# Patient Record
Sex: FEMALE | Race: WHITE | NOT HISPANIC OR LATINO | Employment: OTHER | URBAN - METROPOLITAN AREA
[De-identification: names, ages, dates, MRNs, and addresses within clinical notes are randomized per-mention and may not be internally consistent; named-entity substitution may affect disease eponyms.]

---

## 2017-01-03 ENCOUNTER — TELEPHONE (OUTPATIENT)
Dept: NEPHROLOGY | Age: 62
End: 2017-01-03

## 2017-01-04 ENCOUNTER — E-ADVICE (OUTPATIENT)
Dept: NEPHROLOGY | Age: 62
End: 2017-01-04

## 2017-01-06 ENCOUNTER — OFFICE VISIT (OUTPATIENT)
Dept: CARDIOLOGY | Age: 62
End: 2017-01-06
Attending: INTERNAL MEDICINE

## 2017-01-06 ENCOUNTER — OFFICE VISIT (OUTPATIENT)
Dept: INTERNAL MEDICINE | Age: 62
End: 2017-01-06

## 2017-01-06 ENCOUNTER — TELEPHONE (OUTPATIENT)
Dept: INTERNAL MEDICINE | Age: 62
End: 2017-01-06

## 2017-01-06 VITALS
HEART RATE: 88 BPM | DIASTOLIC BLOOD PRESSURE: 70 MMHG | BODY MASS INDEX: 46.26 KG/M2 | WEIGHT: 278 LBS | SYSTOLIC BLOOD PRESSURE: 120 MMHG

## 2017-01-06 DIAGNOSIS — R00.2 HEART PALPITATIONS: ICD-10-CM

## 2017-01-06 DIAGNOSIS — R00.2 HEART PALPITATIONS: Primary | ICD-10-CM

## 2017-01-06 DIAGNOSIS — G47.39 MIXED SLEEP APNEA: ICD-10-CM

## 2017-01-06 PROCEDURE — 99213 OFFICE O/P EST LOW 20 MIN: CPT | Performed by: INTERNAL MEDICINE

## 2017-01-06 PROCEDURE — 93225 XTRNL ECG REC<48 HRS REC: CPT | Performed by: INTERNAL MEDICINE

## 2017-01-06 PROCEDURE — 93227 XTRNL ECG REC<48 HR R&I: CPT | Performed by: INTERNAL MEDICINE

## 2017-01-06 RX ORDER — ATORVASTATIN CALCIUM 10 MG/1
10 TABLET, FILM COATED ORAL DAILY
Qty: 30 TABLET | Refills: 3 | Status: SHIPPED | OUTPATIENT
Start: 2017-01-06 | End: 2017-01-26 | Stop reason: SDUPTHER

## 2017-01-10 ENCOUNTER — TELEPHONE (OUTPATIENT)
Dept: INTERNAL MEDICINE | Age: 62
End: 2017-01-10

## 2017-01-10 RX ORDER — POLYETHYLENE GLYCOL 3350 17 G/17G
POWDER, FOR SOLUTION ORAL
Qty: 3162 G | Refills: 1 | Status: SHIPPED | OUTPATIENT
Start: 2017-01-10 | End: 2017-03-23 | Stop reason: SDUPTHER

## 2017-01-13 ENCOUNTER — E-ADVICE (OUTPATIENT)
Dept: INTERNAL MEDICINE | Age: 62
End: 2017-01-13

## 2017-01-13 RX ORDER — OMEPRAZOLE 40 MG/1
40 CAPSULE, DELAYED RELEASE ORAL 2 TIMES DAILY
Qty: 180 CAPSULE | Refills: 0 | Status: SHIPPED | OUTPATIENT
Start: 2017-01-13 | End: 2017-03-08 | Stop reason: SDUPTHER

## 2017-01-19 ENCOUNTER — E-ADVICE (OUTPATIENT)
Dept: INTERNAL MEDICINE | Age: 62
End: 2017-01-19

## 2017-01-19 DIAGNOSIS — Z98.84 STATUS POST GASTRIC BYPASS FOR OBESITY: Primary | ICD-10-CM

## 2017-01-19 DIAGNOSIS — N18.30 CKD (CHRONIC KIDNEY DISEASE), STAGE III (CMD): ICD-10-CM

## 2017-01-20 ENCOUNTER — TELEPHONE (OUTPATIENT)
Dept: SLEEP MEDICINE | Age: 62
End: 2017-01-20

## 2017-01-20 DIAGNOSIS — R40.0 DAYTIME SOMNOLENCE: Primary | ICD-10-CM

## 2017-01-26 ENCOUNTER — E-ADVICE (OUTPATIENT)
Dept: INTERNAL MEDICINE | Age: 62
End: 2017-01-26

## 2017-01-26 ENCOUNTER — TELEPHONE (OUTPATIENT)
Dept: INTERNAL MEDICINE | Age: 62
End: 2017-01-26

## 2017-01-26 RX ORDER — ATORVASTATIN CALCIUM 10 MG/1
10 TABLET, FILM COATED ORAL DAILY
Qty: 90 TABLET | Refills: 0 | Status: SHIPPED | OUTPATIENT
Start: 2017-01-26 | End: 2017-03-23 | Stop reason: SDUPTHER

## 2017-01-26 RX ORDER — CLONAZEPAM 2 MG/1
2 TABLET ORAL NIGHTLY PRN
Qty: 90 TABLET | Refills: 1 | Status: SHIPPED
Start: 2017-01-26 | End: 2017-04-10 | Stop reason: SDUPTHER

## 2017-01-31 ENCOUNTER — OFFICE VISIT (OUTPATIENT)
Dept: EDUCATION SERVICES | Age: 62
End: 2017-01-31
Attending: INTERNAL MEDICINE

## 2017-01-31 VITALS — HEIGHT: 65 IN | WEIGHT: 281.4 LBS | BODY MASS INDEX: 46.88 KG/M2

## 2017-01-31 DIAGNOSIS — N18.30 CKD (CHRONIC KIDNEY DISEASE), STAGE III (CMD): ICD-10-CM

## 2017-01-31 DIAGNOSIS — Z98.84 S/P GASTRIC BYPASS: Primary | ICD-10-CM

## 2017-01-31 PROCEDURE — 97802 MEDICAL NUTRITION INDIV IN: CPT | Performed by: DIETITIAN, REGISTERED

## 2017-01-31 RX ORDER — ONDANSETRON 4 MG/1
4 TABLET, ORALLY DISINTEGRATING ORAL EVERY 8 HOURS PRN
Qty: 20 TABLET | Refills: 1 | Status: SHIPPED | OUTPATIENT
Start: 2017-01-31 | End: 2017-03-21 | Stop reason: ALTCHOICE

## 2017-02-01 ENCOUNTER — HOSPITAL ENCOUNTER (OUTPATIENT)
Dept: SLEEP MEDICINE | Age: 62
Discharge: STILL ADMITTED OR ADMINISTRATIVE | End: 2017-02-01
Attending: INTERNAL MEDICINE

## 2017-02-01 DIAGNOSIS — R40.0 DAYTIME SOMNOLENCE: ICD-10-CM

## 2017-02-01 PROCEDURE — G0399 HOME SLEEP TEST/TYPE 3 PORTA: HCPCS

## 2017-02-02 ENCOUNTER — PREP FOR CASE (OUTPATIENT)
Dept: SURGERY | Age: 62
End: 2017-02-02

## 2017-02-02 ENCOUNTER — HOSPITAL ENCOUNTER (OUTPATIENT)
Age: 62
End: 2017-02-02
Attending: SURGERY | Admitting: SURGERY

## 2017-02-02 DIAGNOSIS — Z86.010 PERSONAL HISTORY OF COLONIC POLYPS: Primary | ICD-10-CM

## 2017-02-02 RX ORDER — POLYETHYLENE GLYCOL 3350, SODIUM CHLORIDE, SODIUM BICARBONATE, POTASSIUM CHLORIDE 420; 11.2; 5.72; 1.48 G/4L; G/4L; G/4L; G/4L
POWDER, FOR SOLUTION ORAL
Qty: 4000 ML | Refills: 0 | Status: SHIPPED | OUTPATIENT
Start: 2017-02-02 | End: 2017-09-20 | Stop reason: ALTCHOICE

## 2017-02-02 ASSESSMENT — SLEEP AND FATIGUE QUESTIONNAIRES
HOW MUCH CAFFEINE DID YOU HAVE: 20 OZ
DO YOU HAVE ANY PHYSICAL COMPLAINTS RIGHT NOW: NO
DID YOU TAKE A NAP TODAY: NO
WHEN DID YOU FEEL SLEEPY: BETWEEN 8 AND 9
DO YOU HAVE A SOUR/BITTER TASTE IN YOUR MOUTH: YES
HOW LONG DO YOU THINK YOU WERE ASLEEP (HRS/MIN): 9 HOURS
NUMBER OF TIMES YOU WOKE UP IN THE NIGHT: 2
HOW MUCH SLEEP DID YOU HAVE LAST NIGHT (HRS/MIN): 9 HOURS
DID YOU HAVE DIFFICULTY FALLING BACK TO SLEEP: YES
HAVE YOU RECENTLY TAKEN ANY MEDICATIONS THAT MAKE YOU FEEL SLEEPY: YES
DID YOU WAKE UP DURING THE NIGHT: YES
IS YOUR NOSE OR MOUTH DRY: YES
HOW DOES YOUR SLEEP LAST NIGHT COMPARE TO YOUR USUAL SLEEP AT HOME: SAME AS
DID YOU FEEL SLEEPY TODAY: YES
HAS TODAY BEEN AN UNUSUAL DAY IN ANY RESPECT: NO
HOW SLEEPY DO YOU FEEL RIGHT NOW: QUITE A BIT
HOW DOES THIS COMPARE TO YOUR USUAL AMOUNT OF SLEEP AT HOME: SAME AS
DID YOU HAVE ANY CAFFEINE TODAY: YES
WHAT TIME DID YOU WAKE UP TODAY: 23400
HOW LONG DID IT TAKE TO FALL ASLEEP LAST NIGHT (HRS/MIN): .5
DID YOU HAVE DIFFICULTY FALLING ASLEEP LAST NIGHT: YES

## 2017-02-03 ENCOUNTER — E-ADVICE (OUTPATIENT)
Dept: INTERNAL MEDICINE | Age: 62
End: 2017-02-03

## 2017-02-10 ENCOUNTER — E-ADVICE (OUTPATIENT)
Dept: NEPHROLOGY | Age: 62
End: 2017-02-10

## 2017-02-10 ENCOUNTER — PREP FOR CASE (OUTPATIENT)
Dept: SURGERY | Age: 62
End: 2017-02-10

## 2017-02-10 DIAGNOSIS — Z86.010 PERSONAL HISTORY OF COLONIC POLYPS: Primary | ICD-10-CM

## 2017-02-14 ENCOUNTER — TELEPHONE (OUTPATIENT)
Dept: INTERNAL MEDICINE | Age: 62
End: 2017-02-14

## 2017-02-24 ENCOUNTER — PREP FOR CASE (OUTPATIENT)
Dept: SURGERY | Age: 62
End: 2017-02-24

## 2017-02-24 ENCOUNTER — TELEPHONE (OUTPATIENT)
Dept: SURGERY | Age: 62
End: 2017-02-24

## 2017-02-24 DIAGNOSIS — Z86.010 HISTORY OF COLON POLYPS: Primary | ICD-10-CM

## 2017-03-08 RX ORDER — OMEPRAZOLE 40 MG/1
CAPSULE, DELAYED RELEASE ORAL
Qty: 180 CAPSULE | Refills: 0 | Status: SHIPPED | OUTPATIENT
Start: 2017-03-08 | End: 2017-03-22 | Stop reason: SDUPTHER

## 2017-03-10 RX ORDER — SODIUM CHLORIDE, SODIUM LACTATE, POTASSIUM CHLORIDE, CALCIUM CHLORIDE 600; 310; 30; 20 MG/100ML; MG/100ML; MG/100ML; MG/100ML
INJECTION, SOLUTION INTRAVENOUS CONTINUOUS
Status: CANCELLED | OUTPATIENT
Start: 2017-03-10

## 2017-03-10 RX ORDER — 0.9 % SODIUM CHLORIDE 0.9 %
2 VIAL (ML) INJECTION PRN
Status: CANCELLED | OUTPATIENT
Start: 2017-03-10

## 2017-03-10 RX ORDER — 0.9 % SODIUM CHLORIDE 0.9 %
2 VIAL (ML) INJECTION EVERY 12 HOURS SCHEDULED
Status: CANCELLED | OUTPATIENT
Start: 2017-03-10

## 2017-03-13 ENCOUNTER — TELEPHONE (OUTPATIENT)
Dept: INTERNAL MEDICINE | Age: 62
End: 2017-03-13

## 2017-03-13 RX ORDER — CLARITHROMYCIN 500 MG/1
500 TABLET, COATED ORAL 2 TIMES DAILY
Qty: 20 TABLET | Refills: 0 | Status: SHIPPED | OUTPATIENT
Start: 2017-03-13 | End: 2017-03-21 | Stop reason: ALTCHOICE

## 2017-03-21 PROCEDURE — 10004348 HB COUNTER-EVAL PRE-OP

## 2017-03-21 RX ORDER — LISINOPRIL 5 MG/1
5 TABLET ORAL DAILY
COMMUNITY
End: 2017-03-23 | Stop reason: SDUPTHER

## 2017-03-21 ASSESSMENT — ACTIVITIES OF DAILY LIVING (ADL)
HISTORY OF FALLING IN THE LAST YEAR (PRIOR TO ADMISSION): NO
CHRONIC_PAIN_PRESENT: NO
RECENT_DECLINE_ADL: NO
ADL_SCORE: 12
NEEDS_ASSIST: NO
ADL_SHORT_OF_BREATH: NO
SENSORY_SUPPORT_DEVICES: EYEGLASSES
ADL_BEFORE_ADMISSION: INDEPENDENT

## 2017-03-21 ASSESSMENT — COGNITIVE AND FUNCTIONAL STATUS - GENERAL
ARE YOU BLIND OR DO YOU HAVE SERIOUS DIFFICULTY SEEING, EVEN WHEN WEARING GLASSES: NO
ARE YOU DEAF OR DO YOU HAVE SERIOUS DIFFICULTY  HEARING: NO

## 2017-03-22 ENCOUNTER — OFFICE VISIT (OUTPATIENT)
Dept: INTERNAL MEDICINE | Age: 62
End: 2017-03-22

## 2017-03-22 VITALS — SYSTOLIC BLOOD PRESSURE: 144 MMHG | HEART RATE: 102 BPM | DIASTOLIC BLOOD PRESSURE: 90 MMHG | TEMPERATURE: 97.7 F

## 2017-03-22 DIAGNOSIS — J45.909 INTRINSIC ASTHMA, UNSPECIFIED ASTHMA SEVERITY, UNCOMPLICATED: ICD-10-CM

## 2017-03-22 DIAGNOSIS — J98.01 BRONCHOSPASM, ACUTE: Primary | ICD-10-CM

## 2017-03-22 PROCEDURE — 99213 OFFICE O/P EST LOW 20 MIN: CPT | Performed by: INTERNAL MEDICINE

## 2017-03-22 RX ORDER — CLARITHROMYCIN 500 MG/1
500 TABLET, COATED ORAL 2 TIMES DAILY
Qty: 20 TABLET | Refills: 0 | Status: SHIPPED | OUTPATIENT
Start: 2017-03-22 | End: 2017-04-01

## 2017-03-22 RX ORDER — AZITHROMYCIN 500 MG/1
500 TABLET, FILM COATED ORAL PRN
Qty: 10 TABLET | Refills: 0 | Status: ON HOLD | OUTPATIENT
Start: 2017-03-22 | End: 2018-02-01 | Stop reason: HOSPADM

## 2017-03-22 RX ORDER — PREDNISONE 20 MG/1
TABLET ORAL
Qty: 15 TABLET | Refills: 0 | Status: SHIPPED | OUTPATIENT
Start: 2017-03-22 | End: 2017-03-31 | Stop reason: SDUPTHER

## 2017-03-23 RX ORDER — POLYETHYLENE GLYCOL 3350 17 G/17G
POWDER, FOR SOLUTION ORAL
Qty: 3162 G | Refills: 3 | Status: SHIPPED | OUTPATIENT
Start: 2017-03-23 | End: 2018-04-29 | Stop reason: SDUPTHER

## 2017-03-23 RX ORDER — PRAZOSIN HYDROCHLORIDE 1 MG/1
2 CAPSULE ORAL NIGHTLY
Qty: 180 CAPSULE | Refills: 3 | Status: SHIPPED | OUTPATIENT
Start: 2017-03-23 | End: 2017-04-26 | Stop reason: DRUGHIGH

## 2017-03-23 RX ORDER — ATORVASTATIN CALCIUM 10 MG/1
10 TABLET, FILM COATED ORAL DAILY
Qty: 90 TABLET | Refills: 3 | Status: SHIPPED | OUTPATIENT
Start: 2017-03-23 | End: 2017-10-31 | Stop reason: SDUPTHER

## 2017-03-23 RX ORDER — PAROXETINE 30 MG/1
30 TABLET, FILM COATED ORAL 2 TIMES DAILY
Qty: 180 TABLET | Refills: 3 | Status: SHIPPED | OUTPATIENT
Start: 2017-03-23 | End: 2017-06-14 | Stop reason: SDUPTHER

## 2017-03-23 RX ORDER — FUROSEMIDE 40 MG/1
40 TABLET ORAL DAILY
Qty: 90 TABLET | Refills: 3 | Status: SHIPPED | OUTPATIENT
Start: 2017-03-23 | End: 2017-04-11 | Stop reason: SDUPTHER

## 2017-03-23 RX ORDER — GABAPENTIN 300 MG/1
300 CAPSULE ORAL NIGHTLY
Qty: 90 CAPSULE | Refills: 3 | Status: SHIPPED | OUTPATIENT
Start: 2017-03-23 | End: 2018-01-16 | Stop reason: SDUPTHER

## 2017-03-23 RX ORDER — OMEPRAZOLE 40 MG/1
40 CAPSULE, DELAYED RELEASE ORAL 2 TIMES DAILY
Qty: 180 CAPSULE | Refills: 3 | Status: SHIPPED | OUTPATIENT
Start: 2017-03-23 | End: 2018-02-10 | Stop reason: SDUPTHER

## 2017-03-23 RX ORDER — LISINOPRIL 5 MG/1
5 TABLET ORAL DAILY
Qty: 90 TABLET | Refills: 3 | Status: SHIPPED | OUTPATIENT
Start: 2017-03-23 | End: 2017-04-10 | Stop reason: ALTCHOICE

## 2017-03-27 ENCOUNTER — TELEPHONE (OUTPATIENT)
Dept: INTERNAL MEDICINE | Age: 62
End: 2017-03-27

## 2017-03-27 RX ORDER — HYDRALAZINE HYDROCHLORIDE 25 MG/1
25 TABLET, FILM COATED ORAL 2 TIMES DAILY
Status: SHIPPED | COMMUNITY
Start: 2017-03-27 | End: 2017-04-11 | Stop reason: ALTCHOICE

## 2017-03-27 RX ORDER — FLUCONAZOLE 100 MG/1
100 TABLET ORAL 2 TIMES DAILY
Qty: 14 TABLET | Refills: 0 | Status: SHIPPED | OUTPATIENT
Start: 2017-03-27 | End: 2017-05-09 | Stop reason: SDUPTHER

## 2017-03-29 ENCOUNTER — MED INFO FORMS (OUTPATIENT)
Dept: HEALTH INFORMATION MANAGEMENT | Age: 62
End: 2017-03-29

## 2017-03-31 ENCOUNTER — OFFICE VISIT (OUTPATIENT)
Dept: INTERNAL MEDICINE | Age: 62
End: 2017-03-31

## 2017-03-31 ENCOUNTER — TELEPHONE (OUTPATIENT)
Dept: INTERNAL MEDICINE | Age: 62
End: 2017-03-31

## 2017-03-31 VITALS
SYSTOLIC BLOOD PRESSURE: 120 MMHG | HEART RATE: 96 BPM | DIASTOLIC BLOOD PRESSURE: 74 MMHG | TEMPERATURE: 98.8 F | WEIGHT: 270 LBS | BODY MASS INDEX: 44.93 KG/M2

## 2017-03-31 DIAGNOSIS — J45.41 MODERATE PERSISTENT ASTHMA WITH ACUTE EXACERBATION: Primary | ICD-10-CM

## 2017-03-31 PROCEDURE — 99213 OFFICE O/P EST LOW 20 MIN: CPT | Performed by: INTERNAL MEDICINE

## 2017-03-31 RX ORDER — PREDNISONE 10 MG/1
TABLET ORAL
Qty: 25 TABLET | Refills: 0 | Status: SHIPPED | OUTPATIENT
Start: 2017-03-31 | End: 2017-05-04 | Stop reason: ALTCHOICE

## 2017-03-31 RX ORDER — PREDNISONE 20 MG/1
TABLET ORAL
Qty: 15 TABLET | Refills: 0 | Status: SHIPPED | COMMUNITY
Start: 2017-03-31 | End: 2017-04-11 | Stop reason: ALTCHOICE

## 2017-04-10 ENCOUNTER — OFFICE VISIT (OUTPATIENT)
Dept: INTERNAL MEDICINE | Age: 62
End: 2017-04-10

## 2017-04-10 ENCOUNTER — LAB SERVICES (OUTPATIENT)
Dept: LAB | Age: 62
End: 2017-04-10

## 2017-04-10 VITALS
HEART RATE: 88 BPM | DIASTOLIC BLOOD PRESSURE: 80 MMHG | SYSTOLIC BLOOD PRESSURE: 126 MMHG | WEIGHT: 274.6 LBS | OXYGEN SATURATION: 95 % | BODY MASS INDEX: 45.7 KG/M2

## 2017-04-10 DIAGNOSIS — R25.2 CRAMP OF BOTH LOWER EXTREMITIES: ICD-10-CM

## 2017-04-10 DIAGNOSIS — J45.909 INTRINSIC ASTHMA: Primary | ICD-10-CM

## 2017-04-10 DIAGNOSIS — N18.30 CKD (CHRONIC KIDNEY DISEASE), STAGE III (CMD): ICD-10-CM

## 2017-04-10 DIAGNOSIS — I10 ESSENTIAL HYPERTENSION WITH GOAL BLOOD PRESSURE LESS THAN 140/90: ICD-10-CM

## 2017-04-10 DIAGNOSIS — E11.9 TYPE 2 DIABETES MELLITUS WITHOUT COMPLICATION, WITHOUT LONG-TERM CURRENT USE OF INSULIN (CMD): ICD-10-CM

## 2017-04-10 LAB
ALBUMIN SERPL-MCNC: 4 G/DL (ref 3.6–5.1)
ALBUMIN/GLOB SERPL: 1.7 {RATIO} (ref 1–2.4)
ALP SERPL-CCNC: 71 UNITS/L (ref 45–117)
ALT SERPL-CCNC: 67 UNITS/L
ANION GAP SERPL CALC-SCNC: 13 MMOL/L (ref 10–20)
AST SERPL-CCNC: 62 UNITS/L
BASOPHILS # BLD AUTO: 0 K/MCL (ref 0–0.3)
BASOPHILS NFR BLD AUTO: 0 %
BILIRUB SERPL-MCNC: 0.5 MG/DL (ref 0.2–1)
BUN SERPL-MCNC: 27 MG/DL (ref 6–20)
BUN/CREAT SERPL: 14 (ref 7–25)
CALCIUM SERPL-MCNC: 8.1 MG/DL (ref 8.4–10.2)
CHLORIDE SERPL-SCNC: 88 MMOL/L (ref 98–107)
CO2 SERPL-SCNC: 28 MMOL/L (ref 21–32)
CREAT SERPL-MCNC: 1.9 MG/DL (ref 0.51–0.95)
DIFFERENTIAL METHOD BLD: ABNORMAL
EOSINOPHIL # BLD AUTO: 0.1 K/MCL (ref 0.1–0.5)
EOSINOPHIL NFR SPEC: 1 %
ERYTHROCYTE [DISTWIDTH] IN BLOOD: 13.5 % (ref 11–15)
FASTING STATUS PATIENT QL REPORTED: 5 HRS
GLOBULIN SER-MCNC: 2.3 G/DL (ref 2–4)
GLUCOSE SERPL-MCNC: 167 MG/DL (ref 65–99)
HCT VFR BLD CALC: 34.9 % (ref 36–46.5)
HGB BLD-MCNC: 11.7 G/DL (ref 12–15.5)
LYMPHOCYTES # BLD MANUAL: 0.6 K/MCL (ref 1–4)
LYMPHOCYTES NFR BLD MANUAL: 8 %
MAGNESIUM SERPL-MCNC: 2.3 MG/DL (ref 1.7–2.4)
MCH RBC QN AUTO: 29 PG (ref 26–34)
MCHC RBC AUTO-ENTMCNC: 33.5 G/DL (ref 32–36.5)
MCV RBC AUTO: 86.6 FL (ref 78–100)
MONOCYTES # BLD MANUAL: 0.5 K/MCL (ref 0.3–0.9)
MONOCYTES NFR BLD MANUAL: 6 %
NEUTROPHILS # BLD AUTO: 6.8 K/MCL (ref 1.8–7.7)
NEUTROPHILS NFR BLD AUTO: 85 %
PLATELET # BLD: 219 K/MCL (ref 140–450)
POTASSIUM SERPL-SCNC: 5.1 MMOL/L (ref 3.4–5.1)
PROT SERPL-MCNC: 6.3 G/DL (ref 6.4–8.2)
RBC # BLD: 4.03 MIL/MCL (ref 4–5.2)
SODIUM SERPL-SCNC: 124 MMOL/L (ref 135–145)
WBC # BLD: 8 K/MCL (ref 4.2–11)

## 2017-04-10 PROCEDURE — 99213 OFFICE O/P EST LOW 20 MIN: CPT | Performed by: INTERNAL MEDICINE

## 2017-04-10 PROCEDURE — 80053 COMPREHEN METABOLIC PANEL: CPT | Performed by: INTERNAL MEDICINE

## 2017-04-10 PROCEDURE — 83735 ASSAY OF MAGNESIUM: CPT | Performed by: INTERNAL MEDICINE

## 2017-04-10 PROCEDURE — 85025 COMPLETE CBC W/AUTO DIFF WBC: CPT | Performed by: INTERNAL MEDICINE

## 2017-04-10 PROCEDURE — 36415 COLL VENOUS BLD VENIPUNCTURE: CPT | Performed by: INTERNAL MEDICINE

## 2017-04-10 RX ORDER — CLONAZEPAM 2 MG/1
2 TABLET ORAL NIGHTLY PRN
Qty: 90 TABLET | Refills: 3 | Status: SHIPPED | OUTPATIENT
Start: 2017-04-10 | End: 2017-10-26 | Stop reason: SDUPTHER

## 2017-04-11 ENCOUNTER — TELEPHONE (OUTPATIENT)
Dept: INTERNAL MEDICINE | Age: 62
End: 2017-04-11

## 2017-04-11 DIAGNOSIS — N18.30 CKD (CHRONIC KIDNEY DISEASE), STAGE III (CMD): Primary | ICD-10-CM

## 2017-04-11 RX ORDER — POTASSIUM CITRATE 10 MEQ/1
TABLET, EXTENDED RELEASE ORAL
Qty: 270 TABLET | Refills: 3 | Status: SHIPPED | COMMUNITY
Start: 2017-04-11 | End: 2017-04-17 | Stop reason: SDUPTHER

## 2017-04-11 RX ORDER — FUROSEMIDE 40 MG/1
20 TABLET ORAL DAILY
Qty: 90 TABLET | Refills: 3 | Status: SHIPPED | COMMUNITY
Start: 2017-04-11 | End: 2017-04-17 | Stop reason: SDUPTHER

## 2017-04-13 ENCOUNTER — MED INFO FORMS (OUTPATIENT)
Dept: HEALTH INFORMATION MANAGEMENT | Age: 62
End: 2017-04-13

## 2017-04-15 ENCOUNTER — NURSE TRIAGE (OUTPATIENT)
Dept: TELEHEALTH | Age: 62
End: 2017-04-15

## 2017-04-15 ENCOUNTER — WALK IN (OUTPATIENT)
Dept: URGENT CARE | Age: 62
End: 2017-04-15

## 2017-04-15 VITALS
DIASTOLIC BLOOD PRESSURE: 78 MMHG | SYSTOLIC BLOOD PRESSURE: 151 MMHG | RESPIRATION RATE: 22 BRPM | BODY MASS INDEX: 46.52 KG/M2 | OXYGEN SATURATION: 96 % | HEART RATE: 100 BPM | WEIGHT: 279.54 LBS | TEMPERATURE: 99.3 F

## 2017-04-15 DIAGNOSIS — J04.2 LARYNGOTRACHEITIS: Primary | ICD-10-CM

## 2017-04-15 PROCEDURE — 99214 OFFICE O/P EST MOD 30 MIN: CPT | Performed by: PEDIATRICS

## 2017-04-15 RX ORDER — PREDNISONE 20 MG/1
TABLET ORAL
Refills: 0 | Status: SHIPPED | COMMUNITY
Start: 2017-04-15 | End: 2017-04-17 | Stop reason: DRUGHIGH

## 2017-04-17 ENCOUNTER — LAB SERVICES (OUTPATIENT)
Dept: LAB | Age: 62
End: 2017-04-17

## 2017-04-17 ENCOUNTER — OFFICE VISIT (OUTPATIENT)
Dept: INTERNAL MEDICINE | Age: 62
End: 2017-04-17

## 2017-04-17 VITALS
DIASTOLIC BLOOD PRESSURE: 88 MMHG | WEIGHT: 279 LBS | SYSTOLIC BLOOD PRESSURE: 160 MMHG | TEMPERATURE: 99.8 F | HEART RATE: 96 BPM | BODY MASS INDEX: 46.43 KG/M2

## 2017-04-17 DIAGNOSIS — N18.30 CKD (CHRONIC KIDNEY DISEASE), STAGE III (CMD): ICD-10-CM

## 2017-04-17 DIAGNOSIS — J45.909 INTRINSIC ASTHMA: Primary | ICD-10-CM

## 2017-04-17 DIAGNOSIS — E11.9 TYPE 2 DIABETES MELLITUS WITHOUT COMPLICATION, WITHOUT LONG-TERM CURRENT USE OF INSULIN (CMD): ICD-10-CM

## 2017-04-17 DIAGNOSIS — N20.0 CALCULUS OF KIDNEY: Primary | ICD-10-CM

## 2017-04-17 DIAGNOSIS — E87.1 CHRONIC HYPONATREMIA: ICD-10-CM

## 2017-04-17 LAB
ANION GAP SERPL CALC-SCNC: 12 MMOL/L (ref 10–20)
BUN SERPL-MCNC: 15 MG/DL (ref 6–20)
BUN/CREAT SERPL: 10 (ref 7–25)
CALCIUM SERPL-MCNC: 8.5 MG/DL (ref 8.4–10.2)
CHLORIDE SERPL-SCNC: 101 MMOL/L (ref 98–107)
CO2 SERPL-SCNC: 29 MMOL/L (ref 21–32)
CREAT SERPL-MCNC: 1.44 MG/DL (ref 0.51–0.95)
FASTING STATUS PATIENT QL REPORTED: 7 HRS
GLUCOSE SERPL-MCNC: 141 MG/DL (ref 65–99)
POTASSIUM SERPL-SCNC: 5 MMOL/L (ref 3.4–5.1)
SODIUM SERPL-SCNC: 137 MMOL/L (ref 135–145)

## 2017-04-17 PROCEDURE — 36415 COLL VENOUS BLD VENIPUNCTURE: CPT | Performed by: INTERNAL MEDICINE

## 2017-04-17 PROCEDURE — 80048 BASIC METABOLIC PNL TOTAL CA: CPT | Performed by: INTERNAL MEDICINE

## 2017-04-17 PROCEDURE — 99213 OFFICE O/P EST LOW 20 MIN: CPT | Performed by: INTERNAL MEDICINE

## 2017-04-17 RX ORDER — PREDNISONE 10 MG/1
TABLET ORAL
Refills: 0 | Status: SHIPPED | COMMUNITY
Start: 2017-04-17 | End: 2017-04-26 | Stop reason: CLARIF

## 2017-04-17 RX ORDER — FUROSEMIDE 40 MG/1
TABLET ORAL
Qty: 90 TABLET | Refills: 3 | Status: SHIPPED | COMMUNITY
Start: 2017-04-17 | End: 2017-07-28 | Stop reason: SDUPTHER

## 2017-04-17 RX ORDER — POTASSIUM CITRATE 10 MEQ/1
TABLET, EXTENDED RELEASE ORAL
Qty: 270 TABLET | Refills: 3 | Status: SHIPPED | COMMUNITY
Start: 2017-04-17 | End: 2017-07-28 | Stop reason: SDUPTHER

## 2017-04-19 ENCOUNTER — TELEPHONE (OUTPATIENT)
Dept: INTERNAL MEDICINE | Age: 62
End: 2017-04-19

## 2017-04-20 ENCOUNTER — LAB SERVICES (OUTPATIENT)
Dept: LAB | Age: 62
End: 2017-04-20

## 2017-04-20 DIAGNOSIS — N20.0 CALCULUS OF KIDNEY: ICD-10-CM

## 2017-04-20 DIAGNOSIS — N18.30 CKD (CHRONIC KIDNEY DISEASE), STAGE III (CMD): ICD-10-CM

## 2017-04-20 LAB
COLLECT DURATION TIME UR: 24 HRS
CREAT 24H UR-MRATE: 0.66 G/24 HR (ref 0.67–1.59)
CREAT ?TM UR-MCNC: 25 MG/DL
SPECIMEN VOL UR: 2625 ML

## 2017-04-20 PROCEDURE — 83945 ASSAY OF OXALATE: CPT | Performed by: INTERNAL MEDICINE

## 2017-04-20 PROCEDURE — 81050 URINALYSIS VOLUME MEASURE: CPT | Performed by: INTERNAL MEDICINE

## 2017-04-20 PROCEDURE — 82340 ASSAY OF CALCIUM IN URINE: CPT | Performed by: INTERNAL MEDICINE

## 2017-04-20 PROCEDURE — 82570 ASSAY OF URINE CREATININE: CPT | Performed by: INTERNAL MEDICINE

## 2017-04-20 PROCEDURE — 82507 ASSAY OF CITRATE: CPT | Performed by: INTERNAL MEDICINE

## 2017-04-21 LAB
CALCIUM 24H UR-MRATE: NORMAL MG/24 HR (ref 50–300)
CALCIUM ?TM UR-MCNC: <5 MG/DL

## 2017-04-24 LAB
CITRATE 24H UR-MRATE: 274 MG/24HRS (ref 320–940)
COLLECT TME UR: 24
OXALATE 24H UR-MRATE: 18 MG/24HRS (ref 4–31)
SPECIMEN VOL 24H UR: 2300 L

## 2017-04-25 ENCOUNTER — TELEPHONE (OUTPATIENT)
Dept: INTERNAL MEDICINE | Age: 62
End: 2017-04-25

## 2017-04-25 ENCOUNTER — IMAGING SERVICES (OUTPATIENT)
Dept: GENERAL RADIOLOGY | Age: 62
End: 2017-04-25
Attending: INTERNAL MEDICINE

## 2017-04-25 ENCOUNTER — OFFICE VISIT (OUTPATIENT)
Dept: INTERNAL MEDICINE | Age: 62
End: 2017-04-25

## 2017-04-25 VITALS
BODY MASS INDEX: 45.93 KG/M2 | HEART RATE: 88 BPM | DIASTOLIC BLOOD PRESSURE: 96 MMHG | SYSTOLIC BLOOD PRESSURE: 158 MMHG | WEIGHT: 276 LBS

## 2017-04-25 DIAGNOSIS — J01.01 RECURRENT MAXILLARY SINUSITIS: ICD-10-CM

## 2017-04-25 DIAGNOSIS — M79.662 PAIN OF LEFT LOWER LEG: ICD-10-CM

## 2017-04-25 DIAGNOSIS — M79.662 PAIN OF LEFT LOWER LEG: Primary | ICD-10-CM

## 2017-04-25 PROCEDURE — 93971 EXTREMITY STUDY: CPT | Performed by: RADIOLOGY

## 2017-04-25 PROCEDURE — 99213 OFFICE O/P EST LOW 20 MIN: CPT | Performed by: INTERNAL MEDICINE

## 2017-04-25 RX ORDER — LEVOFLOXACIN 500 MG/1
500 TABLET, FILM COATED ORAL DAILY
Qty: 10 TABLET | Refills: 0 | Status: SHIPPED | OUTPATIENT
Start: 2017-04-25 | End: 2017-05-05

## 2017-04-26 ENCOUNTER — TELEPHONE (OUTPATIENT)
Dept: INTERNAL MEDICINE | Age: 62
End: 2017-04-26

## 2017-04-26 ENCOUNTER — OFFICE VISIT (OUTPATIENT)
Dept: NEPHROLOGY | Age: 62
End: 2017-04-26

## 2017-04-26 VITALS
DIASTOLIC BLOOD PRESSURE: 92 MMHG | BODY MASS INDEX: 45.93 KG/M2 | HEART RATE: 76 BPM | SYSTOLIC BLOOD PRESSURE: 186 MMHG | WEIGHT: 276 LBS

## 2017-04-26 DIAGNOSIS — N18.30 CKD (CHRONIC KIDNEY DISEASE), STAGE III (CMD): Primary | ICD-10-CM

## 2017-04-26 DIAGNOSIS — I10 ESSENTIAL HYPERTENSION WITH GOAL BLOOD PRESSURE LESS THAN 140/90: ICD-10-CM

## 2017-04-26 DIAGNOSIS — D50.0 IRON DEFICIENCY ANEMIA DUE TO CHRONIC BLOOD LOSS: ICD-10-CM

## 2017-04-26 DIAGNOSIS — R25.2 CRAMP OF BOTH LOWER EXTREMITIES: ICD-10-CM

## 2017-04-26 DIAGNOSIS — D63.1 ANEMIA OF CHRONIC RENAL FAILURE, STAGE 3 (MODERATE) (CMD): ICD-10-CM

## 2017-04-26 DIAGNOSIS — N18.30 ANEMIA OF CHRONIC RENAL FAILURE, STAGE 3 (MODERATE) (CMD): ICD-10-CM

## 2017-04-26 DIAGNOSIS — N20.0 NEPHROLITHIASIS: ICD-10-CM

## 2017-04-26 PROCEDURE — 99214 OFFICE O/P EST MOD 30 MIN: CPT | Performed by: INTERNAL MEDICINE

## 2017-04-26 RX ORDER — PRAZOSIN HYDROCHLORIDE 2 MG/1
2 CAPSULE ORAL 3 TIMES DAILY
Qty: 270 CAPSULE | Refills: 2 | Status: SHIPPED | OUTPATIENT
Start: 2017-04-26 | End: 2017-04-26 | Stop reason: SDUPTHER

## 2017-04-26 RX ORDER — PRAZOSIN HYDROCHLORIDE 1 MG/1
2 CAPSULE ORAL 3 TIMES DAILY
Qty: 270 CAPSULE | Refills: 3 | Status: SHIPPED | OUTPATIENT
Start: 2017-04-26 | End: 2017-04-26 | Stop reason: DRUGHIGH

## 2017-04-26 RX ORDER — PRAZOSIN HYDROCHLORIDE 2 MG/1
2 CAPSULE ORAL 3 TIMES DAILY
Qty: 270 CAPSULE | Refills: 2 | Status: SHIPPED | OUTPATIENT
Start: 2017-04-26 | End: 2018-04-26 | Stop reason: DRUGHIGH

## 2017-04-26 RX ORDER — CODEINE PHOSPHATE AND GUAIFENESIN 10; 100 MG/5ML; MG/5ML
5 SOLUTION ORAL 3 TIMES DAILY PRN
Qty: 240 ML | Refills: 0 | Status: SHIPPED | OUTPATIENT
Start: 2017-04-26 | End: 2017-09-07 | Stop reason: SDUPTHER

## 2017-04-26 RX ORDER — PREDNISONE 10 MG/1
TABLET ORAL
Refills: 0 | Status: SHIPPED | COMMUNITY
Start: 2017-04-26 | End: 2017-07-30 | Stop reason: SDUPTHER

## 2017-04-27 ENCOUNTER — E-ADVICE (OUTPATIENT)
Dept: INTERNAL MEDICINE | Age: 62
End: 2017-04-27

## 2017-04-27 ENCOUNTER — TELEPHONE (OUTPATIENT)
Dept: INTERNAL MEDICINE | Age: 62
End: 2017-04-27

## 2017-04-27 DIAGNOSIS — D80.1 HYPOGAMMAGLOBULINEMIA (CMD): Primary | ICD-10-CM

## 2017-04-27 PROCEDURE — 96523 IRRIG DRUG DELIVERY DEVICE: CPT | Performed by: INTERNAL MEDICINE

## 2017-05-01 ENCOUNTER — E-ADVICE (OUTPATIENT)
Dept: INTERNAL MEDICINE | Age: 62
End: 2017-05-01

## 2017-05-04 ENCOUNTER — OFFICE VISIT (OUTPATIENT)
Dept: INTERNAL MEDICINE | Age: 62
End: 2017-05-04

## 2017-05-04 VITALS
DIASTOLIC BLOOD PRESSURE: 90 MMHG | BODY MASS INDEX: 45.6 KG/M2 | SYSTOLIC BLOOD PRESSURE: 148 MMHG | WEIGHT: 274 LBS | HEART RATE: 92 BPM

## 2017-05-04 DIAGNOSIS — I10 ESSENTIAL HYPERTENSION WITH GOAL BLOOD PRESSURE LESS THAN 140/90: ICD-10-CM

## 2017-05-04 DIAGNOSIS — E11.9 TYPE 2 DIABETES MELLITUS WITHOUT COMPLICATION, WITHOUT LONG-TERM CURRENT USE OF INSULIN (CMD): ICD-10-CM

## 2017-05-04 DIAGNOSIS — E03.8 OTHER SPECIFIED HYPOTHYROIDISM: ICD-10-CM

## 2017-05-04 DIAGNOSIS — G43.009 MIGRAINE WITHOUT AURA AND WITHOUT STATUS MIGRAINOSUS, NOT INTRACTABLE: ICD-10-CM

## 2017-05-04 DIAGNOSIS — N18.30 CKD (CHRONIC KIDNEY DISEASE), STAGE III (CMD): ICD-10-CM

## 2017-05-04 DIAGNOSIS — D80.1 HYPOGAMMAGLOBULINEMIA (CMD): ICD-10-CM

## 2017-05-04 DIAGNOSIS — J45.909 INTRINSIC ASTHMA: Primary | ICD-10-CM

## 2017-05-04 DIAGNOSIS — F43.10 PTSD (POST-TRAUMATIC STRESS DISORDER): ICD-10-CM

## 2017-05-04 PROCEDURE — 99214 OFFICE O/P EST MOD 30 MIN: CPT | Performed by: INTERNAL MEDICINE

## 2017-05-04 RX ORDER — DOXYCYCLINE HYCLATE 100 MG/1
100 CAPSULE ORAL 2 TIMES DAILY
Qty: 20 CAPSULE | Refills: 0 | Status: SHIPPED | OUTPATIENT
Start: 2017-05-04 | End: 2017-05-31 | Stop reason: SDUPTHER

## 2017-05-05 ENCOUNTER — TELEPHONE (OUTPATIENT)
Dept: INTERNAL MEDICINE | Age: 62
End: 2017-05-05

## 2017-05-05 DIAGNOSIS — M12.9 ARTHRITIS, MULTIPLE JOINT INVOLVEMENT: Primary | ICD-10-CM

## 2017-05-08 ENCOUNTER — E-ADVICE (OUTPATIENT)
Dept: NEPHROLOGY | Age: 62
End: 2017-05-08

## 2017-05-09 ENCOUNTER — E-ADVICE (OUTPATIENT)
Dept: INTERNAL MEDICINE | Age: 62
End: 2017-05-09

## 2017-05-09 RX ORDER — FLUCONAZOLE 100 MG/1
100 TABLET ORAL 2 TIMES DAILY
Qty: 14 TABLET | Refills: 0 | Status: SHIPPED | OUTPATIENT
Start: 2017-05-09 | End: 2017-05-16

## 2017-05-11 ENCOUNTER — HOSPITAL ENCOUNTER (OUTPATIENT)
Dept: INFUSION THERAPY | Age: 62
Discharge: STILL ADMITTED OR ADMINISTRATIVE | End: 2017-05-11
Attending: INTERNAL MEDICINE

## 2017-05-11 VITALS
SYSTOLIC BLOOD PRESSURE: 134 MMHG | HEART RATE: 85 BPM | OXYGEN SATURATION: 95 % | RESPIRATION RATE: 16 BRPM | DIASTOLIC BLOOD PRESSURE: 63 MMHG

## 2017-05-11 PROCEDURE — 36592 COLLECT BLOOD FROM PICC: CPT

## 2017-05-11 PROCEDURE — 82784 ASSAY IGA/IGD/IGG/IGM EACH: CPT

## 2017-05-12 ENCOUNTER — TELEPHONE (OUTPATIENT)
Dept: NEPHROLOGY | Age: 62
End: 2017-05-12

## 2017-05-12 LAB
IGA SERPL-MCNC: 120 MG/DL (ref 82–453)
IGG SERPL-MCNC: 516 MG/DL (ref 751–1560)
IGM SERPL-MCNC: 25 MG/DL (ref 46–304)

## 2017-05-12 RX ORDER — AMLODIPINE BESYLATE 5 MG/1
5 TABLET ORAL DAILY
Qty: 30 TABLET | Refills: 3 | Status: SHIPPED | OUTPATIENT
Start: 2017-05-12 | End: 2017-05-31 | Stop reason: SDUPTHER

## 2017-05-27 ENCOUNTER — APPOINTMENT (OUTPATIENT)
Dept: GENERAL RADIOLOGY | Age: 62
End: 2017-05-27

## 2017-05-27 ENCOUNTER — HOSPITAL ENCOUNTER (EMERGENCY)
Age: 62
Discharge: HOME | End: 2017-05-27

## 2017-05-27 ENCOUNTER — OFF PREMISE CHARGES (OUTPATIENT)
Dept: CARDIOLOGY | Age: 62
End: 2017-05-27

## 2017-05-27 VITALS
WEIGHT: 275 LBS | TEMPERATURE: 98.2 F | HEIGHT: 65 IN | BODY MASS INDEX: 45.82 KG/M2 | HEART RATE: 91 BPM | SYSTOLIC BLOOD PRESSURE: 131 MMHG | DIASTOLIC BLOOD PRESSURE: 60 MMHG | OXYGEN SATURATION: 95 % | RESPIRATION RATE: 16 BRPM

## 2017-05-27 DIAGNOSIS — L03.116 BILATERAL CELLULITIS OF LOWER LEG: Primary | ICD-10-CM

## 2017-05-27 DIAGNOSIS — R60.0 BILATERAL LOWER EXTREMITY EDEMA: ICD-10-CM

## 2017-05-27 DIAGNOSIS — L03.115 BILATERAL CELLULITIS OF LOWER LEG: Primary | ICD-10-CM

## 2017-05-27 DIAGNOSIS — R60.0 BILATERAL LEG EDEMA: ICD-10-CM

## 2017-05-27 LAB
ALBUMIN SERPL-MCNC: 3.8 G/DL (ref 3.6–5.1)
ALBUMIN/GLOB SERPL: 1.4 {RATIO} (ref 1–2.4)
ALP SERPL-CCNC: 81 UNITS/L (ref 45–117)
ALT SERPL-CCNC: 40 UNITS/L
ANION GAP SERPL CALC-SCNC: 13 MMOL/L (ref 10–20)
AST SERPL-CCNC: 34 UNITS/L
BASOPHILS # BLD AUTO: 0 K/MCL (ref 0–0.3)
BASOPHILS NFR BLD AUTO: 1 %
BILIRUB SERPL-MCNC: 0.4 MG/DL (ref 0.2–1)
BNP SERPL-MCNC: 27 PG/ML
BUN SERPL-MCNC: 19 MG/DL (ref 6–20)
BUN/CREAT SERPL: 10 (ref 7–25)
CALCIUM SERPL-MCNC: 8.6 MG/DL (ref 8.4–10.2)
CHLORIDE SERPL-SCNC: 98 MMOL/L (ref 98–107)
CO2 SERPL-SCNC: 26 MMOL/L (ref 21–32)
CREAT SERPL-MCNC: 1.84 MG/DL (ref 0.51–0.95)
CROSSMATCH EXPIRE: NORMAL
DIFFERENTIAL METHOD BLD: ABNORMAL
EOSINOPHIL # BLD AUTO: 0.4 K/MCL (ref 0.1–0.5)
EOSINOPHIL NFR SPEC: 6 %
ERYTHROCYTE [DISTWIDTH] IN BLOOD: 13.6 % (ref 11–15)
GLOBULIN SER-MCNC: 2.8 G/DL (ref 2–4)
GLUCOSE SERPL-MCNC: 120 MG/DL (ref 65–99)
HCT VFR BLD CALC: 32.8 % (ref 36–46.5)
HGB BLD-MCNC: 11 G/DL (ref 12–15.5)
LYMPHOCYTES # BLD MANUAL: 1.5 K/MCL (ref 1–4)
LYMPHOCYTES NFR BLD MANUAL: 26 %
MCH RBC QN AUTO: 30.7 PG (ref 26–34)
MCHC RBC AUTO-ENTMCNC: 33.5 G/DL (ref 32–36.5)
MCV RBC AUTO: 91.6 FL (ref 78–100)
MONOCYTES # BLD MANUAL: 0.4 K/MCL (ref 0.3–0.9)
MONOCYTES NFR BLD MANUAL: 8 %
NEUTROPHILS # BLD AUTO: 3.5 K/MCL (ref 1.8–7.7)
NEUTROPHILS NFR BLD AUTO: 59 %
PLATELET # BLD: 201 K/MCL (ref 140–450)
POTASSIUM SERPL-SCNC: 4 MMOL/L (ref 3.4–5.1)
PROT SERPL-MCNC: 6.6 G/DL (ref 6.4–8.2)
RBC # BLD: 3.58 MIL/MCL (ref 4–5.2)
SODIUM SERPL-SCNC: 133 MMOL/L (ref 135–145)
WBC # BLD: 5.9 K/MCL (ref 4.2–11)

## 2017-05-27 PROCEDURE — 93005 ELECTROCARDIOGRAM TRACING: CPT | Performed by: PHYSICIAN ASSISTANT

## 2017-05-27 PROCEDURE — 71010 XR CHEST AP OR PA: CPT | Performed by: RADIOLOGY

## 2017-05-27 PROCEDURE — 99285 EMERGENCY DEPT VISIT HI MDM: CPT

## 2017-05-27 PROCEDURE — 83880 ASSAY OF NATRIURETIC PEPTIDE: CPT

## 2017-05-27 PROCEDURE — 10002800 HB RX 250 W HCPCS: Performed by: PHYSICIAN ASSISTANT

## 2017-05-27 PROCEDURE — 80053 COMPREHEN METABOLIC PANEL: CPT

## 2017-05-27 PROCEDURE — 85025 COMPLETE CBC W/AUTO DIFF WBC: CPT

## 2017-05-27 PROCEDURE — 87040 BLOOD CULTURE FOR BACTERIA: CPT

## 2017-05-27 PROCEDURE — 93010 ELECTROCARDIOGRAM REPORT: CPT | Performed by: INTERNAL MEDICINE

## 2017-05-27 PROCEDURE — 71010 XR CHEST AP OR PA: CPT

## 2017-05-27 PROCEDURE — 87076 CULTURE ANAEROBE IDENT EACH: CPT

## 2017-05-27 PROCEDURE — 99284 EMERGENCY DEPT VISIT MOD MDM: CPT | Performed by: PHYSICIAN ASSISTANT

## 2017-05-27 RX ORDER — DOXYCYCLINE HYCLATE 100 MG
100 TABLET ORAL 2 TIMES DAILY
Qty: 14 TABLET | Refills: 0 | Status: SHIPPED | OUTPATIENT
Start: 2017-05-27 | End: 2017-06-03

## 2017-05-27 RX ADMIN — SODIUM CHLORIDE, PRESERVATIVE FREE 500 UNITS: 5 INJECTION INTRAVENOUS at 21:21

## 2017-05-27 ASSESSMENT — PAIN SCALES - GENERAL
PAINLEVEL_OUTOF10: 3
PAINLEVEL_OUTOF10: 3

## 2017-05-27 ASSESSMENT — MOVEMENT AND STRENGTH ASSESSMENTS: FACE_JAW: NO FACIAL DROOP

## 2017-05-28 LAB
ATRIAL RATE (BPM): 88
P AXIS (DEGREES): 43
PR-INTERVAL (MSEC): 194
QRS-INTERVAL (MSEC): 90
QT-INTERVAL (MSEC): 338
QTC: 408
R AXIS (DEGREES): 10
REPORT TEXT: NORMAL
T AXIS (DEGREES): 23
VENTRICULAR RATE EKG/MIN (BPM): 88

## 2017-05-30 ENCOUNTER — E-ADVICE (OUTPATIENT)
Dept: INTERNAL MEDICINE | Age: 62
End: 2017-05-30

## 2017-05-30 ENCOUNTER — E-ADVICE (OUTPATIENT)
Dept: NEPHROLOGY | Age: 62
End: 2017-05-30

## 2017-05-31 ENCOUNTER — OFFICE VISIT (OUTPATIENT)
Dept: INTERNAL MEDICINE | Age: 62
End: 2017-05-31

## 2017-05-31 VITALS
SYSTOLIC BLOOD PRESSURE: 148 MMHG | DIASTOLIC BLOOD PRESSURE: 84 MMHG | BODY MASS INDEX: 46.1 KG/M2 | HEART RATE: 84 BPM | TEMPERATURE: 99.2 F | WEIGHT: 277 LBS

## 2017-05-31 DIAGNOSIS — N18.30 CKD (CHRONIC KIDNEY DISEASE), STAGE III (CMD): ICD-10-CM

## 2017-05-31 DIAGNOSIS — J45.909 INTRINSIC ASTHMA: ICD-10-CM

## 2017-05-31 DIAGNOSIS — D80.1 HYPOGAMMAGLOBULINEMIA (CMD): ICD-10-CM

## 2017-05-31 DIAGNOSIS — I10 ESSENTIAL HYPERTENSION WITH GOAL BLOOD PRESSURE LESS THAN 140/90: ICD-10-CM

## 2017-05-31 DIAGNOSIS — L03.119 CELLULITIS OF LOWER EXTREMITY, UNSPECIFIED LATERALITY: Primary | ICD-10-CM

## 2017-05-31 DIAGNOSIS — E11.9 TYPE 2 DIABETES MELLITUS WITHOUT COMPLICATION, WITHOUT LONG-TERM CURRENT USE OF INSULIN (CMD): ICD-10-CM

## 2017-05-31 PROCEDURE — 99214 OFFICE O/P EST MOD 30 MIN: CPT | Performed by: INTERNAL MEDICINE

## 2017-05-31 RX ORDER — POTASSIUM CITRATE 10 MEQ/1
TABLET, EXTENDED RELEASE ORAL
Qty: 300 TABLET | OUTPATIENT
Start: 2017-05-31

## 2017-05-31 RX ORDER — DOXYCYCLINE HYCLATE 100 MG/1
100 CAPSULE ORAL 2 TIMES DAILY
Qty: 20 CAPSULE | Refills: 0 | Status: SHIPPED | OUTPATIENT
Start: 2017-05-31 | End: 2017-06-27 | Stop reason: ALTCHOICE

## 2017-05-31 RX ORDER — AMLODIPINE BESYLATE 5 MG/1
2.5 TABLET ORAL DAILY
Qty: 30 TABLET | Refills: 3 | Status: SHIPPED | COMMUNITY
Start: 2017-05-31 | End: 2017-10-16 | Stop reason: SDUPTHER

## 2017-05-31 RX ORDER — LEVOTHYROXINE SODIUM 0.05 MG/1
TABLET ORAL
Qty: 90 TABLET | Refills: 1 | Status: SHIPPED | OUTPATIENT
Start: 2017-05-31 | End: 2017-08-23 | Stop reason: SDUPTHER

## 2017-06-02 ENCOUNTER — OFFICE VISIT (OUTPATIENT)
Dept: RHEUMATOLOGY | Age: 62
End: 2017-06-02
Attending: INTERNAL MEDICINE

## 2017-06-02 ENCOUNTER — IMAGING SERVICES (OUTPATIENT)
Dept: GENERAL RADIOLOGY | Age: 62
End: 2017-06-02
Attending: INTERNAL MEDICINE

## 2017-06-02 ENCOUNTER — LAB SERVICES (OUTPATIENT)
Dept: LAB | Age: 62
End: 2017-06-02

## 2017-06-02 VITALS
WEIGHT: 276 LBS | DIASTOLIC BLOOD PRESSURE: 70 MMHG | HEIGHT: 64 IN | RESPIRATION RATE: 16 BRPM | SYSTOLIC BLOOD PRESSURE: 126 MMHG | TEMPERATURE: 97.8 F | HEART RATE: 64 BPM | BODY MASS INDEX: 47.12 KG/M2

## 2017-06-02 DIAGNOSIS — M25.50 POLYARTHRALGIA: ICD-10-CM

## 2017-06-02 DIAGNOSIS — M25.50 POLYARTHRALGIA: Primary | ICD-10-CM

## 2017-06-02 LAB
BACTERIA BLD CULT: NORMAL
ERYTHROCYTE [SEDIMENTATION RATE] IN BLOOD: 15 MM/HR (ref 0–20)
REPORT STATUS (RPT): NORMAL
SPECIMEN SOURCE: NORMAL

## 2017-06-02 PROCEDURE — 86431 RHEUMATOID FACTOR QUANT: CPT | Performed by: INTERNAL MEDICINE

## 2017-06-02 PROCEDURE — 73130 X-RAY EXAM OF HAND: CPT | Performed by: RADIOLOGY

## 2017-06-02 PROCEDURE — 73630 X-RAY EXAM OF FOOT: CPT | Performed by: RADIOLOGY

## 2017-06-02 PROCEDURE — 86140 C-REACTIVE PROTEIN: CPT | Performed by: INTERNAL MEDICINE

## 2017-06-02 PROCEDURE — 86803 HEPATITIS C AB TEST: CPT | Performed by: INTERNAL MEDICINE

## 2017-06-02 PROCEDURE — 86705 HEP B CORE ANTIBODY IGM: CPT | Performed by: INTERNAL MEDICINE

## 2017-06-02 PROCEDURE — 86200 CCP ANTIBODY: CPT | Performed by: INTERNAL MEDICINE

## 2017-06-02 PROCEDURE — 36415 COLL VENOUS BLD VENIPUNCTURE: CPT | Performed by: INTERNAL MEDICINE

## 2017-06-02 PROCEDURE — 99244 OFF/OP CNSLTJ NEW/EST MOD 40: CPT | Performed by: INTERNAL MEDICINE

## 2017-06-02 PROCEDURE — 86038 ANTINUCLEAR ANTIBODIES: CPT | Performed by: INTERNAL MEDICINE

## 2017-06-02 PROCEDURE — 85652 RBC SED RATE AUTOMATED: CPT | Performed by: INTERNAL MEDICINE

## 2017-06-02 PROCEDURE — 73030 X-RAY EXAM OF SHOULDER: CPT | Performed by: RADIOLOGY

## 2017-06-02 PROCEDURE — 87340 HEPATITIS B SURFACE AG IA: CPT | Performed by: INTERNAL MEDICINE

## 2017-06-03 LAB
ANA SER QL IA: NEGATIVE
BACTERIA BLD CULT: ABNORMAL
CRP SERPL-MCNC: 0.4 MG/DL
GRAM STN SPEC: ABNORMAL
HBV CORE IGM SER QL: NEGATIVE
HBV SURFACE AG SER QL: NEGATIVE
HCV AB SER QL: NEGATIVE
REPORT STATUS (RPT): ABNORMAL
RHEUMATOID FACT SERPL-ACNC: <10 UNITS/ML
SPECIMEN SOURCE: ABNORMAL

## 2017-06-04 LAB — CCP AB SER IA-ACNC: 6 UNITS

## 2017-06-05 ENCOUNTER — TELEPHONE (OUTPATIENT)
Dept: INTERNAL MEDICINE | Age: 62
End: 2017-06-05

## 2017-06-05 ENCOUNTER — TELEPHONE (OUTPATIENT)
Dept: RHEUMATOLOGY | Age: 62
End: 2017-06-05

## 2017-06-06 ENCOUNTER — TELEPHONE (OUTPATIENT)
Dept: INTERNAL MEDICINE | Age: 62
End: 2017-06-06

## 2017-06-06 RX ORDER — FLUCONAZOLE 100 MG/1
100 TABLET ORAL 2 TIMES DAILY
Qty: 14 TABLET | Refills: 0 | Status: SHIPPED | OUTPATIENT
Start: 2017-06-06 | End: 2017-06-13

## 2017-06-08 ENCOUNTER — E-ADVICE (OUTPATIENT)
Dept: INTERNAL MEDICINE | Age: 62
End: 2017-06-08

## 2017-06-14 ENCOUNTER — OFFICE VISIT (OUTPATIENT)
Dept: INTERNAL MEDICINE | Age: 62
End: 2017-06-14

## 2017-06-14 ENCOUNTER — HOSPITAL ENCOUNTER (OUTPATIENT)
Dept: INFUSION THERAPY | Age: 62
Discharge: STILL ADMITTED OR ADMINISTRATIVE | End: 2017-06-14
Attending: INTERNAL MEDICINE

## 2017-06-14 ENCOUNTER — TELEPHONE (OUTPATIENT)
Dept: INTERNAL MEDICINE | Age: 62
End: 2017-06-14

## 2017-06-14 VITALS
HEART RATE: 106 BPM | DIASTOLIC BLOOD PRESSURE: 86 MMHG | HEIGHT: 64 IN | RESPIRATION RATE: 16 BRPM | BODY MASS INDEX: 47.15 KG/M2 | TEMPERATURE: 98.4 F | WEIGHT: 276.2 LBS | SYSTOLIC BLOOD PRESSURE: 130 MMHG

## 2017-06-14 DIAGNOSIS — I10 ESSENTIAL HYPERTENSION WITH GOAL BLOOD PRESSURE LESS THAN 140/90: ICD-10-CM

## 2017-06-14 DIAGNOSIS — J45.909 INTRINSIC ASTHMA: ICD-10-CM

## 2017-06-14 DIAGNOSIS — D80.1 HYPOGAMMAGLOBULINEMIA (CMD): ICD-10-CM

## 2017-06-14 DIAGNOSIS — E11.9 TYPE 2 DIABETES MELLITUS WITHOUT COMPLICATION, WITHOUT LONG-TERM CURRENT USE OF INSULIN (CMD): ICD-10-CM

## 2017-06-14 DIAGNOSIS — M51.36 DDD (DEGENERATIVE DISC DISEASE), LUMBAR: ICD-10-CM

## 2017-06-14 DIAGNOSIS — N18.30 CKD (CHRONIC KIDNEY DISEASE), STAGE III (CMD): ICD-10-CM

## 2017-06-14 DIAGNOSIS — M54.32 SCIATICA OF LEFT SIDE: Primary | ICD-10-CM

## 2017-06-14 PROCEDURE — 96523 IRRIG DRUG DELIVERY DEVICE: CPT

## 2017-06-14 PROCEDURE — 10002800 HB RX 250 W HCPCS: Performed by: INTERNAL MEDICINE

## 2017-06-14 PROCEDURE — 99214 OFFICE O/P EST MOD 30 MIN: CPT | Performed by: INTERNAL MEDICINE

## 2017-06-14 RX ORDER — TRAMADOL HYDROCHLORIDE 50 MG/1
50 TABLET ORAL EVERY 6 HOURS PRN
Qty: 50 TABLET | Refills: 3 | Status: SHIPPED | OUTPATIENT
Start: 2017-06-14 | End: 2017-10-04 | Stop reason: SDUPTHER

## 2017-06-14 RX ORDER — BUSPIRONE HYDROCHLORIDE 30 MG/1
30 TABLET ORAL 2 TIMES DAILY
Status: CANCELLED | OUTPATIENT
Start: 2017-06-14

## 2017-06-14 RX ORDER — BUPROPION HYDROCHLORIDE 150 MG/1
TABLET ORAL
Status: CANCELLED | OUTPATIENT
Start: 2017-06-14

## 2017-06-14 RX ADMIN — SODIUM CHLORIDE, PRESERVATIVE FREE 500 UNITS: 5 INJECTION INTRAVENOUS at 09:54

## 2017-06-15 ENCOUNTER — E-ADVICE (OUTPATIENT)
Dept: INTERNAL MEDICINE | Age: 62
End: 2017-06-15

## 2017-06-15 ENCOUNTER — IMAGING SERVICES (OUTPATIENT)
Dept: MAMMOGRAPHY | Age: 62
End: 2017-06-15
Attending: INTERNAL MEDICINE

## 2017-06-15 DIAGNOSIS — Z12.31 VISIT FOR SCREENING MAMMOGRAM: ICD-10-CM

## 2017-06-15 PROCEDURE — G0202 SCR MAMMO BI INCL CAD: HCPCS | Performed by: RADIOLOGY

## 2017-06-15 RX ORDER — FLUTICASONE PROPIONATE AND SALMETEROL 50; 250 UG/1; UG/1
POWDER RESPIRATORY (INHALATION)
Qty: 3 EACH | Refills: 1 | Status: SHIPPED | OUTPATIENT
Start: 2017-06-15 | End: 2017-10-19 | Stop reason: ALTCHOICE

## 2017-06-15 RX ORDER — PAROXETINE 30 MG/1
30 TABLET, FILM COATED ORAL 2 TIMES DAILY
Qty: 180 TABLET | Refills: 3 | Status: SHIPPED | OUTPATIENT
Start: 2017-06-15 | End: 2017-06-30 | Stop reason: ALTCHOICE

## 2017-06-15 RX ORDER — BUPROPION HYDROCHLORIDE 150 MG/1
TABLET ORAL
Qty: 270 TABLET | Refills: 3 | Status: SHIPPED | OUTPATIENT
Start: 2017-06-15 | End: 2018-04-25 | Stop reason: SDUPTHER

## 2017-06-15 RX ORDER — ACETAMINOPHEN AND CODEINE PHOSPHATE 300; 30 MG/1; MG/1
TABLET ORAL
Qty: 180 TABLET | Refills: 1 | Status: SHIPPED | OUTPATIENT
Start: 2017-06-15 | End: 2017-08-11 | Stop reason: SDUPTHER

## 2017-06-15 RX ORDER — BUSPIRONE HYDROCHLORIDE 30 MG/1
30 TABLET ORAL 2 TIMES DAILY
Qty: 180 TABLET | Refills: 3 | Status: SHIPPED | OUTPATIENT
Start: 2017-06-15 | End: 2018-02-10 | Stop reason: SDUPTHER

## 2017-06-26 ENCOUNTER — HOSPITAL ENCOUNTER (OUTPATIENT)
Dept: MRI IMAGING | Age: 62
Discharge: HOME | End: 2017-06-26
Attending: INTERNAL MEDICINE

## 2017-06-26 ENCOUNTER — TELEPHONE (OUTPATIENT)
Dept: INTERNAL MEDICINE | Age: 62
End: 2017-06-26

## 2017-06-26 DIAGNOSIS — M54.32 SCIATICA OF LEFT SIDE: ICD-10-CM

## 2017-06-26 DIAGNOSIS — M51.36 DDD (DEGENERATIVE DISC DISEASE), LUMBAR: ICD-10-CM

## 2017-06-26 PROCEDURE — 72148 MRI LUMBAR SPINE W/O DYE: CPT

## 2017-06-26 PROCEDURE — 72148 MRI LUMBAR SPINE W/O DYE: CPT | Performed by: RADIOLOGY

## 2017-06-27 ENCOUNTER — TELEPHONE (OUTPATIENT)
Dept: INTERNAL MEDICINE | Age: 62
End: 2017-06-27

## 2017-06-27 ENCOUNTER — OFFICE VISIT (OUTPATIENT)
Dept: INTERNAL MEDICINE | Age: 62
End: 2017-06-27

## 2017-06-27 ENCOUNTER — HOSPITAL ENCOUNTER (OUTPATIENT)
Age: 62
End: 2017-06-27
Attending: SURGERY | Admitting: SURGERY

## 2017-06-27 ENCOUNTER — PREP FOR CASE (OUTPATIENT)
Dept: SURGERY | Age: 62
End: 2017-06-27

## 2017-06-27 ENCOUNTER — IMAGING SERVICES (OUTPATIENT)
Dept: GENERAL RADIOLOGY | Age: 62
End: 2017-06-27
Attending: INTERNAL MEDICINE

## 2017-06-27 VITALS
DIASTOLIC BLOOD PRESSURE: 80 MMHG | BODY MASS INDEX: 47.08 KG/M2 | SYSTOLIC BLOOD PRESSURE: 140 MMHG | HEART RATE: 88 BPM | WEIGHT: 270 LBS

## 2017-06-27 DIAGNOSIS — S99.922A FOOT TRAUMA, LEFT, INITIAL ENCOUNTER: ICD-10-CM

## 2017-06-27 DIAGNOSIS — S99.922A FOOT TRAUMA, LEFT, INITIAL ENCOUNTER: Primary | ICD-10-CM

## 2017-06-27 DIAGNOSIS — Z86.010 HX OF COLONIC POLYPS: Primary | ICD-10-CM

## 2017-06-27 PROCEDURE — 73630 X-RAY EXAM OF FOOT: CPT | Performed by: RADIOLOGY

## 2017-06-27 PROCEDURE — 99213 OFFICE O/P EST LOW 20 MIN: CPT | Performed by: INTERNAL MEDICINE

## 2017-06-29 RX ORDER — 0.9 % SODIUM CHLORIDE 0.9 %
2 VIAL (ML) INJECTION PRN
Status: CANCELLED | OUTPATIENT
Start: 2017-06-29

## 2017-06-29 RX ORDER — 0.9 % SODIUM CHLORIDE 0.9 %
2 VIAL (ML) INJECTION EVERY 12 HOURS SCHEDULED
Status: CANCELLED | OUTPATIENT
Start: 2017-06-29

## 2017-06-30 ENCOUNTER — TELEPHONE (OUTPATIENT)
Dept: INTERNAL MEDICINE | Age: 62
End: 2017-06-30

## 2017-06-30 ENCOUNTER — OFFICE VISIT (OUTPATIENT)
Dept: RHEUMATOLOGY | Age: 62
End: 2017-06-30

## 2017-06-30 VITALS
TEMPERATURE: 98 F | DIASTOLIC BLOOD PRESSURE: 68 MMHG | BODY MASS INDEX: 47.43 KG/M2 | WEIGHT: 272 LBS | RESPIRATION RATE: 18 BRPM | SYSTOLIC BLOOD PRESSURE: 114 MMHG | HEART RATE: 80 BPM

## 2017-06-30 DIAGNOSIS — M19.012 PRIMARY OSTEOARTHRITIS OF SHOULDERS, BILATERAL: Primary | ICD-10-CM

## 2017-06-30 DIAGNOSIS — M19.011 PRIMARY OSTEOARTHRITIS OF SHOULDERS, BILATERAL: Primary | ICD-10-CM

## 2017-06-30 PROCEDURE — 20610 DRAIN/INJ JOINT/BURSA W/O US: CPT | Performed by: INTERNAL MEDICINE

## 2017-06-30 PROCEDURE — 99214 OFFICE O/P EST MOD 30 MIN: CPT | Performed by: INTERNAL MEDICINE

## 2017-06-30 RX ORDER — TRIAMCINOLONE ACETONIDE 40 MG/ML
40 INJECTION, SUSPENSION INTRA-ARTICULAR; INTRAMUSCULAR ONCE
Status: COMPLETED | OUTPATIENT
Start: 2017-06-30 | End: 2017-06-30

## 2017-06-30 RX ORDER — DULOXETIN HYDROCHLORIDE 60 MG/1
60 CAPSULE, DELAYED RELEASE ORAL 2 TIMES DAILY
Qty: 180 CAPSULE | Refills: 3 | Status: SHIPPED | OUTPATIENT
Start: 2017-06-30 | End: 2017-08-18 | Stop reason: SDUPTHER

## 2017-06-30 RX ADMIN — TRIAMCINOLONE ACETONIDE 40 MG: 40 INJECTION, SUSPENSION INTRA-ARTICULAR; INTRAMUSCULAR at 08:56

## 2017-07-11 PROCEDURE — 10004348 HB COUNTER-EVAL PRE-OP

## 2017-07-11 ASSESSMENT — ACTIVITIES OF DAILY LIVING (ADL)
ADL_BEFORE_ADMISSION: INDEPENDENT
ADL_SCORE: 12
HISTORY OF FALLING IN THE LAST YEAR (PRIOR TO ADMISSION): NO
RECENT_DECLINE_ADL: NO
SENSORY_SUPPORT_DEVICES: EYEGLASSES
NEEDS_ASSIST: NO
ADL_SHORT_OF_BREATH: NO
CHRONIC_PAIN_PRESENT: YES, CHRONIC
DESCRIBE HOW PAIN IMPACTS YOUR LIFE: PHYSICAL ACTIVITY;ENERGY LEVEL

## 2017-07-12 ENCOUNTER — HOSPITAL ENCOUNTER (OUTPATIENT)
Dept: INFUSION THERAPY | Age: 62
Discharge: STILL ADMITTED OR ADMINISTRATIVE | End: 2017-07-12
Attending: INTERNAL MEDICINE

## 2017-07-12 PROCEDURE — 99211 OFF/OP EST MAY X REQ PHY/QHP: CPT

## 2017-07-25 ENCOUNTER — HOSPITAL ENCOUNTER (OUTPATIENT)
Dept: INFUSION THERAPY | Age: 62
Discharge: STILL ADMITTED OR ADMINISTRATIVE | End: 2017-07-25
Attending: INTERNAL MEDICINE

## 2017-07-25 PROCEDURE — 96523 IRRIG DRUG DELIVERY DEVICE: CPT

## 2017-07-25 PROCEDURE — 10002800 HB RX 250 W HCPCS: Performed by: INTERNAL MEDICINE

## 2017-07-25 PROCEDURE — 99211 OFF/OP EST MAY X REQ PHY/QHP: CPT

## 2017-07-25 RX ADMIN — SODIUM CHLORIDE, PRESERVATIVE FREE 500 UNITS: 5 INJECTION INTRAVENOUS at 08:29

## 2017-07-28 ENCOUNTER — OFFICE VISIT (OUTPATIENT)
Dept: INTERNAL MEDICINE | Age: 62
End: 2017-07-28

## 2017-07-28 VITALS
DIASTOLIC BLOOD PRESSURE: 64 MMHG | TEMPERATURE: 99 F | BODY MASS INDEX: 50.22 KG/M2 | HEART RATE: 96 BPM | WEIGHT: 288 LBS | SYSTOLIC BLOOD PRESSURE: 104 MMHG

## 2017-07-28 DIAGNOSIS — R60.0 EDEMA OF BOTH LEGS: Primary | ICD-10-CM

## 2017-07-28 DIAGNOSIS — D80.1 HYPOGAMMAGLOBULINEMIA (CMD): ICD-10-CM

## 2017-07-28 DIAGNOSIS — N18.30 CKD (CHRONIC KIDNEY DISEASE), STAGE III (CMD): ICD-10-CM

## 2017-07-28 PROCEDURE — 99213 OFFICE O/P EST LOW 20 MIN: CPT | Performed by: INTERNAL MEDICINE

## 2017-07-28 RX ORDER — FUROSEMIDE 40 MG/1
80 TABLET ORAL DAILY
Qty: 180 TABLET | Refills: 3 | Status: SHIPPED | COMMUNITY
Start: 2017-07-28 | End: 2017-07-30 | Stop reason: ALTCHOICE

## 2017-07-28 RX ORDER — DOXYCYCLINE HYCLATE 100 MG/1
100 CAPSULE ORAL 2 TIMES DAILY
Qty: 20 CAPSULE | Refills: 0 | Status: SHIPPED | OUTPATIENT
Start: 2017-07-28 | End: 2017-09-27 | Stop reason: SDUPTHER

## 2017-07-28 RX ORDER — POTASSIUM CITRATE 10 MEQ/1
1080 TABLET, EXTENDED RELEASE ORAL
Qty: 180 TABLET | Refills: 3 | Status: SHIPPED | COMMUNITY
Start: 2017-07-28 | End: 2018-11-08 | Stop reason: SDUPTHER

## 2017-07-30 ENCOUNTER — APPOINTMENT (OUTPATIENT)
Dept: LAB | Age: 62
End: 2017-07-30

## 2017-07-30 ENCOUNTER — IMAGING SERVICES (OUTPATIENT)
Dept: GENERAL RADIOLOGY | Age: 62
End: 2017-07-30
Attending: NURSE PRACTITIONER

## 2017-07-30 ENCOUNTER — WALK IN (OUTPATIENT)
Dept: URGENT CARE | Age: 62
End: 2017-07-30

## 2017-07-30 VITALS
HEART RATE: 94 BPM | DIASTOLIC BLOOD PRESSURE: 60 MMHG | SYSTOLIC BLOOD PRESSURE: 136 MMHG | WEIGHT: 289.4 LBS | OXYGEN SATURATION: 95 % | BODY MASS INDEX: 50.46 KG/M2 | RESPIRATION RATE: 18 BRPM | TEMPERATURE: 98 F

## 2017-07-30 DIAGNOSIS — R60.0 BILATERAL LOWER EXTREMITY EDEMA: ICD-10-CM

## 2017-07-30 DIAGNOSIS — R05.8 PRODUCTIVE COUGH: Primary | ICD-10-CM

## 2017-07-30 DIAGNOSIS — J45.41 AB (ASTHMATIC BRONCHITIS), MODERATE PERSISTENT, WITH ACUTE EXACERBATION: ICD-10-CM

## 2017-07-30 LAB
ALBUMIN SERPL-MCNC: 3.8 G/DL (ref 3.6–5.1)
ALBUMIN/GLOB SERPL: 1.2 {RATIO} (ref 1–2.4)
ALP SERPL-CCNC: 84 UNITS/L (ref 45–117)
ALT SERPL-CCNC: 33 UNITS/L
ANION GAP SERPL CALC-SCNC: 16 MMOL/L (ref 10–20)
AST SERPL-CCNC: 43 UNITS/L
BASOPHILS # BLD AUTO: 0 K/MCL (ref 0–0.3)
BASOPHILS NFR BLD AUTO: 1 %
BILIRUB SERPL-MCNC: 0.5 MG/DL (ref 0.2–1)
BNP SERPL-MCNC: 111 PG/ML
BUN SERPL-MCNC: 15 MG/DL (ref 6–20)
BUN/CREAT SERPL: 12 (ref 7–25)
CALCIUM SERPL-MCNC: 8.9 MG/DL (ref 8.4–10.2)
CHLORIDE SERPL-SCNC: 95 MMOL/L (ref 98–107)
CO2 SERPL-SCNC: 29 MMOL/L (ref 21–32)
CREAT SERPL-MCNC: 1.3 MG/DL (ref 0.51–0.95)
DIFFERENTIAL METHOD BLD: ABNORMAL
EOSINOPHIL # BLD AUTO: 0.2 K/MCL (ref 0.1–0.5)
EOSINOPHIL NFR SPEC: 3 %
ERYTHROCYTE [DISTWIDTH] IN BLOOD: 13.1 % (ref 11–15)
FASTING STATUS PATIENT QL REPORTED: ABNORMAL HRS
GLOBULIN SER-MCNC: 3.1 G/DL (ref 2–4)
GLUCOSE SERPL-MCNC: 105 MG/DL (ref 65–99)
HCT VFR BLD CALC: 34.4 % (ref 36–46.5)
HGB BLD-MCNC: 11.7 G/DL (ref 12–15.5)
LYMPHOCYTES # BLD MANUAL: 1.3 K/MCL (ref 1–4)
LYMPHOCYTES NFR BLD MANUAL: 23 %
MCH RBC QN AUTO: 31.2 PG (ref 26–34)
MCHC RBC AUTO-ENTMCNC: 34 G/DL (ref 32–36.5)
MCV RBC AUTO: 91.7 FL (ref 78–100)
MONOCYTES # BLD MANUAL: 0.5 K/MCL (ref 0.3–0.9)
MONOCYTES NFR BLD MANUAL: 9 %
NEUTROPHILS # BLD AUTO: 3.4 K/MCL (ref 1.8–7.7)
NEUTROPHILS NFR BLD AUTO: 64 %
PLATELET # BLD: 216 K/MCL (ref 140–450)
POTASSIUM SERPL-SCNC: 5 MMOL/L (ref 3.4–5.1)
PROT SERPL-MCNC: 6.9 G/DL (ref 6.4–8.2)
RBC # BLD: 3.75 MIL/MCL (ref 4–5.2)
SODIUM SERPL-SCNC: 135 MMOL/L (ref 135–145)
WBC # BLD: 5.4 K/MCL (ref 4.2–11)

## 2017-07-30 PROCEDURE — 71020 XR CHEST PA AND LATERAL: CPT | Performed by: RADIOLOGY

## 2017-07-30 PROCEDURE — 99214 OFFICE O/P EST MOD 30 MIN: CPT | Performed by: NURSE PRACTITIONER

## 2017-07-30 PROCEDURE — 85025 COMPLETE CBC W/AUTO DIFF WBC: CPT | Performed by: INTERNAL MEDICINE

## 2017-07-30 PROCEDURE — 83880 ASSAY OF NATRIURETIC PEPTIDE: CPT | Performed by: INTERNAL MEDICINE

## 2017-07-30 PROCEDURE — 36415 COLL VENOUS BLD VENIPUNCTURE: CPT | Performed by: INTERNAL MEDICINE

## 2017-07-30 PROCEDURE — 94640 AIRWAY INHALATION TREATMENT: CPT | Performed by: NURSE PRACTITIONER

## 2017-07-30 PROCEDURE — 96372 THER/PROPH/DIAG INJ SC/IM: CPT | Performed by: NURSE PRACTITIONER

## 2017-07-30 PROCEDURE — 80053 COMPREHEN METABOLIC PANEL: CPT | Performed by: INTERNAL MEDICINE

## 2017-07-30 RX ORDER — BUMETANIDE 1 MG/1
1 TABLET ORAL 2 TIMES DAILY
Qty: 30 TABLET | Refills: 0 | Status: SHIPPED | OUTPATIENT
Start: 2017-07-30 | End: 2017-07-31 | Stop reason: ALTCHOICE

## 2017-07-30 RX ORDER — PREDNISONE 20 MG/1
TABLET ORAL
Qty: 10 TABLET | Refills: 0 | Status: SHIPPED | OUTPATIENT
Start: 2017-07-30 | End: 2017-08-18 | Stop reason: ALTCHOICE

## 2017-07-30 RX ORDER — IPRATROPIUM BROMIDE AND ALBUTEROL SULFATE 2.5; .5 MG/3ML; MG/3ML
3 SOLUTION RESPIRATORY (INHALATION) ONCE
Status: COMPLETED | OUTPATIENT
Start: 2017-07-30 | End: 2017-07-30

## 2017-07-30 RX ORDER — METHYLPREDNISOLONE SODIUM SUCCINATE 125 MG/2ML
125 INJECTION, POWDER, LYOPHILIZED, FOR SOLUTION INTRAMUSCULAR; INTRAVENOUS ONCE
Status: COMPLETED | OUTPATIENT
Start: 2017-07-30 | End: 2017-07-30

## 2017-07-30 RX ORDER — FUROSEMIDE 10 MG/ML
40 INJECTION INTRAMUSCULAR; INTRAVENOUS ONCE
Status: COMPLETED | OUTPATIENT
Start: 2017-07-30 | End: 2017-07-30

## 2017-07-30 RX ADMIN — FUROSEMIDE 40 MG: 10 INJECTION INTRAMUSCULAR; INTRAVENOUS at 13:21

## 2017-07-30 RX ADMIN — IPRATROPIUM BROMIDE AND ALBUTEROL SULFATE 3 ML: 2.5; .5 SOLUTION RESPIRATORY (INHALATION) at 12:50

## 2017-07-30 RX ADMIN — METHYLPREDNISOLONE SODIUM SUCCINATE 125 MG: 125 INJECTION, POWDER, LYOPHILIZED, FOR SOLUTION INTRAMUSCULAR; INTRAVENOUS at 13:22

## 2017-07-30 ASSESSMENT — ENCOUNTER SYMPTOMS
NAUSEA: 0
ROS SKIN COMMENTS: EASY BRUISING.
WHEEZING: 1
ABDOMINAL PAIN: 0
SHORTNESS OF BREATH: 1
COUGH: 1
FEVER: 1
CHEST TIGHTNESS: 1
EYE DISCHARGE: 0
ACTIVITY CHANGE: 1
EYE REDNESS: 0
SINUS PRESSURE: 1
CHILLS: 1
SLEEP DISTURBANCE: 1
VOMITING: 0
HEADACHES: 0
SORE THROAT: 1
VOICE CHANGE: 1

## 2017-07-31 ENCOUNTER — E-ADVICE (OUTPATIENT)
Dept: INTERNAL MEDICINE | Age: 62
End: 2017-07-31

## 2017-07-31 RX ORDER — ALBUTEROL SULFATE 90 UG/1
2 AEROSOL, METERED RESPIRATORY (INHALATION) EVERY 4 HOURS PRN
Qty: 3 INHALER | Refills: 3 | Status: ON HOLD | OUTPATIENT
Start: 2017-07-31 | End: 2018-07-30 | Stop reason: ALTCHOICE

## 2017-07-31 RX ORDER — FUROSEMIDE 20 MG/1
20 TABLET ORAL 2 TIMES DAILY
Qty: 20 TABLET | Refills: 0 | Status: SHIPPED
Start: 2017-07-31 | End: 2017-08-02 | Stop reason: DRUGHIGH

## 2017-08-01 ENCOUNTER — TELEPHONE (OUTPATIENT)
Dept: URGENT CARE | Age: 62
End: 2017-08-01

## 2017-08-02 ENCOUNTER — OFFICE VISIT (OUTPATIENT)
Dept: INTERNAL MEDICINE | Age: 62
End: 2017-08-02

## 2017-08-02 VITALS
TEMPERATURE: 98.8 F | DIASTOLIC BLOOD PRESSURE: 70 MMHG | WEIGHT: 274 LBS | SYSTOLIC BLOOD PRESSURE: 110 MMHG | BODY MASS INDEX: 47.78 KG/M2 | HEART RATE: 96 BPM

## 2017-08-02 DIAGNOSIS — N18.30 CKD (CHRONIC KIDNEY DISEASE), STAGE III (CMD): ICD-10-CM

## 2017-08-02 DIAGNOSIS — I10 ESSENTIAL HYPERTENSION WITH GOAL BLOOD PRESSURE LESS THAN 140/90: Primary | ICD-10-CM

## 2017-08-02 PROCEDURE — 99213 OFFICE O/P EST LOW 20 MIN: CPT | Performed by: INTERNAL MEDICINE

## 2017-08-02 RX ORDER — FLUCONAZOLE 100 MG/1
100 TABLET ORAL 2 TIMES DAILY
Qty: 14 TABLET | Refills: 1 | Status: SHIPPED | OUTPATIENT
Start: 2017-08-02 | End: 2017-10-05 | Stop reason: SDUPTHER

## 2017-08-02 RX ORDER — PREDNISONE 20 MG/1
20 TABLET ORAL DAILY
Qty: 30 TABLET | Refills: 0 | Status: SHIPPED | OUTPATIENT
Start: 2017-08-02 | End: 2017-08-18

## 2017-08-02 RX ORDER — FUROSEMIDE 40 MG/1
40 TABLET ORAL DAILY
Qty: 30 TABLET | Refills: 11 | Status: SHIPPED | COMMUNITY
Start: 2017-08-02 | End: 2018-05-29 | Stop reason: SDUPTHER

## 2017-08-07 ENCOUNTER — E-ADVICE (OUTPATIENT)
Dept: INTERNAL MEDICINE | Age: 62
End: 2017-08-07

## 2017-08-09 ENCOUNTER — OFFICE VISIT (OUTPATIENT)
Dept: CHIROPRACTIC MEDICINE | Age: 62
End: 2017-08-09

## 2017-08-09 VITALS
WEIGHT: 274 LBS | BODY MASS INDEX: 45.65 KG/M2 | HEIGHT: 65 IN | DIASTOLIC BLOOD PRESSURE: 70 MMHG | SYSTOLIC BLOOD PRESSURE: 128 MMHG

## 2017-08-09 DIAGNOSIS — M99.01 CERVICAL SOMATIC DYSFUNCTION: ICD-10-CM

## 2017-08-09 DIAGNOSIS — M99.05 PELVIC SOMATIC DYSFUNCTION: ICD-10-CM

## 2017-08-09 DIAGNOSIS — M54.50 ACUTE BILATERAL LOW BACK PAIN WITHOUT SCIATICA: Primary | ICD-10-CM

## 2017-08-09 DIAGNOSIS — M54.2 CERVICALGIA: ICD-10-CM

## 2017-08-09 DIAGNOSIS — M99.03 LUMBAR REGION SOMATIC DYSFUNCTION: ICD-10-CM

## 2017-08-09 DIAGNOSIS — M99.04 SACRAL REGION SOMATIC DYSFUNCTION: ICD-10-CM

## 2017-08-09 PROCEDURE — 98941 CHIROPRACT MANJ 3-4 REGIONS: CPT | Performed by: CHIROPRACTOR

## 2017-08-09 PROCEDURE — 99202 OFFICE O/P NEW SF 15 MIN: CPT | Performed by: CHIROPRACTOR

## 2017-08-10 ENCOUNTER — TELEPHONE (OUTPATIENT)
Dept: INTERNAL MEDICINE | Age: 62
End: 2017-08-10

## 2017-08-11 ENCOUNTER — OFFICE VISIT (OUTPATIENT)
Dept: CHIROPRACTIC MEDICINE | Age: 62
End: 2017-08-11

## 2017-08-11 DIAGNOSIS — M53.3 SACRAL PAIN: Primary | ICD-10-CM

## 2017-08-11 DIAGNOSIS — M99.05 PELVIC SOMATIC DYSFUNCTION: ICD-10-CM

## 2017-08-11 DIAGNOSIS — M99.03 LUMBAR REGION SOMATIC DYSFUNCTION: ICD-10-CM

## 2017-08-11 DIAGNOSIS — M99.04 SACRAL REGION SOMATIC DYSFUNCTION: ICD-10-CM

## 2017-08-11 PROCEDURE — 98941 CHIROPRACT MANJ 3-4 REGIONS: CPT | Performed by: CHIROPRACTOR

## 2017-08-11 RX ORDER — ACETAMINOPHEN AND CODEINE PHOSPHATE 300; 30 MG/1; MG/1
1-2 TABLET ORAL PRN
Qty: 180 TABLET | Refills: 1 | Status: SHIPPED | OUTPATIENT
Start: 2017-08-11 | End: 2017-10-16 | Stop reason: SDUPTHER

## 2017-08-14 ENCOUNTER — LAB SERVICES (OUTPATIENT)
Dept: LAB | Age: 62
End: 2017-08-14

## 2017-08-14 ENCOUNTER — OFFICE VISIT (OUTPATIENT)
Dept: CHIROPRACTIC MEDICINE | Age: 62
End: 2017-08-14

## 2017-08-14 DIAGNOSIS — M54.50 ACUTE LEFT-SIDED LOW BACK PAIN WITHOUT SCIATICA: ICD-10-CM

## 2017-08-14 DIAGNOSIS — M99.04 SACRAL REGION SOMATIC DYSFUNCTION: ICD-10-CM

## 2017-08-14 DIAGNOSIS — M99.03 SEGMENTAL AND SOMATIC DYSFUNCTION OF LUMBAR REGION: ICD-10-CM

## 2017-08-14 DIAGNOSIS — M99.01 SEGMENTAL AND SOMATIC DYSFUNCTION OF CERVICAL REGION: ICD-10-CM

## 2017-08-14 DIAGNOSIS — M54.2 CERVICALGIA: Primary | ICD-10-CM

## 2017-08-14 DIAGNOSIS — N18.30 CKD (CHRONIC KIDNEY DISEASE), STAGE III (CMD): ICD-10-CM

## 2017-08-14 LAB
ALBUMIN SERPL-MCNC: 4.2 G/DL (ref 3.6–5.1)
ALBUMIN/GLOB SERPL: 1.4 {RATIO} (ref 1–2.4)
ALP SERPL-CCNC: 78 UNITS/L (ref 45–117)
ALT SERPL-CCNC: 43 UNITS/L
ANION GAP SERPL CALC-SCNC: 14 MMOL/L (ref 10–20)
AST SERPL-CCNC: 33 UNITS/L
BILIRUB SERPL-MCNC: 0.4 MG/DL (ref 0.2–1)
BUN SERPL-MCNC: 16 MG/DL (ref 6–20)
BUN/CREAT SERPL: 11 (ref 7–25)
CALCIUM SERPL-MCNC: 8.9 MG/DL (ref 8.4–10.2)
CHLORIDE SERPL-SCNC: 99 MMOL/L (ref 98–107)
CO2 SERPL-SCNC: 28 MMOL/L (ref 21–32)
CREAT SERPL-MCNC: 1.47 MG/DL (ref 0.51–0.95)
FASTING STATUS PATIENT QL REPORTED: 3 HRS
GLOBULIN SER-MCNC: 3 G/DL (ref 2–4)
GLUCOSE SERPL-MCNC: 56 MG/DL (ref 65–99)
POTASSIUM SERPL-SCNC: 4.3 MMOL/L (ref 3.4–5.1)
PROT SERPL-MCNC: 7.2 G/DL (ref 6.4–8.2)
SODIUM SERPL-SCNC: 137 MMOL/L (ref 135–145)

## 2017-08-14 PROCEDURE — 80053 COMPREHEN METABOLIC PANEL: CPT | Performed by: INTERNAL MEDICINE

## 2017-08-14 PROCEDURE — 98941 CHIROPRACT MANJ 3-4 REGIONS: CPT | Performed by: CHIROPRACTOR

## 2017-08-14 PROCEDURE — 36415 COLL VENOUS BLD VENIPUNCTURE: CPT | Performed by: INTERNAL MEDICINE

## 2017-08-16 ENCOUNTER — TELEPHONE (OUTPATIENT)
Dept: INTERNAL MEDICINE | Age: 62
End: 2017-08-16

## 2017-08-17 ENCOUNTER — TELEPHONE (OUTPATIENT)
Dept: INTERNAL MEDICINE | Age: 62
End: 2017-08-17

## 2017-08-18 ENCOUNTER — HOSPITAL ENCOUNTER (EMERGENCY)
Age: 62
Discharge: HOME | End: 2017-08-18
Attending: EMERGENCY MEDICINE

## 2017-08-18 ENCOUNTER — TELEPHONE (OUTPATIENT)
Dept: INTERNAL MEDICINE | Age: 62
End: 2017-08-18

## 2017-08-18 VITALS
RESPIRATION RATE: 20 BRPM | SYSTOLIC BLOOD PRESSURE: 130 MMHG | DIASTOLIC BLOOD PRESSURE: 72 MMHG | OXYGEN SATURATION: 95 % | HEART RATE: 87 BPM | TEMPERATURE: 98.3 F

## 2017-08-18 DIAGNOSIS — S01.312A LACERATION OF EAR, LEFT, INITIAL ENCOUNTER: ICD-10-CM

## 2017-08-18 DIAGNOSIS — S09.90XA CHI (CLOSED HEAD INJURY), INITIAL ENCOUNTER: Primary | ICD-10-CM

## 2017-08-18 DIAGNOSIS — S00.511A ABRASION OF LIP, INITIAL ENCOUNTER: ICD-10-CM

## 2017-08-18 PROCEDURE — 12011 RPR F/E/E/N/L/M 2.5 CM/<: CPT | Performed by: PHYSICIAN ASSISTANT

## 2017-08-18 PROCEDURE — 99282 EMERGENCY DEPT VISIT SF MDM: CPT | Performed by: EMERGENCY MEDICINE

## 2017-08-18 PROCEDURE — 10004348 HB COUNTER-EVAL PRE-OP

## 2017-08-18 PROCEDURE — 99283 EMERGENCY DEPT VISIT LOW MDM: CPT

## 2017-08-18 PROCEDURE — 10002526 HB LACERATION REPAIR-SIMPLE

## 2017-08-18 RX ORDER — LIDOCAINE HYDROCHLORIDE 20 MG/ML
INJECTION, SOLUTION INFILTRATION; PERINEURAL
Status: DISCONTINUED
Start: 2017-08-18 | End: 2017-08-18 | Stop reason: HOSPADM

## 2017-08-18 RX ORDER — DULOXETIN HYDROCHLORIDE 60 MG/1
60 CAPSULE, DELAYED RELEASE ORAL 2 TIMES DAILY
Qty: 180 CAPSULE | Refills: 3 | Status: SHIPPED | OUTPATIENT
Start: 2017-08-18 | End: 2018-04-25 | Stop reason: SDUPTHER

## 2017-08-18 ASSESSMENT — ENCOUNTER SYMPTOMS
CHEST TIGHTNESS: 0
BACK PAIN: 0
VOMITING: 0
LIGHT-HEADEDNESS: 0
PHOTOPHOBIA: 0
NUMBNESS: 0
RHINORRHEA: 0
SHORTNESS OF BREATH: 0
SORE THROAT: 0
COLOR CHANGE: 0
ACTIVITY CHANGE: 0
WOUND: 0
NAUSEA: 0
NERVOUS/ANXIOUS: 0
FATIGUE: 0
APPETITE CHANGE: 0
FEVER: 0
CONFUSION: 0
ABDOMINAL PAIN: 0
ADENOPATHY: 0
DIARRHEA: 0
DIZZINESS: 0
CHILLS: 0
WEAKNESS: 0
SINUS PRESSURE: 0
HEADACHES: 0
BRUISES/BLEEDS EASILY: 0

## 2017-08-18 ASSESSMENT — ACTIVITIES OF DAILY LIVING (ADL)
HISTORY OF FALLING IN THE LAST YEAR (PRIOR TO ADMISSION): NO
SENSORY_SUPPORT_DEVICES: EYEGLASSES
ADL_SCORE: 12
CHRONIC_PAIN_PRESENT: NO;YES, CHRONIC
RECENT_DECLINE_ADL: NO
ADL_SHORT_OF_BREATH: NO
ADL_BEFORE_ADMISSION: INDEPENDENT
DESCRIBE HOW PAIN IMPACTS YOUR LIFE: PHYSICAL ACTIVITY
NEEDS_ASSIST: NO

## 2017-08-21 ENCOUNTER — OFFICE VISIT (OUTPATIENT)
Dept: CHIROPRACTIC MEDICINE | Age: 62
End: 2017-08-21

## 2017-08-21 DIAGNOSIS — M54.50 ACUTE LEFT-SIDED LOW BACK PAIN WITHOUT SCIATICA: ICD-10-CM

## 2017-08-21 DIAGNOSIS — M99.01 CERVICAL SOMATIC DYSFUNCTION: ICD-10-CM

## 2017-08-21 DIAGNOSIS — M99.03 LUMBAR REGION SOMATIC DYSFUNCTION: ICD-10-CM

## 2017-08-21 DIAGNOSIS — M54.2 CERVICALGIA: Primary | ICD-10-CM

## 2017-08-21 PROCEDURE — 98940 CHIROPRACT MANJ 1-2 REGIONS: CPT | Performed by: CHIROPRACTOR

## 2017-08-22 ENCOUNTER — HOSPITAL ENCOUNTER (OUTPATIENT)
Dept: INFUSION THERAPY | Age: 62
Discharge: STILL ADMITTED OR ADMINISTRATIVE | End: 2017-08-22
Attending: INTERNAL MEDICINE

## 2017-08-22 DIAGNOSIS — N18.30 CKD (CHRONIC KIDNEY DISEASE), STAGE III (CMD): ICD-10-CM

## 2017-08-22 DIAGNOSIS — D80.1 HYPOGAMMAGLOBULINEMIA (CMD): ICD-10-CM

## 2017-08-22 DIAGNOSIS — N20.0 NEPHROLITHIASIS: ICD-10-CM

## 2017-08-22 DIAGNOSIS — I10 ESSENTIAL HYPERTENSION WITH GOAL BLOOD PRESSURE LESS THAN 140/90: ICD-10-CM

## 2017-08-22 DIAGNOSIS — R25.2 CRAMP OF BOTH LOWER EXTREMITIES: ICD-10-CM

## 2017-08-22 LAB
ALBUMIN SERPL-MCNC: 3.7 G/DL (ref 3.6–5.1)
ANION GAP SERPL CALC-SCNC: 9 MMOL/L (ref 10–20)
BUN SERPL-MCNC: 17 MG/DL (ref 6–20)
BUN/CREAT SERPL: 13 (ref 7–25)
CALCIUM SERPL-MCNC: 8.4 MG/DL (ref 8.4–10.2)
CHLORIDE SERPL-SCNC: 99 MMOL/L (ref 98–107)
CO2 SERPL-SCNC: 29 MMOL/L (ref 21–32)
CREAT SERPL-MCNC: 1.34 MG/DL (ref 0.51–0.95)
GLUCOSE SERPL-MCNC: 129 MG/DL (ref 65–99)
MAGNESIUM SERPL-MCNC: 2.3 MG/DL (ref 1.7–2.4)
PHOSPHATE SERPL-MCNC: 4.1 MG/DL (ref 2.4–4.7)
POTASSIUM SERPL-SCNC: 4.4 MMOL/L (ref 3.4–5.1)
SODIUM SERPL-SCNC: 133 MMOL/L (ref 135–145)

## 2017-08-22 PROCEDURE — 83735 ASSAY OF MAGNESIUM: CPT

## 2017-08-22 PROCEDURE — 80069 RENAL FUNCTION PANEL: CPT

## 2017-08-22 PROCEDURE — 36592 COLLECT BLOOD FROM PICC: CPT

## 2017-08-22 PROCEDURE — 82784 ASSAY IGA/IGD/IGG/IGM EACH: CPT

## 2017-08-22 PROCEDURE — 10002800 HB RX 250 W HCPCS: Performed by: INTERNAL MEDICINE

## 2017-08-22 RX ADMIN — SODIUM CHLORIDE, PRESERVATIVE FREE 500 UNITS: 5 INJECTION INTRAVENOUS at 08:55

## 2017-08-22 ASSESSMENT — PAIN SCALES - GENERAL: PAINLEVEL_OUTOF10: 8

## 2017-08-23 ENCOUNTER — E-ADVICE (OUTPATIENT)
Dept: INTERNAL MEDICINE | Age: 62
End: 2017-08-23

## 2017-08-23 ENCOUNTER — TELEPHONE (OUTPATIENT)
Dept: INTERNAL MEDICINE | Age: 62
End: 2017-08-23

## 2017-08-23 DIAGNOSIS — E08.9 DIABETES MELLITUS DUE TO UNDERLYING CONDITION WITHOUT COMPLICATION, WITHOUT LONG-TERM CURRENT USE OF INSULIN (CMD): Primary | ICD-10-CM

## 2017-08-23 LAB
IGA SERPL-MCNC: 125 MG/DL (ref 82–453)
IGG SERPL-MCNC: 664 MG/DL (ref 751–1560)
IGM SERPL-MCNC: 22 MG/DL (ref 46–304)

## 2017-08-23 RX ORDER — LEVOTHYROXINE SODIUM 0.05 MG/1
TABLET ORAL
Qty: 90 TABLET | Refills: 1 | Status: SHIPPED | OUTPATIENT
Start: 2017-08-23 | End: 2018-02-10 | Stop reason: SDUPTHER

## 2017-08-24 ENCOUNTER — ANESTHESIA EVENT (OUTPATIENT)
Dept: SURGERY | Age: 62
End: 2017-08-24

## 2017-08-25 ENCOUNTER — OFF PREMISE (OUTPATIENT)
Dept: SURGERY | Age: 62
End: 2017-08-25

## 2017-08-25 ENCOUNTER — OFFICE VISIT (OUTPATIENT)
Dept: INTERNAL MEDICINE | Age: 62
End: 2017-08-25

## 2017-08-25 ENCOUNTER — ANESTHESIA (OUTPATIENT)
Dept: SURGERY | Age: 62
End: 2017-08-25

## 2017-08-25 ENCOUNTER — HOSPITAL ENCOUNTER (OUTPATIENT)
Age: 62
Discharge: HOME | End: 2017-08-25
Attending: SURGERY | Admitting: SURGERY

## 2017-08-25 VITALS — DIASTOLIC BLOOD PRESSURE: 70 MMHG | HEART RATE: 96 BPM | SYSTOLIC BLOOD PRESSURE: 110 MMHG

## 2017-08-25 DIAGNOSIS — J20.9 ACUTE BRONCHITIS, UNSPECIFIED ORGANISM: ICD-10-CM

## 2017-08-25 DIAGNOSIS — D80.1 HYPOGAMMAGLOBULINEMIA (CMD): ICD-10-CM

## 2017-08-25 DIAGNOSIS — S01.312D: Primary | ICD-10-CM

## 2017-08-25 DIAGNOSIS — D12.3 BENIGN NEOPLASM OF TRANSVERSE COLON: Primary | ICD-10-CM

## 2017-08-25 LAB — GLUCOSE BLDC GLUCOMTR-MCNC: 128 MG/DL (ref 65–99)

## 2017-08-25 PROCEDURE — 45385 COLONOSCOPY W/LESION REMOVAL: CPT | Performed by: ANESTHESIOLOGIST ASSISTANT

## 2017-08-25 PROCEDURE — 10002800 HB RX 250 W HCPCS: Performed by: ANESTHESIOLOGIST ASSISTANT

## 2017-08-25 PROCEDURE — 82962 GLUCOSE BLOOD TEST: CPT

## 2017-08-25 PROCEDURE — 45385 COLONOSCOPY W/LESION REMOVAL: CPT | Performed by: SURGERY

## 2017-08-25 PROCEDURE — 45385 COLONOSCOPY W/LESION REMOVAL: CPT | Performed by: ANESTHESIOLOGY

## 2017-08-25 PROCEDURE — 10004560 HB COUNTER-EXTENDED RECOVERY PER HOUR

## 2017-08-25 PROCEDURE — 10002801 HB RX 250 W/O HCPCS: Performed by: ANESTHESIOLOGIST ASSISTANT

## 2017-08-25 PROCEDURE — 10002362 HB ANESTH MAC IV 1ST 1/2 HR: Performed by: SURGERY

## 2017-08-25 PROCEDURE — 88305 TISSUE EXAM BY PATHOLOGIST: CPT

## 2017-08-25 PROCEDURE — 10001568 HB ANESTH MAC IV ADD'L 1/2 HR: Performed by: SURGERY

## 2017-08-25 PROCEDURE — 10002807 HB RX 258: Performed by: ANESTHESIOLOGY

## 2017-08-25 PROCEDURE — 99212 OFFICE O/P EST SF 10 MIN: CPT | Performed by: INTERNAL MEDICINE

## 2017-08-25 PROCEDURE — 10004316 HB COUNTER-PREP

## 2017-08-25 PROCEDURE — 10003428 HB COLONOSCOPY: Performed by: SURGERY

## 2017-08-25 RX ORDER — 0.9 % SODIUM CHLORIDE 0.9 %
2 VIAL (ML) INJECTION EVERY 12 HOURS SCHEDULED
Status: DISCONTINUED | OUTPATIENT
Start: 2017-08-25 | End: 2017-08-25 | Stop reason: HOSPADM

## 2017-08-25 RX ORDER — NICOTINE POLACRILEX 4 MG
30 LOZENGE BUCCAL
Status: DISCONTINUED | OUTPATIENT
Start: 2017-08-25 | End: 2017-08-25 | Stop reason: HOSPADM

## 2017-08-25 RX ORDER — LABETALOL HYDROCHLORIDE 5 MG/ML
5 INJECTION, SOLUTION INTRAVENOUS EVERY 10 MIN PRN
Status: DISCONTINUED | OUTPATIENT
Start: 2017-08-25 | End: 2017-08-25 | Stop reason: HOSPADM

## 2017-08-25 RX ORDER — SODIUM CHLORIDE, SODIUM LACTATE, POTASSIUM CHLORIDE, CALCIUM CHLORIDE 600; 310; 30; 20 MG/100ML; MG/100ML; MG/100ML; MG/100ML
INJECTION, SOLUTION INTRAVENOUS CONTINUOUS
Status: DISCONTINUED | OUTPATIENT
Start: 2017-08-25 | End: 2017-08-25 | Stop reason: HOSPADM

## 2017-08-25 RX ORDER — 0.9 % SODIUM CHLORIDE 0.9 %
2 VIAL (ML) INJECTION PRN
Status: DISCONTINUED | OUTPATIENT
Start: 2017-08-25 | End: 2017-08-25 | Stop reason: HOSPADM

## 2017-08-25 RX ORDER — HYDROMORPHONE HYDROCHLORIDE 1 MG/ML
.2-.4 INJECTION, SOLUTION INTRAMUSCULAR; INTRAVENOUS; SUBCUTANEOUS EVERY 5 MIN PRN
Status: DISCONTINUED | OUTPATIENT
Start: 2017-08-25 | End: 2017-08-25 | Stop reason: HOSPADM

## 2017-08-25 RX ORDER — DEXAMETHASONE SODIUM PHOSPHATE 4 MG/ML
4 INJECTION, SOLUTION INTRA-ARTICULAR; INTRALESIONAL; INTRAMUSCULAR; INTRAVENOUS; SOFT TISSUE
Status: DISCONTINUED | OUTPATIENT
Start: 2017-08-25 | End: 2017-08-25 | Stop reason: HOSPADM

## 2017-08-25 RX ORDER — LIDOCAINE HYDROCHLORIDE 10 MG/ML
5-10 INJECTION, SOLUTION INFILTRATION; PERINEURAL PRN
Status: DISCONTINUED | OUTPATIENT
Start: 2017-08-25 | End: 2017-08-25 | Stop reason: HOSPADM

## 2017-08-25 RX ORDER — LIDOCAINE AND PRILOCAINE 25; 25 MG/G; MG/G
1 CREAM TOPICAL PRN
Status: DISCONTINUED | OUTPATIENT
Start: 2017-08-25 | End: 2017-08-25 | Stop reason: HOSPADM

## 2017-08-25 RX ORDER — HUMAN INSULIN 100 [IU]/ML
INJECTION, SOLUTION SUBCUTANEOUS
Status: DISCONTINUED | OUTPATIENT
Start: 2017-08-25 | End: 2017-08-25 | Stop reason: HOSPADM

## 2017-08-25 RX ORDER — DEXTROSE MONOHYDRATE 25 G/50ML
25 INJECTION, SOLUTION INTRAVENOUS PRN
Status: DISCONTINUED | OUTPATIENT
Start: 2017-08-25 | End: 2017-08-25 | Stop reason: HOSPADM

## 2017-08-25 RX ORDER — AZITHROMYCIN 250 MG/1
TABLET, FILM COATED ORAL
Qty: 6 TABLET | Refills: 0 | Status: SHIPPED | OUTPATIENT
Start: 2017-08-25 | End: 2017-08-30

## 2017-08-25 RX ORDER — DEXTROSE MONOHYDRATE 50 MG/ML
INJECTION, SOLUTION INTRAVENOUS CONTINUOUS PRN
Status: DISCONTINUED | OUTPATIENT
Start: 2017-08-25 | End: 2017-08-25 | Stop reason: HOSPADM

## 2017-08-25 RX ORDER — ONDANSETRON 2 MG/ML
4 INJECTION INTRAMUSCULAR; INTRAVENOUS 2 TIMES DAILY PRN
Status: DISCONTINUED | OUTPATIENT
Start: 2017-08-25 | End: 2017-08-25 | Stop reason: HOSPADM

## 2017-08-25 RX ORDER — SODIUM CHLORIDE 9 MG/ML
INJECTION, SOLUTION INTRAVENOUS CONTINUOUS
Status: DISCONTINUED | OUTPATIENT
Start: 2017-08-25 | End: 2017-08-25 | Stop reason: HOSPADM

## 2017-08-25 RX ORDER — LIDOCAINE HYDROCHLORIDE 10 MG/ML
INJECTION, SOLUTION INFILTRATION; PERINEURAL PRN
Status: DISCONTINUED | OUTPATIENT
Start: 2017-08-25 | End: 2017-08-25

## 2017-08-25 RX ORDER — PROPOFOL 10 MG/ML
INJECTION, EMULSION INTRAVENOUS PRN
Status: DISCONTINUED | OUTPATIENT
Start: 2017-08-25 | End: 2017-08-25

## 2017-08-25 RX ADMIN — PROPOFOL 50 MG: 10 INJECTION, EMULSION INTRAVENOUS at 10:18

## 2017-08-25 RX ADMIN — PROPOFOL 50 MG: 10 INJECTION, EMULSION INTRAVENOUS at 10:15

## 2017-08-25 RX ADMIN — SODIUM CHLORIDE, POTASSIUM CHLORIDE, SODIUM LACTATE AND CALCIUM CHLORIDE: 600; 310; 30; 20 INJECTION, SOLUTION INTRAVENOUS at 09:21

## 2017-08-25 RX ADMIN — LIDOCAINE HYDROCHLORIDE 50 MG: 10 INJECTION, SOLUTION INFILTRATION; PERINEURAL at 09:44

## 2017-08-25 RX ADMIN — PROPOFOL 30 MG: 10 INJECTION, EMULSION INTRAVENOUS at 09:52

## 2017-08-25 RX ADMIN — PROPOFOL 20 MG: 10 INJECTION, EMULSION INTRAVENOUS at 09:49

## 2017-08-25 RX ADMIN — PROPOFOL 50 MG: 10 INJECTION, EMULSION INTRAVENOUS at 09:44

## 2017-08-25 RX ADMIN — PROPOFOL 125 MCG/KG/MIN: 10 INJECTION, EMULSION INTRAVENOUS at 09:44

## 2017-08-25 RX ADMIN — PROPOFOL 50 MG: 10 INJECTION, EMULSION INTRAVENOUS at 10:21

## 2017-08-25 RX ADMIN — PROPOFOL 50 MG: 10 INJECTION, EMULSION INTRAVENOUS at 10:25

## 2017-08-25 ASSESSMENT — PAIN SCALES - GENERAL
PAIN_LEVEL_AT_REST: 2
PAIN_LEVEL_AT_REST: 2

## 2017-08-25 ASSESSMENT — LIFESTYLE VARIABLES: SMOKING_HISTORY: NO

## 2017-08-28 ENCOUNTER — TELEPHONE (OUTPATIENT)
Dept: SURGERY | Age: 62
End: 2017-08-28

## 2017-08-28 ENCOUNTER — OFFICE VISIT (OUTPATIENT)
Dept: CHIROPRACTIC MEDICINE | Age: 62
End: 2017-08-28

## 2017-08-28 DIAGNOSIS — M54.50 ACUTE LEFT-SIDED LOW BACK PAIN WITHOUT SCIATICA: Primary | ICD-10-CM

## 2017-08-28 DIAGNOSIS — M99.01 SEGMENTAL AND SOMATIC DYSFUNCTION OF CERVICAL REGION: ICD-10-CM

## 2017-08-28 DIAGNOSIS — M54.2 CERVICALGIA: ICD-10-CM

## 2017-08-28 DIAGNOSIS — M99.05 SEGMENTAL AND SOMATIC DYSFUNCTION OF PELVIC REGION: ICD-10-CM

## 2017-08-28 DIAGNOSIS — M99.04 SEGMENTAL AND SOMATIC DYSFUNCTION OF SACRAL REGION: ICD-10-CM

## 2017-08-28 DIAGNOSIS — M99.03 SEGMENTAL AND SOMATIC DYSFUNCTION OF LUMBAR REGION: ICD-10-CM

## 2017-08-28 PROCEDURE — 98941 CHIROPRACT MANJ 3-4 REGIONS: CPT | Performed by: CHIROPRACTOR

## 2017-08-29 ENCOUNTER — E-ADVICE (OUTPATIENT)
Dept: INTERNAL MEDICINE | Age: 62
End: 2017-08-29

## 2017-08-29 ENCOUNTER — TELEPHONE (OUTPATIENT)
Dept: SURGERY | Age: 62
End: 2017-08-29

## 2017-08-29 LAB — PATHOLOGIST NAME: NORMAL

## 2017-08-30 ENCOUNTER — TELEPHONE (OUTPATIENT)
Dept: ALLERGY | Age: 62
End: 2017-08-30

## 2017-08-31 ENCOUNTER — TELEPHONE (OUTPATIENT)
Dept: SURGERY | Age: 62
End: 2017-08-31

## 2017-09-01 ENCOUNTER — TELEPHONE (OUTPATIENT)
Dept: SURGERY | Age: 62
End: 2017-09-01

## 2017-09-05 ENCOUNTER — PREP FOR CASE (OUTPATIENT)
Dept: SURGERY | Age: 62
End: 2017-09-05

## 2017-09-05 DIAGNOSIS — D80.1 HYPOGAMMAGLOBULINEMIA (CMD): Primary | ICD-10-CM

## 2017-09-06 ENCOUNTER — OFFICE VISIT (OUTPATIENT)
Dept: CHIROPRACTIC MEDICINE | Age: 62
End: 2017-09-06

## 2017-09-06 DIAGNOSIS — M54.42 ACUTE LEFT-SIDED LOW BACK PAIN WITH LEFT-SIDED SCIATICA: ICD-10-CM

## 2017-09-06 DIAGNOSIS — M54.2 CERVICALGIA: Primary | ICD-10-CM

## 2017-09-06 DIAGNOSIS — M99.01 SEGMENTAL AND SOMATIC DYSFUNCTION OF CERVICAL REGION: ICD-10-CM

## 2017-09-06 DIAGNOSIS — M99.04 SEGMENTAL AND SOMATIC DYSFUNCTION OF SACRAL REGION: ICD-10-CM

## 2017-09-06 DIAGNOSIS — M99.03 SEGMENTAL AND SOMATIC DYSFUNCTION OF LUMBAR REGION: ICD-10-CM

## 2017-09-06 DIAGNOSIS — M99.05 SEGMENTAL AND SOMATIC DYSFUNCTION OF PELVIC REGION: ICD-10-CM

## 2017-09-06 PROCEDURE — 98941 CHIROPRACT MANJ 3-4 REGIONS: CPT | Performed by: CHIROPRACTOR

## 2017-09-07 RX ORDER — CODEINE PHOSPHATE AND GUAIFENESIN 10; 100 MG/5ML; MG/5ML
5 SOLUTION ORAL 3 TIMES DAILY PRN
Qty: 240 ML | Refills: 0 | Status: SHIPPED | OUTPATIENT
Start: 2017-09-07 | End: 2017-10-12 | Stop reason: SDUPTHER

## 2017-09-08 ENCOUNTER — OFFICE VISIT (OUTPATIENT)
Dept: EDUCATION SERVICES | Age: 62
End: 2017-09-08
Attending: INTERNAL MEDICINE

## 2017-09-08 DIAGNOSIS — E08.9 DIABETES MELLITUS DUE TO UNDERLYING CONDITION WITHOUT COMPLICATION, WITHOUT LONG-TERM CURRENT USE OF INSULIN (CMD): Primary | ICD-10-CM

## 2017-09-08 PROCEDURE — G0108 DIAB MANAGE TRN  PER INDIV: HCPCS

## 2017-09-08 ASSESSMENT — ACTIVITIES OF DAILY LIVING (ADL)
ADL_SHORT_OF_BREATH: NO
RECENT_DECLINE_ADL: NO
ADL_BEFORE_ADMISSION: INDEPENDENT
ADL_SCORE: 12
SENSORY_SUPPORT_DEVICES: EYEGLASSES
NEEDS_ASSIST: NO

## 2017-09-08 ASSESSMENT — COGNITIVE AND FUNCTIONAL STATUS - GENERAL
ARE YOU DEAF OR DO YOU HAVE SERIOUS DIFFICULTY  HEARING: NO
ARE YOU BLIND OR DO YOU HAVE SERIOUS DIFFICULTY SEEING, EVEN WHEN WEARING GLASSES: NO

## 2017-09-11 ENCOUNTER — OFFICE VISIT (OUTPATIENT)
Dept: CHIROPRACTIC MEDICINE | Age: 62
End: 2017-09-11

## 2017-09-11 DIAGNOSIS — M99.03 SOMATIC DYSFUNCTION OF LUMBAR REGION: ICD-10-CM

## 2017-09-11 DIAGNOSIS — M99.04 SEGMENTAL AND SOMATIC DYSFUNCTION OF SACRAL REGION: ICD-10-CM

## 2017-09-11 DIAGNOSIS — M99.01 SEGMENTAL AND SOMATIC DYSFUNCTION OF CERVICAL REGION: ICD-10-CM

## 2017-09-11 DIAGNOSIS — M54.2 CERVICALGIA: ICD-10-CM

## 2017-09-11 DIAGNOSIS — M99.05 SEGMENTAL AND SOMATIC DYSFUNCTION OF PELVIC REGION: ICD-10-CM

## 2017-09-11 DIAGNOSIS — M54.50 ACUTE LEFT-SIDED LOW BACK PAIN WITHOUT SCIATICA: Primary | ICD-10-CM

## 2017-09-11 PROCEDURE — 98940 CHIROPRACT MANJ 1-2 REGIONS: CPT | Performed by: CHIROPRACTOR

## 2017-09-12 ENCOUNTER — ANESTHESIA EVENT (OUTPATIENT)
Dept: SURGERY | Age: 62
End: 2017-09-12

## 2017-09-13 ENCOUNTER — TELEPHONE (OUTPATIENT)
Dept: SURGERY | Age: 62
End: 2017-09-13

## 2017-09-13 ENCOUNTER — OFF PREMISE (OUTPATIENT)
Dept: SURGERY | Age: 62
End: 2017-09-13

## 2017-09-13 ENCOUNTER — HOSPITAL ENCOUNTER (OUTPATIENT)
Age: 62
Discharge: HOME | End: 2017-09-13
Attending: SURGERY | Admitting: SURGERY

## 2017-09-13 ENCOUNTER — ANESTHESIA (OUTPATIENT)
Dept: SURGERY | Age: 62
End: 2017-09-13

## 2017-09-13 ENCOUNTER — IMAGING SERVICES (OUTPATIENT)
Dept: GENERAL RADIOLOGY | Age: 62
End: 2017-09-13
Attending: NURSE PRACTITIONER

## 2017-09-13 ENCOUNTER — SURGERY (OUTPATIENT)
Age: 62
End: 2017-09-13

## 2017-09-13 ENCOUNTER — IMAGING SERVICES (OUTPATIENT)
Dept: GENERAL RADIOLOGY | Age: 62
End: 2017-09-13
Attending: SURGERY

## 2017-09-13 DIAGNOSIS — Z98.890 POSTOPERATIVE STATE: ICD-10-CM

## 2017-09-13 DIAGNOSIS — Z45.2 ENCOUNTER FOR INSERTION OF VENOUS ACCESS PORT: Primary | ICD-10-CM

## 2017-09-13 DIAGNOSIS — D80.1 HYPOGAMMAGLOBULINEMIA (CMD): ICD-10-CM

## 2017-09-13 DIAGNOSIS — D80.1 HYPOGAMMAGLOBULINEMIA (CMD): Primary | ICD-10-CM

## 2017-09-13 DIAGNOSIS — Z45.2 ENCOUNTER FOR INSERTION OF VENOUS ACCESS PORT: ICD-10-CM

## 2017-09-13 PROCEDURE — 36590 REMOVAL TUNNELED CV CATH: CPT | Performed by: SURGERY

## 2017-09-13 PROCEDURE — 36590 REMOVAL TUNNELED CV CATH: CPT | Performed by: CLINIC/CENTER

## 2017-09-13 PROCEDURE — 36561 INSERT TUNNELED CV CATH: CPT | Performed by: ANESTHESIOLOGY

## 2017-09-13 PROCEDURE — 77001 FLUOROGUIDE FOR VEIN DEVICE: CPT | Performed by: SURGERY

## 2017-09-13 PROCEDURE — 36561 INSERT TUNNELED CV CATH: CPT | Performed by: ANESTHESIOLOGIST ASSISTANT

## 2017-09-13 PROCEDURE — 71020 XR CHEST PA AND LATERAL: CPT | Performed by: RADIOLOGY

## 2017-09-13 PROCEDURE — 36561 INSERT TUNNELED CV CATH: CPT | Performed by: SURGERY

## 2017-09-13 PROCEDURE — L8699 PROSTHETIC IMPLANT NOS: HCPCS | Performed by: CLINIC/CENTER

## 2017-09-13 PROCEDURE — 36561 INSERT TUNNELED CV CATH: CPT | Performed by: CLINIC/CENTER

## 2017-09-13 DEVICE — MMIS - POWERPORT CLEARVUE ISP 8F CHRONOFLEX: Type: IMPLANTABLE DEVICE | Site: CHEST | Status: FUNCTIONAL

## 2017-09-13 RX ORDER — DEXTROSE MONOHYDRATE 25 G/50ML
25 INJECTION, SOLUTION INTRAVENOUS PRN
Status: DISCONTINUED | OUTPATIENT
Start: 2017-09-13 | End: 2017-09-15 | Stop reason: HOSPADM

## 2017-09-13 RX ORDER — NICOTINE POLACRILEX 4 MG
30 LOZENGE BUCCAL
Status: DISCONTINUED | OUTPATIENT
Start: 2017-09-13 | End: 2017-09-15 | Stop reason: HOSPADM

## 2017-09-13 RX ORDER — ONDANSETRON 2 MG/ML
4 INJECTION INTRAMUSCULAR; INTRAVENOUS 2 TIMES DAILY PRN
Status: DISCONTINUED | OUTPATIENT
Start: 2017-09-13 | End: 2017-09-15 | Stop reason: HOSPADM

## 2017-09-13 RX ORDER — SODIUM CHLORIDE 9 MG/ML
INJECTION, SOLUTION INTRAVENOUS CONTINUOUS
Status: DISCONTINUED | OUTPATIENT
Start: 2017-09-13 | End: 2017-09-15 | Stop reason: HOSPADM

## 2017-09-13 RX ORDER — LIDOCAINE HYDROCHLORIDE 10 MG/ML
5-10 INJECTION, SOLUTION INFILTRATION; PERINEURAL PRN
Status: DISCONTINUED | OUTPATIENT
Start: 2017-09-13 | End: 2017-09-15 | Stop reason: HOSPADM

## 2017-09-13 RX ORDER — PROPOFOL 10 MG/ML
INJECTION, EMULSION INTRAVENOUS PRN
Status: DISCONTINUED | OUTPATIENT
Start: 2017-09-13 | End: 2017-09-13

## 2017-09-13 RX ORDER — SODIUM CHLORIDE, SODIUM LACTATE, POTASSIUM CHLORIDE, CALCIUM CHLORIDE 600; 310; 30; 20 MG/100ML; MG/100ML; MG/100ML; MG/100ML
INJECTION, SOLUTION INTRAVENOUS CONTINUOUS
Status: DISCONTINUED | OUTPATIENT
Start: 2017-09-13 | End: 2017-09-15 | Stop reason: HOSPADM

## 2017-09-13 RX ORDER — 0.9 % SODIUM CHLORIDE 0.9 %
2 VIAL (ML) INJECTION PRN
Status: DISCONTINUED | OUTPATIENT
Start: 2017-09-13 | End: 2017-09-15 | Stop reason: HOSPADM

## 2017-09-13 RX ORDER — LIDOCAINE AND PRILOCAINE 25; 25 MG/G; MG/G
1 CREAM TOPICAL PRN
Status: DISCONTINUED | OUTPATIENT
Start: 2017-09-13 | End: 2017-09-15 | Stop reason: HOSPADM

## 2017-09-13 RX ORDER — HYDROCODONE BITARTRATE AND ACETAMINOPHEN 5; 325 MG/1; MG/1
1-2 TABLET ORAL EVERY 4 HOURS PRN
Status: DISCONTINUED | OUTPATIENT
Start: 2017-09-13 | End: 2017-09-15 | Stop reason: HOSPADM

## 2017-09-13 RX ORDER — 0.9 % SODIUM CHLORIDE 0.9 %
2 VIAL (ML) INJECTION EVERY 12 HOURS SCHEDULED
Status: DISCONTINUED | OUTPATIENT
Start: 2017-09-13 | End: 2017-09-15 | Stop reason: HOSPADM

## 2017-09-13 RX ORDER — HUMAN INSULIN 100 [IU]/ML
INJECTION, SOLUTION SUBCUTANEOUS
Status: DISCONTINUED | OUTPATIENT
Start: 2017-09-13 | End: 2017-09-15 | Stop reason: HOSPADM

## 2017-09-13 RX ORDER — DEXTROSE MONOHYDRATE 50 MG/ML
INJECTION, SOLUTION INTRAVENOUS CONTINUOUS PRN
Status: DISCONTINUED | OUTPATIENT
Start: 2017-09-13 | End: 2017-09-15 | Stop reason: HOSPADM

## 2017-09-13 RX ORDER — SODIUM CHLORIDE, SODIUM LACTATE, POTASSIUM CHLORIDE, CALCIUM CHLORIDE 600; 310; 30; 20 MG/100ML; MG/100ML; MG/100ML; MG/100ML
INJECTION, SOLUTION INTRAVENOUS CONTINUOUS PRN
Status: DISCONTINUED | OUTPATIENT
Start: 2017-09-13 | End: 2017-09-13

## 2017-09-13 RX ORDER — ACETAMINOPHEN 325 MG/1
650 TABLET ORAL
Status: DISCONTINUED | OUTPATIENT
Start: 2017-09-13 | End: 2017-09-15 | Stop reason: HOSPADM

## 2017-09-13 RX ORDER — LIDOCAINE HYDROCHLORIDE 10 MG/ML
INJECTION, SOLUTION INFILTRATION; PERINEURAL PRN
Status: DISCONTINUED | OUTPATIENT
Start: 2017-09-13 | End: 2017-09-15 | Stop reason: HOSPADM

## 2017-09-13 RX ORDER — FAMOTIDINE 20 MG/1
20 TABLET, FILM COATED ORAL
Status: DISCONTINUED | OUTPATIENT
Start: 2017-09-13 | End: 2017-09-15 | Stop reason: HOSPADM

## 2017-09-13 RX ADMIN — LIDOCAINE HYDROCHLORIDE 2 ML: 10 INJECTION, SOLUTION INFILTRATION; PERINEURAL at 11:29

## 2017-09-13 RX ADMIN — PROPOFOL 30 MG: 10 INJECTION, EMULSION INTRAVENOUS at 11:01

## 2017-09-13 RX ADMIN — SODIUM CHLORIDE, SODIUM LACTATE, POTASSIUM CHLORIDE, CALCIUM CHLORIDE: 600; 310; 30; 20 INJECTION, SOLUTION INTRAVENOUS at 10:37

## 2017-09-13 RX ADMIN — PROPOFOL 30 MG: 10 INJECTION, EMULSION INTRAVENOUS at 11:04

## 2017-09-13 RX ADMIN — SODIUM CHLORIDE, SODIUM LACTATE, POTASSIUM CHLORIDE, CALCIUM CHLORIDE: 600; 310; 30; 20 INJECTION, SOLUTION INTRAVENOUS at 10:31

## 2017-09-13 ASSESSMENT — ENCOUNTER SYMPTOMS
EXERCISE TOLERANCE: GOOD
SEIZURES: 0

## 2017-09-13 ASSESSMENT — PAIN SCALES - GENERAL
PAIN_LEVEL_AT_REST: 2

## 2017-09-14 VITALS
OXYGEN SATURATION: 96 % | RESPIRATION RATE: 18 BRPM | BODY MASS INDEX: 46.6 KG/M2 | SYSTOLIC BLOOD PRESSURE: 175 MMHG | HEART RATE: 92 BPM | WEIGHT: 279.7 LBS | HEIGHT: 65 IN | DIASTOLIC BLOOD PRESSURE: 85 MMHG | TEMPERATURE: 97.1 F

## 2017-09-18 ENCOUNTER — OFFICE VISIT (OUTPATIENT)
Dept: CHIROPRACTIC MEDICINE | Age: 62
End: 2017-09-18

## 2017-09-18 DIAGNOSIS — M54.42 ACUTE LEFT-SIDED LOW BACK PAIN WITH LEFT-SIDED SCIATICA: Primary | ICD-10-CM

## 2017-09-18 DIAGNOSIS — M99.03 SEGMENTAL AND SOMATIC DYSFUNCTION OF LUMBAR REGION: ICD-10-CM

## 2017-09-18 DIAGNOSIS — M99.04 SEGMENTAL AND SOMATIC DYSFUNCTION OF SACRAL REGION: ICD-10-CM

## 2017-09-18 DIAGNOSIS — M99.05 SEGMENTAL AND SOMATIC DYSFUNCTION OF PELVIC REGION: ICD-10-CM

## 2017-09-18 PROCEDURE — 98940 CHIROPRACT MANJ 1-2 REGIONS: CPT | Performed by: CHIROPRACTOR

## 2017-09-19 ENCOUNTER — TELEPHONE (OUTPATIENT)
Dept: INTERNAL MEDICINE | Age: 62
End: 2017-09-19

## 2017-09-19 ENCOUNTER — OFFICE VISIT (OUTPATIENT)
Dept: SURGERY | Age: 62
End: 2017-09-19

## 2017-09-19 DIAGNOSIS — Z09 FOLLOW-UP EXAMINATION FOLLOWING SURGERY: Primary | ICD-10-CM

## 2017-09-19 DIAGNOSIS — D80.1 HYPOGAMMAGLOBULINEMIA (CMD): ICD-10-CM

## 2017-09-19 DIAGNOSIS — E11.9 TYPE 2 DIABETES MELLITUS WITHOUT COMPLICATION, WITHOUT LONG-TERM CURRENT USE OF INSULIN (CMD): Primary | ICD-10-CM

## 2017-09-19 PROCEDURE — 99024 POSTOP FOLLOW-UP VISIT: CPT | Performed by: SURGERY

## 2017-09-20 ENCOUNTER — OFFICE VISIT (OUTPATIENT)
Dept: NEPHROLOGY | Age: 62
End: 2017-09-20

## 2017-09-20 VITALS
SYSTOLIC BLOOD PRESSURE: 148 MMHG | DIASTOLIC BLOOD PRESSURE: 86 MMHG | WEIGHT: 274 LBS | BODY MASS INDEX: 45.65 KG/M2 | HEART RATE: 84 BPM | HEIGHT: 65 IN

## 2017-09-20 DIAGNOSIS — I10 ESSENTIAL HYPERTENSION WITH GOAL BLOOD PRESSURE LESS THAN 140/90: ICD-10-CM

## 2017-09-20 DIAGNOSIS — N18.30 CKD (CHRONIC KIDNEY DISEASE), STAGE III (CMD): ICD-10-CM

## 2017-09-20 DIAGNOSIS — N20.0 CALCULUS OF KIDNEY: Primary | ICD-10-CM

## 2017-09-20 PROCEDURE — 99214 OFFICE O/P EST MOD 30 MIN: CPT | Performed by: INTERNAL MEDICINE

## 2017-09-21 ENCOUNTER — HOSPITAL ENCOUNTER (OUTPATIENT)
Dept: INFUSION THERAPY | Age: 62
Discharge: STILL ADMITTED OR ADMINISTRATIVE | End: 2017-09-21
Attending: INTERNAL MEDICINE

## 2017-09-21 DIAGNOSIS — E11.9 TYPE 2 DIABETES MELLITUS WITHOUT COMPLICATION, WITHOUT LONG-TERM CURRENT USE OF INSULIN (CMD): ICD-10-CM

## 2017-09-21 DIAGNOSIS — D80.1 HYPOGAMMAGLOBULINEMIA (CMD): ICD-10-CM

## 2017-09-21 PROCEDURE — 82784 ASSAY IGA/IGD/IGG/IGM EACH: CPT

## 2017-09-21 PROCEDURE — 10002800 HB RX 250 W HCPCS: Performed by: INTERNAL MEDICINE

## 2017-09-21 PROCEDURE — 83036 HEMOGLOBIN GLYCOSYLATED A1C: CPT

## 2017-09-21 PROCEDURE — 36592 COLLECT BLOOD FROM PICC: CPT

## 2017-09-21 RX ADMIN — SODIUM CHLORIDE, PRESERVATIVE FREE 500 UNITS: 5 INJECTION INTRAVENOUS at 11:10

## 2017-09-22 LAB
HBA1C MFR BLD: 6.9 % (ref 4.5–5.6)
IGA SERPL-MCNC: 132 MG/DL (ref 82–453)
IGG SERPL-MCNC: 688 MG/DL (ref 751–1560)
IGM SERPL-MCNC: 26 MG/DL (ref 46–304)

## 2017-09-25 ENCOUNTER — TELEPHONE (OUTPATIENT)
Dept: INTERNAL MEDICINE | Age: 62
End: 2017-09-25

## 2017-09-25 ENCOUNTER — E-ADVICE (OUTPATIENT)
Dept: INTERNAL MEDICINE | Age: 62
End: 2017-09-25

## 2017-09-27 ENCOUNTER — E-ADVICE (OUTPATIENT)
Dept: INTERNAL MEDICINE | Age: 62
End: 2017-09-27

## 2017-09-27 RX ORDER — DOXYCYCLINE HYCLATE 100 MG
100 TABLET ORAL 2 TIMES DAILY
Qty: 20 TABLET | Refills: 0 | Status: SHIPPED | OUTPATIENT
Start: 2017-09-27 | End: 2017-10-08

## 2017-10-04 ENCOUNTER — E-ADVICE (OUTPATIENT)
Dept: INTERNAL MEDICINE | Age: 62
End: 2017-10-04

## 2017-10-04 RX ORDER — TRAMADOL HYDROCHLORIDE 50 MG/1
50 TABLET ORAL EVERY 6 HOURS PRN
Qty: 50 TABLET | Refills: 3 | Status: SHIPPED | OUTPATIENT
Start: 2017-10-04 | End: 2018-02-09 | Stop reason: SDUPTHER

## 2017-10-04 RX ORDER — PREDNISONE 20 MG/1
20 TABLET ORAL DAILY
Qty: 20 TABLET | Refills: 0 | Status: SHIPPED | OUTPATIENT
Start: 2017-10-04 | End: 2017-10-12 | Stop reason: SDUPTHER

## 2017-10-05 ENCOUNTER — OFFICE VISIT (OUTPATIENT)
Dept: INTERNAL MEDICINE | Age: 62
End: 2017-10-05

## 2017-10-05 ENCOUNTER — E-ADVICE (OUTPATIENT)
Dept: INTERNAL MEDICINE | Age: 62
End: 2017-10-05

## 2017-10-05 VITALS
SYSTOLIC BLOOD PRESSURE: 122 MMHG | RESPIRATION RATE: 20 BRPM | HEART RATE: 92 BPM | WEIGHT: 280.4 LBS | TEMPERATURE: 97.8 F | DIASTOLIC BLOOD PRESSURE: 80 MMHG | BODY MASS INDEX: 46.66 KG/M2

## 2017-10-05 DIAGNOSIS — J98.01 ACUTE BRONCHOSPASM: ICD-10-CM

## 2017-10-05 DIAGNOSIS — J45.909 ACUTE ASTHMATIC BRONCHITIS: Primary | ICD-10-CM

## 2017-10-05 DIAGNOSIS — D80.1 HYPOGAMMAGLOBULINEMIA (CMD): ICD-10-CM

## 2017-10-05 PROCEDURE — 99213 OFFICE O/P EST LOW 20 MIN: CPT | Performed by: INTERNAL MEDICINE

## 2017-10-05 RX ORDER — FLUCONAZOLE 100 MG/1
100 TABLET ORAL 2 TIMES DAILY
Qty: 14 TABLET | Refills: 0 | Status: SHIPPED | OUTPATIENT
Start: 2017-10-05 | End: 2017-11-22

## 2017-10-05 RX ORDER — LEVOFLOXACIN 500 MG/1
500 TABLET, FILM COATED ORAL DAILY
Qty: 7 TABLET | Refills: 0 | Status: SHIPPED | OUTPATIENT
Start: 2017-10-05 | End: 2017-10-16 | Stop reason: ALTCHOICE

## 2017-10-09 ENCOUNTER — TELEPHONE (OUTPATIENT)
Dept: ALLERGY | Age: 62
End: 2017-10-09

## 2017-10-09 ENCOUNTER — TELEPHONE (OUTPATIENT)
Dept: INTERNAL MEDICINE | Age: 62
End: 2017-10-09

## 2017-10-09 DIAGNOSIS — D80.1 HYPOGAMMAGLOBULINEMIA (CMD): Primary | ICD-10-CM

## 2017-10-09 RX ORDER — BENZONATATE 100 MG/1
100-200 CAPSULE ORAL 3 TIMES DAILY PRN
Qty: 60 CAPSULE | Refills: 0 | Status: SHIPPED | OUTPATIENT
Start: 2017-10-09 | End: 2017-10-22 | Stop reason: SDUPTHER

## 2017-10-10 ENCOUNTER — HOSPITAL ENCOUNTER (OUTPATIENT)
Dept: INFUSION THERAPY | Age: 62
Discharge: STILL ADMITTED OR ADMINISTRATIVE | End: 2017-10-10
Attending: INTERNAL MEDICINE

## 2017-10-10 DIAGNOSIS — D80.1 HYPOGAMMAGLOBULINEMIA (CMD): ICD-10-CM

## 2017-10-10 PROCEDURE — 10002800 HB RX 250 W HCPCS: Performed by: INTERNAL MEDICINE

## 2017-10-10 PROCEDURE — 36592 COLLECT BLOOD FROM PICC: CPT

## 2017-10-10 PROCEDURE — 82784 ASSAY IGA/IGD/IGG/IGM EACH: CPT

## 2017-10-10 RX ADMIN — SODIUM CHLORIDE, PRESERVATIVE FREE 500 UNITS: 5 INJECTION INTRAVENOUS at 11:06

## 2017-10-11 ENCOUNTER — TELEPHONE (OUTPATIENT)
Dept: INTERNAL MEDICINE | Age: 62
End: 2017-10-11

## 2017-10-11 LAB
IGA SERPL-MCNC: 137 MG/DL (ref 82–453)
IGG SERPL-MCNC: 759 MG/DL (ref 751–1560)
IGM SERPL-MCNC: 29 MG/DL (ref 46–304)

## 2017-10-12 ENCOUNTER — TELEPHONE (OUTPATIENT)
Dept: INTERNAL MEDICINE | Age: 62
End: 2017-10-12

## 2017-10-12 RX ORDER — CODEINE PHOSPHATE AND GUAIFENESIN 10; 100 MG/5ML; MG/5ML
5 SOLUTION ORAL 3 TIMES DAILY PRN
Qty: 240 ML | Refills: 0 | Status: SHIPPED | OUTPATIENT
Start: 2017-10-12 | End: 2017-11-09 | Stop reason: SDUPTHER

## 2017-10-12 RX ORDER — PREDNISONE 20 MG/1
20 TABLET ORAL DAILY
Qty: 20 TABLET | Refills: 0 | Status: SHIPPED | OUTPATIENT
Start: 2017-10-12 | End: 2017-10-16 | Stop reason: SDUPTHER

## 2017-10-16 ENCOUNTER — TELEPHONE (OUTPATIENT)
Dept: RHEUMATOLOGY | Age: 62
End: 2017-10-16

## 2017-10-16 ENCOUNTER — OFFICE VISIT (OUTPATIENT)
Dept: INTERNAL MEDICINE | Age: 62
End: 2017-10-16

## 2017-10-16 VITALS
TEMPERATURE: 98.8 F | SYSTOLIC BLOOD PRESSURE: 110 MMHG | WEIGHT: 280 LBS | DIASTOLIC BLOOD PRESSURE: 70 MMHG | HEART RATE: 88 BPM | BODY MASS INDEX: 46.59 KG/M2

## 2017-10-16 DIAGNOSIS — J98.01 ACUTE BRONCHOSPASM: ICD-10-CM

## 2017-10-16 DIAGNOSIS — D80.1 HYPOGAMMAGLOBULINEMIA (CMD): ICD-10-CM

## 2017-10-16 DIAGNOSIS — J45.909 INTRINSIC ASTHMA: Primary | ICD-10-CM

## 2017-10-16 PROCEDURE — 99214 OFFICE O/P EST MOD 30 MIN: CPT | Performed by: INTERNAL MEDICINE

## 2017-10-16 RX ORDER — PRAZOSIN HYDROCHLORIDE 2 MG/1
2 CAPSULE ORAL 3 TIMES DAILY
Qty: 270 CAPSULE | Refills: 1 | Status: SHIPPED | OUTPATIENT
Start: 2017-10-16 | End: 2018-01-12 | Stop reason: SDUPTHER

## 2017-10-16 RX ORDER — PREDNISONE 20 MG/1
TABLET ORAL
Qty: 20 TABLET | Refills: 0 | Status: SHIPPED | COMMUNITY
Start: 2017-10-16 | End: 2017-10-16 | Stop reason: SDUPTHER

## 2017-10-16 RX ORDER — PRAZOSIN HYDROCHLORIDE 2 MG/1
2 CAPSULE ORAL 3 TIMES DAILY
Qty: 270 CAPSULE | Refills: 2 | Status: CANCELLED | OUTPATIENT
Start: 2017-10-16 | End: 2018-10-11

## 2017-10-16 RX ORDER — PREDNISONE 20 MG/1
TABLET ORAL
Qty: 540 TABLET | Refills: 0 | Status: SHIPPED | OUTPATIENT
Start: 2017-10-16 | End: 2018-01-16 | Stop reason: ALTCHOICE

## 2017-10-16 RX ORDER — IPRATROPIUM BROMIDE AND ALBUTEROL SULFATE 2.5; .5 MG/3ML; MG/3ML
3 SOLUTION RESPIRATORY (INHALATION) EVERY 4 HOURS PRN
Qty: 900 ML | Refills: 3 | Status: SHIPPED | OUTPATIENT
Start: 2017-10-16 | End: 2017-10-23 | Stop reason: SDUPTHER

## 2017-10-16 RX ORDER — IPRATROPIUM BROMIDE AND ALBUTEROL SULFATE 2.5; .5 MG/3ML; MG/3ML
SOLUTION RESPIRATORY (INHALATION)
Qty: 900 ML | Refills: 1 | Status: SHIPPED | OUTPATIENT
Start: 2017-10-16 | End: 2017-10-16 | Stop reason: SDUPTHER

## 2017-10-16 RX ORDER — TIZANIDINE 2 MG/1
TABLET ORAL
Qty: 200 TABLET | Refills: 1 | Status: SHIPPED | OUTPATIENT
Start: 2017-10-16 | End: 2018-02-10 | Stop reason: SDUPTHER

## 2017-10-16 RX ORDER — AMLODIPINE BESYLATE 5 MG/1
5 TABLET ORAL DAILY
Qty: 90 TABLET | Refills: 3 | Status: SHIPPED | COMMUNITY
Start: 2017-10-16 | End: 2017-10-16 | Stop reason: SDUPTHER

## 2017-10-16 RX ORDER — CLARITHROMYCIN 500 MG/1
500 TABLET, COATED ORAL 2 TIMES DAILY
Qty: 28 TABLET | Refills: 0 | Status: SHIPPED | OUTPATIENT
Start: 2017-10-16 | End: 2017-10-30

## 2017-10-16 RX ORDER — AMLODIPINE BESYLATE 5 MG/1
5 TABLET ORAL DAILY
Qty: 90 TABLET | Refills: 3 | Status: SHIPPED | OUTPATIENT
Start: 2017-10-16 | End: 2018-03-15

## 2017-10-17 ENCOUNTER — OFFICE VISIT (OUTPATIENT)
Dept: RHEUMATOLOGY | Age: 62
End: 2017-10-17

## 2017-10-17 VITALS
WEIGHT: 280.2 LBS | RESPIRATION RATE: 16 BRPM | SYSTOLIC BLOOD PRESSURE: 136 MMHG | HEART RATE: 96 BPM | BODY MASS INDEX: 46.63 KG/M2 | DIASTOLIC BLOOD PRESSURE: 58 MMHG

## 2017-10-17 DIAGNOSIS — M19.011 PRIMARY OSTEOARTHRITIS OF BOTH SHOULDERS: Primary | ICD-10-CM

## 2017-10-17 DIAGNOSIS — M19.012 PRIMARY OSTEOARTHRITIS OF BOTH SHOULDERS: Primary | ICD-10-CM

## 2017-10-17 PROCEDURE — 20610 DRAIN/INJ JOINT/BURSA W/O US: CPT | Performed by: INTERNAL MEDICINE

## 2017-10-17 PROCEDURE — 99214 OFFICE O/P EST MOD 30 MIN: CPT | Performed by: INTERNAL MEDICINE

## 2017-10-17 RX ORDER — TRIAMCINOLONE ACETONIDE 40 MG/ML
40 INJECTION, SUSPENSION INTRA-ARTICULAR; INTRAMUSCULAR ONCE
Status: COMPLETED | OUTPATIENT
Start: 2017-10-17 | End: 2017-10-17

## 2017-10-17 RX ORDER — ACETAMINOPHEN AND CODEINE PHOSPHATE 300; 30 MG/1; MG/1
1-2 TABLET ORAL PRN
Qty: 180 TABLET | Refills: 1 | Status: SHIPPED | OUTPATIENT
Start: 2017-10-17 | End: 2017-11-16 | Stop reason: SDUPTHER

## 2017-10-17 RX ADMIN — TRIAMCINOLONE ACETONIDE 40 MG: 40 INJECTION, SUSPENSION INTRA-ARTICULAR; INTRAMUSCULAR at 14:39

## 2017-10-17 RX ADMIN — TRIAMCINOLONE ACETONIDE 40 MG: 40 INJECTION, SUSPENSION INTRA-ARTICULAR; INTRAMUSCULAR at 14:38

## 2017-10-18 ENCOUNTER — TELEPHONE (OUTPATIENT)
Dept: ALLERGY | Age: 62
End: 2017-10-18

## 2017-10-18 ENCOUNTER — OFFICE VISIT (OUTPATIENT)
Dept: ALLERGY | Age: 62
End: 2017-10-18

## 2017-10-18 VITALS
DIASTOLIC BLOOD PRESSURE: 80 MMHG | WEIGHT: 280.2 LBS | HEART RATE: 103 BPM | HEIGHT: 65 IN | RESPIRATION RATE: 20 BRPM | SYSTOLIC BLOOD PRESSURE: 130 MMHG | OXYGEN SATURATION: 96 % | TEMPERATURE: 97.4 F | BODY MASS INDEX: 46.69 KG/M2

## 2017-10-18 DIAGNOSIS — D83.9 CVID (COMMON VARIABLE IMMUNODEFICIENCY) (CMD): Primary | ICD-10-CM

## 2017-10-18 DIAGNOSIS — D80.1 HYPOGAMMAGLOBULINEMIA (CMD): Primary | ICD-10-CM

## 2017-10-18 DIAGNOSIS — Z23 NEED FOR VACCINATION: ICD-10-CM

## 2017-10-18 PROCEDURE — 99204 OFFICE O/P NEW MOD 45 MIN: CPT | Performed by: ALLERGY & IMMUNOLOGY

## 2017-10-18 PROCEDURE — 90471 IMMUNIZATION ADMIN: CPT | Performed by: ALLERGY & IMMUNOLOGY

## 2017-10-18 PROCEDURE — 90686 IIV4 VACC NO PRSV 0.5 ML IM: CPT | Performed by: ALLERGY & IMMUNOLOGY

## 2017-10-19 ENCOUNTER — OFFICE VISIT (OUTPATIENT)
Dept: PULMONOLOGY | Age: 62
End: 2017-10-19
Attending: INTERNAL MEDICINE

## 2017-10-19 VITALS
DIASTOLIC BLOOD PRESSURE: 82 MMHG | BODY MASS INDEX: 46.65 KG/M2 | HEART RATE: 100 BPM | WEIGHT: 280 LBS | HEIGHT: 65 IN | OXYGEN SATURATION: 97 % | SYSTOLIC BLOOD PRESSURE: 124 MMHG

## 2017-10-19 DIAGNOSIS — D83.9 CVID (COMMON VARIABLE IMMUNODEFICIENCY) (CMD): ICD-10-CM

## 2017-10-19 DIAGNOSIS — J45.40 MODERATE PERSISTENT ASTHMATIC BRONCHITIS WITHOUT COMPLICATION: Primary | ICD-10-CM

## 2017-10-19 DIAGNOSIS — J39.8 TRACHEOBRONCHOMALACIA: ICD-10-CM

## 2017-10-19 PROCEDURE — 99205 OFFICE O/P NEW HI 60 MIN: CPT | Performed by: INTERNAL MEDICINE

## 2017-10-19 RX ORDER — FLUTICASONE PROPIONATE AND SALMETEROL 500; 50 UG/1; UG/1
1 POWDER RESPIRATORY (INHALATION) 2 TIMES DAILY
Qty: 60 EACH | Refills: 11 | Status: SHIPPED | OUTPATIENT
Start: 2017-10-19 | End: 2017-10-24 | Stop reason: SDUPTHER

## 2017-10-20 ENCOUNTER — E-ADVICE (OUTPATIENT)
Dept: PULMONOLOGY | Age: 62
End: 2017-10-20

## 2017-10-20 ENCOUNTER — LAB SERVICES (OUTPATIENT)
Dept: LAB | Age: 62
End: 2017-10-20

## 2017-10-20 DIAGNOSIS — J45.40 MODERATE PERSISTENT ASTHMATIC BRONCHITIS WITHOUT COMPLICATION: ICD-10-CM

## 2017-10-20 DIAGNOSIS — D83.9 CVID (COMMON VARIABLE IMMUNODEFICIENCY) (CMD): ICD-10-CM

## 2017-10-20 PROCEDURE — 82785 ASSAY OF IGE: CPT | Performed by: INTERNAL MEDICINE

## 2017-10-20 PROCEDURE — 36415 COLL VENOUS BLD VENIPUNCTURE: CPT | Performed by: INTERNAL MEDICINE

## 2017-10-21 LAB — IGE SERPL-ACNC: 83.5 IUNITS/ML

## 2017-10-21 RX ORDER — IPRATROPIUM BROMIDE AND ALBUTEROL SULFATE 2.5; .5 MG/3ML; MG/3ML
3 SOLUTION RESPIRATORY (INHALATION) EVERY 4 HOURS PRN
Qty: 900 ML | Refills: 3 | Status: CANCELLED | OUTPATIENT
Start: 2017-10-21

## 2017-10-23 ENCOUNTER — TELEPHONE (OUTPATIENT)
Dept: ALLERGY | Age: 62
End: 2017-10-23

## 2017-10-23 RX ORDER — BENZONATATE 100 MG/1
100-200 CAPSULE ORAL 3 TIMES DAILY PRN
Qty: 60 CAPSULE | Refills: 0 | Status: SHIPPED | OUTPATIENT
Start: 2017-10-23 | End: 2017-11-09 | Stop reason: SDUPTHER

## 2017-10-23 RX ORDER — IPRATROPIUM BROMIDE AND ALBUTEROL SULFATE 2.5; .5 MG/3ML; MG/3ML
3 SOLUTION RESPIRATORY (INHALATION) EVERY 4 HOURS PRN
Qty: 900 ML | Refills: 0 | Status: ON HOLD | OUTPATIENT
Start: 2017-10-23 | End: 2018-12-09 | Stop reason: ALTCHOICE

## 2017-10-23 RX ORDER — IPRATROPIUM BROMIDE AND ALBUTEROL SULFATE 2.5; .5 MG/3ML; MG/3ML
3 SOLUTION RESPIRATORY (INHALATION) EVERY 4 HOURS PRN
Qty: 900 ML | Refills: 3 | Status: CANCELLED | OUTPATIENT
Start: 2017-10-23

## 2017-10-23 RX ORDER — IPRATROPIUM BROMIDE AND ALBUTEROL SULFATE 2.5; .5 MG/3ML; MG/3ML
3 SOLUTION RESPIRATORY (INHALATION) EVERY 4 HOURS PRN
Qty: 900 ML | Refills: 3 | Status: SHIPPED | OUTPATIENT
Start: 2017-10-23 | End: 2017-10-23

## 2017-10-24 ENCOUNTER — E-ADVICE (OUTPATIENT)
Dept: PULMONOLOGY | Age: 62
End: 2017-10-24

## 2017-10-24 RX ORDER — FLUTICASONE PROPIONATE AND SALMETEROL 500; 50 UG/1; UG/1
1 POWDER RESPIRATORY (INHALATION) 2 TIMES DAILY
Qty: 60 EACH | Refills: 11 | Status: SHIPPED | OUTPATIENT
Start: 2017-10-24 | End: 2017-11-30 | Stop reason: SDUPTHER

## 2017-10-26 RX ORDER — CLONAZEPAM 2 MG/1
2 TABLET ORAL NIGHTLY PRN
Qty: 90 TABLET | Refills: 0 | Status: SHIPPED
Start: 2017-10-26 | End: 2018-01-11 | Stop reason: SDUPTHER

## 2017-10-30 ENCOUNTER — TELEPHONE (OUTPATIENT)
Dept: ALLERGY | Age: 62
End: 2017-10-30

## 2017-10-31 ENCOUNTER — CASE MANAGEMENT (OUTPATIENT)
Dept: ALLERGY | Age: 62
End: 2017-10-31

## 2017-10-31 ENCOUNTER — CLINICAL ABSTRACT (OUTPATIENT)
Dept: REHABILITATION | Age: 62
End: 2017-10-31

## 2017-10-31 ENCOUNTER — TELEPHONE (OUTPATIENT)
Dept: ALLERGY | Age: 62
End: 2017-10-31

## 2017-10-31 RX ORDER — ATORVASTATIN CALCIUM 10 MG/1
10 TABLET, FILM COATED ORAL DAILY
Qty: 90 TABLET | Refills: 0 | Status: SHIPPED | OUTPATIENT
Start: 2017-10-31 | End: 2018-02-10 | Stop reason: SDUPTHER

## 2017-11-01 ENCOUNTER — OFFICE VISIT (OUTPATIENT)
Dept: HEMATOLOGY/ONCOLOGY | Age: 62
End: 2017-11-01
Attending: ALLERGY & IMMUNOLOGY

## 2017-11-01 ENCOUNTER — TELEPHONE (OUTPATIENT)
Dept: PULMONOLOGY | Age: 62
End: 2017-11-01

## 2017-11-01 ENCOUNTER — TELEPHONE (OUTPATIENT)
Dept: ALLERGY | Age: 62
End: 2017-11-01

## 2017-11-01 ENCOUNTER — LAB SERVICES (OUTPATIENT)
Dept: HEMATOLOGY/ONCOLOGY | Age: 62
End: 2017-11-01
Attending: ALLERGY & IMMUNOLOGY

## 2017-11-01 DIAGNOSIS — J45.909 INTRINSIC ASTHMA: Primary | ICD-10-CM

## 2017-11-01 DIAGNOSIS — D80.1 HYPOGAMMAGLOBULINEMIA (CMD): ICD-10-CM

## 2017-11-01 DIAGNOSIS — D80.1 HYPOGAMMAGLOBULINEMIA (CMD): Primary | ICD-10-CM

## 2017-11-01 LAB
BUN SERPL-MCNC: 29 MG/DL (ref 6–20)
CREAT SERPL-MCNC: 1.64 MG/DL (ref 0.51–0.95)

## 2017-11-01 PROCEDURE — 84520 ASSAY OF UREA NITROGEN: CPT

## 2017-11-01 PROCEDURE — 82565 ASSAY OF CREATININE: CPT

## 2017-11-01 PROCEDURE — 96366 THER/PROPH/DIAG IV INF ADDON: CPT | Performed by: ALLERGY & IMMUNOLOGY

## 2017-11-01 PROCEDURE — 10002800 HB RX 250 W HCPCS: Performed by: INTERNAL MEDICINE

## 2017-11-01 PROCEDURE — 10002807 HB RX 258: Performed by: ALLERGY & IMMUNOLOGY

## 2017-11-01 PROCEDURE — 10002803 HB RX 637: Performed by: ALLERGY & IMMUNOLOGY

## 2017-11-01 PROCEDURE — 96365 THER/PROPH/DIAG IV INF INIT: CPT | Performed by: ALLERGY & IMMUNOLOGY

## 2017-11-01 RX ORDER — DIPHENHYDRAMINE HCL 25 MG
25 CAPSULE ORAL ONCE
Status: COMPLETED | OUTPATIENT
Start: 2017-11-01 | End: 2017-11-01

## 2017-11-01 RX ADMIN — DIPHENHYDRAMINE HYDROCHLORIDE 25 MG: 25 CAPSULE ORAL at 10:05

## 2017-11-01 RX ADMIN — SODIUM CHLORIDE, PRESERVATIVE FREE 500 UNITS: 5 INJECTION INTRAVENOUS at 16:10

## 2017-11-01 RX ADMIN — SODIUM CHLORIDE 250 ML: 9 INJECTION, SOLUTION INTRAVENOUS at 09:10

## 2017-11-01 ASSESSMENT — PAIN SCALES - GENERAL: PAINLEVEL: 0

## 2017-11-03 ENCOUNTER — TELEPHONE (OUTPATIENT)
Dept: INTERNAL MEDICINE | Age: 62
End: 2017-11-03

## 2017-11-03 ENCOUNTER — APPOINTMENT (OUTPATIENT)
Dept: REHABILITATION | Age: 62
End: 2017-11-03
Attending: INTERNAL MEDICINE

## 2017-11-09 RX ORDER — BENZONATATE 100 MG/1
100-200 CAPSULE ORAL 3 TIMES DAILY PRN
Qty: 60 CAPSULE | Refills: 0 | Status: SHIPPED | OUTPATIENT
Start: 2017-11-09 | End: 2017-11-20

## 2017-11-09 RX ORDER — CODEINE PHOSPHATE AND GUAIFENESIN 10; 100 MG/5ML; MG/5ML
5 SOLUTION ORAL 3 TIMES DAILY PRN
Qty: 240 ML | Refills: 0 | Status: SHIPPED | OUTPATIENT
Start: 2017-11-09 | End: 2017-11-30 | Stop reason: SDUPTHER

## 2017-11-10 ENCOUNTER — E-ADVICE (OUTPATIENT)
Dept: PULMONOLOGY | Age: 62
End: 2017-11-10

## 2017-11-14 ENCOUNTER — OFFICE VISIT (OUTPATIENT)
Dept: ORTHOPEDICS | Age: 62
End: 2017-11-14

## 2017-11-14 VITALS
WEIGHT: 275.57 LBS | HEIGHT: 65 IN | BODY MASS INDEX: 45.91 KG/M2 | DIASTOLIC BLOOD PRESSURE: 80 MMHG | SYSTOLIC BLOOD PRESSURE: 142 MMHG

## 2017-11-14 DIAGNOSIS — M75.102 ROTATOR CUFF SYNDROME OF LEFT SHOULDER: Primary | ICD-10-CM

## 2017-11-14 PROCEDURE — 99203 OFFICE O/P NEW LOW 30 MIN: CPT | Performed by: ORTHOPAEDIC SURGERY

## 2017-11-14 PROCEDURE — 20610 DRAIN/INJ JOINT/BURSA W/O US: CPT | Performed by: ORTHOPAEDIC SURGERY

## 2017-11-14 RX ORDER — BETAMETHASONE SODIUM PHOSPHATE AND BETAMETHASONE ACETATE 3; 3 MG/ML; MG/ML
6 INJECTION, SUSPENSION INTRA-ARTICULAR; INTRALESIONAL; INTRAMUSCULAR; SOFT TISSUE ONCE
Status: COMPLETED | OUTPATIENT
Start: 2017-11-14 | End: 2017-11-14

## 2017-11-14 RX ADMIN — BETAMETHASONE SODIUM PHOSPHATE AND BETAMETHASONE ACETATE 6 MG: 3; 3 INJECTION, SUSPENSION INTRA-ARTICULAR; INTRALESIONAL; INTRAMUSCULAR; SOFT TISSUE at 15:00

## 2017-11-15 ENCOUNTER — OFFICE VISIT (OUTPATIENT)
Dept: PULMONOLOGY | Age: 62
End: 2017-11-15

## 2017-11-15 VITALS — WEIGHT: 281.97 LBS | OXYGEN SATURATION: 97 % | BODY MASS INDEX: 48.14 KG/M2 | HEIGHT: 64 IN | HEART RATE: 99 BPM

## 2017-11-15 DIAGNOSIS — J98.01 ACUTE BRONCHOSPASM: ICD-10-CM

## 2017-11-15 DIAGNOSIS — J45.40 MODERATE PERSISTENT ASTHMATIC BRONCHITIS WITHOUT COMPLICATION: Primary | ICD-10-CM

## 2017-11-15 LAB
ALVEOLAR VOLUME, ACTUAL: NORMAL L
ALVEOLAR VOLUME, PREDICTED: NORMAL L
ALVEOLAR VOLUME,% PREDICTED: NORMAL %
DL/VA, ACTUAL: NORMAL ML/MIN/MMHG/L
DL/VA,% PREDICTED: NORMAL %
DL/VA,PREDICTED: NORMAL ML/MIN/MMHG/L
DLCO(COR),% PREDICTED: NORMAL %
DLCO(COR),ACTUAL: NORMAL ML/MIN/MMHG
DLCO(COR),PREDICTED: NORMAL MM/MIN/MMHG
DLCO(UNC),% PREDICTED: NORMAL %
DLCO(UNC),ACTUAL: NORMAL ML/MIN/MMHG
DLCO(UNC),PREDICTED: NORMAL ML/MIN/MMHG
ERV,% PREDICTED: NORMAL %
ERV,ACTUAL: NORMAL L
ERV,PREDICTED: NORMAL L
FRC, % PREDICTED: NORMAL %
FRC, ACTUAL: NORMAL L
FRC, PREDICTED: NORMAL L
FVC P BD VOL RESPIRATORY SPIROMETRY: NORMAL L
HGB PULM: NORMAL GM/DL
IC, PREDICTED: NORMAL L
IC,% PREDICTED: NORMAL %
IC,ACTUAL: NORMAL L
PULM BASE TEST: NORMAL
RV, % PREDICTED: NORMAL %
RV, ACTUAL: NORMAL L
RV, PREDICTED: NORMAL L
RV/TLC, ACTUAL: NORMAL %
RV/TLC, PREDICTED: NORMAL %
SPIRO:FEF 25-75%,% PREDICTED: NORMAL
SPIRO:FEF 25-75%,ACTUAL: NORMAL
SPIRO:FEF 25-75%,PREDICTED: NORMAL
SPIRO:FEF MAX (PEF),% PREDICTED: NORMAL
SPIRO:FEF MAX (PEF),ACTUAL: NORMAL
SPIRO:FEF MAX (PEF),PREDICTED: NORMAL
SPIRO:FEV1,% PREDICTED: NORMAL
SPIRO:FEV1,ACTUAL: NORMAL
SPIRO:FEV1,PREDICTED: NORMAL
SPIRO:FEV1/FVC,ACTUAL: NORMAL
SPIRO:FEV1/FVC,PREDICTED: NORMAL
SPIRO:FVC,% PREDICTED: NORMAL
SPIRO:FVC,ACTUAL: NORMAL
SPIRO:FVC,PREDICTED: NORMAL
SPIRO:POST FEF 25-75%,%CHANGE: NORMAL
SPIRO:POST FEF 25-75%,ACTUAL: NORMAL
SPIRO:POST FEF 25-75%,PREDICTED: NORMAL
SPIRO:POST FEF MAX (PEF),% CHANGE: NORMAL
SPIRO:POST FEF MAX (PEF),ACTUAL: NORMAL
SPIRO:POST FEF MAX (PEF),PREDICTED: NORMAL
SPIRO:POST FEV1,% CHANGE: NORMAL
SPIRO:POST FEV1,ACTUAL: NORMAL
SPIRO:POST FEV1,PREDICTED: NORMAL
SPIRO:POST FEV1/FVC,% CHANGE: NORMAL
SPIRO:POST FEV1/FVC,ACTUAL: NORMAL
SPIRO:POST FVC,% CHANGE: NORMAL
SPIRO:POST FVC,ACTUAL: NORMAL
SPIRO:POST FVC,PREDICTED: NORMAL
SVC,% PREDICTED: NORMAL %
SVC,ACTUAL: NORMAL L
SVC,PREDICTED: NORMAL L
TLC, % PREDICTED: NORMAL %
TLC, PREDICTED: NORMAL L
TLC,ACTUAL: NORMAL L

## 2017-11-15 PROCEDURE — 94726 PLETHYSMOGRAPHY LUNG VOLUMES: CPT | Performed by: INTERNAL MEDICINE

## 2017-11-15 PROCEDURE — 94060 EVALUATION OF WHEEZING: CPT | Performed by: INTERNAL MEDICINE

## 2017-11-15 PROCEDURE — 94729 DIFFUSING CAPACITY: CPT | Performed by: INTERNAL MEDICINE

## 2017-11-15 RX ORDER — ALBUTEROL SULFATE 2.5 MG/3ML
2.5 SOLUTION RESPIRATORY (INHALATION) ONCE
Status: COMPLETED | OUTPATIENT
Start: 2017-11-15 | End: 2017-11-15

## 2017-11-15 RX ADMIN — ALBUTEROL SULFATE 2.5 MG: 2.5 SOLUTION RESPIRATORY (INHALATION) at 11:13

## 2017-11-16 RX ORDER — ACETAMINOPHEN AND CODEINE PHOSPHATE 300; 30 MG/1; MG/1
1-2 TABLET ORAL PRN
Qty: 180 TABLET | Refills: 1 | Status: SHIPPED | COMMUNITY
Start: 2017-11-16 | End: 2017-12-13 | Stop reason: SDUPTHER

## 2017-11-20 RX ORDER — BENZONATATE 100 MG/1
100-200 CAPSULE ORAL 3 TIMES DAILY PRN
Qty: 60 CAPSULE | Refills: 0 | Status: SHIPPED | OUTPATIENT
Start: 2017-11-20 | End: 2017-12-14 | Stop reason: SDUPTHER

## 2017-11-21 ENCOUNTER — TELEPHONE (OUTPATIENT)
Dept: PULMONOLOGY | Age: 62
End: 2017-11-21

## 2017-11-21 ENCOUNTER — TELEPHONE (OUTPATIENT)
Dept: INTERNAL MEDICINE | Age: 62
End: 2017-11-21

## 2017-11-21 DIAGNOSIS — R41.3 MEMORY LOSS: Primary | ICD-10-CM

## 2017-11-21 DIAGNOSIS — J45.41 MODERATE PERSISTENT ASTHMA WITH EXACERBATION: Primary | ICD-10-CM

## 2017-11-21 RX ORDER — PREDNISONE 10 MG/1
30 TABLET ORAL DAILY
Qty: 21 TABLET | Refills: 0 | Status: SHIPPED | OUTPATIENT
Start: 2017-11-21 | End: 2017-11-21 | Stop reason: SDUPTHER

## 2017-11-21 RX ORDER — PREDNISONE 10 MG/1
30 TABLET ORAL DAILY
Qty: 21 TABLET | Refills: 0 | Status: SHIPPED | OUTPATIENT
Start: 2017-11-21 | End: 2018-01-16 | Stop reason: ALTCHOICE

## 2017-11-22 ENCOUNTER — TELEPHONE (OUTPATIENT)
Dept: INTERNAL MEDICINE | Age: 62
End: 2017-11-22

## 2017-11-22 RX ORDER — FLUCONAZOLE 100 MG/1
100 TABLET ORAL 2 TIMES DAILY
Qty: 14 TABLET | Refills: 0 | Status: SHIPPED | OUTPATIENT
Start: 2017-11-22 | End: 2017-11-29

## 2017-11-24 ENCOUNTER — E-ADVICE (OUTPATIENT)
Dept: INTERNAL MEDICINE | Age: 62
End: 2017-11-24

## 2017-11-24 ENCOUNTER — TELEPHONE (OUTPATIENT)
Dept: INTERNAL MEDICINE | Age: 62
End: 2017-11-24

## 2017-11-24 RX ORDER — HYDROXYZINE 50 MG/1
50 TABLET, FILM COATED ORAL 3 TIMES DAILY PRN
Qty: 30 TABLET | Refills: 3 | Status: SHIPPED | OUTPATIENT
Start: 2017-11-24 | End: 2017-11-24 | Stop reason: SDUPTHER

## 2017-11-24 RX ORDER — HYDROXYZINE 50 MG/1
TABLET, FILM COATED ORAL
Qty: 30 TABLET | Refills: 3 | Status: SHIPPED | OUTPATIENT
Start: 2017-11-24 | End: 2017-11-27 | Stop reason: SDUPTHER

## 2017-11-25 ENCOUNTER — WALK IN (OUTPATIENT)
Dept: URGENT CARE | Age: 62
End: 2017-11-25

## 2017-11-25 VITALS
HEART RATE: 108 BPM | OXYGEN SATURATION: 95 % | SYSTOLIC BLOOD PRESSURE: 130 MMHG | DIASTOLIC BLOOD PRESSURE: 69 MMHG | RESPIRATION RATE: 18 BRPM | TEMPERATURE: 97.7 F

## 2017-11-25 DIAGNOSIS — J18.0 BRONCHOPNEUMONIA: Primary | ICD-10-CM

## 2017-11-25 PROCEDURE — 99214 OFFICE O/P EST MOD 30 MIN: CPT | Performed by: PEDIATRICS

## 2017-11-25 RX ORDER — CLARITHROMYCIN 250 MG/1
250 TABLET, FILM COATED ORAL 2 TIMES DAILY
Qty: 20 TABLET | Refills: 0 | Status: SHIPPED | OUTPATIENT
Start: 2017-11-25 | End: 2018-01-12 | Stop reason: ALTCHOICE

## 2017-11-27 ENCOUNTER — E-ADVICE (OUTPATIENT)
Dept: INTERNAL MEDICINE | Age: 62
End: 2017-11-27

## 2017-11-27 RX ORDER — HYDROXYZINE 50 MG/1
TABLET, FILM COATED ORAL
Qty: 360 TABLET | Refills: 1 | Status: SHIPPED | OUTPATIENT
Start: 2017-11-27 | End: 2018-04-25 | Stop reason: SDUPTHER

## 2017-11-30 ENCOUNTER — HOSPITAL ENCOUNTER (OUTPATIENT)
Dept: REHABILITATION | Age: 62
Discharge: STILL ADMITTED OR ADMINISTRATIVE | End: 2017-11-30
Attending: ORTHOPAEDIC SURGERY

## 2017-11-30 ENCOUNTER — OFFICE VISIT (OUTPATIENT)
Dept: PULMONOLOGY | Age: 62
End: 2017-11-30

## 2017-11-30 VITALS
OXYGEN SATURATION: 97 % | HEIGHT: 65 IN | SYSTOLIC BLOOD PRESSURE: 112 MMHG | WEIGHT: 277 LBS | HEART RATE: 70 BPM | DIASTOLIC BLOOD PRESSURE: 78 MMHG | BODY MASS INDEX: 46.15 KG/M2

## 2017-11-30 DIAGNOSIS — J45.50 UNCONTROLLED SEVERE PERSISTENT ASTHMA: Primary | ICD-10-CM

## 2017-11-30 DIAGNOSIS — M75.102 ROTATOR CUFF SYNDROME OF LEFT SHOULDER: ICD-10-CM

## 2017-11-30 PROCEDURE — 29240 STRAPPING OF SHOULDER: CPT | Performed by: PHYSICAL THERAPIST

## 2017-11-30 PROCEDURE — 97110 THERAPEUTIC EXERCISES: CPT | Performed by: PHYSICAL THERAPIST

## 2017-11-30 PROCEDURE — 97161 PT EVAL LOW COMPLEX 20 MIN: CPT | Performed by: PHYSICAL THERAPIST

## 2017-11-30 PROCEDURE — 10004173 HB COUNTER-THERAPY VISIT PT: Performed by: PHYSICAL THERAPIST

## 2017-11-30 PROCEDURE — 99214 OFFICE O/P EST MOD 30 MIN: CPT | Performed by: INTERNAL MEDICINE

## 2017-11-30 RX ORDER — FLUTICASONE PROPIONATE AND SALMETEROL 500; 50 UG/1; UG/1
2 POWDER RESPIRATORY (INHALATION) 2 TIMES DAILY
Qty: 6 EACH | Refills: 3 | Status: SHIPPED | OUTPATIENT
Start: 2017-11-30 | End: 2018-01-09 | Stop reason: SDUPTHER

## 2017-11-30 RX ORDER — CODEINE PHOSPHATE AND GUAIFENESIN 10; 100 MG/5ML; MG/5ML
5 SOLUTION ORAL 3 TIMES DAILY PRN
Qty: 240 ML | Refills: 0 | Status: SHIPPED | OUTPATIENT
Start: 2017-11-30 | End: 2018-01-04

## 2017-12-05 ENCOUNTER — HOSPITAL ENCOUNTER (OUTPATIENT)
Dept: REHABILITATION | Age: 62
Discharge: STILL ADMITTED OR ADMINISTRATIVE | End: 2017-12-05
Attending: ORTHOPAEDIC SURGERY

## 2017-12-05 ENCOUNTER — TELEPHONE (OUTPATIENT)
Dept: ALLERGY | Age: 62
End: 2017-12-05

## 2017-12-05 DIAGNOSIS — D80.1 HYPOGAMMAGLOBULINEMIA (CMD): Primary | ICD-10-CM

## 2017-12-05 PROCEDURE — 97035 APP MDLTY 1+ULTRASOUND EA 15: CPT | Performed by: PHYSICAL THERAPIST

## 2017-12-05 PROCEDURE — 29240 STRAPPING OF SHOULDER: CPT | Performed by: PHYSICAL THERAPIST

## 2017-12-05 PROCEDURE — 10004173 HB COUNTER-THERAPY VISIT PT: Performed by: PHYSICAL THERAPIST

## 2017-12-05 PROCEDURE — 97140 MANUAL THERAPY 1/> REGIONS: CPT | Performed by: PHYSICAL THERAPIST

## 2017-12-06 ENCOUNTER — OFFICE VISIT (OUTPATIENT)
Dept: HEMATOLOGY/ONCOLOGY | Age: 62
End: 2017-12-06
Attending: ALLERGY & IMMUNOLOGY

## 2017-12-06 ENCOUNTER — TELEPHONE (OUTPATIENT)
Dept: PULMONOLOGY | Age: 62
End: 2017-12-06

## 2017-12-06 ENCOUNTER — LAB SERVICES (OUTPATIENT)
Dept: HEMATOLOGY/ONCOLOGY | Age: 62
End: 2017-12-06
Attending: ALLERGY & IMMUNOLOGY

## 2017-12-06 DIAGNOSIS — D80.1 HYPOGAMMAGLOBULINEMIA (CMD): Primary | ICD-10-CM

## 2017-12-06 LAB
BUN SERPL-MCNC: 12 MG/DL (ref 6–20)
CREAT SERPL-MCNC: 1.31 MG/DL (ref 0.51–0.95)

## 2017-12-06 PROCEDURE — 82565 ASSAY OF CREATININE: CPT

## 2017-12-06 PROCEDURE — 10002800 HB RX 250 W HCPCS: Performed by: ALLERGY & IMMUNOLOGY

## 2017-12-06 PROCEDURE — 96365 THER/PROPH/DIAG IV INF INIT: CPT | Performed by: ALLERGY & IMMUNOLOGY

## 2017-12-06 PROCEDURE — 10002807 HB RX 258: Performed by: ALLERGY & IMMUNOLOGY

## 2017-12-06 PROCEDURE — 10002803 HB RX 637: Performed by: ALLERGY & IMMUNOLOGY

## 2017-12-06 PROCEDURE — 84520 ASSAY OF UREA NITROGEN: CPT

## 2017-12-06 PROCEDURE — 96366 THER/PROPH/DIAG IV INF ADDON: CPT | Performed by: ALLERGY & IMMUNOLOGY

## 2017-12-06 RX ORDER — DIPHENHYDRAMINE HCL 25 MG
25 CAPSULE ORAL ONCE
Status: COMPLETED | OUTPATIENT
Start: 2017-12-06 | End: 2017-12-06

## 2017-12-06 RX ORDER — ACETAMINOPHEN 325 MG/1
650 TABLET ORAL ONCE
Status: DISCONTINUED | OUTPATIENT
Start: 2017-12-06 | End: 2017-12-06 | Stop reason: HOSPADM

## 2017-12-06 RX ADMIN — SODIUM CHLORIDE 250 ML: 9 INJECTION, SOLUTION INTRAVENOUS at 11:35

## 2017-12-06 RX ADMIN — DIPHENHYDRAMINE HYDROCHLORIDE 25 MG: 25 CAPSULE ORAL at 11:35

## 2017-12-06 RX ADMIN — IMMUNE GLOBULIN INFUSION (HUMAN) 25 G: 100 INJECTION, SOLUTION INTRAVENOUS; SUBCUTANEOUS at 11:59

## 2017-12-06 ASSESSMENT — PAIN SCALES - GENERAL: PAINLEVEL: 0

## 2017-12-07 ENCOUNTER — TELEPHONE (OUTPATIENT)
Dept: PULMONOLOGY | Age: 62
End: 2017-12-07

## 2017-12-07 ENCOUNTER — IMAGING SERVICES (OUTPATIENT)
Dept: GENERAL RADIOLOGY | Age: 62
End: 2017-12-07

## 2017-12-07 ENCOUNTER — OFFICE VISIT (OUTPATIENT)
Dept: UROLOGY | Age: 62
End: 2017-12-07

## 2017-12-07 ENCOUNTER — HOSPITAL ENCOUNTER (OUTPATIENT)
Dept: REHABILITATION | Age: 62
Discharge: STILL ADMITTED OR ADMINISTRATIVE | End: 2017-12-07
Attending: ORTHOPAEDIC SURGERY

## 2017-12-07 VITALS — RESPIRATION RATE: 18 BRPM | BODY MASS INDEX: 46.59 KG/M2 | WEIGHT: 280 LBS | HEART RATE: 100 BPM

## 2017-12-07 DIAGNOSIS — N20.0 CALCULUS OF KIDNEY: ICD-10-CM

## 2017-12-07 DIAGNOSIS — E11.9 TYPE 2 DIABETES MELLITUS WITHOUT COMPLICATION, WITHOUT LONG-TERM CURRENT USE OF INSULIN (CMD): ICD-10-CM

## 2017-12-07 DIAGNOSIS — N32.81 OAB (OVERACTIVE BLADDER): Primary | ICD-10-CM

## 2017-12-07 DIAGNOSIS — N32.81 OAB (OVERACTIVE BLADDER): ICD-10-CM

## 2017-12-07 LAB — BLDR SCAN MLS: NORMAL

## 2017-12-07 PROCEDURE — 97140 MANUAL THERAPY 1/> REGIONS: CPT | Performed by: PHYSICAL THERAPIST

## 2017-12-07 PROCEDURE — 99203 OFFICE O/P NEW LOW 30 MIN: CPT | Performed by: UROLOGY

## 2017-12-07 PROCEDURE — 29240 STRAPPING OF SHOULDER: CPT | Performed by: PHYSICAL THERAPIST

## 2017-12-07 PROCEDURE — 74000 XR ABDOMEN 1 VW KUB SUPINE: CPT | Performed by: RADIOLOGY

## 2017-12-07 PROCEDURE — 97110 THERAPEUTIC EXERCISES: CPT | Performed by: PHYSICAL THERAPIST

## 2017-12-07 PROCEDURE — 10004173 HB COUNTER-THERAPY VISIT PT: Performed by: PHYSICAL THERAPIST

## 2017-12-07 PROCEDURE — 51798 US URINE CAPACITY MEASURE: CPT | Performed by: UROLOGY

## 2017-12-07 PROCEDURE — 97035 APP MDLTY 1+ULTRASOUND EA 15: CPT | Performed by: PHYSICAL THERAPIST

## 2017-12-12 ENCOUNTER — APPOINTMENT (OUTPATIENT)
Dept: REHABILITATION | Age: 62
End: 2017-12-12
Attending: ORTHOPAEDIC SURGERY

## 2017-12-13 ENCOUNTER — TELEPHONE (OUTPATIENT)
Dept: INTERNAL MEDICINE | Age: 62
End: 2017-12-13

## 2017-12-13 RX ORDER — ACETAMINOPHEN AND CODEINE PHOSPHATE 300; 30 MG/1; MG/1
TABLET ORAL
Qty: 180 TABLET | Refills: 1 | Status: SHIPPED | COMMUNITY
Start: 2017-12-13 | End: 2017-12-13 | Stop reason: SDUPTHER

## 2017-12-14 ENCOUNTER — HOSPITAL ENCOUNTER (OUTPATIENT)
Dept: REHABILITATION | Age: 62
Discharge: STILL ADMITTED OR ADMINISTRATIVE | End: 2017-12-14
Attending: ORTHOPAEDIC SURGERY

## 2017-12-14 PROCEDURE — 97110 THERAPEUTIC EXERCISES: CPT | Performed by: PHYSICAL THERAPIST

## 2017-12-14 PROCEDURE — 29240 STRAPPING OF SHOULDER: CPT | Performed by: PHYSICAL THERAPIST

## 2017-12-14 PROCEDURE — 10004173 HB COUNTER-THERAPY VISIT PT: Performed by: PHYSICAL THERAPIST

## 2017-12-14 PROCEDURE — 97140 MANUAL THERAPY 1/> REGIONS: CPT | Performed by: PHYSICAL THERAPIST

## 2017-12-14 PROCEDURE — 97033 APP MDLTY 1+IONTPHRSIS EA 15: CPT | Performed by: PHYSICAL THERAPIST

## 2017-12-14 RX ORDER — ACETAMINOPHEN AND CODEINE PHOSPHATE 300; 30 MG/1; MG/1
TABLET ORAL
Qty: 180 TABLET | Refills: 1 | Status: SHIPPED | OUTPATIENT
Start: 2017-12-14 | End: 2018-02-10 | Stop reason: SDUPTHER

## 2017-12-15 ENCOUNTER — TELEPHONE (OUTPATIENT)
Dept: INTERNAL MEDICINE | Age: 62
End: 2017-12-15

## 2017-12-15 RX ORDER — BENZONATATE 100 MG/1
100-200 CAPSULE ORAL 3 TIMES DAILY PRN
Qty: 60 CAPSULE | Refills: 0 | Status: SHIPPED | OUTPATIENT
Start: 2017-12-15 | End: 2018-01-05

## 2017-12-15 RX ORDER — LEVOFLOXACIN 500 MG/1
500 TABLET, FILM COATED ORAL DAILY
Qty: 10 TABLET | Refills: 0 | Status: SHIPPED | OUTPATIENT
Start: 2017-12-15 | End: 2017-12-25

## 2017-12-19 ENCOUNTER — HOSPITAL ENCOUNTER (OUTPATIENT)
Dept: REHABILITATION | Age: 62
Discharge: STILL ADMITTED OR ADMINISTRATIVE | End: 2017-12-19
Attending: ORTHOPAEDIC SURGERY

## 2017-12-19 PROCEDURE — 10004173 HB COUNTER-THERAPY VISIT PT: Performed by: PHYSICAL THERAPIST

## 2017-12-19 PROCEDURE — 29240 STRAPPING OF SHOULDER: CPT | Performed by: PHYSICAL THERAPIST

## 2017-12-19 PROCEDURE — 97033 APP MDLTY 1+IONTPHRSIS EA 15: CPT | Performed by: PHYSICAL THERAPIST

## 2017-12-19 PROCEDURE — 97140 MANUAL THERAPY 1/> REGIONS: CPT | Performed by: PHYSICAL THERAPIST

## 2017-12-20 ENCOUNTER — OFFICE VISIT (OUTPATIENT)
Dept: ALLERGY | Age: 62
End: 2017-12-20

## 2017-12-20 VITALS
TEMPERATURE: 96.9 F | HEART RATE: 56 BPM | SYSTOLIC BLOOD PRESSURE: 122 MMHG | DIASTOLIC BLOOD PRESSURE: 80 MMHG | RESPIRATION RATE: 20 BRPM

## 2017-12-20 DIAGNOSIS — J32.9 RECURRENT SINUS INFECTIONS: ICD-10-CM

## 2017-12-20 DIAGNOSIS — D83.9 CVID (COMMON VARIABLE IMMUNODEFICIENCY) (CMD): Primary | ICD-10-CM

## 2017-12-20 PROCEDURE — 99213 OFFICE O/P EST LOW 20 MIN: CPT | Performed by: ALLERGY & IMMUNOLOGY

## 2017-12-21 ENCOUNTER — OFFICE VISIT (OUTPATIENT)
Dept: PULMONOLOGY | Age: 62
End: 2017-12-21

## 2017-12-21 ENCOUNTER — TELEPHONE (OUTPATIENT)
Dept: INTERNAL MEDICINE | Age: 62
End: 2017-12-21

## 2017-12-21 VITALS
DIASTOLIC BLOOD PRESSURE: 80 MMHG | HEART RATE: 97 BPM | WEIGHT: 271 LBS | SYSTOLIC BLOOD PRESSURE: 124 MMHG | BODY MASS INDEX: 45.15 KG/M2 | OXYGEN SATURATION: 95 % | HEIGHT: 65 IN

## 2017-12-21 DIAGNOSIS — J45.50 SEVERE PERSISTENT ASTHMA, UNSPECIFIED WHETHER COMPLICATED: ICD-10-CM

## 2017-12-21 DIAGNOSIS — J45.909 INTRINSIC ASTHMA: Primary | ICD-10-CM

## 2017-12-21 PROCEDURE — 96372 THER/PROPH/DIAG INJ SC/IM: CPT | Performed by: INTERNAL MEDICINE

## 2017-12-21 RX ORDER — FLUCONAZOLE 100 MG/1
100 TABLET ORAL 2 TIMES DAILY
Qty: 14 TABLET | Refills: 0 | Status: SHIPPED | OUTPATIENT
Start: 2017-12-21 | End: 2017-12-29

## 2017-12-22 ENCOUNTER — HOSPITAL ENCOUNTER (OUTPATIENT)
Dept: REHABILITATION | Age: 62
Discharge: STILL ADMITTED OR ADMINISTRATIVE | End: 2017-12-22
Attending: ORTHOPAEDIC SURGERY

## 2017-12-22 PROCEDURE — 97110 THERAPEUTIC EXERCISES: CPT | Performed by: PHYSICAL THERAPIST

## 2017-12-22 PROCEDURE — 97140 MANUAL THERAPY 1/> REGIONS: CPT | Performed by: PHYSICAL THERAPIST

## 2017-12-22 PROCEDURE — 97033 APP MDLTY 1+IONTPHRSIS EA 15: CPT | Performed by: PHYSICAL THERAPIST

## 2017-12-22 PROCEDURE — 10004173 HB COUNTER-THERAPY VISIT PT: Performed by: PHYSICAL THERAPIST

## 2017-12-26 ENCOUNTER — TELEPHONE (OUTPATIENT)
Dept: ALLERGY | Age: 62
End: 2017-12-26

## 2017-12-26 ENCOUNTER — OFFICE VISIT (OUTPATIENT)
Dept: ORTHOPEDICS | Age: 62
End: 2017-12-26

## 2017-12-26 ENCOUNTER — APPOINTMENT (OUTPATIENT)
Dept: REHABILITATION | Age: 62
End: 2017-12-26
Attending: ORTHOPAEDIC SURGERY

## 2017-12-26 DIAGNOSIS — D80.1 HYPOGAMMAGLOBULINEMIA (CMD): ICD-10-CM

## 2017-12-26 DIAGNOSIS — G89.29 CHRONIC PAIN OF BOTH SHOULDERS: Primary | ICD-10-CM

## 2017-12-26 DIAGNOSIS — F43.10 PTSD (POST-TRAUMATIC STRESS DISORDER): ICD-10-CM

## 2017-12-26 DIAGNOSIS — M25.511 CHRONIC PAIN OF BOTH SHOULDERS: Primary | ICD-10-CM

## 2017-12-26 DIAGNOSIS — N18.30 CKD (CHRONIC KIDNEY DISEASE), STAGE III (CMD): ICD-10-CM

## 2017-12-26 DIAGNOSIS — F32.A DEPRESSIVE DISORDER: ICD-10-CM

## 2017-12-26 DIAGNOSIS — E11.9 TYPE 2 DIABETES MELLITUS WITHOUT COMPLICATION, WITHOUT LONG-TERM CURRENT USE OF INSULIN (CMD): ICD-10-CM

## 2017-12-26 DIAGNOSIS — M25.512 CHRONIC PAIN OF BOTH SHOULDERS: Primary | ICD-10-CM

## 2017-12-26 PROCEDURE — 99213 OFFICE O/P EST LOW 20 MIN: CPT | Performed by: ORTHOPAEDIC SURGERY

## 2017-12-28 ENCOUNTER — HOSPITAL ENCOUNTER (OUTPATIENT)
Dept: REHABILITATION | Age: 62
Discharge: STILL ADMITTED OR ADMINISTRATIVE | End: 2017-12-28
Attending: ORTHOPAEDIC SURGERY

## 2017-12-28 ENCOUNTER — TELEPHONE (OUTPATIENT)
Dept: INTERNAL MEDICINE | Age: 62
End: 2017-12-28

## 2017-12-28 PROCEDURE — 97014 ELECTRIC STIMULATION THERAPY: CPT | Performed by: PHYSICAL THERAPIST

## 2017-12-28 PROCEDURE — 29240 STRAPPING OF SHOULDER: CPT | Performed by: PHYSICAL THERAPIST

## 2017-12-28 PROCEDURE — 97033 APP MDLTY 1+IONTPHRSIS EA 15: CPT | Performed by: PHYSICAL THERAPIST

## 2017-12-28 PROCEDURE — 10004173 HB COUNTER-THERAPY VISIT PT: Performed by: PHYSICAL THERAPIST

## 2017-12-28 PROCEDURE — 97799 UNLISTED PHYSCL MED/REHAB PX: CPT | Performed by: PHYSICAL THERAPIST

## 2017-12-29 ENCOUNTER — TELEPHONE (OUTPATIENT)
Dept: ALLERGY | Age: 62
End: 2017-12-29

## 2018-01-01 ENCOUNTER — EXTERNAL RECORD (OUTPATIENT)
Dept: OTHER | Age: 63
End: 2018-01-01

## 2018-01-03 ENCOUNTER — APPOINTMENT (OUTPATIENT)
Dept: REHABILITATION | Age: 63
End: 2018-01-03
Attending: ORTHOPAEDIC SURGERY

## 2018-01-03 ENCOUNTER — WALK IN (OUTPATIENT)
Dept: URGENT CARE | Age: 63
End: 2018-01-03

## 2018-01-03 DIAGNOSIS — J01.91 ACUTE RECURRENT SINUSITIS, UNSPECIFIED LOCATION: Primary | ICD-10-CM

## 2018-01-03 PROCEDURE — 99213 OFFICE O/P EST LOW 20 MIN: CPT | Performed by: NURSE PRACTITIONER

## 2018-01-03 RX ORDER — FLUCONAZOLE 100 MG/1
100 TABLET ORAL 2 TIMES DAILY
Qty: 14 TABLET | Refills: 0 | Status: SHIPPED | OUTPATIENT
Start: 2018-01-03 | End: 2018-01-10

## 2018-01-03 RX ORDER — DOXYCYCLINE HYCLATE 100 MG/1
100 CAPSULE ORAL 2 TIMES DAILY
Qty: 20 CAPSULE | Refills: 0 | Status: SHIPPED | OUTPATIENT
Start: 2018-01-03 | End: 2018-02-27 | Stop reason: SDUPTHER

## 2018-01-03 ASSESSMENT — ENCOUNTER SYMPTOMS
COUGH: 1
SHORTNESS OF BREATH: 0
VOMITING: 0
DIARRHEA: 0
SINUS PAIN: 1
NAUSEA: 0
SINUS PRESSURE: 1
EYE REDNESS: 0
EYE DISCHARGE: 0
APPETITE CHANGE: 1
FEVER: 0
WHEEZING: 0
ABDOMINAL PAIN: 0
SORE THROAT: 0
EYE PAIN: 0

## 2018-01-03 ASSESSMENT — PAIN SCALES - GENERAL: PAINLEVEL: 3-4

## 2018-01-04 ENCOUNTER — APPOINTMENT (OUTPATIENT)
Dept: REHABILITATION | Age: 63
End: 2018-01-04

## 2018-01-04 RX ORDER — CODEINE PHOSPHATE AND GUAIFENESIN 10; 100 MG/5ML; MG/5ML
5 SOLUTION ORAL 3 TIMES DAILY PRN
Qty: 240 ML | Refills: 0 | Status: CANCELLED | OUTPATIENT
Start: 2018-01-04

## 2018-01-05 ENCOUNTER — APPOINTMENT (OUTPATIENT)
Dept: HEMATOLOGY/ONCOLOGY | Age: 63
End: 2018-01-05
Attending: ALLERGY & IMMUNOLOGY

## 2018-01-05 ENCOUNTER — TELEPHONE (OUTPATIENT)
Dept: ALLERGY | Age: 63
End: 2018-01-05

## 2018-01-05 DIAGNOSIS — D80.1 HYPOGAMMAGLOBULINEMIA (CMD): Primary | ICD-10-CM

## 2018-01-05 RX ORDER — CODEINE PHOSPHATE AND GUAIFENESIN 10; 100 MG/5ML; MG/5ML
5 SOLUTION ORAL 3 TIMES DAILY PRN
Qty: 240 ML | Refills: 0 | Status: SHIPPED | OUTPATIENT
Start: 2018-01-05 | End: 2018-02-22 | Stop reason: SDUPTHER

## 2018-01-05 RX ORDER — BENZONATATE 100 MG/1
100-200 CAPSULE ORAL 3 TIMES DAILY PRN
Qty: 60 CAPSULE | Refills: 0 | Status: SHIPPED | OUTPATIENT
Start: 2018-01-05 | End: 2018-01-26 | Stop reason: SDUPTHER

## 2018-01-06 ENCOUNTER — HOSPITAL ENCOUNTER (OUTPATIENT)
Dept: CT IMAGING | Age: 63
Discharge: HOME | End: 2018-01-06
Attending: ALLERGY & IMMUNOLOGY

## 2018-01-06 DIAGNOSIS — J32.9 RECURRENT SINUS INFECTIONS: ICD-10-CM

## 2018-01-06 PROCEDURE — 70486 CT MAXILLOFACIAL W/O DYE: CPT | Performed by: RADIOLOGY

## 2018-01-06 PROCEDURE — 70486 CT MAXILLOFACIAL W/O DYE: CPT

## 2018-01-08 ENCOUNTER — E-ADVICE (OUTPATIENT)
Dept: ALLERGY | Age: 63
End: 2018-01-08

## 2018-01-09 ENCOUNTER — APPOINTMENT (OUTPATIENT)
Dept: REHABILITATION | Age: 63
End: 2018-01-09
Attending: ORTHOPAEDIC SURGERY

## 2018-01-09 ENCOUNTER — E-ADVICE (OUTPATIENT)
Dept: ALLERGY | Age: 63
End: 2018-01-09

## 2018-01-09 ENCOUNTER — E-ADVICE (OUTPATIENT)
Dept: PULMONOLOGY | Age: 63
End: 2018-01-09

## 2018-01-09 RX ORDER — FLUTICASONE PROPIONATE AND SALMETEROL 500; 50 UG/1; UG/1
2 POWDER RESPIRATORY (INHALATION) 2 TIMES DAILY
Qty: 60 EACH | Refills: 0 | Status: SHIPPED | OUTPATIENT
Start: 2018-01-09 | End: 2018-02-19 | Stop reason: SDUPTHER

## 2018-01-10 ENCOUNTER — TELEPHONE (OUTPATIENT)
Dept: PULMONOLOGY | Age: 63
End: 2018-01-10

## 2018-01-11 ENCOUNTER — TELEPHONE (OUTPATIENT)
Dept: PULMONOLOGY | Age: 63
End: 2018-01-11

## 2018-01-11 ENCOUNTER — APPOINTMENT (OUTPATIENT)
Dept: REHABILITATION | Age: 63
End: 2018-01-11
Attending: ORTHOPAEDIC SURGERY

## 2018-01-11 RX ORDER — CLONAZEPAM 2 MG/1
2 TABLET ORAL NIGHTLY PRN
Qty: 90 TABLET | Refills: 0 | Status: SHIPPED | OUTPATIENT
Start: 2018-01-11 | End: 2018-02-10 | Stop reason: SDUPTHER

## 2018-01-12 ENCOUNTER — OFFICE VISIT (OUTPATIENT)
Dept: RHEUMATOLOGY | Age: 63
End: 2018-01-12

## 2018-01-12 VITALS
SYSTOLIC BLOOD PRESSURE: 128 MMHG | WEIGHT: 275.5 LBS | BODY MASS INDEX: 45.85 KG/M2 | HEART RATE: 80 BPM | DIASTOLIC BLOOD PRESSURE: 82 MMHG

## 2018-01-12 DIAGNOSIS — M79.7 FIBROMYALGIA: Primary | ICD-10-CM

## 2018-01-12 DIAGNOSIS — M15.9 GOA (GENERALIZED OSTEOARTHRITIS): ICD-10-CM

## 2018-01-12 PROCEDURE — 99213 OFFICE O/P EST LOW 20 MIN: CPT | Performed by: INTERNAL MEDICINE

## 2018-01-15 ENCOUNTER — OFFICE VISIT (OUTPATIENT)
Dept: CHIROPRACTIC MEDICINE | Age: 63
End: 2018-01-15

## 2018-01-15 ENCOUNTER — TELEPHONE (OUTPATIENT)
Dept: RHEUMATOLOGY | Age: 63
End: 2018-01-15

## 2018-01-15 DIAGNOSIS — M99.02 SEGMENTAL AND SOMATIC DYSFUNCTION OF THORACIC REGION: ICD-10-CM

## 2018-01-15 DIAGNOSIS — M54.6 PAIN IN THORACIC SPINE: ICD-10-CM

## 2018-01-15 DIAGNOSIS — M25.551 ACUTE PAIN OF RIGHT HIP: ICD-10-CM

## 2018-01-15 DIAGNOSIS — M53.3 SACRAL PAIN: ICD-10-CM

## 2018-01-15 DIAGNOSIS — M99.05 SEGMENTAL AND SOMATIC DYSFUNCTION OF PELVIC REGION: ICD-10-CM

## 2018-01-15 DIAGNOSIS — M99.04 SEGMENTAL AND SOMATIC DYSFUNCTION OF SACRAL REGION: ICD-10-CM

## 2018-01-15 DIAGNOSIS — M99.03 SEGMENTAL AND SOMATIC DYSFUNCTION OF LUMBAR REGION: Primary | ICD-10-CM

## 2018-01-15 PROCEDURE — 98941 CHIROPRACT MANJ 3-4 REGIONS: CPT | Performed by: CHIROPRACTOR

## 2018-01-16 ENCOUNTER — OFFICE VISIT (OUTPATIENT)
Dept: INTERNAL MEDICINE | Age: 63
End: 2018-01-16

## 2018-01-16 ENCOUNTER — OFFICE VISIT (OUTPATIENT)
Dept: ORTHOPEDICS | Age: 63
End: 2018-01-16

## 2018-01-16 ENCOUNTER — IMAGING SERVICES (OUTPATIENT)
Dept: GENERAL RADIOLOGY | Age: 63
End: 2018-01-16
Attending: PHYSICIAN ASSISTANT

## 2018-01-16 VITALS
HEART RATE: 92 BPM | DIASTOLIC BLOOD PRESSURE: 78 MMHG | HEIGHT: 65 IN | RESPIRATION RATE: 18 BRPM | OXYGEN SATURATION: 97 % | BODY MASS INDEX: 45.91 KG/M2 | SYSTOLIC BLOOD PRESSURE: 122 MMHG | WEIGHT: 275.57 LBS

## 2018-01-16 VITALS
BODY MASS INDEX: 45.93 KG/M2 | HEART RATE: 88 BPM | SYSTOLIC BLOOD PRESSURE: 104 MMHG | DIASTOLIC BLOOD PRESSURE: 64 MMHG | WEIGHT: 276 LBS

## 2018-01-16 DIAGNOSIS — M70.61 GREATER TROCHANTERIC BURSITIS OF RIGHT HIP: ICD-10-CM

## 2018-01-16 DIAGNOSIS — M79.7 FIBROMYALGIA SYNDROME: Primary | ICD-10-CM

## 2018-01-16 DIAGNOSIS — M25.551 RIGHT HIP PAIN: ICD-10-CM

## 2018-01-16 DIAGNOSIS — M25.551 RIGHT HIP PAIN: Primary | ICD-10-CM

## 2018-01-16 DIAGNOSIS — M70.61 TROCHANTERIC BURSITIS OF RIGHT HIP: ICD-10-CM

## 2018-01-16 PROCEDURE — 20610 DRAIN/INJ JOINT/BURSA W/O US: CPT | Performed by: PHYSICIAN ASSISTANT

## 2018-01-16 PROCEDURE — 99213 OFFICE O/P EST LOW 20 MIN: CPT | Performed by: INTERNAL MEDICINE

## 2018-01-16 PROCEDURE — 73502 X-RAY EXAM HIP UNI 2-3 VIEWS: CPT | Performed by: RADIOLOGY

## 2018-01-16 PROCEDURE — 99202 OFFICE O/P NEW SF 15 MIN: CPT | Performed by: PHYSICIAN ASSISTANT

## 2018-01-16 RX ORDER — GABAPENTIN 300 MG/1
300 CAPSULE ORAL 2 TIMES DAILY
Qty: 180 CAPSULE | Refills: 3 | Status: SHIPPED | OUTPATIENT
Start: 2018-01-16 | End: 2018-03-15 | Stop reason: SDUPTHER

## 2018-01-16 RX ORDER — BETAMETHASONE SODIUM PHOSPHATE AND BETAMETHASONE ACETATE 3; 3 MG/ML; MG/ML
12 INJECTION, SUSPENSION INTRA-ARTICULAR; INTRALESIONAL; INTRAMUSCULAR; SOFT TISSUE ONCE
Status: COMPLETED | OUTPATIENT
Start: 2018-01-16 | End: 2018-01-16

## 2018-01-16 RX ADMIN — BETAMETHASONE SODIUM PHOSPHATE AND BETAMETHASONE ACETATE 12 MG: 3; 3 INJECTION, SUSPENSION INTRA-ARTICULAR; INTRALESIONAL; INTRAMUSCULAR; SOFT TISSUE at 15:11

## 2018-01-17 ENCOUNTER — OFFICE VISIT (OUTPATIENT)
Dept: CHIROPRACTIC MEDICINE | Age: 63
End: 2018-01-17

## 2018-01-17 DIAGNOSIS — M99.02 THORACIC REGION SOMATIC DYSFUNCTION: ICD-10-CM

## 2018-01-17 DIAGNOSIS — M99.05 PELVIC SOMATIC DYSFUNCTION: ICD-10-CM

## 2018-01-17 DIAGNOSIS — M99.04 SACRAL REGION SOMATIC DYSFUNCTION: ICD-10-CM

## 2018-01-17 DIAGNOSIS — M99.03 LUMBAR REGION SOMATIC DYSFUNCTION: ICD-10-CM

## 2018-01-17 DIAGNOSIS — M54.6 PAIN IN THORACIC SPINE: ICD-10-CM

## 2018-01-17 DIAGNOSIS — M53.3 SACRAL PAIN: Primary | ICD-10-CM

## 2018-01-17 PROCEDURE — 98941 CHIROPRACT MANJ 3-4 REGIONS: CPT | Performed by: CHIROPRACTOR

## 2018-01-18 ENCOUNTER — E-ADVICE (OUTPATIENT)
Dept: ALLERGY | Age: 63
End: 2018-01-18

## 2018-01-18 ENCOUNTER — OFFICE VISIT (OUTPATIENT)
Dept: PULMONOLOGY | Age: 63
End: 2018-01-18

## 2018-01-18 VITALS
SYSTOLIC BLOOD PRESSURE: 110 MMHG | WEIGHT: 275 LBS | HEART RATE: 98 BPM | DIASTOLIC BLOOD PRESSURE: 70 MMHG | HEIGHT: 65 IN | BODY MASS INDEX: 45.82 KG/M2 | OXYGEN SATURATION: 98 %

## 2018-01-18 DIAGNOSIS — J45.50 SEVERE PERSISTENT ASTHMA, UNSPECIFIED WHETHER COMPLICATED: Primary | ICD-10-CM

## 2018-01-18 PROCEDURE — 96372 THER/PROPH/DIAG INJ SC/IM: CPT | Performed by: INTERNAL MEDICINE

## 2018-01-18 PROCEDURE — 99214 OFFICE O/P EST MOD 30 MIN: CPT | Performed by: INTERNAL MEDICINE

## 2018-01-22 ENCOUNTER — TELEPHONE (OUTPATIENT)
Dept: ORTHOPEDICS | Age: 63
End: 2018-01-22

## 2018-01-22 ENCOUNTER — E-ADVICE (OUTPATIENT)
Dept: ORTHOPEDICS | Age: 63
End: 2018-01-22

## 2018-01-22 ENCOUNTER — OFFICE VISIT (OUTPATIENT)
Dept: CHIROPRACTIC MEDICINE | Age: 63
End: 2018-01-22

## 2018-01-22 DIAGNOSIS — M99.05 PELVIC SOMATIC DYSFUNCTION: Primary | ICD-10-CM

## 2018-01-22 DIAGNOSIS — M54.50 ACUTE RIGHT-SIDED LOW BACK PAIN WITHOUT SCIATICA: ICD-10-CM

## 2018-01-22 DIAGNOSIS — M99.04 SACRAL REGION SOMATIC DYSFUNCTION: ICD-10-CM

## 2018-01-22 DIAGNOSIS — M99.06 SEGMENTAL AND SOMATIC DYSFUNCTION OF LOWER EXTREMITY: ICD-10-CM

## 2018-01-22 DIAGNOSIS — M99.02 THORACIC REGION SOMATIC DYSFUNCTION: ICD-10-CM

## 2018-01-22 DIAGNOSIS — M79.671 RIGHT FOOT PAIN: ICD-10-CM

## 2018-01-22 DIAGNOSIS — M54.6 PAIN IN THORACIC SPINE: ICD-10-CM

## 2018-01-22 DIAGNOSIS — M99.03 LUMBAR REGION SOMATIC DYSFUNCTION: ICD-10-CM

## 2018-01-22 PROCEDURE — 98943 CHIROPRACT MANJ XTRSPINL 1/>: CPT | Performed by: CHIROPRACTOR

## 2018-01-22 PROCEDURE — 98941 CHIROPRACT MANJ 3-4 REGIONS: CPT | Performed by: CHIROPRACTOR

## 2018-01-23 ENCOUNTER — TELEPHONE (OUTPATIENT)
Dept: ORTHOPEDICS | Age: 63
End: 2018-01-23

## 2018-01-23 ENCOUNTER — HOSPITAL ENCOUNTER (OUTPATIENT)
Dept: REHABILITATION | Age: 63
Discharge: STILL ADMITTED OR ADMINISTRATIVE | End: 2018-01-23
Attending: ORTHOPAEDIC SURGERY

## 2018-01-23 DIAGNOSIS — M25.551 RIGHT HIP PAIN: Primary | ICD-10-CM

## 2018-01-23 DIAGNOSIS — M54.50 LUMBAR SPINE PAIN: ICD-10-CM

## 2018-01-23 PROCEDURE — 97799 UNLISTED PHYSCL MED/REHAB PX: CPT | Performed by: PHYSICAL THERAPIST

## 2018-01-23 PROCEDURE — 97530 THERAPEUTIC ACTIVITIES: CPT | Performed by: PHYSICAL THERAPIST

## 2018-01-23 PROCEDURE — 97014 ELECTRIC STIMULATION THERAPY: CPT | Performed by: PHYSICAL THERAPIST

## 2018-01-23 PROCEDURE — 29240 STRAPPING OF SHOULDER: CPT | Performed by: PHYSICAL THERAPIST

## 2018-01-23 PROCEDURE — 10004173 HB COUNTER-THERAPY VISIT PT: Performed by: PHYSICAL THERAPIST

## 2018-01-24 ENCOUNTER — OFFICE VISIT (OUTPATIENT)
Dept: CHIROPRACTIC MEDICINE | Age: 63
End: 2018-01-24

## 2018-01-24 DIAGNOSIS — M99.03 LUMBAR REGION SOMATIC DYSFUNCTION: ICD-10-CM

## 2018-01-24 DIAGNOSIS — M99.06 SEGMENTAL AND SOMATIC DYSFUNCTION OF LOWER EXTREMITY: ICD-10-CM

## 2018-01-24 DIAGNOSIS — M54.50 ACUTE RIGHT-SIDED LOW BACK PAIN WITHOUT SCIATICA: Primary | ICD-10-CM

## 2018-01-24 DIAGNOSIS — M99.02 THORACIC REGION SOMATIC DYSFUNCTION: ICD-10-CM

## 2018-01-24 DIAGNOSIS — M99.04 SACRAL REGION SOMATIC DYSFUNCTION: ICD-10-CM

## 2018-01-24 DIAGNOSIS — M54.6 PAIN IN THORACIC SPINE: ICD-10-CM

## 2018-01-24 DIAGNOSIS — M99.05 PELVIC SOMATIC DYSFUNCTION: ICD-10-CM

## 2018-01-24 DIAGNOSIS — M79.671 RIGHT FOOT PAIN: ICD-10-CM

## 2018-01-24 PROCEDURE — 98941 CHIROPRACT MANJ 3-4 REGIONS: CPT | Performed by: CHIROPRACTOR

## 2018-01-24 PROCEDURE — 98943 CHIROPRACT MANJ XTRSPINL 1/>: CPT | Performed by: CHIROPRACTOR

## 2018-01-26 ENCOUNTER — TELEPHONE (OUTPATIENT)
Dept: ALLERGY | Age: 63
End: 2018-01-26

## 2018-01-26 ENCOUNTER — OFFICE VISIT (OUTPATIENT)
Dept: CHIROPRACTIC MEDICINE | Age: 63
End: 2018-01-26

## 2018-01-26 DIAGNOSIS — M99.05 PELVIC SOMATIC DYSFUNCTION: ICD-10-CM

## 2018-01-26 DIAGNOSIS — M99.04 SACRAL REGION SOMATIC DYSFUNCTION: ICD-10-CM

## 2018-01-26 DIAGNOSIS — M99.03 LUMBAR REGION SOMATIC DYSFUNCTION: ICD-10-CM

## 2018-01-26 DIAGNOSIS — M79.671 RIGHT FOOT PAIN: ICD-10-CM

## 2018-01-26 DIAGNOSIS — M54.50 ACUTE RIGHT-SIDED LOW BACK PAIN WITHOUT SCIATICA: Primary | ICD-10-CM

## 2018-01-26 DIAGNOSIS — M99.06 SEGMENTAL AND SOMATIC DYSFUNCTION OF LOWER EXTREMITY: ICD-10-CM

## 2018-01-26 PROCEDURE — 98943 CHIROPRACT MANJ XTRSPINL 1/>: CPT | Performed by: CHIROPRACTOR

## 2018-01-26 PROCEDURE — 98941 CHIROPRACT MANJ 3-4 REGIONS: CPT | Performed by: CHIROPRACTOR

## 2018-01-26 RX ORDER — BENZONATATE 100 MG/1
100-200 CAPSULE ORAL 3 TIMES DAILY PRN
Qty: 60 CAPSULE | Refills: 0 | Status: SHIPPED | OUTPATIENT
Start: 2018-01-26 | End: 2018-03-06 | Stop reason: SDUPTHER

## 2018-01-28 ENCOUNTER — APPOINTMENT (OUTPATIENT)
Dept: CT IMAGING | Age: 63
DRG: 871 | End: 2018-01-28
Attending: INTERNAL MEDICINE

## 2018-01-28 ENCOUNTER — APPOINTMENT (OUTPATIENT)
Dept: GENERAL RADIOLOGY | Age: 63
DRG: 871 | End: 2018-01-28
Attending: EMERGENCY MEDICINE

## 2018-01-28 ENCOUNTER — HOSPITAL ENCOUNTER (INPATIENT)
Age: 63
LOS: 4 days | Discharge: HOME HEALTH | DRG: 871 | End: 2018-02-01
Attending: EMERGENCY MEDICINE

## 2018-01-28 DIAGNOSIS — J45.909 INTRINSIC ASTHMA: ICD-10-CM

## 2018-01-28 DIAGNOSIS — R68.89 FLU-LIKE SYMPTOMS: Primary | ICD-10-CM

## 2018-01-28 DIAGNOSIS — R09.02 HYPOXIA: ICD-10-CM

## 2018-01-28 DIAGNOSIS — J18.9 PNEUMONIA OF BOTH LUNGS DUE TO INFECTIOUS ORGANISM, UNSPECIFIED PART OF LUNG: ICD-10-CM

## 2018-01-28 DIAGNOSIS — R53.1 WEAKNESS GENERALIZED: ICD-10-CM

## 2018-01-28 LAB
ALBUMIN SERPL-MCNC: 3.2 G/DL (ref 3.6–5.1)
ALBUMIN/GLOB SERPL: 1 {RATIO} (ref 1–2.4)
ALP SERPL-CCNC: 69 UNITS/L (ref 45–117)
ALT SERPL-CCNC: 40 UNITS/L
ANION GAP SERPL CALC-SCNC: 12 MMOL/L (ref 10–20)
APPEARANCE UR: CLEAR
APTT PPP: 23 SEC (ref 22–30)
AST SERPL-CCNC: 37 UNITS/L
ATRIAL RATE (BPM): 111
BACTERIA #/AREA URNS HPF: NORMAL /HPF
BASE DEFICIT BLDA-SCNC: 0 MMOL/L (ref 0–2)
BASOPHILS # BLD AUTO: 0 K/MCL (ref 0–0.3)
BASOPHILS NFR BLD AUTO: 0 %
BILIRUB SERPL-MCNC: 0.6 MG/DL (ref 0.2–1)
BILIRUB UR QL STRIP: NEGATIVE
BUN SERPL-MCNC: 10 MG/DL (ref 6–20)
BUN/CREAT SERPL: 6 (ref 7–25)
CALCIUM SERPL-MCNC: 8.6 MG/DL (ref 8.4–10.2)
CAOX CRY URNS QL MICRO: PRESENT
CHLORIDE SERPL-SCNC: 102 MMOL/L (ref 98–107)
CO2 SERPL-SCNC: 31 MMOL/L (ref 21–32)
COLOR UR: YELLOW
CREAT SERPL-MCNC: 1.58 MG/DL (ref 0.51–0.95)
DIFFERENTIAL METHOD BLD: ABNORMAL
EOSINOPHIL # BLD AUTO: 0 K/MCL (ref 0.1–0.5)
EOSINOPHIL NFR SPEC: 0 %
ERYTHROCYTE [DISTWIDTH] IN BLOOD: 13.6 % (ref 11–15)
FLUAV AG SPEC QL IF: NEGATIVE
FLUBV AG SPEC QL IF: NEGATIVE
GAS FLOW.O2 O2 DELIVERY SYS: 10 LPM
GLOBULIN SER-MCNC: 3.3 G/DL (ref 2–4)
GLUCOSE BLDC GLUCOMTR-MCNC: 153 MG/DL (ref 65–99)
GLUCOSE BLDC GLUCOMTR-MCNC: 181 MG/DL (ref 65–99)
GLUCOSE BLDC GLUCOMTR-MCNC: 226 MG/DL (ref 65–99)
GLUCOSE SERPL-MCNC: 141 MG/DL (ref 65–99)
GLUCOSE UR STRIP-MCNC: NEGATIVE MG/DL
HCO3 BLDA-SCNC: 25 MMOL/L (ref 22–28)
HCT VFR BLD CALC: 37.5 % (ref 36–46.5)
HGB BLD-MCNC: 12.2 G/DL (ref 12–15.5)
HGB UR QL STRIP: ABNORMAL
HOROWITZ INDEX BLDA+IHG-RTO: 90 % (ref 94–98)
HYALINE CASTS #/AREA URNS LPF: NORMAL /LPF (ref 0–5)
INR PPP: 1
KETONES UR STRIP-MCNC: NEGATIVE MG/DL
LACTATE SERPL-SCNC: 1.7 MMOL/L (ref 0–2)
LEUKOCYTE ESTERASE UR QL STRIP: NEGATIVE
LYMPHOCYTES # BLD MANUAL: 0.6 K/MCL (ref 1–4)
LYMPHOCYTES NFR BLD MANUAL: 14 %
MCH RBC QN AUTO: 30.4 PG (ref 26–34)
MCHC RBC AUTO-ENTMCNC: 32.5 G/DL (ref 32–36.5)
MCV RBC AUTO: 93.5 FL (ref 78–100)
MONOCYTES # BLD MANUAL: 0.1 K/MCL (ref 0.3–0.9)
MONOCYTES NFR BLD MANUAL: 3 %
MRSA DNA SPEC QL NAA+PROBE: NOT DETECTED
NEUTROPHILS # BLD: 3.7 K/MCL (ref 1.8–7.7)
NEUTROPHILS NFR BLD AUTO: 83 %
NITRITE UR QL STRIP: NEGATIVE
P AXIS (DEGREES): 52
PCO2 BLDA: 44 MM HG (ref 32–45)
PH BLDA: 7.37 UNITS (ref 7.35–7.45)
PH UR STRIP: 6 UNITS (ref 5–7)
PLATELET # BLD: 212 K/MCL (ref 140–450)
PO2 BLDA: 72 MM HG (ref 83–108)
POTASSIUM SERPL-SCNC: 3.6 MMOL/L (ref 3.4–5.1)
PR-INTERVAL (MSEC): 176
PROCALCITONIN SERPL-MCNC: 0.79 NG/ML
PROT SERPL-MCNC: 6.5 G/DL (ref 6.4–8.2)
PROT UR STRIP-MCNC: NEGATIVE MG/DL
PROTHROMBIN TIME: 10.2 SEC (ref 9.7–11.8)
QRS-INTERVAL (MSEC): 82
QT-INTERVAL (MSEC): 350
QTC: 476
R AXIS (DEGREES): 29
RBC # BLD: 4.01 MIL/MCL (ref 4–5.2)
RBC #/AREA URNS HPF: NORMAL /HPF (ref 0–3)
REPORT TEXT: NORMAL
SAO2 % BLDA: 92 % (ref 95–99)
SODIUM SERPL-SCNC: 141 MMOL/L (ref 135–145)
SP GR UR STRIP: 1.01 (ref 1–1.03)
SPECIMEN SOURCE: ABNORMAL
SPECIMEN SOURCE: NORMAL
SPECIMEN SOURCE: NORMAL
SQUAMOUS #/AREA URNS HPF: NORMAL /HPF (ref 0–5)
T AXIS (DEGREES): 46
TROPONIN I SERPL-MCNC: 0.02 NG/ML
UROBILINOGEN UR STRIP-MCNC: 0.2 MG/DL (ref 0–1)
VENTRICULAR RATE EKG/MIN (BPM): 111
WBC # BLD: 4.5 K/MCL (ref 4.2–11)
WBC #/AREA URNS HPF: NORMAL /HPF (ref 0–5)

## 2018-01-28 PROCEDURE — 71045 X-RAY EXAM CHEST 1 VIEW: CPT | Performed by: RADIOLOGY

## 2018-01-28 PROCEDURE — 87899 AGENT NOS ASSAY W/OPTIC: CPT

## 2018-01-28 PROCEDURE — 10002800 HB RX 250 W HCPCS: Performed by: HOSPITALIST

## 2018-01-28 PROCEDURE — 96365 THER/PROPH/DIAG IV INF INIT: CPT

## 2018-01-28 PROCEDURE — 10002803 HB RX 637: Performed by: HOSPITALIST

## 2018-01-28 PROCEDURE — 10002803 HB RX 637: Performed by: INTERNAL MEDICINE

## 2018-01-28 PROCEDURE — 10002801 HB RX 250 W/O HCPCS: Performed by: INTERNAL MEDICINE

## 2018-01-28 PROCEDURE — 99255 IP/OBS CONSLTJ NEW/EST HI 80: CPT | Performed by: INTERNAL MEDICINE

## 2018-01-28 PROCEDURE — 10000008 HB ROOM CHARGE ICU OR CCU

## 2018-01-28 PROCEDURE — 36415 COLL VENOUS BLD VENIPUNCTURE: CPT

## 2018-01-28 PROCEDURE — 99285 EMERGENCY DEPT VISIT HI MDM: CPT

## 2018-01-28 PROCEDURE — 99291 CRITICAL CARE FIRST HOUR: CPT | Performed by: EMERGENCY MEDICINE

## 2018-01-28 PROCEDURE — 94660 CPAP INITIATION&MGMT: CPT

## 2018-01-28 PROCEDURE — 94640 AIRWAY INHALATION TREATMENT: CPT

## 2018-01-28 PROCEDURE — 10002016 HB COUNTER INCENTIVE SPIROMETRY

## 2018-01-28 PROCEDURE — 82784 ASSAY IGA/IGD/IGG/IGM EACH: CPT

## 2018-01-28 PROCEDURE — 99291 CRITICAL CARE FIRST HOUR: CPT | Performed by: INTERNAL MEDICINE

## 2018-01-28 PROCEDURE — 85730 THROMBOPLASTIN TIME PARTIAL: CPT

## 2018-01-28 PROCEDURE — 10004180 HB COUNTER-TRANSPORT

## 2018-01-28 PROCEDURE — 85025 COMPLETE CBC W/AUTO DIFF WBC: CPT

## 2018-01-28 PROCEDURE — 94667 MNPJ CHEST WALL 1ST: CPT

## 2018-01-28 PROCEDURE — 87040 BLOOD CULTURE FOR BACTERIA: CPT

## 2018-01-28 PROCEDURE — 93005 ELECTROCARDIOGRAM TRACING: CPT | Performed by: EMERGENCY MEDICINE

## 2018-01-28 PROCEDURE — 10002801 HB RX 250 W/O HCPCS: Performed by: HOSPITALIST

## 2018-01-28 PROCEDURE — 82805 BLOOD GASES W/O2 SATURATION: CPT

## 2018-01-28 PROCEDURE — 82962 GLUCOSE BLOOD TEST: CPT

## 2018-01-28 PROCEDURE — 10002807 HB RX 258: Performed by: EMERGENCY MEDICINE

## 2018-01-28 PROCEDURE — 71250 CT THORAX DX C-: CPT

## 2018-01-28 PROCEDURE — 87804 INFLUENZA ASSAY W/OPTIC: CPT

## 2018-01-28 PROCEDURE — 85610 PROTHROMBIN TIME: CPT

## 2018-01-28 PROCEDURE — 87641 MR-STAPH DNA AMP PROBE: CPT

## 2018-01-28 PROCEDURE — 36600 WITHDRAWAL OF ARTERIAL BLOOD: CPT

## 2018-01-28 PROCEDURE — 84484 ASSAY OF TROPONIN QUANT: CPT

## 2018-01-28 PROCEDURE — 10002807 HB RX 258: Performed by: HOSPITALIST

## 2018-01-28 PROCEDURE — 96361 HYDRATE IV INFUSION ADD-ON: CPT

## 2018-01-28 PROCEDURE — 10002801 HB RX 250 W/O HCPCS: Performed by: EMERGENCY MEDICINE

## 2018-01-28 PROCEDURE — 10004651 HB RX, NO CHARGE ITEM: Performed by: HOSPITALIST

## 2018-01-28 PROCEDURE — 81001 URINALYSIS AUTO W/SCOPE: CPT

## 2018-01-28 PROCEDURE — 10002807 HB RX 258: Performed by: INTERNAL MEDICINE

## 2018-01-28 PROCEDURE — 10004125 HB COUNTER-FIRST DAY ADMIT

## 2018-01-28 PROCEDURE — 80053 COMPREHEN METABOLIC PANEL: CPT

## 2018-01-28 PROCEDURE — 99223 1ST HOSP IP/OBS HIGH 75: CPT | Performed by: HOSPITALIST

## 2018-01-28 PROCEDURE — 84145 PROCALCITONIN (PCT): CPT

## 2018-01-28 PROCEDURE — 83605 ASSAY OF LACTIC ACID: CPT

## 2018-01-28 PROCEDURE — 93010 ELECTROCARDIOGRAM REPORT: CPT | Performed by: INTERNAL MEDICINE

## 2018-01-28 PROCEDURE — 71045 X-RAY EXAM CHEST 1 VIEW: CPT

## 2018-01-28 PROCEDURE — 71250 CT THORAX DX C-: CPT | Performed by: RADIOLOGY

## 2018-01-28 RX ORDER — ATORVASTATIN CALCIUM 10 MG/1
10 TABLET, FILM COATED ORAL NIGHTLY
Status: DISCONTINUED | OUTPATIENT
Start: 2018-01-28 | End: 2018-02-01 | Stop reason: HOSPADM

## 2018-01-28 RX ORDER — POTASSIUM CHLORIDE 14.9 MG/ML
40 INJECTION INTRAVENOUS EVERY 4 HOURS PRN
Status: DISCONTINUED | OUTPATIENT
Start: 2018-01-28 | End: 2018-02-01 | Stop reason: HOSPADM

## 2018-01-28 RX ORDER — OSELTAMIVIR PHOSPHATE 30 MG/1
30 CAPSULE ORAL EVERY 12 HOURS SCHEDULED
Status: DISCONTINUED | OUTPATIENT
Start: 2018-01-28 | End: 2018-02-01 | Stop reason: HOSPADM

## 2018-01-28 RX ORDER — POTASSIUM CHLORIDE 20 MEQ/1
40 TABLET, EXTENDED RELEASE ORAL EVERY 4 HOURS PRN
Status: DISCONTINUED | OUTPATIENT
Start: 2018-01-28 | End: 2018-02-01 | Stop reason: HOSPADM

## 2018-01-28 RX ORDER — IPRATROPIUM BROMIDE AND ALBUTEROL SULFATE 2.5; .5 MG/3ML; MG/3ML
3 SOLUTION RESPIRATORY (INHALATION)
Status: DISCONTINUED | OUTPATIENT
Start: 2018-01-28 | End: 2018-01-28

## 2018-01-28 RX ORDER — ACETAMINOPHEN 325 MG/1
650 TABLET ORAL EVERY 4 HOURS PRN
Status: DISCONTINUED | OUTPATIENT
Start: 2018-01-28 | End: 2018-02-01 | Stop reason: HOSPADM

## 2018-01-28 RX ORDER — BUDESONIDE 0.5 MG/2ML
0.5 INHALANT ORAL
Status: DISCONTINUED | OUTPATIENT
Start: 2018-01-28 | End: 2018-02-01

## 2018-01-28 RX ORDER — HEPARIN SODIUM 5000 [USP'U]/ML
5000 INJECTION, SOLUTION INTRAVENOUS; SUBCUTANEOUS EVERY 8 HOURS SCHEDULED
Status: DISCONTINUED | OUTPATIENT
Start: 2018-01-28 | End: 2018-02-01 | Stop reason: HOSPADM

## 2018-01-28 RX ORDER — LANOLIN ALCOHOL/MO/W.PET/CERES
1000 CREAM (GRAM) TOPICAL DAILY
COMMUNITY

## 2018-01-28 RX ORDER — 0.9 % SODIUM CHLORIDE 0.9 %
2 VIAL (ML) INJECTION PRN
Status: DISCONTINUED | OUTPATIENT
Start: 2018-01-28 | End: 2018-01-30

## 2018-01-28 RX ORDER — IPRATROPIUM BROMIDE AND ALBUTEROL SULFATE 2.5; .5 MG/3ML; MG/3ML
3 SOLUTION RESPIRATORY (INHALATION) ONCE
Status: COMPLETED | OUTPATIENT
Start: 2018-01-28 | End: 2018-01-28

## 2018-01-28 RX ORDER — TIOTROPIUM BROMIDE 18 UG/1
18 CAPSULE ORAL; RESPIRATORY (INHALATION)
Status: DISCONTINUED | OUTPATIENT
Start: 2018-01-28 | End: 2018-01-28 | Stop reason: ALTCHOICE

## 2018-01-28 RX ORDER — FORMOTEROL FUMARATE 20 UG/2ML
20 SOLUTION RESPIRATORY (INHALATION)
Status: DISCONTINUED | OUTPATIENT
Start: 2018-01-28 | End: 2018-02-01

## 2018-01-28 RX ORDER — POTASSIUM CHLORIDE 1.5 G/1.58G
20 POWDER, FOR SOLUTION ORAL EVERY 4 HOURS PRN
Status: DISCONTINUED | OUTPATIENT
Start: 2018-01-28 | End: 2018-02-01 | Stop reason: HOSPADM

## 2018-01-28 RX ORDER — NICOTINE POLACRILEX 4 MG
15 LOZENGE BUCCAL PRN
Status: DISCONTINUED | OUTPATIENT
Start: 2018-01-28 | End: 2018-02-01 | Stop reason: HOSPADM

## 2018-01-28 RX ORDER — ALBUTEROL SULFATE 2.5 MG/3ML
2.5 SOLUTION RESPIRATORY (INHALATION)
Status: DISCONTINUED | OUTPATIENT
Start: 2018-01-28 | End: 2018-01-28 | Stop reason: SDUPTHER

## 2018-01-28 RX ORDER — POTASSIUM CHLORIDE 1.5 G/1.58G
40 POWDER, FOR SOLUTION ORAL EVERY 4 HOURS PRN
Status: DISCONTINUED | OUTPATIENT
Start: 2018-01-28 | End: 2018-02-01 | Stop reason: HOSPADM

## 2018-01-28 RX ORDER — 0.9 % SODIUM CHLORIDE 0.9 %
2 VIAL (ML) INJECTION PRN
Status: DISCONTINUED | OUTPATIENT
Start: 2018-01-28 | End: 2018-01-29 | Stop reason: SDUPTHER

## 2018-01-28 RX ORDER — LORAZEPAM 2 MG/ML
0.5 INJECTION INTRAMUSCULAR EVERY 8 HOURS PRN
Status: DISCONTINUED | OUTPATIENT
Start: 2018-01-28 | End: 2018-02-01 | Stop reason: HOSPADM

## 2018-01-28 RX ORDER — DULOXETIN HYDROCHLORIDE 60 MG/1
60 CAPSULE, DELAYED RELEASE ORAL 2 TIMES DAILY
Status: DISCONTINUED | OUTPATIENT
Start: 2018-01-28 | End: 2018-02-01 | Stop reason: HOSPADM

## 2018-01-28 RX ORDER — 0.9 % SODIUM CHLORIDE 0.9 %
2 VIAL (ML) INJECTION EVERY 12 HOURS SCHEDULED
Status: DISCONTINUED | OUTPATIENT
Start: 2018-01-28 | End: 2018-01-30

## 2018-01-28 RX ORDER — GABAPENTIN 300 MG/1
300 CAPSULE ORAL 2 TIMES DAILY
Status: DISCONTINUED | OUTPATIENT
Start: 2018-01-28 | End: 2018-01-28 | Stop reason: DRUGHIGH

## 2018-01-28 RX ORDER — FLUTICASONE PROPIONATE AND SALMETEROL 500; 50 UG/1; UG/1
2 POWDER RESPIRATORY (INHALATION)
Status: DISCONTINUED | OUTPATIENT
Start: 2018-01-28 | End: 2018-01-28

## 2018-01-28 RX ORDER — BUSPIRONE HYDROCHLORIDE 15 MG/1
30 TABLET ORAL 2 TIMES DAILY
Status: DISCONTINUED | OUTPATIENT
Start: 2018-01-28 | End: 2018-02-01 | Stop reason: HOSPADM

## 2018-01-28 RX ORDER — DEXTROSE MONOHYDRATE 50 MG/ML
INJECTION, SOLUTION INTRAVENOUS CONTINUOUS PRN
Status: DISCONTINUED | OUTPATIENT
Start: 2018-01-28 | End: 2018-02-01 | Stop reason: HOSPADM

## 2018-01-28 RX ORDER — AZITHROMYCIN 250 MG/1
500 TABLET, FILM COATED ORAL DAILY
Status: DISCONTINUED | OUTPATIENT
Start: 2018-01-28 | End: 2018-01-31

## 2018-01-28 RX ORDER — DEXTROSE MONOHYDRATE 25 G/50ML
25 INJECTION, SOLUTION INTRAVENOUS PRN
Status: DISCONTINUED | OUTPATIENT
Start: 2018-01-28 | End: 2018-02-01 | Stop reason: HOSPADM

## 2018-01-28 RX ORDER — ALBUTEROL SULFATE 90 UG/1
2 AEROSOL, METERED RESPIRATORY (INHALATION) EVERY 4 HOURS PRN
Status: DISCONTINUED | OUTPATIENT
Start: 2018-01-28 | End: 2018-02-01 | Stop reason: HOSPADM

## 2018-01-28 RX ORDER — SODIUM CHLORIDE 9 MG/ML
INJECTION, SOLUTION INTRAVENOUS CONTINUOUS
Status: DISCONTINUED | OUTPATIENT
Start: 2018-01-28 | End: 2018-01-29

## 2018-01-28 RX ORDER — TRAMADOL HYDROCHLORIDE 50 MG/1
50 TABLET ORAL EVERY 6 HOURS PRN
Status: DISCONTINUED | OUTPATIENT
Start: 2018-01-28 | End: 2018-01-29

## 2018-01-28 RX ORDER — DIPHENHYDRAMINE HCL 25 MG
50 CAPSULE ORAL NIGHTLY PRN
COMMUNITY

## 2018-01-28 RX ORDER — GABAPENTIN 300 MG/1
300 CAPSULE ORAL NIGHTLY
Status: DISCONTINUED | OUTPATIENT
Start: 2018-01-28 | End: 2018-02-01 | Stop reason: HOSPADM

## 2018-01-28 RX ORDER — METHYLPREDNISOLONE SODIUM SUCCINATE 125 MG/2ML
60 INJECTION, POWDER, LYOPHILIZED, FOR SOLUTION INTRAMUSCULAR; INTRAVENOUS EVERY 6 HOURS SCHEDULED
Status: DISCONTINUED | OUTPATIENT
Start: 2018-01-28 | End: 2018-01-31

## 2018-01-28 RX ORDER — ONDANSETRON 2 MG/ML
4 INJECTION INTRAMUSCULAR; INTRAVENOUS EVERY 12 HOURS PRN
Status: DISCONTINUED | OUTPATIENT
Start: 2018-01-28 | End: 2018-02-01 | Stop reason: HOSPADM

## 2018-01-28 RX ORDER — POTASSIUM CHLORIDE 20 MEQ/1
20 TABLET, EXTENDED RELEASE ORAL EVERY 4 HOURS PRN
Status: DISCONTINUED | OUTPATIENT
Start: 2018-01-28 | End: 2018-02-01 | Stop reason: HOSPADM

## 2018-01-28 RX ORDER — FERROUS SULFATE 325(65) MG
325 TABLET ORAL 2 TIMES DAILY WITH MEALS
Status: DISCONTINUED | OUTPATIENT
Start: 2018-01-28 | End: 2018-02-01 | Stop reason: HOSPADM

## 2018-01-28 RX ORDER — POTASSIUM CHLORIDE 14.9 MG/ML
20 INJECTION INTRAVENOUS EVERY 4 HOURS PRN
Status: DISCONTINUED | OUTPATIENT
Start: 2018-01-28 | End: 2018-02-01 | Stop reason: HOSPADM

## 2018-01-28 RX ORDER — MAGNESIUM SULFATE 1 G/100ML
1 INJECTION INTRAVENOUS DAILY PRN
Status: DISCONTINUED | OUTPATIENT
Start: 2018-01-28 | End: 2018-02-01 | Stop reason: HOSPADM

## 2018-01-28 RX ORDER — BUPROPION HYDROCHLORIDE 150 MG/1
450 TABLET ORAL DAILY
Status: DISCONTINUED | OUTPATIENT
Start: 2018-01-29 | End: 2018-02-01 | Stop reason: HOSPADM

## 2018-01-28 RX ORDER — NITROGLYCERIN 0.4 MG/1
0.4 TABLET SUBLINGUAL EVERY 5 MIN PRN
Status: DISCONTINUED | OUTPATIENT
Start: 2018-01-28 | End: 2018-01-30

## 2018-01-28 RX ORDER — BUPROPION HYDROCHLORIDE 150 MG/1
300 TABLET ORAL DAILY
Status: DISCONTINUED | OUTPATIENT
Start: 2018-01-28 | End: 2018-01-28 | Stop reason: DRUGHIGH

## 2018-01-28 RX ORDER — ACETYLCYSTEINE 100 MG/ML
200 SOLUTION ORAL; RESPIRATORY (INHALATION)
Status: COMPLETED | OUTPATIENT
Start: 2018-01-28 | End: 2018-01-30

## 2018-01-28 RX ORDER — ALBUTEROL SULFATE 2.5 MG/3ML
2.5 SOLUTION RESPIRATORY (INHALATION)
Status: DISCONTINUED | OUTPATIENT
Start: 2018-01-28 | End: 2018-02-01 | Stop reason: HOSPADM

## 2018-01-28 RX ORDER — 0.9 % SODIUM CHLORIDE 0.9 %
2 VIAL (ML) INJECTION EVERY 12 HOURS SCHEDULED
Status: DISCONTINUED | OUTPATIENT
Start: 2018-01-28 | End: 2018-01-29 | Stop reason: SDUPTHER

## 2018-01-28 RX ORDER — LEVOTHYROXINE SODIUM 0.05 MG/1
50 TABLET ORAL DAILY
Status: DISCONTINUED | OUTPATIENT
Start: 2018-01-29 | End: 2018-02-01 | Stop reason: HOSPADM

## 2018-01-28 RX ADMIN — BUDESONIDE 0.5 MG: 0.5 INHALANT RESPIRATORY (INHALATION) at 19:08

## 2018-01-28 RX ADMIN — Medication 1000 MG: at 16:03

## 2018-01-28 RX ADMIN — INSULIN LISPRO 4 UNITS: 100 INJECTION, SOLUTION INTRAVENOUS; SUBCUTANEOUS at 22:56

## 2018-01-28 RX ADMIN — ALBUTEROL SULFATE 2.5 MG: 2.5 SOLUTION RESPIRATORY (INHALATION) at 19:08

## 2018-01-28 RX ADMIN — TRAMADOL HYDROCHLORIDE 50 MG: 50 TABLET, FILM COATED ORAL at 17:45

## 2018-01-28 RX ADMIN — METHYLPREDNISOLONE SODIUM SUCCINATE 60 MG: 125 INJECTION, POWDER, FOR SOLUTION INTRAMUSCULAR; INTRAVENOUS at 20:49

## 2018-01-28 RX ADMIN — GUAIFENESIN AND DEXTROMETHORPHAN HYDROBROMIDE 2 TABLET: 600; 30 TABLET, EXTENDED RELEASE ORAL at 20:52

## 2018-01-28 RX ADMIN — IPRATROPIUM BROMIDE AND ALBUTEROL SULFATE 3 ML: .5; 3 SOLUTION RESPIRATORY (INHALATION) at 10:13

## 2018-01-28 RX ADMIN — FORMOTEROL FUMARATE DIHYDRATE 20 MCG: 20 SOLUTION RESPIRATORY (INHALATION) at 19:08

## 2018-01-28 RX ADMIN — OSELTAMIVIR PHOSPHATE 30 MG: 30 CAPSULE ORAL at 16:04

## 2018-01-28 RX ADMIN — ACETAMINOPHEN 650 MG: 325 TABLET ORAL at 19:42

## 2018-01-28 RX ADMIN — BUSPIRONE HYDROCHLORIDE 30 MG: 15 TABLET ORAL at 17:36

## 2018-01-28 RX ADMIN — HEPARIN SODIUM 5000 UNITS: 5000 INJECTION, SOLUTION INTRAVENOUS; SUBCUTANEOUS at 23:59

## 2018-01-28 RX ADMIN — SODIUM CHLORIDE, PRESERVATIVE FREE 2 ML: 5 INJECTION INTRAVENOUS at 21:03

## 2018-01-28 RX ADMIN — SODIUM CHLORIDE 1000 ML: 9 INJECTION, SOLUTION INTRAVENOUS at 11:57

## 2018-01-28 RX ADMIN — HEPARIN SODIUM 5000 UNITS: 5000 INJECTION, SOLUTION INTRAVENOUS; SUBCUTANEOUS at 17:36

## 2018-01-28 RX ADMIN — METHYLPREDNISOLONE SODIUM SUCCINATE 60 MG: 125 INJECTION, POWDER, FOR SOLUTION INTRAMUSCULAR; INTRAVENOUS at 16:03

## 2018-01-28 RX ADMIN — DOXYCYCLINE 100 MG: 100 INJECTION, POWDER, LYOPHILIZED, FOR SOLUTION INTRAVENOUS at 12:25

## 2018-01-28 RX ADMIN — DOXYCYCLINE 100 MG: 100 INJECTION, POWDER, LYOPHILIZED, FOR SOLUTION INTRAVENOUS at 20:50

## 2018-01-28 RX ADMIN — ACETYLCYSTEINE 200 MG: 100 INHALANT RESPIRATORY (INHALATION) at 19:08

## 2018-01-28 RX ADMIN — ATORVASTATIN CALCIUM 10 MG: 10 TABLET, FILM COATED ORAL at 20:51

## 2018-01-28 RX ADMIN — SODIUM CHLORIDE, PRESERVATIVE FREE 2 ML: 5 INJECTION INTRAVENOUS at 15:00

## 2018-01-28 RX ADMIN — SODIUM CHLORIDE, PRESERVATIVE FREE 2 ML: 5 INJECTION INTRAVENOUS at 21:05

## 2018-01-28 RX ADMIN — Medication 1000 MG: at 22:52

## 2018-01-28 RX ADMIN — SODIUM CHLORIDE 1000 ML: 9 INJECTION, SOLUTION INTRAVENOUS at 18:50

## 2018-01-28 RX ADMIN — SODIUM CHLORIDE: 9 INJECTION, SOLUTION INTRAVENOUS at 14:23

## 2018-01-28 RX ADMIN — SODIUM CHLORIDE 1000 ML: 9 INJECTION, SOLUTION INTRAVENOUS at 10:39

## 2018-01-28 RX ADMIN — DULOXETINE 60 MG: 60 CAPSULE, DELAYED RELEASE ORAL at 17:36

## 2018-01-28 RX ADMIN — GABAPENTIN 300 MG: 300 CAPSULE ORAL at 20:51

## 2018-01-28 RX ADMIN — AZITHROMYCIN 500 MG: 250 TABLET, FILM COATED ORAL at 16:03

## 2018-01-28 ASSESSMENT — ENCOUNTER SYMPTOMS
WOUND: 0
NERVOUS/ANXIOUS: 0
WEAKNESS: 0
CONFUSION: 0
FEVER: 1
NUMBNESS: 0
PHOTOPHOBIA: 0
DIARRHEA: 0
LIGHT-HEADEDNESS: 0
DIZZINESS: 0
CHILLS: 1
ACTIVITY CHANGE: 1
COLOR CHANGE: 0
APPETITE CHANGE: 1
BACK PAIN: 0
ABDOMINAL PAIN: 0
HEADACHES: 0
SINUS PRESSURE: 0
SORE THROAT: 0
COUGH: 1
SHORTNESS OF BREATH: 1
FATIGUE: 1
ADENOPATHY: 0
NAUSEA: 0
BRUISES/BLEEDS EASILY: 0
RHINORRHEA: 1
VOMITING: 0
CHEST TIGHTNESS: 0

## 2018-01-28 ASSESSMENT — ACTIVITIES OF DAILY LIVING (ADL)
RECENT_DECLINE_ADL: NO
CHRONIC_PAIN_PRESENT: NO
ADL_SHORT_OF_BREATH: NO
ADL_BEFORE_ADMISSION: INDEPENDENT
ADL_SCORE: 12

## 2018-01-28 ASSESSMENT — LIFESTYLE VARIABLES
HOW MANY STANDARD DRINKS CONTAINING ALCOHOL DO YOU HAVE ON A TYPICAL DAY: 0,1 OR 2
HOW OFTEN DO YOU HAVE A DRINK CONTAINING ALCOHOL: MONTHLY OR LESS
ALCOHOL_USE_STATUS: NO OR LOW RISK WITH VALIDATED TOOL
HOW OFTEN DO YOU HAVE 6 OR MORE DRINKS ON ONE OCCASION: NEVER
E-CIGARETTE_USE: NO E-CIGARETTES USE IN THE LAST 30 DAYS
AUDIT-C TOTAL SCORE: 1

## 2018-01-28 ASSESSMENT — PAIN SCALES - GENERAL: PAIN_LEVEL_AT_REST: 10

## 2018-01-29 ENCOUNTER — TELEPHONE (OUTPATIENT)
Dept: ALLERGY | Age: 63
End: 2018-01-29

## 2018-01-29 ENCOUNTER — TELEPHONE (OUTPATIENT)
Dept: CHIROPRACTIC MEDICINE | Age: 63
End: 2018-01-29

## 2018-01-29 LAB
ALBUMIN SERPL-MCNC: 2.7 G/DL (ref 3.6–5.1)
ALBUMIN/GLOB SERPL: 0.8 {RATIO} (ref 1–2.4)
ALP SERPL-CCNC: 38 UNITS/L (ref 45–117)
ALT SERPL-CCNC: 41 UNITS/L
ANION GAP SERPL CALC-SCNC: 15 MMOL/L (ref 10–20)
AST SERPL-CCNC: 37 UNITS/L
BASOPHILS # BLD: 0 K/MCL (ref 0–0.3)
BASOPHILS NFR BLD: 0 %
BILIRUB SERPL-MCNC: 0.6 MG/DL (ref 0.2–1)
BNP SERPL-MCNC: 516 PG/ML
BUN SERPL-MCNC: 15 MG/DL (ref 6–20)
BUN/CREAT SERPL: 11 (ref 7–25)
CALCIUM SERPL-MCNC: 8.4 MG/DL (ref 8.4–10.2)
CHLORIDE SERPL-SCNC: 105 MMOL/L (ref 98–107)
CO2 SERPL-SCNC: 25 MMOL/L (ref 21–32)
CREAT SERPL-MCNC: 1.39 MG/DL (ref 0.51–0.95)
DIFFERENTIAL METHOD BLD: ABNORMAL
EOSINOPHIL # BLD: 0 K/MCL (ref 0.1–0.5)
EOSINOPHIL NFR BLD: 0 %
ERYTHROCYTE [DISTWIDTH] IN BLOOD: 13.5 % (ref 11–15)
GLOBULIN SER-MCNC: 3.3 G/DL (ref 2–4)
GLUCOSE BLDC GLUCOMTR-MCNC: 146 MG/DL (ref 65–99)
GLUCOSE BLDC GLUCOMTR-MCNC: 166 MG/DL (ref 65–99)
GLUCOSE BLDC GLUCOMTR-MCNC: 191 MG/DL (ref 65–99)
GLUCOSE BLDC GLUCOMTR-MCNC: 254 MG/DL (ref 65–99)
GLUCOSE SERPL-MCNC: 195 MG/DL (ref 65–99)
HCT VFR BLD CALC: 34.2 % (ref 36–46.5)
HGB BLD-MCNC: 11.2 G/DL (ref 12–15.5)
IGG SERPL-MCNC: 710 MG/DL (ref 751–1560)
LYMPHOCYTES # BLD: 0.5 K/MCL (ref 1–4)
LYMPHOCYTES NFR BLD: 3 %
M TB CMPLX DNA SPEC QL NAA+PROBE: NORMAL
M TB CMPLX DNA SPEC QL NAA+PROBE: NORMAL
MAGNESIUM SERPL-MCNC: 1.7 MG/DL (ref 1.7–2.4)
MCH RBC QN AUTO: 30.4 PG (ref 26–34)
MCHC RBC AUTO-ENTMCNC: 32.7 G/DL (ref 32–36.5)
MCV RBC AUTO: 92.7 FL (ref 78–100)
METAMYELOCYTES NFR BLD: 2 % (ref 0–2)
MONOCYTES # BLD: 0.3 K/MCL (ref 0.3–0.9)
MONOCYTES NFR BLD: 2 %
NEUTROPHILS # BLD: 14.6 K/MCL (ref 1.8–7.7)
NEUTS BAND NFR BLD: 4 % (ref 0–10)
NEUTS SEG NFR BLD: 89 %
PLAT MORPH BLD: NORMAL
PLATELET # BLD: 193 K/MCL (ref 140–450)
POTASSIUM SERPL-SCNC: 4.6 MMOL/L (ref 3.4–5.1)
PROT SERPL-MCNC: 6 G/DL (ref 6.4–8.2)
RBC # BLD: 3.69 MIL/MCL (ref 4–5.2)
RBC MORPH BLD: NORMAL
REPORT STATUS (RPT): NORMAL
REPORT STATUS (RPT): NORMAL
SODIUM SERPL-SCNC: 140 MMOL/L (ref 135–145)
SPECIMEN SOURCE: NORMAL
SPECIMEN SOURCE: NORMAL
TSH SERPL-ACNC: 0.52 MCUNITS/ML (ref 0.35–5)
WBC # BLD: 15.7 K/MCL (ref 4.2–11)
WBC MORPH BLD: NORMAL

## 2018-01-29 PROCEDURE — 10002807 HB RX 258: Performed by: INTERNAL MEDICINE

## 2018-01-29 PROCEDURE — 10002803 HB RX 637: Performed by: HOSPITALIST

## 2018-01-29 PROCEDURE — 83735 ASSAY OF MAGNESIUM: CPT

## 2018-01-29 PROCEDURE — 10002801 HB RX 250 W/O HCPCS: Performed by: INTERNAL MEDICINE

## 2018-01-29 PROCEDURE — 80053 COMPREHEN METABOLIC PANEL: CPT

## 2018-01-29 PROCEDURE — 97535 SELF CARE MNGMENT TRAINING: CPT

## 2018-01-29 PROCEDURE — 10004172 HB COUNTER-THERAPY VISIT OT

## 2018-01-29 PROCEDURE — 10002803 HB RX 637: Performed by: INTERNAL MEDICINE

## 2018-01-29 PROCEDURE — 10000002 HB ROOM CHARGE MED SURG

## 2018-01-29 PROCEDURE — 99232 SBSQ HOSP IP/OBS MODERATE 35: CPT | Performed by: HOSPITALIST

## 2018-01-29 PROCEDURE — 84443 ASSAY THYROID STIM HORMONE: CPT

## 2018-01-29 PROCEDURE — 10002801 HB RX 250 W/O HCPCS: Performed by: HOSPITALIST

## 2018-01-29 PROCEDURE — 83880 ASSAY OF NATRIURETIC PEPTIDE: CPT

## 2018-01-29 PROCEDURE — 85025 COMPLETE CBC W/AUTO DIFF WBC: CPT

## 2018-01-29 PROCEDURE — 10002800 HB RX 250 W HCPCS: Performed by: HOSPITALIST

## 2018-01-29 PROCEDURE — 94667 MNPJ CHEST WALL 1ST: CPT

## 2018-01-29 PROCEDURE — 97161 PT EVAL LOW COMPLEX 20 MIN: CPT

## 2018-01-29 PROCEDURE — 82962 GLUCOSE BLOOD TEST: CPT

## 2018-01-29 PROCEDURE — 10004173 HB COUNTER-THERAPY VISIT PT

## 2018-01-29 PROCEDURE — 10003445 HB TELEMETRY PER DAY

## 2018-01-29 PROCEDURE — 87205 SMEAR GRAM STAIN: CPT

## 2018-01-29 PROCEDURE — 10004651 HB RX, NO CHARGE ITEM: Performed by: HOSPITALIST

## 2018-01-29 PROCEDURE — 94668 MNPJ CHEST WALL SBSQ: CPT

## 2018-01-29 PROCEDURE — 94660 CPAP INITIATION&MGMT: CPT

## 2018-01-29 PROCEDURE — 10002807 HB RX 258: Performed by: HOSPITALIST

## 2018-01-29 PROCEDURE — 87077 CULTURE AEROBIC IDENTIFY: CPT

## 2018-01-29 PROCEDURE — 97530 THERAPEUTIC ACTIVITIES: CPT

## 2018-01-29 PROCEDURE — 87633 RESP VIRUS 12-25 TARGETS: CPT

## 2018-01-29 PROCEDURE — 99233 SBSQ HOSP IP/OBS HIGH 50: CPT | Performed by: INTERNAL MEDICINE

## 2018-01-29 PROCEDURE — 94640 AIRWAY INHALATION TREATMENT: CPT

## 2018-01-29 PROCEDURE — 97165 OT EVAL LOW COMPLEX 30 MIN: CPT

## 2018-01-29 RX ORDER — FUROSEMIDE 40 MG/1
40 TABLET ORAL DAILY
Status: DISCONTINUED | OUTPATIENT
Start: 2018-01-29 | End: 2018-02-01 | Stop reason: HOSPADM

## 2018-01-29 RX ORDER — HYDROCODONE BITARTRATE AND ACETAMINOPHEN 5; 325 MG/1; MG/1
1-2 TABLET ORAL EVERY 4 HOURS PRN
Status: DISCONTINUED | OUTPATIENT
Start: 2018-01-29 | End: 2018-02-01 | Stop reason: HOSPADM

## 2018-01-29 RX ORDER — TIZANIDINE 2 MG/1
2 TABLET ORAL EVERY 8 HOURS PRN
Status: DISCONTINUED | OUTPATIENT
Start: 2018-01-29 | End: 2018-02-01 | Stop reason: HOSPADM

## 2018-01-29 RX ORDER — TRAMADOL HYDROCHLORIDE 50 MG/1
50 TABLET ORAL EVERY 6 HOURS PRN
Status: DISCONTINUED | OUTPATIENT
Start: 2018-01-29 | End: 2018-02-01 | Stop reason: HOSPADM

## 2018-01-29 RX ORDER — CLONAZEPAM 1 MG/1
2 TABLET ORAL NIGHTLY PRN
Status: DISCONTINUED | OUTPATIENT
Start: 2018-01-29 | End: 2018-02-01 | Stop reason: HOSPADM

## 2018-01-29 RX ADMIN — Medication 1000 MG: at 21:01

## 2018-01-29 RX ADMIN — METHYLPREDNISOLONE SODIUM SUCCINATE 60 MG: 125 INJECTION, POWDER, FOR SOLUTION INTRAMUSCULAR; INTRAVENOUS at 17:09

## 2018-01-29 RX ADMIN — METHYLPREDNISOLONE SODIUM SUCCINATE 60 MG: 125 INJECTION, POWDER, FOR SOLUTION INTRAMUSCULAR; INTRAVENOUS at 10:15

## 2018-01-29 RX ADMIN — FERROUS SULFATE TAB 325 MG (65 MG ELEMENTAL FE) 325 MG: 325 (65 FE) TAB at 17:06

## 2018-01-29 RX ADMIN — AZITHROMYCIN 500 MG: 250 TABLET, FILM COATED ORAL at 09:57

## 2018-01-29 RX ADMIN — DULOXETINE 60 MG: 60 CAPSULE, DELAYED RELEASE ORAL at 17:06

## 2018-01-29 RX ADMIN — TRAMADOL HYDROCHLORIDE 50 MG: 50 TABLET, FILM COATED ORAL at 17:08

## 2018-01-29 RX ADMIN — DOXYCYCLINE 100 MG: 100 INJECTION, POWDER, LYOPHILIZED, FOR SOLUTION INTRAVENOUS at 09:57

## 2018-01-29 RX ADMIN — FUROSEMIDE 40 MG: 40 TABLET ORAL at 11:33

## 2018-01-29 RX ADMIN — GUAIFENESIN AND DEXTROMETHORPHAN HYDROBROMIDE 2 TABLET: 600; 30 TABLET, EXTENDED RELEASE ORAL at 09:57

## 2018-01-29 RX ADMIN — Medication 1000 MG: at 15:06

## 2018-01-29 RX ADMIN — TRAMADOL HYDROCHLORIDE 50 MG: 50 TABLET, FILM COATED ORAL at 04:44

## 2018-01-29 RX ADMIN — SODIUM CHLORIDE: 9 INJECTION, SOLUTION INTRAVENOUS at 01:06

## 2018-01-29 RX ADMIN — BUDESONIDE 0.5 MG: 0.5 INHALANT RESPIRATORY (INHALATION) at 18:25

## 2018-01-29 RX ADMIN — ACETYLCYSTEINE 200 MG: 100 INHALANT RESPIRATORY (INHALATION) at 10:35

## 2018-01-29 RX ADMIN — METHYLPREDNISOLONE SODIUM SUCCINATE 60 MG: 125 INJECTION, POWDER, FOR SOLUTION INTRAMUSCULAR; INTRAVENOUS at 02:00

## 2018-01-29 RX ADMIN — HEPARIN SODIUM 5000 UNITS: 5000 INJECTION, SOLUTION INTRAVENOUS; SUBCUTANEOUS at 10:15

## 2018-01-29 RX ADMIN — LEVOTHYROXINE SODIUM 50 MCG: 50 TABLET ORAL at 09:57

## 2018-01-29 RX ADMIN — ACETYLCYSTEINE 200 MG: 100 INHALANT RESPIRATORY (INHALATION) at 15:24

## 2018-01-29 RX ADMIN — TRAMADOL HYDROCHLORIDE 50 MG: 50 TABLET, FILM COATED ORAL at 11:33

## 2018-01-29 RX ADMIN — ALBUTEROL SULFATE 2.5 MG: 2.5 SOLUTION RESPIRATORY (INHALATION) at 15:23

## 2018-01-29 RX ADMIN — FORMOTEROL FUMARATE DIHYDRATE 20 MCG: 20 SOLUTION RESPIRATORY (INHALATION) at 06:25

## 2018-01-29 RX ADMIN — ACETYLCYSTEINE 200 MG: 100 INHALANT RESPIRATORY (INHALATION) at 06:25

## 2018-01-29 RX ADMIN — GABAPENTIN 300 MG: 300 CAPSULE ORAL at 20:57

## 2018-01-29 RX ADMIN — ACETAMINOPHEN AND CODEINE PHOSPHATE 1 TABLET: 300; 30 TABLET ORAL at 20:57

## 2018-01-29 RX ADMIN — SODIUM CHLORIDE, PRESERVATIVE FREE 2 ML: 5 INJECTION INTRAVENOUS at 20:58

## 2018-01-29 RX ADMIN — BUPROPION HYDROCHLORIDE 450 MG: 150 TABLET, FILM COATED, EXTENDED RELEASE ORAL at 09:57

## 2018-01-29 RX ADMIN — BUSPIRONE HYDROCHLORIDE 30 MG: 15 TABLET ORAL at 17:06

## 2018-01-29 RX ADMIN — ALBUTEROL SULFATE 2.5 MG: 2.5 SOLUTION RESPIRATORY (INHALATION) at 10:35

## 2018-01-29 RX ADMIN — ACETYLCYSTEINE 200 MG: 100 INHALANT RESPIRATORY (INHALATION) at 18:25

## 2018-01-29 RX ADMIN — ALBUTEROL SULFATE 2.5 MG: 2.5 SOLUTION RESPIRATORY (INHALATION) at 18:25

## 2018-01-29 RX ADMIN — ACETAMINOPHEN AND CODEINE PHOSPHATE 1 TABLET: 300; 30 TABLET ORAL at 15:14

## 2018-01-29 RX ADMIN — GUAIFENESIN AND DEXTROMETHORPHAN HYDROBROMIDE 2 TABLET: 600; 30 TABLET, EXTENDED RELEASE ORAL at 20:57

## 2018-01-29 RX ADMIN — ALBUTEROL SULFATE 2.5 MG: 2.5 SOLUTION RESPIRATORY (INHALATION) at 06:25

## 2018-01-29 RX ADMIN — OSELTAMIVIR PHOSPHATE 30 MG: 30 CAPSULE ORAL at 09:57

## 2018-01-29 RX ADMIN — INSULIN LISPRO 2 UNITS: 100 INJECTION, SOLUTION INTRAVENOUS; SUBCUTANEOUS at 18:59

## 2018-01-29 RX ADMIN — MAGNESIUM SULFATE HEPTAHYDRATE 1 G: 1 INJECTION, SOLUTION INTRAVENOUS at 06:50

## 2018-01-29 RX ADMIN — BUDESONIDE 0.5 MG: 0.5 INHALANT RESPIRATORY (INHALATION) at 06:25

## 2018-01-29 RX ADMIN — TIZANIDINE 2 MG: 2 TABLET ORAL at 13:20

## 2018-01-29 RX ADMIN — INSULIN LISPRO 2 UNITS: 100 INJECTION, SOLUTION INTRAVENOUS; SUBCUTANEOUS at 09:57

## 2018-01-29 RX ADMIN — HEPARIN SODIUM 5000 UNITS: 5000 INJECTION, SOLUTION INTRAVENOUS; SUBCUTANEOUS at 14:48

## 2018-01-29 RX ADMIN — METHYLPREDNISOLONE SODIUM SUCCINATE 60 MG: 125 INJECTION, POWDER, FOR SOLUTION INTRAMUSCULAR; INTRAVENOUS at 14:48

## 2018-01-29 RX ADMIN — ATORVASTATIN CALCIUM 10 MG: 10 TABLET, FILM COATED ORAL at 20:57

## 2018-01-29 RX ADMIN — INSULIN LISPRO 6 UNITS: 100 INJECTION, SOLUTION INTRAVENOUS; SUBCUTANEOUS at 21:00

## 2018-01-29 RX ADMIN — BUSPIRONE HYDROCHLORIDE 30 MG: 15 TABLET ORAL at 09:57

## 2018-01-29 RX ADMIN — OSELTAMIVIR PHOSPHATE 30 MG: 30 CAPSULE ORAL at 20:57

## 2018-01-29 RX ADMIN — HEPARIN SODIUM 5000 UNITS: 5000 INJECTION, SOLUTION INTRAVENOUS; SUBCUTANEOUS at 21:01

## 2018-01-29 RX ADMIN — Medication 1000 MG: at 05:35

## 2018-01-29 RX ADMIN — DULOXETINE 60 MG: 60 CAPSULE, DELAYED RELEASE ORAL at 10:15

## 2018-01-29 RX ADMIN — FORMOTEROL FUMARATE DIHYDRATE 20 MCG: 20 SOLUTION RESPIRATORY (INHALATION) at 18:25

## 2018-01-29 ASSESSMENT — PAIN SCALES - GENERAL
PAIN_LEVEL_AT_REST: 3
PAIN_LEVEL_AT_REST: 5
PAIN_LEVEL_AT_REST: 8
PAIN_LEVEL_AT_REST: 8
PAIN_LEVEL_AT_REST: 3
PAIN_LEVEL_AT_REST: 7
PAIN_LEVEL_AT_REST: 5
PAIN_LEVEL_AT_REST: 4
PAIN_LEVEL_AT_REST: 0
PAIN_LEVEL_AT_REST: 5
PAIN_LEVEL_AT_REST: 4
PAIN_LEVEL_AT_REST: 4
PAIN_LEVEL_AT_REST: 1

## 2018-01-29 ASSESSMENT — ACTIVITIES OF DAILY LIVING (ADL): PRIOR_ADL_BATHING: MODIFIED INDEPENDENT

## 2018-01-30 LAB
C PNEUM DNA SPEC QL NAA+PROBE: NOT DETECTED
FLUAV H1 2009 PAND RNA NPH QL NAA+PROBE: NOT DETECTED
FLUAV H1 RNA NPH QL NAA+PROBE: NOT DETECTED
FLUAV H3 RNA NPH QL NAA+PROBE: NOT DETECTED
FLUAV RNA NPH QL NAA+PROBE: NOT DETECTED
FLUBV RNA NPH QL NAA+PROBE: NOT DETECTED
GLUCOSE BLDC GLUCOMTR-MCNC: 174 MG/DL (ref 65–99)
GLUCOSE BLDC GLUCOMTR-MCNC: 175 MG/DL (ref 65–99)
GLUCOSE BLDC GLUCOMTR-MCNC: 216 MG/DL (ref 65–99)
GLUCOSE BLDC GLUCOMTR-MCNC: 227 MG/DL (ref 65–99)
HADV DNA NPH QL NAA+PROBE: NOT DETECTED
HBOV DNA SPEC QL NAA+PROBE: NOT DETECTED
HCOV 229E RNA SPEC QL NAA+PROBE: NOT DETECTED
HCOV HKU1 RNA SPEC QL NAA+PROBE: NOT DETECTED
HCOV NL63 RNA SPEC QL NAA+PROBE: NOT DETECTED
HCOV OC43 RNA SPEC QL NAA+PROBE: NOT DETECTED
HMPV RNA NPH QL NAA+PROBE: NOT DETECTED
HPIV1 RNA NPH QL NAA+PROBE: NOT DETECTED
HPIV2 RNA NPH QL NAA+PROBE: NOT DETECTED
HPIV3 RNA NPH QL NAA+PROBE: NOT DETECTED
HPIV4 RNA NPH QL NAA+PROBE: NOT DETECTED
M PNEUMO DNA SPEC QL NAA+PROBE: NOT DETECTED
MAGNESIUM SERPL-MCNC: 2 MG/DL (ref 1.7–2.4)
RSV A RNA NPH QL NAA+PROBE: NOT DETECTED
RSV B RNA NPH QL NAA+PROBE: NOT DETECTED
RV+EV RNA SPEC QL NAA+PROBE: NOT DETECTED
SPECIMEN SOURCE: NORMAL

## 2018-01-30 PROCEDURE — 97535 SELF CARE MNGMENT TRAINING: CPT

## 2018-01-30 PROCEDURE — 94640 AIRWAY INHALATION TREATMENT: CPT

## 2018-01-30 PROCEDURE — 10000002 HB ROOM CHARGE MED SURG

## 2018-01-30 PROCEDURE — 99232 SBSQ HOSP IP/OBS MODERATE 35: CPT | Performed by: HOSPITALIST

## 2018-01-30 PROCEDURE — 10004172 HB COUNTER-THERAPY VISIT OT

## 2018-01-30 PROCEDURE — 10002803 HB RX 637: Performed by: HOSPITALIST

## 2018-01-30 PROCEDURE — 97530 THERAPEUTIC ACTIVITIES: CPT

## 2018-01-30 PROCEDURE — 10002801 HB RX 250 W/O HCPCS: Performed by: INTERNAL MEDICINE

## 2018-01-30 PROCEDURE — 83735 ASSAY OF MAGNESIUM: CPT

## 2018-01-30 PROCEDURE — 10002803 HB RX 637: Performed by: INTERNAL MEDICINE

## 2018-01-30 PROCEDURE — 94660 CPAP INITIATION&MGMT: CPT

## 2018-01-30 PROCEDURE — 97116 GAIT TRAINING THERAPY: CPT

## 2018-01-30 PROCEDURE — 94667 MNPJ CHEST WALL 1ST: CPT

## 2018-01-30 PROCEDURE — 82962 GLUCOSE BLOOD TEST: CPT

## 2018-01-30 PROCEDURE — 10004173 HB COUNTER-THERAPY VISIT PT

## 2018-01-30 PROCEDURE — 10003445 HB TELEMETRY PER DAY

## 2018-01-30 PROCEDURE — 10002800 HB RX 250 W HCPCS: Performed by: HOSPITALIST

## 2018-01-30 PROCEDURE — 10004281 HB COUNTER-STAFF TIME PER 15 MIN

## 2018-01-30 PROCEDURE — 10004651 HB RX, NO CHARGE ITEM: Performed by: HOSPITALIST

## 2018-01-30 RX ORDER — POLYETHYLENE GLYCOL 3350 17 G/17G
34 POWDER, FOR SOLUTION ORAL DAILY
Status: DISCONTINUED | OUTPATIENT
Start: 2018-01-30 | End: 2018-02-01 | Stop reason: HOSPADM

## 2018-01-30 RX ADMIN — FERROUS SULFATE TAB 325 MG (65 MG ELEMENTAL FE) 325 MG: 325 (65 FE) TAB at 07:52

## 2018-01-30 RX ADMIN — ACETAMINOPHEN AND CODEINE PHOSPHATE 1 TABLET: 300; 30 TABLET ORAL at 05:16

## 2018-01-30 RX ADMIN — ACETAMINOPHEN AND CODEINE PHOSPHATE 1 TABLET: 300; 30 TABLET ORAL at 10:07

## 2018-01-30 RX ADMIN — POLYETHYLENE GLYCOL (3350) 34 G: 17 POWDER, FOR SOLUTION ORAL at 13:43

## 2018-01-30 RX ADMIN — GUAIFENESIN AND DEXTROMETHORPHAN HYDROBROMIDE 1 TABLET: 600; 30 TABLET, EXTENDED RELEASE ORAL at 22:29

## 2018-01-30 RX ADMIN — ATORVASTATIN CALCIUM 10 MG: 10 TABLET, FILM COATED ORAL at 22:15

## 2018-01-30 RX ADMIN — METHYLPREDNISOLONE SODIUM SUCCINATE 60 MG: 125 INJECTION, POWDER, FOR SOLUTION INTRAMUSCULAR; INTRAVENOUS at 00:12

## 2018-01-30 RX ADMIN — Medication 1000 MG: at 13:43

## 2018-01-30 RX ADMIN — GABAPENTIN 300 MG: 300 CAPSULE ORAL at 22:15

## 2018-01-30 RX ADMIN — ACETAMINOPHEN AND CODEINE PHOSPHATE 1 TABLET: 300; 30 TABLET ORAL at 16:11

## 2018-01-30 RX ADMIN — ALBUTEROL SULFATE 2.5 MG: 2.5 SOLUTION RESPIRATORY (INHALATION) at 11:27

## 2018-01-30 RX ADMIN — OSELTAMIVIR PHOSPHATE 30 MG: 30 CAPSULE ORAL at 22:29

## 2018-01-30 RX ADMIN — ALBUTEROL SULFATE 2.5 MG: 2.5 SOLUTION RESPIRATORY (INHALATION) at 07:17

## 2018-01-30 RX ADMIN — DULOXETINE 60 MG: 60 CAPSULE, DELAYED RELEASE ORAL at 18:24

## 2018-01-30 RX ADMIN — ACETYLCYSTEINE 400 MG: 100 INHALANT RESPIRATORY (INHALATION) at 07:17

## 2018-01-30 RX ADMIN — SODIUM CHLORIDE, PRESERVATIVE FREE 2 ML: 5 INJECTION INTRAVENOUS at 07:58

## 2018-01-30 RX ADMIN — AZITHROMYCIN 500 MG: 250 TABLET, FILM COATED ORAL at 07:52

## 2018-01-30 RX ADMIN — METHYLPREDNISOLONE SODIUM SUCCINATE 60 MG: 125 INJECTION, POWDER, FOR SOLUTION INTRAMUSCULAR; INTRAVENOUS at 18:24

## 2018-01-30 RX ADMIN — TIZANIDINE 2 MG: 2 TABLET ORAL at 18:36

## 2018-01-30 RX ADMIN — FORMOTEROL FUMARATE DIHYDRATE 20 MCG: 20 SOLUTION RESPIRATORY (INHALATION) at 07:17

## 2018-01-30 RX ADMIN — BUSPIRONE HYDROCHLORIDE 30 MG: 15 TABLET ORAL at 07:52

## 2018-01-30 RX ADMIN — GUAIFENESIN AND DEXTROMETHORPHAN HYDROBROMIDE 2 TABLET: 600; 30 TABLET, EXTENDED RELEASE ORAL at 07:52

## 2018-01-30 RX ADMIN — OSELTAMIVIR PHOSPHATE 30 MG: 30 CAPSULE ORAL at 07:52

## 2018-01-30 RX ADMIN — INSULIN LISPRO 4 UNITS: 100 INJECTION, SOLUTION INTRAVENOUS; SUBCUTANEOUS at 22:30

## 2018-01-30 RX ADMIN — HEPARIN SODIUM 5000 UNITS: 5000 INJECTION, SOLUTION INTRAVENOUS; SUBCUTANEOUS at 05:23

## 2018-01-30 RX ADMIN — HEPARIN SODIUM 5000 UNITS: 5000 INJECTION, SOLUTION INTRAVENOUS; SUBCUTANEOUS at 13:43

## 2018-01-30 RX ADMIN — METHYLPREDNISOLONE SODIUM SUCCINATE 60 MG: 125 INJECTION, POWDER, FOR SOLUTION INTRAMUSCULAR; INTRAVENOUS at 05:23

## 2018-01-30 RX ADMIN — INSULIN LISPRO 4 UNITS: 100 INJECTION, SOLUTION INTRAVENOUS; SUBCUTANEOUS at 12:27

## 2018-01-30 RX ADMIN — FORMOTEROL FUMARATE DIHYDRATE 20 MCG: 20 SOLUTION RESPIRATORY (INHALATION) at 19:36

## 2018-01-30 RX ADMIN — INSULIN LISPRO 2 UNITS: 100 INJECTION, SOLUTION INTRAVENOUS; SUBCUTANEOUS at 18:25

## 2018-01-30 RX ADMIN — METHYLPREDNISOLONE SODIUM SUCCINATE 60 MG: 125 INJECTION, POWDER, FOR SOLUTION INTRAMUSCULAR; INTRAVENOUS at 12:27

## 2018-01-30 RX ADMIN — ACETAMINOPHEN AND CODEINE PHOSPHATE 1 TABLET: 300; 30 TABLET ORAL at 22:36

## 2018-01-30 RX ADMIN — BUDESONIDE 0.5 MG: 0.5 INHALANT RESPIRATORY (INHALATION) at 19:36

## 2018-01-30 RX ADMIN — BUDESONIDE 0.5 MG: 0.5 INHALANT RESPIRATORY (INHALATION) at 07:17

## 2018-01-30 RX ADMIN — HEPARIN SODIUM 5000 UNITS: 5000 INJECTION, SOLUTION INTRAVENOUS; SUBCUTANEOUS at 22:15

## 2018-01-30 RX ADMIN — TRAMADOL HYDROCHLORIDE 50 MG: 50 TABLET, FILM COATED ORAL at 12:36

## 2018-01-30 RX ADMIN — DULOXETINE 60 MG: 60 CAPSULE, DELAYED RELEASE ORAL at 07:52

## 2018-01-30 RX ADMIN — FERROUS SULFATE TAB 325 MG (65 MG ELEMENTAL FE) 325 MG: 325 (65 FE) TAB at 16:11

## 2018-01-30 RX ADMIN — BUSPIRONE HYDROCHLORIDE 30 MG: 15 TABLET ORAL at 18:24

## 2018-01-30 RX ADMIN — Medication 1000 MG: at 22:29

## 2018-01-30 RX ADMIN — ACETYLCYSTEINE 400 MG: 100 INHALANT RESPIRATORY (INHALATION) at 15:53

## 2018-01-30 RX ADMIN — INSULIN LISPRO 2 UNITS: 100 INJECTION, SOLUTION INTRAVENOUS; SUBCUTANEOUS at 07:58

## 2018-01-30 RX ADMIN — TIZANIDINE 2 MG: 2 TABLET ORAL at 07:58

## 2018-01-30 RX ADMIN — CLONAZEPAM 2 MG: 1 TABLET ORAL at 22:32

## 2018-01-30 RX ADMIN — BUPROPION HYDROCHLORIDE 450 MG: 150 TABLET, FILM COATED, EXTENDED RELEASE ORAL at 07:52

## 2018-01-30 RX ADMIN — LEVOTHYROXINE SODIUM 50 MCG: 50 TABLET ORAL at 07:52

## 2018-01-30 RX ADMIN — FUROSEMIDE 40 MG: 40 TABLET ORAL at 07:52

## 2018-01-30 RX ADMIN — ACETYLCYSTEINE 400 MG: 100 INHALANT RESPIRATORY (INHALATION) at 11:27

## 2018-01-30 RX ADMIN — Medication 1000 MG: at 05:21

## 2018-01-30 RX ADMIN — ALBUTEROL SULFATE 2.5 MG: 2.5 SOLUTION RESPIRATORY (INHALATION) at 15:53

## 2018-01-30 ASSESSMENT — PAIN SCALES - GENERAL
PAIN_LEVEL_AT_REST: 1
PAIN_LEVEL_WITH_ACTIVITY: 6
PAIN_LEVEL_AT_REST: 4
PAIN_LEVEL_AT_REST: 6
PAIN_LEVEL_AT_REST: 3
PAIN_LEVEL_AT_REST: 5
PAIN_LEVEL_WITH_ACTIVITY: 6
PAIN_LEVEL_AT_REST: 6
PAIN_LEVEL_AT_REST: 6
PAIN_LEVEL_AT_REST: 3
PAIN_LEVEL_AT_REST: 8

## 2018-01-31 ENCOUNTER — APPOINTMENT (OUTPATIENT)
Dept: REHABILITATION | Age: 63
End: 2018-01-31
Attending: ORTHOPAEDIC SURGERY

## 2018-01-31 ENCOUNTER — APPOINTMENT (OUTPATIENT)
Dept: CARDIOLOGY | Age: 63
DRG: 871 | End: 2018-01-31
Attending: HOSPITALIST

## 2018-01-31 LAB
ASCENDING AORTA (AAD): 4.2 CM
AV MEAN GRADIENT (AVMG): 10.7 MMHG
AV PEAK GRADIENT (AVPG): 19.1 MMHG
AV PEAK VELOCITY (AVPV): 2.2 M/S
BACTERIA SPEC RESP CULT: ABNORMAL
BACTERIA SPEC RESP CULT: ABNORMAL
DOP CALC LVOT PEAK VEL (LVOTPV): 1.2 M/S
E WAVE DECELARATION TIME (MDT): 144.2 MS
EST RIGHT VENT SYSTOLIC PRESSURE BY TRICUSPID REGURGITATION JET (RVSP): 37.6 MMHG
GLUCOSE BLDC GLUCOMTR-MCNC: 150 MG/DL (ref 65–99)
GLUCOSE BLDC GLUCOMTR-MCNC: 170 MG/DL (ref 65–99)
GLUCOSE BLDC GLUCOMTR-MCNC: 179 MG/DL (ref 65–99)
GLUCOSE BLDC GLUCOMTR-MCNC: 188 MG/DL (ref 65–99)
GRAM STN SPEC: ABNORMAL
INTERVENTRICULAR SEPTUM IN END DIASTOLE (IVSD): 1.3 CM
LEFT INTERNAL DIMENSION IN SYSTOLE (LVSD): 3.3 CM
LEFT VENTRICULAR INTERNAL DIMENSION IN DIASTOLE (LVDD): 4.9 CM
LEFT VENTRICULAR POSTERIOR WALL IN END DIASTOLE (LVPW): 1.4 CM
LV EF: 55 %
LVOT 2D (LVOTD): 2.2 CM
MV E TISSUE VEL LAT (MELV): 8.8 CM/S
MV E TISSUE VEL MED (MESV): 8.6 CM/S
MV E WAVE VEL/E TISSUE VEL MED(MSR): 7
MV PEAK A VELOCITY (MVPAV): 1.2 M/S
MV PEAK E VELOCITY (MVPEV): 0.6 M/S
PV PEAK VELOCITY (PVPV): 0.8 M/S
REPORT STATUS (RPT): ABNORMAL
SPECIMEN SOURCE: ABNORMAL
TRICUSPID VALVE PEAK REGURGITATION VELOCITY (TRPV): 2.7 M/S
TV ESTIMATED RIGHT ARTERIAL PRESSURE (RAP): 8 MMHG

## 2018-01-31 PROCEDURE — 99232 SBSQ HOSP IP/OBS MODERATE 35: CPT | Performed by: HOSPITALIST

## 2018-01-31 PROCEDURE — 10002807 HB RX 258: Performed by: INTERNAL MEDICINE

## 2018-01-31 PROCEDURE — 99232 SBSQ HOSP IP/OBS MODERATE 35: CPT | Performed by: INTERNAL MEDICINE

## 2018-01-31 PROCEDURE — 94668 MNPJ CHEST WALL SBSQ: CPT

## 2018-01-31 PROCEDURE — 10002803 HB RX 637: Performed by: INTERNAL MEDICINE

## 2018-01-31 PROCEDURE — 97116 GAIT TRAINING THERAPY: CPT | Performed by: PHYSICAL THERAPIST

## 2018-01-31 PROCEDURE — 10002803 HB RX 637: Performed by: HOSPITALIST

## 2018-01-31 PROCEDURE — 93306 TTE W/DOPPLER COMPLETE: CPT

## 2018-01-31 PROCEDURE — 93306 TTE W/DOPPLER COMPLETE: CPT | Performed by: INTERNAL MEDICINE

## 2018-01-31 PROCEDURE — 10002801 HB RX 250 W/O HCPCS: Performed by: INTERNAL MEDICINE

## 2018-01-31 PROCEDURE — 10004173 HB COUNTER-THERAPY VISIT PT: Performed by: PHYSICAL THERAPIST

## 2018-01-31 PROCEDURE — 94667 MNPJ CHEST WALL 1ST: CPT

## 2018-01-31 PROCEDURE — 82962 GLUCOSE BLOOD TEST: CPT

## 2018-01-31 PROCEDURE — 10000002 HB ROOM CHARGE MED SURG

## 2018-01-31 PROCEDURE — 97535 SELF CARE MNGMENT TRAINING: CPT

## 2018-01-31 PROCEDURE — 10002800 HB RX 250 W HCPCS: Performed by: HOSPITALIST

## 2018-01-31 PROCEDURE — 10004325 HB COUNTER ASSESSMENT EA 15 MIN

## 2018-01-31 PROCEDURE — 10004172 HB COUNTER-THERAPY VISIT OT

## 2018-01-31 PROCEDURE — 94640 AIRWAY INHALATION TREATMENT: CPT

## 2018-01-31 PROCEDURE — 10002800 HB RX 250 W HCPCS: Performed by: INTERNAL MEDICINE

## 2018-01-31 RX ORDER — PREDNISONE 10 MG/1
10 TABLET ORAL 2 TIMES DAILY WITH MEALS
Status: DISCONTINUED | OUTPATIENT
Start: 2018-01-31 | End: 2018-02-01 | Stop reason: HOSPADM

## 2018-01-31 RX ADMIN — DULOXETINE 60 MG: 60 CAPSULE, DELAYED RELEASE ORAL at 17:13

## 2018-01-31 RX ADMIN — INSULIN LISPRO 2 UNITS: 100 INJECTION, SOLUTION INTRAVENOUS; SUBCUTANEOUS at 21:44

## 2018-01-31 RX ADMIN — METHYLPREDNISOLONE SODIUM SUCCINATE 60 MG: 125 INJECTION, POWDER, FOR SOLUTION INTRAMUSCULAR; INTRAVENOUS at 07:39

## 2018-01-31 RX ADMIN — FUROSEMIDE 40 MG: 40 TABLET ORAL at 09:16

## 2018-01-31 RX ADMIN — GUAIFENESIN AND DEXTROMETHORPHAN HYDROBROMIDE 2 TABLET: 600; 30 TABLET, EXTENDED RELEASE ORAL at 21:45

## 2018-01-31 RX ADMIN — METHYLPREDNISOLONE SODIUM SUCCINATE 60 MG: 125 INJECTION, POWDER, FOR SOLUTION INTRAMUSCULAR; INTRAVENOUS at 00:57

## 2018-01-31 RX ADMIN — BUSPIRONE HYDROCHLORIDE 30 MG: 15 TABLET ORAL at 09:16

## 2018-01-31 RX ADMIN — TIZANIDINE 2 MG: 2 TABLET ORAL at 04:39

## 2018-01-31 RX ADMIN — FORMOTEROL FUMARATE DIHYDRATE 20 MCG: 20 SOLUTION RESPIRATORY (INHALATION) at 19:28

## 2018-01-31 RX ADMIN — SODIUM CHLORIDE 1 G: 900 INJECTION INTRAVENOUS at 10:32

## 2018-01-31 RX ADMIN — HEPARIN SODIUM 5000 UNITS: 5000 INJECTION, SOLUTION INTRAVENOUS; SUBCUTANEOUS at 21:42

## 2018-01-31 RX ADMIN — OSELTAMIVIR PHOSPHATE 30 MG: 30 CAPSULE ORAL at 09:16

## 2018-01-31 RX ADMIN — Medication 1000 MG: at 07:39

## 2018-01-31 RX ADMIN — BUDESONIDE 0.5 MG: 0.5 INHALANT RESPIRATORY (INHALATION) at 19:29

## 2018-01-31 RX ADMIN — OSELTAMIVIR PHOSPHATE 30 MG: 30 CAPSULE ORAL at 21:44

## 2018-01-31 RX ADMIN — INSULIN LISPRO 2 UNITS: 100 INJECTION, SOLUTION INTRAVENOUS; SUBCUTANEOUS at 17:14

## 2018-01-31 RX ADMIN — LEVOTHYROXINE SODIUM 50 MCG: 50 TABLET ORAL at 09:16

## 2018-01-31 RX ADMIN — BUSPIRONE HYDROCHLORIDE 30 MG: 15 TABLET ORAL at 17:14

## 2018-01-31 RX ADMIN — FERROUS SULFATE TAB 325 MG (65 MG ELEMENTAL FE) 325 MG: 325 (65 FE) TAB at 17:13

## 2018-01-31 RX ADMIN — TIZANIDINE 2 MG: 2 TABLET ORAL at 17:33

## 2018-01-31 RX ADMIN — FERROUS SULFATE TAB 325 MG (65 MG ELEMENTAL FE) 325 MG: 325 (65 FE) TAB at 09:16

## 2018-01-31 RX ADMIN — ACETAMINOPHEN AND CODEINE PHOSPHATE 1 TABLET: 300; 30 TABLET ORAL at 17:33

## 2018-01-31 RX ADMIN — HEPARIN SODIUM 5000 UNITS: 5000 INJECTION, SOLUTION INTRAVENOUS; SUBCUTANEOUS at 09:29

## 2018-01-31 RX ADMIN — INSULIN LISPRO 2 UNITS: 100 INJECTION, SOLUTION INTRAVENOUS; SUBCUTANEOUS at 12:46

## 2018-01-31 RX ADMIN — GABAPENTIN 300 MG: 300 CAPSULE ORAL at 21:46

## 2018-01-31 RX ADMIN — TRAMADOL HYDROCHLORIDE 50 MG: 50 TABLET, FILM COATED ORAL at 11:27

## 2018-01-31 RX ADMIN — FORMOTEROL FUMARATE DIHYDRATE 20 MCG: 20 SOLUTION RESPIRATORY (INHALATION) at 06:41

## 2018-01-31 RX ADMIN — POLYETHYLENE GLYCOL (3350) 34 G: 17 POWDER, FOR SOLUTION ORAL at 09:16

## 2018-01-31 RX ADMIN — INSULIN LISPRO 2 UNITS: 100 INJECTION, SOLUTION INTRAVENOUS; SUBCUTANEOUS at 09:16

## 2018-01-31 RX ADMIN — CLONAZEPAM 2 MG: 1 TABLET ORAL at 21:47

## 2018-01-31 RX ADMIN — PREDNISONE 10 MG: 10 TABLET ORAL at 17:13

## 2018-01-31 RX ADMIN — ACETAMINOPHEN AND CODEINE PHOSPHATE 1 TABLET: 300; 30 TABLET ORAL at 21:47

## 2018-01-31 RX ADMIN — METHYLPREDNISOLONE SODIUM SUCCINATE 60 MG: 125 INJECTION, POWDER, FOR SOLUTION INTRAMUSCULAR; INTRAVENOUS at 11:26

## 2018-01-31 RX ADMIN — GUAIFENESIN AND DEXTROMETHORPHAN HYDROBROMIDE 2 TABLET: 600; 30 TABLET, EXTENDED RELEASE ORAL at 09:15

## 2018-01-31 RX ADMIN — BUDESONIDE 0.5 MG: 0.5 INHALANT RESPIRATORY (INHALATION) at 06:41

## 2018-01-31 RX ADMIN — HEPARIN SODIUM 5000 UNITS: 5000 INJECTION, SOLUTION INTRAVENOUS; SUBCUTANEOUS at 16:11

## 2018-01-31 RX ADMIN — ACETAMINOPHEN AND CODEINE PHOSPHATE 1 TABLET: 300; 30 TABLET ORAL at 09:16

## 2018-01-31 RX ADMIN — BUPROPION HYDROCHLORIDE 450 MG: 150 TABLET, FILM COATED, EXTENDED RELEASE ORAL at 09:16

## 2018-01-31 RX ADMIN — ACETAMINOPHEN AND CODEINE PHOSPHATE 1 TABLET: 300; 30 TABLET ORAL at 04:39

## 2018-01-31 RX ADMIN — DULOXETINE 60 MG: 60 CAPSULE, DELAYED RELEASE ORAL at 09:15

## 2018-01-31 RX ADMIN — ATORVASTATIN CALCIUM 10 MG: 10 TABLET, FILM COATED ORAL at 21:44

## 2018-01-31 ASSESSMENT — PAIN SCALES - GENERAL
PAIN_LEVEL_WITH_ACTIVITY: 6
PAIN_LEVEL_AT_REST: 5
PAIN_LEVEL_AT_REST: 5
PAIN_LEVEL_AT_REST: 4
PAIN_LEVEL_AT_REST: 3
PAIN_LEVEL_AT_REST: 5
PAIN_LEVEL_AT_REST: 1
PAIN_LEVEL_AT_REST: 4
PAIN_LEVEL_AT_REST: 3

## 2018-02-01 VITALS
DIASTOLIC BLOOD PRESSURE: 76 MMHG | HEART RATE: 87 BPM | TEMPERATURE: 98 F | BODY MASS INDEX: 47.68 KG/M2 | OXYGEN SATURATION: 94 % | WEIGHT: 286.2 LBS | RESPIRATION RATE: 18 BRPM | HEIGHT: 65 IN | SYSTOLIC BLOOD PRESSURE: 143 MMHG

## 2018-02-01 LAB
ERYTHROCYTE [DISTWIDTH] IN BLOOD: 13.1 % (ref 11–15)
GLUCOSE BLDC GLUCOMTR-MCNC: 124 MG/DL (ref 65–99)
GLUCOSE BLDC GLUCOMTR-MCNC: 144 MG/DL (ref 65–99)
HCT VFR BLD CALC: 33.6 % (ref 36–46.5)
HGB BLD-MCNC: 11.4 G/DL (ref 12–15.5)
MCH RBC QN AUTO: 30.4 PG (ref 26–34)
MCHC RBC AUTO-ENTMCNC: 33.9 G/DL (ref 32–36.5)
MCV RBC AUTO: 89.6 FL (ref 78–100)
PLATELET # BLD: 209 K/MCL (ref 140–450)
RBC # BLD: 3.75 MIL/MCL (ref 4–5.2)
WBC # BLD: 11.2 K/MCL (ref 4.2–11)

## 2018-02-01 PROCEDURE — 10002803 HB RX 637: Performed by: HOSPITALIST

## 2018-02-01 PROCEDURE — 85027 COMPLETE CBC AUTOMATED: CPT

## 2018-02-01 PROCEDURE — 10004172 HB COUNTER-THERAPY VISIT OT

## 2018-02-01 PROCEDURE — 10002800 HB RX 250 W HCPCS: Performed by: HOSPITALIST

## 2018-02-01 PROCEDURE — 10002807 HB RX 258: Performed by: INTERNAL MEDICINE

## 2018-02-01 PROCEDURE — 10002801 HB RX 250 W/O HCPCS: Performed by: INTERNAL MEDICINE

## 2018-02-01 PROCEDURE — 82962 GLUCOSE BLOOD TEST: CPT

## 2018-02-01 PROCEDURE — 97535 SELF CARE MNGMENT TRAINING: CPT

## 2018-02-01 PROCEDURE — 94640 AIRWAY INHALATION TREATMENT: CPT

## 2018-02-01 PROCEDURE — 97530 THERAPEUTIC ACTIVITIES: CPT

## 2018-02-01 PROCEDURE — 99238 HOSP IP/OBS DSCHRG MGMT 30/<: CPT | Performed by: HOSPITALIST

## 2018-02-01 PROCEDURE — 10002800 HB RX 250 W HCPCS: Performed by: INTERNAL MEDICINE

## 2018-02-01 PROCEDURE — 94667 MNPJ CHEST WALL 1ST: CPT

## 2018-02-01 PROCEDURE — 10002803 HB RX 637: Performed by: INTERNAL MEDICINE

## 2018-02-01 RX ORDER — FLUTICASONE PROPIONATE AND SALMETEROL 500; 50 UG/1; UG/1
1 POWDER RESPIRATORY (INHALATION)
Status: DISCONTINUED | OUTPATIENT
Start: 2018-02-01 | End: 2018-02-01 | Stop reason: HOSPADM

## 2018-02-01 RX ORDER — FLUCONAZOLE 100 MG/1
100 TABLET ORAL DAILY
Qty: 5 TABLET | Refills: 5 | Status: SHIPPED | OUTPATIENT
Start: 2018-02-01 | End: 2018-03-05 | Stop reason: ALTCHOICE

## 2018-02-01 RX ORDER — LEVOFLOXACIN 500 MG/1
500 TABLET, FILM COATED ORAL DAILY
Qty: 5 TABLET | Refills: 0 | Status: SHIPPED | OUTPATIENT
Start: 2018-02-01 | End: 2018-02-12 | Stop reason: SDUPTHER

## 2018-02-01 RX ADMIN — HEPARIN SODIUM 5000 UNITS: 5000 INJECTION, SOLUTION INTRAVENOUS; SUBCUTANEOUS at 06:17

## 2018-02-01 RX ADMIN — GUAIFENESIN AND DEXTROMETHORPHAN HYDROBROMIDE 2 TABLET: 600; 30 TABLET, EXTENDED RELEASE ORAL at 11:39

## 2018-02-01 RX ADMIN — TIZANIDINE 2 MG: 2 TABLET ORAL at 14:09

## 2018-02-01 RX ADMIN — FORMOTEROL FUMARATE DIHYDRATE 20 MCG: 20 SOLUTION RESPIRATORY (INHALATION) at 07:20

## 2018-02-01 RX ADMIN — ACETAMINOPHEN AND CODEINE PHOSPHATE 1 TABLET: 300; 30 TABLET ORAL at 01:51

## 2018-02-01 RX ADMIN — DULOXETINE 60 MG: 60 CAPSULE, DELAYED RELEASE ORAL at 08:56

## 2018-02-01 RX ADMIN — SODIUM CHLORIDE 1 G: 900 INJECTION INTRAVENOUS at 08:57

## 2018-02-01 RX ADMIN — TIZANIDINE 2 MG: 2 TABLET ORAL at 01:51

## 2018-02-01 RX ADMIN — FERROUS SULFATE TAB 325 MG (65 MG ELEMENTAL FE) 325 MG: 325 (65 FE) TAB at 08:57

## 2018-02-01 RX ADMIN — SODIUM CHLORIDE, PRESERVATIVE FREE 500 UNITS: 5 INJECTION INTRAVENOUS at 15:23

## 2018-02-01 RX ADMIN — BUPROPION HYDROCHLORIDE 450 MG: 150 TABLET, FILM COATED, EXTENDED RELEASE ORAL at 08:56

## 2018-02-01 RX ADMIN — BUDESONIDE 0.5 MG: 0.5 INHALANT RESPIRATORY (INHALATION) at 07:20

## 2018-02-01 RX ADMIN — FUROSEMIDE 40 MG: 40 TABLET ORAL at 08:56

## 2018-02-01 RX ADMIN — LEVOTHYROXINE SODIUM 50 MCG: 50 TABLET ORAL at 08:57

## 2018-02-01 RX ADMIN — OSELTAMIVIR PHOSPHATE 30 MG: 30 CAPSULE ORAL at 08:56

## 2018-02-01 RX ADMIN — PREDNISONE 10 MG: 10 TABLET ORAL at 08:56

## 2018-02-01 RX ADMIN — BUSPIRONE HYDROCHLORIDE 30 MG: 15 TABLET ORAL at 08:56

## 2018-02-01 ASSESSMENT — PAIN SCALES - GENERAL
PAIN_LEVEL_AT_REST: 2
PAIN_LEVEL_AT_REST: 3
PAIN_LEVEL_AT_REST: 2

## 2018-02-02 ENCOUNTER — TELEPHONE (OUTPATIENT)
Dept: INTERNAL MEDICINE | Age: 63
End: 2018-02-02

## 2018-02-02 ENCOUNTER — APPOINTMENT (OUTPATIENT)
Dept: REHABILITATION | Age: 63
End: 2018-02-02
Attending: ORTHOPAEDIC SURGERY

## 2018-02-02 LAB
BACTERIA BLD CULT: NORMAL
BACTERIA BLD CULT: NORMAL
REPORT STATUS (RPT): NORMAL
REPORT STATUS (RPT): NORMAL
SPECIMEN SOURCE: NORMAL
SPECIMEN SOURCE: NORMAL

## 2018-02-07 ENCOUNTER — E-ADVICE (OUTPATIENT)
Dept: ALLERGY | Age: 63
End: 2018-02-07

## 2018-02-07 ENCOUNTER — TELEPHONE (OUTPATIENT)
Dept: INTERNAL MEDICINE | Age: 63
End: 2018-02-07

## 2018-02-07 ENCOUNTER — APPOINTMENT (OUTPATIENT)
Dept: REHABILITATION | Age: 63
End: 2018-02-07
Attending: ORTHOPAEDIC SURGERY

## 2018-02-07 ENCOUNTER — OFFICE VISIT (OUTPATIENT)
Dept: INTERNAL MEDICINE | Age: 63
End: 2018-02-07

## 2018-02-07 VITALS
BODY MASS INDEX: 46 KG/M2 | SYSTOLIC BLOOD PRESSURE: 112 MMHG | WEIGHT: 276.4 LBS | DIASTOLIC BLOOD PRESSURE: 80 MMHG | OXYGEN SATURATION: 99 % | HEART RATE: 114 BPM

## 2018-02-07 DIAGNOSIS — J15.9 BACTERIAL PNEUMONIA: Primary | ICD-10-CM

## 2018-02-07 DIAGNOSIS — E11.9 TYPE 2 DIABETES MELLITUS WITHOUT COMPLICATION, WITHOUT LONG-TERM CURRENT USE OF INSULIN (CMD): ICD-10-CM

## 2018-02-07 DIAGNOSIS — D80.1 HYPOGAMMAGLOBULINEMIA (CMD): ICD-10-CM

## 2018-02-07 DIAGNOSIS — I10 ESSENTIAL HYPERTENSION WITH GOAL BLOOD PRESSURE LESS THAN 140/90: ICD-10-CM

## 2018-02-07 DIAGNOSIS — E03.9 HYPOTHYROIDISM, UNSPECIFIED TYPE: ICD-10-CM

## 2018-02-07 PROCEDURE — 99214 OFFICE O/P EST MOD 30 MIN: CPT | Performed by: INTERNAL MEDICINE

## 2018-02-07 RX ORDER — FLUCONAZOLE 100 MG/1
100 TABLET ORAL 2 TIMES DAILY
Qty: 14 TABLET | Refills: 0 | Status: SHIPPED | OUTPATIENT
Start: 2018-02-07 | End: 2018-03-05 | Stop reason: SDUPTHER

## 2018-02-07 RX ORDER — GABAPENTIN 100 MG/1
100 CAPSULE ORAL EVERY MORNING
Qty: 30 CAPSULE | Refills: 3 | Status: SHIPPED | OUTPATIENT
Start: 2018-02-07 | End: 2018-03-15 | Stop reason: DRUGHIGH

## 2018-02-09 ENCOUNTER — E-ADVICE (OUTPATIENT)
Dept: INTERNAL MEDICINE | Age: 63
End: 2018-02-09

## 2018-02-09 ENCOUNTER — APPOINTMENT (OUTPATIENT)
Dept: REHABILITATION | Age: 63
End: 2018-02-09
Attending: ORTHOPAEDIC SURGERY

## 2018-02-09 RX ORDER — TRAMADOL HYDROCHLORIDE 50 MG/1
50 TABLET ORAL EVERY 6 HOURS PRN
Qty: 50 TABLET | Refills: 3 | Status: SHIPPED | OUTPATIENT
Start: 2018-02-09 | End: 2018-05-29 | Stop reason: SDUPTHER

## 2018-02-12 ENCOUNTER — E-ADVICE (OUTPATIENT)
Dept: PULMONOLOGY | Age: 63
End: 2018-02-12

## 2018-02-12 ENCOUNTER — E-ADVICE (OUTPATIENT)
Dept: INTERNAL MEDICINE | Age: 63
End: 2018-02-12

## 2018-02-12 ENCOUNTER — OFFICE VISIT (OUTPATIENT)
Dept: NEUROLOGY | Age: 63
End: 2018-02-12
Attending: INTERNAL MEDICINE

## 2018-02-12 ENCOUNTER — APPOINTMENT (OUTPATIENT)
Dept: LAB | Age: 63
End: 2018-02-12

## 2018-02-12 ENCOUNTER — OFFICE VISIT (OUTPATIENT)
Dept: CHIROPRACTIC MEDICINE | Age: 63
End: 2018-02-12

## 2018-02-12 ENCOUNTER — LAB SERVICES (OUTPATIENT)
Dept: LAB | Age: 63
End: 2018-02-12

## 2018-02-12 VITALS
SYSTOLIC BLOOD PRESSURE: 108 MMHG | BODY MASS INDEX: 45.26 KG/M2 | DIASTOLIC BLOOD PRESSURE: 68 MMHG | HEART RATE: 96 BPM | WEIGHT: 272 LBS

## 2018-02-12 DIAGNOSIS — M54.6 PAIN IN THORACIC SPINE: ICD-10-CM

## 2018-02-12 DIAGNOSIS — J18.9 PNEUMONIA, ORGANISM UNSPECIFIED(486): Primary | ICD-10-CM

## 2018-02-12 DIAGNOSIS — J18.9 PNEUMONIA, ORGANISM UNSPECIFIED(486): ICD-10-CM

## 2018-02-12 DIAGNOSIS — M99.02 THORACIC REGION SOMATIC DYSFUNCTION: ICD-10-CM

## 2018-02-12 DIAGNOSIS — G31.84 MILD COGNITIVE IMPAIRMENT WITH MEMORY LOSS: Primary | ICD-10-CM

## 2018-02-12 DIAGNOSIS — M54.50 ACUTE RIGHT-SIDED LOW BACK PAIN WITHOUT SCIATICA: Primary | ICD-10-CM

## 2018-02-12 DIAGNOSIS — E78.00 PURE HYPERCHOLESTEROLEMIA: Primary | ICD-10-CM

## 2018-02-12 DIAGNOSIS — R25.1 TREMOR: ICD-10-CM

## 2018-02-12 DIAGNOSIS — G43.009 MIGRAINE WITHOUT AURA AND WITHOUT STATUS MIGRAINOSUS, NOT INTRACTABLE: ICD-10-CM

## 2018-02-12 DIAGNOSIS — F32.A DEPRESSION, UNSPECIFIED DEPRESSION TYPE: ICD-10-CM

## 2018-02-12 DIAGNOSIS — M99.03 LUMBAR REGION SOMATIC DYSFUNCTION: ICD-10-CM

## 2018-02-12 DIAGNOSIS — M99.04 SACRAL REGION SOMATIC DYSFUNCTION: ICD-10-CM

## 2018-02-12 PROCEDURE — 98941 CHIROPRACT MANJ 3-4 REGIONS: CPT | Performed by: CHIROPRACTOR

## 2018-02-12 PROCEDURE — 99244 OFF/OP CNSLTJ NEW/EST MOD 40: CPT | Performed by: PSYCHIATRY & NEUROLOGY

## 2018-02-12 PROCEDURE — 87205 SMEAR GRAM STAIN: CPT | Performed by: INTERNAL MEDICINE

## 2018-02-12 PROCEDURE — 87077 CULTURE AEROBIC IDENTIFY: CPT | Performed by: INTERNAL MEDICINE

## 2018-02-12 PROCEDURE — 87070 CULTURE OTHR SPECIMN AEROBIC: CPT | Performed by: INTERNAL MEDICINE

## 2018-02-12 RX ORDER — BUSPIRONE HYDROCHLORIDE 30 MG/1
TABLET ORAL
Qty: 180 TABLET | Refills: 3 | Status: SHIPPED | OUTPATIENT
Start: 2018-02-12 | End: 2018-04-25 | Stop reason: SDUPTHER

## 2018-02-12 RX ORDER — OMEPRAZOLE 40 MG/1
CAPSULE, DELAYED RELEASE ORAL
Qty: 180 CAPSULE | Refills: 3 | Status: SHIPPED | OUTPATIENT
Start: 2018-02-12 | End: 2019-02-09 | Stop reason: SDUPTHER

## 2018-02-12 RX ORDER — LEVOTHYROXINE SODIUM 0.05 MG/1
TABLET ORAL
Qty: 90 TABLET | Refills: 3 | Status: SHIPPED | OUTPATIENT
Start: 2018-02-12 | End: 2019-03-15 | Stop reason: SDUPTHER

## 2018-02-12 RX ORDER — ATORVASTATIN CALCIUM 10 MG/1
10 TABLET, FILM COATED ORAL DAILY
Qty: 90 TABLET | Refills: 3 | Status: SHIPPED | OUTPATIENT
Start: 2018-02-12 | End: 2018-06-08

## 2018-02-12 RX ORDER — TIZANIDINE 2 MG/1
TABLET ORAL
Qty: 200 TABLET | Refills: 1 | Status: SHIPPED | OUTPATIENT
Start: 2018-02-12 | End: 2018-09-28 | Stop reason: SDUPTHER

## 2018-02-12 RX ORDER — CLONAZEPAM 2 MG/1
2 TABLET ORAL NIGHTLY PRN
Qty: 90 TABLET | Refills: 1 | Status: SHIPPED | OUTPATIENT
Start: 2018-02-12 | End: 2018-04-25 | Stop reason: SDUPTHER

## 2018-02-12 RX ORDER — LEVOFLOXACIN 500 MG/1
500 TABLET, FILM COATED ORAL DAILY
Qty: 5 TABLET | Refills: 0 | Status: SHIPPED | OUTPATIENT
Start: 2018-02-12 | End: 2018-03-15 | Stop reason: SDUPTHER

## 2018-02-14 LAB
BACTERIA SPEC RESP CULT: ABNORMAL
BACTERIA SPEC RESP CULT: ABNORMAL
GRAM STN SPEC: ABNORMAL
REPORT STATUS (RPT): ABNORMAL
SPECIMEN SOURCE: ABNORMAL

## 2018-02-15 ENCOUNTER — TELEPHONE (OUTPATIENT)
Dept: INTERNAL MEDICINE | Age: 63
End: 2018-02-15

## 2018-02-15 ENCOUNTER — NURSE ONLY (OUTPATIENT)
Dept: PULMONOLOGY | Age: 63
End: 2018-02-15

## 2018-02-15 PROCEDURE — 96372 THER/PROPH/DIAG INJ SC/IM: CPT | Performed by: INTERNAL MEDICINE

## 2018-02-16 ENCOUNTER — TELEPHONE (OUTPATIENT)
Dept: PULMONOLOGY | Age: 63
End: 2018-02-16

## 2018-02-16 DIAGNOSIS — J44.89 COB (CHRONIC OBSTRUCTIVE BRONCHITIS): Primary | ICD-10-CM

## 2018-02-19 ENCOUNTER — OFFICE VISIT (OUTPATIENT)
Dept: CHIROPRACTIC MEDICINE | Age: 63
End: 2018-02-19

## 2018-02-19 DIAGNOSIS — M99.04 SACRAL REGION SOMATIC DYSFUNCTION: ICD-10-CM

## 2018-02-19 DIAGNOSIS — M54.50 ACUTE RIGHT-SIDED LOW BACK PAIN WITHOUT SCIATICA: Primary | ICD-10-CM

## 2018-02-19 DIAGNOSIS — M99.02 THORACIC REGION SOMATIC DYSFUNCTION: ICD-10-CM

## 2018-02-19 DIAGNOSIS — M99.03 LUMBAR REGION SOMATIC DYSFUNCTION: ICD-10-CM

## 2018-02-19 DIAGNOSIS — M99.05 PELVIC SOMATIC DYSFUNCTION: ICD-10-CM

## 2018-02-19 DIAGNOSIS — M54.6 PAIN IN THORACIC SPINE: ICD-10-CM

## 2018-02-19 PROCEDURE — 98941 CHIROPRACT MANJ 3-4 REGIONS: CPT | Performed by: CHIROPRACTOR

## 2018-02-19 RX ORDER — FLUTICASONE PROPIONATE AND SALMETEROL 500; 50 UG/1; UG/1
2 POWDER RESPIRATORY (INHALATION) 2 TIMES DAILY
Qty: 3 EACH | Refills: 3 | Status: SHIPPED | OUTPATIENT
Start: 2018-02-19 | End: 2018-03-01 | Stop reason: SDUPTHER

## 2018-02-21 ENCOUNTER — LAB SERVICES (OUTPATIENT)
Dept: LAB | Age: 63
End: 2018-02-21

## 2018-02-21 DIAGNOSIS — J44.89 COB (CHRONIC OBSTRUCTIVE BRONCHITIS): ICD-10-CM

## 2018-02-21 PROCEDURE — 87077 CULTURE AEROBIC IDENTIFY: CPT | Performed by: INTERNAL MEDICINE

## 2018-02-21 PROCEDURE — 87070 CULTURE OTHR SPECIMN AEROBIC: CPT | Performed by: INTERNAL MEDICINE

## 2018-02-21 PROCEDURE — 87205 SMEAR GRAM STAIN: CPT | Performed by: INTERNAL MEDICINE

## 2018-02-22 ENCOUNTER — E-ADVICE (OUTPATIENT)
Dept: INTERNAL MEDICINE | Age: 63
End: 2018-02-22

## 2018-02-22 ENCOUNTER — APPOINTMENT (OUTPATIENT)
Dept: REHABILITATION | Age: 63
End: 2018-02-22
Attending: ORTHOPAEDIC SURGERY

## 2018-02-22 RX ORDER — CODEINE PHOSPHATE AND GUAIFENESIN 10; 100 MG/5ML; MG/5ML
5 SOLUTION ORAL 3 TIMES DAILY PRN
Qty: 240 ML | Refills: 0 | Status: SHIPPED | OUTPATIENT
Start: 2018-02-22 | End: 2018-05-09 | Stop reason: SDUPTHER

## 2018-02-25 LAB
BACTERIA SPEC RESP CULT: ABNORMAL
GRAM STN SPEC: ABNORMAL
REPORT STATUS (RPT): ABNORMAL
SPECIMEN SOURCE: ABNORMAL

## 2018-02-26 ENCOUNTER — OFFICE VISIT (OUTPATIENT)
Dept: CHIROPRACTIC MEDICINE | Age: 63
End: 2018-02-26

## 2018-02-26 ENCOUNTER — CLINICAL ABSTRACT (OUTPATIENT)
Dept: OTHER | Age: 63
End: 2018-02-26

## 2018-02-26 ENCOUNTER — TELEPHONE (OUTPATIENT)
Dept: INTERNAL MEDICINE | Age: 63
End: 2018-02-26

## 2018-02-26 DIAGNOSIS — M99.04 SACRAL REGION SOMATIC DYSFUNCTION: ICD-10-CM

## 2018-02-26 DIAGNOSIS — M54.50 ACUTE RIGHT-SIDED LOW BACK PAIN WITHOUT SCIATICA: Primary | ICD-10-CM

## 2018-02-26 DIAGNOSIS — M62.81 MUSCLE WEAKNESS OF LOWER EXTREMITY: ICD-10-CM

## 2018-02-26 DIAGNOSIS — M99.03 LUMBAR REGION SOMATIC DYSFUNCTION: ICD-10-CM

## 2018-02-26 PROCEDURE — 99213 OFFICE O/P EST LOW 20 MIN: CPT | Performed by: CHIROPRACTOR

## 2018-02-26 PROCEDURE — 98940 CHIROPRACT MANJ 1-2 REGIONS: CPT | Performed by: CHIROPRACTOR

## 2018-02-27 ENCOUNTER — APPOINTMENT (OUTPATIENT)
Dept: REHABILITATION | Age: 63
End: 2018-02-27
Attending: ORTHOPAEDIC SURGERY

## 2018-02-27 RX ORDER — DOXYCYCLINE HYCLATE 100 MG
100 TABLET ORAL 2 TIMES DAILY
Qty: 20 TABLET | Refills: 0 | Status: SHIPPED | OUTPATIENT
Start: 2018-02-27 | End: 2018-03-10

## 2018-02-28 ENCOUNTER — E-ADVICE (OUTPATIENT)
Dept: ALLERGY | Age: 63
End: 2018-02-28

## 2018-03-01 ENCOUNTER — TELEPHONE (OUTPATIENT)
Dept: INTERNAL MEDICINE | Age: 63
End: 2018-03-01

## 2018-03-01 ENCOUNTER — APPOINTMENT (OUTPATIENT)
Dept: REHABILITATION | Age: 63
End: 2018-03-01
Attending: ORTHOPAEDIC SURGERY

## 2018-03-01 RX ORDER — FLUTICASONE PROPIONATE AND SALMETEROL 500; 50 UG/1; UG/1
1 POWDER RESPIRATORY (INHALATION) 2 TIMES DAILY
Qty: 3 EACH | Refills: 3 | Status: SHIPPED | OUTPATIENT
Start: 2018-03-01 | End: 2019-03-01

## 2018-03-02 ENCOUNTER — LAB SERVICES (OUTPATIENT)
Dept: LAB | Age: 63
End: 2018-03-02

## 2018-03-02 DIAGNOSIS — D80.1 HYPOGAMMAGLOBULINEMIA (CMD): ICD-10-CM

## 2018-03-02 LAB — CREAT SERPL-MCNC: 1.41 MG/DL (ref 0.51–0.95)

## 2018-03-02 PROCEDURE — 82784 ASSAY IGA/IGD/IGG/IGM EACH: CPT | Performed by: INTERNAL MEDICINE

## 2018-03-02 PROCEDURE — 82565 ASSAY OF CREATININE: CPT | Performed by: INTERNAL MEDICINE

## 2018-03-02 PROCEDURE — 36415 COLL VENOUS BLD VENIPUNCTURE: CPT | Performed by: INTERNAL MEDICINE

## 2018-03-05 ENCOUNTER — IMAGING SERVICES (OUTPATIENT)
Dept: ULTRASOUND IMAGING | Age: 63
End: 2018-03-05
Attending: INTERNAL MEDICINE

## 2018-03-05 ENCOUNTER — TELEPHONE (OUTPATIENT)
Dept: INTERNAL MEDICINE | Age: 63
End: 2018-03-05

## 2018-03-05 ENCOUNTER — OFFICE VISIT (OUTPATIENT)
Dept: INTERNAL MEDICINE | Age: 63
End: 2018-03-05

## 2018-03-05 VITALS
BODY MASS INDEX: 46.26 KG/M2 | HEART RATE: 84 BPM | DIASTOLIC BLOOD PRESSURE: 64 MMHG | SYSTOLIC BLOOD PRESSURE: 120 MMHG | WEIGHT: 278 LBS

## 2018-03-05 DIAGNOSIS — R60.0 LEG EDEMA, LEFT: Primary | ICD-10-CM

## 2018-03-05 DIAGNOSIS — R60.0 LEG EDEMA, LEFT: ICD-10-CM

## 2018-03-05 DIAGNOSIS — M79.605 LEFT LEG PAIN: ICD-10-CM

## 2018-03-05 LAB
IGA SERPL-MCNC: 207 MG/DL (ref 82–453)
IGG SERPL-MCNC: 884 MG/DL (ref 751–1560)
IGM SERPL-MCNC: 41 MG/DL (ref 46–304)

## 2018-03-05 PROCEDURE — 99213 OFFICE O/P EST LOW 20 MIN: CPT | Performed by: INTERNAL MEDICINE

## 2018-03-05 PROCEDURE — 93971 EXTREMITY STUDY: CPT | Performed by: RADIOLOGY

## 2018-03-05 RX ORDER — FLUCONAZOLE 100 MG/1
100 TABLET ORAL 2 TIMES DAILY
Qty: 14 TABLET | Refills: 0 | Status: SHIPPED | OUTPATIENT
Start: 2018-03-05 | End: 2018-03-15 | Stop reason: SDUPTHER

## 2018-03-06 RX ORDER — BENZONATATE 100 MG/1
100-200 CAPSULE ORAL 3 TIMES DAILY PRN
Qty: 60 CAPSULE | Refills: 0 | Status: SHIPPED | OUTPATIENT
Start: 2018-03-06 | End: 2018-04-06 | Stop reason: SDUPTHER

## 2018-03-07 ENCOUNTER — E-ADVICE (OUTPATIENT)
Dept: ALLERGY | Age: 63
End: 2018-03-07

## 2018-03-08 RX ORDER — FLUTICASONE PROPIONATE 50 MCG
2 SPRAY, SUSPENSION (ML) NASAL DAILY
Qty: 48 G | Refills: 3 | Status: SHIPPED | OUTPATIENT
Start: 2018-03-08 | End: 2019-01-16 | Stop reason: ALTCHOICE

## 2018-03-12 ENCOUNTER — E-ADVICE (OUTPATIENT)
Dept: NEPHROLOGY | Age: 63
End: 2018-03-12

## 2018-03-14 ENCOUNTER — OFFICE VISIT (OUTPATIENT)
Dept: NEPHROLOGY | Age: 63
End: 2018-03-14

## 2018-03-14 VITALS
SYSTOLIC BLOOD PRESSURE: 126 MMHG | WEIGHT: 279 LBS | DIASTOLIC BLOOD PRESSURE: 84 MMHG | BODY MASS INDEX: 46.43 KG/M2 | HEART RATE: 88 BPM

## 2018-03-14 DIAGNOSIS — N20.0 NEPHROLITHIASIS: ICD-10-CM

## 2018-03-14 DIAGNOSIS — I10 ESSENTIAL HYPERTENSION WITH GOAL BLOOD PRESSURE LESS THAN 140/90: ICD-10-CM

## 2018-03-14 DIAGNOSIS — N18.30 CKD (CHRONIC KIDNEY DISEASE), STAGE III (CMD): ICD-10-CM

## 2018-03-14 DIAGNOSIS — R60.0 EDEMA OF BOTH LEGS: Primary | ICD-10-CM

## 2018-03-14 PROCEDURE — 99214 OFFICE O/P EST MOD 30 MIN: CPT | Performed by: INTERNAL MEDICINE

## 2018-03-14 RX ORDER — METOLAZONE 2.5 MG/1
2.5 TABLET ORAL DAILY
Qty: 30 TABLET | Refills: 0 | Status: SHIPPED | OUTPATIENT
Start: 2018-03-14 | End: 2018-03-23 | Stop reason: ALTCHOICE

## 2018-03-15 ENCOUNTER — OFFICE VISIT (OUTPATIENT)
Dept: INTERNAL MEDICINE | Age: 63
End: 2018-03-15

## 2018-03-15 ENCOUNTER — TELEPHONE (OUTPATIENT)
Dept: CHIROPRACTIC MEDICINE | Age: 63
End: 2018-03-15

## 2018-03-15 VITALS — SYSTOLIC BLOOD PRESSURE: 130 MMHG | HEART RATE: 88 BPM | DIASTOLIC BLOOD PRESSURE: 84 MMHG | TEMPERATURE: 97.6 F

## 2018-03-15 DIAGNOSIS — L03.115 CELLULITIS OF RIGHT LOWER LEG: Primary | ICD-10-CM

## 2018-03-15 DIAGNOSIS — S76.312A LEFT HAMSTRING MUSCLE STRAIN, INITIAL ENCOUNTER: ICD-10-CM

## 2018-03-15 PROCEDURE — 99213 OFFICE O/P EST LOW 20 MIN: CPT | Performed by: INTERNAL MEDICINE

## 2018-03-15 RX ORDER — FLUCONAZOLE 100 MG/1
100 TABLET ORAL 2 TIMES DAILY
Qty: 14 TABLET | Refills: 0 | Status: SHIPPED | OUTPATIENT
Start: 2018-03-15 | End: 2018-03-22

## 2018-03-15 RX ORDER — LEVOFLOXACIN 500 MG/1
500 TABLET, FILM COATED ORAL DAILY
Qty: 5 TABLET | Refills: 0 | Status: SHIPPED | OUTPATIENT
Start: 2018-03-15 | End: 2018-03-16

## 2018-03-15 RX ORDER — GABAPENTIN 300 MG/1
CAPSULE ORAL
Qty: 180 CAPSULE | Refills: 3 | Status: SHIPPED | COMMUNITY
Start: 2018-03-15 | End: 2018-04-23 | Stop reason: SDUPTHER

## 2018-03-16 RX ORDER — LEVOFLOXACIN 500 MG/1
500 TABLET, FILM COATED ORAL DAILY
Qty: 10 TABLET | Refills: 0 | Status: SHIPPED | OUTPATIENT
Start: 2018-03-16 | End: 2018-03-23 | Stop reason: ALTCHOICE

## 2018-03-19 ENCOUNTER — OFFICE VISIT (OUTPATIENT)
Dept: INTERNAL MEDICINE | Age: 63
End: 2018-03-19

## 2018-03-19 ENCOUNTER — APPOINTMENT (OUTPATIENT)
Dept: PULMONOLOGY | Age: 63
End: 2018-03-19

## 2018-03-19 VITALS — SYSTOLIC BLOOD PRESSURE: 132 MMHG | TEMPERATURE: 97.6 F | HEART RATE: 111 BPM | DIASTOLIC BLOOD PRESSURE: 86 MMHG

## 2018-03-19 DIAGNOSIS — L03.115 BILATERAL LOWER LEG CELLULITIS: Primary | ICD-10-CM

## 2018-03-19 DIAGNOSIS — L03.116 BILATERAL LOWER LEG CELLULITIS: Primary | ICD-10-CM

## 2018-03-19 PROCEDURE — 99213 OFFICE O/P EST LOW 20 MIN: CPT | Performed by: INTERNAL MEDICINE

## 2018-03-19 RX ORDER — ONDANSETRON 4 MG/1
4 TABLET, FILM COATED ORAL EVERY 8 HOURS PRN
Qty: 20 TABLET | Refills: 0 | Status: SHIPPED | OUTPATIENT
Start: 2018-03-19 | End: 2018-05-29 | Stop reason: SDUPTHER

## 2018-03-19 RX ORDER — CLARITHROMYCIN 500 MG/1
500 TABLET, COATED ORAL 2 TIMES DAILY
Qty: 20 TABLET | Refills: 0 | Status: SHIPPED | OUTPATIENT
Start: 2018-03-19 | End: 2018-06-19 | Stop reason: SDUPTHER

## 2018-03-21 ENCOUNTER — LAB SERVICES (OUTPATIENT)
Dept: LAB | Age: 63
End: 2018-03-21

## 2018-03-21 ENCOUNTER — TELEPHONE (OUTPATIENT)
Dept: ALLERGY | Age: 63
End: 2018-03-21

## 2018-03-21 ENCOUNTER — E-ADVICE (OUTPATIENT)
Dept: ALLERGY | Age: 63
End: 2018-03-21

## 2018-03-21 ENCOUNTER — OFFICE VISIT (OUTPATIENT)
Dept: ALLERGY | Age: 63
End: 2018-03-21

## 2018-03-21 ENCOUNTER — IMAGING SERVICES (OUTPATIENT)
Dept: GENERAL RADIOLOGY | Age: 63
End: 2018-03-21
Attending: ALLERGY & IMMUNOLOGY

## 2018-03-21 VITALS
SYSTOLIC BLOOD PRESSURE: 118 MMHG | BODY MASS INDEX: 45.96 KG/M2 | TEMPERATURE: 97.6 F | HEIGHT: 64 IN | RESPIRATION RATE: 20 BRPM | WEIGHT: 269.18 LBS | DIASTOLIC BLOOD PRESSURE: 62 MMHG | HEART RATE: 96 BPM | OXYGEN SATURATION: 93 %

## 2018-03-21 DIAGNOSIS — Z87.01 HISTORY OF RECURRENT PNEUMONIA: ICD-10-CM

## 2018-03-21 DIAGNOSIS — N18.30 CKD (CHRONIC KIDNEY DISEASE), STAGE III (CMD): ICD-10-CM

## 2018-03-21 DIAGNOSIS — D80.1 HYPOGAMMAGLOBULINEMIA (CMD): Primary | ICD-10-CM

## 2018-03-21 DIAGNOSIS — N20.0 NEPHROLITHIASIS: ICD-10-CM

## 2018-03-21 DIAGNOSIS — R60.0 EDEMA OF BOTH LEGS: ICD-10-CM

## 2018-03-21 LAB
ALBUMIN SERPL-MCNC: 4 G/DL (ref 3.6–5.1)
ANION GAP SERPL CALC-SCNC: 16 MMOL/L (ref 10–20)
BNP SERPL-MCNC: 71 PG/ML
BUN SERPL-MCNC: 16 MG/DL (ref 6–20)
BUN/CREAT SERPL: 9 (ref 7–25)
CALCIUM SERPL-MCNC: 9.1 MG/DL (ref 8.4–10.2)
CHLORIDE SERPL-SCNC: 80 MMOL/L (ref 98–107)
CO2 SERPL-SCNC: 34 MMOL/L (ref 21–32)
CREAT SERPL-MCNC: 1.78 MG/DL (ref 0.51–0.95)
FASTING STATUS PATIENT QL REPORTED: 0 HRS
GLUCOSE SERPL-MCNC: 141 MG/DL (ref 65–99)
MAGNESIUM SERPL-MCNC: 2 MG/DL (ref 1.7–2.4)
PHOSPHATE SERPL-MCNC: 4.7 MG/DL (ref 2.4–4.7)
POTASSIUM SERPL-SCNC: 2.8 MMOL/L (ref 3.4–5.1)
SODIUM SERPL-SCNC: 127 MMOL/L (ref 135–145)

## 2018-03-21 PROCEDURE — 36415 COLL VENOUS BLD VENIPUNCTURE: CPT | Performed by: INTERNAL MEDICINE

## 2018-03-21 PROCEDURE — 99214 OFFICE O/P EST MOD 30 MIN: CPT | Performed by: ALLERGY & IMMUNOLOGY

## 2018-03-21 PROCEDURE — 83880 ASSAY OF NATRIURETIC PEPTIDE: CPT | Performed by: INTERNAL MEDICINE

## 2018-03-21 PROCEDURE — 83735 ASSAY OF MAGNESIUM: CPT | Performed by: INTERNAL MEDICINE

## 2018-03-21 PROCEDURE — 80069 RENAL FUNCTION PANEL: CPT | Performed by: INTERNAL MEDICINE

## 2018-03-21 PROCEDURE — 71046 X-RAY EXAM CHEST 2 VIEWS: CPT | Performed by: RADIOLOGY

## 2018-03-22 ENCOUNTER — TELEPHONE (OUTPATIENT)
Dept: INTERNAL MEDICINE | Age: 63
End: 2018-03-22

## 2018-03-22 DIAGNOSIS — N18.30 CKD (CHRONIC KIDNEY DISEASE), STAGE III (CMD): Primary | ICD-10-CM

## 2018-03-23 ENCOUNTER — APPOINTMENT (OUTPATIENT)
Dept: REHABILITATION | Age: 63
End: 2018-03-23
Attending: ORTHOPAEDIC SURGERY

## 2018-03-23 ENCOUNTER — OFFICE VISIT (OUTPATIENT)
Dept: INTERNAL MEDICINE | Age: 63
End: 2018-03-23

## 2018-03-23 VITALS
SYSTOLIC BLOOD PRESSURE: 110 MMHG | HEART RATE: 88 BPM | BODY MASS INDEX: 46.38 KG/M2 | DIASTOLIC BLOOD PRESSURE: 70 MMHG | WEIGHT: 266 LBS

## 2018-03-23 DIAGNOSIS — E87.1 HYPONATREMIA: ICD-10-CM

## 2018-03-23 DIAGNOSIS — L03.115 CELLULITIS OF RIGHT LEG: ICD-10-CM

## 2018-03-23 DIAGNOSIS — N18.30 CKD (CHRONIC KIDNEY DISEASE), STAGE III (CMD): Primary | ICD-10-CM

## 2018-03-23 DIAGNOSIS — E87.6 HYPOKALEMIA: ICD-10-CM

## 2018-03-23 PROCEDURE — 99213 OFFICE O/P EST LOW 20 MIN: CPT | Performed by: INTERNAL MEDICINE

## 2018-03-26 ENCOUNTER — LAB SERVICES (OUTPATIENT)
Dept: LAB | Age: 63
End: 2018-03-26

## 2018-03-26 DIAGNOSIS — N18.30 CKD (CHRONIC KIDNEY DISEASE), STAGE III (CMD): ICD-10-CM

## 2018-03-26 PROCEDURE — 36415 COLL VENOUS BLD VENIPUNCTURE: CPT | Performed by: INTERNAL MEDICINE

## 2018-03-26 PROCEDURE — 80048 BASIC METABOLIC PNL TOTAL CA: CPT | Performed by: INTERNAL MEDICINE

## 2018-03-27 ENCOUNTER — APPOINTMENT (OUTPATIENT)
Dept: REHABILITATION | Age: 63
End: 2018-03-27
Attending: ORTHOPAEDIC SURGERY

## 2018-03-27 LAB
ANION GAP SERPL CALC-SCNC: 7 MMOL/L (ref 10–20)
BUN SERPL-MCNC: 24 MG/DL (ref 6–20)
BUN/CREAT SERPL: 14 (ref 7–25)
CALCIUM SERPL-MCNC: 9.7 MG/DL (ref 8.4–10.2)
CHLORIDE SERPL-SCNC: 81 MMOL/L (ref 98–107)
CO2 SERPL-SCNC: 44 MMOL/L (ref 21–32)
CREAT SERPL-MCNC: 1.75 MG/DL (ref 0.51–0.95)
FASTING STATUS PATIENT QL REPORTED: 4 HRS
GLUCOSE SERPL-MCNC: 177 MG/DL (ref 65–99)
POTASSIUM SERPL-SCNC: 3 MMOL/L (ref 3.4–5.1)
SODIUM SERPL-SCNC: 129 MMOL/L (ref 135–145)

## 2018-03-29 ENCOUNTER — NURSE ONLY (OUTPATIENT)
Dept: PULMONOLOGY | Age: 63
End: 2018-03-29

## 2018-03-29 ENCOUNTER — APPOINTMENT (OUTPATIENT)
Dept: REHABILITATION | Age: 63
End: 2018-03-29
Attending: ORTHOPAEDIC SURGERY

## 2018-03-29 ENCOUNTER — LAB SERVICES (OUTPATIENT)
Dept: LAB | Age: 63
End: 2018-03-29

## 2018-03-29 DIAGNOSIS — J45.50 SEVERE PERSISTENT ASTHMA, UNSPECIFIED WHETHER COMPLICATED: Primary | ICD-10-CM

## 2018-03-29 DIAGNOSIS — N18.30 CKD (CHRONIC KIDNEY DISEASE), STAGE III (CMD): ICD-10-CM

## 2018-03-29 LAB
ANION GAP SERPL CALC-SCNC: 9 MMOL/L (ref 10–20)
BUN SERPL-MCNC: 20 MG/DL (ref 6–20)
BUN/CREAT SERPL: 12 (ref 7–25)
CALCIUM SERPL-MCNC: 9.5 MG/DL (ref 8.4–10.2)
CHLORIDE SERPL-SCNC: 82 MMOL/L (ref 98–107)
CO2 SERPL-SCNC: 38 MMOL/L (ref 21–32)
CREAT SERPL-MCNC: 1.71 MG/DL (ref 0.51–0.95)
FASTING STATUS PATIENT QL REPORTED: 2.5 HRS
GLUCOSE SERPL-MCNC: 167 MG/DL (ref 65–99)
POTASSIUM SERPL-SCNC: 3.5 MMOL/L (ref 3.4–5.1)
SODIUM SERPL-SCNC: 125 MMOL/L (ref 135–145)

## 2018-03-29 PROCEDURE — 96372 THER/PROPH/DIAG INJ SC/IM: CPT | Performed by: INTERNAL MEDICINE

## 2018-03-29 PROCEDURE — 36415 COLL VENOUS BLD VENIPUNCTURE: CPT | Performed by: INTERNAL MEDICINE

## 2018-03-29 PROCEDURE — 80048 BASIC METABOLIC PNL TOTAL CA: CPT | Performed by: INTERNAL MEDICINE

## 2018-03-30 ENCOUNTER — TELEPHONE (OUTPATIENT)
Dept: INTERNAL MEDICINE | Age: 63
End: 2018-03-30

## 2018-04-03 ENCOUNTER — APPOINTMENT (OUTPATIENT)
Dept: REHABILITATION | Age: 63
End: 2018-04-03
Attending: ORTHOPAEDIC SURGERY

## 2018-04-05 ENCOUNTER — BEHAVIORAL HEALTH (OUTPATIENT)
Dept: BEHAVIORAL HEALTH | Age: 63
End: 2018-04-05

## 2018-04-05 ENCOUNTER — TELEPHONE (OUTPATIENT)
Dept: INTERNAL MEDICINE | Age: 63
End: 2018-04-05

## 2018-04-05 DIAGNOSIS — E87.6 HYPOKALEMIA: Primary | ICD-10-CM

## 2018-04-05 DIAGNOSIS — F32.A DEPRESSION, UNSPECIFIED DEPRESSION TYPE: Primary | ICD-10-CM

## 2018-04-05 DIAGNOSIS — N18.30 CKD (CHRONIC KIDNEY DISEASE), STAGE III (CMD): ICD-10-CM

## 2018-04-05 DIAGNOSIS — F41.9 ANXIETY DISORDER, UNSPECIFIED TYPE: ICD-10-CM

## 2018-04-05 PROCEDURE — 90791 PSYCH DIAGNOSTIC EVALUATION: CPT | Performed by: COUNSELOR

## 2018-04-06 ENCOUNTER — APPOINTMENT (OUTPATIENT)
Dept: REHABILITATION | Age: 63
End: 2018-04-06
Attending: ORTHOPAEDIC SURGERY

## 2018-04-06 ENCOUNTER — TELEPHONE (OUTPATIENT)
Dept: CHIROPRACTIC MEDICINE | Age: 63
End: 2018-04-06

## 2018-04-06 ENCOUNTER — CLINICAL ABSTRACT (OUTPATIENT)
Dept: OTHER | Age: 63
End: 2018-04-06

## 2018-04-06 RX ORDER — BENZONATATE 100 MG/1
100-200 CAPSULE ORAL 3 TIMES DAILY PRN
Qty: 60 CAPSULE | Refills: 0 | Status: SHIPPED | OUTPATIENT
Start: 2018-04-06 | End: 2018-05-09 | Stop reason: SDUPTHER

## 2018-04-09 ENCOUNTER — E-ADVICE (OUTPATIENT)
Dept: ALLERGY | Age: 63
End: 2018-04-09

## 2018-04-09 ENCOUNTER — LAB SERVICES (OUTPATIENT)
Dept: LAB | Age: 63
End: 2018-04-09

## 2018-04-09 DIAGNOSIS — N18.30 CKD (CHRONIC KIDNEY DISEASE), STAGE III (CMD): ICD-10-CM

## 2018-04-09 DIAGNOSIS — E87.6 HYPOKALEMIA: ICD-10-CM

## 2018-04-09 LAB
ANION GAP SERPL CALC-SCNC: 13 MMOL/L (ref 10–20)
BUN SERPL-MCNC: 14 MG/DL (ref 6–20)
BUN/CREAT SERPL: 11 (ref 7–25)
CALCIUM SERPL-MCNC: 9 MG/DL (ref 8.4–10.2)
CHLORIDE SERPL-SCNC: 100 MMOL/L (ref 98–107)
CO2 SERPL-SCNC: 29 MMOL/L (ref 21–32)
CREAT SERPL-MCNC: 1.29 MG/DL (ref 0.51–0.95)
FASTING STATUS PATIENT QL REPORTED: 0 HRS
GLUCOSE SERPL-MCNC: 154 MG/DL (ref 65–99)
POTASSIUM SERPL-SCNC: 4.7 MMOL/L (ref 3.4–5.1)
SODIUM SERPL-SCNC: 137 MMOL/L (ref 135–145)

## 2018-04-09 PROCEDURE — 36415 COLL VENOUS BLD VENIPUNCTURE: CPT | Performed by: INTERNAL MEDICINE

## 2018-04-09 PROCEDURE — 80048 BASIC METABOLIC PNL TOTAL CA: CPT | Performed by: INTERNAL MEDICINE

## 2018-04-10 ENCOUNTER — OFFICE VISIT (OUTPATIENT)
Dept: ORTHOPEDICS | Age: 63
End: 2018-04-10

## 2018-04-10 ENCOUNTER — TELEPHONE (OUTPATIENT)
Dept: INTERNAL MEDICINE | Age: 63
End: 2018-04-10

## 2018-04-10 ENCOUNTER — IMAGING SERVICES (OUTPATIENT)
Dept: GENERAL RADIOLOGY | Age: 63
End: 2018-04-10
Attending: ORTHOPAEDIC SURGERY

## 2018-04-10 ENCOUNTER — APPOINTMENT (OUTPATIENT)
Dept: REHABILITATION | Age: 63
End: 2018-04-10
Attending: ORTHOPAEDIC SURGERY

## 2018-04-10 VITALS
WEIGHT: 264.55 LBS | HEIGHT: 64 IN | BODY MASS INDEX: 45.17 KG/M2 | DIASTOLIC BLOOD PRESSURE: 84 MMHG | SYSTOLIC BLOOD PRESSURE: 132 MMHG

## 2018-04-10 DIAGNOSIS — M25.562 LEFT KNEE PAIN, UNSPECIFIED CHRONICITY: Primary | ICD-10-CM

## 2018-04-10 DIAGNOSIS — Z96.659 STATUS POST TOTAL KNEE REPLACEMENT, UNSPECIFIED LATERALITY: ICD-10-CM

## 2018-04-10 DIAGNOSIS — M25.562 LEFT KNEE PAIN, UNSPECIFIED CHRONICITY: ICD-10-CM

## 2018-04-10 DIAGNOSIS — Z96.651 STATUS POST RIGHT PARTIAL KNEE REPLACEMENT: ICD-10-CM

## 2018-04-10 PROCEDURE — 20610 DRAIN/INJ JOINT/BURSA W/O US: CPT | Performed by: ORTHOPAEDIC SURGERY

## 2018-04-10 PROCEDURE — 73564 X-RAY EXAM KNEE 4 OR MORE: CPT | Performed by: RADIOLOGY

## 2018-04-10 PROCEDURE — 99213 OFFICE O/P EST LOW 20 MIN: CPT | Performed by: ORTHOPAEDIC SURGERY

## 2018-04-10 RX ORDER — BETAMETHASONE SODIUM PHOSPHATE AND BETAMETHASONE ACETATE 3; 3 MG/ML; MG/ML
6 INJECTION, SUSPENSION INTRA-ARTICULAR; INTRALESIONAL; INTRAMUSCULAR; SOFT TISSUE ONCE
Status: COMPLETED | OUTPATIENT
Start: 2018-04-10 | End: 2018-04-10

## 2018-04-10 RX ADMIN — BETAMETHASONE SODIUM PHOSPHATE AND BETAMETHASONE ACETATE 6 MG: 3; 3 INJECTION, SUSPENSION INTRA-ARTICULAR; INTRALESIONAL; INTRAMUSCULAR; SOFT TISSUE at 14:24

## 2018-04-12 ENCOUNTER — BEHAVIORAL HEALTH (OUTPATIENT)
Dept: BEHAVIORAL HEALTH | Age: 63
End: 2018-04-12

## 2018-04-12 ENCOUNTER — TELEPHONE (OUTPATIENT)
Dept: ALLERGY | Age: 63
End: 2018-04-12

## 2018-04-12 ENCOUNTER — APPOINTMENT (OUTPATIENT)
Dept: REHABILITATION | Age: 63
End: 2018-04-12
Attending: ORTHOPAEDIC SURGERY

## 2018-04-12 DIAGNOSIS — F32.A DEPRESSIVE DISORDER: Primary | ICD-10-CM

## 2018-04-12 DIAGNOSIS — F41.9 ANXIETY DISORDER, UNSPECIFIED TYPE: ICD-10-CM

## 2018-04-12 PROCEDURE — 90834 PSYTX W PT 45 MINUTES: CPT | Performed by: COUNSELOR

## 2018-04-16 ENCOUNTER — E-ADVICE (OUTPATIENT)
Dept: INTERNAL MEDICINE | Age: 63
End: 2018-04-16

## 2018-04-23 ENCOUNTER — E-ADVICE (OUTPATIENT)
Dept: ORTHOPEDICS | Age: 63
End: 2018-04-23

## 2018-04-23 ENCOUNTER — E-ADVICE (OUTPATIENT)
Dept: INTERNAL MEDICINE | Age: 63
End: 2018-04-23

## 2018-04-23 DIAGNOSIS — M17.12 ARTHRITIS OF LEFT KNEE: Primary | ICD-10-CM

## 2018-04-23 DIAGNOSIS — G89.29 CHRONIC PAIN OF LEFT KNEE: ICD-10-CM

## 2018-04-23 DIAGNOSIS — M25.562 CHRONIC PAIN OF LEFT KNEE: ICD-10-CM

## 2018-04-23 RX ORDER — GABAPENTIN 300 MG/1
300 CAPSULE ORAL NIGHTLY
Qty: 180 CAPSULE | Refills: 3 | Status: SHIPPED | OUTPATIENT
Start: 2018-04-23 | End: 2018-10-23 | Stop reason: SDUPTHER

## 2018-04-23 RX ORDER — AZITHROMYCIN 250 MG/1
500 TABLET, FILM COATED ORAL DAILY
Qty: 6 TABLET | Refills: 0 | Status: SHIPPED | OUTPATIENT
Start: 2018-04-23 | End: 2018-05-29 | Stop reason: ALTCHOICE

## 2018-04-23 RX ORDER — GABAPENTIN 100 MG/1
100 CAPSULE ORAL 3 TIMES DAILY
Qty: 270 CAPSULE | Refills: 3 | Status: SHIPPED | OUTPATIENT
Start: 2018-04-23 | End: 2018-06-22 | Stop reason: DRUGHIGH

## 2018-04-23 RX ORDER — AZITHROMYCIN 500 MG/1
500 TABLET, FILM COATED ORAL DAILY
Qty: 3 TABLET | Refills: 0 | Status: SHIPPED | OUTPATIENT
Start: 2018-04-23 | End: 2018-04-23 | Stop reason: SDUPTHER

## 2018-04-25 ENCOUNTER — BEHAVIORAL HEALTH (OUTPATIENT)
Dept: BEHAVIORAL HEALTH | Age: 63
End: 2018-04-25

## 2018-04-25 DIAGNOSIS — F41.1 GENERALIZED ANXIETY DISORDER: ICD-10-CM

## 2018-04-25 DIAGNOSIS — F43.23 ADJUSTMENT DISORDER WITH MIXED ANXIETY AND DEPRESSED MOOD: ICD-10-CM

## 2018-04-25 DIAGNOSIS — F33.1 MAJOR DEPRESSIVE DISORDER, RECURRENT EPISODE, MODERATE (CMD): Primary | ICD-10-CM

## 2018-04-25 PROCEDURE — 90792 PSYCH DIAG EVAL W/MED SRVCS: CPT | Performed by: PSYCHIATRY & NEUROLOGY

## 2018-04-25 RX ORDER — DULOXETIN HYDROCHLORIDE 60 MG/1
60 CAPSULE, DELAYED RELEASE ORAL 2 TIMES DAILY
Qty: 180 CAPSULE | Refills: 3 | Status: SHIPPED | OUTPATIENT
Start: 2018-04-25 | End: 2019-04-19

## 2018-04-25 RX ORDER — CLONAZEPAM 1 MG/1
1 TABLET ORAL NIGHTLY PRN
Qty: 90 TABLET | Refills: 1 | Status: SHIPPED | OUTPATIENT
Start: 2018-04-25 | End: 2020-06-04

## 2018-04-25 RX ORDER — HYDROXYZINE 50 MG/1
TABLET, FILM COATED ORAL
Qty: 180 TABLET | Refills: 3 | Status: SHIPPED | OUTPATIENT
Start: 2018-04-25 | End: 2020-06-04 | Stop reason: SDUPTHER

## 2018-04-25 RX ORDER — BUSPIRONE HYDROCHLORIDE 30 MG/1
30 TABLET ORAL 2 TIMES DAILY
Qty: 180 TABLET | Refills: 3 | Status: SHIPPED | OUTPATIENT
Start: 2018-04-25 | End: 2019-01-16 | Stop reason: DRUGHIGH

## 2018-04-25 RX ORDER — BUPROPION HYDROCHLORIDE 150 MG/1
TABLET ORAL
Qty: 180 TABLET | Refills: 3 | Status: SHIPPED | OUTPATIENT
Start: 2018-04-25 | End: 2019-03-12 | Stop reason: ALTCHOICE

## 2018-04-25 RX ORDER — PRAZOSIN HYDROCHLORIDE 2 MG/1
2 CAPSULE ORAL 3 TIMES DAILY
COMMUNITY
End: 2018-04-26 | Stop reason: SDUPTHER

## 2018-04-25 ASSESSMENT — GERIATRIC DEPRESSION SCALE (GDS)
ARE YOU IN GOOD SPIRITS MOST OF THE TIME: NO
DO YOU OFTEN FEEL HELPLESS: YES
DO YOU FEEL PRETTY WORTHLESS THE WAY YOU ARE NOW: YES
DO YOU FREQUENTLY FEEL LIKE CRYING: YES
ARE YOU AFRAID THAT SOMETHING BAD IS GOING TO HAPPEN TO YOU: YES
DO YOU ENJOY GETTING UP IN THE MORNING: NO
DO YOU WORRY A LOT ABOUT THE PAST: YES
DO YOU FREQUENTLY GET UPSET OVER LITTLE THINGS: NO
DO YOU FEEL THAT YOUR SITUATION IS HOPELESS: YES
DO YOU THINK IT IS WONDERFUL TO BE ALIVE NOW: NO
DO YOU OFTEN GET BORED: YES
DO YOU FEEL THAT YOUR LIFE IS EMPTY: YES
ARE YOU BASICALLY SATISFIED WITH YOUR LIFE: YES
ARE YOU BOTHERED BY THOUGHTS YOU CAN T GET OUT OF YOUR HEAD: YES
ARE YOU HOPEFUL ABOUT THE FUTURE: NO
DO YOU FIND LIFE VERY EXCITING: NO
DO YOU PREFER TO AVOID SOCIAL GATHERINGS: YES
IS IT EASY FOR YOU TO MAKE DECISIONS?: NO
DO YOU THINK THAT MOST PEOPLE ARE BETTER OFF THAN YOU ARE: YES
DO YOU OFTEN GET RESTLESS AND FIDGETY: YES
IS IT HARD FOR YOU TO GET STARTED ON NEW PROJECTS?: YES
DO YOU FEEL YOU HAVE MORE PROBLEMS WITH MEMORY THAN MOST: YES
DO YOU FEEL HAPPY MOST OF THE TIME: NO
IS YOUR MIND AS CLEAR AS IT USED TO BE?: NO
DO YOU FEEL FULL OF ENERGY: NO
DO YOU PREFER TO STAY AT HOME, RATHER THAN GOING OUT AND DOING NEW THINGS: YES
HAVE YOU DROPPED MANY OF YOUR ACTIVITIES AND INTERESTS?: YES
GDS TOTAL SCORE: 27
DO YOU OFTEN FEEL DOWNHEARTED AND BLUE: YES
DO YOU FREQUENTLY WORRY ABOUT THE FUTURE: NO

## 2018-04-26 ENCOUNTER — APPOINTMENT (OUTPATIENT)
Dept: PULMONOLOGY | Age: 63
End: 2018-04-26

## 2018-04-26 ENCOUNTER — TELEPHONE (OUTPATIENT)
Dept: CHIROPRACTIC MEDICINE | Age: 63
End: 2018-04-26

## 2018-04-26 ENCOUNTER — E-ADVICE (OUTPATIENT)
Dept: INTERNAL MEDICINE | Age: 63
End: 2018-04-26

## 2018-04-26 RX ORDER — EPINEPHRINE 0.3 MG/.3ML
0.3 INJECTION SUBCUTANEOUS
Qty: 2 EACH | Refills: 0 | Status: SHIPPED | OUTPATIENT
Start: 2018-04-26 | End: 2019-04-11 | Stop reason: SDUPTHER

## 2018-04-26 RX ORDER — PRAZOSIN HYDROCHLORIDE 2 MG/1
CAPSULE ORAL
Status: SHIPPED | COMMUNITY
Start: 2018-04-26 | End: 2018-05-23 | Stop reason: DRUGHIGH

## 2018-04-30 RX ORDER — POLYETHYLENE GLYCOL 3350 17 G/17G
POWDER, FOR SOLUTION ORAL
Qty: 3162 G | Refills: 0 | Status: SHIPPED | OUTPATIENT
Start: 2018-04-30 | End: 2018-09-28 | Stop reason: SDUPTHER

## 2018-05-01 ENCOUNTER — TELEPHONE (OUTPATIENT)
Dept: BEHAVIORAL HEALTH | Age: 63
End: 2018-05-01

## 2018-05-03 ENCOUNTER — OFFICE VISIT (OUTPATIENT)
Dept: ORTHOPEDICS | Age: 63
End: 2018-05-03

## 2018-05-03 ENCOUNTER — NURSE ONLY (OUTPATIENT)
Dept: PULMONOLOGY | Age: 63
End: 2018-05-03

## 2018-05-03 ENCOUNTER — TELEPHONE (OUTPATIENT)
Dept: INTERNAL MEDICINE | Age: 63
End: 2018-05-03

## 2018-05-03 DIAGNOSIS — G89.29 CHRONIC PAIN OF LEFT KNEE: ICD-10-CM

## 2018-05-03 DIAGNOSIS — R05.8 PRODUCTIVE COUGH: Primary | ICD-10-CM

## 2018-05-03 DIAGNOSIS — M17.12 ARTHRITIS OF LEFT KNEE: Primary | ICD-10-CM

## 2018-05-03 DIAGNOSIS — M25.562 CHRONIC PAIN OF LEFT KNEE: ICD-10-CM

## 2018-05-03 DIAGNOSIS — J45.50 SEVERE PERSISTENT ASTHMA, UNSPECIFIED WHETHER COMPLICATED: ICD-10-CM

## 2018-05-03 LAB
IGA SERPL-MCNC: 172 MG/DL (ref 87–352)
IGG SERPL-MCNC: 826 MG/DL (ref 700–1600)
IGM SERPL-MCNC: 35 MG/DL (ref 26–217)

## 2018-05-03 PROCEDURE — 96372 THER/PROPH/DIAG INJ SC/IM: CPT | Performed by: INTERNAL MEDICINE

## 2018-05-03 PROCEDURE — 20610 DRAIN/INJ JOINT/BURSA W/O US: CPT | Performed by: PHYSICIAN ASSISTANT

## 2018-05-04 ENCOUNTER — TELEPHONE (OUTPATIENT)
Dept: SURGERY | Age: 63
End: 2018-05-04

## 2018-05-04 DIAGNOSIS — T82.598A: Primary | ICD-10-CM

## 2018-05-09 ENCOUNTER — HOSPITAL ENCOUNTER (OUTPATIENT)
Dept: INTERVENTIONAL RADIOLOGY/VASCULAR | Age: 63
Discharge: HOME | End: 2018-05-09
Attending: SURGERY

## 2018-05-09 ENCOUNTER — HOSPITAL ENCOUNTER (OUTPATIENT)
Dept: LAB | Age: 63
Discharge: HOME | End: 2018-05-09
Attending: SURGERY

## 2018-05-09 VITALS
RESPIRATION RATE: 16 BRPM | OXYGEN SATURATION: 95 % | DIASTOLIC BLOOD PRESSURE: 79 MMHG | SYSTOLIC BLOOD PRESSURE: 163 MMHG | HEART RATE: 88 BPM

## 2018-05-09 DIAGNOSIS — R05.8 PRODUCTIVE COUGH: ICD-10-CM

## 2018-05-09 DIAGNOSIS — T82.598A: ICD-10-CM

## 2018-05-09 PROCEDURE — 10002800 HB RX 250 W HCPCS: Performed by: RADIOLOGY

## 2018-05-09 PROCEDURE — 36598 INJ W/FLUOR EVAL CV DEVICE: CPT | Performed by: RADIOLOGY

## 2018-05-09 PROCEDURE — 87205 SMEAR GRAM STAIN: CPT

## 2018-05-09 PROCEDURE — 36598 INJ W/FLUOR EVAL CV DEVICE: CPT

## 2018-05-09 PROCEDURE — 10002805 HB CONTRAST AGENT: Performed by: RADIOLOGY

## 2018-05-09 RX ADMIN — SODIUM CHLORIDE, PRESERVATIVE FREE 5 ML: 5 INJECTION INTRAVENOUS at 10:40

## 2018-05-09 RX ADMIN — IOPAMIDOL 4 ML: 612 INJECTION, SOLUTION INTRAVENOUS at 10:50

## 2018-05-10 ENCOUNTER — OFFICE VISIT (OUTPATIENT)
Dept: ORTHOPEDICS | Age: 63
End: 2018-05-10

## 2018-05-10 DIAGNOSIS — M17.12 PRIMARY OSTEOARTHRITIS OF LEFT KNEE: Primary | ICD-10-CM

## 2018-05-10 DIAGNOSIS — M25.562 CHRONIC PAIN OF LEFT KNEE: ICD-10-CM

## 2018-05-10 DIAGNOSIS — G89.29 CHRONIC PAIN OF LEFT KNEE: ICD-10-CM

## 2018-05-10 PROCEDURE — 20610 DRAIN/INJ JOINT/BURSA W/O US: CPT | Performed by: PHYSICIAN ASSISTANT

## 2018-05-10 RX ORDER — BENZONATATE 100 MG/1
100-200 CAPSULE ORAL 3 TIMES DAILY PRN
Qty: 60 CAPSULE | Refills: 0 | Status: SHIPPED | OUTPATIENT
Start: 2018-05-10 | End: 2018-06-19 | Stop reason: SDUPTHER

## 2018-05-10 RX ORDER — CODEINE PHOSPHATE AND GUAIFENESIN 10; 100 MG/5ML; MG/5ML
5 SOLUTION ORAL 3 TIMES DAILY PRN
Qty: 240 ML | Refills: 0 | Status: SHIPPED | OUTPATIENT
Start: 2018-05-10 | End: 2018-07-12 | Stop reason: SDUPTHER

## 2018-05-11 ENCOUNTER — E-ADVICE (OUTPATIENT)
Dept: ALLERGY | Age: 63
End: 2018-05-11

## 2018-05-11 LAB
BACTERIA SPEC RESP CULT: NORMAL
GRAM STN SPEC: NORMAL
REPORT STATUS (RPT): NORMAL
SPECIMEN SOURCE: NORMAL

## 2018-05-17 ENCOUNTER — TELEPHONE (OUTPATIENT)
Dept: INTERNAL MEDICINE | Age: 63
End: 2018-05-17

## 2018-05-17 ENCOUNTER — OFFICE VISIT (OUTPATIENT)
Dept: ORTHOPEDICS | Age: 63
End: 2018-05-17

## 2018-05-17 ENCOUNTER — E-ADVICE (OUTPATIENT)
Dept: PULMONOLOGY | Age: 63
End: 2018-05-17

## 2018-05-17 DIAGNOSIS — G89.29 CHRONIC PAIN OF LEFT KNEE: ICD-10-CM

## 2018-05-17 DIAGNOSIS — M17.12 PRIMARY OSTEOARTHRITIS OF LEFT KNEE: Primary | ICD-10-CM

## 2018-05-17 DIAGNOSIS — M25.562 CHRONIC PAIN OF LEFT KNEE: ICD-10-CM

## 2018-05-17 PROCEDURE — 20610 DRAIN/INJ JOINT/BURSA W/O US: CPT | Performed by: PHYSICIAN ASSISTANT

## 2018-05-22 ENCOUNTER — TELEPHONE (OUTPATIENT)
Dept: INTERNAL MEDICINE | Age: 63
End: 2018-05-22

## 2018-05-22 ENCOUNTER — OFFICE VISIT (OUTPATIENT)
Dept: INTERNAL MEDICINE | Age: 63
End: 2018-05-22

## 2018-05-22 ENCOUNTER — LAB SERVICES (OUTPATIENT)
Dept: LAB | Age: 63
End: 2018-05-22

## 2018-05-22 VITALS
SYSTOLIC BLOOD PRESSURE: 136 MMHG | WEIGHT: 269.6 LBS | DIASTOLIC BLOOD PRESSURE: 90 MMHG | HEART RATE: 84 BPM | BODY MASS INDEX: 47.01 KG/M2

## 2018-05-22 DIAGNOSIS — S99.922A INJURY OF TOE, LEFT, INITIAL ENCOUNTER: Primary | ICD-10-CM

## 2018-05-22 DIAGNOSIS — R05.9 COUGH: ICD-10-CM

## 2018-05-22 PROCEDURE — 99213 OFFICE O/P EST LOW 20 MIN: CPT | Performed by: INTERNAL MEDICINE

## 2018-05-22 PROCEDURE — 87070 CULTURE OTHR SPECIMN AEROBIC: CPT | Performed by: INTERNAL MEDICINE

## 2018-05-22 PROCEDURE — 87205 SMEAR GRAM STAIN: CPT | Performed by: INTERNAL MEDICINE

## 2018-05-23 ENCOUNTER — OFFICE VISIT (OUTPATIENT)
Dept: NEPHROLOGY | Age: 63
End: 2018-05-23

## 2018-05-23 VITALS
SYSTOLIC BLOOD PRESSURE: 140 MMHG | BODY MASS INDEX: 46.21 KG/M2 | DIASTOLIC BLOOD PRESSURE: 90 MMHG | WEIGHT: 265 LBS | HEART RATE: 68 BPM

## 2018-05-23 DIAGNOSIS — N20.0 CALCULUS OF KIDNEY: ICD-10-CM

## 2018-05-23 DIAGNOSIS — I10 ESSENTIAL HYPERTENSION WITH GOAL BLOOD PRESSURE LESS THAN 140/90: ICD-10-CM

## 2018-05-23 DIAGNOSIS — N18.30 CKD (CHRONIC KIDNEY DISEASE), STAGE III (CMD): Primary | ICD-10-CM

## 2018-05-23 PROCEDURE — 99214 OFFICE O/P EST MOD 30 MIN: CPT | Performed by: INTERNAL MEDICINE

## 2018-05-24 ENCOUNTER — HOSPITAL ENCOUNTER (OUTPATIENT)
Age: 63
Setting detail: OBSERVATION
Discharge: HOME | End: 2018-05-25
Attending: EMERGENCY MEDICINE | Admitting: INTERNAL MEDICINE

## 2018-05-24 ENCOUNTER — SURGERY (OUTPATIENT)
Age: 63
End: 2018-05-24

## 2018-05-24 ENCOUNTER — APPOINTMENT (OUTPATIENT)
Dept: GENERAL RADIOLOGY | Age: 63
End: 2018-05-24
Attending: EMERGENCY MEDICINE

## 2018-05-24 ENCOUNTER — OFF PREMISE CHARGES (OUTPATIENT)
Dept: CARDIOLOGY | Age: 63
End: 2018-05-24

## 2018-05-24 ENCOUNTER — APPOINTMENT (OUTPATIENT)
Dept: CT IMAGING | Age: 63
End: 2018-05-24
Attending: EMERGENCY MEDICINE

## 2018-05-24 DIAGNOSIS — R79.89 ELEVATED TROPONIN: ICD-10-CM

## 2018-05-24 DIAGNOSIS — R07.9 CHEST PAIN, UNSPECIFIED TYPE: Primary | ICD-10-CM

## 2018-05-24 DIAGNOSIS — I21.4 NSTEMI (NON-ST ELEVATED MYOCARDIAL INFARCTION) (CMD): ICD-10-CM

## 2018-05-24 PROBLEM — I20.1 CORONARY ARTERY SPASM (CMD): Status: ACTIVE | Noted: 2018-05-24

## 2018-05-24 LAB
ANION GAP SERPL CALC-SCNC: 9 MMOL/L (ref 10–20)
APTT PPP: 33 SEC (ref 22–30)
ATRIAL RATE (BPM): 112
BASOPHILS # BLD AUTO: 0 K/MCL (ref 0–0.3)
BASOPHILS NFR BLD AUTO: 1 %
BUN SERPL-MCNC: 12 MG/DL (ref 6–20)
BUN/CREAT SERPL: 9 (ref 7–25)
CALCIUM SERPL-MCNC: 8.3 MG/DL (ref 8.4–10.2)
CHLORIDE SERPL-SCNC: 102 MMOL/L (ref 98–107)
CO2 SERPL-SCNC: 29 MMOL/L (ref 21–32)
CREAT SERPL-MCNC: 1.28 MG/DL (ref 0.51–0.95)
DIFFERENTIAL METHOD BLD: ABNORMAL
EOSINOPHIL # BLD AUTO: 0 K/MCL (ref 0.1–0.5)
EOSINOPHIL NFR SPEC: 1 %
ERYTHROCYTE [DISTWIDTH] IN BLOOD: 14.1 % (ref 11–15)
FERRITIN SERPL-MCNC: 108 NG/ML (ref 8–252)
GLUCOSE BLDC GLUCOMTR-MCNC: 92 MG/DL (ref 65–99)
GLUCOSE SERPL-MCNC: 106 MG/DL (ref 65–99)
HCT VFR BLD CALC: 35.4 % (ref 36–46.5)
HGB BLD-MCNC: 11.8 G/DL (ref 12–15.5)
INR PPP: 1
IRON SATN MFR SERPL: 23 % (ref 15–45)
IRON SERPL-MCNC: 69 MCG/DL (ref 50–170)
LYMPHOCYTES # BLD MANUAL: 1.2 K/MCL (ref 1–4)
LYMPHOCYTES NFR BLD MANUAL: 27 %
MCH RBC QN AUTO: 30.5 PG (ref 26–34)
MCHC RBC AUTO-ENTMCNC: 33.3 G/DL (ref 32–36.5)
MCV RBC AUTO: 91.5 FL (ref 78–100)
MONOCYTES # BLD MANUAL: 0.4 K/MCL (ref 0.3–0.9)
MONOCYTES NFR BLD MANUAL: 10 %
NEUTROPHILS # BLD: 2.6 K/MCL (ref 1.8–7.7)
NEUTROPHILS NFR BLD AUTO: 61 %
P AXIS (DEGREES): 37
PLATELET # BLD: 214 K/MCL (ref 140–450)
POTASSIUM SERPL-SCNC: 3.6 MMOL/L (ref 3.4–5.1)
PR-INTERVAL (MSEC): 200
PROTHROMBIN TIME: 10 SEC (ref 9.7–11.8)
QRS-INTERVAL (MSEC): 88
QT-INTERVAL (MSEC): 322
QTC: 439
R AXIS (DEGREES): 31
RBC # BLD: 3.87 MIL/MCL (ref 4–5.2)
REPORT TEXT: NORMAL
SODIUM SERPL-SCNC: 136 MMOL/L (ref 135–145)
T AXIS (DEGREES): 11
TIBC SERPL-MCNC: 305 MCG/DL (ref 250–450)
TROPONIN I SERPL-MCNC: 0.08 NG/ML
TROPONIN I SERPL-MCNC: 2.41 NG/ML
TROPONIN I SERPL-MCNC: 2.48 NG/ML
TROPONIN I SERPL-MCNC: 2.66 NG/ML
VENTRICULAR RATE EKG/MIN (BPM): 112
WBC # BLD: 4.2 K/MCL (ref 4.2–11)

## 2018-05-24 PROCEDURE — 10002807 HB RX 258: Performed by: PHYSICIAN ASSISTANT

## 2018-05-24 PROCEDURE — 36415 COLL VENOUS BLD VENIPUNCTURE: CPT

## 2018-05-24 PROCEDURE — 96372 THER/PROPH/DIAG INJ SC/IM: CPT | Performed by: INTERNAL MEDICINE

## 2018-05-24 PROCEDURE — 10004351 HB COUNTER-CATH LAB CASE: Performed by: INTERNAL MEDICINE

## 2018-05-24 PROCEDURE — 71275 CT ANGIOGRAPHY CHEST: CPT

## 2018-05-24 PROCEDURE — 83550 IRON BINDING TEST: CPT

## 2018-05-24 PROCEDURE — 10002805 HB CONTRAST AGENT: Performed by: EMERGENCY MEDICINE

## 2018-05-24 PROCEDURE — 10002801 HB RX 250 W/O HCPCS: Performed by: INTERNAL MEDICINE

## 2018-05-24 PROCEDURE — 82962 GLUCOSE BLOOD TEST: CPT

## 2018-05-24 PROCEDURE — 10002803 HB RX 637

## 2018-05-24 PROCEDURE — 10004651 HB RX, NO CHARGE ITEM: Performed by: EMERGENCY MEDICINE

## 2018-05-24 PROCEDURE — 85610 PROTHROMBIN TIME: CPT

## 2018-05-24 PROCEDURE — 85025 COMPLETE CBC W/AUTO DIFF WBC: CPT

## 2018-05-24 PROCEDURE — 99152 MOD SED SAME PHYS/QHP 5/>YRS: CPT | Performed by: INTERNAL MEDICINE

## 2018-05-24 PROCEDURE — 10004402 HB ULTRASOUND S&I: Performed by: INTERNAL MEDICINE

## 2018-05-24 PROCEDURE — 10002805 HB CONTRAST AGENT: Performed by: INTERNAL MEDICINE

## 2018-05-24 PROCEDURE — 93010 ELECTROCARDIOGRAM REPORT: CPT | Performed by: INTERNAL MEDICINE

## 2018-05-24 PROCEDURE — 10002807 HB RX 258: Performed by: EMERGENCY MEDICINE

## 2018-05-24 PROCEDURE — 99220 INITIAL OBSERVATION CARE,LEVL III: CPT | Performed by: INTERNAL MEDICINE

## 2018-05-24 PROCEDURE — 82728 ASSAY OF FERRITIN: CPT

## 2018-05-24 PROCEDURE — 10002800 HB RX 250 W HCPCS: Performed by: INTERNAL MEDICINE

## 2018-05-24 PROCEDURE — 10002803 HB RX 637: Performed by: PHYSICIAN ASSISTANT

## 2018-05-24 PROCEDURE — G0378 HOSPITAL OBSERVATION PER HR: HCPCS

## 2018-05-24 PROCEDURE — 71045 X-RAY EXAM CHEST 1 VIEW: CPT | Performed by: RADIOLOGY

## 2018-05-24 PROCEDURE — C1894 INTRO/SHEATH, NON-LASER: HCPCS | Performed by: INTERNAL MEDICINE

## 2018-05-24 PROCEDURE — 94640 AIRWAY INHALATION TREATMENT: CPT

## 2018-05-24 PROCEDURE — 36000 PLACE NEEDLE IN VEIN: CPT

## 2018-05-24 PROCEDURE — 10004370 HB CARDIAC CATH: Performed by: INTERNAL MEDICINE

## 2018-05-24 PROCEDURE — 99245 OFF/OP CONSLTJ NEW/EST HI 55: CPT | Performed by: INTERNAL MEDICINE

## 2018-05-24 PROCEDURE — 93458 L HRT ARTERY/VENTRICLE ANGIO: CPT | Performed by: INTERNAL MEDICINE

## 2018-05-24 PROCEDURE — 71275 CT ANGIOGRAPHY CHEST: CPT | Performed by: RADIOLOGY

## 2018-05-24 PROCEDURE — 10002803 HB RX 637: Performed by: INTERNAL MEDICINE

## 2018-05-24 PROCEDURE — 84484 ASSAY OF TROPONIN QUANT: CPT

## 2018-05-24 PROCEDURE — C1769 GUIDE WIRE: HCPCS | Performed by: INTERNAL MEDICINE

## 2018-05-24 PROCEDURE — 10002803 HB RX 637: Performed by: EMERGENCY MEDICINE

## 2018-05-24 PROCEDURE — 82746 ASSAY OF FOLIC ACID SERUM: CPT

## 2018-05-24 PROCEDURE — 80048 BASIC METABOLIC PNL TOTAL CA: CPT

## 2018-05-24 PROCEDURE — 93005 ELECTROCARDIOGRAM TRACING: CPT | Performed by: EMERGENCY MEDICINE

## 2018-05-24 PROCEDURE — 85730 THROMBOPLASTIN TIME PARTIAL: CPT

## 2018-05-24 PROCEDURE — 71045 X-RAY EXAM CHEST 1 VIEW: CPT

## 2018-05-24 PROCEDURE — 99285 EMERGENCY DEPT VISIT HI MDM: CPT | Performed by: EMERGENCY MEDICINE

## 2018-05-24 PROCEDURE — 99285 EMERGENCY DEPT VISIT HI MDM: CPT

## 2018-05-24 RX ORDER — ENOXAPARIN SODIUM 100 MG/ML
40 INJECTION SUBCUTANEOUS EVERY EVENING
Status: DISCONTINUED | OUTPATIENT
Start: 2018-05-24 | End: 2018-05-25 | Stop reason: HOSPADM

## 2018-05-24 RX ORDER — SODIUM CHLORIDE 9 MG/ML
INJECTION, SOLUTION INTRAVENOUS CONTINUOUS
Status: DISCONTINUED | OUTPATIENT
Start: 2018-05-24 | End: 2018-05-25 | Stop reason: HOSPADM

## 2018-05-24 RX ORDER — AMOXICILLIN 250 MG
2 CAPSULE ORAL DAILY PRN
Status: DISCONTINUED | OUTPATIENT
Start: 2018-05-24 | End: 2018-05-25 | Stop reason: HOSPADM

## 2018-05-24 RX ORDER — HEPARIN SODIUM,PORCINE/PF 10 UNIT/ML
5 SYRINGE (ML) INTRAVENOUS PRN
Status: DISCONTINUED | OUTPATIENT
Start: 2018-05-24 | End: 2018-05-25 | Stop reason: HOSPADM

## 2018-05-24 RX ORDER — BENZONATATE 100 MG/1
100-200 CAPSULE ORAL 3 TIMES DAILY PRN
Status: DISCONTINUED | OUTPATIENT
Start: 2018-05-24 | End: 2018-05-25 | Stop reason: HOSPADM

## 2018-05-24 RX ORDER — POTASSIUM CHLORIDE 20 MEQ/1
40 TABLET, EXTENDED RELEASE ORAL EVERY 4 HOURS PRN
Status: DISCONTINUED | OUTPATIENT
Start: 2018-05-24 | End: 2018-05-25 | Stop reason: HOSPADM

## 2018-05-24 RX ORDER — FERROUS SULFATE 325(65) MG
325 TABLET ORAL 2 TIMES DAILY
Status: DISCONTINUED | OUTPATIENT
Start: 2018-05-24 | End: 2018-05-25 | Stop reason: HOSPADM

## 2018-05-24 RX ORDER — LEVOTHYROXINE SODIUM 0.05 MG/1
50 TABLET ORAL
Status: DISCONTINUED | OUTPATIENT
Start: 2018-05-25 | End: 2018-05-25 | Stop reason: HOSPADM

## 2018-05-24 RX ORDER — ACETAMINOPHEN 325 MG/1
650 TABLET ORAL EVERY 4 HOURS PRN
Status: DISCONTINUED | OUTPATIENT
Start: 2018-05-24 | End: 2018-05-24 | Stop reason: SDUPTHER

## 2018-05-24 RX ORDER — POTASSIUM CITRATE 10 MEQ/1
1080 TABLET, EXTENDED RELEASE ORAL
Status: DISCONTINUED | OUTPATIENT
Start: 2018-05-25 | End: 2018-05-24 | Stop reason: CLARIF

## 2018-05-24 RX ORDER — FUROSEMIDE 40 MG/1
40 TABLET ORAL DAILY
Status: DISCONTINUED | OUTPATIENT
Start: 2018-05-25 | End: 2018-05-25 | Stop reason: HOSPADM

## 2018-05-24 RX ORDER — ACETAMINOPHEN 325 MG/1
650 TABLET ORAL EVERY 4 HOURS PRN
Status: DISCONTINUED | OUTPATIENT
Start: 2018-05-24 | End: 2018-05-25 | Stop reason: HOSPADM

## 2018-05-24 RX ORDER — MIDAZOLAM HYDROCHLORIDE 1 MG/ML
INJECTION, SOLUTION INTRAMUSCULAR; INTRAVENOUS PRN
Status: DISCONTINUED | OUTPATIENT
Start: 2018-05-24 | End: 2018-05-24 | Stop reason: HOSPADM

## 2018-05-24 RX ORDER — ASPIRIN 81 MG/1
81 TABLET, CHEWABLE ORAL DAILY
Status: DISCONTINUED | OUTPATIENT
Start: 2018-05-25 | End: 2018-05-25 | Stop reason: HOSPADM

## 2018-05-24 RX ORDER — NITROGLYCERIN 0.4 MG/1
TABLET SUBLINGUAL PRN
Status: DISCONTINUED | OUTPATIENT
Start: 2018-05-24 | End: 2018-05-24 | Stop reason: HOSPADM

## 2018-05-24 RX ORDER — MULTIVITAMIN,THER AND MINERALS
1 TABLET ORAL DAILY
Status: DISCONTINUED | OUTPATIENT
Start: 2018-05-25 | End: 2018-05-25 | Stop reason: HOSPADM

## 2018-05-24 RX ORDER — NITROGLYCERIN 0.4 MG/1
0.4 TABLET SUBLINGUAL EVERY 5 MIN PRN
Status: DISCONTINUED | OUTPATIENT
Start: 2018-05-24 | End: 2018-05-25 | Stop reason: HOSPADM

## 2018-05-24 RX ORDER — GABAPENTIN 300 MG/1
300 CAPSULE ORAL NIGHTLY
Status: DISCONTINUED | OUTPATIENT
Start: 2018-05-24 | End: 2018-05-25 | Stop reason: HOSPADM

## 2018-05-24 RX ORDER — PANTOPRAZOLE SODIUM 40 MG/1
40 TABLET, DELAYED RELEASE ORAL
Status: DISCONTINUED | OUTPATIENT
Start: 2018-05-25 | End: 2018-05-25 | Stop reason: HOSPADM

## 2018-05-24 RX ORDER — ASPIRIN 81 MG/1
324 TABLET, CHEWABLE ORAL ONCE
Status: COMPLETED | OUTPATIENT
Start: 2018-05-24 | End: 2018-05-24

## 2018-05-24 RX ORDER — CODEINE PHOSPHATE AND GUAIFENESIN 10; 100 MG/5ML; MG/5ML
5 SOLUTION ORAL 3 TIMES DAILY PRN
Status: DISCONTINUED | OUTPATIENT
Start: 2018-05-24 | End: 2018-05-25 | Stop reason: HOSPADM

## 2018-05-24 RX ORDER — CLONAZEPAM 1 MG/1
1 TABLET ORAL NIGHTLY PRN
Status: DISCONTINUED | OUTPATIENT
Start: 2018-05-24 | End: 2018-05-25 | Stop reason: HOSPADM

## 2018-05-24 RX ORDER — ALBUTEROL SULFATE 90 UG/1
2 AEROSOL, METERED RESPIRATORY (INHALATION) EVERY 4 HOURS PRN
Status: DISCONTINUED | OUTPATIENT
Start: 2018-05-24 | End: 2018-05-25 | Stop reason: HOSPADM

## 2018-05-24 RX ORDER — MIDAZOLAM HYDROCHLORIDE 1 MG/ML
1 INJECTION, SOLUTION INTRAMUSCULAR; INTRAVENOUS PRN
Status: ACTIVE | OUTPATIENT
Start: 2018-05-24 | End: 2018-05-25

## 2018-05-24 RX ORDER — FAMOTIDINE 20 MG/1
20 TABLET, FILM COATED ORAL
Status: COMPLETED | OUTPATIENT
Start: 2018-05-24 | End: 2018-05-24

## 2018-05-24 RX ORDER — DIPHENHYDRAMINE HCL 25 MG
25 CAPSULE ORAL EVERY 6 HOURS PRN
Status: DISCONTINUED | OUTPATIENT
Start: 2018-05-24 | End: 2018-05-25 | Stop reason: HOSPADM

## 2018-05-24 RX ORDER — HEPARIN SODIUM 1000 [USP'U]/ML
INJECTION, SOLUTION INTRAVENOUS; SUBCUTANEOUS PRN
Status: DISCONTINUED | OUTPATIENT
Start: 2018-05-24 | End: 2018-05-24 | Stop reason: HOSPADM

## 2018-05-24 RX ORDER — HYDRALAZINE HYDROCHLORIDE 20 MG/ML
10 INJECTION INTRAMUSCULAR; INTRAVENOUS EVERY 4 HOURS PRN
Status: ACTIVE | OUTPATIENT
Start: 2018-05-24 | End: 2018-05-25

## 2018-05-24 RX ORDER — FLUTICASONE PROPIONATE 50 MCG
2 SPRAY, SUSPENSION (ML) NASAL DAILY
Status: DISCONTINUED | OUTPATIENT
Start: 2018-05-25 | End: 2018-05-25 | Stop reason: HOSPADM

## 2018-05-24 RX ORDER — DIPHENHYDRAMINE HCL 25 MG
50 CAPSULE ORAL
Status: COMPLETED | OUTPATIENT
Start: 2018-05-24 | End: 2018-05-24

## 2018-05-24 RX ORDER — MAGNESIUM SULFATE 1 G/100ML
1 INJECTION INTRAVENOUS DAILY PRN
Status: DISCONTINUED | OUTPATIENT
Start: 2018-05-24 | End: 2018-05-25 | Stop reason: HOSPADM

## 2018-05-24 RX ORDER — POLYETHYLENE GLYCOL 3350 17 G/17G
17 POWDER, FOR SOLUTION ORAL 2 TIMES DAILY
Status: DISCONTINUED | OUTPATIENT
Start: 2018-05-24 | End: 2018-05-25 | Stop reason: HOSPADM

## 2018-05-24 RX ORDER — CALCIUM CARBONATE 500 MG/1
500 TABLET, CHEWABLE ORAL 3 TIMES DAILY
Status: DISCONTINUED | OUTPATIENT
Start: 2018-05-24 | End: 2018-05-25 | Stop reason: HOSPADM

## 2018-05-24 RX ORDER — POTASSIUM CHLORIDE 10 MEQ
10 TABLET, EXT RELEASE, PARTICLES/CRYSTALS ORAL
Status: DISCONTINUED | OUTPATIENT
Start: 2018-05-24 | End: 2018-05-25 | Stop reason: HOSPADM

## 2018-05-24 RX ORDER — TIOTROPIUM BROMIDE 18 UG/1
18 CAPSULE ORAL; RESPIRATORY (INHALATION) EVERY EVENING
Status: DISCONTINUED | OUTPATIENT
Start: 2018-05-24 | End: 2018-05-25 | Stop reason: HOSPADM

## 2018-05-24 RX ORDER — 0.9 % SODIUM CHLORIDE 0.9 %
2 VIAL (ML) INJECTION PRN
Status: DISCONTINUED | OUTPATIENT
Start: 2018-05-24 | End: 2018-05-25 | Stop reason: HOSPADM

## 2018-05-24 RX ORDER — BUSPIRONE HYDROCHLORIDE 15 MG/1
30 TABLET ORAL 2 TIMES DAILY
Status: DISCONTINUED | OUTPATIENT
Start: 2018-05-24 | End: 2018-05-25 | Stop reason: HOSPADM

## 2018-05-24 RX ORDER — TRAMADOL HYDROCHLORIDE 50 MG/1
50 TABLET ORAL EVERY 6 HOURS PRN
Status: DISCONTINUED | OUTPATIENT
Start: 2018-05-24 | End: 2018-05-25 | Stop reason: HOSPADM

## 2018-05-24 RX ORDER — PRAZOSIN HYDROCHLORIDE 1 MG/1
2 CAPSULE ORAL NIGHTLY
Status: DISCONTINUED | OUTPATIENT
Start: 2018-05-24 | End: 2018-05-25 | Stop reason: HOSPADM

## 2018-05-24 RX ORDER — NICOTINE POLACRILEX 4 MG
15 LOZENGE BUCCAL PRN
Status: DISCONTINUED | OUTPATIENT
Start: 2018-05-24 | End: 2018-05-25 | Stop reason: HOSPADM

## 2018-05-24 RX ORDER — IPRATROPIUM BROMIDE AND ALBUTEROL SULFATE 2.5; .5 MG/3ML; MG/3ML
3 SOLUTION RESPIRATORY (INHALATION)
Status: DISCONTINUED | OUTPATIENT
Start: 2018-05-24 | End: 2018-05-25 | Stop reason: HOSPADM

## 2018-05-24 RX ORDER — HEPARIN SODIUM,PORCINE/PF 10 UNIT/ML
5 SYRINGE (ML) INTRAVENOUS DAILY
Status: DISCONTINUED | OUTPATIENT
Start: 2018-05-24 | End: 2018-05-25 | Stop reason: HOSPADM

## 2018-05-24 RX ORDER — LIDOCAINE HYDROCHLORIDE 10 MG/ML
1 INJECTION, SOLUTION INFILTRATION; PERINEURAL PRN
Status: ACTIVE | OUTPATIENT
Start: 2018-05-24 | End: 2018-05-25

## 2018-05-24 RX ORDER — DILTIAZEM HYDROCHLORIDE 180 MG/1
360 CAPSULE, EXTENDED RELEASE ORAL DAILY
Status: DISCONTINUED | OUTPATIENT
Start: 2018-05-24 | End: 2018-05-25 | Stop reason: HOSPADM

## 2018-05-24 RX ORDER — DULOXETIN HYDROCHLORIDE 60 MG/1
60 CAPSULE, DELAYED RELEASE ORAL 2 TIMES DAILY
Status: DISCONTINUED | OUTPATIENT
Start: 2018-05-24 | End: 2018-05-25 | Stop reason: HOSPADM

## 2018-05-24 RX ORDER — POTASSIUM CHLORIDE 1.5 G/1.58G
20 POWDER, FOR SOLUTION ORAL EVERY 4 HOURS PRN
Status: DISCONTINUED | OUTPATIENT
Start: 2018-05-24 | End: 2018-05-25 | Stop reason: HOSPADM

## 2018-05-24 RX ORDER — DEXTROSE MONOHYDRATE 50 MG/ML
INJECTION, SOLUTION INTRAVENOUS CONTINUOUS PRN
Status: DISCONTINUED | OUTPATIENT
Start: 2018-05-24 | End: 2018-05-25 | Stop reason: HOSPADM

## 2018-05-24 RX ORDER — ATORVASTATIN CALCIUM 10 MG/1
10 TABLET, FILM COATED ORAL NIGHTLY
Status: DISCONTINUED | OUTPATIENT
Start: 2018-05-24 | End: 2018-05-25 | Stop reason: HOSPADM

## 2018-05-24 RX ORDER — CLONIDINE HYDROCHLORIDE 0.1 MG/1
0.1 TABLET ORAL EVERY 4 HOURS PRN
Status: ACTIVE | OUTPATIENT
Start: 2018-05-24 | End: 2018-05-25

## 2018-05-24 RX ORDER — MAGNESIUM HYDROXIDE/ALUMINUM HYDROXICE/SIMETHICONE 120; 1200; 1200 MG/30ML; MG/30ML; MG/30ML
30 SUSPENSION ORAL EVERY 4 HOURS PRN
Status: DISCONTINUED | OUTPATIENT
Start: 2018-05-24 | End: 2018-05-25 | Stop reason: HOSPADM

## 2018-05-24 RX ORDER — VERAPAMIL HYDROCHLORIDE 2.5 MG/ML
INJECTION, SOLUTION INTRAVENOUS PRN
Status: DISCONTINUED | OUTPATIENT
Start: 2018-05-24 | End: 2018-05-24 | Stop reason: HOSPADM

## 2018-05-24 RX ORDER — 0.9 % SODIUM CHLORIDE 0.9 %
2 VIAL (ML) INJECTION EVERY 12 HOURS SCHEDULED
Status: DISCONTINUED | OUTPATIENT
Start: 2018-05-24 | End: 2018-05-25 | Stop reason: HOSPADM

## 2018-05-24 RX ORDER — GABAPENTIN 100 MG/1
100 CAPSULE ORAL 3 TIMES DAILY
Status: DISCONTINUED | OUTPATIENT
Start: 2018-05-24 | End: 2018-05-25 | Stop reason: HOSPADM

## 2018-05-24 RX ORDER — NITROGLYCERIN 0.4 MG/1
0.4 TABLET SUBLINGUAL EVERY 5 MIN PRN
Status: ACTIVE | OUTPATIENT
Start: 2018-05-24 | End: 2018-05-25

## 2018-05-24 RX ORDER — NITROGLYCERIN 5 MG/ML
INJECTION, SOLUTION INTRAVENOUS PRN
Status: DISCONTINUED | OUTPATIENT
Start: 2018-05-24 | End: 2018-05-24 | Stop reason: HOSPADM

## 2018-05-24 RX ORDER — BUPROPION HYDROCHLORIDE 150 MG/1
300 TABLET ORAL DAILY
Status: DISCONTINUED | OUTPATIENT
Start: 2018-05-25 | End: 2018-05-25 | Stop reason: HOSPADM

## 2018-05-24 RX ORDER — LIDOCAINE HYDROCHLORIDE 10 MG/ML
INJECTION, SOLUTION EPIDURAL; INFILTRATION; INTRACAUDAL; PERINEURAL PRN
Status: DISCONTINUED | OUTPATIENT
Start: 2018-05-24 | End: 2018-05-24 | Stop reason: HOSPADM

## 2018-05-24 RX ORDER — POTASSIUM CHLORIDE 1.5 G/1.58G
40 POWDER, FOR SOLUTION ORAL EVERY 4 HOURS PRN
Status: DISCONTINUED | OUTPATIENT
Start: 2018-05-24 | End: 2018-05-25 | Stop reason: HOSPADM

## 2018-05-24 RX ORDER — MAGNESIUM L-LACTATE 84 MG
84 TABLET, EXTENDED RELEASE ORAL EVERY EVENING
Status: DISCONTINUED | OUTPATIENT
Start: 2018-05-24 | End: 2018-05-25 | Stop reason: HOSPADM

## 2018-05-24 RX ORDER — FLUTICASONE PROPIONATE AND SALMETEROL 500; 50 UG/1; UG/1
1 POWDER RESPIRATORY (INHALATION)
Status: DISCONTINUED | OUTPATIENT
Start: 2018-05-24 | End: 2018-05-25 | Stop reason: HOSPADM

## 2018-05-24 RX ORDER — NITROGLYCERIN 0.4 MG/1
0.4 TABLET SUBLINGUAL ONCE
Status: COMPLETED | OUTPATIENT
Start: 2018-05-24 | End: 2018-05-24

## 2018-05-24 RX ORDER — BISACODYL 10 MG
10 SUPPOSITORY, RECTAL RECTAL DAILY PRN
Status: DISCONTINUED | OUTPATIENT
Start: 2018-05-24 | End: 2018-05-25 | Stop reason: HOSPADM

## 2018-05-24 RX ORDER — POTASSIUM CHLORIDE 20 MEQ/1
20 TABLET, EXTENDED RELEASE ORAL EVERY 4 HOURS PRN
Status: DISCONTINUED | OUTPATIENT
Start: 2018-05-24 | End: 2018-05-25 | Stop reason: HOSPADM

## 2018-05-24 RX ORDER — HYDROXYZINE HYDROCHLORIDE 25 MG/1
100 TABLET, FILM COATED ORAL NIGHTLY
Status: DISCONTINUED | OUTPATIENT
Start: 2018-05-24 | End: 2018-05-25 | Stop reason: HOSPADM

## 2018-05-24 RX ORDER — DEXTROSE MONOHYDRATE 25 G/50ML
25 INJECTION, SOLUTION INTRAVENOUS PRN
Status: DISCONTINUED | OUTPATIENT
Start: 2018-05-24 | End: 2018-05-25 | Stop reason: HOSPADM

## 2018-05-24 RX ORDER — TIZANIDINE 2 MG/1
2 TABLET ORAL EVERY 8 HOURS PRN
Status: DISCONTINUED | OUTPATIENT
Start: 2018-05-24 | End: 2018-05-25 | Stop reason: HOSPADM

## 2018-05-24 RX ADMIN — GABAPENTIN 100 MG: 100 CAPSULE ORAL at 21:46

## 2018-05-24 RX ADMIN — POLYETHYLENE GLYCOL (3350) 17 G: 17 POWDER, FOR SOLUTION ORAL at 18:44

## 2018-05-24 RX ADMIN — ACETAMINOPHEN AND CODEINE PHOSPHATE 2 TABLET: 300; 30 TABLET ORAL at 17:08

## 2018-05-24 RX ADMIN — IOPAMIDOL 120 ML: 755 INJECTION, SOLUTION INTRAVENOUS at 16:27

## 2018-05-24 RX ADMIN — HEPARIN SODIUM 5000 UNITS: 1000 INJECTION, SOLUTION INTRAVENOUS; SUBCUTANEOUS at 15:50

## 2018-05-24 RX ADMIN — TIZANIDINE 2 MG: 2 TABLET ORAL at 17:08

## 2018-05-24 RX ADMIN — ATORVASTATIN CALCIUM 10 MG: 10 TABLET, FILM COATED ORAL at 21:41

## 2018-05-24 RX ADMIN — PRAZOSIN HYDROCHLORIDE 2 MG: 1 CAPSULE ORAL at 21:38

## 2018-05-24 RX ADMIN — GABAPENTIN 300 MG: 300 CAPSULE ORAL at 21:39

## 2018-05-24 RX ADMIN — BUSPIRONE HYDROCHLORIDE 30 MG: 15 TABLET ORAL at 21:41

## 2018-05-24 RX ADMIN — GUAIFENESIN AND DEXTROMETHORPHAN HYDROBROMIDE 2 TABLET: 600; 30 TABLET, EXTENDED RELEASE ORAL at 21:40

## 2018-05-24 RX ADMIN — TIOTROPIUM BROMIDE 18 MCG: 18 CAPSULE ORAL; RESPIRATORY (INHALATION) at 19:57

## 2018-05-24 RX ADMIN — SODIUM CHLORIDE 100 ML: 9 INJECTION, SOLUTION INTRAVENOUS at 10:33

## 2018-05-24 RX ADMIN — FLUTICASONE PROPIONATE AND SALMETEROL 1 PUFF: 50; 500 POWDER RESPIRATORY (INHALATION) at 19:56

## 2018-05-24 RX ADMIN — IOPAMIDOL 100 ML: 755 INJECTION, SOLUTION INTRAVENOUS at 10:33

## 2018-05-24 RX ADMIN — MIDAZOLAM HYDROCHLORIDE 1 MG: 1 INJECTION, SOLUTION INTRAMUSCULAR; INTRAVENOUS at 16:15

## 2018-05-24 RX ADMIN — CLONAZEPAM 1 MG: 1 TABLET ORAL at 21:41

## 2018-05-24 RX ADMIN — VERAPAMIL HYDROCHLORIDE 5 MG: 2.5 INJECTION, SOLUTION INTRAVENOUS at 15:50

## 2018-05-24 RX ADMIN — TRAMADOL HYDROCHLORIDE 50 MG: 50 TABLET, FILM COATED ORAL at 19:25

## 2018-05-24 RX ADMIN — DIPHENHYDRAMINE HYDROCHLORIDE 50 MG: 25 CAPSULE ORAL at 15:13

## 2018-05-24 RX ADMIN — LIDOCAINE HYDROCHLORIDE 1.5 ML: 10 INJECTION, SOLUTION EPIDURAL; INFILTRATION; INTRACAUDAL; PERINEURAL at 15:46

## 2018-05-24 RX ADMIN — MIDAZOLAM HYDROCHLORIDE 2 MG: 1 INJECTION, SOLUTION INTRAMUSCULAR; INTRAVENOUS at 15:46

## 2018-05-24 RX ADMIN — ASPIRIN 81 MG 324 MG: 81 TABLET ORAL at 09:52

## 2018-05-24 RX ADMIN — FERROUS SULFATE TAB 325 MG (65 MG ELEMENTAL FE) 325 MG: 325 (65 FE) TAB at 18:40

## 2018-05-24 RX ADMIN — DULOXETINE 60 MG: 60 CAPSULE, DELAYED RELEASE ORAL at 18:40

## 2018-05-24 RX ADMIN — NITROGLYCERIN 0.4 MG: 0.4 TABLET SUBLINGUAL at 09:53

## 2018-05-24 RX ADMIN — NITROGLYCERIN 0.4 MG: 0.4 TABLET SUBLINGUAL at 16:23

## 2018-05-24 RX ADMIN — DILTIAZEM HYDROCHLORIDE 360 MG: 180 CAPSULE, COATED, EXTENDED RELEASE ORAL at 21:40

## 2018-05-24 RX ADMIN — HEPARIN SODIUM 1000 ML: 200 INJECTION, SOLUTION INTRAVENOUS at 15:43

## 2018-05-24 RX ADMIN — ENOXAPARIN SODIUM 40 MG: 100 INJECTION SUBCUTANEOUS at 18:48

## 2018-05-24 RX ADMIN — FAMOTIDINE 20 MG: 20 TABLET ORAL at 15:12

## 2018-05-24 RX ADMIN — HYDROXYZINE HYDROCHLORIDE 100 MG: 25 TABLET, FILM COATED ORAL at 21:39

## 2018-05-24 RX ADMIN — SODIUM CHLORIDE: 9 INJECTION, SOLUTION INTRAVENOUS at 15:13

## 2018-05-24 RX ADMIN — NITROGLYCERIN 100 MCG: 5 INJECTION, SOLUTION INTRAVENOUS at 15:50

## 2018-05-24 RX ADMIN — Medication 84 MG: at 18:40

## 2018-05-24 RX ADMIN — CALCIUM CARBONATE (ANTACID) CHEW TAB 500 MG 500 MG: 500 CHEW TAB at 18:40

## 2018-05-24 RX ADMIN — VITAMIN D, TAB 1000IU (100/BT) 1000 UNITS: 25 TAB at 18:40

## 2018-05-24 ASSESSMENT — PAIN SCALES - GENERAL
PAIN_LEVEL_AT_REST: 6
PAIN_LEVEL_AT_REST: 3
PAIN_LEVEL_AT_REST: 5
PAIN_LEVEL_AT_REST: 2
PAIN_LEVEL_AT_REST: 3
PAIN_LEVEL_AT_REST: 3
PAIN_LEVEL_AT_REST: 2
PAIN_LEVEL_AT_REST: 3
PAIN_LEVEL_AT_REST: 4
PAIN_LEVEL_AT_REST: 2
PAIN_LEVEL_AT_REST: 3
PAIN_LEVEL_AT_REST: 2
PAIN_LEVEL_AT_REST: 2

## 2018-05-24 ASSESSMENT — ACTIVITIES OF DAILY LIVING (ADL)
ADL_SCORE: 10
BATHING: INDEPENDENT
ADL_SCORE: 12
CHRONIC_PAIN_PRESENT: NO
RECENT_DECLINE_ADL: NO
ADL_SHORT_OF_BREATH: NO
ADL_BEFORE_ADMISSION: INDEPENDENT
FEEDING: INDEPENDENT
TOILETING: INDEPENDENT
DRESSING: INDEPENDENT

## 2018-05-24 ASSESSMENT — ENCOUNTER SYMPTOMS
CONFUSION: 0
FEVER: 0
NERVOUS/ANXIOUS: 0
NUMBNESS: 0
BACK PAIN: 0
DIARRHEA: 0
WEAKNESS: 0
SORE THROAT: 0
LIGHT-HEADEDNESS: 0
RHINORRHEA: 0
DIZZINESS: 0
ABDOMINAL PAIN: 0
HEADACHES: 0
COLOR CHANGE: 0
ACTIVITY CHANGE: 0
SINUS PRESSURE: 0
CHEST TIGHTNESS: 1
VOMITING: 0
CHILLS: 0
APPETITE CHANGE: 0
ADENOPATHY: 0
NAUSEA: 0
SHORTNESS OF BREATH: 1
BRUISES/BLEEDS EASILY: 0
WOUND: 0
PHOTOPHOBIA: 0
FATIGUE: 0

## 2018-05-24 ASSESSMENT — LIFESTYLE VARIABLES
AUDIT-C TOTAL SCORE: 1
HOW OFTEN DO YOU HAVE 6 OR MORE DRINKS ON ONE OCCASION: NEVER
E-CIGARETTE_USE: NO E-CIGARETTES USE IN THE LAST 30 DAYS
HOW MANY STANDARD DRINKS CONTAINING ALCOHOL DO YOU HAVE ON A TYPICAL DAY: 0,1 OR 2
HOW OFTEN DO YOU HAVE A DRINK CONTAINING ALCOHOL: MONTHLY OR LESS
ALCOHOL_USE_STATUS: NO OR LOW RISK WITH VALIDATED TOOL

## 2018-05-24 ASSESSMENT — NEW YORK HEART ASSOCIATION (NYHA) CLASSIFICATION: NYHA FUNCTIONAL CLASS: NOT CLASS IV

## 2018-05-25 ENCOUNTER — APPOINTMENT (OUTPATIENT)
Dept: CARDIOLOGY | Age: 63
End: 2018-05-25
Attending: NURSE PRACTITIONER

## 2018-05-25 VITALS
WEIGHT: 271.17 LBS | RESPIRATION RATE: 18 BRPM | BODY MASS INDEX: 43.58 KG/M2 | DIASTOLIC BLOOD PRESSURE: 79 MMHG | TEMPERATURE: 98 F | HEART RATE: 96 BPM | HEIGHT: 66 IN | OXYGEN SATURATION: 94 % | SYSTOLIC BLOOD PRESSURE: 130 MMHG

## 2018-05-25 LAB
ALBUMIN SERPL-MCNC: 3.2 G/DL (ref 3.6–5.1)
ALBUMIN/GLOB SERPL: 0.9 {RATIO} (ref 1–2.4)
ALP SERPL-CCNC: 63 UNITS/L (ref 45–117)
ALT SERPL-CCNC: 30 UNITS/L
ANION GAP SERPL CALC-SCNC: 12 MMOL/L (ref 10–20)
AST SERPL-CCNC: 33 UNITS/L
BACTERIA SPEC RESP CULT: NORMAL
BASOPHILS # BLD AUTO: 0 K/MCL (ref 0–0.3)
BASOPHILS NFR BLD AUTO: 1 %
BILIRUB SERPL-MCNC: 0.4 MG/DL (ref 0.2–1)
BUN SERPL-MCNC: 12 MG/DL (ref 6–20)
BUN/CREAT SERPL: 9 (ref 7–25)
CALCIUM SERPL-MCNC: 8.5 MG/DL (ref 8.4–10.2)
CHLORIDE SERPL-SCNC: 101 MMOL/L (ref 98–107)
CO2 SERPL-SCNC: 28 MMOL/L (ref 21–32)
CREAT SERPL-MCNC: 1.35 MG/DL (ref 0.51–0.95)
DIFFERENTIAL METHOD BLD: ABNORMAL
EOSINOPHIL # BLD AUTO: 0.1 K/MCL (ref 0.1–0.5)
EOSINOPHIL NFR SPEC: 1 %
ERYTHROCYTE [DISTWIDTH] IN BLOOD: 14.2 % (ref 11–15)
FOLATE SERPL-MCNC: >24 NG/ML
GLOBULIN SER-MCNC: 3.4 G/DL (ref 2–4)
GLUCOSE BLDC GLUCOMTR-MCNC: 109 MG/DL (ref 65–99)
GLUCOSE BLDC GLUCOMTR-MCNC: 121 MG/DL (ref 65–99)
GLUCOSE SERPL-MCNC: 118 MG/DL (ref 65–99)
GRAM STN SPEC: NORMAL
HCT VFR BLD CALC: 36.1 % (ref 36–46.5)
HGB BLD-MCNC: 12 G/DL (ref 12–15.5)
LYMPHOCYTES # BLD MANUAL: 1.3 K/MCL (ref 1–4)
LYMPHOCYTES NFR BLD MANUAL: 31 %
MAGNESIUM SERPL-MCNC: 2.1 MG/DL (ref 1.7–2.4)
MCH RBC QN AUTO: 30.3 PG (ref 26–34)
MCHC RBC AUTO-ENTMCNC: 33.2 G/DL (ref 32–36.5)
MCV RBC AUTO: 91.2 FL (ref 78–100)
MONOCYTES # BLD MANUAL: 0.4 K/MCL (ref 0.3–0.9)
MONOCYTES NFR BLD MANUAL: 9 %
NEUTROPHILS # BLD: 2.5 K/MCL (ref 1.8–7.7)
NEUTROPHILS NFR BLD AUTO: 58 %
PLATELET # BLD: 228 K/MCL (ref 140–450)
POTASSIUM SERPL-SCNC: 4.2 MMOL/L (ref 3.4–5.1)
PROT SERPL-MCNC: 6.6 G/DL (ref 6.4–8.2)
RBC # BLD: 3.96 MIL/MCL (ref 4–5.2)
REPORT STATUS (RPT): NORMAL
SODIUM SERPL-SCNC: 137 MMOL/L (ref 135–145)
SPECIMEN SOURCE: NORMAL
TROPONIN I SERPL-MCNC: 2.02 NG/ML
TSH SERPL-ACNC: 2.64 MCUNITS/ML (ref 0.35–5)
VIT B12 SERPL-MCNC: 674 PG/ML (ref 211–911)
WBC # BLD: 4.3 K/MCL (ref 4.2–11)

## 2018-05-25 PROCEDURE — 99217 OBSERVATION CARE DISCHARGE: CPT | Performed by: INTERNAL MEDICINE

## 2018-05-25 PROCEDURE — 84484 ASSAY OF TROPONIN QUANT: CPT

## 2018-05-25 PROCEDURE — 36415 COLL VENOUS BLD VENIPUNCTURE: CPT

## 2018-05-25 PROCEDURE — 10004325 HB COUNTER ASSESSMENT EA 15 MIN

## 2018-05-25 PROCEDURE — G0378 HOSPITAL OBSERVATION PER HR: HCPCS

## 2018-05-25 PROCEDURE — 93308 TTE F-UP OR LMTD: CPT | Performed by: INTERNAL MEDICINE

## 2018-05-25 PROCEDURE — 85025 COMPLETE CBC W/AUTO DIFF WBC: CPT

## 2018-05-25 PROCEDURE — 10002803 HB RX 637: Performed by: INTERNAL MEDICINE

## 2018-05-25 PROCEDURE — 76376 3D RENDER W/INTRP POSTPROCES: CPT | Performed by: INTERNAL MEDICINE

## 2018-05-25 PROCEDURE — 10004651 HB RX, NO CHARGE ITEM: Performed by: NURSE PRACTITIONER

## 2018-05-25 PROCEDURE — 10002800 HB RX 250 W HCPCS: Performed by: INTERNAL MEDICINE

## 2018-05-25 PROCEDURE — 10004651 HB RX, NO CHARGE ITEM: Performed by: INTERNAL MEDICINE

## 2018-05-25 PROCEDURE — 80053 COMPREHEN METABOLIC PANEL: CPT

## 2018-05-25 PROCEDURE — 94640 AIRWAY INHALATION TREATMENT: CPT

## 2018-05-25 PROCEDURE — 82962 GLUCOSE BLOOD TEST: CPT

## 2018-05-25 PROCEDURE — 93308 TTE F-UP OR LMTD: CPT

## 2018-05-25 PROCEDURE — 84443 ASSAY THYROID STIM HORMONE: CPT

## 2018-05-25 PROCEDURE — 0399T ECHO LIMITED: CPT | Performed by: INTERNAL MEDICINE

## 2018-05-25 PROCEDURE — 83735 ASSAY OF MAGNESIUM: CPT

## 2018-05-25 RX ORDER — DILTIAZEM HYDROCHLORIDE 360 MG/1
360 CAPSULE, EXTENDED RELEASE ORAL DAILY
Qty: 30 CAPSULE | Refills: 0 | Status: SHIPPED | OUTPATIENT
Start: 2018-05-25 | End: 2018-05-29 | Stop reason: ALTCHOICE

## 2018-05-25 RX ORDER — NITROGLYCERIN 0.4 MG/1
0.4 TABLET SUBLINGUAL EVERY 5 MIN PRN
Qty: 25 TABLET | Refills: 0 | Status: SHIPPED | OUTPATIENT
Start: 2018-05-25 | End: 2019-06-14 | Stop reason: SDUPTHER

## 2018-05-25 RX ORDER — ASPIRIN 81 MG/1
81 TABLET, CHEWABLE ORAL DAILY
Qty: 30 TABLET | Refills: 0 | Status: SHIPPED | OUTPATIENT
Start: 2018-05-25 | End: 2018-06-08 | Stop reason: SINTOL

## 2018-05-25 RX ADMIN — BUPROPION HYDROCHLORIDE 300 MG: 150 TABLET, FILM COATED, EXTENDED RELEASE ORAL at 08:04

## 2018-05-25 RX ADMIN — SODIUM CHLORIDE, PRESERVATIVE FREE 50 UNITS: 5 INJECTION INTRAVENOUS at 08:07

## 2018-05-25 RX ADMIN — FLUTICASONE PROPIONATE 2 SPRAY: 50 SPRAY, METERED NASAL at 08:02

## 2018-05-25 RX ADMIN — HEPARIN SODIUM (PORCINE) LOCK FLUSH IV SOLN 100 UNIT/ML 500 UNITS: 100 SOLUTION at 15:02

## 2018-05-25 RX ADMIN — ASPIRIN 81 MG 81 MG: 81 TABLET ORAL at 08:04

## 2018-05-25 RX ADMIN — FERROUS SULFATE TAB 325 MG (65 MG ELEMENTAL FE) 325 MG: 325 (65 FE) TAB at 08:04

## 2018-05-25 RX ADMIN — DILTIAZEM HYDROCHLORIDE 360 MG: 180 CAPSULE, COATED, EXTENDED RELEASE ORAL at 08:03

## 2018-05-25 RX ADMIN — GUAIFENESIN AND DEXTROMETHORPHAN HYDROBROMIDE 1 TABLET: 600; 30 TABLET, EXTENDED RELEASE ORAL at 08:04

## 2018-05-25 RX ADMIN — PANTOPRAZOLE SODIUM 40 MG: 40 TABLET, DELAYED RELEASE ORAL at 05:19

## 2018-05-25 RX ADMIN — SODIUM CHLORIDE, PRESERVATIVE FREE 2 ML: 5 INJECTION INTRAVENOUS at 08:07

## 2018-05-25 RX ADMIN — DULOXETINE 60 MG: 60 CAPSULE, DELAYED RELEASE ORAL at 08:04

## 2018-05-25 RX ADMIN — CALCIUM CARBONATE (ANTACID) CHEW TAB 500 MG 500 MG: 500 CHEW TAB at 13:11

## 2018-05-25 RX ADMIN — FLUTICASONE PROPIONATE AND SALMETEROL 1 PUFF: 50; 500 POWDER RESPIRATORY (INHALATION) at 08:19

## 2018-05-25 RX ADMIN — POTASSIUM CHLORIDE 10 MEQ: 750 TABLET, FILM COATED, EXTENDED RELEASE ORAL at 08:04

## 2018-05-25 RX ADMIN — GABAPENTIN 100 MG: 100 CAPSULE ORAL at 14:55

## 2018-05-25 RX ADMIN — BUSPIRONE HYDROCHLORIDE 30 MG: 15 TABLET ORAL at 08:04

## 2018-05-25 RX ADMIN — CALCIUM CARBONATE (ANTACID) CHEW TAB 500 MG 500 MG: 500 CHEW TAB at 08:04

## 2018-05-25 RX ADMIN — POLYETHYLENE GLYCOL (3350) 17 G: 17 POWDER, FOR SOLUTION ORAL at 08:03

## 2018-05-25 RX ADMIN — Medication 1 TABLET: at 08:03

## 2018-05-25 RX ADMIN — GABAPENTIN 100 MG: 100 CAPSULE ORAL at 08:03

## 2018-05-25 RX ADMIN — LEVOTHYROXINE SODIUM 50 MCG: 50 TABLET ORAL at 05:19

## 2018-05-25 RX ADMIN — FUROSEMIDE 40 MG: 40 TABLET ORAL at 08:04

## 2018-05-28 RX ORDER — PRAZOSIN HYDROCHLORIDE 2 MG/1
2 CAPSULE ORAL 3 TIMES DAILY
Qty: 270 CAPSULE | Refills: 3 | Status: SHIPPED | OUTPATIENT
Start: 2018-05-28 | End: 2018-07-19 | Stop reason: SDUPTHER

## 2018-05-29 ENCOUNTER — TELEPHONE (OUTPATIENT)
Dept: INTERNAL MEDICINE | Age: 63
End: 2018-05-29

## 2018-05-29 ENCOUNTER — OFFICE VISIT (OUTPATIENT)
Dept: INTERNAL MEDICINE | Age: 63
End: 2018-05-29

## 2018-05-29 VITALS
DIASTOLIC BLOOD PRESSURE: 68 MMHG | BODY MASS INDEX: 44.89 KG/M2 | SYSTOLIC BLOOD PRESSURE: 110 MMHG | HEART RATE: 84 BPM | WEIGHT: 276 LBS

## 2018-05-29 DIAGNOSIS — R60.0 EDEMA OF BOTH LEGS: Primary | ICD-10-CM

## 2018-05-29 DIAGNOSIS — L03.115 CELLULITIS OF RIGHT LOWER LEG: ICD-10-CM

## 2018-05-29 DIAGNOSIS — I20.1 PRINZMETAL ANGINA (CMD): ICD-10-CM

## 2018-05-29 LAB
AORTIC VALVE AREA: 1.7 CM2
AV MEAN GRADIENT (AVMG): 17.6 MMHG
AV PEAK GRADIENT (AVPG): 37.9 MMHG
AV PEAK VELOCITY (AVPV): 3.1 M/S
DOP CALC LVOT PEAK VEL (LVOTPV): 1.3 M/S
INTERVENTRICULAR SEPTUM IN END DIASTOLE (IVSD): 1.3 CM
LEFT INTERNAL DIMENSION IN SYSTOLE (LVSD): 1.8 CM
LEFT VENTRICULAR INTERNAL DIMENSION IN DIASTOLE (LVDD): 3.7 CM
LEFT VENTRICULAR POSTERIOR WALL IN END DIASTOLE (LVPW): 1.4 CM
LV EF: 70 %
LV END SYSTOLIC LONGITUDINAL STRAIN GLOBAL (LVGS): -21.4 %
LVOT 2D (LVOTD): 2 CM
LVOT VTI (LVOTVTI): 31.7 CM
MV E TISSUE VEL LAT (MELV): 10 CM/S
MV E TISSUE VEL MED (MESV): 7.3 CM/S
MV E WAVE VEL/E TISSUE VEL MED(MSR): 18.9
MV PEAK A VELOCITY (MVPAV): 1.3 M/S
MV PEAK E VELOCITY (MVPEV): 1.4 M/S
RV TISSUE DOPPLER FREE WALL HEART RATE (RVSTV): 0.2 M/S
TV ESTIMATED RIGHT ARTERIAL PRESSURE (RAP): 3 MMHG

## 2018-05-29 PROCEDURE — 99214 OFFICE O/P EST MOD 30 MIN: CPT | Performed by: INTERNAL MEDICINE

## 2018-05-29 RX ORDER — FUROSEMIDE 40 MG/1
40 TABLET ORAL 2 TIMES DAILY
Qty: 30 TABLET | Refills: 11 | Status: SHIPPED | COMMUNITY
Start: 2018-05-29 | End: 2018-07-10 | Stop reason: SDUPTHER

## 2018-05-29 RX ORDER — DILTIAZEM HYDROCHLORIDE 180 MG/1
180 CAPSULE, EXTENDED RELEASE ORAL DAILY
Qty: 90 CAPSULE | Refills: 3 | Status: ON HOLD | OUTPATIENT
Start: 2018-05-29 | End: 2018-07-17 | Stop reason: HOSPADM

## 2018-05-29 RX ORDER — LEVOFLOXACIN 500 MG/1
500 TABLET, FILM COATED ORAL DAILY
Qty: 10 TABLET | Refills: 0 | Status: SHIPPED | OUTPATIENT
Start: 2018-05-29 | End: 2018-06-26 | Stop reason: SDUPTHER

## 2018-05-29 RX ORDER — ONDANSETRON 4 MG/1
4 TABLET, FILM COATED ORAL EVERY 8 HOURS PRN
Qty: 20 TABLET | Refills: 0 | Status: SHIPPED | OUTPATIENT
Start: 2018-05-29 | End: 2018-08-31 | Stop reason: SDUPTHER

## 2018-05-29 RX ORDER — TRAMADOL HYDROCHLORIDE 50 MG/1
50 TABLET ORAL EVERY 6 HOURS PRN
Qty: 50 TABLET | Refills: 3 | Status: SHIPPED | OUTPATIENT
Start: 2018-05-29 | End: 2018-12-18 | Stop reason: SDUPTHER

## 2018-05-30 ENCOUNTER — TELEPHONE (OUTPATIENT)
Dept: INTERNAL MEDICINE | Age: 63
End: 2018-05-30

## 2018-05-30 ENCOUNTER — TELEPHONE (OUTPATIENT)
Dept: PULMONOLOGY | Age: 63
End: 2018-05-30

## 2018-05-31 ENCOUNTER — TELEPHONE (OUTPATIENT)
Dept: INTERNAL MEDICINE | Age: 63
End: 2018-05-31

## 2018-05-31 ENCOUNTER — HOSPITAL ENCOUNTER (EMERGENCY)
Age: 63
Discharge: HOME | End: 2018-05-31
Attending: EMERGENCY MEDICINE

## 2018-05-31 ENCOUNTER — APPOINTMENT (OUTPATIENT)
Dept: GENERAL RADIOLOGY | Age: 63
End: 2018-05-31
Attending: EMERGENCY MEDICINE

## 2018-05-31 VITALS
DIASTOLIC BLOOD PRESSURE: 91 MMHG | WEIGHT: 277.12 LBS | HEIGHT: 65 IN | SYSTOLIC BLOOD PRESSURE: 150 MMHG | RESPIRATION RATE: 43 BRPM | BODY MASS INDEX: 46.17 KG/M2 | OXYGEN SATURATION: 94 % | TEMPERATURE: 97.7 F | HEART RATE: 80 BPM

## 2018-05-31 DIAGNOSIS — I20.1 PRINZMETAL ANGINA (CMD): Primary | ICD-10-CM

## 2018-05-31 LAB
ANION GAP SERPL CALC-SCNC: 9 MMOL/L (ref 10–20)
APPEARANCE UR: CLEAR
ATRIAL RATE (BPM): 84
BASOPHILS # BLD AUTO: 0 K/MCL (ref 0–0.3)
BASOPHILS NFR BLD AUTO: 0 %
BILIRUB UR QL STRIP: NEGATIVE
BNP SERPL-MCNC: 178 PG/ML
BUN SERPL-MCNC: 16 MG/DL (ref 6–20)
BUN/CREAT SERPL: 12 (ref 7–25)
CALCIUM SERPL-MCNC: 8.2 MG/DL (ref 8.4–10.2)
CHLORIDE SERPL-SCNC: 96 MMOL/L (ref 98–107)
CO2 SERPL-SCNC: 30 MMOL/L (ref 21–32)
COLOR UR: YELLOW
CREAT SERPL-MCNC: 1.34 MG/DL (ref 0.51–0.95)
DIFFERENTIAL METHOD BLD: ABNORMAL
EOSINOPHIL # BLD AUTO: 0 K/MCL (ref 0.1–0.5)
EOSINOPHIL NFR SPEC: 0 %
ERYTHROCYTE [DISTWIDTH] IN BLOOD: 14 % (ref 11–15)
GLUCOSE SERPL-MCNC: 222 MG/DL (ref 65–99)
GLUCOSE UR STRIP-MCNC: ABNORMAL MG/DL
HCT VFR BLD CALC: 31.3 % (ref 36–46.5)
HGB BLD-MCNC: 10.7 G/DL (ref 12–15.5)
HGB UR QL STRIP: NEGATIVE
KETONES UR STRIP-MCNC: NEGATIVE MG/DL
LEUKOCYTE ESTERASE UR QL STRIP: NEGATIVE
LYMPHOCYTES # BLD MANUAL: 1.1 K/MCL (ref 1–4)
LYMPHOCYTES NFR BLD MANUAL: 23 %
MCH RBC QN AUTO: 30.5 PG (ref 26–34)
MCHC RBC AUTO-ENTMCNC: 34.2 G/DL (ref 32–36.5)
MCV RBC AUTO: 89.2 FL (ref 78–100)
MONOCYTES # BLD MANUAL: 0.4 K/MCL (ref 0.3–0.9)
MONOCYTES NFR BLD MANUAL: 9 %
NEUTROPHILS # BLD: 3.3 K/MCL (ref 1.8–7.7)
NEUTROPHILS NFR BLD AUTO: 68 %
NITRITE UR QL STRIP: NEGATIVE
P AXIS (DEGREES): 54
PH UR STRIP: 5 UNITS (ref 5–7)
PLATELET # BLD: 207 K/MCL (ref 140–450)
POTASSIUM SERPL-SCNC: 3.1 MMOL/L (ref 3.4–5.1)
PR-INTERVAL (MSEC): 204
PROT UR STRIP-MCNC: NEGATIVE MG/DL
QRS-INTERVAL (MSEC): 82
QT-INTERVAL (MSEC): 362
QTC: 427
R AXIS (DEGREES): 17
RBC # BLD: 3.51 MIL/MCL (ref 4–5.2)
REPORT TEXT: NORMAL
SODIUM SERPL-SCNC: 132 MMOL/L (ref 135–145)
SP GR UR STRIP: 1 (ref 1–1.03)
SPECIMEN SOURCE: ABNORMAL
T AXIS (DEGREES): 57
TROPONIN I SERPL-MCNC: 0.09 NG/ML
TROPONIN I SERPL-MCNC: 0.15 NG/ML
UROBILINOGEN UR STRIP-MCNC: 0.2 MG/DL (ref 0–1)
VENTRICULAR RATE EKG/MIN (BPM): 84
WBC # BLD: 4.9 K/MCL (ref 4.2–11)

## 2018-05-31 PROCEDURE — 10002800 HB RX 250 W HCPCS: Performed by: EMERGENCY MEDICINE

## 2018-05-31 PROCEDURE — 99285 EMERGENCY DEPT VISIT HI MDM: CPT | Performed by: EMERGENCY MEDICINE

## 2018-05-31 PROCEDURE — 10004651 HB RX, NO CHARGE ITEM: Performed by: EMERGENCY MEDICINE

## 2018-05-31 PROCEDURE — 93010 ELECTROCARDIOGRAM REPORT: CPT | Performed by: INTERNAL MEDICINE

## 2018-05-31 PROCEDURE — 83880 ASSAY OF NATRIURETIC PEPTIDE: CPT

## 2018-05-31 PROCEDURE — 85025 COMPLETE CBC W/AUTO DIFF WBC: CPT

## 2018-05-31 PROCEDURE — 71045 X-RAY EXAM CHEST 1 VIEW: CPT | Performed by: RADIOLOGY

## 2018-05-31 PROCEDURE — 36415 COLL VENOUS BLD VENIPUNCTURE: CPT

## 2018-05-31 PROCEDURE — 96374 THER/PROPH/DIAG INJ IV PUSH: CPT

## 2018-05-31 PROCEDURE — 93005 ELECTROCARDIOGRAM TRACING: CPT | Performed by: EMERGENCY MEDICINE

## 2018-05-31 PROCEDURE — 84484 ASSAY OF TROPONIN QUANT: CPT

## 2018-05-31 PROCEDURE — 10002807 HB RX 258: Performed by: EMERGENCY MEDICINE

## 2018-05-31 PROCEDURE — 99285 EMERGENCY DEPT VISIT HI MDM: CPT

## 2018-05-31 PROCEDURE — 96376 TX/PRO/DX INJ SAME DRUG ADON: CPT

## 2018-05-31 PROCEDURE — 81003 URINALYSIS AUTO W/O SCOPE: CPT

## 2018-05-31 PROCEDURE — 80048 BASIC METABOLIC PNL TOTAL CA: CPT

## 2018-05-31 PROCEDURE — 71045 X-RAY EXAM CHEST 1 VIEW: CPT

## 2018-05-31 RX ORDER — ASPIRIN 81 MG/1
324 TABLET, CHEWABLE ORAL ONCE
Status: COMPLETED | OUTPATIENT
Start: 2018-05-31 | End: 2018-05-31

## 2018-05-31 RX ORDER — ISOSORBIDE DINITRATE 10 MG/1
10 TABLET ORAL 3 TIMES DAILY
Qty: 90 TABLET | Refills: 0 | Status: SHIPPED | OUTPATIENT
Start: 2018-05-31 | End: 2018-06-25 | Stop reason: SDUPTHER

## 2018-05-31 RX ADMIN — SODIUM CHLORIDE 10 ML: 9 INJECTION INTRAMUSCULAR; INTRAVENOUS; SUBCUTANEOUS at 05:35

## 2018-05-31 RX ADMIN — MORPHINE SULFATE 4 MG: 25 INJECTION, SOLUTION, CONCENTRATE INTRAVENOUS at 05:52

## 2018-05-31 RX ADMIN — MORPHINE SULFATE 4 MG: 25 INJECTION, SOLUTION, CONCENTRATE INTRAVENOUS at 06:39

## 2018-05-31 RX ADMIN — SODIUM CHLORIDE, PRESERVATIVE FREE 500 UNITS: 5 INJECTION INTRAVENOUS at 08:40

## 2018-05-31 RX ADMIN — ASPIRIN 81 MG 324 MG: 81 TABLET ORAL at 07:12

## 2018-05-31 ASSESSMENT — PAIN SCALES - GENERAL
PAINLEVEL_OUTOF10: 7
PAINLEVEL_OUTOF10: 4
PAIN_LEVEL_AT_REST: 7
PAINLEVEL_OUTOF10: 1
PAIN_LEVEL_AT_REST: 4
PAIN_LEVEL_AT_REST: 1
PAINLEVEL_OUTOF10: 1
PAINLEVEL_OUTOF10: 7

## 2018-05-31 ASSESSMENT — ENCOUNTER SYMPTOMS
ABDOMINAL PAIN: 0
COLOR CHANGE: 1
VOMITING: 0
NAUSEA: 0
COUGH: 1
HEADACHES: 0
SHORTNESS OF BREATH: 1
DIARRHEA: 0
CHEST TIGHTNESS: 1
FEVER: 0

## 2018-05-31 ASSESSMENT — HEART SCORE
HEART SCORE: 6
HISTORY: HIGHLY SUSPICIOUS
RISK FACTORS: 1-2 RISK FACTORS
TROPONIN: 1-3X NORMAL LIMIT
EKG: NON SPECIFIC REPOLARIZATION DISTURBANCE
AGE: GREATER THAN 45 TO LESS THAN 65

## 2018-05-31 ASSESSMENT — MOVEMENT AND STRENGTH ASSESSMENTS: FACE_JAW: NO FACIAL DROOP

## 2018-06-01 ENCOUNTER — NURSE TRIAGE (OUTPATIENT)
Dept: INTERNAL MEDICINE | Age: 63
End: 2018-06-01

## 2018-06-01 ENCOUNTER — TELEPHONE (OUTPATIENT)
Dept: INTERNAL MEDICINE | Age: 63
End: 2018-06-01

## 2018-06-06 ENCOUNTER — OFFICE VISIT (OUTPATIENT)
Dept: ORTHOPEDICS | Age: 63
End: 2018-06-06

## 2018-06-06 DIAGNOSIS — G89.29 CHRONIC PAIN OF LEFT KNEE: Primary | ICD-10-CM

## 2018-06-06 DIAGNOSIS — M17.12 ARTHRITIS OF LEFT KNEE: ICD-10-CM

## 2018-06-06 DIAGNOSIS — M25.562 CHRONIC PAIN OF LEFT KNEE: Primary | ICD-10-CM

## 2018-06-06 DIAGNOSIS — M23.301 DERANGEMENT OF LATERAL MENISCUS OF LEFT KNEE: ICD-10-CM

## 2018-06-06 PROCEDURE — 99213 OFFICE O/P EST LOW 20 MIN: CPT | Performed by: PHYSICIAN ASSISTANT

## 2018-06-07 PROBLEM — R07.9 CHEST PAIN: Status: RESOLVED | Noted: 2018-05-24 | Resolved: 2018-06-07

## 2018-06-08 ENCOUNTER — TELEPHONE (OUTPATIENT)
Dept: INTERNAL MEDICINE | Age: 63
End: 2018-06-08

## 2018-06-08 ENCOUNTER — OFFICE VISIT (OUTPATIENT)
Dept: CARDIOLOGY | Age: 63
End: 2018-06-08

## 2018-06-08 ENCOUNTER — IMAGING SERVICES (OUTPATIENT)
Dept: CV DIAGNOSTICS | Age: 63
End: 2018-06-08
Attending: INTERNAL MEDICINE

## 2018-06-08 VITALS
HEART RATE: 60 BPM | RESPIRATION RATE: 16 BRPM | DIASTOLIC BLOOD PRESSURE: 78 MMHG | BODY MASS INDEX: 45.64 KG/M2 | SYSTOLIC BLOOD PRESSURE: 122 MMHG | WEIGHT: 274.25 LBS

## 2018-06-08 DIAGNOSIS — I25.10 CORONARY ARTERY DISEASE INVOLVING NATIVE CORONARY ARTERY OF NATIVE HEART WITHOUT ANGINA PECTORIS: ICD-10-CM

## 2018-06-08 DIAGNOSIS — I06.2 AORTIC VALVE STENOSIS AND INSUFFICIENCY, RHEUMATIC: ICD-10-CM

## 2018-06-08 DIAGNOSIS — I20.1 PRINZMETAL ANGINA (CMD): ICD-10-CM

## 2018-06-08 DIAGNOSIS — I25.10 CORONARY ARTERY DISEASE INVOLVING NATIVE CORONARY ARTERY OF NATIVE HEART WITHOUT ANGINA PECTORIS: Primary | ICD-10-CM

## 2018-06-08 DIAGNOSIS — E78.00 PURE HYPERCHOLESTEROLEMIA: ICD-10-CM

## 2018-06-08 DIAGNOSIS — R00.0 TACHYCARDIA: ICD-10-CM

## 2018-06-08 DIAGNOSIS — R00.0 TACHYCARDIA: Primary | ICD-10-CM

## 2018-06-08 DIAGNOSIS — I10 ESSENTIAL HYPERTENSION WITH GOAL BLOOD PRESSURE LESS THAN 140/90: ICD-10-CM

## 2018-06-08 PROCEDURE — 93225 XTRNL ECG REC<48 HRS REC: CPT | Performed by: INTERNAL MEDICINE

## 2018-06-08 PROCEDURE — 93227 XTRNL ECG REC<48 HR R&I: CPT | Performed by: INTERNAL MEDICINE

## 2018-06-08 PROCEDURE — 99213 OFFICE O/P EST LOW 20 MIN: CPT | Performed by: INTERNAL MEDICINE

## 2018-06-08 RX ORDER — FLUCONAZOLE 100 MG/1
100 TABLET ORAL DAILY
Qty: 7 TABLET | Refills: 0 | Status: SHIPPED | OUTPATIENT
Start: 2018-06-08 | End: 2018-07-06 | Stop reason: SDUPTHER

## 2018-06-13 ENCOUNTER — TELEPHONE (OUTPATIENT)
Dept: PULMONOLOGY | Age: 63
End: 2018-06-13

## 2018-06-13 ENCOUNTER — E-ADVICE (OUTPATIENT)
Dept: CARDIOLOGY | Age: 63
End: 2018-06-13

## 2018-06-14 ENCOUNTER — NURSE ONLY (OUTPATIENT)
Dept: PULMONOLOGY | Age: 63
End: 2018-06-14

## 2018-06-14 ENCOUNTER — TELEPHONE (OUTPATIENT)
Dept: PULMONOLOGY | Age: 63
End: 2018-06-14

## 2018-06-14 DIAGNOSIS — J45.50 SEVERE PERSISTENT ASTHMA, UNSPECIFIED WHETHER COMPLICATED: Primary | ICD-10-CM

## 2018-06-14 PROCEDURE — 96372 THER/PROPH/DIAG INJ SC/IM: CPT | Performed by: INTERNAL MEDICINE

## 2018-06-14 RX ORDER — METOPROLOL SUCCINATE 25 MG/1
50 TABLET, EXTENDED RELEASE ORAL DAILY
Status: CANCELLED | OUTPATIENT
Start: 2018-06-14

## 2018-06-15 ENCOUNTER — E-ADVICE (OUTPATIENT)
Dept: INTERNAL MEDICINE | Age: 63
End: 2018-06-15

## 2018-06-16 ENCOUNTER — IMAGING SERVICES (OUTPATIENT)
Dept: MRI IMAGING | Age: 63
End: 2018-06-16
Attending: PHYSICIAN ASSISTANT

## 2018-06-16 DIAGNOSIS — M25.562 CHRONIC PAIN OF LEFT KNEE: ICD-10-CM

## 2018-06-16 DIAGNOSIS — M23.301 DERANGEMENT OF LATERAL MENISCUS OF LEFT KNEE: ICD-10-CM

## 2018-06-16 DIAGNOSIS — G89.29 CHRONIC PAIN OF LEFT KNEE: ICD-10-CM

## 2018-06-16 DIAGNOSIS — M17.12 ARTHRITIS OF LEFT KNEE: ICD-10-CM

## 2018-06-16 PROCEDURE — 73721 MRI JNT OF LWR EXTRE W/O DYE: CPT | Performed by: RADIOLOGY

## 2018-06-18 ENCOUNTER — TELEPHONE (OUTPATIENT)
Dept: ORTHOPEDICS | Age: 63
End: 2018-06-18

## 2018-06-19 ENCOUNTER — OFFICE VISIT (OUTPATIENT)
Dept: ORTHOPEDICS | Age: 63
End: 2018-06-19

## 2018-06-19 ENCOUNTER — PREP FOR CASE (OUTPATIENT)
Dept: ORTHOPEDICS | Age: 63
End: 2018-06-19

## 2018-06-19 ENCOUNTER — TELEPHONE (OUTPATIENT)
Dept: CARDIOLOGY | Age: 63
End: 2018-06-19

## 2018-06-19 ENCOUNTER — OFFICE VISIT (OUTPATIENT)
Dept: INTERNAL MEDICINE | Age: 63
End: 2018-06-19

## 2018-06-19 VITALS — RESPIRATION RATE: 14 BRPM | WEIGHT: 266.98 LBS | BODY MASS INDEX: 44.43 KG/M2

## 2018-06-19 VITALS
TEMPERATURE: 98.7 F | DIASTOLIC BLOOD PRESSURE: 78 MMHG | OXYGEN SATURATION: 96 % | SYSTOLIC BLOOD PRESSURE: 120 MMHG | WEIGHT: 267 LBS | HEART RATE: 90 BPM | BODY MASS INDEX: 44.43 KG/M2

## 2018-06-19 DIAGNOSIS — Z01.818 PRE-OP EXAM: Primary | ICD-10-CM

## 2018-06-19 DIAGNOSIS — E66.01 MORBID OBESITY (CMD): ICD-10-CM

## 2018-06-19 DIAGNOSIS — J01.01 ACUTE RECURRENT MAXILLARY SINUSITIS: Primary | ICD-10-CM

## 2018-06-19 PROCEDURE — 99213 OFFICE O/P EST LOW 20 MIN: CPT | Performed by: ORTHOPAEDIC SURGERY

## 2018-06-19 PROCEDURE — 99213 OFFICE O/P EST LOW 20 MIN: CPT | Performed by: INTERNAL MEDICINE

## 2018-06-19 RX ORDER — METOPROLOL SUCCINATE 25 MG/1
25 TABLET, EXTENDED RELEASE ORAL DAILY
Qty: 30 TABLET | Refills: 11 | Status: SHIPPED | OUTPATIENT
Start: 2018-06-19 | End: 2018-06-20

## 2018-06-19 RX ORDER — BENZONATATE 100 MG/1
100-200 CAPSULE ORAL 3 TIMES DAILY PRN
Qty: 60 CAPSULE | Refills: 2 | Status: SHIPPED | OUTPATIENT
Start: 2018-06-19 | End: 2019-06-10 | Stop reason: CLARIF

## 2018-06-19 RX ORDER — DILTIAZEM HYDROCHLORIDE 240 MG/1
240 CAPSULE, EXTENDED RELEASE ORAL DAILY
Qty: 30 CAPSULE | Refills: 3 | Status: SHIPPED | OUTPATIENT
Start: 2018-06-19 | End: 2018-07-10 | Stop reason: DRUGHIGH

## 2018-06-19 RX ORDER — CLARITHROMYCIN 500 MG/1
500 TABLET, COATED ORAL 2 TIMES DAILY
Qty: 20 TABLET | Refills: 1 | Status: SHIPPED | OUTPATIENT
Start: 2018-06-19 | End: 2018-06-29

## 2018-06-19 RX ORDER — METOPROLOL SUCCINATE 25 MG/1
25 TABLET, EXTENDED RELEASE ORAL DAILY
Qty: 30 TABLET | Refills: 11 | Status: CANCELLED | OUTPATIENT
Start: 2018-06-19

## 2018-06-22 ENCOUNTER — OFFICE VISIT (OUTPATIENT)
Dept: NEUROLOGY | Age: 63
End: 2018-06-22

## 2018-06-22 ENCOUNTER — OFFICE VISIT (OUTPATIENT)
Dept: OPHTHALMOLOGY | Age: 63
End: 2018-06-22

## 2018-06-22 VITALS
BODY MASS INDEX: 44.26 KG/M2 | RESPIRATION RATE: 14 BRPM | HEART RATE: 96 BPM | SYSTOLIC BLOOD PRESSURE: 120 MMHG | DIASTOLIC BLOOD PRESSURE: 78 MMHG | WEIGHT: 266 LBS

## 2018-06-22 DIAGNOSIS — E11.9 TYPE 2 DIABETES MELLITUS WITHOUT COMPLICATION, WITHOUT LONG-TERM CURRENT USE OF INSULIN (CMD): Primary | ICD-10-CM

## 2018-06-22 DIAGNOSIS — H52.7 REFRACTIVE ERROR: ICD-10-CM

## 2018-06-22 DIAGNOSIS — H25.813 COMBINED FORMS OF AGE-RELATED CATARACT OF BOTH EYES: ICD-10-CM

## 2018-06-22 DIAGNOSIS — H40.039 ANATOMICAL NARROW ANGLE: ICD-10-CM

## 2018-06-22 DIAGNOSIS — G25.0 ESSENTIAL TREMOR: ICD-10-CM

## 2018-06-22 DIAGNOSIS — G43.009 MIGRAINE WITHOUT AURA AND WITHOUT STATUS MIGRAINOSUS, NOT INTRACTABLE: ICD-10-CM

## 2018-06-22 DIAGNOSIS — G31.84 MILD COGNITIVE IMPAIRMENT WITH MEMORY LOSS: Primary | ICD-10-CM

## 2018-06-22 PROCEDURE — 99214 OFFICE O/P EST MOD 30 MIN: CPT | Performed by: PSYCHIATRY & NEUROLOGY

## 2018-06-22 PROCEDURE — 92004 COMPRE OPH EXAM NEW PT 1/>: CPT | Performed by: OPHTHALMOLOGY

## 2018-06-22 PROCEDURE — 92015 DETERMINE REFRACTIVE STATE: CPT | Performed by: OPHTHALMOLOGY

## 2018-06-22 RX ORDER — GABAPENTIN 100 MG/1
200 CAPSULE ORAL 2 TIMES DAILY
Qty: 120 CAPSULE | Refills: 1 | Status: SHIPPED | OUTPATIENT
Start: 2018-06-22 | End: 2018-09-04 | Stop reason: SDUPTHER

## 2018-06-22 ASSESSMENT — REFRACTION_MANIFEST
OS_AXIS: 155
OS_ADD: +2.50
OS_CYLINDER: +0.50
OD_SPHERE: +0.75
OS_SPHERE: +0.25
OD_ADD: +2.50
OD_CYLINDER: +0.50
OD_AXIS: 180

## 2018-06-22 ASSESSMENT — REFRACTION_WEARINGRX
OD_SPHERE: +0.75
OS_SPHERE: +1.00
OD_CYLINDER: +0.50
SPECS_TYPE: PROGRESSIVE
OS_AXIS: 171
OS_ADD: +2.50
OD_ADD: +2.50
OD_AXIS: 179
OS_CYLINDER: +0.75

## 2018-06-22 ASSESSMENT — VISUAL ACUITY
OD_CC: 20/30
CORRECTION_TYPE: GLASSES
OS_PH_CC: 20/25
OS_CC: 20/25
METHOD: SNELLEN - LINEAR
OD_CC: 20/25-1
OS_CC: 20/40
OS_CC+: -1

## 2018-06-22 ASSESSMENT — TONOMETRY
OD_IOP_MMHG: 14
IOP_METHOD: APPLANATION
OS_IOP_MMHG: 14

## 2018-06-22 ASSESSMENT — EXTERNAL EXAM - RIGHT EYE: OD_EXAM: NORMAL

## 2018-06-22 ASSESSMENT — CONF VISUAL FIELD
OS_NORMAL: 1
METHOD: COUNTING FINGERS
OD_NORMAL: 1

## 2018-06-22 ASSESSMENT — CUP TO DISC RATIO
OS_RATIO: 0.3
OD_RATIO: 0.3

## 2018-06-22 ASSESSMENT — ENCOUNTER SYMPTOMS
RESPIRATORY NEGATIVE: 1
NEUROLOGICAL NEGATIVE: 1
ENDOCRINE NEGATIVE: 1
ALLERGIC/IMMUNOLOGIC NEGATIVE: 0
CONSTITUTIONAL NEGATIVE: 0
EYES NEGATIVE: 1
HEMATOLOGIC/LYMPHATIC NEGATIVE: 0
PSYCHIATRIC NEGATIVE: 0
GASTROINTESTINAL NEGATIVE: 0

## 2018-06-22 ASSESSMENT — SLIT LAMP EXAM - LIDS
COMMENTS: NORMAL
COMMENTS: NORMAL

## 2018-06-22 ASSESSMENT — EXTERNAL EXAM - LEFT EYE: OS_EXAM: NORMAL

## 2018-06-23 RX ORDER — ISOSORBIDE DINITRATE 10 MG/1
10 TABLET ORAL 3 TIMES DAILY
Qty: 90 TABLET | Refills: 0 | Status: CANCELLED | OUTPATIENT
Start: 2018-06-23

## 2018-06-25 DIAGNOSIS — I20.1 PRINZMETAL ANGINA (CMD): Primary | ICD-10-CM

## 2018-06-25 DIAGNOSIS — I25.10 CORONARY ARTERY DISEASE INVOLVING NATIVE CORONARY ARTERY OF NATIVE HEART WITHOUT ANGINA PECTORIS: ICD-10-CM

## 2018-06-25 RX ORDER — ISOSORBIDE DINITRATE 10 MG/1
10 TABLET ORAL 3 TIMES DAILY
Qty: 90 TABLET | Refills: 1 | Status: SHIPPED | OUTPATIENT
Start: 2018-06-25 | End: 2018-08-09 | Stop reason: SDUPTHER

## 2018-06-26 ENCOUNTER — OFFICE VISIT (OUTPATIENT)
Dept: INTERNAL MEDICINE | Age: 63
End: 2018-06-26

## 2018-06-26 ENCOUNTER — E-ADVICE (OUTPATIENT)
Dept: INTERNAL MEDICINE | Age: 63
End: 2018-06-26

## 2018-06-26 VITALS
TEMPERATURE: 98.8 F | WEIGHT: 262 LBS | SYSTOLIC BLOOD PRESSURE: 110 MMHG | BODY MASS INDEX: 43.6 KG/M2 | DIASTOLIC BLOOD PRESSURE: 64 MMHG | HEART RATE: 92 BPM

## 2018-06-26 DIAGNOSIS — J45.909 ACUTE ASTHMATIC BRONCHITIS: Primary | ICD-10-CM

## 2018-06-26 PROCEDURE — 99213 OFFICE O/P EST LOW 20 MIN: CPT | Performed by: INTERNAL MEDICINE

## 2018-06-26 RX ORDER — PREDNISONE 20 MG/1
TABLET ORAL
Qty: 14 TABLET | Refills: 1 | Status: SHIPPED | OUTPATIENT
Start: 2018-06-26 | End: 2018-07-19 | Stop reason: ALTCHOICE

## 2018-06-26 RX ORDER — LEVOFLOXACIN 500 MG/1
500 TABLET, FILM COATED ORAL DAILY
Qty: 10 TABLET | Refills: 0 | Status: SHIPPED | OUTPATIENT
Start: 2018-06-26 | End: 2018-07-06

## 2018-06-27 ENCOUNTER — TELEPHONE (OUTPATIENT)
Dept: ORTHOPEDICS | Age: 63
End: 2018-06-27

## 2018-06-28 LAB
BUN SERPL-MCNC: 14 MG/DL (ref 8–27)
BUN/CREAT SERPL: 9 (ref 12–28)
CALCIUM SERPL-MCNC: 9.1 MG/DL (ref 8.7–10.3)
CHLORIDE SERPL-SCNC: 94 MMOL/L (ref 96–106)
CO2 SERPL-SCNC: ABNORMAL MMOL/L
CREAT SERPL-MCNC: 1.48 MG/DL (ref 0.57–1)
GLUCOSE SERPL-MCNC: 139 MG/DL (ref 65–99)
IGA SERPL-MCNC: 169 MG/DL (ref 87–352)
IGG SERPL-MCNC: 817 MG/DL (ref 700–1600)
IGM SERPL-MCNC: 28 MG/DL (ref 26–217)
LENGTH OF FAST TIME PATIENT: NO H
POTASSIUM SERPL-SCNC: ABNORMAL MMOL/L
SODIUM SERPL-SCNC: 139 MMOL/L (ref 134–144)

## 2018-07-02 ENCOUNTER — TELEPHONE (OUTPATIENT)
Dept: ORTHOPEDICS | Age: 63
End: 2018-07-02

## 2018-07-05 ENCOUNTER — E-ADVICE (OUTPATIENT)
Dept: NEPHROLOGY | Age: 63
End: 2018-07-05

## 2018-07-09 RX ORDER — FLUCONAZOLE 100 MG/1
100 TABLET ORAL DAILY
Qty: 7 TABLET | Refills: 0 | Status: SHIPPED | OUTPATIENT
Start: 2018-07-09 | End: 2018-07-19 | Stop reason: SDUPTHER

## 2018-07-10 ENCOUNTER — TELEPHONE (OUTPATIENT)
Dept: INTERNAL MEDICINE | Age: 63
End: 2018-07-10

## 2018-07-10 ENCOUNTER — TELEPHONE (OUTPATIENT)
Dept: ORTHOPEDICS | Age: 63
End: 2018-07-10

## 2018-07-10 RX ORDER — DILTIAZEM HYDROCHLORIDE 180 MG/1
180 CAPSULE, EXTENDED RELEASE ORAL DAILY
Status: ON HOLD | COMMUNITY
Start: 2018-07-10 | End: 2018-07-14 | Stop reason: SDUPTHER

## 2018-07-10 RX ORDER — FUROSEMIDE 40 MG/1
TABLET ORAL
Qty: 30 TABLET | Refills: 11 | Status: SHIPPED | COMMUNITY
Start: 2018-07-10 | End: 2018-07-12 | Stop reason: SDUPTHER

## 2018-07-11 ENCOUNTER — TELEPHONE (OUTPATIENT)
Dept: INTERNAL MEDICINE | Age: 63
End: 2018-07-11

## 2018-07-11 RX ORDER — CLARITHROMYCIN 500 MG/1
500 TABLET, COATED ORAL 2 TIMES DAILY
Qty: 20 TABLET | Refills: 0 | Status: SHIPPED | OUTPATIENT
Start: 2018-07-11 | End: 2018-07-19 | Stop reason: ALTCHOICE

## 2018-07-12 ENCOUNTER — TELEPHONE (OUTPATIENT)
Dept: INTERNAL MEDICINE | Age: 63
End: 2018-07-12

## 2018-07-12 ENCOUNTER — OFFICE VISIT (OUTPATIENT)
Dept: PULMONOLOGY | Age: 63
End: 2018-07-12

## 2018-07-12 VITALS
DIASTOLIC BLOOD PRESSURE: 80 MMHG | SYSTOLIC BLOOD PRESSURE: 122 MMHG | HEIGHT: 65 IN | WEIGHT: 276.46 LBS | BODY MASS INDEX: 46.06 KG/M2 | HEART RATE: 101 BPM | OXYGEN SATURATION: 92 %

## 2018-07-12 DIAGNOSIS — R60.1 GENERALIZED EDEMA: Primary | ICD-10-CM

## 2018-07-12 DIAGNOSIS — J45.50 UNCONTROLLED SEVERE PERSISTENT ASTHMA: Primary | ICD-10-CM

## 2018-07-12 PROCEDURE — 96372 THER/PROPH/DIAG INJ SC/IM: CPT | Performed by: INTERNAL MEDICINE

## 2018-07-12 PROCEDURE — 99213 OFFICE O/P EST LOW 20 MIN: CPT | Performed by: INTERNAL MEDICINE

## 2018-07-12 RX ORDER — CODEINE PHOSPHATE AND GUAIFENESIN 10; 100 MG/5ML; MG/5ML
5 SOLUTION ORAL 3 TIMES DAILY PRN
Qty: 240 ML | Refills: 0 | Status: SHIPPED | OUTPATIENT
Start: 2018-07-12 | End: 2018-11-05 | Stop reason: SDUPTHER

## 2018-07-12 RX ORDER — LORATADINE 10 MG/1
10 TABLET ORAL DAILY
Qty: 90 TABLET | Refills: 3 | Status: SHIPPED | OUTPATIENT
Start: 2018-07-12 | End: 2018-09-27 | Stop reason: SDUPTHER

## 2018-07-12 RX ORDER — POTASSIUM CHLORIDE 750 MG/1
20 TABLET, EXTENDED RELEASE ORAL DAILY
Qty: 60 TABLET | Refills: 3 | Status: SHIPPED | OUTPATIENT
Start: 2018-07-12 | End: 2018-07-19 | Stop reason: SDUPTHER

## 2018-07-12 RX ORDER — FUROSEMIDE 40 MG/1
TABLET ORAL
Qty: 30 TABLET | Refills: 11 | Status: SHIPPED | OUTPATIENT
Start: 2018-07-12 | End: 2018-07-12

## 2018-07-12 RX ORDER — BENZONATATE 100 MG/1
100 CAPSULE ORAL 3 TIMES DAILY PRN
Qty: 20 CAPSULE | Refills: 0 | Status: ON HOLD | OUTPATIENT
Start: 2018-07-12 | End: 2018-07-14 | Stop reason: SDUPTHER

## 2018-07-12 RX ORDER — FUROSEMIDE 40 MG/1
TABLET ORAL
Qty: 30 TABLET | Refills: 11 | Status: CANCELLED | OUTPATIENT
Start: 2018-07-12

## 2018-07-13 RX ORDER — FUROSEMIDE 40 MG/1
TABLET ORAL
Qty: 30 TABLET | Refills: 11 | Status: SHIPPED | OUTPATIENT
Start: 2018-07-13 | End: 2018-07-19 | Stop reason: DRUGHIGH

## 2018-07-14 ENCOUNTER — APPOINTMENT (OUTPATIENT)
Dept: LAB | Age: 63
End: 2018-07-14

## 2018-07-14 ENCOUNTER — HOSPITAL ENCOUNTER (INPATIENT)
Age: 63
LOS: 3 days | Discharge: HOME | DRG: 948 | End: 2018-07-17
Attending: HOSPITALIST | Admitting: HOSPITALIST

## 2018-07-14 ENCOUNTER — WALK IN (OUTPATIENT)
Dept: URGENT CARE | Age: 63
End: 2018-07-14

## 2018-07-14 DIAGNOSIS — L03.116 BILATERAL LOWER LEG CELLULITIS: ICD-10-CM

## 2018-07-14 DIAGNOSIS — L03.115 BILATERAL LOWER LEG CELLULITIS: ICD-10-CM

## 2018-07-14 DIAGNOSIS — J98.01 ACUTE BRONCHOSPASM: ICD-10-CM

## 2018-07-14 DIAGNOSIS — R60.0 BILATERAL LEG EDEMA: Primary | ICD-10-CM

## 2018-07-14 LAB
ALBUMIN SERPL-MCNC: 3.7 G/DL (ref 3.6–5.1)
ALBUMIN/GLOB SERPL: 1.1 {RATIO} (ref 1–2.4)
ALP SERPL-CCNC: 71 UNITS/L (ref 45–117)
ALT SERPL-CCNC: 28 UNITS/L
ANION GAP SERPL CALC-SCNC: 16 MMOL/L (ref 10–20)
APPEARANCE UR: CLEAR
AST SERPL-CCNC: 39 UNITS/L
BACTERIA #/AREA URNS HPF: NORMAL /HPF
BASOPHILS # BLD AUTO: 0 K/MCL (ref 0–0.3)
BASOPHILS NFR BLD AUTO: 0 %
BILIRUB SERPL-MCNC: 0.5 MG/DL (ref 0.2–1)
BILIRUB UR QL STRIP: NEGATIVE
BUN SERPL-MCNC: 11 MG/DL (ref 6–20)
BUN/CREAT SERPL: 9 (ref 7–25)
CALCIUM SERPL-MCNC: 9.1 MG/DL (ref 8.4–10.2)
CHLORIDE SERPL-SCNC: 95 MMOL/L (ref 98–107)
CO2 SERPL-SCNC: 29 MMOL/L (ref 21–32)
COLOR UR: YELLOW
CREAT SERPL-MCNC: 1.2 MG/DL (ref 0.51–0.95)
DIFFERENTIAL METHOD BLD: ABNORMAL
EOSINOPHIL # BLD AUTO: 0 K/MCL (ref 0.1–0.5)
EOSINOPHIL NFR SPEC: 0 %
ERYTHROCYTE [DISTWIDTH] IN BLOOD: 13.6 % (ref 11–15)
FASTING STATUS PATIENT QL REPORTED: ABNORMAL HRS
GLOBULIN SER-MCNC: 3.3 G/DL (ref 2–4)
GLUCOSE BLDC GLUCOMTR-MCNC: 109 MG/DL (ref 65–99)
GLUCOSE SERPL-MCNC: 145 MG/DL (ref 65–99)
GLUCOSE UR STRIP-MCNC: NEGATIVE MG/DL
HCT VFR BLD CALC: 34.3 % (ref 36–46.5)
HGB BLD-MCNC: 11.6 G/DL (ref 12–15.5)
HGB UR QL STRIP: NEGATIVE
HYALINE CASTS #/AREA URNS LPF: NORMAL /LPF (ref 0–5)
KETONES UR STRIP-MCNC: NEGATIVE MG/DL
LACTATE SERPL-SCNC: 1.1 MMOL/L (ref 0–2)
LEUKOCYTE ESTERASE UR QL STRIP: NEGATIVE
LYMPHOCYTES # BLD MANUAL: 1.2 K/MCL (ref 1–4)
LYMPHOCYTES NFR BLD MANUAL: 17 %
MCH RBC QN AUTO: 30.7 PG (ref 26–34)
MCHC RBC AUTO-ENTMCNC: 33.8 G/DL (ref 32–36.5)
MCV RBC AUTO: 90.7 FL (ref 78–100)
MONOCYTES # BLD MANUAL: 0.6 K/MCL (ref 0.3–0.9)
MONOCYTES NFR BLD MANUAL: 9 %
NEUTROPHILS # BLD: 5 K/MCL (ref 1.8–7.7)
NEUTROPHILS NFR BLD AUTO: 74 %
NITRITE UR QL STRIP: NEGATIVE
PH UR STRIP: 6.5 UNITS (ref 5–7)
PLATELET # BLD: 229 K/MCL (ref 140–450)
POTASSIUM SERPL-SCNC: 4.1 MMOL/L (ref 3.4–5.1)
PROCALCITONIN SERPL-MCNC: <0.05 NG/ML
PROT SERPL-MCNC: 7 G/DL (ref 6.4–8.2)
PROT UR STRIP-MCNC: NEGATIVE MG/DL
RBC # BLD: 3.78 MIL/MCL (ref 4–5.2)
RBC #/AREA URNS HPF: NORMAL /HPF (ref 0–3)
SODIUM SERPL-SCNC: 136 MMOL/L (ref 135–145)
SP GR UR STRIP: 1.01 (ref 1–1.03)
SPECIMEN SOURCE: NORMAL
SQUAMOUS #/AREA URNS HPF: NORMAL /HPF (ref 0–5)
TROPONIN I SERPL-MCNC: <0.02 NG/ML
UROBILINOGEN UR STRIP-MCNC: 0.2 MG/DL (ref 0–1)
WBC # BLD: 6.8 K/MCL (ref 4.2–11)
WBC #/AREA URNS HPF: NORMAL /HPF (ref 0–5)

## 2018-07-14 PROCEDURE — 99214 OFFICE O/P EST MOD 30 MIN: CPT | Performed by: FAMILY MEDICINE

## 2018-07-14 PROCEDURE — 99222 1ST HOSP IP/OBS MODERATE 55: CPT | Performed by: HOSPITALIST

## 2018-07-14 PROCEDURE — 81001 URINALYSIS AUTO W/SCOPE: CPT | Performed by: INTERNAL MEDICINE

## 2018-07-14 PROCEDURE — 82962 GLUCOSE BLOOD TEST: CPT

## 2018-07-14 PROCEDURE — 10002807 HB RX 258: Performed by: HOSPITALIST

## 2018-07-14 PROCEDURE — 80053 COMPREHEN METABOLIC PANEL: CPT | Performed by: INTERNAL MEDICINE

## 2018-07-14 PROCEDURE — 85025 COMPLETE CBC W/AUTO DIFF WBC: CPT | Performed by: INTERNAL MEDICINE

## 2018-07-14 PROCEDURE — 10002800 HB RX 250 W HCPCS: Performed by: HOSPITALIST

## 2018-07-14 PROCEDURE — 84484 ASSAY OF TROPONIN QUANT: CPT

## 2018-07-14 PROCEDURE — 36415 COLL VENOUS BLD VENIPUNCTURE: CPT | Performed by: INTERNAL MEDICINE

## 2018-07-14 PROCEDURE — 83605 ASSAY OF LACTIC ACID: CPT

## 2018-07-14 PROCEDURE — 10002803 HB RX 637: Performed by: HOSPITALIST

## 2018-07-14 PROCEDURE — 10004651 HB RX, NO CHARGE ITEM: Performed by: HOSPITALIST

## 2018-07-14 PROCEDURE — 84145 PROCALCITONIN (PCT): CPT

## 2018-07-14 PROCEDURE — 10000002 HB ROOM CHARGE MED SURG

## 2018-07-14 RX ORDER — POTASSIUM CHLORIDE 1.5 G/1.58G
40 POWDER, FOR SOLUTION ORAL EVERY 4 HOURS PRN
Status: DISCONTINUED | OUTPATIENT
Start: 2018-07-14 | End: 2018-07-17 | Stop reason: HOSPADM

## 2018-07-14 RX ORDER — CLONAZEPAM 1 MG/1
1 TABLET ORAL NIGHTLY PRN
Status: DISCONTINUED | OUTPATIENT
Start: 2018-07-14 | End: 2018-07-17 | Stop reason: HOSPADM

## 2018-07-14 RX ORDER — POTASSIUM CHLORIDE 20 MEQ/1
40 TABLET, EXTENDED RELEASE ORAL EVERY 4 HOURS PRN
Status: DISCONTINUED | OUTPATIENT
Start: 2018-07-14 | End: 2018-07-17 | Stop reason: HOSPADM

## 2018-07-14 RX ORDER — DEXTROSE MONOHYDRATE 25 G/50ML
25 INJECTION, SOLUTION INTRAVENOUS PRN
Status: DISCONTINUED | OUTPATIENT
Start: 2018-07-14 | End: 2018-07-17 | Stop reason: HOSPADM

## 2018-07-14 RX ORDER — ALBUTEROL SULFATE 90 UG/1
2 AEROSOL, METERED RESPIRATORY (INHALATION) EVERY 4 HOURS PRN
Status: DISCONTINUED | OUTPATIENT
Start: 2018-07-14 | End: 2018-07-17 | Stop reason: HOSPADM

## 2018-07-14 RX ORDER — FUROSEMIDE 10 MG/ML
60 INJECTION INTRAMUSCULAR; INTRAVENOUS 2 TIMES DAILY
Status: DISCONTINUED | OUTPATIENT
Start: 2018-07-14 | End: 2018-07-16

## 2018-07-14 RX ORDER — TRAMADOL HYDROCHLORIDE 50 MG/1
50 TABLET ORAL EVERY 6 HOURS PRN
Status: DISCONTINUED | OUTPATIENT
Start: 2018-07-14 | End: 2018-07-17 | Stop reason: HOSPADM

## 2018-07-14 RX ORDER — DULOXETIN HYDROCHLORIDE 60 MG/1
60 CAPSULE, DELAYED RELEASE ORAL 2 TIMES DAILY
Status: DISCONTINUED | OUTPATIENT
Start: 2018-07-14 | End: 2018-07-17 | Stop reason: HOSPADM

## 2018-07-14 RX ORDER — IPRATROPIUM BROMIDE AND ALBUTEROL SULFATE 2.5; .5 MG/3ML; MG/3ML
3 SOLUTION RESPIRATORY (INHALATION) EVERY 4 HOURS PRN
Status: DISCONTINUED | OUTPATIENT
Start: 2018-07-14 | End: 2018-07-17 | Stop reason: HOSPADM

## 2018-07-14 RX ORDER — ISOSORBIDE DINITRATE 10 MG/1
10 TABLET ORAL
Status: DISCONTINUED | OUTPATIENT
Start: 2018-07-15 | End: 2018-07-17 | Stop reason: HOSPADM

## 2018-07-14 RX ORDER — PANTOPRAZOLE SODIUM 40 MG/1
40 TABLET, DELAYED RELEASE ORAL
Status: DISCONTINUED | OUTPATIENT
Start: 2018-07-15 | End: 2018-07-17 | Stop reason: HOSPADM

## 2018-07-14 RX ORDER — DEXTROSE MONOHYDRATE 50 MG/ML
INJECTION, SOLUTION INTRAVENOUS CONTINUOUS PRN
Status: DISCONTINUED | OUTPATIENT
Start: 2018-07-14 | End: 2018-07-17 | Stop reason: HOSPADM

## 2018-07-14 RX ORDER — LEVOTHYROXINE SODIUM 0.05 MG/1
50 TABLET ORAL DAILY
Status: DISCONTINUED | OUTPATIENT
Start: 2018-07-15 | End: 2018-07-17 | Stop reason: HOSPADM

## 2018-07-14 RX ORDER — FAMOTIDINE 20 MG/1
20 TABLET, FILM COATED ORAL 2 TIMES DAILY
Status: DISCONTINUED | OUTPATIENT
Start: 2018-07-14 | End: 2018-07-15

## 2018-07-14 RX ORDER — 0.9 % SODIUM CHLORIDE 0.9 %
2 VIAL (ML) INJECTION EVERY 12 HOURS SCHEDULED
Status: DISCONTINUED | OUTPATIENT
Start: 2018-07-14 | End: 2018-07-17 | Stop reason: HOSPADM

## 2018-07-14 RX ORDER — GABAPENTIN 100 MG/1
200 CAPSULE ORAL 2 TIMES DAILY
Status: DISCONTINUED | OUTPATIENT
Start: 2018-07-15 | End: 2018-07-17 | Stop reason: HOSPADM

## 2018-07-14 RX ORDER — POTASSIUM CHLORIDE 1.5 G/1.58G
20 POWDER, FOR SOLUTION ORAL EVERY 4 HOURS PRN
Status: DISCONTINUED | OUTPATIENT
Start: 2018-07-14 | End: 2018-07-17 | Stop reason: HOSPADM

## 2018-07-14 RX ORDER — GABAPENTIN 300 MG/1
300 CAPSULE ORAL NIGHTLY
Status: DISCONTINUED | OUTPATIENT
Start: 2018-07-14 | End: 2018-07-17 | Stop reason: HOSPADM

## 2018-07-14 RX ORDER — ENOXAPARIN SODIUM 100 MG/ML
40 INJECTION SUBCUTANEOUS EVERY 24 HOURS
Status: DISCONTINUED | OUTPATIENT
Start: 2018-07-15 | End: 2018-07-17 | Stop reason: HOSPADM

## 2018-07-14 RX ORDER — 0.9 % SODIUM CHLORIDE 0.9 %
2 VIAL (ML) INJECTION PRN
Status: DISCONTINUED | OUTPATIENT
Start: 2018-07-14 | End: 2018-07-17 | Stop reason: HOSPADM

## 2018-07-14 RX ORDER — POTASSIUM CHLORIDE 20 MEQ/1
20 TABLET, EXTENDED RELEASE ORAL EVERY 4 HOURS PRN
Status: DISCONTINUED | OUTPATIENT
Start: 2018-07-14 | End: 2018-07-17 | Stop reason: HOSPADM

## 2018-07-14 RX ORDER — BUPROPION HYDROCHLORIDE 150 MG/1
300 TABLET ORAL DAILY
Status: DISCONTINUED | OUTPATIENT
Start: 2018-07-15 | End: 2018-07-17 | Stop reason: HOSPADM

## 2018-07-14 RX ORDER — RANITIDINE 150 MG/1
150 TABLET ORAL 2 TIMES DAILY
Status: ON HOLD | COMMUNITY
End: 2020-06-06 | Stop reason: HOSPADM

## 2018-07-14 RX ORDER — NICOTINE POLACRILEX 4 MG
15 LOZENGE BUCCAL PRN
Status: DISCONTINUED | OUTPATIENT
Start: 2018-07-14 | End: 2018-07-17 | Stop reason: HOSPADM

## 2018-07-14 RX ADMIN — MEROPENEM 500 MG: 500 INJECTION, POWDER, FOR SOLUTION INTRAVENOUS at 23:02

## 2018-07-14 RX ADMIN — FAMOTIDINE 20 MG: 20 TABLET ORAL at 23:00

## 2018-07-14 RX ADMIN — DULOXETINE 60 MG: 60 CAPSULE, DELAYED RELEASE ORAL at 23:00

## 2018-07-14 RX ADMIN — FUROSEMIDE 60 MG: 10 INJECTION, SOLUTION INTRAVENOUS at 22:50

## 2018-07-14 RX ADMIN — METOPROLOL TARTRATE 25 MG: 25 TABLET ORAL at 23:00

## 2018-07-14 RX ADMIN — SODIUM CHLORIDE, PRESERVATIVE FREE 2 ML: 5 INJECTION INTRAVENOUS at 23:02

## 2018-07-14 RX ADMIN — CLONAZEPAM 1 MG: 1 TABLET ORAL at 23:16

## 2018-07-14 RX ADMIN — ACETAMINOPHEN AND CODEINE PHOSPHATE 2 TABLET: 300; 30 TABLET ORAL at 22:30

## 2018-07-14 RX ADMIN — GABAPENTIN 300 MG: 300 CAPSULE ORAL at 23:01

## 2018-07-14 ASSESSMENT — LIFESTYLE VARIABLES
AUDIT-C TOTAL SCORE: 0
ALCOHOL_USE_STATUS: NO OR LOW RISK WITH VALIDATED TOOL
HOW OFTEN DO YOU HAVE A DRINK CONTAINING ALCOHOL: NEVER
E-CIGARETTE_USE: NO E-CIGARETTES USE IN THE LAST 30 DAYS
HOW MANY STANDARD DRINKS CONTAINING ALCOHOL DO YOU HAVE ON A TYPICAL DAY: 0,1 OR 2
HOW OFTEN DO YOU HAVE 6 OR MORE DRINKS ON ONE OCCASION: NEVER

## 2018-07-14 ASSESSMENT — ACTIVITIES OF DAILY LIVING (ADL)
ADL_SCORE: 12
ADL_BEFORE_ADMISSION: INDEPENDENT
RECENT_DECLINE_ADL: NO
CHRONIC_PAIN_PRESENT: NO
ADL_SHORT_OF_BREATH: NO

## 2018-07-14 ASSESSMENT — PAIN SCALES - GENERAL
PAIN_LEVEL_AT_REST: 7
PAINLEVEL: 5-6
PAIN_LEVEL_AT_REST: 5

## 2018-07-15 ENCOUNTER — APPOINTMENT (OUTPATIENT)
Dept: ULTRASOUND IMAGING | Age: 63
DRG: 948 | End: 2018-07-15
Attending: HOSPITALIST

## 2018-07-15 LAB
ANION GAP SERPL CALC-SCNC: 9 MMOL/L (ref 10–20)
BNP SERPL-MCNC: 138 PG/ML
BUN SERPL-MCNC: 12 MG/DL (ref 6–20)
BUN/CREAT SERPL: 9 (ref 7–25)
CALCIUM SERPL-MCNC: 8.7 MG/DL (ref 8.4–10.2)
CHLORIDE SERPL-SCNC: 100 MMOL/L (ref 98–107)
CO2 SERPL-SCNC: 32 MMOL/L (ref 21–32)
CREAT SERPL-MCNC: 1.39 MG/DL (ref 0.51–0.95)
ERYTHROCYTE [DISTWIDTH] IN BLOOD: 13.5 % (ref 11–15)
GLUCOSE BLDC GLUCOMTR-MCNC: 128 MG/DL (ref 65–99)
GLUCOSE BLDC GLUCOMTR-MCNC: 139 MG/DL (ref 65–99)
GLUCOSE BLDC GLUCOMTR-MCNC: 147 MG/DL (ref 65–99)
GLUCOSE BLDC GLUCOMTR-MCNC: 97 MG/DL (ref 65–99)
GLUCOSE SERPL-MCNC: 152 MG/DL (ref 65–99)
HCT VFR BLD CALC: 35.8 % (ref 36–46.5)
HGB BLD-MCNC: 12.2 G/DL (ref 12–15.5)
MCH RBC QN AUTO: 31.4 PG (ref 26–34)
MCHC RBC AUTO-ENTMCNC: 34.1 G/DL (ref 32–36.5)
MCV RBC AUTO: 92 FL (ref 78–100)
PLATELET # BLD: 204 K/MCL (ref 140–450)
POTASSIUM SERPL-SCNC: 3.7 MMOL/L (ref 3.4–5.1)
RBC # BLD: 3.89 MIL/MCL (ref 4–5.2)
SODIUM SERPL-SCNC: 137 MMOL/L (ref 135–145)
TROPONIN I SERPL-MCNC: 0.02 NG/ML
WBC # BLD: 5.7 K/MCL (ref 4.2–11)

## 2018-07-15 PROCEDURE — 10002803 HB RX 637: Performed by: HOSPITALIST

## 2018-07-15 PROCEDURE — 83880 ASSAY OF NATRIURETIC PEPTIDE: CPT

## 2018-07-15 PROCEDURE — 10002800 HB RX 250 W HCPCS: Performed by: HOSPITALIST

## 2018-07-15 PROCEDURE — 10004651 HB RX, NO CHARGE ITEM: Performed by: HOSPITALIST

## 2018-07-15 PROCEDURE — 80048 BASIC METABOLIC PNL TOTAL CA: CPT

## 2018-07-15 PROCEDURE — 93970 EXTREMITY STUDY: CPT

## 2018-07-15 PROCEDURE — 93970 EXTREMITY STUDY: CPT | Performed by: RADIOLOGY

## 2018-07-15 PROCEDURE — 10002807 HB RX 258: Performed by: HOSPITALIST

## 2018-07-15 PROCEDURE — 84484 ASSAY OF TROPONIN QUANT: CPT

## 2018-07-15 PROCEDURE — 85027 COMPLETE CBC AUTOMATED: CPT

## 2018-07-15 PROCEDURE — 82962 GLUCOSE BLOOD TEST: CPT

## 2018-07-15 PROCEDURE — 99232 SBSQ HOSP IP/OBS MODERATE 35: CPT | Performed by: HOSPITALIST

## 2018-07-15 PROCEDURE — 10000002 HB ROOM CHARGE MED SURG

## 2018-07-15 RX ORDER — TIZANIDINE 4 MG/1
4 TABLET ORAL EVERY 8 HOURS PRN
Status: DISCONTINUED | OUTPATIENT
Start: 2018-07-15 | End: 2018-07-17 | Stop reason: HOSPADM

## 2018-07-15 RX ORDER — FERROUS SULFATE 325(65) MG
325 TABLET ORAL 2 TIMES DAILY WITH MEALS
Status: DISCONTINUED | OUTPATIENT
Start: 2018-07-15 | End: 2018-07-17 | Stop reason: HOSPADM

## 2018-07-15 RX ORDER — LORATADINE 10 MG/1
10 TABLET ORAL DAILY
Status: DISCONTINUED | OUTPATIENT
Start: 2018-07-15 | End: 2018-07-17 | Stop reason: HOSPADM

## 2018-07-15 RX ORDER — HYDROXYZINE HYDROCHLORIDE 25 MG/1
50 TABLET, FILM COATED ORAL AT BEDTIME
Status: DISCONTINUED | OUTPATIENT
Start: 2018-07-15 | End: 2018-07-17 | Stop reason: HOSPADM

## 2018-07-15 RX ORDER — POTASSIUM CHLORIDE 20 MEQ/1
20 TABLET, EXTENDED RELEASE ORAL
Status: DISCONTINUED | OUTPATIENT
Start: 2018-07-15 | End: 2018-07-17 | Stop reason: HOSPADM

## 2018-07-15 RX ORDER — TIOTROPIUM BROMIDE 18 UG/1
18 CAPSULE ORAL; RESPIRATORY (INHALATION)
Status: DISCONTINUED | OUTPATIENT
Start: 2018-07-15 | End: 2018-07-17 | Stop reason: HOSPADM

## 2018-07-15 RX ORDER — PRAZOSIN HYDROCHLORIDE 1 MG/1
2 CAPSULE ORAL 3 TIMES DAILY
Status: DISCONTINUED | OUTPATIENT
Start: 2018-07-15 | End: 2018-07-17 | Stop reason: HOSPADM

## 2018-07-15 RX ORDER — FLUTICASONE PROPIONATE AND SALMETEROL 500; 50 UG/1; UG/1
1 POWDER RESPIRATORY (INHALATION) 2 TIMES DAILY
Status: DISCONTINUED | OUTPATIENT
Start: 2018-07-15 | End: 2018-07-17 | Stop reason: HOSPADM

## 2018-07-15 RX ORDER — FAMOTIDINE 20 MG/1
20 TABLET, FILM COATED ORAL DAILY
Status: DISCONTINUED | OUTPATIENT
Start: 2018-07-16 | End: 2018-07-17 | Stop reason: HOSPADM

## 2018-07-15 RX ORDER — FLUCONAZOLE 100 MG/1
100 TABLET ORAL DAILY
Status: DISCONTINUED | OUTPATIENT
Start: 2018-07-15 | End: 2018-07-17 | Stop reason: HOSPADM

## 2018-07-15 RX ADMIN — CLONAZEPAM 1 MG: 1 TABLET ORAL at 21:36

## 2018-07-15 RX ADMIN — PRAZOSIN HYDROCHLORIDE 2 MG: 1 CAPSULE ORAL at 21:30

## 2018-07-15 RX ADMIN — LEVOTHYROXINE SODIUM 50 MCG: 50 TABLET ORAL at 08:14

## 2018-07-15 RX ADMIN — LORATADINE 10 MG: 10 TABLET ORAL at 12:08

## 2018-07-15 RX ADMIN — METOPROLOL TARTRATE 25 MG: 25 TABLET ORAL at 21:30

## 2018-07-15 RX ADMIN — BUPROPION HYDROCHLORIDE 300 MG: 150 TABLET, FILM COATED, EXTENDED RELEASE ORAL at 08:14

## 2018-07-15 RX ADMIN — FAMOTIDINE 20 MG: 20 TABLET ORAL at 08:14

## 2018-07-15 RX ADMIN — ISOSORBIDE DINITRATE 10 MG: 10 TABLET ORAL at 10:50

## 2018-07-15 RX ADMIN — ISOSORBIDE DINITRATE 10 MG: 10 TABLET ORAL at 16:05

## 2018-07-15 RX ADMIN — SODIUM CHLORIDE, PRESERVATIVE FREE 2 ML: 5 INJECTION INTRAVENOUS at 21:35

## 2018-07-15 RX ADMIN — METOPROLOL TARTRATE 25 MG: 25 TABLET ORAL at 08:15

## 2018-07-15 RX ADMIN — ACETAMINOPHEN AND CODEINE PHOSPHATE 2 TABLET: 300; 30 TABLET ORAL at 08:25

## 2018-07-15 RX ADMIN — ISOSORBIDE DINITRATE 10 MG: 10 TABLET ORAL at 06:37

## 2018-07-15 RX ADMIN — TRAMADOL HYDROCHLORIDE 50 MG: 50 TABLET, COATED ORAL at 19:34

## 2018-07-15 RX ADMIN — FUROSEMIDE 60 MG: 10 INJECTION, SOLUTION INTRAVENOUS at 08:14

## 2018-07-15 RX ADMIN — PRAZOSIN HYDROCHLORIDE 2 MG: 1 CAPSULE ORAL at 13:43

## 2018-07-15 RX ADMIN — DULOXETINE 60 MG: 60 CAPSULE, DELAYED RELEASE ORAL at 21:30

## 2018-07-15 RX ADMIN — TIZANIDINE 4 MG: 4 TABLET ORAL at 21:36

## 2018-07-15 RX ADMIN — FLUCONAZOLE 100 MG: 100 TABLET ORAL at 12:08

## 2018-07-15 RX ADMIN — TRAMADOL HYDROCHLORIDE 50 MG: 50 TABLET, COATED ORAL at 10:47

## 2018-07-15 RX ADMIN — TRAMADOL HYDROCHLORIDE 50 MG: 50 TABLET, COATED ORAL at 00:43

## 2018-07-15 RX ADMIN — GABAPENTIN 200 MG: 100 CAPSULE ORAL at 08:14

## 2018-07-15 RX ADMIN — ACETAMINOPHEN AND CODEINE PHOSPHATE 2 TABLET: 300; 30 TABLET ORAL at 21:36

## 2018-07-15 RX ADMIN — FLUTICASONE PROPIONATE AND SALMETEROL 1 PUFF: 50; 500 POWDER RESPIRATORY (INHALATION) at 17:28

## 2018-07-15 RX ADMIN — FERROUS SULFATE TAB 325 MG (65 MG ELEMENTAL FE) 325 MG: 325 (65 FE) TAB at 16:06

## 2018-07-15 RX ADMIN — HYDROXYZINE HYDROCHLORIDE 50 MG: 25 TABLET, FILM COATED ORAL at 21:29

## 2018-07-15 RX ADMIN — POTASSIUM CHLORIDE 20 MEQ: 1500 TABLET, EXTENDED RELEASE ORAL at 12:08

## 2018-07-15 RX ADMIN — GABAPENTIN 200 MG: 100 CAPSULE ORAL at 13:43

## 2018-07-15 RX ADMIN — DULOXETINE 60 MG: 60 CAPSULE, DELAYED RELEASE ORAL at 08:14

## 2018-07-15 RX ADMIN — MEROPENEM 500 MG: 500 INJECTION, POWDER, FOR SOLUTION INTRAVENOUS at 05:22

## 2018-07-15 RX ADMIN — FUROSEMIDE 60 MG: 10 INJECTION, SOLUTION INTRAVENOUS at 17:23

## 2018-07-15 RX ADMIN — SODIUM CHLORIDE, PRESERVATIVE FREE 2 ML: 5 INJECTION INTRAVENOUS at 08:16

## 2018-07-15 RX ADMIN — PANTOPRAZOLE SODIUM 40 MG: 40 TABLET, DELAYED RELEASE ORAL at 06:37

## 2018-07-15 RX ADMIN — ACETAMINOPHEN AND CODEINE PHOSPHATE 2 TABLET: 300; 30 TABLET ORAL at 14:29

## 2018-07-15 RX ADMIN — ENOXAPARIN SODIUM 40 MG: 100 INJECTION SUBCUTANEOUS at 08:15

## 2018-07-15 RX ADMIN — MEROPENEM 500 MG: 500 INJECTION, POWDER, FOR SOLUTION INTRAVENOUS at 21:28

## 2018-07-15 RX ADMIN — MEROPENEM 500 MG: 500 INJECTION, POWDER, FOR SOLUTION INTRAVENOUS at 13:43

## 2018-07-15 RX ADMIN — GABAPENTIN 300 MG: 300 CAPSULE ORAL at 21:33

## 2018-07-15 RX ADMIN — FERROUS SULFATE TAB 325 MG (65 MG ELEMENTAL FE) 325 MG: 325 (65 FE) TAB at 12:08

## 2018-07-15 ASSESSMENT — PAIN SCALES - GENERAL
PAIN_LEVEL_AT_REST: 0
PAIN_LEVEL_AT_REST: 2
PAIN_LEVEL_AT_REST: 6
PAIN_LEVEL_AT_REST: 3
PAIN_LEVEL_AT_REST: 6
PAIN_LEVEL_AT_REST: 5
PAIN_LEVEL_AT_REST: 5
PAIN_LEVEL_AT_REST: 4
PAIN_LEVEL_WITH_ACTIVITY: 4
PAIN_LEVEL_AT_REST: 4
PAIN_LEVEL_AT_REST: 6

## 2018-07-16 ENCOUNTER — TELEPHONE (OUTPATIENT)
Dept: ALLERGY | Age: 63
End: 2018-07-16

## 2018-07-16 LAB
GLUCOSE BLDC GLUCOMTR-MCNC: 111 MG/DL (ref 65–99)
GLUCOSE BLDC GLUCOMTR-MCNC: 130 MG/DL (ref 65–99)
GLUCOSE BLDC GLUCOMTR-MCNC: 138 MG/DL (ref 65–99)
GLUCOSE BLDC GLUCOMTR-MCNC: 138 MG/DL (ref 65–99)
GLUCOSE BLDC GLUCOMTR-MCNC: 178 MG/DL (ref 65–99)
GLUCOSE BLDC GLUCOMTR-MCNC: 70 MG/DL (ref 65–99)

## 2018-07-16 PROCEDURE — 10002800 HB RX 250 W HCPCS: Performed by: HOSPITALIST

## 2018-07-16 PROCEDURE — 99232 SBSQ HOSP IP/OBS MODERATE 35: CPT | Performed by: HOSPITALIST

## 2018-07-16 PROCEDURE — 10002803 HB RX 637: Performed by: HOSPITALIST

## 2018-07-16 PROCEDURE — 10004651 HB RX, NO CHARGE ITEM: Performed by: HOSPITALIST

## 2018-07-16 PROCEDURE — 10000002 HB ROOM CHARGE MED SURG

## 2018-07-16 PROCEDURE — 82962 GLUCOSE BLOOD TEST: CPT

## 2018-07-16 PROCEDURE — 10002807 HB RX 258: Performed by: HOSPITALIST

## 2018-07-16 RX ORDER — LEVOFLOXACIN 5 MG/ML
500 INJECTION, SOLUTION INTRAVENOUS ONCE
Status: COMPLETED | OUTPATIENT
Start: 2018-07-16 | End: 2018-07-16

## 2018-07-16 RX ORDER — FUROSEMIDE 40 MG/1
40 TABLET ORAL 2 TIMES DAILY
Status: DISCONTINUED | OUTPATIENT
Start: 2018-07-16 | End: 2018-07-17 | Stop reason: HOSPADM

## 2018-07-16 RX ORDER — FLUTICASONE PROPIONATE 50 MCG
2 SPRAY, SUSPENSION (ML) NASAL DAILY
Status: DISCONTINUED | OUTPATIENT
Start: 2018-07-16 | End: 2018-07-17 | Stop reason: HOSPADM

## 2018-07-16 RX ORDER — BUSPIRONE HYDROCHLORIDE 15 MG/1
30 TABLET ORAL 2 TIMES DAILY
Status: DISCONTINUED | OUTPATIENT
Start: 2018-07-16 | End: 2018-07-17 | Stop reason: HOSPADM

## 2018-07-16 RX ORDER — CALCIUM CARBONATE 500 MG/1
500 TABLET, CHEWABLE ORAL
Status: DISCONTINUED | OUTPATIENT
Start: 2018-07-16 | End: 2018-07-17 | Stop reason: HOSPADM

## 2018-07-16 RX ORDER — LEVOFLOXACIN 5 MG/ML
250 INJECTION, SOLUTION INTRAVENOUS DAILY
Status: DISCONTINUED | OUTPATIENT
Start: 2018-07-17 | End: 2018-07-17 | Stop reason: HOSPADM

## 2018-07-16 RX ADMIN — CALCIUM CARBONATE (ANTACID) CHEW TAB 500 MG 500 MG: 500 CHEW TAB at 18:00

## 2018-07-16 RX ADMIN — FLUTICASONE PROPIONATE 2 SPRAY: 50 SPRAY, METERED NASAL at 12:41

## 2018-07-16 RX ADMIN — BUSPIRONE HYDROCHLORIDE 30 MG: 15 TABLET ORAL at 18:00

## 2018-07-16 RX ADMIN — GABAPENTIN 300 MG: 300 CAPSULE ORAL at 20:36

## 2018-07-16 RX ADMIN — ISOSORBIDE DINITRATE 10 MG: 10 TABLET ORAL at 12:41

## 2018-07-16 RX ADMIN — PRAZOSIN HYDROCHLORIDE 2 MG: 1 CAPSULE ORAL at 20:35

## 2018-07-16 RX ADMIN — TRAMADOL HYDROCHLORIDE 50 MG: 50 TABLET, COATED ORAL at 12:40

## 2018-07-16 RX ADMIN — CLONAZEPAM 1 MG: 1 TABLET ORAL at 20:36

## 2018-07-16 RX ADMIN — SODIUM CHLORIDE, PRESERVATIVE FREE 2 ML: 5 INJECTION INTRAVENOUS at 20:37

## 2018-07-16 RX ADMIN — GABAPENTIN 200 MG: 100 CAPSULE ORAL at 10:46

## 2018-07-16 RX ADMIN — ENOXAPARIN SODIUM 40 MG: 100 INJECTION SUBCUTANEOUS at 10:50

## 2018-07-16 RX ADMIN — FUROSEMIDE 40 MG: 40 TABLET ORAL at 16:02

## 2018-07-16 RX ADMIN — HYDROXYZINE HYDROCHLORIDE 50 MG: 25 TABLET, FILM COATED ORAL at 20:35

## 2018-07-16 RX ADMIN — FERROUS SULFATE TAB 325 MG (65 MG ELEMENTAL FE) 325 MG: 325 (65 FE) TAB at 10:50

## 2018-07-16 RX ADMIN — DULOXETINE 60 MG: 60 CAPSULE, DELAYED RELEASE ORAL at 10:45

## 2018-07-16 RX ADMIN — TIOTROPIUM BROMIDE 18 MCG: 18 CAPSULE ORAL; RESPIRATORY (INHALATION) at 08:28

## 2018-07-16 RX ADMIN — POTASSIUM CHLORIDE 20 MEQ: 1500 TABLET, EXTENDED RELEASE ORAL at 10:48

## 2018-07-16 RX ADMIN — FERROUS SULFATE TAB 325 MG (65 MG ELEMENTAL FE) 325 MG: 325 (65 FE) TAB at 18:00

## 2018-07-16 RX ADMIN — LEVOFLOXACIN 500 MG: 5 INJECTION, SOLUTION INTRAVENOUS at 16:04

## 2018-07-16 RX ADMIN — FAMOTIDINE 20 MG: 20 TABLET ORAL at 10:47

## 2018-07-16 RX ADMIN — DULOXETINE 60 MG: 60 CAPSULE, DELAYED RELEASE ORAL at 20:36

## 2018-07-16 RX ADMIN — BUSPIRONE HYDROCHLORIDE 30 MG: 15 TABLET ORAL at 10:48

## 2018-07-16 RX ADMIN — METOPROLOL TARTRATE 25 MG: 25 TABLET ORAL at 10:48

## 2018-07-16 RX ADMIN — PRAZOSIN HYDROCHLORIDE 2 MG: 1 CAPSULE ORAL at 16:02

## 2018-07-16 RX ADMIN — FUROSEMIDE 40 MG: 40 TABLET ORAL at 10:50

## 2018-07-16 RX ADMIN — METOPROLOL TARTRATE 25 MG: 25 TABLET ORAL at 20:36

## 2018-07-16 RX ADMIN — PRAZOSIN HYDROCHLORIDE 2 MG: 1 CAPSULE ORAL at 10:45

## 2018-07-16 RX ADMIN — ISOSORBIDE DINITRATE 10 MG: 10 TABLET ORAL at 18:00

## 2018-07-16 RX ADMIN — FLUTICASONE PROPIONATE AND SALMETEROL 1 PUFF: 50; 500 POWDER RESPIRATORY (INHALATION) at 08:29

## 2018-07-16 RX ADMIN — BUPROPION HYDROCHLORIDE 300 MG: 150 TABLET, FILM COATED, EXTENDED RELEASE ORAL at 10:45

## 2018-07-16 RX ADMIN — LEVOTHYROXINE SODIUM 50 MCG: 50 TABLET ORAL at 10:47

## 2018-07-16 RX ADMIN — SODIUM CHLORIDE, PRESERVATIVE FREE 2 ML: 5 INJECTION INTRAVENOUS at 09:00

## 2018-07-16 RX ADMIN — ISOSORBIDE DINITRATE 10 MG: 10 TABLET ORAL at 06:21

## 2018-07-16 RX ADMIN — CALCIUM CARBONATE (ANTACID) CHEW TAB 500 MG 500 MG: 500 CHEW TAB at 12:41

## 2018-07-16 RX ADMIN — PANTOPRAZOLE SODIUM 40 MG: 40 TABLET, DELAYED RELEASE ORAL at 06:21

## 2018-07-16 RX ADMIN — TRAMADOL HYDROCHLORIDE 50 MG: 50 TABLET, COATED ORAL at 02:09

## 2018-07-16 RX ADMIN — TIZANIDINE 4 MG: 4 TABLET ORAL at 20:36

## 2018-07-16 RX ADMIN — MEROPENEM 500 MG: 500 INJECTION, POWDER, FOR SOLUTION INTRAVENOUS at 06:21

## 2018-07-16 RX ADMIN — FLUCONAZOLE 100 MG: 100 TABLET ORAL at 10:45

## 2018-07-16 RX ADMIN — LORATADINE 10 MG: 10 TABLET ORAL at 10:45

## 2018-07-16 RX ADMIN — INSULIN LISPRO 2 UNITS: 100 INJECTION, SOLUTION INTRAVENOUS; SUBCUTANEOUS at 13:00

## 2018-07-16 RX ADMIN — GABAPENTIN 200 MG: 100 CAPSULE ORAL at 16:02

## 2018-07-16 RX ADMIN — FLUTICASONE PROPIONATE AND SALMETEROL 1 PUFF: 50; 500 POWDER RESPIRATORY (INHALATION) at 20:18

## 2018-07-16 ASSESSMENT — PAIN SCALES - GENERAL
PAIN_LEVEL_AT_REST: 5
PAIN_LEVEL_AT_REST: 2
PAIN_LEVEL_AT_REST: 3
PAIN_LEVEL_AT_REST: 2
PAIN_LEVEL_AT_REST: 1
PAIN_LEVEL_AT_REST: 4

## 2018-07-17 VITALS
OXYGEN SATURATION: 97 % | HEIGHT: 65 IN | WEIGHT: 261.6 LBS | HEART RATE: 67 BPM | DIASTOLIC BLOOD PRESSURE: 74 MMHG | RESPIRATION RATE: 18 BRPM | SYSTOLIC BLOOD PRESSURE: 133 MMHG | BODY MASS INDEX: 43.58 KG/M2 | TEMPERATURE: 98.3 F

## 2018-07-17 LAB
GLUCOSE BLDC GLUCOMTR-MCNC: 129 MG/DL (ref 65–99)
GLUCOSE BLDC GLUCOMTR-MCNC: 91 MG/DL (ref 65–99)

## 2018-07-17 PROCEDURE — 10002800 HB RX 250 W HCPCS: Performed by: HOSPITALIST

## 2018-07-17 PROCEDURE — 10002803 HB RX 637: Performed by: HOSPITALIST

## 2018-07-17 PROCEDURE — 99238 HOSP IP/OBS DSCHRG MGMT 30/<: CPT | Performed by: HOSPITALIST

## 2018-07-17 PROCEDURE — 10004651 HB RX, NO CHARGE ITEM: Performed by: HOSPITALIST

## 2018-07-17 PROCEDURE — 94640 AIRWAY INHALATION TREATMENT: CPT

## 2018-07-17 PROCEDURE — 82962 GLUCOSE BLOOD TEST: CPT

## 2018-07-17 RX ORDER — LEVOFLOXACIN 500 MG/1
500 TABLET, FILM COATED ORAL DAILY
Qty: 5 TABLET | Refills: 0 | Status: SHIPPED | OUTPATIENT
Start: 2018-07-17 | End: 2018-07-22

## 2018-07-17 RX ADMIN — TIOTROPIUM BROMIDE 18 MCG: 18 CAPSULE ORAL; RESPIRATORY (INHALATION) at 07:57

## 2018-07-17 RX ADMIN — METOPROLOL TARTRATE 25 MG: 25 TABLET ORAL at 08:59

## 2018-07-17 RX ADMIN — PRAZOSIN HYDROCHLORIDE 2 MG: 1 CAPSULE ORAL at 08:58

## 2018-07-17 RX ADMIN — SODIUM CHLORIDE, PRESERVATIVE FREE 2 ML: 5 INJECTION INTRAVENOUS at 09:00

## 2018-07-17 RX ADMIN — CALCIUM CARBONATE (ANTACID) CHEW TAB 500 MG 500 MG: 500 CHEW TAB at 11:35

## 2018-07-17 RX ADMIN — ACETAMINOPHEN AND CODEINE PHOSPHATE 2 TABLET: 300; 30 TABLET ORAL at 13:40

## 2018-07-17 RX ADMIN — FUROSEMIDE 40 MG: 40 TABLET ORAL at 13:40

## 2018-07-17 RX ADMIN — FUROSEMIDE 40 MG: 40 TABLET ORAL at 08:59

## 2018-07-17 RX ADMIN — FLUTICASONE PROPIONATE 2 SPRAY: 50 SPRAY, METERED NASAL at 11:36

## 2018-07-17 RX ADMIN — LORATADINE 10 MG: 10 TABLET ORAL at 08:59

## 2018-07-17 RX ADMIN — FAMOTIDINE 20 MG: 20 TABLET ORAL at 08:59

## 2018-07-17 RX ADMIN — DULOXETINE 60 MG: 60 CAPSULE, DELAYED RELEASE ORAL at 08:59

## 2018-07-17 RX ADMIN — ISOSORBIDE DINITRATE 10 MG: 10 TABLET ORAL at 06:19

## 2018-07-17 RX ADMIN — CALCIUM CARBONATE (ANTACID) CHEW TAB 500 MG 500 MG: 500 CHEW TAB at 08:58

## 2018-07-17 RX ADMIN — ISOSORBIDE DINITRATE 10 MG: 10 TABLET ORAL at 11:34

## 2018-07-17 RX ADMIN — FLUCONAZOLE 100 MG: 100 TABLET ORAL at 08:58

## 2018-07-17 RX ADMIN — GABAPENTIN 200 MG: 100 CAPSULE ORAL at 13:40

## 2018-07-17 RX ADMIN — PANTOPRAZOLE SODIUM 40 MG: 40 TABLET, DELAYED RELEASE ORAL at 06:19

## 2018-07-17 RX ADMIN — GABAPENTIN 200 MG: 100 CAPSULE ORAL at 08:58

## 2018-07-17 RX ADMIN — SODIUM CHLORIDE, PRESERVATIVE FREE 500 UNITS: 5 INJECTION INTRAVENOUS at 14:14

## 2018-07-17 RX ADMIN — BUSPIRONE HYDROCHLORIDE 30 MG: 15 TABLET ORAL at 08:59

## 2018-07-17 RX ADMIN — POTASSIUM CHLORIDE 20 MEQ: 1500 TABLET, EXTENDED RELEASE ORAL at 09:00

## 2018-07-17 RX ADMIN — PRAZOSIN HYDROCHLORIDE 2 MG: 1 CAPSULE ORAL at 13:40

## 2018-07-17 RX ADMIN — LEVOTHYROXINE SODIUM 50 MCG: 50 TABLET ORAL at 08:59

## 2018-07-17 RX ADMIN — LEVOFLOXACIN 250 MG: 5 INJECTION, SOLUTION INTRAVENOUS at 08:39

## 2018-07-17 RX ADMIN — FERROUS SULFATE TAB 325 MG (65 MG ELEMENTAL FE) 325 MG: 325 (65 FE) TAB at 08:58

## 2018-07-17 RX ADMIN — ENOXAPARIN SODIUM 40 MG: 100 INJECTION SUBCUTANEOUS at 08:57

## 2018-07-17 RX ADMIN — BUPROPION HYDROCHLORIDE 300 MG: 150 TABLET, FILM COATED, EXTENDED RELEASE ORAL at 08:59

## 2018-07-17 RX ADMIN — FLUTICASONE PROPIONATE AND SALMETEROL 1 PUFF: 50; 500 POWDER RESPIRATORY (INHALATION) at 07:56

## 2018-07-17 ASSESSMENT — PAIN SCALES - GENERAL
PAIN_LEVEL_AT_REST: 3
PAIN_LEVEL_AT_REST: 6

## 2018-07-18 ENCOUNTER — E-ADVICE (OUTPATIENT)
Dept: NEUROLOGY | Age: 63
End: 2018-07-18

## 2018-07-18 ENCOUNTER — TELEPHONE (OUTPATIENT)
Dept: INTERNAL MEDICINE | Age: 63
End: 2018-07-18

## 2018-07-18 ENCOUNTER — TELEPHONE (OUTPATIENT)
Dept: ALLERGY | Age: 63
End: 2018-07-18

## 2018-07-18 ENCOUNTER — OFFICE VISIT (OUTPATIENT)
Dept: ALLERGY | Age: 63
End: 2018-07-18

## 2018-07-18 VITALS
HEART RATE: 77 BPM | TEMPERATURE: 97.6 F | HEIGHT: 65 IN | RESPIRATION RATE: 20 BRPM | DIASTOLIC BLOOD PRESSURE: 80 MMHG | BODY MASS INDEX: 43.78 KG/M2 | OXYGEN SATURATION: 96 % | SYSTOLIC BLOOD PRESSURE: 112 MMHG | WEIGHT: 262.79 LBS

## 2018-07-18 DIAGNOSIS — E87.6 HYPOKALEMIA: Primary | ICD-10-CM

## 2018-07-18 DIAGNOSIS — D80.1 HYPOGAMMAGLOBULINEMIA (CMD): Primary | ICD-10-CM

## 2018-07-18 PROCEDURE — 99213 OFFICE O/P EST LOW 20 MIN: CPT | Performed by: ALLERGY & IMMUNOLOGY

## 2018-07-19 ENCOUNTER — OFFICE VISIT (OUTPATIENT)
Dept: INTERNAL MEDICINE | Age: 63
End: 2018-07-19

## 2018-07-19 ENCOUNTER — TELEPHONE (OUTPATIENT)
Dept: INTERNAL MEDICINE | Age: 63
End: 2018-07-19

## 2018-07-19 VITALS
DIASTOLIC BLOOD PRESSURE: 70 MMHG | HEART RATE: 64 BPM | BODY MASS INDEX: 44.26 KG/M2 | WEIGHT: 266 LBS | SYSTOLIC BLOOD PRESSURE: 116 MMHG

## 2018-07-19 DIAGNOSIS — E03.9 HYPOTHYROIDISM, UNSPECIFIED TYPE: ICD-10-CM

## 2018-07-19 DIAGNOSIS — J45.909 INTRINSIC ASTHMA: ICD-10-CM

## 2018-07-19 DIAGNOSIS — I47.11 INAPPROPRIATE SINUS TACHYCARDIA: ICD-10-CM

## 2018-07-19 DIAGNOSIS — I20.1 PRINZMETAL ANGINA (CMD): ICD-10-CM

## 2018-07-19 DIAGNOSIS — I10 ESSENTIAL HYPERTENSION WITH GOAL BLOOD PRESSURE LESS THAN 140/90: ICD-10-CM

## 2018-07-19 DIAGNOSIS — R60.1 GENERALIZED EDEMA: Primary | ICD-10-CM

## 2018-07-19 PROCEDURE — 99214 OFFICE O/P EST MOD 30 MIN: CPT | Performed by: INTERNAL MEDICINE

## 2018-07-19 RX ORDER — PRAZOSIN HYDROCHLORIDE 2 MG/1
2 CAPSULE ORAL NIGHTLY
Qty: 90 CAPSULE | Refills: 3 | Status: SHIPPED | COMMUNITY
Start: 2018-07-19 | End: 2019-07-16 | Stop reason: SDUPTHER

## 2018-07-19 RX ORDER — METOLAZONE 2.5 MG/1
TABLET ORAL
Qty: 15 TABLET | Refills: 0 | Status: ON HOLD | OUTPATIENT
Start: 2018-07-19 | End: 2018-10-27 | Stop reason: ALTCHOICE

## 2018-07-19 RX ORDER — FLUCONAZOLE 100 MG/1
100 TABLET ORAL DAILY
Qty: 7 TABLET | Refills: 0 | Status: SHIPPED | OUTPATIENT
Start: 2018-07-19 | End: 2018-09-27 | Stop reason: ALTCHOICE

## 2018-07-19 RX ORDER — FUROSEMIDE 40 MG/1
TABLET ORAL
Qty: 90 TABLET | Refills: 3 | Status: SHIPPED | COMMUNITY
Start: 2018-07-19 | End: 2018-10-08

## 2018-07-19 RX ORDER — FUROSEMIDE 40 MG/1
TABLET ORAL
Qty: 270 TABLET | Refills: 3 | Status: SHIPPED | OUTPATIENT
Start: 2018-07-19 | End: 2018-10-08

## 2018-07-19 RX ORDER — POTASSIUM CHLORIDE 750 MG/1
20 TABLET, EXTENDED RELEASE ORAL DAILY
Qty: 180 TABLET | Refills: 3 | Status: SHIPPED | OUTPATIENT
Start: 2018-07-19 | End: 2018-09-27 | Stop reason: ALTCHOICE

## 2018-07-20 ENCOUNTER — TELEPHONE (OUTPATIENT)
Dept: INTERNAL MEDICINE | Age: 63
End: 2018-07-20

## 2018-07-23 ENCOUNTER — E-ADVICE (OUTPATIENT)
Dept: INTERNAL MEDICINE | Age: 63
End: 2018-07-23

## 2018-07-24 ENCOUNTER — TELEPHONE (OUTPATIENT)
Dept: INTERNAL MEDICINE | Age: 63
End: 2018-07-24

## 2018-07-24 ENCOUNTER — OFFICE VISIT (OUTPATIENT)
Dept: CARDIOLOGY | Age: 63
End: 2018-07-24
Attending: INTERNAL MEDICINE

## 2018-07-24 ENCOUNTER — LAB SERVICES (OUTPATIENT)
Dept: LAB | Age: 63
End: 2018-07-24

## 2018-07-24 ENCOUNTER — OFFICE VISIT (OUTPATIENT)
Dept: INTERNAL MEDICINE | Age: 63
End: 2018-07-24

## 2018-07-24 VITALS
DIASTOLIC BLOOD PRESSURE: 72 MMHG | HEIGHT: 65 IN | SYSTOLIC BLOOD PRESSURE: 128 MMHG | HEART RATE: 76 BPM | WEIGHT: 261.4 LBS | BODY MASS INDEX: 43.55 KG/M2

## 2018-07-24 DIAGNOSIS — Z01.810 PRE-OPERATIVE CARDIOVASCULAR EXAMINATION: ICD-10-CM

## 2018-07-24 DIAGNOSIS — I20.1 PRINZMETAL ANGINA (CMD): ICD-10-CM

## 2018-07-24 DIAGNOSIS — I10 ESSENTIAL HYPERTENSION WITH GOAL BLOOD PRESSURE LESS THAN 140/90: ICD-10-CM

## 2018-07-24 DIAGNOSIS — M17.12 PRIMARY OSTEOARTHRITIS OF LEFT KNEE: ICD-10-CM

## 2018-07-24 DIAGNOSIS — I47.11 INAPPROPRIATE SINUS TACHYCARDIA: ICD-10-CM

## 2018-07-24 DIAGNOSIS — E11.9 TYPE 2 DIABETES MELLITUS WITHOUT COMPLICATION, WITHOUT LONG-TERM CURRENT USE OF INSULIN (CMD): ICD-10-CM

## 2018-07-24 DIAGNOSIS — Z01.810 PRE-OPERATIVE CARDIOVASCULAR EXAMINATION: Primary | ICD-10-CM

## 2018-07-24 LAB
ANION GAP SERPL CALC-SCNC: 8 MMOL/L (ref 10–20)
BUN SERPL-MCNC: 26 MG/DL (ref 6–20)
BUN/CREAT SERPL: 15 (ref 7–25)
CALCIUM SERPL-MCNC: 9.2 MG/DL (ref 8.4–10.2)
CHLORIDE SERPL-SCNC: 84 MMOL/L (ref 98–107)
CO2 SERPL-SCNC: 34 MMOL/L (ref 21–32)
CREAT SERPL-MCNC: 1.68 MG/DL (ref 0.51–0.95)
FASTING STATUS PATIENT QL REPORTED: 10 HRS
GLUCOSE SERPL-MCNC: 160 MG/DL (ref 65–99)
POTASSIUM SERPL-SCNC: 3.4 MMOL/L (ref 3.4–5.1)
SODIUM SERPL-SCNC: 123 MMOL/L (ref 135–145)

## 2018-07-24 PROCEDURE — 93000 ELECTROCARDIOGRAM COMPLETE: CPT | Performed by: INTERNAL MEDICINE

## 2018-07-24 PROCEDURE — 36415 COLL VENOUS BLD VENIPUNCTURE: CPT | Performed by: INTERNAL MEDICINE

## 2018-07-24 PROCEDURE — 99242 OFF/OP CONSLTJ NEW/EST SF 20: CPT | Performed by: INTERNAL MEDICINE

## 2018-07-24 PROCEDURE — 80048 BASIC METABOLIC PNL TOTAL CA: CPT | Performed by: INTERNAL MEDICINE

## 2018-07-25 ENCOUNTER — TELEPHONE (OUTPATIENT)
Dept: ALLERGY | Age: 63
End: 2018-07-25

## 2018-07-25 PROCEDURE — 10004348 HB COUNTER-EVAL PRE-OP

## 2018-07-25 ASSESSMENT — ACTIVITIES OF DAILY LIVING (ADL)
NEEDS_ASSIST: NO
ADL_BEFORE_ADMISSION: INDEPENDENT
ADL_SHORT_OF_BREATH: NO
ADL_SCORE: 12
RECENT_DECLINE_ADL: NO
HISTORY OF FALLING IN THE LAST YEAR (PRIOR TO ADMISSION): NO
SENSORY_SUPPORT_DEVICES: EYEGLASSES
CHRONIC_PAIN_PRESENT: NO

## 2018-07-26 ENCOUNTER — TELEPHONE (OUTPATIENT)
Dept: ORTHOPEDICS | Age: 63
End: 2018-07-26

## 2018-07-26 ENCOUNTER — OFFICE VISIT (OUTPATIENT)
Dept: ORTHOPEDICS | Age: 63
End: 2018-07-26

## 2018-07-26 VITALS
HEIGHT: 65 IN | TEMPERATURE: 98.2 F | SYSTOLIC BLOOD PRESSURE: 115 MMHG | HEART RATE: 87 BPM | DIASTOLIC BLOOD PRESSURE: 73 MMHG | WEIGHT: 261.25 LBS | BODY MASS INDEX: 43.53 KG/M2

## 2018-07-26 DIAGNOSIS — M17.12 ARTHRITIS OF LEFT KNEE: Primary | ICD-10-CM

## 2018-07-26 DIAGNOSIS — G89.29 CHRONIC PAIN OF LEFT KNEE: ICD-10-CM

## 2018-07-26 DIAGNOSIS — M25.562 CHRONIC PAIN OF LEFT KNEE: ICD-10-CM

## 2018-07-26 DIAGNOSIS — Z01.812 PRE-PROCEDURAL LABORATORY EXAMINATION: ICD-10-CM

## 2018-07-26 LAB
IGA SERPL-MCNC: 209 MG/DL (ref 87–352)
IGG SERPL-MCNC: 885 MG/DL (ref 700–1600)
IGM SERPL-MCNC: 34 MG/DL (ref 26–217)

## 2018-07-26 PROCEDURE — 99213 OFFICE O/P EST LOW 20 MIN: CPT | Performed by: PHYSICIAN ASSISTANT

## 2018-07-26 PROCEDURE — 87641 MR-STAPH DNA AMP PROBE: CPT | Performed by: INTERNAL MEDICINE

## 2018-07-26 PROCEDURE — 87640 STAPH A DNA AMP PROBE: CPT | Performed by: INTERNAL MEDICINE

## 2018-07-27 ENCOUNTER — LAB SERVICES (OUTPATIENT)
Dept: LAB | Age: 63
End: 2018-07-27

## 2018-07-27 DIAGNOSIS — E87.6 HYPOKALEMIA: ICD-10-CM

## 2018-07-27 LAB
ANION GAP SERPL CALC-SCNC: 11 MMOL/L (ref 10–20)
BUN SERPL-MCNC: 23 MG/DL (ref 6–20)
BUN/CREAT SERPL: 15 (ref 7–25)
CALCIUM SERPL-MCNC: 9 MG/DL (ref 8.4–10.2)
CHLORIDE SERPL-SCNC: 93 MMOL/L (ref 98–107)
CO2 SERPL-SCNC: 30 MMOL/L (ref 21–32)
CREAT SERPL-MCNC: 1.49 MG/DL (ref 0.51–0.95)
FASTING STATUS PATIENT QL REPORTED: 3.5 HRS
GLUCOSE SERPL-MCNC: 135 MG/DL (ref 65–99)
MRSA DNA SPEC QL NAA+PROBE: NOT DETECTED
POTASSIUM SERPL-SCNC: 4.1 MMOL/L (ref 3.4–5.1)
S AUREUS DNA SPEC QL NAA+PROBE: NOT DETECTED
SODIUM SERPL-SCNC: 130 MMOL/L (ref 135–145)
SPECIMEN SOURCE: NORMAL

## 2018-07-27 PROCEDURE — 36415 COLL VENOUS BLD VENIPUNCTURE: CPT | Performed by: INTERNAL MEDICINE

## 2018-07-27 PROCEDURE — 80048 BASIC METABOLIC PNL TOTAL CA: CPT | Performed by: INTERNAL MEDICINE

## 2018-07-29 ENCOUNTER — ANESTHESIA EVENT (OUTPATIENT)
Dept: ORTHOPEDICS | Age: 63
DRG: 470 | End: 2018-07-29

## 2018-07-30 ENCOUNTER — SURGERY (OUTPATIENT)
Age: 63
End: 2018-07-30

## 2018-07-30 ENCOUNTER — HOSPITAL ENCOUNTER (INPATIENT)
Age: 63
LOS: 1 days | Discharge: HOME HEALTH | DRG: 470 | End: 2018-07-31
Attending: ORTHOPAEDIC SURGERY | Admitting: ORTHOPAEDIC SURGERY

## 2018-07-30 ENCOUNTER — OFF PREMISE (OUTPATIENT)
Dept: ORTHOPEDICS | Age: 63
End: 2018-07-30

## 2018-07-30 ENCOUNTER — ANESTHESIA (OUTPATIENT)
Dept: ORTHOPEDICS | Age: 63
DRG: 470 | End: 2018-07-30

## 2018-07-30 DIAGNOSIS — Z96.652 HISTORY OF TOTAL LEFT KNEE REPLACEMENT: Primary | ICD-10-CM

## 2018-07-30 DIAGNOSIS — E66.01 MORBID OBESITY (CMD): ICD-10-CM

## 2018-07-30 DIAGNOSIS — J45.50 UNCONTROLLED SEVERE PERSISTENT ASTHMA: ICD-10-CM

## 2018-07-30 LAB — GLUCOSE BLDC GLUCOMTR-MCNC: 156 MG/DL (ref 65–99)

## 2018-07-30 PROCEDURE — C1776 JOINT DEVICE (IMPLANTABLE): HCPCS | Performed by: ORTHOPAEDIC SURGERY

## 2018-07-30 PROCEDURE — 10004173 HB COUNTER-THERAPY VISIT PT

## 2018-07-30 PROCEDURE — 10002807 HB RX 258: Performed by: ORTHOPAEDIC SURGERY

## 2018-07-30 PROCEDURE — 10002117 HB ADDITIONAL SURGERY TIME/30 MIN: Performed by: ORTHOPAEDIC SURGERY

## 2018-07-30 PROCEDURE — 99255 IP/OBS CONSLTJ NEW/EST HI 80: CPT | Performed by: HOSPITALIST

## 2018-07-30 PROCEDURE — 10003445 HB TELEMETRY PER DAY

## 2018-07-30 PROCEDURE — 64447 NJX AA&/STRD FEMORAL NRV IMG: CPT | Performed by: ANESTHESIOLOGY

## 2018-07-30 PROCEDURE — 10002801 HB RX 250 W/O HCPCS: Performed by: ORTHOPAEDIC SURGERY

## 2018-07-30 PROCEDURE — 94640 AIRWAY INHALATION TREATMENT: CPT

## 2018-07-30 PROCEDURE — 10002807 HB RX 258: Performed by: ANESTHESIOLOGY

## 2018-07-30 PROCEDURE — 10002801 HB RX 250 W/O HCPCS: Performed by: ANESTHESIOLOGY

## 2018-07-30 PROCEDURE — 10002800 HB RX 250 W HCPCS

## 2018-07-30 PROCEDURE — 10004348 HB COUNTER-EVAL PRE-OP

## 2018-07-30 PROCEDURE — 10002800 HB RX 250 W HCPCS: Performed by: PHYSICIAN ASSISTANT

## 2018-07-30 PROCEDURE — 10004316 HB COUNTER-PREP

## 2018-07-30 PROCEDURE — 27447 TOTAL KNEE ARTHROPLASTY: CPT | Performed by: PHYSICIAN ASSISTANT

## 2018-07-30 PROCEDURE — 10001449 HB FEMUR/TIBIA/FIBULA/PATELLA 2: Performed by: ORTHOPAEDIC SURGERY

## 2018-07-30 PROCEDURE — 10002358 HB ANESTH GENERAL 1ST 1/2 HR: Performed by: ORTHOPAEDIC SURGERY

## 2018-07-30 PROCEDURE — 10003056 HB DISPOSABLE INSTRUMENT/SUPPLY 1: Performed by: ORTHOPAEDIC SURGERY

## 2018-07-30 PROCEDURE — 10002807 HB RX 258: Performed by: PHYSICIAN ASSISTANT

## 2018-07-30 PROCEDURE — 10002800 HB RX 250 W HCPCS: Performed by: ANESTHESIOLOGY

## 2018-07-30 PROCEDURE — 10002803 HB RX 637: Performed by: ORTHOPAEDIC SURGERY

## 2018-07-30 PROCEDURE — 97161 PT EVAL LOW COMPLEX 20 MIN: CPT

## 2018-07-30 PROCEDURE — 27447 TOTAL KNEE ARTHROPLASTY: CPT | Performed by: ANESTHESIOLOGY

## 2018-07-30 PROCEDURE — 82962 GLUCOSE BLOOD TEST: CPT

## 2018-07-30 PROCEDURE — C9290 INJ, BUPIVACAINE LIPOSOME: HCPCS | Performed by: ORTHOPAEDIC SURGERY

## 2018-07-30 PROCEDURE — 10000002 HB ROOM CHARGE MED SURG

## 2018-07-30 PROCEDURE — 76942 ECHO GUIDE FOR BIOPSY: CPT | Performed by: ANESTHESIOLOGY

## 2018-07-30 PROCEDURE — 10002800 HB RX 250 W HCPCS: Performed by: ORTHOPAEDIC SURGERY

## 2018-07-30 PROCEDURE — 10002359 HB ANESTH GENERAL ADD'L 1/2 HR: Performed by: ORTHOPAEDIC SURGERY

## 2018-07-30 PROCEDURE — 10004451 HB PACU RECOVERY 1ST 30 MINUTES: Performed by: ORTHOPAEDIC SURGERY

## 2018-07-30 PROCEDURE — 97530 THERAPEUTIC ACTIVITIES: CPT

## 2018-07-30 PROCEDURE — 27447 TOTAL KNEE ARTHROPLASTY: CPT | Performed by: ORTHOPAEDIC SURGERY

## 2018-07-30 PROCEDURE — 10002801 HB RX 250 W/O HCPCS: Performed by: PHYSICIAN ASSISTANT

## 2018-07-30 PROCEDURE — 10002803 HB RX 637: Performed by: ANESTHESIOLOGY

## 2018-07-30 PROCEDURE — 10003132 HB IMPLANT, ORTHO/NEURO 1: Performed by: ORTHOPAEDIC SURGERY

## 2018-07-30 PROCEDURE — 10004452 HB PACU ADDL 30 MINUTES: Performed by: ORTHOPAEDIC SURGERY

## 2018-07-30 DEVICE — IMPLANTABLE DEVICE
Type: IMPLANTABLE DEVICE | Site: KNEE | Status: FUNCTIONAL
Brand: PERSONA®

## 2018-07-30 DEVICE — IMPLANTABLE DEVICE
Type: IMPLANTABLE DEVICE | Site: KNEE | Status: FUNCTIONAL
Brand: PERSONA® VIVACIT-E®

## 2018-07-30 DEVICE — IMPLANTABLE DEVICE
Type: IMPLANTABLE DEVICE | Site: KNEE | Status: FUNCTIONAL
Brand: PERSONA™

## 2018-07-30 DEVICE — MMIS - BASEPLATE TIB PERSONA NATURAL TIB 5D E KN LT STEM: Type: IMPLANTABLE DEVICE | Site: KNEE | Status: FUNCTIONAL

## 2018-07-30 DEVICE — SIMPLEX® HV WITH GENTAMICIN IS A FAST-SETTING ACRYLIC RESIN WITH ADDITION OF GENTAMICIN SULFATE FOR USE IN BONE SURGERY. MIXING THE TWO SEPARATE STERILE COMPONENTS PRODUCES A DUCTILE BONE CEMENT WHICH, AFTER HARDENING, FIXES THE IMPLANT AND TRANSFERS STRESSES PRODUCED DURING MOVEMENT EVENLY TO THE BONE. SIMPLEX® HV WITH GENTAMICINN CEMENT POWDER ALSO CONTAINS INSOLUBLE ZIRCONIUM DIOXIDE AS AN X-RAY CONTRAST MEDIUM. SIMPLEX® HV WITH GENTAMICIN DOES NOT EMIT A SIGNAL AND DOES NOT POSE A SAFETY RISK IN A MAGNETIC RESONANCE ENVIRONMENT.
Type: IMPLANTABLE DEVICE | Site: KNEE | Status: FUNCTIONAL
Brand: SIMPLEX HV WITH GENTAMICIN

## 2018-07-30 RX ORDER — 0.9 % SODIUM CHLORIDE 0.9 %
2 VIAL (ML) INJECTION EVERY 12 HOURS SCHEDULED
Status: DISCONTINUED | OUTPATIENT
Start: 2018-07-30 | End: 2018-07-30

## 2018-07-30 RX ORDER — EPINEPHRINE 0.3 MG/.3ML
0.3 INJECTION SUBCUTANEOUS PRN
Status: DISCONTINUED | OUTPATIENT
Start: 2018-07-30 | End: 2018-07-30 | Stop reason: CLARIF

## 2018-07-30 RX ORDER — FAMOTIDINE 20 MG/1
20 TABLET, FILM COATED ORAL
Status: COMPLETED | OUTPATIENT
Start: 2018-07-30 | End: 2018-07-30

## 2018-07-30 RX ORDER — MORPHINE SULFATE 15 MG/1
15 TABLET, FILM COATED, EXTENDED RELEASE ORAL EVERY 12 HOURS SCHEDULED
Status: DISCONTINUED | OUTPATIENT
Start: 2018-07-30 | End: 2018-07-31 | Stop reason: HOSPADM

## 2018-07-30 RX ORDER — BENZONATATE 100 MG/1
100 CAPSULE ORAL 3 TIMES DAILY PRN
Status: DISCONTINUED | OUTPATIENT
Start: 2018-07-30 | End: 2018-07-31 | Stop reason: HOSPADM

## 2018-07-30 RX ORDER — LORATADINE 10 MG/1
10 TABLET ORAL DAILY
Status: DISCONTINUED | OUTPATIENT
Start: 2018-07-31 | End: 2018-07-31 | Stop reason: HOSPADM

## 2018-07-30 RX ORDER — GABAPENTIN 100 MG/1
200 CAPSULE ORAL 2 TIMES DAILY
Status: DISCONTINUED | OUTPATIENT
Start: 2018-07-30 | End: 2018-07-31 | Stop reason: HOSPADM

## 2018-07-30 RX ORDER — DIAZEPAM 2 MG/1
2 TABLET ORAL EVERY 8 HOURS PRN
Status: DISCONTINUED | OUTPATIENT
Start: 2018-07-30 | End: 2018-07-31 | Stop reason: HOSPADM

## 2018-07-30 RX ORDER — POTASSIUM CITRATE 10 MEQ/1
1080 TABLET, EXTENDED RELEASE ORAL
Status: DISCONTINUED | OUTPATIENT
Start: 2018-07-30 | End: 2018-07-31 | Stop reason: HOSPADM

## 2018-07-30 RX ORDER — ROPIVACAINE HYDROCHLORIDE 5 MG/ML
INJECTION, SOLUTION EPIDURAL; INFILTRATION; PERINEURAL PRN
Status: DISCONTINUED | OUTPATIENT
Start: 2018-07-30 | End: 2018-07-30

## 2018-07-30 RX ORDER — WARFARIN SODIUM 5 MG/1
5 TABLET ORAL EVERY EVENING
Status: DISCONTINUED | OUTPATIENT
Start: 2018-07-31 | End: 2018-07-31 | Stop reason: HOSPADM

## 2018-07-30 RX ORDER — PROPOFOL 10 MG/ML
INJECTION, EMULSION INTRAVENOUS PRN
Status: DISCONTINUED | OUTPATIENT
Start: 2018-07-30 | End: 2018-07-30

## 2018-07-30 RX ORDER — CALCIUM CARBONATE 500 MG/1
500-1000 TABLET, CHEWABLE ORAL EVERY 4 HOURS PRN
Status: DISCONTINUED | OUTPATIENT
Start: 2018-07-30 | End: 2018-07-31 | Stop reason: HOSPADM

## 2018-07-30 RX ORDER — MUPIROCIN 20 MG/G
1 OINTMENT TOPICAL
Status: DISCONTINUED | OUTPATIENT
Start: 2018-07-30 | End: 2018-07-30

## 2018-07-30 RX ORDER — FAMOTIDINE 20 MG/1
20 TABLET, FILM COATED ORAL DAILY
Status: DISCONTINUED | OUTPATIENT
Start: 2018-07-31 | End: 2018-07-31 | Stop reason: HOSPADM

## 2018-07-30 RX ORDER — WARFARIN SODIUM 5 MG/1
5 TABLET ORAL EVERY EVENING
Status: COMPLETED | OUTPATIENT
Start: 2018-07-30 | End: 2018-07-30

## 2018-07-30 RX ORDER — IPRATROPIUM BROMIDE AND ALBUTEROL SULFATE 2.5; .5 MG/3ML; MG/3ML
3 SOLUTION RESPIRATORY (INHALATION)
Status: DISCONTINUED | OUTPATIENT
Start: 2018-07-30 | End: 2018-07-31 | Stop reason: HOSPADM

## 2018-07-30 RX ORDER — FUROSEMIDE 40 MG/1
80 TABLET ORAL DAILY
Status: DISCONTINUED | OUTPATIENT
Start: 2018-07-31 | End: 2018-07-31 | Stop reason: HOSPADM

## 2018-07-30 RX ORDER — ACETAMINOPHEN 325 MG/1
650 TABLET ORAL EVERY 4 HOURS PRN
Status: DISCONTINUED | OUTPATIENT
Start: 2018-07-30 | End: 2018-07-31 | Stop reason: HOSPADM

## 2018-07-30 RX ORDER — FUROSEMIDE 40 MG/1
40 TABLET ORAL
Status: DISCONTINUED | OUTPATIENT
Start: 2018-07-30 | End: 2018-07-30

## 2018-07-30 RX ORDER — DIPHENHYDRAMINE HCL 25 MG
25 CAPSULE ORAL EVERY 6 HOURS PRN
Status: DISCONTINUED | OUTPATIENT
Start: 2018-07-30 | End: 2018-07-31 | Stop reason: HOSPADM

## 2018-07-30 RX ORDER — HYDROCODONE BITARTRATE AND ACETAMINOPHEN 5; 325 MG/1; MG/1
1-2 TABLET ORAL EVERY 4 HOURS PRN
Status: DISCONTINUED | OUTPATIENT
Start: 2018-07-30 | End: 2018-07-31 | Stop reason: HOSPADM

## 2018-07-30 RX ORDER — PROCHLORPERAZINE MALEATE 5 MG/1
5 TABLET ORAL EVERY 4 HOURS PRN
Status: DISCONTINUED | OUTPATIENT
Start: 2018-07-30 | End: 2018-07-31 | Stop reason: HOSPADM

## 2018-07-30 RX ORDER — ONDANSETRON 2 MG/ML
INJECTION INTRAMUSCULAR; INTRAVENOUS PRN
Status: DISCONTINUED | OUTPATIENT
Start: 2018-07-30 | End: 2018-07-30

## 2018-07-30 RX ORDER — FUROSEMIDE 40 MG/1
40 TABLET ORAL DAILY
Status: DISCONTINUED | OUTPATIENT
Start: 2018-07-30 | End: 2018-07-30 | Stop reason: DRUGHIGH

## 2018-07-30 RX ORDER — MUPIROCIN 20 MG/G
1 OINTMENT TOPICAL EVERY 12 HOURS SCHEDULED
Status: DISCONTINUED | OUTPATIENT
Start: 2018-07-30 | End: 2018-07-31 | Stop reason: HOSPADM

## 2018-07-30 RX ORDER — BUPROPION HYDROCHLORIDE 150 MG/1
300 TABLET ORAL DAILY
Status: DISCONTINUED | OUTPATIENT
Start: 2018-07-31 | End: 2018-07-31 | Stop reason: HOSPADM

## 2018-07-30 RX ORDER — ALBUTEROL SULFATE 90 UG/1
2 AEROSOL, METERED RESPIRATORY (INHALATION)
Status: DISCONTINUED | OUTPATIENT
Start: 2018-07-30 | End: 2018-07-31 | Stop reason: HOSPADM

## 2018-07-30 RX ORDER — TIOTROPIUM BROMIDE 18 UG/1
18 CAPSULE ORAL; RESPIRATORY (INHALATION)
Status: DISCONTINUED | OUTPATIENT
Start: 2018-07-30 | End: 2018-07-31 | Stop reason: HOSPADM

## 2018-07-30 RX ORDER — PRAZOSIN HYDROCHLORIDE 1 MG/1
2 CAPSULE ORAL NIGHTLY
Status: DISCONTINUED | OUTPATIENT
Start: 2018-07-30 | End: 2018-07-31 | Stop reason: HOSPADM

## 2018-07-30 RX ORDER — DULOXETIN HYDROCHLORIDE 60 MG/1
60 CAPSULE, DELAYED RELEASE ORAL 2 TIMES DAILY
Status: DISCONTINUED | OUTPATIENT
Start: 2018-07-30 | End: 2018-07-31 | Stop reason: HOSPADM

## 2018-07-30 RX ORDER — CLOTRIMAZOLE 1 %
CREAM (GRAM) TOPICAL PRN
Status: DISCONTINUED | OUTPATIENT
Start: 2018-07-30 | End: 2018-07-31 | Stop reason: HOSPADM

## 2018-07-30 RX ORDER — CLONAZEPAM 0.5 MG/1
1 TABLET ORAL NIGHTLY PRN
Status: DISCONTINUED | OUTPATIENT
Start: 2018-07-30 | End: 2018-07-31 | Stop reason: HOSPADM

## 2018-07-30 RX ORDER — DEXAMETHASONE SODIUM PHOSPHATE 4 MG/ML
INJECTION, SOLUTION INTRA-ARTICULAR; INTRALESIONAL; INTRAMUSCULAR; INTRAVENOUS; SOFT TISSUE PRN
Status: DISCONTINUED | OUTPATIENT
Start: 2018-07-30 | End: 2018-07-30

## 2018-07-30 RX ORDER — BISACODYL 10 MG
10 SUPPOSITORY, RECTAL RECTAL DAILY PRN
Status: DISCONTINUED | OUTPATIENT
Start: 2018-07-30 | End: 2018-07-31 | Stop reason: HOSPADM

## 2018-07-30 RX ORDER — ACETAMINOPHEN 10 MG/ML
INJECTION, SOLUTION INTRAVENOUS PRN
Status: DISCONTINUED | OUTPATIENT
Start: 2018-07-30 | End: 2018-07-30

## 2018-07-30 RX ORDER — NITROGLYCERIN 0.4 MG/1
0.4 TABLET SUBLINGUAL EVERY 5 MIN PRN
Status: DISCONTINUED | OUTPATIENT
Start: 2018-07-30 | End: 2018-07-31 | Stop reason: HOSPADM

## 2018-07-30 RX ORDER — SODIUM CHLORIDE 9 MG/ML
INJECTION, SOLUTION INTRAVENOUS CONTINUOUS
Status: DISCONTINUED | OUTPATIENT
Start: 2018-07-30 | End: 2018-07-30

## 2018-07-30 RX ORDER — METOLAZONE 2.5 MG/1
2.5 TABLET ORAL DAILY
Status: DISCONTINUED | OUTPATIENT
Start: 2018-07-30 | End: 2018-07-30

## 2018-07-30 RX ORDER — POTASSIUM CHLORIDE 20 MEQ/1
20 TABLET, EXTENDED RELEASE ORAL DAILY
Status: DISCONTINUED | OUTPATIENT
Start: 2018-07-30 | End: 2018-07-31 | Stop reason: HOSPADM

## 2018-07-30 RX ORDER — FLUTICASONE PROPIONATE 50 MCG
2 SPRAY, SUSPENSION (ML) NASAL DAILY
Status: DISCONTINUED | OUTPATIENT
Start: 2018-07-30 | End: 2018-07-31 | Stop reason: HOSPADM

## 2018-07-30 RX ORDER — ONDANSETRON 2 MG/ML
4 INJECTION INTRAMUSCULAR; INTRAVENOUS 2 TIMES DAILY PRN
Status: DISCONTINUED | OUTPATIENT
Start: 2018-07-30 | End: 2018-07-31 | Stop reason: HOSPADM

## 2018-07-30 RX ORDER — FLUTICASONE PROPIONATE AND SALMETEROL 500; 50 UG/1; UG/1
1 POWDER RESPIRATORY (INHALATION)
Status: DISCONTINUED | OUTPATIENT
Start: 2018-07-30 | End: 2018-07-31 | Stop reason: HOSPADM

## 2018-07-30 RX ORDER — 0.9 % SODIUM CHLORIDE 0.9 %
2 VIAL (ML) INJECTION PRN
Status: DISCONTINUED | OUTPATIENT
Start: 2018-07-30 | End: 2018-07-30

## 2018-07-30 RX ORDER — POLYETHYLENE GLYCOL 3350 17 G/17G
17 POWDER, FOR SOLUTION ORAL 2 TIMES DAILY
Status: DISCONTINUED | OUTPATIENT
Start: 2018-07-30 | End: 2018-07-31 | Stop reason: HOSPADM

## 2018-07-30 RX ORDER — SODIUM CHLORIDE, SODIUM LACTATE, POTASSIUM CHLORIDE, CALCIUM CHLORIDE 600; 310; 30; 20 MG/100ML; MG/100ML; MG/100ML; MG/100ML
INJECTION, SOLUTION INTRAVENOUS CONTINUOUS
Status: DISCONTINUED | OUTPATIENT
Start: 2018-07-30 | End: 2018-07-30

## 2018-07-30 RX ORDER — TIZANIDINE 2 MG/1
2 TABLET ORAL EVERY 8 HOURS PRN
Status: DISCONTINUED | OUTPATIENT
Start: 2018-07-30 | End: 2018-07-31 | Stop reason: HOSPADM

## 2018-07-30 RX ORDER — LIDOCAINE HYDROCHLORIDE 10 MG/ML
5-10 INJECTION, SOLUTION INFILTRATION; PERINEURAL PRN
Status: DISCONTINUED | OUTPATIENT
Start: 2018-07-30 | End: 2018-07-30

## 2018-07-30 RX ORDER — SENNOSIDES A AND B 8.6 MG/1
1 TABLET, FILM COATED ORAL NIGHTLY
Status: DISCONTINUED | OUTPATIENT
Start: 2018-07-30 | End: 2018-07-31 | Stop reason: HOSPADM

## 2018-07-30 RX ORDER — ISOSORBIDE DINITRATE 10 MG/1
10 TABLET ORAL 3 TIMES DAILY
Status: DISCONTINUED | OUTPATIENT
Start: 2018-07-30 | End: 2018-07-31 | Stop reason: HOSPADM

## 2018-07-30 RX ORDER — BUSPIRONE HYDROCHLORIDE 15 MG/1
30 TABLET ORAL 2 TIMES DAILY
Status: DISCONTINUED | OUTPATIENT
Start: 2018-07-30 | End: 2018-07-31 | Stop reason: HOSPADM

## 2018-07-30 RX ORDER — PANTOPRAZOLE SODIUM 40 MG/1
40 TABLET, DELAYED RELEASE ORAL
Status: DISCONTINUED | OUTPATIENT
Start: 2018-07-31 | End: 2018-07-31 | Stop reason: HOSPADM

## 2018-07-30 RX ORDER — GABAPENTIN 300 MG/1
300 CAPSULE ORAL NIGHTLY
Status: DISCONTINUED | OUTPATIENT
Start: 2018-07-30 | End: 2018-07-31 | Stop reason: HOSPADM

## 2018-07-30 RX ORDER — FLUCONAZOLE 100 MG/1
100 TABLET ORAL DAILY
Status: DISCONTINUED | OUTPATIENT
Start: 2018-07-31 | End: 2018-07-31 | Stop reason: HOSPADM

## 2018-07-30 RX ORDER — SODIUM CHLORIDE, SODIUM LACTATE, POTASSIUM CHLORIDE, CALCIUM CHLORIDE 600; 310; 30; 20 MG/100ML; MG/100ML; MG/100ML; MG/100ML
INJECTION, SOLUTION INTRAVENOUS CONTINUOUS PRN
Status: DISCONTINUED | OUTPATIENT
Start: 2018-07-30 | End: 2018-07-30

## 2018-07-30 RX ORDER — LEVOTHYROXINE SODIUM 0.03 MG/1
50 TABLET ORAL
Status: DISCONTINUED | OUTPATIENT
Start: 2018-07-31 | End: 2018-07-31 | Stop reason: HOSPADM

## 2018-07-30 RX ADMIN — FLUTICASONE PROPIONATE AND SALMETEROL 1 PUFF: 50; 500 POWDER RESPIRATORY (INHALATION) at 20:04

## 2018-07-30 RX ADMIN — POTASSIUM CITRATE 1080 MG: 10 TABLET, EXTENDED RELEASE ORAL at 16:07

## 2018-07-30 RX ADMIN — Medication 0.2 MG: at 11:20

## 2018-07-30 RX ADMIN — SODIUM CHLORIDE, POTASSIUM CHLORIDE, SODIUM LACTATE AND CALCIUM CHLORIDE: 600; 310; 30; 20 INJECTION, SOLUTION INTRAVENOUS at 07:46

## 2018-07-30 RX ADMIN — PROPOFOL 150 MG: 10 INJECTION, EMULSION INTRAVENOUS at 08:29

## 2018-07-30 RX ADMIN — HYDROCODONE BITARTRATE AND ACETAMINOPHEN 1 TABLET: 5; 325 TABLET ORAL at 18:30

## 2018-07-30 RX ADMIN — SODIUM CHLORIDE, POTASSIUM CHLORIDE, SODIUM LACTATE AND CALCIUM CHLORIDE: 600; 310; 30; 20 INJECTION, SOLUTION INTRAVENOUS at 07:16

## 2018-07-30 RX ADMIN — Medication 0.2 MG: at 11:05

## 2018-07-30 RX ADMIN — HYDROCODONE BITARTRATE AND ACETAMINOPHEN 1 TABLET: 5; 325 TABLET ORAL at 17:39

## 2018-07-30 RX ADMIN — FENTANYL CITRATE 25 MCG: 50 INJECTION INTRAMUSCULAR; INTRAVENOUS at 10:40

## 2018-07-30 RX ADMIN — DEXAMETHASONE SODIUM PHOSPHATE 4 MG: 4 INJECTION, SOLUTION INTRAMUSCULAR; INTRAVENOUS at 08:43

## 2018-07-30 RX ADMIN — Medication 0.2 MG: at 10:45

## 2018-07-30 RX ADMIN — POTASSIUM CHLORIDE 20 MEQ: 1500 TABLET, EXTENDED RELEASE ORAL at 16:07

## 2018-07-30 RX ADMIN — GABAPENTIN 200 MG: 100 CAPSULE ORAL at 18:30

## 2018-07-30 RX ADMIN — FENTANYL CITRATE 50 MCG: 50 INJECTION INTRAMUSCULAR; INTRAVENOUS at 08:59

## 2018-07-30 RX ADMIN — FENTANYL CITRATE 50 MCG: 50 INJECTION INTRAMUSCULAR; INTRAVENOUS at 09:45

## 2018-07-30 RX ADMIN — MORPHINE SULFATE 15 MG: 15 TABLET, EXTENDED RELEASE ORAL at 16:07

## 2018-07-30 RX ADMIN — GABAPENTIN 300 MG: 300 CAPSULE ORAL at 21:06

## 2018-07-30 RX ADMIN — WARFARIN SODIUM 5 MG: 5 TABLET ORAL at 18:30

## 2018-07-30 RX ADMIN — Medication 0.2 MG: at 10:55

## 2018-07-30 RX ADMIN — VANCOMYCIN HYDROCHLORIDE 1500 MG: 10 INJECTION, POWDER, LYOPHILIZED, FOR SOLUTION INTRAVENOUS at 07:34

## 2018-07-30 RX ADMIN — ACETAMINOPHEN 1000 MG: 10 INJECTION, SOLUTION INTRAVENOUS at 10:04

## 2018-07-30 RX ADMIN — PROPOFOL 50 MCG/KG/MIN: 10 INJECTION, EMULSION INTRAVENOUS at 09:12

## 2018-07-30 RX ADMIN — ISOSORBIDE DINITRATE 10 MG: 10 TABLET ORAL at 21:06

## 2018-07-30 RX ADMIN — ROPIVACAINE HYDROCHLORIDE 30 ML: 5 INJECTION, SOLUTION EPIDURAL; INFILTRATION; PERINEURAL at 07:58

## 2018-07-30 RX ADMIN — Medication 0.2 MG: at 10:40

## 2018-07-30 RX ADMIN — FAMOTIDINE 20 MG: 20 TABLET ORAL at 07:15

## 2018-07-30 RX ADMIN — TIZANIDINE 2 MG: 2 TABLET ORAL at 21:06

## 2018-07-30 RX ADMIN — FENTANYL CITRATE 25 MCG: 50 INJECTION INTRAMUSCULAR; INTRAVENOUS at 10:50

## 2018-07-30 RX ADMIN — PHENYTOIN 2 MG: 125 SUSPENSION ORAL at 21:06

## 2018-07-30 RX ADMIN — HYDROCODONE BITARTRATE AND ACETAMINOPHEN 2 TABLET: 5; 325 TABLET ORAL at 22:30

## 2018-07-30 RX ADMIN — ONDANSETRON 4 MG: 2 INJECTION INTRAMUSCULAR; INTRAVENOUS at 10:13

## 2018-07-30 RX ADMIN — FLUTICASONE PROPIONATE 2 SPRAY: 50 SPRAY, METERED NASAL at 16:08

## 2018-07-30 RX ADMIN — Medication 0.2 MG: at 11:10

## 2018-07-30 RX ADMIN — METOPROLOL TARTRATE 25 MG: 25 TABLET, FILM COATED ORAL at 18:32

## 2018-07-30 RX ADMIN — Medication 0.2 MG: at 11:15

## 2018-07-30 RX ADMIN — Medication 0.2 MG: at 11:00

## 2018-07-30 RX ADMIN — SODIUM CHLORIDE: 9 INJECTION, SOLUTION INTRAVENOUS at 15:50

## 2018-07-30 RX ADMIN — BUSPIRONE HYDROCHLORIDE 30 MG: 15 TABLET ORAL at 18:29

## 2018-07-30 RX ADMIN — BUPIVACAINE 60 ML: 13.3 INJECTION, SUSPENSION, LIPOSOMAL INFILTRATION at 09:35

## 2018-07-30 RX ADMIN — DULOXETINE 60 MG: 60 CAPSULE, DELAYED RELEASE ORAL at 18:29

## 2018-07-30 RX ADMIN — VANCOMYCIN HYDROCHLORIDE 1000 MG: 1 INJECTION, POWDER, LYOPHILIZED, FOR SOLUTION INTRAVENOUS at 16:08

## 2018-07-30 RX ADMIN — FENTANYL CITRATE 50 MCG: 50 INJECTION INTRAMUSCULAR; INTRAVENOUS at 09:13

## 2018-07-30 RX ADMIN — FENTANYL CITRATE 50 MCG: 50 INJECTION INTRAMUSCULAR; INTRAVENOUS at 08:29

## 2018-07-30 RX ADMIN — Medication 0.2 MG: at 10:50

## 2018-07-30 RX ADMIN — ISOSORBIDE DINITRATE 10 MG: 10 TABLET ORAL at 16:07

## 2018-07-30 RX ADMIN — Medication 0.2 MG: at 10:35

## 2018-07-30 RX ADMIN — TRANEXAMIC ACID 1000 MG: 100 INJECTION, SOLUTION INTRAVENOUS at 09:38

## 2018-07-30 RX ADMIN — FENTANYL CITRATE 50 MCG: 50 INJECTION INTRAMUSCULAR; INTRAVENOUS at 08:52

## 2018-07-30 RX ADMIN — FENTANYL CITRATE 25 MCG: 50 INJECTION INTRAMUSCULAR; INTRAVENOUS at 10:45

## 2018-07-30 RX ADMIN — SODIUM CHLORIDE 300 ML: 900 IRRIGANT IRRIGATION at 09:26

## 2018-07-30 RX ADMIN — HYDROMORPHONE HYDROCHLORIDE 0.2 MG: 1 INJECTION, SOLUTION INTRAMUSCULAR; INTRAVENOUS; SUBCUTANEOUS at 14:24

## 2018-07-30 RX ADMIN — FENTANYL CITRATE 25 MCG: 50 INJECTION INTRAMUSCULAR; INTRAVENOUS at 10:55

## 2018-07-30 RX ADMIN — SENNA 8.6 MG: 8.6 TABLET, COATED ORAL at 21:07

## 2018-07-30 ASSESSMENT — ACTIVITIES OF DAILY LIVING (ADL)
EATING: MODIFIED INDEPENDENT
GROOMING: MODIFIED INDEPENDENT
PRIOR_ADL_BATHING: MODIFIED INDEPENDENT
ADL_SHORT_OF_BREATH: NO
RECENT_DECLINE_ADL: NO
HISTORY OF FALLING IN THE LAST YEAR (PRIOR TO ADMISSION): YES
CHRONIC_PAIN_PRESENT: NO
NEEDS_ASSIST: NO
PRIOR_ADL: MODIFIED INDEPENDENT
PRIOR_ADL_TOILETING: MODIFIED INDEPENDENT
ADL_SCORE: 12
ADL_BEFORE_ADMISSION: INDEPENDENT
SENSORY_SUPPORT_DEVICES: EYEGLASSES
ADL_SCORE: 12
ADL_BEFORE_ADMISSION: INDEPENDENT

## 2018-07-30 ASSESSMENT — PAIN SCALES - GENERAL
PAIN_LEVEL_AT_REST: 9
PAIN_LEVEL_AT_REST: 7
PAIN_LEVEL_AT_REST: 5
PAIN_LEVEL_AT_REST: 3
PAIN_LEVEL_AT_REST: 6
PAIN_LEVEL_AT_REST: 4
PAIN_LEVEL_AT_REST: 5
PAIN_LEVEL_AT_REST: 9
PAIN_LEVEL_AT_REST: 5
PAIN_LEVEL_AT_REST: 7
PAIN_LEVEL_AT_REST: 5
PAIN_LEVEL_AT_REST: 6
PAIN_LEVEL_AT_REST: 7
PAIN_LEVEL_AT_REST: 8
PAIN_LEVEL_AT_REST: 7
PAIN_LEVEL_AT_REST: 7
PAIN_LEVEL_AT_REST: 8
PAIN_LEVEL_AT_REST: 5
PAIN_LEVEL_WITH_ACTIVITY: 6
PAIN_LEVEL_AT_REST: 6
PAIN_LEVEL_AT_REST: 6
PAIN_LEVEL_AT_REST: 8
PAIN_LEVEL_AT_REST: 4
PAIN_LEVEL_AT_REST: 5

## 2018-07-30 ASSESSMENT — LIFESTYLE VARIABLES
HOW OFTEN DO YOU HAVE A DRINK CONTAINING ALCOHOL: MONTHLY OR LESS
ALCOHOL_USE_STATUS: NO OR LOW RISK WITH VALIDATED TOOL
AUDIT-C TOTAL SCORE: 1
E-CIGARETTE_USE: NO E-CIGARETTES USE IN THE LAST 30 DAYS
HOW OFTEN DO YOU HAVE 6 OR MORE DRINKS ON ONE OCCASION: NEVER
HOW MANY STANDARD DRINKS CONTAINING ALCOHOL DO YOU HAVE ON A TYPICAL DAY: 0,1 OR 2

## 2018-07-31 VITALS
SYSTOLIC BLOOD PRESSURE: 142 MMHG | WEIGHT: 267.86 LBS | TEMPERATURE: 97.5 F | HEART RATE: 79 BPM | RESPIRATION RATE: 17 BRPM | HEIGHT: 65 IN | BODY MASS INDEX: 44.63 KG/M2 | DIASTOLIC BLOOD PRESSURE: 68 MMHG | OXYGEN SATURATION: 97 %

## 2018-07-31 LAB
ANION GAP SERPL CALC-SCNC: 15 MMOL/L (ref 10–20)
BUN SERPL-MCNC: 20 MG/DL (ref 6–20)
BUN/CREAT SERPL: 15 (ref 7–25)
CALCIUM SERPL-MCNC: 8.9 MG/DL (ref 8.4–10.2)
CHLORIDE SERPL-SCNC: 92 MMOL/L (ref 98–107)
CO2 SERPL-SCNC: 25 MMOL/L (ref 21–32)
CREAT SERPL-MCNC: 1.33 MG/DL (ref 0.51–0.95)
ERYTHROCYTE [DISTWIDTH] IN BLOOD: 12.7 % (ref 11–15)
GLUCOSE BLDC GLUCOMTR-MCNC: 141 MG/DL (ref 65–99)
GLUCOSE SERPL-MCNC: 149 MG/DL (ref 65–99)
HBA1C MFR BLD: 7.8 % (ref 4.5–5.6)
HCT VFR BLD CALC: 31.7 % (ref 36–46.5)
HGB BLD-MCNC: 10.5 G/DL (ref 12–15.5)
INR PPP: 1
MAGNESIUM SERPL-MCNC: 2.3 MG/DL (ref 1.7–2.4)
MCH RBC QN AUTO: 30.3 PG (ref 26–34)
MCHC RBC AUTO-ENTMCNC: 33.1 G/DL (ref 32–36.5)
MCV RBC AUTO: 91.4 FL (ref 78–100)
PLATELET # BLD: 245 K/MCL (ref 140–450)
POTASSIUM SERPL-SCNC: 4.6 MMOL/L (ref 3.4–5.1)
PROTHROMBIN TIME: 10.2 SEC (ref 9.7–11.8)
RBC # BLD: 3.47 MIL/MCL (ref 4–5.2)
SODIUM SERPL-SCNC: 127 MMOL/L (ref 135–145)
WBC # BLD: 7.7 K/MCL (ref 4.2–11)

## 2018-07-31 PROCEDURE — 82962 GLUCOSE BLOOD TEST: CPT

## 2018-07-31 PROCEDURE — 83036 HEMOGLOBIN GLYCOSYLATED A1C: CPT

## 2018-07-31 PROCEDURE — 0SRD0J9 REPLACEMENT OF LEFT KNEE JOINT WITH SYNTHETIC SUBSTITUTE, CEMENTED, OPEN APPROACH: ICD-10-PCS | Performed by: ORTHOPAEDIC SURGERY

## 2018-07-31 PROCEDURE — 99232 SBSQ HOSP IP/OBS MODERATE 35: CPT | Performed by: INTERNAL MEDICINE

## 2018-07-31 PROCEDURE — 85610 PROTHROMBIN TIME: CPT

## 2018-07-31 PROCEDURE — 10002803 HB RX 637: Performed by: PHYSICIAN ASSISTANT

## 2018-07-31 PROCEDURE — 83735 ASSAY OF MAGNESIUM: CPT

## 2018-07-31 PROCEDURE — 94640 AIRWAY INHALATION TREATMENT: CPT

## 2018-07-31 PROCEDURE — 80048 BASIC METABOLIC PNL TOTAL CA: CPT

## 2018-07-31 PROCEDURE — 97530 THERAPEUTIC ACTIVITIES: CPT

## 2018-07-31 PROCEDURE — 97116 GAIT TRAINING THERAPY: CPT

## 2018-07-31 PROCEDURE — 85027 COMPLETE CBC AUTOMATED: CPT

## 2018-07-31 PROCEDURE — 10002800 HB RX 250 W HCPCS: Performed by: ORTHOPAEDIC SURGERY

## 2018-07-31 PROCEDURE — 10004173 HB COUNTER-THERAPY VISIT PT

## 2018-07-31 PROCEDURE — 97165 OT EVAL LOW COMPLEX 30 MIN: CPT

## 2018-07-31 PROCEDURE — 10002803 HB RX 637: Performed by: ORTHOPAEDIC SURGERY

## 2018-07-31 PROCEDURE — 10004172 HB COUNTER-THERAPY VISIT OT

## 2018-07-31 PROCEDURE — 97110 THERAPEUTIC EXERCISES: CPT

## 2018-07-31 PROCEDURE — 97535 SELF CARE MNGMENT TRAINING: CPT

## 2018-07-31 RX ORDER — MORPHINE SULFATE 15 MG/1
15 TABLET, FILM COATED, EXTENDED RELEASE ORAL EVERY 12 HOURS SCHEDULED
Qty: 14 TABLET | Refills: 0 | Status: SHIPPED | OUTPATIENT
Start: 2018-07-31 | End: 2018-08-15 | Stop reason: ALTCHOICE

## 2018-07-31 RX ORDER — OXYCODONE HYDROCHLORIDE 5 MG/1
5 TABLET ORAL EVERY 4 HOURS PRN
Qty: 40 TABLET | Refills: 0 | Status: SHIPPED | OUTPATIENT
Start: 2018-07-31 | End: 2018-08-03 | Stop reason: SDUPTHER

## 2018-07-31 RX ORDER — HEPARIN SODIUM,PORCINE/PF 10 UNIT/ML
50 SYRINGE (ML) INTRAVENOUS PRN
Status: DISCONTINUED | OUTPATIENT
Start: 2018-07-31 | End: 2018-07-31 | Stop reason: HOSPADM

## 2018-07-31 RX ORDER — WARFARIN SODIUM 2 MG/1
5 TABLET ORAL DAILY
Qty: 30 TABLET | Refills: 0 | Status: SHIPPED | OUTPATIENT
Start: 2018-07-31 | End: 2018-08-10 | Stop reason: SDUPTHER

## 2018-07-31 RX ADMIN — METOPROLOL TARTRATE 25 MG: 25 TABLET, FILM COATED ORAL at 09:55

## 2018-07-31 RX ADMIN — DIAZEPAM 2 MG: 2 TABLET ORAL at 01:34

## 2018-07-31 RX ADMIN — LEVOTHYROXINE SODIUM 50 MCG: 0.03 TABLET ORAL at 06:07

## 2018-07-31 RX ADMIN — LORATADINE 10 MG: 10 TABLET ORAL at 09:56

## 2018-07-31 RX ADMIN — HYDROCODONE BITARTRATE AND ACETAMINOPHEN 2 TABLET: 5; 325 TABLET ORAL at 02:40

## 2018-07-31 RX ADMIN — HYDROCODONE BITARTRATE AND ACETAMINOPHEN 2 TABLET: 5; 325 TABLET ORAL at 11:12

## 2018-07-31 RX ADMIN — DIAZEPAM 2 MG: 2 TABLET ORAL at 09:57

## 2018-07-31 RX ADMIN — POTASSIUM CHLORIDE 20 MEQ: 1500 TABLET, EXTENDED RELEASE ORAL at 09:55

## 2018-07-31 RX ADMIN — HYDROCODONE BITARTRATE AND ACETAMINOPHEN 2 TABLET: 5; 325 TABLET ORAL at 07:13

## 2018-07-31 RX ADMIN — MORPHINE SULFATE 15 MG: 15 TABLET, EXTENDED RELEASE ORAL at 06:07

## 2018-07-31 RX ADMIN — POTASSIUM CITRATE 1080 MG: 10 TABLET, EXTENDED RELEASE ORAL at 09:57

## 2018-07-31 RX ADMIN — SODIUM CHLORIDE, PRESERVATIVE FREE 50 UNITS: 5 INJECTION INTRAVENOUS at 14:00

## 2018-07-31 RX ADMIN — POLYETHYLENE GLYCOL 3350 17 G: 17 POWDER, FOR SOLUTION ORAL at 09:59

## 2018-07-31 RX ADMIN — FUROSEMIDE 80 MG: 40 TABLET ORAL at 06:07

## 2018-07-31 RX ADMIN — BUPROPION HYDROCHLORIDE 300 MG: 150 TABLET, EXTENDED RELEASE ORAL at 09:56

## 2018-07-31 RX ADMIN — PANTOPRAZOLE SODIUM 40 MG: 40 TABLET, DELAYED RELEASE ORAL at 06:07

## 2018-07-31 RX ADMIN — FLUTICASONE PROPIONATE AND SALMETEROL 1 PUFF: 50; 500 POWDER RESPIRATORY (INHALATION) at 10:51

## 2018-07-31 RX ADMIN — FAMOTIDINE 20 MG: 20 TABLET ORAL at 09:57

## 2018-07-31 RX ADMIN — DULOXETINE 60 MG: 60 CAPSULE, DELAYED RELEASE ORAL at 09:57

## 2018-07-31 RX ADMIN — ISOSORBIDE DINITRATE 10 MG: 10 TABLET ORAL at 09:56

## 2018-07-31 RX ADMIN — GABAPENTIN 200 MG: 100 CAPSULE ORAL at 09:55

## 2018-07-31 RX ADMIN — FLUCONAZOLE 100 MG: 100 TABLET ORAL at 09:57

## 2018-07-31 ASSESSMENT — PAIN SCALES - GENERAL
PAIN_LEVEL_AT_REST: 8
PAIN_LEVEL_AT_REST: 5
PAIN_LEVEL_AT_REST: 7
PAIN_LEVEL_AT_REST: 5
PAIN_LEVEL_AT_REST: 5
PAIN_LEVEL_AT_REST: 9
PAIN_LEVEL_AT_REST: 7
PAIN_LEVEL_AT_REST: 5
PAIN_LEVEL_AT_REST: 5

## 2018-08-01 ENCOUNTER — TELEPHONE (OUTPATIENT)
Dept: ORTHOPEDICS | Age: 63
End: 2018-08-01

## 2018-08-01 DIAGNOSIS — Z79.01 WARFARIN ANTICOAGULATION: Primary | ICD-10-CM

## 2018-08-02 ENCOUNTER — TELEPHONE (OUTPATIENT)
Dept: ALLERGY | Age: 63
End: 2018-08-02

## 2018-08-02 ENCOUNTER — NURSE ONLY (OUTPATIENT)
Dept: INTERNAL MEDICINE | Age: 63
End: 2018-08-02

## 2018-08-02 ENCOUNTER — LAB SERVICES (OUTPATIENT)
Dept: LAB | Age: 63
End: 2018-08-02

## 2018-08-02 ENCOUNTER — TELEPHONE (OUTPATIENT)
Dept: ORTHOPEDICS | Age: 63
End: 2018-08-02

## 2018-08-02 DIAGNOSIS — Z79.01 WARFARIN ANTICOAGULATION: ICD-10-CM

## 2018-08-02 LAB
INR PPP: 1.6
PROTHROMBIN TIME: 15.1 SEC (ref 9.7–11.8)

## 2018-08-02 PROCEDURE — 36415 COLL VENOUS BLD VENIPUNCTURE: CPT | Performed by: INTERNAL MEDICINE

## 2018-08-02 PROCEDURE — 85610 PROTHROMBIN TIME: CPT | Performed by: INTERNAL MEDICINE

## 2018-08-03 ENCOUNTER — OFFICE VISIT (OUTPATIENT)
Dept: ORTHOPEDICS | Age: 63
End: 2018-08-03

## 2018-08-03 DIAGNOSIS — G89.29 CHRONIC PAIN OF LEFT KNEE: ICD-10-CM

## 2018-08-03 DIAGNOSIS — M25.562 CHRONIC PAIN OF LEFT KNEE: ICD-10-CM

## 2018-08-03 DIAGNOSIS — Z96.652 HISTORY OF TOTAL LEFT KNEE REPLACEMENT: Primary | ICD-10-CM

## 2018-08-03 DIAGNOSIS — Z79.01 LONG TERM (CURRENT) USE OF ANTICOAGULANTS: ICD-10-CM

## 2018-08-03 PROCEDURE — 99024 POSTOP FOLLOW-UP VISIT: CPT | Performed by: PHYSICIAN ASSISTANT

## 2018-08-03 RX ORDER — OXYCODONE HYDROCHLORIDE 5 MG/1
5-10 TABLET ORAL EVERY 4 HOURS PRN
Qty: 40 TABLET | Refills: 0 | Status: SHIPPED | OUTPATIENT
Start: 2018-08-03 | End: 2018-08-13 | Stop reason: SDUPTHER

## 2018-08-06 ENCOUNTER — CASE MANAGEMENT (OUTPATIENT)
Dept: ALLERGY | Age: 63
End: 2018-08-06

## 2018-08-06 ENCOUNTER — TELEPHONE (OUTPATIENT)
Dept: ORTHOPEDICS | Age: 63
End: 2018-08-06

## 2018-08-06 LAB
INR PPP: 1.9
PROTHROMBIN TIME: 18.2 SEC (ref 9.7–11.8)

## 2018-08-09 DIAGNOSIS — I25.10 CORONARY ARTERY DISEASE INVOLVING NATIVE CORONARY ARTERY OF NATIVE HEART WITHOUT ANGINA PECTORIS: ICD-10-CM

## 2018-08-09 DIAGNOSIS — I20.1 PRINZMETAL ANGINA (CMD): ICD-10-CM

## 2018-08-09 LAB
INR PPP: 2.5
PROTHROMBIN TIME: 23.6 SEC (ref 9.7–11.8)

## 2018-08-09 RX ORDER — ISOSORBIDE DINITRATE 10 MG/1
10 TABLET ORAL 3 TIMES DAILY
Qty: 90 TABLET | Refills: 1 | Status: SHIPPED | OUTPATIENT
Start: 2018-08-09 | End: 2018-09-28 | Stop reason: SDUPTHER

## 2018-08-10 ENCOUNTER — TELEPHONE (OUTPATIENT)
Dept: ORTHOPEDICS | Age: 63
End: 2018-08-10

## 2018-08-10 RX ORDER — WARFARIN SODIUM 2 MG/1
5 TABLET ORAL DAILY
Qty: 40 TABLET | Refills: 0 | Status: SHIPPED | OUTPATIENT
Start: 2018-08-10 | End: 2018-10-08

## 2018-08-13 ENCOUNTER — TELEPHONE (OUTPATIENT)
Dept: ORTHOPEDICS | Age: 63
End: 2018-08-13

## 2018-08-13 LAB
INR PPP: 1.9
PROTHROMBIN TIME: 18.3 SEC (ref 9.7–11.8)

## 2018-08-13 RX ORDER — OXYCODONE HYDROCHLORIDE 5 MG/1
5-10 TABLET ORAL EVERY 4 HOURS PRN
Qty: 40 TABLET | Refills: 0 | Status: SHIPPED | OUTPATIENT
Start: 2018-08-13 | End: 2018-08-21 | Stop reason: SDUPTHER

## 2018-08-15 ENCOUNTER — OFFICE VISIT (OUTPATIENT)
Dept: ORTHOPEDICS | Age: 63
End: 2018-08-15

## 2018-08-15 VITALS — RESPIRATION RATE: 18 BRPM

## 2018-08-15 DIAGNOSIS — Z79.01 LONG TERM (CURRENT) USE OF ANTICOAGULANTS: ICD-10-CM

## 2018-08-15 DIAGNOSIS — G89.29 CHRONIC PAIN OF LEFT KNEE: ICD-10-CM

## 2018-08-15 DIAGNOSIS — M25.562 CHRONIC PAIN OF LEFT KNEE: ICD-10-CM

## 2018-08-15 DIAGNOSIS — Z96.652 HISTORY OF TOTAL LEFT KNEE REPLACEMENT: Primary | ICD-10-CM

## 2018-08-15 PROCEDURE — 99024 POSTOP FOLLOW-UP VISIT: CPT | Performed by: PHYSICIAN ASSISTANT

## 2018-08-16 ENCOUNTER — NURSE ONLY (OUTPATIENT)
Dept: PULMONOLOGY | Age: 63
End: 2018-08-16

## 2018-08-16 LAB
INR PPP: 1.6
PROTHROMBIN TIME: 15.7 SEC (ref 9.7–11.8)

## 2018-08-16 PROCEDURE — 96372 THER/PROPH/DIAG INJ SC/IM: CPT | Performed by: INTERNAL MEDICINE

## 2018-08-17 ENCOUNTER — TELEPHONE (OUTPATIENT)
Dept: ORTHOPEDICS | Age: 63
End: 2018-08-17

## 2018-08-20 ENCOUNTER — APPOINTMENT (OUTPATIENT)
Dept: LAB | Age: 63
End: 2018-08-20

## 2018-08-20 ENCOUNTER — LAB SERVICES (OUTPATIENT)
Dept: LAB | Age: 63
End: 2018-08-20

## 2018-08-20 ENCOUNTER — HOSPITAL ENCOUNTER (OUTPATIENT)
Dept: REHABILITATION | Age: 63
Discharge: STILL ADMITTED OR ADMINISTRATIVE | End: 2018-08-20
Attending: ORTHOPAEDIC SURGERY

## 2018-08-20 ENCOUNTER — TELEPHONE (OUTPATIENT)
Dept: ORTHOPEDICS | Age: 63
End: 2018-08-20

## 2018-08-20 DIAGNOSIS — G89.29 CHRONIC PAIN OF LEFT KNEE: ICD-10-CM

## 2018-08-20 DIAGNOSIS — Z96.652 HISTORY OF TOTAL LEFT KNEE REPLACEMENT: Primary | ICD-10-CM

## 2018-08-20 DIAGNOSIS — M25.562 CHRONIC PAIN OF LEFT KNEE: ICD-10-CM

## 2018-08-20 DIAGNOSIS — Z79.01 LONG TERM (CURRENT) USE OF ANTICOAGULANTS: ICD-10-CM

## 2018-08-20 LAB
INR PPP: 1.6
PROTHROMBIN TIME: 16.1 SEC (ref 9.7–11.8)

## 2018-08-20 PROCEDURE — 10004173 HB COUNTER-THERAPY VISIT PT: Performed by: PHYSICAL THERAPIST

## 2018-08-20 PROCEDURE — 97161 PT EVAL LOW COMPLEX 20 MIN: CPT | Performed by: PHYSICAL THERAPIST

## 2018-08-20 PROCEDURE — 85610 PROTHROMBIN TIME: CPT | Performed by: INTERNAL MEDICINE

## 2018-08-20 PROCEDURE — 97116 GAIT TRAINING THERAPY: CPT | Performed by: PHYSICAL THERAPIST

## 2018-08-20 PROCEDURE — 36415 COLL VENOUS BLD VENIPUNCTURE: CPT | Performed by: INTERNAL MEDICINE

## 2018-08-20 PROCEDURE — 97110 THERAPEUTIC EXERCISES: CPT | Performed by: PHYSICAL THERAPIST

## 2018-08-21 ENCOUNTER — TELEPHONE (OUTPATIENT)
Dept: ORTHOPEDICS | Age: 63
End: 2018-08-21

## 2018-08-21 RX ORDER — OXYCODONE HYDROCHLORIDE 5 MG/1
5-10 TABLET ORAL EVERY 4 HOURS PRN
Qty: 40 TABLET | Refills: 0 | Status: SHIPPED | OUTPATIENT
Start: 2018-08-21 | End: 2018-09-06 | Stop reason: SDUPTHER

## 2018-08-23 ENCOUNTER — HOSPITAL ENCOUNTER (OUTPATIENT)
Dept: REHABILITATION | Age: 63
Discharge: STILL ADMITTED OR ADMINISTRATIVE | End: 2018-08-23
Attending: ORTHOPAEDIC SURGERY

## 2018-08-23 ENCOUNTER — TELEPHONE (OUTPATIENT)
Dept: ORTHOPEDICS | Age: 63
End: 2018-08-23

## 2018-08-23 ENCOUNTER — LAB SERVICES (OUTPATIENT)
Dept: LAB | Age: 63
End: 2018-08-23

## 2018-08-23 DIAGNOSIS — Z79.01 WARFARIN ANTICOAGULATION: ICD-10-CM

## 2018-08-23 LAB
INR PPP: 2
PROTHROMBIN TIME: 19.4 SEC (ref 9.7–11.8)

## 2018-08-23 PROCEDURE — 10004173 HB COUNTER-THERAPY VISIT PT: Performed by: PHYSICAL THERAPIST

## 2018-08-23 PROCEDURE — 97140 MANUAL THERAPY 1/> REGIONS: CPT | Performed by: PHYSICAL THERAPIST

## 2018-08-23 PROCEDURE — 85610 PROTHROMBIN TIME: CPT | Performed by: INTERNAL MEDICINE

## 2018-08-23 PROCEDURE — 97110 THERAPEUTIC EXERCISES: CPT | Performed by: PHYSICAL THERAPIST

## 2018-08-23 PROCEDURE — 36415 COLL VENOUS BLD VENIPUNCTURE: CPT | Performed by: INTERNAL MEDICINE

## 2018-08-28 ENCOUNTER — HOSPITAL ENCOUNTER (OUTPATIENT)
Dept: REHABILITATION | Age: 63
Discharge: STILL ADMITTED OR ADMINISTRATIVE | End: 2018-08-28
Attending: ORTHOPAEDIC SURGERY

## 2018-08-28 ENCOUNTER — OFFICE VISIT (OUTPATIENT)
Dept: ORTHOPEDICS | Age: 63
End: 2018-08-28

## 2018-08-28 ENCOUNTER — IMAGING SERVICES (OUTPATIENT)
Dept: GENERAL RADIOLOGY | Age: 63
End: 2018-08-28
Attending: ORTHOPAEDIC SURGERY

## 2018-08-28 VITALS — HEIGHT: 65 IN | BODY MASS INDEX: 44.63 KG/M2 | WEIGHT: 267.86 LBS | RESPIRATION RATE: 18 BRPM

## 2018-08-28 DIAGNOSIS — M25.511 CHRONIC RIGHT SHOULDER PAIN: ICD-10-CM

## 2018-08-28 DIAGNOSIS — M25.532 BILATERAL WRIST PAIN: ICD-10-CM

## 2018-08-28 DIAGNOSIS — G89.29 CHRONIC RIGHT SHOULDER PAIN: ICD-10-CM

## 2018-08-28 DIAGNOSIS — M75.102 ROTATOR CUFF SYNDROME OF LEFT SHOULDER: ICD-10-CM

## 2018-08-28 DIAGNOSIS — M25.512 LEFT SHOULDER PAIN, UNSPECIFIED CHRONICITY: ICD-10-CM

## 2018-08-28 DIAGNOSIS — M25.531 BILATERAL WRIST PAIN: ICD-10-CM

## 2018-08-28 DIAGNOSIS — M19.032 PRIMARY OSTEOARTHRITIS OF LEFT WRIST: ICD-10-CM

## 2018-08-28 DIAGNOSIS — M25.512 LEFT SHOULDER PAIN, UNSPECIFIED CHRONICITY: Primary | ICD-10-CM

## 2018-08-28 DIAGNOSIS — M79.644 PAIN OF RIGHT THUMB: ICD-10-CM

## 2018-08-28 DIAGNOSIS — M18.11 PRIMARY OSTEOARTHRITIS OF FIRST CARPOMETACARPAL JOINT OF RIGHT HAND: ICD-10-CM

## 2018-08-28 PROCEDURE — 97140 MANUAL THERAPY 1/> REGIONS: CPT | Performed by: PHYSICAL THERAPIST

## 2018-08-28 PROCEDURE — 10004173 HB COUNTER-THERAPY VISIT PT: Performed by: PHYSICAL THERAPIST

## 2018-08-28 PROCEDURE — 73030 X-RAY EXAM OF SHOULDER: CPT | Performed by: RADIOLOGY

## 2018-08-28 PROCEDURE — L3908 WHO COCK-UP NONMOLDE PRE OTS: HCPCS | Performed by: ORTHOPAEDIC SURGERY

## 2018-08-28 PROCEDURE — 99213 OFFICE O/P EST LOW 20 MIN: CPT | Performed by: ORTHOPAEDIC SURGERY

## 2018-08-28 PROCEDURE — 73140 X-RAY EXAM OF FINGER(S): CPT | Performed by: RADIOLOGY

## 2018-08-28 PROCEDURE — 97110 THERAPEUTIC EXERCISES: CPT | Performed by: PHYSICAL THERAPIST

## 2018-08-28 PROCEDURE — 73110 X-RAY EXAM OF WRIST: CPT | Performed by: RADIOLOGY

## 2018-08-29 ENCOUNTER — APPOINTMENT (OUTPATIENT)
Dept: BEHAVIORAL HEALTH | Age: 63
End: 2018-08-29

## 2018-08-30 ENCOUNTER — HOSPITAL ENCOUNTER (OUTPATIENT)
Dept: REHABILITATION | Age: 63
Discharge: STILL ADMITTED OR ADMINISTRATIVE | End: 2018-08-30
Attending: ORTHOPAEDIC SURGERY

## 2018-08-30 PROCEDURE — 97140 MANUAL THERAPY 1/> REGIONS: CPT | Performed by: PHYSICAL THERAPIST

## 2018-08-30 PROCEDURE — 10004173 HB COUNTER-THERAPY VISIT PT: Performed by: PHYSICAL THERAPIST

## 2018-08-30 PROCEDURE — 97110 THERAPEUTIC EXERCISES: CPT | Performed by: PHYSICAL THERAPIST

## 2018-08-31 ENCOUNTER — TELEPHONE (OUTPATIENT)
Dept: INTERNAL MEDICINE | Age: 63
End: 2018-08-31

## 2018-08-31 RX ORDER — ONDANSETRON 4 MG/1
4 TABLET, FILM COATED ORAL EVERY 8 HOURS PRN
Qty: 20 TABLET | Refills: 2 | Status: SHIPPED | OUTPATIENT
Start: 2018-08-31 | End: 2019-10-01 | Stop reason: SDUPTHER

## 2018-09-04 ENCOUNTER — E-ADVICE (OUTPATIENT)
Dept: NEUROLOGY | Age: 63
End: 2018-09-04

## 2018-09-04 RX ORDER — GABAPENTIN 100 MG/1
200 CAPSULE ORAL 2 TIMES DAILY
Qty: 360 CAPSULE | Refills: 3 | Status: SHIPPED | OUTPATIENT
Start: 2018-09-04 | End: 2018-10-23 | Stop reason: SDUPTHER

## 2018-09-05 ENCOUNTER — HOSPITAL ENCOUNTER (OUTPATIENT)
Dept: REHABILITATION | Age: 63
Discharge: STILL ADMITTED OR ADMINISTRATIVE | End: 2018-09-05
Attending: ORTHOPAEDIC SURGERY

## 2018-09-05 PROCEDURE — 97140 MANUAL THERAPY 1/> REGIONS: CPT | Performed by: PHYSICAL THERAPIST

## 2018-09-05 PROCEDURE — 10004173 HB COUNTER-THERAPY VISIT PT: Performed by: PHYSICAL THERAPIST

## 2018-09-06 ENCOUNTER — APPOINTMENT (OUTPATIENT)
Dept: REHABILITATION | Age: 63
End: 2018-09-06
Attending: ORTHOPAEDIC SURGERY

## 2018-09-06 RX ORDER — OXYCODONE HYDROCHLORIDE 5 MG/1
5 TABLET ORAL EVERY 4 HOURS PRN
Qty: 40 TABLET | Refills: 0 | Status: SHIPPED | OUTPATIENT
Start: 2018-09-06 | End: 2018-09-27 | Stop reason: ALTCHOICE

## 2018-09-06 RX ORDER — OXYCODONE HYDROCHLORIDE 5 MG/1
5 TABLET ORAL EVERY 4 HOURS PRN
Qty: 40 TABLET | Refills: 0 | Status: SHIPPED | OUTPATIENT
Start: 2018-09-06 | End: 2018-09-06 | Stop reason: SDUPTHER

## 2018-09-07 ENCOUNTER — OFFICE VISIT (OUTPATIENT)
Dept: ORTHOPEDICS | Age: 63
End: 2018-09-07

## 2018-09-07 ENCOUNTER — IMAGING SERVICES (OUTPATIENT)
Dept: GENERAL RADIOLOGY | Age: 63
End: 2018-09-07
Attending: PHYSICIAN ASSISTANT

## 2018-09-07 DIAGNOSIS — Z96.652 HISTORY OF TOTAL LEFT KNEE REPLACEMENT: Primary | ICD-10-CM

## 2018-09-07 DIAGNOSIS — M25.562 CHRONIC PAIN OF LEFT KNEE: ICD-10-CM

## 2018-09-07 DIAGNOSIS — Z96.652 HISTORY OF TOTAL LEFT KNEE REPLACEMENT: ICD-10-CM

## 2018-09-07 DIAGNOSIS — G89.29 CHRONIC PAIN OF LEFT KNEE: ICD-10-CM

## 2018-09-07 DIAGNOSIS — M54.32 SCIATICA OF LEFT SIDE: ICD-10-CM

## 2018-09-07 PROCEDURE — 73562 X-RAY EXAM OF KNEE 3: CPT | Performed by: RADIOLOGY

## 2018-09-07 PROCEDURE — 99024 POSTOP FOLLOW-UP VISIT: CPT | Performed by: ORTHOPAEDIC SURGERY

## 2018-09-07 RX ORDER — METHYLPREDNISOLONE 4 MG/1
4 TABLET ORAL SEE ADMIN INSTRUCTIONS
Qty: 21 TABLET | Refills: 0 | Status: SHIPPED | OUTPATIENT
Start: 2018-09-07 | End: 2018-09-27 | Stop reason: ALTCHOICE

## 2018-09-10 ENCOUNTER — HOSPITAL ENCOUNTER (OUTPATIENT)
Dept: REHABILITATION | Age: 63
Discharge: STILL ADMITTED OR ADMINISTRATIVE | End: 2018-09-10
Attending: ORTHOPAEDIC SURGERY

## 2018-09-10 PROCEDURE — 97140 MANUAL THERAPY 1/> REGIONS: CPT | Performed by: PHYSICAL THERAPIST

## 2018-09-10 PROCEDURE — 97110 THERAPEUTIC EXERCISES: CPT | Performed by: PHYSICAL THERAPIST

## 2018-09-10 PROCEDURE — 10004173 HB COUNTER-THERAPY VISIT PT: Performed by: PHYSICAL THERAPIST

## 2018-09-12 ENCOUNTER — OFFICE VISIT (OUTPATIENT)
Dept: CHIROPRACTIC MEDICINE | Age: 63
End: 2018-09-12

## 2018-09-12 ENCOUNTER — APPOINTMENT (OUTPATIENT)
Dept: GENERAL RADIOLOGY | Age: 63
End: 2018-09-12
Attending: PHYSICIAN ASSISTANT

## 2018-09-12 ENCOUNTER — APPOINTMENT (OUTPATIENT)
Dept: REHABILITATION | Age: 63
End: 2018-09-12
Attending: ORTHOPAEDIC SURGERY

## 2018-09-12 DIAGNOSIS — M54.50 ACUTE LEFT-SIDED LOW BACK PAIN WITHOUT SCIATICA: Primary | ICD-10-CM

## 2018-09-12 DIAGNOSIS — M99.05 PELVIC SOMATIC DYSFUNCTION: ICD-10-CM

## 2018-09-12 DIAGNOSIS — M99.04 SACRAL REGION SOMATIC DYSFUNCTION: ICD-10-CM

## 2018-09-12 DIAGNOSIS — M99.03 LUMBAR REGION SOMATIC DYSFUNCTION: ICD-10-CM

## 2018-09-12 PROCEDURE — 98941 CHIROPRACT MANJ 3-4 REGIONS: CPT | Performed by: CHIROPRACTOR

## 2018-09-14 ENCOUNTER — HOSPITAL ENCOUNTER (OUTPATIENT)
Dept: REHABILITATION | Age: 63
Discharge: STILL ADMITTED OR ADMINISTRATIVE | End: 2018-09-14
Attending: ORTHOPAEDIC SURGERY

## 2018-09-14 PROCEDURE — 97110 THERAPEUTIC EXERCISES: CPT | Performed by: PHYSICAL THERAPIST

## 2018-09-14 PROCEDURE — 10004173 HB COUNTER-THERAPY VISIT PT: Performed by: PHYSICAL THERAPIST

## 2018-09-14 PROCEDURE — 97116 GAIT TRAINING THERAPY: CPT | Performed by: PHYSICAL THERAPIST

## 2018-09-14 PROCEDURE — 97140 MANUAL THERAPY 1/> REGIONS: CPT | Performed by: PHYSICAL THERAPIST

## 2018-09-17 ENCOUNTER — HOSPITAL ENCOUNTER (OUTPATIENT)
Dept: REHABILITATION | Age: 63
Discharge: STILL ADMITTED OR ADMINISTRATIVE | End: 2018-09-17
Attending: ORTHOPAEDIC SURGERY

## 2018-09-17 PROCEDURE — 10004173 HB COUNTER-THERAPY VISIT PT: Performed by: PHYSICAL THERAPIST

## 2018-09-17 PROCEDURE — 97110 THERAPEUTIC EXERCISES: CPT | Performed by: PHYSICAL THERAPIST

## 2018-09-17 PROCEDURE — 97140 MANUAL THERAPY 1/> REGIONS: CPT | Performed by: PHYSICAL THERAPIST

## 2018-09-18 ENCOUNTER — APPOINTMENT (OUTPATIENT)
Dept: REHABILITATION | Age: 63
End: 2018-09-18
Attending: ORTHOPAEDIC SURGERY

## 2018-09-19 ENCOUNTER — OFFICE VISIT (OUTPATIENT)
Dept: CHIROPRACTIC MEDICINE | Age: 63
End: 2018-09-19

## 2018-09-19 ENCOUNTER — HOSPITAL ENCOUNTER (OUTPATIENT)
Dept: REHABILITATION | Age: 63
Discharge: STILL ADMITTED OR ADMINISTRATIVE | End: 2018-09-19
Attending: ORTHOPAEDIC SURGERY

## 2018-09-19 DIAGNOSIS — M54.2 CERVICALGIA: Primary | ICD-10-CM

## 2018-09-19 DIAGNOSIS — M99.01 SEGMENTAL AND SOMATIC DYSFUNCTION OF CERVICAL REGION: ICD-10-CM

## 2018-09-19 PROCEDURE — 98940 CHIROPRACT MANJ 1-2 REGIONS: CPT | Performed by: CHIROPRACTOR

## 2018-09-19 PROCEDURE — 10004173 HB COUNTER-THERAPY VISIT PT: Performed by: PHYSICAL THERAPIST

## 2018-09-19 PROCEDURE — 97110 THERAPEUTIC EXERCISES: CPT | Performed by: PHYSICAL THERAPIST

## 2018-09-19 PROCEDURE — 97140 MANUAL THERAPY 1/> REGIONS: CPT | Performed by: PHYSICAL THERAPIST

## 2018-09-21 ENCOUNTER — OFFICE VISIT (OUTPATIENT)
Dept: BEHAVIORAL HEALTH | Age: 63
End: 2018-09-21
Attending: PSYCHIATRY & NEUROLOGY

## 2018-09-21 ENCOUNTER — APPOINTMENT (OUTPATIENT)
Dept: REHABILITATION | Age: 63
End: 2018-09-21
Attending: ORTHOPAEDIC SURGERY

## 2018-09-21 DIAGNOSIS — G31.84 MILD COGNITIVE IMPAIRMENT: Primary | ICD-10-CM

## 2018-09-21 DIAGNOSIS — F41.9 ANXIETY AND DEPRESSION: ICD-10-CM

## 2018-09-21 DIAGNOSIS — G96.9 CNS DISORDER: ICD-10-CM

## 2018-09-21 DIAGNOSIS — F32.A ANXIETY AND DEPRESSION: ICD-10-CM

## 2018-09-25 ENCOUNTER — APPOINTMENT (OUTPATIENT)
Dept: REHABILITATION | Age: 63
End: 2018-09-25
Attending: ORTHOPAEDIC SURGERY

## 2018-09-26 ENCOUNTER — HOSPITAL ENCOUNTER (OUTPATIENT)
Dept: REHABILITATION | Age: 63
Discharge: STILL ADMITTED OR ADMINISTRATIVE | End: 2018-09-26
Attending: ORTHOPAEDIC SURGERY

## 2018-09-26 PROCEDURE — 10004173 HB COUNTER-THERAPY VISIT PT: Performed by: PHYSICAL THERAPIST

## 2018-09-26 PROCEDURE — 97140 MANUAL THERAPY 1/> REGIONS: CPT | Performed by: PHYSICAL THERAPIST

## 2018-09-26 PROCEDURE — 97110 THERAPEUTIC EXERCISES: CPT | Performed by: PHYSICAL THERAPIST

## 2018-09-27 ENCOUNTER — OFFICE VISIT (OUTPATIENT)
Dept: PULMONOLOGY | Age: 63
End: 2018-09-27

## 2018-09-27 VITALS
WEIGHT: 260 LBS | OXYGEN SATURATION: 96 % | HEART RATE: 78 BPM | DIASTOLIC BLOOD PRESSURE: 82 MMHG | SYSTOLIC BLOOD PRESSURE: 128 MMHG | BODY MASS INDEX: 43.32 KG/M2 | HEIGHT: 65 IN

## 2018-09-27 DIAGNOSIS — J45.50 SEVERE PERSISTENT ASTHMA WITHOUT COMPLICATION: Primary | ICD-10-CM

## 2018-09-27 DIAGNOSIS — J45.50 UNCONTROLLED SEVERE PERSISTENT ASTHMA: ICD-10-CM

## 2018-09-27 PROCEDURE — 96372 THER/PROPH/DIAG INJ SC/IM: CPT | Performed by: INTERNAL MEDICINE

## 2018-09-27 PROCEDURE — 99214 OFFICE O/P EST MOD 30 MIN: CPT | Performed by: INTERNAL MEDICINE

## 2018-09-27 RX ORDER — LORATADINE 10 MG/1
10 TABLET ORAL DAILY
Qty: 90 TABLET | Refills: 3 | Status: SHIPPED | OUTPATIENT
Start: 2018-09-27 | End: 2019-01-16 | Stop reason: ALTCHOICE

## 2018-09-28 ENCOUNTER — APPOINTMENT (OUTPATIENT)
Dept: REHABILITATION | Age: 63
End: 2018-09-28
Attending: ORTHOPAEDIC SURGERY

## 2018-09-28 ENCOUNTER — HOSPITAL ENCOUNTER (OUTPATIENT)
Dept: REHABILITATION | Age: 63
Discharge: STILL ADMITTED OR ADMINISTRATIVE | End: 2018-09-28
Attending: ORTHOPAEDIC SURGERY

## 2018-09-28 DIAGNOSIS — I20.1 PRINZMETAL ANGINA (CMD): ICD-10-CM

## 2018-09-28 DIAGNOSIS — I25.10 CORONARY ARTERY DISEASE INVOLVING NATIVE CORONARY ARTERY OF NATIVE HEART WITHOUT ANGINA PECTORIS: ICD-10-CM

## 2018-09-28 PROCEDURE — 10004173 HB COUNTER-THERAPY VISIT PT: Performed by: PHYSICAL THERAPIST

## 2018-09-28 PROCEDURE — 97116 GAIT TRAINING THERAPY: CPT | Performed by: PHYSICAL THERAPIST

## 2018-09-28 PROCEDURE — 97110 THERAPEUTIC EXERCISES: CPT | Performed by: PHYSICAL THERAPIST

## 2018-10-01 RX ORDER — TIZANIDINE 2 MG/1
TABLET ORAL
Qty: 200 TABLET | Refills: 1 | Status: ON HOLD | OUTPATIENT
Start: 2018-10-01 | End: 2018-10-27 | Stop reason: ALTCHOICE

## 2018-10-01 RX ORDER — POLYETHYLENE GLYCOL 3350 17 G/17G
POWDER, FOR SOLUTION ORAL
Qty: 3162 G | Refills: 0 | Status: SHIPPED | OUTPATIENT
Start: 2018-10-01 | End: 2019-02-16 | Stop reason: SDUPTHER

## 2018-10-01 RX ORDER — ISOSORBIDE DINITRATE 10 MG/1
10 TABLET ORAL 3 TIMES DAILY
Qty: 180 TABLET | Refills: 1 | Status: SHIPPED | OUTPATIENT
Start: 2018-10-01 | End: 2018-11-24 | Stop reason: SDUPTHER

## 2018-10-02 ENCOUNTER — NURSE TRIAGE (OUTPATIENT)
Dept: INTERNAL MEDICINE | Age: 63
End: 2018-10-02

## 2018-10-02 ENCOUNTER — OFFICE VISIT (OUTPATIENT)
Dept: BEHAVIORAL HEALTH | Age: 63
End: 2018-10-02

## 2018-10-02 DIAGNOSIS — G31.84 MCI (MILD COGNITIVE IMPAIRMENT): Primary | ICD-10-CM

## 2018-10-02 PROCEDURE — 96119 NEUROPSYCH TESTING W HEALTHCARE PROFESS PER HOUR TECH: CPT | Performed by: CLINICAL NEUROPSYCHOLOGIST

## 2018-10-02 PROCEDURE — 96118 NEUROPSYCH TESTING PER HOUR OF PSYCH OR PHYSICIAN TIME: CPT | Performed by: CLINICAL NEUROPSYCHOLOGIST

## 2018-10-03 ENCOUNTER — OFFICE VISIT (OUTPATIENT)
Dept: CHIROPRACTIC MEDICINE | Age: 63
End: 2018-10-03

## 2018-10-03 ENCOUNTER — APPOINTMENT (OUTPATIENT)
Dept: REHABILITATION | Age: 63
End: 2018-10-03
Attending: ORTHOPAEDIC SURGERY

## 2018-10-03 DIAGNOSIS — M99.04 SACRAL REGION SOMATIC DYSFUNCTION: ICD-10-CM

## 2018-10-03 DIAGNOSIS — M54.42 ACUTE BILATERAL LOW BACK PAIN WITH BILATERAL SCIATICA: Primary | ICD-10-CM

## 2018-10-03 DIAGNOSIS — M54.41 ACUTE BILATERAL LOW BACK PAIN WITH BILATERAL SCIATICA: Primary | ICD-10-CM

## 2018-10-03 DIAGNOSIS — M99.03 SEGMENTAL AND SOMATIC DYSFUNCTION OF LUMBAR REGION: ICD-10-CM

## 2018-10-03 DIAGNOSIS — M99.05 SEGMENTAL AND SOMATIC DYSFUNCTION OF PELVIC REGION: ICD-10-CM

## 2018-10-03 PROCEDURE — 98941 CHIROPRACT MANJ 3-4 REGIONS: CPT | Performed by: CHIROPRACTOR

## 2018-10-03 RX ORDER — LEVOFLOXACIN 500 MG/1
500 TABLET, FILM COATED ORAL DAILY
Qty: 5 TABLET | Refills: 0 | Status: SHIPPED | OUTPATIENT
Start: 2018-10-03 | End: 2018-10-08

## 2018-10-05 ENCOUNTER — APPOINTMENT (OUTPATIENT)
Dept: REHABILITATION | Age: 63
End: 2018-10-05
Attending: ORTHOPAEDIC SURGERY

## 2018-10-08 ENCOUNTER — WALK IN (OUTPATIENT)
Dept: URGENT CARE | Age: 63
End: 2018-10-08

## 2018-10-08 DIAGNOSIS — J01.90 ACUTE NON-RECURRENT SINUSITIS, UNSPECIFIED LOCATION: Primary | ICD-10-CM

## 2018-10-08 DIAGNOSIS — J20.9 ACUTE BRONCHITIS, UNSPECIFIED ORGANISM: ICD-10-CM

## 2018-10-08 DIAGNOSIS — Z87.01 HISTORY OF PNEUMONIA: ICD-10-CM

## 2018-10-08 DIAGNOSIS — R06.2 WHEEZING: ICD-10-CM

## 2018-10-08 PROCEDURE — 99214 OFFICE O/P EST MOD 30 MIN: CPT | Performed by: NURSE PRACTITIONER

## 2018-10-08 RX ORDER — PREDNISONE 20 MG/1
TABLET ORAL
Qty: 30 TABLET | Refills: 0 | Status: ON HOLD | OUTPATIENT
Start: 2018-10-08 | End: 2018-10-27 | Stop reason: ALTCHOICE

## 2018-10-08 RX ORDER — DOXYCYCLINE HYCLATE 100 MG
100 TABLET ORAL 2 TIMES DAILY
Qty: 20 TABLET | Refills: 0 | Status: SHIPPED | OUTPATIENT
Start: 2018-10-08 | End: 2018-10-18

## 2018-10-08 ASSESSMENT — ENCOUNTER SYMPTOMS
COUGH: 1
VOMITING: 0
FEVER: 0
SINUS PAIN: 1
EYE DISCHARGE: 0
EYE REDNESS: 0
SINUS PRESSURE: 1
SORE THROAT: 0
SHORTNESS OF BREATH: 1
NAUSEA: 0
EYE PAIN: 0
ABDOMINAL PAIN: 0
WHEEZING: 1
HEADACHES: 1

## 2018-10-08 ASSESSMENT — PAIN SCALES - GENERAL: PAINLEVEL: 5-6

## 2018-10-09 ENCOUNTER — APPOINTMENT (OUTPATIENT)
Dept: REHABILITATION | Age: 63
End: 2018-10-09
Attending: ORTHOPAEDIC SURGERY

## 2018-10-10 ENCOUNTER — OFFICE VISIT (OUTPATIENT)
Dept: CHIROPRACTIC MEDICINE | Age: 63
End: 2018-10-10

## 2018-10-10 DIAGNOSIS — M99.04 SACRAL REGION SOMATIC DYSFUNCTION: ICD-10-CM

## 2018-10-10 DIAGNOSIS — M99.03 LUMBAR REGION SOMATIC DYSFUNCTION: ICD-10-CM

## 2018-10-10 DIAGNOSIS — M99.05 SEGMENTAL AND SOMATIC DYSFUNCTION OF PELVIC REGION: ICD-10-CM

## 2018-10-10 DIAGNOSIS — M54.50 ACUTE LEFT-SIDED LOW BACK PAIN WITHOUT SCIATICA: Primary | ICD-10-CM

## 2018-10-10 DIAGNOSIS — M99.05 PELVIC SOMATIC DYSFUNCTION: ICD-10-CM

## 2018-10-10 PROCEDURE — 98940 CHIROPRACT MANJ 1-2 REGIONS: CPT | Performed by: CHIROPRACTOR

## 2018-10-12 ENCOUNTER — TELEPHONE (OUTPATIENT)
Dept: INTERNAL MEDICINE | Age: 63
End: 2018-10-12

## 2018-10-12 ENCOUNTER — OFFICE VISIT (OUTPATIENT)
Dept: CHIROPRACTIC MEDICINE | Age: 63
End: 2018-10-12

## 2018-10-12 ENCOUNTER — NURSE TRIAGE (OUTPATIENT)
Dept: INTERNAL MEDICINE | Age: 63
End: 2018-10-12

## 2018-10-12 ENCOUNTER — LAB SERVICES (OUTPATIENT)
Dept: LAB | Age: 63
End: 2018-10-12

## 2018-10-12 ENCOUNTER — IMAGING SERVICES (OUTPATIENT)
Dept: GENERAL RADIOLOGY | Age: 63
End: 2018-10-12
Attending: INTERNAL MEDICINE

## 2018-10-12 ENCOUNTER — APPOINTMENT (OUTPATIENT)
Dept: REHABILITATION | Age: 63
End: 2018-10-12
Attending: ORTHOPAEDIC SURGERY

## 2018-10-12 DIAGNOSIS — J20.8 ACUTE BACTERIAL BRONCHITIS: ICD-10-CM

## 2018-10-12 DIAGNOSIS — M54.41 ACUTE BILATERAL LOW BACK PAIN WITH BILATERAL SCIATICA: ICD-10-CM

## 2018-10-12 DIAGNOSIS — J45.909 INTRINSIC ASTHMA: ICD-10-CM

## 2018-10-12 DIAGNOSIS — J98.01 ACUTE BRONCHOSPASM: ICD-10-CM

## 2018-10-12 DIAGNOSIS — M25.512 ACUTE PAIN OF LEFT SHOULDER: Primary | ICD-10-CM

## 2018-10-12 DIAGNOSIS — J98.01 ACUTE BRONCHOSPASM: Primary | ICD-10-CM

## 2018-10-12 DIAGNOSIS — B96.89 ACUTE BACTERIAL BRONCHITIS: ICD-10-CM

## 2018-10-12 DIAGNOSIS — M99.05 SEGMENTAL AND SOMATIC DYSFUNCTION OF PELVIC REGION: ICD-10-CM

## 2018-10-12 DIAGNOSIS — M54.42 ACUTE BILATERAL LOW BACK PAIN WITH BILATERAL SCIATICA: ICD-10-CM

## 2018-10-12 DIAGNOSIS — M99.07 SEGMENTAL AND SOMATIC DYSFUNCTION OF UPPER EXTREMITY: ICD-10-CM

## 2018-10-12 PROCEDURE — 98943 CHIROPRACT MANJ XTRSPINL 1/>: CPT | Performed by: CHIROPRACTOR

## 2018-10-12 PROCEDURE — 87070 CULTURE OTHR SPECIMN AEROBIC: CPT | Performed by: INTERNAL MEDICINE

## 2018-10-12 PROCEDURE — 87205 SMEAR GRAM STAIN: CPT | Performed by: INTERNAL MEDICINE

## 2018-10-12 PROCEDURE — 71046 X-RAY EXAM CHEST 2 VIEWS: CPT | Performed by: RADIOLOGY

## 2018-10-15 ENCOUNTER — APPOINTMENT (OUTPATIENT)
Dept: REHABILITATION | Age: 63
End: 2018-10-15
Attending: ORTHOPAEDIC SURGERY

## 2018-10-15 ENCOUNTER — TELEPHONE (OUTPATIENT)
Dept: INTERNAL MEDICINE | Age: 63
End: 2018-10-15

## 2018-10-16 RX ORDER — CLARITHROMYCIN 500 MG/1
500 TABLET, COATED ORAL 2 TIMES DAILY
Qty: 20 TABLET | Refills: 0 | Status: ON HOLD | OUTPATIENT
Start: 2018-10-16 | End: 2018-10-30 | Stop reason: HOSPADM

## 2018-10-17 ENCOUNTER — APPOINTMENT (OUTPATIENT)
Dept: REHABILITATION | Age: 63
End: 2018-10-17
Attending: ORTHOPAEDIC SURGERY

## 2018-10-22 ENCOUNTER — E-ADVICE (OUTPATIENT)
Dept: ALLERGY | Age: 63
End: 2018-10-22

## 2018-10-23 ENCOUNTER — OFFICE VISIT (OUTPATIENT)
Dept: NEUROLOGY | Age: 63
End: 2018-10-23

## 2018-10-23 ENCOUNTER — TELEPHONE (OUTPATIENT)
Dept: NEUROLOGY | Age: 63
End: 2018-10-23

## 2018-10-23 ENCOUNTER — TELEPHONE (OUTPATIENT)
Dept: INTERNAL MEDICINE | Age: 63
End: 2018-10-23

## 2018-10-23 VITALS
SYSTOLIC BLOOD PRESSURE: 117 MMHG | WEIGHT: 260 LBS | HEART RATE: 77 BPM | BODY MASS INDEX: 43.27 KG/M2 | DIASTOLIC BLOOD PRESSURE: 60 MMHG

## 2018-10-23 DIAGNOSIS — G31.84 MILD COGNITIVE IMPAIRMENT WITH MEMORY LOSS: ICD-10-CM

## 2018-10-23 DIAGNOSIS — G43.009 MIGRAINE WITHOUT AURA AND WITHOUT STATUS MIGRAINOSUS, NOT INTRACTABLE: Primary | ICD-10-CM

## 2018-10-23 DIAGNOSIS — R25.1 TREMOR: ICD-10-CM

## 2018-10-23 PROCEDURE — 99214 OFFICE O/P EST MOD 30 MIN: CPT | Performed by: PSYCHIATRY & NEUROLOGY

## 2018-10-23 RX ORDER — FLUCONAZOLE 100 MG/1
100 TABLET ORAL DAILY
Qty: 7 TABLET | Refills: 0 | Status: SHIPPED | OUTPATIENT
Start: 2018-10-23 | End: 2018-11-06 | Stop reason: SDUPTHER

## 2018-10-23 RX ORDER — GABAPENTIN 100 MG/1
200 CAPSULE ORAL 2 TIMES DAILY
Qty: 360 CAPSULE | Refills: 3 | Status: SHIPPED | OUTPATIENT
Start: 2018-10-23 | End: 2018-12-26 | Stop reason: DRUGHIGH

## 2018-10-23 RX ORDER — GABAPENTIN 300 MG/1
300 CAPSULE ORAL NIGHTLY
Qty: 180 CAPSULE | Refills: 3 | Status: SHIPPED | OUTPATIENT
Start: 2018-10-23 | End: 2019-02-25 | Stop reason: SDUPTHER

## 2018-10-23 RX ORDER — BACLOFEN 10 MG/1
10 TABLET ORAL 3 TIMES DAILY PRN
Qty: 90 TABLET | Refills: 11 | Status: SHIPPED | OUTPATIENT
Start: 2018-10-23 | End: 2018-12-26 | Stop reason: ALTCHOICE

## 2018-10-24 ENCOUNTER — E-ADVICE (OUTPATIENT)
Dept: INTERNAL MEDICINE | Age: 63
End: 2018-10-24

## 2018-10-24 ENCOUNTER — APPOINTMENT (OUTPATIENT)
Dept: REHABILITATION | Age: 63
End: 2018-10-24
Attending: ORTHOPAEDIC SURGERY

## 2018-10-24 RX ORDER — PREDNISONE 20 MG/1
TABLET ORAL
Qty: 9 TABLET | Refills: 3 | Status: ON HOLD | OUTPATIENT
Start: 2018-10-24 | End: 2019-02-25 | Stop reason: HOSPADM

## 2018-10-24 RX ORDER — CLARITHROMYCIN 500 MG/1
500 TABLET, COATED ORAL 2 TIMES DAILY
Qty: 20 TABLET | Refills: 0 | Status: SHIPPED | OUTPATIENT
Start: 2018-10-24 | End: 2018-11-28 | Stop reason: SDUPTHER

## 2018-10-25 ENCOUNTER — NURSE ONLY (OUTPATIENT)
Dept: PULMONOLOGY | Age: 63
End: 2018-10-25

## 2018-10-25 PROCEDURE — 96372 THER/PROPH/DIAG INJ SC/IM: CPT | Performed by: INTERNAL MEDICINE

## 2018-10-25 RX ORDER — PREDNISONE 10 MG/1
TABLET ORAL
Qty: 28 TABLET | Refills: 0 | Status: ON HOLD | OUTPATIENT
Start: 2018-10-25 | End: 2018-10-31 | Stop reason: HOSPADM

## 2018-10-26 ENCOUNTER — OFFICE VISIT (OUTPATIENT)
Dept: CHIROPRACTIC MEDICINE | Age: 63
End: 2018-10-26

## 2018-10-26 ENCOUNTER — APPOINTMENT (OUTPATIENT)
Dept: REHABILITATION | Age: 63
End: 2018-10-26
Attending: ORTHOPAEDIC SURGERY

## 2018-10-26 DIAGNOSIS — G89.29 CHRONIC BILATERAL LOW BACK PAIN WITHOUT SCIATICA: Primary | ICD-10-CM

## 2018-10-26 DIAGNOSIS — M54.50 CHRONIC BILATERAL LOW BACK PAIN WITHOUT SCIATICA: Primary | ICD-10-CM

## 2018-10-26 DIAGNOSIS — M99.03 LUMBAR REGION SOMATIC DYSFUNCTION: ICD-10-CM

## 2018-10-26 DIAGNOSIS — M99.05 PELVIC SOMATIC DYSFUNCTION: ICD-10-CM

## 2018-10-26 DIAGNOSIS — M99.04 SACRAL REGION SOMATIC DYSFUNCTION: ICD-10-CM

## 2018-10-26 PROCEDURE — 98941 CHIROPRACT MANJ 3-4 REGIONS: CPT | Performed by: CHIROPRACTOR

## 2018-10-27 ENCOUNTER — HOSPITAL ENCOUNTER (INPATIENT)
Age: 63
LOS: 4 days | Discharge: HOME | DRG: 202 | End: 2018-10-31
Attending: EMERGENCY MEDICINE | Admitting: INTERNAL MEDICINE

## 2018-10-27 ENCOUNTER — APPOINTMENT (OUTPATIENT)
Dept: GENERAL RADIOLOGY | Age: 63
DRG: 202 | End: 2018-10-27
Attending: EMERGENCY MEDICINE

## 2018-10-27 DIAGNOSIS — J20.9 ACUTE BRONCHITIS, UNSPECIFIED ORGANISM: ICD-10-CM

## 2018-10-27 DIAGNOSIS — J45.41 MODERATE PERSISTENT ASTHMA WITH ACUTE EXACERBATION: Primary | ICD-10-CM

## 2018-10-27 DIAGNOSIS — R09.02 HYPOXIA: ICD-10-CM

## 2018-10-27 LAB
ALBUMIN SERPL-MCNC: 3.5 G/DL (ref 3.6–5.1)
ALBUMIN/GLOB SERPL: 0.9 {RATIO} (ref 1–2.4)
ALP SERPL-CCNC: 72 UNITS/L (ref 45–117)
ALT SERPL-CCNC: 38 UNITS/L
ANION GAP SERPL CALC-SCNC: 11 MMOL/L (ref 10–20)
APPEARANCE UR: CLEAR
APTT PPP: 23 SEC (ref 22–30)
AST SERPL-CCNC: 31 UNITS/L
ATRIAL RATE (BPM): 88
BACTERIA #/AREA URNS HPF: ABNORMAL /HPF
BASOPHILS # BLD AUTO: 0 K/MCL (ref 0–0.3)
BASOPHILS NFR BLD AUTO: 0 %
BILIRUB SERPL-MCNC: 0.8 MG/DL (ref 0.2–1)
BILIRUB UR QL STRIP: NEGATIVE
BUN SERPL-MCNC: 19 MG/DL (ref 6–20)
BUN/CREAT SERPL: 16 (ref 7–25)
CALCIUM SERPL-MCNC: 8 MG/DL (ref 8.4–10.2)
CHLORIDE SERPL-SCNC: 94 MMOL/L (ref 98–107)
CO2 SERPL-SCNC: 29 MMOL/L (ref 21–32)
COLOR UR: YELLOW
CREAT SERPL-MCNC: 1.17 MG/DL (ref 0.51–0.95)
DIFFERENTIAL METHOD BLD: ABNORMAL
EOSINOPHIL # BLD AUTO: 0 K/MCL (ref 0.1–0.5)
EOSINOPHIL NFR SPEC: 0 %
ERYTHROCYTE [DISTWIDTH] IN BLOOD: 13.6 % (ref 11–15)
GLOBULIN SER-MCNC: 4 G/DL (ref 2–4)
GLUCOSE BLDC GLUCOMTR-MCNC: 159 MG/DL (ref 65–99)
GLUCOSE BLDC GLUCOMTR-MCNC: 305 MG/DL (ref 65–99)
GLUCOSE BLDC GLUCOMTR-MCNC: 376 MG/DL (ref 65–99)
GLUCOSE SERPL-MCNC: 157 MG/DL (ref 65–99)
GLUCOSE UR STRIP-MCNC: >500 MG/DL
HCT VFR BLD CALC: 34.8 % (ref 36–46.5)
HGB BLD-MCNC: 11.6 G/DL (ref 12–15.5)
HGB UR QL STRIP: ABNORMAL
HYALINE CASTS #/AREA URNS LPF: ABNORMAL /LPF (ref 0–5)
INR PPP: 1
KETONES UR STRIP-MCNC: NEGATIVE MG/DL
LACTATE BLD-MCNC: 1.2 MMOL/L
LEUKOCYTE ESTERASE UR QL STRIP: NEGATIVE
LYMPHOCYTES # BLD MANUAL: 0.9 K/MCL (ref 1–4)
LYMPHOCYTES NFR BLD MANUAL: 7 %
MCH RBC QN AUTO: 30.3 PG (ref 26–34)
MCHC RBC AUTO-ENTMCNC: 33.3 G/DL (ref 32–36.5)
MCV RBC AUTO: 90.9 FL (ref 78–100)
MONOCYTES # BLD MANUAL: 0.5 K/MCL (ref 0.3–0.9)
MONOCYTES NFR BLD MANUAL: 4 %
NEUTROPHILS # BLD: 10.3 K/MCL (ref 1.8–7.7)
NEUTROPHILS NFR BLD AUTO: 89 %
NITRITE UR QL STRIP: NEGATIVE
NT-PROBNP SERPL-MCNC: 5459 PG/ML
P AXIS (DEGREES): 64
PH UR STRIP: 5 UNITS (ref 5–7)
PLATELET # BLD: 185 K/MCL (ref 140–450)
POTASSIUM SERPL-SCNC: 4 MMOL/L (ref 3.4–5.1)
PR-INTERVAL (MSEC): 200
PROCALCITONIN SERPL-MCNC: <0.05 NG/ML
PROT SERPL-MCNC: 7.5 G/DL (ref 6.4–8.2)
PROT UR STRIP-MCNC: NEGATIVE MG/DL
PROTHROMBIN TIME: 9.9 SEC (ref 9.7–11.8)
QRS-INTERVAL (MSEC): 82
QT-INTERVAL (MSEC): 334
QTC: 404
R AXIS (DEGREES): 27
RBC # BLD: 3.83 MIL/MCL (ref 4–5.2)
RBC #/AREA URNS HPF: ABNORMAL /HPF (ref 0–3)
REPORT TEXT: NORMAL
SODIUM SERPL-SCNC: 130 MMOL/L (ref 135–145)
SP GR UR STRIP: <1.005 (ref 1–1.03)
SPECIMEN SOURCE: ABNORMAL
SQUAMOUS #/AREA URNS HPF: ABNORMAL /HPF (ref 0–5)
T AXIS (DEGREES): 24
TROPONIN I SERPL-MCNC: 0.03 NG/ML
UROBILINOGEN UR STRIP-MCNC: 0.2 MG/DL (ref 0–1)
VENTRICULAR RATE EKG/MIN (BPM): 88
WBC # BLD: 11.7 K/MCL (ref 4.2–11)
WBC #/AREA URNS HPF: ABNORMAL /HPF (ref 0–5)

## 2018-10-27 PROCEDURE — 36415 COLL VENOUS BLD VENIPUNCTURE: CPT

## 2018-10-27 PROCEDURE — 10004651 HB RX, NO CHARGE ITEM: Performed by: INTERNAL MEDICINE

## 2018-10-27 PROCEDURE — 10000002 HB ROOM CHARGE MED SURG

## 2018-10-27 PROCEDURE — 99285 EMERGENCY DEPT VISIT HI MDM: CPT

## 2018-10-27 PROCEDURE — 80053 COMPREHEN METABOLIC PANEL: CPT

## 2018-10-27 PROCEDURE — 85025 COMPLETE CBC W/AUTO DIFF WBC: CPT

## 2018-10-27 PROCEDURE — 83880 ASSAY OF NATRIURETIC PEPTIDE: CPT

## 2018-10-27 PROCEDURE — 10002800 HB RX 250 W HCPCS: Performed by: INTERNAL MEDICINE

## 2018-10-27 PROCEDURE — 10003445 HB TELEMETRY PER DAY

## 2018-10-27 PROCEDURE — 99220 INITIAL OBSERVATION CARE,LEVL III: CPT | Performed by: INTERNAL MEDICINE

## 2018-10-27 PROCEDURE — 81001 URINALYSIS AUTO W/SCOPE: CPT

## 2018-10-27 PROCEDURE — 87040 BLOOD CULTURE FOR BACTERIA: CPT

## 2018-10-27 PROCEDURE — 10002801 HB RX 250 W/O HCPCS: Performed by: INTERNAL MEDICINE

## 2018-10-27 PROCEDURE — 96372 THER/PROPH/DIAG INJ SC/IM: CPT | Performed by: INTERNAL MEDICINE

## 2018-10-27 PROCEDURE — 93005 ELECTROCARDIOGRAM TRACING: CPT | Performed by: EMERGENCY MEDICINE

## 2018-10-27 PROCEDURE — 99285 EMERGENCY DEPT VISIT HI MDM: CPT | Performed by: EMERGENCY MEDICINE

## 2018-10-27 PROCEDURE — 96361 HYDRATE IV INFUSION ADD-ON: CPT

## 2018-10-27 PROCEDURE — 10002807 HB RX 258: Performed by: INTERNAL MEDICINE

## 2018-10-27 PROCEDURE — 94640 AIRWAY INHALATION TREATMENT: CPT

## 2018-10-27 PROCEDURE — 87633 RESP VIRUS 12-25 TARGETS: CPT

## 2018-10-27 PROCEDURE — 10002800 HB RX 250 W HCPCS: Performed by: EMERGENCY MEDICINE

## 2018-10-27 PROCEDURE — 82962 GLUCOSE BLOOD TEST: CPT

## 2018-10-27 PROCEDURE — 93010 ELECTROCARDIOGRAM REPORT: CPT | Performed by: INTERNAL MEDICINE

## 2018-10-27 PROCEDURE — 96365 THER/PROPH/DIAG IV INF INIT: CPT

## 2018-10-27 PROCEDURE — 83036 HEMOGLOBIN GLYCOSYLATED A1C: CPT

## 2018-10-27 PROCEDURE — 10002803 HB RX 637: Performed by: INTERNAL MEDICINE

## 2018-10-27 PROCEDURE — G0378 HOSPITAL OBSERVATION PER HR: HCPCS

## 2018-10-27 PROCEDURE — 96375 TX/PRO/DX INJ NEW DRUG ADDON: CPT

## 2018-10-27 PROCEDURE — 71045 X-RAY EXAM CHEST 1 VIEW: CPT

## 2018-10-27 PROCEDURE — 85610 PROTHROMBIN TIME: CPT

## 2018-10-27 PROCEDURE — 10002807 HB RX 258: Performed by: EMERGENCY MEDICINE

## 2018-10-27 PROCEDURE — 83605 ASSAY OF LACTIC ACID: CPT

## 2018-10-27 PROCEDURE — 10002801 HB RX 250 W/O HCPCS: Performed by: EMERGENCY MEDICINE

## 2018-10-27 PROCEDURE — 85730 THROMBOPLASTIN TIME PARTIAL: CPT

## 2018-10-27 PROCEDURE — 84145 PROCALCITONIN (PCT): CPT

## 2018-10-27 PROCEDURE — 71045 X-RAY EXAM CHEST 1 VIEW: CPT | Performed by: RADIOLOGY

## 2018-10-27 PROCEDURE — 84484 ASSAY OF TROPONIN QUANT: CPT

## 2018-10-27 RX ORDER — FLUCONAZOLE 100 MG/1
100 TABLET ORAL DAILY
Status: DISCONTINUED | OUTPATIENT
Start: 2018-10-27 | End: 2018-10-27

## 2018-10-27 RX ORDER — GABAPENTIN 100 MG/1
200 CAPSULE ORAL EVERY 12 HOURS SCHEDULED
Status: DISCONTINUED | OUTPATIENT
Start: 2018-10-27 | End: 2018-10-31 | Stop reason: HOSPADM

## 2018-10-27 RX ORDER — BENZONATATE 100 MG/1
100-200 CAPSULE ORAL 3 TIMES DAILY PRN
Status: DISCONTINUED | OUTPATIENT
Start: 2018-10-27 | End: 2018-10-31 | Stop reason: HOSPADM

## 2018-10-27 RX ORDER — GABAPENTIN 300 MG/1
300 CAPSULE ORAL NIGHTLY
Status: DISCONTINUED | OUTPATIENT
Start: 2018-10-27 | End: 2018-10-31 | Stop reason: HOSPADM

## 2018-10-27 RX ORDER — BUPROPION HYDROCHLORIDE 150 MG/1
300 TABLET ORAL DAILY
Status: DISCONTINUED | OUTPATIENT
Start: 2018-10-28 | End: 2018-10-31 | Stop reason: HOSPADM

## 2018-10-27 RX ORDER — TIZANIDINE 2 MG/1
2 TABLET ORAL EVERY 8 HOURS PRN
Status: DISCONTINUED | OUTPATIENT
Start: 2018-10-27 | End: 2018-10-31 | Stop reason: HOSPADM

## 2018-10-27 RX ORDER — POTASSIUM CHLORIDE 20 MEQ/1
40 TABLET, EXTENDED RELEASE ORAL EVERY 4 HOURS PRN
Status: DISCONTINUED | OUTPATIENT
Start: 2018-10-27 | End: 2018-10-31 | Stop reason: HOSPADM

## 2018-10-27 RX ORDER — ALBUTEROL SULFATE 2.5 MG/3ML
12.5 SOLUTION RESPIRATORY (INHALATION) ONCE
Status: COMPLETED | OUTPATIENT
Start: 2018-10-27 | End: 2018-10-27

## 2018-10-27 RX ORDER — BACLOFEN 10 MG/1
10 TABLET ORAL 3 TIMES DAILY PRN
Status: DISCONTINUED | OUTPATIENT
Start: 2018-10-27 | End: 2018-10-31 | Stop reason: HOSPADM

## 2018-10-27 RX ORDER — TRAMADOL HYDROCHLORIDE 50 MG/1
50 TABLET ORAL EVERY 6 HOURS PRN
Status: DISCONTINUED | OUTPATIENT
Start: 2018-10-27 | End: 2018-10-31 | Stop reason: HOSPADM

## 2018-10-27 RX ORDER — MAGNESIUM SULFATE 1 G/100ML
1 INJECTION INTRAVENOUS ONCE
Status: COMPLETED | OUTPATIENT
Start: 2018-10-27 | End: 2018-10-27

## 2018-10-27 RX ORDER — AMMONIUM LACTATE 12 G/100G
CREAM TOPICAL 2 TIMES DAILY PRN
Status: DISCONTINUED | OUTPATIENT
Start: 2018-10-27 | End: 2018-10-31 | Stop reason: HOSPADM

## 2018-10-27 RX ORDER — NITROGLYCERIN 0.4 MG/1
0.4 TABLET SUBLINGUAL EVERY 5 MIN PRN
Status: DISCONTINUED | OUTPATIENT
Start: 2018-10-27 | End: 2018-10-31 | Stop reason: HOSPADM

## 2018-10-27 RX ORDER — IPRATROPIUM BROMIDE AND ALBUTEROL SULFATE 2.5; .5 MG/3ML; MG/3ML
3 SOLUTION RESPIRATORY (INHALATION)
Status: DISCONTINUED | OUTPATIENT
Start: 2018-10-27 | End: 2018-10-31 | Stop reason: HOSPADM

## 2018-10-27 RX ORDER — IPRATROPIUM BROMIDE AND ALBUTEROL SULFATE 2.5; .5 MG/3ML; MG/3ML
3 SOLUTION RESPIRATORY (INHALATION) ONCE
Status: COMPLETED | OUTPATIENT
Start: 2018-10-27 | End: 2018-10-27

## 2018-10-27 RX ORDER — PROCHLORPERAZINE MALEATE 5 MG/1
5 TABLET ORAL EVERY 4 HOURS PRN
Status: DISCONTINUED | OUTPATIENT
Start: 2018-10-27 | End: 2018-10-31 | Stop reason: HOSPADM

## 2018-10-27 RX ORDER — MAGNESIUM SULFATE 1 G/100ML
1 INJECTION INTRAVENOUS DAILY PRN
Status: DISCONTINUED | OUTPATIENT
Start: 2018-10-27 | End: 2018-10-31 | Stop reason: HOSPADM

## 2018-10-27 RX ORDER — PANTOPRAZOLE SODIUM 40 MG/1
40 TABLET, DELAYED RELEASE ORAL
Status: DISCONTINUED | OUTPATIENT
Start: 2018-10-27 | End: 2018-10-31 | Stop reason: HOSPADM

## 2018-10-27 RX ORDER — DULOXETIN HYDROCHLORIDE 60 MG/1
60 CAPSULE, DELAYED RELEASE ORAL 2 TIMES DAILY
Status: DISCONTINUED | OUTPATIENT
Start: 2018-10-27 | End: 2018-10-31 | Stop reason: HOSPADM

## 2018-10-27 RX ORDER — BUSPIRONE HYDROCHLORIDE 15 MG/1
30 TABLET ORAL 2 TIMES DAILY
Status: DISCONTINUED | OUTPATIENT
Start: 2018-10-27 | End: 2018-10-31 | Stop reason: HOSPADM

## 2018-10-27 RX ORDER — DEXTROSE MONOHYDRATE 25 G/50ML
25 INJECTION, SOLUTION INTRAVENOUS PRN
Status: DISCONTINUED | OUTPATIENT
Start: 2018-10-27 | End: 2018-10-31 | Stop reason: HOSPADM

## 2018-10-27 RX ORDER — 0.9 % SODIUM CHLORIDE 0.9 %
2 VIAL (ML) INJECTION EVERY 12 HOURS SCHEDULED
Status: DISCONTINUED | OUTPATIENT
Start: 2018-10-27 | End: 2018-10-31 | Stop reason: HOSPADM

## 2018-10-27 RX ORDER — NICOTINE POLACRILEX 4 MG
15 LOZENGE BUCCAL PRN
Status: DISCONTINUED | OUTPATIENT
Start: 2018-10-27 | End: 2018-10-31 | Stop reason: HOSPADM

## 2018-10-27 RX ORDER — POTASSIUM CHLORIDE 20 MEQ/1
20 TABLET, EXTENDED RELEASE ORAL EVERY 4 HOURS PRN
Status: DISCONTINUED | OUTPATIENT
Start: 2018-10-27 | End: 2018-10-31 | Stop reason: HOSPADM

## 2018-10-27 RX ORDER — BISACODYL 10 MG
10 SUPPOSITORY, RECTAL RECTAL DAILY PRN
Status: DISCONTINUED | OUTPATIENT
Start: 2018-10-27 | End: 2018-10-31 | Stop reason: HOSPADM

## 2018-10-27 RX ORDER — HYDROXYZINE HYDROCHLORIDE 25 MG/1
75 TABLET, FILM COATED ORAL
Status: DISCONTINUED | OUTPATIENT
Start: 2018-10-27 | End: 2018-10-31 | Stop reason: HOSPADM

## 2018-10-27 RX ORDER — METHYLPREDNISOLONE SODIUM SUCCINATE 125 MG/2ML
125 INJECTION, POWDER, LYOPHILIZED, FOR SOLUTION INTRAMUSCULAR; INTRAVENOUS ONCE
Status: COMPLETED | OUTPATIENT
Start: 2018-10-27 | End: 2018-10-27

## 2018-10-27 RX ORDER — ACETAMINOPHEN 325 MG/1
650 TABLET ORAL EVERY 4 HOURS PRN
Status: DISCONTINUED | OUTPATIENT
Start: 2018-10-27 | End: 2018-10-31 | Stop reason: HOSPADM

## 2018-10-27 RX ORDER — ISOSORBIDE DINITRATE 10 MG/1
10 TABLET ORAL 3 TIMES DAILY
Status: DISCONTINUED | OUTPATIENT
Start: 2018-10-27 | End: 2018-10-31 | Stop reason: HOSPADM

## 2018-10-27 RX ORDER — DEXTROSE MONOHYDRATE 50 MG/ML
INJECTION, SOLUTION INTRAVENOUS CONTINUOUS PRN
Status: DISCONTINUED | OUTPATIENT
Start: 2018-10-27 | End: 2018-10-31 | Stop reason: HOSPADM

## 2018-10-27 RX ORDER — METHYLPREDNISOLONE SODIUM SUCCINATE 125 MG/2ML
60 INJECTION, POWDER, LYOPHILIZED, FOR SOLUTION INTRAMUSCULAR; INTRAVENOUS 2 TIMES DAILY
Status: DISCONTINUED | OUTPATIENT
Start: 2018-10-27 | End: 2018-10-30

## 2018-10-27 RX ORDER — 0.9 % SODIUM CHLORIDE 0.9 %
2 VIAL (ML) INJECTION PRN
Status: DISCONTINUED | OUTPATIENT
Start: 2018-10-27 | End: 2018-10-31 | Stop reason: HOSPADM

## 2018-10-27 RX ORDER — POTASSIUM CHLORIDE 1.5 G/1.58G
20 POWDER, FOR SOLUTION ORAL EVERY 4 HOURS PRN
Status: DISCONTINUED | OUTPATIENT
Start: 2018-10-27 | End: 2018-10-31 | Stop reason: HOSPADM

## 2018-10-27 RX ORDER — ENOXAPARIN SODIUM 100 MG/ML
40 INJECTION SUBCUTANEOUS DAILY
Status: DISCONTINUED | OUTPATIENT
Start: 2018-10-27 | End: 2018-10-31 | Stop reason: HOSPADM

## 2018-10-27 RX ORDER — FLUTICASONE PROPIONATE 50 MCG
2 SPRAY, SUSPENSION (ML) NASAL DAILY
Status: DISCONTINUED | OUTPATIENT
Start: 2018-10-28 | End: 2018-10-31 | Stop reason: HOSPADM

## 2018-10-27 RX ORDER — FLUTICASONE PROPIONATE AND SALMETEROL 500; 50 UG/1; UG/1
1 POWDER RESPIRATORY (INHALATION)
Status: DISCONTINUED | OUTPATIENT
Start: 2018-10-27 | End: 2018-10-31 | Stop reason: HOSPADM

## 2018-10-27 RX ORDER — FUROSEMIDE 10 MG/ML
40 INJECTION INTRAMUSCULAR; INTRAVENOUS ONCE
Status: COMPLETED | OUTPATIENT
Start: 2018-10-27 | End: 2018-10-27

## 2018-10-27 RX ORDER — ONDANSETRON 2 MG/ML
4 INJECTION INTRAMUSCULAR; INTRAVENOUS 2 TIMES DAILY PRN
Status: DISCONTINUED | OUTPATIENT
Start: 2018-10-27 | End: 2018-10-31 | Stop reason: HOSPADM

## 2018-10-27 RX ORDER — CODEINE PHOSPHATE AND GUAIFENESIN 10; 100 MG/5ML; MG/5ML
5 SOLUTION ORAL 3 TIMES DAILY PRN
Status: DISCONTINUED | OUTPATIENT
Start: 2018-10-27 | End: 2018-10-31 | Stop reason: HOSPADM

## 2018-10-27 RX ORDER — POLYETHYLENE GLYCOL 3350 17 G/17G
17 POWDER, FOR SOLUTION ORAL 2 TIMES DAILY
Status: DISCONTINUED | OUTPATIENT
Start: 2018-10-27 | End: 2018-10-31 | Stop reason: HOSPADM

## 2018-10-27 RX ORDER — TIOTROPIUM BROMIDE 18 UG/1
18 CAPSULE ORAL; RESPIRATORY (INHALATION)
Status: DISCONTINUED | OUTPATIENT
Start: 2018-10-27 | End: 2018-10-31 | Stop reason: HOSPADM

## 2018-10-27 RX ORDER — CLONAZEPAM 1 MG/1
1 TABLET ORAL NIGHTLY PRN
Status: DISCONTINUED | OUTPATIENT
Start: 2018-10-27 | End: 2018-10-27

## 2018-10-27 RX ORDER — CLONAZEPAM 1 MG/1
1 TABLET ORAL 2 TIMES DAILY PRN
Status: DISCONTINUED | OUTPATIENT
Start: 2018-10-27 | End: 2018-10-31 | Stop reason: HOSPADM

## 2018-10-27 RX ORDER — LEVOTHYROXINE SODIUM 0.05 MG/1
50 TABLET ORAL
Status: DISCONTINUED | OUTPATIENT
Start: 2018-10-28 | End: 2018-10-31 | Stop reason: HOSPADM

## 2018-10-27 RX ORDER — LORATADINE 10 MG/1
10 TABLET ORAL DAILY
Status: DISCONTINUED | OUTPATIENT
Start: 2018-10-28 | End: 2018-10-31 | Stop reason: HOSPADM

## 2018-10-27 RX ORDER — POTASSIUM CHLORIDE 1.5 G/1.58G
40 POWDER, FOR SOLUTION ORAL EVERY 4 HOURS PRN
Status: DISCONTINUED | OUTPATIENT
Start: 2018-10-27 | End: 2018-10-31 | Stop reason: HOSPADM

## 2018-10-27 RX ADMIN — SODIUM CHLORIDE, PRESERVATIVE FREE 2 ML: 5 INJECTION INTRAVENOUS at 11:54

## 2018-10-27 RX ADMIN — PANTOPRAZOLE SODIUM 40 MG: 40 TABLET, DELAYED RELEASE ORAL at 16:24

## 2018-10-27 RX ADMIN — IPRATROPIUM BROMIDE AND ALBUTEROL SULFATE 3 ML: 2.5; .5 SOLUTION RESPIRATORY (INHALATION) at 19:26

## 2018-10-27 RX ADMIN — ISOSORBIDE DINITRATE 10 MG: 10 TABLET ORAL at 15:13

## 2018-10-27 RX ADMIN — CLONAZEPAM 1 MG: 1 TABLET ORAL at 11:51

## 2018-10-27 RX ADMIN — FUROSEMIDE 40 MG: 10 INJECTION, SOLUTION INTRAVENOUS at 11:51

## 2018-10-27 RX ADMIN — SODIUM CHLORIDE, PRESERVATIVE FREE 2 ML: 5 INJECTION INTRAVENOUS at 20:13

## 2018-10-27 RX ADMIN — GABAPENTIN 300 MG: 300 CAPSULE ORAL at 20:10

## 2018-10-27 RX ADMIN — METHYLPREDNISOLONE SODIUM SUCCINATE 125 MG: 125 INJECTION, POWDER, FOR SOLUTION INTRAMUSCULAR; INTRAVENOUS at 07:48

## 2018-10-27 RX ADMIN — ACETAMINOPHEN AND CODEINE PHOSPHATE 1 TABLET: 300; 30 TABLET ORAL at 20:06

## 2018-10-27 RX ADMIN — SODIUM CHLORIDE 1000 ML: 9 INJECTION, SOLUTION INTRAVENOUS at 07:48

## 2018-10-27 RX ADMIN — ALBUTEROL SULFATE 12.5 MG: 2.5 SOLUTION RESPIRATORY (INHALATION) at 07:36

## 2018-10-27 RX ADMIN — ACETAMINOPHEN 650 MG: 325 TABLET ORAL at 11:51

## 2018-10-27 RX ADMIN — METOPROLOL TARTRATE 25 MG: 25 TABLET, FILM COATED ORAL at 20:10

## 2018-10-27 RX ADMIN — METHYLPREDNISOLONE SODIUM SUCCINATE 60 MG: 125 INJECTION, POWDER, FOR SOLUTION INTRAMUSCULAR; INTRAVENOUS at 18:25

## 2018-10-27 RX ADMIN — ENOXAPARIN SODIUM 40 MG: 40 INJECTION SUBCUTANEOUS at 11:53

## 2018-10-27 RX ADMIN — CLONAZEPAM 1 MG: 1 TABLET ORAL at 20:10

## 2018-10-27 RX ADMIN — BUSPIRONE HYDROCHLORIDE 30 MG: 15 TABLET ORAL at 20:10

## 2018-10-27 RX ADMIN — FLUTICASONE PROPIONATE AND SALMETEROL 1 PUFF: 50; 500 POWDER RESPIRATORY (INHALATION) at 19:26

## 2018-10-27 RX ADMIN — IPRATROPIUM BROMIDE AND ALBUTEROL SULFATE 3 ML: .5; 3 SOLUTION RESPIRATORY (INHALATION) at 07:24

## 2018-10-27 RX ADMIN — MAGNESIUM SULFATE HEPTAHYDRATE 1 G: 1 INJECTION, SOLUTION INTRAVENOUS at 08:40

## 2018-10-27 RX ADMIN — POLYETHYLENE GLYCOL (3350) 17 G: 17 POWDER, FOR SOLUTION ORAL at 16:52

## 2018-10-27 RX ADMIN — TRAMADOL HYDROCHLORIDE 50 MG: 50 TABLET, COATED ORAL at 22:25

## 2018-10-27 RX ADMIN — MAGNESIUM SULFATE HEPTAHYDRATE 1 G: 1 INJECTION, SOLUTION INTRAVENOUS at 10:04

## 2018-10-27 RX ADMIN — GABAPENTIN 200 MG: 100 CAPSULE ORAL at 13:40

## 2018-10-27 RX ADMIN — LINAGLIPTIN 5 MG: 5 TABLET, FILM COATED ORAL at 16:52

## 2018-10-27 RX ADMIN — INSULIN LISPRO 10 UNITS: 100 INJECTION, SOLUTION INTRAVENOUS; SUBCUTANEOUS at 16:53

## 2018-10-27 RX ADMIN — DULOXETINE 60 MG: 60 CAPSULE, DELAYED RELEASE ORAL at 16:52

## 2018-10-27 RX ADMIN — IPRATROPIUM BROMIDE AND ALBUTEROL SULFATE 3 ML: 2.5; .5 SOLUTION RESPIRATORY (INHALATION) at 13:30

## 2018-10-27 RX ADMIN — ISOSORBIDE DINITRATE 10 MG: 10 TABLET ORAL at 20:11

## 2018-10-27 RX ADMIN — GUAIFENESIN AND CODEINE PHOSPHATE 5 ML: 10; 100 LIQUID ORAL at 20:06

## 2018-10-27 RX ADMIN — AZITHROMYCIN MONOHYDRATE 500 MG: 500 INJECTION, POWDER, LYOPHILIZED, FOR SOLUTION INTRAVENOUS at 11:51

## 2018-10-27 RX ADMIN — IPRATROPIUM BROMIDE AND ALBUTEROL SULFATE 3 ML: .5; 3 SOLUTION RESPIRATORY (INHALATION) at 07:38

## 2018-10-27 ASSESSMENT — PAIN SCALES - GENERAL
PAIN_LEVEL_AT_REST: 2
PAINLEVEL_OUTOF10: 4
PAIN_LEVEL_AT_REST: 6
PAIN_LEVEL_AT_REST: 6
PAINLEVEL_OUTOF10: 4

## 2018-10-27 ASSESSMENT — ENCOUNTER SYMPTOMS
ABDOMINAL PAIN: 0
FEVER: 0
DIARRHEA: 0
WOUND: 0
APPETITE CHANGE: 0
NERVOUS/ANXIOUS: 0
ACTIVITY CHANGE: 0
NAUSEA: 0
CHEST TIGHTNESS: 0
SORE THROAT: 0
WEAKNESS: 1
SINUS PRESSURE: 0
BRUISES/BLEEDS EASILY: 0
COLOR CHANGE: 0
NUMBNESS: 0
SHORTNESS OF BREATH: 1
COUGH: 1
FATIGUE: 0
BACK PAIN: 0
LIGHT-HEADEDNESS: 0
DIZZINESS: 0
CHILLS: 0
CONFUSION: 0
ADENOPATHY: 0
RHINORRHEA: 0
HEADACHES: 0
PHOTOPHOBIA: 0
VOMITING: 0

## 2018-10-27 ASSESSMENT — LIFESTYLE VARIABLES
AUDIT-C TOTAL SCORE: 1
HOW OFTEN DO YOU HAVE A DRINK CONTAINING ALCOHOL: MONTHLY OR LESS
ALCOHOL_USE_STATUS: NO OR LOW RISK WITH VALIDATED TOOL
HOW OFTEN DO YOU HAVE 6 OR MORE DRINKS ON ONE OCCASION: NEVER
HOW MANY STANDARD DRINKS CONTAINING ALCOHOL DO YOU HAVE ON A TYPICAL DAY: 0,1 OR 2

## 2018-10-27 ASSESSMENT — ACTIVITIES OF DAILY LIVING (ADL)
ADL_BEFORE_ADMISSION: INDEPENDENT
RECENT_DECLINE_ADL: YES, DECLINE IN BATHING/DRESSING/FEEDING, COLLABORATE WITH PROVIDER (T);YES, DECLINE IN AMBULATION/TRANSFERRING, COLLABORATE WITH PROVIDER (T)
ADL_SCORE: 12
DESCRIBE HOW PAIN IMPACTS YOUR LIFE: MOOD;ENERGY LEVEL
ADL_SHORT_OF_BREATH: YES
CHRONIC_PAIN_PRESENT: YES, CHRONIC

## 2018-10-28 ENCOUNTER — APPOINTMENT (OUTPATIENT)
Dept: CARDIOLOGY | Age: 63
DRG: 202 | End: 2018-10-28
Attending: INTERNAL MEDICINE

## 2018-10-28 LAB
ALBUMIN SERPL-MCNC: 3.3 G/DL (ref 3.6–5.1)
ALBUMIN/GLOB SERPL: 0.8 {RATIO} (ref 1–2.4)
ALP SERPL-CCNC: 62 UNITS/L (ref 45–117)
ALT SERPL-CCNC: 38 UNITS/L
ANION GAP SERPL CALC-SCNC: 11 MMOL/L (ref 10–20)
AST SERPL-CCNC: 29 UNITS/L
AV MEAN GRADIENT (AVMG): 14.1 MMHG
AV PEAK GRADIENT (AVPG): 26.5 MMHG
AV PEAK VELOCITY (AVPV): 2.6 M/S
BASOPHILS # BLD AUTO: 0 K/MCL (ref 0–0.3)
BASOPHILS NFR BLD AUTO: 0 %
BILIRUB SERPL-MCNC: 0.6 MG/DL (ref 0.2–1)
BUN SERPL-MCNC: 19 MG/DL (ref 6–20)
BUN/CREAT SERPL: 19 (ref 7–25)
C PNEUM DNA SPEC QL NAA+PROBE: NOT DETECTED
CALCIUM SERPL-MCNC: 8.3 MG/DL (ref 8.4–10.2)
CHLORIDE SERPL-SCNC: 92 MMOL/L (ref 98–107)
CO2 SERPL-SCNC: 29 MMOL/L (ref 21–32)
CREAT SERPL-MCNC: 1.02 MG/DL (ref 0.51–0.95)
DIFFERENTIAL METHOD BLD: ABNORMAL
DOP CALC LVOT PEAK VEL (LVOTPV): 1.1 M/S
E WAVE DECELARATION TIME (MDT): 208.3 MS
EOSINOPHIL # BLD AUTO: 0 K/MCL (ref 0.1–0.5)
EOSINOPHIL NFR SPEC: 0 %
ERYTHROCYTE [DISTWIDTH] IN BLOOD: 13.3 % (ref 11–15)
EST RIGHT VENT SYSTOLIC PRESSURE BY TRICUSPID REGURGITATION JET (RVSP): 43 MMHG
FLUAV H1 2009 PAND RNA NPH QL NAA+PROBE: NOT DETECTED
FLUAV H1 RNA NPH QL NAA+PROBE: NOT DETECTED
FLUAV H3 RNA NPH QL NAA+PROBE: NOT DETECTED
FLUAV RNA NPH QL NAA+PROBE: NORMAL
FLUBV RNA NPH QL NAA+PROBE: NOT DETECTED
GLOBULIN SER-MCNC: 4.3 G/DL (ref 2–4)
GLUCOSE BLDC GLUCOMTR-MCNC: 137 MG/DL (ref 65–99)
GLUCOSE BLDC GLUCOMTR-MCNC: 180 MG/DL (ref 65–99)
GLUCOSE BLDC GLUCOMTR-MCNC: 195 MG/DL (ref 65–99)
GLUCOSE BLDC GLUCOMTR-MCNC: 214 MG/DL (ref 65–99)
GLUCOSE SERPL-MCNC: 197 MG/DL (ref 65–99)
HADV DNA NPH QL NAA+PROBE: NOT DETECTED
HBA1C MFR BLD: 7 % (ref 4.5–5.6)
HBOV DNA SPEC QL NAA+PROBE: NOT DETECTED
HCOV 229E RNA SPEC QL NAA+PROBE: NOT DETECTED
HCOV HKU1 RNA SPEC QL NAA+PROBE: NOT DETECTED
HCOV NL63 RNA SPEC QL NAA+PROBE: NOT DETECTED
HCOV OC43 RNA SPEC QL NAA+PROBE: NOT DETECTED
HCT VFR BLD CALC: 33.3 % (ref 36–46.5)
HGB BLD-MCNC: 11.1 G/DL (ref 12–15.5)
HMPV RNA NPH QL NAA+PROBE: NOT DETECTED
HPIV1 RNA NPH QL NAA+PROBE: NOT DETECTED
HPIV2 RNA NPH QL NAA+PROBE: NOT DETECTED
HPIV3 RNA NPH QL NAA+PROBE: NOT DETECTED
HPIV4 RNA NPH QL NAA+PROBE: NOT DETECTED
INTERVENTRICULAR SEPTUM IN END DIASTOLE (IVSD): 1.2 CM
LEFT INTERNAL DIMENSION IN SYSTOLE (LVSD): 3.5 CM
LEFT VENTRICULAR INTERNAL DIMENSION IN DIASTOLE (LVDD): 5.3 CM
LEFT VENTRICULAR POSTERIOR WALL IN END DIASTOLE (LVPW): 1.2 CM
LV EF: 66 %
LV END SYSTOLIC LONGITUDINAL STRAIN GLOBAL (LVGS): -22.4 %
LVOT 2D (LVOTD): 2.4 CM
LYMPHOCYTES # BLD MANUAL: 0.4 K/MCL (ref 1–4)
LYMPHOCYTES NFR BLD MANUAL: 3 %
M PNEUMO DNA SPEC QL NAA+PROBE: NOT DETECTED
MAGNESIUM SERPL-MCNC: 2.2 MG/DL (ref 1.7–2.4)
MCH RBC QN AUTO: 29.7 PG (ref 26–34)
MCHC RBC AUTO-ENTMCNC: 33.3 G/DL (ref 32–36.5)
MCV RBC AUTO: 89 FL (ref 78–100)
MONOCYTES # BLD MANUAL: 0.2 K/MCL (ref 0.3–0.9)
MONOCYTES NFR BLD MANUAL: 2 %
MV E TISSUE VEL MED (MESV): 7 CM/S
MV E WAVE VEL/E TISSUE VEL MED(MSR): 16.4
MV PEAK A VELOCITY (MVPAV): 1.2 M/S
MV PEAK E VELOCITY (MVPEV): 1.2 M/S
NEUTROPHILS # BLD: 13 K/MCL (ref 1.8–7.7)
NEUTROPHILS NFR BLD AUTO: 95 %
PLATELET # BLD: 195 K/MCL (ref 140–450)
POTASSIUM SERPL-SCNC: 3.9 MMOL/L (ref 3.4–5.1)
PROT SERPL-MCNC: 7.6 G/DL (ref 6.4–8.2)
RBC # BLD: 3.74 MIL/MCL (ref 4–5.2)
RIGHT VENTRICULAR AREA CHANGE (APICAL 4-CHAMBER VIEW) (RVFAC): 29.6 %
RSV A RNA NPH QL NAA+PROBE: NOT DETECTED
RSV B RNA NPH QL NAA+PROBE: NOT DETECTED
RV+EV RNA SPEC QL NAA+PROBE: NOT DETECTED
SODIUM SERPL-SCNC: 128 MMOL/L (ref 135–145)
SPECIMEN SOURCE: NORMAL
TRICUSPID VALVE PEAK REGURGITATION VELOCITY (TRPV): 2.6 M/S
TV ESTIMATED RIGHT ARTERIAL PRESSURE (RAP): 15 MMHG
WBC # BLD: 13.7 K/MCL (ref 4.2–11)

## 2018-10-28 PROCEDURE — 99233 SBSQ HOSP IP/OBS HIGH 50: CPT | Performed by: INTERNAL MEDICINE

## 2018-10-28 PROCEDURE — 80053 COMPREHEN METABOLIC PANEL: CPT

## 2018-10-28 PROCEDURE — 94668 MNPJ CHEST WALL SBSQ: CPT

## 2018-10-28 PROCEDURE — 0399T MYOCARDIAL STRAIN IMAGING QUANTITATIVE ASSESSMENT OF MYOCARDIAL MECH: CPT | Performed by: INTERNAL MEDICINE

## 2018-10-28 PROCEDURE — 76376 3D RENDER W/INTRP POSTPROCES: CPT | Performed by: INTERNAL MEDICINE

## 2018-10-28 PROCEDURE — 94667 MNPJ CHEST WALL 1ST: CPT

## 2018-10-28 PROCEDURE — 10004651 HB RX, NO CHARGE ITEM: Performed by: INTERNAL MEDICINE

## 2018-10-28 PROCEDURE — 90674 CCIIV4 VAC NO PRSV 0.5 ML IM: CPT | Performed by: INTERNAL MEDICINE

## 2018-10-28 PROCEDURE — 85025 COMPLETE CBC W/AUTO DIFF WBC: CPT

## 2018-10-28 PROCEDURE — 93306 TTE W/DOPPLER COMPLETE: CPT

## 2018-10-28 PROCEDURE — 83735 ASSAY OF MAGNESIUM: CPT

## 2018-10-28 PROCEDURE — 10002807 HB RX 258: Performed by: INTERNAL MEDICINE

## 2018-10-28 PROCEDURE — 82962 GLUCOSE BLOOD TEST: CPT

## 2018-10-28 PROCEDURE — 94640 AIRWAY INHALATION TREATMENT: CPT

## 2018-10-28 PROCEDURE — 36415 COLL VENOUS BLD VENIPUNCTURE: CPT

## 2018-10-28 PROCEDURE — 93306 TTE W/DOPPLER COMPLETE: CPT | Performed by: INTERNAL MEDICINE

## 2018-10-28 PROCEDURE — 10002800 HB RX 250 W HCPCS: Performed by: INTERNAL MEDICINE

## 2018-10-28 PROCEDURE — 10002803 HB RX 637: Performed by: INTERNAL MEDICINE

## 2018-10-28 PROCEDURE — 10003445 HB TELEMETRY PER DAY

## 2018-10-28 PROCEDURE — 10000002 HB ROOM CHARGE MED SURG

## 2018-10-28 PROCEDURE — G0378 HOSPITAL OBSERVATION PER HR: HCPCS

## 2018-10-28 PROCEDURE — 10002803 HB RX 637: Performed by: HOSPITALIST

## 2018-10-28 PROCEDURE — 10002801 HB RX 250 W/O HCPCS: Performed by: INTERNAL MEDICINE

## 2018-10-28 RX ORDER — FUROSEMIDE 10 MG/ML
40 INJECTION INTRAMUSCULAR; INTRAVENOUS ONCE
Status: COMPLETED | OUTPATIENT
Start: 2018-10-28 | End: 2018-10-28

## 2018-10-28 RX ADMIN — GABAPENTIN 200 MG: 100 CAPSULE ORAL at 05:20

## 2018-10-28 RX ADMIN — ISOSORBIDE DINITRATE 10 MG: 10 TABLET ORAL at 13:20

## 2018-10-28 RX ADMIN — IPRATROPIUM BROMIDE AND ALBUTEROL SULFATE 3 ML: 2.5; .5 SOLUTION RESPIRATORY (INHALATION) at 12:31

## 2018-10-28 RX ADMIN — POLYETHYLENE GLYCOL (3350) 17 G: 17 POWDER, FOR SOLUTION ORAL at 08:32

## 2018-10-28 RX ADMIN — IPRATROPIUM BROMIDE AND ALBUTEROL SULFATE 3 ML: 2.5; .5 SOLUTION RESPIRATORY (INHALATION) at 19:01

## 2018-10-28 RX ADMIN — IPRATROPIUM BROMIDE AND ALBUTEROL SULFATE 3 ML: 2.5; .5 SOLUTION RESPIRATORY (INHALATION) at 01:37

## 2018-10-28 RX ADMIN — GUAIFENESIN AND CODEINE PHOSPHATE 5 ML: 10; 100 LIQUID ORAL at 05:20

## 2018-10-28 RX ADMIN — INSULIN LISPRO 4 UNITS: 100 INJECTION, SOLUTION INTRAVENOUS; SUBCUTANEOUS at 17:04

## 2018-10-28 RX ADMIN — ISOSORBIDE DINITRATE 10 MG: 10 TABLET ORAL at 21:15

## 2018-10-28 RX ADMIN — BUPROPION HYDROCHLORIDE 300 MG: 150 TABLET, EXTENDED RELEASE ORAL at 08:34

## 2018-10-28 RX ADMIN — BACLOFEN 10 MG: 10 TABLET ORAL at 21:15

## 2018-10-28 RX ADMIN — BENZONATATE 200 MG: 100 CAPSULE ORAL at 05:20

## 2018-10-28 RX ADMIN — SODIUM CHLORIDE, PRESERVATIVE FREE 2 ML: 5 INJECTION INTRAVENOUS at 08:30

## 2018-10-28 RX ADMIN — SODIUM CHLORIDE, PRESERVATIVE FREE 2 ML: 5 INJECTION INTRAVENOUS at 21:17

## 2018-10-28 RX ADMIN — PANTOPRAZOLE SODIUM 40 MG: 40 TABLET, DELAYED RELEASE ORAL at 16:04

## 2018-10-28 RX ADMIN — ACETAMINOPHEN AND CODEINE PHOSPHATE 1 TABLET: 300; 30 TABLET ORAL at 05:44

## 2018-10-28 RX ADMIN — BACLOFEN 10 MG: 10 TABLET ORAL at 08:37

## 2018-10-28 RX ADMIN — METOPROLOL TARTRATE 25 MG: 25 TABLET, FILM COATED ORAL at 21:15

## 2018-10-28 RX ADMIN — GABAPENTIN 300 MG: 300 CAPSULE ORAL at 21:14

## 2018-10-28 RX ADMIN — CLONAZEPAM 1 MG: 1 TABLET ORAL at 21:15

## 2018-10-28 RX ADMIN — LEVOTHYROXINE SODIUM 50 MCG: 50 TABLET ORAL at 08:34

## 2018-10-28 RX ADMIN — ISOSORBIDE DINITRATE 10 MG: 10 TABLET ORAL at 08:34

## 2018-10-28 RX ADMIN — BUSPIRONE HYDROCHLORIDE 30 MG: 15 TABLET ORAL at 08:34

## 2018-10-28 RX ADMIN — IPRATROPIUM BROMIDE AND ALBUTEROL SULFATE 3 ML: 2.5; .5 SOLUTION RESPIRATORY (INHALATION) at 07:19

## 2018-10-28 RX ADMIN — DULOXETINE 60 MG: 60 CAPSULE, DELAYED RELEASE ORAL at 18:17

## 2018-10-28 RX ADMIN — SODIUM CHLORIDE 250 ML: 9 INJECTION, SOLUTION INTRAVENOUS at 08:26

## 2018-10-28 RX ADMIN — FUROSEMIDE 40 MG: 10 INJECTION, SOLUTION INTRAVENOUS at 11:38

## 2018-10-28 RX ADMIN — METHYLPREDNISOLONE SODIUM SUCCINATE 60 MG: 125 INJECTION, POWDER, FOR SOLUTION INTRAMUSCULAR; INTRAVENOUS at 08:32

## 2018-10-28 RX ADMIN — FLUTICASONE PROPIONATE 2 SPRAY: 50 SPRAY, METERED NASAL at 08:30

## 2018-10-28 RX ADMIN — DULOXETINE 60 MG: 60 CAPSULE, DELAYED RELEASE ORAL at 08:34

## 2018-10-28 RX ADMIN — FLUTICASONE PROPIONATE AND SALMETEROL 1 PUFF: 50; 500 POWDER RESPIRATORY (INHALATION) at 19:01

## 2018-10-28 RX ADMIN — GABAPENTIN 200 MG: 100 CAPSULE ORAL at 13:20

## 2018-10-28 RX ADMIN — ENOXAPARIN SODIUM 40 MG: 40 INJECTION SUBCUTANEOUS at 08:31

## 2018-10-28 RX ADMIN — PANTOPRAZOLE SODIUM 40 MG: 40 TABLET, DELAYED RELEASE ORAL at 05:22

## 2018-10-28 RX ADMIN — CLONAZEPAM 1 MG: 1 TABLET ORAL at 14:07

## 2018-10-28 RX ADMIN — BUSPIRONE HYDROCHLORIDE 30 MG: 15 TABLET ORAL at 18:17

## 2018-10-28 RX ADMIN — HYDROXYZINE HYDROCHLORIDE 75 MG: 25 TABLET, FILM COATED ORAL at 21:14

## 2018-10-28 RX ADMIN — METHYLPREDNISOLONE SODIUM SUCCINATE 60 MG: 125 INJECTION, POWDER, FOR SOLUTION INTRAMUSCULAR; INTRAVENOUS at 18:17

## 2018-10-28 RX ADMIN — FLUTICASONE PROPIONATE AND SALMETEROL 1 PUFF: 50; 500 POWDER RESPIRATORY (INHALATION) at 07:19

## 2018-10-28 RX ADMIN — AZITHROMYCIN MONOHYDRATE 500 MG: 500 INJECTION, POWDER, LYOPHILIZED, FOR SOLUTION INTRAVENOUS at 08:26

## 2018-10-28 RX ADMIN — METOPROLOL TARTRATE 25 MG: 25 TABLET, FILM COATED ORAL at 08:34

## 2018-10-28 RX ADMIN — INSULIN LISPRO 2 UNITS: 100 INJECTION, SOLUTION INTRAVENOUS; SUBCUTANEOUS at 09:37

## 2018-10-28 RX ADMIN — LORATADINE 10 MG: 10 TABLET ORAL at 08:34

## 2018-10-28 RX ADMIN — LINAGLIPTIN 5 MG: 5 TABLET, FILM COATED ORAL at 08:34

## 2018-10-28 RX ADMIN — INFLUENZA A VIRUS A/SINGAPORE/GP1908/2015 IVR-180 (H1N1) ANTIGEN (MDCK CELL DERIVED, PROPIOLACTONE INACTIVATED), INFLUENZA A VIRUS A/NORTH CAROLINA/04/2016 (H3N2) HEMAGGLUTININ ANTIGEN (MDCK CELL DERIVED, PROPIOLACTONE INACTIVATED), INFLUENZA B VIRUS B/IOWA/06/2017 HEMAGGLUTININ ANTIGEN (MDCK CELL DERIVED, PROPIOLACTONE INACTIVATED), INFLUENZA B VIRUS B/SINGAPORE/INFTT-16-0610/2016 HEMAGGLUTININ ANTIGEN (MDCK CELL DERIVED, PROPIOLACTONE INACTIVATED) 0.5 ML: 15; 15; 15; 15 INJECTION, SUSPENSION INTRAMUSCULAR at 08:38

## 2018-10-28 RX ADMIN — NYSTATIN 500000 UNITS: 500000 SUSPENSION ORAL at 22:04

## 2018-10-28 ASSESSMENT — PAIN SCALES - GENERAL
PAIN_LEVEL_AT_REST: 6
PAIN_LEVEL_AT_REST: 0
PAIN_LEVEL_AT_REST: 5
PAIN_LEVEL_AT_REST: 5
PAIN_LEVEL_WITH_ACTIVITY: 0
PAIN_LEVEL_AT_REST: 3
PAIN_LEVEL_AT_REST: 6
PAIN_LEVEL_AT_REST: 0
PAIN_LEVEL_WITH_ACTIVITY: 0

## 2018-10-29 ENCOUNTER — APPOINTMENT (OUTPATIENT)
Dept: REHABILITATION | Age: 63
End: 2018-10-29
Attending: ORTHOPAEDIC SURGERY

## 2018-10-29 LAB
ALBUMIN SERPL-MCNC: 3.3 G/DL (ref 3.6–5.1)
ALBUMIN/GLOB SERPL: 0.8 {RATIO} (ref 1–2.4)
ALP SERPL-CCNC: 61 UNITS/L (ref 45–117)
ALT SERPL-CCNC: 38 UNITS/L
ANION GAP SERPL CALC-SCNC: 11 MMOL/L (ref 10–20)
AST SERPL-CCNC: 29 UNITS/L
BASOPHILS # BLD AUTO: 0 K/MCL (ref 0–0.3)
BASOPHILS NFR BLD AUTO: 0 %
BILIRUB SERPL-MCNC: 0.5 MG/DL (ref 0.2–1)
BUN SERPL-MCNC: 21 MG/DL (ref 6–20)
BUN/CREAT SERPL: 20 (ref 7–25)
CALCIUM SERPL-MCNC: 8.3 MG/DL (ref 8.4–10.2)
CHLORIDE SERPL-SCNC: 93 MMOL/L (ref 98–107)
CO2 SERPL-SCNC: 30 MMOL/L (ref 21–32)
CREAT SERPL-MCNC: 1.03 MG/DL (ref 0.51–0.95)
DIFFERENTIAL METHOD BLD: ABNORMAL
EOSINOPHIL # BLD AUTO: 0 K/MCL (ref 0.1–0.5)
EOSINOPHIL NFR SPEC: 0 %
ERYTHROCYTE [DISTWIDTH] IN BLOOD: 13.7 % (ref 11–15)
GLOBULIN SER-MCNC: 4.2 G/DL (ref 2–4)
GLUCOSE BLDC GLUCOMTR-MCNC: 164 MG/DL (ref 65–99)
GLUCOSE BLDC GLUCOMTR-MCNC: 195 MG/DL (ref 65–99)
GLUCOSE BLDC GLUCOMTR-MCNC: 221 MG/DL (ref 65–99)
GLUCOSE SERPL-MCNC: 199 MG/DL (ref 65–99)
HCT VFR BLD CALC: 33.1 % (ref 36–46.5)
HGB BLD-MCNC: 11.3 G/DL (ref 12–15.5)
LYMPHOCYTES # BLD MANUAL: 0.6 K/MCL (ref 1–4)
LYMPHOCYTES NFR BLD MANUAL: 6 %
MAGNESIUM SERPL-MCNC: 2.2 MG/DL (ref 1.7–2.4)
MCH RBC QN AUTO: 30.3 PG (ref 26–34)
MCHC RBC AUTO-ENTMCNC: 34.1 G/DL (ref 32–36.5)
MCV RBC AUTO: 88.7 FL (ref 78–100)
MONOCYTES # BLD MANUAL: 0.6 K/MCL (ref 0.3–0.9)
MONOCYTES NFR BLD MANUAL: 6 %
NEUTROPHILS # BLD: 8.2 K/MCL (ref 1.8–7.7)
NEUTROPHILS NFR BLD AUTO: 88 %
NT-PROBNP SERPL-MCNC: 8232 PG/ML
PHOSPHATE SERPL-MCNC: 2.9 MG/DL (ref 2.4–4.7)
PLATELET # BLD: 204 K/MCL (ref 140–450)
POTASSIUM SERPL-SCNC: 3.8 MMOL/L (ref 3.4–5.1)
PROT SERPL-MCNC: 7.5 G/DL (ref 6.4–8.2)
RBC # BLD: 3.73 MIL/MCL (ref 4–5.2)
SODIUM SERPL-SCNC: 130 MMOL/L (ref 135–145)
WBC # BLD: 9.3 K/MCL (ref 4.2–11)

## 2018-10-29 PROCEDURE — 10000002 HB ROOM CHARGE MED SURG

## 2018-10-29 PROCEDURE — 10002801 HB RX 250 W/O HCPCS: Performed by: INTERNAL MEDICINE

## 2018-10-29 PROCEDURE — 10002807 HB RX 258: Performed by: INTERNAL MEDICINE

## 2018-10-29 PROCEDURE — 10003445 HB TELEMETRY PER DAY

## 2018-10-29 PROCEDURE — 10002800 HB RX 250 W HCPCS: Performed by: INTERNAL MEDICINE

## 2018-10-29 PROCEDURE — 83735 ASSAY OF MAGNESIUM: CPT

## 2018-10-29 PROCEDURE — G0378 HOSPITAL OBSERVATION PER HR: HCPCS

## 2018-10-29 PROCEDURE — 85025 COMPLETE CBC W/AUTO DIFF WBC: CPT

## 2018-10-29 PROCEDURE — 83880 ASSAY OF NATRIURETIC PEPTIDE: CPT

## 2018-10-29 PROCEDURE — 10002803 HB RX 637: Performed by: INTERNAL MEDICINE

## 2018-10-29 PROCEDURE — 10004325 HB COUNTER ASSESSMENT EA 15 MIN

## 2018-10-29 PROCEDURE — 94640 AIRWAY INHALATION TREATMENT: CPT

## 2018-10-29 PROCEDURE — 82962 GLUCOSE BLOOD TEST: CPT

## 2018-10-29 PROCEDURE — 10002803 HB RX 637: Performed by: HOSPITALIST

## 2018-10-29 PROCEDURE — 80053 COMPREHEN METABOLIC PANEL: CPT

## 2018-10-29 PROCEDURE — 84100 ASSAY OF PHOSPHORUS: CPT

## 2018-10-29 PROCEDURE — 10004651 HB RX, NO CHARGE ITEM: Performed by: INTERNAL MEDICINE

## 2018-10-29 PROCEDURE — 99233 SBSQ HOSP IP/OBS HIGH 50: CPT | Performed by: INTERNAL MEDICINE

## 2018-10-29 RX ORDER — METOPROLOL TARTRATE 50 MG/1
50 TABLET, FILM COATED ORAL EVERY 12 HOURS SCHEDULED
Status: DISCONTINUED | OUTPATIENT
Start: 2018-10-29 | End: 2018-10-30

## 2018-10-29 RX ORDER — FUROSEMIDE 10 MG/ML
40 INJECTION INTRAMUSCULAR; INTRAVENOUS DAILY
Status: DISCONTINUED | OUTPATIENT
Start: 2018-10-29 | End: 2018-10-30

## 2018-10-29 RX ORDER — FUROSEMIDE 10 MG/ML
40 INJECTION INTRAMUSCULAR; INTRAVENOUS ONCE
Status: COMPLETED | OUTPATIENT
Start: 2018-10-29 | End: 2018-10-29

## 2018-10-29 RX ORDER — AZITHROMYCIN 250 MG/1
500 TABLET, FILM COATED ORAL DAILY
Status: DISCONTINUED | OUTPATIENT
Start: 2018-10-30 | End: 2018-10-31 | Stop reason: HOSPADM

## 2018-10-29 RX ADMIN — BUSPIRONE HYDROCHLORIDE 30 MG: 15 TABLET ORAL at 17:09

## 2018-10-29 RX ADMIN — LORATADINE 10 MG: 10 TABLET ORAL at 09:05

## 2018-10-29 RX ADMIN — FUROSEMIDE 40 MG: 10 INJECTION, SOLUTION INTRAVENOUS at 09:05

## 2018-10-29 RX ADMIN — FLUTICASONE PROPIONATE AND SALMETEROL 1 PUFF: 50; 500 POWDER RESPIRATORY (INHALATION) at 07:30

## 2018-10-29 RX ADMIN — IPRATROPIUM BROMIDE AND ALBUTEROL SULFATE 3 ML: 2.5; .5 SOLUTION RESPIRATORY (INHALATION) at 00:58

## 2018-10-29 RX ADMIN — ISOSORBIDE DINITRATE 10 MG: 10 TABLET ORAL at 20:16

## 2018-10-29 RX ADMIN — BACLOFEN 10 MG: 10 TABLET ORAL at 12:41

## 2018-10-29 RX ADMIN — IPRATROPIUM BROMIDE AND ALBUTEROL SULFATE 3 ML: 2.5; .5 SOLUTION RESPIRATORY (INHALATION) at 13:28

## 2018-10-29 RX ADMIN — CLONAZEPAM 1 MG: 1 TABLET ORAL at 23:31

## 2018-10-29 RX ADMIN — NYSTATIN 500000 UNITS: 500000 SUSPENSION ORAL at 09:26

## 2018-10-29 RX ADMIN — POLYETHYLENE GLYCOL (3350) 17 G: 17 POWDER, FOR SOLUTION ORAL at 09:07

## 2018-10-29 RX ADMIN — BUSPIRONE HYDROCHLORIDE 30 MG: 15 TABLET ORAL at 09:20

## 2018-10-29 RX ADMIN — GUAIFENESIN AND CODEINE PHOSPHATE 5 ML: 10; 100 LIQUID ORAL at 21:39

## 2018-10-29 RX ADMIN — NYSTATIN 500000 UNITS: 500000 SUSPENSION ORAL at 20:15

## 2018-10-29 RX ADMIN — IPRATROPIUM BROMIDE AND ALBUTEROL SULFATE 3 ML: 2.5; .5 SOLUTION RESPIRATORY (INHALATION) at 07:30

## 2018-10-29 RX ADMIN — ISOSORBIDE DINITRATE 10 MG: 10 TABLET ORAL at 13:14

## 2018-10-29 RX ADMIN — METHYLPREDNISOLONE SODIUM SUCCINATE 60 MG: 125 INJECTION, POWDER, FOR SOLUTION INTRAMUSCULAR; INTRAVENOUS at 17:09

## 2018-10-29 RX ADMIN — BUPROPION HYDROCHLORIDE 300 MG: 150 TABLET, EXTENDED RELEASE ORAL at 09:05

## 2018-10-29 RX ADMIN — INSULIN LISPRO 4 UNITS: 100 INJECTION, SOLUTION INTRAVENOUS; SUBCUTANEOUS at 16:55

## 2018-10-29 RX ADMIN — HYDROXYZINE HYDROCHLORIDE 75 MG: 25 TABLET, FILM COATED ORAL at 23:32

## 2018-10-29 RX ADMIN — LINAGLIPTIN 5 MG: 5 TABLET, FILM COATED ORAL at 09:05

## 2018-10-29 RX ADMIN — LEVOTHYROXINE SODIUM 50 MCG: 50 TABLET ORAL at 06:16

## 2018-10-29 RX ADMIN — FLUTICASONE PROPIONATE 2 SPRAY: 50 SPRAY, METERED NASAL at 09:21

## 2018-10-29 RX ADMIN — TRAMADOL HYDROCHLORIDE 50 MG: 50 TABLET, COATED ORAL at 23:31

## 2018-10-29 RX ADMIN — INSULIN LISPRO 2 UNITS: 100 INJECTION, SOLUTION INTRAVENOUS; SUBCUTANEOUS at 11:33

## 2018-10-29 RX ADMIN — FUROSEMIDE 40 MG: 10 INJECTION, SOLUTION INTRAVENOUS at 20:11

## 2018-10-29 RX ADMIN — GABAPENTIN 200 MG: 100 CAPSULE ORAL at 13:14

## 2018-10-29 RX ADMIN — BENZONATATE 200 MG: 100 CAPSULE ORAL at 16:31

## 2018-10-29 RX ADMIN — INSULIN LISPRO 2 UNITS: 100 INJECTION, SOLUTION INTRAVENOUS; SUBCUTANEOUS at 07:06

## 2018-10-29 RX ADMIN — PANTOPRAZOLE SODIUM 40 MG: 40 TABLET, DELAYED RELEASE ORAL at 15:09

## 2018-10-29 RX ADMIN — METHYLPREDNISOLONE SODIUM SUCCINATE 60 MG: 125 INJECTION, POWDER, FOR SOLUTION INTRAMUSCULAR; INTRAVENOUS at 09:06

## 2018-10-29 RX ADMIN — SODIUM CHLORIDE, PRESERVATIVE FREE 2 ML: 5 INJECTION INTRAVENOUS at 20:13

## 2018-10-29 RX ADMIN — AZITHROMYCIN MONOHYDRATE 500 MG: 500 INJECTION, POWDER, LYOPHILIZED, FOR SOLUTION INTRAVENOUS at 09:13

## 2018-10-29 RX ADMIN — ISOSORBIDE DINITRATE 10 MG: 10 TABLET ORAL at 09:20

## 2018-10-29 RX ADMIN — DULOXETINE 60 MG: 60 CAPSULE, DELAYED RELEASE ORAL at 09:05

## 2018-10-29 RX ADMIN — NYSTATIN 500000 UNITS: 500000 SUSPENSION ORAL at 12:39

## 2018-10-29 RX ADMIN — METOPROLOL TARTRATE 50 MG: 50 TABLET, FILM COATED ORAL at 20:16

## 2018-10-29 RX ADMIN — SODIUM CHLORIDE, PRESERVATIVE FREE 2 ML: 5 INJECTION INTRAVENOUS at 09:22

## 2018-10-29 RX ADMIN — IPRATROPIUM BROMIDE AND ALBUTEROL SULFATE 3 ML: 2.5; .5 SOLUTION RESPIRATORY (INHALATION) at 19:33

## 2018-10-29 RX ADMIN — GABAPENTIN 300 MG: 300 CAPSULE ORAL at 20:16

## 2018-10-29 RX ADMIN — ENOXAPARIN SODIUM 40 MG: 40 INJECTION SUBCUTANEOUS at 09:05

## 2018-10-29 RX ADMIN — BACLOFEN 10 MG: 10 TABLET ORAL at 23:32

## 2018-10-29 RX ADMIN — DULOXETINE 60 MG: 60 CAPSULE, DELAYED RELEASE ORAL at 17:09

## 2018-10-29 RX ADMIN — ACETAMINOPHEN AND CODEINE PHOSPHATE 1 TABLET: 300; 30 TABLET ORAL at 15:11

## 2018-10-29 RX ADMIN — NYSTATIN 500000 UNITS: 500000 SUSPENSION ORAL at 16:31

## 2018-10-29 RX ADMIN — METOPROLOL TARTRATE 25 MG: 25 TABLET, FILM COATED ORAL at 09:05

## 2018-10-29 RX ADMIN — METOPROLOL TARTRATE 25 MG: 25 TABLET, FILM COATED ORAL at 15:09

## 2018-10-29 RX ADMIN — GABAPENTIN 200 MG: 100 CAPSULE ORAL at 06:16

## 2018-10-29 RX ADMIN — FLUTICASONE PROPIONATE AND SALMETEROL 1 PUFF: 50; 500 POWDER RESPIRATORY (INHALATION) at 19:33

## 2018-10-29 RX ADMIN — PANTOPRAZOLE SODIUM 40 MG: 40 TABLET, DELAYED RELEASE ORAL at 06:16

## 2018-10-29 ASSESSMENT — PAIN SCALES - GENERAL
PAIN_LEVEL_AT_REST: 5
PAIN_LEVEL_AT_REST: 0
PAIN_LEVEL_WITH_ACTIVITY: 0
PAIN_LEVEL_AT_REST: 3
PAIN_LEVEL_AT_REST: 0
PAIN_LEVEL_WITH_ACTIVITY: 0
PAIN_LEVEL_AT_REST: 2
PAIN_LEVEL_AT_REST: 3

## 2018-10-30 ENCOUNTER — OFF PREMISE CHARGES (OUTPATIENT)
Dept: CARDIOLOGY | Age: 63
End: 2018-10-30

## 2018-10-30 DIAGNOSIS — R06.02 SOB (SHORTNESS OF BREATH) ON EXERTION: ICD-10-CM

## 2018-10-30 PROBLEM — I20.1 PRINZMETAL ANGINA (CMD): Status: RESOLVED | Noted: 2018-07-19 | Resolved: 2018-10-30

## 2018-10-30 LAB
ANION GAP SERPL CALC-SCNC: 8 MMOL/L (ref 10–20)
BASOPHILS # BLD AUTO: 0 K/MCL (ref 0–0.3)
BASOPHILS NFR BLD AUTO: 0 %
BUN SERPL-MCNC: 20 MG/DL (ref 6–20)
BUN/CREAT SERPL: 19 (ref 7–25)
CALCIUM SERPL-MCNC: 8.1 MG/DL (ref 8.4–10.2)
CHLORIDE SERPL-SCNC: 94 MMOL/L (ref 98–107)
CO2 SERPL-SCNC: 33 MMOL/L (ref 21–32)
CREAT SERPL-MCNC: 1.03 MG/DL (ref 0.51–0.95)
DIFFERENTIAL METHOD BLD: ABNORMAL
EOSINOPHIL # BLD AUTO: 0 K/MCL (ref 0.1–0.5)
EOSINOPHIL NFR SPEC: 0 %
ERYTHROCYTE [DISTWIDTH] IN BLOOD: 13.5 % (ref 11–15)
GLUCOSE BLDC GLUCOMTR-MCNC: 142 MG/DL (ref 65–99)
GLUCOSE BLDC GLUCOMTR-MCNC: 224 MG/DL (ref 65–99)
GLUCOSE BLDC GLUCOMTR-MCNC: 264 MG/DL (ref 65–99)
GLUCOSE BLDC GLUCOMTR-MCNC: 99 MG/DL (ref 65–99)
GLUCOSE SERPL-MCNC: 159 MG/DL (ref 65–99)
HCT VFR BLD CALC: 32.6 % (ref 36–46.5)
HGB BLD-MCNC: 11 G/DL (ref 12–15.5)
LYMPHOCYTES # BLD MANUAL: 1.1 K/MCL (ref 1–4)
LYMPHOCYTES NFR BLD MANUAL: 12 %
MCH RBC QN AUTO: 30.1 PG (ref 26–34)
MCHC RBC AUTO-ENTMCNC: 33.7 G/DL (ref 32–36.5)
MCV RBC AUTO: 89.1 FL (ref 78–100)
MONOCYTES # BLD MANUAL: 0.7 K/MCL (ref 0.3–0.9)
MONOCYTES NFR BLD MANUAL: 8 %
NEUTROPHILS # BLD: 7.1 K/MCL (ref 1.8–7.7)
NEUTROPHILS NFR BLD AUTO: 80 %
PLATELET # BLD: 181 K/MCL (ref 140–450)
POTASSIUM SERPL-SCNC: 3.4 MMOL/L (ref 3.4–5.1)
RBC # BLD: 3.66 MIL/MCL (ref 4–5.2)
SODIUM SERPL-SCNC: 132 MMOL/L (ref 135–145)
WBC # BLD: 8.9 K/MCL (ref 4.2–11)

## 2018-10-30 PROCEDURE — 10002803 HB RX 637: Performed by: INTERNAL MEDICINE

## 2018-10-30 PROCEDURE — 10003445 HB TELEMETRY PER DAY

## 2018-10-30 PROCEDURE — 94640 AIRWAY INHALATION TREATMENT: CPT

## 2018-10-30 PROCEDURE — 85025 COMPLETE CBC W/AUTO DIFF WBC: CPT

## 2018-10-30 PROCEDURE — 10004651 HB RX, NO CHARGE ITEM: Performed by: INTERNAL MEDICINE

## 2018-10-30 PROCEDURE — 10000002 HB ROOM CHARGE MED SURG

## 2018-10-30 PROCEDURE — 10002803 HB RX 637: Performed by: HOSPITALIST

## 2018-10-30 PROCEDURE — G0378 HOSPITAL OBSERVATION PER HR: HCPCS

## 2018-10-30 PROCEDURE — 10002801 HB RX 250 W/O HCPCS: Performed by: INTERNAL MEDICINE

## 2018-10-30 PROCEDURE — 99223 1ST HOSP IP/OBS HIGH 75: CPT | Performed by: INTERNAL MEDICINE

## 2018-10-30 PROCEDURE — 82962 GLUCOSE BLOOD TEST: CPT

## 2018-10-30 PROCEDURE — 10002800 HB RX 250 W HCPCS: Performed by: INTERNAL MEDICINE

## 2018-10-30 PROCEDURE — 99232 SBSQ HOSP IP/OBS MODERATE 35: CPT | Performed by: HOSPITALIST

## 2018-10-30 PROCEDURE — 80048 BASIC METABOLIC PNL TOTAL CA: CPT

## 2018-10-30 RX ORDER — LOSARTAN POTASSIUM 50 MG/1
50 TABLET ORAL DAILY
Status: DISCONTINUED | OUTPATIENT
Start: 2018-10-30 | End: 2018-10-31 | Stop reason: HOSPADM

## 2018-10-30 RX ORDER — AZITHROMYCIN 500 MG/1
500 TABLET, FILM COATED ORAL DAILY
Qty: 5 TABLET | Refills: 0 | Status: SHIPPED | OUTPATIENT
Start: 2018-10-31 | End: 2018-10-31

## 2018-10-30 RX ORDER — PREDNISONE 20 MG/1
40 TABLET ORAL
Status: DISCONTINUED | OUTPATIENT
Start: 2018-10-30 | End: 2018-10-31 | Stop reason: HOSPADM

## 2018-10-30 RX ORDER — GUAIFENESIN 600 MG/1
1200 TABLET, EXTENDED RELEASE ORAL EVERY 12 HOURS SCHEDULED
Status: DISCONTINUED | OUTPATIENT
Start: 2018-10-30 | End: 2018-10-31 | Stop reason: HOSPADM

## 2018-10-30 RX ORDER — FUROSEMIDE 40 MG/1
40 TABLET ORAL DAILY
Status: DISCONTINUED | OUTPATIENT
Start: 2018-10-30 | End: 2018-10-31 | Stop reason: HOSPADM

## 2018-10-30 RX ADMIN — GABAPENTIN 200 MG: 100 CAPSULE ORAL at 05:36

## 2018-10-30 RX ADMIN — NYSTATIN 500000 UNITS: 500000 SUSPENSION ORAL at 16:12

## 2018-10-30 RX ADMIN — BENZONATATE 200 MG: 100 CAPSULE ORAL at 20:47

## 2018-10-30 RX ADMIN — CLONAZEPAM 1 MG: 1 TABLET ORAL at 11:57

## 2018-10-30 RX ADMIN — ENOXAPARIN SODIUM 40 MG: 40 INJECTION SUBCUTANEOUS at 09:32

## 2018-10-30 RX ADMIN — BUPROPION HYDROCHLORIDE 300 MG: 150 TABLET, EXTENDED RELEASE ORAL at 09:28

## 2018-10-30 RX ADMIN — AZITHROMYCIN 500 MG: 250 TABLET, FILM COATED ORAL at 09:29

## 2018-10-30 RX ADMIN — GUAIFENESIN AND CODEINE PHOSPHATE 5 ML: 10; 100 LIQUID ORAL at 11:53

## 2018-10-30 RX ADMIN — ISOSORBIDE DINITRATE 10 MG: 10 TABLET ORAL at 09:44

## 2018-10-30 RX ADMIN — PREDNISONE 40 MG: 20 TABLET ORAL at 09:50

## 2018-10-30 RX ADMIN — IPRATROPIUM BROMIDE AND ALBUTEROL SULFATE 3 ML: 2.5; .5 SOLUTION RESPIRATORY (INHALATION) at 00:54

## 2018-10-30 RX ADMIN — TRAMADOL HYDROCHLORIDE 50 MG: 50 TABLET, COATED ORAL at 10:23

## 2018-10-30 RX ADMIN — HYDROXYZINE HYDROCHLORIDE 75 MG: 25 TABLET, FILM COATED ORAL at 20:48

## 2018-10-30 RX ADMIN — IPRATROPIUM BROMIDE AND ALBUTEROL SULFATE 3 ML: 2.5; .5 SOLUTION RESPIRATORY (INHALATION) at 08:11

## 2018-10-30 RX ADMIN — ACETAMINOPHEN AND CODEINE PHOSPHATE 1 TABLET: 300; 30 TABLET ORAL at 14:18

## 2018-10-30 RX ADMIN — NYSTATIN 500000 UNITS: 500000 SUSPENSION ORAL at 09:23

## 2018-10-30 RX ADMIN — LOSARTAN POTASSIUM 50 MG: 50 TABLET ORAL at 12:04

## 2018-10-30 RX ADMIN — BACLOFEN 10 MG: 10 TABLET ORAL at 20:48

## 2018-10-30 RX ADMIN — METOPROLOL TARTRATE 50 MG: 50 TABLET, FILM COATED ORAL at 09:24

## 2018-10-30 RX ADMIN — PANTOPRAZOLE SODIUM 40 MG: 40 TABLET, DELAYED RELEASE ORAL at 05:36

## 2018-10-30 RX ADMIN — PANTOPRAZOLE SODIUM 40 MG: 40 TABLET, DELAYED RELEASE ORAL at 16:12

## 2018-10-30 RX ADMIN — CLONAZEPAM 1 MG: 1 TABLET ORAL at 20:48

## 2018-10-30 RX ADMIN — POTASSIUM CHLORIDE 40 MEQ: 1500 TABLET, EXTENDED RELEASE ORAL at 20:48

## 2018-10-30 RX ADMIN — FUROSEMIDE 40 MG: 40 TABLET ORAL at 16:12

## 2018-10-30 RX ADMIN — LEVOTHYROXINE SODIUM 50 MCG: 50 TABLET ORAL at 05:36

## 2018-10-30 RX ADMIN — DULOXETINE 60 MG: 60 CAPSULE, DELAYED RELEASE ORAL at 18:09

## 2018-10-30 RX ADMIN — LINAGLIPTIN 5 MG: 5 TABLET, FILM COATED ORAL at 09:31

## 2018-10-30 RX ADMIN — NYSTATIN 500000 UNITS: 500000 SUSPENSION ORAL at 20:48

## 2018-10-30 RX ADMIN — NYSTATIN 500000 UNITS: 500000 SUSPENSION ORAL at 14:17

## 2018-10-30 RX ADMIN — FLUTICASONE PROPIONATE AND SALMETEROL 1 PUFF: 50; 500 POWDER RESPIRATORY (INHALATION) at 19:17

## 2018-10-30 RX ADMIN — ISOSORBIDE DINITRATE 10 MG: 10 TABLET ORAL at 14:17

## 2018-10-30 RX ADMIN — GABAPENTIN 200 MG: 100 CAPSULE ORAL at 14:17

## 2018-10-30 RX ADMIN — BUSPIRONE HYDROCHLORIDE 30 MG: 15 TABLET ORAL at 09:27

## 2018-10-30 RX ADMIN — GABAPENTIN 300 MG: 300 CAPSULE ORAL at 20:48

## 2018-10-30 RX ADMIN — DULOXETINE 60 MG: 60 CAPSULE, DELAYED RELEASE ORAL at 09:31

## 2018-10-30 RX ADMIN — LORATADINE 10 MG: 10 TABLET ORAL at 09:31

## 2018-10-30 RX ADMIN — FLUTICASONE PROPIONATE 2 SPRAY: 50 SPRAY, METERED NASAL at 09:32

## 2018-10-30 RX ADMIN — GUAIFENESIN 1200 MG: 600 TABLET, EXTENDED RELEASE ORAL at 20:48

## 2018-10-30 RX ADMIN — SODIUM CHLORIDE, PRESERVATIVE FREE 10 ML: 5 INJECTION INTRAVENOUS at 09:37

## 2018-10-30 RX ADMIN — BUSPIRONE HYDROCHLORIDE 30 MG: 15 TABLET ORAL at 18:09

## 2018-10-30 RX ADMIN — GUAIFENESIN AND CODEINE PHOSPHATE 5 ML: 10; 100 LIQUID ORAL at 05:43

## 2018-10-30 RX ADMIN — IPRATROPIUM BROMIDE AND ALBUTEROL SULFATE 3 ML: 2.5; .5 SOLUTION RESPIRATORY (INHALATION) at 19:17

## 2018-10-30 RX ADMIN — ISOSORBIDE DINITRATE 10 MG: 10 TABLET ORAL at 20:47

## 2018-10-30 RX ADMIN — INSULIN LISPRO 4 UNITS: 100 INJECTION, SOLUTION INTRAVENOUS; SUBCUTANEOUS at 17:00

## 2018-10-30 RX ADMIN — SODIUM CHLORIDE, PRESERVATIVE FREE 2 ML: 5 INJECTION INTRAVENOUS at 20:48

## 2018-10-30 RX ADMIN — FLUTICASONE PROPIONATE AND SALMETEROL 1 PUFF: 50; 500 POWDER RESPIRATORY (INHALATION) at 08:11

## 2018-10-30 ASSESSMENT — PAIN SCALES - GENERAL
PAIN_LEVEL_AT_REST: 0
PAIN_LEVEL_WITH_ACTIVITY: 4
PAIN_LEVEL_AT_REST: 2
PAIN_LEVEL_AT_REST: 6
PAIN_LEVEL_AT_REST: 4

## 2018-10-31 ENCOUNTER — TELEPHONE (OUTPATIENT)
Dept: INTERNAL MEDICINE | Age: 63
End: 2018-10-31

## 2018-10-31 ENCOUNTER — APPOINTMENT (OUTPATIENT)
Dept: REHABILITATION | Age: 63
End: 2018-10-31
Attending: ORTHOPAEDIC SURGERY

## 2018-10-31 VITALS
HEART RATE: 95 BPM | WEIGHT: 257.8 LBS | HEIGHT: 64 IN | DIASTOLIC BLOOD PRESSURE: 67 MMHG | SYSTOLIC BLOOD PRESSURE: 148 MMHG | RESPIRATION RATE: 10 BRPM | OXYGEN SATURATION: 95 % | BODY MASS INDEX: 44.01 KG/M2 | TEMPERATURE: 98.2 F

## 2018-10-31 LAB — GLUCOSE BLDC GLUCOMTR-MCNC: 89 MG/DL (ref 65–99)

## 2018-10-31 PROCEDURE — 10002803 HB RX 637: Performed by: INTERNAL MEDICINE

## 2018-10-31 PROCEDURE — 94640 AIRWAY INHALATION TREATMENT: CPT

## 2018-10-31 PROCEDURE — 10002800 HB RX 250 W HCPCS: Performed by: INTERNAL MEDICINE

## 2018-10-31 PROCEDURE — G0378 HOSPITAL OBSERVATION PER HR: HCPCS

## 2018-10-31 PROCEDURE — 10002803 HB RX 637: Performed by: HOSPITALIST

## 2018-10-31 PROCEDURE — 82962 GLUCOSE BLOOD TEST: CPT

## 2018-10-31 PROCEDURE — 10002800 HB RX 250 W HCPCS: Performed by: HOSPITALIST

## 2018-10-31 PROCEDURE — 10002801 HB RX 250 W/O HCPCS: Performed by: INTERNAL MEDICINE

## 2018-10-31 PROCEDURE — 10004651 HB RX, NO CHARGE ITEM: Performed by: INTERNAL MEDICINE

## 2018-10-31 PROCEDURE — 99239 HOSP IP/OBS DSCHRG MGMT >30: CPT | Performed by: HOSPITALIST

## 2018-10-31 RX ORDER — FUROSEMIDE 40 MG/1
20 TABLET ORAL DAILY
Qty: 30 TABLET | Refills: 1 | Status: SHIPPED | OUTPATIENT
Start: 2018-10-31 | End: 2018-11-05 | Stop reason: DRUGHIGH

## 2018-10-31 RX ORDER — LOSARTAN POTASSIUM 25 MG/1
25 TABLET ORAL DAILY
Qty: 30 TABLET | Refills: 1 | Status: SHIPPED | OUTPATIENT
Start: 2018-10-31 | End: 2018-10-31 | Stop reason: HOSPADM

## 2018-10-31 RX ORDER — AZITHROMYCIN 250 MG/1
500 TABLET, FILM COATED ORAL DAILY
Qty: 10 TABLET | Refills: 0 | Status: SHIPPED | OUTPATIENT
Start: 2018-10-31 | End: 2018-11-05

## 2018-10-31 RX ORDER — FUROSEMIDE 40 MG/1
20 TABLET ORAL DAILY
Qty: 30 TABLET | Refills: 1 | Status: SHIPPED | OUTPATIENT
Start: 2018-10-31 | End: 2018-10-31

## 2018-10-31 RX ORDER — LOSARTAN POTASSIUM 50 MG/1
50 TABLET ORAL DAILY
Qty: 30 TABLET | Refills: 1 | Status: ON HOLD | OUTPATIENT
Start: 2018-10-31 | End: 2018-12-11 | Stop reason: HOSPADM

## 2018-10-31 RX ORDER — FUROSEMIDE 40 MG/1
40 TABLET ORAL DAILY
Qty: 30 TABLET | Refills: 1 | Status: SHIPPED | OUTPATIENT
Start: 2018-10-31 | End: 2018-10-31

## 2018-10-31 RX ADMIN — ENOXAPARIN SODIUM 40 MG: 40 INJECTION SUBCUTANEOUS at 08:04

## 2018-10-31 RX ADMIN — BUSPIRONE HYDROCHLORIDE 30 MG: 15 TABLET ORAL at 08:01

## 2018-10-31 RX ADMIN — TRAMADOL HYDROCHLORIDE 50 MG: 50 TABLET, COATED ORAL at 02:06

## 2018-10-31 RX ADMIN — LOSARTAN POTASSIUM 50 MG: 50 TABLET ORAL at 08:01

## 2018-10-31 RX ADMIN — LINAGLIPTIN 5 MG: 5 TABLET, FILM COATED ORAL at 08:01

## 2018-10-31 RX ADMIN — BUPROPION HYDROCHLORIDE 300 MG: 150 TABLET, EXTENDED RELEASE ORAL at 08:00

## 2018-10-31 RX ADMIN — TIOTROPIUM BROMIDE 18 MCG: 18 CAPSULE ORAL; RESPIRATORY (INHALATION) at 07:31

## 2018-10-31 RX ADMIN — ISOSORBIDE DINITRATE 10 MG: 10 TABLET ORAL at 08:01

## 2018-10-31 RX ADMIN — LEVOTHYROXINE SODIUM 50 MCG: 50 TABLET ORAL at 05:23

## 2018-10-31 RX ADMIN — FLUTICASONE PROPIONATE 2 SPRAY: 50 SPRAY, METERED NASAL at 08:04

## 2018-10-31 RX ADMIN — NYSTATIN 500000 UNITS: 500000 SUSPENSION ORAL at 08:03

## 2018-10-31 RX ADMIN — GABAPENTIN 200 MG: 100 CAPSULE ORAL at 05:23

## 2018-10-31 RX ADMIN — GUAIFENESIN AND CODEINE PHOSPHATE 5 ML: 10; 100 LIQUID ORAL at 04:21

## 2018-10-31 RX ADMIN — SODIUM CHLORIDE, PRESERVATIVE FREE 10 ML: 5 INJECTION INTRAVENOUS at 08:18

## 2018-10-31 RX ADMIN — FLUTICASONE PROPIONATE AND SALMETEROL 1 PUFF: 50; 500 POWDER RESPIRATORY (INHALATION) at 07:25

## 2018-10-31 RX ADMIN — PANTOPRAZOLE SODIUM 40 MG: 40 TABLET, DELAYED RELEASE ORAL at 05:23

## 2018-10-31 RX ADMIN — BENZONATATE 200 MG: 100 CAPSULE ORAL at 05:23

## 2018-10-31 RX ADMIN — LORATADINE 10 MG: 10 TABLET ORAL at 08:01

## 2018-10-31 RX ADMIN — IPRATROPIUM BROMIDE AND ALBUTEROL SULFATE 3 ML: 2.5; .5 SOLUTION RESPIRATORY (INHALATION) at 00:27

## 2018-10-31 RX ADMIN — FUROSEMIDE 40 MG: 40 TABLET ORAL at 08:01

## 2018-10-31 RX ADMIN — GUAIFENESIN 1200 MG: 600 TABLET, EXTENDED RELEASE ORAL at 08:01

## 2018-10-31 RX ADMIN — AZITHROMYCIN 500 MG: 250 TABLET, FILM COATED ORAL at 08:00

## 2018-10-31 RX ADMIN — DULOXETINE 60 MG: 60 CAPSULE, DELAYED RELEASE ORAL at 08:00

## 2018-10-31 RX ADMIN — SODIUM CHLORIDE, PRESERVATIVE FREE 10 ML: 5 INJECTION INTRAVENOUS at 09:17

## 2018-10-31 RX ADMIN — PREDNISONE 40 MG: 20 TABLET ORAL at 08:01

## 2018-10-31 RX ADMIN — SODIUM CHLORIDE, PRESERVATIVE FREE 500 UNITS: 5 INJECTION INTRAVENOUS at 09:18

## 2018-10-31 ASSESSMENT — PAIN SCALES - GENERAL
PAIN_LEVEL_AT_REST: 4
PAIN_LEVEL_AT_REST: 3

## 2018-11-01 ENCOUNTER — TELEPHONE (OUTPATIENT)
Dept: NEUROLOGY | Age: 63
End: 2018-11-01

## 2018-11-01 ENCOUNTER — TELEPHONE (OUTPATIENT)
Dept: INTERNAL MEDICINE | Age: 63
End: 2018-11-01

## 2018-11-01 ENCOUNTER — PATIENT OUTREACH (OUTPATIENT)
Dept: CASE MANAGEMENT | Age: 63
End: 2018-11-01

## 2018-11-01 RX ORDER — BUTALBITAL, ACETAMINOPHEN AND CAFFEINE 50; 325; 40 MG/1; MG/1; MG/1
1 TABLET ORAL EVERY 4 HOURS PRN
Qty: 30 TABLET | Refills: 0 | Status: SHIPPED | OUTPATIENT
Start: 2018-11-01 | End: 2019-06-18 | Stop reason: SDUPTHER

## 2018-11-05 ENCOUNTER — TELEPHONE (OUTPATIENT)
Dept: CARDIOLOGY | Age: 63
End: 2018-11-05

## 2018-11-05 ENCOUNTER — PATIENT OUTREACH (OUTPATIENT)
Dept: CASE MANAGEMENT | Age: 63
End: 2018-11-05

## 2018-11-05 ENCOUNTER — TELEPHONE (OUTPATIENT)
Dept: INTERNAL MEDICINE | Age: 63
End: 2018-11-05

## 2018-11-05 ENCOUNTER — TELEPHONE (OUTPATIENT)
Dept: PULMONOLOGY | Age: 63
End: 2018-11-05

## 2018-11-05 RX ORDER — FUROSEMIDE 40 MG/1
40 TABLET ORAL 2 TIMES DAILY
Qty: 30 TABLET | Refills: 1 | Status: SHIPPED | COMMUNITY
Start: 2018-11-05 | End: 2018-11-08 | Stop reason: SDUPTHER

## 2018-11-05 RX ORDER — CODEINE PHOSPHATE AND GUAIFENESIN 10; 100 MG/5ML; MG/5ML
5 SOLUTION ORAL 3 TIMES DAILY PRN
Qty: 240 ML | Refills: 0 | Status: SHIPPED | OUTPATIENT
Start: 2018-11-05 | End: 2019-05-03 | Stop reason: SDUPTHER

## 2018-11-06 ENCOUNTER — OFFICE VISIT (OUTPATIENT)
Dept: INTERNAL MEDICINE | Age: 63
End: 2018-11-06

## 2018-11-06 VITALS
HEART RATE: 97 BPM | DIASTOLIC BLOOD PRESSURE: 70 MMHG | SYSTOLIC BLOOD PRESSURE: 138 MMHG | RESPIRATION RATE: 16 BRPM | OXYGEN SATURATION: 98 %

## 2018-11-06 DIAGNOSIS — J98.01 ACUTE BRONCHOSPASM: ICD-10-CM

## 2018-11-06 DIAGNOSIS — J45.909 INTRINSIC ASTHMA: Primary | ICD-10-CM

## 2018-11-06 DIAGNOSIS — I25.10 CORONARY ARTERY DISEASE INVOLVING NATIVE CORONARY ARTERY OF NATIVE HEART WITHOUT ANGINA PECTORIS: ICD-10-CM

## 2018-11-06 DIAGNOSIS — E11.9 TYPE 2 DIABETES MELLITUS WITHOUT COMPLICATION, WITHOUT LONG-TERM CURRENT USE OF INSULIN (CMD): ICD-10-CM

## 2018-11-06 DIAGNOSIS — N18.30 CKD (CHRONIC KIDNEY DISEASE), STAGE III (CMD): ICD-10-CM

## 2018-11-06 DIAGNOSIS — I47.11 INAPPROPRIATE SINUS TACHYCARDIA: ICD-10-CM

## 2018-11-06 PROCEDURE — 99214 OFFICE O/P EST MOD 30 MIN: CPT | Performed by: INTERNAL MEDICINE

## 2018-11-06 RX ORDER — FLUCONAZOLE 100 MG/1
100 TABLET ORAL DAILY
Qty: 7 TABLET | Refills: 0 | Status: SHIPPED | OUTPATIENT
Start: 2018-11-06 | End: 2019-01-02

## 2018-11-06 RX ORDER — ERENUMAB-AOOE 70 MG/ML
INJECTION SUBCUTANEOUS
Refills: 11 | COMMUNITY
Start: 2018-10-31 | End: 2019-08-22 | Stop reason: SDUPTHER

## 2018-11-08 ENCOUNTER — TELEPHONE (OUTPATIENT)
Dept: INTERNAL MEDICINE | Age: 63
End: 2018-11-08

## 2018-11-08 DIAGNOSIS — E11.9 TYPE 2 DIABETES MELLITUS WITHOUT COMPLICATION, WITHOUT LONG-TERM CURRENT USE OF INSULIN (CMD): Primary | ICD-10-CM

## 2018-11-08 RX ORDER — POTASSIUM CITRATE 10 MEQ/1
2160 TABLET, EXTENDED RELEASE ORAL
Qty: 180 TABLET | Refills: 0 | Status: SHIPPED | OUTPATIENT
Start: 2018-11-08 | End: 2018-11-28 | Stop reason: ALTCHOICE

## 2018-11-08 RX ORDER — FUROSEMIDE 40 MG/1
40 TABLET ORAL 2 TIMES DAILY
Qty: 20 TABLET | Refills: 0 | Status: SHIPPED | OUTPATIENT
Start: 2018-11-08 | End: 2018-11-13 | Stop reason: SDUPTHER

## 2018-11-08 RX ORDER — FUROSEMIDE 40 MG/1
40 TABLET ORAL 2 TIMES DAILY
Qty: 90 TABLET | Refills: 0 | Status: SHIPPED | OUTPATIENT
Start: 2018-11-08 | End: 2019-05-14 | Stop reason: SDUPTHER

## 2018-11-09 ENCOUNTER — TELEPHONE (OUTPATIENT)
Dept: OTHER | Age: 63
End: 2018-11-09

## 2018-11-09 ENCOUNTER — TELEPHONE (OUTPATIENT)
Dept: INTERNAL MEDICINE | Age: 63
End: 2018-11-09

## 2018-11-09 DIAGNOSIS — E11.9 TYPE 2 DIABETES MELLITUS WITHOUT COMPLICATION, WITHOUT LONG-TERM CURRENT USE OF INSULIN (CMD): Primary | ICD-10-CM

## 2018-11-09 RX ORDER — POTASSIUM CHLORIDE 750 MG/1
20 TABLET, FILM COATED, EXTENDED RELEASE ORAL DAILY
Qty: 180 TABLET | Refills: 0 | Status: SHIPPED | COMMUNITY
Start: 2018-11-09 | End: 2018-12-26 | Stop reason: SDUPTHER

## 2018-11-09 RX ORDER — FLASH GLUCOSE SENSOR
KIT MISCELLANEOUS
Qty: 1 EACH | Refills: 0 | Status: SHIPPED | OUTPATIENT
Start: 2018-11-09 | End: 2018-11-13

## 2018-11-09 RX ORDER — FLASH GLUCOSE SENSOR
KIT MISCELLANEOUS
Qty: 1 DEVICE | Refills: 0 | Status: SHIPPED | OUTPATIENT
Start: 2018-11-09 | End: 2018-11-13

## 2018-11-13 ENCOUNTER — TELEPHONE (OUTPATIENT)
Dept: INTERNAL MEDICINE | Age: 63
End: 2018-11-13

## 2018-11-13 ENCOUNTER — PATIENT OUTREACH (OUTPATIENT)
Dept: CASE MANAGEMENT | Age: 63
End: 2018-11-13

## 2018-11-14 ENCOUNTER — TELEPHONE (OUTPATIENT)
Dept: INTERNAL MEDICINE | Age: 63
End: 2018-11-14

## 2018-11-14 RX ORDER — AZITHROMYCIN 250 MG/1
1000 TABLET, FILM COATED ORAL
Qty: 4 TABLET | Refills: 1 | Status: SHIPPED | OUTPATIENT
Start: 2018-11-14 | End: 2018-11-28 | Stop reason: SDUPTHER

## 2018-11-19 ENCOUNTER — E-ADVICE (OUTPATIENT)
Dept: ALLERGY | Age: 63
End: 2018-11-19

## 2018-11-19 RX ORDER — ACETAMINOPHEN 325 MG/1
650 TABLET ORAL ONCE
Status: CANCELLED | OUTPATIENT
Start: 2018-11-19 | End: 2018-11-19

## 2018-11-19 RX ORDER — DIPHENHYDRAMINE HCL 25 MG
25 CAPSULE ORAL ONCE
Status: CANCELLED | OUTPATIENT
Start: 2018-11-19 | End: 2018-11-19

## 2018-11-20 ENCOUNTER — TELEPHONE (OUTPATIENT)
Dept: ALLERGY | Age: 63
End: 2018-11-20

## 2018-11-20 ENCOUNTER — PATIENT OUTREACH (OUTPATIENT)
Dept: CASE MANAGEMENT | Age: 63
End: 2018-11-20

## 2018-11-21 ENCOUNTER — TELEPHONE (OUTPATIENT)
Dept: ALLERGY | Age: 63
End: 2018-11-21

## 2018-11-23 LAB
IGA SERPL-MCNC: 206 MG/DL (ref 87–352)
IGG SERPL-MCNC: 960 MG/DL (ref 700–1600)
IGM SERPL-MCNC: 44 MG/DL (ref 26–217)

## 2018-11-24 DIAGNOSIS — I20.1 PRINZMETAL ANGINA (CMD): ICD-10-CM

## 2018-11-24 DIAGNOSIS — I25.10 CORONARY ARTERY DISEASE INVOLVING NATIVE CORONARY ARTERY OF NATIVE HEART WITHOUT ANGINA PECTORIS: ICD-10-CM

## 2018-11-26 RX ORDER — ISOSORBIDE DINITRATE 10 MG/1
10 TABLET ORAL 3 TIMES DAILY
Qty: 270 TABLET | Refills: 0 | Status: SHIPPED | OUTPATIENT
Start: 2018-11-26 | End: 2019-02-09 | Stop reason: SDUPTHER

## 2018-11-28 ENCOUNTER — OFFICE VISIT (OUTPATIENT)
Dept: INTERNAL MEDICINE | Age: 63
End: 2018-11-28

## 2018-11-28 ENCOUNTER — TELEPHONE (OUTPATIENT)
Dept: INTERNAL MEDICINE | Age: 63
End: 2018-11-28

## 2018-11-28 VITALS
BODY MASS INDEX: 43.77 KG/M2 | OXYGEN SATURATION: 94 % | RESPIRATION RATE: 16 BRPM | SYSTOLIC BLOOD PRESSURE: 140 MMHG | WEIGHT: 255 LBS | HEART RATE: 94 BPM | DIASTOLIC BLOOD PRESSURE: 94 MMHG

## 2018-11-28 DIAGNOSIS — J32.0 CHRONIC MAXILLARY SINUSITIS: ICD-10-CM

## 2018-11-28 DIAGNOSIS — E11.9 TYPE 2 DIABETES MELLITUS WITHOUT COMPLICATION, WITHOUT LONG-TERM CURRENT USE OF INSULIN (CMD): Primary | ICD-10-CM

## 2018-11-28 DIAGNOSIS — I25.10 CORONARY ARTERY DISEASE INVOLVING NATIVE CORONARY ARTERY OF NATIVE HEART WITHOUT ANGINA PECTORIS: ICD-10-CM

## 2018-11-28 DIAGNOSIS — I10 ESSENTIAL HYPERTENSION WITH GOAL BLOOD PRESSURE LESS THAN 140/90: ICD-10-CM

## 2018-11-28 PROCEDURE — 99214 OFFICE O/P EST MOD 30 MIN: CPT | Performed by: INTERNAL MEDICINE

## 2018-11-28 RX ORDER — POTASSIUM CITRATE 10 MEQ/1
1080 TABLET, EXTENDED RELEASE ORAL DAILY
Qty: 90 TABLET | Status: CANCELLED | OUTPATIENT
Start: 2018-11-28

## 2018-11-28 RX ORDER — AZITHROMYCIN 250 MG/1
1000 TABLET, FILM COATED ORAL
Qty: 4 TABLET | Refills: 1 | Status: SHIPPED | OUTPATIENT
Start: 2018-11-28 | End: 2018-12-12 | Stop reason: SDUPTHER

## 2018-11-28 RX ORDER — POTASSIUM CITRATE 10 MEQ/1
1080 TABLET, EXTENDED RELEASE ORAL DAILY
Status: SHIPPED | COMMUNITY
Start: 2018-11-28 | End: 2018-11-29 | Stop reason: SDUPTHER

## 2018-11-28 RX ORDER — CLARITHROMYCIN 500 MG/1
500 TABLET, COATED ORAL 2 TIMES DAILY
Qty: 20 TABLET | Refills: 0 | Status: ON HOLD | OUTPATIENT
Start: 2018-11-28 | End: 2018-12-11 | Stop reason: HOSPADM

## 2018-11-29 ENCOUNTER — TELEPHONE (OUTPATIENT)
Dept: INTERNAL MEDICINE | Age: 63
End: 2018-11-29

## 2018-11-29 RX ORDER — POTASSIUM CITRATE 10 MEQ/1
1080 TABLET, EXTENDED RELEASE ORAL DAILY
Qty: 90 TABLET | Refills: 1 | Status: ON HOLD | OUTPATIENT
Start: 2018-11-29 | End: 2019-06-16 | Stop reason: HOSPADM

## 2018-11-30 ENCOUNTER — PATIENT OUTREACH (OUTPATIENT)
Dept: CASE MANAGEMENT | Age: 63
End: 2018-11-30

## 2018-12-03 ENCOUNTER — E-ADVICE (OUTPATIENT)
Dept: INTERNAL MEDICINE | Age: 63
End: 2018-12-03

## 2018-12-05 ENCOUNTER — TELEPHONE (OUTPATIENT)
Dept: ALLERGY | Age: 63
End: 2018-12-05

## 2018-12-05 ENCOUNTER — OFFICE VISIT (OUTPATIENT)
Dept: ALLERGY | Age: 63
End: 2018-12-05

## 2018-12-05 VITALS
BODY MASS INDEX: 43.71 KG/M2 | RESPIRATION RATE: 18 BRPM | SYSTOLIC BLOOD PRESSURE: 126 MMHG | HEART RATE: 102 BPM | DIASTOLIC BLOOD PRESSURE: 74 MMHG | WEIGHT: 254.63 LBS | TEMPERATURE: 98.4 F | OXYGEN SATURATION: 96 %

## 2018-12-05 DIAGNOSIS — J32.2 CHRONIC ETHMOIDAL SINUSITIS: ICD-10-CM

## 2018-12-05 DIAGNOSIS — D83.9 CVID (COMMON VARIABLE IMMUNODEFICIENCY) (CMD): Primary | ICD-10-CM

## 2018-12-05 PROCEDURE — 99213 OFFICE O/P EST LOW 20 MIN: CPT | Performed by: ALLERGY & IMMUNOLOGY

## 2018-12-09 ENCOUNTER — APPOINTMENT (OUTPATIENT)
Dept: CT IMAGING | Age: 63
DRG: 202 | End: 2018-12-09
Attending: EMERGENCY MEDICINE

## 2018-12-09 ENCOUNTER — HOSPITAL ENCOUNTER (INPATIENT)
Age: 63
LOS: 2 days | Discharge: HOME | DRG: 202 | End: 2018-12-11
Attending: EMERGENCY MEDICINE | Admitting: INTERNAL MEDICINE

## 2018-12-09 ENCOUNTER — APPOINTMENT (OUTPATIENT)
Dept: GENERAL RADIOLOGY | Age: 63
DRG: 202 | End: 2018-12-09
Attending: EMERGENCY MEDICINE

## 2018-12-09 DIAGNOSIS — J45.51 SEVERE PERSISTENT ASTHMA WITH EXACERBATION: ICD-10-CM

## 2018-12-09 DIAGNOSIS — N28.9 ACUTE RENAL INSUFFICIENCY: ICD-10-CM

## 2018-12-09 DIAGNOSIS — R06.02 SHORTNESS OF BREATH: Primary | ICD-10-CM

## 2018-12-09 LAB
ALBUMIN SERPL-MCNC: 3.7 G/DL (ref 3.6–5.1)
ALBUMIN/GLOB SERPL: 1 {RATIO} (ref 1–2.4)
ALP SERPL-CCNC: 84 UNITS/L (ref 45–117)
ALT SERPL-CCNC: 32 UNITS/L
ANION GAP SERPL CALC-SCNC: 13 MMOL/L (ref 10–20)
APTT PPP: 27 SEC (ref 22–32)
AST SERPL-CCNC: 31 UNITS/L
BASOPHILS # BLD AUTO: 0 K/MCL (ref 0–0.3)
BASOPHILS NFR BLD AUTO: 0 %
BILIRUB SERPL-MCNC: 0.5 MG/DL (ref 0.2–1)
BUN SERPL-MCNC: 22 MG/DL (ref 6–20)
BUN/CREAT SERPL: 12 (ref 7–25)
CALCIUM SERPL-MCNC: 8.7 MG/DL (ref 8.4–10.2)
CHLORIDE SERPL-SCNC: 98 MMOL/L (ref 98–107)
CO2 SERPL-SCNC: 26 MMOL/L (ref 21–32)
CREAT SERPL-MCNC: 1.9 MG/DL (ref 0.51–0.95)
D DIMER PPP FEU-MCNC: 0.86 MG/L (FEU)
DIFFERENTIAL METHOD BLD: ABNORMAL
EOSINOPHIL # BLD AUTO: 0 K/MCL (ref 0.1–0.5)
EOSINOPHIL NFR SPEC: 0 %
ERYTHROCYTE [DISTWIDTH] IN BLOOD: 13.6 % (ref 11–15)
GLOBULIN SER-MCNC: 3.7 G/DL (ref 2–4)
GLUCOSE BLDC GLUCOMTR-MCNC: 174 MG/DL (ref 65–99)
GLUCOSE BLDC GLUCOMTR-MCNC: 201 MG/DL (ref 65–99)
GLUCOSE BLDC GLUCOMTR-MCNC: 212 MG/DL (ref 65–99)
GLUCOSE SERPL-MCNC: 204 MG/DL (ref 65–99)
HCT VFR BLD CALC: 36.1 % (ref 36–46.5)
HGB BLD-MCNC: 12.1 G/DL (ref 12–15.5)
INR PPP: 1
LYMPHOCYTES # BLD MANUAL: 0.7 K/MCL (ref 1–4)
LYMPHOCYTES NFR BLD MANUAL: 8 %
MCH RBC QN AUTO: 30.5 PG (ref 26–34)
MCHC RBC AUTO-ENTMCNC: 33.5 G/DL (ref 32–36.5)
MCV RBC AUTO: 90.9 FL (ref 78–100)
MONOCYTES # BLD MANUAL: 0.5 K/MCL (ref 0.3–0.9)
MONOCYTES NFR BLD MANUAL: 6 %
NEUTROPHILS # BLD: 7.3 K/MCL (ref 1.8–7.7)
NEUTROPHILS NFR BLD AUTO: 86 %
NT-PROBNP SERPL-MCNC: 208 PG/ML
PLATELET # BLD: 243 K/MCL (ref 140–450)
POTASSIUM SERPL-SCNC: 4.2 MMOL/L (ref 3.4–5.1)
PROCALCITONIN SERPL-MCNC: <0.05 NG/ML
PROT SERPL-MCNC: 7.4 G/DL (ref 6.4–8.2)
PROTHROMBIN TIME: 10.2 SEC (ref 9.7–11.8)
RBC # BLD: 3.97 MIL/MCL (ref 4–5.2)
S PYO AG THROAT QL: NEGATIVE
SODIUM SERPL-SCNC: 133 MMOL/L (ref 135–145)
TROPONIN I SERPL-MCNC: 0.03 NG/ML
WBC # BLD: 8.4 K/MCL (ref 4.2–11)

## 2018-12-09 PROCEDURE — 85025 COMPLETE CBC W/AUTO DIFF WBC: CPT

## 2018-12-09 PROCEDURE — 10002800 HB RX 250 W HCPCS: Performed by: EMERGENCY MEDICINE

## 2018-12-09 PROCEDURE — 84484 ASSAY OF TROPONIN QUANT: CPT

## 2018-12-09 PROCEDURE — 94640 AIRWAY INHALATION TREATMENT: CPT

## 2018-12-09 PROCEDURE — 83880 ASSAY OF NATRIURETIC PEPTIDE: CPT

## 2018-12-09 PROCEDURE — 10002800 HB RX 250 W HCPCS: Performed by: INTERNAL MEDICINE

## 2018-12-09 PROCEDURE — 10002807 HB RX 258: Performed by: EMERGENCY MEDICINE

## 2018-12-09 PROCEDURE — 85730 THROMBOPLASTIN TIME PARTIAL: CPT

## 2018-12-09 PROCEDURE — 10000002 HB ROOM CHARGE MED SURG

## 2018-12-09 PROCEDURE — 10003445 HB TELEMETRY PER DAY

## 2018-12-09 PROCEDURE — 96375 TX/PRO/DX INJ NEW DRUG ADDON: CPT

## 2018-12-09 PROCEDURE — 84145 PROCALCITONIN (PCT): CPT

## 2018-12-09 PROCEDURE — 96365 THER/PROPH/DIAG IV INF INIT: CPT

## 2018-12-09 PROCEDURE — 71045 X-RAY EXAM CHEST 1 VIEW: CPT

## 2018-12-09 PROCEDURE — 99222 1ST HOSP IP/OBS MODERATE 55: CPT | Performed by: INTERNAL MEDICINE

## 2018-12-09 PROCEDURE — 10002807 HB RX 258: Performed by: RADIOLOGY

## 2018-12-09 PROCEDURE — 71275 CT ANGIOGRAPHY CHEST: CPT

## 2018-12-09 PROCEDURE — 93005 ELECTROCARDIOGRAM TRACING: CPT | Performed by: EMERGENCY MEDICINE

## 2018-12-09 PROCEDURE — 94644 CONT INHLJ TX 1ST HOUR: CPT

## 2018-12-09 PROCEDURE — 87880 STREP A ASSAY W/OPTIC: CPT

## 2018-12-09 PROCEDURE — 80053 COMPREHEN METABOLIC PANEL: CPT

## 2018-12-09 PROCEDURE — 10002807 HB RX 258: Performed by: INTERNAL MEDICINE

## 2018-12-09 PROCEDURE — 71045 X-RAY EXAM CHEST 1 VIEW: CPT | Performed by: RADIOLOGY

## 2018-12-09 PROCEDURE — 99285 EMERGENCY DEPT VISIT HI MDM: CPT

## 2018-12-09 PROCEDURE — 93010 ELECTROCARDIOGRAM REPORT: CPT | Performed by: INTERNAL MEDICINE

## 2018-12-09 PROCEDURE — 10002805 HB CONTRAST AGENT: Performed by: RADIOLOGY

## 2018-12-09 PROCEDURE — 99285 EMERGENCY DEPT VISIT HI MDM: CPT | Performed by: EMERGENCY MEDICINE

## 2018-12-09 PROCEDURE — G0378 HOSPITAL OBSERVATION PER HR: HCPCS

## 2018-12-09 PROCEDURE — 36415 COLL VENOUS BLD VENIPUNCTURE: CPT

## 2018-12-09 PROCEDURE — 10004651 HB RX, NO CHARGE ITEM: Performed by: RADIOLOGY

## 2018-12-09 PROCEDURE — 85379 FIBRIN DEGRADATION QUANT: CPT

## 2018-12-09 PROCEDURE — 85610 PROTHROMBIN TIME: CPT

## 2018-12-09 PROCEDURE — 10002803 HB RX 637: Performed by: INTERNAL MEDICINE

## 2018-12-09 PROCEDURE — 71275 CT ANGIOGRAPHY CHEST: CPT | Performed by: RADIOLOGY

## 2018-12-09 PROCEDURE — 87081 CULTURE SCREEN ONLY: CPT

## 2018-12-09 PROCEDURE — 82962 GLUCOSE BLOOD TEST: CPT

## 2018-12-09 PROCEDURE — 10002801 HB RX 250 W/O HCPCS: Performed by: INTERNAL MEDICINE

## 2018-12-09 PROCEDURE — 87633 RESP VIRUS 12-25 TARGETS: CPT

## 2018-12-09 PROCEDURE — 10002801 HB RX 250 W/O HCPCS: Performed by: EMERGENCY MEDICINE

## 2018-12-09 PROCEDURE — 87077 CULTURE AEROBIC IDENTIFY: CPT

## 2018-12-09 RX ORDER — ISOSORBIDE DINITRATE 10 MG/1
10 TABLET ORAL 3 TIMES DAILY
Status: DISCONTINUED | OUTPATIENT
Start: 2018-12-09 | End: 2018-12-11 | Stop reason: HOSPADM

## 2018-12-09 RX ORDER — MAGNESIUM SULFATE 1 G/100ML
1 INJECTION INTRAVENOUS ONCE
Status: COMPLETED | OUTPATIENT
Start: 2018-12-09 | End: 2018-12-09

## 2018-12-09 RX ORDER — BISACODYL 10 MG
10 SUPPOSITORY, RECTAL RECTAL DAILY PRN
Status: DISCONTINUED | OUTPATIENT
Start: 2018-12-09 | End: 2018-12-11 | Stop reason: HOSPADM

## 2018-12-09 RX ORDER — DEXTROSE MONOHYDRATE 25 G/50ML
25 INJECTION, SOLUTION INTRAVENOUS PRN
Status: DISCONTINUED | OUTPATIENT
Start: 2018-12-09 | End: 2018-12-11 | Stop reason: HOSPADM

## 2018-12-09 RX ORDER — FERROUS SULFATE 325(65) MG
325 TABLET ORAL 2 TIMES DAILY
Status: DISCONTINUED | OUTPATIENT
Start: 2018-12-09 | End: 2018-12-11 | Stop reason: HOSPADM

## 2018-12-09 RX ORDER — MAGNESIUM L-LACTATE 84 MG
84 TABLET, EXTENDED RELEASE ORAL
Status: DISCONTINUED | OUTPATIENT
Start: 2018-12-09 | End: 2018-12-11 | Stop reason: HOSPADM

## 2018-12-09 RX ORDER — HEPARIN SODIUM 5000 [USP'U]/ML
5000 INJECTION, SOLUTION INTRAVENOUS; SUBCUTANEOUS EVERY 8 HOURS SCHEDULED
Status: DISCONTINUED | OUTPATIENT
Start: 2018-12-09 | End: 2018-12-11 | Stop reason: HOSPADM

## 2018-12-09 RX ORDER — FAMOTIDINE 20 MG/1
20 TABLET, FILM COATED ORAL DAILY
Status: DISCONTINUED | OUTPATIENT
Start: 2018-12-09 | End: 2018-12-11 | Stop reason: HOSPADM

## 2018-12-09 RX ORDER — BACLOFEN 10 MG/1
10 TABLET ORAL 3 TIMES DAILY PRN
Status: DISCONTINUED | OUTPATIENT
Start: 2018-12-09 | End: 2018-12-11 | Stop reason: HOSPADM

## 2018-12-09 RX ORDER — POTASSIUM CHLORIDE 10 MEQ
20 TABLET, EXT RELEASE, PARTICLES/CRYSTALS ORAL DAILY
Status: DISCONTINUED | OUTPATIENT
Start: 2018-12-09 | End: 2018-12-11 | Stop reason: HOSPADM

## 2018-12-09 RX ORDER — ONDANSETRON 2 MG/ML
4 INJECTION INTRAMUSCULAR; INTRAVENOUS 2 TIMES DAILY PRN
Status: DISCONTINUED | OUTPATIENT
Start: 2018-12-09 | End: 2018-12-11 | Stop reason: HOSPADM

## 2018-12-09 RX ORDER — DEXTROSE MONOHYDRATE 50 MG/ML
INJECTION, SOLUTION INTRAVENOUS CONTINUOUS PRN
Status: DISCONTINUED | OUTPATIENT
Start: 2018-12-09 | End: 2018-12-11 | Stop reason: HOSPADM

## 2018-12-09 RX ORDER — TIOTROPIUM BROMIDE 18 UG/1
18 CAPSULE ORAL; RESPIRATORY (INHALATION)
Status: DISCONTINUED | OUTPATIENT
Start: 2018-12-09 | End: 2018-12-11 | Stop reason: HOSPADM

## 2018-12-09 RX ORDER — BENZONATATE 100 MG/1
100-200 CAPSULE ORAL 3 TIMES DAILY PRN
Status: DISCONTINUED | OUTPATIENT
Start: 2018-12-09 | End: 2018-12-11 | Stop reason: HOSPADM

## 2018-12-09 RX ORDER — IPRATROPIUM BROMIDE AND ALBUTEROL SULFATE 2.5; .5 MG/3ML; MG/3ML
3 SOLUTION RESPIRATORY (INHALATION) EVERY 4 HOURS PRN
Status: DISCONTINUED | OUTPATIENT
Start: 2018-12-09 | End: 2018-12-11 | Stop reason: HOSPADM

## 2018-12-09 RX ORDER — LORATADINE 10 MG/1
10 TABLET ORAL DAILY
Status: DISCONTINUED | OUTPATIENT
Start: 2018-12-10 | End: 2018-12-11 | Stop reason: HOSPADM

## 2018-12-09 RX ORDER — DIPHENHYDRAMINE HCL 25 MG
50 CAPSULE ORAL NIGHTLY PRN
Status: DISCONTINUED | OUTPATIENT
Start: 2018-12-09 | End: 2018-12-11 | Stop reason: HOSPADM

## 2018-12-09 RX ORDER — FUROSEMIDE 40 MG/1
40 TABLET ORAL 2 TIMES DAILY
Status: DISCONTINUED | OUTPATIENT
Start: 2018-12-10 | End: 2018-12-11 | Stop reason: HOSPADM

## 2018-12-09 RX ORDER — BUTALBITAL, ACETAMINOPHEN AND CAFFEINE 50; 325; 40 MG/1; MG/1; MG/1
1 TABLET ORAL EVERY 4 HOURS PRN
Status: DISCONTINUED | OUTPATIENT
Start: 2018-12-09 | End: 2018-12-11 | Stop reason: HOSPADM

## 2018-12-09 RX ORDER — FLUTICASONE PROPIONATE 50 MCG
2 SPRAY, SUSPENSION (ML) NASAL DAILY
Status: DISCONTINUED | OUTPATIENT
Start: 2018-12-10 | End: 2018-12-11 | Stop reason: HOSPADM

## 2018-12-09 RX ORDER — MAGNESIUM HYDROXIDE/ALUMINUM HYDROXICE/SIMETHICONE 120; 1200; 1200 MG/30ML; MG/30ML; MG/30ML
30 SUSPENSION ORAL EVERY 4 HOURS PRN
Status: DISCONTINUED | OUTPATIENT
Start: 2018-12-09 | End: 2018-12-11 | Stop reason: HOSPADM

## 2018-12-09 RX ORDER — METHYLPREDNISOLONE SODIUM SUCCINATE 125 MG/2ML
60 INJECTION, POWDER, LYOPHILIZED, FOR SOLUTION INTRAMUSCULAR; INTRAVENOUS EVERY 12 HOURS SCHEDULED
Status: DISCONTINUED | OUTPATIENT
Start: 2018-12-10 | End: 2018-12-10

## 2018-12-09 RX ORDER — BUSPIRONE HYDROCHLORIDE 15 MG/1
30 TABLET ORAL 2 TIMES DAILY
Status: DISCONTINUED | OUTPATIENT
Start: 2018-12-09 | End: 2018-12-11 | Stop reason: HOSPADM

## 2018-12-09 RX ORDER — FLUTICASONE PROPIONATE AND SALMETEROL 500; 50 UG/1; UG/1
1 POWDER RESPIRATORY (INHALATION)
Status: DISCONTINUED | OUTPATIENT
Start: 2018-12-09 | End: 2018-12-11 | Stop reason: HOSPADM

## 2018-12-09 RX ORDER — GABAPENTIN 300 MG/1
300 CAPSULE ORAL NIGHTLY
Status: DISCONTINUED | OUTPATIENT
Start: 2018-12-09 | End: 2018-12-11 | Stop reason: HOSPADM

## 2018-12-09 RX ORDER — DULOXETIN HYDROCHLORIDE 60 MG/1
60 CAPSULE, DELAYED RELEASE ORAL 2 TIMES DAILY
Status: DISCONTINUED | OUTPATIENT
Start: 2018-12-09 | End: 2018-12-11 | Stop reason: HOSPADM

## 2018-12-09 RX ORDER — METHYLPREDNISOLONE SODIUM SUCCINATE 125 MG/2ML
125 INJECTION, POWDER, LYOPHILIZED, FOR SOLUTION INTRAMUSCULAR; INTRAVENOUS ONCE
Status: COMPLETED | OUTPATIENT
Start: 2018-12-09 | End: 2018-12-09

## 2018-12-09 RX ORDER — TRAMADOL HYDROCHLORIDE 50 MG/1
50 TABLET ORAL EVERY 6 HOURS PRN
Status: DISCONTINUED | OUTPATIENT
Start: 2018-12-09 | End: 2018-12-09

## 2018-12-09 RX ORDER — LEVOTHYROXINE SODIUM 0.05 MG/1
50 TABLET ORAL
Status: DISCONTINUED | OUTPATIENT
Start: 2018-12-10 | End: 2018-12-11 | Stop reason: HOSPADM

## 2018-12-09 RX ORDER — IPRATROPIUM BROMIDE AND ALBUTEROL SULFATE 2.5; .5 MG/3ML; MG/3ML
3 SOLUTION RESPIRATORY (INHALATION)
Status: DISCONTINUED | OUTPATIENT
Start: 2018-12-09 | End: 2018-12-10

## 2018-12-09 RX ORDER — CALCIUM CARBONATE 500 MG/1
500 TABLET, CHEWABLE ORAL 3 TIMES DAILY
Status: DISCONTINUED | OUTPATIENT
Start: 2018-12-09 | End: 2018-12-11 | Stop reason: HOSPADM

## 2018-12-09 RX ORDER — AMOXICILLIN 250 MG
2 CAPSULE ORAL DAILY PRN
Status: DISCONTINUED | OUTPATIENT
Start: 2018-12-09 | End: 2018-12-11 | Stop reason: HOSPADM

## 2018-12-09 RX ORDER — MULTIVITAMIN,THER AND MINERALS
1 TABLET ORAL DAILY
Status: DISCONTINUED | OUTPATIENT
Start: 2018-12-09 | End: 2018-12-11 | Stop reason: HOSPADM

## 2018-12-09 RX ORDER — ALBUTEROL SULFATE 2.5 MG/3ML
15 SOLUTION RESPIRATORY (INHALATION) ONCE
Status: COMPLETED | OUTPATIENT
Start: 2018-12-09 | End: 2018-12-09

## 2018-12-09 RX ORDER — NITROGLYCERIN 0.4 MG/1
0.4 TABLET SUBLINGUAL EVERY 5 MIN PRN
Status: DISCONTINUED | OUTPATIENT
Start: 2018-12-09 | End: 2018-12-11 | Stop reason: HOSPADM

## 2018-12-09 RX ORDER — NICOTINE POLACRILEX 4 MG
15 LOZENGE BUCCAL PRN
Status: DISCONTINUED | OUTPATIENT
Start: 2018-12-09 | End: 2018-12-11 | Stop reason: HOSPADM

## 2018-12-09 RX ORDER — POLYETHYLENE GLYCOL 3350 17 G/17G
34 POWDER, FOR SOLUTION ORAL DAILY
Status: DISCONTINUED | OUTPATIENT
Start: 2018-12-10 | End: 2018-12-11 | Stop reason: HOSPADM

## 2018-12-09 RX ORDER — CODEINE PHOSPHATE AND GUAIFENESIN 10; 100 MG/5ML; MG/5ML
5 SOLUTION ORAL 3 TIMES DAILY PRN
Status: DISCONTINUED | OUTPATIENT
Start: 2018-12-09 | End: 2018-12-11 | Stop reason: HOSPADM

## 2018-12-09 RX ORDER — ACETAMINOPHEN 325 MG/1
650 TABLET ORAL EVERY 4 HOURS PRN
Status: DISCONTINUED | OUTPATIENT
Start: 2018-12-09 | End: 2018-12-11 | Stop reason: HOSPADM

## 2018-12-09 RX ORDER — PRAZOSIN HYDROCHLORIDE 1 MG/1
2 CAPSULE ORAL NIGHTLY
Status: DISCONTINUED | OUTPATIENT
Start: 2018-12-09 | End: 2018-12-11 | Stop reason: HOSPADM

## 2018-12-09 RX ORDER — HYDROXYZINE HYDROCHLORIDE 25 MG/1
75 TABLET, FILM COATED ORAL AT BEDTIME
Status: DISCONTINUED | OUTPATIENT
Start: 2018-12-09 | End: 2018-12-11 | Stop reason: HOSPADM

## 2018-12-09 RX ORDER — PANTOPRAZOLE SODIUM 40 MG/1
40 TABLET, DELAYED RELEASE ORAL
Status: DISCONTINUED | OUTPATIENT
Start: 2018-12-09 | End: 2018-12-11 | Stop reason: HOSPADM

## 2018-12-09 RX ORDER — CLONAZEPAM 1 MG/1
1 TABLET ORAL NIGHTLY
Status: DISCONTINUED | OUTPATIENT
Start: 2018-12-09 | End: 2018-12-11 | Stop reason: HOSPADM

## 2018-12-09 RX ORDER — GABAPENTIN 100 MG/1
200 CAPSULE ORAL 2 TIMES DAILY
Status: DISCONTINUED | OUTPATIENT
Start: 2018-12-09 | End: 2018-12-11 | Stop reason: HOSPADM

## 2018-12-09 RX ORDER — BUPROPION HYDROCHLORIDE 150 MG/1
300 TABLET ORAL DAILY
Status: DISCONTINUED | OUTPATIENT
Start: 2018-12-10 | End: 2018-12-11 | Stop reason: HOSPADM

## 2018-12-09 RX ADMIN — CYANOCOBALAMIN TAB 250 MCG 125 MCG: 250 TAB at 17:43

## 2018-12-09 RX ADMIN — PHENYTOIN 2 MG: 125 SUSPENSION ORAL at 20:53

## 2018-12-09 RX ADMIN — Medication 1 TABLET: at 15:52

## 2018-12-09 RX ADMIN — VITAMIN D, TAB 1000IU (100/BT) 1000 UNITS: 25 TAB at 17:42

## 2018-12-09 RX ADMIN — SODIUM CHLORIDE 50 ML: 9 INJECTION, SOLUTION INTRAVENOUS at 12:15

## 2018-12-09 RX ADMIN — ALBUTEROL SULFATE 15 MG: 2.5 SOLUTION RESPIRATORY (INHALATION) at 09:20

## 2018-12-09 RX ADMIN — INSULIN LISPRO 4 UNITS: 100 INJECTION, SOLUTION INTRAVENOUS; SUBCUTANEOUS at 17:40

## 2018-12-09 RX ADMIN — BUTALBITAL, ACETAMINOPHEN, AND CAFFEINE 1 TABLET: 50; 325; 40 TABLET ORAL at 16:47

## 2018-12-09 RX ADMIN — Medication 84 MG: at 15:51

## 2018-12-09 RX ADMIN — BUSPIRONE HYDROCHLORIDE 30 MG: 15 TABLET ORAL at 20:51

## 2018-12-09 RX ADMIN — HYDROXYZINE HYDROCHLORIDE 75 MG: 25 TABLET ORAL at 20:50

## 2018-12-09 RX ADMIN — IOPAMIDOL 60 ML: 755 INJECTION, SOLUTION INTRAVENOUS at 12:15

## 2018-12-09 RX ADMIN — GABAPENTIN 200 MG: 100 CAPSULE ORAL at 16:36

## 2018-12-09 RX ADMIN — ACETAMINOPHEN AND CODEINE PHOSPHATE 2 TABLET: 300; 30 TABLET ORAL at 20:50

## 2018-12-09 RX ADMIN — SODIUM CHLORIDE 500 ML: 9 INJECTION, SOLUTION INTRAVENOUS at 11:31

## 2018-12-09 RX ADMIN — CALCIUM CARBONATE (ANTACID) CHEW TAB 500 MG 500 MG: 500 CHEW TAB at 20:51

## 2018-12-09 RX ADMIN — METHYLPREDNISOLONE SODIUM SUCCINATE 125 MG: 125 INJECTION, POWDER, FOR SOLUTION INTRAMUSCULAR; INTRAVENOUS at 09:18

## 2018-12-09 RX ADMIN — CLONAZEPAM 1 MG: 1 TABLET ORAL at 20:52

## 2018-12-09 RX ADMIN — FERROUS SULFATE TAB 325 MG (65 MG ELEMENTAL FE) 325 MG: 325 (65 FE) TAB at 17:42

## 2018-12-09 RX ADMIN — INSULIN LISPRO 4 UNITS: 100 INJECTION, SOLUTION INTRAVENOUS; SUBCUTANEOUS at 13:52

## 2018-12-09 RX ADMIN — AZITHROMYCIN MONOHYDRATE 500 MG: 500 INJECTION, POWDER, LYOPHILIZED, FOR SOLUTION INTRAVENOUS at 14:45

## 2018-12-09 RX ADMIN — FAMOTIDINE 20 MG: 20 TABLET, FILM COATED ORAL at 16:45

## 2018-12-09 RX ADMIN — HEPARIN SODIUM 5000 UNITS: 5000 INJECTION INTRAVENOUS; SUBCUTANEOUS at 21:00

## 2018-12-09 RX ADMIN — POTASSIUM CHLORIDE 20 MEQ: 10 TABLET, FILM COATED, EXTENDED RELEASE ORAL at 15:51

## 2018-12-09 RX ADMIN — IPRATROPIUM BROMIDE AND ALBUTEROL SULFATE 3 ML: 2.5; .5 SOLUTION RESPIRATORY (INHALATION) at 19:27

## 2018-12-09 RX ADMIN — PANTOPRAZOLE SODIUM 40 MG: 40 TABLET, DELAYED RELEASE ORAL at 15:51

## 2018-12-09 RX ADMIN — GUAIFENESIN AND CODEINE PHOSPHATE 5 ML: 10; 100 LIQUID ORAL at 18:10

## 2018-12-09 RX ADMIN — CALCIUM CARBONATE (ANTACID) CHEW TAB 500 MG 500 MG: 500 CHEW TAB at 15:52

## 2018-12-09 RX ADMIN — MAGNESIUM SULFATE HEPTAHYDRATE 1 G: 1 INJECTION, SOLUTION INTRAVENOUS at 11:31

## 2018-12-09 RX ADMIN — GABAPENTIN 300 MG: 300 CAPSULE ORAL at 20:52

## 2018-12-09 RX ADMIN — ISOSORBIDE DINITRATE 10 MG: 10 TABLET ORAL at 15:51

## 2018-12-09 RX ADMIN — FLUTICASONE PROPIONATE AND SALMETEROL 1 PUFF: 50; 500 POWDER RESPIRATORY (INHALATION) at 19:28

## 2018-12-09 RX ADMIN — IPRATROPIUM BROMIDE AND ALBUTEROL SULFATE 3 ML: 2.5; .5 SOLUTION RESPIRATORY (INHALATION) at 15:58

## 2018-12-09 RX ADMIN — DULOXETINE 60 MG: 60 CAPSULE, DELAYED RELEASE ORAL at 17:42

## 2018-12-09 RX ADMIN — HEPARIN SODIUM 5000 UNITS: 5000 INJECTION INTRAVENOUS; SUBCUTANEOUS at 15:52

## 2018-12-09 RX ADMIN — ISOSORBIDE DINITRATE 10 MG: 10 TABLET ORAL at 20:52

## 2018-12-09 RX ADMIN — SODIUM CHLORIDE 10 ML: 9 INJECTION INTRAMUSCULAR; INTRAVENOUS; SUBCUTANEOUS at 12:12

## 2018-12-09 SDOH — HEALTH STABILITY: MENTAL HEALTH: HOW OFTEN DO YOU HAVE A DRINK CONTAINING ALCOHOL?: NEVER

## 2018-12-09 ASSESSMENT — ACTIVITIES OF DAILY LIVING (ADL)
ADL_BEFORE_ADMISSION: INDEPENDENT
CHRONIC_PAIN_PRESENT: YES, CHRONIC
DESCRIBE HOW PAIN IMPACTS YOUR LIFE: NONE/CAN MANAGE
ADL_SCORE: 12
ADL_SHORT_OF_BREATH: NO
RECENT_DECLINE_ADL: NO

## 2018-12-09 ASSESSMENT — ENCOUNTER SYMPTOMS
WEAKNESS: 0
SORE THROAT: 0
COUGH: 1
RHINORRHEA: 0
FEVER: 0
WOUND: 0
SHORTNESS OF BREATH: 1
NUMBNESS: 0
WHEEZING: 1
VOMITING: 0
ABDOMINAL PAIN: 0
CHEST TIGHTNESS: 1
NAUSEA: 0
CHILLS: 0

## 2018-12-09 ASSESSMENT — COGNITIVE AND FUNCTIONAL STATUS - GENERAL
ARE YOU BLIND OR DO YOU HAVE SERIOUS DIFFICULTY SEEING, EVEN WHEN WEARING GLASSES: YES
ARE YOU DEAF OR DO YOU HAVE SERIOUS DIFFICULTY  HEARING: NO

## 2018-12-09 ASSESSMENT — LIFESTYLE VARIABLES
HOW OFTEN DO YOU HAVE A DRINK CONTAINING ALCOHOL: NEVER
ALCOHOL_USE_STATUS: NO OR LOW RISK WITH VALIDATED TOOL
HOW OFTEN DO YOU HAVE 6 OR MORE DRINKS ON ONE OCCASION: NEVER
HOW MANY STANDARD DRINKS CONTAINING ALCOHOL DO YOU HAVE ON A TYPICAL DAY: 0,1 OR 2
AUDIT-C TOTAL SCORE: 0

## 2018-12-09 ASSESSMENT — PAIN SCALES - GENERAL
PAIN_LEVEL_AT_REST: 8
PAINLEVEL_OUTOF10: 7
PAIN_LEVEL_AT_REST: 5
PAIN_LEVEL_AT_REST: 3
PAIN_LEVEL_AT_REST: 3

## 2018-12-10 LAB
ALBUMIN SERPL-MCNC: 3.5 G/DL (ref 3.6–5.1)
ALBUMIN/GLOB SERPL: 1 {RATIO} (ref 1–2.4)
ALP SERPL-CCNC: 68 UNITS/L (ref 45–117)
ALT SERPL-CCNC: 30 UNITS/L
ANION GAP SERPL CALC-SCNC: 12 MMOL/L (ref 10–20)
AST SERPL-CCNC: 30 UNITS/L
ATRIAL RATE (BPM): 95
BASOPHILS # BLD AUTO: 0 K/MCL (ref 0–0.3)
BASOPHILS NFR BLD AUTO: 0 %
BILIRUB SERPL-MCNC: 0.5 MG/DL (ref 0.2–1)
BUN SERPL-MCNC: 22 MG/DL (ref 6–20)
BUN/CREAT SERPL: 15 (ref 7–25)
C PNEUM DNA SPEC QL NAA+PROBE: NOT DETECTED
CALCIUM SERPL-MCNC: 8.8 MG/DL (ref 8.4–10.2)
CHLORIDE SERPL-SCNC: 102 MMOL/L (ref 98–107)
CO2 SERPL-SCNC: 27 MMOL/L (ref 21–32)
CREAT SERPL-MCNC: 1.49 MG/DL (ref 0.51–0.95)
DIFFERENTIAL METHOD BLD: ABNORMAL
EOSINOPHIL # BLD AUTO: 0 K/MCL (ref 0.1–0.5)
EOSINOPHIL NFR SPEC: 0 %
ERYTHROCYTE [DISTWIDTH] IN BLOOD: 13.7 % (ref 11–15)
FLUAV H1 2009 PAND RNA NPH QL NAA+PROBE: NOT DETECTED
FLUAV H1 RNA NPH QL NAA+PROBE: NOT DETECTED
FLUAV H3 RNA NPH QL NAA+PROBE: NOT DETECTED
FLUAV RNA NPH QL NAA+PROBE: NORMAL
FLUBV RNA NPH QL NAA+PROBE: NOT DETECTED
GLOBULIN SER-MCNC: 3.5 G/DL (ref 2–4)
GLUCOSE BLDC GLUCOMTR-MCNC: 124 MG/DL (ref 65–99)
GLUCOSE BLDC GLUCOMTR-MCNC: 138 MG/DL (ref 65–99)
GLUCOSE BLDC GLUCOMTR-MCNC: 149 MG/DL (ref 65–99)
GLUCOSE BLDC GLUCOMTR-MCNC: 197 MG/DL (ref 65–99)
GLUCOSE BLDC GLUCOMTR-MCNC: 280 MG/DL (ref 65–99)
GLUCOSE SERPL-MCNC: 152 MG/DL (ref 65–99)
HADV DNA NPH QL NAA+PROBE: NOT DETECTED
HBOV DNA SPEC QL NAA+PROBE: NOT DETECTED
HCOV 229E RNA SPEC QL NAA+PROBE: NOT DETECTED
HCOV HKU1 RNA SPEC QL NAA+PROBE: NOT DETECTED
HCOV NL63 RNA SPEC QL NAA+PROBE: NOT DETECTED
HCOV OC43 RNA SPEC QL NAA+PROBE: NOT DETECTED
HCT VFR BLD CALC: 33.7 % (ref 36–46.5)
HGB BLD-MCNC: 11.2 G/DL (ref 12–15.5)
HMPV RNA NPH QL NAA+PROBE: NOT DETECTED
HPIV1 RNA NPH QL NAA+PROBE: NOT DETECTED
HPIV2 RNA NPH QL NAA+PROBE: NOT DETECTED
HPIV3 RNA NPH QL NAA+PROBE: NOT DETECTED
HPIV4 RNA NPH QL NAA+PROBE: NOT DETECTED
LYMPHOCYTES # BLD MANUAL: 1.1 K/MCL (ref 1–4)
LYMPHOCYTES NFR BLD MANUAL: 17 %
M PNEUMO DNA SPEC QL NAA+PROBE: NOT DETECTED
MAGNESIUM SERPL-MCNC: 2.4 MG/DL (ref 1.7–2.4)
MCH RBC QN AUTO: 30 PG (ref 26–34)
MCHC RBC AUTO-ENTMCNC: 33.2 G/DL (ref 32–36.5)
MCV RBC AUTO: 90.3 FL (ref 78–100)
MONOCYTES # BLD MANUAL: 0.6 K/MCL (ref 0.3–0.9)
MONOCYTES NFR BLD MANUAL: 10 %
NEUTROPHILS # BLD: 4.6 K/MCL (ref 1.8–7.7)
NEUTROPHILS NFR BLD AUTO: 73 %
NT-PROBNP SERPL-MCNC: 1356 PG/ML
P AXIS (DEGREES): 62
PLATELET # BLD: 229 K/MCL (ref 140–450)
POTASSIUM SERPL-SCNC: 4.5 MMOL/L (ref 3.4–5.1)
PR-INTERVAL (MSEC): 196
PROT SERPL-MCNC: 7 G/DL (ref 6.4–8.2)
QRS-INTERVAL (MSEC): 90
QT-INTERVAL (MSEC): 332
QTC: 417
R AXIS (DEGREES): 20
RBC # BLD: 3.73 MIL/MCL (ref 4–5.2)
REPORT STATUS (RPT): ABNORMAL
REPORT TEXT: NORMAL
RSV A RNA NPH QL NAA+PROBE: NOT DETECTED
RSV B RNA NPH QL NAA+PROBE: NOT DETECTED
RV+EV RNA SPEC QL NAA+PROBE: NOT DETECTED
S PYO SPEC QL CULT: ABNORMAL
SODIUM SERPL-SCNC: 136 MMOL/L (ref 135–145)
SPECIMEN SOURCE: ABNORMAL
SPECIMEN SOURCE: NORMAL
T AXIS (DEGREES): 42
TSH SERPL-ACNC: 0.48 MCUNITS/ML (ref 0.35–5)
VENTRICULAR RATE EKG/MIN (BPM): 95
WBC # BLD: 6.3 K/MCL (ref 4.2–11)

## 2018-12-10 PROCEDURE — 10002800 HB RX 250 W HCPCS: Performed by: INTERNAL MEDICINE

## 2018-12-10 PROCEDURE — 10004325 HB COUNTER ASSESSMENT EA 15 MIN: Performed by: DIETITIAN, REGISTERED

## 2018-12-10 PROCEDURE — 10002807 HB RX 258: Performed by: INTERNAL MEDICINE

## 2018-12-10 PROCEDURE — 83880 ASSAY OF NATRIURETIC PEPTIDE: CPT

## 2018-12-10 PROCEDURE — 10000002 HB ROOM CHARGE MED SURG

## 2018-12-10 PROCEDURE — 10002803 HB RX 637: Performed by: INTERNAL MEDICINE

## 2018-12-10 PROCEDURE — 85025 COMPLETE CBC W/AUTO DIFF WBC: CPT

## 2018-12-10 PROCEDURE — 10003445 HB TELEMETRY PER DAY

## 2018-12-10 PROCEDURE — 82962 GLUCOSE BLOOD TEST: CPT

## 2018-12-10 PROCEDURE — G0378 HOSPITAL OBSERVATION PER HR: HCPCS

## 2018-12-10 PROCEDURE — 84443 ASSAY THYROID STIM HORMONE: CPT

## 2018-12-10 PROCEDURE — 80053 COMPREHEN METABOLIC PANEL: CPT

## 2018-12-10 PROCEDURE — 10002801 HB RX 250 W/O HCPCS: Performed by: INTERNAL MEDICINE

## 2018-12-10 PROCEDURE — 94640 AIRWAY INHALATION TREATMENT: CPT

## 2018-12-10 PROCEDURE — 99232 SBSQ HOSP IP/OBS MODERATE 35: CPT | Performed by: INTERNAL MEDICINE

## 2018-12-10 PROCEDURE — 83735 ASSAY OF MAGNESIUM: CPT

## 2018-12-10 RX ORDER — INSULIN LISPRO 100 [IU]/ML
4 INJECTION, SOLUTION INTRAVENOUS; SUBCUTANEOUS ONCE
Status: COMPLETED | OUTPATIENT
Start: 2018-12-10 | End: 2018-12-10

## 2018-12-10 RX ORDER — METHYLPREDNISOLONE SODIUM SUCCINATE 125 MG/2ML
30 INJECTION, POWDER, LYOPHILIZED, FOR SOLUTION INTRAMUSCULAR; INTRAVENOUS EVERY 12 HOURS SCHEDULED
Status: DISCONTINUED | OUTPATIENT
Start: 2018-12-10 | End: 2018-12-11 | Stop reason: HOSPADM

## 2018-12-10 RX ORDER — IPRATROPIUM BROMIDE AND ALBUTEROL SULFATE 2.5; .5 MG/3ML; MG/3ML
3 SOLUTION RESPIRATORY (INHALATION)
Status: DISCONTINUED | OUTPATIENT
Start: 2018-12-10 | End: 2018-12-11 | Stop reason: HOSPADM

## 2018-12-10 RX ADMIN — GABAPENTIN 200 MG: 100 CAPSULE ORAL at 17:09

## 2018-12-10 RX ADMIN — Medication 84 MG: at 08:22

## 2018-12-10 RX ADMIN — GUAIFENESIN AND CODEINE PHOSPHATE 5 ML: 10; 100 LIQUID ORAL at 08:31

## 2018-12-10 RX ADMIN — FERROUS SULFATE TAB 325 MG (65 MG ELEMENTAL FE) 325 MG: 325 (65 FE) TAB at 17:09

## 2018-12-10 RX ADMIN — INSULIN LISPRO 4 UNITS: 100 INJECTION, SOLUTION INTRAVENOUS; SUBCUTANEOUS at 15:24

## 2018-12-10 RX ADMIN — CALCIUM CARBONATE (ANTACID) CHEW TAB 500 MG 500 MG: 500 CHEW TAB at 21:09

## 2018-12-10 RX ADMIN — HEPARIN SODIUM 5000 UNITS: 5000 INJECTION INTRAVENOUS; SUBCUTANEOUS at 13:38

## 2018-12-10 RX ADMIN — PHENYTOIN 2 MG: 125 SUSPENSION ORAL at 21:09

## 2018-12-10 RX ADMIN — HEPARIN SODIUM 5000 UNITS: 5000 INJECTION INTRAVENOUS; SUBCUTANEOUS at 21:12

## 2018-12-10 RX ADMIN — FERROUS SULFATE TAB 325 MG (65 MG ELEMENTAL FE) 325 MG: 325 (65 FE) TAB at 08:23

## 2018-12-10 RX ADMIN — BUSPIRONE HYDROCHLORIDE 30 MG: 15 TABLET ORAL at 08:22

## 2018-12-10 RX ADMIN — HEPARIN SODIUM 5000 UNITS: 5000 INJECTION INTRAVENOUS; SUBCUTANEOUS at 05:37

## 2018-12-10 RX ADMIN — DULOXETINE 60 MG: 60 CAPSULE, DELAYED RELEASE ORAL at 08:22

## 2018-12-10 RX ADMIN — CALCIUM CARBONATE (ANTACID) CHEW TAB 500 MG 500 MG: 500 CHEW TAB at 08:22

## 2018-12-10 RX ADMIN — POLYETHYLENE GLYCOL (3350) 34 G: 17 POWDER, FOR SOLUTION ORAL at 08:22

## 2018-12-10 RX ADMIN — METHYLPREDNISOLONE SODIUM SUCCINATE 30 MG: 125 INJECTION, POWDER, FOR SOLUTION INTRAMUSCULAR; INTRAVENOUS at 21:15

## 2018-12-10 RX ADMIN — PANTOPRAZOLE SODIUM 40 MG: 40 TABLET, DELAYED RELEASE ORAL at 05:37

## 2018-12-10 RX ADMIN — ISOSORBIDE DINITRATE 10 MG: 10 TABLET ORAL at 13:38

## 2018-12-10 RX ADMIN — ACETAMINOPHEN AND CODEINE PHOSPHATE 1 TABLET: 300; 30 TABLET ORAL at 05:55

## 2018-12-10 RX ADMIN — IPRATROPIUM BROMIDE AND ALBUTEROL SULFATE 3 ML: 2.5; .5 SOLUTION RESPIRATORY (INHALATION) at 15:05

## 2018-12-10 RX ADMIN — VITAMIN D, TAB 1000IU (100/BT) 1000 UNITS: 25 TAB at 17:09

## 2018-12-10 RX ADMIN — CALCIUM CARBONATE (ANTACID) CHEW TAB 500 MG 500 MG: 500 CHEW TAB at 13:38

## 2018-12-10 RX ADMIN — FLUTICASONE PROPIONATE AND SALMETEROL 1 PUFF: 50; 500 POWDER RESPIRATORY (INHALATION) at 07:34

## 2018-12-10 RX ADMIN — FUROSEMIDE 40 MG: 40 TABLET ORAL at 17:10

## 2018-12-10 RX ADMIN — BUPROPION HYDROCHLORIDE 300 MG: 150 TABLET, FILM COATED, EXTENDED RELEASE ORAL at 08:23

## 2018-12-10 RX ADMIN — LORATADINE 10 MG: 10 TABLET ORAL at 08:23

## 2018-12-10 RX ADMIN — HYDROXYZINE HYDROCHLORIDE 75 MG: 25 TABLET ORAL at 21:10

## 2018-12-10 RX ADMIN — ISOSORBIDE DINITRATE 10 MG: 10 TABLET ORAL at 08:23

## 2018-12-10 RX ADMIN — IPRATROPIUM BROMIDE AND ALBUTEROL SULFATE 3 ML: 2.5; .5 SOLUTION RESPIRATORY (INHALATION) at 19:52

## 2018-12-10 RX ADMIN — IPRATROPIUM BROMIDE AND ALBUTEROL SULFATE 3 ML: 2.5; .5 SOLUTION RESPIRATORY (INHALATION) at 11:01

## 2018-12-10 RX ADMIN — IPRATROPIUM BROMIDE AND ALBUTEROL SULFATE 3 ML: 2.5; .5 SOLUTION RESPIRATORY (INHALATION) at 07:27

## 2018-12-10 RX ADMIN — ACETAMINOPHEN AND CODEINE PHOSPHATE 2 TABLET: 300; 30 TABLET ORAL at 20:37

## 2018-12-10 RX ADMIN — FLUTICASONE PROPIONATE 2 SPRAY: 50 SPRAY, METERED NASAL at 08:31

## 2018-12-10 RX ADMIN — BUSPIRONE HYDROCHLORIDE 30 MG: 15 TABLET ORAL at 17:09

## 2018-12-10 RX ADMIN — CLONAZEPAM 0.5 MG: 1 TABLET ORAL at 21:57

## 2018-12-10 RX ADMIN — GABAPENTIN 200 MG: 100 CAPSULE ORAL at 08:22

## 2018-12-10 RX ADMIN — FUROSEMIDE 40 MG: 40 TABLET ORAL at 08:23

## 2018-12-10 RX ADMIN — METHYLPREDNISOLONE SODIUM SUCCINATE 60 MG: 125 INJECTION, POWDER, FOR SOLUTION INTRAMUSCULAR; INTRAVENOUS at 08:21

## 2018-12-10 RX ADMIN — AZITHROMYCIN MONOHYDRATE 500 MG: 500 INJECTION, POWDER, LYOPHILIZED, FOR SOLUTION INTRAVENOUS at 08:21

## 2018-12-10 RX ADMIN — FLUTICASONE PROPIONATE AND SALMETEROL 1 PUFF: 50; 500 POWDER RESPIRATORY (INHALATION) at 19:53

## 2018-12-10 RX ADMIN — DULOXETINE 60 MG: 60 CAPSULE, DELAYED RELEASE ORAL at 17:09

## 2018-12-10 RX ADMIN — POTASSIUM CHLORIDE 20 MEQ: 10 TABLET, FILM COATED, EXTENDED RELEASE ORAL at 08:22

## 2018-12-10 RX ADMIN — ISOSORBIDE DINITRATE 10 MG: 10 TABLET ORAL at 21:57

## 2018-12-10 RX ADMIN — Medication 1 TABLET: at 08:22

## 2018-12-10 RX ADMIN — ACETAMINOPHEN AND CODEINE PHOSPHATE 2 TABLET: 300; 30 TABLET ORAL at 13:38

## 2018-12-10 RX ADMIN — PANTOPRAZOLE SODIUM 40 MG: 40 TABLET, DELAYED RELEASE ORAL at 17:09

## 2018-12-10 RX ADMIN — INSULIN LISPRO 2 UNITS: 100 INJECTION, SOLUTION INTRAVENOUS; SUBCUTANEOUS at 17:43

## 2018-12-10 RX ADMIN — LEVOTHYROXINE SODIUM 50 MCG: 50 TABLET ORAL at 05:37

## 2018-12-10 RX ADMIN — FAMOTIDINE 20 MG: 20 TABLET, FILM COATED ORAL at 08:22

## 2018-12-10 RX ADMIN — GABAPENTIN 300 MG: 300 CAPSULE ORAL at 21:11

## 2018-12-10 ASSESSMENT — PAIN SCALES - GENERAL
PAIN_LEVEL_AT_REST: 3
PAIN_LEVEL_AT_REST: 8
PAIN_LEVEL_AT_REST: 2
PAIN_LEVEL_AT_REST: 3
PAIN_LEVEL_AT_REST: 3
PAIN_LEVEL_AT_REST: 4
PAIN_LEVEL_AT_REST: 2
PAIN_LEVEL_AT_REST: 3
PAIN_LEVEL_AT_REST: 8

## 2018-12-11 ENCOUNTER — E-ADVICE (OUTPATIENT)
Dept: PULMONOLOGY | Age: 63
End: 2018-12-11

## 2018-12-11 VITALS
OXYGEN SATURATION: 94 % | RESPIRATION RATE: 16 BRPM | DIASTOLIC BLOOD PRESSURE: 92 MMHG | HEART RATE: 109 BPM | TEMPERATURE: 98.4 F | BODY MASS INDEX: 43.47 KG/M2 | HEIGHT: 65 IN | WEIGHT: 260.9 LBS | SYSTOLIC BLOOD PRESSURE: 146 MMHG

## 2018-12-11 PROBLEM — N28.9 ACUTE RENAL INSUFFICIENCY: Status: ACTIVE | Noted: 2018-12-11

## 2018-12-11 PROBLEM — R06.02 SHORTNESS OF BREATH: Status: ACTIVE | Noted: 2018-12-11

## 2018-12-11 LAB
ANION GAP SERPL CALC-SCNC: 13 MMOL/L (ref 10–20)
BASOPHILS # BLD AUTO: 0 K/MCL (ref 0–0.3)
BASOPHILS NFR BLD AUTO: 0 %
BUN SERPL-MCNC: 16 MG/DL (ref 6–20)
BUN/CREAT SERPL: 12 (ref 7–25)
CALCIUM SERPL-MCNC: 9 MG/DL (ref 8.4–10.2)
CHLORIDE SERPL-SCNC: 100 MMOL/L (ref 98–107)
CO2 SERPL-SCNC: 30 MMOL/L (ref 21–32)
CREAT SERPL-MCNC: 1.29 MG/DL (ref 0.51–0.95)
DIFFERENTIAL METHOD BLD: ABNORMAL
EOSINOPHIL # BLD AUTO: 0 K/MCL (ref 0.1–0.5)
EOSINOPHIL NFR SPEC: 0 %
ERYTHROCYTE [DISTWIDTH] IN BLOOD: 13.6 % (ref 11–15)
FERRITIN SERPL-MCNC: 112 NG/ML (ref 8–252)
FOLATE SERPL-MCNC: 22.1 NG/ML
GLUCOSE BLDC GLUCOMTR-MCNC: 109 MG/DL (ref 65–99)
GLUCOSE SERPL-MCNC: 115 MG/DL (ref 65–99)
HCT VFR BLD CALC: 34.8 % (ref 36–46.5)
HGB BLD-MCNC: 11.7 G/DL (ref 12–15.5)
IRON SATN MFR SERPL: 40 % (ref 15–45)
IRON SERPL-MCNC: 101 MCG/DL (ref 50–170)
LYMPHOCYTES # BLD MANUAL: 1.8 K/MCL (ref 1–4)
LYMPHOCYTES NFR BLD MANUAL: 32 %
MCH RBC QN AUTO: 30.5 PG (ref 26–34)
MCHC RBC AUTO-ENTMCNC: 33.6 G/DL (ref 32–36.5)
MCV RBC AUTO: 90.6 FL (ref 78–100)
MONOCYTES # BLD MANUAL: 0.6 K/MCL (ref 0.3–0.9)
MONOCYTES NFR BLD MANUAL: 10 %
NEUTROPHILS # BLD: 3.2 K/MCL (ref 1.8–7.7)
NEUTROPHILS NFR BLD AUTO: 58 %
PLATELET # BLD: 230 K/MCL (ref 140–450)
POTASSIUM SERPL-SCNC: 4.2 MMOL/L (ref 3.4–5.1)
RBC # BLD: 3.84 MIL/MCL (ref 4–5.2)
SODIUM SERPL-SCNC: 139 MMOL/L (ref 135–145)
TIBC SERPL-MCNC: 250 MCG/DL (ref 250–450)
VIT B12 SERPL-MCNC: 995 PG/ML (ref 211–911)
WBC # BLD: 5.6 K/MCL (ref 4.2–11)

## 2018-12-11 PROCEDURE — G0378 HOSPITAL OBSERVATION PER HR: HCPCS

## 2018-12-11 PROCEDURE — 94640 AIRWAY INHALATION TREATMENT: CPT

## 2018-12-11 PROCEDURE — 10002800 HB RX 250 W HCPCS: Performed by: INTERNAL MEDICINE

## 2018-12-11 PROCEDURE — 10002801 HB RX 250 W/O HCPCS: Performed by: INTERNAL MEDICINE

## 2018-12-11 PROCEDURE — 85025 COMPLETE CBC W/AUTO DIFF WBC: CPT

## 2018-12-11 PROCEDURE — 36415 COLL VENOUS BLD VENIPUNCTURE: CPT

## 2018-12-11 PROCEDURE — 87077 CULTURE AEROBIC IDENTIFY: CPT

## 2018-12-11 PROCEDURE — 82962 GLUCOSE BLOOD TEST: CPT

## 2018-12-11 PROCEDURE — 99239 HOSP IP/OBS DSCHRG MGMT >30: CPT | Performed by: INTERNAL MEDICINE

## 2018-12-11 PROCEDURE — 10002807 HB RX 258: Performed by: INTERNAL MEDICINE

## 2018-12-11 PROCEDURE — 80048 BASIC METABOLIC PNL TOTAL CA: CPT

## 2018-12-11 PROCEDURE — 82728 ASSAY OF FERRITIN: CPT

## 2018-12-11 PROCEDURE — 82607 VITAMIN B-12: CPT

## 2018-12-11 PROCEDURE — 10002803 HB RX 637: Performed by: INTERNAL MEDICINE

## 2018-12-11 PROCEDURE — 83550 IRON BINDING TEST: CPT

## 2018-12-11 PROCEDURE — 87205 SMEAR GRAM STAIN: CPT

## 2018-12-11 RX ORDER — AZITHROMYCIN 250 MG/1
250 TABLET, FILM COATED ORAL DAILY
Qty: 2 TABLET | Refills: 0 | Status: SHIPPED | OUTPATIENT
Start: 2018-12-12 | End: 2018-12-14 | Stop reason: SDUPTHER

## 2018-12-11 RX ORDER — PREDNISONE 10 MG/1
TABLET ORAL
Qty: 30 TABLET | Refills: 0 | Status: SHIPPED | OUTPATIENT
Start: 2018-12-11 | End: 2019-02-25 | Stop reason: DRUGHIGH

## 2018-12-11 RX ORDER — IPRATROPIUM BROMIDE AND ALBUTEROL SULFATE 2.5; .5 MG/3ML; MG/3ML
3 SOLUTION RESPIRATORY (INHALATION) EVERY 4 HOURS PRN
Qty: 900 ML | Refills: 0 | Status: SHIPPED | COMMUNITY
Start: 2018-12-11 | End: 2019-06-24 | Stop reason: SDUPTHER

## 2018-12-11 RX ORDER — PROBIOTIC PRODUCT - TAB 1B-250 MG
1 TAB ORAL DAILY
Qty: 14 TABLET | Refills: 0 | Status: SHIPPED | OUTPATIENT
Start: 2018-12-11 | End: 2019-01-16 | Stop reason: ALTCHOICE

## 2018-12-11 RX ADMIN — GABAPENTIN 200 MG: 100 CAPSULE ORAL at 08:14

## 2018-12-11 RX ADMIN — POTASSIUM CHLORIDE 20 MEQ: 10 TABLET, FILM COATED, EXTENDED RELEASE ORAL at 08:14

## 2018-12-11 RX ADMIN — LEVOTHYROXINE SODIUM 50 MCG: 50 TABLET ORAL at 05:35

## 2018-12-11 RX ADMIN — Medication 1 TABLET: at 08:14

## 2018-12-11 RX ADMIN — HEPARIN SODIUM (PORCINE) LOCK FLUSH IV SOLN 100 UNIT/ML 500 UNITS: 100 SOLUTION at 09:41

## 2018-12-11 RX ADMIN — Medication 84 MG: at 08:14

## 2018-12-11 RX ADMIN — PANTOPRAZOLE SODIUM 40 MG: 40 TABLET, DELAYED RELEASE ORAL at 05:35

## 2018-12-11 RX ADMIN — LORATADINE 10 MG: 10 TABLET ORAL at 08:14

## 2018-12-11 RX ADMIN — IPRATROPIUM BROMIDE AND ALBUTEROL SULFATE 3 ML: 2.5; .5 SOLUTION RESPIRATORY (INHALATION) at 08:24

## 2018-12-11 RX ADMIN — FAMOTIDINE 20 MG: 20 TABLET, FILM COATED ORAL at 08:14

## 2018-12-11 RX ADMIN — GUAIFENESIN AND CODEINE PHOSPHATE 5 ML: 10; 100 LIQUID ORAL at 01:51

## 2018-12-11 RX ADMIN — FERROUS SULFATE TAB 325 MG (65 MG ELEMENTAL FE) 325 MG: 325 (65 FE) TAB at 08:14

## 2018-12-11 RX ADMIN — DULOXETINE 60 MG: 60 CAPSULE, DELAYED RELEASE ORAL at 08:14

## 2018-12-11 RX ADMIN — FLUTICASONE PROPIONATE AND SALMETEROL 1 PUFF: 50; 500 POWDER RESPIRATORY (INHALATION) at 08:25

## 2018-12-11 RX ADMIN — BUSPIRONE HYDROCHLORIDE 30 MG: 15 TABLET ORAL at 08:14

## 2018-12-11 RX ADMIN — FLUTICASONE PROPIONATE 2 SPRAY: 50 SPRAY, METERED NASAL at 08:16

## 2018-12-11 RX ADMIN — AZITHROMYCIN MONOHYDRATE 500 MG: 500 INJECTION, POWDER, LYOPHILIZED, FOR SOLUTION INTRAVENOUS at 08:15

## 2018-12-11 RX ADMIN — METHYLPREDNISOLONE SODIUM SUCCINATE 30 MG: 125 INJECTION, POWDER, FOR SOLUTION INTRAMUSCULAR; INTRAVENOUS at 08:15

## 2018-12-11 RX ADMIN — HEPARIN SODIUM 5000 UNITS: 5000 INJECTION INTRAVENOUS; SUBCUTANEOUS at 05:36

## 2018-12-11 RX ADMIN — ISOSORBIDE DINITRATE 10 MG: 10 TABLET ORAL at 08:14

## 2018-12-11 RX ADMIN — FUROSEMIDE 40 MG: 40 TABLET ORAL at 08:14

## 2018-12-11 RX ADMIN — CALCIUM CARBONATE (ANTACID) CHEW TAB 500 MG 500 MG: 500 CHEW TAB at 08:14

## 2018-12-11 RX ADMIN — BUPROPION HYDROCHLORIDE 300 MG: 150 TABLET, FILM COATED, EXTENDED RELEASE ORAL at 08:14

## 2018-12-11 ASSESSMENT — PAIN SCALES - GENERAL: PAIN_LEVEL_AT_REST: 3

## 2018-12-12 ENCOUNTER — OFFICE VISIT (OUTPATIENT)
Dept: INTERNAL MEDICINE | Age: 63
End: 2018-12-12

## 2018-12-12 VITALS
OXYGEN SATURATION: 94 % | SYSTOLIC BLOOD PRESSURE: 148 MMHG | RESPIRATION RATE: 16 BRPM | DIASTOLIC BLOOD PRESSURE: 72 MMHG | WEIGHT: 252 LBS | HEART RATE: 114 BPM | BODY MASS INDEX: 41.93 KG/M2

## 2018-12-12 DIAGNOSIS — J45.41 MODERATE PERSISTENT ASTHMA WITH ACUTE EXACERBATION: Primary | ICD-10-CM

## 2018-12-12 DIAGNOSIS — I10 ESSENTIAL HYPERTENSION WITH GOAL BLOOD PRESSURE LESS THAN 140/90: ICD-10-CM

## 2018-12-12 DIAGNOSIS — J45.909 INTRINSIC ASTHMA: ICD-10-CM

## 2018-12-12 PROBLEM — N28.9 ACUTE RENAL INSUFFICIENCY: Status: RESOLVED | Noted: 2018-12-11 | Resolved: 2018-12-12

## 2018-12-12 PROCEDURE — 99214 OFFICE O/P EST MOD 30 MIN: CPT | Performed by: INTERNAL MEDICINE

## 2018-12-12 RX ORDER — AZITHROMYCIN 250 MG/1
500 TABLET, FILM COATED ORAL DAILY
Qty: 14 TABLET | Refills: 0 | Status: SHIPPED | OUTPATIENT
Start: 2018-12-12 | End: 2019-01-24

## 2018-12-13 PROBLEM — R06.02 SHORTNESS OF BREATH: Status: RESOLVED | Noted: 2018-12-09 | Resolved: 2018-12-13

## 2018-12-13 LAB
BACTERIA SPEC RESP CULT: ABNORMAL
GRAM STN SPEC: ABNORMAL
GRAM STN SPEC: ABNORMAL
REPORT STATUS (RPT): ABNORMAL
SPECIMEN SOURCE: ABNORMAL

## 2018-12-14 ENCOUNTER — OFFICE VISIT (OUTPATIENT)
Dept: CARDIOLOGY | Age: 63
End: 2018-12-14

## 2018-12-14 VITALS
WEIGHT: 256.84 LBS | RESPIRATION RATE: 18 BRPM | HEART RATE: 102 BPM | SYSTOLIC BLOOD PRESSURE: 110 MMHG | DIASTOLIC BLOOD PRESSURE: 76 MMHG | BODY MASS INDEX: 42.74 KG/M2

## 2018-12-14 DIAGNOSIS — I06.2 AORTIC VALVE STENOSIS AND INSUFFICIENCY, RHEUMATIC: ICD-10-CM

## 2018-12-14 DIAGNOSIS — I34.0 MITRAL VALVE INSUFFICIENCY, UNSPECIFIED ETIOLOGY: ICD-10-CM

## 2018-12-14 DIAGNOSIS — I47.11 INAPPROPRIATE SINUS TACHYCARDIA: ICD-10-CM

## 2018-12-14 DIAGNOSIS — I20.1 PRINZMETAL ANGINA (CMD): ICD-10-CM

## 2018-12-14 DIAGNOSIS — I10 ESSENTIAL HYPERTENSION WITH GOAL BLOOD PRESSURE LESS THAN 140/90: ICD-10-CM

## 2018-12-14 DIAGNOSIS — I25.10 CORONARY ARTERY DISEASE INVOLVING NATIVE CORONARY ARTERY OF NATIVE HEART WITHOUT ANGINA PECTORIS: Primary | ICD-10-CM

## 2018-12-14 PROCEDURE — 99213 OFFICE O/P EST LOW 20 MIN: CPT | Performed by: INTERNAL MEDICINE

## 2018-12-18 ENCOUNTER — TELEPHONE (OUTPATIENT)
Dept: ALLERGY | Age: 63
End: 2018-12-18

## 2018-12-18 RX ORDER — TRAMADOL HYDROCHLORIDE 50 MG/1
50 TABLET ORAL EVERY 6 HOURS PRN
Qty: 50 TABLET | Refills: 3 | Status: SHIPPED | OUTPATIENT
Start: 2018-12-18

## 2018-12-26 ENCOUNTER — TELEPHONE (OUTPATIENT)
Dept: NEUROLOGY | Age: 63
End: 2018-12-26

## 2018-12-26 ENCOUNTER — OFFICE VISIT (OUTPATIENT)
Dept: NEUROLOGY | Age: 63
End: 2018-12-26

## 2018-12-26 VITALS
DIASTOLIC BLOOD PRESSURE: 82 MMHG | HEART RATE: 94 BPM | BODY MASS INDEX: 42.77 KG/M2 | WEIGHT: 257 LBS | SYSTOLIC BLOOD PRESSURE: 133 MMHG

## 2018-12-26 DIAGNOSIS — R47.89 OTHER SPEECH DISTURBANCE: ICD-10-CM

## 2018-12-26 DIAGNOSIS — G25.3 MYOCLONUS: Primary | ICD-10-CM

## 2018-12-26 DIAGNOSIS — G31.84 MILD COGNITIVE IMPAIRMENT WITH MEMORY LOSS: ICD-10-CM

## 2018-12-26 DIAGNOSIS — R25.1 TREMOR: ICD-10-CM

## 2018-12-26 PROCEDURE — 99214 OFFICE O/P EST MOD 30 MIN: CPT | Performed by: PSYCHIATRY & NEUROLOGY

## 2018-12-26 RX ORDER — TIZANIDINE 2 MG/1
TABLET ORAL
Qty: 120 TABLET | Refills: 6 | Status: SHIPPED | OUTPATIENT
Start: 2018-12-26 | End: 2019-06-13 | Stop reason: SDUPTHER

## 2018-12-26 RX ORDER — GABAPENTIN 100 MG/1
100 CAPSULE ORAL 2 TIMES DAILY
Qty: 360 CAPSULE | Refills: 3 | Status: SHIPPED | COMMUNITY
Start: 2018-12-26 | End: 2018-12-26 | Stop reason: SDUPTHER

## 2018-12-26 RX ORDER — GABAPENTIN 100 MG/1
100 CAPSULE ORAL 2 TIMES DAILY
Qty: 360 CAPSULE | Refills: 3 | Status: SHIPPED | OUTPATIENT
Start: 2018-12-26 | End: 2019-02-25 | Stop reason: DRUGHIGH

## 2018-12-26 RX ORDER — FLUTICASONE PROPIONATE AND SALMETEROL 50; 500 UG/1; UG/1
POWDER RESPIRATORY (INHALATION)
Qty: 3 EACH | Refills: 0 | Status: SHIPPED | OUTPATIENT
Start: 2018-12-26 | End: 2019-04-19 | Stop reason: SDUPTHER

## 2018-12-28 ENCOUNTER — E-ADVICE (OUTPATIENT)
Dept: ALLERGY | Age: 63
End: 2018-12-28

## 2018-12-29 ENCOUNTER — LAB SERVICES (OUTPATIENT)
Dept: LAB | Age: 63
End: 2018-12-29

## 2018-12-29 DIAGNOSIS — I25.10 CORONARY ARTERY DISEASE INVOLVING NATIVE CORONARY ARTERY OF NATIVE HEART WITHOUT ANGINA PECTORIS: ICD-10-CM

## 2018-12-29 LAB
ALBUMIN SERPL-MCNC: 3.7 G/DL (ref 3.6–5.1)
ALP SERPL-CCNC: 79 UNITS/L (ref 45–117)
ALT SERPL-CCNC: 29 UNITS/L
AST SERPL-CCNC: 28 UNITS/L
BILIRUB CONJ SERPL-MCNC: 0.1 MG/DL (ref 0–0.2)
BILIRUB SERPL-MCNC: 0.3 MG/DL (ref 0.2–1)
CHOLEST SERPL-MCNC: 223 MG/DL
CHOLEST/HDLC SERPL: 2.6 {RATIO}
HDLC SERPL-MCNC: 86 MG/DL
LDLC SERPL-MCNC: 110 MG/DL
LENGTH OF FAST TIME PATIENT: 12 HRS
NONHDLC SERPL-MCNC: 137 MG/DL
PROT SERPL-MCNC: 7.4 G/DL (ref 6.4–8.2)
TRIGL SERPL-MCNC: 133 MG/DL

## 2018-12-29 PROCEDURE — 36415 COLL VENOUS BLD VENIPUNCTURE: CPT | Performed by: INTERNAL MEDICINE

## 2018-12-29 PROCEDURE — 80061 LIPID PANEL: CPT | Performed by: INTERNAL MEDICINE

## 2018-12-29 PROCEDURE — 80076 HEPATIC FUNCTION PANEL: CPT | Performed by: INTERNAL MEDICINE

## 2018-12-31 ENCOUNTER — TELEPHONE (OUTPATIENT)
Dept: ALLERGY | Age: 63
End: 2018-12-31

## 2019-01-01 ENCOUNTER — EXTERNAL RECORD (OUTPATIENT)
Dept: OTHER | Age: 64
End: 2019-01-01

## 2019-01-02 ENCOUNTER — TELEPHONE (OUTPATIENT)
Dept: CARDIOLOGY | Age: 64
End: 2019-01-02

## 2019-01-02 ENCOUNTER — OFFICE VISIT (OUTPATIENT)
Dept: NEPHROLOGY | Age: 64
End: 2019-01-02

## 2019-01-02 VITALS
HEART RATE: 78 BPM | SYSTOLIC BLOOD PRESSURE: 151 MMHG | BODY MASS INDEX: 42.93 KG/M2 | WEIGHT: 258 LBS | DIASTOLIC BLOOD PRESSURE: 86 MMHG

## 2019-01-02 DIAGNOSIS — E78.00 PURE HYPERCHOLESTEROLEMIA: Primary | ICD-10-CM

## 2019-01-02 DIAGNOSIS — D63.1 ANEMIA OF CHRONIC RENAL FAILURE, STAGE 3 (MODERATE) (CMD): ICD-10-CM

## 2019-01-02 DIAGNOSIS — I10 ESSENTIAL HYPERTENSION WITH GOAL BLOOD PRESSURE LESS THAN 140/90: ICD-10-CM

## 2019-01-02 DIAGNOSIS — N18.30 CKD (CHRONIC KIDNEY DISEASE), STAGE III (CMD): Primary | ICD-10-CM

## 2019-01-02 DIAGNOSIS — N20.0 CALCULUS OF KIDNEY: ICD-10-CM

## 2019-01-02 DIAGNOSIS — N18.30 ANEMIA OF CHRONIC RENAL FAILURE, STAGE 3 (MODERATE) (CMD): ICD-10-CM

## 2019-01-02 PROCEDURE — 99214 OFFICE O/P EST MOD 30 MIN: CPT | Performed by: INTERNAL MEDICINE

## 2019-01-02 RX ORDER — LOSARTAN POTASSIUM 25 MG/1
25 TABLET ORAL DAILY
Qty: 90 TABLET | Refills: 2 | Status: SHIPPED | OUTPATIENT
Start: 2019-01-02 | End: 2019-07-21 | Stop reason: SDUPTHER

## 2019-01-03 ENCOUNTER — HOSPITAL ENCOUNTER (OUTPATIENT)
Dept: CARDIOLOGY | Age: 64
Discharge: HOME | End: 2019-01-03
Attending: PSYCHIATRY & NEUROLOGY

## 2019-01-03 DIAGNOSIS — G25.3 MYOCLONUS: ICD-10-CM

## 2019-01-03 PROCEDURE — 95819 EEG AWAKE AND ASLEEP: CPT

## 2019-01-03 PROCEDURE — 95819 EEG AWAKE AND ASLEEP: CPT | Performed by: PSYCHIATRY & NEUROLOGY

## 2019-01-04 ENCOUNTER — TELEPHONE (OUTPATIENT)
Dept: NEUROLOGY | Age: 64
End: 2019-01-04

## 2019-01-07 ENCOUNTER — OFF PREMISE CHARGES (OUTPATIENT)
Dept: NEUROLOGY | Age: 64
End: 2019-01-07

## 2019-01-07 ENCOUNTER — TELEPHONE (OUTPATIENT)
Dept: NEUROLOGY | Age: 64
End: 2019-01-07

## 2019-01-07 DIAGNOSIS — G25.3 MYOCLONUS: ICD-10-CM

## 2019-01-08 ENCOUNTER — OFFICE VISIT (OUTPATIENT)
Dept: OTOLARYNGOLOGY | Age: 64
End: 2019-01-08

## 2019-01-08 VITALS
BODY MASS INDEX: 42.32 KG/M2 | SYSTOLIC BLOOD PRESSURE: 138 MMHG | TEMPERATURE: 98 F | DIASTOLIC BLOOD PRESSURE: 86 MMHG | HEIGHT: 65 IN | WEIGHT: 254 LBS | HEART RATE: 68 BPM

## 2019-01-08 DIAGNOSIS — R09.89 CHRONIC THROAT CLEARING: ICD-10-CM

## 2019-01-08 DIAGNOSIS — R09.82 PND (POST-NASAL DRIP): Primary | ICD-10-CM

## 2019-01-08 PROCEDURE — 99243 OFF/OP CNSLTJ NEW/EST LOW 30: CPT | Performed by: OTOLARYNGOLOGY

## 2019-01-09 ENCOUNTER — OFFICE VISIT (OUTPATIENT)
Dept: ORTHOPEDICS | Age: 64
End: 2019-01-09

## 2019-01-09 DIAGNOSIS — M70.50 PES ANSERINE BURSITIS: ICD-10-CM

## 2019-01-09 DIAGNOSIS — M25.562 CHRONIC PAIN OF LEFT KNEE: ICD-10-CM

## 2019-01-09 DIAGNOSIS — G89.29 CHRONIC PAIN OF LEFT KNEE: ICD-10-CM

## 2019-01-09 DIAGNOSIS — Z96.652 HISTORY OF TOTAL LEFT KNEE REPLACEMENT: Primary | ICD-10-CM

## 2019-01-09 PROCEDURE — 99213 OFFICE O/P EST LOW 20 MIN: CPT | Performed by: PHYSICIAN ASSISTANT

## 2019-01-16 ENCOUNTER — OFFICE VISIT (OUTPATIENT)
Dept: OPHTHALMOLOGY | Age: 64
End: 2019-01-16

## 2019-01-16 DIAGNOSIS — H49.12: Primary | ICD-10-CM

## 2019-01-16 PROCEDURE — 92060 SENSORIMOTOR EXAMINATION: CPT | Performed by: OPHTHALMOLOGY

## 2019-01-16 PROCEDURE — 99242 OFF/OP CONSLTJ NEW/EST SF 20: CPT | Performed by: OPHTHALMOLOGY

## 2019-01-16 RX ORDER — POTASSIUM CHLORIDE 1.5 G/1.58G
20 POWDER, FOR SOLUTION ORAL DAILY
COMMUNITY
End: 2019-02-25 | Stop reason: DRUGHIGH

## 2019-01-16 RX ORDER — BUSPIRONE HYDROCHLORIDE 10 MG/1
20 TABLET ORAL 2 TIMES DAILY
COMMUNITY
End: 2019-03-12 | Stop reason: DRUGHIGH

## 2019-01-16 ASSESSMENT — ENCOUNTER SYMPTOMS
HEMATOLOGIC/LYMPHATIC NEGATIVE: 0
ALLERGIC/IMMUNOLOGIC NEGATIVE: 0
PSYCHIATRIC NEGATIVE: 0
ENDOCRINE NEGATIVE: 1
EYES NEGATIVE: 1
CONSTITUTIONAL NEGATIVE: 0
NEUROLOGICAL NEGATIVE: 1
RESPIRATORY NEGATIVE: 1
GASTROINTESTINAL NEGATIVE: 0

## 2019-01-16 ASSESSMENT — VISUAL ACUITY
OD_CC: 20/40
OD_CC: JAEGER 1 -1
OS_CC: 20/40
OS_PH_CC: 20/25
METHOD: SNELLEN - LINEAR
OD_CC+: -2
OS_CC: JAEGER 2 -1
OD_PH_CC: 20/25
OD_PH_CC+: -1
CORRECTION_TYPE: GLASSES

## 2019-01-16 ASSESSMENT — SLIT LAMP EXAM - LIDS
COMMENTS: NORMAL
COMMENTS: NORMAL

## 2019-01-16 ASSESSMENT — REFRACTION_WEARINGRX
OD_AXIS: 180
OD_CYLINDER: +0.50
OD_SPHERE: +0.75
OS_CYLINDER: +0.50
OS_SPHERE: +0.25
OS_AXIS: 155
OS_ADD: +2.50
OD_ADD: +2.50

## 2019-01-16 ASSESSMENT — CUP TO DISC RATIO
OS_RATIO: 0.3
OD_RATIO: 0.3

## 2019-01-16 ASSESSMENT — EXTERNAL EXAM - RIGHT EYE: OD_EXAM: NORMAL

## 2019-01-16 ASSESSMENT — TONOMETRY
OD_IOP_MMHG: 16
IOP_METHOD: APPLANATION
OS_IOP_MMHG: 16

## 2019-01-16 ASSESSMENT — EXTERNAL EXAM - LEFT EYE: OS_EXAM: NORMAL

## 2019-01-24 ENCOUNTER — OFFICE VISIT (OUTPATIENT)
Dept: PULMONOLOGY | Age: 64
End: 2019-01-24

## 2019-01-24 VITALS
SYSTOLIC BLOOD PRESSURE: 126 MMHG | WEIGHT: 256 LBS | DIASTOLIC BLOOD PRESSURE: 70 MMHG | HEART RATE: 92 BPM | BODY MASS INDEX: 42.6 KG/M2 | OXYGEN SATURATION: 98 %

## 2019-01-24 DIAGNOSIS — J45.50 SEVERE PERSISTENT ASTHMA WITHOUT COMPLICATION: Primary | ICD-10-CM

## 2019-01-24 PROCEDURE — 99213 OFFICE O/P EST LOW 20 MIN: CPT | Performed by: INTERNAL MEDICINE

## 2019-01-24 PROCEDURE — 96372 THER/PROPH/DIAG INJ SC/IM: CPT | Performed by: INTERNAL MEDICINE

## 2019-01-24 RX ORDER — FLUCONAZOLE 100 MG/1
100 TABLET ORAL DAILY
Qty: 7 TABLET | Refills: 0 | Status: SHIPPED | OUTPATIENT
Start: 2019-01-24 | End: 2019-06-10 | Stop reason: ALTCHOICE

## 2019-01-24 RX ORDER — AZITHROMYCIN 250 MG/1
500 TABLET, FILM COATED ORAL DAILY
Qty: 14 TABLET | Refills: 0 | Status: SHIPPED | OUTPATIENT
Start: 2019-01-24 | End: 2019-02-12 | Stop reason: SDUPTHER

## 2019-01-31 ENCOUNTER — E-ADVICE (OUTPATIENT)
Dept: INTERNAL MEDICINE | Age: 64
End: 2019-01-31

## 2019-02-03 ENCOUNTER — E-ADVICE (OUTPATIENT)
Dept: NEPHROLOGY | Age: 64
End: 2019-02-03

## 2019-02-03 ENCOUNTER — E-ADVICE (OUTPATIENT)
Dept: NEUROLOGY | Age: 64
End: 2019-02-03

## 2019-02-09 DIAGNOSIS — I20.1 PRINZMETAL ANGINA (CMD): ICD-10-CM

## 2019-02-09 DIAGNOSIS — I25.10 CORONARY ARTERY DISEASE INVOLVING NATIVE CORONARY ARTERY OF NATIVE HEART WITHOUT ANGINA PECTORIS: ICD-10-CM

## 2019-02-11 RX ORDER — OMEPRAZOLE 40 MG/1
CAPSULE, DELAYED RELEASE ORAL
Qty: 180 CAPSULE | Refills: 3 | Status: SHIPPED | OUTPATIENT
Start: 2019-02-11 | End: 2020-04-06 | Stop reason: SDUPTHER

## 2019-02-11 RX ORDER — ISOSORBIDE DINITRATE 10 MG/1
10 TABLET ORAL 3 TIMES DAILY
Qty: 270 TABLET | Refills: 0 | Status: SHIPPED | OUTPATIENT
Start: 2019-02-11 | End: 2019-05-13 | Stop reason: SDUPTHER

## 2019-02-12 ENCOUNTER — NURSE TRIAGE (OUTPATIENT)
Dept: INTERNAL MEDICINE | Age: 64
End: 2019-02-12

## 2019-02-12 RX ORDER — AZITHROMYCIN 250 MG/1
500 TABLET, FILM COATED ORAL DAILY
Qty: 14 TABLET | Refills: 0 | Status: SHIPPED | OUTPATIENT
Start: 2019-02-12 | End: 2019-05-13 | Stop reason: SDUPTHER

## 2019-02-13 RX ORDER — DULOXETIN HYDROCHLORIDE 60 MG/1
CAPSULE, DELAYED RELEASE ORAL
Qty: 180 CAPSULE | Refills: 3 | OUTPATIENT
Start: 2019-02-13

## 2019-02-18 RX ORDER — POLYETHYLENE GLYCOL 3350 17 G/17G
POWDER, FOR SOLUTION ORAL
Qty: 3162 G | Refills: 3 | Status: SHIPPED | OUTPATIENT
Start: 2019-02-18 | End: 2019-02-24 | Stop reason: SDUPTHER

## 2019-02-20 ENCOUNTER — TELEPHONE (OUTPATIENT)
Dept: PULMONOLOGY | Age: 64
End: 2019-02-20

## 2019-02-20 DIAGNOSIS — J45.51 SEVERE PERSISTENT ASTHMA WITH EXACERBATION: Primary | ICD-10-CM

## 2019-02-21 ENCOUNTER — TELEPHONE (OUTPATIENT)
Dept: INTERNAL MEDICINE | Age: 64
End: 2019-02-21

## 2019-02-22 ENCOUNTER — TELEPHONE (OUTPATIENT)
Dept: INTERNAL MEDICINE | Age: 64
End: 2019-02-22

## 2019-02-25 ENCOUNTER — OFFICE VISIT (OUTPATIENT)
Dept: NEUROLOGY | Age: 64
End: 2019-02-25

## 2019-02-25 ENCOUNTER — TELEPHONE (OUTPATIENT)
Dept: INTERNAL MEDICINE | Age: 64
End: 2019-02-25

## 2019-02-25 VITALS
DIASTOLIC BLOOD PRESSURE: 86 MMHG | BODY MASS INDEX: 43.43 KG/M2 | SYSTOLIC BLOOD PRESSURE: 142 MMHG | WEIGHT: 261 LBS | HEART RATE: 83 BPM

## 2019-02-25 DIAGNOSIS — R25.1 TREMOR: ICD-10-CM

## 2019-02-25 DIAGNOSIS — G43.009 MIGRAINE WITHOUT AURA AND WITHOUT STATUS MIGRAINOSUS, NOT INTRACTABLE: ICD-10-CM

## 2019-02-25 DIAGNOSIS — G25.3 MYOCLONUS: Primary | ICD-10-CM

## 2019-02-25 DIAGNOSIS — G31.84 MILD COGNITIVE IMPAIRMENT WITH MEMORY LOSS: ICD-10-CM

## 2019-02-25 PROCEDURE — 99214 OFFICE O/P EST MOD 30 MIN: CPT | Performed by: PSYCHIATRY & NEUROLOGY

## 2019-02-25 RX ORDER — POLYETHYLENE GLYCOL 3350 17 G/17G
17 POWDER, FOR SOLUTION ORAL 2 TIMES DAILY
Qty: 3162 G | Refills: 3 | Status: SHIPPED | OUTPATIENT
Start: 2019-02-25

## 2019-02-25 RX ORDER — METOLAZONE 2.5 MG/1
TABLET ORAL
Status: SHIPPED | COMMUNITY
Start: 2019-02-25 | End: 2019-05-31 | Stop reason: SDUPTHER

## 2019-02-25 RX ORDER — POTASSIUM CHLORIDE 1.5 G/1.58G
20 POWDER, FOR SOLUTION ORAL 2 TIMES DAILY
Status: SHIPPED | COMMUNITY
Start: 2019-02-25 | End: 2019-06-10 | Stop reason: ALTCHOICE

## 2019-02-25 RX ORDER — GABAPENTIN 300 MG/1
300 CAPSULE ORAL NIGHTLY
Qty: 180 CAPSULE | Refills: 3 | Status: SHIPPED | OUTPATIENT
Start: 2019-02-25 | End: 2020-04-06 | Stop reason: SDUPTHER

## 2019-02-25 RX ORDER — GABAPENTIN 100 MG/1
100 CAPSULE ORAL 3 TIMES DAILY
Qty: 360 CAPSULE | Refills: 3 | Status: SHIPPED | OUTPATIENT
Start: 2019-02-25 | End: 2020-03-27 | Stop reason: SDUPTHER

## 2019-02-28 ENCOUNTER — E-ADVICE (OUTPATIENT)
Dept: PULMONOLOGY | Age: 64
End: 2019-02-28

## 2019-02-28 ENCOUNTER — E-ADVICE (OUTPATIENT)
Dept: INTERNAL MEDICINE | Age: 64
End: 2019-02-28

## 2019-03-04 RX ORDER — POTASSIUM CHLORIDE 750 MG/1
TABLET, EXTENDED RELEASE ORAL
Qty: 180 TABLET | Refills: 0 | Status: SHIPPED | OUTPATIENT
Start: 2019-03-04 | End: 2019-03-15 | Stop reason: SDUPTHER

## 2019-03-05 ENCOUNTER — E-ADVICE (OUTPATIENT)
Dept: NEPHROLOGY | Age: 64
End: 2019-03-05

## 2019-03-06 ENCOUNTER — TELEPHONE (OUTPATIENT)
Dept: PULMONOLOGY | Age: 64
End: 2019-03-06

## 2019-03-11 ENCOUNTER — NURSE ONLY (OUTPATIENT)
Dept: INTERNAL MEDICINE | Age: 64
End: 2019-03-11

## 2019-03-11 ENCOUNTER — TELEPHONE (OUTPATIENT)
Dept: PULMONOLOGY | Age: 64
End: 2019-03-11

## 2019-03-11 VITALS
RESPIRATION RATE: 18 BRPM | DIASTOLIC BLOOD PRESSURE: 84 MMHG | HEART RATE: 103 BPM | SYSTOLIC BLOOD PRESSURE: 108 MMHG | OXYGEN SATURATION: 98 %

## 2019-03-11 DIAGNOSIS — J45.50 SEVERE PERSISTENT ASTHMA, UNSPECIFIED WHETHER COMPLICATED: Primary | ICD-10-CM

## 2019-03-11 PROCEDURE — 96372 THER/PROPH/DIAG INJ SC/IM: CPT | Performed by: INTERNAL MEDICINE

## 2019-03-12 ENCOUNTER — OFFICE VISIT (OUTPATIENT)
Dept: ORTHOPEDICS | Age: 64
End: 2019-03-12

## 2019-03-12 VITALS — BODY MASS INDEX: 43.49 KG/M2 | RESPIRATION RATE: 16 BRPM | HEIGHT: 65 IN | WEIGHT: 261 LBS

## 2019-03-12 DIAGNOSIS — M19.032 PRIMARY OSTEOARTHRITIS OF LEFT WRIST: ICD-10-CM

## 2019-03-12 DIAGNOSIS — M19.032 PRIMARY OSTEOARTHRITIS OF BOTH WRISTS: ICD-10-CM

## 2019-03-12 DIAGNOSIS — M19.031 PRIMARY OSTEOARTHRITIS OF BOTH WRISTS: ICD-10-CM

## 2019-03-12 DIAGNOSIS — M18.11 PRIMARY OSTEOARTHRITIS OF FIRST CARPOMETACARPAL JOINT OF RIGHT HAND: Primary | ICD-10-CM

## 2019-03-12 PROCEDURE — 20605 DRAIN/INJ JOINT/BURSA W/O US: CPT | Performed by: ORTHOPAEDIC SURGERY

## 2019-03-12 PROCEDURE — 99212 OFFICE O/P EST SF 10 MIN: CPT | Performed by: ORTHOPAEDIC SURGERY

## 2019-03-12 RX ORDER — METHYLPREDNISOLONE ACETATE 80 MG/ML
40 INJECTION, SUSPENSION INTRA-ARTICULAR; INTRALESIONAL; INTRAMUSCULAR; SOFT TISSUE ONCE
Status: COMPLETED | OUTPATIENT
Start: 2019-03-12 | End: 2019-03-12

## 2019-03-12 RX ORDER — BUSPIRONE HYDROCHLORIDE 10 MG/1
5 TABLET ORAL 2 TIMES DAILY
Qty: 1 TABLET | Refills: 0 | Status: SHIPPED | COMMUNITY
Start: 2019-03-12 | End: 2019-04-19

## 2019-03-12 RX ADMIN — METHYLPREDNISOLONE ACETATE 40 MG: 80 INJECTION, SUSPENSION INTRA-ARTICULAR; INTRALESIONAL; INTRAMUSCULAR; SOFT TISSUE at 11:21

## 2019-03-13 ENCOUNTER — E-ADVICE (OUTPATIENT)
Dept: INTERNAL MEDICINE | Age: 64
End: 2019-03-13

## 2019-03-15 RX ORDER — LEVOTHYROXINE SODIUM 0.05 MG/1
50 TABLET ORAL DAILY
Qty: 90 TABLET | Refills: 3 | Status: SHIPPED | OUTPATIENT
Start: 2019-03-15 | End: 2020-03-19

## 2019-03-15 RX ORDER — POTASSIUM CHLORIDE 750 MG/1
20 TABLET, EXTENDED RELEASE ORAL 2 TIMES DAILY
Qty: 360 TABLET | Refills: 0 | Status: SHIPPED | OUTPATIENT
Start: 2019-03-15 | End: 2019-05-13 | Stop reason: SDUPTHER

## 2019-03-18 ENCOUNTER — TELEPHONE (OUTPATIENT)
Dept: FAMILY MEDICINE | Age: 64
End: 2019-03-18

## 2019-03-18 ENCOUNTER — LAB SERVICES (OUTPATIENT)
Dept: LAB | Age: 64
End: 2019-03-18

## 2019-03-18 ENCOUNTER — OFFICE VISIT (OUTPATIENT)
Dept: OBGYN | Age: 64
End: 2019-03-18

## 2019-03-18 VITALS
HEART RATE: 73 BPM | HEIGHT: 64 IN | SYSTOLIC BLOOD PRESSURE: 120 MMHG | DIASTOLIC BLOOD PRESSURE: 80 MMHG | WEIGHT: 259.4 LBS | BODY MASS INDEX: 44.28 KG/M2

## 2019-03-18 DIAGNOSIS — N39.41 URGE INCONTINENCE OF URINE: Primary | ICD-10-CM

## 2019-03-18 DIAGNOSIS — R32 URINARY INCONTINENCE, UNSPECIFIED TYPE: ICD-10-CM

## 2019-03-18 LAB
APPEARANCE UR: CLEAR
BACTERIA #/AREA URNS HPF: ABNORMAL /HPF
BILIRUB UR QL STRIP: NEGATIVE
COLOR UR: YELLOW
GLUCOSE UR STRIP-MCNC: NEGATIVE MG/DL
HGB UR QL STRIP: NEGATIVE
HYALINE CASTS #/AREA URNS LPF: ABNORMAL /LPF (ref 0–5)
KETONES UR STRIP-MCNC: NEGATIVE MG/DL
LEUKOCYTE ESTERASE UR QL STRIP: NEGATIVE
MUCOUS THREADS URNS QL MICRO: PRESENT
NITRITE UR QL STRIP: NEGATIVE
PH UR STRIP: 5 UNITS (ref 5–7)
PROT UR STRIP-MCNC: NEGATIVE MG/DL
RBC #/AREA URNS HPF: ABNORMAL /HPF (ref 0–3)
SP GR UR STRIP: <1.005 (ref 1–1.03)
SPECIMEN SOURCE: ABNORMAL
SQUAMOUS #/AREA URNS HPF: ABNORMAL /HPF (ref 0–5)
UROBILINOGEN UR STRIP-MCNC: 0.2 MG/DL (ref 0–1)
WBC #/AREA URNS HPF: ABNORMAL /HPF (ref 0–5)

## 2019-03-18 PROCEDURE — 87086 URINE CULTURE/COLONY COUNT: CPT | Performed by: INTERNAL MEDICINE

## 2019-03-18 PROCEDURE — 81001 URINALYSIS AUTO W/SCOPE: CPT | Performed by: INTERNAL MEDICINE

## 2019-03-18 PROCEDURE — 99202 OFFICE O/P NEW SF 15 MIN: CPT | Performed by: OBSTETRICS & GYNECOLOGY

## 2019-03-18 SDOH — HEALTH STABILITY: MENTAL HEALTH: HOW OFTEN DO YOU HAVE A DRINK CONTAINING ALCOHOL?: NEVER

## 2019-03-20 ENCOUNTER — E-ADVICE (OUTPATIENT)
Dept: OBGYN | Age: 64
End: 2019-03-20

## 2019-03-20 LAB
BACTERIA UR CULT: NORMAL
REPORT STATUS (RPT): NORMAL
SPECIMEN SOURCE: NORMAL

## 2019-03-21 ENCOUNTER — E-ADVICE (OUTPATIENT)
Dept: OBGYN | Age: 64
End: 2019-03-21

## 2019-03-21 DIAGNOSIS — N39.41 URGE INCONTINENCE OF URINE: Primary | ICD-10-CM

## 2019-03-23 ENCOUNTER — E-ADVICE (OUTPATIENT)
Dept: ALLERGY | Age: 64
End: 2019-03-23

## 2019-03-25 ENCOUNTER — TELEPHONE (OUTPATIENT)
Dept: ALLERGY | Age: 64
End: 2019-03-25

## 2019-03-25 RX ORDER — PREDNISONE 10 MG/1
40 TABLET ORAL ONCE
Qty: 12 TABLET | Refills: 3 | Status: SHIPPED | OUTPATIENT
Start: 2019-03-25 | End: 2019-03-25

## 2019-04-02 ENCOUNTER — TELEPHONE (OUTPATIENT)
Dept: PULMONOLOGY | Age: 64
End: 2019-04-02

## 2019-04-04 ENCOUNTER — TELEPHONE (OUTPATIENT)
Dept: INTERNAL MEDICINE | Age: 64
End: 2019-04-04

## 2019-04-04 DIAGNOSIS — R25.2 MUSCLE CRAMPS AT NIGHT: Primary | ICD-10-CM

## 2019-04-08 ENCOUNTER — LAB SERVICES (OUTPATIENT)
Dept: LAB | Age: 64
End: 2019-04-08

## 2019-04-08 DIAGNOSIS — R25.2 MUSCLE CRAMPS AT NIGHT: ICD-10-CM

## 2019-04-08 LAB
ALBUMIN SERPL-MCNC: 4.1 G/DL (ref 3.6–5.1)
ALBUMIN/GLOB SERPL: 1.2 {RATIO} (ref 1–2.4)
ALP SERPL-CCNC: 84 UNITS/L (ref 45–117)
ALT SERPL-CCNC: 29 UNITS/L
ANION GAP SERPL CALC-SCNC: 13 MMOL/L (ref 10–20)
AST SERPL-CCNC: 36 UNITS/L
BILIRUB SERPL-MCNC: 0.5 MG/DL (ref 0.2–1)
BUN SERPL-MCNC: 37 MG/DL (ref 6–20)
BUN/CREAT SERPL: 22 (ref 7–25)
CALCIUM SERPL-MCNC: 8.8 MG/DL (ref 8.4–10.2)
CHLORIDE SERPL-SCNC: 86 MMOL/L (ref 98–107)
CO2 SERPL-SCNC: 31 MMOL/L (ref 21–32)
CREAT SERPL-MCNC: 1.71 MG/DL (ref 0.51–0.95)
FASTING STATUS PATIENT QL REPORTED: 0 HRS
GLOBULIN SER-MCNC: 3.3 G/DL (ref 2–4)
GLUCOSE SERPL-MCNC: 168 MG/DL (ref 65–99)
MAGNESIUM SERPL-MCNC: 2.2 MG/DL (ref 1.7–2.4)
POTASSIUM SERPL-SCNC: 3.3 MMOL/L (ref 3.4–5.1)
PROT SERPL-MCNC: 7.4 G/DL (ref 6.4–8.2)
SODIUM SERPL-SCNC: 127 MMOL/L (ref 135–145)

## 2019-04-08 PROCEDURE — 83735 ASSAY OF MAGNESIUM: CPT | Performed by: INTERNAL MEDICINE

## 2019-04-08 PROCEDURE — 80053 COMPREHEN METABOLIC PANEL: CPT | Performed by: INTERNAL MEDICINE

## 2019-04-08 PROCEDURE — 36415 COLL VENOUS BLD VENIPUNCTURE: CPT | Performed by: INTERNAL MEDICINE

## 2019-04-09 ENCOUNTER — TELEPHONE (OUTPATIENT)
Dept: INTERNAL MEDICINE | Age: 64
End: 2019-04-09

## 2019-04-09 ENCOUNTER — E-ADVICE (OUTPATIENT)
Dept: NEPHROLOGY | Age: 64
End: 2019-04-09

## 2019-04-11 ENCOUNTER — NURSE ONLY (OUTPATIENT)
Dept: PULMONOLOGY | Age: 64
End: 2019-04-11

## 2019-04-11 DIAGNOSIS — J45.51 SEVERE PERSISTENT ASTHMA WITH EXACERBATION: Primary | ICD-10-CM

## 2019-04-11 PROCEDURE — 96372 THER/PROPH/DIAG INJ SC/IM: CPT | Performed by: INTERNAL MEDICINE

## 2019-04-11 RX ORDER — EPINEPHRINE 0.3 MG/.3ML
0.3 INJECTION SUBCUTANEOUS
Qty: 1 EACH | Refills: 1 | Status: SHIPPED | OUTPATIENT
Start: 2019-04-11 | End: 2019-12-20 | Stop reason: SDUPTHER

## 2019-04-17 ENCOUNTER — TELEPHONE (OUTPATIENT)
Dept: INTERNAL MEDICINE | Age: 64
End: 2019-04-17

## 2019-04-17 DIAGNOSIS — I10 ESSENTIAL HYPERTENSION WITH GOAL BLOOD PRESSURE LESS THAN 140/90: ICD-10-CM

## 2019-04-17 DIAGNOSIS — E87.6 LOW BLOOD POTASSIUM: ICD-10-CM

## 2019-04-17 DIAGNOSIS — E11.9 TYPE 2 DIABETES MELLITUS WITHOUT COMPLICATION, WITHOUT LONG-TERM CURRENT USE OF INSULIN (CMD): Primary | ICD-10-CM

## 2019-04-17 DIAGNOSIS — N18.30 CKD (CHRONIC KIDNEY DISEASE), STAGE III (CMD): ICD-10-CM

## 2019-04-19 ENCOUNTER — TELEPHONE (OUTPATIENT)
Dept: INTERNAL MEDICINE | Age: 64
End: 2019-04-19

## 2019-04-19 ENCOUNTER — LAB SERVICES (OUTPATIENT)
Dept: LAB | Age: 64
End: 2019-04-19

## 2019-04-19 DIAGNOSIS — E87.6 LOW BLOOD POTASSIUM: ICD-10-CM

## 2019-04-19 DIAGNOSIS — E11.9 TYPE 2 DIABETES MELLITUS WITHOUT COMPLICATION, WITHOUT LONG-TERM CURRENT USE OF INSULIN (CMD): ICD-10-CM

## 2019-04-19 LAB
ANION GAP SERPL CALC-SCNC: 15 MMOL/L (ref 10–20)
BUN SERPL-MCNC: 19 MG/DL (ref 6–20)
BUN/CREAT SERPL: 14 (ref 7–25)
CALCIUM SERPL-MCNC: 9.1 MG/DL (ref 8.4–10.2)
CHLORIDE SERPL-SCNC: 97 MMOL/L (ref 98–107)
CO2 SERPL-SCNC: 27 MMOL/L (ref 21–32)
CREAT SERPL-MCNC: 1.4 MG/DL (ref 0.51–0.95)
FASTING STATUS PATIENT QL REPORTED: 2.5 HRS
GLUCOSE SERPL-MCNC: 108 MG/DL (ref 65–99)
HBA1C MFR BLD: 7.2 % (ref 4.5–5.6)
POTASSIUM SERPL-SCNC: 4.7 MMOL/L (ref 3.4–5.1)
SODIUM SERPL-SCNC: 134 MMOL/L (ref 135–145)

## 2019-04-19 PROCEDURE — 36415 COLL VENOUS BLD VENIPUNCTURE: CPT | Performed by: INTERNAL MEDICINE

## 2019-04-19 PROCEDURE — 83036 HEMOGLOBIN GLYCOSYLATED A1C: CPT | Performed by: INTERNAL MEDICINE

## 2019-04-19 PROCEDURE — 80048 BASIC METABOLIC PNL TOTAL CA: CPT | Performed by: INTERNAL MEDICINE

## 2019-04-19 RX ORDER — DULOXETIN HYDROCHLORIDE 30 MG/1
CAPSULE, DELAYED RELEASE ORAL
Status: SHIPPED | COMMUNITY
Start: 2019-04-19 | End: 2019-06-10 | Stop reason: ALTCHOICE

## 2019-04-22 ENCOUNTER — TELEPHONE (OUTPATIENT)
Dept: OPHTHALMOLOGY | Age: 64
End: 2019-04-22

## 2019-04-23 ENCOUNTER — TELEPHONE (OUTPATIENT)
Dept: OPHTHALMOLOGY | Age: 64
End: 2019-04-23

## 2019-04-23 RX ORDER — FLUTICASONE PROPIONATE AND SALMETEROL 50; 500 UG/1; UG/1
POWDER RESPIRATORY (INHALATION)
Qty: 3 EACH | Refills: 1 | Status: SHIPPED | OUTPATIENT
Start: 2019-04-23 | End: 2019-10-01 | Stop reason: SDUPTHER

## 2019-04-25 ENCOUNTER — OFFICE VISIT (OUTPATIENT)
Dept: OBGYN | Age: 64
End: 2019-04-25
Attending: OBSTETRICS & GYNECOLOGY

## 2019-04-25 VITALS
WEIGHT: 250 LBS | DIASTOLIC BLOOD PRESSURE: 88 MMHG | HEIGHT: 63 IN | BODY MASS INDEX: 44.3 KG/M2 | HEART RATE: 90 BPM | SYSTOLIC BLOOD PRESSURE: 124 MMHG

## 2019-04-25 DIAGNOSIS — N94.10 DYSPAREUNIA, FEMALE: ICD-10-CM

## 2019-04-25 DIAGNOSIS — N32.81 OAB (OVERACTIVE BLADDER): Primary | ICD-10-CM

## 2019-04-25 PROCEDURE — 99244 OFF/OP CNSLTJ NEW/EST MOD 40: CPT | Performed by: OBSTETRICS & GYNECOLOGY

## 2019-04-25 SDOH — HEALTH STABILITY: MENTAL HEALTH: HOW OFTEN DO YOU HAVE A DRINK CONTAINING ALCOHOL?: NEVER

## 2019-04-26 ENCOUNTER — TELEPHONE (OUTPATIENT)
Dept: OBGYN | Age: 64
End: 2019-04-26

## 2019-04-26 RX ORDER — FESOTERODINE FUMARATE 4 MG/1
4 TABLET, EXTENDED RELEASE ORAL DAILY
Qty: 30 TABLET | Refills: 11 | Status: SHIPPED | OUTPATIENT
Start: 2019-04-26 | End: 2019-06-10 | Stop reason: CLARIF

## 2019-04-27 ENCOUNTER — OFFICE VISIT (OUTPATIENT)
Dept: CHIROPRACTIC MEDICINE | Age: 64
End: 2019-04-27

## 2019-04-27 DIAGNOSIS — M99.03 LUMBAR REGION SOMATIC DYSFUNCTION: ICD-10-CM

## 2019-04-27 DIAGNOSIS — M54.50 CHRONIC BILATERAL LOW BACK PAIN WITHOUT SCIATICA: Primary | ICD-10-CM

## 2019-04-27 DIAGNOSIS — M99.05 PELVIC SOMATIC DYSFUNCTION: ICD-10-CM

## 2019-04-27 DIAGNOSIS — G89.29 CHRONIC BILATERAL LOW BACK PAIN WITHOUT SCIATICA: Primary | ICD-10-CM

## 2019-04-27 DIAGNOSIS — M99.04 SACRAL REGION SOMATIC DYSFUNCTION: ICD-10-CM

## 2019-04-27 PROCEDURE — 98941 CHIROPRACT MANJ 3-4 REGIONS: CPT | Performed by: CHIROPRACTOR

## 2019-04-29 ENCOUNTER — LAB SERVICES (OUTPATIENT)
Dept: LAB | Age: 64
End: 2019-04-29

## 2019-04-29 ENCOUNTER — OFFICE VISIT (OUTPATIENT)
Dept: RHEUMATOLOGY | Age: 64
End: 2019-04-29

## 2019-04-29 VITALS
WEIGHT: 253 LBS | SYSTOLIC BLOOD PRESSURE: 122 MMHG | BODY MASS INDEX: 44.82 KG/M2 | HEART RATE: 72 BPM | DIASTOLIC BLOOD PRESSURE: 64 MMHG

## 2019-04-29 DIAGNOSIS — M25.542 ARTHRALGIA OF BOTH HANDS: ICD-10-CM

## 2019-04-29 DIAGNOSIS — M25.541 ARTHRALGIA OF BOTH HANDS: ICD-10-CM

## 2019-04-29 DIAGNOSIS — M15.9 GOA (GENERALIZED OSTEOARTHRITIS): ICD-10-CM

## 2019-04-29 DIAGNOSIS — M79.7 FIBROMYALGIA: ICD-10-CM

## 2019-04-29 DIAGNOSIS — M15.9 GOA (GENERALIZED OSTEOARTHRITIS): Primary | ICD-10-CM

## 2019-04-29 LAB — ERYTHROCYTE [SEDIMENTATION RATE] IN BLOOD: 16 MM/HR (ref 0–20)

## 2019-04-29 PROCEDURE — 86431 RHEUMATOID FACTOR QUANT: CPT | Performed by: INTERNAL MEDICINE

## 2019-04-29 PROCEDURE — 99214 OFFICE O/P EST MOD 30 MIN: CPT | Performed by: INTERNAL MEDICINE

## 2019-04-29 PROCEDURE — 86200 CCP ANTIBODY: CPT | Performed by: INTERNAL MEDICINE

## 2019-04-29 PROCEDURE — 86140 C-REACTIVE PROTEIN: CPT | Performed by: INTERNAL MEDICINE

## 2019-04-29 PROCEDURE — 85652 RBC SED RATE AUTOMATED: CPT | Performed by: INTERNAL MEDICINE

## 2019-04-29 PROCEDURE — 36415 COLL VENOUS BLD VENIPUNCTURE: CPT | Performed by: INTERNAL MEDICINE

## 2019-04-30 LAB
CCP AB SER IA-ACNC: 12 UNITS
CRP SERPL-MCNC: <0.3 MG/DL
RHEUMATOID FACT SERPL-ACNC: <10 UNITS/ML

## 2019-05-03 ENCOUNTER — TELEPHONE (OUTPATIENT)
Dept: RHEUMATOLOGY | Age: 64
End: 2019-05-03

## 2019-05-03 DIAGNOSIS — M25.542 ARTHRALGIA OF BOTH HANDS: Primary | ICD-10-CM

## 2019-05-03 DIAGNOSIS — M25.541 ARTHRALGIA OF BOTH HANDS: Primary | ICD-10-CM

## 2019-05-03 DIAGNOSIS — M19.90 OSTEOARTHRITIS, UNSPECIFIED OSTEOARTHRITIS TYPE, UNSPECIFIED SITE: ICD-10-CM

## 2019-05-03 RX ORDER — CODEINE PHOSPHATE AND GUAIFENESIN 10; 100 MG/5ML; MG/5ML
5 SOLUTION ORAL 3 TIMES DAILY PRN
Qty: 240 ML | Refills: 0 | Status: SHIPPED | OUTPATIENT
Start: 2019-05-03 | End: 2019-07-22 | Stop reason: SDUPTHER

## 2019-05-08 ENCOUNTER — OFFICE VISIT (OUTPATIENT)
Dept: OPHTHALMOLOGY | Age: 64
End: 2019-05-08

## 2019-05-08 DIAGNOSIS — H40.039 ANATOMICAL NARROW ANGLE: ICD-10-CM

## 2019-05-08 DIAGNOSIS — H49.12: ICD-10-CM

## 2019-05-08 DIAGNOSIS — H25.813 COMBINED FORMS OF AGE-RELATED CATARACT OF BOTH EYES: Primary | ICD-10-CM

## 2019-05-08 PROCEDURE — 92014 COMPRE OPH EXAM EST PT 1/>: CPT | Performed by: OPHTHALMOLOGY

## 2019-05-08 PROCEDURE — 92133 CPTRZD OPH DX IMG PST SGM ON: CPT | Performed by: OPHTHALMOLOGY

## 2019-05-08 PROCEDURE — 76514 ECHO EXAM OF EYE THICKNESS: CPT | Performed by: OPHTHALMOLOGY

## 2019-05-08 ASSESSMENT — EXTERNAL EXAM - LEFT EYE: OS_EXAM: NORMAL

## 2019-05-08 ASSESSMENT — VISUAL ACUITY
METHOD: SNELLEN - LINEAR
OD_PH_CC: 20/25
OD_PH_CC+: -1
CORRECTION_TYPE: GLASSES
OD_CC: 20/25
OS_CC+: -1
OS_CC: 20/25
OS_CC: 20/50
OS_PH_CC: 20/25
OD_CC: 20/40
OD_BAT_HIGH: 20/40
OS_BAT_HIGH: 20/50

## 2019-05-08 ASSESSMENT — SLIT LAMP EXAM - LIDS
COMMENTS: NORMAL
COMMENTS: NORMAL

## 2019-05-08 ASSESSMENT — REFRACTION_WEARINGRX
OS_AXIS: 155
OD_CYLINDER: +0.50
OD_AXIS: 180
SPECS_TYPE: PROGRESSIVE
OD_SPHERE: +0.75
OD_ADD: +2.50
OS_ADD: +2.50
OS_CYLINDER: +0.50
OS_SPHERE: +0.25

## 2019-05-08 ASSESSMENT — PACHYMETRY
OS_CT(UM): 616
OD_CT(UM): 618

## 2019-05-08 ASSESSMENT — ENCOUNTER SYMPTOMS
HEMATOLOGIC/LYMPHATIC NEGATIVE: 0
ALLERGIC/IMMUNOLOGIC NEGATIVE: 0
EYES NEGATIVE: 1
ENDOCRINE NEGATIVE: 1
RESPIRATORY NEGATIVE: 1
CONSTITUTIONAL NEGATIVE: 0
NEUROLOGICAL NEGATIVE: 1
GASTROINTESTINAL NEGATIVE: 0
PSYCHIATRIC NEGATIVE: 0

## 2019-05-08 ASSESSMENT — TONOMETRY
IOP_METHOD: APPLANATION
OS_IOP_MMHG: 16
OD_IOP_MMHG: 16

## 2019-05-08 ASSESSMENT — REFRACTION_MANIFEST
OD_CYLINDER: +0.50
OD_ADD: +2.50
OS_ADD: +2.50
OS_SPHERE: +0.75
OD_AXIS: 180
OD_SPHERE: +0.25
OS_CYLINDER: SPHERE

## 2019-05-08 ASSESSMENT — CONF VISUAL FIELD
METHOD: COUNTING FINGERS
OD_NORMAL: 1
OS_NORMAL: 1

## 2019-05-08 ASSESSMENT — CUP TO DISC RATIO
OD_RATIO: 0.3
OS_RATIO: 0.3

## 2019-05-08 ASSESSMENT — EXTERNAL EXAM - RIGHT EYE: OD_EXAM: NORMAL

## 2019-05-10 ENCOUNTER — TELEPHONE (OUTPATIENT)
Dept: RHEUMATOLOGY | Age: 64
End: 2019-05-10

## 2019-05-10 ENCOUNTER — TELEPHONE (OUTPATIENT)
Dept: PULMONOLOGY | Age: 64
End: 2019-05-10

## 2019-05-13 ENCOUNTER — TELEPHONE (OUTPATIENT)
Dept: INTERNAL MEDICINE | Age: 64
End: 2019-05-13

## 2019-05-13 DIAGNOSIS — I25.10 CORONARY ARTERY DISEASE INVOLVING NATIVE CORONARY ARTERY OF NATIVE HEART WITHOUT ANGINA PECTORIS: ICD-10-CM

## 2019-05-13 DIAGNOSIS — I20.1 PRINZMETAL ANGINA (CMD): ICD-10-CM

## 2019-05-13 RX ORDER — POTASSIUM CHLORIDE 750 MG/1
TABLET, EXTENDED RELEASE ORAL
Qty: 360 TABLET | Refills: 0 | Status: SHIPPED | OUTPATIENT
Start: 2019-05-13 | End: 2019-07-21 | Stop reason: SDUPTHER

## 2019-05-13 RX ORDER — ISOSORBIDE DINITRATE 10 MG/1
10 TABLET ORAL 3 TIMES DAILY
Qty: 270 TABLET | Refills: 0 | Status: SHIPPED | OUTPATIENT
Start: 2019-05-13 | End: 2019-06-14 | Stop reason: SDUPTHER

## 2019-05-13 RX ORDER — AZITHROMYCIN 250 MG/1
500 TABLET, FILM COATED ORAL DAILY
Qty: 14 TABLET | Refills: 0 | Status: SHIPPED | OUTPATIENT
Start: 2019-05-13 | End: 2019-06-24 | Stop reason: SDUPTHER

## 2019-05-14 RX ORDER — POTASSIUM CITRATE 10 MEQ/1
TABLET, EXTENDED RELEASE ORAL DAILY
Qty: 90 TABLET | Refills: 0 | OUTPATIENT
Start: 2019-05-14

## 2019-05-14 RX ORDER — FUROSEMIDE 40 MG/1
40 TABLET ORAL DAILY
Qty: 90 TABLET | Refills: 0 | Status: SHIPPED | OUTPATIENT
Start: 2019-05-14 | End: 2019-06-10 | Stop reason: CLARIF

## 2019-05-15 RX ORDER — POTASSIUM CITRATE 10 MEQ/1
TABLET, EXTENDED RELEASE ORAL DAILY
Qty: 100 TABLET | OUTPATIENT
Start: 2019-05-15

## 2019-05-16 ENCOUNTER — IMAGING SERVICES (OUTPATIENT)
Dept: MRI IMAGING | Age: 64
End: 2019-05-16
Attending: INTERNAL MEDICINE

## 2019-05-16 ENCOUNTER — TELEPHONE (OUTPATIENT)
Dept: INTERNAL MEDICINE | Age: 64
End: 2019-05-16

## 2019-05-16 DIAGNOSIS — M25.541 ARTHRALGIA OF BOTH HANDS: ICD-10-CM

## 2019-05-16 DIAGNOSIS — M25.542 ARTHRALGIA OF BOTH HANDS: ICD-10-CM

## 2019-05-16 DIAGNOSIS — M19.90 OSTEOARTHRITIS, UNSPECIFIED OSTEOARTHRITIS TYPE, UNSPECIFIED SITE: ICD-10-CM

## 2019-05-16 PROCEDURE — 73218 MRI UPPER EXTREMITY W/O DYE: CPT | Performed by: RADIOLOGY

## 2019-05-20 ENCOUNTER — OFFICE VISIT (OUTPATIENT)
Dept: PULMONOLOGY | Age: 64
End: 2019-05-20

## 2019-05-20 VITALS
SYSTOLIC BLOOD PRESSURE: 120 MMHG | OXYGEN SATURATION: 96 % | WEIGHT: 251.99 LBS | DIASTOLIC BLOOD PRESSURE: 70 MMHG | HEART RATE: 100 BPM | BODY MASS INDEX: 44.64 KG/M2

## 2019-05-20 DIAGNOSIS — J45.50 SEVERE PERSISTENT ASTHMA, WELL-CONTROLLED: Primary | ICD-10-CM

## 2019-05-20 PROCEDURE — 99214 OFFICE O/P EST MOD 30 MIN: CPT | Performed by: INTERNAL MEDICINE

## 2019-05-20 PROCEDURE — 96372 THER/PROPH/DIAG INJ SC/IM: CPT | Performed by: INTERNAL MEDICINE

## 2019-05-21 ENCOUNTER — TELEPHONE (OUTPATIENT)
Dept: PULMONOLOGY | Age: 64
End: 2019-05-21

## 2019-05-21 ENCOUNTER — OFFICE VISIT (OUTPATIENT)
Dept: ORTHOPEDICS | Age: 64
End: 2019-05-21

## 2019-05-21 ENCOUNTER — IMAGING SERVICES (OUTPATIENT)
Dept: GENERAL RADIOLOGY | Age: 64
End: 2019-05-21
Attending: PHYSICIAN ASSISTANT

## 2019-05-21 DIAGNOSIS — M18.12 PRIMARY OSTEOARTHRITIS OF FIRST CARPOMETACARPAL JOINT OF LEFT HAND: Primary | ICD-10-CM

## 2019-05-21 DIAGNOSIS — Z96.659 STATUS POST TOTAL KNEE REPLACEMENT, UNSPECIFIED LATERALITY: ICD-10-CM

## 2019-05-21 PROCEDURE — 99213 OFFICE O/P EST LOW 20 MIN: CPT | Performed by: ORTHOPAEDIC SURGERY

## 2019-05-21 PROCEDURE — 20600 DRAIN/INJ JOINT/BURSA W/O US: CPT | Performed by: ORTHOPAEDIC SURGERY

## 2019-05-21 RX ORDER — METHYLPREDNISOLONE ACETATE 80 MG/ML
20 INJECTION, SUSPENSION INTRA-ARTICULAR; INTRALESIONAL; INTRAMUSCULAR; SOFT TISSUE ONCE
Status: COMPLETED | OUTPATIENT
Start: 2019-05-21 | End: 2019-05-21

## 2019-05-21 RX ADMIN — METHYLPREDNISOLONE ACETATE 20 MG: 80 INJECTION, SUSPENSION INTRA-ARTICULAR; INTRALESIONAL; INTRAMUSCULAR; SOFT TISSUE at 12:03

## 2019-05-24 ENCOUNTER — OFFICE VISIT (OUTPATIENT)
Dept: ORTHOPEDICS | Age: 64
End: 2019-05-24

## 2019-05-24 ENCOUNTER — IMAGING SERVICES (OUTPATIENT)
Dept: GENERAL RADIOLOGY | Age: 64
End: 2019-05-24
Attending: PHYSICIAN ASSISTANT

## 2019-05-24 VITALS — BODY MASS INDEX: 44.64 KG/M2 | WEIGHT: 251.99 LBS | RESPIRATION RATE: 20 BRPM

## 2019-05-24 DIAGNOSIS — G89.29 CHRONIC PAIN OF BOTH KNEES: ICD-10-CM

## 2019-05-24 DIAGNOSIS — M25.562 CHRONIC PAIN OF BOTH KNEES: ICD-10-CM

## 2019-05-24 DIAGNOSIS — Z96.659 STATUS POST TOTAL KNEE REPLACEMENT, UNSPECIFIED LATERALITY: Primary | ICD-10-CM

## 2019-05-24 DIAGNOSIS — M70.50 PES ANSERINE BURSITIS: ICD-10-CM

## 2019-05-24 DIAGNOSIS — M25.561 CHRONIC PAIN OF BOTH KNEES: ICD-10-CM

## 2019-05-24 DIAGNOSIS — Z96.651 STATUS POST TOTAL RIGHT KNEE REPLACEMENT: ICD-10-CM

## 2019-05-24 PROCEDURE — 73562 X-RAY EXAM OF KNEE 3: CPT | Performed by: RADIOLOGY

## 2019-05-24 PROCEDURE — 99213 OFFICE O/P EST LOW 20 MIN: CPT | Performed by: PHYSICIAN ASSISTANT

## 2019-05-24 RX ORDER — METHYLPREDNISOLONE 4 MG/1
4 TABLET ORAL SEE ADMIN INSTRUCTIONS
Qty: 21 TABLET | Refills: 0 | Status: SHIPPED | OUTPATIENT
Start: 2019-05-24 | End: 2019-07-15 | Stop reason: ALTCHOICE

## 2019-05-28 ENCOUNTER — TELEPHONE (OUTPATIENT)
Dept: INTERNAL MEDICINE | Age: 64
End: 2019-05-28

## 2019-05-28 ENCOUNTER — LAB SERVICES (OUTPATIENT)
Dept: LAB | Age: 64
End: 2019-05-28

## 2019-05-28 ENCOUNTER — E-ADVICE (OUTPATIENT)
Dept: INTERNAL MEDICINE | Age: 64
End: 2019-05-28

## 2019-05-28 ENCOUNTER — NURSE TRIAGE (OUTPATIENT)
Dept: INTERNAL MEDICINE | Age: 64
End: 2019-05-28

## 2019-05-28 DIAGNOSIS — R39.9 GU (GENITOURINARY) SYMPTOMS: ICD-10-CM

## 2019-05-28 DIAGNOSIS — N32.81 OAB (OVERACTIVE BLADDER): ICD-10-CM

## 2019-05-28 DIAGNOSIS — R39.9 GU (GENITOURINARY) SYMPTOMS: Primary | ICD-10-CM

## 2019-05-28 LAB
APPEARANCE UR: ABNORMAL
BACTERIA #/AREA URNS HPF: ABNORMAL /HPF
BILIRUB UR QL STRIP: NEGATIVE
COLOR UR: YELLOW
GLUCOSE UR STRIP-MCNC: NEGATIVE MG/DL
HGB UR QL STRIP: ABNORMAL
HYALINE CASTS #/AREA URNS LPF: ABNORMAL /LPF (ref 0–5)
KETONES UR STRIP-MCNC: NEGATIVE MG/DL
LEUKOCYTE ESTERASE UR QL STRIP: ABNORMAL
NITRITE UR QL STRIP: NEGATIVE
PH UR STRIP: 5.5 UNITS (ref 5–7)
PROT UR STRIP-MCNC: NEGATIVE MG/DL
RBC #/AREA URNS HPF: ABNORMAL /HPF (ref 0–2)
SP GR UR STRIP: <1.005 (ref 1–1.03)
SPECIMEN SOURCE: ABNORMAL
SQUAMOUS #/AREA URNS HPF: ABNORMAL /HPF (ref 0–5)
UROBILINOGEN UR STRIP-MCNC: 0.2 MG/DL (ref 0–1)
WBC #/AREA URNS HPF: ABNORMAL /HPF (ref 0–5)

## 2019-05-28 PROCEDURE — 81001 URINALYSIS AUTO W/SCOPE: CPT | Performed by: INTERNAL MEDICINE

## 2019-05-28 PROCEDURE — 87077 CULTURE AEROBIC IDENTIFY: CPT | Performed by: INTERNAL MEDICINE

## 2019-05-28 PROCEDURE — 87086 URINE CULTURE/COLONY COUNT: CPT | Performed by: INTERNAL MEDICINE

## 2019-05-28 RX ORDER — LEVOFLOXACIN 250 MG/1
250 TABLET, FILM COATED ORAL DAILY
Qty: 5 TABLET | Refills: 0 | Status: SHIPPED | OUTPATIENT
Start: 2019-05-28 | End: 2019-06-03

## 2019-05-29 LAB
BACTERIA UR CULT: ABNORMAL
REPORT STATUS (RPT): ABNORMAL
SPECIMEN SOURCE: ABNORMAL

## 2019-05-30 ENCOUNTER — TELEPHONE (OUTPATIENT)
Dept: INTERNAL MEDICINE | Age: 64
End: 2019-05-30

## 2019-05-31 RX ORDER — METOLAZONE 5 MG/1
5 TABLET ORAL
Qty: 15 TABLET | Refills: 2 | Status: ON HOLD | OUTPATIENT
Start: 2019-05-31 | End: 2019-06-16 | Stop reason: HOSPADM

## 2019-05-31 RX ORDER — FUROSEMIDE 40 MG/1
40 TABLET ORAL 2 TIMES DAILY
Qty: 180 TABLET | Refills: 3 | Status: SHIPPED | OUTPATIENT
Start: 2019-05-31 | End: 2019-06-11 | Stop reason: DRUGHIGH

## 2019-06-05 ENCOUNTER — OFFICE VISIT (OUTPATIENT)
Dept: ALLERGY | Age: 64
End: 2019-06-05

## 2019-06-05 VITALS
TEMPERATURE: 97.2 F | HEIGHT: 64 IN | WEIGHT: 253.53 LBS | RESPIRATION RATE: 18 BRPM | OXYGEN SATURATION: 96 % | HEART RATE: 108 BPM | DIASTOLIC BLOOD PRESSURE: 88 MMHG | SYSTOLIC BLOOD PRESSURE: 124 MMHG | BODY MASS INDEX: 43.28 KG/M2

## 2019-06-05 DIAGNOSIS — J45.51 SEVERE PERSISTENT ASTHMA WITH EXACERBATION: ICD-10-CM

## 2019-06-05 DIAGNOSIS — D80.1 HYPOGAMMAGLOBULINEMIA (CMD): Primary | ICD-10-CM

## 2019-06-05 PROCEDURE — 99213 OFFICE O/P EST LOW 20 MIN: CPT | Performed by: ALLERGY & IMMUNOLOGY

## 2019-06-06 ENCOUNTER — TELEPHONE (OUTPATIENT)
Dept: PULMONOLOGY | Age: 64
End: 2019-06-06

## 2019-06-06 ENCOUNTER — OFFICE VISIT (OUTPATIENT)
Dept: OBGYN | Age: 64
End: 2019-06-06

## 2019-06-06 VITALS — HEART RATE: 72 BPM | SYSTOLIC BLOOD PRESSURE: 120 MMHG | DIASTOLIC BLOOD PRESSURE: 80 MMHG

## 2019-06-06 DIAGNOSIS — N94.10 DYSPAREUNIA, FEMALE: ICD-10-CM

## 2019-06-06 DIAGNOSIS — N39.41 URGE INCONTINENCE OF URINE: Primary | ICD-10-CM

## 2019-06-06 PROCEDURE — 99213 OFFICE O/P EST LOW 20 MIN: CPT | Performed by: OBSTETRICS & GYNECOLOGY

## 2019-06-06 RX ORDER — FESOTERODINE FUMARATE 4 MG/1
4 TABLET, EXTENDED RELEASE ORAL DAILY
Qty: 90 TABLET | Refills: 4 | Status: SHIPPED | OUTPATIENT
Start: 2019-06-06 | End: 2019-10-25 | Stop reason: SDUPTHER

## 2019-06-10 ENCOUNTER — OFFICE VISIT (OUTPATIENT)
Dept: RHEUMATOLOGY | Age: 64
End: 2019-06-10

## 2019-06-10 VITALS
DIASTOLIC BLOOD PRESSURE: 68 MMHG | SYSTOLIC BLOOD PRESSURE: 132 MMHG | WEIGHT: 251.32 LBS | BODY MASS INDEX: 43.14 KG/M2 | HEART RATE: 80 BPM

## 2019-06-10 DIAGNOSIS — M25.542 ARTHRALGIA OF BOTH HANDS: Primary | ICD-10-CM

## 2019-06-10 DIAGNOSIS — M79.7 FIBROMYALGIA: ICD-10-CM

## 2019-06-10 DIAGNOSIS — M15.9 GOA (GENERALIZED OSTEOARTHRITIS): ICD-10-CM

## 2019-06-10 DIAGNOSIS — M25.541 ARTHRALGIA OF BOTH HANDS: Primary | ICD-10-CM

## 2019-06-10 PROCEDURE — 99214 OFFICE O/P EST MOD 30 MIN: CPT | Performed by: INTERNAL MEDICINE

## 2019-06-11 ENCOUNTER — OFFICE VISIT (OUTPATIENT)
Dept: INTERNAL MEDICINE | Age: 64
End: 2019-06-11

## 2019-06-11 VITALS
BODY MASS INDEX: 43.26 KG/M2 | RESPIRATION RATE: 16 BRPM | DIASTOLIC BLOOD PRESSURE: 60 MMHG | SYSTOLIC BLOOD PRESSURE: 110 MMHG | OXYGEN SATURATION: 97 % | WEIGHT: 252 LBS | HEART RATE: 102 BPM

## 2019-06-11 DIAGNOSIS — J45.909 INTRINSIC ASTHMA: ICD-10-CM

## 2019-06-11 DIAGNOSIS — I10 ESSENTIAL HYPERTENSION WITH GOAL BLOOD PRESSURE LESS THAN 140/90: ICD-10-CM

## 2019-06-11 DIAGNOSIS — E11.9 TYPE 2 DIABETES MELLITUS WITHOUT COMPLICATION, WITHOUT LONG-TERM CURRENT USE OF INSULIN (CMD): Primary | ICD-10-CM

## 2019-06-11 PROCEDURE — 99213 OFFICE O/P EST LOW 20 MIN: CPT | Performed by: INTERNAL MEDICINE

## 2019-06-11 RX ORDER — FUROSEMIDE 40 MG/1
80 TABLET ORAL DAILY
Qty: 180 TABLET | Refills: 3 | Status: SHIPPED | COMMUNITY
Start: 2019-06-11 | End: 2019-06-21 | Stop reason: SDUPTHER

## 2019-06-11 RX ORDER — IMIPRAMINE HYDROCHLORIDE 25 MG/1
TABLET ORAL
Qty: 1 EACH | Refills: 0 | Status: SHIPPED | OUTPATIENT
Start: 2019-06-11

## 2019-06-13 RX ORDER — TIZANIDINE 2 MG/1
TABLET ORAL
Qty: 120 TABLET | Refills: 6 | Status: SHIPPED | OUTPATIENT
Start: 2019-06-13 | End: 2019-12-05 | Stop reason: SDUPTHER

## 2019-06-14 ENCOUNTER — APPOINTMENT (OUTPATIENT)
Dept: GENERAL RADIOLOGY | Age: 64
End: 2019-06-14
Attending: EMERGENCY MEDICINE

## 2019-06-14 ENCOUNTER — OFFICE VISIT (OUTPATIENT)
Dept: CARDIOLOGY | Age: 64
End: 2019-06-14

## 2019-06-14 ENCOUNTER — OFFICE VISIT (OUTPATIENT)
Dept: CHIROPRACTIC MEDICINE | Age: 64
End: 2019-06-14

## 2019-06-14 ENCOUNTER — HOSPITAL ENCOUNTER (OUTPATIENT)
Age: 64
Setting detail: OBSERVATION
Discharge: HOME | End: 2019-06-17
Attending: EMERGENCY MEDICINE

## 2019-06-14 VITALS
DIASTOLIC BLOOD PRESSURE: 74 MMHG | WEIGHT: 254.41 LBS | SYSTOLIC BLOOD PRESSURE: 134 MMHG | BODY MASS INDEX: 43.67 KG/M2 | RESPIRATION RATE: 16 BRPM | HEART RATE: 94 BPM

## 2019-06-14 DIAGNOSIS — I10 ESSENTIAL HYPERTENSION WITH GOAL BLOOD PRESSURE LESS THAN 140/90: ICD-10-CM

## 2019-06-14 DIAGNOSIS — E78.00 PURE HYPERCHOLESTEROLEMIA: ICD-10-CM

## 2019-06-14 DIAGNOSIS — M54.50 CHRONIC BILATERAL LOW BACK PAIN WITHOUT SCIATICA: Primary | ICD-10-CM

## 2019-06-14 DIAGNOSIS — I47.11 INAPPROPRIATE SINUS TACHYCARDIA: ICD-10-CM

## 2019-06-14 DIAGNOSIS — M99.03 LUMBAR REGION SOMATIC DYSFUNCTION: ICD-10-CM

## 2019-06-14 DIAGNOSIS — M99.05 PELVIC SOMATIC DYSFUNCTION: ICD-10-CM

## 2019-06-14 DIAGNOSIS — R11.0 NAUSEA: ICD-10-CM

## 2019-06-14 DIAGNOSIS — I25.10 CORONARY ARTERY DISEASE INVOLVING NATIVE CORONARY ARTERY OF NATIVE HEART WITHOUT ANGINA PECTORIS: Primary | ICD-10-CM

## 2019-06-14 DIAGNOSIS — I06.2 AORTIC VALVE STENOSIS AND INSUFFICIENCY, RHEUMATIC: ICD-10-CM

## 2019-06-14 DIAGNOSIS — I34.0 MITRAL VALVE INSUFFICIENCY, UNSPECIFIED ETIOLOGY: ICD-10-CM

## 2019-06-14 DIAGNOSIS — I71.20 THORACIC AORTIC ANEURYSM WITHOUT RUPTURE (CMD): ICD-10-CM

## 2019-06-14 DIAGNOSIS — E87.6 HYPOKALEMIA: Primary | ICD-10-CM

## 2019-06-14 DIAGNOSIS — G89.29 CHRONIC BILATERAL LOW BACK PAIN WITHOUT SCIATICA: Primary | ICD-10-CM

## 2019-06-14 DIAGNOSIS — R79.89 ELEVATED TROPONIN: ICD-10-CM

## 2019-06-14 DIAGNOSIS — I20.1 PRINZMETAL ANGINA (CMD): ICD-10-CM

## 2019-06-14 DIAGNOSIS — M99.04 SACRAL REGION SOMATIC DYSFUNCTION: ICD-10-CM

## 2019-06-14 LAB
BASOPHILS # BLD AUTO: 0 K/MCL (ref 0–0.3)
BASOPHILS NFR BLD AUTO: 1 %
DIFFERENTIAL METHOD BLD: ABNORMAL
EOSINOPHIL # BLD AUTO: 0 K/MCL (ref 0.1–0.5)
EOSINOPHIL NFR SPEC: 1 %
ERYTHROCYTE [DISTWIDTH] IN BLOOD: 12.5 % (ref 11–15)
HCT VFR BLD CALC: 31.1 % (ref 36–46.5)
HGB BLD-MCNC: 10.9 G/DL (ref 12–15.5)
IMM GRANULOCYTES # BLD AUTO: 0 K/MCL (ref 0–0.2)
IMM GRANULOCYTES NFR BLD: 0 %
LYMPHOCYTES # BLD MANUAL: 1.3 K/MCL (ref 1–4)
LYMPHOCYTES NFR BLD MANUAL: 23 %
MCH RBC QN AUTO: 30.4 PG (ref 26–34)
MCHC RBC AUTO-ENTMCNC: 35 G/DL (ref 32–36.5)
MCV RBC AUTO: 86.9 FL (ref 78–100)
MONOCYTES # BLD MANUAL: 0.6 K/MCL (ref 0.3–0.9)
MONOCYTES NFR BLD MANUAL: 10 %
NEUTROPHILS # BLD: 3.8 K/MCL (ref 1.8–7.7)
NEUTROPHILS NFR BLD AUTO: 65 %
NRBC BLD MANUAL-RTO: 0 /100 WBC
PLATELET # BLD: 211 K/MCL (ref 140–450)
RBC # BLD: 3.58 MIL/MCL (ref 4–5.2)
TROPONIN I BLD-MCNC: <0.1 NG/ML
WBC # BLD: 5.8 K/MCL (ref 4.2–11)

## 2019-06-14 PROCEDURE — 84484 ASSAY OF TROPONIN QUANT: CPT

## 2019-06-14 PROCEDURE — 96374 THER/PROPH/DIAG INJ IV PUSH: CPT

## 2019-06-14 PROCEDURE — 98941 CHIROPRACT MANJ 3-4 REGIONS: CPT | Performed by: CHIROPRACTOR

## 2019-06-14 PROCEDURE — 85025 COMPLETE CBC W/AUTO DIFF WBC: CPT

## 2019-06-14 PROCEDURE — 83690 ASSAY OF LIPASE: CPT

## 2019-06-14 PROCEDURE — 99213 OFFICE O/P EST LOW 20 MIN: CPT | Performed by: INTERNAL MEDICINE

## 2019-06-14 PROCEDURE — 84443 ASSAY THYROID STIM HORMONE: CPT

## 2019-06-14 PROCEDURE — 96361 HYDRATE IV INFUSION ADD-ON: CPT

## 2019-06-14 PROCEDURE — 83735 ASSAY OF MAGNESIUM: CPT

## 2019-06-14 PROCEDURE — 71045 X-RAY EXAM CHEST 1 VIEW: CPT | Performed by: RADIOLOGY

## 2019-06-14 PROCEDURE — 84439 ASSAY OF FREE THYROXINE: CPT

## 2019-06-14 PROCEDURE — 93010 ELECTROCARDIOGRAM REPORT: CPT | Performed by: INTERNAL MEDICINE

## 2019-06-14 PROCEDURE — 36415 COLL VENOUS BLD VENIPUNCTURE: CPT

## 2019-06-14 PROCEDURE — 99285 EMERGENCY DEPT VISIT HI MDM: CPT

## 2019-06-14 PROCEDURE — 99285 EMERGENCY DEPT VISIT HI MDM: CPT | Performed by: EMERGENCY MEDICINE

## 2019-06-14 PROCEDURE — 71045 X-RAY EXAM CHEST 1 VIEW: CPT

## 2019-06-14 PROCEDURE — 80053 COMPREHEN METABOLIC PANEL: CPT

## 2019-06-14 PROCEDURE — 93005 ELECTROCARDIOGRAM TRACING: CPT | Performed by: EMERGENCY MEDICINE

## 2019-06-14 RX ORDER — ONDANSETRON 2 MG/ML
4 INJECTION INTRAMUSCULAR; INTRAVENOUS ONCE
Status: COMPLETED | OUTPATIENT
Start: 2019-06-14 | End: 2019-06-15

## 2019-06-14 RX ORDER — ISOSORBIDE DINITRATE 10 MG/1
10 TABLET ORAL 3 TIMES DAILY
Qty: 270 TABLET | Refills: 3 | Status: SHIPPED | OUTPATIENT
Start: 2019-06-14 | End: 2020-08-13 | Stop reason: SDUPTHER

## 2019-06-14 RX ORDER — NITROGLYCERIN 0.4 MG/1
0.4 TABLET SUBLINGUAL EVERY 5 MIN PRN
Qty: 25 TABLET | Refills: 0 | Status: SHIPPED | OUTPATIENT
Start: 2019-06-14

## 2019-06-14 ASSESSMENT — PAIN SCALES - GENERAL: PAINLEVEL_OUTOF10: 2

## 2019-06-15 ENCOUNTER — APPOINTMENT (OUTPATIENT)
Dept: CT IMAGING | Age: 64
End: 2019-06-15
Attending: EMERGENCY MEDICINE

## 2019-06-15 LAB
ALBUMIN SERPL-MCNC: 3.5 G/DL (ref 3.6–5.1)
ALBUMIN/GLOB SERPL: 1 {RATIO} (ref 1–2.4)
ALP SERPL-CCNC: 103 UNITS/L (ref 45–117)
ALT SERPL-CCNC: 31 UNITS/L
ANION GAP SERPL CALC-SCNC: 10 MMOL/L (ref 10–20)
ANION GAP SERPL CALC-SCNC: 6 MMOL/L (ref 10–20)
ANION GAP SERPL CALC-SCNC: 7 MMOL/L (ref 10–20)
APPEARANCE UR: NORMAL
AST SERPL-CCNC: 37 UNITS/L
ATRIAL RATE (BPM): 101
BILIRUB SERPL-MCNC: 0.4 MG/DL (ref 0.2–1)
BILIRUB UR QL STRIP: NEGATIVE
BUN SERPL-MCNC: 23 MG/DL (ref 6–20)
BUN SERPL-MCNC: 28 MG/DL (ref 6–20)
BUN SERPL-MCNC: 29 MG/DL (ref 6–20)
BUN/CREAT SERPL: 17 (ref 7–25)
BUN/CREAT SERPL: 18 (ref 7–25)
BUN/CREAT SERPL: 20 (ref 7–25)
CALCIUM SERPL-MCNC: 7.9 MG/DL (ref 8.4–10.2)
CALCIUM SERPL-MCNC: 8.5 MG/DL (ref 8.4–10.2)
CALCIUM SERPL-MCNC: 8.7 MG/DL (ref 8.4–10.2)
CHLORIDE SERPL-SCNC: 84 MMOL/L (ref 98–107)
CHLORIDE SERPL-SCNC: 86 MMOL/L (ref 98–107)
CHLORIDE SERPL-SCNC: 90 MMOL/L (ref 98–107)
CO2 SERPL-SCNC: 33 MMOL/L (ref 21–32)
CO2 SERPL-SCNC: 34 MMOL/L (ref 21–32)
CO2 SERPL-SCNC: 36 MMOL/L (ref 21–32)
COLOR UR: YELLOW
CREAT SERPL-MCNC: 1.3 MG/DL (ref 0.51–0.95)
CREAT SERPL-MCNC: 1.43 MG/DL (ref 0.51–0.95)
CREAT SERPL-MCNC: 1.68 MG/DL (ref 0.51–0.95)
ERYTHROCYTE [DISTWIDTH] IN BLOOD: 12.6 % (ref 11–15)
GLOBULIN SER-MCNC: 3.5 G/DL (ref 2–4)
GLUCOSE BLDC GLUCOMTR-MCNC: 135 MG/DL (ref 65–99)
GLUCOSE BLDC GLUCOMTR-MCNC: 154 MG/DL (ref 65–99)
GLUCOSE BLDC GLUCOMTR-MCNC: 168 MG/DL (ref 65–99)
GLUCOSE BLDC GLUCOMTR-MCNC: 97 MG/DL (ref 65–99)
GLUCOSE SERPL-MCNC: 100 MG/DL (ref 65–99)
GLUCOSE SERPL-MCNC: 107 MG/DL (ref 65–99)
GLUCOSE SERPL-MCNC: 142 MG/DL (ref 65–99)
GLUCOSE UR STRIP-MCNC: NEGATIVE MG/DL
HCT VFR BLD CALC: 31.1 % (ref 36–46.5)
HGB BLD-MCNC: 11 G/DL (ref 12–15.5)
HGB UR QL STRIP: NEGATIVE
KETONES UR STRIP-MCNC: NEGATIVE MG/DL
LEUKOCYTE ESTERASE UR QL STRIP: NEGATIVE
LIPASE SERPL-CCNC: 63 UNITS/L (ref 73–393)
MAGNESIUM SERPL-MCNC: 2.4 MG/DL (ref 1.7–2.4)
MAGNESIUM SERPL-MCNC: 2.4 MG/DL (ref 1.7–2.4)
MCH RBC QN AUTO: 30.2 PG (ref 26–34)
MCHC RBC AUTO-ENTMCNC: 35.4 G/DL (ref 32–36.5)
MCV RBC AUTO: 85.4 FL (ref 78–100)
NITRITE UR QL STRIP: NEGATIVE
NRBC BLD MANUAL-RTO: 0 /100 WBC
NT-PROBNP SERPL-MCNC: 182 PG/ML
P AXIS (DEGREES): 32
PH UR STRIP: 5 UNITS (ref 5–7)
PLATELET # BLD: 208 K/MCL (ref 140–450)
POTASSIUM SERPL-SCNC: 2.7 MMOL/L (ref 3.4–5.1)
POTASSIUM SERPL-SCNC: 2.8 MMOL/L (ref 3.4–5.1)
POTASSIUM SERPL-SCNC: 3.6 MMOL/L (ref 3.4–5.1)
POTASSIUM SERPL-SCNC: 4.5 MMOL/L (ref 3.4–5.1)
PR-INTERVAL (MSEC): 210
PROT SERPL-MCNC: 7 G/DL (ref 6.4–8.2)
PROT UR STRIP-MCNC: NEGATIVE MG/DL
QRS-INTERVAL (MSEC): 94
QT-INTERVAL (MSEC): 312
QTC: 404
R AXIS (DEGREES): 7
RBC # BLD: 3.64 MIL/MCL (ref 4–5.2)
REPORT TEXT: NORMAL
SODIUM SERPL-SCNC: 125 MMOL/L (ref 135–145)
SP GR UR STRIP: 1.01 (ref 1–1.03)
SPECIMEN SOURCE: NORMAL
T AXIS (DEGREES): 36
T4 FREE SERPL-MCNC: 1.1 NG/DL (ref 0.8–1.5)
TROPONIN I SERPL-MCNC: 0.05 NG/ML
TROPONIN I SERPL-MCNC: 0.06 NG/ML
TSH SERPL-ACNC: 7.62 MCUNITS/ML (ref 0.35–5)
UROBILINOGEN UR STRIP-MCNC: 0.2 MG/DL (ref 0–1)
VENTRICULAR RATE EKG/MIN (BPM): 101
WBC # BLD: 8.3 K/MCL (ref 4.2–11)

## 2019-06-15 PROCEDURE — 96376 TX/PRO/DX INJ SAME DRUG ADON: CPT

## 2019-06-15 PROCEDURE — 81003 URINALYSIS AUTO W/O SCOPE: CPT

## 2019-06-15 PROCEDURE — 10002803 HB RX 637: Performed by: HOSPITALIST

## 2019-06-15 PROCEDURE — 10004325 HB COUNTER ASSESSMENT EA 15 MIN

## 2019-06-15 PROCEDURE — 80048 BASIC METABOLIC PNL TOTAL CA: CPT

## 2019-06-15 PROCEDURE — 96374 THER/PROPH/DIAG INJ IV PUSH: CPT

## 2019-06-15 PROCEDURE — 10002800 HB RX 250 W HCPCS: Performed by: HOSPITALIST

## 2019-06-15 PROCEDURE — 96372 THER/PROPH/DIAG INJ SC/IM: CPT | Performed by: HOSPITALIST

## 2019-06-15 PROCEDURE — 82962 GLUCOSE BLOOD TEST: CPT

## 2019-06-15 PROCEDURE — 94640 AIRWAY INHALATION TREATMENT: CPT

## 2019-06-15 PROCEDURE — 10002807 HB RX 258: Performed by: HOSPITALIST

## 2019-06-15 PROCEDURE — 85027 COMPLETE CBC AUTOMATED: CPT

## 2019-06-15 PROCEDURE — 84484 ASSAY OF TROPONIN QUANT: CPT

## 2019-06-15 PROCEDURE — 10002803 HB RX 637: Performed by: EMERGENCY MEDICINE

## 2019-06-15 PROCEDURE — 10002800 HB RX 250 W HCPCS: Performed by: EMERGENCY MEDICINE

## 2019-06-15 PROCEDURE — 84132 ASSAY OF SERUM POTASSIUM: CPT

## 2019-06-15 PROCEDURE — 83880 ASSAY OF NATRIURETIC PEPTIDE: CPT

## 2019-06-15 PROCEDURE — G0378 HOSPITAL OBSERVATION PER HR: HCPCS

## 2019-06-15 PROCEDURE — 74176 CT ABD & PELVIS W/O CONTRAST: CPT

## 2019-06-15 PROCEDURE — 10004651 HB RX, NO CHARGE ITEM: Performed by: EMERGENCY MEDICINE

## 2019-06-15 PROCEDURE — 83735 ASSAY OF MAGNESIUM: CPT

## 2019-06-15 PROCEDURE — 74176 CT ABD & PELVIS W/O CONTRAST: CPT | Performed by: RADIOLOGY

## 2019-06-15 RX ORDER — TIZANIDINE 2 MG/1
2 TABLET ORAL 2 TIMES DAILY PRN
Status: DISCONTINUED | OUTPATIENT
Start: 2019-06-15 | End: 2019-06-17 | Stop reason: HOSPADM

## 2019-06-15 RX ORDER — GABAPENTIN 100 MG/1
100 CAPSULE ORAL 3 TIMES DAILY
Status: DISCONTINUED | OUTPATIENT
Start: 2019-06-15 | End: 2019-06-17 | Stop reason: HOSPADM

## 2019-06-15 RX ORDER — HYDROXYZINE HYDROCHLORIDE 25 MG/1
75 TABLET, FILM COATED ORAL EVERY EVENING
Status: DISCONTINUED | OUTPATIENT
Start: 2019-06-15 | End: 2019-06-17 | Stop reason: HOSPADM

## 2019-06-15 RX ORDER — POTASSIUM CHLORIDE 20 MEQ/1
40 TABLET, EXTENDED RELEASE ORAL ONCE
Status: COMPLETED | OUTPATIENT
Start: 2019-06-15 | End: 2019-06-15

## 2019-06-15 RX ORDER — DEXTROSE MONOHYDRATE 25 G/50ML
25 INJECTION, SOLUTION INTRAVENOUS PRN
Status: DISCONTINUED | OUTPATIENT
Start: 2019-06-15 | End: 2019-06-17 | Stop reason: HOSPADM

## 2019-06-15 RX ORDER — SODIUM CHLORIDE AND POTASSIUM CHLORIDE 150; 900 MG/100ML; MG/100ML
INJECTION, SOLUTION INTRAVENOUS CONTINUOUS
Status: DISCONTINUED | OUTPATIENT
Start: 2019-06-15 | End: 2019-06-15 | Stop reason: CLARIF

## 2019-06-15 RX ORDER — POLYETHYLENE GLYCOL 3350 17 G/17G
17 POWDER, FOR SOLUTION ORAL DAILY PRN
Status: DISCONTINUED | OUTPATIENT
Start: 2019-06-15 | End: 2019-06-17 | Stop reason: HOSPADM

## 2019-06-15 RX ORDER — ONDANSETRON 2 MG/ML
4 INJECTION INTRAMUSCULAR; INTRAVENOUS 2 TIMES DAILY PRN
Status: DISCONTINUED | OUTPATIENT
Start: 2019-06-15 | End: 2019-06-15 | Stop reason: SDUPTHER

## 2019-06-15 RX ORDER — POTASSIUM CHLORIDE 20 MEQ/1
20 TABLET, EXTENDED RELEASE ORAL 2 TIMES DAILY
Status: DISCONTINUED | OUTPATIENT
Start: 2019-06-15 | End: 2019-06-17 | Stop reason: HOSPADM

## 2019-06-15 RX ORDER — GABAPENTIN 300 MG/1
300 CAPSULE ORAL NIGHTLY
Status: DISCONTINUED | OUTPATIENT
Start: 2019-06-15 | End: 2019-06-17 | Stop reason: HOSPADM

## 2019-06-15 RX ORDER — BISACODYL 10 MG
10 SUPPOSITORY, RECTAL RECTAL DAILY PRN
Status: DISCONTINUED | OUTPATIENT
Start: 2019-06-15 | End: 2019-06-17 | Stop reason: HOSPADM

## 2019-06-15 RX ORDER — FLUTICASONE PROPIONATE AND SALMETEROL 500; 50 UG/1; UG/1
1 POWDER RESPIRATORY (INHALATION)
Status: DISCONTINUED | OUTPATIENT
Start: 2019-06-15 | End: 2019-06-17 | Stop reason: HOSPADM

## 2019-06-15 RX ORDER — POTASSIUM CHLORIDE 20 MEQ/1
20 TABLET, EXTENDED RELEASE ORAL EVERY 4 HOURS PRN
Status: DISCONTINUED | OUTPATIENT
Start: 2019-06-15 | End: 2019-06-17 | Stop reason: HOSPADM

## 2019-06-15 RX ORDER — ONDANSETRON 2 MG/ML
4 INJECTION INTRAMUSCULAR; INTRAVENOUS EVERY 12 HOURS PRN
Status: DISCONTINUED | OUTPATIENT
Start: 2019-06-15 | End: 2019-06-17 | Stop reason: HOSPADM

## 2019-06-15 RX ORDER — LOSARTAN POTASSIUM 25 MG/1
25 TABLET ORAL DAILY
Status: DISCONTINUED | OUTPATIENT
Start: 2019-06-15 | End: 2019-06-17 | Stop reason: HOSPADM

## 2019-06-15 RX ORDER — NICOTINE POLACRILEX 4 MG
15 LOZENGE BUCCAL PRN
Status: DISCONTINUED | OUTPATIENT
Start: 2019-06-15 | End: 2019-06-17 | Stop reason: HOSPADM

## 2019-06-15 RX ORDER — FAMOTIDINE 20 MG/1
20 TABLET, FILM COATED ORAL DAILY
Status: DISCONTINUED | OUTPATIENT
Start: 2019-06-15 | End: 2019-06-17 | Stop reason: HOSPADM

## 2019-06-15 RX ORDER — CLONAZEPAM 1 MG/1
1 TABLET ORAL NIGHTLY PRN
Status: DISCONTINUED | OUTPATIENT
Start: 2019-06-15 | End: 2019-06-17 | Stop reason: HOSPADM

## 2019-06-15 RX ORDER — PRAZOSIN HYDROCHLORIDE 1 MG/1
2 CAPSULE ORAL NIGHTLY
Status: DISCONTINUED | OUTPATIENT
Start: 2019-06-15 | End: 2019-06-17 | Stop reason: HOSPADM

## 2019-06-15 RX ORDER — ACETAMINOPHEN 325 MG/1
650 TABLET ORAL EVERY 4 HOURS PRN
Status: DISCONTINUED | OUTPATIENT
Start: 2019-06-15 | End: 2019-06-17 | Stop reason: HOSPADM

## 2019-06-15 RX ORDER — TRAMADOL HYDROCHLORIDE 50 MG/1
50 TABLET ORAL EVERY 6 HOURS PRN
Status: DISCONTINUED | OUTPATIENT
Start: 2019-06-15 | End: 2019-06-15 | Stop reason: ALTCHOICE

## 2019-06-15 RX ORDER — LEVOTHYROXINE SODIUM 0.05 MG/1
50 TABLET ORAL DAILY
Status: DISCONTINUED | OUTPATIENT
Start: 2019-06-15 | End: 2019-06-17 | Stop reason: HOSPADM

## 2019-06-15 RX ORDER — POTASSIUM CHLORIDE 14.9 MG/ML
20 INJECTION INTRAVENOUS EVERY 4 HOURS PRN
Status: DISCONTINUED | OUTPATIENT
Start: 2019-06-15 | End: 2019-06-17 | Stop reason: HOSPADM

## 2019-06-15 RX ORDER — PROCHLORPERAZINE MALEATE 5 MG/1
5 TABLET ORAL EVERY 4 HOURS PRN
Status: DISCONTINUED | OUTPATIENT
Start: 2019-06-15 | End: 2019-06-17 | Stop reason: HOSPADM

## 2019-06-15 RX ORDER — ENOXAPARIN SODIUM 100 MG/ML
40 INJECTION SUBCUTANEOUS DAILY
Status: DISCONTINUED | OUTPATIENT
Start: 2019-06-15 | End: 2019-06-17 | Stop reason: HOSPADM

## 2019-06-15 RX ORDER — MAGNESIUM SULFATE 1 G/100ML
1 INJECTION INTRAVENOUS DAILY PRN
Status: DISCONTINUED | OUTPATIENT
Start: 2019-06-15 | End: 2019-06-17 | Stop reason: HOSPADM

## 2019-06-15 RX ORDER — POLYETHYLENE GLYCOL 3350 17 G/17G
17 POWDER, FOR SOLUTION ORAL 2 TIMES DAILY
Status: DISCONTINUED | OUTPATIENT
Start: 2019-06-15 | End: 2019-06-17 | Stop reason: HOSPADM

## 2019-06-15 RX ORDER — POTASSIUM CHLORIDE 1.5 G/1.58G
40 POWDER, FOR SOLUTION ORAL EVERY 4 HOURS PRN
Status: DISCONTINUED | OUTPATIENT
Start: 2019-06-15 | End: 2019-06-17 | Stop reason: HOSPADM

## 2019-06-15 RX ORDER — DIPHENHYDRAMINE HCL 25 MG
50 CAPSULE ORAL NIGHTLY PRN
Status: DISCONTINUED | OUTPATIENT
Start: 2019-06-15 | End: 2019-06-17 | Stop reason: HOSPADM

## 2019-06-15 RX ORDER — CALCIUM CARBONATE 500 MG/1
500 TABLET, CHEWABLE ORAL
Status: DISCONTINUED | OUTPATIENT
Start: 2019-06-15 | End: 2019-06-17 | Stop reason: HOSPADM

## 2019-06-15 RX ORDER — PROCHLORPERAZINE EDISYLATE 5 MG/ML
5 INJECTION INTRAMUSCULAR; INTRAVENOUS EVERY 4 HOURS PRN
Status: DISCONTINUED | OUTPATIENT
Start: 2019-06-15 | End: 2019-06-17 | Stop reason: HOSPADM

## 2019-06-15 RX ORDER — SODIUM CHLORIDE AND POTASSIUM CHLORIDE 300; 900 MG/100ML; MG/100ML
INJECTION, SOLUTION INTRAVENOUS CONTINUOUS
Status: DISCONTINUED | OUTPATIENT
Start: 2019-06-15 | End: 2019-06-15

## 2019-06-15 RX ORDER — IPRATROPIUM BROMIDE AND ALBUTEROL SULFATE 2.5; .5 MG/3ML; MG/3ML
3 SOLUTION RESPIRATORY (INHALATION) EVERY 4 HOURS PRN
Status: DISCONTINUED | OUTPATIENT
Start: 2019-06-15 | End: 2019-06-17 | Stop reason: HOSPADM

## 2019-06-15 RX ORDER — BUTALBITAL, ACETAMINOPHEN AND CAFFEINE 50; 325; 40 MG/1; MG/1; MG/1
1 TABLET ORAL EVERY 4 HOURS PRN
Status: DISCONTINUED | OUTPATIENT
Start: 2019-06-15 | End: 2019-06-17 | Stop reason: HOSPADM

## 2019-06-15 RX ORDER — MAGNESIUM HYDROXIDE/ALUMINUM HYDROXICE/SIMETHICONE 120; 1200; 1200 MG/30ML; MG/30ML; MG/30ML
30 SUSPENSION ORAL EVERY 4 HOURS PRN
Status: DISCONTINUED | OUTPATIENT
Start: 2019-06-15 | End: 2019-06-17 | Stop reason: HOSPADM

## 2019-06-15 RX ORDER — PANTOPRAZOLE SODIUM 40 MG/1
40 TABLET, DELAYED RELEASE ORAL
Status: DISCONTINUED | OUTPATIENT
Start: 2019-06-15 | End: 2019-06-17 | Stop reason: HOSPADM

## 2019-06-15 RX ORDER — POTASSIUM CHLORIDE 20 MEQ/1
40 TABLET, EXTENDED RELEASE ORAL EVERY 4 HOURS PRN
Status: DISCONTINUED | OUTPATIENT
Start: 2019-06-15 | End: 2019-06-17 | Stop reason: HOSPADM

## 2019-06-15 RX ORDER — AMOXICILLIN 250 MG
2 CAPSULE ORAL DAILY PRN
Status: DISCONTINUED | OUTPATIENT
Start: 2019-06-15 | End: 2019-06-17 | Stop reason: HOSPADM

## 2019-06-15 RX ORDER — DULOXETIN HYDROCHLORIDE 30 MG/1
30 CAPSULE, DELAYED RELEASE ORAL EVERY MORNING
Status: DISCONTINUED | OUTPATIENT
Start: 2019-06-15 | End: 2019-06-17 | Stop reason: HOSPADM

## 2019-06-15 RX ORDER — TRIAMCINOLONE ACETONIDE 1 MG/G
1 CREAM TOPICAL 2 TIMES DAILY
Status: DISCONTINUED | OUTPATIENT
Start: 2019-06-15 | End: 2019-06-17 | Stop reason: HOSPADM

## 2019-06-15 RX ORDER — TOLTERODINE 2 MG/1
2 CAPSULE, EXTENDED RELEASE ORAL DAILY
Status: DISCONTINUED | OUTPATIENT
Start: 2019-06-15 | End: 2019-06-17 | Stop reason: HOSPADM

## 2019-06-15 RX ORDER — ISOSORBIDE DINITRATE 10 MG/1
10 TABLET ORAL 3 TIMES DAILY
Status: DISCONTINUED | OUTPATIENT
Start: 2019-06-15 | End: 2019-06-17 | Stop reason: HOSPADM

## 2019-06-15 RX ORDER — DEXTROSE MONOHYDRATE 50 MG/ML
INJECTION, SOLUTION INTRAVENOUS CONTINUOUS PRN
Status: DISCONTINUED | OUTPATIENT
Start: 2019-06-15 | End: 2019-06-17 | Stop reason: HOSPADM

## 2019-06-15 RX ORDER — FERROUS SULFATE 325(65) MG
650 TABLET ORAL DAILY
Status: DISCONTINUED | OUTPATIENT
Start: 2019-06-15 | End: 2019-06-17 | Stop reason: HOSPADM

## 2019-06-15 RX ORDER — POTASSIUM CHLORIDE 14.9 G/1000ML
40 INJECTION, SOLUTION INTRAVENOUS EVERY 4 HOURS PRN
Status: DISCONTINUED | OUTPATIENT
Start: 2019-06-15 | End: 2019-06-17 | Stop reason: HOSPADM

## 2019-06-15 RX ORDER — POTASSIUM CHLORIDE 1.5 G/1.58G
20 POWDER, FOR SOLUTION ORAL EVERY 4 HOURS PRN
Status: DISCONTINUED | OUTPATIENT
Start: 2019-06-15 | End: 2019-06-17 | Stop reason: HOSPADM

## 2019-06-15 RX ORDER — SODIUM CHLORIDE 9 MG/ML
INJECTION, SOLUTION INTRAVENOUS CONTINUOUS
Status: DISCONTINUED | OUTPATIENT
Start: 2019-06-15 | End: 2019-06-15

## 2019-06-15 RX ADMIN — ISOSORBIDE DINITRATE 10 MG: 10 TABLET ORAL at 08:17

## 2019-06-15 RX ADMIN — SODIUM CHLORIDE, PRESERVATIVE FREE 2 ML: 5 INJECTION INTRAVENOUS at 21:17

## 2019-06-15 RX ADMIN — GABAPENTIN 100 MG: 100 CAPSULE ORAL at 08:18

## 2019-06-15 RX ADMIN — ISOSORBIDE DINITRATE 10 MG: 10 TABLET ORAL at 13:42

## 2019-06-15 RX ADMIN — SODIUM CHLORIDE: 9 INJECTION, SOLUTION INTRAVENOUS at 03:03

## 2019-06-15 RX ADMIN — DULOXETINE HYDROCHLORIDE 30 MG: 30 CAPSULE, DELAYED RELEASE ORAL at 08:17

## 2019-06-15 RX ADMIN — CALCIUM CARBONATE (ANTACID) CHEW TAB 500 MG 500 MG: 500 CHEW TAB at 12:34

## 2019-06-15 RX ADMIN — LEVOTHYROXINE SODIUM 50 MCG: 50 TABLET ORAL at 08:18

## 2019-06-15 RX ADMIN — PANTOPRAZOLE SODIUM 40 MG: 40 TABLET, DELAYED RELEASE ORAL at 08:18

## 2019-06-15 RX ADMIN — GABAPENTIN 100 MG: 100 CAPSULE ORAL at 17:42

## 2019-06-15 RX ADMIN — POTASSIUM CHLORIDE 40 MEQ: 1500 TABLET, EXTENDED RELEASE ORAL at 02:18

## 2019-06-15 RX ADMIN — POLYETHYLENE GLYCOL (3350) 17 G: 17 POWDER, FOR SOLUTION ORAL at 13:42

## 2019-06-15 RX ADMIN — TOLTERODINE TARTRATE 2 MG: 2 CAPSULE, EXTENDED RELEASE ORAL at 08:18

## 2019-06-15 RX ADMIN — CALCIUM CARBONATE (ANTACID) CHEW TAB 500 MG 500 MG: 500 CHEW TAB at 08:17

## 2019-06-15 RX ADMIN — FLUTICASONE PROPIONATE AND SALMETEROL 1 PUFF: 50; 500 POWDER RESPIRATORY (INHALATION) at 19:08

## 2019-06-15 RX ADMIN — GABAPENTIN 100 MG: 100 CAPSULE ORAL at 13:42

## 2019-06-15 RX ADMIN — POTASSIUM CHLORIDE AND SODIUM CHLORIDE 100 ML/HR: 900; 150 INJECTION, SOLUTION INTRAVENOUS at 09:44

## 2019-06-15 RX ADMIN — POTASSIUM CHLORIDE 20 MEQ: 1500 TABLET, EXTENDED RELEASE ORAL at 17:43

## 2019-06-15 RX ADMIN — ACETAMINOPHEN AND CODEINE PHOSPHATE 2 TABLET: 300; 30 TABLET ORAL at 15:52

## 2019-06-15 RX ADMIN — FAMOTIDINE 20 MG: 20 TABLET ORAL at 21:14

## 2019-06-15 RX ADMIN — ONDANSETRON 4 MG: 2 INJECTION INTRAMUSCULAR; INTRAVENOUS at 09:41

## 2019-06-15 RX ADMIN — POLYETHYLENE GLYCOL 3350 17 G: 17 POWDER, FOR SOLUTION ORAL at 12:34

## 2019-06-15 RX ADMIN — ENOXAPARIN SODIUM 40 MG: 40 INJECTION SUBCUTANEOUS at 08:18

## 2019-06-15 RX ADMIN — POTASSIUM CHLORIDE 40 MEQ: 1500 TABLET, EXTENDED RELEASE ORAL at 09:06

## 2019-06-15 RX ADMIN — FLUTICASONE PROPIONATE AND SALMETEROL 1 PUFF: 50; 500 POWDER RESPIRATORY (INHALATION) at 09:10

## 2019-06-15 RX ADMIN — ONDANSETRON 4 MG: 2 INJECTION INTRAMUSCULAR; INTRAVENOUS at 00:02

## 2019-06-15 RX ADMIN — ISOSORBIDE DINITRATE 10 MG: 10 TABLET ORAL at 21:15

## 2019-06-15 RX ADMIN — GABAPENTIN 300 MG: 300 CAPSULE ORAL at 21:15

## 2019-06-15 RX ADMIN — CALCIUM CARBONATE (ANTACID) CHEW TAB 500 MG 500 MG: 500 CHEW TAB at 17:43

## 2019-06-15 RX ADMIN — LOSARTAN POTASSIUM 25 MG: 25 TABLET, FILM COATED ORAL at 08:18

## 2019-06-15 RX ADMIN — POTASSIUM CHLORIDE 20 MEQ: 1500 TABLET, EXTENDED RELEASE ORAL at 12:34

## 2019-06-15 RX ADMIN — POTASSIUM CHLORIDE AND SODIUM CHLORIDE 200 ML/HR: 900; 300 INJECTION, SOLUTION INTRAVENOUS at 00:48

## 2019-06-15 RX ADMIN — HYDROXYZINE HYDROCHLORIDE 75 MG: 25 TABLET ORAL at 21:15

## 2019-06-15 RX ADMIN — PHENYTOIN 2 MG: 125 SUSPENSION ORAL at 21:15

## 2019-06-15 RX ADMIN — TIZANIDINE 2 MG: 2 TABLET ORAL at 21:22

## 2019-06-15 RX ADMIN — INSULIN LISPRO 2 UNITS: 100 INJECTION, SOLUTION INTRAVENOUS; SUBCUTANEOUS at 09:07

## 2019-06-15 RX ADMIN — FERROUS SULFATE TAB 325 MG (65 MG ELEMENTAL FE) 650 MG: 325 (65 FE) TAB at 12:33

## 2019-06-15 SDOH — HEALTH STABILITY: MENTAL HEALTH: HOW OFTEN DO YOU HAVE A DRINK CONTAINING ALCOHOL?: NEVER

## 2019-06-15 ASSESSMENT — COGNITIVE AND FUNCTIONAL STATUS - GENERAL
ARE YOU DEAF OR DO YOU HAVE SERIOUS DIFFICULTY  HEARING: NO
DO YOU HAVE DIFFICULTY DRESSING OR BATHING: NO
ARE YOU BLIND OR DO YOU HAVE SERIOUS DIFFICULTY SEEING, EVEN WHEN WEARING GLASSES: NO
BECAUSE OF A PHYSICAL, MENTAL, OR EMOTIONAL CONDITION, DO YOU HAVE SERIOUS DIFFICULTY CONCENTRATING, REMEMBERING OR MAKING DECISIONS: NO
BECAUSE OF A PHYSICAL, MENTAL, OR EMOTIONAL CONDITION, DO YOU HAVE DIFFICULTY DOING ERRANDS ALONE: NO
DO YOU HAVE SERIOUS DIFFICULTY WALKING OR CLIMBING STAIRS: NO

## 2019-06-15 ASSESSMENT — ACTIVITIES OF DAILY LIVING (ADL)
CHRONIC_PAIN_PRESENT: YES, CHRONIC
RECENT_DECLINE_ADL: NO
ADL_SHORT_OF_BREATH: NO
DESCRIBE HOW PAIN IMPACTS YOUR LIFE: NONE/CAN MANAGE
ADL_BEFORE_ADMISSION: INDEPENDENT
ADL_SCORE: 12

## 2019-06-15 ASSESSMENT — LIFESTYLE VARIABLES
HOW OFTEN DO YOU HAVE A DRINK CONTAINING ALCOHOL: NEVER
HOW OFTEN DO YOU HAVE 6 OR MORE DRINKS ON ONE OCCASION: NEVER
HOW MANY STANDARD DRINKS CONTAINING ALCOHOL DO YOU HAVE ON A TYPICAL DAY: 0,1 OR 2
AUDIT-C TOTAL SCORE: 0
ALCOHOL_USE_STATUS: NO OR LOW RISK WITH VALIDATED TOOL

## 2019-06-15 ASSESSMENT — PAIN SCALES - GENERAL
PAIN_LEVEL_AT_REST: 5
PAIN_LEVEL_AT_REST: 0
PAIN_LEVEL_AT_REST: 0
PAIN_LEVEL_AT_REST: 5
PAIN_LEVEL_AT_REST: 0
PAIN_LEVEL_AT_REST: 0

## 2019-06-16 LAB
ANION GAP SERPL CALC-SCNC: 8 MMOL/L (ref 10–20)
BUN SERPL-MCNC: 23 MG/DL (ref 6–20)
BUN/CREAT SERPL: 19 (ref 7–25)
CALCIUM SERPL-MCNC: 8.4 MG/DL (ref 8.4–10.2)
CHLORIDE SERPL-SCNC: 92 MMOL/L (ref 98–107)
CO2 SERPL-SCNC: 34 MMOL/L (ref 21–32)
CREAT ?TM UR-MCNC: 27.33 MG/DL
CREAT SERPL-MCNC: 1.21 MG/DL (ref 0.51–0.95)
CROSSMATCH EXPIRE: NORMAL
GLUCOSE BLDC GLUCOMTR-MCNC: 109 MG/DL (ref 65–99)
GLUCOSE BLDC GLUCOMTR-MCNC: 132 MG/DL (ref 65–99)
GLUCOSE BLDC GLUCOMTR-MCNC: 157 MG/DL (ref 65–99)
GLUCOSE BLDC GLUCOMTR-MCNC: 201 MG/DL (ref 65–99)
GLUCOSE SERPL-MCNC: 126 MG/DL (ref 65–99)
MAGNESIUM SERPL-MCNC: 2.2 MG/DL (ref 1.7–2.4)
POTASSIUM SERPL-SCNC: 3.4 MMOL/L (ref 3.4–5.1)
POTASSIUM SERPL-SCNC: 4 MMOL/L (ref 3.4–5.1)
SODIUM ?TM UR-SCNC: 28 MMOL/L
SODIUM SERPL-SCNC: 131 MMOL/L (ref 135–145)

## 2019-06-16 PROCEDURE — 10002803 HB RX 637: Performed by: HOSPITALIST

## 2019-06-16 PROCEDURE — G0378 HOSPITAL OBSERVATION PER HR: HCPCS

## 2019-06-16 PROCEDURE — 84132 ASSAY OF SERUM POTASSIUM: CPT

## 2019-06-16 PROCEDURE — 82570 ASSAY OF URINE CREATININE: CPT

## 2019-06-16 PROCEDURE — 83735 ASSAY OF MAGNESIUM: CPT

## 2019-06-16 PROCEDURE — 96372 THER/PROPH/DIAG INJ SC/IM: CPT | Performed by: HOSPITALIST

## 2019-06-16 PROCEDURE — 80048 BASIC METABOLIC PNL TOTAL CA: CPT

## 2019-06-16 PROCEDURE — 94640 AIRWAY INHALATION TREATMENT: CPT

## 2019-06-16 PROCEDURE — 10004281 HB COUNTER-STAFF TIME PER 15 MIN

## 2019-06-16 PROCEDURE — 82962 GLUCOSE BLOOD TEST: CPT

## 2019-06-16 PROCEDURE — 10002800 HB RX 250 W HCPCS: Performed by: HOSPITALIST

## 2019-06-16 PROCEDURE — 99225 SUBSEQUENT OBSERVATION CARE LEVEL II: CPT | Performed by: HOSPITALIST

## 2019-06-16 PROCEDURE — 84300 ASSAY OF URINE SODIUM: CPT

## 2019-06-16 RX ORDER — FUROSEMIDE 80 MG
80 TABLET ORAL
Status: DISCONTINUED | OUTPATIENT
Start: 2019-06-16 | End: 2019-06-17 | Stop reason: HOSPADM

## 2019-06-16 RX ADMIN — GABAPENTIN 100 MG: 100 CAPSULE ORAL at 17:34

## 2019-06-16 RX ADMIN — INSULIN LISPRO 2 UNITS: 100 INJECTION, SOLUTION INTRAVENOUS; SUBCUTANEOUS at 08:22

## 2019-06-16 RX ADMIN — CALCIUM CARBONATE (ANTACID) CHEW TAB 500 MG 500 MG: 500 CHEW TAB at 12:39

## 2019-06-16 RX ADMIN — POLYETHYLENE GLYCOL (3350) 17 G: 17 POWDER, FOR SOLUTION ORAL at 08:25

## 2019-06-16 RX ADMIN — ISOSORBIDE DINITRATE 10 MG: 10 TABLET ORAL at 08:19

## 2019-06-16 RX ADMIN — PHENYTOIN 2 MG: 125 SUSPENSION ORAL at 20:33

## 2019-06-16 RX ADMIN — PANTOPRAZOLE SODIUM 40 MG: 40 TABLET, DELAYED RELEASE ORAL at 08:19

## 2019-06-16 RX ADMIN — GUAIFENESIN AND DEXTROMETHORPHAN HYDROBROMIDE 2 TABLET: 600; 30 TABLET, EXTENDED RELEASE ORAL at 20:29

## 2019-06-16 RX ADMIN — GABAPENTIN 100 MG: 100 CAPSULE ORAL at 12:39

## 2019-06-16 RX ADMIN — ISOSORBIDE DINITRATE 10 MG: 10 TABLET ORAL at 12:45

## 2019-06-16 RX ADMIN — FERROUS SULFATE TAB 325 MG (65 MG ELEMENTAL FE) 650 MG: 325 (65 FE) TAB at 08:19

## 2019-06-16 RX ADMIN — DULOXETINE HYDROCHLORIDE 30 MG: 30 CAPSULE, DELAYED RELEASE ORAL at 08:19

## 2019-06-16 RX ADMIN — POTASSIUM CHLORIDE 20 MEQ: 1500 TABLET, EXTENDED RELEASE ORAL at 17:34

## 2019-06-16 RX ADMIN — GABAPENTIN 100 MG: 100 CAPSULE ORAL at 08:20

## 2019-06-16 RX ADMIN — ENOXAPARIN SODIUM 40 MG: 40 INJECTION SUBCUTANEOUS at 08:20

## 2019-06-16 RX ADMIN — POLYETHYLENE GLYCOL 3350 17 G: 17 POWDER, FOR SOLUTION ORAL at 08:20

## 2019-06-16 RX ADMIN — GUAIFENESIN AND DEXTROMETHORPHAN HYDROBROMIDE 2 TABLET: 600; 30 TABLET, EXTENDED RELEASE ORAL at 13:42

## 2019-06-16 RX ADMIN — LEVOTHYROXINE SODIUM 50 MCG: 50 TABLET ORAL at 08:19

## 2019-06-16 RX ADMIN — ACETAMINOPHEN AND CODEINE PHOSPHATE 2 TABLET: 300; 30 TABLET ORAL at 13:42

## 2019-06-16 RX ADMIN — HYDROXYZINE HYDROCHLORIDE 75 MG: 25 TABLET ORAL at 20:30

## 2019-06-16 RX ADMIN — POTASSIUM CHLORIDE 20 MEQ: 1500 TABLET, EXTENDED RELEASE ORAL at 08:18

## 2019-06-16 RX ADMIN — POTASSIUM CHLORIDE 20 MEQ: 1500 TABLET, EXTENDED RELEASE ORAL at 08:19

## 2019-06-16 RX ADMIN — CALCIUM CARBONATE (ANTACID) CHEW TAB 500 MG 500 MG: 500 CHEW TAB at 08:19

## 2019-06-16 RX ADMIN — FLUTICASONE PROPIONATE AND SALMETEROL 1 PUFF: 50; 500 POWDER RESPIRATORY (INHALATION) at 19:11

## 2019-06-16 RX ADMIN — FAMOTIDINE 20 MG: 20 TABLET ORAL at 20:30

## 2019-06-16 RX ADMIN — CLONAZEPAM 1 MG: 1 TABLET ORAL at 23:08

## 2019-06-16 RX ADMIN — GABAPENTIN 300 MG: 300 CAPSULE ORAL at 20:30

## 2019-06-16 RX ADMIN — CALCIUM CARBONATE (ANTACID) CHEW TAB 500 MG 500 MG: 500 CHEW TAB at 17:34

## 2019-06-16 RX ADMIN — FUROSEMIDE 80 MG: 80 TABLET ORAL at 12:46

## 2019-06-16 RX ADMIN — ISOSORBIDE DINITRATE 10 MG: 10 TABLET ORAL at 20:30

## 2019-06-16 RX ADMIN — LOSARTAN POTASSIUM 25 MG: 25 TABLET, FILM COATED ORAL at 08:19

## 2019-06-16 RX ADMIN — TOLTERODINE TARTRATE 2 MG: 2 CAPSULE, EXTENDED RELEASE ORAL at 08:20

## 2019-06-16 RX ADMIN — FLUTICASONE PROPIONATE AND SALMETEROL 1 PUFF: 50; 500 POWDER RESPIRATORY (INHALATION) at 07:29

## 2019-06-16 RX ADMIN — TIZANIDINE 2 MG: 2 TABLET ORAL at 20:30

## 2019-06-16 ASSESSMENT — PAIN SCALES - GENERAL
PAIN_LEVEL_AT_REST: 0
PAIN_LEVEL_AT_REST: 6
PAIN_LEVEL_AT_REST: 0
PAIN_LEVEL_WITH_ACTIVITY: 0
PAIN_LEVEL_AT_REST: 2
PAIN_LEVEL_AT_REST: 0

## 2019-06-17 ENCOUNTER — E-ADVICE (OUTPATIENT)
Dept: INTERNAL MEDICINE | Age: 64
End: 2019-06-17

## 2019-06-17 ENCOUNTER — APPOINTMENT (OUTPATIENT)
Dept: PULMONOLOGY | Age: 64
End: 2019-06-17

## 2019-06-17 VITALS
DIASTOLIC BLOOD PRESSURE: 81 MMHG | TEMPERATURE: 98 F | WEIGHT: 257 LBS | BODY MASS INDEX: 42.82 KG/M2 | HEIGHT: 65 IN | HEART RATE: 71 BPM | OXYGEN SATURATION: 93 % | SYSTOLIC BLOOD PRESSURE: 127 MMHG | RESPIRATION RATE: 18 BRPM

## 2019-06-17 LAB
ANION GAP SERPL CALC-SCNC: 8 MMOL/L (ref 10–20)
BUN SERPL-MCNC: 25 MG/DL (ref 6–20)
BUN/CREAT SERPL: 19 (ref 7–25)
CALCIUM SERPL-MCNC: 9 MG/DL (ref 8.4–10.2)
CHLORIDE SERPL-SCNC: 96 MMOL/L (ref 98–107)
CO2 SERPL-SCNC: 35 MMOL/L (ref 21–32)
CREAT SERPL-MCNC: 1.32 MG/DL (ref 0.51–0.95)
CROSSMATCH EXPIRE: NORMAL
ERYTHROCYTE [DISTWIDTH] IN BLOOD: 12.9 % (ref 11–15)
GLUCOSE BLDC GLUCOMTR-MCNC: 143 MG/DL (ref 65–99)
GLUCOSE SERPL-MCNC: 136 MG/DL (ref 65–99)
HCT VFR BLD CALC: 29.9 % (ref 36–46.5)
HGB BLD-MCNC: 10.4 G/DL (ref 12–15.5)
MCH RBC QN AUTO: 30.8 PG (ref 26–34)
MCHC RBC AUTO-ENTMCNC: 34.8 G/DL (ref 32–36.5)
MCV RBC AUTO: 88.5 FL (ref 78–100)
NRBC BLD MANUAL-RTO: 0 /100 WBC
NT-PROBNP SERPL-MCNC: 234 PG/ML
PLATELET # BLD: 223 K/MCL (ref 140–450)
POTASSIUM SERPL-SCNC: 3.6 MMOL/L (ref 3.4–5.1)
RBC # BLD: 3.38 MIL/MCL (ref 4–5.2)
SODIUM SERPL-SCNC: 135 MMOL/L (ref 135–145)
WBC # BLD: 3.7 K/MCL (ref 4.2–11)

## 2019-06-17 PROCEDURE — 10002800 HB RX 250 W HCPCS: Performed by: HOSPITALIST

## 2019-06-17 PROCEDURE — 10004651 HB RX, NO CHARGE ITEM: Performed by: EMERGENCY MEDICINE

## 2019-06-17 PROCEDURE — G0378 HOSPITAL OBSERVATION PER HR: HCPCS

## 2019-06-17 PROCEDURE — 96372 THER/PROPH/DIAG INJ SC/IM: CPT | Performed by: HOSPITALIST

## 2019-06-17 PROCEDURE — 80048 BASIC METABOLIC PNL TOTAL CA: CPT

## 2019-06-17 PROCEDURE — 10002803 HB RX 637: Performed by: HOSPITALIST

## 2019-06-17 PROCEDURE — 99217 OBSERVATION CARE DISCHARGE: CPT | Performed by: HOSPITALIST

## 2019-06-17 PROCEDURE — 83880 ASSAY OF NATRIURETIC PEPTIDE: CPT

## 2019-06-17 PROCEDURE — 85027 COMPLETE CBC AUTOMATED: CPT

## 2019-06-17 PROCEDURE — 94640 AIRWAY INHALATION TREATMENT: CPT

## 2019-06-17 PROCEDURE — 82962 GLUCOSE BLOOD TEST: CPT

## 2019-06-17 RX ORDER — HEPARIN SODIUM,PORCINE/PF 10 UNIT/ML
50 SYRINGE (ML) INTRAVENOUS PRN
Status: DISCONTINUED | OUTPATIENT
Start: 2019-06-17 | End: 2019-06-17 | Stop reason: HOSPADM

## 2019-06-17 RX ADMIN — PANTOPRAZOLE SODIUM 40 MG: 40 TABLET, DELAYED RELEASE ORAL at 06:40

## 2019-06-17 RX ADMIN — FLUTICASONE PROPIONATE AND SALMETEROL 1 PUFF: 50; 500 POWDER RESPIRATORY (INHALATION) at 07:24

## 2019-06-17 RX ADMIN — SODIUM CHLORIDE, PRESERVATIVE FREE 50 UNITS: 5 INJECTION INTRAVENOUS at 10:55

## 2019-06-17 RX ADMIN — POTASSIUM CHLORIDE 20 MEQ: 1500 TABLET, EXTENDED RELEASE ORAL at 08:38

## 2019-06-17 RX ADMIN — DULOXETINE HYDROCHLORIDE 30 MG: 30 CAPSULE, DELAYED RELEASE ORAL at 08:38

## 2019-06-17 RX ADMIN — LEVOTHYROXINE SODIUM 50 MCG: 50 TABLET ORAL at 08:39

## 2019-06-17 RX ADMIN — CALCIUM CARBONATE (ANTACID) CHEW TAB 500 MG 500 MG: 500 CHEW TAB at 08:39

## 2019-06-17 RX ADMIN — LOSARTAN POTASSIUM 25 MG: 25 TABLET, FILM COATED ORAL at 08:38

## 2019-06-17 RX ADMIN — ISOSORBIDE DINITRATE 10 MG: 10 TABLET ORAL at 08:38

## 2019-06-17 RX ADMIN — GABAPENTIN 100 MG: 100 CAPSULE ORAL at 08:38

## 2019-06-17 RX ADMIN — FERROUS SULFATE TAB 325 MG (65 MG ELEMENTAL FE) 650 MG: 325 (65 FE) TAB at 08:39

## 2019-06-17 RX ADMIN — SODIUM CHLORIDE, PRESERVATIVE FREE 2 ML: 5 INJECTION INTRAVENOUS at 08:40

## 2019-06-17 RX ADMIN — ENOXAPARIN SODIUM 40 MG: 40 INJECTION SUBCUTANEOUS at 08:38

## 2019-06-17 RX ADMIN — TOLTERODINE TARTRATE 2 MG: 2 CAPSULE, EXTENDED RELEASE ORAL at 08:44

## 2019-06-17 RX ADMIN — GUAIFENESIN AND DEXTROMETHORPHAN HYDROBROMIDE 2 TABLET: 600; 30 TABLET, EXTENDED RELEASE ORAL at 08:38

## 2019-06-17 ASSESSMENT — PAIN SCALES - GENERAL
PAIN_LEVEL_WITH_ACTIVITY: 0
PAIN_LEVEL_AT_REST: 0

## 2019-06-18 ENCOUNTER — TELEPHONE (OUTPATIENT)
Dept: INTERNAL MEDICINE | Age: 64
End: 2019-06-18

## 2019-06-18 DIAGNOSIS — D64.9 ANEMIA, UNSPECIFIED TYPE: ICD-10-CM

## 2019-06-18 DIAGNOSIS — E87.6 HYPOKALEMIA: Primary | ICD-10-CM

## 2019-06-18 RX ORDER — BUTALBITAL, ACETAMINOPHEN AND CAFFEINE 50; 325; 40 MG/1; MG/1; MG/1
1 TABLET ORAL EVERY 4 HOURS PRN
Qty: 30 TABLET | Refills: 0 | Status: SHIPPED | OUTPATIENT
Start: 2019-06-18 | End: 2019-10-02 | Stop reason: SDUPTHER

## 2019-06-19 ENCOUNTER — OFFICE VISIT (OUTPATIENT)
Dept: CHIROPRACTIC MEDICINE | Age: 64
End: 2019-06-19

## 2019-06-19 DIAGNOSIS — G89.29 CHRONIC BILATERAL LOW BACK PAIN WITHOUT SCIATICA: Primary | ICD-10-CM

## 2019-06-19 DIAGNOSIS — M99.04 SACRAL REGION SOMATIC DYSFUNCTION: ICD-10-CM

## 2019-06-19 DIAGNOSIS — M99.01 SEGMENTAL AND SOMATIC DYSFUNCTION OF CERVICAL REGION: ICD-10-CM

## 2019-06-19 DIAGNOSIS — M54.2 CERVICALGIA: ICD-10-CM

## 2019-06-19 DIAGNOSIS — M99.05 PELVIC SOMATIC DYSFUNCTION: ICD-10-CM

## 2019-06-19 DIAGNOSIS — M99.03 LUMBAR REGION SOMATIC DYSFUNCTION: ICD-10-CM

## 2019-06-19 DIAGNOSIS — M54.50 CHRONIC BILATERAL LOW BACK PAIN WITHOUT SCIATICA: Primary | ICD-10-CM

## 2019-06-19 PROCEDURE — 98941 CHIROPRACT MANJ 3-4 REGIONS: CPT | Performed by: CHIROPRACTOR

## 2019-06-20 ENCOUNTER — TELEPHONE (OUTPATIENT)
Dept: NEUROLOGY | Age: 64
End: 2019-06-20

## 2019-06-21 ENCOUNTER — LAB SERVICES (OUTPATIENT)
Dept: LAB | Age: 64
End: 2019-06-21

## 2019-06-21 ENCOUNTER — OFFICE VISIT (OUTPATIENT)
Dept: INTERNAL MEDICINE | Age: 64
End: 2019-06-21

## 2019-06-21 ENCOUNTER — TELEPHONE (OUTPATIENT)
Dept: INTERNAL MEDICINE | Age: 64
End: 2019-06-21

## 2019-06-21 VITALS
SYSTOLIC BLOOD PRESSURE: 110 MMHG | OXYGEN SATURATION: 93 % | WEIGHT: 256 LBS | RESPIRATION RATE: 16 BRPM | DIASTOLIC BLOOD PRESSURE: 60 MMHG | HEART RATE: 110 BPM | BODY MASS INDEX: 42.6 KG/M2

## 2019-06-21 DIAGNOSIS — E87.6 HYPOKALEMIA: ICD-10-CM

## 2019-06-21 DIAGNOSIS — E87.1 HYPONATREMIA: Primary | ICD-10-CM

## 2019-06-21 DIAGNOSIS — I10 ESSENTIAL HYPERTENSION WITH GOAL BLOOD PRESSURE LESS THAN 140/90: ICD-10-CM

## 2019-06-21 DIAGNOSIS — E11.9 TYPE 2 DIABETES MELLITUS WITHOUT COMPLICATION, WITHOUT LONG-TERM CURRENT USE OF INSULIN (CMD): ICD-10-CM

## 2019-06-21 DIAGNOSIS — D64.9 ANEMIA, UNSPECIFIED TYPE: ICD-10-CM

## 2019-06-21 DIAGNOSIS — E86.1 HYPOVOLEMIA: ICD-10-CM

## 2019-06-21 DIAGNOSIS — M79.7 FIBROMYALGIA SYNDROME: ICD-10-CM

## 2019-06-21 DIAGNOSIS — N18.30 CKD (CHRONIC KIDNEY DISEASE), STAGE III (CMD): ICD-10-CM

## 2019-06-21 DIAGNOSIS — Z78.0 MENOPAUSE: ICD-10-CM

## 2019-06-21 LAB
ANION GAP SERPL CALC-SCNC: 11 MMOL/L (ref 10–20)
BASOPHILS # BLD AUTO: 0 K/MCL (ref 0–0.3)
BASOPHILS NFR BLD AUTO: 1 %
BUN SERPL-MCNC: 24 MG/DL (ref 6–20)
BUN/CREAT SERPL: 16 (ref 7–25)
CALCIUM SERPL-MCNC: 9.4 MG/DL (ref 8.4–10.2)
CHLORIDE SERPL-SCNC: 98 MMOL/L (ref 98–107)
CO2 SERPL-SCNC: 32 MMOL/L (ref 21–32)
CREAT SERPL-MCNC: 1.47 MG/DL (ref 0.51–0.95)
DIFFERENTIAL METHOD BLD: ABNORMAL
EOSINOPHIL # BLD AUTO: 0.1 K/MCL (ref 0.1–0.5)
EOSINOPHIL NFR SPEC: 1 %
ERYTHROCYTE [DISTWIDTH] IN BLOOD: 13.5 % (ref 11–15)
FASTING STATUS PATIENT QL REPORTED: 1 HRS
GLUCOSE SERPL-MCNC: 124 MG/DL (ref 65–99)
HCT VFR BLD CALC: 32.7 % (ref 36–46.5)
HGB BLD-MCNC: 10.8 G/DL (ref 12–15.5)
LYMPHOCYTES # BLD MANUAL: 1.3 K/MCL (ref 1–4)
LYMPHOCYTES NFR BLD MANUAL: 20 %
MCH RBC QN AUTO: 30 PG (ref 26–34)
MCHC RBC AUTO-ENTMCNC: 33 G/DL (ref 32–36.5)
MCV RBC AUTO: 90.8 FL (ref 78–100)
MONOCYTES # BLD MANUAL: 0.6 K/MCL (ref 0.3–0.9)
MONOCYTES NFR BLD MANUAL: 9 %
NEUTROPHILS # BLD: 4.3 K/MCL (ref 1.8–7.7)
NEUTROPHILS NFR BLD AUTO: 69 %
PLATELET # BLD: 301 K/MCL (ref 140–450)
POTASSIUM SERPL-SCNC: 4.4 MMOL/L (ref 3.4–5.1)
RBC # BLD: 3.6 MIL/MCL (ref 4–5.2)
SODIUM SERPL-SCNC: 137 MMOL/L (ref 135–145)
WBC # BLD: 6.2 K/MCL (ref 4.2–11)

## 2019-06-21 PROCEDURE — 80048 BASIC METABOLIC PNL TOTAL CA: CPT | Performed by: INTERNAL MEDICINE

## 2019-06-21 PROCEDURE — 36415 COLL VENOUS BLD VENIPUNCTURE: CPT | Performed by: INTERNAL MEDICINE

## 2019-06-21 PROCEDURE — 99214 OFFICE O/P EST MOD 30 MIN: CPT | Performed by: INTERNAL MEDICINE

## 2019-06-21 PROCEDURE — 85025 COMPLETE CBC W/AUTO DIFF WBC: CPT | Performed by: INTERNAL MEDICINE

## 2019-06-21 RX ORDER — GUAIFENESIN AND DEXTROMETHORPHAN HYDROBROMIDE 1200; 60 MG/1; MG/1
1 TABLET, EXTENDED RELEASE ORAL 2 TIMES DAILY
Status: SHIPPED | COMMUNITY
Start: 2019-06-21

## 2019-06-21 RX ORDER — FUROSEMIDE 40 MG/1
80 TABLET ORAL DAILY
Qty: 270 TABLET | Refills: 3 | Status: SHIPPED | OUTPATIENT
Start: 2019-06-21 | End: 2019-09-13 | Stop reason: SDUPTHER

## 2019-06-24 ENCOUNTER — OFFICE VISIT (OUTPATIENT)
Dept: CHIROPRACTIC MEDICINE | Age: 64
End: 2019-06-24

## 2019-06-24 ENCOUNTER — TELEPHONE (OUTPATIENT)
Dept: INTERNAL MEDICINE | Age: 64
End: 2019-06-24

## 2019-06-24 DIAGNOSIS — M99.04 SACRAL REGION SOMATIC DYSFUNCTION: ICD-10-CM

## 2019-06-24 DIAGNOSIS — M54.50 CHRONIC BILATERAL LOW BACK PAIN WITHOUT SCIATICA: Primary | ICD-10-CM

## 2019-06-24 DIAGNOSIS — G89.29 CHRONIC BILATERAL LOW BACK PAIN WITHOUT SCIATICA: Primary | ICD-10-CM

## 2019-06-24 DIAGNOSIS — M99.03 LUMBAR REGION SOMATIC DYSFUNCTION: ICD-10-CM

## 2019-06-24 DIAGNOSIS — M99.05 PELVIC SOMATIC DYSFUNCTION: ICD-10-CM

## 2019-06-24 PROCEDURE — 98941 CHIROPRACT MANJ 3-4 REGIONS: CPT | Performed by: CHIROPRACTOR

## 2019-06-24 RX ORDER — AZITHROMYCIN 250 MG/1
500 TABLET, FILM COATED ORAL DAILY
Qty: 14 TABLET | Refills: 0 | Status: SHIPPED | OUTPATIENT
Start: 2019-06-24 | End: 2019-07-30 | Stop reason: SDUPTHER

## 2019-06-25 ENCOUNTER — NURSE ONLY (OUTPATIENT)
Dept: INTERNAL MEDICINE | Age: 64
End: 2019-06-25

## 2019-06-25 VITALS
SYSTOLIC BLOOD PRESSURE: 121 MMHG | DIASTOLIC BLOOD PRESSURE: 80 MMHG | HEART RATE: 114 BPM | OXYGEN SATURATION: 96 % | RESPIRATION RATE: 18 BRPM

## 2019-06-25 DIAGNOSIS — J45.51 SEVERE PERSISTENT ASTHMA WITH EXACERBATION: Primary | ICD-10-CM

## 2019-06-25 PROCEDURE — 96372 THER/PROPH/DIAG INJ SC/IM: CPT | Performed by: INTERNAL MEDICINE

## 2019-06-25 RX ORDER — IPRATROPIUM BROMIDE AND ALBUTEROL SULFATE 2.5; .5 MG/3ML; MG/3ML
3 SOLUTION RESPIRATORY (INHALATION) EVERY 4 HOURS PRN
Qty: 900 ML | Refills: 0 | Status: SHIPPED | OUTPATIENT
Start: 2019-06-25 | End: 2019-10-01 | Stop reason: SDUPTHER

## 2019-06-26 ENCOUNTER — E-ADVICE (OUTPATIENT)
Dept: ALLERGY | Age: 64
End: 2019-06-26

## 2019-07-01 ENCOUNTER — TELEPHONE (OUTPATIENT)
Dept: NEUROLOGY | Age: 64
End: 2019-07-01

## 2019-07-01 ENCOUNTER — TELEPHONE (OUTPATIENT)
Dept: INTERNAL MEDICINE | Age: 64
End: 2019-07-01

## 2019-07-01 RX ORDER — METOLAZONE 2.5 MG/1
2.5 TABLET ORAL
Qty: 10 TABLET | Refills: 0 | Status: SHIPPED | OUTPATIENT
Start: 2019-07-01 | End: 2019-07-15 | Stop reason: ALTCHOICE

## 2019-07-02 ENCOUNTER — TELEPHONE (OUTPATIENT)
Dept: INTERNAL MEDICINE | Age: 64
End: 2019-07-02

## 2019-07-05 ENCOUNTER — TELEPHONE (OUTPATIENT)
Dept: NEPHROLOGY | Age: 64
End: 2019-07-05

## 2019-07-05 ENCOUNTER — TELEPHONE (OUTPATIENT)
Dept: INTERNAL MEDICINE | Age: 64
End: 2019-07-05

## 2019-07-05 RX ORDER — LEVOFLOXACIN 500 MG/1
TABLET, FILM COATED ORAL
Qty: 6 TABLET | Refills: 0 | Status: SHIPPED | COMMUNITY
Start: 2019-07-05 | End: 2019-07-15 | Stop reason: ALTCHOICE

## 2019-07-08 DIAGNOSIS — I10 ESSENTIAL HYPERTENSION WITH GOAL BLOOD PRESSURE LESS THAN 140/90: ICD-10-CM

## 2019-07-09 RX ORDER — PRAZOSIN HYDROCHLORIDE 2 MG/1
2 CAPSULE ORAL NIGHTLY
Qty: 90 CAPSULE | Refills: 3 | Status: SHIPPED | OUTPATIENT
Start: 2019-07-09 | End: 2019-08-12 | Stop reason: DRUGHIGH

## 2019-07-11 ENCOUNTER — E-ADVICE (OUTPATIENT)
Dept: NEPHROLOGY | Age: 64
End: 2019-07-11

## 2019-07-15 ENCOUNTER — E-ADVICE (OUTPATIENT)
Dept: CARDIOLOGY | Age: 64
End: 2019-07-15

## 2019-07-15 ENCOUNTER — LAB SERVICES (OUTPATIENT)
Dept: LAB | Age: 64
End: 2019-07-15

## 2019-07-15 ENCOUNTER — TELEPHONE (OUTPATIENT)
Dept: NEPHROLOGY | Age: 64
End: 2019-07-15

## 2019-07-15 ENCOUNTER — OFFICE VISIT (OUTPATIENT)
Dept: INTERNAL MEDICINE | Age: 64
End: 2019-07-15

## 2019-07-15 VITALS
DIASTOLIC BLOOD PRESSURE: 70 MMHG | SYSTOLIC BLOOD PRESSURE: 138 MMHG | RESPIRATION RATE: 16 BRPM | BODY MASS INDEX: 41.82 KG/M2 | HEART RATE: 86 BPM | WEIGHT: 251.32 LBS | OXYGEN SATURATION: 95 %

## 2019-07-15 DIAGNOSIS — I20.1 PRINZMETAL ANGINA (CMD): ICD-10-CM

## 2019-07-15 DIAGNOSIS — I10 ESSENTIAL HYPERTENSION WITH GOAL BLOOD PRESSURE LESS THAN 140/90: ICD-10-CM

## 2019-07-15 DIAGNOSIS — N18.30 CKD (CHRONIC KIDNEY DISEASE), STAGE III (CMD): ICD-10-CM

## 2019-07-15 DIAGNOSIS — J45.909 INTRINSIC ASTHMA: Primary | ICD-10-CM

## 2019-07-15 LAB
ALBUMIN SERPL-MCNC: 3.5 G/DL (ref 3.6–5.1)
ALBUMIN/GLOB SERPL: 1 {RATIO} (ref 1–2.4)
ALP SERPL-CCNC: 75 UNITS/L (ref 45–117)
ALT SERPL-CCNC: 32 UNITS/L
ANION GAP SERPL CALC-SCNC: 10 MMOL/L (ref 10–20)
AST SERPL-CCNC: 32 UNITS/L
BILIRUB SERPL-MCNC: 0.4 MG/DL (ref 0.2–1)
BUN SERPL-MCNC: 17 MG/DL (ref 6–20)
BUN/CREAT SERPL: 13 (ref 7–25)
CALCIUM SERPL-MCNC: 9 MG/DL (ref 8.4–10.2)
CHLORIDE SERPL-SCNC: 103 MMOL/L (ref 98–107)
CO2 SERPL-SCNC: 31 MMOL/L (ref 21–32)
CREAT SERPL-MCNC: 1.27 MG/DL (ref 0.51–0.95)
FASTING STATUS PATIENT QL REPORTED: 3 HRS
GLOBULIN SER-MCNC: 3.4 G/DL (ref 2–4)
GLUCOSE SERPL-MCNC: 143 MG/DL (ref 65–99)
MAGNESIUM SERPL-MCNC: 2.4 MG/DL (ref 1.7–2.4)
POTASSIUM SERPL-SCNC: 4.2 MMOL/L (ref 3.4–5.1)
PROT SERPL-MCNC: 6.9 G/DL (ref 6.4–8.2)
SODIUM SERPL-SCNC: 140 MMOL/L (ref 135–145)

## 2019-07-15 PROCEDURE — 99495 TRANSJ CARE MGMT MOD F2F 14D: CPT | Performed by: INTERNAL MEDICINE

## 2019-07-15 PROCEDURE — 80053 COMPREHEN METABOLIC PANEL: CPT | Performed by: INTERNAL MEDICINE

## 2019-07-15 PROCEDURE — 83735 ASSAY OF MAGNESIUM: CPT | Performed by: INTERNAL MEDICINE

## 2019-07-15 RX ORDER — AZITHROMYCIN 500 MG/1
TABLET, FILM COATED ORAL
Qty: 4 TABLET | Refills: 0 | Status: SHIPPED | OUTPATIENT
Start: 2019-07-15 | End: 2019-07-15 | Stop reason: SDUPTHER

## 2019-07-15 RX ORDER — LEVOFLOXACIN 750 MG/1
750 TABLET, FILM COATED ORAL EVERY OTHER DAY
Qty: 3 TABLET | Refills: 0 | Status: SHIPPED | OUTPATIENT
Start: 2019-07-15 | End: 2019-07-23 | Stop reason: ALTCHOICE

## 2019-07-15 RX ORDER — LEVOFLOXACIN 750 MG/1
750 TABLET, FILM COATED ORAL EVERY OTHER DAY
Qty: 3 TABLET | Refills: 0 | Status: SHIPPED | OUTPATIENT
Start: 2019-07-15 | End: 2019-07-15 | Stop reason: SDUPTHER

## 2019-07-15 RX ORDER — AZITHROMYCIN 250 MG/1
1000 TABLET, FILM COATED ORAL
Qty: 8 TABLET | Refills: 0 | Status: SHIPPED | OUTPATIENT
Start: 2019-07-15 | End: 2019-10-01 | Stop reason: SDUPTHER

## 2019-07-16 ENCOUNTER — E-ADVICE (OUTPATIENT)
Dept: NEPHROLOGY | Age: 64
End: 2019-07-16

## 2019-07-16 RX ORDER — POTASSIUM CITRATE 10 MEQ/1
TABLET, EXTENDED RELEASE ORAL DAILY
Qty: 100 TABLET | Refills: 0 | OUTPATIENT
Start: 2019-07-16

## 2019-07-16 RX ORDER — POTASSIUM CITRATE 10 MEQ/1
10 TABLET, EXTENDED RELEASE ORAL
Qty: 90 TABLET | Refills: 0 | Status: SHIPPED | OUTPATIENT
Start: 2019-07-16 | End: 2019-07-17 | Stop reason: SDUPTHER

## 2019-07-16 RX ORDER — POTASSIUM CITRATE 10 MEQ/1
10 TABLET, EXTENDED RELEASE ORAL
Qty: 30 TABLET | Refills: 0 | Status: SHIPPED | OUTPATIENT
Start: 2019-07-16 | End: 2019-07-16 | Stop reason: SDUPTHER

## 2019-07-17 ENCOUNTER — OFFICE VISIT (OUTPATIENT)
Dept: CHIROPRACTIC MEDICINE | Age: 64
End: 2019-07-17

## 2019-07-17 DIAGNOSIS — M99.04 SACRAL REGION SOMATIC DYSFUNCTION: ICD-10-CM

## 2019-07-17 DIAGNOSIS — M99.03 LUMBAR REGION SOMATIC DYSFUNCTION: ICD-10-CM

## 2019-07-17 DIAGNOSIS — G89.29 CHRONIC BILATERAL LOW BACK PAIN WITHOUT SCIATICA: Primary | ICD-10-CM

## 2019-07-17 DIAGNOSIS — M99.05 PELVIC SOMATIC DYSFUNCTION: ICD-10-CM

## 2019-07-17 DIAGNOSIS — M53.3 SACRAL PAIN: ICD-10-CM

## 2019-07-17 DIAGNOSIS — M54.50 CHRONIC BILATERAL LOW BACK PAIN WITHOUT SCIATICA: Primary | ICD-10-CM

## 2019-07-17 PROCEDURE — 98941 CHIROPRACT MANJ 3-4 REGIONS: CPT | Performed by: CHIROPRACTOR

## 2019-07-17 RX ORDER — POTASSIUM CITRATE 10 MEQ/1
10 TABLET, EXTENDED RELEASE ORAL
Qty: 30 TABLET | Refills: 0 | Status: SHIPPED | OUTPATIENT
Start: 2019-07-17 | End: 2019-07-26 | Stop reason: SDUPTHER

## 2019-07-17 RX ORDER — POTASSIUM CITRATE 10 MEQ/1
10 TABLET, EXTENDED RELEASE ORAL
Qty: 30 TABLET | Refills: 0 | Status: SHIPPED | OUTPATIENT
Start: 2019-07-17 | End: 2019-12-13 | Stop reason: SDUPTHER

## 2019-07-19 ENCOUNTER — OFFICE VISIT (OUTPATIENT)
Dept: NEUROLOGY | Age: 64
End: 2019-07-19

## 2019-07-19 VITALS
BODY MASS INDEX: 41.27 KG/M2 | WEIGHT: 248 LBS | DIASTOLIC BLOOD PRESSURE: 68 MMHG | SYSTOLIC BLOOD PRESSURE: 104 MMHG | HEART RATE: 70 BPM

## 2019-07-19 DIAGNOSIS — R25.1 TREMOR: ICD-10-CM

## 2019-07-19 DIAGNOSIS — G43.009 MIGRAINE WITHOUT AURA AND WITHOUT STATUS MIGRAINOSUS, NOT INTRACTABLE: Primary | ICD-10-CM

## 2019-07-19 DIAGNOSIS — G31.84 MILD COGNITIVE IMPAIRMENT WITH MEMORY LOSS: ICD-10-CM

## 2019-07-19 PROCEDURE — 99213 OFFICE O/P EST LOW 20 MIN: CPT | Performed by: PSYCHIATRY & NEUROLOGY

## 2019-07-22 RX ORDER — POTASSIUM CHLORIDE 750 MG/1
TABLET, EXTENDED RELEASE ORAL
Qty: 360 TABLET | Refills: 0 | Status: SHIPPED | OUTPATIENT
Start: 2019-07-22 | End: 2019-10-01 | Stop reason: SDUPTHER

## 2019-07-22 RX ORDER — LOSARTAN POTASSIUM 25 MG/1
25 TABLET ORAL DAILY
Qty: 90 TABLET | Refills: 2 | Status: SHIPPED | OUTPATIENT
Start: 2019-07-22 | End: 2019-09-25 | Stop reason: DRUGHIGH

## 2019-07-23 ENCOUNTER — NURSE ONLY (OUTPATIENT)
Dept: INTERNAL MEDICINE | Age: 64
End: 2019-07-23

## 2019-07-23 ENCOUNTER — OFFICE VISIT (OUTPATIENT)
Dept: PULMONOLOGY | Age: 64
End: 2019-07-23

## 2019-07-23 VITALS
HEART RATE: 92 BPM | WEIGHT: 250 LBS | BODY MASS INDEX: 41.6 KG/M2 | SYSTOLIC BLOOD PRESSURE: 108 MMHG | OXYGEN SATURATION: 98 % | DIASTOLIC BLOOD PRESSURE: 70 MMHG

## 2019-07-23 VITALS
SYSTOLIC BLOOD PRESSURE: 140 MMHG | RESPIRATION RATE: 18 BRPM | OXYGEN SATURATION: 98 % | HEART RATE: 85 BPM | DIASTOLIC BLOOD PRESSURE: 77 MMHG

## 2019-07-23 DIAGNOSIS — D80.1 HYPOGAMMAGLOBULINEMIA (CMD): ICD-10-CM

## 2019-07-23 DIAGNOSIS — J98.01 ACUTE BRONCHOSPASM: ICD-10-CM

## 2019-07-23 DIAGNOSIS — G47.9 SLEEP DISTURBANCES: ICD-10-CM

## 2019-07-23 DIAGNOSIS — J45.50 SEVERE PERSISTENT ASTHMA, UNSPECIFIED WHETHER COMPLICATED: Primary | ICD-10-CM

## 2019-07-23 DIAGNOSIS — K21.9 GASTROESOPHAGEAL REFLUX DISEASE, ESOPHAGITIS PRESENCE NOT SPECIFIED: ICD-10-CM

## 2019-07-23 DIAGNOSIS — J45.51 SEVERE PERSISTENT ASTHMA WITH ACUTE EXACERBATION: Primary | ICD-10-CM

## 2019-07-23 PROCEDURE — 99215 OFFICE O/P EST HI 40 MIN: CPT | Performed by: INTERNAL MEDICINE

## 2019-07-23 PROCEDURE — 96372 THER/PROPH/DIAG INJ SC/IM: CPT | Performed by: INTERNAL MEDICINE

## 2019-07-23 RX ORDER — CODEINE PHOSPHATE AND GUAIFENESIN 10; 100 MG/5ML; MG/5ML
5 SOLUTION ORAL 3 TIMES DAILY PRN
Qty: 240 ML | Refills: 0 | Status: SHIPPED | OUTPATIENT
Start: 2019-07-23 | End: 2019-11-18 | Stop reason: SDUPTHER

## 2019-07-24 ENCOUNTER — TELEPHONE (OUTPATIENT)
Dept: ALLERGY | Age: 64
End: 2019-07-24

## 2019-07-26 ENCOUNTER — OFFICE VISIT (OUTPATIENT)
Dept: CARDIOLOGY | Age: 64
End: 2019-07-26

## 2019-07-26 ENCOUNTER — OFFICE VISIT (OUTPATIENT)
Dept: CHIROPRACTIC MEDICINE | Age: 64
End: 2019-07-26

## 2019-07-26 VITALS
SYSTOLIC BLOOD PRESSURE: 120 MMHG | BODY MASS INDEX: 41.46 KG/M2 | RESPIRATION RATE: 16 BRPM | WEIGHT: 249.12 LBS | HEART RATE: 66 BPM | DIASTOLIC BLOOD PRESSURE: 72 MMHG

## 2019-07-26 DIAGNOSIS — R06.02 SHORTNESS OF BREATH: ICD-10-CM

## 2019-07-26 DIAGNOSIS — M54.50 CHRONIC BILATERAL LOW BACK PAIN WITHOUT SCIATICA: Primary | ICD-10-CM

## 2019-07-26 DIAGNOSIS — G89.29 CHRONIC BILATERAL LOW BACK PAIN WITHOUT SCIATICA: Primary | ICD-10-CM

## 2019-07-26 DIAGNOSIS — I10 ESSENTIAL HYPERTENSION WITH GOAL BLOOD PRESSURE LESS THAN 140/90: ICD-10-CM

## 2019-07-26 DIAGNOSIS — I71.20 THORACIC AORTIC ANEURYSM WITHOUT RUPTURE (CMD): ICD-10-CM

## 2019-07-26 DIAGNOSIS — M99.03 LUMBAR REGION SOMATIC DYSFUNCTION: ICD-10-CM

## 2019-07-26 DIAGNOSIS — I06.2 AORTIC VALVE STENOSIS AND INSUFFICIENCY, RHEUMATIC: ICD-10-CM

## 2019-07-26 DIAGNOSIS — M99.05 PELVIC SOMATIC DYSFUNCTION: ICD-10-CM

## 2019-07-26 DIAGNOSIS — I34.0 MITRAL VALVE INSUFFICIENCY, UNSPECIFIED ETIOLOGY: ICD-10-CM

## 2019-07-26 DIAGNOSIS — E78.00 PURE HYPERCHOLESTEROLEMIA: ICD-10-CM

## 2019-07-26 DIAGNOSIS — I25.10 CORONARY ARTERY DISEASE INVOLVING NATIVE CORONARY ARTERY OF NATIVE HEART WITHOUT ANGINA PECTORIS: Primary | ICD-10-CM

## 2019-07-26 PROCEDURE — 98940 CHIROPRACT MANJ 1-2 REGIONS: CPT | Performed by: CHIROPRACTOR

## 2019-07-26 PROCEDURE — 99213 OFFICE O/P EST LOW 20 MIN: CPT | Performed by: INTERNAL MEDICINE

## 2019-07-29 ENCOUNTER — HOSPITAL ENCOUNTER (OUTPATIENT)
Dept: OBGYN | Age: 64
Discharge: HOME | End: 2019-07-29
Attending: INTERNAL MEDICINE

## 2019-07-29 DIAGNOSIS — Z78.0 MENOPAUSE: ICD-10-CM

## 2019-07-29 DIAGNOSIS — Z12.31 VISIT FOR SCREENING MAMMOGRAM: ICD-10-CM

## 2019-07-29 PROCEDURE — 77063 BREAST TOMOSYNTHESIS BI: CPT | Performed by: RADIOLOGY

## 2019-07-29 PROCEDURE — 77063 BREAST TOMOSYNTHESIS BI: CPT

## 2019-07-29 PROCEDURE — 77080 DXA BONE DENSITY AXIAL: CPT

## 2019-07-29 PROCEDURE — 77067 SCR MAMMO BI INCL CAD: CPT | Performed by: RADIOLOGY

## 2019-07-29 PROCEDURE — 77080 DXA BONE DENSITY AXIAL: CPT | Performed by: RADIOLOGY

## 2019-07-30 ENCOUNTER — TELEPHONE (OUTPATIENT)
Dept: INTERNAL MEDICINE | Age: 64
End: 2019-07-30

## 2019-07-30 RX ORDER — AZITHROMYCIN 250 MG/1
500 TABLET, FILM COATED ORAL DAILY
Qty: 14 TABLET | Refills: 0 | Status: SHIPPED | OUTPATIENT
Start: 2019-07-30 | End: 2019-08-06

## 2019-07-31 ENCOUNTER — E-ADVICE (OUTPATIENT)
Dept: INTERNAL MEDICINE | Age: 64
End: 2019-07-31

## 2019-08-02 ENCOUNTER — OFFICE VISIT (OUTPATIENT)
Dept: CHIROPRACTIC MEDICINE | Age: 64
End: 2019-08-02

## 2019-08-02 DIAGNOSIS — M53.3 SACRAL PAIN: Primary | ICD-10-CM

## 2019-08-02 DIAGNOSIS — M99.04 SACRAL REGION SOMATIC DYSFUNCTION: ICD-10-CM

## 2019-08-02 DIAGNOSIS — M54.6 PAIN IN THORACIC SPINE: ICD-10-CM

## 2019-08-02 DIAGNOSIS — M99.05 PELVIC SOMATIC DYSFUNCTION: ICD-10-CM

## 2019-08-02 DIAGNOSIS — M99.03 LUMBAR REGION SOMATIC DYSFUNCTION: ICD-10-CM

## 2019-08-02 DIAGNOSIS — M99.02 THORACIC REGION SOMATIC DYSFUNCTION: ICD-10-CM

## 2019-08-02 PROCEDURE — 98941 CHIROPRACT MANJ 3-4 REGIONS: CPT | Performed by: CHIROPRACTOR

## 2019-08-07 ENCOUNTER — OFFICE VISIT (OUTPATIENT)
Dept: CHIROPRACTIC MEDICINE | Age: 64
End: 2019-08-07

## 2019-08-07 ENCOUNTER — E-ADVICE (OUTPATIENT)
Dept: INTERNAL MEDICINE | Age: 64
End: 2019-08-07

## 2019-08-07 DIAGNOSIS — G89.29 CHRONIC BILATERAL LOW BACK PAIN WITHOUT SCIATICA: Primary | ICD-10-CM

## 2019-08-07 DIAGNOSIS — M99.02 THORACIC REGION SOMATIC DYSFUNCTION: ICD-10-CM

## 2019-08-07 DIAGNOSIS — M99.04 SACRAL REGION SOMATIC DYSFUNCTION: ICD-10-CM

## 2019-08-07 DIAGNOSIS — M54.50 CHRONIC BILATERAL LOW BACK PAIN WITHOUT SCIATICA: Primary | ICD-10-CM

## 2019-08-07 DIAGNOSIS — M99.05 PELVIC SOMATIC DYSFUNCTION: ICD-10-CM

## 2019-08-07 DIAGNOSIS — M53.3 SACRAL PAIN: ICD-10-CM

## 2019-08-07 PROCEDURE — 98940 CHIROPRACT MANJ 1-2 REGIONS: CPT | Performed by: CHIROPRACTOR

## 2019-08-07 RX ORDER — NITROGLYCERIN 0.4 MG/1
TABLET SUBLINGUAL
Qty: 25 TABLET | Refills: 0 | OUTPATIENT
Start: 2019-08-07

## 2019-08-08 ENCOUNTER — TELEPHONE (OUTPATIENT)
Dept: PULMONOLOGY | Age: 64
End: 2019-08-08

## 2019-08-08 ENCOUNTER — MED INFO FORMS (OUTPATIENT)
Dept: HEALTH INFORMATION MANAGEMENT | Age: 64
End: 2019-08-08

## 2019-08-09 ENCOUNTER — TELEPHONE (OUTPATIENT)
Dept: INTERNAL MEDICINE | Age: 64
End: 2019-08-09

## 2019-08-09 DIAGNOSIS — I10 ESSENTIAL HYPERTENSION WITH GOAL BLOOD PRESSURE LESS THAN 140/90: ICD-10-CM

## 2019-08-11 ENCOUNTER — E-ADVICE (OUTPATIENT)
Dept: INTERNAL MEDICINE | Age: 64
End: 2019-08-11

## 2019-08-12 RX ORDER — PRAZOSIN HYDROCHLORIDE 2 MG/1
2 CAPSULE ORAL NIGHTLY
Qty: 90 CAPSULE | Refills: 3 | Status: SHIPPED | OUTPATIENT
Start: 2019-08-12 | End: 2020-08-24 | Stop reason: SDUPTHER

## 2019-08-13 ENCOUNTER — E-ADVICE (OUTPATIENT)
Dept: INTERNAL MEDICINE | Age: 64
End: 2019-08-13

## 2019-08-16 ENCOUNTER — OFFICE VISIT (OUTPATIENT)
Dept: CHIROPRACTIC MEDICINE | Age: 64
End: 2019-08-16

## 2019-08-16 DIAGNOSIS — M54.6 PAIN IN THORACIC SPINE: ICD-10-CM

## 2019-08-16 DIAGNOSIS — M53.3 SACRAL PAIN: ICD-10-CM

## 2019-08-16 DIAGNOSIS — M99.05 PELVIC SOMATIC DYSFUNCTION: ICD-10-CM

## 2019-08-16 DIAGNOSIS — M54.50 CHRONIC BILATERAL LOW BACK PAIN WITHOUT SCIATICA: Primary | ICD-10-CM

## 2019-08-16 DIAGNOSIS — G89.29 CHRONIC BILATERAL LOW BACK PAIN WITHOUT SCIATICA: Primary | ICD-10-CM

## 2019-08-16 DIAGNOSIS — M99.02 THORACIC REGION SOMATIC DYSFUNCTION: ICD-10-CM

## 2019-08-16 PROCEDURE — 98940 CHIROPRACT MANJ 1-2 REGIONS: CPT | Performed by: CHIROPRACTOR

## 2019-08-20 ENCOUNTER — APPOINTMENT (OUTPATIENT)
Dept: INTERNAL MEDICINE | Age: 64
End: 2019-08-20

## 2019-08-21 ENCOUNTER — E-ADVICE (OUTPATIENT)
Dept: NEUROLOGY | Age: 64
End: 2019-08-21

## 2019-08-22 RX ORDER — ERENUMAB-AOOE 70 MG/ML
70 INJECTION SUBCUTANEOUS
Qty: 1 ML | Refills: 11 | Status: SHIPPED | OUTPATIENT
Start: 2019-08-22 | End: 2019-08-26 | Stop reason: SDUPTHER

## 2019-08-26 ENCOUNTER — TELEPHONE (OUTPATIENT)
Dept: ALLERGY | Age: 64
End: 2019-08-26

## 2019-08-26 RX ORDER — ERENUMAB-AOOE 70 MG/ML
70 INJECTION SUBCUTANEOUS
Qty: 3.36 ML | Refills: 3 | Status: SHIPPED | OUTPATIENT
Start: 2019-08-26 | End: 2019-09-03 | Stop reason: SDUPTHER

## 2019-08-27 ENCOUNTER — NURSE ONLY (OUTPATIENT)
Dept: INTERNAL MEDICINE | Age: 64
End: 2019-08-27

## 2019-08-27 VITALS
HEART RATE: 102 BPM | RESPIRATION RATE: 20 BRPM | DIASTOLIC BLOOD PRESSURE: 72 MMHG | OXYGEN SATURATION: 97 % | SYSTOLIC BLOOD PRESSURE: 128 MMHG

## 2019-08-27 DIAGNOSIS — J45.50 SEVERE PERSISTENT ASTHMA, UNSPECIFIED WHETHER COMPLICATED: Primary | ICD-10-CM

## 2019-08-27 PROCEDURE — 96372 THER/PROPH/DIAG INJ SC/IM: CPT | Performed by: INTERNAL MEDICINE

## 2019-09-03 ENCOUNTER — TELEPHONE (OUTPATIENT)
Dept: NEUROLOGY | Age: 64
End: 2019-09-03

## 2019-09-03 ENCOUNTER — TELEPHONE (OUTPATIENT)
Dept: ALLERGY | Age: 64
End: 2019-09-03

## 2019-09-03 ENCOUNTER — TELEPHONE (OUTPATIENT)
Dept: INTERNAL MEDICINE | Age: 64
End: 2019-09-03

## 2019-09-03 RX ORDER — ERENUMAB-AOOE 70 MG/ML
70 INJECTION SUBCUTANEOUS
Qty: 3.36 ML | Refills: 3 | Status: SHIPPED | OUTPATIENT
Start: 2019-09-03 | End: 2019-09-27 | Stop reason: SDUPTHER

## 2019-09-04 ENCOUNTER — TELEPHONE (OUTPATIENT)
Dept: PULMONOLOGY | Age: 64
End: 2019-09-04

## 2019-09-06 ENCOUNTER — TELEPHONE (OUTPATIENT)
Dept: ALLERGY | Age: 64
End: 2019-09-06

## 2019-09-06 DIAGNOSIS — D80.1 HYPOGAMMAGLOBULINEMIA (CMD): Primary | ICD-10-CM

## 2019-09-09 RX ORDER — POTASSIUM CITRATE 10 MEQ/1
TABLET, EXTENDED RELEASE ORAL
Qty: 100 TABLET | Refills: 6 | Status: SHIPPED | OUTPATIENT
Start: 2019-09-09 | End: 2020-01-30 | Stop reason: SDUPTHER

## 2019-09-11 ENCOUNTER — TELEPHONE (OUTPATIENT)
Dept: ALLERGY | Age: 64
End: 2019-09-11

## 2019-09-12 ENCOUNTER — E-ADVICE (OUTPATIENT)
Dept: INTERNAL MEDICINE | Age: 64
End: 2019-09-12

## 2019-09-13 RX ORDER — FUROSEMIDE 40 MG/1
TABLET ORAL
Qty: 300 TABLET | Refills: 0 | Status: SHIPPED | OUTPATIENT
Start: 2019-09-13 | End: 2019-10-26 | Stop reason: SDUPTHER

## 2019-09-17 RX ORDER — DIPHENHYDRAMINE HCL 25 MG
25 CAPSULE ORAL ONCE
Status: CANCELLED | OUTPATIENT
Start: 2019-09-19

## 2019-09-17 RX ORDER — ACETAMINOPHEN 325 MG/1
650 TABLET ORAL ONCE
Status: CANCELLED | OUTPATIENT
Start: 2019-09-19

## 2019-09-19 ENCOUNTER — TELEPHONE (OUTPATIENT)
Dept: NEUROLOGY | Age: 64
End: 2019-09-19

## 2019-09-19 ENCOUNTER — CASE MANAGEMENT (OUTPATIENT)
Dept: ALLERGY | Age: 64
End: 2019-09-19

## 2019-09-19 ENCOUNTER — TELEPHONE (OUTPATIENT)
Dept: ALLERGY | Age: 64
End: 2019-09-19

## 2019-09-19 ENCOUNTER — TELEPHONE (OUTPATIENT)
Dept: HEMATOLOGY/ONCOLOGY | Age: 64
End: 2019-09-19

## 2019-09-20 ENCOUNTER — OFFICE VISIT (OUTPATIENT)
Dept: HEMATOLOGY/ONCOLOGY | Age: 64
End: 2019-09-20
Attending: ALLERGY & IMMUNOLOGY

## 2019-09-20 DIAGNOSIS — D80.1 HYPOGAMMAGLOBULINEMIA (CMD): Primary | ICD-10-CM

## 2019-09-20 DIAGNOSIS — D83.9 COMMON VARIABLE IMMUNODEFICIENCY (CMD): ICD-10-CM

## 2019-09-20 PROCEDURE — 10002807 HB RX 258: Performed by: ALLERGY & IMMUNOLOGY

## 2019-09-20 PROCEDURE — A4212 NON CORING NEEDLE OR STYLET: HCPCS

## 2019-09-20 PROCEDURE — 96365 THER/PROPH/DIAG IV INF INIT: CPT | Performed by: ALLERGY & IMMUNOLOGY

## 2019-09-20 PROCEDURE — 96366 THER/PROPH/DIAG IV INF ADDON: CPT | Performed by: ALLERGY & IMMUNOLOGY

## 2019-09-20 PROCEDURE — 10002800 HB RX 250 W HCPCS: Performed by: ALLERGY & IMMUNOLOGY

## 2019-09-20 RX ORDER — PREDNISONE 20 MG/1
TABLET ORAL
Qty: 2 TABLET | Refills: 11 | Status: SHIPPED
Start: 2019-09-20 | End: 2019-09-20 | Stop reason: CLARIF

## 2019-09-20 RX ORDER — ACETAMINOPHEN 325 MG/1
650 TABLET ORAL ONCE
Status: CANCELLED | OUTPATIENT
Start: 2019-10-01

## 2019-09-20 RX ORDER — DIPHENHYDRAMINE HCL 25 MG
25 CAPSULE ORAL ONCE
Status: DISCONTINUED | OUTPATIENT
Start: 2019-09-20 | End: 2019-09-20 | Stop reason: HOSPADM

## 2019-09-20 RX ORDER — DIPHENHYDRAMINE HCL 25 MG
25 CAPSULE ORAL ONCE
Status: CANCELLED | OUTPATIENT
Start: 2019-10-01

## 2019-09-20 RX ORDER — ACETAMINOPHEN 325 MG/1
650 TABLET ORAL ONCE
Status: DISCONTINUED | OUTPATIENT
Start: 2019-09-20 | End: 2019-09-20 | Stop reason: HOSPADM

## 2019-09-20 RX ADMIN — SODIUM CHLORIDE 250 ML: 9 INJECTION, SOLUTION INTRAVENOUS at 10:00

## 2019-09-20 RX ADMIN — Medication 500 UNITS: at 16:18

## 2019-09-20 RX ADMIN — IMMUNE GLOBULIN INFUSION (HUMAN) 25 G: 100 INJECTION, SOLUTION INTRAVENOUS; SUBCUTANEOUS at 10:38

## 2019-09-20 ASSESSMENT — PAIN SCALES - GENERAL: PAINLEVEL: 5-6

## 2019-09-23 ENCOUNTER — TELEPHONE (OUTPATIENT)
Dept: INTERNAL MEDICINE | Age: 64
End: 2019-09-23

## 2019-09-23 DIAGNOSIS — D80.1 HYPOGAMMAGLOBULINEMIA (CMD): Primary | ICD-10-CM

## 2019-09-23 RX ORDER — AZITHROMYCIN 250 MG/1
500 TABLET, FILM COATED ORAL DAILY
Qty: 14 TABLET | Refills: 0 | Status: SHIPPED | OUTPATIENT
Start: 2019-09-23 | End: 2020-01-27 | Stop reason: SDUPTHER

## 2019-09-24 ENCOUNTER — TELEPHONE (OUTPATIENT)
Dept: NEUROLOGY | Age: 64
End: 2019-09-24

## 2019-09-24 ENCOUNTER — E-ADVICE (OUTPATIENT)
Dept: NEUROLOGY | Age: 64
End: 2019-09-24

## 2019-09-24 ENCOUNTER — TELEPHONE (OUTPATIENT)
Dept: PULMONOLOGY | Age: 64
End: 2019-09-24

## 2019-09-24 ENCOUNTER — TELEPHONE (OUTPATIENT)
Dept: ALLERGY | Age: 64
End: 2019-09-24

## 2019-09-25 ENCOUNTER — OFFICE VISIT (OUTPATIENT)
Dept: NEPHROLOGY | Age: 64
End: 2019-09-25

## 2019-09-25 ENCOUNTER — LAB SERVICES (OUTPATIENT)
Dept: LAB | Age: 64
End: 2019-09-25

## 2019-09-25 VITALS
WEIGHT: 250 LBS | HEART RATE: 88 BPM | SYSTOLIC BLOOD PRESSURE: 142 MMHG | DIASTOLIC BLOOD PRESSURE: 102 MMHG | BODY MASS INDEX: 41.6 KG/M2

## 2019-09-25 DIAGNOSIS — N18.30 CHRONIC KIDNEY DISEASE, STAGE III (MODERATE) (CMD): ICD-10-CM

## 2019-09-25 DIAGNOSIS — D63.1 ANEMIA OF CHRONIC RENAL FAILURE, STAGE 3 (MODERATE) (CMD): ICD-10-CM

## 2019-09-25 DIAGNOSIS — I10 ESSENTIAL HYPERTENSION WITH GOAL BLOOD PRESSURE LESS THAN 140/90: ICD-10-CM

## 2019-09-25 DIAGNOSIS — N18.30 ANEMIA OF CHRONIC RENAL FAILURE, STAGE 3 (MODERATE) (CMD): ICD-10-CM

## 2019-09-25 DIAGNOSIS — N18.30 CHRONIC KIDNEY DISEASE, STAGE III (MODERATE) (CMD): Primary | ICD-10-CM

## 2019-09-25 DIAGNOSIS — N20.0 NEPHROLITHIASIS: ICD-10-CM

## 2019-09-25 LAB
ALBUMIN SERPL-MCNC: 4.2 G/DL (ref 3.6–5.1)
ANION GAP SERPL CALC-SCNC: 13 MMOL/L (ref 10–20)
BUN SERPL-MCNC: 16 MG/DL (ref 6–20)
BUN/CREAT SERPL: 11 (ref 7–25)
CALCIUM SERPL-MCNC: 9.8 MG/DL (ref 8.4–10.2)
CHLORIDE SERPL-SCNC: 100 MMOL/L (ref 98–107)
CO2 SERPL-SCNC: 31 MMOL/L (ref 21–32)
CREAT SERPL-MCNC: 1.47 MG/DL (ref 0.51–0.95)
FASTING STATUS PATIENT QL REPORTED: ABNORMAL HRS
GLUCOSE SERPL-MCNC: 93 MG/DL (ref 65–99)
HCT VFR BLD CALC: 40.5 % (ref 36–46.5)
HGB BLD-MCNC: 13.5 G/DL (ref 12–15.5)
KETONES UR STRIP-MCNC: ABNORMAL MG/DL
MAGNESIUM SERPL-MCNC: 2.4 MG/DL (ref 1.7–2.4)
PHOSPHATE SERPL-MCNC: 3.8 MG/DL (ref 2.4–4.7)
POTASSIUM SERPL-SCNC: 4.8 MMOL/L (ref 3.4–5.1)
SODIUM SERPL-SCNC: 139 MMOL/L (ref 135–145)
URINE BACTERIA: ABNORMAL
URINE BILI: ABNORMAL
URINE BLOOD: ABNORMAL
URINE CASTS: ABNORMAL
URINE CHARACTER: CLEAR
URINE COLOR: ABNORMAL
URINE CRYSTALS: ABNORMAL
URINE EP. CELLS: ABNORMAL
URINE GLUCOSE: ABNORMAL
URINE HEMOGLOBIN: ABNORMAL
URINE LEUKO: ABNORMAL
URINE MICRO: ABNORMAL
URINE NITRITE: ABNORMAL
URINE PH: 7 (ref 5–8)
URINE PROTEIN: ABNORMAL
URINE RBC: ABNORMAL
URINE SPECIFIC GRAVITY: 1 (ref 1.01–1.03)
URINE WBC: ABNORMAL

## 2019-09-25 PROCEDURE — 85018 HEMOGLOBIN: CPT | Performed by: INTERNAL MEDICINE

## 2019-09-25 PROCEDURE — 81000 URINALYSIS NONAUTO W/SCOPE: CPT | Performed by: INTERNAL MEDICINE

## 2019-09-25 PROCEDURE — 85014 HEMATOCRIT: CPT | Performed by: INTERNAL MEDICINE

## 2019-09-25 PROCEDURE — 83735 ASSAY OF MAGNESIUM: CPT | Performed by: INTERNAL MEDICINE

## 2019-09-25 PROCEDURE — 36415 COLL VENOUS BLD VENIPUNCTURE: CPT | Performed by: INTERNAL MEDICINE

## 2019-09-25 PROCEDURE — 80069 RENAL FUNCTION PANEL: CPT | Performed by: INTERNAL MEDICINE

## 2019-09-25 PROCEDURE — 99214 OFFICE O/P EST MOD 30 MIN: CPT | Performed by: INTERNAL MEDICINE

## 2019-09-25 RX ORDER — LOSARTAN POTASSIUM 50 MG/1
50 TABLET ORAL DAILY
Qty: 30 TABLET | Refills: 1 | Status: SHIPPED
Start: 2019-09-25 | End: 2019-10-24 | Stop reason: SDUPTHER

## 2019-09-26 ENCOUNTER — E-ADVICE (OUTPATIENT)
Dept: NEUROLOGY | Age: 64
End: 2019-09-26

## 2019-09-26 ENCOUNTER — TELEPHONE (OUTPATIENT)
Dept: PULMONOLOGY | Age: 64
End: 2019-09-26

## 2019-09-26 LAB — 25(OH)D3+25(OH)D2 SERPL-MCNC: 30.8 NG/ML (ref 30–100)

## 2019-09-27 ENCOUNTER — TELEPHONE (OUTPATIENT)
Dept: NEUROLOGY | Age: 64
End: 2019-09-27

## 2019-09-27 RX ORDER — ERENUMAB-AOOE 70 MG/ML
70 INJECTION SUBCUTANEOUS
Qty: 3.36 ML | Refills: 3 | Status: SHIPPED | OUTPATIENT
Start: 2019-09-27 | End: 2019-12-13

## 2019-09-30 ENCOUNTER — E-ADVICE (OUTPATIENT)
Dept: NEPHROLOGY | Age: 64
End: 2019-09-30

## 2019-10-01 ENCOUNTER — OFFICE VISIT (OUTPATIENT)
Dept: INTERNAL MEDICINE | Age: 64
End: 2019-10-01

## 2019-10-01 VITALS
BODY MASS INDEX: 42.91 KG/M2 | HEART RATE: 70 BPM | HEIGHT: 64 IN | RESPIRATION RATE: 16 BRPM | SYSTOLIC BLOOD PRESSURE: 120 MMHG | OXYGEN SATURATION: 94 % | DIASTOLIC BLOOD PRESSURE: 80 MMHG | WEIGHT: 251.32 LBS

## 2019-10-01 DIAGNOSIS — N18.30 CKD (CHRONIC KIDNEY DISEASE), STAGE III (CMD): ICD-10-CM

## 2019-10-01 DIAGNOSIS — Z00.00 MEDICARE WELCOME VISIT: Primary | ICD-10-CM

## 2019-10-01 DIAGNOSIS — E11.9 TYPE 2 DIABETES MELLITUS WITHOUT COMPLICATION, WITHOUT LONG-TERM CURRENT USE OF INSULIN (CMD): ICD-10-CM

## 2019-10-01 PROCEDURE — G0439 PPPS, SUBSEQ VISIT: HCPCS | Performed by: INTERNAL MEDICINE

## 2019-10-01 RX ORDER — AZITHROMYCIN 250 MG/1
1000 TABLET, FILM COATED ORAL
Qty: 8 TABLET | Refills: 0 | Status: SHIPPED | OUTPATIENT
Start: 2019-10-01 | End: 2019-12-19 | Stop reason: SDUPTHER

## 2019-10-01 RX ORDER — POLYETHYLENE GLYCOL 3350 17 G/17G
34 POWDER, FOR SOLUTION ORAL DAILY
Qty: 180 EACH | Refills: 3 | Status: ON HOLD | OUTPATIENT
Start: 2019-10-01 | End: 2020-06-04 | Stop reason: SDUPTHER

## 2019-10-01 RX ORDER — POTASSIUM CHLORIDE 750 MG/1
TABLET, EXTENDED RELEASE ORAL
Qty: 360 TABLET | Refills: 3 | Status: SHIPPED | OUTPATIENT
Start: 2019-10-01 | End: 2020-04-06 | Stop reason: SDUPTHER

## 2019-10-01 RX ORDER — ONDANSETRON 4 MG/1
4 TABLET, FILM COATED ORAL EVERY 8 HOURS PRN
Qty: 20 TABLET | Refills: 2 | Status: SHIPPED | OUTPATIENT
Start: 2019-10-01 | End: 2020-09-28 | Stop reason: SDUPTHER

## 2019-10-01 RX ORDER — IPRATROPIUM BROMIDE AND ALBUTEROL SULFATE 2.5; .5 MG/3ML; MG/3ML
3 SOLUTION RESPIRATORY (INHALATION) EVERY 4 HOURS PRN
Qty: 900 ML | Refills: 0 | Status: SHIPPED | OUTPATIENT
Start: 2019-10-01 | End: 2019-12-26

## 2019-10-01 RX ORDER — POLYETHYLENE GLYCOL 3350 17 G/17G
17 POWDER, FOR SOLUTION ORAL 2 TIMES DAILY
Qty: 3162 G | Refills: 3 | Status: CANCELLED | OUTPATIENT
Start: 2019-10-01

## 2019-10-01 SDOH — HEALTH STABILITY: MENTAL HEALTH: HOW OFTEN DO YOU HAVE A DRINK CONTAINING ALCOHOL?: NEVER

## 2019-10-01 SDOH — ECONOMIC STABILITY: FOOD INSECURITY: WITHIN THE PAST 12 MONTHS, YOU WORRIED THAT YOUR FOOD WOULD RUN OUT BEFORE YOU GOT MONEY TO BUY MORE.: PATIENT DECLINED

## 2019-10-01 SDOH — ECONOMIC STABILITY: FOOD INSECURITY: WITHIN THE PAST 12 MONTHS, THE FOOD YOU BOUGHT JUST DIDN'T LAST AND YOU DIDN'T HAVE MONEY TO GET MORE.: PATIENT DECLINED

## 2019-10-01 SDOH — HEALTH STABILITY: MENTAL HEALTH: HOW OFTEN DO YOU HAVE 6 OR MORE DRINKS ON ONE OCCASION?: NEVER

## 2019-10-01 SDOH — ECONOMIC STABILITY: INCOME INSECURITY: HOW HARD IS IT FOR YOU TO PAY FOR THE VERY BASICS LIKE FOOD, HOUSING, MEDICAL CARE, AND HEATING?: PATIENT DECLINED

## 2019-10-01 SDOH — ECONOMIC STABILITY: TRANSPORTATION INSECURITY
IN THE PAST 12 MONTHS, HAS LACK OF TRANSPORTATION KEPT YOU FROM MEETINGS, WORK, OR FROM GETTING THINGS NEEDED FOR DAILY LIVING?: PATIENT DECLINED

## 2019-10-01 SDOH — ECONOMIC STABILITY: TRANSPORTATION INSECURITY
IN THE PAST 12 MONTHS, HAS THE LACK OF TRANSPORTATION KEPT YOU FROM MEDICAL APPOINTMENTS OR FROM GETTING MEDICATIONS?: PATIENT DECLINED

## 2019-10-01 SDOH — HEALTH STABILITY: MENTAL HEALTH: HOW MANY STANDARD DRINKS CONTAINING ALCOHOL DO YOU HAVE ON A TYPICAL DAY?: 1 OR 2

## 2019-10-01 ASSESSMENT — VISUAL ACUITY
OS_CC: 20/25
OD_CC: 20/25

## 2019-10-02 ENCOUNTER — TELEPHONE (OUTPATIENT)
Dept: OTHER | Age: 64
End: 2019-10-02

## 2019-10-03 RX ORDER — FLUTICASONE PROPIONATE AND SALMETEROL 50; 500 UG/1; UG/1
POWDER RESPIRATORY (INHALATION)
Qty: 180 EACH | Refills: 1 | Status: SHIPPED | OUTPATIENT
Start: 2019-10-03 | End: 2019-12-27 | Stop reason: ALTCHOICE

## 2019-10-03 RX ORDER — BUTALBITAL, ACETAMINOPHEN AND CAFFEINE 50; 325; 40 MG/1; MG/1; MG/1
1 TABLET ORAL EVERY 4 HOURS PRN
Qty: 30 TABLET | Refills: 3 | Status: SHIPPED | OUTPATIENT
Start: 2019-10-03 | End: 2019-10-17 | Stop reason: SDUPTHER

## 2019-10-07 ENCOUNTER — TELEPHONE (OUTPATIENT)
Dept: OPHTHALMOLOGY | Age: 64
End: 2019-10-07

## 2019-10-08 ENCOUNTER — TELEPHONE (OUTPATIENT)
Dept: ALLERGY | Age: 64
End: 2019-10-08

## 2019-10-09 ENCOUNTER — OFFICE VISIT (OUTPATIENT)
Dept: INTERNAL MEDICINE | Age: 64
End: 2019-10-09

## 2019-10-09 VITALS
WEIGHT: 253.53 LBS | SYSTOLIC BLOOD PRESSURE: 106 MMHG | OXYGEN SATURATION: 96 % | DIASTOLIC BLOOD PRESSURE: 70 MMHG | RESPIRATION RATE: 16 BRPM | HEART RATE: 80 BPM | BODY MASS INDEX: 44.21 KG/M2

## 2019-10-09 DIAGNOSIS — J01.01 ACUTE RECURRENT MAXILLARY SINUSITIS: Primary | ICD-10-CM

## 2019-10-09 PROCEDURE — 99213 OFFICE O/P EST LOW 20 MIN: CPT | Performed by: INTERNAL MEDICINE

## 2019-10-09 RX ORDER — LEVOFLOXACIN 750 MG/1
750 TABLET, FILM COATED ORAL DAILY
Qty: 7 TABLET | Refills: 1 | Status: SHIPPED | OUTPATIENT
Start: 2019-10-09 | End: 2019-11-22 | Stop reason: SDUPTHER

## 2019-10-09 RX ORDER — DICYCLOMINE HYDROCHLORIDE 10 MG/1
10 CAPSULE ORAL
Qty: 90 CAPSULE | Refills: 6 | Status: SHIPPED | OUTPATIENT
Start: 2019-10-09 | End: 2020-05-06

## 2019-10-10 ENCOUNTER — E-ADVICE (OUTPATIENT)
Dept: NEPHROLOGY | Age: 64
End: 2019-10-10

## 2019-10-10 RX ORDER — BUTALBITAL, ACETAMINOPHEN AND CAFFEINE 50; 325; 40 MG/1; MG/1; MG/1
1 TABLET ORAL EVERY 4 HOURS PRN
Qty: 30 TABLET | Refills: 3 | OUTPATIENT
Start: 2019-10-10

## 2019-10-15 ENCOUNTER — OFFICE VISIT (OUTPATIENT)
Dept: PULMONOLOGY | Age: 64
End: 2019-10-15

## 2019-10-15 VITALS
BODY MASS INDEX: 43.96 KG/M2 | OXYGEN SATURATION: 98 % | HEART RATE: 88 BPM | SYSTOLIC BLOOD PRESSURE: 112 MMHG | DIASTOLIC BLOOD PRESSURE: 68 MMHG | WEIGHT: 252.1 LBS

## 2019-10-15 DIAGNOSIS — G47.9 SLEEP DISTURBANCES: ICD-10-CM

## 2019-10-15 DIAGNOSIS — J45.909 SEVERE ASTHMA WITHOUT COMPLICATION, UNSPECIFIED WHETHER PERSISTENT: Primary | ICD-10-CM

## 2019-10-15 DIAGNOSIS — J98.01 ACUTE BRONCHOSPASM: ICD-10-CM

## 2019-10-15 PROCEDURE — 96372 THER/PROPH/DIAG INJ SC/IM: CPT | Performed by: INTERNAL MEDICINE

## 2019-10-15 PROCEDURE — 99214 OFFICE O/P EST MOD 30 MIN: CPT | Performed by: INTERNAL MEDICINE

## 2019-10-15 RX ORDER — FLUTICASONE PROPIONATE 50 MCG
1 SPRAY, SUSPENSION (ML) NASAL DAILY
Qty: 48 G | Refills: 3 | Status: SHIPPED | OUTPATIENT
Start: 2019-10-15 | End: 2019-12-27 | Stop reason: SDUPTHER

## 2019-10-17 ENCOUNTER — OFFICE VISIT (OUTPATIENT)
Dept: OBGYN | Age: 64
End: 2019-10-17

## 2019-10-17 VITALS — DIASTOLIC BLOOD PRESSURE: 62 MMHG | SYSTOLIC BLOOD PRESSURE: 142 MMHG | HEART RATE: 72 BPM

## 2019-10-17 DIAGNOSIS — N32.81 OAB (OVERACTIVE BLADDER): Primary | ICD-10-CM

## 2019-10-17 DIAGNOSIS — N94.10 DYSPAREUNIA, FEMALE: ICD-10-CM

## 2019-10-17 PROCEDURE — 99214 OFFICE O/P EST MOD 30 MIN: CPT | Performed by: OBSTETRICS & GYNECOLOGY

## 2019-10-17 RX ORDER — BUTALBITAL, ACETAMINOPHEN AND CAFFEINE 50; 325; 40 MG/1; MG/1; MG/1
1 TABLET ORAL EVERY 4 HOURS PRN
Qty: 30 TABLET | Refills: 3 | Status: SHIPPED | OUTPATIENT
Start: 2019-10-17 | End: 2020-04-09 | Stop reason: SDUPTHER

## 2019-10-18 ENCOUNTER — APPOINTMENT (OUTPATIENT)
Dept: HEMATOLOGY/ONCOLOGY | Age: 64
End: 2019-10-18
Attending: ALLERGY & IMMUNOLOGY

## 2019-10-18 ENCOUNTER — OFFICE VISIT (OUTPATIENT)
Dept: HEMATOLOGY/ONCOLOGY | Age: 64
End: 2019-10-18
Attending: ALLERGY & IMMUNOLOGY

## 2019-10-18 DIAGNOSIS — D83.9 COMMON VARIABLE IMMUNODEFICIENCY (CMD): ICD-10-CM

## 2019-10-18 DIAGNOSIS — D80.1 HYPOGAMMAGLOBULINEMIA (CMD): Primary | ICD-10-CM

## 2019-10-18 PROCEDURE — 96366 THER/PROPH/DIAG IV INF ADDON: CPT | Performed by: ALLERGY & IMMUNOLOGY

## 2019-10-18 PROCEDURE — A4212 NON CORING NEEDLE OR STYLET: HCPCS

## 2019-10-18 PROCEDURE — 10002800 HB RX 250 W HCPCS: Performed by: ALLERGY & IMMUNOLOGY

## 2019-10-18 PROCEDURE — 96365 THER/PROPH/DIAG IV INF INIT: CPT | Performed by: ALLERGY & IMMUNOLOGY

## 2019-10-18 PROCEDURE — 10002807 HB RX 258: Performed by: ALLERGY & IMMUNOLOGY

## 2019-10-18 RX ORDER — IMMUNE GLOBULIN INFUSION (HUMAN) 100 MG/ML
25 INJECTION, SOLUTION INTRAVENOUS; SUBCUTANEOUS ONCE
Status: COMPLETED | OUTPATIENT
Start: 2019-10-18 | End: 2019-10-18

## 2019-10-18 RX ORDER — ACETAMINOPHEN 325 MG/1
650 TABLET ORAL ONCE
Status: CANCELLED | OUTPATIENT
Start: 2020-01-01

## 2019-10-18 RX ORDER — IMMUNE GLOBULIN INFUSION (HUMAN) 100 MG/ML
25 INJECTION, SOLUTION INTRAVENOUS; SUBCUTANEOUS ONCE
Status: CANCELLED | OUTPATIENT
Start: 2020-01-01

## 2019-10-18 RX ORDER — 0.9 % SODIUM CHLORIDE 0.9 %
2 VIAL (ML) INJECTION PRN
Status: CANCELLED | OUTPATIENT
Start: 2020-01-01

## 2019-10-18 RX ORDER — DIPHENHYDRAMINE HCL 25 MG
25 CAPSULE ORAL ONCE
Status: CANCELLED | OUTPATIENT
Start: 2020-01-01

## 2019-10-18 RX ADMIN — IMMUNE GLOBULIN INFUSION (HUMAN) 25 G: 100 INJECTION, SOLUTION INTRAVENOUS; SUBCUTANEOUS at 08:54

## 2019-10-18 RX ADMIN — HEPARIN SODIUM (PORCINE) LOCK FLUSH IV SOLN 100 UNIT/ML 500 UNITS: 100 SOLUTION at 14:54

## 2019-10-18 RX ADMIN — SODIUM CHLORIDE 250 ML: 9 INJECTION, SOLUTION INTRAVENOUS at 08:51

## 2019-10-18 ASSESSMENT — PAIN SCALES - GENERAL: PAINLEVEL: 1-2

## 2019-10-22 ENCOUNTER — APPOINTMENT (OUTPATIENT)
Dept: PULMONOLOGY | Age: 64
End: 2019-10-22

## 2019-10-23 ENCOUNTER — E-ADVICE (OUTPATIENT)
Dept: OBGYN | Age: 64
End: 2019-10-23

## 2019-10-23 ENCOUNTER — E-ADVICE (OUTPATIENT)
Dept: NEPHROLOGY | Age: 64
End: 2019-10-23

## 2019-10-24 RX ORDER — LOSARTAN POTASSIUM 50 MG/1
50 TABLET ORAL DAILY
Qty: 90 TABLET | Refills: 2 | Status: SHIPPED | OUTPATIENT
Start: 2019-10-24 | End: 2019-11-05 | Stop reason: ALTCHOICE

## 2019-10-25 RX ORDER — FESOTERODINE FUMARATE 4 MG/1
4 TABLET, EXTENDED RELEASE ORAL DAILY
Qty: 90 TABLET | Refills: 4 | Status: SHIPPED | OUTPATIENT
Start: 2019-10-25 | End: 2019-11-20 | Stop reason: CLARIF

## 2019-10-28 RX ORDER — FUROSEMIDE 40 MG/1
TABLET ORAL
Qty: 300 TABLET | Refills: 0 | Status: SHIPPED | OUTPATIENT
Start: 2019-10-28 | End: 2019-12-05 | Stop reason: SDUPTHER

## 2019-11-04 ENCOUNTER — CASE MANAGEMENT (OUTPATIENT)
Dept: CARE COORDINATION | Age: 64
End: 2019-11-04

## 2019-11-04 ENCOUNTER — E-ADVICE (OUTPATIENT)
Dept: NEPHROLOGY | Age: 64
End: 2019-11-04

## 2019-11-05 RX ORDER — VALSARTAN 160 MG/1
160 TABLET ORAL DAILY
Qty: 90 TABLET | Refills: 1 | Status: SHIPPED | OUTPATIENT
Start: 2019-11-05 | End: 2020-02-06

## 2019-11-11 ENCOUNTER — TELEPHONE (OUTPATIENT)
Dept: PULMONOLOGY | Age: 64
End: 2019-11-11

## 2019-11-11 DIAGNOSIS — J45.909 SEVERE ASTHMA WITHOUT COMPLICATION, UNSPECIFIED WHETHER PERSISTENT: Primary | ICD-10-CM

## 2019-11-12 ENCOUNTER — APPOINTMENT (OUTPATIENT)
Dept: INTERNAL MEDICINE | Age: 64
End: 2019-11-12

## 2019-11-15 ENCOUNTER — OFFICE VISIT (OUTPATIENT)
Dept: HEMATOLOGY/ONCOLOGY | Age: 64
End: 2019-11-15
Attending: ALLERGY & IMMUNOLOGY

## 2019-11-15 VITALS
OXYGEN SATURATION: 96 % | DIASTOLIC BLOOD PRESSURE: 67 MMHG | HEART RATE: 72 BPM | SYSTOLIC BLOOD PRESSURE: 138 MMHG | TEMPERATURE: 97.5 F

## 2019-11-15 DIAGNOSIS — D83.9 COMMON VARIABLE IMMUNODEFICIENCY (CMD): ICD-10-CM

## 2019-11-15 DIAGNOSIS — D80.1 HYPOGAMMAGLOBULINEMIA (CMD): Primary | ICD-10-CM

## 2019-11-15 PROCEDURE — 10002807 HB RX 258: Performed by: ALLERGY & IMMUNOLOGY

## 2019-11-15 PROCEDURE — 10004651 HB RX, NO CHARGE ITEM: Performed by: ALLERGY & IMMUNOLOGY

## 2019-11-15 PROCEDURE — 96366 THER/PROPH/DIAG IV INF ADDON: CPT | Performed by: ALLERGY & IMMUNOLOGY

## 2019-11-15 PROCEDURE — 10002800 HB RX 250 W HCPCS: Performed by: ALLERGY & IMMUNOLOGY

## 2019-11-15 PROCEDURE — A4212 NON CORING NEEDLE OR STYLET: HCPCS

## 2019-11-15 PROCEDURE — 96365 THER/PROPH/DIAG IV INF INIT: CPT | Performed by: ALLERGY & IMMUNOLOGY

## 2019-11-15 PROCEDURE — 10002803 HB RX 637: Performed by: ALLERGY & IMMUNOLOGY

## 2019-11-15 RX ORDER — IMMUNE GLOBULIN INFUSION (HUMAN) 100 MG/ML
25 INJECTION, SOLUTION INTRAVENOUS; SUBCUTANEOUS ONCE
Status: COMPLETED | OUTPATIENT
Start: 2019-11-15 | End: 2019-11-15

## 2019-11-15 RX ORDER — ACETAMINOPHEN 325 MG/1
650 TABLET ORAL ONCE
Status: COMPLETED | OUTPATIENT
Start: 2019-11-15 | End: 2019-11-15

## 2019-11-15 RX ORDER — DIPHENHYDRAMINE HCL 25 MG
25 CAPSULE ORAL ONCE
Status: CANCELLED | OUTPATIENT
Start: 2020-01-01

## 2019-11-15 RX ORDER — IMMUNE GLOBULIN INFUSION (HUMAN) 100 MG/ML
25 INJECTION, SOLUTION INTRAVENOUS; SUBCUTANEOUS ONCE
Status: CANCELLED | OUTPATIENT
Start: 2020-01-01

## 2019-11-15 RX ORDER — ACETAMINOPHEN 325 MG/1
650 TABLET ORAL ONCE
Status: CANCELLED | OUTPATIENT
Start: 2020-01-01

## 2019-11-15 RX ORDER — DIPHENHYDRAMINE HCL 25 MG
25 CAPSULE ORAL ONCE
Status: COMPLETED | OUTPATIENT
Start: 2019-11-15 | End: 2019-11-15

## 2019-11-15 RX ORDER — 0.9 % SODIUM CHLORIDE 0.9 %
2 VIAL (ML) INJECTION PRN
Status: CANCELLED | OUTPATIENT
Start: 2020-01-01

## 2019-11-15 RX ADMIN — IMMUNE GLOBULIN INFUSION (HUMAN) 25 G: 100 INJECTION, SOLUTION INTRAVENOUS; SUBCUTANEOUS at 08:45

## 2019-11-15 RX ADMIN — SODIUM CHLORIDE 250 ML: 9 INJECTION, SOLUTION INTRAVENOUS at 08:44

## 2019-11-15 RX ADMIN — DIPHENHYDRAMINE HYDROCHLORIDE 25 MG: 25 CAPSULE ORAL at 06:30

## 2019-11-15 RX ADMIN — Medication 500 UNITS: at 14:18

## 2019-11-15 RX ADMIN — ACETAMINOPHEN 650 MG: 325 TABLET ORAL at 06:30

## 2019-11-16 ENCOUNTER — WALK IN (OUTPATIENT)
Dept: URGENT CARE | Age: 64
End: 2019-11-16

## 2019-11-16 ENCOUNTER — IMAGING SERVICES (OUTPATIENT)
Dept: GENERAL RADIOLOGY | Age: 64
End: 2019-11-16
Attending: INTERNAL MEDICINE

## 2019-11-16 DIAGNOSIS — R05.9 COUGH: ICD-10-CM

## 2019-11-16 DIAGNOSIS — J45.41 MODERATE PERSISTENT ASTHMA WITH EXACERBATION: Primary | ICD-10-CM

## 2019-11-16 PROCEDURE — 99214 OFFICE O/P EST MOD 30 MIN: CPT | Performed by: INTERNAL MEDICINE

## 2019-11-16 PROCEDURE — 71046 X-RAY EXAM CHEST 2 VIEWS: CPT | Performed by: RADIOLOGY

## 2019-11-16 RX ORDER — PREDNISONE 10 MG/1
TABLET ORAL
Qty: 20 TABLET | Refills: 0 | Status: SHIPPED | OUTPATIENT
Start: 2019-11-16 | End: 2019-12-04 | Stop reason: ALTCHOICE

## 2019-11-16 ASSESSMENT — PAIN SCALES - GENERAL: PAINLEVEL: 0

## 2019-11-18 ENCOUNTER — E-ADVICE (OUTPATIENT)
Dept: OBGYN | Age: 64
End: 2019-11-18

## 2019-11-18 ENCOUNTER — E-ADVICE (OUTPATIENT)
Dept: INTERNAL MEDICINE | Age: 64
End: 2019-11-18

## 2019-11-18 RX ORDER — CODEINE PHOSPHATE AND GUAIFENESIN 10; 100 MG/5ML; MG/5ML
5 SOLUTION ORAL 3 TIMES DAILY PRN
Qty: 240 ML | Refills: 0 | Status: SHIPPED | OUTPATIENT
Start: 2019-11-18 | End: 2019-12-04 | Stop reason: SDUPTHER

## 2019-11-22 ENCOUNTER — OFFICE VISIT (OUTPATIENT)
Dept: INTERNAL MEDICINE | Age: 64
End: 2019-11-22

## 2019-11-22 ENCOUNTER — LAB SERVICES (OUTPATIENT)
Dept: LAB | Age: 64
End: 2019-11-22

## 2019-11-22 VITALS
TEMPERATURE: 99.2 F | BODY MASS INDEX: 43.44 KG/M2 | RESPIRATION RATE: 16 BRPM | HEART RATE: 111 BPM | WEIGHT: 249.12 LBS | OXYGEN SATURATION: 97 % | SYSTOLIC BLOOD PRESSURE: 112 MMHG | DIASTOLIC BLOOD PRESSURE: 60 MMHG

## 2019-11-22 DIAGNOSIS — J45.909 ACUTE ASTHMATIC BRONCHITIS: Primary | ICD-10-CM

## 2019-11-22 DIAGNOSIS — E11.9 TYPE 2 DIABETES MELLITUS WITHOUT COMPLICATION, WITHOUT LONG-TERM CURRENT USE OF INSULIN (CMD): ICD-10-CM

## 2019-11-22 DIAGNOSIS — N18.30 CKD (CHRONIC KIDNEY DISEASE), STAGE III (CMD): ICD-10-CM

## 2019-11-22 LAB
ANION GAP SERPL CALC-SCNC: 14 MMOL/L (ref 10–20)
BUN SERPL-MCNC: 26 MG/DL (ref 6–20)
BUN/CREAT SERPL: 18 (ref 7–25)
CALCIUM SERPL-MCNC: 9.6 MG/DL (ref 8.4–10.2)
CHLORIDE SERPL-SCNC: 101 MMOL/L (ref 98–107)
CO2 SERPL-SCNC: 31 MMOL/L (ref 21–32)
CREAT SERPL-MCNC: 1.44 MG/DL (ref 0.51–0.95)
FASTING STATUS PATIENT QL REPORTED: 3.5 HRS
GLUCOSE SERPL-MCNC: 158 MG/DL (ref 65–99)
POTASSIUM SERPL-SCNC: 4.7 MMOL/L (ref 3.4–5.1)
SODIUM SERPL-SCNC: 141 MMOL/L (ref 135–145)

## 2019-11-22 PROCEDURE — 80048 BASIC METABOLIC PNL TOTAL CA: CPT | Performed by: INTERNAL MEDICINE

## 2019-11-22 PROCEDURE — 36415 COLL VENOUS BLD VENIPUNCTURE: CPT | Performed by: INTERNAL MEDICINE

## 2019-11-22 PROCEDURE — 99213 OFFICE O/P EST LOW 20 MIN: CPT | Performed by: INTERNAL MEDICINE

## 2019-11-22 RX ORDER — LEVOFLOXACIN 750 MG/1
750 TABLET, FILM COATED ORAL DAILY
Qty: 7 TABLET | Refills: 1 | Status: SHIPPED | OUTPATIENT
Start: 2019-11-22 | End: 2019-12-24 | Stop reason: SDUPTHER

## 2019-11-22 RX ORDER — PREDNISONE 20 MG/1
TABLET ORAL
Qty: 15 TABLET | Refills: 0 | Status: SHIPPED | OUTPATIENT
Start: 2019-11-22 | End: 2019-12-04 | Stop reason: SDUPTHER

## 2019-11-23 LAB — HBA1C MFR BLD: 7.1 % (ref 4.5–5.6)

## 2019-11-26 ENCOUNTER — E-ADVICE (OUTPATIENT)
Dept: NEPHROLOGY | Age: 64
End: 2019-11-26

## 2019-11-28 ENCOUNTER — E-ADVICE (OUTPATIENT)
Dept: INTERNAL MEDICINE | Age: 64
End: 2019-11-28

## 2019-11-29 RX ORDER — FLUCONAZOLE 100 MG/1
100 TABLET ORAL DAILY
Qty: 7 TABLET | Refills: 0 | Status: SHIPPED | OUTPATIENT
Start: 2019-11-29 | End: 2020-01-02 | Stop reason: SDUPTHER

## 2019-12-02 ENCOUNTER — TELEPHONE (OUTPATIENT)
Dept: INTERNAL MEDICINE | Age: 64
End: 2019-12-02

## 2019-12-04 ENCOUNTER — E-ADVICE (OUTPATIENT)
Dept: INTERNAL MEDICINE | Age: 64
End: 2019-12-04

## 2019-12-04 ENCOUNTER — OFFICE VISIT (OUTPATIENT)
Dept: ALLERGY | Age: 64
End: 2019-12-04

## 2019-12-04 VITALS
HEART RATE: 99 BPM | BODY MASS INDEX: 44.08 KG/M2 | SYSTOLIC BLOOD PRESSURE: 124 MMHG | DIASTOLIC BLOOD PRESSURE: 76 MMHG | WEIGHT: 258.2 LBS | OXYGEN SATURATION: 91 % | RESPIRATION RATE: 22 BRPM | HEIGHT: 64 IN | TEMPERATURE: 99.2 F

## 2019-12-04 DIAGNOSIS — D83.9 CVID (COMMON VARIABLE IMMUNODEFICIENCY) (CMD): Primary | ICD-10-CM

## 2019-12-04 PROCEDURE — 99213 OFFICE O/P EST LOW 20 MIN: CPT | Performed by: ALLERGY & IMMUNOLOGY

## 2019-12-04 RX ORDER — CODEINE PHOSPHATE AND GUAIFENESIN 10; 100 MG/5ML; MG/5ML
5 SOLUTION ORAL 3 TIMES DAILY PRN
Qty: 240 ML | Refills: 0 | Status: CANCELLED | OUTPATIENT
Start: 2019-12-04

## 2019-12-04 RX ORDER — PREDNISONE 20 MG/1
TABLET ORAL
Qty: 12 TABLET | Refills: 1 | Status: SHIPPED | OUTPATIENT
Start: 2019-12-04 | End: 2020-05-20 | Stop reason: SDUPTHER

## 2019-12-04 RX ORDER — CODEINE PHOSPHATE AND GUAIFENESIN 10; 100 MG/5ML; MG/5ML
5 SOLUTION ORAL 3 TIMES DAILY PRN
Qty: 240 ML | Refills: 0 | Status: SHIPPED | OUTPATIENT
Start: 2019-12-04 | End: 2019-12-23 | Stop reason: SDUPTHER

## 2019-12-06 ENCOUNTER — E-ADVICE (OUTPATIENT)
Dept: PULMONOLOGY | Age: 64
End: 2019-12-06

## 2019-12-06 RX ORDER — FUROSEMIDE 40 MG/1
TABLET ORAL
Qty: 300 TABLET | Refills: 0 | Status: SHIPPED | OUTPATIENT
Start: 2019-12-06 | End: 2020-01-27 | Stop reason: SDUPTHER

## 2019-12-06 RX ORDER — TIZANIDINE 2 MG/1
TABLET ORAL
Qty: 120 TABLET | Refills: 6 | Status: SHIPPED | OUTPATIENT
Start: 2019-12-06 | End: 2020-01-22 | Stop reason: SDUPTHER

## 2019-12-09 ENCOUNTER — E-ADVICE (OUTPATIENT)
Dept: PULMONOLOGY | Age: 64
End: 2019-12-09

## 2019-12-09 ENCOUNTER — CASE MANAGEMENT (OUTPATIENT)
Dept: CARE COORDINATION | Age: 64
End: 2019-12-09

## 2019-12-13 ENCOUNTER — OFFICE VISIT (OUTPATIENT)
Dept: CARDIOLOGY | Age: 64
End: 2019-12-13

## 2019-12-13 VITALS
SYSTOLIC BLOOD PRESSURE: 122 MMHG | BODY MASS INDEX: 44.7 KG/M2 | RESPIRATION RATE: 16 BRPM | HEART RATE: 84 BPM | WEIGHT: 258.38 LBS | DIASTOLIC BLOOD PRESSURE: 72 MMHG

## 2019-12-13 DIAGNOSIS — I71.20 THORACIC AORTIC ANEURYSM WITHOUT RUPTURE (CMD): ICD-10-CM

## 2019-12-13 DIAGNOSIS — I10 ESSENTIAL HYPERTENSION WITH GOAL BLOOD PRESSURE LESS THAN 140/90: ICD-10-CM

## 2019-12-13 DIAGNOSIS — E78.00 PURE HYPERCHOLESTEROLEMIA: ICD-10-CM

## 2019-12-13 DIAGNOSIS — I34.0 MITRAL VALVE INSUFFICIENCY, UNSPECIFIED ETIOLOGY: ICD-10-CM

## 2019-12-13 DIAGNOSIS — I20.1 PRINZMETAL ANGINA (CMD): ICD-10-CM

## 2019-12-13 DIAGNOSIS — R00.2 PALPITATIONS: ICD-10-CM

## 2019-12-13 DIAGNOSIS — I06.2 AORTIC VALVE STENOSIS AND INSUFFICIENCY, RHEUMATIC: ICD-10-CM

## 2019-12-13 DIAGNOSIS — I25.10 CORONARY ARTERY DISEASE INVOLVING NATIVE CORONARY ARTERY OF NATIVE HEART WITHOUT ANGINA PECTORIS: Primary | ICD-10-CM

## 2019-12-13 PROCEDURE — 99213 OFFICE O/P EST LOW 20 MIN: CPT | Performed by: INTERNAL MEDICINE

## 2019-12-16 ENCOUNTER — LAB SERVICES (OUTPATIENT)
Dept: HEMATOLOGY/ONCOLOGY | Age: 64
End: 2019-12-16
Attending: ALLERGY & IMMUNOLOGY

## 2019-12-16 ENCOUNTER — OFFICE VISIT (OUTPATIENT)
Dept: HEMATOLOGY/ONCOLOGY | Age: 64
End: 2019-12-16
Attending: ALLERGY & IMMUNOLOGY

## 2019-12-16 ENCOUNTER — TELEPHONE (OUTPATIENT)
Dept: ALLERGY | Age: 64
End: 2019-12-16

## 2019-12-16 DIAGNOSIS — E78.00 PURE HYPERCHOLESTEROLEMIA: ICD-10-CM

## 2019-12-16 DIAGNOSIS — D80.1 HYPOGAMMAGLOBULINEMIA (CMD): Primary | ICD-10-CM

## 2019-12-16 DIAGNOSIS — D80.1 HYPOGAMMAGLOBULINEMIA (CMD): ICD-10-CM

## 2019-12-16 DIAGNOSIS — D83.9 CVID (COMMON VARIABLE IMMUNODEFICIENCY) (CMD): ICD-10-CM

## 2019-12-16 DIAGNOSIS — D83.9 COMMON VARIABLE IMMUNODEFICIENCY (CMD): ICD-10-CM

## 2019-12-16 LAB
ALBUMIN SERPL-MCNC: 3.5 G/DL (ref 3.6–5.1)
ALBUMIN/GLOB SERPL: 1.1 {RATIO} (ref 1–2.4)
ALP SERPL-CCNC: 77 UNITS/L (ref 45–117)
ALT SERPL-CCNC: 34 UNITS/L
ANION GAP SERPL CALC-SCNC: 16 MMOL/L (ref 10–20)
AST SERPL-CCNC: 32 UNITS/L
BASOPHILS # BLD AUTO: 0 K/MCL (ref 0–0.3)
BASOPHILS NFR BLD AUTO: 0 %
BILIRUB SERPL-MCNC: 0.5 MG/DL (ref 0.2–1)
BUN SERPL-MCNC: 20 MG/DL (ref 6–20)
BUN/CREAT SERPL: 11 (ref 7–25)
CALCIUM SERPL-MCNC: 8.1 MG/DL (ref 8.4–10.2)
CHLORIDE SERPL-SCNC: 101 MMOL/L (ref 98–107)
CHOLEST SERPL-MCNC: 202 MG/DL
CHOLEST/HDLC SERPL: 2.5 {RATIO}
CO2 SERPL-SCNC: 25 MMOL/L (ref 21–32)
CREAT SERPL-MCNC: 1.74 MG/DL (ref 0.51–0.95)
DIFFERENTIAL METHOD BLD: ABNORMAL
EOSINOPHIL # BLD AUTO: 0 K/MCL (ref 0.1–0.5)
EOSINOPHIL NFR SPEC: 1 %
ERYTHROCYTE [DISTWIDTH] IN BLOOD: 13 % (ref 11–15)
FASTING STATUS PATIENT QL REPORTED: 0 HRS
GLOBULIN SER-MCNC: 3.3 G/DL (ref 2–4)
GLUCOSE SERPL-MCNC: 86 MG/DL (ref 65–99)
HCT VFR BLD CALC: 32.8 % (ref 36–46.5)
HDLC SERPL-MCNC: 80 MG/DL
HGB BLD-MCNC: 10.8 G/DL (ref 12–15.5)
LDLC SERPL-MCNC: 108 MG/DL
LENGTH OF FAST TIME PATIENT: NO HRS
LYMPHOCYTES # BLD MANUAL: 0.5 K/MCL (ref 1–4)
LYMPHOCYTES NFR BLD MANUAL: 8 %
MCH RBC QN AUTO: 30.4 PG (ref 26–34)
MCHC RBC AUTO-ENTMCNC: 32.9 G/DL (ref 32–36.5)
MCV RBC AUTO: 92.4 FL (ref 78–100)
MONOCYTES # BLD MANUAL: 0.3 K/MCL (ref 0.3–0.9)
MONOCYTES NFR BLD MANUAL: 5 %
NEUTROPHILS # BLD: 5.6 K/MCL (ref 1.8–7.7)
NEUTROPHILS NFR BLD AUTO: 86 %
NONHDLC SERPL-MCNC: 122 MG/DL
PLATELET # BLD: 230 K/MCL (ref 140–450)
POTASSIUM SERPL-SCNC: 4.4 MMOL/L (ref 3.4–5.1)
PROT SERPL-MCNC: 6.8 G/DL (ref 6.4–8.2)
RBC # BLD: 3.55 MIL/MCL (ref 4–5.2)
SODIUM SERPL-SCNC: 138 MMOL/L (ref 135–145)
TRIGL SERPL-MCNC: 72 MG/DL
WBC # BLD: 6.4 K/MCL (ref 4.2–11)

## 2019-12-16 PROCEDURE — 36415 COLL VENOUS BLD VENIPUNCTURE: CPT

## 2019-12-16 PROCEDURE — 96365 THER/PROPH/DIAG IV INF INIT: CPT | Performed by: ALLERGY & IMMUNOLOGY

## 2019-12-16 PROCEDURE — 85025 COMPLETE CBC W/AUTO DIFF WBC: CPT

## 2019-12-16 PROCEDURE — 10002800 HB RX 250 W HCPCS: Performed by: ALLERGY & IMMUNOLOGY

## 2019-12-16 PROCEDURE — 80053 COMPREHEN METABOLIC PANEL: CPT

## 2019-12-16 PROCEDURE — A4212 NON CORING NEEDLE OR STYLET: HCPCS

## 2019-12-16 PROCEDURE — 10002807 HB RX 258: Performed by: ALLERGY & IMMUNOLOGY

## 2019-12-16 PROCEDURE — 80061 LIPID PANEL: CPT

## 2019-12-16 PROCEDURE — 96366 THER/PROPH/DIAG IV INF ADDON: CPT | Performed by: ALLERGY & IMMUNOLOGY

## 2019-12-16 PROCEDURE — 82784 ASSAY IGA/IGD/IGG/IGM EACH: CPT

## 2019-12-16 RX ORDER — IMMUNE GLOBULIN INFUSION (HUMAN) 100 MG/ML
25 INJECTION, SOLUTION INTRAVENOUS; SUBCUTANEOUS ONCE
Status: CANCELLED | OUTPATIENT
Start: 2020-01-01

## 2019-12-16 RX ORDER — ACETAMINOPHEN 325 MG/1
650 TABLET ORAL ONCE
Status: DISCONTINUED | OUTPATIENT
Start: 2019-12-16 | End: 2019-12-16 | Stop reason: HOSPADM

## 2019-12-16 RX ORDER — ACETAMINOPHEN 325 MG/1
650 TABLET ORAL ONCE
Status: CANCELLED | OUTPATIENT
Start: 2020-01-01

## 2019-12-16 RX ORDER — DIPHENHYDRAMINE HCL 25 MG
25 CAPSULE ORAL ONCE
Status: CANCELLED | OUTPATIENT
Start: 2020-01-01

## 2019-12-16 RX ORDER — DIPHENHYDRAMINE HCL 25 MG
25 CAPSULE ORAL ONCE
Status: DISCONTINUED | OUTPATIENT
Start: 2019-12-16 | End: 2019-12-16 | Stop reason: HOSPADM

## 2019-12-16 RX ORDER — IMMUNE GLOBULIN INFUSION (HUMAN) 100 MG/ML
25 INJECTION, SOLUTION INTRAVENOUS; SUBCUTANEOUS ONCE
Status: COMPLETED | OUTPATIENT
Start: 2019-12-16 | End: 2019-12-16

## 2019-12-16 RX ORDER — 0.9 % SODIUM CHLORIDE 0.9 %
2 VIAL (ML) INJECTION PRN
Status: CANCELLED | OUTPATIENT
Start: 2020-01-01

## 2019-12-16 RX ADMIN — Medication 500 UNITS: at 14:47

## 2019-12-16 RX ADMIN — SODIUM CHLORIDE 250 ML: 9 INJECTION, SOLUTION INTRAVENOUS at 09:16

## 2019-12-16 RX ADMIN — IMMUNE GLOBULIN INFUSION (HUMAN) 25 G: 100 INJECTION, SOLUTION INTRAVENOUS; SUBCUTANEOUS at 09:38

## 2019-12-16 ASSESSMENT — PAIN SCALES - GENERAL: PAINLEVEL: 0

## 2019-12-17 ENCOUNTER — CASE MANAGEMENT (OUTPATIENT)
Dept: CARE COORDINATION | Age: 64
End: 2019-12-17

## 2019-12-17 ENCOUNTER — E-ADVICE (OUTPATIENT)
Dept: NEPHROLOGY | Age: 64
End: 2019-12-17

## 2019-12-17 ENCOUNTER — E-ADVICE (OUTPATIENT)
Dept: CARDIOLOGY | Age: 64
End: 2019-12-17

## 2019-12-17 DIAGNOSIS — I25.10 CORONARY ARTERY DISEASE INVOLVING NATIVE CORONARY ARTERY OF NATIVE HEART WITHOUT ANGINA PECTORIS: Primary | ICD-10-CM

## 2019-12-17 DIAGNOSIS — E78.00 PURE HYPERCHOLESTEROLEMIA: ICD-10-CM

## 2019-12-17 LAB
IGA SERPL-MCNC: 200 MG/DL (ref 82–453)
IGG SERPL-MCNC: 873 MG/DL (ref 751–1560)
IGM SERPL-MCNC: 36 MG/DL (ref 46–304)

## 2019-12-18 ENCOUNTER — E-ADVICE (OUTPATIENT)
Dept: INTERNAL MEDICINE | Age: 64
End: 2019-12-18

## 2019-12-19 RX ORDER — AZITHROMYCIN 250 MG/1
1000 TABLET, FILM COATED ORAL
Qty: 8 TABLET | Refills: 0 | Status: SHIPPED | OUTPATIENT
Start: 2019-12-19 | End: 2020-04-08 | Stop reason: SDUPTHER

## 2019-12-20 RX ORDER — EPINEPHRINE 0.3 MG/.3ML
0.3 INJECTION SUBCUTANEOUS
Qty: 1 EACH | Refills: 1 | Status: SHIPPED | OUTPATIENT
Start: 2019-12-20

## 2019-12-23 ENCOUNTER — TELEPHONE (OUTPATIENT)
Dept: PULMONOLOGY | Age: 64
End: 2019-12-23

## 2019-12-23 RX ORDER — CODEINE PHOSPHATE AND GUAIFENESIN 10; 100 MG/5ML; MG/5ML
5 SOLUTION ORAL 3 TIMES DAILY PRN
Qty: 240 ML | Refills: 0 | Status: SHIPPED | OUTPATIENT
Start: 2019-12-23 | End: 2020-01-07 | Stop reason: SDUPTHER

## 2019-12-23 RX ORDER — CODEINE PHOSPHATE AND GUAIFENESIN 10; 100 MG/5ML; MG/5ML
5 SOLUTION ORAL 3 TIMES DAILY PRN
Qty: 240 ML | Refills: 0 | Status: CANCELLED | OUTPATIENT
Start: 2019-12-23

## 2019-12-24 ENCOUNTER — OFFICE VISIT (OUTPATIENT)
Dept: INTERNAL MEDICINE | Age: 64
End: 2019-12-24

## 2019-12-24 ENCOUNTER — LAB SERVICES (OUTPATIENT)
Dept: LAB | Age: 64
End: 2019-12-24

## 2019-12-24 VITALS
WEIGHT: 253 LBS | DIASTOLIC BLOOD PRESSURE: 60 MMHG | RESPIRATION RATE: 16 BRPM | SYSTOLIC BLOOD PRESSURE: 100 MMHG | OXYGEN SATURATION: 97 % | HEART RATE: 71 BPM | BODY MASS INDEX: 43.77 KG/M2

## 2019-12-24 DIAGNOSIS — J45.41 MODERATE PERSISTENT ASTHMA WITH ACUTE EXACERBATION: ICD-10-CM

## 2019-12-24 DIAGNOSIS — J00 ACUTE NASOPHARYNGITIS: ICD-10-CM

## 2019-12-24 DIAGNOSIS — J00 ACUTE NASOPHARYNGITIS: Primary | ICD-10-CM

## 2019-12-24 PROCEDURE — 99213 OFFICE O/P EST LOW 20 MIN: CPT | Performed by: INTERNAL MEDICINE

## 2019-12-24 RX ORDER — PREDNISONE 20 MG/1
TABLET ORAL
Qty: 20 TABLET | Refills: 0 | Status: SHIPPED | OUTPATIENT
Start: 2019-12-24 | End: 2020-01-03 | Stop reason: SDUPTHER

## 2019-12-24 RX ORDER — LEVOFLOXACIN 750 MG/1
750 TABLET, FILM COATED ORAL DAILY
Qty: 10 TABLET | Refills: 1 | Status: SHIPPED | OUTPATIENT
Start: 2019-12-24 | End: 2020-04-14 | Stop reason: SDUPTHER

## 2019-12-25 LAB
C PNEUM DNA SPEC QL NAA+PROBE: NOT DETECTED
FLUAV H1 2009 PAND RNA NPH QL NAA+PROBE: NOT DETECTED
FLUAV H1 RNA NPH QL NAA+PROBE: NOT DETECTED
FLUAV H3 RNA NPH QL NAA+PROBE: NOT DETECTED
FLUAV RNA NPH QL NAA+PROBE: ABNORMAL
FLUBV RNA NPH QL NAA+PROBE: NOT DETECTED
HADV DNA NPH QL NAA+PROBE: NOT DETECTED
HBOV DNA SPEC QL NAA+PROBE: NOT DETECTED
HCOV 229E RNA SPEC QL NAA+PROBE: NOT DETECTED
HCOV HKU1 RNA SPEC QL NAA+PROBE: NOT DETECTED
HCOV NL63 RNA SPEC QL NAA+PROBE: NOT DETECTED
HCOV OC43 RNA SPEC QL NAA+PROBE: NOT DETECTED
HMPV RNA NPH QL NAA+PROBE: NOT DETECTED
HPIV1 RNA NPH QL NAA+PROBE: NOT DETECTED
HPIV2 RNA NPH QL NAA+PROBE: NOT DETECTED
HPIV3 RNA NPH QL NAA+PROBE: NOT DETECTED
HPIV4 RNA NPH QL NAA+PROBE: NOT DETECTED
M PNEUMO DNA SPEC QL NAA+PROBE: NOT DETECTED
RSV A RNA NPH QL NAA+PROBE: POSITIVE
RSV B RNA NPH QL NAA+PROBE: NOT DETECTED
RV+EV RNA SPEC QL NAA+PROBE: NOT DETECTED
SPECIMEN SOURCE: ABNORMAL

## 2019-12-26 ENCOUNTER — TELEPHONE (OUTPATIENT)
Dept: INTERNAL MEDICINE | Age: 64
End: 2019-12-26

## 2019-12-26 RX ORDER — IPRATROPIUM BROMIDE AND ALBUTEROL SULFATE 2.5; .5 MG/3ML; MG/3ML
3 SOLUTION RESPIRATORY (INHALATION) EVERY 4 HOURS PRN
Qty: 900 ML | Refills: 0 | Status: SHIPPED | OUTPATIENT
Start: 2019-12-26

## 2019-12-27 ENCOUNTER — TELEPHONE (OUTPATIENT)
Dept: PULMONOLOGY | Age: 64
End: 2019-12-27

## 2019-12-27 RX ORDER — FLUTICASONE PROPIONATE AND SALMETEROL 500; 50 UG/1; UG/1
1 POWDER RESPIRATORY (INHALATION)
Qty: 3 EACH | Refills: 3 | Status: SHIPPED | OUTPATIENT
Start: 2019-12-27 | End: 2020-04-20 | Stop reason: SDUPTHER

## 2019-12-27 RX ORDER — FLUTICASONE PROPIONATE 50 MCG
1 SPRAY, SUSPENSION (ML) NASAL DAILY
Qty: 48 G | Refills: 3 | Status: SHIPPED | OUTPATIENT
Start: 2019-12-27 | End: 2020-10-30 | Stop reason: SDUPTHER

## 2020-01-02 RX ORDER — FLUCONAZOLE 100 MG/1
100 TABLET ORAL DAILY
Qty: 7 TABLET | Refills: 0 | Status: SHIPPED | OUTPATIENT
Start: 2020-01-02 | End: 2020-01-22 | Stop reason: ALTCHOICE

## 2020-01-03 ENCOUNTER — TELEPHONE (OUTPATIENT)
Dept: INTERNAL MEDICINE | Age: 65
End: 2020-01-03

## 2020-01-03 RX ORDER — PREDNISONE 20 MG/1
TABLET ORAL
Qty: 8 TABLET | Refills: 0 | Status: ON HOLD | OUTPATIENT
Start: 2020-01-03 | End: 2020-06-06 | Stop reason: HOSPADM

## 2020-01-07 ENCOUNTER — OFFICE VISIT (OUTPATIENT)
Dept: PULMONOLOGY | Age: 65
End: 2020-01-07

## 2020-01-07 VITALS
OXYGEN SATURATION: 96 % | WEIGHT: 257.39 LBS | HEART RATE: 78 BPM | BODY MASS INDEX: 44.53 KG/M2 | DIASTOLIC BLOOD PRESSURE: 77 MMHG | SYSTOLIC BLOOD PRESSURE: 132 MMHG

## 2020-01-07 DIAGNOSIS — J45.51 SEVERE PERSISTENT ASTHMA WITH ACUTE EXACERBATION: Primary | ICD-10-CM

## 2020-01-07 DIAGNOSIS — G47.9 SLEEP DISTURBANCES: ICD-10-CM

## 2020-01-07 DIAGNOSIS — J39.8 TRACHEOMALACIA, ACQUIRED: ICD-10-CM

## 2020-01-07 DIAGNOSIS — R04.2 HEMOPTYSIS: ICD-10-CM

## 2020-01-07 DIAGNOSIS — D83.9 COMMON VARIABLE IMMUNODEFICIENCY (CMD): ICD-10-CM

## 2020-01-07 PROCEDURE — 99214 OFFICE O/P EST MOD 30 MIN: CPT | Performed by: INTERNAL MEDICINE

## 2020-01-07 RX ORDER — MONTELUKAST SODIUM 10 MG/1
10 TABLET ORAL EVERY EVENING
Qty: 30 TABLET | Refills: 6 | Status: SHIPPED | OUTPATIENT
Start: 2020-01-07 | End: 2020-08-05 | Stop reason: SDUPTHER

## 2020-01-07 RX ORDER — CODEINE PHOSPHATE AND GUAIFENESIN 10; 100 MG/5ML; MG/5ML
5 SOLUTION ORAL 3 TIMES DAILY PRN
Qty: 240 ML | Refills: 0 | Status: SHIPPED | OUTPATIENT
Start: 2020-01-07 | End: 2020-03-19 | Stop reason: SDUPTHER

## 2020-01-10 ENCOUNTER — TELEPHONE (OUTPATIENT)
Dept: PULMONOLOGY | Age: 65
End: 2020-01-10

## 2020-01-10 ENCOUNTER — TELEPHONE (OUTPATIENT)
Dept: INTERNAL MEDICINE | Age: 65
End: 2020-01-10

## 2020-01-13 ENCOUNTER — APPOINTMENT (OUTPATIENT)
Dept: HEMATOLOGY/ONCOLOGY | Age: 65
End: 2020-01-13
Attending: ALLERGY & IMMUNOLOGY

## 2020-01-13 ENCOUNTER — TELEPHONE (OUTPATIENT)
Dept: ALLERGY | Age: 65
End: 2020-01-13

## 2020-01-13 ENCOUNTER — OFFICE VISIT (OUTPATIENT)
Dept: HEMATOLOGY/ONCOLOGY | Age: 65
End: 2020-01-13
Attending: ALLERGY & IMMUNOLOGY

## 2020-01-13 VITALS
OXYGEN SATURATION: 98 % | DIASTOLIC BLOOD PRESSURE: 66 MMHG | TEMPERATURE: 97.6 F | SYSTOLIC BLOOD PRESSURE: 118 MMHG | HEART RATE: 105 BPM

## 2020-01-13 DIAGNOSIS — D83.9 CVID (COMMON VARIABLE IMMUNODEFICIENCY) (CMD): Primary | ICD-10-CM

## 2020-01-13 DIAGNOSIS — D83.9 COMMON VARIABLE IMMUNODEFICIENCY (CMD): ICD-10-CM

## 2020-01-13 DIAGNOSIS — D80.1 HYPOGAMMAGLOBULINEMIA (CMD): Primary | ICD-10-CM

## 2020-01-13 PROCEDURE — A4212 NON CORING NEEDLE OR STYLET: HCPCS

## 2020-01-13 PROCEDURE — 10002807 HB RX 258: Performed by: ALLERGY & IMMUNOLOGY

## 2020-01-13 PROCEDURE — 96365 THER/PROPH/DIAG IV INF INIT: CPT | Performed by: ALLERGY & IMMUNOLOGY

## 2020-01-13 PROCEDURE — 96366 THER/PROPH/DIAG IV INF ADDON: CPT | Performed by: ALLERGY & IMMUNOLOGY

## 2020-01-13 PROCEDURE — 10002800 HB RX 250 W HCPCS: Performed by: ALLERGY & IMMUNOLOGY

## 2020-01-13 RX ORDER — HEPARIN 100 UNIT/ML
5 SYRINGE INTRAVENOUS PRN
Status: CANCELLED | OUTPATIENT
Start: 2020-01-13

## 2020-01-13 RX ORDER — ACETAMINOPHEN 325 MG/1
650 TABLET ORAL ONCE
Status: DISCONTINUED | OUTPATIENT
Start: 2020-01-13 | End: 2020-01-13 | Stop reason: HOSPADM

## 2020-01-13 RX ORDER — IMMUNE GLOBULIN INFUSION (HUMAN) 100 MG/ML
25 INJECTION, SOLUTION INTRAVENOUS; SUBCUTANEOUS ONCE
Status: COMPLETED | OUTPATIENT
Start: 2020-01-13 | End: 2020-01-13

## 2020-01-13 RX ORDER — 0.9 % SODIUM CHLORIDE 0.9 %
2 VIAL (ML) INJECTION PRN
Status: CANCELLED | OUTPATIENT
Start: 2020-04-01

## 2020-01-13 RX ORDER — DIPHENHYDRAMINE HCL 25 MG
25 CAPSULE ORAL ONCE
Status: DISCONTINUED | OUTPATIENT
Start: 2020-01-13 | End: 2020-01-13 | Stop reason: HOSPADM

## 2020-01-13 RX ORDER — IMMUNE GLOBULIN INFUSION (HUMAN) 100 MG/ML
25 INJECTION, SOLUTION INTRAVENOUS; SUBCUTANEOUS ONCE
Status: CANCELLED | OUTPATIENT
Start: 2020-04-01

## 2020-01-13 RX ORDER — DIPHENHYDRAMINE HCL 25 MG
25 CAPSULE ORAL ONCE
Status: CANCELLED | OUTPATIENT
Start: 2020-04-01

## 2020-01-13 RX ORDER — ACETAMINOPHEN 325 MG/1
650 TABLET ORAL ONCE
Status: CANCELLED | OUTPATIENT
Start: 2020-04-01

## 2020-01-13 RX ADMIN — IMMUNE GLOBULIN INFUSION (HUMAN) 25 G: 100 INJECTION, SOLUTION INTRAVENOUS; SUBCUTANEOUS at 08:50

## 2020-01-13 RX ADMIN — SODIUM CHLORIDE 250 ML: 9 INJECTION, SOLUTION INTRAVENOUS at 08:49

## 2020-01-13 RX ADMIN — Medication 500 UNITS: at 14:09

## 2020-01-20 ENCOUNTER — HOSPITAL ENCOUNTER (OUTPATIENT)
Dept: CARDIOLOGY | Age: 65
Discharge: HOME | End: 2020-01-20
Attending: INTERNAL MEDICINE

## 2020-01-20 DIAGNOSIS — J39.8 TRACHEOMALACIA, ACQUIRED: ICD-10-CM

## 2020-01-20 PROCEDURE — 94060 EVALUATION OF WHEEZING: CPT

## 2020-01-20 PROCEDURE — 10002801 HB RX 250 W/O HCPCS: Performed by: INTERNAL MEDICINE

## 2020-01-20 PROCEDURE — 94060 EVALUATION OF WHEEZING: CPT | Performed by: INTERNAL MEDICINE

## 2020-01-20 RX ORDER — ALBUTEROL SULFATE 2.5 MG/3ML
2.5 SOLUTION RESPIRATORY (INHALATION) ONCE
Status: COMPLETED | OUTPATIENT
Start: 2020-01-20 | End: 2020-01-20

## 2020-01-20 RX ADMIN — ALBUTEROL SULFATE 2.5 MG: 2.5 SOLUTION RESPIRATORY (INHALATION) at 10:06

## 2020-01-21 ENCOUNTER — E-ADVICE (OUTPATIENT)
Dept: PULMONOLOGY | Age: 65
End: 2020-01-21

## 2020-01-22 ENCOUNTER — TELEPHONE (OUTPATIENT)
Dept: PULMONOLOGY | Age: 65
End: 2020-01-22

## 2020-01-22 ENCOUNTER — OFFICE VISIT (OUTPATIENT)
Dept: NEUROLOGY | Age: 65
End: 2020-01-22

## 2020-01-22 VITALS
HEART RATE: 98 BPM | WEIGHT: 259 LBS | DIASTOLIC BLOOD PRESSURE: 74 MMHG | SYSTOLIC BLOOD PRESSURE: 133 MMHG | BODY MASS INDEX: 44.81 KG/M2

## 2020-01-22 DIAGNOSIS — R25.1 TREMOR: ICD-10-CM

## 2020-01-22 DIAGNOSIS — G31.84 MILD COGNITIVE IMPAIRMENT WITH MEMORY LOSS: ICD-10-CM

## 2020-01-22 DIAGNOSIS — G43.009 MIGRAINE WITHOUT AURA AND WITHOUT STATUS MIGRAINOSUS, NOT INTRACTABLE: Primary | ICD-10-CM

## 2020-01-22 PROCEDURE — 99213 OFFICE O/P EST LOW 20 MIN: CPT | Performed by: PSYCHIATRY & NEUROLOGY

## 2020-01-22 RX ORDER — TIZANIDINE 2 MG/1
TABLET ORAL
Qty: 120 TABLET | Refills: 6 | Status: SHIPPED | OUTPATIENT
Start: 2020-01-22 | End: 2020-05-14 | Stop reason: SDUPTHER

## 2020-01-23 ENCOUNTER — TELEPHONE (OUTPATIENT)
Dept: PULMONOLOGY | Age: 65
End: 2020-01-23

## 2020-01-27 ENCOUNTER — TELEPHONE (OUTPATIENT)
Dept: INTERNAL MEDICINE | Age: 65
End: 2020-01-27

## 2020-01-27 RX ORDER — AZITHROMYCIN 250 MG/1
500 TABLET, FILM COATED ORAL DAILY
Qty: 14 TABLET | Refills: 0 | Status: SHIPPED | OUTPATIENT
Start: 2020-01-27 | End: 2020-05-18 | Stop reason: SDUPTHER

## 2020-01-27 RX ORDER — FUROSEMIDE 40 MG/1
TABLET ORAL
Qty: 300 TABLET | Refills: 0 | Status: SHIPPED | OUTPATIENT
Start: 2020-01-27 | End: 2020-03-26

## 2020-01-29 DIAGNOSIS — I10 ESSENTIAL HYPERTENSION WITH GOAL BLOOD PRESSURE LESS THAN 140/90: ICD-10-CM

## 2020-01-29 RX ORDER — FLUTICASONE PROPIONATE AND SALMETEROL 500; 50 UG/1; UG/1
1 POWDER RESPIRATORY (INHALATION)
Qty: 3 EACH | Refills: 3 | Status: CANCELLED | OUTPATIENT
Start: 2020-01-29

## 2020-01-29 RX ORDER — VALSARTAN 160 MG/1
160 TABLET ORAL DAILY
Qty: 90 TABLET | Refills: 1 | Status: CANCELLED | OUTPATIENT
Start: 2020-01-29

## 2020-01-29 RX ORDER — FLUTICASONE PROPIONATE 50 MCG
1 SPRAY, SUSPENSION (ML) NASAL DAILY
Qty: 48 G | Refills: 3 | Status: CANCELLED | OUTPATIENT
Start: 2020-01-29

## 2020-01-29 RX ORDER — TIZANIDINE 2 MG/1
TABLET ORAL
Qty: 120 TABLET | Refills: 6 | Status: CANCELLED | OUTPATIENT
Start: 2020-01-29

## 2020-01-29 RX ORDER — POTASSIUM CHLORIDE 750 MG/1
20 TABLET, EXTENDED RELEASE ORAL 2 TIMES DAILY
Qty: 360 TABLET | Refills: 3 | Status: CANCELLED | OUTPATIENT
Start: 2020-01-29

## 2020-01-29 RX ORDER — FUROSEMIDE 40 MG/1
TABLET ORAL
Qty: 300 TABLET | Refills: 0 | Status: CANCELLED | OUTPATIENT
Start: 2020-01-29

## 2020-01-29 RX ORDER — PRAZOSIN HYDROCHLORIDE 2 MG/1
2 CAPSULE ORAL NIGHTLY
Qty: 90 CAPSULE | Refills: 3 | Status: CANCELLED | OUTPATIENT
Start: 2020-01-29

## 2020-01-30 RX ORDER — POTASSIUM CITRATE 10 MEQ/1
10 TABLET, EXTENDED RELEASE ORAL
Qty: 30 TABLET | Refills: 2 | Status: SHIPPED | OUTPATIENT
Start: 2020-01-30 | End: 2020-05-11

## 2020-02-06 RX ORDER — VALSARTAN 160 MG/1
160 TABLET ORAL DAILY
Qty: 90 TABLET | Refills: 0 | Status: SHIPPED | OUTPATIENT
Start: 2020-02-06 | End: 2020-04-08 | Stop reason: SDUPTHER

## 2020-02-10 ENCOUNTER — OFFICE VISIT (OUTPATIENT)
Dept: HEMATOLOGY/ONCOLOGY | Age: 65
End: 2020-02-10
Attending: ALLERGY & IMMUNOLOGY

## 2020-02-10 ENCOUNTER — E-ADVICE (OUTPATIENT)
Dept: NEUROLOGY | Age: 65
End: 2020-02-10

## 2020-02-10 ENCOUNTER — E-ADVICE (OUTPATIENT)
Dept: INTERNAL MEDICINE | Age: 65
End: 2020-02-10

## 2020-02-10 DIAGNOSIS — D83.9 COMMON VARIABLE IMMUNODEFICIENCY (CMD): ICD-10-CM

## 2020-02-10 DIAGNOSIS — D80.1 HYPOGAMMAGLOBULINEMIA (CMD): Primary | ICD-10-CM

## 2020-02-10 PROCEDURE — 10002800 HB RX 250 W HCPCS: Performed by: ALLERGY & IMMUNOLOGY

## 2020-02-10 PROCEDURE — A4212 NON CORING NEEDLE OR STYLET: HCPCS

## 2020-02-10 PROCEDURE — 10002807 HB RX 258: Performed by: ALLERGY & IMMUNOLOGY

## 2020-02-10 PROCEDURE — 96366 THER/PROPH/DIAG IV INF ADDON: CPT | Performed by: ALLERGY & IMMUNOLOGY

## 2020-02-10 PROCEDURE — 96365 THER/PROPH/DIAG IV INF INIT: CPT | Performed by: ALLERGY & IMMUNOLOGY

## 2020-02-10 RX ORDER — HEPARIN 100 UNIT/ML
5 SYRINGE INTRAVENOUS PRN
Status: DISCONTINUED | OUTPATIENT
Start: 2020-02-10 | End: 2020-02-10 | Stop reason: HOSPADM

## 2020-02-10 RX ORDER — DIPHENHYDRAMINE HCL 25 MG
25 CAPSULE ORAL ONCE
Status: DISCONTINUED | OUTPATIENT
Start: 2020-02-10 | End: 2020-02-10 | Stop reason: HOSPADM

## 2020-02-10 RX ORDER — DIPHENHYDRAMINE HCL 25 MG
25 CAPSULE ORAL ONCE
Status: CANCELLED | OUTPATIENT
Start: 2020-04-01

## 2020-02-10 RX ORDER — PREDNISONE 20 MG/1
TABLET ORAL
Qty: 2 TABLET | Refills: 11 | Status: SHIPPED
Start: 2020-02-10 | End: 2020-02-10 | Stop reason: CLARIF

## 2020-02-10 RX ORDER — 0.9 % SODIUM CHLORIDE 0.9 %
2 VIAL (ML) INJECTION PRN
Status: DISCONTINUED | OUTPATIENT
Start: 2020-02-10 | End: 2020-02-10 | Stop reason: HOSPADM

## 2020-02-10 RX ORDER — 0.9 % SODIUM CHLORIDE 0.9 %
2 VIAL (ML) INJECTION PRN
Status: CANCELLED | OUTPATIENT
Start: 2020-04-01

## 2020-02-10 RX ORDER — IMMUNE GLOBULIN INFUSION (HUMAN) 100 MG/ML
25 INJECTION, SOLUTION INTRAVENOUS; SUBCUTANEOUS ONCE
Status: CANCELLED | OUTPATIENT
Start: 2020-04-01

## 2020-02-10 RX ORDER — ACETAMINOPHEN 325 MG/1
650 TABLET ORAL ONCE
Status: CANCELLED | OUTPATIENT
Start: 2020-04-01

## 2020-02-10 RX ORDER — IMMUNE GLOBULIN INFUSION (HUMAN) 100 MG/ML
25 INJECTION, SOLUTION INTRAVENOUS; SUBCUTANEOUS ONCE
Status: COMPLETED | OUTPATIENT
Start: 2020-02-10 | End: 2020-02-10

## 2020-02-10 RX ORDER — ACETAMINOPHEN 325 MG/1
650 TABLET ORAL ONCE
Status: DISCONTINUED | OUTPATIENT
Start: 2020-02-10 | End: 2020-02-10 | Stop reason: HOSPADM

## 2020-02-10 RX ORDER — HEPARIN 100 UNIT/ML
5 SYRINGE INTRAVENOUS PRN
Status: CANCELLED | OUTPATIENT
Start: 2020-02-10

## 2020-02-10 RX ADMIN — IMMUNE GLOBULIN INFUSION (HUMAN) 25 G: 100 INJECTION, SOLUTION INTRAVENOUS; SUBCUTANEOUS at 08:51

## 2020-02-10 RX ADMIN — Medication 500 UNITS: at 14:36

## 2020-02-10 RX ADMIN — SODIUM CHLORIDE 250 ML: 9 INJECTION, SOLUTION INTRAVENOUS at 08:25

## 2020-02-10 ASSESSMENT — PAIN SCALES - GENERAL: PAINLEVEL: 0

## 2020-02-13 RX ORDER — ISOSORBIDE DINITRATE 10 MG/1
10 TABLET ORAL 3 TIMES DAILY
Qty: 270 TABLET | Refills: 3 | Status: ON HOLD | OUTPATIENT
Start: 2020-02-13 | End: 2020-06-04 | Stop reason: SDUPTHER

## 2020-02-20 ENCOUNTER — TELEPHONE (OUTPATIENT)
Dept: ALLERGY | Age: 65
End: 2020-02-20

## 2020-02-27 DIAGNOSIS — R60.1 GENERALIZED EDEMA: ICD-10-CM

## 2020-02-27 RX ORDER — POTASSIUM CHLORIDE 750 MG/1
20 TABLET, EXTENDED RELEASE ORAL DAILY
Qty: 60 TABLET | Refills: 3 | Status: SHIPPED | OUTPATIENT
Start: 2020-02-27 | End: 2020-03-05 | Stop reason: SDUPTHER

## 2020-03-04 ENCOUNTER — E-ADVICE (OUTPATIENT)
Dept: INTERNAL MEDICINE | Age: 65
End: 2020-03-04

## 2020-03-04 DIAGNOSIS — R60.1 GENERALIZED EDEMA: ICD-10-CM

## 2020-03-05 RX ORDER — POTASSIUM CHLORIDE 750 MG/1
TABLET, EXTENDED RELEASE ORAL
Qty: 150 TABLET | Refills: 6 | Status: SHIPPED | OUTPATIENT
Start: 2020-03-05 | End: 2020-04-06 | Stop reason: SDUPTHER

## 2020-03-11 ENCOUNTER — APPOINTMENT (OUTPATIENT)
Dept: HEMATOLOGY/ONCOLOGY | Age: 65
End: 2020-03-11
Attending: ALLERGY & IMMUNOLOGY

## 2020-03-12 ENCOUNTER — OFFICE VISIT (OUTPATIENT)
Dept: HEMATOLOGY/ONCOLOGY | Age: 65
End: 2020-03-12
Attending: ALLERGY & IMMUNOLOGY

## 2020-03-12 DIAGNOSIS — D80.1 HYPOGAMMAGLOBULINEMIA (CMD): Primary | ICD-10-CM

## 2020-03-12 DIAGNOSIS — Z95.828 PORT-A-CATH IN PLACE: ICD-10-CM

## 2020-03-12 PROCEDURE — 96366 THER/PROPH/DIAG IV INF ADDON: CPT

## 2020-03-12 PROCEDURE — A4212 NON CORING NEEDLE OR STYLET: HCPCS

## 2020-03-12 PROCEDURE — 10002800 HB RX 250 W HCPCS: Performed by: ALLERGY & IMMUNOLOGY

## 2020-03-12 PROCEDURE — 96365 THER/PROPH/DIAG IV INF INIT: CPT

## 2020-03-12 PROCEDURE — 10002807 HB RX 258: Performed by: ALLERGY & IMMUNOLOGY

## 2020-03-12 RX ORDER — ACETAMINOPHEN 325 MG/1
650 TABLET ORAL ONCE
Status: DISCONTINUED | OUTPATIENT
Start: 2020-03-12 | End: 2020-03-12 | Stop reason: HOSPADM

## 2020-03-12 RX ORDER — HEPARIN 100 UNIT/ML
5 SYRINGE INTRAVENOUS ONCE
Status: COMPLETED | OUTPATIENT
Start: 2020-03-12 | End: 2020-03-12

## 2020-03-12 RX ORDER — ACETAMINOPHEN 325 MG/1
650 TABLET ORAL ONCE
Status: CANCELLED | OUTPATIENT
Start: 2020-04-01

## 2020-03-12 RX ORDER — IMMUNE GLOBULIN INFUSION (HUMAN) 100 MG/ML
25 INJECTION, SOLUTION INTRAVENOUS; SUBCUTANEOUS ONCE
Status: COMPLETED | OUTPATIENT
Start: 2020-03-12 | End: 2020-03-12

## 2020-03-12 RX ORDER — DIPHENHYDRAMINE HCL 25 MG
25 CAPSULE ORAL ONCE
Status: DISCONTINUED | OUTPATIENT
Start: 2020-03-12 | End: 2020-03-12 | Stop reason: HOSPADM

## 2020-03-12 RX ORDER — DIPHENHYDRAMINE HCL 25 MG
25 CAPSULE ORAL ONCE
Status: CANCELLED | OUTPATIENT
Start: 2020-04-01

## 2020-03-12 RX ORDER — IMMUNE GLOBULIN INFUSION (HUMAN) 100 MG/ML
25 INJECTION, SOLUTION INTRAVENOUS; SUBCUTANEOUS ONCE
Status: CANCELLED | OUTPATIENT
Start: 2020-04-01

## 2020-03-12 RX ORDER — 0.9 % SODIUM CHLORIDE 0.9 %
2 VIAL (ML) INJECTION PRN
Status: CANCELLED | OUTPATIENT
Start: 2020-04-01

## 2020-03-12 RX ADMIN — Medication 500 UNITS: at 14:32

## 2020-03-12 RX ADMIN — SODIUM CHLORIDE 250 ML: 9 INJECTION, SOLUTION INTRAVENOUS at 09:04

## 2020-03-12 RX ADMIN — IMMUNE GLOBULIN INFUSION (HUMAN) 25 G: 100 INJECTION, SOLUTION INTRAVENOUS; SUBCUTANEOUS at 09:04

## 2020-03-12 ASSESSMENT — PAIN SCALES - GENERAL: PAINLEVEL: 3-4

## 2020-03-19 ENCOUNTER — APPOINTMENT (OUTPATIENT)
Dept: OPHTHALMOLOGY | Age: 65
End: 2020-03-19

## 2020-03-19 RX ORDER — LEVOTHYROXINE SODIUM 0.05 MG/1
50 TABLET ORAL DAILY
Qty: 90 TABLET | Refills: 0 | Status: SHIPPED | OUTPATIENT
Start: 2020-03-19 | End: 2020-06-23 | Stop reason: SDUPTHER

## 2020-03-23 ENCOUNTER — E-ADVICE (OUTPATIENT)
Dept: INTERNAL MEDICINE | Age: 65
End: 2020-03-23

## 2020-03-23 RX ORDER — CODEINE PHOSPHATE AND GUAIFENESIN 10; 100 MG/5ML; MG/5ML
5 SOLUTION ORAL 3 TIMES DAILY PRN
Qty: 240 ML | Refills: 0 | Status: SHIPPED | OUTPATIENT
Start: 2020-03-23 | End: 2020-06-12 | Stop reason: SDUPTHER

## 2020-03-25 DIAGNOSIS — N18.30 CHRONIC KIDNEY DISEASE, STAGE III (MODERATE) (CMD): Primary | ICD-10-CM

## 2020-03-25 DIAGNOSIS — N18.30 ANEMIA OF CHRONIC RENAL FAILURE, STAGE 3 (MODERATE) (CMD): ICD-10-CM

## 2020-03-25 DIAGNOSIS — D63.1 ANEMIA OF CHRONIC RENAL FAILURE, STAGE 3 (MODERATE) (CMD): ICD-10-CM

## 2020-03-26 ENCOUNTER — E-ADVICE (OUTPATIENT)
Dept: NEUROLOGY | Age: 65
End: 2020-03-26

## 2020-03-26 DIAGNOSIS — R25.1 TREMOR: ICD-10-CM

## 2020-03-26 DIAGNOSIS — G25.3 MYOCLONUS: ICD-10-CM

## 2020-03-26 RX ORDER — GABAPENTIN 100 MG/1
100 CAPSULE ORAL 3 TIMES DAILY
Qty: 360 CAPSULE | Refills: 3 | OUTPATIENT
Start: 2020-03-26

## 2020-03-26 RX ORDER — FUROSEMIDE 40 MG/1
TABLET ORAL
Qty: 300 TABLET | Refills: 0 | Status: SHIPPED | OUTPATIENT
Start: 2020-03-26 | End: 2020-06-10 | Stop reason: ALTCHOICE

## 2020-03-27 RX ORDER — GABAPENTIN 100 MG/1
100 CAPSULE ORAL 3 TIMES DAILY
Qty: 360 CAPSULE | Refills: 3 | Status: SHIPPED | OUTPATIENT
Start: 2020-03-27 | End: 2020-10-30 | Stop reason: SDUPTHER

## 2020-04-04 ENCOUNTER — E-ADVICE (OUTPATIENT)
Dept: PULMONOLOGY | Age: 65
End: 2020-04-04

## 2020-04-04 ENCOUNTER — E-ADVICE (OUTPATIENT)
Dept: NEPHROLOGY | Age: 65
End: 2020-04-04

## 2020-04-06 RX ORDER — POTASSIUM CHLORIDE 750 MG/1
20 TABLET, EXTENDED RELEASE ORAL 2 TIMES DAILY
Qty: 360 TABLET | Refills: 1 | Status: SHIPPED | OUTPATIENT
Start: 2020-04-06 | End: 2020-06-23 | Stop reason: SDUPTHER

## 2020-04-06 RX ORDER — OMEPRAZOLE 40 MG/1
40 CAPSULE, DELAYED RELEASE ORAL 2 TIMES DAILY
Qty: 180 CAPSULE | Refills: 3 | Status: CANCELLED | OUTPATIENT
Start: 2020-04-06

## 2020-04-06 RX ORDER — OMEPRAZOLE 40 MG/1
40 CAPSULE, DELAYED RELEASE ORAL 2 TIMES DAILY
Qty: 180 CAPSULE | Refills: 1 | Status: SHIPPED | OUTPATIENT
Start: 2020-04-06 | End: 2020-09-02 | Stop reason: SDUPTHER

## 2020-04-07 RX ORDER — GABAPENTIN 300 MG/1
300 CAPSULE ORAL NIGHTLY
Qty: 180 CAPSULE | Refills: 3 | Status: SHIPPED | OUTPATIENT
Start: 2020-04-07 | End: 2020-04-08 | Stop reason: SDUPTHER

## 2020-04-07 RX ORDER — POTASSIUM CHLORIDE 750 MG/1
20 TABLET, EXTENDED RELEASE ORAL 2 TIMES DAILY
Qty: 360 TABLET | Refills: 1 | OUTPATIENT
Start: 2020-04-07

## 2020-04-08 RX ORDER — VALSARTAN 160 MG/1
160 TABLET ORAL DAILY
Qty: 90 TABLET | Refills: 3 | Status: SHIPPED | OUTPATIENT
Start: 2020-04-08 | End: 2020-10-30 | Stop reason: SDUPTHER

## 2020-04-08 RX ORDER — AZITHROMYCIN 250 MG/1
1000 TABLET, FILM COATED ORAL
Qty: 8 TABLET | Refills: 0 | Status: CANCELLED | OUTPATIENT
Start: 2020-04-08

## 2020-04-08 RX ORDER — OMEPRAZOLE 40 MG/1
40 CAPSULE, DELAYED RELEASE ORAL 2 TIMES DAILY
Qty: 180 CAPSULE | Refills: 1 | OUTPATIENT
Start: 2020-04-08

## 2020-04-08 RX ORDER — AZITHROMYCIN 250 MG/1
1000 TABLET, FILM COATED ORAL
Qty: 8 TABLET | Refills: 0 | Status: ON HOLD | OUTPATIENT
Start: 2020-04-08 | End: 2020-06-06 | Stop reason: HOSPADM

## 2020-04-08 RX ORDER — POTASSIUM CHLORIDE 750 MG/1
20 TABLET, EXTENDED RELEASE ORAL 2 TIMES DAILY
Qty: 360 TABLET | Refills: 1 | OUTPATIENT
Start: 2020-04-08

## 2020-04-09 ENCOUNTER — OFFICE VISIT (OUTPATIENT)
Dept: HEMATOLOGY/ONCOLOGY | Age: 65
End: 2020-04-09
Attending: ALLERGY & IMMUNOLOGY

## 2020-04-09 ENCOUNTER — LAB SERVICES (OUTPATIENT)
Dept: LAB | Age: 65
End: 2020-04-09
Attending: INTERNAL MEDICINE

## 2020-04-09 DIAGNOSIS — D83.9 COMMON VARIABLE IMMUNODEFICIENCY (CMD): ICD-10-CM

## 2020-04-09 DIAGNOSIS — N18.30 CHRONIC KIDNEY DISEASE, STAGE III (MODERATE) (CMD): ICD-10-CM

## 2020-04-09 DIAGNOSIS — N18.30 ANEMIA OF CHRONIC RENAL FAILURE, STAGE 3 (MODERATE) (CMD): ICD-10-CM

## 2020-04-09 DIAGNOSIS — D80.1 HYPOGAMMAGLOBULINEMIA (CMD): Primary | ICD-10-CM

## 2020-04-09 DIAGNOSIS — D63.1 ANEMIA OF CHRONIC RENAL FAILURE, STAGE 3 (MODERATE) (CMD): ICD-10-CM

## 2020-04-09 LAB
ALBUMIN SERPL-MCNC: 3.5 G/DL (ref 3.6–5.1)
ANION GAP SERPL CALC-SCNC: 17 MMOL/L (ref 10–20)
BUN SERPL-MCNC: 23 MG/DL (ref 6–20)
BUN/CREAT SERPL: 12 (ref 7–25)
CALCIUM SERPL-MCNC: 8 MG/DL (ref 8.4–10.2)
CHLORIDE SERPL-SCNC: 101 MMOL/L (ref 98–107)
CO2 SERPL-SCNC: 22 MMOL/L (ref 21–32)
CREAT SERPL-MCNC: 1.92 MG/DL (ref 0.51–0.95)
DEPRECATED RDW RBC: 43.5 FL (ref 39–50)
ERYTHROCYTE [DISTWIDTH] IN BLOOD: 13.2 % (ref 11–15)
FASTING DURATION TIME PATIENT: ABNORMAL H
GFR SERPLBLD BASED ON 1.73 SQ M-ARVRAT: 27 ML/MIN
GLUCOSE SERPL-MCNC: 179 MG/DL (ref 65–99)
HCT VFR BLD CALC: 34.1 % (ref 36–46.5)
HGB BLD-MCNC: 11.2 G/DL (ref 12–15.5)
MCH RBC QN AUTO: 30.8 PG (ref 26–34)
MCHC RBC AUTO-ENTMCNC: 32.8 G/DL (ref 32–36.5)
MCV RBC AUTO: 93.7 FL (ref 78–100)
PHOSPHATE SERPL-MCNC: 4.8 MG/DL (ref 2.4–4.7)
PLATELET # BLD AUTO: 215 K/MCL (ref 140–450)
POTASSIUM SERPL-SCNC: 3.7 MMOL/L (ref 3.4–5.1)
RBC # BLD: 3.64 MIL/MCL (ref 4–5.2)
SODIUM SERPL-SCNC: 136 MMOL/L (ref 135–145)
WBC # BLD: 6.5 K/MCL (ref 4.2–11)

## 2020-04-09 PROCEDURE — 85027 COMPLETE CBC AUTOMATED: CPT

## 2020-04-09 PROCEDURE — A4212 NON CORING NEEDLE OR STYLET: HCPCS

## 2020-04-09 PROCEDURE — 80069 RENAL FUNCTION PANEL: CPT

## 2020-04-09 PROCEDURE — 10002807 HB RX 258: Performed by: ALLERGY & IMMUNOLOGY

## 2020-04-09 PROCEDURE — 96365 THER/PROPH/DIAG IV INF INIT: CPT

## 2020-04-09 PROCEDURE — 96366 THER/PROPH/DIAG IV INF ADDON: CPT

## 2020-04-09 PROCEDURE — 10002800 HB RX 250 W HCPCS: Performed by: ALLERGY & IMMUNOLOGY

## 2020-04-09 RX ORDER — HEPARIN 100 UNIT/ML
5 SYRINGE INTRAVENOUS PRN
Status: DISCONTINUED | OUTPATIENT
Start: 2020-04-09 | End: 2020-04-09 | Stop reason: HOSPADM

## 2020-04-09 RX ORDER — 0.9 % SODIUM CHLORIDE 0.9 %
2 VIAL (ML) INJECTION PRN
Status: DISCONTINUED | OUTPATIENT
Start: 2020-04-09 | End: 2020-04-09 | Stop reason: HOSPADM

## 2020-04-09 RX ORDER — IMMUNE GLOBULIN INFUSION (HUMAN) 100 MG/ML
25 INJECTION, SOLUTION INTRAVENOUS; SUBCUTANEOUS ONCE
Status: CANCELLED | OUTPATIENT
Start: 2020-07-01

## 2020-04-09 RX ORDER — DIPHENHYDRAMINE HCL 25 MG
25 CAPSULE ORAL ONCE
Status: CANCELLED | OUTPATIENT
Start: 2020-07-01

## 2020-04-09 RX ORDER — GABAPENTIN 300 MG/1
300 CAPSULE ORAL NIGHTLY
Qty: 180 CAPSULE | Refills: 3 | Status: SHIPPED | OUTPATIENT
Start: 2020-04-09 | End: 2020-10-30 | Stop reason: SDUPTHER

## 2020-04-09 RX ORDER — ACETAMINOPHEN 325 MG/1
650 TABLET ORAL ONCE
Status: CANCELLED | OUTPATIENT
Start: 2020-07-01

## 2020-04-09 RX ORDER — ACETAMINOPHEN 325 MG/1
650 TABLET ORAL ONCE
Status: DISCONTINUED | OUTPATIENT
Start: 2020-04-09 | End: 2020-04-09 | Stop reason: HOSPADM

## 2020-04-09 RX ORDER — 0.9 % SODIUM CHLORIDE 0.9 %
2 VIAL (ML) INJECTION PRN
Status: CANCELLED | OUTPATIENT
Start: 2020-07-01

## 2020-04-09 RX ORDER — DIPHENHYDRAMINE HCL 25 MG
25 CAPSULE ORAL ONCE
Status: DISCONTINUED | OUTPATIENT
Start: 2020-04-09 | End: 2020-04-09 | Stop reason: HOSPADM

## 2020-04-09 RX ORDER — HEPARIN 100 UNIT/ML
5 SYRINGE INTRAVENOUS PRN
Status: CANCELLED | OUTPATIENT
Start: 2020-04-09

## 2020-04-09 RX ORDER — IMMUNE GLOBULIN INFUSION (HUMAN) 100 MG/ML
25 INJECTION, SOLUTION INTRAVENOUS; SUBCUTANEOUS ONCE
Status: COMPLETED | OUTPATIENT
Start: 2020-04-09 | End: 2020-04-09

## 2020-04-09 RX ADMIN — Medication 500 UNITS: at 12:16

## 2020-04-09 RX ADMIN — SODIUM CHLORIDE 250 ML: 9 INJECTION, SOLUTION INTRAVENOUS at 08:40

## 2020-04-09 RX ADMIN — IMMUNE GLOBULIN INFUSION (HUMAN) 25 G: 100 INJECTION, SOLUTION INTRAVENOUS; SUBCUTANEOUS at 09:21

## 2020-04-09 ASSESSMENT — PAIN SCALES - GENERAL: PAINLEVEL: 0

## 2020-04-10 RX ORDER — BUTALBITAL, ACETAMINOPHEN AND CAFFEINE 50; 325; 40 MG/1; MG/1; MG/1
1 TABLET ORAL EVERY 4 HOURS PRN
Qty: 30 TABLET | Refills: 3 | Status: SHIPPED | OUTPATIENT
Start: 2020-04-10 | End: 2020-10-30 | Stop reason: SDUPTHER

## 2020-04-14 ENCOUNTER — TELEPHONE (OUTPATIENT)
Dept: INTERNAL MEDICINE | Age: 65
End: 2020-04-14

## 2020-04-14 ENCOUNTER — V-VISIT (OUTPATIENT)
Dept: PULMONOLOGY | Age: 65
End: 2020-04-14

## 2020-04-14 ENCOUNTER — TELEPHONE (OUTPATIENT)
Dept: PULMONOLOGY | Age: 65
End: 2020-04-14

## 2020-04-14 DIAGNOSIS — J01.00 ACUTE NON-RECURRENT MAXILLARY SINUSITIS: ICD-10-CM

## 2020-04-14 DIAGNOSIS — J44.89 COPD WITH ASTHMA: Primary | ICD-10-CM

## 2020-04-14 PROCEDURE — G2012 BRIEF CHECK IN BY MD/QHP: HCPCS | Performed by: INTERNAL MEDICINE

## 2020-04-14 RX ORDER — LEVOFLOXACIN 750 MG/1
750 TABLET, FILM COATED ORAL DAILY
Qty: 10 TABLET | Refills: 0 | Status: SHIPPED | OUTPATIENT
Start: 2020-04-14 | End: 2020-06-09 | Stop reason: SDUPTHER

## 2020-04-17 ENCOUNTER — E-ADVICE (OUTPATIENT)
Dept: NEPHROLOGY | Age: 65
End: 2020-04-17

## 2020-04-20 ENCOUNTER — E-ADVICE (OUTPATIENT)
Dept: INTERNAL MEDICINE | Age: 65
End: 2020-04-20

## 2020-04-20 RX ORDER — FLUCONAZOLE 100 MG/1
100 TABLET ORAL DAILY
Qty: 7 TABLET | Refills: 0 | Status: SHIPPED | OUTPATIENT
Start: 2020-04-20 | End: 2020-05-26 | Stop reason: SDUPTHER

## 2020-04-20 RX ORDER — FLUTICASONE PROPIONATE AND SALMETEROL 500; 50 UG/1; UG/1
1 POWDER RESPIRATORY (INHALATION)
Qty: 180 EACH | Refills: 3 | Status: SHIPPED | OUTPATIENT
Start: 2020-04-20 | End: 2020-10-30 | Stop reason: SDUPTHER

## 2020-04-20 RX ORDER — FLUTICASONE PROPIONATE AND SALMETEROL 50; 500 UG/1; UG/1
POWDER RESPIRATORY (INHALATION) 2 TIMES DAILY
Qty: 180 EACH | Refills: 1 | Status: CANCELLED | OUTPATIENT
Start: 2020-04-20

## 2020-04-21 ENCOUNTER — E-ADVICE (OUTPATIENT)
Dept: INTERNAL MEDICINE | Age: 65
End: 2020-04-21

## 2020-04-21 RX ORDER — PREDNISONE 20 MG/1
TABLET ORAL
Qty: 20 TABLET | Refills: 1 | Status: SHIPPED | OUTPATIENT
Start: 2020-04-21 | End: 2020-05-20 | Stop reason: ALTCHOICE

## 2020-04-22 DIAGNOSIS — E11.9 TYPE 2 DIABETES MELLITUS WITHOUT COMPLICATION, WITHOUT LONG-TERM CURRENT USE OF INSULIN (CMD): ICD-10-CM

## 2020-05-01 ENCOUNTER — E-ADVICE (OUTPATIENT)
Dept: INTERNAL MEDICINE | Age: 65
End: 2020-05-01

## 2020-05-01 DIAGNOSIS — R25.2 LEG CRAMPS: Primary | ICD-10-CM

## 2020-05-07 ENCOUNTER — OFFICE VISIT (OUTPATIENT)
Dept: HEMATOLOGY/ONCOLOGY | Age: 65
End: 2020-05-07
Attending: ALLERGY & IMMUNOLOGY

## 2020-05-07 ENCOUNTER — LAB SERVICES (OUTPATIENT)
Dept: LAB | Age: 65
End: 2020-05-07
Attending: INTERNAL MEDICINE

## 2020-05-07 ENCOUNTER — E-ADVICE (OUTPATIENT)
Dept: INTERNAL MEDICINE | Age: 65
End: 2020-05-07

## 2020-05-07 DIAGNOSIS — D83.9 COMMON VARIABLE IMMUNODEFICIENCY (CMD): ICD-10-CM

## 2020-05-07 DIAGNOSIS — R25.2 LEG CRAMPS: ICD-10-CM

## 2020-05-07 DIAGNOSIS — D80.1 HYPOGAMMAGLOBULINEMIA (CMD): ICD-10-CM

## 2020-05-07 DIAGNOSIS — D80.1 HYPOGAMMAGLOBULINEMIA (CMD): Primary | ICD-10-CM

## 2020-05-07 LAB
ANION GAP SERPL CALC-SCNC: 11 MMOL/L (ref 10–20)
BUN SERPL-MCNC: 24 MG/DL (ref 6–20)
BUN/CREAT SERPL: 15 (ref 7–25)
CALCIUM SERPL-MCNC: 8.4 MG/DL (ref 8.4–10.2)
CHLORIDE SERPL-SCNC: 100 MMOL/L (ref 98–107)
CO2 SERPL-SCNC: 28 MMOL/L (ref 21–32)
CREAT SERPL-MCNC: 1.56 MG/DL (ref 0.51–0.95)
FASTING DURATION TIME PATIENT: ABNORMAL H
GFR SERPLBLD BASED ON 1.73 SQ M-ARVRAT: 35 ML/MIN
GLUCOSE SERPL-MCNC: 130 MG/DL (ref 65–99)
MAGNESIUM SERPL-MCNC: 2.3 MG/DL (ref 1.7–2.4)
POTASSIUM SERPL-SCNC: 4.1 MMOL/L (ref 3.4–5.1)
SODIUM SERPL-SCNC: 135 MMOL/L (ref 135–145)

## 2020-05-07 PROCEDURE — 82784 ASSAY IGA/IGD/IGG/IGM EACH: CPT

## 2020-05-07 PROCEDURE — 10002800 HB RX 250 W HCPCS: Performed by: ALLERGY & IMMUNOLOGY

## 2020-05-07 PROCEDURE — 10002807 HB RX 258: Performed by: ALLERGY & IMMUNOLOGY

## 2020-05-07 PROCEDURE — 83735 ASSAY OF MAGNESIUM: CPT

## 2020-05-07 PROCEDURE — A4212 NON CORING NEEDLE OR STYLET: HCPCS

## 2020-05-07 PROCEDURE — 96366 THER/PROPH/DIAG IV INF ADDON: CPT

## 2020-05-07 PROCEDURE — 80048 BASIC METABOLIC PNL TOTAL CA: CPT

## 2020-05-07 PROCEDURE — 96365 THER/PROPH/DIAG IV INF INIT: CPT

## 2020-05-07 RX ORDER — DICYCLOMINE HYDROCHLORIDE 10 MG/1
10 CAPSULE ORAL
Qty: 90 CAPSULE | Refills: 6 | Status: SHIPPED | OUTPATIENT
Start: 2020-05-07 | End: 2020-11-12 | Stop reason: SDUPTHER

## 2020-05-07 RX ORDER — DIPHENHYDRAMINE HCL 25 MG
25 CAPSULE ORAL ONCE
Status: CANCELLED | OUTPATIENT
Start: 2020-07-01

## 2020-05-07 RX ORDER — IMMUNE GLOBULIN INFUSION (HUMAN) 100 MG/ML
25 INJECTION, SOLUTION INTRAVENOUS; SUBCUTANEOUS ONCE
Status: COMPLETED | OUTPATIENT
Start: 2020-05-07 | End: 2020-05-07

## 2020-05-07 RX ORDER — IMMUNE GLOBULIN INFUSION (HUMAN) 100 MG/ML
25 INJECTION, SOLUTION INTRAVENOUS; SUBCUTANEOUS ONCE
Status: CANCELLED | OUTPATIENT
Start: 2020-07-01

## 2020-05-07 RX ORDER — HEPARIN 100 UNIT/ML
5 SYRINGE INTRAVENOUS PRN
Status: DISCONTINUED | OUTPATIENT
Start: 2020-05-07 | End: 2020-05-19 | Stop reason: HOSPADM

## 2020-05-07 RX ORDER — 0.9 % SODIUM CHLORIDE 0.9 %
2 VIAL (ML) INJECTION PRN
Status: CANCELLED | OUTPATIENT
Start: 2020-07-01

## 2020-05-07 RX ORDER — HEPARIN 100 UNIT/ML
5 SYRINGE INTRAVENOUS PRN
Status: CANCELLED | OUTPATIENT
Start: 2020-05-07

## 2020-05-07 RX ORDER — ACETAMINOPHEN 325 MG/1
650 TABLET ORAL ONCE
Status: CANCELLED | OUTPATIENT
Start: 2020-07-01

## 2020-05-07 RX ADMIN — IMMUNE GLOBULIN INFUSION (HUMAN) 25 G: 100 INJECTION, SOLUTION INTRAVENOUS; SUBCUTANEOUS at 09:59

## 2020-05-07 RX ADMIN — SODIUM CHLORIDE 250 ML: 9 INJECTION, SOLUTION INTRAVENOUS at 09:50

## 2020-05-07 RX ADMIN — Medication 500 UNITS: at 12:54

## 2020-05-07 ASSESSMENT — PAIN SCALES - GENERAL: PAINLEVEL: 0

## 2020-05-08 ENCOUNTER — E-ADVICE (OUTPATIENT)
Dept: ALLERGY | Age: 65
End: 2020-05-08

## 2020-05-08 LAB
IGA SERPL-MCNC: 202 MG/DL (ref 82–453)
IGG SERPL-MCNC: 873 MG/DL (ref 751–1560)
IGM SERPL-MCNC: 42 MG/DL (ref 46–304)

## 2020-05-11 ENCOUNTER — TELEPHONE (OUTPATIENT)
Dept: CARDIOLOGY | Age: 65
End: 2020-05-11

## 2020-05-11 RX ORDER — POTASSIUM CITRATE 10 MEQ/1
10 TABLET, EXTENDED RELEASE ORAL
Qty: 30 TABLET | Refills: 2 | Status: SHIPPED | OUTPATIENT
Start: 2020-05-11 | End: 2020-08-13 | Stop reason: SDUPTHER

## 2020-05-13 ENCOUNTER — E-ADVICE (OUTPATIENT)
Dept: NEUROLOGY | Age: 65
End: 2020-05-13

## 2020-05-14 ENCOUNTER — E-ADVICE (OUTPATIENT)
Dept: NEPHROLOGY | Age: 65
End: 2020-05-14

## 2020-05-14 RX ORDER — TIZANIDINE 2 MG/1
TABLET ORAL
Qty: 120 TABLET | Refills: 6 | Status: SHIPPED | OUTPATIENT
Start: 2020-05-14 | End: 2020-10-30 | Stop reason: SDUPTHER

## 2020-05-18 ENCOUNTER — TELEPHONE (OUTPATIENT)
Dept: INTERNAL MEDICINE | Age: 65
End: 2020-05-18

## 2020-05-18 RX ORDER — AZITHROMYCIN 250 MG/1
500 TABLET, FILM COATED ORAL DAILY
Qty: 14 TABLET | Refills: 0 | Status: SHIPPED | OUTPATIENT
Start: 2020-05-18 | End: 2020-05-25

## 2020-05-20 ENCOUNTER — V-VISIT (OUTPATIENT)
Dept: ALLERGY | Age: 65
End: 2020-05-20

## 2020-05-20 DIAGNOSIS — J44.89 ASTHMA-COPD OVERLAP SYNDROME: ICD-10-CM

## 2020-05-20 DIAGNOSIS — D83.9 CVID (COMMON VARIABLE IMMUNODEFICIENCY) (CMD): Primary | ICD-10-CM

## 2020-05-20 DIAGNOSIS — J32.4 CHRONIC PANSINUSITIS: ICD-10-CM

## 2020-05-20 PROCEDURE — 99214 OFFICE O/P EST MOD 30 MIN: CPT | Performed by: ALLERGY & IMMUNOLOGY

## 2020-05-20 RX ORDER — PREDNISONE 20 MG/1
TABLET ORAL
Qty: 12 TABLET | Refills: 11 | Status: ON HOLD | OUTPATIENT
Start: 2020-05-20 | End: 2020-06-06 | Stop reason: HOSPADM

## 2020-05-25 ENCOUNTER — E-ADVICE (OUTPATIENT)
Dept: INTERNAL MEDICINE | Age: 65
End: 2020-05-25

## 2020-05-26 ENCOUNTER — E-ADVICE (OUTPATIENT)
Dept: NEPHROLOGY | Age: 65
End: 2020-05-26

## 2020-05-26 RX ORDER — FLUCONAZOLE 100 MG/1
100 TABLET ORAL DAILY
Qty: 7 TABLET | Refills: 0 | Status: ON HOLD | OUTPATIENT
Start: 2020-05-26 | End: 2020-06-06 | Stop reason: HOSPADM

## 2020-05-27 ENCOUNTER — E-ADVICE (OUTPATIENT)
Dept: NEPHROLOGY | Age: 65
End: 2020-05-27

## 2020-06-01 ENCOUNTER — OFFICE VISIT (OUTPATIENT)
Dept: CHIROPRACTIC MEDICINE | Age: 65
End: 2020-06-01

## 2020-06-01 DIAGNOSIS — M99.02 THORACIC REGION SOMATIC DYSFUNCTION: Primary | ICD-10-CM

## 2020-06-01 DIAGNOSIS — M99.01 SEGMENTAL AND SOMATIC DYSFUNCTION OF CERVICAL REGION: ICD-10-CM

## 2020-06-01 DIAGNOSIS — M54.6 PAIN IN THORACIC SPINE: ICD-10-CM

## 2020-06-01 DIAGNOSIS — M54.2 CERVICALGIA: ICD-10-CM

## 2020-06-01 PROCEDURE — 98940 CHIROPRACT MANJ 1-2 REGIONS: CPT | Performed by: CHIROPRACTOR

## 2020-06-04 ENCOUNTER — HOSPITAL ENCOUNTER (OUTPATIENT)
Age: 65
Setting detail: OBSERVATION
Discharge: HOME | End: 2020-06-06
Attending: EMERGENCY MEDICINE | Admitting: HOSPITALIST

## 2020-06-04 ENCOUNTER — APPOINTMENT (OUTPATIENT)
Dept: GENERAL RADIOLOGY | Age: 65
End: 2020-06-04
Attending: EMERGENCY MEDICINE

## 2020-06-04 ENCOUNTER — OFFICE VISIT (OUTPATIENT)
Dept: HEMATOLOGY/ONCOLOGY | Age: 65
End: 2020-06-04
Attending: INTERNAL MEDICINE

## 2020-06-04 ENCOUNTER — APPOINTMENT (OUTPATIENT)
Dept: CT IMAGING | Age: 65
End: 2020-06-04
Attending: EMERGENCY MEDICINE

## 2020-06-04 ENCOUNTER — APPOINTMENT (OUTPATIENT)
Dept: LAB | Age: 65
End: 2020-06-04
Attending: ALLERGY & IMMUNOLOGY

## 2020-06-04 ENCOUNTER — TELEPHONE (OUTPATIENT)
Dept: ALLERGY | Age: 65
End: 2020-06-04

## 2020-06-04 DIAGNOSIS — R06.02 SHORTNESS OF BREATH: ICD-10-CM

## 2020-06-04 DIAGNOSIS — D80.1 HYPOGAMMAGLOBULINEMIA (CMD): Primary | ICD-10-CM

## 2020-06-04 DIAGNOSIS — E87.1 HYPONATREMIA: Primary | ICD-10-CM

## 2020-06-04 DIAGNOSIS — D83.9 COMMON VARIABLE IMMUNODEFICIENCY (CMD): ICD-10-CM

## 2020-06-04 LAB
ALBUMIN SERPL-MCNC: 4 G/DL (ref 3.6–5.1)
ALBUMIN/GLOB SERPL: 1.1 {RATIO} (ref 1–2.4)
ALP SERPL-CCNC: 74 UNITS/L (ref 45–117)
ALT SERPL-CCNC: 27 UNITS/L
ANION GAP SERPL CALC-SCNC: 10 MMOL/L (ref 10–20)
ANION GAP SERPL CALC-SCNC: 13 MMOL/L (ref 10–20)
APPEARANCE UR: CLEAR
APTT PPP: 36 SEC (ref 22–32)
AST SERPL-CCNC: 29 UNITS/L
ATRIAL RATE (BPM): 87
BACTERIA #/AREA URNS HPF: NORMAL /HPF
BASOPHILS # BLD: 0 K/MCL (ref 0–0.3)
BASOPHILS NFR BLD: 0 %
BILIRUB SERPL-MCNC: 0.4 MG/DL (ref 0.2–1)
BILIRUB UR QL STRIP: NEGATIVE
BUN SERPL-MCNC: 20 MG/DL (ref 6–20)
BUN SERPL-MCNC: 21 MG/DL (ref 6–20)
BUN/CREAT SERPL: 13 (ref 7–25)
BUN/CREAT SERPL: 14 (ref 7–25)
CALCIUM SERPL-MCNC: 8.4 MG/DL (ref 8.4–10.2)
CALCIUM SERPL-MCNC: 8.5 MG/DL (ref 8.4–10.2)
CHLORIDE SERPL-SCNC: 88 MMOL/L (ref 98–107)
CHLORIDE SERPL-SCNC: 89 MMOL/L (ref 98–107)
CO2 SERPL-SCNC: 25 MMOL/L (ref 21–32)
CO2 SERPL-SCNC: 31 MMOL/L (ref 21–32)
COLOR UR: ABNORMAL
CREAT SERPL-MCNC: 1.49 MG/DL (ref 0.51–0.95)
CREAT SERPL-MCNC: 1.53 MG/DL (ref 0.51–0.95)
DEPRECATED RDW RBC: 38.5 FL (ref 39–50)
DIASTOLIC BLOOD PRESSURE: 84
EOSINOPHIL # BLD: 0 K/MCL (ref 0.1–0.5)
EOSINOPHIL NFR BLD: 0 %
ERYTHROCYTE [DISTWIDTH] IN BLOOD: 11.9 % (ref 11–15)
FASTING DURATION TIME PATIENT: ABNORMAL H
FASTING DURATION TIME PATIENT: ABNORMAL H
GFR SERPLBLD BASED ON 1.73 SQ M-ARVRAT: 36 ML/MIN
GFR SERPLBLD BASED ON 1.73 SQ M-ARVRAT: 37 ML/MIN
GLOBULIN SER-MCNC: 3.8 G/DL (ref 2–4)
GLUCOSE BLDC GLUCOMTR-MCNC: 130 MG/DL (ref 70–99)
GLUCOSE SERPL-MCNC: 138 MG/DL (ref 65–99)
GLUCOSE SERPL-MCNC: 177 MG/DL (ref 65–99)
GLUCOSE UR STRIP-MCNC: ABNORMAL MG/DL
HCT VFR BLD CALC: 33 % (ref 36–46.5)
HGB BLD-MCNC: 11.2 G/DL (ref 12–15.5)
HGB UR QL STRIP: ABNORMAL
HYALINE CASTS #/AREA URNS LPF: NORMAL /LPF
IMM GRANULOCYTES # BLD AUTO: 0 K/MCL (ref 0–0.2)
IMM GRANULOCYTES # BLD: 1 %
INR PPP: 1
KETONES UR STRIP-MCNC: NEGATIVE MG/DL
LACTATE BLDV-SCNC: 1.8 MMOL/L (ref 0–2)
LEUKOCYTE ESTERASE UR QL STRIP: NEGATIVE
LYMPHOCYTES # BLD: 0.4 K/MCL (ref 1–4)
LYMPHOCYTES NFR BLD: 10 %
MCH RBC QN AUTO: 30 PG (ref 26–34)
MCHC RBC AUTO-ENTMCNC: 33.9 G/DL (ref 32–36.5)
MCV RBC AUTO: 88.5 FL (ref 78–100)
MONOCYTES # BLD: 0.1 K/MCL (ref 0.3–0.9)
MONOCYTES NFR BLD: 3 %
NEUTROPHILS # BLD: 3.2 K/MCL (ref 1.8–7.7)
NEUTROPHILS NFR BLD: 86 %
NITRITE UR QL STRIP: NEGATIVE
NRBC BLD MANUAL-RTO: 0 /100 WBC
NT-PROBNP SERPL-MCNC: 463 PG/ML
OSMOLALITY SERPL: 266 MOSM/KG (ref 275–300)
OSMOLALITY UR: 162 MOSM/KG (ref 50–1200)
P AXIS (DEGREES): 52
PH UR STRIP: 7 [PH] (ref 5–7)
PLATELET # BLD AUTO: 223 K/MCL (ref 140–450)
POTASSIUM SERPL-SCNC: 4.2 MMOL/L (ref 3.4–5.1)
POTASSIUM SERPL-SCNC: 5 MMOL/L (ref 3.4–5.1)
PR-INTERVAL (MSEC): 226
PROT SERPL-MCNC: 7.8 G/DL (ref 6.4–8.2)
PROT UR STRIP-MCNC: NEGATIVE MG/DL
PROTHROMBIN TIME: 10.4 SEC (ref 9.7–11.8)
QRS-INTERVAL (MSEC): 80
QT-INTERVAL (MSEC): 352
QTC: 423
R AXIS (DEGREES): 9
RAINBOW EXTRA TUBES HOLD SPECIMEN: NORMAL
RBC # BLD: 3.73 MIL/MCL (ref 4–5.2)
RBC #/AREA URNS HPF: NORMAL /HPF
REPORT TEXT: NORMAL
SARS-COV-2 RNA RESP QL NAA+PROBE: NOT DETECTED
SERVICE CMNT-IMP: NORMAL
SODIUM SERPL-SCNC: 121 MMOL/L (ref 135–145)
SODIUM SERPL-SCNC: 126 MMOL/L (ref 135–145)
SODIUM UR-SCNC: 23 MMOL/L
SP GR UR STRIP: <1.005 (ref 1–1.03)
SQUAMOUS #/AREA URNS HPF: NORMAL /HPF
SYSTOLIC BLOOD PRESSURE: 193
T AXIS (DEGREES): 45
TROPONIN I SERPL HS-MCNC: <0.02 NG/ML
UROBILINOGEN UR STRIP-MCNC: 0.2 MG/DL
VENTRICULAR RATE EKG/MIN (BPM): 87
WBC # BLD: 3.7 K/MCL (ref 4.2–11)
WBC #/AREA URNS HPF: NORMAL /HPF

## 2020-06-04 PROCEDURE — 71250 CT THORAX DX C-: CPT | Performed by: RADIOLOGY

## 2020-06-04 PROCEDURE — 71045 X-RAY EXAM CHEST 1 VIEW: CPT

## 2020-06-04 PROCEDURE — G0378 HOSPITAL OBSERVATION PER HR: HCPCS

## 2020-06-04 PROCEDURE — 10002803 HB RX 637

## 2020-06-04 PROCEDURE — 93005 ELECTROCARDIOGRAM TRACING: CPT | Performed by: EMERGENCY MEDICINE

## 2020-06-04 PROCEDURE — 10002803 HB RX 637: Performed by: HOSPITALIST

## 2020-06-04 PROCEDURE — 84145 PROCALCITONIN (PCT): CPT | Performed by: HOSPITALIST

## 2020-06-04 PROCEDURE — 10002800 HB RX 250 W HCPCS: Performed by: HOSPITALIST

## 2020-06-04 PROCEDURE — 84484 ASSAY OF TROPONIN QUANT: CPT | Performed by: EMERGENCY MEDICINE

## 2020-06-04 PROCEDURE — 93010 ELECTROCARDIOGRAM REPORT: CPT | Performed by: INTERNAL MEDICINE

## 2020-06-04 PROCEDURE — 10002807 HB RX 258: Performed by: ALLERGY & IMMUNOLOGY

## 2020-06-04 PROCEDURE — 96365 THER/PROPH/DIAG IV INF INIT: CPT

## 2020-06-04 PROCEDURE — 83605 ASSAY OF LACTIC ACID: CPT | Performed by: EMERGENCY MEDICINE

## 2020-06-04 PROCEDURE — 99220 INITIAL OBSERVATION CARE,LEVL III: CPT | Performed by: HOSPITALIST

## 2020-06-04 PROCEDURE — 83880 ASSAY OF NATRIURETIC PEPTIDE: CPT | Performed by: EMERGENCY MEDICINE

## 2020-06-04 PROCEDURE — 71250 CT THORAX DX C-: CPT

## 2020-06-04 PROCEDURE — 85610 PROTHROMBIN TIME: CPT | Performed by: EMERGENCY MEDICINE

## 2020-06-04 PROCEDURE — 85025 COMPLETE CBC W/AUTO DIFF WBC: CPT | Performed by: EMERGENCY MEDICINE

## 2020-06-04 PROCEDURE — 99285 EMERGENCY DEPT VISIT HI MDM: CPT

## 2020-06-04 PROCEDURE — 80053 COMPREHEN METABOLIC PANEL: CPT | Performed by: EMERGENCY MEDICINE

## 2020-06-04 PROCEDURE — 85730 THROMBOPLASTIN TIME PARTIAL: CPT | Performed by: EMERGENCY MEDICINE

## 2020-06-04 PROCEDURE — 10002800 HB RX 250 W HCPCS: Performed by: ALLERGY & IMMUNOLOGY

## 2020-06-04 PROCEDURE — 83935 ASSAY OF URINE OSMOLALITY: CPT | Performed by: HOSPITALIST

## 2020-06-04 PROCEDURE — 81001 URINALYSIS AUTO W/SCOPE: CPT | Performed by: EMERGENCY MEDICINE

## 2020-06-04 PROCEDURE — 96374 THER/PROPH/DIAG INJ IV PUSH: CPT

## 2020-06-04 PROCEDURE — 83930 ASSAY OF BLOOD OSMOLALITY: CPT | Performed by: HOSPITALIST

## 2020-06-04 PROCEDURE — 82962 GLUCOSE BLOOD TEST: CPT

## 2020-06-04 PROCEDURE — 10004651 HB RX, NO CHARGE ITEM: Performed by: HOSPITALIST

## 2020-06-04 PROCEDURE — 10002800 HB RX 250 W HCPCS: Performed by: EMERGENCY MEDICINE

## 2020-06-04 PROCEDURE — 84300 ASSAY OF URINE SODIUM: CPT | Performed by: HOSPITALIST

## 2020-06-04 PROCEDURE — 96372 THER/PROPH/DIAG INJ SC/IM: CPT | Performed by: HOSPITALIST

## 2020-06-04 PROCEDURE — 99285 EMERGENCY DEPT VISIT HI MDM: CPT | Performed by: EMERGENCY MEDICINE

## 2020-06-04 PROCEDURE — 36415 COLL VENOUS BLD VENIPUNCTURE: CPT

## 2020-06-04 PROCEDURE — 96366 THER/PROPH/DIAG IV INF ADDON: CPT

## 2020-06-04 PROCEDURE — 10002801 HB RX 250 W/O HCPCS: Performed by: HOSPITALIST

## 2020-06-04 PROCEDURE — 87635 SARS-COV-2 COVID-19 AMP PRB: CPT | Performed by: EMERGENCY MEDICINE

## 2020-06-04 PROCEDURE — A4212 NON CORING NEEDLE OR STYLET: HCPCS

## 2020-06-04 PROCEDURE — 80048 BASIC METABOLIC PNL TOTAL CA: CPT | Performed by: HOSPITALIST

## 2020-06-04 PROCEDURE — 71045 X-RAY EXAM CHEST 1 VIEW: CPT | Performed by: RADIOLOGY

## 2020-06-04 RX ORDER — 0.9 % SODIUM CHLORIDE 0.9 %
2 VIAL (ML) INJECTION EVERY 12 HOURS SCHEDULED
Status: DISCONTINUED | OUTPATIENT
Start: 2020-06-04 | End: 2020-06-06 | Stop reason: HOSPADM

## 2020-06-04 RX ORDER — FAMOTIDINE 20 MG/1
20 TABLET, FILM COATED ORAL 2 TIMES DAILY
Status: DISCONTINUED | OUTPATIENT
Start: 2020-06-04 | End: 2020-06-04 | Stop reason: SDUPTHER

## 2020-06-04 RX ORDER — FUROSEMIDE 10 MG/ML
40 INJECTION INTRAMUSCULAR; INTRAVENOUS ONCE
Status: COMPLETED | OUTPATIENT
Start: 2020-06-04 | End: 2020-06-04

## 2020-06-04 RX ORDER — FLUTICASONE FUROATE AND VILANTEROL 200; 25 UG/1; UG/1
1 POWDER RESPIRATORY (INHALATION)
Status: DISCONTINUED | OUTPATIENT
Start: 2020-06-05 | End: 2020-06-06 | Stop reason: HOSPADM

## 2020-06-04 RX ORDER — FUROSEMIDE 10 MG/ML
20 INJECTION INTRAMUSCULAR; INTRAVENOUS DAILY
Status: DISCONTINUED | OUTPATIENT
Start: 2020-06-05 | End: 2020-06-06 | Stop reason: HOSPADM

## 2020-06-04 RX ORDER — DEXTROSE MONOHYDRATE 25 G/50ML
12.5 INJECTION, SOLUTION INTRAVENOUS PRN
Status: DISCONTINUED | OUTPATIENT
Start: 2020-06-04 | End: 2020-06-06 | Stop reason: HOSPADM

## 2020-06-04 RX ORDER — VITAMIN B COMPLEX
2000 TABLET ORAL EVERY EVENING
Status: DISCONTINUED | OUTPATIENT
Start: 2020-06-04 | End: 2020-06-06 | Stop reason: HOSPADM

## 2020-06-04 RX ORDER — POTASSIUM CHLORIDE 1.5 G/1.58G
20 POWDER, FOR SOLUTION ORAL EVERY 4 HOURS PRN
Status: DISCONTINUED | OUTPATIENT
Start: 2020-06-04 | End: 2020-06-06 | Stop reason: HOSPADM

## 2020-06-04 RX ORDER — CALCIUM CARBONATE 500 MG/1
500 TABLET, CHEWABLE ORAL 3 TIMES DAILY
Status: DISCONTINUED | OUTPATIENT
Start: 2020-06-04 | End: 2020-06-06 | Stop reason: HOSPADM

## 2020-06-04 RX ORDER — POTASSIUM CITRATE 10 MEQ/1
1080 TABLET, EXTENDED RELEASE ORAL
Status: DISCONTINUED | OUTPATIENT
Start: 2020-06-05 | End: 2020-06-06 | Stop reason: HOSPADM

## 2020-06-04 RX ORDER — VALSARTAN 160 MG/1
160 TABLET ORAL DAILY
Status: DISCONTINUED | OUTPATIENT
Start: 2020-06-05 | End: 2020-06-06 | Stop reason: HOSPADM

## 2020-06-04 RX ORDER — PRAZOSIN HYDROCHLORIDE 1 MG/1
2 CAPSULE ORAL NIGHTLY
Status: DISCONTINUED | OUTPATIENT
Start: 2020-06-04 | End: 2020-06-06 | Stop reason: HOSPADM

## 2020-06-04 RX ORDER — ACETAMINOPHEN 325 MG/1
650 TABLET ORAL ONCE
Status: CANCELLED | OUTPATIENT
Start: 2020-07-01

## 2020-06-04 RX ORDER — CHOLECALCIFEROL (VITAMIN D3) 125 MCG
1000 CAPSULE ORAL DAILY
Status: DISCONTINUED | OUTPATIENT
Start: 2020-06-05 | End: 2020-06-06 | Stop reason: HOSPADM

## 2020-06-04 RX ORDER — POTASSIUM CHLORIDE 14.9 G/1000ML
40 INJECTION, SOLUTION INTRAVENOUS EVERY 4 HOURS PRN
Status: DISCONTINUED | OUTPATIENT
Start: 2020-06-04 | End: 2020-06-06 | Stop reason: HOSPADM

## 2020-06-04 RX ORDER — MAGNESIUM SULFATE 1 G/100ML
1 INJECTION INTRAVENOUS DAILY PRN
Status: DISCONTINUED | OUTPATIENT
Start: 2020-06-04 | End: 2020-06-06 | Stop reason: HOSPADM

## 2020-06-04 RX ORDER — CLONAZEPAM 0.5 MG/1
0.5 TABLET ORAL ONCE
Status: COMPLETED | OUTPATIENT
Start: 2020-06-04 | End: 2020-06-04

## 2020-06-04 RX ORDER — POLYETHYLENE GLYCOL 3350 17 G/17G
17 POWDER, FOR SOLUTION ORAL DAILY
Status: DISCONTINUED | OUTPATIENT
Start: 2020-06-05 | End: 2020-06-05

## 2020-06-04 RX ORDER — GABAPENTIN 300 MG/1
300 CAPSULE ORAL NIGHTLY
Status: DISCONTINUED | OUTPATIENT
Start: 2020-06-04 | End: 2020-06-06 | Stop reason: HOSPADM

## 2020-06-04 RX ORDER — NICOTINE POLACRILEX 4 MG
30 LOZENGE BUCCAL PRN
Status: DISCONTINUED | OUTPATIENT
Start: 2020-06-04 | End: 2020-06-06 | Stop reason: HOSPADM

## 2020-06-04 RX ORDER — HEPARIN SODIUM 5000 [USP'U]/ML
5000 INJECTION, SOLUTION INTRAVENOUS; SUBCUTANEOUS EVERY 8 HOURS SCHEDULED
Status: DISCONTINUED | OUTPATIENT
Start: 2020-06-04 | End: 2020-06-06 | Stop reason: HOSPADM

## 2020-06-04 RX ORDER — POTASSIUM CHLORIDE 14.9 MG/ML
20 INJECTION INTRAVENOUS EVERY 4 HOURS PRN
Status: DISCONTINUED | OUTPATIENT
Start: 2020-06-04 | End: 2020-06-06 | Stop reason: HOSPADM

## 2020-06-04 RX ORDER — POTASSIUM CHLORIDE 20 MEQ/1
40 TABLET, EXTENDED RELEASE ORAL EVERY 4 HOURS PRN
Status: DISCONTINUED | OUTPATIENT
Start: 2020-06-04 | End: 2020-06-06 | Stop reason: HOSPADM

## 2020-06-04 RX ORDER — HEPARIN 100 UNIT/ML
5 SYRINGE INTRAVENOUS PRN
Status: CANCELLED | OUTPATIENT
Start: 2020-06-04

## 2020-06-04 RX ORDER — DIPHENHYDRAMINE HCL 25 MG
25 CAPSULE ORAL ONCE
Status: CANCELLED | OUTPATIENT
Start: 2020-07-01

## 2020-06-04 RX ORDER — 0.9 % SODIUM CHLORIDE 0.9 %
2 VIAL (ML) INJECTION PRN
Status: CANCELLED | OUTPATIENT
Start: 2020-07-01

## 2020-06-04 RX ORDER — BUTALBITAL, ACETAMINOPHEN AND CAFFEINE 50; 325; 40 MG/1; MG/1; MG/1
1 TABLET ORAL EVERY 4 HOURS PRN
Status: DISCONTINUED | OUTPATIENT
Start: 2020-06-04 | End: 2020-06-06 | Stop reason: HOSPADM

## 2020-06-04 RX ORDER — ACETAMINOPHEN 325 MG/1
650 TABLET ORAL EVERY 4 HOURS PRN
Status: DISCONTINUED | OUTPATIENT
Start: 2020-06-04 | End: 2020-06-06 | Stop reason: HOSPADM

## 2020-06-04 RX ORDER — AMMONIUM LACTATE 12 G/100G
LOTION TOPICAL 2 TIMES DAILY PRN
Status: DISCONTINUED | OUTPATIENT
Start: 2020-06-04 | End: 2020-06-06 | Stop reason: HOSPADM

## 2020-06-04 RX ORDER — POTASSIUM CHLORIDE 20 MEQ/1
20 TABLET, EXTENDED RELEASE ORAL EVERY 4 HOURS PRN
Status: DISCONTINUED | OUTPATIENT
Start: 2020-06-04 | End: 2020-06-06 | Stop reason: HOSPADM

## 2020-06-04 RX ORDER — MONTELUKAST SODIUM 10 MG/1
10 TABLET ORAL EVERY EVENING
Status: DISCONTINUED | OUTPATIENT
Start: 2020-06-04 | End: 2020-06-06 | Stop reason: HOSPADM

## 2020-06-04 RX ORDER — GABAPENTIN 100 MG/1
100 CAPSULE ORAL 3 TIMES DAILY
Status: DISCONTINUED | OUTPATIENT
Start: 2020-06-04 | End: 2020-06-06 | Stop reason: HOSPADM

## 2020-06-04 RX ORDER — IPRATROPIUM BROMIDE AND ALBUTEROL SULFATE 2.5; .5 MG/3ML; MG/3ML
3 SOLUTION RESPIRATORY (INHALATION)
Status: DISCONTINUED | OUTPATIENT
Start: 2020-06-04 | End: 2020-06-06 | Stop reason: HOSPADM

## 2020-06-04 RX ORDER — POTASSIUM CHLORIDE 1.5 G/1.58G
40 POWDER, FOR SOLUTION ORAL EVERY 4 HOURS PRN
Status: DISCONTINUED | OUTPATIENT
Start: 2020-06-04 | End: 2020-06-06 | Stop reason: HOSPADM

## 2020-06-04 RX ORDER — IMMUNE GLOBULIN INFUSION (HUMAN) 100 MG/ML
25 INJECTION, SOLUTION INTRAVENOUS; SUBCUTANEOUS ONCE
Status: CANCELLED | OUTPATIENT
Start: 2020-07-01

## 2020-06-04 RX ORDER — FAMOTIDINE 20 MG/1
20 TABLET, FILM COATED ORAL 2 TIMES DAILY
COMMUNITY

## 2020-06-04 RX ORDER — FUROSEMIDE 10 MG/ML
40 INJECTION INTRAMUSCULAR; INTRAVENOUS DAILY
Status: DISCONTINUED | OUTPATIENT
Start: 2020-06-05 | End: 2020-06-06 | Stop reason: HOSPADM

## 2020-06-04 RX ORDER — NICOTINE POLACRILEX 4 MG
15 LOZENGE BUCCAL PRN
Status: DISCONTINUED | OUTPATIENT
Start: 2020-06-04 | End: 2020-06-06 | Stop reason: HOSPADM

## 2020-06-04 RX ORDER — IMMUNE GLOBULIN INFUSION (HUMAN) 100 MG/ML
25 INJECTION, SOLUTION INTRAVENOUS; SUBCUTANEOUS ONCE
Status: COMPLETED | OUTPATIENT
Start: 2020-06-04 | End: 2020-06-04

## 2020-06-04 RX ORDER — FLUTICASONE PROPIONATE 50 MCG
1 SPRAY, SUSPENSION (ML) NASAL DAILY
Status: DISCONTINUED | OUTPATIENT
Start: 2020-06-05 | End: 2020-06-06 | Stop reason: HOSPADM

## 2020-06-04 RX ORDER — DEXTROSE MONOHYDRATE 25 G/50ML
25 INJECTION, SOLUTION INTRAVENOUS PRN
Status: DISCONTINUED | OUTPATIENT
Start: 2020-06-04 | End: 2020-06-06 | Stop reason: HOSPADM

## 2020-06-04 RX ORDER — DEXTROSE MONOHYDRATE 50 MG/ML
INJECTION, SOLUTION INTRAVENOUS CONTINUOUS PRN
Status: DISCONTINUED | OUTPATIENT
Start: 2020-06-04 | End: 2020-06-06 | Stop reason: HOSPADM

## 2020-06-04 RX ORDER — LEVOTHYROXINE SODIUM 0.05 MG/1
50 TABLET ORAL
Status: DISCONTINUED | OUTPATIENT
Start: 2020-06-05 | End: 2020-06-06 | Stop reason: HOSPADM

## 2020-06-04 RX ORDER — HEPARIN 100 UNIT/ML
5 SYRINGE INTRAVENOUS PRN
Status: DISCONTINUED | OUTPATIENT
Start: 2020-06-04 | End: 2020-06-04 | Stop reason: HOSPADM

## 2020-06-04 RX ORDER — ISOSORBIDE DINITRATE 10 MG/1
10 TABLET ORAL 3 TIMES DAILY
Status: DISCONTINUED | OUTPATIENT
Start: 2020-06-04 | End: 2020-06-06 | Stop reason: HOSPADM

## 2020-06-04 RX ORDER — PANTOPRAZOLE SODIUM 40 MG/1
40 TABLET, DELAYED RELEASE ORAL
Status: DISCONTINUED | OUTPATIENT
Start: 2020-06-05 | End: 2020-06-06 | Stop reason: HOSPADM

## 2020-06-04 RX ORDER — ARIPIPRAZOLE 2 MG/1
2 TABLET ORAL DAILY
Status: DISCONTINUED | OUTPATIENT
Start: 2020-06-05 | End: 2020-06-05

## 2020-06-04 RX ORDER — DICYCLOMINE HYDROCHLORIDE 10 MG/1
10 CAPSULE ORAL
Status: DISCONTINUED | OUTPATIENT
Start: 2020-06-05 | End: 2020-06-06 | Stop reason: HOSPADM

## 2020-06-04 RX ORDER — POTASSIUM CHLORIDE 20 MEQ/1
20 TABLET, EXTENDED RELEASE ORAL 2 TIMES DAILY
Status: DISCONTINUED | OUTPATIENT
Start: 2020-06-04 | End: 2020-06-06 | Stop reason: HOSPADM

## 2020-06-04 RX ADMIN — MONTELUKAST SODIUM 10 MG: 10 TABLET, FILM COATED ORAL at 20:46

## 2020-06-04 RX ADMIN — SODIUM CHLORIDE 250 ML: 9 INJECTION, SOLUTION INTRAVENOUS at 09:09

## 2020-06-04 RX ADMIN — IMMUNE GLOBULIN INFUSION (HUMAN) 25 G: 100 INJECTION, SOLUTION INTRAVENOUS; SUBCUTANEOUS at 09:09

## 2020-06-04 RX ADMIN — FUROSEMIDE 40 MG: 10 INJECTION, SOLUTION INTRAMUSCULAR; INTRAVENOUS at 15:49

## 2020-06-04 RX ADMIN — ISOSORBIDE DINITRATE 10 MG: 10 TABLET ORAL at 20:45

## 2020-06-04 RX ADMIN — PHENYTOIN 2 MG: 125 SUSPENSION ORAL at 20:45

## 2020-06-04 RX ADMIN — GABAPENTIN 300 MG: 300 CAPSULE ORAL at 20:46

## 2020-06-04 RX ADMIN — SODIUM CHLORIDE, PRESERVATIVE FREE 2 ML: 5 INJECTION INTRAVENOUS at 20:45

## 2020-06-04 RX ADMIN — UMECLIDINIUM 1 PUFF: 62.5 AEROSOL, POWDER ORAL at 22:55

## 2020-06-04 RX ADMIN — POTASSIUM CHLORIDE 20 MEQ: 1500 TABLET, EXTENDED RELEASE ORAL at 20:46

## 2020-06-04 RX ADMIN — ACETAMINOPHEN AND CODEINE PHOSPHATE 1 TABLET: 300; 30 TABLET ORAL at 18:28

## 2020-06-04 RX ADMIN — CLONAZEPAM 0.5 MG: 0.5 TABLET ORAL at 22:41

## 2020-06-04 RX ADMIN — HEPARIN SODIUM 5000 UNITS: 5000 INJECTION INTRAVENOUS; SUBCUTANEOUS at 22:41

## 2020-06-04 RX ADMIN — MELATONIN 2000 UNITS: at 20:46

## 2020-06-04 ASSESSMENT — ENCOUNTER SYMPTOMS
PHOTOPHOBIA: 0
DIZZINESS: 0
ABDOMINAL PAIN: 0
VOMITING: 0
HEADACHES: 0
SINUS PRESSURE: 0
WOUND: 0
CHILLS: 0
LIGHT-HEADEDNESS: 0
DIARRHEA: 0
NUMBNESS: 0
NAUSEA: 0
WEAKNESS: 1
BACK PAIN: 0
ADENOPATHY: 0
FATIGUE: 1
CONFUSION: 0
APPETITE CHANGE: 0
NERVOUS/ANXIOUS: 0
COLOR CHANGE: 0
SORE THROAT: 0
FEVER: 0
SHORTNESS OF BREATH: 1
CHEST TIGHTNESS: 0
BRUISES/BLEEDS EASILY: 0
RHINORRHEA: 0
ACTIVITY CHANGE: 0

## 2020-06-04 ASSESSMENT — COGNITIVE AND FUNCTIONAL STATUS - GENERAL
BECAUSE OF A PHYSICAL, MENTAL, OR EMOTIONAL CONDITION, DO YOU HAVE SERIOUS DIFFICULTY CONCENTRATING, REMEMBERING OR MAKING DECISIONS: NO
BECAUSE OF A PHYSICAL, MENTAL, OR EMOTIONAL CONDITION, DO YOU HAVE DIFFICULTY DOING ERRANDS ALONE: NO
ARE YOU DEAF OR DO YOU HAVE SERIOUS DIFFICULTY  HEARING: NO
DO YOU HAVE SERIOUS DIFFICULTY WALKING OR CLIMBING STAIRS: NO
DO YOU HAVE DIFFICULTY DRESSING OR BATHING: NO
ARE YOU BLIND OR DO YOU HAVE SERIOUS DIFFICULTY SEEING, EVEN WHEN WEARING GLASSES: NO

## 2020-06-04 ASSESSMENT — LIFESTYLE VARIABLES
HOW OFTEN DO YOU HAVE 6 OR MORE DRINKS ON ONE OCCASION: NEVER
HOW OFTEN DO YOU HAVE A DRINK CONTAINING ALCOHOL: MONTHLY OR LESS
HOW MANY STANDARD DRINKS CONTAINING ALCOHOL DO YOU HAVE ON A TYPICAL DAY: 0,1 OR 2
AUDIT-C TOTAL SCORE: 1
ALCOHOL_USE_STATUS: NO OR LOW RISK WITH VALIDATED TOOL

## 2020-06-04 ASSESSMENT — COLUMBIA-SUICIDE SEVERITY RATING SCALE - C-SSRS
2. HAVE YOU ACTUALLY HAD ANY THOUGHTS OF KILLING YOURSELF?: NO
IS THE PATIENT ABLE TO COMPLETE C-SSRS: YES
6. HAVE YOU EVER DONE ANYTHING, STARTED TO DO ANYTHING, OR PREPARED TO DO ANYTHING TO END YOUR LIFE?: NO
1. WITHIN THE PAST MONTH, HAVE YOU WISHED YOU WERE DEAD OR WISHED YOU COULD GO TO SLEEP AND NOT WAKE UP?: NO

## 2020-06-04 ASSESSMENT — PAIN SCALES - GENERAL
PAINLEVEL_OUTOF10: 0
PAINLEVEL_OUTOF10: 2
PAINLEVEL_OUTOF10: 0
PAINLEVEL_OUTOF10: 7
PAINLEVEL: 0

## 2020-06-04 ASSESSMENT — PATIENT HEALTH QUESTIONNAIRE - PHQ9
IS PATIENT ABLE TO COMPLETE PHQ2 OR PHQ9: YES
SUM OF ALL RESPONSES TO PHQ9 QUESTIONS 1 AND 2: 2
CLINICAL INTERPRETATION OF PHQ9 SCORE: NO FURTHER SCREENING NEEDED
CLINICAL INTERPRETATION OF PHQ2 SCORE: NO FURTHER SCREENING NEEDED
2. FEELING DOWN, DEPRESSED OR HOPELESS: SEVERAL DAYS
SUM OF ALL RESPONSES TO PHQ9 QUESTIONS 1 AND 2: 2
1. LITTLE INTEREST OR PLEASURE IN DOING THINGS: SEVERAL DAYS

## 2020-06-04 ASSESSMENT — ACTIVITIES OF DAILY LIVING (ADL)
RECENT_DECLINE_ADL: NO
CHRONIC_PAIN_PRESENT: YES, CHRONIC
ADL_BEFORE_ADMISSION: INDEPENDENT
ADL_SCORE: 12
DESCRIBE HOW PAIN IMPACTS YOUR LIFE: NONE/CAN MANAGE
ADL_SHORT_OF_BREATH: NO

## 2020-06-05 ENCOUNTER — TELEPHONE (OUTPATIENT)
Dept: ALLERGY | Age: 65
End: 2020-06-05

## 2020-06-05 DIAGNOSIS — D83.9 CVID (COMMON VARIABLE IMMUNODEFICIENCY) (CMD): Primary | ICD-10-CM

## 2020-06-05 LAB
ANION GAP SERPL CALC-SCNC: 11 MMOL/L (ref 10–20)
BASOPHILS # BLD: 0 K/MCL (ref 0–0.3)
BASOPHILS NFR BLD: 1 %
BUN SERPL-MCNC: 19 MG/DL (ref 6–20)
BUN/CREAT SERPL: 14 (ref 7–25)
CALCIUM SERPL-MCNC: 8.5 MG/DL (ref 8.4–10.2)
CHLORIDE SERPL-SCNC: 94 MMOL/L (ref 98–107)
CO2 SERPL-SCNC: 30 MMOL/L (ref 21–32)
CREAT SERPL-MCNC: 1.37 MG/DL (ref 0.51–0.95)
DEPRECATED RDW RBC: 38.2 FL (ref 39–50)
EOSINOPHIL # BLD: 0 K/MCL (ref 0.1–0.5)
EOSINOPHIL NFR BLD: 1 %
ERYTHROCYTE [DISTWIDTH] IN BLOOD: 12 % (ref 11–15)
FASTING DURATION TIME PATIENT: ABNORMAL H
GFR SERPLBLD BASED ON 1.73 SQ M-ARVRAT: 41 ML/MIN
GLUCOSE BLDC GLUCOMTR-MCNC: 110 MG/DL (ref 70–99)
GLUCOSE BLDC GLUCOMTR-MCNC: 111 MG/DL (ref 70–99)
GLUCOSE BLDC GLUCOMTR-MCNC: 116 MG/DL (ref 70–99)
GLUCOSE BLDC GLUCOMTR-MCNC: 120 MG/DL (ref 70–99)
GLUCOSE SERPL-MCNC: 112 MG/DL (ref 65–99)
HCT VFR BLD CALC: 30.5 % (ref 36–46.5)
HGB BLD-MCNC: 10.7 G/DL (ref 12–15.5)
IMM GRANULOCYTES # BLD AUTO: 0 K/MCL (ref 0–0.2)
IMM GRANULOCYTES # BLD: 1 %
LYMPHOCYTES # BLD: 1.5 K/MCL (ref 1–4)
LYMPHOCYTES NFR BLD: 36 %
MCH RBC QN AUTO: 30.9 PG (ref 26–34)
MCHC RBC AUTO-ENTMCNC: 35.1 G/DL (ref 32–36.5)
MCV RBC AUTO: 88.2 FL (ref 78–100)
MONOCYTES # BLD: 0.5 K/MCL (ref 0.3–0.9)
MONOCYTES NFR BLD: 12 %
NEUTROPHILS # BLD: 2.2 K/MCL (ref 1.8–7.7)
NEUTROPHILS NFR BLD: 49 %
NRBC BLD MANUAL-RTO: 0 /100 WBC
NT-PROBNP SERPL-MCNC: 1628 PG/ML
PLATELET # BLD AUTO: 200 K/MCL (ref 140–450)
POTASSIUM SERPL-SCNC: 4 MMOL/L (ref 3.4–5.1)
PROCALCITONIN SERPL IA-MCNC: <0.05 NG/ML
RBC # BLD: 3.46 MIL/MCL (ref 4–5.2)
SODIUM SERPL-SCNC: 131 MMOL/L (ref 135–145)
WBC # BLD: 4.3 K/MCL (ref 4.2–11)

## 2020-06-05 PROCEDURE — 10004651 HB RX, NO CHARGE ITEM: Performed by: HOSPITALIST

## 2020-06-05 PROCEDURE — 85025 COMPLETE CBC W/AUTO DIFF WBC: CPT | Performed by: HOSPITALIST

## 2020-06-05 PROCEDURE — 99225 SUBSEQUENT OBSERVATION CARE LEVEL II: CPT | Performed by: HOSPITALIST

## 2020-06-05 PROCEDURE — 94640 AIRWAY INHALATION TREATMENT: CPT

## 2020-06-05 PROCEDURE — 10002801 HB RX 250 W/O HCPCS: Performed by: HOSPITALIST

## 2020-06-05 PROCEDURE — 83880 ASSAY OF NATRIURETIC PEPTIDE: CPT | Performed by: HOSPITALIST

## 2020-06-05 PROCEDURE — G0378 HOSPITAL OBSERVATION PER HR: HCPCS

## 2020-06-05 PROCEDURE — 10002800 HB RX 250 W HCPCS: Performed by: HOSPITALIST

## 2020-06-05 PROCEDURE — 10002803 HB RX 637: Performed by: HOSPITALIST

## 2020-06-05 PROCEDURE — 82962 GLUCOSE BLOOD TEST: CPT

## 2020-06-05 PROCEDURE — 80048 BASIC METABOLIC PNL TOTAL CA: CPT | Performed by: HOSPITALIST

## 2020-06-05 PROCEDURE — 96372 THER/PROPH/DIAG INJ SC/IM: CPT | Performed by: HOSPITALIST

## 2020-06-05 PROCEDURE — 96375 TX/PRO/DX INJ NEW DRUG ADDON: CPT

## 2020-06-05 PROCEDURE — 10004325 HB COUNTER ASSESSMENT EA 15 MIN

## 2020-06-05 PROCEDURE — 10004323 HB COUNTER MNT INITAL VISIT EA 15 MIN

## 2020-06-05 RX ORDER — FAMOTIDINE 20 MG/1
20 TABLET, FILM COATED ORAL 2 TIMES DAILY
Status: DISCONTINUED | OUTPATIENT
Start: 2020-06-05 | End: 2020-06-06 | Stop reason: HOSPADM

## 2020-06-05 RX ORDER — POLYETHYLENE GLYCOL 3350 17 G/17G
34 POWDER, FOR SOLUTION ORAL DAILY
Status: DISCONTINUED | OUTPATIENT
Start: 2020-06-05 | End: 2020-06-06 | Stop reason: HOSPADM

## 2020-06-05 RX ORDER — DIPHENHYDRAMINE HCL 25 MG
50 CAPSULE ORAL NIGHTLY PRN
Status: DISCONTINUED | OUTPATIENT
Start: 2020-06-05 | End: 2020-06-06 | Stop reason: HOSPADM

## 2020-06-05 RX ORDER — CLONAZEPAM 0.5 MG/1
0.5 TABLET ORAL NIGHTLY
Status: DISCONTINUED | OUTPATIENT
Start: 2020-06-05 | End: 2020-06-06 | Stop reason: HOSPADM

## 2020-06-05 RX ORDER — FERROUS SULFATE 325(65) MG
325 TABLET ORAL 2 TIMES DAILY
Status: DISCONTINUED | OUTPATIENT
Start: 2020-06-05 | End: 2020-06-06 | Stop reason: HOSPADM

## 2020-06-05 RX ORDER — HYDROXYZINE HYDROCHLORIDE 25 MG/1
50 TABLET, FILM COATED ORAL NIGHTLY
Status: DISCONTINUED | OUTPATIENT
Start: 2020-06-05 | End: 2020-06-06 | Stop reason: HOSPADM

## 2020-06-05 RX ORDER — TIZANIDINE 4 MG/1
4 TABLET ORAL NIGHTLY
Status: DISCONTINUED | OUTPATIENT
Start: 2020-06-05 | End: 2020-06-06 | Stop reason: HOSPADM

## 2020-06-05 RX ORDER — LANOLIN ALCOHOL/MO/W.PET/CERES
400 CREAM (GRAM) TOPICAL 2 TIMES DAILY
Status: DISCONTINUED | OUTPATIENT
Start: 2020-06-05 | End: 2020-06-06 | Stop reason: HOSPADM

## 2020-06-05 RX ADMIN — POTASSIUM CITRATE 1080 MG: 10 TABLET, EXTENDED RELEASE ORAL at 13:59

## 2020-06-05 RX ADMIN — GUAIFENESIN AND DEXTROMETHORPHAN HYDROBROMIDE 2 TABLET: 600; 30 TABLET, EXTENDED RELEASE ORAL at 11:50

## 2020-06-05 RX ADMIN — FUROSEMIDE 20 MG: 10 INJECTION, SOLUTION INTRAMUSCULAR; INTRAVENOUS at 16:07

## 2020-06-05 RX ADMIN — DICYCLOMINE HYDROCHLORIDE 10 MG: 10 CAPSULE ORAL at 06:13

## 2020-06-05 RX ADMIN — ACETAMINOPHEN AND CODEINE PHOSPHATE 1 TABLET: 300; 30 TABLET ORAL at 13:59

## 2020-06-05 RX ADMIN — DICYCLOMINE HYDROCHLORIDE 10 MG: 10 CAPSULE ORAL at 16:07

## 2020-06-05 RX ADMIN — ISOSORBIDE DINITRATE 10 MG: 10 TABLET ORAL at 13:59

## 2020-06-05 RX ADMIN — IPRATROPIUM BROMIDE AND ALBUTEROL SULFATE 3 ML: 2.5; .5 SOLUTION RESPIRATORY (INHALATION) at 16:40

## 2020-06-05 RX ADMIN — CLONAZEPAM 0.5 MG: 0.5 TABLET ORAL at 21:06

## 2020-06-05 RX ADMIN — GABAPENTIN 100 MG: 100 CAPSULE ORAL at 11:49

## 2020-06-05 RX ADMIN — POTASSIUM CHLORIDE 20 MEQ: 1500 TABLET, EXTENDED RELEASE ORAL at 09:05

## 2020-06-05 RX ADMIN — HEPARIN SODIUM 5000 UNITS: 5000 INJECTION INTRAVENOUS; SUBCUTANEOUS at 21:08

## 2020-06-05 RX ADMIN — FERROUS SULFATE TAB 325 MG (65 MG ELEMENTAL FE) 325 MG: 325 (65 FE) TAB at 13:59

## 2020-06-05 RX ADMIN — DICYCLOMINE HYDROCHLORIDE 10 MG: 10 CAPSULE ORAL at 11:49

## 2020-06-05 RX ADMIN — CALCIUM CARBONATE (ANTACID) CHEW TAB 500 MG 500 MG: 500 CHEW TAB at 09:05

## 2020-06-05 RX ADMIN — TIZANIDINE 4 MG: 4 TABLET ORAL at 21:06

## 2020-06-05 RX ADMIN — VALSARTAN 160 MG: 160 TABLET, FILM COATED ORAL at 09:05

## 2020-06-05 RX ADMIN — Medication 1000 MCG: at 09:05

## 2020-06-05 RX ADMIN — SODIUM CHLORIDE, PRESERVATIVE FREE 2 ML: 5 INJECTION INTRAVENOUS at 21:10

## 2020-06-05 RX ADMIN — CITALOPRAM HYDROBROMIDE 30 MG: 20 TABLET ORAL at 11:49

## 2020-06-05 RX ADMIN — GABAPENTIN 300 MG: 300 CAPSULE ORAL at 21:06

## 2020-06-05 RX ADMIN — CALCIUM CARBONATE (ANTACID) CHEW TAB 500 MG 500 MG: 500 CHEW TAB at 21:05

## 2020-06-05 RX ADMIN — PANTOPRAZOLE SODIUM 40 MG: 40 TABLET, DELAYED RELEASE ORAL at 06:13

## 2020-06-05 RX ADMIN — MONTELUKAST SODIUM 10 MG: 10 TABLET, FILM COATED ORAL at 18:08

## 2020-06-05 RX ADMIN — POTASSIUM CHLORIDE 20 MEQ: 1500 TABLET, EXTENDED RELEASE ORAL at 21:06

## 2020-06-05 RX ADMIN — SODIUM CHLORIDE, PRESERVATIVE FREE 2 ML: 5 INJECTION INTRAVENOUS at 14:04

## 2020-06-05 RX ADMIN — GABAPENTIN 100 MG: 100 CAPSULE ORAL at 16:07

## 2020-06-05 RX ADMIN — CALCIUM CARBONATE (ANTACID) CHEW TAB 500 MG 500 MG: 500 CHEW TAB at 13:59

## 2020-06-05 RX ADMIN — HYDROXYZINE HYDROCHLORIDE 50 MG: 25 TABLET ORAL at 21:05

## 2020-06-05 RX ADMIN — GABAPENTIN 100 MG: 100 CAPSULE ORAL at 09:05

## 2020-06-05 RX ADMIN — GUAIFENESIN AND DEXTROMETHORPHAN HYDROBROMIDE 2 TABLET: 600; 30 TABLET, EXTENDED RELEASE ORAL at 21:05

## 2020-06-05 RX ADMIN — POLYETHYLENE GLYCOL 3350 34 G: 17 POWDER, FOR SOLUTION ORAL at 13:58

## 2020-06-05 RX ADMIN — ACETAMINOPHEN AND CODEINE PHOSPHATE 1 TABLET: 300; 30 TABLET ORAL at 03:28

## 2020-06-05 RX ADMIN — HEPARIN SODIUM 5000 UNITS: 5000 INJECTION INTRAVENOUS; SUBCUTANEOUS at 06:12

## 2020-06-05 RX ADMIN — LEVOTHYROXINE SODIUM 50 MCG: 50 TABLET ORAL at 06:13

## 2020-06-05 RX ADMIN — FLUTICASONE FUROATE AND VILANTEROL TRIFENATATE 1 PUFF: 200; 25 POWDER RESPIRATORY (INHALATION) at 07:00

## 2020-06-05 RX ADMIN — Medication 400 MG: at 13:59

## 2020-06-05 RX ADMIN — HEPARIN SODIUM 5000 UNITS: 5000 INJECTION INTRAVENOUS; SUBCUTANEOUS at 13:59

## 2020-06-05 RX ADMIN — ISOSORBIDE DINITRATE 10 MG: 10 TABLET ORAL at 09:05

## 2020-06-05 RX ADMIN — Medication 400 MG: at 21:06

## 2020-06-05 RX ADMIN — PHENYTOIN 2 MG: 125 SUSPENSION ORAL at 21:06

## 2020-06-05 RX ADMIN — ACETAMINOPHEN AND CODEINE PHOSPHATE 1 TABLET: 300; 30 TABLET ORAL at 18:12

## 2020-06-05 RX ADMIN — UMECLIDINIUM 1 PUFF: 62.5 AEROSOL, POWDER ORAL at 06:58

## 2020-06-05 RX ADMIN — ISOSORBIDE DINITRATE 10 MG: 10 TABLET ORAL at 21:05

## 2020-06-05 RX ADMIN — MELATONIN 2000 UNITS: at 18:08

## 2020-06-05 RX ADMIN — FLUTICASONE PROPIONATE 1 SPRAY: 50 SPRAY, METERED NASAL at 09:05

## 2020-06-05 RX ADMIN — POLYETHYLENE GLYCOL 3350 17 G: 17 POWDER, FOR SOLUTION ORAL at 09:05

## 2020-06-05 RX ADMIN — FUROSEMIDE 40 MG: 10 INJECTION, SOLUTION INTRAMUSCULAR; INTRAVENOUS at 09:05

## 2020-06-05 RX ADMIN — FERROUS SULFATE TAB 325 MG (65 MG ELEMENTAL FE) 325 MG: 325 (65 FE) TAB at 21:06

## 2020-06-05 ASSESSMENT — PAIN SCALES - GENERAL
PAINLEVEL_OUTOF10: 4
PAINLEVEL_OUTOF10: 2
PAINLEVEL_OUTOF10: 3
PAINLEVEL_OUTOF10: 2
PAINLEVEL_OUTOF10: 5
PAINLEVEL_OUTOF10: 3
PAINLEVEL_OUTOF10: 5
PAINLEVEL_OUTOF10: 4
PAINLEVEL_OUTOF10: 2

## 2020-06-06 VITALS
DIASTOLIC BLOOD PRESSURE: 66 MMHG | HEART RATE: 70 BPM | RESPIRATION RATE: 18 BRPM | TEMPERATURE: 98.9 F | SYSTOLIC BLOOD PRESSURE: 140 MMHG | OXYGEN SATURATION: 91 % | WEIGHT: 271.4 LBS | BODY MASS INDEX: 45.22 KG/M2 | HEIGHT: 65 IN

## 2020-06-06 LAB
ANION GAP SERPL CALC-SCNC: 9 MMOL/L (ref 10–20)
BUN SERPL-MCNC: 23 MG/DL (ref 6–20)
BUN/CREAT SERPL: 13 (ref 7–25)
CALCIUM SERPL-MCNC: 8.7 MG/DL (ref 8.4–10.2)
CHLORIDE SERPL-SCNC: 94 MMOL/L (ref 98–107)
CO2 SERPL-SCNC: 32 MMOL/L (ref 21–32)
CREAT SERPL-MCNC: 1.72 MG/DL (ref 0.51–0.95)
FASTING DURATION TIME PATIENT: ABNORMAL H
GFR SERPLBLD BASED ON 1.73 SQ M-ARVRAT: 31 ML/MIN
GLUCOSE BLDC GLUCOMTR-MCNC: 102 MG/DL (ref 70–99)
GLUCOSE BLDC GLUCOMTR-MCNC: 103 MG/DL (ref 70–99)
GLUCOSE SERPL-MCNC: 99 MG/DL (ref 65–99)
POTASSIUM SERPL-SCNC: 4.3 MMOL/L (ref 3.4–5.1)
SODIUM SERPL-SCNC: 131 MMOL/L (ref 135–145)

## 2020-06-06 PROCEDURE — 99217 OBSERVATION CARE DISCHARGE: CPT | Performed by: HOSPITALIST

## 2020-06-06 PROCEDURE — 10002803 HB RX 637: Performed by: HOSPITALIST

## 2020-06-06 PROCEDURE — 96376 TX/PRO/DX INJ SAME DRUG ADON: CPT

## 2020-06-06 PROCEDURE — G0378 HOSPITAL OBSERVATION PER HR: HCPCS

## 2020-06-06 PROCEDURE — 10004651 HB RX, NO CHARGE ITEM: Performed by: HOSPITALIST

## 2020-06-06 PROCEDURE — 82962 GLUCOSE BLOOD TEST: CPT

## 2020-06-06 PROCEDURE — 96372 THER/PROPH/DIAG INJ SC/IM: CPT | Performed by: HOSPITALIST

## 2020-06-06 PROCEDURE — 80048 BASIC METABOLIC PNL TOTAL CA: CPT | Performed by: HOSPITALIST

## 2020-06-06 PROCEDURE — 94640 AIRWAY INHALATION TREATMENT: CPT

## 2020-06-06 PROCEDURE — 10002800 HB RX 250 W HCPCS: Performed by: HOSPITALIST

## 2020-06-06 RX ORDER — HEPARIN 100 UNIT/ML
500 SYRINGE INTRAVENOUS PRN
Status: DISCONTINUED | OUTPATIENT
Start: 2020-06-06 | End: 2020-06-06 | Stop reason: HOSPADM

## 2020-06-06 RX ADMIN — FUROSEMIDE 40 MG: 10 INJECTION, SOLUTION INTRAMUSCULAR; INTRAVENOUS at 09:11

## 2020-06-06 RX ADMIN — FERROUS SULFATE TAB 325 MG (65 MG ELEMENTAL FE) 325 MG: 325 (65 FE) TAB at 09:08

## 2020-06-06 RX ADMIN — ISOSORBIDE DINITRATE 10 MG: 10 TABLET ORAL at 14:17

## 2020-06-06 RX ADMIN — DICYCLOMINE HYDROCHLORIDE 10 MG: 10 CAPSULE ORAL at 06:30

## 2020-06-06 RX ADMIN — POTASSIUM CHLORIDE 20 MEQ: 1500 TABLET, EXTENDED RELEASE ORAL at 09:28

## 2020-06-06 RX ADMIN — UMECLIDINIUM 1 PUFF: 62.5 AEROSOL, POWDER ORAL at 09:10

## 2020-06-06 RX ADMIN — FLUTICASONE PROPIONATE 1 SPRAY: 50 SPRAY, METERED NASAL at 09:10

## 2020-06-06 RX ADMIN — FLUTICASONE FUROATE AND VILANTEROL TRIFENATATE 1 PUFF: 200; 25 POWDER RESPIRATORY (INHALATION) at 09:14

## 2020-06-06 RX ADMIN — Medication 1000 MCG: at 09:07

## 2020-06-06 RX ADMIN — VALSARTAN 160 MG: 160 TABLET, FILM COATED ORAL at 09:09

## 2020-06-06 RX ADMIN — GABAPENTIN 100 MG: 100 CAPSULE ORAL at 12:19

## 2020-06-06 RX ADMIN — CALCIUM CARBONATE (ANTACID) CHEW TAB 500 MG 500 MG: 500 CHEW TAB at 14:18

## 2020-06-06 RX ADMIN — FAMOTIDINE 20 MG: 20 TABLET, FILM COATED ORAL at 12:19

## 2020-06-06 RX ADMIN — POTASSIUM CITRATE 1080 MG: 10 TABLET, EXTENDED RELEASE ORAL at 12:23

## 2020-06-06 RX ADMIN — PANTOPRAZOLE SODIUM 40 MG: 40 TABLET, DELAYED RELEASE ORAL at 06:31

## 2020-06-06 RX ADMIN — GABAPENTIN 100 MG: 100 CAPSULE ORAL at 09:08

## 2020-06-06 RX ADMIN — HEPARIN SODIUM (PORCINE) LOCK FLUSH IV SOLN 100 UNIT/ML 500 UNITS: 100 SOLUTION at 13:56

## 2020-06-06 RX ADMIN — SODIUM CHLORIDE, PRESERVATIVE FREE 2 ML: 5 INJECTION INTRAVENOUS at 10:00

## 2020-06-06 RX ADMIN — ISOSORBIDE DINITRATE 10 MG: 10 TABLET ORAL at 09:08

## 2020-06-06 RX ADMIN — GUAIFENESIN AND DEXTROMETHORPHAN HYDROBROMIDE 2 TABLET: 600; 30 TABLET, EXTENDED RELEASE ORAL at 09:07

## 2020-06-06 RX ADMIN — LEVOTHYROXINE SODIUM 50 MCG: 50 TABLET ORAL at 06:31

## 2020-06-06 RX ADMIN — HEPARIN SODIUM 5000 UNITS: 5000 INJECTION INTRAVENOUS; SUBCUTANEOUS at 06:31

## 2020-06-06 RX ADMIN — Medication 400 MG: at 09:08

## 2020-06-06 RX ADMIN — DICYCLOMINE HYDROCHLORIDE 10 MG: 10 CAPSULE ORAL at 12:25

## 2020-06-06 RX ADMIN — CALCIUM CARBONATE (ANTACID) CHEW TAB 500 MG 500 MG: 500 CHEW TAB at 09:10

## 2020-06-06 RX ADMIN — POLYETHYLENE GLYCOL 3350 34 G: 17 POWDER, FOR SOLUTION ORAL at 09:07

## 2020-06-06 RX ADMIN — CITALOPRAM HYDROBROMIDE 30 MG: 20 TABLET ORAL at 09:08

## 2020-06-06 ASSESSMENT — PAIN SCALES - GENERAL: PAINLEVEL_OUTOF10: 3

## 2020-06-07 ENCOUNTER — PATIENT OUTREACH (OUTPATIENT)
Dept: CASE MANAGEMENT | Age: 65
End: 2020-06-07

## 2020-06-08 ENCOUNTER — TELEPHONE (OUTPATIENT)
Dept: INTERNAL MEDICINE | Age: 65
End: 2020-06-08

## 2020-06-08 ENCOUNTER — TELEPHONE (OUTPATIENT)
Dept: NEPHROLOGY | Age: 65
End: 2020-06-08

## 2020-06-09 ENCOUNTER — OFFICE VISIT (OUTPATIENT)
Dept: INTERNAL MEDICINE | Age: 65
End: 2020-06-09

## 2020-06-09 VITALS
HEART RATE: 77 BPM | DIASTOLIC BLOOD PRESSURE: 84 MMHG | RESPIRATION RATE: 18 BRPM | HEIGHT: 65 IN | SYSTOLIC BLOOD PRESSURE: 132 MMHG | BODY MASS INDEX: 44.87 KG/M2 | WEIGHT: 269.29 LBS

## 2020-06-09 DIAGNOSIS — B96.89 ACUTE BACTERIAL BRONCHITIS: ICD-10-CM

## 2020-06-09 DIAGNOSIS — J20.8 ACUTE BACTERIAL BRONCHITIS: ICD-10-CM

## 2020-06-09 DIAGNOSIS — I10 ESSENTIAL HYPERTENSION WITH GOAL BLOOD PRESSURE LESS THAN 140/90: ICD-10-CM

## 2020-06-09 DIAGNOSIS — I20.1 PRINZMETAL ANGINA (CMD): ICD-10-CM

## 2020-06-09 DIAGNOSIS — J81.0 FLASH PULMONARY EDEMA (CMD): Primary | ICD-10-CM

## 2020-06-09 PROCEDURE — 99495 TRANSJ CARE MGMT MOD F2F 14D: CPT | Performed by: INTERNAL MEDICINE

## 2020-06-09 RX ORDER — LEVOFLOXACIN 750 MG/1
750 TABLET, FILM COATED ORAL DAILY
Qty: 10 TABLET | Refills: 0 | Status: SHIPPED | OUTPATIENT
Start: 2020-06-09 | End: 2020-06-19

## 2020-06-09 SDOH — ECONOMIC STABILITY: INCOME INSECURITY: HOW HARD IS IT FOR YOU TO PAY FOR THE VERY BASICS LIKE FOOD, HOUSING, MEDICAL CARE, AND HEATING?: PATIENT DECLINED

## 2020-06-09 SDOH — ECONOMIC STABILITY: FOOD INSECURITY: WITHIN THE PAST 12 MONTHS, THE FOOD YOU BOUGHT JUST DIDN'T LAST AND YOU DIDN'T HAVE MONEY TO GET MORE.: PATIENT DECLINED

## 2020-06-09 SDOH — ECONOMIC STABILITY: FOOD INSECURITY: WITHIN THE PAST 12 MONTHS, YOU WORRIED THAT YOUR FOOD WOULD RUN OUT BEFORE YOU GOT MONEY TO BUY MORE.: PATIENT DECLINED

## 2020-06-10 ENCOUNTER — OFFICE VISIT (OUTPATIENT)
Dept: NEPHROLOGY | Age: 65
End: 2020-06-10

## 2020-06-10 VITALS
SYSTOLIC BLOOD PRESSURE: 118 MMHG | DIASTOLIC BLOOD PRESSURE: 72 MMHG | HEART RATE: 72 BPM | BODY MASS INDEX: 44.1 KG/M2 | WEIGHT: 265 LBS

## 2020-06-10 DIAGNOSIS — N18.30 CHRONIC KIDNEY DISEASE, STAGE III (MODERATE) (CMD): Primary | ICD-10-CM

## 2020-06-10 DIAGNOSIS — N20.0 NEPHROLITHIASIS: ICD-10-CM

## 2020-06-10 DIAGNOSIS — I10 ESSENTIAL HYPERTENSION WITH GOAL BLOOD PRESSURE LESS THAN 140/90: ICD-10-CM

## 2020-06-10 PROCEDURE — 81000 URINALYSIS NONAUTO W/SCOPE: CPT | Performed by: INTERNAL MEDICINE

## 2020-06-10 PROCEDURE — 99214 OFFICE O/P EST MOD 30 MIN: CPT | Performed by: INTERNAL MEDICINE

## 2020-06-10 RX ORDER — TORSEMIDE 20 MG/1
40 TABLET ORAL DAILY
Qty: 60 TABLET | Refills: 11 | Status: SHIPPED | OUTPATIENT
Start: 2020-06-10 | End: 2020-06-13 | Stop reason: ALTCHOICE

## 2020-06-11 ENCOUNTER — TELEPHONE (OUTPATIENT)
Dept: ALLERGY | Age: 65
End: 2020-06-11

## 2020-06-11 LAB
KETONES UR STRIP-MCNC: ABNORMAL MG/DL
URINE BILI: ABNORMAL
URINE BLOOD: ABNORMAL
URINE CASTS: ABNORMAL
URINE CHARACTER: CLEAR
URINE COLOR: ABNORMAL
URINE GLUCOSE: ABNORMAL
URINE LEUKO: ABNORMAL
URINE NITRITE: ABNORMAL
URINE PH: 6 (ref 5–8)
URINE PROTEIN: ABNORMAL
URINE RBC: ABNORMAL
URINE SPECIFIC GRAVITY: 1 (ref 1.01–1.03)
URINE WBC: ABNORMAL

## 2020-06-11 RX ORDER — CODEINE PHOSPHATE AND GUAIFENESIN 10; 100 MG/5ML; MG/5ML
5 SOLUTION ORAL 3 TIMES DAILY PRN
Qty: 240 ML | Refills: 0 | Status: CANCELLED | OUTPATIENT
Start: 2020-06-11

## 2020-06-12 ENCOUNTER — TELEPHONE (OUTPATIENT)
Dept: INTERNAL MEDICINE | Age: 65
End: 2020-06-12

## 2020-06-12 RX ORDER — PREDNISONE 20 MG/1
TABLET ORAL
Qty: 20 TABLET | Refills: 1 | Status: SHIPPED | OUTPATIENT
Start: 2020-06-12

## 2020-06-12 RX ORDER — CODEINE PHOSPHATE AND GUAIFENESIN 10; 100 MG/5ML; MG/5ML
5 SOLUTION ORAL 3 TIMES DAILY PRN
Qty: 240 ML | Refills: 0 | Status: SHIPPED | OUTPATIENT
Start: 2020-06-12 | End: 2020-06-30 | Stop reason: SDUPTHER

## 2020-06-13 RX ORDER — FUROSEMIDE 40 MG/1
TABLET ORAL
Qty: 120 TABLET | Refills: 11 | Status: SHIPPED | OUTPATIENT
Start: 2020-06-13 | End: 2020-10-30 | Stop reason: SDUPTHER

## 2020-06-16 ENCOUNTER — PATIENT OUTREACH (OUTPATIENT)
Dept: CASE MANAGEMENT | Age: 65
End: 2020-06-16

## 2020-06-17 ENCOUNTER — E-ADVICE (OUTPATIENT)
Dept: INTERNAL MEDICINE | Age: 65
End: 2020-06-17

## 2020-06-23 ENCOUNTER — E-ADVICE (OUTPATIENT)
Dept: INTERNAL MEDICINE | Age: 65
End: 2020-06-23

## 2020-06-23 ENCOUNTER — PATIENT OUTREACH (OUTPATIENT)
Dept: CASE MANAGEMENT | Age: 65
End: 2020-06-23

## 2020-06-23 DIAGNOSIS — E03.9 HYPOTHYROIDISM, UNSPECIFIED TYPE: Primary | ICD-10-CM

## 2020-06-24 RX ORDER — LEVOTHYROXINE SODIUM 0.05 MG/1
50 TABLET ORAL DAILY
Qty: 90 TABLET | Refills: 0 | Status: SHIPPED | OUTPATIENT
Start: 2020-06-24 | End: 2020-09-24 | Stop reason: SDUPTHER

## 2020-06-24 RX ORDER — POTASSIUM CHLORIDE 750 MG/1
20 TABLET, EXTENDED RELEASE ORAL 2 TIMES DAILY
Qty: 360 TABLET | Refills: 1 | Status: SHIPPED | OUTPATIENT
Start: 2020-06-24 | End: 2020-10-30 | Stop reason: SDUPTHER

## 2020-06-26 ENCOUNTER — TELEPHONE (OUTPATIENT)
Dept: OPHTHALMOLOGY | Age: 65
End: 2020-06-26

## 2020-06-29 ENCOUNTER — E-ADVICE (OUTPATIENT)
Dept: INTERNAL MEDICINE | Age: 65
End: 2020-06-29

## 2020-06-30 RX ORDER — CODEINE PHOSPHATE AND GUAIFENESIN 10; 100 MG/5ML; MG/5ML
5 SOLUTION ORAL 3 TIMES DAILY PRN
Qty: 240 ML | Refills: 0 | Status: SHIPPED | OUTPATIENT
Start: 2020-06-30 | End: 2020-07-29 | Stop reason: SDUPTHER

## 2020-06-30 RX ORDER — LEVOFLOXACIN 750 MG/1
750 TABLET, FILM COATED ORAL DAILY
Qty: 10 TABLET | Refills: 0 | Status: SHIPPED | OUTPATIENT
Start: 2020-06-30 | End: 2020-07-10

## 2020-07-02 ENCOUNTER — TELEPHONE (OUTPATIENT)
Dept: NEPHROLOGY | Age: 65
End: 2020-07-02

## 2020-07-02 ENCOUNTER — OFFICE VISIT (OUTPATIENT)
Dept: HEMATOLOGY/ONCOLOGY | Age: 65
End: 2020-07-02
Attending: INTERNAL MEDICINE

## 2020-07-02 ENCOUNTER — LAB SERVICES (OUTPATIENT)
Dept: LAB | Age: 65
End: 2020-07-02
Attending: ALLERGY & IMMUNOLOGY

## 2020-07-02 DIAGNOSIS — D80.1 HYPOGAMMAGLOBULINEMIA (CMD): Primary | ICD-10-CM

## 2020-07-02 DIAGNOSIS — N18.30 CHRONIC KIDNEY DISEASE, STAGE III (MODERATE) (CMD): ICD-10-CM

## 2020-07-02 DIAGNOSIS — D83.9 COMMON VARIABLE IMMUNODEFICIENCY (CMD): ICD-10-CM

## 2020-07-02 DIAGNOSIS — N18.30 CHRONIC KIDNEY DISEASE, STAGE III (MODERATE) (CMD): Primary | ICD-10-CM

## 2020-07-02 DIAGNOSIS — E03.9 HYPOTHYROIDISM, UNSPECIFIED TYPE: ICD-10-CM

## 2020-07-02 LAB
ANION GAP SERPL CALC-SCNC: 15 MMOL/L (ref 10–20)
BUN SERPL-MCNC: 23 MG/DL (ref 6–20)
BUN/CREAT SERPL: 13 (ref 7–25)
CALCIUM SERPL-MCNC: 8.5 MG/DL (ref 8.4–10.2)
CHLORIDE SERPL-SCNC: 100 MMOL/L (ref 98–107)
CO2 SERPL-SCNC: 24 MMOL/L (ref 21–32)
CREAT SERPL-MCNC: 1.84 MG/DL (ref 0.51–0.95)
FASTING DURATION TIME PATIENT: ABNORMAL H
GFR SERPLBLD BASED ON 1.73 SQ M-ARVRAT: 29 ML/MIN/1.73M2
GLUCOSE SERPL-MCNC: 190 MG/DL (ref 65–99)
POTASSIUM SERPL-SCNC: 4.8 MMOL/L (ref 3.4–5.1)
SODIUM SERPL-SCNC: 134 MMOL/L (ref 135–145)
TSH SERPL-ACNC: 1.79 MCUNITS/ML (ref 0.35–5)

## 2020-07-02 PROCEDURE — 10002807 HB RX 258: Performed by: ALLERGY & IMMUNOLOGY

## 2020-07-02 PROCEDURE — 96365 THER/PROPH/DIAG IV INF INIT: CPT

## 2020-07-02 PROCEDURE — 80048 BASIC METABOLIC PNL TOTAL CA: CPT

## 2020-07-02 PROCEDURE — 84443 ASSAY THYROID STIM HORMONE: CPT

## 2020-07-02 PROCEDURE — A4212 NON CORING NEEDLE OR STYLET: HCPCS

## 2020-07-02 PROCEDURE — 36592 COLLECT BLOOD FROM PICC: CPT

## 2020-07-02 PROCEDURE — 96366 THER/PROPH/DIAG IV INF ADDON: CPT

## 2020-07-02 PROCEDURE — 10002800 HB RX 250 W HCPCS: Performed by: ALLERGY & IMMUNOLOGY

## 2020-07-02 RX ORDER — IMMUNE GLOBULIN INFUSION (HUMAN) 100 MG/ML
25 INJECTION, SOLUTION INTRAVENOUS; SUBCUTANEOUS ONCE
Status: COMPLETED | OUTPATIENT
Start: 2020-07-02 | End: 2020-07-02

## 2020-07-02 RX ORDER — DIPHENHYDRAMINE HCL 25 MG
25 CAPSULE ORAL ONCE
Status: CANCELLED | OUTPATIENT
Start: 2020-10-01

## 2020-07-02 RX ORDER — 0.9 % SODIUM CHLORIDE 0.9 %
2 VIAL (ML) INJECTION PRN
Status: CANCELLED | OUTPATIENT
Start: 2020-10-01

## 2020-07-02 RX ORDER — HEPARIN 100 UNIT/ML
5 SYRINGE INTRAVENOUS PRN
Status: DISCONTINUED | OUTPATIENT
Start: 2020-07-02 | End: 2020-07-02 | Stop reason: HOSPADM

## 2020-07-02 RX ORDER — HEPARIN 100 UNIT/ML
5 SYRINGE INTRAVENOUS PRN
Status: CANCELLED | OUTPATIENT
Start: 2020-07-02

## 2020-07-02 RX ORDER — IMMUNE GLOBULIN INFUSION (HUMAN) 100 MG/ML
25 INJECTION, SOLUTION INTRAVENOUS; SUBCUTANEOUS ONCE
Status: CANCELLED | OUTPATIENT
Start: 2020-10-01

## 2020-07-02 RX ORDER — ACETAMINOPHEN 325 MG/1
650 TABLET ORAL ONCE
Status: CANCELLED | OUTPATIENT
Start: 2020-10-01

## 2020-07-02 RX ADMIN — SODIUM CHLORIDE 250 ML: 9 INJECTION, SOLUTION INTRAVENOUS at 09:05

## 2020-07-02 RX ADMIN — IMMUNE GLOBULIN INFUSION (HUMAN) 25 G: 100 INJECTION, SOLUTION INTRAVENOUS; SUBCUTANEOUS at 09:11

## 2020-07-02 RX ADMIN — Medication 500 UNITS: at 14:37

## 2020-07-02 ASSESSMENT — PAIN SCALES - GENERAL: PAINLEVEL: 0

## 2020-07-06 ENCOUNTER — PATIENT OUTREACH (OUTPATIENT)
Dept: CASE MANAGEMENT | Age: 65
End: 2020-07-06

## 2020-07-07 ENCOUNTER — TELEPHONE (OUTPATIENT)
Dept: INTERNAL MEDICINE | Age: 65
End: 2020-07-07

## 2020-07-10 ENCOUNTER — OFFICE VISIT (OUTPATIENT)
Dept: CARDIOLOGY | Age: 65
End: 2020-07-10

## 2020-07-10 VITALS
BODY MASS INDEX: 44.83 KG/M2 | HEART RATE: 80 BPM | SYSTOLIC BLOOD PRESSURE: 126 MMHG | DIASTOLIC BLOOD PRESSURE: 72 MMHG | WEIGHT: 269.4 LBS | RESPIRATION RATE: 16 BRPM

## 2020-07-10 DIAGNOSIS — I25.10 CORONARY ARTERY DISEASE INVOLVING NATIVE CORONARY ARTERY OF NATIVE HEART WITHOUT ANGINA PECTORIS: Primary | ICD-10-CM

## 2020-07-10 DIAGNOSIS — I10 ESSENTIAL HYPERTENSION WITH GOAL BLOOD PRESSURE LESS THAN 140/90: ICD-10-CM

## 2020-07-10 DIAGNOSIS — I06.2 AORTIC VALVE STENOSIS AND INSUFFICIENCY, RHEUMATIC: ICD-10-CM

## 2020-07-10 DIAGNOSIS — I34.0 MITRAL VALVE INSUFFICIENCY, UNSPECIFIED ETIOLOGY: ICD-10-CM

## 2020-07-10 DIAGNOSIS — I47.11 INAPPROPRIATE SINUS TACHYCARDIA: ICD-10-CM

## 2020-07-10 DIAGNOSIS — E78.00 PURE HYPERCHOLESTEROLEMIA: ICD-10-CM

## 2020-07-10 DIAGNOSIS — I20.1 PRINZMETAL ANGINA (CMD): ICD-10-CM

## 2020-07-10 DIAGNOSIS — I71.20 THORACIC AORTIC ANEURYSM WITHOUT RUPTURE (CMD): ICD-10-CM

## 2020-07-10 PROCEDURE — 99213 OFFICE O/P EST LOW 20 MIN: CPT | Performed by: INTERNAL MEDICINE

## 2020-07-13 ENCOUNTER — TELEPHONE (OUTPATIENT)
Dept: ALLERGY | Age: 65
End: 2020-07-13

## 2020-07-15 ENCOUNTER — OFFICE VISIT (OUTPATIENT)
Dept: PULMONOLOGY | Age: 65
End: 2020-07-15

## 2020-07-15 ENCOUNTER — TELEPHONE (OUTPATIENT)
Dept: SURGERY | Age: 65
End: 2020-07-15

## 2020-07-15 ENCOUNTER — E-ADVICE (OUTPATIENT)
Dept: INTERNAL MEDICINE | Age: 65
End: 2020-07-15

## 2020-07-15 VITALS
BODY MASS INDEX: 44.1 KG/M2 | HEART RATE: 68 BPM | OXYGEN SATURATION: 99 % | SYSTOLIC BLOOD PRESSURE: 119 MMHG | WEIGHT: 265 LBS | DIASTOLIC BLOOD PRESSURE: 39 MMHG

## 2020-07-15 DIAGNOSIS — J32.4 CHRONIC PANSINUSITIS: ICD-10-CM

## 2020-07-15 DIAGNOSIS — J45.51 SEVERE PERSISTENT ASTHMA WITH EXACERBATION: Primary | ICD-10-CM

## 2020-07-15 DIAGNOSIS — J98.01 ACUTE BRONCHOSPASM: ICD-10-CM

## 2020-07-15 DIAGNOSIS — D83.9 COMMON VARIABLE IMMUNODEFICIENCY (CMD): ICD-10-CM

## 2020-07-15 PROBLEM — R06.02 SHORTNESS OF BREATH: Status: RESOLVED | Noted: 2018-12-11 | Resolved: 2020-07-15

## 2020-07-15 PROCEDURE — 99214 OFFICE O/P EST MOD 30 MIN: CPT | Performed by: INTERNAL MEDICINE

## 2020-07-20 ENCOUNTER — IMAGING SERVICES (OUTPATIENT)
Dept: GENERAL RADIOLOGY | Age: 65
End: 2020-07-20
Attending: PHYSICIAN ASSISTANT

## 2020-07-20 ENCOUNTER — OFFICE VISIT (OUTPATIENT)
Dept: ORTHOPEDICS | Age: 65
End: 2020-07-20

## 2020-07-20 DIAGNOSIS — M19.011 ARTHRITIS OF RIGHT SHOULDER REGION: ICD-10-CM

## 2020-07-20 DIAGNOSIS — M75.81 TENDINITIS OF RIGHT ROTATOR CUFF: ICD-10-CM

## 2020-07-20 DIAGNOSIS — M25.511 RIGHT SHOULDER PAIN, UNSPECIFIED CHRONICITY: Primary | ICD-10-CM

## 2020-07-20 DIAGNOSIS — M25.511 RIGHT SHOULDER PAIN, UNSPECIFIED CHRONICITY: ICD-10-CM

## 2020-07-20 PROCEDURE — 73030 X-RAY EXAM OF SHOULDER: CPT | Performed by: RADIOLOGY

## 2020-07-20 PROCEDURE — 20610 DRAIN/INJ JOINT/BURSA W/O US: CPT | Performed by: PHYSICIAN ASSISTANT

## 2020-07-20 PROCEDURE — 99213 OFFICE O/P EST LOW 20 MIN: CPT | Performed by: PHYSICIAN ASSISTANT

## 2020-07-20 RX ORDER — BETAMETHASONE SODIUM PHOSPHATE AND BETAMETHASONE ACETATE 3; 3 MG/ML; MG/ML
12 INJECTION, SUSPENSION INTRA-ARTICULAR; INTRALESIONAL; INTRAMUSCULAR; SOFT TISSUE ONCE
Status: COMPLETED | OUTPATIENT
Start: 2020-07-20 | End: 2020-07-20

## 2020-07-20 RX ADMIN — BETAMETHASONE SODIUM PHOSPHATE AND BETAMETHASONE ACETATE 12 MG: 3; 3 INJECTION, SUSPENSION INTRA-ARTICULAR; INTRALESIONAL; INTRAMUSCULAR; SOFT TISSUE at 11:21

## 2020-07-22 ENCOUNTER — OFFICE VISIT (OUTPATIENT)
Dept: NEUROLOGY | Age: 65
End: 2020-07-22

## 2020-07-22 VITALS — SYSTOLIC BLOOD PRESSURE: 110 MMHG | HEART RATE: 63 BPM | DIASTOLIC BLOOD PRESSURE: 68 MMHG

## 2020-07-22 DIAGNOSIS — G31.84 MILD COGNITIVE IMPAIRMENT WITH MEMORY LOSS: ICD-10-CM

## 2020-07-22 DIAGNOSIS — R25.1 TREMOR: ICD-10-CM

## 2020-07-22 DIAGNOSIS — G43.009 MIGRAINE WITHOUT AURA AND WITHOUT STATUS MIGRAINOSUS, NOT INTRACTABLE: Primary | ICD-10-CM

## 2020-07-22 PROCEDURE — 99213 OFFICE O/P EST LOW 20 MIN: CPT | Performed by: PSYCHIATRY & NEUROLOGY

## 2020-07-22 RX ORDER — AMITRIPTYLINE HYDROCHLORIDE 10 MG/1
10 TABLET, FILM COATED ORAL NIGHTLY
Qty: 30 TABLET | Refills: 3 | Status: SHIPPED | OUTPATIENT
Start: 2020-07-22 | End: 2020-09-02 | Stop reason: SDUPTHER

## 2020-07-29 RX ORDER — CODEINE PHOSPHATE AND GUAIFENESIN 10; 100 MG/5ML; MG/5ML
5 SOLUTION ORAL 3 TIMES DAILY PRN
Qty: 240 ML | Refills: 0 | Status: SHIPPED | OUTPATIENT
Start: 2020-07-29 | End: 2020-08-24 | Stop reason: SDUPTHER

## 2020-07-30 ENCOUNTER — E-ADVICE (OUTPATIENT)
Dept: ORTHOPEDICS | Age: 65
End: 2020-07-30

## 2020-07-30 ENCOUNTER — OFFICE VISIT (OUTPATIENT)
Dept: HEMATOLOGY/ONCOLOGY | Age: 65
End: 2020-07-30
Attending: INTERNAL MEDICINE

## 2020-07-30 DIAGNOSIS — M25.511 RIGHT SHOULDER PAIN, UNSPECIFIED CHRONICITY: Primary | ICD-10-CM

## 2020-07-30 DIAGNOSIS — D80.1 HYPOGAMMAGLOBULINEMIA (CMD): Primary | ICD-10-CM

## 2020-07-30 DIAGNOSIS — M75.81 TENDINITIS OF RIGHT ROTATOR CUFF: ICD-10-CM

## 2020-07-30 DIAGNOSIS — M19.011 ARTHRITIS OF RIGHT SHOULDER REGION: ICD-10-CM

## 2020-07-30 DIAGNOSIS — D83.9 COMMON VARIABLE IMMUNODEFICIENCY (CMD): ICD-10-CM

## 2020-07-30 PROCEDURE — 10002807 HB RX 258: Performed by: ALLERGY & IMMUNOLOGY

## 2020-07-30 PROCEDURE — 10002800 HB RX 250 W HCPCS: Performed by: ALLERGY & IMMUNOLOGY

## 2020-07-30 PROCEDURE — A4212 NON CORING NEEDLE OR STYLET: HCPCS

## 2020-07-30 PROCEDURE — 96366 THER/PROPH/DIAG IV INF ADDON: CPT

## 2020-07-30 PROCEDURE — 96365 THER/PROPH/DIAG IV INF INIT: CPT

## 2020-07-30 RX ORDER — ACETAMINOPHEN 325 MG/1
650 TABLET ORAL ONCE
Status: CANCELLED | OUTPATIENT
Start: 2020-10-01

## 2020-07-30 RX ORDER — HEPARIN 100 UNIT/ML
5 SYRINGE INTRAVENOUS PRN
Status: DISCONTINUED | OUTPATIENT
Start: 2020-07-30 | End: 2020-07-30 | Stop reason: HOSPADM

## 2020-07-30 RX ORDER — HEPARIN 100 UNIT/ML
5 SYRINGE INTRAVENOUS PRN
Status: CANCELLED | OUTPATIENT
Start: 2020-07-30

## 2020-07-30 RX ORDER — DIPHENHYDRAMINE HCL 25 MG
25 CAPSULE ORAL ONCE
Status: CANCELLED | OUTPATIENT
Start: 2020-10-01

## 2020-07-30 RX ORDER — IMMUNE GLOBULIN INFUSION (HUMAN) 100 MG/ML
25 INJECTION, SOLUTION INTRAVENOUS; SUBCUTANEOUS ONCE
Status: COMPLETED | OUTPATIENT
Start: 2020-07-30 | End: 2020-07-30

## 2020-07-30 RX ORDER — IMMUNE GLOBULIN INFUSION (HUMAN) 100 MG/ML
25 INJECTION, SOLUTION INTRAVENOUS; SUBCUTANEOUS ONCE
Status: CANCELLED | OUTPATIENT
Start: 2020-10-01

## 2020-07-30 RX ORDER — 0.9 % SODIUM CHLORIDE 0.9 %
2 VIAL (ML) INJECTION PRN
Status: CANCELLED | OUTPATIENT
Start: 2020-10-01

## 2020-07-30 RX ADMIN — Medication 500 UNITS: at 14:13

## 2020-07-30 RX ADMIN — IMMUNE GLOBULIN INFUSION (HUMAN) 25 G: 100 INJECTION, SOLUTION INTRAVENOUS; SUBCUTANEOUS at 08:48

## 2020-07-30 RX ADMIN — SODIUM CHLORIDE 250 ML: 9 INJECTION, SOLUTION INTRAVENOUS at 08:35

## 2020-07-30 ASSESSMENT — PAIN SCALES - GENERAL: PAINLEVEL: 5-6

## 2020-08-03 ENCOUNTER — HOSPITAL ENCOUNTER (OUTPATIENT)
Dept: REHABILITATION | Age: 65
Discharge: STILL ADMITTED OR ADMINISTRATIVE | End: 2020-08-03
Attending: PHYSICIAN ASSISTANT

## 2020-08-03 DIAGNOSIS — M25.511 RIGHT SHOULDER PAIN, UNSPECIFIED CHRONICITY: ICD-10-CM

## 2020-08-03 DIAGNOSIS — M19.011 ARTHRITIS OF RIGHT SHOULDER REGION: ICD-10-CM

## 2020-08-03 DIAGNOSIS — M75.81 TENDINITIS OF RIGHT ROTATOR CUFF: ICD-10-CM

## 2020-08-03 PROCEDURE — 97162 PT EVAL MOD COMPLEX 30 MIN: CPT | Performed by: PHYSICAL THERAPIST

## 2020-08-03 PROCEDURE — 10004173 HB COUNTER-THERAPY VISIT PT: Performed by: PHYSICAL THERAPIST

## 2020-08-03 PROCEDURE — 97110 THERAPEUTIC EXERCISES: CPT | Performed by: PHYSICAL THERAPIST

## 2020-08-03 ASSESSMENT — ENCOUNTER SYMPTOMS
QUALITY: RADIATING
QUALITY: SHARP
PAIN SCALE AT HIGHEST: 7
QUALITY: ACHE
PAIN SCALE AT LOWEST: 1
SUBJECTIVE PAIN PROGRESSION: WORSENING
PAIN SEVERITY NOW: 1
ALLEVIATING FACTORS: CHANGE IN POSITION
ALLEVIATING FACTORS: OVER-THE-COUNTER MEDICATION

## 2020-08-05 ENCOUNTER — PREP FOR CASE (OUTPATIENT)
Dept: SURGERY | Age: 65
End: 2020-08-05

## 2020-08-05 ENCOUNTER — HOSPITAL ENCOUNTER (OUTPATIENT)
Dept: REHABILITATION | Age: 65
Discharge: STILL ADMITTED OR ADMINISTRATIVE | End: 2020-08-05
Attending: PHYSICIAN ASSISTANT

## 2020-08-05 ENCOUNTER — TELEPHONE (OUTPATIENT)
Dept: SURGERY | Age: 65
End: 2020-08-05

## 2020-08-05 DIAGNOSIS — Z86.010 PERSONAL HISTORY OF COLONIC POLYPS: Primary | ICD-10-CM

## 2020-08-05 PROCEDURE — 97140 MANUAL THERAPY 1/> REGIONS: CPT | Performed by: PHYSICAL THERAPIST

## 2020-08-05 PROCEDURE — 10004173 HB COUNTER-THERAPY VISIT PT: Performed by: PHYSICAL THERAPIST

## 2020-08-05 PROCEDURE — 97110 THERAPEUTIC EXERCISES: CPT | Performed by: PHYSICAL THERAPIST

## 2020-08-05 ASSESSMENT — ENCOUNTER SYMPTOMS: PAIN SEVERITY NOW: 6

## 2020-08-07 RX ORDER — MONTELUKAST SODIUM 10 MG/1
10 TABLET ORAL EVERY EVENING
Qty: 30 TABLET | Refills: 6 | Status: SHIPPED | OUTPATIENT
Start: 2020-08-07 | End: 2020-10-30 | Stop reason: SDUPTHER

## 2020-08-10 ENCOUNTER — HOSPITAL ENCOUNTER (OUTPATIENT)
Dept: REHABILITATION | Age: 65
Discharge: STILL ADMITTED OR ADMINISTRATIVE | End: 2020-08-10
Attending: PHYSICIAN ASSISTANT

## 2020-08-10 PROCEDURE — 97110 THERAPEUTIC EXERCISES: CPT | Performed by: PHYSICAL THERAPIST

## 2020-08-10 PROCEDURE — 97035 APP MDLTY 1+ULTRASOUND EA 15: CPT | Performed by: PHYSICAL THERAPIST

## 2020-08-10 PROCEDURE — 10004173 HB COUNTER-THERAPY VISIT PT: Performed by: PHYSICAL THERAPIST

## 2020-08-10 ASSESSMENT — ENCOUNTER SYMPTOMS: PAIN SEVERITY NOW: 4

## 2020-08-12 ENCOUNTER — HOSPITAL ENCOUNTER (OUTPATIENT)
Dept: REHABILITATION | Age: 65
Discharge: STILL ADMITTED OR ADMINISTRATIVE | End: 2020-08-12
Attending: PHYSICIAN ASSISTANT

## 2020-08-12 PROCEDURE — 97140 MANUAL THERAPY 1/> REGIONS: CPT | Performed by: PHYSICAL THERAPIST

## 2020-08-12 PROCEDURE — 97035 APP MDLTY 1+ULTRASOUND EA 15: CPT | Performed by: PHYSICAL THERAPIST

## 2020-08-12 PROCEDURE — 97110 THERAPEUTIC EXERCISES: CPT | Performed by: PHYSICAL THERAPIST

## 2020-08-12 PROCEDURE — 10004173 HB COUNTER-THERAPY VISIT PT: Performed by: PHYSICAL THERAPIST

## 2020-08-12 ASSESSMENT — ENCOUNTER SYMPTOMS: PAIN SEVERITY NOW: 6

## 2020-08-13 ENCOUNTER — TELEPHONE (OUTPATIENT)
Dept: PULMONOLOGY | Age: 65
End: 2020-08-13

## 2020-08-13 RX ORDER — ISOSORBIDE DINITRATE 10 MG/1
10 TABLET ORAL 3 TIMES DAILY
Qty: 270 TABLET | Refills: 3 | Status: SHIPPED | OUTPATIENT
Start: 2020-08-13

## 2020-08-13 RX ORDER — OMEPRAZOLE 40 MG/1
40 CAPSULE, DELAYED RELEASE ORAL 2 TIMES DAILY
Qty: 180 CAPSULE | Refills: 1 | OUTPATIENT
Start: 2020-08-13

## 2020-08-13 RX ORDER — POTASSIUM CITRATE 10 MEQ/1
10 TABLET, EXTENDED RELEASE ORAL
Qty: 30 TABLET | Refills: 2 | Status: SHIPPED | OUTPATIENT
Start: 2020-08-13 | End: 2020-10-30 | Stop reason: SDUPTHER

## 2020-08-17 ENCOUNTER — APPOINTMENT (OUTPATIENT)
Dept: REHABILITATION | Age: 65
End: 2020-08-17
Attending: PHYSICIAN ASSISTANT

## 2020-08-18 ENCOUNTER — TELEPHONE (OUTPATIENT)
Dept: PULMONOLOGY | Age: 65
End: 2020-08-18

## 2020-08-19 ENCOUNTER — APPOINTMENT (OUTPATIENT)
Dept: REHABILITATION | Age: 65
End: 2020-08-19
Attending: PHYSICIAN ASSISTANT

## 2020-08-24 ENCOUNTER — APPOINTMENT (OUTPATIENT)
Dept: REHABILITATION | Age: 65
End: 2020-08-24
Attending: PHYSICIAN ASSISTANT

## 2020-08-24 DIAGNOSIS — I10 ESSENTIAL HYPERTENSION WITH GOAL BLOOD PRESSURE LESS THAN 140/90: ICD-10-CM

## 2020-08-24 RX ORDER — CODEINE PHOSPHATE AND GUAIFENESIN 10; 100 MG/5ML; MG/5ML
5 SOLUTION ORAL 3 TIMES DAILY PRN
Qty: 240 ML | Refills: 0 | Status: SHIPPED | OUTPATIENT
Start: 2020-08-24 | End: 2020-09-08 | Stop reason: SDUPTHER

## 2020-08-24 RX ORDER — CODEINE PHOSPHATE AND GUAIFENESIN 10; 100 MG/5ML; MG/5ML
5 SOLUTION ORAL 3 TIMES DAILY PRN
Qty: 240 ML | Refills: 0 | Status: CANCELLED | OUTPATIENT
Start: 2020-08-24

## 2020-08-25 RX ORDER — OMEPRAZOLE 40 MG/1
40 CAPSULE, DELAYED RELEASE ORAL 2 TIMES DAILY
Qty: 180 CAPSULE | Refills: 1 | OUTPATIENT
Start: 2020-08-25

## 2020-08-25 RX ORDER — PRAZOSIN HYDROCHLORIDE 2 MG/1
2 CAPSULE ORAL NIGHTLY
Qty: 90 CAPSULE | Refills: 2 | Status: SHIPPED | OUTPATIENT
Start: 2020-08-25 | End: 2020-10-30 | Stop reason: SDUPTHER

## 2020-08-26 ENCOUNTER — APPOINTMENT (OUTPATIENT)
Dept: REHABILITATION | Age: 65
End: 2020-08-26
Attending: PHYSICIAN ASSISTANT

## 2020-08-27 ENCOUNTER — OFFICE VISIT (OUTPATIENT)
Dept: HEMATOLOGY/ONCOLOGY | Age: 65
End: 2020-08-27
Attending: INTERNAL MEDICINE

## 2020-08-27 VITALS
HEART RATE: 81 BPM | OXYGEN SATURATION: 95 % | SYSTOLIC BLOOD PRESSURE: 135 MMHG | DIASTOLIC BLOOD PRESSURE: 69 MMHG | TEMPERATURE: 97.7 F | RESPIRATION RATE: 16 BRPM

## 2020-08-27 DIAGNOSIS — D83.9 COMMON VARIABLE IMMUNODEFICIENCY (CMD): ICD-10-CM

## 2020-08-27 DIAGNOSIS — D80.1 HYPOGAMMAGLOBULINEMIA (CMD): Primary | ICD-10-CM

## 2020-08-27 PROCEDURE — 10002800 HB RX 250 W HCPCS: Performed by: ALLERGY & IMMUNOLOGY

## 2020-08-27 PROCEDURE — 10004742 HB COUNTER VISIT MASSAGE: Performed by: MASSAGE THERAPIST

## 2020-08-27 PROCEDURE — 96365 THER/PROPH/DIAG IV INF INIT: CPT

## 2020-08-27 PROCEDURE — A4212 NON CORING NEEDLE OR STYLET: HCPCS

## 2020-08-27 PROCEDURE — 10002807 HB RX 258: Performed by: ALLERGY & IMMUNOLOGY

## 2020-08-27 PROCEDURE — 96366 THER/PROPH/DIAG IV INF ADDON: CPT

## 2020-08-27 RX ORDER — IMMUNE GLOBULIN INFUSION (HUMAN) 100 MG/ML
25 INJECTION, SOLUTION INTRAVENOUS; SUBCUTANEOUS ONCE
Status: CANCELLED | OUTPATIENT
Start: 2020-10-01

## 2020-08-27 RX ORDER — IMMUNE GLOBULIN INFUSION (HUMAN) 100 MG/ML
25 INJECTION, SOLUTION INTRAVENOUS; SUBCUTANEOUS ONCE
Status: COMPLETED | OUTPATIENT
Start: 2020-08-27 | End: 2020-08-27

## 2020-08-27 RX ORDER — ACETAMINOPHEN 325 MG/1
650 TABLET ORAL ONCE
Status: CANCELLED | OUTPATIENT
Start: 2020-10-01

## 2020-08-27 RX ORDER — HEPARIN 100 UNIT/ML
5 SYRINGE INTRAVENOUS PRN
Status: CANCELLED | OUTPATIENT
Start: 2020-08-27

## 2020-08-27 RX ORDER — HEPARIN 100 UNIT/ML
5 SYRINGE INTRAVENOUS PRN
Status: DISCONTINUED | OUTPATIENT
Start: 2020-08-27 | End: 2020-08-27 | Stop reason: HOSPADM

## 2020-08-27 RX ORDER — 0.9 % SODIUM CHLORIDE 0.9 %
2 VIAL (ML) INJECTION PRN
Status: CANCELLED | OUTPATIENT
Start: 2020-10-01

## 2020-08-27 RX ORDER — DIPHENHYDRAMINE HCL 25 MG
25 CAPSULE ORAL ONCE
Status: CANCELLED | OUTPATIENT
Start: 2020-10-01

## 2020-08-27 RX ADMIN — Medication 500 UNITS: at 14:50

## 2020-08-27 RX ADMIN — SODIUM CHLORIDE 250 ML: 9 INJECTION, SOLUTION INTRAVENOUS at 09:04

## 2020-08-27 RX ADMIN — IMMUNE GLOBULIN INFUSION (HUMAN) 25 G: 100 INJECTION, SOLUTION INTRAVENOUS; SUBCUTANEOUS at 09:05

## 2020-08-28 ENCOUNTER — TELEPHONE (OUTPATIENT)
Dept: INTERNAL MEDICINE | Age: 65
End: 2020-08-28

## 2020-08-28 RX ORDER — LEVOFLOXACIN 750 MG/1
750 TABLET, FILM COATED ORAL DAILY
Qty: 10 TABLET | Refills: 0 | Status: SHIPPED | OUTPATIENT
Start: 2020-08-28 | End: 2020-09-08

## 2020-08-28 RX ORDER — OMEPRAZOLE 40 MG/1
40 CAPSULE, DELAYED RELEASE ORAL 2 TIMES DAILY
Qty: 180 CAPSULE | Refills: 1 | OUTPATIENT
Start: 2020-08-28

## 2020-08-31 ENCOUNTER — E-ADVICE (OUTPATIENT)
Dept: INTERNAL MEDICINE | Age: 65
End: 2020-08-31

## 2020-09-01 ENCOUNTER — E-ADVICE (OUTPATIENT)
Dept: NEUROLOGY | Age: 65
End: 2020-09-01

## 2020-09-01 RX ORDER — PREDNISONE 20 MG/1
TABLET ORAL
Qty: 15 TABLET | Refills: 0 | Status: SHIPPED | OUTPATIENT
Start: 2020-09-01

## 2020-09-01 RX ORDER — OMEPRAZOLE 40 MG/1
40 CAPSULE, DELAYED RELEASE ORAL 2 TIMES DAILY
Qty: 180 CAPSULE | Refills: 1 | OUTPATIENT
Start: 2020-09-01

## 2020-09-02 ENCOUNTER — E-ADVICE (OUTPATIENT)
Dept: INTERNAL MEDICINE | Age: 65
End: 2020-09-02

## 2020-09-02 ENCOUNTER — E-ADVICE (OUTPATIENT)
Dept: ALLERGY | Age: 65
End: 2020-09-02

## 2020-09-02 RX ORDER — OMEPRAZOLE 40 MG/1
40 CAPSULE, DELAYED RELEASE ORAL 2 TIMES DAILY
Qty: 180 CAPSULE | Refills: 1 | OUTPATIENT
Start: 2020-09-02

## 2020-09-02 RX ORDER — AMITRIPTYLINE HYDROCHLORIDE 10 MG/1
20 TABLET, FILM COATED ORAL NIGHTLY
Qty: 60 TABLET | Refills: 3 | Status: SHIPPED | OUTPATIENT
Start: 2020-09-02 | End: 2020-10-30 | Stop reason: SDUPTHER

## 2020-09-02 RX ORDER — OMEPRAZOLE 40 MG/1
40 CAPSULE, DELAYED RELEASE ORAL 2 TIMES DAILY
Qty: 180 CAPSULE | Refills: 2 | Status: SHIPPED | OUTPATIENT
Start: 2020-09-02

## 2020-09-03 ENCOUNTER — E-ADVICE (OUTPATIENT)
Dept: ALLERGY | Age: 65
End: 2020-09-03

## 2020-09-04 ENCOUNTER — TELEPHONE (OUTPATIENT)
Dept: INTERNAL MEDICINE | Age: 65
End: 2020-09-04

## 2020-09-04 RX ORDER — PREDNISONE 20 MG/1
40 TABLET ORAL DAILY
Qty: 14 TABLET | Refills: 0 | Status: SHIPPED | OUTPATIENT
Start: 2020-09-04

## 2020-09-06 ENCOUNTER — WALK IN (OUTPATIENT)
Dept: URGENT CARE | Age: 65
End: 2020-09-06

## 2020-09-06 VITALS
RESPIRATION RATE: 20 BRPM | OXYGEN SATURATION: 96 % | HEART RATE: 80 BPM | TEMPERATURE: 98.4 F | SYSTOLIC BLOOD PRESSURE: 129 MMHG | DIASTOLIC BLOOD PRESSURE: 86 MMHG

## 2020-09-06 DIAGNOSIS — J06.9 UPPER RESPIRATORY TRACT INFECTION, UNSPECIFIED TYPE: ICD-10-CM

## 2020-09-06 DIAGNOSIS — R06.02 SHORTNESS OF BREATH: ICD-10-CM

## 2020-09-06 DIAGNOSIS — Z20.822 SUSPECTED COVID-19 VIRUS INFECTION: Primary | ICD-10-CM

## 2020-09-06 PROCEDURE — U0003 INFECTIOUS AGENT DETECTION BY NUCLEIC ACID (DNA OR RNA); SEVERE ACUTE RESPIRATORY SYNDROME CORONAVIRUS 2 (SARS-COV-2) (CORONAVIRUS DISEASE [COVID-19]), AMPLIFIED PROBE TECHNIQUE, MAKING USE OF HIGH THROUGHPUT TECHNOLOGIES AS DESCRIBED BY CMS-2020-01-R: HCPCS | Performed by: CLINICAL MEDICAL LABORATORY

## 2020-09-06 PROCEDURE — 99213 OFFICE O/P EST LOW 20 MIN: CPT | Performed by: FAMILY MEDICINE

## 2020-09-06 RX ORDER — BENZONATATE 100 MG/1
100 CAPSULE ORAL 3 TIMES DAILY PRN
Qty: 40 CAPSULE | Refills: 0 | Status: SHIPPED | OUTPATIENT
Start: 2020-09-06

## 2020-09-06 RX ORDER — DOXYCYCLINE 100 MG/1
100 CAPSULE ORAL 2 TIMES DAILY
Qty: 20 CAPSULE | Refills: 0 | Status: SHIPPED | OUTPATIENT
Start: 2020-09-06 | End: 2020-09-16

## 2020-09-08 RX ORDER — CODEINE PHOSPHATE AND GUAIFENESIN 10; 100 MG/5ML; MG/5ML
5 SOLUTION ORAL 3 TIMES DAILY PRN
Qty: 240 ML | Refills: 0 | Status: SHIPPED | OUTPATIENT
Start: 2020-09-08 | End: 2020-09-28 | Stop reason: SDUPTHER

## 2020-09-09 ENCOUNTER — E-ADVICE (OUTPATIENT)
Dept: INTERNAL MEDICINE | Age: 65
End: 2020-09-09

## 2020-09-09 ENCOUNTER — TELEPHONE (OUTPATIENT)
Dept: URGENT CARE | Age: 65
End: 2020-09-09

## 2020-09-09 LAB — SARS-COV-2 RNA RESP QL NAA+PROBE: NOT DETECTED

## 2020-09-09 RX ORDER — FLUCONAZOLE 100 MG/1
100 TABLET ORAL DAILY
Qty: 7 TABLET | Refills: 0 | Status: SHIPPED | OUTPATIENT
Start: 2020-09-09

## 2020-09-15 ENCOUNTER — OFFICE VISIT (OUTPATIENT)
Dept: PULMONOLOGY | Age: 65
End: 2020-09-15

## 2020-09-15 VITALS
BODY MASS INDEX: 44.26 KG/M2 | SYSTOLIC BLOOD PRESSURE: 130 MMHG | DIASTOLIC BLOOD PRESSURE: 80 MMHG | HEART RATE: 80 BPM | WEIGHT: 266 LBS | OXYGEN SATURATION: 96 %

## 2020-09-15 DIAGNOSIS — J98.01 ACUTE BRONCHOSPASM: ICD-10-CM

## 2020-09-15 DIAGNOSIS — J32.4 CHRONIC PANSINUSITIS: ICD-10-CM

## 2020-09-15 DIAGNOSIS — J45.51 SEVERE PERSISTENT ASTHMA WITH EXACERBATION: Primary | ICD-10-CM

## 2020-09-15 PROCEDURE — 99213 OFFICE O/P EST LOW 20 MIN: CPT | Performed by: INTERNAL MEDICINE

## 2020-09-17 ENCOUNTER — E-ADVICE (OUTPATIENT)
Dept: ALLERGY | Age: 65
End: 2020-09-17

## 2020-09-20 ENCOUNTER — E-ADVICE (OUTPATIENT)
Dept: INTERNAL MEDICINE | Age: 65
End: 2020-09-20

## 2020-09-22 ENCOUNTER — CASE MANAGEMENT (OUTPATIENT)
Dept: ALLERGY | Age: 65
End: 2020-09-22

## 2020-09-22 RX ORDER — METHYLPREDNISOLONE SODIUM SUCCINATE 125 MG/2ML
125 INJECTION, POWDER, LYOPHILIZED, FOR SOLUTION INTRAMUSCULAR; INTRAVENOUS
Status: CANCELLED | OUTPATIENT
Start: 2020-09-23

## 2020-09-22 RX ORDER — ACETAMINOPHEN 325 MG/1
650 TABLET ORAL ONCE
Status: CANCELLED | OUTPATIENT
Start: 2020-09-23

## 2020-09-22 RX ORDER — PREDNISONE 20 MG/1
20 TABLET ORAL ONCE
Status: CANCELLED
Start: 2020-09-23

## 2020-09-22 RX ORDER — ALBUTEROL SULFATE 90 UG/1
2 AEROSOL, METERED RESPIRATORY (INHALATION)
Status: CANCELLED | OUTPATIENT
Start: 2020-09-23

## 2020-09-22 RX ORDER — IMMUNE GLOBULIN INFUSION (HUMAN) 100 MG/ML
25 INJECTION, SOLUTION INTRAVENOUS; SUBCUTANEOUS ONCE
Status: CANCELLED | OUTPATIENT
Start: 2020-09-23

## 2020-09-22 RX ORDER — FAMOTIDINE 10 MG/ML
20 INJECTION, SOLUTION INTRAVENOUS
Status: CANCELLED | OUTPATIENT
Start: 2020-09-23

## 2020-09-22 RX ORDER — DIPHENHYDRAMINE HCL 25 MG
25 CAPSULE ORAL ONCE
Status: CANCELLED | OUTPATIENT
Start: 2020-09-23

## 2020-09-22 RX ORDER — SODIUM CHLORIDE 9 MG/ML
INJECTION, SOLUTION INTRAVENOUS CONTINUOUS PRN
Status: CANCELLED | OUTPATIENT
Start: 2020-09-23

## 2020-09-22 RX ORDER — DIPHENHYDRAMINE HYDROCHLORIDE 50 MG/ML
50 INJECTION INTRAMUSCULAR; INTRAVENOUS
Status: CANCELLED | OUTPATIENT
Start: 2020-09-23

## 2020-09-22 RX ORDER — 0.9 % SODIUM CHLORIDE 0.9 %
2 VIAL (ML) INJECTION PRN
Status: CANCELLED | OUTPATIENT
Start: 2020-09-23

## 2020-09-23 ENCOUNTER — TELEPHONE (OUTPATIENT)
Dept: ALLERGY | Age: 65
End: 2020-09-23

## 2020-09-24 ENCOUNTER — OFFICE VISIT (OUTPATIENT)
Dept: HEMATOLOGY/ONCOLOGY | Age: 65
End: 2020-09-24
Attending: INTERNAL MEDICINE

## 2020-09-24 ENCOUNTER — LAB SERVICES (OUTPATIENT)
Dept: LAB | Age: 65
End: 2020-09-24
Attending: INTERNAL MEDICINE

## 2020-09-24 VITALS
DIASTOLIC BLOOD PRESSURE: 67 MMHG | SYSTOLIC BLOOD PRESSURE: 136 MMHG | RESPIRATION RATE: 18 BRPM | HEART RATE: 81 BPM | TEMPERATURE: 97.9 F | OXYGEN SATURATION: 96 %

## 2020-09-24 DIAGNOSIS — D80.1 HYPOGAMMAGLOBULINEMIA (CMD): ICD-10-CM

## 2020-09-24 DIAGNOSIS — D80.1 HYPOGAMMAGLOBULINEMIA (CMD): Primary | ICD-10-CM

## 2020-09-24 DIAGNOSIS — D83.9 COMMON VARIABLE IMMUNODEFICIENCY (CMD): ICD-10-CM

## 2020-09-24 PROCEDURE — 10002800 HB RX 250 W HCPCS: Performed by: ALLERGY & IMMUNOLOGY

## 2020-09-24 PROCEDURE — 82784 ASSAY IGA/IGD/IGG/IGM EACH: CPT

## 2020-09-24 PROCEDURE — 36592 COLLECT BLOOD FROM PICC: CPT

## 2020-09-24 PROCEDURE — A4212 NON CORING NEEDLE OR STYLET: HCPCS

## 2020-09-24 PROCEDURE — 10004742 HB COUNTER VISIT MASSAGE: Performed by: MASSAGE THERAPIST

## 2020-09-24 PROCEDURE — 96366 THER/PROPH/DIAG IV INF ADDON: CPT

## 2020-09-24 PROCEDURE — 96365 THER/PROPH/DIAG IV INF INIT: CPT

## 2020-09-24 RX ORDER — 0.9 % SODIUM CHLORIDE 0.9 %
2 VIAL (ML) INJECTION PRN
Status: DISCONTINUED | OUTPATIENT
Start: 2020-09-24 | End: 2020-09-24 | Stop reason: HOSPADM

## 2020-09-24 RX ORDER — HEPARIN 100 UNIT/ML
5 SYRINGE INTRAVENOUS PRN
Status: CANCELLED | OUTPATIENT
Start: 2020-09-24

## 2020-09-24 RX ORDER — ACETAMINOPHEN 325 MG/1
650 TABLET ORAL ONCE
Status: CANCELLED | OUTPATIENT
Start: 2020-09-24

## 2020-09-24 RX ORDER — HEPARIN SODIUM,PORCINE/PF 10 UNIT/ML
5 SYRINGE (ML) INTRAVENOUS ONCE
Status: CANCELLED | OUTPATIENT
Start: 2020-09-24

## 2020-09-24 RX ORDER — IMMUNE GLOBULIN INFUSION (HUMAN) 100 MG/ML
25 INJECTION, SOLUTION INTRAVENOUS; SUBCUTANEOUS ONCE
Status: CANCELLED | OUTPATIENT
Start: 2020-09-24

## 2020-09-24 RX ORDER — FAMOTIDINE 10 MG/ML
20 INJECTION, SOLUTION INTRAVENOUS
Status: CANCELLED | OUTPATIENT
Start: 2020-09-24

## 2020-09-24 RX ORDER — DIPHENHYDRAMINE HCL 25 MG
25 CAPSULE ORAL ONCE
Status: CANCELLED | OUTPATIENT
Start: 2020-09-24

## 2020-09-24 RX ORDER — PREDNISONE 20 MG/1
20 TABLET ORAL ONCE
Status: CANCELLED
Start: 2020-09-24

## 2020-09-24 RX ORDER — ALBUTEROL SULFATE 90 UG/1
2 AEROSOL, METERED RESPIRATORY (INHALATION)
Status: CANCELLED | OUTPATIENT
Start: 2020-09-24

## 2020-09-24 RX ORDER — HEPARIN SODIUM,PORCINE/PF 10 UNIT/ML
5 SYRINGE (ML) INTRAVENOUS PRN
Status: CANCELLED | OUTPATIENT
Start: 2020-09-24

## 2020-09-24 RX ORDER — LEVOTHYROXINE SODIUM 0.05 MG/1
50 TABLET ORAL DAILY
Qty: 90 TABLET | Refills: 3 | Status: SHIPPED | OUTPATIENT
Start: 2020-09-24

## 2020-09-24 RX ORDER — IMMUNE GLOBULIN INFUSION (HUMAN) 100 MG/ML
25 INJECTION, SOLUTION INTRAVENOUS; SUBCUTANEOUS ONCE
Status: COMPLETED | OUTPATIENT
Start: 2020-09-24 | End: 2020-09-24

## 2020-09-24 RX ORDER — SODIUM CHLORIDE 9 MG/ML
INJECTION, SOLUTION INTRAVENOUS CONTINUOUS PRN
Status: CANCELLED | OUTPATIENT
Start: 2020-09-24

## 2020-09-24 RX ORDER — DIPHENHYDRAMINE HYDROCHLORIDE 50 MG/ML
50 INJECTION INTRAMUSCULAR; INTRAVENOUS
Status: CANCELLED | OUTPATIENT
Start: 2020-09-24

## 2020-09-24 RX ORDER — 0.9 % SODIUM CHLORIDE 0.9 %
2 VIAL (ML) INJECTION PRN
Status: CANCELLED | OUTPATIENT
Start: 2020-09-24

## 2020-09-24 RX ORDER — HEPARIN 100 UNIT/ML
5 SYRINGE INTRAVENOUS PRN
Status: DISCONTINUED | OUTPATIENT
Start: 2020-09-24 | End: 2020-09-24 | Stop reason: HOSPADM

## 2020-09-24 RX ORDER — METHYLPREDNISOLONE SODIUM SUCCINATE 125 MG/2ML
125 INJECTION, POWDER, LYOPHILIZED, FOR SOLUTION INTRAMUSCULAR; INTRAVENOUS
Status: CANCELLED | OUTPATIENT
Start: 2020-09-24

## 2020-09-24 RX ADMIN — IMMUNE GLOBULIN INFUSION (HUMAN) 25 G: 100 INJECTION, SOLUTION INTRAVENOUS; SUBCUTANEOUS at 09:02

## 2020-09-24 RX ADMIN — Medication 500 UNITS: at 14:15

## 2020-09-25 ENCOUNTER — E-ADVICE (OUTPATIENT)
Dept: INTERNAL MEDICINE | Age: 65
End: 2020-09-25

## 2020-09-25 LAB
IGA SERPL-MCNC: 140 MG/DL (ref 82–453)
IGG SERPL-MCNC: 644 MG/DL (ref 751–1560)
IGM SERPL-MCNC: 27 MG/DL (ref 46–304)

## 2020-09-28 ENCOUNTER — TELEPHONE (OUTPATIENT)
Dept: PULMONOLOGY | Age: 65
End: 2020-09-28

## 2020-09-28 RX ORDER — ONDANSETRON 4 MG/1
4 TABLET, FILM COATED ORAL EVERY 8 HOURS PRN
Qty: 20 TABLET | Refills: 2 | Status: SHIPPED | OUTPATIENT
Start: 2020-09-28

## 2020-09-28 RX ORDER — CODEINE PHOSPHATE AND GUAIFENESIN 10; 100 MG/5ML; MG/5ML
5 SOLUTION ORAL 3 TIMES DAILY PRN
Qty: 240 ML | Refills: 0 | Status: SHIPPED | OUTPATIENT
Start: 2020-09-28 | End: 2020-11-02 | Stop reason: SDUPTHER

## 2020-10-02 ENCOUNTER — NURSE ONLY (OUTPATIENT)
Dept: INTERNAL MEDICINE | Age: 65
End: 2020-10-02

## 2020-10-02 DIAGNOSIS — Z23 NEED FOR VACCINATION: ICD-10-CM

## 2020-10-02 PROCEDURE — G0008 ADMIN INFLUENZA VIRUS VAC: HCPCS | Performed by: INTERNAL MEDICINE

## 2020-10-02 PROCEDURE — 90750 HZV VACC RECOMBINANT IM: CPT | Performed by: INTERNAL MEDICINE

## 2020-10-02 PROCEDURE — 90471 IMMUNIZATION ADMIN: CPT | Performed by: INTERNAL MEDICINE

## 2020-10-02 PROCEDURE — 90686 IIV4 VACC NO PRSV 0.5 ML IM: CPT | Performed by: INTERNAL MEDICINE

## 2020-10-19 ENCOUNTER — E-ADVICE (OUTPATIENT)
Dept: INTERNAL MEDICINE | Age: 65
End: 2020-10-19

## 2020-10-22 ENCOUNTER — OFFICE VISIT (OUTPATIENT)
Dept: HEMATOLOGY/ONCOLOGY | Age: 65
End: 2020-10-22
Attending: INTERNAL MEDICINE

## 2020-10-22 DIAGNOSIS — D80.1 HYPOGAMMAGLOBULINEMIA (CMD): ICD-10-CM

## 2020-10-22 DIAGNOSIS — D83.9 COMMON VARIABLE IMMUNODEFICIENCY (CMD): Primary | ICD-10-CM

## 2020-10-22 PROCEDURE — 10004742 HB COUNTER VISIT MASSAGE: Performed by: MASSAGE THERAPIST

## 2020-10-22 PROCEDURE — 96365 THER/PROPH/DIAG IV INF INIT: CPT

## 2020-10-22 PROCEDURE — 10002800 HB RX 250 W HCPCS: Performed by: ALLERGY & IMMUNOLOGY

## 2020-10-22 PROCEDURE — 96366 THER/PROPH/DIAG IV INF ADDON: CPT

## 2020-10-22 PROCEDURE — A4212 NON CORING NEEDLE OR STYLET: HCPCS

## 2020-10-22 RX ORDER — DIPHENHYDRAMINE HCL 25 MG
25 CAPSULE ORAL ONCE
Status: CANCELLED | OUTPATIENT
Start: 2020-11-10

## 2020-10-22 RX ORDER — HEPARIN 100 UNIT/ML
5 SYRINGE INTRAVENOUS PRN
Status: DISCONTINUED | OUTPATIENT
Start: 2020-10-22 | End: 2020-10-22 | Stop reason: HOSPADM

## 2020-10-22 RX ORDER — HEPARIN SODIUM,PORCINE/PF 10 UNIT/ML
5 SYRINGE (ML) INTRAVENOUS PRN
Status: CANCELLED | OUTPATIENT
Start: 2020-10-22

## 2020-10-22 RX ORDER — ACETAMINOPHEN 325 MG/1
650 TABLET ORAL ONCE
Status: CANCELLED | OUTPATIENT
Start: 2020-11-10

## 2020-10-22 RX ORDER — DIPHENHYDRAMINE HYDROCHLORIDE 50 MG/ML
50 INJECTION INTRAMUSCULAR; INTRAVENOUS
Status: CANCELLED | OUTPATIENT
Start: 2020-11-10

## 2020-10-22 RX ORDER — ACETAMINOPHEN 325 MG/1
650 TABLET ORAL ONCE
Status: DISCONTINUED | OUTPATIENT
Start: 2020-10-22 | End: 2020-10-22 | Stop reason: HOSPADM

## 2020-10-22 RX ORDER — IMMUNE GLOBULIN INFUSION (HUMAN) 100 MG/ML
25 INJECTION, SOLUTION INTRAVENOUS; SUBCUTANEOUS ONCE
Status: CANCELLED | OUTPATIENT
Start: 2020-11-10

## 2020-10-22 RX ORDER — IMMUNE GLOBULIN INFUSION (HUMAN) 100 MG/ML
25 INJECTION, SOLUTION INTRAVENOUS; SUBCUTANEOUS ONCE
Status: COMPLETED | OUTPATIENT
Start: 2020-10-22 | End: 2020-10-22

## 2020-10-22 RX ORDER — FAMOTIDINE 10 MG/ML
20 INJECTION, SOLUTION INTRAVENOUS
Status: CANCELLED | OUTPATIENT
Start: 2020-11-10

## 2020-10-22 RX ORDER — HEPARIN 100 UNIT/ML
5 SYRINGE INTRAVENOUS PRN
Status: CANCELLED | OUTPATIENT
Start: 2020-10-22

## 2020-10-22 RX ORDER — DIPHENHYDRAMINE HCL 25 MG
25 CAPSULE ORAL ONCE
Status: DISCONTINUED | OUTPATIENT
Start: 2020-10-22 | End: 2020-10-22 | Stop reason: HOSPADM

## 2020-10-22 RX ORDER — SODIUM CHLORIDE 9 MG/ML
INJECTION, SOLUTION INTRAVENOUS CONTINUOUS PRN
Status: CANCELLED | OUTPATIENT
Start: 2020-11-10

## 2020-10-22 RX ORDER — 0.9 % SODIUM CHLORIDE 0.9 %
2 VIAL (ML) INJECTION PRN
Status: DISCONTINUED | OUTPATIENT
Start: 2020-10-22 | End: 2020-10-22 | Stop reason: HOSPADM

## 2020-10-22 RX ORDER — HEPARIN SODIUM,PORCINE/PF 10 UNIT/ML
5 SYRINGE (ML) INTRAVENOUS ONCE
Status: CANCELLED | OUTPATIENT
Start: 2020-10-22

## 2020-10-22 RX ORDER — 0.9 % SODIUM CHLORIDE 0.9 %
2 VIAL (ML) INJECTION PRN
Status: CANCELLED | OUTPATIENT
Start: 2020-11-10

## 2020-10-22 RX ORDER — METHYLPREDNISOLONE SODIUM SUCCINATE 125 MG/2ML
125 INJECTION, POWDER, LYOPHILIZED, FOR SOLUTION INTRAMUSCULAR; INTRAVENOUS
Status: CANCELLED | OUTPATIENT
Start: 2020-11-10

## 2020-10-22 RX ORDER — PREDNISONE 20 MG/1
20 TABLET ORAL ONCE
Status: CANCELLED
Start: 2020-11-10

## 2020-10-22 RX ORDER — ALBUTEROL SULFATE 90 UG/1
2 AEROSOL, METERED RESPIRATORY (INHALATION)
Status: CANCELLED | OUTPATIENT
Start: 2020-11-10

## 2020-10-22 RX ADMIN — IMMUNE GLOBULIN INFUSION (HUMAN) 25 G: 100 INJECTION, SOLUTION INTRAVENOUS; SUBCUTANEOUS at 08:53

## 2020-10-22 RX ADMIN — HEPARIN SODIUM (PORCINE) LOCK FLUSH IV SOLN 100 UNIT/ML 500 UNITS: 100 SOLUTION at 14:19

## 2020-10-22 ASSESSMENT — PAIN SCALES - GENERAL: PAINLEVEL: 0

## 2020-10-28 ENCOUNTER — OFFICE VISIT (OUTPATIENT)
Dept: CHIROPRACTIC MEDICINE | Age: 65
End: 2020-10-28

## 2020-10-28 DIAGNOSIS — M53.3 SACRAL PAIN: ICD-10-CM

## 2020-10-28 DIAGNOSIS — M54.6 PAIN IN THORACIC SPINE: ICD-10-CM

## 2020-10-28 DIAGNOSIS — M99.01 SEGMENTAL AND SOMATIC DYSFUNCTION OF CERVICAL REGION: Primary | ICD-10-CM

## 2020-10-28 DIAGNOSIS — G89.29 CHRONIC BILATERAL LOW BACK PAIN WITHOUT SCIATICA: ICD-10-CM

## 2020-10-28 DIAGNOSIS — M54.2 CERVICALGIA: ICD-10-CM

## 2020-10-28 DIAGNOSIS — M99.02 THORACIC REGION SOMATIC DYSFUNCTION: ICD-10-CM

## 2020-10-28 DIAGNOSIS — M99.05 PELVIC SOMATIC DYSFUNCTION: ICD-10-CM

## 2020-10-28 DIAGNOSIS — M99.04 SACRAL REGION SOMATIC DYSFUNCTION: ICD-10-CM

## 2020-10-28 DIAGNOSIS — M54.50 CHRONIC BILATERAL LOW BACK PAIN WITHOUT SCIATICA: ICD-10-CM

## 2020-10-28 DIAGNOSIS — M99.03 LUMBAR REGION SOMATIC DYSFUNCTION: ICD-10-CM

## 2020-10-28 PROCEDURE — 98942 CHIROPRACTIC MANJ 5 REGIONS: CPT | Performed by: CHIROPRACTOR

## 2020-10-30 ENCOUNTER — E-ADVICE (OUTPATIENT)
Dept: NEPHROLOGY | Age: 65
End: 2020-10-30

## 2020-10-30 ENCOUNTER — E-ADVICE (OUTPATIENT)
Dept: INTERNAL MEDICINE | Age: 65
End: 2020-10-30

## 2020-10-30 ENCOUNTER — E-ADVICE (OUTPATIENT)
Dept: ALLERGY | Age: 65
End: 2020-10-30

## 2020-10-30 ENCOUNTER — E-ADVICE (OUTPATIENT)
Dept: PULMONOLOGY | Age: 65
End: 2020-10-30

## 2020-10-30 DIAGNOSIS — I10 ESSENTIAL HYPERTENSION WITH GOAL BLOOD PRESSURE LESS THAN 140/90: ICD-10-CM

## 2020-10-30 RX ORDER — POTASSIUM CHLORIDE 750 MG/1
20 TABLET, EXTENDED RELEASE ORAL 2 TIMES DAILY
Qty: 360 TABLET | Refills: 0 | Status: SHIPPED | OUTPATIENT
Start: 2020-10-30

## 2020-10-30 RX ORDER — VALSARTAN 160 MG/1
160 TABLET ORAL DAILY
Qty: 90 TABLET | Refills: 0 | Status: SHIPPED | OUTPATIENT
Start: 2020-10-30

## 2020-10-30 RX ORDER — FLUTICASONE PROPIONATE 50 MCG
1 SPRAY, SUSPENSION (ML) NASAL DAILY
Qty: 48 G | Refills: 0 | Status: SHIPPED | OUTPATIENT
Start: 2020-10-30

## 2020-10-30 RX ORDER — PRAZOSIN HYDROCHLORIDE 2 MG/1
2 CAPSULE ORAL NIGHTLY
Qty: 90 CAPSULE | Refills: 0 | Status: SHIPPED | OUTPATIENT
Start: 2020-10-30

## 2020-10-30 RX ORDER — POTASSIUM CITRATE 10 MEQ/1
10 TABLET, EXTENDED RELEASE ORAL
Qty: 90 TABLET | Refills: 0 | Status: SHIPPED | OUTPATIENT
Start: 2020-10-30

## 2020-10-30 RX ORDER — FLUTICASONE PROPIONATE AND SALMETEROL 500; 50 UG/1; UG/1
1 POWDER RESPIRATORY (INHALATION)
Qty: 180 EACH | Refills: 0 | Status: SHIPPED | OUTPATIENT
Start: 2020-10-30

## 2020-10-30 RX ORDER — MONTELUKAST SODIUM 10 MG/1
10 TABLET ORAL EVERY EVENING
Qty: 90 TABLET | Refills: 0 | Status: SHIPPED | OUTPATIENT
Start: 2020-10-30

## 2020-10-30 RX ORDER — TIOTROPIUM BROMIDE INHALATION SPRAY 3.12 UG/1
2 SPRAY, METERED RESPIRATORY (INHALATION) DAILY
Qty: 3 INHALER | Refills: 0 | Status: SHIPPED | OUTPATIENT
Start: 2020-10-30

## 2020-10-30 RX ORDER — FUROSEMIDE 40 MG/1
TABLET ORAL
Qty: 360 TABLET | Refills: 0 | Status: SHIPPED | OUTPATIENT
Start: 2020-10-30

## 2020-11-02 RX ORDER — CODEINE PHOSPHATE AND GUAIFENESIN 10; 100 MG/5ML; MG/5ML
5 SOLUTION ORAL 3 TIMES DAILY PRN
Qty: 240 ML | Refills: 0 | Status: SHIPPED | OUTPATIENT
Start: 2020-11-02

## 2020-11-09 ENCOUNTER — E-ADVICE (OUTPATIENT)
Dept: INTERNAL MEDICINE | Age: 65
End: 2020-11-09

## 2020-11-09 ENCOUNTER — OFFICE VISIT (OUTPATIENT)
Dept: CHIROPRACTIC MEDICINE | Age: 65
End: 2020-11-09

## 2020-11-09 DIAGNOSIS — M54.6 PAIN IN THORACIC SPINE: ICD-10-CM

## 2020-11-09 DIAGNOSIS — M54.50 CHRONIC BILATERAL LOW BACK PAIN WITHOUT SCIATICA: ICD-10-CM

## 2020-11-09 DIAGNOSIS — M54.2 CERVICALGIA: ICD-10-CM

## 2020-11-09 DIAGNOSIS — M53.3 SACRAL PAIN: ICD-10-CM

## 2020-11-09 DIAGNOSIS — M99.04 SACRAL REGION SOMATIC DYSFUNCTION: ICD-10-CM

## 2020-11-09 DIAGNOSIS — M99.03 LUMBAR REGION SOMATIC DYSFUNCTION: Primary | ICD-10-CM

## 2020-11-09 DIAGNOSIS — M99.02 THORACIC REGION SOMATIC DYSFUNCTION: ICD-10-CM

## 2020-11-09 DIAGNOSIS — G89.29 CHRONIC BILATERAL LOW BACK PAIN WITHOUT SCIATICA: ICD-10-CM

## 2020-11-09 DIAGNOSIS — M99.05 PELVIC SOMATIC DYSFUNCTION: ICD-10-CM

## 2020-11-09 DIAGNOSIS — M99.01 SEGMENTAL AND SOMATIC DYSFUNCTION OF CERVICAL REGION: ICD-10-CM

## 2020-11-09 PROCEDURE — 98942 CHIROPRACTIC MANJ 5 REGIONS: CPT | Performed by: CHIROPRACTOR

## 2020-11-11 ENCOUNTER — APPOINTMENT (OUTPATIENT)
Dept: NEPHROLOGY | Age: 65
End: 2020-11-11

## 2020-11-12 ENCOUNTER — OFFICE VISIT (OUTPATIENT)
Dept: HEMATOLOGY/ONCOLOGY | Age: 65
End: 2020-11-12
Attending: INTERNAL MEDICINE

## 2020-11-12 DIAGNOSIS — D80.1 HYPOGAMMAGLOBULINEMIA (CMD): Primary | ICD-10-CM

## 2020-11-12 DIAGNOSIS — D83.9 COMMON VARIABLE IMMUNODEFICIENCY (CMD): ICD-10-CM

## 2020-11-12 PROCEDURE — 96366 THER/PROPH/DIAG IV INF ADDON: CPT

## 2020-11-12 PROCEDURE — A4212 NON CORING NEEDLE OR STYLET: HCPCS

## 2020-11-12 PROCEDURE — 10002800 HB RX 250 W HCPCS: Performed by: ALLERGY & IMMUNOLOGY

## 2020-11-12 PROCEDURE — 96365 THER/PROPH/DIAG IV INF INIT: CPT

## 2020-11-12 RX ORDER — IMMUNE GLOBULIN INFUSION (HUMAN) 100 MG/ML
25 INJECTION, SOLUTION INTRAVENOUS; SUBCUTANEOUS ONCE
OUTPATIENT
Start: 2020-12-10

## 2020-11-12 RX ORDER — HEPARIN 100 UNIT/ML
5 SYRINGE INTRAVENOUS PRN
OUTPATIENT
Start: 2020-11-12

## 2020-11-12 RX ORDER — IMMUNE GLOBULIN INFUSION (HUMAN) 100 MG/ML
25 INJECTION, SOLUTION INTRAVENOUS; SUBCUTANEOUS ONCE
Status: COMPLETED | OUTPATIENT
Start: 2020-11-12 | End: 2020-11-12

## 2020-11-12 RX ORDER — METHYLPREDNISOLONE SODIUM SUCCINATE 125 MG/2ML
125 INJECTION, POWDER, LYOPHILIZED, FOR SOLUTION INTRAMUSCULAR; INTRAVENOUS
OUTPATIENT
Start: 2020-12-10

## 2020-11-12 RX ORDER — FAMOTIDINE 10 MG/ML
20 INJECTION, SOLUTION INTRAVENOUS
OUTPATIENT
Start: 2020-12-10

## 2020-11-12 RX ORDER — HEPARIN SODIUM,PORCINE/PF 10 UNIT/ML
5 SYRINGE (ML) INTRAVENOUS ONCE
OUTPATIENT
Start: 2020-11-12

## 2020-11-12 RX ORDER — HEPARIN 100 UNIT/ML
5 SYRINGE INTRAVENOUS PRN
Status: DISCONTINUED | OUTPATIENT
Start: 2020-11-12 | End: 2020-11-12 | Stop reason: HOSPADM

## 2020-11-12 RX ORDER — ACETAMINOPHEN 325 MG/1
650 TABLET ORAL ONCE
Status: DISCONTINUED | OUTPATIENT
Start: 2020-11-12 | End: 2020-11-12 | Stop reason: HOSPADM

## 2020-11-12 RX ORDER — ACETAMINOPHEN 325 MG/1
650 TABLET ORAL ONCE
OUTPATIENT
Start: 2020-12-10

## 2020-11-12 RX ORDER — DIPHENHYDRAMINE HCL 25 MG
25 CAPSULE ORAL ONCE
Status: DISCONTINUED | OUTPATIENT
Start: 2020-11-12 | End: 2020-11-12 | Stop reason: HOSPADM

## 2020-11-12 RX ORDER — 0.9 % SODIUM CHLORIDE 0.9 %
2 VIAL (ML) INJECTION PRN
OUTPATIENT
Start: 2020-12-10

## 2020-11-12 RX ORDER — HEPARIN SODIUM,PORCINE/PF 10 UNIT/ML
5 SYRINGE (ML) INTRAVENOUS PRN
OUTPATIENT
Start: 2020-11-12

## 2020-11-12 RX ORDER — DIPHENHYDRAMINE HCL 25 MG
25 CAPSULE ORAL ONCE
OUTPATIENT
Start: 2020-12-10

## 2020-11-12 RX ORDER — ISOSORBIDE DINITRATE 10 MG/1
10 TABLET ORAL 3 TIMES DAILY
Qty: 270 TABLET | Refills: 3 | Status: SHIPPED | OUTPATIENT
Start: 2020-11-12

## 2020-11-12 RX ORDER — PREDNISONE 20 MG/1
20 TABLET ORAL ONCE
Start: 2020-12-10

## 2020-11-12 RX ORDER — SODIUM CHLORIDE 9 MG/ML
INJECTION, SOLUTION INTRAVENOUS CONTINUOUS PRN
OUTPATIENT
Start: 2020-12-10

## 2020-11-12 RX ORDER — DIPHENHYDRAMINE HYDROCHLORIDE 50 MG/ML
50 INJECTION INTRAMUSCULAR; INTRAVENOUS
OUTPATIENT
Start: 2020-12-10

## 2020-11-12 RX ORDER — ALBUTEROL SULFATE 90 UG/1
2 AEROSOL, METERED RESPIRATORY (INHALATION)
OUTPATIENT
Start: 2020-12-10

## 2020-11-12 RX ADMIN — HEPARIN SODIUM (PORCINE) LOCK FLUSH IV SOLN 100 UNIT/ML 500 UNITS: 100 SOLUTION at 14:09

## 2020-11-12 RX ADMIN — IMMUNE GLOBULIN INFUSION (HUMAN) 25 G: 100 INJECTION, SOLUTION INTRAVENOUS; SUBCUTANEOUS at 08:49

## 2020-11-13 ENCOUNTER — OFFICE VISIT (OUTPATIENT)
Dept: CHIROPRACTIC MEDICINE | Age: 65
End: 2020-11-13

## 2020-11-13 DIAGNOSIS — M99.04 SACRAL REGION SOMATIC DYSFUNCTION: ICD-10-CM

## 2020-11-13 DIAGNOSIS — M53.3 SACRAL PAIN: ICD-10-CM

## 2020-11-13 DIAGNOSIS — M54.6 PAIN IN THORACIC SPINE: ICD-10-CM

## 2020-11-13 DIAGNOSIS — M99.02 THORACIC REGION SOMATIC DYSFUNCTION: ICD-10-CM

## 2020-11-13 DIAGNOSIS — M99.03 LUMBAR REGION SOMATIC DYSFUNCTION: Primary | ICD-10-CM

## 2020-11-13 DIAGNOSIS — M54.50 CHRONIC BILATERAL LOW BACK PAIN WITHOUT SCIATICA: ICD-10-CM

## 2020-11-13 DIAGNOSIS — M99.01 SEGMENTAL AND SOMATIC DYSFUNCTION OF CERVICAL REGION: ICD-10-CM

## 2020-11-13 DIAGNOSIS — M99.05 PELVIC SOMATIC DYSFUNCTION: ICD-10-CM

## 2020-11-13 DIAGNOSIS — G89.29 CHRONIC BILATERAL LOW BACK PAIN WITHOUT SCIATICA: ICD-10-CM

## 2020-11-13 PROCEDURE — 98941 CHIROPRACT MANJ 3-4 REGIONS: CPT | Performed by: CHIROPRACTOR

## 2020-11-13 RX ORDER — DICYCLOMINE HYDROCHLORIDE 10 MG/1
10 CAPSULE ORAL
Qty: 270 CAPSULE | Refills: 0 | Status: SHIPPED | OUTPATIENT
Start: 2020-11-13

## 2020-12-01 ENCOUNTER — OFFICE VISIT (OUTPATIENT)
Dept: INTERNAL MEDICINE | Facility: CLINIC | Age: 65
End: 2020-12-01

## 2020-12-01 VITALS
DIASTOLIC BLOOD PRESSURE: 92 MMHG | HEART RATE: 82 BPM | SYSTOLIC BLOOD PRESSURE: 140 MMHG | OXYGEN SATURATION: 98 % | BODY MASS INDEX: 44.72 KG/M2 | TEMPERATURE: 98.4 F | RESPIRATION RATE: 16 BRPM | HEIGHT: 65 IN | WEIGHT: 268.4 LBS

## 2020-12-01 DIAGNOSIS — E04.1 THYROID NODULE: ICD-10-CM

## 2020-12-01 DIAGNOSIS — F32.4 MAJOR DEPRESSIVE DISORDER IN PARTIAL REMISSION, UNSPECIFIED WHETHER RECURRENT (HCC): ICD-10-CM

## 2020-12-01 DIAGNOSIS — E03.9 ACQUIRED HYPOTHYROIDISM: ICD-10-CM

## 2020-12-01 DIAGNOSIS — Z98.84 GASTRIC BYPASS STATUS FOR OBESITY: ICD-10-CM

## 2020-12-01 DIAGNOSIS — G89.29 CHRONIC NONINTRACTABLE HEADACHE, UNSPECIFIED HEADACHE TYPE: ICD-10-CM

## 2020-12-01 DIAGNOSIS — R51.9 CHRONIC NONINTRACTABLE HEADACHE, UNSPECIFIED HEADACHE TYPE: ICD-10-CM

## 2020-12-01 DIAGNOSIS — H40.9 GLAUCOMA, UNSPECIFIED GLAUCOMA TYPE, UNSPECIFIED LATERALITY: ICD-10-CM

## 2020-12-01 DIAGNOSIS — D80.1 LOW GAMMAGLOBULIN LEVEL (HCC): Primary | ICD-10-CM

## 2020-12-01 DIAGNOSIS — R79.9 ABNORMAL FINDING OF BLOOD CHEMISTRY, UNSPECIFIED: ICD-10-CM

## 2020-12-01 DIAGNOSIS — I35.0 NONRHEUMATIC AORTIC VALVE STENOSIS: ICD-10-CM

## 2020-12-01 DIAGNOSIS — G47.33 OSA (OBSTRUCTIVE SLEEP APNEA): ICD-10-CM

## 2020-12-01 DIAGNOSIS — I71.40 ABDOMINAL AORTIC ANEURYSM (AAA) WITHOUT RUPTURE (HCC): ICD-10-CM

## 2020-12-01 DIAGNOSIS — J44.1 COPD EXACERBATION (HCC): ICD-10-CM

## 2020-12-01 DIAGNOSIS — N18.9 CHRONIC RENAL IMPAIRMENT, UNSPECIFIED CKD STAGE: ICD-10-CM

## 2020-12-01 DIAGNOSIS — E55.9 VITAMIN D DEFICIENCY, UNSPECIFIED: ICD-10-CM

## 2020-12-01 PROCEDURE — 99204 OFFICE O/P NEW MOD 45 MIN: CPT | Performed by: INTERNAL MEDICINE

## 2020-12-01 RX ORDER — CHOLECALCIFEROL (VITAMIN D3) 125 MCG
10 CAPSULE ORAL NIGHTLY
COMMUNITY
End: 2023-03-13

## 2020-12-01 RX ORDER — PRAZOSIN HYDROCHLORIDE 2 MG/1
2 CAPSULE ORAL NIGHTLY
COMMUNITY
End: 2021-01-15

## 2020-12-01 RX ORDER — ACETAMINOPHEN AND CODEINE PHOSPHATE 300; 30 MG/1; MG/1
2 TABLET ORAL EVERY 6 HOURS PRN
COMMUNITY
End: 2020-12-15

## 2020-12-01 RX ORDER — LEVOTHYROXINE SODIUM 0.05 MG/1
50 TABLET ORAL DAILY
COMMUNITY
End: 2021-06-16 | Stop reason: SDUPTHER

## 2020-12-01 RX ORDER — FUROSEMIDE 40 MG/1
40 TABLET ORAL 2 TIMES DAILY
COMMUNITY
End: 2021-04-07 | Stop reason: SDUPTHER

## 2020-12-01 RX ORDER — PNV NO.95/FERROUS FUM/FOLIC AC 28MG-0.8MG
2 TABLET ORAL
COMMUNITY
End: 2021-11-10 | Stop reason: SDUPTHER

## 2020-12-01 RX ORDER — BUTALBITAL, ACETAMINOPHEN AND CAFFEINE 50; 325; 40 MG/1; MG/1; MG/1
1 TABLET ORAL EVERY 4 HOURS PRN
COMMUNITY
End: 2020-12-08

## 2020-12-01 RX ORDER — FEXOFENADINE HCL 180 MG/1
180 TABLET ORAL DAILY
COMMUNITY

## 2020-12-01 RX ORDER — FLUTICASONE PROPIONATE AND SALMETEROL XINAFOATE 230; 21 UG/1; UG/1
2 AEROSOL, METERED RESPIRATORY (INHALATION)
Qty: 12 G | Refills: 11 | Status: SHIPPED | OUTPATIENT
Start: 2020-12-01 | End: 2020-12-08 | Stop reason: SDUPTHER

## 2020-12-01 RX ORDER — GABAPENTIN 100 MG/1
100 CAPSULE ORAL DAILY
COMMUNITY
End: 2021-04-26

## 2020-12-01 RX ORDER — FAMOTIDINE 20 MG
500 TABLET ORAL 3 TIMES DAILY
COMMUNITY

## 2020-12-01 RX ORDER — DICYCLOMINE HYDROCHLORIDE 10 MG/1
10 CAPSULE ORAL DAILY
COMMUNITY
End: 2021-02-12 | Stop reason: SDUPTHER

## 2020-12-01 RX ORDER — FAMOTIDINE 20 MG/1
20 TABLET, FILM COATED ORAL 2 TIMES DAILY
COMMUNITY
End: 2022-08-10

## 2020-12-01 RX ORDER — TIZANIDINE HYDROCHLORIDE 2 MG/1
2 CAPSULE, GELATIN COATED ORAL NIGHTLY
COMMUNITY
End: 2020-12-31 | Stop reason: SDUPTHER

## 2020-12-01 RX ORDER — GUAIFENESIN/DEXTROMETHORPHAN 600MG-30MG
TABLET, EXTENDED RELEASE 12 HR ORAL
COMMUNITY
End: 2022-04-24

## 2020-12-01 RX ORDER — GABAPENTIN 300 MG/1
300 CAPSULE ORAL NIGHTLY
COMMUNITY
End: 2020-12-15

## 2020-12-01 RX ORDER — CLONAZEPAM 0.5 MG/1
0.5 TABLET ORAL NIGHTLY PRN
COMMUNITY
End: 2020-12-31 | Stop reason: SDUPTHER

## 2020-12-01 RX ORDER — OMEPRAZOLE 40 MG/1
40 CAPSULE, DELAYED RELEASE ORAL 2 TIMES DAILY
COMMUNITY
End: 2021-02-23 | Stop reason: SDUPTHER

## 2020-12-01 RX ORDER — VALSARTAN 160 MG/1
160 TABLET ORAL DAILY
COMMUNITY
End: 2021-01-15 | Stop reason: SDUPTHER

## 2020-12-01 RX ORDER — MELATONIN
1000 DAILY
COMMUNITY

## 2020-12-01 RX ORDER — MULTIPLE VITAMINS W/ MINERALS TAB 9MG-400MCG
1 TAB ORAL DAILY
COMMUNITY

## 2020-12-01 RX ORDER — POTASSIUM CITRATE 10 MEQ/1
10 TABLET, EXTENDED RELEASE ORAL DAILY
COMMUNITY
End: 2021-02-23 | Stop reason: SDUPTHER

## 2020-12-01 RX ORDER — HYDROXYZINE PAMOATE 50 MG/1
50 CAPSULE ORAL NIGHTLY
COMMUNITY
End: 2021-04-27

## 2020-12-01 RX ORDER — LANOLIN ALCOHOL/MO/W.PET/CERES
1000 CREAM (GRAM) TOPICAL DAILY
COMMUNITY

## 2020-12-01 RX ORDER — AMITRIPTYLINE HYDROCHLORIDE 10 MG/1
10 TABLET, FILM COATED ORAL 2 TIMES DAILY
COMMUNITY
End: 2021-05-12

## 2020-12-01 RX ORDER — GUAIFENESIN AND CODEINE PHOSPHATE 100; 10 MG/5ML; MG/5ML
5 SOLUTION ORAL 3 TIMES DAILY PRN
COMMUNITY
End: 2021-06-30

## 2020-12-01 RX ORDER — PREDNISONE 20 MG/1
20 TABLET ORAL 2 TIMES DAILY
COMMUNITY
End: 2021-01-15

## 2020-12-01 RX ORDER — BUPROPION HYDROCHLORIDE 100 MG/1
100 TABLET, EXTENDED RELEASE ORAL 2 TIMES DAILY
COMMUNITY
End: 2020-12-10 | Stop reason: SDUPTHER

## 2020-12-01 RX ORDER — ONDANSETRON 4 MG/1
4 TABLET, FILM COATED ORAL EVERY 8 HOURS PRN
COMMUNITY
End: 2021-05-24 | Stop reason: SDUPTHER

## 2020-12-01 RX ORDER — ISOSORBIDE DINITRATE 10 MG/1
10 TABLET ORAL 3 TIMES DAILY
COMMUNITY
End: 2021-02-25 | Stop reason: SDUPTHER

## 2020-12-01 RX ORDER — POTASSIUM CHLORIDE 750 MG/1
10 TABLET, FILM COATED, EXTENDED RELEASE ORAL 2 TIMES DAILY
COMMUNITY
End: 2021-02-12 | Stop reason: SDUPTHER

## 2020-12-01 RX ORDER — MONTELUKAST SODIUM 10 MG/1
10 TABLET ORAL NIGHTLY
COMMUNITY
End: 2021-11-10 | Stop reason: SDUPTHER

## 2020-12-01 RX ORDER — OXYCODONE HYDROCHLORIDE 5 MG/1
5 TABLET ORAL EVERY 4 HOURS PRN
COMMUNITY
End: 2020-12-01

## 2020-12-01 RX ORDER — MULTIVITAMIN WITH IRON
500 TABLET ORAL 2 TIMES DAILY
COMMUNITY

## 2020-12-01 NOTE — PROGRESS NOTES
Subjective     Patient ID: Raquel Mariee is a 65 y.o. female. Patient is here for management of multiple medical problems.     Chief Complaint   Patient presents with   • Hypertension   • COPD   • Acute Renal Failure     History of Present Illness       htn          Copd      Acute rf    Ha top of head. Takes Fioricet at onset of HA.  HA 4 x a month. For 10 years.   No increase in frequency or intensity. Decrease with wt loss on gastrec bypass.  Takes zanaflex at night.    HA start durring the day.    No nap.    Goes to bed with gabapentin 300mg at night. Zanaflex, clonazepam, cbd oil,   Melatonin 5mg at night for sleep.  Only takes attarax for anxiety attacts.      Chronic pain. Dx with fibromyalgia.   Not taking oxycodone any more.  Takes t#3 2 x a week prn HA and knee and leg pain.    Take gabapentin 100mg am and noon and 300mg at night.    Prazosin 2 mg started by np.   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 Has to have infusion with gammaglobulinopathy.            The following portions of the patient's history were reviewed and updated as appropriate: allergies, current medications, past family history, past medical history, past social history, past surgical history and problem list.    Review of Systems   Constitutional: Positive for fatigue. Negative for fever.   Respiratory: Positive for shortness of breath. Negative for cough.    Musculoskeletal: Positive for arthralgias. Negative for back pain and gait problem.   Psychiatric/Behavioral: Positive for sleep disturbance.   All other systems reviewed and are negative.      Current Outpatient Medications:   •  acetaminophen-codeine (TYLENOL #3) 300-30 MG per tablet, Take 2 tablets by mouth Every 6 (Six) Hours As Needed for Moderate Pain ., Disp: , Rfl:   •  amitriptyline (ELAVIL) 10 MG tablet, Take 10 mg by mouth 2 (Two) Times a Day., Disp: , Rfl:   •  buPROPion SR (WELLBUTRIN SR) 100 MG 12 hr tablet, Take 100 mg by mouth 2  (Two) Times a Day., Disp: , Rfl:   •  butalbital-acetaminophen-caffeine (FIORICET, ESGIC) -40 MG per tablet, Take 1 tablet by mouth Every 4 (Four) Hours As Needed for Headache., Disp: , Rfl:   •  calcium carbonate (Antacid Maximum) 1000 MG chewable tablet, Chew 1,000 mg 3 (Three) Times a Day., Disp: , Rfl:   •  CBD (cannabidiol) oral oil, Take  by mouth 2 (Two) Times a Day., Disp: , Rfl:   •  cholecalciferol (VITAMIN D3) 25 MCG (1000 UT) tablet, Take 1,000 Units by mouth Daily., Disp: , Rfl:   •  clonazePAM (KlonoPIN) 0.5 MG tablet, Take 0.5 mg by mouth At Night As Needed., Disp: , Rfl:   •  Dextromethorphan-guaiFENesin (Mucus-DM)  MG tablet sustained-release 12 hour, Take  by mouth., Disp: , Rfl:   •  dicyclomine (BENTYL) 10 MG capsule, Take 10 mg by mouth 3 (Three) Times a Day Before Meals., Disp: , Rfl:   •  famotidine (PEPCID) 20 MG tablet, Take 20 mg by mouth 2 (Two) Times a Day., Disp: , Rfl:   •  ferrous sulfate 325 (65 Fe) MG tablet, Take  by mouth., Disp: , Rfl:   •  fexofenadine (ALLEGRA) 180 MG tablet, Take 180 mg by mouth Daily., Disp: , Rfl:   •  fluticasone (FLOVENT DISKUS) 50 MCG/BLIST diskus inhaler, Inhale 1 puff 2 (Two) Times a Day., Disp: , Rfl:   •  furosemide (LASIX) 40 MG tablet, Take 40 mg by mouth 2 (Two) Times a Day., Disp: , Rfl:   •  gabapentin (NEURONTIN) 100 MG capsule, Take 100 mg by mouth 3 (Three) Times a Day., Disp: , Rfl:   •  gabapentin (NEURONTIN) 300 MG capsule, Take 300 mg by mouth Every Night., Disp: , Rfl:   •  glucose-vitamin C ([employer] Glucose) 4-6 GM-MG chewable tablet chewable tablet, Chew., Disp: , Rfl:   •  guaiFENesin-codeine (Virtussin A/C) 100-10 MG/5ML liquid, Take 5 mL by mouth 3 (Three) Times a Day As Needed for Cough., Disp: , Rfl:   •  hydrOXYzine pamoate (VISTARIL) 50 MG capsule, Take 50 mg by mouth Every Night., Disp: , Rfl:   •  isosorbide dinitrate (ISORDIL) 10 MG tablet, Take 10 mg by mouth 3 (Three) Times a Day., Disp: , Rfl:   •  levothyroxine  "(SYNTHROID, LEVOTHROID) 50 MCG tablet, Take 50 mcg by mouth Daily., Disp: , Rfl:   •  Magnesium 250 MG tablet, Take  by mouth., Disp: , Rfl:   •  melatonin 5 MG tablet tablet, Take 5 mg by mouth Every Night., Disp: , Rfl:   •  mepolizumab (NUCALA) 100 MG reconstituted solution injection, Inject 100 mg under the skin into the appropriate area as directed Every 30 (Thirty) Days., Disp: , Rfl:   •  montelukast (SINGULAIR) 10 MG tablet, Take 10 mg by mouth Every Night., Disp: , Rfl:   •  multivitamin with minerals (ONE-A-DAY PROACTIVE 65+ PO), Take 1 tablet by mouth Daily., Disp: , Rfl:   •  omeprazole (priLOSEC) 40 MG capsule, Take 40 mg by mouth 2 (two) times a day., Disp: , Rfl:   •  ondansetron (ZOFRAN) 4 MG tablet, Take 4 mg by mouth Every 8 (Eight) Hours As Needed for Nausea or Vomiting., Disp: , Rfl:   •  potassium chloride 10 MEQ CR tablet, Take 10 mEq by mouth 2 (Two) Times a Day., Disp: , Rfl:   •  potassium citrate (UROCIT-K) 10 MEQ (1080 MG) CR tablet, Take 10 mEq by mouth Daily., Disp: , Rfl:   •  prazosin (MINIPRESS) 2 MG capsule, Take 2 mg by mouth Every Night., Disp: , Rfl:   •  predniSONE (DELTASONE) 20 MG tablet, Take 20 mg by mouth 2 (Two) Times a Day., Disp: , Rfl:   •  tiotropium (SPIRIVA) 18 MCG per inhalation capsule, Place 1 capsule into inhaler and inhale Daily., Disp: , Rfl:   •  TiZANidine (ZANAFLEX) 2 MG capsule, Take 2 mg by mouth 3 (Three) Times a Day., Disp: , Rfl:   •  valsartan (DIOVAN) 160 MG tablet, Take 160 mg by mouth Daily., Disp: , Rfl:   •  vitamin B-12 (CYANOCOBALAMIN) 1000 MCG tablet, Take 1,000 mcg by mouth 2 (two) times a day., Disp: , Rfl:   •  fluticasone-salmeterol (Advair HFA) 230-21 MCG/ACT inhaler, Inhale 2 puffs by mouth 2 (Two) Times a Day., Disp: 12 g, Rfl: 11    Objective      Blood pressure 140/92, pulse 82, temperature 98.4 °F (36.9 °C), resp. rate 16, height 165.1 cm (65\"), weight 122 kg (268 lb 6.4 oz), SpO2 98 %.    Physical Exam     General Appearance:    " Alert, cooperative, no distress, appears stated age   Head:    Normocephalic, without obvious abnormality, atraumatic   Eyes:    PERRL, conjunctiva/corneas clear, EOM's intact   Ears:    Normal TM's and external ear canals, both ears   Nose:   Nares normal, septum midline, mucosa normal, no drainage   or sinus tenderness   Throat:   Nodular thyroid area. Lips, mucosa, and tongue normal; teeth and gums normal   Neck:   Supple, symmetrical, trachea midline, no adenopathy;        thyroid:  No enlargement/tenderness/nodules; no carotid    bruit or JVD   Back:     Symmetric, no curvature, ROM normal, no CVA tenderness   Lungs:     End expiratory wheezing to auscultation bilaterally, respirations unlabored   Chest wall:    No tenderness or deformity   Heart:    Regular rate and rhythm, S1 and S2 normal, 3/6 murmur,        rub or gallop   Abdomen:     Soft, non-tender, bowel sounds active all four quadrants,     no masses, no organomegaly   Extremities:   Extremities normal, atraumatic, no cyanosis,3 edema   Pulses:   2+ and symmetric all extremities   Skin:   Skin color, texture, turgor normal, no rashes or lesions   Lymph nodes:   Cervical, supraclavicular, and axillary nodes normal   Neurologic:   CNII-XII intact. Normal strength, sensation and reflexes       throughout      Results for orders placed or performed in visit on 12/01/20   Lipid Panel    Specimen: Blood   Result Value Ref Range    Total Cholesterol 229 (H) 0 - 200 mg/dL    Triglycerides 115 0 - 150 mg/dL    HDL Cholesterol 85 (H) 40 - 60 mg/dL    VLDL Cholesterol Juan A 20 5 - 40 mg/dL    LDL Chol Calc (NIH) 124 (H) 0 - 100 mg/dL   Vitamin B12    Specimen: Blood   Result Value Ref Range    Vitamin B-12 >2000 (H) 211 - 946 pg/mL   Comprehensive Metabolic Panel    Specimen: Blood   Result Value Ref Range    Glucose 130 (H) 65 - 99 mg/dL    BUN 13 8 - 23 mg/dL    Creatinine 1.51 (H) 0.57 - 1.00 mg/dL    eGFR Non African Am 35 (L) >60 mL/min/1.73    eGFR African  Am 42 (L) >60 mL/min/1.73    BUN/Creatinine Ratio 8.6 7.0 - 25.0    Sodium 139 136 - 145 mmol/L    Potassium 4.7 3.5 - 5.2 mmol/L    Chloride 104 98 - 107 mmol/L    Total CO2 25.3 22.0 - 29.0 mmol/L    Calcium 9.4 8.6 - 10.5 mg/dL    Total Protein 7.2 6.0 - 8.5 g/dL    Albumin 4.80 3.50 - 5.20 g/dL    Globulin 2.4 gm/dL    A/G Ratio 2.0 g/dL    Total Bilirubin 0.4 0.0 - 1.2 mg/dL    Alkaline Phosphatase 73 39 - 117 U/L    AST (SGOT) 27 1 - 32 U/L    ALT (SGPT) 19 1 - 33 U/L   TSH    Specimen: Blood   Result Value Ref Range    TSH 1.310 0.270 - 4.200 uIU/mL   T4, Free    Specimen: Blood   Result Value Ref Range    Free T4 1.05 0.93 - 1.70 ng/dL   Hemoglobin A1c    Specimen: Blood   Result Value Ref Range    Hemoglobin A1C 6.80 (H) 4.80 - 5.60 %   C-reactive Protein    Specimen: Blood   Result Value Ref Range    C-Reactive Protein 0.24 0.00 - 0.50 mg/dL   [place] Spotted Fever, IgM    Specimen: Blood   Result Value Ref Range    RMSF IgM     [facility] Spotted Fever, IgG    Specimen: Blood   Result Value Ref Range    RMSF IgG     Lyme Disease, PCR    Specimen: Lumbar Puncture; Cerebrospinal Fluid   Result Value Ref Range    Lyme Disease(B.burgdorferi)PCR     Ehrlichia Antibody Panel    Specimen: Blood   Result Value Ref Range    E. chaffeensis (HME) IgG Titer      E. chaffeensis (HME) IgM Titer      HGE IgG Titer      HGE IgM Titer     Cyclic Citrul Peptide Antibody, IgG / IgA    Specimen: Blood   Result Value Ref Range    CCP Antibodies IgG/IgA     Rheumatoid Factor    Specimen: Blood   Result Value Ref Range    RA Latex Turbid <10.0 0.0 - 13.9 IU/mL   Sedimentation Rate    Specimen: Blood   Result Value Ref Range    Sed Rate 13 0 - 30 mm/hr   Uric Acid    Specimen: Blood   Result Value Ref Range    Uric Acid 5.6 2.4 - 5.7 mg/dL   Antistreptolysin O Titer    Specimen: Blood   Result Value Ref Range    ASO 90.7 0.0 - 200.0 IU/mL   Nuclear Antigen Antibody, IFA    Specimen: Blood   Result Value Ref Range    BRYAN      CK    Specimen: Blood   Result Value Ref Range    Creatine Kinase 231 (H) 20 - 180 U/L   Myoglobin, Serum    Specimen: Blood   Result Value Ref Range    Myoglobin 100.0 (H) 25.0 - 58.0 ng/mL   Vitamin B6    Specimen: Blood   Result Value Ref Range    Vitamin B6     Zinc    Specimen: Blood   Result Value Ref Range    Zinc     Magnesium    Specimen: Blood   Result Value Ref Range    Magnesium 2.3 1.6 - 2.4 mg/dL   Vitamin B1, Whole Blood    Specimen: Blood   Result Value Ref Range    Vitamin B1, Whole Blood     Niacin (Vitamin B3)    Specimen: Blood   Result Value Ref Range    Nicotinamide      Nicotinic Acid     Vitamin D 25 hydroxy    Specimen: Blood   Result Value Ref Range    25 Hydroxy, Vitamin D 47.6 30.0 - 100.0 ng/ml   Vitamin D 1,25 Dihydroxy    Specimen: Blood   Result Value Ref Range    1,25-Dihydroxy, Vitamin D     Phosphorus    Specimen: Blood   Result Value Ref Range    Phosphorus 4.0 2.5 - 4.5 mg/dL   Iron Profile    Specimen: Blood   Result Value Ref Range    TIBC 326 mcg/dL    UIBC 219 112 - 346 mcg/dL    Iron 107 37 - 145 mcg/dL    Iron Saturation 33 20 - 50 %   Protein Elec + Interp, Serum    Specimen: Blood   Result Value Ref Range    Albumin      Alpha-1-Globulin      Alpha-2-Globulin      Beta Globulin      Gamma Globulin      M-Isma      Globulin      A/G Ratio      Please note Comment     SPE Interpretation      PDF     Folate    Specimen: Blood   Result Value Ref Range    Folate >20.00 4.78 - 24.20 ng/mL   CBC & Differential    Specimen: Blood   Result Value Ref Range    WBC 5.06 3.40 - 10.80 10*3/mm3    RBC 4.09 3.77 - 5.28 10*6/mm3    Hemoglobin 13.0 12.0 - 15.9 g/dL    Hematocrit 37.8 34.0 - 46.6 %    MCV 92.4 79.0 - 97.0 fL    MCH 31.8 26.6 - 33.0 pg    MCHC 34.4 31.5 - 35.7 g/dL    RDW 13.0 12.3 - 15.4 %    Platelets 297 140 - 450 10*3/mm3    Neutrophil Rel % 62.8 42.7 - 76.0 %    Lymphocyte Rel % 26.9 19.6 - 45.3 %    Monocyte Rel % 8.1 5.0 - 12.0 %    Eosinophil Rel % 0.8 0.3 - 6.2 %     Basophil Rel % 1.0 0.0 - 1.5 %    Neutrophils Absolute 3.18 1.70 - 7.00 10*3/mm3    Lymphocytes Absolute 1.36 0.70 - 3.10 10*3/mm3    Monocytes Absolute 0.41 0.10 - 0.90 10*3/mm3    Eosinophils Absolute 0.04 0.00 - 0.40 10*3/mm3    Basophils Absolute 0.05 0.00 - 0.20 10*3/mm3    Immature Granulocyte Rel % 0.4 0.0 - 0.5 %    Immature Grans Absolute 0.02 0.00 - 0.05 10*3/mm3    nRBC 0.0 0.0 - 0.2 /100 WBC         Assessment/Plan     On Levaquin a lot in the past.  Hx of AAA that is almost ready for need to repair/    Wean off neruotin first. Then zanaflex.      Diagnoses and all orders for this visit:    1. Low gammaglobulin level (CMS/HCC) (Primary)  -     Lipid Panel  -     CBC & Differential  -     Vitamin B12  -     Comprehensive Metabolic Panel  -     TSH  -     T4, Free  -     Hemoglobin A1c  -     C-reactive Protein  -     [place] Spotted Fever, IgM  -     [place] Spotted Fever, IgG  -     Lyme Disease, PCR  -     Ehrlichia Antibody Panel  -     Cyclic Citrul Peptide Antibody, IgG / IgA  -     Rheumatoid Factor  -     Sedimentation Rate  -     Uric Acid  -     Antistreptolysin O Titer  -     Nuclear Antigen Antibody, IFA  -     CK  -     Myoglobin, Serum  -     Vitamin B6  -     Zinc  -     Magnesium  -     Vitamin B1, Whole Blood  -     Niacin (Vitamin B3)  -     Vitamin D 25 hydroxy  -     Home Sleep Study  -     Ambulatory Referral to Ophthalmology  -     Vitamin D 1,25 Dihydroxy  -     Phosphorus  -     Iron Profile  -     Protein Elec + Interp, Serum  -     Folate    2. Chronic renal impairment, unspecified CKD stage  -     Lipid Panel  -     CBC & Differential  -     Vitamin B12  -     Comprehensive Metabolic Panel  -     TSH  -     T4, Free  -     Hemoglobin A1c  -     C-reactive Protein  -     [place] Spotted Fever, IgM  -     [place] Spotted Fever, IgG  -     Lyme Disease, PCR  -     Ehrlichia Antibody Panel  -     Cyclic Citrul Peptide Antibody, IgG / IgA  -     Rheumatoid Factor  -      Sedimentation Rate  -     Uric Acid  -     Antistreptolysin O Titer  -     Nuclear Antigen Antibody, IFA  -     CK  -     Myoglobin, Serum  -     Vitamin B6  -     Zinc  -     Magnesium  -     Vitamin B1, Whole Blood  -     Niacin (Vitamin B3)  -     Vitamin D 25 hydroxy  -     Home Sleep Study  -     Ambulatory Referral to Ophthalmology  -     Vitamin D 1,25 Dihydroxy  -     Phosphorus  -     Iron Profile  -     Protein Elec + Interp, Serum  -     Folate    3. Gastric bypass status for obesity  -     Lipid Panel  -     CBC & Differential  -     Vitamin B12  -     Comprehensive Metabolic Panel  -     TSH  -     T4, Free  -     Hemoglobin A1c  -     C-reactive Protein  -     [place] Spotted Fever, IgM  -     [place] Spotted Fever, IgG  -     Lyme Disease, PCR  -     Ehrlichia Antibody Panel  -     Cyclic Citrul Peptide Antibody, IgG / IgA  -     Rheumatoid Factor  -     Sedimentation Rate  -     Uric Acid  -     Antistreptolysin O Titer  -     Nuclear Antigen Antibody, IFA  -     CK  -     Myoglobin, Serum  -     Vitamin B6  -     Zinc  -     Magnesium  -     Vitamin B1, Whole Blood  -     Niacin (Vitamin B3)  -     Vitamin D 25 hydroxy  -     Home Sleep Study  -     Ambulatory Referral to Ophthalmology  -     Vitamin D 1,25 Dihydroxy  -     Phosphorus  -     Iron Profile  -     Protein Elec + Interp, Serum  -     Folate    4. KALYN (obstructive sleep apnea)  -     Lipid Panel  -     CBC & Differential  -     Vitamin B12  -     Comprehensive Metabolic Panel  -     TSH  -     T4, Free  -     Hemoglobin A1c  -     C-reactive Protein  -     [place] Spotted Fever, IgM  -     [place] Spotted Fever, IgG  -     Lyme Disease, PCR  -     Ehrlichia Antibody Panel  -     Cyclic Citrul Peptide Antibody, IgG / IgA  -     Rheumatoid Factor  -     Sedimentation Rate  -     Uric Acid  -     Antistreptolysin O Titer  -     Nuclear Antigen Antibody, IFA  -     CK  -     Myoglobin, Serum  -     Vitamin B6  -      Zinc  -     Magnesium  -     Vitamin B1, Whole Blood  -     Niacin (Vitamin B3)  -     Vitamin D 25 hydroxy  -     Home Sleep Study  -     Ambulatory Referral to Ophthalmology  -     Vitamin D 1,25 Dihydroxy  -     Phosphorus  -     Iron Profile  -     Protein Elec + Interp, Serum  -     Folate    5. Chronic nonintractable headache, unspecified headache type  -     Lipid Panel  -     CBC & Differential  -     Vitamin B12  -     Comprehensive Metabolic Panel  -     TSH  -     T4, Free  -     Hemoglobin A1c  -     C-reactive Protein  -     [place] Spotted Fever, IgM  -     [place] Spotted Fever, IgG  -     Lyme Disease, PCR  -     Ehrlichia Antibody Panel  -     Cyclic Citrul Peptide Antibody, IgG / IgA  -     Rheumatoid Factor  -     Sedimentation Rate  -     Uric Acid  -     Antistreptolysin O Titer  -     Nuclear Antigen Antibody, IFA  -     CK  -     Myoglobin, Serum  -     Vitamin B6  -     Zinc  -     Magnesium  -     Vitamin B1, Whole Blood  -     Niacin (Vitamin B3)  -     Vitamin D 25 hydroxy  -     Home Sleep Study  -     Ambulatory Referral to Ophthalmology  -     Vitamin D 1,25 Dihydroxy  -     Phosphorus  -     Iron Profile  -     Protein Elec + Interp, Serum  -     Folate    6. Acquired hypothyroidism  -     Lipid Panel  -     CBC & Differential  -     Vitamin B12  -     Comprehensive Metabolic Panel  -     TSH  -     T4, Free  -     Hemoglobin A1c  -     C-reactive Protein  -     [place] Spotted Fever, IgM  -     [place] Spotted Fever, IgG  -     Lyme Disease, PCR  -     Ehrlichia Antibody Panel  -     Cyclic Citrul Peptide Antibody, IgG / IgA  -     Rheumatoid Factor  -     Sedimentation Rate  -     Uric Acid  -     Antistreptolysin O Titer  -     Nuclear Antigen Antibody, IFA  -     CK  -     Myoglobin, Serum  -     Vitamin B6  -     Zinc  -     Magnesium  -     Vitamin B1, Whole Blood  -     Niacin (Vitamin B3)  -     Vitamin D 25 hydroxy  -     Home Sleep Study  -     Ambulatory  Referral to Ophthalmology  -     Vitamin D 1,25 Dihydroxy  -     Phosphorus  -     Iron Profile  -     Protein Elec + Interp, Serum  -     Folate    7. Major depressive disorder in partial remission, unspecified whether recurrent (CMS/HCC)  -     Lipid Panel  -     CBC & Differential  -     Vitamin B12  -     Comprehensive Metabolic Panel  -     TSH  -     T4, Free  -     Hemoglobin A1c  -     C-reactive Protein  -     [place] Spotted Fever, IgM  -     [place] Spotted Fever, IgG  -     Lyme Disease, PCR  -     Ehrlichia Antibody Panel  -     Cyclic Citrul Peptide Antibody, IgG / IgA  -     Rheumatoid Factor  -     Sedimentation Rate  -     Uric Acid  -     Antistreptolysin O Titer  -     Nuclear Antigen Antibody, IFA  -     CK  -     Myoglobin, Serum  -     Vitamin B6  -     Zinc  -     Magnesium  -     Vitamin B1, Whole Blood  -     Niacin (Vitamin B3)  -     Vitamin D 25 hydroxy  -     Home Sleep Study  -     Ambulatory Referral to Ophthalmology  -     Vitamin D 1,25 Dihydroxy  -     Phosphorus  -     Iron Profile  -     Protein Elec + Interp, Serum  -     Folate    8. Glaucoma, unspecified glaucoma type, unspecified laterality    9. Abnormal finding of blood chemistry, unspecified   -     Hemoglobin A1c    10. Vitamin D deficiency, unspecified   -     Vitamin D 25 hydroxy    11. Nonrheumatic aortic valve stenosis  -     Ambulatory Referral to Cardiology    12. Abdominal aortic aneurysm (AAA) without rupture (CMS/HCC)  -     Ambulatory Referral to Vascular Surgery    13. COPD exacerbation (CMS/[facility])    14. Thyroid nodule  -     US Thyroid    Other orders  -     fluticasone-salmeterol (Advair HFA) 230-21 MCG/ACT inhaler; Inhale 2 puffs by mouth 2 (Two) Times a Day.  Dispense: 12 g; Refill: 11    change advire power to mist see if improves issues.      Return in about 1 week (around 12/8/2020).          There are no Patient Instructions on file for this visit.     Sanjeev Rawls MD    Assessment/Plan

## 2020-12-02 ENCOUNTER — TELEPHONE (OUTPATIENT)
Dept: INTERNAL MEDICINE | Facility: CLINIC | Age: 65
End: 2020-12-02

## 2020-12-02 NOTE — TELEPHONE ENCOUNTER
Interval History: Seen and examined at bedside. Hospital chart reviewed. No acute interval detrimental events noted. She reports that she  has moderately improved. ROBOTIC CHOLECYSTECTOMY YESTERDAY. CPAP ordered. Oxygen supplementation at 4 L /min. Keep SAO2 > 888%.       Review of Systems   Constitutional: Negative for malaise/fatigue and weight loss.   HENT: Negative for ear discharge and ear pain.    Eyes: Negative for photophobia and pain.   Respiratory: Negative for hemoptysis and shortness of breath.    Cardiovascular: Negative for chest pain and palpitations.   Gastrointestinal: Negative for diarrhea and nausea.   Genitourinary: Negative for dysuria and urgency.   Musculoskeletal: Negative for myalgias and neck pain.   Skin: Negative for rash.   Neurological: Negative for tremors and headaches.   Endo/Heme/Allergies: Negative for environmental allergies.   Psychiatric/Behavioral: Negative for hallucinations and substance abuse.     Objective:     Vital Signs (Most Recent):  Temp: 98.6 °F (37 °C) (04/04/18 1117)  Pulse: 74 (04/04/18 1117)  Resp: 18 (04/04/18 1117)  BP: (!) 149/85 (04/04/18 1117)  SpO2: (!) 93 % (04/04/18 1117) Vital Signs (24h Range):  Temp:  [98.4 °F (36.9 °C)-100.7 °F (38.2 °C)] 98.6 °F (37 °C)  Pulse:  [] 74  Resp:  [14-20] 18  SpO2:  [93 %-99 %] 93 %  BP: (149-184)/(71-89) 149/85     Weight: 108.6 kg (239 lb 6.7 oz)  Body mass index is 46.76 kg/m².      Intake/Output Summary (Last 24 hours) at 04/04/18 1136  Last data filed at 04/04/18 0800   Gross per 24 hour   Intake             1290 ml   Output              565 ml   Net              725 ml       Physical Exam   Constitutional: She is oriented to person, place, and time. She appears well-developed and well-nourished.   HENT:   Head: Normocephalic.   Right Ear: External ear normal.   Left Ear: External ear normal.   Nose: Nose normal.   Eyes: Pupils are equal, round, and reactive to light. No scleral icterus.   Neck: Normal range of  PATIENT IS NEEDING A PRE-APPROVAL FOR UBRELVY FOR INSURANCE APPROVAL. PATIENT WOULD LIKE THE MEDICATION SENT TO [facility] - [facility]    PATIENT TELEPHONE NUMBER VERIFIED [phone].    motion. Neck supple. No thyromegaly present.   Cardiovascular: Normal rate, regular rhythm and normal heart sounds.    No murmur heard.  Pulmonary/Chest: Effort normal and breath sounds normal.   Abdominal: Soft. Bowel sounds are normal. She exhibits no distension. There is no tenderness. There is no guarding.   Musculoskeletal: Normal range of motion.   Lymphadenopathy:     She has no cervical adenopathy.   Neurological: She is alert and oriented to person, place, and time.   Skin: Skin is warm and dry.       Vents:  Oxygen Concentration (%): 1 (04/04/18 0731)    Lines/Drains/Airways     Drain                 Drain/Device  04/02/18 1440 abdomen collapsible closed device 1 day                Significant Labs:      Laboratory Data:  Reviewed recent labs. Appear stable other than abnormalities addressed in plan.     Radiology:  Reviewed recent imaging studies. Appear stable other than abnormalities addressed in plan.

## 2020-12-02 NOTE — TELEPHONE ENCOUNTER
This is not on her medication list, you will need to order the medication and I will send the pa through cover my meds.

## 2020-12-03 ENCOUNTER — CONSULT (OUTPATIENT)
Dept: CARDIOLOGY | Facility: CLINIC | Age: 65
End: 2020-12-03

## 2020-12-03 VITALS
RESPIRATION RATE: 18 BRPM | OXYGEN SATURATION: 98 % | DIASTOLIC BLOOD PRESSURE: 72 MMHG | BODY MASS INDEX: 44.15 KG/M2 | SYSTOLIC BLOOD PRESSURE: 130 MMHG | HEART RATE: 80 BPM | HEIGHT: 65 IN | WEIGHT: 265 LBS

## 2020-12-03 DIAGNOSIS — I35.0 AORTIC STENOSIS, MILD: Primary | ICD-10-CM

## 2020-12-03 DIAGNOSIS — N18.32 CHRONIC RENAL IMPAIRMENT, STAGE 3B (HCC): ICD-10-CM

## 2020-12-03 DIAGNOSIS — I35.0 NONRHEUMATIC AORTIC VALVE STENOSIS: ICD-10-CM

## 2020-12-03 DIAGNOSIS — I20.1 PRINZMETAL ANGINA (HCC): ICD-10-CM

## 2020-12-03 DIAGNOSIS — D83.9 COMMON VARIABLE IMMUNODEFICIENCY (HCC): ICD-10-CM

## 2020-12-03 DIAGNOSIS — I71.40 ABDOMINAL AORTIC ANEURYSM (AAA) WITHOUT RUPTURE (HCC): ICD-10-CM

## 2020-12-03 DIAGNOSIS — I50.32 CHRONIC DIASTOLIC HEART FAILURE (HCC): ICD-10-CM

## 2020-12-03 PROBLEM — N18.30 CHRONIC RENAL IMPAIRMENT, STAGE 3 (MODERATE): Status: ACTIVE | Noted: 2020-12-01

## 2020-12-03 PROCEDURE — 93000 ELECTROCARDIOGRAM COMPLETE: CPT | Performed by: INTERNAL MEDICINE

## 2020-12-03 PROCEDURE — 99204 OFFICE O/P NEW MOD 45 MIN: CPT | Performed by: INTERNAL MEDICINE

## 2020-12-03 NOTE — PROGRESS NOTES
[facility] Cardiology OP Consult Note    Raquel Mariee  [id number]  2020    Referred By: Sanjeev Rawls MD    Chief Complaint: Reestablish cardiac care    History of Present Illness:   Mrs. Raquel Mariee is a 65 y.o. female who presents to the Cardiology Clinic to reestablish cardiac care.  The patient has a past medical history significant for common variable immunodeficiency on chronic IVIG infusions, stage III chronic kidney disease, hypothyroidism, anemia, type 2 diabetes mellitus, hypertension, and an aortic aneurysm reportedly measuring approximately 4.0 cm on last assessment.  She has a past cardiac history significant for chronic diastolic heart failure, Prinzmetal's angina maintained on isosorbide, and mild aortic stenosis based on echocardiogram in 2019.  She presents the cardiology clinic today to reestablish care after moving from [place].  Currently, the patient reports she is doing well from a cardiac standpoint.  She reports no significant episodes of chest pain while on isosorbide.  In terms of her aortic stenosis, there was reportedly mild on assessment 2019.  She denies any significant worsening of her chronic dyspnea.  No presyncopal or syncopal episodes.  She does report mild lower extremity edema, for which she is maintained on twice daily Lasix per nephrology.  She does report a history of flash pulmonary edema shortly after receiving IVIG infusions.  No specific complaints at this time.    Past Cardiac Testin. Last Coronary Angio: None  2. Prior Stress Testing: Unknown  3. Last Echo: 2019, records pending  4. Prior Holter Monitor: None    Review of Systems:   Review of Systems   Constitutional: Negative for activity change, appetite change, chills, diaphoresis, fatigue, fever, unexpected weight gain and unexpected weight loss.   Eyes: Negative for blurred vision and double vision.   Respiratory: Positive for shortness of breath. Negative for cough,  chest tightness and wheezing.    Cardiovascular: Positive for leg swelling. Negative for chest pain and palpitations.   Gastrointestinal: Negative for abdominal pain, anal bleeding, blood in stool and GERD.   Endocrine: Negative for cold intolerance and heat intolerance.   Genitourinary: Negative for hematuria.   Neurological: Negative for dizziness, syncope, weakness and light-headedness.   Hematological: Does not bruise/bleed easily.   Psychiatric/Behavioral: Negative for depressed mood and stress. The patient is not nervous/anxious.        Past Medical History:   Past Medical History:   Diagnosis Date   • Anxiety    • COPD (chronic obstructive pulmonary disease) (CMS/HCC)    • Depression    • Diabetes mellitus (CMS/HCC)    • Eosinophilic asthma    • Fibromyalgia, primary    • Hypertension    • Hypogammaglobulinemia (CMS/HCC)    • Hypothyroidism    • Irritable bowel syndrome    • Migraines    • Morbid obesity (CMS/HCC)    • Osteoarthritis    • Prinzmetal angina (CMS/HCC)    • Prinzmetal angina (CMS/HCC) 1990   • PTSD (post-traumatic stress disorder)    • Renal insufficiency    • Sinus tachycardia 1990   • TIA (transient ischemic attack)        Past Surgical History:   Past Surgical History:   Procedure Laterality Date   • APPENDECTOMY     • BUNIONECTOMY     • GASTRIC BYPASS     • HAND SURGERY Right    • REPLACEMENT TOTAL KNEE BILATERAL     • TONSILLECTOMY     • TOTAL ABDOMINAL HYSTERECTOMY WITH SALPINGO OOPHORECTOMY         Family History:   Family History   Problem Relation Age of Onset   • Diabetes Mother    • Stroke Mother    • Heart disease Mother    • Hypertension Mother    • Endometriosis Mother    • Diabetes Father    • Hypertension Father    • Stroke Father    • Heart attack Father    • Asthma Father    • COPD Father    • COPD Sister    • Asthma Sister    • Cancer Sister    • Brain cancer Sister    • Diabetes Sister    • Stroke Sister    • Heart attack Sister    • Endometriosis Sister    • COPD Brother     • Asthma Brother    • Diabetes Brother    • Asthma Daughter    • Endometriosis Daughter    • Osteoarthritis Daughter    • Hypertension Daughter    • Kidney failure Daughter    • Irritable bowel syndrome Daughter    • Asthma Son    • Asthma Brother    • COPD Brother    • Diabetes Brother    • Hypertension Brother    • Kidney disease Sister    • Diabetes Sister    • COPD Sister    • Asthma Sister    • Endometriosis Sister    • Heart attack Sister    • Endometriosis Sister    • Asthma Daughter    • Endometriosis Daughter    • Osteoarthritis Daughter        Social History:   Social History     Socioeconomic History   • Marital status:      Spouse name: Not on file   • Number of children: Not on file   • Years of education: Not on file   • Highest education level: Not on file   Tobacco Use   • Smoking status: Never Smoker   • Smokeless tobacco: Never Used   Substance and Sexual Activity   • Alcohol use: Never     Frequency: Never   • Drug use: Never   • Sexual activity: Defer       Medications:     Current Outpatient Medications:   •  acetaminophen-codeine (TYLENOL #3) 300-30 MG per tablet, Take 2 tablets by mouth Every 6 (Six) Hours As Needed for Moderate Pain ., Disp: , Rfl:   •  amitriptyline (ELAVIL) 10 MG tablet, Take 10 mg by mouth 2 (Two) Times a Day., Disp: , Rfl:   •  buPROPion SR (WELLBUTRIN SR) 100 MG 12 hr tablet, Take 100 mg by mouth 2 (Two) Times a Day., Disp: , Rfl:   •  butalbital-acetaminophen-caffeine (FIORICET, ESGIC) -40 MG per tablet, Take 1 tablet by mouth Every 4 (Four) Hours As Needed for Headache., Disp: , Rfl:   •  calcium carbonate (Antacid Maximum) 1000 MG chewable tablet, Chew 1,000 mg 3 (Three) Times a Day., Disp: , Rfl:   •  CBD (cannabidiol) oral oil, Take  by mouth 2 (Two) Times a Day., Disp: , Rfl:   •  cholecalciferol (VITAMIN D3) 25 MCG (1000 UT) tablet, Take 1,000 Units by mouth Daily., Disp: , Rfl:   •  clonazePAM (KlonoPIN) 0.5 MG tablet, Take 0.5 mg by mouth At Night  As Needed., Disp: , Rfl:   •  Dextromethorphan-guaiFENesin (Mucus-DM)  MG tablet sustained-release 12 hour, Take  by mouth., Disp: , Rfl:   •  dicyclomine (BENTYL) 10 MG capsule, Take 10 mg by mouth 3 (Three) Times a Day Before Meals., Disp: , Rfl:   •  famotidine (PEPCID) 20 MG tablet, Take 20 mg by mouth 2 (Two) Times a Day., Disp: , Rfl:   •  ferrous sulfate 325 (65 Fe) MG tablet, Take  by mouth., Disp: , Rfl:   •  fexofenadine (ALLEGRA) 180 MG tablet, Take 180 mg by mouth Daily., Disp: , Rfl:   •  fluticasone (FLOVENT DISKUS) 50 MCG/BLIST diskus inhaler, Inhale 1 puff 2 (Two) Times a Day., Disp: , Rfl:   •  fluticasone-salmeterol (Advair HFA) 230-21 MCG/ACT inhaler, Inhale 2 puffs by mouth 2 (Two) Times a Day., Disp: 12 g, Rfl: 11  •  furosemide (LASIX) 40 MG tablet, Take 40 mg by mouth 2 (Two) Times a Day., Disp: , Rfl:   •  gabapentin (NEURONTIN) 100 MG capsule, Take 100 mg by mouth 3 (Three) Times a Day., Disp: , Rfl:   •  gabapentin (NEURONTIN) 300 MG capsule, Take 300 mg by mouth Every Night., Disp: , Rfl:   •  glucose-vitamin C ([employer] Glucose) 4-6 GM-MG chewable tablet chewable tablet, Chew., Disp: , Rfl:   •  guaiFENesin-codeine (Virtussin A/C) 100-10 MG/5ML liquid, Take 5 mL by mouth 3 (Three) Times a Day As Needed for Cough., Disp: , Rfl:   •  hydrOXYzine pamoate (VISTARIL) 50 MG capsule, Take 50 mg by mouth Every Night., Disp: , Rfl:   •  isosorbide dinitrate (ISORDIL) 10 MG tablet, Take 10 mg by mouth 3 (Three) Times a Day., Disp: , Rfl:   •  levothyroxine (SYNTHROID, LEVOTHROID) 50 MCG tablet, Take 50 mcg by mouth Daily., Disp: , Rfl:   •  Magnesium 250 MG tablet, Take  by mouth., Disp: , Rfl:   •  melatonin 5 MG tablet tablet, Take 5 mg by mouth Every Night., Disp: , Rfl:   •  mepolizumab (NUCALA) 100 MG reconstituted solution injection, Inject 100 mg under the skin into the appropriate area as directed Every 30 (Thirty) Days., Disp: , Rfl:   •  montelukast (SINGULAIR) 10 MG tablet, Take 10  mg by mouth Every Night., Disp: , Rfl:   •  multivitamin with minerals (ONE-A-DAY PROACTIVE 65+ PO), Take 1 tablet by mouth Daily., Disp: , Rfl:   •  omeprazole (priLOSEC) 40 MG capsule, Take 40 mg by mouth 2 (two) times a day., Disp: , Rfl:   •  ondansetron (ZOFRAN) 4 MG tablet, Take 4 mg by mouth Every 8 (Eight) Hours As Needed for Nausea or Vomiting., Disp: , Rfl:   •  potassium chloride 10 MEQ CR tablet, Take 10 mEq by mouth 2 (Two) Times a Day., Disp: , Rfl:   •  potassium citrate (UROCIT-K) 10 MEQ (1080 MG) CR tablet, Take 10 mEq by mouth Daily., Disp: , Rfl:   •  prazosin (MINIPRESS) 2 MG capsule, Take 2 mg by mouth Every Night., Disp: , Rfl:   •  predniSONE (DELTASONE) 20 MG tablet, Take 20 mg by mouth 2 (Two) Times a Day., Disp: , Rfl:   •  tiotropium (SPIRIVA) 18 MCG per inhalation capsule, Place 1 capsule into inhaler and inhale Daily., Disp: , Rfl:   •  TiZANidine (ZANAFLEX) 2 MG capsule, Take 2 mg by mouth 3 (Three) Times a Day., Disp: , Rfl:   •  valsartan (DIOVAN) 160 MG tablet, Take 160 mg by mouth Daily., Disp: , Rfl:   •  vitamin B-12 (CYANOCOBALAMIN) 1000 MCG tablet, Take 1,000 mcg by mouth 2 (two) times a day., Disp: , Rfl:   •  ubrogepant (ubrogepant) 100 MG tablet, Take 1 tablet by mouth Daily As Needed (ha)., Disp: 10 tablet, Rfl: 11    Allergies:   Allergies   Allergen Reactions   • Ambien [Zolpidem Tartrate] Mental Status Change   • Cardizem [Diltiazem Hcl] Swelling   • Hydrocodone GI Intolerance   • Lexapro [Escitalopram Oxalate] Other (See Comments)     Kidney Failure   • Mobic [Meloxicam] Other (See Comments)     CKD   • Topamax [Topiramate] Other (See Comments)     Memory loss and twitching.   • Verapamil Nausea And Vomiting     Dizzy   • Cephalosporins Hives   • Edecrin [Ethacrynic Acid] Other (See Comments)     Weight gain   • Hydrochlorothiazide Hives   • Sulfa Antibiotics Hives       Physical Exam:  Vital Signs:   Vitals:    12/03/20 1501 12/03/20 1510   BP: 128/72 130/72   BP  "Location: Right arm Left arm   Patient Position: Sitting Sitting   Pulse: 80    Resp: 18    SpO2: 98%    Weight: 120 kg (265 lb)    Height: 165.1 cm (65\")        Physical Exam  Vitals signs and nursing note reviewed.   Constitutional:       General: She is not in acute distress.     Appearance: She is well-developed. She is obese. She is not diaphoretic.   HENT:      Head: Normocephalic and atraumatic.   Eyes:      General: No scleral icterus.     Pupils: Pupils are equal, round, and reactive to light.   Neck:      Musculoskeletal: Neck supple. No muscular tenderness.      Trachea: No tracheal deviation.   Cardiovascular:      Rate and Rhythm: Normal rate and regular rhythm.      Heart sounds: Normal heart sounds. No friction rub. No gallop.       Comments: Normal JVD.  Soft systolic murmur over the aortic area  Pulmonary:      Effort: Pulmonary effort is normal. No respiratory distress.      Breath sounds: Normal breath sounds. No stridor. No wheezing or rales.   Chest:      Chest wall: No tenderness.   Abdominal:      General: Bowel sounds are normal. There is no distension.      Palpations: Abdomen is soft.      Tenderness: There is no abdominal tenderness. There is no guarding or rebound.   Musculoskeletal: Normal range of motion.      Comments: Trace lower extremity edema   Lymphadenopathy:      Cervical: No cervical adenopathy.   Skin:     General: Skin is warm and dry.      Findings: No erythema.   Neurological:      Mental Status: She is alert and oriented to person, place, and time.   Psychiatric:         Behavior: Behavior normal.         Results Review:   I reviewed the patient's new clinical results.  I personally viewed and interpreted the patient's EKG/Telemetry data      ECG 12 Lead    Date/Time: 12/3/2020 3:36 PM  Performed by: Mingo Lovelace MD  Authorized by: Mingo Lovelace MD   Comparison: not compared with previous ECG   Rhythm: sinus rhythm  Ectopy: unifocal PVCs  Rate: normal  QRS axis: " normal  Other findings comments: Cannot rule out prior septal MI    Clinical impression: abnormal EKG            Assessment / Plan:     1. Aortic stenosis  --History of mild aortic stenosis as well as mild AI on echocardiogram in 2019  --Murmur on exam today consistent with mild aortic stenosis  --Appears to be asymptomatic  --Will obtain repeat echocardiogram to reassess and obtain baseline images    2. Chronic diastolic heart failure (CMS/HCC)  --Reported history of chronic diastolic heart failure in the setting of underlying CKD  --Appears to be near euvolemic on exam today  --Continue Lasix at current dosing, per nephrology    3.  Prinzmetal angina   --Currently chest pain-free with symptoms well controlled  --Continue isosorbide as antianginal    4. Abdominal aortic aneurysm (AAA)  --Will obtain records for review  --Has pending referral to vascular surgery    5. Common variable immunodeficiency   --Receives monthly IVIG infusions    8. Chronic renal impairment, stage 3b  --Followed by nephrology      Follow Up:   Return in about 6 months (around 6/3/2021).      Thank you for allowing me to participate in the care of your patient. Please to not hesitate to contact me with additional questions or concerns.     SENTHIL Lovelace MD  Interventional Cardiology   12/03/2020  15:09 EST

## 2020-12-04 ENCOUNTER — PRIOR AUTHORIZATION (OUTPATIENT)
Dept: INTERNAL MEDICINE | Facility: CLINIC | Age: 65
End: 2020-12-04

## 2020-12-04 LAB
1,25(OH)2D SERPL-MCNC: 60.3 PG/ML (ref 19.9–79.3)
25(OH)D3+25(OH)D2 SERPL-MCNC: 47.6 NG/ML (ref 30–100)
A PHAGOCYTOPH IGG TITR SER IF: NEGATIVE {TITER}
A PHAGOCYTOPH IGM TITR SER IF: NEGATIVE {TITER}
ALBUMIN SERPL ELPH-MCNC: 4.1 G/DL (ref 2.9–4.4)
ALBUMIN SERPL-MCNC: 4.8 G/DL (ref 3.5–5.2)
ALBUMIN/GLOB SERPL: 1.3 {RATIO} (ref 0.7–1.7)
ALBUMIN/GLOB SERPL: 2 G/DL
ALP SERPL-CCNC: 73 U/L (ref 39–117)
ALPHA1 GLOB SERPL ELPH-MCNC: 0.2 G/DL (ref 0–0.4)
ALPHA2 GLOB SERPL ELPH-MCNC: 0.8 G/DL (ref 0.4–1)
ALT SERPL-CCNC: 19 U/L (ref 1–33)
ANA HOMOGEN TITR SER: ABNORMAL {TITER}
ANA SPECKLED TITR SER: ABNORMAL {TITER}
ANA TITR SER IF: POSITIVE {TITER}
ASO AB SERPL-ACNC: 90.7 IU/ML (ref 0–200)
AST SERPL-CCNC: 27 U/L (ref 1–32)
B BURGDOR DNA SPEC QL NAA+PROBE: NEGATIVE
B-GLOBULIN SERPL ELPH-MCNC: 1 G/DL (ref 0.7–1.3)
BASOPHILS # BLD AUTO: 0.05 10*3/MM3 (ref 0–0.2)
BASOPHILS NFR BLD AUTO: 1 % (ref 0–1.5)
BILIRUB SERPL-MCNC: 0.4 MG/DL (ref 0–1.2)
BUN SERPL-MCNC: 13 MG/DL (ref 8–23)
BUN/CREAT SERPL: 8.6 (ref 7–25)
CALCIUM SERPL-MCNC: 9.4 MG/DL (ref 8.6–10.5)
CCP IGA+IGG SERPL IA-ACNC: 7 UNITS (ref 0–19)
CHLORIDE SERPL-SCNC: 104 MMOL/L (ref 98–107)
CHOLEST SERPL-MCNC: 229 MG/DL (ref 0–200)
CK SERPL-CCNC: 231 U/L (ref 20–180)
CO2 SERPL-SCNC: 25.3 MMOL/L (ref 22–29)
CREAT SERPL-MCNC: 1.51 MG/DL (ref 0.57–1)
CRP SERPL-MCNC: 0.24 MG/DL (ref 0–0.5)
E CHAFFEENSIS IGG TITR SER IF: NEGATIVE {TITER}
E CHAFFEENSIS IGM TITR SER IF: NEGATIVE {TITER}
EOSINOPHIL # BLD AUTO: 0.04 10*3/MM3 (ref 0–0.4)
EOSINOPHIL NFR BLD AUTO: 0.8 % (ref 0.3–6.2)
ERYTHROCYTE [DISTWIDTH] IN BLOOD BY AUTOMATED COUNT: 13 % (ref 12.3–15.4)
ERYTHROCYTE [SEDIMENTATION RATE] IN BLOOD BY WESTERGREN METHOD: 13 MM/HR (ref 0–30)
FOLATE SERPL-MCNC: >20 NG/ML (ref 4.78–24.2)
GAMMA GLOB SERPL ELPH-MCNC: 1 G/DL (ref 0.4–1.8)
GLOBULIN SER CALC-MCNC: 2.4 GM/DL
GLOBULIN SER CALC-MCNC: 3.1 G/DL (ref 2.2–3.9)
GLUCOSE SERPL-MCNC: 130 MG/DL (ref 65–99)
HBA1C MFR BLD: 6.8 % (ref 4.8–5.6)
HCT VFR BLD AUTO: 37.8 % (ref 34–46.6)
HDLC SERPL-MCNC: 85 MG/DL (ref 40–60)
HGB BLD-MCNC: 13 G/DL (ref 12–15.9)
IMM GRANULOCYTES # BLD AUTO: 0.02 10*3/MM3 (ref 0–0.05)
IMM GRANULOCYTES NFR BLD AUTO: 0.4 % (ref 0–0.5)
IRON SATN MFR SERPL: 33 % (ref 20–50)
IRON SERPL-MCNC: 107 MCG/DL (ref 37–145)
LABORATORY COMMENT REPORT: NORMAL
LDLC SERPL CALC-MCNC: 124 MG/DL (ref 0–100)
LYMPHOCYTES # BLD AUTO: 1.36 10*3/MM3 (ref 0.7–3.1)
LYMPHOCYTES NFR BLD AUTO: 26.9 % (ref 19.6–45.3)
Lab: ABNORMAL
M PROTEIN SERPL ELPH-MCNC: NORMAL G/DL
MAGNESIUM SERPL-MCNC: 2.3 MG/DL (ref 1.6–2.4)
MCH RBC QN AUTO: 31.8 PG (ref 26.6–33)
MCHC RBC AUTO-ENTMCNC: 34.4 G/DL (ref 31.5–35.7)
MCV RBC AUTO: 92.4 FL (ref 79–97)
MONOCYTES # BLD AUTO: 0.41 10*3/MM3 (ref 0.1–0.9)
MONOCYTES NFR BLD AUTO: 8.1 % (ref 5–12)
MYOGLOBIN SERPL-MCNC: 100 NG/ML (ref 25–58)
NEUTROPHILS # BLD AUTO: 3.18 10*3/MM3 (ref 1.7–7)
NEUTROPHILS NFR BLD AUTO: 62.8 % (ref 42.7–76)
NIACIN SERPL-MCNC: <5 NG/ML (ref 0–5)
NICOTINAMIDE SERPL-MCNC: 14.6 NG/ML (ref 5.2–72.1)
NRBC BLD AUTO-RTO: 0 /100 WBC (ref 0–0.2)
PHOSPHATE SERPL-MCNC: 4 MG/DL (ref 2.5–4.5)
PLATELET # BLD AUTO: 297 10*3/MM3 (ref 140–450)
POTASSIUM SERPL-SCNC: 4.7 MMOL/L (ref 3.5–5.2)
PROT PATTERN SERPL ELPH-IMP: NORMAL
PROT SERPL-MCNC: 7.2 G/DL (ref 6–8.5)
R RICKETTSI IGG SER QL IA: NEGATIVE
R RICKETTSI IGM SER-ACNC: 0.26 INDEX (ref 0–0.89)
RBC # BLD AUTO: 4.09 10*6/MM3 (ref 3.77–5.28)
RHEUMATOID FACT SERPL-ACNC: <10 IU/ML (ref 0–13.9)
SODIUM SERPL-SCNC: 139 MMOL/L (ref 136–145)
T4 FREE SERPL-MCNC: 1.05 NG/DL (ref 0.93–1.7)
TIBC SERPL-MCNC: 326 MCG/DL
TRIGL SERPL-MCNC: 115 MG/DL (ref 0–150)
TSH SERPL DL<=0.005 MIU/L-ACNC: 1.31 UIU/ML (ref 0.27–4.2)
UIBC SERPL-MCNC: 219 MCG/DL (ref 112–346)
URATE SERPL-MCNC: 5.6 MG/DL (ref 2.4–5.7)
VIT B1 BLD-SCNC: 112.4 NMOL/L (ref 66.5–200)
VIT B12 SERPL-MCNC: >2000 PG/ML (ref 211–946)
VIT B6 SERPL-MCNC: 25 UG/L (ref 2–32.8)
VLDLC SERPL CALC-MCNC: 20 MG/DL (ref 5–40)
WBC # BLD AUTO: 5.06 10*3/MM3 (ref 3.4–10.8)
ZINC SERPL-MCNC: 72 UG/DL (ref 56–134)

## 2020-12-07 ENCOUNTER — OFFICE VISIT (OUTPATIENT)
Dept: PSYCHIATRY | Facility: CLINIC | Age: 65
End: 2020-12-07

## 2020-12-07 VITALS
RESPIRATION RATE: 18 BRPM | BODY MASS INDEX: 43.65 KG/M2 | WEIGHT: 262 LBS | HEIGHT: 65 IN | HEART RATE: 76 BPM | TEMPERATURE: 98.7 F | DIASTOLIC BLOOD PRESSURE: 70 MMHG | SYSTOLIC BLOOD PRESSURE: 128 MMHG

## 2020-12-07 DIAGNOSIS — F43.10 POST TRAUMATIC STRESS DISORDER (PTSD): ICD-10-CM

## 2020-12-07 DIAGNOSIS — F41.1 GAD (GENERALIZED ANXIETY DISORDER): ICD-10-CM

## 2020-12-07 DIAGNOSIS — F33.41 RECURRENT MAJOR DEPRESSIVE DISORDER, IN PARTIAL REMISSION (HCC): Primary | ICD-10-CM

## 2020-12-07 PROCEDURE — 90792 PSYCH DIAG EVAL W/MED SRVCS: CPT | Performed by: NURSE PRACTITIONER

## 2020-12-07 NOTE — PROGRESS NOTES
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 [employer]. It has not been cleared or approved  by the Food and Drug Administration.     • 25 Hydroxy, Vitamin D 12/01/2020 47.6  30.0 - 100.0 ng/ml Final    Comment: Results may be falsely increased if patient taking Biotin.  Reference Range for Total Vitamin D 25(OH)  Deficiency <20.0 ng/mL  Insufficiency 21-29 ng/mL  Sufficiency  ng/mL  Toxicity >100 ng/ml     • 1,25-Dihydroxy, Vitamin D 12/01/2020 60.3  19.9 - 79.3 pg/mL Final   • Phosphorus 12/01/2020 4.0  2.5 - 4.5 mg/dL Final   • TIBC 12/01/2020 326  mcg/dL Final   • UIBC 12/01/2020 219  112 - 346 mcg/dL Final   • Iron 12/01/2020 107  37 - 145 mcg/dL Final   • Iron Saturation 12/01/2020 33  20 - 50 % Final   • Albumin 12/01/2020 4.1  2.9 - 4.4 g/dL Final   • Alpha-1-Globulin 12/01/2020 0.2  0.0 - 0.4 g/dL Final   • Alpha-2-Globulin 12/01/2020 0.8  0.4 - 1.0 g/dL Final   • Beta Globulin 12/01/2020 1.0  0.7 - 1.3 g/dL Final   • Gamma Globulin 12/01/2020 1.0  0.4 - 1.8 g/dL Final   • M-Isma 12/01/2020 Not Observed  Not Observed g/dL Final   • Globulin 12/01/2020 3.1  2.2 - 3.9 g/dL Final   • A/G Ratio 12/01/2020 1.3  0.7 - 1.7 Final   • Please note 12/01/2020 Comment   Final    Comment: Protein electrophoresis scan will follow via computer, mail, or   delivery.     • SPE  Interpretation 12/01/2020 Comment   Final    Comment: The SPE pattern appears unremarkable. Evidence of monoclonal  protein is not apparent.     • Folate 12/01/2020 >20.00  4.78 - 24.20 ng/mL Final    Results may be falsely increased if patient taking Biotin.   • Homogeneous Pattern 12/01/2020 1:160*  Final   • Speckled Pattern 12/01/2020 1:80   Final   • Note: (Reference) 12/01/2020 Comment   Final    Comment: A positive BRYAN result may occur in healthy individuals (low  titer) or be associated with a variety of diseases.  See  interpretation chart which is not all inclusive:  Pattern      Antigen Detected  Suggested Disease Association  -----------  ----------------  -----------------------------  Homogeneous  DNA(ds,ss),       SLE - High titers               Nucleosomes,               Histones          Drug-induced SLE  -----------  ----------------  -----------------------------  Speckled     Sm, RNP, SCL-70,  SLE,MCTD,PSS (diffuse form),               SS-A/SS-B         Sjogrens  -----------  ----------------  -----------------------------  Nucleolar    SCL-70, PM-1/SCL  High titers Scleroderma,                                 PM/DM  -----------  ----------------  -----------------------------  Centromere   Centromere        PSS (limited form) w/Crest                                 syndrome variable  -----------  ----------------  -----------------------------  Nuclear Dot  [id number]                           in  Primary Biliary Cirrhosis  -----------  ----------------  -----------------------------  Nuclear      ,            Primary Biliary Cirrhosis  Membrane     brittni A,B,C  -----------  ----------------  -----------------------------         Assessment/Plan   Diagnoses and all orders for this visit:    1. Recurrent major depressive disorder, in partial remission (CMS/HCC) (Primary)    2. EMERY (generalized anxiety disorder)    3. Post traumatic stress disorder (PTSD)    Other orders  -     Cancel:  [facility] Drug Screen        Visit Diagnoses:    ICD-10-CM ICD-9-CM   1. Recurrent major depressive disorder, in partial remission (CMS/[facility])  F33.41 296.35   2. EMERY (generalized anxiety disorder)  F41.1 300.02   3. Post traumatic stress disorder (PTSD)  F43.10 309.81       Review:   I have reviewed the patient's previous medical records to include labs, radiology, notes and medications.      Impression:   -This is my initial evaluation of the patient.  Raquel endorses improved symptoms of depression but ongoing anxiety.  She and I had a lengthy discussion about the use of benzodiazepines for treatment for anxiety.  We discussed the risks of long-term use such as memory impairment, dependency, and abuse.  Raquel tells me that her primary care physician is working on decreasing her medications as well.  Raquel will return to her primary care physician tomorrow to discuss medication management and her chronic kidney disease.  I encouraged her to discuss with her PCP whether they will continue prescribing her psychiatric medications or if she needs this provider to prescribe.  I did explain that, in the future, we would work to decrease her benzodiazepine use.  Raquel verbalized understanding.  -Continue Klonopin 0.5 mg nightly as needed for anxiety.  Patient has refills.  -Continue prazosin 2 mg nightly for nightmares related to PTSD.  Patient has refills.  -Continue Wellbutrin  mg twice daily for depression and anxiety.  Patient has refills.  -Continue hydroxyzine 50 mg nightly for anxiety.  Patient has refills.  -Continue melatonin 5 mg nightly for insomnia.  She has refills.    TREATMENT PLAN/GOALS: Continue supportive psychotherapy efforts and medications as indicated. Treatment and medication options discussed during today's visit. Patient ackowledged and verbally consented to continue with current treatment plan and was educated on the importance of compliance with treatment and follow-up  appointments.    MEDICATION ISSUES:    We discussed risks, benefits, and side effects of the above medications and the patient was agreeable with the plan. Patient was educated on the importance of compliance with treatment and follow-up appointments.  Patient is agreeable to call the office with any worsening of symptoms or onset of side effects. Patient is agreeable to call 911 or go to the nearest ER should he/she begin having SI/HI.      Counseled patient regarding multimodal approach with healthy nutrition, healthy sleep, regular physical activity, social activities, counseling, and medications.      Coping skills reviewed and encouraged positive framing of thoughts     Assisted patient in processing above session content; acknowledged and normalized patient’s thoughts, feelings, and concerns.  Applied  positive coping skills and behavior management in session.  Allowed patient to freely discuss issues without interruption or judgment. Provided safe, confidential environment to facilitate the development of positive therapeutic relationship and encourage open, honest communication. Assisted patient in identifying risk factors which would indicate the need for higher level of care including thoughts to harm self or others and/or self-harming behavior and encouraged patient to contact this office, call 911, or present to the nearest emergency room should any of these events occur. Discussed crisis intervention services and means to access.     MEDS ORDERED DURING VISIT:  No orders of the defined types were placed in this encounter.      Return in about 2 months (around 2/7/2021) for Medication Check.             This document has been electronically signed by LILIBETH Kramer  December 7, 2020 13:38 EST    Please note that portions of this note were completed with a voice recognition program. Efforts were made to edit dictation, but occasionally words are mistranscribed.

## 2020-12-08 ENCOUNTER — OFFICE VISIT (OUTPATIENT)
Dept: INTERNAL MEDICINE | Facility: CLINIC | Age: 65
End: 2020-12-08

## 2020-12-08 VITALS
BODY MASS INDEX: 43.65 KG/M2 | OXYGEN SATURATION: 95 % | HEART RATE: 90 BPM | HEIGHT: 65 IN | SYSTOLIC BLOOD PRESSURE: 102 MMHG | DIASTOLIC BLOOD PRESSURE: 68 MMHG | TEMPERATURE: 97.8 F | RESPIRATION RATE: 16 BRPM | WEIGHT: 262 LBS

## 2020-12-08 DIAGNOSIS — M33.20 POLYMYOSITIS (HCC): ICD-10-CM

## 2020-12-08 DIAGNOSIS — M60.9 MYOSITIS, UNSPECIFIED MYOSITIS TYPE, UNSPECIFIED SITE: ICD-10-CM

## 2020-12-08 DIAGNOSIS — G47.33 OSA (OBSTRUCTIVE SLEEP APNEA): Primary | ICD-10-CM
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
## 2020-12-08 PROCEDURE — 99214 OFFICE O/P EST MOD 30 MIN: CPT | Performed by: INTERNAL MEDICINE

## 2020-12-08 RX ORDER — PREDNISONE 20 MG/1
20 TABLET ORAL DAILY
Qty: 30 TABLET | Refills: 1 | Status: SHIPPED | OUTPATIENT
Start: 2020-12-08 | End: 2021-01-15 | Stop reason: SDUPTHER

## 2020-12-08 NOTE — PROGRESS NOTES
Subjective     Patient ID: Raquel Mariee is a 65 y.o. female. Patient is here for management of multiple medical problems.     Chief Complaint   Patient presents with   • Fall     yesterday 12/07/2020, fell getting out of the bed   • Hypothyroidism   • Sleep Apnea     History of Present Illness     Fell last night.  Got up in new home and feel.  Light heade last night and today.     ween ing gabapentin.     Has been in the process of moving.  On salt restriction.   Wt down.      The following portions of the patient's history were reviewed and updated as appropriate: allergies, current medications, past family history, past medical history, past social history, past surgical history and problem list.    Review of Systems   Constitutional: Positive for fatigue. Negative for diaphoresis and fever.   Respiratory: Positive for shortness of breath.    Musculoskeletal: Positive for back pain and gait problem. Negative for joint swelling.   All other systems reviewed and are negative.      Current Outpatient Medications:   •  acetaminophen-codeine (TYLENOL #3) 300-30 MG per tablet, Take 2 tablets by mouth Every 6 (Six) Hours As Needed for Moderate Pain ., Disp: , Rfl:   •  amitriptyline (ELAVIL) 10 MG tablet, Take 10 mg by mouth 2 (Two) Times a Day., Disp: , Rfl:   •  buPROPion SR (WELLBUTRIN SR) 100 MG 12 hr tablet, Take 100 mg by mouth 2 (Two) Times a Day., Disp: , Rfl:   •  calcium carbonate (Antacid Maximum) 1000 MG chewable tablet, Chew 1,000 mg 3 (Three) Times a Day., Disp: , Rfl:   •  CBD (cannabidiol) oral oil, Take  by mouth 2 (Two) Times a Day., Disp: , Rfl:   •  cholecalciferol (VITAMIN D3) 25 MCG (1000 UT) tablet, Take 1,000 Units by mouth Daily., Disp: , Rfl:   •  clonazePAM (KlonoPIN) 0.5 MG tablet, Take 0.5 mg by mouth At Night As Needed., Disp: , Rfl:   •  Dextromethorphan-guaiFENesin (Mucus-DM)  MG tablet sustained-release 12 hour, Take  by mouth., Disp: , Rfl:   •  dicyclomine (BENTYL) 10 MG  capsule, Take 10 mg by mouth 3 (Three) Times a Day Before Meals., Disp: , Rfl:   •  famotidine (PEPCID) 20 MG tablet, Take 20 mg by mouth 2 (Two) Times a Day., Disp: , Rfl:   •  ferrous sulfate 325 (65 Fe) MG tablet, Take  by mouth., Disp: , Rfl:   •  fexofenadine (ALLEGRA) 180 MG tablet, Take 180 mg by mouth Daily., Disp: , Rfl:   •  fluticasone (FLOVENT DISKUS) 50 MCG/BLIST diskus inhaler, Inhale 1 puff 2 (Two) Times a Day., Disp: , Rfl:   •  fluticasone-salmeterol (ADVAIR) 500-50 MCG/DOSE DISKUS, Inhale 1 puff., Disp: , Rfl:   •  furosemide (LASIX) 40 MG tablet, Take 40 mg by mouth 2 (Two) Times a Day., Disp: , Rfl:   •  gabapentin (NEURONTIN) 100 MG capsule, Take 100 mg by mouth Daily., Disp: , Rfl:   •  gabapentin (NEURONTIN) 300 MG capsule, Take 300 mg by mouth Every Night., Disp: , Rfl:   •  glucose-vitamin C ([employer] Glucose) 4-6 GM-MG chewable tablet chewable tablet, Chew., Disp: , Rfl:   •  guaiFENesin-codeine (Virtussin A/C) 100-10 MG/5ML liquid, Take 5 mL by mouth 3 (Three) Times a Day As Needed for Cough., Disp: , Rfl:   •  hydrOXYzine pamoate (VISTARIL) 50 MG capsule, Take 50 mg by mouth Every Night., Disp: , Rfl:   •  isosorbide dinitrate (ISORDIL) 10 MG tablet, Take 10 mg by mouth 3 (Three) Times a Day., Disp: , Rfl:   •  levothyroxine (SYNTHROID, LEVOTHROID) 50 MCG tablet, Take 50 mcg by mouth Daily., Disp: , Rfl:   •  Magnesium 250 MG tablet, Take  by mouth., Disp: , Rfl:   •  melatonin 5 MG tablet tablet, Take 5 mg by mouth Every Night., Disp: , Rfl:   •  mepolizumab (NUCALA) 100 MG reconstituted solution injection, Inject 100 mg under the skin into the appropriate area as directed Every 30 (Thirty) Days., Disp: , Rfl:   •  montelukast (SINGULAIR) 10 MG tablet, Take 10 mg by mouth Every Night., Disp: , Rfl:   •  multivitamin with minerals (ONE-A-DAY PROACTIVE 65+ PO), Take 1 tablet by mouth Daily., Disp: , Rfl:   •  omeprazole (priLOSEC) 40 MG capsule, Take 40 mg by mouth 2 (two) times a day.,  "Disp: , Rfl:   •  ondansetron (ZOFRAN) 4 MG tablet, Take 4 mg by mouth Every 8 (Eight) Hours As Needed for Nausea or Vomiting., Disp: , Rfl:   •  potassium chloride 10 MEQ CR tablet, Take 10 mEq by mouth 2 (Two) Times a Day., Disp: , Rfl:   •  potassium citrate (UROCIT-K) 10 MEQ (1080 MG) CR tablet, Take 10 mEq by mouth Daily., Disp: , Rfl:   •  prazosin (MINIPRESS) 2 MG capsule, Take 2 mg by mouth Every Night., Disp: , Rfl:   •  predniSONE (DELTASONE) 20 MG tablet, Take 20 mg by mouth 2 (Two) Times a Day., Disp: , Rfl:   •  tiotropium (SPIRIVA) 18 MCG per inhalation capsule, Place 1 capsule into inhaler and inhale Daily., Disp: , Rfl:   •  TiZANidine (ZANAFLEX) 2 MG capsule, Take 2 mg by mouth Every Night., Disp: , Rfl:   •  ubrogepant (ubrogepant) 100 MG tablet, Take 1 tablet by mouth at onset of migraine; may repeat in 2 hours if needed. Do not exceed 2 tablets in 24 hours, Disp: 10 tablet, Rfl: 11  •  valsartan (DIOVAN) 160 MG tablet, Take 160 mg by mouth Daily., Disp: , Rfl:   •  vitamin B-12 (CYANOCOBALAMIN) 1000 MCG tablet, Take 1,000 mcg by mouth 2 (two) times a day., Disp: , Rfl:   •  predniSONE (DELTASONE) 20 MG tablet, Take 1 tablet by mouth Daily., Disp: 30 tablet, Rfl: 1    Objective      Blood pressure 102/68, pulse 90, temperature 97.8 °F (36.6 °C), resp. rate 16, height 165.1 cm (65\"), weight 119 kg (262 lb), SpO2 95 %.    Physical Exam     General Appearance:    Alert, cooperative, no distress, appears stated age   Head:    Normocephalic, without obvious abnormality, atraumatic   Eyes:    PERRL, conjunctiva/corneas clear, EOM's intact   Ears:    Normal TM's and external ear canals, both ears   Nose:   Nares normal, septum midline, mucosa normal, no drainage   or sinus tenderness   Throat:   Lips, mucosa, and tongue normal; teeth and gums normal   Neck:   Supple, symmetrical, trachea midline, no adenopathy;        thyroid:  No enlargement/tenderness/nodules; no carotid    bruit or JVD   Back:     " Symmetric, no curvature, ROM normal, no CVA tenderness   Lungs:     Clear to auscultation bilaterally, respirations unlabored   Chest wall:    No tenderness or deformity   Heart:    Regular rate and rhythm, S1 and S2 normal, no murmur,        rub or gallop   Abdomen:   Soft tissue bruising on back right side.     Extremities:   Extremities normal, atraumatic, no cyanosis or edema   Pulses:   2+ and symmetric all extremities   Skin:   Skin color, texture, turgor normal, no rashes or lesions   Lymph nodes:   Cervical, supraclavicular, and axillary nodes normal   Neurologic:   CNII-XII intact. Normal strength, sensation and reflexes       throughout      Results for orders placed or performed in visit on 12/01/20   Lipid Panel    Specimen: Blood   Result Value Ref Range    Total Cholesterol 229 (H) 0 - 200 mg/dL    Triglycerides 115 0 - 150 mg/dL    HDL Cholesterol 85 (H) 40 - 60 mg/dL    VLDL Cholesterol Juan A 20 5 - 40 mg/dL    LDL Chol Calc (NIH) 124 (H) 0 - 100 mg/dL   Vitamin B12    Specimen: Blood   Result Value Ref Range    Vitamin B-12 >2000 (H) 211 - 946 pg/mL   Comprehensive Metabolic Panel    Specimen: Blood   Result Value Ref Range    Glucose 130 (H) 65 - 99 mg/dL    BUN 13 8 - 23 mg/dL    Creatinine 1.51 (H) 0.57 - 1.00 mg/dL    eGFR Non African Am 35 (L) >60 mL/min/1.73    eGFR  Am 42 (L) >60 mL/min/1.73    BUN/Creatinine Ratio 8.6 7.0 - 25.0    Sodium 139 136 - 145 mmol/L    Potassium 4.7 3.5 - 5.2 mmol/L    Chloride 104 98 - 107 mmol/L    Total CO2 25.3 22.0 - 29.0 mmol/L    Calcium 9.4 8.6 - 10.5 mg/dL    Total Protein 7.2 6.0 - 8.5 g/dL    Albumin 4.80 3.50 - 5.20 g/dL    Globulin 2.4 gm/dL    A/G Ratio 2.0 g/dL    Total Bilirubin 0.4 0.0 - 1.2 mg/dL    Alkaline Phosphatase 73 39 - 117 U/L    AST (SGOT) 27 1 - 32 U/L    ALT (SGPT) 19 1 - 33 U/L   TSH    Specimen: Blood   Result Value Ref Range    TSH 1.310 0.270 - 4.200 uIU/mL   T4, Free    Specimen: Blood   Result Value Ref Range    Free T4 1.05  0.93 - 1.70 ng/dL   Hemoglobin A1c    Specimen: Blood   Result Value Ref Range    Hemoglobin A1C 6.80 (H) 4.80 - 5.60 %   C-reactive Protein    Specimen: Blood   Result Value Ref Range    C-Reactive Protein 0.24 0.00 - 0.50 mg/dL   [place] Spotted Fever, IgM    Specimen: Blood   Result Value Ref Range    RMSF IgM 0.26 0.00 - 0.89 index   [facility] Spotted Fever, IgG    Specimen: Blood   Result Value Ref Range    RMSF IgG Negative Negative   Lyme Disease, PCR    Specimen: Lumbar Puncture; Cerebrospinal Fluid   Result Value Ref Range    Lyme Disease(B.burgdorferi)PCR Negative Negative   Ehrlichia Antibody Panel    Specimen: Blood   Result Value Ref Range    E. chaffeensis (HME) IgG Titer Negative Neg:<1:64    E. chaffeensis (HME) IgM Titer Negative Neg:<1:20    HGE IgG Titer Negative Neg:<1:64    HGE IgM Titer Negative Neg:<1:20   Cyclic Citrul Peptide Antibody, IgG / IgA    Specimen: Blood   Result Value Ref Range    CCP Antibodies IgG/IgA 7 0 - 19 units   Rheumatoid Factor    Specimen: Blood   Result Value Ref Range    RA Latex Turbid <10.0 0.0 - 13.9 IU/mL   Sedimentation Rate    Specimen: Blood   Result Value Ref Range    Sed Rate 13 0 - 30 mm/hr   Uric Acid    Specimen: Blood   Result Value Ref Range    Uric Acid 5.6 2.4 - 5.7 mg/dL   Antistreptolysin O Titer    Specimen: Blood   Result Value Ref Range    ASO 90.7 0.0 - 200.0 IU/mL   Nuclear Antigen Antibody, IFA    Specimen: Blood   Result Value Ref Range    BRYAN Positive (A)    CK    Specimen: Blood   Result Value Ref Range    Creatine Kinase 231 (H) 20 - 180 U/L   Myoglobin, Serum    Specimen: Blood   Result Value Ref Range    Myoglobin 100.0 (H) 25.0 - 58.0 ng/mL   Vitamin B6    Specimen: Blood   Result Value Ref Range    Vitamin B6 25.0 2.0 - 32.8 ug/L   Zinc    Specimen: Blood   Result Value Ref Range    Zinc 72 56 - 134 ug/dL   Magnesium    Specimen: Blood   Result Value Ref Range    Magnesium 2.3 1.6 - 2.4 mg/dL   Vitamin B1, Whole Blood    Specimen:  Blood   Result Value Ref Range    Vitamin B1, Whole Blood 112.4 66.5 - 200.0 nmol/L   Niacin (Vitamin B3)    Specimen: Blood   Result Value Ref Range    Nicotinamide 14.6 5.2 - 72.1 ng/mL    Nicotinic Acid <5.0 0.0 - 5.0 ng/mL   Vitamin D 25 hydroxy    Specimen: Blood   Result Value Ref Range    25 Hydroxy, Vitamin D 47.6 30.0 - 100.0 ng/ml   Vitamin D 1,25 Dihydroxy    Specimen: Blood   Result Value Ref Range    1,25-Dihydroxy, Vitamin D 60.3 19.9 - 79.3 pg/mL   Phosphorus    Specimen: Blood   Result Value Ref Range    Phosphorus 4.0 2.5 - 4.5 mg/dL   Iron Profile    Specimen: Blood   Result Value Ref Range    TIBC 326 mcg/dL    UIBC 219 112 - 346 mcg/dL    Iron 107 37 - 145 mcg/dL    Iron Saturation 33 20 - 50 %   Protein Elec + Interp, Serum    Specimen: Blood   Result Value Ref Range    Albumin 4.1 2.9 - 4.4 g/dL    Alpha-1-Globulin 0.2 0.0 - 0.4 g/dL    Alpha-2-Globulin 0.8 0.4 - 1.0 g/dL    Beta Globulin 1.0 0.7 - 1.3 g/dL    Gamma Globulin 1.0 0.4 - 1.8 g/dL    M-Isma Not Observed Not Observed g/dL    Globulin 3.1 2.2 - 3.9 g/dL    A/G Ratio 1.3 0.7 - 1.7    Please note Comment     SPE Interpretation Comment    Folate    Specimen: Blood   Result Value Ref Range    Folate >20.00 4.78 - 24.20 ng/mL   CBC & Differential    Specimen: Blood   Result Value Ref Range    WBC 5.06 3.40 - 10.80 10*3/mm3    RBC 4.09 3.77 - 5.28 10*6/mm3    Hemoglobin 13.0 12.0 - 15.9 g/dL    Hematocrit 37.8 34.0 - 46.6 %    MCV 92.4 79.0 - 97.0 fL    MCH 31.8 26.6 - 33.0 pg    MCHC 34.4 31.5 - 35.7 g/dL    RDW 13.0 12.3 - 15.4 %    Platelets 297 140 - 450 10*3/mm3    Neutrophil Rel % 62.8 42.7 - 76.0 %    Lymphocyte Rel % 26.9 19.6 - 45.3 %    Monocyte Rel % 8.1 5.0 - 12.0 %    Eosinophil Rel % 0.8 0.3 - 6.2 %    Basophil Rel % 1.0 0.0 - 1.5 %    Neutrophils Absolute 3.18 1.70 - 7.00 10*3/mm3    Lymphocytes Absolute 1.36 0.70 - 3.10 10*3/mm3    Monocytes Absolute 0.41 0.10 - 0.90 10*3/mm3    Eosinophils Absolute 0.04 0.00 - 0.40  10*3/mm3    Basophils Absolute 0.05 0.00 - 0.20 10*3/mm3    Immature Granulocyte Rel % 0.4 0.0 - 0.5 %    Immature Grans Absolute 0.02 0.00 - 0.05 10*3/mm3    nRBC 0.0 0.0 - 0.2 /100 WBC   RUTH Staining Patterns   Result Value Ref Range    Homogeneous Pattern 1:160 (H)     Speckled Pattern 1:80     Note: (Reference) Comment          Assessment/Plan   muslce enzyems elevated. Weak.,       bp low. Hx of flash pulm edema.   Cri.    Decrease lasix to 80 am to 40 am. And 40 pm if needed.           Keep weening gabapentin.        Diagnoses and all orders for this visit:    1. KALYN (obstructive sleep apnea) (Primary)  -     Ambulatory Referral to Neurology    2. Myositis, unspecified myositis type, unspecified site  -     Anti-Hilda 1 Antibody, IgG  -     CK  -     Myoglobin, Serum    3. Fall, initial encounter    4. Polymyositis (CMS/HCC)  -     Anti-Hilda 1 Antibody, IgG  -     CK  -     Myoglobin, Serum  -     predniSONE (DELTASONE) 20 MG tablet; Take 1 tablet by mouth Daily.  Dispense: 30 tablet; Refill: 1      Return in about 1 week (around 12/15/2020).          There are no Patient Instructions on file for this visit.     Sanjeev Rawls MD    Assessment/Plan

## 2020-12-09 LAB
CK SERPL-CCNC: 226 U/L (ref 20–180)
ENA JO1 AB SER-ACNC: <0.2 AI (ref 0–0.9)
MYOGLOBIN SERPL-MCNC: 175 NG/ML (ref 25–58)

## 2020-12-10 ENCOUNTER — TELEPHONE (OUTPATIENT)
Dept: INTERNAL MEDICINE | Age: 65
End: 2020-12-10

## 2020-12-10 ENCOUNTER — HOSPITAL ENCOUNTER (OUTPATIENT)
Dept: ULTRASOUND IMAGING | Facility: HOSPITAL | Age: 65
Discharge: HOME | End: 2020-12-10
Admitting: INTERNAL MEDICINE

## 2020-12-10 ENCOUNTER — PATIENT MESSAGE (OUTPATIENT)
Dept: INTERNAL MEDICINE | Facility: CLINIC | Age: 65
End: 2020-12-10

## 2020-12-10 PROCEDURE — 76536 US EXAM OF HEAD AND NECK: CPT

## 2020-12-10 RX ORDER — BUPROPION HYDROCHLORIDE 100 MG/1
100 TABLET, EXTENDED RELEASE ORAL DAILY
Qty: 90 TABLET | Refills: 3 | Status: SHIPPED | OUTPATIENT
Start: 2020-12-10 | End: 2021-01-07

## 2020-12-10 NOTE — TELEPHONE ENCOUNTER
From: aRquel Mariee  To: Sanjeev Rawls MD  Sent: 12/10/2020 8:42 AM EST  Subject: Prescription Question    Please refill my bupropion 10 mg. Bid at the [facility]. Thank you Lilian [phone]

## 2020-12-15 ENCOUNTER — OFFICE VISIT (OUTPATIENT)
Dept: INTERNAL MEDICINE | Facility: CLINIC | Age: 65
End: 2020-12-15

## 2020-12-15 VITALS
BODY MASS INDEX: 42.49 KG/M2 | WEIGHT: 255 LBS | OXYGEN SATURATION: 97 % | TEMPERATURE: 98 F | HEART RATE: 97 BPM | SYSTOLIC BLOOD PRESSURE: 100 MMHG | DIASTOLIC BLOOD PRESSURE: 60 MMHG | HEIGHT: 65 IN | RESPIRATION RATE: 16 BRPM

## 2020-12-15 DIAGNOSIS — M33.20 POLYMYOSITIS (HCC): Primary | ICD-10-CM

## 2020-12-15 DIAGNOSIS — Z79.899 POLYPHARMACY: ICD-10-CM

## 2020-12-15 DIAGNOSIS — E11.9 TYPE 2 DIABETES MELLITUS WITHOUT COMPLICATION, WITHOUT LONG-TERM CURRENT USE OF INSULIN (HCC): ICD-10-CM

## 2020-12-15 PROCEDURE — 99214 OFFICE O/P EST MOD 30 MIN: CPT | Performed by: INTERNAL MEDICINE

## 2020-12-15 NOTE — PROGRESS NOTES
Subjective     Patient ID: Raquel Mariee is a 65 y.o. female. Patient is here for management of multiple medical problems.     Chief Complaint   Patient presents with   • Sleep Apnea   • Back Pain     History of Present Illness     Still with pain on right back from the day of the fall      Thinks she has a sinus infection now.   Yellow green sputum.  X 3 days. Getting worse. No otc.    prednisone is working. muscle not sore now.          The following portions of the patient's history were reviewed and updated as appropriate: allergies, current medications, past family history, past medical history, past social history, past surgical history and problem list.    Review of Systems   Constitutional: Negative for diaphoresis, fatigue and fever.   Musculoskeletal: Positive for arthralgias.   Psychiatric/Behavioral: Negative for self-injury and sleep disturbance. The patient is not nervous/anxious and is not hyperactive.        Current Outpatient Medications:   •  amitriptyline (ELAVIL) 10 MG tablet, Take 10 mg by mouth 2 (Two) Times a Day., Disp: , Rfl:   •  buPROPion SR (WELLBUTRIN SR) 100 MG 12 hr tablet, Take 1 tablet by mouth Daily., Disp: 90 tablet, Rfl: 3  •  calcium carbonate (Antacid Maximum) 1000 MG chewable tablet, Chew 1,000 mg 3 (Three) Times a Day., Disp: , Rfl:   •  CBD (cannabidiol) oral oil, Take  by mouth 2 (Two) Times a Day., Disp: , Rfl:   •  cholecalciferol (VITAMIN D3) 25 MCG (1000 UT) tablet, Take 1,000 Units by mouth Daily., Disp: , Rfl:   •  clonazePAM (KlonoPIN) 0.5 MG tablet, Take 0.5 mg by mouth At Night As Needed., Disp: , Rfl:   •  Dextromethorphan-guaiFENesin (Mucus-DM)  MG tablet sustained-release 12 hour, Take  by mouth., Disp: , Rfl:   •  dicyclomine (BENTYL) 10 MG capsule, Take 10 mg by mouth 3 (Three) Times a Day Before Meals., Disp: , Rfl:   •  famotidine (PEPCID) 20 MG tablet, Take 20 mg by mouth 2 (Two) Times a Day., Disp: , Rfl:   •  ferrous sulfate 325 (65 Fe) MG tablet,  Take  by mouth., Disp: , Rfl:   •  fexofenadine (ALLEGRA) 180 MG tablet, Take 180 mg by mouth Daily., Disp: , Rfl:   •  fluticasone (FLOVENT DISKUS) 50 MCG/BLIST diskus inhaler, Inhale 1 puff 2 (Two) Times a Day., Disp: , Rfl:   •  fluticasone-salmeterol (ADVAIR) 500-50 MCG/DOSE DISKUS, Inhale 1 puff., Disp: , Rfl:   •  furosemide (LASIX) 40 MG tablet, Take 40 mg by mouth 2 (Two) Times a Day., Disp: , Rfl:   •  gabapentin (NEURONTIN) 100 MG capsule, Take 100 mg by mouth Daily., Disp: , Rfl:   •  glucose-vitamin C ([employer] Glucose) 4-6 GM-MG chewable tablet chewable tablet, Chew., Disp: , Rfl:   •  guaiFENesin-codeine (Virtussin A/C) 100-10 MG/5ML liquid, Take 5 mL by mouth 3 (Three) Times a Day As Needed for Cough., Disp: , Rfl:   •  hydrOXYzine pamoate (VISTARIL) 50 MG capsule, Take 50 mg by mouth Every Night., Disp: , Rfl:   •  isosorbide dinitrate (ISORDIL) 10 MG tablet, Take 10 mg by mouth 3 (Three) Times a Day., Disp: , Rfl:   •  levothyroxine (SYNTHROID, LEVOTHROID) 50 MCG tablet, Take 50 mcg by mouth Daily., Disp: , Rfl:   •  Magnesium 250 MG tablet, Take  by mouth., Disp: , Rfl:   •  melatonin 5 MG tablet tablet, Take 5 mg by mouth Every Night., Disp: , Rfl:   •  mepolizumab (NUCALA) 100 MG reconstituted solution injection, Inject 100 mg under the skin into the appropriate area as directed Every 30 (Thirty) Days., Disp: , Rfl:   •  montelukast (SINGULAIR) 10 MG tablet, Take 10 mg by mouth Every Night., Disp: , Rfl:   •  multivitamin with minerals (ONE-A-DAY PROACTIVE 65+ PO), Take 1 tablet by mouth Daily., Disp: , Rfl:   •  omeprazole (priLOSEC) 40 MG capsule, Take 40 mg by mouth 2 (two) times a day., Disp: , Rfl:   •  ondansetron (ZOFRAN) 4 MG tablet, Take 4 mg by mouth Every 8 (Eight) Hours As Needed for Nausea or Vomiting., Disp: , Rfl:   •  potassium chloride 10 MEQ CR tablet, Take 10 mEq by mouth 2 (Two) Times a Day., Disp: , Rfl:   •  potassium citrate (UROCIT-K) 10 MEQ (1080 MG) CR tablet, Take 10 mEq  "by mouth Daily., Disp: , Rfl:   •  prazosin (MINIPRESS) 2 MG capsule, Take 2 mg by mouth Every Night., Disp: , Rfl:   •  predniSONE (DELTASONE) 20 MG tablet, Take 20 mg by mouth 2 (Two) Times a Day., Disp: , Rfl:   •  predniSONE (DELTASONE) 20 MG tablet, Take 1 tablet by mouth Daily., Disp: 30 tablet, Rfl: 1  •  tiotropium (SPIRIVA) 18 MCG per inhalation capsule, Place 1 capsule into inhaler and inhale Daily., Disp: , Rfl:   •  TiZANidine (ZANAFLEX) 2 MG capsule, Take 2 mg by mouth Every Night., Disp: , Rfl:   •  ubrogepant (ubrogepant) 100 MG tablet, Take 1 tablet by mouth once per day as needed at onset of migraine, Disp: 10 tablet, Rfl: 11  •  valsartan (DIOVAN) 160 MG tablet, Take 160 mg by mouth Daily., Disp: , Rfl:   •  vitamin B-12 (CYANOCOBALAMIN) 1000 MCG tablet, Take 1,000 mcg by mouth 2 (two) times a day., Disp: , Rfl:     Objective      Blood pressure 100/60, pulse 97, temperature 98 °F (36.7 °C), resp. rate 16, height 165.1 cm (65\"), weight 116 kg (255 lb), SpO2 97 %.    Physical Exam     General Appearance:    Alert, cooperative, no distress, appears stated age   Head:    Normocephalic, without obvious abnormality, atraumatic   Eyes:    PERRL, conjunctiva/corneas clear, EOM's intact   Ears:    Normal TM's and external ear canals, both ears   Nose:   Nares normal, septum midline, mucosa normal, no drainage   or sinus tenderness   Throat:   Lips, mucosa, and tongue normal; teeth and gums normal   Neck:   Supple, symmetrical, trachea midline, no adenopathy;        thyroid:  No enlargement/tenderness/nodules; no carotid    bruit or JVD   Back:     Symmetric, no curvature, ROM normal, no CVA tenderness   Lungs:     Clear to auscultation bilaterally, respirations unlabored   Chest wall:    No tenderness or deformity   Heart:    Regular rate and rhythm, S1 and S2 normal, no murmur,        rub or gallop   Abdomen:     Soft, non-tender, bowel sounds active all four quadrants,     no masses, no organomegaly "   Extremities:  no edema today.     Extremities normal, atraumatic, no cyanosis or edema   Pulses:   2+ and symmetric all extremities   Skin:   Skin color, texture, turgor normal, no rashes or lesions   Lymph nodes:   Cervical, supraclavicular, and axillary nodes normal   Neurologic:   CNII-XII intact. Normal strength, sensation and reflexes       throughout      Results for orders placed or performed in visit on 12/08/20   Anti-Jordi 1 Antibody, IgG    Specimen: Blood   Result Value Ref Range    JORDI-1 IgG <0.2 0.0 - 0.9 AI   CK    Specimen: Blood   Result Value Ref Range    Creatine Kinase 226 (H) 20 - 180 U/L   Myoglobin, Serum    Specimen: Blood   Result Value Ref Range    Myoglobin 175.0 (H) 25.0 - 58.0 ng/mL         Assessment/Plan   Muscle pain better on prednisone.        Thinks she has a sinus infection now.   Yellow green sputum.    Gabapentin weening well.   On 100 mg bid. Decrease to qd in one week then off in one week if able.    dcrease klonapin 0.5 at night. Will decrease to 0.25.    ubrevy worked well for HA   Pt on no narcotics for HA now.        Diagnoses and all orders for this visit:    1. Polymyositis (CMS/[facility]) (Primary)  -     Comprehensive Metabolic Panel  -     CK  -     Myoglobin, Serum    2. Type 2 diabetes mellitus without complication, without long-term current use of insulin (CMS/[facility])  -     Ambulatory Referral to Ophthalmology  -     Comprehensive Metabolic Panel  -     CK  -     Myoglobin, Serum    3. Polypharmacy  -     Comprehensive Metabolic Panel  -     CK  -     Myoglobin, Serum      No follow-ups on file.          There are no Patient Instructions on file for this visit.     Sanjeev Rawls MD    Assessment/Plan

## 2020-12-24 RX ORDER — LEVOTHYROXINE SODIUM 0.05 MG/1
50 TABLET ORAL DAILY
OUTPATIENT
Start: 2020-12-24

## 2020-12-24 RX ORDER — AMITRIPTYLINE HYDROCHLORIDE 10 MG/1
10 TABLET, FILM COATED ORAL 2 TIMES DAILY
OUTPATIENT
Start: 2020-12-24

## 2020-12-24 NOTE — TELEPHONE ENCOUNTER
Caller: Raquel Mariee    Relationship: Self    Best call back number: [phone]    Medication needed:   Requested Prescriptions     Pending Prescriptions Disp Refills   • levothyroxine (SYNTHROID, LEVOTHROID) 50 MCG tablet        Sig: Take 1 tablet by mouth Daily.   • amitriptyline (ELAVIL) 10 MG tablet        Sig: Take 1 tablet by mouth 2 (Two) Times a Day.       What details did the patient provide when requesting the medication: PATIENT OUT OF MEDICATION    Does the patient have less than a 3 day supply:  [x] Yes  [] No    What is the patient's preferred pharmacy: [facility]

## 2020-12-24 NOTE — TELEPHONE ENCOUNTER
Spoke with pt and advised she has refills at the pharmacy. She needs to call [facility] pharmacy to have these filled. Pt understands.

## 2020-12-28 RX ORDER — FLUTICASONE PROPIONATE AND SALMETEROL XINAFOATE 230; 21 UG/1; UG/1
AEROSOL, METERED RESPIRATORY (INHALATION)
Qty: 12 G | Refills: 11 | Status: SHIPPED | OUTPATIENT
Start: 2020-12-28 | End: 2021-12-30 | Stop reason: SDUPTHER

## 2020-12-28 RX ORDER — FLUTICASONE PROPIONATE AND SALMETEROL XINAFOATE 230; 21 UG/1; UG/1
2 AEROSOL, METERED RESPIRATORY (INHALATION)
Qty: 12 G | Refills: 11 | Status: CANCELLED | OUTPATIENT
Start: 2020-12-28

## 2020-12-30 DIAGNOSIS — Z95.828 PORT-A-CATH IN PLACE: Primary | ICD-10-CM

## 2020-12-30 RX ORDER — SODIUM CHLORIDE 0.9 % (FLUSH) 0.9 %
20 SYRINGE (ML) INJECTION AS NEEDED
Status: ACTIVE | OUTPATIENT
Start: 2020-12-30

## 2020-12-30 RX ORDER — SODIUM CHLORIDE 0.9 % (FLUSH) 0.9 %
10 SYRINGE (ML) INJECTION EVERY 12 HOURS SCHEDULED
Status: SHIPPED | OUTPATIENT
Start: 2020-12-30

## 2020-12-30 RX ORDER — SODIUM CHLORIDE 0.9 % (FLUSH) 0.9 %
10 SYRINGE (ML) INJECTION AS NEEDED
Status: SHIPPED | OUTPATIENT
Start: 2020-12-30

## 2020-12-30 RX ORDER — HEPARIN SODIUM (PORCINE) LOCK FLUSH IV SOLN 100 UNIT/ML 100 UNIT/ML
5 SOLUTION INTRAVENOUS AS NEEDED
Status: SHIPPED | OUTPATIENT
Start: 2020-12-30

## 2020-12-31 DIAGNOSIS — F41.9 ANXIETY: Primary | ICD-10-CM

## 2020-12-31 RX ORDER — CLONAZEPAM 0.5 MG/1
0.5 TABLET ORAL NIGHTLY PRN
Qty: 30 TABLET | Refills: 1 | Status: SHIPPED | OUTPATIENT
Start: 2020-12-31 | End: 2021-04-13 | Stop reason: SDUPTHER

## 2020-12-31 RX ORDER — TIZANIDINE 2 MG/1
2 TABLET ORAL NIGHTLY
Qty: 90 TABLET | Refills: 1 | Status: SHIPPED | OUTPATIENT
Start: 2020-12-31 | End: 2021-05-26

## 2021-01-04 ENCOUNTER — HOSPITAL ENCOUNTER (OUTPATIENT)
Dept: INFUSION THERAPY | Facility: HOSPITAL | Age: 66
Setting detail: INFUSION SERIES
Discharge: HOME | End: 2021-01-04

## 2021-01-04 VITALS
OXYGEN SATURATION: 95 % | TEMPERATURE: 98.4 F | HEIGHT: 65 IN | WEIGHT: 250 LBS | SYSTOLIC BLOOD PRESSURE: 119 MMHG | HEART RATE: 87 BPM | BODY MASS INDEX: 41.65 KG/M2 | RESPIRATION RATE: 18 BRPM | DIASTOLIC BLOOD PRESSURE: 82 MMHG

## 2021-01-04 DIAGNOSIS — Z95.828 PORT-A-CATH IN PLACE: Primary | ICD-10-CM

## 2021-01-04 PROCEDURE — 96523 IRRIG DRUG DELIVERY DEVICE: CPT

## 2021-01-04 PROCEDURE — 25010000003 HEPARIN LOCK FLUSH PER 10 UNITS: Performed by: INTERNAL MEDICINE

## 2021-01-04 RX ORDER — HEPARIN SODIUM (PORCINE) LOCK FLUSH IV SOLN 100 UNIT/ML 100 UNIT/ML
500 SOLUTION INTRAVENOUS AS NEEDED
Status: DISCONTINUED | OUTPATIENT
Start: 2021-01-04 | End: 2021-01-06 | Stop reason: HOSPADM

## 2021-01-04 RX ORDER — SODIUM CHLORIDE 0.9 % (FLUSH) 0.9 %
10 SYRINGE (ML) INJECTION AS NEEDED
Status: CANCELLED | OUTPATIENT
Start: 2021-01-29

## 2021-01-04 RX ORDER — HEPARIN SODIUM (PORCINE) LOCK FLUSH IV SOLN 100 UNIT/ML 100 UNIT/ML
500 SOLUTION INTRAVENOUS AS NEEDED
Status: CANCELLED | OUTPATIENT
Start: 2021-01-29

## 2021-01-04 RX ORDER — SODIUM CHLORIDE 0.9 % (FLUSH) 0.9 %
10 SYRINGE (ML) INJECTION AS NEEDED
Status: DISCONTINUED | OUTPATIENT
Start: 2021-01-04 | End: 2021-01-06 | Stop reason: HOSPADM

## 2021-01-04 RX ORDER — HEPARIN SODIUM (PORCINE) LOCK FLUSH IV SOLN 100 UNIT/ML 100 UNIT/ML
500 SOLUTION INTRAVENOUS AS NEEDED
Status: CANCELLED | OUTPATIENT
Start: 2021-01-04

## 2021-01-04 RX ORDER — SODIUM CHLORIDE 0.9 % (FLUSH) 0.9 %
10 SYRINGE (ML) INJECTION AS NEEDED
Status: CANCELLED | OUTPATIENT
Start: 2021-01-04

## 2021-01-04 RX ADMIN — SODIUM CHLORIDE, PRESERVATIVE FREE 10 ML: 5 INJECTION INTRAVENOUS at 13:14

## 2021-01-04 RX ADMIN — HEPARIN 500 UNITS: 100 SYRINGE at 13:14

## 2021-01-07 ENCOUNTER — OFFICE VISIT (OUTPATIENT)
Dept: PSYCHIATRY | Facility: CLINIC | Age: 66
End: 2021-01-07

## 2021-01-07 VITALS — WEIGHT: 250 LBS | HEIGHT: 65 IN | BODY MASS INDEX: 41.65 KG/M2

## 2021-01-07 DIAGNOSIS — F41.1 GAD (GENERALIZED ANXIETY DISORDER): ICD-10-CM

## 2021-01-07 DIAGNOSIS — F43.10 POST TRAUMATIC STRESS DISORDER (PTSD): Chronic | ICD-10-CM

## 2021-01-07 DIAGNOSIS — F32.0 CURRENT MILD EPISODE OF MAJOR DEPRESSIVE DISORDER, UNSPECIFIED WHETHER RECURRENT (HCC): Primary | Chronic | ICD-10-CM

## 2021-01-07 PROCEDURE — 99214 OFFICE O/P EST MOD 30 MIN: CPT | Performed by: NURSE PRACTITIONER

## 2021-01-07 RX ORDER — BUPROPION HYDROCHLORIDE 150 MG/1
150 TABLET, EXTENDED RELEASE ORAL 2 TIMES DAILY
Qty: 60 TABLET | Refills: 0 | Status: SHIPPED | OUTPATIENT
Start: 2021-01-07 | End: 2021-01-15 | Stop reason: SDUPTHER

## 2021-01-07 NOTE — PROGRESS NOTES
"Chief Complaint  Anxiety and insomnia  Subjective          Raquel Mariee presents to [facility] by herself for a follow up and medication check.    History of Present Illness: Raquel is a 65-year-old  female who presents by herself for a follow-up and medication check.  Raquel states \"I have not been feeling good.\"  Raquel tells me she had all of her teeth pulled on New Year's Dacia and is wearing a temporary denture.  She tells me that temporary denture has rubbed \"a spot\" on the back of her upper, right jaw which is causing significant pain.  Raquel tells me she will return to her oral surgeon's office today to be evaluated.  She is currently using Peridex mouth rinse and salt/soda rinses.  Raquel takes many medications and is working with her primary care physician to decrease her polypharmacy.  She is currently weaning from gabapentin and clonazepam.  Raquel is endorsing more depressive symptoms today.  She believes it is related to the pain she is having in her mouth but also attributes it to the colder and darker days.  Raquel tells me she currently uses light therapy to improve her seasonal affective disorder.  She is resistant to adding any medications as she is currently working closely with her primary care to decrease her polypharmacy; however, we discussed increasing Wellbutrin for a short time.  Raquel rates her depression 6 out of 10 most days over the last two weeks with 10 being the most severe.  She tells me she has lost 20 pounds since her move from [place] due to stress and pain.  Raquel was also having frequent falls and has stopped her prazosin due to low blood pressure.  Her current medication regimen includes Wellbutrin, Klonopin, Vistaril, and melatonin.  She denies any side effects of her current medication regiment.  She denies any problems with sleep.  She denies SI/HI/AVH.    Current Medications:   Current " Outpatient Medications   Medication Sig Dispense Refill   • albuterol sulfate  (90 Base) MCG/ACT inhaler Inhale 2 puffs by mouth every 4-6 hours as needed for cough, wheeze, shortness of breath. Use with vortex spacer 18 g 3   • amitriptyline (ELAVIL) 10 MG tablet Take 10 mg by mouth 2 (Two) Times a Day.     • amitriptyline (ELAVIL) 10 MG tablet Take 2 tablets by mouth Every Evening. 90 tablet 3   • Azelastine HCl 137 MCG/SPRAY solution Instill 1-2 sprays in each nostril twice a day as needed 30 mL 11   • buPROPion SR (WELLBUTRIN SR) 150 MG 12 hr tablet Take 1 tablet by mouth 2 (two) times a day. 60 tablet 0   • calcium carbonate (Antacid Maximum) 1000 MG chewable tablet Chew 500 mg 3 (Three) Times a Day.     • CBD (cannabidiol) oral oil Take  by mouth 2 (Two) Times a Day.     • chlorhexidine (PERIDEX) 0.12 % solution Swish 15 mL around mouth for 30 seconds, then spit out. Use twice daily as directed.  473 mL 0   • cholecalciferol (VITAMIN D3) 25 MCG (1000 UT) tablet Take 1,000 Units by mouth Daily.     • clonazePAM (KlonoPIN) 0.5 MG tablet Take 1 tablet by mouth At Night As Needed 30 tablet 1   • Dextromethorphan-guaiFENesin (Mucus-DM)  MG tablet sustained-release 12 hour Take  by mouth.     • dicyclomine (BENTYL) 10 MG capsule Take 10 mg by mouth Daily.     • diphenhydrAMINE (Benadryl Allergy) 25 MG tablet Take 1-2 tablets by mouth Every 4-6 Hours As Needed. 90 tablet 11   • EPINEPHrine (EPIPEN) 0.3 MG/0.3ML solution auto-injector injection Use as directed for acute allergic reaction 2 each 3   • famotidine (PEPCID) 20 MG tablet Take 20 mg by mouth 2 (Two) Times a Day.     • ferrous sulfate 325 (65 Fe) MG tablet Take  by mouth.     • fexofenadine (ALLEGRA) 180 MG tablet Take 180 mg by mouth Daily.     • fluticasone (FLONASE) 50 MCG/ACT nasal spray Instill 2 sprays into each nostril daily. 16 g 11   • fluticasone-salmeterol (Advair HFA) 230-21 MCG/ACT inhaler Inhale 2 puffs by mouth 2 (Two) Times a  Day. 12 g 11   • furosemide (LASIX) 40 MG tablet Take 40 mg by mouth 2 (Two) Times a Day.     • gabapentin (NEURONTIN) 100 MG capsule Take 100 mg by mouth Daily.     • glucose-vitamin C ([employer] Glucose) 4-6 GM-MG chewable tablet chewable tablet Chew.     • guaiFENesin-codeine (Virtussin A/C) 100-10 MG/5ML liquid Take 5 mL by mouth 3 (Three) Times a Day As Needed for Cough.     • hydrOXYzine pamoate (VISTARIL) 50 MG capsule Take 50 mg by mouth Every Night.     • ipratropium-albuterol (DUO-NEB) 0.5-2.5 mg/3 ml nebulizer Inhale contents of 1 vial (3mL) via nebulizer every 4-6 hours as needed for coughing, wheezing, or shortness of breath. 300 mL 3   • isosorbide dinitrate (ISORDIL) 10 MG tablet Take 10 mg by mouth 3 (Three) Times a Day.     • levothyroxine (SYNTHROID, LEVOTHROID) 50 MCG tablet Take 1 tablet by mouth Daily. 90 tablet 3   • Magnesium 250 MG tablet Take 500 mg by mouth Daily. Two tablets     • melatonin 5 MG tablet tablet Take 5 mg by mouth Every Night.     • mepolizumab (NUCALA) 100 MG reconstituted solution injection Inject 100 mg under the skin into the appropriate area as directed Every 30 (Thirty) Days.     • montelukast (SINGULAIR) 10 MG tablet Take 1 tablet by mouth every night at bedtime. 30 tablet 11   • multivitamin with minerals (ONE-A-DAY PROACTIVE 65+ PO) Take 1 tablet by mouth Daily.     • omeprazole (priLOSEC) 40 MG capsule Take 40 mg by mouth 2 (two) times a day.     • ondansetron (ZOFRAN) 4 MG tablet Take 4 mg by mouth Every 8 (Eight) Hours As Needed for Nausea or Vomiting.     • potassium chloride 10 MEQ CR tablet Take 10 mEq by mouth 2 (Two) Times a Day.     • predniSONE (DELTASONE) 20 MG tablet Take 1 tablet by mouth Daily. 30 tablet 1   • Spacer/Aero-Holding Chambers (AeroChamber Plus Wali-Vu) misc Use as directed with albuterol inhaler 1 each 0   • tiotropium bromide monohydrate (SPIRIVA RESPIMAT) 2.5 MCG/ACT aerosol solution inhaler Inhale 2 puffs by mouth daily 4 g 11   • tiZANidine  "(ZANAFLEX) 2 MG tablet Take 1 tablet by mouth Every Night. 90 tablet 1   • ubrogepant (ubrogepant) 100 MG tablet Take 1 tablet by mouth once per day as needed at onset of migraine 10 tablet 11   • valsartan (DIOVAN) 160 MG tablet Take 160 mg by mouth Daily.     • vitamin B-12 (CYANOCOBALAMIN) 1000 MCG tablet Take 1,000 mcg by mouth 2 (two) times a day.     • acetaminophen-codeine (TYLENOL #3) 300-30 MG per tablet Take 1 tablet by mouth every 4-6 hours as needed for pain. 15 tablet 0   • azithromycin (ZITHROMAX) 250 MG tablet Take 2 tablets by mouth on day 1, then take 1 tablet daily for 4 days 6 tablet 0   • fluticasone (FLOVENT DISKUS) 50 MCG/BLIST diskus inhaler Inhale 1 puff 2 (Two) Times a Day.     • fluticasone-salmeterol (ADVAIR) 500-50 MCG/DOSE DISKUS Inhale 1 puff.     • levothyroxine (SYNTHROID, LEVOTHROID) 50 MCG tablet Take 50 mcg by mouth Daily.     • montelukast (SINGULAIR) 10 MG tablet Take 10 mg by mouth Every Night.     • potassium citrate (UROCIT-K) 10 MEQ (1080 MG) CR tablet Take 10 mEq by mouth Daily.     • prazosin (MINIPRESS) 2 MG capsule Take 2 mg by mouth Every Night.     • predniSONE (DELTASONE) 20 MG tablet Take 20 mg by mouth 2 (Two) Times a Day.     • tiotropium (SPIRIVA) 18 MCG per inhalation capsule Place 1 capsule into inhaler and inhale Daily.       Current Facility-Administered Medications   Medication Dose Route Frequency Provider Last Rate Last Admin   • heparin injection 500 Units  5 mL Intravenous PRN Sanjeev Rawls MD       • sodium chloride 0.9 % flush 10 mL  10 mL Intravenous Q12H Sanjeev Rawls MD       • sodium chloride 0.9 % flush 10 mL  10 mL Intravenous PRN Sanjeev Rawls MD       • sodium chloride 0.9 % flush 20 mL  20 mL Intravenous PRN Sanjeev Rawls MD             Objective   Vital Signs:   Ht 165.1 cm (65\")   Wt 113 kg (250 lb)   BMI 41.60 kg/m²     Physical Exam   Result Review :       CMP    CMP 6/5/20 6/6/20 12/1/20 12/1/20      1203 1203   Glucose " 112 (A) 99 130 (A)    BUN 19 23 (A) 13    Creatinine 1.37 (A) 1.72 (A) 1.51 (A)    eGFR Non  Am   35 (A)    eGFR  Am   42 (A)    Sodium 131 (A) 131 (A) 139    Potassium 4.0 4.3 4.7    Chloride 94 (A) 94 (A) 104    Calcium 8.5 8.7 9.4    Total Protein   7.2    Albumin   4.80 4.1   Globulin   2.4 3.1   Total Bilirubin   0.4    Alkaline Phosphatase   73    AST (SGOT)   27    ALT (SGPT)   19    (A) Abnormal value            CBC    CBC 12/1/20   WBC 5.06   RBC 4.09   Hemoglobin 13.0   Hematocrit 37.8   MCV 92.4   MCH 31.8   MCHC 34.4   RDW 13.0   Platelets 297           Lipid Panel    Lipid Panel 12/1/20   Total Cholesterol 229 (A)   Triglycerides 115   HDL Cholesterol 85 (A)   VLDL Cholesterol 20   LDL Cholesterol  124 (A)   (A) Abnormal value            TSH    TSH 12/1/20   TSH 1.310           HgB    HGB 12/1/20   Hemoglobin 13.0           Data reviewed: Dr. Rawls's notes on 12/8/20 and 12/15/20          Assessment and Plan    Problem List Items Addressed This Visit        Other    Major depressive disorder in partial remission (CMS/HCC) - Primary    Relevant Medications    buPROPion SR (WELLBUTRIN SR) 150 MG 12 hr tablet      Other Visit Diagnoses     EMERY (generalized anxiety disorder)        Relevant Medications    buPROPion SR (WELLBUTRIN SR) 150 MG 12 hr tablet    Post traumatic stress disorder (PTSD)  (Chronic)       Relevant Medications    buPROPion SR (WELLBUTRIN SR) 150 MG 12 hr tablet          Mental Status Exam:   Hygiene:   fair  Cooperation:  Cooperative  Eye Contact:  Good  Psychomotor Behavior:  Slow  Affect:  Restricted  Mood: depressed  Speech:  Minimal  Thought Process:  Goal directed and Linear  Thought Content:  Normal  Suicidal:  None  Homicidal:  None  Hallucinations:  None  Delusion:  None  Memory:  Intact  Orientation:  Person, Place, Time and Situation  Reliability:  good  Insight:  Good  Judgement:  Good  Impulse Control:  Good  Physical/Medical Issues:  Yes Tooth extraction,  sinus infection, chronic pain, and HTN     Impression/Plan:  -This is a follow up and medication check.  Raquel is endorsing worsening symptoms of depression.  She has had all of her teeth extracted and is in significant pain.  She is awaiting permanent dentures and her temporary dentures are causing problems.  She is being medically managed by Dr. Rawls and is in the process of weaning from her gabapentin and clonazepam.  She is resistant to making medication changes but realizes her depressive symptoms are worsening and may possibly continue to increase due to the winter months and darker days.  -Stop prazosin due to episodes of low blood pressure.  -Increase Wellbutrin SR to 150 mg twice daily for depression.  -Continue clonazepam, Vistaril, and melatonin as prescribed by primary care physician.  -The BANG report, reviewed through PDMP, of the past 12 months were reviewed and is appropriate.  The patient/guardian reports taking the medication only as prescribed.  The patient/guardian denies any abuse or misuse of the medication.  The patient/guardian denies any other substance use or issues.  There are no apparent substance related issues.  The patient reports no side effects of the current medication usage.  The patient/guardian has reported significant improvement with medication usage and wishes to continue medication as prescribed.  The patient/guardian is appropriate to continue with current medication usage at this time.  Reinforced risks and side effects of medication usage, patient and/or guardian verbalize understanding in their own words and are in agreement with current plan.      MEDS ORDERED DURING VISIT:  New Medications Ordered This Visit   Medications   • buPROPion SR (WELLBUTRIN SR) 150 MG 12 hr tablet     Sig: Take 1 tablet by mouth 2 (two) times a day.     Dispense:  60 tablet     Refill:  0         Follow Up   Return in about 2 months (around 3/7/2021) for Medication Check.  Patient was  given instructions and counseling regarding her condition or for health maintenance advice. Please see specific information pulled into the AVS if appropriate.       TREATMENT PLAN/GOALS: Continue supportive psychotherapy efforts and medications as indicated. Treatment and medication options discussed during today's visit. Patient acknowledged and verbally consented to continue with current treatment plan and was educated on the importance of compliance with treatment and follow-up appointments.    MEDICATION ISSUES:  Discussed medication options and treatment plan of prescribed medication as well as the risks, benefits, and side effects including potential falls, possible impaired driving and metabolic adversities among others. Patient is agreeable to call the office with any worsening of symptoms or onset of side effects. Patient is agreeable to call 911 or go to the nearest ER should he/she begin having SI/HI.        This document has been electronically signed by LILIBETH Hemphill, PMHNP-BC  January 7, 2021 10:41 EST    Part of this note may be an electronic transcription/translation of spoken language to printed text using the Dragon Dictation System.

## 2021-01-15 ENCOUNTER — TELEPHONE (OUTPATIENT)
Dept: INTERNAL MEDICINE | Facility: CLINIC | Age: 66
End: 2021-01-15

## 2021-01-15 ENCOUNTER — OFFICE VISIT (OUTPATIENT)
Dept: INTERNAL MEDICINE | Facility: CLINIC | Age: 66
End: 2021-01-15

## 2021-01-15 VITALS
OXYGEN SATURATION: 97 % | RESPIRATION RATE: 16 BRPM | DIASTOLIC BLOOD PRESSURE: 82 MMHG | WEIGHT: 253 LBS | BODY MASS INDEX: 42.15 KG/M2 | SYSTOLIC BLOOD PRESSURE: 122 MMHG | HEART RATE: 89 BPM | TEMPERATURE: 98.4 F | HEIGHT: 65 IN

## 2021-01-15 DIAGNOSIS — F32.0 CURRENT MILD EPISODE OF MAJOR DEPRESSIVE DISORDER, UNSPECIFIED WHETHER RECURRENT (HCC): Chronic | ICD-10-CM

## 2021-01-15 DIAGNOSIS — R05.9 COUGH: Primary | ICD-10-CM

## 2021-01-15 DIAGNOSIS — E79.0 HYPERURICEMIA: ICD-10-CM

## 2021-01-15 DIAGNOSIS — M33.20 POLYMYOSITIS (HCC): ICD-10-CM

## 2021-01-15 PROCEDURE — 99214 OFFICE O/P EST MOD 30 MIN: CPT | Performed by: INTERNAL MEDICINE

## 2021-01-15 RX ORDER — AZITHROMYCIN 250 MG/1
TABLET, FILM COATED ORAL SEE ADMIN INSTRUCTIONS
Qty: 6 TABLET | Refills: 0 | Status: SHIPPED | OUTPATIENT
Start: 2021-01-15 | End: 2021-01-15

## 2021-01-15 RX ORDER — ALLOPURINOL 100 MG/1
100 TABLET ORAL DAILY
Qty: 90 TABLET | Refills: 3 | Status: SHIPPED | OUTPATIENT
Start: 2021-01-15 | End: 2021-02-12 | Stop reason: SDUPTHER

## 2021-01-15 RX ORDER — PREDNISONE 20 MG/1
20 TABLET ORAL DAILY
Qty: 30 TABLET | Refills: 1 | Status: SHIPPED | OUTPATIENT
Start: 2021-01-15 | End: 2021-01-15 | Stop reason: DRUGHIGH

## 2021-01-15 RX ORDER — DEXTROMETHORPHAN HYDROBROMIDE AND PROMETHAZINE HYDROCHLORIDE 15; 6.25 MG/5ML; MG/5ML
5 SYRUP ORAL 4 TIMES DAILY PRN
Qty: 240 ML | Refills: 0 | Status: SHIPPED | OUTPATIENT
Start: 2021-01-15 | End: 2022-04-24

## 2021-01-15 RX ORDER — PREDNISONE 10 MG/1
10 TABLET ORAL DAILY
Qty: 30 TABLET | Refills: 5 | Status: SHIPPED | OUTPATIENT
Start: 2021-01-15 | End: 2021-02-12 | Stop reason: SDUPTHER

## 2021-01-15 RX ORDER — AZITHROMYCIN 250 MG/1
TABLET, FILM COATED ORAL
Qty: 12 TABLET | Refills: 0 | Status: SHIPPED | OUTPATIENT
Start: 2021-01-15 | End: 2021-04-26

## 2021-01-15 RX ORDER — VALSARTAN 160 MG/1
160 TABLET ORAL DAILY
Qty: 90 TABLET | Refills: 3 | Status: SHIPPED | OUTPATIENT
Start: 2021-01-15 | End: 2022-02-01 | Stop reason: SDUPTHER

## 2021-01-15 RX ORDER — GUAIFENESIN AND CODEINE PHOSPHATE 100; 10 MG/5ML; MG/5ML
5 SOLUTION ORAL 3 TIMES DAILY PRN
Qty: 118 ML | Refills: 1 | Status: CANCELLED | OUTPATIENT
Start: 2021-01-15

## 2021-01-15 NOTE — PROGRESS NOTES
Subjective     Patient ID: Raquel Mariee is a 65 y.o. female. Patient is here for management of multiple medical problems.     Chief Complaint   Patient presents with   • Sleep Apnea     History of Present Illness         Gabapentin down to 100mg qhs.  Toes hurt now.     Dicyclomine.  Decreased and didn't do well.     pred decreased 10mg ok so far.    Welbutin. Increase to 150mg.    Clonazepam decrease and didn't sleep. Still needs barry treated.  eval next week.      Prazosin stopped and no night vazquez.  occ dizziness but much improved.     gufenosien with codeine.   .     The following portions of the patient's history were reviewed and updated as appropriate: allergies, current medications, past family history, past medical history, past social history, past surgical history and problem list.    Review of Systems   Constitutional: Positive for fatigue.   Genitourinary: Positive for vaginal bleeding. Negative for vaginal discharge.   Neurological: Positive for weakness.   All other systems reviewed and are negative.      Current Outpatient Medications:   •  acetaminophen-codeine (TYLENOL #3) 300-30 MG per tablet, Take 1 tablet by mouth every 4-6 hours as needed for pain., Disp: 15 tablet, Rfl: 0  •  albuterol sulfate  (90 Base) MCG/ACT inhaler, Inhale 2 puffs by mouth every 4-6 hours as needed for cough, wheeze, shortness of breath. Use with vortex spacer, Disp: 18 g, Rfl: 3  •  amitriptyline (ELAVIL) 10 MG tablet, Take 10 mg by mouth 2 (Two) Times a Day., Disp: , Rfl:   •  amitriptyline (ELAVIL) 10 MG tablet, Take 2 tablets by mouth Every Evening., Disp: 90 tablet, Rfl: 3  •  Azelastine HCl 137 MCG/SPRAY solution, Instill 1-2 sprays in each nostril twice a day as needed, Disp: 30 mL, Rfl: 11  •  buPROPion SR (WELLBUTRIN SR) 150 MG 12 hr tablet, Take 1 tablet by mouth 2 (two) times a day., Disp: 60 tablet, Rfl: 0  •  calcium carbonate (Antacid Maximum) 1000 MG chewable tablet, Chew 500 mg 3 (Three) Times a  Day., Disp: , Rfl:   •  CBD (cannabidiol) oral oil, Take  by mouth 2 (Two) Times a Day., Disp: , Rfl:   •  chlorhexidine (PERIDEX) 0.12 % solution, Swish 15 mL around mouth for 30 seconds, then spit out. Use twice daily as directed. , Disp: 473 mL, Rfl: 0  •  cholecalciferol (VITAMIN D3) 25 MCG (1000 UT) tablet, Take 1,000 Units by mouth Daily., Disp: , Rfl:   •  clonazePAM (KlonoPIN) 0.5 MG tablet, Take 1 tablet by mouth At Night As Needed, Disp: 30 tablet, Rfl: 1  •  Dextromethorphan-guaiFENesin (Mucus-DM)  MG tablet sustained-release 12 hour, Take  by mouth., Disp: , Rfl:   •  dicyclomine (BENTYL) 10 MG capsule, Take 10 mg by mouth Daily., Disp: , Rfl:   •  diphenhydrAMINE (Benadryl Allergy) 25 MG tablet, Take 1-2 tablets by mouth Every 4-6 Hours As Needed., Disp: 90 tablet, Rfl: 11  •  EPINEPHrine (EPIPEN) 0.3 MG/0.3ML solution auto-injector injection, Use as directed for acute allergic reaction, Disp: 2 each, Rfl: 3  •  famotidine (PEPCID) 20 MG tablet, Take 20 mg by mouth 2 (Two) Times a Day., Disp: , Rfl:   •  ferrous sulfate 325 (65 Fe) MG tablet, Take  by mouth., Disp: , Rfl:   •  fexofenadine (ALLEGRA) 180 MG tablet, Take 180 mg by mouth Daily., Disp: , Rfl:   •  fluticasone (FLONASE) 50 MCG/ACT nasal spray, Instill 2 sprays into each nostril daily., Disp: 16 g, Rfl: 11  •  fluticasone (FLOVENT DISKUS) 50 MCG/BLIST diskus inhaler, Inhale 1 puff 2 (Two) Times a Day., Disp: , Rfl:   •  fluticasone-salmeterol (Advair HFA) 230-21 MCG/ACT inhaler, Inhale 2 puffs by mouth 2 (Two) Times a Day., Disp: 12 g, Rfl: 11  •  fluticasone-salmeterol (ADVAIR) 500-50 MCG/DOSE DISKUS, Inhale 1 puff., Disp: , Rfl:   •  furosemide (LASIX) 40 MG tablet, Take 40 mg by mouth 2 (Two) Times a Day., Disp: , Rfl:   •  gabapentin (NEURONTIN) 100 MG capsule, Take 100 mg by mouth Daily., Disp: , Rfl:   •  glucose-vitamin C ([employer] Glucose) 4-6 GM-MG chewable tablet chewable tablet, Chew., Disp: , Rfl:   •  guaiFENesin-codeine  (Virtussin A/C) 100-10 MG/5ML liquid, Take 5 mL by mouth 3 (Three) Times a Day As Needed for Cough., Disp: , Rfl:   •  hydrOXYzine pamoate (VISTARIL) 50 MG capsule, Take 50 mg by mouth Every Night., Disp: , Rfl:   •  ipratropium-albuterol (DUO-NEB) 0.5-2.5 mg/3 ml nebulizer, Inhale contents of 1 vial (3mL) via nebulizer every 4-6 hours as needed for coughing, wheezing, or shortness of breath., Disp: 300 mL, Rfl: 3  •  isosorbide dinitrate (ISORDIL) 10 MG tablet, Take 10 mg by mouth 3 (Three) Times a Day., Disp: , Rfl:   •  levothyroxine (SYNTHROID, LEVOTHROID) 50 MCG tablet, Take 50 mcg by mouth Daily., Disp: , Rfl:   •  levothyroxine (SYNTHROID, LEVOTHROID) 50 MCG tablet, Take 1 tablet by mouth Daily., Disp: 90 tablet, Rfl: 3  •  Magnesium 250 MG tablet, Take 500 mg by mouth Daily. Two tablets, Disp: , Rfl:   •  melatonin 5 MG tablet tablet, Take 5 mg by mouth Every Night., Disp: , Rfl:   •  mepolizumab (NUCALA) 100 MG reconstituted solution injection, Inject 100 mg under the skin into the appropriate area as directed Every 30 (Thirty) Days., Disp: , Rfl:   •  montelukast (SINGULAIR) 10 MG tablet, Take 10 mg by mouth Every Night., Disp: , Rfl:   •  montelukast (SINGULAIR) 10 MG tablet, Take 1 tablet by mouth every night at bedtime., Disp: 30 tablet, Rfl: 11  •  multivitamin with minerals (ONE-A-DAY PROACTIVE 65+ PO), Take 1 tablet by mouth Daily., Disp: , Rfl:   •  omeprazole (priLOSEC) 40 MG capsule, Take 40 mg by mouth 2 (two) times a day., Disp: , Rfl:   •  ondansetron (ZOFRAN) 4 MG tablet, Take 4 mg by mouth Every 8 (Eight) Hours As Needed for Nausea or Vomiting., Disp: , Rfl:   •  potassium chloride 10 MEQ CR tablet, Take 10 mEq by mouth 2 (Two) Times a Day., Disp: , Rfl:   •  potassium citrate (UROCIT-K) 10 MEQ (1080 MG) CR tablet, Take 10 mEq by mouth Daily., Disp: , Rfl:   •  predniSONE (DELTASONE) 20 MG tablet, Take 1 tablet by mouth Daily., Disp: 30 tablet, Rfl: 1  •  Spacer/Aero-Holding Chambers  "(AeroChamber Plus Wali-Vu) misc, Use as directed with albuterol inhaler, Disp: 1 each, Rfl: 0  •  tiotropium (SPIRIVA) 18 MCG per inhalation capsule, Place 1 capsule into inhaler and inhale Daily., Disp: , Rfl:   •  tiotropium bromide monohydrate (SPIRIVA RESPIMAT) 2.5 MCG/ACT aerosol solution inhaler, Inhale 2 puffs by mouth daily, Disp: 4 g, Rfl: 11  •  tiZANidine (ZANAFLEX) 2 MG tablet, Take 1 tablet by mouth Every Night., Disp: 90 tablet, Rfl: 1  •  ubrogepant (ubrogepant) 100 MG tablet, Take 1 tablet by mouth once per day as needed at onset of migraine, Disp: 10 tablet, Rfl: 11  •  valsartan (DIOVAN) 160 MG tablet, Take 1 tablet by mouth Daily., Disp: 90 tablet, Rfl: 3  •  vitamin B-12 (CYANOCOBALAMIN) 1000 MCG tablet, Take 1,000 mcg by mouth 2 (two) times a day., Disp: , Rfl:   •  allopurinol (Zyloprim) 100 MG tablet, Take 1 tablet by mouth Daily., Disp: 90 tablet, Rfl: 3  •  azithromycin (Zithromax) 250 MG tablet, Take 4 pills day of dental surgery., Disp: 12 tablet, Rfl: 0  •  predniSONE (DELTASONE) 10 MG tablet, Take 1 tablet by mouth Daily., Disp: 30 tablet, Rfl: 5  •  promethazine-dextromethorphan (PROMETHAZINE-DM) 6.25-15 MG/5ML syrup, Take 5 mL by mouth 4 (Four) Times a Day As Needed for Cough., Disp: 240 mL, Rfl: 0    Current Facility-Administered Medications:   •  heparin injection 500 Units, 5 mL, Intravenous, PRN, Sanjeev Rawls MD  •  sodium chloride 0.9 % flush 10 mL, 10 mL, Intravenous, Q12H, Sanjeev Rawls MD  •  sodium chloride 0.9 % flush 10 mL, 10 mL, Intravenous, PRN, Sanjeev Rawls MD  •  sodium chloride 0.9 % flush 20 mL, 20 mL, Intravenous, PRN, Sanjeev Rawls MD    Objective      Blood pressure 122/82, pulse 89, temperature 98.4 °F (36.9 °C), resp. rate 16, height 165.1 cm (65\"), weight 106 kg (233 lb), SpO2 97 %.    Physical Exam     General Appearance:    Alert, cooperative, no distress, appears stated age   Head:    Normocephalic, without obvious abnormality, atraumatic "   Eyes:    PERRL, conjunctiva/corneas clear, EOM's intact   Ears:    Normal TM's and external ear canals, both ears   Nose:   Nares normal, septum midline, mucosa normal, no drainage   or sinus tenderness   Throat:   Lips, mucosa, and tongue normal; teeth and gums normal   Neck:   Supple, symmetrical, trachea midline, no adenopathy;        thyroid:  No enlargement/tenderness/nodules; no carotid    bruit or JVD   Back:     Symmetric, no curvature, ROM normal, no CVA tenderness   Lungs:     Clear to auscultation bilaterally, respirations unlabored   Chest wall:    No tenderness or deformity   Heart:    Regular rate and rhythm, S1 and S2 normal, no murmur,        rub or gallop   Abdomen:     Soft, non-tender, bowel sounds active all four quadrants,     no masses, no organomegaly   Extremities:   Extremities normal, atraumatic, no cyanosis or edema   Pulses:   2+ and symmetric all extremities   Skin:   Skin color, texture, turgor normal, no rashes or lesions   Lymph nodes:   Cervical, supraclavicular, and axillary nodes normal   Neurologic:   CNII-XII intact. Normal strength, sensation and reflexes       throughout      Results for orders placed or performed during the hospital encounter of 12/15/20   Adult Transthoracic Echo Complete W/ Cont if Necessary Per Protocol   Result Value Ref Range    BSA 2.2 m^2    IVSd 1.3 cm    IVSs 1.3 cm    LVIDd 4.3 cm    LVIDs 2.8 cm    LVPWd 1.4 cm    BH CV ECHO CHRIS - LVPWS 2.3 cm    IVS/LVPW 0.93     FS 34.1 %    EDV(Teich) 80.8 ml    ESV(Teich) 29.6 ml    EF(Teich) 63.4 %    EDV(cubed) 76.8 ml    ESV(cubed) 22.0 ml    EF(cubed) 71.4 %    % IVS thick 0 %    % LVPW thick 70.4 %    LV mass(C)d 204.2 grams    LV mass(C)dI 93.1 grams/m^2    LV mass(C)s 195.4 grams    LV mass(C)sI 89.1 grams/m^2    SV(Teich) 51.3 ml    SI(Teich) 23.4 ml/m^2    SV(cubed) 54.8 ml    SI(cubed) 25.0 ml/m^2    Ao root diam 2.1 cm    Ao root area 3.5 cm^2    LA dimension 3.0 cm    LA/Ao 1.4     LVOT diam 2.0  cm    LVOT area 3.1 cm^2    LVOT area(traced) 3.1 cm^2    LAd major 6.0 cm    LVLd ap4 9.6 cm    EDV(MOD-sp4) 136.0 ml    LVLs ap4 8.5 cm    ESV(MOD-sp4) 53.0 ml    EF(MOD-sp4) 61.0 %    SV(MOD-sp4) 83.0 ml    SI(MOD-sp4) 37.8 ml/m^2    Ao root area (BSA corrected) 0.96     LV Lezama Vol (BSA corrected) 62.0 ml/m^2    LV Sys Vol (BSA corrected) 24.2 ml/m^2    MV E max scot 70.1 cm/sec    MV A max scot 127.0 cm/sec    MV E/A 0.55     LV IVRT 0.14 sec    MV V2 max 128.0 cm/sec    MV max PG 6.6 mmHg    MV V2 mean 75.3 cm/sec    MV mean PG 3.0 mmHg    MV V2 VTI 30.9 cm    MVA(VTI) 2.5 cm^2    MV P1/2t max scot 65.3 cm/sec    MV P1/2t 56.8 msec    MVA(P1/2t) 3.9 cm^2    MV dec slope 336.5 cm/sec^2    MV dec time 0.22 sec    Ao pk scot 254.3 cm/sec    Ao max PG 26.0 mmHg    Ao max PG (full) 20.3 mmHg    Ao V2 mean 168.0 cm/sec    Ao mean PG 13.7 mmHg    Ao mean PG (full) 10.7 mmHg    Ao V2 VTI 44.6 cm    DOMENIC(I,A) 1.7 cm^2    DOMENIC(I,D) 1.7 cm^2    DOMENIC(V,A) 1.5 cm^2    DOMENIC(V,D) 1.5 cm^2    AI max scot 416.0 cm/sec    AI max PG 69.2 mmHg    AI dec slope 429.5 cm/sec^2    AI P1/2t 283.7 msec    LV V1 max PG 5.7 mmHg    LV V1 mean PG 3.0 mmHg    LV V1 max 119.0 cm/sec    LV V1 mean 75.2 cm/sec    LV V1 VTI 24.7 cm    SV(Ao) 154.6 ml    SI(Ao) 70.5 ml/m^2    SV(LVOT) 77.6 ml    SI(LVOT) 35.4 ml/m^2    TV V2 max 53.6 cm/sec    TV max PG 1.1 mmHg    PA V2 max 91.4 cm/sec    PA max PG 3.3 mmHg    TR max scot 204.5 cm/sec    TR max PG 17.0 mmHg    RVSP(TR) 27.0 mmHg    RAP systole 10.0 mmHg    MVA P1/2T LCG 3.4 cm^2    Lat E/e'  8.7     Med E/e' 6.9     Lat Peak E' Scot 8.1 cm/sec    Med Peak E' Scot 10.2 cm/sec     CV ECHO CHRIS - BZI_BMI 42.4 kilograms/m^2     CV ECHO CHRIS - BSA(HAYCOCK) 2.4 m^2     CV ECHO CHRIS - BZI_METRIC_WEIGHT 115.7 kg     CV ECHO CHRIS - BZI_METRIC_HEIGHT 165.1 cm    Avg E/e' ratio 7.66     LA volume 70.0 cm3         Assessment/Plan       Diagnoses and all orders for this visit:    1. Cough (Primary)    2.  Polymyositis (CMS/HCC)  -     predniSONE (DELTASONE) 20 MG tablet; Take 1 tablet by mouth Daily.  Dispense: 30 tablet; Refill: 1    3. Hyperuricemia  -     Uric Acid    Other orders  -     valsartan (DIOVAN) 160 MG tablet; Take 1 tablet by mouth Daily.  Dispense: 90 tablet; Refill: 3  -     Discontinue: azithromycin (ZITHROMAX) 250 MG tablet; Take 2 tablets by mouth on day 1, then take 1 tablet daily for 4 days  Dispense: 6 tablet; Refill: 0  -     Cancel: guaiFENesin-codeine (Virtussin A/C) 100-10 MG/5ML liquid; Take 5 mL by mouth 3 (Three) Times a Day As Needed for Cough.  Dispense: 118 mL; Refill: 1  -     promethazine-dextromethorphan (PROMETHAZINE-DM) 6.25-15 MG/5ML syrup; Take 5 mL by mouth 4 (Four) Times a Day As Needed for Cough.  Dispense: 240 mL; Refill: 0  -     allopurinol (Zyloprim) 100 MG tablet; Take 1 tablet by mouth Daily.  Dispense: 90 tablet; Refill: 3  -     predniSONE (DELTASONE) 10 MG tablet; Take 1 tablet by mouth Daily.  Dispense: 30 tablet; Refill: 5  -     azithromycin (Zithromax) 250 MG tablet; Take 4 pills day of dental surgery.  Dispense: 12 tablet; Refill: 0      Return in about 4 weeks (around 2/12/2021).          There are no Patient Instructions on file for this visit.     Sanjeev Rawls MD    Assessment/Plan

## 2021-01-15 NOTE — TELEPHONE ENCOUNTER
KRISTIE WITH [facility] STATES THEY RECEIVED TWO DIFFERENT PRESCRIPTIONS TODAY, 01/15/2021, FOR PREDNISONE ON THE PATIENT.  ONE WAS A 10MG DOSE AND THE OTHER A 20MG DOSE.  KRISTIE IS REQUESTING A CALL BACK FOR CLARIFICATION -[phone]

## 2021-01-18 RX ORDER — BUPROPION HYDROCHLORIDE 150 MG/1
150 TABLET, EXTENDED RELEASE ORAL 2 TIMES DAILY
Qty: 60 TABLET | Refills: 11 | Status: SHIPPED | OUTPATIENT
Start: 2021-01-18 | End: 2021-02-12 | Stop reason: SDUPTHER

## 2021-01-26 ENCOUNTER — OFFICE VISIT (OUTPATIENT)
Dept: NEUROLOGY | Facility: CLINIC | Age: 66
End: 2021-01-26

## 2021-01-26 VITALS
TEMPERATURE: 97.7 F | WEIGHT: 253.2 LBS | DIASTOLIC BLOOD PRESSURE: 80 MMHG | SYSTOLIC BLOOD PRESSURE: 100 MMHG | HEART RATE: 91 BPM | OXYGEN SATURATION: 97 % | HEIGHT: 65 IN | BODY MASS INDEX: 42.19 KG/M2

## 2021-01-26 DIAGNOSIS — E03.9 ACQUIRED HYPOTHYROIDISM: ICD-10-CM

## 2021-01-26 DIAGNOSIS — G47.30 SLEEP APNEA, UNSPECIFIED TYPE: ICD-10-CM

## 2021-01-26 DIAGNOSIS — D89.89 AUTOIMMUNE DISORDER (HCC): ICD-10-CM

## 2021-01-26 DIAGNOSIS — I71.40 ABDOMINAL AORTIC ANEURYSM (AAA) WITHOUT RUPTURE (HCC): ICD-10-CM

## 2021-01-26 DIAGNOSIS — G47.09 OTHER INSOMNIA: ICD-10-CM

## 2021-01-26 DIAGNOSIS — R06.83 SNORING: Primary | ICD-10-CM

## 2021-01-26 PROCEDURE — 99213 OFFICE O/P EST LOW 20 MIN: CPT | Performed by: NURSE PRACTITIONER

## 2021-01-26 NOTE — PROGRESS NOTES
"     New Sleep Patient Office Visit      Patient Name: Raquel Mariee  : 1955   MRN: [id number]     Referring Physician: Sanjeev Rawls MD    Chief Complaint:    Chief Complaint   Patient presents with   • Consult     NP, in office to establish care for KALYN.Patient c/o snoring, restless sleep.        History of Present Illness: Raquel Mariee is a 65 y.o. female who is here today to establish care with Sleep Medicine.  She says she is here because her PCP thinks she has \"breathing issues at night\".  Howard score 0.  Sleep questionnaire reviewed.  She snores.  She wakes up with a dry mouth.  She feels rested upon awakening normally.  She feels better with more sleep.  She has awakened at night with a burning sensation in her chest.  She is taking medication for reflux.  It takes her up to an hour to fall asleep.  She is in bed for 10-11 hours per night.  She wakes up 2-3 times during the night.  She says she was diagnosed with KALYN prior to having weight loss surgery.  Additional risk factors- BMI 42, CHF, AAA, hypothyroidism, history of weight loss surgery (18 years ago), mood disorder, polymyositis, history of irregular heartbeat, history of KALYN with therapy, autoimmune disorder.     Subjective      Review of Systems:   Review of Systems   Constitutional: Negative for fatigue, fever, unexpected weight gain and unexpected weight loss.   HENT: Negative for hearing loss, sore throat, swollen glands, tinnitus and trouble swallowing.    Eyes: Negative for blurred vision, double vision, photophobia and visual disturbance.   Respiratory: Negative for cough, chest tightness and shortness of breath.    Cardiovascular: Negative for chest pain, palpitations and leg swelling.   Gastrointestinal: Negative for constipation, diarrhea and nausea.   Endocrine: Negative for cold intolerance and heat intolerance.   Musculoskeletal: Negative for gait problem, neck pain and neck stiffness.   Skin: Negative for color " change and rash.   Allergic/Immunologic: Negative for environmental allergies and food allergies.   Neurological: Negative for dizziness, syncope, facial asymmetry, speech difficulty, weakness, headache, memory problem and confusion.   Psychiatric/Behavioral: Negative for agitation, behavioral problems and depressed mood. The patient is not nervous/anxious.        Past Medical History:   Past Medical History:   Diagnosis Date   • Anxiety    • COPD (chronic obstructive pulmonary disease) (CMS/HCC)    • Depression    • Diabetes mellitus (CMS/[facility])    • Eosinophilic asthma    • Fibromyalgia, primary    • Hypertension    • Hypogammaglobulinemia (CMS/[facility])    • Hypothyroidism    • Irritable bowel syndrome    • Migraines    • Morbid obesity (CMS/[facility])    • Osteoarthritis    • Prinzmetal angina (CMS/HCC)    • Prinzmetal angina (CMS/[facility]) 1990   • PTSD (post-traumatic stress disorder)    • Renal insufficiency    • Sinus tachycardia 1990   • TIA (transient ischemic attack)        Past Surgical History:   Past Surgical History:   Procedure Laterality Date   • APPENDECTOMY     • BUNIONECTOMY     • GASTRIC BYPASS     • HAND SURGERY Right    • REPLACEMENT TOTAL KNEE BILATERAL     • TONSILLECTOMY     • TOTAL ABDOMINAL HYSTERECTOMY WITH SALPINGO OOPHORECTOMY         Family History:   Family History   Problem Relation Age of Onset   • Diabetes Mother    • Stroke Mother    • Heart disease Mother    • Hypertension Mother    • Endometriosis Mother    • Diabetes Father    • Hypertension Father    • Stroke Father    • Heart attack Father    • Asthma Father    • COPD Father    • Dementia Father    • COPD Sister    • Asthma Sister    • Cancer Sister    • Brain cancer Sister    • Diabetes Sister    • Stroke Sister    • Heart attack Sister    • Endometriosis Sister    • Depression Sister    • COPD Brother    • Asthma Brother    • Diabetes Brother    • Asthma Daughter    • Endometriosis Daughter    • Osteoarthritis Daughter    • Hypertension  Daughter    • Kidney failure Daughter    • Irritable bowel syndrome Daughter    • Anxiety disorder Daughter    • Bipolar disorder Daughter    • Depression Daughter    • Self-Injurious Behavior  Daughter    • Suicide Attempts Daughter    • Asthma Son    • ADD / ADHD Son    • Alcohol abuse Son    • Drug abuse Son    • Asthma Brother    • COPD Brother    • Diabetes Brother    • Hypertension Brother    • Kidney disease Sister    • Diabetes Sister    • COPD Sister    • Asthma Sister    • Endometriosis Sister    • Depression Sister    • Heart attack Sister    • Endometriosis Sister    • Asthma Daughter    • Endometriosis Daughter    • Osteoarthritis Daughter    • OCD Neg Hx    • Paranoid behavior Neg Hx    • Schizophrenia Neg Hx    • Seizures Neg Hx        Social History:   Social History     Socioeconomic History   • Marital status:      Spouse name: Not on file   • Number of children: Not on file   • Years of education: Not on file   • Highest education level: Not on file   Tobacco Use   • Smoking status: Never Smoker   • Smokeless tobacco: Never Used   Substance and Sexual Activity   • Alcohol use: Not Currently     Frequency: Never     Comment: ONCE A YEAR   • Drug use: Never   • Sexual activity: Defer       Medications:     Current Outpatient Medications:   •  acetaminophen-codeine (TYLENOL #3) 300-30 MG per tablet, Take 1 tablet by mouth every 4-6 hours as needed for pain., Disp: 12 tablet, Rfl: 0  •  albuterol sulfate  (90 Base) MCG/ACT inhaler, Inhale 2 puffs by mouth every 4-6 hours as needed for cough, wheeze, shortness of breath. Use with vortex spacer, Disp: 18 g, Rfl: 3  •  allopurinol (Zyloprim) 100 MG tablet, Take 1 tablet by mouth Daily., Disp: 90 tablet, Rfl: 3  •  amitriptyline (ELAVIL) 10 MG tablet, Take 10 mg by mouth 2 (Two) Times a Day., Disp: , Rfl:   •  amitriptyline (ELAVIL) 10 MG tablet, Take 2 tablets by mouth Every Evening., Disp: 90 tablet, Rfl: 3  •  Azelastine HCl 137 MCG/SPRAY  solution, Instill 1-2 sprays in each nostril twice a day as needed, Disp: 30 mL, Rfl: 11  •  azithromycin (Zithromax) 250 MG tablet, Take 4 tablets by mouth on day of dental surgery., Disp: 12 tablet, Rfl: 0  •  buPROPion SR (WELLBUTRIN SR) 150 MG 12 hr tablet, Take 1 tablet by mouth 2 (two) times a day., Disp: 60 tablet, Rfl: 11  •  calcium carbonate (Antacid Maximum) 1000 MG chewable tablet, Chew 500 mg 3 (Three) Times a Day., Disp: , Rfl:   •  CBD (cannabidiol) oral oil, Take  by mouth 2 (Two) Times a Day., Disp: , Rfl:   •  chlorhexidine (PERIDEX) 0.12 % solution, Swish 15 mL around mouth for 30 seconds, then spit out. Use twice daily as directed. , Disp: 473 mL, Rfl: 0  •  cholecalciferol (VITAMIN D3) 25 MCG (1000 UT) tablet, Take 1,000 Units by mouth Daily., Disp: , Rfl:   •  clonazePAM (KlonoPIN) 0.5 MG tablet, Take 1 tablet by mouth At Night As Needed, Disp: 30 tablet, Rfl: 1  •  Dextromethorphan-guaiFENesin (Mucus-DM)  MG tablet sustained-release 12 hour, Take  by mouth., Disp: , Rfl:   •  dicyclomine (BENTYL) 10 MG capsule, Take 10 mg by mouth Daily., Disp: , Rfl:   •  diphenhydrAMINE (Benadryl Allergy) 25 MG tablet, Take 1-2 tablets by mouth Every 4-6 Hours As Needed., Disp: 90 tablet, Rfl: 11  •  EPINEPHrine (EPIPEN) 0.3 MG/0.3ML solution auto-injector injection, Use as directed for acute allergic reaction, Disp: 2 each, Rfl: 3  •  famotidine (PEPCID) 20 MG tablet, Take 20 mg by mouth 2 (Two) Times a Day., Disp: , Rfl:   •  ferrous sulfate 325 (65 Fe) MG tablet, Take  by mouth., Disp: , Rfl:   •  fexofenadine (ALLEGRA) 180 MG tablet, Take 180 mg by mouth Daily., Disp: , Rfl:   •  fluticasone (FLONASE) 50 MCG/ACT nasal spray, Instill 2 sprays into each nostril daily., Disp: 16 g, Rfl: 11  •  fluticasone (FLOVENT DISKUS) 50 MCG/BLIST diskus inhaler, Inhale 1 puff 2 (Two) Times a Day., Disp: , Rfl:   •  fluticasone-salmeterol (Advair HFA) 230-21 MCG/ACT inhaler, Inhale 2 puffs by mouth 2 (Two) Times a  Day., Disp: 12 g, Rfl: 11  •  fluticasone-salmeterol (ADVAIR) 500-50 MCG/DOSE DISKUS, Inhale 1 puff., Disp: , Rfl:   •  furosemide (LASIX) 40 MG tablet, Take 40 mg by mouth 2 (Two) Times a Day., Disp: , Rfl:   •  gabapentin (NEURONTIN) 100 MG capsule, Take 100 mg by mouth Daily., Disp: , Rfl:   •  glucose-vitamin C ([employer] Glucose) 4-6 GM-MG chewable tablet chewable tablet, Chew., Disp: , Rfl:   •  guaiFENesin-codeine (Virtussin A/C) 100-10 MG/5ML liquid, Take 5 mL by mouth 3 (Three) Times a Day As Needed for Cough., Disp: , Rfl:   •  hydrOXYzine pamoate (VISTARIL) 50 MG capsule, Take 50 mg by mouth Every Night., Disp: , Rfl:   •  ipratropium-albuterol (DUO-NEB) 0.5-2.5 mg/3 ml nebulizer, Inhale contents of 1 vial (3mL) via nebulizer every 4-6 hours as needed for coughing, wheezing, or shortness of breath., Disp: 300 mL, Rfl: 3  •  isosorbide dinitrate (ISORDIL) 10 MG tablet, Take 10 mg by mouth 3 (Three) Times a Day., Disp: , Rfl:   •  levothyroxine (SYNTHROID, LEVOTHROID) 50 MCG tablet, Take 50 mcg by mouth Daily., Disp: , Rfl:   •  levothyroxine (SYNTHROID, LEVOTHROID) 50 MCG tablet, Take 1 tablet by mouth Daily., Disp: 90 tablet, Rfl: 3  •  Magnesium 250 MG tablet, Take 500 mg by mouth Daily. Two tablets, Disp: , Rfl:   •  melatonin 5 MG tablet tablet, Take 5 mg by mouth Every Night., Disp: , Rfl:   •  mepolizumab (NUCALA) 100 MG reconstituted solution injection, Inject 100 mg under the skin into the appropriate area as directed Every 30 (Thirty) Days., Disp: , Rfl:   •  montelukast (SINGULAIR) 10 MG tablet, Take 10 mg by mouth Every Night., Disp: , Rfl:   •  montelukast (SINGULAIR) 10 MG tablet, Take 1 tablet by mouth every night at bedtime., Disp: 30 tablet, Rfl: 11  •  multivitamin with minerals (ONE-A-DAY PROACTIVE 65+ PO), Take 1 tablet by mouth Daily., Disp: , Rfl:   •  omeprazole (priLOSEC) 40 MG capsule, Take 40 mg by mouth 2 (two) times a day., Disp: , Rfl:   •  ondansetron (ZOFRAN) 4 MG tablet, Take 4  mg by mouth Every 8 (Eight) Hours As Needed for Nausea or Vomiting., Disp: , Rfl:   •  potassium chloride 10 MEQ CR tablet, Take 10 mEq by mouth 2 (Two) Times a Day., Disp: , Rfl:   •  potassium citrate (UROCIT-K) 10 MEQ (1080 MG) CR tablet, Take 10 mEq by mouth Daily., Disp: , Rfl:   •  predniSONE (DELTASONE) 10 MG tablet, Take 1 tablet by mouth Daily., Disp: 30 tablet, Rfl: 5  •  promethazine-dextromethorphan (PROMETHAZINE-DM) 6.25-15 MG/5ML syrup, Take 5 mL by mouth 4 (Four) Times a Day As Needed for Cough., Disp: 240 mL, Rfl: 0  •  Spacer/Aero-Holding Chambers (AeroChamber Plus Wali-Vu) misc, Use as directed with albuterol inhaler, Disp: 1 each, Rfl: 0  •  tiotropium (SPIRIVA) 18 MCG per inhalation capsule, Place 1 capsule into inhaler and inhale Daily., Disp: , Rfl:   •  tiotropium bromide monohydrate (SPIRIVA RESPIMAT) 2.5 MCG/ACT aerosol solution inhaler, Inhale 2 puffs by mouth daily, Disp: 4 g, Rfl: 11  •  tiZANidine (ZANAFLEX) 2 MG tablet, Take 1 tablet by mouth Every Night., Disp: 90 tablet, Rfl: 1  •  ubrogepant (ubrogepant) 100 MG tablet, Take 1 tablet by mouth once per day as needed at onset of migraine, Disp: 10 tablet, Rfl: 11  •  valsartan (DIOVAN) 160 MG tablet, Take 1 tablet by mouth Daily., Disp: 90 tablet, Rfl: 3  •  vitamin B-12 (CYANOCOBALAMIN) 1000 MCG tablet, Take 1,000 mcg by mouth 2 (two) times a day., Disp: , Rfl:   •  Zoster Vac Recomb Adjuvanted 50 MCG/0.5ML reconstituted suspension, Inject as directed per protocol, Disp: 1 each, Rfl: 0    Current Facility-Administered Medications:   •  heparin injection 500 Units, 5 mL, Intravenous, PRN, Sanjeev Rawls MD  •  sodium chloride 0.9 % flush 10 mL, 10 mL, Intravenous, Q12H, Sanjeev Rawls MD  •  sodium chloride 0.9 % flush 10 mL, 10 mL, Intravenous, PRN, Sanjeev Rawls MD  •  sodium chloride 0.9 % flush 20 mL, 20 mL, Intravenous, PRN, Sanjeev Rawls MD    Allergies:   Allergies   Allergen Reactions   • Cefaclor Hives and Swelling  "    Other reaction(s): SWELLING  Shed 4 layers of skin and extremely SOB  Shed 4 layers of skin and extremely SOB     • Cephalosporins Hives, Angioedema and Swelling     Rechallenge with cefipime caused rash on 1/14/08.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     • Ambien [Zolpidem Tartrate] Mental Status Change   • Amoxicillin Rash   • Cardizem [Diltiazem Hcl] Swelling   • Erythromycin Nausea And Vomiting     gi upset    But she does tolerate azithromycin ok     • Hydrocodone GI Intolerance   • Lexapro [Escitalopram Oxalate] Other (See Comments)     Kidney Failure   • Metoclopramide Other (See Comments) and Mental Status Change     confusion  confusion     • Mobic [Meloxicam] Other (See Comments)     CKD   • Sumatriptan Other (See Comments) and Unknown - High Severity     Chest pain   Chest pain     • Topamax [Topiramate] Other (See Comments)     Memory loss and twitching.   • Vancomycin Rash     Large rash arms and thighs after pre op dose gone next day  Rash      • Verapamil Nausea And Vomiting     Dizzy   • Ciprofloxacin Rash     Tolerated Levaquin without difficulty.     • Clindamycin Hcl Nausea And Vomiting, Rash and Hives     Does not fully remember this allergy     • Edecrin [Ethacrynic Acid] Other (See Comments)     Weight gain   • Hydrochlorothiazide Hives   • Hydrocodone-Acetaminophen GI Intolerance   • Sulfa Antibiotics Hives       Objective     Physical Exam:  Vital Signs:   Vitals:    01/26/21 0828   BP: 100/80   BP Location: Left arm   Patient Position: Sitting   Cuff Size: Adult   Pulse: 91   Temp: 97.7 °F (36.5 °C)   SpO2: 97%   Weight: 115 kg (253 lb 3.2 oz)   Height: 165.1 cm (65\")   PainSc:   4   PainLoc: Mouth  Comment: patient teeth pulled for dentures.     BMI: Body mass index is 42.13 kg/m².  Neck Circumference: 14in     Physical Exam  Vitals signs and nursing note reviewed.   Constitutional:       General: She is not in acute distress.     Appearance: She " is well-developed. She is obese. She is not diaphoretic.   HENT:      Head: Normocephalic and atraumatic.      Comments: Mallampati 3-4  Eyes:      Conjunctiva/sclera: Conjunctivae normal.      Pupils: Pupils are equal, round, and reactive to light.   Neck:      Musculoskeletal: Neck supple.      Thyroid: No thyroid mass or thyromegaly.      Vascular: Normal carotid pulses.      Trachea: Trachea normal.   Cardiovascular:      Rate and Rhythm: Normal rate and regular rhythm.      Heart sounds: Murmur present. No friction rub. No gallop.       Comments: LUSB murmur  Pulmonary:      Effort: Pulmonary effort is normal. No respiratory distress.      Breath sounds: Normal breath sounds. No wheezing or rales.   Musculoskeletal: Normal range of motion.   Skin:     General: Skin is warm and dry.      Findings: No rash.   Neurological:      Mental Status: She is alert and oriented to person, place, and time.   Psychiatric:         Behavior: Behavior normal.         Thought Content: Thought content normal.         Assessment / Plan      Assessment/Plan:   Diagnoses and all orders for this visit:    1. Snoring (Primary)  -     Polysomnography 4 or More Parameters With CPAP; Future  - Advised patient to avoid driving if drowsy.   - Printed patient education on KALYN provided today.     2. Abdominal aortic aneurysm (AAA) without rupture (CMS/HCC)  -     Polysomnography 4 or More Parameters With CPAP; Future    3. Acquired hypothyroidism  -     Polysomnography 4 or More Parameters With CPAP; Future    4. Other insomnia  -     Polysomnography 4 or More Parameters With CPAP; Future    5. BMI 40.0-44.9, adult (CMS/HCC)  -     Polysomnography 4 or More Parameters With CPAP; Future  - Encouraged weight loss with a BMI goal of 24.    6. Autoimmune disorder (CMS/HCC)  -     Polysomnography 4 or More Parameters With CPAP; Future    7. Sleep apnea, unspecified type   -     Polysomnography 4 or More Parameters With CPAP; Future       Follow  Up:   Return in about 3 months (around 4/26/2021) for F/U Obstructive Sleep Apnea.    I have advised the patient the need to continue the use of CPAP.  Gold standard for treatment of sleep apnea includes weight loss, use of cpap and avoidance of alcohol.  Untreated KALYN may increase the risk for development of hypertension, stroke, myocardial infarction, diabetes, cardiovascular disease, work-related issues and driving accidents. I have counseled and advised the patient to avoid driving or operating heavy/dangerous equipment if feeling drowsy.     LILIBETH Cowart, FNP-C  [facility]       Please note that portions of this note may have been completed with a voice recognition program. Efforts were made to edit the dictations, but occasionally words are mistranscribed.

## 2021-01-26 NOTE — PATIENT INSTRUCTIONS
Sleep Apnea  Sleep apnea affects breathing during sleep. It causes breathing to stop for a short time or to become shallow. It can also increase the risk of:  · Heart attack.  · Stroke.  · Being very overweight (obese).  · Diabetes.  · Heart failure.  · Irregular heartbeat.  The goal of treatment is to help you breathe normally again.  What are the causes?  There are three kinds of sleep apnea:  · Obstructive sleep apnea. This is caused by a blocked or collapsed airway.  · Central sleep apnea. This happens when the brain does not send the right signals to the muscles that control breathing.  · Mixed sleep apnea. This is a combination of obstructive and central sleep apnea.  The most common cause of this condition is a collapsed or blocked airway. This can happen if:  · Your throat muscles are too relaxed.  · Your tongue and tonsils are too large.  · You are overweight.  · Your airway is too small.  What increases the risk?  · Being overweight.  · Smoking.  · Having a small airway.  · Being older.  · Being male.  · Drinking alcohol.  · Taking medicines to calm yourself (sedatives or tranquilizers).  · Having family members with the condition.  What are the signs or symptoms?  · Trouble staying asleep.  · Being sleepy or tired during the day.  · Getting angry a lot.  · Loud snoring.  · Headaches in the morning.  · Not being able to focus your mind (concentrate).  · Forgetting things.  · Less interest in sex.  · Mood swings.  · Personality changes.  · Feelings of sadness (depression).  · Waking up a lot during the night to pee (urinate).  · Dry mouth.  · Sore throat.  How is this diagnosed?  · Your medical history.  · A physical exam.  · A test that is done when you are sleeping (sleep study). The test is most often done in a sleep lab but may also be done at home.  How is this treated?    · Sleeping on your side.  · Using a medicine to get rid of mucus in your nose (decongestant).  · Avoiding the use of alcohol,  medicines to help you relax, or certain pain medicines (narcotics).  · Losing weight, if needed.  · Changing your diet.  · Not smoking.  · Using a machine to open your airway while you sleep, such as:  ? An oral appliance. This is a mouthpiece that shifts your lower jaw forward.  ? A CPAP device. This device blows air through a mask when you breathe out (exhale).  ? An EPAP device. This has valves that you put in each nostril.  ? A BPAP device. This device blows air through a mask when you breathe in (inhale) and breathe out.  · Having surgery if other treatments do not work.  It is important to get treatment for sleep apnea. Without treatment, it can lead to:  · High blood pressure.  · Coronary artery disease.  · In men, not being able to have an erection (impotence).  · Reduced thinking ability.  Follow these instructions at home:  Lifestyle  · Make changes that your doctor recommends.  · Eat a healthy diet.  · Lose weight if needed.  · Avoid alcohol, medicines to help you relax, and some pain medicines.  · Do not use any products that contain nicotine or tobacco, such as cigarettes, e-cigarettes, and chewing tobacco. If you need help quitting, ask your doctor.  General instructions  · Take over-the-counter and prescription medicines only as told by your doctor.  · If you were given a machine to use while you sleep, use it only as told by your doctor.  · If you are having surgery, make sure to tell your doctor you have sleep apnea. You may need to bring your device with you.  · Keep all follow-up visits as told by your doctor. This is important.  Contact a doctor if:  · The machine that you were given to use during sleep bothers you or does not seem to be working.  · You do not get better.  · You get worse.  Get help right away if:  · Your chest hurts.  · You have trouble breathing in enough air.  · You have an uncomfortable feeling in your back, arms, or stomach.  · You have trouble talking.  · One side of your  body feels weak.  · A part of your face is hanging down.  These symptoms may be an emergency. Do not wait to see if the symptoms will go away. Get medical help right away. Call your local emergency services (911 in the [place] Do not drive yourself to the hospital.  Summary  · This condition affects breathing during sleep.  · The most common cause is a collapsed or blocked airway.  · The goal of treatment is to help you breathe normally while you sleep.  This information is not intended to replace advice given to you by your health care provider. Make sure you discuss any questions you have with your health care provider.  Document Revised: 10/04/2019 Document Reviewed: 08/13/2019  Elsevier Patient Education © 2020 [employer]

## 2021-01-29 ENCOUNTER — HOSPITAL ENCOUNTER (OUTPATIENT)
Dept: INFUSION THERAPY | Facility: HOSPITAL | Age: 66
Setting detail: INFUSION SERIES
Discharge: HOME | End: 2021-01-29

## 2021-01-29 VITALS
OXYGEN SATURATION: 98 % | HEIGHT: 65 IN | WEIGHT: 250 LBS | RESPIRATION RATE: 16 BRPM | TEMPERATURE: 97.7 F | SYSTOLIC BLOOD PRESSURE: 147 MMHG | HEART RATE: 64 BPM | BODY MASS INDEX: 41.65 KG/M2 | DIASTOLIC BLOOD PRESSURE: 77 MMHG

## 2021-01-29 DIAGNOSIS — Z95.828 PORT-A-CATH IN PLACE: Primary | ICD-10-CM

## 2021-01-29 LAB
ALBUMIN SERPL-MCNC: 4.1 G/DL (ref 3.5–5.2)
ALBUMIN/GLOB SERPL: 2.1 G/DL
ALP SERPL-CCNC: 76 U/L (ref 39–117)
ALT SERPL W P-5'-P-CCNC: 13 U/L (ref 1–33)
ANION GAP SERPL CALCULATED.3IONS-SCNC: 9.9 MMOL/L (ref 5–15)
AST SERPL-CCNC: 21 U/L (ref 1–32)
BILIRUB SERPL-MCNC: 0.3 MG/DL (ref 0–1.2)
BUN SERPL-MCNC: 12 MG/DL (ref 8–23)
BUN/CREAT SERPL: 9.2 (ref 7–25)
CALCIUM SPEC-SCNC: 8.9 MG/DL (ref 8.6–10.5)
CHLORIDE SERPL-SCNC: 96 MMOL/L (ref 98–107)
CK SERPL-CCNC: 144 U/L (ref 20–180)
CO2 SERPL-SCNC: 24.1 MMOL/L (ref 22–29)
CREAT SERPL-MCNC: 1.3 MG/DL (ref 0.57–1)
GFR SERPL CREATININE-BSD FRML MDRD: 41 ML/MIN/1.73
GLOBULIN UR ELPH-MCNC: 2 GM/DL
GLUCOSE SERPL-MCNC: 138 MG/DL (ref 65–99)
MYOGLOBIN SERPL-MCNC: 64.7 NG/ML (ref 25–58)
POTASSIUM SERPL-SCNC: 4.8 MMOL/L (ref 3.5–5.2)
PROT SERPL-MCNC: 6.1 G/DL (ref 6–8.5)
SODIUM SERPL-SCNC: 130 MMOL/L (ref 136–145)
URATE SERPL-MCNC: 3.5 MG/DL (ref 2.4–5.7)

## 2021-01-29 PROCEDURE — 84550 ASSAY OF BLOOD/URIC ACID: CPT | Performed by: INTERNAL MEDICINE

## 2021-01-29 PROCEDURE — 25010000003 HEPARIN LOCK FLUSH PER 10 UNITS: Performed by: INTERNAL MEDICINE

## 2021-01-29 PROCEDURE — 83874 ASSAY OF MYOGLOBIN: CPT | Performed by: INTERNAL MEDICINE

## 2021-01-29 PROCEDURE — 96523 IRRIG DRUG DELIVERY DEVICE: CPT

## 2021-01-29 PROCEDURE — 36591 DRAW BLOOD OFF VENOUS DEVICE: CPT

## 2021-01-29 PROCEDURE — 80053 COMPREHEN METABOLIC PANEL: CPT | Performed by: INTERNAL MEDICINE

## 2021-01-29 PROCEDURE — 82550 ASSAY OF CK (CPK): CPT | Performed by: INTERNAL MEDICINE

## 2021-01-29 RX ORDER — HEPARIN SODIUM (PORCINE) LOCK FLUSH IV SOLN 100 UNIT/ML 100 UNIT/ML
500 SOLUTION INTRAVENOUS AS NEEDED
Status: CANCELLED | OUTPATIENT
Start: 2021-01-29

## 2021-01-29 RX ORDER — SODIUM CHLORIDE 0.9 % (FLUSH) 0.9 %
10 SYRINGE (ML) INJECTION AS NEEDED
Status: DISCONTINUED | OUTPATIENT
Start: 2021-01-29 | End: 2021-01-31 | Stop reason: HOSPADM

## 2021-01-29 RX ORDER — SODIUM CHLORIDE 0.9 % (FLUSH) 0.9 %
10 SYRINGE (ML) INJECTION AS NEEDED
Status: CANCELLED | OUTPATIENT
Start: 2021-01-29

## 2021-01-29 RX ORDER — HEPARIN SODIUM (PORCINE) LOCK FLUSH IV SOLN 100 UNIT/ML 100 UNIT/ML
500 SOLUTION INTRAVENOUS AS NEEDED
Status: DISCONTINUED | OUTPATIENT
Start: 2021-01-29 | End: 2021-01-31 | Stop reason: HOSPADM

## 2021-01-29 RX ADMIN — HEPARIN 500 UNITS: 100 SYRINGE at 13:06

## 2021-01-29 RX ADMIN — SODIUM CHLORIDE, PRESERVATIVE FREE 10 ML: 5 INJECTION INTRAVENOUS at 13:06

## 2021-02-01 ENCOUNTER — APPOINTMENT (OUTPATIENT)
Dept: INFUSION THERAPY | Facility: HOSPITAL | Age: 66
End: 2021-02-01

## 2021-02-05 ENCOUNTER — LAB (OUTPATIENT)
Dept: LAB | Facility: HOSPITAL | Age: 66
End: 2021-02-05

## 2021-02-05 ENCOUNTER — TRANSCRIBE ORDERS (OUTPATIENT)
Dept: LAB | Facility: HOSPITAL | Age: 66
End: 2021-02-05

## 2021-02-05 DIAGNOSIS — Z01.818 PRE-OP TESTING: Primary | ICD-10-CM

## 2021-02-05 DIAGNOSIS — Z01.818 PRE-OP TESTING: ICD-10-CM

## 2021-02-05 PROCEDURE — C9803 HOPD COVID-19 SPEC COLLECT: HCPCS

## 2021-02-05 PROCEDURE — U0004 COV-19 TEST NON-CDC HGH THRU: HCPCS

## 2021-02-06 LAB — SARS-COV-2 RNA RESP QL NAA+PROBE: NOT DETECTED

## 2021-02-09 ENCOUNTER — HOSPITAL ENCOUNTER (OUTPATIENT)
Dept: SLEEP MEDICINE | Facility: HOSPITAL | Age: 66
Discharge: HOME | End: 2021-02-09
Admitting: NURSE PRACTITIONER

## 2021-02-09 DIAGNOSIS — G47.30 SLEEP APNEA, UNSPECIFIED TYPE: ICD-10-CM

## 2021-02-09 DIAGNOSIS — D89.89 AUTOIMMUNE DISORDER (HCC): ICD-10-CM

## 2021-02-09 DIAGNOSIS — I71.40 ABDOMINAL AORTIC ANEURYSM (AAA) WITHOUT RUPTURE (HCC): ICD-10-CM

## 2021-02-09 DIAGNOSIS — G47.09 OTHER INSOMNIA: ICD-10-CM

## 2021-02-09 DIAGNOSIS — E03.9 ACQUIRED HYPOTHYROIDISM: ICD-10-CM

## 2021-02-09 DIAGNOSIS — R06.83 SNORING: ICD-10-CM

## 2021-02-09 PROCEDURE — 95811 POLYSOM 6/>YRS CPAP 4/> PARM: CPT | Performed by: INTERNAL MEDICINE

## 2021-02-09 PROCEDURE — 95811 POLYSOM 6/>YRS CPAP 4/> PARM: CPT

## 2021-02-12 ENCOUNTER — OFFICE VISIT (OUTPATIENT)
Dept: INTERNAL MEDICINE | Facility: CLINIC | Age: 66
End: 2021-02-12

## 2021-02-12 VITALS
RESPIRATION RATE: 17 BRPM | TEMPERATURE: 98 F | HEART RATE: 100 BPM | OXYGEN SATURATION: 96 % | WEIGHT: 248 LBS | BODY MASS INDEX: 41.32 KG/M2 | HEIGHT: 65 IN | SYSTOLIC BLOOD PRESSURE: 118 MMHG | DIASTOLIC BLOOD PRESSURE: 72 MMHG

## 2021-02-12 DIAGNOSIS — M33.20 POLYMYOSITIS (HCC): Primary | ICD-10-CM

## 2021-02-12 DIAGNOSIS — R60.0 LOCALIZED EDEMA: ICD-10-CM

## 2021-02-12 DIAGNOSIS — G47.33 OSA (OBSTRUCTIVE SLEEP APNEA): ICD-10-CM

## 2021-02-12 DIAGNOSIS — M10.9 GOUT, UNSPECIFIED CAUSE, UNSPECIFIED CHRONICITY, UNSPECIFIED SITE: ICD-10-CM

## 2021-02-12 DIAGNOSIS — F32.0 CURRENT MILD EPISODE OF MAJOR DEPRESSIVE DISORDER, UNSPECIFIED WHETHER RECURRENT (HCC): Chronic | ICD-10-CM

## 2021-02-12 DIAGNOSIS — N18.30 CHRONIC RENAL IMPAIRMENT, STAGE 3 (MODERATE), UNSPECIFIED WHETHER STAGE 3A OR 3B CKD (HCC): ICD-10-CM

## 2021-02-12 PROCEDURE — 99214 OFFICE O/P EST MOD 30 MIN: CPT | Performed by: INTERNAL MEDICINE

## 2021-02-12 RX ORDER — GABAPENTIN 100 MG/1
100 CAPSULE ORAL DAILY
Qty: 90 CAPSULE | Refills: 1 | Status: CANCELLED | OUTPATIENT
Start: 2021-02-12

## 2021-02-12 RX ORDER — DICYCLOMINE HYDROCHLORIDE 10 MG/1
10 CAPSULE ORAL 3 TIMES DAILY
Qty: 270 CAPSULE | Refills: 3 | Status: SHIPPED | OUTPATIENT
Start: 2021-02-12 | End: 2022-03-16 | Stop reason: SDUPTHER

## 2021-02-12 RX ORDER — POTASSIUM CHLORIDE 750 MG/1
10 TABLET, FILM COATED, EXTENDED RELEASE ORAL 2 TIMES DAILY
Qty: 180 TABLET | Refills: 3 | Status: SHIPPED | OUTPATIENT
Start: 2021-02-12 | End: 2021-04-26

## 2021-02-12 RX ORDER — PREDNISONE 10 MG/1
10 TABLET ORAL DAILY
Qty: 30 TABLET | Refills: 5 | Status: SHIPPED | OUTPATIENT
Start: 2021-02-12 | End: 2021-02-15 | Stop reason: SDUPTHER

## 2021-02-12 RX ORDER — ALLOPURINOL 100 MG/1
100 TABLET ORAL DAILY
Qty: 90 TABLET | Refills: 3 | Status: SHIPPED | OUTPATIENT
Start: 2021-02-12 | End: 2022-04-06 | Stop reason: SDUPTHER

## 2021-02-12 RX ORDER — BUPROPION HYDROCHLORIDE 150 MG/1
150 TABLET, EXTENDED RELEASE ORAL 2 TIMES DAILY
Qty: 180 TABLET | Refills: 3 | Status: SHIPPED | OUTPATIENT
Start: 2021-02-12 | End: 2022-02-09 | Stop reason: SDUPTHER

## 2021-02-12 NOTE — PROGRESS NOTES
Subjective     Patient ID: Raquel Mariee is a 65 y.o. female. Patient is here for management of multiple medical problems.     Chief Complaint   Patient presents with   • Cough     History of Present Illness     Cough.    Still coughing and tinks asthma.    On DM cough meds.    barry had titration study.  Still with no cpap machine.            The following portions of the patient's history were reviewed and updated as appropriate: allergies, current medications, past family history, past medical history, past social history, past surgical history and problem list.    Review of Systems   Respiratory: Positive for cough and shortness of breath.    Psychiatric/Behavioral: Negative for sleep disturbance.   All other systems reviewed and are negative.      Current Outpatient Medications:   •  acetaminophen-codeine (TYLENOL #3) 300-30 MG per tablet, Take 1 tablet by mouth every 4-6 hours as needed for pain., Disp: 12 tablet, Rfl: 0  •  albuterol sulfate  (90 Base) MCG/ACT inhaler, Inhale 2 puffs by mouth every 4-6 hours as needed for cough, wheeze, shortness of breath. Use with vortex spacer, Disp: 18 g, Rfl: 3  •  allopurinol (Zyloprim) 100 MG tablet, Take 1 tablet by mouth Daily., Disp: 90 tablet, Rfl: 3  •  amitriptyline (ELAVIL) 10 MG tablet, Take 10 mg by mouth 2 (Two) Times a Day., Disp: , Rfl:   •  amitriptyline (ELAVIL) 10 MG tablet, Take 2 tablets by mouth Every Evening., Disp: 90 tablet, Rfl: 3  •  Azelastine HCl 137 MCG/SPRAY solution, Instill 1-2 sprays in each nostril twice a day as needed, Disp: 30 mL, Rfl: 11  •  azithromycin (Zithromax) 250 MG tablet, Take 4 tablets by mouth on day of dental surgery., Disp: 12 tablet, Rfl: 0  •  buPROPion SR (WELLBUTRIN SR) 150 MG 12 hr tablet, Take 1 tablet by mouth 2 (two) times a day., Disp: 180 tablet, Rfl: 3  •  calcium carbonate (Antacid Maximum) 1000 MG chewable tablet, Chew 500 mg 3 (Three) Times a Day., Disp: , Rfl:   •  CBD (cannabidiol) oral oil, Take   by mouth 2 (Two) Times a Day., Disp: , Rfl:   •  chlorhexidine (PERIDEX) 0.12 % solution, Swish 15 mL around mouth for 30 seconds, then spit out. Use twice daily as directed. , Disp: 473 mL, Rfl: 0  •  cholecalciferol (VITAMIN D3) 25 MCG (1000 UT) tablet, Take 1,000 Units by mouth Daily., Disp: , Rfl:   •  clonazePAM (KlonoPIN) 0.5 MG tablet, Take 1 tablet by mouth At Night As Needed, Disp: 30 tablet, Rfl: 1  •  Dextromethorphan-guaiFENesin (Mucus-DM)  MG tablet sustained-release 12 hour, Take  by mouth., Disp: , Rfl:   •  dicyclomine (BENTYL) 10 MG capsule, Take 1 capsule by mouth 3 (Three) Times a Day., Disp: 270 capsule, Rfl: 3  •  diphenhydrAMINE (Benadryl Allergy) 25 MG tablet, Take 1-2 tablets by mouth Every 4-6 Hours As Needed., Disp: 90 tablet, Rfl: 11  •  EPINEPHrine (EPIPEN) 0.3 MG/0.3ML solution auto-injector injection, Use as directed for acute allergic reaction, Disp: 2 each, Rfl: 3  •  famotidine (PEPCID) 20 MG tablet, Take 20 mg by mouth 2 (Two) Times a Day., Disp: , Rfl:   •  ferrous sulfate 325 (65 Fe) MG tablet, Take  by mouth., Disp: , Rfl:   •  fexofenadine (ALLEGRA) 180 MG tablet, Take 180 mg by mouth Daily., Disp: , Rfl:   •  fluticasone (FLONASE) 50 MCG/ACT nasal spray, Instill 2 sprays into each nostril daily., Disp: 16 g, Rfl: 11  •  fluticasone (FLOVENT DISKUS) 50 MCG/BLIST diskus inhaler, Inhale 1 puff 2 (Two) Times a Day., Disp: , Rfl:   •  fluticasone-salmeterol (Advair HFA) 230-21 MCG/ACT inhaler, Inhale 2 puffs by mouth 2 (Two) Times a Day., Disp: 12 g, Rfl: 11  •  fluticasone-salmeterol (ADVAIR) 500-50 MCG/DOSE DISKUS, Inhale 1 puff., Disp: , Rfl:   •  furosemide (LASIX) 40 MG tablet, Take 40 mg by mouth 2 (Two) Times a Day., Disp: , Rfl:   •  gabapentin (NEURONTIN) 100 MG capsule, Take 100 mg by mouth Daily., Disp: , Rfl:   •  glucose-vitamin C (Meijer Glucose) 4-6 GM-MG chewable tablet chewable tablet, Chew., Disp: , Rfl:   •  guaiFENesin-codeine (Virtussin A/C) 100-10 MG/5ML  liquid, Take 5 mL by mouth 3 (Three) Times a Day As Needed for Cough., Disp: , Rfl:   •  hydrOXYzine pamoate (VISTARIL) 50 MG capsule, Take 50 mg by mouth Every Night., Disp: , Rfl:   •  ipratropium-albuterol (DUO-NEB) 0.5-2.5 mg/3 ml nebulizer, Inhale contents of 1 vial (3mL) via nebulizer every 4-6 hours as needed for coughing, wheezing, or shortness of breath., Disp: 300 mL, Rfl: 3  •  isosorbide dinitrate (ISORDIL) 10 MG tablet, Take 10 mg by mouth 3 (Three) Times a Day., Disp: , Rfl:   •  levothyroxine (SYNTHROID, LEVOTHROID) 50 MCG tablet, Take 50 mcg by mouth Daily., Disp: , Rfl:   •  levothyroxine (SYNTHROID, LEVOTHROID) 50 MCG tablet, Take 1 tablet by mouth Daily., Disp: 90 tablet, Rfl: 3  •  Magnesium 250 MG tablet, Take 500 mg by mouth Daily. Two tablets, Disp: , Rfl:   •  melatonin 5 MG tablet tablet, Take 5 mg by mouth Every Night., Disp: , Rfl:   •  mepolizumab (NUCALA) 100 MG reconstituted solution injection, Inject 100 mg under the skin into the appropriate area as directed Every 30 (Thirty) Days., Disp: , Rfl:   •  montelukast (SINGULAIR) 10 MG tablet, Take 10 mg by mouth Every Night., Disp: , Rfl:   •  montelukast (SINGULAIR) 10 MG tablet, Take 1 tablet by mouth every night at bedtime., Disp: 30 tablet, Rfl: 11  •  multivitamin with minerals (ONE-A-DAY PROACTIVE 65+ PO), Take 1 tablet by mouth Daily., Disp: , Rfl:   •  omeprazole (priLOSEC) 40 MG capsule, Take 40 mg by mouth 2 (two) times a day., Disp: , Rfl:   •  ondansetron (ZOFRAN) 4 MG tablet, Take 4 mg by mouth Every 8 (Eight) Hours As Needed for Nausea or Vomiting., Disp: , Rfl:   •  potassium chloride 10 MEQ CR tablet, Take 1 tablet by mouth 2 (Two) Times a Day., Disp: 180 tablet, Rfl: 3  •  potassium citrate (UROCIT-K) 10 MEQ (1080 MG) CR tablet, Take 10 mEq by mouth Daily., Disp: , Rfl:   •  predniSONE (DELTASONE) 10 MG tablet, Take 1 tablet by mouth Daily., Disp: 30 tablet, Rfl: 5  •  promethazine-dextromethorphan (PROMETHAZINE-DM)  "6.25-15 MG/5ML syrup, Take 5 mL by mouth 4 (Four) Times a Day As Needed for Cough., Disp: 240 mL, Rfl: 0  •  Spacer/Aero-Holding Chambers (AeroChamber Plus Wali-Vu) misc, Use as directed with albuterol inhaler, Disp: 1 each, Rfl: 0  •  tiotropium (SPIRIVA) 18 MCG per inhalation capsule, Place 1 capsule into inhaler and inhale Daily., Disp: , Rfl:   •  tiotropium bromide monohydrate (SPIRIVA RESPIMAT) 2.5 MCG/ACT aerosol solution inhaler, Inhale 2 puffs by mouth daily, Disp: 4 g, Rfl: 11  •  tiZANidine (ZANAFLEX) 2 MG tablet, Take 1 tablet by mouth Every Night., Disp: 90 tablet, Rfl: 1  •  ubrogepant (ubrogepant) 100 MG tablet, Take 1 tablet by mouth once per day as needed at onset of migraine, Disp: 10 tablet, Rfl: 11  •  valsartan (DIOVAN) 160 MG tablet, Take 1 tablet by mouth Daily., Disp: 90 tablet, Rfl: 3  •  vitamin B-12 (CYANOCOBALAMIN) 1000 MCG tablet, Take 1,000 mcg by mouth 2 (two) times a day., Disp: , Rfl:   •  Zoster Vac Recomb Adjuvanted 50 MCG/0.5ML reconstituted suspension, Inject as directed per protocol, Disp: 1 each, Rfl: 0    Current Facility-Administered Medications:   •  heparin injection 500 Units, 5 mL, Intravenous, PRN, Sanjeev Rawls MD  •  sodium chloride 0.9 % flush 10 mL, 10 mL, Intravenous, Q12H, Sanjeev Rawls MD  •  sodium chloride 0.9 % flush 10 mL, 10 mL, Intravenous, PRN, Sanjeev Rawls MD  •  sodium chloride 0.9 % flush 20 mL, 20 mL, Intravenous, PRN, Sanjeev Rawls MD    Objective      Blood pressure 118/72, pulse 100, temperature 98 °F (36.7 °C), resp. rate 17, height 165.1 cm (65\"), weight 112 kg (248 lb), SpO2 96 %.    Physical Exam     General Appearance:    Alert, cooperative, no distress, appears stated age   Head:    Normocephalic, without obvious abnormality, atraumatic   Eyes:    PERRL, conjunctiva/corneas clear, EOM's intact   Ears:    Normal TM's and external ear canals, both ears   Nose:   Nares normal, septum midline, mucosa normal, no drainage   or sinus " tenderness   Throat:   Lips, mucosa, and tongue normal; teeth and gums normal   Neck:   Supple, symmetrical, trachea midline, no adenopathy;        thyroid:  No enlargement/tenderness/nodules; no carotid    bruit or JVD   Back:     Symmetric, no curvature, ROM normal, no CVA tenderness   Lungs:     Clear to auscultation bilaterally, respirations unlabored   Chest wall:    No tenderness or deformity   Heart:    Regular rate and rhythm, S1 and S2 normal, 3/6 murmur,        rub or gallop   Abdomen:     Soft, non-tender, bowel sounds active all four quadrants,     no masses, no organomegaly   Extremities:   Extremities normal, atraumatic, no cyanosis or edema   Pulses:   2+ and symmetric all extremities   Skin:   Skin color, texture, turgor normal, no rashes or lesions   Lymph nodes:   Cervical, supraclavicular, and axillary nodes normal   Neurologic:   CNII-XII intact. Normal strength, sensation and reflexes       throughout      Results for orders placed or performed in visit on 02/05/21   COVID-19 PCR, [facility], NP SWAB IN [facility] 24-30 HR TAT - Swab, Nasopharynx    Specimen: Nasopharynx; Swab   Result Value Ref Range    SARS-CoV-2 ELDA Not Detected Not Detected         Assessment/Plan   Cri 1.8-->1.5--> 1.3      Less [id number] at night. Will stop.      Klonopin 0.25mg reduced and doing better    allopurinol helping with arthritis. Toes better    On less lasix now.  Doing better.    Polymyositis.  Pt on prednisone 15 and toleration pain. 10mg didn't work.    Pt getting murmur evaluated with Dr Acuna. Getting bubble for eval of vsd.          Diagnoses and all orders for this visit:    1. Polymyositis (CMS/HCC) (Primary)    2. Current mild episode of major depressive disorder, unspecified whether recurrent (CMS/HCC)  -     buPROPion SR (WELLBUTRIN SR) 150 MG 12 hr tablet; Take 1 tablet by mouth 2 (two) times a day.  Dispense: 180 tablet; Refill: 3    3. KALYN (obstructive sleep apnea)    4.  Localized edema    5. Gout, unspecified cause, unspecified chronicity, unspecified site    6. Chronic renal impairment, stage 3 (moderate), unspecified whether stage 3a or 3b CKD (CMS/HCC)    Other orders  -     Cancel: gabapentin (NEURONTIN) 100 MG capsule; Take 1 capsule by mouth Daily.  Dispense: 90 capsule; Refill: 1  -     potassium chloride 10 MEQ CR tablet; Take 1 tablet by mouth 2 (Two) Times a Day.  Dispense: 180 tablet; Refill: 3  -     dicyclomine (BENTYL) 10 MG capsule; Take 1 capsule by mouth 3 (Three) Times a Day.  Dispense: 270 capsule; Refill: 3  -     predniSONE (DELTASONE) 10 MG tablet; Take 1 tablet by mouth Daily.  Dispense: 30 tablet; Refill: 5  -     allopurinol (Zyloprim) 100 MG tablet; Take 1 tablet by mouth Daily.  Dispense: 90 tablet; Refill: 3      Return in about 6 weeks (around 3/26/2021).          There are no Patient Instructions on file for this visit.     Sanjeev Rawls MD    Assessment/Plan

## 2021-02-15 DIAGNOSIS — N18.2 CHRONIC RENAL IMPAIRMENT, STAGE 2 (MILD): Primary | ICD-10-CM

## 2021-02-15 RX ORDER — PREDNISONE 10 MG/1
15 TABLET ORAL DAILY
Qty: 45 TABLET | Refills: 5 | Status: SHIPPED | OUTPATIENT
Start: 2021-02-15 | End: 2021-04-26

## 2021-02-23 ENCOUNTER — PATIENT MESSAGE (OUTPATIENT)
Dept: INTERNAL MEDICINE | Facility: CLINIC | Age: 66
End: 2021-02-23

## 2021-02-23 RX ORDER — POTASSIUM CITRATE 10 MEQ/1
10 TABLET, EXTENDED RELEASE ORAL DAILY
Qty: 90 TABLET | Refills: 3 | Status: SHIPPED | OUTPATIENT
Start: 2021-02-23 | End: 2021-04-26

## 2021-02-23 RX ORDER — OMEPRAZOLE 40 MG/1
40 CAPSULE, DELAYED RELEASE ORAL 2 TIMES DAILY
Qty: 90 CAPSULE | Refills: 3 | Status: SHIPPED | OUTPATIENT
Start: 2021-02-23 | End: 2021-06-01 | Stop reason: SDUPTHER

## 2021-02-23 NOTE — TELEPHONE ENCOUNTER
From: Raquel Mariee  To: Sanjeev Rawls MD  Sent: 2/23/2021 11:38 AM EST  Subject: Prescription Question    Need refills to [facility] pharmacy for potassium citrate 10 meq. QD and omeprazole 40 mg. BID. Thank you Lilian

## 2021-02-24 ENCOUNTER — HOSPITAL ENCOUNTER (OUTPATIENT)
Dept: INFUSION THERAPY | Facility: HOSPITAL | Age: 66
Discharge: HOME | End: 2021-02-24
Admitting: ALLERGY & IMMUNOLOGY

## 2021-02-24 VITALS
BODY MASS INDEX: 41.65 KG/M2 | OXYGEN SATURATION: 95 % | WEIGHT: 250 LBS | SYSTOLIC BLOOD PRESSURE: 121 MMHG | RESPIRATION RATE: 16 BRPM | HEART RATE: 104 BPM | DIASTOLIC BLOOD PRESSURE: 70 MMHG | TEMPERATURE: 98.2 F | HEIGHT: 65 IN

## 2021-02-24 DIAGNOSIS — Z95.828 PORT-A-CATH IN PLACE: Primary | ICD-10-CM

## 2021-02-24 LAB
ALBUMIN SERPL-MCNC: 4.4 G/DL (ref 3.5–5.2)
ALBUMIN/GLOB SERPL: 2.4 G/DL
ALP SERPL-CCNC: 77 U/L (ref 39–117)
ALT SERPL W P-5'-P-CCNC: 13 U/L (ref 1–33)
ANION GAP SERPL CALCULATED.3IONS-SCNC: 13 MMOL/L (ref 5–15)
AST SERPL-CCNC: 21 U/L (ref 1–32)
BASOPHILS # BLD AUTO: 0.06 10*3/MM3 (ref 0–0.2)
BASOPHILS NFR BLD AUTO: 0.8 % (ref 0–1.5)
BILIRUB SERPL-MCNC: 0.5 MG/DL (ref 0–1.2)
BUN SERPL-MCNC: 15 MG/DL (ref 8–23)
BUN/CREAT SERPL: 8 (ref 7–25)
CALCIUM SPEC-SCNC: 9.3 MG/DL (ref 8.6–10.5)
CHLORIDE SERPL-SCNC: 94 MMOL/L (ref 98–107)
CO2 SERPL-SCNC: 23 MMOL/L (ref 22–29)
CREAT SERPL-MCNC: 1.88 MG/DL (ref 0.57–1)
DEPRECATED RDW RBC AUTO: 45.1 FL (ref 37–54)
EOSINOPHIL # BLD AUTO: 0 10*3/MM3 (ref 0–0.4)
EOSINOPHIL NFR BLD AUTO: 0 % (ref 0.3–6.2)
ERYTHROCYTE [DISTWIDTH] IN BLOOD BY AUTOMATED COUNT: 13.2 % (ref 12.3–15.4)
GFR SERPL CREATININE-BSD FRML MDRD: 27 ML/MIN/1.73
GLOBULIN UR ELPH-MCNC: 1.8 GM/DL
GLUCOSE SERPL-MCNC: 303 MG/DL (ref 65–99)
HCT VFR BLD AUTO: 39.9 % (ref 34–46.6)
HGB BLD-MCNC: 13.1 G/DL (ref 12–15.9)
IGG1 SER-MCNC: 631 MG/DL (ref 700–1600)
IMM GRANULOCYTES # BLD AUTO: 0.07 10*3/MM3 (ref 0–0.05)
IMM GRANULOCYTES NFR BLD AUTO: 0.9 % (ref 0–0.5)
LYMPHOCYTES # BLD AUTO: 0.73 10*3/MM3 (ref 0.7–3.1)
LYMPHOCYTES NFR BLD AUTO: 9.5 % (ref 19.6–45.3)
MCH RBC QN AUTO: 30.7 PG (ref 26.6–33)
MCHC RBC AUTO-ENTMCNC: 32.8 G/DL (ref 31.5–35.7)
MCV RBC AUTO: 93.4 FL (ref 79–97)
MONOCYTES # BLD AUTO: 0.27 10*3/MM3 (ref 0.1–0.9)
MONOCYTES NFR BLD AUTO: 3.5 % (ref 5–12)
NEUTROPHILS NFR BLD AUTO: 6.55 10*3/MM3 (ref 1.7–7)
NEUTROPHILS NFR BLD AUTO: 85.3 % (ref 42.7–76)
NRBC BLD AUTO-RTO: 0 /100 WBC (ref 0–0.2)
PLATELET # BLD AUTO: 295 10*3/MM3 (ref 140–450)
PMV BLD AUTO: 9.1 FL (ref 6–12)
POTASSIUM SERPL-SCNC: 4.6 MMOL/L (ref 3.5–5.2)
PROT SERPL-MCNC: 6.2 G/DL (ref 6–8.5)
RBC # BLD AUTO: 4.27 10*6/MM3 (ref 3.77–5.28)
SODIUM SERPL-SCNC: 130 MMOL/L (ref 136–145)
WBC # BLD AUTO: 7.68 10*3/MM3 (ref 3.4–10.8)

## 2021-02-24 PROCEDURE — 36591 DRAW BLOOD OFF VENOUS DEVICE: CPT

## 2021-02-24 PROCEDURE — 82784 ASSAY IGA/IGD/IGG/IGM EACH: CPT | Performed by: ALLERGY & IMMUNOLOGY

## 2021-02-24 PROCEDURE — 85025 COMPLETE CBC W/AUTO DIFF WBC: CPT | Performed by: ALLERGY & IMMUNOLOGY

## 2021-02-24 PROCEDURE — 80053 COMPREHEN METABOLIC PANEL: CPT | Performed by: ALLERGY & IMMUNOLOGY

## 2021-02-24 PROCEDURE — 25010000003 HEPARIN LOCK FLUSH PER 10 UNITS: Performed by: INTERNAL MEDICINE

## 2021-02-24 RX ORDER — HEPARIN SODIUM (PORCINE) LOCK FLUSH IV SOLN 100 UNIT/ML 100 UNIT/ML
500 SOLUTION INTRAVENOUS AS NEEDED
Status: DISCONTINUED | OUTPATIENT
Start: 2021-02-24 | End: 2021-02-26 | Stop reason: HOSPADM

## 2021-02-24 RX ORDER — HEPARIN SODIUM (PORCINE) LOCK FLUSH IV SOLN 100 UNIT/ML 100 UNIT/ML
500 SOLUTION INTRAVENOUS AS NEEDED
Status: CANCELLED | OUTPATIENT
Start: 2021-02-24

## 2021-02-24 RX ORDER — SODIUM CHLORIDE 0.9 % (FLUSH) 0.9 %
10 SYRINGE (ML) INJECTION AS NEEDED
Status: CANCELLED | OUTPATIENT
Start: 2021-02-24

## 2021-02-24 RX ORDER — MULTIVIT WITH MINERALS/LUTEIN
250 TABLET ORAL DAILY
COMMUNITY
End: 2021-04-27

## 2021-02-24 RX ORDER — SODIUM CHLORIDE 0.9 % (FLUSH) 0.9 %
10 SYRINGE (ML) INJECTION AS NEEDED
Status: DISCONTINUED | OUTPATIENT
Start: 2021-02-24 | End: 2021-02-26 | Stop reason: HOSPADM

## 2021-02-24 RX ADMIN — SODIUM CHLORIDE, PRESERVATIVE FREE 10 ML: 5 INJECTION INTRAVENOUS at 14:13

## 2021-02-24 RX ADMIN — HEPARIN 500 UNITS: 100 SYRINGE at 14:13

## 2021-02-25 DIAGNOSIS — N17.9 ACUTE RENAL FAILURE SUPERIMPOSED ON CHRONIC KIDNEY DISEASE, UNSPECIFIED CKD STAGE, UNSPECIFIED ACUTE RENAL FAILURE TYPE (HCC): ICD-10-CM

## 2021-02-25 DIAGNOSIS — D80.3 IGG DEFICIENCY (HCC): Primary | ICD-10-CM

## 2021-02-25 DIAGNOSIS — N18.9 ACUTE RENAL FAILURE SUPERIMPOSED ON CHRONIC KIDNEY DISEASE, UNSPECIFIED CKD STAGE, UNSPECIFIED ACUTE RENAL FAILURE TYPE (HCC): ICD-10-CM

## 2021-02-26 ENCOUNTER — APPOINTMENT (OUTPATIENT)
Dept: INFUSION THERAPY | Facility: HOSPITAL | Age: 66
End: 2021-02-26

## 2021-02-26 ENCOUNTER — TELEPHONE (OUTPATIENT)
Dept: CARDIOLOGY | Facility: CLINIC | Age: 66
End: 2021-02-26

## 2021-02-26 RX ORDER — ISOSORBIDE DINITRATE 10 MG/1
10 TABLET ORAL 3 TIMES DAILY
Qty: 270 TABLET | Refills: 3 | Status: SHIPPED | OUTPATIENT
Start: 2021-02-26 | End: 2022-02-15 | Stop reason: ALTCHOICE

## 2021-02-26 NOTE — TELEPHONE ENCOUNTER
isosorbide dinitrate (ISORDIL) 10 MG tablet  Needs to go to [facility].

## 2021-03-02 LAB
BUN SERPL-MCNC: 18 MG/DL (ref 8–23)
BUN/CREAT SERPL: 11.6 (ref 7–25)
CALCIUM SERPL-MCNC: 9.6 MG/DL (ref 8.6–10.5)
CHLORIDE SERPL-SCNC: 97 MMOL/L (ref 98–107)
CK SERPL-CCNC: 125 U/L (ref 20–180)
CO2 SERPL-SCNC: 30 MMOL/L (ref 22–29)
CREAT SERPL-MCNC: 1.55 MG/DL (ref 0.57–1)
GLUCOSE SERPL-MCNC: 154 MG/DL (ref 65–99)
IGA SERPL-MCNC: 193 MG/DL (ref 87–352)
IGG SERPL-MCNC: 628 MG/DL (ref 586–1602)
IGM SERPL-MCNC: 34 MG/DL (ref 26–217)
MYOGLOBIN SERPL-MCNC: 66.5 NG/ML (ref 25–58)
POTASSIUM SERPL-SCNC: 4.5 MMOL/L (ref 3.5–5.2)
SODIUM SERPL-SCNC: 137 MMOL/L (ref 136–145)

## 2021-03-12 ENCOUNTER — OFFICE VISIT (OUTPATIENT)
Dept: INTERNAL MEDICINE | Facility: CLINIC | Age: 66
End: 2021-03-12

## 2021-03-12 ENCOUNTER — TELEPHONE (OUTPATIENT)
Dept: INTERNAL MEDICINE | Facility: CLINIC | Age: 66
End: 2021-03-12

## 2021-03-12 VITALS
OXYGEN SATURATION: 94 % | BODY MASS INDEX: 42.15 KG/M2 | RESPIRATION RATE: 16 BRPM | DIASTOLIC BLOOD PRESSURE: 72 MMHG | WEIGHT: 253 LBS | HEART RATE: 104 BPM | SYSTOLIC BLOOD PRESSURE: 124 MMHG | HEIGHT: 65 IN | TEMPERATURE: 97.3 F

## 2021-03-12 DIAGNOSIS — N18.30 CHRONIC RENAL IMPAIRMENT, STAGE 3 (MODERATE), UNSPECIFIED WHETHER STAGE 3A OR 3B CKD (HCC): ICD-10-CM

## 2021-03-12 DIAGNOSIS — G72.9 POLYMYOPATHY: Primary | ICD-10-CM

## 2021-03-12 DIAGNOSIS — J30.1 ALLERGIC RHINITIS DUE TO POLLEN, UNSPECIFIED SEASONALITY: ICD-10-CM

## 2021-03-12 PROCEDURE — 99214 OFFICE O/P EST MOD 30 MIN: CPT | Performed by: INTERNAL MEDICINE

## 2021-03-12 RX ORDER — FOLIC ACID 1 MG/1
1 TABLET ORAL DAILY
Qty: 90 TABLET | Refills: 3 | Status: SHIPPED | OUTPATIENT
Start: 2021-03-12 | End: 2022-01-17 | Stop reason: ALTCHOICE

## 2021-03-12 RX ORDER — IPRATROPIUM BROMIDE 42 UG/1
2 SPRAY, METERED NASAL
Qty: 15 ML | Refills: 12 | Status: SHIPPED | OUTPATIENT
Start: 2021-03-12 | End: 2022-04-06 | Stop reason: SDUPTHER

## 2021-03-12 NOTE — TELEPHONE ENCOUNTER
[facility] called regarding patient's medication of methotrexate, they states the dosage needs clarification on it.

## 2021-03-12 NOTE — PROGRESS NOTES
Subjective     Patient ID: Raquel Mariee is a 65 y.o. female. Patient is here for management of multiple medical problems.     Chief Complaint   Patient presents with   • Sleep Apnea   • Depression   • Chronic Kidney Disease     History of Present Illness   polymyositis still on pred 15 mg  Was at 10mg and enzymes went up.    Had covid vac after the labs.        The following portions of the patient's history were reviewed and updated as appropriate: allergies, current medications, past family history, past medical history, past social history, past surgical history and problem list.    Review of Systems   Constitutional: Negative for diaphoresis and fatigue.   Respiratory: Negative for shortness of breath.    Psychiatric/Behavioral: Negative for self-injury. The patient is not nervous/anxious and is not hyperactive.    All other systems reviewed and are negative.      Current Outpatient Medications:   •  acetaminophen-codeine (TYLENOL #3) 300-30 MG per tablet, Take 1 tablet by mouth every 4-6 hours as needed for pain., Disp: 12 tablet, Rfl: 0  •  albuterol sulfate  (90 Base) MCG/ACT inhaler, Inhale 2 puffs by mouth every 4-6 hours as needed for cough, wheeze, shortness of breath. Use with vortex spacer, Disp: 18 g, Rfl: 3  •  allopurinol (Zyloprim) 100 MG tablet, Take 1 tablet by mouth Daily., Disp: 90 tablet, Rfl: 3  •  amitriptyline (ELAVIL) 10 MG tablet, Take 10 mg by mouth 2 (Two) Times a Day., Disp: , Rfl:   •  amitriptyline (ELAVIL) 10 MG tablet, Take 2 tablets by mouth Every Evening., Disp: 90 tablet, Rfl: 3  •  Azelastine HCl 137 MCG/SPRAY solution, Instill 1-2 sprays in each nostril twice a day as needed, Disp: 30 mL, Rfl: 11  •  azithromycin (Zithromax) 250 MG tablet, Take 4 tablets by mouth on day of dental surgery., Disp: 12 tablet, Rfl: 0  •  buPROPion SR (WELLBUTRIN SR) 150 MG 12 hr tablet, Take 1 tablet by mouth 2 (two) times a day., Disp: 180 tablet, Rfl: 3  •  calcium carbonate (Antacid  Maximum) 1000 MG chewable tablet, Chew 500 mg 3 (Three) Times a Day., Disp: , Rfl:   •  CBD (cannabidiol) oral oil, Take  by mouth 2 (Two) Times a Day., Disp: , Rfl:   •  chlorhexidine (PERIDEX) 0.12 % solution, Swish 15 mL around mouth for 30 seconds, then spit out. Use twice daily as directed. , Disp: 473 mL, Rfl: 0  •  cholecalciferol (VITAMIN D3) 25 MCG (1000 UT) tablet, Take 1,000 Units by mouth Daily., Disp: , Rfl:   •  clonazePAM (KlonoPIN) 0.5 MG tablet, Take 1 tablet by mouth At Night As Needed, Disp: 30 tablet, Rfl: 1  •  Dextromethorphan-guaiFENesin (Mucus-DM)  MG tablet sustained-release 12 hour, Take  by mouth., Disp: , Rfl:   •  dicyclomine (BENTYL) 10 MG capsule, Take 1 capsule by mouth 3 (Three) Times a Day., Disp: 270 capsule, Rfl: 3  •  diphenhydrAMINE (Benadryl Allergy) 25 MG tablet, Take 1-2 tablets by mouth Every 4-6 Hours As Needed., Disp: 90 tablet, Rfl: 11  •  EPINEPHrine (EPIPEN) 0.3 MG/0.3ML solution auto-injector injection, Use as directed for acute allergic reaction, Disp: 2 each, Rfl: 3  •  famotidine (PEPCID) 20 MG tablet, Take 20 mg by mouth 2 (Two) Times a Day., Disp: , Rfl:   •  ferrous sulfate 325 (65 Fe) MG tablet, Take  by mouth., Disp: , Rfl:   •  fexofenadine (ALLEGRA) 180 MG tablet, Take 180 mg by mouth Daily., Disp: , Rfl:   •  fluticasone (FLONASE) 50 MCG/ACT nasal spray, Instill 2 sprays into each nostril daily., Disp: 16 g, Rfl: 11  •  fluticasone (FLOVENT DISKUS) 50 MCG/BLIST diskus inhaler, Inhale 1 puff 2 (Two) Times a Day., Disp: , Rfl:   •  fluticasone-salmeterol (Advair HFA) 230-21 MCG/ACT inhaler, Inhale 2 puffs by mouth 2 (Two) Times a Day., Disp: 12 g, Rfl: 11  •  fluticasone-salmeterol (ADVAIR) 500-50 MCG/DOSE DISKUS, Inhale 1 puff., Disp: , Rfl:   •  furosemide (LASIX) 40 MG tablet, Take 40 mg by mouth 2 (Two) Times a Day., Disp: , Rfl:   •  gabapentin (NEURONTIN) 100 MG capsule, Take 100 mg by mouth Daily., Disp: , Rfl:   •  glucose-vitamin C (Meijer  Glucose) 4-6 GM-MG chewable tablet chewable tablet, Chew., Disp: , Rfl:   •  guaiFENesin-codeine (Virtussin A/C) 100-10 MG/5ML liquid, Take 5 mL by mouth 3 (Three) Times a Day As Needed for Cough., Disp: , Rfl:   •  hydrOXYzine pamoate (VISTARIL) 50 MG capsule, Take 50 mg by mouth Every Night., Disp: , Rfl:   •  ipratropium-albuterol (DUO-NEB) 0.5-2.5 mg/3 ml nebulizer, Inhale contents of 1 vial (3mL) via nebulizer every 4-6 hours as needed for coughing, wheezing, or shortness of breath., Disp: 300 mL, Rfl: 3  •  isosorbide dinitrate (ISORDIL) 10 MG tablet, Take 1 tablet by mouth 3 (Three) Times a Day., Disp: 270 tablet, Rfl: 3  •  levothyroxine (SYNTHROID, LEVOTHROID) 50 MCG tablet, Take 50 mcg by mouth Daily., Disp: , Rfl:   •  levothyroxine (SYNTHROID, LEVOTHROID) 50 MCG tablet, Take 1 tablet by mouth Daily., Disp: 90 tablet, Rfl: 3  •  Magnesium 250 MG tablet, Take 500 mg by mouth Daily. Two tablets, Disp: , Rfl:   •  melatonin 5 MG tablet tablet, Take 5 mg by mouth Every Night., Disp: , Rfl:   •  mepolizumab (NUCALA) 100 MG reconstituted solution injection, Inject 100 mg under the skin into the appropriate area as directed Every 30 (Thirty) Days., Disp: , Rfl:   •  montelukast (SINGULAIR) 10 MG tablet, Take 10 mg by mouth Every Night., Disp: , Rfl:   •  montelukast (SINGULAIR) 10 MG tablet, Take 1 tablet by mouth every night at bedtime., Disp: 30 tablet, Rfl: 11  •  multivitamin with minerals (ONE-A-DAY PROACTIVE 65+ PO), Take 1 tablet by mouth Daily., Disp: , Rfl:   •  omeprazole (priLOSEC) 40 MG capsule, Take 1 capsule by mouth 2 (two) times a day., Disp: 90 capsule, Rfl: 3  •  ondansetron (ZOFRAN) 4 MG tablet, Take 4 mg by mouth Every 8 (Eight) Hours As Needed for Nausea or Vomiting., Disp: , Rfl:   •  potassium chloride 10 MEQ CR tablet, Take 1 tablet by mouth 2 (Two) Times a Day., Disp: 180 tablet, Rfl: 3  •  potassium citrate (UROCIT-K) 10 MEQ (1080 MG) CR tablet, Take 1 tablet by mouth Daily., Disp: 90  tablet, Rfl: 3  •  predniSONE (DELTASONE) 10 MG tablet, Take 1 and 1/2 tablets by mouth Daily., Disp: 45 tablet, Rfl: 5  •  promethazine-dextromethorphan (PROMETHAZINE-DM) 6.25-15 MG/5ML syrup, Take 5 mL by mouth 4 (Four) Times a Day As Needed for Cough., Disp: 240 mL, Rfl: 0  •  Spacer/Aero-Holding Chambers (AeroChamber Plus Wali-Vu) misc, Use as directed with albuterol inhaler, Disp: 1 each, Rfl: 0  •  tiotropium (SPIRIVA) 18 MCG per inhalation capsule, Place 1 capsule into inhaler and inhale Daily., Disp: , Rfl:   •  tiotropium bromide monohydrate (SPIRIVA RESPIMAT) 2.5 MCG/ACT aerosol solution inhaler, Inhale 2 puffs by mouth daily, Disp: 4 g, Rfl: 11  •  tiZANidine (ZANAFLEX) 2 MG tablet, Take 1 tablet by mouth Every Night., Disp: 90 tablet, Rfl: 1  •  ubrogepant (ubrogepant) 100 MG tablet, Take 1 tablet by mouth once per day as needed at onset of migraine, Disp: 10 tablet, Rfl: 11  •  valsartan (DIOVAN) 160 MG tablet, Take 1 tablet by mouth Daily., Disp: 90 tablet, Rfl: 3  •  vitamin B-12 (CYANOCOBALAMIN) 1000 MCG tablet, Take 1,000 mcg by mouth 2 (two) times a day., Disp: , Rfl:   •  vitamin C (ASCORBIC ACID) 250 MG tablet, Take 250 mg by mouth Daily., Disp: , Rfl:   •  Zoster Vac Recomb Adjuvanted 50 MCG/0.5ML reconstituted suspension, Inject as directed per protocol, Disp: 1 each, Rfl: 0  •  ipratropium (ATROVENT) 0.06 % nasal spray, 2 sprays into the nostril(s) as directed by provider every night at bedtime., Disp: 15 mL, Rfl: 12  •  [START ON 3/15/2021] methotrexate 2.5 MG tablet, Take 2 tablets by mouth 3 (Three) Doses Each Week. Take Doses 12 (Twelve) Hours Apart., Disp: 8 tablet, Rfl: 5    Current Facility-Administered Medications:   •  heparin injection 500 Units, 5 mL, Intravenous, PRN, Geile, Dg, MD  •  sodium chloride 0.9 % flush 10 mL, 10 mL, Intravenous, Q12H, Sanjeev Rawls MD  •  sodium chloride 0.9 % flush 10 mL, 10 mL, Intravenous, PRN, Sanjeev Rawls MD  •  sodium chloride 0.9 %  "flush 20 mL, 20 mL, Intravenous, PRN, Sanjeev Rawls MD    Objective      Blood pressure 124/72, pulse 104, temperature 97.3 °F (36.3 °C), resp. rate 16, height 165.1 cm (65\"), weight 115 kg (253 lb), SpO2 94 %.    Physical Exam     General Appearance:    Alert, cooperative, no distress, appears stated age   Head:    Normocephalic, without obvious abnormality, atraumatic   Eyes:    PERRL, conjunctiva/corneas clear, EOM's intact   Ears:    Normal TM's and external ear canals, both ears   Nose:   Nares normal, septum midline, mucosa normal, no drainage   or sinus tenderness   Throat:   Lips, mucosa, and tongue normal; teeth and gums normal   Neck:   Supple, symmetrical, trachea midline, no adenopathy;        thyroid:  No enlargement/tenderness/nodules; no carotid    bruit or JVD   Back:     Symmetric, no curvature, ROM normal, no CVA tenderness   Lungs:     Clear to auscultation bilaterally, respirations unlabored   Chest wall:    No tenderness or deformity   Heart:    Regular rate and rhythm, S1 and S2 normal, no murmur,        rub or gallop   Abdomen:     Soft, non-tender, bowel sounds active all four quadrants,     no masses, no organomegaly   Extremities:   Extremities normal, atraumatic, no cyanosis or edema   Pulses:   2+ and symmetric all extremities   Skin:   Skin color, texture, turgor normal, no rashes or lesions   Lymph nodes:   Cervical, supraclavicular, and axillary nodes normal   Neurologic:   CNII-XII intact. Normal strength, sensation and reflexes       throughout      Results for orders placed or performed in visit on 02/25/21   IgG    Specimen: Blood   Result Value Ref Range    IgG 628 586 - 1,602 mg/dL   IgM    Specimen: Blood   Result Value Ref Range    IgM 34 26 - 217 mg/dL   IgA    Specimen: Blood   Result Value Ref Range    IgA 193 87 - 352 mg/dL   Basic metabolic panel    Specimen: Blood   Result Value Ref Range    Glucose 154 (H) 65 - 99 mg/dL    BUN 18 8 - 23 mg/dL    Creatinine 1.55 (H) " 0.57 - 1.00 mg/dL    eGFR Non African Am 34 (L) >60 mL/min/1.73    eGFR  Am 41 (L) >60 mL/min/1.73    BUN/Creatinine Ratio 11.6 7.0 - 25.0    Sodium 137 136 - 145 mmol/L    Potassium 4.5 3.5 - 5.2 mmol/L    Chloride 97 (L) 98 - 107 mmol/L    Total CO2 30.0 (H) 22.0 - 29.0 mmol/L    Calcium 9.6 8.6 - 10.5 mg/dL   CK    Specimen: Blood   Result Value Ref Range    Creatine Kinase 125 20 - 180 U/L   Myoglobin, Serum    Specimen: Blood   Result Value Ref Range    Myoglobin 66.5 (H) 25.0 - 58.0 ng/mL         Assessment/Plan     CRI and first started with paxil and anasthia.  On vent and renal failure.      Diagnoses and all orders for this visit:    1. Polymyopathy (Primary)  -     Glomerular Basement Membrane Antibodies  -     ANCA Panel  -     Nuclear Antigen Antibody, IFA  -     CK  -     Myoglobin, Serum  -     methotrexate 2.5 MG tablet; Take 2 tablets by mouth 3 (Three) Doses Each Week. Take Doses 12 (Twelve) Hours Apart.  Dispense: 8 tablet; Refill: 5  -     Basic metabolic panel    2. Chronic renal impairment, stage 3 (moderate), unspecified whether stage 3a or 3b CKD (CMS/HCC)  -     Glomerular Basement Membrane Antibodies  -     ANCA Panel  -     Nuclear Antigen Antibody, IFA  -     CK  -     Myoglobin, Serum  -     methotrexate 2.5 MG tablet; Take 2 tablets by mouth 3 (Three) Doses Each Week. Take Doses 12 (Twelve) Hours Apart.  Dispense: 8 tablet; Refill: 5  -     Basic metabolic panel    3. Allergic rhinitis due to pollen, unspecified seasonality    Other orders  -     ipratropium (ATROVENT) 0.06 % nasal spray; 2 sprays into the nostril(s) as directed by provider every night at bedtime.  Dispense: 15 mL; Refill: 12      Return in about 4 weeks (around 4/9/2021).  Wakes coughing. Will use afin to see if this will help the cough.    Off t#3 and had ha.   Sinus drainge.       Start ipratropium ns          There are no Patient Instructions on file for this visit.     Sanjeev Rawls MD    Assessment/Plan

## 2021-03-19 ENCOUNTER — HOSPITAL ENCOUNTER (OUTPATIENT)
Dept: INFUSION THERAPY | Facility: HOSPITAL | Age: 66
Setting detail: INFUSION SERIES
Discharge: HOME | End: 2021-03-19

## 2021-03-19 VITALS
OXYGEN SATURATION: 96 % | SYSTOLIC BLOOD PRESSURE: 111 MMHG | HEART RATE: 78 BPM | WEIGHT: 250 LBS | RESPIRATION RATE: 18 BRPM | TEMPERATURE: 98.2 F | DIASTOLIC BLOOD PRESSURE: 51 MMHG | BODY MASS INDEX: 41.65 KG/M2 | HEIGHT: 65 IN

## 2021-03-19 DIAGNOSIS — D83.9 COMMON VARIABLE IMMUNODEFICIENCY (HCC): ICD-10-CM

## 2021-03-19 DIAGNOSIS — Z95.828 PORT-A-CATH IN PLACE: Primary | ICD-10-CM

## 2021-03-19 LAB
ANION GAP SERPL CALCULATED.3IONS-SCNC: 8.9 MMOL/L (ref 5–15)
BUN SERPL-MCNC: 17 MG/DL (ref 8–23)
BUN/CREAT SERPL: 12.2 (ref 7–25)
CALCIUM SPEC-SCNC: 9.3 MG/DL (ref 8.6–10.5)
CHLORIDE SERPL-SCNC: 98 MMOL/L (ref 98–107)
CK SERPL-CCNC: 160 U/L (ref 20–180)
CO2 SERPL-SCNC: 28.1 MMOL/L (ref 22–29)
CREAT SERPL-MCNC: 1.39 MG/DL (ref 0.57–1)
GFR SERPL CREATININE-BSD FRML MDRD: 38 ML/MIN/1.73
GLUCOSE SERPL-MCNC: 117 MG/DL (ref 65–99)
MYOGLOBIN SERPL-MCNC: 79.7 NG/ML (ref 25–58)
POTASSIUM SERPL-SCNC: 4.1 MMOL/L (ref 3.5–5.2)
SODIUM SERPL-SCNC: 135 MMOL/L (ref 136–145)

## 2021-03-19 PROCEDURE — 83520 IMMUNOASSAY QUANT NOS NONAB: CPT | Performed by: INTERNAL MEDICINE

## 2021-03-19 PROCEDURE — 83516 IMMUNOASSAY NONANTIBODY: CPT | Performed by: INTERNAL MEDICINE

## 2021-03-19 PROCEDURE — 80048 BASIC METABOLIC PNL TOTAL CA: CPT | Performed by: INTERNAL MEDICINE

## 2021-03-19 PROCEDURE — 86256 FLUORESCENT ANTIBODY TITER: CPT | Performed by: INTERNAL MEDICINE

## 2021-03-19 PROCEDURE — 82550 ASSAY OF CK (CPK): CPT | Performed by: INTERNAL MEDICINE

## 2021-03-19 PROCEDURE — 96365 THER/PROPH/DIAG IV INF INIT: CPT

## 2021-03-19 PROCEDURE — 25010000003 HEPARIN LOCK FLUSH PER 10 UNITS: Performed by: INTERNAL MEDICINE

## 2021-03-19 PROCEDURE — 25010000002 IMMUNE GLOBULIN (HUMAN) 20 GM/200ML SOLUTION: Performed by: ALLERGY & IMMUNOLOGY

## 2021-03-19 PROCEDURE — 86038 ANTINUCLEAR ANTIBODIES: CPT | Performed by: INTERNAL MEDICINE

## 2021-03-19 PROCEDURE — 96366 THER/PROPH/DIAG IV INF ADDON: CPT

## 2021-03-19 PROCEDURE — 83874 ASSAY OF MYOGLOBIN: CPT | Performed by: INTERNAL MEDICINE

## 2021-03-19 RX ORDER — PREDNISONE 20 MG/1
40 TABLET ORAL ONCE
Status: CANCELLED
Start: 2021-04-16 | End: 2021-04-16

## 2021-03-19 RX ORDER — EPINEPHRINE 1 MG/ML
0.3 INJECTION, SOLUTION INTRAMUSCULAR; SUBCUTANEOUS ONCE AS NEEDED
Status: DISCONTINUED | OUTPATIENT
Start: 2021-03-19 | End: 2021-03-21 | Stop reason: HOSPADM

## 2021-03-19 RX ORDER — SODIUM CHLORIDE 0.9 % (FLUSH) 0.9 %
10 SYRINGE (ML) INJECTION AS NEEDED
Status: CANCELLED | OUTPATIENT
Start: 2021-03-19

## 2021-03-19 RX ORDER — METHYLPREDNISOLONE SODIUM SUCCINATE 125 MG/2ML
50 INJECTION, POWDER, LYOPHILIZED, FOR SOLUTION INTRAMUSCULAR; INTRAVENOUS ONCE AS NEEDED
Status: CANCELLED
Start: 2021-04-16

## 2021-03-19 RX ORDER — HEPARIN SODIUM (PORCINE) LOCK FLUSH IV SOLN 100 UNIT/ML 100 UNIT/ML
500 SOLUTION INTRAVENOUS AS NEEDED
Status: CANCELLED | OUTPATIENT
Start: 2021-03-19

## 2021-03-19 RX ORDER — PREDNISONE 20 MG/1
40 TABLET ORAL ONCE AS NEEDED
Status: DISCONTINUED | OUTPATIENT
Start: 2021-03-19 | End: 2021-03-21 | Stop reason: HOSPADM

## 2021-03-19 RX ORDER — DIPHENHYDRAMINE HCL 25 MG
50 CAPSULE ORAL ONCE AS NEEDED
Status: DISCONTINUED | OUTPATIENT
Start: 2021-03-19 | End: 2021-03-21 | Stop reason: HOSPADM

## 2021-03-19 RX ORDER — ACETAMINOPHEN 325 MG/1
325 TABLET ORAL ONCE
Status: DISCONTINUED | OUTPATIENT
Start: 2021-03-19 | End: 2021-03-21 | Stop reason: HOSPADM

## 2021-03-19 RX ORDER — DIPHENHYDRAMINE HCL 25 MG
50 CAPSULE ORAL ONCE
Status: DISCONTINUED | OUTPATIENT
Start: 2021-03-19 | End: 2021-03-21 | Stop reason: HOSPADM

## 2021-03-19 RX ORDER — ACETAMINOPHEN 325 MG/1
325 TABLET ORAL ONCE
Status: CANCELLED | OUTPATIENT
Start: 2021-04-16

## 2021-03-19 RX ORDER — DIPHENHYDRAMINE HCL 25 MG
50 CAPSULE ORAL ONCE
Status: CANCELLED
Start: 2021-04-16 | End: 2021-04-16

## 2021-03-19 RX ORDER — SODIUM CHLORIDE 0.9 % (FLUSH) 0.9 %
10 SYRINGE (ML) INJECTION AS NEEDED
Status: DISCONTINUED | OUTPATIENT
Start: 2021-03-19 | End: 2021-03-21 | Stop reason: HOSPADM

## 2021-03-19 RX ORDER — PREDNISONE 20 MG/1
40 TABLET ORAL ONCE AS NEEDED
Status: CANCELLED
Start: 2021-04-16

## 2021-03-19 RX ORDER — METHYLPREDNISOLONE SODIUM SUCCINATE 125 MG/2ML
50 INJECTION, POWDER, LYOPHILIZED, FOR SOLUTION INTRAMUSCULAR; INTRAVENOUS ONCE AS NEEDED
Status: DISCONTINUED | OUTPATIENT
Start: 2021-03-19 | End: 2021-03-21 | Stop reason: HOSPADM

## 2021-03-19 RX ORDER — DIPHENHYDRAMINE HCL 25 MG
50 CAPSULE ORAL ONCE AS NEEDED
Status: CANCELLED
Start: 2021-04-16

## 2021-03-19 RX ORDER — PREDNISONE 20 MG/1
40 TABLET ORAL ONCE
Status: DISCONTINUED | OUTPATIENT
Start: 2021-03-19 | End: 2021-03-21 | Stop reason: HOSPADM

## 2021-03-19 RX ORDER — EPINEPHRINE 1 MG/ML
0.3 INJECTION, SOLUTION INTRAMUSCULAR; SUBCUTANEOUS ONCE AS NEEDED
Status: CANCELLED
Start: 2021-04-16

## 2021-03-19 RX ORDER — HEPARIN SODIUM (PORCINE) LOCK FLUSH IV SOLN 100 UNIT/ML 100 UNIT/ML
500 SOLUTION INTRAVENOUS AS NEEDED
Status: DISCONTINUED | OUTPATIENT
Start: 2021-03-19 | End: 2021-03-21 | Stop reason: HOSPADM

## 2021-03-19 RX ADMIN — IMMUNE GLOBULIN INFUSION (HUMAN) 20 G: 100 INJECTION, SOLUTION INTRAVENOUS; SUBCUTANEOUS at 09:16

## 2021-03-19 RX ADMIN — IMMUNE GLOBULIN INFUSION (HUMAN) 20 G: 100 INJECTION, SOLUTION INTRAVENOUS; SUBCUTANEOUS at 12:23

## 2021-03-19 RX ADMIN — SODIUM CHLORIDE, PRESERVATIVE FREE 10 ML: 5 INJECTION INTRAVENOUS at 13:24

## 2021-03-19 RX ADMIN — HEPARIN 500 UNITS: 100 SYRINGE at 13:24

## 2021-03-19 NOTE — CODE DOCUMENTATION
office notified of low BP. Advised medical staff that pt was not c/o soa, chest pain, dizziness. Pt states she feels fine other than being drowsy from benadryl.  not in office today. Advised to send to ED as needed.

## 2021-03-19 NOTE — CODE DOCUMENTATION
Pt reports took Valsartin 160mg and Lasix 40mg PO at same time of benadryl 50mg this morning after arriving to [facility].

## 2021-03-22 LAB
ANA SPECKLED TITR SER: ABNORMAL {TITER}
ANA TITR SER IF: POSITIVE {TITER}
C-ANCA TITR SER IF: NORMAL TITER
Lab: ABNORMAL
MYELOPEROXIDASE AB SER IA-ACNC: <9 U/ML (ref 0–9)
P-ANCA ATYPICAL TITR SER IF: NORMAL TITER
P-ANCA TITR SER IF: NORMAL TITER
PROTEINASE3 AB SER IA-ACNC: <3.5 U/ML (ref 0–3.5)

## 2021-03-23 ENCOUNTER — PATIENT MESSAGE (OUTPATIENT)
Dept: INTERNAL MEDICINE | Facility: CLINIC | Age: 66
End: 2021-03-23

## 2021-03-23 LAB — GBM IGG SER-ACNC: 3 UNITS (ref 0–20)

## 2021-03-24 ENCOUNTER — APPOINTMENT (OUTPATIENT)
Dept: INFUSION THERAPY | Facility: HOSPITAL | Age: 66
End: 2021-03-24

## 2021-03-24 ENCOUNTER — TELEPHONE (OUTPATIENT)
Dept: INTERNAL MEDICINE | Facility: CLINIC | Age: 66
End: 2021-03-24

## 2021-03-24 RX ORDER — FLUCONAZOLE 150 MG/1
150 TABLET ORAL ONCE
Qty: 7 TABLET | Refills: 0 | Status: SHIPPED | OUTPATIENT
Start: 2021-03-24 | End: 2021-03-24 | Stop reason: SDUPTHER

## 2021-03-24 RX ORDER — FLUCONAZOLE 150 MG/1
150 TABLET ORAL ONCE
Qty: 7 TABLET | Refills: 0 | Status: SHIPPED | OUTPATIENT
Start: 2021-03-24 | End: 2021-03-26 | Stop reason: SDUPTHER

## 2021-03-24 NOTE — TELEPHONE ENCOUNTER
PHARMACY CALLED NEEDING A CALL BACK FOR CLARIFICATION ON THE DIRECTIONS FOR THE PRESCRIPTION fluconazole (Diflucan) 150 MG tablet.      CONTACT: [phone]

## 2021-03-24 NOTE — TELEPHONE ENCOUNTER
From: Raquel Mariee  To: Sanjeev Rawls MD  Sent: 3/23/2021 6:08 PM EDT  Subject: Prescription Question    Dr. Rawls, I am on antibiotics for sinus infection by Dr. Garza. I now have a yeast infection in my mouth and on my labia. Could you please give me Rx for Diflucan x 7 days. To [facility] pharmacy. This what they gave me in [place]. I pretty much needed to take this everytime I was on antibiotics. Thank you in advance. Lilian Mariee [phone] with any questions.

## 2021-03-26 ENCOUNTER — TELEPHONE (OUTPATIENT)
Dept: INTERNAL MEDICINE | Facility: CLINIC | Age: 66
End: 2021-03-26

## 2021-03-26 RX ORDER — FLUCONAZOLE 150 MG/1
150 TABLET ORAL DAILY
Qty: 7 TABLET | Refills: 0 | Status: SHIPPED | OUTPATIENT
Start: 2021-03-26 | End: 2021-04-26

## 2021-03-26 NOTE — TELEPHONE ENCOUNTER
"Pharmacy Name:  [facility]  P: [phone]  F: [phone]  Address    [address] [place] [address]          Pharmacy representative name: LILLIAN    Pharmacy representative phone number: [phone]    What medication are you calling in regards to:   fluconazole (Diflucan) 150 MG tablet    What question does the pharmacy have: LILLIAN WILL CLARITY AS TO HOW THE PRESCRIPTION IS WRITTEN VERSUS HOW IT IS TO BE ADMINISTERED.. IT IS CURRENTLY WRITTEN AS FOLLOWS:     \"TAKE 1 TABLET AS A ONE TIME DOSE\" QTY 7    Who is the provider that prescribed the medication: Sanjeev Rawls MD      Additional notes: LILLIAN IS REQUESTING A CALL TO CORRECT AND WILL ACCEPT A VERBAL OR THE PRESCRIPTION CAN BE RE-WRITTEN TO REFLECT THE ACCURACY.     LILLIAN HAS BEEN ADVISED TO ALLOW 48 HOURS FOR A FOLLOW UP ON THIS REQUEST.   "

## 2021-04-06 VITALS
SYSTOLIC BLOOD PRESSURE: 124 MMHG | TEMPERATURE: 97.7 F | SYSTOLIC BLOOD PRESSURE: 102 MMHG | OXYGEN SATURATION: 96 % | WEIGHT: 271 LBS | TEMPERATURE: 97.7 F | BODY MASS INDEX: 45.1 KG/M2 | WEIGHT: 264.5 LBS | DIASTOLIC BLOOD PRESSURE: 60 MMHG | SYSTOLIC BLOOD PRESSURE: 102 MMHG | DIASTOLIC BLOOD PRESSURE: 65 MMHG | TEMPERATURE: 98.1 F | WEIGHT: 263.01 LBS | BODY MASS INDEX: 46.26 KG/M2 | TEMPERATURE: 98.2 F | TEMPERATURE: 97.5 F | SYSTOLIC BLOOD PRESSURE: 130 MMHG | SYSTOLIC BLOOD PRESSURE: 106 MMHG | RESPIRATION RATE: 18 BRPM | TEMPERATURE: 97.7 F | DIASTOLIC BLOOD PRESSURE: 58 MMHG | OXYGEN SATURATION: 96 % | SYSTOLIC BLOOD PRESSURE: 115 MMHG | HEART RATE: 90 BPM | OXYGEN SATURATION: 97 % | OXYGEN SATURATION: 97 % | HEART RATE: 74 BPM | HEART RATE: 97 BPM | HEART RATE: 97 BPM | BODY MASS INDEX: 44.83 KG/M2 | BODY MASS INDEX: 44.1 KG/M2 | HEART RATE: 73 BPM | HEART RATE: 109 BPM | SYSTOLIC BLOOD PRESSURE: 131 MMHG | TEMPERATURE: 98.4 F | HEART RATE: 82 BPM | OXYGEN SATURATION: 94 % | BODY MASS INDEX: 47.08 KG/M2 | SYSTOLIC BLOOD PRESSURE: 121 MMHG | DIASTOLIC BLOOD PRESSURE: 78 MMHG | SYSTOLIC BLOOD PRESSURE: 148 MMHG | HEART RATE: 88 BPM | WEIGHT: 258.6 LBS | RESPIRATION RATE: 20 BRPM | DIASTOLIC BLOOD PRESSURE: 73 MMHG | RESPIRATION RATE: 16 BRPM | OXYGEN SATURATION: 95 % | BODY MASS INDEX: 45.5 KG/M2 | BODY MASS INDEX: 46.23 KG/M2 | OXYGEN SATURATION: 96 % | RESPIRATION RATE: 16 BRPM | HEART RATE: 88 BPM | OXYGEN SATURATION: 98 % | RESPIRATION RATE: 16 BRPM | TEMPERATURE: 97.7 F | OXYGEN SATURATION: 97 % | DIASTOLIC BLOOD PRESSURE: 65 MMHG | TEMPERATURE: 98.7 F | DIASTOLIC BLOOD PRESSURE: 81 MMHG | WEIGHT: 265 LBS | WEIGHT: 277.6 LBS | WEIGHT: 277.8 LBS | HEART RATE: 79 BPM | DIASTOLIC BLOOD PRESSURE: 63 MMHG | DIASTOLIC BLOOD PRESSURE: 58 MMHG | HEART RATE: 78 BPM | DIASTOLIC BLOOD PRESSURE: 64 MMHG | SYSTOLIC BLOOD PRESSURE: 114 MMHG | TEMPERATURE: 97.9 F | BODY MASS INDEX: 44.02 KG/M2 | HEART RATE: 70 BPM | OXYGEN SATURATION: 97 % | HEART RATE: 74 BPM | DIASTOLIC BLOOD PRESSURE: 78 MMHG | SYSTOLIC BLOOD PRESSURE: 110 MMHG | SYSTOLIC BLOOD PRESSURE: 136 MMHG | SYSTOLIC BLOOD PRESSURE: 136 MMHG | HEART RATE: 85 BPM | DIASTOLIC BLOOD PRESSURE: 58 MMHG | OXYGEN SATURATION: 96 % | OXYGEN SATURATION: 98 % | RESPIRATION RATE: 20 BRPM | HEIGHT: 64 IN | TEMPERATURE: 97.9 F | TEMPERATURE: 97.5 F | OXYGEN SATURATION: 98 % | SYSTOLIC BLOOD PRESSURE: 142 MMHG | OXYGEN SATURATION: 96 % | TEMPERATURE: 97.6 F | SYSTOLIC BLOOD PRESSURE: 131 MMHG | HEART RATE: 100 BPM | SYSTOLIC BLOOD PRESSURE: 153 MMHG | HEART RATE: 78 BPM | SYSTOLIC BLOOD PRESSURE: 135 MMHG | DIASTOLIC BLOOD PRESSURE: 70 MMHG | DIASTOLIC BLOOD PRESSURE: 67 MMHG | SYSTOLIC BLOOD PRESSURE: 113 MMHG | DIASTOLIC BLOOD PRESSURE: 67 MMHG | BODY MASS INDEX: 46.2 KG/M2 | TEMPERATURE: 97.6 F | OXYGEN SATURATION: 95 % | WEIGHT: 278 LBS | RESPIRATION RATE: 16 BRPM | HEART RATE: 84 BPM | WEIGHT: 275.8 LBS | TEMPERATURE: 97 F | DIASTOLIC BLOOD PRESSURE: 60 MMHG | HEART RATE: 71 BPM | DIASTOLIC BLOOD PRESSURE: 62 MMHG | OXYGEN SATURATION: 97 % | WEIGHT: 280.6 LBS | HEART RATE: 80 BPM | BODY MASS INDEX: 45.09 KG/M2 | OXYGEN SATURATION: 93 % | WEIGHT: 269.4 LBS | RESPIRATION RATE: 20 BRPM | TEMPERATURE: 96.7 F | DIASTOLIC BLOOD PRESSURE: 58 MMHG | RESPIRATION RATE: 16 BRPM | TEMPERATURE: 98.1 F | DIASTOLIC BLOOD PRESSURE: 63 MMHG | TEMPERATURE: 97.4 F | SYSTOLIC BLOOD PRESSURE: 148 MMHG | BODY MASS INDEX: 46.69 KG/M2 | TEMPERATURE: 97.1 F | RESPIRATION RATE: 16 BRPM

## 2021-04-07 RX ORDER — FUROSEMIDE 40 MG/1
40 TABLET ORAL 2 TIMES DAILY
Qty: 180 TABLET | Refills: 3 | Status: SHIPPED | OUTPATIENT
Start: 2021-04-07 | End: 2021-04-26 | Stop reason: SDUPTHER

## 2021-04-13 DIAGNOSIS — F41.9 ANXIETY: ICD-10-CM

## 2021-04-13 DIAGNOSIS — M33.20 POLYMYOSITIS (HCC): Primary | ICD-10-CM

## 2021-04-14 RX ORDER — CLONAZEPAM 0.5 MG/1
0.5 TABLET ORAL NIGHTLY PRN
Qty: 30 TABLET | Refills: 1 | Status: SHIPPED | OUTPATIENT
Start: 2021-04-14 | End: 2021-08-10 | Stop reason: SDUPTHER

## 2021-04-16 ENCOUNTER — HOSPITAL ENCOUNTER (OUTPATIENT)
Dept: INFUSION THERAPY | Facility: HOSPITAL | Age: 66
Setting detail: INFUSION SERIES
Discharge: HOME | End: 2021-04-16

## 2021-04-16 VITALS
DIASTOLIC BLOOD PRESSURE: 75 MMHG | BODY MASS INDEX: 41.65 KG/M2 | TEMPERATURE: 98.2 F | RESPIRATION RATE: 16 BRPM | OXYGEN SATURATION: 98 % | WEIGHT: 250 LBS | HEIGHT: 65 IN | HEART RATE: 101 BPM | SYSTOLIC BLOOD PRESSURE: 146 MMHG

## 2021-04-16 DIAGNOSIS — D83.9 COMMON VARIABLE IMMUNODEFICIENCY (HCC): Primary | ICD-10-CM

## 2021-04-16 DIAGNOSIS — Z95.828 PORT-A-CATH IN PLACE: ICD-10-CM

## 2021-04-16 LAB
CK SERPL-CCNC: 140 U/L (ref 20–180)
MYOGLOBIN SERPL-MCNC: 63.8 NG/ML (ref 25–58)

## 2021-04-16 PROCEDURE — 96365 THER/PROPH/DIAG IV INF INIT: CPT

## 2021-04-16 PROCEDURE — 82550 ASSAY OF CK (CPK): CPT | Performed by: INTERNAL MEDICINE

## 2021-04-16 PROCEDURE — 25010000002 IMMUNE GLOBULIN (HUMAN) 20 GM/200ML SOLUTION: Performed by: ALLERGY & IMMUNOLOGY

## 2021-04-16 PROCEDURE — 96366 THER/PROPH/DIAG IV INF ADDON: CPT

## 2021-04-16 PROCEDURE — 25010000002 HEPARIN LOCK FLUSH PER 10 UNITS

## 2021-04-16 PROCEDURE — 83874 ASSAY OF MYOGLOBIN: CPT | Performed by: INTERNAL MEDICINE

## 2021-04-16 RX ORDER — METHYLPREDNISOLONE SODIUM SUCCINATE 125 MG/2ML
50 INJECTION, POWDER, LYOPHILIZED, FOR SOLUTION INTRAMUSCULAR; INTRAVENOUS ONCE AS NEEDED
Status: CANCELLED
Start: 2021-05-14

## 2021-04-16 RX ORDER — PREDNISONE 20 MG/1
40 TABLET ORAL ONCE
Status: DISCONTINUED | OUTPATIENT
Start: 2021-04-16 | End: 2021-04-18 | Stop reason: HOSPADM

## 2021-04-16 RX ORDER — DIPHENHYDRAMINE HCL 25 MG
50 CAPSULE ORAL ONCE
Status: CANCELLED
Start: 2021-05-14 | End: 2021-05-14

## 2021-04-16 RX ORDER — ACETAMINOPHEN 325 MG/1
325 TABLET ORAL ONCE
Status: DISCONTINUED | OUTPATIENT
Start: 2021-04-16 | End: 2021-04-18 | Stop reason: HOSPADM

## 2021-04-16 RX ORDER — PREDNISONE 20 MG/1
40 TABLET ORAL ONCE
Status: CANCELLED
Start: 2021-05-14 | End: 2021-05-14

## 2021-04-16 RX ORDER — DIPHENHYDRAMINE HCL 25 MG
50 CAPSULE ORAL ONCE
Status: DISCONTINUED | OUTPATIENT
Start: 2021-04-16 | End: 2021-04-18 | Stop reason: HOSPADM

## 2021-04-16 RX ORDER — ACETAMINOPHEN 325 MG/1
325 TABLET ORAL ONCE
Status: CANCELLED | OUTPATIENT
Start: 2021-05-14

## 2021-04-16 RX ORDER — DIPHENHYDRAMINE HCL 25 MG
50 CAPSULE ORAL ONCE AS NEEDED
Status: DISCONTINUED | OUTPATIENT
Start: 2021-04-16 | End: 2021-04-18 | Stop reason: HOSPADM

## 2021-04-16 RX ORDER — DIPHENHYDRAMINE HCL 25 MG
50 CAPSULE ORAL ONCE AS NEEDED
Status: CANCELLED
Start: 2021-05-14

## 2021-04-16 RX ORDER — METHYLPREDNISOLONE SODIUM SUCCINATE 125 MG/2ML
50 INJECTION, POWDER, LYOPHILIZED, FOR SOLUTION INTRAMUSCULAR; INTRAVENOUS ONCE AS NEEDED
Status: DISCONTINUED | OUTPATIENT
Start: 2021-04-16 | End: 2021-04-18 | Stop reason: HOSPADM

## 2021-04-16 RX ORDER — SODIUM CHLORIDE 0.9 % (FLUSH) 0.9 %
10 SYRINGE (ML) INJECTION AS NEEDED
Status: CANCELLED | OUTPATIENT
Start: 2021-04-16

## 2021-04-16 RX ORDER — HEPARIN SODIUM (PORCINE) LOCK FLUSH IV SOLN 100 UNIT/ML 100 UNIT/ML
500 SOLUTION INTRAVENOUS AS NEEDED
Status: DISCONTINUED | OUTPATIENT
Start: 2021-04-16 | End: 2021-04-18 | Stop reason: HOSPADM

## 2021-04-16 RX ORDER — SODIUM CHLORIDE 0.9 % (FLUSH) 0.9 %
10 SYRINGE (ML) INJECTION AS NEEDED
Status: DISCONTINUED | OUTPATIENT
Start: 2021-04-16 | End: 2021-04-18 | Stop reason: HOSPADM

## 2021-04-16 RX ORDER — HEPARIN SODIUM (PORCINE) LOCK FLUSH IV SOLN 100 UNIT/ML 100 UNIT/ML
500 SOLUTION INTRAVENOUS AS NEEDED
Status: CANCELLED | OUTPATIENT
Start: 2021-04-16

## 2021-04-16 RX ORDER — HEPARIN SODIUM (PORCINE) LOCK FLUSH IV SOLN 100 UNIT/ML 100 UNIT/ML
SOLUTION INTRAVENOUS
Status: COMPLETED
Start: 2021-04-16 | End: 2021-04-16

## 2021-04-16 RX ORDER — PREDNISONE 20 MG/1
40 TABLET ORAL ONCE AS NEEDED
Status: CANCELLED
Start: 2021-05-14

## 2021-04-16 RX ORDER — EPINEPHRINE 1 MG/ML
0.3 INJECTION, SOLUTION INTRAMUSCULAR; SUBCUTANEOUS ONCE AS NEEDED
Status: DISCONTINUED | OUTPATIENT
Start: 2021-04-16 | End: 2021-04-18 | Stop reason: HOSPADM

## 2021-04-16 RX ORDER — PREDNISONE 20 MG/1
40 TABLET ORAL ONCE AS NEEDED
Status: DISCONTINUED | OUTPATIENT
Start: 2021-04-16 | End: 2021-04-18 | Stop reason: HOSPADM

## 2021-04-16 RX ORDER — EPINEPHRINE 1 MG/ML
0.3 INJECTION, SOLUTION INTRAMUSCULAR; SUBCUTANEOUS ONCE AS NEEDED
Status: CANCELLED
Start: 2021-05-14

## 2021-04-16 RX ADMIN — HEPARIN SODIUM (PORCINE) LOCK FLUSH IV SOLN 100 UNIT/ML 500 UNITS: 100 SOLUTION at 13:32

## 2021-04-16 RX ADMIN — HEPARIN 500 UNITS: 100 SYRINGE at 13:32

## 2021-04-16 RX ADMIN — SODIUM CHLORIDE, PRESERVATIVE FREE 10 ML: 5 INJECTION INTRAVENOUS at 13:31

## 2021-04-16 RX ADMIN — IMMUNE GLOBULIN INFUSION (HUMAN) 20 G: 100 INJECTION, SOLUTION INTRAVENOUS; SUBCUTANEOUS at 12:20

## 2021-04-16 RX ADMIN — IMMUNE GLOBULIN INFUSION (HUMAN) 20 G: 100 INJECTION, SOLUTION INTRAVENOUS; SUBCUTANEOUS at 09:25

## 2021-04-16 NOTE — CODE DOCUMENTATION
Asthma and Allergy contacted regarding BP 82/60 after IVIG was initiated.  not in office but spoke with medical staff and advised to monitor BP closely and may have to run IVIG at a slower rate. Pt states she did not take her BP or diuretics this morning b/c her BP dropped when she received her last IVIG infusion.

## 2021-04-26 ENCOUNTER — OFFICE VISIT (OUTPATIENT)
Dept: INTERNAL MEDICINE | Facility: CLINIC | Age: 66
End: 2021-04-26

## 2021-04-26 ENCOUNTER — TELEPHONE (OUTPATIENT)
Dept: INTERNAL MEDICINE | Facility: CLINIC | Age: 66
End: 2021-04-26

## 2021-04-26 VITALS
WEIGHT: 256 LBS | HEART RATE: 92 BPM | OXYGEN SATURATION: 94 % | DIASTOLIC BLOOD PRESSURE: 70 MMHG | HEIGHT: 65 IN | BODY MASS INDEX: 42.65 KG/M2 | TEMPERATURE: 97.3 F | RESPIRATION RATE: 16 BRPM | SYSTOLIC BLOOD PRESSURE: 110 MMHG

## 2021-04-26 DIAGNOSIS — E86.0 DEHYDRATION: ICD-10-CM

## 2021-04-26 DIAGNOSIS — G72.9 POLYMYOPATHY: Primary | ICD-10-CM

## 2021-04-26 DIAGNOSIS — N18.31 CHRONIC RENAL IMPAIRMENT, STAGE 3A (HCC): ICD-10-CM

## 2021-04-26 DIAGNOSIS — I95.89 HYPOTENSION DUE TO HYPOVOLEMIA: ICD-10-CM

## 2021-04-26 DIAGNOSIS — E86.1 HYPOTENSION DUE TO HYPOVOLEMIA: ICD-10-CM

## 2021-04-26 PROBLEM — M79.7 FIBROMYALGIA SYNDROME: Status: ACTIVE | Noted: 2018-01-16

## 2021-04-26 PROBLEM — I34.0 MITRAL VALVE REGURGITATION: Status: ACTIVE | Noted: 2021-04-26

## 2021-04-26 PROBLEM — I25.10 CAD (CORONARY ARTERY DISEASE): Status: ACTIVE | Noted: 2021-04-26

## 2021-04-26 PROBLEM — I47.11 INAPPROPRIATE SINUS TACHYCARDIA: Status: ACTIVE | Noted: 2018-07-19

## 2021-04-26 PROBLEM — H49.12: Status: ACTIVE | Noted: 2019-01-16

## 2021-04-26 PROBLEM — I20.1 PRINZMETAL'S ANGINA: Status: ACTIVE | Noted: 2018-05-24

## 2021-04-26 PROBLEM — I06.2 AORTIC VALVE STENOSIS AND INSUFFICIENCY, RHEUMATIC: Status: ACTIVE | Noted: 2021-04-26

## 2021-04-26 PROBLEM — E78.00 PURE HYPERCHOLESTEROLEMIA: Status: ACTIVE | Noted: 2018-02-12

## 2021-04-26 PROBLEM — H25.813 COMBINED FORMS OF AGE-RELATED CATARACT OF BOTH EYES: Status: ACTIVE | Noted: 2018-06-22

## 2021-04-26 PROBLEM — I71.20 THORACIC AORTIC ANEURYSM WITHOUT RUPTURE: Status: ACTIVE | Noted: 2021-04-26

## 2021-04-26 PROBLEM — R00.0 INAPPROPRIATE SINUS TACHYCARDIA: Status: ACTIVE | Noted: 2018-07-19

## 2021-04-26 PROBLEM — M19.032 PRIMARY OSTEOARTHRITIS OF LEFT WRIST: Status: ACTIVE | Noted: 2018-08-28

## 2021-04-26 PROBLEM — F41.9 ANXIETY DISORDER: Status: ACTIVE | Noted: 2018-04-05

## 2021-04-26 PROBLEM — M75.102 ROTATOR CUFF SYNDROME OF LEFT SHOULDER: Status: ACTIVE | Noted: 2018-08-28

## 2021-04-26 PROCEDURE — 99214 OFFICE O/P EST MOD 30 MIN: CPT | Performed by: INTERNAL MEDICINE

## 2021-04-26 RX ORDER — FUROSEMIDE 40 MG/1
40 TABLET ORAL DAILY
Qty: 180 TABLET | Refills: 3 | Status: SHIPPED | OUTPATIENT
Start: 2021-04-26 | End: 2021-04-26 | Stop reason: SDUPTHER

## 2021-04-26 RX ORDER — POTASSIUM CITRATE 10 MEQ/1
10 TABLET, EXTENDED RELEASE ORAL DAILY
Qty: 90 TABLET | Refills: 3 | Status: SHIPPED | OUTPATIENT
Start: 2021-04-26 | End: 2022-07-13 | Stop reason: SDUPTHER

## 2021-04-26 RX ORDER — PREDNISONE 1 MG/1
5 TABLET ORAL DAILY
Qty: 30 TABLET | Refills: 1 | Status: SHIPPED | OUTPATIENT
Start: 2021-04-26 | End: 2021-11-10

## 2021-04-26 RX ORDER — FUROSEMIDE 40 MG/1
40 TABLET ORAL DAILY
Qty: 90 TABLET | Refills: 3 | Status: SHIPPED | OUTPATIENT
Start: 2021-04-26 | End: 2022-06-06 | Stop reason: SDUPTHER

## 2021-04-26 RX ORDER — AZITHROMYCIN 250 MG/1
TABLET, FILM COATED ORAL
Qty: 12 TABLET | Refills: 0 | Status: SHIPPED | OUTPATIENT
Start: 2021-04-26 | End: 2021-09-21

## 2021-04-26 NOTE — PROGRESS NOTES
Subjective     Patient ID: Raquel Mariee is a 65 y.o. female. Patient is here for management of multiple medical problems.     Chief Complaint   Patient presents with   • Polymyopathy     History of Present Illness       Low bp during infusions.   o n reduced clonazepam now 0.25  Mg at night.  Do ok on cpap.          The following portions of the patient's history were reviewed and updated as appropriate: allergies, current medications, past family history, past medical history, past social history, past surgical history and problem list.    Review of Systems   Constitutional: Negative for fatigue.   Psychiatric/Behavioral: Negative for self-injury.   All other systems reviewed and are negative.      Current Outpatient Medications:   •  acetaminophen (TYLENOL) 325 MG tablet, Take 1-2 tablets by mouth prior to Gammagard Infusion, Disp: 10 tablet, Rfl: 11  •  albuterol sulfate  (90 Base) MCG/ACT inhaler, Inhale 2 puffs by mouth every 4-6 hours as needed for cough, wheeze, shortness of breath. Use with vortex spacer, Disp: 18 g, Rfl: 3  •  allopurinol (Zyloprim) 100 MG tablet, Take 1 tablet by mouth Daily., Disp: 90 tablet, Rfl: 3  •  amitriptyline (ELAVIL) 10 MG tablet, Take 10 mg by mouth 2 (Two) Times a Day., Disp: , Rfl:   •  Azelastine HCl 137 MCG/SPRAY solution, Instill 1-2 sprays in each nostril twice a day as needed, Disp: 30 mL, Rfl: 11  •  buPROPion SR (WELLBUTRIN SR) 150 MG 12 hr tablet, Take 1 tablet by mouth 2 (two) times a day., Disp: 180 tablet, Rfl: 3  •  calcium carbonate (Antacid Maximum) 1000 MG chewable tablet, Chew 500 mg 3 (Three) Times a Day., Disp: , Rfl:   •  CBD (cannabidiol) oral oil, Take  by mouth 2 (Two) Times a Day., Disp: , Rfl:   •  chlorhexidine (PERIDEX) 0.12 % solution, Swish 15 mL around mouth for 30 seconds, then spit out. Use twice daily as directed. , Disp: 473 mL, Rfl: 0  •  cholecalciferol (VITAMIN D3) 25 MCG (1000 UT) tablet, Take 1,000 Units by mouth Daily., Disp: ,  Rfl:   •  clonazePAM (KlonoPIN) 0.5 MG tablet, Take 1 tablet by mouth At Night As Needed, Disp: 30 tablet, Rfl: 1  •  Dextromethorphan-guaiFENesin (Mucus-DM)  MG tablet sustained-release 12 hour, Take  by mouth., Disp: , Rfl:   •  dicyclomine (BENTYL) 10 MG capsule, Take 1 capsule by mouth 3 (Three) Times a Day., Disp: 270 capsule, Rfl: 3  •  diphenhydrAMINE (Benadryl Allergy) 25 MG tablet, Take 1-2 tablets by mouth Every 4-6 Hours As Needed., Disp: 90 tablet, Rfl: 11  •  EPINEPHrine (EPIPEN) 0.3 MG/0.3ML solution auto-injector injection, Use as directed for acute allergic reaction, Disp: 2 each, Rfl: 3  •  famotidine (PEPCID) 20 MG tablet, Take 20 mg by mouth 2 (Two) Times a Day., Disp: , Rfl:   •  ferrous sulfate 325 (65 Fe) MG tablet, Take  by mouth., Disp: , Rfl:   •  fexofenadine (ALLEGRA) 180 MG tablet, Take 180 mg by mouth Daily., Disp: , Rfl:   •  fluticasone (FLONASE) 50 MCG/ACT nasal spray, Instill 2 sprays into each nostril daily., Disp: 16 g, Rfl: 11  •  fluticasone (FLOVENT DISKUS) 50 MCG/BLIST diskus inhaler, Inhale 1 puff 2 (Two) Times a Day., Disp: , Rfl:   •  fluticasone-salmeterol (Advair HFA) 230-21 MCG/ACT inhaler, Inhale 2 puffs by mouth 2 (Two) Times a Day., Disp: 12 g, Rfl: 11  •  fluticasone-salmeterol (ADVAIR) 500-50 MCG/DOSE DISKUS, Inhale 1 puff., Disp: , Rfl:   •  folic acid (FOLVITE) 1 MG tablet, Take 1 tablet by mouth Daily., Disp: 90 tablet, Rfl: 3  •  furosemide (LASIX) 40 MG tablet, Take 1 tablet by mouth Daily., Disp: 180 tablet, Rfl: 3  •  glucose-vitamin C ([employer] Glucose) 4-6 GM-MG chewable tablet chewable tablet, Chew., Disp: , Rfl:   •  guaiFENesin-codeine (Virtussin A/C) 100-10 MG/5ML liquid, Take 5 mL by mouth 3 (Three) Times a Day As Needed for Cough., Disp: , Rfl:   •  hydrOXYzine pamoate (VISTARIL) 50 MG capsule, Take 50 mg by mouth Every Night., Disp: , Rfl:   •  ipratropium (ATROVENT) 0.06 % nasal spray, Spray 2 sprays into the nostril(s) every night at bedtime as  directed by provider, Disp: 15 mL, Rfl: 12  •  ipratropium-albuterol (DUO-NEB) 0.5-2.5 mg/3 ml nebulizer, Inhale contents of 1 vial (3mL) via nebulizer every 4-6 hours as needed for coughing, wheezing, or shortness of breath., Disp: 300 mL, Rfl: 3  •  isosorbide dinitrate (ISORDIL) 10 MG tablet, Take 1 tablet by mouth 3 (Three) Times a Day., Disp: 270 tablet, Rfl: 3  •  levothyroxine (SYNTHROID, LEVOTHROID) 50 MCG tablet, Take 50 mcg by mouth Daily., Disp: , Rfl:   •  Magnesium 250 MG tablet, Take 500 mg by mouth Daily. Two tablets, Disp: , Rfl:   •  melatonin 5 MG tablet tablet, Take 5 mg by mouth Every Night., Disp: , Rfl:   •  mepolizumab (NUCALA) 100 MG reconstituted solution injection, Inject 100 mg under the skin into the appropriate area as directed Every 30 (Thirty) Days., Disp: , Rfl:   •  methotrexate 2.5 MG tablet, Take 2 tablets by mouth 1 (One) Time Per Week., Disp: 8 tablet, Rfl: 5  •  montelukast (SINGULAIR) 10 MG tablet, Take 10 mg by mouth Every Night., Disp: , Rfl:   •  montelukast (SINGULAIR) 10 MG tablet, Take 1 tablet by mouth every night at bedtime., Disp: 30 tablet, Rfl: 11  •  multivitamin with minerals (ONE-A-DAY PROACTIVE 65+ PO), Take 1 tablet by mouth Daily., Disp: , Rfl:   •  omeprazole (priLOSEC) 40 MG capsule, Take 1 capsule by mouth 2 (two) times a day., Disp: 90 capsule, Rfl: 3  •  ondansetron (ZOFRAN) 4 MG tablet, Take 4 mg by mouth Every 8 (Eight) Hours As Needed for Nausea or Vomiting., Disp: , Rfl:   •  potassium citrate (UROCIT-K) 10 MEQ (1080 MG) CR tablet, Take 1 tablet by mouth Daily., Disp: 90 tablet, Rfl: 3  •  predniSONE (DELTASONE) 20 MG tablet, Take 2 tablets by mouth daily for 4 days, then 1 tablet daily for 3 days. Take 2 tablets by mouth prior to Gammagard infusion, Disp: 15 tablet, Rfl: 0  •  promethazine-dextromethorphan (PROMETHAZINE-DM) 6.25-15 MG/5ML syrup, Take 5 mL by mouth 4 (Four) Times a Day As Needed for Cough., Disp: 240 mL, Rfl: 0  •  Spacer/Aero-Holding  "Chambers (AeroChamber Plus Wali-Vu) misc, Use as directed with albuterol inhaler, Disp: 1 each, Rfl: 0  •  tiotropium (SPIRIVA) 18 MCG per inhalation capsule, Place 1 capsule into inhaler and inhale Daily., Disp: , Rfl:   •  tiotropium bromide monohydrate (SPIRIVA RESPIMAT) 2.5 MCG/ACT aerosol solution inhaler, Inhale 2 puffs by mouth daily, Disp: 4 g, Rfl: 11  •  tiZANidine (ZANAFLEX) 2 MG tablet, Take 1 tablet by mouth Every Night., Disp: 90 tablet, Rfl: 1  •  ubrogepant (ubrogepant) 100 MG tablet, Take 1 tablet by mouth once per day as needed at onset of migraine, Disp: 10 tablet, Rfl: 11  •  valsartan (DIOVAN) 160 MG tablet, Take 1 tablet by mouth Daily., Disp: 90 tablet, Rfl: 3  •  vitamin B-12 (CYANOCOBALAMIN) 1000 MCG tablet, Take 1,000 mcg by mouth 2 (two) times a day., Disp: , Rfl:   •  vitamin C (ASCORBIC ACID) 250 MG tablet, Take 250 mg by mouth Daily., Disp: , Rfl:   •  Zoster Vac Recomb Adjuvanted 50 MCG/0.5ML reconstituted suspension, Inject as directed per protocol, Disp: 1 each, Rfl: 0  •  azithromycin (Zithromax) 250 MG tablet, Take 4 tablets by mouth on day of dental surgery., Disp: 12 tablet, Rfl: 0  •  predniSONE (DELTASONE) 5 MG tablet, Take 1 tablet by mouth Daily., Disp: 30 tablet, Rfl: 1    Current Facility-Administered Medications:   •  heparin injection 500 Units, 5 mL, Intravenous, PRN, Sanjeev Rawls MD  •  sodium chloride 0.9 % flush 10 mL, 10 mL, Intravenous, Q12H, Sanjeev Rawls MD  •  sodium chloride 0.9 % flush 10 mL, 10 mL, Intravenous, PRN, Sanjeev Rawls MD  •  sodium chloride 0.9 % flush 20 mL, 20 mL, Intravenous, PRN, Sanjeev Rawls MD    Objective      Blood pressure 110/70, pulse 92, temperature 97.3 °F (36.3 °C), resp. rate 16, height 165.1 cm (65\"), weight 116 kg (256 lb), SpO2 94 %.    Physical Exam     General Appearance:    Alert, cooperative, no distress, appears stated age   Head:    Normocephalic, without obvious abnormality, atraumatic   Eyes:    PERRL, " conjunctiva/corneas clear, EOM's intact   Ears:    Normal TM's and external ear canals, both ears   Nose:   Nares normal, septum midline, mucosa normal, no drainage   or sinus tenderness   Throat:   Lips, mucosa, and tongue normal; teeth and gums normal   Neck:   Supple, symmetrical, trachea midline, no adenopathy;        thyroid:  No enlargement/tenderness/nodules; no carotid    bruit or JVD   Back:     Symmetric, no curvature, ROM normal, no CVA tenderness   Lungs:     Clear to auscultation bilaterally, respirations unlabored   Chest wall:    No tenderness or deformity   Heart:    Regular rate and rhythm, S1 and S2 normal, no murmur,        rub or gallop   Abdomen:     Soft, non-tender, bowel sounds active all four quadrants,     no masses, no organomegaly   Extremities:   Extremities normal, atraumatic, no cyanosis or edema   Pulses:   2+ and symmetric all extremities   Skin:   Skin color, texture, turgor normal, no rashes or lesions   Lymph nodes:   Cervical, supraclavicular, and axillary nodes normal   Neurologic:   CNII-XII intact. Normal strength, sensation and reflexes       throughout      Results for orders placed or performed during the hospital encounter of 04/16/21   CK    Specimen: Blood   Result Value Ref Range    Creatine Kinase 140 20 - 180 U/L   Myoglobin, Serum    Specimen: Blood   Result Value Ref Range    Myoglobin 63.8 (H) 25.0 - 58.0 ng/mL         Assessment/Plan   Pt of neruotin. Edema resolved. bp low. Decrease lasix. To qd and maybe qd prn.        Diagnoses and all orders for this visit:    1. Polymyopathy (Primary)  -     CK  -     Myoglobin, Serum  -     Comprehensive Metabolic Panel  -     TSH  -     T4, Free  -     Vitamin B12  -     Vitamin B6    2. Chronic renal impairment, stage 3a (CMS/HCC)  -     CK  -     Myoglobin, Serum  -     Comprehensive Metabolic Panel  -     TSH  -     T4, Free  -     Vitamin B12  -     Vitamin B6    3. Dehydration  -     CK  -     Myoglobin, Serum  -      Comprehensive Metabolic Panel  -     TSH  -     T4, Free  -     Vitamin B12  -     Vitamin B6    4. Hypotension due to hypovolemia  -     CK  -     Myoglobin, Serum  -     Comprehensive Metabolic Panel  -     TSH  -     T4, Free  -     Vitamin B12  -     Vitamin B6    Other orders  -     azithromycin (Zithromax) 250 MG tablet; Take 4 tablets by mouth on day of dental surgery.  Dispense: 12 tablet; Refill: 0  -     furosemide (LASIX) 40 MG tablet; Take 1 tablet by mouth Daily.  Dispense: 180 tablet; Refill: 3  -     predniSONE (DELTASONE) 5 MG tablet; Take 1 tablet by mouth Daily.  Dispense: 30 tablet; Refill: 1  -     potassium citrate (UROCIT-K) 10 MEQ (1080 MG) CR tablet; Take 1 tablet by mouth Daily.  Dispense: 90 tablet; Refill: 3      Return in about 2 weeks (around 5/10/2021).          There are no Patient Instructions on file for this visit.     Sanjeev Rawls MD    Assessment/Plan

## 2021-04-26 NOTE — TELEPHONE ENCOUNTER
PRESCRIPTION DOSAGE FOR THE furosemide (LASIX) 40 MG tablet WAS CHANGED TO ONCE A DAY FROM TWICE A DAY BUT THE QUANTITY WAS NOT CHANGED FROM 180. PHARMACY NEEDS TO KNOW IF THIS IS CORRECT. PLEASE CONTACT   [facility] (Pharmacy) [phone]

## 2021-04-27 ENCOUNTER — OFFICE VISIT (OUTPATIENT)
Dept: NEUROLOGY | Facility: CLINIC | Age: 66
End: 2021-04-27

## 2021-04-27 VITALS
BODY MASS INDEX: 42.65 KG/M2 | SYSTOLIC BLOOD PRESSURE: 110 MMHG | HEIGHT: 65 IN | HEART RATE: 94 BPM | OXYGEN SATURATION: 96 % | TEMPERATURE: 98.4 F | DIASTOLIC BLOOD PRESSURE: 70 MMHG | WEIGHT: 256 LBS

## 2021-04-27 DIAGNOSIS — G47.33 OBSTRUCTIVE SLEEP APNEA: Primary | ICD-10-CM

## 2021-04-27 PROCEDURE — 99213 OFFICE O/P EST LOW 20 MIN: CPT | Performed by: NURSE PRACTITIONER

## 2021-04-27 NOTE — PROGRESS NOTES
"     Follow Up Office Visit      Patient Name: Raquel Mariee  : 1955   MRN: [id number]     Chief Complaint:    Chief Complaint   Patient presents with   • Follow-up     Patient in office to follow up on barry.        History of Present Illness: Raquel Mariee is a 65 y.o. female who is here today to follow up with BARRY.  She was last seen on 2021 for a sleep consultation.  Titration study on howed a good response to CPAP 6cm-unsure why a split night study was not performed when that is what was ordered.  She is currently on CPAP 6cm, compliance 83.3%, AHI 8.8/hour.  She is tolerating her CPAP pressure well and has no complaints or concerns today.  She has not had any symptomatic improvement since starting back on CPAP therapy.      Following taken from initial consult note:  Raquel Mariee is a 65 y.o. female who is here today to establish care with Sleep Medicine.  She says she is here because her PCP thinks she has \"breathing issues at night\".  Derby score 0.  Sleep questionnaire reviewed.  She snores.  She wakes up with a dry mouth.  She feels rested upon awakening normally.  She feels better with more sleep.  She has awakened at night with a burning sensation in her chest.  She is taking medication for reflux.  It takes her up to an hour to fall asleep.  She is in bed for 10-11 hours per night.  She wakes up 2-3 times during the night.  She says she was diagnosed with BARRY prior to having weight loss surgery.  Additional risk factors- BMI 42, CHF, AAA, hypothyroidism, history of weight loss surgery (18 years ago), mood disorder, polymyositis, history of irregular heartbeat, history of BARRY with therapy, autoimmune disorder.     Subjective      Review of Systems:   Review of Systems   Constitutional: Negative for chills, fatigue and fever.   HENT: Negative for facial swelling, hearing loss, sore throat, tinnitus and trouble swallowing.    Eyes: Negative for blurred vision, double " vision, photophobia and visual disturbance.   Respiratory: Positive for apnea. Negative for cough, chest tightness and shortness of breath.    Cardiovascular: Negative for chest pain, palpitations and leg swelling.   Gastrointestinal: Negative for abdominal pain, nausea and vomiting.   Endocrine: Negative for cold intolerance and heat intolerance.   Musculoskeletal: Negative for gait problem, neck pain and neck stiffness.   Skin: Negative for color change and rash.   Allergic/Immunologic: Negative for environmental allergies and food allergies.   Neurological: Negative for dizziness, syncope, speech difficulty, weakness, light-headedness, numbness, headache and memory problem.   Psychiatric/Behavioral: Negative for behavioral problems, sleep disturbance and depressed mood. The patient is not nervous/anxious.        I have reviewed and the following portions of the patient's history were updated as appropriate: past family history, past medical history, past social history, past surgical history and problem list.    Medications:     Current Outpatient Medications:   •  acetaminophen (TYLENOL) 325 MG tablet, Take 1-2 tablets by mouth prior to Gammagard Infusion, Disp: 10 tablet, Rfl: 11  •  albuterol sulfate  (90 Base) MCG/ACT inhaler, Inhale 2 puffs by mouth every 4-6 hours as needed for cough, wheeze, shortness of breath. Use with vortex spacer, Disp: 18 g, Rfl: 3  •  allopurinol (Zyloprim) 100 MG tablet, Take 1 tablet by mouth Daily., Disp: 90 tablet, Rfl: 3  •  amitriptyline (ELAVIL) 10 MG tablet, Take 10 mg by mouth 2 (Two) Times a Day., Disp: , Rfl:   •  Azelastine HCl 137 MCG/SPRAY solution, Instill 1-2 sprays in each nostril twice a day as needed, Disp: 30 mL, Rfl: 11  •  azithromycin (Zithromax) 250 MG tablet, Take 4 tablets by mouth on day of dental surgery., Disp: 12 tablet, Rfl: 0  •  buPROPion SR (WELLBUTRIN SR) 150 MG 12 hr tablet, Take 1 tablet by mouth 2 (two) times a day., Disp: 180 tablet,  Rfl: 3  •  calcium carbonate (Antacid Maximum) 1000 MG chewable tablet, Chew 500 mg 3 (Three) Times a Day., Disp: , Rfl:   •  CBD (cannabidiol) oral oil, Take  by mouth 2 (Two) Times a Day., Disp: , Rfl:   •  chlorhexidine (PERIDEX) 0.12 % solution, Swish 15 mL around mouth for 30 seconds, then spit out. Use twice daily as directed. , Disp: 473 mL, Rfl: 0  •  cholecalciferol (VITAMIN D3) 25 MCG (1000 UT) tablet, Take 1,000 Units by mouth Daily., Disp: , Rfl:   •  clonazePAM (KlonoPIN) 0.5 MG tablet, Take 1 tablet by mouth At Night As Needed, Disp: 30 tablet, Rfl: 1  •  Dextromethorphan-guaiFENesin (Mucus-DM)  MG tablet sustained-release 12 hour, Take  by mouth., Disp: , Rfl:   •  dicyclomine (BENTYL) 10 MG capsule, Take 1 capsule by mouth 3 (Three) Times a Day., Disp: 270 capsule, Rfl: 3  •  diphenhydrAMINE (Benadryl Allergy) 25 MG tablet, Take 1-2 tablets by mouth Every 4-6 Hours As Needed., Disp: 90 tablet, Rfl: 11  •  EPINEPHrine (EPIPEN) 0.3 MG/0.3ML solution auto-injector injection, Use as directed for acute allergic reaction, Disp: 2 each, Rfl: 3  •  famotidine (PEPCID) 20 MG tablet, Take 20 mg by mouth 2 (Two) Times a Day., Disp: , Rfl:   •  ferrous sulfate 325 (65 Fe) MG tablet, Take  by mouth., Disp: , Rfl:   •  fexofenadine (ALLEGRA) 180 MG tablet, Take 180 mg by mouth Daily., Disp: , Rfl:   •  fluticasone (FLONASE) 50 MCG/ACT nasal spray, Instill 2 sprays into each nostril daily., Disp: 16 g, Rfl: 11  •  fluticasone (FLOVENT DISKUS) 50 MCG/BLIST diskus inhaler, Inhale 1 puff 2 (Two) Times a Day., Disp: , Rfl:   •  fluticasone-salmeterol (Advair HFA) 230-21 MCG/ACT inhaler, Inhale 2 puffs by mouth 2 (Two) Times a Day., Disp: 12 g, Rfl: 11  •  fluticasone-salmeterol (ADVAIR) 500-50 MCG/DOSE DISKUS, Inhale 1 puff., Disp: , Rfl:   •  folic acid (FOLVITE) 1 MG tablet, Take 1 tablet by mouth Daily., Disp: 90 tablet, Rfl: 3  •  furosemide (LASIX) 40 MG tablet, Take 1 tablet by mouth Daily., Disp: 90  tablet, Rfl: 3  •  glucose-vitamin C (Meijer Glucose) 4-6 GM-MG chewable tablet chewable tablet, Chew., Disp: , Rfl:   •  guaiFENesin-codeine (Virtussin A/C) 100-10 MG/5ML liquid, Take 5 mL by mouth 3 (Three) Times a Day As Needed for Cough., Disp: , Rfl:   •  ipratropium (ATROVENT) 0.06 % nasal spray, Spray 2 sprays into the nostril(s) every night at bedtime as directed by provider, Disp: 15 mL, Rfl: 12  •  ipratropium-albuterol (DUO-NEB) 0.5-2.5 mg/3 ml nebulizer, Inhale contents of 1 vial (3mL) via nebulizer every 4-6 hours as needed for coughing, wheezing, or shortness of breath., Disp: 300 mL, Rfl: 3  •  isosorbide dinitrate (ISORDIL) 10 MG tablet, Take 1 tablet by mouth 3 (Three) Times a Day., Disp: 270 tablet, Rfl: 3  •  levothyroxine (SYNTHROID, LEVOTHROID) 50 MCG tablet, Take 50 mcg by mouth Daily., Disp: , Rfl:   •  Magnesium 250 MG tablet, Take 500 mg by mouth Daily. Two tablets, Disp: , Rfl:   •  melatonin 5 MG tablet tablet, Take 5 mg by mouth Every Night., Disp: , Rfl:   •  mepolizumab (NUCALA) 100 MG reconstituted solution injection, Inject 100 mg under the skin into the appropriate area as directed Every 30 (Thirty) Days., Disp: , Rfl:   •  methotrexate 2.5 MG tablet, Take 2 tablets by mouth 1 (One) Time Per Week., Disp: 8 tablet, Rfl: 5  •  montelukast (SINGULAIR) 10 MG tablet, Take 10 mg by mouth Every Night., Disp: , Rfl:   •  multivitamin with minerals (ONE-A-DAY PROACTIVE 65+ PO), Take 1 tablet by mouth Daily., Disp: , Rfl:   •  omeprazole (priLOSEC) 40 MG capsule, Take 1 capsule by mouth 2 (two) times a day., Disp: 90 capsule, Rfl: 3  •  ondansetron (ZOFRAN) 4 MG tablet, Take 4 mg by mouth Every 8 (Eight) Hours As Needed for Nausea or Vomiting., Disp: , Rfl:   •  potassium citrate (UROCIT-K) 10 MEQ (1080 MG) CR tablet, Take 1 tablet by mouth Daily., Disp: 90 tablet, Rfl: 3  •  predniSONE (DELTASONE) 20 MG tablet, Take 2 tablets by mouth daily for 4 days, then 1 tablet daily for 3 days. Take 2  tablets by mouth prior to Gammagard infusion, Disp: 15 tablet, Rfl: 0  •  predniSONE (DELTASONE) 5 MG tablet, Take 1 tablet by mouth Daily., Disp: 30 tablet, Rfl: 1  •  promethazine-dextromethorphan (PROMETHAZINE-DM) 6.25-15 MG/5ML syrup, Take 5 mL by mouth 4 (Four) Times a Day As Needed for Cough., Disp: 240 mL, Rfl: 0  •  Spacer/Aero-Holding Chambers (AeroChamber Plus Wali-Vu) misc, Use as directed with albuterol inhaler, Disp: 1 each, Rfl: 0  •  tiotropium (SPIRIVA) 18 MCG per inhalation capsule, Place 1 capsule into inhaler and inhale Daily., Disp: , Rfl:   •  tiotropium bromide monohydrate (SPIRIVA RESPIMAT) 2.5 MCG/ACT aerosol solution inhaler, Inhale 2 puffs by mouth daily, Disp: 4 g, Rfl: 11  •  tiZANidine (ZANAFLEX) 2 MG tablet, Take 1 tablet by mouth Every Night., Disp: 90 tablet, Rfl: 1  •  ubrogepant (ubrogepant) 100 MG tablet, Take 1 tablet by mouth once per day as needed at onset of migraine, Disp: 10 tablet, Rfl: 11  •  valsartan (DIOVAN) 160 MG tablet, Take 1 tablet by mouth Daily., Disp: 90 tablet, Rfl: 3  •  vitamin B-12 (CYANOCOBALAMIN) 1000 MCG tablet, Take 1,000 mcg by mouth 2 (two) times a day., Disp: , Rfl:   •  Zoster Vac Recomb Adjuvanted 50 MCG/0.5ML reconstituted suspension, Inject as directed per protocol, Disp: 1 each, Rfl: 0  •  hydrOXYzine pamoate (VISTARIL) 50 MG capsule, Take 50 mg by mouth Every Night., Disp: , Rfl:   •  montelukast (SINGULAIR) 10 MG tablet, Take 1 tablet by mouth every night at bedtime., Disp: 30 tablet, Rfl: 11  •  vitamin C (ASCORBIC ACID) 250 MG tablet, Take 250 mg by mouth Daily., Disp: , Rfl:     Current Facility-Administered Medications:   •  heparin injection 500 Units, 5 mL, Intravenous, PRN, Sanjeev Rawls MD  •  sodium chloride 0.9 % flush 10 mL, 10 mL, Intravenous, Q12H, Sanjeev Rawls MD  •  sodium chloride 0.9 % flush 10 mL, 10 mL, Intravenous, PRN, Sanjeev Rawls MD  •  sodium chloride 0.9 % flush 20 mL, 20 mL, Intravenous, PRN, Sanjeev Rawls  "MD [place]    Allergies:   Allergies   Allergen Reactions   • Cefaclor Hives and Swelling     Other reaction(s): SWELLING  Shed 4 layers of skin and extremely SOB  Shed 4 layers of skin and extremely SOB     • Cephalosporins Hives, Angioedema and Swelling     Rechallenge with cefipime caused rash on 1/14/08.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     • Ambien [Zolpidem Tartrate] Mental Status Change   • Amoxicillin Rash   • Cardizem [Diltiazem Hcl] Swelling   • Erythromycin Nausea And Vomiting     gi upset    But she does tolerate azithromycin ok     • Hydrocodone GI Intolerance   • Lexapro [Escitalopram Oxalate] Other (See Comments)     Kidney Failure   • Metoclopramide Other (See Comments) and Mental Status Change     confusion  confusion     • Mobic [Meloxicam] Other (See Comments)     CKD   • Sumatriptan Other (See Comments) and Unknown - High Severity     Chest pain   Chest pain     • Topamax [Topiramate] Other (See Comments)     Memory loss and twitching.   • Vancomycin Rash     Large rash arms and thighs after pre op dose gone next day  Rash      • Verapamil Nausea And Vomiting     Dizzy   • Ciprofloxacin Rash     Tolerated Levaquin without difficulty.     • Clindamycin Hcl Nausea And Vomiting, Rash and Hives     Does not fully remember this allergy     • Edecrin [Ethacrynic Acid] Other (See Comments)     Weight gain   • Hydrochlorothiazide Hives   • Hydrocodone-Acetaminophen GI Intolerance   • Sulfa Antibiotics Hives       Objective     Physical Exam:  Vital Signs:   Vitals:    04/27/21 1014   BP: 110/70   BP Location: Left arm   Patient Position: Sitting   Cuff Size: Adult   Pulse: 94   Temp: 98.4 °F (36.9 °C)   SpO2: 96%   Weight: 116 kg (256 lb)   Height: 165.1 cm (65\")   PainSc: 0-No pain     Body mass index is 42.6 kg/m².    Physical Exam  Vitals and nursing note reviewed.   Constitutional:       General: She is not in acute distress.     Appearance: She is well-developed. " She is obese. She is not diaphoretic.   HENT:      Head: Normocephalic and atraumatic.   Eyes:      Conjunctiva/sclera: Conjunctivae normal.      Pupils: Pupils are equal, round, and reactive to light.   Pulmonary:      Effort: Pulmonary effort is normal. No respiratory distress.   Musculoskeletal:         General: Normal range of motion.   Skin:     General: Skin is warm and dry.      Findings: No rash.   Neurological:      Mental Status: She is alert and oriented to person, place, and time.   Psychiatric:         Mood and Affect: Mood normal.         Behavior: Behavior normal.         Thought Content: Thought content normal.         Judgment: Judgment normal.         Neurologic Exam     Mental Status   Oriented to person, place, and time.     Cranial Nerves     CN III, IV, VI   Pupils are equal, round, and reactive to light.       Assessment / Plan      Assessment/Plan:   Diagnoses and all orders for this visit:    1. Obstructive sleep apnea (Primary)  - Continue CPAP therapy.   - Advised patient to avoid driving if drowsy.   - Will talk to Sleep Lab staff and see why a split night study was not performed and only a titration study was done.     2. BMI 40.0-44.9, adult (CMS/[facility])       Follow Up:   Return in about 6 months (around 10/27/2021) for F/U Obstructive Sleep Apnea.    LILIBETH Cowart, FNP-C  [facility] Neurology and Sleep Medicine       Please note that portions of this note may have been completed with a voice recognition program. Efforts were made to edit the dictations, but occasionally words are mistranscribed.

## 2021-05-11 ENCOUNTER — OFFICE VISIT (OUTPATIENT)
Dept: INTERNAL MEDICINE | Facility: CLINIC | Age: 66
End: 2021-05-11

## 2021-05-11 VITALS
SYSTOLIC BLOOD PRESSURE: 112 MMHG | HEIGHT: 65 IN | DIASTOLIC BLOOD PRESSURE: 76 MMHG | HEART RATE: 93 BPM | BODY MASS INDEX: 43.15 KG/M2 | OXYGEN SATURATION: 91 % | TEMPERATURE: 97.8 F | WEIGHT: 259 LBS

## 2021-05-11 DIAGNOSIS — E66.01 MORBID OBESITY WITH BMI OF 40.0-44.9, ADULT (HCC): ICD-10-CM

## 2021-05-11 DIAGNOSIS — G72.9 POLYMYOPATHY: ICD-10-CM

## 2021-05-11 DIAGNOSIS — E11.9 TYPE 2 DIABETES MELLITUS WITHOUT COMPLICATION, WITHOUT LONG-TERM CURRENT USE OF INSULIN (HCC): ICD-10-CM

## 2021-05-11 DIAGNOSIS — I35.0 AORTIC VALVE STENOSIS, ETIOLOGY OF CARDIAC VALVE DISEASE UNSPECIFIED: ICD-10-CM

## 2021-05-11 DIAGNOSIS — N18.2 CHRONIC RENAL IMPAIRMENT, STAGE 2 (MILD): Primary | ICD-10-CM

## 2021-05-11 DIAGNOSIS — E03.9 ACQUIRED HYPOTHYROIDISM: ICD-10-CM

## 2021-05-11 PROCEDURE — 99214 OFFICE O/P EST MOD 30 MIN: CPT | Performed by: INTERNAL MEDICINE

## 2021-05-11 RX ORDER — ORAL SEMAGLUTIDE 3 MG/1
3 TABLET ORAL DAILY
Qty: 30 TABLET | Refills: 11 | Status: SHIPPED | OUTPATIENT
Start: 2021-05-11 | End: 2021-05-20 | Stop reason: SDUPTHER

## 2021-05-11 NOTE — PROGRESS NOTES
Subjective     Patient ID: Raquel Mariee is a 65 y.o. female. Patient is here for management of multiple medical problems.     Chief Complaint   Patient presents with   • Bladder Problem     overactive   • Cough     weeks; allergy induced     History of Present Illness       pred down to 5 mg  Doing well.  occ pain in top of feet and elbow.        The following portions of the patient's history were reviewed and updated as appropriate: allergies, current medications, past family history, past medical history, past social history, past surgical history and problem list.    Review of Systems    Current Outpatient Medications:   •  acetaminophen (TYLENOL) 325 MG tablet, Take 1-2 tablets by mouth prior to Gammagard Infusion, Disp: 10 tablet, Rfl: 11  •  albuterol sulfate  (90 Base) MCG/ACT inhaler, Inhale 2 puffs by mouth every 4-6 hours as needed for cough, wheeze, shortness of breath. Use with vortex spacer, Disp: 18 g, Rfl: 3  •  allopurinol (Zyloprim) 100 MG tablet, Take 1 tablet by mouth Daily., Disp: 90 tablet, Rfl: 3  •  amitriptyline (ELAVIL) 10 MG tablet, Take 10 mg by mouth 2 (Two) Times a Day., Disp: , Rfl:   •  Azelastine HCl 137 MCG/SPRAY solution, Instill 1-2 sprays in each nostril twice a day as needed, Disp: 30 mL, Rfl: 11  •  buPROPion SR (WELLBUTRIN SR) 150 MG 12 hr tablet, Take 1 tablet by mouth 2 (two) times a day., Disp: 180 tablet, Rfl: 3  •  calcium carbonate (Antacid Maximum) 1000 MG chewable tablet, Chew 500 mg 3 (Three) Times a Day., Disp: , Rfl:   •  CBD (cannabidiol) oral oil, Take  by mouth 2 (Two) Times a Day., Disp: , Rfl:   •  chlorhexidine (PERIDEX) 0.12 % solution, Swish 15 mL around mouth for 30 seconds, then spit out. Use twice daily as directed. , Disp: 473 mL, Rfl: 0  •  cholecalciferol (VITAMIN D3) 25 MCG (1000 UT) tablet, Take 1,000 Units by mouth Daily., Disp: , Rfl:   •  clonazePAM (KlonoPIN) 0.5 MG tablet, Take 1 tablet by mouth At Night As Needed, Disp: 30 tablet,  Rfl: 1  •  Dextromethorphan-guaiFENesin (Mucus-DM)  MG tablet sustained-release 12 hour, Take  by mouth., Disp: , Rfl:   •  dicyclomine (BENTYL) 10 MG capsule, Take 1 capsule by mouth 3 (Three) Times a Day., Disp: 270 capsule, Rfl: 3  •  diphenhydrAMINE (Benadryl Allergy) 25 MG tablet, Take 1-2 tablets by mouth Every 4-6 Hours As Needed., Disp: 90 tablet, Rfl: 11  •  EPINEPHrine (EPIPEN) 0.3 MG/0.3ML solution auto-injector injection, Use as directed for acute allergic reaction, Disp: 2 each, Rfl: 3  •  famotidine (PEPCID) 20 MG tablet, Take 20 mg by mouth 2 (Two) Times a Day., Disp: , Rfl:   •  ferrous sulfate 325 (65 Fe) MG tablet, Take  by mouth., Disp: , Rfl:   •  fexofenadine (ALLEGRA) 180 MG tablet, Take 180 mg by mouth Daily., Disp: , Rfl:   •  fluticasone (FLONASE) 50 MCG/ACT nasal spray, Instill 2 sprays into each nostril daily., Disp: 16 g, Rfl: 11  •  fluticasone (FLOVENT DISKUS) 50 MCG/BLIST diskus inhaler, Inhale 1 puff 2 (Two) Times a Day., Disp: , Rfl:   •  fluticasone-salmeterol (Advair HFA) 230-21 MCG/ACT inhaler, Inhale 2 puffs by mouth 2 (Two) Times a Day., Disp: 12 g, Rfl: 11  •  fluticasone-salmeterol (ADVAIR) 500-50 MCG/DOSE DISKUS, Inhale 1 puff., Disp: , Rfl:   •  folic acid (FOLVITE) 1 MG tablet, Take 1 tablet by mouth Daily., Disp: 90 tablet, Rfl: 3  •  furosemide (LASIX) 40 MG tablet, Take 1 tablet by mouth Daily., Disp: 90 tablet, Rfl: 3  •  glucose-vitamin C (Meijer Glucose) 4-6 GM-MG chewable tablet chewable tablet, Chew., Disp: , Rfl:   •  ipratropium (ATROVENT) 0.06 % nasal spray, Spray 2 sprays into the nostril(s) every night at bedtime as directed by provider, Disp: 15 mL, Rfl: 12  •  ipratropium-albuterol (DUO-NEB) 0.5-2.5 mg/3 ml nebulizer, Inhale contents of 1 vial (3mL) via nebulizer every 4-6 hours as needed for coughing, wheezing, or shortness of breath., Disp: 300 mL, Rfl: 3  •  isosorbide dinitrate (ISORDIL) 10 MG tablet, Take 1 tablet by mouth 3 (Three) Times a Day.,  Disp: 270 tablet, Rfl: 3  •  levothyroxine (SYNTHROID, LEVOTHROID) 50 MCG tablet, Take 50 mcg by mouth Daily., Disp: , Rfl:   •  Magnesium 250 MG tablet, Take 500 mg by mouth Daily. Two tablets, Disp: , Rfl:   •  melatonin 5 MG tablet tablet, Take 5 mg by mouth Every Night., Disp: , Rfl:   •  multivitamin with minerals (ONE-A-DAY PROACTIVE 65+ PO), Take 1 tablet by mouth Daily., Disp: , Rfl:   •  omeprazole (priLOSEC) 40 MG capsule, Take 1 capsule by mouth 2 (two) times a day., Disp: 90 capsule, Rfl: 3  •  ondansetron (ZOFRAN) 4 MG tablet, Take 4 mg by mouth Every 8 (Eight) Hours As Needed for Nausea or Vomiting., Disp: , Rfl:   •  potassium citrate (UROCIT-K) 10 MEQ (1080 MG) CR tablet, Take 1 tablet by mouth Daily., Disp: 90 tablet, Rfl: 3  •  predniSONE (DELTASONE) 5 MG tablet, Take 1 tablet by mouth Daily., Disp: 30 tablet, Rfl: 1  •  promethazine-dextromethorphan (PROMETHAZINE-DM) 6.25-15 MG/5ML syrup, Take 5 mL by mouth 4 (Four) Times a Day As Needed for Cough., Disp: 240 mL, Rfl: 0  •  Spacer/Aero-Holding Chambers (AeroChamber Plus Wali-Vu) misc, Use as directed with albuterol inhaler, Disp: 1 each, Rfl: 0  •  tiotropium bromide monohydrate (SPIRIVA RESPIMAT) 2.5 MCG/ACT aerosol solution inhaler, Inhale 2 puffs by mouth daily, Disp: 4 g, Rfl: 11  •  tiZANidine (ZANAFLEX) 2 MG tablet, Take 1 tablet by mouth Every Night., Disp: 90 tablet, Rfl: 1  •  ubrogepant (ubrogepant) 100 MG tablet, Take 1 tablet by mouth once per day as needed at onset of migraine, Disp: 10 tablet, Rfl: 11  •  valsartan (DIOVAN) 160 MG tablet, Take 1 tablet by mouth Daily., Disp: 90 tablet, Rfl: 3  •  vitamin B-12 (CYANOCOBALAMIN) 1000 MCG tablet, Take 1,000 mcg by mouth 2 (two) times a day., Disp: , Rfl:   •  azithromycin (Zithromax) 250 MG tablet, Take 4 tablets by mouth on day of dental surgery., Disp: 12 tablet, Rfl: 0  •  guaiFENesin-codeine (Virtussin A/C) 100-10 MG/5ML liquid, Take 5 mL by mouth 3 (Three) Times a Day As Needed for  "Cough., Disp: , Rfl:   •  methotrexate 2.5 MG tablet, Take 2 tablets by mouth 1 (One) Time Per Week., Disp: 8 tablet, Rfl: 5  •  montelukast (SINGULAIR) 10 MG tablet, Take 10 mg by mouth Every Night., Disp: , Rfl:   •  Semaglutide (Rybelsus) 3 MG tablet, Take 1 capsule by mouth Daily., Disp: 30 tablet, Rfl: 11    Current Facility-Administered Medications:   •  heparin injection 500 Units, 5 mL, Intravenous, PRN, Sanjeev Rawls MD  •  sodium chloride 0.9 % flush 10 mL, 10 mL, Intravenous, Q12H, Sanjeev Rawls MD  •  sodium chloride 0.9 % flush 10 mL, 10 mL, Intravenous, PRN, Sanjeev Rawls MD  •  sodium chloride 0.9 % flush 20 mL, 20 mL, Intravenous, PRN, Sanjeev Rawls MD    Objective      Blood pressure 112/76, pulse 93, temperature 97.8 °F (36.6 °C), height 165.1 cm (65\"), weight 117 kg (259 lb), SpO2 91 %.    Physical Exam     General Appearance:    Alert, cooperative, no distress, appears stated age   Head:    Normocephalic, without obvious abnormality, atraumatic   Eyes:    PERRL, conjunctiva/corneas clear, EOM's intact   Ears:    Normal TM's and external ear canals, both ears   Nose:   Nares normal, septum midline, mucosa normal, no drainage   or sinus tenderness   Throat:   Lips, mucosa, and tongue normal; teeth and gums normal   Neck:   Supple, symmetrical, trachea midline, no adenopathy;        thyroid:  No enlargement/tenderness/nodules; no carotid    bruit or JVD   Back:     Symmetric, no curvature, ROM normal, no CVA tenderness   Lungs:     Clear to auscultation bilaterally, respirations unlabored   Chest wall:    No tenderness or deformity   Heart:    Regular rate and rhythm, S1 and S2 normal, 3 murmur,        rub or gallop   Abdomen:     Soft, non-tender, bowel sounds active all four quadrants,     no masses, no organomegaly   Extremities:   Extremities normal, atraumatic, no cyanosis or edema   Pulses:   2+ and symmetric all extremities   Skin:   Skin color, texture, turgor normal, no " rashes or lesions   Lymph nodes:   Cervical, supraclavicular, and axillary nodes normal   Neurologic:   CNII-XII intact. Normal strength, sensation and reflexes       throughout      Results for orders placed or performed during the hospital encounter of 04/16/21   CK    Specimen: Blood   Result Value Ref Range    Creatine Kinase 140 20 - 180 U/L   Myoglobin, Serum    Specimen: Blood   Result Value Ref Range    Myoglobin 63.8 (H) 25.0 - 58.0 ng/mL         Assessment/Plan     Cri and myositis. Pt on less pred. Will reeval with labs.    Oab. Pee relief otc helping.      Diagnoses and all orders for this visit:    1. Chronic renal impairment, stage 2 (mild) (Primary)    2. Polymyopathy    3. Type 2 diabetes mellitus without complication, without long-term current use of insulin (CMS/[facility])  -     Semaglutide (Rybelsus) 3 MG tablet; Take 1 capsule by mouth Daily.  Dispense: 30 tablet; Refill: 11  -     Hemoglobin A1c    4. Acquired hypothyroidism    5. Morbid obesity with BMI of 40.0-44.9, adult (CMS/[facility])  -     Semaglutide (Rybelsus) 3 MG tablet; Take 1 capsule by mouth Daily.  Dispense: 30 tablet; Refill: 11    6. Aortic valve stenosis, etiology of cardiac valve disease unspecified  -     Ambulatory Referral to Cardiology      Return in about 4 weeks (around 6/8/2021) for Medicare Wellness.          There are no Patient Instructions on file for this visit.     Sanjeev Rawls MD    Assessment/Plan

## 2021-05-12 ENCOUNTER — PATIENT MESSAGE (OUTPATIENT)
Dept: INTERNAL MEDICINE | Facility: CLINIC | Age: 66
End: 2021-05-12

## 2021-05-12 DIAGNOSIS — D83.9 COMMON VARIABLE IMMUNODEFICIENCY (HCC): Primary | ICD-10-CM

## 2021-05-12 DIAGNOSIS — E11.9 TYPE 2 DIABETES MELLITUS WITHOUT COMPLICATION, WITHOUT LONG-TERM CURRENT USE OF INSULIN (HCC): ICD-10-CM

## 2021-05-12 DIAGNOSIS — E66.01 MORBID OBESITY WITH BMI OF 40.0-44.9, ADULT (HCC): ICD-10-CM

## 2021-05-12 RX ORDER — AMITRIPTYLINE HYDROCHLORIDE 10 MG/1
TABLET, FILM COATED ORAL
Qty: 90 TABLET | Refills: 3 | Status: SHIPPED | OUTPATIENT
Start: 2021-05-12 | End: 2021-11-02 | Stop reason: SDUPTHER

## 2021-05-14 ENCOUNTER — INFUSION (OUTPATIENT)
Dept: ONCOLOGY | Facility: HOSPITAL | Age: 66
End: 2021-05-14

## 2021-05-14 VITALS
HEART RATE: 95 BPM | SYSTOLIC BLOOD PRESSURE: 134 MMHG | DIASTOLIC BLOOD PRESSURE: 62 MMHG | RESPIRATION RATE: 16 BRPM | BODY MASS INDEX: 42.43 KG/M2 | OXYGEN SATURATION: 96 % | WEIGHT: 255 LBS | TEMPERATURE: 98.2 F

## 2021-05-14 DIAGNOSIS — Z95.828 PORT-A-CATH IN PLACE: ICD-10-CM

## 2021-05-14 DIAGNOSIS — D83.9 COMMON VARIABLE IMMUNODEFICIENCY (HCC): ICD-10-CM

## 2021-05-14 DIAGNOSIS — D80.1 COMMON VARIABLE AGAMMAGLOBULINEMIA (HCC): Primary | ICD-10-CM

## 2021-05-14 LAB
ALBUMIN SERPL-MCNC: 4.3 G/DL (ref 3.5–5.2)
ALBUMIN/GLOB SERPL: 2 G/DL
ALP SERPL-CCNC: 68 U/L (ref 39–117)
ALT SERPL W P-5'-P-CCNC: 15 U/L (ref 1–33)
ANION GAP SERPL CALCULATED.3IONS-SCNC: 8.5 MMOL/L (ref 5–15)
AST SERPL-CCNC: 24 U/L (ref 1–32)
BASOPHILS # BLD AUTO: 0.07 10*3/MM3 (ref 0–0.2)
BASOPHILS NFR BLD AUTO: 1.5 % (ref 0–1.5)
BILIRUB SERPL-MCNC: 0.3 MG/DL (ref 0–1.2)
BUN SERPL-MCNC: 13 MG/DL (ref 8–23)
BUN/CREAT SERPL: 10 (ref 7–25)
CALCIUM SPEC-SCNC: 9.2 MG/DL (ref 8.6–10.5)
CHLORIDE SERPL-SCNC: 96 MMOL/L (ref 98–107)
CK SERPL-CCNC: 199 U/L (ref 20–180)
CO2 SERPL-SCNC: 26.5 MMOL/L (ref 22–29)
CREAT SERPL-MCNC: 1.3 MG/DL (ref 0.57–1)
DEPRECATED RDW RBC AUTO: 49.1 FL (ref 37–54)
EOSINOPHIL # BLD AUTO: 0.19 10*3/MM3 (ref 0–0.4)
EOSINOPHIL NFR BLD AUTO: 4 % (ref 0.3–6.2)
ERYTHROCYTE [DISTWIDTH] IN BLOOD BY AUTOMATED COUNT: 13.8 % (ref 12.3–15.4)
GFR SERPL CREATININE-BSD FRML MDRD: 41 ML/MIN/1.73
GLOBULIN UR ELPH-MCNC: 2.2 GM/DL
GLUCOSE SERPL-MCNC: 135 MG/DL (ref 65–99)
HBA1C MFR BLD: 6.6 % (ref 4.8–5.6)
HCT VFR BLD AUTO: 36.9 % (ref 34–46.6)
HGB BLD-MCNC: 12.4 G/DL (ref 12–15.9)
IGG1 SER-MCNC: 907 MG/DL (ref 700–1600)
IMM GRANULOCYTES # BLD AUTO: 0.02 10*3/MM3 (ref 0–0.05)
IMM GRANULOCYTES NFR BLD AUTO: 0.4 % (ref 0–0.5)
LYMPHOCYTES # BLD AUTO: 1.52 10*3/MM3 (ref 0.7–3.1)
LYMPHOCYTES NFR BLD AUTO: 32.3 % (ref 19.6–45.3)
MCH RBC QN AUTO: 32.3 PG (ref 26.6–33)
MCHC RBC AUTO-ENTMCNC: 33.6 G/DL (ref 31.5–35.7)
MCV RBC AUTO: 96.1 FL (ref 79–97)
MONOCYTES # BLD AUTO: 0.55 10*3/MM3 (ref 0.1–0.9)
MONOCYTES NFR BLD AUTO: 11.7 % (ref 5–12)
MYOGLOBIN SERPL-MCNC: 82 NG/ML (ref 25–58)
NEUTROPHILS NFR BLD AUTO: 2.36 10*3/MM3 (ref 1.7–7)
NEUTROPHILS NFR BLD AUTO: 50.1 % (ref 42.7–76)
NRBC BLD AUTO-RTO: 0 /100 WBC (ref 0–0.2)
PLATELET # BLD AUTO: 261 10*3/MM3 (ref 140–450)
PMV BLD AUTO: 8.5 FL (ref 6–12)
POTASSIUM SERPL-SCNC: 4.5 MMOL/L (ref 3.5–5.2)
PROT SERPL-MCNC: 6.5 G/DL (ref 6–8.5)
RBC # BLD AUTO: 3.84 10*6/MM3 (ref 3.77–5.28)
SODIUM SERPL-SCNC: 131 MMOL/L (ref 136–145)
T4 FREE SERPL-MCNC: 1.18 NG/DL (ref 0.93–1.7)
TSH SERPL DL<=0.05 MIU/L-ACNC: 1.36 UIU/ML (ref 0.27–4.2)
VIT B12 BLD-MCNC: 1556 PG/ML (ref 211–946)
WBC # BLD AUTO: 4.71 10*3/MM3 (ref 3.4–10.8)

## 2021-05-14 PROCEDURE — 96366 THER/PROPH/DIAG IV INF ADDON: CPT

## 2021-05-14 PROCEDURE — 84439 ASSAY OF FREE THYROXINE: CPT | Performed by: INTERNAL MEDICINE

## 2021-05-14 PROCEDURE — 84443 ASSAY THYROID STIM HORMONE: CPT | Performed by: INTERNAL MEDICINE

## 2021-05-14 PROCEDURE — 83036 HEMOGLOBIN GLYCOSYLATED A1C: CPT | Performed by: INTERNAL MEDICINE

## 2021-05-14 PROCEDURE — 85025 COMPLETE CBC W/AUTO DIFF WBC: CPT

## 2021-05-14 PROCEDURE — 80053 COMPREHEN METABOLIC PANEL: CPT

## 2021-05-14 PROCEDURE — 82784 ASSAY IGA/IGD/IGG/IGM EACH: CPT

## 2021-05-14 PROCEDURE — 25010000002 IMMUNE GLOBULIN (HUMAN) 20 GM/200ML SOLUTION: Performed by: ALLERGY & IMMUNOLOGY

## 2021-05-14 PROCEDURE — 36415 COLL VENOUS BLD VENIPUNCTURE: CPT | Performed by: INTERNAL MEDICINE

## 2021-05-14 PROCEDURE — 82550 ASSAY OF CK (CPK): CPT

## 2021-05-14 PROCEDURE — 25010000002 HEPARIN LOCK FLUSH PER 10 UNITS: Performed by: INTERNAL MEDICINE

## 2021-05-14 PROCEDURE — 83874 ASSAY OF MYOGLOBIN: CPT | Performed by: INTERNAL MEDICINE

## 2021-05-14 PROCEDURE — 84207 ASSAY OF VITAMIN B-6: CPT | Performed by: INTERNAL MEDICINE

## 2021-05-14 PROCEDURE — 96365 THER/PROPH/DIAG IV INF INIT: CPT

## 2021-05-14 PROCEDURE — 82607 VITAMIN B-12: CPT | Performed by: INTERNAL MEDICINE

## 2021-05-14 RX ORDER — ACETAMINOPHEN 325 MG/1
325 TABLET ORAL ONCE
Status: DISCONTINUED | OUTPATIENT
Start: 2021-05-14 | End: 2021-05-14 | Stop reason: HOSPADM

## 2021-05-14 RX ORDER — HEPARIN SODIUM (PORCINE) LOCK FLUSH IV SOLN 100 UNIT/ML 100 UNIT/ML
500 SOLUTION INTRAVENOUS AS NEEDED
Status: DISCONTINUED | OUTPATIENT
Start: 2021-05-14 | End: 2021-05-14 | Stop reason: HOSPADM

## 2021-05-14 RX ORDER — PREDNISONE 20 MG/1
40 TABLET ORAL ONCE AS NEEDED
Status: CANCELLED
Start: 2021-06-11

## 2021-05-14 RX ORDER — ACETAMINOPHEN 325 MG/1
325 TABLET ORAL ONCE
Status: CANCELLED | OUTPATIENT
Start: 2021-06-11

## 2021-05-14 RX ORDER — SODIUM CHLORIDE 0.9 % (FLUSH) 0.9 %
10 SYRINGE (ML) INJECTION AS NEEDED
Status: DISCONTINUED | OUTPATIENT
Start: 2021-05-14 | End: 2021-05-14 | Stop reason: HOSPADM

## 2021-05-14 RX ORDER — PREDNISONE 20 MG/1
40 TABLET ORAL ONCE
Status: DISCONTINUED | OUTPATIENT
Start: 2021-05-14 | End: 2021-05-14 | Stop reason: HOSPADM

## 2021-05-14 RX ORDER — DIPHENHYDRAMINE HCL 25 MG
50 CAPSULE ORAL ONCE
Status: DISCONTINUED | OUTPATIENT
Start: 2021-05-14 | End: 2021-05-14 | Stop reason: HOSPADM

## 2021-05-14 RX ORDER — HEPARIN SODIUM (PORCINE) LOCK FLUSH IV SOLN 100 UNIT/ML 100 UNIT/ML
500 SOLUTION INTRAVENOUS AS NEEDED
Status: CANCELLED | OUTPATIENT
Start: 2021-05-14

## 2021-05-14 RX ORDER — DIPHENHYDRAMINE HCL 25 MG
50 CAPSULE ORAL ONCE AS NEEDED
Status: CANCELLED
Start: 2021-06-11

## 2021-05-14 RX ORDER — DIPHENHYDRAMINE HCL 25 MG
50 CAPSULE ORAL ONCE
Status: CANCELLED
Start: 2021-06-11 | End: 2021-06-11

## 2021-05-14 RX ORDER — SODIUM CHLORIDE 0.9 % (FLUSH) 0.9 %
10 SYRINGE (ML) INJECTION AS NEEDED
Status: CANCELLED | OUTPATIENT
Start: 2021-05-14

## 2021-05-14 RX ORDER — EPINEPHRINE 1 MG/ML
0.3 INJECTION, SOLUTION INTRAMUSCULAR; SUBCUTANEOUS ONCE AS NEEDED
Status: CANCELLED
Start: 2021-06-11

## 2021-05-14 RX ORDER — METHYLPREDNISOLONE SODIUM SUCCINATE 125 MG/2ML
50 INJECTION, POWDER, LYOPHILIZED, FOR SOLUTION INTRAMUSCULAR; INTRAVENOUS ONCE AS NEEDED
Status: CANCELLED
Start: 2021-06-11

## 2021-05-14 RX ORDER — PREDNISONE 20 MG/1
40 TABLET ORAL ONCE
Status: CANCELLED
Start: 2021-06-11 | End: 2021-06-11

## 2021-05-14 RX ADMIN — IMMUNE GLOBULIN INFUSION (HUMAN) 20 G: 100 INJECTION, SOLUTION INTRAVENOUS; SUBCUTANEOUS at 11:41

## 2021-05-14 RX ADMIN — HEPARIN 500 UNITS: 100 SYRINGE at 13:25

## 2021-05-14 RX ADMIN — IMMUNE GLOBULIN INFUSION (HUMAN) 20 G: 100 INJECTION, SOLUTION INTRAVENOUS; SUBCUTANEOUS at 09:26

## 2021-05-20 DIAGNOSIS — E11.9 TYPE 2 DIABETES MELLITUS WITHOUT COMPLICATION, WITHOUT LONG-TERM CURRENT USE OF INSULIN (HCC): ICD-10-CM

## 2021-05-20 DIAGNOSIS — E66.01 MORBID OBESITY WITH BMI OF 40.0-44.9, ADULT (HCC): ICD-10-CM

## 2021-05-20 LAB — VIT B6 SERPL-MCNC: 40.2 UG/L (ref 2–32.8)

## 2021-05-20 RX ORDER — ORAL SEMAGLUTIDE 3 MG/1
3 TABLET ORAL DAILY
Qty: 30 TABLET | Refills: 0 | Status: CANCELLED | COMMUNITY
Start: 2021-05-20

## 2021-05-20 RX ORDER — ORAL SEMAGLUTIDE 3 MG/1
3 TABLET ORAL DAILY
Qty: 30 TABLET | Refills: 0 | COMMUNITY
Start: 2021-05-20 | End: 2021-06-07

## 2021-05-20 NOTE — TELEPHONE ENCOUNTER
Patient stopped by for Rybelsus, samples provided, order pended for signature.           Lot: [id number]  NDC: 3758-3373-22  EXP: 04/30/23

## 2021-05-24 RX ORDER — ONDANSETRON 4 MG/1
4 TABLET, FILM COATED ORAL EVERY 8 HOURS PRN
Qty: 30 TABLET | Refills: 11 | Status: SHIPPED | OUTPATIENT
Start: 2021-05-24 | End: 2022-08-05

## 2021-05-25 RX ORDER — TIZANIDINE 2 MG/1
2 TABLET ORAL NIGHTLY
Qty: 90 TABLET | Refills: 1 | Status: CANCELLED | OUTPATIENT
Start: 2021-05-25

## 2021-05-26 RX ORDER — TIZANIDINE 2 MG/1
2 TABLET ORAL NIGHTLY
Qty: 90 TABLET | Refills: 1 | Status: SHIPPED | OUTPATIENT
Start: 2021-05-26 | End: 2022-02-12

## 2021-05-28 ENCOUNTER — TELEPHONE (OUTPATIENT)
Dept: INTERNAL MEDICINE | Facility: CLINIC | Age: 66
End: 2021-05-28

## 2021-05-28 RX ORDER — OMEPRAZOLE 40 MG/1
40 CAPSULE, DELAYED RELEASE ORAL 2 TIMES DAILY
Qty: 180 CAPSULE | Refills: 3 | Status: CANCELLED | OUTPATIENT
Start: 2021-05-28

## 2021-05-28 RX ORDER — TIZANIDINE 2 MG/1
2 TABLET ORAL EVERY 6 HOURS PRN
Qty: 360 TABLET | Refills: 1 | Status: CANCELLED | OUTPATIENT
Start: 2021-05-28

## 2021-05-28 NOTE — TELEPHONE ENCOUNTER
See previous message about medication dosage and needing refills. Patient states she has enough medication to last until Dr. Rawls is back in the office on Tuesday. But wanted him to be aware.

## 2021-05-28 NOTE — TELEPHONE ENCOUNTER
Caller: Raquel Mariee    Relationship: Self    Best call back number: [phone]    What medications are you currently taking:   Current Outpatient Medications on File Prior to Visit   Medication Sig Dispense Refill   • acetaminophen (TYLENOL) 325 MG tablet Take 1-2 tablets by mouth prior to [facility] 10 tablet 11   • albuterol sulfate  (90 Base) MCG/ACT inhaler Inhale 2 puffs by mouth every 4-6 hours as needed for cough, wheeze, shortness of breath. Use with vortex spacer 18 g 3   • allopurinol (Zyloprim) 100 MG tablet Take 1 tablet by mouth Daily. 90 tablet 3   • amitriptyline (ELAVIL) 10 MG tablet Take 2 tablets by mouth Every Evening. 90 tablet 3   • Azelastine HCl 137 MCG/SPRAY solution Instill 1-2 sprays in each nostril twice a day as needed 30 mL 11   • azithromycin (Zithromax) 250 MG tablet Take 4 tablets by mouth on day of dental surgery. 12 tablet 0   • buPROPion SR (WELLBUTRIN SR) 150 MG 12 hr tablet Take 1 tablet by mouth 2 (two) times a day. 180 tablet 3   • calcium carbonate (Antacid Maximum) 1000 MG chewable tablet Chew 500 mg 3 (Three) Times a Day.     • CBD (cannabidiol) oral oil Take  by mouth 2 (Two) Times a Day.     • chlorhexidine (PERIDEX) 0.12 % solution Swish 15 mL around mouth for 30 seconds, then spit out. Use twice daily as directed.  473 mL 0   • cholecalciferol (VITAMIN D3) 25 MCG (1000 UT) tablet Take 1,000 Units by mouth Daily.     • clonazePAM (KlonoPIN) 0.5 MG tablet Take 1 tablet by mouth At Night As Needed 30 tablet 1   • Dextromethorphan-guaiFENesin (Mucus-DM)  MG tablet sustained-release 12 hour Take  by mouth.     • dicyclomine (BENTYL) 10 MG capsule Take 1 capsule by mouth 3 (Three) Times a Day. 270 capsule 3   • diphenhydrAMINE (Benadryl Allergy) 25 MG tablet Take 1-2 tablets by mouth Every 4-6 Hours As Needed. 90 tablet 11   • EPINEPHrine (EPIPEN) 0.3 MG/0.3ML solution auto-injector injection Use as directed for acute allergic reaction 2 each 3   •  famotidine (PEPCID) 20 MG tablet Take 20 mg by mouth 2 (Two) Times a Day.     • ferrous sulfate 325 (65 Fe) MG tablet Take  by mouth.     • fexofenadine (ALLEGRA) 180 MG tablet Take 180 mg by mouth Daily.     • fluticasone (FLONASE) 50 MCG/ACT nasal spray Instill 2 sprays into each nostril daily. 16 g 11   • fluticasone (FLOVENT DISKUS) 50 MCG/BLIST diskus inhaler Inhale 1 puff 2 (Two) Times a Day.     • fluticasone-salmeterol (Advair HFA) 230-21 MCG/ACT inhaler Inhale 2 puffs by mouth 2 (Two) Times a Day. 12 g 11   • fluticasone-salmeterol (ADVAIR) 500-50 MCG/DOSE DISKUS Inhale 1 puff.     • folic acid (FOLVITE) 1 MG tablet Take 1 tablet by mouth Daily. 90 tablet 3   • furosemide (LASIX) 40 MG tablet Take 1 tablet by mouth Daily. 90 tablet 3   • glucose-vitamin C ([employer] Glucose) 4-6 GM-MG chewable tablet chewable tablet Chew.     • guaiFENesin-codeine (Virtussin A/C) 100-10 MG/5ML liquid Take 5 mL by mouth 3 (Three) Times a Day As Needed for Cough.     • ipratropium (ATROVENT) 0.06 % nasal spray Spray 2 sprays into the nostril(s) every night at bedtime as directed by provider 15 mL 12   • ipratropium-albuterol (DUO-NEB) 0.5-2.5 mg/3 ml nebulizer Inhale contents of 1 vial (3mL) via nebulizer every 4-6 hours as needed for coughing, wheezing, or shortness of breath. 300 mL 3   • isosorbide dinitrate (ISORDIL) 10 MG tablet Take 1 tablet by mouth 3 (Three) Times a Day. 270 tablet 3   • levothyroxine (SYNTHROID, LEVOTHROID) 50 MCG tablet Take 50 mcg by mouth Daily.     • Magnesium 250 MG tablet Take 500 mg by mouth Daily. Two tablets     • melatonin 5 MG tablet tablet Take 5 mg by mouth Every Night.     • methotrexate 2.5 MG tablet Take 2 tablets by mouth 1 (One) Time Per Week. 8 tablet 5   • montelukast (SINGULAIR) 10 MG tablet Take 10 mg by mouth Every Night.     • multivitamin with minerals (ONE-A-DAY PROACTIVE 65+ PO) Take 1 tablet by mouth Daily.     • omeprazole (priLOSEC) 40 MG capsule Take 1 capsule by mouth 2  (two) times a day. 90 capsule 3   • ondansetron (ZOFRAN) 4 MG tablet Take 1 tablet by mouth Every 8 (Eight) Hours As Needed for Nausea or Vomiting. 30 tablet 11   • potassium citrate (UROCIT-K) 10 MEQ (1080 MG) CR tablet Take 1 tablet by mouth Daily. 90 tablet 3   • predniSONE (DELTASONE) 5 MG tablet Take 1 tablet by mouth Daily. 30 tablet 1   • promethazine-dextromethorphan (PROMETHAZINE-DM) 6.25-15 MG/5ML syrup Take 5 mL by mouth 4 (Four) Times a Day As Needed for Cough. 240 mL 0   • Semaglutide (Rybelsus) 3 MG tablet Take 1 capsule by mouth Daily. 30 tablet 0   • Spacer/Aero-Holding Chambers (AeroChamber Plus Wali-Vu) misc Use as directed with albuterol inhaler 1 each 0   • tiotropium bromide monohydrate (SPIRIVA RESPIMAT) 2.5 MCG/ACT aerosol solution inhaler Inhale 2 puffs by mouth daily 4 g 11   • tiZANidine (ZANAFLEX) 2 MG tablet Take 1 tablet by mouth Every Night. 90 tablet 1   • ubrogepant (ubrogepant) 100 MG tablet Take 1 tablet by mouth once per day as needed at onset of migraine 10 tablet 11   • valsartan (DIOVAN) 160 MG tablet Take 1 tablet by mouth Daily. 90 tablet 3   • vitamin B-12 (CYANOCOBALAMIN) 1000 MCG tablet Take 1,000 mcg by mouth 2 (two) times a day.       Current Facility-Administered Medications on File Prior to Visit   Medication Dose Route Frequency Provider Last Rate Last Admin   • heparin injection 500 Units  5 mL Intravenous PRN Sanjeev Stanley MD       • sodium chloride 0.9 % flush 10 mL  10 mL Intravenous Q12H Sanjeev Stanley MD       • sodium chloride 0.9 % flush 10 mL  10 mL Intravenous PRN Sanjeev Stanley MD       • sodium chloride 0.9 % flush 20 mL  20 mL Intravenous PRN Sanjeev Stanley MD            When did you start taking these medications: NA    Which medication are you concerned about: omeprazole (priLOSEC) 40 MG capsule  AND tiZANidine (ZANAFLEX) 2 MG tablet      Who prescribed you this medication: DR STANLEY    What are your concerns: PATIENT STATES SHE IS PRESCRIBED  TO TAKE THIS MEDICATION TWICE A DAY AND WAS SUPPOSED TO HAVE A 90 DAY SUPPLY. PATIENT STATES SHE ONLY RECIEVED 90 CAPSULES OF THE OMEPRAZOLE. PATIENT STATES SHE IS PRESCRIBED TIZANIDINE FOR MIGRAINES. PATIENT STATES SHE IS TAKING THIS TWICE AT BED TIME AND HAVING LESS MIGRAINES. PATIENT STATES SHE DOES NEED SOME EXTRA FOR WHEN SHE D SHE HAS THE INFUSIONS BECAUSE SHE IS TAKING THIS BEFORE AND AFTER THE INFUSION DUE TO HAVING A MIGRAINE AFTER THE INFUSION. PATIENT STATES ON INFUSION DAYS SHE SOMETIMES HAS TO TAKE THIS EVERY 6.     How long have you been taking these medications: NA    How long have you had these concerns: NA

## 2021-06-01 RX ORDER — OMEPRAZOLE 40 MG/1
40 CAPSULE, DELAYED RELEASE ORAL 2 TIMES DAILY
Qty: 90 CAPSULE | Refills: 3 | Status: SHIPPED | OUTPATIENT
Start: 2021-06-01 | End: 2021-07-14 | Stop reason: SDUPTHER

## 2021-06-01 RX ORDER — TIZANIDINE 2 MG/1
2 TABLET ORAL NIGHTLY
Qty: 90 TABLET | Refills: 1 | Status: SHIPPED | OUTPATIENT
Start: 2021-06-01 | End: 2021-06-02 | Stop reason: SDUPTHER

## 2021-06-02 ENCOUNTER — TELEPHONE (OUTPATIENT)
Dept: INTERNAL MEDICINE | Facility: CLINIC | Age: 66
End: 2021-06-02

## 2021-06-02 RX ORDER — TIZANIDINE 4 MG/1
4 TABLET ORAL NIGHTLY
Qty: 90 TABLET | Refills: 1 | Status: SHIPPED | OUTPATIENT
Start: 2021-06-02 | End: 2022-03-22 | Stop reason: SDUPTHER

## 2021-06-02 RX ORDER — TIZANIDINE 2 MG/1
TABLET ORAL
Qty: 180 TABLET | Refills: 1 | Status: CANCELLED | OUTPATIENT
Start: 2021-06-02

## 2021-06-02 NOTE — TELEPHONE ENCOUNTER
Caller: Raquel Mariee    Relationship: Self    Best call back number: [phone]    What medications are you currently taking:   Current Outpatient Medications on File Prior to Visit   Medication Sig Dispense Refill   • acetaminophen (TYLENOL) 325 MG tablet Take 1-2 tablets by mouth prior to [facility] 10 tablet 11   • albuterol sulfate  (90 Base) MCG/ACT inhaler Inhale 2 puffs by mouth every 4-6 hours as needed for cough, wheeze, shortness of breath. Use with vortex spacer 18 g 3   • allopurinol (Zyloprim) 100 MG tablet Take 1 tablet by mouth Daily. 90 tablet 3   • amitriptyline (ELAVIL) 10 MG tablet Take 2 tablets by mouth Every Evening. 90 tablet 3   • Azelastine HCl 137 MCG/SPRAY solution Instill 1-2 sprays in each nostril twice a day as needed 30 mL 11   • azithromycin (Zithromax) 250 MG tablet Take 4 tablets by mouth on day of dental surgery. 12 tablet 0   • buPROPion SR (WELLBUTRIN SR) 150 MG 12 hr tablet Take 1 tablet by mouth 2 (two) times a day. 180 tablet 3   • calcium carbonate (Antacid Maximum) 1000 MG chewable tablet Chew 500 mg 3 (Three) Times a Day.     • CBD (cannabidiol) oral oil Take  by mouth 2 (Two) Times a Day.     • chlorhexidine (PERIDEX) 0.12 % solution Swish 15 mL around mouth for 30 seconds, then spit out. Use twice daily as directed.  473 mL 0   • cholecalciferol (VITAMIN D3) 25 MCG (1000 UT) tablet Take 1,000 Units by mouth Daily.     • clonazePAM (KlonoPIN) 0.5 MG tablet Take 1 tablet by mouth At Night As Needed 30 tablet 1   • Dextromethorphan-guaiFENesin (Mucus-DM)  MG tablet sustained-release 12 hour Take  by mouth.     • dicyclomine (BENTYL) 10 MG capsule Take 1 capsule by mouth 3 (Three) Times a Day. 270 capsule 3   • diphenhydrAMINE (Benadryl Allergy) 25 MG tablet Take 1-2 tablets by mouth Every 4-6 Hours As Needed. 90 tablet 11   • EPINEPHrine (EPIPEN) 0.3 MG/0.3ML solution auto-injector injection Use as directed for acute allergic reaction 2 each 3   •  famotidine (PEPCID) 20 MG tablet Take 20 mg by mouth 2 (Two) Times a Day.     • ferrous sulfate 325 (65 Fe) MG tablet Take  by mouth.     • fexofenadine (ALLEGRA) 180 MG tablet Take 180 mg by mouth Daily.     • fluticasone (FLONASE) 50 MCG/ACT nasal spray Instill 2 sprays into each nostril daily. 16 g 11   • fluticasone (FLOVENT DISKUS) 50 MCG/BLIST diskus inhaler Inhale 1 puff 2 (Two) Times a Day.     • fluticasone-salmeterol (Advair HFA) 230-21 MCG/ACT inhaler Inhale 2 puffs by mouth 2 (Two) Times a Day. 12 g 11   • fluticasone-salmeterol (ADVAIR) 500-50 MCG/DOSE DISKUS Inhale 1 puff.     • folic acid (FOLVITE) 1 MG tablet Take 1 tablet by mouth Daily. 90 tablet 3   • furosemide (LASIX) 40 MG tablet Take 1 tablet by mouth Daily. 90 tablet 3   • glucose-vitamin C ([facility] Glucose) 4-6 GM-MG chewable tablet chewable tablet Chew.     • guaiFENesin-codeine (Virtussin A/C) 100-10 MG/5ML liquid Take 5 mL by mouth 3 (Three) Times a Day As Needed for Cough.     • ipratropium (ATROVENT) 0.06 % nasal spray Spray 2 sprays into the nostril(s) every night at bedtime as directed by provider 15 mL 12   • ipratropium-albuterol (DUO-NEB) 0.5-2.5 mg/3 ml nebulizer Inhale contents of 1 vial (3mL) via nebulizer every 4-6 hours as needed for coughing, wheezing, or shortness of breath. 300 mL 3   • isosorbide dinitrate (ISORDIL) 10 MG tablet Take 1 tablet by mouth 3 (Three) Times a Day. 270 tablet 3   • levothyroxine (SYNTHROID, LEVOTHROID) 50 MCG tablet Take 50 mcg by mouth Daily.     • Magnesium 250 MG tablet Take 500 mg by mouth Daily. Two tablets     • melatonin 5 MG tablet tablet Take 5 mg by mouth Every Night.     • methotrexate 2.5 MG tablet Take 2 tablets by mouth 1 (One) Time Per Week. 8 tablet 5   • montelukast (SINGULAIR) 10 MG tablet Take 10 mg by mouth Every Night.     • multivitamin with minerals (ONE-A-DAY PROACTIVE 65+ PO) Take 1 tablet by mouth Daily.     • omeprazole (priLOSEC) 40 MG capsule Take 1 capsule by mouth 2  (two) times a day. 90 capsule 3   • ondansetron (ZOFRAN) 4 MG tablet Take 1 tablet by mouth Every 8 (Eight) Hours As Needed for Nausea or Vomiting. 30 tablet 11   • potassium citrate (UROCIT-K) 10 MEQ (1080 MG) CR tablet Take 1 tablet by mouth Daily. 90 tablet 3   • predniSONE (DELTASONE) 5 MG tablet Take 1 tablet by mouth Daily. 30 tablet 1   • promethazine-dextromethorphan (PROMETHAZINE-DM) 6.25-15 MG/5ML syrup Take 5 mL by mouth 4 (Four) Times a Day As Needed for Cough. 240 mL 0   • Semaglutide (Rybelsus) 3 MG tablet Take 1 capsule by mouth Daily. 30 tablet 0   • Spacer/Aero-Holding Chambers (AeroChamber Plus Wali-Vu) misc Use as directed with albuterol inhaler 1 each 0   • tiotropium bromide monohydrate (SPIRIVA RESPIMAT) 2.5 MCG/ACT aerosol solution inhaler Inhale 2 puffs by mouth daily 4 g 11   • tiZANidine (ZANAFLEX) 2 MG tablet Take 1 tablet by mouth Every Night. 90 tablet 1   • ubrogepant (ubrogepant) 100 MG tablet Take 1 tablet by mouth once per day as needed at onset of migraine 10 tablet 11   • valsartan (DIOVAN) 160 MG tablet Take 1 tablet by mouth Daily. 90 tablet 3   • vitamin B-12 (CYANOCOBALAMIN) 1000 MCG tablet Take 1,000 mcg by mouth 2 (two) times a day.       Current Facility-Administered Medications on File Prior to Visit   Medication Dose Route Frequency Provider Last Rate Last Admin   • heparin injection 500 Units  5 mL Intravenous PRN Sanjeev Stanley MD       • sodium chloride 0.9 % flush 10 mL  10 mL Intravenous Q12H Sanjeev Stanley MD       • sodium chloride 0.9 % flush 10 mL  10 mL Intravenous PRN Sanjeev Stanley MD       • sodium chloride 0.9 % flush 20 mL  20 mL Intravenous PRN Sanjeev Stanley MD         Which medication are you concerned about: tiZANidine (ZANAFLEX) 2 MG tablet    Who prescribed you this medication: LIZETH     What are your concerns: PATIENT STATED THAT SHE DISCUSSED WITH DR. STANLEY TAKING 2 AT NIGHT AND THE PATIENT TAKES MORE ON HER INFUSION DAYS: PATIENT TAKES  ONE EVERY 4 TO 6 HOURS.  PATIENT STATES THAT THE PRESCRIPTION THAT WAS SENT TO THE PHARMACY DOES NOT HAVE AN UPDATES PRESCRIPTION FOR HER MEDICATION INCREASE.

## 2021-06-03 ENCOUNTER — TELEPHONE (OUTPATIENT)
Dept: CARDIOLOGY | Facility: CLINIC | Age: 66
End: 2021-06-03

## 2021-06-03 ENCOUNTER — OFFICE VISIT (OUTPATIENT)
Dept: CARDIOLOGY | Facility: CLINIC | Age: 66
End: 2021-06-03

## 2021-06-03 VITALS
WEIGHT: 254.2 LBS | HEIGHT: 65 IN | OXYGEN SATURATION: 97 % | HEART RATE: 96 BPM | DIASTOLIC BLOOD PRESSURE: 82 MMHG | BODY MASS INDEX: 42.35 KG/M2 | SYSTOLIC BLOOD PRESSURE: 138 MMHG

## 2021-06-03 DIAGNOSIS — I50.32 CHRONIC DIASTOLIC HEART FAILURE (HCC): ICD-10-CM

## 2021-06-03 DIAGNOSIS — N18.30 STAGE 3 CHRONIC KIDNEY DISEASE, UNSPECIFIED WHETHER STAGE 3A OR 3B CKD (HCC): ICD-10-CM

## 2021-06-03 DIAGNOSIS — I20.1 PRINZMETAL'S ANGINA (HCC): ICD-10-CM

## 2021-06-03 DIAGNOSIS — I35.0 NONRHEUMATIC AORTIC VALVE STENOSIS: Primary | ICD-10-CM

## 2021-06-03 PROCEDURE — 99213 OFFICE O/P EST LOW 20 MIN: CPT | Performed by: NURSE PRACTITIONER

## 2021-06-03 NOTE — TELEPHONE ENCOUNTER
Maggie, will you see if you can't get her records for abdominal aortagram? That she had done at a CT surgeon office in February 2021?  Thanks!

## 2021-06-03 NOTE — PROGRESS NOTES
"             [facility] Office Follow Up Note    Raquel Mariee  [id number]  06/03/2021    Primary Care Provider: Sanjeev Rawls MD   Referring Provider: No ref. provider found    Chief Complaint: Regular follow-up    History of Present Illness:   Mrs. Raquel Mariee is a 65 y.o. female who presents to the [facility]iology [facility] for regular follow-up.   The patient has a past medical history significant for common variable immunodeficiency on chronic IVIG infusions, stage III chronic kidney disease, hypothyroidism, anemia, type 2 diabetes mellitus, hypertension, and an aortic aneurysm reportedly measuring approximately 4.0 cm on last assessment.  She has a past cardiac history significant for chronic diastolic heart failure, Prinzmetal's angina maintained on isosorbide, and mild aortic stenosis based on echocardiogram in 2019.  She was initially seen in 12/20 to reestablish care after moving from [place].    At that time, the patient reported she was doing well from a cardiac standpoint.  She denied significant episodes of chest pain while on isosorbide.  She did reported mild lower extremity edema, but was maintained on twice daily Lasix per nephrology.  She reported a history of flash pulmonary edema shortly after receiving IVIG infusions.  A repeat echocardiogram confirmed mild aortic stenosis.  She presents for regular follow-up today.  She reports feeling well from a cardiac standpoint.  She reports she has not had any fluid in her legs for \"quite a while\".  She states that Dr. Rawls is now managing her Lasix, because she has not had any issues with declining kidney function.  The patient reports that she has had a single episode of chest pain 3 weeks ago when she was feeling \"stressed\".  She notes that this was in the center of her chest and lasted only 3 to 4 seconds with spontaneous resolution.  She characterizes it as \"sharp\" and rates it 4/10 on the pain scale.  Patient reports " "that she sat quietly for approximately 5 minutes without recurrence of pain.  She denies any associated symptoms including shortness of breath, diaphoresis, nausea, vomiting.  She reports that she took 1 sublingual nitroglycerin tablet.  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  In addition, she reports dizziness with standing first thing in the morning.  She reports this is improved by sitting on the edge of her bed for a few minutes.  She reports taking her blood pressure twice a week at home with her results being 120s/60s.  She reports \"heartburn\" with isosorbide last a few minutes and is usually relieved by eating something such as bread.  Otherwise, she has been taking her prescribed medications without perceived side effects.  He offers no other complaints or concerns at this time.    Past Cardiac Testin. Last Coronary Angio: None  2. Prior Stress Testing: Unknown  3. Last Echo: 12/15/2020   1.  Normal left ventricular size and systolic function, LVEF 60-65%.   2.  Mild concentric LVH.   3.  Normal LV diastolic filling pattern.   4.  Normal right ventricular size and systolic function.   5.  Mild calcification of the aortic valve with mild aortic stenosis (mean gradient 15 mmHg, max velocity 254 cm/s)   6.  Trace aortic regurgitation.   7.  Trace MR and TR.  4. Prior Holter Monitor: None      Review of Systems:   Review of Systems   Constitutional: Negative for activity change, chills, diaphoresis, fatigue, fever and unexpected weight gain.   Eyes: Negative for blurred vision and visual disturbance.   Respiratory: Positive for cough and shortness of breath. Negative for apnea, chest tightness and wheezing.         SOB Secondary to asthma, she says.  Cough has intermittent chapman to yellow sputum   Cardiovascular: Positive for chest pain. Negative for palpitations and leg swelling.   Gastrointestinal: Negative for abdominal distention, blood in " stool, GERD and indigestion.   Endocrine: Negative for cold intolerance and heat intolerance.   Genitourinary: Negative for hematuria.   Musculoskeletal: Negative for gait problem, joint swelling and myalgias.   Skin: Negative for color change, pallor and bruise.   Neurological: Negative for dizziness, seizures, syncope, weakness, light-headedness, numbness, headache and confusion.   Hematological: Does not bruise/bleed easily.   Psychiatric/Behavioral: Negative for behavioral problems, sleep disturbance, suicidal ideas and depressed mood.     I have reviewed and confirmed the accuracy of the ROS as documented by the MA/LPN/RN LILIBETH Boyd    I have reviewed and/or updated the patient's past medical, past surgical, family, social history, problem list and allergies as appropriate.     Medications:     Current Outpatient Medications:   •  acetaminophen (TYLENOL) 325 MG tablet, Take 1-2 tablets by mouth prior to Gammagard Infusion, Disp: 10 tablet, Rfl: 11  •  albuterol sulfate  (90 Base) MCG/ACT inhaler, Inhale 2 puffs by mouth every 4-6 hours as needed for cough, wheeze, shortness of breath. Use with vortex spacer, Disp: 18 g, Rfl: 3  •  allopurinol (Zyloprim) 100 MG tablet, Take 1 tablet by mouth Daily., Disp: 90 tablet, Rfl: 3  •  amitriptyline (ELAVIL) 10 MG tablet, Take 2 tablets by mouth Every Evening., Disp: 90 tablet, Rfl: 3  •  Azelastine HCl 137 MCG/SPRAY solution, Instill 1-2 sprays in each nostril twice a day as needed, Disp: 30 mL, Rfl: 11  •  azithromycin (Zithromax) 250 MG tablet, Take 4 tablets by mouth on day of dental surgery., Disp: 12 tablet, Rfl: 0  •  buPROPion SR (WELLBUTRIN SR) 150 MG 12 hr tablet, Take 1 tablet by mouth 2 (two) times a day., Disp: 180 tablet, Rfl: 3  •  calcium carbonate (Antacid Maximum) 1000 MG chewable tablet, Chew 500 mg 3 (Three) Times a Day., Disp: , Rfl:   •  CBD (cannabidiol) oral oil, Take  by mouth 2 (Two) Times a Day., Disp: , Rfl:   •   chlorhexidine (PERIDEX) 0.12 % solution, Swish 15 mL around mouth for 30 seconds, then spit out. Use twice daily as directed. , Disp: 473 mL, Rfl: 0  •  cholecalciferol (VITAMIN D3) 25 MCG (1000 UT) tablet, Take 1,000 Units by mouth Daily., Disp: , Rfl:   •  clonazePAM (KlonoPIN) 0.5 MG tablet, Take 1 tablet by mouth At Night As Needed, Disp: 30 tablet, Rfl: 1  •  Dextromethorphan-guaiFENesin (Mucus-DM)  MG tablet sustained-release 12 hour, Take  by mouth., Disp: , Rfl:   •  dicyclomine (BENTYL) 10 MG capsule, Take 1 capsule by mouth 3 (Three) Times a Day., Disp: 270 capsule, Rfl: 3  •  diphenhydrAMINE (Benadryl Allergy) 25 MG tablet, Take 1-2 tablets by mouth Every 4-6 Hours As Needed., Disp: 90 tablet, Rfl: 11  •  EPINEPHrine (EPIPEN) 0.3 MG/0.3ML solution auto-injector injection, Use as directed for acute allergic reaction, Disp: 2 each, Rfl: 3  •  famotidine (PEPCID) 20 MG tablet, Take 20 mg by mouth 2 (Two) Times a Day., Disp: , Rfl:   •  ferrous sulfate 325 (65 Fe) MG tablet, Take  by mouth., Disp: , Rfl:   •  fexofenadine (ALLEGRA) 180 MG tablet, Take 180 mg by mouth Daily., Disp: , Rfl:   •  fluticasone-salmeterol (Advair HFA) 230-21 MCG/ACT inhaler, Inhale 2 puffs by mouth 2 (Two) Times a Day., Disp: 12 g, Rfl: 11  •  folic acid (FOLVITE) 1 MG tablet, Take 1 tablet by mouth Daily., Disp: 90 tablet, Rfl: 3  •  furosemide (LASIX) 40 MG tablet, Take 1 tablet by mouth Daily., Disp: 90 tablet, Rfl: 3  •  ipratropium (ATROVENT) 0.06 % nasal spray, Spray 2 sprays into the nostril(s) every night at bedtime as directed by provider, Disp: 15 mL, Rfl: 12  •  ipratropium-albuterol (DUO-NEB) 0.5-2.5 mg/3 ml nebulizer, Inhale contents of 1 vial (3mL) via nebulizer every 4-6 hours as needed for coughing, wheezing, or shortness of breath., Disp: 300 mL, Rfl: 3  •  isosorbide dinitrate (ISORDIL) 10 MG tablet, Take 1 tablet by mouth 3 (Three) Times a Day., Disp: 270 tablet, Rfl: 3  •  levothyroxine (SYNTHROID,  LEVOTHROID) 50 MCG tablet, Take 50 mcg by mouth Daily., Disp: , Rfl:   •  Magnesium 250 MG tablet, Take 500 mg by mouth Daily. Two tablets, Disp: , Rfl:   •  melatonin 5 MG tablet tablet, Take 5 mg by mouth Every Night., Disp: , Rfl:   •  methotrexate 2.5 MG tablet, Take 2 tablets by mouth 1 (One) Time Per Week., Disp: 8 tablet, Rfl: 5  •  montelukast (SINGULAIR) 10 MG tablet, Take 10 mg by mouth Every Night., Disp: , Rfl:   •  multivitamin with minerals (ONE-A-DAY PROACTIVE 65+ PO), Take 1 tablet by mouth Daily., Disp: , Rfl:   •  omeprazole (priLOSEC) 40 MG capsule, Take 1 capsule by mouth 2 (two) times a day., Disp: 90 capsule, Rfl: 3  •  ondansetron (ZOFRAN) 4 MG tablet, Take 1 tablet by mouth Every 8 (Eight) Hours As Needed for Nausea or Vomiting., Disp: 30 tablet, Rfl: 11  •  potassium citrate (UROCIT-K) 10 MEQ (1080 MG) CR tablet, Take 1 tablet by mouth Daily., Disp: 90 tablet, Rfl: 3  •  predniSONE (DELTASONE) 5 MG tablet, Take 1 tablet by mouth Daily., Disp: 30 tablet, Rfl: 1  •  promethazine-dextromethorphan (PROMETHAZINE-DM) 6.25-15 MG/5ML syrup, Take 5 mL by mouth 4 (Four) Times a Day As Needed for Cough., Disp: 240 mL, Rfl: 0  •  Semaglutide (Rybelsus) 3 MG tablet, Take 1 capsule by mouth Daily., Disp: 30 tablet, Rfl: 0  •  Spacer/Aero-Holding Chambers (AeroChamber Plus Wali-Vu) misc, Use as directed with albuterol inhaler, Disp: 1 each, Rfl: 0  •  tiotropium bromide monohydrate (SPIRIVA RESPIMAT) 2.5 MCG/ACT aerosol solution inhaler, Inhale 2 puffs by mouth daily, Disp: 4 g, Rfl: 11  •  tiZANidine (ZANAFLEX) 4 MG tablet, Take 1 tablet by mouth Every Night., Disp: 90 tablet, Rfl: 1  •  ubrogepant (ubrogepant) 100 MG tablet, Take 1 tablet by mouth once per day as needed at onset of migraine, Disp: 10 tablet, Rfl: 11  •  valsartan (DIOVAN) 160 MG tablet, Take 1 tablet by mouth Daily., Disp: 90 tablet, Rfl: 3  •  vitamin B-12 (CYANOCOBALAMIN) 1000 MCG tablet, Take 1,000 mcg by mouth 2 (two) times a day.,  "Disp: , Rfl:   •  fluticasone (FLONASE) 50 MCG/ACT nasal spray, Instill 2 sprays into each nostril daily., Disp: 16 g, Rfl: 11  •  fluticasone (FLOVENT DISKUS) 50 MCG/BLIST diskus inhaler, Inhale 1 puff 2 (Two) Times a Day., Disp: , Rfl:   •  fluticasone-salmeterol (ADVAIR) 500-50 MCG/DOSE DISKUS, Inhale 1 puff., Disp: , Rfl:   •  glucose-vitamin C ([employer] Glucose) 4-6 GM-MG chewable tablet chewable tablet, Chew., Disp: , Rfl:   •  guaiFENesin-codeine (Virtussin A/C) 100-10 MG/5ML liquid, Take 5 mL by mouth 3 (Three) Times a Day As Needed for Cough., Disp: , Rfl:     Current Facility-Administered Medications:   •  heparin injection 500 Units, 5 mL, Intravenous, PRN, Sanjeev Rawls MD  •  sodium chloride 0.9 % flush 10 mL, 10 mL, Intravenous, Q12H, Sanjeev Rawls MD  •  sodium chloride 0.9 % flush 10 mL, 10 mL, Intravenous, PRN, Sanjeev Rawls MD  •  sodium chloride 0.9 % flush 20 mL, 20 mL, Intravenous, PRN, Sanjeev Rawls MD    Physical Exam:  Vital Signs:   Vitals:    06/03/21 1355 06/03/21 1418   BP: 160/88 138/82   BP Location: Left arm Left arm   Patient Position: Sitting Sitting   Cuff Size: Adult Adult   Pulse: 96    SpO2: 97%    Weight: 115 kg (254 lb 3.2 oz)    Height: 165.1 cm (65\")      Body mass index is 42.3 kg/m².    Physical Exam  Vitals and nursing note reviewed.   Constitutional:       General: She is not in acute distress.     Appearance: Normal appearance. She is well-developed. She is obese. She is not ill-appearing, toxic-appearing or diaphoretic.      Comments: BMI 42.3   HENT:      Head: Normocephalic and atraumatic.      Mouth/Throat:      Comments: Mask  Eyes:      General: No scleral icterus.     Extraocular Movements: Extraocular movements intact.   Neck:      Trachea: Trachea normal.      Comments: Normal JVD  Cardiovascular:      Rate and Rhythm: Normal rate and regular rhythm.      Pulses: Normal pulses.      Heart sounds: Murmur heard.   No friction rub. No gallop.  " "  Pulmonary:      Effort: Pulmonary effort is normal. No respiratory distress.      Breath sounds: Normal breath sounds. No stridor. No wheezing, rhonchi or rales.   Abdominal:      Palpations: Abdomen is soft.      Tenderness: There is no abdominal tenderness.   Musculoskeletal:         General: Normal range of motion.      Cervical back: Neck supple.      Right lower leg: No edema.      Left lower leg: No edema.   Skin:     General: Skin is warm and dry.      Findings: No bruising, lesion or rash.   Neurological:      Mental Status: She is alert and oriented to person, place, and time.      Motor: No weakness.      Gait: Gait normal.   Psychiatric:         Mood and Affect: Mood normal.         Behavior: Behavior normal. Behavior is cooperative.         Thought Content: Thought content does not include suicidal ideation.         Results Review:   I reviewed the patient's new clinical results.      Assessment / Plan:     1. Aortic stenosis  --History of mild aortic stenosis as well as mild AI on echocardiogram in 2019  --Murmur on exam today consistent with mild aortic stenosis  --Appears to be asymptomatic  --Recent echocardiogram shows no significant change (mean gradient 15 mmHg, max velocity 254 cm/s)     2. Chronic diastolic heart failure (CMS/HCC)  --Reported history of chronic diastolic heart failure in the setting of underlying CKD  --Appears to be near euvolemic on exam today  --Continue Lasix at current dosing (currently managed by Dr. Rawls)     3.  Prinzmetal angina   --Currently chest pain-free with symptoms well controlled  --Continue isosorbide as antianginal     4. Abdominal aortic aneurysm (AAA)  --Patient reports \"no aneurysm\" per CT surgeon (?) imaging in 2/21  --Requested records     5. Common variable immunodeficiency   --Receives monthly IVIG infusions     8. Chronic renal impairment, stage 3b  --Followed by nephrology      Preventative Cardiology:   Tobacco Cessation: N/A   Obstructive Sleep " Apnea Screening: Completed  Advance Care Planning: ACP discussion was held with the patient during this visit. Patient does not have an advance directive, information provided.     Follow Up:   Return in about 6 months (around 12/3/2021) for Follow-up with Dr. Lovelace.      Thank you for allowing me to participate in the care of your patient. Please to not hesitate to contact me with additional questions or concerns.     Lula Sharpe, APRN

## 2021-06-07 ENCOUNTER — OFFICE VISIT (OUTPATIENT)
Dept: INTERNAL MEDICINE | Facility: CLINIC | Age: 66
End: 2021-06-07

## 2021-06-07 VITALS
HEART RATE: 91 BPM | OXYGEN SATURATION: 96 % | HEIGHT: 65 IN | BODY MASS INDEX: 42.15 KG/M2 | WEIGHT: 253 LBS | RESPIRATION RATE: 16 BRPM | SYSTOLIC BLOOD PRESSURE: 128 MMHG | DIASTOLIC BLOOD PRESSURE: 78 MMHG | TEMPERATURE: 98.2 F

## 2021-06-07 DIAGNOSIS — I35.9 AORTIC VALVE DISEASE: ICD-10-CM

## 2021-06-07 DIAGNOSIS — M33.20 POLYMYOSITIS (HCC): ICD-10-CM

## 2021-06-07 DIAGNOSIS — M35.3 PMR (POLYMYALGIA RHEUMATICA) (HCC): ICD-10-CM

## 2021-06-07 DIAGNOSIS — E66.01 MORBID OBESITY WITH BMI OF 40.0-44.9, ADULT (HCC): ICD-10-CM

## 2021-06-07 DIAGNOSIS — E87.1 HYPONATREMIA: ICD-10-CM

## 2021-06-07 DIAGNOSIS — E11.9 TYPE 2 DIABETES MELLITUS WITHOUT COMPLICATION, WITHOUT LONG-TERM CURRENT USE OF INSULIN (HCC): ICD-10-CM

## 2021-06-07 DIAGNOSIS — Z00.00 ROUTINE GENERAL MEDICAL EXAMINATION AT A HEALTH CARE FACILITY: ICD-10-CM

## 2021-06-07 DIAGNOSIS — K21.9 GASTROESOPHAGEAL REFLUX DISEASE WITHOUT ESOPHAGITIS: Primary | ICD-10-CM

## 2021-06-07 PROCEDURE — 1170F FXNL STATUS ASSESSED: CPT | Performed by: INTERNAL MEDICINE

## 2021-06-07 PROCEDURE — 99214 OFFICE O/P EST MOD 30 MIN: CPT | Performed by: INTERNAL MEDICINE

## 2021-06-07 PROCEDURE — 1159F MED LIST DOCD IN RCRD: CPT | Performed by: INTERNAL MEDICINE

## 2021-06-07 PROCEDURE — G0439 PPPS, SUBSEQ VISIT: HCPCS | Performed by: INTERNAL MEDICINE

## 2021-06-07 RX ORDER — ALBUTEROL SULFATE 90 UG/1
AEROSOL, METERED RESPIRATORY (INHALATION)
Qty: 8.5 G | Refills: 3 | Status: SHIPPED | OUTPATIENT
Start: 2021-06-07 | End: 2023-03-13

## 2021-06-07 RX ORDER — ORAL SEMAGLUTIDE 7 MG/1
7 TABLET ORAL DAILY
Qty: 90 TABLET | Refills: 3 | Status: SHIPPED | OUTPATIENT
Start: 2021-06-07 | End: 2021-06-30

## 2021-06-07 RX ORDER — ORAL SEMAGLUTIDE 3 MG/1
6 TABLET ORAL DAILY
Qty: 60 TABLET | Refills: 11 | COMMUNITY
Start: 2021-06-07 | End: 2021-06-30

## 2021-06-07 RX ORDER — ALBUTEROL SULFATE 90 UG/1
AEROSOL, METERED RESPIRATORY (INHALATION)
Qty: 18 G | Refills: 3 | Status: SHIPPED | OUTPATIENT
Start: 2021-06-07 | End: 2021-06-07 | Stop reason: SDUPTHER

## 2021-06-07 NOTE — PROGRESS NOTES
QUICK REFERENCE INFORMATION:  The ABCs of the Annual Wellness Visit    Medicare Annual Wellness Visit    Subjective   History of Present Illness    Raquel Mariee is a 65 y.o. female who presents for an Annual Wellness Visit. In addition, we addressed the following health issues:*dm        PMH, PSH, SocHx, FamHx, Allergies, and Medications: Reviewed and updated.     Outpatient Medications Prior to Visit   Medication Sig Dispense Refill   • acetaminophen (TYLENOL) 325 MG tablet Take 1-2 tablets by mouth prior to [facility] Infusion 10 tablet 11   • allopurinol (Zyloprim) 100 MG tablet Take 1 tablet by mouth Daily. 90 tablet 3   • amitriptyline (ELAVIL) 10 MG tablet Take 2 tablets by mouth Every Evening. 90 tablet 3   • Azelastine HCl 137 MCG/SPRAY solution Instill 1-2 sprays in each nostril twice a day as needed 30 mL 11   • azithromycin (Zithromax) 250 MG tablet Take 4 tablets by mouth on day of dental surgery. 12 tablet 0   • buPROPion SR (WELLBUTRIN SR) 150 MG 12 hr tablet Take 1 tablet by mouth 2 (two) times a day. 180 tablet 3   • calcium carbonate (Antacid Maximum) 1000 MG chewable tablet Chew 500 mg 3 (Three) Times a Day.     • CBD (cannabidiol) oral oil Take  by mouth 2 (Two) Times a Day.     • chlorhexidine (PERIDEX) 0.12 % solution Swish 15 mL around mouth for 30 seconds, then spit out. Use twice daily as directed.  473 mL 0   • cholecalciferol (VITAMIN D3) 25 MCG (1000 UT) tablet Take 1,000 Units by mouth Daily.     • clonazePAM (KlonoPIN) 0.5 MG tablet Take 1 tablet by mouth At Night As Needed 30 tablet 1   • Dextromethorphan-guaiFENesin (Mucus-DM)  MG tablet sustained-release 12 hour Take  by mouth.     • dicyclomine (BENTYL) 10 MG capsule Take 1 capsule by mouth 3 (Three) Times a Day. 270 capsule 3   • diphenhydrAMINE (Benadryl Allergy) 25 MG tablet Take 1-2 tablets by mouth Every 4-6 Hours As Needed. 90 tablet 11   • EPINEPHrine (EPIPEN) 0.3 MG/0.3ML solution auto-injector injection Use as  directed for acute allergic reaction 2 each 3   • famotidine (PEPCID) 20 MG tablet Take 20 mg by mouth 2 (Two) Times a Day.     • ferrous sulfate 325 (65 Fe) MG tablet Take  by mouth.     • fexofenadine (ALLEGRA) 180 MG tablet Take 180 mg by mouth Daily.     • fluticasone (FLONASE) 50 MCG/ACT nasal spray Instill 2 sprays into each nostril daily. 16 g 11   • fluticasone (FLOVENT DISKUS) 50 MCG/BLIST diskus inhaler Inhale 1 puff 2 (Two) Times a Day.     • fluticasone-salmeterol (Advair HFA) 230-21 MCG/ACT inhaler Inhale 2 puffs by mouth 2 (Two) Times a Day. 12 g 11   • fluticasone-salmeterol (ADVAIR) 500-50 MCG/DOSE DISKUS Inhale 1 puff.     • folic acid (FOLVITE) 1 MG tablet Take 1 tablet by mouth Daily. 90 tablet 3   • furosemide (LASIX) 40 MG tablet Take 1 tablet by mouth Daily. 90 tablet 3   • glucose-vitamin C ([employer] Glucose) 4-6 GM-MG chewable tablet chewable tablet Chew.     • guaiFENesin-codeine (Virtussin A/C) 100-10 MG/5ML liquid Take 5 mL by mouth 3 (Three) Times a Day As Needed for Cough.     • ipratropium (ATROVENT) 0.06 % nasal spray Spray 2 sprays into the nostril(s) every night at bedtime as directed by provider 15 mL 12   • ipratropium-albuterol (DUO-NEB) 0.5-2.5 mg/3 ml nebulizer Inhale contents of 1 vial (3mL) via nebulizer every 4-6 hours as needed for coughing, wheezing, or shortness of breath. 300 mL 3   • isosorbide dinitrate (ISORDIL) 10 MG tablet Take 1 tablet by mouth 3 (Three) Times a Day. 270 tablet 3   • levothyroxine (SYNTHROID, LEVOTHROID) 50 MCG tablet Take 50 mcg by mouth Daily.     • Magnesium 250 MG tablet Take 500 mg by mouth Daily. Two tablets     • melatonin 5 MG tablet tablet Take 5 mg by mouth Every Night.     • methotrexate 2.5 MG tablet Take 2 tablets by mouth 1 (One) Time Per Week. 8 tablet 5   • montelukast (SINGULAIR) 10 MG tablet Take 10 mg by mouth Every Night.     • multivitamin with minerals (ONE-A-DAY PROACTIVE 65+ PO) Take 1 tablet by mouth Daily.     • omeprazole  (priLOSEC) 40 MG capsule Take 1 capsule by mouth 2 (two) times a day. 90 capsule 3   • ondansetron (ZOFRAN) 4 MG tablet Take 1 tablet by mouth Every 8 (Eight) Hours As Needed for Nausea or Vomiting. 30 tablet 11   • potassium citrate (UROCIT-K) 10 MEQ (1080 MG) CR tablet Take 1 tablet by mouth Daily. 90 tablet 3   • predniSONE (DELTASONE) 5 MG tablet Take 1 tablet by mouth Daily. 30 tablet 1   • promethazine-dextromethorphan (PROMETHAZINE-DM) 6.25-15 MG/5ML syrup Take 5 mL by mouth 4 (Four) Times a Day As Needed for Cough. 240 mL 0   • Spacer/Aero-Holding Chambers (AeroChamber Plus Wali-Vu) misc Use as directed with albuterol inhaler 1 each 0   • tiotropium bromide monohydrate (SPIRIVA RESPIMAT) 2.5 MCG/ACT aerosol solution inhaler Inhale 2 puffs by mouth daily 4 g 11   • tiZANidine (ZANAFLEX) 4 MG tablet Take 1 tablet by mouth Every Night. 90 tablet 1   • ubrogepant (ubrogepant) 100 MG tablet Take 1 tablet by mouth once per day as needed at onset of migraine 10 tablet 11   • valsartan (DIOVAN) 160 MG tablet Take 1 tablet by mouth Daily. 90 tablet 3   • vitamin B-12 (CYANOCOBALAMIN) 1000 MCG tablet Take 1,000 mcg by mouth 2 (two) times a day.     • albuterol sulfate  (90 Base) MCG/ACT inhaler Inhale 2 puffs by mouth every 4-6 hours as needed for cough, wheeze, shortness of breath. Use with vortex spacer 18 g 3   • Semaglutide (Rybelsus) 3 MG tablet Take 1 capsule by mouth Daily. 30 tablet 0     Facility-Administered Medications Prior to Visit   Medication Dose Route Frequency Provider Last Rate Last Admin   • heparin injection 500 Units  5 mL Intravenous PRN Sanjeev Rawls MD       • sodium chloride 0.9 % flush 10 mL  10 mL Intravenous Q12H Sanjeev Rawls MD       • sodium chloride 0.9 % flush 10 mL  10 mL Intravenous PRN Sanjeev Rawls MD       • sodium chloride 0.9 % flush 20 mL  20 mL Intravenous PRN Sanjeev Rawls MD           Patient Active Problem List   Diagnosis   • Hypogammaglobulinemia  (CMS/[facility])   • Gastric bypass status for obesity   • CKD (chronic kidney disease), stage III (CMS/[facility])   • KALYN (obstructive sleep apnea)   • Major depressive disorder in partial remission (CMS/[facility])   • Hypothyroidism   • Fibromyalgia syndrome   • Abdominal aortic aneurysm (AAA) without rupture (CMS/HCC)   • Nonrheumatic aortic valve stenosis   • Vitamin D deficiency, unspecified    • Anatomical narrow angle borderline glaucoma   • Abnormal finding of blood chemistry, unspecified    • Chronic diastolic heart failure (CMS/[facility])   • Prinzmetal's angina (CMS/[facility])   • Common variable agammaglobulinemia (CMS/[facility])   • Polymyositis (CMS/[facility])   • Port-A-Cath in place   • Abnormal CT of the chest   • Acute bronchospasm   • Acute kidney injury (CMS/[facility])   • Sinusitis, chronic   • Anemia   • Aortic valve stenosis and insufficiency, rheumatic   • Body mass index (BMI) of 40.0 to 44.9 in adult (CMS/[facility])   • Calcium kidney stones   • Choroidal nevus of left eye   • CAD (coronary artery disease)   • Claw toe, acquired   • Combined forms of age-related cataract of both eyes   • Depressive disorder   • Essential hypertension with goal blood pressure less than 140/90   • Severe persistent asthma with exacerbation   • Glaucoma suspect of both eyes   • Inappropriate sinus tachycardia   • Migraine without aura   • Mitral valve regurgitation   • Osteoarthrosis involving lower leg   • Pain in joint involving lower leg   • Posterior vitreous detachment of both eyes   • Primary osteoarthritis of left wrist   • Anxiety disorder   • Status post total knee replacement   • Sleep disturbances   • Senile nuclear sclerosis   • Rotator cuff syndrome of left shoulder   • Pure hypercholesterolemia   • Thoracic aortic aneurysm without rupture (CMS/[facility])   • TIA (transient ischemic attack)   • Tracheomalacia   • Trochlear nerve palsy, left   • Type 2 diabetes mellitus without complication (CMS/[facility])       Health Habits:  Dental Exam. up to date  Eye Exam.  up to date  No exam data present  Exercise: 7 times/week.  Current exercise activities include: walking    Health Risk Assessment:  The patient has completed a Health Risk Assessment. This has been reviewed with them and has been scanned into the patient's chart.    Current Medical Providers:  Patient Care Team:  Sanjeev Rawls MD as PCP - General (Internal Medicine)    The [facility] providers who are involved in the care of this patient are listed above. Additional providers and suppliers are listed below:  DR Lovelace.  Dr Johnson.  Dr Acuna.      Recent Hospitalizations:  No hospitalization(s) within the last year..    Recent Lab Results:  CMP:  Lab Results   Component Value Date     (H) 03/01/2021    BUN 13 05/14/2021    CREATININE 1.30 (H) 05/14/2021    EGFRIFNONA 41 (L) 05/14/2021    EGFRIFAFRI 41 (L) 03/01/2021    BCR 10.0 05/14/2021     (L) 05/14/2021    K 4.5 05/14/2021    CO2 26.5 05/14/2021    CALCIUM 9.2 05/14/2021    PROTENTOTREF 7.2 12/01/2020    ALBUMIN 4.30 05/14/2021    LABGLOBREF 2.4 12/01/2020    LABGLOBREF 3.1 12/01/2020    LABIL2 2.0 12/01/2020    LABIL2 1.3 12/01/2020    BILITOT 0.3 05/14/2021    ALKPHOS 68 05/14/2021    AST 24 05/14/2021    ALT 15 05/14/2021       LIPID PANEL:  Lab Results   Component Value Date    CHLPL 229 (H) 12/01/2020    TRIG 115 12/01/2020    HDL 85 (H) 12/01/2020    VLDL 20 12/01/2020     (H) 12/01/2020       HbA1c:  Lab Results   Component Value Date    HGBA1C 6.60 (H) 05/14/2021       URINE MICROALBUMIN:  No results found for: MICROALBUR, POCMALB    PSA:  No results found for: PSA    Age-appropriate Screening Schedule:  Refer to the list below for future screening recommendations based on patient's age, sex and/or medical conditions. Orders for these recommended tests are listed in the plan section. The patient has been provided with a written plan.    Health Maintenance   Topic Date Due   • URINE MICROALBUMIN  Never done   • DXA SCAN  Never  done   • DIABETIC FOOT EXAM  Never done   • ZOSTER VACCINE (2 of 2) 03/17/2021   • MAMMOGRAM  07/01/2021   • INFLUENZA VACCINE  08/01/2021   • HEMOGLOBIN A1C  11/14/2021   • LIPID PANEL  12/01/2021   • DIABETIC EYE EXAM  04/14/2022   • TDAP/TD VACCINES (4 - Td or Tdap) 10/22/2024       Depression Screen:   PHQ-2/PHQ-9 Depression Screening 6/7/2021   Little interest or pleasure in doing things 0   Feeling down, depressed, or hopeless 0   Total Score 0         Functional and Cognitive Screening:  Functional & Cognitive Status 6/7/2021   Do you have difficulty preparing food and eating? No   Do you have difficulty bathing yourself, getting dressed or grooming yourself? No   Do you have difficulty using the toilet? No   Do you have difficulty moving around from place to place? No   Do you have trouble with steps or getting out of a bed or a chair? Yes   Current Diet Well Balanced Diet   Dental Exam Up to date   Eye Exam Up to date   Exercise (times per week) 2 times per week   Current Exercise Activities Include Walking   Do you need help using the phone?  No   Are you deaf or do you have serious difficulty hearing?  Yes   Do you need help with transportation? Yes   Do you need help shopping? No   Do you need help preparing meals?  No   Do you need help with housework?  No   Do you need help with laundry? No   Do you need help taking your medications? No   Do you need help managing money? Yes   Do you ever drive or ride in a car without wearing a seat belt? No   Have you felt unusual stress, anger or loneliness in the last month? Yes   Who do you live with? Spouse   If you need help, do you have trouble finding someone available to you? No   Have you been bothered in the last four weeks by sexual problems? No   Do you have difficulty concentrating, remembering or making decisions? Yes       Does the patient have evidence of cognitive impairment? No    Advanced Care Planning:  ACP discussion was held with the patient  "during this visit. Patient does not have an advance directive, information provided.    Identification of Risk Factors:  Risk factors include: Abdominal Aortic Aneurysm Screening  Inactivity/Sedentary.    Review of Systems    Compared to one year ago, the patient feels his physical health is better.  Compared to one year ago, the patient feels his mental health is better.    Objective     Physical Exam    Vitals:    06/07/21 1036   BP: 128/78   Pulse: 91   Resp: 16   Temp: 98.2 °F (36.8 °C)   SpO2: 96%   Weight: 115 kg (253 lb)   Height: 165.1 cm (65\")   PainSc: 0-No pain       Body mass index is 42.1 kg/m².  Discussed the patient's BMI with her. The BMI is in the acceptable range.    Assessment/Plan   Patient Self-Management and Personalized Health Advice  The patient has been provided with information about: diet, exercise, weight management and fall prevention and preventive services including:   · Annual Wellness Visit (AWV).    Visit Diagnoses:    ICD-10-CM ICD-9-CM   1. Gastroesophageal reflux disease without esophagitis  K21.9 530.81   2. Morbid obesity with BMI of 40.0-44.9, adult (CMS/[facility])  E66.01 278.01    Z68.41 V85.41   3. Type 2 diabetes mellitus without complication, without long-term current use of insulin (CMS/[facility])  E11.9 250.00   4. Hyponatremia  E87.1 276.1   5. Routine general medical examination at a health care facility  Z00.00 V70.0   6. Aortic valve disease  I35.9 424.1       Orders Placed This Encounter   Procedures   • Hemoglobin A1c     Order Specific Question:   Release to patient     Answer:   Immediate   • Comprehensive Metabolic Panel     Order Specific Question:   Release to patient     Answer:   Immediate   • Uric Acid     Order Specific Question:   Release to patient     Answer:   Immediate   • Ambulatory Referral to Cardiology     Referral Priority:   Routine     Referral Type:   Consultation     Referral Reason:   Specialty Services Required     Referred to Provider:   " Britany Puente MD     Requested Specialty:   Cardiology     Number of Visits Requested:   1   • CBC & Differential     Order Specific Question:   Manual Differential     Answer:   No       Outpatient Encounter Medications as of 6/7/2021   Medication Sig Dispense Refill   • acetaminophen (TYLENOL) 325 MG tablet Take 1-2 tablets by mouth prior to [facility] Infusion 10 tablet 11   • albuterol sulfate  (90 Base) MCG/ACT inhaler Inhale 2 puffs by mouth every 4-6 hours as needed for cough, wheeze, shortness of breath. Use with vortex spacer 18 g 3   • allopurinol (Zyloprim) 100 MG tablet Take 1 tablet by mouth Daily. 90 tablet 3   • amitriptyline (ELAVIL) 10 MG tablet Take 2 tablets by mouth Every Evening. 90 tablet 3   • Azelastine HCl 137 MCG/SPRAY solution Instill 1-2 sprays in each nostril twice a day as needed 30 mL 11   • azithromycin (Zithromax) 250 MG tablet Take 4 tablets by mouth on day of dental surgery. 12 tablet 0   • buPROPion SR (WELLBUTRIN SR) 150 MG 12 hr tablet Take 1 tablet by mouth 2 (two) times a day. 180 tablet 3   • calcium carbonate (Antacid Maximum) 1000 MG chewable tablet Chew 500 mg 3 (Three) Times a Day.     • CBD (cannabidiol) oral oil Take  by mouth 2 (Two) Times a Day.     • chlorhexidine (PERIDEX) 0.12 % solution Swish 15 mL around mouth for 30 seconds, then spit out. Use twice daily as directed.  473 mL 0   • cholecalciferol (VITAMIN D3) 25 MCG (1000 UT) tablet Take 1,000 Units by mouth Daily.     • clonazePAM (KlonoPIN) 0.5 MG tablet Take 1 tablet by mouth At Night As Needed 30 tablet 1   • Dextromethorphan-guaiFENesin (Mucus-DM)  MG tablet sustained-release 12 hour Take  by mouth.     • dicyclomine (BENTYL) 10 MG capsule Take 1 capsule by mouth 3 (Three) Times a Day. 270 capsule 3   • diphenhydrAMINE (Benadryl Allergy) 25 MG tablet Take 1-2 tablets by mouth Every 4-6 Hours As Needed. 90 tablet 11   • EPINEPHrine (EPIPEN) 0.3 MG/0.3ML solution auto-injector injection  Use as directed for acute allergic reaction 2 each 3   • famotidine (PEPCID) 20 MG tablet Take 20 mg by mouth 2 (Two) Times a Day.     • ferrous sulfate 325 (65 Fe) MG tablet Take  by mouth.     • fexofenadine (ALLEGRA) 180 MG tablet Take 180 mg by mouth Daily.     • fluticasone (FLONASE) 50 MCG/ACT nasal spray Instill 2 sprays into each nostril daily. 16 g 11   • fluticasone (FLOVENT DISKUS) 50 MCG/BLIST diskus inhaler Inhale 1 puff 2 (Two) Times a Day.     • fluticasone-salmeterol (Advair HFA) 230-21 MCG/ACT inhaler Inhale 2 puffs by mouth 2 (Two) Times a Day. 12 g 11   • fluticasone-salmeterol (ADVAIR) 500-50 MCG/DOSE DISKUS Inhale 1 puff.     • folic acid (FOLVITE) 1 MG tablet Take 1 tablet by mouth Daily. 90 tablet 3   • furosemide (LASIX) 40 MG tablet Take 1 tablet by mouth Daily. 90 tablet 3   • glucose-vitamin C ([employer] Glucose) 4-6 GM-MG chewable tablet chewable tablet Chew.     • guaiFENesin-codeine (Virtussin A/C) 100-10 MG/5ML liquid Take 5 mL by mouth 3 (Three) Times a Day As Needed for Cough.     • ipratropium (ATROVENT) 0.06 % nasal spray Spray 2 sprays into the nostril(s) every night at bedtime as directed by provider 15 mL 12   • ipratropium-albuterol (DUO-NEB) 0.5-2.5 mg/3 ml nebulizer Inhale contents of 1 vial (3mL) via nebulizer every 4-6 hours as needed for coughing, wheezing, or shortness of breath. 300 mL 3   • isosorbide dinitrate (ISORDIL) 10 MG tablet Take 1 tablet by mouth 3 (Three) Times a Day. 270 tablet 3   • levothyroxine (SYNTHROID, LEVOTHROID) 50 MCG tablet Take 50 mcg by mouth Daily.     • Magnesium 250 MG tablet Take 500 mg by mouth Daily. Two tablets     • melatonin 5 MG tablet tablet Take 5 mg by mouth Every Night.     • methotrexate 2.5 MG tablet Take 2 tablets by mouth 1 (One) Time Per Week. 8 tablet 5   • montelukast (SINGULAIR) 10 MG tablet Take 10 mg by mouth Every Night.     • multivitamin with minerals (ONE-A-DAY PROACTIVE 65+ PO) Take 1 tablet by mouth Daily.     •  omeprazole (priLOSEC) 40 MG capsule Take 1 capsule by mouth 2 (two) times a day. 90 capsule 3   • ondansetron (ZOFRAN) 4 MG tablet Take 1 tablet by mouth Every 8 (Eight) Hours As Needed for Nausea or Vomiting. 30 tablet 11   • potassium citrate (UROCIT-K) 10 MEQ (1080 MG) CR tablet Take 1 tablet by mouth Daily. 90 tablet 3   • predniSONE (DELTASONE) 5 MG tablet Take 1 tablet by mouth Daily. 30 tablet 1   • promethazine-dextromethorphan (PROMETHAZINE-DM) 6.25-15 MG/5ML syrup Take 5 mL by mouth 4 (Four) Times a Day As Needed for Cough. 240 mL 0   • Spacer/Aero-Holding Chambers (AeroChamber Plus Wali-Vu) misc Use as directed with albuterol inhaler 1 each 0   • tiotropium bromide monohydrate (SPIRIVA RESPIMAT) 2.5 MCG/ACT aerosol solution inhaler Inhale 2 puffs by mouth daily 4 g 11   • tiZANidine (ZANAFLEX) 4 MG tablet Take 1 tablet by mouth Every Night. 90 tablet 1   • ubrogepant (ubrogepant) 100 MG tablet Take 1 tablet by mouth once per day as needed at onset of migraine 10 tablet 11   • valsartan (DIOVAN) 160 MG tablet Take 1 tablet by mouth Daily. 90 tablet 3   • vitamin B-12 (CYANOCOBALAMIN) 1000 MCG tablet Take 1,000 mcg by mouth 2 (two) times a day.     • [DISCONTINUED] albuterol sulfate  (90 Base) MCG/ACT inhaler Inhale 2 puffs by mouth every 4-6 hours as needed for cough, wheeze, shortness of breath. Use with vortex spacer 18 g 3   • [DISCONTINUED] Semaglutide (Rybelsus) 3 MG tablet Take 1 capsule by mouth Daily. 30 tablet 0   • Semaglutide (Rybelsus) 3 MG tablet Take 6 mg by mouth Daily. 60 tablet 11   • Semaglutide (Rybelsus) 7 MG tablet Take 7 mg by mouth Daily. 90 tablet 3     Facility-Administered Encounter Medications as of 6/7/2021   Medication Dose Route Frequency Provider Last Rate Last Admin   • heparin injection 500 Units  5 mL Intravenous PRN Sanjeev Rawls MD       • sodium chloride 0.9 % flush 10 mL  10 mL Intravenous Q12H Sanjeev Rawls MD       • sodium chloride 0.9 % flush 10 mL   10 mL Intravenous PRN Sanjeev Rawls MD       • sodium chloride 0.9 % flush 20 mL  20 mL Intravenous PRN Sanjeev Rawls MD           Reviewed use of high risk medication in the elderly: yes  Reviewed for potential of harmful drug interactions in the elderly: yes    Follow Up:  Return in about 6 weeks (around 7/19/2021).     An After Visit Summary and PPPS with all of these plans were given to the patient.             Diagnoses and all orders for this visit:    1. Gastroesophageal reflux disease without esophagitis (Primary)  -     Hemoglobin A1c  -     Comprehensive Metabolic Panel  -     CBC & Differential  -     Uric Acid    2. Morbid obesity with BMI of 40.0-44.9, adult (CMS/[facility])  -     Hemoglobin A1c  -     Comprehensive Metabolic Panel  -     CBC & Differential  -     Uric Acid    3. Type 2 diabetes mellitus without complication, without long-term current use of insulin (CMS/[facility])  -     Semaglutide (Rybelsus) 7 MG tablet; Take 7 mg by mouth Daily.  Dispense: 90 tablet; Refill: 3  -     Semaglutide (Rybelsus) 3 MG tablet; Take 6 mg by mouth Daily.  Dispense: 60 tablet; Refill: 11  -     Hemoglobin A1c  -     Comprehensive Metabolic Panel  -     CBC & Differential  -     Uric Acid    4. Hyponatremia  -     Hemoglobin A1c  -     Comprehensive Metabolic Panel  -     CBC & Differential  -     Uric Acid    5. Routine general medical examination at a health care facility    6. Aortic valve disease  -     Ambulatory Referral to Cardiology    Other orders  -     albuterol sulfate  (90 Base) MCG/ACT inhaler; Inhale 2 puffs by mouth every 4-6 hours as needed for cough, wheeze, shortness of breath. Use with vortex spacer  Dispense: 18 g; Refill: 3

## 2021-06-07 NOTE — PROGRESS NOTES
Subjective     Patient ID: Raquel Mariee is a 65 y.o. female. Patient is here for management of multiple medical problems.     Chief Complaint   Patient presents with   • Medicare Wellness-subsequent     History of Present Illness   DM  Stable        Wt loss going well.   Nausea worse.        The following portions of the patient's history were reviewed and updated as appropriate: allergies, current medications, past family history, past medical history, past social history, past surgical history and problem list.    Review of Systems   Constitutional: Positive for fatigue.   HENT: Negative for dental problem and drooling.    Psychiatric/Behavioral: Negative for self-injury and sleep disturbance. The patient is not nervous/anxious.        Current Outpatient Medications:   •  acetaminophen (TYLENOL) 325 MG tablet, Take 1-2 tablets by mouth prior to [facility], Disp: 10 tablet, Rfl: 11  •  albuterol sulfate  (90 Base) MCG/ACT inhaler, Inhale 2 puffs by mouth every 4-6 hours as needed for cough, wheeze, shortness of breath. Use with vortex spacer, Disp: 18 g, Rfl: 3  •  allopurinol (Zyloprim) 100 MG tablet, Take 1 tablet by mouth Daily., Disp: 90 tablet, Rfl: 3  •  amitriptyline (ELAVIL) 10 MG tablet, Take 2 tablets by mouth Every Evening., Disp: 90 tablet, Rfl: 3  •  Azelastine HCl 137 MCG/SPRAY solution, Instill 1-2 sprays in each nostril twice a day as needed, Disp: 30 mL, Rfl: 11  •  azithromycin (Zithromax) 250 MG tablet, Take 4 tablets by mouth on day of dental surgery., Disp: 12 tablet, Rfl: 0  •  buPROPion SR (WELLBUTRIN SR) 150 MG 12 hr tablet, Take 1 tablet by mouth 2 (two) times a day., Disp: 180 tablet, Rfl: 3  •  calcium carbonate (Antacid Maximum) 1000 MG chewable tablet, Chew 500 mg 3 (Three) Times a Day., Disp: , Rfl:   •  CBD (cannabidiol) oral oil, Take  by mouth 2 (Two) Times a Day., Disp: , Rfl:   •  chlorhexidine (PERIDEX) 0.12 % solution, Swish 15 mL around mouth for 30 seconds,  then spit out. Use twice daily as directed. , Disp: 473 mL, Rfl: 0  •  cholecalciferol (VITAMIN D3) 25 MCG (1000 UT) tablet, Take 1,000 Units by mouth Daily., Disp: , Rfl:   •  clonazePAM (KlonoPIN) 0.5 MG tablet, Take 1 tablet by mouth At Night As Needed, Disp: 30 tablet, Rfl: 1  •  Dextromethorphan-guaiFENesin (Mucus-DM)  MG tablet sustained-release 12 hour, Take  by mouth., Disp: , Rfl:   •  dicyclomine (BENTYL) 10 MG capsule, Take 1 capsule by mouth 3 (Three) Times a Day., Disp: 270 capsule, Rfl: 3  •  diphenhydrAMINE (Benadryl Allergy) 25 MG tablet, Take 1-2 tablets by mouth Every 4-6 Hours As Needed., Disp: 90 tablet, Rfl: 11  •  EPINEPHrine (EPIPEN) 0.3 MG/0.3ML solution auto-injector injection, Use as directed for acute allergic reaction, Disp: 2 each, Rfl: 3  •  famotidine (PEPCID) 20 MG tablet, Take 20 mg by mouth 2 (Two) Times a Day., Disp: , Rfl:   •  ferrous sulfate 325 (65 Fe) MG tablet, Take  by mouth., Disp: , Rfl:   •  fexofenadine (ALLEGRA) 180 MG tablet, Take 180 mg by mouth Daily., Disp: , Rfl:   •  fluticasone (FLONASE) 50 MCG/ACT nasal spray, Instill 2 sprays into each nostril daily., Disp: 16 g, Rfl: 11  •  fluticasone (FLOVENT DISKUS) 50 MCG/BLIST diskus inhaler, Inhale 1 puff 2 (Two) Times a Day., Disp: , Rfl:   •  fluticasone-salmeterol (Advair HFA) 230-21 MCG/ACT inhaler, Inhale 2 puffs by mouth 2 (Two) Times a Day., Disp: 12 g, Rfl: 11  •  fluticasone-salmeterol (ADVAIR) 500-50 MCG/DOSE DISKUS, Inhale 1 puff., Disp: , Rfl:   •  folic acid (FOLVITE) 1 MG tablet, Take 1 tablet by mouth Daily., Disp: 90 tablet, Rfl: 3  •  furosemide (LASIX) 40 MG tablet, Take 1 tablet by mouth Daily., Disp: 90 tablet, Rfl: 3  •  glucose-vitamin C ([employer] Glucose) 4-6 GM-MG chewable tablet chewable tablet, Chew., Disp: , Rfl:   •  guaiFENesin-codeine (Virtussin A/C) 100-10 MG/5ML liquid, Take 5 mL by mouth 3 (Three) Times a Day As Needed for Cough., Disp: , Rfl:   •  ipratropium (ATROVENT) 0.06 % nasal  spray, Spray 2 sprays into the nostril(s) every night at bedtime as directed by provider, Disp: 15 mL, Rfl: 12  •  ipratropium-albuterol (DUO-NEB) 0.5-2.5 mg/3 ml nebulizer, Inhale contents of 1 vial (3mL) via nebulizer every 4-6 hours as needed for coughing, wheezing, or shortness of breath., Disp: 300 mL, Rfl: 3  •  isosorbide dinitrate (ISORDIL) 10 MG tablet, Take 1 tablet by mouth 3 (Three) Times a Day., Disp: 270 tablet, Rfl: 3  •  levothyroxine (SYNTHROID, LEVOTHROID) 50 MCG tablet, Take 50 mcg by mouth Daily., Disp: , Rfl:   •  Magnesium 250 MG tablet, Take 500 mg by mouth Daily. Two tablets, Disp: , Rfl:   •  melatonin 5 MG tablet tablet, Take 5 mg by mouth Every Night., Disp: , Rfl:   •  methotrexate 2.5 MG tablet, Take 2 tablets by mouth 1 (One) Time Per Week., Disp: 8 tablet, Rfl: 5  •  montelukast (SINGULAIR) 10 MG tablet, Take 10 mg by mouth Every Night., Disp: , Rfl:   •  multivitamin with minerals (ONE-A-DAY PROACTIVE 65+ PO), Take 1 tablet by mouth Daily., Disp: , Rfl:   •  omeprazole (priLOSEC) 40 MG capsule, Take 1 capsule by mouth 2 (two) times a day., Disp: 90 capsule, Rfl: 3  •  ondansetron (ZOFRAN) 4 MG tablet, Take 1 tablet by mouth Every 8 (Eight) Hours As Needed for Nausea or Vomiting., Disp: 30 tablet, Rfl: 11  •  potassium citrate (UROCIT-K) 10 MEQ (1080 MG) CR tablet, Take 1 tablet by mouth Daily., Disp: 90 tablet, Rfl: 3  •  predniSONE (DELTASONE) 5 MG tablet, Take 1 tablet by mouth Daily., Disp: 30 tablet, Rfl: 1  •  promethazine-dextromethorphan (PROMETHAZINE-DM) 6.25-15 MG/5ML syrup, Take 5 mL by mouth 4 (Four) Times a Day As Needed for Cough., Disp: 240 mL, Rfl: 0  •  Spacer/Aero-Holding Chambers (AeroChamber Plus Wali-Vu) misc, Use as directed with albuterol inhaler, Disp: 1 each, Rfl: 0  •  tiotropium bromide monohydrate (SPIRIVA RESPIMAT) 2.5 MCG/ACT aerosol solution inhaler, Inhale 2 puffs by mouth daily, Disp: 4 g, Rfl: 11  •  tiZANidine (ZANAFLEX) 4 MG tablet, Take 1 tablet by  "mouth Every Night., Disp: 90 tablet, Rfl: 1  •  ubrogepant (ubrogepant) 100 MG tablet, Take 1 tablet by mouth once per day as needed at onset of migraine, Disp: 10 tablet, Rfl: 11  •  valsartan (DIOVAN) 160 MG tablet, Take 1 tablet by mouth Daily., Disp: 90 tablet, Rfl: 3  •  vitamin B-12 (CYANOCOBALAMIN) 1000 MCG tablet, Take 1,000 mcg by mouth 2 (two) times a day., Disp: , Rfl:   •  Semaglutide (Rybelsus) 3 MG tablet, Take 6 mg by mouth Daily., Disp: 60 tablet, Rfl: 11  •  Semaglutide (Rybelsus) 7 MG tablet, Take 7 mg by mouth Daily., Disp: 90 tablet, Rfl: 3    Current Facility-Administered Medications:   •  heparin injection 500 Units, 5 mL, Intravenous, PRN, Sanjeev Rawls MD  •  sodium chloride 0.9 % flush 10 mL, 10 mL, Intravenous, Q12H, Sanjeev Rawls MD  •  sodium chloride 0.9 % flush 10 mL, 10 mL, Intravenous, PRN, Sanjeev Rawls MD  •  sodium chloride 0.9 % flush 20 mL, 20 mL, Intravenous, PRN, Sanjeev Rawls MD    Objective      Blood pressure 128/78, pulse 91, temperature 98.2 °F (36.8 °C), resp. rate 16, height 165.1 cm (65\"), weight 115 kg (253 lb), SpO2 96 %, not currently breastfeeding.    Physical Exam     General Appearance:    Alert, cooperative, no distress, appears stated age   Head:    Normocephalic, without obvious abnormality, atraumatic   Eyes:    PERRL, conjunctiva/corneas clear, EOM's intact   Ears:    Normal TM's and external ear canals, both ears   Nose:   Nares normal, septum midline, mucosa normal, no drainage   or sinus tenderness   Throat:   Lips, mucosa, and tongue normal; teeth and gums normal   Neck:   Supple, symmetrical, trachea midline, no adenopathy;        thyroid:  No enlargement/tenderness/nodules; no carotid    bruit or JVD   Back:     Symmetric, no curvature, ROM normal, no CVA tenderness   Lungs:     Clear to auscultation bilaterally, respirations unlabored   Chest wall:    No tenderness or deformity   Heart:    Regular rate and rhythm, S1 and S2 normal, no " murmur,        rub or gallop   Abdomen:     Soft, non-tender, bowel sounds active all four quadrants,     no masses, no organomegaly   Extremities:   Extremities normal, atraumatic, no cyanosis or edema   Pulses:   2+ and symmetric all extremities   Skin:   Skin color, texture, turgor normal, no rashes or lesions   Lymph nodes:   Cervical, supraclavicular, and axillary nodes normal   Neurologic:   CNII-XII intact. Normal strength, sensation and reflexes       throughout      Results for orders placed or performed in visit on 05/14/21   IgG    Specimen: Blood   Result Value Ref Range    IgG 907 700-1,600 mg/dL   CBC Auto Differential    Specimen: Blood   Result Value Ref Range    WBC 4.71 3.40 - 10.80 10*3/mm3    RBC 3.84 3.77 - 5.28 10*6/mm3    Hemoglobin 12.4 12.0 - 15.9 g/dL    Hematocrit 36.9 34.0 - 46.6 %    MCV 96.1 79.0 - 97.0 fL    MCH 32.3 26.6 - 33.0 pg    MCHC 33.6 31.5 - 35.7 g/dL    RDW 13.8 12.3 - 15.4 %    RDW-SD 49.1 37.0 - 54.0 fl    MPV 8.5 6.0 - 12.0 fL    Platelets 261 140 - 450 10*3/mm3    Neutrophil % 50.1 42.7 - 76.0 %    Lymphocyte % 32.3 19.6 - 45.3 %    Monocyte % 11.7 5.0 - 12.0 %    Eosinophil % 4.0 0.3 - 6.2 %    Basophil % 1.5 0.0 - 1.5 %    Immature Grans % 0.4 0.0 - 0.5 %    Neutrophils, Absolute 2.36 1.70 - 7.00 10*3/mm3    Lymphocytes, Absolute 1.52 0.70 - 3.10 10*3/mm3    Monocytes, Absolute 0.55 0.10 - 0.90 10*3/mm3    Eosinophils, Absolute 0.19 0.00 - 0.40 10*3/mm3    Basophils, Absolute 0.07 0.00 - 0.20 10*3/mm3    Immature Grans, Absolute 0.02 0.00 - 0.05 10*3/mm3    nRBC 0.0 0.0 - 0.2 /100 WBC   Comprehensive Metabolic Panel    Specimen: Blood   Result Value Ref Range    Glucose 135 (H) 65 - 99 mg/dL    BUN 13 8 - 23 mg/dL    Creatinine 1.30 (H) 0.57 - 1.00 mg/dL    Sodium 131 (L) 136 - 145 mmol/L    Potassium 4.5 3.5 - 5.2 mmol/L    Chloride 96 (L) 98 - 107 mmol/L    CO2 26.5 22.0 - 29.0 mmol/L    Calcium 9.2 8.6 - 10.5 mg/dL    Total Protein 6.5 6.0 - 8.5 g/dL    Albumin 4.30  3.50 - 5.20 g/dL    ALT (SGPT) 15 1 - 33 U/L    AST (SGOT) 24 1 - 32 U/L    Alkaline Phosphatase 68 39 - 117 U/L    Total Bilirubin 0.3 0.0 - 1.2 mg/dL    eGFR Non African Amer 41 (L) >60 mL/min/1.73    Globulin 2.2 gm/dL    A/G Ratio 2.0 g/dL    BUN/Creatinine Ratio 10.0 7.0 - 25.0    Anion Gap 8.5 5.0 - 15.0 mmol/L   CK    Specimen: Blood   Result Value Ref Range    Creatine Kinase 199 (H) 20 - 180 U/L         Assessment/Plan       Diagnoses and all orders for this visit:    1. Gastroesophageal reflux disease without esophagitis (Primary)  -     Hemoglobin A1c  -     Comprehensive Metabolic Panel  -     CBC & Differential  -     Uric Acid    2. Morbid obesity with BMI of 40.0-44.9, adult (CMS/[facility])  -     Hemoglobin A1c  -     Comprehensive Metabolic Panel  -     CBC & Differential  -     Uric Acid    3. Type 2 diabetes mellitus without complication, without long-term current use of insulin (CMS/[facility])  -     Semaglutide (Rybelsus) 7 MG tablet; Take 7 mg by mouth Daily.  Dispense: 90 tablet; Refill: 3  -     Semaglutide (Rybelsus) 3 MG tablet; Take 6 mg by mouth Daily.  Dispense: 60 tablet; Refill: 11  -     Hemoglobin A1c  -     Comprehensive Metabolic Panel  -     CBC & Differential  -     Uric Acid    4. Hyponatremia  -     Hemoglobin A1c  -     Comprehensive Metabolic Panel  -     CBC & Differential  -     Uric Acid    5. Routine general medical examination at a health care facility    Other orders  -     albuterol sulfate  (90 Base) MCG/ACT inhaler; Inhale 2 puffs by mouth every 4-6 hours as needed for cough, wheeze, shortness of breath. Use with vortex spacer  Dispense: 18 g; Refill: 3      Return in about 6 weeks (around 7/19/2021).          There are no Patient Instructions on file for this visit.     Sanjeev Rawls MD    Assessment/Plan

## 2021-06-14 ENCOUNTER — HOSPITAL ENCOUNTER (OUTPATIENT)
Dept: INFUSION THERAPY | Facility: HOSPITAL | Age: 66
Setting detail: INFUSION SERIES
Discharge: HOME | End: 2021-06-14

## 2021-06-14 VITALS
OXYGEN SATURATION: 96 % | TEMPERATURE: 98 F | SYSTOLIC BLOOD PRESSURE: 145 MMHG | DIASTOLIC BLOOD PRESSURE: 86 MMHG | WEIGHT: 250 LBS | HEART RATE: 88 BPM | BODY MASS INDEX: 41.6 KG/M2

## 2021-06-14 DIAGNOSIS — Z95.828 PORT-A-CATH IN PLACE: Primary | ICD-10-CM

## 2021-06-14 DIAGNOSIS — D80.1 COMMON VARIABLE AGAMMAGLOBULINEMIA (HCC): ICD-10-CM

## 2021-06-14 PROCEDURE — 96366 THER/PROPH/DIAG IV INF ADDON: CPT

## 2021-06-14 PROCEDURE — 25010000002 IMMUNE GLOBULIN (HUMAN) 20 GM/200ML SOLUTION: Performed by: ALLERGY & IMMUNOLOGY

## 2021-06-14 PROCEDURE — 25010000002 HEPARIN LOCK FLUSH PER 10 UNITS: Performed by: INTERNAL MEDICINE

## 2021-06-14 PROCEDURE — 96365 THER/PROPH/DIAG IV INF INIT: CPT

## 2021-06-14 RX ORDER — PREDNISONE 20 MG/1
40 TABLET ORAL ONCE AS NEEDED
Status: DISCONTINUED | OUTPATIENT
Start: 2021-06-14 | End: 2021-06-16 | Stop reason: HOSPADM

## 2021-06-14 RX ORDER — DIPHENHYDRAMINE HCL 25 MG
50 CAPSULE ORAL ONCE
Status: DISCONTINUED | OUTPATIENT
Start: 2021-06-14 | End: 2021-06-16 | Stop reason: HOSPADM

## 2021-06-14 RX ORDER — EPINEPHRINE 1 MG/ML
0.3 INJECTION, SOLUTION INTRAMUSCULAR; SUBCUTANEOUS ONCE AS NEEDED
Status: DISCONTINUED | OUTPATIENT
Start: 2021-06-14 | End: 2021-06-16 | Stop reason: HOSPADM

## 2021-06-14 RX ORDER — ACETAMINOPHEN 325 MG/1
325 TABLET ORAL ONCE
Status: CANCELLED | OUTPATIENT
Start: 2021-07-05

## 2021-06-14 RX ORDER — SODIUM CHLORIDE 0.9 % (FLUSH) 0.9 %
10 SYRINGE (ML) INJECTION AS NEEDED
Status: DISCONTINUED | OUTPATIENT
Start: 2021-06-14 | End: 2021-06-16 | Stop reason: HOSPADM

## 2021-06-14 RX ORDER — DIPHENHYDRAMINE HCL 25 MG
50 CAPSULE ORAL ONCE
Status: CANCELLED
Start: 2021-07-05 | End: 2021-07-05

## 2021-06-14 RX ORDER — PREDNISONE 20 MG/1
40 TABLET ORAL ONCE
Status: DISCONTINUED | OUTPATIENT
Start: 2021-06-14 | End: 2021-06-16 | Stop reason: HOSPADM

## 2021-06-14 RX ORDER — DIPHENHYDRAMINE HCL 25 MG
50 CAPSULE ORAL ONCE AS NEEDED
Status: DISCONTINUED | OUTPATIENT
Start: 2021-06-14 | End: 2021-06-16 | Stop reason: HOSPADM

## 2021-06-14 RX ORDER — EPINEPHRINE 1 MG/ML
0.3 INJECTION, SOLUTION INTRAMUSCULAR; SUBCUTANEOUS ONCE AS NEEDED
Status: CANCELLED
Start: 2021-07-05

## 2021-06-14 RX ORDER — DIPHENHYDRAMINE HCL 25 MG
50 CAPSULE ORAL ONCE AS NEEDED
Status: CANCELLED
Start: 2021-07-05

## 2021-06-14 RX ORDER — SODIUM CHLORIDE 0.9 % (FLUSH) 0.9 %
10 SYRINGE (ML) INJECTION AS NEEDED
Status: CANCELLED | OUTPATIENT
Start: 2021-06-14

## 2021-06-14 RX ORDER — METHYLPREDNISOLONE SODIUM SUCCINATE 125 MG/2ML
50 INJECTION, POWDER, LYOPHILIZED, FOR SOLUTION INTRAMUSCULAR; INTRAVENOUS ONCE AS NEEDED
Status: CANCELLED
Start: 2021-07-05

## 2021-06-14 RX ORDER — HEPARIN SODIUM (PORCINE) LOCK FLUSH IV SOLN 100 UNIT/ML 100 UNIT/ML
500 SOLUTION INTRAVENOUS AS NEEDED
Status: CANCELLED | OUTPATIENT
Start: 2021-06-14

## 2021-06-14 RX ORDER — PREDNISONE 20 MG/1
40 TABLET ORAL ONCE AS NEEDED
Status: CANCELLED
Start: 2021-07-05

## 2021-06-14 RX ORDER — METHYLPREDNISOLONE SODIUM SUCCINATE 125 MG/2ML
50 INJECTION, POWDER, LYOPHILIZED, FOR SOLUTION INTRAMUSCULAR; INTRAVENOUS ONCE AS NEEDED
Status: DISCONTINUED | OUTPATIENT
Start: 2021-06-14 | End: 2021-06-16 | Stop reason: HOSPADM

## 2021-06-14 RX ORDER — HEPARIN SODIUM (PORCINE) LOCK FLUSH IV SOLN 100 UNIT/ML 100 UNIT/ML
500 SOLUTION INTRAVENOUS AS NEEDED
Status: DISCONTINUED | OUTPATIENT
Start: 2021-06-14 | End: 2021-06-16 | Stop reason: HOSPADM

## 2021-06-14 RX ORDER — ACETAMINOPHEN 325 MG/1
325 TABLET ORAL ONCE
Status: DISCONTINUED | OUTPATIENT
Start: 2021-06-14 | End: 2021-06-16 | Stop reason: HOSPADM

## 2021-06-14 RX ORDER — PREDNISONE 20 MG/1
40 TABLET ORAL ONCE
Status: CANCELLED
Start: 2021-07-05 | End: 2021-07-05

## 2021-06-14 RX ADMIN — IMMUNE GLOBULIN INFUSION (HUMAN) 20 G: 100 INJECTION, SOLUTION INTRAVENOUS; SUBCUTANEOUS at 11:25

## 2021-06-14 RX ADMIN — SODIUM CHLORIDE, PRESERVATIVE FREE 10 ML: 5 INJECTION INTRAVENOUS at 12:34

## 2021-06-14 RX ADMIN — IMMUNE GLOBULIN INFUSION (HUMAN) 20 G: 100 INJECTION, SOLUTION INTRAVENOUS; SUBCUTANEOUS at 09:25

## 2021-06-14 RX ADMIN — HEPARIN 500 UNITS: 100 SYRINGE at 12:34

## 2021-06-15 ENCOUNTER — TELEPHONE (OUTPATIENT)
Dept: CARDIOLOGY | Facility: CLINIC | Age: 66
End: 2021-06-15

## 2021-06-15 NOTE — TELEPHONE ENCOUNTER
Records from [facility] (visit date: 3/2/2021) shows the following impression: Abdominal aorta was evaluated with a duplex today.  Her aorta and iliac vessels are all within normal diameter with multiphasic waveform.  She does not have an abdominal aortic aneurysm.  She does have a moderate aortic ejection murmur with an abnormal echocardiogram demonstrating both aortic regurgitation as well as mild aortic stenosis.  She is currently asymptomatic.  I recommend for her to follow-up with her cardiologist at a minimum of yearly basis for this I will see her back as needed.  She is diabetic but currently not having any lower extremity vascular symptoms.    SCANNED TO CHART

## 2021-06-16 RX ORDER — LEVOTHYROXINE SODIUM 0.05 MG/1
50 TABLET ORAL DAILY
Qty: 90 TABLET | Refills: 3 | Status: SHIPPED | OUTPATIENT
Start: 2021-06-16 | End: 2022-06-06

## 2021-06-21 ENCOUNTER — TELEPHONE (OUTPATIENT)
Dept: INTERNAL MEDICINE | Facility: CLINIC | Age: 66
End: 2021-06-21

## 2021-06-21 NOTE — TELEPHONE ENCOUNTER
Caller: Kodi Raquel    Relationship: Self    Best call back number: [phone] (M)    What medications are you currently taking:   Current Outpatient Medications on File Prior to Visit   Medication Sig Dispense Refill   • acetaminophen (TYLENOL) 325 MG tablet Take 1-2 tablets by mouth prior to [facility] 10 tablet 11   • albuterol sulfate  (90 Base) MCG/ACT inhaler Inhale 2 puffs by mouth every 4-6 hours as needed for cough, wheeze, shortness of breath. Use with vortex spacer 8.5 g 3   • allopurinol (Zyloprim) 100 MG tablet Take 1 tablet by mouth Daily. 90 tablet 3   • amitriptyline (ELAVIL) 10 MG tablet Take 2 tablets by mouth Every Evening. 90 tablet 3   • Azelastine HCl 137 MCG/SPRAY solution Instill 1-2 sprays in each nostril twice a day as needed 30 mL 11   • azithromycin (Zithromax) 250 MG tablet Take 4 tablets by mouth on day of dental surgery. 12 tablet 0   • betamethasone valerate (VALISONE) 0.1 % cream Apply topically to the appropriate area as directed Daily. 45 g 11   • buPROPion SR (WELLBUTRIN SR) 150 MG 12 hr tablet Take 1 tablet by mouth 2 (two) times a day. 180 tablet 3   • calcium carbonate (Antacid Maximum) 1000 MG chewable tablet Chew 500 mg 3 (Three) Times a Day.     • CBD (cannabidiol) oral oil Take  by mouth 2 (Two) Times a Day.     • chlorhexidine (PERIDEX) 0.12 % solution Swish 15 mL around mouth for 30 seconds, then spit out. Use twice daily as directed.  473 mL 0   • cholecalciferol (VITAMIN D3) 25 MCG (1000 UT) tablet Take 1,000 Units by mouth Daily.     • clonazePAM (KlonoPIN) 0.5 MG tablet Take 1 tablet by mouth At Night As Needed 30 tablet 1   • Dextromethorphan-guaiFENesin (Mucus-DM)  MG tablet sustained-release 12 hour Take  by mouth.     • dicyclomine (BENTYL) 10 MG capsule Take 1 capsule by mouth 3 (Three) Times a Day. 270 capsule 3   • diphenhydrAMINE (Benadryl Allergy) 25 MG tablet Take 1-2 tablets by mouth Every 4-6 Hours As Needed. 90 tablet 11   •  EPINEPHrine (EPIPEN) 0.3 MG/0.3ML solution auto-injector injection Use as directed for acute allergic reaction 2 each 3   • famotidine (PEPCID) 20 MG tablet Take 20 mg by mouth 2 (Two) Times a Day.     • ferrous sulfate 325 (65 Fe) MG tablet Take  by mouth.     • fexofenadine (ALLEGRA) 180 MG tablet Take 180 mg by mouth Daily.     • fluticasone (FLONASE) 50 MCG/ACT nasal spray Instill 2 sprays into each nostril daily. 16 g 11   • fluticasone (FLOVENT DISKUS) 50 MCG/BLIST diskus inhaler Inhale 1 puff 2 (Two) Times a Day.     • fluticasone-salmeterol (Advair HFA) 230-21 MCG/ACT inhaler Inhale 2 puffs by mouth 2 (Two) Times a Day. 12 g 11   • fluticasone-salmeterol (ADVAIR) 500-50 MCG/DOSE DISKUS Inhale 1 puff.     • folic acid (FOLVITE) 1 MG tablet Take 1 tablet by mouth Daily. 90 tablet 3   • furosemide (LASIX) 40 MG tablet Take 1 tablet by mouth Daily. 90 tablet 3   • glucose-vitamin C ([employer] Glucose) 4-6 GM-MG chewable tablet chewable tablet Chew.     • guaiFENesin-codeine (Virtussin A/C) 100-10 MG/5ML liquid Take 5 mL by mouth 3 (Three) Times a Day As Needed for Cough.     • ipratropium (ATROVENT) 0.06 % nasal spray Spray 2 sprays into the nostril(s) every night at bedtime as directed by provider 15 mL 12   • ipratropium-albuterol (DUO-NEB) 0.5-2.5 mg/3 ml nebulizer Inhale contents of 1 vial (3mL) via nebulizer every 4-6 hours as needed for coughing, wheezing, or shortness of breath. 300 mL 3   • isosorbide dinitrate (ISORDIL) 10 MG tablet Take 1 tablet by mouth 3 (Three) Times a Day. 270 tablet 3   • levothyroxine (SYNTHROID, LEVOTHROID) 50 MCG tablet Take 1 tablet by mouth Daily. 90 tablet 3   • Magnesium 250 MG tablet Take 500 mg by mouth Daily. Two tablets     • melatonin 5 MG tablet tablet Take 5 mg by mouth Every Night.     • methotrexate 2.5 MG tablet Take 2 tablets by mouth 1 (One) Time Per Week. 8 tablet 5   • montelukast (SINGULAIR) 10 MG tablet Take 10 mg by mouth Every Night.     • multivitamin with  minerals (ONE-A-DAY PROACTIVE 65+ PO) Take 1 tablet by mouth Daily.     • omeprazole (priLOSEC) 40 MG capsule Take 1 capsule by mouth 2 (two) times a day. 90 capsule 3   • ondansetron (ZOFRAN) 4 MG tablet Take 1 tablet by mouth Every 8 (Eight) Hours As Needed for Nausea or Vomiting. 30 tablet 11   • potassium citrate (UROCIT-K) 10 MEQ (1080 MG) CR tablet Take 1 tablet by mouth Daily. 90 tablet 3   • predniSONE (DELTASONE) 5 MG tablet Take 1 tablet by mouth Daily. 30 tablet 1   • promethazine-dextromethorphan (PROMETHAZINE-DM) 6.25-15 MG/5ML syrup Take 5 mL by mouth 4 (Four) Times a Day As Needed for Cough. 240 mL 0   • Semaglutide (Rybelsus) 3 MG tablet Take 6 mg by mouth Daily. 60 tablet 11   • Semaglutide (Rybelsus) 7 MG tablet Take 1 tablet by mouth Daily. 90 tablet 3   • Spacer/Aero-Holding Chambers (AeroChamber Plus Wali-Vu) misc Use as directed with albuterol inhaler 1 each 0   • tiotropium bromide monohydrate (SPIRIVA RESPIMAT) 2.5 MCG/ACT aerosol solution inhaler Inhale 2 puffs by mouth daily 4 g 11   • tiZANidine (ZANAFLEX) 4 MG tablet Take 1 tablet by mouth Every Night. 90 tablet 1   • ubrogepant (ubrogepant) 100 MG tablet Take 1 tablet by mouth once per day as needed at onset of migraine 10 tablet 11   • valsartan (DIOVAN) 160 MG tablet Take 1 tablet by mouth Daily. 90 tablet 3   • vitamin B-12 (CYANOCOBALAMIN) 1000 MCG tablet Take 1,000 mcg by mouth 2 (two) times a day.       Current Facility-Administered Medications on File Prior to Visit   Medication Dose Route Frequency Provider Last Rate Last Admin   • heparin injection 500 Units  5 mL Intravenous PRN Sanjeev Rawls MD       • sodium chloride 0.9 % flush 10 mL  10 mL Intravenous Q12H Sanjeev Rawls MD       • sodium chloride 0.9 % flush 10 mL  10 mL Intravenous PRN Sanjeev Rawls MD       • sodium chloride 0.9 % flush 20 mL  20 mL Intravenous PRN Sanjeev Rawls MD            When did you start taking these medications:   6/7/21    Which  medication are you concerned about:   Semaglutide (Rybelsus) 7 MG tablet  7 mg, Daily 3 of 3 remaining         Summary: Take 1 tablet by mouth Daily., Starting Mon 6/7/2021, Normal   Dose, Route, Frequency: 7 mg, Oral, Daily        Who prescribed you this medication:   DEBI STANLEY MD    What are your concerns:   PATIENT STATES THAT SHE GETS SO NAUSEOUS ON THIS MEDICATION AND SHE CAN NOT CONTINUE TO TAKE THE RYBELSUS ANY LONGER. PATIENT HAS VOLUNTARILY DISCONTINUED USE OF THE MEDICATION AS OF 6/20/21.    PATIENT STATES THAT SHE ALSO HAVE REAL BAD HEARTBURN AS WELL AND WILL LIKE TO ADDRESS THIS MEDICAL ISSUE AS WELL.    How long have you been taking these medications: 2 WEEKS    How long have you had these concerns: SINCE PATIENT STARTED THE MEDICATION.      PATIENT HAS BEEN ADVISED TO ALLOW 48 HOURS FOR THE CLINICAL TEAM TO FOLLOW UP ON THIS REQUEST,  IF SYMPTOMS WORSENS TO SEEK OUT EMERGENT CARE. PATIENT  FULLY UNDERSTANDS.

## 2021-06-22 DIAGNOSIS — N18.9 ACUTE RENAL FAILURE SUPERIMPOSED ON CHRONIC KIDNEY DISEASE, UNSPECIFIED CKD STAGE, UNSPECIFIED ACUTE RENAL FAILURE TYPE (HCC): Primary | ICD-10-CM

## 2021-06-22 DIAGNOSIS — N17.9 ACUTE RENAL FAILURE SUPERIMPOSED ON CHRONIC KIDNEY DISEASE, UNSPECIFIED CKD STAGE, UNSPECIFIED ACUTE RENAL FAILURE TYPE (HCC): Primary | ICD-10-CM

## 2021-06-22 LAB
ALBUMIN SERPL-MCNC: 4.7 G/DL (ref 3.5–5.2)
ALBUMIN/GLOB SERPL: 2 G/DL
ALP SERPL-CCNC: 68 U/L (ref 39–117)
ALT SERPL-CCNC: 20 U/L (ref 1–33)
AST SERPL-CCNC: 32 U/L (ref 1–32)
BASOPHILS # BLD AUTO: 0.05 10*3/MM3 (ref 0–0.2)
BASOPHILS NFR BLD AUTO: 1.1 % (ref 0–1.5)
BILIRUB SERPL-MCNC: 0.3 MG/DL (ref 0–1.2)
BUN SERPL-MCNC: 14 MG/DL (ref 8–23)
BUN/CREAT SERPL: 8.7 (ref 7–25)
CALCIUM SERPL-MCNC: 9.4 MG/DL (ref 8.6–10.5)
CHLORIDE SERPL-SCNC: 98 MMOL/L (ref 98–107)
CK SERPL-CCNC: 161 U/L (ref 20–180)
CO2 SERPL-SCNC: 26.1 MMOL/L (ref 22–29)
CREAT SERPL-MCNC: 1.61 MG/DL (ref 0.57–1)
EOSINOPHIL # BLD AUTO: 0.22 10*3/MM3 (ref 0–0.4)
EOSINOPHIL NFR BLD AUTO: 5 % (ref 0.3–6.2)
ERYTHROCYTE [DISTWIDTH] IN BLOOD BY AUTOMATED COUNT: 13.7 % (ref 12.3–15.4)
GLOBULIN SER CALC-MCNC: 2.4 GM/DL
GLUCOSE SERPL-MCNC: 126 MG/DL (ref 65–99)
HBA1C MFR BLD: 6.7 % (ref 4.8–5.6)
HCT VFR BLD AUTO: 37.2 % (ref 34–46.6)
HGB BLD-MCNC: 12.7 G/DL (ref 12–15.9)
IMM GRANULOCYTES # BLD AUTO: 0.02 10*3/MM3 (ref 0–0.05)
IMM GRANULOCYTES NFR BLD AUTO: 0.5 % (ref 0–0.5)
LYMPHOCYTES # BLD AUTO: 1.21 10*3/MM3 (ref 0.7–3.1)
LYMPHOCYTES NFR BLD AUTO: 27.4 % (ref 19.6–45.3)
MCH RBC QN AUTO: 32.5 PG (ref 26.6–33)
MCHC RBC AUTO-ENTMCNC: 34.1 G/DL (ref 31.5–35.7)
MCV RBC AUTO: 95.1 FL (ref 79–97)
MONOCYTES # BLD AUTO: 0.44 10*3/MM3 (ref 0.1–0.9)
MONOCYTES NFR BLD AUTO: 10 % (ref 5–12)
MYOGLOBIN SERPL-MCNC: 88.6 NG/ML (ref 25–58)
NEUTROPHILS # BLD AUTO: 2.47 10*3/MM3 (ref 1.7–7)
NEUTROPHILS NFR BLD AUTO: 56 % (ref 42.7–76)
NRBC BLD AUTO-RTO: 0 /100 WBC (ref 0–0.2)
PLATELET # BLD AUTO: 269 10*3/MM3 (ref 140–450)
POTASSIUM SERPL-SCNC: 4.7 MMOL/L (ref 3.5–5.2)
PROT SERPL-MCNC: 7.1 G/DL (ref 6–8.5)
RBC # BLD AUTO: 3.91 10*6/MM3 (ref 3.77–5.28)
SODIUM SERPL-SCNC: 135 MMOL/L (ref 136–145)
URATE SERPL-MCNC: 4.1 MG/DL (ref 2.4–5.7)
WBC # BLD AUTO: 4.41 10*3/MM3 (ref 3.4–10.8)

## 2021-06-22 NOTE — PROGRESS NOTES
Please let them know the muslce enzymes still mild elevation.  Renal function a bit worse.  Ha1c stable.    Need to repeat BMP prior to rtc with good hydration. Order in

## 2021-06-30 ENCOUNTER — OFFICE VISIT (OUTPATIENT)
Dept: INTERNAL MEDICINE | Facility: CLINIC | Age: 66
End: 2021-06-30

## 2021-06-30 VITALS
RESPIRATION RATE: 16 BRPM | DIASTOLIC BLOOD PRESSURE: 84 MMHG | HEART RATE: 86 BPM | HEIGHT: 65 IN | SYSTOLIC BLOOD PRESSURE: 118 MMHG | WEIGHT: 252 LBS | TEMPERATURE: 97.8 F | BODY MASS INDEX: 41.99 KG/M2 | OXYGEN SATURATION: 95 %

## 2021-06-30 DIAGNOSIS — N17.9 ACUTE RENAL FAILURE SUPERIMPOSED ON CHRONIC KIDNEY DISEASE, UNSPECIFIED CKD STAGE, UNSPECIFIED ACUTE RENAL FAILURE TYPE (HCC): ICD-10-CM

## 2021-06-30 DIAGNOSIS — N18.9 ACUTE RENAL FAILURE SUPERIMPOSED ON CHRONIC KIDNEY DISEASE, UNSPECIFIED CKD STAGE, UNSPECIFIED ACUTE RENAL FAILURE TYPE (HCC): ICD-10-CM

## 2021-06-30 DIAGNOSIS — R11.0 NAUSEA: Primary | ICD-10-CM

## 2021-06-30 PROCEDURE — 99214 OFFICE O/P EST MOD 30 MIN: CPT | Performed by: INTERNAL MEDICINE

## 2021-06-30 RX ORDER — SUCRALFATE 1 G/1
1 TABLET ORAL 4 TIMES DAILY
Qty: 120 TABLET | Refills: 1 | Status: SHIPPED | OUTPATIENT
Start: 2021-06-30 | End: 2021-08-24 | Stop reason: SDUPTHER

## 2021-06-30 NOTE — PROGRESS NOTES
Subjective     Patient ID: Raquel Mariee is a 65 y.o. female. Patient is here for management of multiple medical problems.     Chief Complaint   Patient presents with   • Nausea   • Heartburn     History of Present Illness   Nausea and heart burn. Getting g worse. Had bee o nrybelus.  Not better off med.  Wt down. Some.      Hx of gastritis and duodenitis at age 30.      The following portions of the patient's history were reviewed and updated as appropriate: allergies, current medications, past family history, past medical history, past social history, past surgical history and problem list.    Review of Systems   Constitutional: Negative for fatigue.   Respiratory: Negative for shortness of breath.    Psychiatric/Behavioral: Negative for self-injury and sleep disturbance. The patient is not nervous/anxious.    All other systems reviewed and are negative.      Current Outpatient Medications:   •  albuterol sulfate  (90 Base) MCG/ACT inhaler, Inhale 2 puffs by mouth every 4-6 hours as needed for cough, wheeze, shortness of breath. Use with vortex spacer, Disp: 8.5 g, Rfl: 3  •  allopurinol (Zyloprim) 100 MG tablet, Take 1 tablet by mouth Daily., Disp: 90 tablet, Rfl: 3  •  amitriptyline (ELAVIL) 10 MG tablet, Take 2 tablets by mouth Every Evening., Disp: 90 tablet, Rfl: 3  •  Azelastine HCl 137 MCG/SPRAY solution, Instill 1-2 sprays in each nostril twice a day as needed, Disp: 30 mL, Rfl: 11  •  azithromycin (Zithromax) 250 MG tablet, Take 4 tablets by mouth on day of dental surgery., Disp: 12 tablet, Rfl: 0  •  betamethasone valerate (VALISONE) 0.1 % cream, Apply topically to the appropriate area as directed Daily., Disp: 45 g, Rfl: 11  •  buPROPion SR (WELLBUTRIN SR) 150 MG 12 hr tablet, Take 1 tablet by mouth 2 (two) times a day., Disp: 180 tablet, Rfl: 3  •  calcium carbonate (Antacid Maximum) 1000 MG chewable tablet, Chew 500 mg 3 (Three) Times a Day., Disp: , Rfl:   •  CBD (cannabidiol) oral oil,  Take  by mouth 2 (Two) Times a Day., Disp: , Rfl:   •  chlorhexidine (PERIDEX) 0.12 % solution, Swish 15 mL around mouth for 30 seconds, then spit out. Use twice daily as directed. , Disp: 473 mL, Rfl: 0  •  cholecalciferol (VITAMIN D3) 25 MCG (1000 UT) tablet, Take 1,000 Units by mouth Daily., Disp: , Rfl:   •  clonazePAM (KlonoPIN) 0.5 MG tablet, Take 1 tablet by mouth At Night As Needed, Disp: 30 tablet, Rfl: 1  •  Dextromethorphan-guaiFENesin (Mucus-DM)  MG tablet sustained-release 12 hour, Take  by mouth., Disp: , Rfl:   •  dicyclomine (BENTYL) 10 MG capsule, Take 1 capsule by mouth 3 (Three) Times a Day., Disp: 270 capsule, Rfl: 3  •  diphenhydrAMINE (Benadryl Allergy) 25 MG tablet, Take 1-2 tablets by mouth Every 4-6 Hours As Needed., Disp: 90 tablet, Rfl: 11  •  EPINEPHrine (EPIPEN) 0.3 MG/0.3ML solution auto-injector injection, Use as directed for acute allergic reaction, Disp: 2 each, Rfl: 3  •  famotidine (PEPCID) 20 MG tablet, Take 20 mg by mouth 2 (Two) Times a Day., Disp: , Rfl:   •  ferrous sulfate 325 (65 Fe) MG tablet, Take  by mouth., Disp: , Rfl:   •  fexofenadine (ALLEGRA) 180 MG tablet, Take 180 mg by mouth Daily., Disp: , Rfl:   •  fluticasone (FLONASE) 50 MCG/ACT nasal spray, Instill 2 sprays into each nostril daily., Disp: 16 g, Rfl: 11  •  fluticasone (FLOVENT DISKUS) 50 MCG/BLIST diskus inhaler, Inhale 1 puff 2 (Two) Times a Day., Disp: , Rfl:   •  fluticasone-salmeterol (Advair HFA) 230-21 MCG/ACT inhaler, Inhale 2 puffs by mouth 2 (Two) Times a Day., Disp: 12 g, Rfl: 11  •  fluticasone-salmeterol (ADVAIR) 500-50 MCG/DOSE DISKUS, Inhale 1 puff., Disp: , Rfl:   •  folic acid (FOLVITE) 1 MG tablet, Take 1 tablet by mouth Daily., Disp: 90 tablet, Rfl: 3  •  furosemide (LASIX) 40 MG tablet, Take 1 tablet by mouth Daily., Disp: 90 tablet, Rfl: 3  •  ipratropium (ATROVENT) 0.06 % nasal spray, Spray 2 sprays into the nostril(s) every night at bedtime as directed by provider, Disp: 15 mL,  Rfl: 12  •  ipratropium-albuterol (DUO-NEB) 0.5-2.5 mg/3 ml nebulizer, Inhale contents of 1 vial (3mL) via nebulizer every 4-6 hours as needed for coughing, wheezing, or shortness of breath., Disp: 300 mL, Rfl: 3  •  isosorbide dinitrate (ISORDIL) 10 MG tablet, Take 1 tablet by mouth 3 (Three) Times a Day., Disp: 270 tablet, Rfl: 3  •  levothyroxine (SYNTHROID, LEVOTHROID) 50 MCG tablet, Take 1 tablet by mouth Daily., Disp: 90 tablet, Rfl: 3  •  Magnesium 250 MG tablet, Take 500 mg by mouth Daily. Two tablets, Disp: , Rfl:   •  melatonin 5 MG tablet tablet, Take 5 mg by mouth Every Night., Disp: , Rfl:   •  methotrexate 2.5 MG tablet, Take 2 tablets by mouth 1 (One) Time Per Week., Disp: 8 tablet, Rfl: 5  •  montelukast (SINGULAIR) 10 MG tablet, Take 10 mg by mouth Every Night., Disp: , Rfl:   •  multivitamin with minerals (ONE-A-DAY PROACTIVE 65+ PO), Take 1 tablet by mouth Daily., Disp: , Rfl:   •  omeprazole (priLOSEC) 40 MG capsule, Take 1 capsule by mouth 2 (two) times a day., Disp: 90 capsule, Rfl: 3  •  ondansetron (ZOFRAN) 4 MG tablet, Take 1 tablet by mouth Every 8 (Eight) Hours As Needed for Nausea or Vomiting., Disp: 30 tablet, Rfl: 11  •  potassium citrate (UROCIT-K) 10 MEQ (1080 MG) CR tablet, Take 1 tablet by mouth Daily., Disp: 90 tablet, Rfl: 3  •  predniSONE (DELTASONE) 5 MG tablet, Take 1 tablet by mouth Daily., Disp: 30 tablet, Rfl: 1  •  promethazine-dextromethorphan (PROMETHAZINE-DM) 6.25-15 MG/5ML syrup, Take 5 mL by mouth 4 (Four) Times a Day As Needed for Cough., Disp: 240 mL, Rfl: 0  •  Spacer/Aero-Holding Chambers (AeroChamber Plus Wali-Vu) misc, Use as directed with albuterol inhaler, Disp: 1 each, Rfl: 0  •  tiotropium bromide monohydrate (SPIRIVA RESPIMAT) 2.5 MCG/ACT aerosol solution inhaler, Inhale 2 puffs by mouth daily, Disp: 4 g, Rfl: 11  •  tiZANidine (ZANAFLEX) 4 MG tablet, Take 1 tablet by mouth Every Night., Disp: 90 tablet, Rfl: 1  •  ubrogepant (ubrogepant) 100 MG tablet, Take  "1 tablet by mouth once per day as needed at onset of migraine, Disp: 10 tablet, Rfl: 11  •  valsartan (DIOVAN) 160 MG tablet, Take 1 tablet by mouth Daily., Disp: 90 tablet, Rfl: 3  •  vitamin B-12 (CYANOCOBALAMIN) 1000 MCG tablet, Take 1,000 mcg by mouth 2 (two) times a day., Disp: , Rfl:   •  sucralfate (Carafate) 1 g tablet, Take 1 tablet by mouth 4 (Four) Times a Day., Disp: 120 tablet, Rfl: 1    Current Facility-Administered Medications:   •  heparin injection 500 Units, 5 mL, Intravenous, PRN, Sanjeev Rawls MD  •  sodium chloride 0.9 % flush 10 mL, 10 mL, Intravenous, Q12H, Sanjeev Rawls MD  •  sodium chloride 0.9 % flush 10 mL, 10 mL, Intravenous, PRN, Sanjeev Rawls MD  •  sodium chloride 0.9 % flush 20 mL, 20 mL, Intravenous, PRN, Sanjeev Rawls MD    Objective      Blood pressure 118/84, pulse 86, temperature 97.8 °F (36.6 °C), temperature source Temporal, resp. rate 16, height 165.1 cm (65\"), weight 114 kg (252 lb), SpO2 95 %, not currently breastfeeding.    Physical Exam     General Appearance:    Alert, cooperative, no distress, appears stated age   Head:    Normocephalic, without obvious abnormality, atraumatic   Eyes:    PERRL, conjunctiva/corneas clear, EOM's intact   Ears:    Normal TM's and external ear canals, both ears   Nose:   Nares normal, septum midline, mucosa normal, no drainage   or sinus tenderness   Throat:   Lips, mucosa, and tongue normal; teeth and gums normal   Neck:   Supple, symmetrical, trachea midline, no adenopathy;        thyroid:  No enlargement/tenderness/nodules; no carotid    bruit or JVD   Back:     Symmetric, no curvature, ROM normal, no CVA tenderness   Lungs:     Clear to auscultation bilaterally, respirations unlabored   Chest wall:    No tenderness or deformity   Heart:    Regular rate and rhythm, S1 and S2 normal, no murmur,        rub or gallop   Abdomen:     Soft, non-tender, bowel sounds active all four quadrants,     no masses, no organomegaly "   Extremities:   Extremities normal, atraumatic, no cyanosis or edema   Pulses:   2+ and symmetric all extremities   Skin:   Skin color, texture, turgor normal, no rashes or lesions   Lymph nodes:   Cervical, supraclavicular, and axillary nodes normal   Neurologic:   CNII-XII intact. Normal strength, sensation and reflexes       throughout      Results for orders placed or performed in visit on 06/07/21   Hemoglobin A1c    Specimen: Blood   Result Value Ref Range    Hemoglobin A1C 6.70 (H) 4.80 - 5.60 %   Comprehensive Metabolic Panel    Specimen: Blood   Result Value Ref Range    Glucose 126 (H) 65 - 99 mg/dL    BUN 14 8 - 23 mg/dL    Creatinine 1.61 (H) 0.57 - 1.00 mg/dL    eGFR Non African Am 32 (L) >60 mL/min/1.73    eGFR  Am 39 (L) >60 mL/min/1.73    BUN/Creatinine Ratio 8.7 7.0 - 25.0    Sodium 135 (L) 136 - 145 mmol/L    Potassium 4.7 3.5 - 5.2 mmol/L    Chloride 98 98 - 107 mmol/L    Total CO2 26.1 22.0 - 29.0 mmol/L    Calcium 9.4 8.6 - 10.5 mg/dL    Total Protein 7.1 6.0 - 8.5 g/dL    Albumin 4.70 3.50 - 5.20 g/dL    Globulin 2.4 gm/dL    A/G Ratio 2.0 g/dL    Total Bilirubin 0.3 0.0 - 1.2 mg/dL    Alkaline Phosphatase 68 39 - 117 U/L    AST (SGOT) 32 1 - 32 U/L    ALT (SGPT) 20 1 - 33 U/L   Uric Acid    Specimen: Blood   Result Value Ref Range    Uric Acid 4.1 2.4 - 5.7 mg/dL   CK    Specimen: Blood   Result Value Ref Range    Creatine Kinase 161 20 - 180 U/L   Myoglobin, Serum    Specimen: Blood   Result Value Ref Range    Myoglobin 88.6 (H) 25.0 - 58.0 ng/mL   CBC & Differential    Specimen: Blood   Result Value Ref Range    WBC 4.41 3.40 - 10.80 10*3/mm3    RBC 3.91 3.77 - 5.28 10*6/mm3    Hemoglobin 12.7 12.0 - 15.9 g/dL    Hematocrit 37.2 34.0 - 46.6 %    MCV 95.1 79.0 - 97.0 fL    MCH 32.5 26.6 - 33.0 pg    MCHC 34.1 31.5 - 35.7 g/dL    RDW 13.7 12.3 - 15.4 %    Platelets 269 140 - 450 10*3/mm3    Neutrophil Rel % 56.0 42.7 - 76.0 %    Lymphocyte Rel % 27.4 19.6 - 45.3 %    Monocyte Rel %  10.0 5.0 - 12.0 %    Eosinophil Rel % 5.0 0.3 - 6.2 %    Basophil Rel % 1.1 0.0 - 1.5 %    Neutrophils Absolute 2.47 1.70 - 7.00 10*3/mm3    Lymphocytes Absolute 1.21 0.70 - 3.10 10*3/mm3    Monocytes Absolute 0.44 0.10 - 0.90 10*3/mm3    Eosinophils Absolute 0.22 0.00 - 0.40 10*3/mm3    Basophils Absolute 0.05 0.00 - 0.20 10*3/mm3    Immature Granulocyte Rel % 0.5 0.0 - 0.5 %    Immature Grans Absolute 0.02 0.00 - 0.05 10*3/mm3    nRBC 0.0 0.0 - 0.2 /100 WBC         Assessment/Plan       Diagnoses and all orders for this visit:    1. Nausea (Primary)  -     Basic metabolic panel  -     Helicobacter Pylori, IgA IgG IgM  -     Immunoglobulins A/E/G/M Serum  -     Glia(IgA / G) & TTG(IgA / G)  -     Endomysial Antibody, IgA / IGG Titer    2. Acute renal failure superimposed on chronic kidney disease, unspecified CKD stage, unspecified acute renal failure type (CMS/[facility])    Other orders  -     sucralfate (Carafate) 1 g tablet; Take 1 tablet by mouth 4 (Four) Times a Day.  Dispense: 120 tablet; Refill: 1      Return in about 1 week (around 7/7/2021).          There are no Patient Instructions on file for this visit.     Sanjeev Rawls MD    Assessment/Plan

## 2021-07-08 LAB — ENDOMYSIAL ANTIBODY TITER IGG: NORMAL

## 2021-07-09 ENCOUNTER — OFFICE VISIT (OUTPATIENT)
Dept: INTERNAL MEDICINE | Facility: CLINIC | Age: 66
End: 2021-07-09

## 2021-07-09 VITALS
TEMPERATURE: 97.7 F | RESPIRATION RATE: 16 BRPM | SYSTOLIC BLOOD PRESSURE: 144 MMHG | OXYGEN SATURATION: 96 % | BODY MASS INDEX: 42.15 KG/M2 | WEIGHT: 253 LBS | DIASTOLIC BLOOD PRESSURE: 80 MMHG | HEIGHT: 65 IN | HEART RATE: 87 BPM

## 2021-07-09 DIAGNOSIS — N18.2 CHRONIC RENAL IMPAIRMENT, STAGE 2 (MILD): ICD-10-CM

## 2021-07-09 DIAGNOSIS — E87.1 HYPONATREMIA: ICD-10-CM

## 2021-07-09 DIAGNOSIS — Z88.1 DRUG ALLERGY, ANTIBIOTIC: Primary | ICD-10-CM

## 2021-07-09 DIAGNOSIS — A04.8 H. PYLORI INFECTION: ICD-10-CM

## 2021-07-09 PROCEDURE — 99214 OFFICE O/P EST MOD 30 MIN: CPT | Performed by: INTERNAL MEDICINE

## 2021-07-09 NOTE — PROGRESS NOTES
Subjective     Patient ID: Raquel Mariee is a 65 y.o. female. Patient is here for management of multiple medical problems.     Chief Complaint   Patient presents with   • Follow-up     nausea, acid refulx, hpylori     History of Present Illness       Pt with h pyloir and multiple abx allergies.  Biaxin bad     The following portions of the patient's history were reviewed and updated as appropriate: allergies, current medications, past family history, past medical history, past social history, past surgical history and problem list.    Review of Systems    Current Outpatient Medications:   •  albuterol sulfate  (90 Base) MCG/ACT inhaler, Inhale 2 puffs by mouth every 4-6 hours as needed for cough, wheeze, shortness of breath. Use with vortex spacer, Disp: 8.5 g, Rfl: 3  •  allopurinol (Zyloprim) 100 MG tablet, Take 1 tablet by mouth Daily., Disp: 90 tablet, Rfl: 3  •  amitriptyline (ELAVIL) 10 MG tablet, Take 2 tablets by mouth Every Evening., Disp: 90 tablet, Rfl: 3  •  Azelastine HCl 137 MCG/SPRAY solution, Instill 1-2 sprays in each nostril twice a day as needed, Disp: 30 mL, Rfl: 11  •  azithromycin (Zithromax) 250 MG tablet, Take 4 tablets by mouth on day of dental surgery., Disp: 12 tablet, Rfl: 0  •  betamethasone valerate (VALISONE) 0.1 % cream, Apply topically to the appropriate area as directed Daily., Disp: 45 g, Rfl: 11  •  buPROPion SR (WELLBUTRIN SR) 150 MG 12 hr tablet, Take 1 tablet by mouth 2 (two) times a day., Disp: 180 tablet, Rfl: 3  •  calcium carbonate (Antacid Maximum) 1000 MG chewable tablet, Chew 500 mg 3 (Three) Times a Day., Disp: , Rfl:   •  CBD (cannabidiol) oral oil, Take  by mouth 2 (Two) Times a Day., Disp: , Rfl:   •  chlorhexidine (PERIDEX) 0.12 % solution, Swish 15 mL around mouth for 30 seconds, then spit out. Use twice daily as directed. , Disp: 473 mL, Rfl: 0  •  cholecalciferol (VITAMIN D3) 25 MCG (1000 UT) tablet, Take 1,000 Units by mouth Daily., Disp: , Rfl:   •   clonazePAM (KlonoPIN) 0.5 MG tablet, Take 1 tablet by mouth At Night As Needed, Disp: 30 tablet, Rfl: 1  •  Dextromethorphan-guaiFENesin (Mucus-DM)  MG tablet sustained-release 12 hour, Take  by mouth., Disp: , Rfl:   •  dicyclomine (BENTYL) 10 MG capsule, Take 1 capsule by mouth 3 (Three) Times a Day., Disp: 270 capsule, Rfl: 3  •  diphenhydrAMINE (Benadryl Allergy) 25 MG tablet, Take 1-2 tablets by mouth Every 4-6 Hours As Needed., Disp: 90 tablet, Rfl: 11  •  EPINEPHrine (EPIPEN) 0.3 MG/0.3ML solution auto-injector injection, Use as directed for acute allergic reaction, Disp: 2 each, Rfl: 3  •  famotidine (PEPCID) 20 MG tablet, Take 20 mg by mouth 2 (Two) Times a Day., Disp: , Rfl:   •  ferrous sulfate 325 (65 Fe) MG tablet, Take  by mouth., Disp: , Rfl:   •  fexofenadine (ALLEGRA) 180 MG tablet, Take 180 mg by mouth Daily., Disp: , Rfl:   •  fluticasone (FLONASE) 50 MCG/ACT nasal spray, Instill 2 sprays into each nostril daily., Disp: 16 g, Rfl: 11  •  fluticasone (FLOVENT DISKUS) 50 MCG/BLIST diskus inhaler, Inhale 1 puff 2 (Two) Times a Day., Disp: , Rfl:   •  fluticasone-salmeterol (Advair HFA) 230-21 MCG/ACT inhaler, Inhale 2 puffs by mouth 2 (Two) Times a Day., Disp: 12 g, Rfl: 11  •  fluticasone-salmeterol (ADVAIR) 500-50 MCG/DOSE DISKUS, Inhale 1 puff., Disp: , Rfl:   •  folic acid (FOLVITE) 1 MG tablet, Take 1 tablet by mouth Daily., Disp: 90 tablet, Rfl: 3  •  furosemide (LASIX) 40 MG tablet, Take 1 tablet by mouth Daily., Disp: 90 tablet, Rfl: 3  •  ipratropium (ATROVENT) 0.06 % nasal spray, Spray 2 sprays into the nostril(s) every night at bedtime as directed by provider, Disp: 15 mL, Rfl: 12  •  ipratropium-albuterol (DUO-NEB) 0.5-2.5 mg/3 ml nebulizer, Inhale contents of 1 vial (3mL) via nebulizer every 4-6 hours as needed for coughing, wheezing, or shortness of breath., Disp: 300 mL, Rfl: 3  •  isosorbide dinitrate (ISORDIL) 10 MG tablet, Take 1 tablet by mouth 3 (Three) Times a Day., Disp:  270 tablet, Rfl: 3  •  levothyroxine (SYNTHROID, LEVOTHROID) 50 MCG tablet, Take 1 tablet by mouth Daily., Disp: 90 tablet, Rfl: 3  •  Magnesium 250 MG tablet, Take 500 mg by mouth Daily. Two tablets, Disp: , Rfl:   •  melatonin 5 MG tablet tablet, Take 5 mg by mouth Every Night., Disp: , Rfl:   •  methotrexate 2.5 MG tablet, Take 2 tablets by mouth 1 (One) Time Per Week., Disp: 8 tablet, Rfl: 5  •  montelukast (SINGULAIR) 10 MG tablet, Take 10 mg by mouth Every Night., Disp: , Rfl:   •  multivitamin with minerals (ONE-A-DAY PROACTIVE 65+ PO), Take 1 tablet by mouth Daily., Disp: , Rfl:   •  omeprazole (priLOSEC) 40 MG capsule, Take 1 capsule by mouth 2 (two) times a day., Disp: 90 capsule, Rfl: 3  •  ondansetron (ZOFRAN) 4 MG tablet, Take 1 tablet by mouth Every 8 (Eight) Hours As Needed for Nausea or Vomiting., Disp: 30 tablet, Rfl: 11  •  potassium citrate (UROCIT-K) 10 MEQ (1080 MG) CR tablet, Take 1 tablet by mouth Daily., Disp: 90 tablet, Rfl: 3  •  predniSONE (DELTASONE) 5 MG tablet, Take 1 tablet by mouth Daily., Disp: 30 tablet, Rfl: 1  •  promethazine-dextromethorphan (PROMETHAZINE-DM) 6.25-15 MG/5ML syrup, Take 5 mL by mouth 4 (Four) Times a Day As Needed for Cough., Disp: 240 mL, Rfl: 0  •  Spacer/Aero-Holding Chambers (AeroChamber Plus Wali-Vu) misc, Use as directed with albuterol inhaler, Disp: 1 each, Rfl: 0  •  sucralfate (Carafate) 1 g tablet, Take 1 tablet by mouth 4 (Four) Times a Day., Disp: 120 tablet, Rfl: 1  •  tiotropium bromide monohydrate (SPIRIVA RESPIMAT) 2.5 MCG/ACT aerosol solution inhaler, Inhale 2 puffs by mouth daily, Disp: 4 g, Rfl: 11  •  tiZANidine (ZANAFLEX) 4 MG tablet, Take 1 tablet by mouth Every Night., Disp: 90 tablet, Rfl: 1  •  ubrogepant (ubrogepant) 100 MG tablet, Take 1 tablet by mouth once per day as needed at onset of migraine, Disp: 10 tablet, Rfl: 11  •  valsartan (DIOVAN) 160 MG tablet, Take 1 tablet by mouth Daily., Disp: 90 tablet, Rfl: 3  •  vitamin B-12  "(CYANOCOBALAMIN) 1000 MCG tablet, Take 1,000 mcg by mouth 2 (two) times a day., Disp: , Rfl:     Current Facility-Administered Medications:   •  heparin injection 500 Units, 5 mL, Intravenous, PRN, Sanjeev Rawls MD  •  sodium chloride 0.9 % flush 10 mL, 10 mL, Intravenous, Q12H, Sanjeev Rawls MD  •  sodium chloride 0.9 % flush 10 mL, 10 mL, Intravenous, PRN, Sanjeev Rawls MD  •  sodium chloride 0.9 % flush 20 mL, 20 mL, Intravenous, PRN, Sanjeev Rawls MD    Objective      Blood pressure 144/80, pulse 87, temperature 97.7 °F (36.5 °C), temperature source Temporal, resp. rate 16, height 165.1 cm (65\"), weight 115 kg (253 lb), SpO2 96 %, not currently breastfeeding.    Physical Exam     General Appearance:    Alert, cooperative, no distress, appears stated age   Head:    Normocephalic, without obvious abnormality, atraumatic   Eyes:    PERRL, conjunctiva/corneas clear, EOM's intact   Ears:    Normal TM's and external ear canals, both ears   Nose:   Nares normal, septum midline, mucosa normal, no drainage   or sinus tenderness   Throat:   Lips, mucosa, and tongue normal; teeth and gums normal   Neck:   Supple, symmetrical, trachea midline, no adenopathy;        thyroid:  No enlargement/tenderness/nodules; no carotid    bruit or JVD   Back:     Symmetric, no curvature, ROM normal, no CVA tenderness   Lungs:     Clear to auscultation bilaterally, respirations unlabored   Chest wall:    No tenderness or deformity   Heart:    Regular rate and rhythm, S1 and S2 normal, 2/6 murmur,        rub or gallop   Abdomen:     Soft, non-tender, bowel sounds active all four quadrants,     no masses, no organomegaly   Extremities:   Extremities normal, atraumatic, no cyanosis or edema   Pulses:   2+ and symmetric all extremities   Skin:   Skin color, texture, turgor normal, no rashes or lesions   Lymph nodes:   Cervical, supraclavicular, and axillary nodes normal   Neurologic:   CNII-XII intact. Normal strength, sensation " and reflexes       throughout      Results for orders placed or performed in visit on 06/30/21   Basic metabolic panel    Specimen: Blood   Result Value Ref Range    Glucose 178 (H) 65 - 99 mg/dL    BUN 11 8 - 27 mg/dL    Creatinine 1.29 (H) 0.57 - 1.00 mg/dL    eGFR Non African Am 44 (L) >59 mL/min/1.73    eGFR  Am 50 (L) >59 mL/min/1.73    BUN/Creatinine Ratio 9 (L) 12 - 28    Sodium 129 (L) 134 - 144 mmol/L    Potassium 4.9 3.5 - 5.2 mmol/L    Chloride 92 (L) 96 - 106 mmol/L    Total CO2 23 20 - 29 mmol/L    Calcium 9.2 8.7 - 10.3 mg/dL   Helicobacter Pylori, IgA IgG IgM    Specimen: Blood   Result Value Ref Range    H. pylori IgG 1.72 (H) 0.00 - 0.79 Index Value    H. pylori, IgA ABS <9.0 0.0 - 8.9 units    H. Pylori, IgM <9.0 0.0 - 8.9 units   Immunoglobulins A/E/G/M Serum    Specimen: Blood   Result Value Ref Range    IgG 1,198 586 - 1,602 mg/dL    IgA 148 87 - 352 mg/dL    IgM 29 26 - 217 mg/dL    IgE 165 6 - 495 IU/mL   Glia(IgA / G) & TTG(IgA / G)    Specimen: Blood   Result Value Ref Range    Gliadin Deamidated Peptide Ab, IgA 5 0 - 19 units    Deaminated Gliadin Ab IgG 3 0 - 19 units    Tissue Transglutaminase IgA <2 0 - 3 U/mL    Tissue Transglutaminase IgG 4 0 - 5 U/mL   Endomysial Antibody, IgA / IGG Titer    Specimen: Blood   Result Value Ref Range    Endomysial Antibody Titer IgA <1:2.5 Titer    Endomysial Antibody Titer IgG <1:2.5 Titer   Endomysial Antibody Titer IgG   Result Value Ref Range    Endomysial Antibody Titer IgG           Assessment/Plan   Labs with cri improving.    Hyponatremia.  Off pred.  Pt went out drank a litter of water and repeated labs.  Likely source to hyponatremia.    Cr better with hydration not resolved.    stil wth gerd and waking. Not as bad with sucrate.     Diagnoses and all orders for this visit:    1. Drug allergy, antibiotic (Primary)  -     Ambulatory Referral to Allergy    2. H. pylori infection    3. Hyponatremia    4. Chronic renal impairment, stage 2  (mild)      Return in about 3 months (around 10/9/2021).          There are no Patient Instructions on file for this visit.     Sanjeev Rawls MD    Assessment/Plan

## 2021-07-12 ENCOUNTER — HOSPITAL ENCOUNTER (OUTPATIENT)
Dept: INFUSION THERAPY | Facility: HOSPITAL | Age: 66
Setting detail: INFUSION SERIES
Discharge: HOME | End: 2021-07-12

## 2021-07-12 VITALS
OXYGEN SATURATION: 95 % | TEMPERATURE: 98.4 F | HEART RATE: 81 BPM | DIASTOLIC BLOOD PRESSURE: 85 MMHG | RESPIRATION RATE: 18 BRPM | HEIGHT: 65 IN | BODY MASS INDEX: 42.15 KG/M2 | SYSTOLIC BLOOD PRESSURE: 145 MMHG | WEIGHT: 253 LBS

## 2021-07-12 DIAGNOSIS — D80.1 COMMON VARIABLE AGAMMAGLOBULINEMIA (HCC): Primary | ICD-10-CM

## 2021-07-12 DIAGNOSIS — Z95.828 PORT-A-CATH IN PLACE: ICD-10-CM

## 2021-07-12 LAB
ANION GAP SERPL CALCULATED.3IONS-SCNC: 12.1 MMOL/L (ref 5–15)
BUN SERPL-MCNC: 8 MG/DL (ref 8–23)
BUN/CREAT SERPL: 6.6 (ref 7–25)
CALCIUM SPEC-SCNC: 8.9 MG/DL (ref 8.6–10.5)
CHLORIDE SERPL-SCNC: 96 MMOL/L (ref 98–107)
CO2 SERPL-SCNC: 22.9 MMOL/L (ref 22–29)
CREAT SERPL-MCNC: 1.21 MG/DL (ref 0.57–1)
GFR SERPL CREATININE-BSD FRML MDRD: 45 ML/MIN/1.73
GLUCOSE SERPL-MCNC: 152 MG/DL (ref 65–99)
POTASSIUM SERPL-SCNC: 4.1 MMOL/L (ref 3.5–5.2)
SODIUM SERPL-SCNC: 131 MMOL/L (ref 136–145)

## 2021-07-12 PROCEDURE — 25010000002 IMMUNE GLOBULIN (HUMAN) 20 GM/200ML SOLUTION: Performed by: ALLERGY & IMMUNOLOGY

## 2021-07-12 PROCEDURE — 80048 BASIC METABOLIC PNL TOTAL CA: CPT | Performed by: INTERNAL MEDICINE

## 2021-07-12 PROCEDURE — 96365 THER/PROPH/DIAG IV INF INIT: CPT

## 2021-07-12 PROCEDURE — 25010000002 HEPARIN LOCK FLUSH PER 10 UNITS: Performed by: INTERNAL MEDICINE

## 2021-07-12 PROCEDURE — 96366 THER/PROPH/DIAG IV INF ADDON: CPT

## 2021-07-12 RX ORDER — PREDNISONE 20 MG/1
40 TABLET ORAL ONCE AS NEEDED
Status: CANCELLED
Start: 2021-08-09

## 2021-07-12 RX ORDER — METHYLPREDNISOLONE SODIUM SUCCINATE 125 MG/2ML
50 INJECTION, POWDER, LYOPHILIZED, FOR SOLUTION INTRAMUSCULAR; INTRAVENOUS ONCE AS NEEDED
Status: DISCONTINUED | OUTPATIENT
Start: 2021-07-12 | End: 2021-07-14 | Stop reason: HOSPADM

## 2021-07-12 RX ORDER — HEPARIN SODIUM (PORCINE) LOCK FLUSH IV SOLN 100 UNIT/ML 100 UNIT/ML
500 SOLUTION INTRAVENOUS AS NEEDED
Status: CANCELLED | OUTPATIENT
Start: 2021-07-12

## 2021-07-12 RX ORDER — PREDNISONE 20 MG/1
40 TABLET ORAL ONCE
Status: DISCONTINUED | OUTPATIENT
Start: 2021-07-12 | End: 2021-07-14 | Stop reason: HOSPADM

## 2021-07-12 RX ORDER — PREDNISONE 20 MG/1
40 TABLET ORAL ONCE
Status: CANCELLED
Start: 2021-08-09 | End: 2021-08-09

## 2021-07-12 RX ORDER — HEPARIN SODIUM (PORCINE) LOCK FLUSH IV SOLN 100 UNIT/ML 100 UNIT/ML
500 SOLUTION INTRAVENOUS AS NEEDED
Status: DISCONTINUED | OUTPATIENT
Start: 2021-07-12 | End: 2021-07-14 | Stop reason: HOSPADM

## 2021-07-12 RX ORDER — DIPHENHYDRAMINE HCL 25 MG
50 CAPSULE ORAL ONCE
Status: DISCONTINUED | OUTPATIENT
Start: 2021-07-12 | End: 2021-07-14 | Stop reason: HOSPADM

## 2021-07-12 RX ORDER — PREDNISONE 20 MG/1
40 TABLET ORAL ONCE AS NEEDED
Status: DISCONTINUED | OUTPATIENT
Start: 2021-07-12 | End: 2021-07-14 | Stop reason: HOSPADM

## 2021-07-12 RX ORDER — DIPHENHYDRAMINE HCL 25 MG
50 CAPSULE ORAL ONCE AS NEEDED
Status: CANCELLED
Start: 2021-08-09

## 2021-07-12 RX ORDER — SODIUM CHLORIDE 0.9 % (FLUSH) 0.9 %
10 SYRINGE (ML) INJECTION AS NEEDED
Status: CANCELLED | OUTPATIENT
Start: 2021-07-12

## 2021-07-12 RX ORDER — ACETAMINOPHEN 325 MG/1
325 TABLET ORAL ONCE
Status: DISCONTINUED | OUTPATIENT
Start: 2021-07-12 | End: 2021-07-14 | Stop reason: HOSPADM

## 2021-07-12 RX ORDER — EPINEPHRINE 1 MG/ML
0.3 INJECTION, SOLUTION INTRAMUSCULAR; SUBCUTANEOUS ONCE AS NEEDED
Status: DISCONTINUED | OUTPATIENT
Start: 2021-07-12 | End: 2021-07-14 | Stop reason: HOSPADM

## 2021-07-12 RX ORDER — METHYLPREDNISOLONE SODIUM SUCCINATE 125 MG/2ML
50 INJECTION, POWDER, LYOPHILIZED, FOR SOLUTION INTRAMUSCULAR; INTRAVENOUS ONCE AS NEEDED
Status: CANCELLED
Start: 2021-08-09

## 2021-07-12 RX ORDER — SODIUM CHLORIDE 0.9 % (FLUSH) 0.9 %
10 SYRINGE (ML) INJECTION AS NEEDED
Status: DISCONTINUED | OUTPATIENT
Start: 2021-07-12 | End: 2021-07-14 | Stop reason: HOSPADM

## 2021-07-12 RX ORDER — DIPHENHYDRAMINE HCL 25 MG
50 CAPSULE ORAL ONCE
Status: CANCELLED
Start: 2021-08-09 | End: 2021-08-09

## 2021-07-12 RX ORDER — EPINEPHRINE 1 MG/ML
0.3 INJECTION, SOLUTION INTRAMUSCULAR; SUBCUTANEOUS ONCE AS NEEDED
Status: CANCELLED
Start: 2021-08-09

## 2021-07-12 RX ORDER — ACETAMINOPHEN 325 MG/1
325 TABLET ORAL ONCE
Status: CANCELLED | OUTPATIENT
Start: 2021-08-09

## 2021-07-12 RX ORDER — DIPHENHYDRAMINE HCL 25 MG
50 CAPSULE ORAL ONCE AS NEEDED
Status: DISCONTINUED | OUTPATIENT
Start: 2021-07-12 | End: 2021-07-14 | Stop reason: HOSPADM

## 2021-07-12 RX ADMIN — IMMUNE GLOBULIN INFUSION (HUMAN) 20 G: 100 INJECTION, SOLUTION INTRAVENOUS; SUBCUTANEOUS at 09:24

## 2021-07-12 RX ADMIN — HEPARIN 500 UNITS: 100 SYRINGE at 12:19

## 2021-07-12 RX ADMIN — IMMUNE GLOBULIN INFUSION (HUMAN) 20 G: 100 INJECTION, SOLUTION INTRAVENOUS; SUBCUTANEOUS at 11:19

## 2021-07-14 RX ORDER — OMEPRAZOLE 40 MG/1
40 CAPSULE, DELAYED RELEASE ORAL 2 TIMES DAILY
Qty: 180 CAPSULE | Refills: 3 | Status: SHIPPED | OUTPATIENT
Start: 2021-07-14 | End: 2022-07-14 | Stop reason: SDUPTHER

## 2021-07-28 ENCOUNTER — TELEPHONE (OUTPATIENT)
Dept: INTERNAL MEDICINE | Facility: CLINIC | Age: 66
End: 2021-07-28

## 2021-07-28 NOTE — TELEPHONE ENCOUNTER
"Dr. Rawls placed a referral for patient to see Dr. Garza @ [facility] but patient is already established. I let Dr. Rawls know that she is already a patient and he asked if could call patient to have her reach out and schedule follow up but he wanted to make sure that patient let Dr. Garza know \"Pt with multiple allergies to abx. I need to use Augmentin, flagyl and biaxin.  Has H pylori.\".     Contacted [facility] ([phone]) to relay the message. [facility] has patient scheduled on 08/30/21 for testing and took the above information to pass along to Dr. Garza.      "

## 2021-07-29 ENCOUNTER — OFFICE VISIT (OUTPATIENT)
Dept: INTERNAL MEDICINE | Facility: CLINIC | Age: 66
End: 2021-07-29

## 2021-07-29 VITALS
HEART RATE: 90 BPM | OXYGEN SATURATION: 98 % | BODY MASS INDEX: 42.15 KG/M2 | SYSTOLIC BLOOD PRESSURE: 145 MMHG | WEIGHT: 253 LBS | RESPIRATION RATE: 16 BRPM | HEIGHT: 65 IN | DIASTOLIC BLOOD PRESSURE: 106 MMHG | TEMPERATURE: 97.8 F

## 2021-07-29 DIAGNOSIS — J45.41 MODERATE PERSISTENT ASTHMA WITH EXACERBATION: Primary | ICD-10-CM

## 2021-07-29 PROCEDURE — U0004 COV-19 TEST NON-CDC HGH THRU: HCPCS | Performed by: INTERNAL MEDICINE

## 2021-07-29 PROCEDURE — 99214 OFFICE O/P EST MOD 30 MIN: CPT | Performed by: INTERNAL MEDICINE

## 2021-07-29 RX ORDER — METHYLPREDNISOLONE 4 MG/1
TABLET ORAL
Qty: 21 EACH | Refills: 0 | Status: SHIPPED | OUTPATIENT
Start: 2021-07-29 | End: 2021-11-10

## 2021-07-29 RX ORDER — DOXYCYCLINE 100 MG/1
100 CAPSULE ORAL EVERY 12 HOURS SCHEDULED
Qty: 20 CAPSULE | Refills: 0 | Status: SHIPPED | OUTPATIENT
Start: 2021-07-29 | End: 2021-10-07

## 2021-07-29 NOTE — PROGRESS NOTES
Subjective     Patient ID: Raquel Mariee is a 65 y.o. female. Patient is here for management of multiple medical problems.     Chief Complaint   Patient presents with   • Asthma     cough, lack of sleep, pain on right side of face,difficulty breathing      History of Present Illness     Asthma excerbation.        The following portions of the patient's history were reviewed and updated as appropriate: allergies, current medications, past family history, past medical history, past social history, past surgical history and problem list.    Review of Systems    Current Outpatient Medications:   •  albuterol sulfate  (90 Base) MCG/ACT inhaler, Inhale 2 puffs by mouth every 4-6 hours as needed for cough, wheeze, shortness of breath. Use with vortex spacer, Disp: 8.5 g, Rfl: 3  •  allopurinol (Zyloprim) 100 MG tablet, Take 1 tablet by mouth Daily., Disp: 90 tablet, Rfl: 3  •  amitriptyline (ELAVIL) 10 MG tablet, Take 2 tablets by mouth Every Evening., Disp: 90 tablet, Rfl: 3  •  Azelastine HCl 137 MCG/SPRAY solution, Instill 1-2 sprays in each nostril twice a day as needed, Disp: 30 mL, Rfl: 11  •  azithromycin (Zithromax) 250 MG tablet, Take 4 tablets by mouth on day of dental surgery., Disp: 12 tablet, Rfl: 0  •  betamethasone valerate (VALISONE) 0.1 % cream, Apply topically to the appropriate area as directed Daily., Disp: 45 g, Rfl: 11  •  buPROPion SR (WELLBUTRIN SR) 150 MG 12 hr tablet, Take 1 tablet by mouth 2 (two) times a day., Disp: 180 tablet, Rfl: 3  •  calcium carbonate (Antacid Maximum) 1000 MG chewable tablet, Chew 500 mg 3 (Three) Times a Day., Disp: , Rfl:   •  CBD (cannabidiol) oral oil, Take  by mouth 2 (Two) Times a Day., Disp: , Rfl:   •  chlorhexidine (PERIDEX) 0.12 % solution, Swish 15 mL around mouth for 30 seconds, then spit out. Use twice daily as directed. , Disp: 473 mL, Rfl: 0  •  cholecalciferol (VITAMIN D3) 25 MCG (1000 UT) tablet, Take 1,000 Units by mouth Daily., Disp: , Rfl:   •   clonazePAM (KlonoPIN) 0.5 MG tablet, Take 1 tablet by mouth At Night As Needed, Disp: 30 tablet, Rfl: 1  •  Dextromethorphan-guaiFENesin (Mucus-DM)  MG tablet sustained-release 12 hour, Take  by mouth., Disp: , Rfl:   •  dicyclomine (BENTYL) 10 MG capsule, Take 1 capsule by mouth 3 (Three) Times a Day., Disp: 270 capsule, Rfl: 3  •  diphenhydrAMINE (Benadryl Allergy) 25 MG tablet, Take 1-2 tablets by mouth Every 4-6 Hours As Needed., Disp: 90 tablet, Rfl: 11  •  EPINEPHrine (EPIPEN) 0.3 MG/0.3ML solution auto-injector injection, Use as directed for acute allergic reaction, Disp: 2 each, Rfl: 3  •  famotidine (PEPCID) 20 MG tablet, Take 20 mg by mouth 2 (Two) Times a Day., Disp: , Rfl:   •  ferrous sulfate 325 (65 Fe) MG tablet, Take  by mouth., Disp: , Rfl:   •  fexofenadine (ALLEGRA) 180 MG tablet, Take 180 mg by mouth Daily., Disp: , Rfl:   •  fluticasone (FLONASE) 50 MCG/ACT nasal spray, Instill 2 sprays into each nostril daily., Disp: 16 g, Rfl: 11  •  fluticasone (FLOVENT DISKUS) 50 MCG/BLIST diskus inhaler, Inhale 1 puff 2 (Two) Times a Day., Disp: , Rfl:   •  fluticasone-salmeterol (Advair HFA) 230-21 MCG/ACT inhaler, Inhale 2 puffs by mouth 2 (Two) Times a Day., Disp: 12 g, Rfl: 11  •  fluticasone-salmeterol (ADVAIR) 500-50 MCG/DOSE DISKUS, Inhale 1 puff., Disp: , Rfl:   •  folic acid (FOLVITE) 1 MG tablet, Take 1 tablet by mouth Daily., Disp: 90 tablet, Rfl: 3  •  furosemide (LASIX) 40 MG tablet, Take 1 tablet by mouth Daily., Disp: 90 tablet, Rfl: 3  •  ipratropium (ATROVENT) 0.06 % nasal spray, Spray 2 sprays into the nostril(s) every night at bedtime as directed by provider, Disp: 15 mL, Rfl: 12  •  ipratropium-albuterol (DUO-NEB) 0.5-2.5 mg/3 ml nebulizer, Inhale contents of 1 vial (3mL) via nebulizer every 4-6 hours as needed for coughing, wheezing, or shortness of breath., Disp: 300 mL, Rfl: 3  •  isosorbide dinitrate (ISORDIL) 10 MG tablet, Take 1 tablet by mouth 3 (Three) Times a Day., Disp:  270 tablet, Rfl: 3  •  levothyroxine (SYNTHROID, LEVOTHROID) 50 MCG tablet, Take 1 tablet by mouth Daily., Disp: 90 tablet, Rfl: 3  •  Magnesium 250 MG tablet, Take 500 mg by mouth Daily. Two tablets, Disp: , Rfl:   •  melatonin 5 MG tablet tablet, Take 5 mg by mouth Every Night., Disp: , Rfl:   •  methotrexate 2.5 MG tablet, Take 2 tablets by mouth 1 (One) Time Per Week., Disp: 8 tablet, Rfl: 5  •  montelukast (SINGULAIR) 10 MG tablet, Take 10 mg by mouth Every Night., Disp: , Rfl:   •  multivitamin with minerals (ONE-A-DAY PROACTIVE 65+ PO), Take 1 tablet by mouth Daily., Disp: , Rfl:   •  omeprazole (priLOSEC) 40 MG capsule, Take 1 capsule by mouth 2 (two) times a day., Disp: 180 capsule, Rfl: 3  •  ondansetron (ZOFRAN) 4 MG tablet, Take 1 tablet by mouth Every 8 (Eight) Hours As Needed for Nausea or Vomiting., Disp: 30 tablet, Rfl: 11  •  potassium citrate (UROCIT-K) 10 MEQ (1080 MG) CR tablet, Take 1 tablet by mouth Daily., Disp: 90 tablet, Rfl: 3  •  predniSONE (DELTASONE) 5 MG tablet, Take 1 tablet by mouth Daily., Disp: 30 tablet, Rfl: 1  •  promethazine-dextromethorphan (PROMETHAZINE-DM) 6.25-15 MG/5ML syrup, Take 5 mL by mouth 4 (Four) Times a Day As Needed for Cough., Disp: 240 mL, Rfl: 0  •  Spacer/Aero-Holding Chambers (AeroChamber Plus Wali-Vu) misc, Use as directed with albuterol inhaler, Disp: 1 each, Rfl: 0  •  sucralfate (Carafate) 1 g tablet, Take 1 tablet by mouth 4 (Four) Times a Day., Disp: 120 tablet, Rfl: 1  •  tiotropium bromide monohydrate (SPIRIVA RESPIMAT) 2.5 MCG/ACT aerosol solution inhaler, Inhale 2 puffs by mouth daily, Disp: 4 g, Rfl: 11  •  tiZANidine (ZANAFLEX) 4 MG tablet, Take 1 tablet by mouth Every Night., Disp: 90 tablet, Rfl: 1  •  ubrogepant (ubrogepant) 100 MG tablet, Take 1 tablet by mouth once per day as needed at onset of migraine, Disp: 10 tablet, Rfl: 11  •  valsartan (DIOVAN) 160 MG tablet, Take 1 tablet by mouth Daily., Disp: 90 tablet, Rfl: 3  •  vitamin B-12  "(CYANOCOBALAMIN) 1000 MCG tablet, Take 1,000 mcg by mouth 2 (two) times a day., Disp: , Rfl:   •  doxycycline (MONODOX) 100 MG capsule, Take 1 capsule by mouth Every 12 (Twelve) Hours., Disp: 20 capsule, Rfl: 0  •  methylPREDNISolone (MEDROL) 4 MG dose pack, Take as directed on package instructions., Disp: 21 each, Rfl: 0    Current Facility-Administered Medications:   •  heparin injection 500 Units, 5 mL, Intravenous, PRN, Sanjeev Rawls MD  •  sodium chloride 0.9 % flush 10 mL, 10 mL, Intravenous, Q12H, Sanjeev Rawls MD  •  sodium chloride 0.9 % flush 10 mL, 10 mL, Intravenous, PRN, Sanjeev Rawls MD  •  sodium chloride 0.9 % flush 20 mL, 20 mL, Intravenous, PRN, Sanjeev Rawls MD    Objective      Blood pressure (!) 145/106, pulse 90, temperature 97.8 °F (36.6 °C), temperature source Temporal, resp. rate 16, height 165.1 cm (65\"), weight 115 kg (253 lb), SpO2 98 %, not currently breastfeeding.    Physical Exam     General Appearance:    Alert, cooperative, no distress, appears stated age   Head:    Normocephalic, without obvious abnormality, atraumatic   Eyes:    PERRL, conjunctiva/corneas clear, EOM's intact   Ears:    Normal TM's and external ear canals, both ears   Nose:   Nares normal, septum midline, mucosa normal, no drainage   or sinus tenderness   Throat:   Lips, mucosa, and tongue normal; teeth and gums normal   Neck:   Supple, symmetrical, trachea midline, no adenopathy;        thyroid:  No enlargement/tenderness/nodules; no carotid    bruit or JVD   Back:     Symmetric, no curvature, ROM normal, no CVA tenderness   Lungs:     Clear to auscultation bilaterally, respirations unlabored   Chest wall:    No tenderness or deformity   Heart:    Regular rate and rhythm, S1 and S2 normal, no murmur,        rub or gallop   Abdomen:     Soft, non-tender, bowel sounds active all four quadrants,     no masses, no organomegaly   Extremities:   Extremities normal, atraumatic, no cyanosis or edema "   Pulses:   2+ and symmetric all extremities   Skin:   Skin color, texture, turgor normal, no rashes or lesions   Lymph nodes:   Cervical, supraclavicular, and axillary nodes normal   Neurologic:   CNII-XII intact. Normal strength, sensation and reflexes       throughout      Results for orders placed or performed in visit on 07/09/21   Basic metabolic panel    Specimen: Blood   Result Value Ref Range    Glucose 152 (H) 65 - 99 mg/dL    BUN 8 8 - 23 mg/dL    Creatinine 1.21 (H) 0.57 - 1.00 mg/dL    Sodium 131 (L) 136 - 145 mmol/L    Potassium 4.1 3.5 - 5.2 mmol/L    Chloride 96 (L) 98 - 107 mmol/L    CO2 22.9 22.0 - 29.0 mmol/L    Calcium 8.9 8.6 - 10.5 mg/dL    eGFR Non African Amer 45 (L) >60 mL/min/1.73    BUN/Creatinine Ratio 6.6 (L) 7.0 - 25.0    Anion Gap 12.1 5.0 - 15.0 mmol/L         Assessment/Plan       Diagnoses and all orders for this visit:    1. Moderate persistent asthma with exacerbation (Primary)  -     COVID-19 PCR, [facility], NP SWAB IN [facility]SP[facility] MEDIA/ORAL SWISH 24-30 HR TAT - Swab, Oropharynx    Other orders  -     doxycycline (MONODOX) 100 MG capsule; Take 1 capsule by mouth Every 12 (Twelve) Hours.  Dispense: 20 capsule; Refill: 0  -     methylPREDNISolone (MEDROL) 4 MG dose pack; Take as directed on package instructions.  Dispense: 21 each; Refill: 0      No follow-ups on file.          There are no Patient Instructions on file for this visit.     Sanjeev Rawls MD    Assessment/Plan

## 2021-07-30 LAB — SARS-COV-2 RNA NOSE QL NAA+PROBE: NOT DETECTED

## 2021-08-09 ENCOUNTER — HOSPITAL ENCOUNTER (OUTPATIENT)
Dept: INFUSION THERAPY | Facility: HOSPITAL | Age: 66
Setting detail: INFUSION SERIES
Discharge: HOME | End: 2021-08-09

## 2021-08-09 VITALS
SYSTOLIC BLOOD PRESSURE: 160 MMHG | WEIGHT: 248 LBS | BODY MASS INDEX: 41.27 KG/M2 | TEMPERATURE: 98.4 F | HEART RATE: 82 BPM | RESPIRATION RATE: 18 BRPM | OXYGEN SATURATION: 95 % | DIASTOLIC BLOOD PRESSURE: 86 MMHG

## 2021-08-09 DIAGNOSIS — Z95.828 PORT-A-CATH IN PLACE: ICD-10-CM

## 2021-08-09 DIAGNOSIS — D80.1 COMMON VARIABLE AGAMMAGLOBULINEMIA (HCC): Primary | ICD-10-CM

## 2021-08-09 PROCEDURE — 96365 THER/PROPH/DIAG IV INF INIT: CPT

## 2021-08-09 PROCEDURE — 25010000002 IMMUNE GLOBULIN (HUMAN) 20 GM/200ML SOLUTION: Performed by: ALLERGY & IMMUNOLOGY

## 2021-08-09 PROCEDURE — 96366 THER/PROPH/DIAG IV INF ADDON: CPT

## 2021-08-09 PROCEDURE — 25010000002 HEPARIN LOCK FLUSH PER 10 UNITS: Performed by: INTERNAL MEDICINE

## 2021-08-09 RX ORDER — DIPHENHYDRAMINE HCL 25 MG
50 CAPSULE ORAL ONCE
Status: DISCONTINUED | OUTPATIENT
Start: 2021-08-09 | End: 2021-08-11 | Stop reason: HOSPADM

## 2021-08-09 RX ORDER — HEPARIN SODIUM (PORCINE) LOCK FLUSH IV SOLN 100 UNIT/ML 100 UNIT/ML
500 SOLUTION INTRAVENOUS AS NEEDED
Status: CANCELLED | OUTPATIENT
Start: 2021-08-09

## 2021-08-09 RX ORDER — HEPARIN SODIUM (PORCINE) LOCK FLUSH IV SOLN 100 UNIT/ML 100 UNIT/ML
500 SOLUTION INTRAVENOUS AS NEEDED
Status: DISCONTINUED | OUTPATIENT
Start: 2021-08-09 | End: 2021-08-11 | Stop reason: HOSPADM

## 2021-08-09 RX ORDER — DIPHENHYDRAMINE HCL 25 MG
50 CAPSULE ORAL ONCE AS NEEDED
Status: DISCONTINUED | OUTPATIENT
Start: 2021-08-09 | End: 2021-08-11 | Stop reason: HOSPADM

## 2021-08-09 RX ORDER — PREDNISONE 20 MG/1
40 TABLET ORAL ONCE
Status: DISCONTINUED | OUTPATIENT
Start: 2021-08-09 | End: 2021-08-11 | Stop reason: HOSPADM

## 2021-08-09 RX ORDER — ACETAMINOPHEN 325 MG/1
325 TABLET ORAL ONCE
Status: DISCONTINUED | OUTPATIENT
Start: 2021-08-09 | End: 2021-08-11 | Stop reason: HOSPADM

## 2021-08-09 RX ORDER — PREDNISONE 20 MG/1
40 TABLET ORAL ONCE AS NEEDED
Status: CANCELLED
Start: 2021-09-06

## 2021-08-09 RX ORDER — METHYLPREDNISOLONE SODIUM SUCCINATE 125 MG/2ML
50 INJECTION, POWDER, LYOPHILIZED, FOR SOLUTION INTRAMUSCULAR; INTRAVENOUS ONCE AS NEEDED
Status: DISCONTINUED | OUTPATIENT
Start: 2021-08-09 | End: 2021-08-11 | Stop reason: HOSPADM

## 2021-08-09 RX ORDER — EPINEPHRINE 1 MG/ML
0.3 INJECTION, SOLUTION INTRAMUSCULAR; SUBCUTANEOUS ONCE AS NEEDED
Status: CANCELLED
Start: 2021-09-06

## 2021-08-09 RX ORDER — METHYLPREDNISOLONE SODIUM SUCCINATE 125 MG/2ML
50 INJECTION, POWDER, LYOPHILIZED, FOR SOLUTION INTRAMUSCULAR; INTRAVENOUS ONCE AS NEEDED
Status: CANCELLED
Start: 2021-09-06

## 2021-08-09 RX ORDER — PREDNISONE 20 MG/1
40 TABLET ORAL ONCE AS NEEDED
Status: DISCONTINUED | OUTPATIENT
Start: 2021-08-09 | End: 2021-08-11 | Stop reason: HOSPADM

## 2021-08-09 RX ORDER — DIPHENHYDRAMINE HCL 25 MG
50 CAPSULE ORAL ONCE AS NEEDED
Status: CANCELLED
Start: 2021-09-06

## 2021-08-09 RX ORDER — PREDNISONE 20 MG/1
40 TABLET ORAL ONCE
Status: CANCELLED
Start: 2021-09-06 | End: 2021-09-06

## 2021-08-09 RX ORDER — DIPHENHYDRAMINE HCL 25 MG
50 CAPSULE ORAL ONCE
Status: CANCELLED
Start: 2021-09-06 | End: 2021-09-06

## 2021-08-09 RX ORDER — SODIUM CHLORIDE 0.9 % (FLUSH) 0.9 %
10 SYRINGE (ML) INJECTION AS NEEDED
Status: DISCONTINUED | OUTPATIENT
Start: 2021-08-09 | End: 2021-08-11 | Stop reason: HOSPADM

## 2021-08-09 RX ORDER — ACETAMINOPHEN 325 MG/1
325 TABLET ORAL ONCE
Status: CANCELLED | OUTPATIENT
Start: 2021-09-06

## 2021-08-09 RX ORDER — EPINEPHRINE 1 MG/ML
0.3 INJECTION, SOLUTION INTRAMUSCULAR; SUBCUTANEOUS ONCE AS NEEDED
Status: DISCONTINUED | OUTPATIENT
Start: 2021-08-09 | End: 2021-08-11 | Stop reason: HOSPADM

## 2021-08-09 RX ORDER — SODIUM CHLORIDE 0.9 % (FLUSH) 0.9 %
10 SYRINGE (ML) INJECTION AS NEEDED
Status: CANCELLED | OUTPATIENT
Start: 2021-08-09

## 2021-08-09 RX ADMIN — HEPARIN 500 UNITS: 100 SYRINGE at 16:57

## 2021-08-09 RX ADMIN — IMMUNE GLOBULIN INFUSION (HUMAN) 20 G: 100 INJECTION, SOLUTION INTRAVENOUS; SUBCUTANEOUS at 10:42

## 2021-08-09 RX ADMIN — IMMUNE GLOBULIN INFUSION (HUMAN) 20 G: 100 INJECTION, SOLUTION INTRAVENOUS; SUBCUTANEOUS at 13:46

## 2021-08-09 RX ADMIN — SODIUM CHLORIDE, PRESERVATIVE FREE 10 ML: 5 INJECTION INTRAVENOUS at 16:57

## 2021-08-10 DIAGNOSIS — F41.9 ANXIETY: ICD-10-CM

## 2021-08-11 NOTE — TELEPHONE ENCOUNTER
Rx Refill Note  Requested Prescriptions     Pending Prescriptions Disp Refills   • clonazePAM (KlonoPIN) 0.5 MG tablet 30 tablet 1     Sig: Take 1 tablet by mouth At Night As Needed      Last office visit with prescribing clinician: 7/29/2021      Next office visit with prescribing clinician: 9/21/2021            Winter Philip MA  08/11/21, 07:46 EDT

## 2021-08-16 RX ORDER — CLONAZEPAM 0.5 MG/1
0.5 TABLET ORAL NIGHTLY PRN
Qty: 30 TABLET | Refills: 1 | Status: SHIPPED | OUTPATIENT
Start: 2021-08-16 | End: 2021-12-07 | Stop reason: SDUPTHER

## 2021-08-25 RX ORDER — SUCRALFATE 1 G/1
1 TABLET ORAL 4 TIMES DAILY
Qty: 120 TABLET | Refills: 1 | Status: SHIPPED | OUTPATIENT
Start: 2021-08-25 | End: 2022-08-10 | Stop reason: ALTCHOICE

## 2021-08-30 ENCOUNTER — TELEPHONE (OUTPATIENT)
Dept: INTERNAL MEDICINE | Facility: CLINIC | Age: 66
End: 2021-08-30

## 2021-09-07 ENCOUNTER — APPOINTMENT (OUTPATIENT)
Dept: INFUSION THERAPY | Facility: HOSPITAL | Age: 66
End: 2021-09-07

## 2021-09-13 ENCOUNTER — HOSPITAL ENCOUNTER (OUTPATIENT)
Dept: INFUSION THERAPY | Facility: HOSPITAL | Age: 66
Setting detail: INFUSION SERIES
Discharge: HOME | End: 2021-09-13

## 2021-09-13 VITALS
RESPIRATION RATE: 18 BRPM | BODY MASS INDEX: 40.94 KG/M2 | TEMPERATURE: 98.2 F | WEIGHT: 246 LBS | HEART RATE: 88 BPM | OXYGEN SATURATION: 94 % | SYSTOLIC BLOOD PRESSURE: 166 MMHG | DIASTOLIC BLOOD PRESSURE: 89 MMHG

## 2021-09-13 DIAGNOSIS — D83.9 COMMON VARIABLE IMMUNODEFICIENCY (HCC): ICD-10-CM

## 2021-09-13 DIAGNOSIS — D80.1 COMMON VARIABLE AGAMMAGLOBULINEMIA (HCC): ICD-10-CM

## 2021-09-13 DIAGNOSIS — Z95.828 PORT-A-CATH IN PLACE: Primary | ICD-10-CM

## 2021-09-13 LAB
ALBUMIN SERPL-MCNC: 4.3 G/DL (ref 3.5–5.2)
ALBUMIN/GLOB SERPL: 1.7 G/DL
ALP SERPL-CCNC: 76 U/L (ref 39–117)
ALT SERPL W P-5'-P-CCNC: 16 U/L (ref 1–33)
ANION GAP SERPL CALCULATED.3IONS-SCNC: 12.3 MMOL/L (ref 5–15)
AST SERPL-CCNC: 23 U/L (ref 1–32)
BASOPHILS # BLD AUTO: 0.06 10*3/MM3 (ref 0–0.2)
BASOPHILS NFR BLD AUTO: 1.2 % (ref 0–1.5)
BILIRUB SERPL-MCNC: 0.3 MG/DL (ref 0–1.2)
BUN SERPL-MCNC: 13 MG/DL (ref 8–23)
BUN/CREAT SERPL: 9.6 (ref 7–25)
CALCIUM SPEC-SCNC: 9.1 MG/DL (ref 8.6–10.5)
CHLORIDE SERPL-SCNC: 96 MMOL/L (ref 98–107)
CO2 SERPL-SCNC: 24.7 MMOL/L (ref 22–29)
CREAT SERPL-MCNC: 1.35 MG/DL (ref 0.57–1)
DEPRECATED RDW RBC AUTO: 47.3 FL (ref 37–54)
EOSINOPHIL # BLD AUTO: 0.17 10*3/MM3 (ref 0–0.4)
EOSINOPHIL NFR BLD AUTO: 3.4 % (ref 0.3–6.2)
ERYTHROCYTE [DISTWIDTH] IN BLOOD BY AUTOMATED COUNT: 13.6 % (ref 12.3–15.4)
GFR SERPL CREATININE-BSD FRML MDRD: 39 ML/MIN/1.73
GLOBULIN UR ELPH-MCNC: 2.6 GM/DL
GLUCOSE SERPL-MCNC: 174 MG/DL (ref 65–99)
HCT VFR BLD AUTO: 35.4 % (ref 34–46.6)
HGB BLD-MCNC: 12.2 G/DL (ref 12–15.9)
IGG1 SER-MCNC: 903 MG/DL (ref 700–1600)
IMM GRANULOCYTES # BLD AUTO: 0.03 10*3/MM3 (ref 0–0.05)
IMM GRANULOCYTES NFR BLD AUTO: 0.6 % (ref 0–0.5)
LYMPHOCYTES # BLD AUTO: 0.63 10*3/MM3 (ref 0.7–3.1)
LYMPHOCYTES NFR BLD AUTO: 12.8 % (ref 19.6–45.3)
MCH RBC QN AUTO: 32.7 PG (ref 26.6–33)
MCHC RBC AUTO-ENTMCNC: 34.5 G/DL (ref 31.5–35.7)
MCV RBC AUTO: 94.9 FL (ref 79–97)
MONOCYTES # BLD AUTO: 0.21 10*3/MM3 (ref 0.1–0.9)
MONOCYTES NFR BLD AUTO: 4.3 % (ref 5–12)
NEUTROPHILS NFR BLD AUTO: 3.83 10*3/MM3 (ref 1.7–7)
NEUTROPHILS NFR BLD AUTO: 77.7 % (ref 42.7–76)
NRBC BLD AUTO-RTO: 0 /100 WBC (ref 0–0.2)
PLATELET # BLD AUTO: 249 10*3/MM3 (ref 140–450)
PMV BLD AUTO: 8.9 FL (ref 6–12)
POTASSIUM SERPL-SCNC: 4.2 MMOL/L (ref 3.5–5.2)
PROT SERPL-MCNC: 6.9 G/DL (ref 6–8.5)
RBC # BLD AUTO: 3.73 10*6/MM3 (ref 3.77–5.28)
SODIUM SERPL-SCNC: 133 MMOL/L (ref 136–145)
WBC # BLD AUTO: 4.93 10*3/MM3 (ref 3.4–10.8)

## 2021-09-13 PROCEDURE — 85025 COMPLETE CBC W/AUTO DIFF WBC: CPT | Performed by: ALLERGY & IMMUNOLOGY

## 2021-09-13 PROCEDURE — 25010000002 IMMUNE GLOBULIN (HUMAN) 20 GM/200ML SOLUTION: Performed by: ALLERGY & IMMUNOLOGY

## 2021-09-13 PROCEDURE — 25010000002 HEPARIN LOCK FLUSH PER 10 UNITS: Performed by: INTERNAL MEDICINE

## 2021-09-13 PROCEDURE — 96365 THER/PROPH/DIAG IV INF INIT: CPT

## 2021-09-13 PROCEDURE — 80053 COMPREHEN METABOLIC PANEL: CPT | Performed by: ALLERGY & IMMUNOLOGY

## 2021-09-13 PROCEDURE — 82784 ASSAY IGA/IGD/IGG/IGM EACH: CPT | Performed by: ALLERGY & IMMUNOLOGY

## 2021-09-13 PROCEDURE — 96366 THER/PROPH/DIAG IV INF ADDON: CPT

## 2021-09-13 RX ORDER — EPINEPHRINE 1 MG/ML
0.3 INJECTION, SOLUTION INTRAMUSCULAR; SUBCUTANEOUS ONCE AS NEEDED
Status: CANCELLED
Start: 2021-10-04

## 2021-09-13 RX ORDER — SODIUM CHLORIDE 0.9 % (FLUSH) 0.9 %
10 SYRINGE (ML) INJECTION AS NEEDED
Status: DISCONTINUED | OUTPATIENT
Start: 2021-09-13 | End: 2021-09-15 | Stop reason: HOSPADM

## 2021-09-13 RX ORDER — PREDNISONE 20 MG/1
40 TABLET ORAL ONCE AS NEEDED
Status: CANCELLED
Start: 2021-10-04

## 2021-09-13 RX ORDER — ACETAMINOPHEN 325 MG/1
325 TABLET ORAL ONCE
Status: CANCELLED | OUTPATIENT
Start: 2021-10-04

## 2021-09-13 RX ORDER — EPINEPHRINE 1 MG/ML
0.3 INJECTION, SOLUTION INTRAMUSCULAR; SUBCUTANEOUS ONCE AS NEEDED
Status: DISCONTINUED | OUTPATIENT
Start: 2021-09-13 | End: 2021-09-15 | Stop reason: HOSPADM

## 2021-09-13 RX ORDER — SODIUM CHLORIDE 0.9 % (FLUSH) 0.9 %
10 SYRINGE (ML) INJECTION AS NEEDED
Status: CANCELLED | OUTPATIENT
Start: 2021-09-13

## 2021-09-13 RX ORDER — DIPHENHYDRAMINE HCL 25 MG
50 CAPSULE ORAL ONCE
Status: DISCONTINUED | OUTPATIENT
Start: 2021-09-13 | End: 2021-09-15 | Stop reason: HOSPADM

## 2021-09-13 RX ORDER — HEPARIN SODIUM (PORCINE) LOCK FLUSH IV SOLN 100 UNIT/ML 100 UNIT/ML
500 SOLUTION INTRAVENOUS AS NEEDED
Status: DISCONTINUED | OUTPATIENT
Start: 2021-09-13 | End: 2021-09-15 | Stop reason: HOSPADM

## 2021-09-13 RX ORDER — METHYLPREDNISOLONE SODIUM SUCCINATE 125 MG/2ML
50 INJECTION, POWDER, LYOPHILIZED, FOR SOLUTION INTRAMUSCULAR; INTRAVENOUS ONCE AS NEEDED
Status: CANCELLED
Start: 2021-10-04

## 2021-09-13 RX ORDER — METHYLPREDNISOLONE SODIUM SUCCINATE 125 MG/2ML
50 INJECTION, POWDER, LYOPHILIZED, FOR SOLUTION INTRAMUSCULAR; INTRAVENOUS ONCE AS NEEDED
Status: DISCONTINUED | OUTPATIENT
Start: 2021-09-13 | End: 2021-09-15 | Stop reason: HOSPADM

## 2021-09-13 RX ORDER — HEPARIN SODIUM (PORCINE) LOCK FLUSH IV SOLN 100 UNIT/ML 100 UNIT/ML
500 SOLUTION INTRAVENOUS AS NEEDED
Status: CANCELLED | OUTPATIENT
Start: 2021-09-13

## 2021-09-13 RX ORDER — PREDNISONE 20 MG/1
40 TABLET ORAL ONCE
Status: CANCELLED
Start: 2021-10-04 | End: 2021-10-04

## 2021-09-13 RX ORDER — DIPHENHYDRAMINE HCL 25 MG
50 CAPSULE ORAL ONCE AS NEEDED
Status: DISCONTINUED | OUTPATIENT
Start: 2021-09-13 | End: 2021-09-15 | Stop reason: HOSPADM

## 2021-09-13 RX ORDER — DIPHENHYDRAMINE HCL 25 MG
50 CAPSULE ORAL ONCE AS NEEDED
Status: CANCELLED
Start: 2021-10-04

## 2021-09-13 RX ORDER — PREDNISONE 20 MG/1
40 TABLET ORAL ONCE
Status: DISCONTINUED | OUTPATIENT
Start: 2021-09-13 | End: 2021-09-15 | Stop reason: HOSPADM

## 2021-09-13 RX ORDER — PREDNISONE 20 MG/1
40 TABLET ORAL ONCE AS NEEDED
Status: DISCONTINUED | OUTPATIENT
Start: 2021-09-13 | End: 2021-09-15 | Stop reason: HOSPADM

## 2021-09-13 RX ORDER — ACETAMINOPHEN 325 MG/1
325 TABLET ORAL ONCE
Status: DISCONTINUED | OUTPATIENT
Start: 2021-09-13 | End: 2021-09-15 | Stop reason: HOSPADM

## 2021-09-13 RX ORDER — DIPHENHYDRAMINE HCL 25 MG
50 CAPSULE ORAL ONCE
Status: CANCELLED
Start: 2021-10-04 | End: 2021-10-04

## 2021-09-13 RX ADMIN — HEPARIN 500 UNITS: 100 SYRINGE at 15:27

## 2021-09-13 RX ADMIN — IMMUNE GLOBULIN INFUSION (HUMAN) 20 G: 100 INJECTION, SOLUTION INTRAVENOUS; SUBCUTANEOUS at 12:04

## 2021-09-13 RX ADMIN — IMMUNE GLOBULIN INFUSION (HUMAN) 20 G: 100 INJECTION, SOLUTION INTRAVENOUS; SUBCUTANEOUS at 09:08

## 2021-09-21 ENCOUNTER — HOSPITAL ENCOUNTER (OUTPATIENT)
Dept: GENERAL RADIOLOGY | Facility: HOSPITAL | Age: 66
Discharge: HOME | End: 2021-09-21
Admitting: INTERNAL MEDICINE

## 2021-09-21 ENCOUNTER — OFFICE VISIT (OUTPATIENT)
Dept: INTERNAL MEDICINE | Facility: CLINIC | Age: 66
End: 2021-09-21

## 2021-09-21 VITALS
HEIGHT: 65 IN | RESPIRATION RATE: 16 BRPM | OXYGEN SATURATION: 96 % | HEART RATE: 82 BPM | SYSTOLIC BLOOD PRESSURE: 142 MMHG | BODY MASS INDEX: 41.48 KG/M2 | WEIGHT: 249 LBS | TEMPERATURE: 98.6 F | DIASTOLIC BLOOD PRESSURE: 88 MMHG

## 2021-09-21 DIAGNOSIS — M54.50 CHRONIC BILATERAL LOW BACK PAIN WITHOUT SCIATICA: ICD-10-CM

## 2021-09-21 DIAGNOSIS — Z78.0 POSTMENOPAUSAL: ICD-10-CM

## 2021-09-21 DIAGNOSIS — N18.30 CHRONIC RENAL IMPAIRMENT, STAGE 3 (MODERATE), UNSPECIFIED WHETHER STAGE 3A OR 3B CKD (HCC): ICD-10-CM

## 2021-09-21 DIAGNOSIS — G89.29 CHRONIC RIGHT SHOULDER PAIN: Primary | ICD-10-CM

## 2021-09-21 DIAGNOSIS — A04.8 H. PYLORI INFECTION: ICD-10-CM

## 2021-09-21 DIAGNOSIS — G89.29 CHRONIC BILATERAL LOW BACK PAIN WITHOUT SCIATICA: ICD-10-CM

## 2021-09-21 DIAGNOSIS — Z12.31 BREAST CANCER SCREENING BY MAMMOGRAM: ICD-10-CM

## 2021-09-21 DIAGNOSIS — M25.511 CHRONIC RIGHT SHOULDER PAIN: ICD-10-CM

## 2021-09-21 DIAGNOSIS — G89.29 CHRONIC RIGHT SHOULDER PAIN: ICD-10-CM

## 2021-09-21 DIAGNOSIS — M25.511 CHRONIC RIGHT SHOULDER PAIN: Primary | ICD-10-CM

## 2021-09-21 DIAGNOSIS — G72.9 POLYMYOPATHY: ICD-10-CM

## 2021-09-21 PROCEDURE — 73030 X-RAY EXAM OF SHOULDER: CPT

## 2021-09-21 PROCEDURE — 99214 OFFICE O/P EST MOD 30 MIN: CPT | Performed by: INTERNAL MEDICINE

## 2021-09-21 RX ORDER — CLARITHROMYCIN 500 MG/1
500 TABLET, COATED ORAL EVERY 12 HOURS SCHEDULED
Qty: 28 TABLET | Refills: 0 | Status: SHIPPED | OUTPATIENT
Start: 2021-09-21 | End: 2021-10-07

## 2021-09-21 RX ORDER — DOXYCYCLINE HYCLATE 100 MG/1
100 CAPSULE ORAL 2 TIMES DAILY
Qty: 28 CAPSULE | Refills: 0 | Status: SHIPPED | OUTPATIENT
Start: 2021-09-21 | End: 2021-10-07

## 2021-09-21 RX ORDER — ACETAMINOPHEN AND CODEINE PHOSPHATE 300; 30 MG/1; MG/1
1 TABLET ORAL EVERY 4 HOURS PRN
Qty: 10 TABLET | Refills: 0 | Status: SHIPPED | OUTPATIENT
Start: 2021-09-21 | End: 2022-07-14 | Stop reason: SDUPTHER

## 2021-09-21 RX ORDER — METRONIDAZOLE 500 MG/1
500 TABLET ORAL 3 TIMES DAILY
Qty: 42 TABLET | Refills: 0 | Status: SHIPPED | OUTPATIENT
Start: 2021-09-21 | End: 2021-11-10

## 2021-09-21 NOTE — PROGRESS NOTES
Subjective     Patient ID: Raquel Mariee is a 65 y.o. female. Patient is here for management of multiple medical problems.     Chief Complaint   Patient presents with   • Asthma     History of Present Illness       Asthma      barry  On cpap.   Wants off cpap due to cost.    Seen Dr Kayla fermin with [facility].  Want treated for h pylori  carafate helps.      The following portions of the patient's history were reviewed and updated as appropriate: allergies, current medications, past family history, past medical history, past social history, past surgical history and problem list.    Review of Systems   Constitutional: Negative for fatigue.   HENT: Negative for drooling, ear discharge, sinus pressure and sneezing.    Gastrointestinal: Negative for anal bleeding, blood in stool and constipation.   Psychiatric/Behavioral: Negative for self-injury and sleep disturbance. The patient is not nervous/anxious.    All other systems reviewed and are negative.      Current Outpatient Medications:   •  albuterol sulfate  (90 Base) MCG/ACT inhaler, Inhale 2 puffs by mouth every 4-6 hours as needed for cough, wheeze, shortness of breath. Use with vortex spacer, Disp: 8.5 g, Rfl: 3  •  allopurinol (Zyloprim) 100 MG tablet, Take 1 tablet by mouth Daily., Disp: 90 tablet, Rfl: 3  •  amitriptyline (ELAVIL) 10 MG tablet, Take 2 tablets by mouth Every Evening., Disp: 90 tablet, Rfl: 3  •  Azelastine HCl 137 MCG/SPRAY solution, Instill 1-2 sprays in each nostril twice a day as needed, Disp: 30 mL, Rfl: 11  •  betamethasone valerate (VALISONE) 0.1 % cream, Apply topically to the appropriate area as directed Daily., Disp: 45 g, Rfl: 11  •  buPROPion SR (WELLBUTRIN SR) 150 MG 12 hr tablet, Take 1 tablet by mouth 2 (two) times a day., Disp: 180 tablet, Rfl: 3  •  calcium carbonate (Antacid Maximum) 1000 MG chewable tablet, Chew 500 mg 3 (Three) Times a Day., Disp: , Rfl:   •  CBD (cannabidiol) oral oil, Take  by mouth 2  (Two) Times a Day., Disp: , Rfl:   •  chlorhexidine (PERIDEX) 0.12 % solution, Swish 15 mL around mouth for 30 seconds, then spit out. Use twice daily as directed. , Disp: 473 mL, Rfl: 0  •  chlorhexidine (PERIDEX) 0.12 % solution, Rinse mouth with 15mL (1 capful) for 30 seconds in the morning and evening after brushing teeth. Spit out after rinsing. Do not swallow., Disp: 473 mL, Rfl: 0  •  cholecalciferol (VITAMIN D3) 25 MCG (1000 UT) tablet, Take 1,000 Units by mouth Daily., Disp: , Rfl:   •  clonazePAM (KlonoPIN) 0.5 MG tablet, Take 1 tablet by mouth At Night As Needed, Disp: 30 tablet, Rfl: 1  •  Dextromethorphan-guaiFENesin (Mucus-DM)  MG tablet sustained-release 12 hour, Take  by mouth., Disp: , Rfl:   •  dicyclomine (BENTYL) 10 MG capsule, Take 1 capsule by mouth 3 (Three) Times a Day., Disp: 270 capsule, Rfl: 3  •  diphenhydrAMINE (Benadryl Allergy) 25 MG tablet, Take 1-2 tablets by mouth Every 4-6 Hours As Needed., Disp: 90 tablet, Rfl: 11  •  doxycycline (MONODOX) 100 MG capsule, Take 1 capsule by mouth Every 12 (Twelve) Hours., Disp: 20 capsule, Rfl: 0  •  EPINEPHrine (EPIPEN) 0.3 MG/0.3ML solution auto-injector injection, Use as directed for acute allergic reaction, Disp: 2 each, Rfl: 3  •  EPINEPHrine (EPIPEN) 0.3 MG/0.3ML solution auto-injector injection, Use as directed for acute allergic reaction, Disp: 2 each, Rfl: 3  •  famotidine (PEPCID) 20 MG tablet, Take 20 mg by mouth 2 (Two) Times a Day., Disp: , Rfl:   •  ferrous sulfate 325 (65 Fe) MG tablet, Take  by mouth., Disp: , Rfl:   •  fexofenadine (ALLEGRA) 180 MG tablet, Take 180 mg by mouth Daily., Disp: , Rfl:   •  fluticasone (FLONASE) 50 MCG/ACT nasal spray, Instill 2 sprays into each nostril daily., Disp: 16 g, Rfl: 11  •  fluticasone (FLOVENT DISKUS) 50 MCG/BLIST diskus inhaler, Inhale 1 puff 2 (Two) Times a Day., Disp: , Rfl:   •  fluticasone-salmeterol (Advair HFA) 230-21 MCG/ACT inhaler, Inhale 2 puffs by mouth 2 (Two) Times a  Day., Disp: 12 g, Rfl: 11  •  fluticasone-salmeterol (ADVAIR) 500-50 MCG/DOSE DISKUS, Inhale 1 puff., Disp: , Rfl:   •  folic acid (FOLVITE) 1 MG tablet, Take 1 tablet by mouth Daily., Disp: 90 tablet, Rfl: 3  •  furosemide (LASIX) 40 MG tablet, Take 1 tablet by mouth Daily., Disp: 90 tablet, Rfl: 3  •  ipratropium (ATROVENT) 0.06 % nasal spray, Spray 2 sprays into the nostril(s) every night at bedtime as directed by provider, Disp: 15 mL, Rfl: 12  •  ipratropium-albuterol (DUO-NEB) 0.5-2.5 mg/3 ml nebulizer, Inhale contents of 1 vial (3mL) via nebulizer every 4-6 hours as needed for coughing, wheezing, or shortness of breath., Disp: 300 mL, Rfl: 3  •  isosorbide dinitrate (ISORDIL) 10 MG tablet, Take 1 tablet by mouth 3 (Three) Times a Day., Disp: 270 tablet, Rfl: 3  •  levothyroxine (SYNTHROID, LEVOTHROID) 50 MCG tablet, Take 1 tablet by mouth Daily., Disp: 90 tablet, Rfl: 3  •  Magnesium 250 MG tablet, Take 500 mg by mouth Daily. Two tablets, Disp: , Rfl:   •  melatonin 5 MG tablet tablet, Take 5 mg by mouth Every Night., Disp: , Rfl:   •  methotrexate 2.5 MG tablet, Take 2 tablets by mouth 1 (One) Time Per Week., Disp: 8 tablet, Rfl: 5  •  methylPREDNISolone (MEDROL) 4 MG dose pack, Take as directed on package instructions., Disp: 21 each, Rfl: 0  •  montelukast (SINGULAIR) 10 MG tablet, Take 10 mg by mouth Every Night., Disp: , Rfl:   •  montelukast (SINGULAIR) 10 MG tablet, Take 1 tablet by mouth every night at bedtime., Disp: 30 tablet, Rfl: 11  •  multivitamin with minerals (ONE-A-DAY PROACTIVE 65+ PO), Take 1 tablet by mouth Daily., Disp: , Rfl:   •  omeprazole (priLOSEC) 40 MG capsule, Take 1 capsule by mouth 2 (two) times a day., Disp: 180 capsule, Rfl: 3  •  ondansetron (ZOFRAN) 4 MG tablet, Take 1 tablet by mouth Every 8 (Eight) Hours As Needed for Nausea or Vomiting., Disp: 30 tablet, Rfl: 11  •  potassium citrate (UROCIT-K) 10 MEQ (1080 MG) CR tablet, Take 1 tablet by mouth Daily., Disp: 90 tablet,  Rfl: 3  •  predniSONE (DELTASONE) 20 MG tablet, Take 2 tablets by mouth 2 hours before gammagard infusion, Disp: 6 tablet, Rfl: 0  •  predniSONE (DELTASONE) 5 MG tablet, Take 1 tablet by mouth Daily., Disp: 30 tablet, Rfl: 1  •  promethazine-dextromethorphan (PROMETHAZINE-DM) 6.25-15 MG/5ML syrup, Take 5 mL by mouth 4 (Four) Times a Day As Needed for Cough., Disp: 240 mL, Rfl: 0  •  Spacer/Aero-Holding Chambers (AeroChamber Plus Wali-Vu) misc, Use as directed with albuterol inhaler, Disp: 1 each, Rfl: 0  •  sucralfate (Carafate) 1 g tablet, Take 1 tablet by mouth 4 (Four) Times a Day., Disp: 120 tablet, Rfl: 1  •  tiotropium bromide monohydrate (SPIRIVA RESPIMAT) 2.5 MCG/ACT aerosol solution inhaler, Inhale 2 puffs by mouth daily, Disp: 4 g, Rfl: 11  •  tiZANidine (ZANAFLEX) 4 MG tablet, Take 1 tablet by mouth Every Night., Disp: 90 tablet, Rfl: 1  •  triazolam (HALCION) 0.25 MG tablet, Bring to appointment to be administered for anxiolysis., Disp: 2 tablet, Rfl: 0  •  ubrogepant (ubrogepant) 100 MG tablet, Take 1 tablet by mouth once per day as needed at onset of migraine, Disp: 10 tablet, Rfl: 11  •  valsartan (DIOVAN) 160 MG tablet, Take 1 tablet by mouth Daily., Disp: 90 tablet, Rfl: 3  •  vitamin B-12 (CYANOCOBALAMIN) 1000 MCG tablet, Take 1,000 mcg by mouth 2 (two) times a day., Disp: , Rfl:   •  acetaminophen-codeine (TYLENOL #3) 300-30 MG per tablet, Take 1 tablet by mouth Every 4 (Four) Hours As Needed for Moderate Pain ., Disp: 10 tablet, Rfl: 0  •  clarithromycin (Biaxin) 500 MG tablet, Take 1 tablet by mouth Every 12 (Twelve) Hours., Disp: 28 tablet, Rfl: 0  •  doxycycline (VIBRAMYCIN) 100 MG capsule, Take 1 capsule by mouth 2 (Two) Times a Day., Disp: 28 capsule, Rfl: 0  •  metroNIDAZOLE (Flagyl) 500 MG tablet, Take 1 tablet by mouth 3 (Three) Times a Day for 14 days, Disp: 42 tablet, Rfl: 0    Current Facility-Administered Medications:   •  heparin injection 500 Units, 5 mL, Intravenous, PRN, Geile,  "Sanjeev DELAROSA MD  •  sodium chloride 0.9 % flush 10 mL, 10 mL, Intravenous, Q12H, Sanjeev Rawls MD  •  sodium chloride 0.9 % flush 10 mL, 10 mL, Intravenous, PRN, Sanjeev Rawls MD  •  sodium chloride 0.9 % flush 20 mL, 20 mL, Intravenous, PRN, Sanjeev Rawls MD    Objective      Blood pressure 142/88, pulse 82, temperature 98.6 °F (37 °C), resp. rate 16, height 165.1 cm (65\"), weight 113 kg (249 lb), SpO2 96 %, not currently breastfeeding.    Physical Exam     General Appearance:    Alert, cooperative, no distress, appears stated age   Head:    Normocephalic, without obvious abnormality, atraumatic   Eyes:    PERRL, conjunctiva/corneas clear, EOM's intact   Ears:    Normal TM's and external ear canals, both ears   Nose:   Nares normal, septum midline, mucosa normal, no drainage   or sinus tenderness   Throat:   Lips, mucosa, and tongue normal; teeth and gums normal   Neck:   Supple, symmetrical, trachea midline, no adenopathy;        thyroid:  No enlargement/tenderness/nodules; no carotid    bruit or JVD   Back:     Symmetric, no curvature, ROM normal, no CVA tenderness   Lungs:     Clear to auscultation bilaterally, respirations unlabored   Chest wall:    No tenderness or deformity   Heart:    Regular rate and rhythm, S1 and S2 normal, no murmur,        rub or gallop   Abdomen:     Soft, non-tender, bowel sounds active all four quadrants,     no masses, no organomegaly   Extremities:   Extremities normal, atraumatic, no cyanosis or edema   Pulses:   2+ and symmetric all extremities   Skin:   Skin color, texture, turgor normal, no rashes or lesions   Lymph nodes:   Cervical, supraclavicular, and axillary nodes normal   Neurologic:   CNII-XII intact. Normal strength, sensation and reflexes       throughout      Results for orders placed or performed during the hospital encounter of 09/13/21   IgG    Specimen: Blood   Result Value Ref Range    IgG 903 700-1,600 mg/dL   Comprehensive Metabolic Panel    " Specimen: Blood   Result Value Ref Range    Glucose 174 (H) 65 - 99 mg/dL    BUN 13 8 - 23 mg/dL    Creatinine 1.35 (H) 0.57 - 1.00 mg/dL    Sodium 133 (L) 136 - 145 mmol/L    Potassium 4.2 3.5 - 5.2 mmol/L    Chloride 96 (L) 98 - 107 mmol/L    CO2 24.7 22.0 - 29.0 mmol/L    Calcium 9.1 8.6 - 10.5 mg/dL    Total Protein 6.9 6.0 - 8.5 g/dL    Albumin 4.30 3.50 - 5.20 g/dL    ALT (SGPT) 16 1 - 33 U/L    AST (SGOT) 23 1 - 32 U/L    Alkaline Phosphatase 76 39 - 117 U/L    Total Bilirubin 0.3 0.0 - 1.2 mg/dL    eGFR Non African Amer 39 (L) >60 mL/min/1.73    Globulin 2.6 gm/dL    A/G Ratio 1.7 g/dL    BUN/Creatinine Ratio 9.6 7.0 - 25.0    Anion Gap 12.3 5.0 - 15.0 mmol/L   CBC Auto Differential    Specimen: Blood   Result Value Ref Range    WBC 4.93 3.40 - 10.80 10*3/mm3    RBC 3.73 (L) 3.77 - 5.28 10*6/mm3    Hemoglobin 12.2 12.0 - 15.9 g/dL    Hematocrit 35.4 34.0 - 46.6 %    MCV 94.9 79.0 - 97.0 fL    MCH 32.7 26.6 - 33.0 pg    MCHC 34.5 31.5 - 35.7 g/dL    RDW 13.6 12.3 - 15.4 %    RDW-SD 47.3 37.0 - 54.0 fl    MPV 8.9 6.0 - 12.0 fL    Platelets 249 140 - 450 10*3/mm3    Neutrophil % 77.7 (H) 42.7 - 76.0 %    Lymphocyte % 12.8 (L) 19.6 - 45.3 %    Monocyte % 4.3 (L) 5.0 - 12.0 %    Eosinophil % 3.4 0.3 - 6.2 %    Basophil % 1.2 0.0 - 1.5 %    Immature Grans % 0.6 (H) 0.0 - 0.5 %    Neutrophils, Absolute 3.83 1.70 - 7.00 10*3/mm3    Lymphocytes, Absolute 0.63 (L) 0.70 - 3.10 10*3/mm3    Monocytes, Absolute 0.21 0.10 - 0.90 10*3/mm3    Eosinophils, Absolute 0.17 0.00 - 0.40 10*3/mm3    Basophils, Absolute 0.06 0.00 - 0.20 10*3/mm3    Immature Grans, Absolute 0.03 0.00 - 0.05 10*3/mm3    nRBC 0.0 0.0 - 0.2 /100 WBC         Assessment/Plan   Pt needs wt loss. Wants h pylori treated first.  James. I encouraged her to stay on cpap.        Right shoulder pain. Had many inj.   Reaching up and sever pain.    Lower back pain and shoulder pian. Asking for pain meds. T3.  Had 30 in the past with prior md and last 6 month.   Only  for acute pain.      Will restart tens unit.        Diagnoses and all orders for this visit:    1. Chronic right shoulder pain (Primary)  -     XR Shoulder 2+ View Right; Future  -     acetaminophen-codeine (TYLENOL #3) 300-30 MG per tablet; Take 1 tablet by mouth Every 4 (Four) Hours As Needed for Moderate Pain .  Dispense: 10 tablet; Refill: 0    2. H. pylori infection    3. Chronic bilateral low back pain without sciatica  -     acetaminophen-codeine (TYLENOL #3) 300-30 MG per tablet; Take 1 tablet by mouth Every 4 (Four) Hours As Needed for Moderate Pain .  Dispense: 10 tablet; Refill: 0    Other orders  -     clarithromycin (Biaxin) 500 MG tablet; Take 1 tablet by mouth Every 12 (Twelve) Hours.  Dispense: 28 tablet; Refill: 0  -     metroNIDAZOLE (Flagyl) 500 MG tablet; Take 1 tablet by mouth 3 (Three) Times a Day for 14 days  Dispense: 42 tablet; Refill: 0  -     doxycycline (VIBRAMYCIN) 100 MG capsule; Take 1 capsule by mouth 2 (Two) Times a Day.  Dispense: 28 capsule; Refill: 0      Return in about 3 months (around 12/21/2021).     controlled substance contract signed.   Pt agrees to minimize T#3 usage to just sever pain.         There are no Patient Instructions on file for this visit.     Sanjeev Rawls MD    Assessment/Plan

## 2021-09-22 NOTE — PROGRESS NOTES
Please let them know the should xr with some abnormalities. May need to see a shoulder surgeon. Is she willing to go and does she have one she wants to see.

## 2021-09-27 DIAGNOSIS — G72.9 POLYMYOPATHY: ICD-10-CM

## 2021-09-27 DIAGNOSIS — N18.30 CHRONIC RENAL IMPAIRMENT, STAGE 3 (MODERATE), UNSPECIFIED WHETHER STAGE 3A OR 3B CKD (HCC): ICD-10-CM

## 2021-09-27 DIAGNOSIS — M33.20 POLYMYOSITIS (HCC): Primary | ICD-10-CM

## 2021-09-28 ENCOUNTER — TELEPHONE (OUTPATIENT)
Dept: INTERNAL MEDICINE | Facility: CLINIC | Age: 66
End: 2021-09-28

## 2021-09-28 NOTE — TELEPHONE ENCOUNTER
Caller: Raquel Mariee    Relationship to patient: Self    Best call back number:     [phone]    Chief complaint:     PATIENT STATED DR STANLEY WANTED HER TO SCHEDULE AN APPOINTMENT IN ONE TO TWO WEEKS (10/6 - 10/13)    Type of visit:     OFFICE VISIT TO GO OVER MUSCLE ISSUES AND MEDICATION    Requested date:     SEE ABOVE    If rescheduling, when is the original appointment:     N/A    Additional notes:    N/A    DR STANLEY

## 2021-09-29 RX ORDER — FLUCONAZOLE 150 MG/1
150 TABLET ORAL ONCE
Qty: 1 TABLET | Refills: 0 | Status: SHIPPED | OUTPATIENT
Start: 2021-09-29 | End: 2021-09-30

## 2021-09-29 NOTE — TELEPHONE ENCOUNTER
Scheduled patient for 10/21/2021 at 11:00AM sent patient a [employer] message informing.  If patient calls back please inform, hub may deliver message.

## 2021-10-06 LAB
BUN SERPL-MCNC: 11 MG/DL (ref 8–27)
BUN/CREAT SERPL: 9 (ref 12–28)
CALCIUM SERPL-MCNC: 9.2 MG/DL (ref 8.7–10.3)
CHLORIDE SERPL-SCNC: 92 MMOL/L (ref 96–106)
CO2 SERPL-SCNC: 23 MMOL/L (ref 20–29)
CREAT SERPL-MCNC: 1.29 MG/DL (ref 0.57–1)
ENDOMYSIAL ANTIBODY TITER IGA: NORMAL TITER
ENDOMYSIAL ANTIBODY TITER IGG: NORMAL TITER
GLIADIN PEPTIDE IGA SER-ACNC: 5 UNITS (ref 0–19)
GLIADIN PEPTIDE IGG SER-ACNC: 3 UNITS (ref 0–19)
GLUCOSE SERPL-MCNC: 178 MG/DL (ref 65–99)
H PYLORI IGA SER-ACNC: <9 UNITS (ref 0–8.9)
H PYLORI IGG SER IA-ACNC: 1.72 INDEX VALUE (ref 0–0.79)
H PYLORI IGM SER-ACNC: <9 UNITS (ref 0–8.9)
IGA SERPL-MCNC: 148 MG/DL (ref 87–352)
IGE SERPL-ACNC: 165 IU/ML (ref 6–495)
IGG SERPL-MCNC: 1198 MG/DL (ref 586–1602)
IGM SERPL-MCNC: 29 MG/DL (ref 26–217)
POTASSIUM SERPL-SCNC: 4.9 MMOL/L (ref 3.5–5.2)
SODIUM SERPL-SCNC: 129 MMOL/L (ref 134–144)
TTG IGA SER-ACNC: <2 U/ML (ref 0–3)
TTG IGG SER-ACNC: 4 U/ML (ref 0–5)

## 2021-10-06 RX ORDER — FLUCONAZOLE 100 MG/1
100 TABLET ORAL DAILY
Qty: 7 TABLET | Refills: 0 | Status: SHIPPED | OUTPATIENT
Start: 2021-10-06 | End: 2021-11-10

## 2021-10-07 ENCOUNTER — OFFICE VISIT (OUTPATIENT)
Dept: INTERNAL MEDICINE | Facility: CLINIC | Age: 66
End: 2021-10-07

## 2021-10-07 VITALS
RESPIRATION RATE: 16 BRPM | SYSTOLIC BLOOD PRESSURE: 128 MMHG | OXYGEN SATURATION: 96 % | BODY MASS INDEX: 39.65 KG/M2 | DIASTOLIC BLOOD PRESSURE: 84 MMHG | TEMPERATURE: 97.3 F | HEART RATE: 95 BPM | HEIGHT: 65 IN | WEIGHT: 238 LBS

## 2021-10-07 DIAGNOSIS — G47.33 OSA (OBSTRUCTIVE SLEEP APNEA): ICD-10-CM

## 2021-10-07 DIAGNOSIS — E87.1 HYPONATREMIA: Primary | ICD-10-CM

## 2021-10-07 DIAGNOSIS — M33.20 POLYMYOSITIS (HCC): ICD-10-CM

## 2021-10-07 DIAGNOSIS — A04.8 H. PYLORI INFECTION: ICD-10-CM

## 2021-10-07 PROCEDURE — 99214 OFFICE O/P EST MOD 30 MIN: CPT | Performed by: INTERNAL MEDICINE

## 2021-10-07 NOTE — PROGRESS NOTES
Subjective     Patient ID: Raquel Mariee is a 65 y.o. female. Patient is here for management of multiple medical problems.     Chief Complaint   Patient presents with   • Asthma     History of Present Illness     Asthma      Hyponatermia.      Polymyositis seems controlled.      rotect not making right on cpap recall.    The following portions of the patient's history were reviewed and updated as appropriate: allergies, current medications, past family history, past medical history, past social history, past surgical history and problem list.    Review of Systems    Current Outpatient Medications:   •  acetaminophen-codeine (TYLENOL #3) 300-30 MG per tablet, Take 1 tablet by mouth Every 4 (Four) Hours As Needed for Moderate Pain ., Disp: 10 tablet, Rfl: 0  •  albuterol sulfate  (90 Base) MCG/ACT inhaler, Inhale 2 puffs by mouth every 4-6 hours as needed for cough, wheeze, shortness of breath. Use with vortex spacer, Disp: 8.5 g, Rfl: 3  •  allopurinol (Zyloprim) 100 MG tablet, Take 1 tablet by mouth Daily., Disp: 90 tablet, Rfl: 3  •  amitriptyline (ELAVIL) 10 MG tablet, Take 2 tablets by mouth Every Evening., Disp: 90 tablet, Rfl: 3  •  Azelastine HCl 137 MCG/SPRAY solution, Instill 1-2 sprays in each nostril twice a day as needed, Disp: 30 mL, Rfl: 11  •  betamethasone valerate (VALISONE) 0.1 % cream, Apply topically to the appropriate area as directed Daily., Disp: 45 g, Rfl: 11  •  buPROPion SR (WELLBUTRIN SR) 150 MG 12 hr tablet, Take 1 tablet by mouth 2 (two) times a day., Disp: 180 tablet, Rfl: 3  •  calcium carbonate (Antacid Maximum) 1000 MG chewable tablet, Chew 500 mg 3 (Three) Times a Day., Disp: , Rfl:   •  CBD (cannabidiol) oral oil, Take  by mouth 2 (Two) Times a Day., Disp: , Rfl:   •  chlorhexidine (PERIDEX) 0.12 % solution, Swish 15 mL around mouth for 30 seconds, then spit out. Use twice daily as directed. , Disp: 473 mL, Rfl: 0  •  chlorhexidine (PERIDEX) 0.12 % solution, Rinse mouth  with 15mL (1 capful) for 30 seconds in the morning and evening after brushing teeth. Spit out after rinsing. Do not swallow., Disp: 473 mL, Rfl: 0  •  cholecalciferol (VITAMIN D3) 25 MCG (1000 UT) tablet, Take 1,000 Units by mouth Daily., Disp: , Rfl:   •  clonazePAM (KlonoPIN) 0.5 MG tablet, Take 1 tablet by mouth At Night As Needed, Disp: 30 tablet, Rfl: 1  •  Dextromethorphan-guaiFENesin (Mucus-DM)  MG tablet sustained-release 12 hour, Take  by mouth., Disp: , Rfl:   •  dicyclomine (BENTYL) 10 MG capsule, Take 1 capsule by mouth 3 (Three) Times a Day., Disp: 270 capsule, Rfl: 3  •  diphenhydrAMINE (Benadryl Allergy) 25 MG tablet, Take 1-2 tablets by mouth Every 4-6 Hours As Needed., Disp: 90 tablet, Rfl: 11  •  EPINEPHrine (EPIPEN) 0.3 MG/0.3ML solution auto-injector injection, Use as directed for acute allergic reaction, Disp: 2 each, Rfl: 3  •  EPINEPHrine (EPIPEN) 0.3 MG/0.3ML solution auto-injector injection, Use as directed for acute allergic reaction, Disp: 2 each, Rfl: 3  •  famotidine (PEPCID) 20 MG tablet, Take 20 mg by mouth 2 (Two) Times a Day., Disp: , Rfl:   •  ferrous sulfate 325 (65 Fe) MG tablet, Take  by mouth., Disp: , Rfl:   •  fexofenadine (ALLEGRA) 180 MG tablet, Take 180 mg by mouth Daily., Disp: , Rfl:   •  fluconazole (Diflucan) 100 MG tablet, Take 1 tablet by mouth Daily., Disp: 7 tablet, Rfl: 0  •  fluticasone (FLONASE) 50 MCG/ACT nasal spray, Instill 2 sprays into each nostril daily., Disp: 16 g, Rfl: 11  •  fluticasone (FLOVENT DISKUS) 50 MCG/BLIST diskus inhaler, Inhale 1 puff 2 (Two) Times a Day., Disp: , Rfl:   •  fluticasone-salmeterol (Advair HFA) 230-21 MCG/ACT inhaler, Inhale 2 puffs by mouth 2 (Two) Times a Day., Disp: 12 g, Rfl: 11  •  fluticasone-salmeterol (ADVAIR) 500-50 MCG/DOSE DISKUS, Inhale 1 puff., Disp: , Rfl:   •  folic acid (FOLVITE) 1 MG tablet, Take 1 tablet by mouth Daily., Disp: 90 tablet, Rfl: 3  •  furosemide (LASIX) 40 MG tablet, Take 1 tablet by mouth  Daily., Disp: 90 tablet, Rfl: 3  •  ipratropium (ATROVENT) 0.06 % nasal spray, Spray 2 sprays into the nostril(s) every night at bedtime as directed by provider, Disp: 15 mL, Rfl: 12  •  ipratropium-albuterol (DUO-NEB) 0.5-2.5 mg/3 ml nebulizer, Inhale contents of 1 vial (3mL) via nebulizer every 4-6 hours as needed for coughing, wheezing, or shortness of breath., Disp: 300 mL, Rfl: 3  •  isosorbide dinitrate (ISORDIL) 10 MG tablet, Take 1 tablet by mouth 3 (Three) Times a Day., Disp: 270 tablet, Rfl: 3  •  levothyroxine (SYNTHROID, LEVOTHROID) 50 MCG tablet, Take 1 tablet by mouth Daily., Disp: 90 tablet, Rfl: 3  •  Magnesium 250 MG tablet, Take 500 mg by mouth Daily. Two tablets, Disp: , Rfl:   •  melatonin 5 MG tablet tablet, Take 5 mg by mouth Every Night., Disp: , Rfl:   •  methotrexate 2.5 MG tablet, Take 2 tablets by mouth 1 (One) Time Per Week., Disp: 8 tablet, Rfl: 2  •  methylPREDNISolone (MEDROL) 4 MG dose pack, Take as directed on package instructions., Disp: 21 each, Rfl: 0  •  metroNIDAZOLE (Flagyl) 500 MG tablet, Take 1 tablet by mouth 3 (Three) Times a Day for 14 days, Disp: 42 tablet, Rfl: 0  •  montelukast (SINGULAIR) 10 MG tablet, Take 10 mg by mouth Every Night., Disp: , Rfl:   •  montelukast (SINGULAIR) 10 MG tablet, Take 1 tablet by mouth every night at bedtime., Disp: 30 tablet, Rfl: 11  •  multivitamin with minerals (ONE-A-DAY PROACTIVE 65+ PO), Take 1 tablet by mouth Daily., Disp: , Rfl:   •  omeprazole (priLOSEC) 40 MG capsule, Take 1 capsule by mouth 2 (two) times a day., Disp: 180 capsule, Rfl: 3  •  ondansetron (ZOFRAN) 4 MG tablet, Take 1 tablet by mouth Every 8 (Eight) Hours As Needed for Nausea or Vomiting., Disp: 30 tablet, Rfl: 11  •  potassium citrate (UROCIT-K) 10 MEQ (1080 MG) CR tablet, Take 1 tablet by mouth Daily., Disp: 90 tablet, Rfl: 3  •  predniSONE (DELTASONE) 20 MG tablet, Take 2 tablets by mouth 2 hours before gammagard infusion, Disp: 6 tablet, Rfl: 0  •  predniSONE  "(DELTASONE) 5 MG tablet, Take 1 tablet by mouth Daily., Disp: 30 tablet, Rfl: 1  •  promethazine-dextromethorphan (PROMETHAZINE-DM) 6.25-15 MG/5ML syrup, Take 5 mL by mouth 4 (Four) Times a Day As Needed for Cough., Disp: 240 mL, Rfl: 0  •  Spacer/Aero-Holding Chambers (AeroChamber Plus Wali-Vu) misc, Use as directed with albuterol inhaler, Disp: 1 each, Rfl: 0  •  sucralfate (Carafate) 1 g tablet, Take 1 tablet by mouth 4 (Four) Times a Day., Disp: 120 tablet, Rfl: 1  •  tiotropium bromide monohydrate (SPIRIVA RESPIMAT) 2.5 MCG/ACT aerosol solution inhaler, Inhale 2 puffs by mouth daily, Disp: 4 g, Rfl: 11  •  tiZANidine (ZANAFLEX) 4 MG tablet, Take 1 tablet by mouth Every Night., Disp: 90 tablet, Rfl: 1  •  triazolam (HALCION) 0.25 MG tablet, Bring to appointment to be administered for anxiolysis., Disp: 2 tablet, Rfl: 0  •  ubrogepant (ubrogepant) 100 MG tablet, Take 1 tablet by mouth once per day as needed at onset of migraine, Disp: 10 tablet, Rfl: 11  •  valsartan (DIOVAN) 160 MG tablet, Take 1 tablet by mouth Daily., Disp: 90 tablet, Rfl: 3  •  vitamin B-12 (CYANOCOBALAMIN) 1000 MCG tablet, Take 1,000 mcg by mouth 2 (two) times a day., Disp: , Rfl:     Current Facility-Administered Medications:   •  heparin injection 500 Units, 5 mL, Intravenous, PRN, Sanjeev Rawls MD  •  sodium chloride 0.9 % flush 10 mL, 10 mL, Intravenous, Q12H, Sanjeev Rawls MD  •  sodium chloride 0.9 % flush 10 mL, 10 mL, Intravenous, PRN, Sanjeev Rawls MD  •  sodium chloride 0.9 % flush 20 mL, 20 mL, Intravenous, PRNSinai Michael A, MD    Objective      Blood pressure 128/84, pulse 95, temperature 97.3 °F (36.3 °C), resp. rate 16, height 165.1 cm (65\"), weight 108 kg (238 lb), SpO2 96 %, not currently breastfeeding.    Physical Exam     General Appearance:    Alert, cooperative, no distress, appears stated age   Head:    Normocephalic, without obvious abnormality, atraumatic   Eyes:    PERRL, conjunctiva/corneas clear, " EOM's intact   Ears:    Normal TM's and external ear canals, both ears   Nose:   Nares normal, septum midline, mucosa normal, no drainage   or sinus tenderness   Throat:   Lips, mucosa, and tongue normal; teeth and gums normal   Neck:   Supple, symmetrical, trachea midline, no adenopathy;        thyroid:  No enlargement/tenderness/nodules; no carotid    bruit or JVD   Back:     Symmetric, no curvature, ROM normal, no CVA tenderness   Lungs:     Clear to auscultation bilaterally, respirations unlabored   Chest wall:    No tenderness or deformity   Heart:    Regular rate and rhythm, S1 and S2 normal, no murmur,        rub or gallop   Abdomen:     Soft, non-tender, bowel sounds active all four quadrants,     no masses, no organomegaly   Extremities:   Extremities normal, atraumatic, no cyanosis or edema   Pulses:   2+ and symmetric all extremities   Skin:   Skin color, texture, turgor normal, no rashes or lesions   Lymph nodes:   Cervical, supraclavicular, and axillary nodes normal   Neurologic:   CNII-XII intact. Normal strength, sensation and reflexes       throughout      Results for orders placed or performed during the hospital encounter of 09/13/21   IgG    Specimen: Blood   Result Value Ref Range    IgG 903 700-1,600 mg/dL   Comprehensive Metabolic Panel    Specimen: Blood   Result Value Ref Range    Glucose 174 (H) 65 - 99 mg/dL    BUN 13 8 - 23 mg/dL    Creatinine 1.35 (H) 0.57 - 1.00 mg/dL    Sodium 133 (L) 136 - 145 mmol/L    Potassium 4.2 3.5 - 5.2 mmol/L    Chloride 96 (L) 98 - 107 mmol/L    CO2 24.7 22.0 - 29.0 mmol/L    Calcium 9.1 8.6 - 10.5 mg/dL    Total Protein 6.9 6.0 - 8.5 g/dL    Albumin 4.30 3.50 - 5.20 g/dL    ALT (SGPT) 16 1 - 33 U/L    AST (SGOT) 23 1 - 32 U/L    Alkaline Phosphatase 76 39 - 117 U/L    Total Bilirubin 0.3 0.0 - 1.2 mg/dL    eGFR Non African Amer 39 (L) >60 mL/min/1.73    Globulin 2.6 gm/dL    A/G Ratio 1.7 g/dL    BUN/Creatinine Ratio 9.6 7.0 - 25.0    Anion Gap 12.3 5.0 -  15.0 mmol/L   CBC Auto Differential    Specimen: Blood   Result Value Ref Range    WBC 4.93 3.40 - 10.80 10*3/mm3    RBC 3.73 (L) 3.77 - 5.28 10*6/mm3    Hemoglobin 12.2 12.0 - 15.9 g/dL    Hematocrit 35.4 34.0 - 46.6 %    MCV 94.9 79.0 - 97.0 fL    MCH 32.7 26.6 - 33.0 pg    MCHC 34.5 31.5 - 35.7 g/dL    RDW 13.6 12.3 - 15.4 %    RDW-SD 47.3 37.0 - 54.0 fl    MPV 8.9 6.0 - 12.0 fL    Platelets 249 140 - 450 10*3/mm3    Neutrophil % 77.7 (H) 42.7 - 76.0 %    Lymphocyte % 12.8 (L) 19.6 - 45.3 %    Monocyte % 4.3 (L) 5.0 - 12.0 %    Eosinophil % 3.4 0.3 - 6.2 %    Basophil % 1.2 0.0 - 1.5 %    Immature Grans % 0.6 (H) 0.0 - 0.5 %    Neutrophils, Absolute 3.83 1.70 - 7.00 10*3/mm3    Lymphocytes, Absolute 0.63 (L) 0.70 - 3.10 10*3/mm3    Monocytes, Absolute 0.21 0.10 - 0.90 10*3/mm3    Eosinophils, Absolute 0.17 0.00 - 0.40 10*3/mm3    Basophils, Absolute 0.06 0.00 - 0.20 10*3/mm3    Immature Grans, Absolute 0.03 0.00 - 0.05 10*3/mm3    nRBC 0.0 0.0 - 0.2 /100 WBC         Assessment/Plan   Pt off pred and only on MTX 5mg.      Off h pylori treatment and much better.   Will start rybelsus.    rotect not making right on cpap recall.  Order in to change dme      Diagnoses and all orders for this visit:    1. Hyponatremia (Primary)    2. Polymyositis (HCC)    3. H. pylori infection    4. KALYN (obstructive sleep apnea)  -     CPAP Therapy      Return in about 3 months (around 1/7/2022).          There are no Patient Instructions on file for this visit.     Sanjeev Rawls MD    Assessment/Plan

## 2021-10-13 ENCOUNTER — HOSPITAL ENCOUNTER (OUTPATIENT)
Dept: INFUSION THERAPY | Facility: HOSPITAL | Age: 66
Setting detail: INFUSION SERIES
Discharge: HOME | End: 2021-10-13

## 2021-10-13 VITALS
OXYGEN SATURATION: 97 % | WEIGHT: 247 LBS | BODY MASS INDEX: 41.1 KG/M2 | DIASTOLIC BLOOD PRESSURE: 85 MMHG | SYSTOLIC BLOOD PRESSURE: 154 MMHG | TEMPERATURE: 97.8 F | RESPIRATION RATE: 16 BRPM | HEART RATE: 92 BPM

## 2021-10-13 DIAGNOSIS — Z95.828 PORT-A-CATH IN PLACE: Primary | ICD-10-CM

## 2021-10-13 DIAGNOSIS — D80.1 COMMON VARIABLE AGAMMAGLOBULINEMIA (HCC): ICD-10-CM

## 2021-10-13 LAB
ALBUMIN SERPL-MCNC: 4.4 G/DL (ref 3.5–5.2)
ALBUMIN/GLOB SERPL: 1.6 G/DL
ALP SERPL-CCNC: 70 U/L (ref 39–117)
ALT SERPL W P-5'-P-CCNC: 20 U/L (ref 1–33)
ANION GAP SERPL CALCULATED.3IONS-SCNC: 10.1 MMOL/L (ref 5–15)
AST SERPL-CCNC: 25 U/L (ref 1–32)
BASOPHILS # BLD AUTO: 0.06 10*3/MM3 (ref 0–0.2)
BASOPHILS NFR BLD AUTO: 1.3 % (ref 0–1.5)
BILIRUB SERPL-MCNC: 0.4 MG/DL (ref 0–1.2)
BUN SERPL-MCNC: 15 MG/DL (ref 8–23)
BUN/CREAT SERPL: 10.9 (ref 7–25)
CALCIUM SPEC-SCNC: 9.4 MG/DL (ref 8.6–10.5)
CHLORIDE SERPL-SCNC: 94 MMOL/L (ref 98–107)
CK SERPL-CCNC: 189 U/L (ref 20–180)
CO2 SERPL-SCNC: 24.9 MMOL/L (ref 22–29)
CREAT SERPL-MCNC: 1.38 MG/DL (ref 0.57–1)
DEPRECATED RDW RBC AUTO: 47.4 FL (ref 37–54)
EOSINOPHIL # BLD AUTO: 0.15 10*3/MM3 (ref 0–0.4)
EOSINOPHIL NFR BLD AUTO: 3.3 % (ref 0.3–6.2)
ERYTHROCYTE [DISTWIDTH] IN BLOOD BY AUTOMATED COUNT: 13.6 % (ref 12.3–15.4)
GFR SERPL CREATININE-BSD FRML MDRD: 38 ML/MIN/1.73
GLOBULIN UR ELPH-MCNC: 2.7 GM/DL
GLUCOSE SERPL-MCNC: 152 MG/DL (ref 65–99)
HCT VFR BLD AUTO: 35.1 % (ref 34–46.6)
HGB BLD-MCNC: 12.2 G/DL (ref 12–15.9)
IMM GRANULOCYTES # BLD AUTO: 0.02 10*3/MM3 (ref 0–0.05)
IMM GRANULOCYTES NFR BLD AUTO: 0.4 % (ref 0–0.5)
LYMPHOCYTES # BLD AUTO: 1.3 10*3/MM3 (ref 0.7–3.1)
LYMPHOCYTES NFR BLD AUTO: 28.3 % (ref 19.6–45.3)
MCH RBC QN AUTO: 33.2 PG (ref 26.6–33)
MCHC RBC AUTO-ENTMCNC: 34.8 G/DL (ref 31.5–35.7)
MCV RBC AUTO: 95.4 FL (ref 79–97)
MONOCYTES # BLD AUTO: 0.51 10*3/MM3 (ref 0.1–0.9)
MONOCYTES NFR BLD AUTO: 11.1 % (ref 5–12)
MYOGLOBIN SERPL-MCNC: 92.8 NG/ML (ref 25–58)
NEUTROPHILS NFR BLD AUTO: 2.56 10*3/MM3 (ref 1.7–7)
NEUTROPHILS NFR BLD AUTO: 55.6 % (ref 42.7–76)
NRBC BLD AUTO-RTO: 0 /100 WBC (ref 0–0.2)
PLATELET # BLD AUTO: 230 10*3/MM3 (ref 140–450)
PMV BLD AUTO: 8.5 FL (ref 6–12)
POTASSIUM SERPL-SCNC: 4.6 MMOL/L (ref 3.5–5.2)
PROT SERPL-MCNC: 7.1 G/DL (ref 6–8.5)
RBC # BLD AUTO: 3.68 10*6/MM3 (ref 3.77–5.28)
SODIUM SERPL-SCNC: 129 MMOL/L (ref 136–145)
WBC # BLD AUTO: 4.6 10*3/MM3 (ref 3.4–10.8)

## 2021-10-13 PROCEDURE — 25010000002 HEPARIN LOCK FLUSH PER 10 UNITS: Performed by: INTERNAL MEDICINE

## 2021-10-13 PROCEDURE — 25010000002 IMMUNE GLOBULIN (HUMAN) 20 GM/200ML SOLUTION: Performed by: ALLERGY & IMMUNOLOGY

## 2021-10-13 PROCEDURE — 82085 ASSAY OF ALDOLASE: CPT | Performed by: INTERNAL MEDICINE

## 2021-10-13 PROCEDURE — 80053 COMPREHEN METABOLIC PANEL: CPT | Performed by: INTERNAL MEDICINE

## 2021-10-13 PROCEDURE — 82550 ASSAY OF CK (CPK): CPT | Performed by: INTERNAL MEDICINE

## 2021-10-13 PROCEDURE — 96365 THER/PROPH/DIAG IV INF INIT: CPT

## 2021-10-13 PROCEDURE — 83874 ASSAY OF MYOGLOBIN: CPT | Performed by: INTERNAL MEDICINE

## 2021-10-13 PROCEDURE — 85025 COMPLETE CBC W/AUTO DIFF WBC: CPT | Performed by: INTERNAL MEDICINE

## 2021-10-13 PROCEDURE — 96366 THER/PROPH/DIAG IV INF ADDON: CPT

## 2021-10-13 RX ORDER — METHYLPREDNISOLONE SODIUM SUCCINATE 125 MG/2ML
50 INJECTION, POWDER, LYOPHILIZED, FOR SOLUTION INTRAMUSCULAR; INTRAVENOUS ONCE AS NEEDED
Status: DISCONTINUED | OUTPATIENT
Start: 2021-10-13 | End: 2021-10-15 | Stop reason: HOSPADM

## 2021-10-13 RX ORDER — EPINEPHRINE 1 MG/ML
0.3 INJECTION, SOLUTION INTRAMUSCULAR; SUBCUTANEOUS ONCE AS NEEDED
Status: CANCELLED
Start: 2021-11-10

## 2021-10-13 RX ORDER — SODIUM CHLORIDE 0.9 % (FLUSH) 0.9 %
10 SYRINGE (ML) INJECTION AS NEEDED
Status: CANCELLED | OUTPATIENT
Start: 2021-10-13

## 2021-10-13 RX ORDER — DIPHENHYDRAMINE HCL 25 MG
50 CAPSULE ORAL ONCE
Status: CANCELLED
Start: 2021-11-10 | End: 2021-11-10

## 2021-10-13 RX ORDER — PREDNISONE 20 MG/1
40 TABLET ORAL ONCE
Status: CANCELLED
Start: 2021-11-10 | End: 2021-11-10

## 2021-10-13 RX ORDER — ACETAMINOPHEN 325 MG/1
325 TABLET ORAL ONCE
Status: CANCELLED | OUTPATIENT
Start: 2021-11-10

## 2021-10-13 RX ORDER — ORAL SEMAGLUTIDE 3 MG/1
6 TABLET ORAL DAILY
COMMUNITY
End: 2021-12-07 | Stop reason: SDUPTHER

## 2021-10-13 RX ORDER — DIPHENHYDRAMINE HCL 25 MG
50 CAPSULE ORAL ONCE AS NEEDED
Status: CANCELLED
Start: 2021-11-10

## 2021-10-13 RX ORDER — DIPHENHYDRAMINE HCL 25 MG
50 CAPSULE ORAL ONCE AS NEEDED
Status: DISCONTINUED | OUTPATIENT
Start: 2021-10-13 | End: 2021-10-15 | Stop reason: HOSPADM

## 2021-10-13 RX ORDER — HEPARIN SODIUM (PORCINE) LOCK FLUSH IV SOLN 100 UNIT/ML 100 UNIT/ML
500 SOLUTION INTRAVENOUS AS NEEDED
Status: DISCONTINUED | OUTPATIENT
Start: 2021-10-13 | End: 2021-10-15 | Stop reason: HOSPADM

## 2021-10-13 RX ORDER — PREDNISONE 20 MG/1
40 TABLET ORAL ONCE
Status: DISCONTINUED | OUTPATIENT
Start: 2021-10-13 | End: 2021-10-15 | Stop reason: HOSPADM

## 2021-10-13 RX ORDER — PREDNISONE 20 MG/1
40 TABLET ORAL ONCE AS NEEDED
Status: CANCELLED
Start: 2021-11-10

## 2021-10-13 RX ORDER — ACETAMINOPHEN 325 MG/1
325 TABLET ORAL ONCE
Status: DISCONTINUED | OUTPATIENT
Start: 2021-10-13 | End: 2021-10-15 | Stop reason: HOSPADM

## 2021-10-13 RX ORDER — PREDNISONE 20 MG/1
40 TABLET ORAL ONCE AS NEEDED
Status: DISCONTINUED | OUTPATIENT
Start: 2021-10-13 | End: 2021-10-15 | Stop reason: HOSPADM

## 2021-10-13 RX ORDER — HEPARIN SODIUM (PORCINE) LOCK FLUSH IV SOLN 100 UNIT/ML 100 UNIT/ML
500 SOLUTION INTRAVENOUS AS NEEDED
Status: CANCELLED | OUTPATIENT
Start: 2021-10-13

## 2021-10-13 RX ORDER — EPINEPHRINE 1 MG/ML
0.3 INJECTION, SOLUTION INTRAMUSCULAR; SUBCUTANEOUS ONCE AS NEEDED
Status: DISCONTINUED | OUTPATIENT
Start: 2021-10-13 | End: 2021-10-15 | Stop reason: HOSPADM

## 2021-10-13 RX ORDER — SODIUM CHLORIDE 0.9 % (FLUSH) 0.9 %
10 SYRINGE (ML) INJECTION AS NEEDED
Status: DISCONTINUED | OUTPATIENT
Start: 2021-10-13 | End: 2021-10-15 | Stop reason: HOSPADM

## 2021-10-13 RX ORDER — DIPHENHYDRAMINE HCL 25 MG
50 CAPSULE ORAL ONCE
Status: DISCONTINUED | OUTPATIENT
Start: 2021-10-13 | End: 2021-10-15 | Stop reason: HOSPADM

## 2021-10-13 RX ORDER — METHYLPREDNISOLONE SODIUM SUCCINATE 125 MG/2ML
50 INJECTION, POWDER, LYOPHILIZED, FOR SOLUTION INTRAMUSCULAR; INTRAVENOUS ONCE AS NEEDED
Status: CANCELLED
Start: 2021-11-10

## 2021-10-13 RX ADMIN — HEPARIN 500 UNITS: 100 SYRINGE at 12:24

## 2021-10-13 RX ADMIN — IMMUNE GLOBULIN INFUSION (HUMAN) 20 G: 100 INJECTION, SOLUTION INTRAVENOUS; SUBCUTANEOUS at 09:30

## 2021-10-13 RX ADMIN — IMMUNE GLOBULIN INFUSION (HUMAN) 20 G: 100 INJECTION, SOLUTION INTRAVENOUS; SUBCUTANEOUS at 11:24

## 2021-10-13 RX ADMIN — SODIUM CHLORIDE, PRESERVATIVE FREE 10 ML: 5 INJECTION INTRAVENOUS at 12:24

## 2021-10-20 LAB — ALDOLASE SERPL-CCNC: 4.3 U/L (ref 3.3–10.3)

## 2021-10-21 DIAGNOSIS — M33.20 POLYMYOSITIS (HCC): Primary | ICD-10-CM

## 2021-11-03 RX ORDER — AMITRIPTYLINE HYDROCHLORIDE 10 MG/1
TABLET, FILM COATED ORAL
Qty: 90 TABLET | Refills: 3 | Status: SHIPPED | OUTPATIENT
Start: 2021-11-03 | End: 2022-02-12

## 2021-11-09 ENCOUNTER — TELEPHONE (OUTPATIENT)
Dept: INTERNAL MEDICINE | Facility: CLINIC | Age: 66
End: 2021-11-09

## 2021-11-09 ENCOUNTER — HOSPITAL ENCOUNTER (OUTPATIENT)
Dept: MAMMOGRAPHY | Facility: HOSPITAL | Age: 66
Discharge: HOME | End: 2021-11-09

## 2021-11-09 ENCOUNTER — APPOINTMENT (OUTPATIENT)
Dept: BONE DENSITY | Facility: HOSPITAL | Age: 66
End: 2021-11-09

## 2021-11-09 DIAGNOSIS — Z12.31 BREAST CANCER SCREENING BY MAMMOGRAM: ICD-10-CM

## 2021-11-09 PROCEDURE — 77067 SCR MAMMO BI INCL CAD: CPT

## 2021-11-09 PROCEDURE — 77080 DXA BONE DENSITY AXIAL: CPT

## 2021-11-09 PROCEDURE — 77063 BREAST TOMOSYNTHESIS BI: CPT

## 2021-11-09 NOTE — TELEPHONE ENCOUNTER
Called patient with no answer, left her a voicemail that I would send this on to Dr. aRwls, but it be his half day the labs may not be ordered until tomorrow.

## 2021-11-09 NOTE — TELEPHONE ENCOUNTER
Caller: Raquel Mariee    Relationship: Self    Best call back number: [phone]    What orders are you requesting (i.e. lab or imaging): ORDER FOR HER LAB WORK     In what timeframe would the patient need to come in: TODAY IF POSSIBLE         Additional notes: THE PATIENT STATES THAT SHE IS HAVING AN INFUSION TOMORROW 11/10/2021 THE PATIENT STATES THAT DOCTOR LIZETH HAD REQUESTED THAT SHE HAVE LABS EACH MONTH PLEASE CALL PATIENT TO LET HER KNOW IF THAT CAN BE DONE

## 2021-11-10 ENCOUNTER — HOSPITAL ENCOUNTER (OUTPATIENT)
Dept: INFUSION THERAPY | Facility: HOSPITAL | Age: 66
Setting detail: INFUSION SERIES
Discharge: HOME | End: 2021-11-10

## 2021-11-10 VITALS
BODY MASS INDEX: 39.94 KG/M2 | DIASTOLIC BLOOD PRESSURE: 73 MMHG | RESPIRATION RATE: 16 BRPM | WEIGHT: 240 LBS | SYSTOLIC BLOOD PRESSURE: 148 MMHG | HEART RATE: 91 BPM | OXYGEN SATURATION: 97 % | TEMPERATURE: 98.4 F

## 2021-11-10 DIAGNOSIS — E87.1 HYPONATREMIA: Primary | ICD-10-CM

## 2021-11-10 DIAGNOSIS — M33.20 POLYMYOSITIS (HCC): ICD-10-CM

## 2021-11-10 DIAGNOSIS — Z95.828 PORT-A-CATH IN PLACE: ICD-10-CM

## 2021-11-10 DIAGNOSIS — D80.1 COMMON VARIABLE AGAMMAGLOBULINEMIA (HCC): Primary | ICD-10-CM

## 2021-11-10 LAB
ALBUMIN SERPL-MCNC: 4.3 G/DL (ref 3.5–5.2)
ALBUMIN/GLOB SERPL: 1.6 G/DL
ALP SERPL-CCNC: 68 U/L (ref 39–117)
ALT SERPL W P-5'-P-CCNC: 18 U/L (ref 1–33)
ANION GAP SERPL CALCULATED.3IONS-SCNC: 10.4 MMOL/L (ref 5–15)
AST SERPL-CCNC: 26 U/L (ref 1–32)
BASOPHILS # BLD AUTO: 0.06 10*3/MM3 (ref 0–0.2)
BASOPHILS NFR BLD AUTO: 1.4 % (ref 0–1.5)
BILIRUB SERPL-MCNC: 0.4 MG/DL (ref 0–1.2)
BUN SERPL-MCNC: 13 MG/DL (ref 8–23)
BUN/CREAT SERPL: 9.2 (ref 7–25)
CALCIUM SPEC-SCNC: 9.1 MG/DL (ref 8.6–10.5)
CHLORIDE SERPL-SCNC: 97 MMOL/L (ref 98–107)
CK SERPL-CCNC: 173 U/L (ref 20–180)
CO2 SERPL-SCNC: 23.6 MMOL/L (ref 22–29)
CREAT SERPL-MCNC: 1.42 MG/DL (ref 0.57–1)
DEPRECATED RDW RBC AUTO: 46.6 FL (ref 37–54)
EOSINOPHIL # BLD AUTO: 0.14 10*3/MM3 (ref 0–0.4)
EOSINOPHIL NFR BLD AUTO: 3.3 % (ref 0.3–6.2)
ERYTHROCYTE [DISTWIDTH] IN BLOOD BY AUTOMATED COUNT: 13.2 % (ref 12.3–15.4)
GFR SERPL CREATININE-BSD FRML MDRD: 37 ML/MIN/1.73
GLOBULIN UR ELPH-MCNC: 2.7 GM/DL
GLUCOSE SERPL-MCNC: 247 MG/DL (ref 65–99)
HCT VFR BLD AUTO: 36.8 % (ref 34–46.6)
HGB BLD-MCNC: 12.6 G/DL (ref 12–15.9)
IMM GRANULOCYTES # BLD AUTO: 0.02 10*3/MM3 (ref 0–0.05)
IMM GRANULOCYTES NFR BLD AUTO: 0.5 % (ref 0–0.5)
LYMPHOCYTES # BLD AUTO: 0.99 10*3/MM3 (ref 0.7–3.1)
LYMPHOCYTES NFR BLD AUTO: 23.2 % (ref 19.6–45.3)
MCH RBC QN AUTO: 32.8 PG (ref 26.6–33)
MCHC RBC AUTO-ENTMCNC: 34.2 G/DL (ref 31.5–35.7)
MCV RBC AUTO: 95.8 FL (ref 79–97)
MONOCYTES # BLD AUTO: 0.28 10*3/MM3 (ref 0.1–0.9)
MONOCYTES NFR BLD AUTO: 6.6 % (ref 5–12)
MYOGLOBIN SERPL-MCNC: 89.5 NG/ML (ref 25–58)
NEUTROPHILS NFR BLD AUTO: 2.77 10*3/MM3 (ref 1.7–7)
NEUTROPHILS NFR BLD AUTO: 65 % (ref 42.7–76)
NRBC BLD AUTO-RTO: 0 /100 WBC (ref 0–0.2)
PLATELET # BLD AUTO: 267 10*3/MM3 (ref 140–450)
PMV BLD AUTO: 8.9 FL (ref 6–12)
POTASSIUM SERPL-SCNC: 4.2 MMOL/L (ref 3.5–5.2)
PROT SERPL-MCNC: 7 G/DL (ref 6–8.5)
RBC # BLD AUTO: 3.84 10*6/MM3 (ref 3.77–5.28)
SODIUM SERPL-SCNC: 131 MMOL/L (ref 136–145)
WBC # BLD AUTO: 4.26 10*3/MM3 (ref 3.4–10.8)

## 2021-11-10 PROCEDURE — 83874 ASSAY OF MYOGLOBIN: CPT | Performed by: INTERNAL MEDICINE

## 2021-11-10 PROCEDURE — 96365 THER/PROPH/DIAG IV INF INIT: CPT

## 2021-11-10 PROCEDURE — 96366 THER/PROPH/DIAG IV INF ADDON: CPT

## 2021-11-10 PROCEDURE — 25010000002 IMMUNE GLOBULIN (HUMAN) 20 GM/200ML SOLUTION: Performed by: ALLERGY & IMMUNOLOGY

## 2021-11-10 PROCEDURE — 85025 COMPLETE CBC W/AUTO DIFF WBC: CPT | Performed by: INTERNAL MEDICINE

## 2021-11-10 PROCEDURE — 80053 COMPREHEN METABOLIC PANEL: CPT | Performed by: INTERNAL MEDICINE

## 2021-11-10 PROCEDURE — 82550 ASSAY OF CK (CPK): CPT | Performed by: INTERNAL MEDICINE

## 2021-11-10 PROCEDURE — 25010000002 HEPARIN LOCK FLUSH PER 10 UNITS: Performed by: INTERNAL MEDICINE

## 2021-11-10 RX ORDER — PREDNISONE 20 MG/1
40 TABLET ORAL ONCE
Status: CANCELLED
Start: 2021-12-08 | End: 2021-12-08

## 2021-11-10 RX ORDER — SODIUM CHLORIDE 0.9 % (FLUSH) 0.9 %
10 SYRINGE (ML) INJECTION AS NEEDED
Status: CANCELLED | OUTPATIENT
Start: 2021-11-10

## 2021-11-10 RX ORDER — ACETAMINOPHEN 325 MG/1
325 TABLET ORAL ONCE
Status: DISCONTINUED | OUTPATIENT
Start: 2021-11-10 | End: 2021-11-12 | Stop reason: HOSPADM

## 2021-11-10 RX ORDER — DIPHENHYDRAMINE HCL 25 MG
50 CAPSULE ORAL ONCE AS NEEDED
Status: CANCELLED
Start: 2021-12-08

## 2021-11-10 RX ORDER — HEPARIN SODIUM (PORCINE) LOCK FLUSH IV SOLN 100 UNIT/ML 100 UNIT/ML
500 SOLUTION INTRAVENOUS AS NEEDED
Status: DISCONTINUED | OUTPATIENT
Start: 2021-11-10 | End: 2021-11-12 | Stop reason: HOSPADM

## 2021-11-10 RX ORDER — DIPHENHYDRAMINE HCL 25 MG
50 CAPSULE ORAL ONCE
Status: CANCELLED
Start: 2021-12-08 | End: 2021-12-08

## 2021-11-10 RX ORDER — DIPHENHYDRAMINE HCL 25 MG
50 CAPSULE ORAL ONCE
Status: DISCONTINUED | OUTPATIENT
Start: 2021-11-10 | End: 2021-11-12 | Stop reason: HOSPADM

## 2021-11-10 RX ORDER — METHYLPREDNISOLONE SODIUM SUCCINATE 125 MG/2ML
50 INJECTION, POWDER, LYOPHILIZED, FOR SOLUTION INTRAMUSCULAR; INTRAVENOUS ONCE AS NEEDED
Status: CANCELLED
Start: 2021-12-08

## 2021-11-10 RX ORDER — PREDNISONE 20 MG/1
40 TABLET ORAL ONCE AS NEEDED
Status: CANCELLED
Start: 2021-12-08

## 2021-11-10 RX ORDER — SODIUM CHLORIDE 0.9 % (FLUSH) 0.9 %
10 SYRINGE (ML) INJECTION AS NEEDED
Status: DISCONTINUED | OUTPATIENT
Start: 2021-11-10 | End: 2021-11-12 | Stop reason: HOSPADM

## 2021-11-10 RX ORDER — HEPARIN SODIUM (PORCINE) LOCK FLUSH IV SOLN 100 UNIT/ML 100 UNIT/ML
500 SOLUTION INTRAVENOUS AS NEEDED
Status: CANCELLED | OUTPATIENT
Start: 2021-11-10

## 2021-11-10 RX ORDER — ACETAMINOPHEN 325 MG/1
325 TABLET ORAL ONCE
Status: CANCELLED | OUTPATIENT
Start: 2021-12-08

## 2021-11-10 RX ORDER — EPINEPHRINE 1 MG/ML
0.3 INJECTION, SOLUTION INTRAMUSCULAR; SUBCUTANEOUS ONCE AS NEEDED
Status: CANCELLED
Start: 2021-12-08

## 2021-11-10 RX ORDER — PREDNISONE 20 MG/1
40 TABLET ORAL ONCE
Status: DISCONTINUED | OUTPATIENT
Start: 2021-11-10 | End: 2021-11-12 | Stop reason: HOSPADM

## 2021-11-10 RX ADMIN — IMMUNE GLOBULIN (HUMAN) 20 G: 10 INJECTION INTRAVENOUS; SUBCUTANEOUS at 11:15

## 2021-11-10 RX ADMIN — HEPARIN 500 UNITS: 100 SYRINGE at 12:33

## 2021-11-10 RX ADMIN — SODIUM CHLORIDE, PRESERVATIVE FREE 10 ML: 5 INJECTION INTRAVENOUS at 12:32

## 2021-11-10 RX ADMIN — IMMUNE GLOBULIN (HUMAN) 20 G: 10 INJECTION INTRAVENOUS; SUBCUTANEOUS at 09:15

## 2021-11-29 ENCOUNTER — TRANSCRIBE ORDERS (OUTPATIENT)
Dept: ADMINISTRATIVE | Facility: HOSPITAL | Age: 66
End: 2021-11-29

## 2021-11-29 DIAGNOSIS — N63.10 LUMP OF RIGHT BREAST: Primary | ICD-10-CM

## 2021-12-06 DIAGNOSIS — N18.30 CHRONIC RENAL IMPAIRMENT, STAGE 3 (MODERATE), UNSPECIFIED WHETHER STAGE 3A OR 3B CKD (HCC): ICD-10-CM

## 2021-12-06 DIAGNOSIS — M33.20 POLYMYOSITIS (HCC): Primary | ICD-10-CM

## 2021-12-06 DIAGNOSIS — E11.9 TYPE 2 DIABETES MELLITUS WITHOUT COMPLICATION, WITHOUT LONG-TERM CURRENT USE OF INSULIN (HCC): ICD-10-CM

## 2021-12-07 DIAGNOSIS — F41.9 ANXIETY: ICD-10-CM

## 2021-12-07 RX ORDER — ORAL SEMAGLUTIDE 3 MG/1
6 TABLET ORAL DAILY
Qty: 30 TABLET | Refills: 1 | COMMUNITY
Start: 2021-12-07 | End: 2021-12-15 | Stop reason: SDUPTHER

## 2021-12-07 NOTE — TELEPHONE ENCOUNTER
Rx Refill Note  Requested Prescriptions     Signed Prescriptions Disp Refills   • Semaglutide (Rybelsus) 3 MG tablet 30 tablet 1     Sig: Take 2 tablets by mouth Daily.     Authorizing Provider: DEBI STANLEY     Ordering User: CHINO GRIFFIN      Last office visit with prescribing clinician: 10/7/2021      Next office visit with prescribing clinician: 12/15/2021            Chino Griffin MA  12/07/21, 13:38 EST

## 2021-12-08 ENCOUNTER — HOSPITAL ENCOUNTER (OUTPATIENT)
Dept: INFUSION THERAPY | Facility: HOSPITAL | Age: 66
Setting detail: INFUSION SERIES
Discharge: HOME | End: 2021-12-08

## 2021-12-08 VITALS
TEMPERATURE: 98 F | OXYGEN SATURATION: 98 % | SYSTOLIC BLOOD PRESSURE: 144 MMHG | DIASTOLIC BLOOD PRESSURE: 73 MMHG | HEART RATE: 94 BPM | RESPIRATION RATE: 20 BRPM

## 2021-12-08 DIAGNOSIS — Z95.828 PORT-A-CATH IN PLACE: ICD-10-CM

## 2021-12-08 DIAGNOSIS — D80.1 COMMON VARIABLE AGAMMAGLOBULINEMIA (HCC): Primary | ICD-10-CM

## 2021-12-08 LAB
ALBUMIN SERPL-MCNC: 4.6 G/DL (ref 3.5–5.2)
ALBUMIN/GLOB SERPL: 1.6 G/DL
ALP SERPL-CCNC: 75 U/L (ref 39–117)
ALT SERPL W P-5'-P-CCNC: 21 U/L (ref 1–33)
ANION GAP SERPL CALCULATED.3IONS-SCNC: 11.2 MMOL/L (ref 5–15)
AST SERPL-CCNC: 29 U/L (ref 1–32)
BASOPHILS # BLD AUTO: 0.06 10*3/MM3 (ref 0–0.2)
BASOPHILS NFR BLD AUTO: 1.4 % (ref 0–1.5)
BILIRUB SERPL-MCNC: 0.3 MG/DL (ref 0–1.2)
BUN SERPL-MCNC: 13 MG/DL (ref 8–23)
BUN/CREAT SERPL: 10.2 (ref 7–25)
CALCIUM SPEC-SCNC: 9.2 MG/DL (ref 8.6–10.5)
CHLORIDE SERPL-SCNC: 97 MMOL/L (ref 98–107)
CK SERPL-CCNC: 218 U/L (ref 20–180)
CO2 SERPL-SCNC: 22.8 MMOL/L (ref 22–29)
CREAT SERPL-MCNC: 1.27 MG/DL (ref 0.57–1)
DEPRECATED RDW RBC AUTO: 47 FL (ref 37–54)
EOSINOPHIL # BLD AUTO: 0.14 10*3/MM3 (ref 0–0.4)
EOSINOPHIL NFR BLD AUTO: 3.2 % (ref 0.3–6.2)
ERYTHROCYTE [DISTWIDTH] IN BLOOD BY AUTOMATED COUNT: 13.3 % (ref 12.3–15.4)
GFR SERPL CREATININE-BSD FRML MDRD: 42 ML/MIN/1.73
GLOBULIN UR ELPH-MCNC: 2.8 GM/DL
GLUCOSE SERPL-MCNC: 120 MG/DL (ref 65–99)
HCT VFR BLD AUTO: 36.3 % (ref 34–46.6)
HGB BLD-MCNC: 12.6 G/DL (ref 12–15.9)
IMM GRANULOCYTES # BLD AUTO: 0.02 10*3/MM3 (ref 0–0.05)
IMM GRANULOCYTES NFR BLD AUTO: 0.5 % (ref 0–0.5)
LYMPHOCYTES # BLD AUTO: 1.28 10*3/MM3 (ref 0.7–3.1)
LYMPHOCYTES NFR BLD AUTO: 28.8 % (ref 19.6–45.3)
MAGNESIUM SERPL-MCNC: 2.2 MG/DL (ref 1.6–2.4)
MCH RBC QN AUTO: 33.4 PG (ref 26.6–33)
MCHC RBC AUTO-ENTMCNC: 34.7 G/DL (ref 31.5–35.7)
MCV RBC AUTO: 96.3 FL (ref 79–97)
MONOCYTES # BLD AUTO: 0.47 10*3/MM3 (ref 0.1–0.9)
MONOCYTES NFR BLD AUTO: 10.6 % (ref 5–12)
MYOGLOBIN SERPL-MCNC: 91.5 NG/ML (ref 25–58)
NEUTROPHILS NFR BLD AUTO: 2.47 10*3/MM3 (ref 1.7–7)
NEUTROPHILS NFR BLD AUTO: 55.5 % (ref 42.7–76)
NRBC BLD AUTO-RTO: 0 /100 WBC (ref 0–0.2)
PLATELET # BLD AUTO: 241 10*3/MM3 (ref 140–450)
PMV BLD AUTO: 8.9 FL (ref 6–12)
POTASSIUM SERPL-SCNC: 4.1 MMOL/L (ref 3.5–5.2)
PROT SERPL-MCNC: 7.4 G/DL (ref 6–8.5)
RBC # BLD AUTO: 3.77 10*6/MM3 (ref 3.77–5.28)
SODIUM SERPL-SCNC: 131 MMOL/L (ref 136–145)
URATE SERPL-MCNC: 3.9 MG/DL (ref 2.4–5.7)
WBC NRBC COR # BLD: 4.44 10*3/MM3 (ref 3.4–10.8)

## 2021-12-08 PROCEDURE — 84550 ASSAY OF BLOOD/URIC ACID: CPT | Performed by: INTERNAL MEDICINE

## 2021-12-08 PROCEDURE — 82550 ASSAY OF CK (CPK): CPT | Performed by: INTERNAL MEDICINE

## 2021-12-08 PROCEDURE — 83874 ASSAY OF MYOGLOBIN: CPT | Performed by: INTERNAL MEDICINE

## 2021-12-08 PROCEDURE — 96365 THER/PROPH/DIAG IV INF INIT: CPT

## 2021-12-08 PROCEDURE — 25010000002 IMMUNE GLOBULIN (HUMAN) 20 GM/200ML SOLUTION: Performed by: ALLERGY & IMMUNOLOGY

## 2021-12-08 PROCEDURE — 25010000002 HEPARIN LOCK FLUSH PER 10 UNITS: Performed by: INTERNAL MEDICINE

## 2021-12-08 PROCEDURE — 80053 COMPREHEN METABOLIC PANEL: CPT | Performed by: INTERNAL MEDICINE

## 2021-12-08 PROCEDURE — 83735 ASSAY OF MAGNESIUM: CPT | Performed by: INTERNAL MEDICINE

## 2021-12-08 PROCEDURE — 96366 THER/PROPH/DIAG IV INF ADDON: CPT

## 2021-12-08 PROCEDURE — 85025 COMPLETE CBC W/AUTO DIFF WBC: CPT | Performed by: INTERNAL MEDICINE

## 2021-12-08 RX ORDER — DIPHENHYDRAMINE HCL 25 MG
50 CAPSULE ORAL ONCE AS NEEDED
Status: CANCELLED
Start: 2022-01-05

## 2021-12-08 RX ORDER — METHYLPREDNISOLONE SODIUM SUCCINATE 125 MG/2ML
50 INJECTION, POWDER, LYOPHILIZED, FOR SOLUTION INTRAMUSCULAR; INTRAVENOUS ONCE AS NEEDED
Status: CANCELLED
Start: 2022-01-05

## 2021-12-08 RX ORDER — DIPHENHYDRAMINE HCL 25 MG
50 CAPSULE ORAL ONCE
Status: DISCONTINUED | OUTPATIENT
Start: 2021-12-08 | End: 2021-12-10 | Stop reason: HOSPADM

## 2021-12-08 RX ORDER — PREDNISONE 20 MG/1
40 TABLET ORAL ONCE
Status: CANCELLED
Start: 2022-01-05 | End: 2022-01-05

## 2021-12-08 RX ORDER — CLONAZEPAM 0.5 MG/1
0.5 TABLET ORAL NIGHTLY PRN
Qty: 30 TABLET | Refills: 1 | Status: SHIPPED | OUTPATIENT
Start: 2021-12-08 | End: 2022-02-09 | Stop reason: SDUPTHER

## 2021-12-08 RX ORDER — ACETAMINOPHEN 325 MG/1
325 TABLET ORAL ONCE
Status: CANCELLED | OUTPATIENT
Start: 2022-01-05

## 2021-12-08 RX ORDER — EPINEPHRINE 1 MG/ML
0.3 INJECTION, SOLUTION INTRAMUSCULAR; SUBCUTANEOUS ONCE AS NEEDED
Status: CANCELLED
Start: 2022-01-05

## 2021-12-08 RX ORDER — EPINEPHRINE 1 MG/ML
0.3 INJECTION, SOLUTION INTRAMUSCULAR; SUBCUTANEOUS ONCE AS NEEDED
Status: DISCONTINUED | OUTPATIENT
Start: 2021-12-08 | End: 2021-12-10 | Stop reason: HOSPADM

## 2021-12-08 RX ORDER — DIPHENHYDRAMINE HCL 25 MG
50 CAPSULE ORAL ONCE AS NEEDED
Status: DISCONTINUED | OUTPATIENT
Start: 2021-12-08 | End: 2021-12-10 | Stop reason: HOSPADM

## 2021-12-08 RX ORDER — ACETAMINOPHEN 325 MG/1
325 TABLET ORAL ONCE
Status: DISCONTINUED | OUTPATIENT
Start: 2021-12-08 | End: 2021-12-10 | Stop reason: HOSPADM

## 2021-12-08 RX ORDER — SODIUM CHLORIDE 0.9 % (FLUSH) 0.9 %
10 SYRINGE (ML) INJECTION AS NEEDED
Status: DISCONTINUED | OUTPATIENT
Start: 2021-12-08 | End: 2021-12-10 | Stop reason: HOSPADM

## 2021-12-08 RX ORDER — HEPARIN SODIUM (PORCINE) LOCK FLUSH IV SOLN 100 UNIT/ML 100 UNIT/ML
500 SOLUTION INTRAVENOUS AS NEEDED
Status: DISCONTINUED | OUTPATIENT
Start: 2021-12-08 | End: 2021-12-10 | Stop reason: HOSPADM

## 2021-12-08 RX ORDER — HEPARIN SODIUM (PORCINE) LOCK FLUSH IV SOLN 100 UNIT/ML 100 UNIT/ML
500 SOLUTION INTRAVENOUS AS NEEDED
Status: CANCELLED | OUTPATIENT
Start: 2021-12-08

## 2021-12-08 RX ORDER — PREDNISONE 20 MG/1
40 TABLET ORAL ONCE
Status: DISCONTINUED | OUTPATIENT
Start: 2021-12-08 | End: 2021-12-10 | Stop reason: HOSPADM

## 2021-12-08 RX ORDER — DIPHENHYDRAMINE HCL 25 MG
50 CAPSULE ORAL ONCE
Status: CANCELLED
Start: 2022-01-05 | End: 2022-01-05

## 2021-12-08 RX ORDER — METHYLPREDNISOLONE SODIUM SUCCINATE 125 MG/2ML
50 INJECTION, POWDER, LYOPHILIZED, FOR SOLUTION INTRAMUSCULAR; INTRAVENOUS ONCE AS NEEDED
Status: DISCONTINUED | OUTPATIENT
Start: 2021-12-08 | End: 2021-12-10 | Stop reason: HOSPADM

## 2021-12-08 RX ORDER — SODIUM CHLORIDE 0.9 % (FLUSH) 0.9 %
10 SYRINGE (ML) INJECTION AS NEEDED
Status: CANCELLED | OUTPATIENT
Start: 2021-12-08

## 2021-12-08 RX ORDER — PREDNISONE 20 MG/1
40 TABLET ORAL ONCE AS NEEDED
Status: CANCELLED
Start: 2022-01-05

## 2021-12-08 RX ORDER — PREDNISONE 20 MG/1
40 TABLET ORAL ONCE AS NEEDED
Status: DISCONTINUED | OUTPATIENT
Start: 2021-12-08 | End: 2021-12-10 | Stop reason: HOSPADM

## 2021-12-08 RX ADMIN — IMMUNE GLOBULIN INFUSION (HUMAN) 20 G: 100 INJECTION, SOLUTION INTRAVENOUS; SUBCUTANEOUS at 08:52

## 2021-12-08 RX ADMIN — IMMUNE GLOBULIN INFUSION (HUMAN) 20 G: 100 INJECTION, SOLUTION INTRAVENOUS; SUBCUTANEOUS at 11:04

## 2021-12-08 RX ADMIN — SODIUM CHLORIDE, PRESERVATIVE FREE 10 ML: 5 INJECTION INTRAVENOUS at 13:32

## 2021-12-08 RX ADMIN — HEPARIN 500 UNITS: 100 SYRINGE at 13:32

## 2021-12-08 NOTE — TELEPHONE ENCOUNTER
Rx Refill Note  Requested Prescriptions     Pending Prescriptions Disp Refills   • clonazePAM (KlonoPIN) 0.5 MG tablet 30 tablet 1     Sig: Take 1 tablet by mouth At Night As Needed      Last office visit with prescribing clinician: 10/7/2021      Next office visit with prescribing clinician: 12/15/2021            CHIQUI BYRNE MA  12/08/21, 10:33 EST       CSA: 9/21/2021  UDS: N/A

## 2021-12-15 ENCOUNTER — OFFICE VISIT (OUTPATIENT)
Dept: INTERNAL MEDICINE | Facility: CLINIC | Age: 66
End: 2021-12-15

## 2021-12-15 VITALS
OXYGEN SATURATION: 97 % | TEMPERATURE: 98.2 F | WEIGHT: 244 LBS | DIASTOLIC BLOOD PRESSURE: 90 MMHG | HEART RATE: 94 BPM | RESPIRATION RATE: 16 BRPM | HEIGHT: 65 IN | SYSTOLIC BLOOD PRESSURE: 132 MMHG | BODY MASS INDEX: 40.65 KG/M2

## 2021-12-15 DIAGNOSIS — E87.1 HYPONATREMIA: Primary | ICD-10-CM

## 2021-12-15 DIAGNOSIS — K59.04 CHRONIC IDIOPATHIC CONSTIPATION: ICD-10-CM

## 2021-12-15 DIAGNOSIS — K58.1 IRRITABLE BOWEL SYNDROME WITH CONSTIPATION: ICD-10-CM

## 2021-12-15 DIAGNOSIS — R19.8 CHANGE IN BOWEL FUNCTION: ICD-10-CM

## 2021-12-15 DIAGNOSIS — M60.9 MYOSITIS, UNSPECIFIED MYOSITIS TYPE, UNSPECIFIED SITE: ICD-10-CM

## 2021-12-15 PROCEDURE — 99214 OFFICE O/P EST MOD 30 MIN: CPT | Performed by: INTERNAL MEDICINE

## 2021-12-15 RX ORDER — ORAL SEMAGLUTIDE 3 MG/1
6 TABLET ORAL DAILY
Qty: 30 TABLET | Refills: 1 | COMMUNITY
Start: 2021-12-15 | End: 2021-12-15

## 2021-12-15 RX ORDER — ORAL SEMAGLUTIDE 14 MG/1
14 TABLET ORAL DAILY
Qty: 30 TABLET | Refills: 11 | Status: SHIPPED | OUTPATIENT
Start: 2021-12-15 | End: 2022-07-14

## 2021-12-15 NOTE — PROGRESS NOTES
Subjective     Patient ID: Raquel Mariee is a 66 y.o. female. Patient is here for management of multiple medical problems.     Chief Complaint   Patient presents with   • Hyponatremia     History of Present Illness   Hyponatremia.      Asthma stable.    h pylori recently treated and feeling better.        The following portions of the patient's history were reviewed and updated as appropriate: allergies, current medications, past family history, past medical history, past social history, past surgical history and problem list.    Review of Systems   Constitutional: Negative for fatigue.   Psychiatric/Behavioral: Negative for self-injury and sleep disturbance. The patient is not nervous/anxious and is not hyperactive.    All other systems reviewed and are negative.      Current Outpatient Medications:   •  acetaminophen-codeine (TYLENOL #3) 300-30 MG per tablet, Take 1 tablet by mouth Every 4 (Four) Hours As Needed for Moderate Pain ., Disp: 10 tablet, Rfl: 0  •  albuterol sulfate  (90 Base) MCG/ACT inhaler, Inhale 2 puffs by mouth every 4-6 hours as needed for cough, wheeze, shortness of breath. Use with vortex spacer, Disp: 8.5 g, Rfl: 3  •  allopurinol (Zyloprim) 100 MG tablet, Take 1 tablet by mouth Daily., Disp: 90 tablet, Rfl: 3  •  amitriptyline (ELAVIL) 10 MG tablet, Take 2 tablets by mouth Every Evening., Disp: 90 tablet, Rfl: 3  •  Azelastine HCl 137 MCG/SPRAY solution, Instill 1-2 sprays in each nostril twice a day as needed, Disp: 30 mL, Rfl: 11  •  betamethasone valerate (VALISONE) 0.1 % cream, Apply topically to the appropriate area as directed Daily., Disp: 45 g, Rfl: 11  •  buPROPion SR (WELLBUTRIN SR) 150 MG 12 hr tablet, Take 1 tablet by mouth 2 (two) times a day., Disp: 180 tablet, Rfl: 3  •  calcium carbonate (Antacid Maximum) 1000 MG chewable tablet, Chew 500 mg 3 (Three) Times a Day., Disp: , Rfl:   •  cholecalciferol (VITAMIN D3) 25 MCG (1000 UT) tablet, Take 1,000 Units by mouth  Daily., Disp: , Rfl:   •  clonazePAM (KlonoPIN) 0.5 MG tablet, Take 1 tablet by mouth At Night As Needed, Disp: 30 tablet, Rfl: 1  •  Dextromethorphan-guaiFENesin (Mucus-DM)  MG tablet sustained-release 12 hour, Take  by mouth., Disp: , Rfl:   •  dicyclomine (BENTYL) 10 MG capsule, Take 1 capsule by mouth 3 (Three) Times a Day., Disp: 270 capsule, Rfl: 3  •  diphenhydrAMINE (Benadryl Allergy) 25 MG tablet, Take 1-2 tablets by mouth Every 4-6 Hours As Needed., Disp: 90 tablet, Rfl: 11  •  famotidine (PEPCID) 20 MG tablet, Take 20 mg by mouth 2 (Two) Times a Day., Disp: , Rfl:   •  fexofenadine (ALLEGRA) 180 MG tablet, Take 180 mg by mouth Daily., Disp: , Rfl:   •  fluticasone (FLONASE) 50 MCG/ACT nasal spray, Instill 2 sprays into each nostril daily., Disp: 16 g, Rfl: 11  •  fluticasone-salmeterol (Advair HFA) 230-21 MCG/ACT inhaler, Inhale 2 puffs by mouth 2 (Two) Times a Day., Disp: 12 g, Rfl: 11  •  folic acid (FOLVITE) 1 MG tablet, Take 1 tablet by mouth Daily., Disp: 90 tablet, Rfl: 3  •  furosemide (LASIX) 40 MG tablet, Take 1 tablet by mouth Daily., Disp: 90 tablet, Rfl: 3  •  ipratropium (ATROVENT) 0.06 % nasal spray, Spray 2 sprays into the nostril(s) every night at bedtime as directed by provider, Disp: 15 mL, Rfl: 12  •  ipratropium-albuterol (DUO-NEB) 0.5-2.5 mg/3 ml nebulizer, Inhale contents of 1 vial (3mL) via nebulizer every 4-6 hours as needed for coughing, wheezing, or shortness of breath., Disp: 300 mL, Rfl: 3  •  ipratropium-albuterol (DUO-NEB) 0.5-2.5 mg/3 ml nebulizer, Inhale 1 vial (3 mL) by nebulization every 4-6 hours as needed for cough, wheezing, and shortness of breath, Disp: 270 mL, Rfl: 3  •  isosorbide dinitrate (ISORDIL) 10 MG tablet, Take 1 tablet by mouth 3 (Three) Times a Day., Disp: 270 tablet, Rfl: 3  •  levothyroxine (SYNTHROID, LEVOTHROID) 50 MCG tablet, Take 1 tablet by mouth Daily., Disp: 90 tablet, Rfl: 3  •  Magnesium 250 MG tablet, Take 500 mg by mouth 2 (Two) Times a  Day., Disp: , Rfl:   •  melatonin 5 MG tablet tablet, Take 5 mg by mouth Every Night., Disp: , Rfl:   •  montelukast (SINGULAIR) 10 MG tablet, Take 1 tablet by mouth every night at bedtime., Disp: 30 tablet, Rfl: 11  •  multivitamin with minerals (ONE-A-DAY PROACTIVE 65+ PO), Take 1 tablet by mouth Daily., Disp: , Rfl:   •  omeprazole (priLOSEC) 40 MG capsule, Take 1 capsule by mouth 2 (two) times a day., Disp: 180 capsule, Rfl: 3  •  ondansetron (ZOFRAN) 4 MG tablet, Take 1 tablet by mouth Every 8 (Eight) Hours As Needed for Nausea or Vomiting., Disp: 30 tablet, Rfl: 11  •  potassium citrate (UROCIT-K) 10 MEQ (1080 MG) CR tablet, Take 1 tablet by mouth Daily., Disp: 90 tablet, Rfl: 3  •  promethazine-dextromethorphan (PROMETHAZINE-DM) 6.25-15 MG/5ML syrup, Take 5 mL by mouth 4 (Four) Times a Day As Needed for Cough., Disp: 240 mL, Rfl: 0  •  Spacer/Aero-Holding Chambers (AeroChamber Plus Wali-Vu) misc, Use as directed with albuterol inhaler, Disp: 1 each, Rfl: 0  •  sucralfate (Carafate) 1 g tablet, Take 1 tablet by mouth 4 (Four) Times a Day. (Patient taking differently: Take 1 g by mouth 4 (Four) Times a Day As Needed.), Disp: 120 tablet, Rfl: 1  •  tiotropium bromide monohydrate (SPIRIVA RESPIMAT) 2.5 MCG/ACT aerosol solution inhaler, Inhale 2 puffs by mouth daily, Disp: 4 g, Rfl: 11  •  tiZANidine (ZANAFLEX) 4 MG tablet, Take 1 tablet by mouth Every Night., Disp: 90 tablet, Rfl: 1  •  ubrogepant (ubrogepant) 100 MG tablet, Take 1 tablet by mouth once per day as needed at onset of migraine, Disp: 10 tablet, Rfl: 11  •  valsartan (DIOVAN) 160 MG tablet, Take 1 tablet by mouth Daily., Disp: 90 tablet, Rfl: 3  •  vitamin B-12 (CYANOCOBALAMIN) 1000 MCG tablet, Take 1,000 mcg by mouth Daily., Disp: , Rfl:   •  Semaglutide (Rybelsus) 14 MG tablet, Take 1 tablet by mouth Daily., Disp: 30 tablet, Rfl: 11    Current Facility-Administered Medications:   •  heparin injection 500 Units, 5 mL, Intravenous, PRN, Sanjeev Rawls  "MD [place]  •  sodium chloride 0.9 % flush 10 mL, 10 mL, Intravenous, Q12H, Sanjeev Rawls MD  •  sodium chloride 0.9 % flush 10 mL, 10 mL, Intravenous, PRN, Sanjeev Rawls MD  •  sodium chloride 0.9 % flush 20 mL, 20 mL, Intravenous, PRN, Sanjeev Rawls MD    Objective      Blood pressure 132/90, pulse 94, temperature 98.2 °F (36.8 °C), resp. rate 16, height 165.1 cm (65\"), weight 111 kg (244 lb), SpO2 97 %, not currently breastfeeding.    Physical Exam     General Appearance:    Alert, cooperative, no distress, appears stated age   Head:    Normocephalic, without obvious abnormality, atraumatic   Eyes:    PERRL, conjunctiva/corneas clear, EOM's intact   Ears:    Normal TM's and external ear canals, both ears   Nose:   Nares normal, septum midline, mucosa normal, no drainage   or sinus tenderness   Throat:   Lips, mucosa, and tongue normal; teeth and gums normal   Neck:   Supple, symmetrical, trachea midline, no adenopathy;        thyroid:  No enlargement/tenderness/nodules; no carotid    bruit or JVD   Back:     Symmetric, no curvature, ROM normal, no CVA tenderness   Lungs:     Clear to auscultation bilaterally, respirations unlabored   Chest wall:    No tenderness or deformity   Heart:    Regular rate and rhythm, S1 and S2 normal, no murmur,        rub or gallop   Abdomen:     Soft, non-tender, bowel sounds active all four quadrants,     no masses, no organomegaly   Extremities:   Extremities normal, atraumatic, no cyanosis or edema   Pulses:   2+ and symmetric all extremities   Skin:   Skin color, texture, turgor normal, no rashes or lesions   Lymph nodes:   Cervical, supraclavicular, and axillary nodes normal   Neurologic:   CNII-XII intact. Normal strength, sensation and reflexes       throughout      Results for orders placed or performed during the hospital encounter of 12/08/21   CBC Auto Differential    Specimen: Blood   Result Value Ref Range    WBC 4.44 3.40 - 10.80 10*3/mm3    RBC 3.77 3.77 - 5.28 " 10*6/mm3    Hemoglobin 12.6 12.0 - 15.9 g/dL    Hematocrit 36.3 34.0 - 46.6 %    MCV 96.3 79.0 - 97.0 fL    MCH 33.4 (H) 26.6 - 33.0 pg    MCHC 34.7 31.5 - 35.7 g/dL    RDW 13.3 12.3 - 15.4 %    RDW-SD 47.0 37.0 - 54.0 fl    MPV 8.9 6.0 - 12.0 fL    Platelets 241 140 - 450 10*3/mm3    Neutrophil % 55.5 42.7 - 76.0 %    Lymphocyte % 28.8 19.6 - 45.3 %    Monocyte % 10.6 5.0 - 12.0 %    Eosinophil % 3.2 0.3 - 6.2 %    Basophil % 1.4 0.0 - 1.5 %    Immature Grans % 0.5 0.0 - 0.5 %    Neutrophils, Absolute 2.47 1.70 - 7.00 10*3/mm3    Lymphocytes, Absolute 1.28 0.70 - 3.10 10*3/mm3    Monocytes, Absolute 0.47 0.10 - 0.90 10*3/mm3    Eosinophils, Absolute 0.14 0.00 - 0.40 10*3/mm3    Basophils, Absolute 0.06 0.00 - 0.20 10*3/mm3    Immature Grans, Absolute 0.02 0.00 - 0.05 10*3/mm3    nRBC 0.0 0.0 - 0.2 /100 WBC   Comprehensive Metabolic Panel    Specimen: Blood   Result Value Ref Range    Glucose 120 (H) 65 - 99 mg/dL    BUN 13 8 - 23 mg/dL    Creatinine 1.27 (H) 0.57 - 1.00 mg/dL    Sodium 131 (L) 136 - 145 mmol/L    Potassium 4.1 3.5 - 5.2 mmol/L    Chloride 97 (L) 98 - 107 mmol/L    CO2 22.8 22.0 - 29.0 mmol/L    Calcium 9.2 8.6 - 10.5 mg/dL    Total Protein 7.4 6.0 - 8.5 g/dL    Albumin 4.60 3.50 - 5.20 g/dL    ALT (SGPT) 21 1 - 33 U/L    AST (SGOT) 29 1 - 32 U/L    Alkaline Phosphatase 75 39 - 117 U/L    Total Bilirubin 0.3 0.0 - 1.2 mg/dL    eGFR Non African Amer 42 (L) >60 mL/min/1.73    Globulin 2.8 gm/dL    A/G Ratio 1.6 g/dL    BUN/Creatinine Ratio 10.2 7.0 - 25.0    Anion Gap 11.2 5.0 - 15.0 mmol/L   Magnesium    Specimen: Blood   Result Value Ref Range    Magnesium 2.2 1.6 - 2.4 mg/dL   CK    Specimen: Blood   Result Value Ref Range    Creatine Kinase 218 (H) 20 - 180 U/L   Myoglobin, Serum    Specimen: Blood   Result Value Ref Range    Myoglobin 91.5 (H) 25.0 - 58.0 ng/mL   Uric Acid    Specimen: Blood   Result Value Ref Range    Uric Acid 3.9 2.4 - 5.7 mg/dL         Assessment/Plan   Constipation. Overdue  for colonoscopy. Polyps in the past. Other causes elavil, zanaflex, T#3.  Pt not on the latter 2 regularly. Will try to stop elavil.      Diagnoses and all orders for this visit:    1. Hyponatremia (Primary)    2. Myositis, unspecified myositis type, unspecified site    3. Irritable bowel syndrome with constipation    4. Chronic idiopathic constipation    5. Change in bowel function  -     Ambulatory Referral to Gastroenterology    Other orders  -     Discontinue: Semaglutide (Rybelsus) 3 MG tablet; Take 2 tablets by mouth Daily.  Dispense: 30 tablet; Refill: 1  -     Semaglutide (Rybelsus) 14 MG tablet; Take 1 tablet by mouth Daily.  Dispense: 30 tablet; Refill: 11      Return in about 3 months (around 3/15/2022).          There are no Patient Instructions on file for this visit.     Sanjeev Rawls MD    Assessment/Plan

## 2021-12-17 DIAGNOSIS — E11.9 TYPE 2 DIABETES MELLITUS WITHOUT COMPLICATION, WITHOUT LONG-TERM CURRENT USE OF INSULIN (HCC): Primary | ICD-10-CM

## 2021-12-21 ENCOUNTER — HOSPITAL ENCOUNTER (OUTPATIENT)
Dept: ULTRASOUND IMAGING | Facility: HOSPITAL | Age: 66
End: 2021-12-21

## 2021-12-23 RX ORDER — AZELASTINE HYDROCHLORIDE 137 UG/1
SPRAY, METERED NASAL
Qty: 30 ML | Refills: 11 | Status: SHIPPED | OUTPATIENT
Start: 2021-12-23 | End: 2022-08-10

## 2021-12-27 ENCOUNTER — HOSPITAL ENCOUNTER (OUTPATIENT)
Dept: ULTRASOUND IMAGING | Facility: HOSPITAL | Age: 66
Discharge: HOME | End: 2021-12-27
Admitting: OBSTETRICS & GYNECOLOGY

## 2021-12-27 DIAGNOSIS — N63.10 LUMP OF RIGHT BREAST: ICD-10-CM

## 2021-12-27 PROCEDURE — 76641 ULTRASOUND BREAST COMPLETE: CPT

## 2021-12-30 RX ORDER — FLUTICASONE PROPIONATE AND SALMETEROL XINAFOATE 230; 21 UG/1; UG/1
AEROSOL, METERED RESPIRATORY (INHALATION)
Qty: 12 G | Refills: 11 | Status: SHIPPED | OUTPATIENT
Start: 2021-12-30 | End: 2022-08-03 | Stop reason: SDUPTHER

## 2022-01-01 ENCOUNTER — EXTERNAL RECORD (OUTPATIENT)
Dept: OTHER | Age: 67
End: 2022-01-01

## 2022-01-17 ENCOUNTER — HOSPITAL ENCOUNTER (OUTPATIENT)
Dept: INFUSION THERAPY | Facility: HOSPITAL | Age: 67
Setting detail: INFUSION SERIES
Discharge: HOME | End: 2022-01-17

## 2022-01-17 VITALS
RESPIRATION RATE: 18 BRPM | HEART RATE: 76 BPM | SYSTOLIC BLOOD PRESSURE: 140 MMHG | DIASTOLIC BLOOD PRESSURE: 67 MMHG | OXYGEN SATURATION: 97 % | TEMPERATURE: 98.4 F | WEIGHT: 235 LBS | BODY MASS INDEX: 39.11 KG/M2

## 2022-01-17 DIAGNOSIS — Z12.11 SCREENING FOR COLON CANCER: Primary | ICD-10-CM

## 2022-01-17 DIAGNOSIS — D83.9 COMMON VARIABLE IMMUNODEFICIENCY: Primary | ICD-10-CM

## 2022-01-17 DIAGNOSIS — D80.1 COMMON VARIABLE AGAMMAGLOBULINEMIA: ICD-10-CM

## 2022-01-17 DIAGNOSIS — Z95.828 PORT-A-CATH IN PLACE: ICD-10-CM

## 2022-01-17 LAB
ALBUMIN SERPL-MCNC: 4.5 G/DL (ref 3.5–5.2)
ALBUMIN/GLOB SERPL: 1.9 G/DL
ALP SERPL-CCNC: 74 U/L (ref 39–117)
ALT SERPL W P-5'-P-CCNC: 18 U/L (ref 1–33)
ANION GAP SERPL CALCULATED.3IONS-SCNC: 10.9 MMOL/L (ref 5–15)
AST SERPL-CCNC: 23 U/L (ref 1–32)
BASOPHILS # BLD AUTO: 0.05 10*3/MM3 (ref 0–0.2)
BASOPHILS NFR BLD AUTO: 1.2 % (ref 0–1.5)
BILIRUB SERPL-MCNC: 0.4 MG/DL (ref 0–1.2)
BUN SERPL-MCNC: 12 MG/DL (ref 8–23)
BUN/CREAT SERPL: 9.4 (ref 7–25)
CALCIUM SPEC-SCNC: 9.3 MG/DL (ref 8.6–10.5)
CHLORIDE SERPL-SCNC: 98 MMOL/L (ref 98–107)
CO2 SERPL-SCNC: 24.1 MMOL/L (ref 22–29)
CREAT SERPL-MCNC: 1.28 MG/DL (ref 0.57–1)
DEPRECATED RDW RBC AUTO: 44.3 FL (ref 37–54)
EOSINOPHIL # BLD AUTO: 0.12 10*3/MM3 (ref 0–0.4)
EOSINOPHIL NFR BLD AUTO: 2.8 % (ref 0.3–6.2)
ERYTHROCYTE [DISTWIDTH] IN BLOOD BY AUTOMATED COUNT: 12.6 % (ref 12.3–15.4)
GFR SERPL CREATININE-BSD FRML MDRD: 42 ML/MIN/1.73
GLOBULIN UR ELPH-MCNC: 2.4 GM/DL
GLUCOSE SERPL-MCNC: 107 MG/DL (ref 65–99)
HCT VFR BLD AUTO: 38.2 % (ref 34–46.6)
HGB BLD-MCNC: 12.9 G/DL (ref 12–15.9)
IGG1 SER-MCNC: 937 MG/DL (ref 700–1600)
IMM GRANULOCYTES # BLD AUTO: 0.02 10*3/MM3 (ref 0–0.05)
IMM GRANULOCYTES NFR BLD AUTO: 0.5 % (ref 0–0.5)
LYMPHOCYTES # BLD AUTO: 1.09 10*3/MM3 (ref 0.7–3.1)
LYMPHOCYTES NFR BLD AUTO: 25.1 % (ref 19.6–45.3)
MCH RBC QN AUTO: 32.7 PG (ref 26.6–33)
MCHC RBC AUTO-ENTMCNC: 33.8 G/DL (ref 31.5–35.7)
MCV RBC AUTO: 96.7 FL (ref 79–97)
MONOCYTES # BLD AUTO: 0.42 10*3/MM3 (ref 0.1–0.9)
MONOCYTES NFR BLD AUTO: 9.7 % (ref 5–12)
NEUTROPHILS NFR BLD AUTO: 2.64 10*3/MM3 (ref 1.7–7)
NEUTROPHILS NFR BLD AUTO: 60.7 % (ref 42.7–76)
NRBC BLD AUTO-RTO: 0 /100 WBC (ref 0–0.2)
PLATELET # BLD AUTO: 231 10*3/MM3 (ref 140–450)
PMV BLD AUTO: 8.4 FL (ref 6–12)
POTASSIUM SERPL-SCNC: 4.4 MMOL/L (ref 3.5–5.2)
PROT SERPL-MCNC: 6.9 G/DL (ref 6–8.5)
RBC # BLD AUTO: 3.95 10*6/MM3 (ref 3.77–5.28)
SODIUM SERPL-SCNC: 133 MMOL/L (ref 136–145)
WBC NRBC COR # BLD: 4.34 10*3/MM3 (ref 3.4–10.8)

## 2022-01-17 PROCEDURE — 80053 COMPREHEN METABOLIC PANEL: CPT | Performed by: ALLERGY & IMMUNOLOGY

## 2022-01-17 PROCEDURE — 25010000002 IMMUNE GLOBULIN (HUMAN) 20 GM/200ML SOLUTION: Performed by: ALLERGY & IMMUNOLOGY

## 2022-01-17 PROCEDURE — 96366 THER/PROPH/DIAG IV INF ADDON: CPT

## 2022-01-17 PROCEDURE — 96365 THER/PROPH/DIAG IV INF INIT: CPT

## 2022-01-17 PROCEDURE — 25010000002 HEPARIN LOCK FLUSH PER 10 UNITS: Performed by: ALLERGY & IMMUNOLOGY

## 2022-01-17 PROCEDURE — 82784 ASSAY IGA/IGD/IGG/IGM EACH: CPT | Performed by: ALLERGY & IMMUNOLOGY

## 2022-01-17 PROCEDURE — 36591 DRAW BLOOD OFF VENOUS DEVICE: CPT

## 2022-01-17 PROCEDURE — 85025 COMPLETE CBC W/AUTO DIFF WBC: CPT | Performed by: ALLERGY & IMMUNOLOGY

## 2022-01-17 RX ORDER — ACETAMINOPHEN 325 MG/1
325 TABLET ORAL ONCE
Status: DISCONTINUED | OUTPATIENT
Start: 2022-01-17 | End: 2022-01-19 | Stop reason: HOSPADM

## 2022-01-17 RX ORDER — DIPHENHYDRAMINE HCL 25 MG
50 CAPSULE ORAL ONCE
Status: CANCELLED
Start: 2022-01-31 | End: 2022-01-31

## 2022-01-17 RX ORDER — ACETAMINOPHEN 325 MG/1
325 TABLET ORAL ONCE
Status: CANCELLED | OUTPATIENT
Start: 2022-01-31

## 2022-01-17 RX ORDER — PREDNISONE 20 MG/1
40 TABLET ORAL ONCE
Status: CANCELLED
Start: 2022-01-31 | End: 2022-01-31

## 2022-01-17 RX ORDER — DIPHENHYDRAMINE HCL 25 MG
50 CAPSULE ORAL ONCE
Status: DISCONTINUED | OUTPATIENT
Start: 2022-01-17 | End: 2022-01-19 | Stop reason: HOSPADM

## 2022-01-17 RX ORDER — HEPARIN SODIUM (PORCINE) LOCK FLUSH IV SOLN 100 UNIT/ML 100 UNIT/ML
500 SOLUTION INTRAVENOUS AS NEEDED
Status: DISCONTINUED | OUTPATIENT
Start: 2022-01-17 | End: 2022-01-19 | Stop reason: HOSPADM

## 2022-01-17 RX ORDER — METHYLPREDNISOLONE SODIUM SUCCINATE 125 MG/2ML
50 INJECTION, POWDER, LYOPHILIZED, FOR SOLUTION INTRAMUSCULAR; INTRAVENOUS ONCE AS NEEDED
Status: CANCELLED
Start: 2022-01-31

## 2022-01-17 RX ORDER — PREDNISONE 20 MG/1
40 TABLET ORAL ONCE AS NEEDED
Status: CANCELLED
Start: 2022-01-31

## 2022-01-17 RX ORDER — SODIUM CHLORIDE 0.9 % (FLUSH) 0.9 %
10 SYRINGE (ML) INJECTION AS NEEDED
Status: CANCELLED | OUTPATIENT
Start: 2022-01-17

## 2022-01-17 RX ORDER — HEPARIN SODIUM (PORCINE) LOCK FLUSH IV SOLN 100 UNIT/ML 100 UNIT/ML
500 SOLUTION INTRAVENOUS AS NEEDED
Status: CANCELLED | OUTPATIENT
Start: 2022-01-17

## 2022-01-17 RX ORDER — DIPHENHYDRAMINE HCL 25 MG
50 CAPSULE ORAL ONCE AS NEEDED
Status: CANCELLED
Start: 2022-01-31

## 2022-01-17 RX ORDER — SODIUM, POTASSIUM,MAG SULFATES 17.5-3.13G
1 SOLUTION, RECONSTITUTED, ORAL ORAL EVERY 12 HOURS
Qty: 354 ML | Refills: 0 | Status: SHIPPED | OUTPATIENT
Start: 2022-01-17 | End: 2022-07-14

## 2022-01-17 RX ORDER — SODIUM CHLORIDE 0.9 % (FLUSH) 0.9 %
10 SYRINGE (ML) INJECTION AS NEEDED
Status: DISCONTINUED | OUTPATIENT
Start: 2022-01-17 | End: 2022-01-19 | Stop reason: HOSPADM

## 2022-01-17 RX ORDER — EPINEPHRINE 1 MG/ML
0.3 INJECTION, SOLUTION INTRAMUSCULAR; SUBCUTANEOUS ONCE AS NEEDED
Status: CANCELLED
Start: 2022-01-31

## 2022-01-17 RX ADMIN — HEPARIN 500 UNITS: 100 SYRINGE at 14:45

## 2022-01-17 RX ADMIN — SODIUM CHLORIDE 250 ML: 9 INJECTION, SOLUTION INTRAVENOUS at 10:13

## 2022-01-17 RX ADMIN — IMMUNE GLOBULIN INFUSION (HUMAN) 20 G: 100 INJECTION, SOLUTION INTRAVENOUS; SUBCUTANEOUS at 12:25

## 2022-01-17 RX ADMIN — Medication 10 ML: at 14:43

## 2022-01-17 RX ADMIN — IMMUNE GLOBULIN INFUSION (HUMAN) 20 G: 100 INJECTION, SOLUTION INTRAVENOUS; SUBCUTANEOUS at 09:06

## 2022-01-17 NOTE — CODE DOCUMENTATION
bp 77/39 pt drowsy and has slurred speech. IVIG infusion stopped and NS 250ml bolus started.  office called to inform them of this.

## 2022-01-20 ENCOUNTER — OUTSIDE FACILITY SERVICE (OUTPATIENT)
Dept: GASTROENTEROLOGY | Facility: CLINIC | Age: 67
End: 2022-01-20

## 2022-01-20 PROCEDURE — 45385 COLONOSCOPY W/LESION REMOVAL: CPT | Performed by: INTERNAL MEDICINE

## 2022-01-20 PROCEDURE — 88305 TISSUE EXAM BY PATHOLOGIST: CPT | Performed by: INTERNAL MEDICINE

## 2022-01-21 ENCOUNTER — LAB REQUISITION (OUTPATIENT)
Dept: LAB | Facility: HOSPITAL | Age: 67
End: 2022-01-21

## 2022-01-21 DIAGNOSIS — Z86.010 PERSONAL HISTORY OF COLONIC POLYPS: ICD-10-CM

## 2022-01-21 DIAGNOSIS — R19.8 OTHER SPECIFIED SYMPTOMS AND SIGNS INVOLVING THE DIGESTIVE SYSTEM AND ABDOMEN: ICD-10-CM

## 2022-01-21 DIAGNOSIS — Z83.71 FAMILY HISTORY OF COLONIC POLYPS: ICD-10-CM

## 2022-01-21 DIAGNOSIS — D12.3 BENIGN NEOPLASM OF TRANSVERSE COLON: ICD-10-CM

## 2022-01-21 DIAGNOSIS — K57.30 DIVERTICULOSIS OF LARGE INTESTINE WITHOUT PERFORATION OR ABSCESS WITHOUT BLEEDING: ICD-10-CM

## 2022-01-21 DIAGNOSIS — K64.8 OTHER HEMORRHOIDS: ICD-10-CM

## 2022-01-24 LAB
CYTO UR: NORMAL
LAB AP CASE REPORT: NORMAL
LAB AP CLINICAL INFORMATION: NORMAL
PATH REPORT.FINAL DX SPEC: NORMAL
PATH REPORT.GROSS SPEC: NORMAL

## 2022-01-25 ENCOUNTER — TELEPHONE (OUTPATIENT)
Dept: GASTROENTEROLOGY | Facility: CLINIC | Age: 67
End: 2022-01-25

## 2022-01-25 NOTE — TELEPHONE ENCOUNTER
----- Message from Dandre Amador MD sent at 1/24/2022  3:56 PM EST -----  Let Ms. Mariee know there was 1 adenoma type polyp.  She will need a repeat examination in 5 years.

## 2022-01-29 PROCEDURE — U0004 COV-19 TEST NON-CDC HGH THRU: HCPCS | Performed by: EMERGENCY MEDICINE

## 2022-02-03 RX ORDER — VALSARTAN 160 MG/1
160 TABLET ORAL DAILY
Qty: 90 TABLET | Refills: 3 | Status: SHIPPED | OUTPATIENT
Start: 2022-02-03 | End: 2023-01-30 | Stop reason: SDUPTHER

## 2022-02-08 DIAGNOSIS — F32.0 CURRENT MILD EPISODE OF MAJOR DEPRESSIVE DISORDER, UNSPECIFIED WHETHER RECURRENT: Chronic | ICD-10-CM

## 2022-02-08 DIAGNOSIS — F41.9 ANXIETY: ICD-10-CM

## 2022-02-08 RX ORDER — CLONAZEPAM 0.5 MG/1
0.5 TABLET ORAL NIGHTLY PRN
Qty: 30 TABLET | Refills: 1 | Status: CANCELLED | OUTPATIENT
Start: 2022-02-08

## 2022-02-08 RX ORDER — BUPROPION HYDROCHLORIDE 150 MG/1
150 TABLET, EXTENDED RELEASE ORAL
Qty: 180 TABLET | Refills: 3 | Status: CANCELLED | OUTPATIENT
Start: 2022-02-08

## 2022-02-09 DIAGNOSIS — F32.0 CURRENT MILD EPISODE OF MAJOR DEPRESSIVE DISORDER, UNSPECIFIED WHETHER RECURRENT: Chronic | ICD-10-CM

## 2022-02-09 DIAGNOSIS — F41.9 ANXIETY: ICD-10-CM

## 2022-02-09 RX ORDER — BUPROPION HYDROCHLORIDE 150 MG/1
150 TABLET, EXTENDED RELEASE ORAL 2 TIMES DAILY
Qty: 180 TABLET | Refills: 3 | Status: SHIPPED | OUTPATIENT
Start: 2022-02-09 | End: 2023-02-14 | Stop reason: SDUPTHER

## 2022-02-09 RX ORDER — CLONAZEPAM 0.5 MG/1
0.5 TABLET ORAL NIGHTLY PRN
Qty: 30 TABLET | Refills: 1 | Status: SHIPPED | OUTPATIENT
Start: 2022-02-09 | End: 2022-04-08 | Stop reason: SDUPTHER

## 2022-02-12 ENCOUNTER — OFFICE VISIT (OUTPATIENT)
Dept: INTERNAL MEDICINE | Facility: CLINIC | Age: 67
End: 2022-02-12

## 2022-02-12 VITALS
TEMPERATURE: 98.7 F | HEART RATE: 94 BPM | DIASTOLIC BLOOD PRESSURE: 96 MMHG | HEIGHT: 64 IN | WEIGHT: 233 LBS | RESPIRATION RATE: 16 BRPM | SYSTOLIC BLOOD PRESSURE: 138 MMHG | OXYGEN SATURATION: 94 % | BODY MASS INDEX: 39.78 KG/M2

## 2022-02-12 DIAGNOSIS — E11.9 TYPE 2 DIABETES MELLITUS WITHOUT COMPLICATION, WITH LONG-TERM CURRENT USE OF INSULIN: ICD-10-CM

## 2022-02-12 DIAGNOSIS — J32.1 FRONTAL SINUSITIS, UNSPECIFIED CHRONICITY: Primary | ICD-10-CM

## 2022-02-12 DIAGNOSIS — Z79.4 TYPE 2 DIABETES MELLITUS WITHOUT COMPLICATION, WITH LONG-TERM CURRENT USE OF INSULIN: ICD-10-CM

## 2022-02-12 PROCEDURE — 99214 OFFICE O/P EST MOD 30 MIN: CPT | Performed by: INTERNAL MEDICINE

## 2022-02-12 RX ORDER — PREDNISONE 20 MG/1
TABLET ORAL
COMMUNITY
End: 2022-08-10 | Stop reason: SDUPTHER

## 2022-02-12 RX ORDER — DOXYCYCLINE HYCLATE 100 MG/1
100 CAPSULE ORAL 2 TIMES DAILY
Qty: 20 CAPSULE | Refills: 0 | Status: SHIPPED | OUTPATIENT
Start: 2022-02-12 | End: 2022-02-22

## 2022-02-12 RX ORDER — DOXYCYCLINE HYCLATE 100 MG/1
100 CAPSULE ORAL 2 TIMES DAILY
Qty: 20 CAPSULE | Refills: 0 | Status: SHIPPED | OUTPATIENT
Start: 2022-02-12 | End: 2022-02-12 | Stop reason: SDUPTHER

## 2022-02-12 NOTE — PROGRESS NOTES
Subjective     Patient ID: Raquel Mariee is a 66 y.o. female. Patient is here for management of multiple medical problems.     Chief Complaint   Patient presents with   • Nausea   • Fatigue   • Headache     History of Present Illness   Nausea    Fatigue.  Cough    Stopped elevil and lasix.   Still on zanaflex.    Rybelsuse with nausea.      Sinusitis. recently treated got better and now back.          The following portions of the patient's history were reviewed and updated as appropriate: allergies, current medications, past family history, past medical history, past social history, past surgical history and problem list.    Review of Systems    Current Outpatient Medications:   •  acetaminophen-codeine (TYLENOL #3) 300-30 MG per tablet, Take 1 tablet by mouth Every 4 (Four) Hours As Needed for Moderate Pain ., Disp: 10 tablet, Rfl: 0  •  albuterol sulfate  (90 Base) MCG/ACT inhaler, Inhale 2 puffs by mouth every 4-6 hours as needed for cough, wheeze, shortness of breath. Use with vortex spacer, Disp: 8.5 g, Rfl: 3  •  allopurinol (Zyloprim) 100 MG tablet, Take 1 tablet by mouth Daily., Disp: 90 tablet, Rfl: 3  •  Azelastine HCl 137 MCG/SPRAY solution, Instill 1-2 sprays in each nostril twice a day as needed, Disp: 30 mL, Rfl: 11  •  betamethasone valerate (VALISONE) 0.1 % cream, Apply topically to the appropriate area as directed Daily., Disp: 45 g, Rfl: 11  •  buPROPion SR (WELLBUTRIN SR) 150 MG 12 hr tablet, Take 1 tablet by mouth 2 (Two) Times a Day., Disp: 180 tablet, Rfl: 3  •  calcium carbonate (Antacid Maximum) 1000 MG chewable tablet, Chew 500 mg 3 (Three) Times a Day., Disp: , Rfl:   •  cholecalciferol (VITAMIN D3) 25 MCG (1000 UT) tablet, Take 1,000 Units by mouth Daily., Disp: , Rfl:   •  clonazePAM (KlonoPIN) 0.5 MG tablet, Take 1 tablet by mouth At Night As Needed, Disp: 30 tablet, Rfl: 1  •  Dextromethorphan-guaiFENesin (Mucus-DM)  MG tablet sustained-release 12 hour, Take  by  mouth., Disp: , Rfl:   •  dicyclomine (BENTYL) 10 MG capsule, Take 1 capsule by mouth 3 (Three) Times a Day., Disp: 270 capsule, Rfl: 3  •  diphenhydrAMINE (Benadryl Allergy) 25 MG tablet, Take 1-2 tablets by mouth Every 4-6 Hours As Needed., Disp: 90 tablet, Rfl: 11  •  famotidine (PEPCID) 20 MG tablet, Take 20 mg by mouth 2 (Two) Times a Day., Disp: , Rfl:   •  fexofenadine (ALLEGRA) 180 MG tablet, Take 180 mg by mouth Daily., Disp: , Rfl:   •  fluticasone (FLONASE) 50 MCG/ACT nasal spray, Instill 2 sprays into each nostril daily., Disp: 16 g, Rfl: 11  •  fluticasone-salmeterol (Advair HFA) 230-21 MCG/ACT inhaler, Inhale 2 puffs by mouth 2 (Two) Times a Day., Disp: 12 g, Rfl: 11  •  furosemide (LASIX) 40 MG tablet, Take 1 tablet by mouth Daily., Disp: 90 tablet, Rfl: 3  •  ipratropium (ATROVENT) 0.06 % nasal spray, Spray 2 sprays into the nostril(s) every night at bedtime as directed by provider, Disp: 15 mL, Rfl: 12  •  ipratropium-albuterol (DUO-NEB) 0.5-2.5 mg/3 ml nebulizer, Inhale contents of 1 vial (3mL) via nebulizer every 4-6 hours as needed for coughing, wheezing, or shortness of breath., Disp: 300 mL, Rfl: 3  •  ipratropium-albuterol (DUO-NEB) 0.5-2.5 mg/3 ml nebulizer, Inhale 1 vial (3 mL) by nebulization every 4-6 hours as needed for cough, wheezing, and shortness of breath, Disp: 270 mL, Rfl: 3  •  isosorbide dinitrate (ISORDIL) 10 MG tablet, Take 1 tablet by mouth 3 (Three) Times a Day., Disp: 270 tablet, Rfl: 3  •  levothyroxine (SYNTHROID, LEVOTHROID) 50 MCG tablet, Take 1 tablet by mouth Daily., Disp: 90 tablet, Rfl: 3  •  Magnesium 250 MG tablet, Take 500 mg by mouth 2 (Two) Times a Day., Disp: , Rfl:   •  melatonin 5 MG tablet tablet, Take 5 mg by mouth Every Night., Disp: , Rfl:   •  montelukast (SINGULAIR) 10 MG tablet, Take 1 tablet by mouth every night at bedtime., Disp: 30 tablet, Rfl: 11  •  multivitamin with minerals (ONE-A-DAY PROACTIVE 65+ PO), Take 1 tablet by mouth Daily., Disp: ,  Rfl:   •  omeprazole (priLOSEC) 40 MG capsule, Take 1 capsule by mouth 2 (two) times a day., Disp: 180 capsule, Rfl: 3  •  ondansetron (ZOFRAN) 4 MG tablet, Take 1 tablet by mouth Every 8 (Eight) Hours As Needed for Nausea or Vomiting., Disp: 30 tablet, Rfl: 11  •  potassium citrate (UROCIT-K) 10 MEQ (1080 MG) CR tablet, Take 1 tablet by mouth Daily., Disp: 90 tablet, Rfl: 3  •  predniSONE (DELTASONE) 20 MG tablet, With Gamma globulin infusion -monthly, Disp: , Rfl:   •  promethazine-dextromethorphan (PROMETHAZINE-DM) 6.25-15 MG/5ML syrup, Take 5 mL by mouth 4 (Four) Times a Day As Needed for Cough., Disp: 240 mL, Rfl: 0  •  Semaglutide (Rybelsus) 14 MG tablet, Take 1 tablet by mouth Daily., Disp: 30 tablet, Rfl: 11  •  sodium-potassium-magnesium sulfates (Suprep Bowel Prep Kit) 17.5-3.13-1.6 GM/177ML solution oral solution, Take 1 bottle by mouth Every 12 (Twelve) Hours., Disp: 354 mL, Rfl: 0  •  Spacer/Aero-Holding Chambers (AeroChamber Plus Wali-Vu) misc, Use as directed with albuterol inhaler, Disp: 1 each, Rfl: 0  •  sucralfate (Carafate) 1 g tablet, Take 1 tablet by mouth 4 (Four) Times a Day. (Patient taking differently: Take 1 g by mouth 4 (Four) Times a Day As Needed.), Disp: 120 tablet, Rfl: 1  •  tiotropium bromide monohydrate (Spiriva Respimat) 2.5 MCG/ACT aerosol solution inhaler, Inhale 2 puffs by mouth daily, Disp: 4 g, Rfl: 11  •  tiZANidine (ZANAFLEX) 4 MG tablet, Take 1 tablet by mouth Every Night., Disp: 90 tablet, Rfl: 1  •  valsartan (DIOVAN) 160 MG tablet, Take 1 tablet by mouth Daily., Disp: 90 tablet, Rfl: 3  •  vitamin B-12 (CYANOCOBALAMIN) 1000 MCG tablet, Take 1,000 mcg by mouth Daily., Disp: , Rfl:   •  doxycycline (VIBRAMYCIN) 100 MG capsule, Take 1 capsule by mouth 2 (Two) Times a Day., Disp: 20 capsule, Rfl: 0  •  ubrogepant (ubrogepant) 50 MG tablet, Take 1 tablet by mouth 1 (One) Time As Needed (HA) for up to 1 dose., Disp: 10 tablet, Rfl: 11    Current Facility-Administered  "Medications:   •  heparin injection 500 Units, 5 mL, Intravenous, PRN, Sanjeev Rawls MD  •  sodium chloride 0.9 % flush 10 mL, 10 mL, Intravenous, Q12H, Sanjeev Rawls MD  •  sodium chloride 0.9 % flush 10 mL, 10 mL, Intravenous, PRN, Sanjeev Rawls MD  •  sodium chloride 0.9 % flush 20 mL, 20 mL, Intravenous, PRN, Sanjeev Rawls MD    Objective      Blood pressure 138/96, pulse 94, temperature 98.7 °F (37.1 °C), resp. rate 16, height 162.6 cm (64\"), weight 106 kg (233 lb), SpO2 94 %, not currently breastfeeding.    Physical Exam     General Appearance:    Alert, cooperative, no distress, appears stated age   Head:    Normocephalic, without obvious abnormality, atraumatic   Eyes:    PERRL, conjunctiva/corneas clear, EOM's intact   Ears:    Normal TM's and external ear canals, both ears   Nose:   Nares normal, septum midline, mucosa normal, no drainage   or sinus tenderness   Throat:   Lips, mucosa, and tongue normal; teeth and gums normal   Neck:   Supple, symmetrical, trachea midline, no adenopathy;        thyroid:  No enlargement/tenderness/nodules; no carotid    bruit or JVD   Back:     Symmetric, no curvature, ROM normal, no CVA tenderness   Lungs:     Clear to auscultation bilaterally, respirations unlabored   Chest wall:    No tenderness or deformity   Heart:    Regular rate and rhythm, S1 and S2 normal, no murmur,        rub or gallop   Abdomen:     Soft, non-tender, bowel sounds active all four quadrants,     no masses, no organomegaly   Extremities:   Extremities normal, atraumatic, no cyanosis or edema   Pulses:   2+ and symmetric all extremities   Skin:   Skin color, texture, turgor normal, no rashes or lesions   Lymph nodes:   Cervical, supraclavicular, and axillary nodes normal   Neurologic:   CNII-XII intact. Normal strength, sensation and reflexes       throughout      Results for orders placed or performed during the hospital encounter of 01/29/22   COVID-19 PCR, [facility], NP SWAB IN " [facility]/ORAL SWISH 24-30 HR TAT - Swab, Nasopharynx    Specimen: Nasopharynx; Swab   Result Value Ref Range    SARS-CoV-2 ELDA Not Detected Not Detected         Assessment/Plan     Gastric bypass.     Diagnoses and all orders for this visit:    1. Frontal sinusitis, unspecified chronicity (Primary)  -     Vitamin B12  -     CBC & Differential  -     Lipid Panel  -     Comprehensive Metabolic Panel  -     TSH  -     T4, Free  -     Hemoglobin A1c  -     Vitamin B6    2. Type 2 diabetes mellitus without complication, with long-term current use of insulin (HCC)  -     Vitamin B12  -     CBC & Differential  -     Lipid Panel  -     Comprehensive Metabolic Panel  -     TSH  -     T4, Free  -     Hemoglobin A1c  -     Vitamin B6    Other orders  -     Discontinue: doxycycline (VIBRAMYCIN) 100 MG capsule; Take 1 capsule by mouth 2 (Two) Times a Day.  Dispense: 20 capsule; Refill: 0  -     ubrogepant (ubrogepant) 50 MG tablet; Take 1 tablet by mouth 1 (One) Time As Needed (HA) for up to 1 dose.  Dispense: 10 tablet; Refill: 11  -     doxycycline (VIBRAMYCIN) 100 MG capsule; Take 1 capsule by mouth 2 (Two) Times a Day.  Dispense: 20 capsule; Refill: 0      No follow-ups on file.          There are no Patient Instructions on file for this visit.     Sanjeev Rawls MD    Assessment/Plan

## 2022-02-14 ENCOUNTER — HOSPITAL ENCOUNTER (OUTPATIENT)
Dept: INFUSION THERAPY | Facility: HOSPITAL | Age: 67
Setting detail: INFUSION SERIES
Discharge: HOME | End: 2022-02-14

## 2022-02-14 VITALS
WEIGHT: 230 LBS | TEMPERATURE: 98.6 F | HEART RATE: 78 BPM | SYSTOLIC BLOOD PRESSURE: 151 MMHG | BODY MASS INDEX: 39.48 KG/M2 | RESPIRATION RATE: 18 BRPM | DIASTOLIC BLOOD PRESSURE: 73 MMHG | OXYGEN SATURATION: 97 %

## 2022-02-14 DIAGNOSIS — Z95.828 PORT-A-CATH IN PLACE: ICD-10-CM

## 2022-02-14 DIAGNOSIS — D80.1 COMMON VARIABLE AGAMMAGLOBULINEMIA: Primary | ICD-10-CM

## 2022-02-14 LAB
ALBUMIN SERPL-MCNC: 4.3 G/DL (ref 3.5–5.2)
ALBUMIN/GLOB SERPL: 1.4 G/DL
ALP SERPL-CCNC: 75 U/L (ref 39–117)
ALT SERPL W P-5'-P-CCNC: 17 U/L (ref 1–33)
ANION GAP SERPL CALCULATED.3IONS-SCNC: 10.8 MMOL/L (ref 5–15)
AST SERPL-CCNC: 26 U/L (ref 1–32)
BASOPHILS # BLD AUTO: 0.07 10*3/MM3 (ref 0–0.2)
BASOPHILS NFR BLD AUTO: 1.5 % (ref 0–1.5)
BILIRUB SERPL-MCNC: 0.3 MG/DL (ref 0–1.2)
BUN SERPL-MCNC: 8 MG/DL (ref 8–23)
BUN/CREAT SERPL: 7.1 (ref 7–25)
CALCIUM SPEC-SCNC: 9.5 MG/DL (ref 8.6–10.5)
CHLORIDE SERPL-SCNC: 98 MMOL/L (ref 98–107)
CHOLEST SERPL-MCNC: 207 MG/DL (ref 0–200)
CO2 SERPL-SCNC: 23.2 MMOL/L (ref 22–29)
CREAT SERPL-MCNC: 1.13 MG/DL (ref 0.57–1)
DEPRECATED RDW RBC AUTO: 43.6 FL (ref 37–54)
EOSINOPHIL # BLD AUTO: 0.27 10*3/MM3 (ref 0–0.4)
EOSINOPHIL NFR BLD AUTO: 5.8 % (ref 0.3–6.2)
ERYTHROCYTE [DISTWIDTH] IN BLOOD BY AUTOMATED COUNT: 12.6 % (ref 12.3–15.4)
GFR SERPL CREATININE-BSD FRML MDRD: 48 ML/MIN/1.73
GLOBULIN UR ELPH-MCNC: 3 GM/DL
GLUCOSE SERPL-MCNC: 116 MG/DL (ref 65–99)
HBA1C MFR BLD: 5.6 % (ref 4.8–5.6)
HCT VFR BLD AUTO: 38.9 % (ref 34–46.6)
HDLC SERPL-MCNC: 89 MG/DL (ref 40–60)
HGB BLD-MCNC: 13.6 G/DL (ref 12–15.9)
IMM GRANULOCYTES # BLD AUTO: 0.02 10*3/MM3 (ref 0–0.05)
IMM GRANULOCYTES NFR BLD AUTO: 0.4 % (ref 0–0.5)
LDLC SERPL CALC-MCNC: 105 MG/DL (ref 0–100)
LDLC/HDLC SERPL: 1.17 {RATIO}
LYMPHOCYTES # BLD AUTO: 1.03 10*3/MM3 (ref 0.7–3.1)
LYMPHOCYTES NFR BLD AUTO: 22 % (ref 19.6–45.3)
MCH RBC QN AUTO: 32.9 PG (ref 26.6–33)
MCHC RBC AUTO-ENTMCNC: 35 G/DL (ref 31.5–35.7)
MCV RBC AUTO: 94 FL (ref 79–97)
MONOCYTES # BLD AUTO: 0.42 10*3/MM3 (ref 0.1–0.9)
MONOCYTES NFR BLD AUTO: 9 % (ref 5–12)
NEUTROPHILS NFR BLD AUTO: 2.87 10*3/MM3 (ref 1.7–7)
NEUTROPHILS NFR BLD AUTO: 61.3 % (ref 42.7–76)
NRBC BLD AUTO-RTO: 0 /100 WBC (ref 0–0.2)
PLATELET # BLD AUTO: 267 10*3/MM3 (ref 140–450)
PMV BLD AUTO: 8.6 FL (ref 6–12)
POTASSIUM SERPL-SCNC: 4.5 MMOL/L (ref 3.5–5.2)
PROT SERPL-MCNC: 7.3 G/DL (ref 6–8.5)
RBC # BLD AUTO: 4.14 10*6/MM3 (ref 3.77–5.28)
SODIUM SERPL-SCNC: 132 MMOL/L (ref 136–145)
T4 FREE SERPL-MCNC: 1.21 NG/DL (ref 0.93–1.7)
TRIGL SERPL-MCNC: 71 MG/DL (ref 0–150)
TSH SERPL DL<=0.05 MIU/L-ACNC: 2.66 UIU/ML (ref 0.27–4.2)
VIT B12 BLD-MCNC: 1727 PG/ML (ref 211–946)
VLDLC SERPL-MCNC: 13 MG/DL (ref 5–40)
WBC NRBC COR # BLD: 4.68 10*3/MM3 (ref 3.4–10.8)

## 2022-02-14 PROCEDURE — 96361 HYDRATE IV INFUSION ADD-ON: CPT

## 2022-02-14 PROCEDURE — 84207 ASSAY OF VITAMIN B-6: CPT | Performed by: INTERNAL MEDICINE

## 2022-02-14 PROCEDURE — 96365 THER/PROPH/DIAG IV INF INIT: CPT

## 2022-02-14 PROCEDURE — 84443 ASSAY THYROID STIM HORMONE: CPT | Performed by: INTERNAL MEDICINE

## 2022-02-14 PROCEDURE — 36415 COLL VENOUS BLD VENIPUNCTURE: CPT | Performed by: INTERNAL MEDICINE

## 2022-02-14 PROCEDURE — 85025 COMPLETE CBC W/AUTO DIFF WBC: CPT | Performed by: INTERNAL MEDICINE

## 2022-02-14 PROCEDURE — 83036 HEMOGLOBIN GLYCOSYLATED A1C: CPT | Performed by: INTERNAL MEDICINE

## 2022-02-14 PROCEDURE — 96366 THER/PROPH/DIAG IV INF ADDON: CPT

## 2022-02-14 PROCEDURE — 84439 ASSAY OF FREE THYROXINE: CPT | Performed by: INTERNAL MEDICINE

## 2022-02-14 PROCEDURE — 25010000002 IMMUNE GLOBULIN (HUMAN) 20 GM/200ML SOLUTION: Performed by: ALLERGY & IMMUNOLOGY

## 2022-02-14 PROCEDURE — 96360 HYDRATION IV INFUSION INIT: CPT

## 2022-02-14 PROCEDURE — 80053 COMPREHEN METABOLIC PANEL: CPT | Performed by: INTERNAL MEDICINE

## 2022-02-14 PROCEDURE — 82607 VITAMIN B-12: CPT | Performed by: INTERNAL MEDICINE

## 2022-02-14 PROCEDURE — 80061 LIPID PANEL: CPT | Performed by: INTERNAL MEDICINE

## 2022-02-14 PROCEDURE — 25010000002 HEPARIN LOCK FLUSH PER 10 UNITS: Performed by: ALLERGY & IMMUNOLOGY

## 2022-02-14 RX ORDER — METHYLPREDNISOLONE SODIUM SUCCINATE 125 MG/2ML
50 INJECTION, POWDER, LYOPHILIZED, FOR SOLUTION INTRAMUSCULAR; INTRAVENOUS ONCE AS NEEDED
Status: CANCELLED
Start: 2022-02-28

## 2022-02-14 RX ORDER — EPINEPHRINE 1 MG/ML
0.3 INJECTION, SOLUTION INTRAMUSCULAR; SUBCUTANEOUS ONCE AS NEEDED
Status: CANCELLED
Start: 2022-02-28

## 2022-02-14 RX ORDER — DIPHENHYDRAMINE HCL 25 MG
50 CAPSULE ORAL ONCE
Status: DISCONTINUED | OUTPATIENT
Start: 2022-02-14 | End: 2022-02-16 | Stop reason: HOSPADM

## 2022-02-14 RX ORDER — ACETAMINOPHEN 325 MG/1
325 TABLET ORAL ONCE
Status: CANCELLED | OUTPATIENT
Start: 2022-02-28

## 2022-02-14 RX ORDER — DIPHENHYDRAMINE HCL 25 MG
50 CAPSULE ORAL ONCE AS NEEDED
Status: CANCELLED
Start: 2022-02-28

## 2022-02-14 RX ORDER — SODIUM CHLORIDE 0.9 % (FLUSH) 0.9 %
10 SYRINGE (ML) INJECTION AS NEEDED
Status: CANCELLED | OUTPATIENT
Start: 2022-02-14

## 2022-02-14 RX ORDER — PREDNISONE 20 MG/1
40 TABLET ORAL ONCE AS NEEDED
Status: CANCELLED
Start: 2022-02-28

## 2022-02-14 RX ORDER — HEPARIN SODIUM (PORCINE) LOCK FLUSH IV SOLN 100 UNIT/ML 100 UNIT/ML
500 SOLUTION INTRAVENOUS AS NEEDED
Status: DISCONTINUED | OUTPATIENT
Start: 2022-02-14 | End: 2022-02-16 | Stop reason: HOSPADM

## 2022-02-14 RX ORDER — HEPARIN SODIUM (PORCINE) LOCK FLUSH IV SOLN 100 UNIT/ML 100 UNIT/ML
500 SOLUTION INTRAVENOUS AS NEEDED
Status: CANCELLED | OUTPATIENT
Start: 2022-02-14

## 2022-02-14 RX ORDER — PREDNISONE 20 MG/1
40 TABLET ORAL ONCE
Status: CANCELLED
Start: 2022-02-28 | End: 2022-02-28

## 2022-02-14 RX ORDER — ACETAMINOPHEN 325 MG/1
325 TABLET ORAL ONCE
Status: DISCONTINUED | OUTPATIENT
Start: 2022-02-14 | End: 2022-02-16 | Stop reason: HOSPADM

## 2022-02-14 RX ORDER — DIPHENHYDRAMINE HCL 25 MG
50 CAPSULE ORAL ONCE
Status: CANCELLED
Start: 2022-02-28 | End: 2022-02-28

## 2022-02-14 RX ORDER — SODIUM CHLORIDE 0.9 % (FLUSH) 0.9 %
10 SYRINGE (ML) INJECTION AS NEEDED
Status: DISCONTINUED | OUTPATIENT
Start: 2022-02-14 | End: 2022-02-16 | Stop reason: HOSPADM

## 2022-02-14 RX ADMIN — IMMUNE GLOBULIN INFUSION (HUMAN) 20 G: 100 INJECTION, SOLUTION INTRAVENOUS; SUBCUTANEOUS at 11:26

## 2022-02-14 RX ADMIN — SODIUM CHLORIDE 500 ML: 9 INJECTION, SOLUTION INTRAVENOUS at 08:31

## 2022-02-14 RX ADMIN — HEPARIN 500 UNITS: 100 SYRINGE at 14:26

## 2022-02-14 RX ADMIN — IMMUNE GLOBULIN INFUSION (HUMAN) 20 G: 100 INJECTION, SOLUTION INTRAVENOUS; SUBCUTANEOUS at 09:08

## 2022-02-14 RX ADMIN — SODIUM CHLORIDE, PRESERVATIVE FREE 10 ML: 5 INJECTION INTRAVENOUS at 14:26

## 2022-02-14 NOTE — PROGRESS NOTES
Please let them know the labs are stable. Renal function improved. Not resolved. Ha1c improved. Will talk more details on rtc. Keep drinking water.

## 2022-02-15 ENCOUNTER — OFFICE VISIT (OUTPATIENT)
Dept: CARDIOLOGY | Facility: CLINIC | Age: 67
End: 2022-02-15

## 2022-02-15 VITALS
WEIGHT: 233 LBS | OXYGEN SATURATION: 99 % | SYSTOLIC BLOOD PRESSURE: 136 MMHG | DIASTOLIC BLOOD PRESSURE: 92 MMHG | BODY MASS INDEX: 39.78 KG/M2 | HEART RATE: 89 BPM | HEIGHT: 64 IN | RESPIRATION RATE: 18 BRPM

## 2022-02-15 DIAGNOSIS — N18.30 STAGE 3 CHRONIC KIDNEY DISEASE, UNSPECIFIED WHETHER STAGE 3A OR 3B CKD: ICD-10-CM

## 2022-02-15 DIAGNOSIS — I35.0 NONRHEUMATIC AORTIC VALVE STENOSIS: ICD-10-CM

## 2022-02-15 DIAGNOSIS — I35.0 AORTIC STENOSIS, MILD: Primary | ICD-10-CM

## 2022-02-15 DIAGNOSIS — I20.1 PRINZMETAL'S ANGINA: ICD-10-CM

## 2022-02-15 DIAGNOSIS — I50.32 CHRONIC DIASTOLIC HEART FAILURE: ICD-10-CM

## 2022-02-15 PROCEDURE — 99214 OFFICE O/P EST MOD 30 MIN: CPT | Performed by: INTERNAL MEDICINE

## 2022-02-15 RX ORDER — ISOSORBIDE MONONITRATE 60 MG/1
60 TABLET, EXTENDED RELEASE ORAL EVERY MORNING
Qty: 90 TABLET | Refills: 3 | Status: SHIPPED | OUTPATIENT
Start: 2022-02-15 | End: 2022-08-17 | Stop reason: SDUPTHER

## 2022-02-21 LAB — VIT B6 SERPL-MCNC: 30.7 UG/L (ref 2–32.8)

## 2022-02-22 ENCOUNTER — TELEPHONE (OUTPATIENT)
Dept: INTERNAL MEDICINE | Facility: CLINIC | Age: 67
End: 2022-02-22

## 2022-02-22 RX ORDER — FLUCONAZOLE 150 MG/1
150 TABLET ORAL ONCE
Qty: 1 TABLET | Refills: 0 | Status: SHIPPED | OUTPATIENT
Start: 2022-02-22 | End: 2022-02-23

## 2022-02-22 RX ORDER — CLARITHROMYCIN 500 MG/1
500 TABLET, COATED ORAL EVERY 12 HOURS SCHEDULED
Qty: 28 TABLET | Refills: 0 | Status: SHIPPED | OUTPATIENT
Start: 2022-02-22 | End: 2022-04-11

## 2022-02-22 NOTE — TELEPHONE ENCOUNTER
Caller: Raquel Mariee    Relationship: Self    Best call back number:[phone]    What medication are you requesting: ANTI-BIOTIC-STRONGER ONE.  THE ONE YOU WROTE DIDN'T WORK.     What are your current symptoms: COUGH UP GRAYISH-SALGUERO GUNK. ALSO REFILL DIFLUCAN FOR HER YEAST INFECTION.    How long have you been experiencing symptoms: 7 DAYS     Have you had these symptoms before:   [x] Yes  [] No    Have you been treated for these symptoms before:  [x] Yes  [] No    If a prescription is needed, what is your preferred pharmacy and phone number: [facility]     Additional notes:

## 2022-03-16 RX ORDER — DICYCLOMINE HYDROCHLORIDE 10 MG/1
10 CAPSULE ORAL 3 TIMES DAILY
Qty: 270 CAPSULE | Refills: 3 | Status: SHIPPED | OUTPATIENT
Start: 2022-03-16 | End: 2023-03-29 | Stop reason: SDUPTHER

## 2022-03-16 NOTE — TELEPHONE ENCOUNTER
Rx Refill Note  Requested Prescriptions     Pending Prescriptions Disp Refills   • dicyclomine (BENTYL) 10 MG capsule 270 capsule 3     Sig: Take 1 capsule by mouth 3 (Three) Times a Day.      Last office visit with prescribing clinician: 2/12/2022      Next office visit with prescribing clinician: 3/22/2022            CHIQUI BYRNE MA  03/16/22, 09:04 EDT

## 2022-03-17 ENCOUNTER — HOSPITAL ENCOUNTER (OUTPATIENT)
Dept: INFUSION THERAPY | Facility: HOSPITAL | Age: 67
Setting detail: INFUSION SERIES
Discharge: HOME | End: 2022-03-17

## 2022-03-17 VITALS
WEIGHT: 222 LBS | TEMPERATURE: 98.4 F | DIASTOLIC BLOOD PRESSURE: 83 MMHG | SYSTOLIC BLOOD PRESSURE: 146 MMHG | RESPIRATION RATE: 20 BRPM | OXYGEN SATURATION: 95 % | BODY MASS INDEX: 38.11 KG/M2 | HEART RATE: 97 BPM

## 2022-03-17 DIAGNOSIS — Z95.828 PORT-A-CATH IN PLACE: ICD-10-CM

## 2022-03-17 DIAGNOSIS — D80.1 COMMON VARIABLE AGAMMAGLOBULINEMIA: ICD-10-CM

## 2022-03-17 DIAGNOSIS — D83.9 COMMON VARIABLE IMMUNODEFICIENCY: Primary | ICD-10-CM

## 2022-03-17 LAB
ALBUMIN SERPL-MCNC: 4.3 G/DL (ref 3.5–5.2)
ALBUMIN/GLOB SERPL: 1.5 G/DL
ALP SERPL-CCNC: 75 U/L (ref 39–117)
ALT SERPL W P-5'-P-CCNC: 19 U/L (ref 1–33)
ANION GAP SERPL CALCULATED.3IONS-SCNC: 11.7 MMOL/L (ref 5–15)
AST SERPL-CCNC: 26 U/L (ref 1–32)
BASOPHILS # BLD AUTO: 0.08 10*3/MM3 (ref 0–0.2)
BASOPHILS NFR BLD AUTO: 1.6 % (ref 0–1.5)
BILIRUB SERPL-MCNC: 0.3 MG/DL (ref 0–1.2)
BUN SERPL-MCNC: 8 MG/DL (ref 8–23)
BUN/CREAT SERPL: 6.6 (ref 7–25)
CALCIUM SPEC-SCNC: 9.4 MG/DL (ref 8.6–10.5)
CHLORIDE SERPL-SCNC: 98 MMOL/L (ref 98–107)
CO2 SERPL-SCNC: 23.3 MMOL/L (ref 22–29)
CREAT SERPL-MCNC: 1.21 MG/DL (ref 0.57–1)
DEPRECATED RDW RBC AUTO: 42.5 FL (ref 37–54)
EGFRCR SERPLBLD CKD-EPI 2021: 49.5 ML/MIN/1.73
EOSINOPHIL # BLD AUTO: 0.34 10*3/MM3 (ref 0–0.4)
EOSINOPHIL NFR BLD AUTO: 6.9 % (ref 0.3–6.2)
ERYTHROCYTE [DISTWIDTH] IN BLOOD BY AUTOMATED COUNT: 12.6 % (ref 12.3–15.4)
GLOBULIN UR ELPH-MCNC: 2.8 GM/DL
GLUCOSE SERPL-MCNC: 117 MG/DL (ref 65–99)
HCT VFR BLD AUTO: 38 % (ref 34–46.6)
HGB BLD-MCNC: 13.3 G/DL (ref 12–15.9)
IGG1 SER-MCNC: 1071 MG/DL (ref 700–1600)
IMM GRANULOCYTES # BLD AUTO: 0.04 10*3/MM3 (ref 0–0.05)
IMM GRANULOCYTES NFR BLD AUTO: 0.8 % (ref 0–0.5)
LYMPHOCYTES # BLD AUTO: 1.22 10*3/MM3 (ref 0.7–3.1)
LYMPHOCYTES NFR BLD AUTO: 24.9 % (ref 19.6–45.3)
MCH RBC QN AUTO: 32.1 PG (ref 26.6–33)
MCHC RBC AUTO-ENTMCNC: 35 G/DL (ref 31.5–35.7)
MCV RBC AUTO: 91.8 FL (ref 79–97)
MONOCYTES # BLD AUTO: 0.52 10*3/MM3 (ref 0.1–0.9)
MONOCYTES NFR BLD AUTO: 10.6 % (ref 5–12)
NEUTROPHILS NFR BLD AUTO: 2.7 10*3/MM3 (ref 1.7–7)
NEUTROPHILS NFR BLD AUTO: 55.2 % (ref 42.7–76)
NRBC BLD AUTO-RTO: 0 /100 WBC (ref 0–0.2)
PLATELET # BLD AUTO: 263 10*3/MM3 (ref 140–450)
PMV BLD AUTO: 8.4 FL (ref 6–12)
POTASSIUM SERPL-SCNC: 4.5 MMOL/L (ref 3.5–5.2)
PROT SERPL-MCNC: 7.1 G/DL (ref 6–8.5)
RBC # BLD AUTO: 4.14 10*6/MM3 (ref 3.77–5.28)
SODIUM SERPL-SCNC: 133 MMOL/L (ref 136–145)
WBC NRBC COR # BLD: 4.9 10*3/MM3 (ref 3.4–10.8)

## 2022-03-17 PROCEDURE — 25010000002 IMMUNE GLOBULIN (HUMAN) 20 GM/200ML SOLUTION: Performed by: ALLERGY & IMMUNOLOGY

## 2022-03-17 PROCEDURE — 96367 TX/PROPH/DG ADDL SEQ IV INF: CPT

## 2022-03-17 PROCEDURE — 80053 COMPREHEN METABOLIC PANEL: CPT | Performed by: ALLERGY & IMMUNOLOGY

## 2022-03-17 PROCEDURE — 25010000002 HEPARIN LOCK FLUSH PER 10 UNITS: Performed by: ALLERGY & IMMUNOLOGY

## 2022-03-17 PROCEDURE — 85025 COMPLETE CBC W/AUTO DIFF WBC: CPT | Performed by: ALLERGY & IMMUNOLOGY

## 2022-03-17 PROCEDURE — 96366 THER/PROPH/DIAG IV INF ADDON: CPT

## 2022-03-17 PROCEDURE — 82784 ASSAY IGA/IGD/IGG/IGM EACH: CPT | Performed by: ALLERGY & IMMUNOLOGY

## 2022-03-17 PROCEDURE — 96361 HYDRATE IV INFUSION ADD-ON: CPT

## 2022-03-17 PROCEDURE — 96365 THER/PROPH/DIAG IV INF INIT: CPT

## 2022-03-17 RX ORDER — HEPARIN SODIUM (PORCINE) LOCK FLUSH IV SOLN 100 UNIT/ML 100 UNIT/ML
500 SOLUTION INTRAVENOUS AS NEEDED
Status: DISCONTINUED | OUTPATIENT
Start: 2022-03-17 | End: 2022-03-19 | Stop reason: HOSPADM

## 2022-03-17 RX ORDER — PREDNISONE 20 MG/1
40 TABLET ORAL ONCE AS NEEDED
Status: CANCELLED
Start: 2022-03-31

## 2022-03-17 RX ORDER — EPINEPHRINE 1 MG/ML
0.3 INJECTION, SOLUTION INTRAMUSCULAR; SUBCUTANEOUS ONCE AS NEEDED
Status: CANCELLED
Start: 2022-03-31

## 2022-03-17 RX ORDER — EPINEPHRINE 1 MG/ML
0.3 INJECTION, SOLUTION INTRAMUSCULAR; SUBCUTANEOUS ONCE AS NEEDED
Status: DISCONTINUED | OUTPATIENT
Start: 2022-03-17 | End: 2022-03-19 | Stop reason: HOSPADM

## 2022-03-17 RX ORDER — PREDNISONE 20 MG/1
40 TABLET ORAL ONCE
Status: CANCELLED
Start: 2022-03-31 | End: 2022-03-31

## 2022-03-17 RX ORDER — METHYLPREDNISOLONE SODIUM SUCCINATE 125 MG/2ML
50 INJECTION, POWDER, LYOPHILIZED, FOR SOLUTION INTRAMUSCULAR; INTRAVENOUS ONCE AS NEEDED
Status: DISCONTINUED | OUTPATIENT
Start: 2022-03-17 | End: 2022-03-19 | Stop reason: HOSPADM

## 2022-03-17 RX ORDER — DIPHENHYDRAMINE HCL 25 MG
50 CAPSULE ORAL ONCE
Status: CANCELLED
Start: 2022-03-31 | End: 2022-03-31

## 2022-03-17 RX ORDER — ACETAMINOPHEN 325 MG/1
325 TABLET ORAL ONCE
Status: CANCELLED | OUTPATIENT
Start: 2022-03-31

## 2022-03-17 RX ORDER — DIPHENHYDRAMINE HCL 25 MG
50 CAPSULE ORAL ONCE AS NEEDED
Status: CANCELLED
Start: 2022-03-31

## 2022-03-17 RX ORDER — PREDNISONE 20 MG/1
40 TABLET ORAL ONCE AS NEEDED
Status: DISCONTINUED | OUTPATIENT
Start: 2022-03-17 | End: 2022-03-19 | Stop reason: HOSPADM

## 2022-03-17 RX ORDER — DIPHENHYDRAMINE HCL 25 MG
50 CAPSULE ORAL ONCE
Status: DISCONTINUED | OUTPATIENT
Start: 2022-03-17 | End: 2022-03-19 | Stop reason: HOSPADM

## 2022-03-17 RX ORDER — ACETAMINOPHEN 325 MG/1
325 TABLET ORAL ONCE
Status: DISCONTINUED | OUTPATIENT
Start: 2022-03-17 | End: 2022-03-19 | Stop reason: HOSPADM

## 2022-03-17 RX ORDER — DIPHENHYDRAMINE HCL 25 MG
50 CAPSULE ORAL ONCE AS NEEDED
Status: DISCONTINUED | OUTPATIENT
Start: 2022-03-17 | End: 2022-03-19 | Stop reason: HOSPADM

## 2022-03-17 RX ORDER — METHYLPREDNISOLONE SODIUM SUCCINATE 125 MG/2ML
50 INJECTION, POWDER, LYOPHILIZED, FOR SOLUTION INTRAMUSCULAR; INTRAVENOUS ONCE AS NEEDED
Status: CANCELLED
Start: 2022-03-31

## 2022-03-17 RX ORDER — HEPARIN SODIUM (PORCINE) LOCK FLUSH IV SOLN 100 UNIT/ML 100 UNIT/ML
500 SOLUTION INTRAVENOUS AS NEEDED
Status: CANCELLED | OUTPATIENT
Start: 2022-03-17

## 2022-03-17 RX ORDER — PREDNISONE 20 MG/1
40 TABLET ORAL ONCE
Status: DISCONTINUED | OUTPATIENT
Start: 2022-03-17 | End: 2022-03-19 | Stop reason: HOSPADM

## 2022-03-17 RX ORDER — SODIUM CHLORIDE 0.9 % (FLUSH) 0.9 %
10 SYRINGE (ML) INJECTION AS NEEDED
Status: DISCONTINUED | OUTPATIENT
Start: 2022-03-17 | End: 2022-03-19 | Stop reason: HOSPADM

## 2022-03-17 RX ORDER — SODIUM CHLORIDE 0.9 % (FLUSH) 0.9 %
10 SYRINGE (ML) INJECTION AS NEEDED
Status: CANCELLED | OUTPATIENT
Start: 2022-03-17

## 2022-03-17 RX ADMIN — IMMUNE GLOBULIN INFUSION (HUMAN) 20 G: 100 INJECTION, SOLUTION INTRAVENOUS; SUBCUTANEOUS at 11:38

## 2022-03-17 RX ADMIN — Medication 10 ML: at 14:12

## 2022-03-17 RX ADMIN — Medication 500 UNITS: at 14:12

## 2022-03-17 RX ADMIN — SODIUM CHLORIDE 500 ML: 9 INJECTION, SOLUTION INTRAVENOUS at 08:49

## 2022-03-17 RX ADMIN — IMMUNE GLOBULIN INFUSION (HUMAN) 20 G: 100 INJECTION, SOLUTION INTRAVENOUS; SUBCUTANEOUS at 09:24

## 2022-03-18 ENCOUNTER — APPOINTMENT (OUTPATIENT)
Dept: INFUSION THERAPY | Facility: HOSPITAL | Age: 67
End: 2022-03-18

## 2022-03-22 ENCOUNTER — OFFICE VISIT (OUTPATIENT)
Dept: INTERNAL MEDICINE | Facility: CLINIC | Age: 67
End: 2022-03-22

## 2022-03-22 VITALS
WEIGHT: 222 LBS | RESPIRATION RATE: 16 BRPM | TEMPERATURE: 98.2 F | OXYGEN SATURATION: 95 % | HEIGHT: 64 IN | SYSTOLIC BLOOD PRESSURE: 124 MMHG | BODY MASS INDEX: 37.9 KG/M2 | DIASTOLIC BLOOD PRESSURE: 82 MMHG | HEART RATE: 82 BPM

## 2022-03-22 DIAGNOSIS — A04.8 H. PYLORI INFECTION: ICD-10-CM

## 2022-03-22 DIAGNOSIS — R63.4 WEIGHT LOSS: ICD-10-CM

## 2022-03-22 DIAGNOSIS — R19.7 DIARRHEA, UNSPECIFIED TYPE: ICD-10-CM

## 2022-03-22 DIAGNOSIS — R11.0 NAUSEA: Primary | ICD-10-CM

## 2022-03-22 PROCEDURE — 99214 OFFICE O/P EST MOD 30 MIN: CPT | Performed by: INTERNAL MEDICINE

## 2022-03-22 RX ORDER — TIZANIDINE 4 MG/1
4 TABLET ORAL NIGHTLY
Qty: 90 TABLET | Refills: 1 | Status: SHIPPED | OUTPATIENT
Start: 2022-03-22 | End: 2022-07-14 | Stop reason: SDUPTHER

## 2022-03-22 NOTE — PROGRESS NOTES
Subjective     Patient ID: Raquel Mariee is a 66 y.o. female. Patient is here for management of multiple medical problems.     Chief Complaint   Patient presents with   • Nausea   • Abdominal Pain   • Wrist Pain     Left wrist     History of Present Illness       Ab pain      N/v diarrhea x 2 weeks.  Hx h pylori.    S/p ccy.  Stopped rybelus.  Colonoscopy only in jan.   No egd.          The following portions of the patient's history were reviewed and updated as appropriate: allergies, current medications, past family history, past medical history, past social history, past surgical history and problem list.    Review of Systems    Current Outpatient Medications:   •  acetaminophen-codeine (TYLENOL #3) 300-30 MG per tablet, Take 1 tablet by mouth Every 4 (Four) Hours As Needed for Moderate Pain ., Disp: 10 tablet, Rfl: 0  •  albuterol sulfate  (90 Base) MCG/ACT inhaler, Inhale 2 puffs by mouth every 4-6 hours as needed for cough, wheeze, shortness of breath. Use with vortex spacer, Disp: 8.5 g, Rfl: 3  •  allopurinol (Zyloprim) 100 MG tablet, Take 1 tablet by mouth Daily., Disp: 90 tablet, Rfl: 3  •  Azelastine HCl 137 MCG/SPRAY solution, Instill 1-2 sprays in each nostril twice a day as needed, Disp: 30 mL, Rfl: 11  •  betamethasone valerate (VALISONE) 0.1 % cream, Apply topically to the appropriate area as directed Daily., Disp: 45 g, Rfl: 11  •  buPROPion SR (WELLBUTRIN SR) 150 MG 12 hr tablet, Take 1 tablet by mouth 2 (Two) Times a Day., Disp: 180 tablet, Rfl: 3  •  calcium carbonate (Antacid Maximum) 1000 MG chewable tablet, Chew 500 mg 3 (Three) Times a Day., Disp: , Rfl:   •  cholecalciferol (VITAMIN D3) 25 MCG (1000 UT) tablet, Take 1,000 Units by mouth Daily., Disp: , Rfl:   •  clarithromycin (Biaxin) 500 MG tablet, Take 1 tablet by mouth Every 12 (Twelve) Hours., Disp: 28 tablet, Rfl: 0  •  clonazePAM (KlonoPIN) 0.5 MG tablet, Take 1 tablet by mouth At Night As Needed, Disp: 30 tablet, Rfl:  1  •  Dextromethorphan-guaiFENesin (Mucus-DM)  MG tablet sustained-release 12 hour, Take  by mouth., Disp: , Rfl:   •  dicyclomine (BENTYL) 10 MG capsule, Take 1 capsule by mouth 3 (Three) Times a Day., Disp: 270 capsule, Rfl: 3  •  diphenhydrAMINE (Benadryl Allergy) 25 MG tablet, Take 1-2 tablets by mouth Every 4-6 Hours As Needed., Disp: 90 tablet, Rfl: 11  •  famotidine (PEPCID) 20 MG tablet, Take 20 mg by mouth 2 (Two) Times a Day., Disp: , Rfl:   •  fexofenadine (ALLEGRA) 180 MG tablet, Take 180 mg by mouth Daily., Disp: , Rfl:   •  fluticasone (FLONASE) 50 MCG/ACT nasal spray, Instill 2 sprays into each nostril daily., Disp: 16 g, Rfl: 11  •  fluticasone-salmeterol (Advair HFA) 230-21 MCG/ACT inhaler, Inhale 2 puffs by mouth 2 (Two) Times a Day., Disp: 12 g, Rfl: 11  •  furosemide (LASIX) 40 MG tablet, Take 1 tablet by mouth Daily. (Patient taking differently: Take 40 mg by mouth Daily. Two days a month.), Disp: 90 tablet, Rfl: 3  •  ipratropium (ATROVENT) 0.06 % nasal spray, Spray 2 sprays into the nostril(s) every night at bedtime as directed by provider, Disp: 15 mL, Rfl: 12  •  ipratropium-albuterol (DUO-NEB) 0.5-2.5 mg/3 ml nebulizer, Inhale contents of 1 vial (3mL) via nebulizer every 4-6 hours as needed for coughing, wheezing, or shortness of breath., Disp: 300 mL, Rfl: 3  •  ipratropium-albuterol (DUO-NEB) 0.5-2.5 mg/3 ml nebulizer, Inhale 1 vial (3 mL) by nebulization every 4-6 hours as needed for cough, wheezing, and shortness of breath, Disp: 270 mL, Rfl: 3  •  isosorbide mononitrate (IMDUR) 60 MG 24 hr tablet, Take 1 tablet by mouth Every Morning., Disp: 90 tablet, Rfl: 3  •  levothyroxine (SYNTHROID, LEVOTHROID) 50 MCG tablet, Take 1 tablet by mouth Daily., Disp: 90 tablet, Rfl: 3  •  Magnesium 250 MG tablet, Take 500 mg by mouth 2 (Two) Times a Day., Disp: , Rfl:   •  melatonin 5 MG tablet tablet, Take 5 mg by mouth Every Night., Disp: , Rfl:   •  montelukast (SINGULAIR) 10 MG tablet, Take  1 tablet by mouth every night at bedtime., Disp: 30 tablet, Rfl: 11  •  multivitamin with minerals tablet tablet, Take 1 tablet by mouth Daily., Disp: , Rfl:   •  omeprazole (priLOSEC) 40 MG capsule, Take 1 capsule by mouth 2 (two) times a day., Disp: 180 capsule, Rfl: 3  •  ondansetron (ZOFRAN) 4 MG tablet, Take 1 tablet by mouth Every 8 (Eight) Hours As Needed for Nausea or Vomiting., Disp: 30 tablet, Rfl: 11  •  potassium citrate (UROCIT-K) 10 MEQ (1080 MG) CR tablet, Take 1 tablet by mouth Daily., Disp: 90 tablet, Rfl: 3  •  predniSONE (DELTASONE) 20 MG tablet, With Gamma globulin infusion -monthly, Disp: , Rfl:   •  promethazine-dextromethorphan (PROMETHAZINE-DM) 6.25-15 MG/5ML syrup, Take 5 mL by mouth 4 (Four) Times a Day As Needed for Cough., Disp: 240 mL, Rfl: 0  •  Semaglutide (Rybelsus) 14 MG tablet, Take 1 tablet by mouth Daily., Disp: 30 tablet, Rfl: 11  •  sodium-potassium-magnesium sulfates (Suprep Bowel Prep Kit) 17.5-3.13-1.6 GM/177ML solution oral solution, Take 1 bottle by mouth Every 12 (Twelve) Hours., Disp: 354 mL, Rfl: 0  •  Spacer/Aero-Holding Chambers (AeroChamber Plus Wali-Vu) misc, Use as directed with albuterol inhaler, Disp: 1 each, Rfl: 0  •  sucralfate (Carafate) 1 g tablet, Take 1 tablet by mouth 4 (Four) Times a Day. (Patient taking differently: Take 1 g by mouth 4 (Four) Times a Day As Needed.), Disp: 120 tablet, Rfl: 1  •  tiotropium bromide monohydrate (Spiriva Respimat) 2.5 MCG/ACT aerosol solution inhaler, Inhale 2 puffs by mouth daily, Disp: 4 g, Rfl: 11  •  tiZANidine (ZANAFLEX) 4 MG tablet, Take 1 tablet by mouth Every Night., Disp: 90 tablet, Rfl: 1  •  ubrogepant (ubrogepant) 50 MG tablet, Take 1 tablet by mouth 1 (One) Time As Needed for headache for up to 1 dose per day, Disp: 10 tablet, Rfl: 11  •  valsartan (DIOVAN) 160 MG tablet, Take 1 tablet by mouth Daily., Disp: 90 tablet, Rfl: 3  •  vitamin B-12 (CYANOCOBALAMIN) 1000 MCG tablet, Take 1,000 mcg by mouth Daily.,  "Disp: , Rfl:     Current Facility-Administered Medications:   •  heparin injection 500 Units, 5 mL, Intravenous, PRN, Sanjeev Rawls MD  •  sodium chloride 0.9 % flush 10 mL, 10 mL, Intravenous, Q12H, Sanjeev Rawls MD  •  sodium chloride 0.9 % flush 10 mL, 10 mL, Intravenous, PRN, Sanjeev Rawls MD  •  sodium chloride 0.9 % flush 20 mL, 20 mL, Intravenous, PRN, Sanjeev Rawls MD    Objective      Blood pressure 124/82, pulse 82, temperature 98.2 °F (36.8 °C), resp. rate 16, height 162.6 cm (64\"), weight 101 kg (222 lb), SpO2 95 %, not currently breastfeeding.    Physical Exam     General Appearance:    Alert, cooperative, no distress, appears stated age   Head:    Normocephalic, without obvious abnormality, atraumatic   Eyes:    PERRL, conjunctiva/corneas clear, EOM's intact   Ears:    Normal TM's and external ear canals, both ears   Nose:   Nares normal, septum midline, mucosa normal, no drainage   or sinus tenderness   Throat:   Lips, mucosa, and tongue normal; teeth and gums normal   Neck:   Supple, symmetrical, trachea midline, no adenopathy;        thyroid:  No enlargement/tenderness/nodules; no carotid    bruit or JVD   Back:     Symmetric, no curvature, ROM normal, no CVA tenderness   Lungs:     Clear to auscultation bilaterally, respirations unlabored   Chest wall:    No tenderness or deformity   Heart:    Regular rate and rhythm, S1 and S2 normal, no murmur,        rub or gallop   Abdomen:     Soft, non-tender, bowel sounds active all four quadrants,     no masses, no organomegaly   Extremities:   Extremities normal, atraumatic, no cyanosis or edema   Pulses:   2+ and symmetric all extremities   Skin:   Skin color, texture, turgor normal, no rashes or lesions   Lymph nodes:   Cervical, supraclavicular, and axillary nodes normal   Neurologic:   CNII-XII intact. Normal strength, sensation and reflexes       throughout      Results for orders placed or performed in visit on 03/22/22   Helicobacter " Pylori, IgA IgG IgM    Specimen: Blood   Result Value Ref Range    H. pylori IgG 2.04 (H) 0.00 - 0.79 Index Value    H. pylori, IgA ABS      H. Pylori, IgM     Comprehensive Metabolic Panel    Specimen: Blood   Result Value Ref Range    Glucose 111 (H) 65 - 99 mg/dL    BUN 7 (L) 8 - 23 mg/dL    Creatinine 1.15 (H) 0.57 - 1.00 mg/dL    EGFR Result 52.6 (L) >60.0 mL/min/1.73    BUN/Creatinine Ratio 6.1 (L) 7.0 - 25.0    Sodium 130 (L) 136 - 145 mmol/L    Potassium 4.8 3.5 - 5.2 mmol/L    Chloride 96 (L) 98 - 107 mmol/L    Total CO2 23.9 22.0 - 29.0 mmol/L    Calcium 9.5 8.6 - 10.5 mg/dL    Total Protein 7.0 6.0 - 8.5 g/dL    Albumin 4.20 3.50 - 5.20 g/dL    Globulin 2.8 gm/dL    A/G Ratio 1.5 g/dL    Total Bilirubin 0.3 0.0 - 1.2 mg/dL    Alkaline Phosphatase 69 39 - 117 U/L    AST (SGOT) 24 1 - 32 U/L    ALT (SGPT) 16 1 - 33 U/L   Unable To Void   Result Value Ref Range    Unable to Void Comment    CBC & Differential    Specimen: Blood   Result Value Ref Range    WBC 4.75 3.40 - 10.80 10*3/mm3    RBC 4.14 3.77 - 5.28 10*6/mm3    Hemoglobin 13.3 12.0 - 15.9 g/dL    Hematocrit 39.0 34.0 - 46.6 %    MCV 94.2 79.0 - 97.0 fL    MCH 32.1 26.6 - 33.0 pg    MCHC 34.1 31.5 - 35.7 g/dL    RDW 12.9 12.3 - 15.4 %    Platelets 264 140 - 450 10*3/mm3    Neutrophil Rel % 61.5 42.7 - 76.0 %    Lymphocyte Rel % 20.4 19.6 - 45.3 %    Monocyte Rel % 9.7 5.0 - 12.0 %    Eosinophil Rel % 6.5 (H) 0.3 - 6.2 %    Basophil Rel % 1.5 0.0 - 1.5 %    Neutrophils Absolute 2.92 1.70 - 7.00 10*3/mm3    Lymphocytes Absolute 0.97 0.70 - 3.10 10*3/mm3    Monocytes Absolute 0.46 0.10 - 0.90 10*3/mm3    Eosinophils Absolute 0.31 0.00 - 0.40 10*3/mm3    Basophils Absolute 0.07 0.00 - 0.20 10*3/mm3    Immature Granulocyte Rel % 0.4 0.0 - 0.5 %    Immature Grans Absolute 0.02 0.00 - 0.05 10*3/mm3    nRBC 0.0 0.0 - 0.2 /100 WBC         Assessment/Plan       Diagnoses and all orders for this visit:    1. Nausea (Primary)  -     Helicobacter Pylori, IgA IgG  IgM  -     Comprehensive Metabolic Panel  -     CBC & Differential  -     Urinalysis With Microscopic - Urine, Clean Catch    2. Diarrhea, unspecified type  -     Helicobacter Pylori, IgA IgG IgM  -     Comprehensive Metabolic Panel  -     CBC & Differential  -     Urinalysis With Microscopic - Urine, Clean Catch    3. Weight loss  -     Helicobacter Pylori, IgA IgG IgM  -     Comprehensive Metabolic Panel  -     CBC & Differential  -     Urinalysis With Microscopic - Urine, Clean Catch    4. H. pylori infection  -     Helicobacter Pylori, IgA IgG IgM  -     Comprehensive Metabolic Panel  -     CBC & Differential  -     Urinalysis With Microscopic - Urine, Clean Catch    Other orders  -     tiZANidine (ZANAFLEX) 4 MG tablet; Take 1 tablet by mouth Every Night.  Dispense: 90 tablet; Refill: 1  -     Unable To Void      Return in about 2 weeks (around 4/5/2022).          There are no Patient Instructions on file for this visit.     Sanjeev Rawls MD    Assessment/Plan

## 2022-03-23 DIAGNOSIS — R10.13 ABDOMINAL PAIN, EPIGASTRIC: Primary | ICD-10-CM

## 2022-03-23 LAB
ALBUMIN SERPL-MCNC: 4.2 G/DL (ref 3.5–5.2)
ALBUMIN/GLOB SERPL: 1.5 G/DL
ALP SERPL-CCNC: 69 U/L (ref 39–117)
ALT SERPL-CCNC: 16 U/L (ref 1–33)
APPEARANCE UR: CLEAR
AST SERPL-CCNC: 24 U/L (ref 1–32)
BACTERIA #/AREA URNS HPF: NORMAL /HPF
BASOPHILS # BLD AUTO: 0.07 10*3/MM3 (ref 0–0.2)
BASOPHILS NFR BLD AUTO: 1.5 % (ref 0–1.5)
BILIRUB SERPL-MCNC: 0.3 MG/DL (ref 0–1.2)
BILIRUB UR QL STRIP: NEGATIVE
BUN SERPL-MCNC: 7 MG/DL (ref 8–23)
BUN/CREAT SERPL: 6.1 (ref 7–25)
CALCIUM SERPL-MCNC: 9.5 MG/DL (ref 8.6–10.5)
CASTS URNS MICRO: NORMAL
CHLORIDE SERPL-SCNC: 96 MMOL/L (ref 98–107)
CO2 SERPL-SCNC: 23.9 MMOL/L (ref 22–29)
COLOR UR: YELLOW
CREAT SERPL-MCNC: 1.15 MG/DL (ref 0.57–1)
EGFRCR SERPLBLD CKD-EPI 2021: 52.6 ML/MIN/1.73
EOSINOPHIL # BLD AUTO: 0.31 10*3/MM3 (ref 0–0.4)
EOSINOPHIL NFR BLD AUTO: 6.5 % (ref 0.3–6.2)
EPI CELLS #/AREA URNS HPF: NORMAL /HPF
ERYTHROCYTE [DISTWIDTH] IN BLOOD BY AUTOMATED COUNT: 12.9 % (ref 12.3–15.4)
GLOBULIN SER CALC-MCNC: 2.8 GM/DL
GLUCOSE SERPL-MCNC: 111 MG/DL (ref 65–99)
GLUCOSE UR QL STRIP: NEGATIVE
H PYLORI IGA SER-ACNC: <9 UNITS (ref 0–8.9)
H PYLORI IGG SER IA-ACNC: 2.04 INDEX VALUE (ref 0–0.79)
H PYLORI IGM SER-ACNC: <9 UNITS (ref 0–8.9)
HCT VFR BLD AUTO: 39 % (ref 34–46.6)
HGB BLD-MCNC: 13.3 G/DL (ref 12–15.9)
HGB UR QL STRIP: NEGATIVE
IMM GRANULOCYTES # BLD AUTO: 0.02 10*3/MM3 (ref 0–0.05)
IMM GRANULOCYTES NFR BLD AUTO: 0.4 % (ref 0–0.5)
KETONES UR QL STRIP: NEGATIVE
LEUKOCYTE ESTERASE UR QL STRIP: NEGATIVE
LYMPHOCYTES # BLD AUTO: 0.97 10*3/MM3 (ref 0.7–3.1)
LYMPHOCYTES NFR BLD AUTO: 20.4 % (ref 19.6–45.3)
MCH RBC QN AUTO: 32.1 PG (ref 26.6–33)
MCHC RBC AUTO-ENTMCNC: 34.1 G/DL (ref 31.5–35.7)
MCV RBC AUTO: 94.2 FL (ref 79–97)
MONOCYTES # BLD AUTO: 0.46 10*3/MM3 (ref 0.1–0.9)
MONOCYTES NFR BLD AUTO: 9.7 % (ref 5–12)
NEUTROPHILS # BLD AUTO: 2.92 10*3/MM3 (ref 1.7–7)
NEUTROPHILS NFR BLD AUTO: 61.5 % (ref 42.7–76)
NITRITE UR QL STRIP: NEGATIVE
NRBC BLD AUTO-RTO: 0 /100 WBC (ref 0–0.2)
PH UR STRIP: 6 [PH] (ref 5–8)
PLATELET # BLD AUTO: 264 10*3/MM3 (ref 140–450)
POTASSIUM SERPL-SCNC: 4.8 MMOL/L (ref 3.5–5.2)
PROT SERPL-MCNC: 7 G/DL (ref 6–8.5)
PROT UR QL STRIP: NEGATIVE
RBC # BLD AUTO: 4.14 10*6/MM3 (ref 3.77–5.28)
RBC #/AREA URNS HPF: NORMAL /HPF
SODIUM SERPL-SCNC: 130 MMOL/L (ref 136–145)
SP GR UR STRIP: 1.01 (ref 1–1.03)
UNABLE TO VOID: NORMAL
UROBILINOGEN UR STRIP-MCNC: NORMAL MG/DL
WBC # BLD AUTO: 4.75 10*3/MM3 (ref 3.4–10.8)
WBC #/AREA URNS HPF: NORMAL /HPF

## 2022-03-23 NOTE — PROGRESS NOTES
Please let them know the renal function looks a bit better.  Sodium a bit lower. Lft normal. Wbc normal. Hope she is feeling some better.      H pylori is high on the antibody check. May be related.

## 2022-03-24 LAB — LIPASE SERPL-CCNC: 19 U/L (ref 13–60)

## 2022-03-29 ENCOUNTER — TELEPHONE (OUTPATIENT)
Dept: INTERNAL MEDICINE | Facility: CLINIC | Age: 67
End: 2022-03-29

## 2022-03-29 RX ORDER — DOXYCYCLINE HYCLATE 100 MG/1
100 CAPSULE ORAL 2 TIMES DAILY
Qty: 20 CAPSULE | Refills: 0 | Status: SHIPPED | OUTPATIENT
Start: 2022-03-29 | End: 2022-07-14

## 2022-04-06 DIAGNOSIS — F41.9 ANXIETY: ICD-10-CM

## 2022-04-06 DIAGNOSIS — J45.51 SEVERE PERSISTENT ASTHMA WITH EXACERBATION: Primary | ICD-10-CM

## 2022-04-07 RX ORDER — ALLOPURINOL 100 MG/1
100 TABLET ORAL DAILY
Qty: 90 TABLET | Refills: 3 | Status: SHIPPED | OUTPATIENT
Start: 2022-04-07 | End: 2022-04-08 | Stop reason: SDUPTHER

## 2022-04-07 RX ORDER — CLONAZEPAM 0.5 MG/1
0.5 TABLET ORAL NIGHTLY PRN
Qty: 30 TABLET | Refills: 1 | Status: CANCELLED | OUTPATIENT
Start: 2022-04-07

## 2022-04-07 RX ORDER — IPRATROPIUM BROMIDE 42 UG/1
2 SPRAY, METERED NASAL
Qty: 15 ML | Refills: 12 | Status: SHIPPED | OUTPATIENT
Start: 2022-04-07 | End: 2022-07-14

## 2022-04-07 NOTE — TELEPHONE ENCOUNTER
Rx Refill Note  Requested Prescriptions     Pending Prescriptions Disp Refills   • allopurinol (Zyloprim) 100 MG tablet 90 tablet 3     Sig: Take 1 tablet by mouth Daily.   • ipratropium (ATROVENT) 0.06 % nasal spray 15 mL 12     Sig: Spray 2 sprays into the nostril(s) every night at bedtime as directed by provider   • clonazePAM (KlonoPIN) 0.5 MG tablet 30 tablet 1     Sig: Take 1 tablet by mouth At Night As Needed      Last office visit with prescribing clinician: 3/22/2022      Next office visit with prescribing clinician: 4/11/2022            CHIQUI BYRNE MA  04/07/22, 11:36 EDT     UDS: N/A  CSA: 9/21/2021    Dr. Rawls is currently out of the office please advise

## 2022-04-08 DIAGNOSIS — F41.9 ANXIETY: ICD-10-CM

## 2022-04-08 RX ORDER — IPRATROPIUM BROMIDE AND ALBUTEROL SULFATE 2.5; .5 MG/3ML; MG/3ML
SOLUTION RESPIRATORY (INHALATION)
Qty: 270 ML | Refills: 3 | Status: SHIPPED | OUTPATIENT
Start: 2022-04-08 | End: 2023-03-13

## 2022-04-08 RX ORDER — CLONAZEPAM 0.5 MG/1
0.5 TABLET ORAL NIGHTLY PRN
Qty: 30 TABLET | Refills: 2 | Status: SHIPPED | OUTPATIENT
Start: 2022-04-08 | End: 2022-04-11 | Stop reason: SDUPTHER

## 2022-04-08 RX ORDER — IPRATROPIUM BROMIDE AND ALBUTEROL SULFATE 2.5; .5 MG/3ML; MG/3ML
3 SOLUTION RESPIRATORY (INHALATION)
Qty: 270 ML | Refills: 3 | Status: SHIPPED | OUTPATIENT
Start: 2022-04-08 | End: 2022-07-14

## 2022-04-08 RX ORDER — ALLOPURINOL 100 MG/1
100 TABLET ORAL DAILY
Qty: 90 TABLET | Refills: 3 | Status: SHIPPED | OUTPATIENT
Start: 2022-04-08

## 2022-04-11 ENCOUNTER — OFFICE VISIT (OUTPATIENT)
Dept: INTERNAL MEDICINE | Facility: CLINIC | Age: 67
End: 2022-04-11

## 2022-04-11 VITALS
SYSTOLIC BLOOD PRESSURE: 142 MMHG | WEIGHT: 223 LBS | DIASTOLIC BLOOD PRESSURE: 88 MMHG | TEMPERATURE: 96.6 F | RESPIRATION RATE: 16 BRPM | OXYGEN SATURATION: 95 % | HEART RATE: 85 BPM | BODY MASS INDEX: 38.07 KG/M2 | HEIGHT: 64 IN

## 2022-04-11 DIAGNOSIS — F41.9 ANXIETY: ICD-10-CM

## 2022-04-11 DIAGNOSIS — G47.33 OSA (OBSTRUCTIVE SLEEP APNEA): ICD-10-CM

## 2022-04-11 DIAGNOSIS — E87.1 HYPONATREMIA: ICD-10-CM

## 2022-04-11 DIAGNOSIS — N18.2 CHRONIC RENAL IMPAIRMENT, STAGE 2 (MILD): ICD-10-CM

## 2022-04-11 DIAGNOSIS — R11.0 NAUSEA: Primary | ICD-10-CM

## 2022-04-11 PROCEDURE — 99214 OFFICE O/P EST MOD 30 MIN: CPT | Performed by: INTERNAL MEDICINE

## 2022-04-11 RX ORDER — CLONAZEPAM 0.5 MG/1
0.5 TABLET ORAL NIGHTLY PRN
Qty: 30 TABLET | Refills: 2 | Status: SHIPPED | OUTPATIENT
Start: 2022-04-11 | End: 2022-09-25 | Stop reason: SDUPTHER

## 2022-04-11 NOTE — PROGRESS NOTES
Subjective     Patient ID: Raquel Mariee is a 66 y.o. female. Patient is here for management of multiple medical problems.     Chief Complaint   Patient presents with   • Nausea     History of Present Illness   nause to much on rybelsus.    Wt neutral.       Cough with tan sputum      Hypothyroid    Episodes of cramping after bm.   Seems to be realated to change in routine.  Has colonscopy recently    Off laxis except for infusions    barry not on cpap.  Sleep apnea. cpap was taken back.            The following portions of the patient's history were reviewed and updated as appropriate: allergies, current medications, past family history, past medical history, past social history, past surgical history and problem list.    Review of Systems    Current Outpatient Medications:   •  acetaminophen-codeine (TYLENOL #3) 300-30 MG per tablet, Take 1 tablet by mouth Every 4 (Four) Hours As Needed for Moderate Pain ., Disp: 10 tablet, Rfl: 0  •  albuterol sulfate  (90 Base) MCG/ACT inhaler, Inhale 2 puffs by mouth every 4-6 hours as needed for cough, wheeze, shortness of breath. Use with vortex spacer, Disp: 8.5 g, Rfl: 3  •  allopurinol (Zyloprim) 100 MG tablet, Take 1 tablet by mouth Daily., Disp: 90 tablet, Rfl: 3  •  Azelastine HCl 137 MCG/SPRAY solution, Instill 1-2 sprays in each nostril twice a day as needed, Disp: 30 mL, Rfl: 11  •  betamethasone valerate (VALISONE) 0.1 % cream, Apply topically to the appropriate area as directed Daily., Disp: 45 g, Rfl: 11  •  buPROPion SR (WELLBUTRIN SR) 150 MG 12 hr tablet, Take 1 tablet by mouth 2 (Two) Times a Day., Disp: 180 tablet, Rfl: 3  •  calcium carbonate (Antacid Maximum) 1000 MG chewable tablet, Chew 500 mg 3 (Three) Times a Day., Disp: , Rfl:   •  cholecalciferol (VITAMIN D3) 25 MCG (1000 UT) tablet, Take 1,000 Units by mouth Daily., Disp: , Rfl:   •  clonazePAM (KlonoPIN) 0.5 MG tablet, Take 1 tablet by mouth At Night As Needed, Disp: 30 tablet, Rfl: 2  •   Dextromethorphan-guaiFENesin (Mucus-DM)  MG tablet sustained-release 12 hour, Take  by mouth., Disp: , Rfl:   •  dicyclomine (BENTYL) 10 MG capsule, Take 1 capsule by mouth 3 (Three) Times a Day., Disp: 270 capsule, Rfl: 3  •  diphenhydrAMINE (Benadryl Allergy) 25 MG tablet, Take 1-2 tablets by mouth Every 4-6 Hours As Needed., Disp: 90 tablet, Rfl: 11  •  doxycycline (VIBRAMYCIN) 100 MG capsule, Take 1 capsule by mouth 2 (Two) Times a Day., Disp: 20 capsule, Rfl: 0  •  famotidine (PEPCID) 20 MG tablet, Take 20 mg by mouth 2 (Two) Times a Day., Disp: , Rfl:   •  fexofenadine (ALLEGRA) 180 MG tablet, Take 180 mg by mouth Daily., Disp: , Rfl:   •  fluticasone (FLONASE) 50 MCG/ACT nasal spray, Instill 2 sprays into each nostril daily., Disp: 16 g, Rfl: 11  •  fluticasone-salmeterol (Advair HFA) 230-21 MCG/ACT inhaler, Inhale 2 puffs by mouth 2 (Two) Times a Day., Disp: 12 g, Rfl: 11  •  furosemide (LASIX) 40 MG tablet, Take 1 tablet by mouth Daily. (Patient taking differently: Take 40 mg by mouth Daily. Two days a month.), Disp: 90 tablet, Rfl: 3  •  ipratropium (ATROVENT) 0.06 % nasal spray, Spray 2 sprays into the nostrils every night at bedtime as directed by provider, Disp: 15 mL, Rfl: 12  •  ipratropium-albuterol (DUO-NEB) 0.5-2.5 mg/3 ml nebulizer, Inhale contents of 1 vial (3mL) via nebulizer every 4-6 hours as needed for coughing, wheezing, or shortness of breath., Disp: 270 mL, Rfl: 3  •  ipratropium-albuterol (DUO-NEB) 0.5-2.5 mg/3 ml nebulizer, Inhale 1 vial (3 mL) by nebulization every 4-6 hours as needed for cough, wheezing, and shortness of breath, Disp: 270 mL, Rfl: 3  •  isosorbide mononitrate (IMDUR) 60 MG 24 hr tablet, Take 1 tablet by mouth Every Morning., Disp: 90 tablet, Rfl: 3  •  levothyroxine (SYNTHROID, LEVOTHROID) 50 MCG tablet, Take 1 tablet by mouth Daily., Disp: 90 tablet, Rfl: 3  •  Magnesium 250 MG tablet, Take 500 mg by mouth 2 (Two) Times a Day., Disp: , Rfl:   •  melatonin 5 MG  tablet tablet, Take 5 mg by mouth Every Night., Disp: , Rfl:   •  montelukast (SINGULAIR) 10 MG tablet, Take 1 tablet by mouth every night at bedtime., Disp: 30 tablet, Rfl: 11  •  multivitamin with minerals tablet tablet, Take 1 tablet by mouth Daily., Disp: , Rfl:   •  omeprazole (priLOSEC) 40 MG capsule, Take 1 capsule by mouth 2 (two) times a day., Disp: 180 capsule, Rfl: 3  •  ondansetron (ZOFRAN) 4 MG tablet, Take 1 tablet by mouth Every 8 (Eight) Hours As Needed for Nausea or Vomiting., Disp: 30 tablet, Rfl: 11  •  potassium citrate (UROCIT-K) 10 MEQ (1080 MG) CR tablet, Take 1 tablet by mouth Daily., Disp: 90 tablet, Rfl: 3  •  predniSONE (DELTASONE) 20 MG tablet, With Gamma globulin infusion -monthly, Disp: , Rfl:   •  promethazine-dextromethorphan (PROMETHAZINE-DM) 6.25-15 MG/5ML syrup, Take 5 mL by mouth 4 (Four) Times a Day As Needed for Cough., Disp: 240 mL, Rfl: 0  •  Semaglutide (Rybelsus) 14 MG tablet, Take 1 tablet by mouth Daily., Disp: 30 tablet, Rfl: 11  •  sodium-potassium-magnesium sulfates (Suprep Bowel Prep Kit) 17.5-3.13-1.6 GM/177ML solution oral solution, Take 1 bottle by mouth Every 12 (Twelve) Hours., Disp: 354 mL, Rfl: 0  •  Spacer/Aero-Holding Chambers (AeroChamber Plus Wali-Vu) misc, Use as directed with albuterol inhaler, Disp: 1 each, Rfl: 0  •  sucralfate (Carafate) 1 g tablet, Take 1 tablet by mouth 4 (Four) Times a Day. (Patient taking differently: Take 1 g by mouth 4 (Four) Times a Day As Needed.), Disp: 120 tablet, Rfl: 1  •  tiotropium bromide monohydrate (Spiriva Respimat) 2.5 MCG/ACT aerosol solution inhaler, Inhale 2 puffs by mouth daily, Disp: 4 g, Rfl: 11  •  tiZANidine (ZANAFLEX) 4 MG tablet, Take 1 tablet by mouth Every Night., Disp: 90 tablet, Rfl: 1  •  ubrogepant (ubrogepant) 50 MG tablet, Take 1 tablet by mouth 1 (One) Time As Needed for headache for up to 1 dose per day, Disp: 10 tablet, Rfl: 11  •  valsartan (DIOVAN) 160 MG tablet, Take 1 tablet by mouth Daily.,  "Disp: 90 tablet, Rfl: 3  •  vitamin B-12 (CYANOCOBALAMIN) 1000 MCG tablet, Take 1,000 mcg by mouth Daily., Disp: , Rfl:     Current Facility-Administered Medications:   •  heparin injection 500 Units, 5 mL, Intravenous, PRN, Sanjeev Rawls MD  •  sodium chloride 0.9 % flush 10 mL, 10 mL, Intravenous, Q12H, Sanjeev Rawls MD  •  sodium chloride 0.9 % flush 10 mL, 10 mL, Intravenous, PRN, Sanjeev Rawls MD  •  sodium chloride 0.9 % flush 20 mL, 20 mL, Intravenous, PRN, Sanjeev Rawls MD    Objective      Blood pressure 142/88, pulse 85, temperature 96.6 °F (35.9 °C), resp. rate 16, height 162.6 cm (64\"), weight 101 kg (223 lb), SpO2 95 %, not currently breastfeeding.    Physical Exam     General Appearance:    Alert, cooperative, no distress, appears stated age   Head:    Normocephalic, without obvious abnormality, atraumatic   Eyes:    PERRL, conjunctiva/corneas clear, EOM's intact   Ears:    Normal TM's and external ear canals, both ears   Nose:   Nares normal, septum midline, mucosa normal, no drainage   or sinus tenderness   Throat:   Lips, mucosa, and tongue normal; teeth and gums normal   Neck:   Supple, symmetrical, trachea midline, no adenopathy;        thyroid:  No enlargement/tenderness/nodules; no carotid    bruit or JVD   Back:     Symmetric, no curvature, ROM normal, no CVA tenderness   Lungs:     Clear to auscultation bilaterally, respirations unlabored   Chest wall:    No tenderness or deformity   Heart:    Regular rate and rhythm, S1 and S2 normal, 4/6  murmur,        rub or gallop   Abdomen:     Soft, non-tender, bowel sounds active all four quadrants,     no masses, no organomegaly   Extremities:   Extremities normal, atraumatic, no cyanosis or edema   Pulses:   2+ and symmetric all extremities   Skin:   Skin color, texture, turgor normal, no rashes or lesions   Lymph nodes:   Cervical, supraclavicular, and axillary nodes normal   Neurologic:   CNII-XII intact. Normal strength, sensation " and reflexes       throughout      Results for orders placed or performed in visit on 03/23/22   Lipase    Specimen: Blood   Result Value Ref Range    Lipase 19 13 - 60 U/L         Assessment/Plan     Echo set up for murmur.     James. Didn't get compliant with cpap originally. Would like to get if fixed.  Will set up with Dr Hoff.  Cri and hyponatremia stable.    Diagnoses and all orders for this visit:    1. Nausea (Primary)    2. Anxiety  -     clonazePAM (KlonoPIN) 0.5 MG tablet; Take 1 tablet by mouth At Night As Needed  Dispense: 30 tablet; Refill: 2    3. JAMES (obstructive sleep apnea)  -     Ambulatory Referral to Pulmonology    4. Hyponatremia    5. Chronic renal impairment, stage 2 (mild)      Return in about 3 months (around 7/11/2022).          There are no Patient Instructions on file for this visit.     Sanjeev Rawls MD    Assessment/Plan

## 2022-04-15 ENCOUNTER — HOSPITAL ENCOUNTER (OUTPATIENT)
Dept: INFUSION THERAPY | Facility: HOSPITAL | Age: 67
Setting detail: INFUSION SERIES
Discharge: HOME | End: 2022-04-15

## 2022-04-15 VITALS
OXYGEN SATURATION: 94 % | DIASTOLIC BLOOD PRESSURE: 72 MMHG | TEMPERATURE: 98.4 F | RESPIRATION RATE: 18 BRPM | SYSTOLIC BLOOD PRESSURE: 151 MMHG | HEART RATE: 95 BPM

## 2022-04-15 DIAGNOSIS — D80.1 COMMON VARIABLE AGAMMAGLOBULINEMIA: ICD-10-CM

## 2022-04-15 DIAGNOSIS — Z95.828 PORT-A-CATH IN PLACE: Primary | ICD-10-CM

## 2022-04-15 LAB
ALBUMIN SERPL-MCNC: 4.1 G/DL (ref 3.5–5.2)
ALBUMIN/GLOB SERPL: 1.6 G/DL
ALP SERPL-CCNC: 68 U/L (ref 39–117)
ALT SERPL W P-5'-P-CCNC: 16 U/L (ref 1–33)
ANION GAP SERPL CALCULATED.3IONS-SCNC: 7.6 MMOL/L (ref 5–15)
AST SERPL-CCNC: 24 U/L (ref 1–32)
BASOPHILS # BLD AUTO: 0.06 10*3/MM3 (ref 0–0.2)
BASOPHILS NFR BLD AUTO: 1.7 % (ref 0–1.5)
BILIRUB SERPL-MCNC: 0.3 MG/DL (ref 0–1.2)
BUN SERPL-MCNC: 8 MG/DL (ref 8–23)
BUN/CREAT SERPL: 7.6 (ref 7–25)
CALCIUM SPEC-SCNC: 9.1 MG/DL (ref 8.6–10.5)
CHLORIDE SERPL-SCNC: 100 MMOL/L (ref 98–107)
CO2 SERPL-SCNC: 23.4 MMOL/L (ref 22–29)
CREAT SERPL-MCNC: 1.05 MG/DL (ref 0.57–1)
DEPRECATED RDW RBC AUTO: 44 FL (ref 37–54)
EGFRCR SERPLBLD CKD-EPI 2021: 58.7 ML/MIN/1.73
EOSINOPHIL # BLD AUTO: 0.22 10*3/MM3 (ref 0–0.4)
EOSINOPHIL NFR BLD AUTO: 6.3 % (ref 0.3–6.2)
ERYTHROCYTE [DISTWIDTH] IN BLOOD BY AUTOMATED COUNT: 13 % (ref 12.3–15.4)
GLOBULIN UR ELPH-MCNC: 2.6 GM/DL
GLUCOSE SERPL-MCNC: 127 MG/DL (ref 65–99)
HBA1C MFR BLD: 5.7 % (ref 4.8–5.6)
HCT VFR BLD AUTO: 36.4 % (ref 34–46.6)
HGB BLD-MCNC: 12.9 G/DL (ref 12–15.9)
IMM GRANULOCYTES # BLD AUTO: 0.01 10*3/MM3 (ref 0–0.05)
IMM GRANULOCYTES NFR BLD AUTO: 0.3 % (ref 0–0.5)
LYMPHOCYTES # BLD AUTO: 1.17 10*3/MM3 (ref 0.7–3.1)
LYMPHOCYTES NFR BLD AUTO: 33.5 % (ref 19.6–45.3)
MCH RBC QN AUTO: 32.7 PG (ref 26.6–33)
MCHC RBC AUTO-ENTMCNC: 35.4 G/DL (ref 31.5–35.7)
MCV RBC AUTO: 92.2 FL (ref 79–97)
MONOCYTES # BLD AUTO: 0.28 10*3/MM3 (ref 0.1–0.9)
MONOCYTES NFR BLD AUTO: 8 % (ref 5–12)
NEUTROPHILS NFR BLD AUTO: 1.75 10*3/MM3 (ref 1.7–7)
NEUTROPHILS NFR BLD AUTO: 50.2 % (ref 42.7–76)
NRBC BLD AUTO-RTO: 0 /100 WBC (ref 0–0.2)
PLATELET # BLD AUTO: 248 10*3/MM3 (ref 140–450)
PMV BLD AUTO: 8.8 FL (ref 6–12)
POTASSIUM SERPL-SCNC: 4.5 MMOL/L (ref 3.5–5.2)
PROT SERPL-MCNC: 6.7 G/DL (ref 6–8.5)
RBC # BLD AUTO: 3.95 10*6/MM3 (ref 3.77–5.28)
SODIUM SERPL-SCNC: 131 MMOL/L (ref 136–145)
WBC NRBC COR # BLD: 3.49 10*3/MM3 (ref 3.4–10.8)

## 2022-04-15 PROCEDURE — 25010000002 HEPARIN LOCK FLUSH PER 10 UNITS: Performed by: ALLERGY & IMMUNOLOGY

## 2022-04-15 PROCEDURE — 96361 HYDRATE IV INFUSION ADD-ON: CPT

## 2022-04-15 PROCEDURE — 80053 COMPREHEN METABOLIC PANEL: CPT | Performed by: INTERNAL MEDICINE

## 2022-04-15 PROCEDURE — 25010000002 IMMUNE GLOBULIN (HUMAN) 20 GM/200ML SOLUTION: Performed by: ALLERGY & IMMUNOLOGY

## 2022-04-15 PROCEDURE — 83036 HEMOGLOBIN GLYCOSYLATED A1C: CPT | Performed by: INTERNAL MEDICINE

## 2022-04-15 PROCEDURE — 96366 THER/PROPH/DIAG IV INF ADDON: CPT

## 2022-04-15 PROCEDURE — 85025 COMPLETE CBC W/AUTO DIFF WBC: CPT | Performed by: INTERNAL MEDICINE

## 2022-04-15 PROCEDURE — 96365 THER/PROPH/DIAG IV INF INIT: CPT

## 2022-04-15 RX ORDER — SODIUM CHLORIDE 0.9 % (FLUSH) 0.9 %
10 SYRINGE (ML) INJECTION AS NEEDED
Status: DISCONTINUED | OUTPATIENT
Start: 2022-04-15 | End: 2022-04-17 | Stop reason: HOSPADM

## 2022-04-15 RX ORDER — PREDNISONE 20 MG/1
40 TABLET ORAL ONCE AS NEEDED
Status: CANCELLED
Start: 2022-05-13

## 2022-04-15 RX ORDER — PREDNISONE 20 MG/1
40 TABLET ORAL ONCE
Status: CANCELLED
Start: 2022-05-13 | End: 2022-05-13

## 2022-04-15 RX ORDER — EPINEPHRINE 1 MG/ML
0.3 INJECTION, SOLUTION INTRAMUSCULAR; SUBCUTANEOUS ONCE AS NEEDED
Status: CANCELLED
Start: 2022-05-13

## 2022-04-15 RX ORDER — PREDNISONE 20 MG/1
40 TABLET ORAL ONCE
Status: DISCONTINUED | OUTPATIENT
Start: 2022-04-15 | End: 2022-04-17 | Stop reason: HOSPADM

## 2022-04-15 RX ORDER — DIPHENHYDRAMINE HCL 25 MG
50 CAPSULE ORAL ONCE AS NEEDED
Status: CANCELLED
Start: 2022-05-13

## 2022-04-15 RX ORDER — DIPHENHYDRAMINE HCL 25 MG
50 CAPSULE ORAL ONCE
Status: CANCELLED
Start: 2022-05-13 | End: 2022-05-13

## 2022-04-15 RX ORDER — SODIUM CHLORIDE 0.9 % (FLUSH) 0.9 %
10 SYRINGE (ML) INJECTION AS NEEDED
Status: CANCELLED | OUTPATIENT
Start: 2022-04-15

## 2022-04-15 RX ORDER — HEPARIN SODIUM (PORCINE) LOCK FLUSH IV SOLN 100 UNIT/ML 100 UNIT/ML
500 SOLUTION INTRAVENOUS AS NEEDED
Status: DISCONTINUED | OUTPATIENT
Start: 2022-04-15 | End: 2022-04-17 | Stop reason: HOSPADM

## 2022-04-15 RX ORDER — EPINEPHRINE 1 MG/ML
0.3 INJECTION, SOLUTION INTRAMUSCULAR; SUBCUTANEOUS ONCE AS NEEDED
Status: DISCONTINUED | OUTPATIENT
Start: 2022-04-15 | End: 2022-04-17 | Stop reason: HOSPADM

## 2022-04-15 RX ORDER — PREDNISONE 20 MG/1
40 TABLET ORAL ONCE AS NEEDED
Status: DISCONTINUED | OUTPATIENT
Start: 2022-04-15 | End: 2022-04-17 | Stop reason: HOSPADM

## 2022-04-15 RX ORDER — METHYLPREDNISOLONE SODIUM SUCCINATE 125 MG/2ML
50 INJECTION, POWDER, LYOPHILIZED, FOR SOLUTION INTRAMUSCULAR; INTRAVENOUS ONCE AS NEEDED
Status: DISCONTINUED | OUTPATIENT
Start: 2022-04-15 | End: 2022-04-17 | Stop reason: HOSPADM

## 2022-04-15 RX ORDER — ACETAMINOPHEN 325 MG/1
325 TABLET ORAL ONCE
Status: DISCONTINUED | OUTPATIENT
Start: 2022-04-15 | End: 2022-04-17 | Stop reason: HOSPADM

## 2022-04-15 RX ORDER — METHYLPREDNISOLONE SODIUM SUCCINATE 125 MG/2ML
50 INJECTION, POWDER, LYOPHILIZED, FOR SOLUTION INTRAMUSCULAR; INTRAVENOUS ONCE AS NEEDED
Status: CANCELLED
Start: 2022-05-13

## 2022-04-15 RX ORDER — HEPARIN SODIUM (PORCINE) LOCK FLUSH IV SOLN 100 UNIT/ML 100 UNIT/ML
500 SOLUTION INTRAVENOUS AS NEEDED
Status: CANCELLED | OUTPATIENT
Start: 2022-04-15

## 2022-04-15 RX ORDER — DIPHENHYDRAMINE HCL 25 MG
50 CAPSULE ORAL ONCE
Status: DISCONTINUED | OUTPATIENT
Start: 2022-04-15 | End: 2022-04-17 | Stop reason: HOSPADM

## 2022-04-15 RX ORDER — DIPHENHYDRAMINE HCL 25 MG
50 CAPSULE ORAL ONCE AS NEEDED
Status: DISCONTINUED | OUTPATIENT
Start: 2022-04-15 | End: 2022-04-17 | Stop reason: HOSPADM

## 2022-04-15 RX ORDER — ACETAMINOPHEN 325 MG/1
325 TABLET ORAL ONCE
Status: CANCELLED | OUTPATIENT
Start: 2022-05-13

## 2022-04-15 RX ADMIN — HEPARIN 500 UNITS: 100 SYRINGE at 13:37

## 2022-04-15 RX ADMIN — IMMUNE GLOBULIN INFUSION (HUMAN) 20 G: 100 INJECTION, SOLUTION INTRAVENOUS; SUBCUTANEOUS at 09:40

## 2022-04-15 RX ADMIN — Medication 10 ML: at 13:37

## 2022-04-15 RX ADMIN — IMMUNE GLOBULIN INFUSION (HUMAN) 20 G: 100 INJECTION, SOLUTION INTRAVENOUS; SUBCUTANEOUS at 11:43

## 2022-04-15 RX ADMIN — SODIUM CHLORIDE 500 ML: 9 INJECTION, SOLUTION INTRAVENOUS at 08:46

## 2022-04-24 PROCEDURE — U0004 COV-19 TEST NON-CDC HGH THRU: HCPCS | Performed by: PERSONAL EMERGENCY RESPONSE ATTENDANT

## 2022-05-13 ENCOUNTER — HOSPITAL ENCOUNTER (OUTPATIENT)
Dept: INFUSION THERAPY | Facility: HOSPITAL | Age: 67
Setting detail: INFUSION SERIES
Discharge: HOME | End: 2022-05-13

## 2022-05-13 VITALS
SYSTOLIC BLOOD PRESSURE: 145 MMHG | RESPIRATION RATE: 18 BRPM | OXYGEN SATURATION: 97 % | DIASTOLIC BLOOD PRESSURE: 85 MMHG | WEIGHT: 214 LBS | HEART RATE: 83 BPM | BODY MASS INDEX: 36.73 KG/M2 | TEMPERATURE: 97.9 F

## 2022-05-13 DIAGNOSIS — D80.1 COMMON VARIABLE AGAMMAGLOBULINEMIA: ICD-10-CM

## 2022-05-13 DIAGNOSIS — Z95.828 PORT-A-CATH IN PLACE: Primary | ICD-10-CM

## 2022-05-13 PROCEDURE — 25010000002 IMMUNE GLOBULIN (HUMAN) 20 GM/200ML SOLUTION: Performed by: ALLERGY & IMMUNOLOGY

## 2022-05-13 PROCEDURE — 96361 HYDRATE IV INFUSION ADD-ON: CPT

## 2022-05-13 PROCEDURE — 96365 THER/PROPH/DIAG IV INF INIT: CPT

## 2022-05-13 PROCEDURE — 96366 THER/PROPH/DIAG IV INF ADDON: CPT

## 2022-05-13 PROCEDURE — 25010000002 HEPARIN LOCK FLUSH PER 10 UNITS: Performed by: ALLERGY & IMMUNOLOGY

## 2022-05-13 RX ORDER — EPINEPHRINE 1 MG/ML
0.3 INJECTION, SOLUTION INTRAMUSCULAR; SUBCUTANEOUS ONCE AS NEEDED
Status: DISCONTINUED | OUTPATIENT
Start: 2022-05-13 | End: 2022-05-15 | Stop reason: HOSPADM

## 2022-05-13 RX ORDER — EPINEPHRINE 1 MG/ML
0.3 INJECTION, SOLUTION INTRAMUSCULAR; SUBCUTANEOUS ONCE AS NEEDED
Status: CANCELLED
Start: 2022-06-10

## 2022-05-13 RX ORDER — HEPARIN SODIUM (PORCINE) LOCK FLUSH IV SOLN 100 UNIT/ML 100 UNIT/ML
500 SOLUTION INTRAVENOUS AS NEEDED
Status: DISCONTINUED | OUTPATIENT
Start: 2022-05-13 | End: 2022-05-15 | Stop reason: HOSPADM

## 2022-05-13 RX ORDER — SODIUM CHLORIDE 0.9 % (FLUSH) 0.9 %
10 SYRINGE (ML) INJECTION AS NEEDED
Status: CANCELLED | OUTPATIENT
Start: 2022-05-13

## 2022-05-13 RX ORDER — DIPHENHYDRAMINE HCL 25 MG
50 CAPSULE ORAL ONCE AS NEEDED
Status: DISCONTINUED | OUTPATIENT
Start: 2022-05-13 | End: 2022-05-15 | Stop reason: HOSPADM

## 2022-05-13 RX ORDER — HEPARIN SODIUM (PORCINE) LOCK FLUSH IV SOLN 100 UNIT/ML 100 UNIT/ML
500 SOLUTION INTRAVENOUS AS NEEDED
Status: CANCELLED | OUTPATIENT
Start: 2022-05-13

## 2022-05-13 RX ORDER — PREDNISONE 20 MG/1
40 TABLET ORAL ONCE AS NEEDED
Status: DISCONTINUED | OUTPATIENT
Start: 2022-05-13 | End: 2022-05-15 | Stop reason: HOSPADM

## 2022-05-13 RX ORDER — DIPHENHYDRAMINE HCL 25 MG
50 CAPSULE ORAL ONCE AS NEEDED
Status: CANCELLED
Start: 2022-06-10

## 2022-05-13 RX ORDER — DIPHENHYDRAMINE HCL 25 MG
50 CAPSULE ORAL ONCE
Status: CANCELLED
Start: 2022-06-10 | End: 2022-06-10

## 2022-05-13 RX ORDER — ACETAMINOPHEN 325 MG/1
325 TABLET ORAL ONCE
Status: DISCONTINUED | OUTPATIENT
Start: 2022-05-13 | End: 2022-05-15 | Stop reason: HOSPADM

## 2022-05-13 RX ORDER — DIPHENHYDRAMINE HCL 25 MG
50 CAPSULE ORAL ONCE
Status: DISCONTINUED | OUTPATIENT
Start: 2022-05-13 | End: 2022-05-15 | Stop reason: HOSPADM

## 2022-05-13 RX ORDER — SODIUM CHLORIDE 0.9 % (FLUSH) 0.9 %
10 SYRINGE (ML) INJECTION AS NEEDED
Status: DISCONTINUED | OUTPATIENT
Start: 2022-05-13 | End: 2022-05-15 | Stop reason: HOSPADM

## 2022-05-13 RX ORDER — PREDNISONE 20 MG/1
40 TABLET ORAL ONCE AS NEEDED
Status: CANCELLED
Start: 2022-06-10

## 2022-05-13 RX ORDER — ACETAMINOPHEN 325 MG/1
325 TABLET ORAL ONCE
Status: CANCELLED | OUTPATIENT
Start: 2022-06-10

## 2022-05-13 RX ORDER — PREDNISONE 20 MG/1
40 TABLET ORAL ONCE
Status: DISCONTINUED | OUTPATIENT
Start: 2022-05-13 | End: 2022-05-15 | Stop reason: HOSPADM

## 2022-05-13 RX ORDER — PREDNISONE 20 MG/1
40 TABLET ORAL ONCE
Status: CANCELLED
Start: 2022-06-10 | End: 2022-06-10

## 2022-05-13 RX ORDER — METHYLPREDNISOLONE SODIUM SUCCINATE 125 MG/2ML
50 INJECTION, POWDER, LYOPHILIZED, FOR SOLUTION INTRAMUSCULAR; INTRAVENOUS ONCE AS NEEDED
Status: CANCELLED
Start: 2022-06-10

## 2022-05-13 RX ORDER — METHYLPREDNISOLONE SODIUM SUCCINATE 125 MG/2ML
50 INJECTION, POWDER, LYOPHILIZED, FOR SOLUTION INTRAMUSCULAR; INTRAVENOUS ONCE AS NEEDED
Status: DISCONTINUED | OUTPATIENT
Start: 2022-05-13 | End: 2022-05-15 | Stop reason: HOSPADM

## 2022-05-13 RX ADMIN — SODIUM CHLORIDE 500 ML: 9 INJECTION, SOLUTION INTRAVENOUS at 08:16

## 2022-05-13 RX ADMIN — IMMUNE GLOBULIN INFUSION (HUMAN) 20 G: 100 INJECTION, SOLUTION INTRAVENOUS; SUBCUTANEOUS at 11:11

## 2022-05-13 RX ADMIN — Medication 10 ML: at 13:18

## 2022-05-13 RX ADMIN — Medication 500 UNITS: at 13:18

## 2022-05-13 RX ADMIN — IMMUNE GLOBULIN INFUSION (HUMAN) 20 G: 100 INJECTION, SOLUTION INTRAVENOUS; SUBCUTANEOUS at 08:55

## 2022-05-19 ENCOUNTER — PRIOR AUTHORIZATION (OUTPATIENT)
Dept: INTERNAL MEDICINE | Facility: CLINIC | Age: 67
End: 2022-05-19

## 2022-05-24 ENCOUNTER — TELEPHONE (OUTPATIENT)
Dept: INTERNAL MEDICINE | Facility: CLINIC | Age: 67
End: 2022-05-24

## 2022-05-24 NOTE — TELEPHONE ENCOUNTER
Spoke with Jhonatan, advised patient cannot tolerate Triptans.  Case has been sent to medical director for review.

## 2022-05-24 NOTE — TELEPHONE ENCOUNTER
Caller: [email or web address]     Relationship: [facility]     Best call back number: [phone]  EX [phone]    Requested Prescriptions:   Requested Prescriptions      No prescriptions requested or ordered in this encounter         Additional details provided by patient:  STATES THERE IS AN APPEAL FOR THE MEDICATION   THEY ARE TRYING TO GET THE MISSING INFORMATION FOR   UBRELVY 50 MG     OR [phone]

## 2022-06-06 RX ORDER — LEVOTHYROXINE SODIUM 0.05 MG/1
50 TABLET ORAL DAILY
Qty: 90 TABLET | Refills: 3 | Status: SHIPPED | OUTPATIENT
Start: 2022-06-06

## 2022-06-06 RX ORDER — FUROSEMIDE 40 MG/1
40 TABLET ORAL DAILY
Qty: 30 TABLET | Refills: 1 | Status: SHIPPED | OUTPATIENT
Start: 2022-06-06

## 2022-06-06 NOTE — TELEPHONE ENCOUNTER
Rx Refill Note  Requested Prescriptions     Pending Prescriptions Disp Refills   • levothyroxine (SYNTHROID, LEVOTHROID) 50 MCG tablet [Pharmacy Med Name: Levothyroxine Sodium 50 MCG Oral Tablet (SYNTHROID, LEVOTHROID)] 90 tablet 3     Sig: Take 1 tablet by mouth Daily.   • furosemide (LASIX) 40 MG tablet 30 tablet 1     Sig: Take 1 tablet by mouth Daily. Two days a month.      Last office visit with prescribing clinician: 4/11/2022      Next office visit with prescribing clinician: 7/14/2022            Lula Li LPN  06/06/22, 10:42 EDT

## 2022-06-10 ENCOUNTER — HOSPITAL ENCOUNTER (OUTPATIENT)
Dept: INFUSION THERAPY | Facility: HOSPITAL | Age: 67
Setting detail: INFUSION SERIES
Discharge: HOME | End: 2022-06-10

## 2022-06-10 VITALS
HEART RATE: 77 BPM | SYSTOLIC BLOOD PRESSURE: 146 MMHG | BODY MASS INDEX: 36.73 KG/M2 | RESPIRATION RATE: 20 BRPM | DIASTOLIC BLOOD PRESSURE: 65 MMHG | TEMPERATURE: 98.2 F | OXYGEN SATURATION: 95 % | WEIGHT: 214 LBS

## 2022-06-10 DIAGNOSIS — Z95.828 PORT-A-CATH IN PLACE: ICD-10-CM

## 2022-06-10 DIAGNOSIS — D80.1 COMMON VARIABLE AGAMMAGLOBULINEMIA: Primary | ICD-10-CM

## 2022-06-10 LAB
ALBUMIN SERPL-MCNC: 4.1 G/DL (ref 3.5–5.2)
ALBUMIN/GLOB SERPL: 1.6 G/DL
ALP SERPL-CCNC: 71 U/L (ref 39–117)
ALT SERPL W P-5'-P-CCNC: 17 U/L (ref 1–33)
ANION GAP SERPL CALCULATED.3IONS-SCNC: 12.5 MMOL/L (ref 5–15)
AST SERPL-CCNC: 25 U/L (ref 1–32)
BASOPHILS # BLD AUTO: 0.05 10*3/MM3 (ref 0–0.2)
BASOPHILS NFR BLD AUTO: 1 % (ref 0–1.5)
BILIRUB SERPL-MCNC: 0.2 MG/DL (ref 0–1.2)
BUN SERPL-MCNC: 12 MG/DL (ref 8–23)
BUN/CREAT SERPL: 10.3 (ref 7–25)
CALCIUM SPEC-SCNC: 9.1 MG/DL (ref 8.6–10.5)
CHLORIDE SERPL-SCNC: 100 MMOL/L (ref 98–107)
CO2 SERPL-SCNC: 21.5 MMOL/L (ref 22–29)
CREAT SERPL-MCNC: 1.16 MG/DL (ref 0.57–1)
DEPRECATED RDW RBC AUTO: 46.1 FL (ref 37–54)
EGFRCR SERPLBLD CKD-EPI 2021: 52.1 ML/MIN/1.73
EOSINOPHIL # BLD AUTO: 0.17 10*3/MM3 (ref 0–0.4)
EOSINOPHIL NFR BLD AUTO: 3.5 % (ref 0.3–6.2)
ERYTHROCYTE [DISTWIDTH] IN BLOOD BY AUTOMATED COUNT: 13.4 % (ref 12.3–15.4)
GLOBULIN UR ELPH-MCNC: 2.5 GM/DL
GLUCOSE SERPL-MCNC: 188 MG/DL (ref 65–99)
HCT VFR BLD AUTO: 35.7 % (ref 34–46.6)
HGB BLD-MCNC: 12.6 G/DL (ref 12–15.9)
IGG1 SER-MCNC: 972 MG/DL (ref 700–1600)
IMM GRANULOCYTES # BLD AUTO: 0.02 10*3/MM3 (ref 0–0.05)
IMM GRANULOCYTES NFR BLD AUTO: 0.4 % (ref 0–0.5)
LYMPHOCYTES # BLD AUTO: 1.11 10*3/MM3 (ref 0.7–3.1)
LYMPHOCYTES NFR BLD AUTO: 22.7 % (ref 19.6–45.3)
MCH RBC QN AUTO: 33 PG (ref 26.6–33)
MCHC RBC AUTO-ENTMCNC: 35.3 G/DL (ref 31.5–35.7)
MCV RBC AUTO: 93.5 FL (ref 79–97)
MONOCYTES # BLD AUTO: 0.24 10*3/MM3 (ref 0.1–0.9)
MONOCYTES NFR BLD AUTO: 4.9 % (ref 5–12)
NEUTROPHILS NFR BLD AUTO: 3.29 10*3/MM3 (ref 1.7–7)
NEUTROPHILS NFR BLD AUTO: 67.5 % (ref 42.7–76)
NRBC BLD AUTO-RTO: 0 /100 WBC (ref 0–0.2)
PLATELET # BLD AUTO: 241 10*3/MM3 (ref 140–450)
PMV BLD AUTO: 8.7 FL (ref 6–12)
POTASSIUM SERPL-SCNC: 4.1 MMOL/L (ref 3.5–5.2)
PROT SERPL-MCNC: 6.6 G/DL (ref 6–8.5)
RBC # BLD AUTO: 3.82 10*6/MM3 (ref 3.77–5.28)
SODIUM SERPL-SCNC: 134 MMOL/L (ref 136–145)
WBC NRBC COR # BLD: 4.88 10*3/MM3 (ref 3.4–10.8)

## 2022-06-10 PROCEDURE — 25010000002 HEPARIN LOCK FLUSH PER 10 UNITS: Performed by: ALLERGY & IMMUNOLOGY

## 2022-06-10 PROCEDURE — 96366 THER/PROPH/DIAG IV INF ADDON: CPT

## 2022-06-10 PROCEDURE — 82784 ASSAY IGA/IGD/IGG/IGM EACH: CPT | Performed by: ALLERGY & IMMUNOLOGY

## 2022-06-10 PROCEDURE — 96365 THER/PROPH/DIAG IV INF INIT: CPT

## 2022-06-10 PROCEDURE — 25010000002 IMMUNE GLOBULIN (HUMAN) 20 GM/200ML SOLUTION: Performed by: ALLERGY & IMMUNOLOGY

## 2022-06-10 PROCEDURE — 80053 COMPREHEN METABOLIC PANEL: CPT | Performed by: ALLERGY & IMMUNOLOGY

## 2022-06-10 PROCEDURE — 96361 HYDRATE IV INFUSION ADD-ON: CPT

## 2022-06-10 PROCEDURE — 85025 COMPLETE CBC W/AUTO DIFF WBC: CPT | Performed by: ALLERGY & IMMUNOLOGY

## 2022-06-10 RX ORDER — DIPHENHYDRAMINE HCL 25 MG
50 CAPSULE ORAL ONCE AS NEEDED
Status: CANCELLED
Start: 2022-07-08

## 2022-06-10 RX ORDER — DIPHENHYDRAMINE HCL 25 MG
50 CAPSULE ORAL ONCE AS NEEDED
Status: DISCONTINUED | OUTPATIENT
Start: 2022-06-10 | End: 2022-06-12 | Stop reason: HOSPADM

## 2022-06-10 RX ORDER — PREDNISONE 20 MG/1
40 TABLET ORAL ONCE AS NEEDED
Status: DISCONTINUED | OUTPATIENT
Start: 2022-06-10 | End: 2022-06-12 | Stop reason: HOSPADM

## 2022-06-10 RX ORDER — METHYLPREDNISOLONE SODIUM SUCCINATE 125 MG/2ML
50 INJECTION, POWDER, LYOPHILIZED, FOR SOLUTION INTRAMUSCULAR; INTRAVENOUS ONCE AS NEEDED
Status: CANCELLED
Start: 2022-07-08

## 2022-06-10 RX ORDER — PREDNISONE 20 MG/1
40 TABLET ORAL ONCE AS NEEDED
Status: CANCELLED
Start: 2022-07-08

## 2022-06-10 RX ORDER — PREDNISONE 20 MG/1
40 TABLET ORAL ONCE
Status: DISCONTINUED | OUTPATIENT
Start: 2022-06-10 | End: 2022-06-12 | Stop reason: HOSPADM

## 2022-06-10 RX ORDER — EPINEPHRINE 1 MG/ML
0.3 INJECTION, SOLUTION INTRAMUSCULAR; SUBCUTANEOUS ONCE AS NEEDED
Status: DISCONTINUED | OUTPATIENT
Start: 2022-06-10 | End: 2022-06-12 | Stop reason: HOSPADM

## 2022-06-10 RX ORDER — SODIUM CHLORIDE 0.9 % (FLUSH) 0.9 %
10 SYRINGE (ML) INJECTION AS NEEDED
Status: CANCELLED | OUTPATIENT
Start: 2022-06-10

## 2022-06-10 RX ORDER — SODIUM CHLORIDE 0.9 % (FLUSH) 0.9 %
10 SYRINGE (ML) INJECTION AS NEEDED
Status: DISCONTINUED | OUTPATIENT
Start: 2022-06-10 | End: 2022-06-12 | Stop reason: HOSPADM

## 2022-06-10 RX ORDER — ACETAMINOPHEN 325 MG/1
325 TABLET ORAL ONCE
Status: DISCONTINUED | OUTPATIENT
Start: 2022-06-10 | End: 2022-06-12 | Stop reason: HOSPADM

## 2022-06-10 RX ORDER — METHYLPREDNISOLONE SODIUM SUCCINATE 125 MG/2ML
50 INJECTION, POWDER, LYOPHILIZED, FOR SOLUTION INTRAMUSCULAR; INTRAVENOUS ONCE AS NEEDED
Status: DISCONTINUED | OUTPATIENT
Start: 2022-06-10 | End: 2022-06-12 | Stop reason: HOSPADM

## 2022-06-10 RX ORDER — EPINEPHRINE 1 MG/ML
0.3 INJECTION, SOLUTION INTRAMUSCULAR; SUBCUTANEOUS ONCE AS NEEDED
Status: CANCELLED
Start: 2022-07-08

## 2022-06-10 RX ORDER — HEPARIN SODIUM (PORCINE) LOCK FLUSH IV SOLN 100 UNIT/ML 100 UNIT/ML
500 SOLUTION INTRAVENOUS AS NEEDED
Status: CANCELLED | OUTPATIENT
Start: 2022-06-10

## 2022-06-10 RX ORDER — PREDNISONE 20 MG/1
40 TABLET ORAL ONCE
Status: CANCELLED
Start: 2022-07-08 | End: 2022-07-08

## 2022-06-10 RX ORDER — DIPHENHYDRAMINE HCL 25 MG
50 CAPSULE ORAL ONCE
Status: CANCELLED
Start: 2022-07-08 | End: 2022-07-08

## 2022-06-10 RX ORDER — HEPARIN SODIUM (PORCINE) LOCK FLUSH IV SOLN 100 UNIT/ML 100 UNIT/ML
500 SOLUTION INTRAVENOUS AS NEEDED
Status: DISCONTINUED | OUTPATIENT
Start: 2022-06-10 | End: 2022-06-12 | Stop reason: HOSPADM

## 2022-06-10 RX ORDER — ACETAMINOPHEN 325 MG/1
325 TABLET ORAL ONCE
Status: CANCELLED | OUTPATIENT
Start: 2022-07-08

## 2022-06-10 RX ORDER — DIPHENHYDRAMINE HCL 25 MG
50 CAPSULE ORAL ONCE
Status: DISCONTINUED | OUTPATIENT
Start: 2022-06-10 | End: 2022-06-12 | Stop reason: HOSPADM

## 2022-06-10 RX ADMIN — Medication 10 ML: at 12:31

## 2022-06-10 RX ADMIN — IMMUNE GLOBULIN INFUSION (HUMAN) 20 G: 100 INJECTION, SOLUTION INTRAVENOUS; SUBCUTANEOUS at 09:10

## 2022-06-10 RX ADMIN — IMMUNE GLOBULIN INFUSION (HUMAN) 20 G: 100 INJECTION, SOLUTION INTRAVENOUS; SUBCUTANEOUS at 10:58

## 2022-06-10 RX ADMIN — SODIUM CHLORIDE 500 ML: 9 INJECTION, SOLUTION INTRAVENOUS at 08:35

## 2022-06-10 RX ADMIN — HEPARIN 500 UNITS: 100 SYRINGE at 12:32

## 2022-07-08 ENCOUNTER — APPOINTMENT (OUTPATIENT)
Dept: INFUSION THERAPY | Facility: HOSPITAL | Age: 67
End: 2022-07-08

## 2022-07-11 ENCOUNTER — HOSPITAL ENCOUNTER (OUTPATIENT)
Dept: INFUSION THERAPY | Facility: HOSPITAL | Age: 67
Setting detail: INFUSION SERIES
Discharge: HOME | End: 2022-07-11

## 2022-07-11 VITALS
HEART RATE: 79 BPM | OXYGEN SATURATION: 96 % | DIASTOLIC BLOOD PRESSURE: 82 MMHG | TEMPERATURE: 97.7 F | RESPIRATION RATE: 18 BRPM | SYSTOLIC BLOOD PRESSURE: 129 MMHG

## 2022-07-11 DIAGNOSIS — D80.1 COMMON VARIABLE AGAMMAGLOBULINEMIA: ICD-10-CM

## 2022-07-11 DIAGNOSIS — Z95.828 PORT-A-CATH IN PLACE: Primary | ICD-10-CM

## 2022-07-11 PROCEDURE — 96366 THER/PROPH/DIAG IV INF ADDON: CPT

## 2022-07-11 PROCEDURE — 25010000002 HEPARIN LOCK FLUSH PER 10 UNITS: Performed by: ALLERGY & IMMUNOLOGY

## 2022-07-11 PROCEDURE — 96365 THER/PROPH/DIAG IV INF INIT: CPT

## 2022-07-11 PROCEDURE — 96361 HYDRATE IV INFUSION ADD-ON: CPT

## 2022-07-11 PROCEDURE — 25010000002 IMMUNE GLOBULIN (HUMAN) 20 GM/200ML SOLUTION: Performed by: ALLERGY & IMMUNOLOGY

## 2022-07-11 RX ORDER — METHYLPREDNISOLONE SODIUM SUCCINATE 125 MG/2ML
50 INJECTION, POWDER, LYOPHILIZED, FOR SOLUTION INTRAMUSCULAR; INTRAVENOUS ONCE AS NEEDED
Status: CANCELLED
Start: 2022-08-01

## 2022-07-11 RX ORDER — PREDNISONE 20 MG/1
40 TABLET ORAL ONCE
Status: CANCELLED
Start: 2022-08-01 | End: 2022-08-01

## 2022-07-11 RX ORDER — DIPHENHYDRAMINE HCL 25 MG
50 CAPSULE ORAL ONCE AS NEEDED
Status: DISCONTINUED | OUTPATIENT
Start: 2022-07-11 | End: 2022-07-13 | Stop reason: HOSPADM

## 2022-07-11 RX ORDER — SODIUM CHLORIDE 0.9 % (FLUSH) 0.9 %
10 SYRINGE (ML) INJECTION AS NEEDED
Status: DISCONTINUED | OUTPATIENT
Start: 2022-07-11 | End: 2022-07-13 | Stop reason: HOSPADM

## 2022-07-11 RX ORDER — PREDNISONE 20 MG/1
40 TABLET ORAL ONCE
Status: DISCONTINUED | OUTPATIENT
Start: 2022-07-11 | End: 2022-07-13 | Stop reason: HOSPADM

## 2022-07-11 RX ORDER — PREDNISONE 20 MG/1
40 TABLET ORAL ONCE AS NEEDED
Status: CANCELLED
Start: 2022-08-01

## 2022-07-11 RX ORDER — ACETAMINOPHEN 325 MG/1
325 TABLET ORAL ONCE
Status: DISCONTINUED | OUTPATIENT
Start: 2022-07-11 | End: 2022-07-13 | Stop reason: HOSPADM

## 2022-07-11 RX ORDER — SODIUM CHLORIDE 0.9 % (FLUSH) 0.9 %
10 SYRINGE (ML) INJECTION AS NEEDED
Status: CANCELLED | OUTPATIENT
Start: 2022-07-11

## 2022-07-11 RX ORDER — DIPHENHYDRAMINE HCL 25 MG
50 CAPSULE ORAL ONCE AS NEEDED
Status: CANCELLED
Start: 2022-08-01

## 2022-07-11 RX ORDER — EPINEPHRINE 1 MG/ML
0.3 INJECTION, SOLUTION INTRAMUSCULAR; SUBCUTANEOUS ONCE AS NEEDED
Status: DISCONTINUED | OUTPATIENT
Start: 2022-07-11 | End: 2022-07-13 | Stop reason: HOSPADM

## 2022-07-11 RX ORDER — DIPHENHYDRAMINE HCL 25 MG
50 CAPSULE ORAL ONCE
Status: DISCONTINUED | OUTPATIENT
Start: 2022-07-11 | End: 2022-07-13 | Stop reason: HOSPADM

## 2022-07-11 RX ORDER — DIPHENHYDRAMINE HCL 25 MG
50 CAPSULE ORAL ONCE
Status: CANCELLED
Start: 2022-08-01 | End: 2022-08-01

## 2022-07-11 RX ORDER — METHYLPREDNISOLONE SODIUM SUCCINATE 125 MG/2ML
50 INJECTION, POWDER, LYOPHILIZED, FOR SOLUTION INTRAMUSCULAR; INTRAVENOUS ONCE AS NEEDED
Status: DISCONTINUED | OUTPATIENT
Start: 2022-07-11 | End: 2022-07-13 | Stop reason: HOSPADM

## 2022-07-11 RX ORDER — PREDNISONE 20 MG/1
40 TABLET ORAL ONCE AS NEEDED
Status: DISCONTINUED | OUTPATIENT
Start: 2022-07-11 | End: 2022-07-13 | Stop reason: HOSPADM

## 2022-07-11 RX ORDER — EPINEPHRINE 1 MG/ML
0.3 INJECTION, SOLUTION INTRAMUSCULAR; SUBCUTANEOUS ONCE AS NEEDED
Status: CANCELLED
Start: 2022-08-01

## 2022-07-11 RX ORDER — ACETAMINOPHEN 325 MG/1
325 TABLET ORAL ONCE
Status: CANCELLED | OUTPATIENT
Start: 2022-08-01

## 2022-07-11 RX ORDER — HEPARIN SODIUM (PORCINE) LOCK FLUSH IV SOLN 100 UNIT/ML 100 UNIT/ML
500 SOLUTION INTRAVENOUS AS NEEDED
Status: CANCELLED | OUTPATIENT
Start: 2022-07-11

## 2022-07-11 RX ORDER — HEPARIN SODIUM (PORCINE) LOCK FLUSH IV SOLN 100 UNIT/ML 100 UNIT/ML
500 SOLUTION INTRAVENOUS AS NEEDED
Status: DISCONTINUED | OUTPATIENT
Start: 2022-07-11 | End: 2022-07-13 | Stop reason: HOSPADM

## 2022-07-11 RX ADMIN — IMMUNE GLOBULIN INFUSION (HUMAN) 20 G: 100 INJECTION, SOLUTION INTRAVENOUS; SUBCUTANEOUS at 11:27

## 2022-07-11 RX ADMIN — Medication 500 UNITS: at 12:59

## 2022-07-11 RX ADMIN — Medication 10 ML: at 12:59

## 2022-07-11 RX ADMIN — SODIUM CHLORIDE 500 ML: 9 INJECTION, SOLUTION INTRAVENOUS at 08:43

## 2022-07-11 RX ADMIN — IMMUNE GLOBULIN INFUSION (HUMAN) 20 G: 100 INJECTION, SOLUTION INTRAVENOUS; SUBCUTANEOUS at 09:32

## 2022-07-12 ENCOUNTER — PATIENT MESSAGE (OUTPATIENT)
Dept: INTERNAL MEDICINE | Facility: CLINIC | Age: 67
End: 2022-07-12

## 2022-07-12 RX ORDER — POTASSIUM CITRATE 10 MEQ/1
10 TABLET, EXTENDED RELEASE ORAL DAILY
Qty: 90 TABLET | Refills: 3 | Status: CANCELLED | OUTPATIENT
Start: 2022-07-12

## 2022-07-13 RX ORDER — POTASSIUM CITRATE 10 MEQ/1
10 TABLET, EXTENDED RELEASE ORAL DAILY
Qty: 90 TABLET | Refills: 3 | Status: SHIPPED | OUTPATIENT
Start: 2022-07-13

## 2022-07-13 NOTE — TELEPHONE ENCOUNTER
From: Raquel Mariee  To: Sanjeev Rawls MD  Sent: 7/12/2022 7:37 PM EDT  Subject: Potassium citrate    Need refills please. Lilian [phone]

## 2022-07-14 ENCOUNTER — OFFICE VISIT (OUTPATIENT)
Dept: INTERNAL MEDICINE | Facility: CLINIC | Age: 67
End: 2022-07-14

## 2022-07-14 VITALS
DIASTOLIC BLOOD PRESSURE: 84 MMHG | RESPIRATION RATE: 16 BRPM | HEIGHT: 64 IN | BODY MASS INDEX: 36.02 KG/M2 | OXYGEN SATURATION: 97 % | SYSTOLIC BLOOD PRESSURE: 118 MMHG | TEMPERATURE: 98.2 F | WEIGHT: 211 LBS | HEART RATE: 76 BPM

## 2022-07-14 DIAGNOSIS — N18.2 CHRONIC RENAL IMPAIRMENT, STAGE 2 (MILD): ICD-10-CM

## 2022-07-14 DIAGNOSIS — Z79.4 TYPE 2 DIABETES MELLITUS WITHOUT COMPLICATION, WITH LONG-TERM CURRENT USE OF INSULIN: ICD-10-CM

## 2022-07-14 DIAGNOSIS — G89.29 CHRONIC RIGHT SHOULDER PAIN: ICD-10-CM

## 2022-07-14 DIAGNOSIS — M25.511 CHRONIC RIGHT SHOULDER PAIN: ICD-10-CM

## 2022-07-14 DIAGNOSIS — G89.29 CHRONIC BILATERAL LOW BACK PAIN WITHOUT SCIATICA: ICD-10-CM

## 2022-07-14 DIAGNOSIS — E11.9 TYPE 2 DIABETES MELLITUS WITHOUT COMPLICATION, WITH LONG-TERM CURRENT USE OF INSULIN: ICD-10-CM

## 2022-07-14 DIAGNOSIS — M54.50 CHRONIC BILATERAL LOW BACK PAIN WITHOUT SCIATICA: ICD-10-CM

## 2022-07-14 DIAGNOSIS — Z00.00 ROUTINE GENERAL MEDICAL EXAMINATION AT A HEALTH CARE FACILITY: ICD-10-CM

## 2022-07-14 DIAGNOSIS — J32.0 MAXILLARY SINUSITIS, UNSPECIFIED CHRONICITY: Primary | ICD-10-CM

## 2022-07-14 DIAGNOSIS — M17.0 ARTHRITIS OF BOTH KNEES: ICD-10-CM

## 2022-07-14 LAB
EXPIRATION DATE: NORMAL
HBA1C MFR BLD: 5.6 %
Lab: NORMAL

## 2022-07-14 PROCEDURE — 99214 OFFICE O/P EST MOD 30 MIN: CPT | Performed by: INTERNAL MEDICINE

## 2022-07-14 PROCEDURE — 99397 PER PM REEVAL EST PAT 65+ YR: CPT | Performed by: INTERNAL MEDICINE

## 2022-07-14 PROCEDURE — 1159F MED LIST DOCD IN RCRD: CPT | Performed by: INTERNAL MEDICINE

## 2022-07-14 PROCEDURE — 83036 HEMOGLOBIN GLYCOSYLATED A1C: CPT | Performed by: INTERNAL MEDICINE

## 2022-07-14 PROCEDURE — G0439 PPPS, SUBSEQ VISIT: HCPCS | Performed by: INTERNAL MEDICINE

## 2022-07-14 PROCEDURE — 1170F FXNL STATUS ASSESSED: CPT | Performed by: INTERNAL MEDICINE

## 2022-07-14 RX ORDER — ACETAMINOPHEN AND CODEINE PHOSPHATE 300; 30 MG/1; MG/1
1 TABLET ORAL EVERY 4 HOURS PRN
Qty: 10 TABLET | Refills: 0 | Status: SHIPPED | OUTPATIENT
Start: 2022-07-14 | End: 2022-12-14 | Stop reason: SDUPTHER

## 2022-07-14 RX ORDER — SODIUM PHOSPHATE,MONO-DIBASIC 19G-7G/118
1 ENEMA (ML) RECTAL 2 TIMES DAILY WITH MEALS
COMMUNITY

## 2022-07-14 RX ORDER — MONTELUKAST SODIUM 10 MG/1
10 TABLET ORAL
Qty: 30 TABLET | Refills: 11 | Status: SHIPPED | OUTPATIENT
Start: 2022-07-14

## 2022-07-14 RX ORDER — OMEPRAZOLE 40 MG/1
40 CAPSULE, DELAYED RELEASE ORAL DAILY
Qty: 180 CAPSULE | Refills: 3 | Status: SHIPPED | OUTPATIENT
Start: 2022-07-14 | End: 2022-08-03 | Stop reason: SDUPTHER

## 2022-07-14 RX ORDER — DOXYCYCLINE HYCLATE 100 MG/1
100 CAPSULE ORAL 2 TIMES DAILY
Qty: 20 CAPSULE | Refills: 0 | Status: SHIPPED | OUTPATIENT
Start: 2022-07-14 | End: 2022-08-10

## 2022-07-14 RX ORDER — TIZANIDINE 4 MG/1
4 TABLET ORAL NIGHTLY
Qty: 90 TABLET | Refills: 1 | Status: SHIPPED | OUTPATIENT
Start: 2022-07-14 | End: 2023-01-16 | Stop reason: SDUPTHER

## 2022-07-14 RX ORDER — LANOLIN ALCOHOL/MO/W.PET/CERES
325 CREAM (GRAM) TOPICAL
COMMUNITY

## 2022-07-14 NOTE — PROGRESS NOTES
Subjective     Patient ID: Raquel Mariee is a 66 y.o. female. Patient is here for management of multiple medical problems.     Chief Complaint   Patient presents with   • Sinusitis     History of Present Illness     Sinusitis.  Runny nose S/T   Cough.   X 1 weeks. Dark nasty sputum.        The following portions of the patient's history were reviewed and updated as appropriate: allergies, current medications, past family history, past medical history, past social history, past surgical history and problem list.    Review of Systems    Current Outpatient Medications:   •  acetaminophen-codeine (TYLENOL #3) 300-30 MG per tablet, Take 1 tablet by mouth Every 4 (Four) Hours As Needed for Moderate Pain ., Disp: 10 tablet, Rfl: 0  •  albuterol sulfate  (90 Base) MCG/ACT inhaler, Inhale 2 puffs by mouth every 4-6 hours as needed for cough, wheeze, shortness of breath. Use with vortex spacer, Disp: 8.5 g, Rfl: 3  •  allopurinol (Zyloprim) 100 MG tablet, Take 1 tablet by mouth Daily., Disp: 90 tablet, Rfl: 3  •  Azelastine HCl 137 MCG/SPRAY solution, Instill 1-2 sprays in each nostril twice a day as needed, Disp: 30 mL, Rfl: 11  •  betamethasone valerate (VALISONE) 0.1 % cream, Apply topically to the appropriate area as directed Daily., Disp: 45 g, Rfl: 11  •  buPROPion SR (WELLBUTRIN SR) 150 MG 12 hr tablet, Take 1 tablet by mouth 2 (Two) Times a Day., Disp: 180 tablet, Rfl: 3  •  calcium carbonate (Antacid Maximum) 1000 MG chewable tablet, Chew 500 mg 3 (Three) Times a Day., Disp: , Rfl:   •  cholecalciferol (VITAMIN D3) 25 MCG (1000 UT) tablet, Take 1,000 Units by mouth Daily., Disp: , Rfl:   •  clonazePAM (KlonoPIN) 0.5 MG tablet, Take 1 tablet by mouth At Night As Needed, Disp: 30 tablet, Rfl: 2  •  dicyclomine (BENTYL) 10 MG capsule, Take 1 capsule by mouth 3 (Three) Times a Day., Disp: 270 capsule, Rfl: 3  •  diphenhydrAMINE (Benadryl Allergy) 25 MG tablet, Take 1-2 tablets by mouth Every 4-6 Hours As  Needed., Disp: 90 tablet, Rfl: 11  •  famotidine (PEPCID) 20 MG tablet, Take 20 mg by mouth 2 (Two) Times a Day., Disp: , Rfl:   •  ferrous sulfate 325 (65 FE) MG EC tablet, Take 325 mg by mouth., Disp: , Rfl:   •  fexofenadine (ALLEGRA) 180 MG tablet, Take 180 mg by mouth Daily., Disp: , Rfl:   •  fluticasone (FLONASE) 50 MCG/ACT nasal spray, Instill 2 sprays into each nostril daily., Disp: 16 g, Rfl: 11  •  fluticasone-salmeterol (Advair HFA) 230-21 MCG/ACT inhaler, Inhale 2 puffs by mouth 2 (Two) Times a Day., Disp: 12 g, Rfl: 11  •  furosemide (LASIX) 40 MG tablet, Take 1 tablet by mouth Daily. Two days a month., Disp: 30 tablet, Rfl: 1  •  glucosamine-chondroitin 500-400 MG capsule capsule, Take 1 capsule by mouth., Disp: , Rfl:   •  ipratropium (ATROVENT) 0.06 % nasal spray, Instill 1-2 sprays each nostril 2-3 times daily as needed, Disp: 15 mL, Rfl: 3  •  ipratropium-albuterol (DUO-NEB) 0.5-2.5 mg/3 ml nebulizer, Inhale contents of 1 vial (3mL) via nebulizer every 4-6 hours as needed for coughing, wheezing, or shortness of breath., Disp: 270 mL, Rfl: 3  •  isosorbide mononitrate (IMDUR) 60 MG 24 hr tablet, Take 1 tablet by mouth Every Morning., Disp: 90 tablet, Rfl: 3  •  levothyroxine (SYNTHROID, LEVOTHROID) 50 MCG tablet, Take 1 tablet by mouth Daily., Disp: 90 tablet, Rfl: 3  •  Magnesium 250 MG tablet, Take 500 mg by mouth 2 (Two) Times a Day., Disp: , Rfl:   •  melatonin 5 MG tablet tablet, Take 5 mg by mouth Every Night., Disp: , Rfl:   •  montelukast (SINGULAIR) 10 MG tablet, Take 1 tablet by mouth every night at bedtime., Disp: 30 tablet, Rfl: 11  •  multivitamin with minerals tablet tablet, Take 1 tablet by mouth Daily., Disp: , Rfl:   •  omeprazole (priLOSEC) 40 MG capsule, Take 1 capsule by mouth Daily., Disp: 180 capsule, Rfl: 3  •  ondansetron (ZOFRAN) 4 MG tablet, Take 1 tablet by mouth Every 8 (Eight) Hours As Needed for Nausea or Vomiting., Disp: 30 tablet, Rfl: 11  •  potassium citrate  "(UROCIT-K) 10 MEQ (1080 MG) CR tablet, Take 1 tablet by mouth Daily., Disp: 90 tablet, Rfl: 3  •  predniSONE (DELTASONE) 10 MG (21) dose pack, Daily taper- 6/5/4/3/2/1, Disp: 21 tablet, Rfl: 0  •  predniSONE (DELTASONE) 20 MG tablet, With Gamma globulin infusion -monthly, Disp: , Rfl:   •  predniSONE (DELTASONE) 20 MG tablet, Take 2 tablets prior to Gammgard infusion, Disp: 6 tablet, Rfl: 3  •  Spacer/Aero-Holding Chambers (AeroChamber Plus Wali-Vu) misc, Use as directed with albuterol inhaler, Disp: 1 each, Rfl: 0  •  sucralfate (Carafate) 1 g tablet, Take 1 tablet by mouth 4 (Four) Times a Day. (Patient taking differently: Take 1 g by mouth 4 (Four) Times a Day As Needed.), Disp: 120 tablet, Rfl: 1  •  tiotropium bromide monohydrate (Spiriva Respimat) 2.5 MCG/ACT aerosol solution inhaler, Inhale 2 puffs by mouth daily, Disp: 4 g, Rfl: 11  •  tiZANidine (ZANAFLEX) 4 MG tablet, Take 1 tablet by mouth Every Night., Disp: 90 tablet, Rfl: 1  •  valsartan (DIOVAN) 160 MG tablet, Take 1 tablet by mouth Daily., Disp: 90 tablet, Rfl: 3  •  vitamin B-12 (CYANOCOBALAMIN) 1000 MCG tablet, Take 1,000 mcg by mouth Daily., Disp: , Rfl:   •  doxycycline (VIBRAMYCIN) 100 MG capsule, Take 1 capsule by mouth 2 (Two) Times a Day., Disp: 20 capsule, Rfl: 0  •  ubrogepant (ubrogepant) 100 MG tablet, Take 1 tablet by mouth 1 (One) Time for 1 dose., Disp: 10 tablet, Rfl: 11    Current Facility-Administered Medications:   •  heparin injection 500 Units, 5 mL, Intravenous, PRN, Sanjeev Rawls MD  •  sodium chloride 0.9 % flush 10 mL, 10 mL, Intravenous, Q12H, Sanjeev Rawls MD  •  sodium chloride 0.9 % flush 10 mL, 10 mL, Intravenous, PRN, Sanjeev Rawls MD  •  sodium chloride 0.9 % flush 20 mL, 20 mL, Intravenous, PRN, [facility], Sanjeev DELAROSA MD    Objective      Blood pressure 118/84, pulse 76, temperature 98.2 °F (36.8 °C), resp. rate 16, height 162.6 cm (64\"), weight 95.7 kg (211 lb), SpO2 97 %, not currently " breastfeeding.    Physical Exam     General Appearance:    Alert, cooperative, no distress, appears stated age   Head:    Normocephalic, without obvious abnormality, atraumatic   Eyes:    PERRL, conjunctiva/corneas clear, EOM's intact   Ears:    Normal TM's and external ear canals, both ears   Nose:   Nares normal, septum midline, mucosa normal, no drainage   or sinus tenderness   Throat:   Lips, mucosa, and tongue normal; teeth and gums normal   Neck:   Supple, symmetrical, trachea midline, no adenopathy;        thyroid:  No enlargement/tenderness/nodules; no carotid    bruit or JVD   Back:     Symmetric, no curvature, ROM normal, no CVA tenderness   Lungs:     Clear to auscultation bilaterally, respirations unlabored   Chest wall:    No tenderness or deformity   Heart:    Regular rate and rhythm, S1 and S2 normal, 3/36 sys aortic murmur,        rub or gallop   Abdomen:     Soft, non-tender, bowel sounds active all four quadrants,     no masses, no organomegaly   Extremities:   Extremities normal, atraumatic, no cyanosis or edema   Pulses:   2+ and symmetric all extremities   Skin:   Skin color, texture, turgor normal, no rashes or lesions   Lymph nodes:   Cervical, supraclavicular, and axillary nodes normal   Neurologic:   CNII-XII intact. Normal strength, sensation and reflexes       throughout      Results for orders placed or performed during the hospital encounter of 06/10/22   IgG    Specimen: Blood   Result Value Ref Range    IgG 972 700 - 1,600 mg/dL   Comprehensive Metabolic Panel    Specimen: Blood   Result Value Ref Range    Glucose 188 (H) 65 - 99 mg/dL    BUN 12 8 - 23 mg/dL    Creatinine 1.16 (H) 0.57 - 1.00 mg/dL    Sodium 134 (L) 136 - 145 mmol/L    Potassium 4.1 3.5 - 5.2 mmol/L    Chloride 100 98 - 107 mmol/L    CO2 21.5 (L) 22.0 - 29.0 mmol/L    Calcium 9.1 8.6 - 10.5 mg/dL    Total Protein 6.6 6.0 - 8.5 g/dL    Albumin 4.10 3.50 - 5.20 g/dL    ALT (SGPT) 17 1 - 33 U/L    AST (SGOT) 25 1 - 32 U/L     Alkaline Phosphatase 71 39 - 117 U/L    Total Bilirubin 0.2 0.0 - 1.2 mg/dL    Globulin 2.5 gm/dL    A/G Ratio 1.6 g/dL    BUN/Creatinine Ratio 10.3 7.0 - 25.0    Anion Gap 12.5 5.0 - 15.0 mmol/L    eGFR 52.1 (L) >60.0 mL/min/1.73   CBC Auto Differential    Specimen: Blood   Result Value Ref Range    WBC 4.88 3.40 - 10.80 10*3/mm3    RBC 3.82 3.77 - 5.28 10*6/mm3    Hemoglobin 12.6 12.0 - 15.9 g/dL    Hematocrit 35.7 34.0 - 46.6 %    MCV 93.5 79.0 - 97.0 fL    MCH 33.0 26.6 - 33.0 pg    MCHC 35.3 31.5 - 35.7 g/dL    RDW 13.4 12.3 - 15.4 %    RDW-SD 46.1 37.0 - 54.0 fl    MPV 8.7 6.0 - 12.0 fL    Platelets 241 140 - 450 10*3/mm3    Neutrophil % 67.5 42.7 - 76.0 %    Lymphocyte % 22.7 19.6 - 45.3 %    Monocyte % 4.9 (L) 5.0 - 12.0 %    Eosinophil % 3.5 0.3 - 6.2 %    Basophil % 1.0 0.0 - 1.5 %    Immature Grans % 0.4 0.0 - 0.5 %    Neutrophils, Absolute 3.29 1.70 - 7.00 10*3/mm3    Lymphocytes, Absolute 1.11 0.70 - 3.10 10*3/mm3    Monocytes, Absolute 0.24 0.10 - 0.90 10*3/mm3    Eosinophils, Absolute 0.17 0.00 - 0.40 10*3/mm3    Basophils, Absolute 0.05 0.00 - 0.20 10*3/mm3    Immature Grans, Absolute 0.02 0.00 - 0.05 10*3/mm3    nRBC 0.0 0.0 - 0.2 /100 WBC         Assessment & Plan     silverio is helping for the migraines. Wants increased dose.     Cri stable.  Not able to use nsaids. Using t#3 prin 10 per the last 6 months.    Clonazapam as needed at bed time.        Diagnoses and all orders for this visit:    1. Maxillary sinusitis, unspecified chronicity (Primary)  -     CBC & Differential  -     Vitamin B12  -     Comprehensive Metabolic Panel  -     TSH  -     T4, Free  -     Lipid Panel    2. Arthritis of both knees  -     CBC & Differential  -     Vitamin B12  -     Comprehensive Metabolic Panel  -     TSH  -     T4, Free  -     Lipid Panel    3. Chronic renal impairment, stage 2 (mild)  -     CBC & Differential  -     Vitamin B12  -     Comprehensive Metabolic Panel  -     TSH  -     T4, Free  -     Lipid  Panel    4. Chronic right shoulder pain  -     acetaminophen-codeine (TYLENOL #3) 300-30 MG per tablet; Take 1 tablet by mouth Every 4 (Four) Hours As Needed for Moderate Pain .  Dispense: 10 tablet; Refill: 0  -     CBC & Differential  -     Vitamin B12  -     Comprehensive Metabolic Panel  -     TSH  -     T4, Free  -     Lipid Panel    5. Chronic bilateral low back pain without sciatica  -     acetaminophen-codeine (TYLENOL #3) 300-30 MG per tablet; Take 1 tablet by mouth Every 4 (Four) Hours As Needed for Moderate Pain .  Dispense: 10 tablet; Refill: 0  -     CBC & Differential  -     Vitamin B12  -     Comprehensive Metabolic Panel  -     TSH  -     T4, Free  -     Lipid Panel    6. Type 2 diabetes mellitus without complication, with long-term current use of insulin (HCC)  -     tiZANidine (ZANAFLEX) 4 MG tablet; Take 1 tablet by mouth Every Night.  Dispense: 90 tablet; Refill: 1  -     omeprazole (priLOSEC) 40 MG capsule; Take 1 capsule by mouth Daily.  Dispense: 180 capsule; Refill: 3  -     montelukast (SINGULAIR) 10 MG tablet; Take 1 tablet by mouth every night at bedtime.  Dispense: 30 tablet; Refill: 11  -     acetaminophen-codeine (TYLENOL #3) 300-30 MG per tablet; Take 1 tablet by mouth Every 4 (Four) Hours As Needed for Moderate Pain .  Dispense: 10 tablet; Refill: 0  -     CBC & Differential  -     Vitamin B12  -     Comprehensive Metabolic Panel  -     TSH  -     T4, Free  -     Lipid Panel  -     POCT glycated hemoglobin, total    Other orders  -     doxycycline (VIBRAMYCIN) 100 MG capsule; Take 1 capsule by mouth 2 (Two) Times a Day.  Dispense: 20 capsule; Refill: 0  -     ubrogepant (ubrogepant) 100 MG tablet; Take 1 tablet by mouth 1 (One) Time for 1 dose.  Dispense: 10 tablet; Refill: 11      No follow-ups on file.          There are no Patient Instructions on file for this visit.     Sanjeev Rawls MD    Assessment & Plan

## 2022-07-14 NOTE — PROGRESS NOTES
QUICK REFERENCE INFORMATION:  The ABCs of the Annual Wellness Visit    Medicare Annual Wellness Visit    Subjective   History of Present Illness    Raquel Mariee is a 66 y.o. female who presents for an Annual Wellness Visit. In addition, we addressed the following health issues:      Chronic pain:  3/10        PMH, PSH, SocHx, FamHx, Allergies, and Medications: Reviewed and updated.     Outpatient Medications Prior to Visit   Medication Sig Dispense Refill   • albuterol sulfate  (90 Base) MCG/ACT inhaler Inhale 2 puffs by mouth every 4-6 hours as needed for cough, wheeze, shortness of breath. Use with vortex spacer 8.5 g 3   • allopurinol (Zyloprim) 100 MG tablet Take 1 tablet by mouth Daily. 90 tablet 3   • Azelastine HCl 137 MCG/SPRAY solution Instill 1-2 sprays in each nostril twice a day as needed 30 mL 11   • betamethasone valerate (VALISONE) 0.1 % cream Apply topically to the appropriate area as directed Daily. 45 g 11   • buPROPion SR (WELLBUTRIN SR) 150 MG 12 hr tablet Take 1 tablet by mouth 2 (Two) Times a Day. 180 tablet 3   • calcium carbonate (Antacid Maximum) 1000 MG chewable tablet Chew 500 mg 3 (Three) Times a Day.     • cholecalciferol (VITAMIN D3) 25 MCG (1000 UT) tablet Take 1,000 Units by mouth Daily.     • clonazePAM (KlonoPIN) 0.5 MG tablet Take 1 tablet by mouth At Night As Needed 30 tablet 2   • dicyclomine (BENTYL) 10 MG capsule Take 1 capsule by mouth 3 (Three) Times a Day. 270 capsule 3   • diphenhydrAMINE (Benadryl Allergy) 25 MG tablet Take 1-2 tablets by mouth Every 4-6 Hours As Needed. 90 tablet 11   • famotidine (PEPCID) 20 MG tablet Take 20 mg by mouth 2 (Two) Times a Day.     • ferrous sulfate 325 (65 FE) MG EC tablet Take 325 mg by mouth.     • fexofenadine (ALLEGRA) 180 MG tablet Take 180 mg by mouth Daily.     • fluticasone (FLONASE) 50 MCG/ACT nasal spray Instill 2 sprays into each nostril daily. 16 g 11   • fluticasone-salmeterol (Advair HFA) 230-21 MCG/ACT inhaler  Inhale 2 puffs by mouth 2 (Two) Times a Day. 12 g 11   • furosemide (LASIX) 40 MG tablet Take 1 tablet by mouth Daily. Two days a month. 30 tablet 1   • glucosamine-chondroitin 500-400 MG capsule capsule Take 1 capsule by mouth.     • ipratropium (ATROVENT) 0.06 % nasal spray Instill 1-2 sprays each nostril 2-3 times daily as needed 15 mL 3   • ipratropium-albuterol (DUO-NEB) 0.5-2.5 mg/3 ml nebulizer Inhale contents of 1 vial (3mL) via nebulizer every 4-6 hours as needed for coughing, wheezing, or shortness of breath. 270 mL 3   • isosorbide mononitrate (IMDUR) 60 MG 24 hr tablet Take 1 tablet by mouth Every Morning. 90 tablet 3   • levothyroxine (SYNTHROID, LEVOTHROID) 50 MCG tablet Take 1 tablet by mouth Daily. 90 tablet 3   • Magnesium 250 MG tablet Take 500 mg by mouth 2 (Two) Times a Day.     • melatonin 5 MG tablet tablet Take 5 mg by mouth Every Night.     • multivitamin with minerals tablet tablet Take 1 tablet by mouth Daily.     • ondansetron (ZOFRAN) 4 MG tablet Take 1 tablet by mouth Every 8 (Eight) Hours As Needed for Nausea or Vomiting. 30 tablet 11   • potassium citrate (UROCIT-K) 10 MEQ (1080 MG) CR tablet Take 1 tablet by mouth Daily. 90 tablet 3   • predniSONE (DELTASONE) 10 MG (21) dose pack Daily taper- 6/5/4/3/2/1 21 tablet 0   • predniSONE (DELTASONE) 20 MG tablet With Gamma globulin infusion -monthly     • predniSONE (DELTASONE) 20 MG tablet Take 2 tablets prior to Gammgard infusion 6 tablet 3   • Spacer/Aero-Holding Chambers (AeroChamber Plus Wali-Vu) misc Use as directed with albuterol inhaler 1 each 0   • sucralfate (Carafate) 1 g tablet Take 1 tablet by mouth 4 (Four) Times a Day. (Patient taking differently: Take 1 g by mouth 4 (Four) Times a Day As Needed.) 120 tablet 1   • tiotropium bromide monohydrate (Spiriva Respimat) 2.5 MCG/ACT aerosol solution inhaler Inhale 2 puffs by mouth daily 4 g 11   • valsartan (DIOVAN) 160 MG tablet Take 1 tablet by mouth Daily. 90 tablet 3   • vitamin  B-12 (CYANOCOBALAMIN) 1000 MCG tablet Take 1,000 mcg by mouth Daily.     • acetaminophen-codeine (TYLENOL #3) 300-30 MG per tablet Take 1 tablet by mouth Every 4 (Four) Hours As Needed for Moderate Pain . 10 tablet 0   • montelukast (SINGULAIR) 10 MG tablet Take 1 tablet by mouth every night at bedtime. 30 tablet 11   • omeprazole (priLOSEC) 40 MG capsule Take 1 capsule by mouth 2 (two) times a day. 180 capsule 3   • Semaglutide (Rybelsus) 14 MG tablet Take 1 tablet by mouth Daily. 30 tablet 11   • sodium-potassium-magnesium sulfates (Suprep Bowel Prep Kit) 17.5-3.13-1.6 GM/177ML solution oral solution Take 1 bottle by mouth Every 12 (Twelve) Hours. 354 mL 0   • tiZANidine (ZANAFLEX) 4 MG tablet Take 1 tablet by mouth Every Night. 90 tablet 1   • ubrogepant (ubrogepant) 50 MG tablet Take 1 tablet by mouth 1 (One) Time As Needed for headache for up to 1 dose per day 10 tablet 11   • doxycycline (VIBRAMYCIN) 100 MG capsule Take 1 capsule by mouth 2 (Two) Times a Day. 20 capsule 0   • ipratropium (ATROVENT) 0.06 % nasal spray Spray 2 sprays into the nostrils every night at bedtime as directed by provider 15 mL 12   • ipratropium-albuterol (DUO-NEB) 0.5-2.5 mg/3 ml nebulizer Inhale 1 vial (3 mL) by nebulization every 4-6 hours as needed for cough, wheezing, and shortness of breath 270 mL 3     Facility-Administered Medications Prior to Visit   Medication Dose Route Frequency Provider Last Rate Last Admin   • heparin injection 500 Units  5 mL Intravenous PRN Sanjeev Rawls MD       • sodium chloride 0.9 % flush 10 mL  10 mL Intravenous Q12H Sanjeev Rawls MD       • sodium chloride 0.9 % flush 10 mL  10 mL Intravenous PRN Sanjeev Rawls MD       • sodium chloride 0.9 % flush 20 mL  20 mL Intravenous PRN Sanjeev Rawls MD           Patient Active Problem List   Diagnosis   • Hypogammaglobulinemia (HCC)   • Gastric bypass status for obesity   • CKD (chronic kidney disease), stage III (HCC)   • KALYN (obstructive  sleep apnea)   • Major depressive disorder in partial remission ([facility])   • Hypothyroidism   • Fibromyalgia syndrome   • Abdominal aortic aneurysm (AAA) without rupture ([facility])   • Nonrheumatic aortic valve stenosis   • Vitamin D deficiency, unspecified    • Anatomical narrow angle borderline glaucoma   • Abnormal finding of blood chemistry, unspecified    • Chronic diastolic heart failure ([facility])   • Prinzmetal's angina ([facility])   • Common variable agammaglobulinemia ([facility])   • Polymyositis ([facility])   • Port-A-Cath in place   • Abnormal CT of the chest   • Acute bronchospasm   • Acute kidney injury ([facility])   • Sinusitis, chronic   • Anemia   • Aortic valve stenosis and insufficiency, rheumatic   • Body mass index (BMI) of 40.0 to 44.9 in adult ([facility])   • Calcium kidney stones   • Choroidal nevus of left eye   • CAD (coronary artery disease)   • Claw toe, acquired   • Combined forms of age-related cataract of both eyes   • Depressive disorder   • Essential hypertension with goal blood pressure less than 140/90   • Severe persistent asthma with exacerbation   • Glaucoma suspect of both eyes   • Inappropriate sinus tachycardia   • Migraine without aura   • Mitral valve regurgitation   • Osteoarthrosis involving lower leg   • Pain in joint involving lower leg   • Posterior vitreous detachment of both eyes   • Primary osteoarthritis of left wrist   • Anxiety disorder   • Status post total knee replacement   • Sleep disturbances   • Senile nuclear sclerosis   • Rotator cuff syndrome of left shoulder   • Pure hypercholesterolemia   • Thoracic aortic aneurysm without rupture ([facility])   • TIA (transient ischemic attack)   • Tracheomalacia   • Trochlear nerve palsy, left   • Type 2 diabetes mellitus without complication ([facility])       Health Habits:  Dental Exam. up to date  Eye Exam. up to date  No exam data present  Exercise: 7 times/week.  Current exercise activities include: housecleaning, walking and yard work    Health Risk Assessment:  The  patient has completed a Health Risk Assessment. This has been reviewed with them and has been scanned into the patient's chart.    Current Medical Providers:  Patient Care Team:  Sanjeev Rawls MD as PCP - General (Internal Medicine)  Dandre Amador MD as Consulting Physician (Gastroenterology)    The [facility] providers who are involved in the care of this patient are listed above. Additional providers and suppliers are listed below:  Dr Armando Garza.      Recent Hospitalizations:  No hospitalization(s) within the last year..    Recent Lab Results:  CMP:  Lab Results   Component Value Date    GLU 99 06/06/2020    BUN 12 06/10/2022    CREATININE 1.16 (H) 06/10/2022    EGFRIFNONA 48 (L) 02/14/2022    EGFRIFAFRI 50 (L) 06/30/2021    BCR 10.3 06/10/2022     (L) 06/10/2022    K 4.1 06/10/2022    CO2 21.5 (L) 06/10/2022    CALCIUM 9.1 06/10/2022    PROTENTOTREF 7.0 03/22/2022    ALBUMIN 4.10 06/10/2022    LABGLOBREF 2.8 03/22/2022    LABIL2 1.5 03/22/2022    BILITOT 0.2 06/10/2022    ALKPHOS 71 06/10/2022    AST 25 06/10/2022    ALT 17 06/10/2022       LIPID PANEL:  Lab Results   Component Value Date    CHLPL 229 (H) 12/01/2020    TRIG 71 02/14/2022    HDL 89 (H) 02/14/2022    VLDL 13 02/14/2022     (H) 02/14/2022       HbA1c:  Lab Results   Component Value Date    HGBA1C 5.70 (H) 04/15/2022       URINE MICROALBUMIN:  No results found for: MICROALBUR, POCMALB    PSA:  No results found for: PSA    Age-appropriate Screening Schedule:  Refer to the list below for future screening recommendations based on patient's age, sex and/or medical conditions. Orders for these recommended tests are listed in the plan section. The patient has been provided with a written plan.    Health Maintenance   Topic Date Due   • URINE MICROALBUMIN  Never done   • DIABETIC FOOT EXAM  Never done   • DIABETIC EYE EXAM  04/14/2022   • INFLUENZA VACCINE  10/01/2022   • HEMOGLOBIN A1C  10/15/2022   • LIPID PANEL   02/14/2023   • MAMMOGRAM  11/09/2023   • DXA SCAN  11/09/2023   • TDAP/TD VACCINES (4 - Td or Tdap) 10/22/2024   • ZOSTER VACCINE  Completed       Depression Screen:   PHQ-2/PHQ-9 Depression Screening 7/14/2022   Retired PHQ-9 Total Score -   Retired Total Score -   Little Interest or Pleasure in Doing Things 0-->not at all   Feeling Down, Depressed or Hopeless 0-->not at all   PHQ-9: Brief Depression Severity Measure Score 0         Functional and Cognitive Screening:  Functional & Cognitive Status 7/14/2022   Do you have difficulty preparing food and eating? No   Do you have difficulty bathing yourself, getting dressed or grooming yourself? No   Do you have difficulty using the toilet? No   Do you have difficulty moving around from place to place? No   Do you have trouble with steps or getting out of a bed or a chair? No   Current Diet Well Balanced Diet   Dental Exam Up to date   Eye Exam Up to date   Exercise (times per week) 7 times per week   Current Exercises Include Walking   Current Exercise Activities Include -   Do you need help using the phone?  No   Are you deaf or do you have serious difficulty hearing?  No   Do you need help with transportation? No   Do you need help shopping? No   Do you need help preparing meals?  -   Do you need help with housework?  No   Do you need help with laundry? No   Do you need help taking your medications? No   Do you need help managing money? No   Do you ever drive or ride in a car without wearing a seat belt? No   Have you felt unusual stress, anger or loneliness in the last month? No   Who do you live with? Spouse   If you need help, do you have trouble finding someone available to you? No   Have you been bothered in the last four weeks by sexual problems? No   Do you have difficulty concentrating, remembering or making decisions? No       Does the patient have evidence of cognitive impairment? No    Advanced Care Planning:  ACP discussion was held with the patient  "during this visit. Patient has an advance directive (not in EMR), copy requested.    Identification of Risk Factors:  Risk factors include: Advance Directive Discussion  Chronic Pain   Fall Risk.    Review of Systems    Compared to one year ago, the patient feels his physical health is the same.  Compared to one year ago, the patient feels his mental health is the same.    Objective     Physical Exam    Vitals:    07/14/22 1041   BP: 118/84   Pulse: 76   Resp: 16   Temp: 98.2 °F (36.8 °C)   SpO2: 97%   Weight: 95.7 kg (211 lb)   Height: 162.6 cm (64\")       Body mass index is 36.22 kg/m².  Discussed the patient's BMI with her. The BMI is in the acceptable range.    Assessment & Plan   Patient Self-Management and Personalized Health Advice  The patient has been provided with information about: diet, exercise and weight management and preventive services including:   · Annual Wellness Visit (AWV).    Visit Diagnoses:    ICD-10-CM ICD-9-CM   1. Maxillary sinusitis, unspecified chronicity  J32.0 473.0   2. Arthritis of both knees  M17.0 716.96   3. Chronic renal impairment, stage 2 (mild)  N18.2 585.2   4. Chronic right shoulder pain  M25.511 719.41    G89.29 338.29   5. Chronic bilateral low back pain without sciatica  M54.50 724.2    G89.29 338.29   6. Type 2 diabetes mellitus without complication, with long-term current use of insulin ([facility])  E11.9 250.00    Z79.4 V58.67       Orders Placed This Encounter   Procedures   • Vitamin B12     Order Specific Question:   Release to patient     Answer:   Immediate   • Comprehensive Metabolic Panel     Order Specific Question:   Release to patient     Answer:   Immediate   • TSH     Order Specific Question:   Release to patient     Answer:   Immediate   • T4, Free     Order Specific Question:   Release to patient     Answer:   Immediate   • Lipid Panel   • POCT glycated hemoglobin, total     Order Specific Question:   Release to patient     Answer:   Immediate   • CBC & " Differential     Order Specific Question:   Manual Differential     Answer:   No       Outpatient Encounter Medications as of 7/14/2022   Medication Sig Dispense Refill   • acetaminophen-codeine (TYLENOL #3) 300-30 MG per tablet Take 1 tablet by mouth Every 4 (Four) Hours As Needed for Moderate Pain . 10 tablet 0   • albuterol sulfate  (90 Base) MCG/ACT inhaler Inhale 2 puffs by mouth every 4-6 hours as needed for cough, wheeze, shortness of breath. Use with vortex spacer 8.5 g 3   • allopurinol (Zyloprim) 100 MG tablet Take 1 tablet by mouth Daily. 90 tablet 3   • Azelastine HCl 137 MCG/SPRAY solution Instill 1-2 sprays in each nostril twice a day as needed 30 mL 11   • betamethasone valerate (VALISONE) 0.1 % cream Apply topically to the appropriate area as directed Daily. 45 g 11   • buPROPion SR (WELLBUTRIN SR) 150 MG 12 hr tablet Take 1 tablet by mouth 2 (Two) Times a Day. 180 tablet 3   • calcium carbonate (Antacid Maximum) 1000 MG chewable tablet Chew 500 mg 3 (Three) Times a Day.     • cholecalciferol (VITAMIN D3) 25 MCG (1000 UT) tablet Take 1,000 Units by mouth Daily.     • clonazePAM (KlonoPIN) 0.5 MG tablet Take 1 tablet by mouth At Night As Needed 30 tablet 2   • dicyclomine (BENTYL) 10 MG capsule Take 1 capsule by mouth 3 (Three) Times a Day. 270 capsule 3   • diphenhydrAMINE (Benadryl Allergy) 25 MG tablet Take 1-2 tablets by mouth Every 4-6 Hours As Needed. 90 tablet 11   • famotidine (PEPCID) 20 MG tablet Take 20 mg by mouth 2 (Two) Times a Day.     • ferrous sulfate 325 (65 FE) MG EC tablet Take 325 mg by mouth.     • fexofenadine (ALLEGRA) 180 MG tablet Take 180 mg by mouth Daily.     • fluticasone (FLONASE) 50 MCG/ACT nasal spray Instill 2 sprays into each nostril daily. 16 g 11   • fluticasone-salmeterol (Advair HFA) 230-21 MCG/ACT inhaler Inhale 2 puffs by mouth 2 (Two) Times a Day. 12 g 11   • furosemide (LASIX) 40 MG tablet Take 1 tablet by mouth Daily. Two days a month. 30 tablet 1   •  glucosamine-chondroitin 500-400 MG capsule capsule Take 1 capsule by mouth.     • ipratropium (ATROVENT) 0.06 % nasal spray Instill 1-2 sprays each nostril 2-3 times daily as needed 15 mL 3   • ipratropium-albuterol (DUO-NEB) 0.5-2.5 mg/3 ml nebulizer Inhale contents of 1 vial (3mL) via nebulizer every 4-6 hours as needed for coughing, wheezing, or shortness of breath. 270 mL 3   • isosorbide mononitrate (IMDUR) 60 MG 24 hr tablet Take 1 tablet by mouth Every Morning. 90 tablet 3   • levothyroxine (SYNTHROID, LEVOTHROID) 50 MCG tablet Take 1 tablet by mouth Daily. 90 tablet 3   • Magnesium 250 MG tablet Take 500 mg by mouth 2 (Two) Times a Day.     • melatonin 5 MG tablet tablet Take 5 mg by mouth Every Night.     • montelukast (SINGULAIR) 10 MG tablet Take 1 tablet by mouth every night at bedtime. 30 tablet 11   • multivitamin with minerals tablet tablet Take 1 tablet by mouth Daily.     • omeprazole (priLOSEC) 40 MG capsule Take 1 capsule by mouth Daily. 180 capsule 3   • ondansetron (ZOFRAN) 4 MG tablet Take 1 tablet by mouth Every 8 (Eight) Hours As Needed for Nausea or Vomiting. 30 tablet 11   • potassium citrate (UROCIT-K) 10 MEQ (1080 MG) CR tablet Take 1 tablet by mouth Daily. 90 tablet 3   • predniSONE (DELTASONE) 10 MG (21) dose pack Daily taper- 6/5/4/3/2/1 21 tablet 0   • predniSONE (DELTASONE) 20 MG tablet With Gamma globulin infusion -monthly     • predniSONE (DELTASONE) 20 MG tablet Take 2 tablets prior to Gammgard infusion 6 tablet 3   • Spacer/Aero-Holding Chambers (AeroChamber Plus Wali-Vu) misc Use as directed with albuterol inhaler 1 each 0   • sucralfate (Carafate) 1 g tablet Take 1 tablet by mouth 4 (Four) Times a Day. (Patient taking differently: Take 1 g by mouth 4 (Four) Times a Day As Needed.) 120 tablet 1   • tiotropium bromide monohydrate (Spiriva Respimat) 2.5 MCG/ACT aerosol solution inhaler Inhale 2 puffs by mouth daily 4 g 11   • tiZANidine (ZANAFLEX) 4 MG tablet Take 1 tablet by mouth  Every Night. 90 tablet 1   • valsartan (DIOVAN) 160 MG tablet Take 1 tablet by mouth Daily. 90 tablet 3   • vitamin B-12 (CYANOCOBALAMIN) 1000 MCG tablet Take 1,000 mcg by mouth Daily.     • [DISCONTINUED] acetaminophen-codeine (TYLENOL #3) 300-30 MG per tablet Take 1 tablet by mouth Every 4 (Four) Hours As Needed for Moderate Pain . 10 tablet 0   • [DISCONTINUED] montelukast (SINGULAIR) 10 MG tablet Take 1 tablet by mouth every night at bedtime. 30 tablet 11   • [DISCONTINUED] omeprazole (priLOSEC) 40 MG capsule Take 1 capsule by mouth 2 (two) times a day. 180 capsule 3   • [DISCONTINUED] Semaglutide (Rybelsus) 14 MG tablet Take 1 tablet by mouth Daily. 30 tablet 11   • [DISCONTINUED] sodium-potassium-magnesium sulfates (Suprep Bowel Prep Kit) 17.5-3.13-1.6 GM/177ML solution oral solution Take 1 bottle by mouth Every 12 (Twelve) Hours. 354 mL 0   • [DISCONTINUED] tiZANidine (ZANAFLEX) 4 MG tablet Take 1 tablet by mouth Every Night. 90 tablet 1   • [DISCONTINUED] ubrogepant (ubrogepant) 50 MG tablet Take 1 tablet by mouth 1 (One) Time As Needed for headache for up to 1 dose per day 10 tablet 11   • doxycycline (VIBRAMYCIN) 100 MG capsule Take 1 capsule by mouth 2 (Two) Times a Day. 20 capsule 0   • ubrogepant (ubrogepant) 100 MG tablet Take 1 tablet by mouth 1 (One) Time for 1 dose. 10 tablet 11   • [DISCONTINUED] doxycycline (VIBRAMYCIN) 100 MG capsule Take 1 capsule by mouth 2 (Two) Times a Day. 20 capsule 0   • [DISCONTINUED] ipratropium (ATROVENT) 0.06 % nasal spray Spray 2 sprays into the nostrils every night at bedtime as directed by provider 15 mL 12   • [DISCONTINUED] ipratropium-albuterol (DUO-NEB) 0.5-2.5 mg/3 ml nebulizer Inhale 1 vial (3 mL) by nebulization every 4-6 hours as needed for cough, wheezing, and shortness of breath 270 mL 3     Facility-Administered Encounter Medications as of 7/14/2022   Medication Dose Route Frequency Provider Last Rate Last Admin   • heparin injection 500 Units  5 mL  Intravenous PRN Sanjeev Rawls MD       • sodium chloride 0.9 % flush 10 mL  10 mL Intravenous Q12H Sanjeev Rawls MD       • sodium chloride 0.9 % flush 10 mL  10 mL Intravenous PRN Sanjeev Rawls MD       • sodium chloride 0.9 % flush 20 mL  20 mL Intravenous PRN Sanjeev Rawls MD           Reviewed use of high risk medication in the elderly: yes  Reviewed for potential of harmful drug interactions in the elderly: yes    Follow Up:  No follow-ups on file.     An After Visit Summary and PPPS with all of these plans were given to the patient.             Diagnoses and all orders for this visit:    1. Maxillary sinusitis, unspecified chronicity (Primary)  -     CBC & Differential  -     Vitamin B12  -     Comprehensive Metabolic Panel  -     TSH  -     T4, Free  -     Lipid Panel    2. Arthritis of both knees  -     CBC & Differential  -     Vitamin B12  -     Comprehensive Metabolic Panel  -     TSH  -     T4, Free  -     Lipid Panel    3. Chronic renal impairment, stage 2 (mild)  -     CBC & Differential  -     Vitamin B12  -     Comprehensive Metabolic Panel  -     TSH  -     T4, Free  -     Lipid Panel    4. Chronic right shoulder pain  -     acetaminophen-codeine (TYLENOL #3) 300-30 MG per tablet; Take 1 tablet by mouth Every 4 (Four) Hours As Needed for Moderate Pain .  Dispense: 10 tablet; Refill: 0  -     CBC & Differential  -     Vitamin B12  -     Comprehensive Metabolic Panel  -     TSH  -     T4, Free  -     Lipid Panel    5. Chronic bilateral low back pain without sciatica  -     acetaminophen-codeine (TYLENOL #3) 300-30 MG per tablet; Take 1 tablet by mouth Every 4 (Four) Hours As Needed for Moderate Pain .  Dispense: 10 tablet; Refill: 0  -     CBC & Differential  -     Vitamin B12  -     Comprehensive Metabolic Panel  -     TSH  -     T4, Free  -     Lipid Panel    6. Type 2 diabetes mellitus without complication, with long-term current use of insulin (HCC)  -     tiZANidine (ZANAFLEX) 4  MG tablet; Take 1 tablet by mouth Every Night.  Dispense: 90 tablet; Refill: 1  -     omeprazole (priLOSEC) 40 MG capsule; Take 1 capsule by mouth Daily.  Dispense: 180 capsule; Refill: 3  -     montelukast (SINGULAIR) 10 MG tablet; Take 1 tablet by mouth every night at bedtime.  Dispense: 30 tablet; Refill: 11  -     acetaminophen-codeine (TYLENOL #3) 300-30 MG per tablet; Take 1 tablet by mouth Every 4 (Four) Hours As Needed for Moderate Pain .  Dispense: 10 tablet; Refill: 0  -     CBC & Differential  -     Vitamin B12  -     Comprehensive Metabolic Panel  -     TSH  -     T4, Free  -     Lipid Panel  -     POCT glycated hemoglobin, total    Other orders  -     doxycycline (VIBRAMYCIN) 100 MG capsule; Take 1 capsule by mouth 2 (Two) Times a Day.  Dispense: 20 capsule; Refill: 0  -     ubrogepant (ubrogepant) 100 MG tablet; Take 1 tablet by mouth 1 (One) Time for 1 dose.  Dispense: 10 tablet; Refill: 11

## 2022-07-20 RX ORDER — METHYLPREDNISOLONE 4 MG/1
TABLET ORAL
Qty: 21 EACH | Refills: 0 | Status: SHIPPED | OUTPATIENT
Start: 2022-07-20 | End: 2022-07-25 | Stop reason: SDUPTHER

## 2022-07-21 ENCOUNTER — TELEPHONE (OUTPATIENT)
Dept: SURGERY | Age: 67
End: 2022-07-21

## 2022-07-22 ENCOUNTER — HOSPITAL ENCOUNTER (EMERGENCY)
Facility: HOSPITAL | Age: 67
Discharge: HOME | End: 2022-07-22
Attending: EMERGENCY MEDICINE | Admitting: EMERGENCY MEDICINE

## 2022-07-22 ENCOUNTER — APPOINTMENT (OUTPATIENT)
Dept: GENERAL RADIOLOGY | Facility: HOSPITAL | Age: 67
End: 2022-07-22

## 2022-07-22 VITALS
HEART RATE: 94 BPM | HEIGHT: 64 IN | BODY MASS INDEX: 36.02 KG/M2 | RESPIRATION RATE: 19 BRPM | TEMPERATURE: 98 F | SYSTOLIC BLOOD PRESSURE: 127 MMHG | DIASTOLIC BLOOD PRESSURE: 73 MMHG | WEIGHT: 211 LBS | OXYGEN SATURATION: 94 %

## 2022-07-22 DIAGNOSIS — U07.1 COVID-19: Primary | ICD-10-CM

## 2022-07-22 DIAGNOSIS — R05.3 POST-COVID-19 SYNDROME MANIFESTING AS CHRONIC COUGH: ICD-10-CM

## 2022-07-22 DIAGNOSIS — U09.9 POST-COVID-19 SYNDROME MANIFESTING AS CHRONIC COUGH: ICD-10-CM

## 2022-07-22 DIAGNOSIS — R05.9 COUGH: ICD-10-CM

## 2022-07-22 LAB
ALBUMIN SERPL-MCNC: 4 G/DL (ref 3.5–5.2)
ALBUMIN/GLOB SERPL: 1.4 G/DL
ALP SERPL-CCNC: 62 U/L (ref 39–117)
ALT SERPL W P-5'-P-CCNC: 17 U/L (ref 1–33)
ANION GAP SERPL CALCULATED.3IONS-SCNC: 13.7 MMOL/L (ref 5–15)
AST SERPL-CCNC: 23 U/L (ref 1–32)
BASOPHILS # BLD AUTO: 0.05 10*3/MM3 (ref 0–0.2)
BASOPHILS NFR BLD AUTO: 0.8 % (ref 0–1.5)
BILIRUB SERPL-MCNC: 0.4 MG/DL (ref 0–1.2)
BUN SERPL-MCNC: 15 MG/DL (ref 8–23)
BUN/CREAT SERPL: 13.4 (ref 7–25)
CALCIUM SPEC-SCNC: 8.9 MG/DL (ref 8.6–10.5)
CHLORIDE SERPL-SCNC: 97 MMOL/L (ref 98–107)
CO2 SERPL-SCNC: 22.3 MMOL/L (ref 22–29)
CREAT SERPL-MCNC: 1.12 MG/DL (ref 0.57–1)
DEPRECATED RDW RBC AUTO: 42 FL (ref 37–54)
EGFRCR SERPLBLD CKD-EPI 2021: 54.3 ML/MIN/1.73
EOSINOPHIL # BLD AUTO: 0.01 10*3/MM3 (ref 0–0.4)
EOSINOPHIL NFR BLD AUTO: 0.2 % (ref 0.3–6.2)
ERYTHROCYTE [DISTWIDTH] IN BLOOD BY AUTOMATED COUNT: 12.5 % (ref 12.3–15.4)
GLOBULIN UR ELPH-MCNC: 2.9 GM/DL
GLUCOSE SERPL-MCNC: 110 MG/DL (ref 65–99)
HCT VFR BLD AUTO: 35.2 % (ref 34–46.6)
HGB BLD-MCNC: 12.4 G/DL (ref 12–15.9)
HOLD SPECIMEN: NORMAL
HOLD SPECIMEN: NORMAL
IMM GRANULOCYTES # BLD AUTO: 0.07 10*3/MM3 (ref 0–0.05)
IMM GRANULOCYTES NFR BLD AUTO: 1.1 % (ref 0–0.5)
LYMPHOCYTES # BLD AUTO: 1.39 10*3/MM3 (ref 0.7–3.1)
LYMPHOCYTES NFR BLD AUTO: 22.3 % (ref 19.6–45.3)
MCH RBC QN AUTO: 32.4 PG (ref 26.6–33)
MCHC RBC AUTO-ENTMCNC: 35.2 G/DL (ref 31.5–35.7)
MCV RBC AUTO: 91.9 FL (ref 79–97)
MONOCYTES # BLD AUTO: 0.54 10*3/MM3 (ref 0.1–0.9)
MONOCYTES NFR BLD AUTO: 8.7 % (ref 5–12)
NEUTROPHILS NFR BLD AUTO: 4.17 10*3/MM3 (ref 1.7–7)
NEUTROPHILS NFR BLD AUTO: 66.9 % (ref 42.7–76)
NRBC BLD AUTO-RTO: 0 /100 WBC (ref 0–0.2)
NT-PROBNP SERPL-MCNC: 463.2 PG/ML (ref 0–900)
PLATELET # BLD AUTO: 233 10*3/MM3 (ref 140–450)
PMV BLD AUTO: 8.9 FL (ref 6–12)
POTASSIUM SERPL-SCNC: 4.2 MMOL/L (ref 3.5–5.2)
PROT SERPL-MCNC: 6.9 G/DL (ref 6–8.5)
RBC # BLD AUTO: 3.83 10*6/MM3 (ref 3.77–5.28)
SODIUM SERPL-SCNC: 133 MMOL/L (ref 136–145)
TROPONIN T SERPL-MCNC: <0.01 NG/ML (ref 0–0.03)
WBC NRBC COR # BLD: 6.23 10*3/MM3 (ref 3.4–10.8)
WHOLE BLOOD HOLD COAG: NORMAL
WHOLE BLOOD HOLD SPECIMEN: NORMAL

## 2022-07-22 PROCEDURE — 93005 ELECTROCARDIOGRAM TRACING: CPT

## 2022-07-22 PROCEDURE — 96374 THER/PROPH/DIAG INJ IV PUSH: CPT

## 2022-07-22 PROCEDURE — 83880 ASSAY OF NATRIURETIC PEPTIDE: CPT | Performed by: PHYSICIAN ASSISTANT

## 2022-07-22 PROCEDURE — 84484 ASSAY OF TROPONIN QUANT: CPT | Performed by: PHYSICIAN ASSISTANT

## 2022-07-22 PROCEDURE — 25010000002 HEPARIN LOCK FLUSH PER 10 UNITS

## 2022-07-22 PROCEDURE — 96361 HYDRATE IV INFUSION ADD-ON: CPT

## 2022-07-22 PROCEDURE — 71045 X-RAY EXAM CHEST 1 VIEW: CPT

## 2022-07-22 PROCEDURE — 36415 COLL VENOUS BLD VENIPUNCTURE: CPT

## 2022-07-22 PROCEDURE — 85025 COMPLETE CBC W/AUTO DIFF WBC: CPT | Performed by: PHYSICIAN ASSISTANT

## 2022-07-22 PROCEDURE — 96360 HYDRATION IV INFUSION INIT: CPT

## 2022-07-22 PROCEDURE — 80053 COMPREHEN METABOLIC PANEL: CPT | Performed by: PHYSICIAN ASSISTANT

## 2022-07-22 PROCEDURE — 99283 EMERGENCY DEPT VISIT LOW MDM: CPT

## 2022-07-22 RX ORDER — HEPARIN SODIUM (PORCINE) LOCK FLUSH IV SOLN 100 UNIT/ML 100 UNIT/ML
SOLUTION INTRAVENOUS
Status: COMPLETED
Start: 2022-07-22 | End: 2022-07-22

## 2022-07-22 RX ORDER — HEPARIN SODIUM (PORCINE) LOCK FLUSH IV SOLN 100 UNIT/ML 100 UNIT/ML
300 SOLUTION INTRAVENOUS ONCE
Status: CANCELLED | OUTPATIENT
Start: 2022-07-22 | End: 2022-07-22

## 2022-07-22 RX ORDER — SODIUM CHLORIDE 0.9 % (FLUSH) 0.9 %
10 SYRINGE (ML) INJECTION AS NEEDED
Status: DISCONTINUED | OUTPATIENT
Start: 2022-07-22 | End: 2022-07-22 | Stop reason: HOSPADM

## 2022-07-22 RX ORDER — HEPARIN SODIUM (PORCINE) LOCK FLUSH IV SOLN 100 UNIT/ML 100 UNIT/ML
300 SOLUTION INTRAVENOUS ONCE
Status: COMPLETED | OUTPATIENT
Start: 2022-07-22 | End: 2022-07-22

## 2022-07-22 RX ADMIN — HEPARIN 300 UNITS: 100 SYRINGE at 12:19

## 2022-07-22 RX ADMIN — HEPARIN SODIUM (PORCINE) LOCK FLUSH IV SOLN 100 UNIT/ML 300 UNITS: 100 SOLUTION at 12:19

## 2022-07-22 RX ADMIN — SODIUM CHLORIDE 1000 ML: 9 INJECTION, SOLUTION INTRAVENOUS at 10:39

## 2022-07-25 RX ORDER — METHYLPREDNISOLONE 4 MG/1
TABLET ORAL
Qty: 21 EACH | Refills: 0 | Status: SHIPPED | OUTPATIENT
Start: 2022-07-25 | End: 2022-08-10

## 2022-07-25 RX ORDER — LEVOFLOXACIN 500 MG/1
500 TABLET, FILM COATED ORAL DAILY
Qty: 10 TABLET | Refills: 0 | Status: SHIPPED | OUTPATIENT
Start: 2022-07-25 | End: 2022-08-10

## 2022-08-02 ENCOUNTER — TELEPHONE (OUTPATIENT)
Dept: INTERNAL MEDICINE | Facility: CLINIC | Age: 67
End: 2022-08-02

## 2022-08-02 DIAGNOSIS — Z79.4 TYPE 2 DIABETES MELLITUS WITHOUT COMPLICATION, WITH LONG-TERM CURRENT USE OF INSULIN: ICD-10-CM

## 2022-08-02 DIAGNOSIS — E11.9 TYPE 2 DIABETES MELLITUS WITHOUT COMPLICATION, WITH LONG-TERM CURRENT USE OF INSULIN: ICD-10-CM

## 2022-08-02 NOTE — TELEPHONE ENCOUNTER
Pharmacy Name:      [facility]    Pharmacy representative name:     [place]    Pharmacy representative phone number:     [phone]    What medication are you calling in regards to:     omeprazole (priLOSEC) 40 MG capsule    PHARMACY REQUESTED VERIFICATION FROM DR STANLEY FOR THE MEDICATION DIRECTIONS    PHARMACY STATED DIRECTIONS STATE TO BE TAKEN DAILY, BUT PHARMACY REQUESTED CONFIRMATION IF DIRECTION SHOULD READ, TO BE TAKEN TWICE DAILY    fluticasone-salmeterol (Advair HFA) 230-21 MCG/ACT inhaler    tiotropium bromide monohydrate (Spiriva Respimat) 2.5 MCG/ACT aerosol solution inhaler    PHARMACY REQUESTED CONFIRMATION FROM DR STANLEY REGARDING CHANGING THE TWO INHALERS LISTED ABOVE WITH A DIFFERENT COMBINATION INHALER    BREZTRI    PHARMACY STATED THE BREZTRI WOULD HELP WITH PATIENT'S COUGH    DR STANLEY

## 2022-08-03 DIAGNOSIS — E11.9 TYPE 2 DIABETES MELLITUS WITHOUT COMPLICATION, WITH LONG-TERM CURRENT USE OF INSULIN: ICD-10-CM

## 2022-08-03 DIAGNOSIS — Z79.4 TYPE 2 DIABETES MELLITUS WITHOUT COMPLICATION, WITH LONG-TERM CURRENT USE OF INSULIN: ICD-10-CM

## 2022-08-03 RX ORDER — OMEPRAZOLE 40 MG/1
40 CAPSULE, DELAYED RELEASE ORAL 2 TIMES DAILY
Qty: 180 CAPSULE | Refills: 3 | Status: SHIPPED | OUTPATIENT
Start: 2022-08-03

## 2022-08-03 RX ORDER — OMEPRAZOLE 40 MG/1
40 CAPSULE, DELAYED RELEASE ORAL 2 TIMES DAILY
Qty: 180 CAPSULE | Refills: 3 | Status: SHIPPED | OUTPATIENT
Start: 2022-08-03 | End: 2022-08-03 | Stop reason: SDUPTHER

## 2022-08-03 RX ORDER — TIOTROPIUM BROMIDE INHALATION SPRAY 3.12 UG/1
2 SPRAY, METERED RESPIRATORY (INHALATION) DAILY
Qty: 4 G | Refills: 11 | Status: SHIPPED | OUTPATIENT
Start: 2022-08-03 | End: 2022-08-09 | Stop reason: ALTCHOICE

## 2022-08-03 RX ORDER — FLUTICASONE PROPIONATE AND SALMETEROL XINAFOATE 230; 21 UG/1; UG/1
AEROSOL, METERED RESPIRATORY (INHALATION)
Qty: 12 G | Refills: 11 | Status: SHIPPED | OUTPATIENT
Start: 2022-08-03 | End: 2022-08-09 | Stop reason: ALTCHOICE

## 2022-08-05 RX ORDER — ONDANSETRON 4 MG/1
4 TABLET, FILM COATED ORAL EVERY 8 HOURS PRN
Qty: 30 TABLET | Refills: 11 | Status: SHIPPED | OUTPATIENT
Start: 2022-08-05

## 2022-08-05 NOTE — TELEPHONE ENCOUNTER
Rx Refill Note  Requested Prescriptions     Pending Prescriptions Disp Refills   • ondansetron (ZOFRAN) 4 MG tablet [Pharmacy Med Name: Ondansetron HCl 4 MG Oral Tablet (ZOFRAN)] 30 tablet 11     Sig: Take 1 tablet by mouth Every 8 (Eight) Hours As Needed for Nausea or Vomiting.      Last office visit with prescribing clinician: 7/14/2022      Next office visit with prescribing clinician: 3/16/2023            Giovani Joya MA  08/05/22, 09:32 EDT

## 2022-08-10 ENCOUNTER — HOSPITAL ENCOUNTER (OUTPATIENT)
Dept: INFUSION THERAPY | Facility: HOSPITAL | Age: 67
Setting detail: INFUSION SERIES
Discharge: HOME | End: 2022-08-10

## 2022-08-10 VITALS
DIASTOLIC BLOOD PRESSURE: 73 MMHG | SYSTOLIC BLOOD PRESSURE: 143 MMHG | OXYGEN SATURATION: 96 % | WEIGHT: 212 LBS | RESPIRATION RATE: 14 BRPM | BODY MASS INDEX: 36.39 KG/M2 | TEMPERATURE: 98.4 F | HEART RATE: 77 BPM

## 2022-08-10 DIAGNOSIS — D80.1 COMMON VARIABLE AGAMMAGLOBULINEMIA: Primary | ICD-10-CM

## 2022-08-10 DIAGNOSIS — Z95.828 PORT-A-CATH IN PLACE: ICD-10-CM

## 2022-08-10 PROCEDURE — 25010000002 HEPARIN LOCK FLUSH PER 10 UNITS: Performed by: ALLERGY & IMMUNOLOGY

## 2022-08-10 PROCEDURE — 25010000002 IMMUNE GLOBULIN (HUMAN) 20 GM/200ML SOLUTION: Performed by: ALLERGY & IMMUNOLOGY

## 2022-08-10 PROCEDURE — 96366 THER/PROPH/DIAG IV INF ADDON: CPT

## 2022-08-10 PROCEDURE — 96361 HYDRATE IV INFUSION ADD-ON: CPT

## 2022-08-10 PROCEDURE — 96365 THER/PROPH/DIAG IV INF INIT: CPT

## 2022-08-10 RX ORDER — SODIUM CHLORIDE 0.9 % (FLUSH) 0.9 %
10 SYRINGE (ML) INJECTION AS NEEDED
Status: DISCONTINUED | OUTPATIENT
Start: 2022-08-10 | End: 2022-08-12 | Stop reason: HOSPADM

## 2022-08-10 RX ORDER — SODIUM CHLORIDE 0.9 % (FLUSH) 0.9 %
10 SYRINGE (ML) INJECTION AS NEEDED
Status: CANCELLED | OUTPATIENT
Start: 2022-08-10

## 2022-08-10 RX ORDER — PREDNISONE 20 MG/1
40 TABLET ORAL ONCE
Status: CANCELLED
Start: 2022-09-07 | End: 2022-09-07

## 2022-08-10 RX ORDER — DIPHENHYDRAMINE HCL 25 MG
50 CAPSULE ORAL ONCE AS NEEDED
Status: CANCELLED
Start: 2022-09-07

## 2022-08-10 RX ORDER — PREDNISONE 20 MG/1
40 TABLET ORAL ONCE AS NEEDED
Status: CANCELLED
Start: 2022-09-07

## 2022-08-10 RX ORDER — HEPARIN SODIUM (PORCINE) LOCK FLUSH IV SOLN 100 UNIT/ML 100 UNIT/ML
500 SOLUTION INTRAVENOUS AS NEEDED
Status: DISCONTINUED | OUTPATIENT
Start: 2022-08-10 | End: 2022-08-12 | Stop reason: HOSPADM

## 2022-08-10 RX ORDER — HEPARIN SODIUM (PORCINE) LOCK FLUSH IV SOLN 100 UNIT/ML 100 UNIT/ML
500 SOLUTION INTRAVENOUS AS NEEDED
Status: CANCELLED | OUTPATIENT
Start: 2022-08-10

## 2022-08-10 RX ORDER — ACETAMINOPHEN 325 MG/1
325 TABLET ORAL ONCE
Status: CANCELLED | OUTPATIENT
Start: 2022-09-07

## 2022-08-10 RX ORDER — METHYLPREDNISOLONE SODIUM SUCCINATE 125 MG/2ML
50 INJECTION, POWDER, LYOPHILIZED, FOR SOLUTION INTRAMUSCULAR; INTRAVENOUS ONCE AS NEEDED
Status: CANCELLED
Start: 2022-09-07

## 2022-08-10 RX ORDER — EPINEPHRINE 1 MG/ML
0.3 INJECTION, SOLUTION INTRAMUSCULAR; SUBCUTANEOUS ONCE AS NEEDED
Status: CANCELLED
Start: 2022-09-07

## 2022-08-10 RX ORDER — DIPHENHYDRAMINE HCL 25 MG
50 CAPSULE ORAL ONCE
Status: CANCELLED
Start: 2022-09-07 | End: 2022-09-07

## 2022-08-10 RX ADMIN — Medication 10 ML: at 12:36

## 2022-08-10 RX ADMIN — HEPARIN 500 UNITS: 100 SYRINGE at 12:36

## 2022-08-10 RX ADMIN — IMMUNE GLOBULIN INFUSION (HUMAN) 20 G: 100 INJECTION, SOLUTION INTRAVENOUS; SUBCUTANEOUS at 09:17

## 2022-08-10 RX ADMIN — SODIUM CHLORIDE 500 ML: 9 INJECTION, SOLUTION INTRAVENOUS at 08:31

## 2022-08-10 RX ADMIN — IMMUNE GLOBULIN INFUSION (HUMAN) 20 G: 100 INJECTION, SOLUTION INTRAVENOUS; SUBCUTANEOUS at 11:07

## 2022-08-17 ENCOUNTER — OFFICE VISIT (OUTPATIENT)
Dept: CARDIOLOGY | Facility: CLINIC | Age: 67
End: 2022-08-17

## 2022-08-17 VITALS
WEIGHT: 218 LBS | DIASTOLIC BLOOD PRESSURE: 86 MMHG | BODY MASS INDEX: 37.22 KG/M2 | HEART RATE: 85 BPM | HEIGHT: 64 IN | SYSTOLIC BLOOD PRESSURE: 120 MMHG | OXYGEN SATURATION: 94 %

## 2022-08-17 DIAGNOSIS — I35.0 AORTIC STENOSIS, MILD: Primary | ICD-10-CM

## 2022-08-17 DIAGNOSIS — N18.30 STAGE 3 CHRONIC KIDNEY DISEASE, UNSPECIFIED WHETHER STAGE 3A OR 3B CKD: ICD-10-CM

## 2022-08-17 DIAGNOSIS — I50.32 CHRONIC DIASTOLIC HEART FAILURE: ICD-10-CM

## 2022-08-17 DIAGNOSIS — I20.1 PRINZMETAL'S ANGINA: ICD-10-CM

## 2022-08-17 DIAGNOSIS — I35.0 NONRHEUMATIC AORTIC VALVE STENOSIS: ICD-10-CM

## 2022-08-17 PROCEDURE — 99214 OFFICE O/P EST MOD 30 MIN: CPT | Performed by: INTERNAL MEDICINE

## 2022-08-17 RX ORDER — ISOSORBIDE MONONITRATE 30 MG/1
30 TABLET, EXTENDED RELEASE ORAL EVERY MORNING
Qty: 90 TABLET | Refills: 3 | Status: SHIPPED | OUTPATIENT
Start: 2022-08-17

## 2022-08-17 NOTE — PROGRESS NOTES
[facility] Cardiology Office Follow Up Note    Raquel Mariee  [id number]  2022    Primary Care Provider: Sanjeev Rawls MD    Chief Complaint: Routine follow-up    History of Present Illness:   Mrs. Raquel Mariee is a 66 y.o. female who presents to the Cardiology Clinic for routine follow-up.   The patient has a past medical history significant for common variable immunodeficiency on chronic IVIG infusions, stage III chronic kidney disease, hypothyroidism, anemia, type 2 diabetes mellitus, and hypertension.  She has a past cardiac history significant for chronic diastolic heart failure, Prinzmetal's angina maintained on isosorbide, and mild aortic stenosis based on echocardiogram in 2020.  She returns to the cardiology clinic today for routine follow-up.  Since her last appointment, she reports he has generally been well without significant changes in her health.  She continues to deny significant exertional dyspnea.  No exertional chest pain or chest discomfort.  No significant palpitations.  No orthopnea, PND, or significant lower extremity swelling.  No other specific complaints today.    Past Cardiac Testin. Last Coronary Angio: None  2. Prior Stress Testing: Unknown  3. Last Echo: 2022   1.  Normal left ventricular size and systolic function, LVEF 65-70%.   2.  Mild concentric LVH.   3.  Indeterminate LV diastolic filling pattern.   4.  Normal right ventricular size and systolic function.   5.  Mildly increased left atrial volume index.   6.  Mild to moderate aortic stenosis (V-max 307 cm/s, mean gradient 17 mmHg, max 38 mmHg, DOMENIC 1.86 cm²).   7.  Trace MR and TR.  4. Prior Holter Monitor: None    Review of Systems:   Review of Systems   Constitutional: Negative for activity change, appetite change, chills, diaphoresis, fatigue, fever, unexpected weight gain and unexpected weight loss.   Eyes: Negative for blurred vision and double vision.   Respiratory:  Negative for cough, chest tightness, shortness of breath and wheezing.    Cardiovascular: Negative for chest pain, palpitations and leg swelling.   Gastrointestinal: Negative for abdominal pain, anal bleeding, blood in stool and GERD.   Endocrine: Negative for cold intolerance and heat intolerance.   Genitourinary: Negative for hematuria.   Neurological: Negative for dizziness, syncope, weakness and light-headedness.   Hematological: Does not bruise/bleed easily.   Psychiatric/Behavioral: Negative for depressed mood and stress. The patient is not nervous/anxious.        I have reviewed and/or updated the patient's past medical, past surgical, family, social history, problem list and allergies as appropriate.     Medications:     Current Outpatient Medications:   •  acetaminophen-codeine (TYLENOL #3) 300-30 MG per tablet, Take 1 tablet by mouth Every 4 (Four) Hours As Needed for Moderate Pain ., Disp: 10 tablet, Rfl: 0  •  albuterol sulfate  (90 Base) MCG/ACT inhaler, Inhale 2 puffs by mouth every 4-6 hours as needed for cough, wheeze, shortness of breath. Use with vortex spacer, Disp: 8.5 g, Rfl: 3  •  allopurinol (Zyloprim) 100 MG tablet, Take 1 tablet by mouth Daily., Disp: 90 tablet, Rfl: 3  •  betamethasone valerate (VALISONE) 0.1 % cream, Apply topically to the appropriate area as directed Daily., Disp: 45 g, Rfl: 11  •  Budeson-Glycopyrrol-Formoterol (Breztri Aerosphere) 160-9-4.8 MCG/ACT aerosol inhaler, Inhale 2 puffs by mouth twice daily. Rinse mouth after use, Disp: 10.7 g, Rfl: 11  •  buPROPion SR (WELLBUTRIN SR) 150 MG 12 hr tablet, Take 1 tablet by mouth 2 (Two) Times a Day., Disp: 180 tablet, Rfl: 3  •  calcium carbonate (Antacid Maximum) 1000 MG chewable tablet, Chew 500 mg 3 (Three) Times a Day., Disp: , Rfl:   •  cholecalciferol (VITAMIN D3) 25 MCG (1000 UT) tablet, Take 1,000 Units by mouth Daily., Disp: , Rfl:   •  clonazePAM (KlonoPIN) 0.5 MG tablet, Take 1 tablet by mouth At Night As  Needed, Disp: 30 tablet, Rfl: 2  •  dicyclomine (BENTYL) 10 MG capsule, Take 1 capsule by mouth 3 (Three) Times a Day., Disp: 270 capsule, Rfl: 3  •  diphenhydrAMINE (Benadryl Allergy) 25 MG tablet, Take 1-2 tablets by mouth Every 4-6 Hours As Needed., Disp: 90 tablet, Rfl: 11  •  ferrous sulfate 325 (65 FE) MG EC tablet, Take 325 mg by mouth., Disp: , Rfl:   •  fexofenadine (ALLEGRA) 180 MG tablet, Take 180 mg by mouth Daily., Disp: , Rfl:   •  fluticasone (FLONASE) 50 MCG/ACT nasal spray, Instill 2 sprays into each nostril daily. (Patient taking differently: 2 sprays by Each Nare route 2 (Two) Times a Day.), Disp: 16 g, Rfl: 11  •  furosemide (LASIX) 40 MG tablet, Take 1 tablet by mouth Daily. Two days a month., Disp: 30 tablet, Rfl: 1  •  HYDROcod Polst-CPM Polst ER (Tussionex Pennkinetic ER) 10-8 MG/5ML ER suspension, Take 1 teaspoonful (5mL) by mouth Every 12 (Twelve) Hours As Needed for Cough., Disp: 60 mL, Rfl: 0  •  ipratropium (ATROVENT) 0.06 % nasal spray, Instill 1-2 sprays each nostril 2-3 times daily as needed, Disp: 15 mL, Rfl: 3  •  ipratropium-albuterol (DUO-NEB) 0.5-2.5 mg/3 ml nebulizer, Inhale contents of 1 vial (3mL) via nebulizer every 4-6 hours as needed for coughing, wheezing, or shortness of breath., Disp: 270 mL, Rfl: 3  •  isosorbide mononitrate (IMDUR) 30 MG 24 hr tablet, Take 1 tablet by mouth Every Morning., Disp: 90 tablet, Rfl: 3  •  levothyroxine (SYNTHROID, LEVOTHROID) 50 MCG tablet, Take 1 tablet by mouth Daily., Disp: 90 tablet, Rfl: 3  •  Magnesium 250 MG tablet, Take 500 mg by mouth 2 (Two) Times a Day., Disp: , Rfl:   •  montelukast (SINGULAIR) 10 MG tablet, Take 1 tablet by mouth every night at bedtime., Disp: 30 tablet, Rfl: 11  •  multivitamin with minerals tablet tablet, Take 1 tablet by mouth Daily., Disp: , Rfl:   •  omeprazole (priLOSEC) 40 MG capsule, Take 1 capsule by mouth 2 (Two) Times a Day., Disp: 180 capsule, Rfl: 3  •  ondansetron (ZOFRAN) 4 MG tablet, Take 1  tablet by mouth Every 8 (Eight) Hours As Needed for Nausea or Vomiting., Disp: 30 tablet, Rfl: 11  •  POTASSIUM CHLORIDE PO, Take 20 mEq by mouth See Admin Instructions. Takes two times a month with Lasix, Disp: , Rfl:   •  potassium citrate (UROCIT-K) 10 MEQ (1080 MG) CR tablet, Take 1 tablet by mouth Daily., Disp: 90 tablet, Rfl: 3  •  predniSONE (DELTASONE) 20 MG tablet, Take 2 tablets by mouth prior to Gammgard infusion, Disp: 6 tablet, Rfl: 3  •  Spacer/Aero-Holding Chambers (AeroChamber Plus Wali-Vu) misc, Use as directed with albuterol inhaler, Disp: 1 each, Rfl: 0  •  tiZANidine (ZANAFLEX) 4 MG tablet, Take 1 tablet by mouth Every Night., Disp: 90 tablet, Rfl: 1  •  ubrogepant (ubrogepant) 100 MG tablet, Take  by mouth., Disp: , Rfl:   •  valsartan (DIOVAN) 160 MG tablet, Take 1 tablet by mouth Daily., Disp: 90 tablet, Rfl: 3  •  vitamin B-12 (CYANOCOBALAMIN) 1000 MCG tablet, Take 1,000 mcg by mouth Daily., Disp: , Rfl:   •  albuterol sulfate  (90 Base) MCG/ACT inhaler, Inhale 2 puffs by mouth every 4 to 6 hours as needed for cough, wheeze, shortness of breath. Use with vortex spacer, Disp: 8.5 g, Rfl: 3  •  azithromycin (Zithromax Z-Russ) 250 MG tablet, Take 2 tablets by mouth on day 1, then take 1 tablet daily on days 2-5, Disp: 6 tablet, Rfl: 0  •  glucosamine-chondroitin 500-400 MG capsule capsule, Take 1 capsule by mouth., Disp: , Rfl:   •  melatonin 5 MG tablet tablet, Take 10 mg by mouth Every Night., Disp: , Rfl:   •  montelukast (SINGULAIR) 10 MG tablet, Take 1 tablet by mouth every night at bedtime., Disp: 30 tablet, Rfl: 11  •  predniSONE (DELTASONE) 20 MG tablet, Take 2 tablets prior to Gammgard infusion, Disp: 6 tablet, Rfl: 3    Current Facility-Administered Medications:   •  heparin injection 500 Units, 5 mL, Intravenous, PRN, Sanjeev Rawls MD  •  sodium chloride 0.9 % flush 10 mL, 10 mL, Intravenous, Q12H, Sanjeev Rawls MD  •  sodium chloride 0.9 % flush 10 mL, 10 mL,  "Intravenous, Sinai RIVAS Michael A, MD  •  sodium chloride 0.9 % flush 20 mL, 20 mL, ROB Best Geile, Michael A, MD    Physical Exam:  Vital Signs:   Vitals:    08/17/22 0950   BP: 120/86   BP Location: Right arm   Patient Position: Sitting   Cuff Size: Adult   Pulse: 85   SpO2: 94%   Weight: 98.9 kg (218 lb)   Height: 162.6 cm (64\")       Physical Exam  Constitutional:       General: She is not in acute distress.     Appearance: She is well-developed. She is obese. She is not diaphoretic.   HENT:      Head: Normocephalic and atraumatic.   Eyes:      General: No scleral icterus.     Pupils: Pupils are equal, round, and reactive to light.   Neck:      Trachea: No tracheal deviation.   Cardiovascular:      Rate and Rhythm: Normal rate and regular rhythm.      Heart sounds: Normal heart sounds.     No friction rub. No gallop.      Comments: Normal JVD.  4/6 systolic ejection murmur over the aortic area  Pulmonary:      Effort: Pulmonary effort is normal. No respiratory distress.      Breath sounds: Normal breath sounds. No stridor. No wheezing or rales.   Chest:      Chest wall: No tenderness.   Abdominal:      General: Bowel sounds are normal. There is no distension.      Palpations: Abdomen is soft.      Tenderness: There is no abdominal tenderness. There is no guarding or rebound.   Musculoskeletal:         General: No swelling. Normal range of motion.      Cervical back: Neck supple. No tenderness.   Lymphadenopathy:      Cervical: No cervical adenopathy.   Skin:     General: Skin is warm and dry.      Findings: No erythema.   Neurological:      General: No focal deficit present.      Mental Status: She is alert and oriented to person, place, and time.   Psychiatric:         Mood and Affect: Mood normal.         Behavior: Behavior normal.         Results Review:   I reviewed the patient's new clinical results.      Assessment / Plan:       1. Aortic stenosis  --Repeat echocardiogram shows mild to moderate " calcific aortic stenosis  --Asymptomatic  --Repeat echocardiogram in 1 year for surveillance     2. Chronic diastolic heart failure  --Remains euvolemic on exam today  --No current diuretic requirement     3.  Prinzmetal angina   --No recent anginal symptoms  --Continue isosorbide, uptitrate as required     4. Common variable immunodeficiency   --Managed by immunology     5.  Chronic kidney disease, stage III  --Last labs in 7/22 show renal function stable with creatinine 1.12     6.  Obesity, BMI 37 kg/m²  --Encouraged continued efforts at weight loss through diet and exercise    Follow Up:   No follow-ups on file.      Thank you for allowing me to participate in the care of your patient. Please to not hesitate to contact me with additional questions or concerns.     SENTHIL Lovelace MD  Interventional Cardiology   08/17/2022  09:52 EDT

## 2022-08-24 RX ORDER — DOXYCYCLINE HYCLATE 100 MG/1
100 CAPSULE ORAL 2 TIMES DAILY
Qty: 20 CAPSULE | Refills: 0 | Status: SHIPPED | OUTPATIENT
Start: 2022-08-24 | End: 2022-10-21

## 2022-09-07 ENCOUNTER — HOSPITAL ENCOUNTER (OUTPATIENT)
Dept: INFUSION THERAPY | Facility: HOSPITAL | Age: 67
Setting detail: INFUSION SERIES
Discharge: HOME | End: 2022-09-07

## 2022-09-07 VITALS
RESPIRATION RATE: 18 BRPM | BODY MASS INDEX: 36.9 KG/M2 | OXYGEN SATURATION: 98 % | WEIGHT: 215 LBS | TEMPERATURE: 97.7 F | SYSTOLIC BLOOD PRESSURE: 169 MMHG | DIASTOLIC BLOOD PRESSURE: 70 MMHG | HEART RATE: 82 BPM

## 2022-09-07 DIAGNOSIS — Z95.828 PORT-A-CATH IN PLACE: ICD-10-CM

## 2022-09-07 DIAGNOSIS — D80.1 COMMON VARIABLE AGAMMAGLOBULINEMIA: Primary | ICD-10-CM

## 2022-09-07 PROCEDURE — 96366 THER/PROPH/DIAG IV INF ADDON: CPT

## 2022-09-07 PROCEDURE — 25010000002 HEPARIN LOCK FLUSH PER 10 UNITS: Performed by: ALLERGY & IMMUNOLOGY

## 2022-09-07 PROCEDURE — 25010000002 IMMUNE GLOBULIN (HUMAN) 20 GM/200ML SOLUTION: Performed by: ALLERGY & IMMUNOLOGY

## 2022-09-07 PROCEDURE — 96415 CHEMO IV INFUSION ADDL HR: CPT

## 2022-09-07 PROCEDURE — 96413 CHEMO IV INFUSION 1 HR: CPT

## 2022-09-07 PROCEDURE — 96361 HYDRATE IV INFUSION ADD-ON: CPT

## 2022-09-07 PROCEDURE — 96365 THER/PROPH/DIAG IV INF INIT: CPT

## 2022-09-07 RX ORDER — PREDNISONE 20 MG/1
40 TABLET ORAL ONCE AS NEEDED
Status: CANCELLED
Start: 2022-10-05

## 2022-09-07 RX ORDER — HEPARIN SODIUM (PORCINE) LOCK FLUSH IV SOLN 100 UNIT/ML 100 UNIT/ML
500 SOLUTION INTRAVENOUS AS NEEDED
Status: DISCONTINUED | OUTPATIENT
Start: 2022-09-07 | End: 2022-09-09 | Stop reason: HOSPADM

## 2022-09-07 RX ORDER — ACETAMINOPHEN 325 MG/1
325 TABLET ORAL ONCE
Status: DISCONTINUED | OUTPATIENT
Start: 2022-09-07 | End: 2022-09-09 | Stop reason: HOSPADM

## 2022-09-07 RX ORDER — ACETAMINOPHEN 325 MG/1
325 TABLET ORAL ONCE
Status: CANCELLED | OUTPATIENT
Start: 2022-10-05

## 2022-09-07 RX ORDER — DIPHENHYDRAMINE HCL 25 MG
50 CAPSULE ORAL ONCE
Status: CANCELLED
Start: 2022-10-05 | End: 2022-10-05

## 2022-09-07 RX ORDER — DIPHENHYDRAMINE HCL 25 MG
50 CAPSULE ORAL ONCE AS NEEDED
Status: CANCELLED
Start: 2022-10-05

## 2022-09-07 RX ORDER — DIPHENHYDRAMINE HCL 25 MG
50 CAPSULE ORAL ONCE
Status: DISCONTINUED | OUTPATIENT
Start: 2022-09-07 | End: 2022-09-09 | Stop reason: HOSPADM

## 2022-09-07 RX ORDER — EPINEPHRINE 1 MG/ML
0.3 INJECTION, SOLUTION INTRAMUSCULAR; SUBCUTANEOUS ONCE AS NEEDED
Status: CANCELLED
Start: 2022-10-05

## 2022-09-07 RX ORDER — PREDNISONE 20 MG/1
40 TABLET ORAL ONCE
Status: CANCELLED
Start: 2022-10-05 | End: 2022-10-05

## 2022-09-07 RX ORDER — METHYLPREDNISOLONE SODIUM SUCCINATE 125 MG/2ML
50 INJECTION, POWDER, LYOPHILIZED, FOR SOLUTION INTRAMUSCULAR; INTRAVENOUS ONCE AS NEEDED
Status: CANCELLED
Start: 2022-10-05

## 2022-09-07 RX ORDER — SODIUM CHLORIDE 0.9 % (FLUSH) 0.9 %
10 SYRINGE (ML) INJECTION AS NEEDED
Status: DISCONTINUED | OUTPATIENT
Start: 2022-09-07 | End: 2022-09-09 | Stop reason: HOSPADM

## 2022-09-07 RX ORDER — HEPARIN SODIUM (PORCINE) LOCK FLUSH IV SOLN 100 UNIT/ML 100 UNIT/ML
500 SOLUTION INTRAVENOUS AS NEEDED
Status: CANCELLED | OUTPATIENT
Start: 2022-09-07

## 2022-09-07 RX ORDER — SODIUM CHLORIDE 0.9 % (FLUSH) 0.9 %
10 SYRINGE (ML) INJECTION AS NEEDED
Status: CANCELLED | OUTPATIENT
Start: 2022-09-07

## 2022-09-07 RX ORDER — PREDNISONE 20 MG/1
40 TABLET ORAL ONCE
Status: DISCONTINUED | OUTPATIENT
Start: 2022-09-07 | End: 2022-09-09 | Stop reason: HOSPADM

## 2022-09-07 RX ADMIN — IMMUNE GLOBULIN INFUSION (HUMAN) 20 G: 100 INJECTION, SOLUTION INTRAVENOUS; SUBCUTANEOUS at 09:32

## 2022-09-07 RX ADMIN — SODIUM CHLORIDE 500 ML: 9 INJECTION, SOLUTION INTRAVENOUS at 08:50

## 2022-09-07 RX ADMIN — HEPARIN 500 UNITS: 100 SYRINGE at 13:15

## 2022-09-07 RX ADMIN — IMMUNE GLOBULIN INFUSION (HUMAN) 20 G: 100 INJECTION, SOLUTION INTRAVENOUS; SUBCUTANEOUS at 11:28

## 2022-09-07 RX ADMIN — Medication 10 ML: at 13:15

## 2022-09-20 ENCOUNTER — PATIENT MESSAGE (OUTPATIENT)
Dept: INTERNAL MEDICINE | Facility: CLINIC | Age: 67
End: 2022-09-20

## 2022-09-20 NOTE — TELEPHONE ENCOUNTER
From: Raquel Mariee  To: Sanjeev Rawls MD  Sent: 9/20/2022 12:48 PM EDT  Subject: Vaccines and ubrelvy.    Hi Klein, just an fyi. I had influenza and pneumonia vaccines on Sept. 16. Iam had his influenza vacc that day also. Please add vaccines to our charts. Do you happen to have any Ubrelvy samples available?? Thanx Lilian

## 2022-09-25 DIAGNOSIS — F41.9 ANXIETY: ICD-10-CM

## 2022-09-26 RX ORDER — CLONAZEPAM 0.5 MG/1
0.5 TABLET ORAL NIGHTLY PRN
Qty: 30 TABLET | Refills: 2 | Status: SHIPPED | OUTPATIENT
Start: 2022-09-26 | End: 2023-01-03 | Stop reason: SDUPTHER

## 2022-10-06 ENCOUNTER — HOSPITAL ENCOUNTER (OUTPATIENT)
Dept: INFUSION THERAPY | Facility: HOSPITAL | Age: 67
Setting detail: INFUSION SERIES
Discharge: HOME | End: 2022-10-06

## 2022-10-06 VITALS
SYSTOLIC BLOOD PRESSURE: 173 MMHG | TEMPERATURE: 98.2 F | HEART RATE: 79 BPM | DIASTOLIC BLOOD PRESSURE: 64 MMHG | OXYGEN SATURATION: 96 % | RESPIRATION RATE: 18 BRPM

## 2022-10-06 DIAGNOSIS — Z95.828 PORT-A-CATH IN PLACE: ICD-10-CM

## 2022-10-06 DIAGNOSIS — D80.1 COMMON VARIABLE AGAMMAGLOBULINEMIA: Primary | ICD-10-CM

## 2022-10-06 LAB
ALBUMIN SERPL-MCNC: 4.3 G/DL (ref 3.5–5.2)
ALBUMIN/GLOB SERPL: 1.7 G/DL
ALP SERPL-CCNC: 61 U/L (ref 39–117)
ALT SERPL W P-5'-P-CCNC: 18 U/L (ref 1–33)
ANION GAP SERPL CALCULATED.3IONS-SCNC: 9.6 MMOL/L (ref 5–15)
AST SERPL-CCNC: 25 U/L (ref 1–32)
BASOPHILS # BLD AUTO: 0.07 10*3/MM3 (ref 0–0.2)
BASOPHILS NFR BLD AUTO: 1.4 % (ref 0–1.5)
BILIRUB SERPL-MCNC: 0.3 MG/DL (ref 0–1.2)
BUN SERPL-MCNC: 11 MG/DL (ref 8–23)
BUN/CREAT SERPL: 9.8 (ref 7–25)
CALCIUM SPEC-SCNC: 9.3 MG/DL (ref 8.6–10.5)
CHLORIDE SERPL-SCNC: 99 MMOL/L (ref 98–107)
CO2 SERPL-SCNC: 24.4 MMOL/L (ref 22–29)
CREAT SERPL-MCNC: 1.12 MG/DL (ref 0.57–1)
DEPRECATED RDW RBC AUTO: 44.1 FL (ref 37–54)
EGFRCR SERPLBLD CKD-EPI 2021: 54.3 ML/MIN/1.73
EOSINOPHIL # BLD AUTO: 0.15 10*3/MM3 (ref 0–0.4)
EOSINOPHIL NFR BLD AUTO: 2.9 % (ref 0.3–6.2)
ERYTHROCYTE [DISTWIDTH] IN BLOOD BY AUTOMATED COUNT: 12.9 % (ref 12.3–15.4)
GLOBULIN UR ELPH-MCNC: 2.5 GM/DL
GLUCOSE SERPL-MCNC: 132 MG/DL (ref 65–99)
HCT VFR BLD AUTO: 35 % (ref 34–46.6)
HGB BLD-MCNC: 12 G/DL (ref 12–15.9)
IGG1 SER-MCNC: 1053 MG/DL (ref 700–1600)
IMM GRANULOCYTES # BLD AUTO: 0.01 10*3/MM3 (ref 0–0.05)
IMM GRANULOCYTES NFR BLD AUTO: 0.2 % (ref 0–0.5)
LYMPHOCYTES # BLD AUTO: 1.02 10*3/MM3 (ref 0.7–3.1)
LYMPHOCYTES NFR BLD AUTO: 20 % (ref 19.6–45.3)
MCH RBC QN AUTO: 32.1 PG (ref 26.6–33)
MCHC RBC AUTO-ENTMCNC: 34.3 G/DL (ref 31.5–35.7)
MCV RBC AUTO: 93.6 FL (ref 79–97)
MONOCYTES # BLD AUTO: 0.25 10*3/MM3 (ref 0.1–0.9)
MONOCYTES NFR BLD AUTO: 4.9 % (ref 5–12)
NEUTROPHILS NFR BLD AUTO: 3.6 10*3/MM3 (ref 1.7–7)
NEUTROPHILS NFR BLD AUTO: 70.6 % (ref 42.7–76)
NRBC BLD AUTO-RTO: 0 /100 WBC (ref 0–0.2)
PLATELET # BLD AUTO: 244 10*3/MM3 (ref 140–450)
PMV BLD AUTO: 8.8 FL (ref 6–12)
POTASSIUM SERPL-SCNC: 4.5 MMOL/L (ref 3.5–5.2)
PROT SERPL-MCNC: 6.8 G/DL (ref 6–8.5)
RBC # BLD AUTO: 3.74 10*6/MM3 (ref 3.77–5.28)
SODIUM SERPL-SCNC: 133 MMOL/L (ref 136–145)
WBC NRBC COR # BLD: 5.1 10*3/MM3 (ref 3.4–10.8)

## 2022-10-06 PROCEDURE — 96361 HYDRATE IV INFUSION ADD-ON: CPT

## 2022-10-06 PROCEDURE — 96366 THER/PROPH/DIAG IV INF ADDON: CPT

## 2022-10-06 PROCEDURE — 36591 DRAW BLOOD OFF VENOUS DEVICE: CPT

## 2022-10-06 PROCEDURE — 25010000002 HEPARIN LOCK FLUSH PER 10 UNITS: Performed by: ALLERGY & IMMUNOLOGY

## 2022-10-06 PROCEDURE — 25010000002 IMMUNE GLOBULIN (HUMAN) 20 GM/200ML SOLUTION: Performed by: ALLERGY & IMMUNOLOGY

## 2022-10-06 PROCEDURE — 80053 COMPREHEN METABOLIC PANEL: CPT | Performed by: ALLERGY & IMMUNOLOGY

## 2022-10-06 PROCEDURE — 96365 THER/PROPH/DIAG IV INF INIT: CPT

## 2022-10-06 PROCEDURE — 82784 ASSAY IGA/IGD/IGG/IGM EACH: CPT | Performed by: ALLERGY & IMMUNOLOGY

## 2022-10-06 PROCEDURE — 85025 COMPLETE CBC W/AUTO DIFF WBC: CPT | Performed by: ALLERGY & IMMUNOLOGY

## 2022-10-06 RX ORDER — HEPARIN SODIUM (PORCINE) LOCK FLUSH IV SOLN 100 UNIT/ML 100 UNIT/ML
500 SOLUTION INTRAVENOUS AS NEEDED
Status: DISCONTINUED | OUTPATIENT
Start: 2022-10-06 | End: 2022-10-09 | Stop reason: HOSPADM

## 2022-10-06 RX ORDER — SODIUM CHLORIDE 0.9 % (FLUSH) 0.9 %
10 SYRINGE (ML) INJECTION AS NEEDED
Status: CANCELLED | OUTPATIENT
Start: 2022-10-06

## 2022-10-06 RX ORDER — DIPHENHYDRAMINE HCL 25 MG
50 CAPSULE ORAL ONCE
Status: DISCONTINUED | OUTPATIENT
Start: 2022-10-06 | End: 2022-10-09 | Stop reason: HOSPADM

## 2022-10-06 RX ORDER — METHYLPREDNISOLONE SODIUM SUCCINATE 125 MG/2ML
50 INJECTION, POWDER, LYOPHILIZED, FOR SOLUTION INTRAMUSCULAR; INTRAVENOUS ONCE AS NEEDED
Status: DISCONTINUED | OUTPATIENT
Start: 2022-10-06 | End: 2022-10-09 | Stop reason: HOSPADM

## 2022-10-06 RX ORDER — EPINEPHRINE 1 MG/ML
0.3 INJECTION, SOLUTION INTRAMUSCULAR; SUBCUTANEOUS ONCE AS NEEDED
Status: DISCONTINUED | OUTPATIENT
Start: 2022-10-06 | End: 2022-10-09 | Stop reason: HOSPADM

## 2022-10-06 RX ORDER — ACETAMINOPHEN 325 MG/1
325 TABLET ORAL ONCE
Status: DISCONTINUED | OUTPATIENT
Start: 2022-10-06 | End: 2022-10-09 | Stop reason: HOSPADM

## 2022-10-06 RX ORDER — DIPHENHYDRAMINE HCL 25 MG
50 CAPSULE ORAL ONCE AS NEEDED
Status: CANCELLED
Start: 2022-11-03

## 2022-10-06 RX ORDER — PREDNISONE 20 MG/1
40 TABLET ORAL ONCE AS NEEDED
Status: DISCONTINUED | OUTPATIENT
Start: 2022-10-06 | End: 2022-10-09 | Stop reason: HOSPADM

## 2022-10-06 RX ORDER — DIPHENHYDRAMINE HCL 25 MG
50 CAPSULE ORAL ONCE AS NEEDED
Status: DISCONTINUED | OUTPATIENT
Start: 2022-10-06 | End: 2022-10-09 | Stop reason: HOSPADM

## 2022-10-06 RX ORDER — PREDNISONE 20 MG/1
40 TABLET ORAL ONCE
Status: CANCELLED
Start: 2022-11-03 | End: 2022-11-03

## 2022-10-06 RX ORDER — PREDNISONE 20 MG/1
40 TABLET ORAL ONCE AS NEEDED
Status: CANCELLED
Start: 2022-11-03

## 2022-10-06 RX ORDER — ACETAMINOPHEN 325 MG/1
325 TABLET ORAL ONCE
Status: CANCELLED | OUTPATIENT
Start: 2022-11-03

## 2022-10-06 RX ORDER — METHYLPREDNISOLONE SODIUM SUCCINATE 125 MG/2ML
50 INJECTION, POWDER, LYOPHILIZED, FOR SOLUTION INTRAMUSCULAR; INTRAVENOUS ONCE AS NEEDED
Status: CANCELLED
Start: 2022-11-03

## 2022-10-06 RX ORDER — SODIUM CHLORIDE 0.9 % (FLUSH) 0.9 %
10 SYRINGE (ML) INJECTION AS NEEDED
Status: DISCONTINUED | OUTPATIENT
Start: 2022-10-06 | End: 2022-10-09 | Stop reason: HOSPADM

## 2022-10-06 RX ORDER — PREDNISONE 20 MG/1
40 TABLET ORAL ONCE
Status: DISCONTINUED | OUTPATIENT
Start: 2022-10-06 | End: 2022-10-09 | Stop reason: HOSPADM

## 2022-10-06 RX ORDER — HEPARIN SODIUM (PORCINE) LOCK FLUSH IV SOLN 100 UNIT/ML 100 UNIT/ML
500 SOLUTION INTRAVENOUS AS NEEDED
Status: CANCELLED | OUTPATIENT
Start: 2022-10-06

## 2022-10-06 RX ORDER — EPINEPHRINE 1 MG/ML
0.3 INJECTION, SOLUTION INTRAMUSCULAR; SUBCUTANEOUS ONCE AS NEEDED
Status: CANCELLED
Start: 2022-11-03

## 2022-10-06 RX ORDER — DIPHENHYDRAMINE HCL 25 MG
50 CAPSULE ORAL ONCE
Status: CANCELLED
Start: 2022-11-03 | End: 2022-11-03

## 2022-10-06 RX ADMIN — HEPARIN 500 UNITS: 100 SYRINGE at 13:42

## 2022-10-06 RX ADMIN — Medication 10 ML: at 13:42

## 2022-10-06 RX ADMIN — IMMUNE GLOBULIN INFUSION (HUMAN) 20 G: 100 INJECTION, SOLUTION INTRAVENOUS; SUBCUTANEOUS at 11:38

## 2022-10-06 RX ADMIN — SODIUM CHLORIDE 500 ML: 9 INJECTION, SOLUTION INTRAVENOUS at 09:00

## 2022-10-06 RX ADMIN — IMMUNE GLOBULIN INFUSION (HUMAN) 20 G: 100 INJECTION, SOLUTION INTRAVENOUS; SUBCUTANEOUS at 09:37

## 2022-10-07 DIAGNOSIS — Z79.4 TYPE 2 DIABETES MELLITUS WITHOUT COMPLICATION, WITH LONG-TERM CURRENT USE OF INSULIN: Primary | ICD-10-CM

## 2022-10-07 DIAGNOSIS — E11.9 TYPE 2 DIABETES MELLITUS WITHOUT COMPLICATION, WITH LONG-TERM CURRENT USE OF INSULIN: Primary | ICD-10-CM

## 2022-10-11 LAB — HBA1C MFR BLD: 6.1 % (ref 4.8–5.6)

## 2022-10-21 ENCOUNTER — OFFICE VISIT (OUTPATIENT)
Dept: INTERNAL MEDICINE | Facility: CLINIC | Age: 67
End: 2022-10-21

## 2022-10-21 VITALS
TEMPERATURE: 97.9 F | SYSTOLIC BLOOD PRESSURE: 138 MMHG | HEIGHT: 64 IN | BODY MASS INDEX: 35.68 KG/M2 | OXYGEN SATURATION: 99 % | RESPIRATION RATE: 15 BRPM | DIASTOLIC BLOOD PRESSURE: 80 MMHG | HEART RATE: 88 BPM | WEIGHT: 209 LBS

## 2022-10-21 DIAGNOSIS — E11.9 TYPE 2 DIABETES MELLITUS WITHOUT COMPLICATION, WITHOUT LONG-TERM CURRENT USE OF INSULIN: ICD-10-CM

## 2022-10-21 DIAGNOSIS — H91.90 HEARING LOSS, UNSPECIFIED HEARING LOSS TYPE, UNSPECIFIED LATERALITY: Primary | ICD-10-CM

## 2022-10-21 DIAGNOSIS — E03.9 ACQUIRED HYPOTHYROIDISM: ICD-10-CM

## 2022-10-21 PROCEDURE — 99214 OFFICE O/P EST MOD 30 MIN: CPT | Performed by: INTERNAL MEDICINE

## 2022-10-21 NOTE — PROGRESS NOTES
Subjective     Patient ID: Raquel Mariee is a 66 y.o. female. Patient is here for management of multiple medical problems.     Chief Complaint   Patient presents with   • Weight Loss     History of Present Illness     hearingloss wants audiology consult.      Weight loss.          The following portions of the patient's history were reviewed and updated as appropriate: allergies, current medications, past family history, past medical history, past social history, past surgical history and problem list.    Review of Systems   Constitutional: Negative for fatigue.   Psychiatric/Behavioral: Negative for self-injury and sleep disturbance. The patient is not nervous/anxious.    All other systems reviewed and are negative.      Current Outpatient Medications:   •  acetaminophen-codeine (TYLENOL #3) 300-30 MG per tablet, Take 1 tablet by mouth Every 4 (Four) Hours As Needed for Moderate Pain ., Disp: 10 tablet, Rfl: 0  •  albuterol sulfate  (90 Base) MCG/ACT inhaler, Inhale 2 puffs by mouth every 4-6 hours as needed for cough, wheeze, shortness of breath. Use with vortex spacer, Disp: 8.5 g, Rfl: 3  •  albuterol sulfate  (90 Base) MCG/ACT inhaler, Inhale 2 puffs by mouth every 4 to 6 hours as needed for cough, wheeze, shortness of breath. Use with vortex spacer, Disp: 8.5 g, Rfl: 3  •  allopurinol (Zyloprim) 100 MG tablet, Take 1 tablet by mouth Daily., Disp: 90 tablet, Rfl: 3  •  azithromycin (Zithromax Z-Russ) 250 MG tablet, Take 2 tablets by mouth on day 1, then take 1 tablet daily on days 2-5, Disp: 6 tablet, Rfl: 0  •  betamethasone valerate (VALISONE) 0.1 % cream, Apply topically to the appropriate area as directed Daily., Disp: 45 g, Rfl: 11  •  Budeson-Glycopyrrol-Formoterol (Breztri Aerosphere) 160-9-4.8 MCG/ACT aerosol inhaler, Inhale 2 puffs by mouth twice daily. Rinse mouth after use, Disp: 10.7 g, Rfl: 11  •  buPROPion SR (WELLBUTRIN SR) 150 MG 12 hr tablet, Take 1 tablet by mouth 2 (Two)  Times a Day., Disp: 180 tablet, Rfl: 3  •  calcium carbonate (Antacid Maximum) 1000 MG chewable tablet, Chew 500 mg 3 (Three) Times a Day., Disp: , Rfl:   •  cholecalciferol (VITAMIN D3) 25 MCG (1000 UT) tablet, Take 1,000 Units by mouth Daily., Disp: , Rfl:   •  clonazePAM (KlonoPIN) 0.5 MG tablet, Take 1 tablet by mouth At Night As Needed, Disp: 30 tablet, Rfl: 2  •  dicyclomine (BENTYL) 10 MG capsule, Take 1 capsule by mouth 3 (Three) Times a Day., Disp: 270 capsule, Rfl: 3  •  diphenhydrAMINE (Benadryl Allergy) 25 MG tablet, Take 1-2 tablets by mouth Every 4-6 Hours As Needed., Disp: 90 tablet, Rfl: 11  •  ferrous sulfate 325 (65 FE) MG EC tablet, Take 325 mg by mouth., Disp: , Rfl:   •  fexofenadine (ALLEGRA) 180 MG tablet, Take 180 mg by mouth Daily., Disp: , Rfl:   •  fluticasone (FLONASE) 50 MCG/ACT nasal spray, Instill 2 sprays into each nostril daily. (Patient taking differently: 2 sprays by Each Nare route 2 (Two) Times a Day.), Disp: 16 g, Rfl: 11  •  furosemide (LASIX) 40 MG tablet, Take 1 tablet by mouth Daily. Two days a month., Disp: 30 tablet, Rfl: 1  •  glucosamine-chondroitin 500-400 MG capsule capsule, Take 1 capsule by mouth., Disp: , Rfl:   •  HYDROcod Polst-CPM Polst ER (Tussionex Pennkinetic ER) 10-8 MG/5ML ER suspension, Take 1 teaspoonful (5mL) by mouth Every 12 (Twelve) Hours As Needed for Cough., Disp: 60 mL, Rfl: 0  •  ipratropium (ATROVENT) 0.06 % nasal spray, Instill 1-2 sprays each nostril 2-3 times daily as needed, Disp: 15 mL, Rfl: 3  •  ipratropium-albuterol (DUO-NEB) 0.5-2.5 mg/3 ml nebulizer, Inhale contents of 1 vial (3mL) via nebulizer every 4-6 hours as needed for coughing, wheezing, or shortness of breath., Disp: 270 mL, Rfl: 3  •  ipratropium-albuterol (DUO-NEB) 0.5-2.5 mg/3 ml nebulizer, Inhale the contents of 1 vial into nebulizer every 4-6 hours as needed for cough,wheezing and shortness of breath., Disp: 90 mL, Rfl: 3  •  isosorbide mononitrate (IMDUR) 30 MG 24 hr  tablet, Take 1 tablet by mouth Every Morning., Disp: 90 tablet, Rfl: 3  •  levothyroxine (SYNTHROID, LEVOTHROID) 50 MCG tablet, Take 1 tablet by mouth Daily., Disp: 90 tablet, Rfl: 3  •  Magnesium 250 MG tablet, Take 500 mg by mouth 2 (Two) Times a Day., Disp: , Rfl:   •  melatonin 5 MG tablet tablet, Take 10 mg by mouth Every Night., Disp: , Rfl:   •  montelukast (SINGULAIR) 10 MG tablet, Take 1 tablet by mouth every night at bedtime., Disp: 30 tablet, Rfl: 11  •  montelukast (SINGULAIR) 10 MG tablet, Take 1 tablet by mouth every night at bedtime., Disp: 30 tablet, Rfl: 11  •  multivitamin with minerals tablet tablet, Take 1 tablet by mouth Daily., Disp: , Rfl:   •  omeprazole (priLOSEC) 40 MG capsule, Take 1 capsule by mouth 2 (Two) Times a Day., Disp: 180 capsule, Rfl: 3  •  ondansetron (ZOFRAN) 4 MG tablet, Take 1 tablet by mouth Every 8 (Eight) Hours As Needed for Nausea or Vomiting., Disp: 30 tablet, Rfl: 11  •  POTASSIUM CHLORIDE PO, Take 20 mEq by mouth See Admin Instructions. Takes two times a month with Lasix, Disp: , Rfl:   •  potassium citrate (UROCIT-K) 10 MEQ (1080 MG) CR tablet, Take 1 tablet by mouth Daily., Disp: 90 tablet, Rfl: 3  •  predniSONE (DELTASONE) 20 MG tablet, Take 2 tablets prior to Gammgard infusion, Disp: 6 tablet, Rfl: 3  •  predniSONE (DELTASONE) 20 MG tablet, Take 2 tablets by mouth prior to Gammgard infusion, Disp: 6 tablet, Rfl: 3  •  Spacer/Aero-Holding Chambers (AeroChamber Plus Wali-Vu) misc, Use as directed with albuterol inhaler, Disp: 1 each, Rfl: 0  •  Spacer/Aero-Holding Chambers (AeroChamber Plus Wali-Vu) misc, Use as directed with inhaler, Disp: 1 each, Rfl: 0  •  tiZANidine (ZANAFLEX) 4 MG tablet, Take 1 tablet by mouth Every Night., Disp: 90 tablet, Rfl: 1  •  valsartan (DIOVAN) 160 MG tablet, Take 1 tablet by mouth Daily., Disp: 90 tablet, Rfl: 3  •  vitamin B-12 (CYANOCOBALAMIN) 1000 MCG tablet, Take 1,000 mcg by mouth Daily., Disp: , Rfl:     Current  "Facility-Administered Medications:   •  heparin injection 500 Units, 5 mL, Intravenous, PRN, Sanjeev Rawls MD  •  sodium chloride 0.9 % flush 10 mL, 10 mL, Intravenous, Q12H, Sanjeev Rawls MD  •  sodium chloride 0.9 % flush 10 mL, 10 mL, Intravenous, PRN, Sanjeev Rawls MD  •  sodium chloride 0.9 % flush 20 mL, 20 mL, Intravenous, PRN, Sanjeev Rawls MD    Objective      Blood pressure 138/80, pulse 88, temperature 97.9 °F (36.6 °C), resp. rate 15, height 162.6 cm (64.02\"), weight 94.8 kg (209 lb), SpO2 99 %, not currently breastfeeding.    Physical Exam     General Appearance:    Alert, cooperative, no distress, appears stated age   Head:    Normocephalic, without obvious abnormality, atraumatic   Eyes:    PERRL, conjunctiva/corneas clear, EOM's intact   Ears:    Normal TM's and external ear canals, both ears   Nose:   Nares normal, septum midline, mucosa normal, no drainage   or sinus tenderness   Throat:   Lips, mucosa, and tongue normal; teeth and gums normal   Neck:   Supple, symmetrical, trachea midline, no adenopathy;        thyroid:  No enlargement/tenderness/nodules; no carotid    bruit or JVD   Back:     Symmetric, no curvature, ROM normal, no CVA tenderness   Lungs:     Clear to auscultation bilaterally, respirations unlabored   Chest wall:    No tenderness or deformity   Heart:    Regular rate and rhythm, S1 and S2 normal, no murmur,        rub or gallop   Abdomen:     Soft, non-tender, bowel sounds active all four quadrants,     no masses, no organomegaly   Extremities:   Extremities normal, atraumatic, no cyanosis or edema   Pulses:   2+ and symmetric all extremities   Skin:   Skin color, texture, turgor normal, no rashes or lesions   Lymph nodes:   Cervical, supraclavicular, and axillary nodes normal   Neurologic:   CNII-XII intact. Normal strength, sensation and reflexes       throughout      Results for orders placed or performed in visit on 10/07/22   Hemoglobin A1c    Specimen: Blood "   Result Value Ref Range    Hemoglobin A1C 6.10 (H) 4.80 - 5.60 %         Assessment & Plan   migrains and ubrevay is the only viable treatment. Chest pain on triptans.        Diagnoses and all orders for this visit:    1. Hearing loss, unspecified hearing loss type, unspecified laterality (Primary)  -     Ambulatory Referral to Audiology  -     Comprehensive Metabolic Panel  -     Hemoglobin A1c    2. Type 2 diabetes mellitus without complication, without long-term current use of insulin (HCC)  -     Comprehensive Metabolic Panel  -     Hemoglobin A1c    3. Acquired hypothyroidism  -     Comprehensive Metabolic Panel  -     Hemoglobin A1c      Return in about 6 months (around 4/21/2023).          There are no Patient Instructions on file for this visit.     Sanjeev Rawls MD    Assessment & Plan

## 2022-11-03 ENCOUNTER — APPOINTMENT (OUTPATIENT)
Dept: INFUSION THERAPY | Facility: HOSPITAL | Age: 67
End: 2022-11-03

## 2022-11-21 RX ORDER — AZITHROMYCIN 250 MG/1
TABLET, FILM COATED ORAL
Qty: 6 TABLET | Refills: 0 | Status: SHIPPED | OUTPATIENT
Start: 2022-11-21 | End: 2022-12-28 | Stop reason: SDUPTHER

## 2022-12-01 ENCOUNTER — HOSPITAL ENCOUNTER (OUTPATIENT)
Dept: INFUSION THERAPY | Facility: HOSPITAL | Age: 67
Setting detail: INFUSION SERIES
Discharge: HOME | End: 2022-12-01

## 2022-12-01 VITALS
BODY MASS INDEX: 36.37 KG/M2 | OXYGEN SATURATION: 97 % | HEART RATE: 79 BPM | WEIGHT: 212 LBS | TEMPERATURE: 97.8 F | DIASTOLIC BLOOD PRESSURE: 85 MMHG | RESPIRATION RATE: 18 BRPM | SYSTOLIC BLOOD PRESSURE: 158 MMHG

## 2022-12-01 DIAGNOSIS — Z95.828 PORT-A-CATH IN PLACE: ICD-10-CM

## 2022-12-01 DIAGNOSIS — D80.1 COMMON VARIABLE AGAMMAGLOBULINEMIA: Primary | ICD-10-CM

## 2022-12-01 PROCEDURE — 96366 THER/PROPH/DIAG IV INF ADDON: CPT

## 2022-12-01 PROCEDURE — 96365 THER/PROPH/DIAG IV INF INIT: CPT

## 2022-12-01 PROCEDURE — 25010000002 IMMUNE GLOBULIN (HUMAN) 20 GM/200ML SOLUTION: Performed by: ALLERGY & IMMUNOLOGY

## 2022-12-01 PROCEDURE — 96361 HYDRATE IV INFUSION ADD-ON: CPT

## 2022-12-01 PROCEDURE — 25010000002 HEPARIN LOCK FLUSH PER 10 UNITS: Performed by: ALLERGY & IMMUNOLOGY

## 2022-12-01 RX ORDER — PREDNISONE 20 MG/1
40 TABLET ORAL ONCE
Status: DISCONTINUED | OUTPATIENT
Start: 2022-12-01 | End: 2022-12-03 | Stop reason: HOSPADM

## 2022-12-01 RX ORDER — EPINEPHRINE 1 MG/ML
0.3 INJECTION, SOLUTION INTRAMUSCULAR; SUBCUTANEOUS ONCE AS NEEDED
Status: CANCELLED
Start: 2022-12-29

## 2022-12-01 RX ORDER — ACETAMINOPHEN 325 MG/1
325 TABLET ORAL ONCE
Status: DISCONTINUED | OUTPATIENT
Start: 2022-12-01 | End: 2022-12-03 | Stop reason: HOSPADM

## 2022-12-01 RX ORDER — SODIUM CHLORIDE 0.9 % (FLUSH) 0.9 %
10 SYRINGE (ML) INJECTION AS NEEDED
Status: DISCONTINUED | OUTPATIENT
Start: 2022-12-01 | End: 2022-12-03 | Stop reason: HOSPADM

## 2022-12-01 RX ORDER — DIPHENHYDRAMINE HCL 25 MG
50 CAPSULE ORAL ONCE AS NEEDED
Status: CANCELLED
Start: 2022-12-29

## 2022-12-01 RX ORDER — HEPARIN SODIUM (PORCINE) LOCK FLUSH IV SOLN 100 UNIT/ML 100 UNIT/ML
500 SOLUTION INTRAVENOUS AS NEEDED
Status: CANCELLED | OUTPATIENT
Start: 2022-12-01

## 2022-12-01 RX ORDER — DIPHENHYDRAMINE HCL 25 MG
50 CAPSULE ORAL ONCE
Status: DISCONTINUED | OUTPATIENT
Start: 2022-12-01 | End: 2022-12-03 | Stop reason: HOSPADM

## 2022-12-01 RX ORDER — SODIUM CHLORIDE 0.9 % (FLUSH) 0.9 %
10 SYRINGE (ML) INJECTION AS NEEDED
Status: CANCELLED | OUTPATIENT
Start: 2022-12-01

## 2022-12-01 RX ORDER — PREDNISONE 20 MG/1
40 TABLET ORAL ONCE AS NEEDED
Status: CANCELLED
Start: 2022-12-29

## 2022-12-01 RX ORDER — ACETAMINOPHEN 325 MG/1
325 TABLET ORAL ONCE
Status: CANCELLED | OUTPATIENT
Start: 2022-12-29

## 2022-12-01 RX ORDER — DIPHENHYDRAMINE HCL 25 MG
50 CAPSULE ORAL ONCE
Status: CANCELLED
Start: 2022-12-29 | End: 2022-12-29

## 2022-12-01 RX ORDER — METHYLPREDNISOLONE SODIUM SUCCINATE 125 MG/2ML
50 INJECTION, POWDER, LYOPHILIZED, FOR SOLUTION INTRAMUSCULAR; INTRAVENOUS ONCE AS NEEDED
Status: CANCELLED
Start: 2022-12-29

## 2022-12-01 RX ORDER — PREDNISONE 20 MG/1
40 TABLET ORAL ONCE
Status: CANCELLED
Start: 2022-12-29 | End: 2022-12-29

## 2022-12-01 RX ORDER — HEPARIN SODIUM (PORCINE) LOCK FLUSH IV SOLN 100 UNIT/ML 100 UNIT/ML
500 SOLUTION INTRAVENOUS AS NEEDED
Status: DISCONTINUED | OUTPATIENT
Start: 2022-12-01 | End: 2022-12-03 | Stop reason: HOSPADM

## 2022-12-01 RX ADMIN — IMMUNE GLOBULIN INFUSION (HUMAN) 20 G: 100 INJECTION, SOLUTION INTRAVENOUS; SUBCUTANEOUS at 09:36

## 2022-12-01 RX ADMIN — IMMUNE GLOBULIN INFUSION (HUMAN) 20 G: 100 INJECTION, SOLUTION INTRAVENOUS; SUBCUTANEOUS at 11:32

## 2022-12-01 RX ADMIN — SODIUM CHLORIDE 500 ML: 9 INJECTION, SOLUTION INTRAVENOUS at 08:59

## 2022-12-01 RX ADMIN — Medication 10 ML: at 13:03

## 2022-12-01 RX ADMIN — HEPARIN 500 UNITS: 100 SYRINGE at 13:04

## 2022-12-14 DIAGNOSIS — G89.29 CHRONIC BILATERAL LOW BACK PAIN WITHOUT SCIATICA: ICD-10-CM

## 2022-12-14 DIAGNOSIS — G89.29 CHRONIC RIGHT SHOULDER PAIN: ICD-10-CM

## 2022-12-14 DIAGNOSIS — E11.9 TYPE 2 DIABETES MELLITUS WITHOUT COMPLICATION, WITH LONG-TERM CURRENT USE OF INSULIN: ICD-10-CM

## 2022-12-14 DIAGNOSIS — M54.50 CHRONIC BILATERAL LOW BACK PAIN WITHOUT SCIATICA: ICD-10-CM

## 2022-12-14 DIAGNOSIS — Z79.4 TYPE 2 DIABETES MELLITUS WITHOUT COMPLICATION, WITH LONG-TERM CURRENT USE OF INSULIN: ICD-10-CM

## 2022-12-14 DIAGNOSIS — M25.511 CHRONIC RIGHT SHOULDER PAIN: ICD-10-CM

## 2022-12-14 RX ORDER — ACETAMINOPHEN AND CODEINE PHOSPHATE 300; 30 MG/1; MG/1
1 TABLET ORAL EVERY 4 HOURS PRN
Qty: 10 TABLET | Refills: 0 | Status: SHIPPED | OUTPATIENT
Start: 2022-12-14 | End: 2023-03-29 | Stop reason: SDUPTHER

## 2022-12-14 NOTE — PROGRESS NOTES
[facility] Cardiology Office Follow Up Note    Raquel Mariee  [id number]  02/15/2022    Primary Care Provider: Sanjeev Rawls MD    Chief Complaint: Routine follow-up    History of Present Illness:   Mrs. Raquel Mariee is a 66 y.o. female who presents to the Cardiology Clinic for regular follow-up.   The patient has a past medical history significant for common variable immunodeficiency on chronic IVIG infusions, stage III chronic kidney disease, hypothyroidism, anemia, type 2 diabetes mellitus, and hypertension.  She has a past cardiac history significant for chronic diastolic heart failure, Prinzmetal's angina maintained on isosorbide, and mild aortic stenosis based on echocardiogram in 2020.   She presents today for routine follow-up.  Since her last appointment, the patient reports he has been doing well without significant changes in her health.  She remains active, without exertional chest pain or chest discomfort.  She does have chronic intermittent dyspnea in the setting of underlying asthma.  She denies any recent worsening of her exertional dyspnea.  No orthopnea or significant lower extremity swelling.  She reports she has previously been taken off her Lasix without recurrent lower extremity edema.  No recent episodes of palpitations.  No presyncopal or syncopal events.  No other specific complaints today.    Past Cardiac Testin. Last Coronary Angio: None  2. Prior Stress Testing: Unknown  3. Last Echo: 12/15/2020              1.  Normal left ventricular size and systolic function, LVEF 60-65%.              2.  Mild concentric LVH.              3.  Normal LV diastolic filling pattern.              4.  Normal right ventricular size and systolic function.              5.  Mild calcification of the aortic valve with mild aortic stenosis (mean gradient 15 mmHg, max velocity 254 cm/s)              6.  Trace aortic regurgitation.              7.  Trace MR and  18.6 Discharge Summary: Vaginal Delivery     Admit Date: 2019  5:46 PM    Admit Diagnosis: Pregnant [Z34.90]    Date of Discharge:  2019    Discharge Diagnosis: Same        Hospital Course  Patient is a 26 y.o. female, G 2, now P 1101. S/P . PPD#0. Patient underwent a  today. Infant is being transferred. Patient requests to be discharged to home to be with infant. No complaints.     Procedures Performed  Normal Spontaneous Vaginal Delivery         Vital Signs  Temp:  [96.6 °F (35.9 °C)-98.6 °F (37 °C)] 98.6 °F (37 °C)  Heart Rate:  [] 86  Resp:  [18] 18  BP: ()/() 127/63    Review of Systems    The following systems were reviewed and negative;  ENT, respiratory, cardiovascular, gastrointestinal, genitourinary, breast, endocrine and allergies / immunologic.      Physical Exam:      General Appearance:    Alert, cooperative, in no acute distress   Head:    Normocephalic, without obvious abnormality, atraumatic   Eyes:            Lids and lashes normal, conjunctivae and sclerae normal, no   icterus, no pallor, corneas clear, PERRLA   Ears:    Ears appear intact with no abnormalities noted   Throat:   No oral lesions, no thrush, oral mucosa moist   Neck:   No adenopathy, supple, trachea midline, no thyromegaly, no     carotid bruit, no JVD   Back:     No kyphosis present, no scoliosis present, no skin lesions,       erythema or scars, no tenderness to percussion or                   palpation,   range of motion normal   Lungs:     Clear to auscultation,respirations regular, even and                   unlabored    Heart:    Regular rhythm and normal rate, normal S1 and S2, no            murmur, no gallop, no rub, no click   Breast Exam:    Deferred   Abdomen:     Normal bowel sounds, no masses, no organomegaly, soft        non-tender, non-distended, no guarding, no rebound                 tenderness   Genitalia:    Deferred   Extremities:   Moves all extremities well, no edema, no cyanosis,  TR.  4. Prior Holter Monitor: None    Review of Systems:   Review of Systems   Constitutional: Negative for activity change, appetite change, chills, diaphoresis, fatigue, fever, unexpected weight gain and unexpected weight loss.   Eyes: Negative for blurred vision and double vision.   Respiratory: Positive for shortness of breath. Negative for cough, chest tightness and wheezing.    Cardiovascular: Negative for chest pain, palpitations and leg swelling.   Gastrointestinal: Negative for abdominal pain, anal bleeding, blood in stool and GERD.   Endocrine: Negative for cold intolerance and heat intolerance.   Genitourinary: Negative for hematuria.   Neurological: Negative for dizziness, syncope, weakness and light-headedness.   Hematological: Does not bruise/bleed easily.   Psychiatric/Behavioral: Negative for depressed mood and stress. The patient is not nervous/anxious.        I have reviewed and/or updated the patient's past medical, past surgical, family, social history, problem list and allergies as appropriate.     Medications:     Current Outpatient Medications:   •  acetaminophen-codeine (TYLENOL #3) 300-30 MG per tablet, Take 1 tablet by mouth Every 4 (Four) Hours As Needed for Moderate Pain ., Disp: 10 tablet, Rfl: 0  •  albuterol sulfate  (90 Base) MCG/ACT inhaler, Inhale 2 puffs by mouth every 4-6 hours as needed for cough, wheeze, shortness of breath. Use with vortex spacer, Disp: 8.5 g, Rfl: 3  •  allopurinol (Zyloprim) 100 MG tablet, Take 1 tablet by mouth Daily., Disp: 90 tablet, Rfl: 3  •  Azelastine HCl 137 MCG/SPRAY solution, Instill 1-2 sprays in each nostril twice a day as needed, Disp: 30 mL, Rfl: 11  •  betamethasone valerate (VALISONE) 0.1 % cream, Apply topically to the appropriate area as directed Daily., Disp: 45 g, Rfl: 11  •  buPROPion SR (WELLBUTRIN SR) 150 MG 12 hr tablet, Take 1 tablet by mouth 2 (Two) Times a Day., Disp: 180 tablet, Rfl: 3  •  calcium carbonate (Antacid Maximum)  no              redness   Pulses:   Pulses palpable and equal bilaterally   Skin:   No bleeding, bruising or rash   Lymph nodes:   No palpable adenopathy   Neurologic:   Cranial nerves 2 - 12 grossly intact, sensation intact, DTR        present and equal bilaterally             Condition on Discharge:  Stable    Urine Output Good    Discharge Diet: Regular    Discharge Medications  Motrin 800 mg q 6 #30    Activity at Discharge: No driving x 2 weeks. Nothing per vagina until cleared by a physician. Patient expected to return to work or school in 6 weeks.     Follow-up Appointments  Patient will follow up with Dr. Donald in 2 weeks.        Boone Casper MD.   12/12/19  2:51 PM             1000 MG chewable tablet, Chew 500 mg 3 (Three) Times a Day., Disp: , Rfl:   •  cholecalciferol (VITAMIN D3) 25 MCG (1000 UT) tablet, Take 1,000 Units by mouth Daily., Disp: , Rfl:   •  clonazePAM (KlonoPIN) 0.5 MG tablet, Take 1 tablet by mouth At Night As Needed, Disp: 30 tablet, Rfl: 1  •  Dextromethorphan-guaiFENesin (Mucus-DM)  MG tablet sustained-release 12 hour, Take  by mouth., Disp: , Rfl:   •  dicyclomine (BENTYL) 10 MG capsule, Take 1 capsule by mouth 3 (Three) Times a Day., Disp: 270 capsule, Rfl: 3  •  diphenhydrAMINE (Benadryl Allergy) 25 MG tablet, Take 1-2 tablets by mouth Every 4-6 Hours As Needed., Disp: 90 tablet, Rfl: 11  •  doxycycline (VIBRAMYCIN) 100 MG capsule, Take 1 capsule by mouth 2 (Two) Times a Day., Disp: 20 capsule, Rfl: 0  •  famotidine (PEPCID) 20 MG tablet, Take 20 mg by mouth 2 (Two) Times a Day., Disp: , Rfl:   •  fexofenadine (ALLEGRA) 180 MG tablet, Take 180 mg by mouth Daily., Disp: , Rfl:   •  fluticasone (FLONASE) 50 MCG/ACT nasal spray, Instill 2 sprays into each nostril daily., Disp: 16 g, Rfl: 11  •  fluticasone-salmeterol (Advair HFA) 230-21 MCG/ACT inhaler, Inhale 2 puffs by mouth 2 (Two) Times a Day., Disp: 12 g, Rfl: 11  •  furosemide (LASIX) 40 MG tablet, Take 1 tablet by mouth Daily., Disp: 90 tablet, Rfl: 3  •  ipratropium (ATROVENT) 0.06 % nasal spray, Spray 2 sprays into the nostril(s) every night at bedtime as directed by provider, Disp: 15 mL, Rfl: 12  •  ipratropium-albuterol (DUO-NEB) 0.5-2.5 mg/3 ml nebulizer, Inhale contents of 1 vial (3mL) via nebulizer every 4-6 hours as needed for coughing, wheezing, or shortness of breath., Disp: 300 mL, Rfl: 3  •  ipratropium-albuterol (DUO-NEB) 0.5-2.5 mg/3 ml nebulizer, Inhale 1 vial (3 mL) by nebulization every 4-6 hours as needed for cough, wheezing, and shortness of breath, Disp: 270 mL, Rfl: 3  •  levothyroxine (SYNTHROID, LEVOTHROID) 50 MCG tablet, Take 1 tablet by mouth Daily., Disp: 90 tablet, Rfl: 3  •   Magnesium 250 MG tablet, Take 500 mg by mouth 2 (Two) Times a Day., Disp: , Rfl:   •  melatonin 5 MG tablet tablet, Take 5 mg by mouth Every Night., Disp: , Rfl:   •  montelukast (SINGULAIR) 10 MG tablet, Take 1 tablet by mouth every night at bedtime., Disp: 30 tablet, Rfl: 11  •  multivitamin with minerals (ONE-A-DAY PROACTIVE 65+ PO), Take 1 tablet by mouth Daily., Disp: , Rfl:   •  omeprazole (priLOSEC) 40 MG capsule, Take 1 capsule by mouth 2 (two) times a day., Disp: 180 capsule, Rfl: 3  •  ondansetron (ZOFRAN) 4 MG tablet, Take 1 tablet by mouth Every 8 (Eight) Hours As Needed for Nausea or Vomiting., Disp: 30 tablet, Rfl: 11  •  potassium citrate (UROCIT-K) 10 MEQ (1080 MG) CR tablet, Take 1 tablet by mouth Daily., Disp: 90 tablet, Rfl: 3  •  predniSONE (DELTASONE) 20 MG tablet, With Gamma globulin infusion -monthly, Disp: , Rfl:   •  promethazine-dextromethorphan (PROMETHAZINE-DM) 6.25-15 MG/5ML syrup, Take 5 mL by mouth 4 (Four) Times a Day As Needed for Cough., Disp: 240 mL, Rfl: 0  •  Semaglutide (Rybelsus) 14 MG tablet, Take 1 tablet by mouth Daily., Disp: 30 tablet, Rfl: 11  •  sodium-potassium-magnesium sulfates (Suprep Bowel Prep Kit) 17.5-3.13-1.6 GM/177ML solution oral solution, Take 1 bottle by mouth Every 12 (Twelve) Hours., Disp: 354 mL, Rfl: 0  •  Spacer/Aero-Holding Chambers (AeroChamber Plus Wali-Vu) misc, Use as directed with albuterol inhaler, Disp: 1 each, Rfl: 0  •  sucralfate (Carafate) 1 g tablet, Take 1 tablet by mouth 4 (Four) Times a Day. (Patient taking differently: Take 1 g by mouth 4 (Four) Times a Day As Needed.), Disp: 120 tablet, Rfl: 1  •  tiotropium bromide monohydrate (Spiriva Respimat) 2.5 MCG/ACT aerosol solution inhaler, Inhale 2 puffs by mouth daily, Disp: 4 g, Rfl: 11  •  tiZANidine (ZANAFLEX) 4 MG tablet, Take 1 tablet by mouth Every Night., Disp: 90 tablet, Rfl: 1  •  ubrogepant (ubrogepant) 50 MG tablet, Take 1 tablet by mouth 1 (One) Time As Needed for headache for up  "to 1 dose per day, Disp: 10 tablet, Rfl: 11  •  valsartan (DIOVAN) 160 MG tablet, Take 1 tablet by mouth Daily., Disp: 90 tablet, Rfl: 3  •  vitamin B-12 (CYANOCOBALAMIN) 1000 MCG tablet, Take 1,000 mcg by mouth Daily., Disp: , Rfl:   •  isosorbide mononitrate (IMDUR) 60 MG 24 hr tablet, Take 1 tablet by mouth Every Morning., Disp: 90 tablet, Rfl: 3    Current Facility-Administered Medications:   •  heparin injection 500 Units, 5 mL, Intravenous, PRN, Sanjeev Rawls MD  •  sodium chloride 0.9 % flush 10 mL, 10 mL, Intravenous, Q12H, Sanjeev Rawls MD  •  sodium chloride 0.9 % flush 10 mL, 10 mL, Intravenous, PRN, Sanjeev Rawls MD  •  sodium chloride 0.9 % flush 20 mL, 20 mL, Intravenous, PRSinai VANG Michael A, MD    Facility-Administered Medications Ordered in Other Visits:   •  acetaminophen (TYLENOL) tablet 325 mg, 325 mg, Oral, Once, Chacha Garza MD  •  diphenhydrAMINE (BENADRYL) capsule 50 mg, 50 mg, Oral, Once, Chacha Garza MD  •  heparin injection 500 Units, 500 Units, Intravenous, PRN, Chacha Garza MD, 500 Units at 02/14/22 1426  •  sodium chloride 0.9 % bolus 500 mL, 500 mL, Intravenous, Q28 Days, Chacha Garza MD, Stopped at 02/14/22 0905  •  sodium chloride 0.9 % flush 10 mL, 10 mL, Intravenous, PRN, Chacha Garza MD, 10 mL at 02/14/22 1426    Physical Exam:  Vital Signs:   Vitals:    02/15/22 1119   BP: 136/92   BP Location: Left arm   Patient Position: Sitting   Pulse: 89   Resp: 18   SpO2: 99%   Weight: 106 kg (233 lb)   Height: 162.6 cm (64\")       Physical Exam  Constitutional:       General: She is not in acute distress.     Appearance: She is well-developed. She is obese. She is not diaphoretic.   HENT:      Head: Normocephalic and atraumatic.   Eyes:      General: No scleral icterus.     Pupils: Pupils are equal, round, and reactive to light.   Neck:      Trachea: No tracheal deviation.   Cardiovascular:      Rate and Rhythm: Normal rate and regular " rhythm.      Heart sounds: Normal heart sounds. No friction rub. No gallop.       Comments: 5/6 ejection murmur over the aortic area  Pulmonary:      Effort: Pulmonary effort is normal. No respiratory distress.      Breath sounds: Normal breath sounds. No stridor. No wheezing or rales.   Chest:      Chest wall: No tenderness.   Abdominal:      General: Bowel sounds are normal. There is no distension.      Palpations: Abdomen is soft.      Tenderness: There is no abdominal tenderness. There is no guarding or rebound.   Musculoskeletal:         General: No swelling. Normal range of motion.      Cervical back: Neck supple. No tenderness.   Lymphadenopathy:      Cervical: No cervical adenopathy.   Skin:     General: Skin is warm and dry.      Findings: No erythema.   Neurological:      General: No focal deficit present.      Mental Status: She is alert and oriented to person, place, and time.   Psychiatric:         Mood and Affect: Mood normal.         Behavior: Behavior normal.         Results Review:   I reviewed the patient's new clinical results.        Assessment / Plan:     1. Aortic stenosis  --Mild calcific aortic stenosis per echocardiogram in 12/20  --Appears to have had progression in the intensity of her murmur since last follow-up  --Remains asymptomatic  --Repeat echocardiogram to reassess aortic stenosis  --Follow-up in 6 months, sooner pending results of repeat echocardiogram     2. Chronic diastolic heart failure  --Reported history of chronic diastolic heart failure in the setting of underlying CKD  --Lasix previously discontinued, euvolemic on exam today     3.  Prinzmetal angina   --Remains chest pain-free  --Will change isosorbide dinitrate to mononitrate     4. Common variable immunodeficiency   --Managed by immunology     5.  Chronic kidney disease, stage III  --Last labs yesterday showed GFR stable at 48    6.  Obesity, BMI 39 kg/m²  --Encouraged continued efforts at weight loss through diet and  exercise      Follow Up:   Return in about 6 months (around 8/15/2022).      Thank you for allowing me to participate in the care of your patient. Please to not hesitate to contact me with additional questions or concerns.     SENTHIL Lovelace MD  Interventional Cardiology   02/15/2022  11:19 EST

## 2022-12-28 RX ORDER — AZITHROMYCIN 250 MG/1
TABLET, FILM COATED ORAL
Qty: 6 TABLET | Refills: 0 | Status: SHIPPED | OUTPATIENT
Start: 2022-12-28 | End: 2023-03-13

## 2022-12-29 ENCOUNTER — HOSPITAL ENCOUNTER (OUTPATIENT)
Dept: INFUSION THERAPY | Facility: HOSPITAL | Age: 67
Setting detail: INFUSION SERIES
Discharge: HOME | End: 2022-12-29

## 2022-12-29 VITALS
HEART RATE: 70 BPM | OXYGEN SATURATION: 97 % | TEMPERATURE: 98.2 F | RESPIRATION RATE: 18 BRPM | DIASTOLIC BLOOD PRESSURE: 89 MMHG | SYSTOLIC BLOOD PRESSURE: 147 MMHG

## 2022-12-29 DIAGNOSIS — D80.1 COMMON VARIABLE AGAMMAGLOBULINEMIA: Primary | ICD-10-CM

## 2022-12-29 DIAGNOSIS — Z95.828 PORT-A-CATH IN PLACE: ICD-10-CM

## 2022-12-29 PROCEDURE — 25010000002 HEPARIN LOCK FLUSH PER 10 UNITS: Performed by: ALLERGY & IMMUNOLOGY

## 2022-12-29 PROCEDURE — 25010000002 IMMUNE GLOBULIN (HUMAN) 20 GM/200ML SOLUTION: Performed by: ALLERGY & IMMUNOLOGY

## 2022-12-29 PROCEDURE — 96366 THER/PROPH/DIAG IV INF ADDON: CPT

## 2022-12-29 PROCEDURE — 96365 THER/PROPH/DIAG IV INF INIT: CPT

## 2022-12-29 RX ORDER — ACETAMINOPHEN 325 MG/1
325 TABLET ORAL ONCE
Status: DISCONTINUED | OUTPATIENT
Start: 2022-12-29 | End: 2022-12-31 | Stop reason: HOSPADM

## 2022-12-29 RX ORDER — PREDNISONE 20 MG/1
40 TABLET ORAL ONCE
Status: DISCONTINUED | OUTPATIENT
Start: 2022-12-29 | End: 2022-12-31 | Stop reason: HOSPADM

## 2022-12-29 RX ORDER — DIPHENHYDRAMINE HCL 25 MG
50 CAPSULE ORAL ONCE
Status: DISCONTINUED | OUTPATIENT
Start: 2022-12-29 | End: 2022-12-31 | Stop reason: HOSPADM

## 2022-12-29 RX ORDER — HEPARIN SODIUM (PORCINE) LOCK FLUSH IV SOLN 100 UNIT/ML 100 UNIT/ML
500 SOLUTION INTRAVENOUS AS NEEDED
Status: DISCONTINUED | OUTPATIENT
Start: 2022-12-29 | End: 2022-12-31 | Stop reason: HOSPADM

## 2022-12-29 RX ORDER — ACETAMINOPHEN 325 MG/1
325 TABLET ORAL ONCE
Status: CANCELLED | OUTPATIENT
Start: 2023-01-26

## 2022-12-29 RX ORDER — HEPARIN SODIUM (PORCINE) LOCK FLUSH IV SOLN 100 UNIT/ML 100 UNIT/ML
500 SOLUTION INTRAVENOUS AS NEEDED
Status: CANCELLED | OUTPATIENT
Start: 2022-12-29

## 2022-12-29 RX ORDER — DIPHENHYDRAMINE HCL 25 MG
50 CAPSULE ORAL ONCE
Status: CANCELLED
Start: 2023-01-26 | End: 2023-01-26

## 2022-12-29 RX ORDER — DIPHENHYDRAMINE HCL 25 MG
50 CAPSULE ORAL ONCE AS NEEDED
Status: CANCELLED
Start: 2023-01-26

## 2022-12-29 RX ORDER — EPINEPHRINE 1 MG/ML
0.3 INJECTION, SOLUTION INTRAMUSCULAR; SUBCUTANEOUS ONCE AS NEEDED
Status: CANCELLED
Start: 2023-01-26

## 2022-12-29 RX ORDER — SODIUM CHLORIDE 0.9 % (FLUSH) 0.9 %
10 SYRINGE (ML) INJECTION AS NEEDED
Status: CANCELLED | OUTPATIENT
Start: 2022-12-29

## 2022-12-29 RX ORDER — METHYLPREDNISOLONE SODIUM SUCCINATE 125 MG/2ML
50 INJECTION, POWDER, LYOPHILIZED, FOR SOLUTION INTRAMUSCULAR; INTRAVENOUS ONCE AS NEEDED
Status: CANCELLED
Start: 2023-01-26

## 2022-12-29 RX ORDER — PREDNISONE 20 MG/1
40 TABLET ORAL ONCE
Status: CANCELLED
Start: 2023-01-26 | End: 2023-01-26

## 2022-12-29 RX ORDER — PREDNISONE 20 MG/1
40 TABLET ORAL ONCE AS NEEDED
Status: CANCELLED
Start: 2023-01-26

## 2022-12-29 RX ORDER — SODIUM CHLORIDE 0.9 % (FLUSH) 0.9 %
10 SYRINGE (ML) INJECTION AS NEEDED
Status: DISCONTINUED | OUTPATIENT
Start: 2022-12-29 | End: 2022-12-31 | Stop reason: HOSPADM

## 2022-12-29 RX ADMIN — HEPARIN 500 UNITS: 100 SYRINGE at 12:49

## 2022-12-29 RX ADMIN — IMMUNE GLOBULIN INFUSION (HUMAN) 20 G: 100 INJECTION, SOLUTION INTRAVENOUS; SUBCUTANEOUS at 09:20

## 2022-12-29 RX ADMIN — Medication 10 ML: at 12:49

## 2022-12-29 RX ADMIN — SODIUM CHLORIDE 500 ML: 9 INJECTION, SOLUTION INTRAVENOUS at 08:39

## 2022-12-29 RX ADMIN — IMMUNE GLOBULIN INFUSION (HUMAN) 20 G: 100 INJECTION, SOLUTION INTRAVENOUS; SUBCUTANEOUS at 11:10

## 2023-01-02 DIAGNOSIS — F41.9 ANXIETY: ICD-10-CM

## 2023-01-02 RX ORDER — CLONAZEPAM 0.5 MG/1
0.5 TABLET ORAL NIGHTLY PRN
Qty: 30 TABLET | Refills: 2 | Status: CANCELLED | OUTPATIENT
Start: 2023-01-02

## 2023-01-03 ENCOUNTER — PATIENT MESSAGE (OUTPATIENT)
Dept: INTERNAL MEDICINE | Facility: CLINIC | Age: 68
End: 2023-01-03
Payer: MEDICARE

## 2023-01-03 DIAGNOSIS — F41.9 ANXIETY: ICD-10-CM

## 2023-01-03 NOTE — TELEPHONE ENCOUNTER
From: Raquel Mariee  To: Sanjeev Rawls  Sent: 1/3/2023 8:50 AM EST  Subject: Clonazepam     Please refill clonazepam. Thank you Lilian

## 2023-01-05 ENCOUNTER — TRANSCRIBE ORDERS (OUTPATIENT)
Dept: ADMINISTRATIVE | Facility: HOSPITAL | Age: 68
End: 2023-01-05
Payer: MEDICARE

## 2023-01-05 DIAGNOSIS — Z12.31 VISIT FOR SCREENING MAMMOGRAM: Primary | ICD-10-CM

## 2023-01-05 RX ORDER — CLONAZEPAM 0.5 MG/1
0.5 TABLET ORAL NIGHTLY PRN
Qty: 30 TABLET | Refills: 2 | Status: SHIPPED | OUTPATIENT
Start: 2023-01-05 | End: 2023-03-29 | Stop reason: SDUPTHER

## 2023-01-16 DIAGNOSIS — E11.9 TYPE 2 DIABETES MELLITUS WITHOUT COMPLICATION, WITH LONG-TERM CURRENT USE OF INSULIN: ICD-10-CM

## 2023-01-16 DIAGNOSIS — Z79.4 TYPE 2 DIABETES MELLITUS WITHOUT COMPLICATION, WITH LONG-TERM CURRENT USE OF INSULIN: ICD-10-CM

## 2023-01-16 RX ORDER — TIZANIDINE 4 MG/1
4 TABLET ORAL NIGHTLY
Qty: 90 TABLET | Refills: 1 | Status: SHIPPED | OUTPATIENT
Start: 2023-01-16 | End: 2023-01-18 | Stop reason: SDUPTHER

## 2023-01-17 ENCOUNTER — PATIENT MESSAGE (OUTPATIENT)
Dept: INTERNAL MEDICINE | Facility: CLINIC | Age: 68
End: 2023-01-17
Payer: MEDICARE

## 2023-01-17 DIAGNOSIS — E11.9 TYPE 2 DIABETES MELLITUS WITHOUT COMPLICATION, WITH LONG-TERM CURRENT USE OF INSULIN: ICD-10-CM

## 2023-01-17 DIAGNOSIS — Z79.4 TYPE 2 DIABETES MELLITUS WITHOUT COMPLICATION, WITH LONG-TERM CURRENT USE OF INSULIN: ICD-10-CM

## 2023-01-18 ENCOUNTER — TELEPHONE (OUTPATIENT)
Dept: INTERNAL MEDICINE | Facility: CLINIC | Age: 68
End: 2023-01-18
Payer: MEDICARE

## 2023-01-18 NOTE — TELEPHONE ENCOUNTER
From: Raquel Mariee  To: Sanjeev Rawls  Sent: 1/17/2023 6:09 PM EST  Subject: Tizanidine    I. Need refills on tizanidine please. I still take an extra 2 mg. With the Ubrelvy when I get a h/a and 2 mg. 2x on day of infusion.thank you Lilian.

## 2023-01-18 NOTE — TELEPHONE ENCOUNTER
Rx Refill Note  Requested Prescriptions     Signed Prescriptions Disp Refills   • ubrogepant 100 MG tablet 4 tablet 0     Sig: Take 1 tablet by mouth 1 (One) Time As Needed (migraine) for up to 1 dose.     Authorizing Provider: DEBI STANLEY     Ordering User: MARIANGEL RAMIREZ      Last office visit with prescribing clinician: 10/21/2022   Last telemedicine visit with prescribing clinician: 3/16/2023   Next office visit with prescribing clinician: 3/16/2023   Samples    Mariangel Ramirez MA  01/18/23, 15:06 EST

## 2023-01-19 RX ORDER — TIZANIDINE 4 MG/1
4 TABLET ORAL NIGHTLY
Qty: 90 TABLET | Refills: 1 | Status: ON HOLD | OUTPATIENT
Start: 2023-01-19

## 2023-01-25 ENCOUNTER — OFFICE VISIT (OUTPATIENT)
Dept: INTERNAL MEDICINE | Facility: CLINIC | Age: 68
End: 2023-01-25
Payer: MEDICARE

## 2023-01-25 VITALS
BODY MASS INDEX: 36.54 KG/M2 | RESPIRATION RATE: 16 BRPM | HEIGHT: 64 IN | DIASTOLIC BLOOD PRESSURE: 72 MMHG | TEMPERATURE: 98.6 F | SYSTOLIC BLOOD PRESSURE: 128 MMHG | HEART RATE: 76 BPM | WEIGHT: 214 LBS | OXYGEN SATURATION: 95 %

## 2023-01-25 DIAGNOSIS — S76.012A PSOAS MUSCLE STRAIN, LEFT, INITIAL ENCOUNTER: ICD-10-CM

## 2023-01-25 DIAGNOSIS — G47.33 OSA (OBSTRUCTIVE SLEEP APNEA): ICD-10-CM

## 2023-01-25 DIAGNOSIS — R32 URINARY INCONTINENCE, UNSPECIFIED TYPE: ICD-10-CM

## 2023-01-25 DIAGNOSIS — M70.62 TROCHANTERIC BURSITIS OF LEFT HIP: Primary | ICD-10-CM

## 2023-01-25 PROCEDURE — 99214 OFFICE O/P EST MOD 30 MIN: CPT | Performed by: INTERNAL MEDICINE

## 2023-01-25 RX ORDER — ORAL SEMAGLUTIDE 7 MG/1
7 TABLET ORAL DAILY
Qty: 90 TABLET | Refills: 3 | Status: ON HOLD | OUTPATIENT
Start: 2023-01-25

## 2023-01-25 RX ORDER — IPRATROPIUM BROMIDE 42 UG/1
SPRAY, METERED NASAL
Qty: 15 ML | Refills: 3 | Status: ON HOLD | OUTPATIENT
Start: 2023-01-25

## 2023-01-25 NOTE — PROGRESS NOTES
Subjective     Patient ID: Raquel Mariee is a 67 y.o. female. Patient is here for management of multiple medical problems.     Chief Complaint   Patient presents with   • Hip Pain     Left hip   • Groin Pain     Left    • Leg Pain     Left     History of Present Illness         Left hip and groin pain.      The following portions of the patient's history were reviewed and updated as appropriate: allergies, current medications, past family history, past medical history, past social history, past surgical history and problem list.    Review of Systems    Current Outpatient Medications:   •  acetaminophen-codeine (TYLENOL #3) 300-30 MG per tablet, Take 1 tablet by mouth Every 4 (Four) Hours As Needed for Moderate Pain., Disp: 10 tablet, Rfl: 0  •  albuterol sulfate  (90 Base) MCG/ACT inhaler, Inhale 2 puffs by mouth every 4-6 hours as needed for cough, wheeze, shortness of breath. Use with vortex spacer, Disp: 8.5 g, Rfl: 3  •  albuterol sulfate  (90 Base) MCG/ACT inhaler, Inhale 2 puffs by mouth every 4 to 6 hours as needed for cough, wheeze, shortness of breath. Use with vortex spacer, Disp: 8.5 g, Rfl: 3  •  allopurinol (Zyloprim) 100 MG tablet, Take 1 tablet by mouth Daily., Disp: 90 tablet, Rfl: 3  •  azithromycin (Zithromax) 250 MG tablet, Take 2 tablets by mouth the first day, then 1 tablet daily for 4 days., Disp: 6 tablet, Rfl: 0  •  betamethasone valerate (VALISONE) 0.1 % cream, Apply topically to the appropriate area as directed Daily., Disp: 45 g, Rfl: 11  •  Budeson-Glycopyrrol-Formoterol (Breztri Aerosphere) 160-9-4.8 MCG/ACT aerosol inhaler, Inhale 2 puffs by mouth twice daily. Rinse mouth after use, Disp: 10.7 g, Rfl: 11  •  buPROPion SR (WELLBUTRIN SR) 150 MG 12 hr tablet, Take 1 tablet by mouth 2 (Two) Times a Day., Disp: 180 tablet, Rfl: 3  •  calcium carbonate (Antacid Maximum) 1000 MG chewable tablet, Chew 500 mg 3 (Three) Times a Day., Disp: , Rfl:   •  cholecalciferol (VITAMIN  D3) 25 MCG (1000 UT) tablet, Take 1,000 Units by mouth Daily., Disp: , Rfl:   •  clonazePAM (KlonoPIN) 0.5 MG tablet, Take 1 tablet by mouth At Night As Needed, Disp: 30 tablet, Rfl: 2  •  dicyclomine (BENTYL) 10 MG capsule, Take 1 capsule by mouth 3 (Three) Times a Day., Disp: 270 capsule, Rfl: 3  •  diphenhydrAMINE (Benadryl Allergy) 25 MG tablet, Take 1-2 tablets by mouth Every 4-6 Hours As Needed., Disp: 90 tablet, Rfl: 11  •  ferrous sulfate 325 (65 FE) MG EC tablet, Take 325 mg by mouth., Disp: , Rfl:   •  fexofenadine (ALLEGRA) 180 MG tablet, Take 180 mg by mouth Daily., Disp: , Rfl:   •  fluticasone (FLONASE) 50 MCG/ACT nasal spray, Instill 2 sprays into each nostril daily. (Patient taking differently: 2 sprays by Each Nare route 2 (Two) Times a Day.), Disp: 16 g, Rfl: 11  •  furosemide (LASIX) 40 MG tablet, Take 1 tablet by mouth Daily. Two days a month., Disp: 30 tablet, Rfl: 1  •  glucosamine-chondroitin 500-400 MG capsule capsule, Take 1 capsule by mouth., Disp: , Rfl:   •  HYDROcod Polst-CPM Polst ER (Tussionex Pennkinetic ER) 10-8 MG/5ML ER suspension, Take 1 teaspoonful (5mL) by mouth Every 12 (Twelve) Hours As Needed for Cough., Disp: 60 mL, Rfl: 0  •  Hypertonic Nasal Wash (Sinus Rinse Kit) pack, use once or twice daily as needed, Disp: 1 each, Rfl: 3  •  immune globulin, human, (Gammagard) 20 GM/200ML solution infusion, Infuse 40 g into a venous catheter Every 28 (Twenty-Eight) Days., Disp: 400 mL, Rfl: 0  •  ipratropium (ATROVENT) 0.06 % nasal spray, Instill 1-2 sprays each nostril 2-3 times daily as needed, Disp: 15 mL, Rfl: 3  •  ipratropium-albuterol (DUO-NEB) 0.5-2.5 mg/3 ml nebulizer, Inhale contents of 1 vial (3mL) via nebulizer every 4-6 hours as needed for coughing, wheezing, or shortness of breath., Disp: 270 mL, Rfl: 3  •  ipratropium-albuterol (DUO-NEB) 0.5-2.5 mg/3 ml nebulizer, Inhale the contents of 1 vial into nebulizer every 4-6 hours as needed for cough,wheezing and shortness of  breath., Disp: 90 mL, Rfl: 3  •  isosorbide mononitrate (IMDUR) 30 MG 24 hr tablet, Take 1 tablet by mouth Every Morning., Disp: 90 tablet, Rfl: 3  •  levothyroxine (SYNTHROID, LEVOTHROID) 50 MCG tablet, Take 1 tablet by mouth Daily., Disp: 90 tablet, Rfl: 3  •  Magnesium 250 MG tablet, Take 500 mg by mouth 2 (Two) Times a Day., Disp: , Rfl:   •  melatonin 5 MG tablet tablet, Take 10 mg by mouth Every Night., Disp: , Rfl:   •  montelukast (SINGULAIR) 10 MG tablet, Take 1 tablet by mouth every night at bedtime., Disp: 30 tablet, Rfl: 11  •  montelukast (SINGULAIR) 10 MG tablet, Take 1 tablet by mouth every night at bedtime., Disp: 30 tablet, Rfl: 11  •  multivitamin with minerals tablet tablet, Take 1 tablet by mouth Daily., Disp: , Rfl:   •  omeprazole (priLOSEC) 40 MG capsule, Take 1 capsule by mouth 2 (Two) Times a Day., Disp: 180 capsule, Rfl: 3  •  ondansetron (ZOFRAN) 4 MG tablet, Take 1 tablet by mouth Every 8 (Eight) Hours As Needed for Nausea or Vomiting., Disp: 30 tablet, Rfl: 11  •  POTASSIUM CHLORIDE PO, Take 20 mEq by mouth See Admin Instructions. Takes two times a month with Lasix, Disp: , Rfl:   •  potassium citrate (UROCIT-K) 10 MEQ (1080 MG) CR tablet, Take 1 tablet by mouth Daily., Disp: 90 tablet, Rfl: 3  •  predniSONE (DELTASONE) 20 MG tablet, Take 2 tablets prior to Gammgard infusion, Disp: 6 tablet, Rfl: 3  •  predniSONE (DELTASONE) 20 MG tablet, Take 2 tablets by mouth prior to Gammgard infusion, Disp: 6 tablet, Rfl: 3  •  predniSONE (DELTASONE) 20 MG tablet, Take 2 tablets prior to Gammgard infusion, Disp: 6 tablet, Rfl: 3  •  Spacer/Aero-Holding Chambers (AeroChamber Plus Wali-Vu) misc, Use as directed with albuterol inhaler, Disp: 1 each, Rfl: 0  •  Spacer/Aero-Holding Chambers (AeroChamber Plus Wali-Vu) misc, Use as directed with inhaler, Disp: 1 each, Rfl: 0  •  tiZANidine (ZANAFLEX) 4 MG tablet, Take 1 tablet by mouth Every Night., Disp: 90 tablet, Rfl: 1  •  ubrogepant 100 MG tablet, Take  "1 tablet by mouth 1 (One) Time As Needed (migraine) for up to 1 dose., Disp: 4 tablet, Rfl: 0  •  valsartan (DIOVAN) 160 MG tablet, Take 1 tablet by mouth Daily., Disp: 90 tablet, Rfl: 3  •  vitamin B-12 (CYANOCOBALAMIN) 1000 MCG tablet, Take 1,000 mcg by mouth Daily., Disp: , Rfl:   •  Semaglutide (Rybelsus) 7 MG tablet, Take 7 mg by mouth Daily., Disp: 90 tablet, Rfl: 3    Current Facility-Administered Medications:   •  heparin injection 500 Units, 5 mL, Intravenous, PRN, Sanjeev Rawls MD  •  sodium chloride 0.9 % flush 10 mL, 10 mL, Intravenous, Q12H, Sanjeev Rawls MD  •  sodium chloride 0.9 % flush 10 mL, 10 mL, Intravenous, PRN, Sanjeev Rawls MD  •  sodium chloride 0.9 % flush 20 mL, 20 mL, Intravenous, PRN, Sanjeev Rawls MD    Objective      Blood pressure 128/72, pulse 76, temperature 98.6 °F (37 °C), resp. rate 16, height 162.6 cm (64.02\"), weight 97.1 kg (214 lb), SpO2 95 %, not currently breastfeeding.    Physical Exam     General Appearance:    Alert, cooperative, no distress, appears stated age   Head:    Normocephalic, without obvious abnormality, atraumatic   Eyes:    PERRL, conjunctiva/corneas clear, EOM's intact   Ears:    Normal TM's and external ear canals, both ears   Nose:   Nares normal, septum midline, mucosa normal, no drainage   or sinus tenderness   Throat:   Lips, mucosa, and tongue normal; teeth and gums normal   Neck:   Supple, symmetrical, trachea midline, no adenopathy;        thyroid:  No enlargement/tenderness/nodules; no carotid    bruit or JVD   Back:     Symmetric, no curvature, ROM normal, no CVA tenderness   Lungs:     Clear to auscultation bilaterally, respirations unlabored   Chest wall:    No tenderness or deformity   Heart:    Regular rate and rhythm, S1 and S2 normal, lowud diastolic murmur,        rub or gallop   Abdomen:     Soft, non-tender, bowel sounds active all four quadrants,     no masses, no organomegaly   Extremities:   ttp anterio hip and psoas " area. ttp troch left.   Pulses:   2+ and symmetric all extremities   Skin:   Skin color, texture, turgor normal, no rashes or lesions   Lymph nodes:   Cervical, supraclavicular, and axillary nodes normal   Neurologic:   CNII-XII intact. Normal strength, sensation and reflexes       throughout      Results for orders placed or performed in visit on 10/07/22   Hemoglobin A1c    Specimen: Blood   Result Value Ref Range    Hemoglobin A1C 6.10 (H) 4.80 - 5.60 %         Assessment & Plan   Groin and hip pain. Worse on standing.       streching troch denmonstreated.    Start diclofenac qid for troch burstis.     Will send to [facility] for implant. Not on cpap dificulty with want to do cpap.    Wants rybelsus. On protein diet. Not loosing wt. Was on 7 then 14 and started with nausea and quite due to insurance.        Diagnoses and all orders for this visit:    1. Trochanteric bursitis of left hip (Primary)    2. Psoas muscle strain, left, initial encounter    3. Urinary incontinence, unspecified type  -     Ambulatory Referral to Urology    4. KALYN (obstructive sleep apnea)  -     Ambulatory Referral to ENT (Otolaryngology)    Other orders  -     Semaglutide (Rybelsus) 7 MG tablet; Take 7 mg by mouth Daily.  Dispense: 90 tablet; Refill: 3  -     ipratropium (ATROVENT) 0.06 % nasal spray; Instill 1-2 sprays each nostril 2-3 times daily as needed  Dispense: 15 mL; Refill: 3      No follow-ups on file.          There are no Patient Instructions on file for this visit.     Sanjeev Rawls MD    Assessment & Plan

## 2023-01-26 ENCOUNTER — HOSPITAL ENCOUNTER (OUTPATIENT)
Dept: INFUSION THERAPY | Facility: HOSPITAL | Age: 68
Setting detail: INFUSION SERIES
Discharge: HOME | End: 2023-01-26
Payer: MEDICARE

## 2023-01-26 VITALS
TEMPERATURE: 98 F | OXYGEN SATURATION: 96 % | HEART RATE: 75 BPM | DIASTOLIC BLOOD PRESSURE: 73 MMHG | SYSTOLIC BLOOD PRESSURE: 145 MMHG | RESPIRATION RATE: 18 BRPM

## 2023-01-26 DIAGNOSIS — Z95.828 PORT-A-CATH IN PLACE: ICD-10-CM

## 2023-01-26 DIAGNOSIS — D80.1 COMMON VARIABLE AGAMMAGLOBULINEMIA: Primary | ICD-10-CM

## 2023-01-26 PROCEDURE — 96366 THER/PROPH/DIAG IV INF ADDON: CPT

## 2023-01-26 PROCEDURE — 96361 HYDRATE IV INFUSION ADD-ON: CPT

## 2023-01-26 PROCEDURE — 96365 THER/PROPH/DIAG IV INF INIT: CPT

## 2023-01-26 PROCEDURE — 25010000002 HEPARIN LOCK FLUSH PER 10 UNITS: Performed by: ALLERGY & IMMUNOLOGY

## 2023-01-26 PROCEDURE — 25010000002 IMMUNE GLOBULIN (HUMAN) 20 GM/200ML SOLUTION: Performed by: ALLERGY & IMMUNOLOGY

## 2023-01-26 RX ORDER — PREDNISONE 20 MG/1
40 TABLET ORAL ONCE
Status: DISCONTINUED | OUTPATIENT
Start: 2023-01-26 | End: 2023-01-28 | Stop reason: HOSPADM

## 2023-01-26 RX ORDER — PREDNISONE 20 MG/1
40 TABLET ORAL ONCE AS NEEDED
Status: DISCONTINUED | OUTPATIENT
Start: 2023-01-26 | End: 2023-01-28 | Stop reason: HOSPADM

## 2023-01-26 RX ORDER — DIPHENHYDRAMINE HCL 25 MG
50 CAPSULE ORAL ONCE AS NEEDED
Status: DISCONTINUED | OUTPATIENT
Start: 2023-01-26 | End: 2023-01-28 | Stop reason: HOSPADM

## 2023-01-26 RX ORDER — EPINEPHRINE 1 MG/ML
0.3 INJECTION, SOLUTION INTRAMUSCULAR; SUBCUTANEOUS ONCE AS NEEDED
Status: DISCONTINUED | OUTPATIENT
Start: 2023-01-26 | End: 2023-01-28 | Stop reason: HOSPADM

## 2023-01-26 RX ORDER — HEPARIN SODIUM (PORCINE) LOCK FLUSH IV SOLN 100 UNIT/ML 100 UNIT/ML
500 SOLUTION INTRAVENOUS AS NEEDED
Status: DISCONTINUED | OUTPATIENT
Start: 2023-01-26 | End: 2023-01-28 | Stop reason: HOSPADM

## 2023-01-26 RX ORDER — METHYLPREDNISOLONE SODIUM SUCCINATE 125 MG/2ML
50 INJECTION, POWDER, LYOPHILIZED, FOR SOLUTION INTRAMUSCULAR; INTRAVENOUS ONCE AS NEEDED
Status: CANCELLED
Start: 2023-02-23

## 2023-01-26 RX ORDER — SODIUM CHLORIDE 0.9 % (FLUSH) 0.9 %
10 SYRINGE (ML) INJECTION AS NEEDED
Status: DISCONTINUED | OUTPATIENT
Start: 2023-01-26 | End: 2023-01-28 | Stop reason: HOSPADM

## 2023-01-26 RX ORDER — ACETAMINOPHEN 325 MG/1
325 TABLET ORAL ONCE
Status: DISCONTINUED | OUTPATIENT
Start: 2023-01-26 | End: 2023-01-28 | Stop reason: HOSPADM

## 2023-01-26 RX ORDER — DIPHENHYDRAMINE HCL 25 MG
50 CAPSULE ORAL ONCE
Status: CANCELLED
Start: 2023-02-23 | End: 2023-02-23

## 2023-01-26 RX ORDER — PREDNISONE 20 MG/1
40 TABLET ORAL ONCE AS NEEDED
Status: CANCELLED
Start: 2023-02-23

## 2023-01-26 RX ORDER — METHYLPREDNISOLONE SODIUM SUCCINATE 125 MG/2ML
50 INJECTION, POWDER, LYOPHILIZED, FOR SOLUTION INTRAMUSCULAR; INTRAVENOUS ONCE AS NEEDED
Status: DISCONTINUED | OUTPATIENT
Start: 2023-01-26 | End: 2023-01-28 | Stop reason: HOSPADM

## 2023-01-26 RX ORDER — DIPHENHYDRAMINE HCL 25 MG
50 CAPSULE ORAL ONCE AS NEEDED
Status: CANCELLED
Start: 2023-02-23

## 2023-01-26 RX ORDER — DIPHENHYDRAMINE HCL 25 MG
50 CAPSULE ORAL ONCE
Status: DISCONTINUED | OUTPATIENT
Start: 2023-01-26 | End: 2023-01-28 | Stop reason: HOSPADM

## 2023-01-26 RX ORDER — ACETAMINOPHEN 325 MG/1
325 TABLET ORAL ONCE
Status: CANCELLED | OUTPATIENT
Start: 2023-02-23

## 2023-01-26 RX ORDER — PREDNISONE 20 MG/1
40 TABLET ORAL ONCE
Status: CANCELLED
Start: 2023-02-23 | End: 2023-02-23

## 2023-01-26 RX ORDER — EPINEPHRINE 1 MG/ML
0.3 INJECTION, SOLUTION INTRAMUSCULAR; SUBCUTANEOUS ONCE AS NEEDED
Status: CANCELLED
Start: 2023-02-23

## 2023-01-26 RX ADMIN — IMMUNE GLOBULIN INFUSION (HUMAN) 20 G: 100 INJECTION, SOLUTION INTRAVENOUS; SUBCUTANEOUS at 11:25

## 2023-01-26 RX ADMIN — HEPARIN 500 UNITS: 100 SYRINGE at 13:00

## 2023-01-26 RX ADMIN — SODIUM CHLORIDE 500 ML: 9 INJECTION, SOLUTION INTRAVENOUS at 08:57

## 2023-01-26 RX ADMIN — Medication 10 ML: at 13:00

## 2023-01-26 RX ADMIN — IMMUNE GLOBULIN INFUSION (HUMAN) 20 G: 100 INJECTION, SOLUTION INTRAVENOUS; SUBCUTANEOUS at 09:41

## 2023-01-30 RX ORDER — VALSARTAN 160 MG/1
160 TABLET ORAL DAILY
Qty: 90 TABLET | Refills: 3 | Status: ON HOLD | OUTPATIENT
Start: 2023-01-30

## 2023-02-06 ENCOUNTER — OFFICE VISIT (OUTPATIENT)
Dept: UROLOGY | Facility: CLINIC | Age: 68
End: 2023-02-06
Payer: MEDICARE

## 2023-02-06 ENCOUNTER — PATIENT ROUNDING (BHMG ONLY) (OUTPATIENT)
Dept: UROLOGY | Facility: CLINIC | Age: 68
End: 2023-02-06
Payer: MEDICARE

## 2023-02-06 VITALS
SYSTOLIC BLOOD PRESSURE: 154 MMHG | HEART RATE: 86 BPM | DIASTOLIC BLOOD PRESSURE: 90 MMHG | WEIGHT: 214.6 LBS | HEIGHT: 64 IN | TEMPERATURE: 97.5 F | OXYGEN SATURATION: 96 % | BODY MASS INDEX: 36.64 KG/M2

## 2023-02-06 DIAGNOSIS — N95.8 GENITOURINARY SYNDROME OF MENOPAUSE: ICD-10-CM

## 2023-02-06 DIAGNOSIS — N39.41 URGE INCONTINENCE OF URINE: Primary | ICD-10-CM

## 2023-02-06 DIAGNOSIS — N81.6 CYSTOCELE WITH RECTOCELE: ICD-10-CM

## 2023-02-06 DIAGNOSIS — N81.10 CYSTOCELE WITH RECTOCELE: ICD-10-CM

## 2023-02-06 PROCEDURE — 99214 OFFICE O/P EST MOD 30 MIN: CPT | Performed by: NURSE PRACTITIONER

## 2023-02-06 RX ORDER — ESTRADIOL 0.1 MG/G
CREAM VAGINAL
Qty: 42.5 G | Refills: 3 | Status: ON HOLD | OUTPATIENT
Start: 2023-02-06

## 2023-02-06 NOTE — PROGRESS NOTES
Chief Complaint  Chief Complaint   Patient presents with   • Urinary Incontinence   • Establish Care          HPI  Ms. Mariee is a 67 y.o. female presents with complaint of urinary incontinence for 20+ years  Had a Birch procedure on her bladder when she had her hysterectomy   Has not previously seen urology.  Takes P relief which helped her urinary incontinence some but is no longer continuing to help, has changed her diet and cut out almost all caffeine and still continues to have urge incontinence    Pt has primarily urge urinary incontinence.     Severity: Pt uses 5+ pads a day and has tried ditropan and myrbetriq in the past and failed these medications  Associated Sx: Pt has  h/o daytime frequency, urgency and nocturia x 3-4, slow stream and will have to change positions to get flow started      No h/o poor stream, straining, hesitancy or incomplete voiding     No h/o recurrent UTI, hematuria, nephrolithiasis    No vaginal discharge or bleeding  No sensation of POP  yes Post-void dribbling  no Dyspareunia   yes Constipation- takes Mirilax and stool softner  3 Vaginal deliveries    Past Medical History  Past Medical History:   Diagnosis Date   • Anxiety    • COPD (chronic obstructive pulmonary disease) ([facility])    • Depression    • Diabetes mellitus ([facility])    • Eosinophilic asthma    • Fibromyalgia, primary    • Hypertension    • Hypogammaglobulinemia ([facility])    • Hypothyroidism    • Irritable bowel syndrome    • Migraines    • Morbid obesity ([facility])    • Osteoarthritis    • Prinzmetal angina ([facility])    • Prinzmetal angina ([facility]) 1990   • PTSD (post-traumatic stress disorder)    • Renal insufficiency    • Sinus tachycardia 1990   • TIA (transient ischemic attack)    • Trochanteric bursitis 01/25/2023       Past Surgical History  Past Surgical History:   Procedure Laterality Date   • APPENDECTOMY     • BUNIONECTOMY     • GASTRIC BYPASS     • HAND SURGERY Right    • REPLACEMENT TOTAL KNEE BILATERAL     • TEETH EXTRACTION       all of bottom teeth removed   • TONSILLECTOMY     • TOTAL ABDOMINAL HYSTERECTOMY WITH SALPINGO OOPHORECTOMY         Medications  Current Outpatient Medications   Medication Sig Dispense Refill   • acetaminophen-codeine (TYLENOL #3) 300-30 MG per tablet Take 1 tablet by mouth Every 4 (Four) Hours As Needed for Moderate Pain. 10 tablet 0   • albuterol sulfate  (90 Base) MCG/ACT inhaler Inhale 2 puffs by mouth every 4-6 hours as needed for cough, wheeze, shortness of breath. Use with vortex spacer 8.5 g 3   • albuterol sulfate  (90 Base) MCG/ACT inhaler Inhale 2 puffs by mouth every 4 to 6 hours as needed for cough, wheeze, shortness of breath. Use with vortex spacer 8.5 g 3   • allopurinol (Zyloprim) 100 MG tablet Take 1 tablet by mouth Daily. 90 tablet 3   • betamethasone valerate (VALISONE) 0.1 % cream Apply topically to the appropriate area as directed Daily. 45 g 11   • Budeson-Glycopyrrol-Formoterol (Breztri Aerosphere) 160-9-4.8 MCG/ACT aerosol inhaler Inhale 2 puffs by mouth twice daily. Rinse mouth after use 10.7 g 11   • buPROPion SR (WELLBUTRIN SR) 150 MG 12 hr tablet Take 1 tablet by mouth 2 (Two) Times a Day. 180 tablet 3   • calcium carbonate (Antacid Maximum) 1000 MG chewable tablet Chew 500 mg 3 (Three) Times a Day.     • cholecalciferol (VITAMIN D3) 25 MCG (1000 UT) tablet Take 1,000 Units by mouth Daily.     • clonazePAM (KlonoPIN) 0.5 MG tablet Take 1 tablet by mouth At Night As Needed 30 tablet 2   • dicyclomine (BENTYL) 10 MG capsule Take 1 capsule by mouth 3 (Three) Times a Day. 270 capsule 3   • diphenhydrAMINE (Benadryl Allergy) 25 MG tablet Take 1-2 tablets by mouth Every 4-6 Hours As Needed. 90 tablet 11   • ferrous sulfate 325 (65 FE) MG EC tablet Take 325 mg by mouth.     • fexofenadine (ALLEGRA) 180 MG tablet Take 180 mg by mouth Daily.     • fluticasone (FLONASE) 50 MCG/ACT nasal spray Instill 2 sprays into each nostril daily. (Patient taking differently: 2 sprays by Each  Nare route 2 (Two) Times a Day.) 16 g 11   • glucosamine-chondroitin 500-400 MG capsule capsule Take 1 capsule by mouth.     • Hypertonic Nasal Wash (Sinus Rinse Kit) pack use once or twice daily as needed 1 each 3   • immune globulin, human, (Gammagard) 20 GM/200ML solution infusion Infuse 40 g into a venous catheter Every 28 (Twenty-Eight) Days. 400 mL 0   • ipratropium (ATROVENT) 0.06 % nasal spray Instill 1-2 sprays each nostril 2-3 times daily as needed 15 mL 3   • ipratropium-albuterol (DUO-NEB) 0.5-2.5 mg/3 ml nebulizer Inhale contents of 1 vial (3mL) via nebulizer every 4-6 hours as needed for coughing, wheezing, or shortness of breath. 270 mL 3   • ipratropium-albuterol (DUO-NEB) 0.5-2.5 mg/3 ml nebulizer Inhale the contents of 1 vial into nebulizer every 4-6 hours as needed for cough,wheezing and shortness of breath. 90 mL 3   • isosorbide mononitrate (IMDUR) 30 MG 24 hr tablet Take 1 tablet by mouth Every Morning. 90 tablet 3   • levothyroxine (SYNTHROID, LEVOTHROID) 50 MCG tablet Take 1 tablet by mouth Daily. 90 tablet 3   • Magnesium 250 MG tablet Take 500 mg by mouth 2 (Two) Times a Day.     • montelukast (SINGULAIR) 10 MG tablet Take 1 tablet by mouth every night at bedtime. 30 tablet 11   • montelukast (SINGULAIR) 10 MG tablet Take 1 tablet by mouth every night at bedtime. 30 tablet 11   • multivitamin with minerals tablet tablet Take 1 tablet by mouth Daily.     • ondansetron (ZOFRAN) 4 MG tablet Take 1 tablet by mouth Every 8 (Eight) Hours As Needed for Nausea or Vomiting. 30 tablet 11   • POTASSIUM CHLORIDE PO Take 20 mEq by mouth See Admin Instructions. Takes two times a month with Lasix     • potassium citrate (UROCIT-K) 10 MEQ (1080 MG) CR tablet Take 1 tablet by mouth Daily. 90 tablet 3   • predniSONE (DELTASONE) 20 MG tablet Take 2 tablets prior to Gammgard infusion 6 tablet 3   • predniSONE (DELTASONE) 20 MG tablet Take 2 tablets by mouth prior to Gammgard infusion 6 tablet 3   • predniSONE  (DELTASONE) 20 MG tablet Take 2 tablets prior to Gammgard infusion 6 tablet 3   • Semaglutide (Rybelsus) 7 MG tablet Take 1 tablet by mouth Daily. 90 tablet 3   • Spacer/Aero-Holding Chambers (AeroChamber Plus Wlai-Vu) misc Use as directed with albuterol inhaler 1 each 0   • Spacer/Aero-Holding Chambers (AeroChamber Plus Wali-Vu) misc Use as directed with inhaler 1 each 0   • tiZANidine (ZANAFLEX) 4 MG tablet Take 1 tablet by mouth Every Night. 90 tablet 1   • ubrogepant 100 MG tablet Take 1 tablet by mouth 1 (One) Time As Needed (migraine) for up to 1 dose. 4 tablet 0   • valsartan (DIOVAN) 160 MG tablet Take 1 tablet by mouth Daily. 90 tablet 3   • vitamin B-12 (CYANOCOBALAMIN) 1000 MCG tablet Take 1,000 mcg by mouth Daily.     • azithromycin (Zithromax) 250 MG tablet Take 2 tablets by mouth the first day, then 1 tablet daily for 4 days. 6 tablet 0   • furosemide (LASIX) 40 MG tablet Take 1 tablet by mouth Daily. Two days a month. 30 tablet 1   • HYDROcod Polst-CPM Polst ER (Tussionex Pennkinetic ER) 10-8 MG/5ML ER suspension Take 1 teaspoonful (5mL) by mouth Every 12 (Twelve) Hours As Needed for Cough. 60 mL 0   • melatonin 5 MG tablet tablet Take 10 mg by mouth Every Night.     • omeprazole (priLOSEC) 40 MG capsule Take 1 capsule by mouth 2 (Two) Times a Day. 180 capsule 3     Current Facility-Administered Medications   Medication Dose Route Frequency Provider Last Rate Last Admin   • heparin injection 500 Units  5 mL Intravenous PRN Sanjeev Rawls MD       • sodium chloride 0.9 % flush 10 mL  10 mL Intravenous Q12H Sanjeev Rawls MD       • sodium chloride 0.9 % flush 10 mL  10 mL Intravenous PRN Sanjeev Rawls MD       • sodium chloride 0.9 % flush 20 mL  20 mL Intravenous PRN Sanjeev Rawls MD           Allergies  Allergies   Allergen Reactions   • Cefaclor Hives and Swelling     Other reaction(s): SWELLING  Shed 4 layers of skin and extremely SOB  Shed 4 layers of skin and extremely SOB     •  Cephalosporins Hives, Angioedema and Swelling     Rechallenge with cefipime caused rash on 1/14/08.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   • Verapamil Nausea And Vomiting     Dizzy   • Zolpidem Other (See Comments) and Unknown (See Comments)     Automatic behaviour in sleep.  Automatic behaviour in sleep.  confusion   • Cephalexin Rash   • Edecrin [Ethacrynic Acid] Rash and Other (See Comments)     Weight gain   • Hydrochlorothiazide Hives   • Hydrocodone-Acetaminophen GI Intolerance   • Sulfa Antibiotics Hives       Social History  Social History     Socioeconomic History   • Marital status:    Tobacco Use   • Smoking status: Never   • Smokeless tobacco: Never   Vaping Use   • Vaping Use: Never used   Substance and Sexual Activity   • Alcohol use: Not Currently     Comment: ONCE A YEAR   • Drug use: Never   • Sexual activity: Defer       Family History  Family History   Problem Relation Age of Onset   • Diabetes Mother    • Stroke Mother    • Heart disease Mother    • Hypertension Mother    • Endometriosis Mother    • Diabetes Father    • Hypertension Father    • Stroke Father    • Heart attack Father    • Asthma Father    • COPD Father    • Dementia Father    • COPD Sister    • Asthma Sister    • Cancer Sister    • Brain cancer Sister    • Diabetes Sister    • Stroke Sister    • Heart attack Sister    • Endometriosis Sister    • Depression Sister  "   • COPD Brother    • Asthma Brother    • Diabetes Brother    • Asthma Daughter    • Endometriosis Daughter    • Osteoarthritis Daughter    • Hypertension Daughter    • Kidney failure Daughter    • Irritable bowel syndrome Daughter    • Anxiety disorder Daughter    • Bipolar disorder Daughter    • Depression Daughter    • Self-Injurious Behavior  Daughter    • Suicide Attempts Daughter    • Asthma Son    • ADD / ADHD Son    • Alcohol abuse Son    • Drug abuse Son    • Asthma Brother    • COPD Brother    • Diabetes Brother    • Hypertension Brother    • Kidney disease Sister    • Diabetes Sister    • COPD Sister    • Asthma Sister    • Endometriosis Sister    • Depression Sister    • Heart attack Sister    • Endometriosis Sister    • Asthma Daughter    • Endometriosis Daughter    • Osteoarthritis Daughter    • OCD Neg Hx    • Paranoid behavior Neg Hx    • Schizophrenia Neg Hx    • Seizures Neg Hx          Physical Exam  Visit Vitals  /90   Pulse 86   Temp 97.5 °F (36.4 °C)   Ht 162.6 cm (64.02\")   Wt 97.3 kg (214 lb 9.6 oz)   SpO2 96%   BMI 36.82 kg/m²     Physical exam was notable for stage I cystocele and stage II rectocele with Valsalva.  Bilateral vulvar dry to touch no ulcerations or lesions    Labs  Brief Urine Lab Results  (Last result in the past 365 days)      Color   Clarity   Blood   Leuk Est   Nitrite   Protein   CREAT   Urine HCG        03/22/22 1347 Yellow  Comment: REFERENCE RANGE: Yellow, Straw   Clear   Negative   Negative   Negative   Negative                 Lab Results   Component Value Date    GLUCOSE 132 (H) 10/06/2022    CALCIUM 9.3 10/06/2022     (L) 10/06/2022    K 4.5 10/06/2022    CO2 24.4 10/06/2022    CL 99 10/06/2022    BUN 11 10/06/2022    CREATININE 1.12 (H) 10/06/2022    EGFRIFAFRI 50 (L) 06/30/2021    EGFRIFNONA 48 (L) 02/14/2022    BCR 9.8 10/06/2022    ANIONGAP 9.6 10/06/2022       Lab Results   Component Value Date    WBC 5.10 10/06/2022    HGB 12.0 10/06/2022    HCT " 35.0 10/06/2022    MCV 93.6 10/06/2022     10/06/2022       Assessment  Ms. Mariee is a 67 y.o. female with stress urinary incontinence as well as cystocele and rectocele.  We discussed treatment options for cystocele and rectocele including pessary and A&P repair.  Patient is interested in surgical manage we will send her for consult with gynecology to evaluate and treat    We discussed the AUA guidelines for management of stress urinary incontinence.  We discussed served of management with lifestyle changes like weight reduction and pelvic floor physical therapy.  We discussed surgical management with either mid urethral sling versus cystoscopy with bulking agent injection.  The patient has elected PNE trial.  We discussed the risks, benefits, and alternatives.  The patient voiced her understanding and wished to proceed.    Plan  1.  PNE with Dr. Nation on 3/15  2.  Refer to gynecology for evaluation of A&P repair      LILIBETH Cherry, NP-C  [facility]

## 2023-02-07 DIAGNOSIS — N39.41 URGE INCONTINENCE OF URINE: ICD-10-CM

## 2023-02-10 NOTE — PROGRESS NOTES
February 10, 2023    Hello, may I speak with Raquel Mariee?    My name is Dalia.     I am  with [facility]  [address]  [address]  [facility] [address] [phone].    Before we get started may I verify your date of birth? 1955    I am calling to officially welcome you to our practice and ask about your recent visit. Is this a good time to talk? Yes.    Tell me about your visit with us. What things went well?  Patient stated her visit was very well.       We're always looking for ways to make our patients' experiences even better. Do you have recommendations on ways we may improve? Patient could not think of anything she stated that her visit went smoothly.    Overall were you satisfied with your first visit to our practice? Patient was satisfied.       I appreciate you taking the time to speak with me today. Is there anything else I can do for you? Not at this time per patient.      Thank you, and have a great day.

## 2023-02-14 DIAGNOSIS — F32.0 CURRENT MILD EPISODE OF MAJOR DEPRESSIVE DISORDER, UNSPECIFIED WHETHER RECURRENT: Chronic | ICD-10-CM

## 2023-02-14 RX ORDER — BUPROPION HYDROCHLORIDE 150 MG/1
150 TABLET, EXTENDED RELEASE ORAL 2 TIMES DAILY
Qty: 180 TABLET | Refills: 3 | Status: ON HOLD | OUTPATIENT
Start: 2023-02-14

## 2023-02-14 NOTE — TELEPHONE ENCOUNTER
Rx Refill Note  Requested Prescriptions     Pending Prescriptions Disp Refills   • buPROPion SR (WELLBUTRIN SR) 150 MG 12 hr tablet 180 tablet 3     Sig: Take 1 tablet by mouth 2 (Two) Times a Day.      Last office visit with prescribing clinician: 1/25/2023   Last telemedicine visit with prescribing clinician: 3/17/2023   Next office visit with prescribing clinician: 3/17/2023                         Would you like a call back once the refill request has been completed: [] Yes [] No    If the office needs to give you a call back, can they leave a voicemail: [] Yes [] No    Mavis Grajeda MA  02/14/23, 08:40 EST

## 2023-02-23 ENCOUNTER — HOSPITAL ENCOUNTER (OUTPATIENT)
Dept: INFUSION THERAPY | Facility: HOSPITAL | Age: 68
Discharge: HOME | End: 2023-02-23
Admitting: ALLERGY & IMMUNOLOGY
Payer: MEDICARE

## 2023-02-23 VITALS
BODY MASS INDEX: 35 KG/M2 | WEIGHT: 204 LBS | TEMPERATURE: 97.3 F | SYSTOLIC BLOOD PRESSURE: 156 MMHG | HEART RATE: 75 BPM | DIASTOLIC BLOOD PRESSURE: 95 MMHG

## 2023-02-23 DIAGNOSIS — Z95.828 PORT-A-CATH IN PLACE: Primary | ICD-10-CM

## 2023-02-23 DIAGNOSIS — D80.1 COMMON VARIABLE AGAMMAGLOBULINEMIA: ICD-10-CM

## 2023-02-23 PROCEDURE — 96365 THER/PROPH/DIAG IV INF INIT: CPT

## 2023-02-23 PROCEDURE — 96361 HYDRATE IV INFUSION ADD-ON: CPT

## 2023-02-23 PROCEDURE — 96366 THER/PROPH/DIAG IV INF ADDON: CPT

## 2023-02-23 PROCEDURE — 25010000002 IMMUNE GLOBULIN (HUMAN) 20 GM/200ML SOLUTION: Performed by: ALLERGY & IMMUNOLOGY

## 2023-02-23 PROCEDURE — 25010000002 HEPARIN LOCK FLUSH PER 10 UNITS: Performed by: ALLERGY & IMMUNOLOGY

## 2023-02-23 RX ORDER — PREDNISONE 20 MG/1
40 TABLET ORAL ONCE AS NEEDED
Status: DISCONTINUED | OUTPATIENT
Start: 2023-02-23 | End: 2023-02-25 | Stop reason: HOSPADM

## 2023-02-23 RX ORDER — DIPHENHYDRAMINE HCL 25 MG
50 CAPSULE ORAL ONCE
Status: DISCONTINUED | OUTPATIENT
Start: 2023-02-23 | End: 2023-02-25 | Stop reason: HOSPADM

## 2023-02-23 RX ORDER — PREDNISONE 20 MG/1
40 TABLET ORAL ONCE
Status: CANCELLED
Start: 2023-03-23 | End: 2023-03-23

## 2023-02-23 RX ORDER — ACETAMINOPHEN 325 MG/1
325 TABLET ORAL ONCE
Status: CANCELLED | OUTPATIENT
Start: 2023-03-23

## 2023-02-23 RX ORDER — DIPHENHYDRAMINE HCL 25 MG
50 CAPSULE ORAL ONCE
Status: CANCELLED
Start: 2023-03-23 | End: 2023-03-23

## 2023-02-23 RX ORDER — HEPARIN SODIUM (PORCINE) LOCK FLUSH IV SOLN 100 UNIT/ML 100 UNIT/ML
500 SOLUTION INTRAVENOUS AS NEEDED
Status: DISCONTINUED | OUTPATIENT
Start: 2023-02-23 | End: 2023-02-25 | Stop reason: HOSPADM

## 2023-02-23 RX ORDER — EPINEPHRINE 1 MG/ML
0.3 INJECTION, SOLUTION INTRAMUSCULAR; SUBCUTANEOUS ONCE AS NEEDED
Status: DISCONTINUED | OUTPATIENT
Start: 2023-02-23 | End: 2023-02-25 | Stop reason: HOSPADM

## 2023-02-23 RX ORDER — DIPHENHYDRAMINE HCL 25 MG
50 CAPSULE ORAL ONCE AS NEEDED
Status: CANCELLED
Start: 2023-03-23

## 2023-02-23 RX ORDER — PREDNISONE 20 MG/1
40 TABLET ORAL ONCE AS NEEDED
Status: CANCELLED
Start: 2023-03-23

## 2023-02-23 RX ORDER — SODIUM CHLORIDE 0.9 % (FLUSH) 0.9 %
10 SYRINGE (ML) INJECTION AS NEEDED
Status: DISCONTINUED | OUTPATIENT
Start: 2023-02-23 | End: 2023-02-25 | Stop reason: HOSPADM

## 2023-02-23 RX ORDER — METHYLPREDNISOLONE SODIUM SUCCINATE 125 MG/2ML
50 INJECTION, POWDER, LYOPHILIZED, FOR SOLUTION INTRAMUSCULAR; INTRAVENOUS ONCE AS NEEDED
Status: CANCELLED
Start: 2023-03-23

## 2023-02-23 RX ORDER — METHYLPREDNISOLONE SODIUM SUCCINATE 125 MG/2ML
50 INJECTION, POWDER, LYOPHILIZED, FOR SOLUTION INTRAMUSCULAR; INTRAVENOUS ONCE AS NEEDED
Status: DISCONTINUED | OUTPATIENT
Start: 2023-02-23 | End: 2023-02-25 | Stop reason: HOSPADM

## 2023-02-23 RX ORDER — HEPARIN SODIUM (PORCINE) LOCK FLUSH IV SOLN 100 UNIT/ML 100 UNIT/ML
500 SOLUTION INTRAVENOUS AS NEEDED
Status: CANCELLED | OUTPATIENT
Start: 2023-02-23

## 2023-02-23 RX ORDER — ACETAMINOPHEN 325 MG/1
325 TABLET ORAL ONCE
Status: DISCONTINUED | OUTPATIENT
Start: 2023-02-23 | End: 2023-02-25 | Stop reason: HOSPADM

## 2023-02-23 RX ORDER — DIPHENHYDRAMINE HCL 25 MG
50 CAPSULE ORAL ONCE AS NEEDED
Status: DISCONTINUED | OUTPATIENT
Start: 2023-02-23 | End: 2023-02-25 | Stop reason: HOSPADM

## 2023-02-23 RX ORDER — EPINEPHRINE 1 MG/ML
0.3 INJECTION, SOLUTION INTRAMUSCULAR; SUBCUTANEOUS ONCE AS NEEDED
Status: CANCELLED
Start: 2023-03-23

## 2023-02-23 RX ORDER — SODIUM CHLORIDE 0.9 % (FLUSH) 0.9 %
10 SYRINGE (ML) INJECTION AS NEEDED
Status: CANCELLED | OUTPATIENT
Start: 2023-02-23

## 2023-02-23 RX ORDER — PREDNISONE 20 MG/1
40 TABLET ORAL ONCE
Status: DISCONTINUED | OUTPATIENT
Start: 2023-02-23 | End: 2023-02-25 | Stop reason: HOSPADM

## 2023-02-23 RX ADMIN — IMMUNE GLOBULIN INFUSION (HUMAN) 20 G: 100 INJECTION, SOLUTION INTRAVENOUS; SUBCUTANEOUS at 09:35

## 2023-02-23 RX ADMIN — IMMUNE GLOBULIN INFUSION (HUMAN) 20 G: 100 INJECTION, SOLUTION INTRAVENOUS; SUBCUTANEOUS at 11:34

## 2023-02-23 RX ADMIN — Medication 500 UNITS: at 13:33

## 2023-02-23 RX ADMIN — Medication 10 ML: at 13:33

## 2023-02-23 RX ADMIN — SODIUM CHLORIDE 500 ML: 9 INJECTION, SOLUTION INTRAVENOUS at 08:55

## 2023-03-01 ENCOUNTER — APPOINTMENT (OUTPATIENT)
Dept: OTHER | Facility: HOSPITAL | Age: 68
End: 2023-03-01
Payer: MEDICARE

## 2023-03-01 ENCOUNTER — HOSPITAL ENCOUNTER (OUTPATIENT)
Dept: MAMMOGRAPHY | Facility: HOSPITAL | Age: 68
Discharge: HOME | End: 2023-03-01
Payer: MEDICARE

## 2023-03-01 DIAGNOSIS — Z92.89 HX OF MAMMOGRAM: ICD-10-CM

## 2023-03-01 DIAGNOSIS — Z12.31 VISIT FOR SCREENING MAMMOGRAM: ICD-10-CM

## 2023-03-01 PROCEDURE — 77067 SCR MAMMO BI INCL CAD: CPT | Performed by: RADIOLOGY

## 2023-03-01 PROCEDURE — 77063 BREAST TOMOSYNTHESIS BI: CPT

## 2023-03-01 PROCEDURE — 77063 BREAST TOMOSYNTHESIS BI: CPT | Performed by: RADIOLOGY

## 2023-03-01 PROCEDURE — 77067 SCR MAMMO BI INCL CAD: CPT

## 2023-03-15 ENCOUNTER — HOSPITAL ENCOUNTER (OUTPATIENT)
Dept: PREOP | Facility: HOSPITAL | Age: 68
Discharge: HOME | End: 2023-03-15
Admitting: UROLOGY
Payer: MEDICARE

## 2023-03-15 VITALS
SYSTOLIC BLOOD PRESSURE: 133 MMHG | WEIGHT: 199 LBS | BODY MASS INDEX: 33.97 KG/M2 | TEMPERATURE: 98.2 F | OXYGEN SATURATION: 95 % | HEIGHT: 64 IN | HEART RATE: 94 BPM | RESPIRATION RATE: 18 BRPM | DIASTOLIC BLOOD PRESSURE: 83 MMHG

## 2023-03-15 DIAGNOSIS — N39.41 URGE INCONTINENCE OF URINE: ICD-10-CM

## 2023-03-15 PROCEDURE — C1787 PATIENT PROGR, NEUROSTIM: HCPCS

## 2023-03-15 PROCEDURE — C1897 LEAD, NEUROSTIM TEST KIT: HCPCS

## 2023-03-15 PROCEDURE — 64561 IMPLANT NEUROELECTRODES: CPT | Performed by: UROLOGY

## 2023-03-15 PROCEDURE — A9270 NON-COVERED ITEM OR SERVICE: HCPCS | Performed by: UROLOGY

## 2023-03-15 PROCEDURE — 63710000001 CLINDAMYCIN 150 MG CAPSULE: Performed by: UROLOGY

## 2023-03-15 RX ORDER — CLINDAMYCIN HYDROCHLORIDE 150 MG/1
300 CAPSULE ORAL ONCE
Status: COMPLETED | OUTPATIENT
Start: 2023-03-15 | End: 2023-03-15

## 2023-03-15 RX ORDER — LIDOCAINE HYDROCHLORIDE AND EPINEPHRINE 10; 10 MG/ML; UG/ML
40 INJECTION, SOLUTION INFILTRATION; PERINEURAL ONCE
Status: COMPLETED | OUTPATIENT
Start: 2023-03-15 | End: 2023-03-15

## 2023-03-15 RX ADMIN — LIDOCAINE HYDROCHLORIDE,EPINEPHRINE BITARTRATE 40 ML: 10; .01 INJECTION, SOLUTION INFILTRATION; PERINEURAL at 12:59

## 2023-03-15 RX ADMIN — CLINDAMYCIN HYDROCHLORIDE 300 MG: 150 CAPSULE ORAL at 12:26

## 2023-03-15 NOTE — DISCHARGE INSTRUCTIONS
Home Care After Interstim Test Leads  The following instructions will help you care for yourself, or be cared for upon your return home today.  These are guidelines for your care right after the procedure only.     Diet  Continue your regular diet today.    Activity  Start normal activities in twenty-four (24) hours  Try to avoid any extreme physical activity while the leads are in.    Wound Care and Hygiene  Front showering or sponge bath is ok    What to Expect after Procedure  Soreness over the needle site  Mild bleeding at the needle site.    Call your Doctor  Severe pain not controlled by oral medication  Temperature above 101.5 degrees  Inability to urinate within eight (8) hours after surgery  Drainage from the incision    Other Contacts  Urology Office:  [id number]   [place] [address]  [phone] office  [phone] fax    Follow up Appointment  You have a follow up scheduled next week

## 2023-03-15 NOTE — PROCEDURES
Procedure Note     SURGEON: Abner Nation MD    PREOPERATIVE DIAGNOSIS: Refractory Urge Urinary Incontinence     POSTOPERATIVE DIAGNOSIS: Same     PROCEDURE PERFORMED:   1. Basic Evaluation (PNE) without fluoroscopy using the enhanced Verify™ System - (CPT Code: 24359-86), bilateral lead placement    ANESTHESIA: Lidocaine 2%     BLOOD LOSS: Minimal.     SPECIMEN: None.     COMPLICATION: None.     INDICATION FOR PROCEDURE: Pt Raquel Mariee is a 67 y.o. y.o.-year-old with Refractory Overactive bladder / UUI.  After discussion a decision was made to proceed with a trial of InterStim placement with basic evaluation.     DESCRIPTION OF THE PROCEDURE:   The Patient was properly identified and placed in prone position. Pillows were placed under lower abdomen to flatten sacrum and under shins to allow the toes to dangle freely. A ground pad was placed on the bottom of the patient’s foot and the proximal ends of the test stimulation cable were connected to the ground pad and the external neurostimulator (ENS). Patient was prepped and draped in usual sterile fashion.   The sciatic notches and sacral midline were identified via palpation. The level of S3 was identified by measuring 9cm from the tip of the coccyx. Local injection of 2% Lidocaine was administered at foramen needle entry point located 2cm lateral to the sacral midline and 2cm cephalad of sciatic notch level. A foramen needle was introduced at an approximate 60 degree angle, feeling for the foraminal margins until S3 was identified. Proper needle position was confirmed by the patient identifying location of stimulation sensation; and direct observation of the lifting of the perineum or “bellowing,” and plantar flexion of the great toe utilizing the test stimulation cable, ENS and Verify™ .     The foramen needle stylet was removed, and a percutaneous lead was inserted to proper depth identified by markers on the lead. The lead was tested by  connecting the test stimulation cable to the proximal lead electrode and testing with the ENS and . Upon response confirmation, the foramen needle was removed by sliding over the percutaneous lead. The foramen needle and lead stylet were removed while securing lead location. Retesting to confirm appropriate lead response was completed.   The above procedure was performed again on the contralateral side.     The leads were connected to the patient cables. The Patient’s skin was cleaned and external cabling was secured by covering with transparent dressing. Estimated blood loss was minimal. Post procedure, the patient was programmed with the ENS and Verify™  to optimum sensation via the lead and provided utilization instructions prior to discharge. Patient will complete a bladder diary during testing period to help document results of this procedure. The patient will follow up in 1 week for full interstim implantation if they have >50% benefit.

## 2023-03-22 ENCOUNTER — OFFICE VISIT (OUTPATIENT)
Dept: UROLOGY | Facility: CLINIC | Age: 68
End: 2023-03-22
Payer: MEDICARE

## 2023-03-22 VITALS — BODY MASS INDEX: 33.97 KG/M2 | WEIGHT: 199 LBS | HEIGHT: 64 IN

## 2023-03-22 DIAGNOSIS — N39.41 URGE INCONTINENCE OF URINE: Primary | ICD-10-CM

## 2023-03-22 PROCEDURE — 1159F MED LIST DOCD IN RCRD: CPT | Performed by: NURSE PRACTITIONER

## 2023-03-22 PROCEDURE — 99024 POSTOP FOLLOW-UP VISIT: CPT | Performed by: NURSE PRACTITIONER

## 2023-03-22 PROCEDURE — 1160F RVW MEDS BY RX/DR IN RCRD: CPT | Performed by: NURSE PRACTITIONER

## 2023-03-22 NOTE — PROGRESS NOTES
Office Visit General Established Female Patient     Patient Name: Raquel Mariee  : 1955   MRN: [id number]     Chief Complaint:   Chief Complaint   Patient presents with   • Follow-up     Lead removal       Referring Provider: No ref. provider found    History of Present Illness: Raquel Mariee is a 67 y.o. female with history below in assessment, who presents for follow up.  Patient is here today for PNE lead pull.  Patient reports improvements in her urinary symptoms to be 50% or greater and is satisfied with these results.  She went from 2 pads daily to 0 pads from 1 leaks daily to 0 leaks as well as 50% improvement in voids asleep and urgency    Subjective      Review of System:   As noted in HPI     Past Medical History:   Past Medical History:   Diagnosis Date   • Anxiety    • Aortic valve stenosis    • Cardiac murmur    • Chronic kidney disease    • Clostridium difficile infection     in her 30s   • COPD (chronic obstructive pulmonary disease) ([facility])    • Depression    • Diabetes mellitus ([facility])     type II   • Eosinophilic asthma    • Fibromyalgia, primary    • Hypertension    • Hypogammaglobulinemia ([facility])    • Hypothyroidism    • Irritable bowel syndrome    • Migraines    • Morbid obesity ([facility])    • Osteoarthritis    • Prinzmetal angina ([facility])    • Pseudomonas infection    • PTSD (post-traumatic stress disorder)    • Renal insufficiency    • Sinus tachycardia    • Sleep apnea     no longer uses/needs cpap   • Tachycardia    • TIA (transient ischemic attack)    • Trochanteric bursitis 2023       Past Surgical History:   Past Surgical History:   Procedure Laterality Date   • APPENDECTOMY     • BUNIONECTOMY Left    • CARDIAC CATHETERIZATION  2017    no stents needed   • CARPAL TUNNEL RELEASE Right    • GASTRIC BYPASS     • HAND SURGERY Left    • REPLACEMENT TOTAL KNEE BILATERAL     • TEETH EXTRACTION      all of bottom teeth removed   • TONSILLECTOMY     • TOTAL ABDOMINAL  HYSTERECTOMY WITH SALPINGO OOPHORECTOMY         Family History:   Family History   Problem Relation Age of Onset   • Diabetes Mother    • Stroke Mother    • Heart disease Mother    • Hypertension Mother    • Endometriosis Mother    • Diabetes Father    • Hypertension Father    • Stroke Father    • Heart attack Father    • Asthma Father    • COPD Father    • Dementia Father    • COPD Sister    • Asthma Sister    • Cancer Sister    • Brain cancer Sister    • Diabetes Sister    • Stroke Sister    • Heart attack Sister    • Endometriosis Sister    • Depression Sister    • Kidney disease Sister    • Diabetes Sister    • COPD Sister    • Asthma Sister    • Endometriosis Sister    • Depression Sister    • Heart attack Sister    • Endometriosis Sister    • COPD Brother    • Asthma Brother    • Diabetes Brother    • Asthma Brother    • COPD Brother    • Diabetes Brother    • Hypertension Brother    • Asthma Daughter    • Endometriosis Daughter    • Osteoarthritis Daughter    • Hypertension Daughter    • Kidney failure Daughter    • Irritable bowel syndrome Daughter    • Anxiety disorder Daughter    • Bipolar disorder Daughter    • Depression Daughter    • Self-Injurious Behavior  Daughter    • Suicide Attempts Daughter    • Asthma Daughter    • Endometriosis Daughter    • Osteoarthritis Daughter    • Asthma Son    • ADD / ADHD Son    • Alcohol abuse Son    • Drug abuse Son    • Breast cancer Maternal Cousin    • OCD Neg Hx    • Paranoid behavior Neg Hx    • Schizophrenia Neg Hx    • Seizures Neg Hx    • Ovarian cancer Neg Hx        Social History:   Social History     Socioeconomic History   • Marital status:    Tobacco Use   • Smoking status: Never   • Smokeless tobacco: Never   Vaping Use   • Vaping Use: Never used   Substance and Sexual Activity   • Alcohol use: Not Currently     Comment: ONCE A YEAR   • Drug use: Never   • Sexual activity: Defer       Medications:     Current Outpatient Medications:   •   acetaminophen-codeine (TYLENOL #3) 300-30 MG per tablet, Take 1 tablet by mouth Every 4 (Four) Hours As Needed for Moderate Pain., Disp: 10 tablet, Rfl: 0  •  albuterol sulfate  (90 Base) MCG/ACT inhaler, Inhale 2 puffs by mouth every 4 to 6 hours as needed for cough, wheeze, shortness of breath. Use with vortex spacer, Disp: 8.5 g, Rfl: 3  •  allopurinol (Zyloprim) 100 MG tablet, Take 1 tablet by mouth Daily., Disp: 90 tablet, Rfl: 3  •  betamethasone valerate (VALISONE) 0.1 % cream, Apply topically to the appropriate area as directed Daily., Disp: 45 g, Rfl: 11  •  Budeson-Glycopyrrol-Formoterol (Breztri Aerosphere) 160-9-4.8 MCG/ACT aerosol inhaler, Inhale 2 puffs by mouth twice daily. Rinse mouth after use, Disp: 10.7 g, Rfl: 11  •  buPROPion SR (WELLBUTRIN SR) 150 MG 12 hr tablet, Take 1 tablet by mouth 2 (Two) Times a Day., Disp: 180 tablet, Rfl: 3  •  calcium carbonate (Antacid Maximum) 1000 MG chewable tablet, Chew 500 mg 3 (Three) Times a Day., Disp: , Rfl:   •  cholecalciferol (VITAMIN D3) 25 MCG (1000 UT) tablet, Take 1 tablet by mouth Daily., Disp: , Rfl:   •  clonazePAM (KlonoPIN) 0.5 MG tablet, Take 1 tablet by mouth At Night As Needed, Disp: 30 tablet, Rfl: 2  •  dicyclomine (BENTYL) 10 MG capsule, Take 1 capsule by mouth 3 (Three) Times a Day., Disp: 270 capsule, Rfl: 3  •  diphenhydrAMINE (Benadryl Allergy) 25 MG tablet, Take 1-2 tablets by mouth Every 4-6 Hours As Needed., Disp: 90 tablet, Rfl: 11  •  estradiol (ESTRACE) 0.1 MG/GM vaginal cream, Apply externally as directed to urethra twice weekly at bedtime., Disp: 42.5 g, Rfl: 3  •  ferrous sulfate 325 (65 FE) MG EC tablet, Take 1 tablet by mouth., Disp: , Rfl:   •  fexofenadine (ALLEGRA) 180 MG tablet, Take 1 tablet by mouth Daily., Disp: , Rfl:   •  fluticasone (FLONASE) 50 MCG/ACT nasal spray, Instill 2 sprays into each nostril daily. (Patient taking differently: 1 spray by Each Nare route Daily.), Disp: 16 g, Rfl: 11  •  furosemide  (LASIX) 40 MG tablet, Take 1 tablet by mouth Daily. Two days a month., Disp: 30 tablet, Rfl: 1  •  glucosamine-chondroitin 500-400 MG capsule capsule, Take 1 capsule by mouth 2 (Two) Times a Day With Meals., Disp: , Rfl:   •  Hypertonic Nasal Wash (Sinus Rinse Kit) pack, use once or twice daily as needed, Disp: 1 each, Rfl: 3  •  immune globulin, human, (Gammagard) 20 GM/200ML solution infusion, Infuse 40 g into a venous catheter Every 28 (Twenty-Eight) Days., Disp: 400 mL, Rfl: 0  •  ipratropium (ATROVENT) 0.06 % nasal spray, Instill 1-2 sprays each nostril 2-3 times daily as needed, Disp: 15 mL, Rfl: 3  •  ipratropium-albuterol (DUO-NEB) 0.5-2.5 mg/3 ml nebulizer, Inhale the contents of 1 vial into nebulizer every 4-6 hours as needed for cough,wheezing and shortness of breath., Disp: 90 mL, Rfl: 3  •  isosorbide mononitrate (IMDUR) 30 MG 24 hr tablet, Take 1 tablet by mouth Every Morning., Disp: 90 tablet, Rfl: 3  •  levothyroxine (SYNTHROID, LEVOTHROID) 50 MCG tablet, Take 1 tablet by mouth Daily., Disp: 90 tablet, Rfl: 3  •  Magnesium 250 MG tablet, Take 2 tablets by mouth 2 (Two) Times a Day., Disp: , Rfl:   •  montelukast (SINGULAIR) 10 MG tablet, Take 1 tablet by mouth every night at bedtime., Disp: 30 tablet, Rfl: 11  •  multivitamin with minerals tablet tablet, Take 1 tablet by mouth Daily., Disp: , Rfl:   •  omeprazole (priLOSEC) 40 MG capsule, Take 1 capsule by mouth 2 (Two) Times a Day., Disp: 180 capsule, Rfl: 3  •  ondansetron (ZOFRAN) 4 MG tablet, Take 1 tablet by mouth Every 8 (Eight) Hours As Needed for Nausea or Vomiting., Disp: 30 tablet, Rfl: 11  •  POTASSIUM CHLORIDE PO, Take 20 mEq by mouth See Admin Instructions. Takes two times a month with Lasix, Disp: , Rfl:   •  potassium citrate (UROCIT-K) 10 MEQ (1080 MG) CR tablet, Take 1 tablet by mouth Daily., Disp: 90 tablet, Rfl: 3  •  predniSONE (DELTASONE) 20 MG tablet, Take 2 tablets prior to Gammgard infusion, Disp: 6 tablet, Rfl: 3  •   predniSONE (DELTASONE) 20 MG tablet, Take 2 tablets by mouth prior to Gammgard infusion, Disp: 6 tablet, Rfl: 3  •  predniSONE (DELTASONE) 20 MG tablet, Take 2 tablets prior to Gammgard infusion, Disp: 6 tablet, Rfl: 3  •  Semaglutide (Rybelsus) 7 MG tablet, Take 1 tablet by mouth Daily., Disp: 90 tablet, Rfl: 3  •  Spacer/Aero-Holding Chambers (AeroChamber Plus Wali-Vu) misc, Use as directed with albuterol inhaler, Disp: 1 each, Rfl: 0  •  Spacer/Aero-Holding Chambers (AeroChamber Plus Wali-Vu) misc, Use as directed with inhaler, Disp: 1 each, Rfl: 0  •  tiZANidine (ZANAFLEX) 4 MG tablet, Take 1 tablet by mouth Every Night., Disp: 90 tablet, Rfl: 1  •  ubrogepant (Ubrelvy) 100 MG tablet, Take 1 tablet by mouth 1 (One) Time As Needed (migraine) for up to 1 dose., Disp: 4 tablet, Rfl: 0  •  valsartan (DIOVAN) 160 MG tablet, Take 1 tablet by mouth Daily., Disp: 90 tablet, Rfl: 3  •  vitamin B-12 (CYANOCOBALAMIN) 1000 MCG tablet, Take 1 tablet by mouth Daily., Disp: , Rfl:     Current Facility-Administered Medications:   •  heparin injection 500 Units, 5 mL, Intravenous, PRN, Sanjeev Rawls MD  •  sodium chloride 0.9 % flush 10 mL, 10 mL, Intravenous, Q12H, Sanjeev Rawls MD  •  sodium chloride 0.9 % flush 10 mL, 10 mL, Intravenous, PRN, Sanjeev Rawls MD  •  sodium chloride 0.9 % flush 20 mL, 20 mL, Intravenous, PRN, Sanjeev Rawls MD    Allergies:   Allergies   Allergen Reactions   • Cefaclor Hives and Swelling     Other reaction(s): SWELLING  Shed 4 layers of skin and extremely SOB  Shed 4 layers of skin and extremely SOB     • Cephalosporins Hives, Angioedema and Swelling     Rechallenge with cefipime caused rash on 1/14/08.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   • Verapamil Nausea And Vomiting     Dizzy   • Zolpidem Other (See Comments) and Unknown (See Comments)     Automatic behaviour in sleep.  Automatic behaviour in sleep.  confusion   • Cephalexin Rash   • Edecrin [Ethacrynic Acid] Rash and Other (See Comments)     Weight gain   • Hydrochlorothiazide Hives   • Hydrocodone-Acetaminophen GI Intolerance   • Sulfa Antibiotics Hives       Objective     Physical Exam:   Vital Signs:   Visit Vitals  Ht 162.6 cm (64.02\")   Wt 90.3 kg (199 lb)   BMI 34.14 kg/m²      Body mass index is 34.14 kg/m².     Physical exam notable for no redness or swelling at insertion site, mild bruising.    Labs  Brief Urine Lab Results  (Last result in the past 365 days)      Color   Clarity   Blood   Leuk Est   Nitrite   Protein   CREAT   Urine HCG        03/22/22 1347 Yellow  Comment: REFERENCE RANGE: Yellow, Straw   Clear   Negative   Negative   Negative   Negative                 Lab Results   Component Value Date    GLUCOSE 132 (H) 10/06/2022    CALCIUM 9.3 10/06/2022     (L) 10/06/2022    K 4.5 10/06/2022    CO2 24.4 10/06/2022    CL 99 10/06/2022    BUN 11 10/06/2022    CREATININE 1.12 (H) 10/06/2022    EGFRIFAFRI 50 (L) 06/30/2021    EGFRIFNONA 48 (L) 02/14/2022    BCR 9.8 10/06/2022    ANIONGAP 9.6 10/06/2022       Lab Results   Component Value Date    WBC 5.10 10/06/2022    HGB 12.0 10/06/2022    HCT 35.0 10/06/2022    MCV 93.6 10/06/2022     10/06/2022            Radiographic Studies  Mammo Screening Digital Tomosynthesis Bilateral With CAD    Result Date: 3/7/2023  No findings suspicious for malignancy.  ACR BI-RADS CATEGORY:  1, NEGATIVE  RECOMMENDATION: " Yearly mammogram, yearly clinical breast exam, and encourage self breast awareness.  CAD was used.  The standard false negative rate of mammography is between 10% and 25%. Complex patterns or increased breast density will markedly elevate the false negative rate of mammography.  A letter, in lay terminology, with the results of this exam will be mailed to the patient.  At our facility, a triangular marker is positioned over a palpable area of concern indicated by the patient. A [place] marker is placed over a visible skin lesion. A linear marker indicates a scar.   If there is a palpable area of concern, biopsy should be considered regardless of imaging findings.    This report was finalized on 3/7/2023 1:14 PM by Dr. Dalia Beck MD.        I have reviewed the above labs and imaging.     Assessment / Plan      Assessment/Plan:   Diagnoses and all orders for this visit:    1. Urge incontinence of urine (Primary)    67-year-old female with urge urinary incontinence has been dealing with this for 20+ years has tried and failed Ditropan and Myrbetriq with no improvement in incontinence on either medication and both of them were taken consecutively for 30 days.  With greater than 50% improvement in urinary symptoms patient wishes to proceed with full implant stage I and II InterStim.  We discussed risk associated with procedure including bleeding, infection, damage to surrounding organs, and anesthesia complications.    PNE leads removed intact and 4 x 4 dressing with paper tape placed.  Patient can return to showering as normal    1. Schedule InterStim stage I and II with Dr. Nation    Follow Up:   No follow-ups on file.    LILIBETH Cherry, NP-C  [facility] [place]

## 2023-03-23 ENCOUNTER — HOSPITAL ENCOUNTER (OUTPATIENT)
Dept: INFUSION THERAPY | Facility: HOSPITAL | Age: 68
Discharge: HOME | End: 2023-03-23
Admitting: ALLERGY & IMMUNOLOGY
Payer: MEDICARE

## 2023-03-23 VITALS
RESPIRATION RATE: 20 BRPM | DIASTOLIC BLOOD PRESSURE: 89 MMHG | TEMPERATURE: 98.7 F | HEART RATE: 84 BPM | WEIGHT: 201 LBS | BODY MASS INDEX: 34.48 KG/M2 | SYSTOLIC BLOOD PRESSURE: 143 MMHG | OXYGEN SATURATION: 97 %

## 2023-03-23 DIAGNOSIS — D80.1 COMMON VARIABLE AGAMMAGLOBULINEMIA: Primary | ICD-10-CM

## 2023-03-23 DIAGNOSIS — Z95.828 PORT-A-CATH IN PLACE: ICD-10-CM

## 2023-03-23 PROBLEM — N39.41 URGE INCONTINENCE OF URINE: Status: ACTIVE | Noted: 2023-03-23

## 2023-03-23 LAB
ALBUMIN SERPL-MCNC: 4 G/DL (ref 3.5–5.2)
ALBUMIN/GLOB SERPL: 1.4 G/DL
ALP SERPL-CCNC: 61 U/L (ref 39–117)
ALT SERPL W P-5'-P-CCNC: 20 U/L (ref 1–33)
ANION GAP SERPL CALCULATED.3IONS-SCNC: 9.6 MMOL/L (ref 5–15)
AST SERPL-CCNC: 26 U/L (ref 1–32)
BASOPHILS # BLD AUTO: 0.06 10*3/MM3 (ref 0–0.2)
BASOPHILS NFR BLD AUTO: 1.5 % (ref 0–1.5)
BILIRUB SERPL-MCNC: 0.4 MG/DL (ref 0–1.2)
BUN SERPL-MCNC: 18 MG/DL (ref 8–23)
BUN/CREAT SERPL: 15 (ref 7–25)
CALCIUM SPEC-SCNC: 8.9 MG/DL (ref 8.6–10.5)
CHLORIDE SERPL-SCNC: 98 MMOL/L (ref 98–107)
CO2 SERPL-SCNC: 24.4 MMOL/L (ref 22–29)
CREAT SERPL-MCNC: 1.2 MG/DL (ref 0.57–1)
DEPRECATED RDW RBC AUTO: 43.5 FL (ref 37–54)
EGFRCR SERPLBLD CKD-EPI 2021: 49.7 ML/MIN/1.73
EOSINOPHIL # BLD AUTO: 0.18 10*3/MM3 (ref 0–0.4)
EOSINOPHIL NFR BLD AUTO: 4.6 % (ref 0.3–6.2)
ERYTHROCYTE [DISTWIDTH] IN BLOOD BY AUTOMATED COUNT: 12.6 % (ref 12.3–15.4)
GLOBULIN UR ELPH-MCNC: 2.9 GM/DL
GLUCOSE SERPL-MCNC: 109 MG/DL (ref 65–99)
HCT VFR BLD AUTO: 37 % (ref 34–46.6)
HGB BLD-MCNC: 13.1 G/DL (ref 12–15.9)
IGG1 SER-MCNC: 1163 MG/DL (ref 700–1600)
IMM GRANULOCYTES # BLD AUTO: 0.02 10*3/MM3 (ref 0–0.05)
IMM GRANULOCYTES NFR BLD AUTO: 0.5 % (ref 0–0.5)
LYMPHOCYTES # BLD AUTO: 1.13 10*3/MM3 (ref 0.7–3.1)
LYMPHOCYTES NFR BLD AUTO: 29.1 % (ref 19.6–45.3)
MCH RBC QN AUTO: 33 PG (ref 26.6–33)
MCHC RBC AUTO-ENTMCNC: 35.4 G/DL (ref 31.5–35.7)
MCV RBC AUTO: 93.2 FL (ref 79–97)
MONOCYTES # BLD AUTO: 0.35 10*3/MM3 (ref 0.1–0.9)
MONOCYTES NFR BLD AUTO: 9 % (ref 5–12)
NEUTROPHILS NFR BLD AUTO: 2.14 10*3/MM3 (ref 1.7–7)
NEUTROPHILS NFR BLD AUTO: 55.3 % (ref 42.7–76)
NRBC BLD AUTO-RTO: 0 /100 WBC (ref 0–0.2)
PLATELET # BLD AUTO: 229 10*3/MM3 (ref 140–450)
PMV BLD AUTO: 9 FL (ref 6–12)
POTASSIUM SERPL-SCNC: 4.4 MMOL/L (ref 3.5–5.2)
PROT SERPL-MCNC: 6.9 G/DL (ref 6–8.5)
RBC # BLD AUTO: 3.97 10*6/MM3 (ref 3.77–5.28)
SODIUM SERPL-SCNC: 132 MMOL/L (ref 136–145)
WBC NRBC COR # BLD: 3.88 10*3/MM3 (ref 3.4–10.8)

## 2023-03-23 PROCEDURE — 96361 HYDRATE IV INFUSION ADD-ON: CPT

## 2023-03-23 PROCEDURE — 25010000002 IMMUNE GLOBULIN (HUMAN) 20 GM/200ML SOLUTION: Performed by: ALLERGY & IMMUNOLOGY

## 2023-03-23 PROCEDURE — 96366 THER/PROPH/DIAG IV INF ADDON: CPT

## 2023-03-23 PROCEDURE — 85025 COMPLETE CBC W/AUTO DIFF WBC: CPT | Performed by: ALLERGY & IMMUNOLOGY

## 2023-03-23 PROCEDURE — 82784 ASSAY IGA/IGD/IGG/IGM EACH: CPT | Performed by: ALLERGY & IMMUNOLOGY

## 2023-03-23 PROCEDURE — 25010000002 HEPARIN LOCK FLUSH PER 10 UNITS: Performed by: ALLERGY & IMMUNOLOGY

## 2023-03-23 PROCEDURE — 96365 THER/PROPH/DIAG IV INF INIT: CPT

## 2023-03-23 PROCEDURE — 80053 COMPREHEN METABOLIC PANEL: CPT | Performed by: ALLERGY & IMMUNOLOGY

## 2023-03-23 RX ORDER — PREDNISONE 20 MG/1
40 TABLET ORAL ONCE AS NEEDED
Start: 2023-04-20

## 2023-03-23 RX ORDER — EPINEPHRINE 1 MG/ML
0.3 INJECTION, SOLUTION INTRAMUSCULAR; SUBCUTANEOUS ONCE AS NEEDED
Status: DISCONTINUED | OUTPATIENT
Start: 2023-03-23 | End: 2023-03-25 | Stop reason: HOSPADM

## 2023-03-23 RX ORDER — PREDNISONE 20 MG/1
40 TABLET ORAL ONCE
Status: DISCONTINUED | OUTPATIENT
Start: 2023-03-23 | End: 2023-03-25 | Stop reason: HOSPADM

## 2023-03-23 RX ORDER — PREDNISONE 20 MG/1
40 TABLET ORAL ONCE
Start: 2023-04-20 | End: 2023-04-20

## 2023-03-23 RX ORDER — SODIUM CHLORIDE 0.9 % (FLUSH) 0.9 %
10 SYRINGE (ML) INJECTION AS NEEDED
OUTPATIENT
Start: 2023-03-23

## 2023-03-23 RX ORDER — METHYLPREDNISOLONE SODIUM SUCCINATE 125 MG/2ML
50 INJECTION, POWDER, LYOPHILIZED, FOR SOLUTION INTRAMUSCULAR; INTRAVENOUS ONCE AS NEEDED
Start: 2023-04-20

## 2023-03-23 RX ORDER — DIPHENHYDRAMINE HCL 25 MG
50 CAPSULE ORAL ONCE AS NEEDED
Start: 2023-04-20

## 2023-03-23 RX ORDER — SODIUM CHLORIDE 0.9 % (FLUSH) 0.9 %
10 SYRINGE (ML) INJECTION AS NEEDED
Status: DISCONTINUED | OUTPATIENT
Start: 2023-03-23 | End: 2023-03-25 | Stop reason: HOSPADM

## 2023-03-23 RX ORDER — ACETAMINOPHEN 325 MG/1
325 TABLET ORAL ONCE
Status: DISCONTINUED | OUTPATIENT
Start: 2023-03-23 | End: 2023-03-25 | Stop reason: HOSPADM

## 2023-03-23 RX ORDER — ACETAMINOPHEN 325 MG/1
325 TABLET ORAL ONCE
OUTPATIENT
Start: 2023-04-20

## 2023-03-23 RX ORDER — DIPHENHYDRAMINE HCL 25 MG
50 CAPSULE ORAL ONCE AS NEEDED
Status: DISCONTINUED | OUTPATIENT
Start: 2023-03-23 | End: 2023-03-25 | Stop reason: HOSPADM

## 2023-03-23 RX ORDER — DIPHENHYDRAMINE HCL 25 MG
50 CAPSULE ORAL ONCE
Status: DISCONTINUED | OUTPATIENT
Start: 2023-03-23 | End: 2023-03-25 | Stop reason: HOSPADM

## 2023-03-23 RX ORDER — PREDNISONE 20 MG/1
40 TABLET ORAL ONCE AS NEEDED
Status: DISCONTINUED | OUTPATIENT
Start: 2023-03-23 | End: 2023-03-25 | Stop reason: HOSPADM

## 2023-03-23 RX ORDER — DIPHENHYDRAMINE HCL 25 MG
50 CAPSULE ORAL ONCE
Start: 2023-04-20 | End: 2023-04-20

## 2023-03-23 RX ORDER — EPINEPHRINE 1 MG/ML
0.3 INJECTION, SOLUTION INTRAMUSCULAR; SUBCUTANEOUS ONCE AS NEEDED
Start: 2023-04-20

## 2023-03-23 RX ORDER — METHYLPREDNISOLONE SODIUM SUCCINATE 125 MG/2ML
50 INJECTION, POWDER, LYOPHILIZED, FOR SOLUTION INTRAMUSCULAR; INTRAVENOUS ONCE AS NEEDED
Status: DISCONTINUED | OUTPATIENT
Start: 2023-03-23 | End: 2023-03-25 | Stop reason: HOSPADM

## 2023-03-23 RX ORDER — HEPARIN SODIUM (PORCINE) LOCK FLUSH IV SOLN 100 UNIT/ML 100 UNIT/ML
500 SOLUTION INTRAVENOUS AS NEEDED
Status: DISCONTINUED | OUTPATIENT
Start: 2023-03-23 | End: 2023-03-25 | Stop reason: HOSPADM

## 2023-03-23 RX ORDER — HEPARIN SODIUM (PORCINE) LOCK FLUSH IV SOLN 100 UNIT/ML 100 UNIT/ML
500 SOLUTION INTRAVENOUS AS NEEDED
OUTPATIENT
Start: 2023-03-23

## 2023-03-23 RX ADMIN — SODIUM CHLORIDE 500 ML: 9 INJECTION, SOLUTION INTRAVENOUS at 08:54

## 2023-03-23 RX ADMIN — Medication 500 UNITS: at 12:54

## 2023-03-23 RX ADMIN — IMMUNE GLOBULIN INFUSION (HUMAN) 20 G: 100 INJECTION, SOLUTION INTRAVENOUS; SUBCUTANEOUS at 11:20

## 2023-03-23 RX ADMIN — IMMUNE GLOBULIN INFUSION (HUMAN) 20 G: 100 INJECTION, SOLUTION INTRAVENOUS; SUBCUTANEOUS at 09:36

## 2023-03-23 RX ADMIN — Medication 10 ML: at 12:54

## 2023-03-29 ENCOUNTER — OFFICE VISIT (OUTPATIENT)
Dept: INTERNAL MEDICINE | Facility: CLINIC | Age: 68
End: 2023-03-29
Payer: MEDICARE

## 2023-03-29 VITALS
OXYGEN SATURATION: 95 % | HEART RATE: 85 BPM | BODY MASS INDEX: 34.66 KG/M2 | TEMPERATURE: 97.5 F | DIASTOLIC BLOOD PRESSURE: 88 MMHG | SYSTOLIC BLOOD PRESSURE: 130 MMHG | WEIGHT: 203 LBS | HEIGHT: 64 IN | RESPIRATION RATE: 16 BRPM

## 2023-03-29 DIAGNOSIS — G89.29 CHRONIC RIGHT SHOULDER PAIN: ICD-10-CM

## 2023-03-29 DIAGNOSIS — Z79.4 TYPE 2 DIABETES MELLITUS WITHOUT COMPLICATION, WITH LONG-TERM CURRENT USE OF INSULIN: ICD-10-CM

## 2023-03-29 DIAGNOSIS — G47.33 OBSTRUCTIVE SLEEP APNEA (ADULT) (PEDIATRIC): ICD-10-CM

## 2023-03-29 DIAGNOSIS — M54.50 CHRONIC BILATERAL LOW BACK PAIN WITHOUT SCIATICA: ICD-10-CM

## 2023-03-29 DIAGNOSIS — G89.29 CHRONIC BILATERAL LOW BACK PAIN WITHOUT SCIATICA: ICD-10-CM

## 2023-03-29 DIAGNOSIS — E11.9 TYPE 2 DIABETES MELLITUS WITHOUT COMPLICATION, WITH LONG-TERM CURRENT USE OF INSULIN: ICD-10-CM

## 2023-03-29 DIAGNOSIS — F41.9 ANXIETY: ICD-10-CM

## 2023-03-29 DIAGNOSIS — M25.511 CHRONIC RIGHT SHOULDER PAIN: ICD-10-CM

## 2023-03-29 RX ORDER — ACETAMINOPHEN AND CODEINE PHOSPHATE 300; 30 MG/1; MG/1
1 TABLET ORAL EVERY 4 HOURS PRN
Qty: 10 TABLET | Refills: 0 | Status: ON HOLD | OUTPATIENT
Start: 2023-03-29

## 2023-03-29 RX ORDER — DICYCLOMINE HYDROCHLORIDE 10 MG/1
10 CAPSULE ORAL 3 TIMES DAILY
Qty: 270 CAPSULE | Refills: 3 | Status: ON HOLD | OUTPATIENT
Start: 2023-03-29

## 2023-03-29 RX ORDER — CLONAZEPAM 0.5 MG/1
0.5 TABLET ORAL NIGHTLY PRN
Qty: 30 TABLET | Refills: 2 | Status: ON HOLD | OUTPATIENT
Start: 2023-03-29

## 2023-03-29 NOTE — PROGRESS NOTES
Subjective     Patient ID: Raquel Mariee is a 67 y.o. female. Patient is here for management of multiple medical problems.     Chief Complaint   Patient presents with   • Sinusitis   • Asthma     History of Present Illness     Pt with cystocele and rectocele.  Bladder stimulator went well. Going for permeant devise soon.    Going for surgical eval for the other issues first.        The following portions of the patient's history were reviewed and updated as appropriate: allergies, current medications, past family history, past medical history, past social history, past surgical history and problem list.    Review of Systems    Current Outpatient Medications:   •  acetaminophen-codeine (TYLENOL #3) 300-30 MG per tablet, Take 1 tablet by mouth Every 4 (Four) Hours As Needed for Moderate Pain., Disp: 10 tablet, Rfl: 0  •  albuterol sulfate  (90 Base) MCG/ACT inhaler, Inhale 2 puffs by mouth every 4 to 6 hours as needed for cough, wheeze, shortness of breath. Use with vortex spacer, Disp: 8.5 g, Rfl: 3  •  allopurinol (Zyloprim) 100 MG tablet, Take 1 tablet by mouth Daily., Disp: 90 tablet, Rfl: 3  •  betamethasone valerate (VALISONE) 0.1 % cream, Apply topically to the appropriate area as directed Daily., Disp: 45 g, Rfl: 11  •  Budeson-Glycopyrrol-Formoterol (Breztri Aerosphere) 160-9-4.8 MCG/ACT aerosol inhaler, Inhale 2 puffs by mouth twice daily. Rinse mouth after use, Disp: 10.7 g, Rfl: 11  •  buPROPion SR (WELLBUTRIN SR) 150 MG 12 hr tablet, Take 1 tablet by mouth 2 (Two) Times a Day., Disp: 180 tablet, Rfl: 3  •  calcium carbonate (Antacid Maximum) 1000 MG chewable tablet, Chew 500 mg 3 (Three) Times a Day., Disp: , Rfl:   •  cholecalciferol (VITAMIN D3) 25 MCG (1000 UT) tablet, Take 1 tablet by mouth Daily., Disp: , Rfl:   •  clonazePAM (KlonoPIN) 0.5 MG tablet, Take 1 tablet by mouth At Night As Needed, Disp: 30 tablet, Rfl: 2  •  dicyclomine (BENTYL) 10 MG capsule, Take 1 capsule by mouth 3  (Three) Times a Day., Disp: 270 capsule, Rfl: 3  •  diphenhydrAMINE (Benadryl Allergy) 25 MG tablet, Take 1-2 tablets by mouth Every 4-6 Hours As Needed., Disp: 90 tablet, Rfl: 11  •  estradiol (ESTRACE) 0.1 MG/GM vaginal cream, Apply externally as directed to urethra twice weekly at bedtime., Disp: 42.5 g, Rfl: 3  •  ferrous sulfate 325 (65 FE) MG EC tablet, Take 1 tablet by mouth., Disp: , Rfl:   •  fexofenadine (ALLEGRA) 180 MG tablet, Take 1 tablet by mouth Daily., Disp: , Rfl:   •  fluticasone (FLONASE) 50 MCG/ACT nasal spray, Instill 2 sprays into each nostril daily. (Patient taking differently: 1 spray by Each Nare route Daily.), Disp: 16 g, Rfl: 11  •  furosemide (LASIX) 40 MG tablet, Take 1 tablet by mouth Daily. Two days a month., Disp: 30 tablet, Rfl: 1  •  glucosamine-chondroitin 500-400 MG capsule capsule, Take 1 capsule by mouth 2 (Two) Times a Day With Meals., Disp: , Rfl:   •  Hypertonic Nasal Wash (Sinus Rinse Kit) pack, use once or twice daily as needed, Disp: 1 each, Rfl: 3  •  immune globulin, human, (Gammagard) 20 GM/200ML solution infusion, Infuse 40 g into a venous catheter Every 28 (Twenty-Eight) Days., Disp: 400 mL, Rfl: 0  •  ipratropium (ATROVENT) 0.06 % nasal spray, Instill 1-2 sprays each nostril 2-3 times daily as needed, Disp: 15 mL, Rfl: 3  •  ipratropium-albuterol (DUO-NEB) 0.5-2.5 mg/3 ml nebulizer, Inhale the contents of 1 vial into nebulizer every 4-6 hours as needed for cough,wheezing and shortness of breath., Disp: 90 mL, Rfl: 3  •  isosorbide mononitrate (IMDUR) 30 MG 24 hr tablet, Take 1 tablet by mouth Every Morning., Disp: 90 tablet, Rfl: 3  •  levothyroxine (SYNTHROID, LEVOTHROID) 50 MCG tablet, Take 1 tablet by mouth Daily., Disp: 90 tablet, Rfl: 3  •  Magnesium 250 MG tablet, Take 2 tablets by mouth 2 (Two) Times a Day., Disp: , Rfl:   •  montelukast (SINGULAIR) 10 MG tablet, Take 1 tablet by mouth every night at bedtime., Disp: 30 tablet, Rfl: 11  •  multivitamin with  minerals tablet tablet, Take 1 tablet by mouth Daily., Disp: , Rfl:   •  omeprazole (priLOSEC) 40 MG capsule, Take 1 capsule by mouth 2 (Two) Times a Day., Disp: 180 capsule, Rfl: 3  •  ondansetron (ZOFRAN) 4 MG tablet, Take 1 tablet by mouth Every 8 (Eight) Hours As Needed for Nausea or Vomiting., Disp: 30 tablet, Rfl: 11  •  POTASSIUM CHLORIDE PO, Take 20 mEq by mouth See Admin Instructions. Takes two times a month with Lasix, Disp: , Rfl:   •  potassium citrate (UROCIT-K) 10 MEQ (1080 MG) CR tablet, Take 1 tablet by mouth Daily., Disp: 90 tablet, Rfl: 3  •  predniSONE (DELTASONE) 20 MG tablet, Take 2 tablets prior to Gammgard infusion, Disp: 6 tablet, Rfl: 3  •  predniSONE (DELTASONE) 20 MG tablet, Take 2 tablets by mouth prior to Gammgard infusion, Disp: 6 tablet, Rfl: 3  •  predniSONE (DELTASONE) 20 MG tablet, Take 2 tablets prior to Gammgard infusion, Disp: 6 tablet, Rfl: 3  •  Semaglutide (Rybelsus) 7 MG tablet, Take 1 tablet by mouth Daily., Disp: 90 tablet, Rfl: 3  •  Spacer/Aero-Holding Chambers (AeroChamber Plus Wali-Vu) misc, Use as directed with albuterol inhaler, Disp: 1 each, Rfl: 0  •  Spacer/Aero-Holding Chambers (AeroChamber Plus Wali-Vu) misc, Use as directed with inhaler, Disp: 1 each, Rfl: 0  •  tiZANidine (ZANAFLEX) 4 MG tablet, Take 1 tablet by mouth Every Night., Disp: 90 tablet, Rfl: 1  •  ubrogepant (Ubrelvy) 100 MG tablet, Take 1 tablet by mouth 1 (One) Time As Needed (migraine) for up to 1 dose., Disp: 4 tablet, Rfl: 0  •  valsartan (DIOVAN) 160 MG tablet, Take 1 tablet by mouth Daily., Disp: 90 tablet, Rfl: 3  •  vitamin B-12 (CYANOCOBALAMIN) 1000 MCG tablet, Take 1 tablet by mouth Daily., Disp: , Rfl:     Current Facility-Administered Medications:   •  heparin injection 500 Units, 5 mL, Intravenous, PRN, Sanjeev Rawls MD  •  sodium chloride 0.9 % flush 10 mL, 10 mL, Intravenous, Q12H, Sanjeev Ralws MD  •  sodium chloride 0.9 % flush 10 mL, 10 mL, Intravenous, PRN, Sanjeev Rawls  "MD [place]  •  sodium chloride 0.9 % flush 20 mL, 20 mL, Intravenous, PRN, [place], Sanjeev DELAROSA MD    Objective      Blood pressure 130/88, pulse 85, temperature 97.5 °F (36.4 °C), resp. rate 16, height 162.6 cm (64.02\"), weight 92.1 kg (203 lb), SpO2 95 %, not currently breastfeeding.    Physical Exam     General Appearance:    Alert, cooperative, no distress, appears stated age   Head:    Normocephalic, without obvious abnormality, atraumatic   Eyes:    PERRL, conjunctiva/corneas clear, EOM's intact   Ears:    Normal TM's and external ear canals, both ears   Nose:   Nares normal, septum midline, mucosa normal, no drainage   or sinus tenderness   Throat:   Lips, mucosa, and tongue normal; teeth and gums normal   Neck:   Supple, symmetrical, trachea midline, no adenopathy;        thyroid:  No enlargement/tenderness/nodules; no carotid    bruit or JVD   Back:     Symmetric, no curvature, ROM normal, no CVA tenderness   Lungs:     Clear to auscultation bilaterally, respirations unlabored   Chest wall:    No tenderness or deformity   Heart:    Regular rate and rhythm, S1 and S2 normal, no murmur,        rub or gallop   Abdomen:     Soft, non-tender, bowel sounds active all four quadrants,     no masses, no organomegaly   Extremities:   Extremities normal, atraumatic, no cyanosis or edema   Pulses:   2+ and symmetric all extremities   Skin:   Skin color, texture, turgor normal, no rashes or lesions   Lymph nodes:   Cervical, supraclavicular, and axillary nodes normal   Neurologic:   CNII-XII intact. Normal strength, sensation and reflexes       throughout      Results for orders placed or performed during the hospital encounter of 03/23/23   IgG    Specimen: Blood   Result Value Ref Range    IgG 1,163 700 - 1,600 mg/dL   Comprehensive Metabolic Panel    Specimen: Blood   Result Value Ref Range    Glucose 109 (H) 65 - 99 mg/dL    BUN 18 8 - 23 mg/dL    Creatinine 1.20 (H) 0.57 - 1.00 mg/dL    Sodium 132 (L) 136 - 145 mmol/L    " Potassium 4.4 3.5 - 5.2 mmol/L    Chloride 98 98 - 107 mmol/L    CO2 24.4 22.0 - 29.0 mmol/L    Calcium 8.9 8.6 - 10.5 mg/dL    Total Protein 6.9 6.0 - 8.5 g/dL    Albumin 4.0 3.5 - 5.2 g/dL    ALT (SGPT) 20 1 - 33 U/L    AST (SGOT) 26 1 - 32 U/L    Alkaline Phosphatase 61 39 - 117 U/L    Total Bilirubin 0.4 0.0 - 1.2 mg/dL    Globulin 2.9 gm/dL    A/G Ratio 1.4 g/dL    BUN/Creatinine Ratio 15.0 7.0 - 25.0    Anion Gap 9.6 5.0 - 15.0 mmol/L    eGFR 49.7 (L) >60.0 mL/min/1.73   CBC Auto Differential    Specimen: Blood   Result Value Ref Range    WBC 3.88 3.40 - 10.80 10*3/mm3    RBC 3.97 3.77 - 5.28 10*6/mm3    Hemoglobin 13.1 12.0 - 15.9 g/dL    Hematocrit 37.0 34.0 - 46.6 %    MCV 93.2 79.0 - 97.0 fL    MCH 33.0 26.6 - 33.0 pg    MCHC 35.4 31.5 - 35.7 g/dL    RDW 12.6 12.3 - 15.4 %    RDW-SD 43.5 37.0 - 54.0 fl    MPV 9.0 6.0 - 12.0 fL    Platelets 229 140 - 450 10*3/mm3    Neutrophil % 55.3 42.7 - 76.0 %    Lymphocyte % 29.1 19.6 - 45.3 %    Monocyte % 9.0 5.0 - 12.0 %    Eosinophil % 4.6 0.3 - 6.2 %    Basophil % 1.5 0.0 - 1.5 %    Immature Grans % 0.5 0.0 - 0.5 %    Neutrophils, Absolute 2.14 1.70 - 7.00 10*3/mm3    Lymphocytes, Absolute 1.13 0.70 - 3.10 10*3/mm3    Monocytes, Absolute 0.35 0.10 - 0.90 10*3/mm3    Eosinophils, Absolute 0.18 0.00 - 0.40 10*3/mm3    Basophils, Absolute 0.06 0.00 - 0.20 10*3/mm3    Immature Grans, Absolute 0.02 0.00 - 0.05 10*3/mm3    nRBC 0.0 0.0 - 0.2 /100 WBC         Assessment & Plan     Pt will ask Dr Flower to see if she may need to wait on bladder stimulator as surgical correction may help.    Pt sees Dr Garza will discuss infusions as cost is now $1000. A month.    Wt is stable. rybelsus at 7 mg    Pt constipated.   Using miralx daily with stool softener.    Pt needs barry reeval. Pt has lost wt.    Inspire not an option.   Pt willing to treat if needed.    Using clonazapam at night   Pt asking for rf of T#3 take very infrequent.  rf #10.      Acute renal insuff. will  hydrated and recheck.   Diagnoses and all orders for this visit:    1. Type 2 diabetes mellitus without complication, with long-term current use of insulin (HCC)  -     Basic metabolic panel  -     Hemoglobin A1c  -     Home Sleep Study  -     acetaminophen-codeine (TYLENOL #3) 300-30 MG per tablet; Take 1 tablet by mouth Every 4 (Four) Hours As Needed for Moderate Pain.  Dispense: 10 tablet; Refill: 0    2. Anxiety  -     clonazePAM (KlonoPIN) 0.5 MG tablet; Take 1 tablet by mouth At Night As Needed  Dispense: 30 tablet; Refill: 2  -     Basic metabolic panel  -     Hemoglobin A1c  -     Home Sleep Study    3. Obstructive sleep apnea (adult) (pediatric)  -     Home Sleep Study    4. Chronic right shoulder pain  -     acetaminophen-codeine (TYLENOL #3) 300-30 MG per tablet; Take 1 tablet by mouth Every 4 (Four) Hours As Needed for Moderate Pain.  Dispense: 10 tablet; Refill: 0    5. Chronic bilateral low back pain without sciatica  -     acetaminophen-codeine (TYLENOL #3) 300-30 MG per tablet; Take 1 tablet by mouth Every 4 (Four) Hours As Needed for Moderate Pain.  Dispense: 10 tablet; Refill: 0    Other orders  -     dicyclomine (BENTYL) 10 MG capsule; Take 1 capsule by mouth 3 (Three) Times a Day.  Dispense: 270 capsule; Refill: 3      Return in about 3 months (around 6/29/2023).          There are no Patient Instructions on file for this visit.     Sanjeev Rawls MD    Assessment & Plan

## 2023-04-06 ENCOUNTER — TELEPHONE (OUTPATIENT)
Dept: UROLOGY | Facility: CLINIC | Age: 68
End: 2023-04-06
Payer: MEDICARE

## 2023-04-06 NOTE — TELEPHONE ENCOUNTER
Provider: COLETTE BASSETT  Caller: Raquel Mariee  Relationship to Patient: SELF     Phone Number: [phone]  Reason for Call: PT HAS A REFERRAL TO DR LADD BUT HER INITIAL APPT WAS SCHEDULED  WITH THE WRONG DR LADD, PT WAS WANTING TO GET SCHEDULED WITH THE CORRECT DR LADD BUT THEY STATE SHE NEEDS A PRE-AUTH FROM [facility] TO BE SCHEDULED.     PT IS ALSO TO HAVE A SURGERY ON 4-14-23 WITH DR ALEXANDER, PT STATING INSURANCE HASN'T GOTTEN A REFERRAL/APPROVAL FOR THIS PROCEDURE.    PLEASE GIVE PT A CALL TO DISCUSS.

## 2023-04-06 NOTE — TELEPHONE ENCOUNTER
I called patient back and explained the referral was sent to Dr. Kellen Galan (gyn) and records were sent as well. It was not sent to Jitendra Galan. I suggesting the patient call the office and make an appt with her.     I explained surgery auths for [facility] OR is obtained by [facility] and gave her the number to reach auth department.

## 2023-04-08 ENCOUNTER — APPOINTMENT (OUTPATIENT)
Dept: GENERAL RADIOLOGY | Facility: HOSPITAL | Age: 68
End: 2023-04-08
Payer: MEDICARE

## 2023-04-08 ENCOUNTER — HOSPITAL ENCOUNTER (OUTPATIENT)
Facility: HOSPITAL | Age: 68
Setting detail: OBSERVATION
Discharge: HOME | End: 2023-04-10
Attending: EMERGENCY MEDICINE | Admitting: INTERNAL MEDICINE
Payer: MEDICARE

## 2023-04-08 DIAGNOSIS — J44.1 ACUTE EXACERBATION OF CHRONIC OBSTRUCTIVE PULMONARY DISEASE (COPD): Primary | ICD-10-CM

## 2023-04-08 DIAGNOSIS — R06.89 ACUTE RESPIRATORY INSUFFICIENCY: ICD-10-CM

## 2023-04-08 LAB
ALBUMIN SERPL-MCNC: 4 G/DL (ref 3.5–5.2)
ALBUMIN/GLOB SERPL: 1.4 G/DL
ALP SERPL-CCNC: 61 U/L (ref 39–117)
ALT SERPL W P-5'-P-CCNC: 21 U/L (ref 1–33)
ANION GAP SERPL CALCULATED.3IONS-SCNC: 8.9 MMOL/L (ref 5–15)
AST SERPL-CCNC: 28 U/L (ref 1–32)
B PARAPERT DNA SPEC QL NAA+PROBE: NOT DETECTED
B PERT DNA SPEC QL NAA+PROBE: NOT DETECTED
BASOPHILS # BLD AUTO: 0.05 10*3/MM3 (ref 0–0.2)
BASOPHILS NFR BLD AUTO: 0.8 % (ref 0–1.5)
BILIRUB SERPL-MCNC: 0.3 MG/DL (ref 0–1.2)
BUN SERPL-MCNC: 13 MG/DL (ref 8–23)
BUN/CREAT SERPL: 12 (ref 7–25)
C PNEUM DNA NPH QL NAA+NON-PROBE: NOT DETECTED
CALCIUM SPEC-SCNC: 8.7 MG/DL (ref 8.6–10.5)
CHLORIDE SERPL-SCNC: 101 MMOL/L (ref 98–107)
CO2 SERPL-SCNC: 26.1 MMOL/L (ref 22–29)
CREAT SERPL-MCNC: 1.08 MG/DL (ref 0.57–1)
DEPRECATED RDW RBC AUTO: 44 FL (ref 37–54)
EGFRCR SERPLBLD CKD-EPI 2021: 56.4 ML/MIN/1.73
EOSINOPHIL # BLD AUTO: 0.01 10*3/MM3 (ref 0–0.4)
EOSINOPHIL NFR BLD AUTO: 0.2 % (ref 0.3–6.2)
ERYTHROCYTE [DISTWIDTH] IN BLOOD BY AUTOMATED COUNT: 12.8 % (ref 12.3–15.4)
FLUAV SUBTYP SPEC NAA+PROBE: NOT DETECTED
FLUAV SUBTYP SPEC NAA+PROBE: NOT DETECTED
FLUBV RNA ISLT QL NAA+PROBE: NOT DETECTED
FLUBV RNA ISLT QL NAA+PROBE: NOT DETECTED
GEN 5 2HR TROPONIN T REFLEX: 17 NG/L
GLOBULIN UR ELPH-MCNC: 2.8 GM/DL
GLUCOSE SERPL-MCNC: 95 MG/DL (ref 65–99)
HADV DNA SPEC NAA+PROBE: NOT DETECTED
HCOV 229E RNA SPEC QL NAA+PROBE: NOT DETECTED
HCOV HKU1 RNA SPEC QL NAA+PROBE: NOT DETECTED
HCOV NL63 RNA SPEC QL NAA+PROBE: NOT DETECTED
HCOV OC43 RNA SPEC QL NAA+PROBE: NOT DETECTED
HCT VFR BLD AUTO: 34.5 % (ref 34–46.6)
HGB BLD-MCNC: 12.1 G/DL (ref 12–15.9)
HMPV RNA NPH QL NAA+NON-PROBE: NOT DETECTED
HOLD SPECIMEN: NORMAL
HOLD SPECIMEN: NORMAL
HPIV1 RNA ISLT QL NAA+PROBE: NOT DETECTED
HPIV2 RNA SPEC QL NAA+PROBE: NOT DETECTED
HPIV3 RNA NPH QL NAA+PROBE: DETECTED
HPIV4 P GENE NPH QL NAA+PROBE: NOT DETECTED
IMM GRANULOCYTES # BLD AUTO: 0.02 10*3/MM3 (ref 0–0.05)
IMM GRANULOCYTES NFR BLD AUTO: 0.3 % (ref 0–0.5)
LYMPHOCYTES # BLD AUTO: 1.28 10*3/MM3 (ref 0.7–3.1)
LYMPHOCYTES NFR BLD AUTO: 21.4 % (ref 19.6–45.3)
M PNEUMO IGG SER IA-ACNC: NOT DETECTED
MCH RBC QN AUTO: 32.5 PG (ref 26.6–33)
MCHC RBC AUTO-ENTMCNC: 35.1 G/DL (ref 31.5–35.7)
MCV RBC AUTO: 92.7 FL (ref 79–97)
MONOCYTES # BLD AUTO: 0.56 10*3/MM3 (ref 0.1–0.9)
MONOCYTES NFR BLD AUTO: 9.4 % (ref 5–12)
NEUTROPHILS NFR BLD AUTO: 4.05 10*3/MM3 (ref 1.7–7)
NEUTROPHILS NFR BLD AUTO: 67.9 % (ref 42.7–76)
NRBC BLD AUTO-RTO: 0 /100 WBC (ref 0–0.2)
NT-PROBNP SERPL-MCNC: 688.5 PG/ML (ref 0–900)
PLATELET # BLD AUTO: 191 10*3/MM3 (ref 140–450)
PMV BLD AUTO: 9 FL (ref 6–12)
POTASSIUM SERPL-SCNC: 3.8 MMOL/L (ref 3.5–5.2)
PROT SERPL-MCNC: 6.8 G/DL (ref 6–8.5)
RBC # BLD AUTO: 3.72 10*6/MM3 (ref 3.77–5.28)
RHINOVIRUS RNA SPEC NAA+PROBE: NOT DETECTED
RSV RNA NPH QL NAA+NON-PROBE: NOT DETECTED
SARS-COV-2 RNA NPH QL NAA+NON-PROBE: NOT DETECTED
SARS-COV-2 RNA RESP QL NAA+PROBE: NOT DETECTED
SODIUM SERPL-SCNC: 136 MMOL/L (ref 136–145)
TROPONIN T DELTA: -2 NG/L
TROPONIN T SERPL HS-MCNC: 19 NG/L
TROPONIN T SERPL HS-MCNC: 19 NG/L
WBC NRBC COR # BLD: 5.97 10*3/MM3 (ref 3.4–10.8)
WHOLE BLOOD HOLD COAG: NORMAL
WHOLE BLOOD HOLD SPECIMEN: NORMAL

## 2023-04-08 PROCEDURE — 96375 TX/PRO/DX INJ NEW DRUG ADDON: CPT

## 2023-04-08 PROCEDURE — 84484 ASSAY OF TROPONIN QUANT: CPT | Performed by: EMERGENCY MEDICINE

## 2023-04-08 PROCEDURE — 93005 ELECTROCARDIOGRAM TRACING: CPT | Performed by: EMERGENCY MEDICINE

## 2023-04-08 PROCEDURE — 80053 COMPREHEN METABOLIC PANEL: CPT | Performed by: EMERGENCY MEDICINE

## 2023-04-08 PROCEDURE — G0378 HOSPITAL OBSERVATION PER HR: HCPCS

## 2023-04-08 PROCEDURE — 25010000002 METHYLPREDNISOLONE PER 125 MG: Performed by: EMERGENCY MEDICINE

## 2023-04-08 PROCEDURE — 94761 N-INVAS EAR/PLS OXIMETRY MLT: CPT

## 2023-04-08 PROCEDURE — 96372 THER/PROPH/DIAG INJ SC/IM: CPT

## 2023-04-08 PROCEDURE — 85025 COMPLETE CBC W/AUTO DIFF WBC: CPT | Performed by: EMERGENCY MEDICINE

## 2023-04-08 PROCEDURE — 25010000002 ENOXAPARIN PER 10 MG: Performed by: STUDENT IN AN ORGANIZED HEALTH CARE EDUCATION/TRAINING PROGRAM

## 2023-04-08 PROCEDURE — 0202U NFCT DS 22 TRGT SARS-COV-2: CPT | Performed by: EMERGENCY MEDICINE

## 2023-04-08 PROCEDURE — 94640 AIRWAY INHALATION TREATMENT: CPT

## 2023-04-08 PROCEDURE — 25010000002 METHYLPREDNISOLONE PER 40 MG: Performed by: STUDENT IN AN ORGANIZED HEALTH CARE EDUCATION/TRAINING PROGRAM

## 2023-04-08 PROCEDURE — 96365 THER/PROPH/DIAG IV INF INIT: CPT

## 2023-04-08 PROCEDURE — 25010000002 MAGNESIUM SULFATE 2 GM/50ML SOLUTION: Performed by: EMERGENCY MEDICINE

## 2023-04-08 PROCEDURE — 83880 ASSAY OF NATRIURETIC PEPTIDE: CPT | Performed by: EMERGENCY MEDICINE

## 2023-04-08 PROCEDURE — 96376 TX/PRO/DX INJ SAME DRUG ADON: CPT

## 2023-04-08 PROCEDURE — 94799 UNLISTED PULMONARY SVC/PX: CPT

## 2023-04-08 PROCEDURE — 71045 X-RAY EXAM CHEST 1 VIEW: CPT

## 2023-04-08 PROCEDURE — 99285 EMERGENCY DEPT VISIT HI MDM: CPT

## 2023-04-08 PROCEDURE — C9803 HOPD COVID-19 SPEC COLLECT: HCPCS

## 2023-04-08 PROCEDURE — 94760 N-INVAS EAR/PLS OXIMETRY 1: CPT

## 2023-04-08 PROCEDURE — 84484 ASSAY OF TROPONIN QUANT: CPT | Performed by: STUDENT IN AN ORGANIZED HEALTH CARE EDUCATION/TRAINING PROGRAM

## 2023-04-08 PROCEDURE — 87636 SARSCOV2 & INF A&B AMP PRB: CPT | Performed by: EMERGENCY MEDICINE

## 2023-04-08 RX ORDER — MAGNESIUM SULFATE HEPTAHYDRATE 40 MG/ML
2 INJECTION, SOLUTION INTRAVENOUS ONCE
Status: COMPLETED | OUTPATIENT
Start: 2023-04-08 | End: 2023-04-08

## 2023-04-08 RX ORDER — CETIRIZINE HYDROCHLORIDE 10 MG/1
10 TABLET ORAL DAILY
Status: DISCONTINUED | OUTPATIENT
Start: 2023-04-08 | End: 2023-04-10 | Stop reason: HOSPADM

## 2023-04-08 RX ORDER — VALSARTAN 80 MG/1
160 TABLET ORAL DAILY
Status: DISCONTINUED | OUTPATIENT
Start: 2023-04-08 | End: 2023-04-10 | Stop reason: HOSPADM

## 2023-04-08 RX ORDER — POTASSIUM CITRATE 10 MEQ/1
10 TABLET, EXTENDED RELEASE ORAL DAILY
Status: DISCONTINUED | OUTPATIENT
Start: 2023-04-09 | End: 2023-04-10 | Stop reason: HOSPADM

## 2023-04-08 RX ORDER — IPRATROPIUM BROMIDE AND ALBUTEROL SULFATE 2.5; .5 MG/3ML; MG/3ML
3 SOLUTION RESPIRATORY (INHALATION)
Status: DISCONTINUED | OUTPATIENT
Start: 2023-04-08 | End: 2023-04-08

## 2023-04-08 RX ORDER — BUPROPION HYDROCHLORIDE 150 MG/1
150 TABLET, EXTENDED RELEASE ORAL 2 TIMES DAILY
Status: DISCONTINUED | OUTPATIENT
Start: 2023-04-08 | End: 2023-04-10 | Stop reason: HOSPADM

## 2023-04-08 RX ORDER — TIZANIDINE 4 MG/1
4 TABLET ORAL NIGHTLY
Status: DISCONTINUED | OUTPATIENT
Start: 2023-04-08 | End: 2023-04-10 | Stop reason: HOSPADM

## 2023-04-08 RX ORDER — ACETAMINOPHEN 650 MG/1
650 SUPPOSITORY RECTAL EVERY 4 HOURS PRN
Status: DISCONTINUED | OUTPATIENT
Start: 2023-04-08 | End: 2023-04-10 | Stop reason: HOSPADM

## 2023-04-08 RX ORDER — SODIUM CHLORIDE 0.9 % (FLUSH) 0.9 %
10 SYRINGE (ML) INJECTION EVERY 12 HOURS SCHEDULED
Status: DISCONTINUED | OUTPATIENT
Start: 2023-04-08 | End: 2023-04-10 | Stop reason: HOSPADM

## 2023-04-08 RX ORDER — METHYLPREDNISOLONE SODIUM SUCCINATE 40 MG/ML
40 INJECTION, POWDER, LYOPHILIZED, FOR SOLUTION INTRAMUSCULAR; INTRAVENOUS EVERY 8 HOURS
Status: DISCONTINUED | OUTPATIENT
Start: 2023-04-08 | End: 2023-04-09

## 2023-04-08 RX ORDER — IPRATROPIUM BROMIDE AND ALBUTEROL SULFATE 2.5; .5 MG/3ML; MG/3ML
6 SOLUTION RESPIRATORY (INHALATION) ONCE
Status: COMPLETED | OUTPATIENT
Start: 2023-04-08 | End: 2023-04-08

## 2023-04-08 RX ORDER — ALBUTEROL SULFATE 2.5 MG/3ML
2.5 SOLUTION RESPIRATORY (INHALATION) EVERY 6 HOURS PRN
Status: DISCONTINUED | OUTPATIENT
Start: 2023-04-08 | End: 2023-04-10 | Stop reason: HOSPADM

## 2023-04-08 RX ORDER — IPRATROPIUM BROMIDE AND ALBUTEROL SULFATE 2.5; .5 MG/3ML; MG/3ML
3 SOLUTION RESPIRATORY (INHALATION) EVERY 6 HOURS PRN
Status: DISCONTINUED | OUTPATIENT
Start: 2023-04-08 | End: 2023-04-10 | Stop reason: HOSPADM

## 2023-04-08 RX ORDER — LEVOTHYROXINE SODIUM 0.05 MG/1
50 TABLET ORAL DAILY
Status: DISCONTINUED | OUTPATIENT
Start: 2023-04-08 | End: 2023-04-10 | Stop reason: HOSPADM

## 2023-04-08 RX ORDER — ACETAMINOPHEN 325 MG/1
650 TABLET ORAL EVERY 4 HOURS PRN
Status: DISCONTINUED | OUTPATIENT
Start: 2023-04-08 | End: 2023-04-10 | Stop reason: HOSPADM

## 2023-04-08 RX ORDER — SODIUM CHLORIDE 0.9 % (FLUSH) 0.9 %
10 SYRINGE (ML) INJECTION AS NEEDED
Status: DISCONTINUED | OUTPATIENT
Start: 2023-04-08 | End: 2023-04-10 | Stop reason: HOSPADM

## 2023-04-08 RX ORDER — ENOXAPARIN SODIUM 100 MG/ML
40 INJECTION SUBCUTANEOUS DAILY
Status: DISCONTINUED | OUTPATIENT
Start: 2023-04-08 | End: 2023-04-10 | Stop reason: HOSPADM

## 2023-04-08 RX ORDER — ALLOPURINOL 100 MG/1
100 TABLET ORAL DAILY
Status: DISCONTINUED | OUTPATIENT
Start: 2023-04-08 | End: 2023-04-10 | Stop reason: HOSPADM

## 2023-04-08 RX ORDER — PANTOPRAZOLE SODIUM 40 MG/1
40 TABLET, DELAYED RELEASE ORAL
Status: DISCONTINUED | OUTPATIENT
Start: 2023-04-09 | End: 2023-04-10 | Stop reason: HOSPADM

## 2023-04-08 RX ORDER — MONTELUKAST SODIUM 10 MG/1
10 TABLET ORAL NIGHTLY
Status: DISCONTINUED | OUTPATIENT
Start: 2023-04-08 | End: 2023-04-10 | Stop reason: HOSPADM

## 2023-04-08 RX ORDER — ACETAMINOPHEN AND CODEINE PHOSPHATE 300; 30 MG/1; MG/1
1 TABLET ORAL EVERY 4 HOURS PRN
Status: DISCONTINUED | OUTPATIENT
Start: 2023-04-08 | End: 2023-04-10 | Stop reason: HOSPADM

## 2023-04-08 RX ORDER — METHYLPREDNISOLONE SODIUM SUCCINATE 125 MG/2ML
125 INJECTION, POWDER, LYOPHILIZED, FOR SOLUTION INTRAMUSCULAR; INTRAVENOUS ONCE
Status: COMPLETED | OUTPATIENT
Start: 2023-04-08 | End: 2023-04-08

## 2023-04-08 RX ORDER — DICYCLOMINE HYDROCHLORIDE 10 MG/1
10 CAPSULE ORAL 3 TIMES DAILY
Status: DISCONTINUED | OUTPATIENT
Start: 2023-04-08 | End: 2023-04-10 | Stop reason: HOSPADM

## 2023-04-08 RX ORDER — ACETAMINOPHEN 160 MG/5ML
650 SOLUTION ORAL EVERY 4 HOURS PRN
Status: DISCONTINUED | OUTPATIENT
Start: 2023-04-08 | End: 2023-04-10 | Stop reason: HOSPADM

## 2023-04-08 RX ORDER — FLUTICASONE PROPIONATE 50 MCG
1 SPRAY, SUSPENSION (ML) NASAL DAILY
Status: DISCONTINUED | OUTPATIENT
Start: 2023-04-08 | End: 2023-04-10 | Stop reason: HOSPADM

## 2023-04-08 RX ORDER — IPRATROPIUM BROMIDE 42 UG/1
2 SPRAY, METERED NASAL 3 TIMES DAILY PRN
Status: DISCONTINUED | OUTPATIENT
Start: 2023-04-08 | End: 2023-04-10 | Stop reason: HOSPADM

## 2023-04-08 RX ORDER — CLONAZEPAM 0.5 MG/1
0.5 TABLET ORAL NIGHTLY PRN
Status: DISCONTINUED | OUTPATIENT
Start: 2023-04-08 | End: 2023-04-10 | Stop reason: HOSPADM

## 2023-04-08 RX ORDER — ISOSORBIDE MONONITRATE 30 MG/1
30 TABLET, EXTENDED RELEASE ORAL EVERY MORNING
Status: DISCONTINUED | OUTPATIENT
Start: 2023-04-08 | End: 2023-04-10 | Stop reason: HOSPADM

## 2023-04-08 RX ORDER — CALCIUM CARBONATE 200(500)MG
2 TABLET,CHEWABLE ORAL 3 TIMES DAILY PRN
Status: DISCONTINUED | OUTPATIENT
Start: 2023-04-08 | End: 2023-04-10 | Stop reason: HOSPADM

## 2023-04-08 RX ORDER — FERROUS SULFATE TAB EC 324 MG (65 MG FE EQUIVALENT) 324 (65 FE) MG
324 TABLET DELAYED RESPONSE ORAL
Status: DISCONTINUED | OUTPATIENT
Start: 2023-04-08 | End: 2023-04-10 | Stop reason: HOSPADM

## 2023-04-08 RX ORDER — CODEINE PHOSPHATE AND GUAIFENESIN 10; 100 MG/5ML; MG/5ML
10 SOLUTION ORAL 4 TIMES DAILY PRN
Status: DISCONTINUED | OUTPATIENT
Start: 2023-04-08 | End: 2023-04-10 | Stop reason: HOSPADM

## 2023-04-08 RX ORDER — AZITHROMYCIN 250 MG/1
250 TABLET, FILM COATED ORAL
Status: COMPLETED | OUTPATIENT
Start: 2023-04-08 | End: 2023-04-09

## 2023-04-08 RX ORDER — SODIUM CHLORIDE 9 MG/ML
40 INJECTION, SOLUTION INTRAVENOUS AS NEEDED
Status: DISCONTINUED | OUTPATIENT
Start: 2023-04-08 | End: 2023-04-10 | Stop reason: HOSPADM

## 2023-04-08 RX ORDER — PROMETHAZINE HYDROCHLORIDE AND CODEINE PHOSPHATE 6.25; 1 MG/5ML; MG/5ML
5 SYRUP ORAL EVERY 4 HOURS PRN
Status: DISCONTINUED | OUTPATIENT
Start: 2023-04-08 | End: 2023-04-10 | Stop reason: HOSPADM

## 2023-04-08 RX ORDER — ONDANSETRON 2 MG/ML
4 INJECTION INTRAMUSCULAR; INTRAVENOUS EVERY 6 HOURS PRN
Status: DISCONTINUED | OUTPATIENT
Start: 2023-04-08 | End: 2023-04-10 | Stop reason: HOSPADM

## 2023-04-08 RX ADMIN — CETIRIZINE HYDROCHLORIDE 10 MG: 10 TABLET, FILM COATED ORAL at 12:29

## 2023-04-08 RX ADMIN — METHYLPREDNISOLONE SODIUM SUCCINATE 125 MG: 125 INJECTION, POWDER, FOR SOLUTION INTRAMUSCULAR; INTRAVENOUS at 08:17

## 2023-04-08 RX ADMIN — VALSARTAN 160 MG: 80 TABLET, FILM COATED ORAL at 12:28

## 2023-04-08 RX ADMIN — CLONAZEPAM 0.5 MG: 0.5 TABLET ORAL at 21:46

## 2023-04-08 RX ADMIN — ENOXAPARIN SODIUM 40 MG: 100 INJECTION SUBCUTANEOUS at 11:36

## 2023-04-08 RX ADMIN — ACETAMINOPHEN AND CODEINE PHOSPHATE 1 TABLET: 300; 30 TABLET ORAL at 14:34

## 2023-04-08 RX ADMIN — ALBUTEROL SULFATE 2.5 MG: 2.5 SOLUTION RESPIRATORY (INHALATION) at 21:49

## 2023-04-08 RX ADMIN — FLUTICASONE PROPIONATE 1 SPRAY: 50 SPRAY, METERED NASAL at 13:25

## 2023-04-08 RX ADMIN — Medication 2 TABLET: at 15:58

## 2023-04-08 RX ADMIN — FERROUS SULFATE TAB EC 324 MG (65 MG FE EQUIVALENT) 324 MG: 324 (65 FE) TABLET DELAYED RESPONSE at 12:29

## 2023-04-08 RX ADMIN — LEVOTHYROXINE SODIUM 50 MCG: 0.05 TABLET ORAL at 12:29

## 2023-04-08 RX ADMIN — BUPROPION HYDROCHLORIDE 150 MG: 150 TABLET, EXTENDED RELEASE ORAL at 12:28

## 2023-04-08 RX ADMIN — Medication 10 ML: at 21:40

## 2023-04-08 RX ADMIN — TIZANIDINE 4 MG: 4 TABLET ORAL at 21:40

## 2023-04-08 RX ADMIN — IPRATROPIUM BROMIDE AND ALBUTEROL SULFATE 6 ML: 2.5; .5 SOLUTION RESPIRATORY (INHALATION) at 08:13

## 2023-04-08 RX ADMIN — ISOSORBIDE MONONITRATE 30 MG: 30 TABLET, EXTENDED RELEASE ORAL at 12:30

## 2023-04-08 RX ADMIN — GUAIFENESIN AND CODEINE PHOSPHATE 10 ML: 100; 10 SOLUTION ORAL at 21:46

## 2023-04-08 RX ADMIN — DICYCLOMINE HYDROCHLORIDE 10 MG: 10 CAPSULE ORAL at 15:15

## 2023-04-08 RX ADMIN — MONTELUKAST 10 MG: 10 TABLET, FILM COATED ORAL at 21:41

## 2023-04-08 RX ADMIN — AZITHROMYCIN MONOHYDRATE 250 MG: 250 TABLET ORAL at 12:29

## 2023-04-08 RX ADMIN — Medication 10 ML: at 11:36

## 2023-04-08 RX ADMIN — IPRATROPIUM BROMIDE AND ALBUTEROL SULFATE 3 ML: .5; 3 SOLUTION RESPIRATORY (INHALATION) at 12:17

## 2023-04-08 RX ADMIN — BUPROPION HYDROCHLORIDE 150 MG: 150 TABLET, EXTENDED RELEASE ORAL at 21:40

## 2023-04-08 RX ADMIN — ALLOPURINOL 100 MG: 100 TABLET ORAL at 12:29

## 2023-04-08 RX ADMIN — SODIUM CHLORIDE 500 ML: 9 INJECTION, SOLUTION INTRAVENOUS at 08:46

## 2023-04-08 RX ADMIN — METHYLPREDNISOLONE SODIUM SUCCINATE 40 MG: 40 INJECTION, POWDER, FOR SOLUTION INTRAMUSCULAR; INTRAVENOUS at 15:14

## 2023-04-08 RX ADMIN — MAGNESIUM SULFATE HEPTAHYDRATE 2 G: 40 INJECTION, SOLUTION INTRAVENOUS at 08:21

## 2023-04-08 NOTE — PLAN OF CARE
Goal Outcome Evaluation:  Plan of Care Reviewed With: patient        Progress: improving  Outcome Evaluation: New admit.  VSS.  Pt sit up in chair for a few hours during shift.  No changes in pt condition to report.  WIll monitor.

## 2023-04-08 NOTE — H&P
[facility]   HISTORY AND PHYSICAL      Name:  Raquel Mariee   Age:  67 y.o.  Sex:  female  :  1955  MRN:  [id number]   Visit Number:  [id number]  Admission Date:  2023  Date Of Service:  23  Primary Care Physician:  Sanjeev Rawls MD    Chief Complaint:     Shortness of air, cough    History Of Presenting Illness:      Patient is a 67-year-old woman with past medical history of eosinophilic asthma, aortic valve stenosis, CKD, diabetes diet-controlled, fibromyalgia, hypertension, hypothyroidism, IBS, migraines, obesity BMI 35, osteoarthritis, PTSD, history of Pseudomonas infection, sleep apnea, TIA, trochanteric bursitis.  Also reported history of tracheal reconstruction surgery.  Presented to [facility] ED on 2023 with concern for cough and wheezing for 3 days.  Recently was evaluated and provided steroids, nebulizer, antibiotics.  Coughing got worse night prior to presentation, was unable to sleep all night due to cough.  Denied chest pain.    ED summary: Afebrile, tachycardic, vital signs otherwise stable on 2 L nasal cannula.  No previous home O2 requirement.  EKG sinus rhythm, no ST elevations or depressions.  High-sensitivity troponin slightly elevated 19, no chest pain, ACS not suspected, likely demand ischemia with hypoxia.  proBNP now elevated.  Creatinine 1.08.  CBC unremarkable.  COVID/flu negative.  Respiratory PCR panel positive for parainfluenza virus 3.  CXR stable chest.    Observation admission 2023 with hypoxia secondary to parainfluenza virus infection with asthma exacerbation.    Review Of Systems:    All systems were reviewed and negative except as mentioned in history of presenting illness, assessment and plan.    Past Medical History: Patient  has a past medical history of Anxiety, Aortic valve stenosis, Cardiac murmur, Chronic kidney disease, Clostridium difficile infection, COPD (chronic obstructive pulmonary disease), Depression,  Diabetes mellitus, Eosinophilic asthma, Fibromyalgia, primary, Hypertension, Hypogammaglobulinemia, Hypothyroidism, Irritable bowel syndrome, Migraines, Morbid obesity, Osteoarthritis, Prinzmetal angina (1990), Pseudomonas infection, PTSD (post-traumatic stress disorder), Renal insufficiency, Sinus tachycardia (1990), Sleep apnea, Tachycardia, TIA (transient ischemic attack) (2017), and Trochanteric bursitis (01/25/2023).    Past Surgical History: Patient  has a past surgical history that includes Appendectomy; Tonsillectomy; Replacement total knee bilateral; Total abdominal hysterectomy w/ bilateral salpingoophorectomy; Gastric bypass; Bunionectomy (Left); Hand surgery (Left); Teeth Extraction; Cardiac catheterization (2017); and Carpal tunnel release (Right).    Social History: Patient  reports that she has never smoked. She has never used smokeless tobacco. She reports that she does not currently use alcohol. She reports that she does not use drugs.    Family History:  Patient's family history has been reviewed and found to be noncontributory.     Allergies:      Cefaclor, Cephalosporins, Ambien [zolpidem tartrate], Amoxicillin, Cardizem [diltiazem hcl], Hydrocodone, Lexapro [escitalopram oxalate], Metoclopramide, Mobic [meloxicam], Sumatriptan, Topamax [topiramate], Verapamil, Zolpidem, Penicillins, Cephalexin, Edecrin [ethacrynic acid], Hydrochlorothiazide, Hydrocodone-acetaminophen, and Sulfa antibiotics    Home Medications:    Prior to Admission Medications     Prescriptions Last Dose Informant Patient Reported? Taking?    acetaminophen-codeine (TYLENOL #3) 300-30 MG per tablet Past Week  No Yes    Take 1 tablet by mouth Every 4 (Four) Hours As Needed for Moderate Pain.    albuterol sulfate  (90 Base) MCG/ACT inhaler 4/7/2023  No Yes    Inhale 2 puffs by mouth every 4 to 6 hours as needed for cough, wheeze, shortness of breath. Use with vortex spacer    allopurinol (Zyloprim) 100 MG tablet 4/7/2023  No  Yes    Take 1 tablet by mouth Daily.    azithromycin (Zithromax Z-Russ) 250 MG tablet 4/7/2023  No Yes    Take 2 tablets by mouth the first day, then 1 tablet daily for 4 days.    betamethasone valerate (VALISONE) 0.1 % cream More than a month  No No    Apply topically to the appropriate area as directed Daily.    Budeson-Glycopyrrol-Formoterol (Breztri Aerosphere) 160-9-4.8 MCG/ACT aerosol inhaler 4/7/2023  No Yes    Inhale 2 puffs by mouth twice daily. Rinse mouth after use    buPROPion SR (WELLBUTRIN SR) 150 MG 12 hr tablet   No No    Take 1 tablet by mouth 2 (Two) Times a Day.    calcium carbonate (Antacid Maximum) 1000 MG chewable tablet 4/7/2023 Self Yes Yes    Chew 500 mg 3 (Three) Times a Day.    cholecalciferol (VITAMIN D3) 25 MCG (1000 UT) tablet 4/7/2023  Yes Yes    Take 1 tablet by mouth Daily.    clonazePAM (KlonoPIN) 0.5 MG tablet 4/7/2023  No Yes    Take 1 tablet by mouth At Night As Needed    dicyclomine (BENTYL) 10 MG capsule 4/7/2023  No Yes    Take 1 capsule by mouth 3 (Three) Times a Day.    diphenhydrAMINE (Benadryl Allergy) 25 MG tablet Past Week  No Yes    Take 1-2 tablets by mouth Every 4-6 Hours As Needed.    estradiol (ESTRACE) 0.1 MG/GM vaginal cream Past Week  No Yes    Apply externally as directed to urethra twice weekly at bedtime.    ferrous sulfate 325 (65 FE) MG EC tablet 4/7/2023  Yes Yes    Take 1 tablet by mouth.    fexofenadine (ALLEGRA) 180 MG tablet 4/7/2023  Yes Yes    Take 1 tablet by mouth Daily.    fluticasone (FLONASE) 50 MCG/ACT nasal spray 4/7/2023  No Yes    Instill 2 sprays into each nostril daily.    Patient taking differently:  1 spray by Each Nare route Daily.    furosemide (LASIX) 40 MG tablet Past Month  No Yes    Take 1 tablet by mouth Daily. Two days a month.    glucosamine-chondroitin 500-400 MG capsule capsule 4/7/2023  Yes Yes    Take 1 capsule by mouth 2 (Two) Times a Day With Meals.    guaiFENesin-codeine (GUAIFENESIN AC) 100-10 MG/5ML liquid 4/8/2023  No  Yes    Take 10 mL by mouth 4 (Four) Times a Day As Needed for Cough    heparin injection 500 Units   No No    Hypertonic Nasal Wash (Sinus Rinse Kit) pack More than a month  No No    use once or twice daily as needed    immune globulin, human, (Gammagard) 20 GM/200ML solution infusion Past Month  No Yes    Infuse 40 g into a venous catheter Every 28 (Twenty-Eight) Days.    ipratropium (ATROVENT) 0.06 % nasal spray 4/7/2023  No Yes    Instill 1-2 sprays each nostril 2-3 times daily as needed    ipratropium-albuterol (DUO-NEB) 0.5-2.5 mg/3 ml nebulizer 4/7/2023  No Yes    Inhale the contents of 1 vial into nebulizer every 4-6 hours as needed for cough,wheezing and shortness of breath.    isosorbide mononitrate (IMDUR) 30 MG 24 hr tablet 4/7/2023  No Yes    Take 1 tablet by mouth Every Morning.    levothyroxine (SYNTHROID, LEVOTHROID) 50 MCG tablet 4/7/2023  No Yes    Take 1 tablet by mouth Daily.    Magnesium 250 MG tablet 4/7/2023 Self Yes Yes    Take 2 tablets by mouth 2 (Two) Times a Day.    montelukast (SINGULAIR) 10 MG tablet 4/7/2023  No Yes    Take 1 tablet by mouth every night at bedtime.    multivitamin with minerals tablet tablet 4/7/2023  Yes Yes    Take 1 tablet by mouth Daily.    omeprazole (priLOSEC) 40 MG capsule 4/7/2023  No Yes    Take 1 capsule by mouth 2 (Two) Times a Day.    ondansetron (ZOFRAN) 4 MG tablet 4/7/2023  No Yes    Take 1 tablet by mouth Every 8 (Eight) Hours As Needed for Nausea or Vomiting.    POTASSIUM CHLORIDE PO Past Month  Yes Yes    Take 20 mEq by mouth See Admin Instructions. Takes two times a month with Lasix    potassium citrate (UROCIT-K) 10 MEQ (1080 MG) CR tablet 4/7/2023  No Yes    Take 1 tablet by mouth Daily.    predniSONE (DELTASONE) 10 MG tablet 4/7/2023  No Yes    Take 6 tablets by mouth once on day one then decrease by one tablet each day thereafter (6,5,4,3,2,1)    Semaglutide (Rybelsus) 7 MG tablet 4/7/2023  No Yes    Take 1 tablet by mouth Daily.    sodium  chloride 0.9 % flush 10 mL   No No    sodium chloride 0.9 % flush 10 mL   No No    sodium chloride 0.9 % flush 20 mL   No No    Spacer/Aero-Holding Chambers (AeroChamber Plus Wali-Vu) misc   No Yes    Use as directed with albuterol inhaler    Spacer/Aero-Holding Chambers (AeroChamber Plus Wali-Vu) misc   No Yes    Use as directed with inhaler    tiZANidine (ZANAFLEX) 4 MG tablet 4/7/2023  No Yes    Take 1 tablet by mouth Every Night.    ubrogepant (Ubrelvy) 100 MG tablet 4/7/2023  No Yes    Take 1 tablet by mouth 1 (One) Time As Needed (migraine) for up to 1 dose.    valsartan (DIOVAN) 160 MG tablet 4/7/2023  No Yes    Take 1 tablet by mouth Daily.    vitamin B-12 (CYANOCOBALAMIN) 1000 MCG tablet 4/7/2023  Yes Yes    Take 1 tablet by mouth Daily.        ED Medications:    Medications   sodium chloride 0.9 % flush 10 mL (has no administration in time range)   promethazine-codeine (PHENERGAN with CODEINE) 6.25-10 MG/5ML syrup 5 mL (has no administration in time range)   sodium chloride 0.9 % flush 10 mL (10 mL Intravenous Given 4/8/23 1136)   sodium chloride 0.9 % flush 10 mL (has no administration in time range)   sodium chloride 0.9 % infusion 40 mL (has no administration in time range)   acetaminophen (TYLENOL) tablet 650 mg (has no administration in time range)     Or   acetaminophen (TYLENOL) 160 MG/5ML solution 650 mg (has no administration in time range)     Or   acetaminophen (TYLENOL) suppository 650 mg (has no administration in time range)   ondansetron (ZOFRAN) injection 4 mg (has no administration in time range)   Enoxaparin Sodium (LOVENOX) syringe 40 mg (40 mg Subcutaneous Given 4/8/23 1136)   Potassium Replacement - Follow Nurse / BPA Driven Protocol (has no administration in time range)   Magnesium Standard Dose Replacement - Follow Nurse / BPA Driven Protocol (has no administration in time range)   Phosphorus Replacement - Follow Nurse / BPA Driven Protocol (has no administration in time range)    Calcium Replacement - Follow Nurse / BPA Driven Protocol (has no administration in time range)   ipratropium-albuterol (DUO-NEB) nebulizer solution 3 mL (has no administration in time range)   methylPREDNISolone sodium succinate (SOLU-Medrol) injection 40 mg (has no administration in time range)   acetaminophen-codeine (TYLENOL #3) 300-30 MG per tablet 1 tablet (has no administration in time range)   albuterol (PROVENTIL) nebulizer solution 0.083% 2.5 mg/3mL (has no administration in time range)   allopurinol (ZYLOPRIM) tablet 100 mg (has no administration in time range)   buPROPion SR (WELLBUTRIN SR) 12 hr tablet 150 mg (has no administration in time range)   clonazePAM (KlonoPIN) tablet 0.5 mg (has no administration in time range)   dicyclomine (BENTYL) capsule 10 mg (has no administration in time range)   ferrous sulfate EC tablet 324 mg (has no administration in time range)   cetirizine (zyrTEC) tablet 10 mg (has no administration in time range)   fluticasone (FLONASE) 50 MCG/ACT nasal spray 1 spray (has no administration in time range)   guaiFENesin-codeine (ROMILAR-AC) syrup 10 mL (has no administration in time range)   isosorbide mononitrate (IMDUR) 24 hr tablet 30 mg (has no administration in time range)   levothyroxine (SYNTHROID, LEVOTHROID) tablet 50 mcg (has no administration in time range)   montelukast (SINGULAIR) tablet 10 mg (has no administration in time range)   pantoprazole (PROTONIX) EC tablet 40 mg (has no administration in time range)   tiZANidine (ZANAFLEX) tablet 4 mg (has no administration in time range)   valsartan (DIOVAN) tablet 160 mg (has no administration in time range)   ipratropium-albuterol (DUO-NEB) nebulizer solution 6 mL (6 mL Nebulization Given 4/8/23 0813)   methylPREDNISolone sodium succinate (SOLU-Medrol) injection 125 mg (125 mg Intravenous Given 4/8/23 0817)   magnesium sulfate 2g/50 mL (PREMIX) infusion (0 g Intravenous Stopped 4/8/23 0846)   sodium chloride 0.9 % bolus  "500 mL (500 mL Intravenous New Bag 4/8/23 0846)     Vital Signs:  Temp:  [98.3 °F (36.8 °C)-98.6 °F (37 °C)] 98.6 °F (37 °C)  Heart Rate:  [] 103  Resp:  [20] 20  BP: (111-169)/(55-96) 169/76        04/08/23  0627 04/08/23  1027   Weight: 92.1 kg (203 lb) 94.3 kg (207 lb 14.3 oz)     Body mass index is 35.68 kg/m².    Physical Exam:     Most recent vital Signs: /76 (BP Location: Right arm, Patient Position: Sitting)   Pulse 103   Temp 98.6 °F (37 °C) (Oral)   Resp 20   Ht 162.6 cm (64\")   Wt 94.3 kg (207 lb 14.3 oz)   SpO2 95%   BMI 35.68 kg/m²     Physical Exam  Constitutional:       General: She is not in acute distress.     Appearance: She is not ill-appearing or toxic-appearing.   HENT:      Mouth/Throat:      Mouth: Mucous membranes are moist.   Eyes:      Extraocular Movements: Extraocular movements intact.   Cardiovascular:      Rate and Rhythm: Normal rate and regular rhythm.      Pulses: Normal pulses.      Heart sounds: Normal heart sounds.   Pulmonary:      Effort: Pulmonary effort is normal.      Comments: Expiratory coarse sounds without wheezing, moderately diminished all fields  Abdominal:      Palpations: Abdomen is soft.      Tenderness: There is no abdominal tenderness.   Musculoskeletal:      Right lower leg: No edema.      Left lower leg: No edema.   Skin:     General: Skin is warm.   Neurological:      General: No focal deficit present.      Mental Status: She is alert and oriented to person, place, and time.   Psychiatric:         Mood and Affect: Mood normal.         Thought Content: Thought content normal.         Laboratory data:    I have reviewed the labs done in the emergency room.    Results from last 7 days   Lab Units 04/08/23  0643   SODIUM mmol/L 136   POTASSIUM mmol/L 3.8   CHLORIDE mmol/L 101   CO2 mmol/L 26.1   BUN mg/dL 13   CREATININE mg/dL 1.08*   CALCIUM mg/dL 8.7   BILIRUBIN mg/dL 0.3   ALK PHOS U/L 61   ALT (SGPT) U/L 21   AST (SGOT) U/L 28   GLUCOSE mg/dL " 95     Results from last 7 days   Lab Units 04/08/23  0643   WBC 10*3/mm3 5.97   HEMOGLOBIN g/dL 12.1   HEMATOCRIT % 34.5   PLATELETS 10*3/mm3 191         Results from last 7 days   Lab Units 04/08/23  0643   HSTROP T ng/L 19*     Results from last 7 days   Lab Units 04/08/23  0643   PROBNP pg/mL 688.5                       Invalid input(s): USDES,  BLOODU, NITRITITE, BACT, EP    Pain Management Panel    There is no flowsheet data to display.         EKG:      EKG sinus rhythm, no ST elevations or depressions.     Radiology:    XR Chest 1 View    Result Date: 4/8/2023  PROCEDURE: XR CHEST 1 VW-  HISTORY: [facility] Triage Protocol  COMPARISON: July 22, 2022.  FINDINGS: The heart is normal in size. Left subclavian port is stable in position. Left lung is clear. Postsurgical change again noted in the right hemithorax, stable from prior exam. No acute infiltrate is seen.. The mediastinum is unremarkable. There is no pneumothorax.  There are no acute osseous abnormalities. Apical lordotic positioning noted.      Stable chest.    This report was signed and finalized on 4/8/2023 7:45 AM by Lisa Castellon MD.      Assessment/Plan:    Appeared well upon admission, with significant barking nonproductive cough, no respiratory distress.  Anticipate patient may be able to go in a day or 2, may need a little bit of oxygen.    Hypoxia  Parainfluenza virus  Asthma exacerbation  Supplemental oxygen as needed keep saturation above 90%.  DuoNeb, IV steroids.  Continue azithromycin 250 mg daily, patient was already on this for 3 days prior to presentation.    Patient reports that she has needed oxygen with asthma exacerbations in the past.  She used to get these a few times a year, has not had an episode in a couple years since she moved to [place] from [place].    Elevated troponin   Trend troponin, no chest pain, ACS not suspected, likely demand ischemia with hypoxia.    Chronic:  eosinophilic asthma, aortic valve stenosis, CKD, diabetes  diet-controlled, fibromyalgia, hypertension, hypothyroidism, IBS, migraines, obesity BMI 35, osteoarthritis, PTSD, history of Pseudomonas infection, sleep apnea, TIA, trochanteric bursitis.     Continue home medications.  She has some home inhalers and other medications that  will bring in, nurse will enter into system for physician review and approval as appropriate.    Risk Assessment: High  DVT Prophylaxis: Lovenox  Code Status: Full code  Diet: Cardiac/carbohydrate controlled    Advance Care Planning   ACP discussion was held with the patient during this visit. Patient does not have an advance directive, declines further assistance.           Brian Joseph Kerley, DO  04/08/23  12:01 EDT    Dictated utilizing Dragon dictation.

## 2023-04-08 NOTE — ED PROVIDER NOTES
Subjective   History of Present Illness  67-year-old female with shortness of breath cough and wheeze.  Symptoms have been present for 3 days.  Seen and evaluated here few days ago for asthma exacerbation started on steroids and nebulizer.  States that she has had persistent constant coughing since 11:00 last night.  Unable to sleep secondary to cough.        Review of Systems   Respiratory: Positive for cough, shortness of breath and wheezing.    All other systems reviewed and are negative.      Past Medical History:   Diagnosis Date   • Anxiety    • Aortic valve stenosis    • Cardiac murmur    • Chronic kidney disease    • Clostridium difficile infection     in her 30s   • COPD (chronic obstructive pulmonary disease)    • Depression    • Diabetes mellitus     type II   • Eosinophilic asthma    • Fibromyalgia, primary    • Hypertension    • Hypogammaglobulinemia    • Hypothyroidism    • Irritable bowel syndrome    • Migraines    • Morbid obesity    • Osteoarthritis    • Prinzmetal angina 1990   • Pseudomonas infection    • PTSD (post-traumatic stress disorder)    • Renal insufficiency    • Sinus tachycardia 1990   • Sleep apnea     no longer uses/needs cpap   • Tachycardia    • TIA (transient ischemic attack) 2017   • Trochanteric bursitis 01/25/2023       Allergies   Allergen Reactions   • Cefaclor Hives and Swelling     Other reaction(s): SWELLING  Shed 4 layers of skin and extremely SOB  Shed 4 layers of skin and extremely SOB     • Cephalosporins Hives, Angioedema and Swelling     Rechallenge with cefipime caused rash on 1/14/08.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   • Verapamil Nausea And Vomiting     Dizzy   • Zolpidem Other (See Comments) and Unknown (See Comments)     Automatic behaviour in sleep.  Automatic behaviour in sleep.  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   • Cephalexin Rash   • Edecrin [Ethacrynic Acid] Rash and Other (See Comments)     Weight gain   • Hydrochlorothiazide Hives   • Hydrocodone-Acetaminophen GI Intolerance   • Sulfa Antibiotics Hives       Past Surgical History:   Procedure Laterality Date   • APPENDECTOMY     • BUNIONECTOMY Left    • CARDIAC CATHETERIZATION  2017    no stents needed   • CARPAL TUNNEL RELEASE Right    • GASTRIC BYPASS     • HAND SURGERY Left    • REPLACEMENT TOTAL KNEE BILATERAL     • TEETH EXTRACTION      all of bottom teeth removed   • TONSILLECTOMY     • TOTAL ABDOMINAL HYSTERECTOMY WITH SALPINGO OOPHORECTOMY         Family History   Problem Relation Age of Onset   • Diabetes Mother    • Stroke Mother    • Heart disease Mother    • Hypertension Mother    • Endometriosis Mother    • Diabetes Father    • Hypertension Father    • Stroke Father    • Heart attack Father    • Asthma Father    • COPD Father    • Dementia Father    • COPD Sister    • Asthma Sister    • Cancer Sister    • Brain cancer Sister    • Diabetes Sister    • Stroke Sister    • Heart attack Sister    • Endometriosis Sister    • Depression Sister    • Kidney disease Sister    • Diabetes Sister    • COPD Sister    • Asthma Sister    • Endometriosis Sister    • Depression Sister    • Heart attack Sister    • Endometriosis Sister    • COPD Brother    • Asthma Brother    • Diabetes Brother    • Asthma Brother    • COPD Brother    • Diabetes Brother    • Hypertension  Brother    • Asthma Daughter    • Endometriosis Daughter    • Osteoarthritis Daughter    • Hypertension Daughter    • Kidney failure Daughter    • Irritable bowel syndrome Daughter    • Anxiety disorder Daughter    • Bipolar disorder Daughter    • Depression Daughter    • Self-Injurious Behavior  Daughter    • Suicide Attempts Daughter    • Asthma Daughter    • Endometriosis Daughter    • Osteoarthritis Daughter    • Asthma Son    • ADD / ADHD Son    • Alcohol abuse Son    • Drug abuse Son    • Breast cancer Maternal Cousin    • OCD Neg Hx    • Paranoid behavior Neg Hx    • Schizophrenia Neg Hx    • Seizures Neg Hx    • Ovarian cancer Neg Hx        Social History     Socioeconomic History   • Marital status:    Tobacco Use   • Smoking status: Never   • Smokeless tobacco: Never   Vaping Use   • Vaping Use: Never used   Substance and Sexual Activity   • Alcohol use: Not Currently     Comment: ONCE A YEAR   • Drug use: Never   • Sexual activity: Defer           Objective   Physical Exam  Vitals and nursing note reviewed.   Constitutional:       Appearance: She is well-developed.   HENT:      Head: Normocephalic.   Eyes:      Pupils: Pupils are equal, round, and reactive to light.   Cardiovascular:      Rate and Rhythm: Normal rate and regular rhythm.   Pulmonary:      Effort: Tachypnea present.      Breath sounds: Wheezing present.      Comments: Mild tachypnea.  No accessory muscle use.  Coarse inspiratory and expiratory wheezing  Neurological:      Mental Status: She is alert.         Procedures           ED Course  ED Course as of 04/09/23 0731   Sat Apr 08, 2023   0639 EKG interpreted by me reveals sinus rhythm with rate of 99 bpm.  There are no acute ST segment or T wave changes.  This is a normal-appearing EKG. [TB]      ED Course User Index  [TB] Shaila Branch MD                                           [facility]  Chief complaint: Shortness of breath and cough    Initial impression of presenting  illness: 67-year-old female presents with shortness of breath cough and wheeze symptoms have been ongoing for 3 days.  States that she has had significant coughing since 11:00 last night and been unable to sleep    Comorbidities requiring consideration and/or management: Eosinophilic asthma, COPD, diabetes, fibromyalgia, other    Differential diagnosis includes but not limited to: Asthma exacerbation, COPD exacerbation, pneumonia, pneumothorax, viral URI, other    Patient arrives hemodynamically stable, afebrile, without respiratory distress with initial vitals interpreted by myself.  Pertinent initial vitals include  04/08 0627 141/96 98.3 °F (36.8 °C) 99 20         Pertinent features from physical exam: Coarse breath sounds with rhonchi and wheezing bilateral lung fields.  Mild tachypnea, mild conversational dyspnea    Initial diagnostic plan: CBC, CMP, respiratory panel, chest x-ray, troponin, BNP    Results from initial plan were reviewed and interpreted by myself and include: Respiratory panel shows parainfluenza virus positive, nonspecific indeterminate range high-sensitivity troponin, COVID and flu are negative, proBNP appropriate, CMP and CBC are reassuring and nonactionable, chest x-ray is negative for acute process      Diagnostic information from other sources includes: Review of previous visits, prior labs, prior imaging, available notes from prior evaluations or visits with specialists, medication list, allergies, past medical history, past surgical history    Interventions in the ED included: Duo nebs, magnesium, Solu-Medrol, promethazine with codeine    Reevaluation: Still increased respiratory effort, mild tachycardia and borderline hypoxia at rest with a pulse ox ranging between 89 to 91% on room air.  She was started on 2 L nasal cannula    Results/clinical rationale were discussed with patient and  at bedside    Consultations and discussions of results with other physicians: Dr. Kerley,  hospitalist who accepts patient for evaluation and medical management    Discussion of results/management/plan: Work-up most consistent with viral exacerbation of COPD secondary to parainfluenza virus.  Despite symptomatic treatment here in the ED the patient was having hypoxia at rest and was requiring 2 L nasal cannula to maintain appropriate oxygen saturations.  She will be admitted for further evaluation medical management.    Disposition plan: Admission    Critical Care  Performed by: Keith Zayas DO  Authorized by: Keith Zayas DO     Critical care provider statement:     Critical care time (minutes): 40    Critical care time was exclusive of:  Separately billable procedures and treating other patients    Critical care was necessary to treat or prevent imminent or life-threatening deterioration of the following conditions: Acute respiratory insufficiency, acute COPD exacerbation secondary to parainfluenza viral infection, other    Critical care was time spent personally by me on the following activities:  Ordering and performing treatments and interventions, development of treatment plan with patient or surrogate, discussions with consultants, evaluation of patient's response to treatment, examination of patient, ordering and review of laboratory studies, ordering and review of radiographic studies, pulse oximetry, re-evaluation of patient's condition and review of old charts      Final diagnoses:   Acute exacerbation of chronic obstructive pulmonary disease (COPD)   Acute respiratory insufficiency       ED Disposition  ED Disposition     ED Disposition   Decision to Admit    Condition   --    Comment   Level of Care: Telemetry [5]   Diagnosis: Acute exacerbation of chronic obstructive pulmonary disease (COPD) [851146]   Admitting Physician: KERLEY, BRIAN JOSEPH [[id number]]   Attending Physician: KERLEY, BRIAN JOSEPH [[id number]]               No follow-up provider specified.       Medication List      No  changes were made to your prescriptions during this visit.          Keith Zayas, DO  04/09/23 0731

## 2023-04-08 NOTE — Clinical Note
Level of Care: Telemetry [5]   Diagnosis: Acute exacerbation of chronic obstructive pulmonary disease (COPD) [167572]   Admitting Physician: KERLEY, BRIAN JOSEPH [[id number]]   Attending Physician: KERLEY, BRIAN JOSEPH [[id number]]   Certification: I Certify That Inpatient Hospital Services Are Medically Necessary For Greater Than 2 Midnights

## 2023-04-09 LAB
ANION GAP SERPL CALCULATED.3IONS-SCNC: 9.4 MMOL/L (ref 5–15)
BASOPHILS # BLD AUTO: 0.01 10*3/MM3 (ref 0–0.2)
BASOPHILS NFR BLD AUTO: 0.2 % (ref 0–1.5)
BUN SERPL-MCNC: 12 MG/DL (ref 8–23)
BUN/CREAT SERPL: 12.8 (ref 7–25)
CALCIUM SPEC-SCNC: 8.7 MG/DL (ref 8.6–10.5)
CHLORIDE SERPL-SCNC: 100 MMOL/L (ref 98–107)
CO2 SERPL-SCNC: 24.6 MMOL/L (ref 22–29)
CREAT SERPL-MCNC: 0.94 MG/DL (ref 0.57–1)
DEPRECATED RDW RBC AUTO: 44.7 FL (ref 37–54)
EGFRCR SERPLBLD CKD-EPI 2021: 66.6 ML/MIN/1.73
EOSINOPHIL # BLD AUTO: 0.01 10*3/MM3 (ref 0–0.4)
EOSINOPHIL NFR BLD AUTO: 0.2 % (ref 0.3–6.2)
ERYTHROCYTE [DISTWIDTH] IN BLOOD BY AUTOMATED COUNT: 12.8 % (ref 12.3–15.4)
GLUCOSE BLDC GLUCOMTR-MCNC: 117 MG/DL (ref 70–130)
GLUCOSE BLDC GLUCOMTR-MCNC: 152 MG/DL (ref 70–130)
GLUCOSE BLDC GLUCOMTR-MCNC: 163 MG/DL (ref 70–130)
GLUCOSE SERPL-MCNC: 162 MG/DL (ref 65–99)
HCT VFR BLD AUTO: 34.5 % (ref 34–46.6)
HGB BLD-MCNC: 11.7 G/DL (ref 12–15.9)
IMM GRANULOCYTES # BLD AUTO: 0.02 10*3/MM3 (ref 0–0.05)
IMM GRANULOCYTES NFR BLD AUTO: 0.4 % (ref 0–0.5)
LYMPHOCYTES # BLD AUTO: 0.33 10*3/MM3 (ref 0.7–3.1)
LYMPHOCYTES NFR BLD AUTO: 6.7 % (ref 19.6–45.3)
MCH RBC QN AUTO: 32.2 PG (ref 26.6–33)
MCHC RBC AUTO-ENTMCNC: 33.9 G/DL (ref 31.5–35.7)
MCV RBC AUTO: 95 FL (ref 79–97)
MONOCYTES # BLD AUTO: 0.21 10*3/MM3 (ref 0.1–0.9)
MONOCYTES NFR BLD AUTO: 4.3 % (ref 5–12)
NEUTROPHILS NFR BLD AUTO: 4.36 10*3/MM3 (ref 1.7–7)
NEUTROPHILS NFR BLD AUTO: 88.2 % (ref 42.7–76)
NRBC BLD AUTO-RTO: 0 /100 WBC (ref 0–0.2)
PLATELET # BLD AUTO: 185 10*3/MM3 (ref 140–450)
PMV BLD AUTO: 9.1 FL (ref 6–12)
POTASSIUM SERPL-SCNC: 4.6 MMOL/L (ref 3.5–5.2)
RBC # BLD AUTO: 3.63 10*6/MM3 (ref 3.77–5.28)
SODIUM SERPL-SCNC: 134 MMOL/L (ref 136–145)
WBC NRBC COR # BLD: 4.94 10*3/MM3 (ref 3.4–10.8)

## 2023-04-09 PROCEDURE — 82962 GLUCOSE BLOOD TEST: CPT

## 2023-04-09 PROCEDURE — 94799 UNLISTED PULMONARY SVC/PX: CPT

## 2023-04-09 PROCEDURE — 63710000001 INSULIN ASPART PER 5 UNITS: Performed by: STUDENT IN AN ORGANIZED HEALTH CARE EDUCATION/TRAINING PROGRAM

## 2023-04-09 PROCEDURE — 96372 THER/PROPH/DIAG INJ SC/IM: CPT

## 2023-04-09 PROCEDURE — 63710000001 DIPHENHYDRAMINE PER 50 MG: Performed by: FAMILY MEDICINE

## 2023-04-09 PROCEDURE — 85025 COMPLETE CBC W/AUTO DIFF WBC: CPT | Performed by: STUDENT IN AN ORGANIZED HEALTH CARE EDUCATION/TRAINING PROGRAM

## 2023-04-09 PROCEDURE — G0378 HOSPITAL OBSERVATION PER HR: HCPCS

## 2023-04-09 PROCEDURE — 80048 BASIC METABOLIC PNL TOTAL CA: CPT | Performed by: STUDENT IN AN ORGANIZED HEALTH CARE EDUCATION/TRAINING PROGRAM

## 2023-04-09 PROCEDURE — 96376 TX/PRO/DX INJ SAME DRUG ADON: CPT

## 2023-04-09 PROCEDURE — 25010000002 ENOXAPARIN PER 10 MG: Performed by: STUDENT IN AN ORGANIZED HEALTH CARE EDUCATION/TRAINING PROGRAM

## 2023-04-09 PROCEDURE — 25010000002 METHYLPREDNISOLONE PER 40 MG: Performed by: STUDENT IN AN ORGANIZED HEALTH CARE EDUCATION/TRAINING PROGRAM

## 2023-04-09 PROCEDURE — 94760 N-INVAS EAR/PLS OXIMETRY 1: CPT

## 2023-04-09 RX ORDER — INSULIN ASPART 100 [IU]/ML
0-9 INJECTION, SOLUTION INTRAVENOUS; SUBCUTANEOUS
Status: DISCONTINUED | OUTPATIENT
Start: 2023-04-09 | End: 2023-04-10 | Stop reason: HOSPADM

## 2023-04-09 RX ORDER — GUAIFENESIN 600 MG/1
TABLET, EXTENDED RELEASE ORAL 2 TIMES DAILY
COMMUNITY

## 2023-04-09 RX ORDER — DEXTROSE MONOHYDRATE 25 G/50ML
25 INJECTION, SOLUTION INTRAVENOUS
Status: DISCONTINUED | OUTPATIENT
Start: 2023-04-09 | End: 2023-04-10 | Stop reason: HOSPADM

## 2023-04-09 RX ORDER — METHYLPREDNISOLONE SODIUM SUCCINATE 40 MG/ML
40 INJECTION, POWDER, LYOPHILIZED, FOR SOLUTION INTRAMUSCULAR; INTRAVENOUS EVERY 12 HOURS
Status: DISCONTINUED | OUTPATIENT
Start: 2023-04-09 | End: 2023-04-10 | Stop reason: HOSPADM

## 2023-04-09 RX ORDER — NICOTINE POLACRILEX 4 MG
15 LOZENGE BUCCAL
Status: DISCONTINUED | OUTPATIENT
Start: 2023-04-09 | End: 2023-04-10 | Stop reason: HOSPADM

## 2023-04-09 RX ORDER — DIPHENHYDRAMINE HCL 25 MG
25 CAPSULE ORAL NIGHTLY PRN
Status: DISCONTINUED | OUTPATIENT
Start: 2023-04-09 | End: 2023-04-10 | Stop reason: HOSPADM

## 2023-04-09 RX ORDER — GUAIFENESIN 600 MG/1
600 TABLET, EXTENDED RELEASE ORAL 2 TIMES DAILY
Status: DISCONTINUED | OUTPATIENT
Start: 2023-04-09 | End: 2023-04-10 | Stop reason: HOSPADM

## 2023-04-09 RX ORDER — POLYETHYLENE GLYCOL 3350 17 G/17G
17 POWDER, FOR SOLUTION ORAL DAILY
Status: DISCONTINUED | OUTPATIENT
Start: 2023-04-09 | End: 2023-04-10 | Stop reason: HOSPADM

## 2023-04-09 RX ADMIN — BUDESONIDE, GLYCOPYRROLATE, AND FORMOTEROL FUMARATE 2 PUFF: 160; 9; 4.8 AEROSOL, METERED RESPIRATORY (INHALATION) at 21:28

## 2023-04-09 RX ADMIN — GUAIFENESIN AND CODEINE PHOSPHATE 10 ML: 100; 10 SOLUTION ORAL at 04:25

## 2023-04-09 RX ADMIN — POTASSIUM CITRATE 10 MEQ: 10 TABLET, EXTENDED RELEASE ORAL at 09:14

## 2023-04-09 RX ADMIN — BUDESONIDE, GLYCOPYRROLATE, AND FORMOTEROL FUMARATE 2 PUFF: 160; 9; 4.8 AEROSOL, METERED RESPIRATORY (INHALATION) at 09:22

## 2023-04-09 RX ADMIN — IPRATROPIUM BROMIDE AND ALBUTEROL SULFATE 3 ML: .5; 3 SOLUTION RESPIRATORY (INHALATION) at 19:51

## 2023-04-09 RX ADMIN — IPRATROPIUM BROMIDE 2 SPRAY: 42 SPRAY, METERED NASAL at 11:59

## 2023-04-09 RX ADMIN — METHYLPREDNISOLONE SODIUM SUCCINATE 40 MG: 40 INJECTION, POWDER, FOR SOLUTION INTRAMUSCULAR; INTRAVENOUS at 20:55

## 2023-04-09 RX ADMIN — BUPROPION HYDROCHLORIDE 150 MG: 150 TABLET, EXTENDED RELEASE ORAL at 09:14

## 2023-04-09 RX ADMIN — Medication 10 ML: at 20:56

## 2023-04-09 RX ADMIN — LEVOTHYROXINE SODIUM 50 MCG: 0.05 TABLET ORAL at 09:14

## 2023-04-09 RX ADMIN — Medication 2 TABLET: at 11:59

## 2023-04-09 RX ADMIN — ACETAMINOPHEN AND CODEINE PHOSPHATE 1 TABLET: 300; 30 TABLET ORAL at 12:03

## 2023-04-09 RX ADMIN — CETIRIZINE HYDROCHLORIDE 10 MG: 10 TABLET, FILM COATED ORAL at 09:14

## 2023-04-09 RX ADMIN — MONTELUKAST 10 MG: 10 TABLET, FILM COATED ORAL at 20:55

## 2023-04-09 RX ADMIN — ALLOPURINOL 100 MG: 100 TABLET ORAL at 09:13

## 2023-04-09 RX ADMIN — GUAIFENESIN AND CODEINE PHOSPHATE 10 ML: 100; 10 SOLUTION ORAL at 20:55

## 2023-04-09 RX ADMIN — POLYETHYLENE GLYCOL 3350 17 G: 17 POWDER, FOR SOLUTION ORAL at 11:58

## 2023-04-09 RX ADMIN — DIPHENHYDRAMINE HYDROCHLORIDE 25 MG: 25 CAPSULE ORAL at 00:16

## 2023-04-09 RX ADMIN — INSULIN ASPART 2 UNITS: 100 INJECTION, SOLUTION INTRAVENOUS; SUBCUTANEOUS at 16:33

## 2023-04-09 RX ADMIN — GUAIFENESIN 600 MG: 600 TABLET, EXTENDED RELEASE ORAL at 09:42

## 2023-04-09 RX ADMIN — AZITHROMYCIN MONOHYDRATE 250 MG: 250 TABLET ORAL at 09:14

## 2023-04-09 RX ADMIN — ACETAMINOPHEN 650 MG: 325 TABLET, FILM COATED ORAL at 05:41

## 2023-04-09 RX ADMIN — ISOSORBIDE MONONITRATE 30 MG: 30 TABLET, EXTENDED RELEASE ORAL at 06:46

## 2023-04-09 RX ADMIN — DICYCLOMINE HYDROCHLORIDE 10 MG: 10 CAPSULE ORAL at 09:13

## 2023-04-09 RX ADMIN — METHYLPREDNISOLONE SODIUM SUCCINATE 40 MG: 40 INJECTION, POWDER, FOR SOLUTION INTRAMUSCULAR; INTRAVENOUS at 00:16

## 2023-04-09 RX ADMIN — GUAIFENESIN AND CODEINE PHOSPHATE 10 ML: 100; 10 SOLUTION ORAL at 12:03

## 2023-04-09 RX ADMIN — Medication 10 ML: at 09:15

## 2023-04-09 RX ADMIN — CLONAZEPAM 0.5 MG: 0.5 TABLET ORAL at 20:55

## 2023-04-09 RX ADMIN — BUPROPION HYDROCHLORIDE 150 MG: 150 TABLET, EXTENDED RELEASE ORAL at 20:55

## 2023-04-09 RX ADMIN — BUDESONIDE, GLYCOPYRROLATE, AND FORMOTEROL FUMARATE 2 PUFF: 160; 9; 4.8 AEROSOL, METERED RESPIRATORY (INHALATION) at 19:46

## 2023-04-09 RX ADMIN — FLUTICASONE PROPIONATE 1 SPRAY: 50 SPRAY, METERED NASAL at 09:22

## 2023-04-09 RX ADMIN — DIPHENHYDRAMINE HYDROCHLORIDE 25 MG: 25 CAPSULE ORAL at 21:28

## 2023-04-09 RX ADMIN — INSULIN ASPART 2 UNITS: 100 INJECTION, SOLUTION INTRAVENOUS; SUBCUTANEOUS at 09:42

## 2023-04-09 RX ADMIN — VALSARTAN 160 MG: 80 TABLET, FILM COATED ORAL at 09:13

## 2023-04-09 RX ADMIN — ALBUTEROL SULFATE 2.5 MG: 2.5 SOLUTION RESPIRATORY (INHALATION) at 01:55

## 2023-04-09 RX ADMIN — TIZANIDINE 4 MG: 4 TABLET ORAL at 20:55

## 2023-04-09 RX ADMIN — PANTOPRAZOLE SODIUM 40 MG: 40 TABLET, DELAYED RELEASE ORAL at 06:46

## 2023-04-09 RX ADMIN — GUAIFENESIN 600 MG: 600 TABLET, EXTENDED RELEASE ORAL at 20:55

## 2023-04-09 RX ADMIN — ENOXAPARIN SODIUM 40 MG: 100 INJECTION SUBCUTANEOUS at 09:14

## 2023-04-09 RX ADMIN — DICYCLOMINE HYDROCHLORIDE 10 MG: 10 CAPSULE ORAL at 16:33

## 2023-04-09 RX ADMIN — FERROUS SULFATE TAB EC 324 MG (65 MG FE EQUIVALENT) 324 MG: 324 (65 FE) TABLET DELAYED RESPONSE at 09:14

## 2023-04-09 RX ADMIN — ACETAMINOPHEN AND CODEINE PHOSPHATE 1 TABLET: 300; 30 TABLET ORAL at 21:28

## 2023-04-09 RX ADMIN — METHYLPREDNISOLONE SODIUM SUCCINATE 40 MG: 40 INJECTION, POWDER, FOR SOLUTION INTRAMUSCULAR; INTRAVENOUS at 09:13

## 2023-04-09 NOTE — CASE MANAGEMENT/SOCIAL WORK
Discharge Planning Assessment   Rich     Patient Name: Raquel Mariee  MRN: [id number]  Today's Date: 4/9/2023    Admit Date: 4/8/2023    Plan: DCP done at bedside with pt and Bashir Mariee, spouse. Pt provided demographics. Her primary is Dr. Sanjeev Rawls and pharmacy is [facility]. Her POA and LW are on file. She has not been an inpatient in past 30 days. Home DME includes nebulizer, pulse ox, bp cuff, and scales. Financial concerns voiced concerning medical bills; pt has auto immune disorder that requires expensive monthly infusions. She and spouse have been at current address for 2.5 yrs.They share household duties. Her dcp is to return home with spouse driving. Anticipate new O2 order.   Discharge Needs Assessment     Row Name 04/09/23 1319       Living Environment    People in Home spouse    Current Living Arrangements home    Potentially Unsafe Housing Conditions none    Primary Care Provided by self    Family Caregiver if Needed spouse    Quality of Family Relationships helpful;involved    Able to Return to Prior Arrangements yes       Resource/Environmental Concerns    Transportation Concerns none       Food Insecurity    Within the past 12 months, you worried that your food would run out before you got the money to buy more. Never true    Within the past 12 months, the food you bought just didn't last and you didn't have money to get more. Never true       Transition Planning    Patient/Family Anticipates Transition to home with family    Patient/Family Anticipated Services at Transition none    Transportation Anticipated family or friend will provide       Discharge Needs Assessment    Readmission Within the Last 30 Days no previous admission in last 30 days    Equipment Currently Used at Home nebulizer;bp cuff;pulse ox;scales    Concerns to be Addressed financial/insurance    Concerns Comments pt recieves monthly infusions for immune disorder not covered by ins    Anticipated Changes Related to  Illness none    Equipment Needed After Discharge oxygen               Discharge Plan     Row Name 04/09/23 8128       Plan    Plan DCP done at bedside with pt and Bashir Mariee, spouse. Pt provided demographics. Her primary is Dr. Sanjeev Rawls and pharmacy is [facility]. Her POA and LW are on file. She has not been an inpatient in past 30 days. Home DME includes nebulizer, pulse ox, bp cuff, and scales. Financial concerns voiced concerning medical bills; pt has auto immune disorder that requires expensive monthly infusions. She and spouse have been at current address for 2.5 yrs.They share household duties. Her dcp is to return home with spouse driving. Anticipate new O2 order.              Continued Care and Services - Admitted Since 4/8/2023    Coordination has not been started for this encounter.          Demographic Summary     Row Name 04/09/23 4863       General Information    Admission Type observation    Arrived From emergency department    Required Notices Provided Observation Status Notice    Referral Source admission list    Reason for Consult discharge planning    Preferred Language English       Contact Information    Permission Granted to Share Info With family/designee               Functional Status     Row Name 04/09/23 2646       Functional Status    Usual Activity Tolerance moderate    Current Activity Tolerance fair       Functional Status, IADL    Medications independent    Meal Preparation independent    Housekeeping independent    Laundry independent    Shopping independent       Mental Status    General Appearance WDL appearance  ill       Mental Status Summary    Recent Changes in Mental Status/Cognitive Functioning no changes       Employment/    Employment Status disabled               Psychosocial    No documentation.                Abuse/Neglect    No documentation.                Legal    No documentation.                Substance Abuse    No documentation.                Patient  Forms    No documentation.                   Melissa Zayas RN

## 2023-04-09 NOTE — PROGRESS NOTES
[facility]    Name:  Raquel Mariee   Age:  67 y.o.  Sex:  female  :  1955  MRN:  [id number]   Visit Number:  [id number]  Admission Date:  2023  Date Of Service:  23  Primary Care Physician:  Sanjeev Rawls MD     LOS: 1 day :    Chief Complaint:      Follow-up; shortness of air, cough.    Subjective:    Did not sleep much due to cough.  No chest pain.  Would like to stay 1 more day, possibly home tomorrow.    Hospital Course:    Patient is a 67-year-old woman with past medical history of eosinophilic asthma, aortic valve stenosis, CKD, diabetes diet-controlled, fibromyalgia, hypertension, hypothyroidism, IBS, migraines, obesity BMI 35, osteoarthritis, PTSD, history of Pseudomonas infection, sleep apnea, TIA, trochanteric bursitis.  Also reported history of tracheal reconstruction surgery.  Presented to [facility] ED on 2023 with concern for cough and wheezing for 3 days.  Recently was evaluated and provided steroids, nebulizer, antibiotics.  Coughing got worse night prior to presentation, was unable to sleep all night due to cough.  Denied chest pain.     ED summary: Afebrile, tachycardic, vital signs otherwise stable on 2 L nasal cannula.  No previous home O2 requirement.  EKG sinus rhythm, no ST elevations or depressions.  High-sensitivity troponin slightly elevated 19, no chest pain, ACS not suspected, likely demand ischemia with hypoxia.  proBNP now elevated.  Creatinine 1.08.  CBC unremarkable.  COVID/flu negative.  Respiratory PCR panel positive for parainfluenza virus 3.  CXR stable chest.     Observation admission 2023 with hypoxia secondary to parainfluenza virus infection with asthma exacerbation.    Review of Systems:     All systems were reviewed and negative except as mentioned in subjective, assessment and plan.    Vital Signs:    Temp:  [97.8 °F (36.6 °C)-98.1 °F (36.7 °C)] 97.9 °F (36.6 °C)  Heart Rate:  [] 82  Resp:   [18-22] 20  BP: (105-149)/(57-91) 149/85    Intake and output:    I/O last 3 completed shifts:  In: 530 [P.O.:480; I.V.:50]  Out: -   I/O this shift:  In: 480 [P.O.:480]  Out: -     Physical Examination:    General Appearance:  Alert and cooperative.  No apparent distress   Head:  Atraumatic and normocephalic.   Eyes: Conjunctivae and sclerae normal, no icterus. No pallor.   Throat: No oral lesions, no thrush, oral mucosa moist.   Neck: Supple, trachea midline, no thyromegaly.   Lungs:    Expiratory coarse sounds without wheezing, moderately diminished all fields   Heart:  Normal S1 and S2, no murmur, no gallop, no rub. No JVD.   Abdomen:   Normal bowel sounds, no masses, no organomegaly. Soft, nontender, nondistended, no rebound tenderness.   Extremities: Supple, no edema, no cyanosis, no clubbing.   Skin:  Warm.   Neurologic: Alert and oriented x 3. No facial asymmetry. Moves all four limbs. No tremors.      Laboratory results:    Results from last 7 days   Lab Units 04/09/23  0525 04/08/23  0643   SODIUM mmol/L 134* 136   POTASSIUM mmol/L 4.6 3.8   CHLORIDE mmol/L 100 101   CO2 mmol/L 24.6 26.1   BUN mg/dL 12 13   CREATININE mg/dL 0.94 1.08*   CALCIUM mg/dL 8.7 8.7   BILIRUBIN mg/dL  --  0.3   ALK PHOS U/L  --  61   ALT (SGPT) U/L  --  21   AST (SGOT) U/L  --  28   GLUCOSE mg/dL 162* 95     Results from last 7 days   Lab Units 04/09/23  0525 04/08/23  0643   WBC 10*3/mm3 4.94 5.97   HEMOGLOBIN g/dL 11.7* 12.1   HEMATOCRIT % 34.5 34.5   PLATELETS 10*3/mm3 185 191         Results from last 7 days   Lab Units 04/08/23  1418 04/08/23  1224 04/08/23  0643   HSTROP T ng/L 17* 19* 19*         No results for input(s): PHART, LGL4ZAX, PO2ART, BBJ5TVW, BASEEXCESS in the last 8760 hours.   I have reviewed the patient's laboratory results.    Radiology results:    XR Chest 1 View    Result Date: 4/8/2023  PROCEDURE: XR CHEST 1 VW-  HISTORY: [facility]A Triage Protocol  COMPARISON: July 22, 2022.  FINDINGS: The heart is normal in  size. Left subclavian port is stable in position. Left lung is clear. Postsurgical change again noted in the right hemithorax, stable from prior exam. No acute infiltrate is seen.. The mediastinum is unremarkable. There is no pneumothorax.  There are no acute osseous abnormalities. Apical lordotic positioning noted.      Impression: Stable chest.    This report was signed and finalized on 4/8/2023 7:45 AM by Lisa Castellon MD.    I have reviewed the patient's radiology reports.    Medication Review:     I have reviewed the patient's active and prn medications.     Problem List:      Acute exacerbation of chronic obstructive pulmonary disease (COPD)      Assessment/Plan:    Afebrile, vital signs stable on 1 L nasal cannula.  Appeared comfortable during exam, did hear some moderate barking cough.  No respiratory distress.  Lab work unremarkable.  Likely can be discharged next day.     Hypoxia  Parainfluenza virus  Asthma exacerbation  Supplemental oxygen as needed keep saturation above 90%.    Solu-Medrol IV, taper.  She will go home with prednisone when stable.  Continue home inhalers. Continue azithromycin 250 mg daily, patient was already on this for 3 days prior to presentation.     Patient reports that she has needed oxygen with asthma exacerbations in the past.  She used to get these a few times a year, has not had an episode in a couple years since she moved to [place] from [place].     Elevated troponin   Troponins trended down, no chest pain, ACS not suspected, likely demand ischemia with hypoxia.     Chronic:  eosinophilic asthma, aortic valve stenosis, CKD, diabetes diet-controlled, fibromyalgia, hypertension, hypothyroidism, IBS, migraines, obesity BMI 35, osteoarthritis, PTSD, history of Pseudomonas infection, sleep apnea, TIA, trochanteric bursitis.      Continue home medications.  She has some home inhalers and other medications that  will bring in, nurse will enter into system for physician  review and approval as appropriate.     Risk Assessment: High  DVT Prophylaxis: Lovenox  Code Status: Full code  Diet: Cardiac/carbohydrate controlled  Discharge Plan: Likely DC home 4/10, may need oxygen.    Brian Joseph Kerley, DO  04/09/23  15:29 EDT    Dictated utilizing Dragon dictation.

## 2023-04-09 NOTE — PLAN OF CARE
Goal Outcome Evaluation:  Plan of Care Reviewed With: patient        Progress: improving  Outcome Evaluation: VSS.  Pt on 1L O2 nc.  No changes in pt condition to report.  Will monitor.

## 2023-04-09 NOTE — PLAN OF CARE
Goal Outcome Evaluation:  Plan of Care Reviewed With: patient        Progress: improving  Outcome Evaluation: no acute events during shift. VSS, SR on tele. 1L nc. PRN breathing tx and sleep aids given. no other chanes in pt condition. will continue to monitor.

## 2023-04-10 ENCOUNTER — TELEPHONE (OUTPATIENT)
Dept: UROLOGY | Facility: CLINIC | Age: 68
End: 2023-04-10
Payer: MEDICARE

## 2023-04-10 ENCOUNTER — READMISSION MANAGEMENT (OUTPATIENT)
Dept: CALL CENTER | Facility: HOSPITAL | Age: 68
End: 2023-04-10
Payer: MEDICARE

## 2023-04-10 VITALS
SYSTOLIC BLOOD PRESSURE: 144 MMHG | HEIGHT: 64 IN | HEART RATE: 76 BPM | DIASTOLIC BLOOD PRESSURE: 75 MMHG | TEMPERATURE: 97.5 F | OXYGEN SATURATION: 97 % | BODY MASS INDEX: 36.02 KG/M2 | RESPIRATION RATE: 17 BRPM | WEIGHT: 210.98 LBS

## 2023-04-10 LAB
ANION GAP SERPL CALCULATED.3IONS-SCNC: 10.6 MMOL/L (ref 5–15)
BASOPHILS # BLD AUTO: 0.02 10*3/MM3 (ref 0–0.2)
BASOPHILS NFR BLD AUTO: 0.3 % (ref 0–1.5)
BUN SERPL-MCNC: 15 MG/DL (ref 8–23)
BUN/CREAT SERPL: 15.8 (ref 7–25)
CALCIUM SPEC-SCNC: 8.9 MG/DL (ref 8.6–10.5)
CHLORIDE SERPL-SCNC: 101 MMOL/L (ref 98–107)
CO2 SERPL-SCNC: 22.4 MMOL/L (ref 22–29)
CREAT SERPL-MCNC: 0.95 MG/DL (ref 0.57–1)
DEPRECATED RDW RBC AUTO: 43.6 FL (ref 37–54)
EGFRCR SERPLBLD CKD-EPI 2021: 65.8 ML/MIN/1.73
EOSINOPHIL # BLD AUTO: 0.01 10*3/MM3 (ref 0–0.4)
EOSINOPHIL NFR BLD AUTO: 0.2 % (ref 0.3–6.2)
ERYTHROCYTE [DISTWIDTH] IN BLOOD BY AUTOMATED COUNT: 12.7 % (ref 12.3–15.4)
GLUCOSE BLDC GLUCOMTR-MCNC: 102 MG/DL (ref 70–130)
GLUCOSE BLDC GLUCOMTR-MCNC: 154 MG/DL (ref 70–130)
GLUCOSE SERPL-MCNC: 132 MG/DL (ref 65–99)
HCT VFR BLD AUTO: 35.6 % (ref 34–46.6)
HGB BLD-MCNC: 12.2 G/DL (ref 12–15.9)
IMM GRANULOCYTES # BLD AUTO: 0.04 10*3/MM3 (ref 0–0.05)
IMM GRANULOCYTES NFR BLD AUTO: 0.6 % (ref 0–0.5)
LYMPHOCYTES # BLD AUTO: 0.8 10*3/MM3 (ref 0.7–3.1)
LYMPHOCYTES NFR BLD AUTO: 12.3 % (ref 19.6–45.3)
MCH RBC QN AUTO: 32 PG (ref 26.6–33)
MCHC RBC AUTO-ENTMCNC: 34.3 G/DL (ref 31.5–35.7)
MCV RBC AUTO: 93.4 FL (ref 79–97)
MONOCYTES # BLD AUTO: 0.4 10*3/MM3 (ref 0.1–0.9)
MONOCYTES NFR BLD AUTO: 6.1 % (ref 5–12)
NEUTROPHILS NFR BLD AUTO: 5.24 10*3/MM3 (ref 1.7–7)
NEUTROPHILS NFR BLD AUTO: 80.5 % (ref 42.7–76)
NRBC BLD AUTO-RTO: 0 /100 WBC (ref 0–0.2)
PLATELET # BLD AUTO: 200 10*3/MM3 (ref 140–450)
PMV BLD AUTO: 9.2 FL (ref 6–12)
POTASSIUM SERPL-SCNC: 4.8 MMOL/L (ref 3.5–5.2)
RBC # BLD AUTO: 3.81 10*6/MM3 (ref 3.77–5.28)
SODIUM SERPL-SCNC: 134 MMOL/L (ref 136–145)
WBC NRBC COR # BLD: 6.51 10*3/MM3 (ref 3.4–10.8)

## 2023-04-10 PROCEDURE — 82962 GLUCOSE BLOOD TEST: CPT

## 2023-04-10 PROCEDURE — 63710000001 INSULIN ASPART PER 5 UNITS: Performed by: STUDENT IN AN ORGANIZED HEALTH CARE EDUCATION/TRAINING PROGRAM

## 2023-04-10 PROCEDURE — 85025 COMPLETE CBC W/AUTO DIFF WBC: CPT | Performed by: STUDENT IN AN ORGANIZED HEALTH CARE EDUCATION/TRAINING PROGRAM

## 2023-04-10 PROCEDURE — 80048 BASIC METABOLIC PNL TOTAL CA: CPT | Performed by: STUDENT IN AN ORGANIZED HEALTH CARE EDUCATION/TRAINING PROGRAM

## 2023-04-10 PROCEDURE — 96372 THER/PROPH/DIAG INJ SC/IM: CPT

## 2023-04-10 PROCEDURE — G0378 HOSPITAL OBSERVATION PER HR: HCPCS

## 2023-04-10 PROCEDURE — 25010000002 ENOXAPARIN PER 10 MG: Performed by: STUDENT IN AN ORGANIZED HEALTH CARE EDUCATION/TRAINING PROGRAM

## 2023-04-10 PROCEDURE — 25010000002 METHYLPREDNISOLONE PER 40 MG: Performed by: STUDENT IN AN ORGANIZED HEALTH CARE EDUCATION/TRAINING PROGRAM

## 2023-04-10 PROCEDURE — 96376 TX/PRO/DX INJ SAME DRUG ADON: CPT

## 2023-04-10 PROCEDURE — 25010000002 HEPARIN LOCK FLUSH PER 10 UNITS: Performed by: INTERNAL MEDICINE

## 2023-04-10 RX ORDER — SODIUM CHLORIDE 0.9 % (FLUSH) 0.9 %
20 SYRINGE (ML) INJECTION AS NEEDED
Status: DISCONTINUED | OUTPATIENT
Start: 2023-04-10 | End: 2023-04-10 | Stop reason: HOSPADM

## 2023-04-10 RX ORDER — SODIUM CHLORIDE 0.9 % (FLUSH) 0.9 %
10 SYRINGE (ML) INJECTION EVERY 12 HOURS SCHEDULED
Status: DISCONTINUED | OUTPATIENT
Start: 2023-04-10 | End: 2023-04-10 | Stop reason: HOSPADM

## 2023-04-10 RX ORDER — HEPARIN SODIUM (PORCINE) LOCK FLUSH IV SOLN 100 UNIT/ML 100 UNIT/ML
100 SOLUTION INTRAVENOUS ONCE
Status: COMPLETED | OUTPATIENT
Start: 2023-04-10 | End: 2023-04-10

## 2023-04-10 RX ORDER — HEPARIN SODIUM (PORCINE) LOCK FLUSH IV SOLN 100 UNIT/ML 100 UNIT/ML
5 SOLUTION INTRAVENOUS AS NEEDED
Status: DISCONTINUED | OUTPATIENT
Start: 2023-04-10 | End: 2023-04-10 | Stop reason: HOSPADM

## 2023-04-10 RX ORDER — PREDNISONE 20 MG/1
40 TABLET ORAL DAILY
Qty: 10 TABLET | Refills: 0 | Status: SHIPPED | OUTPATIENT
Start: 2023-04-10 | End: 2023-04-15

## 2023-04-10 RX ORDER — SODIUM CHLORIDE 9 MG/ML
40 INJECTION, SOLUTION INTRAVENOUS AS NEEDED
Status: DISCONTINUED | OUTPATIENT
Start: 2023-04-10 | End: 2023-04-10 | Stop reason: HOSPADM

## 2023-04-10 RX ORDER — SODIUM CHLORIDE 0.9 % (FLUSH) 0.9 %
10 SYRINGE (ML) INJECTION AS NEEDED
Status: DISCONTINUED | OUTPATIENT
Start: 2023-04-10 | End: 2023-04-10 | Stop reason: HOSPADM

## 2023-04-10 RX ADMIN — Medication 2 TABLET: at 09:10

## 2023-04-10 RX ADMIN — INSULIN ASPART 2 UNITS: 100 INJECTION, SOLUTION INTRAVENOUS; SUBCUTANEOUS at 06:38

## 2023-04-10 RX ADMIN — CETIRIZINE HYDROCHLORIDE 10 MG: 10 TABLET, FILM COATED ORAL at 09:12

## 2023-04-10 RX ADMIN — BUDESONIDE, GLYCOPYRROLATE, AND FORMOTEROL FUMARATE 2 PUFF: 160; 9; 4.8 AEROSOL, METERED RESPIRATORY (INHALATION) at 09:10

## 2023-04-10 RX ADMIN — BUPROPION HYDROCHLORIDE 150 MG: 150 TABLET, EXTENDED RELEASE ORAL at 09:12

## 2023-04-10 RX ADMIN — ISOSORBIDE MONONITRATE 30 MG: 30 TABLET, EXTENDED RELEASE ORAL at 06:38

## 2023-04-10 RX ADMIN — POLYETHYLENE GLYCOL 3350 17 G: 17 POWDER, FOR SOLUTION ORAL at 09:09

## 2023-04-10 RX ADMIN — VALSARTAN 160 MG: 80 TABLET, FILM COATED ORAL at 09:12

## 2023-04-10 RX ADMIN — ENOXAPARIN SODIUM 40 MG: 100 INJECTION SUBCUTANEOUS at 09:11

## 2023-04-10 RX ADMIN — GUAIFENESIN 600 MG: 600 TABLET, EXTENDED RELEASE ORAL at 09:11

## 2023-04-10 RX ADMIN — POTASSIUM CITRATE 10 MEQ: 10 TABLET, EXTENDED RELEASE ORAL at 09:11

## 2023-04-10 RX ADMIN — IPRATROPIUM BROMIDE 2 SPRAY: 42 SPRAY, METERED NASAL at 05:06

## 2023-04-10 RX ADMIN — DICYCLOMINE HYDROCHLORIDE 10 MG: 10 CAPSULE ORAL at 09:11

## 2023-04-10 RX ADMIN — FERROUS SULFATE TAB EC 324 MG (65 MG FE EQUIVALENT) 324 MG: 324 (65 FE) TABLET DELAYED RESPONSE at 09:11

## 2023-04-10 RX ADMIN — METHYLPREDNISOLONE SODIUM SUCCINATE 40 MG: 40 INJECTION, POWDER, FOR SOLUTION INTRAMUSCULAR; INTRAVENOUS at 09:11

## 2023-04-10 RX ADMIN — Medication 10 ML: at 09:11

## 2023-04-10 RX ADMIN — LEVOTHYROXINE SODIUM 50 MCG: 0.05 TABLET ORAL at 09:12

## 2023-04-10 RX ADMIN — GUAIFENESIN AND CODEINE PHOSPHATE 10 ML: 100; 10 SOLUTION ORAL at 09:12

## 2023-04-10 RX ADMIN — PANTOPRAZOLE SODIUM 40 MG: 40 TABLET, DELAYED RELEASE ORAL at 06:38

## 2023-04-10 RX ADMIN — FLUTICASONE PROPIONATE 1 SPRAY: 50 SPRAY, METERED NASAL at 09:10

## 2023-04-10 RX ADMIN — ALLOPURINOL 100 MG: 100 TABLET ORAL at 09:12

## 2023-04-10 RX ADMIN — HEPARIN 100 UNITS: 100 SYRINGE at 11:03

## 2023-04-10 NOTE — CASE MANAGEMENT/SOCIAL WORK
Case Management Discharge Note                Selected Continued Care - Discharged on 4/10/2023 Admission date: 4/8/2023 - Discharge disposition: Home or Self Care    Destination    No services have been selected for the patient.              Durable Medical Equipment    No services have been selected for the patient.              Dialysis/Infusion    No services have been selected for the patient.              Home Medical Care    No services have been selected for the patient.              Therapy    No services have been selected for the patient.              Community Resources    No services have been selected for the patient.              Community & DME    No services have been selected for the patient.                  Transportation Services  Private: Car    Final Discharge Disposition Code: 01 - home or self-care

## 2023-04-10 NOTE — PLAN OF CARE
Goal Outcome Evaluation:  Plan of Care Reviewed With: patient        Progress: improving  Outcome Evaluation: no acute events during shift. VSS, SR on tele. 1L nc. no other changes in pt condition. will continue to monitor.

## 2023-04-10 NOTE — DISCHARGE SUMMARY
[facility]   DISCHARGE SUMMARY      Name:  Raquel Mariee   Age:  67 y.o.  Sex:  female  :  1955  MRN:  [id number]   Visit Number:  [id number]    Admission Date:  2023  Date of Discharge:  4/10/2023  Primary Care Physician:  Sanjeev Rawls MD      Discharge Diagnoses:     Acute hypoxic respiratory failure  Acute parainfluenza virus  Acute asthma/COPD exacerbation    Problem List:     Active Hospital Problems    Diagnosis  POA   • **Acute exacerbation of chronic obstructive pulmonary disease (COPD) [J44.1]  Yes      Resolved Hospital Problems   No resolved problems to display.     Presenting Problem:    Chief Complaint   Patient presents with   • Shortness of Breath   • Cough      Consults:     Consulting Physician(s)             None          Procedures Performed:        History of presenting illness/Hospital Course:    Patient is a 67-year-old woman with past medical history of eosinophilic asthma, aortic valve stenosis, CKD, diabetes diet-controlled, fibromyalgia, hypertension, hypothyroidism, IBS, migraines, obesity BMI 35, osteoarthritis, PTSD, history of Pseudomonas infection, sleep apnea, TIA, trochanteric bursitis.  Also reported history of tracheal reconstruction surgery.  Presented to [facility] ED on 2023 with concern for cough and wheezing for 3 days.  Recently was evaluated and provided steroids, nebulizer, antibiotics.  Coughing got worse night prior to presentation, was unable to sleep all night due to cough.  Denied chest pain.     ED summary: Afebrile, tachycardic, vital signs otherwise stable on 2 L nasal cannula.  No previous home O2 requirement.  EKG sinus rhythm, no ST elevations or depressions.  High-sensitivity troponin slightly elevated 19, no chest pain, ACS not suspected, likely demand ischemia with hypoxia.  proBNP now elevated.  Creatinine 1.08.  CBC unremarkable.  COVID/flu negative.  Respiratory PCR panel positive for parainfluenza virus 3.   CXR stable chest.     Observation admission 4/8/2023 with hypoxia secondary to parainfluenza virus infection with asthma exacerbation.  Patient was able to wean off supplemental oxygen and was maintaining appropriate saturation on room air.  Patient completed course of azithromycin while inpatient as this was also previously started prior to her hospitalization.  She was treated with IV Solu-Medrol which was transitioned to p.o. prednisone at discharge.  Follow-up at the COPD disease state management clinic as well as with primary physician.    Vital Signs:    Temp:  [97.4 °F (36.3 °C)-98.2 °F (36.8 °C)] 97.5 °F (36.4 °C)  Heart Rate:  [69-98] 76  Resp:  [17-20] 17  BP: (126-167)/(59-98) 144/75    Physical Exam:    General Appearance:  Alert and cooperative.    Head:  Atraumatic and normocephalic.   Eyes: Conjunctivae and sclerae normal, no icterus. No pallor.   Ears:  Ears with no abnormalities noted.   Throat: No oral lesions, no thrush, oral mucosa moist.   Neck: Supple, trachea midline, no thyromegaly.   Back:   No kyphoscoliosis present. No tenderness to palpation.   Lungs:   Breath sounds heard bilaterally equally.  No crackles or wheezing. No Pleural rub or bronchial breathing.   Heart:  Normal S1 and S2, no murmur, no gallop, no rub. No JVD.   Abdomen:   Normal bowel sounds, no masses, no organomegaly. Soft, nontender, nondistended, no rebound tenderness.   Extremities: Supple, no edema, no cyanosis, no clubbing.   Pulses: Pulses palpable bilaterally.   Skin: No bleeding or rash.   Neurologic: Alert and oriented x 3. No facial asymmetry. Moves all four limbs. No tremors.     Pertinent Lab Results:     Results from last 7 days   Lab Units 04/10/23  0602 04/09/23  0525 04/08/23  0643   SODIUM mmol/L 134* 134* 136   POTASSIUM mmol/L 4.8 4.6 3.8   CHLORIDE mmol/L 101 100 101   CO2 mmol/L 22.4 24.6 26.1   BUN mg/dL 15 12 13   CREATININE mg/dL 0.95 0.94 1.08*   CALCIUM mg/dL 8.9 8.7 8.7   BILIRUBIN mg/dL  --   --   0.3   ALK PHOS U/L  --   --  61   ALT (SGPT) U/L  --   --  21   AST (SGOT) U/L  --   --  28   GLUCOSE mg/dL 132* 162* 95     Results from last 7 days   Lab Units 04/10/23  0602 04/09/23  0525 04/08/23  0643   WBC 10*3/mm3 6.51 4.94 5.97   HEMOGLOBIN g/dL 12.2 11.7* 12.1   HEMATOCRIT % 35.6 34.5 34.5   PLATELETS 10*3/mm3 200 185 191         Results from last 7 days   Lab Units 04/08/23  1418 04/08/23  1224 04/08/23  0643   HSTROP T ng/L 17* 19* 19*     Results from last 7 days   Lab Units 04/08/23  0643   PROBNP pg/mL 688.5                       Pertinent Radiology Results:    Imaging Results (All)     Procedure Component Value Units Date/Time    XR Chest 1 View [[id number]] Collected: 04/08/23 0744     Updated: 04/08/23 0747    Narrative:      PROCEDURE: XR CHEST 1 VW-     HISTORY: SOA Triage Protocol     COMPARISON: July 22, 2022.     FINDINGS: The heart is normal in size. Left subclavian port is stable in  position. Left lung is clear. Postsurgical change again noted in the  right hemithorax, stable from prior exam. No acute infiltrate is seen..  The mediastinum is unremarkable. There is no pneumothorax.  There are no  acute osseous abnormalities. Apical lordotic positioning noted.        Impression:      Stable chest.           This report was signed and finalized on 4/8/2023 7:45 AM by Lisa Castellon MD.          Echo:    Results for orders placed in visit on 05/05/22    Adult Transthoracic Echo Complete W/ Cont if Necessary Per Protocol    Interpretation Summary  1.  Normal left ventricular size and systolic function, LVEF 65-70%.  2.  Mild concentric LVH.  3.  Indeterminate LV diastolic filling pattern.  4.  Normal right ventricular size and systolic function.  5.  Mildly increased left atrial volume index.  6.  Mild to moderate aortic stenosis (V-max 307 cm/s, mean gradient 17 mmHg, max 38 mmHg, DOMENIC 1.86 cm²).  7.  Trace MR and TR.    Condition on Discharge:      Stable.    Code status during the hospital  stay:    Code Status and Medical Interventions:   Ordered at: 04/08/23 0933     Code Status (Patient has no pulse and is not breathing):    CPR (Attempt to Resuscitate)     Medical Interventions (Patient has pulse or is breathing):    Full Support     Discharge Disposition:    Home or Self Care    Discharge Medications:       Discharge Medications      Changes to Medications      Instructions Start Date   predniSONE 20 MG tablet  Commonly known as: DELTASONE  What changed:   · medication strength  · how much to take  · how to take this  · when to take this  · additional instructions   40 mg, Oral, Daily         Continue These Medications      Instructions Start Date   acetaminophen-codeine 300-30 MG per tablet  Commonly known as: TYLENOL #3   1 tablet, Oral, Every 4 Hours PRN      AeroChamber Plus Wali-Vu misc   Use as directed with albuterol inhaler      AeroChamber Plus Wali-Vu misc   Use as directed with inhaler      albuterol sulfate  (90 Base) MCG/ACT inhaler  Commonly known as: PROVENTIL HFA;VENTOLIN HFA;PROAIR HFA   Inhale 2 puffs by mouth every 4 to 6 hours as needed for cough, wheeze, shortness of breath. Use with vortex spacer      allopurinol 100 MG tablet  Commonly known as: Zyloprim   100 mg, Oral, Daily      Antacid Maximum 1000 MG chewable tablet  Generic drug: calcium carbonate   500 mg, Oral, 3 Times Daily      betamethasone valerate 0.1 % cream  Commonly known as: VALISONE   Apply topically to the appropriate area as directed Daily.      Breztri Aerosphere 160-9-4.8 MCG/ACT aerosol inhaler  Generic drug: Budeson-Glycopyrrol-Formoterol   Inhale 2 puffs by mouth twice daily. Rinse mouth after use      buPROPion  MG 12 hr tablet  Commonly known as: WELLBUTRIN SR   150 mg, Oral, 2 Times Daily      cholecalciferol 25 MCG (1000 UT) tablet  Commonly known as: VITAMIN D3   1,000 Units, Oral, Daily      clonazePAM 0.5 MG tablet  Commonly known as: KlonoPIN   Take 1 tablet by mouth At Night As  Needed      dicyclomine 10 MG capsule  Commonly known as: BENTYL   10 mg, Oral, 3 Times Daily      diphenhydrAMINE 25 MG tablet  Commonly known as: Benadryl Allergy   Take 1-2 tablets by mouth Every 4-6 Hours As Needed.      estradiol 0.1 MG/GM vaginal cream  Commonly known as: ESTRACE   Apply externally as directed to urethra twice weekly at bedtime.      ferrous sulfate 325 (65 FE) MG EC tablet   325 mg, Oral      fexofenadine 180 MG tablet  Commonly known as: ALLEGRA   180 mg, Oral, Daily      furosemide 40 MG tablet  Commonly known as: LASIX   40 mg, Oral, Daily, Two days a month.      Gammagard 20 GM/200ML solution infusion  Generic drug: immune globulin (human)   40 g, Intravenous, Every 28 Days      glucosamine-chondroitin 500-400 MG capsule capsule   1 capsule, Oral, 2 Times Daily With Meals      guaiFENesin 600 MG 12 hr tablet  Commonly known as: MUCINEX   Oral, 2 Times Daily      guaiFENesin-codeine 100-10 MG/5ML solution/syrup  Commonly known as: ROMILAR-AC   Take 10 mL by mouth 4 (Four) Times a Day As Needed for Cough      ipratropium 0.06 % nasal spray  Commonly known as: ATROVENT   Instill 1-2 sprays each nostril 2-3 times daily as needed      ipratropium-albuterol 0.5-2.5 mg/3 ml nebulizer  Commonly known as: DUO-NEB   Inhale the contents of 1 vial into nebulizer every 4-6 hours as needed for cough,wheezing and shortness of breath.      isosorbide mononitrate 30 MG 24 hr tablet  Commonly known as: IMDUR   30 mg, Oral, Every Morning      levothyroxine 50 MCG tablet  Commonly known as: SYNTHROID, LEVOTHROID   50 mcg, Oral, Daily      Magnesium 250 MG tablet   500 mg, Oral, 2 Times Daily      montelukast 10 MG tablet  Commonly known as: SINGULAIR   10 mg, Oral, Every Night at Bedtime      multivitamin with minerals tablet tablet   1 tablet, Oral, Daily      omeprazole 40 MG capsule  Commonly known as: priLOSEC   40 mg, Oral, 2 Times Daily      ondansetron 4 MG tablet  Commonly known as: ZOFRAN   4 mg,  Oral, Every 8 Hours PRN      POTASSIUM CHLORIDE PO   20 mEq, Oral, See Admin Instructions, Takes two times a month with Lasix      potassium citrate 10 MEQ (1080 MG) CR tablet  Commonly known as: UROCIT-K   10 mEq, Oral, Daily      Rybelsus 7 MG tablet  Generic drug: Semaglutide   Take 1 tablet by mouth Daily.      Sinus Rinse Kit pack   use once or twice daily as needed      tiZANidine 4 MG tablet  Commonly known as: ZANAFLEX   4 mg, Oral, Nightly      ubrogepant 100 MG tablet  Commonly known as: Ubrelvy   100 mg, Oral, Once As Needed      valsartan 160 MG tablet  Commonly known as: DIOVAN   160 mg, Oral, Daily      vitamin B-12 1000 MCG tablet  Commonly known as: CYANOCOBALAMIN   1,000 mcg, Oral, Daily         Stop These Medications    azithromycin 250 MG tablet  Commonly known as: Zithromax Z-Russ        ASK your doctor about these medications      Instructions Start Date   fluticasone 50 MCG/ACT nasal spray  Commonly known as: FLONASE   Instill 2 sprays into each nostril daily.           Discharge Diet:     Diet Instructions     Diet: Cardiac Diets, Diabetic Diets; Healthy Heart (2-3 Na+); Regular Texture (IDDSI 7); Thin (IDDSI 0); Consistent Carbohydrate      Discharge Diet:  Cardiac Diets  Diabetic Diets       Cardiac Diet: Healthy Heart (2-3 Na+)    Texture: Regular Texture (IDDSI 7)    Fluid Consistency: Thin (IDDSI 0)    Diabetic Diet: Consistent Carbohydrate        Activity at Discharge:       Follow-up Appointments:    Additional Instructions for the Follow-ups that You Need to Schedule     Ambulatory Referral to Disease State Management   As directed      To dept: [facility] [[id number]]    What program(s) are you referring for?: COPD    Follow-up needed: Yes         Discharge Follow-up with PCP   As directed       Currently Documented PCP:    Sanjeev Rawls MD    PCP Phone Number:    [phone]     Follow Up Details: 1-2 weeks            Follow-up Information     [facility]  [facility] Follow up on 4/18/2023.    Specialty: Pharmacy  Why: 01:30pm  Contact information:  [address]  [facility] [address] [phone]                     Future Appointments   Date Time Provider Department Center   4/13/2023 10:30 AM Kings Up MD [facility] PC RI MR [facility]   4/18/2023  1:30 PM Leila Gloria APRN BH [facility]   4/20/2023  8:30 AM RM 2 - BED 1 [facility] INF [facility] OPINF [facility]   5/12/2023 10:20 AM Joel Griffin [facility]   5/18/2023  8:30 AM RM 3 - BED 1 [facility] INF [facility] OPINF [facility]   6/15/2023  8:30 AM RM 3 - BED 1 [facility] INF [facility] OPINF [facility]   6/20/2023  9:00 AM Sanjeev Rawls MD [facility] PC RI MR [facility]   7/13/2023  8:30 AM RM 4 - CHAIR 1 [facility] INF [facility] OPINF [facility]   8/1/2023  8:00 AM Sanjeev Rawls MD [facility] PC RI MR [facility]   8/2/2023 10:30 AM [facility] BG [facility] ECHO 16 [facility] BG C R P None   8/2/2023 11:45 AM Mingo Lovelace MD M[facility] CD BG R [facility]   8/10/2023  8:30 AM RM 2 - BED 1 [facility] INF BH [facility] OPINF [facility]   9/7/2023  8:30 AM RM 2 - BED 1 [facility] INF BH [facility] OPINF [facility]   10/5/2023  8:30 AM RM 1 - CHAIR 1 [facility] INF BH [facility] OPINF [facility]   11/2/2023  8:30 AM RM 2 - BED 1 [facility] INF BH [facility] OPINF [facility]   11/30/2023  8:30 AM RM 2 - BED 1 [facility] INF BH [facility] OPINF [facility]   12/28/2023  8:30 AM RM 2 - BED 1 [facility] INF BH [facility] OPINF [facility]   1/25/2024  8:30 AM RM 2 - BED 1 [facility] INF BH [facility] OPINF [facility]     Test Results Pending at Discharge:           Giovani Alvarez DO  04/10/23  12:16 EDT    Time: I spent >30 minutes on this discharge activity which included: face-to-face encounter with the patient, reviewing the data in the system, coordination of the care with the nursing staff as well as consultants, documentation, and entering orders.     Dictated utilizing Dragon dictation.

## 2023-04-10 NOTE — TELEPHONE ENCOUNTER
Provider: DR BENJAMIN Mcpherson: CHIQUI MILAN  Relationship to Patient: SELF  Reason for Call: PT SCHEDULED FOR SURGERY 4/14/23, SHE HAS BEEN IN ER WITH RESPIRATORY AND ASTHMA ISSUES.  PT WANTS TO CHECK TO SEE IF SURGERY WILL NEED TO BE RESCHEDULED.    PLEASE CALL TO DISCUSS.

## 2023-04-11 ENCOUNTER — TELEPHONE (OUTPATIENT)
Dept: INTERNAL MEDICINE | Facility: CLINIC | Age: 68
End: 2023-04-11
Payer: MEDICARE

## 2023-04-11 ENCOUNTER — TRANSITIONAL CARE MANAGEMENT TELEPHONE ENCOUNTER (OUTPATIENT)
Dept: CALL CENTER | Facility: HOSPITAL | Age: 68
End: 2023-04-11
Payer: MEDICARE

## 2023-04-11 NOTE — OUTREACH NOTE
Call Center TCM Note    Flowsheet Row Responses   [facility] patient discharged from? [facility]   Does the patient have one of the following disease processes/diagnoses(primary or secondary)? COPD   TCM attempt successful? Yes   Call start time 1730   Call end time 1736   Discharge diagnosis Acute asthma/COPD exacerbation, Acute hypoxic respiratory failure, Acute parainfluenza virus   Person spoke with today (if not patient) and relationship patient   Meds reviewed with patient/caregiver? Yes   Is the patient having any side effects they believe may be caused by any medication additions or changes? No   Does the patient have all medications ordered at discharge? Yes   Is the patient taking all medications as directed (includes completed medication regime)? Yes   Comments Has a hospital followup scheduled on 4/13/2023 with dr Rawls.    Does the patient have an appointment with their PCP within 7 days of discharge? Yes   Psychosocial issues? No   Did the patient receive a copy of their discharge instructions? Yes   Nursing interventions Reviewed instructions with patient   What is the patient's perception of their health status since discharge? Improving   Nursing Interventions Nurse provided patient education   If the patient is a current smoker, are they able to teach back resources for cessation? Not a smoker   Is the patient/caregiver able to teach back the hierarchy of who to call/visit for symptoms/problems? PCP, Specialist, Home health nurse, Urgent Care, ED, 911 Yes   Is the patient able to teach back COPD zones? Yes   Nursing interventions Education provided on various zones   Patient reports what zone on this call? Yellow Zone   Yellow Zone Increased or thicker phlegm/mucus, Using quick relief inhaler/nebulizer more often  [Was really congested and wheezing this am but after neb was able to get some phelgm up, she is doing better. ]   Yellow interventions Get plenty of rest, Start an oral corticosteroid  if ordered, Continue taking daily medications   TCM call completed? Yes   Call end time [id number]   Would this patient benefit from a Referral to [facility] Social Work? No   Is the patient interested in additional calls from an ambulatory ?  NOTE:  applies to high risk patients requiring additional follow-up. No          Lopez Rose RN    4/11/2023, 17:36 EDT

## 2023-04-11 NOTE — TELEPHONE ENCOUNTER
Caller: Raquel Mariee    Relationship to patient: Self    Best call back number: [phone]    Chief complaint: INFLUENZA AND ASTHMA   Type of visit: HOSPITAL FOLLOW UP     Requested date: 04/12/2023 - 04/14/2023 WITH DR STANLEY     If rescheduling, when is the original appointment: 04/13/2023 WITH DR COBB    Additional notes: THE PATIENT REPORTS SHE WAS PREVIOUSLY ADVISED BY DR STANLEY TO REQUEST TO BE WORKED IN BY HIM WHEN THERE IS NO AVAILABILITY. PLEASE CALL THE PATIENT AND ADVISE

## 2023-04-11 NOTE — OUTREACH NOTE
Prep Survey    Flowsheet Row Responses   [facility] facility patient discharged from? [place]   Is LACE score < 7 ? No   Eligibility [facility]   Date of Admission 04/08/23   Date of Discharge 04/10/23   Discharge Disposition Home or Self Care   Discharge diagnosis Acute asthma/COPD exacerbation, Acute hypoxic respiratory failure, Acute parainfluenza virus   Does the patient have one of the following disease processes/diagnoses(primary or secondary)? COPD   Does the patient have Home health ordered? No   Is there a DME ordered? No   Prep survey completed? Yes          Sherri CASTANEDA - Registered Nurse

## 2023-04-12 ENCOUNTER — TELEPHONE (OUTPATIENT)
Dept: UROLOGY | Facility: CLINIC | Age: 68
End: 2023-04-12
Payer: MEDICARE

## 2023-04-13 ENCOUNTER — OFFICE VISIT (OUTPATIENT)
Dept: INTERNAL MEDICINE | Facility: CLINIC | Age: 68
End: 2023-04-13
Payer: MEDICARE

## 2023-04-13 VITALS
SYSTOLIC BLOOD PRESSURE: 118 MMHG | HEIGHT: 64 IN | RESPIRATION RATE: 16 BRPM | BODY MASS INDEX: 35.17 KG/M2 | WEIGHT: 206 LBS | TEMPERATURE: 96.9 F | OXYGEN SATURATION: 96 % | DIASTOLIC BLOOD PRESSURE: 78 MMHG | HEART RATE: 94 BPM

## 2023-04-13 DIAGNOSIS — J45.901 MODERATE ASTHMA WITH EXACERBATION, UNSPECIFIED WHETHER PERSISTENT: Primary | ICD-10-CM

## 2023-04-13 PROCEDURE — 3074F SYST BP LT 130 MM HG: CPT | Performed by: INTERNAL MEDICINE

## 2023-04-13 PROCEDURE — 3078F DIAST BP <80 MM HG: CPT | Performed by: INTERNAL MEDICINE

## 2023-04-13 PROCEDURE — 99214 OFFICE O/P EST MOD 30 MIN: CPT | Performed by: INTERNAL MEDICINE

## 2023-04-13 RX ORDER — GUAIFENESIN AND CODEINE PHOSPHATE 100; 10 MG/5ML; MG/5ML
5 SOLUTION ORAL 3 TIMES DAILY PRN
Qty: 118 ML | Refills: 0 | Status: SHIPPED | OUTPATIENT
Start: 2023-04-13

## 2023-04-13 RX ORDER — FLUCONAZOLE 100 MG/1
100 TABLET ORAL DAILY
Qty: 1 TABLET | Refills: 0 | Status: SHIPPED | OUTPATIENT
Start: 2023-04-13 | End: 2023-04-17 | Stop reason: SDUPTHER

## 2023-04-13 RX ORDER — PREDNISONE 10 MG/1
10 TABLET ORAL DAILY
Qty: 10 TABLET | Refills: 0 | Status: SHIPPED | OUTPATIENT
Start: 2023-04-13

## 2023-04-13 NOTE — PROGRESS NOTES
Subjective     Patient ID: Raquel Mariee is a 67 y.o. female. Patient is here for management of multiple medical problems.     Chief Complaint   Patient presents with   • Hospital Follow Up Visit     COPD     History of Present Illness       Parainfluenza virus.    Hospitalized.  Got out on 4/10            The following portions of the patient's history were reviewed and updated as appropriate: allergies, current medications, past family history, past medical history, past social history, past surgical history and problem list.    Review of Systems    Current Outpatient Medications:   •  acetaminophen-codeine (TYLENOL #3) 300-30 MG per tablet, Take 1 tablet by mouth Every 4 (Four) Hours As Needed for Moderate Pain., Disp: 10 tablet, Rfl: 0  •  albuterol sulfate  (90 Base) MCG/ACT inhaler, Inhale 2 puffs by mouth every 4 to 6 hours as needed for cough, wheeze, shortness of breath. Use with vortex spacer, Disp: 8.5 g, Rfl: 3  •  allopurinol (Zyloprim) 100 MG tablet, Take 1 tablet by mouth Daily., Disp: 90 tablet, Rfl: 3  •  betamethasone valerate (VALISONE) 0.1 % cream, Apply topically to the appropriate area as directed Daily., Disp: 45 g, Rfl: 11  •  Budeson-Glycopyrrol-Formoterol (Breztri Aerosphere) 160-9-4.8 MCG/ACT aerosol inhaler, Inhale 2 puffs by mouth twice daily. Rinse mouth after use, Disp: 10.7 g, Rfl: 11  •  buPROPion SR (WELLBUTRIN SR) 150 MG 12 hr tablet, Take 1 tablet by mouth 2 (Two) Times a Day., Disp: 180 tablet, Rfl: 3  •  calcium carbonate (Antacid Maximum) 1000 MG chewable tablet, Chew 500 mg 3 (Three) Times a Day., Disp: , Rfl:   •  cholecalciferol (VITAMIN D3) 25 MCG (1000 UT) tablet, Take 1 tablet by mouth Daily., Disp: , Rfl:   •  clonazePAM (KlonoPIN) 0.5 MG tablet, Take 1 tablet by mouth At Night As Needed, Disp: 30 tablet, Rfl: 2  •  dicyclomine (BENTYL) 10 MG capsule, Take 1 capsule by mouth 3 (Three) Times a Day., Disp: 270 capsule, Rfl: 3  •  diphenhydrAMINE (Benadryl  Allergy) 25 MG tablet, Take 1-2 tablets by mouth Every 4-6 Hours As Needed., Disp: 90 tablet, Rfl: 11  •  estradiol (ESTRACE) 0.1 MG/GM vaginal cream, Apply externally as directed to urethra twice weekly at bedtime., Disp: 42.5 g, Rfl: 3  •  ferrous sulfate 325 (65 FE) MG EC tablet, Take 1 tablet by mouth., Disp: , Rfl:   •  fexofenadine (ALLEGRA) 180 MG tablet, Take 1 tablet by mouth Daily., Disp: , Rfl:   •  fluticasone (FLONASE) 50 MCG/ACT nasal spray, Instill 2 sprays into each nostril daily. (Patient taking differently: 1 spray by Each Nare route Daily.), Disp: 16 g, Rfl: 11  •  furosemide (LASIX) 40 MG tablet, Take 1 tablet by mouth Daily. Two days a month., Disp: 30 tablet, Rfl: 1  •  glucosamine-chondroitin 500-400 MG capsule capsule, Take 1 capsule by mouth 2 (Two) Times a Day With Meals., Disp: , Rfl:   •  guaiFENesin (MUCINEX) 600 MG 12 hr tablet, Take  by mouth 2 (Two) Times a Day., Disp: , Rfl:   •  Hypertonic Nasal Wash (Sinus Rinse Kit) pack, use once or twice daily as needed, Disp: 1 each, Rfl: 3  •  immune globulin, human, (Gammagard) 20 GM/200ML solution infusion, Infuse 40 g into a venous catheter Every 28 (Twenty-Eight) Days., Disp: 400 mL, Rfl: 0  •  ipratropium (ATROVENT) 0.06 % nasal spray, Instill 1-2 sprays each nostril 2-3 times daily as needed, Disp: 15 mL, Rfl: 3  •  ipratropium-albuterol (DUO-NEB) 0.5-2.5 mg/3 ml nebulizer, Inhale the contents of 1 vial into nebulizer every 4-6 hours as needed for cough,wheezing and shortness of breath., Disp: 90 mL, Rfl: 3  •  isosorbide mononitrate (IMDUR) 30 MG 24 hr tablet, Take 1 tablet by mouth Every Morning., Disp: 90 tablet, Rfl: 3  •  levothyroxine (SYNTHROID, LEVOTHROID) 50 MCG tablet, Take 1 tablet by mouth Daily., Disp: 90 tablet, Rfl: 3  •  Magnesium 250 MG tablet, Take 2 tablets by mouth 2 (Two) Times a Day., Disp: , Rfl:   •  montelukast (SINGULAIR) 10 MG tablet, Take 1 tablet by mouth every night at bedtime., Disp: 30 tablet, Rfl: 11  •   multivitamin with minerals tablet tablet, Take 1 tablet by mouth Daily., Disp: , Rfl:   •  omeprazole (priLOSEC) 40 MG capsule, Take 1 capsule by mouth 2 (Two) Times a Day., Disp: 180 capsule, Rfl: 3  •  ondansetron (ZOFRAN) 4 MG tablet, Take 1 tablet by mouth Every 8 (Eight) Hours As Needed for Nausea or Vomiting., Disp: 30 tablet, Rfl: 11  •  POTASSIUM CHLORIDE PO, Take 20 mEq by mouth See Admin Instructions. Takes two times a month with Lasix, Disp: , Rfl:   •  potassium citrate (UROCIT-K) 10 MEQ (1080 MG) CR tablet, Take 1 tablet by mouth Daily., Disp: 90 tablet, Rfl: 3  •  predniSONE (DELTASONE) 20 MG tablet, Take 2 tablets by mouth Daily for 5 days., Disp: 10 tablet, Rfl: 0  •  Semaglutide (Rybelsus) 7 MG tablet, Take 1 tablet by mouth Daily., Disp: 90 tablet, Rfl: 3  •  Spacer/Aero-Holding Chambers (AeroChamber Plus Wali-Vu) misc, Use as directed with albuterol inhaler, Disp: 1 each, Rfl: 0  •  Spacer/Aero-Holding Chambers (AeroChamber Plus Wali-Vu) misc, Use as directed with inhaler, Disp: 1 each, Rfl: 0  •  tiZANidine (ZANAFLEX) 4 MG tablet, Take 1 tablet by mouth Every Night., Disp: 90 tablet, Rfl: 1  •  ubrogepant (Ubrelvy) 100 MG tablet, Take 1 tablet by mouth 1 (One) Time As Needed (migraine) for up to 1 dose., Disp: 4 tablet, Rfl: 0  •  valsartan (DIOVAN) 160 MG tablet, Take 1 tablet by mouth Daily., Disp: 90 tablet, Rfl: 3  •  vitamin B-12 (CYANOCOBALAMIN) 1000 MCG tablet, Take 1 tablet by mouth Daily., Disp: , Rfl:   •  guaiFENesin-codeine (GUAIFENESIN AC) 100-10 MG/5ML liquid, Take 5 mL by mouth 3 (Three) Times a Day As Needed for Cough., Disp: 118 mL, Rfl: 0  •  predniSONE (DELTASONE) 10 MG tablet, Take 1 tablet by mouth Daily. 20mg for3 days then 10mg for3 days then 5 mg x 2 days., Disp: 10 tablet, Rfl: 0    Current Facility-Administered Medications:   •  heparin injection 500 Units, 5 mL, Intravenous, PRN, Sanjeev Rawls MD  •  sodium chloride 0.9 % flush 10 mL, 10 mL, Intravenous, Q12H, [facility],  "Sanjeev DELAROSA MD  •  sodium chloride 0.9 % flush 10 mL, 10 mL, Intravenous, PRN, Sanjeev Rawls MD  •  sodium chloride 0.9 % flush 20 mL, 20 mL, Intravenous, PRN, Sanjeev Rawls MD    Objective      Blood pressure 118/78, pulse 94, temperature 96.9 °F (36.1 °C), resp. rate 16, height 162.6 cm (64\"), weight 93.4 kg (206 lb), SpO2 96 %, not currently breastfeeding.    Physical Exam     General Appearance:    Alert, cooperative, no distress, appears stated age   Head:    Normocephalic, without obvious abnormality, atraumatic   Eyes:    PERRL, conjunctiva/corneas clear, EOM's intact   Ears:    Normal TM's and external ear canals, both ears   Nose:   Nares normal, septum midline, mucosa normal, no drainage   or sinus tenderness   Throat:   Lips, mucosa, and tongue normal; teeth and gums normal   Neck:   Supple, symmetrical, trachea midline, no adenopathy;        thyroid:  No enlargement/tenderness/nodules; no carotid    bruit or JVD   Back:     Symmetric, no curvature, ROM normal, no CVA tenderness   Lungs:     wheezing to auscultation bilaterally, respirations unlabored   Chest wall:    No tenderness or deformity   Heart:    Regular rate and rhythm, S1 and S2 normal, no murmur,        rub or gallop   Abdomen:     Soft, non-tender, bowel sounds active all four quadrants,     no masses, no organomegaly   Extremities:   Extremities normal, atraumatic, no cyanosis or edema   Pulses:   2+ and symmetric all extremities   Skin:   Skin color, texture, turgor normal, no rashes or lesions   Lymph nodes:   Cervical, supraclavicular, and axillary nodes normal   Neurologic:   CNII-XII intact. Normal strength, sensation and reflexes       throughout      Results for orders placed or performed during the hospital encounter of 04/08/23   COVID-19 and FLU A/B PCR - Swab, Nasopharynx    Specimen: Nasopharynx; Swab   Result Value Ref Range    COVID19 Not Detected Not Detected - Ref. Range    Influenza A PCR Not Detected Not Detected    " Influenza B PCR Not Detected Not Detected   Respiratory Panel PCR w/COVID-19(SARS-CoV-2) [facility] In-House, NP Swab in UTM/VTM, 3-4 HR TAT - Swab, Nasopharynx    Specimen: Nasopharynx; Swab   Result Value Ref Range    ADENOVIRUS, PCR Not Detected Not Detected    Coronavirus 229E Not Detected Not Detected    Coronavirus HKU1 Not Detected Not Detected    Coronavirus NL63 Not Detected Not Detected    Coronavirus OC43 Not Detected Not Detected    COVID19 Not Detected Not Detected - Ref. Range    Human Metapneumovirus Not Detected Not Detected    Human Rhinovirus/Enterovirus Not Detected Not Detected    Influenza A PCR Not Detected Not Detected    Influenza B PCR Not Detected Not Detected    Parainfluenza Virus 1 Not Detected Not Detected    Parainfluenza Virus 2 Not Detected Not Detected    Parainfluenza Virus 3 Detected (A) Not Detected    Parainfluenza Virus 4 Not Detected Not Detected    RSV, PCR Not Detected Not Detected    Bordetella pertussis pcr Not Detected Not Detected    Bordetella parapertussis PCR Not Detected Not Detected    Chlamydophila pneumoniae PCR Not Detected Not Detected    Mycoplasma pneumo by PCR Not Detected Not Detected   Comprehensive Metabolic Panel    Specimen: Blood   Result Value Ref Range    Glucose 95 65 - 99 mg/dL    BUN 13 8 - 23 mg/dL    Creatinine 1.08 (H) 0.57 - 1.00 mg/dL    Sodium 136 136 - 145 mmol/L    Potassium 3.8 3.5 - 5.2 mmol/L    Chloride 101 98 - 107 mmol/L    CO2 26.1 22.0 - 29.0 mmol/L    Calcium 8.7 8.6 - 10.5 mg/dL    Total Protein 6.8 6.0 - 8.5 g/dL    Albumin 4.0 3.5 - 5.2 g/dL    ALT (SGPT) 21 1 - 33 U/L    AST (SGOT) 28 1 - 32 U/L    Alkaline Phosphatase 61 39 - 117 U/L    Total Bilirubin 0.3 0.0 - 1.2 mg/dL    Globulin 2.8 gm/dL    A/G Ratio 1.4 g/dL    BUN/Creatinine Ratio 12.0 7.0 - 25.0    Anion Gap 8.9 5.0 - 15.0 mmol/L    eGFR 56.4 (L) >60.0 mL/min/1.73   BNP    Specimen: Blood   Result Value Ref Range    proBNP 688.5 0.0 - 900.0 pg/mL    Single High Sensitivity Troponin T    Specimen: Blood   Result Value Ref Range    HS Troponin T 19 (H) <10 ng/L   CBC Auto Differential    Specimen: Blood   Result Value Ref Range    WBC 5.97 3.40 - 10.80 10*3/mm3    RBC 3.72 (L) 3.77 - 5.28 10*6/mm3    Hemoglobin 12.1 12.0 - 15.9 g/dL    Hematocrit 34.5 34.0 - 46.6 %    MCV 92.7 79.0 - 97.0 fL    MCH 32.5 26.6 - 33.0 pg    MCHC 35.1 31.5 - 35.7 g/dL    RDW 12.8 12.3 - 15.4 %    RDW-SD 44.0 37.0 - 54.0 fl    MPV 9.0 6.0 - 12.0 fL    Platelets 191 140 - 450 10*3/mm3    Neutrophil % 67.9 42.7 - 76.0 %    Lymphocyte % 21.4 19.6 - 45.3 %    Monocyte % 9.4 5.0 - 12.0 %    Eosinophil % 0.2 (L) 0.3 - 6.2 %    Basophil % 0.8 0.0 - 1.5 %    Immature Grans % 0.3 0.0 - 0.5 %    Neutrophils, Absolute 4.05 1.70 - 7.00 10*3/mm3    Lymphocytes, Absolute 1.28 0.70 - 3.10 10*3/mm3    Monocytes, Absolute 0.56 0.10 - 0.90 10*3/mm3    Eosinophils, Absolute 0.01 0.00 - 0.40 10*3/mm3    Basophils, Absolute 0.05 0.00 - 0.20 10*3/mm3    Immature Grans, Absolute 0.02 0.00 - 0.05 10*3/mm3    nRBC 0.0 0.0 - 0.2 /100 WBC   High Sensitivity Troponin T    Specimen: Blood   Result Value Ref Range    HS Troponin T 19 (H) <10 ng/L   High Sensitivity Troponin T 2Hr    Specimen: Blood   Result Value Ref Range    HS Troponin T 17 (H) <10 ng/L    Troponin T Delta -2 >=-4 - <+4 ng/L   Basic Metabolic Panel    Specimen: Blood   Result Value Ref Range    Glucose 162 (H) 65 - 99 mg/dL    BUN 12 8 - 23 mg/dL    Creatinine 0.94 0.57 - 1.00 mg/dL    Sodium 134 (L) 136 - 145 mmol/L    Potassium 4.6 3.5 - 5.2 mmol/L    Chloride 100 98 - 107 mmol/L    CO2 24.6 22.0 - 29.0 mmol/L    Calcium 8.7 8.6 - 10.5 mg/dL    BUN/Creatinine Ratio 12.8 7.0 - 25.0    Anion Gap 9.4 5.0 - 15.0 mmol/L    eGFR 66.6 >60.0 mL/min/1.73   CBC Auto Differential    Specimen: Blood   Result Value Ref Range    WBC 4.94 3.40 - 10.80 10*3/mm3    RBC 3.63 (L) 3.77 - 5.28 10*6/mm3    Hemoglobin 11.7 (L) 12.0 - 15.9 g/dL    Hematocrit 34.5  34.0 - 46.6 %    MCV 95.0 79.0 - 97.0 fL    MCH 32.2 26.6 - 33.0 pg    MCHC 33.9 31.5 - 35.7 g/dL    RDW 12.8 12.3 - 15.4 %    RDW-SD 44.7 37.0 - 54.0 fl    MPV 9.1 6.0 - 12.0 fL    Platelets 185 140 - 450 10*3/mm3    Neutrophil % 88.2 (H) 42.7 - 76.0 %    Lymphocyte % 6.7 (L) 19.6 - 45.3 %    Monocyte % 4.3 (L) 5.0 - 12.0 %    Eosinophil % 0.2 (L) 0.3 - 6.2 %    Basophil % 0.2 0.0 - 1.5 %    Immature Grans % 0.4 0.0 - 0.5 %    Neutrophils, Absolute 4.36 1.70 - 7.00 10*3/mm3    Lymphocytes, Absolute 0.33 (L) 0.70 - 3.10 10*3/mm3    Monocytes, Absolute 0.21 0.10 - 0.90 10*3/mm3    Eosinophils, Absolute 0.01 0.00 - 0.40 10*3/mm3    Basophils, Absolute 0.01 0.00 - 0.20 10*3/mm3    Immature Grans, Absolute 0.02 0.00 - 0.05 10*3/mm3    nRBC 0.0 0.0 - 0.2 /100 WBC   Basic Metabolic Panel    Specimen: Blood   Result Value Ref Range    Glucose 132 (H) 65 - 99 mg/dL    BUN 15 8 - 23 mg/dL    Creatinine 0.95 0.57 - 1.00 mg/dL    Sodium 134 (L) 136 - 145 mmol/L    Potassium 4.8 3.5 - 5.2 mmol/L    Chloride 101 98 - 107 mmol/L    CO2 22.4 22.0 - 29.0 mmol/L    Calcium 8.9 8.6 - 10.5 mg/dL    BUN/Creatinine Ratio 15.8 7.0 - 25.0    Anion Gap 10.6 5.0 - 15.0 mmol/L    eGFR 65.8 >60.0 mL/min/1.73   CBC Auto Differential    Specimen: Blood   Result Value Ref Range    WBC 6.51 3.40 - 10.80 10*3/mm3    RBC 3.81 3.77 - 5.28 10*6/mm3    Hemoglobin 12.2 12.0 - 15.9 g/dL    Hematocrit 35.6 34.0 - 46.6 %    MCV 93.4 79.0 - 97.0 fL    MCH 32.0 26.6 - 33.0 pg    MCHC 34.3 31.5 - 35.7 g/dL    RDW 12.7 12.3 - 15.4 %    RDW-SD 43.6 37.0 - 54.0 fl    MPV 9.2 6.0 - 12.0 fL    Platelets 200 140 - 450 10*3/mm3    Neutrophil % 80.5 (H) 42.7 - 76.0 %    Lymphocyte % 12.3 (L) 19.6 - 45.3 %    Monocyte % 6.1 5.0 - 12.0 %    Eosinophil % 0.2 (L) 0.3 - 6.2 %    Basophil % 0.3 0.0 - 1.5 %    Immature Grans % 0.6 (H) 0.0 - 0.5 %    Neutrophils, Absolute 5.24 1.70 - 7.00 10*3/mm3    Lymphocytes, Absolute 0.80 0.70 - 3.10 10*3/mm3    Monocytes, Absolute  0.40 0.10 - 0.90 10*3/mm3    Eosinophils, Absolute 0.01 0.00 - 0.40 10*3/mm3    Basophils, Absolute 0.02 0.00 - 0.20 10*3/mm3    Immature Grans, Absolute 0.04 0.00 - 0.05 10*3/mm3    nRBC 0.0 0.0 - 0.2 /100 WBC   POC Glucose Once    Specimen: Blood   Result Value Ref Range    Glucose 163 (H) 70 - 130 mg/dL   POC Glucose Once    Specimen: Blood   Result Value Ref Range    Glucose 117 70 - 130 mg/dL   POC Glucose Once    Specimen: Blood   Result Value Ref Range    Glucose 152 (H) 70 - 130 mg/dL   POC Glucose Once    Specimen: Blood   Result Value Ref Range    Glucose 102 70 - 130 mg/dL   POC Glucose Once    Specimen: Blood   Result Value Ref Range    Glucose 154 (H) 70 - 130 mg/dL   Green Top (Gel)   Result Value Ref Range    Extra Tube Hold for add-ons.    Lavender Top   Result Value Ref Range    Extra Tube hold for add-on    Gold Top - SST   Result Value Ref Range    Extra Tube Hold for add-ons.    Light Blue Top   Result Value Ref Range    Extra Tube Hold for add-ons.          Assessment & Plan   Pt weaning pred on 40mg.      Diagnoses and all orders for this visit:    1. Moderate asthma with exacerbation, unspecified whether persistent (Primary)  -     guaiFENesin-codeine (GUAIFENESIN AC) 100-10 MG/5ML liquid; Take 5 mL by mouth 3 (Three) Times a Day As Needed for Cough.  Dispense: 118 mL; Refill: 0    Other orders  -     predniSONE (DELTASONE) 10 MG tablet; Take 1 tablet by mouth Daily. 20mg for3 days then 10mg for3 days then 5 mg x 2 days.  Dispense: 10 tablet; Refill: 0      Return in about 1 week (around 4/20/2023).          There are no Patient Instructions on file for this visit.     Sanjeev Rawls MD    Assessment & Plan

## 2023-04-17 RX ORDER — FLUCONAZOLE 100 MG/1
100 TABLET ORAL
Qty: 3 TABLET | Refills: 0 | Status: SHIPPED | OUTPATIENT
Start: 2023-04-17

## 2023-04-17 RX ORDER — FLUCONAZOLE 100 MG/1
100 TABLET ORAL DAILY
Qty: 1 TABLET | Refills: 0 | Status: SHIPPED | OUTPATIENT
Start: 2023-04-17

## 2023-04-18 ENCOUNTER — DISEASE STATE MANAGEMENT VISIT (OUTPATIENT)
Dept: PHARMACY | Facility: HOSPITAL | Age: 68
End: 2023-04-18
Payer: MEDICARE

## 2023-04-18 VITALS
WEIGHT: 207 LBS | SYSTOLIC BLOOD PRESSURE: 129 MMHG | BODY MASS INDEX: 35.34 KG/M2 | HEIGHT: 64 IN | HEART RATE: 79 BPM | DIASTOLIC BLOOD PRESSURE: 66 MMHG | OXYGEN SATURATION: 96 %

## 2023-04-18 DIAGNOSIS — J45.50 SEVERE PERSISTENT ASTHMA WITHOUT COMPLICATION: Primary | ICD-10-CM

## 2023-04-18 PROCEDURE — G0463 HOSPITAL OUTPT CLINIC VISIT: HCPCS | Performed by: NURSE PRACTITIONER

## 2023-04-18 RX ORDER — PREDNISONE 20 MG/1
TABLET ORAL
Qty: 10 TABLET | Refills: 0 | Status: SHIPPED | OUTPATIENT
Start: 2023-04-18 | End: 2023-04-26 | Stop reason: SDUPTHER

## 2023-04-18 NOTE — PROGRESS NOTES
New Pulmonary Patient Office Visit      Patient Name: Raquel Mariee    Referring Physician: Giovani Alvarez DO    Chief Complaint: Hospital follow-up      History of Present Illness: Raquel Mariee is a 67 y.o. female who is here today to establish care with Pulmonary for asthma following recent hospitalization at [facility] for management of acute hypoxic respiratory failure secondary to acute parainfluenza virus, acute asthma/COPD exacerbation. The patient history includes eosinophilic asthma, aortic valve stenosis, CKD, diabetes diet-controlled, fibromyalgia, hypertension, hypothyroidism, IBS, migraines, osteoarthritis, PTSD, history of Pseudomonas infection, sleep apnea, TIA, trochanteric bursitis, history of tracheal reconstruction surgery.    The patient states she has not completed her course of prednisone yet from hospital d/c. She is on IVIG for immune deficiency. She notes she takes 40 mg of prednisone on the day of her infusion.     The patient states she has had asthma for life. She notes it reactivated again when she was about 30 years old. The patient states from age 5 to 30 she had bronchitis. She notes she was following with pulmonary in [place]. The patient states she was diagnosed with eosinophilic asthma. She notes she took Nucala for 1 year but the medication became too expensive. She notes her eosinophils have since been normal. The patient states prior to this hospitalization she had not been on prednisone routinely other than when she takes her infusions. She notes she may have taken prednisone for a couple of days in the summer of 2020. The patient states she has been on infusions for 30 years. She notes she was diagnosed with tracheomalacia in 2008. She notes she had fractured every single cartilage ring. The patient states the cardiothoracic surgeon performed an 8-hour surgery. She notes she had mesh placed around her trachea.    She notes she coughs mostly at night. The patient  states she is waking up congested. She notes she coughs up phlegm. The patient states it is a lot lighter than it was when she was in the hospital. She notes she needs equipment for her nebulizer. The patient states she is not on oxygen at home.     The patient states she has a strong family history of asthma. She notes her father had asthma. The patient states she has not been tested for alpha-1 antitrypsin deficiency. She notes she has been on Breztri for approximately 6 months. The patient states she was previously on Advair and Spiriva. She notes the Breztri works better for her. She notes Dr. Garza checks her CBC every 3 months due to her immune deficiency.    The patient states she is following with cardiology. She notes she has a loud murmur. The patient states she was told she would eventually have to have her aortic valve replaced. She notes she does not think she has an appointment until 08/2023 or 09/2023.    Subjective      Review of Systems:   Review of Systems   Constitutional: Positive for fatigue. Negative for fever and unexpected weight change.   Respiratory: Positive for cough and shortness of breath. Negative for wheezing.    Cardiovascular: Negative for chest pain and leg swelling.        Past Medical History:   Past Medical History:   Diagnosis Date   • Anxiety    • Aortic valve stenosis    • Cardiac murmur    • Chronic kidney disease    • Clostridium difficile infection     in her 30s   • COPD (chronic obstructive pulmonary disease)    • Depression    • Diabetes mellitus     type II   • Eosinophilic asthma    • Fibromyalgia, primary    • Hypertension    • Hypogammaglobulinemia    • Hypothyroidism    • Irritable bowel syndrome    • Migraines    • Morbid obesity    • Osteoarthritis    • Prinzmetal angina 1990   • Pseudomonas infection    • PTSD (post-traumatic stress disorder)    • Renal insufficiency    • Sinus tachycardia 1990   • Sleep apnea     no longer uses/needs cpap   • Tachycardia    •  TIA (transient ischemic attack) 2017   • Trochanteric bursitis 01/25/2023       Past Surgical History:   Past Surgical History:   Procedure Laterality Date   • APPENDECTOMY     • BUNIONECTOMY Left    • CARDIAC CATHETERIZATION  2017    no stents needed   • CARPAL TUNNEL RELEASE Right    • GASTRIC BYPASS     • HAND SURGERY Left    • INTERSTIM PLACEMENT N/A 5/3/2023    Procedure: INTERSTIM STAGES 1 AND 2 LEAD AND GENERATOR PLACEMENT;  Surgeon: Abner Nation MD;  Location: [facility];  Service: Urology;  Laterality: N/A;   • REPLACEMENT TOTAL KNEE BILATERAL     • TEETH EXTRACTION      all of bottom teeth removed   • TONSILLECTOMY     • TOTAL ABDOMINAL HYSTERECTOMY WITH SALPINGO OOPHORECTOMY         Family History:   Family History   Problem Relation Age of Onset   • Diabetes Mother    • Stroke Mother    • Heart disease Mother    • Hypertension Mother    • Endometriosis Mother    • Diabetes Father    • Hypertension Father    • Stroke Father    • Heart attack Father    • Asthma Father    • COPD Father    • Dementia Father    • COPD Sister    • Asthma Sister    • Cancer Sister    • Brain cancer Sister    • Diabetes Sister    • Stroke Sister    • Heart attack Sister    • Endometriosis Sister    • Depression Sister    • Kidney disease Sister    • Diabetes Sister    • COPD Sister    • Asthma Sister    • Endometriosis Sister    • Depression Sister    • Heart attack Sister    • Endometriosis Sister    • COPD Brother    • Asthma Brother    • Diabetes Brother    • Asthma Brother    • COPD Brother    • Diabetes Brother    • Hypertension Brother    • Asthma Daughter    • Endometriosis Daughter    • Osteoarthritis Daughter    • Hypertension Daughter    • Kidney failure Daughter    • Irritable bowel syndrome Daughter    • Anxiety disorder Daughter    • Bipolar disorder Daughter    • Depression Daughter    • Self-Injurious Behavior  Daughter    • Suicide Attempts Daughter    • Asthma Daughter    • Endometriosis Daughter    •  Osteoarthritis Daughter    • Asthma Son    • ADD / ADHD Son    • Alcohol abuse Son    • Drug abuse Son    • Breast cancer Maternal Cousin    • OCD Neg Hx    • Paranoid behavior Neg Hx    • Schizophrenia Neg Hx    • Seizures Neg Hx    • Ovarian cancer Neg Hx        Social History:   Social History     Socioeconomic History   • Marital status:    Tobacco Use   • Smoking status: Never   • Smokeless tobacco: Never   Vaping Use   • Vaping Use: Never used   Substance and Sexual Activity   • Alcohol use: Not Currently     Comment: ONCE A YEAR   • Drug use: Never   • Sexual activity: Defer       Medications:     Current Outpatient Medications:   •  acetaminophen-codeine (TYLENOL #3) 300-30 MG per tablet, Take 1 tablet by mouth Every 4 (Four) Hours As Needed for Moderate Pain., Disp: 10 tablet, Rfl: 0  •  albuterol sulfate  (90 Base) MCG/ACT inhaler, Inhale 2 puffs by mouth every 4 to 6 hours as needed for cough, wheeze, shortness of breath. Use with vortex spacer, Disp: 8.5 g, Rfl: 3  •  allopurinol (Zyloprim) 100 MG tablet, Take 1 tablet by mouth Daily., Disp: 90 tablet, Rfl: 3  •  betamethasone valerate (VALISONE) 0.1 % cream, Apply topically to the appropriate area as directed Daily., Disp: 45 g, Rfl: 11  •  Budeson-Glycopyrrol-Formoterol (Breztri Aerosphere) 160-9-4.8 MCG/ACT aerosol inhaler, Inhale 2 puffs by mouth twice daily. Rinse mouth after use, Disp: 10.7 g, Rfl: 11  •  buPROPion SR (WELLBUTRIN SR) 150 MG 12 hr tablet, Take 1 tablet by mouth 2 (Two) Times a Day., Disp: 180 tablet, Rfl: 3  •  calcium carbonate (Antacid Maximum) 1000 MG chewable tablet, Chew 500 mg 3 (Three) Times a Day., Disp: , Rfl:   •  cholecalciferol (VITAMIN D3) 25 MCG (1000 UT) tablet, Take 1 tablet by mouth Daily., Disp: , Rfl:   •  clonazePAM (KlonoPIN) 0.5 MG tablet, Take 1 tablet by mouth At Night As Needed, Disp: 30 tablet, Rfl: 2  •  dicyclomine (BENTYL) 10 MG capsule, Take 1 capsule by mouth 3 (Three) Times a Day., Disp:  270 capsule, Rfl: 3  •  diphenhydrAMINE (Benadryl Allergy) 25 MG tablet, Take 1-2 tablets by mouth Every 4-6 Hours As Needed., Disp: 90 tablet, Rfl: 11  •  estradiol (ESTRACE) 0.1 MG/GM vaginal cream, Apply externally as directed to urethra twice weekly at bedtime., Disp: 42.5 g, Rfl: 3  •  ferrous sulfate 325 (65 FE) MG EC tablet, Take 1 tablet by mouth., Disp: , Rfl:   •  fexofenadine (ALLEGRA) 180 MG tablet, Take 1 tablet by mouth Daily., Disp: , Rfl:   •  fluconazole (Diflucan) 100 MG tablet, Take 1 tablet by mouth Every Other Day., Disp: 3 tablet, Rfl: 0  •  fluconazole (Diflucan) 100 MG tablet, Take 1 tablet by mouth Daily., Disp: 1 tablet, Rfl: 0  •  fluticasone (FLONASE) 50 MCG/ACT nasal spray, Instill 2 sprays into each nostril daily. (Patient taking differently: 1 spray by Each Nare route Daily.), Disp: 16 g, Rfl: 11  •  furosemide (LASIX) 40 MG tablet, Take 1 tablet by mouth Daily. Two days a month., Disp: 30 tablet, Rfl: 1  •  glucosamine-chondroitin 500-400 MG capsule capsule, Take 1 capsule by mouth 2 (Two) Times a Day With Meals., Disp: , Rfl:   •  guaiFENesin (MUCINEX) 600 MG 12 hr tablet, Take  by mouth 2 (Two) Times a Day., Disp: , Rfl:   •  guaiFENesin-codeine (GUAIFENESIN AC) 100-10 MG/5ML liquid, Take 1 teaspoonful (5mL) by mouth 3 (Three) Times a Day As Needed for Cough., Disp: 118 mL, Rfl: 0  •  Hypertonic Nasal Wash (Sinus Rinse Kit) pack, use once or twice daily as needed, Disp: 1 each, Rfl: 3  •  immune globulin, human, (Gammagard) 20 GM/200ML solution infusion, Infuse 40 g into a venous catheter Every 28 (Twenty-Eight) Days., Disp: 400 mL, Rfl: 0  •  ipratropium (ATROVENT) 0.06 % nasal spray, Instill 1-2 sprays each nostril 2-3 times daily as needed, Disp: 15 mL, Rfl: 3  •  ipratropium-albuterol (DUO-NEB) 0.5-2.5 mg/3 ml nebulizer, Inhale the contents of 1 vial into nebulizer every 4-6 hours as needed for cough,wheezing and shortness of breath., Disp: 90 mL, Rfl: 3  •  isosorbide  mononitrate (IMDUR) 30 MG 24 hr tablet, Take 1 tablet by mouth Every Morning., Disp: 90 tablet, Rfl: 3  •  levothyroxine (SYNTHROID, LEVOTHROID) 50 MCG tablet, Take 1 tablet by mouth Daily., Disp: 90 tablet, Rfl: 3  •  Magnesium 250 MG tablet, Take 2 tablets by mouth 2 (Two) Times a Day., Disp: , Rfl:   •  montelukast (SINGULAIR) 10 MG tablet, Take 1 tablet by mouth every night at bedtime., Disp: 30 tablet, Rfl: 11  •  multivitamin with minerals tablet tablet, Take 1 tablet by mouth Daily., Disp: , Rfl:   •  omeprazole (priLOSEC) 40 MG capsule, Take 1 capsule by mouth 2 (Two) Times a Day., Disp: 180 capsule, Rfl: 3  •  ondansetron (ZOFRAN) 4 MG tablet, Take 1 tablet by mouth Every 8 (Eight) Hours As Needed for Nausea or Vomiting., Disp: 30 tablet, Rfl: 11  •  POTASSIUM CHLORIDE PO, Take 20 mEq by mouth See Admin Instructions. Takes two times a month with Lasix, Disp: , Rfl:   •  potassium citrate (UROCIT-K) 10 MEQ (1080 MG) CR tablet, Take 1 tablet by mouth Daily., Disp: 90 tablet, Rfl: 3  •  predniSONE (DELTASONE) 10 MG tablet, Take 2 tablets daily for 3 days, then 1 tablet daily for 3 days, then 1/2 tablet daily for 2 days., Disp: 10 tablet, Rfl: 0  •  Semaglutide (Rybelsus) 7 MG tablet, Take 1 tablet by mouth Daily., Disp: 90 tablet, Rfl: 3  •  Spacer/Aero-Holding Chambers (AeroChamber Plus Wali-Vu) misc, Use as directed with albuterol inhaler, Disp: 1 each, Rfl: 0  •  Spacer/Aero-Holding Chambers (AeroChamber Plus Wali-Vu) misc, Use as directed with inhaler, Disp: 1 each, Rfl: 0  •  tiZANidine (ZANAFLEX) 4 MG tablet, Take 1 tablet by mouth Every Night., Disp: 90 tablet, Rfl: 1  •  ubrogepant (Ubrelvy) 100 MG tablet, Take 1 tablet by mouth 1 (One) Time As Needed (migraine) for up to 1 dose., Disp: 4 tablet, Rfl: 0  •  valsartan (DIOVAN) 160 MG tablet, Take 1 tablet by mouth Daily., Disp: 90 tablet, Rfl: 3  •  vitamin B-12 (CYANOCOBALAMIN) 1000 MCG tablet, Take 1 tablet by mouth Daily., Disp: , Rfl:     Current  Facility-Administered Medications:   •  heparin injection 500 Units, 5 mL, Intravenous, PRN, Sanjeev Rawls MD  •  sodium chloride 0.9 % flush 10 mL, 10 mL, Intravenous, Q12H, Sanjeev Rawls MD  •  sodium chloride 0.9 % flush 10 mL, 10 mL, Intravenous, PRN, Sanjeev Rawls MD  •  sodium chloride 0.9 % flush 20 mL, 20 mL, Intravenous, PRN, Sanjeev Rawls MD    Allergies:   Allergies   Allergen Reactions   • Cefaclor Hives and Swelling     Other reaction(s): SWELLING  Shed 4 layers of skin and extremely SOB  Shed 4 layers of skin and extremely SOB     • Cephalosporins Hives, Angioedema and Swelling     Rechallenge with cefipime caused rash on 1/14/08.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   • Verapamil Nausea And Vomiting     Dizzy   • Zolpidem Other (See Comments) and Unknown (See Comments)     Automatic behaviour in sleep.  Automatic behaviour in sleep.  confusion   • Cephalexin Rash   • Edecrin [Ethacrynic Acid] Rash and Other (See Comments)     Weight gain   • Hydrochlorothiazide Hives   • Hydrocodone-Acetaminophen GI Intolerance   • Sulfa Antibiotics Hives       Objective     Physical Exam:  Vital Signs:   Vitals:    04/18/23 1336   BP: 129/66   BP Location: Left arm   Patient Position: Sitting   Cuff Size: Adult   Pulse: 79   SpO2:  "96%   Weight: 93.9 kg (207 lb)   Height: 162.6 cm (64\")     Body mass index is 35.53 kg/m².    Physical Exam  Vitals reviewed.   Constitutional:       General: She is not in acute distress.     Appearance: She is obese. She is not toxic-appearing.   HENT:      Head: Normocephalic and atraumatic.      Mouth/Throat:      Mouth: Mucous membranes are moist.   Eyes:      Extraocular Movements: Extraocular movements intact.      Conjunctiva/sclera: Conjunctivae normal.      Pupils: Pupils are equal, round, and reactive to light.   Cardiovascular:      Rate and Rhythm: Normal rate.      Heart sounds: Murmur heard.   Pulmonary:      Effort: Pulmonary effort is normal.      Breath sounds: Normal breath sounds.   Abdominal:      General: There is no distension.      Palpations: Abdomen is soft.   Musculoskeletal:         General: No deformity.      Cervical back: Neck supple.   Skin:     General: Skin is warm and dry.      Findings: No rash.   Neurological:      General: No focal deficit present.      Mental Status: She is alert and oriented to person, place, and time.   Psychiatric:         Mood and Affect: Mood normal.         Behavior: Behavior normal.       Results Review:   June 2020 chest CT compared to December 2018 study showed parenchymal density in the right middle and lower lobe stable from prior, presumably scarring. Stable adjacent postoperative changes right chest wall. Ectasia of the ascending aorta similar to prior.    April 2023 CXR showed the heart is normal in size. Left subclavian port is stable in position. Left lung is clear. Postsurgical change again noted in the right hemithorax, stable from prior exam. No acute infiltrate is seen..  The mediastinum is unremarkable.     WBC   Date Value Ref Range Status   04/10/2023 6.51 3.40 - 10.80 10*3/mm3 Final   03/22/2022 4.75 3.40 - 10.80 10*3/mm3 Final   06/05/2020 4.3 4.2 - 11.0 K/mcL Final     RBC   Date Value Ref Range Status   04/10/2023 3.81 3.77 - 5.28 " 10*6/mm3 Final   03/22/2022 4.14 3.77 - 5.28 10*6/mm3 Final     Hemoglobin   Date Value Ref Range Status   04/10/2023 12.2 12.0 - 15.9 g/dL Final   06/05/2020 10.7 (L) 12.0 - 15.5 g/dL Final     Hematocrit   Date Value Ref Range Status   04/10/2023 35.6 34.0 - 46.6 % Final   06/05/2020 30.5 (L) 36.0 - 46.5 % Final     MCV   Date Value Ref Range Status   04/10/2023 93.4 79.0 - 97.0 fL Final   06/05/2020 88.2 78.0 - 100.0 fl Final     MCH   Date Value Ref Range Status   04/10/2023 32.0 26.6 - 33.0 pg Final   06/05/2020 30.9 26.0 - 34.0 pg Final     MCHC   Date Value Ref Range Status   04/10/2023 34.3 31.5 - 35.7 g/dL Final   06/05/2020 35.1 32.0 - 36.5 g/dL Final     RDW   Date Value Ref Range Status   04/10/2023 12.7 12.3 - 15.4 % Final   12/16/2019 3.55 (L) 4.00 - 5.20 mil/mcL Final   12/16/2019 13.0 11.0 - 15.0 % Final     RDW-SD   Date Value Ref Range Status   04/10/2023 43.6 37.0 - 54.0 fl Final   06/05/2020 38.2 (L) 39.0 - 50.0 fL Final     MPV   Date Value Ref Range Status   04/10/2023 9.2 6.0 - 12.0 fL Final     Platelets   Date Value Ref Range Status   04/10/2023 200 140 - 450 10*3/mm3 Final   06/05/2020 200 140 - 450 K/mcL Final     Neutrophil Rel %   Date Value Ref Range Status   12/16/2019 86 % Final     Neutrophil %   Date Value Ref Range Status   04/10/2023 80.5 (H) 42.7 - 76.0 % Final   06/05/2020 49 % Final     Lymphocyte Rel %   Date Value Ref Range Status   12/16/2019 8 % Final     Lymphocyte %   Date Value Ref Range Status   04/10/2023 12.3 (L) 19.6 - 45.3 % Final   06/05/2020 36 % Final     Monocyte Rel %   Date Value Ref Range Status   06/05/2020 12 % Final   12/16/2019 5 % Final     Monocyte %   Date Value Ref Range Status   04/10/2023 6.1 5.0 - 12.0 % Final     Eosinophil Rel %   Date Value Ref Range Status   12/16/2019 1 % Final     Eosinophil %   Date Value Ref Range Status   04/10/2023 0.2 (L) 0.3 - 6.2 % Final     Basophil Rel %   Date Value Ref Range Status   06/05/2020 1 % Final    12/16/2019 0 % Final     Basophil %   Date Value Ref Range Status   04/10/2023 0.3 0.0 - 1.5 % Final     Immature Grans %   Date Value Ref Range Status   04/10/2023 0.6 (H) 0.0 - 0.5 % Final   06/05/2020 1 % Final     Neutrophils Absolute   Date Value Ref Range Status   12/16/2019 5.6 1.8 - 7.7 K/mcL Final     Neutrophils, Absolute   Date Value Ref Range Status   04/10/2023 5.24 1.70 - 7.00 10*3/mm3 Final   06/05/2020 2.2 1.8 - 7.7 K/mcL Final     Lymphocytes Absolute   Date Value Ref Range Status   12/16/2019 0.5 (L) 1.0 - 4.0 K/mcL Final     Lymphocytes, Absolute   Date Value Ref Range Status   04/10/2023 0.80 0.70 - 3.10 10*3/mm3 Final   06/05/2020 1.5 1.0 - 4.0 K/mcL Final     Monocytes Absolute   Date Value Ref Range Status   06/05/2020 0.5 0.3 - 0.9 K/mcL Final   12/16/2019 0.3 0.3 - 0.9 K/mcL Final     Monocytes, Absolute   Date Value Ref Range Status   04/10/2023 0.40 0.10 - 0.90 10*3/mm3 Final     Eosinophils Absolute   Date Value Ref Range Status   06/05/2020 0.0 (L) 0.1 - 0.5 K/mcL Final   12/16/2019 0.0 (L) 0.1 - 0.5 K/mcL Final     Eosinophils, Absolute   Date Value Ref Range Status   04/10/2023 0.01 0.00 - 0.40 10*3/mm3 Final     Basophils Absolute   Date Value Ref Range Status   12/16/2019 0.0 0.0 - 0.3 K/mcL Final     Basophils, Absolute   Date Value Ref Range Status   04/10/2023 0.02 0.00 - 0.20 10*3/mm3 Final   06/05/2020 0.0 0.0 - 0.3 K/mcL Final     Immature Grans, Absolute   Date Value Ref Range Status   04/10/2023 0.04 0.00 - 0.05 10*3/mm3 Final   06/05/2020 0.0 0.0 - 0.2 K/mcL Final   06/15/2019 0.0 0 - 0.2 K/mcl Final     nRBC   Date Value Ref Range Status   04/10/2023 0.0 0.0 - 0.2 /100 WBC Final     Lab Results   Component Value Date    GLUCOSE 132 (H) 04/10/2023    BUN 15 04/10/2023    CREATININE 0.95 04/10/2023    EGFRRESULT 52.6 (L) 03/22/2022    EGFR 65.8 04/10/2023    BCR 15.8 04/10/2023    K 4.8 04/10/2023    CO2 22.4 04/10/2023    CALCIUM 8.9 04/10/2023    PROTENTOTREF 7.0  03/22/2022    ALBUMIN 4.0 04/08/2023    BILITOT 0.3 04/08/2023    AST 28 04/08/2023    ALT 21 04/08/2023     pH, Arterial   Date Value Ref Range Status   01/28/2018 7.37 7.35 - 7.45 Units Final     pCO2, Arterial   Date Value Ref Range Status   01/28/2018 44 32 - 45 mm Hg Final     HCO3, Arterial   Date Value Ref Range Status   01/28/2018 25 22 - 28 mmol/L Final     Oxyhemoglobin   Date Value Ref Range Status   01/28/2018 90 (L) 94 - 98 % Final     Results for orders placed in visit on 05/05/22    Adult Transthoracic Echo Complete W/ Cont if Necessary Per Protocol    Interpretation Summary  1.  Normal left ventricular size and systolic function, LVEF 65-70%.  2.  Mild concentric LVH.  3.  Indeterminate LV diastolic filling pattern.  4.  Normal right ventricular size and systolic function.  5.  Mildly increased left atrial volume index.  6.  Mild to moderate aortic stenosis (V-max 307 cm/s, mean gradient 17 mmHg, max 38 mmHg, DOMENIC 1.86 cm²).  7.  Trace MR and TR.    Assessment / Plan      Assessment/Plan:    Diagnoses and all orders for this visit:    1. Severe persistent asthma without complication (Primary)  -     Home Nebulizer Accessories  -     Pulmonary Function Test; Future  -     predniSONE (DELTASONE) 20 MG tablet; Take 2 tablets by mouth daily for 5 days  Dispense: 10 tablet; Refill: 0  -     Alpha - 1 - Antitrypsin Deficiency; Future  Alpha-1 antitrypsin deficiency test will be obtained. A sample of Trelegy will be given to the patient to trial in place of Breztri for 2 weeks. A prescription for prednisone will be sent to the patient's pharmacy for her to have on hand in the event that she begins to exacerbate. Follow up in 3 months with PFTs prior to appointment.    Follow Up:   Return in about 3 months (around 7/18/2023) for Recheck.  The patient was counseled on diagnostic results, risks and benefits of treatment options, risk factor modifications and the importance of treatment compliance. The patient  was advised to contact the clinic with concerns or worsening symptoms.     LILIBETH Hooker  Pulmonary Medicine [place]    Transcribed from ambient dictation for LILIBETH Hooker by [employer].  04/18/23   16:23 EDT    Patient or patient representative verbalized consent to the visit recording.  I have personally performed the services described in this document as transcribed by the above individual, and it is both accurate and complete.

## 2023-04-20 ENCOUNTER — HOSPITAL ENCOUNTER (OUTPATIENT)
Dept: INFUSION THERAPY | Facility: HOSPITAL | Age: 68
Discharge: HOME | End: 2023-04-20
Admitting: ALLERGY & IMMUNOLOGY
Payer: MEDICARE

## 2023-04-20 VITALS
RESPIRATION RATE: 18 BRPM | OXYGEN SATURATION: 99 % | HEART RATE: 70 BPM | DIASTOLIC BLOOD PRESSURE: 60 MMHG | TEMPERATURE: 97.2 F | SYSTOLIC BLOOD PRESSURE: 120 MMHG

## 2023-04-20 DIAGNOSIS — D80.1 COMMON VARIABLE AGAMMAGLOBULINEMIA: Primary | ICD-10-CM

## 2023-04-20 DIAGNOSIS — Z95.828 PORT-A-CATH IN PLACE: ICD-10-CM

## 2023-04-20 PROCEDURE — 25010000002 HEPARIN LOCK FLUSH PER 10 UNITS: Performed by: ALLERGY & IMMUNOLOGY

## 2023-04-20 PROCEDURE — 96365 THER/PROPH/DIAG IV INF INIT: CPT

## 2023-04-20 PROCEDURE — 96366 THER/PROPH/DIAG IV INF ADDON: CPT

## 2023-04-20 PROCEDURE — 25010000002 IMMUNE GLOBULIN (HUMAN) 20 GM/200ML SOLUTION: Performed by: ALLERGY & IMMUNOLOGY

## 2023-04-20 RX ORDER — PREDNISONE 20 MG/1
40 TABLET ORAL ONCE
Start: 2023-05-18 | End: 2023-05-18

## 2023-04-20 RX ORDER — HEPARIN SODIUM (PORCINE) LOCK FLUSH IV SOLN 100 UNIT/ML 100 UNIT/ML
500 SOLUTION INTRAVENOUS AS NEEDED
OUTPATIENT
Start: 2023-04-20

## 2023-04-20 RX ORDER — DIPHENHYDRAMINE HCL 25 MG
50 CAPSULE ORAL ONCE AS NEEDED
Status: DISCONTINUED | OUTPATIENT
Start: 2023-04-20 | End: 2023-04-22 | Stop reason: HOSPADM

## 2023-04-20 RX ORDER — PREDNISONE 20 MG/1
40 TABLET ORAL ONCE
Status: DISCONTINUED | OUTPATIENT
Start: 2023-04-20 | End: 2023-04-22 | Stop reason: HOSPADM

## 2023-04-20 RX ORDER — DIPHENHYDRAMINE HCL 25 MG
50 CAPSULE ORAL ONCE AS NEEDED
Start: 2023-05-18

## 2023-04-20 RX ORDER — PREDNISONE 20 MG/1
40 TABLET ORAL ONCE AS NEEDED
Start: 2023-05-18

## 2023-04-20 RX ORDER — SODIUM CHLORIDE 0.9 % (FLUSH) 0.9 %
10 SYRINGE (ML) INJECTION AS NEEDED
Status: DISCONTINUED | OUTPATIENT
Start: 2023-04-20 | End: 2023-04-22 | Stop reason: HOSPADM

## 2023-04-20 RX ORDER — EPINEPHRINE 1 MG/ML
0.3 INJECTION, SOLUTION INTRAMUSCULAR; SUBCUTANEOUS ONCE AS NEEDED
Status: DISCONTINUED | OUTPATIENT
Start: 2023-04-20 | End: 2023-04-22 | Stop reason: HOSPADM

## 2023-04-20 RX ORDER — METHYLPREDNISOLONE SODIUM SUCCINATE 125 MG/2ML
50 INJECTION, POWDER, LYOPHILIZED, FOR SOLUTION INTRAMUSCULAR; INTRAVENOUS ONCE AS NEEDED
Start: 2023-05-18

## 2023-04-20 RX ORDER — ACETAMINOPHEN 325 MG/1
325 TABLET ORAL ONCE
Status: DISCONTINUED | OUTPATIENT
Start: 2023-04-20 | End: 2023-04-22 | Stop reason: HOSPADM

## 2023-04-20 RX ORDER — HEPARIN SODIUM (PORCINE) LOCK FLUSH IV SOLN 100 UNIT/ML 100 UNIT/ML
500 SOLUTION INTRAVENOUS AS NEEDED
Status: DISCONTINUED | OUTPATIENT
Start: 2023-04-20 | End: 2023-04-22 | Stop reason: HOSPADM

## 2023-04-20 RX ORDER — METHYLPREDNISOLONE SODIUM SUCCINATE 125 MG/2ML
50 INJECTION, POWDER, LYOPHILIZED, FOR SOLUTION INTRAMUSCULAR; INTRAVENOUS ONCE AS NEEDED
Status: DISCONTINUED | OUTPATIENT
Start: 2023-04-20 | End: 2023-04-22 | Stop reason: HOSPADM

## 2023-04-20 RX ORDER — ACETAMINOPHEN 325 MG/1
325 TABLET ORAL ONCE
OUTPATIENT
Start: 2023-05-18

## 2023-04-20 RX ORDER — DIPHENHYDRAMINE HCL 25 MG
50 CAPSULE ORAL ONCE
Start: 2023-05-18 | End: 2023-05-18

## 2023-04-20 RX ORDER — SODIUM CHLORIDE 0.9 % (FLUSH) 0.9 %
10 SYRINGE (ML) INJECTION AS NEEDED
OUTPATIENT
Start: 2023-04-20

## 2023-04-20 RX ORDER — PREDNISONE 20 MG/1
40 TABLET ORAL ONCE AS NEEDED
Status: DISCONTINUED | OUTPATIENT
Start: 2023-04-20 | End: 2023-04-22 | Stop reason: HOSPADM

## 2023-04-20 RX ORDER — DIPHENHYDRAMINE HCL 25 MG
50 CAPSULE ORAL ONCE
Status: DISCONTINUED | OUTPATIENT
Start: 2023-04-20 | End: 2023-04-22 | Stop reason: HOSPADM

## 2023-04-20 RX ORDER — EPINEPHRINE 1 MG/ML
0.3 INJECTION, SOLUTION INTRAMUSCULAR; SUBCUTANEOUS ONCE AS NEEDED
Start: 2023-05-18

## 2023-04-20 RX ADMIN — Medication 500 UNITS: at 13:21

## 2023-04-20 RX ADMIN — Medication 10 ML: at 13:21

## 2023-04-20 RX ADMIN — IMMUNE GLOBULIN INFUSION (HUMAN) 20 G: 100 INJECTION, SOLUTION INTRAVENOUS; SUBCUTANEOUS at 09:25

## 2023-04-20 RX ADMIN — SODIUM CHLORIDE 500 ML: 9 INJECTION, SOLUTION INTRAVENOUS at 08:40

## 2023-04-20 RX ADMIN — IMMUNE GLOBULIN INFUSION (HUMAN) 20 G: 100 INJECTION, SOLUTION INTRAVENOUS; SUBCUTANEOUS at 11:28

## 2023-04-21 ENCOUNTER — READMISSION MANAGEMENT (OUTPATIENT)
Dept: CALL CENTER | Facility: HOSPITAL | Age: 68
End: 2023-04-21
Payer: MEDICARE

## 2023-04-21 NOTE — OUTREACH NOTE
COPD/PN Week 2 Survey    Flowsheet Row Responses   [facility] patient discharged from? [facility]   Does the patient have one of the following disease processes/diagnoses(primary or secondary)? COPD   Week 2 attempt successful? Yes   Call start time 1406   Call end time 1408   Discharge diagnosis Acute asthma/COPD exacerbation, Acute hypoxic respiratory failure, Acute parainfluenza virus   Person spoke with today (if not patient) and relationship Daughter Cally Ruffin reviewed with patient/caregiver? Yes   Does the patient have all medications ordered at discharge? Yes   Is the patient taking all medications as directed (includes completed medication regime)? Yes   Does the patient have a primary care provider?  Yes   Comments regarding PCP Seen her pcp on 04/13   Has the patient kept scheduled appointments due by today? N/A   Has home health visited the patient within 72 hours of discharge? N/A   Psychosocial issues? No   Did the patient receive a copy of their discharge instructions? Yes   Nursing interventions Reviewed instructions with patient   What is the patient's perception of their health status since discharge? Improving   Nursing Interventions Nurse provided patient education   Is the patient/caregiver able to teach back the hierarchy of who to call/visit for symptoms/problems? PCP, Specialist, Home health nurse, Urgent Care, ED, 911 Yes   Is the patient able to teach back COPD zones? Yes   Patient reports what zone on this call? Green Zone   Green Zone Reports doing well   Green Zone interventions: Take daily medications   Week 2 call completed? Yes   Is the patient interested in additional calls from an ambulatory ?  NOTE:  applies to high risk patients requiring additional follow-up. No   Wrap up additional comments Spoke with daughter- Patient is slowly improving- still has harsh cough. Patient going to infusions and has followed up with her pcp- no current concerns or questions  noted.          Cally MCKAY - Registered Nurse

## 2023-04-25 RX ORDER — ALLOPURINOL 100 MG/1
100 TABLET ORAL DAILY
Qty: 90 TABLET | Refills: 3 | Status: SHIPPED | OUTPATIENT
Start: 2023-04-25

## 2023-04-25 NOTE — TELEPHONE ENCOUNTER
Rx Refill Note  Requested Prescriptions     Pending Prescriptions Disp Refills    allopurinol (ZYLOPRIM) 100 MG tablet [Pharmacy Med Name: Allopurinol 100 MG Oral Tablet (Zyloprim)] 90 tablet 3     Sig: Take 1 tablet by mouth Daily.      Last office visit with prescribing clinician: 4/13/2023   Last telemedicine visit with prescribing clinician: 6/20/2023   Next office visit with prescribing clinician: 6/20/2023                         Hanna Ricci MA  04/25/23, 09:51 EDT

## 2023-04-27 RX ORDER — ALLOPURINOL 100 MG/1
100 TABLET ORAL DAILY
Qty: 90 TABLET | Refills: 3 | OUTPATIENT
Start: 2023-04-27

## 2023-04-28 ENCOUNTER — TELEPHONE (OUTPATIENT)
Dept: CARDIOLOGY | Facility: CLINIC | Age: 68
End: 2023-04-28
Payer: MEDICARE

## 2023-04-28 NOTE — TELEPHONE ENCOUNTER
Pt called stating that she is having to go under anesthesia 8/23 and was asking if she needs to have her aneurysm checked prior to this. Please advise

## 2023-05-01 DIAGNOSIS — J45.50 SEVERE PERSISTENT ASTHMA WITHOUT COMPLICATION: Primary | ICD-10-CM

## 2023-05-02 NOTE — PRE-PROCEDURE INSTRUCTIONS
PAT phone history completed with pt for upcoming procedure on  5/3/23 with Dr. Nation.    PAT PASS GIVEN/REVIEWED WITH PT.  VERBALIZED UNDERSTANDING OF THE FOLLOWING:  DO NOT EAT, DRINK, SMOKE, USE SMOKELESS TOBACCO OR CHEW GUM AFTER MIDNIGHT THE NIGHT BEFORE SURGERY.  THIS ALSO INCLUDES HARD CANDIES AND MINTS.    DO NOT SHAVE THE AREA TO BE OPERATED ON AT LEAST 48 HOURS PRIOR TO THE PROCEDURE.  DO NOT WEAR MAKE UP OR NAIL POLISH.  DO NOT LEAVE IN ANY PIERCING OR WEAR JEWELRY THE DAY OF SURGERY.      DO NOT USE ADHESIVES IF YOU WEAR DENTURES.    DO NOT WEAR EYE CONTACTS; BRING IN YOUR GLASSES.    ONLY TAKE MEDICATION THE MORNING OF YOUR PROCEDURE IF INSTRUCTED BY YOUR SURGEON WITH ENOUGH WATER TO SWALLOW THE MEDICATION.  IF YOUR SURGEON DID NOT SPECIFY WHICH MEDICATIONS TO TAKE, YOU WILL NEED TO CALL THEIR OFFICE FOR FURTHER INSTRUCTIONS AND DO AS THEY INSTRUCT.    LEAVE ANYTHING YOU CONSIDER VALUABLE AT HOME.    YOU WILL NEED TO ARRANGE FOR SOMEONE TO DRIVE YOU HOME AFTER SURGERY.  IT IS RECOMMENDED THAT YOU DO NOT DRIVE, WORK, DRINK ALCOHOL OR MAKE MAJOR DECISIONS FOR AT LEAST 24 HOURS AFTER YOUR PROCEDURE IS COMPLETE.      THE DAY OF YOUR PROCEDURE, BRING IN THE FOLLOWING IF APPLICABLE:   PICTURE ID AND INSURANCE/MEDICARE OR MEDICAID CARDS/ANY CO-PAY THAT MAY BE DUE   COPY OF ADVANCED DIRECTIVE/LIVING WILL/POWER OR    CPAP/BIPAP/INHALERS   SKIN PREP SHEET   YOUR PREADMISSION TESTING PASS (IF NOT A PHONE HISTORY)

## 2023-05-03 ENCOUNTER — HOSPITAL ENCOUNTER (OUTPATIENT)
Facility: HOSPITAL | Age: 68
Setting detail: HOSPITAL OUTPATIENT SURGERY
Discharge: HOME | End: 2023-05-03
Attending: UROLOGY | Admitting: UROLOGY
Payer: MEDICARE

## 2023-05-03 ENCOUNTER — ANESTHESIA (OUTPATIENT)
Dept: PERIOP | Facility: HOSPITAL | Age: 68
End: 2023-05-03
Payer: MEDICARE

## 2023-05-03 ENCOUNTER — APPOINTMENT (OUTPATIENT)
Dept: GENERAL RADIOLOGY | Facility: HOSPITAL | Age: 68
End: 2023-05-03
Payer: MEDICARE

## 2023-05-03 ENCOUNTER — ANESTHESIA EVENT (OUTPATIENT)
Dept: PERIOP | Facility: HOSPITAL | Age: 68
End: 2023-05-03
Payer: MEDICARE

## 2023-05-03 VITALS
DIASTOLIC BLOOD PRESSURE: 78 MMHG | RESPIRATION RATE: 17 BRPM | OXYGEN SATURATION: 98 % | TEMPERATURE: 97.6 F | SYSTOLIC BLOOD PRESSURE: 116 MMHG | HEART RATE: 89 BPM

## 2023-05-03 DIAGNOSIS — N39.41 URGE INCONTINENCE OF URINE: ICD-10-CM

## 2023-05-03 LAB — GLUCOSE BLDC GLUCOMTR-MCNC: 104 MG/DL (ref 70–130)

## 2023-05-03 PROCEDURE — 25010000002 ONDANSETRON PER 1 MG: Performed by: NURSE ANESTHETIST, CERTIFIED REGISTERED

## 2023-05-03 PROCEDURE — 25010000002 PROPOFOL 10 MG/ML EMULSION: Performed by: NURSE ANESTHETIST, CERTIFIED REGISTERED

## 2023-05-03 PROCEDURE — 76000 FLUOROSCOPY <1 HR PHYS/QHP: CPT

## 2023-05-03 PROCEDURE — 25010000002 MIDAZOLAM PER 1MG: Performed by: NURSE ANESTHETIST, CERTIFIED REGISTERED

## 2023-05-03 PROCEDURE — 82948 REAGENT STRIP/BLOOD GLUCOSE: CPT

## 2023-05-03 PROCEDURE — C1778 LEAD, NEUROSTIMULATOR: HCPCS | Performed by: UROLOGY

## 2023-05-03 PROCEDURE — 64590 INS/RPL PRPH SAC/GSTR NPG/R: CPT | Performed by: UROLOGY

## 2023-05-03 PROCEDURE — 64561 IMPLANT NEUROELECTRODES: CPT | Performed by: UROLOGY

## 2023-05-03 PROCEDURE — 25010000002 FENTANYL CITRATE (PF) 50 MCG/ML SOLUTION: Performed by: NURSE ANESTHETIST, CERTIFIED REGISTERED

## 2023-05-03 PROCEDURE — 76000 FLUOROSCOPY <1 HR PHYS/QHP: CPT | Performed by: UROLOGY

## 2023-05-03 PROCEDURE — C1767 GENERATOR, NEURO NON-RECHARG: HCPCS | Performed by: UROLOGY

## 2023-05-03 PROCEDURE — C1787 PATIENT PROGR, NEUROSTIM: HCPCS | Performed by: UROLOGY

## 2023-05-03 DEVICE — NEUROSTM SACRAL/NRV INTERSTIMX NONRECHG: Type: IMPLANTABLE DEVICE | Site: BACK | Status: FUNCTIONAL

## 2023-05-03 DEVICE — KT LD NEUROSTM INTERSTIM SURESCAN FULLBDY 4.32MM: Type: IMPLANTABLE DEVICE | Site: BACK | Status: FUNCTIONAL

## 2023-05-03 RX ORDER — CLINDAMYCIN PHOSPHATE 900 MG/50ML
900 INJECTION INTRAVENOUS ONCE
Status: COMPLETED | OUTPATIENT
Start: 2023-05-03 | End: 2023-05-03

## 2023-05-03 RX ORDER — MIDAZOLAM HYDROCHLORIDE 2 MG/2ML
INJECTION, SOLUTION INTRAMUSCULAR; INTRAVENOUS AS NEEDED
Status: DISCONTINUED | OUTPATIENT
Start: 2023-05-03 | End: 2023-05-03 | Stop reason: SURG

## 2023-05-03 RX ORDER — SODIUM CHLORIDE 9 MG/ML
500 INJECTION, SOLUTION INTRAVENOUS CONTINUOUS
Status: DISCONTINUED | OUTPATIENT
Start: 2023-05-03 | End: 2023-05-03 | Stop reason: HOSPADM

## 2023-05-03 RX ORDER — CLINDAMYCIN HYDROCHLORIDE 300 MG/1
300 CAPSULE ORAL 3 TIMES DAILY
Qty: 21 CAPSULE | Refills: 0 | Status: SHIPPED | OUTPATIENT
Start: 2023-05-03 | End: 2023-05-10

## 2023-05-03 RX ORDER — FENTANYL CITRATE 50 UG/ML
INJECTION, SOLUTION INTRAMUSCULAR; INTRAVENOUS AS NEEDED
Status: DISCONTINUED | OUTPATIENT
Start: 2023-05-03 | End: 2023-05-03 | Stop reason: SURG

## 2023-05-03 RX ORDER — ONDANSETRON 2 MG/ML
INJECTION INTRAMUSCULAR; INTRAVENOUS AS NEEDED
Status: DISCONTINUED | OUTPATIENT
Start: 2023-05-03 | End: 2023-05-03 | Stop reason: SURG

## 2023-05-03 RX ORDER — BUPIVACAINE HYDROCHLORIDE AND EPINEPHRINE 5; 5 MG/ML; UG/ML
INJECTION, SOLUTION EPIDURAL; INTRACAUDAL; PERINEURAL AS NEEDED
Status: DISCONTINUED | OUTPATIENT
Start: 2023-05-03 | End: 2023-05-03 | Stop reason: HOSPADM

## 2023-05-03 RX ORDER — LIDOCAINE HYDROCHLORIDE 20 MG/ML
INJECTION, SOLUTION INTRAVENOUS AS NEEDED
Status: DISCONTINUED | OUTPATIENT
Start: 2023-05-03 | End: 2023-05-03 | Stop reason: SURG

## 2023-05-03 RX ORDER — PROPOFOL 10 MG/ML
VIAL (ML) INTRAVENOUS AS NEEDED
Status: DISCONTINUED | OUTPATIENT
Start: 2023-05-03 | End: 2023-05-03 | Stop reason: SURG

## 2023-05-03 RX ORDER — KETAMINE HCL IN NACL, ISO-OSM 100MG/10ML
SYRINGE (ML) INJECTION AS NEEDED
Status: DISCONTINUED | OUTPATIENT
Start: 2023-05-03 | End: 2023-05-03 | Stop reason: SURG

## 2023-05-03 RX ORDER — MAGNESIUM HYDROXIDE 1200 MG/15ML
LIQUID ORAL AS NEEDED
Status: DISCONTINUED | OUTPATIENT
Start: 2023-05-03 | End: 2023-05-03 | Stop reason: HOSPADM

## 2023-05-03 RX ADMIN — LIDOCAINE HYDROCHLORIDE 60 MG: 20 INJECTION, SOLUTION INTRAVENOUS at 12:14

## 2023-05-03 RX ADMIN — PROPOFOL 100 MCG/KG/MIN: 10 INJECTION, EMULSION INTRAVENOUS at 12:14

## 2023-05-03 RX ADMIN — CLINDAMYCIN PHOSPHATE 900 MG: 900 INJECTION, SOLUTION INTRAVENOUS at 12:15

## 2023-05-03 RX ADMIN — FENTANYL CITRATE 25 MCG: 50 INJECTION INTRAMUSCULAR; INTRAVENOUS at 12:10

## 2023-05-03 RX ADMIN — ONDANSETRON 4 MG: 2 INJECTION INTRAMUSCULAR; INTRAVENOUS at 12:38

## 2023-05-03 RX ADMIN — Medication 10 MG: at 12:14

## 2023-05-03 RX ADMIN — FENTANYL CITRATE 25 MCG: 50 INJECTION INTRAMUSCULAR; INTRAVENOUS at 12:41

## 2023-05-03 RX ADMIN — PROPOFOL 50 MG: 10 INJECTION, EMULSION INTRAVENOUS at 12:14

## 2023-05-03 RX ADMIN — SODIUM CHLORIDE: 9 INJECTION, SOLUTION INTRAVENOUS at 12:50

## 2023-05-03 RX ADMIN — SODIUM CHLORIDE 500 ML: 9 INJECTION, SOLUTION INTRAVENOUS at 11:40

## 2023-05-03 RX ADMIN — MIDAZOLAM HYDROCHLORIDE 1 MG: 1 INJECTION, SOLUTION INTRAMUSCULAR; INTRAVENOUS at 12:10

## 2023-05-03 NOTE — DISCHARGE INSTRUCTIONS
Home Care After Interstim placement  The following instructions will help you care for yourself, or be cared for upon your return home today.  These are guidelines for your care right after surgery only.     Diet  Drink plenty of liquids and eat light meals today.    Start your regular diet tomorrow.    Activity  Start normal activities in twenty-four (24) hours.    Wound Care and Hygiene  No restrictions, start normal routine.  Keep dressing on until follow up visit.    Shower and bathing  You may shower 48 hours after surgery  You may take a bath in 2 weeks  Please avoid swimming for 2-4 weeks    Anesthesia Precautions & Expectations  After anesthesia, rest for 24 hours.  Do not drive, drink alcoholic beverages or make any important decisions during this time.  General anesthesia may cause a sore throat, jaw discomfort or muscle aches.  These symptoms can last for one or two days.     What to Expect after Surgery  Soreness over the incision.  Mild bleeding at the incision site.    Call your Doctor  Severe pain not controlled by oral medication  Temperature above 101.5 degrees  Inability to urinate within eight (8) hours after surgery  Drainage from the incision    Other Contacts  Urology Office:  [address] [facility] [facility] [id number]   [place] [address] [phone] office  [phone] fax

## 2023-05-03 NOTE — ANESTHESIA PREPROCEDURE EVALUATION
Anesthesia Evaluation     Patient summary reviewed and Nursing notes reviewed   no history of anesthetic complications:  NPO Solid Status: > 8 hours  NPO Liquid Status: > 8 hours           Airway   Dental      Pulmonary    (+) COPD, asthma,sleep apnea,   Cardiovascular     (+) hypertension, valvular problems/murmurs MR, CAD, angina, PVD, hyperlipidemia,       Neuro/Psych  (+) TIA, headaches, numbness, psychiatric history Anxiety and Depression,    GI/Hepatic/Renal/Endo    (+) obesity, morbid obesity,  renal disease, diabetes mellitus type 2 poorly controlled, thyroid problem hypothyroidism    Musculoskeletal     (+) arthralgias, back pain, chronic pain, myalgias,   Abdominal    Substance History      OB/GYN          Other   arthritis, blood dyscrasia anemia,     ROS/Med Hx Other: Gastric bypass  Labs reviewed  ekg sr  cxr nad   2022 echo Normal left ventricular size and systolic function, LVEF 65-70%.  2.  Mild concentric LVH.  3.  Indeterminate LV diastolic filling pattern.  4.  Normal right ventricular size and systolic function.  5.  Mildly increased left atrial volume index.  6.  Mild to moderate aortic stenosis (V-max 307 cm/s, mean gradient 17 mmHg, max 38 mmHg, DOMENIC 1.86 cm²).  7.  Trace MR and TR.                  Anesthesia Plan    ASA 3     MAC     intravenous induction     Anesthetic plan, risks, benefits, and alternatives have been provided, discussed and informed consent has been obtained with: patient.  Pre-procedure education provided      CODE STATUS:

## 2023-05-03 NOTE — H&P
CC  No chief complaint on file.       HPI  Raquel Mariee is a 67 y.o. with history of   1. Urge incontinence of urine         No recent fevers or new LUTS  Does not take any blood thinners    Past Medical History  Past Medical History:   Diagnosis Date   • Anxiety    • Aortic valve stenosis    • Cardiac murmur    • Chronic kidney disease    • Clostridium difficile infection     in her 30s   • COPD (chronic obstructive pulmonary disease)    • Depression    • Diabetes mellitus     type II   • Eosinophilic asthma    • Fibromyalgia, primary    • Hypertension    • Hypogammaglobulinemia    • Hypothyroidism    • Irritable bowel syndrome    • Migraines    • Morbid obesity    • Osteoarthritis    • Prinzmetal angina 1990   • Pseudomonas infection    • PTSD (post-traumatic stress disorder)    • Renal insufficiency    • Sinus tachycardia 1990   • Sleep apnea     no longer uses/needs cpap   • Tachycardia    • TIA (transient ischemic attack) 2017   • Trochanteric bursitis 01/25/2023       Past Surgical History  Past Surgical History:   Procedure Laterality Date   • APPENDECTOMY     • BUNIONECTOMY Left    • CARDIAC CATHETERIZATION  2017    no stents needed   • CARPAL TUNNEL RELEASE Right    • GASTRIC BYPASS     • HAND SURGERY Left    • REPLACEMENT TOTAL KNEE BILATERAL     • TEETH EXTRACTION      all of bottom teeth removed   • TONSILLECTOMY     • TOTAL ABDOMINAL HYSTERECTOMY WITH SALPINGO OOPHORECTOMY         Medications    Current Facility-Administered Medications:   •  clindamycin (CLEOCIN) 900 mg in dextrose 5% 50 mL IVPB (premix), 900 mg, Intravenous, Once, Abner Nation MD  •  sodium chloride 0.9 % infusion 500 mL, 500 mL, Intravenous, Continuous, Abner Nation MD, Last Rate: 25 mL/hr at 05/03/23 1140, 500 mL at 05/03/23 1140    Allergies  Allergies   Allergen Reactions   • Cefaclor Hives and Swelling     Other reaction(s): SWELLING  Shed 4 layers of skin and extremely SOB  Shed 4 layers of skin and  extremely SOB     • Cephalosporins Hives, Angioedema and Swelling     Rechallenge with cefipime caused rash on 1/14/08.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   • Verapamil Nausea And Vomiting     Dizzy   • Zolpidem Other (See Comments) and Unknown (See Comments)     Automatic behaviour in sleep.  Automatic behaviour in sleep.  confusion   • Cephalexin Rash   • Edecrin [Ethacrynic Acid] Rash and Other (See Comments)     Weight gain   • Hydrochlorothiazide Hives   • Hydrocodone-Acetaminophen GI Intolerance   • Sulfa Antibiotics Hives       Social History  Social History     Socioeconomic History   • Marital status:    Tobacco Use   • Smoking status: Never   • Smokeless tobacco: Never   Vaping Use   • Vaping Use: Never used   Substance and Sexual Activity   • Alcohol use: Not Currently     Comment: ONCE A YEAR   • Drug use: Never   • Sexual activity: Defer       Review of Systems  Constitutional: No fevers or chills  Skin: Negative for rash  Endocrine: No heat/cold intolerance   Cardiovascular: Negative for chest pain or dyspnea on exertion  Respiratory: Negative for shortness of breath or wheezing  Gastrointestinal: No constipation, nausea or vomiting  Genitourinary: Negative for new lower urinary tract symptoms, current gross hematuria or  dysuria.  Musculoskeletal: No flank pain  Neurological:  Negative for frequent headaches or dizziness  Lymph/Heme: Negative for leg swelling or calf pain.    Physical Exam  Visit Vitals  /89 (BP Location: Right arm, Patient Position: Sitting)   Pulse 85   Temp 97.9 °F (36.6 °C) (Temporal)   Resp 18   SpO2 98%     Constitutional: NAD, WDWN.   HEENT: NCAT. Conjunctivae normal.  MMM.    Cardiovascular: Regular rate.  Pulmonary/Chest: Respirations are even and unlabored bilaterally.  Abdominal: Soft. No distension, tenderness, masses or guarding. No CVA tenderness.  Neurological: A + O x 3.  Cranial Nerves II-XII grossly intact. Normal gait.  Extremities: MILLICENT x 4, Warm. No clubbing.  No cyanosis.    Skin: Pink, warm and dry.  No rashes noted.  Psychiatric:  Normal mood and affect    Labs & Imaging  Lab Results   Component Value Date    GLUCOSE 132 (H) 04/10/2023    CALCIUM 8.9 04/10/2023     (L) 04/10/2023    K 4.8 04/10/2023    CO2 22.4 04/10/2023     04/10/2023    BUN 15 04/10/2023    CREATININE 0.95 04/10/2023    EGFRIFAFRI 50 (L) 06/30/2021    EGFRIFNONA 48 (L) 02/14/2022    BCR 15.8 04/10/2023    ANIONGAP 10.6 04/10/2023     Lab Results   Component Value Date    WBC 6.51 04/10/2023    HGB 12.2 04/10/2023    HCT 35.6 04/10/2023    MCV 93.4 04/10/2023     04/10/2023     Brief Urine Lab Results     None             XR Chest 1 View    Result Date: 4/8/2023  PROCEDURE: XR CHEST 1 VW-  HISTORY: [facility]A Triage Protocol  COMPARISON: July 22, 2022.  FINDINGS: The heart is normal in size. Left subclavian port is stable in position. Left lung is clear. Postsurgical change again noted in the right hemithorax, stable from prior exam. No acute infiltrate is seen.. The mediastinum is unremarkable. There is no pneumothorax.  There are no acute osseous abnormalities. Apical lordotic positioning noted.      Stable chest.    This report was signed and finalized on 4/8/2023 7:45 AM by Lisa Castellon,  MD.          Assessment  Raquel Mariee is a 67 y.o. female who presents with the following diagnosis:  1. Urge incontinence of urine         Plan  1. To OR for INTERSTIM STAGES 1 AND 2 LEAD AND GENERATOR PLACEMENT     Abner Nation MD

## 2023-05-03 NOTE — OP NOTE
Operative Report      SURGEON: Abner Nation MD    PREOPERATIVE DIAGNOSIS: Refractory Urge Urinary Incontinence     POSTOPERATIVE DIAGNOSIS: Same     PROCEDURE PERFORMED:   1. First stage InterStim (placement of right S3 nerve stimulator lead). (CPT 47450)  2. Fluoroscopic guidance for needle placement. (76208-27)  3. Implantable pulse generator placement (41814)  4. Intra operative device programming    ANESTHESIA: Monitored anesthesia care    BLOOD LOSS: Minimal.     SPECIMEN: None.     COMPLICATION: None.     INDICATION FOR PROCEDURE: Pt Raquel Mariee is a 67 y.o. y.o.-year-old with Refractory Overactive bladder / UUI.  After discussion a decision was made to proceed with a trial of InterStim placement. The patient had received greater than 50% benefit from the PNE placement, Therefore the decision was made to proceed with a combined stage I and stage II InterStim placement. We discussed perioperative complication including bleeding, infection, lead migration, lead malfunction, pain at the leak site and possible need to replace the battery. An informed consent was obtained and placed in chart.     DESCRIPTION OF THE PROCEDURE:   The patient was taken to the operating theater, Identified by name and medical record number, and placed in supine position. Sequential compression  devices applied to both lower extremities. Pt underwent monitored anesthesia. The patient received intravenous antibiotics prior to the surgical procedure. An operative time-out was performed identifying the patient, the procedure to be performed and the surgeon. The patient was placed in prone position, carefully padding all pressure points. Surgical site was sterilely prepped and draped in a standard manner.     We performed fluoroscopy to enedina anatomical landmarks including the midline, ischial spine and S3 foramen. Local anesthetic was injected on right and left side at the level of S3 foramen site. We then used a spinal needle to  intubate the foramen on the right and left sides. We were able to successfully intubate both the S3 foramen, and this was tested to verify the exact location of S3 foramen.     On lead testing, we got an excellent motor response on the above side. At this point, a decision  was made to proceed with lead placement on that side. We used a  bidirectional needle through the spinal needle under fluoroscopy control. Following this, we used the lead introducer sheath over the bidirectional needle. Then we placed tined leads under fluoroscopy control, making sure that lead #3 is just at the level of anterior plate on lateral film. We tested all 4 electrodes and we got excellent motor response on all the 4 electrodes. The leads were deployed by releasing the lead itroducer sheath.     We created a future generator site by creating a pocket on the left side, approximately 3 cm in dimension. This incision was made with the help of a 15-knife and this deepened with the help of  Bovie electrocautery. With a combination of blunt and sharp dissection, we were able to create a nice pocket just above the gluteus  esther muscle into the subcutaneous tissue. The pocket was then irrigated copiously with antibiotic solution. With the help of a tunneling device, we tunneled the wire. The IPG was connected to the tined lead wire and this was  secured in place. The generator was placed in the previously created pocket. The subcutaneous tissue was closed with 3-0 vicryl and the skin was closed with 4-0 vicryl suture in a running fashion.     We tested the device intraoperatively with the help of [employer] test generator device and all circuits were functioning well. There was no evidence of short circuit, no impedance. This concluded the procedure. The patient was transferred to PACU in stable condition. The instrument, needle and sponge count was correct x2.

## 2023-05-03 NOTE — ANESTHESIA POSTPROCEDURE EVALUATION
Patient: Raquel Mariee    Procedure Summary     Date: 05/03/23 Room / Location: [facility] OR  /  [facility]    Anesthesia Start: 1204 Anesthesia Stop: 1300    Procedure: INTERSTIM STAGES 1 AND 2 LEAD AND GENERATOR PLACEMENT Diagnosis:       Urge incontinence of urine      (Urge incontinence of urine [N39.41])    Surgeons: Abner Nation MD Provider: aMvis Sweet CRNA    Anesthesia Type: MAC ASA Status: 3          Anesthesia Type: MAC    Vitals  Vitals Value Taken Time   /71 05/03/23 1302   Temp 97.6 °F (36.4 °C) 05/03/23 1302   Pulse 95 05/03/23 1302   Resp 16 05/03/23 1302   SpO2 100 % 05/03/23 1302           Post Anesthesia Care and Evaluation    Patient location during evaluation: PHASE II  Patient participation: complete - patient participated  Level of consciousness: awake and alert  Pain score: 1  Pain management: satisfactory to patient    Airway patency: patent  Anesthetic complications: No anesthetic complications  PONV Status: none  Cardiovascular status: acceptable and stable  Respiratory status: acceptable  Hydration status: acceptable    Comments: Vitals signs as noted in nursing documentation as per protocol.

## 2023-05-05 ENCOUNTER — TELEPHONE (OUTPATIENT)
Dept: CARDIOLOGY | Facility: CLINIC | Age: 68
End: 2023-05-05
Payer: MEDICARE

## 2023-05-05 DIAGNOSIS — I35.0 AORTIC STENOSIS, MILD: Primary | ICD-10-CM

## 2023-05-05 DIAGNOSIS — B37.9 CANDIDIASIS: Primary | ICD-10-CM

## 2023-05-05 RX ORDER — FLUCONAZOLE 150 MG/1
TABLET ORAL
Qty: 2 TABLET | Refills: 0 | Status: SHIPPED | OUTPATIENT
Start: 2023-05-05

## 2023-05-18 ENCOUNTER — HOSPITAL ENCOUNTER (OUTPATIENT)
Dept: INFUSION THERAPY | Facility: HOSPITAL | Age: 68
Discharge: HOME | End: 2023-05-18
Admitting: ALLERGY & IMMUNOLOGY
Payer: MEDICARE

## 2023-05-18 VITALS
BODY MASS INDEX: 34.3 KG/M2 | OXYGEN SATURATION: 95 % | DIASTOLIC BLOOD PRESSURE: 80 MMHG | SYSTOLIC BLOOD PRESSURE: 144 MMHG | RESPIRATION RATE: 18 BRPM | WEIGHT: 199.8 LBS | TEMPERATURE: 97.7 F | HEART RATE: 70 BPM

## 2023-05-18 DIAGNOSIS — Z95.828 PORT-A-CATH IN PLACE: ICD-10-CM

## 2023-05-18 DIAGNOSIS — D80.1 COMMON VARIABLE AGAMMAGLOBULINEMIA: Primary | ICD-10-CM

## 2023-05-18 PROCEDURE — 96366 THER/PROPH/DIAG IV INF ADDON: CPT

## 2023-05-18 PROCEDURE — 25010000002 HEPARIN LOCK FLUSH PER 10 UNITS: Performed by: ALLERGY & IMMUNOLOGY

## 2023-05-18 PROCEDURE — 96365 THER/PROPH/DIAG IV INF INIT: CPT

## 2023-05-18 PROCEDURE — 96361 HYDRATE IV INFUSION ADD-ON: CPT

## 2023-05-18 PROCEDURE — 25010000002 IMMUNE GLOBULIN (HUMAN) 20 GM/200ML SOLUTION: Performed by: ALLERGY & IMMUNOLOGY

## 2023-05-18 RX ORDER — PREDNISONE 20 MG/1
40 TABLET ORAL ONCE
Status: DISCONTINUED | OUTPATIENT
Start: 2023-05-18 | End: 2023-05-20 | Stop reason: HOSPADM

## 2023-05-18 RX ORDER — HEPARIN SODIUM (PORCINE) LOCK FLUSH IV SOLN 100 UNIT/ML 100 UNIT/ML
500 SOLUTION INTRAVENOUS AS NEEDED
OUTPATIENT
Start: 2023-05-18

## 2023-05-18 RX ORDER — METHYLPREDNISOLONE SODIUM SUCCINATE 125 MG/2ML
50 INJECTION, POWDER, LYOPHILIZED, FOR SOLUTION INTRAMUSCULAR; INTRAVENOUS ONCE AS NEEDED
Start: 2023-06-15

## 2023-05-18 RX ORDER — DIPHENHYDRAMINE HCL 25 MG
50 CAPSULE ORAL ONCE AS NEEDED
Start: 2023-06-15

## 2023-05-18 RX ORDER — PREDNISONE 20 MG/1
40 TABLET ORAL ONCE AS NEEDED
Start: 2023-06-15

## 2023-05-18 RX ORDER — SODIUM CHLORIDE 0.9 % (FLUSH) 0.9 %
10 SYRINGE (ML) INJECTION AS NEEDED
Status: DISCONTINUED | OUTPATIENT
Start: 2023-05-18 | End: 2023-05-20 | Stop reason: HOSPADM

## 2023-05-18 RX ORDER — AZITHROMYCIN 250 MG/1
TABLET, FILM COATED ORAL
Qty: 6 TABLET | Refills: 0 | Status: SHIPPED | OUTPATIENT
Start: 2023-05-18

## 2023-05-18 RX ORDER — DIPHENHYDRAMINE HCL 25 MG
50 CAPSULE ORAL ONCE
Start: 2023-06-15 | End: 2023-06-15

## 2023-05-18 RX ORDER — HEPARIN SODIUM (PORCINE) LOCK FLUSH IV SOLN 100 UNIT/ML 100 UNIT/ML
500 SOLUTION INTRAVENOUS AS NEEDED
Status: DISCONTINUED | OUTPATIENT
Start: 2023-05-18 | End: 2023-05-20 | Stop reason: HOSPADM

## 2023-05-18 RX ORDER — ACETAMINOPHEN 325 MG/1
325 TABLET ORAL ONCE
OUTPATIENT
Start: 2023-06-15

## 2023-05-18 RX ORDER — PREDNISONE 20 MG/1
40 TABLET ORAL ONCE
Start: 2023-06-15 | End: 2023-06-15

## 2023-05-18 RX ORDER — ACETAMINOPHEN 325 MG/1
325 TABLET ORAL ONCE
Status: DISCONTINUED | OUTPATIENT
Start: 2023-05-18 | End: 2023-05-20 | Stop reason: HOSPADM

## 2023-05-18 RX ORDER — SODIUM CHLORIDE 0.9 % (FLUSH) 0.9 %
10 SYRINGE (ML) INJECTION AS NEEDED
OUTPATIENT
Start: 2023-05-18

## 2023-05-18 RX ORDER — EPINEPHRINE 1 MG/ML
0.3 INJECTION, SOLUTION INTRAMUSCULAR; SUBCUTANEOUS ONCE AS NEEDED
Start: 2023-06-15

## 2023-05-18 RX ORDER — DIPHENHYDRAMINE HCL 25 MG
50 CAPSULE ORAL ONCE
Status: DISCONTINUED | OUTPATIENT
Start: 2023-05-18 | End: 2023-05-20 | Stop reason: HOSPADM

## 2023-05-18 RX ADMIN — IMMUNE GLOBULIN INFUSION (HUMAN) 20 G: 100 INJECTION, SOLUTION INTRAVENOUS; SUBCUTANEOUS at 11:01

## 2023-05-18 RX ADMIN — Medication 500 UNITS: at 11:49

## 2023-05-18 RX ADMIN — Medication 10 ML: at 11:49

## 2023-05-18 RX ADMIN — IMMUNE GLOBULIN INFUSION (HUMAN) 20 G: 100 INJECTION, SOLUTION INTRAVENOUS; SUBCUTANEOUS at 09:24

## 2023-05-18 RX ADMIN — SODIUM CHLORIDE 500 ML: 9 INJECTION, SOLUTION INTRAVENOUS at 08:46

## 2023-06-09 ENCOUNTER — OFFICE VISIT (OUTPATIENT)
Dept: UROLOGY | Facility: CLINIC | Age: 68
End: 2023-06-09
Payer: MEDICARE

## 2023-06-09 VITALS
HEART RATE: 78 BPM | TEMPERATURE: 97.5 F | BODY MASS INDEX: 34.15 KG/M2 | WEIGHT: 200 LBS | DIASTOLIC BLOOD PRESSURE: 70 MMHG | OXYGEN SATURATION: 98 % | SYSTOLIC BLOOD PRESSURE: 122 MMHG | HEIGHT: 64 IN

## 2023-06-09 DIAGNOSIS — N39.41 URGE INCONTINENCE OF URINE: Primary | ICD-10-CM

## 2023-06-09 RX ORDER — FLUTICASONE FUROATE, UMECLIDINIUM BROMIDE AND VILANTEROL TRIFENATATE 200; 62.5; 25 UG/1; UG/1; UG/1
1 POWDER RESPIRATORY (INHALATION) EVERY 24 HOURS
COMMUNITY

## 2023-06-09 RX ORDER — BUPROPION HYDROCHLORIDE 150 MG/1
TABLET, EXTENDED RELEASE ORAL
COMMUNITY

## 2023-06-09 NOTE — PROGRESS NOTES
Office Visit General Established Female Patient     Patient Name: Raquel Mariee  : 1955   MRN: [id number]     Chief Complaint:   Chief Complaint   Patient presents with    Follow-up     4 week interstim       Referring Provider: No ref. provider found    History of Present Illness: Raquel Mariee is a 67 y.o. female with history below in assessment, who presents for follow up.  Patient has urge incontinence and is status post InterStim stage I and II implant.  She reports this has been the best for weeks of her life, she has only had 2 mild leaking events and those were because she was in the car and could not get to a bathroom.  She does report she will still have occasional dribbling after she urinates and is scheduled with Dr. Kellen Galan for cystocele repair.      Subjective      Review of System:   As noted in HPI     Past Medical History:   Past Medical History:   Diagnosis Date    Anxiety     Aortic valve stenosis     Cardiac murmur     Chronic kidney disease     Clostridium difficile infection     in her 30s    COPD (chronic obstructive pulmonary disease)     Depression     Diabetes mellitus     type II    Eosinophilic asthma     Fibromyalgia, primary     Hypertension     Hypogammaglobulinemia     Hypothyroidism     Irritable bowel syndrome     Migraines     Morbid obesity     Osteoarthritis     Prinzmetal angina     Pseudomonas infection     PTSD (post-traumatic stress disorder)     Renal insufficiency     Sinus tachycardia     Sleep apnea     no longer uses/needs cpap    Tachycardia     TIA (transient ischemic attack) 2017    Trochanteric bursitis 2023       Past Surgical History:   Past Surgical History:   Procedure Laterality Date    APPENDECTOMY      BUNIONECTOMY Left     CARDIAC CATHETERIZATION  2017    no stents needed    CARPAL TUNNEL RELEASE Right     GASTRIC BYPASS      HAND SURGERY Left     INTERSTIM PLACEMENT N/A 5/3/2023    Procedure: INTERSTIM STAGES  1 AND 2 LEAD AND GENERATOR PLACEMENT;  Surgeon: Abner Nation MD;  Location: [facility] OR;  Service: Urology;  Laterality: N/A;    REPLACEMENT TOTAL KNEE BILATERAL      TEETH EXTRACTION      all of bottom teeth removed    TONSILLECTOMY      TOTAL ABDOMINAL HYSTERECTOMY WITH SALPINGO OOPHORECTOMY         Family History:   Family History   Problem Relation Age of Onset    Diabetes Mother     Stroke Mother     Heart disease Mother     Hypertension Mother     Endometriosis Mother     Diabetes Father     Hypertension Father     Stroke Father     Heart attack Father     Asthma Father     COPD Father     Dementia Father     COPD Sister     Asthma Sister     Cancer Sister     Brain cancer Sister     Diabetes Sister     Stroke Sister     Heart attack Sister     Endometriosis Sister     Depression Sister     Kidney disease Sister     Diabetes Sister     COPD Sister     Asthma Sister     Endometriosis Sister     Depression Sister     Heart attack Sister     Endometriosis Sister     COPD Brother     Asthma Brother     Diabetes Brother     Asthma Brother     COPD Brother     Diabetes Brother     Hypertension Brother     Asthma Daughter     Endometriosis Daughter     Osteoarthritis Daughter     Hypertension Daughter     Kidney failure Daughter     Irritable bowel syndrome Daughter     Anxiety disorder Daughter     Bipolar disorder Daughter     Depression Daughter     Self-Injurious Behavior  Daughter     Suicide Attempts Daughter     Asthma Daughter     Endometriosis Daughter     Osteoarthritis Daughter     Asthma Son     ADD / ADHD Son     Alcohol abuse Son     Drug abuse Son     Breast cancer Maternal Cousin     OCD Neg Hx     Paranoid behavior Neg Hx     Schizophrenia Neg Hx     Seizures Neg Hx     Ovarian cancer Neg Hx        Social History:   Social History     Socioeconomic History    Marital status:    Tobacco Use    Smoking status: Never    Smokeless tobacco: Never   Vaping Use    Vaping Use: Never used    Substance and Sexual Activity    Alcohol use: Not Currently     Comment: ONCE A YEAR    Drug use: Never    Sexual activity: Defer       Medications:     Current Outpatient Medications:     acetaminophen-codeine (TYLENOL #3) 300-30 MG per tablet, Take 1 tablet by mouth Every 4 (Four) Hours As Needed for Moderate Pain., Disp: 10 tablet, Rfl: 0    albuterol sulfate  (90 Base) MCG/ACT inhaler, Inhale 2 puffs by mouth every 4 to 6 hours as needed for cough, wheeze, shortness of breath. Use with vortex spacer, Disp: 8.5 g, Rfl: 3    allopurinol (ZYLOPRIM) 100 MG tablet, Take 1 tablet by mouth Daily., Disp: 90 tablet, Rfl: 3    azithromycin (Zithromax Z-Russ) 250 MG tablet, Take 2 tablets by mouth on day 1, then 1 tablet daily on days 2-5, Disp: 6 tablet, Rfl: 0    betamethasone valerate (VALISONE) 0.1 % cream, Apply topically to the appropriate area as directed Daily. (Patient taking differently: Apply  topically to the appropriate area as directed Daily As Needed.), Disp: 45 g, Rfl: 11    buPROPion (ZYBAN) 150 MG 12 hr tablet, Take 1 tablet twice a day by oral route., Disp: , Rfl:     buPROPion SR (WELLBUTRIN SR) 150 MG 12 hr tablet, Take 1 tablet by mouth 2 (Two) Times a Day., Disp: 180 tablet, Rfl: 3    calcium carbonate (Antacid Maximum) 1000 MG chewable tablet, Chew 500 mg 3 (Three) Times a Day., Disp: , Rfl:     cholecalciferol (VITAMIN D3) 25 MCG (1000 UT) tablet, Take 1 tablet by mouth Daily., Disp: , Rfl:     clonazePAM (KlonoPIN) 0.5 MG tablet, Take 1 tablet by mouth At Night As Needed, Disp: 30 tablet, Rfl: 2    Dextromethorphan-guaiFENesin (MUCINEX DM PO), Take 2 tablets by mouth 2 (Two) Times a Day., Disp: , Rfl:     dicyclomine (BENTYL) 10 MG capsule, Take 1 capsule by mouth 3 (Three) Times a Day., Disp: 270 capsule, Rfl: 3    diphenhydrAMINE (Benadryl Allergy) 25 MG tablet, Take 1-2 tablets by mouth Every 4-6 Hours As Needed., Disp: 90 tablet, Rfl: 11    estradiol (ESTRACE) 0.1 MG/GM vaginal cream,  Apply externally as directed to urethra twice weekly at bedtime., Disp: 42.5 g, Rfl: 3    ferrous sulfate 325 (65 FE) MG EC tablet, Take 1 tablet by mouth Daily With Breakfast., Disp: , Rfl:     fexofenadine (ALLEGRA) 180 MG tablet, Take 1 tablet by mouth Daily., Disp: , Rfl:     fluconazole (DIFLUCAN) 150 MG tablet, Take 1 tablet today and one in 3 days for symptoms, Disp: 2 tablet, Rfl: 0    fluticasone (FLONASE) 50 MCG/ACT nasal spray, Instill 2 sprays into each nostril daily. (Patient taking differently: 1 spray by Each Nare route Daily.), Disp: 16 g, Rfl: 11    Fluticasone-Umeclidin-Vilant (Trelegy Ellipta) 200-62.5-25 MCG/ACT aerosol powder , Inhale 1 puff Daily., Disp: , Rfl:     furosemide (LASIX) 40 MG tablet, Take 1 tablet by mouth Daily. Two days a month. (Patient taking differently: Take 1 tablet by mouth Daily As Needed (takes with infusions). Two days a month.), Disp: 30 tablet, Rfl: 1    glucosamine-chondroitin 500-400 MG capsule capsule, Take 1 capsule by mouth 2 (Two) Times a Day With Meals., Disp: , Rfl:     Hypertonic Nasal Wash (Sinus Rinse Kit) pack, use once or twice daily as needed, Disp: 1 each, Rfl: 3    immune globulin, human, (Gammagard) 20 GM/200ML solution infusion, Infuse 40 g into a venous catheter Every 28 (Twenty-Eight) Days., Disp: 400 mL, Rfl: 0    ipratropium (ATROVENT) 0.06 % nasal spray, Instill 1-2 sprays each nostril 2-3 times daily as needed, Disp: 15 mL, Rfl: 3    ipratropium-albuterol (DUO-NEB) 0.5-2.5 mg/3 ml nebulizer, Inhale the contents of 1 vial into nebulizer every 4-6 hours as needed for cough,wheezing and shortness of breath., Disp: 90 mL, Rfl: 3    isosorbide mononitrate (IMDUR) 30 MG 24 hr tablet, Take 1 tablet by mouth Every Morning., Disp: 90 tablet, Rfl: 3    levothyroxine (SYNTHROID, LEVOTHROID) 50 MCG tablet, Take 1 tablet by mouth Daily., Disp: 90 tablet, Rfl: 3    Magnesium 250 MG tablet, Take 2 tablets by mouth 2 (Two) Times a Day., Disp: , Rfl:      montelukast (SINGULAIR) 10 MG tablet, Take 1 tablet by mouth every night at bedtime., Disp: 30 tablet, Rfl: 11    multivitamin with minerals tablet tablet, Take 1 tablet by mouth Daily., Disp: , Rfl:     omeprazole (priLOSEC) 40 MG capsule, Take 1 capsule by mouth 2 (Two) Times a Day., Disp: 180 capsule, Rfl: 3    Omeprazole Magnesium (PRILOSEC PO), omeprazole, Disp: , Rfl:     ondansetron (ZOFRAN) 4 MG tablet, Take 1 tablet by mouth Every 8 (Eight) Hours As Needed for Nausea or Vomiting., Disp: 30 tablet, Rfl: 11    Ondansetron (ZUPLENZ PO), ondansetron, Disp: , Rfl:     POTASSIUM CHLORIDE PO, Take 20 mEq by mouth See Admin Instructions. Takes two times a month with Lasix, Disp: , Rfl:     potassium citrate (UROCIT-K) 10 MEQ (1080 MG) CR tablet, Take 1 tablet by mouth Daily., Disp: 90 tablet, Rfl: 3    predniSONE (DELTASONE) 10 MG tablet, Take 2 tablets daily for 3 days, then 1 tablet daily for 3 days, then 1/2 tablet daily for 2 days. (Patient taking differently: Take 1 tablet by mouth Daily As Needed (flare ups).), Disp: 10 tablet, Rfl: 0    predniSONE (DELTASONE) 20 MG tablet, Take 2 tablets prior to Gammgard infusion, Disp: 6 tablet, Rfl: 3    Semaglutide (Rybelsus) 7 MG tablet, Take 1 tablet by mouth Daily., Disp: 90 tablet, Rfl: 3    Spacer/Aero-Holding Chambers (AeroChamber Plus Wali-Vu) misc, Use as directed with albuterol inhaler, Disp: 1 each, Rfl: 0    Spacer/Aero-Holding Chambers (AeroChamber Plus Wali-Vu) misc, Use as directed with inhaler, Disp: 1 each, Rfl: 0    tiZANidine (ZANAFLEX) 4 MG tablet, Take 1 tablet by mouth Every Night., Disp: 90 tablet, Rfl: 1    ubrogepant (Ubrelvy) 100 MG tablet, Take 1 tablet by mouth 1 (One) Time As Needed (migraine) for up to 1 dose., Disp: 2 tablet, Rfl: 0    valsartan (DIOVAN) 160 MG tablet, Take 1 tablet by mouth Daily., Disp: 90 tablet, Rfl: 3    vitamin B-12 (CYANOCOBALAMIN) 1000 MCG tablet, Take 1 tablet by mouth Daily., Disp: , Rfl:     Current  "Facility-Administered Medications:     heparin injection 500 Units, 5 mL, Intravenous, PRN, Sanjeev Rawls MD    sodium chloride 0.9 % flush 10 mL, 10 mL, Intravenous, Q12H, Sanjeev Rawls MD    sodium chloride 0.9 % flush 10 mL, 10 mL, Intravenous, PRN, Sanjeev Rawls MD    sodium chloride 0.9 % flush 20 mL, 20 mL, Intravenous, PRSinai VANG Michael A, MD    Allergies:   Allergies   Allergen Reactions    Cefaclor Hives and Swelling     Other reaction(s): SWELLING  Shed 4 layers of skin and extremely SOB  Shed 4 layers of skin and extremely SOB      Cephalosporins Hives, Angioedema and Swelling     Rechallenge with cefipime caused rash on 1/14/08.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    Verapamil Nausea And Vomiting     Dizzy    Zolpidem Other (See Comments) and Unknown (See Comments)     Automatic behaviour in sleep.  Automatic behaviour in sleep.  confusion    Cephalexin Rash    Edecrin [Ethacrynic Acid] Rash and Other (See Comments)     Weight gain    Hydrochlorothiazide Hives    Hydrocodone-Acetaminophen GI Intolerance    Sulfa Antibiotics Hives       Objective     Physical Exam:   Vital Signs:   Visit Vitals  /70 (BP Location: Left arm, Patient Position: Sitting)   Pulse 78   Temp 97.5 °F (36.4 °C) (Temporal)   Ht 162.6 cm (64\")   Wt 90.7 " kg (200 lb)   SpO2 98%   BMI 34.33 kg/m²      Body mass index is 34.33 kg/m².     Physical exam notable for sacral incisions healed well, no redness or swelling.  Right buttocks incision healing well, no redness or swelling    Labs  Brief Urine Lab Results       None            Lab Results   Component Value Date    GLUCOSE 132 (H) 04/10/2023    CALCIUM 8.9 04/10/2023     (L) 04/10/2023    K 4.8 04/10/2023    CO2 22.4 04/10/2023     04/10/2023    BUN 15 04/10/2023    CREATININE 0.95 04/10/2023    EGFRIFAFRI 50 (L) 06/30/2021    EGFRIFNONA 48 (L) 02/14/2022    BCR 15.8 04/10/2023    ANIONGAP 10.6 04/10/2023       Lab Results   Component Value Date    WBC 6.51 04/10/2023    HGB 12.2 04/10/2023    HCT 35.6 04/10/2023    MCV 93.4 04/10/2023     04/10/2023         I have reviewed the above labs.     Assessment / Plan      Assessment/Plan:   Diagnoses and all orders for this visit:    1. Urge incontinence of urine (Primary)  -     POC Urinalysis Dipstick, Automated    67-year-old female status post InterStim stage I and II implant on 5/3/2023, patient is very pleased with results is down to minimal leaking only 2 in the last month. Program is set on 3 at 0.5 mA.  We discussed having a cystocele repaired and will reevaluate if dribbling is continuing after this.    Patient will return if any change in symptoms.    Follow Up:   Return in about 6 months (around 12/9/2023) for Follow up LILIBETH Branch, NP-C  [facility]

## 2023-06-12 RX ORDER — LEVOTHYROXINE SODIUM 0.05 MG/1
50 TABLET ORAL DAILY
Qty: 90 TABLET | Refills: 3 | Status: CANCELLED | OUTPATIENT
Start: 2023-06-12

## 2023-06-13 RX ORDER — LEVOTHYROXINE SODIUM 0.05 MG/1
50 TABLET ORAL DAILY
Qty: 90 TABLET | Refills: 3 | Status: SHIPPED | OUTPATIENT
Start: 2023-06-13

## 2023-06-13 RX ORDER — FUROSEMIDE 40 MG/1
40 TABLET ORAL DAILY
Qty: 30 TABLET | Refills: 1 | Status: SHIPPED | OUTPATIENT
Start: 2023-06-13

## 2023-06-15 ENCOUNTER — HOSPITAL ENCOUNTER (OUTPATIENT)
Dept: INFUSION THERAPY | Facility: HOSPITAL | Age: 68
Discharge: HOME | End: 2023-06-15
Payer: MEDICARE

## 2023-06-15 VITALS
DIASTOLIC BLOOD PRESSURE: 85 MMHG | TEMPERATURE: 98.5 F | SYSTOLIC BLOOD PRESSURE: 132 MMHG | HEART RATE: 76 BPM | RESPIRATION RATE: 20 BRPM | OXYGEN SATURATION: 94 %

## 2023-06-15 DIAGNOSIS — Z95.828 PORT-A-CATH IN PLACE: ICD-10-CM

## 2023-06-15 DIAGNOSIS — D80.1 COMMON VARIABLE AGAMMAGLOBULINEMIA: Primary | ICD-10-CM

## 2023-06-15 LAB
ALBUMIN SERPL-MCNC: 4.2 G/DL (ref 3.5–5.2)
ALBUMIN/GLOB SERPL: 1.6 G/DL
ALP SERPL-CCNC: 63 U/L (ref 39–117)
ALT SERPL W P-5'-P-CCNC: 18 U/L (ref 1–33)
ANION GAP SERPL CALCULATED.3IONS-SCNC: 10.5 MMOL/L (ref 5–15)
AST SERPL-CCNC: 25 U/L (ref 1–32)
BASOPHILS # BLD AUTO: 0.07 10*3/MM3 (ref 0–0.2)
BASOPHILS NFR BLD AUTO: 1.2 % (ref 0–1.5)
BILIRUB SERPL-MCNC: 0.4 MG/DL (ref 0–1.2)
BUN SERPL-MCNC: 14 MG/DL (ref 8–23)
BUN/CREAT SERPL: 11.8 (ref 7–25)
CALCIUM SPEC-SCNC: 9.2 MG/DL (ref 8.6–10.5)
CHLORIDE SERPL-SCNC: 99 MMOL/L (ref 98–107)
CO2 SERPL-SCNC: 24.5 MMOL/L (ref 22–29)
CREAT SERPL-MCNC: 1.19 MG/DL (ref 0.57–1)
DEPRECATED RDW RBC AUTO: 43 FL (ref 37–54)
EGFRCR SERPLBLD CKD-EPI 2021: 50.2 ML/MIN/1.73
EOSINOPHIL # BLD AUTO: 0.18 10*3/MM3 (ref 0–0.4)
EOSINOPHIL NFR BLD AUTO: 3.1 % (ref 0.3–6.2)
ERYTHROCYTE [DISTWIDTH] IN BLOOD BY AUTOMATED COUNT: 12.5 % (ref 12.3–15.4)
GLOBULIN UR ELPH-MCNC: 2.7 GM/DL
GLUCOSE SERPL-MCNC: 116 MG/DL (ref 65–99)
HCT VFR BLD AUTO: 36.7 % (ref 34–46.6)
HGB BLD-MCNC: 12.7 G/DL (ref 12–15.9)
IGG1 SER-MCNC: 1210 MG/DL (ref 700–1600)
IMM GRANULOCYTES # BLD AUTO: 0.03 10*3/MM3 (ref 0–0.05)
IMM GRANULOCYTES NFR BLD AUTO: 0.5 % (ref 0–0.5)
LYMPHOCYTES # BLD AUTO: 1.18 10*3/MM3 (ref 0.7–3.1)
LYMPHOCYTES NFR BLD AUTO: 20.2 % (ref 19.6–45.3)
MCH RBC QN AUTO: 32 PG (ref 26.6–33)
MCHC RBC AUTO-ENTMCNC: 34.6 G/DL (ref 31.5–35.7)
MCV RBC AUTO: 92.4 FL (ref 79–97)
MONOCYTES # BLD AUTO: 0.42 10*3/MM3 (ref 0.1–0.9)
MONOCYTES NFR BLD AUTO: 7.2 % (ref 5–12)
NEUTROPHILS NFR BLD AUTO: 3.97 10*3/MM3 (ref 1.7–7)
NEUTROPHILS NFR BLD AUTO: 67.8 % (ref 42.7–76)
NRBC BLD AUTO-RTO: 0 /100 WBC (ref 0–0.2)
PLATELET # BLD AUTO: 233 10*3/MM3 (ref 140–450)
PMV BLD AUTO: 8.8 FL (ref 6–12)
POTASSIUM SERPL-SCNC: 4.4 MMOL/L (ref 3.5–5.2)
PROT SERPL-MCNC: 6.9 G/DL (ref 6–8.5)
RBC # BLD AUTO: 3.97 10*6/MM3 (ref 3.77–5.28)
SODIUM SERPL-SCNC: 134 MMOL/L (ref 136–145)
WBC NRBC COR # BLD: 5.85 10*3/MM3 (ref 3.4–10.8)

## 2023-06-15 PROCEDURE — 96365 THER/PROPH/DIAG IV INF INIT: CPT

## 2023-06-15 PROCEDURE — 96366 THER/PROPH/DIAG IV INF ADDON: CPT

## 2023-06-15 PROCEDURE — 25010000002 IMMUNE GLOBULIN (HUMAN) 20 GM/200ML SOLUTION: Performed by: ALLERGY & IMMUNOLOGY

## 2023-06-15 PROCEDURE — 80053 COMPREHEN METABOLIC PANEL: CPT | Performed by: ALLERGY & IMMUNOLOGY

## 2023-06-15 PROCEDURE — 85025 COMPLETE CBC W/AUTO DIFF WBC: CPT | Performed by: ALLERGY & IMMUNOLOGY

## 2023-06-15 PROCEDURE — 25010000002 HEPARIN LOCK FLUSH PER 10 UNITS: Performed by: ALLERGY & IMMUNOLOGY

## 2023-06-15 PROCEDURE — 82784 ASSAY IGA/IGD/IGG/IGM EACH: CPT | Performed by: ALLERGY & IMMUNOLOGY

## 2023-06-15 RX ORDER — DIPHENHYDRAMINE HCL 25 MG
50 CAPSULE ORAL ONCE AS NEEDED
Start: 2023-07-13

## 2023-06-15 RX ORDER — PREDNISONE 20 MG/1
40 TABLET ORAL ONCE
Start: 2023-07-13 | End: 2023-07-13

## 2023-06-15 RX ORDER — HEPARIN SODIUM (PORCINE) LOCK FLUSH IV SOLN 100 UNIT/ML 100 UNIT/ML
500 SOLUTION INTRAVENOUS AS NEEDED
OUTPATIENT
Start: 2023-06-15

## 2023-06-15 RX ORDER — ACETAMINOPHEN 325 MG/1
325 TABLET ORAL ONCE
OUTPATIENT
Start: 2023-07-13

## 2023-06-15 RX ORDER — METHYLPREDNISOLONE SODIUM SUCCINATE 125 MG/2ML
50 INJECTION, POWDER, LYOPHILIZED, FOR SOLUTION INTRAMUSCULAR; INTRAVENOUS ONCE AS NEEDED
Start: 2023-07-13

## 2023-06-15 RX ORDER — PREDNISONE 20 MG/1
40 TABLET ORAL ONCE AS NEEDED
Start: 2023-07-13

## 2023-06-15 RX ORDER — HEPARIN SODIUM (PORCINE) LOCK FLUSH IV SOLN 100 UNIT/ML 100 UNIT/ML
500 SOLUTION INTRAVENOUS AS NEEDED
Status: DISCONTINUED | OUTPATIENT
Start: 2023-06-15 | End: 2023-06-17 | Stop reason: HOSPADM

## 2023-06-15 RX ORDER — EPINEPHRINE 1 MG/ML
0.3 INJECTION, SOLUTION INTRAMUSCULAR; SUBCUTANEOUS ONCE AS NEEDED
Start: 2023-07-13

## 2023-06-15 RX ORDER — SODIUM CHLORIDE 0.9 % (FLUSH) 0.9 %
10 SYRINGE (ML) INJECTION AS NEEDED
OUTPATIENT
Start: 2023-06-15

## 2023-06-15 RX ORDER — DIPHENHYDRAMINE HCL 25 MG
50 CAPSULE ORAL ONCE
Status: DISCONTINUED | OUTPATIENT
Start: 2023-06-15 | End: 2023-06-17 | Stop reason: HOSPADM

## 2023-06-15 RX ORDER — SODIUM CHLORIDE 0.9 % (FLUSH) 0.9 %
10 SYRINGE (ML) INJECTION AS NEEDED
Status: DISCONTINUED | OUTPATIENT
Start: 2023-06-15 | End: 2023-06-17 | Stop reason: HOSPADM

## 2023-06-15 RX ORDER — PREDNISONE 20 MG/1
40 TABLET ORAL ONCE
Status: DISCONTINUED | OUTPATIENT
Start: 2023-06-15 | End: 2023-06-17 | Stop reason: HOSPADM

## 2023-06-15 RX ORDER — ACETAMINOPHEN 325 MG/1
325 TABLET ORAL ONCE
Status: DISCONTINUED | OUTPATIENT
Start: 2023-06-15 | End: 2023-06-17 | Stop reason: HOSPADM

## 2023-06-15 RX ORDER — DIPHENHYDRAMINE HCL 25 MG
50 CAPSULE ORAL ONCE
Start: 2023-07-13 | End: 2023-07-13

## 2023-06-15 RX ADMIN — Medication 10 ML: at 13:30

## 2023-06-15 RX ADMIN — IMMUNE GLOBULIN INFUSION (HUMAN) 20 G: 100 INJECTION, SOLUTION INTRAVENOUS; SUBCUTANEOUS at 09:42

## 2023-06-15 RX ADMIN — HEPARIN 500 UNITS: 100 SYRINGE at 13:30

## 2023-06-15 RX ADMIN — SODIUM CHLORIDE 500 ML: 9 INJECTION, SOLUTION INTRAVENOUS at 08:48

## 2023-06-15 RX ADMIN — IMMUNE GLOBULIN INFUSION (HUMAN) 20 G: 100 INJECTION, SOLUTION INTRAVENOUS; SUBCUTANEOUS at 11:54

## 2023-07-13 LAB
ALBUMIN SERPL-MCNC: 4.3 G/DL (ref 3.5–5.2)
ALBUMIN UR-MCNC: <1.2 MG/DL
ALBUMIN/GLOB SERPL: 1.5 G/DL
ALP SERPL-CCNC: 65 U/L (ref 39–117)
ALT SERPL W P-5'-P-CCNC: 25 U/L (ref 1–33)
ANION GAP SERPL CALCULATED.3IONS-SCNC: 10.8 MMOL/L (ref 5–15)
AST SERPL-CCNC: 31 U/L (ref 1–32)
BASOPHILS # BLD AUTO: 0.07 10*3/MM3 (ref 0–0.2)
BASOPHILS NFR BLD AUTO: 1.5 % (ref 0–1.5)
BILIRUB SERPL-MCNC: 0.5 MG/DL (ref 0–1.2)
BUN SERPL-MCNC: 13 MG/DL (ref 8–23)
BUN/CREAT SERPL: 11.8 (ref 7–25)
CALCIUM SPEC-SCNC: 9.2 MG/DL (ref 8.6–10.5)
CHLORIDE SERPL-SCNC: 100 MMOL/L (ref 98–107)
CHOLEST SERPL-MCNC: 200 MG/DL (ref 0–200)
CO2 SERPL-SCNC: 24.2 MMOL/L (ref 22–29)
CREAT SERPL-MCNC: 1.1 MG/DL (ref 0.57–1)
DEPRECATED RDW RBC AUTO: 43.7 FL (ref 37–54)
EGFRCR SERPLBLD CKD-EPI 2021: 55.2 ML/MIN/1.73
EOSINOPHIL # BLD AUTO: 0.17 10*3/MM3 (ref 0–0.4)
EOSINOPHIL NFR BLD AUTO: 3.7 % (ref 0.3–6.2)
ERYTHROCYTE [DISTWIDTH] IN BLOOD BY AUTOMATED COUNT: 12.6 % (ref 12.3–15.4)
GLOBULIN UR ELPH-MCNC: 2.8 GM/DL
GLUCOSE SERPL-MCNC: 150 MG/DL (ref 65–99)
HBA1C MFR BLD: 5.5 % (ref 4.8–5.6)
HCT VFR BLD AUTO: 37 % (ref 34–46.6)
HDLC SERPL-MCNC: 86 MG/DL (ref 40–60)
HGB BLD-MCNC: 12.9 G/DL (ref 12–15.9)
IMM GRANULOCYTES # BLD AUTO: 0.02 10*3/MM3 (ref 0–0.05)
IMM GRANULOCYTES NFR BLD AUTO: 0.4 % (ref 0–0.5)
LDLC SERPL CALC-MCNC: 104 MG/DL (ref 0–100)
LDLC/HDLC SERPL: 1.2 {RATIO}
LYMPHOCYTES # BLD AUTO: 1.46 10*3/MM3 (ref 0.7–3.1)
LYMPHOCYTES NFR BLD AUTO: 32.2 % (ref 19.6–45.3)
MCH RBC QN AUTO: 32.8 PG (ref 26.6–33)
MCHC RBC AUTO-ENTMCNC: 34.9 G/DL (ref 31.5–35.7)
MCV RBC AUTO: 94.1 FL (ref 79–97)
MONOCYTES # BLD AUTO: 0.37 10*3/MM3 (ref 0.1–0.9)
MONOCYTES NFR BLD AUTO: 8.1 % (ref 5–12)
NEUTROPHILS NFR BLD AUTO: 2.45 10*3/MM3 (ref 1.7–7)
NEUTROPHILS NFR BLD AUTO: 54.1 % (ref 42.7–76)
NRBC BLD AUTO-RTO: 0 /100 WBC (ref 0–0.2)
PLATELET # BLD AUTO: 212 10*3/MM3 (ref 140–450)
PMV BLD AUTO: 9 FL (ref 6–12)
POTASSIUM SERPL-SCNC: 4.3 MMOL/L (ref 3.5–5.2)
PROT SERPL-MCNC: 7.1 G/DL (ref 6–8.5)
RBC # BLD AUTO: 3.93 10*6/MM3 (ref 3.77–5.28)
SODIUM SERPL-SCNC: 135 MMOL/L (ref 136–145)
T4 FREE SERPL-MCNC: 1.07 NG/DL (ref 0.93–1.7)
TRIGL SERPL-MCNC: 53 MG/DL (ref 0–150)
TSH SERPL DL<=0.05 MIU/L-ACNC: 1.34 UIU/ML (ref 0.27–4.2)
VIT B12 BLD-MCNC: 1832 PG/ML (ref 211–946)
VLDLC SERPL-MCNC: 10 MG/DL (ref 5–40)
WBC NRBC COR # BLD: 4.54 10*3/MM3 (ref 3.4–10.8)

## 2023-07-24 RX ORDER — POTASSIUM CITRATE 10 MEQ/1
10 TABLET, EXTENDED RELEASE ORAL DAILY
Qty: 90 TABLET | Refills: 3 | Status: SHIPPED | OUTPATIENT
Start: 2023-07-24

## 2023-07-30 DIAGNOSIS — Z79.4 TYPE 2 DIABETES MELLITUS WITHOUT COMPLICATION, WITH LONG-TERM CURRENT USE OF INSULIN: ICD-10-CM

## 2023-07-30 DIAGNOSIS — E11.9 TYPE 2 DIABETES MELLITUS WITHOUT COMPLICATION, WITH LONG-TERM CURRENT USE OF INSULIN: ICD-10-CM

## 2023-07-31 ENCOUNTER — PRE-ADMISSION TESTING (OUTPATIENT)
Dept: PREADMISSION TESTING | Facility: HOSPITAL | Age: 68
End: 2023-07-31
Payer: MEDICARE

## 2023-07-31 VITALS — BODY MASS INDEX: 35.3 KG/M2 | HEIGHT: 64 IN | WEIGHT: 206.79 LBS

## 2023-07-31 DIAGNOSIS — N81.6 RECTOCELE: ICD-10-CM

## 2023-07-31 DIAGNOSIS — Z01.818 PREOPERATIVE TESTING: ICD-10-CM

## 2023-07-31 LAB
ANION GAP SERPL CALCULATED.3IONS-SCNC: 8 MMOL/L (ref 5–15)
BASOPHILS # BLD AUTO: 0.06 10*3/MM3 (ref 0–0.2)
BASOPHILS NFR BLD AUTO: 1.2 % (ref 0–1.5)
BUN SERPL-MCNC: 12 MG/DL (ref 8–23)
BUN/CREAT SERPL: 12.8 (ref 7–25)
CALCIUM SPEC-SCNC: 9 MG/DL (ref 8.6–10.5)
CHLORIDE SERPL-SCNC: 102 MMOL/L (ref 98–107)
CO2 SERPL-SCNC: 24 MMOL/L (ref 22–29)
CREAT SERPL-MCNC: 0.94 MG/DL (ref 0.57–1)
DEPRECATED RDW RBC AUTO: 45.9 FL (ref 37–54)
EGFRCR SERPLBLD CKD-EPI 2021: 66.6 ML/MIN/1.73
EOSINOPHIL # BLD AUTO: 0.18 10*3/MM3 (ref 0–0.4)
EOSINOPHIL NFR BLD AUTO: 3.6 % (ref 0.3–6.2)
ERYTHROCYTE [DISTWIDTH] IN BLOOD BY AUTOMATED COUNT: 12.8 % (ref 12.3–15.4)
GLUCOSE SERPL-MCNC: 112 MG/DL (ref 65–99)
HCT VFR BLD AUTO: 37 % (ref 34–46.6)
HGB BLD-MCNC: 12.3 G/DL (ref 12–15.9)
IMM GRANULOCYTES # BLD AUTO: 0.02 10*3/MM3 (ref 0–0.05)
IMM GRANULOCYTES NFR BLD AUTO: 0.4 % (ref 0–0.5)
LYMPHOCYTES # BLD AUTO: 1.4 10*3/MM3 (ref 0.7–3.1)
LYMPHOCYTES NFR BLD AUTO: 28.1 % (ref 19.6–45.3)
MCH RBC QN AUTO: 32.5 PG (ref 26.6–33)
MCHC RBC AUTO-ENTMCNC: 33.2 G/DL (ref 31.5–35.7)
MCV RBC AUTO: 97.6 FL (ref 79–97)
MONOCYTES # BLD AUTO: 0.37 10*3/MM3 (ref 0.1–0.9)
MONOCYTES NFR BLD AUTO: 7.4 % (ref 5–12)
NEUTROPHILS NFR BLD AUTO: 2.95 10*3/MM3 (ref 1.7–7)
NEUTROPHILS NFR BLD AUTO: 59.3 % (ref 42.7–76)
NRBC BLD AUTO-RTO: 0 /100 WBC (ref 0–0.2)
PLATELET # BLD AUTO: 224 10*3/MM3 (ref 140–450)
PMV BLD AUTO: 8.7 FL (ref 6–12)
POTASSIUM SERPL-SCNC: 4.3 MMOL/L (ref 3.5–5.2)
RBC # BLD AUTO: 3.79 10*6/MM3 (ref 3.77–5.28)
SODIUM SERPL-SCNC: 134 MMOL/L (ref 136–145)
WBC NRBC COR # BLD: 4.98 10*3/MM3 (ref 3.4–10.8)

## 2023-07-31 PROCEDURE — 36415 COLL VENOUS BLD VENIPUNCTURE: CPT

## 2023-07-31 PROCEDURE — 85025 COMPLETE CBC W/AUTO DIFF WBC: CPT

## 2023-07-31 PROCEDURE — 80048 BASIC METABOLIC PNL TOTAL CA: CPT

## 2023-07-31 RX ORDER — MONTELUKAST SODIUM 10 MG/1
10 TABLET ORAL
Qty: 90 TABLET | Refills: 3 | Status: SHIPPED | OUTPATIENT
Start: 2023-07-31

## 2023-07-31 RX ORDER — SODIUM CHLORIDE 9 MG/ML
40 INJECTION, SOLUTION INTRAVENOUS AS NEEDED
Status: CANCELLED | OUTPATIENT
Start: 2023-07-31

## 2023-07-31 RX ORDER — SODIUM CHLORIDE 0.9 % (FLUSH) 0.9 %
10 SYRINGE (ML) INJECTION EVERY 12 HOURS SCHEDULED
Status: CANCELLED | OUTPATIENT
Start: 2023-07-31

## 2023-07-31 RX ORDER — SODIUM CHLORIDE 0.9 % (FLUSH) 0.9 %
10 SYRINGE (ML) INJECTION AS NEEDED
Status: CANCELLED | OUTPATIENT
Start: 2023-07-31

## 2023-08-01 ENCOUNTER — ANESTHESIA EVENT (OUTPATIENT)
Dept: PERIOP | Facility: HOSPITAL | Age: 68
End: 2023-08-01
Payer: MEDICARE

## 2023-08-01 RX ORDER — SODIUM CHLORIDE 0.9 % (FLUSH) 0.9 %
10 SYRINGE (ML) INJECTION EVERY 12 HOURS SCHEDULED
Status: CANCELLED | OUTPATIENT
Start: 2023-08-01

## 2023-08-01 RX ORDER — SODIUM CHLORIDE 0.9 % (FLUSH) 0.9 %
10 SYRINGE (ML) INJECTION AS NEEDED
Status: CANCELLED | OUTPATIENT
Start: 2023-08-01

## 2023-08-01 RX ORDER — FAMOTIDINE 10 MG/ML
20 INJECTION, SOLUTION INTRAVENOUS ONCE
Status: CANCELLED | OUTPATIENT
Start: 2023-08-01 | End: 2023-08-01

## 2023-08-01 RX ORDER — SODIUM CHLORIDE 9 MG/ML
40 INJECTION, SOLUTION INTRAVENOUS AS NEEDED
Status: CANCELLED | OUTPATIENT
Start: 2023-08-01

## 2023-08-02 ENCOUNTER — HOSPITAL ENCOUNTER (OUTPATIENT)
Facility: HOSPITAL | Age: 68
Setting detail: HOSPITAL OUTPATIENT SURGERY
Discharge: HOME | End: 2023-08-02
Attending: OBSTETRICS & GYNECOLOGY | Admitting: OBSTETRICS & GYNECOLOGY
Payer: MEDICARE

## 2023-08-02 ENCOUNTER — ANESTHESIA EVENT CONVERTED (OUTPATIENT)
Dept: ANESTHESIOLOGY | Facility: HOSPITAL | Age: 68
End: 2023-08-02
Payer: MEDICARE

## 2023-08-02 ENCOUNTER — ANESTHESIA (OUTPATIENT)
Dept: PERIOP | Facility: HOSPITAL | Age: 68
End: 2023-08-02
Payer: MEDICARE

## 2023-08-02 VITALS
BODY MASS INDEX: 35.3 KG/M2 | HEIGHT: 64 IN | OXYGEN SATURATION: 92 % | DIASTOLIC BLOOD PRESSURE: 79 MMHG | RESPIRATION RATE: 16 BRPM | SYSTOLIC BLOOD PRESSURE: 155 MMHG | WEIGHT: 206.79 LBS | TEMPERATURE: 97.2 F | HEART RATE: 72 BPM

## 2023-08-02 DIAGNOSIS — Z01.818 PREOPERATIVE TESTING: ICD-10-CM

## 2023-08-02 DIAGNOSIS — N81.6 RECTOCELE: Primary | ICD-10-CM

## 2023-08-02 LAB — GLUCOSE BLDC GLUCOMTR-MCNC: 114 MG/DL (ref 70–130)

## 2023-08-02 PROCEDURE — 25010000002 CLINDAMYCIN 600 MG/50ML SOLUTION: Performed by: PHYSICIAN ASSISTANT

## 2023-08-02 PROCEDURE — 25010000002 SUGAMMADEX 200 MG/2ML SOLUTION

## 2023-08-02 PROCEDURE — 82948 REAGENT STRIP/BLOOD GLUCOSE: CPT

## 2023-08-02 PROCEDURE — 25010000002 DROPERIDOL PER 5 MG

## 2023-08-02 PROCEDURE — 25010000002 DEXAMETHASONE SODIUM PHOSPHATE 10 MG/ML SOLUTION

## 2023-08-02 PROCEDURE — 25010000002 ESMOLOL 100 MG/10ML SOLUTION

## 2023-08-02 PROCEDURE — 25010000002 PROPOFOL 10 MG/ML EMULSION

## 2023-08-02 PROCEDURE — 25010000002 GENTAMICIN PER 80 MG: Performed by: PHYSICIAN ASSISTANT

## 2023-08-02 PROCEDURE — 25010000002 HEPARIN (PORCINE) PER 1000 UNITS: Performed by: PHYSICIAN ASSISTANT

## 2023-08-02 PROCEDURE — 25010000002 FENTANYL CITRATE (PF) 100 MCG/2ML SOLUTION

## 2023-08-02 PROCEDURE — 25010000002 ONDANSETRON PER 1 MG

## 2023-08-02 PROCEDURE — 25010000002 DIPHENHYDRAMINE PER 50 MG

## 2023-08-02 RX ORDER — LABETALOL HYDROCHLORIDE 5 MG/ML
5 INJECTION, SOLUTION INTRAVENOUS
Status: DISCONTINUED | OUTPATIENT
Start: 2023-08-02 | End: 2023-08-02 | Stop reason: HOSPADM

## 2023-08-02 RX ORDER — DIPHENHYDRAMINE HYDROCHLORIDE 50 MG/ML
INJECTION INTRAMUSCULAR; INTRAVENOUS AS NEEDED
Status: DISCONTINUED | OUTPATIENT
Start: 2023-08-02 | End: 2023-08-02 | Stop reason: SURG

## 2023-08-02 RX ORDER — DROPERIDOL 2.5 MG/ML
INJECTION, SOLUTION INTRAMUSCULAR; INTRAVENOUS
Status: COMPLETED
Start: 2023-08-02 | End: 2023-08-02

## 2023-08-02 RX ORDER — HYDROMORPHONE HYDROCHLORIDE 1 MG/ML
0.5 INJECTION, SOLUTION INTRAMUSCULAR; INTRAVENOUS; SUBCUTANEOUS
Status: DISCONTINUED | OUTPATIENT
Start: 2023-08-02 | End: 2023-08-02 | Stop reason: HOSPADM

## 2023-08-02 RX ORDER — HEPARIN SODIUM 5000 [USP'U]/ML
5000 INJECTION, SOLUTION INTRAVENOUS; SUBCUTANEOUS EVERY 8 HOURS SCHEDULED
Status: DISCONTINUED | OUTPATIENT
Start: 2023-08-02 | End: 2023-08-02 | Stop reason: HOSPADM

## 2023-08-02 RX ORDER — PROPOFOL 10 MG/ML
VIAL (ML) INTRAVENOUS AS NEEDED
Status: DISCONTINUED | OUTPATIENT
Start: 2023-08-02 | End: 2023-08-02 | Stop reason: SURG

## 2023-08-02 RX ORDER — SODIUM CHLORIDE 0.9 % (FLUSH) 0.9 %
3-10 SYRINGE (ML) INJECTION AS NEEDED
Status: DISCONTINUED | OUTPATIENT
Start: 2023-08-02 | End: 2023-08-02 | Stop reason: HOSPADM

## 2023-08-02 RX ORDER — ONDANSETRON 2 MG/ML
INJECTION INTRAMUSCULAR; INTRAVENOUS AS NEEDED
Status: DISCONTINUED | OUTPATIENT
Start: 2023-08-02 | End: 2023-08-02 | Stop reason: SURG

## 2023-08-02 RX ORDER — EPHEDRINE SULFATE 50 MG/ML
INJECTION INTRAVENOUS AS NEEDED
Status: DISCONTINUED | OUTPATIENT
Start: 2023-08-02 | End: 2023-08-02 | Stop reason: SURG

## 2023-08-02 RX ORDER — ONDANSETRON 2 MG/ML
4 INJECTION INTRAMUSCULAR; INTRAVENOUS ONCE AS NEEDED
Status: DISCONTINUED | OUTPATIENT
Start: 2023-08-02 | End: 2023-08-02 | Stop reason: SDUPTHER

## 2023-08-02 RX ORDER — LIDOCAINE HYDROCHLORIDE 10 MG/ML
0.5 INJECTION, SOLUTION EPIDURAL; INFILTRATION; INTRACAUDAL; PERINEURAL ONCE AS NEEDED
Status: COMPLETED | OUTPATIENT
Start: 2023-08-02 | End: 2023-08-02

## 2023-08-02 RX ORDER — DROPERIDOL 2.5 MG/ML
0.62 INJECTION, SOLUTION INTRAMUSCULAR; INTRAVENOUS ONCE AS NEEDED
Status: DISCONTINUED | OUTPATIENT
Start: 2023-08-02 | End: 2023-08-02 | Stop reason: HOSPADM

## 2023-08-02 RX ORDER — IPRATROPIUM BROMIDE AND ALBUTEROL SULFATE 2.5; .5 MG/3ML; MG/3ML
3 SOLUTION RESPIRATORY (INHALATION) ONCE AS NEEDED
Status: DISCONTINUED | OUTPATIENT
Start: 2023-08-02 | End: 2023-08-02 | Stop reason: HOSPADM

## 2023-08-02 RX ORDER — MIDAZOLAM HYDROCHLORIDE 1 MG/ML
0.5 INJECTION INTRAMUSCULAR; INTRAVENOUS
Status: DISCONTINUED | OUTPATIENT
Start: 2023-08-02 | End: 2023-08-02 | Stop reason: HOSPADM

## 2023-08-02 RX ORDER — SODIUM CHLORIDE, SODIUM LACTATE, POTASSIUM CHLORIDE, CALCIUM CHLORIDE 600; 310; 30; 20 MG/100ML; MG/100ML; MG/100ML; MG/100ML
9 INJECTION, SOLUTION INTRAVENOUS CONTINUOUS
Status: DISCONTINUED | OUTPATIENT
Start: 2023-08-02 | End: 2023-08-02 | Stop reason: HOSPADM

## 2023-08-02 RX ORDER — ROCURONIUM BROMIDE 10 MG/ML
INJECTION, SOLUTION INTRAVENOUS AS NEEDED
Status: DISCONTINUED | OUTPATIENT
Start: 2023-08-02 | End: 2023-08-02 | Stop reason: SURG

## 2023-08-02 RX ORDER — LIDOCAINE HYDROCHLORIDE 10 MG/ML
INJECTION, SOLUTION EPIDURAL; INFILTRATION; INTRACAUDAL; PERINEURAL AS NEEDED
Status: DISCONTINUED | OUTPATIENT
Start: 2023-08-02 | End: 2023-08-02 | Stop reason: SURG

## 2023-08-02 RX ORDER — ONDANSETRON 2 MG/ML
4 INJECTION INTRAMUSCULAR; INTRAVENOUS ONCE AS NEEDED
Status: DISCONTINUED | OUTPATIENT
Start: 2023-08-02 | End: 2023-08-02 | Stop reason: HOSPADM

## 2023-08-02 RX ORDER — MAGNESIUM HYDROXIDE 1200 MG/15ML
LIQUID ORAL AS NEEDED
Status: DISCONTINUED | OUTPATIENT
Start: 2023-08-02 | End: 2023-08-02 | Stop reason: HOSPADM

## 2023-08-02 RX ORDER — CLINDAMYCIN PHOSPHATE 600 MG/50ML
600 INJECTION INTRAVENOUS ONCE
Status: COMPLETED | OUTPATIENT
Start: 2023-08-02 | End: 2023-08-02

## 2023-08-02 RX ORDER — ESMOLOL HYDROCHLORIDE 10 MG/ML
INJECTION INTRAVENOUS AS NEEDED
Status: DISCONTINUED | OUTPATIENT
Start: 2023-08-02 | End: 2023-08-02 | Stop reason: SURG

## 2023-08-02 RX ORDER — NALOXONE HCL 0.4 MG/ML
0.4 VIAL (ML) INJECTION AS NEEDED
Status: DISCONTINUED | OUTPATIENT
Start: 2023-08-02 | End: 2023-08-02 | Stop reason: HOSPADM

## 2023-08-02 RX ORDER — FENTANYL CITRATE 50 UG/ML
50 INJECTION, SOLUTION INTRAMUSCULAR; INTRAVENOUS
Status: DISCONTINUED | OUTPATIENT
Start: 2023-08-02 | End: 2023-08-02 | Stop reason: HOSPADM

## 2023-08-02 RX ORDER — HYDROCODONE BITARTRATE AND ACETAMINOPHEN 7.5; 325 MG/1; MG/1
1 TABLET ORAL ONCE AS NEEDED
Status: DISCONTINUED | OUTPATIENT
Start: 2023-08-02 | End: 2023-08-02 | Stop reason: HOSPADM

## 2023-08-02 RX ORDER — FLUTICASONE PROPIONATE 50 MCG
1 SPRAY, SUSPENSION (ML) NASAL DAILY
Qty: 16 G | Refills: 1 | Status: SHIPPED | OUTPATIENT
Start: 2023-08-02

## 2023-08-02 RX ORDER — SODIUM CHLORIDE 9 MG/ML
40 INJECTION, SOLUTION INTRAVENOUS AS NEEDED
Status: DISCONTINUED | OUTPATIENT
Start: 2023-08-02 | End: 2023-08-02 | Stop reason: HOSPADM

## 2023-08-02 RX ORDER — PROMETHAZINE HYDROCHLORIDE 25 MG/1
25 TABLET ORAL ONCE AS NEEDED
Status: DISCONTINUED | OUTPATIENT
Start: 2023-08-02 | End: 2023-08-02 | Stop reason: HOSPADM

## 2023-08-02 RX ORDER — FENTANYL CITRATE 50 UG/ML
INJECTION, SOLUTION INTRAMUSCULAR; INTRAVENOUS AS NEEDED
Status: DISCONTINUED | OUTPATIENT
Start: 2023-08-02 | End: 2023-08-02 | Stop reason: SURG

## 2023-08-02 RX ORDER — FAMOTIDINE 20 MG/1
20 TABLET, FILM COATED ORAL ONCE
Status: COMPLETED | OUTPATIENT
Start: 2023-08-02 | End: 2023-08-02

## 2023-08-02 RX ORDER — DROPERIDOL 2.5 MG/ML
0.62 INJECTION, SOLUTION INTRAMUSCULAR; INTRAVENOUS
Status: DISCONTINUED | OUTPATIENT
Start: 2023-08-02 | End: 2023-08-02 | Stop reason: HOSPADM

## 2023-08-02 RX ORDER — HYDROCODONE BITARTRATE AND ACETAMINOPHEN 5; 325 MG/1; MG/1
1 TABLET ORAL ONCE AS NEEDED
Status: DISCONTINUED | OUTPATIENT
Start: 2023-08-02 | End: 2023-08-02 | Stop reason: SDUPTHER

## 2023-08-02 RX ORDER — MEPERIDINE HYDROCHLORIDE 25 MG/ML
12.5 INJECTION INTRAMUSCULAR; INTRAVENOUS; SUBCUTANEOUS
Status: DISCONTINUED | OUTPATIENT
Start: 2023-08-02 | End: 2023-08-02 | Stop reason: HOSPADM

## 2023-08-02 RX ORDER — DEXAMETHASONE SODIUM PHOSPHATE 10 MG/ML
INJECTION, SOLUTION INTRAMUSCULAR; INTRAVENOUS AS NEEDED
Status: DISCONTINUED | OUTPATIENT
Start: 2023-08-02 | End: 2023-08-02 | Stop reason: SURG

## 2023-08-02 RX ORDER — PROMETHAZINE HYDROCHLORIDE 25 MG/1
25 SUPPOSITORY RECTAL ONCE AS NEEDED
Status: DISCONTINUED | OUTPATIENT
Start: 2023-08-02 | End: 2023-08-02 | Stop reason: HOSPADM

## 2023-08-02 RX ORDER — SODIUM CHLORIDE 0.9 % (FLUSH) 0.9 %
3 SYRINGE (ML) INJECTION EVERY 12 HOURS SCHEDULED
Status: DISCONTINUED | OUTPATIENT
Start: 2023-08-02 | End: 2023-08-02 | Stop reason: HOSPADM

## 2023-08-02 RX ADMIN — SUGAMMADEX 200 MG: 100 INJECTION, SOLUTION INTRAVENOUS at 15:21

## 2023-08-02 RX ADMIN — FAMOTIDINE 20 MG: 20 TABLET ORAL at 13:19

## 2023-08-02 RX ADMIN — EPHEDRINE SULFATE 10 MG: 50 INJECTION INTRAVENOUS at 15:18

## 2023-08-02 RX ADMIN — GENTAMICIN SULFATE 350 MG: 40 INJECTION, SOLUTION INTRAMUSCULAR; INTRAVENOUS at 14:46

## 2023-08-02 RX ADMIN — EPHEDRINE SULFATE 10 MG: 50 INJECTION INTRAVENOUS at 14:49

## 2023-08-02 RX ADMIN — ROCURONIUM BROMIDE 40 MG: 10 SOLUTION INTRAVENOUS at 14:33

## 2023-08-02 RX ADMIN — DROPERIDOL 0.62 MG: 2.5 INJECTION, SOLUTION INTRAMUSCULAR; INTRAVENOUS at 15:48

## 2023-08-02 RX ADMIN — ESMOLOL HYDROCHLORIDE 20 MG: 10 INJECTION, SOLUTION INTRAVENOUS at 14:43

## 2023-08-02 RX ADMIN — EPHEDRINE SULFATE 5 MG: 50 INJECTION INTRAVENOUS at 15:13

## 2023-08-02 RX ADMIN — DIPHENHYDRAMINE HYDROCHLORIDE 25 MG: 50 INJECTION, SOLUTION INTRAMUSCULAR; INTRAVENOUS at 14:46

## 2023-08-02 RX ADMIN — SODIUM CHLORIDE, POTASSIUM CHLORIDE, SODIUM LACTATE AND CALCIUM CHLORIDE 9 ML/HR: 600; 310; 30; 20 INJECTION, SOLUTION INTRAVENOUS at 13:19

## 2023-08-02 RX ADMIN — DEXAMETHASONE SODIUM PHOSPHATE 10 MG: 10 INJECTION, SOLUTION INTRAMUSCULAR; INTRAVENOUS at 14:46

## 2023-08-02 RX ADMIN — FENTANYL CITRATE 50 MCG: 50 INJECTION, SOLUTION INTRAMUSCULAR; INTRAVENOUS at 14:33

## 2023-08-02 RX ADMIN — PROPOFOL 100 MG: 10 INJECTION, EMULSION INTRAVENOUS at 14:33

## 2023-08-02 RX ADMIN — ESMOLOL HYDROCHLORIDE 30 MG: 10 INJECTION, SOLUTION INTRAVENOUS at 14:38

## 2023-08-02 RX ADMIN — LIDOCAINE HYDROCHLORIDE 50 MG: 10 INJECTION, SOLUTION EPIDURAL; INFILTRATION; INTRACAUDAL; PERINEURAL at 14:33

## 2023-08-02 RX ADMIN — HEPARIN SODIUM 5000 UNITS: 5000 INJECTION INTRAVENOUS; SUBCUTANEOUS at 13:39

## 2023-08-02 RX ADMIN — ONDANSETRON 4 MG: 2 INJECTION INTRAMUSCULAR; INTRAVENOUS at 15:20

## 2023-08-02 RX ADMIN — PROPOFOL 25 MCG/KG/MIN: 10 INJECTION, EMULSION INTRAVENOUS at 14:43

## 2023-08-02 RX ADMIN — FENTANYL CITRATE 50 MCG: 50 INJECTION, SOLUTION INTRAMUSCULAR; INTRAVENOUS at 14:40

## 2023-08-02 RX ADMIN — CLINDAMYCIN PHOSPHATE 600 MG: 600 INJECTION, SOLUTION INTRAVENOUS at 14:46

## 2023-08-02 RX ADMIN — LIDOCAINE HYDROCHLORIDE 0.5 ML: 10 INJECTION, SOLUTION EPIDURAL; INFILTRATION; INTRACAUDAL; PERINEURAL at 13:19

## 2023-08-02 NOTE — OP NOTE
GYN Operative Note    Patient Name, age and sex:  Raquel Mariee, 67 y.o., female  Procedure Date:  8/2/2023    Surgeon(s) and Role:     * Kellen Galan MD - Primary    Additional Staff:    Nydia Houston Medically necessary for assistance.Complex retraction. Suturing skills.Complex and critical patient postioning.  Laparoscopic instrument use and manipulation.     * No Diagnosis Codes entered *    PREOP DX: rectocele    POSTOP DX : same    * No Diagnosis Codes entered *    Procedure(s):  POSTERIOR  COLPORRHAPHY    Indications for Operation: 67-year-old female with prior hysterectomy who has third-degree midline rectocele very bothersome desires surgical repair.    Antibiotics:  clindamycin (Cleocin) ordered on call to OR,Gentamycin    Anesthesia:  Type: General -   ASA:  III    Operative Findings: The uterus is absent.  The apex of the vagina is well supported.  There is no cystocele anteriorly.  3rddegree midline rectocele is noted.  The patient does have good perineal support and did not need perineorrhaphy.    Specimens Removed:  None    Estimated Blood Loss: Minimal mL    Urine Output: 200 mL    Complications: None    Procedure Narrative: Patient was taken the operating room where general anesthesia was found to be adequate.  She is then prepped and draped normal sterile fashion dorsolithotomy position in the yellowfin stirrups.  Care was taken to make sure no extra flexion of the knees due to prior knee replacement surgery.  Then the Rivera catheter was placed and the degree of prolapse was assessed.  The only prolapse significant enough for surgery was the rectocele posterior vaginal wall.  The rectocele was grasped with Allis clamps injected with a local injectable saline solution.  The #15 blade scalpel was then used to incise the vaginal mucosa along the midline and the rectocele was dissected off superiorly and inferiorly along its full length.  Then the rectovaginal fascia was dissected  off and each right and left side of this.  This was then reapproximated the midline with interrupted 0 Vicryl for sutures.  The excess vaginal mucosa was trimmed and some Floseal was placed to help with any oozing and bleeding.  Then the vaginal mucosa was closed with a 3-0 Vicryl running locking suture.  Rectal exam was performed to make sure no defects or sutures in the rectum and was intact with no fistula.  Complete reduction of the rectocele was noted as well.  Then copious irrigation was then performed hemostasis was confirmed the Rivera catheter was removed the patient was cleansed from all prep she was taken out of lithotomy position awoken from general anesthesia and taken to recovery room stable condition.    Kellen Galan MD - 8/2/2023, 15:27 EDT

## 2023-08-08 ENCOUNTER — TELEPHONE (OUTPATIENT)
Dept: PULMONOLOGY | Facility: CLINIC | Age: 68
End: 2023-08-08

## 2023-08-08 NOTE — TELEPHONE ENCOUNTER
Caller: Raquel Mariee    Relationship to patient: Self    Best call back number: [phone]     Patient is needing: WANTING TO KNOW IF WE HERD BACK ABOUT GETTING HELP PAYING FOR HER INHALER. ALSO WANTED TO KNOW IF WE HAVE ANYMORE SAMPLES OF I T

## 2023-08-10 ENCOUNTER — HOSPITAL ENCOUNTER (OUTPATIENT)
Dept: INFUSION THERAPY | Facility: HOSPITAL | Age: 68
Discharge: HOME | End: 2023-08-10
Payer: MEDICARE

## 2023-08-10 VITALS
TEMPERATURE: 98.2 F | HEART RATE: 85 BPM | RESPIRATION RATE: 18 BRPM | OXYGEN SATURATION: 96 % | DIASTOLIC BLOOD PRESSURE: 96 MMHG | SYSTOLIC BLOOD PRESSURE: 154 MMHG

## 2023-08-10 DIAGNOSIS — D80.1 COMMON VARIABLE AGAMMAGLOBULINEMIA: Primary | ICD-10-CM

## 2023-08-10 DIAGNOSIS — Z95.828 PORT-A-CATH IN PLACE: ICD-10-CM

## 2023-08-10 PROCEDURE — 96365 THER/PROPH/DIAG IV INF INIT: CPT

## 2023-08-10 PROCEDURE — 96361 HYDRATE IV INFUSION ADD-ON: CPT

## 2023-08-10 PROCEDURE — 96366 THER/PROPH/DIAG IV INF ADDON: CPT

## 2023-08-10 PROCEDURE — 25010000002 HEPARIN LOCK FLUSH PER 10 UNITS: Performed by: ALLERGY & IMMUNOLOGY

## 2023-08-10 PROCEDURE — 25010000002 IMMUNE GLOBULIN (HUMAN) 20 GM/200ML SOLUTION: Performed by: ALLERGY & IMMUNOLOGY

## 2023-08-10 RX ORDER — SODIUM CHLORIDE 0.9 % (FLUSH) 0.9 %
10 SYRINGE (ML) INJECTION AS NEEDED
OUTPATIENT
Start: 2023-08-10

## 2023-08-10 RX ORDER — DIPHENHYDRAMINE HCL 25 MG
50 CAPSULE ORAL ONCE
Status: DISCONTINUED | OUTPATIENT
Start: 2023-08-10 | End: 2023-08-12 | Stop reason: HOSPADM

## 2023-08-10 RX ORDER — SODIUM CHLORIDE 0.9 % (FLUSH) 0.9 %
10 SYRINGE (ML) INJECTION AS NEEDED
Status: DISCONTINUED | OUTPATIENT
Start: 2023-08-10 | End: 2023-08-12 | Stop reason: HOSPADM

## 2023-08-10 RX ORDER — PREDNISONE 20 MG/1
40 TABLET ORAL ONCE
Status: DISCONTINUED | OUTPATIENT
Start: 2023-08-10 | End: 2023-08-12 | Stop reason: HOSPADM

## 2023-08-10 RX ORDER — PREDNISONE 20 MG/1
40 TABLET ORAL ONCE AS NEEDED
Start: 2023-09-07

## 2023-08-10 RX ORDER — DIPHENHYDRAMINE HCL 25 MG
50 CAPSULE ORAL ONCE AS NEEDED
Start: 2023-09-07

## 2023-08-10 RX ORDER — ACETAMINOPHEN 325 MG/1
325 TABLET ORAL ONCE
OUTPATIENT
Start: 2023-09-07

## 2023-08-10 RX ORDER — PREDNISONE 20 MG/1
40 TABLET ORAL ONCE
Start: 2023-09-07 | End: 2023-09-07

## 2023-08-10 RX ORDER — DIPHENHYDRAMINE HCL 25 MG
50 CAPSULE ORAL ONCE
Start: 2023-09-07 | End: 2023-09-07

## 2023-08-10 RX ORDER — EPINEPHRINE 1 MG/ML
0.3 INJECTION, SOLUTION INTRAMUSCULAR; SUBCUTANEOUS ONCE AS NEEDED
Start: 2023-09-07

## 2023-08-10 RX ORDER — METHYLPREDNISOLONE SODIUM SUCCINATE 125 MG/2ML
50 INJECTION, POWDER, LYOPHILIZED, FOR SOLUTION INTRAMUSCULAR; INTRAVENOUS ONCE AS NEEDED
Start: 2023-09-07

## 2023-08-10 RX ORDER — ACETAMINOPHEN 325 MG/1
325 TABLET ORAL ONCE
Status: DISCONTINUED | OUTPATIENT
Start: 2023-08-10 | End: 2023-08-12 | Stop reason: HOSPADM

## 2023-08-10 RX ORDER — HEPARIN SODIUM (PORCINE) LOCK FLUSH IV SOLN 100 UNIT/ML 100 UNIT/ML
500 SOLUTION INTRAVENOUS AS NEEDED
Status: DISCONTINUED | OUTPATIENT
Start: 2023-08-10 | End: 2023-08-12 | Stop reason: HOSPADM

## 2023-08-10 RX ORDER — HEPARIN SODIUM (PORCINE) LOCK FLUSH IV SOLN 100 UNIT/ML 100 UNIT/ML
500 SOLUTION INTRAVENOUS AS NEEDED
OUTPATIENT
Start: 2023-08-10

## 2023-08-10 RX ADMIN — IMMUNE GLOBULIN INFUSION (HUMAN) 20 G: 100 INJECTION, SOLUTION INTRAVENOUS; SUBCUTANEOUS at 11:12

## 2023-08-10 RX ADMIN — HEPARIN 500 UNITS: 100 SYRINGE at 12:45

## 2023-08-10 RX ADMIN — Medication 10 ML: at 12:46

## 2023-08-10 RX ADMIN — SODIUM CHLORIDE 500 ML: 9 INJECTION, SOLUTION INTRAVENOUS at 08:54

## 2023-08-10 RX ADMIN — IMMUNE GLOBULIN INFUSION (HUMAN) 20 G: 100 INJECTION, SOLUTION INTRAVENOUS; SUBCUTANEOUS at 09:26

## 2023-08-21 ENCOUNTER — TELEPHONE (OUTPATIENT)
Dept: PULMONOLOGY | Facility: CLINIC | Age: 68
End: 2023-08-21

## 2023-08-21 NOTE — TELEPHONE ENCOUNTER
Caller: Raquel Mariee    Relationship: Self    Best call back number: [phone]    What is the best time to reach you: SHE'S GOING TO BE HOME LADI.     Who are you requesting to speak with (clinical staff, provider,  specific staff member): KRISTIE     Do you know the name of the person who called: KRISTIE     What was the call regarding: SHE SAID SHE WAS REACHING OUT TO HER, SHE PUT IN AN APPLICATION FOR FINANCIAL ASSISTANCE AND SHE WAS WONDERING IF IT WAS IN RESPONSE TO THAT.

## 2023-08-23 DIAGNOSIS — J45.50 SEVERE PERSISTENT ASTHMA WITHOUT COMPLICATION: Primary | ICD-10-CM

## 2023-08-23 DIAGNOSIS — Z01.811 PREOPERATIVE RESPIRATORY EXAMINATION: ICD-10-CM

## 2023-08-23 RX ORDER — BUDESONIDE, GLYCOPYRROLATE, AND FORMOTEROL FUMARATE 160; 9; 4.8 UG/1; UG/1; UG/1
AEROSOL, METERED RESPIRATORY (INHALATION)
Qty: 10.7 G | Refills: 11 | Status: SHIPPED | OUTPATIENT
Start: 2023-08-23

## 2023-08-23 RX ORDER — DOXYCYCLINE HYCLATE 100 MG/1
100 CAPSULE ORAL 2 TIMES DAILY
Qty: 20 CAPSULE | Refills: 0 | Status: SHIPPED | OUTPATIENT
Start: 2023-08-23

## 2023-08-24 ENCOUNTER — EXTERNAL PBMM DATA (OUTPATIENT)
Dept: PHARMACY | Facility: OTHER | Age: 68
End: 2023-08-24
Payer: MEDICARE

## 2023-08-24 DIAGNOSIS — E11.9 TYPE 2 DIABETES MELLITUS WITHOUT COMPLICATION, WITH LONG-TERM CURRENT USE OF INSULIN: ICD-10-CM

## 2023-08-24 DIAGNOSIS — Z79.4 TYPE 2 DIABETES MELLITUS WITHOUT COMPLICATION, WITH LONG-TERM CURRENT USE OF INSULIN: ICD-10-CM

## 2023-08-24 RX ORDER — OMEPRAZOLE 40 MG/1
40 CAPSULE, DELAYED RELEASE ORAL 2 TIMES DAILY
Qty: 180 CAPSULE | Refills: 3 | Status: SHIPPED | OUTPATIENT
Start: 2023-08-24

## 2023-08-29 RX ORDER — FLUCONAZOLE 100 MG/1
100 TABLET ORAL DAILY
Qty: 1 TABLET | Refills: 0 | Status: SHIPPED | OUTPATIENT
Start: 2023-08-29

## 2023-09-05 DIAGNOSIS — J32.0 MAXILLARY SINUSITIS, UNSPECIFIED CHRONICITY: ICD-10-CM

## 2023-09-05 DIAGNOSIS — E11.9 TYPE 2 DIABETES MELLITUS WITHOUT COMPLICATION, WITH LONG-TERM CURRENT USE OF INSULIN: ICD-10-CM

## 2023-09-05 DIAGNOSIS — Z79.4 TYPE 2 DIABETES MELLITUS WITHOUT COMPLICATION, WITH LONG-TERM CURRENT USE OF INSULIN: ICD-10-CM

## 2023-09-13 ENCOUNTER — OFFICE VISIT (OUTPATIENT)
Dept: CARDIOLOGY | Facility: CLINIC | Age: 68
End: 2023-09-13
Payer: MEDICARE

## 2023-09-13 VITALS
WEIGHT: 210 LBS | RESPIRATION RATE: 18 BRPM | DIASTOLIC BLOOD PRESSURE: 68 MMHG | OXYGEN SATURATION: 97 % | HEART RATE: 77 BPM | BODY MASS INDEX: 35.85 KG/M2 | HEIGHT: 64 IN | SYSTOLIC BLOOD PRESSURE: 120 MMHG

## 2023-09-13 DIAGNOSIS — I50.32 CHRONIC DIASTOLIC HEART FAILURE: ICD-10-CM

## 2023-09-13 DIAGNOSIS — I35.0 AORTIC STENOSIS, MODERATE: Primary | ICD-10-CM

## 2023-09-13 DIAGNOSIS — N18.30 STAGE 3 CHRONIC KIDNEY DISEASE, UNSPECIFIED WHETHER STAGE 3A OR 3B CKD: ICD-10-CM

## 2023-09-13 DIAGNOSIS — I20.1 PRINZMETAL ANGINA: ICD-10-CM

## 2023-09-13 PROCEDURE — 3078F DIAST BP <80 MM HG: CPT | Performed by: INTERNAL MEDICINE

## 2023-09-13 PROCEDURE — 99214 OFFICE O/P EST MOD 30 MIN: CPT | Performed by: INTERNAL MEDICINE

## 2023-09-13 PROCEDURE — 3074F SYST BP LT 130 MM HG: CPT | Performed by: INTERNAL MEDICINE

## 2023-09-13 RX ORDER — ISOSORBIDE MONONITRATE 30 MG/1
30 TABLET, EXTENDED RELEASE ORAL EVERY MORNING
Qty: 90 TABLET | Refills: 3 | Status: SHIPPED | OUTPATIENT
Start: 2023-09-13

## 2023-09-13 NOTE — PROGRESS NOTES
[facility] Office Follow Up Note    Raquel Mariee  [id number]  2023    Primary Care Provider: Sanjeev Rawls MD    Chief Complaint: Routine follow-up    History of Present Illness:   Mrs. Raquel Mariee is a 67y.o. female who presents to the Cardiology Clinic for routine follow-up.   The patient has a past medical history significant for common variable immunodeficiency on chronic IVIG infusions, stage III chronic kidney disease, hypothyroidism, anemia, type 2 diabetes mellitus, and hypertension.  She has a past cardiac history significant for chronic diastolic heart failure, Prinzmetal's angina maintained on isosorbide, and water aortic stenosis.  She presents today for regular follow-up.  Since her last appointment, the patient reports she has been doing well without significant changes in her health.  She remains active, without exertional dyspnea.  No significant exertional chest pain or angina.  No episodes of palpitations.  She describes any episodes of dizziness, lightheadedness, or presyncopal events.  No specific complaints today.     Past Cardiac Testin. Last Coronary Angio: None  2. Prior Stress Testing: Unknown  3. Last Echo: 2022              1.  Normal left ventricular size and systolic function, LVEF 65-70%.              2.  Mild concentric LVH.              3.  Indeterminate LV diastolic filling pattern.              4.  Normal right ventricular size and systolic function.              5.  Mildly increased left atrial volume index.              6.  Mild to moderate aortic stenosis (V-max 307 cm/s, mean gradient 17 mmHg, max 38 mmHg, DOMENIC 1.86 cm²).              7.  Trace MR and TR.  4. Prior Holter Monitor: None    Review of Systems:   Review of Systems   Constitutional:  Negative for activity change, appetite change, chills, diaphoresis, fatigue, fever, unexpected weight gain and unexpected weight loss.   Eyes:  Negative for blurred  vision and double vision.   Respiratory:  Negative for cough, chest tightness, shortness of breath and wheezing.    Cardiovascular:  Negative for chest pain, palpitations and leg swelling.   Gastrointestinal:  Negative for abdominal pain, anal bleeding, blood in stool and GERD.   Endocrine: Negative for cold intolerance and heat intolerance.   Genitourinary:  Negative for hematuria.   Neurological:  Negative for dizziness, syncope, weakness and light-headedness.   Hematological:  Does not bruise/bleed easily.   Psychiatric/Behavioral:  Negative for depressed mood and stress. The patient is not nervous/anxious.      I have reviewed and/or updated the patient's past medical, past surgical, family, social history, problem list and allergies as appropriate.     Medications:     Current Outpatient Medications:     isosorbide mononitrate (IMDUR) 30 MG 24 hr tablet, Take 1 tablet by mouth Every Morning., Disp: 90 tablet, Rfl: 3    acetaminophen-codeine (TYLENOL #3) 300-30 MG per tablet, Take 1 tablet by mouth Every 4 (Four) Hours As Needed for Moderate Pain., Disp: 10 tablet, Rfl: 0    albuterol sulfate  (90 Base) MCG/ACT inhaler, Inhale 2 puffs by mouth every 4 to 6 hours as needed for cough, wheeze, shortness of breath. Use with vortex spacer, Disp: 8.5 g, Rfl: 3    allopurinol (ZYLOPRIM) 100 MG tablet, Take 1 tablet by mouth Daily., Disp: 90 tablet, Rfl: 3    benzonatate (TESSALON) 200 MG capsule, Take 1 capsule by mouth 3 (Three) Times a Day As Needed for Cough., Disp: 21 capsule, Rfl: 0    betamethasone valerate (VALISONE) 0.1 % cream, Apply topically to the appropriate area as directed Daily., Disp: 45 g, Rfl: 11    Budeson-Glycopyrrol-Formoterol (Breztri Aerosphere) 160-9-4.8 MCG/ACT aerosol inhaler, Inhale 2 puffs by mouth twice daily. Rinse mouth after use, Disp: 10.7 g, Rfl: 11    buPROPion SR (WELLBUTRIN SR) 150 MG 12 hr tablet, Take 1 tablet by mouth 2 (Two) Times a Day., Disp: 180 tablet, Rfl: 3     calcium carbonate (Antacid Maximum) 1000 MG chewable tablet, Chew 500 mg 3 (Three) Times a Day., Disp: , Rfl:     cholecalciferol (VITAMIN D3) 25 MCG (1000 UT) tablet, Take 1 tablet by mouth Daily., Disp: , Rfl:     clonazePAM (KlonoPIN) 0.5 MG tablet, Take 1 tablet by mouth At Night As Needed, Disp: 30 tablet, Rfl: 2    Dextromethorphan-guaiFENesin (MUCINEX DM PO), Take 2 tablets by mouth 2 (Two) Times a Day., Disp: , Rfl:     dicyclomine (BENTYL) 10 MG capsule, Take 1 capsule by mouth 3 (Three) Times a Day., Disp: 270 capsule, Rfl: 3    diphenhydrAMINE (Benadryl Allergy) 25 MG tablet, Take 1-2 tablets by mouth Every 4-6 Hours As Needed., Disp: 90 tablet, Rfl: 11    doxycycline (VIBRAMYCIN) 100 MG capsule, Take 1 capsule by mouth 2 (Two) Times a Day., Disp: 14 capsule, Rfl: 0    doxycycline (VIBRAMYCIN) 100 MG capsule, Take 1 capsule by mouth 2 (Two) Times a Day., Disp: 20 capsule, Rfl: 0    doxycycline (VIBRAMYCIN) 100 MG capsule, Take 1 capsule by mouth 2 (Two) Times a Day., Disp: 20 capsule, Rfl: 0    estradiol (ESTRACE) 0.1 MG/GM vaginal cream, Insert 0.5 grams vaginally twice weekly, Disp: 42.5 g, Rfl: 5    ferrous sulfate 325 (65 FE) MG EC tablet, Take 1 tablet by mouth Daily With Breakfast., Disp: , Rfl:     fexofenadine (ALLEGRA) 180 MG tablet, Take 1 tablet by mouth Daily., Disp: , Rfl:     fluconazole (Diflucan) 100 MG tablet, Take 1 tablet by mouth Daily., Disp: 1 tablet, Rfl: 0    fluticasone (FLONASE) 50 MCG/ACT nasal spray, Instill 1 spray into each nostril as directed by provider Daily., Disp: 16 g, Rfl: 1    furosemide (LASIX) 40 MG tablet, Take 1 tablet by mouth Daily, twice a month, Disp: 30 tablet, Rfl: 1    glucosamine-chondroitin 500-400 MG capsule capsule, Take 1 capsule by mouth 2 (Two) Times a Day With Meals., Disp: , Rfl:     Hypertonic Nasal Wash (Sinus Rinse Kit) pack, use once or twice daily as needed, Disp: 1 each, Rfl: 3    immune globulin, human, (Gammagard) 20 GM/200ML solution  infusion, Infuse 40 g into a venous catheter Every 28 (Twenty-Eight) Days., Disp: 400 mL, Rfl: 0    ipratropium (ATROVENT) 0.06 % nasal spray, Instill 1-2 sprays each nostril 2-3 times daily as needed, Disp: 15 mL, Rfl: 3    ipratropium-albuterol (DUO-NEB) 0.5-2.5 mg/3 ml nebulizer, Inhale the contents of 1 vial into nebulizer every 4-6 hours as needed for cough,wheezing and shortness of breath., Disp: 90 mL, Rfl: 3    levothyroxine (SYNTHROID, LEVOTHROID) 50 MCG tablet, Take 1 tablet by mouth Daily., Disp: 90 tablet, Rfl: 3    Magnesium 250 MG tablet, Take 2 tablets by mouth 2 (Two) Times a Day., Disp: , Rfl:     montelukast (SINGULAIR) 10 MG tablet, Take 1 tablet by mouth every night at bedtime., Disp: 90 tablet, Rfl: 3    montelukast (SINGULAIR) 10 MG tablet, Take 1 tablet by mouth every night at bedtime., Disp: 30 tablet, Rfl: 11    multivitamin with minerals tablet tablet, Take 1 tablet by mouth Daily., Disp: , Rfl:     omeprazole (priLOSEC) 40 MG capsule, Take 1 capsule by mouth 2 (Two) Times a Day., Disp: 180 capsule, Rfl: 3    ondansetron (ZOFRAN) 4 MG tablet, Take 1 tablet by mouth Every 8 (Eight) Hours As Needed for Nausea or Vomiting., Disp: 30 tablet, Rfl: 11    POTASSIUM CHLORIDE PO, Take 20 mEq by mouth See Admin Instructions. Takes two times a month with Lasix, Disp: , Rfl:     potassium citrate (UROCIT-K) 10 MEQ (1080 MG) CR tablet, Take 1 tablet by mouth Daily., Disp: 90 tablet, Rfl: 3    predniSONE (DELTASONE) 20 MG tablet, Take 2 tablets prior to Gammgard infusion, Disp: 6 tablet, Rfl: 3    predniSONE (DELTASONE) 20 MG tablet, Take 2 tablets by mouth prior to Gammgard infusion, Disp: 6 tablet, Rfl: 3    Spacer/Aero-Holding Chambers (AeroChamber Plus Wali-Vu) misc, Use as directed with albuterol inhaler, Disp: 1 each, Rfl: 0    tiZANidine (ZANAFLEX) 4 MG tablet, Take 1 tablet by mouth Every Night., Disp: 90 tablet, Rfl: 1    ubrogepant (Ubrelvy) 100 MG tablet, Take 1 tablet by mouth 1 (One) Time As  "Needed (migraine)., Disp: 6 tablet, Rfl: 0    valsartan (DIOVAN) 160 MG tablet, Take 1 tablet by mouth Daily., Disp: 90 tablet, Rfl: 3    vitamin B-12 (CYANOCOBALAMIN) 1000 MCG tablet, Take 1 tablet by mouth Daily., Disp: , Rfl:     Current Facility-Administered Medications:     heparin injection 500 Units, 5 mL, Intravenous, PRN, Sanjeev Rawls MD    sodium chloride 0.9 % flush 10 mL, 10 mL, Intravenous, Q12H, Sanjeev Rawls MD    sodium chloride 0.9 % flush 10 mL, 10 mL, Intravenous, PRN, Sanjeev Rawls MD    sodium chloride 0.9 % flush 20 mL, 20 mL, Intravenous, PRNSinai Michael A, MD    Physical Exam:  Vital Signs:   Vitals:    09/13/23 0858   BP: 120/68   BP Location: Left arm   Patient Position: Sitting   Pulse: 77   Resp: 18   SpO2: 97%   Weight: 95.3 kg (210 lb)   Height: 162.6 cm (64\")       Physical Exam  Constitutional:       General: She is not in acute distress.     Appearance: Normal appearance. She is well-developed. She is not diaphoretic.   HENT:      Head: Normocephalic and atraumatic.   Eyes:      General: No scleral icterus.     Pupils: Pupils are equal, round, and reactive to light.   Neck:      Trachea: No tracheal deviation.   Cardiovascular:      Rate and Rhythm: Normal rate and regular rhythm.      Heart sounds: Normal heart sounds.     No friction rub. No gallop.      Comments: 4/6 systolic ejection murmur over the aortic area  Pulmonary:      Effort: Pulmonary effort is normal. No respiratory distress.      Breath sounds: Normal breath sounds. No stridor. No wheezing or rales.   Chest:      Chest wall: No tenderness.   Abdominal:      General: Bowel sounds are normal. There is no distension.      Palpations: Abdomen is soft.      Tenderness: There is no abdominal tenderness. There is no guarding or rebound.   Musculoskeletal:         General: Normal range of motion.      Cervical back: Neck supple. No tenderness.   Lymphadenopathy:      Cervical: No cervical adenopathy. "   Skin:     General: Skin is warm and dry.      Findings: No erythema.   Neurological:      Mental Status: She is alert and oriented to person, place, and time.   Psychiatric:         Behavior: Behavior normal.       Results Review:   I reviewed the patient's new clinical results.      Assessment / Plan:     1. Aortic stenosis  -- Remains asymptomatic  --Echocardiogram today shows aortic gnosis is now moderate to severe, LV systolic function remains preserved  --Repeat echocardiogram in 6 months for surveillance  --Referral for TAVR if surveillance echocardiogram shows aortic stenosis as severe or the patient develops symptomatic AS     2. Chronic diastolic heart failure  -- Remains euvolemic and well compensated today     3.  Prinzmetal angina   -- No anginal symptoms  --Continue isosorbide as antianginal     4. Common variable immunodeficiency   --Managed by immunology     5.  Chronic kidney disease, stage III  -- GFR 66 on last labs in 7/23     6.  Obesity, BMI 36 kg/m²  --Encouraged continued efforts at weight loss through diet and exercise    Follow Up:   Return in about 6 months (around 3/13/2024).      Thank you for allowing me to participate in the care of your patient. Please to not hesitate to contact me with additional questions or concerns.     SENTHIL Lovelace MD  Interventional Cardiology   09/13/2023  09:17 EDT

## 2023-09-14 ENCOUNTER — TELEPHONE (OUTPATIENT)
Dept: CARDIOLOGY | Facility: CLINIC | Age: 68
End: 2023-09-14
Payer: MEDICARE

## 2023-09-14 NOTE — TELEPHONE ENCOUNTER
LMOM FOR PT TO CALL OFFICE TO SCHEDULE APPT.  OUR OFFICE WILL SCHEDULE ECHO AND FU SAME DAY PER DR. MERRITT INSTRUCTIONS.    PLEASE WARM TRANSFER CALL TO OUR OFFICE.      THANKS-

## 2023-09-14 NOTE — TELEPHONE ENCOUNTER
"Caller: Raquel Mariee    Relationship to patient: Self    Best call back number: [phone]     Chief complaint: NEEDS 6MO/ ECHO     Type of visit: F/U    Requested date: SAME DAY AS ECHO     Additional notes:PT WANTS TO MAKE HER 6MO FOLLOW UP AND HER ECHO ON THE SAME DAY. PER PN \"Return in about 6 months (around 3/13/2024)\". WAS GOING TO MAKE THIS PT AND APPT AND SHE WANTED TO WAIT UNTIL SHE HAD A DATE FOR HER ECHO. SAW DR. MERRITT 9/13 AND TOLD HER IF THE ORDERS WERE IN THE SCHD DEPARTMENT WOULD BE REACHING OUT. OUR OFFICE TOOLS STATE PT NEEDS TO WAIT 1 WEEK AFTER ECHO FOR A FOLLOW UP. PLEASE ADVISE PT ON SCHD TIMEFRAME/ APPT DATE FOR 6MO.       CALL PT BACK BEFORE 1:45PM     "

## 2023-09-15 ENCOUNTER — HOSPITAL ENCOUNTER (OUTPATIENT)
Dept: INFUSION THERAPY | Facility: HOSPITAL | Age: 68
Discharge: HOME | End: 2023-09-15
Payer: MEDICARE

## 2023-09-15 ENCOUNTER — PATIENT ROUNDING (BHMG ONLY) (OUTPATIENT)
Dept: CARDIOLOGY | Facility: CLINIC | Age: 68
End: 2023-09-15
Payer: MEDICARE

## 2023-09-15 VITALS
RESPIRATION RATE: 18 BRPM | DIASTOLIC BLOOD PRESSURE: 75 MMHG | OXYGEN SATURATION: 97 % | TEMPERATURE: 97.9 F | SYSTOLIC BLOOD PRESSURE: 144 MMHG | HEART RATE: 72 BPM

## 2023-09-15 DIAGNOSIS — Z95.828 PORT-A-CATH IN PLACE: ICD-10-CM

## 2023-09-15 DIAGNOSIS — D80.1 COMMON VARIABLE AGAMMAGLOBULINEMIA: Primary | ICD-10-CM

## 2023-09-15 PROCEDURE — 96365 THER/PROPH/DIAG IV INF INIT: CPT

## 2023-09-15 PROCEDURE — 96361 HYDRATE IV INFUSION ADD-ON: CPT

## 2023-09-15 PROCEDURE — 25010000002 HEPARIN LOCK FLUSH PER 10 UNITS: Performed by: ALLERGY & IMMUNOLOGY

## 2023-09-15 PROCEDURE — 25010000002 IMMUNE GLOBULIN (HUMAN) 20 GM/200ML SOLUTION: Performed by: ALLERGY & IMMUNOLOGY

## 2023-09-15 PROCEDURE — 96366 THER/PROPH/DIAG IV INF ADDON: CPT

## 2023-09-15 RX ORDER — HEPARIN SODIUM (PORCINE) LOCK FLUSH IV SOLN 100 UNIT/ML 100 UNIT/ML
500 SOLUTION INTRAVENOUS AS NEEDED
Status: DISCONTINUED | OUTPATIENT
Start: 2023-09-15 | End: 2023-09-17 | Stop reason: HOSPADM

## 2023-09-15 RX ORDER — ACETAMINOPHEN 325 MG/1
325 TABLET ORAL ONCE
Status: DISCONTINUED | OUTPATIENT
Start: 2023-09-15 | End: 2023-09-17 | Stop reason: HOSPADM

## 2023-09-15 RX ORDER — PREDNISONE 20 MG/1
40 TABLET ORAL ONCE AS NEEDED
Start: 2023-10-06

## 2023-09-15 RX ORDER — DIPHENHYDRAMINE HCL 25 MG
50 CAPSULE ORAL ONCE AS NEEDED
Start: 2023-10-06

## 2023-09-15 RX ORDER — SODIUM CHLORIDE 0.9 % (FLUSH) 0.9 %
10 SYRINGE (ML) INJECTION AS NEEDED
OUTPATIENT
Start: 2023-09-15

## 2023-09-15 RX ORDER — DIPHENHYDRAMINE HCL 25 MG
50 CAPSULE ORAL ONCE
Status: DISCONTINUED | OUTPATIENT
Start: 2023-09-15 | End: 2023-09-17 | Stop reason: HOSPADM

## 2023-09-15 RX ORDER — HEPARIN SODIUM (PORCINE) LOCK FLUSH IV SOLN 100 UNIT/ML 100 UNIT/ML
500 SOLUTION INTRAVENOUS AS NEEDED
OUTPATIENT
Start: 2023-09-15

## 2023-09-15 RX ORDER — ACETAMINOPHEN 325 MG/1
325 TABLET ORAL ONCE
OUTPATIENT
Start: 2023-10-06

## 2023-09-15 RX ORDER — PREDNISONE 20 MG/1
40 TABLET ORAL ONCE
Start: 2023-10-06 | End: 2023-10-06

## 2023-09-15 RX ORDER — METHYLPREDNISOLONE SODIUM SUCCINATE 125 MG/2ML
50 INJECTION, POWDER, LYOPHILIZED, FOR SOLUTION INTRAMUSCULAR; INTRAVENOUS ONCE AS NEEDED
Start: 2023-10-06

## 2023-09-15 RX ORDER — SODIUM CHLORIDE 0.9 % (FLUSH) 0.9 %
10 SYRINGE (ML) INJECTION AS NEEDED
Status: DISCONTINUED | OUTPATIENT
Start: 2023-09-15 | End: 2023-09-17 | Stop reason: HOSPADM

## 2023-09-15 RX ORDER — PREDNISONE 20 MG/1
40 TABLET ORAL ONCE
Status: DISCONTINUED | OUTPATIENT
Start: 2023-09-15 | End: 2023-09-17 | Stop reason: HOSPADM

## 2023-09-15 RX ORDER — DIPHENHYDRAMINE HCL 25 MG
50 CAPSULE ORAL ONCE
Start: 2023-10-06 | End: 2023-10-06

## 2023-09-15 RX ORDER — EPINEPHRINE 1 MG/ML
0.3 INJECTION, SOLUTION INTRAMUSCULAR; SUBCUTANEOUS ONCE AS NEEDED
Start: 2023-10-06

## 2023-09-15 RX ADMIN — IMMUNE GLOBULIN INFUSION (HUMAN) 20 G: 100 INJECTION, SOLUTION INTRAVENOUS; SUBCUTANEOUS at 10:51

## 2023-09-15 RX ADMIN — IMMUNE GLOBULIN INFUSION (HUMAN) 20 G: 100 INJECTION, SOLUTION INTRAVENOUS; SUBCUTANEOUS at 09:05

## 2023-09-15 RX ADMIN — Medication 10 ML: at 12:30

## 2023-09-15 RX ADMIN — HEPARIN 500 UNITS: 100 SYRINGE at 12:30

## 2023-09-15 RX ADMIN — SODIUM CHLORIDE 500 ML: 9 INJECTION, SOLUTION INTRAVENOUS at 08:34

## 2023-09-15 NOTE — PROGRESS NOTES
[facility] welcome back email and rounding message has been sent to patient via [employer].

## 2023-09-19 ENCOUNTER — OFFICE VISIT (OUTPATIENT)
Dept: UROLOGY | Facility: CLINIC | Age: 68
End: 2023-09-19
Payer: MEDICARE

## 2023-09-19 ENCOUNTER — TELEPHONE (OUTPATIENT)
Dept: UROLOGY | Facility: CLINIC | Age: 68
End: 2023-09-19

## 2023-09-19 VITALS
TEMPERATURE: 98.2 F | BODY MASS INDEX: 35.85 KG/M2 | SYSTOLIC BLOOD PRESSURE: 136 MMHG | WEIGHT: 210 LBS | HEIGHT: 64 IN | DIASTOLIC BLOOD PRESSURE: 86 MMHG

## 2023-09-19 DIAGNOSIS — N39.0 URINARY TRACT INFECTION WITHOUT HEMATURIA, SITE UNSPECIFIED: Primary | ICD-10-CM

## 2023-09-19 DIAGNOSIS — N39.41 URGE INCONTINENCE OF URINE: ICD-10-CM

## 2023-09-19 LAB
BILIRUB BLD-MCNC: NEGATIVE MG/DL
CLARITY, POC: ABNORMAL
COLOR UR: YELLOW
EXPIRATION DATE: ABNORMAL
GLUCOSE UR STRIP-MCNC: NEGATIVE MG/DL
KETONES UR QL: NEGATIVE
LEUKOCYTE EST, POC: ABNORMAL
Lab: ABNORMAL
NITRITE UR-MCNC: NEGATIVE MG/ML
PH UR: 7 [PH] (ref 5–8)
PROT UR STRIP-MCNC: NEGATIVE MG/DL
RBC # UR STRIP: ABNORMAL /UL
SP GR UR: 1 (ref 1–1.03)
UROBILINOGEN UR QL: NORMAL

## 2023-09-19 RX ORDER — NITROFURANTOIN 25; 75 MG/1; MG/1
100 CAPSULE ORAL 2 TIMES DAILY
Qty: 14 CAPSULE | Refills: 0 | Status: SHIPPED | OUTPATIENT
Start: 2023-09-19

## 2023-09-19 NOTE — PROGRESS NOTES
Office Visit General Established Female Patient     Patient Name: Raquel Mariee  : 1955   MRN: [id number]     Chief Complaint:   Chief Complaint   Patient presents with    Follow-up     UTI symptoms       Referring Provider: No ref. provider found    History of Present Illness: Raquel Mariee is a 67 y.o. female with history below in assessment, who presents for follow up.   Symptoms started  with bladder pain, then having frequency, urgency, and burning with urination . She had to get up 6 times over night  Had 3-4 episodes of UUI today         Subjective      Review of System:   As noted in HPI     Past Medical History:   Past Medical History:   Diagnosis Date    Anxiety     Aortic valve stenosis     Cardiac murmur     Chronic kidney disease     Stage III    Clostridium difficile infection     in her 30s    COPD (chronic obstructive pulmonary disease)     Depression     Diabetes mellitus     type II    Eosinophilic asthma     Fibromyalgia, primary     History of transfusion     in  at [facility].  No reaction noted, patient states she does have an antibody from transfusion.    Hypertension     Hypogammaglobulinemia     Hypothyroidism     Irritable bowel syndrome     Migraines     Morbid obesity     Osteoarthritis     Prinzmetal angina     Pseudomonas infection     PTSD (post-traumatic stress disorder)     Renal insufficiency     Sinus tachycardia     Sleep apnea     no longer uses/needs cpap    Tachycardia     TIA (transient ischemic attack) 2017    Trochanteric bursitis 2023       Past Surgical History:   Past Surgical History:   Procedure Laterality Date    APPENDECTOMY      BUNIONECTOMY Bilateral     CARDIAC CATHETERIZATION      no stents needed    CARPAL TUNNEL RELEASE Right     COLONOSCOPY      ENDOMETRIAL ABLATION      EYE SURGERY  ? About 6-8 years ago    Laser for glaucoma    FRACTURE SURGERY      No hardware; Tibeal plateau     GASTRIC BYPASS      HAND SURGERY Left     No hardware    INTERSTIM PLACEMENT N/A 05/03/2023    Procedure: INTERSTIM STAGES 1 AND 2 LEAD AND GENERATOR PLACEMENT;  Surgeon: Abner Nation MD;  Location: [facility] OR;  Service: Urology;  Laterality: N/A;    POSTERIOR VAGINAL REPAIR N/A 8/2/2023    Procedure: POSTERIOR COLPORRHAPHY;  Surgeon: Kellen Galan MD;  Location: [facility] OR;  Service: Gynecology;  Laterality: N/A;    REPLACEMENT TOTAL KNEE BILATERAL      TEETH EXTRACTION      all of bottom teeth removed    THORACOTOMY      TONSILLECTOMY      TOTAL ABDOMINAL HYSTERECTOMY WITH SALPINGO OOPHORECTOMY      TRACHEAL SURGERY      Repaired via thoracotomy    TUBAL ABDOMINAL LIGATION  1983       Family History:   Family History   Problem Relation Age of Onset    Diabetes Mother     Stroke Mother     Heart disease Mother     Hypertension Mother     Endometriosis Mother     Depression Mother     Diabetes Father     Hypertension Father     Stroke Father     Heart attack Father     Asthma Father     COPD Father     Dementia Father     Heart disease Father     COPD Sister     Asthma Sister     Cancer Sister         Nasal cell skin    Brain cancer Sister     Diabetes Sister     Stroke Sister     Heart attack Sister     Endometriosis Sister     Depression Sister     Arthritis Sister         Rheumatoid    Hypertension Sister     Kidney disease Sister     Diabetes Sister     COPD Sister     Asthma Sister     Endometriosis Sister     Depression Sister     Cancer Sister         Glioma    Heart disease Sister     Heart attack Sister     Endometriosis Sister     Asthma Sister     Hypertension Sister     Kidney disease Sister         ESRD    COPD Brother     Asthma Brother         Turned into COPD    Diabetes Brother     Asthma Brother     COPD Brother     Diabetes Brother     Hypertension Brother     Asthma Daughter     Endometriosis Daughter     Osteoarthritis Daughter     Hypertension Daughter     Kidney failure  Daughter     Irritable bowel syndrome Daughter     Anxiety disorder Daughter     Bipolar disorder Daughter     Depression Daughter     Self-Injurious Behavior  Daughter     Suicide Attempts Daughter     Mental illness Daughter         Bipolar and eating fisorder    Asthma Daughter     Endometriosis Daughter     Osteoarthritis Daughter     Asthma Son     ADD / ADHD Son     Alcohol abuse Son     Drug abuse Son     Breast cancer Maternal Cousin     Heart disease Maternal Grandfather     Heart disease Maternal Uncle     OCD Neg Hx     Paranoid behavior Neg Hx     Schizophrenia Neg Hx     Seizures Neg Hx     Ovarian cancer Neg Hx        Social History:   Social History     Socioeconomic History    Marital status:    Tobacco Use    Smoking status: Never     Passive exposure: Never    Smokeless tobacco: Never   Vaping Use    Vaping Use: Never used   Substance and Sexual Activity    Alcohol use: Not Currently     Comment: ONCE A YEAR    Drug use: Never    Sexual activity: Defer     Birth control/protection: Bilateral salpingectomy , Hysterectomy       Medications:     Current Outpatient Medications:     acetaminophen-codeine (TYLENOL #3) 300-30 MG per tablet, Take 1 tablet by mouth Every 4 (Four) Hours As Needed for Moderate Pain., Disp: 10 tablet, Rfl: 0    albuterol sulfate  (90 Base) MCG/ACT inhaler, Inhale 2 puffs by mouth every 4 to 6 hours as needed for cough, wheeze, shortness of breath. Use with vortex spacer, Disp: 8.5 g, Rfl: 3    allopurinol (ZYLOPRIM) 100 MG tablet, Take 1 tablet by mouth Daily., Disp: 90 tablet, Rfl: 3    benzonatate (TESSALON) 200 MG capsule, Take 1 capsule by mouth 3 (Three) Times a Day As Needed for Cough., Disp: 21 capsule, Rfl: 0    betamethasone valerate (VALISONE) 0.1 % cream, Apply topically to the appropriate area as directed Daily., Disp: 45 g, Rfl: 11    Budeson-Glycopyrrol-Formoterol (Breztri Aerosphere) 160-9-4.8 MCG/ACT aerosol inhaler, Inhale 2 puffs by mouth twice  daily. Rinse mouth after use, Disp: 10.7 g, Rfl: 11    buPROPion SR (WELLBUTRIN SR) 150 MG 12 hr tablet, Take 1 tablet by mouth 2 (Two) Times a Day., Disp: 180 tablet, Rfl: 3    calcium carbonate (Antacid Maximum) 1000 MG chewable tablet, Chew 500 mg 3 (Three) Times a Day., Disp: , Rfl:     cholecalciferol (VITAMIN D3) 25 MCG (1000 UT) tablet, Take 1 tablet by mouth Daily., Disp: , Rfl:     clonazePAM (KlonoPIN) 0.5 MG tablet, Take 1 tablet by mouth At Night As Needed, Disp: 30 tablet, Rfl: 2    Dextromethorphan-guaiFENesin (MUCINEX DM PO), Take 2 tablets by mouth 2 (Two) Times a Day., Disp: , Rfl:     dicyclomine (BENTYL) 10 MG capsule, Take 1 capsule by mouth 3 (Three) Times a Day., Disp: 270 capsule, Rfl: 3    diphenhydrAMINE (Benadryl Allergy) 25 MG tablet, Take 1-2 tablets by mouth Every 4-6 Hours As Needed., Disp: 90 tablet, Rfl: 11    estradiol (ESTRACE) 0.1 MG/GM vaginal cream, Insert 0.5 grams vaginally twice weekly, Disp: 42.5 g, Rfl: 5    ferrous sulfate 325 (65 FE) MG EC tablet, Take 1 tablet by mouth Daily With Breakfast., Disp: , Rfl:     fexofenadine (ALLEGRA) 180 MG tablet, Take 1 tablet by mouth Daily., Disp: , Rfl:     fluconazole (Diflucan) 100 MG tablet, Take 1 tablet by mouth Daily., Disp: 1 tablet, Rfl: 0    fluticasone (FLONASE) 50 MCG/ACT nasal spray, Instill 1 spray into each nostril as directed by provider Daily., Disp: 16 g, Rfl: 1    furosemide (LASIX) 40 MG tablet, Take 1 tablet by mouth Daily, twice a month, Disp: 30 tablet, Rfl: 1    glucosamine-chondroitin 500-400 MG capsule capsule, Take 1 capsule by mouth 2 (Two) Times a Day With Meals., Disp: , Rfl:     Hypertonic Nasal Wash (Sinus Rinse Kit) pack, use once or twice daily as needed, Disp: 1 each, Rfl: 3    immune globulin, human, (Gammagard) 20 GM/200ML solution infusion, Infuse 40 g into a venous catheter Every 28 (Twenty-Eight) Days., Disp: 400 mL, Rfl: 0    ipratropium (ATROVENT) 0.06 % nasal spray, Instill 1-2 sprays each  nostril 2-3 times daily as needed, Disp: 15 mL, Rfl: 3    ipratropium-albuterol (DUO-NEB) 0.5-2.5 mg/3 ml nebulizer, Inhale the contents of 1 vial into nebulizer every 4-6 hours as needed for cough,wheezing and shortness of breath., Disp: 90 mL, Rfl: 3    isosorbide mononitrate (IMDUR) 30 MG 24 hr tablet, Take 1 tablet by mouth Every Morning., Disp: 90 tablet, Rfl: 3    levothyroxine (SYNTHROID, LEVOTHROID) 50 MCG tablet, Take 1 tablet by mouth Daily., Disp: 90 tablet, Rfl: 3    Magnesium 250 MG tablet, Take 2 tablets by mouth 2 (Two) Times a Day., Disp: , Rfl:     montelukast (SINGULAIR) 10 MG tablet, Take 1 tablet by mouth every night at bedtime., Disp: 90 tablet, Rfl: 3    montelukast (SINGULAIR) 10 MG tablet, Take 1 tablet by mouth every night at bedtime., Disp: 30 tablet, Rfl: 11    multivitamin with minerals tablet tablet, Take 1 tablet by mouth Daily., Disp: , Rfl:     nitrofurantoin, macrocrystal-monohydrate, (Macrobid) 100 MG capsule, Take 1 capsule by mouth 2 (Two) Times a Day., Disp: 14 capsule, Rfl: 0    omeprazole (priLOSEC) 40 MG capsule, Take 1 capsule by mouth 2 (Two) Times a Day., Disp: 180 capsule, Rfl: 3    ondansetron (ZOFRAN) 4 MG tablet, Take 1 tablet by mouth Every 8 (Eight) Hours As Needed for Nausea or Vomiting., Disp: 30 tablet, Rfl: 11    POTASSIUM CHLORIDE PO, Take 20 mEq by mouth See Admin Instructions. Takes two times a month with Lasix, Disp: , Rfl:     potassium citrate (UROCIT-K) 10 MEQ (1080 MG) CR tablet, Take 1 tablet by mouth Daily., Disp: 90 tablet, Rfl: 3    predniSONE (DELTASONE) 20 MG tablet, Take 2 tablets prior to Gammgard infusion, Disp: 6 tablet, Rfl: 3    predniSONE (DELTASONE) 20 MG tablet, Take 2 tablets by mouth prior to Gammgard infusion, Disp: 6 tablet, Rfl: 3    Spacer/Aero-Holding Chambers (AeroChamber Plus Wali-Vu) misc, Use as directed with albuterol inhaler, Disp: 1 each, Rfl: 0    tiZANidine (ZANAFLEX) 4 MG tablet, Take 1 tablet by mouth Every Night., Disp: 90  tablet, Rfl: 1    ubrogepant (Ubrelvy) 100 MG tablet, Take 1 tablet by mouth 1 (One) Time As Needed (migraine)., Disp: 6 tablet, Rfl: 0    valsartan (DIOVAN) 160 MG tablet, Take 1 tablet by mouth Daily., Disp: 90 tablet, Rfl: 3    vitamin B-12 (CYANOCOBALAMIN) 1000 MCG tablet, Take 1 tablet by mouth Daily., Disp: , Rfl:     Current Facility-Administered Medications:     heparin injection 500 Units, 5 mL, Intravenous, PRN, Sanjeev Rawls MD    sodium chloride 0.9 % flush 10 mL, 10 mL, Intravenous, Q12H, Sanjeev Rawls MD    sodium chloride 0.9 % flush 10 mL, 10 mL, Intravenous, PRN, Sanjeev Rawls MD    sodium chloride 0.9 % flush 20 mL, 20 mL, Intravenous, PRN, Sanjeev Rawls MD    Allergies:   Allergies   Allergen Reactions    Cefaclor Hives and Swelling     Other reaction(s): SWELLING  Shed 4 layers of skin and extremely SOB  Shed 4 layers of skin and extremely SOB      Cephalosporins Hives, Swelling and Angioedema     Rechallenge with cefipime caused rash on 1/14/08.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    Verapamil Nausea And Vomiting     Dizzy    Zolpidem Other (See Comments) and Unknown (See Comments)     Automatic behaviour in sleep.  Automatic behaviour in sleep.  confusion    Cephalexin Rash    Edecrin [Ethacrynic Acid] Rash and Other  "(See Comments)     Weight gain    Hydrochlorothiazide Hives    Hydrocodone-Acetaminophen GI Intolerance     Tylenol is OK and Codeine is OK.  Pt states problem is just with HYDROcodone.    Sulfa Antibiotics Hives       Objective     Physical Exam:   Vital Signs:   Visit Vitals  /86 (BP Location: Left arm, Patient Position: Sitting, Cuff Size: Adult)   Temp 98.2 °F (36.8 °C) (Temporal)   Ht 162.6 cm (64.02\")   Wt 95.3 kg (210 lb)   BMI 36.03 kg/m²      Body mass index is 36.03 kg/m².     Constitutional: NAD, WDWN.   Neurological: A + O x 3   Psychiatric:  Normal mood and affect    Labs  Brief Urine Lab Results  (Last result in the past 365 days)        Color   Clarity   Blood   Leuk Est   Nitrite   Protein   CREAT   Urine HCG        09/19/23 1405 Yellow   Slightly Cloudy   2+   Moderate (2+)   Negative   Negative                   Lab Results   Component Value Date    GLUCOSE 112 (H) 07/31/2023    CALCIUM 9.0 07/31/2023     (L) 07/31/2023    K 4.3 07/31/2023    CO2 24.0 07/31/2023     07/31/2023    BUN 12 07/31/2023    CREATININE 0.94 07/31/2023    EGFRIFAFRI 50 (L) 06/30/2021    EGFRIFNONA 48 (L) 02/14/2022    BCR 12.8 07/31/2023    ANIONGAP 8.0 07/31/2023       Lab Results   Component Value Date    WBC 4.98 07/31/2023    HGB 12.3 07/31/2023    HCT 37.0 07/31/2023    MCV 97.6 (H) 07/31/2023     07/31/2023            Radiographic Studies  No Images in the past 120 days found..    I have reviewed the above labs and imaging.     Assessment / Plan      Assessment/Plan:   Diagnoses and all orders for this visit:    1. Urinary tract infection without hematuria, site unspecified (Primary)  -     POC Urinalysis Dipstick, Automated  -     nitrofurantoin, macrocrystal-monohydrate, (Macrobid) 100 MG capsule; Take 1 capsule by mouth 2 (Two) Times a Day.  Dispense: 14 capsule; Refill: 0  -     Urine Culture - Urine, Urine, Clean Catch    2. Urge incontinence of urine    67-year-old female with a history " of InterStim for UUI she recently developed symptomatic UTI UA today is concerning with 2+ leukocytes and 2+ blood empirically start Macrobid while awaiting urinary culture.  We discussed if UTI improves patient continues to have UUI she may need a program change we will have her follow-up in the office or she can call [employer] and they can walk her through program change over the phone we will plan to keep her regular scheduled follow-up in January.    Macrobid twice daily x7 days  Urine culture ordered will change treatment plan per culture results if needed  Continue topical vaginal estradiol twice weekly  Courage to increase fluid intake while experiencing UTI    Follow Up:   Return if symptoms worsen or fail to improve.    LILIBETH Cherry, NP-C  [facility]

## 2023-09-19 NOTE — TELEPHONE ENCOUNTER
Caller: CHIQUI MILAN    Relationship: SELF    Best call back number: [phone]    Patient is needing: INCOMING CALL FROM PT. PT STATES SHE HAS FREQUENCY, BURNING, CRAMPING, AND SEVERE URGENCY AND WOULD LIKE A RESPONSE FROM [facility] TODAY. WONDERING IF PT CAN HAVE A UA ORDERED FOR HER.     PT HAD ALREADY SENT A [employer] MESSAGE, PT WOULD LIKE A RESPONSE BEFORE 2:00.

## 2023-09-20 ENCOUNTER — TELEPHONE (OUTPATIENT)
Dept: PULMONOLOGY | Facility: CLINIC | Age: 68
End: 2023-09-20
Payer: MEDICARE

## 2023-09-20 DIAGNOSIS — J45.50 SEVERE PERSISTENT ASTHMA WITHOUT COMPLICATION: ICD-10-CM

## 2023-09-20 RX ORDER — BUDESONIDE, GLYCOPYRROLATE, AND FORMOTEROL FUMARATE 160; 9; 4.8 UG/1; UG/1; UG/1
AEROSOL, METERED RESPIRATORY (INHALATION)
Qty: 10.7 G | Refills: 11 | Status: SHIPPED | OUTPATIENT
Start: 2023-09-20

## 2023-09-20 NOTE — TELEPHONE ENCOUNTER
----- Message from Raquel Mariee sent at 9/20/2023 12:21 PM EDT -----  Regarding: Breztri  Contact: [phone]  [place].  I have not heard of the company that sells Breztri is going to help me with samples. Do you have any information??  If not I will need to change to something cheaper.  According to the pharmacist my best bet would be the generic for advair.  Will wait to hear from you.  Lilian [phone].  I use [facility] [place] in [place].

## 2023-09-20 NOTE — TELEPHONE ENCOUNTER
Returned Pts call to let her know there was mistake on my part. She has now been enrolled into [employer] Pts assistance program.

## 2023-09-22 LAB
BACTERIA UR CULT: ABNORMAL
BACTERIA UR CULT: ABNORMAL

## 2023-09-26 DIAGNOSIS — Z79.4 TYPE 2 DIABETES MELLITUS WITHOUT COMPLICATION, WITH LONG-TERM CURRENT USE OF INSULIN: ICD-10-CM

## 2023-09-26 DIAGNOSIS — E11.9 TYPE 2 DIABETES MELLITUS WITHOUT COMPLICATION, WITH LONG-TERM CURRENT USE OF INSULIN: ICD-10-CM

## 2023-09-26 DIAGNOSIS — J32.0 MAXILLARY SINUSITIS, UNSPECIFIED CHRONICITY: ICD-10-CM

## 2023-10-03 DIAGNOSIS — F41.9 ANXIETY: ICD-10-CM

## 2023-10-06 DIAGNOSIS — F41.9 ANXIETY: ICD-10-CM

## 2023-10-06 RX ORDER — CLONAZEPAM 0.5 MG/1
0.5 TABLET ORAL NIGHTLY PRN
Qty: 30 TABLET | Refills: 2 | Status: SHIPPED | OUTPATIENT
Start: 2023-10-06

## 2023-10-06 RX ORDER — CLARITHROMYCIN 500 MG/1
500 TABLET, COATED ORAL EVERY 12 HOURS SCHEDULED
Qty: 20 TABLET | Refills: 0 | Status: SHIPPED | OUTPATIENT
Start: 2023-10-06

## 2023-10-13 ENCOUNTER — HOSPITAL ENCOUNTER (OUTPATIENT)
Dept: INFUSION THERAPY | Facility: HOSPITAL | Age: 68
Discharge: HOME | End: 2023-10-13
Payer: MEDICARE

## 2023-10-13 VITALS
RESPIRATION RATE: 18 BRPM | DIASTOLIC BLOOD PRESSURE: 77 MMHG | SYSTOLIC BLOOD PRESSURE: 143 MMHG | TEMPERATURE: 97.9 F | HEART RATE: 76 BPM | OXYGEN SATURATION: 96 %

## 2023-10-13 DIAGNOSIS — D80.1 COMMON VARIABLE AGAMMAGLOBULINEMIA: Primary | ICD-10-CM

## 2023-10-13 DIAGNOSIS — Z95.828 PORT-A-CATH IN PLACE: ICD-10-CM

## 2023-10-13 PROCEDURE — 25010000002 IMMUNE GLOBULIN (HUMAN) 20 GM/200ML SOLUTION: Performed by: ALLERGY & IMMUNOLOGY

## 2023-10-13 PROCEDURE — 25010000002 HEPARIN LOCK FLUSH PER 10 UNITS: Performed by: ALLERGY & IMMUNOLOGY

## 2023-10-13 PROCEDURE — 25810000003 SODIUM CHLORIDE 0.9 % SOLUTION: Performed by: ALLERGY & IMMUNOLOGY

## 2023-10-13 PROCEDURE — 96366 THER/PROPH/DIAG IV INF ADDON: CPT

## 2023-10-13 PROCEDURE — 96365 THER/PROPH/DIAG IV INF INIT: CPT

## 2023-10-13 RX ORDER — PREDNISONE 20 MG/1
40 TABLET ORAL ONCE AS NEEDED
Start: 2023-11-03

## 2023-10-13 RX ORDER — HEPARIN SODIUM (PORCINE) LOCK FLUSH IV SOLN 100 UNIT/ML 100 UNIT/ML
500 SOLUTION INTRAVENOUS AS NEEDED
Status: DISCONTINUED | OUTPATIENT
Start: 2023-10-13 | End: 2023-10-15 | Stop reason: HOSPADM

## 2023-10-13 RX ORDER — DIPHENHYDRAMINE HCL 25 MG
50 CAPSULE ORAL ONCE
Start: 2023-11-03 | End: 2023-11-03

## 2023-10-13 RX ORDER — SODIUM CHLORIDE 0.9 % (FLUSH) 0.9 %
10 SYRINGE (ML) INJECTION AS NEEDED
OUTPATIENT
Start: 2023-10-13

## 2023-10-13 RX ORDER — HEPARIN SODIUM (PORCINE) LOCK FLUSH IV SOLN 100 UNIT/ML 100 UNIT/ML
500 SOLUTION INTRAVENOUS AS NEEDED
OUTPATIENT
Start: 2023-10-13

## 2023-10-13 RX ORDER — PREDNISONE 20 MG/1
40 TABLET ORAL ONCE
Status: DISCONTINUED | OUTPATIENT
Start: 2023-10-13 | End: 2023-10-15 | Stop reason: HOSPADM

## 2023-10-13 RX ORDER — DIPHENHYDRAMINE HCL 25 MG
50 CAPSULE ORAL ONCE AS NEEDED
Status: DISCONTINUED | OUTPATIENT
Start: 2023-10-13 | End: 2023-10-15 | Stop reason: HOSPADM

## 2023-10-13 RX ORDER — SODIUM CHLORIDE 0.9 % (FLUSH) 0.9 %
10 SYRINGE (ML) INJECTION AS NEEDED
Status: DISCONTINUED | OUTPATIENT
Start: 2023-10-13 | End: 2023-10-15 | Stop reason: HOSPADM

## 2023-10-13 RX ORDER — METHYLPREDNISOLONE SODIUM SUCCINATE 125 MG/2ML
50 INJECTION, POWDER, LYOPHILIZED, FOR SOLUTION INTRAMUSCULAR; INTRAVENOUS ONCE AS NEEDED
Start: 2023-11-03

## 2023-10-13 RX ORDER — PREDNISONE 20 MG/1
40 TABLET ORAL ONCE
Start: 2023-11-03 | End: 2023-11-03

## 2023-10-13 RX ORDER — ACETAMINOPHEN 325 MG/1
325 TABLET ORAL ONCE
Status: DISCONTINUED | OUTPATIENT
Start: 2023-10-13 | End: 2023-10-15 | Stop reason: HOSPADM

## 2023-10-13 RX ORDER — METHYLPREDNISOLONE SODIUM SUCCINATE 125 MG/2ML
50 INJECTION, POWDER, LYOPHILIZED, FOR SOLUTION INTRAMUSCULAR; INTRAVENOUS ONCE AS NEEDED
Status: DISCONTINUED | OUTPATIENT
Start: 2023-10-13 | End: 2023-10-15 | Stop reason: HOSPADM

## 2023-10-13 RX ORDER — DIPHENHYDRAMINE HCL 25 MG
50 CAPSULE ORAL ONCE
Status: DISCONTINUED | OUTPATIENT
Start: 2023-10-13 | End: 2023-10-15 | Stop reason: HOSPADM

## 2023-10-13 RX ORDER — PREDNISONE 20 MG/1
40 TABLET ORAL ONCE AS NEEDED
Status: DISCONTINUED | OUTPATIENT
Start: 2023-10-13 | End: 2023-10-15 | Stop reason: HOSPADM

## 2023-10-13 RX ORDER — DIPHENHYDRAMINE HCL 25 MG
50 CAPSULE ORAL ONCE AS NEEDED
Start: 2023-11-03

## 2023-10-13 RX ORDER — ACETAMINOPHEN 325 MG/1
325 TABLET ORAL ONCE
OUTPATIENT
Start: 2023-11-03

## 2023-10-13 RX ORDER — EPINEPHRINE 1 MG/ML
0.3 INJECTION, SOLUTION INTRAMUSCULAR; SUBCUTANEOUS ONCE AS NEEDED
Start: 2023-11-03

## 2023-10-13 RX ORDER — EPINEPHRINE 1 MG/ML
0.3 INJECTION, SOLUTION INTRAMUSCULAR; SUBCUTANEOUS ONCE AS NEEDED
Status: DISCONTINUED | OUTPATIENT
Start: 2023-10-13 | End: 2023-10-15 | Stop reason: HOSPADM

## 2023-10-13 RX ADMIN — IMMUNE GLOBULIN INFUSION (HUMAN) 20 G: 100 INJECTION, SOLUTION INTRAVENOUS; SUBCUTANEOUS at 11:22

## 2023-10-13 RX ADMIN — IMMUNE GLOBULIN INFUSION (HUMAN) 20 G: 100 INJECTION, SOLUTION INTRAVENOUS; SUBCUTANEOUS at 09:34

## 2023-10-13 RX ADMIN — Medication 10 ML: at 13:01

## 2023-10-13 RX ADMIN — HEPARIN 500 UNITS: 100 SYRINGE at 13:01

## 2023-10-13 RX ADMIN — SODIUM CHLORIDE 500 ML: 9 INJECTION, SOLUTION INTRAVENOUS at 08:35

## 2023-10-19 RX ORDER — IPRATROPIUM BROMIDE 42 UG/1
SPRAY, METERED NASAL
Qty: 15 ML | Refills: 3 | Status: SHIPPED | OUTPATIENT
Start: 2023-10-19

## 2023-10-25 ENCOUNTER — PATIENT MESSAGE (OUTPATIENT)
Dept: INTERNAL MEDICINE | Facility: CLINIC | Age: 68
End: 2023-10-25
Payer: MEDICARE

## 2023-10-25 DIAGNOSIS — J32.0 MAXILLARY SINUSITIS, UNSPECIFIED CHRONICITY: ICD-10-CM

## 2023-10-25 DIAGNOSIS — Z79.4 TYPE 2 DIABETES MELLITUS WITHOUT COMPLICATION, WITH LONG-TERM CURRENT USE OF INSULIN: ICD-10-CM

## 2023-10-25 DIAGNOSIS — E11.9 TYPE 2 DIABETES MELLITUS WITHOUT COMPLICATION, WITH LONG-TERM CURRENT USE OF INSULIN: ICD-10-CM

## 2023-10-25 NOTE — TELEPHONE ENCOUNTER
From: Raquel Mariee  To: Sanjeev Rawls  Sent: 10/25/2023 9:49 AM EDT  Subject: Ubrelvy    Do you have any samples that might get a few? Thank you Lilian [phone]

## 2023-10-27 RX ORDER — AZITHROMYCIN 250 MG/1
TABLET, FILM COATED ORAL
Qty: 6 TABLET | Refills: 0 | Status: SHIPPED | OUTPATIENT
Start: 2023-10-27

## 2023-11-10 ENCOUNTER — HOSPITAL ENCOUNTER (OUTPATIENT)
Dept: INFUSION THERAPY | Facility: HOSPITAL | Age: 68
Discharge: HOME | End: 2023-11-10
Payer: MEDICARE

## 2023-11-10 VITALS
SYSTOLIC BLOOD PRESSURE: 132 MMHG | OXYGEN SATURATION: 97 % | HEART RATE: 83 BPM | DIASTOLIC BLOOD PRESSURE: 80 MMHG | TEMPERATURE: 97.9 F | RESPIRATION RATE: 18 BRPM

## 2023-11-10 DIAGNOSIS — Z95.828 PORT-A-CATH IN PLACE: ICD-10-CM

## 2023-11-10 DIAGNOSIS — D80.1 COMMON VARIABLE AGAMMAGLOBULINEMIA: Primary | ICD-10-CM

## 2023-11-10 PROCEDURE — 25010000002 IMMUNE GLOBULIN (HUMAN) 20 GM/200ML SOLUTION: Performed by: ALLERGY & IMMUNOLOGY

## 2023-11-10 PROCEDURE — 25010000002 HEPARIN LOCK FLUSH PER 10 UNITS: Performed by: ALLERGY & IMMUNOLOGY

## 2023-11-10 PROCEDURE — 25810000003 SODIUM CHLORIDE 0.9 % SOLUTION: Performed by: ALLERGY & IMMUNOLOGY

## 2023-11-10 RX ORDER — ACETAMINOPHEN 325 MG/1
325 TABLET ORAL ONCE
Status: DISCONTINUED | OUTPATIENT
Start: 2023-11-10 | End: 2023-11-12 | Stop reason: HOSPADM

## 2023-11-10 RX ORDER — PREDNISONE 20 MG/1
40 TABLET ORAL ONCE AS NEEDED
Start: 2023-12-08

## 2023-11-10 RX ORDER — DIPHENHYDRAMINE HCL 25 MG
50 CAPSULE ORAL ONCE
Status: DISCONTINUED | OUTPATIENT
Start: 2023-11-10 | End: 2023-11-12 | Stop reason: HOSPADM

## 2023-11-10 RX ORDER — DIPHENHYDRAMINE HCL 25 MG
50 CAPSULE ORAL ONCE AS NEEDED
Start: 2023-12-08

## 2023-11-10 RX ORDER — HEPARIN SODIUM (PORCINE) LOCK FLUSH IV SOLN 100 UNIT/ML 100 UNIT/ML
500 SOLUTION INTRAVENOUS AS NEEDED
OUTPATIENT
Start: 2023-11-10

## 2023-11-10 RX ORDER — PREDNISONE 20 MG/1
40 TABLET ORAL ONCE
Status: DISCONTINUED | OUTPATIENT
Start: 2023-11-10 | End: 2023-11-12 | Stop reason: HOSPADM

## 2023-11-10 RX ORDER — EPINEPHRINE 1 MG/ML
0.3 INJECTION, SOLUTION INTRAMUSCULAR; SUBCUTANEOUS ONCE AS NEEDED
Start: 2023-12-08

## 2023-11-10 RX ORDER — SODIUM CHLORIDE 0.9 % (FLUSH) 0.9 %
10 SYRINGE (ML) INJECTION AS NEEDED
OUTPATIENT
Start: 2023-11-10

## 2023-11-10 RX ORDER — SODIUM CHLORIDE 0.9 % (FLUSH) 0.9 %
10 SYRINGE (ML) INJECTION AS NEEDED
Status: DISCONTINUED | OUTPATIENT
Start: 2023-11-10 | End: 2023-11-12 | Stop reason: HOSPADM

## 2023-11-10 RX ORDER — PREDNISONE 20 MG/1
40 TABLET ORAL ONCE
Start: 2023-12-08 | End: 2023-12-08

## 2023-11-10 RX ORDER — DIPHENHYDRAMINE HCL 25 MG
50 CAPSULE ORAL ONCE
Start: 2023-12-08 | End: 2023-12-08

## 2023-11-10 RX ORDER — HEPARIN SODIUM (PORCINE) LOCK FLUSH IV SOLN 100 UNIT/ML 100 UNIT/ML
500 SOLUTION INTRAVENOUS AS NEEDED
Status: DISCONTINUED | OUTPATIENT
Start: 2023-11-10 | End: 2023-11-12 | Stop reason: HOSPADM

## 2023-11-10 RX ORDER — ACETAMINOPHEN 325 MG/1
325 TABLET ORAL ONCE
OUTPATIENT
Start: 2023-12-08

## 2023-11-10 RX ORDER — METHYLPREDNISOLONE SODIUM SUCCINATE 125 MG/2ML
50 INJECTION, POWDER, LYOPHILIZED, FOR SOLUTION INTRAMUSCULAR; INTRAVENOUS ONCE AS NEEDED
Start: 2023-12-08

## 2023-11-10 RX ADMIN — HEPARIN 500 UNITS: 100 SYRINGE at 12:32

## 2023-11-10 RX ADMIN — IMMUNE GLOBULIN INFUSION (HUMAN) 20 G: 100 INJECTION, SOLUTION INTRAVENOUS; SUBCUTANEOUS at 09:26

## 2023-11-10 RX ADMIN — SODIUM CHLORIDE 500 ML: 9 INJECTION, SOLUTION INTRAVENOUS at 09:00

## 2023-11-10 RX ADMIN — Medication 10 ML: at 12:32

## 2023-11-10 RX ADMIN — IMMUNE GLOBULIN INFUSION (HUMAN) 20 G: 100 INJECTION, SOLUTION INTRAVENOUS; SUBCUTANEOUS at 11:09

## 2023-11-13 ENCOUNTER — PATIENT MESSAGE (OUTPATIENT)
Dept: INTERNAL MEDICINE | Facility: CLINIC | Age: 68
End: 2023-11-13
Payer: MEDICARE

## 2023-11-13 DIAGNOSIS — J32.0 MAXILLARY SINUSITIS, UNSPECIFIED CHRONICITY: ICD-10-CM

## 2023-11-13 DIAGNOSIS — E11.9 TYPE 2 DIABETES MELLITUS WITHOUT COMPLICATION, WITH LONG-TERM CURRENT USE OF INSULIN: ICD-10-CM

## 2023-11-13 DIAGNOSIS — Z79.4 TYPE 2 DIABETES MELLITUS WITHOUT COMPLICATION, WITH LONG-TERM CURRENT USE OF INSULIN: ICD-10-CM

## 2023-11-13 NOTE — TELEPHONE ENCOUNTER
From: Raquel Mariee  To: Sanjeev Rawls  Sent: 11/13/2023 2:41 PM EST  Subject: Ubrelvy     Kylie, in need of samples if you have them. Thanks Lilian

## 2023-11-15 ENCOUNTER — HOSPITAL ENCOUNTER (EMERGENCY)
Facility: HOSPITAL | Age: 68
Discharge: HOME | End: 2023-11-15
Attending: EMERGENCY MEDICINE
Payer: MEDICARE

## 2023-11-15 VITALS
RESPIRATION RATE: 18 BRPM | HEIGHT: 62 IN | WEIGHT: 197 LBS | SYSTOLIC BLOOD PRESSURE: 140 MMHG | BODY MASS INDEX: 36.25 KG/M2 | HEART RATE: 88 BPM | TEMPERATURE: 98.6 F | OXYGEN SATURATION: 96 % | DIASTOLIC BLOOD PRESSURE: 90 MMHG

## 2023-11-15 DIAGNOSIS — J45.901 ASTHMATIC BRONCHITIS WITH ACUTE EXACERBATION, UNSPECIFIED ASTHMA SEVERITY, UNSPECIFIED WHETHER PERSISTENT: Primary | ICD-10-CM

## 2023-11-15 LAB
FLUAV SUBTYP SPEC NAA+PROBE: NOT DETECTED
FLUBV RNA ISLT QL NAA+PROBE: NOT DETECTED
RSV AG SPEC QL: NEGATIVE
S PYO AG THROAT QL: NEGATIVE
SARS-COV-2 RNA RESP QL NAA+PROBE: NOT DETECTED

## 2023-11-15 PROCEDURE — 99283 EMERGENCY DEPT VISIT LOW MDM: CPT

## 2023-11-15 PROCEDURE — 87807 RSV ASSAY W/OPTIC: CPT | Performed by: EMERGENCY MEDICINE

## 2023-11-15 PROCEDURE — 87636 SARSCOV2 & INF A&B AMP PRB: CPT | Performed by: EMERGENCY MEDICINE

## 2023-11-15 PROCEDURE — 87880 STREP A ASSAY W/OPTIC: CPT | Performed by: EMERGENCY MEDICINE

## 2023-11-15 PROCEDURE — 87081 CULTURE SCREEN ONLY: CPT | Performed by: EMERGENCY MEDICINE

## 2023-11-15 RX ORDER — DOXYCYCLINE 100 MG/1
100 CAPSULE ORAL 2 TIMES DAILY
Qty: 14 CAPSULE | Refills: 0 | Status: SHIPPED | OUTPATIENT
Start: 2023-11-15

## 2023-11-15 RX ORDER — PREDNISONE 10 MG/1
TABLET ORAL
Qty: 48 TABLET | Refills: 0 | Status: SHIPPED | OUTPATIENT
Start: 2023-11-15

## 2023-11-15 RX ORDER — PROMETHAZINE HYDROCHLORIDE AND CODEINE PHOSPHATE 6.25; 1 MG/5ML; MG/5ML
5 SYRUP ORAL EVERY 6 HOURS PRN
Qty: 60 ML | Refills: 0 | Status: SHIPPED | OUTPATIENT
Start: 2023-11-15

## 2023-11-15 RX ORDER — GUAIFENESIN/DEXTROMETHORPHAN 100-10MG/5
10 SYRUP ORAL ONCE
Status: COMPLETED | OUTPATIENT
Start: 2023-11-15 | End: 2023-11-15

## 2023-11-15 RX ADMIN — GUAIFENESIN AND DEXTROMETHORPHAN 10 ML: 100; 10 SYRUP ORAL at 11:02

## 2023-11-15 NOTE — ED PROVIDER NOTES
Subjective  History of Present Illness:    Chief Complaint: Cough congestion, difficulty breathing    History of Present Illness: 67-year-old female history of asthma chronic bronchitis, having ongoing chest congestion difficulty breathing, day 2 of prednisone 20 mg daily.  No relief with home medications    Nurses Notes reviewed and agree, including vitals, allergies, social history and prior medical history.     REVIEW OF SYSTEMS: All systems reviewed and not pertinent unless noted.  Review of Systems      Positive for: Cough congestion shortness of breath    Negative for: Fever hemoptysis syncope palpitations chest pain edema    Past Medical History:   Diagnosis Date    Anxiety     Aortic valve stenosis     Cardiac murmur     Chronic kidney disease     Stage III    Clostridium difficile infection     in her 30s    COPD (chronic obstructive pulmonary disease)     Depression     Diabetes mellitus     type II    Eosinophilic asthma     Fibromyalgia, primary     History of transfusion     in [facility] at [facility].  No reaction noted, patient states she does have an antibody from transfusion.    Hypertension     Hypogammaglobulinemia     Hypothyroidism     Irritable bowel syndrome     Migraines     Morbid obesity     Osteoarthritis     Prinzmetal angina 1990    Pseudomonas infection 1998    PTSD (post-traumatic stress disorder)     Renal insufficiency     Sinus tachycardia 1990    Sleep apnea     no longer uses/needs cpap    Tachycardia     TIA (transient ischemic attack) 2017    Trochanteric bursitis 01/25/2023       Allergies:    Cefaclor, Cephalosporins, Ambien [zolpidem tartrate], Amoxicillin, Cardizem [diltiazem hcl], Hydrocodone, Lexapro [escitalopram oxalate], Metoclopramide, Mobic [meloxicam], Penicillins, Sumatriptan, Topamax [topiramate], Verapamil, Zolpidem, Cephalexin, Edecrin [ethacrynic acid], Hydrochlorothiazide, Hydrocodone-acetaminophen, and Sulfa antibiotics      Past Surgical History:    Procedure Laterality Date    APPENDECTOMY      BUNIONECTOMY Bilateral     CARDIAC CATHETERIZATION  2017    no stents needed    CARPAL TUNNEL RELEASE Right     COLONOSCOPY  2021    ENDOMETRIAL ABLATION  1997    EYE SURGERY  ? About 6-8 years ago    Laser for glaucoma    FRACTURE SURGERY  1995    No hardware; Tibeal plateau    GASTRIC BYPASS      HAND SURGERY Left     No hardware    INTERSTIM PLACEMENT N/A 05/03/2023    Procedure: INTERSTIM STAGES 1 AND 2 LEAD AND GENERATOR PLACEMENT;  Surgeon: Abner Nation MD;  Location: [facility] OR;  Service: Urology;  Laterality: N/A;    POSTERIOR VAGINAL REPAIR N/A 8/2/2023    Procedure: POSTERIOR COLPORRHAPHY;  Surgeon: Kellen Galan MD;  Location: [facility] OR;  Service: Gynecology;  Laterality: N/A;    REPLACEMENT TOTAL KNEE BILATERAL      TEETH EXTRACTION      all of bottom teeth removed    THORACOTOMY      TONSILLECTOMY      TOTAL ABDOMINAL HYSTERECTOMY WITH SALPINGO OOPHORECTOMY      TRACHEAL SURGERY      Repaired via thoracotomy    TUBAL ABDOMINAL LIGATION  1983         Social History     Socioeconomic History    Marital status:    Tobacco Use    Smoking status: Never     Passive exposure: Never    Smokeless tobacco: Never   Vaping Use    Vaping Use: Never used   Substance and Sexual Activity    Alcohol use: Not Currently     Comment: ONCE A YEAR    Drug use: Never    Sexual activity: Defer     Birth control/protection: Bilateral salpingectomy , Hysterectomy         Family History   Problem Relation Age of Onset    Diabetes Mother     Stroke Mother     Heart disease Mother     Hypertension Mother     Endometriosis Mother     Depression Mother     Diabetes Father     Hypertension Father     Stroke Father     Heart attack Father     Asthma Father     COPD Father     Dementia Father     Heart disease Father     COPD Sister     Asthma Sister     Cancer Sister         Nasal cell skin    Brain cancer Sister     Diabetes Sister     Stroke Sister     Heart  "attack Sister     Endometriosis Sister     Depression Sister     Arthritis Sister         Rheumatoid    Hypertension Sister     Kidney disease Sister     Diabetes Sister     COPD Sister     Asthma Sister     Endometriosis Sister     Depression Sister     Cancer Sister         Glioma    Heart disease Sister     Heart attack Sister     Endometriosis Sister     Asthma Sister     Hypertension Sister     Kidney disease Sister         ESRD    COPD Brother     Asthma Brother         Turned into COPD    Diabetes Brother     Asthma Brother     COPD Brother     Diabetes Brother     Hypertension Brother     Asthma Daughter     Endometriosis Daughter     Osteoarthritis Daughter     Hypertension Daughter     Kidney failure Daughter     Irritable bowel syndrome Daughter     Anxiety disorder Daughter     Bipolar disorder Daughter     Depression Daughter     Self-Injurious Behavior  Daughter     Suicide Attempts Daughter     Mental illness Daughter         Bipolar and eating fisorder    Asthma Daughter     Endometriosis Daughter     Osteoarthritis Daughter     Asthma Son     ADD / ADHD Son     Alcohol abuse Son     Drug abuse Son     Breast cancer Maternal Cousin     Heart disease Maternal Grandfather     Heart disease Maternal Uncle     OCD Neg Hx     Paranoid behavior Neg Hx     Schizophrenia Neg Hx     Seizures Neg Hx     Ovarian cancer Neg Hx        Objective  Physical Exam:  /90 (BP Location: Left arm, Patient Position: Sitting)   Pulse 86   Temp 98.4 °F (36.9 °C) (Oral)   Resp 14   Ht 157.5 cm (62\")   Wt 89.4 kg (197 lb)   SpO2 96%   BMI 36.03 kg/m²      Physical Exam    CONSTITUTIONAL: Well developed, healthy-appearing nontoxic 67-year-old female,  in no acute distress.  VITAL SIGNS: per nursing, reviewed and noted  SKIN: exposed skin with no rashes, ulcerations or petechiae  EYES: Grossly EOMI, no icterus  ENT: Normal voice.  Moist mucous membranes   RESPIRATORY:  No increased work of breathing. No retractions. "  Mild diffuse wheezes, rhonchorous cough  CARDIOVASCULAR:   Extremities pink and warm.  Good cap refill to extremities.   GI: Abdomen without distention   MUSCULOSKELETAL: Age-appropriate bulk and tone, moves all 4 extremities  NEUROLOGIC: Alert, oriented x 3. No gross deficits. GCS 15.   PSYCH: appropriate affect.  : no bladder tenderness or distention, no CVA tenderness    Procedures    ED Course:    Lab Results (last 24 hours)       Procedure Component Value Units Date/Time    Rapid Strep A Screen - Swab, Throat [[id number]]  (Normal) Collected: 11/15/23 0935    Specimen: Swab from Throat Updated: 11/15/23 0951     Strep A Ag Negative    COVID-19 and FLU A/B PCR, 1 HR TAT - Swab, Nasopharynx [[id number]]  (Normal) Collected: 11/15/23 0935    Specimen: Swab from Nasopharynx Updated: 11/15/23 1003     COVID19 Not Detected     Influenza A PCR Not Detected     Influenza B PCR Not Detected    Narrative:      Fact sheet for providers: https://www.fda.gov/media/602272/download    Fact sheet for patients: https://www.fda.gov/media/613212/download    Test performed by PCR.    Beta Strep Culture, Throat - Swab, Throat [[id number]] Collected: 11/15/23 0935    Specimen: Swab from Throat Updated: 11/15/23 0951    RSV Screen - Swab, Nasopharynx [[id number]]  (Normal) Collected: 11/15/23 0956    Specimen: Swab from Nasopharynx Updated: 11/15/23 1028     RSV Rapid Ag Negative             No radiology results from the last 24 hrs     MDM     Amount and/or Complexity of Data Reviewed  Clinical lab tests: reviewed             Medical Decision Making:    Initial impression of presenting illness: 67-year-old female history of asthma chronic bronchitis, having ongoing chest congestion difficulty breathing, day 2 of prednisone 20 mg daily.  No relief with home medications    DDX: includes but is not limited to: Bronchitis pneumonia,         Patient arrives hypertensive afebrile no tachycardia sats 96% with vitals interpreted by myself.      Pertinent features from physical exam: Rhonchi and wheezes.    Initial diagnostic plan: Strep flu COVID RSV    Results from initial plan were reviewed and interpreted by me revealing labs nonactionable    Diagnostic information from other sources: Old record review, [facility]    Interventions / Re-evaluation: Robitussin-DM in emergency department, prescriptions for Phenergan with codeine, doxycycline, prednisone taper    Results/clinical rationale were discussed with patient    Consultations/Discussion of results with other physicians: None    Disposition plan: Discharge  -----      Final diagnoses:   Asthmatic bronchitis with acute exacerbation, unspecified asthma severity, unspecified whether persistent     BANG reviewed by Andrew Padilla DO Finley, Paul W, DO  11/15/23 0891

## 2023-11-17 LAB — BACTERIA SPEC AEROBE CULT: NORMAL

## 2023-11-21 ENCOUNTER — TELEPHONE (OUTPATIENT)
Dept: INTERNAL MEDICINE | Facility: CLINIC | Age: 68
End: 2023-11-21
Payer: MEDICARE

## 2023-11-21 NOTE — TELEPHONE ENCOUNTER
Caller: ABAD    Relationship:     CALLER FROM [facility]    Best call back number:       [phone]     What is the best time to reach you:     M-F 8:00 - 6:00 P.M.    Who are you requesting to speak with (clinical staff, provider,  specific staff member):     DR STANLEY    What was the call regarding:     CALLER REQUESTED A CALL BACK WITH CONFIRMATION THAT DR STANLEY RECEIVED A FAX REGARDING STATIN USE FOR PATIENTS WITH DIABETES AND/OR CARDIOVASCULAR DISEASE

## 2023-11-22 DIAGNOSIS — J45.50 SEVERE PERSISTENT ASTHMA WITHOUT COMPLICATION: Primary | ICD-10-CM

## 2023-11-22 RX ORDER — IPRATROPIUM BROMIDE AND ALBUTEROL SULFATE 2.5; .5 MG/3ML; MG/3ML
SOLUTION RESPIRATORY (INHALATION)
Qty: 90 ML | Refills: 3 | Status: SHIPPED | OUTPATIENT
Start: 2023-11-22

## 2023-11-26 DIAGNOSIS — E11.9 TYPE 2 DIABETES MELLITUS WITHOUT COMPLICATION, WITH LONG-TERM CURRENT USE OF INSULIN: ICD-10-CM

## 2023-11-26 DIAGNOSIS — Z79.4 TYPE 2 DIABETES MELLITUS WITHOUT COMPLICATION, WITH LONG-TERM CURRENT USE OF INSULIN: ICD-10-CM

## 2023-11-27 RX ORDER — FLUCONAZOLE 100 MG/1
100 TABLET ORAL DAILY
Qty: 1 TABLET | Refills: 0 | Status: SHIPPED | OUTPATIENT
Start: 2023-11-27

## 2023-11-27 RX ORDER — TIZANIDINE 4 MG/1
4 TABLET ORAL NIGHTLY
Qty: 90 TABLET | Refills: 1 | Status: SHIPPED | OUTPATIENT
Start: 2023-11-27

## 2023-12-08 ENCOUNTER — PATIENT MESSAGE (OUTPATIENT)
Dept: INTERNAL MEDICINE | Facility: CLINIC | Age: 68
End: 2023-12-08
Payer: MEDICARE

## 2023-12-08 ENCOUNTER — HOSPITAL ENCOUNTER (OUTPATIENT)
Dept: INFUSION THERAPY | Facility: HOSPITAL | Age: 68
Discharge: HOME | End: 2023-12-08
Payer: MEDICARE

## 2023-12-08 VITALS
TEMPERATURE: 98.6 F | RESPIRATION RATE: 18 BRPM | DIASTOLIC BLOOD PRESSURE: 86 MMHG | SYSTOLIC BLOOD PRESSURE: 154 MMHG | OXYGEN SATURATION: 95 % | HEART RATE: 84 BPM

## 2023-12-08 DIAGNOSIS — D80.1 COMMON VARIABLE AGAMMAGLOBULINEMIA: Primary | ICD-10-CM

## 2023-12-08 DIAGNOSIS — E11.9 TYPE 2 DIABETES MELLITUS WITHOUT COMPLICATION, WITH LONG-TERM CURRENT USE OF INSULIN: ICD-10-CM

## 2023-12-08 DIAGNOSIS — J32.0 MAXILLARY SINUSITIS, UNSPECIFIED CHRONICITY: ICD-10-CM

## 2023-12-08 DIAGNOSIS — Z79.4 TYPE 2 DIABETES MELLITUS WITHOUT COMPLICATION, WITH LONG-TERM CURRENT USE OF INSULIN: ICD-10-CM

## 2023-12-08 DIAGNOSIS — Z95.828 PORT-A-CATH IN PLACE: ICD-10-CM

## 2023-12-08 PROCEDURE — 96366 THER/PROPH/DIAG IV INF ADDON: CPT

## 2023-12-08 PROCEDURE — 25810000003 SODIUM CHLORIDE 0.9 % SOLUTION: Performed by: ALLERGY & IMMUNOLOGY

## 2023-12-08 PROCEDURE — 25010000002 IMMUNE GLOBULIN (HUMAN) 20 GM/200ML SOLUTION: Performed by: ALLERGY & IMMUNOLOGY

## 2023-12-08 PROCEDURE — 25010000002 HEPARIN LOCK FLUSH PER 10 UNITS: Performed by: ALLERGY & IMMUNOLOGY

## 2023-12-08 PROCEDURE — 96365 THER/PROPH/DIAG IV INF INIT: CPT

## 2023-12-08 RX ORDER — EPINEPHRINE 1 MG/ML
0.3 INJECTION, SOLUTION INTRAMUSCULAR; SUBCUTANEOUS ONCE AS NEEDED
Start: 2024-01-05

## 2023-12-08 RX ORDER — METHYLPREDNISOLONE SODIUM SUCCINATE 125 MG/2ML
50 INJECTION, POWDER, LYOPHILIZED, FOR SOLUTION INTRAMUSCULAR; INTRAVENOUS ONCE AS NEEDED
Start: 2024-01-05

## 2023-12-08 RX ORDER — HEPARIN SODIUM (PORCINE) LOCK FLUSH IV SOLN 100 UNIT/ML 100 UNIT/ML
500 SOLUTION INTRAVENOUS AS NEEDED
Status: DISCONTINUED | OUTPATIENT
Start: 2023-12-08 | End: 2023-12-10 | Stop reason: HOSPADM

## 2023-12-08 RX ORDER — ACETAMINOPHEN 325 MG/1
325 TABLET ORAL ONCE
OUTPATIENT
Start: 2024-01-05

## 2023-12-08 RX ORDER — DIPHENHYDRAMINE HCL 25 MG
50 CAPSULE ORAL ONCE
Start: 2024-01-05 | End: 2024-01-05

## 2023-12-08 RX ORDER — DIPHENHYDRAMINE HCL 25 MG
50 CAPSULE ORAL ONCE
Status: DISCONTINUED | OUTPATIENT
Start: 2023-12-08 | End: 2023-12-10 | Stop reason: HOSPADM

## 2023-12-08 RX ORDER — PREDNISONE 20 MG/1
40 TABLET ORAL ONCE
Status: DISCONTINUED | OUTPATIENT
Start: 2023-12-08 | End: 2023-12-10 | Stop reason: HOSPADM

## 2023-12-08 RX ORDER — PREDNISONE 20 MG/1
40 TABLET ORAL ONCE AS NEEDED
Start: 2024-01-05

## 2023-12-08 RX ORDER — PREDNISONE 20 MG/1
40 TABLET ORAL ONCE
Start: 2024-01-05 | End: 2024-01-05

## 2023-12-08 RX ORDER — ACETAMINOPHEN 325 MG/1
325 TABLET ORAL ONCE
Status: DISCONTINUED | OUTPATIENT
Start: 2023-12-08 | End: 2023-12-10 | Stop reason: HOSPADM

## 2023-12-08 RX ORDER — SODIUM CHLORIDE 0.9 % (FLUSH) 0.9 %
10 SYRINGE (ML) INJECTION AS NEEDED
Status: DISCONTINUED | OUTPATIENT
Start: 2023-12-08 | End: 2023-12-10 | Stop reason: HOSPADM

## 2023-12-08 RX ORDER — SODIUM CHLORIDE 0.9 % (FLUSH) 0.9 %
10 SYRINGE (ML) INJECTION AS NEEDED
OUTPATIENT
Start: 2023-12-08

## 2023-12-08 RX ORDER — HEPARIN SODIUM (PORCINE) LOCK FLUSH IV SOLN 100 UNIT/ML 100 UNIT/ML
500 SOLUTION INTRAVENOUS AS NEEDED
OUTPATIENT
Start: 2023-12-08

## 2023-12-08 RX ORDER — DIPHENHYDRAMINE HCL 25 MG
50 CAPSULE ORAL ONCE AS NEEDED
Start: 2024-01-05

## 2023-12-08 RX ADMIN — SODIUM CHLORIDE 500 ML: 9 INJECTION, SOLUTION INTRAVENOUS at 08:49

## 2023-12-08 RX ADMIN — HEPARIN 500 UNITS: 100 SYRINGE at 12:56

## 2023-12-08 RX ADMIN — IMMUNE GLOBULIN INFUSION (HUMAN) 20 G: 100 INJECTION, SOLUTION INTRAVENOUS; SUBCUTANEOUS at 11:23

## 2023-12-08 RX ADMIN — Medication 10 ML: at 12:56

## 2023-12-08 RX ADMIN — IMMUNE GLOBULIN INFUSION (HUMAN) 20 G: 100 INJECTION, SOLUTION INTRAVENOUS; SUBCUTANEOUS at 09:20

## 2023-12-11 NOTE — TELEPHONE ENCOUNTER
From: Raquel Mariee  To: Sanjeev Rawls  Sent: 12/8/2023 6:54 PM EST  Subject: Ubrelvy     In need of more Ubrelvy samples if available. Thank you Lilian Mariee [phone].

## 2023-12-18 DIAGNOSIS — J45.50 SEVERE PERSISTENT ASTHMA WITHOUT COMPLICATION: Primary | ICD-10-CM

## 2023-12-18 RX ORDER — PREDNISONE 10 MG/1
TABLET ORAL
Qty: 48 TABLET | Refills: 0 | Status: SHIPPED | OUTPATIENT
Start: 2023-12-18

## 2023-12-18 RX ORDER — HYDROCODONE POLISTIREX AND CHLORPHENIRAMINE POLISTIREX 10; 8 MG/5ML; MG/5ML
5 SUSPENSION, EXTENDED RELEASE ORAL EVERY 12 HOURS PRN
Qty: 50 ML | Refills: 0 | Status: SHIPPED | OUTPATIENT
Start: 2023-12-18

## 2023-12-27 RX ORDER — ONDANSETRON 4 MG/1
4 TABLET, FILM COATED ORAL EVERY 8 HOURS PRN
Qty: 30 TABLET | Refills: 11 | Status: SHIPPED | OUTPATIENT
Start: 2023-12-27

## 2024-01-03 DIAGNOSIS — F41.9 ANXIETY: ICD-10-CM

## 2024-01-04 DIAGNOSIS — N95.8 GENITOURINARY SYNDROME OF MENOPAUSE: Primary | ICD-10-CM

## 2024-01-04 RX ORDER — ESTRADIOL 0.1 MG/G
CREAM VAGINAL
Qty: 42.5 G | Refills: 5 | Status: SHIPPED | OUTPATIENT
Start: 2024-01-04

## 2024-01-05 ENCOUNTER — HOSPITAL ENCOUNTER (OUTPATIENT)
Dept: INFUSION THERAPY | Facility: HOSPITAL | Age: 69
Discharge: HOME | End: 2024-01-05
Payer: MEDICARE

## 2024-01-05 VITALS
OXYGEN SATURATION: 95 % | RESPIRATION RATE: 18 BRPM | HEART RATE: 73 BPM | TEMPERATURE: 97.9 F | DIASTOLIC BLOOD PRESSURE: 69 MMHG | SYSTOLIC BLOOD PRESSURE: 157 MMHG

## 2024-01-05 DIAGNOSIS — D83.9 COMMON VARIABLE IMMUNODEFICIENCY: ICD-10-CM

## 2024-01-05 DIAGNOSIS — D80.1 COMMON VARIABLE AGAMMAGLOBULINEMIA: Primary | ICD-10-CM

## 2024-01-05 DIAGNOSIS — Z95.828 PORT-A-CATH IN PLACE: ICD-10-CM

## 2024-01-05 LAB
ALBUMIN SERPL-MCNC: 4.2 G/DL (ref 3.5–5.2)
ALBUMIN/GLOB SERPL: 1.4 G/DL
ALP SERPL-CCNC: 67 U/L (ref 39–117)
ALT SERPL W P-5'-P-CCNC: 26 U/L (ref 1–33)
ANION GAP SERPL CALCULATED.3IONS-SCNC: 8.2 MMOL/L (ref 5–15)
AST SERPL-CCNC: 38 U/L (ref 1–32)
BASOPHILS # BLD AUTO: 0.06 10*3/MM3 (ref 0–0.2)
BASOPHILS NFR BLD AUTO: 0.9 % (ref 0–1.5)
BILIRUB SERPL-MCNC: 0.4 MG/DL (ref 0–1.2)
BUN SERPL-MCNC: 9 MG/DL (ref 8–23)
BUN/CREAT SERPL: 8.5 (ref 7–25)
CALCIUM SPEC-SCNC: 8.9 MG/DL (ref 8.6–10.5)
CHLORIDE SERPL-SCNC: 98 MMOL/L (ref 98–107)
CO2 SERPL-SCNC: 27.8 MMOL/L (ref 22–29)
CREAT SERPL-MCNC: 1.06 MG/DL (ref 0.57–1)
DEPRECATED RDW RBC AUTO: 46.8 FL (ref 37–54)
EGFRCR SERPLBLD CKD-EPI 2021: 57.3 ML/MIN/1.73
EOSINOPHIL # BLD AUTO: 0.17 10*3/MM3 (ref 0–0.4)
EOSINOPHIL NFR BLD AUTO: 2.6 % (ref 0.3–6.2)
ERYTHROCYTE [DISTWIDTH] IN BLOOD BY AUTOMATED COUNT: 13.2 % (ref 12.3–15.4)
GLOBULIN UR ELPH-MCNC: 3 GM/DL
GLUCOSE SERPL-MCNC: 106 MG/DL (ref 65–99)
HCT VFR BLD AUTO: 36.6 % (ref 34–46.6)
HGB BLD-MCNC: 12.5 G/DL (ref 12–15.9)
IGG1 SER-MCNC: 1071 MG/DL (ref 700–1600)
IMM GRANULOCYTES # BLD AUTO: 0.04 10*3/MM3 (ref 0–0.05)
IMM GRANULOCYTES NFR BLD AUTO: 0.6 % (ref 0–0.5)
LYMPHOCYTES # BLD AUTO: 1.49 10*3/MM3 (ref 0.7–3.1)
LYMPHOCYTES NFR BLD AUTO: 23 % (ref 19.6–45.3)
MCH RBC QN AUTO: 32.6 PG (ref 26.6–33)
MCHC RBC AUTO-ENTMCNC: 34.2 G/DL (ref 31.5–35.7)
MCV RBC AUTO: 95.6 FL (ref 79–97)
MONOCYTES # BLD AUTO: 0.53 10*3/MM3 (ref 0.1–0.9)
MONOCYTES NFR BLD AUTO: 8.2 % (ref 5–12)
NEUTROPHILS NFR BLD AUTO: 4.18 10*3/MM3 (ref 1.7–7)
NEUTROPHILS NFR BLD AUTO: 64.7 % (ref 42.7–76)
NRBC BLD AUTO-RTO: 0 /100 WBC (ref 0–0.2)
PLATELET # BLD AUTO: 206 10*3/MM3 (ref 140–450)
PMV BLD AUTO: 8.9 FL (ref 6–12)
POTASSIUM SERPL-SCNC: 4.4 MMOL/L (ref 3.5–5.2)
PROT SERPL-MCNC: 7.2 G/DL (ref 6–8.5)
RBC # BLD AUTO: 3.83 10*6/MM3 (ref 3.77–5.28)
SODIUM SERPL-SCNC: 134 MMOL/L (ref 136–145)
WBC NRBC COR # BLD AUTO: 6.47 10*3/MM3 (ref 3.4–10.8)

## 2024-01-05 PROCEDURE — 85025 COMPLETE CBC W/AUTO DIFF WBC: CPT | Performed by: ALLERGY & IMMUNOLOGY

## 2024-01-05 PROCEDURE — 96360 HYDRATION IV INFUSION INIT: CPT

## 2024-01-05 PROCEDURE — 25010000002 HEPARIN LOCK FLUSH PER 10 UNITS: Performed by: ALLERGY & IMMUNOLOGY

## 2024-01-05 PROCEDURE — 96365 THER/PROPH/DIAG IV INF INIT: CPT

## 2024-01-05 PROCEDURE — 96366 THER/PROPH/DIAG IV INF ADDON: CPT

## 2024-01-05 PROCEDURE — 80053 COMPREHEN METABOLIC PANEL: CPT | Performed by: ALLERGY & IMMUNOLOGY

## 2024-01-05 PROCEDURE — 25810000003 SODIUM CHLORIDE 0.9 % SOLUTION: Performed by: ALLERGY & IMMUNOLOGY

## 2024-01-05 PROCEDURE — 82784 ASSAY IGA/IGD/IGG/IGM EACH: CPT | Performed by: ALLERGY & IMMUNOLOGY

## 2024-01-05 PROCEDURE — 25010000002 IMMUNE GLOBULIN (HUMAN) 20 GM/200ML SOLUTION: Performed by: ALLERGY & IMMUNOLOGY

## 2024-01-05 RX ORDER — EPINEPHRINE 1 MG/ML
0.3 INJECTION, SOLUTION INTRAMUSCULAR; SUBCUTANEOUS ONCE AS NEEDED
Start: 2024-02-02

## 2024-01-05 RX ORDER — CLONAZEPAM 0.5 MG/1
0.5 TABLET ORAL NIGHTLY PRN
Qty: 30 TABLET | Refills: 2 | Status: SHIPPED | OUTPATIENT
Start: 2024-01-05

## 2024-01-05 RX ORDER — METHYLPREDNISOLONE SODIUM SUCCINATE 125 MG/2ML
50 INJECTION, POWDER, LYOPHILIZED, FOR SOLUTION INTRAMUSCULAR; INTRAVENOUS ONCE AS NEEDED
Status: DISCONTINUED | OUTPATIENT
Start: 2024-01-05 | End: 2024-01-07 | Stop reason: HOSPADM

## 2024-01-05 RX ORDER — DIPHENHYDRAMINE HCL 25 MG
50 CAPSULE ORAL ONCE AS NEEDED
Status: DISCONTINUED | OUTPATIENT
Start: 2024-01-05 | End: 2024-01-07 | Stop reason: HOSPADM

## 2024-01-05 RX ORDER — PREDNISONE 20 MG/1
40 TABLET ORAL ONCE
Start: 2024-02-02 | End: 2024-02-02

## 2024-01-05 RX ORDER — HEPARIN SODIUM (PORCINE) LOCK FLUSH IV SOLN 100 UNIT/ML 100 UNIT/ML
500 SOLUTION INTRAVENOUS AS NEEDED
OUTPATIENT
Start: 2024-01-05

## 2024-01-05 RX ORDER — ACETAMINOPHEN 325 MG/1
325 TABLET ORAL ONCE
Status: DISCONTINUED | OUTPATIENT
Start: 2024-01-05 | End: 2024-01-07 | Stop reason: HOSPADM

## 2024-01-05 RX ORDER — EPINEPHRINE 1 MG/ML
0.3 INJECTION, SOLUTION INTRAMUSCULAR; SUBCUTANEOUS ONCE AS NEEDED
Status: DISCONTINUED | OUTPATIENT
Start: 2024-01-05 | End: 2024-01-07 | Stop reason: HOSPADM

## 2024-01-05 RX ORDER — SODIUM CHLORIDE 0.9 % (FLUSH) 0.9 %
10 SYRINGE (ML) INJECTION AS NEEDED
Status: DISCONTINUED | OUTPATIENT
Start: 2024-01-05 | End: 2024-01-07 | Stop reason: HOSPADM

## 2024-01-05 RX ORDER — SODIUM CHLORIDE 0.9 % (FLUSH) 0.9 %
10 SYRINGE (ML) INJECTION AS NEEDED
OUTPATIENT
Start: 2024-01-05

## 2024-01-05 RX ORDER — DIPHENHYDRAMINE HCL 25 MG
50 CAPSULE ORAL ONCE
Start: 2024-02-02 | End: 2024-02-02

## 2024-01-05 RX ORDER — PREDNISONE 20 MG/1
40 TABLET ORAL ONCE
Qty: 2 TABLET | Refills: 0 | Status: DISCONTINUED | OUTPATIENT
Start: 2024-01-05 | End: 2024-01-07 | Stop reason: HOSPADM

## 2024-01-05 RX ORDER — PREDNISONE 20 MG/1
40 TABLET ORAL ONCE AS NEEDED
Start: 2024-02-02

## 2024-01-05 RX ORDER — ACETAMINOPHEN 325 MG/1
325 TABLET ORAL ONCE
OUTPATIENT
Start: 2024-02-02

## 2024-01-05 RX ORDER — DIPHENHYDRAMINE HCL 25 MG
50 CAPSULE ORAL ONCE AS NEEDED
Start: 2024-02-02

## 2024-01-05 RX ORDER — DIPHENHYDRAMINE HCL 25 MG
50 CAPSULE ORAL ONCE
Status: DISCONTINUED | OUTPATIENT
Start: 2024-01-05 | End: 2024-01-07 | Stop reason: HOSPADM

## 2024-01-05 RX ORDER — HEPARIN SODIUM (PORCINE) LOCK FLUSH IV SOLN 100 UNIT/ML 100 UNIT/ML
500 SOLUTION INTRAVENOUS AS NEEDED
Status: DISCONTINUED | OUTPATIENT
Start: 2024-01-05 | End: 2024-01-07 | Stop reason: HOSPADM

## 2024-01-05 RX ORDER — PREDNISONE 20 MG/1
40 TABLET ORAL ONCE AS NEEDED
Status: DISCONTINUED | OUTPATIENT
Start: 2024-01-05 | End: 2024-01-07 | Stop reason: HOSPADM

## 2024-01-05 RX ORDER — METHYLPREDNISOLONE SODIUM SUCCINATE 125 MG/2ML
50 INJECTION, POWDER, LYOPHILIZED, FOR SOLUTION INTRAMUSCULAR; INTRAVENOUS ONCE AS NEEDED
Start: 2024-02-02

## 2024-01-05 RX ADMIN — SODIUM CHLORIDE 500 ML: 9 INJECTION, SOLUTION INTRAVENOUS at 08:55

## 2024-01-05 RX ADMIN — Medication 10 ML: at 12:55

## 2024-01-05 RX ADMIN — IMMUNE GLOBULIN INFUSION (HUMAN) 20 G: 100 INJECTION, SOLUTION INTRAVENOUS; SUBCUTANEOUS at 09:28

## 2024-01-05 RX ADMIN — IMMUNE GLOBULIN INFUSION (HUMAN) 20 G: 100 INJECTION, SOLUTION INTRAVENOUS; SUBCUTANEOUS at 11:22

## 2024-01-05 RX ADMIN — HEPARIN 500 UNITS: 100 SYRINGE at 12:55

## 2024-01-09 ENCOUNTER — PATIENT MESSAGE (OUTPATIENT)
Dept: INTERNAL MEDICINE | Facility: CLINIC | Age: 69
End: 2024-01-09
Payer: MEDICARE

## 2024-01-09 DIAGNOSIS — E11.9 TYPE 2 DIABETES MELLITUS WITHOUT COMPLICATION, WITH LONG-TERM CURRENT USE OF INSULIN: ICD-10-CM

## 2024-01-09 DIAGNOSIS — Z79.4 TYPE 2 DIABETES MELLITUS WITHOUT COMPLICATION, WITH LONG-TERM CURRENT USE OF INSULIN: ICD-10-CM

## 2024-01-09 DIAGNOSIS — J32.0 MAXILLARY SINUSITIS, UNSPECIFIED CHRONICITY: ICD-10-CM

## 2024-01-09 NOTE — TELEPHONE ENCOUNTER
From: Raquel Mariee  To: Sanjeev Rawls  Sent: 1/9/2024 8:49 AM EST  Subject: Ubrelvy.     Could I please have more Ubrelvy samples. I do have a Medicare physical scheduled for February.

## 2024-01-10 ENCOUNTER — OFFICE VISIT (OUTPATIENT)
Dept: UROLOGY | Facility: CLINIC | Age: 69
End: 2024-01-10
Payer: MEDICARE

## 2024-01-10 VITALS
SYSTOLIC BLOOD PRESSURE: 118 MMHG | HEIGHT: 62 IN | WEIGHT: 197 LBS | DIASTOLIC BLOOD PRESSURE: 68 MMHG | BODY MASS INDEX: 36.25 KG/M2

## 2024-01-10 DIAGNOSIS — N95.8 GENITOURINARY SYNDROME OF MENOPAUSE: ICD-10-CM

## 2024-01-10 DIAGNOSIS — N39.41 URGE INCONTINENCE OF URINE: Primary | ICD-10-CM

## 2024-01-10 NOTE — PROGRESS NOTES
Office Visit General Established Female Patient     Patient Name: Raquel Mariee  : 1955   MRN: [id number]     Chief Complaint:   Chief Complaint   Patient presents with    Follow-up     6 month UTI/incontinence         Referring Provider: No ref. provider found    History of Present Illness: Raquel Mariee is a 68 y.o. female with history below in assessment, who presents for follow up.   Doing well she has increased her programing by 1 notch but she is having minimal incontinence episodes. She has an [place] and knows soda irritates her bladder  No Utis since our last visit      Subjective      Review of System:   As noted in HPI     Past Medical History:   Past Medical History:   Diagnosis Date    Anxiety     Aortic valve stenosis     Cardiac murmur     Chronic kidney disease     Stage III    Clostridium difficile infection     in her 30s    COPD (chronic obstructive pulmonary disease)     Depression     Diabetes mellitus     type II    Eosinophilic asthma     Fibromyalgia, primary     History of transfusion     in  at [facility].  No reaction noted, patient states she does have an antibody from transfusion.    Hypertension     Hypogammaglobulinemia     Hypothyroidism     Irritable bowel syndrome     Migraines     Morbid obesity     Osteoarthritis     Prinzmetal angina     Pseudomonas infection     PTSD (post-traumatic stress disorder)     Renal insufficiency     Sinus tachycardia     Sleep apnea     no longer uses/needs cpap    Tachycardia     TIA (transient ischemic attack) 2017    Trochanteric bursitis 2023       Past Surgical History:   Past Surgical History:   Procedure Laterality Date    APPENDECTOMY      BUNIONECTOMY Bilateral     CARDIAC CATHETERIZATION      no stents needed    CARPAL TUNNEL RELEASE Right     COLONOSCOPY      ENDOMETRIAL ABLATION      EYE SURGERY  ? About 6-8 years ago    Laser for glaucoma     FRACTURE SURGERY  1995    No hardware; Tibeal plateau    GASTRIC BYPASS      HAND SURGERY Left     No hardware    INTERSTIM PLACEMENT N/A 05/03/2023    Procedure: INTERSTIM STAGES 1 AND 2 LEAD AND GENERATOR PLACEMENT;  Surgeon: Abner Nation MD;  Location: [facility] OR;  Service: Urology;  Laterality: N/A;    POSTERIOR VAGINAL REPAIR N/A 8/2/2023    Procedure: POSTERIOR COLPORRHAPHY;  Surgeon: Kellen Galan MD;  Location: [facility] OR;  Service: Gynecology;  Laterality: N/A;    REPLACEMENT TOTAL KNEE BILATERAL      TEETH EXTRACTION      all of bottom teeth removed    THORACOTOMY      TONSILLECTOMY      TOTAL ABDOMINAL HYSTERECTOMY WITH SALPINGO OOPHORECTOMY      TRACHEAL SURGERY      Repaired via thoracotomy    TUBAL ABDOMINAL LIGATION  1983       Family History:   Family History   Problem Relation Age of Onset    Diabetes Mother     Stroke Mother     Heart disease Mother     Hypertension Mother     Endometriosis Mother     Depression Mother     Diabetes Father     Hypertension Father     Stroke Father     Heart attack Father     Asthma Father     COPD Father     Dementia Father     Heart disease Father     COPD Sister     Asthma Sister     Cancer Sister         Nasal cell skin    Brain cancer Sister     Diabetes Sister     Stroke Sister     Heart attack Sister     Endometriosis Sister     Depression Sister     Arthritis Sister         Rheumatoid    Hypertension Sister     Kidney disease Sister     Diabetes Sister     COPD Sister     Asthma Sister     Endometriosis Sister     Depression Sister     Cancer Sister         Glioma    Heart disease Sister     Heart attack Sister     Endometriosis Sister     Asthma Sister     Hypertension Sister     Kidney disease Sister         ESRD    COPD Brother     Asthma Brother         Turned into COPD    Diabetes Brother     Asthma Brother     COPD Brother     Diabetes Brother     Hypertension Brother     Asthma Daughter     Endometriosis Daughter     Osteoarthritis  Daughter     Hypertension Daughter     Kidney failure Daughter     Irritable bowel syndrome Daughter     Anxiety disorder Daughter     Bipolar disorder Daughter     Depression Daughter     Self-Injurious Behavior  Daughter     Suicide Attempts Daughter     Mental illness Daughter         Bipolar and eating fisorder    Asthma Daughter     Endometriosis Daughter     Osteoarthritis Daughter     Asthma Son     ADD / ADHD Son     Alcohol abuse Son     Drug abuse Son     Breast cancer Maternal Cousin     Heart disease Maternal Grandfather     Heart disease Maternal Uncle     OCD Neg Hx     Paranoid behavior Neg Hx     Schizophrenia Neg Hx     Seizures Neg Hx     Ovarian cancer Neg Hx        Social History:   Social History     Socioeconomic History    Marital status:    Tobacco Use    Smoking status: Never     Passive exposure: Never    Smokeless tobacco: Never   Vaping Use    Vaping Use: Never used   Substance and Sexual Activity    Alcohol use: Not Currently     Comment: ONCE A YEAR    Drug use: Never    Sexual activity: Defer     Birth control/protection: Bilateral salpingectomy , Hysterectomy       Medications:     Current Outpatient Medications:     acetaminophen-codeine (TYLENOL #3) 300-30 MG per tablet, Take 1 tablet by mouth Every 4 (Four) Hours As Needed for Moderate Pain., Disp: 10 tablet, Rfl: 0    albuterol sulfate  (90 Base) MCG/ACT inhaler, Inhale 2 puffs by mouth every 4 to 6 hours as needed for cough, wheeze, shortness of breath. Use with vortex spacer, Disp: 8.5 g, Rfl: 3    allopurinol (ZYLOPRIM) 100 MG tablet, Take 1 tablet by mouth Daily., Disp: 90 tablet, Rfl: 3    azithromycin (Zithromax) 250 MG tablet, Take 2 tablets by mouth the first day, then 1 tablet daily for 4 days., Disp: 6 tablet, Rfl: 0    benzonatate (TESSALON) 200 MG capsule, Take 1 capsule by mouth 3 (Three) Times a Day As Needed for Cough., Disp: 21 capsule, Rfl: 0    betamethasone valerate (VALISONE) 0.1 % cream, Apply  topically to the appropriate area as directed Daily., Disp: 45 g, Rfl: 11    Budeson-Glycopyrrol-Formoterol (Breztri Aerosphere) 160-9-4.8 MCG/ACT aerosol inhaler, Inhale 2 puffs by mouth twice daily. Rinse mouth after use, Disp: 10.7 g, Rfl: 11    buPROPion SR (WELLBUTRIN SR) 150 MG 12 hr tablet, Take 1 tablet by mouth 2 (Two) Times a Day., Disp: 180 tablet, Rfl: 3    calcium carbonate (Antacid Maximum) 1000 MG chewable tablet, Chew 500 mg 3 (Three) Times a Day., Disp: , Rfl:     cholecalciferol (VITAMIN D3) 25 MCG (1000 UT) tablet, Take 1 tablet by mouth Daily., Disp: , Rfl:     clonazePAM (KlonoPIN) 0.5 MG tablet, Take 1 tablet by mouth At Night As Needed, Disp: 30 tablet, Rfl: 2    COVID-19 mRNA Vac-Omid,Pfizer, (Comirnaty) 30 MCG/0.3ML suspension prefilled syringe prefilled syringe, Inject into the appropriate muscle as directed by protocol, Disp: 0.3 mL, Rfl: 0    Dextromethorphan-guaiFENesin (MUCINEX DM PO), Take 2 tablets by mouth 2 (Two) Times a Day., Disp: , Rfl:     dicyclomine (BENTYL) 10 MG capsule, Take 1 capsule by mouth 3 (Three) Times a Day., Disp: 270 capsule, Rfl: 3    diphenhydrAMINE (Benadryl Allergy) 25 MG tablet, Take 1-2 tablets by mouth Every 4-6 Hours As Needed., Disp: 90 tablet, Rfl: 11    doxycycline (MONODOX) 100 MG capsule, Take 1 capsule by mouth 2 (Two) Times a Day., Disp: 14 capsule, Rfl: 0    estradiol (ESTRACE) 0.1 MG/GM vaginal cream, Insert 0.5 grams vaginally twice weekly, Disp: 42.5 g, Rfl: 5    ferrous sulfate 325 (65 FE) MG EC tablet, Take 1 tablet by mouth Daily With Breakfast., Disp: , Rfl:     fexofenadine (ALLEGRA) 180 MG tablet, Take 1 tablet by mouth Daily., Disp: , Rfl:     fluconazole (Diflucan) 100 MG tablet, Take 1 tablet by mouth Daily., Disp: 1 tablet, Rfl: 0    fluticasone (FLONASE) 50 MCG/ACT nasal spray, Instill 1 spray into each nostril as directed by provider Daily., Disp: 16 g, Rfl: 1    furosemide (LASIX) 40 MG tablet, Take 1 tablet by mouth Daily, twice a  month, Disp: 30 tablet, Rfl: 1    glucosamine-chondroitin 500-400 MG capsule capsule, Take 1 capsule by mouth 2 (Two) Times a Day With Meals., Disp: , Rfl:     Hydrocod Gabriel-Chlorphe Gabriel ER (TUSSIONEX PENNKINETIC) 10-8 MG/5ML ER suspension, Take 5 mL by mouth Every 12 (Twelve) Hours As Needed for Cough., Disp: 50 mL, Rfl: 0    Hypertonic Nasal Wash (Sinus Rinse Kit) pack, use once or twice daily as needed, Disp: 1 each, Rfl: 3    immune globulin, human, (Gammagard) 20 GM/200ML solution infusion, Infuse 40 g into a venous catheter Every 28 (Twenty-Eight) Days., Disp: 400 mL, Rfl: 0    Influenza Vac High-Dose Quad (FLUZONE HIGH DOSE) 0.7 ML suspension prefilled syringe injection, Inject 0.7 mL into the appropriate muscle as directed by prescriber., Disp: 0.7 mL, Rfl: 0    ipratropium (ATROVENT) 0.06 % nasal spray, Instill 1-2 sprays each nostril 2-3 times daily as needed, Disp: 15 mL, Rfl: 3    ipratropium-albuterol (DUO-NEB) 0.5-2.5 mg/3 ml nebulizer, Inhale the contents of 1 vial through a nebulizer every 4-6 hours as needed for cough,wheezing and shortness of breath., Disp: 90 mL, Rfl: 3    isosorbide mononitrate (IMDUR) 30 MG 24 hr tablet, Take 1 tablet by mouth Every Morning., Disp: 90 tablet, Rfl: 3    levothyroxine (SYNTHROID, LEVOTHROID) 50 MCG tablet, Take 1 tablet by mouth Daily., Disp: 90 tablet, Rfl: 3    Magnesium 250 MG tablet, Take 2 tablets by mouth 2 (Two) Times a Day., Disp: , Rfl:     montelukast (SINGULAIR) 10 MG tablet, Take 1 tablet by mouth every night at bedtime., Disp: 90 tablet, Rfl: 3    montelukast (SINGULAIR) 10 MG tablet, Take 1 tablet by mouth every night at bedtime., Disp: 30 tablet, Rfl: 11    multivitamin with minerals tablet tablet, Take 1 tablet by mouth Daily., Disp: , Rfl:     nitrofurantoin, macrocrystal-monohydrate, (Macrobid) 100 MG capsule, Take 1 capsule by mouth 2 (Two) Times a Day., Disp: 14 capsule, Rfl: 0    omeprazole (priLOSEC) 40 MG capsule, Take 1 capsule by mouth 2  (Two) Times a Day., Disp: 180 capsule, Rfl: 3    ondansetron (ZOFRAN) 4 MG tablet, Take 1 tablet by mouth Every 8 (Eight) Hours As Needed for Nausea or Vomiting., Disp: 30 tablet, Rfl: 11    ondansetron (ZOFRAN) 4 MG tablet, Take 1 tablet by mouth Every 8 (Eight) Hours As Needed for Nausea or Vomiting., Disp: 30 tablet, Rfl: 11    POTASSIUM CHLORIDE PO, Take 20 mEq by mouth See Admin Instructions. Takes two times a month with Lasix, Disp: , Rfl:     potassium citrate (UROCIT-K) 10 MEQ (1080 MG) CR tablet, Take 1 tablet by mouth Daily., Disp: 90 tablet, Rfl: 3    predniSONE (DELTASONE) 10 MG tablet, Take 6 tabs daily X 3 days, then 4 tabs daily X 3 days, then 3 tabs daily X 3 days, then 2 tabs daily X 3 days, then 1 tab daily X 3 days, then stop, Disp: 48 tablet, Rfl: 0    promethazine-codeine (PHENERGAN with CODEINE) 6.25-10 MG/5ML syrup, Take 5 mL by mouth Every 6 (Six) Hours As Needed (cough)., Disp: 60 mL, Rfl: 0    RSVPreF3 Vac Recomb Adjuvanted (Arexvy) 120 MCG/0.5ML reconstituted suspension injection, Inject 0.5 mL into the appropriate muscle as directed by prescriber., Disp: 1 each, Rfl: 0    Spacer/Aero-Holding Chambers (AeroChamber Plus Wali-Vu) misc, Use as directed with albuterol inhaler, Disp: 1 each, Rfl: 0    Tetanus-Diphth-Acell Pertussis (Boostrix) 5-2.5-18.5 LF-MCG/0.5 injection, Inject  into the appropriate muscle as directed by prescriber., Disp: 0.5 mL, Rfl: 0    tiZANidine (ZANAFLEX) 4 MG tablet, Take 1 tablet by mouth Every Night., Disp: 90 tablet, Rfl: 1    ubrogepant (Ubrelvy) 100 MG tablet, Take 1 tablet by mouth 1 (One) Time As Needed (migraine)., Disp: 8 tablet, Rfl: 0    valsartan (DIOVAN) 160 MG tablet, Take 1 tablet by mouth Daily., Disp: 90 tablet, Rfl: 3    vitamin B-12 (CYANOCOBALAMIN) 1000 MCG tablet, Take 1 tablet by mouth Daily., Disp: , Rfl:     Current Facility-Administered Medications:     heparin injection 500 Units, 5 mL, Intravenous, PRN, Sanjeev Rawls MD    sodium  "chloride 0.9 % flush 10 mL, 10 mL, Intravenous, Q12H, Sanjeev Rawls MD    sodium chloride 0.9 % flush 10 mL, 10 mL, Intravenous, PRN, Sanjeev Rawls MD    sodium chloride 0.9 % flush 20 mL, 20 mL, Intravenous, PRN, Sanjeev Rawls MD    Allergies:   Allergies   Allergen Reactions    Cefaclor Hives and Swelling     Other reaction(s): SWELLING  Shed 4 layers of skin and extremely SOB  Shed 4 layers of skin and extremely SOB      Cephalosporins Hives, Swelling and Angioedema     Rechallenge with cefipime caused rash on 1/14/08.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    Verapamil Nausea And Vomiting     Dizzy    Zolpidem Other (See Comments) and Unknown (See Comments)     Automatic behaviour in sleep.  Automatic behaviour in sleep.  confusion    Cephalexin Rash    Edecrin [Ethacrynic Acid] Rash and Other (See Comments)     Weight gain    Hydrochlorothiazide Hives    Hydrocodone-Acetaminophen GI Intolerance     Tylenol is OK and Codeine is OK.  Pt states problem is just with HYDROcodone.    Sulfa Antibiotics Hives       Objective     Physical Exam:   Vital Signs:   Visit Vitals  /68 (BP Location: Left arm, Patient Position: Sitting, Cuff Size: Adult)   Ht 157.5 cm (62.01\")   Wt 89.4 kg (197 lb)   BMI 36.02 kg/m²      Body mass index is 36.02 kg/m². "     Constitutional: NAD, WDWN.   Neurological: A + O x 3   Psychiatric:  Normal mood and affect    Labs  Brief Urine Lab Results  (Last result in the past 365 days)        Color   Clarity   Blood   Leuk Est   Nitrite   Protein   CREAT   Urine HCG        09/19/23 1405 Yellow   Slightly Cloudy   2+   Moderate (2+)   Negative   Negative                   Lab Results   Component Value Date    GLUCOSE 106 (H) 01/05/2024    CALCIUM 8.9 01/05/2024     (L) 01/05/2024    K 4.4 01/05/2024    CO2 27.8 01/05/2024    CL 98 01/05/2024    BUN 9 01/05/2024    CREATININE 1.06 (H) 01/05/2024    EGFRIFAFRI 50 (L) 06/30/2021    EGFRIFNONA 48 (L) 02/14/2022    BCR 8.5 01/05/2024    ANIONGAP 8.2 01/05/2024       Lab Results   Component Value Date    WBC 6.47 01/05/2024    HGB 12.5 01/05/2024    HCT 36.6 01/05/2024    MCV 95.6 01/05/2024     01/05/2024       Urine Culture          9/19/2023    00:00   Urine Culture   Urine Culture Final report         Radiographic Studies  No Images in the past 120 days found..    I have reviewed the above labs and imaging.     Assessment / Plan      Assessment/Plan:   Diagnoses and all orders for this visit:    1. Urge incontinence of urine (Primary)  -     POC Urinalysis Dipstick, Automated    2. Genitourinary syndrome of menopause    68-year-old female 6 months status post InterStim stage I and II implantation is doing well and satisfied with results.  She has learned she does need some lifestyle modifications try to avoid sodas if possible as this increases her urgency and if she is traveling and waits too long she will still have incontinence.  She is satisfied with programming today and feels comfortable making minor changes if she feels they are necessary.  Will plan on following up yearly or sooner if her incontinence increases.    Program 3 at 1.2 mA  Avoid sodas when possible    Follow Up:   Return in about 1 year (around 1/10/2025) for Follow up Joel- Last wed of the month for  programing.    LILIBETH Cherry, NP-C  [facility] [place]

## 2024-01-19 ENCOUNTER — OFFICE VISIT (OUTPATIENT)
Dept: PULMONOLOGY | Facility: CLINIC | Age: 69
End: 2024-01-19
Payer: MEDICARE

## 2024-01-19 VITALS
DIASTOLIC BLOOD PRESSURE: 82 MMHG | HEART RATE: 79 BPM | BODY MASS INDEX: 39.56 KG/M2 | HEIGHT: 62 IN | SYSTOLIC BLOOD PRESSURE: 144 MMHG | WEIGHT: 215 LBS | OXYGEN SATURATION: 96 %

## 2024-01-19 DIAGNOSIS — I35.0 AORTIC VALVE STENOSIS, ETIOLOGY OF CARDIAC VALVE DISEASE UNSPECIFIED: ICD-10-CM

## 2024-01-19 DIAGNOSIS — J45.50 SEVERE PERSISTENT ASTHMA WITHOUT COMPLICATION: Primary | ICD-10-CM

## 2024-01-19 RX ORDER — PREDNISONE 20 MG/1
40 TABLET ORAL DAILY
Qty: 10 TABLET | Refills: 0 | Status: SHIPPED | OUTPATIENT
Start: 2024-01-19

## 2024-01-19 NOTE — PROGRESS NOTES
Follow Up Office Visit      Patient Name: Raquel Mariee    Chief Complaint:    Chief Complaint   Patient presents with    Breathing Problem       History of Present Illness: Raquel Mariee is a 68 y.o. female who is here today for follow up of asthma.  Since last visit, she has been taking Breztri and finds it helpful, though does still require short acting beta agonist use twice daily.  She reports that she was on Nucala injections previously and did well on them.  She reports having her shingles vaccine 3 years ago.  She has had her COVID, RSV, and flu vaccines already this year.    Context: Climbing stairs  Associated Symptoms: Exertional dyspnea, cough productive of phlegm, no current wheezing, postnasal drainage, no fevers, no hemoptysis, no unintended weight loss  Modifying Factors: Worse with exertion and weather changes, improves with rest and inhalers  Supplemental Oxygen: No  Number of exacerbations per year: 3-4    Subjective      Review of Systems:  Review of Systems   Constitutional:  Negative for fever and unexpected weight change.   Respiratory:  Positive for cough and shortness of breath. Negative for wheezing.    Cardiovascular:  Negative for chest pain and leg swelling.        Past Medical History:   Past Medical History:   Diagnosis Date    Anxiety     Aortic valve stenosis     Cardiac murmur     Chronic kidney disease     Stage III    Clostridium difficile infection     in her 30s    COPD (chronic obstructive pulmonary disease)     Depression     Diabetes mellitus     type II    Eosinophilic asthma     Fibromyalgia, primary     History of transfusion     in 1979 at [facility].  No reaction noted, patient states she does have an antibody from transfusion.    Hypertension     Hypogammaglobulinemia     Hypothyroidism     Irritable bowel syndrome     Migraines     Morbid obesity     Osteoarthritis     Prinzmetal angina 1990    Pseudomonas infection 1998    PTSD  (post-traumatic stress disorder)     Renal insufficiency     Sinus tachycardia 1990    Sleep apnea     no longer uses/needs cpap    Tachycardia     TIA (transient ischemic attack) 2017    Trochanteric bursitis 01/25/2023       Past Surgical History:   Past Surgical History:   Procedure Laterality Date    APPENDECTOMY      BUNIONECTOMY Bilateral     CARDIAC CATHETERIZATION  2017    no stents needed    CARPAL TUNNEL RELEASE Right     COLONOSCOPY  2021    ENDOMETRIAL ABLATION  1997    EYE SURGERY  ? About 6-8 years ago    Laser for glaucoma    FRACTURE SURGERY  1995    No hardware; Tibeal plateau    GASTRIC BYPASS      HAND SURGERY Left     No hardware    INTERSTIM PLACEMENT N/A 05/03/2023    Procedure: INTERSTIM STAGES 1 AND 2 LEAD AND GENERATOR PLACEMENT;  Surgeon: Abner Nation MD;  Location: [facility] OR;  Service: Urology;  Laterality: N/A;    POSTERIOR VAGINAL REPAIR N/A 8/2/2023    Procedure: POSTERIOR COLPORRHAPHY;  Surgeon: Kellen Galan MD;  Location: [facility] OR;  Service: Gynecology;  Laterality: N/A;    REPLACEMENT TOTAL KNEE BILATERAL      TEETH EXTRACTION      all of bottom teeth removed    THORACOTOMY      TONSILLECTOMY      TOTAL ABDOMINAL HYSTERECTOMY WITH SALPINGO OOPHORECTOMY      TRACHEAL SURGERY      Repaired via thoracotomy    TUBAL ABDOMINAL LIGATION  1983       Family History:   Family History   Problem Relation Age of Onset    Diabetes Mother     Stroke Mother     Heart disease Mother     Hypertension Mother     Endometriosis Mother     Depression Mother     Diabetes Father     Hypertension Father     Stroke Father     Heart attack Father     Asthma Father     COPD Father     Dementia Father     Heart disease Father     COPD Sister     Asthma Sister     Cancer Sister         Nasal cell skin    Brain cancer Sister     Diabetes Sister     Stroke Sister     Heart attack Sister     Endometriosis Sister     Depression Sister     Arthritis Sister         Rheumatoid    Hypertension  Sister     Kidney disease Sister     Diabetes Sister     COPD Sister     Asthma Sister     Endometriosis Sister     Depression Sister     Cancer Sister         Glioma    Heart disease Sister     Heart attack Sister     Endometriosis Sister     Asthma Sister     Hypertension Sister     Kidney disease Sister         ESRD    COPD Brother     Asthma Brother         Turned into COPD    Diabetes Brother     Asthma Brother     COPD Brother     Diabetes Brother     Hypertension Brother     Asthma Daughter     Endometriosis Daughter     Osteoarthritis Daughter     Hypertension Daughter     Kidney failure Daughter     Irritable bowel syndrome Daughter     Anxiety disorder Daughter     Bipolar disorder Daughter     Depression Daughter     Self-Injurious Behavior  Daughter     Suicide Attempts Daughter     Mental illness Daughter         Bipolar and eating fisorder    Asthma Daughter     Endometriosis Daughter     Osteoarthritis Daughter     Asthma Son     ADD / ADHD Son     Alcohol abuse Son     Drug abuse Son     Breast cancer Maternal Cousin     Heart disease Maternal Grandfather     Heart disease Maternal Uncle     OCD Neg Hx     Paranoid behavior Neg Hx     Schizophrenia Neg Hx     Seizures Neg Hx     Ovarian cancer Neg Hx        Social History:   Social History     Socioeconomic History    Marital status:    Tobacco Use    Smoking status: Never     Passive exposure: Never    Smokeless tobacco: Never   Vaping Use    Vaping Use: Never used   Substance and Sexual Activity    Alcohol use: Not Currently     Comment: ONCE A YEAR    Drug use: Never    Sexual activity: Defer     Birth control/protection: Bilateral salpingectomy , Hysterectomy       Current Medications:     Current Outpatient Medications:     acetaminophen-codeine (TYLENOL #3) 300-30 MG per tablet, Take 1 tablet by mouth Every 4 (Four) Hours As Needed for Moderate Pain., Disp: 10 tablet, Rfl: 0    albuterol sulfate  (90 Base) MCG/ACT inhaler, Inhale  2 puffs by mouth every 4 to 6 hours as needed for cough, wheeze, shortness of breath. Use with vortex spacer, Disp: 8.5 g, Rfl: 3    allopurinol (ZYLOPRIM) 100 MG tablet, Take 1 tablet by mouth Daily., Disp: 90 tablet, Rfl: 3    benzonatate (TESSALON) 200 MG capsule, Take 1 capsule by mouth 3 (Three) Times a Day As Needed for Cough., Disp: 21 capsule, Rfl: 0    betamethasone valerate (VALISONE) 0.1 % cream, Apply topically to the appropriate area as directed Daily., Disp: 45 g, Rfl: 11    Budeson-Glycopyrrol-Formoterol (Breztri Aerosphere) 160-9-4.8 MCG/ACT aerosol inhaler, Inhale 2 puffs by mouth twice daily. Rinse mouth after use, Disp: 10.7 g, Rfl: 11    buPROPion SR (WELLBUTRIN SR) 150 MG 12 hr tablet, Take 1 tablet by mouth 2 (Two) Times a Day., Disp: 180 tablet, Rfl: 3    calcium carbonate (Antacid Maximum) 1000 MG chewable tablet, Chew 500 mg 3 (Three) Times a Day., Disp: , Rfl:     cholecalciferol (VITAMIN D3) 25 MCG (1000 UT) tablet, Take 1 tablet by mouth Daily., Disp: , Rfl:     clonazePAM (KlonoPIN) 0.5 MG tablet, Take 1 tablet by mouth At Night As Needed, Disp: 30 tablet, Rfl: 2    COVID-19 mRNA Vac-Omid,Pfizer, (Comirnaty) 30 MCG/0.3ML suspension prefilled syringe prefilled syringe, Inject into the appropriate muscle as directed by protocol, Disp: 0.3 mL, Rfl: 0    Dextromethorphan-guaiFENesin (MUCINEX DM PO), Take 2 tablets by mouth 2 (Two) Times a Day., Disp: , Rfl:     dicyclomine (BENTYL) 10 MG capsule, Take 1 capsule by mouth 3 (Three) Times a Day., Disp: 270 capsule, Rfl: 3    diphenhydrAMINE (Benadryl Allergy) 25 MG tablet, Take 1-2 tablets by mouth Every 4-6 Hours As Needed., Disp: 90 tablet, Rfl: 11    estradiol (ESTRACE) 0.1 MG/GM vaginal cream, Insert 0.5 grams vaginally twice weekly, Disp: 42.5 g, Rfl: 5    ferrous sulfate 325 (65 FE) MG EC tablet, Take 1 tablet by mouth Daily With Breakfast., Disp: , Rfl:     fexofenadine (ALLEGRA) 180 MG tablet, Take 1 tablet by mouth Daily., Disp: , Rfl:      fluticasone (FLONASE) 50 MCG/ACT nasal spray, Instill 1 spray into each nostril as directed by provider Daily., Disp: 16 g, Rfl: 1    furosemide (LASIX) 40 MG tablet, Take 1 tablet by mouth Daily, twice a month, Disp: 30 tablet, Rfl: 1    glucosamine-chondroitin 500-400 MG capsule capsule, Take 1 capsule by mouth 2 (Two) Times a Day With Meals., Disp: , Rfl:     Hydrocod Gabriel-Chlorphe Gabriel ER (TUSSIONEX PENNKINETIC) 10-8 MG/5ML ER suspension, Take 5 mL by mouth Every 12 (Twelve) Hours As Needed for Cough., Disp: 50 mL, Rfl: 0    Hypertonic Nasal Wash (Sinus Rinse Kit) pack, use once or twice daily as needed, Disp: 1 each, Rfl: 3    immune globulin, human, (Gammagard) 20 GM/200ML solution infusion, Infuse 40 g into a venous catheter Every 28 (Twenty-Eight) Days., Disp: 400 mL, Rfl: 0    Influenza Vac High-Dose Quad (FLUZONE HIGH DOSE) 0.7 ML suspension prefilled syringe injection, Inject 0.7 mL into the appropriate muscle as directed by prescriber., Disp: 0.7 mL, Rfl: 0    ipratropium (ATROVENT) 0.06 % nasal spray, Instill 1-2 sprays each nostril 2-3 times daily as needed, Disp: 15 mL, Rfl: 3    ipratropium-albuterol (DUO-NEB) 0.5-2.5 mg/3 ml nebulizer, Inhale the contents of 1 vial through a nebulizer every 4-6 hours as needed for cough,wheezing and shortness of breath., Disp: 90 mL, Rfl: 3    isosorbide mononitrate (IMDUR) 30 MG 24 hr tablet, Take 1 tablet by mouth Every Morning., Disp: 90 tablet, Rfl: 3    levothyroxine (SYNTHROID, LEVOTHROID) 50 MCG tablet, Take 1 tablet by mouth Daily., Disp: 90 tablet, Rfl: 3    Magnesium 250 MG tablet, Take 2 tablets by mouth 2 (Two) Times a Day., Disp: , Rfl:     montelukast (SINGULAIR) 10 MG tablet, Take 1 tablet by mouth every night at bedtime., Disp: 90 tablet, Rfl: 3    multivitamin with minerals tablet tablet, Take 1 tablet by mouth Daily., Disp: , Rfl:     omeprazole (priLOSEC) 40 MG capsule, Take 1 capsule by mouth 2 (Two) Times a Day., Disp: 180 capsule, Rfl: 3     ondansetron (ZOFRAN) 4 MG tablet, Take 1 tablet by mouth Every 8 (Eight) Hours As Needed for Nausea or Vomiting., Disp: 30 tablet, Rfl: 11    POTASSIUM CHLORIDE PO, Take 20 mEq by mouth See Admin Instructions. Takes two times a month with Lasix, Disp: , Rfl:     potassium citrate (UROCIT-K) 10 MEQ (1080 MG) CR tablet, Take 1 tablet by mouth Daily., Disp: 90 tablet, Rfl: 3    predniSONE (DELTASONE) 20 MG tablet, Take 2 tablets prior to Gammgard infusion, Disp: 6 tablet, Rfl: 3    RSVPreF3 Vac Recomb Adjuvanted (Arexvy) 120 MCG/0.5ML reconstituted suspension injection, Inject 0.5 mL into the appropriate muscle as directed by prescriber., Disp: 1 each, Rfl: 0    Spacer/Aero-Holding Chambers (AeroChamber Plus Wali-Vu) misc, Use as directed with albuterol inhaler, Disp: 1 each, Rfl: 0    Tetanus-Diphth-Acell Pertussis (Boostrix) 5-2.5-18.5 LF-MCG/0.5 injection, Inject  into the appropriate muscle as directed by prescriber., Disp: 0.5 mL, Rfl: 0    tiZANidine (ZANAFLEX) 4 MG tablet, Take 1 tablet by mouth Every Night., Disp: 90 tablet, Rfl: 1    ubrogepant (Ubrelvy) 100 MG tablet, Take 1 tablet by mouth 1 (One) Time As Needed (migraine)., Disp: 8 tablet, Rfl: 0    valsartan (DIOVAN) 160 MG tablet, Take 1 tablet by mouth Daily., Disp: 90 tablet, Rfl: 3    vitamin B-12 (CYANOCOBALAMIN) 1000 MCG tablet, Take 1 tablet by mouth Daily., Disp: , Rfl:     montelukast (SINGULAIR) 10 MG tablet, Take 1 tablet by mouth every night at bedtime. (Patient not taking: Reported on 1/19/2024), Disp: 30 tablet, Rfl: 11    ondansetron (ZOFRAN) 4 MG tablet, Take 1 tablet by mouth Every 8 (Eight) Hours As Needed for Nausea or Vomiting. (Patient not taking: Reported on 1/19/2024), Disp: 30 tablet, Rfl: 11    Current Facility-Administered Medications:     heparin injection 500 Units, 5 mL, Intravenous, PRN, Sanjeev Rawls MD    sodium chloride 0.9 % flush 10 mL, 10 mL, Intravenous, Q12H, Sanjeev Rawls MD    sodium chloride 0.9 % flush 10  "mL, 10 mL, Intravenous, PRN, Sanjeev Rwals MD    sodium chloride 0.9 % flush 20 mL, 20 mL, Intravenous, PRN, Sanjeev Rawls MD     Allergies:   Allergies   Allergen Reactions    Cefaclor Hives and Swelling     Other reaction(s): SWELLING  Shed 4 layers of skin and extremely SOB  Shed 4 layers of skin and extremely SOB      Cephalosporins Hives, Swelling and Angioedema     Rechallenge with cefipime caused rash on 1/14/08.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    Verapamil Nausea And Vomiting     Dizzy    Zolpidem Other (See Comments) and Unknown (See Comments)     Automatic behaviour in sleep.  Automatic behaviour in sleep.  confusion    Cephalexin Rash    Edecrin [Ethacrynic Acid] Rash and Other (See Comments)     Weight gain    Hydrochlorothiazide Hives    Hydrocodone-Acetaminophen GI Intolerance     Tylenol is OK and Codeine is OK.  Pt states problem is just with HYDROcodone.    Sulfa Antibiotics Hives       Objective     Physical Exam:  Vital Signs:   Vitals:    01/19/24 0854   BP: 144/82   Pulse: 79   SpO2: 96%   Weight: 97.5 kg (215 lb)   Height: 157.5 cm (62\")     Body mass index is 39.32 kg/m².    Physical Exam  Vitals reviewed.   Constitutional:       General: She is not in acute distress.     Appearance: She is not toxic-appearing.   HENT:     "  Head: Normocephalic and atraumatic.      Mouth/Throat:      Mouth: Mucous membranes are moist.   Eyes:      Conjunctiva/sclera: Conjunctivae normal.   Cardiovascular:      Rate and Rhythm: Normal rate.      Heart sounds: Murmur heard.   Pulmonary:      Effort: Pulmonary effort is normal.      Comments: Scattered expiratory wheezing  Abdominal:      General: There is no distension.      Palpations: Abdomen is soft.   Musculoskeletal:         General: No swelling.      Cervical back: Neck supple.   Skin:     General: Skin is warm and dry.      Findings: No rash.   Neurological:      General: No focal deficit present.      Mental Status: She is alert and oriented to person, place, and time.   Psychiatric:         Mood and Affect: Mood normal.         Behavior: Behavior normal.       Results Review:   Results for orders placed in visit on 09/13/23    Adult Transthoracic Echo Complete W/ Cont if Necessary Per Protocol    Interpretation Summary    Left ventricular systolic function is normal. Left ventricular ejection fraction appears to be 66 - 70%.    Left ventricular wall thickness is consistent with mild concentric hypertrophy.    Left ventricular diastolic function is consistent with (grade II w/high LAP) pseudonormalization.    The left atrial cavity is mildly dilated.    Left atrial volume is moderately increased.    Moderate aortic valve stenosis is present.    Estimated right ventricular systolic pressure from tricuspid regurgitation is normal (<35 mmHg).    WBC   Date Value Ref Range Status   01/05/2024 6.47 3.40 - 10.80 10*3/mm3 Final   03/22/2022 4.75 3.40 - 10.80 10*3/mm3 Final   06/05/2020 4.3 4.2 - 11.0 K/mcL Final     RBC   Date Value Ref Range Status   01/05/2024 3.83 3.77 - 5.28 10*6/mm3 Final   03/22/2022 4.14 3.77 - 5.28 10*6/mm3 Final     Hemoglobin   Date Value Ref Range Status   01/05/2024 12.5 12.0 - 15.9 g/dL Final   06/05/2020 10.7 (L) 12.0 - 15.5 g/dL Final     Hematocrit   Date Value Ref Range  Status   01/05/2024 36.6 34.0 - 46.6 % Final   06/05/2020 30.5 (L) 36.0 - 46.5 % Final     MCV   Date Value Ref Range Status   01/05/2024 95.6 79.0 - 97.0 fL Final   06/05/2020 88.2 78.0 - 100.0 fl Final     MCH   Date Value Ref Range Status   01/05/2024 32.6 26.6 - 33.0 pg Final   06/05/2020 30.9 26.0 - 34.0 pg Final     MCHC   Date Value Ref Range Status   01/05/2024 34.2 31.5 - 35.7 g/dL Final   06/05/2020 35.1 32.0 - 36.5 g/dL Final     RDW   Date Value Ref Range Status   01/05/2024 13.2 12.3 - 15.4 % Final   12/16/2019 3.55 (L) 4.00 - 5.20 mil/mcL Final   12/16/2019 13.0 11.0 - 15.0 % Final     RDW-SD   Date Value Ref Range Status   01/05/2024 46.8 37.0 - 54.0 fl Final   06/05/2020 38.2 (L) 39.0 - 50.0 fL Final     MPV   Date Value Ref Range Status   01/05/2024 8.9 6.0 - 12.0 fL Final     Platelets   Date Value Ref Range Status   01/05/2024 206 140 - 450 10*3/mm3 Final   06/05/2020 200 140 - 450 K/mcL Final     Neutrophil Rel %   Date Value Ref Range Status   12/16/2019 86 % Final     Neutrophil %   Date Value Ref Range Status   01/05/2024 64.7 42.7 - 76.0 % Final   06/05/2020 49 % Final     Lymphocyte Rel %   Date Value Ref Range Status   12/16/2019 8 % Final     Lymphocyte %   Date Value Ref Range Status   01/05/2024 23.0 19.6 - 45.3 % Final   06/05/2020 36 % Final     Monocyte Rel %   Date Value Ref Range Status   06/05/2020 12 % Final   12/16/2019 5 % Final     Monocyte %   Date Value Ref Range Status   01/05/2024 8.2 5.0 - 12.0 % Final     Eosinophil Rel %   Date Value Ref Range Status   12/16/2019 1 % Final     Eosinophil %   Date Value Ref Range Status   01/05/2024 2.6 0.3 - 6.2 % Final     Basophil Rel %   Date Value Ref Range Status   06/05/2020 1 % Final   12/16/2019 0 % Final     Basophil %   Date Value Ref Range Status   01/05/2024 0.9 0.0 - 1.5 % Final     Immature Grans %   Date Value Ref Range Status   01/05/2024 0.6 (H) 0.0 - 0.5 % Final   06/05/2020 1 % Final     Neutrophils Absolute   Date  Value Ref Range Status   12/16/2019 5.6 1.8 - 7.7 K/mcL Final     Neutrophils, Absolute   Date Value Ref Range Status   01/05/2024 4.18 1.70 - 7.00 10*3/mm3 Final   06/05/2020 2.2 1.8 - 7.7 K/mcL Final     Lymphocytes Absolute   Date Value Ref Range Status   12/16/2019 0.5 (L) 1.0 - 4.0 K/mcL Final     Lymphocytes, Absolute   Date Value Ref Range Status   01/05/2024 1.49 0.70 - 3.10 10*3/mm3 Final   06/05/2020 1.5 1.0 - 4.0 K/mcL Final     Monocytes Absolute   Date Value Ref Range Status   06/05/2020 0.5 0.3 - 0.9 K/mcL Final   12/16/2019 0.3 0.3 - 0.9 K/mcL Final     Monocytes, Absolute   Date Value Ref Range Status   01/05/2024 0.53 0.10 - 0.90 10*3/mm3 Final     Eosinophils Absolute   Date Value Ref Range Status   06/05/2020 0.0 (L) 0.1 - 0.5 K/mcL Final   12/16/2019 0.0 (L) 0.1 - 0.5 K/mcL Final     Eosinophils, Absolute   Date Value Ref Range Status   01/05/2024 0.17 0.00 - 0.40 10*3/mm3 Final     Basophils Absolute   Date Value Ref Range Status   12/16/2019 0.0 0.0 - 0.3 K/mcL Final     Basophils, Absolute   Date Value Ref Range Status   01/05/2024 0.06 0.00 - 0.20 10*3/mm3 Final   06/05/2020 0.0 0.0 - 0.3 K/mcL Final     Immature Grans, Absolute   Date Value Ref Range Status   01/05/2024 0.04 0.00 - 0.05 10*3/mm3 Final   06/05/2020 0.0 0.0 - 0.2 K/mcL Final   06/15/2019 0.0 0 - 0.2 K/mcl Final     nRBC   Date Value Ref Range Status   01/05/2024 0.0 0.0 - 0.2 /100 WBC Final     Assessment / Plan      Assessment/Plan:   Diagnoses and all orders for this visit:    1. Severe persistent asthma without complication (Primary)  -     Ambulatory Referral to Disease State Management  -     predniSONE (DELTASONE) 20 MG tablet; Take 2 tablets by mouth Daily.  Dispense: 10 tablet; Refill: 0  Patient with severe persistent asthma who has frequent exacerbations and has symptoms that are not optimally controlled despite use of high-dose inhaled corticosteroid.  Will send in a course of prednisone for patient to have on hand  in the event that she exacerbates again.  Patient will be referred to the [facility] to be started on an asthma biologic agent.    2.  Aortic stenosis  Patient advised to continue following with cardiology.    Follow Up:   Return in about 6 months (around 7/19/2024) for Recheck.  The patient was counseled on diagnostic results, risks and benefits of treatment options, risk factor modifications and the importance of treatment compliance. The patient was advised to contact the clinic with concerns or worsening symptoms.     LILIBETH Hooker   [facility]

## 2024-01-24 ENCOUNTER — PATIENT MESSAGE (OUTPATIENT)
Dept: INTERNAL MEDICINE | Facility: CLINIC | Age: 69
End: 2024-01-24
Payer: MEDICARE

## 2024-01-24 RX ORDER — VALSARTAN 160 MG/1
160 TABLET ORAL DAILY
Qty: 90 TABLET | Refills: 3 | Status: CANCELLED | OUTPATIENT
Start: 2024-01-24

## 2024-01-25 RX ORDER — VALSARTAN 160 MG/1
160 TABLET ORAL DAILY
Qty: 90 TABLET | Refills: 3 | Status: SHIPPED | OUTPATIENT
Start: 2024-01-25

## 2024-01-25 NOTE — TELEPHONE ENCOUNTER
From: Raquel Mariee  To: Sanjeev Rawls  Sent: 1/24/2024 5:25 PM EST  Subject: Valsartan    I need more refills. I see you for Medicare physical in February. Thank you, Lilian

## 2024-02-02 ENCOUNTER — HOSPITAL ENCOUNTER (OUTPATIENT)
Dept: INFUSION THERAPY | Facility: HOSPITAL | Age: 69
Discharge: HOME | End: 2024-02-02
Payer: MEDICARE

## 2024-02-02 VITALS
TEMPERATURE: 97.9 F | OXYGEN SATURATION: 94 % | DIASTOLIC BLOOD PRESSURE: 77 MMHG | SYSTOLIC BLOOD PRESSURE: 141 MMHG | HEART RATE: 78 BPM

## 2024-02-02 DIAGNOSIS — D80.1 COMMON VARIABLE AGAMMAGLOBULINEMIA: Primary | ICD-10-CM

## 2024-02-02 DIAGNOSIS — Z95.828 PORT-A-CATH IN PLACE: ICD-10-CM

## 2024-02-02 PROCEDURE — 25010000002 IMMUNE GLOBULIN (HUMAN) 20 GM/200ML SOLUTION: Performed by: ALLERGY & IMMUNOLOGY

## 2024-02-02 PROCEDURE — 25810000003 SODIUM CHLORIDE 0.9 % SOLUTION: Performed by: ALLERGY & IMMUNOLOGY

## 2024-02-02 PROCEDURE — 96361 HYDRATE IV INFUSION ADD-ON: CPT

## 2024-02-02 PROCEDURE — 25010000002 HEPARIN LOCK FLUSH PER 10 UNITS: Performed by: ALLERGY & IMMUNOLOGY

## 2024-02-02 PROCEDURE — 96366 THER/PROPH/DIAG IV INF ADDON: CPT

## 2024-02-02 PROCEDURE — 96365 THER/PROPH/DIAG IV INF INIT: CPT

## 2024-02-02 RX ORDER — DIPHENHYDRAMINE HCL 25 MG
50 CAPSULE ORAL ONCE
Status: DISCONTINUED | OUTPATIENT
Start: 2024-02-02 | End: 2024-02-04 | Stop reason: HOSPADM

## 2024-02-02 RX ORDER — DIPHENHYDRAMINE HCL 25 MG
50 CAPSULE ORAL ONCE AS NEEDED
Status: DISCONTINUED | OUTPATIENT
Start: 2024-02-02 | End: 2024-02-04 | Stop reason: HOSPADM

## 2024-02-02 RX ORDER — HEPARIN SODIUM (PORCINE) LOCK FLUSH IV SOLN 100 UNIT/ML 100 UNIT/ML
500 SOLUTION INTRAVENOUS AS NEEDED
Status: DISCONTINUED | OUTPATIENT
Start: 2024-02-02 | End: 2024-02-04 | Stop reason: HOSPADM

## 2024-02-02 RX ORDER — PREDNISONE 20 MG/1
40 TABLET ORAL ONCE
Status: DISCONTINUED | OUTPATIENT
Start: 2024-02-02 | End: 2024-02-04 | Stop reason: HOSPADM

## 2024-02-02 RX ORDER — METHYLPREDNISOLONE SODIUM SUCCINATE 125 MG/2ML
50 INJECTION, POWDER, LYOPHILIZED, FOR SOLUTION INTRAMUSCULAR; INTRAVENOUS ONCE AS NEEDED
Start: 2024-03-01

## 2024-02-02 RX ORDER — METHYLPREDNISOLONE SODIUM SUCCINATE 125 MG/2ML
50 INJECTION, POWDER, LYOPHILIZED, FOR SOLUTION INTRAMUSCULAR; INTRAVENOUS ONCE AS NEEDED
Status: DISCONTINUED | OUTPATIENT
Start: 2024-02-02 | End: 2024-02-04 | Stop reason: HOSPADM

## 2024-02-02 RX ORDER — SODIUM CHLORIDE 0.9 % (FLUSH) 0.9 %
10 SYRINGE (ML) INJECTION AS NEEDED
OUTPATIENT
Start: 2024-02-02

## 2024-02-02 RX ORDER — PREDNISONE 20 MG/1
40 TABLET ORAL ONCE
Start: 2024-03-01 | End: 2024-03-01

## 2024-02-02 RX ORDER — ACETAMINOPHEN 325 MG/1
325 TABLET ORAL ONCE
OUTPATIENT
Start: 2024-03-01

## 2024-02-02 RX ORDER — PREDNISONE 20 MG/1
40 TABLET ORAL ONCE AS NEEDED
Start: 2024-03-01

## 2024-02-02 RX ORDER — SODIUM CHLORIDE 0.9 % (FLUSH) 0.9 %
10 SYRINGE (ML) INJECTION AS NEEDED
Status: DISCONTINUED | OUTPATIENT
Start: 2024-02-02 | End: 2024-02-04 | Stop reason: HOSPADM

## 2024-02-02 RX ORDER — EPINEPHRINE 1 MG/ML
0.3 INJECTION, SOLUTION INTRAMUSCULAR; SUBCUTANEOUS ONCE AS NEEDED
Start: 2024-03-01

## 2024-02-02 RX ORDER — EPINEPHRINE 1 MG/ML
0.3 INJECTION, SOLUTION INTRAMUSCULAR; SUBCUTANEOUS ONCE AS NEEDED
Status: DISCONTINUED | OUTPATIENT
Start: 2024-02-02 | End: 2024-02-04 | Stop reason: HOSPADM

## 2024-02-02 RX ORDER — DIPHENHYDRAMINE HCL 25 MG
50 CAPSULE ORAL ONCE
Start: 2024-03-01 | End: 2024-03-01

## 2024-02-02 RX ORDER — HEPARIN SODIUM (PORCINE) LOCK FLUSH IV SOLN 100 UNIT/ML 100 UNIT/ML
500 SOLUTION INTRAVENOUS AS NEEDED
OUTPATIENT
Start: 2024-02-02

## 2024-02-02 RX ORDER — ACETAMINOPHEN 325 MG/1
325 TABLET ORAL ONCE
Status: DISCONTINUED | OUTPATIENT
Start: 2024-02-02 | End: 2024-02-04 | Stop reason: HOSPADM

## 2024-02-02 RX ORDER — DIPHENHYDRAMINE HCL 25 MG
50 CAPSULE ORAL ONCE AS NEEDED
Start: 2024-03-01

## 2024-02-02 RX ORDER — PREDNISONE 20 MG/1
40 TABLET ORAL ONCE AS NEEDED
Status: DISCONTINUED | OUTPATIENT
Start: 2024-02-02 | End: 2024-02-04 | Stop reason: HOSPADM

## 2024-02-02 RX ADMIN — IMMUNE GLOBULIN INFUSION (HUMAN) 20 G: 100 INJECTION, SOLUTION INTRAVENOUS; SUBCUTANEOUS at 11:20

## 2024-02-02 RX ADMIN — Medication 10 ML: at 13:12

## 2024-02-02 RX ADMIN — IMMUNE GLOBULIN INFUSION (HUMAN) 20 G: 100 INJECTION, SOLUTION INTRAVENOUS; SUBCUTANEOUS at 09:31

## 2024-02-02 RX ADMIN — SODIUM CHLORIDE 500 ML: 9 INJECTION, SOLUTION INTRAVENOUS at 08:51

## 2024-02-02 RX ADMIN — HEPARIN 500 UNITS: 100 SYRINGE at 13:12

## 2024-02-06 ENCOUNTER — HOSPITAL ENCOUNTER (OUTPATIENT)
Dept: GENERAL RADIOLOGY | Facility: HOSPITAL | Age: 69
Discharge: HOME | End: 2024-02-06
Admitting: INTERNAL MEDICINE
Payer: MEDICARE

## 2024-02-06 ENCOUNTER — OFFICE VISIT (OUTPATIENT)
Dept: INTERNAL MEDICINE | Facility: CLINIC | Age: 69
End: 2024-02-06
Payer: MEDICARE

## 2024-02-06 VITALS
BODY MASS INDEX: 34.16 KG/M2 | TEMPERATURE: 98.3 F | OXYGEN SATURATION: 99 % | DIASTOLIC BLOOD PRESSURE: 84 MMHG | HEIGHT: 65 IN | HEART RATE: 83 BPM | SYSTOLIC BLOOD PRESSURE: 132 MMHG | WEIGHT: 205 LBS | RESPIRATION RATE: 16 BRPM

## 2024-02-06 DIAGNOSIS — M25.551 BILATERAL HIP PAIN: ICD-10-CM

## 2024-02-06 DIAGNOSIS — Z00.00 ROUTINE GENERAL MEDICAL EXAMINATION AT A HEALTH CARE FACILITY: ICD-10-CM

## 2024-02-06 DIAGNOSIS — Z79.4 TYPE 2 DIABETES MELLITUS WITHOUT COMPLICATION, WITH LONG-TERM CURRENT USE OF INSULIN: ICD-10-CM

## 2024-02-06 DIAGNOSIS — E11.9 TYPE 2 DIABETES MELLITUS WITHOUT COMPLICATION, WITH LONG-TERM CURRENT USE OF INSULIN: ICD-10-CM

## 2024-02-06 DIAGNOSIS — Z78.0 POSTMENOPAUSAL: ICD-10-CM

## 2024-02-06 DIAGNOSIS — J32.0 MAXILLARY SINUSITIS, UNSPECIFIED CHRONICITY: ICD-10-CM

## 2024-02-06 DIAGNOSIS — M25.552 BILATERAL HIP PAIN: ICD-10-CM

## 2024-02-06 DIAGNOSIS — G43.809 OTHER MIGRAINE WITHOUT STATUS MIGRAINOSUS, NOT INTRACTABLE: Primary | ICD-10-CM

## 2024-02-06 DIAGNOSIS — E55.9 VITAMIN D DEFICIENCY, UNSPECIFIED: ICD-10-CM

## 2024-02-06 PROCEDURE — 73521 X-RAY EXAM HIPS BI 2 VIEWS: CPT

## 2024-02-06 RX ORDER — ERENUMAB-AOOE 140 MG/ML
140 INJECTION, SOLUTION SUBCUTANEOUS
Qty: 1 ML | Refills: 5 | Status: SHIPPED | OUTPATIENT
Start: 2024-02-06

## 2024-02-06 NOTE — PROGRESS NOTES
The ABCs of the Annual Wellness Visit  Subsequent Medicare Wellness Visit    Kim Mariee is a 68 y.o. female who presents for a Subsequent Medicare Wellness Visit.    The following portions of the patient's history were reviewed and   updated as appropriate: allergies, current medications, past family history, past medical history, past social history, past surgical history, and problem list.    Compared to one year ago, the patient feels her physical   health is the same.    Compared to one year ago, the patient feels her mental   health is the same.    Recent Hospitalizations:  This patient has had a [facility] admission record on file within the last 365 days.    Current Medical Providers:  Patient Care Team:  Sanjeev Rawls MD as PCP - General (Internal Medicine)  Dandre Amador MD as Consulting Physician (Gastroenterology)    Outpatient Medications Prior to Visit   Medication Sig Dispense Refill    acetaminophen-codeine (TYLENOL #3) 300-30 MG per tablet Take 1 tablet by mouth Every 4 (Four) Hours As Needed for Moderate Pain. 10 tablet 0    albuterol sulfate  (90 Base) MCG/ACT inhaler Inhale 2 puffs by mouth every 4 to 6 hours as needed for cough, wheeze, shortness of breath. Use with vortex spacer 8.5 g 3    allopurinol (ZYLOPRIM) 100 MG tablet Take 1 tablet by mouth Daily. 90 tablet 3    benzonatate (TESSALON) 200 MG capsule Take 1 capsule by mouth 3 (Three) Times a Day As Needed for Cough. 21 capsule 0    betamethasone valerate (VALISONE) 0.1 % cream Apply topically to the appropriate area as directed Daily. 45 g 11    Budeson-Glycopyrrol-Formoterol (Breztri Aerosphere) 160-9-4.8 MCG/ACT aerosol inhaler Inhale 2 puffs by mouth twice daily. Rinse mouth after use 10.7 g 11    buPROPion SR (WELLBUTRIN SR) 150 MG 12 hr tablet Take 1 tablet by mouth 2 (Two) Times a Day. 180 tablet 3    calcium carbonate (Antacid Maximum) 1000 MG chewable tablet Chew 500 mg 3 (Three)  Times a Day.      cholecalciferol (VITAMIN D3) 25 MCG (1000 UT) tablet Take 1 tablet by mouth Daily.      clonazePAM (KlonoPIN) 0.5 MG tablet Take 1 tablet by mouth At Night As Needed 30 tablet 2    COVID-19 mRNA Vac-Omid,Pfizer, (Comirnaty) 30 MCG/0.3ML suspension prefilled syringe prefilled syringe Inject into the appropriate muscle as directed by protocol 0.3 mL 0    Dextromethorphan-guaiFENesin (MUCINEX DM PO) Take 2 tablets by mouth 2 (Two) Times a Day.      dicyclomine (BENTYL) 10 MG capsule Take 1 capsule by mouth 3 (Three) Times a Day. 270 capsule 3    diphenhydrAMINE (Benadryl Allergy) 25 MG tablet Take 1-2 tablets by mouth Every 4-6 Hours As Needed. 90 tablet 11    estradiol (ESTRACE) 0.1 MG/GM vaginal cream Insert 0.5 grams vaginally twice weekly 42.5 g 5    ferrous sulfate 325 (65 FE) MG EC tablet Take 1 tablet by mouth Daily With Breakfast.      fexofenadine (ALLEGRA) 180 MG tablet Take 1 tablet by mouth Daily.      fluticasone (FLONASE) 50 MCG/ACT nasal spray Instill 1 spray into each nostril as directed by provider Daily. 16 g 1    furosemide (LASIX) 40 MG tablet Take 1 tablet by mouth Daily, twice a month 30 tablet 1    glucosamine-chondroitin 500-400 MG capsule capsule Take 1 capsule by mouth 2 (Two) Times a Day With Meals.      Hydrocod Gabriel-Chlorphe Gabriel ER (TUSSIONEX PENNKINETIC) 10-8 MG/5ML ER suspension Take 5 mL by mouth Every 12 (Twelve) Hours As Needed for Cough. 50 mL 0    Hypertonic Nasal Wash (Sinus Rinse Kit) pack use once or twice daily as needed 1 each 3    immune globulin, human, (Gammagard) 20 GM/200ML solution infusion Infuse 40 g into a venous catheter Every 28 (Twenty-Eight) Days. 400 mL 0    Influenza Vac High-Dose Quad (FLUZONE HIGH DOSE) 0.7 ML suspension prefilled syringe injection Inject 0.7 mL into the appropriate muscle as directed by prescriber. 0.7 mL 0    ipratropium (ATROVENT) 0.06 % nasal spray Instill 1-2 sprays each nostril 2-3 times daily as needed 15 mL 3     ipratropium-albuterol (DUO-NEB) 0.5-2.5 mg/3 ml nebulizer Inhale the contents of 1 vial through a nebulizer every 4-6 hours as needed for cough,wheezing and shortness of breath. 90 mL 3    isosorbide mononitrate (IMDUR) 30 MG 24 hr tablet Take 1 tablet by mouth Every Morning. 90 tablet 3    levothyroxine (SYNTHROID, LEVOTHROID) 50 MCG tablet Take 1 tablet by mouth Daily. 90 tablet 3    Magnesium 250 MG tablet Take 2 tablets by mouth 2 (Two) Times a Day.      montelukast (SINGULAIR) 10 MG tablet Take 1 tablet by mouth every night at bedtime. 90 tablet 3    montelukast (SINGULAIR) 10 MG tablet Take 1 tablet by mouth every night at bedtime. 30 tablet 11    multivitamin with minerals tablet tablet Take 1 tablet by mouth Daily.      omeprazole (priLOSEC) 40 MG capsule Take 1 capsule by mouth 2 (Two) Times a Day. 180 capsule 3    ondansetron (ZOFRAN) 4 MG tablet Take 1 tablet by mouth Every 8 (Eight) Hours As Needed for Nausea or Vomiting. 30 tablet 11    ondansetron (ZOFRAN) 4 MG tablet Take 1 tablet by mouth Every 8 (Eight) Hours As Needed for Nausea or Vomiting. 30 tablet 11    POTASSIUM CHLORIDE PO Take 20 mEq by mouth See Admin Instructions. Takes two times a month with Lasix      potassium citrate (UROCIT-K) 10 MEQ (1080 MG) CR tablet Take 1 tablet by mouth Daily. 90 tablet 3    predniSONE (DELTASONE) 20 MG tablet Take 2 tablets prior to Gammgard infusion 6 tablet 3    predniSONE (DELTASONE) 20 MG tablet Take 2 tablets by mouth Daily. 10 tablet 0    RSVPreF3 Vac Recomb Adjuvanted (Arexvy) 120 MCG/0.5ML reconstituted suspension injection Inject 0.5 mL into the appropriate muscle as directed by prescriber. 1 each 0    Spacer/Aero-Holding Chambers (AeroChamber Plus Wali-Vu) misc Use as directed with albuterol inhaler 1 each 0    Tetanus-Diphth-Acell Pertussis (Boostrix) 5-2.5-18.5 LF-MCG/0.5 injection Inject  into the appropriate muscle as directed by prescriber. 0.5 mL 0    tiZANidine (ZANAFLEX) 4 MG tablet Take 1  tablet by mouth Every Night. 90 tablet 1    valsartan (DIOVAN) 160 MG tablet Take 1 tablet by mouth Daily. 90 tablet 3    vitamin B-12 (CYANOCOBALAMIN) 1000 MCG tablet Take 1 tablet by mouth Daily.      ubrogepant (Ubrelvy) 100 MG tablet Take 1 tablet by mouth 1 (One) Time As Needed (migraine). 8 tablet 0     Facility-Administered Medications Prior to Visit   Medication Dose Route Frequency Provider Last Rate Last Admin    heparin injection 500 Units  5 mL Intravenous PRN Sanjeev Rawls MD        sodium chloride 0.9 % flush 10 mL  10 mL Intravenous Q12H Sanjeev Rawls MD        sodium chloride 0.9 % flush 10 mL  10 mL Intravenous PRN Sanjeev Rawls MD        sodium chloride 0.9 % flush 20 mL  20 mL Intravenous PRN Sanjeev Rawls MD           Opioid medication/s are on active medication list.  and I have evaluated her active treatment plan and pain score trends (see table).  Vitals:    02/06/24 0956   PainSc: 0-No pain     I have reviewed the chart for potential of high risk medication and harmful drug interactions in the elderly.          Aspirin is not on active medication list.  Aspirin use is not indicated based on review of current medical condition/s. Risk of harm outweighs potential benefits.  .    Patient Active Problem List   Diagnosis    Hypogammaglobulinemia    Gastric bypass status for obesity    CKD (chronic kidney disease), stage III    KALYN (obstructive sleep apnea)    Major depressive disorder in partial remission    Hypothyroidism    Fibromyalgia syndrome    Abdominal aortic aneurysm (AAA) without rupture    Nonrheumatic aortic valve stenosis    Vitamin D deficiency, unspecified     Anatomical narrow angle borderline glaucoma    Abnormal finding of blood chemistry, unspecified     Chronic diastolic heart failure    Prinzmetal angina    Common variable agammaglobulinemia    Polymyositis    Port-A-Cath in place    Abnormal CT of the chest    Acute bronchospasm    Acute kidney injury     "Sinusitis, chronic    Anemia    Aortic valve stenosis and insufficiency, rheumatic    Body mass index (BMI) of 40.0 to 44.9 in adult    Calcium kidney stones    Choroidal nevus of left eye    CAD (coronary artery disease)    Claw toe, acquired    Combined forms of age-related cataract of both eyes    Depressive disorder    Essential hypertension with goal blood pressure less than 140/90    Severe persistent asthma with exacerbation    Glaucoma suspect of both eyes    Inappropriate sinus tachycardia    Migraine without aura    Mitral valve regurgitation    Osteoarthrosis involving lower leg    Pain in joint involving lower leg    Posterior vitreous detachment of both eyes    Primary osteoarthritis of left wrist    Anxiety disorder    Status post total knee replacement    Sleep disturbances    Senile nuclear sclerosis    Rotator cuff syndrome of left shoulder    Pure hypercholesterolemia    Thoracic aortic aneurysm without rupture    TIA (transient ischemic attack)    Tracheomalacia    Trochlear nerve palsy, left    Diabetes mellitus without complication    Urge incontinence of urine    Acute exacerbation of chronic obstructive pulmonary disease (COPD)     Advance Care Planning   Advance Care Planning     Advance Directive is on file.  ACP discussion was held with the patient during this visit. Patient has an advance directive in EMR which is still valid.      Objective    Vitals:    02/06/24 0956   BP: 132/84   Pulse: 83   Resp: 16   Temp: 98.3 °F (36.8 °C)   SpO2: 99%   Weight: 93 kg (205 lb)   Height: 165.1 cm (65\")   PainSc: 0-No pain     Estimated body mass index is 34.11 kg/m² as calculated from the following:    Height as of this encounter: 165.1 cm (65\").    Weight as of this encounter: 93 kg (205 lb).    BMI is >= 30 and <35. (Class 1 Obesity). The following options were offered after discussion;: weight loss educational material (shared in after visit summary), exercise counseling/recommendations, and " nutrition counseling/recommendations      Does the patient have evidence of cognitive impairment?   No            HEALTH RISK ASSESSMENT    Smoking Status:  Social History     Tobacco Use   Smoking Status Never    Passive exposure: Never   Smokeless Tobacco Never     Alcohol Consumption:  Social History     Substance and Sexual Activity   Alcohol Use Not Currently    Comment: ONCE A YEAR     Fall Risk Screen:    GUILLAUME Fall Risk Assessment was completed, and patient is at MODERATE risk for falls. Assessment completed on:2024    Depression Screenin/6/2024     9:59 AM   PHQ-2/PHQ-9 Depression Screening   Little Interest or Pleasure in Doing Things 0-->not at all   Feeling Down, Depressed or Hopeless 1-->several days   PHQ-9: Brief Depression Severity Measure Score 1       Health Habits and Functional and Cognitive Screenin/6/2024    10:07 AM   Functional & Cognitive Status   Do you have difficulty preparing food and eating? No   Do you have difficulty bathing yourself, getting dressed or grooming yourself? No   Do you have difficulty using the toilet? No   Do you have difficulty moving around from place to place? No   Do you have trouble with steps or getting out of a bed or a chair? Yes   Current Diet Well Balanced Diet   Dental Exam Up to date   Eye Exam Up to date   Exercise (times per week) 2 times per week   Current Exercises Include Walking   Do you need help using the phone?  No   Are you deaf or do you have serious difficulty hearing?  No   Do you need help to go to places out of walking distance? Yes   Do you need help shopping? No   Do you need help preparing meals?  No   Do you need help with housework?  No   Do you need help with laundry? No   Do you need help taking your medications? No   Do you need help managing money? No   Do you ever drive or ride in a car without wearing a seat belt? No   Have you felt unusual stress, anger or loneliness in the last month? No   Who do you live  with? Spouse   If you need help, do you have trouble finding someone available to you? No   Have you been bothered in the last four weeks by sexual problems? No   Do you have difficulty concentrating, remembering or making decisions? Yes       Age-appropriate Screening Schedule:  Refer to the list below for future screening recommendations based on patient's age, sex and/or medical conditions. Orders for these recommended tests are listed in the plan section. The patient has been provided with a written plan.    Health Maintenance   Topic Date Due    DIABETIC FOOT EXAM  Never done    DXA SCAN  11/09/2023    HEMOGLOBIN A1C  01/13/2024    COVID-19 Vaccine (7 - 2023-24 season) 04/27/2024 (Originally 1/5/2024)    HEPATITIS C SCREENING  02/06/2025 (Originally 12/1/2020)    LIPID PANEL  07/13/2024    URINE MICROALBUMIN  07/13/2024    DIABETIC EYE EXAM  09/19/2024    ANNUAL WELLNESS VISIT  02/06/2025    BMI FOLLOWUP  02/06/2025    MAMMOGRAM  03/01/2025    COLORECTAL CANCER SCREENING  01/20/2027    TDAP/TD VACCINES (7 - Td or Tdap) 11/30/2033    RSV Vaccine - Adults >60 Years or Pregnant 32-36 Weeks  Completed    INFLUENZA VACCINE  Completed    Pneumococcal Vaccine 65+  Completed    ZOSTER VACCINE  Completed                  CMS Preventative Services Quick Reference  Risk Factors Identified During Encounter:    Fall Risk-High or Moderate: Discussed Fall Prevention in the home and Information on Fall Prevention Shared in After Visit Summary    The above risks/problems have been discussed with the patient.  Pertinent information has been shared with the patient in the After Visit Summary.    Diagnoses and all orders for this visit:    1. Other migraine without status migrainosus, not intractable (Primary)  -     Lipid Panel  -     CBC & Differential  -     Vitamin B12  -     Comprehensive Metabolic Panel  -     TSH  -     T4, Free  -     Vitamin D,25-Hydroxy  -     Hemoglobin A1c  -     MicroAlbumin, Urine, Random - Urine,  Clean Catch  -     Vitamin B6    2. Maxillary sinusitis, unspecified chronicity  -     ubrogepant (Ubrelvy) 100 MG tablet; Take 1 tablet by mouth 1 (One) Time As Needed (migraine).  Dispense: 7 tablet; Refill: 0  -     Lipid Panel  -     CBC & Differential  -     Vitamin B12  -     Comprehensive Metabolic Panel  -     TSH  -     T4, Free  -     Vitamin D,25-Hydroxy  -     Hemoglobin A1c  -     MicroAlbumin, Urine, Random - Urine, Clean Catch  -     Vitamin B6    3. Type 2 diabetes mellitus without complication, with long-term current use of insulin  -     ubrogepant (Ubrelvy) 100 MG tablet; Take 1 tablet by mouth 1 (One) Time As Needed (migraine).  Dispense: 7 tablet; Refill: 0  -     Lipid Panel  -     CBC & Differential  -     Vitamin B12  -     Comprehensive Metabolic Panel  -     TSH  -     T4, Free  -     Vitamin D,25-Hydroxy  -     Hemoglobin A1c  -     MicroAlbumin, Urine, Random - Urine, Clean Catch  -     Vitamin B6    4. Routine general medical examination at a health care facility  -     Lipid Panel  -     CBC & Differential  -     Vitamin B12  -     Comprehensive Metabolic Panel  -     TSH  -     T4, Free  -     Vitamin D,25-Hydroxy  -     Hemoglobin A1c  -     MicroAlbumin, Urine, Random - Urine, Clean Catch  -     Vitamin B6  -     Hepatitis C antibody    5. Vitamin D deficiency, unspecified  -     Vitamin D,25-Hydroxy    6. Bilateral hip pain  -     XR Hips Bilateral With or Without Pelvis 2 View; Future    7. Postmenopausal  -     DEXA Bone Density Axial    Other orders  -     Erenumab-aooe (Aimovig) 140 MG/ML auto-injector; Inject 1 mL under the skin into the appropriate area as directed Every 30 (Thirty) Days.  Dispense: 1 mL; Refill: 5    Subjective     Patient ID: Raquel Mariee is a 68 y.o. female. Patient is here for management of multiple medical problems.     Chief Complaint   Patient presents with    Medicare Wellness-subsequent     History of Present Illness     The following portions  of the patient's history were reviewed and updated as appropriate: allergies, current medications, past family history, past medical history, past social history, past surgical history and problem list.    Review of Systems    Current Outpatient Medications:     acetaminophen-codeine (TYLENOL #3) 300-30 MG per tablet, Take 1 tablet by mouth Every 4 (Four) Hours As Needed for Moderate Pain., Disp: 10 tablet, Rfl: 0    albuterol sulfate  (90 Base) MCG/ACT inhaler, Inhale 2 puffs by mouth every 4 to 6 hours as needed for cough, wheeze, shortness of breath. Use with vortex spacer, Disp: 8.5 g, Rfl: 3    allopurinol (ZYLOPRIM) 100 MG tablet, Take 1 tablet by mouth Daily., Disp: 90 tablet, Rfl: 3    benzonatate (TESSALON) 200 MG capsule, Take 1 capsule by mouth 3 (Three) Times a Day As Needed for Cough., Disp: 21 capsule, Rfl: 0    betamethasone valerate (VALISONE) 0.1 % cream, Apply topically to the appropriate area as directed Daily., Disp: 45 g, Rfl: 11    Budeson-Glycopyrrol-Formoterol (Breztri Aerosphere) 160-9-4.8 MCG/ACT aerosol inhaler, Inhale 2 puffs by mouth twice daily. Rinse mouth after use, Disp: 10.7 g, Rfl: 11    buPROPion SR (WELLBUTRIN SR) 150 MG 12 hr tablet, Take 1 tablet by mouth 2 (Two) Times a Day., Disp: 180 tablet, Rfl: 3    calcium carbonate (Antacid Maximum) 1000 MG chewable tablet, Chew 500 mg 3 (Three) Times a Day., Disp: , Rfl:     cholecalciferol (VITAMIN D3) 25 MCG (1000 UT) tablet, Take 1 tablet by mouth Daily., Disp: , Rfl:     clonazePAM (KlonoPIN) 0.5 MG tablet, Take 1 tablet by mouth At Night As Needed, Disp: 30 tablet, Rfl: 2    COVID-19 mRNA Vac-Omid,Pfizer, (Comirnaty) 30 MCG/0.3ML suspension prefilled syringe prefilled syringe, Inject into the appropriate muscle as directed by protocol, Disp: 0.3 mL, Rfl: 0    Dextromethorphan-guaiFENesin (MUCINEX DM PO), Take 2 tablets by mouth 2 (Two) Times a Day., Disp: , Rfl:     dicyclomine (BENTYL) 10 MG capsule, Take 1 capsule by mouth  3 (Three) Times a Day., Disp: 270 capsule, Rfl: 3    diphenhydrAMINE (Benadryl Allergy) 25 MG tablet, Take 1-2 tablets by mouth Every 4-6 Hours As Needed., Disp: 90 tablet, Rfl: 11    estradiol (ESTRACE) 0.1 MG/GM vaginal cream, Insert 0.5 grams vaginally twice weekly, Disp: 42.5 g, Rfl: 5    ferrous sulfate 325 (65 FE) MG EC tablet, Take 1 tablet by mouth Daily With Breakfast., Disp: , Rfl:     fexofenadine (ALLEGRA) 180 MG tablet, Take 1 tablet by mouth Daily., Disp: , Rfl:     fluticasone (FLONASE) 50 MCG/ACT nasal spray, Instill 1 spray into each nostril as directed by provider Daily., Disp: 16 g, Rfl: 1    furosemide (LASIX) 40 MG tablet, Take 1 tablet by mouth Daily, twice a month, Disp: 30 tablet, Rfl: 1    glucosamine-chondroitin 500-400 MG capsule capsule, Take 1 capsule by mouth 2 (Two) Times a Day With Meals., Disp: , Rfl:     Hydrocod Gabriel-Chlorphe Gabriel ER (TUSSIONEX PENNKINETIC) 10-8 MG/5ML ER suspension, Take 5 mL by mouth Every 12 (Twelve) Hours As Needed for Cough., Disp: 50 mL, Rfl: 0    Hypertonic Nasal Wash (Sinus Rinse Kit) pack, use once or twice daily as needed, Disp: 1 each, Rfl: 3    immune globulin, human, (Gammagard) 20 GM/200ML solution infusion, Infuse 40 g into a venous catheter Every 28 (Twenty-Eight) Days., Disp: 400 mL, Rfl: 0    Influenza Vac High-Dose Quad (FLUZONE HIGH DOSE) 0.7 ML suspension prefilled syringe injection, Inject 0.7 mL into the appropriate muscle as directed by prescriber., Disp: 0.7 mL, Rfl: 0    ipratropium (ATROVENT) 0.06 % nasal spray, Instill 1-2 sprays each nostril 2-3 times daily as needed, Disp: 15 mL, Rfl: 3    ipratropium-albuterol (DUO-NEB) 0.5-2.5 mg/3 ml nebulizer, Inhale the contents of 1 vial through a nebulizer every 4-6 hours as needed for cough,wheezing and shortness of breath., Disp: 90 mL, Rfl: 3    isosorbide mononitrate (IMDUR) 30 MG 24 hr tablet, Take 1 tablet by mouth Every Morning., Disp: 90 tablet, Rfl: 3    levothyroxine (SYNTHROID,  LEVOTHROID) 50 MCG tablet, Take 1 tablet by mouth Daily., Disp: 90 tablet, Rfl: 3    Magnesium 250 MG tablet, Take 2 tablets by mouth 2 (Two) Times a Day., Disp: , Rfl:     montelukast (SINGULAIR) 10 MG tablet, Take 1 tablet by mouth every night at bedtime., Disp: 90 tablet, Rfl: 3    montelukast (SINGULAIR) 10 MG tablet, Take 1 tablet by mouth every night at bedtime., Disp: 30 tablet, Rfl: 11    multivitamin with minerals tablet tablet, Take 1 tablet by mouth Daily., Disp: , Rfl:     omeprazole (priLOSEC) 40 MG capsule, Take 1 capsule by mouth 2 (Two) Times a Day., Disp: 180 capsule, Rfl: 3    ondansetron (ZOFRAN) 4 MG tablet, Take 1 tablet by mouth Every 8 (Eight) Hours As Needed for Nausea or Vomiting., Disp: 30 tablet, Rfl: 11    ondansetron (ZOFRAN) 4 MG tablet, Take 1 tablet by mouth Every 8 (Eight) Hours As Needed for Nausea or Vomiting., Disp: 30 tablet, Rfl: 11    POTASSIUM CHLORIDE PO, Take 20 mEq by mouth See Admin Instructions. Takes two times a month with Lasix, Disp: , Rfl:     potassium citrate (UROCIT-K) 10 MEQ (1080 MG) CR tablet, Take 1 tablet by mouth Daily., Disp: 90 tablet, Rfl: 3    predniSONE (DELTASONE) 20 MG tablet, Take 2 tablets prior to Gammgard infusion, Disp: 6 tablet, Rfl: 3    predniSONE (DELTASONE) 20 MG tablet, Take 2 tablets by mouth Daily., Disp: 10 tablet, Rfl: 0    RSVPreF3 Vac Recomb Adjuvanted (Arexvy) 120 MCG/0.5ML reconstituted suspension injection, Inject 0.5 mL into the appropriate muscle as directed by prescriber., Disp: 1 each, Rfl: 0    Spacer/Aero-Holding Chambers (AeroChamber Plus Wali-Vu) misc, Use as directed with albuterol inhaler, Disp: 1 each, Rfl: 0    Tetanus-Diphth-Acell Pertussis (Boostrix) 5-2.5-18.5 LF-MCG/0.5 injection, Inject  into the appropriate muscle as directed by prescriber., Disp: 0.5 mL, Rfl: 0    tiZANidine (ZANAFLEX) 4 MG tablet, Take 1 tablet by mouth Every Night., Disp: 90 tablet, Rfl: 1    ubrogepant (Ubrelvy) 100 MG tablet, Take 1 tablet by  "mouth 1 (One) Time As Needed (migraine)., Disp: 7 tablet, Rfl: 0    valsartan (DIOVAN) 160 MG tablet, Take 1 tablet by mouth Daily., Disp: 90 tablet, Rfl: 3    vitamin B-12 (CYANOCOBALAMIN) 1000 MCG tablet, Take 1 tablet by mouth Daily., Disp: , Rfl:     Erenumab-aooe (Aimovig) 140 MG/ML auto-injector, Inject 1 mL under the skin into the appropriate area as directed Every 30 (Thirty) Days., Disp: 1 mL, Rfl: 5    Current Facility-Administered Medications:     heparin injection 500 Units, 5 mL, Intravenous, PRN, Sanjeev Rawls MD    sodium chloride 0.9 % flush 10 mL, 10 mL, Intravenous, Q12H, Sanjeev Rawls MD    sodium chloride 0.9 % flush 10 mL, 10 mL, Intravenous, PRN, Sanjeev Rawls MD    sodium chloride 0.9 % flush 20 mL, 20 mL, Intravenous, PRN, Sanjeev Rawls MD    Objective      Blood pressure 132/84, pulse 83, temperature 98.3 °F (36.8 °C), resp. rate 16, height 165.1 cm (65\"), weight 93 kg (205 lb), SpO2 99%, not currently breastfeeding.    BMI is >= 30 and <35. (Class 1 Obesity). The following options were offered after discussion;: weight loss educational material (shared in after visit summary), exercise counseling/recommendations, and nutrition counseling/recommendations       Physical Exam     General Appearance:    Alert, cooperative, no distress, appears stated age   Head:    Normocephalic, without obvious abnormality, atraumatic   Eyes:    PERRL, conjunctiva/corneas clear, EOM's intact   Ears:    Normal TM's and external ear canals, both ears   Nose:   Nares normal, septum midline, mucosa normal, no drainage   or sinus tenderness   Throat:   Lips, mucosa, and tongue normal; teeth and gums normal   Neck:   Supple, symmetrical, trachea midline, no adenopathy;        thyroid:  No enlargement/tenderness/nodules; no carotid    bruit or JVD   Back:     Symmetric, no curvature, ROM normal, no CVA tenderness   Lungs:     Clear to auscultation bilaterally, respirations unlabored   Chest wall:    " No tenderness or deformity   Heart:    Regular rate and rhythm, S1 and S2 normal, no murmur,        rub or gallop   Abdomen:     Soft, non-tender, bowel sounds active all four quadrants,     no masses, no organomegaly   Extremities:   Extremities normal, atraumatic, no cyanosis or edema   Pulses:   2+ and symmetric all extremities   Skin:   Skin color, texture, turgor normal, no rashes or lesions   Lymph nodes:   Cervical, supraclavicular, and axillary nodes normal   Neurologic:   CNII-XII intact. Normal strength, sensation and reflexes       throughout      Results for orders placed or performed during the hospital encounter of 01/05/24   IgG    Specimen: Blood   Result Value Ref Range    IgG 1,071 700 - 1,600 mg/dL   Comprehensive Metabolic Panel    Specimen: Blood   Result Value Ref Range    Glucose 106 (H) 65 - 99 mg/dL    BUN 9 8 - 23 mg/dL    Creatinine 1.06 (H) 0.57 - 1.00 mg/dL    Sodium 134 (L) 136 - 145 mmol/L    Potassium 4.4 3.5 - 5.2 mmol/L    Chloride 98 98 - 107 mmol/L    CO2 27.8 22.0 - 29.0 mmol/L    Calcium 8.9 8.6 - 10.5 mg/dL    Total Protein 7.2 6.0 - 8.5 g/dL    Albumin 4.2 3.5 - 5.2 g/dL    ALT (SGPT) 26 1 - 33 U/L    AST (SGOT) 38 (H) 1 - 32 U/L    Alkaline Phosphatase 67 39 - 117 U/L    Total Bilirubin 0.4 0.0 - 1.2 mg/dL    Globulin 3.0 gm/dL    A/G Ratio 1.4 g/dL    BUN/Creatinine Ratio 8.5 7.0 - 25.0    Anion Gap 8.2 5.0 - 15.0 mmol/L    eGFR 57.3 (L) >60.0 mL/min/1.73   CBC Auto Differential    Specimen: Blood   Result Value Ref Range    WBC 6.47 3.40 - 10.80 10*3/mm3    RBC 3.83 3.77 - 5.28 10*6/mm3    Hemoglobin 12.5 12.0 - 15.9 g/dL    Hematocrit 36.6 34.0 - 46.6 %    MCV 95.6 79.0 - 97.0 fL    MCH 32.6 26.6 - 33.0 pg    MCHC 34.2 31.5 - 35.7 g/dL    RDW 13.2 12.3 - 15.4 %    RDW-SD 46.8 37.0 - 54.0 fl    MPV 8.9 6.0 - 12.0 fL    Platelets 206 140 - 450 10*3/mm3    Neutrophil % 64.7 42.7 - 76.0 %    Lymphocyte % 23.0 19.6 - 45.3 %    Monocyte % 8.2 5.0 - 12.0 %    Eosinophil % 2.6 0.3  - 6.2 %    Basophil % 0.9 0.0 - 1.5 %    Immature Grans % 0.6 (H) 0.0 - 0.5 %    Neutrophils, Absolute 4.18 1.70 - 7.00 10*3/mm3    Lymphocytes, Absolute 1.49 0.70 - 3.10 10*3/mm3    Monocytes, Absolute 0.53 0.10 - 0.90 10*3/mm3    Eosinophils, Absolute 0.17 0.00 - 0.40 10*3/mm3    Basophils, Absolute 0.06 0.00 - 0.20 10*3/mm3    Immature Grans, Absolute 0.04 0.00 - 0.05 10*3/mm3    nRBC 0.0 0.0 - 0.2 /100 WBC         Assessment & Plan   Wt down 10 lbs overall.      Migraine HA getting worse  Ss ok.    Was on injetable once a month.      Depression worse this time of year. Not wanting to adjust meds. Wants to wait till spring.    Renal function worsening. Not drinking a lot of water as she is a .    Pain Left groin. PT not helping. Thinks it is coming from hip. Declined formal PT. Ok with xr      Narrative & Impression   EXAMINATION: XR SHOULDER 2+ VW RIGHT-      INDICATION: right shoulder pian; M25.511-Pain in right shoulder;  G89.29-Other chronic pain      COMPARISON: NONE     FINDINGS: Mild acromioclavicular and glenohumeral arthrosis changes are  present without evidence of fracture or dislocation. Mild irregularity  is noted involving the humerus footprint, possibly reflecting underlying  cuff pathology.     IMPRESSION:  Mild acromioclavicular and glenohumeral arthrosis changes  are present without evidence of fracture or dislocation. Mild  irregularity is noted involving the humerus footprint, possibly  reflecting underlying cuff pathology.     This report was finalized on 9/21/2021 3:45 PM by David Ratliff.   Pt had apt with ortho. Didn't occur.         Neuropathy in feel shooting. In feet.   Increased edema.   Still drinking soda.  Down from 2 gallons a day.   Now 2 sodas a day.      Aortic valve getting worse. May need surgeon.                Diagnoses and all orders for this visit:    1. Other migraine without status migrainosus, not intractable (Primary)  -     Lipid Panel  -     CBC &  Differential  -     Vitamin B12  -     Comprehensive Metabolic Panel  -     TSH  -     T4, Free  -     Vitamin D,25-Hydroxy  -     Hemoglobin A1c  -     MicroAlbumin, Urine, Random - Urine, Clean Catch  -     Vitamin B6    2. Maxillary sinusitis, unspecified chronicity  -     ubrogepant (Ubrelvy) 100 MG tablet; Take 1 tablet by mouth 1 (One) Time As Needed (migraine).  Dispense: 7 tablet; Refill: 0  -     Lipid Panel  -     CBC & Differential  -     Vitamin B12  -     Comprehensive Metabolic Panel  -     TSH  -     T4, Free  -     Vitamin D,25-Hydroxy  -     Hemoglobin A1c  -     MicroAlbumin, Urine, Random - Urine, Clean Catch  -     Vitamin B6    3. Type 2 diabetes mellitus without complication, with long-term current use of insulin  -     ubrogepant (Ubrelvy) 100 MG tablet; Take 1 tablet by mouth 1 (One) Time As Needed (migraine).  Dispense: 7 tablet; Refill: 0  -     Lipid Panel  -     CBC & Differential  -     Vitamin B12  -     Comprehensive Metabolic Panel  -     TSH  -     T4, Free  -     Vitamin D,25-Hydroxy  -     Hemoglobin A1c  -     MicroAlbumin, Urine, Random - Urine, Clean Catch  -     Vitamin B6    4. Routine general medical examination at a health care facility  -     Lipid Panel  -     CBC & Differential  -     Vitamin B12  -     Comprehensive Metabolic Panel  -     TSH  -     T4, Free  -     Vitamin D,25-Hydroxy  -     Hemoglobin A1c  -     MicroAlbumin, Urine, Random - Urine, Clean Catch  -     Vitamin B6  -     Hepatitis C antibody    5. Vitamin D deficiency, unspecified  -     Vitamin D,25-Hydroxy    6. Bilateral hip pain  -     XR Hips Bilateral With or Without Pelvis 2 View; Future    7. Postmenopausal  -     DEXA Bone Density Axial    Other orders  -     Erenumab-aooe (Aimovig) 140 MG/ML auto-injector; Inject 1 mL under the skin into the appropriate area as directed Every 30 (Thirty) Days.  Dispense: 1 mL; Refill: 5      Return in about 6 months (around 8/6/2024).          There are no  Patient Instructions on file for this visit.     Sanjeev Rawls MD    Assessment & Plan         Follow Up:   Next Medicare Wellness visit to be scheduled in 1 year.      An After Visit Summary and PPPS were made available to the patient.

## 2024-02-07 DIAGNOSIS — F32.0 CURRENT MILD EPISODE OF MAJOR DEPRESSIVE DISORDER, UNSPECIFIED WHETHER RECURRENT: Chronic | ICD-10-CM

## 2024-02-07 NOTE — PROGRESS NOTES
IMPRESSION:  Impression:  No acute osseous findings. Minimal to mild osteoarthritic change of the hip joints.

## 2024-02-08 RX ORDER — BUPROPION HYDROCHLORIDE 150 MG/1
150 TABLET, EXTENDED RELEASE ORAL 2 TIMES DAILY
Qty: 180 TABLET | Refills: 3 | Status: SHIPPED | OUTPATIENT
Start: 2024-02-08

## 2024-02-10 ENCOUNTER — TRANSCRIBE ORDERS (OUTPATIENT)
Dept: ADMINISTRATIVE | Facility: HOSPITAL | Age: 69
End: 2024-02-10
Payer: MEDICARE

## 2024-02-10 DIAGNOSIS — Z12.31 SCREENING MAMMOGRAM FOR BREAST CANCER: Primary | ICD-10-CM

## 2024-02-12 RX ORDER — AZITHROMYCIN 250 MG/1
TABLET, FILM COATED ORAL
Qty: 6 TABLET | Refills: 0 | Status: SHIPPED | OUTPATIENT
Start: 2024-02-12

## 2024-02-12 RX ORDER — PREDNISONE 20 MG/1
TABLET ORAL
Qty: 18 TABLET | Refills: 0 | Status: SHIPPED | OUTPATIENT
Start: 2024-02-12 | End: 2024-02-19 | Stop reason: SDUPTHER

## 2024-02-13 ENCOUNTER — TELEPHONE (OUTPATIENT)
Dept: PULMONOLOGY | Facility: CLINIC | Age: 69
End: 2024-02-13

## 2024-02-13 NOTE — TELEPHONE ENCOUNTER
Provider: APRN FIELDS    Caller: CHIQUI MILAN    Relationship to Patient: SELF    Pharmacy: [facility]     Phone Number: [phone]    Reason for Call: PT CALLED THINKS SHE HAS SINUSITIS & POST NASAL DRIP IS CAUSING WHEEZING AND ASTHMA TO FLARE UP; PT DOES NOT HAVE COVID. DO NOT HAVE A FEVERTHIS SYMPTOMS STARTED 2/8/24.    PT HAS STARTED PREDNISONE AND HER NEBULIZER. THINKS NEED ANTIBIOTIC FOR SINUSITIS..      NEED REFILL OF TUSSIONEX    MORE DETAILS STATED ON 2/11/24 MESSAGE.     When was the patient last seen: 1/19/24    When did it start: 2/8/24    Timing- Is it constant or intermittent: INTERMITTENT    What makes it worse: ASTHMA COUGH WHEN SHE IS UP MOVING AROUND    What makes it better: IT QUIETS DOWN WHEN SITTING STILL    What therapies/medications have you tried: PREDNISONE

## 2024-02-16 ENCOUNTER — PRIOR AUTHORIZATION (OUTPATIENT)
Dept: INTERNAL MEDICINE | Facility: CLINIC | Age: 69
End: 2024-02-16
Payer: MEDICARE

## 2024-02-16 NOTE — TELEPHONE ENCOUNTER
CHIQUI MILAN Key: [id number] - PA Case ID: [id number] help? Call us at [phone]  Status  Sent to [address]  [facility]  Aimovig 140MG/ML auto-injectors  Form  [employer] Medicare Electronic PA Form (2017 NCPDP)    This PA on Cover My Meds did not ask for any attachments.

## 2024-02-19 DIAGNOSIS — R06.02 SHORTNESS OF BREATH: Primary | ICD-10-CM

## 2024-02-19 RX ORDER — PREDNISONE 20 MG/1
TABLET ORAL
Qty: 18 TABLET | Refills: 0 | Status: SHIPPED | OUTPATIENT
Start: 2024-02-19

## 2024-02-19 NOTE — TELEPHONE ENCOUNTER
Outcome  Approved on February 16  Your request has been approved  Authorization Expiration Date: 5/16/2024

## 2024-02-20 ENCOUNTER — TELEPHONE (OUTPATIENT)
Dept: PULMONOLOGY | Facility: CLINIC | Age: 69
End: 2024-02-20
Payer: MEDICARE

## 2024-02-20 ENCOUNTER — HOSPITAL ENCOUNTER (OUTPATIENT)
Dept: GENERAL RADIOLOGY | Facility: HOSPITAL | Age: 69
Discharge: HOME | End: 2024-02-20
Admitting: NURSE PRACTITIONER
Payer: MEDICARE

## 2024-02-20 DIAGNOSIS — R06.02 SHORTNESS OF BREATH: ICD-10-CM

## 2024-02-20 PROCEDURE — 71046 X-RAY EXAM CHEST 2 VIEWS: CPT

## 2024-02-20 RX ORDER — DICYCLOMINE HYDROCHLORIDE 10 MG/1
10 CAPSULE ORAL 3 TIMES DAILY
Qty: 270 CAPSULE | Refills: 3 | Status: SHIPPED | OUTPATIENT
Start: 2024-02-20

## 2024-02-20 NOTE — TELEPHONE ENCOUNTER
Informed patient, per LILIBETH Womack, that she sent her another course of prednisone to use if she needs it. Leila also has  placed an order for a CXR. Any respiratory distress should be evaluated in the ER. Patient states understanding of information, and agrees to course of action.

## 2024-02-20 NOTE — TELEPHONE ENCOUNTER
----- Message from LILIBETH Hooker sent at 2/19/2024  1:40 PM EST -----  Regarding: FW: Asthma  Contact: [phone]  I have sent her another course of prednisone to use if she needs it. I have placed an order for a CXR. Any respiratory distress should be evaluated in the ER.    ----- Message -----  From: Vonnie Grijalva MA  Sent: 2/19/2024  11:07 AM EST  To: LILIBETH Hooker  Subject: FW: Asthma                                       Patient states she has had cough, congestion- yellow mucus, intermittent SOB, intermittent chills and body aches, afebrile onset 2/9/24.  Patient states she would like to come in today to get checked out because she does not want to go back to work earlier than she should.  Patient did at home COVID-19 test and it was negative.  I informed patient that we do not have any openings today, but informed patient that she should probably see her PCP.  Informed patient that I would message Shanta for advice and call her back.   ----- Message -----  From: Raquel Mariee  Sent: 2/19/2024   8:11 AM EST  [facility]  Subject: Asthma                                           Actually the past 2 days mostly problems in evening.  So, maybe it will. Subside finally.

## 2024-02-23 ENCOUNTER — APPOINTMENT (OUTPATIENT)
Dept: BONE DENSITY | Facility: HOSPITAL | Age: 69
End: 2024-02-23
Payer: MEDICARE

## 2024-02-23 PROCEDURE — 77080 DXA BONE DENSITY AXIAL: CPT

## 2024-03-01 ENCOUNTER — HOSPITAL ENCOUNTER (OUTPATIENT)
Dept: INFUSION THERAPY | Facility: HOSPITAL | Age: 69
Discharge: HOME | End: 2024-03-01
Payer: MEDICARE

## 2024-03-01 VITALS
OXYGEN SATURATION: 97 % | RESPIRATION RATE: 18 BRPM | TEMPERATURE: 97.5 F | DIASTOLIC BLOOD PRESSURE: 68 MMHG | SYSTOLIC BLOOD PRESSURE: 124 MMHG | HEART RATE: 72 BPM

## 2024-03-01 DIAGNOSIS — M81.0 OSTEOPOROSIS, UNSPECIFIED OSTEOPOROSIS TYPE, UNSPECIFIED PATHOLOGICAL FRACTURE PRESENCE: Primary | ICD-10-CM

## 2024-03-01 DIAGNOSIS — Z95.828 PORT-A-CATH IN PLACE: Primary | ICD-10-CM

## 2024-03-01 DIAGNOSIS — D80.1 COMMON VARIABLE AGAMMAGLOBULINEMIA: ICD-10-CM

## 2024-03-01 LAB
25(OH)D3 SERPL-MCNC: 51.5 NG/ML (ref 30–100)
ALBUMIN SERPL-MCNC: 4.3 G/DL (ref 3.5–5.2)
ALBUMIN UR-MCNC: <1.2 MG/DL
ALBUMIN/GLOB SERPL: 1.7 G/DL
ALP SERPL-CCNC: 60 U/L (ref 39–117)
ALT SERPL W P-5'-P-CCNC: 22 U/L (ref 1–33)
ANION GAP SERPL CALCULATED.3IONS-SCNC: 9.3 MMOL/L (ref 5–15)
AST SERPL-CCNC: 29 U/L (ref 1–32)
BASOPHILS # BLD AUTO: 0.06 10*3/MM3 (ref 0–0.2)
BASOPHILS NFR BLD AUTO: 1.3 % (ref 0–1.5)
BILIRUB SERPL-MCNC: 0.4 MG/DL (ref 0–1.2)
BUN SERPL-MCNC: 9 MG/DL (ref 8–23)
BUN/CREAT SERPL: 8.9 (ref 7–25)
CALCIUM SPEC-SCNC: 8.9 MG/DL (ref 8.6–10.5)
CHLORIDE SERPL-SCNC: 99 MMOL/L (ref 98–107)
CHOLEST SERPL-MCNC: 196 MG/DL (ref 0–200)
CO2 SERPL-SCNC: 25.7 MMOL/L (ref 22–29)
CREAT SERPL-MCNC: 1.01 MG/DL (ref 0.57–1)
DEPRECATED RDW RBC AUTO: 43.2 FL (ref 37–54)
EGFRCR SERPLBLD CKD-EPI 2021: 60.8 ML/MIN/1.73
EOSINOPHIL # BLD AUTO: 0.22 10*3/MM3 (ref 0–0.4)
EOSINOPHIL NFR BLD AUTO: 4.8 % (ref 0.3–6.2)
ERYTHROCYTE [DISTWIDTH] IN BLOOD BY AUTOMATED COUNT: 12.5 % (ref 12.3–15.4)
GLOBULIN UR ELPH-MCNC: 2.6 GM/DL
GLUCOSE SERPL-MCNC: 114 MG/DL (ref 65–99)
HBA1C MFR BLD: 6.3 % (ref 4.8–5.6)
HCT VFR BLD AUTO: 36.8 % (ref 34–46.6)
HCV AB SER DONR QL: NORMAL
HDLC SERPL-MCNC: 88 MG/DL (ref 40–60)
HGB BLD-MCNC: 12.7 G/DL (ref 12–15.9)
IMM GRANULOCYTES # BLD AUTO: 0.01 10*3/MM3 (ref 0–0.05)
IMM GRANULOCYTES NFR BLD AUTO: 0.2 % (ref 0–0.5)
LDLC SERPL CALC-MCNC: 95 MG/DL (ref 0–100)
LDLC/HDLC SERPL: 1.06 {RATIO}
LYMPHOCYTES # BLD AUTO: 1.19 10*3/MM3 (ref 0.7–3.1)
LYMPHOCYTES NFR BLD AUTO: 25.8 % (ref 19.6–45.3)
MCH RBC QN AUTO: 32.2 PG (ref 26.6–33)
MCHC RBC AUTO-ENTMCNC: 34.5 G/DL (ref 31.5–35.7)
MCV RBC AUTO: 93.4 FL (ref 79–97)
MONOCYTES # BLD AUTO: 0.41 10*3/MM3 (ref 0.1–0.9)
MONOCYTES NFR BLD AUTO: 8.9 % (ref 5–12)
NEUTROPHILS NFR BLD AUTO: 2.72 10*3/MM3 (ref 1.7–7)
NEUTROPHILS NFR BLD AUTO: 59 % (ref 42.7–76)
NRBC BLD AUTO-RTO: 0 /100 WBC (ref 0–0.2)
PLATELET # BLD AUTO: 196 10*3/MM3 (ref 140–450)
PMV BLD AUTO: 8.9 FL (ref 6–12)
POTASSIUM SERPL-SCNC: 4.2 MMOL/L (ref 3.5–5.2)
PROT SERPL-MCNC: 6.9 G/DL (ref 6–8.5)
RBC # BLD AUTO: 3.94 10*6/MM3 (ref 3.77–5.28)
SODIUM SERPL-SCNC: 134 MMOL/L (ref 136–145)
T4 FREE SERPL-MCNC: 1.09 NG/DL (ref 0.93–1.7)
TRIGL SERPL-MCNC: 72 MG/DL (ref 0–150)
TSH SERPL DL<=0.05 MIU/L-ACNC: 1.56 UIU/ML (ref 0.27–4.2)
VIT B12 BLD-MCNC: 1875 PG/ML (ref 211–946)
VLDLC SERPL-MCNC: 13 MG/DL (ref 5–40)
WBC NRBC COR # BLD AUTO: 4.61 10*3/MM3 (ref 3.4–10.8)

## 2024-03-01 PROCEDURE — 82306 VITAMIN D 25 HYDROXY: CPT | Performed by: INTERNAL MEDICINE

## 2024-03-01 PROCEDURE — 80061 LIPID PANEL: CPT | Performed by: INTERNAL MEDICINE

## 2024-03-01 PROCEDURE — 84207 ASSAY OF VITAMIN B-6: CPT | Performed by: INTERNAL MEDICINE

## 2024-03-01 PROCEDURE — 96366 THER/PROPH/DIAG IV INF ADDON: CPT

## 2024-03-01 PROCEDURE — 96361 HYDRATE IV INFUSION ADD-ON: CPT

## 2024-03-01 PROCEDURE — 82043 UR ALBUMIN QUANTITATIVE: CPT | Performed by: INTERNAL MEDICINE

## 2024-03-01 PROCEDURE — 25810000003 SODIUM CHLORIDE 0.9 % SOLUTION: Performed by: ALLERGY & IMMUNOLOGY

## 2024-03-01 PROCEDURE — 96365 THER/PROPH/DIAG IV INF INIT: CPT

## 2024-03-01 PROCEDURE — 25010000002 HEPARIN LOCK FLUSH PER 10 UNITS: Performed by: PSYCHIATRY & NEUROLOGY

## 2024-03-01 PROCEDURE — 85025 COMPLETE CBC W/AUTO DIFF WBC: CPT | Performed by: INTERNAL MEDICINE

## 2024-03-01 PROCEDURE — 84439 ASSAY OF FREE THYROXINE: CPT | Performed by: INTERNAL MEDICINE

## 2024-03-01 PROCEDURE — 84443 ASSAY THYROID STIM HORMONE: CPT | Performed by: INTERNAL MEDICINE

## 2024-03-01 PROCEDURE — 86803 HEPATITIS C AB TEST: CPT | Performed by: INTERNAL MEDICINE

## 2024-03-01 PROCEDURE — 25010000002 IMMUNE GLOBULIN (HUMAN) 20 GM/200ML SOLUTION: Performed by: ALLERGY & IMMUNOLOGY

## 2024-03-01 PROCEDURE — 80053 COMPREHEN METABOLIC PANEL: CPT | Performed by: INTERNAL MEDICINE

## 2024-03-01 PROCEDURE — 82607 VITAMIN B-12: CPT | Performed by: INTERNAL MEDICINE

## 2024-03-01 PROCEDURE — 83036 HEMOGLOBIN GLYCOSYLATED A1C: CPT | Performed by: INTERNAL MEDICINE

## 2024-03-01 RX ORDER — EPINEPHRINE 1 MG/ML
0.3 INJECTION, SOLUTION INTRAMUSCULAR; SUBCUTANEOUS ONCE AS NEEDED
Start: 2024-03-29

## 2024-03-01 RX ORDER — DIPHENHYDRAMINE HCL 25 MG
50 CAPSULE ORAL ONCE AS NEEDED
Start: 2024-03-29

## 2024-03-01 RX ORDER — DIPHENHYDRAMINE HCL 25 MG
50 CAPSULE ORAL ONCE AS NEEDED
Status: CANCELLED
Start: 2024-03-01

## 2024-03-01 RX ORDER — PREDNISONE 20 MG/1
40 TABLET ORAL ONCE AS NEEDED
Status: CANCELLED
Start: 2024-03-01

## 2024-03-01 RX ORDER — SODIUM CHLORIDE 0.9 % (FLUSH) 0.9 %
10 SYRINGE (ML) INJECTION AS NEEDED
OUTPATIENT
Start: 2024-03-01

## 2024-03-01 RX ORDER — DIPHENHYDRAMINE HCL 25 MG
50 CAPSULE ORAL ONCE
Status: DISCONTINUED | OUTPATIENT
Start: 2024-03-01 | End: 2024-03-03 | Stop reason: HOSPADM

## 2024-03-01 RX ORDER — PREDNISONE 20 MG/1
40 TABLET ORAL ONCE
Start: 2024-03-29 | End: 2024-03-29

## 2024-03-01 RX ORDER — PREDNISONE 20 MG/1
40 TABLET ORAL ONCE AS NEEDED
Start: 2024-03-29

## 2024-03-01 RX ORDER — METHYLPREDNISOLONE SODIUM SUCCINATE 125 MG/2ML
50 INJECTION, POWDER, LYOPHILIZED, FOR SOLUTION INTRAMUSCULAR; INTRAVENOUS ONCE AS NEEDED
Start: 2024-03-29

## 2024-03-01 RX ORDER — ACETAMINOPHEN 325 MG/1
325 TABLET ORAL ONCE
Status: DISCONTINUED | OUTPATIENT
Start: 2024-03-01 | End: 2024-03-03 | Stop reason: HOSPADM

## 2024-03-01 RX ORDER — DIPHENHYDRAMINE HCL 25 MG
50 CAPSULE ORAL ONCE
Start: 2024-03-29 | End: 2024-03-29

## 2024-03-01 RX ORDER — HEPARIN SODIUM (PORCINE) LOCK FLUSH IV SOLN 100 UNIT/ML 100 UNIT/ML
500 SOLUTION INTRAVENOUS AS NEEDED
Status: DISCONTINUED | OUTPATIENT
Start: 2024-03-01 | End: 2024-03-03 | Stop reason: HOSPADM

## 2024-03-01 RX ORDER — EPINEPHRINE 1 MG/ML
0.3 INJECTION, SOLUTION INTRAMUSCULAR; SUBCUTANEOUS ONCE AS NEEDED
Status: CANCELLED
Start: 2024-03-01

## 2024-03-01 RX ORDER — HEPARIN SODIUM (PORCINE) LOCK FLUSH IV SOLN 100 UNIT/ML 100 UNIT/ML
500 SOLUTION INTRAVENOUS AS NEEDED
OUTPATIENT
Start: 2024-03-01

## 2024-03-01 RX ORDER — PREDNISONE 20 MG/1
40 TABLET ORAL ONCE
Status: DISCONTINUED | OUTPATIENT
Start: 2024-03-01 | End: 2024-03-03 | Stop reason: HOSPADM

## 2024-03-01 RX ORDER — ACETAMINOPHEN 325 MG/1
325 TABLET ORAL ONCE
OUTPATIENT
Start: 2024-03-29

## 2024-03-01 RX ORDER — METHYLPREDNISOLONE SODIUM SUCCINATE 125 MG/2ML
50 INJECTION, POWDER, LYOPHILIZED, FOR SOLUTION INTRAMUSCULAR; INTRAVENOUS ONCE AS NEEDED
Status: CANCELLED
Start: 2024-03-01

## 2024-03-01 RX ORDER — SODIUM CHLORIDE 0.9 % (FLUSH) 0.9 %
10 SYRINGE (ML) INJECTION AS NEEDED
Status: DISCONTINUED | OUTPATIENT
Start: 2024-03-01 | End: 2024-03-03 | Stop reason: HOSPADM

## 2024-03-01 RX ADMIN — Medication 10 ML: at 12:36

## 2024-03-01 RX ADMIN — HEPARIN 500 UNITS: 100 SYRINGE at 12:38

## 2024-03-01 RX ADMIN — SODIUM CHLORIDE 500 ML: 9 INJECTION, SOLUTION INTRAVENOUS at 09:07

## 2024-03-01 RX ADMIN — Medication 10 ML: at 12:37

## 2024-03-01 RX ADMIN — IMMUNE GLOBULIN INFUSION (HUMAN) 20 G: 100 INJECTION, SOLUTION INTRAVENOUS; SUBCUTANEOUS at 09:34

## 2024-03-01 RX ADMIN — IMMUNE GLOBULIN INFUSION (HUMAN) 20 G: 100 INJECTION, SOLUTION INTRAVENOUS; SUBCUTANEOUS at 11:26

## 2024-03-06 DIAGNOSIS — J32.0 MAXILLARY SINUSITIS, UNSPECIFIED CHRONICITY: ICD-10-CM

## 2024-03-06 DIAGNOSIS — Z79.4 TYPE 2 DIABETES MELLITUS WITHOUT COMPLICATION, WITH LONG-TERM CURRENT USE OF INSULIN: ICD-10-CM

## 2024-03-06 DIAGNOSIS — E11.9 TYPE 2 DIABETES MELLITUS WITHOUT COMPLICATION, WITH LONG-TERM CURRENT USE OF INSULIN: ICD-10-CM

## 2024-03-06 RX ORDER — DICYCLOMINE HYDROCHLORIDE 10 MG/1
10 CAPSULE ORAL 3 TIMES DAILY
Qty: 270 CAPSULE | Refills: 3 | Status: SHIPPED | OUTPATIENT
Start: 2024-03-06

## 2024-03-07 LAB — PYRIDOXAL PHOS SERPL-MCNC: 15 UG/L (ref 3.4–65.2)

## 2024-03-11 ENCOUNTER — SPECIALTY PHARMACY (OUTPATIENT)
Dept: PHARMACY | Facility: HOSPITAL | Age: 69
End: 2024-03-11
Payer: MEDICARE

## 2024-03-11 RX ORDER — MEPOLIZUMAB 100 MG/ML
100 INJECTION, SOLUTION SUBCUTANEOUS
COMMUNITY

## 2024-03-11 NOTE — PROGRESS NOTES
Ambulatory Care Clinic  Specialty Pharmacy Initiation Review       Raquel Mariee is a 68 y.o. YO female who has been diagnosed with severe eosinophilic asthma and prescribed Nucala. Patient has been referred to the [facility] to receive specialty pharmacy services for medication initiation/management.    Indication, effectiveness, safety and convenience of medication reviewed today. Prior authorization approved. Patient obtaining via [facility] to Nucala PAP. First dose received in mail today.    PharmD will conduct initial clinical assessment and provide patient education during first clinic appointment on 3/12/24. Spoke briefly with office staff at [facility] (Dr. Garza) regarding concomitant Gammagard, no opinion on Nucala initiation and leaving up to pulmonology office. Will provide extensive counseling on infection risk. Patient has taken Nucala before without issue and had significant improvement at that time.    Giovani Holguin RPH  3/11/2024  15:29 EDT

## 2024-03-12 ENCOUNTER — DISEASE STATE MANAGEMENT VISIT (OUTPATIENT)
Dept: PHARMACY | Facility: HOSPITAL | Age: 69
End: 2024-03-12
Payer: MEDICARE

## 2024-03-12 ENCOUNTER — SPECIALTY PHARMACY (OUTPATIENT)
Dept: PHARMACY | Facility: HOSPITAL | Age: 69
End: 2024-03-12
Payer: MEDICARE

## 2024-03-12 DIAGNOSIS — J45.50 SEVERE PERSISTENT ASTHMA WITHOUT COMPLICATION: Primary | ICD-10-CM

## 2024-03-12 PROCEDURE — G0463 HOSPITAL OUTPT CLINIC VISIT: HCPCS

## 2024-03-12 NOTE — PROGRESS NOTES
Patient here for Nucala injection today. Patient is obtaining via [facility] to Nucala PAP.     Patient reports that she tolerated last injection well without ADRs.    Nucala administered in clinic today in L upper abdomen.     NDC: [id number]  LOT: [id number]  EXP: 09/30/2026    Patient will continue injections at home every 28 days. She is aware to reach out to [facility] to schedule next delivery two weeks prior to next injection scheduled for 4/9/2024. Patient was given phone number to contact [facility].     Liane Martinez, PharmD  3/12/2024  15:35 EDT

## 2024-03-13 ENCOUNTER — OFFICE VISIT (OUTPATIENT)
Dept: CARDIOLOGY | Facility: CLINIC | Age: 69
End: 2024-03-13
Payer: MEDICARE

## 2024-03-13 VITALS
BODY MASS INDEX: 39.75 KG/M2 | WEIGHT: 216 LBS | SYSTOLIC BLOOD PRESSURE: 142 MMHG | RESPIRATION RATE: 18 BRPM | HEIGHT: 62 IN | OXYGEN SATURATION: 95 % | HEART RATE: 114 BPM | DIASTOLIC BLOOD PRESSURE: 80 MMHG

## 2024-03-13 DIAGNOSIS — N18.30 STAGE 3 CHRONIC KIDNEY DISEASE, UNSPECIFIED WHETHER STAGE 3A OR 3B CKD: ICD-10-CM

## 2024-03-13 DIAGNOSIS — I50.32 CHRONIC DIASTOLIC HEART FAILURE: ICD-10-CM

## 2024-03-13 DIAGNOSIS — I35.0 AORTIC STENOSIS, MODERATE: Primary | ICD-10-CM

## 2024-03-13 DIAGNOSIS — I20.1 PRINZMETAL ANGINA: ICD-10-CM

## 2024-03-13 PROCEDURE — 99213 OFFICE O/P EST LOW 20 MIN: CPT | Performed by: INTERNAL MEDICINE

## 2024-03-13 PROCEDURE — 3077F SYST BP >= 140 MM HG: CPT | Performed by: INTERNAL MEDICINE

## 2024-03-13 PROCEDURE — 3079F DIAST BP 80-89 MM HG: CPT | Performed by: INTERNAL MEDICINE

## 2024-03-18 DIAGNOSIS — K64.9 HEMORRHOIDS, UNSPECIFIED HEMORRHOID TYPE: Primary | ICD-10-CM

## 2024-03-19 ENCOUNTER — TELEPHONE (OUTPATIENT)
Dept: PULMONOLOGY | Facility: CLINIC | Age: 69
End: 2024-03-19

## 2024-03-19 NOTE — TELEPHONE ENCOUNTER
Hub staff attempted to follow warm transfer process and was unsuccessful     Caller: Raquel Mariee    Relationship to patient: Self    Best call back number: [phone]     Patient is needing: PATIENT REPORTS A PERSISTENT COUGH THAT SHE IS VERY CONCERNED ABOUT. SHE WOULD LIKE TO SEE IF LAURA HAS ANY OPENINGS TODAY OR TOMORROW FOR HER TO BE SEEN. PLEASE CALL BACK TO ADVISE.

## 2024-03-27 ENCOUNTER — OFFICE VISIT (OUTPATIENT)
Dept: INTERNAL MEDICINE | Facility: CLINIC | Age: 69
End: 2024-03-27
Payer: MEDICARE

## 2024-03-27 VITALS
TEMPERATURE: 97.9 F | WEIGHT: 209 LBS | OXYGEN SATURATION: 95 % | BODY MASS INDEX: 35.68 KG/M2 | HEART RATE: 88 BPM | DIASTOLIC BLOOD PRESSURE: 82 MMHG | HEIGHT: 64 IN | RESPIRATION RATE: 16 BRPM | SYSTOLIC BLOOD PRESSURE: 140 MMHG

## 2024-03-27 DIAGNOSIS — J45.901 MODERATE ASTHMA WITH EXACERBATION, UNSPECIFIED WHETHER PERSISTENT: ICD-10-CM

## 2024-03-27 DIAGNOSIS — J45.901 EXACERBATION OF ASTHMA, UNSPECIFIED ASTHMA SEVERITY, UNSPECIFIED WHETHER PERSISTENT: ICD-10-CM

## 2024-03-27 DIAGNOSIS — F51.01 PRIMARY INSOMNIA: Primary | ICD-10-CM

## 2024-03-27 DIAGNOSIS — M54.50 CHRONIC BILATERAL LOW BACK PAIN WITHOUT SCIATICA: ICD-10-CM

## 2024-03-27 DIAGNOSIS — G89.29 CHRONIC BILATERAL LOW BACK PAIN WITHOUT SCIATICA: ICD-10-CM

## 2024-03-27 PROCEDURE — 3044F HG A1C LEVEL LT 7.0%: CPT | Performed by: INTERNAL MEDICINE

## 2024-03-27 PROCEDURE — 99214 OFFICE O/P EST MOD 30 MIN: CPT | Performed by: INTERNAL MEDICINE

## 2024-03-27 PROCEDURE — 3077F SYST BP >= 140 MM HG: CPT | Performed by: INTERNAL MEDICINE

## 2024-03-27 PROCEDURE — 3079F DIAST BP 80-89 MM HG: CPT | Performed by: INTERNAL MEDICINE

## 2024-03-27 RX ORDER — CLONIDINE HYDROCHLORIDE 0.1 MG/1
0.1 TABLET ORAL 2 TIMES DAILY
Qty: 30 TABLET | Refills: 5 | Status: SHIPPED | OUTPATIENT
Start: 2024-03-27

## 2024-03-27 RX ORDER — CLARITHROMYCIN 500 MG/1
500 TABLET, COATED ORAL EVERY 12 HOURS SCHEDULED
Qty: 20 TABLET | Refills: 0 | Status: SHIPPED | OUTPATIENT
Start: 2024-03-27

## 2024-03-27 RX ORDER — ACETAMINOPHEN AND CODEINE PHOSPHATE 300; 30 MG/1; MG/1
1 TABLET ORAL EVERY 4 HOURS PRN
Qty: 30 TABLET | Refills: 0 | Status: SHIPPED | OUTPATIENT
Start: 2024-03-27

## 2024-03-27 NOTE — PROGRESS NOTES
Subjective     Patient ID: Raquel Mariee is a 68 y.o. female. Patient is here for management of multiple medical problems.     Chief Complaint   Patient presents with    Asthma    Cough     History of Present Illness   Occ sever HA.   Occ back pain. Want rf of t#3.  10 pill for sever pain.    Asthma ongoing.  Cough with sputum.   Hx of tracheal sugery. Tussinex.           Pt also wants rf of clonazapam.   Uses for sleep.        The following portions of the patient's history were reviewed and updated as appropriate: allergies, current medications, past family history, past medical history, past social history, past surgical history and problem list.    Review of Systems    Current Outpatient Medications:     albuterol sulfate  (90 Base) MCG/ACT inhaler, Inhale 2 puffs by mouth every 4 to 6 hours as needed for cough, wheeze, shortness of breath. Use with vortex spacer, Disp: 8.5 g, Rfl: 3    allopurinol (ZYLOPRIM) 100 MG tablet, Take 1 tablet by mouth Daily., Disp: 90 tablet, Rfl: 3    betamethasone valerate (VALISONE) 0.1 % cream, Apply topically to the appropriate area as directed Daily. (Patient taking differently: Apply  topically to the appropriate area as directed Daily As Needed.), Disp: 45 g, Rfl: 11    Budeson-Glycopyrrol-Formoterol (Breztri Aerosphere) 160-9-4.8 MCG/ACT aerosol inhaler, Inhale 2 puffs by mouth twice daily. Rinse mouth after use, Disp: 10.7 g, Rfl: 11    buPROPion SR (WELLBUTRIN SR) 150 MG 12 hr tablet, Take 1 tablet by mouth 2 (Two) Times a Day., Disp: 180 tablet, Rfl: 3    calcium carbonate (Antacid Maximum) 1000 MG chewable tablet, Chew 500 mg 3 (Three) Times a Day., Disp: , Rfl:     cholecalciferol (VITAMIN D3) 25 MCG (1000 UT) tablet, Take 1 tablet by mouth Daily., Disp: , Rfl:     Dextromethorphan-guaiFENesin (MUCINEX DM PO), Take 2 tablets by mouth 2 (Two) Times a Day., Disp: , Rfl:     dicyclomine (BENTYL) 10 MG capsule, Take 1 capsule by mouth 3 (Three) Times a Day.,  Disp: 270 capsule, Rfl: 3    diphenhydrAMINE (Benadryl Allergy) 25 MG tablet, Take 1-2 tablets by mouth Every 4-6 Hours As Needed., Disp: 90 tablet, Rfl: 11    Erenumab-aooe (Aimovig) 140 MG/ML auto-injector, Inject 1 mL under the skin into the appropriate area as directed Every 30 (Thirty) Days., Disp: 1 mL, Rfl: 5    estradiol (ESTRACE) 0.1 MG/GM vaginal cream, Insert 0.5 grams vaginally twice weekly, Disp: 42.5 g, Rfl: 5    ferrous sulfate 325 (65 FE) MG EC tablet, Take 2 tablets by mouth Daily With Breakfast., Disp: , Rfl:     fexofenadine (ALLEGRA) 180 MG tablet, Take 1 tablet by mouth Daily., Disp: , Rfl:     fluticasone (FLONASE) 50 MCG/ACT nasal spray, Instill 1 spray into each nostril as directed by provider Daily., Disp: 16 g, Rfl: 1    furosemide (LASIX) 40 MG tablet, Take 1 tablet by mouth Daily, twice a month (Patient taking differently: Take 1 tablet by mouth Daily. With infusions), Disp: 30 tablet, Rfl: 1    glucosamine-chondroitin 500-400 MG capsule capsule, Take 1 capsule by mouth 2 (Two) Times a Day With Meals., Disp: , Rfl:     Hydrocod Gabriel-Chlorphe Gabriel ER (TUSSIONEX PENNKINETIC) 10-8 MG/5ML ER suspension, Take 5 mL by mouth Every 12 (Twelve) Hours As Needed (severe cough)., Disp: 30 mL, Rfl: 0    Hypertonic Nasal Wash (Sinus Rinse Kit) pack, use once or twice daily as needed, Disp: 1 each, Rfl: 3    immune globulin, human, (Gammagard) 20 GM/200ML solution infusion, Infuse 40 g into a venous catheter Every 28 (Twenty-Eight) Days., Disp: 400 mL, Rfl: 0    ipratropium (ATROVENT) 0.06 % nasal spray, Instill 1-2 sprays each nostril 2-3 times daily as needed, Disp: 15 mL, Rfl: 3    ipratropium-albuterol (DUO-NEB) 0.5-2.5 mg/3 ml nebulizer, Inhale the contents of 1 vial through a nebulizer every 4-6 hours as needed for cough,wheezing and shortness of breath., Disp: 90 mL, Rfl: 3    isosorbide mononitrate (IMDUR) 30 MG 24 hr tablet, Take 1 tablet by mouth Every Morning., Disp: 90 tablet, Rfl: 3     levothyroxine (SYNTHROID, LEVOTHROID) 50 MCG tablet, Take 1 tablet by mouth Daily., Disp: 90 tablet, Rfl: 3    Magnesium 250 MG tablet, Take 2 tablets by mouth 2 (Two) Times a Day., Disp: , Rfl:     Mepolizumab (Nucala) 100 MG/ML solution auto-injector, Inject 1 mL under the skin into the appropriate area as directed Every 28 (Twenty-Eight) Days. Obtaining medication from [place] to [facility] PAP, Disp: , Rfl:     montelukast (SINGULAIR) 10 MG tablet, Take 1 tablet by mouth every night at bedtime., Disp: 90 tablet, Rfl: 3    montelukast (SINGULAIR) 10 MG tablet, Take 1 tablet by mouth every night at bedtime., Disp: 30 tablet, Rfl: 11    multivitamin with minerals tablet tablet, Take 1 tablet by mouth Daily., Disp: , Rfl:     omeprazole (priLOSEC) 40 MG capsule, Take 1 capsule by mouth 2 (Two) Times a Day., Disp: 180 capsule, Rfl: 3    ondansetron (ZOFRAN) 4 MG tablet, Take 1 tablet by mouth Every 8 (Eight) Hours As Needed for Nausea or Vomiting., Disp: 30 tablet, Rfl: 11    ondansetron (ZOFRAN) 4 MG tablet, Take 1 tablet by mouth Every 8 (Eight) Hours As Needed for Nausea or Vomiting., Disp: 30 tablet, Rfl: 11    POTASSIUM CHLORIDE PO, Take 20 mEq by mouth 2 (Two) Times a Day As Needed. Takes two times a month with Lasix, Disp: , Rfl:     potassium citrate (UROCIT-K) 10 MEQ (1080 MG) CR tablet, Take 1 tablet by mouth Daily., Disp: 90 tablet, Rfl: 3    predniSONE (DELTASONE) 20 MG tablet, Take 2 tablets by mouth Daily., Disp: 10 tablet, Rfl: 0    predniSONE (DELTASONE) 20 MG tablet, Take 3 tablets by mouth one daily for 5 days, then 2 tablets by mouth daily for 5 days, then 1 tablet by mouth daily for 5 days, Disp: 30 tablet, Rfl: 0    tiZANidine (ZANAFLEX) 4 MG tablet, Take 1 tablet by mouth Every Night. (Patient taking differently: Take 1 tablet by mouth Every Night. 1/2 tablet as needed in addition to nightly with Ubrelvy), Disp: 90 tablet, Rfl: 1    ubrogepant (Ubrelvy) 100 MG tablet, Take 1 tablet by mouth 1 (One)  "Time As Needed (migraine)., Disp: 7 tablet, Rfl: 0    valsartan (DIOVAN) 160 MG tablet, Take 1 tablet by mouth Daily., Disp: 90 tablet, Rfl: 3    vitamin B-12 (CYANOCOBALAMIN) 1000 MCG tablet, Take 1 tablet by mouth Daily., Disp: , Rfl:     acetaminophen-codeine (TYLENOL with CODEINE #3) 300-30 MG per tablet, Take 1 tablet by mouth Every 4 (Four) Hours As Needed for Moderate Pain., Disp: 30 tablet, Rfl: 0    clarithromycin (BIAXIN) 500 MG tablet, Take 1 tablet by mouth Every 12 (Twelve) Hours., Disp: 20 tablet, Rfl: 0    cloNIDine (Catapres) 0.1 MG tablet, Take 1 tablet by mouth 2 (Two) Times a Day., Disp: 30 tablet, Rfl: 5    Objective      Blood pressure 140/82, pulse 88, temperature 97.9 °F (36.6 °C), resp. rate 16, height 162.6 cm (64\"), weight 94.8 kg (209 lb), SpO2 95%, not currently breastfeeding.            Physical Exam     General Appearance:    Alert, cooperative, no distress, appears stated age   Head:    Normocephalic, without obvious abnormality, atraumatic   Eyes:    PERRL, conjunctiva/corneas clear, EOM's intact   Ears:    Normal TM's and external ear canals, both ears   Nose:   Nares normal, septum midline, mucosa normal, no drainage   or sinus tenderness   Throat:   Lips, mucosa, and tongue normal; teeth and gums normal   Neck:   Supple, symmetrical, trachea midline, no adenopathy;        thyroid:  No enlargement/tenderness/nodules; no carotid    bruit or JVD   Back:     Symmetric, no curvature, ROM normal, no CVA tenderness   Lungs:     Clear to auscultation bilaterally, respirations unlabored   Chest wall:    No tenderness or deformity   Heart:    Regular rate and rhythm, S1 and S2 normal, no murmur,        rub or gallop   Abdomen:     Soft, non-tender, bowel sounds active all four quadrants,     no masses, no organomegaly   Extremities:   Extremities normal, atraumatic, no cyanosis or edema   Pulses:   2+ and symmetric all extremities   Skin:   Skin color, texture, turgor normal, no rashes or " lesions   Lymph nodes:   Cervical, supraclavicular, and axillary nodes normal   Neurologic:   CNII-XII intact. Normal strength, sensation and reflexes       throughout      Results for orders placed or performed in visit on 03/13/24   Adult Transthoracic Echo Complete W/ Cont if Necessary Per Protocol   Result Value Ref Range    LVIDd 4.4 cm    LVIDs 2.7 cm    IVSd 1.24 cm    LVPWd 1.42 cm    FS 39.0 %    IVS/LVPW 0.87 cm    ESV(cubed) 19.2 ml    LV Sys Vol (BSA corrected) 16.9 cm2    EDV(cubed) 84.6 ml    LV Lezama Vol (BSA corrected) 60.8 cm2    LV mass(C)d 221.7 grams    LVOT area 2.5 cm2    LVOT diam 1.80 cm    EDV(MOD-sp4) 120.0 ml    ESV(MOD-sp4) 33.4 ml    SV(MOD-sp4) 86.6 ml    SI(MOD-sp4) 43.8 ml/m2    EF(MOD-sp4) 72.2 %    MV E max scot 68.9 cm/sec    MV A max scot 119.0 cm/sec    MV dec time 0.34 sec    MV E/A 0.58     LA ESV Index (BP) 31.5 ml/m2    Med Peak E' Scot 4.6 cm/sec    Lat Peak E' Scot 6.5 cm/sec    TR max scot 276.0 cm/sec    Avg E/e' ratio 12.41     SV(LVOT) 74.8 ml    TAPSE (>1.6) 3.1 cm    RV S' 15.0 cm/sec    LA dimension (2D)  5.4 cm    LV V1 max 132.0 cm/sec    LV V1 max PG 7.0 mmHg    LV V1 mean PG 3.0 mmHg    LV V1 VTI 29.4 cm    Ao pk scot 357.0 cm/sec    Ao max PG 51.0 mmHg    Ao mean PG 28.3 mmHg    Ao V2 VTI 81.7 cm    DOMENIC(I,D) 0.92 cm2    AI P1/2t 967.2 msec    MV max PG 7.5 mmHg    MV mean PG 2.00 mmHg    MV V2 VTI 34.8 cm    MVA(VTI) 2.15 cm2    MV dec slope 199.0 cm/sec2    TR max PG 30.5 mmHg    RVSP(TR) 40.5 mmHg    RAP systole 10.0 mmHg    PA V2 max 90.7 cm/sec    Ao root diam 1.80 cm     *Note: Due to a large number of results and/or encounters for the requested time period, some results have not been displayed. A complete set of results can be found in Results Review.         Assessment & Plan   Study ordered by:  Lakeshia Aguilera APRN     Diagnosis after study:  Obstructive Sleep Apnea. Previously established  Good response to CPAP titration.  Poor Sleep Efficiency.      Impression:  The patient was tried on CPAP. A good response was seen at a final pressure of 6  cm. The patient was studied at that pressure for 304 minutes of sleep.  Sleep efficiency was poor.     Recommendations:  Patient's Body mass index is 41.6 kg/m². which suggests that weight loss maybe beneficial.  It will need to be confirmed that she has been set up on CPAP at 6 cm.  The patient had decreased sleep efficiency in the lab. This may be attributable to the unfamiliarity of the lab environment (the 'first night effect').  If insomnia is a clinical concern, sleeping aids may need to be considered  If insomnia is a consideration, a trial of sleeping aid may need to be considered.  Other causes of insomnia will need to be considered and ruled out, if clinically appropriate.     The above recommendations will need to be discussed with the patient, by the ordering provider's office.      Follow up:  Patient will need to follow up with ordering provider. This will need to be arranged by the ordering provider's office.      Clinical Information:  Please review progress notes from the ordering provider for details on clinical information.      Please feel free to contact the office of [facility] at [phone], if you have any questions about this study.     Best regards,      tried melatonin.     Hard to sleep.  Prednisone keeping her awake.  Home sleep study is neg but unfortunately  that means it is inconclusive.  No record of first sleep study. Will need new inpt study or just restart cpap.   Pt has lost a lot of wt 347 now 209.        I'm concerned about her clonazepam. Taking at night for sleep.  Likely neg for barry since wt loss.   I would like her to have a formal ss. Pt  concerned about co pay cost.     Allergy. See's Dr Garza. Will need to be allergy tested. She will ask.   On Nucala.  Still with scattered wheezing.    Cough with green  sputum  Will treat.     Will changed klonopin to clonidine for sleep.   Add melatonin.               Diagnoses and all orders for this visit:    1. Primary insomnia (Primary)  -     cloNIDine (Catapres) 0.1 MG tablet; Take 1 tablet by mouth 2 (Two) Times a Day.  Dispense: 30 tablet; Refill: 5    2. Exacerbation of asthma, unspecified asthma severity, unspecified whether persistent  -     clarithromycin (BIAXIN) 500 MG tablet; Take 1 tablet by mouth Every 12 (Twelve) Hours.  Dispense: 20 tablet; Refill: 0    3. Moderate asthma with exacerbation, unspecified whether persistent  -     clarithromycin (BIAXIN) 500 MG tablet; Take 1 tablet by mouth Every 12 (Twelve) Hours.  Dispense: 20 tablet; Refill: 0    4. Chronic bilateral low back pain without sciatica  -     acetaminophen-codeine (TYLENOL with CODEINE #3) 300-30 MG per tablet; Take 1 tablet by mouth Every 4 (Four) Hours As Needed for Moderate Pain.  Dispense: 30 tablet; Refill: 0      No follow-ups on file.          There are no Patient Instructions on file for this visit.     Sanjeev Rawls MD    Assessment & Plan

## 2024-03-29 ENCOUNTER — HOSPITAL ENCOUNTER (OUTPATIENT)
Dept: INFUSION THERAPY | Facility: HOSPITAL | Age: 69
Discharge: HOME | End: 2024-03-29
Payer: MEDICARE

## 2024-03-29 VITALS
DIASTOLIC BLOOD PRESSURE: 86 MMHG | SYSTOLIC BLOOD PRESSURE: 136 MMHG | OXYGEN SATURATION: 96 % | HEART RATE: 78 BPM | TEMPERATURE: 97.7 F | BODY MASS INDEX: 35.87 KG/M2 | RESPIRATION RATE: 18 BRPM | WEIGHT: 209 LBS

## 2024-03-29 DIAGNOSIS — D80.1 COMMON VARIABLE AGAMMAGLOBULINEMIA: ICD-10-CM

## 2024-03-29 DIAGNOSIS — Z95.828 PORT-A-CATH IN PLACE: Primary | ICD-10-CM

## 2024-03-29 LAB
ALBUMIN SERPL-MCNC: 4.1 G/DL (ref 3.5–5.2)
ALBUMIN/GLOB SERPL: 1.4 G/DL
ALP SERPL-CCNC: 61 U/L (ref 39–117)
ALT SERPL W P-5'-P-CCNC: 22 U/L (ref 1–33)
ANION GAP SERPL CALCULATED.3IONS-SCNC: 12.4 MMOL/L (ref 5–15)
AST SERPL-CCNC: 26 U/L (ref 1–32)
BASOPHILS # BLD AUTO: 0.13 10*3/MM3 (ref 0–0.2)
BASOPHILS NFR BLD AUTO: 1.4 % (ref 0–1.5)
BILIRUB SERPL-MCNC: 0.3 MG/DL (ref 0–1.2)
BUN SERPL-MCNC: 16 MG/DL (ref 8–23)
BUN/CREAT SERPL: 14.7 (ref 7–25)
CALCIUM SPEC-SCNC: 8.7 MG/DL (ref 8.6–10.5)
CHLORIDE SERPL-SCNC: 93 MMOL/L (ref 98–107)
CO2 SERPL-SCNC: 26.6 MMOL/L (ref 22–29)
CREAT SERPL-MCNC: 1.09 MG/DL (ref 0.57–1)
DEPRECATED RDW RBC AUTO: 44.1 FL (ref 37–54)
EGFRCR SERPLBLD CKD-EPI 2021: 55.4 ML/MIN/1.73
EOSINOPHIL # BLD AUTO: 0.05 10*3/MM3 (ref 0–0.4)
EOSINOPHIL NFR BLD AUTO: 0.5 % (ref 0.3–6.2)
ERYTHROCYTE [DISTWIDTH] IN BLOOD BY AUTOMATED COUNT: 12.7 % (ref 12.3–15.4)
GLOBULIN UR ELPH-MCNC: 2.9 GM/DL
GLUCOSE SERPL-MCNC: 110 MG/DL (ref 65–99)
HCT VFR BLD AUTO: 37.1 % (ref 34–46.6)
HGB BLD-MCNC: 12.6 G/DL (ref 12–15.9)
IGG1 SER-MCNC: 1102 MG/DL (ref 700–1600)
IMM GRANULOCYTES # BLD AUTO: 0.36 10*3/MM3 (ref 0–0.05)
IMM GRANULOCYTES NFR BLD AUTO: 3.9 % (ref 0–0.5)
LYMPHOCYTES # BLD AUTO: 2.84 10*3/MM3 (ref 0.7–3.1)
LYMPHOCYTES NFR BLD AUTO: 30.7 % (ref 19.6–45.3)
MCH RBC QN AUTO: 32.1 PG (ref 26.6–33)
MCHC RBC AUTO-ENTMCNC: 34 G/DL (ref 31.5–35.7)
MCV RBC AUTO: 94.4 FL (ref 79–97)
MONOCYTES # BLD AUTO: 0.73 10*3/MM3 (ref 0.1–0.9)
MONOCYTES NFR BLD AUTO: 7.9 % (ref 5–12)
NEUTROPHILS NFR BLD AUTO: 5.14 10*3/MM3 (ref 1.7–7)
NEUTROPHILS NFR BLD AUTO: 55.6 % (ref 42.7–76)
NRBC BLD AUTO-RTO: 0 /100 WBC (ref 0–0.2)
PLATELET # BLD AUTO: 236 10*3/MM3 (ref 140–450)
PMV BLD AUTO: 9 FL (ref 6–12)
POTASSIUM SERPL-SCNC: 4.1 MMOL/L (ref 3.5–5.2)
PROT SERPL-MCNC: 7 G/DL (ref 6–8.5)
RBC # BLD AUTO: 3.93 10*6/MM3 (ref 3.77–5.28)
SODIUM SERPL-SCNC: 132 MMOL/L (ref 136–145)
WBC NRBC COR # BLD AUTO: 9.25 10*3/MM3 (ref 3.4–10.8)

## 2024-03-29 PROCEDURE — 25010000002 HEPARIN LOCK FLUSH PER 10 UNITS: Performed by: ALLERGY & IMMUNOLOGY

## 2024-03-29 PROCEDURE — 36591 DRAW BLOOD OFF VENOUS DEVICE: CPT

## 2024-03-29 PROCEDURE — 63710000001 DIPHENHYDRAMINE PER 50 MG: Performed by: ALLERGY & IMMUNOLOGY

## 2024-03-29 PROCEDURE — A9270 NON-COVERED ITEM OR SERVICE: HCPCS | Performed by: ALLERGY & IMMUNOLOGY

## 2024-03-29 PROCEDURE — 85025 COMPLETE CBC W/AUTO DIFF WBC: CPT | Performed by: ALLERGY & IMMUNOLOGY

## 2024-03-29 PROCEDURE — 96523 IRRIG DRUG DELIVERY DEVICE: CPT

## 2024-03-29 PROCEDURE — 25010000002 IMMUNE GLOBULIN (HUMAN) 20 GM/200ML SOLUTION: Performed by: ALLERGY & IMMUNOLOGY

## 2024-03-29 PROCEDURE — 96366 THER/PROPH/DIAG IV INF ADDON: CPT

## 2024-03-29 PROCEDURE — 82784 ASSAY IGA/IGD/IGG/IGM EACH: CPT | Performed by: ALLERGY & IMMUNOLOGY

## 2024-03-29 PROCEDURE — 80053 COMPREHEN METABOLIC PANEL: CPT | Performed by: ALLERGY & IMMUNOLOGY

## 2024-03-29 PROCEDURE — 25810000003 SODIUM CHLORIDE 0.9 % SOLUTION: Performed by: ALLERGY & IMMUNOLOGY

## 2024-03-29 PROCEDURE — 96365 THER/PROPH/DIAG IV INF INIT: CPT

## 2024-03-29 RX ORDER — METHYLPREDNISOLONE SODIUM SUCCINATE 125 MG/2ML
50 INJECTION, POWDER, LYOPHILIZED, FOR SOLUTION INTRAMUSCULAR; INTRAVENOUS ONCE AS NEEDED
Start: 2024-04-26

## 2024-03-29 RX ORDER — HEPARIN SODIUM (PORCINE) LOCK FLUSH IV SOLN 100 UNIT/ML 100 UNIT/ML
500 SOLUTION INTRAVENOUS AS NEEDED
OUTPATIENT
Start: 2024-03-29

## 2024-03-29 RX ORDER — ACETAMINOPHEN 325 MG/1
325 TABLET ORAL ONCE
Status: DISCONTINUED | OUTPATIENT
Start: 2024-03-29 | End: 2024-03-31 | Stop reason: HOSPADM

## 2024-03-29 RX ORDER — EPINEPHRINE 1 MG/ML
0.3 INJECTION, SOLUTION INTRAMUSCULAR; SUBCUTANEOUS ONCE AS NEEDED
Start: 2024-04-26

## 2024-03-29 RX ORDER — PREDNISONE 20 MG/1
40 TABLET ORAL ONCE
Start: 2024-04-26 | End: 2024-04-26

## 2024-03-29 RX ORDER — SODIUM CHLORIDE 0.9 % (FLUSH) 0.9 %
10 SYRINGE (ML) INJECTION AS NEEDED
OUTPATIENT
Start: 2024-03-29

## 2024-03-29 RX ORDER — DIPHENHYDRAMINE HCL 25 MG
50 CAPSULE ORAL ONCE AS NEEDED
Start: 2024-04-26

## 2024-03-29 RX ORDER — ACETAMINOPHEN 325 MG/1
325 TABLET ORAL ONCE
OUTPATIENT
Start: 2024-04-26

## 2024-03-29 RX ORDER — DIPHENHYDRAMINE HCL 25 MG
50 CAPSULE ORAL ONCE
Start: 2024-04-26 | End: 2024-04-26

## 2024-03-29 RX ORDER — PREDNISONE 20 MG/1
40 TABLET ORAL ONCE AS NEEDED
Start: 2024-04-26

## 2024-03-29 RX ORDER — PREDNISONE 20 MG/1
40 TABLET ORAL ONCE
Status: DISCONTINUED | OUTPATIENT
Start: 2024-03-29 | End: 2024-03-31 | Stop reason: HOSPADM

## 2024-03-29 RX ORDER — SODIUM CHLORIDE 0.9 % (FLUSH) 0.9 %
10 SYRINGE (ML) INJECTION AS NEEDED
Status: DISCONTINUED | OUTPATIENT
Start: 2024-03-29 | End: 2024-03-31 | Stop reason: HOSPADM

## 2024-03-29 RX ORDER — DIPHENHYDRAMINE HCL 25 MG
50 CAPSULE ORAL ONCE
Status: COMPLETED | OUTPATIENT
Start: 2024-03-29 | End: 2024-03-29

## 2024-03-29 RX ORDER — HEPARIN SODIUM (PORCINE) LOCK FLUSH IV SOLN 100 UNIT/ML 100 UNIT/ML
500 SOLUTION INTRAVENOUS AS NEEDED
Status: DISCONTINUED | OUTPATIENT
Start: 2024-03-29 | End: 2024-03-31 | Stop reason: HOSPADM

## 2024-03-29 RX ADMIN — Medication 10 ML: at 13:07

## 2024-03-29 RX ADMIN — IMMUNE GLOBULIN INFUSION (HUMAN) 20 G: 100 INJECTION, SOLUTION INTRAVENOUS; SUBCUTANEOUS at 11:23

## 2024-03-29 RX ADMIN — Medication 10 ML: at 13:08

## 2024-03-29 RX ADMIN — DIPHENHYDRAMINE HYDROCHLORIDE 50 MG: 25 CAPSULE ORAL at 08:34

## 2024-03-29 RX ADMIN — HEPARIN 500 UNITS: 100 SYRINGE at 13:08

## 2024-03-29 RX ADMIN — SODIUM CHLORIDE 500 ML: 9 INJECTION, SOLUTION INTRAVENOUS at 08:53

## 2024-03-29 RX ADMIN — IMMUNE GLOBULIN INFUSION (HUMAN) 20 G: 100 INJECTION, SOLUTION INTRAVENOUS; SUBCUTANEOUS at 09:29

## 2024-03-29 NOTE — CODE DOCUMENTATION
Port accessed per sterile technique x1 attempt. Lab specimens drawn via port and taken to inpatient lab for processing.  Patient tolerated as well.

## 2024-03-29 NOTE — H&P (VIEW-ONLY)
Subjective   Raquel Mariee is a 68 y.o. female.   Chief Complaint   Patient presents with    Hemorrhoids        History of Present Illness   Ms. Mariee is a 68-year-old female patient referred for evaluation of hemorrhoidal disease.  She reports a long history of hemorrhoids with previous hemorrhoid bandings in the past.  She more recently has had increased coughing secondary to her underlying asthma and feels that this has exacerbated her symptoms.  She has tried numerous over-the-counter preparations without improvement.  She does have some occasional bright red blood per rectum with wiping.      The patient's most recent colonoscopy was performed on 1/20/2022 by Dr. Pancho Amador at [facility].  This was noted to be a high risk colon cancer surveillance exam with a personal history of colon polyps, and a family history of colon polyps.  Patient was noted to have multiple small and large mouth diverticula in the entire colon without evidence of bleeding.  A 6 mm polyp was removed with a cold snare from the transverse colon.  Pathology demonstrated this polyp to be a tubular adenoma.  Nonbleeding internal hemorrhoids were seen on retroflexed exam and noted to be of moderate size.      The following portions of the patient's history were reviewed and updated as appropriate: allergies, current medications, past family history, past medical history, past social history, past surgical history, and problem list.    Patient Active Problem List   Diagnosis    Hypogammaglobulinemia    Gastric bypass status for obesity    CKD (chronic kidney disease), stage III    KALYN (obstructive sleep apnea)    Major depressive disorder in partial remission    Hypothyroidism    Fibromyalgia syndrome    Abdominal aortic aneurysm (AAA) without rupture    Nonrheumatic aortic valve stenosis    Vitamin D deficiency, unspecified     Anatomical narrow angle borderline glaucoma    Abnormal finding of blood chemistry, unspecified      Chronic diastolic heart failure    Prinzmetal angina    Common variable agammaglobulinemia    Polymyositis    Port-A-Cath in place    Abnormal CT of the chest    Acute bronchospasm    Acute kidney injury    Sinusitis, chronic    Anemia    Aortic valve stenosis and insufficiency, rheumatic    Body mass index (BMI) of 40.0 to 44.9 in adult    Calcium kidney stones    Choroidal nevus of left eye    CAD (coronary artery disease)    Claw toe, acquired    Combined forms of age-related cataract of both eyes    Depressive disorder    Essential hypertension with goal blood pressure less than 140/90    Severe persistent asthma with exacerbation    Glaucoma suspect of both eyes    Inappropriate sinus tachycardia    Migraine without aura    Mitral valve regurgitation    Osteoarthrosis involving lower leg    Pain in joint involving lower leg    Posterior vitreous detachment of both eyes    Primary osteoarthritis of left wrist    Anxiety disorder    Status post total knee replacement    Sleep disturbances    Senile nuclear sclerosis    Rotator cuff syndrome of left shoulder    Pure hypercholesterolemia    Thoracic aortic aneurysm without rupture    TIA (transient ischemic attack)    Tracheomalacia    Trochlear nerve palsy, left    Diabetes mellitus without complication    Urge incontinence of urine    Acute exacerbation of chronic obstructive pulmonary disease (COPD)    Internal hemorrhoid, bleeding       Past Medical History:   Diagnosis Date    Anxiety     Aortic valve stenosis     Cardiac murmur     Cataract, bilateral     CHF (congestive heart failure)     Chronic kidney disease     Stage III    Clostridium difficile infection     in her 30s    Common variable immunodeficiency     IVIG infusions    COPD (chronic obstructive pulmonary disease)     Depression     Diabetes mellitus     type II    Eosinophilic asthma     Fibromyalgia, primary     Full dentures     GERD (gastroesophageal reflux disease)     History of transfusion      in [facility] at [facility].  No reaction noted, patient states she does have an antibody from transfusion.    Hypertension     Hypogammaglobulinemia     Hypothyroidism     Irritable bowel syndrome     Migraines     Morbid obesity     Osteoarthritis     Prinzmetal angina 1990    Pseudomonas infection 1998    lungs    PTSD (post-traumatic stress disorder)     Pulmonary edema     Renal insufficiency     Sinus tachycardia 1990    Sleep apnea     no longer uses/needs cpap    Tachycardia     TIA (transient ischemic attack) 2017    Trochanteric bursitis 01/25/2023       Past Surgical History:   Procedure Laterality Date    APPENDECTOMY      BUNIONECTOMY Bilateral     CARDIAC CATHETERIZATION  2017    no stents needed    CARPAL TUNNEL RELEASE Right     COLONOSCOPY  2021    ENDOMETRIAL ABLATION  1997    EYE SURGERY  ? About 6-8 years ago    Laser for glaucoma    FRACTURE SURGERY  1995    No hardware; Tibeal plateau    GASTRIC BYPASS      HAND SURGERY Left     No hardware    INTERSTIM PLACEMENT N/A 05/03/2023    Procedure: INTERSTIM STAGES 1 AND 2 LEAD AND GENERATOR PLACEMENT;  Surgeon: Abner Nation MD;  Location: [facility] OR;  Service: Urology;  Laterality: N/A;    POSTERIOR VAGINAL REPAIR N/A 08/02/2023    Procedure: POSTERIOR COLPORRHAPHY;  Surgeon: Kellen Galan MD;  Location: [facility] OR;  Service: Gynecology;  Laterality: N/A;    REPLACEMENT TOTAL KNEE BILATERAL      TEETH EXTRACTION      all of bottom teeth removed    THORACOTOMY      TONSILLECTOMY      TOTAL ABDOMINAL HYSTERECTOMY WITH SALPINGO OOPHORECTOMY      TRACHEAL SURGERY      Repaired via thoracotomy    TUBAL ABDOMINAL LIGATION  1983    VENOUS ACCESS DEVICE (PORT) INSERTION      port a cath in the left chest wall       Medications:   No current facility-administered medications on file prior to visit.     Current Outpatient Medications on File Prior to Visit   Medication Sig    acetaminophen-codeine (TYLENOL with CODEINE #3) 300-30 MG  per tablet Take 1 tablet by mouth Every 4 (Four) Hours As Needed for Moderate Pain.    albuterol sulfate  (90 Base) MCG/ACT inhaler Inhale 2 puffs by mouth every 4 to 6 hours as needed for cough, wheeze, shortness of breath. Use with vortex spacer    allopurinol (ZYLOPRIM) 100 MG tablet Take 1 tablet by mouth Daily.    betamethasone valerate (VALISONE) 0.1 % cream Apply topically to the appropriate area as directed Daily. (Patient taking differently: Apply  topically to the appropriate area as directed Daily As Needed.)    buPROPion SR (WELLBUTRIN SR) 150 MG 12 hr tablet Take 1 tablet by mouth 2 (Two) Times a Day.    calcium carbonate (Antacid Maximum) 1000 MG chewable tablet Chew 500 mg 3 (Three) Times a Day.    cholecalciferol (VITAMIN D3) 25 MCG (1000 UT) tablet Take 1 tablet by mouth Daily.    clarithromycin (BIAXIN) 500 MG tablet Take 1 tablet by mouth Every 12 (Twelve) Hours. (Patient not taking: Reported on 4/8/2024)    cloNIDine (Catapres) 0.1 MG tablet Take 1 tablet by mouth 2 (Two) Times a Day. (Patient taking differently: Take 0.5 tablets by mouth every night at bedtime.)    Dextromethorphan-guaiFENesin (MUCINEX DM PO) Take 2 tablets by mouth 2 (Two) Times a Day.    dicyclomine (BENTYL) 10 MG capsule Take 1 capsule by mouth 3 (Three) Times a Day.    diphenhydrAMINE (Benadryl Allergy) 25 MG tablet Take 1-2 tablets by mouth Every 4-6 Hours As Needed.    Erenumab-aooe (Aimovig) 140 MG/ML auto-injector Inject 1 mL under the skin into the appropriate area as directed Every 30 (Thirty) Days.    estradiol (ESTRACE) 0.1 MG/GM vaginal cream Insert 0.5 grams vaginally twice weekly    ferrous sulfate 325 (65 FE) MG EC tablet Take 2 tablets by mouth Daily With Breakfast.    fexofenadine (ALLEGRA) 180 MG tablet Take 1 tablet by mouth Daily.    fluticasone (FLONASE) 50 MCG/ACT nasal spray Instill 1 spray into each nostril as directed by provider Daily.    furosemide (LASIX) 40 MG tablet Take 1 tablet by mouth  Daily, twice a month (Patient taking differently: Take 1 tablet by mouth Daily. With infusions)    glucosamine-chondroitin 500-400 MG capsule capsule Take 1 capsule by mouth 2 (Two) Times a Day With Meals.    Hypertonic Nasal Wash (Sinus Rinse Kit) pack use once or twice daily as needed    immune globulin, human, (Gammagard) 20 GM/200ML solution infusion Infuse 40 g into a venous catheter Every 28 (Twenty-Eight) Days.    ipratropium (ATROVENT) 0.06 % nasal spray Instill 1-2 sprays each nostril 2-3 times daily as needed    ipratropium-albuterol (DUO-NEB) 0.5-2.5 mg/3 ml nebulizer Inhale the contents of 1 vial through a nebulizer every 4-6 hours as needed for cough,wheezing and shortness of breath.    isosorbide mononitrate (IMDUR) 30 MG 24 hr tablet Take 1 tablet by mouth Every Morning.    levothyroxine (SYNTHROID, LEVOTHROID) 50 MCG tablet Take 1 tablet by mouth Daily.    Magnesium 250 MG tablet Take 2 tablets by mouth 2 (Two) Times a Day.    Mepolizumab (Nucala) 100 MG/ML solution auto-injector Inject 1 mL under the skin into the appropriate area as directed Every 28 (Twenty-Eight) Days. Obtaining medication from [facility] PAP    montelukast (SINGULAIR) 10 MG tablet Take 1 tablet by mouth every night at bedtime.    multivitamin with minerals tablet tablet Take 1 tablet by mouth Daily.    omeprazole (priLOSEC) 40 MG capsule Take 1 capsule by mouth 2 (Two) Times a Day.    ondansetron (ZOFRAN) 4 MG tablet Take 1 tablet by mouth Every 8 (Eight) Hours As Needed for Nausea or Vomiting.    POTASSIUM CHLORIDE PO Take 20 mEq by mouth Take As Directed. Takes two times a month with Lasix    potassium citrate (UROCIT-K) 10 MEQ (1080 MG) CR tablet Take 1 tablet by mouth Daily.    tiZANidine (ZANAFLEX) 4 MG tablet Take 1 tablet by mouth Every Night. (Patient taking differently: Take 1 tablet by mouth Every Night. 1/2 tablet as needed in addition to nightly with Ubrelvy)    ubrogepant (Ubrelvy) 100 MG tablet Take 1 tablet  by mouth 1 (One) Time As Needed (migraine).    valsartan (DIOVAN) 160 MG tablet Take 1 tablet by mouth Daily.    vitamin B-12 (CYANOCOBALAMIN) 1000 MCG tablet Take 1 tablet by mouth Daily.    [DISCONTINUED] fluticasone-salmeterol (Advair HFA) 230-21 MCG/ACT inhaler Inhale 2 puffs by mouth 2 (Two) Times a Day.    [DISCONTINUED] tiotropium bromide monohydrate (Spiriva Respimat) 2.5 MCG/ACT aerosol solution inhaler Inhale 2 puffs by mouth daily         Allergies:   Allergies   Allergen Reactions    Cefaclor Hives and Swelling     Other reaction(s): SWELLING  Shed 4 layers of skin and extremely SOB  Cesario Bishop's syndrome        Cephalexin Other (See Comments)     Cesario-Bishop's syndrome      Cephalosporins Hives, Swelling and Angioedema     Rechallenge with cefipime caused rash on 1/14/08.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    Verapamil Nausea And Vomiting     Dizzy    Zolpidem Other (See Comments) and Unknown (See Comments)     Automatic behaviour in sleep.  confusion    Amoxicillin Rash    Edecrin [Ethacrynic Acid] Rash and Other (See Comments)     Weight gain    Hydrochlorothiazide Hives    Sulfa Antibiotics Hives         Family History   Problem Relation Age of Onset    Diabetes Mother     Stroke Mother     Heart disease Mother     Hypertension Mother     Endometriosis Mother     Depression Mother     Diabetes Father     Hypertension Father     Stroke Father     Heart attack Father     Asthma Father      COPD Father     Dementia Father     Heart disease Father     COPD Sister     Asthma Sister     Cancer Sister         Nasal cell skin    Brain cancer Sister     Diabetes Sister     Stroke Sister     Heart attack Sister     Endometriosis Sister     Depression Sister     Arthritis Sister         Rheumatoid    Hypertension Sister     Kidney disease Sister     Diabetes Sister     COPD Sister     Asthma Sister     Endometriosis Sister     Depression Sister     Cancer Sister         Glioma    Heart disease Sister     Heart attack Sister     Endometriosis Sister     Asthma Sister     Hypertension Sister     Kidney disease Sister         ESRD    COPD Brother     Asthma Brother         Turned into COPD    Diabetes Brother     Asthma Brother     COPD Brother     Diabetes Brother     Hypertension Brother     Asthma Daughter     Endometriosis Daughter     Osteoarthritis Daughter     Hypertension Daughter     Kidney failure Daughter     Irritable bowel syndrome Daughter     Anxiety disorder Daughter     Bipolar disorder Daughter     Depression Daughter     Self-Injurious Behavior  Daughter     Suicide Attempts Daughter     Mental illness Daughter         Bipolar and eating fisorder    Asthma Daughter     Endometriosis Daughter     Osteoarthritis Daughter     Asthma Son     ADD / ADHD Son     Alcohol abuse Son     Drug abuse Son     Breast cancer Maternal Cousin     Heart disease Maternal Grandfather     Heart disease Maternal Uncle     OCD Neg Hx     Paranoid behavior Neg Hx     Schizophrenia Neg Hx     Seizures Neg Hx     Ovarian cancer Neg Hx        Social History     Socioeconomic History    Marital status:    Tobacco Use    Smoking status: Never     Passive exposure: Never    Smokeless tobacco: Never   Vaping Use    Vaping status: Never Used   Substance and Sexual Activity    Alcohol use: Yes     Comment: ONCE A YEAR    Drug use: Never    Sexual activity: Defer       Review of Systems   Constitutional:  Negative for  "diaphoresis, unexpected weight gain and unexpected weight loss.   HENT:  Negative for hearing loss, trouble swallowing and voice change.    Eyes:  Negative for visual disturbance.   Respiratory:  Negative for apnea, cough, chest tightness, shortness of breath and wheezing.    Cardiovascular:  Negative for chest pain, palpitations and leg swelling.   Gastrointestinal:  Positive for rectal pain. Negative for abdominal distention, abdominal pain, anal bleeding, blood in stool, constipation, diarrhea, nausea, vomiting, GERD and indigestion.   Endocrine: Negative for cold intolerance and heat intolerance.   Genitourinary:  Negative for difficulty urinating, dysuria and flank pain.   Musculoskeletal:  Negative for back pain and gait problem.   Skin:  Negative for color change, rash, skin lesions and wound.   Neurological:  Negative for dizziness, syncope, speech difficulty, weakness, light-headedness and numbness.   Hematological:  Negative for adenopathy. Does not bruise/bleed easily.   Psychiatric/Behavioral:  The patient is not nervous/anxious.      I have reviewed and confirmed the accuracy of the ROS as documented by the MA/LPN/RN Melissa Beck MD    Objective   /80   Pulse 75   Temp 97.5 °F (36.4 °C) (Temporal)   Ht 162.6 cm (64\")   Wt 99.3 kg (219 lb)   SpO2 97%   BMI 37.59 kg/m²     Physical Exam  Constitutional:       Appearance: She is well-developed.   HENT:      Head: Normocephalic and atraumatic.   Cardiovascular:      Rate and Rhythm: Regular rhythm.   Pulmonary:      Effort: Pulmonary effort is normal.   Genitourinary:     Comments: External hemorrhoidal tags noted.  No thrombosed external hemorrhoids, or evidence of perianal/perirectal abscesses.  With straining, there are visible internal hemorrhoids.  No masses on YVONNE.  No gross blood.  Skin:     General: Skin is warm and dry.   Neurological:      Mental Status: She is alert and oriented to person, place, and time.   Psychiatric:         " Behavior: Behavior normal.           Assessment & Plan   Diagnoses and all orders for this visit:    1. Internal hemorrhoid, bleeding (Primary)  -     Case Request; Standing  -     sodium chloride 0.9 % flush 10 mL  -     sodium chloride 0.9 % flush 10 mL  -     sodium chloride 0.9 % infusion 40 mL  -     lactated ringers infusion  -     heparin (porcine) 5000 UNIT/ML injection 5,000 Units  -     Case Request    Other orders  -     Follow Anesthesia Guidelines / Protocol; Future  -     Follow Anesthesia Guidelines / Protocol; Standing  -     verify / Perform Chlorhexidine Skin Prep; Standing  -     Verify / Perform Chlorhexidine Skin Prep if Indicated (If Not Already Completed); Standing  -     Obtain Informed Consent; Future  -     Provide NPO Instructions to Patient; Future  -     Chlorhexidine Skin Prep; Future  -     Insert Peripheral IV; Standing  -     Saline Lock & Maintain IV Access; Standing  -     Place Sequential Compression Device; Standing  -     Maintain Sequential Compression Device; Standing    I offered the patient repeat hemorrhoid banding.  I suspect that her symptoms are related to internal hemorrhoids based on her exam in the office today.  I do not think she requires a repeat colonoscopy given that she has had a recent exam.  We discussed the procedure in detail along with its risk, benefits, and alternatives.  We discussed the options for having this performed in the office versus in the operating room with the benefit of sedation.  She prefers the latter.  She we will work with my office to make arrangements for scheduling, and preadmission testing.  No bowel preparation is needed.  She understands she will need to be n.p.o. after midnight the evening prior to the scheduled procedure.

## 2024-03-29 NOTE — PROGRESS NOTES
Subjective   Raquel Mariee is a 68 y.o. female.   Chief Complaint   Patient presents with    Hemorrhoids        History of Present Illness   Ms. Mariee is a 68-year-old female patient referred for evaluation of hemorrhoidal disease.  She reports a long history of hemorrhoids with previous hemorrhoid bandings in the past.  She more recently has had increased coughing secondary to her underlying asthma and feels that this has exacerbated her symptoms.  She has tried numerous over-the-counter preparations without improvement.  She does have some occasional bright red blood per rectum with wiping.      The patient's most recent colonoscopy was performed on 1/20/2022 by Dr. Pancho Amador at [facility].  This was noted to be a high risk colon cancer surveillance exam with a personal history of colon polyps, and a family history of colon polyps.  Patient was noted to have multiple small and large mouth diverticula in the entire colon without evidence of bleeding.  A 6 mm polyp was removed with a cold snare from the transverse colon.  Pathology demonstrated this polyp to be a tubular adenoma.  Nonbleeding internal hemorrhoids were seen on retroflexed exam and noted to be of moderate size.      The following portions of the patient's history were reviewed and updated as appropriate: allergies, current medications, past family history, past medical history, past social history, past surgical history, and problem list.    Patient Active Problem List   Diagnosis    Hypogammaglobulinemia    Gastric bypass status for obesity    CKD (chronic kidney disease), stage III    KALYN (obstructive sleep apnea)    Major depressive disorder in partial remission    Hypothyroidism    Fibromyalgia syndrome    Abdominal aortic aneurysm (AAA) without rupture    Nonrheumatic aortic valve stenosis    Vitamin D deficiency, unspecified     Anatomical narrow angle borderline glaucoma    Abnormal finding of blood chemistry, unspecified      Chronic diastolic heart failure    Prinzmetal angina    Common variable agammaglobulinemia    Polymyositis    Port-A-Cath in place    Abnormal CT of the chest    Acute bronchospasm    Acute kidney injury    Sinusitis, chronic    Anemia    Aortic valve stenosis and insufficiency, rheumatic    Body mass index (BMI) of 40.0 to 44.9 in adult    Calcium kidney stones    Choroidal nevus of left eye    CAD (coronary artery disease)    Claw toe, acquired    Combined forms of age-related cataract of both eyes    Depressive disorder    Essential hypertension with goal blood pressure less than 140/90    Severe persistent asthma with exacerbation    Glaucoma suspect of both eyes    Inappropriate sinus tachycardia    Migraine without aura    Mitral valve regurgitation    Osteoarthrosis involving lower leg    Pain in joint involving lower leg    Posterior vitreous detachment of both eyes    Primary osteoarthritis of left wrist    Anxiety disorder    Status post total knee replacement    Sleep disturbances    Senile nuclear sclerosis    Rotator cuff syndrome of left shoulder    Pure hypercholesterolemia    Thoracic aortic aneurysm without rupture    TIA (transient ischemic attack)    Tracheomalacia    Trochlear nerve palsy, left    Diabetes mellitus without complication    Urge incontinence of urine    Acute exacerbation of chronic obstructive pulmonary disease (COPD)    Internal hemorrhoid, bleeding       Past Medical History:   Diagnosis Date    Anxiety     Aortic valve stenosis     Cardiac murmur     Cataract, bilateral     CHF (congestive heart failure)     Chronic kidney disease     Stage III    Clostridium difficile infection     in her 30s    Common variable immunodeficiency     IVIG infusions    COPD (chronic obstructive pulmonary disease)     Depression     Diabetes mellitus     type II    Eosinophilic asthma     Fibromyalgia, primary     Full dentures     GERD (gastroesophageal reflux disease)     History of transfusion      in [facility] at [facility].  No reaction noted, patient states she does have an antibody from transfusion.    Hypertension     Hypogammaglobulinemia     Hypothyroidism     Irritable bowel syndrome     Migraines     Morbid obesity     Osteoarthritis     Prinzmetal angina 1990    Pseudomonas infection 1998    lungs    PTSD (post-traumatic stress disorder)     Pulmonary edema     Renal insufficiency     Sinus tachycardia 1990    Sleep apnea     no longer uses/needs cpap    Tachycardia     TIA (transient ischemic attack) 2017    Trochanteric bursitis 01/25/2023       Past Surgical History:   Procedure Laterality Date    APPENDECTOMY      BUNIONECTOMY Bilateral     CARDIAC CATHETERIZATION  2017    no stents needed    CARPAL TUNNEL RELEASE Right     COLONOSCOPY  2021    ENDOMETRIAL ABLATION  1997    EYE SURGERY  ? About 6-8 years ago    Laser for glaucoma    FRACTURE SURGERY  1995    No hardware; Tibeal plateau    GASTRIC BYPASS      HAND SURGERY Left     No hardware    INTERSTIM PLACEMENT N/A 05/03/2023    Procedure: INTERSTIM STAGES 1 AND 2 LEAD AND GENERATOR PLACEMENT;  Surgeon: Abner Nation MD;  Location: [facility] OR;  Service: Urology;  Laterality: N/A;    POSTERIOR VAGINAL REPAIR N/A 08/02/2023    Procedure: POSTERIOR COLPORRHAPHY;  Surgeon: Kellen Galan MD;  Location: [facility] OR;  Service: Gynecology;  Laterality: N/A;    REPLACEMENT TOTAL KNEE BILATERAL      TEETH EXTRACTION      all of bottom teeth removed    THORACOTOMY      TONSILLECTOMY      TOTAL ABDOMINAL HYSTERECTOMY WITH SALPINGO OOPHORECTOMY      TRACHEAL SURGERY      Repaired via thoracotomy    TUBAL ABDOMINAL LIGATION  1983    VENOUS ACCESS DEVICE (PORT) INSERTION      port a cath in the left chest wall       Medications:   No current facility-administered medications on file prior to visit.     Current Outpatient Medications on File Prior to Visit   Medication Sig    acetaminophen-codeine (TYLENOL with CODEINE #3) 300-30 MG  per tablet Take 1 tablet by mouth Every 4 (Four) Hours As Needed for Moderate Pain.    albuterol sulfate  (90 Base) MCG/ACT inhaler Inhale 2 puffs by mouth every 4 to 6 hours as needed for cough, wheeze, shortness of breath. Use with vortex spacer    allopurinol (ZYLOPRIM) 100 MG tablet Take 1 tablet by mouth Daily.    betamethasone valerate (VALISONE) 0.1 % cream Apply topically to the appropriate area as directed Daily. (Patient taking differently: Apply  topically to the appropriate area as directed Daily As Needed.)    buPROPion SR (WELLBUTRIN SR) 150 MG 12 hr tablet Take 1 tablet by mouth 2 (Two) Times a Day.    calcium carbonate (Antacid Maximum) 1000 MG chewable tablet Chew 500 mg 3 (Three) Times a Day.    cholecalciferol (VITAMIN D3) 25 MCG (1000 UT) tablet Take 1 tablet by mouth Daily.    clarithromycin (BIAXIN) 500 MG tablet Take 1 tablet by mouth Every 12 (Twelve) Hours. (Patient not taking: Reported on 4/8/2024)    cloNIDine (Catapres) 0.1 MG tablet Take 1 tablet by mouth 2 (Two) Times a Day. (Patient taking differently: Take 0.5 tablets by mouth every night at bedtime.)    Dextromethorphan-guaiFENesin (MUCINEX DM PO) Take 2 tablets by mouth 2 (Two) Times a Day.    dicyclomine (BENTYL) 10 MG capsule Take 1 capsule by mouth 3 (Three) Times a Day.    diphenhydrAMINE (Benadryl Allergy) 25 MG tablet Take 1-2 tablets by mouth Every 4-6 Hours As Needed.    Erenumab-aooe (Aimovig) 140 MG/ML auto-injector Inject 1 mL under the skin into the appropriate area as directed Every 30 (Thirty) Days.    estradiol (ESTRACE) 0.1 MG/GM vaginal cream Insert 0.5 grams vaginally twice weekly    ferrous sulfate 325 (65 FE) MG EC tablet Take 2 tablets by mouth Daily With Breakfast.    fexofenadine (ALLEGRA) 180 MG tablet Take 1 tablet by mouth Daily.    fluticasone (FLONASE) 50 MCG/ACT nasal spray Instill 1 spray into each nostril as directed by provider Daily.    furosemide (LASIX) 40 MG tablet Take 1 tablet by mouth  Daily, twice a month (Patient taking differently: Take 1 tablet by mouth Daily. With infusions)    glucosamine-chondroitin 500-400 MG capsule capsule Take 1 capsule by mouth 2 (Two) Times a Day With Meals.    Hypertonic Nasal Wash (Sinus Rinse Kit) pack use once or twice daily as needed    immune globulin, human, (Gammagard) 20 GM/200ML solution infusion Infuse 40 g into a venous catheter Every 28 (Twenty-Eight) Days.    ipratropium (ATROVENT) 0.06 % nasal spray Instill 1-2 sprays each nostril 2-3 times daily as needed    ipratropium-albuterol (DUO-NEB) 0.5-2.5 mg/3 ml nebulizer Inhale the contents of 1 vial through a nebulizer every 4-6 hours as needed for cough,wheezing and shortness of breath.    isosorbide mononitrate (IMDUR) 30 MG 24 hr tablet Take 1 tablet by mouth Every Morning.    levothyroxine (SYNTHROID, LEVOTHROID) 50 MCG tablet Take 1 tablet by mouth Daily.    Magnesium 250 MG tablet Take 2 tablets by mouth 2 (Two) Times a Day.    Mepolizumab (Nucala) 100 MG/ML solution auto-injector Inject 1 mL under the skin into the appropriate area as directed Every 28 (Twenty-Eight) Days. Obtaining medication from [facility] P[facility]    montelukast (SINGULAIR) 10 MG tablet Take 1 tablet by mouth every night at bedtime.    multivitamin with minerals tablet tablet Take 1 tablet by mouth Daily.    omeprazole (priLOSEC) 40 MG capsule Take 1 capsule by mouth 2 (Two) Times a Day.    ondansetron (ZOFRAN) 4 MG tablet Take 1 tablet by mouth Every 8 (Eight) Hours As Needed for Nausea or Vomiting.    POTASSIUM CHLORIDE PO Take 20 mEq by mouth Take As Directed. Takes two times a month with Lasix    potassium citrate (UROCIT-K) 10 MEQ (1080 MG) CR tablet Take 1 tablet by mouth Daily.    tiZANidine (ZANAFLEX) 4 MG tablet Take 1 tablet by mouth Every Night. (Patient taking differently: Take 1 tablet by mouth Every Night. 1/2 tablet as needed in addition to nightly with Ubrelvy)    ubrogepant (Ubrelvy) 100 MG tablet Take 1 tablet  by mouth 1 (One) Time As Needed (migraine).    valsartan (DIOVAN) 160 MG tablet Take 1 tablet by mouth Daily.    vitamin B-12 (CYANOCOBALAMIN) 1000 MCG tablet Take 1 tablet by mouth Daily.    [DISCONTINUED] fluticasone-salmeterol (Advair HFA) 230-21 MCG/ACT inhaler Inhale 2 puffs by mouth 2 (Two) Times a Day.    [DISCONTINUED] tiotropium bromide monohydrate (Spiriva Respimat) 2.5 MCG/ACT aerosol solution inhaler Inhale 2 puffs by mouth daily         Allergies:   Allergies   Allergen Reactions    Cefaclor Hives and Swelling     Other reaction(s): SWELLING  Shed 4 layers of skin and extremely SOB  Cesario Bishop's syndrome        Cephalexin Other (See Comments)     Cesario-Bishop's syndrome      Cephalosporins Hives, Swelling and Angioedema     Rechallenge with cefipime caused rash on 1/14/08.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    Verapamil Nausea And Vomiting     Dizzy    Zolpidem Other (See Comments) and Unknown (See Comments)     Automatic behaviour in sleep.  confusion    Amoxicillin Rash    Edecrin [Ethacrynic Acid] Rash and Other (See Comments)     Weight gain    Hydrochlorothiazide Hives    Sulfa Antibiotics Hives         Family History   Problem Relation Age of Onset    Diabetes Mother     Stroke Mother     Heart disease Mother     Hypertension Mother     Endometriosis Mother     Depression Mother     Diabetes Father     Hypertension Father     Stroke Father     Heart attack Father     Asthma Father      COPD Father     Dementia Father     Heart disease Father     COPD Sister     Asthma Sister     Cancer Sister         Nasal cell skin    Brain cancer Sister     Diabetes Sister     Stroke Sister     Heart attack Sister     Endometriosis Sister     Depression Sister     Arthritis Sister         Rheumatoid    Hypertension Sister     Kidney disease Sister     Diabetes Sister     COPD Sister     Asthma Sister     Endometriosis Sister     Depression Sister     Cancer Sister         Glioma    Heart disease Sister     Heart attack Sister     Endometriosis Sister     Asthma Sister     Hypertension Sister     Kidney disease Sister         ESRD    COPD Brother     Asthma Brother         Turned into COPD    Diabetes Brother     Asthma Brother     COPD Brother     Diabetes Brother     Hypertension Brother     Asthma Daughter     Endometriosis Daughter     Osteoarthritis Daughter     Hypertension Daughter     Kidney failure Daughter     Irritable bowel syndrome Daughter     Anxiety disorder Daughter     Bipolar disorder Daughter     Depression Daughter     Self-Injurious Behavior  Daughter     Suicide Attempts Daughter     Mental illness Daughter         Bipolar and eating fisorder    Asthma Daughter     Endometriosis Daughter     Osteoarthritis Daughter     Asthma Son     ADD / ADHD Son     Alcohol abuse Son     Drug abuse Son     Breast cancer Maternal Cousin     Heart disease Maternal Grandfather     Heart disease Maternal Uncle     OCD Neg Hx     Paranoid behavior Neg Hx     Schizophrenia Neg Hx     Seizures Neg Hx     Ovarian cancer Neg Hx        Social History     Socioeconomic History    Marital status:    Tobacco Use    Smoking status: Never     Passive exposure: Never    Smokeless tobacco: Never   Vaping Use    Vaping status: Never Used   Substance and Sexual Activity    Alcohol use: Yes     Comment: ONCE A YEAR    Drug use: Never    Sexual activity: Defer       Review of Systems   Constitutional:  Negative for  "diaphoresis, unexpected weight gain and unexpected weight loss.   HENT:  Negative for hearing loss, trouble swallowing and voice change.    Eyes:  Negative for visual disturbance.   Respiratory:  Negative for apnea, cough, chest tightness, shortness of breath and wheezing.    Cardiovascular:  Negative for chest pain, palpitations and leg swelling.   Gastrointestinal:  Positive for rectal pain. Negative for abdominal distention, abdominal pain, anal bleeding, blood in stool, constipation, diarrhea, nausea, vomiting, GERD and indigestion.   Endocrine: Negative for cold intolerance and heat intolerance.   Genitourinary:  Negative for difficulty urinating, dysuria and flank pain.   Musculoskeletal:  Negative for back pain and gait problem.   Skin:  Negative for color change, rash, skin lesions and wound.   Neurological:  Negative for dizziness, syncope, speech difficulty, weakness, light-headedness and numbness.   Hematological:  Negative for adenopathy. Does not bruise/bleed easily.   Psychiatric/Behavioral:  The patient is not nervous/anxious.      I have reviewed and confirmed the accuracy of the ROS as documented by the MA/LPN/RN Melissa Beck MD    Objective   /80   Pulse 75   Temp 97.5 °F (36.4 °C) (Temporal)   Ht 162.6 cm (64\")   Wt 99.3 kg (219 lb)   SpO2 97%   BMI 37.59 kg/m²     Physical Exam  Constitutional:       Appearance: She is well-developed.   HENT:      Head: Normocephalic and atraumatic.   Cardiovascular:      Rate and Rhythm: Regular rhythm.   Pulmonary:      Effort: Pulmonary effort is normal.   Genitourinary:     Comments: External hemorrhoidal tags noted.  No thrombosed external hemorrhoids, or evidence of perianal/perirectal abscesses.  With straining, there are visible internal hemorrhoids.  No masses on YVONNE.  No gross blood.  Skin:     General: Skin is warm and dry.   Neurological:      Mental Status: She is alert and oriented to person, place, and time.   Psychiatric:         " Behavior: Behavior normal.           Assessment & Plan   Diagnoses and all orders for this visit:    1. Internal hemorrhoid, bleeding (Primary)  -     Case Request; Standing  -     sodium chloride 0.9 % flush 10 mL  -     sodium chloride 0.9 % flush 10 mL  -     sodium chloride 0.9 % infusion 40 mL  -     lactated ringers infusion  -     heparin (porcine) 5000 UNIT/ML injection 5,000 Units  -     Case Request    Other orders  -     Follow Anesthesia Guidelines / Protocol; Future  -     Follow Anesthesia Guidelines / Protocol; Standing  -     verify / Perform Chlorhexidine Skin Prep; Standing  -     Verify / Perform Chlorhexidine Skin Prep if Indicated (If Not Already Completed); Standing  -     Obtain Informed Consent; Future  -     Provide NPO Instructions to Patient; Future  -     Chlorhexidine Skin Prep; Future  -     Insert Peripheral IV; Standing  -     Saline Lock & Maintain IV Access; Standing  -     Place Sequential Compression Device; Standing  -     Maintain Sequential Compression Device; Standing    I offered the patient repeat hemorrhoid banding.  I suspect that her symptoms are related to internal hemorrhoids based on her exam in the office today.  I do not think she requires a repeat colonoscopy given that she has had a recent exam.  We discussed the procedure in detail along with its risk, benefits, and alternatives.  We discussed the options for having this performed in the office versus in the operating room with the benefit of sedation.  She prefers the latter.  She we will work with my office to make arrangements for scheduling, and preadmission testing.  No bowel preparation is needed.  She understands she will need to be n.p.o. after midnight the evening prior to the scheduled procedure.

## 2024-04-03 ENCOUNTER — OFFICE VISIT (OUTPATIENT)
Dept: SURGERY | Facility: CLINIC | Age: 69
End: 2024-04-03
Payer: MEDICARE

## 2024-04-03 VITALS
HEIGHT: 64 IN | TEMPERATURE: 97.5 F | WEIGHT: 219 LBS | HEART RATE: 75 BPM | BODY MASS INDEX: 37.39 KG/M2 | OXYGEN SATURATION: 97 % | SYSTOLIC BLOOD PRESSURE: 120 MMHG | DIASTOLIC BLOOD PRESSURE: 80 MMHG

## 2024-04-03 DIAGNOSIS — K64.8 INTERNAL HEMORRHOID, BLEEDING: Primary | ICD-10-CM

## 2024-04-03 PROCEDURE — 1160F RVW MEDS BY RX/DR IN RCRD: CPT | Performed by: SURGERY

## 2024-04-03 PROCEDURE — 1159F MED LIST DOCD IN RCRD: CPT | Performed by: SURGERY

## 2024-04-03 PROCEDURE — 3079F DIAST BP 80-89 MM HG: CPT | Performed by: SURGERY

## 2024-04-03 PROCEDURE — 99203 OFFICE O/P NEW LOW 30 MIN: CPT | Performed by: SURGERY

## 2024-04-03 PROCEDURE — 3074F SYST BP LT 130 MM HG: CPT | Performed by: SURGERY

## 2024-04-04 DIAGNOSIS — J45.50 SEVERE PERSISTENT ASTHMA WITHOUT COMPLICATION: ICD-10-CM

## 2024-04-04 PROBLEM — K64.8 INTERNAL HEMORRHOID, BLEEDING: Status: ACTIVE | Noted: 2024-04-03

## 2024-04-04 RX ORDER — SODIUM CHLORIDE 0.9 % (FLUSH) 0.9 %
10 SYRINGE (ML) INJECTION AS NEEDED
Status: CANCELLED | OUTPATIENT
Start: 2024-04-04

## 2024-04-04 RX ORDER — SODIUM CHLORIDE 0.9 % (FLUSH) 0.9 %
10 SYRINGE (ML) INJECTION EVERY 12 HOURS SCHEDULED
Status: CANCELLED | OUTPATIENT
Start: 2024-04-04

## 2024-04-04 RX ORDER — BUDESONIDE, GLYCOPYRROLATE, AND FORMOTEROL FUMARATE 160; 9; 4.8 UG/1; UG/1; UG/1
AEROSOL, METERED RESPIRATORY (INHALATION)
Qty: 10.7 G | Refills: 11 | Status: SHIPPED | OUTPATIENT
Start: 2024-04-04

## 2024-04-04 RX ORDER — SODIUM CHLORIDE 9 MG/ML
40 INJECTION, SOLUTION INTRAVENOUS AS NEEDED
Status: CANCELLED | OUTPATIENT
Start: 2024-04-04

## 2024-04-04 RX ORDER — SODIUM CHLORIDE, SODIUM LACTATE, POTASSIUM CHLORIDE, CALCIUM CHLORIDE 600; 310; 30; 20 MG/100ML; MG/100ML; MG/100ML; MG/100ML
50 INJECTION, SOLUTION INTRAVENOUS CONTINUOUS
Status: CANCELLED | OUTPATIENT
Start: 2024-04-04

## 2024-04-04 RX ORDER — HEPARIN SODIUM 5000 [USP'U]/ML
5000 INJECTION, SOLUTION INTRAVENOUS; SUBCUTANEOUS ONCE
Status: CANCELLED | OUTPATIENT
Start: 2024-04-04 | End: 2024-04-04

## 2024-04-08 NOTE — PRE-PROCEDURE INSTRUCTIONS
PAT phone history completed with patient for upcoming procedure on 4/9/24 with Dr. Beck.    PAT PASS reviewed with patient and they verbalize understanding of the following:     Do not eat or drink anything after midnight the night before procedure unless otherwise instructed by physician/surgeon's office, this includes no gum, candy, mints, tobacco products or e-cigarettes.  Do not shave the area to be operated on at least 48 hours prior to procedure.  Do not wear makeup, lotion, hair products, or nail polish.  Do not wear any jewelry and remove all piercings.  Do not wear any adhesive if you wear dentures.  Do not wear contacts; bring in glasses if needed.  Only take medications on the morning of procedure as instructed by PAT nurse per anesthesia guidelines or as instructed by physician's office.  If you are on any blood thinners reach out to the physician/surgeon's office for instructions on when/if they will need to be stopped prior to procedure.  Bring in picture ID and insurance card, advanced directive copies if applicable, CPAP/BIPAP/Inhalers if indicated morning of procedure, leave any other valuables at home.  Ensure you have arranged for someone to drive you home the day of your procedure and someone to care for you at home afterwards. It is recommended that you do not drive, drink alcohol, or make any major legal decisions for at least 24 hours after your procedure is complete.    Instructions given on hospital entrance and registration location.

## 2024-04-09 ENCOUNTER — HOSPITAL ENCOUNTER (OUTPATIENT)
Facility: HOSPITAL | Age: 69
Setting detail: HOSPITAL OUTPATIENT SURGERY
Discharge: HOME | End: 2024-04-09
Attending: SURGERY | Admitting: SURGERY
Payer: MEDICARE

## 2024-04-09 ENCOUNTER — ANESTHESIA EVENT (OUTPATIENT)
Dept: PERIOP | Facility: HOSPITAL | Age: 69
End: 2024-04-09
Payer: MEDICARE

## 2024-04-09 ENCOUNTER — ANESTHESIA (OUTPATIENT)
Dept: PERIOP | Facility: HOSPITAL | Age: 69
End: 2024-04-09
Payer: MEDICARE

## 2024-04-09 VITALS
HEIGHT: 64 IN | SYSTOLIC BLOOD PRESSURE: 95 MMHG | RESPIRATION RATE: 20 BRPM | BODY MASS INDEX: 35.51 KG/M2 | TEMPERATURE: 97.8 F | HEART RATE: 70 BPM | DIASTOLIC BLOOD PRESSURE: 52 MMHG | WEIGHT: 208 LBS | OXYGEN SATURATION: 97 %

## 2024-04-09 DIAGNOSIS — K64.8 INTERNAL HEMORRHOID, BLEEDING: ICD-10-CM

## 2024-04-09 PROCEDURE — 25010000002 PROPOFOL 10 MG/ML EMULSION: Performed by: NURSE ANESTHETIST, CERTIFIED REGISTERED

## 2024-04-09 PROCEDURE — 25810000003 LACTATED RINGERS PER 1000 ML: Performed by: SURGERY

## 2024-04-09 PROCEDURE — 25010000002 HEPARIN (PORCINE) PER 1000 UNITS: Performed by: SURGERY

## 2024-04-09 PROCEDURE — 25010000002 FENTANYL CITRATE (PF) 50 MCG/ML SOLUTION PREFILLED SYRINGE: Performed by: NURSE ANESTHETIST, CERTIFIED REGISTERED

## 2024-04-09 RX ORDER — ONDANSETRON 2 MG/ML
4 INJECTION INTRAMUSCULAR; INTRAVENOUS ONCE AS NEEDED
Status: DISCONTINUED | OUTPATIENT
Start: 2024-04-09 | End: 2024-04-09 | Stop reason: HOSPADM

## 2024-04-09 RX ORDER — SODIUM CHLORIDE 9 MG/ML
40 INJECTION, SOLUTION INTRAVENOUS AS NEEDED
Status: DISCONTINUED | OUTPATIENT
Start: 2024-04-09 | End: 2024-04-09 | Stop reason: HOSPADM

## 2024-04-09 RX ORDER — PROPOFOL 10 MG/ML
VIAL (ML) INTRAVENOUS AS NEEDED
Status: DISCONTINUED | OUTPATIENT
Start: 2024-04-09 | End: 2024-04-09 | Stop reason: SURG

## 2024-04-09 RX ORDER — SODIUM CHLORIDE 0.9 % (FLUSH) 0.9 %
10 SYRINGE (ML) INJECTION AS NEEDED
Status: DISCONTINUED | OUTPATIENT
Start: 2024-04-09 | End: 2024-04-09 | Stop reason: HOSPADM

## 2024-04-09 RX ORDER — HEPARIN SODIUM 5000 [USP'U]/ML
5000 INJECTION, SOLUTION INTRAVENOUS; SUBCUTANEOUS ONCE
Status: COMPLETED | OUTPATIENT
Start: 2024-04-09 | End: 2024-04-09

## 2024-04-09 RX ORDER — KETAMINE HCL IN NACL, ISO-OSM 100MG/10ML
SYRINGE (ML) INJECTION AS NEEDED
Status: DISCONTINUED | OUTPATIENT
Start: 2024-04-09 | End: 2024-04-09 | Stop reason: SURG

## 2024-04-09 RX ORDER — FENTANYL CITRATE 50 UG/ML
INJECTION, SOLUTION INTRAMUSCULAR; INTRAVENOUS AS NEEDED
Status: DISCONTINUED | OUTPATIENT
Start: 2024-04-09 | End: 2024-04-09 | Stop reason: SURG

## 2024-04-09 RX ORDER — SODIUM CHLORIDE 0.9 % (FLUSH) 0.9 %
10 SYRINGE (ML) INJECTION EVERY 12 HOURS SCHEDULED
Status: DISCONTINUED | OUTPATIENT
Start: 2024-04-09 | End: 2024-04-09 | Stop reason: HOSPADM

## 2024-04-09 RX ORDER — SODIUM CHLORIDE, SODIUM LACTATE, POTASSIUM CHLORIDE, CALCIUM CHLORIDE 600; 310; 30; 20 MG/100ML; MG/100ML; MG/100ML; MG/100ML
50 INJECTION, SOLUTION INTRAVENOUS CONTINUOUS
Status: DISCONTINUED | OUTPATIENT
Start: 2024-04-09 | End: 2024-04-09 | Stop reason: HOSPADM

## 2024-04-09 RX ADMIN — SODIUM CHLORIDE, POTASSIUM CHLORIDE, SODIUM LACTATE AND CALCIUM CHLORIDE 50 ML/HR: 600; 310; 30; 20 INJECTION, SOLUTION INTRAVENOUS at 08:13

## 2024-04-09 RX ADMIN — FENTANYL CITRATE 25 MCG: 50 INJECTION, SOLUTION INTRAMUSCULAR; INTRAVENOUS at 09:41

## 2024-04-09 RX ADMIN — Medication 25 MG: at 09:41

## 2024-04-09 RX ADMIN — PROPOFOL 140 MCG/KG/MIN: 10 INJECTION, EMULSION INTRAVENOUS at 09:41

## 2024-04-09 RX ADMIN — HEPARIN SODIUM 5000 UNITS: 5000 INJECTION, SOLUTION INTRAVENOUS; SUBCUTANEOUS at 09:34

## 2024-04-09 RX ADMIN — PROPOFOL 50 MG: 10 INJECTION, EMULSION INTRAVENOUS at 09:41

## 2024-04-09 NOTE — ANESTHESIA PREPROCEDURE EVALUATION
Anesthesia Evaluation     Patient summary reviewed and Nursing notes reviewed   no history of anesthetic complications:   NPO Solid Status: > 8 hours  NPO Liquid Status: > 8 hours           Airway   Mallampati: II  TM distance: >3 FB  Neck ROM: full  No difficulty expected  Dental      Pulmonary - normal exam   (+) COPD, asthma,sleep apnea  Cardiovascular - normal exam    (+) hypertension, valvular problems/murmurs, CAD, angina, hyperlipidemia      Neuro/Psych  (+) TIA, headaches, numbness, psychiatric history  GI/Hepatic/Renal/Endo    (+) morbid obesity, renal disease-, diabetes mellitus, thyroid problem     Musculoskeletal     (+) myalgias  Abdominal    Substance History      OB/GYN          Other   arthritis,                 Anesthesia Plan    ASA 3     general     (Risks and benefits discussed including risk of aspiration, recall and dental damage. All patient questions answered. Will continue with POC. )  intravenous induction     Anesthetic plan, risks, benefits, and alternatives have been provided, discussed and informed consent has been obtained with: patient.  Pre-procedure education provided    CODE STATUS:

## 2024-04-09 NOTE — ANESTHESIA POSTPROCEDURE EVALUATION
Patient: Raquel Mariee    Procedure Summary       Date: 04/09/24 Room / Location: [facility] OR 2 /  [facility]    Anesthesia Start: 0939 Anesthesia Stop: 1007    Procedures:       RECTAL EXAM UNDER ANESTHESIA (Anus)      HEMORRHOID BANDING X2 (Rectum) Diagnosis:       Internal hemorrhoid, bleeding      (Internal hemorrhoid, bleeding [K64.8])    Surgeons: Melissa Beck MD Provider: Viraj Petty CRNA    Anesthesia Type: general ASA Status: 3            Anesthesia Type: general    Vitals  HR 67  Sat 97  /58  Temp 97.6  Resp 16        Post Anesthesia Care and Evaluation    Patient location during evaluation: bedside  Patient participation: complete - patient participated  Level of consciousness: awake and alert and sleepy but conscious  Pain score: 0  Pain management: adequate    Airway patency: patent  Anesthetic complications: No anesthetic complications  PONV Status: none  Cardiovascular status: acceptable  Respiratory status: acceptable  Hydration status: acceptable

## 2024-04-17 NOTE — PROGRESS NOTES
"Subjective   Raquel Mariee is a 68 y.o. female.   Chief Complaint   Patient presents with    Hemorrhoids    Follow-up      History of Present Illness     Ms. Mariee comes the office today for routine follow-up after undergoing a rectal exam under anesthesia with hemorrhoid banding x 2.  She reports that she has some mild ongoing discomfort with defecation, but otherwise is doing well.  She reports no postprocedural difficulties.      The following portions of the patient's history were reviewed and updated as appropriate: allergies, current medications, past family history, past medical history, past social history, past surgical history, and problem list.        Objective   /76   Pulse 69   Temp 97.5 °F (36.4 °C) (Temporal)   Ht 162.6 cm (64\")   Wt 96 kg (211 lb 9.6 oz)   SpO2 98%   BMI 36.32 kg/m²     Physical Exam  Constitutional:       Appearance: She is well-developed.   HENT:      Head: Normocephalic and atraumatic.   Cardiovascular:      Rate and Rhythm: Regular rhythm.   Pulmonary:      Effort: Pulmonary effort is normal.   Skin:     General: Skin is warm and dry.   Neurological:      Mental Status: She is alert and oriented to person, place, and time.   Psychiatric:         Behavior: Behavior normal.           Assessment & Plan   Diagnoses and all orders for this visit:    1. Internal hemorrhoid, bleeding (Primary)      Ms. Mariee is recovering well following rectal examination under anesthesia, anoscopy, and hemorrhoid banding x 2.  Other than some mild residual discomfort with defecation, her bleeding seems to have resolved.  She has had no apparent complications, and is well versed in ongoing recovery from the procedure having undergone banding in the past.  She is pleased with her results and knows to call if any problems arise.           "

## 2024-04-18 ENCOUNTER — OFFICE VISIT (OUTPATIENT)
Dept: SURGERY | Facility: CLINIC | Age: 69
End: 2024-04-18
Payer: MEDICARE

## 2024-04-18 VITALS
DIASTOLIC BLOOD PRESSURE: 76 MMHG | HEIGHT: 64 IN | BODY MASS INDEX: 36.12 KG/M2 | HEART RATE: 69 BPM | SYSTOLIC BLOOD PRESSURE: 124 MMHG | TEMPERATURE: 97.5 F | OXYGEN SATURATION: 98 % | WEIGHT: 211.6 LBS

## 2024-04-18 DIAGNOSIS — K64.8 INTERNAL HEMORRHOID, BLEEDING: Primary | ICD-10-CM

## 2024-04-18 PROCEDURE — 3078F DIAST BP <80 MM HG: CPT | Performed by: SURGERY

## 2024-04-18 PROCEDURE — 1160F RVW MEDS BY RX/DR IN RCRD: CPT | Performed by: SURGERY

## 2024-04-18 PROCEDURE — 3074F SYST BP LT 130 MM HG: CPT | Performed by: SURGERY

## 2024-04-18 PROCEDURE — 1159F MED LIST DOCD IN RCRD: CPT | Performed by: SURGERY

## 2024-04-18 PROCEDURE — 99024 POSTOP FOLLOW-UP VISIT: CPT | Performed by: SURGERY

## 2024-04-23 DIAGNOSIS — Z79.4 TYPE 2 DIABETES MELLITUS WITHOUT COMPLICATION, WITH LONG-TERM CURRENT USE OF INSULIN: ICD-10-CM

## 2024-04-23 DIAGNOSIS — E11.9 TYPE 2 DIABETES MELLITUS WITHOUT COMPLICATION, WITH LONG-TERM CURRENT USE OF INSULIN: ICD-10-CM

## 2024-04-23 RX ORDER — ALLOPURINOL 100 MG/1
100 TABLET ORAL DAILY
Qty: 90 TABLET | Refills: 3 | Status: SHIPPED | OUTPATIENT
Start: 2024-04-23

## 2024-04-23 RX ORDER — TIZANIDINE 4 MG/1
4 TABLET ORAL NIGHTLY
Qty: 90 TABLET | Refills: 3 | Status: SHIPPED | OUTPATIENT
Start: 2024-04-23

## 2024-04-26 ENCOUNTER — HOSPITAL ENCOUNTER (OUTPATIENT)
Dept: INFUSION THERAPY | Facility: HOSPITAL | Age: 69
Discharge: HOME | End: 2024-04-26
Payer: MEDICARE

## 2024-04-26 VITALS
DIASTOLIC BLOOD PRESSURE: 84 MMHG | OXYGEN SATURATION: 98 % | SYSTOLIC BLOOD PRESSURE: 158 MMHG | TEMPERATURE: 97.7 F | RESPIRATION RATE: 20 BRPM | HEART RATE: 70 BPM

## 2024-04-26 DIAGNOSIS — D80.1 COMMON VARIABLE AGAMMAGLOBULINEMIA: Primary | ICD-10-CM

## 2024-04-26 DIAGNOSIS — Z95.828 PORT-A-CATH IN PLACE: ICD-10-CM

## 2024-04-26 LAB
ALBUMIN SERPL-MCNC: 4.3 G/DL (ref 3.5–5.2)
ALBUMIN/GLOB SERPL: 1.5 G/DL
ALP SERPL-CCNC: 66 U/L (ref 39–117)
ALT SERPL W P-5'-P-CCNC: 17 U/L (ref 1–33)
ANION GAP SERPL CALCULATED.3IONS-SCNC: 9 MMOL/L (ref 5–15)
AST SERPL-CCNC: 26 U/L (ref 1–32)
BILIRUB SERPL-MCNC: 0.3 MG/DL (ref 0–1.2)
BUN SERPL-MCNC: 11 MG/DL (ref 8–23)
BUN/CREAT SERPL: 9.9 (ref 7–25)
CALCIUM SPEC-SCNC: 9.1 MG/DL (ref 8.6–10.5)
CHLORIDE SERPL-SCNC: 98 MMOL/L (ref 98–107)
CO2 SERPL-SCNC: 25 MMOL/L (ref 22–29)
CREAT SERPL-MCNC: 1.11 MG/DL (ref 0.57–1)
DEPRECATED RDW RBC AUTO: 43 FL (ref 37–54)
EGFRCR SERPLBLD CKD-EPI 2021: 54.3 ML/MIN/1.73
ERYTHROCYTE [DISTWIDTH] IN BLOOD BY AUTOMATED COUNT: 12.5 % (ref 12.3–15.4)
GLOBULIN UR ELPH-MCNC: 2.8 GM/DL
GLUCOSE SERPL-MCNC: 113 MG/DL (ref 65–99)
HCT VFR BLD AUTO: 36.4 % (ref 34–46.6)
HGB BLD-MCNC: 12.9 G/DL (ref 12–15.9)
IGG1 SER-MCNC: 1106 MG/DL (ref 700–1600)
MCH RBC QN AUTO: 32.7 PG (ref 26.6–33)
MCHC RBC AUTO-ENTMCNC: 35.4 G/DL (ref 31.5–35.7)
MCV RBC AUTO: 92.4 FL (ref 79–97)
PLATELET # BLD AUTO: 249 10*3/MM3 (ref 140–450)
PMV BLD AUTO: 8.7 FL (ref 6–12)
POTASSIUM SERPL-SCNC: 4.3 MMOL/L (ref 3.5–5.2)
PROT SERPL-MCNC: 7.1 G/DL (ref 6–8.5)
RBC # BLD AUTO: 3.94 10*6/MM3 (ref 3.77–5.28)
SODIUM SERPL-SCNC: 132 MMOL/L (ref 136–145)
WBC NRBC COR # BLD AUTO: 4.54 10*3/MM3 (ref 3.4–10.8)

## 2024-04-26 PROCEDURE — 96365 THER/PROPH/DIAG IV INF INIT: CPT

## 2024-04-26 PROCEDURE — 25810000003 SODIUM CHLORIDE 0.9 % SOLUTION: Performed by: ALLERGY & IMMUNOLOGY

## 2024-04-26 PROCEDURE — 85027 COMPLETE CBC AUTOMATED: CPT | Performed by: ALLERGY & IMMUNOLOGY

## 2024-04-26 PROCEDURE — 82784 ASSAY IGA/IGD/IGG/IGM EACH: CPT | Performed by: ALLERGY & IMMUNOLOGY

## 2024-04-26 PROCEDURE — 80053 COMPREHEN METABOLIC PANEL: CPT | Performed by: ALLERGY & IMMUNOLOGY

## 2024-04-26 PROCEDURE — 25010000002 IMMUNE GLOBULIN (HUMAN) 20 GM/200ML SOLUTION: Performed by: ALLERGY & IMMUNOLOGY

## 2024-04-26 PROCEDURE — 96366 THER/PROPH/DIAG IV INF ADDON: CPT

## 2024-04-26 PROCEDURE — 25010000002 HEPARIN LOCK FLUSH PER 10 UNITS: Performed by: ALLERGY & IMMUNOLOGY

## 2024-04-26 RX ORDER — HEPARIN SODIUM (PORCINE) LOCK FLUSH IV SOLN 100 UNIT/ML 100 UNIT/ML
500 SOLUTION INTRAVENOUS AS NEEDED
OUTPATIENT
Start: 2024-04-26

## 2024-04-26 RX ORDER — PREDNISONE 20 MG/1
40 TABLET ORAL ONCE
Start: 2024-05-24 | End: 2024-05-24

## 2024-04-26 RX ORDER — PREDNISONE 20 MG/1
40 TABLET ORAL ONCE
Status: DISCONTINUED | OUTPATIENT
Start: 2024-04-26 | End: 2024-04-28 | Stop reason: HOSPADM

## 2024-04-26 RX ORDER — METHYLPREDNISOLONE SODIUM SUCCINATE 125 MG/2ML
50 INJECTION, POWDER, LYOPHILIZED, FOR SOLUTION INTRAMUSCULAR; INTRAVENOUS ONCE AS NEEDED
Status: DISCONTINUED | OUTPATIENT
Start: 2024-04-26 | End: 2024-04-28 | Stop reason: HOSPADM

## 2024-04-26 RX ORDER — DIPHENHYDRAMINE HCL 25 MG
50 CAPSULE ORAL ONCE
Start: 2024-05-24 | End: 2024-05-24

## 2024-04-26 RX ORDER — ACETAMINOPHEN 325 MG/1
325 TABLET ORAL ONCE
OUTPATIENT
Start: 2024-05-24

## 2024-04-26 RX ORDER — DIPHENHYDRAMINE HCL 25 MG
50 CAPSULE ORAL ONCE
Status: DISCONTINUED | OUTPATIENT
Start: 2024-04-26 | End: 2024-04-28 | Stop reason: HOSPADM

## 2024-04-26 RX ORDER — DIPHENHYDRAMINE HCL 25 MG
50 CAPSULE ORAL ONCE AS NEEDED
Start: 2024-05-24

## 2024-04-26 RX ORDER — PREDNISONE 20 MG/1
40 TABLET ORAL ONCE AS NEEDED
Status: DISCONTINUED | OUTPATIENT
Start: 2024-04-26 | End: 2024-04-28 | Stop reason: HOSPADM

## 2024-04-26 RX ORDER — PREDNISONE 20 MG/1
40 TABLET ORAL ONCE AS NEEDED
Start: 2024-05-24

## 2024-04-26 RX ORDER — DIPHENHYDRAMINE HCL 25 MG
50 CAPSULE ORAL ONCE AS NEEDED
Status: DISCONTINUED | OUTPATIENT
Start: 2024-04-26 | End: 2024-04-28 | Stop reason: HOSPADM

## 2024-04-26 RX ORDER — ACETAMINOPHEN 325 MG/1
325 TABLET ORAL ONCE
Status: DISCONTINUED | OUTPATIENT
Start: 2024-04-26 | End: 2024-04-28 | Stop reason: HOSPADM

## 2024-04-26 RX ORDER — HEPARIN SODIUM (PORCINE) LOCK FLUSH IV SOLN 100 UNIT/ML 100 UNIT/ML
500 SOLUTION INTRAVENOUS AS NEEDED
Status: DISCONTINUED | OUTPATIENT
Start: 2024-04-26 | End: 2024-04-28 | Stop reason: HOSPADM

## 2024-04-26 RX ORDER — SODIUM CHLORIDE 0.9 % (FLUSH) 0.9 %
10 SYRINGE (ML) INJECTION AS NEEDED
Status: DISCONTINUED | OUTPATIENT
Start: 2024-04-26 | End: 2024-04-28 | Stop reason: HOSPADM

## 2024-04-26 RX ORDER — EPINEPHRINE 1 MG/ML
0.3 INJECTION, SOLUTION INTRAMUSCULAR; SUBCUTANEOUS ONCE AS NEEDED
Status: DISCONTINUED | OUTPATIENT
Start: 2024-04-26 | End: 2024-04-28 | Stop reason: HOSPADM

## 2024-04-26 RX ORDER — METHYLPREDNISOLONE SODIUM SUCCINATE 125 MG/2ML
50 INJECTION, POWDER, LYOPHILIZED, FOR SOLUTION INTRAMUSCULAR; INTRAVENOUS ONCE AS NEEDED
Start: 2024-05-24

## 2024-04-26 RX ORDER — SODIUM CHLORIDE 0.9 % (FLUSH) 0.9 %
10 SYRINGE (ML) INJECTION AS NEEDED
OUTPATIENT
Start: 2024-04-26

## 2024-04-26 RX ORDER — EPINEPHRINE 1 MG/ML
0.3 INJECTION, SOLUTION INTRAMUSCULAR; SUBCUTANEOUS ONCE AS NEEDED
Start: 2024-05-24

## 2024-04-26 RX ADMIN — IMMUNE GLOBULIN INFUSION (HUMAN) 20 G: 100 INJECTION, SOLUTION INTRAVENOUS; SUBCUTANEOUS at 09:28

## 2024-04-26 RX ADMIN — HEPARIN 500 UNITS: 100 SYRINGE at 13:26

## 2024-04-26 RX ADMIN — IMMUNE GLOBULIN INFUSION (HUMAN) 20 G: 100 INJECTION, SOLUTION INTRAVENOUS; SUBCUTANEOUS at 11:35

## 2024-04-26 RX ADMIN — SODIUM CHLORIDE 500 ML: 9 INJECTION, SOLUTION INTRAVENOUS at 08:55

## 2024-04-26 RX ADMIN — Medication 10 ML: at 13:26

## 2024-05-03 NOTE — BRIEF OP NOTE
RECTAL EXAM UNDER ANESTHESIA, HEMORRHOID BANDING     Raquel Mariee  4/9/2024    Pre-op Diagnosis:   Internal hemorrhoid, bleeding [K64.8]       Post-Op Diagnosis Codes:     * Internal hemorrhoid, bleeding [K64.8]    Procedure/CPT® Codes:        Procedure(s):  RECTAL EXAM UNDER ANESTHESIA  HEMORRHOID BANDING X2              Surgeon(s):  Melissa Beck MD    Anesthesia: Monitored Anesthesia Care    Staff:   Circulator: Royce Fuller RN; Yesenia Perry RN  Scrub Person: Laurita Owen         Estimated Blood Loss: none    Urine Voided: * No values recorded between 4/9/2024  9:36 AM and 4/9/2024 10:10 AM *    Specimens:                None          Drains: none      Complications: none          Melissa Beck MD

## 2024-05-03 NOTE — OP NOTE
Operative Report    Patient Name:  Raquel Mariee  YOB: 1955    Date of Surgery:  4/9/2024     Pre-op Diagnosis:   Internal hemorrhoid, bleeding [K64.8]       Post-Op Diagnosis Codes:     * Internal hemorrhoid, bleeding [K64.8]    Procedure/CPT® Codes:      Procedure(s):  RECTAL EXAM UNDER ANESTHESIA  HEMORRHOID BANDING X2      Staff:  Surgeon(s):  Melissa Beck MD         Anesthesia: Monitored Anesthesia Care    Estimated Blood Loss: none    Implants:    Nothing was implanted during the procedure    Specimen:          None        Complications: None    Indications for the procedure: Ms. Mariee is a 68-year-old female patient recently evaluated in my office for suspected hemorrhoidal disease.  She reports a long history of hemorrhoids with previous hemorrhoid bandings in the past.  She more recently has had increased coughing secondary to her underlying asthma reported exacerbation of her hemorrhoidal symptoms secondary to the same.  She reports no improvement with multiple over-the-counter preparations.  She has occasional bright red blood per rectum, predominantly with wiping.  It was noted that her most recent colonoscopy was performed at [facility] in January 2022 and was notable for multiple throughout the colon, removal of a 6 mm polyp, transverse colon, removed internal hemorrhoids on retroflex exam.  It was not felt that additional colonoscopic examination was needed, and the patient was offered rectal examination dysphagia, anoscopy, and possible hemorrhoid banding.  She was counseled accordingly and provided her informed consent to proceed with the same.    Description of Procedure: The patient was seen and examined in the preoperative holding area on the day of the surgical procedure.  Her history and physical examination was updated as appropriate.  She was subsequently brought to the operating room and placed supine on the operating table, where a timeout was performed  using WHO checklist.  SCDs were placed for DVT prophylaxis.Following the satisfactory induction of anesthesia, the patient's perineum was prepped and draped in the usual sterile fashion.  The perineum was inspected, and some small residual external hemorrhoidal tags were identified.  A digital rectal exam was performed with a gloved, well-lubricated finger no masses were appreciated.  Once this was done, the anoscope was inserted and a circumferential examination was performed of the anal rectal junction.  Internal hemorrhoids of a moderate size were noted.  The 2 largest columns were selected for banding.  Each of these was sequentially identified, pulled into the suction , and a band applied without incident.  The third column was not felt to be large enough to necessitate or accommodate banding.  Once the bands were applied, the endoscope was used to reevaluate the area, ensuring that the bands were above the dentate line.  Once this was confirmed, the endoscope was removed, and the procedure was concluded.  No additional abnormalities were identified.  The patient was returned to the supine position, awakened from anesthesia, and taken to the recovery room in good condition.  No immediate complications were identified and the patient tolerated the procedure without incident.                      Melissa Beck MD     Date: 5/3/2024  Time: 09:26 EDT

## 2024-05-08 DIAGNOSIS — E11.9 TYPE 2 DIABETES MELLITUS WITHOUT COMPLICATION, WITH LONG-TERM CURRENT USE OF INSULIN: ICD-10-CM

## 2024-05-08 DIAGNOSIS — Z79.4 TYPE 2 DIABETES MELLITUS WITHOUT COMPLICATION, WITH LONG-TERM CURRENT USE OF INSULIN: ICD-10-CM

## 2024-05-09 RX ORDER — OMEPRAZOLE 40 MG/1
40 CAPSULE, DELAYED RELEASE ORAL 2 TIMES DAILY
Qty: 180 CAPSULE | Refills: 3 | Status: SHIPPED | OUTPATIENT
Start: 2024-05-09 | End: 2024-05-10

## 2024-05-10 DIAGNOSIS — E11.9 TYPE 2 DIABETES MELLITUS WITHOUT COMPLICATION, WITH LONG-TERM CURRENT USE OF INSULIN: ICD-10-CM

## 2024-05-10 DIAGNOSIS — Z79.4 TYPE 2 DIABETES MELLITUS WITHOUT COMPLICATION, WITH LONG-TERM CURRENT USE OF INSULIN: ICD-10-CM

## 2024-05-10 RX ORDER — OMEPRAZOLE 40 MG/1
40 CAPSULE, DELAYED RELEASE ORAL 2 TIMES DAILY
Qty: 180 CAPSULE | Refills: 3 | Status: SHIPPED | OUTPATIENT
Start: 2024-05-10

## 2024-05-16 ENCOUNTER — HOSPITAL ENCOUNTER (OUTPATIENT)
Dept: MAMMOGRAPHY | Facility: HOSPITAL | Age: 69
Discharge: HOME | End: 2024-05-16
Admitting: INTERNAL MEDICINE
Payer: MEDICARE

## 2024-05-16 DIAGNOSIS — Z12.31 SCREENING MAMMOGRAM FOR BREAST CANCER: ICD-10-CM

## 2024-05-16 PROCEDURE — 77063 BREAST TOMOSYNTHESIS BI: CPT

## 2024-05-16 PROCEDURE — 77067 SCR MAMMO BI INCL CAD: CPT

## 2024-05-23 ENCOUNTER — HOSPITAL ENCOUNTER (OUTPATIENT)
Dept: INFUSION THERAPY | Facility: HOSPITAL | Age: 69
Discharge: HOME | End: 2024-05-23
Admitting: ALLERGY & IMMUNOLOGY
Payer: MEDICARE

## 2024-05-23 VITALS
SYSTOLIC BLOOD PRESSURE: 113 MMHG | OXYGEN SATURATION: 98 % | RESPIRATION RATE: 18 BRPM | HEART RATE: 74 BPM | DIASTOLIC BLOOD PRESSURE: 65 MMHG

## 2024-05-23 DIAGNOSIS — Z95.828 PORT-A-CATH IN PLACE: ICD-10-CM

## 2024-05-23 DIAGNOSIS — D80.1 COMMON VARIABLE AGAMMAGLOBULINEMIA: Primary | ICD-10-CM

## 2024-05-23 PROCEDURE — 96365 THER/PROPH/DIAG IV INF INIT: CPT

## 2024-05-23 PROCEDURE — 96366 THER/PROPH/DIAG IV INF ADDON: CPT

## 2024-05-23 PROCEDURE — 25010000002 HEPARIN LOCK FLUSH PER 10 UNITS: Performed by: ALLERGY & IMMUNOLOGY

## 2024-05-23 PROCEDURE — 25010000002 IMMUNE GLOBULIN (HUMAN) 20 GM/200ML SOLUTION: Performed by: ALLERGY & IMMUNOLOGY

## 2024-05-23 PROCEDURE — 25810000003 SODIUM CHLORIDE 0.9 % SOLUTION: Performed by: ALLERGY & IMMUNOLOGY

## 2024-05-23 RX ORDER — HEPARIN SODIUM (PORCINE) LOCK FLUSH IV SOLN 100 UNIT/ML 100 UNIT/ML
500 SOLUTION INTRAVENOUS AS NEEDED
OUTPATIENT
Start: 2024-05-23

## 2024-05-23 RX ORDER — DIPHENHYDRAMINE HCL 25 MG
50 CAPSULE ORAL ONCE
Start: 2024-06-20 | End: 2024-06-20

## 2024-05-23 RX ORDER — ACETAMINOPHEN 325 MG/1
325 TABLET ORAL ONCE
OUTPATIENT
Start: 2024-06-20

## 2024-05-23 RX ORDER — DIPHENHYDRAMINE HCL 25 MG
50 CAPSULE ORAL ONCE AS NEEDED
Start: 2024-06-20

## 2024-05-23 RX ORDER — METHYLPREDNISOLONE SODIUM SUCCINATE 125 MG/2ML
50 INJECTION, POWDER, LYOPHILIZED, FOR SOLUTION INTRAMUSCULAR; INTRAVENOUS ONCE AS NEEDED
Start: 2024-06-20

## 2024-05-23 RX ORDER — SODIUM CHLORIDE 0.9 % (FLUSH) 0.9 %
10 SYRINGE (ML) INJECTION AS NEEDED
Status: DISCONTINUED | OUTPATIENT
Start: 2024-05-23 | End: 2024-05-25 | Stop reason: HOSPADM

## 2024-05-23 RX ORDER — PREDNISONE 20 MG/1
40 TABLET ORAL ONCE AS NEEDED
Start: 2024-06-20

## 2024-05-23 RX ORDER — DIPHENHYDRAMINE HCL 25 MG
50 CAPSULE ORAL ONCE
Status: DISCONTINUED | OUTPATIENT
Start: 2024-05-23 | End: 2024-05-25 | Stop reason: HOSPADM

## 2024-05-23 RX ORDER — EPINEPHRINE 1 MG/ML
0.3 INJECTION, SOLUTION INTRAMUSCULAR; SUBCUTANEOUS ONCE AS NEEDED
Start: 2024-06-20

## 2024-05-23 RX ORDER — PREDNISONE 20 MG/1
40 TABLET ORAL ONCE
Status: DISCONTINUED | OUTPATIENT
Start: 2024-05-23 | End: 2024-05-25 | Stop reason: HOSPADM

## 2024-05-23 RX ORDER — PREDNISONE 20 MG/1
40 TABLET ORAL ONCE
Start: 2024-06-20 | End: 2024-06-20

## 2024-05-23 RX ORDER — SODIUM CHLORIDE 0.9 % (FLUSH) 0.9 %
10 SYRINGE (ML) INJECTION AS NEEDED
OUTPATIENT
Start: 2024-05-23

## 2024-05-23 RX ORDER — EPINEPHRINE 1 MG/ML
0.3 INJECTION, SOLUTION INTRAMUSCULAR; SUBCUTANEOUS ONCE AS NEEDED
Status: DISCONTINUED | OUTPATIENT
Start: 2024-05-23 | End: 2024-05-25 | Stop reason: HOSPADM

## 2024-05-23 RX ORDER — HEPARIN SODIUM (PORCINE) LOCK FLUSH IV SOLN 100 UNIT/ML 100 UNIT/ML
500 SOLUTION INTRAVENOUS AS NEEDED
Status: DISCONTINUED | OUTPATIENT
Start: 2024-05-23 | End: 2024-05-25 | Stop reason: HOSPADM

## 2024-05-23 RX ORDER — ACETAMINOPHEN 325 MG/1
325 TABLET ORAL ONCE
Status: DISCONTINUED | OUTPATIENT
Start: 2024-05-23 | End: 2024-05-25 | Stop reason: HOSPADM

## 2024-05-23 RX ORDER — PREDNISONE 20 MG/1
40 TABLET ORAL ONCE AS NEEDED
Status: DISCONTINUED | OUTPATIENT
Start: 2024-05-23 | End: 2024-05-25 | Stop reason: HOSPADM

## 2024-05-23 RX ORDER — METHYLPREDNISOLONE SODIUM SUCCINATE 125 MG/2ML
50 INJECTION, POWDER, LYOPHILIZED, FOR SOLUTION INTRAMUSCULAR; INTRAVENOUS ONCE AS NEEDED
Status: DISCONTINUED | OUTPATIENT
Start: 2024-05-23 | End: 2024-05-25 | Stop reason: HOSPADM

## 2024-05-23 RX ORDER — DIPHENHYDRAMINE HCL 25 MG
50 CAPSULE ORAL ONCE AS NEEDED
Status: DISCONTINUED | OUTPATIENT
Start: 2024-05-23 | End: 2024-05-25 | Stop reason: HOSPADM

## 2024-05-23 RX ADMIN — HEPARIN 500 UNITS: 100 SYRINGE at 13:19

## 2024-05-23 RX ADMIN — IMMUNE GLOBULIN INFUSION (HUMAN) 20 G: 100 INJECTION, SOLUTION INTRAVENOUS; SUBCUTANEOUS at 11:44

## 2024-05-23 RX ADMIN — Medication 10 ML: at 13:19

## 2024-05-23 RX ADMIN — IMMUNE GLOBULIN INFUSION (HUMAN) 20 G: 100 INJECTION, SOLUTION INTRAVENOUS; SUBCUTANEOUS at 09:54

## 2024-05-23 RX ADMIN — SODIUM CHLORIDE 500 ML: 9 INJECTION, SOLUTION INTRAVENOUS at 09:10

## 2024-05-30 DIAGNOSIS — J32.0 MAXILLARY SINUSITIS, UNSPECIFIED CHRONICITY: ICD-10-CM

## 2024-05-30 DIAGNOSIS — E11.9 TYPE 2 DIABETES MELLITUS WITHOUT COMPLICATION, WITH LONG-TERM CURRENT USE OF INSULIN: ICD-10-CM

## 2024-05-30 DIAGNOSIS — Z79.4 TYPE 2 DIABETES MELLITUS WITHOUT COMPLICATION, WITH LONG-TERM CURRENT USE OF INSULIN: ICD-10-CM

## 2024-06-12 ENCOUNTER — TELEPHONE (OUTPATIENT)
Dept: INTERNAL MEDICINE | Facility: CLINIC | Age: 69
End: 2024-06-12
Payer: MEDICARE

## 2024-06-12 NOTE — TELEPHONE ENCOUNTER
Caller: Raquel Mariee    Relationship: Self    Best call back number: [phone]     Requested Prescriptions:   [facility]       Pharmacy where request should be sent: [facility]     Last office visit with prescribing clinician: 3/27/2024   Last telemedicine visit with prescribing clinician: Visit date not found   Next office visit with prescribing clinician: 8/6/2024     Additional details provided by patient: REQUIRES A PRIOR AUTH...     Does the patient have less than a 3 day supply:  [x] Yes  [] No     Would you like a call back once the refill request has been completed: [] Yes [x] No    If the office needs to give you a call back, can they leave a voicemail: [x] Yes [] No    Andrew Zeng Rep   06/12/24 08:38 EDT

## 2024-06-12 NOTE — TELEPHONE ENCOUNTER
Called patient back, states the Prior Auth is for the Aimovig, not Mobic, started a PA on Aimovig on Cover my meds, waiting for outcome to come through.  KEY:[id number]

## 2024-06-13 RX ORDER — LEVOTHYROXINE SODIUM 0.05 MG/1
50 TABLET ORAL DAILY
Qty: 90 TABLET | Refills: 3 | Status: SHIPPED | OUTPATIENT
Start: 2024-06-13

## 2024-06-17 ENCOUNTER — OFFICE VISIT (OUTPATIENT)
Dept: CARDIOLOGY | Facility: CLINIC | Age: 69
End: 2024-06-17
Payer: MEDICARE

## 2024-06-17 VITALS
OXYGEN SATURATION: 97 % | HEIGHT: 64 IN | WEIGHT: 214 LBS | HEART RATE: 78 BPM | BODY MASS INDEX: 36.54 KG/M2 | SYSTOLIC BLOOD PRESSURE: 130 MMHG | DIASTOLIC BLOOD PRESSURE: 82 MMHG

## 2024-06-17 DIAGNOSIS — I35.0 AORTIC STENOSIS, MODERATE: Primary | ICD-10-CM

## 2024-06-17 DIAGNOSIS — I50.32 CHRONIC DIASTOLIC HEART FAILURE: ICD-10-CM

## 2024-06-17 DIAGNOSIS — I20.1 PRINZMETAL ANGINA: ICD-10-CM

## 2024-06-17 DIAGNOSIS — N18.30 STAGE 3 CHRONIC KIDNEY DISEASE, UNSPECIFIED WHETHER STAGE 3A OR 3B CKD: ICD-10-CM

## 2024-06-17 PROCEDURE — 99213 OFFICE O/P EST LOW 20 MIN: CPT | Performed by: INTERNAL MEDICINE

## 2024-06-17 PROCEDURE — 3079F DIAST BP 80-89 MM HG: CPT | Performed by: INTERNAL MEDICINE

## 2024-06-17 PROCEDURE — 3075F SYST BP GE 130 - 139MM HG: CPT | Performed by: INTERNAL MEDICINE

## 2024-06-17 NOTE — PROGRESS NOTES
[facility] Cardiology Office Follow Up Note    Raquel Mariee  [id number]  2024    Primary Care Provider: Sanjeev Rawls MD    Chief Complaint: Routine follow-up    History of Present Illness:   Mrs. Raquel Mariee is a 68y.o. female who presents to the Cardiology Clinic for routine follow-up.   The patient has a past medical history significant for common variable immunodeficiency on chronic IVIG infusions, stage III chronic kidney disease, hypothyroidism, anemia, type 2 diabetes mellitus, and hypertension.  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  Today, the patient continues to do well from a cardiology standpoint.  She is active at home, and denies any significant exertional dyspnea.  No palpitations.  No history of presyncopal or syncopal events.  No specific complaints today.    Past Cardiac Testin. Last Coronary Angio: None  2. Prior Stress Testing: Unknown  3. Last Echo: 3/24  1.  Normal left ventricular size and systolic function, LVEF 65-70%.  2.  Mild concentric LVH.  3.  Grade 2 diastolic dysfunction.  4.  Normal right ventricular size and systolic function.  5.  Moderately increased left atrial volume index.  6.  Moderate calcification and thickening of the aortic valve with moderate aortic stenosis.  7.  Mild aortic regurgitation.  4. Prior Holter Monitor: None       Review of Systems:   Review of Systems   Constitutional:  Negative for activity change, appetite change, chills, diaphoresis, fatigue, fever, unexpected weight gain and unexpected weight loss.   Eyes:  Negative for blurred vision and double vision.   Respiratory:  Negative for cough, chest tightness, shortness of breath and wheezing.    Cardiovascular:  Negative for chest pain, palpitations and leg swelling.   Gastrointestinal:  Negative for abdominal pain, anal bleeding, blood in stool  and GERD.   Endocrine: Negative for cold intolerance and heat intolerance.   Genitourinary:  Negative for hematuria.   Neurological:  Negative for dizziness, syncope, weakness and light-headedness.   Hematological:  Does not bruise/bleed easily.   Psychiatric/Behavioral:  Negative for depressed mood and stress. The patient is not nervous/anxious.        I have reviewed and/or updated the patient's past medical, past surgical, family, social history, problem list and allergies as appropriate.     Medications:     Current Outpatient Medications:     acetaminophen-codeine (TYLENOL with CODEINE #3) 300-30 MG per tablet, Take 1 tablet by mouth Every 4 (Four) Hours As Needed for Moderate Pain., Disp: 30 tablet, Rfl: 0    albuterol sulfate  (90 Base) MCG/ACT inhaler, Inhale 2 puffs by mouth every 4 to 6 hours as needed for cough, wheeze, shortness of breath. Use with vortex spacer, Disp: 8.5 g, Rfl: 3    allopurinol (ZYLOPRIM) 100 MG tablet, Take 1 tablet by mouth Daily., Disp: 90 tablet, Rfl: 3    betamethasone valerate (VALISONE) 0.1 % cream, Apply topically to the appropriate area as directed Daily. (Patient taking differently: Apply  topically to the appropriate area as directed Daily As Needed.), Disp: 45 g, Rfl: 11    Budeson-Glycopyrrol-Formoterol (Breztri Aerosphere) 160-9-4.8 MCG/ACT aerosol inhaler, Inhale 2 puffs by mouth twice daily. Rinse mouth after use, Disp: 10.7 g, Rfl: 11    buPROPion SR (WELLBUTRIN SR) 150 MG 12 hr tablet, Take 1 tablet by mouth 2 (Two) Times a Day., Disp: 180 tablet, Rfl: 3    calcium carbonate (Antacid Maximum) 1000 MG chewable tablet, Chew 500 mg 3 (Three) Times a Day., Disp: , Rfl:     cholecalciferol (VITAMIN D3) 25 MCG (1000 UT) tablet, Take 1 tablet by mouth Daily., Disp: , Rfl:     cloNIDine (Catapres) 0.1 MG tablet, Take 1 tablet by mouth 2 (Two) Times a Day., Disp: 30 tablet, Rfl: 5    Dextromethorphan-guaiFENesin (MUCINEX DM PO), Take 2 tablets by mouth 2 (Two) Times a  Day., Disp: , Rfl:     dicyclomine (BENTYL) 10 MG capsule, Take 1 capsule by mouth 3 (Three) Times a Day., Disp: 270 capsule, Rfl: 3    diphenhydrAMINE (Benadryl Allergy) 25 MG tablet, Take 1-2 tablets by mouth Every 4-6 Hours As Needed., Disp: 90 tablet, Rfl: 11    Erenumab-aooe (Aimovig) 140 MG/ML auto-injector, Inject 1 mL under the skin into the appropriate area as directed Every 30 (Thirty) Days., Disp: 1 mL, Rfl: 5    estradiol (ESTRACE) 0.1 MG/GM vaginal cream, Insert 0.5 grams vaginally twice weekly, Disp: 42.5 g, Rfl: 5    ferrous sulfate 325 (65 FE) MG EC tablet, Take 2 tablets by mouth Daily With Breakfast., Disp: , Rfl:     fexofenadine (ALLEGRA) 180 MG tablet, Take 1 tablet by mouth Daily., Disp: , Rfl:     Fluticasone Propionate (Xhance) 93 MCG/ACT Exhaler Suspension, Instill 1 spray in both nostrils twice a day, Disp: 16 mL, Rfl: 11    furosemide (LASIX) 40 MG tablet, Take 1 tablet by mouth Daily, twice a month (Patient taking differently: Take 1 tablet by mouth Daily. With infusions), Disp: 30 tablet, Rfl: 1    glucosamine-chondroitin 500-400 MG capsule capsule, Take 1 capsule by mouth 2 (Two) Times a Day With Meals., Disp: , Rfl:     Hypertonic Nasal Wash (Sinus Rinse Kit) pack, use once or twice daily as needed, Disp: 1 each, Rfl: 3    immune globulin, human, (Gammagard) 20 GM/200ML solution infusion, Infuse 40 g into a venous catheter Every 28 (Twenty-Eight) Days., Disp: 400 mL, Rfl: 0    ipratropium-albuterol (DUO-NEB) 0.5-2.5 mg/3 ml nebulizer, Inhale the contents of 1 vial through a nebulizer every 4-6 hours as needed for cough,wheezing and shortness of breath., Disp: 90 mL, Rfl: 3    isosorbide mononitrate (IMDUR) 30 MG 24 hr tablet, Take 1 tablet by mouth Every Morning., Disp: 90 tablet, Rfl: 3    levothyroxine (SYNTHROID, LEVOTHROID) 50 MCG tablet, Take 1 tablet by mouth Daily., Disp: 90 tablet, Rfl: 3    linaclotide (Linzess) 145 MCG capsule capsule, Take 1 capsule by mouth Every Morning  Before Breakfast., Disp: 30 capsule, Rfl: 11    Magnesium 250 MG tablet, Take 2 tablets by mouth 2 (Two) Times a Day., Disp: , Rfl:     Mepolizumab (Nucala) 100 MG/ML solution auto-injector, Inject 1 mL under the skin into the appropriate area as directed Every 28 (Twenty-Eight) Days. Obtaining medication from [place] to [facility], Disp: , Rfl:     montelukast (SINGULAIR) 10 MG tablet, Take 1 tablet by mouth every night at bedtime., Disp: 90 tablet, Rfl: 3    montelukast (SINGULAIR) 10 MG tablet, Take 1 tablet by mouth every night at bedtime., Disp: 30 tablet, Rfl: 11    multivitamin with minerals tablet tablet, Take 1 tablet by mouth Daily., Disp: , Rfl:     omeprazole (priLOSEC) 40 MG capsule, Take 1 capsule by mouth 2 (Two) Times a Day., Disp: 180 capsule, Rfl: 3    ondansetron (ZOFRAN) 4 MG tablet, Take 1 tablet by mouth Every 8 (Eight) Hours As Needed for Nausea or Vomiting., Disp: 30 tablet, Rfl: 11    POTASSIUM CHLORIDE PO, Take 20 mEq by mouth Take As Directed. Takes two times a month with Lasix, Disp: , Rfl:     potassium citrate (UROCIT-K) 10 MEQ (1080 MG) CR tablet, Take 1 tablet by mouth Daily., Disp: 90 tablet, Rfl: 3    tiZANidine (ZANAFLEX) 4 MG tablet, Take 1 tablet by mouth Every Night., Disp: 90 tablet, Rfl: 3    ubrogepant (Ubrelvy) 100 MG tablet, Take 1 tablet by mouth 1 (One) Time As Needed (migraine)., Disp: 6 tablet, Rfl: 0    valsartan (DIOVAN) 160 MG tablet, Take 1 tablet by mouth Daily., Disp: 90 tablet, Rfl: 3    vitamin B-12 (CYANOCOBALAMIN) 1000 MCG tablet, Take 1 tablet by mouth Daily., Disp: , Rfl:     fluticasone (FLONASE) 50 MCG/ACT nasal spray, Instill 1 spray into each nostril as directed by provider Daily. (Patient not taking: Reported on 6/17/2024), Disp: 16 g, Rfl: 1    ipratropium (ATROVENT) 0.06 % nasal spray, Instill 1-2 sprays each nostril 2-3 times daily as needed (Patient not taking: Reported on 6/17/2024), Disp: 15 mL, Rfl: 3    Physical Exam:  Vital Signs:   Vitals:     "06/17/24 1357   BP: 130/82   BP Location: Left arm   Patient Position: Sitting   Cuff Size: Adult   Pulse: 78   SpO2: 97%   Weight: 97.1 kg (214 lb)   Height: 162.6 cm (64\")       Physical Exam  Constitutional:       General: She is not in acute distress.     Appearance: Normal appearance. She is well-developed. She is not diaphoretic.   HENT:      Head: Normocephalic and atraumatic.   Eyes:      General: No scleral icterus.     Pupils: Pupils are equal, round, and reactive to light.   Neck:      Trachea: No tracheal deviation.   Cardiovascular:      Rate and Rhythm: Normal rate and regular rhythm.      Heart sounds: Murmur heard.      No friction rub. No gallop.      Comments: Normal JVD.    Pulmonary:      Effort: Pulmonary effort is normal. No respiratory distress.      Breath sounds: Normal breath sounds. No stridor. No wheezing or rales.   Chest:      Chest wall: No tenderness.   Abdominal:      General: Bowel sounds are normal. There is no distension.      Palpations: Abdomen is soft.      Tenderness: There is no abdominal tenderness. There is no guarding or rebound.   Musculoskeletal:         General: No swelling. Normal range of motion.      Cervical back: Neck supple. No tenderness.   Lymphadenopathy:      Cervical: No cervical adenopathy.   Skin:     General: Skin is warm and dry.      Findings: No erythema.   Neurological:      General: No focal deficit present.      Mental Status: She is alert and oriented to person, place, and time.   Psychiatric:         Mood and Affect: Mood normal.         Behavior: Behavior normal.         Results Review:   I reviewed the patient's new clinical results.        Assessment / Plan:     1. Aortic stenosis  -- Recent echocardiogram shows aortic stenosis remains moderate  -- Remains asymptomatic  --If progression to severe or development of symptoms related to aortic stenosis will refer for TAVR  -- Follow-up in 6 months     2. Chronic diastolic heart failure  -- Jonathan " well compensated     3.  Prinzmetal angina   -- No recent anginal symptoms  --Continue isosorbide as antianginal     4. Common variable immunodeficiency   --Managed by immunology     5.  Chronic kidney disease, stage III  -- GFR 66 on last labs in 7/23     6.  Obesity, BMI 36 kg/m²  --Encouraged continued efforts at weight loss through diet and exercise    Follow Up:   Return in about 6 months (around 12/17/2024).      Thank you for allowing me to participate in the care of your patient. Please to not hesitate to contact me with additional questions or concerns.     SENTHIL Lovelace MD  Interventional Cardiology   06/17/2024  13:53 EDT

## 2024-06-21 ENCOUNTER — HOSPITAL ENCOUNTER (OUTPATIENT)
Dept: INFUSION THERAPY | Facility: HOSPITAL | Age: 69
Discharge: HOME | End: 2024-06-21
Payer: MEDICARE

## 2024-06-21 VITALS
RESPIRATION RATE: 18 BRPM | OXYGEN SATURATION: 97 % | TEMPERATURE: 98.9 F | HEART RATE: 70 BPM | SYSTOLIC BLOOD PRESSURE: 137 MMHG | WEIGHT: 214 LBS | BODY MASS INDEX: 36.73 KG/M2 | DIASTOLIC BLOOD PRESSURE: 84 MMHG

## 2024-06-21 DIAGNOSIS — Z95.828 PORT-A-CATH IN PLACE: Primary | ICD-10-CM

## 2024-06-21 DIAGNOSIS — D80.1 COMMON VARIABLE AGAMMAGLOBULINEMIA: ICD-10-CM

## 2024-06-21 PROCEDURE — 25810000003 SODIUM CHLORIDE 0.9 % SOLUTION: Performed by: ALLERGY & IMMUNOLOGY

## 2024-06-21 PROCEDURE — 25010000002 IMMUNE GLOBULIN (HUMAN) 20 GM/200ML SOLUTION: Performed by: ALLERGY & IMMUNOLOGY

## 2024-06-21 PROCEDURE — 96415 CHEMO IV INFUSION ADDL HR: CPT

## 2024-06-21 PROCEDURE — 25010000002 HEPARIN LOCK FLUSH PER 10 UNITS: Performed by: ALLERGY & IMMUNOLOGY

## 2024-06-21 PROCEDURE — 96413 CHEMO IV INFUSION 1 HR: CPT

## 2024-06-21 RX ORDER — DIPHENHYDRAMINE HCL 25 MG
50 CAPSULE ORAL ONCE AS NEEDED
Start: 2024-07-19

## 2024-06-21 RX ORDER — METHYLPREDNISOLONE SODIUM SUCCINATE 125 MG/2ML
50 INJECTION, POWDER, LYOPHILIZED, FOR SOLUTION INTRAMUSCULAR; INTRAVENOUS ONCE AS NEEDED
Start: 2024-07-19

## 2024-06-21 RX ORDER — ACETAMINOPHEN 325 MG/1
325 TABLET ORAL ONCE
OUTPATIENT
Start: 2024-07-19

## 2024-06-21 RX ORDER — DIPHENHYDRAMINE HCL 25 MG
50 CAPSULE ORAL ONCE
Start: 2024-07-19 | End: 2024-07-19

## 2024-06-21 RX ORDER — PREDNISONE 20 MG/1
40 TABLET ORAL ONCE AS NEEDED
Status: DISCONTINUED | OUTPATIENT
Start: 2024-06-21 | End: 2024-06-23 | Stop reason: HOSPADM

## 2024-06-21 RX ORDER — PREDNISONE 20 MG/1
40 TABLET ORAL ONCE
Start: 2024-07-19 | End: 2024-07-19

## 2024-06-21 RX ORDER — HEPARIN SODIUM (PORCINE) LOCK FLUSH IV SOLN 100 UNIT/ML 100 UNIT/ML
500 SOLUTION INTRAVENOUS AS NEEDED
OUTPATIENT
Start: 2024-06-21

## 2024-06-21 RX ORDER — METHYLPREDNISOLONE SODIUM SUCCINATE 125 MG/2ML
50 INJECTION, POWDER, LYOPHILIZED, FOR SOLUTION INTRAMUSCULAR; INTRAVENOUS ONCE AS NEEDED
Status: DISCONTINUED | OUTPATIENT
Start: 2024-06-21 | End: 2024-06-23 | Stop reason: HOSPADM

## 2024-06-21 RX ORDER — PREDNISONE 20 MG/1
40 TABLET ORAL ONCE AS NEEDED
Start: 2024-07-19

## 2024-06-21 RX ORDER — EPINEPHRINE 1 MG/ML
0.3 INJECTION, SOLUTION INTRAMUSCULAR; SUBCUTANEOUS ONCE AS NEEDED
Start: 2024-07-19

## 2024-06-21 RX ORDER — DIPHENHYDRAMINE HCL 25 MG
50 CAPSULE ORAL ONCE
Status: DISCONTINUED | OUTPATIENT
Start: 2024-06-21 | End: 2024-06-23 | Stop reason: HOSPADM

## 2024-06-21 RX ORDER — SODIUM CHLORIDE 0.9 % (FLUSH) 0.9 %
10 SYRINGE (ML) INJECTION AS NEEDED
Status: DISCONTINUED | OUTPATIENT
Start: 2024-06-21 | End: 2024-06-23 | Stop reason: HOSPADM

## 2024-06-21 RX ORDER — PREDNISONE 20 MG/1
40 TABLET ORAL ONCE
Status: DISCONTINUED | OUTPATIENT
Start: 2024-06-21 | End: 2024-06-23 | Stop reason: HOSPADM

## 2024-06-21 RX ORDER — DIPHENHYDRAMINE HCL 25 MG
50 CAPSULE ORAL ONCE AS NEEDED
Status: DISCONTINUED | OUTPATIENT
Start: 2024-06-21 | End: 2024-06-23 | Stop reason: HOSPADM

## 2024-06-21 RX ORDER — SODIUM CHLORIDE 0.9 % (FLUSH) 0.9 %
10 SYRINGE (ML) INJECTION AS NEEDED
OUTPATIENT
Start: 2024-06-21

## 2024-06-21 RX ORDER — ACETAMINOPHEN 325 MG/1
325 TABLET ORAL ONCE
Status: DISCONTINUED | OUTPATIENT
Start: 2024-06-21 | End: 2024-06-23 | Stop reason: HOSPADM

## 2024-06-21 RX ORDER — EPINEPHRINE 1 MG/ML
0.3 INJECTION, SOLUTION INTRAMUSCULAR; SUBCUTANEOUS ONCE AS NEEDED
Status: DISCONTINUED | OUTPATIENT
Start: 2024-06-21 | End: 2024-06-23 | Stop reason: HOSPADM

## 2024-06-21 RX ORDER — HEPARIN SODIUM (PORCINE) LOCK FLUSH IV SOLN 100 UNIT/ML 100 UNIT/ML
500 SOLUTION INTRAVENOUS AS NEEDED
Status: DISCONTINUED | OUTPATIENT
Start: 2024-06-21 | End: 2024-06-23 | Stop reason: HOSPADM

## 2024-06-21 RX ADMIN — IMMUNE GLOBULIN INFUSION (HUMAN) 20 G: 100 INJECTION, SOLUTION INTRAVENOUS; SUBCUTANEOUS at 11:44

## 2024-06-21 RX ADMIN — IMMUNE GLOBULIN INFUSION (HUMAN) 20 G: 100 INJECTION, SOLUTION INTRAVENOUS; SUBCUTANEOUS at 09:50

## 2024-06-21 RX ADMIN — SODIUM CHLORIDE 500 ML: 9 INJECTION, SOLUTION INTRAVENOUS at 09:05

## 2024-06-21 RX ADMIN — HEPARIN 500 UNITS: 100 SYRINGE at 13:23

## 2024-06-21 RX ADMIN — Medication 10 ML: at 13:23

## 2024-06-28 DIAGNOSIS — Z79.4 TYPE 2 DIABETES MELLITUS WITHOUT COMPLICATION, WITH LONG-TERM CURRENT USE OF INSULIN: ICD-10-CM

## 2024-06-28 DIAGNOSIS — E11.9 TYPE 2 DIABETES MELLITUS WITHOUT COMPLICATION, WITH LONG-TERM CURRENT USE OF INSULIN: ICD-10-CM

## 2024-06-28 RX ORDER — TIZANIDINE 4 MG/1
4 TABLET ORAL 2 TIMES DAILY PRN
Qty: 100 TABLET | Refills: 3 | Status: SHIPPED | OUTPATIENT
Start: 2024-06-28

## 2024-07-12 ENCOUNTER — OFFICE VISIT (OUTPATIENT)
Dept: PULMONOLOGY | Facility: CLINIC | Age: 69
End: 2024-07-12
Payer: MEDICARE

## 2024-07-12 VITALS
WEIGHT: 215 LBS | HEART RATE: 74 BPM | HEIGHT: 64 IN | DIASTOLIC BLOOD PRESSURE: 84 MMHG | BODY MASS INDEX: 36.7 KG/M2 | SYSTOLIC BLOOD PRESSURE: 140 MMHG | OXYGEN SATURATION: 98 %

## 2024-07-12 DIAGNOSIS — J30.9 ALLERGIC RHINITIS, UNSPECIFIED SEASONALITY, UNSPECIFIED TRIGGER: ICD-10-CM

## 2024-07-12 DIAGNOSIS — R93.89 ABNORMAL CXR: ICD-10-CM

## 2024-07-12 DIAGNOSIS — J45.40 MODERATE PERSISTENT ASTHMA WITHOUT COMPLICATION: Primary | ICD-10-CM

## 2024-07-12 DIAGNOSIS — D80.1 COMMON VARIABLE AGAMMAGLOBULINEMIA: ICD-10-CM

## 2024-07-12 NOTE — PROGRESS NOTES
"     Follow Up Office Visit      Patient Name: Raquel Mariee    Chief Complaint:    Chief Complaint   Patient presents with    Breathing Problem       History of Present Illness: Raquel Mariee is a 68 y.o. female who is here today for follow up of asthma.  Since last visit, she has been doing well on Nucala and has had much fewer exacerbations with use.  Rarely requires her albuterol.  She reports compliance with Breztri.  She continues on IVIG for common variable immunodeficiency on IVIG.  No current cough or wheezing.  + History of thoracotomy in 2008 for trachea repair due to \"collapsed cartridge rings\".    Supplemental Oxygen: No    Subjective      Review of Systems:  Review of Systems   Constitutional:  Negative for fever and unexpected weight change.   HENT:  Positive for postnasal drip.    Respiratory:  Positive for shortness of breath. Negative for cough and wheezing.    Cardiovascular:  Negative for chest pain and leg swelling.   Allergic/Immunologic: Positive for immunocompromised state.        Past Medical History:   Past Medical History:   Diagnosis Date    Anxiety     Aortic valve stenosis     Cardiac murmur     Cataract, bilateral     CHF (congestive heart failure)     Chronic kidney disease     Stage III    Clostridium difficile infection     in her 30s    Common variable immunodeficiency     IVIG infusions    COPD (chronic obstructive pulmonary disease)     Depression     Diabetes mellitus     type II    Eosinophilic asthma     Fibromyalgia, primary     Full dentures     GERD (gastroesophageal reflux disease)     History of transfusion     in 1979 at [facility].  No reaction noted, patient states she does have an antibody from transfusion.    Hypertension     Hypogammaglobulinemia     Hypothyroidism     Irritable bowel syndrome     Migraines     Morbid obesity     Osteoarthritis     Prinzmetal angina 1990    Pseudomonas infection 1998    lungs    PTSD (post-traumatic stress " disorder)     Pulmonary edema     Renal insufficiency     Sinus tachycardia 1990    Sleep apnea     no longer uses/needs cpap    Tachycardia     TIA (transient ischemic attack) 2017    Trochanteric bursitis 01/25/2023       Past Surgical History:   Past Surgical History:   Procedure Laterality Date    APPENDECTOMY      BUNIONECTOMY Bilateral     CARDIAC CATHETERIZATION  2017    no stents needed    CARPAL TUNNEL RELEASE Right     COLONOSCOPY  2021    ENDOMETRIAL ABLATION  1997    EYE SURGERY  ? About 6-8 years ago    Laser for glaucoma    FRACTURE SURGERY  1995    No hardware; Tibeal plateau    GASTRIC BYPASS      HAND SURGERY Left     No hardware    HEMORRHOIDECTOMY N/A 04/09/2024    Procedure: HEMORRHOID BANDING X2;  Surgeon: Melissa Beck MD;  Location: [facility] OR;  Service: General;  Laterality: N/A;    INTERSTIM PLACEMENT N/A 05/03/2023    Procedure: INTERSTIM STAGES 1 AND 2 LEAD AND GENERATOR PLACEMENT;  Surgeon: Abner Nation MD;  Location: [facility] OR;  Service: Urology;  Laterality: N/A;    POSTERIOR VAGINAL REPAIR N/A 08/02/2023    Procedure: POSTERIOR COLPORRHAPHY;  Surgeon: Kellen Galan MD;  Location:  [facility] OR;  Service: Gynecology;  Laterality: N/A;    RECTAL EXAMINATION UNDER ANESTHESIA N/A 04/09/2024    Procedure: RECTAL EXAM UNDER ANESTHESIA;  Surgeon: Melissa Beck MD;  Location: [facility] OR;  Service: General;  Laterality: N/A;    REPLACEMENT TOTAL KNEE BILATERAL      TEETH EXTRACTION      all of bottom teeth removed    THORACOTOMY      TONSILLECTOMY      TOTAL ABDOMINAL HYSTERECTOMY WITH SALPINGO OOPHORECTOMY      TRACHEAL SURGERY      Repaired via thoracotomy    TUBAL ABDOMINAL LIGATION  1983    VENOUS ACCESS DEVICE (PORT) INSERTION      port a cath in the left chest wall       Family History:   Family History   Problem Relation Age of Onset    Diabetes Mother     Stroke Mother     Heart disease Mother     Hypertension Mother     Endometriosis Mother     Depression Mother      Diabetes Father     Hypertension Father     Stroke Father     Heart attack Father     Asthma Father     COPD Father     Dementia Father     Heart disease Father     COPD Sister     Asthma Sister     Cancer Sister         Nasal cell skin    Brain cancer Sister     Diabetes Sister     Stroke Sister     Heart attack Sister     Endometriosis Sister     Depression Sister     Arthritis Sister         Rheumatoid    Hypertension Sister     Kidney disease Sister     Diabetes Sister     COPD Sister     Asthma Sister     Endometriosis Sister     Depression Sister     Cancer Sister         Glioma    Heart disease Sister     Heart attack Sister     Endometriosis Sister     Asthma Sister     Hypertension Sister     Kidney disease Sister         ESRD    COPD Brother     Asthma Brother         Turned into COPD    Diabetes Brother     Asthma Brother     COPD Brother     Diabetes Brother     Hypertension Brother     Asthma Daughter     Endometriosis Daughter     Osteoarthritis Daughter     Hypertension Daughter     Kidney failure Daughter     Irritable bowel syndrome Daughter     Anxiety disorder Daughter     Bipolar disorder Daughter     Depression Daughter     Self-Injurious Behavior  Daughter     Suicide Attempts Daughter     Mental illness Daughter         Bipolar and eating fisorder    Asthma Daughter     Endometriosis Daughter     Osteoarthritis Daughter     Asthma Son     ADD / ADHD Son     Alcohol abuse Son     Drug abuse Son     Breast cancer Maternal Cousin     Heart disease Maternal Grandfather     Heart disease Maternal Uncle     OCD Neg Hx     Paranoid behavior Neg Hx     Schizophrenia Neg Hx     Seizures Neg Hx     Ovarian cancer Neg Hx        Social History:   Social History     Socioeconomic History    Marital status:    Tobacco Use    Smoking status: Never     Passive exposure: Never    Smokeless tobacco: Never   Vaping Use    Vaping status: Never Used   Substance and Sexual Activity    Alcohol use: Yes      Comment: ONCE A YEAR    Drug use: Never    Sexual activity: Defer       Current Medications:     Current Outpatient Medications:     acetaminophen-codeine (TYLENOL with CODEINE #3) 300-30 MG per tablet, Take 1 tablet by mouth Every 4 (Four) Hours As Needed for Moderate Pain., Disp: 30 tablet, Rfl: 0    albuterol sulfate  (90 Base) MCG/ACT inhaler, Inhale 2 puffs by mouth every 4 to 6 hours as needed for cough, wheeze, shortness of breath. Use with vortex spacer, Disp: 8.5 g, Rfl: 3    allopurinol (ZYLOPRIM) 100 MG tablet, Take 1 tablet by mouth Daily., Disp: 90 tablet, Rfl: 3    betamethasone valerate (VALISONE) 0.1 % cream, Apply topically to the appropriate area as directed Daily. (Patient taking differently: Apply  topically to the appropriate area as directed Daily As Needed.), Disp: 45 g, Rfl: 11    Budeson-Glycopyrrol-Formoterol (Breztri Aerosphere) 160-9-4.8 MCG/ACT aerosol inhaler, Inhale 2 puffs by mouth twice daily. Rinse mouth after use, Disp: 10.7 g, Rfl: 11    buPROPion SR (WELLBUTRIN SR) 150 MG 12 hr tablet, Take 1 tablet by mouth 2 (Two) Times a Day., Disp: 180 tablet, Rfl: 3    calcium carbonate (Antacid Maximum) 1000 MG chewable tablet, Chew 500 mg 3 (Three) Times a Day., Disp: , Rfl:     cholecalciferol (VITAMIN D3) 25 MCG (1000 UT) tablet, Take 1 tablet by mouth Daily., Disp: , Rfl:     cloNIDine (Catapres) 0.1 MG tablet, Take 1 tablet by mouth 2 (Two) Times a Day., Disp: 30 tablet, Rfl: 5    Dextromethorphan-guaiFENesin (MUCINEX DM PO), Take 2 tablets by mouth 2 (Two) Times a Day., Disp: , Rfl:     diphenhydrAMINE (Benadryl Allergy) 25 MG tablet, Take 1-2 tablets by mouth Every 4-6 Hours As Needed., Disp: 90 tablet, Rfl: 11    Erenumab-aooe (Aimovig) 140 MG/ML auto-injector, Inject 1 mL under the skin into the appropriate area as directed Every 30 (Thirty) Days., Disp: 1 mL, Rfl: 5    estradiol (ESTRACE) 0.1 MG/GM vaginal cream, Insert 0.5 grams vaginally twice weekly, Disp: 42.5 g, Rfl:  5    ferrous sulfate 325 (65 FE) MG EC tablet, Take 2 tablets by mouth Daily With Breakfast., Disp: , Rfl:     fexofenadine (ALLEGRA) 180 MG tablet, Take 1 tablet by mouth Daily., Disp: , Rfl:     fluticasone (FLONASE) 50 MCG/ACT nasal spray, Instill 1 spray into each nostril as directed by provider Daily., Disp: 16 g, Rfl: 1    fluticasone (FLONASE) 50 MCG/ACT nasal spray, Instill 1 spray in each nostril twice daily as needed, Disp: 16 g, Rfl: 6    furosemide (LASIX) 40 MG tablet, Take 1 tablet by mouth Daily, twice a month (Patient taking differently: Take 1 tablet by mouth Daily. With infusions), Disp: 30 tablet, Rfl: 1    glucosamine-chondroitin 500-400 MG capsule capsule, Take 1 capsule by mouth 2 (Two) Times a Day With Meals., Disp: , Rfl:     Hypertonic Nasal Wash (Sinus Rinse Kit) pack, use once or twice daily as needed, Disp: 1 each, Rfl: 3    immune globulin, human, (Gammagard) 20 GM/200ML solution infusion, Infuse 40 g into a venous catheter Every 28 (Twenty-Eight) Days., Disp: 400 mL, Rfl: 0    ipratropium (ATROVENT) 0.06 % nasal spray, Instill 1-2 sprays each nostril 2-3 times daily as needed, Disp: 15 mL, Rfl: 3    ipratropium-albuterol (DUO-NEB) 0.5-2.5 mg/3 ml nebulizer, Inhale the contents of 1 vial through a nebulizer every 4-6 hours as needed for cough,wheezing and shortness of breath., Disp: 90 mL, Rfl: 3    isosorbide mononitrate (IMDUR) 30 MG 24 hr tablet, Take 1 tablet by mouth Every Morning., Disp: 90 tablet, Rfl: 3    levothyroxine (SYNTHROID, LEVOTHROID) 50 MCG tablet, Take 1 tablet by mouth Daily., Disp: 90 tablet, Rfl: 3    linaclotide (Linzess) 145 MCG capsule capsule, Take 1 capsule by mouth Every Morning Before Breakfast., Disp: 30 capsule, Rfl: 11    Magnesium 250 MG tablet, Take 2 tablets by mouth 2 (Two) Times a Day., Disp: , Rfl:     Mepolizumab (Nucala) 100 MG/ML solution auto-injector, Inject 1 mL under the skin into the appropriate area as directed Every 28 (Twenty-Eight) Days.  Obtaining medication from [facility], Disp: , Rfl:     montelukast (SINGULAIR) 10 MG tablet, Take 1 tablet by mouth every night at bedtime., Disp: 90 tablet, Rfl: 3    montelukast (SINGULAIR) 10 MG tablet, Take 1 tablet by mouth every night at bedtime., Disp: 30 tablet, Rfl: 11    multivitamin with minerals tablet tablet, Take 1 tablet by mouth Daily., Disp: , Rfl:     omeprazole (priLOSEC) 40 MG capsule, Take 1 capsule by mouth 2 (Two) Times a Day., Disp: 180 capsule, Rfl: 3    ondansetron (ZOFRAN) 4 MG tablet, Take 1 tablet by mouth Every 8 (Eight) Hours As Needed for Nausea or Vomiting., Disp: 30 tablet, Rfl: 11    POTASSIUM CHLORIDE PO, Take 20 mEq by mouth Take As Directed. Takes two times a month with Lasix, Disp: , Rfl:     potassium citrate (UROCIT-K) 10 MEQ (1080 MG) CR tablet, Take 1 tablet by mouth Daily., Disp: 90 tablet, Rfl: 3    tiZANidine (ZANAFLEX) 4 MG tablet, Take 1 tablet by mouth 2 (Two) Times a Day As Needed for Muscle Spasms., Disp: 100 tablet, Rfl: 3    ubrogepant (Ubrelvy) 100 MG tablet, Take 1 tablet by mouth 1 (One) Time As Needed (migraine)., Disp: 6 tablet, Rfl: 0    valsartan (DIOVAN) 160 MG tablet, Take 1 tablet by mouth Daily., Disp: 90 tablet, Rfl: 3    vitamin B-12 (CYANOCOBALAMIN) 1000 MCG tablet, Take 1 tablet by mouth Daily., Disp: , Rfl:     Fluticasone Propionate (Xhance) 93 MCG/ACT Exhaler Suspension, Instill 1 spray in both nostrils twice a day, Disp: 16 mL, Rfl: 11     Allergies:   Allergies   Allergen Reactions    Cefaclor Hives and Swelling     Other reaction(s): SWELLING  Shed 4 layers of skin and extremely SOB  Cesario Bishop's syndrome        Cephalexin Other (See Comments)     Cesario-Bishop's syndrome      Cephalosporins Hives, Swelling and Angioedema     Rechallenge with cefipime caused rash on 1/14/08.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    Verapamil Nausea And Vomiting     Dizzy    Zolpidem Other (See Comments) and Unknown (See Comments)     Automatic behaviour in sleep.  confusion    Amoxicillin Rash    Edecrin [Ethacrynic Acid] Rash and Other (See Comments)     Weight gain    Hydrochlorothiazide Hives    Sulfa Antibiotics Hives       Objective     Physical Exam:  Vital Signs:   Vitals:    07/12/24 0919   BP: 140/84   Pulse: 74   SpO2: 98%   Weight: 97.5 kg (215 lb)   Height: 162.6 cm (64\")     Body mass index is 36.9 kg/m².    Physical Exam  Vitals reviewed.   Constitutional:       General: She is not in acute distress.     Appearance: She is not toxic-appearing.   HENT:      Head: Normocephalic and atraumatic.      Mouth/Throat:      Mouth: Mucous membranes are moist.   Eyes:      Conjunctiva/sclera: Conjunctivae normal.   Cardiovascular:      Rate and Rhythm: Normal rate.      Heart sounds: Murmur heard.   Pulmonary:      Effort: Pulmonary effort is normal.      Breath sounds: Normal breath sounds.   Abdominal:      General: There is no distension.      Palpations: Abdomen is soft.   Musculoskeletal:         General: No swelling.      Cervical back: Neck supple.   Skin:     General: Skin is warm and dry.      Findings: No rash.   Neurological:      General: No focal deficit present.      Mental Status: She is alert and oriented to person, place, and time.   Psychiatric:         Mood and Affect: Mood normal.         Behavior: Behavior normal.       Results Review:   February 2024 CXR showed no acute lung disease. Progressive herniation of right lung " laterally through a thoracotomy defect. This is of doubtful clinical significance.    Results for orders placed in visit on 03/13/24    Adult Transthoracic Echo Complete W/ Cont if Necessary Per Protocol    Interpretation Summary  1.  Normal left ventricular size and systolic function, LVEF 65-70%.  2.  Mild concentric LVH.  3.  Grade 2 diastolic dysfunction.  4.  Normal right ventricular size and systolic function.  5.  Moderately increased left atrial volume index.  6.  Moderate calcification and thickening of the aortic valve with moderate aortic stenosis.  7.  Mild aortic regurgitation.    Assessment / Plan      Assessment/Plan:   Diagnoses and all orders for this visit:    1. Moderate persistent asthma without complication (Primary)  Well-controlled on current inhaled regimen and Nucala. We discussed the risk and benefits of inhaled corticosteroids. Patient instructed to take them on a regular basis as prescribed. Patient instructed to rinse their mouth out after each use.     2. Abnormal CXR  Most recent chest x-ray results reviewed and discussed with patient.  Findings related to her prior surgery for trachea repair. Can refer back to a CT surgeon in the future if needed, though findings do not appear to be clinically significant at this time.    3. Allergic rhinitis, unspecified seasonality, unspecified trigger  Continue current regimen.    4. Common variable agammaglobulinemia  Continues on IVIG       Follow Up:   Return in about 6 months (around 1/12/2025) for Recheck.  The patient was counseled on diagnostic results, risks and benefits of treatment options, risk factor modifications and the importance of treatment compliance. The patient was advised to contact the clinic with concerns or worsening symptoms.     LILIBETH Hooker   [facility] [place]     This document has been electronically signed by LILIBETH Hooker  July 12, 2024

## 2024-07-22 RX ORDER — POTASSIUM CITRATE 10 MEQ/1
10 TABLET, EXTENDED RELEASE ORAL DAILY
Qty: 90 TABLET | Refills: 3 | Status: SHIPPED | OUTPATIENT
Start: 2024-07-22

## 2024-07-28 ENCOUNTER — PATIENT MESSAGE (OUTPATIENT)
Dept: INTERNAL MEDICINE | Facility: CLINIC | Age: 69
End: 2024-07-28
Payer: MEDICARE

## 2024-07-29 ENCOUNTER — OFFICE VISIT (OUTPATIENT)
Dept: INTERNAL MEDICINE | Facility: CLINIC | Age: 69
End: 2024-07-29
Payer: MEDICARE

## 2024-07-29 VITALS
DIASTOLIC BLOOD PRESSURE: 84 MMHG | SYSTOLIC BLOOD PRESSURE: 120 MMHG | WEIGHT: 213 LBS | HEIGHT: 64 IN | BODY MASS INDEX: 36.37 KG/M2 | TEMPERATURE: 97.3 F | OXYGEN SATURATION: 97 % | HEART RATE: 80 BPM

## 2024-07-29 DIAGNOSIS — J01.10 ACUTE NON-RECURRENT FRONTAL SINUSITIS: ICD-10-CM

## 2024-07-29 DIAGNOSIS — J34.9 SINUS PROBLEM: Primary | ICD-10-CM

## 2024-07-29 LAB
EXPIRATION DATE: NORMAL
FLUAV AG UPPER RESP QL IA.RAPID: NOT DETECTED
FLUBV AG UPPER RESP QL IA.RAPID: NOT DETECTED
INTERNAL CONTROL: NORMAL
Lab: NORMAL
SARS-COV-2 AG UPPER RESP QL IA.RAPID: NOT DETECTED

## 2024-07-29 PROCEDURE — 87428 SARSCOV & INF VIR A&B AG IA: CPT | Performed by: STUDENT IN AN ORGANIZED HEALTH CARE EDUCATION/TRAINING PROGRAM

## 2024-07-29 PROCEDURE — 3044F HG A1C LEVEL LT 7.0%: CPT | Performed by: STUDENT IN AN ORGANIZED HEALTH CARE EDUCATION/TRAINING PROGRAM

## 2024-07-29 PROCEDURE — 3074F SYST BP LT 130 MM HG: CPT | Performed by: STUDENT IN AN ORGANIZED HEALTH CARE EDUCATION/TRAINING PROGRAM

## 2024-07-29 PROCEDURE — 99213 OFFICE O/P EST LOW 20 MIN: CPT | Performed by: STUDENT IN AN ORGANIZED HEALTH CARE EDUCATION/TRAINING PROGRAM

## 2024-07-29 PROCEDURE — 1160F RVW MEDS BY RX/DR IN RCRD: CPT | Performed by: STUDENT IN AN ORGANIZED HEALTH CARE EDUCATION/TRAINING PROGRAM

## 2024-07-29 PROCEDURE — 1126F AMNT PAIN NOTED NONE PRSNT: CPT | Performed by: STUDENT IN AN ORGANIZED HEALTH CARE EDUCATION/TRAINING PROGRAM

## 2024-07-29 PROCEDURE — 3079F DIAST BP 80-89 MM HG: CPT | Performed by: STUDENT IN AN ORGANIZED HEALTH CARE EDUCATION/TRAINING PROGRAM

## 2024-07-29 PROCEDURE — 1159F MED LIST DOCD IN RCRD: CPT | Performed by: STUDENT IN AN ORGANIZED HEALTH CARE EDUCATION/TRAINING PROGRAM

## 2024-07-29 RX ORDER — LEVOFLOXACIN 750 MG/1
750 TABLET, FILM COATED ORAL DAILY
Qty: 7 TABLET | Refills: 0 | Status: SHIPPED | OUTPATIENT
Start: 2024-07-29

## 2024-07-29 NOTE — TELEPHONE ENCOUNTER
From: Raquel Mariee  To: Sanjeev Rawls  Sent: 7/28/2024 4:46 PM EDT  Subject: Sinusitis.    It has been several months now. But, I have a sinus infection brewing. Alot of drainage. Coughing at night(3am). Only sl.expiratory wheezing occasionally. (I think Nucala is working.). Coughing up yellowish sputum, Facial pain, and occasional chills. Could you please order for me?

## 2024-07-30 NOTE — PROGRESS NOTES
Subjective   Raquel Mariee is a 68 y.o. female.     History of Present Illness  Patient presents with a week and a half of facial pain and nasal congestion.  Chills.  Dry cough.  Sinus pressure.  Sinus drainage.  Postnasal drip.      The following portions of the patient's history were reviewed and updated as appropriate: allergies, current medications, past family history, past medical history, past social history, past surgical history, and problem list.    Review of Systems   All other systems reviewed and are negative.      Objective   Physical Exam  Vitals and nursing note reviewed.   Constitutional:       Appearance: Normal appearance.   HENT:      Head: Normocephalic and atraumatic.      Right Ear: External ear normal.      Left Ear: External ear normal.      Nose: Nose normal.      Mouth/Throat:      Mouth: Mucous membranes are moist.      Pharynx: Oropharynx is clear. No oropharyngeal exudate or posterior oropharyngeal erythema.   Eyes:      Extraocular Movements: Extraocular movements intact.      Conjunctiva/sclera: Conjunctivae normal.      Pupils: Pupils are equal, round, and reactive to light.   Cardiovascular:      Rate and Rhythm: Normal rate and regular rhythm.      Pulses: Normal pulses.      Heart sounds: Normal heart sounds.   Pulmonary:      Effort: Pulmonary effort is normal.   Abdominal:      General: Abdomen is flat. Bowel sounds are normal.      Palpations: Abdomen is soft.   Musculoskeletal:         General: Normal range of motion.      Cervical back: Normal range of motion.   Skin:     General: Skin is warm.      Capillary Refill: Capillary refill takes less than 2 seconds.   Neurological:      General: No focal deficit present.      Mental Status: She is alert and oriented to person, place, and time. Mental status is at baseline.   Psychiatric:         Mood and Affect: Mood normal.         Behavior: Behavior normal.         Thought Content: Thought content normal.         Judgment:  Judgment normal.         Assessment & Plan   Diagnoses and all orders for this visit:    1. Sinus problem (Primary)  -     POCT SARS-CoV-2 Antigen HÉCTOR + Flu    2. Acute non-recurrent frontal sinusitis  -     levoFLOXacin (Levaquin) 750 MG tablet; Take 1 tablet by mouth Daily.  Dispense: 7 tablet; Refill: 0       Flu and COVID are negative  Treat sinusitis with Levaquin due to patient's extensive allergy list

## 2024-08-02 ENCOUNTER — HOSPITAL ENCOUNTER (OUTPATIENT)
Dept: INFUSION THERAPY | Facility: HOSPITAL | Age: 69
Discharge: HOME | End: 2024-08-02
Payer: MEDICARE

## 2024-08-02 VITALS
HEIGHT: 64 IN | DIASTOLIC BLOOD PRESSURE: 83 MMHG | SYSTOLIC BLOOD PRESSURE: 164 MMHG | OXYGEN SATURATION: 94 % | BODY MASS INDEX: 35.34 KG/M2 | RESPIRATION RATE: 18 BRPM | HEART RATE: 81 BPM | WEIGHT: 207 LBS | TEMPERATURE: 97.7 F

## 2024-08-02 DIAGNOSIS — Z95.828 PORT-A-CATH IN PLACE: Primary | ICD-10-CM

## 2024-08-02 DIAGNOSIS — D80.1 COMMON VARIABLE AGAMMAGLOBULINEMIA: ICD-10-CM

## 2024-08-02 LAB
ALBUMIN SERPL-MCNC: 4 G/DL (ref 3.5–5.2)
ALBUMIN/GLOB SERPL: 1.6 G/DL
ALP SERPL-CCNC: 71 U/L (ref 39–117)
ALT SERPL W P-5'-P-CCNC: 20 U/L (ref 1–33)
ANION GAP SERPL CALCULATED.3IONS-SCNC: 11.8 MMOL/L (ref 5–15)
AST SERPL-CCNC: 28 U/L (ref 1–32)
BASOPHILS # BLD AUTO: 0.05 10*3/MM3 (ref 0–0.2)
BASOPHILS NFR BLD AUTO: 1.1 % (ref 0–1.5)
BILIRUB SERPL-MCNC: 0.2 MG/DL (ref 0–1.2)
BUN SERPL-MCNC: 17 MG/DL (ref 8–23)
BUN/CREAT SERPL: 13.8 (ref 7–25)
CALCIUM SPEC-SCNC: 8.6 MG/DL (ref 8.6–10.5)
CHLORIDE SERPL-SCNC: 101 MMOL/L (ref 98–107)
CO2 SERPL-SCNC: 23.2 MMOL/L (ref 22–29)
CREAT SERPL-MCNC: 1.23 MG/DL (ref 0.57–1)
DEPRECATED RDW RBC AUTO: 43.8 FL (ref 37–54)
EGFRCR SERPLBLD CKD-EPI 2021: 48 ML/MIN/1.73
EOSINOPHIL # BLD AUTO: 0.02 10*3/MM3 (ref 0–0.4)
EOSINOPHIL NFR BLD AUTO: 0.5 % (ref 0.3–6.2)
ERYTHROCYTE [DISTWIDTH] IN BLOOD BY AUTOMATED COUNT: 12.5 % (ref 12.3–15.4)
GLOBULIN UR ELPH-MCNC: 2.5 GM/DL
GLUCOSE SERPL-MCNC: 164 MG/DL (ref 65–99)
HCT VFR BLD AUTO: 35.6 % (ref 34–46.6)
HGB BLD-MCNC: 12.1 G/DL (ref 12–15.9)
IGG1 SER-MCNC: 945 MG/DL (ref 700–1600)
IMM GRANULOCYTES # BLD AUTO: 0.02 10*3/MM3 (ref 0–0.05)
IMM GRANULOCYTES NFR BLD AUTO: 0.5 % (ref 0–0.5)
LYMPHOCYTES # BLD AUTO: 0.83 10*3/MM3 (ref 0.7–3.1)
LYMPHOCYTES NFR BLD AUTO: 18.7 % (ref 19.6–45.3)
MCH RBC QN AUTO: 32.3 PG (ref 26.6–33)
MCHC RBC AUTO-ENTMCNC: 34 G/DL (ref 31.5–35.7)
MCV RBC AUTO: 94.9 FL (ref 79–97)
MONOCYTES # BLD AUTO: 0.24 10*3/MM3 (ref 0.1–0.9)
MONOCYTES NFR BLD AUTO: 5.4 % (ref 5–12)
NEUTROPHILS NFR BLD AUTO: 3.28 10*3/MM3 (ref 1.7–7)
NEUTROPHILS NFR BLD AUTO: 73.8 % (ref 42.7–76)
NRBC BLD AUTO-RTO: 0 /100 WBC (ref 0–0.2)
PLATELET # BLD AUTO: 216 10*3/MM3 (ref 140–450)
PMV BLD AUTO: 8.9 FL (ref 6–12)
POTASSIUM SERPL-SCNC: 3.7 MMOL/L (ref 3.5–5.2)
PROT SERPL-MCNC: 6.5 G/DL (ref 6–8.5)
RBC # BLD AUTO: 3.75 10*6/MM3 (ref 3.77–5.28)
SODIUM SERPL-SCNC: 136 MMOL/L (ref 136–145)
WBC NRBC COR # BLD AUTO: 4.44 10*3/MM3 (ref 3.4–10.8)

## 2024-08-02 PROCEDURE — 80053 COMPREHEN METABOLIC PANEL: CPT | Performed by: ALLERGY & IMMUNOLOGY

## 2024-08-02 PROCEDURE — 25010000002 HEPARIN LOCK FLUSH PER 10 UNITS: Performed by: ALLERGY & IMMUNOLOGY

## 2024-08-02 PROCEDURE — 82784 ASSAY IGA/IGD/IGG/IGM EACH: CPT | Performed by: ALLERGY & IMMUNOLOGY

## 2024-08-02 PROCEDURE — 96366 THER/PROPH/DIAG IV INF ADDON: CPT

## 2024-08-02 PROCEDURE — 25010000002 IMMUNE GLOBULIN (HUMAN) 20 GM/200ML SOLUTION: Performed by: ALLERGY & IMMUNOLOGY

## 2024-08-02 PROCEDURE — 85025 COMPLETE CBC W/AUTO DIFF WBC: CPT | Performed by: ALLERGY & IMMUNOLOGY

## 2024-08-02 PROCEDURE — 96365 THER/PROPH/DIAG IV INF INIT: CPT

## 2024-08-02 PROCEDURE — 25810000003 SODIUM CHLORIDE 0.9 % SOLUTION: Performed by: ALLERGY & IMMUNOLOGY

## 2024-08-02 RX ORDER — PREDNISONE 20 MG/1
40 TABLET ORAL ONCE
Status: DISCONTINUED | OUTPATIENT
Start: 2024-08-02 | End: 2024-08-04 | Stop reason: HOSPADM

## 2024-08-02 RX ORDER — SODIUM CHLORIDE 0.9 % (FLUSH) 0.9 %
10 SYRINGE (ML) INJECTION AS NEEDED
Status: DISCONTINUED | OUTPATIENT
Start: 2024-08-02 | End: 2024-08-04 | Stop reason: HOSPADM

## 2024-08-02 RX ORDER — PREDNISONE 20 MG/1
40 TABLET ORAL ONCE AS NEEDED
Start: 2024-08-16

## 2024-08-02 RX ORDER — DIPHENHYDRAMINE HCL 25 MG
50 CAPSULE ORAL ONCE
Start: 2024-08-16 | End: 2024-08-16

## 2024-08-02 RX ORDER — EPINEPHRINE 1 MG/ML
0.3 INJECTION, SOLUTION INTRAMUSCULAR; SUBCUTANEOUS ONCE AS NEEDED
Start: 2024-08-16

## 2024-08-02 RX ORDER — EPINEPHRINE 1 MG/ML
0.3 INJECTION, SOLUTION INTRAMUSCULAR; SUBCUTANEOUS ONCE AS NEEDED
Status: DISCONTINUED | OUTPATIENT
Start: 2024-08-02 | End: 2024-08-04 | Stop reason: HOSPADM

## 2024-08-02 RX ORDER — HEPARIN SODIUM (PORCINE) LOCK FLUSH IV SOLN 100 UNIT/ML 100 UNIT/ML
500 SOLUTION INTRAVENOUS AS NEEDED
OUTPATIENT
Start: 2024-08-02

## 2024-08-02 RX ORDER — ACETAMINOPHEN 325 MG/1
325 TABLET ORAL ONCE
OUTPATIENT
Start: 2024-08-16

## 2024-08-02 RX ORDER — SODIUM CHLORIDE 0.9 % (FLUSH) 0.9 %
10 SYRINGE (ML) INJECTION AS NEEDED
OUTPATIENT
Start: 2024-08-02

## 2024-08-02 RX ORDER — PREDNISONE 20 MG/1
40 TABLET ORAL ONCE
Start: 2024-08-16 | End: 2024-08-16

## 2024-08-02 RX ORDER — PREDNISONE 20 MG/1
40 TABLET ORAL ONCE AS NEEDED
Status: DISCONTINUED | OUTPATIENT
Start: 2024-08-02 | End: 2024-08-04 | Stop reason: HOSPADM

## 2024-08-02 RX ORDER — DIPHENHYDRAMINE HCL 25 MG
50 CAPSULE ORAL ONCE AS NEEDED
Start: 2024-08-16

## 2024-08-02 RX ORDER — DIPHENHYDRAMINE HCL 25 MG
50 CAPSULE ORAL ONCE AS NEEDED
Status: DISCONTINUED | OUTPATIENT
Start: 2024-08-02 | End: 2024-08-04 | Stop reason: HOSPADM

## 2024-08-02 RX ORDER — DIPHENHYDRAMINE HCL 25 MG
50 CAPSULE ORAL ONCE
Status: DISCONTINUED | OUTPATIENT
Start: 2024-08-02 | End: 2024-08-04 | Stop reason: HOSPADM

## 2024-08-02 RX ORDER — METHYLPREDNISOLONE SODIUM SUCCINATE 125 MG/2ML
50 INJECTION, POWDER, LYOPHILIZED, FOR SOLUTION INTRAMUSCULAR; INTRAVENOUS ONCE AS NEEDED
Start: 2024-08-16

## 2024-08-02 RX ORDER — ACETAMINOPHEN 325 MG/1
325 TABLET ORAL ONCE
Status: DISCONTINUED | OUTPATIENT
Start: 2024-08-02 | End: 2024-08-04 | Stop reason: HOSPADM

## 2024-08-02 RX ORDER — METHYLPREDNISOLONE SODIUM SUCCINATE 125 MG/2ML
50 INJECTION, POWDER, LYOPHILIZED, FOR SOLUTION INTRAMUSCULAR; INTRAVENOUS ONCE AS NEEDED
Status: DISCONTINUED | OUTPATIENT
Start: 2024-08-02 | End: 2024-08-04 | Stop reason: HOSPADM

## 2024-08-02 RX ORDER — HEPARIN SODIUM (PORCINE) LOCK FLUSH IV SOLN 100 UNIT/ML 100 UNIT/ML
500 SOLUTION INTRAVENOUS AS NEEDED
Status: DISCONTINUED | OUTPATIENT
Start: 2024-08-02 | End: 2024-08-04 | Stop reason: HOSPADM

## 2024-08-02 RX ADMIN — HEPARIN 500 UNITS: 100 SYRINGE at 13:00

## 2024-08-02 RX ADMIN — SODIUM CHLORIDE 500 ML: 9 INJECTION, SOLUTION INTRAVENOUS at 09:09

## 2024-08-02 RX ADMIN — IMMUNE GLOBULIN INFUSION (HUMAN) 20 G: 100 INJECTION, SOLUTION INTRAVENOUS; SUBCUTANEOUS at 09:49

## 2024-08-02 RX ADMIN — Medication 10 ML: at 13:00

## 2024-08-02 RX ADMIN — IMMUNE GLOBULIN INFUSION (HUMAN) 20 G: 100 INJECTION, SOLUTION INTRAVENOUS; SUBCUTANEOUS at 11:41

## 2024-08-02 NOTE — CODE DOCUMENTATION
0900)  Port accessed per sterile technique x1 attempt; + blood return, flushes easily.  Lab specimens collected and to inpatient lab for processing.  Patient tolerated well.

## 2024-08-05 RX ORDER — ERENUMAB-AOOE 140 MG/ML
140 INJECTION, SOLUTION SUBCUTANEOUS
Qty: 1 ML | Refills: 5 | Status: SHIPPED | OUTPATIENT
Start: 2024-08-05 | End: 2024-08-06 | Stop reason: SDUPTHER

## 2024-08-06 ENCOUNTER — OFFICE VISIT (OUTPATIENT)
Dept: INTERNAL MEDICINE | Facility: CLINIC | Age: 69
End: 2024-08-06
Payer: MEDICARE

## 2024-08-06 VITALS
HEART RATE: 77 BPM | SYSTOLIC BLOOD PRESSURE: 162 MMHG | OXYGEN SATURATION: 95 % | WEIGHT: 209 LBS | DIASTOLIC BLOOD PRESSURE: 96 MMHG | RESPIRATION RATE: 16 BRPM | TEMPERATURE: 97.6 F | BODY MASS INDEX: 35.68 KG/M2 | HEIGHT: 64 IN

## 2024-08-06 DIAGNOSIS — Z79.4 TYPE 2 DIABETES MELLITUS WITHOUT COMPLICATION, WITH LONG-TERM CURRENT USE OF INSULIN: ICD-10-CM

## 2024-08-06 DIAGNOSIS — E11.9 TYPE 2 DIABETES MELLITUS WITHOUT COMPLICATION, WITH LONG-TERM CURRENT USE OF INSULIN: ICD-10-CM

## 2024-08-06 DIAGNOSIS — J32.0 MAXILLARY SINUSITIS, UNSPECIFIED CHRONICITY: ICD-10-CM

## 2024-08-06 DIAGNOSIS — E79.0 HYPERURICEMIA: ICD-10-CM

## 2024-08-06 DIAGNOSIS — K27.9 H PYLORI ULCER: ICD-10-CM

## 2024-08-06 DIAGNOSIS — L98.9 SKIN LESION: ICD-10-CM

## 2024-08-06 DIAGNOSIS — B96.81 H PYLORI ULCER: ICD-10-CM

## 2024-08-06 DIAGNOSIS — R10.13 EPIGASTRIC ABDOMINAL PAIN: Primary | ICD-10-CM

## 2024-08-06 PROCEDURE — 1125F AMNT PAIN NOTED PAIN PRSNT: CPT | Performed by: INTERNAL MEDICINE

## 2024-08-06 PROCEDURE — 3077F SYST BP >= 140 MM HG: CPT | Performed by: INTERNAL MEDICINE

## 2024-08-06 PROCEDURE — 99214 OFFICE O/P EST MOD 30 MIN: CPT | Performed by: INTERNAL MEDICINE

## 2024-08-06 PROCEDURE — 3044F HG A1C LEVEL LT 7.0%: CPT | Performed by: INTERNAL MEDICINE

## 2024-08-06 PROCEDURE — G2211 COMPLEX E/M VISIT ADD ON: HCPCS | Performed by: INTERNAL MEDICINE

## 2024-08-06 PROCEDURE — 3080F DIAST BP >= 90 MM HG: CPT | Performed by: INTERNAL MEDICINE

## 2024-08-06 RX ORDER — ERENUMAB-AOOE 140 MG/ML
140 INJECTION, SOLUTION SUBCUTANEOUS
Qty: 1 ML | Refills: 5 | Status: SHIPPED | OUTPATIENT
Start: 2024-08-06

## 2024-08-06 NOTE — ADDENDUM NOTE
Latrice Diamond  [address] [facility] [id number]    Dr. Branden Levine  [facility] [place] [address]  ** Report to GI Services Building, Entrance F 1st Floor, Behind [facility]  ** Please call [phone] with any questions    Please follow these instructions. Disregard instructions that are provided on the medication package.    Date of Procedure: Wednesday 10/12/2022   Check in at: 1:15 PM    Procedure at: 2:15 PM       Your laxative prescription (MoviPrep) may be picked up from:  [facility].  At the time your procedure is scheduled the prescription has been sent to your pharmacy of choice. If your prescription is not picked up within 1 week, the prescription will be placed on file at the pharmacy.  Please, ask the pharmacy to prepare medication that is on file prior to calling the clinic.    Colonoscopy Preparation Instructions  ?  **Please make arrangements for a responsible adult to drive you home. (A responsible adult is someone age 18 or older who can receive and understand instructions, stay with you, and call for assistance as instructed).**    Regarding Your Medications Prior to Your Procedure:  • Do not take any iron or iron-containing multivitamins beginning five days prior to your procedure.    • Do not take Phentermine for 10 days prior to your procedure.  • Blood-thinners typically need to be stopped a few days prior to the procedure. Check with your prescribing physician if you take blood thinners such as Coumadin (warfarin), Pradaxa, Plavix, Eliquis, Xarelto or Brilinta.  • Do not take aspirin or anti-inflammatory medications (Advil, Motrin, ibuprofen, Aleve, naproxen), in the morning on the day of your procedure.  • If you are diabetic:  o Take half of your usual dose of insulin the night before. DO NOT take insulin the morning of the procedure.  o Hold oral diabetic medications the night before and the morning of the procedure.     The Day Before the  Addended by: DEBI STANLEY on: 8/6/2024 01:12 PM     Modules accepted: Orders     Procedure:  • Start a clear liquid diet at breakfast. Continue a clear liquid diet for the entire day in unlimited amounts. Clear liquids are listed below.   • In the morning empty one packet A and one packet B into the bottle.  Add tap water to the fill line.  Cover tightly, shake, and refrigerate.  • At approximately 5:00 PM (or when you are home for the evening) drink one 8 ounce glass of laxative every 15 minutes until gone.    • Mix the second bottle of Moviprep and place in the refrigerator.   • After completing the bottle, drink 16 ounces of water.   • Walking will help the laxative move through the colon.     The Day of the Procedure:  • At 10:15 AM,  Start drinking the second bottle of Moviprep. Drink one 8 ounce glass of laxative every 15 minutes until gone.    • After completing the second bottle, drink 16 ounces of water.  • You may take your morning medications with a sip of water, unless otherwise directed.   • Do not have anything by mouth after 12:15 PM.     Clear Liquid Diet:  Do not consume anything red or purple.    Beverages: soft drinks/soda, Gatorade or Kirill-Aid, clear fruit juices without pulp, water, tea, coffee (no milk or non dairy creamer).   Broths: chicken, beef or vegetable.   Desserts: hard candies, Jell-O, Popsicles. (No fruit bars or sherbet)    If stools are not clear yellow the morning of the procedure, please call the GI lab at [phone].                                                 About Your Colonoscopy  What is colonoscopy?    Colonoscopy is a procedure that allows your doctor to clearly see the lining of your colon (large bowel). A flexible tube, about the thickness of your finger, is put into the rectum and moved slowly through the entire colon. A special camera in the tube allows the doctor to see any problem areas in the colon.    Why is a colonoscopy needed?    A colonoscopy can be done:     1.   As a screening test for colon cancer. The American Cancer Society  recommends colon screening for every adult starting at age 50, sometimes earlier, if you have a family history of colon cancer or polyps.   Ask your doctor when you should be screened.     2.  To find out what is causing symptoms such as rectal bleeding or changes in bowel habits (X-rays alone may not find the problem).    3.  As a regular follow-up exam for patients with previous polyps, colon cancer, or a family history of colon cancer.     How do I prepare for the colonoscopy?    For the best possible exam, the colon must be completely empty. Your doctor will give you detailed instructions for a cleansing routine and diet to follow. Or, you will be told what time to arrive at the hospital to begin the cleansing routine before your colonoscopy.    Can I take my medicines?    Most medicines can be taken as usual, but some can interfere with the preparation or the exam. Please talk with your doctor at least a week before the exam. Ask about taking your medicines, especially if you take aspirin products, anticoagulants (blood thinners), arthritis or blood pressure medicines, insulin or iron products.    What happens during the colonoscopy?    Your doctor will give you medicine through a vein in your arm to help you relax and stay comfortable during the exam. You will lie on your side or on your back while the flexible tube is moved slowly through the large bowel. As the tube is slowly withdrawn, the doctor looks at the lining of the large bowel. You may feel some cramping or gas but the medicine should keep you comfortable. The exam usually takes about 20 to 30 minutes.      What is a polypectomy?    Sometimes polyps are found during a colonoscopy.     Polyps are abnormal growths of tissue found on the colon lining. If your doctor thinks a polyp should be removed, a small wire loop or snare will be passed through the tube and the polyp will be cut out.  You will not feel this. Most polyps are benign (not cancer). But  some may contain an area of cancer or may develop into cancer.     Removal of colon polyps is an important way to prevent colon cancer. People who have had a history of polyps may need to have follow-up colonoscopies to check if new polyps have formed. These follow-up exams usually occur in one to five years of your first exam (your doctor will tell you when you need to schedule another exam). Some people who have large polyps or cancerous polyps may need to have surgery. Your doctor will educate and prepare you for this step, if needed.    What happens after the colonoscopy?    • Your doctor will explain the results to you.   • You may have some cramping or bloating because of the air put into the colon during the exam. This should go away quickly with passage of gas.   • Generally, you should be able to eat and drink as usual after the exam.  •   If you were given medicines to help you relax during the exam, someone must take you home.  For your own safety, please have a friend or family member drive you. If you do not have a ride home, your procedure may need to be rescheduled.    Going home    •  You should not drive or operate any machinery for 24 hours. Even if you feel alert, your judgment and reflexes may be slower from the sedative medicine.  •  Do not make legally binding decisions for the next 24 hours.  •  Do not drink alcohol for the next 24 hours.      Are there complications of colonoscopy?    Complications after a colonoscopy are rare, but can occur.                                 What are the risks of colonoscopy?    While this is a relatively safe and routine procedure, there are risks associated with it.  The average risk of potential complications is reported to be less than 1%.  Please note this percent is NOT zero.  Complications can and do occur.    These can include, but are not limited to:    · Perforation:  A tear or hole in the colon.  Average risk is generally less than 0.1%.  Emergency  surgery may be required to repair this.  · Bleeding after the removal of a polyp can occur, generally less than 0.5% risk.  This will often stop on its own, but may require blood transfusion, repeat colonoscopy, or surgery.  · Risk of infection or injury to internal organs is extremely low.  · Complications from the sedation, which can include (and are not limited to): breathing or blood pressure problems, pneumonia infection, heart problems or heart attack, stroke, etc.  · While there are risks as mentioned above, having a colonoscopy to identify and remove polyps can prevent up to 90% of colorectal cancers.    When should I call the doctor?    Although complications after colonoscopy are not common, it is important to know the early signs of any possible complication. Call the doctor who performed your colonoscopy if you notice:    • Severe abdominal pain  • Fever and chills  • Bloody bowel movement; bleeding can occur several days after polyp removal  • Distended and hard abdomen  • If you have any questions or concerns    Need more Information?    Please talk with your doctor or the office staff if you have questions about the colonoscopy.  For any questions regarding cost, billing and insurance coverage, please call [phone].  If you have questions that have not been answered, please discuss them with the nurse or doctor before the exam begins.                            About Your EGD    What is an EGD?    EGD stands for “esophagogastroduodenoscopy.”  It is a test in which your doctor can look closely at the upper part of your digestive tract:  · Esophagus (tube that sends food from mouth to stomach)  · Stomach  · Duodenum (first part of the small bowel)    An EGD is done using a long, thin flexible tube called an endoscope.  So, this test is also known as “upper endoscopy.”     Why is an EGD needed?    An EGD lets your doctor see the lining of your esophagus, stomach and duodenum.  This can be done for  several reasons:    · It can confirm an abnormal area that was seen on an X-ray.   · It can find abnormal areas too small to be seen on an X-ray.   · The doctor can take a biopsy (small piece of tissue) of any areas through the endoscope.  If a biopsy is done, the tissue is sent to the lab to be studied.  (Biopsies are done for many reasons and do not always mean cancer.)   · Other instruments can be passed through the endoscope without causing pain.  · A small brush can be used to wipe cells from an abnormal area. The cells are then sent to the lab to be studied.  · A tube or balloon can be used to open up a narrow area.  · Bleeding may be controlled by cautery (use of heat) or placement of clips.    Before your EGD    Please plan to have someone take you home.  You cannot drive after your test because the medicine given will make you drowsy.  You may not feel tired, but your judgment and reflexes may be slower than normal.      Do not eat anything 8 hours prior to your test (or whatever time your doctor tells you).  You may drink clear liquids only (water, black coffee, apple juice, Gatorade) until 4 hours before your procedure time.  Then nothing by mouth until after your test.  Your stomach must be completely empty.      Do not take aspirin, aspirin products or arthritis medicines on the day of your test.  If you take a blood thinner or diabetes medicine, ask your doctor whether you should take these.  Tell your doctor and nurse if you are allergic to any drugs before coming for the test.    During your EGD    · The back of your throat will be numbed by medication that may be sprayed or gargled.  · The doctor will give you medicine to relax you before the endoscope is passed.  · While you are in a comfortable position, the endoscope will be passed through your mouth.  Each part of the esophagus, stomach and duodenum will be studied.  This usually takes less than 10 minutes.  · You should feel little or no  discomfort.  Most patients are asleep.  The tube will not interfere with your breathing.  The medicine should prevent you from gagging.    Going home    · You should not drive or operate machinery for 24 hours.  Even if you feel alert, your judgement and reflexes may be slower from the sedative medicine.  · Do not make legally binding decisions for the next 24 hours.  · Do not drink alcohol for the next 24 hours.  · Your throat may be a little sore.  Using lozenges or gargling with warm salt water can help the discomfort.  · You may eat and drink as usual unless your doctor tells you to change your diet.  · Call your doctor if you have chest pain, trouble swallowing, fever over 101°F, stomach pain, trouble breathing, you vomit blood or coffeeground-like material, or your bowel movement is black or bloody.                                                      INSURANCE COVERAGE REGARDING PAYMENT  FOR YOUR COLONOSCOPY    **To obtain a pre-service estimate of your procedure - call [phone] to reach the **    Colon Cancer is the second leading cause of death among cancers, per the American Cancer Society.  It is preventable.  Early detection is the key.  Your doctor will determine which tests need to be done for prevention and/or treatment.    If during the course of a screening colonoscopy, our physician finds an abnormality, performs a biopsy or polypectomy (removal of polyp), your insurance company most likely will consider the procedure to be a diagnostic exam and no longer a screening procedure.    Every insurance company is different.  We encourage you to call your insurance company and ask them \"if during the course of a screening colonoscopy, an abnormality is discovered and the physician performs a biopsy or polypectomy, will the procedure fall under your screening benefits or under diagnostic benefits\".  Generally, screening benefits and diagnostic benefits are paid at different levels.   This varies with each insurance company, so we want you to be aware of this prior to your procedure.  You do not have to call your insurance company if you have Medicare.    The authorization staff at [facility] will precertify your colonoscopy.  However, precertification, which serves as a notification is never a guarantee of payment.  If you have questions regarding precertification for your procedure please contact your insurance company.                                   Dr. Branden Levine    7/13/2022      Latrice Diamond  [address] [place] [id number]                    Dear Ms. Diamond    Please review the enclosed information.    Thank you.                                              Latrice Diamond  [address] [facility] [id number]    07/13/22    If you test positive for covid prior to your procedure, please let the clinic know for further instructions.    Date of COVID 19 Test:  10/10/2022    Time:  1:50 PM  (Please arrive 5 minutes prior to your scheduled test time)    Testing will be at the facility lab locations.  Location:  [facility], 1st Floor, [address], [address] [place], [place]     Please follow the below instructions until after surgery is completed:     · Do NOT travel out of home area (city/town)  · Self-quarantine is recommended and minimizes your risk of exposure before your procedure.  If you are unable to self-quarantine please continue to follow the below guidelines.  ·  Immediately report any new symptoms or suspected/known exposures to COVID-19 cases to [phone].  · Maintain physical distancing (at least 6 feet) at all times both at home and away from home  · Wear a mask while you are outside the home.  · Wash hands frequently and thoroughly with soap and water (lather at least 20 seconds) or disinfect with alcohol-based hand  before eating, before touching face/mouth/eyes, after touching shared objects or high  touch surfaces.   · Regularly clean cell phones, door handles, light switches, faucet and toilet handles, bathrooms, and kitchen surfaces using household disinfectant or hot soapy water.

## 2024-08-06 NOTE — PROGRESS NOTES
Subjective     Patient ID: Raquel Mariee is a 68 y.o. female. Patient is here for management of multiple medical problems.     Chief Complaint   Patient presents with    Joint Pain     Last 2 weeks have noticed a lot of joint pain    Gas    Heartburn     History of Present Illness   AK left leg.    Lesion on face.      Gastritis and gas bloating. No diarrhea. Using flavoring in water.  Stopped linzess.   Waxes and wanes. Told she had deodintis in past.  Duration x 2 weeks.  Stomach to back pain. Mid epigastric.   Hx of h pylori in past.       Was on Levaquin        Joint pain. Worse overall.  Waxed and waned.    Has apt with Dr Sharpe      The following portions of the patient's history were reviewed and updated as appropriate: allergies, current medications, past family history, past medical history, past social history, past surgical history and problem list.    Review of Systems    Current Outpatient Medications:     acetaminophen-codeine (TYLENOL with CODEINE #3) 300-30 MG per tablet, Take 1 tablet by mouth Every 4 (Four) Hours As Needed for Moderate Pain., Disp: 30 tablet, Rfl: 0    albuterol sulfate  (90 Base) MCG/ACT inhaler, Inhale 2 puffs by mouth every 4 to 6 hours as needed for cough, wheeze, shortness of breath. Use with vortex spacer, Disp: 8.5 g, Rfl: 3    allopurinol (ZYLOPRIM) 100 MG tablet, Take 1 tablet by mouth Daily., Disp: 90 tablet, Rfl: 3    betamethasone valerate (VALISONE) 0.1 % cream, Apply topically to the appropriate area as directed Daily. (Patient taking differently: Apply  topically to the appropriate area as directed Daily As Needed.), Disp: 45 g, Rfl: 11    Budeson-Glycopyrrol-Formoterol (Breztri Aerosphere) 160-9-4.8 MCG/ACT aerosol inhaler, Inhale 2 puffs by mouth twice daily. Rinse mouth after use, Disp: 10.7 g, Rfl: 11    buPROPion SR (WELLBUTRIN SR) 150 MG 12 hr tablet, Take 1 tablet by mouth 2 (Two) Times a Day., Disp: 180 tablet, Rfl: 3    calcium carbonate  (Antacid Maximum) 1000 MG chewable tablet, Chew 500 mg 3 (Three) Times a Day., Disp: , Rfl:     cholecalciferol (VITAMIN D3) 25 MCG (1000 UT) tablet, Take 1 tablet by mouth Daily., Disp: , Rfl:     cloNIDine (Catapres) 0.1 MG tablet, Take 1 tablet by mouth 2 (Two) Times a Day., Disp: 30 tablet, Rfl: 5    Dextromethorphan-guaiFENesin (MUCINEX DM PO), Take 2 tablets by mouth 2 (Two) Times a Day., Disp: , Rfl:     diphenhydrAMINE (Benadryl Allergy) 25 MG tablet, Take 1-2 tablets by mouth Every 4-6 Hours As Needed., Disp: 90 tablet, Rfl: 11    Erenumab-aooe (Aimovig) 140 MG/ML auto-injector, Inject 1 mL under the skin into the appropriate area as directed Every 30 (Thirty) Days., Disp: 1 mL, Rfl: 5    estradiol (ESTRACE) 0.1 MG/GM vaginal cream, Insert 0.5 grams vaginally twice weekly, Disp: 42.5 g, Rfl: 5    ferrous sulfate 325 (65 FE) MG EC tablet, Take 2 tablets by mouth Daily With Breakfast., Disp: , Rfl:     fexofenadine (ALLEGRA) 180 MG tablet, Take 1 tablet by mouth Daily., Disp: , Rfl:     fluticasone (FLONASE) 50 MCG/ACT nasal spray, Instill 1 spray into each nostril as directed by provider Daily., Disp: 16 g, Rfl: 1    fluticasone (FLONASE) 50 MCG/ACT nasal spray, Instill 1 spray in each nostril twice daily as needed, Disp: 16 g, Rfl: 6    furosemide (LASIX) 40 MG tablet, Take 1 tablet by mouth Daily, twice a month (Patient taking differently: Take 1 tablet by mouth Daily. With infusions), Disp: 30 tablet, Rfl: 1    glucosamine-chondroitin 500-400 MG capsule capsule, Take 1 capsule by mouth 2 (Two) Times a Day With Meals., Disp: , Rfl:     Hypertonic Nasal Wash (Sinus Rinse Kit) pack, use once or twice daily as needed, Disp: 1 each, Rfl: 3    immune globulin, human, (Gammagard) 20 GM/200ML solution infusion, Infuse 40 g into a venous catheter Every 28 (Twenty-Eight) Days., Disp: 400 mL, Rfl: 0    immune globulin, human, (Gammagard) 30 GM/300ML solution infusion, 40 grams IV every 4 weeks, Disp: 40 mL, Rfl:  11    ipratropium (ATROVENT) 0.06 % nasal spray, Instill 1-2 sprays each nostril 2-3 times daily as needed, Disp: 15 mL, Rfl: 3    ipratropium-albuterol (DUO-NEB) 0.5-2.5 mg/3 ml nebulizer, Inhale the contents of 1 vial through a nebulizer every 4-6 hours as needed for cough,wheezing and shortness of breath., Disp: 90 mL, Rfl: 3    isosorbide mononitrate (IMDUR) 30 MG 24 hr tablet, Take 1 tablet by mouth Every Morning., Disp: 90 tablet, Rfl: 3    levothyroxine (SYNTHROID, LEVOTHROID) 50 MCG tablet, Take 1 tablet by mouth Daily., Disp: 90 tablet, Rfl: 3    linaclotide (Linzess) 145 MCG capsule capsule, Take 1 capsule by mouth Every Morning Before Breakfast., Disp: 30 capsule, Rfl: 11    Magnesium 250 MG tablet, Take 2 tablets by mouth 2 (Two) Times a Day., Disp: , Rfl:     Mepolizumab (Nucala) 100 MG/ML solution auto-injector, Inject 1 mL under the skin into the appropriate area as directed Every 28 (Twenty-Eight) Days. Obtaining medication from [facility] [facility], Disp: , Rfl:     montelukast (SINGULAIR) 10 MG tablet, Take 1 tablet by mouth every night at bedtime., Disp: 90 tablet, Rfl: 3    montelukast (SINGULAIR) 10 MG tablet, Take 1 tablet by mouth every night at bedtime., Disp: 30 tablet, Rfl: 11    multivitamin with minerals tablet tablet, Take 1 tablet by mouth Daily., Disp: , Rfl:     omeprazole (priLOSEC) 40 MG capsule, Take 1 capsule by mouth 2 (Two) Times a Day., Disp: 180 capsule, Rfl: 3    ondansetron (ZOFRAN) 4 MG tablet, Take 1 tablet by mouth Every 8 (Eight) Hours As Needed for Nausea or Vomiting., Disp: 30 tablet, Rfl: 11    POTASSIUM CHLORIDE PO, Take 20 mEq by mouth Take As Directed. Takes two times a month with Lasix, Disp: , Rfl:     potassium citrate (UROCIT-K) 10 MEQ (1080 MG) CR tablet, Take 1 tablet by mouth Daily., Disp: 90 tablet, Rfl: 3    tiZANidine (ZANAFLEX) 4 MG tablet, Take 1 tablet by mouth 2 (Two) Times a Day As Needed for Muscle Spasms., Disp: 100 tablet, Rfl: 3    ubrogepant  "(Ubrelvy) 100 MG tablet, Take 1 tablet by mouth 1 (One) Time As Needed (migraine)., Disp: 8 tablet, Rfl: 0    valsartan (DIOVAN) 160 MG tablet, Take 1 tablet by mouth Daily., Disp: 90 tablet, Rfl: 3    vitamin B-12 (CYANOCOBALAMIN) 1000 MCG tablet, Take 1 tablet by mouth Daily., Disp: , Rfl:     Objective      Blood pressure 162/96, pulse 77, temperature 97.6 °F (36.4 °C), resp. rate 16, height 162.6 cm (64\"), weight 94.8 kg (209 lb), SpO2 95%, not currently breastfeeding.            Physical Exam     General Appearance:    Alert, cooperative, no distress, appears stated age   Head:    Normocephalic, without obvious abnormality, atraumatic   Eyes:    PERRL, conjunctiva/corneas clear, EOM's intact   Ears:    Normal TM's and external ear canals, both ears   Nose:   Nares normal, septum midline, mucosa normal, no drainage   or sinus tenderness   Throat:   Lips, mucosa, and tongue normal; teeth and gums normal   Neck:   Supple, symmetrical, trachea midline, no adenopathy;        thyroid:  No enlargement/tenderness/nodules; no carotid    bruit or JVD   Back:     Symmetric, no curvature, ROM normal, no CVA tenderness   Lungs:     Clear to auscultation bilaterally, respirations unlabored   Chest wall:    No tenderness or deformity   Heart:    Regular rate and rhythm, S1 and S2 normal, no murmur,        rub or gallop   Abdomen:     Soft, non-tender, bowel sounds active all four quadrants,     no masses, no organomegaly   Extremities:   Extremities normal, atraumatic, no cyanosis or edema   Pulses:   2+ and symmetric all extremities   Skin:   Skin color, texture, turgor normal, no rashes or lesions   Lymph nodes:   Cervical, supraclavicular, and axillary nodes normal   Neurologic:   CNII-XII intact. Normal strength, sensation and reflexes       throughout      Results for orders placed or performed during the hospital encounter of 08/02/24   IgG    Specimen: Blood   Result Value Ref Range    IgG 945 700 - 1,600 mg/dL "   Comprehensive Metabolic Panel    Specimen: Blood   Result Value Ref Range    Glucose 164 (H) 65 - 99 mg/dL    BUN 17 8 - 23 mg/dL    Creatinine 1.23 (H) 0.57 - 1.00 mg/dL    Sodium 136 136 - 145 mmol/L    Potassium 3.7 3.5 - 5.2 mmol/L    Chloride 101 98 - 107 mmol/L    CO2 23.2 22.0 - 29.0 mmol/L    Calcium 8.6 8.6 - 10.5 mg/dL    Total Protein 6.5 6.0 - 8.5 g/dL    Albumin 4.0 3.5 - 5.2 g/dL    ALT (SGPT) 20 1 - 33 U/L    AST (SGOT) 28 1 - 32 U/L    Alkaline Phosphatase 71 39 - 117 U/L    Total Bilirubin 0.2 0.0 - 1.2 mg/dL    Globulin 2.5 gm/dL    A/G Ratio 1.6 g/dL    BUN/Creatinine Ratio 13.8 7.0 - 25.0    Anion Gap 11.8 5.0 - 15.0 mmol/L    eGFR 48.0 (L) >60.0 mL/min/1.73   CBC Auto Differential    Specimen: Blood   Result Value Ref Range    WBC 4.44 3.40 - 10.80 10*3/mm3    RBC 3.75 (L) 3.77 - 5.28 10*6/mm3    Hemoglobin 12.1 12.0 - 15.9 g/dL    Hematocrit 35.6 34.0 - 46.6 %    MCV 94.9 79.0 - 97.0 fL    MCH 32.3 26.6 - 33.0 pg    MCHC 34.0 31.5 - 35.7 g/dL    RDW 12.5 12.3 - 15.4 %    RDW-SD 43.8 37.0 - 54.0 fl    MPV 8.9 6.0 - 12.0 fL    Platelets 216 140 - 450 10*3/mm3    Neutrophil % 73.8 42.7 - 76.0 %    Lymphocyte % 18.7 (L) 19.6 - 45.3 %    Monocyte % 5.4 5.0 - 12.0 %    Eosinophil % 0.5 0.3 - 6.2 %    Basophil % 1.1 0.0 - 1.5 %    Immature Grans % 0.5 0.0 - 0.5 %    Neutrophils, Absolute 3.28 1.70 - 7.00 10*3/mm3    Lymphocytes, Absolute 0.83 0.70 - 3.10 10*3/mm3    Monocytes, Absolute 0.24 0.10 - 0.90 10*3/mm3    Eosinophils, Absolute 0.02 0.00 - 0.40 10*3/mm3    Basophils, Absolute 0.05 0.00 - 0.20 10*3/mm3    Immature Grans, Absolute 0.02 0.00 - 0.05 10*3/mm3    nRBC 0.0 0.0 - 0.2 /100 WBC     *Note: Due to a large number of results and/or encounters for the requested time period, some results have not been displayed. A complete set of results can be found in Results Review.         Assessment & Plan     AK left leg.    Lesion on face.      Gastritis and gas bloating. No diarrhea. Using flavoring  in water.  Stopped linzess.   Waxes and wanes. Told she had deodintis in past.  Duration x 2 weeks.  Stomach to back pain. Mid epigastric.   Hx of h pylori in past.       Was on Levaquin. Just stopped. May be cause to gi issues.            Joint pain. Worse overall.  Waxed and waned.    Has apt with Dr Sharpe    Diagnoses and all orders for this visit:    1. Epigastric abdominal pain (Primary)  -     Comprehensive Metabolic Panel  -     CBC & Differential  -     Vitamin B12  -     Lipase  -     Pancreatic Elastase, Fecal - Stool, Per Rectum    2. H pylori ulcer  -     H. Pylori Breath Test - Breath, Lung    3. Maxillary sinusitis, unspecified chronicity  -     ubrogepant (Ubrelvy) 100 MG tablet; Take 1 tablet by mouth 1 (One) Time As Needed (migraine).  Dispense: 8 tablet; Refill: 0    4. Type 2 diabetes mellitus without complication, with long-term current use of insulin  -     ubrogepant (Ubrelvy) 100 MG tablet; Take 1 tablet by mouth 1 (One) Time As Needed (migraine).  Dispense: 8 tablet; Refill: 0  -     Hemoglobin A1c    5. Hyperuricemia  -     Uric acid    6. Skin lesion  -     Ambulatory Referral to Dermatology    Other orders  -     Erenumab-aooe (Aimovig) 140 MG/ML auto-injector; Inject 1 mL under the skin into the appropriate area as directed Every 30 (Thirty) Days.  Dispense: 1 mL; Refill: 5      Return in about 6 months (around 2/6/2025) for Medicare Wellness.          There are no Patient Instructions on file for this visit.     Sanjeev Rawls MD    Assessment & Plan

## 2024-08-07 DIAGNOSIS — J32.0 MAXILLARY SINUSITIS, UNSPECIFIED CHRONICITY: ICD-10-CM

## 2024-08-07 DIAGNOSIS — E11.9 TYPE 2 DIABETES MELLITUS WITHOUT COMPLICATION, WITH LONG-TERM CURRENT USE OF INSULIN: ICD-10-CM

## 2024-08-07 DIAGNOSIS — Z79.4 TYPE 2 DIABETES MELLITUS WITHOUT COMPLICATION, WITH LONG-TERM CURRENT USE OF INSULIN: ICD-10-CM

## 2024-08-07 LAB
ALBUMIN SERPL-MCNC: 4.3 G/DL (ref 3.5–5.2)
ALBUMIN/GLOB SERPL: 1.9 G/DL
ALP SERPL-CCNC: 69 U/L (ref 39–117)
ALT SERPL-CCNC: 17 U/L (ref 1–33)
AST SERPL-CCNC: 28 U/L (ref 1–32)
BASOPHILS # BLD AUTO: 0.03 10*3/MM3 (ref 0–0.2)
BASOPHILS NFR BLD AUTO: 0.7 % (ref 0–1.5)
BILIRUB SERPL-MCNC: 0.4 MG/DL (ref 0–1.2)
BUN SERPL-MCNC: 16 MG/DL (ref 8–23)
BUN/CREAT SERPL: 13.9 (ref 7–25)
CALCIUM SERPL-MCNC: 9.6 MG/DL (ref 8.6–10.5)
CHLORIDE SERPL-SCNC: 98 MMOL/L (ref 98–107)
CO2 SERPL-SCNC: 27.6 MMOL/L (ref 22–29)
CREAT SERPL-MCNC: 1.15 MG/DL (ref 0.57–1)
EGFRCR SERPLBLD CKD-EPI 2021: 52 ML/MIN/1.73
EOSINOPHIL # BLD AUTO: 0.03 10*3/MM3 (ref 0–0.4)
EOSINOPHIL NFR BLD AUTO: 0.7 % (ref 0.3–6.2)
ERYTHROCYTE [DISTWIDTH] IN BLOOD BY AUTOMATED COUNT: 12.4 % (ref 12.3–15.4)
GLOBULIN SER CALC-MCNC: 2.3 GM/DL
GLUCOSE SERPL-MCNC: 96 MG/DL (ref 65–99)
HBA1C MFR BLD: 6.3 % (ref 4.8–5.6)
HCT VFR BLD AUTO: 38.7 % (ref 34–46.6)
HGB BLD-MCNC: 13 G/DL (ref 12–15.9)
IMM GRANULOCYTES # BLD AUTO: 0.02 10*3/MM3 (ref 0–0.05)
IMM GRANULOCYTES NFR BLD AUTO: 0.5 % (ref 0–0.5)
LIPASE SERPL-CCNC: 23 U/L (ref 13–60)
LYMPHOCYTES # BLD AUTO: 1.38 10*3/MM3 (ref 0.7–3.1)
LYMPHOCYTES NFR BLD AUTO: 31.5 % (ref 19.6–45.3)
MCH RBC QN AUTO: 32 PG (ref 26.6–33)
MCHC RBC AUTO-ENTMCNC: 33.6 G/DL (ref 31.5–35.7)
MCV RBC AUTO: 95.3 FL (ref 79–97)
MONOCYTES # BLD AUTO: 0.43 10*3/MM3 (ref 0.1–0.9)
MONOCYTES NFR BLD AUTO: 9.8 % (ref 5–12)
NEUTROPHILS # BLD AUTO: 2.49 10*3/MM3 (ref 1.7–7)
NEUTROPHILS NFR BLD AUTO: 56.8 % (ref 42.7–76)
NRBC BLD AUTO-RTO: 0 /100 WBC (ref 0–0.2)
PLATELET # BLD AUTO: 234 10*3/MM3 (ref 140–450)
POTASSIUM SERPL-SCNC: 4.7 MMOL/L (ref 3.5–5.2)
PROT SERPL-MCNC: 6.6 G/DL (ref 6–8.5)
RBC # BLD AUTO: 4.06 10*6/MM3 (ref 3.77–5.28)
SODIUM SERPL-SCNC: 136 MMOL/L (ref 136–145)
URATE SERPL-MCNC: 2.7 MG/DL (ref 2.4–5.7)
UREA BREATH TEST QL: NEGATIVE
VIT B12 SERPL-MCNC: 1046 PG/ML (ref 211–946)
WBC # BLD AUTO: 4.38 10*3/MM3 (ref 3.4–10.8)

## 2024-08-11 LAB — ELASTASE PANC STL-MCNT: 330 UG ELAST./G

## 2024-08-20 NOTE — PROGRESS NOTES
"Subjective   Raquel Mariee is a 68 y.o. female.   Chief Complaint   Patient presents with    Hemorrhoids        History of Present Illness     Ms. Mariee returns to the office today for follow-up evaluation of her hemorrhoids.  She reports a flareup of pain in the rectal region and is concerned that she may have developed a thrombosed hemorrhoid.    The following portions of the patient's history were reviewed and updated as appropriate: allergies, current medications, past family history, past medical history, past social history, past surgical history, and problem list.    Review of Systems   Constitutional:  Negative for diaphoresis, unexpected weight gain and unexpected weight loss.   HENT:  Negative for hearing loss, trouble swallowing and voice change.    Eyes:  Negative for visual disturbance.   Respiratory:  Negative for apnea, cough, chest tightness, shortness of breath and wheezing.    Cardiovascular:  Negative for chest pain, palpitations and leg swelling.   Gastrointestinal:  Positive for anal bleeding and rectal pain. Negative for abdominal distention, abdominal pain, blood in stool, constipation, diarrhea, nausea, vomiting, GERD and indigestion.   Endocrine: Negative for cold intolerance and heat intolerance.   Genitourinary:  Negative for difficulty urinating, dysuria and flank pain.   Musculoskeletal:  Negative for back pain and gait problem.   Skin:  Negative for color change, rash, skin lesions and wound.   Neurological:  Negative for dizziness, syncope, speech difficulty, weakness, light-headedness and numbness.   Hematological:  Negative for adenopathy. Does not bruise/bleed easily.   Psychiatric/Behavioral:  The patient is not nervous/anxious.        Objective   /82   Pulse 79   Temp 97.6 °F (36.4 °C) (Temporal)   Resp 18   Ht 162.6 cm (64\")   Wt 96.6 kg (213 lb)   SpO2 94%   BMI 36.56 kg/m²     Physical Exam  Constitutional:       Appearance: She is well-developed.   HENT:     "  Head: Normocephalic and atraumatic.   Cardiovascular:      Rate and Rhythm: Regular rhythm.   Pulmonary:      Effort: Pulmonary effort is normal.   Genitourinary:     Comments: No evidence of thrombosed external hemorrhoids.  Grade 2 internal hemorrhoid noted in the right anterolateral position which appears inflamed, but nonthrombosed.  Skin:     General: Skin is warm and dry.   Neurological:      Mental Status: She is alert and oriented to person, place, and time.   Psychiatric:         Behavior: Behavior normal.           Assessment & Plan   Diagnoses and all orders for this visit:    1. Internal hemorrhoid, bleeding (Primary)    Other orders  -     lidocaine (XYLOCAINE) 5 % ointment; Apply 1 Application topically to the appropriate area as directed Every 2 (Two) Hours As Needed for Mild Pain.  Dispense: 50 g; Refill: 0  -     Misc. Devices (Sitz Bath) misc; Use 1 each As Needed (for hemorrhoids).  Dispense: 1 each; Refill: 0  -     Hydrocortisone, Perianal, (Anusol-HC) 2.5 % rectal cream; Apply rectally 2 times daily  Dispense: 30 g; Refill: 2        To the patient that I not appreciate any thrombosed hemorrhoids on exam in the office today.  She does have an area where one of the internal hemorrhoids appears to be somewhat inflamed.  I do not appreciate any evidence of perianal or perirectal abscesses or fistulas.  We discussed standard medical management of symptomatic hemorrhoids, and I have prescribed both topical hydrocortisone and lidocaine.  We discussed the use of a sitz bath.  I will plan to see her back in 2 to 3 weeks for reevaluation, sooner if her symptoms fail to improve.

## 2024-08-21 ENCOUNTER — OFFICE VISIT (OUTPATIENT)
Dept: SURGERY | Facility: CLINIC | Age: 69
End: 2024-08-21
Payer: MEDICARE

## 2024-08-21 VITALS
HEIGHT: 64 IN | HEART RATE: 79 BPM | TEMPERATURE: 97.6 F | SYSTOLIC BLOOD PRESSURE: 160 MMHG | BODY MASS INDEX: 36.37 KG/M2 | WEIGHT: 213 LBS | DIASTOLIC BLOOD PRESSURE: 82 MMHG | OXYGEN SATURATION: 94 % | RESPIRATION RATE: 18 BRPM

## 2024-08-21 DIAGNOSIS — K64.8 INTERNAL HEMORRHOID, BLEEDING: Primary | ICD-10-CM

## 2024-08-21 PROCEDURE — 1160F RVW MEDS BY RX/DR IN RCRD: CPT | Performed by: SURGERY

## 2024-08-21 PROCEDURE — 3079F DIAST BP 80-89 MM HG: CPT | Performed by: SURGERY

## 2024-08-21 PROCEDURE — 99213 OFFICE O/P EST LOW 20 MIN: CPT | Performed by: SURGERY

## 2024-08-21 PROCEDURE — 3077F SYST BP >= 140 MM HG: CPT | Performed by: SURGERY

## 2024-08-21 PROCEDURE — 1159F MED LIST DOCD IN RCRD: CPT | Performed by: SURGERY

## 2024-08-21 RX ORDER — HYDROCORTISONE 25 MG/G
CREAM TOPICAL
Qty: 30 G | Refills: 2 | Status: SHIPPED | OUTPATIENT
Start: 2024-08-21

## 2024-08-21 RX ORDER — LIDOCAINE 50 MG/G
1 OINTMENT TOPICAL
Qty: 50 G | Refills: 0 | Status: SHIPPED | OUTPATIENT
Start: 2024-08-21

## 2024-08-21 RX ORDER — FLUTICASONE PROPIONATE 93 UG/1
SPRAY, METERED NASAL
COMMUNITY

## 2024-08-26 DIAGNOSIS — E11.9 TYPE 2 DIABETES MELLITUS WITHOUT COMPLICATION, WITH LONG-TERM CURRENT USE OF INSULIN: ICD-10-CM

## 2024-08-26 DIAGNOSIS — Z79.4 TYPE 2 DIABETES MELLITUS WITHOUT COMPLICATION, WITH LONG-TERM CURRENT USE OF INSULIN: ICD-10-CM

## 2024-08-26 RX ORDER — MONTELUKAST SODIUM 10 MG/1
10 TABLET ORAL
Qty: 90 TABLET | Refills: 3 | Status: SHIPPED | OUTPATIENT
Start: 2024-08-26

## 2024-08-29 RX ORDER — DOXYCYCLINE 100 MG/1
100 CAPSULE ORAL 2 TIMES DAILY
Qty: 20 CAPSULE | Refills: 0 | Status: SHIPPED | OUTPATIENT
Start: 2024-08-29

## 2024-08-30 ENCOUNTER — HOSPITAL ENCOUNTER (OUTPATIENT)
Facility: HOSPITAL | Age: 69
Discharge: HOME | End: 2024-08-30
Payer: MEDICARE

## 2024-08-30 VITALS
HEART RATE: 80 BPM | SYSTOLIC BLOOD PRESSURE: 174 MMHG | DIASTOLIC BLOOD PRESSURE: 86 MMHG | RESPIRATION RATE: 20 BRPM | TEMPERATURE: 98.4 F

## 2024-08-30 DIAGNOSIS — Z95.828 PORT-A-CATH IN PLACE: ICD-10-CM

## 2024-08-30 DIAGNOSIS — D80.1 COMMON VARIABLE AGAMMAGLOBULINEMIA: Primary | ICD-10-CM

## 2024-08-30 DIAGNOSIS — M54.50 CHRONIC BILATERAL LOW BACK PAIN WITHOUT SCIATICA: ICD-10-CM

## 2024-08-30 DIAGNOSIS — G89.29 CHRONIC BILATERAL LOW BACK PAIN WITHOUT SCIATICA: ICD-10-CM

## 2024-08-30 PROCEDURE — 25010000002 HEPARIN LOCK FLUSH PER 10 UNITS: Performed by: ALLERGY & IMMUNOLOGY

## 2024-08-30 PROCEDURE — 96366 THER/PROPH/DIAG IV INF ADDON: CPT

## 2024-08-30 PROCEDURE — 25010000002 IMMUNE GLOBULIN (HUMAN) 20 GM/200ML SOLUTION: Performed by: ALLERGY & IMMUNOLOGY

## 2024-08-30 PROCEDURE — 96361 HYDRATE IV INFUSION ADD-ON: CPT

## 2024-08-30 PROCEDURE — 25810000003 SODIUM CHLORIDE 0.9 % SOLUTION: Performed by: ALLERGY & IMMUNOLOGY

## 2024-08-30 PROCEDURE — 96365 THER/PROPH/DIAG IV INF INIT: CPT

## 2024-08-30 RX ORDER — METHYLPREDNISOLONE SODIUM SUCCINATE 125 MG/2ML
50 INJECTION, POWDER, LYOPHILIZED, FOR SOLUTION INTRAMUSCULAR; INTRAVENOUS ONCE AS NEEDED
Start: 2024-09-27

## 2024-08-30 RX ORDER — ACETAMINOPHEN 325 MG/1
325 TABLET ORAL ONCE
OUTPATIENT
Start: 2024-09-27

## 2024-08-30 RX ORDER — DIPHENHYDRAMINE HCL 25 MG
50 CAPSULE ORAL ONCE
Start: 2024-09-27 | End: 2024-09-27

## 2024-08-30 RX ORDER — GUAIFENESIN 600 MG/1
1200 TABLET, EXTENDED RELEASE ORAL 2 TIMES DAILY
Qty: 60 TABLET | Refills: 4 | Status: SHIPPED | OUTPATIENT
Start: 2024-08-30 | End: 2024-08-30

## 2024-08-30 RX ORDER — ACETAMINOPHEN AND CODEINE PHOSPHATE 300; 30 MG/1; MG/1
1 TABLET ORAL EVERY 4 HOURS PRN
Qty: 30 TABLET | Refills: 0 | Status: SHIPPED | OUTPATIENT
Start: 2024-08-30

## 2024-08-30 RX ORDER — DIPHENHYDRAMINE HCL 25 MG
50 CAPSULE ORAL ONCE
Status: DISCONTINUED | OUTPATIENT
Start: 2024-08-30 | End: 2024-08-31 | Stop reason: HOSPADM

## 2024-08-30 RX ORDER — SODIUM CHLORIDE 0.9 % (FLUSH) 0.9 %
10 SYRINGE (ML) INJECTION AS NEEDED
OUTPATIENT
Start: 2024-08-30

## 2024-08-30 RX ORDER — PREDNISONE 20 MG/1
40 TABLET ORAL ONCE
Start: 2024-09-27 | End: 2024-09-27

## 2024-08-30 RX ORDER — ACETAMINOPHEN 325 MG/1
325 TABLET ORAL ONCE
Status: DISCONTINUED | OUTPATIENT
Start: 2024-08-30 | End: 2024-08-31 | Stop reason: HOSPADM

## 2024-08-30 RX ORDER — PREDNISONE 20 MG/1
40 TABLET ORAL ONCE AS NEEDED
Start: 2024-09-27

## 2024-08-30 RX ORDER — SODIUM CHLORIDE 0.9 % (FLUSH) 0.9 %
10 SYRINGE (ML) INJECTION AS NEEDED
Status: DISCONTINUED | OUTPATIENT
Start: 2024-08-30 | End: 2024-08-31 | Stop reason: HOSPADM

## 2024-08-30 RX ORDER — HEPARIN SODIUM (PORCINE) LOCK FLUSH IV SOLN 100 UNIT/ML 100 UNIT/ML
500 SOLUTION INTRAVENOUS AS NEEDED
Status: DISCONTINUED | OUTPATIENT
Start: 2024-08-30 | End: 2024-08-31 | Stop reason: HOSPADM

## 2024-08-30 RX ORDER — PREDNISONE 20 MG/1
40 TABLET ORAL ONCE
Status: DISCONTINUED | OUTPATIENT
Start: 2024-08-30 | End: 2024-08-31 | Stop reason: HOSPADM

## 2024-08-30 RX ORDER — EPINEPHRINE 1 MG/ML
0.3 INJECTION, SOLUTION INTRAMUSCULAR; SUBCUTANEOUS ONCE AS NEEDED
Start: 2024-09-27

## 2024-08-30 RX ORDER — GUAIFENESIN AND DEXTROMETHORPHAN HYDROBROMIDE 600; 30 MG/1; MG/1
2 TABLET, EXTENDED RELEASE ORAL 2 TIMES DAILY
Qty: 60 TABLET | Refills: 1 | Status: SHIPPED | OUTPATIENT
Start: 2024-08-30

## 2024-08-30 RX ORDER — HEPARIN SODIUM (PORCINE) LOCK FLUSH IV SOLN 100 UNIT/ML 100 UNIT/ML
500 SOLUTION INTRAVENOUS AS NEEDED
OUTPATIENT
Start: 2024-08-30

## 2024-08-30 RX ORDER — DIPHENHYDRAMINE HCL 25 MG
50 CAPSULE ORAL ONCE AS NEEDED
Start: 2024-09-27

## 2024-08-30 RX ADMIN — SODIUM CHLORIDE 500 ML: 9 INJECTION, SOLUTION INTRAVENOUS at 09:19

## 2024-08-30 RX ADMIN — IMMUNE GLOBULIN INFUSION (HUMAN) 20 G: 100 INJECTION, SOLUTION INTRAVENOUS; SUBCUTANEOUS at 11:49

## 2024-08-30 RX ADMIN — SODIUM CHLORIDE 500 ML: 9 INJECTION, SOLUTION INTRAVENOUS at 09:18

## 2024-08-30 RX ADMIN — HEPARIN 500 UNITS: 100 SYRINGE at 13:16

## 2024-08-30 RX ADMIN — IMMUNE GLOBULIN INFUSION (HUMAN) 20 G: 100 INJECTION, SOLUTION INTRAVENOUS; SUBCUTANEOUS at 09:52

## 2024-08-30 NOTE — TELEPHONE ENCOUNTER
Rx Refill Note  Requested Prescriptions     Pending Prescriptions Disp Refills    acetaminophen-codeine (TYLENOL with CODEINE #3) 300-30 MG per tablet 30 tablet 0     Sig: Take 1 tablet by mouth Every 4 (Four) Hours As Needed for Moderate Pain.      Last office visit with prescribing clinician: 8/6/2024   Last telemedicine visit with prescribing clinician: Visit date not found   Next office visit with prescribing clinician: 2/7/2025                         Would you like a call back once the refill request has been completed: [] Yes [] No    If the office needs to give you a call back, can they leave a voicemail: [] Yes [] No    Ruth Kruse MA  08/30/24, 10:10 EDT

## 2024-09-02 DIAGNOSIS — F51.01 PRIMARY INSOMNIA: ICD-10-CM

## 2024-09-02 RX ORDER — CLONIDINE HYDROCHLORIDE 0.1 MG/1
0.1 TABLET ORAL 2 TIMES DAILY
Qty: 30 TABLET | Refills: 5 | Status: CANCELLED | OUTPATIENT
Start: 2024-09-02

## 2024-09-03 DIAGNOSIS — F51.01 PRIMARY INSOMNIA: ICD-10-CM

## 2024-09-03 RX ORDER — CLONIDINE HYDROCHLORIDE 0.1 MG/1
0.1 TABLET ORAL 2 TIMES DAILY
Qty: 30 TABLET | Refills: 5 | Status: SHIPPED | OUTPATIENT
Start: 2024-09-03

## 2024-09-05 ENCOUNTER — OFFICE VISIT (OUTPATIENT)
Dept: SURGERY | Facility: CLINIC | Age: 69
End: 2024-09-05
Payer: MEDICARE

## 2024-09-05 VITALS
HEIGHT: 64 IN | BODY MASS INDEX: 36.54 KG/M2 | DIASTOLIC BLOOD PRESSURE: 80 MMHG | TEMPERATURE: 99.6 F | OXYGEN SATURATION: 96 % | HEART RATE: 86 BPM | SYSTOLIC BLOOD PRESSURE: 148 MMHG

## 2024-09-05 DIAGNOSIS — K64.8 INTERNAL HEMORRHOID, BLEEDING: Primary | ICD-10-CM

## 2024-09-05 PROCEDURE — 99212 OFFICE O/P EST SF 10 MIN: CPT | Performed by: SURGERY

## 2024-09-05 PROCEDURE — 3079F DIAST BP 80-89 MM HG: CPT | Performed by: SURGERY

## 2024-09-05 PROCEDURE — 3077F SYST BP >= 140 MM HG: CPT | Performed by: SURGERY

## 2024-09-05 PROCEDURE — 1159F MED LIST DOCD IN RCRD: CPT | Performed by: SURGERY

## 2024-09-05 PROCEDURE — 1160F RVW MEDS BY RX/DR IN RCRD: CPT | Performed by: SURGERY

## 2024-09-13 ENCOUNTER — SPECIALTY PHARMACY (OUTPATIENT)
Dept: PHARMACY | Facility: HOSPITAL | Age: 69
End: 2024-09-13
Payer: MEDICARE

## 2024-09-13 RX ORDER — FLUTICASONE PROPIONATE 50 MCG
SPRAY, SUSPENSION (ML) NASAL
COMMUNITY
Start: 2024-09-13 | End: 2024-09-13 | Stop reason: SDUPTHER

## 2024-09-13 NOTE — PROGRESS NOTES
[facility]  Clinical Reassessment     Raquel Mariee is a 68 y.o. female diagnosed with severe asthma and enrolled in the Ambulatory Care Clinic. A follow-up outreach was conducted, including assessment of continued therapy appropriateness, medication adherence, and side effect incidence and management for Nucala.    Patient reports having significant improvement in asthma symptoms since starting Nucala. She states that she has not used to rescue inhaler at all recently. No issues with adherence and has not missed any doses. She has not had any ADRs and no issues with administering injection. Since last visit, she has started taking Linzess and Cymbalta, otherwise no changes. She also reports compliance with Breztri and has not had any issues with that medication either. No questions/concerns at this time.    Changes to Insurance Coverage or Financial Support  none    Relevant Past Medical History and Comorbidities  Relevant medical history and concomitant health conditions were discussed with the patient. The patient's chart has been reviewed for relevant past medical history and comorbid health conditions and updated as necessary.   Past Medical History:   Diagnosis Date    Anxiety     Aortic valve stenosis     Cardiac murmur     Cataract, bilateral     CHF (congestive heart failure)     Chronic kidney disease     Stage III    Clostridium difficile infection     in her 30s    Common variable immunodeficiency     IVIG infusions    COPD (chronic obstructive pulmonary disease)     Depression     Diabetes mellitus     type II    Eosinophilic asthma     Fibromyalgia, primary     Full dentures     GERD (gastroesophageal reflux disease)     History of transfusion     in [facility] at [facility].  No reaction noted, patient states she does have an antibody from transfusion.    Hypertension     Hypogammaglobulinemia     Hypothyroidism     Irritable bowel syndrome     Migraines     Morbid obesity      "Osteoarthritis     Prinzmetal angina 1990    Pseudomonas infection 1998    lungs    PTSD (post-traumatic stress disorder)     Pulmonary edema     Renal insufficiency     Sinus tachycardia 1990    Sleep apnea     no longer uses/needs cpap    Tachycardia     TIA (transient ischemic attack) 2017    Trochanteric bursitis 01/25/2023     Social History     Socioeconomic History    Marital status:    Tobacco Use    Smoking status: Never     Passive exposure: Never    Smokeless tobacco: Never   Vaping Use    Vaping status: Never Used   Substance and Sexual Activity    Alcohol use: Yes     Comment: ONCE A YEAR    Drug use: Never    Sexual activity: Defer       Allergies  Known allergies and reactions were discussed with the patient. The patient's chart has been reviewed for allergy information and updated as necessary.   Cefaclor, Cephalexin, Cephalosporins, Escitalopram oxalate, Ambien [zolpidem tartrate], Cardizem [diltiazem hcl], Metoclopramide, Mobic [meloxicam], Penicillins, Sumatriptan, Topamax [topiramate], Verapamil, Zolpidem, Amoxicillin, Edecrin [ethacrynic acid], Hydrochlorothiazide, and Sulfa antibiotics    Relevant Laboratory Values  No results for input(s): \"CMP\", \"BMP\", \"CBC\" in the last 72 hours.    Current Medication List  This medication list has been reviewed with the patient and evaluated for any interactions or necessary modifications/recommendations, and updated to include all prescription medications, OTC medications, and supplements the patient is currently taking.  This list reflects what is contained in the patient's profile, which has also been marked as reviewed to communicate to other providers it is the most up to date version of the patient's current medication therapy.     Current Outpatient Medications:     albuterol sulfate  (90 Base) MCG/ACT inhaler, Inhale 2 puffs by mouth every 4 to 6 hours as needed for cough, wheeze, shortness of breath. Use with vortex spacer, Disp: 8.5 " g, Rfl: 3    allopurinol (ZYLOPRIM) 100 MG tablet, Take 1 tablet by mouth Daily., Disp: 90 tablet, Rfl: 3    betamethasone valerate (VALISONE) 0.1 % cream, Apply topically to the appropriate area as directed Daily. (Patient taking differently: Apply  topically to the appropriate area as directed Daily As Needed.), Disp: 45 g, Rfl: 11    Budeson-Glycopyrrol-Formoterol (Breztri Aerosphere) 160-9-4.8 MCG/ACT aerosol inhaler, Inhale 2 puffs by mouth twice daily. Rinse mouth after use, Disp: 10.7 g, Rfl: 11    calcium carbonate (Antacid Maximum) 1000 MG chewable tablet, Chew 500 mg 3 (Three) Times a Day., Disp: , Rfl:     cholecalciferol (VITAMIN D3) 25 MCG (1000 UT) tablet, Take 1 tablet by mouth Daily., Disp: , Rfl:     cloNIDine (Catapres) 0.1 MG tablet, Take 1 tablet by mouth 2 (Two) Times a Day., Disp: 30 tablet, Rfl: 5    diphenhydrAMINE (Benadryl Allergy) 25 MG tablet, Take 1-2 tablets by mouth Every 4-6 Hours As Needed., Disp: 90 tablet, Rfl: 11    DULoxetine (Cymbalta) 30 MG capsule, Take 1 capsule every day by oral route, Disp: 90 capsule, Rfl: 1    estradiol (ESTRACE) 0.1 MG/GM vaginal cream, Insert 0.5 grams vaginally twice weekly, Disp: 42.5 g, Rfl: 5    ferrous sulfate 325 (65 FE) MG EC tablet, Take 2 tablets by mouth Daily With Breakfast., Disp: , Rfl:     fexofenadine (ALLEGRA) 180 MG tablet, Take 1 tablet by mouth Daily., Disp: , Rfl:     fluticasone (FLONASE) 50 MCG/ACT nasal spray, Instill 1 spray into each nostril as directed by provider Daily., Disp: 16 g, Rfl: 1    furosemide (LASIX) 40 MG tablet, Take 1 tablet by mouth Daily, twice a month (Patient taking differently: Take 1 tablet by mouth Daily. With infusions), Disp: 30 tablet, Rfl: 1    glucosamine-chondroitin 500-400 MG capsule capsule, Take 1 capsule by mouth 2 (Two) Times a Day With Meals., Disp: , Rfl:     guaifenesin-dextromethorphan 600-30 mg (MUCINEX DM)  MG tablet sustained-release 12 hour, Take 2 tablets by mouth 2 (Two) Times a  Day., Disp: 60 tablet, Rfl: 1    Hydrocortisone, Perianal, (Anusol-HC) 2.5 % rectal cream, Apply rectally 2 times daily, Disp: 30 g, Rfl: 2    Hypertonic Nasal Wash (Sinus Rinse Kit) pack, use once or twice daily as needed, Disp: 1 each, Rfl: 3    immune globulin, human, (Gammagard) 20 GM/200ML solution infusion, Infuse 40 g into a venous catheter Every 28 (Twenty-Eight) Days., Disp: 400 mL, Rfl: 0    immune globulin, human, (Gammagard) 30 GM/300ML solution infusion, 40 grams IV every 4 weeks, Disp: 40 mL, Rfl: 11    ipratropium (ATROVENT) 0.06 % nasal spray, Instill 1-2 sprays each nostril 2-3 times daily as needed, Disp: 15 mL, Rfl: 3    ipratropium-albuterol (DUO-NEB) 0.5-2.5 mg/3 ml nebulizer, Inhale the contents of 1 vial through a nebulizer every 4-6 hours as needed for cough,wheezing and shortness of breath., Disp: 90 mL, Rfl: 3    isosorbide mononitrate (IMDUR) 30 MG 24 hr tablet, Take 1 tablet by mouth Every Morning., Disp: 90 tablet, Rfl: 3    levothyroxine (SYNTHROID, LEVOTHROID) 50 MCG tablet, Take 1 tablet by mouth Daily., Disp: 90 tablet, Rfl: 3    lidocaine (XYLOCAINE) 5 % ointment, Apply 1 Application topically to the appropriate area as directed Every 2 (Two) Hours As Needed for Mild Pain., Disp: 50 g, Rfl: 0    linaclotide (Linzess) 145 MCG capsule capsule, Take 1 capsule by mouth Every Morning Before Breakfast., Disp: 30 capsule, Rfl: 11    Magnesium 250 MG tablet, Take 2 tablets by mouth 2 (Two) Times a Day., Disp: , Rfl:     Mepolizumab (Nucala) 100 MG/ML solution auto-injector, Inject 1 mL under the skin into the appropriate area as directed Every 28 (Twenty-Eight) Days. Obtaining medication from [place] to [facility] PAP, Disp: , Rfl:     montelukast (SINGULAIR) 10 MG tablet, Take 1 tablet by mouth every night at bedtime., Disp: 90 tablet, Rfl: 3    multivitamin with minerals tablet tablet, Take 1 tablet by mouth Daily., Disp: , Rfl:     omeprazole (priLOSEC) 40 MG capsule, Take 1 capsule by mouth  2 (Two) Times a Day., Disp: 180 capsule, Rfl: 3    ondansetron (ZOFRAN) 4 MG tablet, Take 1 tablet by mouth Every 8 (Eight) Hours As Needed for Nausea or Vomiting., Disp: 30 tablet, Rfl: 11    POTASSIUM CHLORIDE PO, Take 20 mEq by mouth Take As Directed. Takes two times a month with Lasix, Disp: , Rfl:     potassium citrate (UROCIT-K) 10 MEQ (1080 MG) CR tablet, Take 1 tablet by mouth Daily., Disp: 90 tablet, Rfl: 3    tiZANidine (ZANAFLEX) 4 MG tablet, Take 1 tablet by mouth 2 (Two) Times a Day As Needed for Muscle Spasms., Disp: 100 tablet, Rfl: 3    ubrogepant (Ubrelvy) 100 MG tablet, Take 1 tablet by mouth 1 (One) Time As Needed (migraine)., Disp: 8 tablet, Rfl: 11    valsartan (DIOVAN) 160 MG tablet, Take 1 tablet by mouth Daily., Disp: 90 tablet, Rfl: 3    vitamin B-12 (CYANOCOBALAMIN) 1000 MCG tablet, Take 1 tablet by mouth Daily., Disp: , Rfl:     Erenumab-aooe (Aimovig) 140 MG/ML auto-injector, Inject 1 mL under the skin into the appropriate area as directed Every 30 (Thirty) Days. (Patient not taking: Reported on 9/5/2024), Disp: 1 mL, Rfl: 5    fluticasone (FLONASE) 50 MCG/ACT nasal spray, Instill 1 spray in each nostril twice daily as needed, Disp: , Rfl:     Misc. Devices (Sitz Bath) misc, Use 1 each As Needed (for hemorrhoids)., Disp: 1 each, Rfl: 0  No current facility-administered medications for this visit.    Drug Interactions  None noted with Nucala      Adverse Drug Reactions  Adverse Reactions Experienced: none  Plan for ADR Management: N/A     Hospitalizations and Urgent Care Since Last Assessment  Hospitalizations or Admissions: none  ED Visits: none  Urgent Office Visits: none     Adherence and Self-Administration  Approximate Number of Doses Missed Since Last Assessment: none  Ongoing or New Barriers to Patient Adherence and/or Self-Administration: none   Methods for Supporting Patient Adherence and/or Self-Administration: N/A     Goals of Therapy  Progress Toward Meeting Patient-Identified  Goals of Therapy: On Track  New Patient-Identified Goals, If Applicable:     Progress Toward Meeting Clinical Goals or Therapeutic Targets: On Track  New Clinical Goals or Therapeutic Targets, If Applicable:     Quality of Life Assessment   Quality of Life related to the patient's specialty therapy was discussed with the patient. The QOL segment of this outreach has been reviewed and updated.     Quality of Life Assessment  Quality of Life Improvement Scale: 10-Significantly better    Reassessment Plan & Follow-Up  Medication Therapy Changes: none  Additional Plans, Therapy Recommendations, or Therapy Problems to Be Addressed: none   PharmD to perform clinical re-assessments every ~6 months to review efficacy, tolerance, and adherence to medication.    Attestation  I attest that the specialty medication addressed above is appropriate for the patient based on my reassessment.  If the prescribed therapy is at any point deemed not appropriate based on the current or future assessments, a consultation will be initiated with the patient's specialty care provider to determine the best course of action. The revised plan of therapy will be documented along with any additional patient education provided.     Electronically signed by Giovani Holguin RPH, 09/13/24, 3:09 PM EDT.

## 2024-09-16 ENCOUNTER — PATIENT MESSAGE (OUTPATIENT)
Dept: INTERNAL MEDICINE | Facility: CLINIC | Age: 69
End: 2024-09-16
Payer: MEDICARE

## 2024-09-17 RX ORDER — IPRATROPIUM BROMIDE 42 UG/1
SPRAY, METERED NASAL
Qty: 15 ML | Refills: 3 | Status: SHIPPED | OUTPATIENT
Start: 2024-09-17

## 2024-09-26 ENCOUNTER — HOSPITAL ENCOUNTER (OUTPATIENT)
Dept: GENERAL RADIOLOGY | Facility: HOSPITAL | Age: 69
Discharge: HOME | End: 2024-09-26
Admitting: INTERNAL MEDICINE
Payer: MEDICARE

## 2024-09-26 ENCOUNTER — OFFICE VISIT (OUTPATIENT)
Dept: INTERNAL MEDICINE | Facility: CLINIC | Age: 69
End: 2024-09-26
Payer: MEDICARE

## 2024-09-26 VITALS
DIASTOLIC BLOOD PRESSURE: 100 MMHG | WEIGHT: 210 LBS | HEART RATE: 80 BPM | BODY MASS INDEX: 35.85 KG/M2 | TEMPERATURE: 97.1 F | OXYGEN SATURATION: 96 % | HEIGHT: 64 IN | RESPIRATION RATE: 18 BRPM | SYSTOLIC BLOOD PRESSURE: 156 MMHG

## 2024-09-26 DIAGNOSIS — M54.41 CHRONIC BILATERAL LOW BACK PAIN WITH BILATERAL SCIATICA: Primary | ICD-10-CM

## 2024-09-26 DIAGNOSIS — Z79.4 TYPE 2 DIABETES MELLITUS WITHOUT COMPLICATION, WITH LONG-TERM CURRENT USE OF INSULIN: ICD-10-CM

## 2024-09-26 DIAGNOSIS — M54.42 CHRONIC BILATERAL LOW BACK PAIN WITH BILATERAL SCIATICA: Primary | ICD-10-CM

## 2024-09-26 DIAGNOSIS — E11.9 TYPE 2 DIABETES MELLITUS WITHOUT COMPLICATION, WITH LONG-TERM CURRENT USE OF INSULIN: ICD-10-CM

## 2024-09-26 DIAGNOSIS — J32.0 MAXILLARY SINUSITIS, UNSPECIFIED CHRONICITY: ICD-10-CM

## 2024-09-26 DIAGNOSIS — M54.41 CHRONIC BILATERAL LOW BACK PAIN WITH BILATERAL SCIATICA: ICD-10-CM

## 2024-09-26 DIAGNOSIS — M54.42 CHRONIC BILATERAL LOW BACK PAIN WITH BILATERAL SCIATICA: ICD-10-CM

## 2024-09-26 DIAGNOSIS — G89.29 CHRONIC BILATERAL LOW BACK PAIN WITH BILATERAL SCIATICA: ICD-10-CM

## 2024-09-26 DIAGNOSIS — G89.29 CHRONIC BILATERAL LOW BACK PAIN WITH BILATERAL SCIATICA: Primary | ICD-10-CM

## 2024-09-26 PROCEDURE — 1125F AMNT PAIN NOTED PAIN PRSNT: CPT | Performed by: INTERNAL MEDICINE

## 2024-09-26 PROCEDURE — 72100 X-RAY EXAM L-S SPINE 2/3 VWS: CPT

## 2024-09-26 PROCEDURE — 3077F SYST BP >= 140 MM HG: CPT | Performed by: INTERNAL MEDICINE

## 2024-09-26 PROCEDURE — G2211 COMPLEX E/M VISIT ADD ON: HCPCS | Performed by: INTERNAL MEDICINE

## 2024-09-26 PROCEDURE — 99214 OFFICE O/P EST MOD 30 MIN: CPT | Performed by: INTERNAL MEDICINE

## 2024-09-26 PROCEDURE — 3044F HG A1C LEVEL LT 7.0%: CPT | Performed by: INTERNAL MEDICINE

## 2024-09-26 PROCEDURE — 3080F DIAST BP >= 90 MM HG: CPT | Performed by: INTERNAL MEDICINE

## 2024-09-27 ENCOUNTER — HOSPITAL ENCOUNTER (OUTPATIENT)
Facility: HOSPITAL | Age: 69
Discharge: HOME | End: 2024-09-27
Admitting: ALLERGY & IMMUNOLOGY
Payer: MEDICARE

## 2024-09-27 VITALS
DIASTOLIC BLOOD PRESSURE: 73 MMHG | HEART RATE: 76 BPM | WEIGHT: 215.5 LBS | OXYGEN SATURATION: 96 % | RESPIRATION RATE: 16 BRPM | TEMPERATURE: 98 F | SYSTOLIC BLOOD PRESSURE: 140 MMHG | BODY MASS INDEX: 36.97 KG/M2

## 2024-09-27 DIAGNOSIS — Z95.828 PORT-A-CATH IN PLACE: Primary | ICD-10-CM

## 2024-09-27 DIAGNOSIS — D80.1 COMMON VARIABLE AGAMMAGLOBULINEMIA: ICD-10-CM

## 2024-09-27 PROCEDURE — 25810000003 SODIUM CHLORIDE 0.9 % SOLUTION: Performed by: ALLERGY & IMMUNOLOGY

## 2024-09-27 PROCEDURE — 96366 THER/PROPH/DIAG IV INF ADDON: CPT

## 2024-09-27 PROCEDURE — 96365 THER/PROPH/DIAG IV INF INIT: CPT

## 2024-09-27 PROCEDURE — 25010000002 IMMUNE GLOBULIN (HUMAN) 20 GM/200ML SOLUTION: Performed by: ALLERGY & IMMUNOLOGY

## 2024-09-27 PROCEDURE — 25010000002 HEPARIN LOCK FLUSH PER 10 UNITS: Performed by: ALLERGY & IMMUNOLOGY

## 2024-09-27 PROCEDURE — 96361 HYDRATE IV INFUSION ADD-ON: CPT

## 2024-09-27 RX ORDER — DIPHENHYDRAMINE HCL 25 MG
50 CAPSULE ORAL ONCE AS NEEDED
Status: DISCONTINUED | OUTPATIENT
Start: 2024-09-27 | End: 2024-09-28 | Stop reason: HOSPADM

## 2024-09-27 RX ORDER — SODIUM CHLORIDE 0.9 % (FLUSH) 0.9 %
10 SYRINGE (ML) INJECTION AS NEEDED
Status: DISCONTINUED | OUTPATIENT
Start: 2024-09-27 | End: 2024-09-28 | Stop reason: HOSPADM

## 2024-09-27 RX ORDER — PREDNISONE 20 MG/1
40 TABLET ORAL ONCE AS NEEDED
Status: DISCONTINUED | OUTPATIENT
Start: 2024-09-27 | End: 2024-09-28 | Stop reason: HOSPADM

## 2024-09-27 RX ORDER — EPINEPHRINE 1 MG/ML
0.3 INJECTION, SOLUTION INTRAMUSCULAR; SUBCUTANEOUS ONCE AS NEEDED
Start: 2024-10-25

## 2024-09-27 RX ORDER — DIPHENHYDRAMINE HCL 25 MG
50 CAPSULE ORAL ONCE AS NEEDED
Start: 2024-10-25

## 2024-09-27 RX ORDER — SODIUM CHLORIDE 0.9 % (FLUSH) 0.9 %
10 SYRINGE (ML) INJECTION AS NEEDED
OUTPATIENT
Start: 2024-09-27

## 2024-09-27 RX ORDER — ACETAMINOPHEN 325 MG/1
325 TABLET ORAL ONCE
Status: DISCONTINUED | OUTPATIENT
Start: 2024-09-27 | End: 2024-09-28 | Stop reason: HOSPADM

## 2024-09-27 RX ORDER — HEPARIN SODIUM (PORCINE) LOCK FLUSH IV SOLN 100 UNIT/ML 100 UNIT/ML
500 SOLUTION INTRAVENOUS AS NEEDED
OUTPATIENT
Start: 2024-09-27

## 2024-09-27 RX ORDER — PREDNISONE 20 MG/1
40 TABLET ORAL ONCE
Start: 2024-10-25 | End: 2024-10-25

## 2024-09-27 RX ORDER — METHYLPREDNISOLONE SODIUM SUCCINATE 125 MG/2ML
50 INJECTION, POWDER, LYOPHILIZED, FOR SOLUTION INTRAMUSCULAR; INTRAVENOUS ONCE AS NEEDED
Start: 2024-10-25

## 2024-09-27 RX ORDER — PREDNISONE 20 MG/1
40 TABLET ORAL ONCE
Status: DISCONTINUED | OUTPATIENT
Start: 2024-09-27 | End: 2024-09-28 | Stop reason: HOSPADM

## 2024-09-27 RX ORDER — HEPARIN SODIUM (PORCINE) LOCK FLUSH IV SOLN 100 UNIT/ML 100 UNIT/ML
500 SOLUTION INTRAVENOUS AS NEEDED
Status: DISCONTINUED | OUTPATIENT
Start: 2024-09-27 | End: 2024-09-28 | Stop reason: HOSPADM

## 2024-09-27 RX ORDER — ACETAMINOPHEN 325 MG/1
325 TABLET ORAL ONCE
OUTPATIENT
Start: 2024-10-25

## 2024-09-27 RX ORDER — DIPHENHYDRAMINE HCL 25 MG
50 CAPSULE ORAL ONCE
Status: DISCONTINUED | OUTPATIENT
Start: 2024-09-27 | End: 2024-09-28 | Stop reason: HOSPADM

## 2024-09-27 RX ORDER — PREDNISONE 20 MG/1
40 TABLET ORAL ONCE AS NEEDED
Start: 2024-10-25

## 2024-09-27 RX ORDER — EPINEPHRINE 1 MG/ML
0.3 INJECTION, SOLUTION INTRAMUSCULAR; SUBCUTANEOUS ONCE AS NEEDED
Status: DISCONTINUED | OUTPATIENT
Start: 2024-09-27 | End: 2024-09-28 | Stop reason: HOSPADM

## 2024-09-27 RX ORDER — METHYLPREDNISOLONE SODIUM SUCCINATE 125 MG/2ML
50 INJECTION, POWDER, LYOPHILIZED, FOR SOLUTION INTRAMUSCULAR; INTRAVENOUS ONCE AS NEEDED
Status: DISCONTINUED | OUTPATIENT
Start: 2024-09-27 | End: 2024-09-28 | Stop reason: HOSPADM

## 2024-09-27 RX ORDER — DIPHENHYDRAMINE HCL 25 MG
50 CAPSULE ORAL ONCE
Start: 2024-10-25 | End: 2024-10-25

## 2024-09-27 RX ADMIN — Medication 10 ML: at 13:09

## 2024-09-27 RX ADMIN — SODIUM CHLORIDE 500 ML: 9 INJECTION, SOLUTION INTRAVENOUS at 08:53

## 2024-09-27 RX ADMIN — IMMUNE GLOBULIN INFUSION (HUMAN) 20 G: 100 INJECTION, SOLUTION INTRAVENOUS; SUBCUTANEOUS at 09:39

## 2024-09-27 RX ADMIN — HEPARIN 500 UNITS: 100 SYRINGE at 13:09

## 2024-09-27 RX ADMIN — IMMUNE GLOBULIN INFUSION (HUMAN) 20 G: 100 INJECTION, SOLUTION INTRAVENOUS; SUBCUTANEOUS at 11:34

## 2024-10-01 ENCOUNTER — TELEPHONE (OUTPATIENT)
Dept: PULMONOLOGY | Facility: CLINIC | Age: 69
End: 2024-10-01

## 2024-10-01 NOTE — TELEPHONE ENCOUNTER
DELETE AFTER REVIEWING: Send the encounter HIGH priority, If patient has less than a 3 day supply. If the patient will run out of medication over the weekend add that information to the additional details line. Send to the appropriate pool. Check your call action grid or workflows.    Caller: Raquel Mariee    Relationship: Self    Best call back number: [phone]    Requested Prescriptions: ASTRAZENICA, NEEDS NEW RX WITH REFILL FOR THE NEXT YEAR FOR Budeson-Glycopyrrol-Formoterol (Breztri Aerosphere) 160-9-4.8 MCG/ACT aerosol inhaler, NEEDS TO BE FAXED .[phone]     Pharmacy where request should be sent:    SEE ABOVE   Last office visit with prescribing clinician: 7/12/2024   Last telemedicine visit with prescribing clinician: Visit date not found   Next office visit with prescribing clinician: 1/17/2025     Additional details provided by patient: NEEDS TO BE COMPLETED FOR 2025 ENROLLMENT    Does the patient have less than a 3 day supply:  [] Yes  [x] No    Would you like a call back once the refill request has been completed: [x] Yes [] No    If the office needs to give you a call back, can they leave a voicemail: [x] Yes [] No    Andrew Abbott Rep   10/01/24 10:03 EDT

## 2024-10-07 ENCOUNTER — PATIENT MESSAGE (OUTPATIENT)
Dept: INTERNAL MEDICINE | Facility: CLINIC | Age: 69
End: 2024-10-07
Payer: MEDICARE

## 2024-10-09 DIAGNOSIS — M54.50 CHRONIC BILATERAL LOW BACK PAIN WITHOUT SCIATICA: Primary | ICD-10-CM

## 2024-10-09 DIAGNOSIS — G89.29 CHRONIC BILATERAL LOW BACK PAIN WITHOUT SCIATICA: Primary | ICD-10-CM

## 2024-10-11 ENCOUNTER — PATIENT ROUNDING (BHMG ONLY) (OUTPATIENT)
Dept: URGENT CARE | Facility: CLINIC | Age: 69
End: 2024-10-11
Payer: MEDICARE

## 2024-10-20 ENCOUNTER — APPOINTMENT (OUTPATIENT)
Dept: CT IMAGING | Facility: HOSPITAL | Age: 69
End: 2024-10-20
Payer: MEDICARE

## 2024-10-20 ENCOUNTER — APPOINTMENT (OUTPATIENT)
Dept: GENERAL RADIOLOGY | Facility: HOSPITAL | Age: 69
End: 2024-10-20
Payer: MEDICARE

## 2024-10-20 ENCOUNTER — HOSPITAL ENCOUNTER (EMERGENCY)
Facility: HOSPITAL | Age: 69
Discharge: ANOTHER HOSPITAL | End: 2024-10-20
Attending: STUDENT IN AN ORGANIZED HEALTH CARE EDUCATION/TRAINING PROGRAM | Admitting: STUDENT IN AN ORGANIZED HEALTH CARE EDUCATION/TRAINING PROGRAM
Payer: MEDICARE

## 2024-10-20 VITALS
RESPIRATION RATE: 18 BRPM | HEIGHT: 64 IN | TEMPERATURE: 97.8 F | SYSTOLIC BLOOD PRESSURE: 166 MMHG | WEIGHT: 205 LBS | OXYGEN SATURATION: 97 % | DIASTOLIC BLOOD PRESSURE: 92 MMHG | HEART RATE: 81 BPM | BODY MASS INDEX: 35 KG/M2

## 2024-10-20 DIAGNOSIS — H53.411: Primary | ICD-10-CM

## 2024-10-20 LAB
ALBUMIN SERPL-MCNC: 4.1 G/DL (ref 3.5–5.2)
ALBUMIN/GLOB SERPL: 1.5 G/DL
ALP SERPL-CCNC: 52 U/L (ref 39–117)
ALT SERPL W P-5'-P-CCNC: 17 U/L (ref 1–33)
ANION GAP SERPL CALCULATED.3IONS-SCNC: 10.1 MMOL/L (ref 5–15)
AST SERPL-CCNC: 25 U/L (ref 1–32)
BASOPHILS # BLD AUTO: 0.06 10*3/MM3 (ref 0–0.2)
BASOPHILS NFR BLD AUTO: 1.3 % (ref 0–1.5)
BILIRUB SERPL-MCNC: 0.4 MG/DL (ref 0–1.2)
BUN SERPL-MCNC: 15 MG/DL (ref 8–23)
BUN/CREAT SERPL: 14.2 (ref 7–25)
CALCIUM SPEC-SCNC: 8.9 MG/DL (ref 8.6–10.5)
CHLORIDE SERPL-SCNC: 97 MMOL/L (ref 98–107)
CO2 SERPL-SCNC: 23.9 MMOL/L (ref 22–29)
CREAT SERPL-MCNC: 1.06 MG/DL (ref 0.57–1)
DEPRECATED RDW RBC AUTO: 43.8 FL (ref 37–54)
EGFRCR SERPLBLD CKD-EPI 2021: 57.3 ML/MIN/1.73
EOSINOPHIL # BLD AUTO: 0.03 10*3/MM3 (ref 0–0.4)
EOSINOPHIL NFR BLD AUTO: 0.7 % (ref 0.3–6.2)
ERYTHROCYTE [DISTWIDTH] IN BLOOD BY AUTOMATED COUNT: 12.9 % (ref 12.3–15.4)
GLOBULIN UR ELPH-MCNC: 2.7 GM/DL
GLUCOSE SERPL-MCNC: 122 MG/DL (ref 65–99)
HCT VFR BLD AUTO: 33.7 % (ref 34–46.6)
HGB BLD-MCNC: 11.6 G/DL (ref 12–15.9)
HOLD SPECIMEN: NORMAL
HOLD SPECIMEN: NORMAL
IMM GRANULOCYTES # BLD AUTO: 0.03 10*3/MM3 (ref 0–0.05)
IMM GRANULOCYTES NFR BLD AUTO: 0.7 % (ref 0–0.5)
LYMPHOCYTES # BLD AUTO: 1.31 10*3/MM3 (ref 0.7–3.1)
LYMPHOCYTES NFR BLD AUTO: 29 % (ref 19.6–45.3)
MCH RBC QN AUTO: 31.6 PG (ref 26.6–33)
MCHC RBC AUTO-ENTMCNC: 34.4 G/DL (ref 31.5–35.7)
MCV RBC AUTO: 91.8 FL (ref 79–97)
MONOCYTES # BLD AUTO: 0.43 10*3/MM3 (ref 0.1–0.9)
MONOCYTES NFR BLD AUTO: 9.5 % (ref 5–12)
NEUTROPHILS NFR BLD AUTO: 2.65 10*3/MM3 (ref 1.7–7)
NEUTROPHILS NFR BLD AUTO: 58.8 % (ref 42.7–76)
NRBC BLD AUTO-RTO: 0 /100 WBC (ref 0–0.2)
PLATELET # BLD AUTO: 221 10*3/MM3 (ref 140–450)
PMV BLD AUTO: 9 FL (ref 6–12)
POTASSIUM SERPL-SCNC: 4.4 MMOL/L (ref 3.5–5.2)
PROT SERPL-MCNC: 6.8 G/DL (ref 6–8.5)
RBC # BLD AUTO: 3.67 10*6/MM3 (ref 3.77–5.28)
SODIUM SERPL-SCNC: 131 MMOL/L (ref 136–145)
TROPONIN T SERPL HS-MCNC: 23 NG/L
WBC NRBC COR # BLD AUTO: 4.51 10*3/MM3 (ref 3.4–10.8)
WHOLE BLOOD HOLD COAG: NORMAL
WHOLE BLOOD HOLD SPECIMEN: NORMAL

## 2024-10-20 PROCEDURE — 99285 EMERGENCY DEPT VISIT HI MDM: CPT

## 2024-10-20 PROCEDURE — 0042T HC CT CEREBRAL PERFUSION W/WO CONTRAST: CPT

## 2024-10-20 PROCEDURE — 70496 CT ANGIOGRAPHY HEAD: CPT

## 2024-10-20 PROCEDURE — 36415 COLL VENOUS BLD VENIPUNCTURE: CPT

## 2024-10-20 PROCEDURE — 85025 COMPLETE CBC W/AUTO DIFF WBC: CPT | Performed by: STUDENT IN AN ORGANIZED HEALTH CARE EDUCATION/TRAINING PROGRAM

## 2024-10-20 PROCEDURE — 70498 CT ANGIOGRAPHY NECK: CPT

## 2024-10-20 PROCEDURE — 99204 OFFICE O/P NEW MOD 45 MIN: CPT | Performed by: PSYCHIATRY & NEUROLOGY

## 2024-10-20 PROCEDURE — 71045 X-RAY EXAM CHEST 1 VIEW: CPT

## 2024-10-20 PROCEDURE — 80053 COMPREHEN METABOLIC PANEL: CPT | Performed by: STUDENT IN AN ORGANIZED HEALTH CARE EDUCATION/TRAINING PROGRAM

## 2024-10-20 PROCEDURE — 84484 ASSAY OF TROPONIN QUANT: CPT | Performed by: STUDENT IN AN ORGANIZED HEALTH CARE EDUCATION/TRAINING PROGRAM

## 2024-10-20 PROCEDURE — 93005 ELECTROCARDIOGRAM TRACING: CPT | Performed by: STUDENT IN AN ORGANIZED HEALTH CARE EDUCATION/TRAINING PROGRAM

## 2024-10-20 PROCEDURE — 25510000001 IOPAMIDOL 61 % SOLUTION: Performed by: STUDENT IN AN ORGANIZED HEALTH CARE EDUCATION/TRAINING PROGRAM

## 2024-10-20 PROCEDURE — 70450 CT HEAD/BRAIN W/O DYE: CPT

## 2024-10-20 RX ORDER — SODIUM CHLORIDE 0.9 % (FLUSH) 0.9 %
10 SYRINGE (ML) INJECTION AS NEEDED
Status: DISCONTINUED | OUTPATIENT
Start: 2024-10-20 | End: 2024-10-20 | Stop reason: HOSPADM

## 2024-10-20 RX ORDER — IOPAMIDOL 612 MG/ML
100 INJECTION, SOLUTION INTRAVASCULAR
Status: COMPLETED | OUTPATIENT
Start: 2024-10-20 | End: 2024-10-20

## 2024-10-20 RX ORDER — IOPAMIDOL 612 MG/ML
50 INJECTION, SOLUTION INTRAVASCULAR
Status: COMPLETED | OUTPATIENT
Start: 2024-10-20 | End: 2024-10-20

## 2024-10-20 RX ADMIN — IOPAMIDOL 40 ML: 612 INJECTION, SOLUTION INTRAVENOUS at 10:31

## 2024-10-20 RX ADMIN — IOPAMIDOL 100 ML: 612 INJECTION, SOLUTION INTRAVENOUS at 10:32

## 2024-10-20 NOTE — ED NOTES
Called [facility] att inititiating transfer consult per DO Mary Jo request. Was connected to Dr. Rae, Ophthalmologist. Call transferred.

## 2024-10-20 NOTE — CONSULTS
[facility]   Teleneurology Note    Patient Name: Raquel Mariee  : 1955  MRN: [id number]  Primary Care Physician: Sanjeev Rawls MD  Referring Site: [place]  Location of Neurologist: [place]    Subjective   Teleneurology Initial Data     Arrival Date Telestroke Site: 10/13/24 Arrival Time Telestroke Site: 1013   Neurologist Evaluation Date: 10/20/24 Neurologist Evaluation Time: 1009   Date Last Known Well: 10/20/24 Time Last Known Well: 0800     History     Chief Complaint: PAtient woke up felt well, and sat down to put on her makeup closed her left eye and noticed she could not see out of her right eye  HPI: 68 year old with right eye obscuration.This took place at about, 8 am this morning. Visual scan shows no issues. SHe has a history of right eye visual blurring.    Stroke Risk Factors/ Pertinent Data     Stroke risk factors: none  Anticoagulants prior to arrival: not applicable  Antiplatelets prior to arrival: not applicable  Statins prior to arrival: not applicable     Scoring Scales     Modified [place] Scale  Pre-Stroke Modified [place] Scale: 0 - No Symptoms at all.  Intracerebral Hemmorhage (ICH) Score  [place] Coma Score: 13-15  Age>=80: no  [place] Coma Scale  Best Eye Response: Spontaneous  Best Verbal Response: Oriented  Best Motor Response: Follows commands  [place] Coma Scale Score: 15    NIH Stroke Scale     NIHSS Performed Date: 10/20/24 NIHSS Performed Time: 1013      NIHSS per my scale is 0.   She has right eye visual loss and no visual field deficit.   Review of Systems     Review of Systems   Constitutional: Negative.    HENT: Negative.     Eyes: Negative.    Respiratory: Negative.     Cardiovascular: Negative.    Gastrointestinal: Negative.    Endocrine: Negative.    Genitourinary: Negative.    Musculoskeletal: Negative.    Neurological:         Right Eye visual loss onset is unknown       Objective   Exam     Exam performed with the help of support staff from the referring  site  Neurological Exam  1a. Level of Consciousness: 0-->Alert, keenly responsive  1b. LOC Questions: 0-->Answers both questions correctly  1c. LOC Commands: 0-->Performs both tasks correctly  2. Best Gaze: 0-->Normal  3. Visual: 0-->No visual loss  4. Facial Palsy: 0-->Normal symmetrical movements  5a. Motor Arm, Left: 0-->No drift, limb holds 90 (or 45) degrees for full 10 secs  5b. Motor Arm, Right: 0-->No drift, limb holds 90 (or 45) degrees for full 10 secs  6a. Motor Leg, Left: 0-->No drift, leg holds 30 degree position for full 5 secs  6b. Motor Leg, Right: 0-->No drift, leg holds 30 degree position for full 5 secs  7. Limb Ataxia: 0-->Absent  8. Sensory: 0-->Normal, no sensory loss  9. Best Language: 0-->No aphasia, normal  10. Dysarthria: 0-->Normal  11. Extinction and Inattention (formerly Neglect): 0-->No abnormality  Total (NIH Stroke Scale): 0  Result Review    Results          Personal review of CNS imaging:(Official report by radiologist pending)  Imaging  CT Imaging Review: CT Imaging reviewed, NO acute infarct/ hemorrhage seen  CTA Imaging Review: CTA Imaging reviewed, NO large vessel occlusion or severe stenosis seen  CT Perfusion Review: CT perfusion reviewed, NORMAL    Thrombolytic   Thrombolytics: thrombolytic not given  Thrombolytic Exclusion Criteria: Onset unknown or GREATER than 4.5 hours  Thrombolytic Relative Exclusions for All Patients: Only minor non-disabling symptoms         Assessment & Plan   Assessment/ Plan     Assessment: Patients payal is unknown. She has a right sided visual eye deficit that that she states she cannot see out of. The last time she closed her right eye to look out of her left was threading a needle was about 5 days prior, and it was normal. Today she sat down to put on makeup and noticed it when she closed the left eye but otherwise felt she is normal. She does not notice the deficit when she has her eyes open now.      Plan:  STAT Ophthalmology consult with .    R/O Opthal hemorrhage.   No TNK because onset is uknown  ESR/CRP           Disposition          Medical Decision Making  Medical Data Reviewed: Data reviewed including: clinical labs, radiology and/or medical tests    IEh MD, saw the patient on 10/20/24 at 100 for an initial in-patient or emergency room telememedicine face to face consult using interactive technology for  . The location of the patient was [place]. I was located at [place].    I have proceeded with this evaluation at the request of the referring practitioner as it is felt to be an emergency setting and no appropriate specialist is available to perform this evaluation. The originating hospital has reported that this is the correct patient and has obtained consent from the patient/surrogate to perform this telemedicine evaluation(including obtaining history, performing examination and reviewing data provided by the patient an/or originating site of care provider)    I have introduced myself to the patient, provided my credentials, disclosed my location, and determined that, based on review of the patient's chart and discussion with the patient's primary team, telemedicine via a HIPAA compliant, real-time, face-to-face two-way, interactive audio and video platform is an appropriate and effective means of providing the service.    The patient/surrogate has a right to refuse this evaluation as they have been explained risks including potential loss of confidentiality, benefits, alternatives, and the potential need for subsequent face-to-face care. In this evaluation, we will be providing recommendations only.  The ultimate decision to follow or not to follow these recommendations will be left to the bedside treating/requesting practitioner.    The patient/surrogate has been notified that other healthcare professionals including technical person may be involved in this A/V evaluation.  All laws concerning confidentiality and patient  access to medical records and copies of medical records apply to telemedicine.  The patient/surrogate has received the originating site's Health Notice of Privacy Practices.    Eh Murray MD

## 2024-10-20 NOTE — ED PROVIDER NOTES
[facility] EMERGENCY DEPARTMENT  Emergency Department Encounter  Emergency Medicine Physician Note       Pt Name: Raquel Mariee  MRN: [id number]  Pt :   1955  Room Number:    Date of encounter:  10/20/2024  PCP: Sanjeev Rawls MD  ED Physician: Temo Camargo DO    HPI:  Raquel Mariee is a 68 y.o. female who presents to the ED with chief complaint of vision loss.  Patient reports sudden onset of symptoms around 8 AM.  States that she lost vision in the right eye.  Specifically, in the central part of her visual field.  Duration constant.  No modifying factors. No pain, redness, tearing, or drainage.  No other associated symptoms including headache, neck pain, slurred speech, numbness or weakness in extremities, chest pain, abdominal pain, back pain.    Past medical history of CVID, TIA, hypertension, diabetes.    PAST MEDICAL HISTORY  Past Medical History:   Diagnosis Date    Anxiety     Aortic valve stenosis     Cardiac murmur     Cataract, bilateral     CHF (congestive heart failure)     Chronic kidney disease     Stage III    Clostridium difficile infection     in her 30s    Common variable immunodeficiency     IVIG infusions    COPD (chronic obstructive pulmonary disease)     Depression     Diabetes mellitus     type II    Eosinophilic asthma     Fibromyalgia, primary     Full dentures     GERD (gastroesophageal reflux disease)     History of transfusion     in  at [facility].  No reaction noted, patient states she does have an antibody from transfusion.    Hypertension     Hypogammaglobulinemia     Hypothyroidism     Irritable bowel syndrome     Migraines     Morbid obesity     Osteoarthritis     Prinzmetal angina     Pseudomonas infection 1998    lungs    PTSD (post-traumatic stress disorder)     Pulmonary edema     Renal insufficiency     Sinus tachycardia     Sleep apnea     no longer uses/needs cpap    Tachycardia     TIA (transient  ischemic attack) 2017    Trochanteric bursitis 01/25/2023     Current Outpatient Medications   Medication Instructions    albuterol sulfate  (90 Base) MCG/ACT inhaler Inhale 2 puffs by mouth every 4 to 6 hours as needed for cough, wheeze, shortness of breath. Use with vortex spacer    allopurinol (ZYLOPRIM) 100 mg, Oral, Daily    Antacid Maximum 500 mg, Oral, 3 Times Daily    betamethasone valerate (VALISONE) 0.1 % cream Apply topically to the appropriate area as directed Daily.    Budeson-Glycopyrrol-Formoterol (Breztri Aerosphere) 160-9-4.8 MCG/ACT aerosol inhaler Inhale 2 puffs by mouth twice daily. Rinse mouth after use    cholecalciferol (VITAMIN D3) 1,000 Units, Oral, Daily    cloNIDine (CATAPRES) 0.1 mg, Oral, 2 Times Daily    diphenhydrAMINE (Benadryl Allergy) 25 MG tablet Take 1-2 tablets by mouth Every 4-6 Hours As Needed.    doxycycline (MONODOX) 100 mg, Oral, 2 Times Daily    DULoxetine (Cymbalta) 30 MG capsule Take 1 capsule every day by oral route    estradiol (ESTRACE) 0.1 MG/GM vaginal cream Insert 0.5 grams vaginally twice weekly    ferrous sulfate 325 (65 FE) MG EC tablet 2 tablets, Oral, Daily With Breakfast    fexofenadine (ALLEGRA) 180 mg, Oral, Daily    fluticasone (FLONASE) 50 MCG/ACT nasal spray Instill 1 spray into each nostril as directed by provider Daily.    Fluticasone Propionate (Xhance) 93 MCG/ACT Exhaler Suspension Instill 1 spray in both nostrils twice a day    furosemide (LASIX) 40 MG tablet Take 1 tablet by mouth Daily, twice a month    Gammagard 40 g, Intravenous, Every 28 Days    glucosamine-chondroitin 500-400 MG capsule capsule 1 capsule, Oral, 2 Times Daily With Meals    guaifenesin-dextromethorphan 600-30 mg (MUCINEX DM)  MG tablet sustained-release 12 hour 2 tablets, Oral, 2 Times Daily    Hydrocortisone, Perianal, (Anusol-HC) 2.5 % rectal cream Apply rectally 2 times daily    Hypertonic Nasal Wash (Sinus Rinse Kit) pack use once or twice daily as needed     immune globulin, human, (Gammagard) 30 GM/300ML solution infusion 40 grams IV every 4 weeks    ipratropium (ATROVENT) 0.06 % nasal spray Instill 1-2 sprays in each nostril 2-3 times daily as needed    ipratropium-albuterol (DUO-NEB) 0.5-2.5 mg/3 ml nebulizer Inhale the contents of 1 vial through a nebulizer every 4-6 hours as needed for cough,wheezing and shortness of breath.    isosorbide mononitrate (IMDUR) 30 mg, Oral, Every Morning    levothyroxine (SYNTHROID, LEVOTHROID) 50 mcg, Oral, Daily    lidocaine (XYLOCAINE) 5 % ointment 1 Application, Topical, Every 2 Hours PRN    Linzess 145 mcg, Oral, Every Morning Before Breakfast    Magnesium 500 mg, Oral, 2 Times Daily    Misc. Devices (Sitz Bath) misc 1 each, Does not apply, As Needed    montelukast (SINGULAIR) 10 mg, Oral, Every Night at Bedtime    multivitamin with minerals tablet tablet 1 tablet, Oral, Daily    Nucala 100 mg, Subcutaneous, Every 28 Days, Obtaining medication from [place] to Nucala PAP    omeprazole (PRILOSEC) 40 mg, Oral, 2 Times Daily    ondansetron (ZOFRAN) 4 mg, Oral, Every 8 Hours PRN    POTASSIUM CHLORIDE PO 20 mEq, Oral, Take As Directed, Takes two times a month with Lasix    potassium citrate (UROCIT-K) 10 MEQ (1080 MG) CR tablet 10 mEq, Oral, Daily    tiZANidine (ZANAFLEX) 4 mg, Oral, 2 Times Daily PRN    ubrogepant (UBRELVY) 100 mg, Oral, Once As Needed    valsartan (DIOVAN) 160 mg, Oral, Daily    vitamin B-12 (CYANOCOBALAMIN) 1,000 mcg, Oral, Daily      PAST SURGICAL HISTORY  Past Surgical History:   Procedure Laterality Date    APPENDECTOMY      BUNIONECTOMY Bilateral     CARDIAC CATHETERIZATION  2017    no stents needed    CARPAL TUNNEL RELEASE Right     COLONOSCOPY  2021    ENDOMETRIAL ABLATION  1997    EYE SURGERY  ? About 6-8 years ago    Laser for glaucoma    FRACTURE SURGERY  1995    No hardware; Tibeal plateau    GASTRIC BYPASS      HAND SURGERY Left     No hardware    HEMORRHOIDECTOMY N/A 04/09/2024    Procedure: HEMORRHOID  BANDING X2;  Surgeon: Melissa Beck MD;  Location:  [facility];  Service: General;  Laterality: N/A;    INTERSTIM PLACEMENT N/A 05/03/2023    Procedure: INTERSTIM STAGES 1 AND 2 LEAD AND GENERATOR PLACEMENT;  Surgeon: Abner Nation MD;  Location:  [facility];  Service: Urology;  Laterality: N/A;    POSTERIOR VAGINAL REPAIR N/A 08/02/2023    Procedure: POSTERIOR COLPORRHAPHY;  Surgeon: Kellen Galan MD;  Location:  [facility];  Service: Gynecology;  Laterality: N/A;    RECTAL EXAMINATION UNDER ANESTHESIA N/A 04/09/2024    Procedure: RECTAL EXAM UNDER ANESTHESIA;  Surgeon: Melissa Beck MD;  Location:  [facility];  Service: General;  Laterality: N/A;    REPLACEMENT TOTAL KNEE BILATERAL      TEETH EXTRACTION      all of bottom teeth removed    THORACOTOMY      TONSILLECTOMY      TOTAL ABDOMINAL HYSTERECTOMY WITH SALPINGO OOPHORECTOMY      TRACHEAL SURGERY      Repaired via thoracotomy    TUBAL ABDOMINAL LIGATION  1983    VENOUS ACCESS DEVICE (PORT) INSERTION      port a cath in the left chest wall       FAMILY HISTORY  Family History   Problem Relation Age of Onset    Diabetes Mother     Stroke Mother     Heart disease Mother     Hypertension Mother     Endometriosis Mother     Depression Mother     Diabetes Father     Hypertension Father     Stroke Father     Heart attack Father     Asthma Father     COPD Father     Dementia Father     Heart disease Father     COPD Sister     Asthma Sister     Cancer Sister         Nasal cell skin    Brain cancer Sister     Diabetes Sister     Stroke Sister     Heart attack Sister     Endometriosis Sister     Depression Sister     Arthritis Sister         Rheumatoid    Hypertension Sister     Kidney disease Sister     Diabetes Sister     COPD Sister     Asthma Sister     Endometriosis Sister     Depression Sister     Cancer Sister         Glioma    Heart disease Sister     Heart attack Sister     Endometriosis Sister     Asthma Sister     Hypertension Sister      Kidney disease Sister         ESRD    COPD Brother     Asthma Brother         Turned into COPD    Diabetes Brother     Asthma Brother     COPD Brother     Diabetes Brother     Hypertension Brother     Asthma Daughter     Endometriosis Daughter     Osteoarthritis Daughter     Hypertension Daughter     Kidney failure Daughter     Irritable bowel syndrome Daughter     Anxiety disorder Daughter     Bipolar disorder Daughter     Depression Daughter     Self-Injurious Behavior  Daughter     Suicide Attempts Daughter     Mental illness Daughter         Bipolar and eating fisorder    Asthma Daughter     Endometriosis Daughter     Osteoarthritis Daughter     Asthma Son     ADD / ADHD Son     Alcohol abuse Son     Drug abuse Son     Breast cancer Maternal Cousin     Heart disease Maternal Grandfather     Heart disease Maternal Uncle     OCD Neg Hx     Paranoid behavior Neg Hx     Schizophrenia Neg Hx     Seizures Neg Hx     Ovarian cancer Neg Hx        SOCIAL HISTORY  Social History     Socioeconomic History    Marital status:    Tobacco Use    Smoking status: Never     Passive exposure: Never    Smokeless tobacco: Never   Vaping Use    Vaping status: Never Used   Substance and Sexual Activity    Alcohol use: Yes     Comment: ONCE A YEAR    Drug use: Never    Sexual activity: Defer     ALLERGIES  Cefaclor, Cephalexin, Cephalosporins, Escitalopram oxalate, Ambien [zolpidem tartrate], Cardizem [diltiazem hcl], Metoclopramide, Mobic [meloxicam], Penicillins, Sumatriptan, Topamax [topiramate], Verapamil, Zolpidem, Amoxicillin, Edecrin [ethacrynic acid], Hydrochlorothiazide, and Sulfa antibiotics    REVIEW OF SYSTEMS  All systems reviewed and negative except for those discussed in HPI.     PHYSICAL EXAM  ED Triage Vitals [10/20/24 0916]   Temp Heart Rate Resp BP SpO2   97.8 °F (36.6 °C) 71 18 161/80 98 %      Temp src Heart Rate Source Patient Position BP Location FiO2 (%)   -- -- -- -- --     I have reviewed the triage  vital signs and nursing notes.    General: Alert.  Nontoxic appearance.  No acute distress.  Head: Normocephalic.  Atraumatic.  Eyes: Pupils 2 mm.  PERRLA.  EOMI without entrapment.  Visual acuity grossly intact.  Decreased central visual field on the left, otherwise intact.   Cardiovascular: Regular rate and rhythm.  No murmurs.  No rubs.  2+ distal pulses bilaterally.  Respiratory: Equal breath sounds bilaterally.  No rales.  No rhonchi.  No wheezing.  GI: Abdomen is soft.  Nondistended.  Nontender to palpation.  No rebound.  No guarding.  No CVA tenderness.  MSK: No muscle atrophy.  Neurologic: Oriented x 3.  Speech intact without dysarthria or aphasia. Cranial nerves II-XII intact.  Strength 5/5 bilateral upper and lower extremities.  Sensation to touch grossly intact bilaterally.  No focal deficits.  No pronator drift.  Normal finger-nose test.  Normal gait.  Skin: No erythema. No edema. Port left anterior chest wall.  Psych: Normal mood and affect.     LAB RESULTS  Recent Results (from the past 24 hours)   Green Top (Gel)    Collection Time: 10/20/24  9:41 AM   Result Value Ref Range    Extra Tube Hold for add-ons.    Lavender Top    Collection Time: 10/20/24  9:41 AM   Result Value Ref Range    Extra Tube hold for add-on    Gold Top - SST    Collection Time: 10/20/24  9:41 AM   Result Value Ref Range    Extra Tube Hold for add-ons.    Light Blue Top    Collection Time: 10/20/24  9:41 AM   Result Value Ref Range    Extra Tube Hold for add-ons.    Comprehensive Metabolic Panel    Collection Time: 10/20/24  9:41 AM    Specimen: Blood   Result Value Ref Range    Glucose 122 (H) 65 - 99 mg/dL    BUN 15 8 - 23 mg/dL    Creatinine 1.06 (H) 0.57 - 1.00 mg/dL    Sodium 131 (L) 136 - 145 mmol/L    Potassium 4.4 3.5 - 5.2 mmol/L    Chloride 97 (L) 98 - 107 mmol/L    CO2 23.9 22.0 - 29.0 mmol/L    Calcium 8.9 8.6 - 10.5 mg/dL    Total Protein 6.8 6.0 - 8.5 g/dL    Albumin 4.1 3.5 - 5.2 g/dL    ALT (SGPT) 17 1 - 33 U/L     AST (SGOT) 25 1 - 32 U/L    Alkaline Phosphatase 52 39 - 117 U/L    Total Bilirubin 0.4 0.0 - 1.2 mg/dL    Globulin 2.7 gm/dL    A/G Ratio 1.5 g/dL    BUN/Creatinine Ratio 14.2 7.0 - 25.0    Anion Gap 10.1 5.0 - 15.0 mmol/L    eGFR 57.3 (L) >60.0 mL/min/1.73   Single High Sensitivity Troponin T    Collection Time: 10/20/24  9:41 AM    Specimen: Blood   Result Value Ref Range    HS Troponin T 23 (H) <14 ng/L   CBC Auto Differential    Collection Time: 10/20/24  9:41 AM    Specimen: Blood   Result Value Ref Range    WBC 4.51 3.40 - 10.80 10*3/mm3    RBC 3.67 (L) 3.77 - 5.28 10*6/mm3    Hemoglobin 11.6 (L) 12.0 - 15.9 g/dL    Hematocrit 33.7 (L) 34.0 - 46.6 %    MCV 91.8 79.0 - 97.0 fL    MCH 31.6 26.6 - 33.0 pg    MCHC 34.4 31.5 - 35.7 g/dL    RDW 12.9 12.3 - 15.4 %    RDW-SD 43.8 37.0 - 54.0 fl    MPV 9.0 6.0 - 12.0 fL    Platelets 221 140 - 450 10*3/mm3    Neutrophil % 58.8 42.7 - 76.0 %    Lymphocyte % 29.0 19.6 - 45.3 %    Monocyte % 9.5 5.0 - 12.0 %    Eosinophil % 0.7 0.3 - 6.2 %    Basophil % 1.3 0.0 - 1.5 %    Immature Grans % 0.7 (H) 0.0 - 0.5 %    Neutrophils, Absolute 2.65 1.70 - 7.00 10*3/mm3    Lymphocytes, Absolute 1.31 0.70 - 3.10 10*3/mm3    Monocytes, Absolute 0.43 0.10 - 0.90 10*3/mm3    Eosinophils, Absolute 0.03 0.00 - 0.40 10*3/mm3    Basophils, Absolute 0.06 0.00 - 0.20 10*3/mm3    Immature Grans, Absolute 0.03 0.00 - 0.05 10*3/mm3    nRBC 0.0 0.0 - 0.2 /100 WBC       RADIOLOGY  XR Chest 1 View    Result Date: 10/20/2024  PROCEDURE: XR CHEST 1 VW-  HISTORY: Acute Stroke Protocol (onset < 12 hrs)  COMPARISON: February 20, 2024.  FINDINGS: Left-sided port with its tip in the SVC. The heart is enlarged. The mediastinum is unremarkable. Chronic appearing changes without focal consolidation. There is no pneumothorax.  There are no acute osseous abnormalities.      No acute cardiopulmonary process.  Continued followup is recommended.    This report was signed and finalized on 10/20/2024 11:07 AM by  Nikhil Tristan DO.      CT Angiogram Neck    Result Date: 10/20/2024  PROCEDURE: CT ANGIOGRAM NECK-  HISTORY: Stroke, follow up  TECHNIQUE: Thin section axial CT with IV contrast supplemented was performed. 3D MIP imges were reconstructed. The NASCET criteria was utilized to determine the degree of stenosis.   FINDINGS: There is dilatation of the ascending aorta up to 4.1 cm.  Right carotid:  No significant stenosis is seen of the cervical common or internal carotid artery.  Left carotid:  No significant stenosis is seen of the cervical common or internal carotid artery.  Vertebrals:  The left vertebral artery is dominant. No significant stenosis is present.      No significant stenosis.  There is dilatation of the ascending aorta up to 4.1 cm. Continued follow-up recommended.   This study was performed with techniques to keep radiation doses as low as reasonably achievable (ALARA). Individualized dose reduction techniques using automated exposure control or adjustment of mA and/or kV according to the patient size were employed.   CTDI: 28.51 mGy DLP:496.54 mGy.cm   This report was signed and finalized on 10/20/2024 10:44 AM by Nikhil Tristan DO.      CT Angiogram Head w AI Analysis of LVO    Result Date: 10/20/2024  PROCEDURE: CT ANGIOGRAM HEAD W AI ANALYSIS OF LVO-  HISTORY: Stroke, follow up  TECHNIQUE: Thin section axial CT with  and without the administration of Isovue 300 contrast. 3-D MIP images were reformatted.  FINDINGS:  Head CT: The ventricles are normal in size. There is no evidence of hemorrhage .  No masses are identified.  No extra-axial fluid is seen. The sinuses are normal.   CTA: The constituent vessels of the [place] are patent with no areas of significant stenosis or abrupt cut off. There is no evidence of an aneurysm or vascular malformation.      Unremarkable CTA of the brain.    This study was performed with techniques to keep radiation doses as low as reasonably achievable (ALARA).  Individualized dose reduction techniques using automated exposure control or adjustment of mA and/or kV according to the patient size were employed.    CTDI: 28.51 mGy DLP:496.54 mGy.cm    This report was signed and finalized on 10/20/2024 10:38 AM by Nikhil Tristan DO.      CT CEREBRAL PERFUSION WITH & WITHOUT CONTRAST    Result Date: 10/20/2024  CT cerebral perfusion, without and with contrast  HISTORY: Symptoms of CVA. Diplopia.  TECHNIQUE: CT brain perfusion with mapping of flow parameters including transit time, cerebral blood flow and cerebral blood volume. IV contrast was utilized.  FINDINGS: No areas of abnormal transit time are identified.  Cerebral blood volumes and cerebral blood flows are normal.      1.  No evidence of acute large vessel occlusion      CTDI: 28.51 mGy DLP:496.54 mGy.cm  This report was signed and finalized on 10/20/2024 10:34 AM by Nikhil Tristan DO.      CT Head Without Contrast Stroke Protocol    Result Date: 10/20/2024  PROCEDURE: CT HEAD WO CONTRAST STROKE PROTOCOL-  HISTORY: Neuro deficit, acute, stroke suspected  COMPARISON:  None .  TECHNIQUE: Multiple axial CT images were performed from the foramen magnum to the vertex without enhancement.  FINDINGS: There is no CT evidence of hemorrhage. There is no mass, mass effect or midline shift.  There is no hydrocephalus. The paranasal sinuses are clear. Bone windows reveal no acute osseous abnormalities.      No acute hemorhage, mass effect, or midline shift..      CTDI: 28.51 mGy DLP:496.54 mGy.cm   This study was performed with techniques to keep radiation doses as low as reasonably achievable (ALARA). Individualized dose reduction techniques using automated exposure control or adjustment of mA and/or kV according to the patient size were employed.   This report was signed and finalized on 10/20/2024 10:32 AM by Nikhil Tristan DO.       PROCEDURES  Procedures  POINT-OF-CARE OCULAR ULTRASOUND  Date: 10/20/2024  Time: 09:40  Consent: Verbal  consent obtained.  Consent given by: Patient  Patient states understanding of the procedure being performed.  Patient's understanding of the procedure matches the consent given.    Indication: Painless vision loss    Technique: Linear probe was used to obtain transverse and longitudinal views of the right eye.    Findings: Normal lens.  No vitreous hemorrhage or retinal detachment.    Impression: Negative for vitreous hemorrhage or retinal detachment.    Patient tolerated the procedure well with no immediate complications.    Unable to upload images to PACS system.    RISK STRATIFICATION  Interval: baseline  1a. Level of Consciousness: 0-->Alert, keenly responsive  1b. LOC Questions: 0-->Answers both questions correctly  1c. LOC Commands: 0-->Performs both tasks correctly  2. Best Gaze: 0-->Normal  3. Visual: 0-->No visual loss  4. Facial Palsy: 0-->Normal symmetrical movements  5a. Motor Arm, Left: 0-->No drift, limb holds 90 (or 45) degrees for full 10 secs  5b. Motor Arm, Right: 0-->No drift, limb holds 90 (or 45) degrees for full 10 secs  6a. Motor Leg, Left: 0-->No drift, leg holds 30 degree position for full 5 secs  6b. Motor Leg, Right: 0-->No drift, leg holds 30 degree position for full 5 secs  7. Limb Ataxia: 0-->Absent  8. Sensory: 0-->Normal, no sensory loss  9. Best Language: 0-->No aphasia, normal  10. Dysarthria: 0-->Normal  11. Extinction and Inattention (formerly Neglect): 0-->No abnormality    Total (NIH Stroke Scale): 5    MEDICAL DECISION MAKING  68 y.o. female with past medical history listed above who presents painless right sided vision loss.    Vital signs remarkable for hypertension, otherwise within normal limits.    Based on clinical presentation and physical exam, differential diagnosis includes, but is not limited to, retinal detachment, vitreous hemorrhage, CRAO, CRVO, CVA, intracranial structural versus Vasser abnormality, metabolic derangement.    POC ocular ultrasound performed at  bedside and negative for vitreous hemorrhage or retinal detachment.  Please see above procedure note for complete details.    Given possibility of stroke and the fact that the patient remains within window for thrombolytic therapy and/or endovascular intervention I activated ED stroke alert. Patient taken to CT scanner. Stat neurology consultation placed.    At least 3 different tests have been ordered on this patient.    Please see ED course below for my interpretation of the ED workup.    ED Course as of 10/20/24 1124   Sun Oct 20, 2024   0945 Case discussed with Dr. Murray (tele-neurology). Agrees to see patient as a consult. Wants CT perfusion with stroke scans. [JS]   1122 ECG 12 Lead Stroke Evaluation  EKG per my interpretation normal sinus rhythm, rate 74, leftward axis, no ST segment elevation or depression, normal QRS QTC intervals.   [JS]   1122 I reviewed the labs listed above. Clinically unremarkable.    Notable findings are highlighted below.    Old laboratory data was reviewed from the medical records and compared to today's results.   [JS]   1122 CBC & Differential(!) [JS]   1122 WBC: 4.51 [JS]   1122 Hemoglobin(!): 11.6 [JS]   1122 Comprehensive Metabolic Panel(!) [JS]   1122 Creatinine(!): 1.06 [JS]   1122 HS Troponin T(!): 23 [JS]   1122 I have independently reviewed and interpreted the imaging listed above.  My interpretations are listed below:    -CT head negative for intracranial hemorrhage or mass effect.    -CTA head neck negative for large vessel occlusion.     [JS]   1122 Patient evaluated by Dr. Murray. Recommended transfer to tertiary [facility] for evaluation by ophthalmology. Not TPA candidate. [JS]   1123 Case discussed with Dr. Vital (ophthalmology) at [facility]. Will see patient as consult. Recommends transfer to [facility].    Patient formally accepted for transfer by Dr. Robin (triage physician).   [JS]      ED Course User Index  [JS]  Temo Camargo DO      On re-evaluation, patient resting comfortably.  Vital signs remained stable on room air.     I discussed the findings of the ED workup with the patient including my recommendation for transfer.  Patient agreeable with plan and disposition.    Please see ED course above for documentation on accepting physician/hospital.    Chronic conditions affecting care: None    Social determinants of health impacting treatment or disposition: None    REPEAT VITAL SIGNS  AS OF 11:24 EDT VITALS:  BP - 161/80  HR - 71  TEMP - 97.8 °F (36.6 °C)  O2 SATS - 98%    DIAGNOSIS  Final diagnoses:   Vision loss, central, right     DISPOSITION  ED Disposition       ED Disposition   Transfer to Another Facility     Condition   --    Comment   --             Please note that portions of this document were completed with voice recognition software.        Temo Camargo DO  10/21/24 0819

## 2024-10-24 ENCOUNTER — READMISSION MANAGEMENT (OUTPATIENT)
Dept: CALL CENTER | Facility: HOSPITAL | Age: 69
End: 2024-10-24
Payer: MEDICARE

## 2024-10-24 RX ORDER — ISOSORBIDE MONONITRATE 30 MG/1
30 TABLET, EXTENDED RELEASE ORAL EVERY MORNING
Qty: 90 TABLET | Refills: 3 | Status: CANCELLED | OUTPATIENT
Start: 2024-10-24

## 2024-10-25 ENCOUNTER — HOSPITAL ENCOUNTER (OUTPATIENT)
Facility: HOSPITAL | Age: 69
Discharge: HOME | End: 2024-10-25
Payer: MEDICARE

## 2024-10-25 ENCOUNTER — TRANSITIONAL CARE MANAGEMENT TELEPHONE ENCOUNTER (OUTPATIENT)
Dept: CALL CENTER | Facility: HOSPITAL | Age: 69
End: 2024-10-25
Payer: MEDICARE

## 2024-10-25 VITALS
HEART RATE: 77 BPM | SYSTOLIC BLOOD PRESSURE: 153 MMHG | WEIGHT: 218 LBS | TEMPERATURE: 97.8 F | RESPIRATION RATE: 18 BRPM | HEIGHT: 65 IN | BODY MASS INDEX: 36.32 KG/M2 | DIASTOLIC BLOOD PRESSURE: 82 MMHG | OXYGEN SATURATION: 95 %

## 2024-10-25 DIAGNOSIS — Z95.828 PORT-A-CATH IN PLACE: Primary | ICD-10-CM

## 2024-10-25 DIAGNOSIS — D80.1 COMMON VARIABLE AGAMMAGLOBULINEMIA: ICD-10-CM

## 2024-10-25 PROCEDURE — 25010000002 IMMUNE GLOBULIN (HUMAN) 20 GM/200ML SOLUTION: Performed by: ALLERGY & IMMUNOLOGY

## 2024-10-25 PROCEDURE — 96365 THER/PROPH/DIAG IV INF INIT: CPT

## 2024-10-25 PROCEDURE — 96360 HYDRATION IV INFUSION INIT: CPT

## 2024-10-25 PROCEDURE — 25010000002 HEPARIN LOCK FLUSH PER 10 UNITS: Performed by: ALLERGY & IMMUNOLOGY

## 2024-10-25 PROCEDURE — 96366 THER/PROPH/DIAG IV INF ADDON: CPT

## 2024-10-25 PROCEDURE — 25810000003 SODIUM CHLORIDE 0.9 % SOLUTION: Performed by: ALLERGY & IMMUNOLOGY

## 2024-10-25 RX ORDER — SODIUM CHLORIDE 0.9 % (FLUSH) 0.9 %
10 SYRINGE (ML) INJECTION AS NEEDED
Status: DISCONTINUED | OUTPATIENT
Start: 2024-10-25 | End: 2024-10-26 | Stop reason: HOSPADM

## 2024-10-25 RX ORDER — PREDNISONE 20 MG/1
40 TABLET ORAL ONCE AS NEEDED
Start: 2024-11-22

## 2024-10-25 RX ORDER — ACETAMINOPHEN 325 MG/1
325 TABLET ORAL ONCE
Status: DISCONTINUED | OUTPATIENT
Start: 2024-10-25 | End: 2024-10-26 | Stop reason: HOSPADM

## 2024-10-25 RX ORDER — DIPHENHYDRAMINE HCL 25 MG
50 CAPSULE ORAL ONCE
Start: 2024-11-22 | End: 2024-11-22

## 2024-10-25 RX ORDER — DIPHENHYDRAMINE HCL 25 MG
50 CAPSULE ORAL ONCE
Status: DISCONTINUED | OUTPATIENT
Start: 2024-10-25 | End: 2024-10-26 | Stop reason: HOSPADM

## 2024-10-25 RX ORDER — ACETAMINOPHEN 325 MG/1
325 TABLET ORAL ONCE
OUTPATIENT
Start: 2024-11-22

## 2024-10-25 RX ORDER — EPINEPHRINE 1 MG/ML
0.3 INJECTION, SOLUTION INTRAMUSCULAR; SUBCUTANEOUS ONCE AS NEEDED
Start: 2024-11-22

## 2024-10-25 RX ORDER — DIPHENHYDRAMINE HCL 25 MG
50 CAPSULE ORAL ONCE AS NEEDED
Start: 2024-11-22

## 2024-10-25 RX ORDER — METHYLPREDNISOLONE SODIUM SUCCINATE 125 MG/2ML
50 INJECTION, POWDER, LYOPHILIZED, FOR SOLUTION INTRAMUSCULAR; INTRAVENOUS ONCE AS NEEDED
Start: 2024-11-22

## 2024-10-25 RX ORDER — EPINEPHRINE 1 MG/ML
0.3 INJECTION, SOLUTION INTRAMUSCULAR; SUBCUTANEOUS ONCE AS NEEDED
Status: DISCONTINUED | OUTPATIENT
Start: 2024-10-25 | End: 2024-10-26 | Stop reason: HOSPADM

## 2024-10-25 RX ORDER — PREDNISONE 20 MG/1
40 TABLET ORAL ONCE AS NEEDED
Status: DISCONTINUED | OUTPATIENT
Start: 2024-10-25 | End: 2024-10-26 | Stop reason: HOSPADM

## 2024-10-25 RX ORDER — SODIUM CHLORIDE 0.9 % (FLUSH) 0.9 %
10 SYRINGE (ML) INJECTION AS NEEDED
OUTPATIENT
Start: 2024-10-25

## 2024-10-25 RX ORDER — PREDNISONE 20 MG/1
40 TABLET ORAL ONCE
Status: DISCONTINUED | OUTPATIENT
Start: 2024-10-25 | End: 2024-10-26 | Stop reason: HOSPADM

## 2024-10-25 RX ORDER — NYSTATIN 100000 [USP'U]/ML
500000 SUSPENSION ORAL 4 TIMES DAILY
COMMUNITY

## 2024-10-25 RX ORDER — PREDNISONE 20 MG/1
40 TABLET ORAL ONCE
Start: 2024-11-22 | End: 2024-11-22

## 2024-10-25 RX ORDER — HEPARIN SODIUM (PORCINE) LOCK FLUSH IV SOLN 100 UNIT/ML 100 UNIT/ML
500 SOLUTION INTRAVENOUS AS NEEDED
OUTPATIENT
Start: 2024-10-25

## 2024-10-25 RX ORDER — HEPARIN SODIUM (PORCINE) LOCK FLUSH IV SOLN 100 UNIT/ML 100 UNIT/ML
500 SOLUTION INTRAVENOUS AS NEEDED
Status: DISCONTINUED | OUTPATIENT
Start: 2024-10-25 | End: 2024-10-26 | Stop reason: HOSPADM

## 2024-10-25 RX ORDER — DIPHENHYDRAMINE HCL 25 MG
50 CAPSULE ORAL ONCE AS NEEDED
Status: DISCONTINUED | OUTPATIENT
Start: 2024-10-25 | End: 2024-10-26 | Stop reason: HOSPADM

## 2024-10-25 RX ORDER — METHYLPREDNISOLONE SODIUM SUCCINATE 125 MG/2ML
50 INJECTION, POWDER, LYOPHILIZED, FOR SOLUTION INTRAMUSCULAR; INTRAVENOUS ONCE AS NEEDED
Status: DISCONTINUED | OUTPATIENT
Start: 2024-10-25 | End: 2024-10-26 | Stop reason: HOSPADM

## 2024-10-25 RX ADMIN — IMMUNE GLOBULIN INFUSION (HUMAN) 20 G: 100 INJECTION, SOLUTION INTRAVENOUS; SUBCUTANEOUS at 11:19

## 2024-10-25 RX ADMIN — Medication 10 ML: at 12:39

## 2024-10-25 RX ADMIN — IMMUNE GLOBULIN INFUSION (HUMAN) 20 G: 100 INJECTION, SOLUTION INTRAVENOUS; SUBCUTANEOUS at 09:40

## 2024-10-25 RX ADMIN — HEPARIN 500 UNITS: 100 SYRINGE at 12:39

## 2024-10-25 RX ADMIN — SODIUM CHLORIDE 500 ML: 9 INJECTION, SOLUTION INTRAVENOUS at 09:19

## 2024-10-25 NOTE — OUTREACH NOTE
Prep Survey      Flowsheet Row Responses   [facility] discharged from? Non-[facility]   Is LACE score < 7 ? Non-BH Discharge   Eligibility [facility]   Date of Admission 10/20/24   Date of Discharge 10/24/24   Discharge Disposition Home or Self Care   Discharge diagnosis Vision changes/Optic disc edema/glaucoma   Does the patient have one of the following disease processes/diagnoses(primary or secondary)? Other   Does the patient have Home health ordered? No   Prep survey completed? Yes            KRISTINE DELAROSA - Registered Nurse

## 2024-10-25 NOTE — OUTREACH NOTE
Call Center TCM Note      Flowsheet Row Responses   [facility] patient discharged from? Non-  []   Does the patient have one of the following disease processes/diagnoses(primary or secondary)? Other   TCM attempt successful? Yes   Call start time 1333   Call end time 1338   Discharge diagnosis Vision changes/Optic disc edema/glaucoma   Does the patient have all medications ordered at discharge? Yes   Is the patient taking all medications as directed (includes completed medication regime)? Yes   Comments Scheduled hospital follow up appt with PCP Dr. Rawls for 11/8   Does the patient have an appointment with their PCP within 7-14 days of discharge? Yes   Has home health visited the patient within 72 hours of discharge? N/A   Psychosocial issues? No   What is the patient's perception of their health status since discharge? Improving   Is the patient/caregiver able to teach back signs and symptoms related to disease process for when to call PCP? Yes   Is the patient/caregiver able to teach back signs and symptoms related to disease process for when to call 911? Yes   Is the patient/caregiver able to teach back the hierarchy of who to call/visit for symptoms/problems? PCP, Specialist, Home health nurse, Urgent Care, ED, 911 Yes   If the patient is a current smoker, are they able to teach back resources for cessation? Not a smoker   TCM call completed? Yes   Wrap up additional comments Doing well, all concerns addressed, scheduled hospital follow up appt with PCP for 11/8.   Call end time 1338   Would this patient benefit from a Referral to Amb Social Work? No   Is the patient interested in additional calls from an ambulatory ? No            Mona Grajeda RN    10/25/2024, 13:38 EDT

## 2024-10-25 NOTE — OUTREACH NOTE
Call Center TCM Note      Flowsheet Row Responses   [facility] patient discharged from? Non-  []   Does the patient have one of the following disease processes/diagnoses(primary or secondary)? Other   TCM attempt successful? No   Unsuccessful attempts Attempt 1            Mona Grajeda RN    10/25/2024, 09:12 EDT

## 2024-10-25 NOTE — CODE DOCUMENTATION
0915) Port accessed via sterile technique x1 attempt; flushes easily, + blood flow; patient tolerated well.

## 2024-10-28 DIAGNOSIS — R05.1 ACUTE COUGH: Primary | ICD-10-CM

## 2024-10-28 RX ORDER — ISOSORBIDE MONONITRATE 30 MG/1
30 TABLET, EXTENDED RELEASE ORAL EVERY MORNING
Qty: 90 TABLET | Refills: 0 | Status: SHIPPED | OUTPATIENT
Start: 2024-10-28

## 2024-10-28 RX ORDER — DOXYCYCLINE 100 MG/1
100 CAPSULE ORAL 2 TIMES DAILY
Qty: 20 CAPSULE | Refills: 0 | Status: SHIPPED | OUTPATIENT
Start: 2024-10-28 | End: 2024-10-30

## 2024-10-30 ENCOUNTER — OFFICE VISIT (OUTPATIENT)
Dept: INTERNAL MEDICINE | Facility: CLINIC | Age: 69
End: 2024-10-30
Payer: MEDICARE

## 2024-10-30 ENCOUNTER — HOSPITAL ENCOUNTER (OUTPATIENT)
Dept: GENERAL RADIOLOGY | Facility: HOSPITAL | Age: 69
Discharge: HOME | End: 2024-10-30
Admitting: INTERNAL MEDICINE
Payer: MEDICARE

## 2024-10-30 VITALS
SYSTOLIC BLOOD PRESSURE: 124 MMHG | TEMPERATURE: 97.2 F | HEART RATE: 79 BPM | BODY MASS INDEX: 35.32 KG/M2 | HEIGHT: 65 IN | OXYGEN SATURATION: 98 % | DIASTOLIC BLOOD PRESSURE: 90 MMHG | WEIGHT: 212 LBS

## 2024-10-30 DIAGNOSIS — G25.81 RLS (RESTLESS LEGS SYNDROME): ICD-10-CM

## 2024-10-30 DIAGNOSIS — R05.1 ACUTE COUGH: ICD-10-CM

## 2024-10-30 DIAGNOSIS — J34.89 SINUS DRAINAGE: Primary | ICD-10-CM

## 2024-10-30 DIAGNOSIS — M70.61 GREATER TROCHANTERIC BURSITIS OF RIGHT HIP: ICD-10-CM

## 2024-10-30 LAB
EXPIRATION DATE: ABNORMAL
FLUAV AG UPPER RESP QL IA.RAPID: NOT DETECTED
FLUBV AG UPPER RESP QL IA.RAPID: NOT DETECTED
INTERNAL CONTROL: ABNORMAL
Lab: ABNORMAL
SARS-COV-2 AG UPPER RESP QL IA.RAPID: NOT DETECTED

## 2024-10-30 PROCEDURE — 87428 SARSCOV & INF VIR A&B AG IA: CPT | Performed by: INTERNAL MEDICINE

## 2024-10-30 PROCEDURE — G2211 COMPLEX E/M VISIT ADD ON: HCPCS | Performed by: INTERNAL MEDICINE

## 2024-10-30 PROCEDURE — 3044F HG A1C LEVEL LT 7.0%: CPT | Performed by: INTERNAL MEDICINE

## 2024-10-30 PROCEDURE — 99214 OFFICE O/P EST MOD 30 MIN: CPT | Performed by: INTERNAL MEDICINE

## 2024-10-30 PROCEDURE — 3080F DIAST BP >= 90 MM HG: CPT | Performed by: INTERNAL MEDICINE

## 2024-10-30 PROCEDURE — 1125F AMNT PAIN NOTED PAIN PRSNT: CPT | Performed by: INTERNAL MEDICINE

## 2024-10-30 PROCEDURE — 3074F SYST BP LT 130 MM HG: CPT | Performed by: INTERNAL MEDICINE

## 2024-10-30 PROCEDURE — 71046 X-RAY EXAM CHEST 2 VIEWS: CPT

## 2024-10-30 RX ORDER — DOXYCYCLINE 100 MG/1
100 CAPSULE ORAL 2 TIMES DAILY
Qty: 20 CAPSULE | Refills: 0 | Status: SHIPPED | OUTPATIENT
Start: 2024-10-30

## 2024-10-30 RX ORDER — DULOXETIN HYDROCHLORIDE 60 MG/1
60 CAPSULE, DELAYED RELEASE ORAL DAILY
Qty: 90 CAPSULE | Refills: 3 | Status: SHIPPED | OUTPATIENT
Start: 2024-10-30

## 2024-10-30 NOTE — PROGRESS NOTES
Subjective     Patient ID: Raquel Mariee is a 68 y.o. female. Patient is here for management of multiple medical problems.     Chief Complaint   Patient presents with    vision loss in right eye    URI     History of Present Illness   Leg pain and neuropathy. Off x 2 year. Was good on the peds in past.  Wants back on gabapentin or another treatment.     Eye right sit with vision lost. Partial.   Getting better.        Last covid vaccine last year.      On predinsone for the eye.        The following portions of the patient's history were reviewed and updated as appropriate: allergies, current medications, past family history, past medical history, past social history, past surgical history and problem list.    Review of Systems    Current Outpatient Medications:     albuterol sulfate  (90 Base) MCG/ACT inhaler, Inhale 2 puffs by mouth every 4 to 6 hours as needed for cough, wheeze, shortness of breath. Use with vortex spacer, Disp: 8.5 g, Rfl: 3    allopurinol (ZYLOPRIM) 100 MG tablet, Take 1 tablet by mouth Daily., Disp: 90 tablet, Rfl: 3    betamethasone valerate (VALISONE) 0.1 % cream, Apply topically to the appropriate area as directed Daily. (Patient taking differently: Apply  topically to the appropriate area as directed Daily As Needed.), Disp: 45 g, Rfl: 11    Budeson-Glycopyrrol-Formoterol (Breztri Aerosphere) 160-9-4.8 MCG/ACT aerosol inhaler, Inhale 2 puffs by mouth twice daily. Rinse mouth after use, Disp: 10.7 g, Rfl: 11    calcium carbonate (Antacid Maximum) 1000 MG chewable tablet, Chew 500 mg 3 (Three) Times a Day., Disp: , Rfl:     cholecalciferol (VITAMIN D3) 25 MCG (1000 UT) tablet, Take 1 tablet by mouth Daily., Disp: , Rfl:     cloNIDine (Catapres) 0.1 MG tablet, Take 1 tablet by mouth 2 (Two) Times a Day., Disp: 30 tablet, Rfl: 5    diphenhydrAMINE (Benadryl Allergy) 25 MG tablet, Take 1-2 tablets by mouth Every 4-6 Hours As Needed., Disp: 90 tablet, Rfl: 11    doxycycline  (VIBRAMYCIN) 100 MG capsule, Take 1 capsule by mouth 2 (Two) Times a Day., Disp: 20 capsule, Rfl: 0    estradiol (ESTRACE) 0.1 MG/GM vaginal cream, Insert 0.5 grams vaginally twice weekly, Disp: 42.5 g, Rfl: 5    ferrous sulfate 325 (65 FE) MG EC tablet, Take 2 tablets by mouth Daily With Breakfast., Disp: , Rfl:     fexofenadine (ALLEGRA) 180 MG tablet, Take 1 tablet by mouth Daily., Disp: , Rfl:     Fluticasone Propionate (Xhance) 93 MCG/ACT Exhaler Suspension, Instill 1 spray in both nostrils twice a day, Disp: 16 mL, Rfl: 11    furosemide (LASIX) 40 MG tablet, Take 1 tablet by mouth Daily, twice a month (Patient taking differently: Take 1 tablet by mouth Daily. With infusions), Disp: 30 tablet, Rfl: 1    glucosamine-chondroitin 500-400 MG capsule capsule, Take 1 capsule by mouth 2 (Two) Times a Day With Meals., Disp: , Rfl:     guaifenesin-dextromethorphan 600-30 mg (MUCINEX DM)  MG tablet sustained-release 12 hour, Take 2 tablets by mouth 2 (Two) Times a Day., Disp: 60 tablet, Rfl: 1    Hydrocortisone, Perianal, (Anusol-HC) 2.5 % rectal cream, Apply rectally 2 times daily, Disp: 30 g, Rfl: 2    Hypertonic Nasal Wash (Sinus Rinse Kit) pack, use once or twice daily as needed, Disp: 1 each, Rfl: 3    immune globulin, human, (Gammagard) 30 GM/300ML solution infusion, 40 grams IV every 4 weeks, Disp: 40 mL, Rfl: 11    ipratropium (ATROVENT) 0.06 % nasal spray, Instill 1-2 sprays in each nostril 2-3 times daily as needed, Disp: 15 mL, Rfl: 3    ipratropium-albuterol (DUO-NEB) 0.5-2.5 mg/3 ml nebulizer, Inhale the contents of 1 vial through a nebulizer every 4-6 hours as needed for cough,wheezing and shortness of breath., Disp: 90 mL, Rfl: 3    isosorbide mononitrate (IMDUR) 30 MG 24 hr tablet, Take 1 tablet by mouth Every Morning., Disp: 90 tablet, Rfl: 0    levothyroxine (SYNTHROID, LEVOTHROID) 50 MCG tablet, Take 1 tablet by mouth Daily., Disp: 90 tablet, Rfl: 3    lidocaine (XYLOCAINE) 5 % ointment, Apply 1  Application topically to the appropriate area as directed Every 2 (Two) Hours As Needed for Mild Pain., Disp: 50 g, Rfl: 0    linaclotide (Linzess) 145 MCG capsule capsule, Take 1 capsule by mouth Every Morning Before Breakfast., Disp: 30 capsule, Rfl: 11    Magnesium 250 MG tablet, Take 2 tablets by mouth 2 (Two) Times a Day., Disp: , Rfl:     Mepolizumab (Nucala) 100 MG/ML solution auto-injector, Inject 1 mL under the skin into the appropriate area as directed Every 28 (Twenty-Eight) Days. Obtaining medication from [facility] to Nucala PAP, Disp: , Rfl:     Misc. Devices (Sitz Bath) misc, Use 1 each As Needed (for hemorrhoids)., Disp: 1 each, Rfl: 0    montelukast (SINGULAIR) 10 MG tablet, Take 1 tablet by mouth every night at bedtime., Disp: 90 tablet, Rfl: 3    multivitamin with minerals tablet tablet, Take 1 tablet by mouth Daily., Disp: , Rfl:     omeprazole (priLOSEC) 40 MG capsule, Take 1 capsule by mouth 2 (Two) Times a Day., Disp: 180 capsule, Rfl: 3    ondansetron (ZOFRAN) 4 MG tablet, Take 1 tablet by mouth Every 8 (Eight) Hours As Needed for Nausea or Vomiting., Disp: 30 tablet, Rfl: 11    POTASSIUM CHLORIDE PO, Take 20 mEq by mouth Take As Directed. Takes two times a month with Lasix, Disp: , Rfl:     potassium citrate (UROCIT-K) 10 MEQ (1080 MG) CR tablet, Take 1 tablet by mouth Daily., Disp: 90 tablet, Rfl: 3    tiZANidine (ZANAFLEX) 4 MG tablet, Take 1 tablet by mouth 2 (Two) Times a Day As Needed for Muscle Spasms., Disp: 100 tablet, Rfl: 3    ubrogepant (Ubrelvy) 100 MG tablet, Take 1 tablet by mouth 1 (One) Time As Needed (migraine)., Disp: 10 tablet, Rfl: 0    valsartan (DIOVAN) 160 MG tablet, Take 1 tablet by mouth Daily., Disp: 90 tablet, Rfl: 3    vitamin B-12 (CYANOCOBALAMIN) 1000 MCG tablet, Take 1 tablet by mouth Daily., Disp: , Rfl:     DULoxetine (Cymbalta) 60 MG capsule, Take 1 capsule by mouth Daily., Disp: 90 capsule, Rfl: 3    immune globulin, human, (Gammagard) 20 GM/200ML solution  "infusion, Infuse 40 g into a venous catheter Every 28 (Twenty-Eight) Days., Disp: 400 mL, Rfl: 0    nystatin (MYCOSTATIN) 100,000 unit/mL suspension, Swish and swallow 5 mL 4 (Four) Times a Day. (Patient not taking: Reported on 10/30/2024), Disp: , Rfl:     Objective      Blood pressure 124/90, pulse 79, temperature 97.2 °F (36.2 °C), height 165.1 cm (65\"), weight 96.2 kg (212 lb), SpO2 98%, not currently breastfeeding.            Physical Exam     General Appearance:    Alert, cooperative, no distress, appears stated age   Head:    Normocephalic, without obvious abnormality, atraumatic   Eyes:    PERRL, conjunctiva/corneas clear, EOM's intact   Ears:    Normal TM's and external ear canals, both ears   Nose:   Nares normal, septum midline, mucosa normal, no drainage   or sinus tenderness   Throat:   Lips, mucosa, and tongue normal; teeth and gums normal   Neck:   Supple, symmetrical, trachea midline, no adenopathy;        thyroid:  No enlargement/tenderness/nodules; no carotid    bruit or JVD   Back:     Symmetric, no curvature, ROM normal, no CVA tenderness   Lungs:     Clear to auscultation bilaterally, respirations unlabored   Chest wall:    No tenderness or deformity   Heart:    Regular rate and rhythm, S1 and S2 normal, no murmur,        rub or gallop   Abdomen:     Soft, non-tender, bowel sounds active all four quadrants,     no masses, no organomegaly   Extremities:   Extremities normal, atraumatic, no cyanosis or edema   Pulses:   2+ and symmetric all extremities   Skin:   Skin color, texture, turgor normal, no rashes or lesions   Lymph nodes:   Cervical, supraclavicular, and axillary nodes normal   Neurologic:   CNII-XII intact. Normal strength, sensation and reflexes       throughout      Results for orders placed or performed in visit on 10/30/24   POCT SARS-CoV-2 Antigen HÉCTOR + Flu    Collection Time: 10/30/24 11:51 AM    Specimen: Swab   Result Value Ref Range    SARS Antigen Not Detected Not Detected, " Presumptive Negative    Influenza A Antigen HÉCTOR Not Detected (A) Not Detected    Influenza B Antigen HÉCTOR Not Detected (A) Not Detected    Internal Control Passed Passed    Lot Number [id number]     Expiration Date 04/09/2024      *Note: Due to a large number of results and/or encounters for the requested time period, some results have not been displayed. A complete set of results can be found in Results Review.         Assessment & Plan     Leg pain and neuropathy. Off x 2 year. Was good on the peds in past.  Wants back on gabapentin or another treatment.   On cymbalta for joint pain. Will       Right troch bursitis. On oral steroids. Will let that run out first and consider inj of hip.         Eye right sit with vision lost. Partial.   Getting better.        Last covid vaccine last year.      On predinsone for the eye.    Cxr stable.  Offical read peding.  Will rx doxy for now.         Diagnoses and all orders for this visit:    1. Sinus drainage (Primary)  -     POCT SARS-CoV-2 Antigen HÉCTOR + Flu    2. Greater trochanteric bursitis of right hip    3. RLS (restless legs syndrome)    Other orders  -     doxycycline (VIBRAMYCIN) 100 MG capsule; Take 1 capsule by mouth 2 (Two) Times a Day.  Dispense: 20 capsule; Refill: 0  -     DULoxetine (Cymbalta) 60 MG capsule; Take 1 capsule by mouth Daily.  Dispense: 90 capsule; Refill: 3      Return in about 6 months (around 4/30/2025).          There are no Patient Instructions on file for this visit.     Sanjeev Rawls MD    Assessment & Plan

## 2024-10-31 DIAGNOSIS — J45.50 SEVERE PERSISTENT ASTHMA WITHOUT COMPLICATION: ICD-10-CM

## 2024-10-31 RX ORDER — BUDESONIDE, GLYCOPYRROLATE, AND FORMOTEROL FUMARATE 160; 9; 4.8 UG/1; UG/1; UG/1
AEROSOL, METERED RESPIRATORY (INHALATION)
Qty: 10.7 G | Refills: 11 | Status: SHIPPED | OUTPATIENT
Start: 2024-10-31

## 2024-11-01 NOTE — PROGRESS NOTES
Details      Reading Physician Reading Date Result Priority  Cody Hoff MD  [phone] 11/1/2024 Routine    Narrative & Impression  XR CHEST PA AND LATERAL    Date of Exam: 10/30/2024 11:12 AM EDT    Indication: cough. just got out of hospital.    Comparison: 10/20/2024    Findings:  There is a left subclavian port again noted with the tip near the cavoatrial junction. Linear scarring again noted in the right midlung. There is slight blunting of the posterior CP angles suggesting small amounts of pleural fluid. No pneumothorax   identified. Heart size is again mildly prominent. Pulmonary vascularity appears within normal limits.    IMPRESSION:  Impression:    1. Stable cardiomegaly and pulmonary parenchymal scarring.  2. Small amounts of pleural fluid.      Electronically Signed: Cody Hoff MD    11/1/2024 10:46 AM EDT    Workstation ID: [id number]

## 2024-11-14 ENCOUNTER — PATIENT MESSAGE (OUTPATIENT)
Dept: INTERNAL MEDICINE | Facility: CLINIC | Age: 69
End: 2024-11-14
Payer: MEDICARE

## 2024-11-14 DIAGNOSIS — E11.9 TYPE 2 DIABETES MELLITUS WITHOUT COMPLICATION, WITH LONG-TERM CURRENT USE OF INSULIN: ICD-10-CM

## 2024-11-14 DIAGNOSIS — Z79.4 TYPE 2 DIABETES MELLITUS WITHOUT COMPLICATION, WITH LONG-TERM CURRENT USE OF INSULIN: ICD-10-CM

## 2024-11-14 DIAGNOSIS — J32.0 MAXILLARY SINUSITIS, UNSPECIFIED CHRONICITY: ICD-10-CM

## 2024-11-21 ENCOUNTER — OFFICE VISIT (OUTPATIENT)
Dept: INTERNAL MEDICINE | Facility: CLINIC | Age: 69
End: 2024-11-21
Payer: MEDICARE

## 2024-11-21 ENCOUNTER — TELEPHONE (OUTPATIENT)
Dept: INTERNAL MEDICINE | Facility: CLINIC | Age: 69
End: 2024-11-21

## 2024-11-21 VITALS
WEIGHT: 209 LBS | OXYGEN SATURATION: 95 % | HEART RATE: 73 BPM | TEMPERATURE: 97.3 F | HEIGHT: 65 IN | DIASTOLIC BLOOD PRESSURE: 78 MMHG | SYSTOLIC BLOOD PRESSURE: 124 MMHG | BODY MASS INDEX: 34.82 KG/M2 | RESPIRATION RATE: 12 BRPM

## 2024-11-21 DIAGNOSIS — J45.41 MODERATE PERSISTENT ASTHMA WITH EXACERBATION: Primary | ICD-10-CM

## 2024-11-21 DIAGNOSIS — R05.2 SUBACUTE COUGH: ICD-10-CM

## 2024-11-21 RX ORDER — ACETAMINOPHEN AND CODEINE PHOSPHATE 300; 30 MG/1; MG/1
1 TABLET ORAL EVERY 4 HOURS PRN
Qty: 15 TABLET | Refills: 1 | Status: SHIPPED | OUTPATIENT
Start: 2024-11-21

## 2024-11-21 RX ORDER — DOXYCYCLINE 100 MG/1
100 CAPSULE ORAL 2 TIMES DAILY
Qty: 20 CAPSULE | Refills: 0 | Status: SHIPPED | OUTPATIENT
Start: 2024-11-21

## 2024-11-21 NOTE — TELEPHONE ENCOUNTER
Caller: Raquel Mariee    Relationship: Self    Best call back number: [phone]     What medication are you requesting: DOXYCYCLINE    What are your current symptoms: URI COUGH, ETC    How long have you been experiencing symptoms: OVER 1 WEEK    Have you had these symptoms before:    [x] Yes  [] No    Have you been treated for these symptoms before:   [x] Yes  [] No    If a prescription is needed, what is your preferred pharmacy and phone number: [facility]     Additional notes:  DR STANLEY FORGOT TO CALL THIS IN TODAY.

## 2024-11-21 NOTE — PROGRESS NOTES
Subjective     Patient ID: Raquel Mariee is a 68 y.o. female. Patient is here for management of multiple medical problems.     Chief Complaint   Patient presents with    COPD    Asthma     History of Present Illness   Copd. Cough and using tylenol # 3 for cough. And it works well. Taking in mid of night      Start on pred and azithro.  Promethazine dm didn't help.        The following portions of the patient's history were reviewed and updated as appropriate: allergies, current medications, past family history, past medical history, past social history, past surgical history and problem list.    Review of Systems    Current Outpatient Medications:     albuterol sulfate  (90 Base) MCG/ACT inhaler, Inhale 2 puffs by mouth every 4 to 6 hours as needed for cough, wheeze, shortness of breath. Use with vortex spacer, Disp: 8.5 g, Rfl: 3    allopurinol (ZYLOPRIM) 100 MG tablet, Take 1 tablet by mouth Daily., Disp: 90 tablet, Rfl: 3    azithromycin (ZITHROMAX) 250 MG tablet, Take 2 tablets the first day, then 1 tablet daily for 4 days., Disp: 6 tablet, Rfl: 0    betamethasone valerate (VALISONE) 0.1 % cream, Apply topically to the appropriate area as directed Daily. (Patient taking differently: Apply  topically to the appropriate area as directed Daily As Needed.), Disp: 45 g, Rfl: 11    Budeson-Glycopyrrol-Formoterol (Breztri Aerosphere) 160-9-4.8 MCG/ACT aerosol inhaler, Inhale 2 puffs by mouth twice daily. Rinse mouth after use, Disp: 10.7 g, Rfl: 11    buPROPion SR (WELLBUTRIN SR) 150 MG 12 hr tablet, Take 1 tablet by mouth 2 (Two) Times a Day., Disp: 180 tablet, Rfl: 3    calcium carbonate (Antacid Maximum) 1000 MG chewable tablet, Chew 500 mg 3 (Three) Times a Day., Disp: , Rfl:     cholecalciferol (VITAMIN D3) 25 MCG (1000 UT) tablet, Take 1 tablet by mouth Daily., Disp: , Rfl:     cloNIDine (Catapres) 0.1 MG tablet, Take 1 tablet by mouth 2 (Two) Times a Day., Disp: 30 tablet, Rfl: 5    diphenhydrAMINE  (Benadryl Allergy) 25 MG tablet, Take 1-2 tablets by mouth Every 4-6 Hours As Needed., Disp: 90 tablet, Rfl: 11    DULoxetine (Cymbalta) 60 MG capsule, Take 1 capsule by mouth Daily., Disp: 90 capsule, Rfl: 3    estradiol (ESTRACE) 0.1 MG/GM vaginal cream, Insert 0.5 grams vaginally twice weekly, Disp: 42.5 g, Rfl: 5    ferrous sulfate 325 (65 FE) MG EC tablet, Take 2 tablets by mouth Daily With Breakfast., Disp: , Rfl:     fexofenadine (ALLEGRA) 180 MG tablet, Take 1 tablet by mouth Daily., Disp: , Rfl:     Fluticasone Propionate (Xhance) 93 MCG/ACT Exhaler Suspension, Instill 1 spray in both nostrils twice a day, Disp: 16 mL, Rfl: 11    furosemide (LASIX) 40 MG tablet, Take 1 tablet by mouth Daily, twice a month (Patient taking differently: Take 1 tablet by mouth Daily. With infusions), Disp: 30 tablet, Rfl: 1    glucosamine-chondroitin 500-400 MG capsule capsule, Take 1 capsule by mouth 2 (Two) Times a Day With Meals., Disp: , Rfl:     guaifenesin-dextromethorphan 600-30 mg (MUCINEX DM)  MG tablet sustained-release 12 hour, Take 2 tablets by mouth 2 (Two) Times a Day., Disp: 60 tablet, Rfl: 1    Hydrocortisone, Perianal, (Anusol-HC) 2.5 % rectal cream, Apply rectally 2 times daily, Disp: 30 g, Rfl: 2    Hypertonic Nasal Wash (Sinus Rinse Kit) pack, use once or twice daily as needed, Disp: 1 each, Rfl: 3    immune globulin, human, (Gammagard) 20 GM/200ML solution infusion, Infuse 40 g into a venous catheter Every 28 (Twenty-Eight) Days., Disp: 400 mL, Rfl: 0    immune globulin, human, (Gammagard) 30 GM/300ML solution infusion, 40 grams IV every 4 weeks, Disp: 40 mL, Rfl: 11    ipratropium (ATROVENT) 0.06 % nasal spray, Instill 1-2 sprays in each nostril 2-3 times daily as needed, Disp: 15 mL, Rfl: 3    ipratropium-albuterol (DUO-NEB) 0.5-2.5 mg/3 ml nebulizer, Inhale the contents of 1 vial through a nebulizer every 4-6 hours as needed for cough,wheezing and shortness of breath., Disp: 90 mL, Rfl: 3     isosorbide mononitrate (IMDUR) 30 MG 24 hr tablet, Take 1 tablet by mouth Every Morning., Disp: 90 tablet, Rfl: 0    levothyroxine (SYNTHROID, LEVOTHROID) 50 MCG tablet, Take 1 tablet by mouth Daily., Disp: 90 tablet, Rfl: 3    lidocaine (XYLOCAINE) 5 % ointment, Apply 1 Application topically to the appropriate area as directed Every 2 (Two) Hours As Needed for Mild Pain., Disp: 50 g, Rfl: 0    linaclotide (Linzess) 145 MCG capsule capsule, Take 1 capsule by mouth Every Morning Before Breakfast., Disp: 30 capsule, Rfl: 11    Magnesium 250 MG tablet, Take 2 tablets by mouth 2 (Two) Times a Day., Disp: , Rfl:     Mepolizumab (Nucala) 100 MG/ML solution auto-injector, Inject 1 mL under the skin into the appropriate area as directed Every 28 (Twenty-Eight) Days. Obtaining medication from [facility] to Nucala PAP, Disp: , Rfl:     methylPREDNISolone (MEDROL) 4 MG dose pack, Take as directed on package instructions., Disp: 21 tablet, Rfl: 0    Misc. Devices (Sitz Bath) misc, Use 1 each As Needed (for hemorrhoids)., Disp: 1 each, Rfl: 0    montelukast (SINGULAIR) 10 MG tablet, Take 1 tablet by mouth every night at bedtime., Disp: 90 tablet, Rfl: 3    multivitamin with minerals tablet tablet, Take 1 tablet by mouth Daily., Disp: , Rfl:     nystatin (MYCOSTATIN) 100,000 unit/mL suspension, Swish and swallow 5 mL 4 (Four) Times a Day., Disp: , Rfl:     omeprazole (priLOSEC) 40 MG capsule, Take 1 capsule by mouth 2 (Two) Times a Day., Disp: 180 capsule, Rfl: 3    ondansetron (ZOFRAN) 4 MG tablet, Take 1 tablet by mouth Every 8 (Eight) Hours As Needed for Nausea or Vomiting., Disp: 30 tablet, Rfl: 11    POTASSIUM CHLORIDE PO, Take 20 mEq by mouth Take As Directed. Takes two times a month with Lasix, Disp: , Rfl:     potassium citrate (UROCIT-K) 10 MEQ (1080 MG) CR tablet, Take 1 tablet by mouth Daily., Disp: 90 tablet, Rfl: 3    tiZANidine (ZANAFLEX) 4 MG tablet, Take 1 tablet by mouth 2 (Two) Times a Day As Needed for Muscle  "Spasms., Disp: 100 tablet, Rfl: 3    ubrogepant (Ubrelvy) 100 MG tablet, Take 1 tablet by mouth 1 (One) Time As Needed (migraine)., Disp: 4 tablet, Rfl: 0    valsartan (DIOVAN) 160 MG tablet, Take 1 tablet by mouth Daily., Disp: 90 tablet, Rfl: 3    vitamin B-12 (CYANOCOBALAMIN) 1000 MCG tablet, Take 1 tablet by mouth Daily., Disp: , Rfl:     acetaminophen-codeine (TYLENOL with CODEINE #3) 300-30 MG per tablet, Take 1 tablet by mouth Every 4 (Four) Hours As Needed for Moderate Pain., Disp: 15 tablet, Rfl: 1    Objective      Blood pressure 124/78, pulse 73, temperature 97.3 °F (36.3 °C), resp. rate 12, height 165.1 cm (65\"), weight 94.8 kg (209 lb), SpO2 95%, not currently breastfeeding.            Physical Exam     General Appearance:    Alert, cooperative, no distress, appears stated age   Head:    Normocephalic, without obvious abnormality, atraumatic   Eyes:    PERRL, conjunctiva/corneas clear, EOM's intact   Ears:    Normal TM's and external ear canals, both ears   Nose:   Nares normal, septum midline, mucosa normal, no drainage   or sinus tenderness   Throat:   Lips, mucosa, and tongue normal; teeth and gums normal   Neck:   Supple, symmetrical, trachea midline, no adenopathy;        thyroid:  No enlargement/tenderness/nodules; no carotid    bruit or JVD   Back:     Symmetric, no curvature, ROM normal, no CVA tenderness   Lungs:     \course bronchial bs and wheesing in upper chest only.   to auscultation bilaterally, respirations unlabored   Chest wall:    No tenderness or deformity   Heart:    Regular rate and rhythm, S1 and S2 normal, no murmur,        rub or gallop   Abdomen:     Soft, non-tender, bowel sounds active all four quadrants,     no masses, no organomegaly   Extremities:   Extremities normal, atraumatic, no cyanosis or edema   Pulses:   2+ and symmetric all extremities   Skin:   Skin color, texture, turgor normal, no rashes or lesions   Lymph nodes:   Cervical, supraclavicular, and axillary nodes " normal   Neurologic:   CNII-XII intact. Normal strength, sensation and reflexes       throughout      Results for orders placed or performed in visit on 10/30/24   POCT SARS-CoV-2 Antigen HÉCTOR + Flu    Collection Time: 10/30/24 11:51 AM    Specimen: Swab   Result Value Ref Range    SARS Antigen Not Detected Not Detected, Presumptive Negative    Influenza A Antigen HÉCTOR Not Detected (A) Not Detected    Influenza B Antigen HÉCTOR Not Detected (A) Not Detected    Internal Control Passed Passed    Lot Number [id number]     Expiration Date 04/09/2024      *Note: Due to a large number of results and/or encounters for the requested time period, some results have not been displayed. A complete set of results can be found in Results Review.         Assessment & Plan     Cough getting better.    Pt just had azith.  Will rf doxy. [place] still a bit wet and wheezing.            Diagnoses and all orders for this visit:    1. Moderate persistent asthma with exacerbation (Primary)  -     acetaminophen-codeine (TYLENOL with CODEINE #3) 300-30 MG per tablet; Take 1 tablet by mouth Every 4 (Four) Hours As Needed for Moderate Pain.  Dispense: 15 tablet; Refill: 1    2. Subacute cough  -     acetaminophen-codeine (TYLENOL with CODEINE #3) 300-30 MG per tablet; Take 1 tablet by mouth Every 4 (Four) Hours As Needed for Moderate Pain.  Dispense: 15 tablet; Refill: 1    Has f/u in February.    No follow-ups on file.          There are no Patient Instructions on file for this visit.     Sanjeev Rawls MD    Assessment & Plan

## 2024-11-25 ENCOUNTER — HOSPITAL ENCOUNTER (OUTPATIENT)
Facility: HOSPITAL | Age: 69
Discharge: HOME | End: 2024-11-25
Admitting: ALLERGY & IMMUNOLOGY
Payer: MEDICARE

## 2024-11-25 VITALS
DIASTOLIC BLOOD PRESSURE: 68 MMHG | SYSTOLIC BLOOD PRESSURE: 136 MMHG | TEMPERATURE: 98.4 F | OXYGEN SATURATION: 96 % | WEIGHT: 219.8 LBS | BODY MASS INDEX: 36.58 KG/M2 | HEART RATE: 84 BPM | RESPIRATION RATE: 14 BRPM

## 2024-11-25 DIAGNOSIS — Z95.828 PORT-A-CATH IN PLACE: Primary | ICD-10-CM

## 2024-11-25 DIAGNOSIS — D80.1 COMMON VARIABLE AGAMMAGLOBULINEMIA: ICD-10-CM

## 2024-11-25 PROCEDURE — 96365 THER/PROPH/DIAG IV INF INIT: CPT

## 2024-11-25 PROCEDURE — 25810000003 SODIUM CHLORIDE 0.9 % SOLUTION: Performed by: ALLERGY & IMMUNOLOGY

## 2024-11-25 PROCEDURE — 96361 HYDRATE IV INFUSION ADD-ON: CPT

## 2024-11-25 PROCEDURE — 25010000002 HEPARIN LOCK FLUSH PER 10 UNITS: Performed by: ALLERGY & IMMUNOLOGY

## 2024-11-25 PROCEDURE — 96366 THER/PROPH/DIAG IV INF ADDON: CPT

## 2024-11-25 PROCEDURE — 25010000002 IMMUNE GLOBULIN (HUMAN) 20 GM/200ML SOLUTION: Performed by: ALLERGY & IMMUNOLOGY

## 2024-11-25 RX ORDER — HEPARIN SODIUM (PORCINE) LOCK FLUSH IV SOLN 100 UNIT/ML 100 UNIT/ML
500 SOLUTION INTRAVENOUS AS NEEDED
Status: DISCONTINUED | OUTPATIENT
Start: 2024-11-25 | End: 2024-11-26 | Stop reason: HOSPADM

## 2024-11-25 RX ORDER — PREDNISONE 20 MG/1
40 TABLET ORAL ONCE AS NEEDED
Status: DISCONTINUED | OUTPATIENT
Start: 2024-11-25 | End: 2024-11-26 | Stop reason: HOSPADM

## 2024-11-25 RX ORDER — SODIUM CHLORIDE 0.9 % (FLUSH) 0.9 %
10 SYRINGE (ML) INJECTION AS NEEDED
OUTPATIENT
Start: 2024-11-25

## 2024-11-25 RX ORDER — DIPHENHYDRAMINE HCL 25 MG
50 CAPSULE ORAL ONCE
Start: 2024-12-16 | End: 2024-12-16

## 2024-11-25 RX ORDER — ACETAMINOPHEN 325 MG/1
325 TABLET ORAL ONCE
OUTPATIENT
Start: 2024-12-16

## 2024-11-25 RX ORDER — PREDNISONE 20 MG/1
40 TABLET ORAL ONCE
Start: 2024-12-16 | End: 2024-12-16

## 2024-11-25 RX ORDER — PREDNISONE 20 MG/1
40 TABLET ORAL ONCE AS NEEDED
Start: 2024-12-16

## 2024-11-25 RX ORDER — EPINEPHRINE 1 MG/ML
0.3 INJECTION, SOLUTION INTRAMUSCULAR; SUBCUTANEOUS ONCE AS NEEDED
Status: DISCONTINUED | OUTPATIENT
Start: 2024-11-25 | End: 2024-11-26 | Stop reason: HOSPADM

## 2024-11-25 RX ORDER — DIPHENHYDRAMINE HCL 25 MG
50 CAPSULE ORAL ONCE AS NEEDED
Start: 2024-12-16

## 2024-11-25 RX ORDER — DIPHENHYDRAMINE HCL 25 MG
50 CAPSULE ORAL ONCE
Status: DISCONTINUED | OUTPATIENT
Start: 2024-11-25 | End: 2024-11-26 | Stop reason: HOSPADM

## 2024-11-25 RX ORDER — PREDNISONE 20 MG/1
40 TABLET ORAL ONCE
Status: DISCONTINUED | OUTPATIENT
Start: 2024-11-25 | End: 2024-11-26 | Stop reason: HOSPADM

## 2024-11-25 RX ORDER — ACETAMINOPHEN 325 MG/1
325 TABLET ORAL ONCE
Status: DISCONTINUED | OUTPATIENT
Start: 2024-11-25 | End: 2024-11-26 | Stop reason: HOSPADM

## 2024-11-25 RX ORDER — DIPHENHYDRAMINE HCL 25 MG
50 CAPSULE ORAL ONCE AS NEEDED
Status: DISCONTINUED | OUTPATIENT
Start: 2024-11-25 | End: 2024-11-26 | Stop reason: HOSPADM

## 2024-11-25 RX ORDER — METHYLPREDNISOLONE SODIUM SUCCINATE 125 MG/2ML
50 INJECTION, POWDER, LYOPHILIZED, FOR SOLUTION INTRAMUSCULAR; INTRAVENOUS ONCE AS NEEDED
Status: DISCONTINUED | OUTPATIENT
Start: 2024-11-25 | End: 2024-11-26 | Stop reason: HOSPADM

## 2024-11-25 RX ORDER — SODIUM CHLORIDE 0.9 % (FLUSH) 0.9 %
10 SYRINGE (ML) INJECTION AS NEEDED
Status: DISCONTINUED | OUTPATIENT
Start: 2024-11-25 | End: 2024-11-26 | Stop reason: HOSPADM

## 2024-11-25 RX ORDER — METHYLPREDNISOLONE SODIUM SUCCINATE 125 MG/2ML
50 INJECTION, POWDER, LYOPHILIZED, FOR SOLUTION INTRAMUSCULAR; INTRAVENOUS ONCE AS NEEDED
Start: 2024-12-16

## 2024-11-25 RX ORDER — HEPARIN SODIUM (PORCINE) LOCK FLUSH IV SOLN 100 UNIT/ML 100 UNIT/ML
500 SOLUTION INTRAVENOUS AS NEEDED
OUTPATIENT
Start: 2024-11-25

## 2024-11-25 RX ORDER — EPINEPHRINE 1 MG/ML
0.3 INJECTION, SOLUTION INTRAMUSCULAR; SUBCUTANEOUS ONCE AS NEEDED
Start: 2024-12-16

## 2024-11-25 RX ADMIN — Medication 10 ML: at 12:43

## 2024-11-25 RX ADMIN — SODIUM CHLORIDE 500 ML: 9 INJECTION, SOLUTION INTRAVENOUS at 08:52

## 2024-11-25 RX ADMIN — HEPARIN 500 UNITS: 100 SYRINGE at 12:43

## 2024-11-25 RX ADMIN — IMMUNE GLOBULIN INFUSION (HUMAN) 20 G: 100 INJECTION, SOLUTION INTRAVENOUS; SUBCUTANEOUS at 11:18

## 2024-11-25 RX ADMIN — IMMUNE GLOBULIN INFUSION (HUMAN) 20 G: 100 INJECTION, SOLUTION INTRAVENOUS; SUBCUTANEOUS at 09:26

## 2024-12-11 DIAGNOSIS — E11.9 TYPE 2 DIABETES MELLITUS WITHOUT COMPLICATION, WITH LONG-TERM CURRENT USE OF INSULIN: ICD-10-CM

## 2024-12-11 DIAGNOSIS — Z79.4 TYPE 2 DIABETES MELLITUS WITHOUT COMPLICATION, WITH LONG-TERM CURRENT USE OF INSULIN: ICD-10-CM

## 2024-12-11 DIAGNOSIS — J32.0 MAXILLARY SINUSITIS, UNSPECIFIED CHRONICITY: ICD-10-CM

## 2024-12-18 DIAGNOSIS — F51.01 PRIMARY INSOMNIA: ICD-10-CM

## 2024-12-18 RX ORDER — CLONIDINE HYDROCHLORIDE 0.1 MG/1
0.1 TABLET ORAL 2 TIMES DAILY
Qty: 180 TABLET | Refills: 1 | Status: SHIPPED | OUTPATIENT
Start: 2024-12-18

## 2024-12-27 ENCOUNTER — HOSPITAL ENCOUNTER (OUTPATIENT)
Facility: HOSPITAL | Age: 69
Discharge: HOME | End: 2024-12-27
Payer: MEDICARE

## 2024-12-27 VITALS
RESPIRATION RATE: 16 BRPM | TEMPERATURE: 98.4 F | OXYGEN SATURATION: 98 % | SYSTOLIC BLOOD PRESSURE: 134 MMHG | DIASTOLIC BLOOD PRESSURE: 95 MMHG | BODY MASS INDEX: 36.91 KG/M2 | WEIGHT: 221.8 LBS | HEART RATE: 86 BPM

## 2024-12-27 DIAGNOSIS — Z95.828 PORT-A-CATH IN PLACE: Primary | ICD-10-CM

## 2024-12-27 DIAGNOSIS — D80.1 COMMON VARIABLE AGAMMAGLOBULINEMIA: ICD-10-CM

## 2024-12-27 PROCEDURE — 96361 HYDRATE IV INFUSION ADD-ON: CPT

## 2024-12-27 PROCEDURE — 25810000003 SODIUM CHLORIDE 0.9 % SOLUTION: Performed by: ALLERGY & IMMUNOLOGY

## 2024-12-27 PROCEDURE — 96366 THER/PROPH/DIAG IV INF ADDON: CPT

## 2024-12-27 PROCEDURE — 96365 THER/PROPH/DIAG IV INF INIT: CPT

## 2024-12-27 PROCEDURE — 25010000002 HEPARIN LOCK FLUSH PER 10 UNITS: Performed by: ALLERGY & IMMUNOLOGY

## 2024-12-27 PROCEDURE — 25010000002 IMMUNE GLOBULIN (HUMAN) 20 GM/200ML SOLUTION: Performed by: ALLERGY & IMMUNOLOGY

## 2024-12-27 RX ORDER — SODIUM CHLORIDE 0.9 % (FLUSH) 0.9 %
10 SYRINGE (ML) INJECTION AS NEEDED
Status: DISCONTINUED | OUTPATIENT
Start: 2024-12-27 | End: 2024-12-28 | Stop reason: HOSPADM

## 2024-12-27 RX ORDER — ACETAMINOPHEN 325 MG/1
325 TABLET ORAL ONCE
OUTPATIENT
Start: 2025-01-24

## 2024-12-27 RX ORDER — EPINEPHRINE 1 MG/ML
0.3 INJECTION, SOLUTION INTRAMUSCULAR; SUBCUTANEOUS ONCE AS NEEDED
Start: 2025-01-24

## 2024-12-27 RX ORDER — HEPARIN SODIUM (PORCINE) LOCK FLUSH IV SOLN 100 UNIT/ML 100 UNIT/ML
500 SOLUTION INTRAVENOUS AS NEEDED
Status: DISCONTINUED | OUTPATIENT
Start: 2024-12-27 | End: 2024-12-28 | Stop reason: HOSPADM

## 2024-12-27 RX ORDER — EPINEPHRINE 1 MG/ML
0.3 INJECTION, SOLUTION INTRAMUSCULAR; SUBCUTANEOUS ONCE AS NEEDED
Status: DISCONTINUED | OUTPATIENT
Start: 2024-12-27 | End: 2024-12-28 | Stop reason: HOSPADM

## 2024-12-27 RX ORDER — DIPHENHYDRAMINE HCL 25 MG
50 CAPSULE ORAL ONCE AS NEEDED
Status: DISCONTINUED | OUTPATIENT
Start: 2024-12-27 | End: 2024-12-28 | Stop reason: HOSPADM

## 2024-12-27 RX ORDER — METHYLPREDNISOLONE SODIUM SUCCINATE 125 MG/2ML
50 INJECTION, POWDER, LYOPHILIZED, FOR SOLUTION INTRAMUSCULAR; INTRAVENOUS ONCE AS NEEDED
Status: DISCONTINUED | OUTPATIENT
Start: 2024-12-27 | End: 2024-12-28 | Stop reason: HOSPADM

## 2024-12-27 RX ORDER — PREDNISONE 20 MG/1
40 TABLET ORAL ONCE AS NEEDED
Start: 2025-01-24

## 2024-12-27 RX ORDER — ACETAMINOPHEN 325 MG/1
325 TABLET ORAL ONCE
Status: DISCONTINUED | OUTPATIENT
Start: 2024-12-27 | End: 2024-12-28 | Stop reason: HOSPADM

## 2024-12-27 RX ORDER — PREDNISONE 20 MG/1
40 TABLET ORAL ONCE
Status: DISCONTINUED | OUTPATIENT
Start: 2024-12-27 | End: 2024-12-28 | Stop reason: HOSPADM

## 2024-12-27 RX ORDER — SODIUM CHLORIDE 0.9 % (FLUSH) 0.9 %
10 SYRINGE (ML) INJECTION AS NEEDED
OUTPATIENT
Start: 2024-12-27

## 2024-12-27 RX ORDER — DIPHENHYDRAMINE HCL 25 MG
50 CAPSULE ORAL ONCE
Start: 2025-01-24 | End: 2025-01-24

## 2024-12-27 RX ORDER — HEPARIN SODIUM (PORCINE) LOCK FLUSH IV SOLN 100 UNIT/ML 100 UNIT/ML
500 SOLUTION INTRAVENOUS AS NEEDED
OUTPATIENT
Start: 2024-12-27

## 2024-12-27 RX ORDER — METHYLPREDNISOLONE SODIUM SUCCINATE 125 MG/2ML
50 INJECTION, POWDER, LYOPHILIZED, FOR SOLUTION INTRAMUSCULAR; INTRAVENOUS ONCE AS NEEDED
Start: 2025-01-24

## 2024-12-27 RX ORDER — DIPHENHYDRAMINE HCL 25 MG
50 CAPSULE ORAL ONCE
Status: DISCONTINUED | OUTPATIENT
Start: 2024-12-27 | End: 2024-12-28 | Stop reason: HOSPADM

## 2024-12-27 RX ORDER — PREDNISONE 20 MG/1
40 TABLET ORAL ONCE
Start: 2025-01-24 | End: 2025-01-24

## 2024-12-27 RX ORDER — PREDNISONE 20 MG/1
40 TABLET ORAL ONCE AS NEEDED
Status: DISCONTINUED | OUTPATIENT
Start: 2024-12-27 | End: 2024-12-28 | Stop reason: HOSPADM

## 2024-12-27 RX ORDER — DIPHENHYDRAMINE HCL 25 MG
50 CAPSULE ORAL ONCE AS NEEDED
Start: 2025-01-24

## 2024-12-27 RX ADMIN — Medication 10 ML: at 12:48

## 2024-12-27 RX ADMIN — IMMUNE GLOBULIN INFUSION (HUMAN) 20 G: 100 INJECTION, SOLUTION INTRAVENOUS; SUBCUTANEOUS at 09:30

## 2024-12-27 RX ADMIN — HEPARIN 500 UNITS: 100 SYRINGE at 12:48

## 2024-12-27 RX ADMIN — SODIUM CHLORIDE 500 ML: 9 INJECTION, SOLUTION INTRAVENOUS at 08:38

## 2024-12-27 RX ADMIN — IMMUNE GLOBULIN INFUSION (HUMAN) 20 G: 100 INJECTION, SOLUTION INTRAVENOUS; SUBCUTANEOUS at 11:20

## 2025-01-09 ENCOUNTER — PATIENT MESSAGE (OUTPATIENT)
Dept: INTERNAL MEDICINE | Facility: CLINIC | Age: 70
End: 2025-01-09
Payer: MEDICARE

## 2025-01-10 DIAGNOSIS — E11.9 TYPE 2 DIABETES MELLITUS WITHOUT COMPLICATION, WITH LONG-TERM CURRENT USE OF INSULIN: ICD-10-CM

## 2025-01-10 DIAGNOSIS — J32.0 MAXILLARY SINUSITIS, UNSPECIFIED CHRONICITY: ICD-10-CM

## 2025-01-10 DIAGNOSIS — Z79.4 TYPE 2 DIABETES MELLITUS WITHOUT COMPLICATION, WITH LONG-TERM CURRENT USE OF INSULIN: ICD-10-CM

## 2025-01-14 DIAGNOSIS — E11.9 TYPE 2 DIABETES MELLITUS WITHOUT COMPLICATION, WITH LONG-TERM CURRENT USE OF INSULIN: ICD-10-CM

## 2025-01-14 DIAGNOSIS — Z79.4 TYPE 2 DIABETES MELLITUS WITHOUT COMPLICATION, WITH LONG-TERM CURRENT USE OF INSULIN: ICD-10-CM

## 2025-01-17 ENCOUNTER — OFFICE VISIT (OUTPATIENT)
Dept: PULMONOLOGY | Facility: CLINIC | Age: 70
End: 2025-01-17
Payer: MEDICARE

## 2025-01-17 VITALS
HEIGHT: 65 IN | OXYGEN SATURATION: 97 % | WEIGHT: 215 LBS | HEART RATE: 91 BPM | DIASTOLIC BLOOD PRESSURE: 82 MMHG | BODY MASS INDEX: 35.82 KG/M2 | SYSTOLIC BLOOD PRESSURE: 138 MMHG

## 2025-01-17 DIAGNOSIS — J45.50 SEVERE PERSISTENT ASTHMA WITHOUT COMPLICATION: Primary | ICD-10-CM

## 2025-01-17 PROCEDURE — 3079F DIAST BP 80-89 MM HG: CPT | Performed by: NURSE PRACTITIONER

## 2025-01-17 PROCEDURE — 3075F SYST BP GE 130 - 139MM HG: CPT | Performed by: NURSE PRACTITIONER

## 2025-01-17 PROCEDURE — 99213 OFFICE O/P EST LOW 20 MIN: CPT | Performed by: NURSE PRACTITIONER

## 2025-01-17 RX ORDER — MEPOLIZUMAB 100 MG/ML
100 INJECTION, SOLUTION SUBCUTANEOUS
Qty: 1 ML | Status: CANCELLED | OUTPATIENT
Start: 2025-01-17

## 2025-01-17 RX ORDER — PREDNISONE 20 MG/1
40 TABLET ORAL DAILY
Qty: 10 TABLET | Refills: 0 | Status: SHIPPED | OUTPATIENT
Start: 2025-01-17

## 2025-01-17 NOTE — PROGRESS NOTES
"     Follow Up Office Visit      Patient Name: Raquel Mariee    Chief Complaint:    Chief Complaint   Patient presents with    Breathing Problem       History of Present Illness: Raquel Mariee is a 69 y.o. female who is here today for follow up of asthma. Since last, she continues on Breztri and Nucala.  She notes that her current regimen has improved symptoms, reducing frequency of asthma exacerbations.  She notes that she needs to be \"requalified\" for Nucala, and needs to begin getting her inhaler through her local pharmacy rather than from "[employer] due to changes with her insurance benefits.  Rare short acting beta agonist use.  Dyspnea stable. She continues on IVIG.  She notes that about 3 months ago she lost some vision in her right eye and is undergoing testing for MS.    Associated Symptoms: Occasional cough minimally productive of mucus, no current wheezing  Supplemental Oxygen: No  Exacerbations per year: 3-4    Subjective      Review of Systems:  Review of Systems   Constitutional:  Negative for fever and unexpected weight change.   Respiratory:  Positive for cough and shortness of breath. Negative for wheezing.    Cardiovascular:  Negative for chest pain and leg swelling.        Past Medical History:   Past Medical History:   Diagnosis Date    Anxiety     Aortic valve stenosis     Cardiac murmur     Cataract, bilateral     CHF (congestive heart failure)     Chronic kidney disease     Stage III    Clostridium difficile infection     in her 30s    Common variable immunodeficiency     IVIG infusions    COPD (chronic obstructive pulmonary disease)     Depression     Diabetes mellitus     type II    Eosinophilic asthma     Fibromyalgia, primary     Full dentures     GERD (gastroesophageal reflux disease)     History of transfusion     in 1979 at [facility].  No reaction noted, patient states she does have an antibody from transfusion.    Hypertension     Hypogammaglobulinemia     " Hypothyroidism     Irritable bowel syndrome     Migraines     Morbid obesity     Optic neuritis     Optic neuropathy     Osteoarthritis     Prinzmetal angina 1990    Pseudomonas infection 1998    lungs    PTSD (post-traumatic stress disorder)     Pulmonary edema     Renal insufficiency     Sinus tachycardia 1990    Sleep apnea     no longer uses/needs cpap    Tachycardia     TIA (transient ischemic attack) 2017    Trochanteric bursitis 01/25/2023       Past Surgical History:   Past Surgical History:   Procedure Laterality Date    APPENDECTOMY      BUNIONECTOMY Bilateral     CARDIAC CATHETERIZATION  2017    no stents needed    CARPAL TUNNEL RELEASE Right     COLONOSCOPY  2021    ENDOMETRIAL ABLATION  1997    EYE SURGERY  ? About 6-8 years ago    Laser for glaucoma    FRACTURE SURGERY  1995    No hardware; Tibeal plateau    GASTRIC BYPASS      HAND SURGERY Left     No hardware    HEMORRHOIDECTOMY N/A 04/09/2024    Procedure: HEMORRHOID BANDING X2;  Surgeon: Melissa Beck MD;  Location: [facility] OR;  Service: General;  Laterality: N/A;    INTERSTIM PLACEMENT N/A 05/03/2023    Procedure: INTERSTIM STAGES 1 AND 2 LEAD AND GENERATOR PLACEMENT;  Surgeon: Abner Nation MD;  Location: [facility] OR;  Service: Urology;  Laterality: N/A;    POSTERIOR VAGINAL REPAIR N/A 08/02/2023    Procedure: POSTERIOR COLPORRHAPHY;  Surgeon: Kellen Galan MD;  Location: [facility] OR;  Service: Gynecology;  Laterality: N/A;    RECTAL EXAMINATION UNDER ANESTHESIA N/A 04/09/2024    Procedure: RECTAL EXAM UNDER ANESTHESIA;  Surgeon: Melissa Beck MD;  Location: [facility] OR;  Service: General;  Laterality: N/A;    REPLACEMENT TOTAL KNEE BILATERAL      TEETH EXTRACTION      all of bottom teeth removed    THORACOTOMY      TONSILLECTOMY      TOTAL ABDOMINAL HYSTERECTOMY WITH SALPINGO OOPHORECTOMY      TRACHEAL SURGERY      Repaired via thoracotomy    TUBAL ABDOMINAL LIGATION  1983    VENOUS ACCESS DEVICE (PORT) INSERTION      port a  cath in the left chest wall       Family History:   Family History   Problem Relation Age of Onset    Diabetes Mother     Stroke Mother     Heart disease Mother     Hypertension Mother     Endometriosis Mother     Depression Mother     Diabetes Father     Hypertension Father     Stroke Father     Heart attack Father     Asthma Father     COPD Father     Dementia Father     Heart disease Father     COPD Sister     Asthma Sister     Cancer Sister         Nasal cell skin    Brain cancer Sister     Diabetes Sister     Stroke Sister     Heart attack Sister     Endometriosis Sister     Depression Sister     Arthritis Sister         Rheumatoid    Hypertension Sister     Kidney disease Sister     Diabetes Sister     COPD Sister     Asthma Sister     Endometriosis Sister     Depression Sister     Cancer Sister         Glioma    Heart disease Sister     Heart attack Sister     Endometriosis Sister     Asthma Sister     Hypertension Sister     Kidney disease Sister         ESRD    COPD Brother     Asthma Brother         Turned into COPD    Diabetes Brother     Asthma Brother     COPD Brother     Diabetes Brother     Hypertension Brother     Asthma Daughter     Endometriosis Daughter     Osteoarthritis Daughter     Hypertension Daughter     Kidney failure Daughter     Irritable bowel syndrome Daughter     Anxiety disorder Daughter     Bipolar disorder Daughter     Depression Daughter     Self-Injurious Behavior  Daughter     Suicide Attempts Daughter     Mental illness Daughter         Bipolar and eating fisorder    Asthma Daughter     Endometriosis Daughter     Osteoarthritis Daughter     Asthma Son     ADD / ADHD Son     Alcohol abuse Son     Drug abuse Son     Breast cancer Maternal Cousin     Heart disease Maternal Grandfather     Heart disease Maternal Uncle     OCD Neg Hx     Paranoid behavior Neg Hx     Schizophrenia Neg Hx     Seizures Neg Hx     Ovarian cancer Neg Hx        Social History:   Social History      Socioeconomic History    Marital status:    Tobacco Use    Smoking status: Never     Passive exposure: Never    Smokeless tobacco: Never   Vaping Use    Vaping status: Never Used   Substance and Sexual Activity    Alcohol use: Yes     Comment: ONCE A YEAR    Drug use: Never    Sexual activity: Defer       Current Medications:     Current Outpatient Medications:     acetaminophen-codeine (TYLENOL with CODEINE #3) 300-30 MG per tablet, Take 1 tablet by mouth Every 4 (Four) Hours As Needed for Moderate Pain., Disp: 15 tablet, Rfl: 1    albuterol sulfate  (90 Base) MCG/ACT inhaler, Inhale 2 puffs by mouth every 4 to 6 hours as needed for cough, wheeze, shortness of breath. Use with vortex spacer, Disp: 8.5 g, Rfl: 3    allopurinol (ZYLOPRIM) 100 MG tablet, Take 1 tablet by mouth Daily., Disp: 90 tablet, Rfl: 3    betamethasone valerate (VALISONE) 0.1 % cream, Apply topically to the appropriate area as directed Daily. (Patient taking differently: Apply  topically to the appropriate area as directed Daily As Needed.), Disp: 45 g, Rfl: 11    Budeson-Glycopyrrol-Formoterol (Breztri Aerosphere) 160-9-4.8 MCG/ACT aerosol inhaler, Inhale 2 puffs by mouth twice daily. Rinse mouth after use, Disp: 10.7 g, Rfl: 11    buPROPion SR (WELLBUTRIN SR) 150 MG 12 hr tablet, Take 1 tablet by mouth 2 (Two) Times a Day., Disp: 180 tablet, Rfl: 3    calcium carbonate (Antacid Maximum) 1000 MG chewable tablet, Chew 500 mg 3 (Three) Times a Day., Disp: , Rfl:     cholecalciferol (VITAMIN D3) 25 MCG (1000 UT) tablet, Take 1 tablet by mouth Daily., Disp: , Rfl:     cloNIDine (Catapres) 0.1 MG tablet, Take 1 tablet by mouth 2 (Two) Times a Day., Disp: 180 tablet, Rfl: 1    diphenhydrAMINE (Benadryl Allergy) 25 MG tablet, Take 1-2 tablets by mouth Every 4-6 Hours As Needed., Disp: 90 tablet, Rfl: 11    DULoxetine (Cymbalta) 60 MG capsule, Take 1 capsule by mouth Daily., Disp: 90 capsule, Rfl: 3    estradiol (ESTRACE) 0.1 MG/GM  vaginal cream, Insert 0.5 grams vaginally twice weekly, Disp: 42.5 g, Rfl: 5    ferrous sulfate 325 (65 FE) MG EC tablet, Take 2 tablets by mouth Daily With Breakfast., Disp: , Rfl:     fexofenadine (ALLEGRA) 180 MG tablet, Take 1 tablet by mouth Daily., Disp: , Rfl:     Fluticasone Propionate (Xhance) 93 MCG/ACT Exhaler Suspension, Instill 1 spray in both nostrils twice a day, Disp: 16 mL, Rfl: 11    furosemide (LASIX) 40 MG tablet, Take 1 tablet by mouth Daily, twice a month (Patient taking differently: Take 1 tablet by mouth Daily. With infusions), Disp: 30 tablet, Rfl: 1    glucosamine-chondroitin 500-400 MG capsule capsule, Take 1 capsule by mouth 2 (Two) Times a Day With Meals., Disp: , Rfl:     guaifenesin-dextromethorphan 600-30 mg (MUCINEX DM)  MG tablet sustained-release 12 hour, Take 2 tablets by mouth 2 (Two) Times a Day., Disp: 60 tablet, Rfl: 1    Hydrocortisone, Perianal, (Anusol-HC) 2.5 % rectal cream, Apply rectally 2 times daily, Disp: 30 g, Rfl: 2    Hypertonic Nasal Wash (Sinus Rinse Kit) pack, use once or twice daily as needed, Disp: 1 each, Rfl: 3    immune globulin, human, (Gammagard) 20 GM/200ML solution infusion, Infuse 40 g into a venous catheter Every 28 (Twenty-Eight) Days., Disp: 400 mL, Rfl: 0    immune globulin, human, (Gammagard) 30 GM/300ML solution infusion, 40 grams IV every 4 weeks, Disp: 40 mL, Rfl: 11    ipratropium (ATROVENT) 0.06 % nasal spray, Instill 1-2 sprays in each nostril 2-3 times daily as needed, Disp: 15 mL, Rfl: 3    ipratropium-albuterol (DUO-NEB) 0.5-2.5 mg/3 ml nebulizer, Inhale the contents of 1 vial through a nebulizer every 4-6 hours as needed for cough,wheezing and shortness of breath., Disp: 90 mL, Rfl: 3    isosorbide mononitrate (IMDUR) 30 MG 24 hr tablet, Take 1 tablet by mouth Every Morning., Disp: 90 tablet, Rfl: 0    levothyroxine (SYNTHROID, LEVOTHROID) 50 MCG tablet, Take 1 tablet by mouth Daily., Disp: 90 tablet, Rfl: 3    lidocaine  (XYLOCAINE) 5 % ointment, Apply 1 Application topically to the appropriate area as directed Every 2 (Two) Hours As Needed for Mild Pain., Disp: 50 g, Rfl: 0    linaclotide (Linzess) 145 MCG capsule capsule, Take 1 capsule by mouth Every Morning Before Breakfast., Disp: 30 capsule, Rfl: 11    Magnesium 250 MG tablet, Take 2 tablets by mouth 2 (Two) Times a Day., Disp: , Rfl:     Mepolizumab (Nucala) 100 MG/ML solution auto-injector, Inject 1 mL under the skin into the appropriate area as directed Every 28 (Twenty-Eight) Days. Obtaining medication from [facility] to Nucala PAP, Disp: , Rfl:     Misc. Devices (Sitz Bath) misc, Use 1 each As Needed (for hemorrhoids)., Disp: 1 each, Rfl: 0    montelukast (SINGULAIR) 10 MG tablet, Take 1 tablet by mouth every night at bedtime., Disp: 90 tablet, Rfl: 3    multivitamin with minerals tablet tablet, Take 1 tablet by mouth Daily., Disp: , Rfl:     omeprazole (priLOSEC) 40 MG capsule, Take 1 capsule by mouth 2 (Two) Times a Day., Disp: 180 capsule, Rfl: 3    ondansetron (ZOFRAN) 4 MG tablet, Take 1 tablet by mouth Every 8 (Eight) Hours As Needed for Nausea or Vomiting., Disp: 30 tablet, Rfl: 11    potassium citrate (UROCIT-K) 10 MEQ (1080 MG) CR tablet, Take 1 tablet by mouth Daily., Disp: 90 tablet, Rfl: 3    tiZANidine (ZANAFLEX) 4 MG tablet, Take 1 tablet by mouth 2 (Two) Times a Day As Needed for Muscle Spasms., Disp: 100 tablet, Rfl: 3    ubrogepant (Ubrelvy) 100 MG tablet, Take 1 tablet by mouth 1 (One) Time As Needed (migraine)., Disp: 3 tablet, Rfl: 0    valsartan (DIOVAN) 160 MG tablet, Take 1 tablet by mouth Daily., Disp: 90 tablet, Rfl: 3    vitamin B-12 (CYANOCOBALAMIN) 1000 MCG tablet, Take 1 tablet by mouth Daily., Disp: , Rfl:     POTASSIUM CHLORIDE PO, Take 20 mEq by mouth Take As Directed. Takes two times a month with Lasix, Disp: , Rfl:      Allergies:   Allergies   Allergen Reactions    Cefaclor Hives and Swelling     Other reaction(s): SWELLING  Shed 4 layers  "of skin and extremely SOB  Cesario Bishop's syndrome        Cephalexin Other (See Comments)     Cesario-Bishop's syndrome      Cephalosporins Hives, Swelling and Angioedema     Rechallenge with cefipime caused rash on 1/14/08.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    Verapamil Nausea And Vomiting     Dizzy    Zolpidem Other (See Comments) and Unknown (See Comments)     Automatic behaviour in sleep.  confusion    Amoxicillin Rash    Edecrin [Ethacrynic Acid] Rash and Other (See Comments)     Weight gain    Hydrochlorothiazide Hives    Sulfa Antibiotics Hives       Objective     Physical Exam:  Vital Signs:   Vitals:    01/17/25 0948   BP: 138/82   Pulse: 91   SpO2: 97%   Weight: 97.5 kg (215 lb)   Height: 165.1 cm (65\")     Body mass index is 35.78 kg/m².    Physical Exam  Vitals reviewed.   Constitutional:       General: She is not in acute distress.     Appearance: She is not toxic-appearing.   HENT:      Head: Normocephalic and atraumatic.      Mouth/Throat:      Mouth: Mucous membranes are moist.   Eyes:      Extraocular Movements: Extraocular movements intact.      Conjunctiva/sclera: Conjunctivae normal.   Cardiovascular:      Rate and Rhythm: Normal rate.      Heart sounds: Normal heart sounds.   Pulmonary:      Effort: Pulmonary effort is normal.      Breath sounds: Normal breath sounds.   Abdominal:      General: There is no distension.      Palpations: Abdomen is soft.   Musculoskeletal:         " General: No swelling.      Cervical back: Neck supple.   Skin:     General: Skin is warm and dry.      Findings: No rash.   Neurological:      General: No focal deficit present.      Mental Status: She is alert and oriented to person, place, and time.   Psychiatric:         Mood and Affect: Mood normal.         Behavior: Behavior normal.       Assessment / Plan      Assessment/Plan:   Diagnoses and all orders for this visit:    1. Severe persistent asthma without complication (Primary)  -     predniSONE (DELTASONE) 20 MG tablet; Take 2 tablets by mouth Daily.  Dispense: 10 tablet; Refill: 0  Symptoms currently appear to be at her baseline.  Will continue on monthly Nucala injections as this has proven beneficial to her.  Continue current inhaled regimen. We discussed the risk and benefits of inhaled corticosteroids. Patient instructed to take them on a regular basis as prescribed. Patient instructed to rinse their mouth out after each use.  I discussed her case with the [facility] pharmacy staff and they will arrange for her new Nucala and Breztri prescriptions to be filled locally.       Follow Up:   Return in about 6 months (around 7/17/2025) for Recheck.  The patient was counseled on diagnostic results, risks and benefits of treatment options, risk factor modifications and the importance of treatment compliance. The patient was advised to contact the clinic with concerns or worsening symptoms.     LILIBETH Hooker   Pulmonary Medicine [place]     This document has been electronically signed by LILIBETH Hooker  January 17, 2025

## 2025-01-22 RX ORDER — VALSARTAN 160 MG/1
160 TABLET ORAL DAILY
Qty: 90 TABLET | Refills: 3 | Status: SHIPPED | OUTPATIENT
Start: 2025-01-22

## 2025-01-24 ENCOUNTER — HOSPITAL ENCOUNTER (OUTPATIENT)
Facility: HOSPITAL | Age: 70
Discharge: HOME | End: 2025-01-24
Payer: MEDICARE

## 2025-01-24 VITALS
WEIGHT: 216 LBS | TEMPERATURE: 97.9 F | HEART RATE: 79 BPM | DIASTOLIC BLOOD PRESSURE: 83 MMHG | RESPIRATION RATE: 16 BRPM | SYSTOLIC BLOOD PRESSURE: 143 MMHG | OXYGEN SATURATION: 97 % | BODY MASS INDEX: 35.94 KG/M2

## 2025-01-24 DIAGNOSIS — Z95.828 PORT-A-CATH IN PLACE: Primary | ICD-10-CM

## 2025-01-24 DIAGNOSIS — M19.032 PRIMARY OSTEOARTHRITIS OF LEFT WRIST: ICD-10-CM

## 2025-01-24 DIAGNOSIS — M81.0 AGE-RELATED OSTEOPOROSIS WITHOUT CURRENT PATHOLOGICAL FRACTURE: ICD-10-CM

## 2025-01-24 DIAGNOSIS — D80.1 COMMON VARIABLE AGAMMAGLOBULINEMIA: ICD-10-CM

## 2025-01-24 LAB
25(OH)D3 SERPL-MCNC: 53.3 NG/ML (ref 30–100)
ALBUMIN SERPL-MCNC: 4.2 G/DL (ref 3.5–5.2)
ALBUMIN/GLOB SERPL: 1.7 G/DL
ALP SERPL-CCNC: 55 U/L (ref 39–117)
ALT SERPL W P-5'-P-CCNC: 16 U/L (ref 1–33)
ANION GAP SERPL CALCULATED.3IONS-SCNC: 10.3 MMOL/L (ref 5–15)
AST SERPL-CCNC: 23 U/L (ref 1–32)
BILIRUB SERPL-MCNC: 0.4 MG/DL (ref 0–1.2)
BUN SERPL-MCNC: 14 MG/DL (ref 8–23)
BUN/CREAT SERPL: 12.6 (ref 7–25)
CALCIUM SPEC-SCNC: 8.6 MG/DL (ref 8.6–10.5)
CHLORIDE SERPL-SCNC: 99 MMOL/L (ref 98–107)
CO2 SERPL-SCNC: 23.7 MMOL/L (ref 22–29)
CREAT SERPL-MCNC: 1.11 MG/DL (ref 0.57–1)
EGFRCR SERPLBLD CKD-EPI 2021: 53.9 ML/MIN/1.73
GLOBULIN UR ELPH-MCNC: 2.5 GM/DL
GLUCOSE SERPL-MCNC: 141 MG/DL (ref 65–99)
POTASSIUM SERPL-SCNC: 4 MMOL/L (ref 3.5–5.2)
PROT SERPL-MCNC: 6.7 G/DL (ref 6–8.5)
SODIUM SERPL-SCNC: 133 MMOL/L (ref 136–145)

## 2025-01-24 PROCEDURE — 82306 VITAMIN D 25 HYDROXY: CPT | Performed by: INTERNAL MEDICINE

## 2025-01-24 PROCEDURE — 25010000002 HEPARIN LOCK FLUSH PER 10 UNITS: Performed by: ALLERGY & IMMUNOLOGY

## 2025-01-24 PROCEDURE — 80053 COMPREHEN METABOLIC PANEL: CPT | Performed by: INTERNAL MEDICINE

## 2025-01-24 PROCEDURE — 25810000003 SODIUM CHLORIDE 0.9 % SOLUTION: Performed by: ALLERGY & IMMUNOLOGY

## 2025-01-24 PROCEDURE — 96361 HYDRATE IV INFUSION ADD-ON: CPT

## 2025-01-24 PROCEDURE — 96365 THER/PROPH/DIAG IV INF INIT: CPT

## 2025-01-24 PROCEDURE — 96366 THER/PROPH/DIAG IV INF ADDON: CPT

## 2025-01-24 PROCEDURE — 25010000002 IMMUNE GLOBULIN (HUMAN) 20 GM/200ML SOLUTION: Performed by: ALLERGY & IMMUNOLOGY

## 2025-01-24 RX ORDER — HEPARIN SODIUM (PORCINE) LOCK FLUSH IV SOLN 100 UNIT/ML 100 UNIT/ML
500 SOLUTION INTRAVENOUS AS NEEDED
Status: DISCONTINUED | OUTPATIENT
Start: 2025-01-24 | End: 2025-01-25 | Stop reason: HOSPADM

## 2025-01-24 RX ORDER — METHYLPREDNISOLONE SODIUM SUCCINATE 125 MG/2ML
50 INJECTION, POWDER, LYOPHILIZED, FOR SOLUTION INTRAMUSCULAR; INTRAVENOUS ONCE AS NEEDED
Status: DISCONTINUED | OUTPATIENT
Start: 2025-01-24 | End: 2025-01-25 | Stop reason: HOSPADM

## 2025-01-24 RX ORDER — DIPHENHYDRAMINE HCL 25 MG
50 CAPSULE ORAL ONCE AS NEEDED
Status: DISCONTINUED | OUTPATIENT
Start: 2025-01-24 | End: 2025-01-25 | Stop reason: HOSPADM

## 2025-01-24 RX ORDER — PREDNISONE 20 MG/1
40 TABLET ORAL ONCE AS NEEDED
Start: 2025-02-21

## 2025-01-24 RX ORDER — PREDNISONE 20 MG/1
40 TABLET ORAL ONCE
Start: 2025-02-21 | End: 2025-02-21

## 2025-01-24 RX ORDER — EPINEPHRINE 1 MG/ML
0.3 INJECTION, SOLUTION INTRAMUSCULAR; SUBCUTANEOUS ONCE AS NEEDED
Start: 2025-02-21

## 2025-01-24 RX ORDER — DIPHENHYDRAMINE HCL 25 MG
50 CAPSULE ORAL ONCE
Status: DISCONTINUED | OUTPATIENT
Start: 2025-01-24 | End: 2025-01-25 | Stop reason: HOSPADM

## 2025-01-24 RX ORDER — DIPHENHYDRAMINE HCL 25 MG
50 CAPSULE ORAL ONCE AS NEEDED
Start: 2025-02-21

## 2025-01-24 RX ORDER — ACETAMINOPHEN 325 MG/1
325 TABLET ORAL ONCE
OUTPATIENT
Start: 2025-02-21

## 2025-01-24 RX ORDER — DIPHENHYDRAMINE HCL 25 MG
50 CAPSULE ORAL ONCE
Start: 2025-02-21 | End: 2025-02-21

## 2025-01-24 RX ORDER — HEPARIN SODIUM (PORCINE) LOCK FLUSH IV SOLN 100 UNIT/ML 100 UNIT/ML
500 SOLUTION INTRAVENOUS AS NEEDED
OUTPATIENT
Start: 2025-01-24

## 2025-01-24 RX ORDER — PREDNISONE 20 MG/1
40 TABLET ORAL ONCE AS NEEDED
Status: DISCONTINUED | OUTPATIENT
Start: 2025-01-24 | End: 2025-01-25 | Stop reason: HOSPADM

## 2025-01-24 RX ORDER — METHYLPREDNISOLONE SODIUM SUCCINATE 125 MG/2ML
50 INJECTION, POWDER, LYOPHILIZED, FOR SOLUTION INTRAMUSCULAR; INTRAVENOUS ONCE AS NEEDED
Start: 2025-02-21

## 2025-01-24 RX ORDER — SODIUM CHLORIDE 0.9 % (FLUSH) 0.9 %
10 SYRINGE (ML) INJECTION AS NEEDED
Status: DISCONTINUED | OUTPATIENT
Start: 2025-01-24 | End: 2025-01-25 | Stop reason: HOSPADM

## 2025-01-24 RX ORDER — ACETAMINOPHEN 325 MG/1
325 TABLET ORAL ONCE
Status: DISCONTINUED | OUTPATIENT
Start: 2025-01-24 | End: 2025-01-25 | Stop reason: HOSPADM

## 2025-01-24 RX ORDER — EPINEPHRINE 1 MG/ML
0.3 INJECTION, SOLUTION INTRAMUSCULAR; SUBCUTANEOUS ONCE AS NEEDED
Status: DISCONTINUED | OUTPATIENT
Start: 2025-01-24 | End: 2025-01-25 | Stop reason: HOSPADM

## 2025-01-24 RX ORDER — SODIUM CHLORIDE 0.9 % (FLUSH) 0.9 %
10 SYRINGE (ML) INJECTION AS NEEDED
OUTPATIENT
Start: 2025-01-24

## 2025-01-24 RX ORDER — PREDNISONE 20 MG/1
40 TABLET ORAL ONCE
Status: DISCONTINUED | OUTPATIENT
Start: 2025-01-24 | End: 2025-01-25 | Stop reason: HOSPADM

## 2025-01-24 RX ADMIN — IMMUNE GLOBULIN INFUSION (HUMAN) 20 G: 100 INJECTION, SOLUTION INTRAVENOUS; SUBCUTANEOUS at 11:34

## 2025-01-24 RX ADMIN — IMMUNE GLOBULIN INFUSION (HUMAN) 20 G: 100 INJECTION, SOLUTION INTRAVENOUS; SUBCUTANEOUS at 09:43

## 2025-01-24 RX ADMIN — HEPARIN 500 UNITS: 100 SYRINGE at 13:09

## 2025-01-24 RX ADMIN — Medication 10 ML: at 13:09

## 2025-01-24 RX ADMIN — SODIUM CHLORIDE 500 ML: 9 INJECTION, SOLUTION INTRAVENOUS at 09:10

## 2025-01-27 DIAGNOSIS — J45.41 MODERATE PERSISTENT ASTHMA WITH EXACERBATION: ICD-10-CM

## 2025-01-27 DIAGNOSIS — R05.2 SUBACUTE COUGH: ICD-10-CM

## 2025-01-27 RX ORDER — ACETAMINOPHEN AND CODEINE PHOSPHATE 300; 30 MG/1; MG/1
1 TABLET ORAL EVERY 4 HOURS PRN
Qty: 15 TABLET | Refills: 1 | Status: SHIPPED | OUTPATIENT
Start: 2025-01-27

## 2025-01-29 ENCOUNTER — OFFICE VISIT (OUTPATIENT)
Dept: UROLOGY | Facility: CLINIC | Age: 70
End: 2025-01-29
Payer: MEDICARE

## 2025-01-29 VITALS
WEIGHT: 216 LBS | OXYGEN SATURATION: 100 % | HEIGHT: 65 IN | DIASTOLIC BLOOD PRESSURE: 82 MMHG | BODY MASS INDEX: 35.99 KG/M2 | SYSTOLIC BLOOD PRESSURE: 128 MMHG | TEMPERATURE: 98.2 F | HEART RATE: 78 BPM

## 2025-01-29 DIAGNOSIS — N39.41 URGE INCONTINENCE OF URINE: Primary | ICD-10-CM

## 2025-01-29 NOTE — PROGRESS NOTES
InterStim follow-up     Patient Name: Raquel Mariee  : 1955   MRN: [id number]     Chief Complaint:   Chief Complaint   Patient presents with    Follow-up     Interstim day         History of Present Illness: Raquel Mariee is a 69 y.o. female with history below in assessment, who presents for follow up.     Device: Interstim X implanted 2023  Current Program: 3(-2,+0)    Current Amplitude:  2.5 mA  PVR:   67ml    Patient complaint: Worsening urge incontinence and nocturia  Pads/Pull ups per day 2  Leaks 8-10 also leaks with coughing and sneezing  Urgency mild  Frequency day time every 3-4 and every 2 hours in the night  Nocturia 3-4    InterStim turned on yes  Is stimulation uncomfortable yes when she turns it up she has pain in the rectum at her hemorrhoid   Any changes to diet No  Any constipation Yes   Any changes in medication  yes started Linzess  Any changes in other medical conditions lost vision in her right eye was tested for a stroke and now being tested for MS   Recent fall or recent procedure/surgery None    Daily water intake  1-2 gallons daily  Caffeine 1 soda in the evening   Pelvic floor PT  No    Other Bladder Medications and Treatments: none      Subjective      Review of System:   As noted in HPI     Past Medical History:   Past Medical History:   Diagnosis Date    Anxiety     Aortic valve stenosis     Cardiac murmur     Cataract, bilateral     CHF (congestive heart failure)     Chronic kidney disease     Stage III    Clostridium difficile infection     in her 30s    Common variable immunodeficiency     IVIG infusions    COPD (chronic obstructive pulmonary disease)     Depression     Diabetes mellitus     type II    Eosinophilic asthma     Fibromyalgia, primary     Full dentures     GERD (gastroesophageal reflux disease)     History of transfusion     in  at [facility].  No reaction noted, patient states she does have an antibody from transfusion.     Hypertension     Hypogammaglobulinemia     Hypothyroidism     Irritable bowel syndrome     Migraines     Morbid obesity     Optic neuritis     Optic neuropathy     Osteoarthritis     Prinzmetal angina 1990    Pseudomonas infection 1998    lungs    PTSD (post-traumatic stress disorder)     Pulmonary edema     Renal insufficiency     Sinus tachycardia 1990    Sleep apnea     no longer uses/needs cpap    Tachycardia     TIA (transient ischemic attack) 2017    Trochanteric bursitis 01/25/2023       Past Surgical History:   Past Surgical History:   Procedure Laterality Date    APPENDECTOMY      BUNIONECTOMY Bilateral     CARDIAC CATHETERIZATION  2017    no stents needed    CARPAL TUNNEL RELEASE Right     COLONOSCOPY  2021    ENDOMETRIAL ABLATION  1997    EYE SURGERY  ? About 6-8 years ago    Laser for glaucoma    FRACTURE SURGERY  1995    No hardware; Tibeal plateau    GASTRIC BYPASS      HAND SURGERY Left     No hardware    HEMORRHOIDECTOMY N/A 04/09/2024    Procedure: HEMORRHOID BANDING X2;  Surgeon: Melissa Beck MD;  Location: [facility] OR;  Service: General;  Laterality: N/A;    INTERSTIM PLACEMENT N/A 05/03/2023    Procedure: INTERSTIM STAGES 1 AND 2 LEAD AND GENERATOR PLACEMENT;  Surgeon: Abner Nation MD;  Location: [facility] OR;  Service: Urology;  Laterality: N/A;    POSTERIOR VAGINAL REPAIR N/A 08/02/2023    Procedure: POSTERIOR COLPORRHAPHY;  Surgeon: Kellen Galan MD;  Location:  [facility] OR;  Service: Gynecology;  Laterality: N/A;    RECTAL EXAMINATION UNDER ANESTHESIA N/A 04/09/2024    Procedure: RECTAL EXAM UNDER ANESTHESIA;  Surgeon: Melissa Beck MD;  Location: [facility] OR;  Service: General;  Laterality: N/A;    REPLACEMENT TOTAL KNEE BILATERAL      TEETH EXTRACTION      all of bottom teeth removed    THORACOTOMY      TONSILLECTOMY      TOTAL ABDOMINAL HYSTERECTOMY WITH SALPINGO OOPHORECTOMY      TRACHEAL SURGERY      Repaired via thoracotomy    TUBAL ABDOMINAL LIGATION  1983    VENOUS  ACCESS DEVICE (PORT) INSERTION      port a cath in the left chest wall       Family History:   Family History   Problem Relation Age of Onset    Diabetes Mother     Stroke Mother     Heart disease Mother     Hypertension Mother     Endometriosis Mother     Depression Mother     Diabetes Father     Hypertension Father     Stroke Father     Heart attack Father     Asthma Father     COPD Father     Dementia Father     Heart disease Father     COPD Sister     Asthma Sister     Cancer Sister         Nasal cell skin    Brain cancer Sister     Diabetes Sister     Stroke Sister     Heart attack Sister     Endometriosis Sister     Depression Sister     Arthritis Sister         Rheumatoid    Hypertension Sister     Kidney disease Sister     Diabetes Sister     COPD Sister     Asthma Sister     Endometriosis Sister     Depression Sister     Cancer Sister         Glioma    Heart disease Sister     Heart attack Sister     Endometriosis Sister     Asthma Sister     Hypertension Sister     Kidney disease Sister         ESRD    COPD Brother     Asthma Brother         Turned into COPD    Diabetes Brother     Asthma Brother     COPD Brother     Diabetes Brother     Hypertension Brother     Asthma Daughter     Endometriosis Daughter     Osteoarthritis Daughter     Hypertension Daughter     Kidney failure Daughter     Irritable bowel syndrome Daughter     Anxiety disorder Daughter     Bipolar disorder Daughter     Depression Daughter     Self-Injurious Behavior  Daughter     Suicide Attempts Daughter     Mental illness Daughter         Bipolar and eating fisorder    Asthma Daughter     Endometriosis Daughter     Osteoarthritis Daughter     Asthma Son     ADD / ADHD Son     Alcohol abuse Son     Drug abuse Son     Breast cancer Maternal Cousin     Heart disease Maternal Grandfather     Heart disease Maternal Uncle     OCD Neg Hx     Paranoid behavior Neg Hx     Schizophrenia Neg Hx     Seizures Neg Hx     Ovarian cancer Neg Hx         Social History:   Social History     Socioeconomic History    Marital status:    Tobacco Use    Smoking status: Never     Passive exposure: Never    Smokeless tobacco: Never   Vaping Use    Vaping status: Never Used   Substance and Sexual Activity    Alcohol use: Yes     Comment: ONCE A YEAR    Drug use: Never    Sexual activity: Defer       Medications:     Current Outpatient Medications:     acetaminophen-codeine (TYLENOL with CODEINE #3) 300-30 MG per tablet, Take 1 tablet by mouth Every 4 (Four) Hours As Needed for Moderate Pain., Disp: 15 tablet, Rfl: 1    albuterol sulfate  (90 Base) MCG/ACT inhaler, Inhale 2 puffs by mouth every 4 to 6 hours as needed for cough, wheeze, shortness of breath. Use with vortex spacer, Disp: 8.5 g, Rfl: 3    allopurinol (ZYLOPRIM) 100 MG tablet, Take 1 tablet by mouth Daily., Disp: 90 tablet, Rfl: 3    betamethasone valerate (VALISONE) 0.1 % cream, Apply topically to the appropriate area as directed Daily. (Patient taking differently: Apply  topically to the appropriate area as directed Daily As Needed.), Disp: 45 g, Rfl: 11    Budeson-Glycopyrrol-Formoterol (Breztri Aerosphere) 160-9-4.8 MCG/ACT aerosol inhaler, Inhale 2 puffs by mouth twice daily. Rinse mouth after use, Disp: 10.7 g, Rfl: 11    buPROPion SR (WELLBUTRIN SR) 150 MG 12 hr tablet, Take 1 tablet by mouth 2 (Two) Times a Day., Disp: 180 tablet, Rfl: 3    calcium carbonate (Antacid Maximum) 1000 MG chewable tablet, Chew 500 mg 3 (Three) Times a Day., Disp: , Rfl:     cholecalciferol (VITAMIN D3) 25 MCG (1000 UT) tablet, Take 1 tablet by mouth Daily., Disp: , Rfl:     cloNIDine (Catapres) 0.1 MG tablet, Take 1 tablet by mouth 2 (Two) Times a Day., Disp: 180 tablet, Rfl: 1    diphenhydrAMINE (Benadryl Allergy) 25 MG tablet, Take 1-2 tablets by mouth Every 4-6 Hours As Needed., Disp: 90 tablet, Rfl: 11    DULoxetine (Cymbalta) 60 MG capsule, Take 1 capsule by mouth Daily., Disp: 90 capsule, Rfl: 3     estradiol (ESTRACE) 0.1 MG/GM vaginal cream, Insert 0.5 grams vaginally twice weekly, Disp: 42.5 g, Rfl: 5    ferrous sulfate 325 (65 FE) MG EC tablet, Take 2 tablets by mouth Daily With Breakfast., Disp: , Rfl:     fexofenadine (ALLEGRA) 180 MG tablet, Take 1 tablet by mouth Daily., Disp: , Rfl:     Fluticasone Propionate (Xhance) 93 MCG/ACT Exhaler Suspension, Instill 1 spray in both nostrils twice a day, Disp: 16 mL, Rfl: 11    furosemide (LASIX) 40 MG tablet, Take 1 tablet by mouth Daily, twice a month (Patient taking differently: Take 1 tablet by mouth Daily. With infusions), Disp: 30 tablet, Rfl: 1    glucosamine-chondroitin 500-400 MG capsule capsule, Take 1 capsule by mouth 2 (Two) Times a Day With Meals., Disp: , Rfl:     guaifenesin-dextromethorphan 600-30 mg (MUCINEX DM)  MG tablet sustained-release 12 hour, Take 2 tablets by mouth 2 (Two) Times a Day., Disp: 60 tablet, Rfl: 1    Hydrocortisone, Perianal, (Anusol-HC) 2.5 % rectal cream, Apply rectally 2 times daily, Disp: 30 g, Rfl: 2    Hypertonic Nasal Wash (Sinus Rinse Kit) pack, use once or twice daily as needed, Disp: 1 each, Rfl: 3    immune globulin, human, (Gammagard) 20 GM/200ML solution infusion, Infuse 40 g into a venous catheter Every 28 (Twenty-Eight) Days., Disp: 400 mL, Rfl: 0    immune globulin, human, (Gammagard) 30 GM/300ML solution infusion, 40 grams IV every 4 weeks, Disp: 40 mL, Rfl: 11    ipratropium (ATROVENT) 0.06 % nasal spray, Instill 1-2 sprays in each nostril 2-3 times daily as needed, Disp: 15 mL, Rfl: 3    ipratropium-albuterol (DUO-NEB) 0.5-2.5 mg/3 ml nebulizer, Inhale the contents of 1 vial through a nebulizer every 4-6 hours as needed for cough,wheezing and shortness of breath., Disp: 90 mL, Rfl: 3    isosorbide mononitrate (IMDUR) 30 MG 24 hr tablet, Take 1 tablet by mouth Every Morning., Disp: 90 tablet, Rfl: 0    levothyroxine (SYNTHROID, LEVOTHROID) 50 MCG tablet, Take 1 tablet by mouth Daily., Disp: 90 tablet,  Rfl: 3    lidocaine (XYLOCAINE) 5 % ointment, Apply 1 Application topically to the appropriate area as directed Every 2 (Two) Hours As Needed for Mild Pain., Disp: 50 g, Rfl: 0    linaclotide (Linzess) 145 MCG capsule capsule, Take 1 capsule by mouth Every Morning Before Breakfast., Disp: 30 capsule, Rfl: 11    Magnesium 250 MG tablet, Take 2 tablets by mouth 2 (Two) Times a Day., Disp: , Rfl:     Mepolizumab (Nucala) 100 MG/ML solution auto-injector, Inject 1 mL under the skin into the appropriate area as directed Every 28 (Twenty-Eight) Days. Obtaining medication from [place] to [facility] PAP, Disp: , Rfl:     Misc. Devices (Sitz Bath) misc, Use 1 each As Needed (for hemorrhoids)., Disp: 1 each, Rfl: 0    montelukast (SINGULAIR) 10 MG tablet, Take 1 tablet by mouth every night at bedtime., Disp: 90 tablet, Rfl: 3    multivitamin with minerals tablet tablet, Take 1 tablet by mouth Daily., Disp: , Rfl:     omeprazole (priLOSEC) 40 MG capsule, Take 1 capsule by mouth 2 (Two) Times a Day., Disp: 180 capsule, Rfl: 3    ondansetron (ZOFRAN) 4 MG tablet, Take 1 tablet by mouth Every 8 (Eight) Hours As Needed for Nausea or Vomiting., Disp: 30 tablet, Rfl: 11    POTASSIUM CHLORIDE PO, Take 20 mEq by mouth Take As Directed. Takes two times a month with Lasix, Disp: , Rfl:     potassium citrate (UROCIT-K) 10 MEQ (1080 MG) CR tablet, Take 1 tablet by mouth Daily., Disp: 90 tablet, Rfl: 3    predniSONE (DELTASONE) 20 MG tablet, Take 2 tablets by mouth Daily., Disp: 10 tablet, Rfl: 0    tiZANidine (ZANAFLEX) 4 MG tablet, Take 1 tablet by mouth 2 (Two) Times a Day As Needed for Muscle Spasms., Disp: 100 tablet, Rfl: 3    ubrogepant (Ubrelvy) 100 MG tablet, Take 1 tablet by mouth 1 (One) Time As Needed (migraine)., Disp: 3 tablet, Rfl: 0    valsartan (DIOVAN) 160 MG tablet, Take 1 tablet by mouth Daily., Disp: 90 tablet, Rfl: 3    vitamin B-12 (CYANOCOBALAMIN) 1000 MCG tablet, Take 1 tablet by mouth Daily., Disp: , Rfl:  "    Allergies:   Allergies   Allergen Reactions    Cefaclor Hives and Swelling     Other reaction(s): SWELLING  Shed 4 layers of skin and extremely SOB  Cesario Bishop's syndrome        Cephalexin Other (See Comments)     Cesario-Bishop's syndrome      Cephalosporins Hives, Swelling and Angioedema     Rechallenge with cefipime caused rash on 1/14/08.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    Verapamil Nausea And Vomiting     Dizzy    Zolpidem Other (See Comments) and Unknown (See Comments)     Automatic behaviour in sleep.  confusion    Amoxicillin Rash    Edecrin [Ethacrynic Acid] Rash and Other (See Comments)     Weight gain    Hydrochlorothiazide Hives    Sulfa Antibiotics Hives       Objective     Visit Vitals  /82 (BP Location: Left arm, Patient Position: Sitting, Cuff Size: Adult)   Pulse 78   Temp 98.2 °F (36.8 °C) (Temporal)   Ht 165.1 cm (65\")   Wt 98 kg (216 lb)   SpO2 100%   BMI 35.94 kg/m²        Body mass index is 35.94 kg/m².     Physical Exam  Vitals and nursing note reviewed.   Constitutional:       General: She is not in acute distress.     Appearance: Normal appearance. She is obese. She is not ill-appearing.   Pulmonary:      Effort: Pulmonary effort is normal. No respiratory distress.   Skin:     General: Skin is warm and dry.   Neurological:      General: No focal deficit present.      Mental Status: She is alert and oriented to person, place, and time.   Psychiatric:         Mood and " Affect: Mood normal.         Behavior: Behavior normal.         Labs  Lab Results   Component Value Date    COLORU Yellow 09/19/2023    CLARITYU Slightly Cloudy (A) 09/19/2023    SPECGRAV 1.005 09/19/2023    PHUR 7.0 09/19/2023    LEUKOCYTESUR Moderate (2+) (A) 09/19/2023    NITRITE Negative (A) 09/19/2023    PROTEINPOCUA Negative 09/19/2023    GLUCOSEUR Negative 09/19/2023    KETONESU Negative 09/19/2023    UROBILINOGEN Normal 09/19/2023    BILIRUBINUR Negative 09/19/2023    RBCUR 2+ (A) 09/19/2023      Lab Results   Component Value Date    RBCUA 0-2 03/22/2022    RBCUA 1 to 2 06/04/2020    BACTERIA Comment 03/22/2022    BACTERIA None Seen 06/04/2020    HYALCASTU None Seen 06/04/2020    HYALCASTU NONE SEEN 05/28/2019    SQUAMEPIUA 1 to 5 06/04/2020    SQUAMEPIUA NONE SEEN 05/28/2019        Lab Results   Component Value Date    WBC 5.50 10/21/2024    HGB 11.4 10/21/2024    HCT 32.3 (L) 10/21/2024    MCV 92 10/21/2024     10/21/2024     Lab Results   Component Value Date    GLUCOSE 141 (H) 01/24/2025    CALCIUM 8.6 01/24/2025     (L) 01/24/2025    K 4.0 01/24/2025    CO2 23.7 01/24/2025    CL 99 01/24/2025    BUN 14 01/24/2025    BUN 15 10/20/2024    CREATININE 1.11 (H) 01/24/2025    CREATININE 1.06 (H) 10/20/2024    EGFR 53.9 (L) 01/24/2025    EGFR 57.3 (L) 10/20/2024    BCR 12.6 01/24/2025    ANIONGAP 10.3 01/24/2025    ALT 16 01/24/2025    AST 23 01/24/2025       Lab Results   Component Value Date    HGBA1C 6.30 (H) 08/06/2024     I have reviewed the above labs.     Assessment / Plan    Assessment:   Diagnoses and all orders for this visit:    1. Urge incontinence of urine (Primary)         Raquel Mariee is a 69 y.o. female for worsening UUI and nocturia.  She reports she has been worked up for MS we discussed that MS can affect the bladder and cause incomplete emptying, that InterStim can help with this but we will need to continue to monitor for proper emptying.      Impedance checked and no  impedence noted.  Battery checked and estimated life ok    Amplitude changed yes   Program changed yes    Program 4 @ 1.2 stimulation felt in bicycle seat   Rate and pulse with changed as well     Plan:  Stop soda by noon to help with nocturia  Timed voids every 2 hours    Follow Up:   Return in about 4 weeks (around 2/26/2025) for Follow up Conchita Maldonado day.    Joel Griffin, MSN, APRN, FNP-C  [facility]

## 2025-01-31 ENCOUNTER — TELEPHONE (OUTPATIENT)
Dept: PULMONOLOGY | Facility: CLINIC | Age: 70
End: 2025-01-31
Payer: MEDICARE

## 2025-01-31 RX ORDER — PREDNISONE 20 MG/1
40 TABLET ORAL DAILY
Qty: 10 TABLET | Refills: 0 | Status: SHIPPED | OUTPATIENT
Start: 2025-01-31

## 2025-02-04 RX ORDER — ISOSORBIDE MONONITRATE 30 MG/1
30 TABLET, EXTENDED RELEASE ORAL EVERY MORNING
Qty: 90 TABLET | Refills: 0 | Status: SHIPPED | OUTPATIENT
Start: 2025-02-04

## 2025-02-07 ENCOUNTER — OFFICE VISIT (OUTPATIENT)
Dept: INTERNAL MEDICINE | Facility: CLINIC | Age: 70
End: 2025-02-07
Payer: MEDICARE

## 2025-02-07 VITALS
HEIGHT: 65 IN | OXYGEN SATURATION: 96 % | TEMPERATURE: 96.9 F | SYSTOLIC BLOOD PRESSURE: 122 MMHG | BODY MASS INDEX: 35.32 KG/M2 | WEIGHT: 212 LBS | DIASTOLIC BLOOD PRESSURE: 80 MMHG | RESPIRATION RATE: 16 BRPM | HEART RATE: 93 BPM

## 2025-02-07 DIAGNOSIS — Z00.00 ROUTINE GENERAL MEDICAL EXAMINATION AT A HEALTH CARE FACILITY: Primary | ICD-10-CM

## 2025-02-07 DIAGNOSIS — E55.9 VITAMIN D DEFICIENCY, UNSPECIFIED: ICD-10-CM

## 2025-02-07 DIAGNOSIS — E11.9 TYPE 2 DIABETES MELLITUS WITHOUT COMPLICATION, WITH LONG-TERM CURRENT USE OF INSULIN: ICD-10-CM

## 2025-02-07 DIAGNOSIS — Z79.4 TYPE 2 DIABETES MELLITUS WITHOUT COMPLICATION, WITH LONG-TERM CURRENT USE OF INSULIN: ICD-10-CM

## 2025-02-07 DIAGNOSIS — J32.0 MAXILLARY SINUSITIS, UNSPECIFIED CHRONICITY: ICD-10-CM

## 2025-02-07 RX ORDER — ONDANSETRON 4 MG/1
4 TABLET, FILM COATED ORAL EVERY 8 HOURS PRN
Qty: 30 TABLET | Refills: 11 | Status: SHIPPED | OUTPATIENT
Start: 2025-02-07

## 2025-02-07 NOTE — PROGRESS NOTES
Subjective   The ABCs of the Annual Wellness Visit  Medicare Wellness Visit      Raquel Mariee is a 69 y.o. patient who presents for a Medicare Wellness Visit.    The following portions of the patient's history were reviewed and   updated as appropriate: allergies, current medications, past family history, past medical history, past social history, past surgical history, and problem list.    Compared to one year ago, the patient's physical   health is the same.  Compared to one year ago, the patient's mental   health is the same.    Recent Hospitalizations:  She was not admitted to the hospital during the last year.     Current Medical Providers:  Patient Care Team:  Sanjeev Rawls MD as PCP - General (Internal Medicine)  Dandre Amador MD as Consulting Physician (Gastroenterology)  Melissa Beck MD as Surgeon (General Surgery)    Outpatient Medications Prior to Visit   Medication Sig Dispense Refill    acetaminophen-codeine (TYLENOL with CODEINE #3) 300-30 MG per tablet Take 1 tablet by mouth Every 4 (Four) Hours As Needed for Moderate Pain. 15 tablet 1    albuterol sulfate  (90 Base) MCG/ACT inhaler Inhale 2 puffs by mouth every 4 to 6 hours as needed for cough, wheeze, shortness of breath. Use with vortex spacer 8.5 g 3    allopurinol (ZYLOPRIM) 100 MG tablet Take 1 tablet by mouth Daily. 90 tablet 3    betamethasone valerate (VALISONE) 0.1 % cream Apply topically to the appropriate area as directed Daily. (Patient taking differently: Apply  topically to the appropriate area as directed Daily As Needed.) 45 g 11    Budeson-Glycopyrrol-Formoterol (BREZTRI) 160-9-4.8 MCG/ACT aerosol inhaler Inhale 2 puffs by mouth 2 (Two) Times a Day. Rinse out mouth after use 32.1 g 3    buPROPion SR (WELLBUTRIN SR) 150 MG 12 hr tablet Take 1 tablet by mouth 2 (Two) Times a Day. 180 tablet 3    calcium carbonate (Antacid Maximum) 1000 MG chewable tablet Chew 500 mg 3 (Three) Times a Day.       cholecalciferol (VITAMIN D3) 25 MCG (1000 UT) tablet Take 1 tablet by mouth Daily.      cloNIDine (Catapres) 0.1 MG tablet Take 1 tablet by mouth 2 (Two) Times a Day. 180 tablet 1    denosumab (PROLIA) 60 MG/ML solution prefilled syringe syringe Inject 1 mL under the skin into the appropriate area as directed Every 6 (Six) Months.      diphenhydrAMINE (Benadryl Allergy) 25 MG tablet Take 1-2 tablets by mouth Every 4-6 Hours As Needed. 90 tablet 11    DULoxetine (Cymbalta) 60 MG capsule Take 1 capsule by mouth Daily. 90 capsule 3    estradiol (ESTRACE) 0.1 MG/GM vaginal cream Insert 0.5 grams vaginally twice weekly 42.5 g 5    ferrous sulfate 325 (65 FE) MG EC tablet Take 2 tablets by mouth Daily With Breakfast.      fexofenadine (ALLEGRA) 180 MG tablet Take 1 tablet by mouth Daily.      Fluticasone Propionate (Xhance) 93 MCG/ACT Exhaler Suspension Instill 1 spray in both nostrils twice a day 16 mL 11    furosemide (LASIX) 40 MG tablet Take 1 tablet by mouth Daily, twice a month (Patient taking differently: Take 1 tablet by mouth Daily. With infusions) 30 tablet 1    glucosamine-chondroitin 500-400 MG capsule capsule Take 1 capsule by mouth 2 (Two) Times a Day With Meals.      guaifenesin-dextromethorphan 600-30 mg (MUCINEX DM)  MG tablet sustained-release 12 hour Take 2 tablets by mouth 2 (Two) Times a Day. 60 tablet 1    Hydrocortisone, Perianal, (Anusol-HC) 2.5 % rectal cream Apply rectally 2 times daily 30 g 2    Hypertonic Nasal Wash (Sinus Rinse Kit) pack use once or twice daily as needed 1 each 3    immune globulin, human, (Gammagard) 20 GM/200ML solution infusion Infuse 40 g into a venous catheter Every 28 (Twenty-Eight) Days. 400 mL 0    immune globulin, human, (Gammagard) 30 GM/300ML solution infusion 40 grams IV every 4 weeks 40 mL 11    ipratropium (ATROVENT) 0.06 % nasal spray Instill 1-2 sprays in each nostril 2-3 times daily as needed 15 mL 3    ipratropium-albuterol (DUO-NEB) 0.5-2.5 mg/3 ml  nebulizer Inhale the contents of 1 vial through a nebulizer every 4-6 hours as needed for cough,wheezing and shortness of breath. 90 mL 3    isosorbide mononitrate (IMDUR) 30 MG 24 hr tablet Take 1 tablet by mouth Every Morning. 90 tablet 0    levothyroxine (SYNTHROID, LEVOTHROID) 50 MCG tablet Take 1 tablet by mouth Daily. 90 tablet 3    lidocaine (XYLOCAINE) 5 % ointment Apply 1 Application topically to the appropriate area as directed Every 2 (Two) Hours As Needed for Mild Pain. 50 g 0    linaclotide (Linzess) 145 MCG capsule capsule Take 1 capsule by mouth Every Morning Before Breakfast. 30 capsule 11    Magnesium 250 MG tablet Take 2 tablets by mouth 2 (Two) Times a Day.      Mepolizumab (Nucala) 100 MG/ML solution auto-injector Inject 1 mL under the skin into the appropriate area as directed Every 28 (Twenty-Eight) Days. Obtaining medication from [place] [facility]      Misc. Devices ([place]) misc Use 1 each As Needed (for hemorrhoids). 1 each 0    montelukast (SINGULAIR) 10 MG tablet Take 1 tablet by mouth every night at bedtime. 90 tablet 3    multivitamin with minerals tablet tablet Take 1 tablet by mouth Daily.      omeprazole (priLOSEC) 40 MG capsule Take 1 capsule by mouth 2 (Two) Times a Day. 180 capsule 3    POTASSIUM CHLORIDE PO Take 20 mEq by mouth Take As Directed. Takes two times a month with Lasix      potassium citrate (UROCIT-K) 10 MEQ (1080 MG) CR tablet Take 1 tablet by mouth Daily. 90 tablet 3    predniSONE (DELTASONE) 20 MG tablet Take 2 tablets by mouth Daily. 10 tablet 0    tiZANidine (ZANAFLEX) 4 MG tablet Take 1 tablet by mouth 2 (Two) Times a Day As Needed for Muscle Spasms. 100 tablet 3    valsartan (DIOVAN) 160 MG tablet Take 1 tablet by mouth Daily. 90 tablet 3    vitamin B-12 (CYANOCOBALAMIN) 1000 MCG tablet Take 1 tablet by mouth Daily.      ondansetron (ZOFRAN) 4 MG tablet Take 1 tablet by mouth Every 8 (Eight) Hours As Needed for Nausea or Vomiting. 30 tablet 11     ubrogepant (Ubrelvy) 100 MG tablet Take 1 tablet by mouth 1 (One) Time As Needed (migraine). 3 tablet 0     No facility-administered medications prior to visit.     Opioid medication/s are on active medication list.  and I have evaluated her active treatment plan and pain score trends (see table).  Vitals:    02/07/25 0934   PainSc:   2   PainLoc: Neck     I have reviewed the chart for potential of high risk medication and harmful drug interactions in the elderly.        Aspirin is not on active medication list.  Aspirin use is not indicated based on review of current medical condition/s. Risk of harm outweighs potential benefits.  .    Patient Active Problem List   Diagnosis    Hypogammaglobulinemia    Gastric bypass status for obesity    CKD (chronic kidney disease), stage III    KALYN (obstructive sleep apnea)    Major depressive disorder in partial remission    Hypothyroidism    Fibromyalgia syndrome    Abdominal aortic aneurysm (AAA) without rupture    Nonrheumatic aortic valve stenosis    Vitamin D deficiency, unspecified     Anatomical narrow angle borderline glaucoma    Abnormal finding of blood chemistry, unspecified     Chronic diastolic heart failure    Prinzmetal angina    Common variable agammaglobulinemia    Polymyositis    Port-A-Cath in place    Abnormal CT of the chest    Acute bronchospasm    Acute kidney injury    Sinusitis, chronic    Anemia    Aortic valve stenosis and insufficiency, rheumatic    Body mass index (BMI) of 40.0 to 44.9 in adult    Calcium kidney stones    Choroidal nevus of left eye    CAD (coronary artery disease)    Claw toe, acquired    Combined forms of age-related cataract of both eyes    Depressive disorder    Essential hypertension with goal blood pressure less than 140/90    Severe persistent asthma with exacerbation    Glaucoma suspect of both eyes    Inappropriate sinus tachycardia    Migraine without aura    Mitral valve regurgitation    Osteoarthrosis involving lower leg  "   Pain in joint involving lower leg    Posterior vitreous detachment of both eyes    Primary osteoarthritis of left wrist    Anxiety disorder    Status post total knee replacement    Sleep disturbances    Senile nuclear sclerosis    Rotator cuff syndrome of left shoulder    Pure hypercholesterolemia    Thoracic aortic aneurysm without rupture    TIA (transient ischemic attack)    Tracheomalacia    Trochlear nerve palsy, left    Diabetes mellitus without complication    Urge incontinence of urine    Acute exacerbation of chronic obstructive pulmonary disease (COPD)    Internal hemorrhoid, bleeding     Advance Care Planning Advance Directive is on file.  ACP discussion was held with the patient during this visit. Patient has an advance directive in EMR which is still valid.             Objective   Vitals:    02/07/25 0934   BP: 122/80   Pulse: 93   Resp: 16   Temp: 96.9 °F (36.1 °C)   SpO2: 96%   Weight: 96.2 kg (212 lb)   Height: 165.1 cm (65\")   PainSc:   2   PainLoc: Neck       Estimated body mass index is 35.28 kg/m² as calculated from the following:    Height as of this encounter: 165.1 cm (65\").    Weight as of this encounter: 96.2 kg (212 lb).    Class 2 Severe Obesity (BMI >=35 and <=39.9). Obesity-related health conditions include the following: obstructive sleep apnea, hypertension, and diabetes mellitus. Obesity is unchanged. BMI is is above average; BMI management plan is completed. We discussed portion control and increasing exercise.           Does the patient have evidence of cognitive impairment? No                                                                                                Health  Risk Assessment    Smoking Status:  Social History     Tobacco Use   Smoking Status Never    Passive exposure: Never   Smokeless Tobacco Never     Alcohol Consumption:  Social History     Substance and Sexual Activity   Alcohol Use Yes    Comment: ONCE A YEAR       Fall Risk Screen  STEADI Fall Risk " Assessment was completed, and patient is at MODERATE risk for falls. Assessment completed on:2025    Depression Screening   Little interest or pleasure in doing things? Not at all   Feeling down, depressed, or hopeless? Several days   PHQ-2 Total Score 1      Health Habits and Functional and Cognitive Screenin/7/2025     9:40 AM   Functional & Cognitive Status   Do you have difficulty preparing food and eating? No   Do you have difficulty bathing yourself, getting dressed or grooming yourself? No   Do you have difficulty using the toilet? No   Do you have difficulty moving around from place to place? No   Do you have trouble with steps or getting out of a bed or a chair? Yes   Current Diet Well Balanced Diet   Dental Exam Up to date   Eye Exam Up to date   Exercise (times per week) 2 times per week   Current Exercises Include House Cleaning;Walking   Do you need help using the phone?  No   Are you deaf or do you have serious difficulty hearing?  No   Do you need help to go to places out of walking distance? No   Do you need help shopping? No   Do you need help preparing meals?  No   Do you need help with housework?  No   Do you need help with laundry? No   Do you need help taking your medications? No   Do you need help managing money? No   Do you ever drive or ride in a car without wearing a seat belt? No   Have you felt unusual stress, anger or loneliness in the last month? Yes   Who do you live with? Spouse   If you need help, do you have trouble finding someone available to you? No   Have you been bothered in the last four weeks by sexual problems? No   Do you have difficulty concentrating, remembering or making decisions? Yes           Age-appropriate Screening Schedule:  Refer to the list below for future screening recommendations based on patient's age, sex and/or medical conditions. Orders for these recommended tests are listed in the plan section. The patient has been provided with a written  "plan.    Health Maintenance List  Health Maintenance   Topic Date Due    DIABETIC EYE EXAM  09/19/2024    COLORECTAL CANCER SCREENING  01/20/2025    ANNUAL WELLNESS VISIT  02/06/2025    HEMOGLOBIN A1C  02/06/2025    LIPID PANEL  03/01/2025    COVID-19 Vaccine (7 - 2024-25 season) 02/09/2025 (Originally 9/1/2024)    BMI FOLLOWUP  02/07/2026    DXA SCAN  02/23/2026    MAMMOGRAM  05/16/2026    TDAP/TD VACCINES (7 - Td or Tdap) 11/30/2033    HEPATITIS C SCREENING  Completed    INFLUENZA VACCINE  Completed    Pneumococcal Vaccine 65+  Completed    ZOSTER VACCINE  Completed    URINE MICROALBUMIN  Discontinued                                                                                                                                                CMS Preventative Services Quick Reference  Risk Factors Identified During Encounter  Fall Risk-High or Moderate: Discussed Fall Prevention in the home and Information on Fall Prevention Shared in After Visit Summary    The above risks/problems have been discussed with the patient.  Pertinent information has been shared with the patient in the After Visit Summary.  An After Visit Summary and PPPS were made available to the patient.    Follow Up:   Next Medicare Wellness visit to be scheduled in 1 year.         Additional E&M Note during same encounter follows:  Patient has additional, significant, and separately identifiable condition(s)/problem(s) that require work above and beyond the Medicare Wellness Visit     Chief Complaint  Medicare Wellness-subsequent    Subjective   HPI  Raquel is also being seen today for an annual adult preventative physical exam.                 Objective   Vital Signs:  /80   Pulse 93   Temp 96.9 °F (36.1 °C)   Resp 16   Ht 165.1 cm (65\")   Wt 96.2 kg (212 lb)   SpO2 96%   BMI 35.28 kg/m²   Physical Exam    The following data was reviewed by: Sanjeev Rawls MD on 02/07/2025:  Data reviewed : cmp  CMP          8/6/2024    10:41 " 10/20/2024    09:41 1/24/2025    09:03   CMP   Glucose 96  122  141    BUN 16  15  14    Creatinine 1.15  1.06  1.11    EGFR  57.3  53.9    Sodium 136  131  133    Potassium 4.7  4.4  4.0    Chloride 98  97  99    Calcium 9.6  8.9  8.6    Total Protein 6.6      Total Protein  6.8  6.7    Albumin 4.3  4.1  4.2    Globulin 2.3      Globulin  2.7  2.5    Total Bilirubin 0.4  0.4  0.4    Alkaline Phosphatase 69  52  55    AST (SGOT) 28  25  23    ALT (SGPT) 17  17  16    Albumin/Globulin Ratio  1.5  1.7    BUN/Creatinine Ratio 13.9  14.2  12.6    Anion Gap  10.1  10.3              Assessment and Plan Additional age appropriate preventative wellness advice topics were discussed during today's preventative wellness exam(some topics already addressed during AWV portion of the note above):    Physical Activity: Advised cardiovascular activity 150 minutes per week as tolerated. (example brisk walk for 30 minutes, 5 days a week).     Nutrition: Discussed nutrition plan with patient. Information shared in after visit summary. Goal is for a well balanced diet to enhance overall health.     Healthy Weight: Discussed current and goal BMI with patient. Steps to attain this goal discussed. Information shared in after visit summary.           Maxillary sinusitis, unspecified chronicity    Orders:    ubrogepant (Ubrelvy) 100 MG tablet; Take 1 tablet by mouth 1 (One) Time As Needed (migraine).    Type 2 diabetes mellitus without complication, with long-term current use of insulin  Diabetes is stable.   Continue current treatment regimen.  Diabetes will be reassessed in 6 months    Orders:    ondansetron (ZOFRAN) 4 MG tablet; Take 1 tablet by mouth Every 8 (Eight) Hours As Needed for Nausea or Vomiting.    ubrogepant (Ubrelvy) 100 MG tablet; Take 1 tablet by mouth 1 (One) Time As Needed (migraine).    Lipid Panel    CBC & Differential    Vitamin B12    Comprehensive Metabolic Panel    TSH    T4, Free    Vitamin D,25-Hydroxy     Hemoglobin A1c    Microalbumin / Creatinine Urine Ratio - Urine, Clean Catch    Routine general medical examination at a health care facility    Orders:    ondansetron (ZOFRAN) 4 MG tablet; Take 1 tablet by mouth Every 8 (Eight) Hours As Needed for Nausea or Vomiting.    ubrogepant (Ubrelvy) 100 MG tablet; Take 1 tablet by mouth 1 (One) Time As Needed (migraine).    Lipid Panel    CBC & Differential    Vitamin B12    Comprehensive Metabolic Panel    TSH    T4, Free    Vitamin D,25-Hydroxy    Hemoglobin A1c    Microalbumin / Creatinine Urine Ratio - Urine, Clean Catch    Vitamin D deficiency, unspecified    Orders:    Vitamin D,25-Hydroxy          I spent 12 minutes caring for Raquel on this date of service. This time includes time spent by me in the following activities:preparing for the visit, reviewing tests, and obtaining and/or reviewing a separately obtained history  Follow Up   Return in about 6 months (around 8/7/2025).  Patient was given instructions and counseling regarding her condition or for health maintenance advice. Please see specific information pulled into the AVS if appropriate.

## 2025-02-07 NOTE — PROGRESS NOTES
Subjective     Patient ID: Raquel Mariee is a 69 y.o. female. Patient is here for management of multiple medical problems.     Chief Complaint   Patient presents with    Medicare Wellness-subsequent     History of Present Illness   M/c wellness    The following portions of the patient's history were reviewed and updated as appropriate: allergies, current medications, past family history, past medical history, past social history, past surgical history and problem list.    Review of Systems    Current Outpatient Medications:     acetaminophen-codeine (TYLENOL with CODEINE #3) 300-30 MG per tablet, Take 1 tablet by mouth Every 4 (Four) Hours As Needed for Moderate Pain., Disp: 15 tablet, Rfl: 1    albuterol sulfate  (90 Base) MCG/ACT inhaler, Inhale 2 puffs by mouth every 4 to 6 hours as needed for cough, wheeze, shortness of breath. Use with vortex spacer, Disp: 8.5 g, Rfl: 3    allopurinol (ZYLOPRIM) 100 MG tablet, Take 1 tablet by mouth Daily., Disp: 90 tablet, Rfl: 3    betamethasone valerate (VALISONE) 0.1 % cream, Apply topically to the appropriate area as directed Daily. (Patient taking differently: Apply  topically to the appropriate area as directed Daily As Needed.), Disp: 45 g, Rfl: 11    Budeson-Glycopyrrol-Formoterol (BREZTRI) 160-9-4.8 MCG/ACT aerosol inhaler, Inhale 2 puffs by mouth 2 (Two) Times a Day. Rinse out mouth after use, Disp: 32.1 g, Rfl: 3    buPROPion SR (WELLBUTRIN SR) 150 MG 12 hr tablet, Take 1 tablet by mouth 2 (Two) Times a Day., Disp: 180 tablet, Rfl: 3    calcium carbonate (Antacid Maximum) 1000 MG chewable tablet, Chew 500 mg 3 (Three) Times a Day., Disp: , Rfl:     cholecalciferol (VITAMIN D3) 25 MCG (1000 UT) tablet, Take 1 tablet by mouth Daily., Disp: , Rfl:     cloNIDine (Catapres) 0.1 MG tablet, Take 1 tablet by mouth 2 (Two) Times a Day., Disp: 180 tablet, Rfl: 1    denosumab (PROLIA) 60 MG/ML solution prefilled syringe syringe, Inject 1 mL under the skin into the  appropriate area as directed Every 6 (Six) Months., Disp: , Rfl:     diphenhydrAMINE (Benadryl Allergy) 25 MG tablet, Take 1-2 tablets by mouth Every 4-6 Hours As Needed., Disp: 90 tablet, Rfl: 11    DULoxetine (Cymbalta) 60 MG capsule, Take 1 capsule by mouth Daily., Disp: 90 capsule, Rfl: 3    estradiol (ESTRACE) 0.1 MG/GM vaginal cream, Insert 0.5 grams vaginally twice weekly, Disp: 42.5 g, Rfl: 5    ferrous sulfate 325 (65 FE) MG EC tablet, Take 2 tablets by mouth Daily With Breakfast., Disp: , Rfl:     fexofenadine (ALLEGRA) 180 MG tablet, Take 1 tablet by mouth Daily., Disp: , Rfl:     Fluticasone Propionate (Xhance) 93 MCG/ACT Exhaler Suspension, Instill 1 spray in both nostrils twice a day, Disp: 16 mL, Rfl: 11    furosemide (LASIX) 40 MG tablet, Take 1 tablet by mouth Daily, twice a month (Patient taking differently: Take 1 tablet by mouth Daily. With infusions), Disp: 30 tablet, Rfl: 1    glucosamine-chondroitin 500-400 MG capsule capsule, Take 1 capsule by mouth 2 (Two) Times a Day With Meals., Disp: , Rfl:     guaifenesin-dextromethorphan 600-30 mg (MUCINEX DM)  MG tablet sustained-release 12 hour, Take 2 tablets by mouth 2 (Two) Times a Day., Disp: 60 tablet, Rfl: 1    Hydrocortisone, Perianal, (Anusol-HC) 2.5 % rectal cream, Apply rectally 2 times daily, Disp: 30 g, Rfl: 2    Hypertonic Nasal Wash (Sinus Rinse Kit) pack, use once or twice daily as needed, Disp: 1 each, Rfl: 3    immune globulin, human, (Gammagard) 20 GM/200ML solution infusion, Infuse 40 g into a venous catheter Every 28 (Twenty-Eight) Days., Disp: 400 mL, Rfl: 0    immune globulin, human, (Gammagard) 30 GM/300ML solution infusion, 40 grams IV every 4 weeks, Disp: 40 mL, Rfl: 11    ipratropium (ATROVENT) 0.06 % nasal spray, Instill 1-2 sprays in each nostril 2-3 times daily as needed, Disp: 15 mL, Rfl: 3    ipratropium-albuterol (DUO-NEB) 0.5-2.5 mg/3 ml nebulizer, Inhale the contents of 1 vial through a nebulizer every 4-6 hours  as needed for cough,wheezing and shortness of breath., Disp: 90 mL, Rfl: 3    isosorbide mononitrate (IMDUR) 30 MG 24 hr tablet, Take 1 tablet by mouth Every Morning., Disp: 90 tablet, Rfl: 0    levothyroxine (SYNTHROID, LEVOTHROID) 50 MCG tablet, Take 1 tablet by mouth Daily., Disp: 90 tablet, Rfl: 3    lidocaine (XYLOCAINE) 5 % ointment, Apply 1 Application topically to the appropriate area as directed Every 2 (Two) Hours As Needed for Mild Pain., Disp: 50 g, Rfl: 0    linaclotide (Linzess) 145 MCG capsule capsule, Take 1 capsule by mouth Every Morning Before Breakfast., Disp: 30 capsule, Rfl: 11    Magnesium 250 MG tablet, Take 2 tablets by mouth 2 (Two) Times a Day., Disp: , Rfl:     Mepolizumab (Nucala) 100 MG/ML solution auto-injector, Inject 1 mL under the skin into the appropriate area as directed Every 28 (Twenty-Eight) Days. Obtaining medication from [place] [facility], Disp: , Rfl:     Misc. Devices (Sitz Bath) misc, Use 1 each As Needed (for hemorrhoids)., Disp: 1 each, Rfl: 0    montelukast (SINGULAIR) 10 MG tablet, Take 1 tablet by mouth every night at bedtime., Disp: 90 tablet, Rfl: 3    multivitamin with minerals tablet tablet, Take 1 tablet by mouth Daily., Disp: , Rfl:     omeprazole (priLOSEC) 40 MG capsule, Take 1 capsule by mouth 2 (Two) Times a Day., Disp: 180 capsule, Rfl: 3    POTASSIUM CHLORIDE PO, Take 20 mEq by mouth Take As Directed. Takes two times a month with Lasix, Disp: , Rfl:     potassium citrate (UROCIT-K) 10 MEQ (1080 MG) CR tablet, Take 1 tablet by mouth Daily., Disp: 90 tablet, Rfl: 3    predniSONE (DELTASONE) 20 MG tablet, Take 2 tablets by mouth Daily., Disp: 10 tablet, Rfl: 0    tiZANidine (ZANAFLEX) 4 MG tablet, Take 1 tablet by mouth 2 (Two) Times a Day As Needed for Muscle Spasms., Disp: 100 tablet, Rfl: 3    valsartan (DIOVAN) 160 MG tablet, Take 1 tablet by mouth Daily., Disp: 90 tablet, Rfl: 3    vitamin B-12 (CYANOCOBALAMIN) 1000 MCG tablet, Take 1 tablet by mouth  "Daily., Disp: , Rfl:     ondansetron (ZOFRAN) 4 MG tablet, Take 1 tablet by mouth Every 8 (Eight) Hours As Needed for Nausea or Vomiting., Disp: 30 tablet, Rfl: 11    ubrogepant (Ubrelvy) 100 MG tablet, Take 1 tablet by mouth 1 (One) Time As Needed (migraine)., Disp: 4 tablet, Rfl: 0    Objective      Blood pressure 122/80, pulse 93, temperature 96.9 °F (36.1 °C), resp. rate 16, height 165.1 cm (65\"), weight 96.2 kg (212 lb), SpO2 96%, not currently breastfeeding.    Class 2 Severe Obesity (BMI >=35 and <=39.9). Obesity-related health conditions include the following: diabetes mellitus. Obesity is unchanged. BMI is is above average; BMI management plan is completed. We discussed portion control and increasing exercise.       Physical Exam     General Appearance:    Alert, cooperative, no distress, appears stated age   Head:    Normocephalic, without obvious abnormality, atraumatic   Eyes:    PERRL, conjunctiva/corneas clear, EOM's intact   Ears:    Normal TM's and external ear canals, both ears   Nose:   Nares normal, septum midline, mucosa normal, no drainage   or sinus tenderness   Throat:   Lips, mucosa, and tongue normal; teeth and gums normal   Neck:   Supple, symmetrical, trachea midline, no adenopathy;        thyroid:  No enlargement/tenderness/nodules; no carotid    bruit or JVD   Back:     Symmetric, no curvature, ROM normal, no CVA tenderness   Lungs:     Clear to auscultation bilaterally, respirations unlabored   Chest wall:    No tenderness or deformity   Heart:    Regular rate and rhythm, S1 and S2 normal, no murmur,        rub or gallop   Abdomen:     Soft, non-tender, bowel sounds active all four quadrants,     no masses, no organomegaly   Extremities:   Extremities normal, atraumatic, no cyanosis or edema   Pulses:   2+ and symmetric all extremities   Skin:   Skin color, texture, turgor normal, no rashes or lesions   Lymph nodes:   Cervical, supraclavicular, and axillary nodes normal   Neurologic:   " CNII-XII intact. Normal strength, sensation and reflexes       throughout      Results for orders placed or performed during the hospital encounter of 01/24/25   Comprehensive Metabolic Panel    Collection Time: 01/24/25  9:03 AM    Specimen: Blood   Result Value Ref Range    Glucose 141 (H) 65 - 99 mg/dL    BUN 14 8 - 23 mg/dL    Creatinine 1.11 (H) 0.57 - 1.00 mg/dL    Sodium 133 (L) 136 - 145 mmol/L    Potassium 4.0 3.5 - 5.2 mmol/L    Chloride 99 98 - 107 mmol/L    CO2 23.7 22.0 - 29.0 mmol/L    Calcium 8.6 8.6 - 10.5 mg/dL    Total Protein 6.7 6.0 - 8.5 g/dL    Albumin 4.2 3.5 - 5.2 g/dL    ALT (SGPT) 16 1 - 33 U/L    AST (SGOT) 23 1 - 32 U/L    Alkaline Phosphatase 55 39 - 117 U/L    Total Bilirubin 0.4 0.0 - 1.2 mg/dL    Globulin 2.5 gm/dL    A/G Ratio 1.7 g/dL    BUN/Creatinine Ratio 12.6 7.0 - 25.0    Anion Gap 10.3 5.0 - 15.0 mmol/L    eGFR 53.9 (L) >60.0 mL/min/1.73   Vitamin D,25-Hydroxy    Collection Time: 01/24/25  9:03 AM    Specimen: Blood   Result Value Ref Range    25 Hydroxy, Vitamin D 53.3 30.0 - 100.0 ng/ml     *Note: Due to a large number of results and/or encounters for the requested time period, some results have not been displayed. A complete set of results can be found in Results Review.         Assessment & Plan           Diagnoses and all orders for this visit:    1. Routine general medical examination at a health care facility (Primary)  -     ondansetron (ZOFRAN) 4 MG tablet; Take 1 tablet by mouth Every 8 (Eight) Hours As Needed for Nausea or Vomiting.  Dispense: 30 tablet; Refill: 11  -     ubrogepant (Ubrelvy) 100 MG tablet; Take 1 tablet by mouth 1 (One) Time As Needed (migraine).  Dispense: 4 tablet; Refill: 0  -     Lipid Panel  -     CBC & Differential  -     Vitamin B12  -     Comprehensive Metabolic Panel  -     TSH  -     T4, Free  -     Vitamin D,25-Hydroxy  -     Hemoglobin A1c  -     Microalbumin / Creatinine Urine Ratio - Urine, Clean Catch    2. Maxillary sinusitis,  unspecified chronicity  -     ubrogepant (Ubrelvy) 100 MG tablet; Take 1 tablet by mouth 1 (One) Time As Needed (migraine).  Dispense: 4 tablet; Refill: 0    3. Type 2 diabetes mellitus without complication, with long-term current use of insulin  -     ondansetron (ZOFRAN) 4 MG tablet; Take 1 tablet by mouth Every 8 (Eight) Hours As Needed for Nausea or Vomiting.  Dispense: 30 tablet; Refill: 11  -     ubrogepant (Ubrelvy) 100 MG tablet; Take 1 tablet by mouth 1 (One) Time As Needed (migraine).  Dispense: 4 tablet; Refill: 0  -     Lipid Panel  -     CBC & Differential  -     Vitamin B12  -     Comprehensive Metabolic Panel  -     TSH  -     T4, Free  -     Vitamin D,25-Hydroxy  -     Hemoglobin A1c  -     Microalbumin / Creatinine Urine Ratio - Urine, Clean Catch    4. Vitamin D deficiency, unspecified  -     Vitamin D,25-Hydroxy      Return in about 6 months (around 8/7/2025).          There are no Patient Instructions on file for this visit.     Sanjeev Rawls MD    Assessment & Plan

## 2025-02-10 DIAGNOSIS — F32.0 CURRENT MILD EPISODE OF MAJOR DEPRESSIVE DISORDER, UNSPECIFIED WHETHER RECURRENT: Chronic | ICD-10-CM

## 2025-02-11 RX ORDER — BUPROPION HYDROCHLORIDE 150 MG/1
150 TABLET, EXTENDED RELEASE ORAL 2 TIMES DAILY
Qty: 180 TABLET | Refills: 3 | Status: SHIPPED | OUTPATIENT
Start: 2025-02-11

## 2025-02-12 ENCOUNTER — HOSPITAL ENCOUNTER (EMERGENCY)
Facility: HOSPITAL | Age: 70
Discharge: HOME | End: 2025-02-12
Attending: STUDENT IN AN ORGANIZED HEALTH CARE EDUCATION/TRAINING PROGRAM | Admitting: STUDENT IN AN ORGANIZED HEALTH CARE EDUCATION/TRAINING PROGRAM
Payer: MEDICARE

## 2025-02-12 ENCOUNTER — APPOINTMENT (OUTPATIENT)
Dept: GENERAL RADIOLOGY | Facility: HOSPITAL | Age: 70
End: 2025-02-12
Payer: MEDICARE

## 2025-02-12 VITALS
OXYGEN SATURATION: 94 % | SYSTOLIC BLOOD PRESSURE: 137 MMHG | DIASTOLIC BLOOD PRESSURE: 92 MMHG | BODY MASS INDEX: 35.34 KG/M2 | HEART RATE: 96 BPM | RESPIRATION RATE: 22 BRPM | WEIGHT: 212.08 LBS | HEIGHT: 65 IN | TEMPERATURE: 98.7 F

## 2025-02-12 DIAGNOSIS — J11.1 BRONCHITIS WITH INFLUENZA: ICD-10-CM

## 2025-02-12 DIAGNOSIS — J44.1 COPD WITH ACUTE EXACERBATION: ICD-10-CM

## 2025-02-12 DIAGNOSIS — J10.1 INFLUENZA A: Primary | ICD-10-CM

## 2025-02-12 LAB
ALBUMIN SERPL-MCNC: 4.3 G/DL (ref 3.5–5.2)
ALBUMIN/GLOB SERPL: 1.3 G/DL
ALP SERPL-CCNC: 65 U/L (ref 39–117)
ALT SERPL W P-5'-P-CCNC: 20 U/L (ref 1–33)
ANION GAP SERPL CALCULATED.3IONS-SCNC: 10.1 MMOL/L (ref 5–15)
AST SERPL-CCNC: 28 U/L (ref 1–32)
BASOPHILS # BLD AUTO: 0.03 10*3/MM3 (ref 0–0.2)
BASOPHILS NFR BLD AUTO: 0.5 % (ref 0–1.5)
BILIRUB SERPL-MCNC: 0.3 MG/DL (ref 0–1.2)
BUN SERPL-MCNC: 14 MG/DL (ref 8–23)
BUN/CREAT SERPL: 13.5 (ref 7–25)
CALCIUM SPEC-SCNC: 9.2 MG/DL (ref 8.6–10.5)
CHLORIDE SERPL-SCNC: 95 MMOL/L (ref 98–107)
CO2 SERPL-SCNC: 24.9 MMOL/L (ref 22–29)
CREAT SERPL-MCNC: 1.04 MG/DL (ref 0.57–1)
DEPRECATED RDW RBC AUTO: 42.5 FL (ref 37–54)
EGFRCR SERPLBLD CKD-EPI 2021: 58.3 ML/MIN/1.73
EOSINOPHIL # BLD AUTO: 0 10*3/MM3 (ref 0–0.4)
EOSINOPHIL NFR BLD AUTO: 0 % (ref 0.3–6.2)
ERYTHROCYTE [DISTWIDTH] IN BLOOD BY AUTOMATED COUNT: 12.4 % (ref 12.3–15.4)
FLUAV RNA RESP QL NAA+PROBE: DETECTED
FLUBV RNA RESP QL NAA+PROBE: NOT DETECTED
GLOBULIN UR ELPH-MCNC: 3.3 GM/DL
GLUCOSE SERPL-MCNC: 121 MG/DL (ref 65–99)
HCT VFR BLD AUTO: 37.5 % (ref 34–46.6)
HGB BLD-MCNC: 13.2 G/DL (ref 12–15.9)
IMM GRANULOCYTES # BLD AUTO: 0.04 10*3/MM3 (ref 0–0.05)
IMM GRANULOCYTES NFR BLD AUTO: 0.6 % (ref 0–0.5)
LYMPHOCYTES # BLD AUTO: 0.54 10*3/MM3 (ref 0.7–3.1)
LYMPHOCYTES NFR BLD AUTO: 8.8 % (ref 19.6–45.3)
MCH RBC QN AUTO: 32.8 PG (ref 26.6–33)
MCHC RBC AUTO-ENTMCNC: 35.2 G/DL (ref 31.5–35.7)
MCV RBC AUTO: 93.1 FL (ref 79–97)
MONOCYTES # BLD AUTO: 0.59 10*3/MM3 (ref 0.1–0.9)
MONOCYTES NFR BLD AUTO: 9.6 % (ref 5–12)
NEUTROPHILS NFR BLD AUTO: 4.96 10*3/MM3 (ref 1.7–7)
NEUTROPHILS NFR BLD AUTO: 80.5 % (ref 42.7–76)
NRBC BLD AUTO-RTO: 0 /100 WBC (ref 0–0.2)
NT-PROBNP SERPL-MCNC: 2637 PG/ML (ref 0–900)
PLATELET # BLD AUTO: 228 10*3/MM3 (ref 140–450)
PMV BLD AUTO: 9.1 FL (ref 6–12)
POTASSIUM SERPL-SCNC: 4.2 MMOL/L (ref 3.5–5.2)
PROT SERPL-MCNC: 7.6 G/DL (ref 6–8.5)
RBC # BLD AUTO: 4.03 10*6/MM3 (ref 3.77–5.28)
SARS-COV-2 RNA RESP QL NAA+PROBE: NOT DETECTED
SODIUM SERPL-SCNC: 130 MMOL/L (ref 136–145)
TROPONIN T SERPL HS-MCNC: 22 NG/L
WBC NRBC COR # BLD AUTO: 6.16 10*3/MM3 (ref 3.4–10.8)

## 2025-02-12 PROCEDURE — 94799 UNLISTED PULMONARY SVC/PX: CPT

## 2025-02-12 PROCEDURE — 25010000002 MAGNESIUM SULFATE IN D5W 1G/100ML (PREMIX) 1-5 GM/100ML-% SOLUTION: Performed by: STUDENT IN AN ORGANIZED HEALTH CARE EDUCATION/TRAINING PROGRAM

## 2025-02-12 PROCEDURE — 94640 AIRWAY INHALATION TREATMENT: CPT

## 2025-02-12 PROCEDURE — 96365 THER/PROPH/DIAG IV INF INIT: CPT

## 2025-02-12 PROCEDURE — 83880 ASSAY OF NATRIURETIC PEPTIDE: CPT | Performed by: STUDENT IN AN ORGANIZED HEALTH CARE EDUCATION/TRAINING PROGRAM

## 2025-02-12 PROCEDURE — 84484 ASSAY OF TROPONIN QUANT: CPT | Performed by: STUDENT IN AN ORGANIZED HEALTH CARE EDUCATION/TRAINING PROGRAM

## 2025-02-12 PROCEDURE — 96375 TX/PRO/DX INJ NEW DRUG ADDON: CPT

## 2025-02-12 PROCEDURE — 25010000002 DEXAMETHASONE SODIUM PHOSPHATE 10 MG/ML SOLUTION: Performed by: STUDENT IN AN ORGANIZED HEALTH CARE EDUCATION/TRAINING PROGRAM

## 2025-02-12 PROCEDURE — 85025 COMPLETE CBC W/AUTO DIFF WBC: CPT | Performed by: STUDENT IN AN ORGANIZED HEALTH CARE EDUCATION/TRAINING PROGRAM

## 2025-02-12 PROCEDURE — 71045 X-RAY EXAM CHEST 1 VIEW: CPT

## 2025-02-12 PROCEDURE — 87636 SARSCOV2 & INF A&B AMP PRB: CPT | Performed by: STUDENT IN AN ORGANIZED HEALTH CARE EDUCATION/TRAINING PROGRAM

## 2025-02-12 PROCEDURE — 80053 COMPREHEN METABOLIC PANEL: CPT | Performed by: STUDENT IN AN ORGANIZED HEALTH CARE EDUCATION/TRAINING PROGRAM

## 2025-02-12 PROCEDURE — 93005 ELECTROCARDIOGRAM TRACING: CPT | Performed by: STUDENT IN AN ORGANIZED HEALTH CARE EDUCATION/TRAINING PROGRAM

## 2025-02-12 PROCEDURE — 99284 EMERGENCY DEPT VISIT MOD MDM: CPT | Performed by: STUDENT IN AN ORGANIZED HEALTH CARE EDUCATION/TRAINING PROGRAM

## 2025-02-12 RX ORDER — MAGNESIUM SULFATE 1 G/100ML
1 INJECTION INTRAVENOUS ONCE
Status: COMPLETED | OUTPATIENT
Start: 2025-02-12 | End: 2025-02-12

## 2025-02-12 RX ORDER — SODIUM CHLORIDE 0.9 % (FLUSH) 0.9 %
10 SYRINGE (ML) INJECTION AS NEEDED
Status: DISCONTINUED | OUTPATIENT
Start: 2025-02-12 | End: 2025-02-12 | Stop reason: HOSPADM

## 2025-02-12 RX ORDER — ALBUTEROL SULFATE 0.83 MG/ML
10 SOLUTION RESPIRATORY (INHALATION)
Status: COMPLETED | OUTPATIENT
Start: 2025-02-12 | End: 2025-02-12

## 2025-02-12 RX ORDER — OSELTAMIVIR PHOSPHATE 30 MG/1
30 CAPSULE ORAL 2 TIMES DAILY
Qty: 10 CAPSULE | Refills: 0 | Status: SHIPPED | OUTPATIENT
Start: 2025-02-12 | End: 2025-02-17

## 2025-02-12 RX ORDER — OSELTAMIVIR PHOSPHATE 75 MG/1
75 CAPSULE ORAL ONCE
Status: COMPLETED | OUTPATIENT
Start: 2025-02-12 | End: 2025-02-12

## 2025-02-12 RX ORDER — DEXAMETHASONE SODIUM PHOSPHATE 10 MG/ML
10 INJECTION, SOLUTION INTRAMUSCULAR; INTRAVENOUS ONCE
Status: COMPLETED | OUTPATIENT
Start: 2025-02-12 | End: 2025-02-12

## 2025-02-12 RX ORDER — IPRATROPIUM BROMIDE AND ALBUTEROL SULFATE 2.5; .5 MG/3ML; MG/3ML
3 SOLUTION RESPIRATORY (INHALATION) ONCE
Status: COMPLETED | OUTPATIENT
Start: 2025-02-12 | End: 2025-02-12

## 2025-02-12 RX ORDER — CODEINE PHOSPHATE AND GUAIFENESIN 10; 100 MG/5ML; MG/5ML
5 SOLUTION ORAL 3 TIMES DAILY PRN
Qty: 90 ML | Refills: 0 | Status: SHIPPED | OUTPATIENT
Start: 2025-02-12

## 2025-02-12 RX ORDER — OSELTAMIVIR PHOSPHATE 30 MG/1
30 CAPSULE ORAL 2 TIMES DAILY
Status: DISCONTINUED | OUTPATIENT
Start: 2025-02-12 | End: 2025-02-12 | Stop reason: HOSPADM

## 2025-02-12 RX ORDER — HYDROCODONE POLISTIREX AND CHLORPHENIRAMINE POLISTIREX 10; 8 MG/5ML; MG/5ML
5 SUSPENSION, EXTENDED RELEASE ORAL ONCE
Status: COMPLETED | OUTPATIENT
Start: 2025-02-12 | End: 2025-02-12

## 2025-02-12 RX ADMIN — DEXAMETHASONE SODIUM PHOSPHATE 10 MG: 10 INJECTION INTRAMUSCULAR; INTRAVENOUS at 05:57

## 2025-02-12 RX ADMIN — OSELTAMIVIR 75 MG: 75 CAPSULE ORAL at 06:49

## 2025-02-12 RX ADMIN — ALBUTEROL SULFATE 10 MG: 2.5 SOLUTION RESPIRATORY (INHALATION) at 05:48

## 2025-02-12 RX ADMIN — HYDROCODONE POLISTIREX AND CHLORPHENIRAMINE POLISTIREX 5 ML: 10; 8 SUSPENSION, EXTENDED RELEASE ORAL at 06:50

## 2025-02-12 RX ADMIN — MAGNESIUM SULFATE HEPTAHYDRATE 1 G: 1 INJECTION, SOLUTION INTRAVENOUS at 06:00

## 2025-02-12 RX ADMIN — IPRATROPIUM BROMIDE AND ALBUTEROL SULFATE 3 ML: 2.5; .5 SOLUTION RESPIRATORY (INHALATION) at 05:48

## 2025-02-12 NOTE — PROGRESS NOTES
Pharmacokinetic Consult - Tamiflu Dosing  Raquel Mariee is a 69 y.o. female who has been consulted to dose Tamiflu for  FluA+ .    Current Antimicrobial Therapy    Anti-Infectives (From admission, onward)      None            Microbiology Results (last 10 days)       Procedure Component Value - Date/Time    COVID-19 and FLU A/B PCR, 1 HR TAT - Swab, Nasopharynx [[id number]]  (Abnormal) Collected: 02/12/25 0543    Lab Status: Final result Specimen: Swab from Nasopharynx Updated: 02/12/25 0619     COVID19 Not Detected     Influenza A PCR Detected     Influenza B PCR Not Detected    Narrative:      Fact sheet for providers: https://www.fda.gov/media/684315/download    Fact sheet for patients: https://www.fda.gov/media/809553/download    Test performed by PCR.             Allergies  Cefaclor, Cephalexin, Cephalosporins, Escitalopram oxalate, Ambien [zolpidem tartrate], Cardizem [diltiazem hcl], Metoclopramide, Mobic [meloxicam], Penicillins, Sumatriptan, Topamax [topiramate], Verapamil, Zolpidem, Amoxicillin, Edecrin [ethacrynic acid], Hydrochlorothiazide, and Sulfa antibiotics    Relevant clinical data and objective history reviewed:  Creatinine   Date Value Ref Range Status   02/12/2025 1.04 (H) 0.57 - 1.00 mg/dL Final   06/06/2020 1.72 (H) 0.51 - 0.95 mg/dL Final     Estimated Creatinine Clearance: 58.6 mL/min (A) (by C-G formula based on SCr of 1.04 mg/dL (H)).  No intake/output data recorded.  Patient weight: 96.2 kg (212 lb 1.3 oz)    Asessment/Plan  Initiate Tamiflu 75mg PO X 1 dose then Tamiflu 30mg PO twice daily.  Pharmacy will monitor Ms. Mariee's renal function and clinical status and adjust the Tamiflu dose and/or frequency as needed.    Thanks,   Danay Delarosa, PharmD  2/12/2025  06:35 EST

## 2025-02-12 NOTE — ED PROVIDER NOTES
[facility] EMERGENCY DEPARTMENT  Emergency Department Encounter  Emergency Medicine Physician Note       Pt Name: Raquel Mariee  MRN: [id number]  Pt :   1955  Room Number:    Date of encounter:  2025  PCP: Sanjeev Rawls MD  ED Provider: Darryn Schaffer MD    Historian: Patient      HPI:  Chief Complaint: Shortness of breath        Context: Raquel Mariee is a 69 y.o. female who presents to the ED c/o shortness of breath.  Patient states she has had difficulty breathing for the past 2 to 3 days.  Also has had congestion.  Started prednisone yesterday morning, 40 mg without much improvement.  Is also been using home nebulizer treatment and inhaler.  Has not seen primary care doctor or her pulmonologist for the symptoms.  Reports history of severe asthma with COPD, but never smoked cigarettes.      PAST MEDICAL HISTORY  Past Medical History:   Diagnosis Date    Anxiety     Aortic valve stenosis     Cardiac murmur     Cataract, bilateral     CHF (congestive heart failure)     Chronic kidney disease     Stage III    Clostridium difficile infection     in her 30s    Common variable immunodeficiency     IVIG infusions    COPD (chronic obstructive pulmonary disease)     Depression     Diabetes mellitus     type II    Eosinophilic asthma     Fibromyalgia, primary     Full dentures     GERD (gastroesophageal reflux disease)     History of transfusion     in  at [facility].  No reaction noted, patient states she does have an antibody from transfusion.    Hypertension     Hypogammaglobulinemia     Hypothyroidism     Irritable bowel syndrome     Migraines     Morbid obesity     Optic neuritis     Optic neuropathy     Osteoarthritis     Prinzmetal angina     Pseudomonas infection     lungs    PTSD (post-traumatic stress disorder)     Pulmonary edema     Renal insufficiency     Sinus tachycardia     Sleep apnea     no longer uses/needs cpap     Tachycardia     TIA (transient ischemic attack) 2017    Trochanteric bursitis 01/25/2023         PAST SURGICAL HISTORY  Past Surgical History:   Procedure Laterality Date    APPENDECTOMY      BUNIONECTOMY Bilateral     CARDIAC CATHETERIZATION  2017    no stents needed    CARPAL TUNNEL RELEASE Right     COLONOSCOPY  2021    ENDOMETRIAL ABLATION  1997    EYE SURGERY  ? About 6-8 years ago    Laser for glaucoma    FRACTURE SURGERY  1995    No hardware; Tibeal plateau    GASTRIC BYPASS      HAND SURGERY Left     No hardware    HEMORRHOIDECTOMY N/A 04/09/2024    Procedure: HEMORRHOID BANDING X2;  Surgeon: Melissa Beck MD;  Location:  [facility];  Service: General;  Laterality: N/A;    INTERSTIM PLACEMENT N/A 05/03/2023    Procedure: INTERSTIM STAGES 1 AND 2 LEAD AND GENERATOR PLACEMENT;  Surgeon: Abner Nation MD;  Location: [facility] OR;  Service: Urology;  Laterality: N/A;    POSTERIOR VAGINAL REPAIR N/A 08/02/2023    Procedure: POSTERIOR COLPORRHAPHY;  Surgeon: Kellen Galan MD;  Location:  [facility];  Service: Gynecology;  Laterality: N/A;    RECTAL EXAMINATION UNDER ANESTHESIA N/A 04/09/2024    Procedure: RECTAL EXAM UNDER ANESTHESIA;  Surgeon: Melissa Beck MD;  Location: [facility] OR;  Service: General;  Laterality: N/A;    REPLACEMENT TOTAL KNEE BILATERAL      TEETH EXTRACTION      all of bottom teeth removed    THORACOTOMY      TONSILLECTOMY      TOTAL ABDOMINAL HYSTERECTOMY WITH SALPINGO OOPHORECTOMY      TRACHEAL SURGERY      Repaired via thoracotomy    TUBAL ABDOMINAL LIGATION  1983    VENOUS ACCESS DEVICE (PORT) INSERTION      port a cath in the left chest wall         FAMILY HISTORY  Family History   Problem Relation Age of Onset    Diabetes Mother     Stroke Mother     Heart disease Mother     Hypertension Mother     Endometriosis Mother     Depression Mother     Diabetes Father     Hypertension Father     Stroke Father     Heart attack Father     Asthma Father     COPD Father      Dementia Father     Heart disease Father     COPD Sister     Asthma Sister     Cancer Sister         Nasal cell skin    Brain cancer Sister     Diabetes Sister     Stroke Sister     Heart attack Sister     Endometriosis Sister     Depression Sister     Arthritis Sister         Rheumatoid    Hypertension Sister     Kidney disease Sister     Diabetes Sister     COPD Sister     Asthma Sister     Endometriosis Sister     Depression Sister     Cancer Sister         Glioma    Heart disease Sister     Heart attack Sister     Endometriosis Sister     Asthma Sister     Hypertension Sister     Kidney disease Sister         ESRD    COPD Brother     Asthma Brother         Turned into COPD    Diabetes Brother     Asthma Brother     COPD Brother     Diabetes Brother     Hypertension Brother     Asthma Daughter     Endometriosis Daughter     Osteoarthritis Daughter     Hypertension Daughter     Kidney failure Daughter     Irritable bowel syndrome Daughter     Anxiety disorder Daughter     Bipolar disorder Daughter     Depression Daughter     Self-Injurious Behavior  Daughter     Suicide Attempts Daughter     Mental illness Daughter         Bipolar and eating fisorder    Asthma Daughter     Endometriosis Daughter     Osteoarthritis Daughter     Asthma Son     ADD / ADHD Son     Alcohol abuse Son     Drug abuse Son     Breast cancer Maternal Cousin     Heart disease Maternal Grandfather     Heart disease Maternal Uncle     OCD Neg Hx     Paranoid behavior Neg Hx     Schizophrenia Neg Hx     Seizures Neg Hx     Ovarian cancer Neg Hx          SOCIAL HISTORY  Social History     Socioeconomic History    Marital status:    Tobacco Use    Smoking status: Never     Passive exposure: Never    Smokeless tobacco: Never   Vaping Use    Vaping status: Never Used   Substance and Sexual Activity    Alcohol use: Yes     Comment: ONCE A YEAR    Drug use: Never    Sexual activity: Defer         ALLERGIES  Cefaclor, Cephalexin, Cephalosporins,  Escitalopram oxalate, Ambien [zolpidem tartrate], Cardizem [diltiazem hcl], Metoclopramide, Mobic [meloxicam], Penicillins, Sumatriptan, Topamax [topiramate], Verapamil, Zolpidem, Amoxicillin, Edecrin [ethacrynic acid], Hydrochlorothiazide, and Sulfa antibiotics        REVIEW OF SYSTEMS  Review of Systems     All systems reviewed and negative except for those discussed in HPI.       PHYSICAL EXAM    I have reviewed the triage vital signs and nursing notes.    ED Triage Vitals [02/12/25 0527]   Temp Heart Rate Resp BP SpO2   98.7 °F (37.1 °C) 77 22 147/82 95 %      Temp src Heart Rate Source Patient Position BP Location FiO2 (%)   Oral Monitor Sitting Left arm --       Physical Exam    General:  Awake, alert, elderly appearing, visible increased work of breathing  HEENT: Atraumatic, normocephalic, EOMI, PERRLA, mucous membranes moist  NECK:  Supple, atraumatic  Cardiovascular:  Regular rate, regular rhythm, no murmurs, rubs, or gallops.  Extremities well perfused   Respiratory: Expiratory wheezing.  Frequent cough with audible congestion  Abdominal:  Soft, nondistended, nontender.  No guarding or rebound.  No palpable masses  Extremity:  No visible bony abnormalities in all 4 extremities.  Full range of motion of all extremities.  Skin:  Warm and dry.  No rashes  Neuro:  AAOx3, GCS 15. Cranial nerves 2-12 grossly intact.  No focal strength or sensation deficits.  Psych:  Mood and affect appropriate.        LAB RESULTS  Recent Results (from the past 24 hours)   Comprehensive Metabolic Panel    Collection Time: 02/12/25  5:42 AM    Specimen: Blood   Result Value Ref Range    Glucose 121 (H) 65 - 99 mg/dL    BUN 14 8 - 23 mg/dL    Creatinine 1.04 (H) 0.57 - 1.00 mg/dL    Sodium 130 (L) 136 - 145 mmol/L    Potassium 4.2 3.5 - 5.2 mmol/L    Chloride 95 (L) 98 - 107 mmol/L    CO2 24.9 22.0 - 29.0 mmol/L    Calcium 9.2 8.6 - 10.5 mg/dL    Total Protein 7.6 6.0 - 8.5 g/dL    Albumin 4.3 3.5 - 5.2 g/dL    ALT (SGPT) 20 1 - 33  U/L    AST (SGOT) 28 1 - 32 U/L    Alkaline Phosphatase 65 39 - 117 U/L    Total Bilirubin 0.3 0.0 - 1.2 mg/dL    Globulin 3.3 gm/dL    A/G Ratio 1.3 g/dL    BUN/Creatinine Ratio 13.5 7.0 - 25.0    Anion Gap 10.1 5.0 - 15.0 mmol/L    eGFR 58.3 (L) >60.0 mL/min/1.73   High Sensitivity Troponin T    Collection Time: 02/12/25  5:42 AM    Specimen: Blood   Result Value Ref Range    HS Troponin T 22 (H) <14 ng/L   BNP    Collection Time: 02/12/25  5:42 AM    Specimen: Blood   Result Value Ref Range    proBNP 2,637.0 (H) 0.0 - 900.0 pg/mL   CBC Auto Differential    Collection Time: 02/12/25  5:42 AM    Specimen: Blood   Result Value Ref Range    WBC 6.16 3.40 - 10.80 10*3/mm3    RBC 4.03 3.77 - 5.28 10*6/mm3    Hemoglobin 13.2 12.0 - 15.9 g/dL    Hematocrit 37.5 34.0 - 46.6 %    MCV 93.1 79.0 - 97.0 fL    MCH 32.8 26.6 - 33.0 pg    MCHC 35.2 31.5 - 35.7 g/dL    RDW 12.4 12.3 - 15.4 %    RDW-SD 42.5 37.0 - 54.0 fl    MPV 9.1 6.0 - 12.0 fL    Platelets 228 140 - 450 10*3/mm3    Neutrophil % 80.5 (H) 42.7 - 76.0 %    Lymphocyte % 8.8 (L) 19.6 - 45.3 %    Monocyte % 9.6 5.0 - 12.0 %    Eosinophil % 0.0 (L) 0.3 - 6.2 %    Basophil % 0.5 0.0 - 1.5 %    Immature Grans % 0.6 (H) 0.0 - 0.5 %    Neutrophils, Absolute 4.96 1.70 - 7.00 10*3/mm3    Lymphocytes, Absolute 0.54 (L) 0.70 - 3.10 10*3/mm3    Monocytes, Absolute 0.59 0.10 - 0.90 10*3/mm3    Eosinophils, Absolute 0.00 0.00 - 0.40 10*3/mm3    Basophils, Absolute 0.03 0.00 - 0.20 10*3/mm3    Immature Grans, Absolute 0.04 0.00 - 0.05 10*3/mm3    nRBC 0.0 0.0 - 0.2 /100 WBC   COVID-19 and FLU A/B PCR, 1 HR TAT - Swab, Nasopharynx    Collection Time: 02/12/25  5:43 AM    Specimen: Nasopharynx; Swab   Result Value Ref Range    COVID19 Not Detected Not Detected - Ref. Range    Influenza A PCR Detected (A) Not Detected    Influenza B PCR Not Detected Not Detected       If labs were ordered, I independently evaluated the results and considered them in treating the  patient.        RADIOLOGY  No Radiology Exams Resulted Within Past 24 Hours    I ordered and independently evaluated the above noted radiographic studies.     See radiologist's dictation for official interpretation.        PROCEDURES    Procedures    ECG 12 Lead Dyspnea   Final Result          MEDICATIONS GIVEN IN ER    Medications   sodium chloride 0.9 % flush 10 mL (has no administration in time range)   Pharmacy to Dose Oseltamivir (TAMIFLU) (has no administration in time range)   oseltamivir (TAMIFLU) capsule 30 mg (has no administration in time range)   ipratropium-albuterol (DUO-NEB) nebulizer solution 3 mL (3 mL Nebulization Given 2/12/25 0548)   albuterol (PROVENTIL) nebulizer solution 0.083% 2.5 mg/3mL (10 mg Nebulization Given 2/12/25 0548)   magnesium sulfate in D5W 1g/100mL (PREMIX) (0 g Intravenous Stopped 2/12/25 0635)   dexAMETHasone sodium phosphate injection 10 mg (10 mg Intravenous Given 2/12/25 0557)   Hydrocod Gabriel-Chlorphe Gabriel ER (TUSSIONEX PENNKINETIC) 10-8 MG/5ML ER suspension 5 mL (5 mL Oral Given 2/12/25 0650)   oseltamivir (TAMIFLU) capsule 75 mg (75 mg Oral Given 2/12/25 0649)         MEDICAL DECISION MAKING, PROGRESS, and CONSULTS    All labs, if obtained, have been independently reviewed by me.  All radiology studies, if obtained, have been evaluated by me and the radiologist dictating the report.  All EKG's, if obtained, have been independently viewed and interpreted by me.      Discussion below represents my analysis of pertinent findings related to patient's condition, differential diagnosis, treatment plan and final disposition.    Raquel Mariee is a 69 y.o. female who presents to the ED c/o difficulty breathing/shortness of breath.  Hemodynamically stable on arrival with oxygen saturation of 95% on room air.  Differential includes was not limited to viral URI, bronchitis, pneumonia, asthma/COPD exacerbation, pulmonary edema.  Extensive labs ordered along with chest x-ray and  EKG.  Patient given DuoNeb treatment along with 1 hour continuous albuterol nebulizer treatment.  Patient also given dose of IV dexamethasone.    EKG personally interpreted showing normal sinus rhythm with rate of 83 with no evidence of acute ischemic changes or significant interval abnormalities.    Chest x-ray obtained which was personally visualized and interpreted showing blunting of right costophrenic angle with diffuse opacifications (worse in the right lung than left) consistent with edema versus atypical infection.    Labs show no significant leukocytosis.  No critical electrolyte abnormalities.  No significant anemia.  Mildly elevated troponin and mildly elevated proBNP of 2637.  Influenza A positive.  Patient feeling better following treatment and still maintaining oxygen saturations above 94% on room air.  Patient given Tussionex to help with cough prevention along with prescription for cough suppressant to continue as an outpatient.  Advised on continued use of prednisone for the next 4 days, next dose to be given on 2/13.  Also provided patient with Tamiflu and prescription to continue for the next 5 days.  Advised on strict return precautions.  Patient requested to be discharged home at this time.                           Orders placed during this visit:  Orders Placed This Encounter   Procedures    COVID-19 and FLU A/B PCR, 1 HR TAT - Swab, Nasopharynx    XR Chest 1 View    Comprehensive Metabolic Panel    High Sensitivity Troponin T    BNP    CBC Auto Differential    ECG 12 Lead Dyspnea    Insert Peripheral IV    CBC & Differential         ED Course:    Consultants: None                Shared Decision Making:  After my consideration of clinical presentation and any laboratory/radiology studies obtained, I discussed the findings with the patient/patient representative who is in agreement with the treatment plan and the final disposition.   Risks and benefits of discharge and/or observation/admission  were discussed.      AS OF 07:23 EST VITALS:    BP - 122/68  HR - 98  TEMP - 98.7 °F (37.1 °C) (Oral)  O2 SATS - 94%                  DIAGNOSIS  Final diagnoses:   Influenza A   COPD with acute exacerbation   Bronchitis with influenza         DISPOSITION  Discharge      Please note that portions of this document were completed with voice recognition software.        Darryn Schaffer MD  02/12/25 2293

## 2025-02-12 NOTE — DISCHARGE INSTRUCTIONS
Continue prednisone as prescribed for the next 4 days, next dose to be taken on 2/13.  May continue guaifenesin/codeine cough syrup as prescribed to help with cough and congestion.  Follow-up with primary care provider.  Return emergency department if difficulty breathing continues to worsen despite treatment.

## 2025-02-13 ENCOUNTER — PATIENT MESSAGE (OUTPATIENT)
Dept: INTERNAL MEDICINE | Facility: CLINIC | Age: 70
End: 2025-02-13
Payer: MEDICARE

## 2025-02-15 DIAGNOSIS — J45.50 SEVERE PERSISTENT ASTHMA WITHOUT COMPLICATION: ICD-10-CM

## 2025-02-15 RX ORDER — IPRATROPIUM BROMIDE AND ALBUTEROL SULFATE 2.5; .5 MG/3ML; MG/3ML
SOLUTION RESPIRATORY (INHALATION)
Qty: 90 ML | Refills: 3 | Status: CANCELLED | OUTPATIENT
Start: 2025-02-15

## 2025-02-17 ENCOUNTER — TELEPHONE (OUTPATIENT)
Dept: PULMONOLOGY | Facility: CLINIC | Age: 70
End: 2025-02-17
Payer: MEDICARE

## 2025-02-17 DIAGNOSIS — J45.50 SEVERE PERSISTENT ASTHMA WITHOUT COMPLICATION: ICD-10-CM

## 2025-02-17 RX ORDER — LEVOFLOXACIN 750 MG/1
750 TABLET, FILM COATED ORAL DAILY
Qty: 7 TABLET | Refills: 0 | Status: SHIPPED | OUTPATIENT
Start: 2025-02-17

## 2025-02-17 RX ORDER — IPRATROPIUM BROMIDE AND ALBUTEROL SULFATE 2.5; .5 MG/3ML; MG/3ML
SOLUTION RESPIRATORY (INHALATION)
Qty: 90 ML | Refills: 3 | Status: SHIPPED | OUTPATIENT
Start: 2025-02-17

## 2025-02-21 ENCOUNTER — HOSPITAL ENCOUNTER (OUTPATIENT)
Facility: HOSPITAL | Age: 70
Discharge: HOME | End: 2025-02-21
Payer: MEDICARE

## 2025-02-28 RX ORDER — PREDNISONE 20 MG/1
40 TABLET ORAL DAILY
Qty: 10 TABLET | Refills: 0 | Status: SHIPPED | OUTPATIENT
Start: 2025-02-28

## 2025-03-03 RX ORDER — EPINEPHRINE 0.3 MG/.3ML
0.3 INJECTION SUBCUTANEOUS ONCE AS NEEDED
Status: CANCELLED
Start: 2025-03-03

## 2025-03-03 RX ORDER — PREDNISONE 20 MG/1
40 TABLET ORAL ONCE
Status: CANCELLED
Start: 2025-03-03 | End: 2025-03-03

## 2025-03-03 RX ORDER — METHYLPREDNISOLONE SODIUM SUCCINATE 125 MG/2ML
50 INJECTION, POWDER, LYOPHILIZED, FOR SOLUTION INTRAMUSCULAR; INTRAVENOUS ONCE AS NEEDED
Status: CANCELLED
Start: 2025-03-03

## 2025-03-03 RX ORDER — DIPHENHYDRAMINE HCL 25 MG
50 CAPSULE ORAL ONCE
Status: CANCELLED
Start: 2025-03-03 | End: 2025-03-03

## 2025-03-03 RX ORDER — PREDNISONE 20 MG/1
40 TABLET ORAL ONCE AS NEEDED
Status: CANCELLED
Start: 2025-03-03

## 2025-03-03 RX ORDER — ACETAMINOPHEN 325 MG/1
325 TABLET ORAL ONCE
Status: CANCELLED | OUTPATIENT
Start: 2025-03-03

## 2025-03-03 RX ORDER — DIPHENHYDRAMINE HCL 25 MG
50 CAPSULE ORAL ONCE AS NEEDED
Status: CANCELLED
Start: 2025-03-03

## 2025-03-04 ENCOUNTER — HOSPITAL ENCOUNTER (OUTPATIENT)
Facility: HOSPITAL | Age: 70
Discharge: HOME | End: 2025-03-04
Admitting: ALLERGY & IMMUNOLOGY
Payer: MEDICARE

## 2025-03-04 VITALS
HEART RATE: 90 BPM | OXYGEN SATURATION: 98 % | BODY MASS INDEX: 35.25 KG/M2 | TEMPERATURE: 98.1 F | RESPIRATION RATE: 16 BRPM | DIASTOLIC BLOOD PRESSURE: 87 MMHG | SYSTOLIC BLOOD PRESSURE: 160 MMHG | WEIGHT: 211.8 LBS

## 2025-03-04 DIAGNOSIS — Z95.828 PORT-A-CATH IN PLACE: ICD-10-CM

## 2025-03-04 DIAGNOSIS — D80.1 COMMON VARIABLE AGAMMAGLOBULINEMIA: Primary | ICD-10-CM

## 2025-03-04 LAB
25(OH)D3 SERPL-MCNC: 60.3 NG/ML (ref 30–100)
ALBUMIN SERPL-MCNC: 3.9 G/DL (ref 3.5–5.2)
ALBUMIN/GLOB SERPL: 1.6 G/DL
ALP SERPL-CCNC: 55 U/L (ref 39–117)
ALT SERPL W P-5'-P-CCNC: 16 U/L (ref 1–33)
ANION GAP SERPL CALCULATED.3IONS-SCNC: 11.1 MMOL/L (ref 5–15)
AST SERPL-CCNC: 24 U/L (ref 1–32)
BASOPHILS # BLD AUTO: 0.06 10*3/MM3 (ref 0–0.2)
BASOPHILS NFR BLD AUTO: 0.7 % (ref 0–1.5)
BILIRUB SERPL-MCNC: 0.4 MG/DL (ref 0–1.2)
BUN SERPL-MCNC: 9 MG/DL (ref 8–23)
BUN/CREAT SERPL: 8.1 (ref 7–25)
CALCIUM SPEC-SCNC: 8.8 MG/DL (ref 8.6–10.5)
CHLORIDE SERPL-SCNC: 99 MMOL/L (ref 98–107)
CO2 SERPL-SCNC: 21.9 MMOL/L (ref 22–29)
CREAT SERPL-MCNC: 1.11 MG/DL (ref 0.57–1)
DEPRECATED RDW RBC AUTO: 42.9 FL (ref 37–54)
EGFRCR SERPLBLD CKD-EPI 2021: 53.9 ML/MIN/1.73
EOSINOPHIL # BLD AUTO: 0.04 10*3/MM3 (ref 0–0.4)
EOSINOPHIL NFR BLD AUTO: 0.5 % (ref 0.3–6.2)
ERYTHROCYTE [DISTWIDTH] IN BLOOD BY AUTOMATED COUNT: 12.6 % (ref 12.3–15.4)
GLOBULIN UR ELPH-MCNC: 2.4 GM/DL
GLUCOSE SERPL-MCNC: 197 MG/DL (ref 65–99)
HBA1C MFR BLD: 6.3 % (ref 4.8–5.6)
HCT VFR BLD AUTO: 34.6 % (ref 34–46.6)
HGB BLD-MCNC: 11.9 G/DL (ref 12–15.9)
IMM GRANULOCYTES # BLD AUTO: 0.05 10*3/MM3 (ref 0–0.05)
IMM GRANULOCYTES NFR BLD AUTO: 0.6 % (ref 0–0.5)
LYMPHOCYTES # BLD AUTO: 0.76 10*3/MM3 (ref 0.7–3.1)
LYMPHOCYTES NFR BLD AUTO: 9.3 % (ref 19.6–45.3)
MCH RBC QN AUTO: 31.9 PG (ref 26.6–33)
MCHC RBC AUTO-ENTMCNC: 34.4 G/DL (ref 31.5–35.7)
MCV RBC AUTO: 92.8 FL (ref 79–97)
MONOCYTES # BLD AUTO: 0.4 10*3/MM3 (ref 0.1–0.9)
MONOCYTES NFR BLD AUTO: 4.9 % (ref 5–12)
NEUTROPHILS NFR BLD AUTO: 6.88 10*3/MM3 (ref 1.7–7)
NEUTROPHILS NFR BLD AUTO: 84 % (ref 42.7–76)
NRBC BLD AUTO-RTO: 0 /100 WBC (ref 0–0.2)
PLATELET # BLD AUTO: 215 10*3/MM3 (ref 140–450)
PMV BLD AUTO: 8.6 FL (ref 6–12)
POTASSIUM SERPL-SCNC: 4.3 MMOL/L (ref 3.5–5.2)
PROT SERPL-MCNC: 6.3 G/DL (ref 6–8.5)
RBC # BLD AUTO: 3.73 10*6/MM3 (ref 3.77–5.28)
SODIUM SERPL-SCNC: 132 MMOL/L (ref 136–145)
T4 FREE SERPL-MCNC: 1.33 NG/DL (ref 0.92–1.68)
TSH SERPL DL<=0.05 MIU/L-ACNC: 3.28 UIU/ML (ref 0.27–4.2)
VIT B12 BLD-MCNC: 1582 PG/ML (ref 211–946)
WBC NRBC COR # BLD AUTO: 8.19 10*3/MM3 (ref 3.4–10.8)

## 2025-03-04 PROCEDURE — 82043 UR ALBUMIN QUANTITATIVE: CPT | Performed by: INTERNAL MEDICINE

## 2025-03-04 PROCEDURE — 82306 VITAMIN D 25 HYDROXY: CPT | Performed by: INTERNAL MEDICINE

## 2025-03-04 PROCEDURE — 82607 VITAMIN B-12: CPT | Performed by: INTERNAL MEDICINE

## 2025-03-04 PROCEDURE — 25010000002 IMMUNE GLOBULIN (HUMAN) 20 GM/200ML SOLUTION: Performed by: ALLERGY & IMMUNOLOGY

## 2025-03-04 PROCEDURE — 84439 ASSAY OF FREE THYROXINE: CPT | Performed by: INTERNAL MEDICINE

## 2025-03-04 PROCEDURE — 83036 HEMOGLOBIN GLYCOSYLATED A1C: CPT | Performed by: INTERNAL MEDICINE

## 2025-03-04 PROCEDURE — 25010000002 HEPARIN LOCK FLUSH PER 10 UNITS: Performed by: ALLERGY & IMMUNOLOGY

## 2025-03-04 PROCEDURE — 96365 THER/PROPH/DIAG IV INF INIT: CPT

## 2025-03-04 PROCEDURE — 96366 THER/PROPH/DIAG IV INF ADDON: CPT

## 2025-03-04 PROCEDURE — 80053 COMPREHEN METABOLIC PANEL: CPT | Performed by: INTERNAL MEDICINE

## 2025-03-04 PROCEDURE — 85025 COMPLETE CBC W/AUTO DIFF WBC: CPT | Performed by: INTERNAL MEDICINE

## 2025-03-04 PROCEDURE — 82570 ASSAY OF URINE CREATININE: CPT | Performed by: INTERNAL MEDICINE

## 2025-03-04 PROCEDURE — 96361 HYDRATE IV INFUSION ADD-ON: CPT

## 2025-03-04 PROCEDURE — 25810000003 SODIUM CHLORIDE 0.9 % SOLUTION: Performed by: ALLERGY & IMMUNOLOGY

## 2025-03-04 PROCEDURE — 84443 ASSAY THYROID STIM HORMONE: CPT | Performed by: INTERNAL MEDICINE

## 2025-03-04 RX ORDER — EPINEPHRINE 1 MG/ML
0.3 INJECTION, SOLUTION INTRAMUSCULAR; SUBCUTANEOUS ONCE AS NEEDED
Start: 2025-03-18

## 2025-03-04 RX ORDER — DIPHENHYDRAMINE HCL 25 MG
50 CAPSULE ORAL ONCE AS NEEDED
Start: 2025-03-18

## 2025-03-04 RX ORDER — METHYLPREDNISOLONE SODIUM SUCCINATE 125 MG/2ML
50 INJECTION, POWDER, LYOPHILIZED, FOR SOLUTION INTRAMUSCULAR; INTRAVENOUS ONCE AS NEEDED
Start: 2025-03-18

## 2025-03-04 RX ORDER — SODIUM CHLORIDE 0.9 % (FLUSH) 0.9 %
10 SYRINGE (ML) INJECTION AS NEEDED
Status: DISCONTINUED | OUTPATIENT
Start: 2025-03-04 | End: 2025-03-05 | Stop reason: HOSPADM

## 2025-03-04 RX ORDER — HEPARIN SODIUM (PORCINE) LOCK FLUSH IV SOLN 100 UNIT/ML 100 UNIT/ML
500 SOLUTION INTRAVENOUS AS NEEDED
Status: DISCONTINUED | OUTPATIENT
Start: 2025-03-04 | End: 2025-03-05 | Stop reason: HOSPADM

## 2025-03-04 RX ORDER — PREDNISONE 20 MG/1
40 TABLET ORAL ONCE AS NEEDED
Start: 2025-03-18

## 2025-03-04 RX ORDER — ACETAMINOPHEN 325 MG/1
325 TABLET ORAL ONCE
OUTPATIENT
Start: 2025-03-18

## 2025-03-04 RX ORDER — PREDNISONE 20 MG/1
40 TABLET ORAL ONCE
Start: 2025-03-18 | End: 2025-03-18

## 2025-03-04 RX ORDER — DIPHENHYDRAMINE HCL 25 MG
50 CAPSULE ORAL ONCE
Start: 2025-03-18 | End: 2025-03-18

## 2025-03-04 RX ADMIN — HEPARIN 500 UNITS: 100 SYRINGE at 13:06

## 2025-03-04 RX ADMIN — Medication 10 ML: at 13:06

## 2025-03-04 RX ADMIN — SODIUM CHLORIDE 500 ML: 9 INJECTION, SOLUTION INTRAVENOUS at 09:08

## 2025-03-04 RX ADMIN — IMMUNE GLOBULIN INFUSION (HUMAN) 20 G: 100 INJECTION, SOLUTION INTRAVENOUS; SUBCUTANEOUS at 09:44

## 2025-03-04 RX ADMIN — IMMUNE GLOBULIN INFUSION (HUMAN) 20 G: 100 INJECTION, SOLUTION INTRAVENOUS; SUBCUTANEOUS at 11:51

## 2025-03-05 ENCOUNTER — SPECIALTY PHARMACY (OUTPATIENT)
Dept: PHARMACY | Facility: HOSPITAL | Age: 70
End: 2025-03-05
Payer: MEDICARE

## 2025-03-05 DIAGNOSIS — J45.50 SEVERE PERSISTENT ASTHMA WITHOUT COMPLICATION: Primary | ICD-10-CM

## 2025-03-05 LAB
ALBUMIN UR-MCNC: <1.2 MG/DL
CREAT UR-MCNC: 10.9 MG/DL
MICROALBUMIN/CREAT UR: NORMAL MG/G{CREAT}

## 2025-03-05 NOTE — PROGRESS NOTES
[facility]  Specialty Pharmacy Patient Management Program  Asthma Reassessment     Raquel Mariee is a 69 y.o. female with asthma seen by a pulmonology provider and enrolled in the Pulmonology Patient Management Program offered by [facility].  A follow-up outreach was conducted, including assessment of continued therapy appropriateness, medication adherence, and side effect incidence and management for Nucala.     Ms. Mariee stated that she continues to do well with Nucala injections. She reports no injection site reactions or ADRs with therapy. She endorses no missed doses and continues to receive Nucala through [facility] as required by her insurance.     Changes to Insurance Coverage or Financial Support  None     Relevant Past Medical History and Comorbidities  Relevant medical history and concomitant health conditions were discussed with the patient. The patient's chart has been reviewed for relevant past medical history and comorbid health conditions and updated as necessary.   Past Medical History:   Diagnosis Date    Anxiety     Aortic valve stenosis     Cardiac murmur     Cataract, bilateral     CHF (congestive heart failure)     Chronic kidney disease     Stage III    Clostridium difficile infection     in her 30s    Common variable immunodeficiency     IVIG infusions    COPD (chronic obstructive pulmonary disease)     Depression     Diabetes mellitus     type II    Eosinophilic asthma     Fibromyalgia, primary     Full dentures     GERD (gastroesophageal reflux disease)     History of transfusion     in [facility] at [facility].  No reaction noted, patient states she does have an antibody from transfusion.    Hypertension     Hypogammaglobulinemia     Hypothyroidism     Irritable bowel syndrome     Migraines     Morbid obesity     Optic neuritis     Optic neuropathy     Osteoarthritis     Prinzmetal angina 1990    Pseudomonas infection  1998    lungs    PTSD (post-traumatic stress disorder)     Pulmonary edema     Renal insufficiency     Sinus tachycardia 1990    Sleep apnea     no longer uses/needs cpap    Tachycardia     TIA (transient ischemic attack) 2017    Trochanteric bursitis 01/25/2023     Social History     Socioeconomic History    Marital status:    Tobacco Use    Smoking status: Never     Passive exposure: Never    Smokeless tobacco: Never   Vaping Use    Vaping status: Never Used   Substance and Sexual Activity    Alcohol use: Yes     Comment: ONCE A YEAR    Drug use: Never    Sexual activity: Defer     Problem list reviewed by Haydee Verma [facility] on 3/5/2025 at  3:50 PM    Hospitalizations and Urgent Care Since Last Assessment  ED Visits, Admissions, or Hospitalizations: N/A   Urgent Office Visits: N/A     Allergies  Known allergies and reactions were discussed with the patient. The patient's chart has been reviewed for allergy information and updated as necessary.   Allergies   Allergen Reactions    Cefaclor Hives and Swelling     Other reaction(s): SWELLING  Shed 4 layers of skin and extremely SOB  Cesario Bishop's syndrome        Cephalexin Other (See Comments)     Cesario-Bishop's syndrome      Cephalosporins Hives, Swelling and Angioedema     Rechallenge with cefipime caused rash on 1/14/08.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    Verapamil Nausea And Vomiting     Dizzy    Zolpidem Other (See Comments) and  Unknown (See Comments)     Automatic behaviour in sleep.  confusion    Amoxicillin Rash    Edecrin [Ethacrynic Acid] Rash and Other (See Comments)     Weight gain    Hydrochlorothiazide Hives    Sulfa Antibiotics Hives     Allergies reviewed by Haydee Verma [facility] on 3/5/2025 at  3:47 PM    Relevant Laboratory Values  Common labs          1/24/2025    09:03 2/12/2025    05:42 3/4/2025    09:00 3/4/2025    11:58   Common Labs   Glucose 141  121  197     BUN 14  14  9     Creatinine 1.11  1.04  1.11     Sodium 133  130  132     Potassium 4.0  4.2  4.3     Chloride 99  95  99     Calcium 8.6  9.2  8.8     Albumin 4.2  4.3  3.9     Total Bilirubin 0.4  0.3  0.4     Alkaline Phosphatase 55  65  55     AST (SGOT) 23  28  24     ALT (SGPT) 16  20  16     WBC  6.16  8.19     Hemoglobin  13.2  11.9     Hematocrit  37.5  34.6     Platelets  228  215     Hemoglobin A1C   6.30     Microalbumin, Urine    <1.2        Lab Assessment  The above labs have been reviewed. No dose adjustments are needed for the specialty medication(s) based on the labs.     Current Medication List  This medication list has been reviewed with the patient and evaluated for any interactions or necessary modifications/recommendations, and updated to include all prescription medications, OTC medications, and supplements the patient is currently taking.  This list reflects what is contained in the patient's profile, which has also been marked as reviewed to communicate to other providers it is the most up to date version of the patient's current medication therapy.     Current Outpatient Medications:     acetaminophen-codeine (TYLENOL with CODEINE #3) 300-30 MG per tablet, Take 1 tablet by mouth Every 4 (Four) Hours As Needed for Moderate Pain., Disp: 15 tablet, Rfl: 1    albuterol sulfate  (90 Base) MCG/ACT inhaler, Inhale 2 puffs by mouth every 4 to 6 hours as needed for cough, wheeze, shortness of breath. Use with vortex spacer, Disp: 8.5 g,  Rfl: 3    allopurinol (ZYLOPRIM) 100 MG tablet, Take 1 tablet by mouth Daily., Disp: 90 tablet, Rfl: 3    betamethasone valerate (VALISONE) 0.1 % cream, Apply topically to the appropriate area as directed Daily. (Patient taking differently: Apply  topically to the appropriate area as directed Daily As Needed.), Disp: 45 g, Rfl: 11    Budeson-Glycopyrrol-Formoterol (BREZTRI) 160-9-4.8 MCG/ACT aerosol inhaler, Inhale 2 puffs by mouth 2 (Two) Times a Day. Rinse out mouth after use, Disp: 32.1 g, Rfl: 3    buPROPion SR (WELLBUTRIN SR) 150 MG 12 hr tablet, Take 1 tablet by mouth 2 (Two) Times a Day., Disp: 180 tablet, Rfl: 3    calcium carbonate (Antacid Maximum) 1000 MG chewable tablet, Chew 500 mg 3 (Three) Times a Day., Disp: , Rfl:     cholecalciferol (VITAMIN D3) 25 MCG (1000 UT) tablet, Take 1 tablet by mouth Daily., Disp: , Rfl:     cloNIDine (Catapres) 0.1 MG tablet, Take 1 tablet by mouth 2 (Two) Times a Day., Disp: 180 tablet, Rfl: 1    denosumab (PROLIA) 60 MG/ML solution prefilled syringe syringe, Inject 1 mL under the skin into the appropriate area as directed Every 6 (Six) Months., Disp: , Rfl:     diphenhydrAMINE (Benadryl Allergy) 25 MG tablet, Take 1-2 tablets by mouth Every 4-6 Hours As Needed., Disp: 90 tablet, Rfl: 11    DULoxetine (Cymbalta) 60 MG capsule, Take 1 capsule by mouth Daily., Disp: 90 capsule, Rfl: 3    estradiol (ESTRACE) 0.1 MG/GM vaginal cream, Insert 0.5 grams vaginally twice weekly, Disp: 42.5 g, Rfl: 5    ferrous sulfate 325 (65 FE) MG EC tablet, Take 2 tablets by mouth Daily With Breakfast., Disp: , Rfl:     fexofenadine (ALLEGRA) 180 MG tablet, Take 1 tablet by mouth Daily., Disp: , Rfl:     Fluticasone Propionate (Xhance) 93 MCG/ACT Exhaler Suspension, Instill 1 spray in both nostrils twice a day, Disp: 16 mL, Rfl: 11    glucosamine-chondroitin 500-400 MG capsule capsule, Take 1 capsule by mouth 2 (Two) Times a Day With Meals., Disp: , Rfl:     guaiFENesin-codeine (ROMILAR-AC)  100-10 MG/5ML solution/syrup, Take 5 mL by mouth 3 (Three) Times a Day As Needed for Cough or Congestion., Disp: 90 mL, Rfl: 0    Hydrocortisone, Perianal, (Anusol-HC) 2.5 % rectal cream, Apply rectally 2 times daily, Disp: 30 g, Rfl: 2    Hypertonic Nasal Wash (Sinus Rinse Kit) pack, use once or twice daily as needed, Disp: 1 each, Rfl: 3    immune globulin, human, (Gammagard) 20 GM/200ML solution infusion, Infuse 40 g into a venous catheter Every 28 (Twenty-Eight) Days., Disp: 400 mL, Rfl: 0    immune globulin, human, (Gammagard) 30 GM/300ML solution infusion, 40 grams IV every 4 weeks, Disp: 40 mL, Rfl: 11    ipratropium (ATROVENT) 0.06 % nasal spray, Instill 1-2 sprays in each nostril 2-3 times daily as needed, Disp: 15 mL, Rfl: 3    ipratropium-albuterol (DUO-NEB) 0.5-2.5 mg/3 ml nebulizer, Inhale the contents of 1 vial through a nebulizer every 4-6 hours as needed for cough,wheezing and shortness of breath., Disp: 90 mL, Rfl: 3    isosorbide mononitrate (IMDUR) 30 MG 24 hr tablet, Take 1 tablet by mouth Every Morning., Disp: 90 tablet, Rfl: 0    levothyroxine (SYNTHROID, LEVOTHROID) 50 MCG tablet, Take 1 tablet by mouth Daily., Disp: 90 tablet, Rfl: 3    lidocaine (XYLOCAINE) 5 % ointment, Apply 1 Application topically to the appropriate area as directed Every 2 (Two) Hours As Needed for Mild Pain., Disp: 50 g, Rfl: 0    linaclotide (Linzess) 145 MCG capsule capsule, Take 1 capsule by mouth Every Morning Before Breakfast., Disp: 30 capsule, Rfl: 11    Magnesium 250 MG tablet, Take 2 tablets by mouth 2 (Two) Times a Day., Disp: , Rfl:     Mepolizumab (Nucala) 100 MG/ML solution auto-injector, Inject 1 mL under the skin into the appropriate area as directed Every 28 (Twenty-Eight) Days. [facility], Disp: , Rfl:     Misc. Devices (Sitz Bath) misc, Use 1 each As Needed (for hemorrhoids)., Disp: 1 each, Rfl: 0    montelukast (SINGULAIR) 10 MG tablet, Take 1 tablet by mouth every  night at bedtime., Disp: 90 tablet, Rfl: 3    multivitamin with minerals tablet tablet, Take 1 tablet by mouth Daily., Disp: , Rfl:     omeprazole (priLOSEC) 40 MG capsule, Take 1 capsule by mouth 2 (Two) Times a Day., Disp: 180 capsule, Rfl: 3    ondansetron (ZOFRAN) 4 MG tablet, Take 1 tablet by mouth Every 8 (Eight) Hours As Needed for Nausea or Vomiting., Disp: 30 tablet, Rfl: 11    potassium citrate (UROCIT-K) 10 MEQ (1080 MG) CR tablet, Take 1 tablet by mouth Daily., Disp: 90 tablet, Rfl: 3    predniSONE (DELTASONE) 20 MG tablet, Take 2 tablets prior to Gammgard infusion, Disp: 6 tablet, Rfl: 3    predniSONE (DELTASONE) 20 MG tablet, Take 2 tablets by mouth Daily., Disp: 10 tablet, Rfl: 0    tiZANidine (ZANAFLEX) 4 MG tablet, Take 1 tablet by mouth 2 (Two) Times a Day As Needed for Muscle Spasms., Disp: 100 tablet, Rfl: 3    ubrogepant (Ubrelvy) 100 MG tablet, Take 1 tablet by mouth 1 (One) Time As Needed (migraine)., Disp: 4 tablet, Rfl: 0    valsartan (DIOVAN) 160 MG tablet, Take 1 tablet by mouth Daily., Disp: 90 tablet, Rfl: 3    vitamin B-12 (CYANOCOBALAMIN) 1000 MCG tablet, Take 1 tablet by mouth Daily., Disp: , Rfl:   No current facility-administered medications for this visit.    Medicines reviewed by Haydee Verma RPH on 3/5/2025 at  3:47 PM  Medicines reviewed by Haydee Verma RPH on 3/5/2025 at  3:50 PM    Drug Interactions  N/A     Adverse Drug Reactions  Medication tolerability: Tolerating with no to minimal ADRs          Medication plan: Continue therapy with normal follow-up  Plan for ADR Management: N/A      Adherence, Self-Administration, and Current Therapy Problems  Adherence related patient's specialty therapy was discussed with the patient. The Adherence segment of this outreach has been reviewed and updated.   Adherence Questions  Linked Medication(s) Assessed: Mepolizumab (Nucala)  On average, how many doses/injections does the patient miss per month?: 0  What are the  identified reasons for non-adherence or missed doses? : no problems identified  What is the estimated medication adherence level?: %  Based on the patient/caregiver response and refill history, does this patient require an MTP to track adherence improvements?: no    Additional Barriers to Patient Self-Administration: N/A  Methods for Supporting Patient Self-Administration: N/A    Recently Close Medication Therapy Problems  No medication therapy recommendations to display  Open Medication Therapy Problems  No medication therapy recommendations to display     Goals of Therapy  Goals related to the patient's specialty therapy was discussed with the patient. The Patient Goals segment of this outreach has been reviewed and updated.    Goals Addressed Today        Specialty Pharmacy General Goal      Reduction in frequency of severe asthma symptoms to < 3 times per months  03/05/24: Patient reports significant improvement in asthma symptoms and has used rescue inhaler infrequently since starting Nucala. No missed doses. No issues with getting medication. No ADRs.               Quality of Life Assessment   Quality of Life related to the patient's enrollment in the patient management program and services provided was discussed with the patient. The QOL segment of this outreach has been reviewed and updated.   Quality of Life Improvement Scale: 10-Significantly better    Reassessment Plan & Follow-Up  Medication Therapy Changes: N/A  Related Plans, Therapy Recommendations, or Issues to Be Addressed: N/A  Pharmacist to perform regular reassessments no more than (6) months from the previous assessment.  Care Coordinator to set up future refill outreaches, coordinate prescription delivery, and escalate clinical questions to pharmacist.     Attestation  Therapeutic appropriateness: Appropriate  I attest the patient was actively involved in and has agreed to the above plan of care. If the prescribed therapy is at any point  deemed not appropriate based on the current or future assessments, a consultation will be initiated with the patient's specialty care provider to determine the best course of action. The revised plan of therapy will be documented along with any additional patient education provided. Discussed aforementioned material with patient by phone.    Haydee Verma, EricaD  3/5/2025   15:59 EST

## 2025-03-10 ENCOUNTER — HOSPITAL ENCOUNTER (OUTPATIENT)
Dept: GENERAL RADIOLOGY | Facility: HOSPITAL | Age: 70
Discharge: HOME | End: 2025-03-10
Payer: MEDICARE

## 2025-03-10 ENCOUNTER — OFFICE VISIT (OUTPATIENT)
Dept: CARDIOLOGY | Facility: CLINIC | Age: 70
End: 2025-03-10
Payer: MEDICARE

## 2025-03-10 ENCOUNTER — TRANSCRIBE ORDERS (OUTPATIENT)
Dept: GENERAL RADIOLOGY | Facility: HOSPITAL | Age: 70
End: 2025-03-10
Payer: MEDICARE

## 2025-03-10 VITALS
BODY MASS INDEX: 35.19 KG/M2 | SYSTOLIC BLOOD PRESSURE: 132 MMHG | WEIGHT: 211.2 LBS | DIASTOLIC BLOOD PRESSURE: 84 MMHG | HEART RATE: 88 BPM | OXYGEN SATURATION: 94 % | HEIGHT: 65 IN

## 2025-03-10 DIAGNOSIS — M25.552 BILATERAL HIP PAIN: Primary | ICD-10-CM

## 2025-03-10 DIAGNOSIS — M25.551 BILATERAL HIP PAIN: Primary | ICD-10-CM

## 2025-03-10 DIAGNOSIS — M25.552 BILATERAL HIP PAIN: ICD-10-CM

## 2025-03-10 DIAGNOSIS — M25.551 BILATERAL HIP PAIN: ICD-10-CM

## 2025-03-10 DIAGNOSIS — I20.1 PRINZMETAL ANGINA: ICD-10-CM

## 2025-03-10 DIAGNOSIS — N18.30 STAGE 3 CHRONIC KIDNEY DISEASE, UNSPECIFIED WHETHER STAGE 3A OR 3B CKD: ICD-10-CM

## 2025-03-10 DIAGNOSIS — I50.32 CHRONIC DIASTOLIC HEART FAILURE: ICD-10-CM

## 2025-03-10 DIAGNOSIS — I35.0 AORTIC STENOSIS, MODERATE: Primary | ICD-10-CM

## 2025-03-10 PROCEDURE — 3075F SYST BP GE 130 - 139MM HG: CPT | Performed by: INTERNAL MEDICINE

## 2025-03-10 PROCEDURE — 3079F DIAST BP 80-89 MM HG: CPT | Performed by: INTERNAL MEDICINE

## 2025-03-10 PROCEDURE — 99214 OFFICE O/P EST MOD 30 MIN: CPT | Performed by: INTERNAL MEDICINE

## 2025-03-10 PROCEDURE — 73521 X-RAY EXAM HIPS BI 2 VIEWS: CPT

## 2025-03-10 NOTE — PROGRESS NOTES
[facility] Cardiology Office Follow Up Note    Raquel Mariee  [id number]  03/10/2025    Primary Care Provider: Sanjeev Rawls MD    Chief Complaint: Routine follow-up    History of Present Illness:   Mrs. Raquel Mariee is a 69y.o. female who presents to the Cardiology Clinic for routine follow-up.   The patient has a past medical history significant for common variable immunodeficiency on chronic IVIG infusions, stage III chronic kidney disease, hypothyroidism, anemia, type 2 diabetes mellitus, and hypertension.  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  Her last appointment, the patient has been generally doing well from a cardiac standpoint.  She denies any significant exertional dyspnea.  She did have 2 isolated episodes of chest discomfort, which were mild and resolved within several minutes.  No chest pain associated with exertion.  No other specific complaints today.     Past Cardiac Testin. Last Coronary Angio: None  2. Prior Stress Testing: Unknown  3. Last Echo: 3/24  1.  Normal left ventricular size and systolic function, LVEF 65-70%.  2.  Mild concentric LVH.  3.  Grade 2 diastolic dysfunction.  4.  Normal right ventricular size and systolic function.  5.  Moderately increased left atrial volume index.  6.  Moderate calcification and thickening of the aortic valve with moderate aortic stenosis.  7.  Mild aortic regurgitation.  4. Prior Holter Monitor: None    Review of Systems:   Review of Systems   Constitutional:  Negative for activity change, appetite change, chills, diaphoresis, fatigue, fever, unexpected weight gain and unexpected weight loss.   Eyes:  Negative for blurred vision and double vision.   Respiratory:  Negative for cough, chest tightness, shortness of breath and wheezing.    Cardiovascular:  Positive for chest pain. Negative for palpitations  and leg swelling.   Gastrointestinal:  Negative for abdominal pain, anal bleeding, blood in stool and GERD.   Endocrine: Negative for cold intolerance and heat intolerance.   Genitourinary:  Negative for hematuria.   Neurological:  Negative for dizziness, syncope, weakness and light-headedness.   Hematological:  Does not bruise/bleed easily.   Psychiatric/Behavioral:  Negative for depressed mood and stress. The patient is not nervous/anxious.        I have reviewed and/or updated the patient's past medical, past surgical, family, social history, problem list and allergies as appropriate.     Medications:     Current Outpatient Medications:     acetaminophen-codeine (TYLENOL with CODEINE #3) 300-30 MG per tablet, Take 1 tablet by mouth Every 4 (Four) Hours As Needed for Moderate Pain., Disp: 15 tablet, Rfl: 1    albuterol sulfate  (90 Base) MCG/ACT inhaler, Inhale 2 puffs by mouth every 4 to 6 hours as needed for cough, wheeze, shortness of breath. Use with vortex spacer, Disp: 8.5 g, Rfl: 3    allopurinol (ZYLOPRIM) 100 MG tablet, Take 1 tablet by mouth Daily., Disp: 90 tablet, Rfl: 3    betamethasone valerate (VALISONE) 0.1 % cream, Apply topically to the appropriate area as directed Daily. (Patient taking differently: Apply  topically to the appropriate area as directed Daily As Needed.), Disp: 45 g, Rfl: 11    Budeson-Glycopyrrol-Formoterol (BREZTRI) 160-9-4.8 MCG/ACT aerosol inhaler, Inhale 2 puffs by mouth 2 (Two) Times a Day. Rinse out mouth after use, Disp: 32.1 g, Rfl: 3    buPROPion SR (WELLBUTRIN SR) 150 MG 12 hr tablet, Take 1 tablet by mouth 2 (Two) Times a Day., Disp: 180 tablet, Rfl: 3    calcium carbonate (Antacid Maximum) 1000 MG chewable tablet, Chew 500 mg 3 (Three) Times a Day., Disp: , Rfl:     cholecalciferol (VITAMIN D3) 25 MCG (1000 UT) tablet, Take 1 tablet by mouth Daily., Disp: , Rfl:     cloNIDine (Catapres) 0.1 MG tablet, Take 1 tablet by mouth 2 (Two) Times a Day., Disp: 180  tablet, Rfl: 1    denosumab (PROLIA) 60 MG/ML solution prefilled syringe syringe, Inject 1 mL under the skin into the appropriate area as directed Every 6 (Six) Months., Disp: , Rfl:     diphenhydrAMINE (Benadryl Allergy) 25 MG tablet, Take 1-2 tablets by mouth Every 4-6 Hours As Needed., Disp: 90 tablet, Rfl: 11    DULoxetine (Cymbalta) 60 MG capsule, Take 1 capsule by mouth Daily., Disp: 90 capsule, Rfl: 3    estradiol (ESTRACE) 0.1 MG/GM vaginal cream, Insert 0.5 grams vaginally twice weekly, Disp: 42.5 g, Rfl: 5    ferrous sulfate 325 (65 FE) MG EC tablet, Take 2 tablets by mouth Daily With Breakfast., Disp: , Rfl:     fexofenadine (ALLEGRA) 180 MG tablet, Take 1 tablet by mouth Daily., Disp: , Rfl:     Fluticasone Propionate (Xhance) 93 MCG/ACT Exhaler Suspension, Instill 1 spray in both nostrils twice a day, Disp: 16 mL, Rfl: 11    glucosamine-chondroitin 500-400 MG capsule capsule, Take 1 capsule by mouth 2 (Two) Times a Day With Meals., Disp: , Rfl:     guaiFENesin-codeine (ROMILAR-AC) 100-10 MG/5ML solution/syrup, Take 5 mL by mouth 3 (Three) Times a Day As Needed for Cough or Congestion., Disp: 90 mL, Rfl: 0    Hydrocortisone, Perianal, (Anusol-HC) 2.5 % rectal cream, Apply rectally 2 times daily, Disp: 30 g, Rfl: 2    Hypertonic Nasal Wash (Sinus Rinse Kit) pack, use once or twice daily as needed, Disp: 1 each, Rfl: 3    immune globulin, human, (Gammagard) 20 GM/200ML solution infusion, Infuse 40 g into a venous catheter Every 28 (Twenty-Eight) Days., Disp: 400 mL, Rfl: 0    immune globulin, human, (Gammagard) 30 GM/300ML solution infusion, 40 grams IV every 4 weeks, Disp: 40 mL, Rfl: 11    ipratropium (ATROVENT) 0.06 % nasal spray, Instill 1-2 sprays in each nostril 2-3 times daily as needed, Disp: 15 mL, Rfl: 3    ipratropium-albuterol (DUO-NEB) 0.5-2.5 mg/3 ml nebulizer, Inhale the contents of 1 vial through a nebulizer every 4-6 hours as needed for cough,wheezing and shortness of breath., Disp: 90 mL,  Rfl: 3    isosorbide mononitrate (IMDUR) 30 MG 24 hr tablet, Take 1 tablet by mouth Every Morning., Disp: 90 tablet, Rfl: 0    levothyroxine (SYNTHROID, LEVOTHROID) 50 MCG tablet, Take 1 tablet by mouth Daily., Disp: 90 tablet, Rfl: 3    lidocaine (XYLOCAINE) 5 % ointment, Apply 1 Application topically to the appropriate area as directed Every 2 (Two) Hours As Needed for Mild Pain., Disp: 50 g, Rfl: 0    linaclotide (Linzess) 145 MCG capsule capsule, Take 1 capsule by mouth Every Morning Before Breakfast., Disp: 30 capsule, Rfl: 11    Magnesium 250 MG tablet, Take 2 tablets by mouth 2 (Two) Times a Day., Disp: , Rfl:     Mepolizumab (Nucala) 100 MG/ML solution auto-injector, Inject 1 mL under the skin into the appropriate area as directed Every 28 (Twenty-Eight) Days. Obtaining medication from [place] to [facility] PAP, Disp: , Rfl:     Misc. Devices (Sitz Bath) misc, Use 1 each As Needed (for hemorrhoids)., Disp: 1 each, Rfl: 0    montelukast (SINGULAIR) 10 MG tablet, Take 1 tablet by mouth every night at bedtime., Disp: 90 tablet, Rfl: 3    multivitamin with minerals tablet tablet, Take 1 tablet by mouth Daily., Disp: , Rfl:     omeprazole (priLOSEC) 40 MG capsule, Take 1 capsule by mouth 2 (Two) Times a Day., Disp: 180 capsule, Rfl: 3    ondansetron (ZOFRAN) 4 MG tablet, Take 1 tablet by mouth Every 8 (Eight) Hours As Needed for Nausea or Vomiting., Disp: 30 tablet, Rfl: 11    potassium citrate (UROCIT-K) 10 MEQ (1080 MG) CR tablet, Take 1 tablet by mouth Daily., Disp: 90 tablet, Rfl: 3    predniSONE (DELTASONE) 20 MG tablet, Take 2 tablets prior to Gammgard infusion, Disp: 6 tablet, Rfl: 3    predniSONE (DELTASONE) 20 MG tablet, Take 2 tablets by mouth Daily., Disp: 10 tablet, Rfl: 0    tiZANidine (ZANAFLEX) 4 MG tablet, Take 1 tablet by mouth 2 (Two) Times a Day As Needed for Muscle Spasms., Disp: 100 tablet, Rfl: 3    ubrogepant (Ubrelvy) 100 MG tablet, Take 1 tablet by mouth 1 (One) Time As Needed (migraine).,  "Disp: 4 tablet, Rfl: 0    valsartan (DIOVAN) 160 MG tablet, Take 1 tablet by mouth Daily., Disp: 90 tablet, Rfl: 3    vitamin B-12 (CYANOCOBALAMIN) 1000 MCG tablet, Take 1 tablet by mouth Daily., Disp: , Rfl:     Physical Exam:  Vital Signs:   Vitals:    03/10/25 1415   BP: 132/84   BP Location: Right arm   Patient Position: Sitting   Cuff Size: Adult   Pulse: 88   SpO2: 94%   Weight: 95.8 kg (211 lb 3.2 oz)   Height: 165.1 cm (65\")       Physical Exam  Constitutional:       General: She is not in acute distress.     Appearance: Normal appearance. She is well-developed. She is not diaphoretic.   HENT:      Head: Normocephalic and atraumatic.   Eyes:      General: No scleral icterus.     Pupils: Pupils are equal, round, and reactive to light.   Neck:      Trachea: No tracheal deviation.   Cardiovascular:      Rate and Rhythm: Normal rate and regular rhythm.      Heart sounds: Normal heart sounds. No murmur heard.     No friction rub. No gallop.      Comments: 4/6 systolic murmur over the aortic area  Pulmonary:      Effort: Pulmonary effort is normal. No respiratory distress.      Breath sounds: Normal breath sounds. No stridor. No wheezing or rales.   Chest:      Chest wall: No tenderness.   Abdominal:      General: Bowel sounds are normal. There is no distension.      Palpations: Abdomen is soft.      Tenderness: There is no abdominal tenderness. There is no guarding or rebound.   Musculoskeletal:         General: No swelling. Normal range of motion.      Cervical back: Neck supple. No tenderness.   Lymphadenopathy:      Cervical: No cervical adenopathy.   Skin:     General: Skin is warm and dry.      Findings: No erythema.   Neurological:      General: No focal deficit present.      Mental Status: She is alert and oriented to person, place, and time.   Psychiatric:         Mood and Affect: Mood normal.         Behavior: Behavior normal.         Results Review:   I reviewed the patient's new clinical " results.        Assessment / Plan:     1. Aortic stenosis  -- Last echocardiogram showed aortic stenosis was moderate  -- Asymptomatic  -- Repeat echocardiogram for surveillance  --If progression to severe or symptomatic, we will consider referral for TAVR  --Follow-up in 6 months, sooner if required     2. Chronic diastolic heart failure  -- Euvolemic and well compensated     3.  Prinzmetal angina   -- Continue current antianginals     4. Common variable immunodeficiency   --Managed by immunology     5.  Chronic kidney disease, stage III  -- GFR 66 on last labs in 7/23     6.  Obesity, BMI 35 kg/m²  --Encouraged continued efforts at weight loss through diet and exercise       Follow Up:   Return in about 6 months (around 9/10/2025).      Thank you for allowing me to participate in the care of your patient. Please to not hesitate to contact me with additional questions or concerns.     SENTHIL Lovelace MD  Interventional Cardiology   03/10/2025  14:19 EDT

## 2025-03-27 ENCOUNTER — OFFICE VISIT (OUTPATIENT)
Dept: INTERNAL MEDICINE | Facility: CLINIC | Age: 70
End: 2025-03-27
Payer: MEDICARE

## 2025-03-27 VITALS
SYSTOLIC BLOOD PRESSURE: 140 MMHG | OXYGEN SATURATION: 97 % | TEMPERATURE: 96.8 F | HEART RATE: 92 BPM | DIASTOLIC BLOOD PRESSURE: 100 MMHG | BODY MASS INDEX: 34.32 KG/M2 | HEIGHT: 65 IN | RESPIRATION RATE: 16 BRPM | WEIGHT: 206 LBS

## 2025-03-27 DIAGNOSIS — E11.9 TYPE 2 DIABETES MELLITUS WITHOUT COMPLICATION, WITH LONG-TERM CURRENT USE OF INSULIN: ICD-10-CM

## 2025-03-27 DIAGNOSIS — Z00.00 ROUTINE GENERAL MEDICAL EXAMINATION AT A HEALTH CARE FACILITY: ICD-10-CM

## 2025-03-27 DIAGNOSIS — J32.0 MAXILLARY SINUSITIS, UNSPECIFIED CHRONICITY: ICD-10-CM

## 2025-03-27 DIAGNOSIS — Z79.4 TYPE 2 DIABETES MELLITUS WITHOUT COMPLICATION, WITH LONG-TERM CURRENT USE OF INSULIN: ICD-10-CM

## 2025-03-27 NOTE — PROGRESS NOTES
Subjective     Patient ID: Raquel Mariee is a 69 y.o. female. Patient is here for management of multiple medical problems.     Chief Complaint   Patient presents with    Insomnia    Hypertension     History of Present Illness   Insomnia      Hypertension  Clonidine. NAION is worse.      The following portions of the patient's history were reviewed and updated as appropriate: allergies, current medications, past family history, past medical history, past social history, past surgical history and problem list.    Review of Systems    Current Outpatient Medications:     acetaminophen-codeine (TYLENOL with CODEINE #3) 300-30 MG per tablet, Take 1 tablet by mouth Every 4 (Four) Hours As Needed for Moderate Pain., Disp: 15 tablet, Rfl: 1    albuterol sulfate  (90 Base) MCG/ACT inhaler, Inhale 2 puffs by mouth every 4 to 6 hours as needed for cough, wheeze, shortness of breath. Use with vortex spacer, Disp: 8.5 g, Rfl: 3    allopurinol (ZYLOPRIM) 100 MG tablet, Take 1 tablet by mouth Daily., Disp: 90 tablet, Rfl: 3    betamethasone valerate (VALISONE) 0.1 % cream, Apply topically to the appropriate area as directed Daily. (Patient taking differently: Apply  topically to the appropriate area as directed Daily As Needed.), Disp: 45 g, Rfl: 11    Budeson-Glycopyrrol-Formoterol (BREZTRI) 160-9-4.8 MCG/ACT aerosol inhaler, Inhale 2 puffs by mouth 2 (Two) Times a Day. Rinse out mouth after use, Disp: 32.1 g, Rfl: 3    buPROPion SR (WELLBUTRIN SR) 150 MG 12 hr tablet, Take 1 tablet by mouth 2 (Two) Times a Day., Disp: 180 tablet, Rfl: 3    calcium carbonate (Antacid Maximum) 1000 MG chewable tablet, Chew 500 mg 3 (Three) Times a Day., Disp: , Rfl:     cholecalciferol (VITAMIN D3) 25 MCG (1000 UT) tablet, Take 1 tablet by mouth Daily., Disp: , Rfl:     denosumab (PROLIA) 60 MG/ML solution prefilled syringe syringe, Inject 1 mL under the skin into the appropriate area as directed Every 6 (Six) Months., Disp: , Rfl:      diphenhydrAMINE (Benadryl Allergy) 25 MG tablet, Take 1-2 tablets by mouth Every 4-6 Hours As Needed., Disp: 90 tablet, Rfl: 11    DULoxetine (Cymbalta) 60 MG capsule, Take 1 capsule by mouth Daily., Disp: 90 capsule, Rfl: 3    estradiol (ESTRACE) 0.1 MG/GM vaginal cream, Insert 0.5 grams vaginally twice weekly, Disp: 42.5 g, Rfl: 5    ferrous sulfate 325 (65 FE) MG EC tablet, Take 2 tablets by mouth Daily With Breakfast., Disp: , Rfl:     fexofenadine (ALLEGRA) 180 MG tablet, Take 1 tablet by mouth Daily., Disp: , Rfl:     Fluticasone Propionate (Xhance) 93 MCG/ACT Exhaler Suspension, Instill 1 spray in both nostrils twice a day, Disp: 16 mL, Rfl: 11    glucosamine-chondroitin 500-400 MG capsule capsule, Take 1 capsule by mouth 2 (Two) Times a Day With Meals., Disp: , Rfl:     guaiFENesin-codeine (ROMILAR-AC) 100-10 MG/5ML solution/syrup, Take 5 mL by mouth 3 (Three) Times a Day As Needed for Cough or Congestion., Disp: 90 mL, Rfl: 0    Hydrocortisone, Perianal, (Anusol-HC) 2.5 % rectal cream, Apply rectally 2 times daily, Disp: 30 g, Rfl: 2    Hypertonic Nasal Wash (Sinus Rinse Kit) pack, use once or twice daily as needed, Disp: 1 each, Rfl: 3    immune globulin, human, (Gammagard) 20 GM/200ML solution infusion, Infuse 40 g into a venous catheter Every 28 (Twenty-Eight) Days., Disp: 400 mL, Rfl: 0    immune globulin, human, (Gammagard) 30 GM/300ML solution infusion, 40 grams IV every 4 weeks, Disp: 40 mL, Rfl: 11    ipratropium (ATROVENT) 0.06 % nasal spray, Instill 1-2 sprays in each nostril 2-3 times daily as needed, Disp: 15 mL, Rfl: 3    ipratropium-albuterol (DUO-NEB) 0.5-2.5 mg/3 ml nebulizer, Inhale the contents of 1 vial through a nebulizer every 4-6 hours as needed for cough,wheezing and shortness of breath., Disp: 90 mL, Rfl: 3    isosorbide mononitrate (IMDUR) 30 MG 24 hr tablet, Take 1 tablet by mouth Every Morning., Disp: 90 tablet, Rfl: 0    levothyroxine (SYNTHROID, LEVOTHROID) 50 MCG tablet, Take 1  tablet by mouth Daily., Disp: 90 tablet, Rfl: 3    lidocaine (XYLOCAINE) 5 % ointment, Apply 1 Application topically to the appropriate area as directed Every 2 (Two) Hours As Needed for Mild Pain., Disp: 50 g, Rfl: 0    linaclotide (Linzess) 145 MCG capsule capsule, Take 1 capsule by mouth Every Morning Before Breakfast., Disp: 30 capsule, Rfl: 11    Magnesium 250 MG tablet, Take 2 tablets by mouth 2 (Two) Times a Day., Disp: , Rfl:     Mepolizumab (Nucala) 100 MG/ML solution auto-injector, Inject 1 mL under the skin into the appropriate area as directed Every 28 (Twenty-Eight) Days. Obtaining medication from [place] to [facility] P[facility], Disp: , Rfl:     Misc. Devices (Sitz Bath) misc, Use 1 each As Needed (for hemorrhoids)., Disp: 1 each, Rfl: 0    montelukast (SINGULAIR) 10 MG tablet, Take 1 tablet by mouth every night at bedtime., Disp: 90 tablet, Rfl: 3    multivitamin with minerals tablet tablet, Take 1 tablet by mouth Daily., Disp: , Rfl:     omeprazole (priLOSEC) 40 MG capsule, Take 1 capsule by mouth 2 (Two) Times a Day., Disp: 180 capsule, Rfl: 3    ondansetron (ZOFRAN) 4 MG tablet, Take 1 tablet by mouth Every 8 (Eight) Hours As Needed for Nausea or Vomiting., Disp: 30 tablet, Rfl: 11    potassium citrate (UROCIT-K) 10 MEQ (1080 MG) CR tablet, Take 1 tablet by mouth Daily., Disp: 90 tablet, Rfl: 3    predniSONE (DELTASONE) 20 MG tablet, Take 2 tablets prior to Gammgard infusion, Disp: 6 tablet, Rfl: 3    predniSONE (DELTASONE) 20 MG tablet, Take 2 tablets by mouth Daily., Disp: 10 tablet, Rfl: 0    tiZANidine (ZANAFLEX) 4 MG tablet, Take 1 tablet by mouth 2 (Two) Times a Day As Needed for Muscle Spasms., Disp: 100 tablet, Rfl: 3    ubrogepant (Ubrelvy) 100 MG tablet, Take 1 tablet by mouth 1 (One) Time As Needed (migraine)., Disp: 5 tablet, Rfl: 0    valsartan (DIOVAN) 160 MG tablet, Take 1 tablet by mouth Daily., Disp: 90 tablet, Rfl: 3    vitamin B-12 (CYANOCOBALAMIN) 1000 MCG tablet, Take 1 tablet by mouth  "Daily., Disp: , Rfl:     Objective      Blood pressure 140/100, pulse 92, temperature 96.8 °F (36 °C), resp. rate 16, height 165.1 cm (65\"), weight 93.4 kg (206 lb), SpO2 97%, not currently breastfeeding.            Physical Exam     General Appearance:    Alert, cooperative, no distress, appears stated age   Head:    Normocephalic, without obvious abnormality, atraumatic   Eyes:    PERRL, conjunctiva/corneas clear, EOM's intact   Ears:    Normal TM's and external ear canals, both ears   Nose:   Nares normal, septum midline, mucosa normal, no drainage   or sinus tenderness   Throat:   Lips, mucosa, and tongue normal; teeth and gums normal   Neck:   Supple, symmetrical, trachea midline, no adenopathy;        thyroid:  No enlargement/tenderness/nodules; no carotid    bruit or JVD   Back:     Symmetric, no curvature, ROM normal, no CVA tenderness   Lungs:     Clear to auscultation bilaterally, respirations unlabored   Chest wall:    No tenderness or deformity   Heart:    Regular rate and rhythm, S1 and S2 normal, no murmur,        rub or gallop   Abdomen:     Soft, non-tender, bowel sounds active all four quadrants,     no masses, no organomegaly   Extremities:   Extremities normal, atraumatic, no cyanosis or edema   Pulses:   2+ and symmetric all extremities   Skin:   Skin color, texture, turgor normal, no rashes or lesions   Lymph nodes:   Cervical, supraclavicular, and axillary nodes normal   Neurologic:   CNII-XII intact. Normal strength, sensation and reflexes       throughout      Results for orders placed or performed during the hospital encounter of 02/12/25   Comprehensive Metabolic Panel    Collection Time: 02/12/25  5:42 AM    Specimen: Blood   Result Value Ref Range    Glucose 121 (H) 65 - 99 mg/dL    BUN 14 8 - 23 mg/dL    Creatinine 1.04 (H) 0.57 - 1.00 mg/dL    Sodium 130 (L) 136 - 145 mmol/L    Potassium 4.2 3.5 - 5.2 mmol/L    Chloride 95 (L) 98 - 107 mmol/L    CO2 24.9 22.0 - 29.0 mmol/L    Calcium " 9.2 8.6 - 10.5 mg/dL    Total Protein 7.6 6.0 - 8.5 g/dL    Albumin 4.3 3.5 - 5.2 g/dL    ALT (SGPT) 20 1 - 33 U/L    AST (SGOT) 28 1 - 32 U/L    Alkaline Phosphatase 65 39 - 117 U/L    Total Bilirubin 0.3 0.0 - 1.2 mg/dL    Globulin 3.3 gm/dL    A/G Ratio 1.3 g/dL    BUN/Creatinine Ratio 13.5 7.0 - 25.0    Anion Gap 10.1 5.0 - 15.0 mmol/L    eGFR 58.3 (L) >60.0 mL/min/1.73   High Sensitivity Troponin T    Collection Time: 02/12/25  5:42 AM    Specimen: Blood   Result Value Ref Range    HS Troponin T 22 (H) <14 ng/L   BNP    Collection Time: 02/12/25  5:42 AM    Specimen: Blood   Result Value Ref Range    proBNP 2,637.0 (H) 0.0 - 900.0 pg/mL   CBC Auto Differential    Collection Time: 02/12/25  5:42 AM    Specimen: Blood   Result Value Ref Range    WBC 6.16 3.40 - 10.80 10*3/mm3    RBC 4.03 3.77 - 5.28 10*6/mm3    Hemoglobin 13.2 12.0 - 15.9 g/dL    Hematocrit 37.5 34.0 - 46.6 %    MCV 93.1 79.0 - 97.0 fL    MCH 32.8 26.6 - 33.0 pg    MCHC 35.2 31.5 - 35.7 g/dL    RDW 12.4 12.3 - 15.4 %    RDW-SD 42.5 37.0 - 54.0 fl    MPV 9.1 6.0 - 12.0 fL    Platelets 228 140 - 450 10*3/mm3    Neutrophil % 80.5 (H) 42.7 - 76.0 %    Lymphocyte % 8.8 (L) 19.6 - 45.3 %    Monocyte % 9.6 5.0 - 12.0 %    Eosinophil % 0.0 (L) 0.3 - 6.2 %    Basophil % 0.5 0.0 - 1.5 %    Immature Grans % 0.6 (H) 0.0 - 0.5 %    Neutrophils, Absolute 4.96 1.70 - 7.00 10*3/mm3    Lymphocytes, Absolute 0.54 (L) 0.70 - 3.10 10*3/mm3    Monocytes, Absolute 0.59 0.10 - 0.90 10*3/mm3    Eosinophils, Absolute 0.00 0.00 - 0.40 10*3/mm3    Basophils, Absolute 0.03 0.00 - 0.20 10*3/mm3    Immature Grans, Absolute 0.04 0.00 - 0.05 10*3/mm3    nRBC 0.0 0.0 - 0.2 /100 WBC   COVID-19 and FLU A/B PCR, 1 HR TAT - Swab, Nasopharynx    Collection Time: 02/12/25  5:43 AM    Specimen: Nasopharynx; Swab   Result Value Ref Range    COVID19 Not Detected Not Detected - Ref. Range    Influenza A PCR Detected (A) Not Detected    Influenza B PCR Not Detected Not Detected     *Note:  Due to a large number of results and/or encounters for the requested time period, some results have not been displayed. A complete set of results can be found in Results Review.         Assessment & Plan     Opthalmology concern for low BP on clonidine at night .  Zanflex has same mechanism of actions.   Would advise to come off. Having trouble to go to sleep at night./  On welbutrin BID. Night time dose may be causing insomnia. Will stop evening dose.    Consider other options for sleep at night if needed.    Bp up. Zanaflex will case rebound htn. Will eval off med.      Diagnoses and all orders for this visit:    1. Maxillary sinusitis, unspecified chronicity  -     ubrogepant (Ubrelvy) 100 MG tablet; Take 1 tablet by mouth 1 (One) Time As Needed (migraine).  Dispense: 5 tablet; Refill: 0    2. Type 2 diabetes mellitus without complication, with long-term current use of insulin  -     ubrogepant (Ubrelvy) 100 MG tablet; Take 1 tablet by mouth 1 (One) Time As Needed (migraine).  Dispense: 5 tablet; Refill: 0    3. Routine general medical examination at a health care facility  -     ubrogepant (Ubrelvy) 100 MG tablet; Take 1 tablet by mouth 1 (One) Time As Needed (migraine).  Dispense: 5 tablet; Refill: 0      Return in about 2 weeks (around 4/10/2025).          There are no Patient Instructions on file for this visit.     Sanjeev Rawls MD    Assessment & Plan

## 2025-04-04 ENCOUNTER — HOSPITAL ENCOUNTER (OUTPATIENT)
Facility: HOSPITAL | Age: 70
Discharge: HOME | End: 2025-04-04
Payer: MEDICARE

## 2025-04-04 VITALS
TEMPERATURE: 98.2 F | HEART RATE: 87 BPM | BODY MASS INDEX: 34.16 KG/M2 | SYSTOLIC BLOOD PRESSURE: 145 MMHG | HEIGHT: 65 IN | RESPIRATION RATE: 18 BRPM | WEIGHT: 205 LBS | DIASTOLIC BLOOD PRESSURE: 85 MMHG | OXYGEN SATURATION: 97 %

## 2025-04-04 DIAGNOSIS — Z95.828 PORT-A-CATH IN PLACE: Primary | ICD-10-CM

## 2025-04-04 DIAGNOSIS — D80.1 COMMON VARIABLE AGAMMAGLOBULINEMIA: ICD-10-CM

## 2025-04-04 LAB
ALBUMIN SERPL-MCNC: 4.3 G/DL (ref 3.5–5.2)
ALBUMIN/GLOB SERPL: 1.5 G/DL
ALP SERPL-CCNC: 61 U/L (ref 39–117)
ALT SERPL W P-5'-P-CCNC: 20 U/L (ref 1–33)
ANION GAP SERPL CALCULATED.3IONS-SCNC: 11.6 MMOL/L (ref 5–15)
AST SERPL-CCNC: 28 U/L (ref 1–32)
BASOPHILS # BLD AUTO: 0.09 10*3/MM3 (ref 0–0.2)
BASOPHILS NFR BLD AUTO: 1 % (ref 0–1.5)
BILIRUB SERPL-MCNC: 0.6 MG/DL (ref 0–1.2)
BUN SERPL-MCNC: 17 MG/DL (ref 8–23)
BUN/CREAT SERPL: 13.3 (ref 7–25)
CALCIUM SPEC-SCNC: 9.5 MG/DL (ref 8.6–10.5)
CHLORIDE SERPL-SCNC: 100 MMOL/L (ref 98–107)
CO2 SERPL-SCNC: 24.4 MMOL/L (ref 22–29)
CREAT SERPL-MCNC: 1.28 MG/DL (ref 0.57–1)
DEPRECATED RDW RBC AUTO: 45.8 FL (ref 37–54)
EGFRCR SERPLBLD CKD-EPI 2021: 45.4 ML/MIN/1.73
EOSINOPHIL # BLD AUTO: 0.05 10*3/MM3 (ref 0–0.4)
EOSINOPHIL NFR BLD AUTO: 0.5 % (ref 0.3–6.2)
ERYTHROCYTE [DISTWIDTH] IN BLOOD BY AUTOMATED COUNT: 13.5 % (ref 12.3–15.4)
GLOBULIN UR ELPH-MCNC: 2.8 GM/DL
GLUCOSE SERPL-MCNC: 136 MG/DL (ref 65–99)
HCT VFR BLD AUTO: 40.8 % (ref 34–46.6)
HGB BLD-MCNC: 14 G/DL (ref 12–15.9)
IGG1 SER-MCNC: 1025 MG/DL (ref 700–1600)
IMM GRANULOCYTES # BLD AUTO: 0.09 10*3/MM3 (ref 0–0.05)
IMM GRANULOCYTES NFR BLD AUTO: 1 % (ref 0–0.5)
LYMPHOCYTES # BLD AUTO: 1.38 10*3/MM3 (ref 0.7–3.1)
LYMPHOCYTES NFR BLD AUTO: 15.2 % (ref 19.6–45.3)
MCH RBC QN AUTO: 31.9 PG (ref 26.6–33)
MCHC RBC AUTO-ENTMCNC: 34.3 G/DL (ref 31.5–35.7)
MCV RBC AUTO: 92.9 FL (ref 79–97)
MONOCYTES # BLD AUTO: 0.57 10*3/MM3 (ref 0.1–0.9)
MONOCYTES NFR BLD AUTO: 6.3 % (ref 5–12)
NEUTROPHILS NFR BLD AUTO: 6.92 10*3/MM3 (ref 1.7–7)
NEUTROPHILS NFR BLD AUTO: 76 % (ref 42.7–76)
NRBC BLD AUTO-RTO: 0 /100 WBC (ref 0–0.2)
PLATELET # BLD AUTO: 286 10*3/MM3 (ref 140–450)
PMV BLD AUTO: 9.2 FL (ref 6–12)
POTASSIUM SERPL-SCNC: 4.1 MMOL/L (ref 3.5–5.2)
PROT SERPL-MCNC: 7.1 G/DL (ref 6–8.5)
RBC # BLD AUTO: 4.39 10*6/MM3 (ref 3.77–5.28)
SODIUM SERPL-SCNC: 136 MMOL/L (ref 136–145)
WBC NRBC COR # BLD AUTO: 9.1 10*3/MM3 (ref 3.4–10.8)

## 2025-04-04 PROCEDURE — 25010000002 IMMUNE GLOBULIN (HUMAN) 20 GM/200ML SOLUTION: Performed by: ALLERGY & IMMUNOLOGY

## 2025-04-04 PROCEDURE — 96365 THER/PROPH/DIAG IV INF INIT: CPT

## 2025-04-04 PROCEDURE — 82784 ASSAY IGA/IGD/IGG/IGM EACH: CPT | Performed by: ALLERGY & IMMUNOLOGY

## 2025-04-04 PROCEDURE — 96361 HYDRATE IV INFUSION ADD-ON: CPT

## 2025-04-04 PROCEDURE — 96366 THER/PROPH/DIAG IV INF ADDON: CPT

## 2025-04-04 PROCEDURE — 85025 COMPLETE CBC W/AUTO DIFF WBC: CPT | Performed by: ALLERGY & IMMUNOLOGY

## 2025-04-04 PROCEDURE — 25010000002 HEPARIN LOCK FLUSH PER 10 UNITS: Performed by: ALLERGY & IMMUNOLOGY

## 2025-04-04 PROCEDURE — 80053 COMPREHEN METABOLIC PANEL: CPT | Performed by: ALLERGY & IMMUNOLOGY

## 2025-04-04 PROCEDURE — 25810000003 SODIUM CHLORIDE 0.9 % SOLUTION: Performed by: ALLERGY & IMMUNOLOGY

## 2025-04-04 RX ORDER — DIPHENHYDRAMINE HCL 25 MG
50 CAPSULE ORAL ONCE
Start: 2025-04-18 | End: 2025-04-18

## 2025-04-04 RX ORDER — PREDNISONE 20 MG/1
40 TABLET ORAL ONCE AS NEEDED
Status: DISCONTINUED | OUTPATIENT
Start: 2025-04-04 | End: 2025-04-05 | Stop reason: HOSPADM

## 2025-04-04 RX ORDER — ACETAMINOPHEN 325 MG/1
325 TABLET ORAL ONCE
OUTPATIENT
Start: 2025-04-18

## 2025-04-04 RX ORDER — HEPARIN SODIUM (PORCINE) LOCK FLUSH IV SOLN 100 UNIT/ML 100 UNIT/ML
500 SOLUTION INTRAVENOUS AS NEEDED
Status: DISCONTINUED | OUTPATIENT
Start: 2025-04-04 | End: 2025-04-05 | Stop reason: HOSPADM

## 2025-04-04 RX ORDER — PREDNISONE 20 MG/1
40 TABLET ORAL ONCE
Start: 2025-04-18 | End: 2025-04-18

## 2025-04-04 RX ORDER — DIPHENHYDRAMINE HCL 25 MG
50 CAPSULE ORAL ONCE AS NEEDED
Start: 2025-04-18

## 2025-04-04 RX ORDER — EPINEPHRINE 1 MG/ML
0.3 INJECTION, SOLUTION INTRAMUSCULAR; SUBCUTANEOUS ONCE AS NEEDED
Status: DISCONTINUED | OUTPATIENT
Start: 2025-04-04 | End: 2025-04-05 | Stop reason: HOSPADM

## 2025-04-04 RX ORDER — PREDNISONE 20 MG/1
40 TABLET ORAL ONCE AS NEEDED
Start: 2025-04-18

## 2025-04-04 RX ORDER — METHYLPREDNISOLONE SODIUM SUCCINATE 125 MG/2ML
50 INJECTION, POWDER, LYOPHILIZED, FOR SOLUTION INTRAMUSCULAR; INTRAVENOUS ONCE AS NEEDED
Start: 2025-04-18

## 2025-04-04 RX ORDER — DIPHENHYDRAMINE HCL 25 MG
50 CAPSULE ORAL ONCE AS NEEDED
Status: DISCONTINUED | OUTPATIENT
Start: 2025-04-04 | End: 2025-04-05 | Stop reason: HOSPADM

## 2025-04-04 RX ORDER — EPINEPHRINE 1 MG/ML
0.3 INJECTION, SOLUTION INTRAMUSCULAR; SUBCUTANEOUS ONCE AS NEEDED
Start: 2025-04-18

## 2025-04-04 RX ORDER — METHYLPREDNISOLONE SODIUM SUCCINATE 125 MG/2ML
50 INJECTION, POWDER, LYOPHILIZED, FOR SOLUTION INTRAMUSCULAR; INTRAVENOUS ONCE AS NEEDED
Status: DISCONTINUED | OUTPATIENT
Start: 2025-04-04 | End: 2025-04-05 | Stop reason: HOSPADM

## 2025-04-04 RX ORDER — SODIUM CHLORIDE 0.9 % (FLUSH) 0.9 %
10 SYRINGE (ML) INJECTION AS NEEDED
Status: DISCONTINUED | OUTPATIENT
Start: 2025-04-04 | End: 2025-04-05 | Stop reason: HOSPADM

## 2025-04-04 RX ADMIN — SODIUM CHLORIDE 500 ML: 9 INJECTION, SOLUTION INTRAVENOUS at 09:39

## 2025-04-04 RX ADMIN — IMMUNE GLOBULIN INFUSION (HUMAN) 20 G: 100 INJECTION, SOLUTION INTRAVENOUS; SUBCUTANEOUS at 10:16

## 2025-04-04 RX ADMIN — Medication 10 ML: at 13:34

## 2025-04-04 RX ADMIN — HEPARIN 500 UNITS: 100 SYRINGE at 13:35

## 2025-04-04 RX ADMIN — IMMUNE GLOBULIN INFUSION (HUMAN) 20 G: 100 INJECTION, SOLUTION INTRAVENOUS; SUBCUTANEOUS at 12:02

## 2025-04-04 NOTE — CODE DOCUMENTATION
0936) Port accessed via sterile technique x1 attempt.  Flushes easily, positive blood flow.  Labs collected and to inpatient lab for processing.  Patient tolerated well.

## 2025-04-08 RX ORDER — ALLOPURINOL 100 MG/1
100 TABLET ORAL DAILY
Qty: 90 TABLET | Refills: 3 | Status: SHIPPED | OUTPATIENT
Start: 2025-04-08

## 2025-04-22 RX ORDER — ISOSORBIDE MONONITRATE 30 MG/1
30 TABLET, EXTENDED RELEASE ORAL EVERY MORNING
Qty: 90 TABLET | Refills: 0 | Status: SHIPPED | OUTPATIENT
Start: 2025-04-22

## 2025-04-23 ENCOUNTER — OFFICE VISIT (OUTPATIENT)
Dept: INTERNAL MEDICINE | Facility: CLINIC | Age: 70
End: 2025-04-23
Payer: MEDICARE

## 2025-04-23 ENCOUNTER — OFFICE VISIT (OUTPATIENT)
Dept: UROLOGY | Facility: CLINIC | Age: 70
End: 2025-04-23
Payer: MEDICARE

## 2025-04-23 VITALS
DIASTOLIC BLOOD PRESSURE: 86 MMHG | WEIGHT: 205 LBS | OXYGEN SATURATION: 97 % | SYSTOLIC BLOOD PRESSURE: 134 MMHG | TEMPERATURE: 98.1 F | HEIGHT: 65 IN | RESPIRATION RATE: 18 BRPM | BODY MASS INDEX: 34.16 KG/M2 | HEART RATE: 96 BPM

## 2025-04-23 VITALS
HEIGHT: 65 IN | TEMPERATURE: 97.7 F | WEIGHT: 206 LBS | SYSTOLIC BLOOD PRESSURE: 138 MMHG | BODY MASS INDEX: 34.32 KG/M2 | HEART RATE: 106 BPM | OXYGEN SATURATION: 96 % | DIASTOLIC BLOOD PRESSURE: 88 MMHG

## 2025-04-23 DIAGNOSIS — R25.2 MUSCLE CRAMP: ICD-10-CM

## 2025-04-23 DIAGNOSIS — N39.3 SUI (STRESS URINARY INCONTINENCE), MALE: ICD-10-CM

## 2025-04-23 DIAGNOSIS — J45.41 MODERATE PERSISTENT ASTHMA WITH EXACERBATION: ICD-10-CM

## 2025-04-23 DIAGNOSIS — J32.0 MAXILLARY SINUSITIS, UNSPECIFIED CHRONICITY: ICD-10-CM

## 2025-04-23 DIAGNOSIS — E55.9 VITAMIN D DEFICIENCY, UNSPECIFIED: ICD-10-CM

## 2025-04-23 DIAGNOSIS — N39.41 URGE INCONTINENCE OF URINE: Primary | ICD-10-CM

## 2025-04-23 DIAGNOSIS — Z79.4 TYPE 2 DIABETES MELLITUS WITHOUT COMPLICATION, WITH LONG-TERM CURRENT USE OF INSULIN: ICD-10-CM

## 2025-04-23 DIAGNOSIS — M25.552 LEFT HIP PAIN: Primary | ICD-10-CM

## 2025-04-23 DIAGNOSIS — Z00.00 ROUTINE GENERAL MEDICAL EXAMINATION AT A HEALTH CARE FACILITY: ICD-10-CM

## 2025-04-23 DIAGNOSIS — E11.9 TYPE 2 DIABETES MELLITUS WITHOUT COMPLICATION, WITH LONG-TERM CURRENT USE OF INSULIN: ICD-10-CM

## 2025-04-23 DIAGNOSIS — R05.2 SUBACUTE COUGH: ICD-10-CM

## 2025-04-23 RX ORDER — SOLIFENACIN SUCCINATE 5 MG/1
5 TABLET, FILM COATED ORAL DAILY
Qty: 30 TABLET | Refills: 3 | Status: SHIPPED | OUTPATIENT
Start: 2025-04-23

## 2025-04-23 RX ORDER — ACETAMINOPHEN AND CODEINE PHOSPHATE 300; 30 MG/1; MG/1
1 TABLET ORAL EVERY 4 HOURS PRN
Qty: 15 TABLET | Refills: 1 | Status: SHIPPED | OUTPATIENT
Start: 2025-04-23

## 2025-04-23 NOTE — PROGRESS NOTES
Subjective     Patient ID: Raquel Mariee is a 69 y.o. female. Patient is here for management of multiple medical problems.     Chief Complaint   Patient presents with    Follow-up     Sleep medication changes adding benadryl   New pain entire Left leg  Using liquid IV for finger and toe cramping, worried  about sodium levels     History of Present Illness     Using benadryl as needed.    Cramping in fingers and toes.  Low sodium.    Leg pain.  Hip inj for Dr Sharpe. Pain in groin and knee.  Going to [facility] pain management tomorrow.      DM    Last uric acid 2.8      The following portions of the patient's history were reviewed and updated as appropriate: allergies, current medications, past family history, past medical history, past social history, past surgical history and problem list.    Review of Systems    Current Outpatient Medications:     acetaminophen-codeine (TYLENOL with CODEINE #3) 300-30 MG per tablet, Take 1 tablet by mouth Every 4 (Four) Hours As Needed for Moderate Pain., Disp: 15 tablet, Rfl: 1    albuterol sulfate  (90 Base) MCG/ACT inhaler, Inhale 2 puffs by mouth every 4 to 6 hours as needed for cough, wheeze, shortness of breath. Use with vortex spacer, Disp: 8.5 g, Rfl: 3    allopurinol (ZYLOPRIM) 100 MG tablet, Take 1 tablet by mouth Daily., Disp: 90 tablet, Rfl: 3    betamethasone valerate (VALISONE) 0.1 % cream, Apply topically to the appropriate area as directed Daily. (Patient taking differently: Apply  topically to the appropriate area as directed Daily As Needed.), Disp: 45 g, Rfl: 11    Budeson-Glycopyrrol-Formoterol (BREZTRI) 160-9-4.8 MCG/ACT aerosol inhaler, Inhale 2 puffs by mouth 2 (Two) Times a Day. Rinse out mouth after use, Disp: 32.1 g, Rfl: 3    buPROPion SR (WELLBUTRIN SR) 150 MG 12 hr tablet, Take 1 tablet by mouth 2 (Two) Times a Day., Disp: 180 tablet, Rfl: 3    calcium carbonate (Antacid Maximum) 1000 MG chewable tablet, Chew 500 mg 3 (Three) Times a Day.,  Disp: , Rfl:     cholecalciferol (VITAMIN D3) 25 MCG (1000 UT) tablet, Take 1 tablet by mouth Daily., Disp: , Rfl:     denosumab (PROLIA) 60 MG/ML solution prefilled syringe syringe, Inject 1 mL under the skin into the appropriate area as directed Every 6 (Six) Months., Disp: , Rfl:     diphenhydrAMINE (Benadryl Allergy) 25 MG tablet, Take 1-2 tablets by mouth Every 4-6 Hours As Needed., Disp: 90 tablet, Rfl: 11    DULoxetine (Cymbalta) 60 MG capsule, Take 1 capsule by mouth Daily., Disp: 90 capsule, Rfl: 3    estradiol (ESTRACE) 0.1 MG/GM vaginal cream, Insert 0.5 grams vaginally twice weekly, Disp: 42.5 g, Rfl: 5    ferrous sulfate 325 (65 FE) MG EC tablet, Take 2 tablets by mouth Daily With Breakfast., Disp: , Rfl:     fexofenadine (ALLEGRA) 180 MG tablet, Take 1 tablet by mouth Daily., Disp: , Rfl:     Fluticasone Propionate (Xhance) 93 MCG/ACT Exhaler Suspension, Instill 1 spray in both nostrils twice a day, Disp: 16 mL, Rfl: 11    glucosamine-chondroitin 500-400 MG capsule capsule, Take 1 capsule by mouth 2 (Two) Times a Day With Meals., Disp: , Rfl:     guaiFENesin-codeine (ROMILAR-AC) 100-10 MG/5ML solution/syrup, Take 5 mL by mouth 3 (Three) Times a Day As Needed for Cough or Congestion., Disp: 90 mL, Rfl: 0    Hydrocortisone, Perianal, (Anusol-HC) 2.5 % rectal cream, Apply rectally 2 times daily, Disp: 30 g, Rfl: 2    Hypertonic Nasal Wash (Sinus Rinse Kit) pack, use once or twice daily as needed, Disp: 1 each, Rfl: 3    immune globulin, human, (Gammagard) 20 GM/200ML solution infusion, Infuse 40 g into a venous catheter Every 28 (Twenty-Eight) Days., Disp: 400 mL, Rfl: 0    immune globulin, human, (Gammagard) 30 GM/300ML solution infusion, 40 grams IV every 4 weeks, Disp: 40 mL, Rfl: 11    immune globulin, human, (Gammagard) 30 GM/300ML solution infusion, 50 grams IV every 4 weeks, Disp: 300 mL, Rfl: 6    ipratropium (ATROVENT) 0.06 % nasal spray, Instill 1-2 sprays in each nostril 2-3 times daily as  needed, Disp: 15 mL, Rfl: 3    ipratropium-albuterol (DUO-NEB) 0.5-2.5 mg/3 ml nebulizer, Inhale the contents of 1 vial through a nebulizer every 4-6 hours as needed for cough,wheezing and shortness of breath., Disp: 90 mL, Rfl: 3    isosorbide mononitrate (IMDUR) 30 MG 24 hr tablet, Take 1 tablet by mouth Every Morning., Disp: 90 tablet, Rfl: 0    levothyroxine (SYNTHROID, LEVOTHROID) 50 MCG tablet, Take 1 tablet by mouth Daily., Disp: 90 tablet, Rfl: 3    lidocaine (XYLOCAINE) 5 % ointment, Apply 1 Application topically to the appropriate area as directed Every 2 (Two) Hours As Needed for Mild Pain., Disp: 50 g, Rfl: 0    linaclotide (Linzess) 145 MCG capsule capsule, Take 1 capsule by mouth Every Morning Before Breakfast., Disp: 30 capsule, Rfl: 11    Magnesium 250 MG tablet, Take 2 tablets by mouth 2 (Two) Times a Day., Disp: , Rfl:     Mepolizumab (Nucala) 100 MG/ML solution auto-injector, Inject 1 mL under the skin into the appropriate area as directed Every 28 (Twenty-Eight) Days. Obtaining medication from [facility], Disp: , Rfl:     Misc. Devices (Sitz Bath) misc, Use 1 each As Needed (for hemorrhoids)., Disp: 1 each, Rfl: 0    montelukast (SINGULAIR) 10 MG tablet, Take 1 tablet by mouth every night at bedtime., Disp: 90 tablet, Rfl: 3    multivitamin with minerals tablet tablet, Take 1 tablet by mouth Daily., Disp: , Rfl:     omeprazole (priLOSEC) 40 MG capsule, Take 1 capsule by mouth 2 (Two) Times a Day., Disp: 180 capsule, Rfl: 3    ondansetron (ZOFRAN) 4 MG tablet, Take 1 tablet by mouth Every 8 (Eight) Hours As Needed for Nausea or Vomiting., Disp: 30 tablet, Rfl: 11    potassium citrate (UROCIT-K) 10 MEQ (1080 MG) CR tablet, Take 1 tablet by mouth Daily., Disp: 90 tablet, Rfl: 3    predniSONE (DELTASONE) 20 MG tablet, Take 2 tablets prior to Gammgard infusion, Disp: 6 tablet, Rfl: 3    predniSONE (DELTASONE) 20 MG tablet, Take 2 tablets by mouth Daily., Disp: 10 tablet, Rfl: 0     "solifenacin (VESIcare) 5 MG tablet, Take 1 tablet by mouth Daily., Disp: 30 tablet, Rfl: 3    tiZANidine (ZANAFLEX) 4 MG tablet, Take 1 tablet by mouth 2 (Two) Times a Day As Needed for Muscle Spasms., Disp: 100 tablet, Rfl: 3    ubrogepant (Ubrelvy) 100 MG tablet, Take 1 tablet by mouth 1 (One) Time As Needed (migraine)., Disp: 4 tablet, Rfl: 0    valsartan (DIOVAN) 160 MG tablet, Take 1 tablet by mouth Daily., Disp: 90 tablet, Rfl: 3    vitamin B-12 (CYANOCOBALAMIN) 1000 MCG tablet, Take 1 tablet by mouth Daily., Disp: , Rfl:     Objective      Blood pressure 138/88, pulse 106, temperature 97.7 °F (36.5 °C), height 165.1 cm (65\"), weight 93.4 kg (206 lb), SpO2 96%, not currently breastfeeding.            Physical Exam     General Appearance:    Alert, cooperative, no distress, appears stated age   Head:    Normocephalic, without obvious abnormality, atraumatic   Eyes:    PERRL, conjunctiva/corneas clear, EOM's intact   Ears:    Normal TM's and external ear canals, both ears   Nose:   Nares normal, septum midline, mucosa normal, no drainage   or sinus tenderness   Throat:   Lips, mucosa, and tongue normal; teeth and gums normal   Neck:   Supple, symmetrical, trachea midline, no adenopathy;        thyroid:  No enlargement/tenderness/nodules; no carotid    bruit or JVD   Back:     Symmetric, no curvature, ROM normal, no CVA tenderness   Lungs:     Clear to auscultation bilaterally, respirations unlabored   Chest wall:    No tenderness or deformity   Heart:    Regular rate and rhythm, S1 and S2 normal, no murmur,        rub or gallop   Abdomen:     Soft, non-tender, bowel sounds active all four quadrants,     no masses, no organomegaly   Extremities:   Extremities normal, atraumatic, no cyanosis or edema   Pulses:   2+ and symmetric all extremities   Skin:   Skin color, texture, turgor normal, no rashes or lesions   Lymph nodes:   Cervical, supraclavicular, and axillary nodes normal   Neurologic:   CNII-XII intact. " Normal strength, sensation and reflexes       throughout      Results for orders placed or performed during the hospital encounter of 04/04/25   Comprehensive Metabolic Panel    Collection Time: 04/04/25  9:37 AM    Specimen: Blood   Result Value Ref Range    Glucose 136 (H) 65 - 99 mg/dL    BUN 17 8 - 23 mg/dL    Creatinine 1.28 (H) 0.57 - 1.00 mg/dL    Sodium 136 136 - 145 mmol/L    Potassium 4.1 3.5 - 5.2 mmol/L    Chloride 100 98 - 107 mmol/L    CO2 24.4 22.0 - 29.0 mmol/L    Calcium 9.5 8.6 - 10.5 mg/dL    Total Protein 7.1 6.0 - 8.5 g/dL    Albumin 4.3 3.5 - 5.2 g/dL    ALT (SGPT) 20 1 - 33 U/L    AST (SGOT) 28 1 - 32 U/L    Alkaline Phosphatase 61 39 - 117 U/L    Total Bilirubin 0.6 0.0 - 1.2 mg/dL    Globulin 2.8 gm/dL    A/G Ratio 1.5 g/dL    BUN/Creatinine Ratio 13.3 7.0 - 25.0    Anion Gap 11.6 5.0 - 15.0 mmol/L    eGFR 45.4 (L) >60.0 mL/min/1.73   IgG    Collection Time: 04/04/25  9:37 AM    Specimen: Blood   Result Value Ref Range    IgG 1,025 700 - 1,600 mg/dL   CBC Auto Differential    Collection Time: 04/04/25  9:37 AM    Specimen: Blood   Result Value Ref Range    WBC 9.10 3.40 - 10.80 10*3/mm3    RBC 4.39 3.77 - 5.28 10*6/mm3    Hemoglobin 14.0 12.0 - 15.9 g/dL    Hematocrit 40.8 34.0 - 46.6 %    MCV 92.9 79.0 - 97.0 fL    MCH 31.9 26.6 - 33.0 pg    MCHC 34.3 31.5 - 35.7 g/dL    RDW 13.5 12.3 - 15.4 %    RDW-SD 45.8 37.0 - 54.0 fl    MPV 9.2 6.0 - 12.0 fL    Platelets 286 140 - 450 10*3/mm3    Neutrophil % 76.0 42.7 - 76.0 %    Lymphocyte % 15.2 (L) 19.6 - 45.3 %    Monocyte % 6.3 5.0 - 12.0 %    Eosinophil % 0.5 0.3 - 6.2 %    Basophil % 1.0 0.0 - 1.5 %    Immature Grans % 1.0 (H) 0.0 - 0.5 %    Neutrophils, Absolute 6.92 1.70 - 7.00 10*3/mm3    Lymphocytes, Absolute 1.38 0.70 - 3.10 10*3/mm3    Monocytes, Absolute 0.57 0.10 - 0.90 10*3/mm3    Eosinophils, Absolute 0.05 0.00 - 0.40 10*3/mm3    Basophils, Absolute 0.09 0.00 - 0.20 10*3/mm3    Immature Grans, Absolute 0.09 (H) 0.00 - 0.05 10*3/mm3     nRBC 0.0 0.0 - 0.2 /100 WBC     *Note: Due to a large number of results and/or encounters for the requested time period, some results have not been displayed. A complete set of results can be found in Results Review.         Assessment & Plan   Narrative & Impression   PROCEDURE: XR HIPS BILATERAL W OR WO PELVIS 2 VIEW-     HISTORY: pain; M25.551-Pain in right hip; M25.552-Pain in left hip     COMPARISON: None.     FINDINGS:  A 2 view exam of each hip demonstrates no acute fracture or  dislocation. There are degenerative changes of the SI joints. There is  mild narrowing of the left hip joint. Multiple surgical clips are seen  in the pelvis projecting over the superior rami bilaterally. A bladder  stimulator is present. There is osteopenia.     IMPRESSION:  No acute fracture.     Chronic changes as above.     Would like to see ortho.       Start CAlm    Diagnoses and all orders for this visit:    1. Left hip pain (Primary)  -     Ambulatory Referral to Orthopedic Surgery  -     Lipid Panel  -     CBC & Differential  -     Comprehensive Metabolic Panel  -     Vitamin D,25-Hydroxy  -     Hemoglobin A1c  -     Microalbumin / Creatinine Urine Ratio - Urine, Clean Catch    2. Maxillary sinusitis, unspecified chronicity  -     ubrogepant (Ubrelvy) 100 MG tablet; Take 1 tablet by mouth 1 (One) Time As Needed (migraine).  Dispense: 4 tablet; Refill: 0  -     Lipid Panel  -     CBC & Differential  -     Comprehensive Metabolic Panel  -     Vitamin D,25-Hydroxy  -     Hemoglobin A1c  -     Microalbumin / Creatinine Urine Ratio - Urine, Clean Catch    3. Type 2 diabetes mellitus without complication, with long-term current use of insulin  -     ubrogepant (Ubrelvy) 100 MG tablet; Take 1 tablet by mouth 1 (One) Time As Needed (migraine).  Dispense: 4 tablet; Refill: 0  -     Lipid Panel  -     CBC & Differential  -     Comprehensive Metabolic Panel  -     Vitamin D,25-Hydroxy  -     Hemoglobin A1c  -     Microalbumin /  Creatinine Urine Ratio - Urine, Clean Catch    4. Routine general medical examination at a health care facility  -     ubrogepant (Ubrelvy) 100 MG tablet; Take 1 tablet by mouth 1 (One) Time As Needed (migraine).  Dispense: 4 tablet; Refill: 0  -     Lipid Panel  -     CBC & Differential  -     Comprehensive Metabolic Panel  -     Vitamin D,25-Hydroxy  -     Hemoglobin A1c  -     Microalbumin / Creatinine Urine Ratio - Urine, Clean Catch    5. Muscle cramp  -     Lipid Panel  -     CBC & Differential  -     Comprehensive Metabolic Panel  -     Vitamin D,25-Hydroxy  -     Hemoglobin A1c  -     Microalbumin / Creatinine Urine Ratio - Urine, Clean Catch    6. Vitamin D deficiency, unspecified  -     Vitamin D,25-Hydroxy      Return in about 6 months (around 10/23/2025).          There are no Patient Instructions on file for this visit.     Sanjeev Rawls MD    Assessment & Plan

## 2025-04-23 NOTE — PROGRESS NOTES
InterStim follow-up     Patient Name: Raquel Mariee  : 1955   MRN: [id number]     Chief Complaint:   Chief Complaint   Patient presents with    urge incontinence     History of Present Illness  The patient presents for evaluation of urinary incontinence.  - She is considering medication addition and seeks advice on potential benefits.       Device: Interstim X implanted 5/3/2023  Current Program:  4(-3,+0)   Current Amplitude:  1.2 mA    Patient complaint:  Continues to have urge incontinence and JENNIE  Pads/Pull ups per day 0  Leaks 5-6  Urgency mild to moderate  Frequency every 2 hours  Nocturia 2-3    InterStim turned on yes  Is stimulation uncomfortable no  Any changes to diet No  Any constipation no  Any changes in medication no  Any changes in other medical conditions no  Recent fall or recent procedure/surgery no    Daily water intake approximately 1 gallon daily  Pelvic floor PT no    Other Bladder Medications and Treatments: None      Subjective      Review of System:   As noted in HPI     Past Medical History:   Past Medical History:   Diagnosis Date    Anxiety     Aortic valve stenosis     Cardiac murmur     Cataract, bilateral     CHF (congestive heart failure)     Cholelithiasis GB out    Chronic kidney disease     Stage III    Clostridium difficile infection     in her 30s    Colon polyp Ever since having colonoscopies.    Common variable immunodeficiency     IVIG infusions    COPD (chronic obstructive pulmonary disease)     Coronary artery disease     Prinz metal angina & aortic stenosis    Depression     Diabetes mellitus     type II    Eosinophilic asthma     Fibromyalgia, primary     Full dentures     GERD (gastroesophageal reflux disease)     History of transfusion     in  at [facility] in [place].  No reaction noted, patient states she does have an antibody from transfusion.    Hypertension     Hypogammaglobulinemia     Hypothyroidism     Irritable bowel syndrome      Migraines     Morbid obesity     Optic neuritis     Optic neuropathy     Osteoarthritis     Prinzmetal angina 1990    Pseudomonas infection 1998    lungs    PTSD (post-traumatic stress disorder)     Pulmonary edema     Renal insufficiency     Sinus tachycardia 1990    Sleep apnea     no longer uses/needs cpap    Stroke     TIA about 7 years ago    Tachycardia     TIA (transient ischemic attack) 2017    Trochanteric bursitis 01/25/2023       Past Surgical History:   Past Surgical History:   Procedure Laterality Date    APPENDECTOMY      BUNIONECTOMY Bilateral     CARDIAC CATHETERIZATION  2017    no stents needed    CARPAL TUNNEL RELEASE Right     COLONOSCOPY  2021    ENDOMETRIAL ABLATION  1997    EYE SURGERY  ? About 6-8 years ago    Laser for glaucoma    FRACTURE SURGERY  1995    No hardware; Tibeal plateau    GASTRIC BYPASS      HAND SURGERY Left     No hardware    HEMORRHOIDECTOMY N/A 04/09/2024    Procedure: HEMORRHOID BANDING X2;  Surgeon: Melissa Beck MD;  Location: [facility] OR;  Service: General;  Laterality: N/A;    INTERSTIM PLACEMENT N/A 05/03/2023    Procedure: INTERSTIM STAGES 1 AND 2 LEAD AND GENERATOR PLACEMENT;  Surgeon: Abner Nation MD;  Location: [facility] OR;  Service: Urology;  Laterality: N/A;    POSTERIOR VAGINAL REPAIR N/A 08/02/2023    Procedure: POSTERIOR COLPORRHAPHY;  Surgeon: Kellen Galan MD;  Location:  [facility] OR;  Service: Gynecology;  Laterality: N/A;    RECTAL EXAMINATION UNDER ANESTHESIA N/A 04/09/2024    Procedure: RECTAL EXAM UNDER ANESTHESIA;  Surgeon: Melissa Beck MD;  Location: [facility] OR;  Service: General;  Laterality: N/A;    REPLACEMENT TOTAL KNEE BILATERAL      TEETH EXTRACTION      all of bottom teeth removed    THORACOTOMY      TONSILLECTOMY      TOTAL ABDOMINAL HYSTERECTOMY WITH SALPINGO OOPHORECTOMY      TRACHEAL SURGERY      Repaired via thoracotomy    TUBAL ABDOMINAL LIGATION  1983    VENOUS ACCESS DEVICE (PORT) INSERTION      port a cath in the  left chest wall       Family History:   Family History   Problem Relation Age of Onset    Diabetes Mother     Stroke Mother     Heart disease Mother     Hypertension Mother     Endometriosis Mother     Depression Mother     Diabetes Father     Hypertension Father     Stroke Father     Heart attack Father     Asthma Father     COPD Father     Dementia Father     Heart disease Father     COPD Sister     Asthma Sister     Cancer Sister         Nasal cell skin    Brain cancer Sister     Diabetes Sister     Stroke Sister     Heart attack Sister     Endometriosis Sister     Depression Sister     Arthritis Sister         Rheumatoid    Hypertension Sister     Kidney disease Sister     Diabetes Sister     COPD Sister     Asthma Sister     Endometriosis Sister     Depression Sister     Cancer Sister         Glioma    Heart disease Sister         MI    Heart attack Sister     Endometriosis Sister     Asthma Sister     Hypertension Sister     Kidney disease Sister         ESRD    COPD Brother     Asthma Brother         Turned into COPD    Diabetes Brother     Asthma Brother     COPD Brother     Diabetes Brother     Hypertension Brother     Asthma Daughter     Endometriosis Daughter     Osteoarthritis Daughter     Hypertension Daughter     Kidney failure Daughter     Irritable bowel syndrome Daughter     Anxiety disorder Daughter     Bipolar disorder Daughter     Depression Daughter     Self-Injurious Behavior  Daughter     Suicide Attempts Daughter     Mental illness Daughter         Bipolar and eating fisorder    Asthma Daughter     Endometriosis Daughter     Osteoarthritis Daughter     Asthma Son     ADD / ADHD Son     Alcohol abuse Son     Drug abuse Son     Breast cancer Maternal Cousin     Heart disease Maternal Grandfather     Heart disease Maternal Uncle     OCD Neg Hx     Paranoid behavior Neg Hx     Schizophrenia Neg Hx     Seizures Neg Hx     Ovarian cancer Neg Hx        Social History:   Social History      Socioeconomic History    Marital status:    Tobacco Use    Smoking status: Never     Passive exposure: Never    Smokeless tobacco: Never   Vaping Use    Vaping status: Never Used   Substance and Sexual Activity    Alcohol use: Yes     Comment: ONCE A YEAR    Drug use: Never    Sexual activity: Defer       Medications:     Current Outpatient Medications:     acetaminophen-codeine (TYLENOL with CODEINE #3) 300-30 MG per tablet, Take 1 tablet by mouth Every 4 (Four) Hours As Needed for Moderate Pain., Disp: 15 tablet, Rfl: 1    albuterol sulfate  (90 Base) MCG/ACT inhaler, Inhale 2 puffs by mouth every 4 to 6 hours as needed for cough, wheeze, shortness of breath. Use with vortex spacer, Disp: 8.5 g, Rfl: 3    allopurinol (ZYLOPRIM) 100 MG tablet, Take 1 tablet by mouth Daily., Disp: 90 tablet, Rfl: 3    betamethasone valerate (VALISONE) 0.1 % cream, Apply topically to the appropriate area as directed Daily. (Patient taking differently: Apply  topically to the appropriate area as directed Daily As Needed.), Disp: 45 g, Rfl: 11    Budeson-Glycopyrrol-Formoterol (BREZTRI) 160-9-4.8 MCG/ACT aerosol inhaler, Inhale 2 puffs by mouth 2 (Two) Times a Day. Rinse out mouth after use, Disp: 32.1 g, Rfl: 3    buPROPion SR (WELLBUTRIN SR) 150 MG 12 hr tablet, Take 1 tablet by mouth 2 (Two) Times a Day., Disp: 180 tablet, Rfl: 3    calcium carbonate (Antacid Maximum) 1000 MG chewable tablet, Chew 500 mg 3 (Three) Times a Day., Disp: , Rfl:     cholecalciferol (VITAMIN D3) 25 MCG (1000 UT) tablet, Take 1 tablet by mouth Daily., Disp: , Rfl:     denosumab (PROLIA) 60 MG/ML solution prefilled syringe syringe, Inject 1 mL under the skin into the appropriate area as directed Every 6 (Six) Months., Disp: , Rfl:     diphenhydrAMINE (Benadryl Allergy) 25 MG tablet, Take 1-2 tablets by mouth Every 4-6 Hours As Needed., Disp: 90 tablet, Rfl: 11    DULoxetine (Cymbalta) 60 MG capsule, Take 1 capsule by mouth Daily., Disp: 90  capsule, Rfl: 3    estradiol (ESTRACE) 0.1 MG/GM vaginal cream, Insert 0.5 grams vaginally twice weekly, Disp: 42.5 g, Rfl: 5    ferrous sulfate 325 (65 FE) MG EC tablet, Take 2 tablets by mouth Daily With Breakfast., Disp: , Rfl:     fexofenadine (ALLEGRA) 180 MG tablet, Take 1 tablet by mouth Daily., Disp: , Rfl:     Fluticasone Propionate (Xhance) 93 MCG/ACT Exhaler Suspension, Instill 1 spray in both nostrils twice a day, Disp: 16 mL, Rfl: 11    glucosamine-chondroitin 500-400 MG capsule capsule, Take 1 capsule by mouth 2 (Two) Times a Day With Meals., Disp: , Rfl:     guaiFENesin-codeine (ROMILAR-AC) 100-10 MG/5ML solution/syrup, Take 5 mL by mouth 3 (Three) Times a Day As Needed for Cough or Congestion., Disp: 90 mL, Rfl: 0    Hydrocortisone, Perianal, (Anusol-HC) 2.5 % rectal cream, Apply rectally 2 times daily, Disp: 30 g, Rfl: 2    Hypertonic Nasal Wash (Sinus Rinse Kit) pack, use once or twice daily as needed, Disp: 1 each, Rfl: 3    immune globulin, human, (Gammagard) 20 GM/200ML solution infusion, Infuse 40 g into a venous catheter Every 28 (Twenty-Eight) Days., Disp: 400 mL, Rfl: 0    immune globulin, human, (Gammagard) 30 GM/300ML solution infusion, 40 grams IV every 4 weeks, Disp: 40 mL, Rfl: 11    immune globulin, human, (Gammagard) 30 GM/300ML solution infusion, 50 grams IV every 4 weeks, Disp: 300 mL, Rfl: 6    ipratropium (ATROVENT) 0.06 % nasal spray, Instill 1-2 sprays in each nostril 2-3 times daily as needed, Disp: 15 mL, Rfl: 3    ipratropium-albuterol (DUO-NEB) 0.5-2.5 mg/3 ml nebulizer, Inhale the contents of 1 vial through a nebulizer every 4-6 hours as needed for cough,wheezing and shortness of breath., Disp: 90 mL, Rfl: 3    isosorbide mononitrate (IMDUR) 30 MG 24 hr tablet, Take 1 tablet by mouth Every Morning., Disp: 90 tablet, Rfl: 0    levothyroxine (SYNTHROID, LEVOTHROID) 50 MCG tablet, Take 1 tablet by mouth Daily., Disp: 90 tablet, Rfl: 3    lidocaine (XYLOCAINE) 5 % ointment,  Apply 1 Application topically to the appropriate area as directed Every 2 (Two) Hours As Needed for Mild Pain., Disp: 50 g, Rfl: 0    linaclotide (Linzess) 145 MCG capsule capsule, Take 1 capsule by mouth Every Morning Before Breakfast., Disp: 30 capsule, Rfl: 11    Magnesium 250 MG tablet, Take 2 tablets by mouth 2 (Two) Times a Day., Disp: , Rfl:     Mepolizumab (Nucala) 100 MG/ML solution auto-injector, Inject 1 mL under the skin into the appropriate area as directed Every 28 (Twenty-Eight) Days. Obtaining medication from [place] to Nucala PAP, Disp: , Rfl:     Misc. Devices (Sitz Bath) misc, Use 1 each As Needed (for hemorrhoids)., Disp: 1 each, Rfl: 0    montelukast (SINGULAIR) 10 MG tablet, Take 1 tablet by mouth every night at bedtime., Disp: 90 tablet, Rfl: 3    multivitamin with minerals tablet tablet, Take 1 tablet by mouth Daily., Disp: , Rfl:     omeprazole (priLOSEC) 40 MG capsule, Take 1 capsule by mouth 2 (Two) Times a Day., Disp: 180 capsule, Rfl: 3    ondansetron (ZOFRAN) 4 MG tablet, Take 1 tablet by mouth Every 8 (Eight) Hours As Needed for Nausea or Vomiting., Disp: 30 tablet, Rfl: 11    potassium citrate (UROCIT-K) 10 MEQ (1080 MG) CR tablet, Take 1 tablet by mouth Daily., Disp: 90 tablet, Rfl: 3    predniSONE (DELTASONE) 20 MG tablet, Take 2 tablets prior to Gammgard infusion, Disp: 6 tablet, Rfl: 3    predniSONE (DELTASONE) 20 MG tablet, Take 2 tablets by mouth Daily., Disp: 10 tablet, Rfl: 0    solifenacin (VESIcare) 5 MG tablet, Take 1 tablet by mouth Daily., Disp: 30 tablet, Rfl: 3    tiZANidine (ZANAFLEX) 4 MG tablet, Take 1 tablet by mouth 2 (Two) Times a Day As Needed for Muscle Spasms., Disp: 100 tablet, Rfl: 3    ubrogepant (Ubrelvy) 100 MG tablet, Take 1 tablet by mouth 1 (One) Time As Needed (migraine)., Disp: 4 tablet, Rfl: 0    valsartan (DIOVAN) 160 MG tablet, Take 1 tablet by mouth Daily., Disp: 90 tablet, Rfl: 3    vitamin B-12 (CYANOCOBALAMIN) 1000 MCG tablet, Take 1 tablet by  "mouth Daily., Disp: , Rfl:     Allergies:   Allergies   Allergen Reactions    Cefaclor Hives and Swelling     Other reaction(s): SWELLING  Shed 4 layers of skin and extremely SOB  Cesario Bishop's syndrome        Cephalexin Other (See Comments)     Cesario-Bishop's syndrome      Cephalosporins Hives, Swelling and Angioedema     Rechallenge with cefipime caused rash on 1/14/08.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    Verapamil Nausea And Vomiting     Dizzy    Zolpidem Other (See Comments) and Unknown (See Comments)     Automatic behaviour in sleep.  confusion    Amoxicillin Rash    Edecrin [Ethacrynic Acid] Rash and Other (See Comments)     Weight gain    Hydrochlorothiazide Hives    Sulfa Antibiotics Hives       Objective     Visit Vitals  /86   Pulse 96   Temp 98.1 °F (36.7 °C)   Resp 18   Ht 165.1 cm (65\")   Wt 93 kg (205 lb)   SpO2 97%   BMI 34.11 kg/m²        Body mass index is 34.11 kg/m².     Physical Exam  Vitals and nursing note reviewed.   Constitutional:       General: She is not in acute distress.     Appearance: Normal appearance. She is obese. She is not ill-appearing.   Pulmonary:      Effort: Pulmonary effort is normal. No respiratory distress.   Skin:     General: Skin is warm and dry.   Neurological:      General: No focal deficit present.      Mental Status: She is alert and oriented to person, place, and time.      Gait: Gait abnormal (Uses a cane).   Psychiatric:         Mood and " Affect: Mood normal.         Behavior: Behavior normal.          Labs  Lab Results   Component Value Date    COLORU Yellow 09/19/2023    CLARITYU Slightly Cloudy (A) 09/19/2023    SPECGRAV 1.005 09/19/2023    PHUR 7.0 09/19/2023    LEUKOCYTESUR Moderate (2+) (A) 09/19/2023    NITRITE Negative (A) 09/19/2023    PROTEINPOCUA Negative 09/19/2023    GLUCOSEUR Negative 09/19/2023    KETONESU Negative 09/19/2023    UROBILINOGEN Normal 09/19/2023    BILIRUBINUR Negative 09/19/2023    RBCUR 2+ (A) 09/19/2023      Lab Results   Component Value Date    RBCUA 0-2 03/22/2022    RBCUA 1 to 2 06/04/2020    BACTERIA Comment 03/22/2022    BACTERIA None Seen 06/04/2020    HYALCASTU None Seen 06/04/2020    HYALCASTU NONE SEEN 05/28/2019    SQUAMEPIUA 1 to 5 06/04/2020    SQUAMEPIUA NONE SEEN 05/28/2019        Lab Results   Component Value Date    WBC 9.10 04/04/2025    HGB 14.0 04/04/2025    HCT 40.8 04/04/2025    MCV 92.9 04/04/2025     04/04/2025     Lab Results   Component Value Date    GLUCOSE 136 (H) 04/04/2025    CALCIUM 9.5 04/04/2025     04/04/2025    K 4.1 04/04/2025    CO2 24.4 04/04/2025     04/04/2025    BUN 17 04/04/2025    BUN 9 03/04/2025    CREATININE 1.28 (H) 04/04/2025    CREATININE 1.11 (H) 03/04/2025    EGFR 45.4 (L) 04/04/2025    EGFR 53.9 (L) 03/04/2025    BCR 13.3 04/04/2025    ANIONGAP 11.6 04/04/2025    ALT 20 04/04/2025    AST 28 04/04/2025       Lab Results   Component Value Date    HGBA1C 6.30 (H) 03/04/2025        Results         Radiographic Studies  MRI Brain Without Contrast  Result Date: 3/12/2025  No significant change in mild amount of scattered FLAIR hyperintensities in the cerebral white matter. These are nonspecific in nature and may represent chronic small vessel ischemic disease may however possibility of a demyelinating process cannot be excluded in the appropriate clinical setting. CRITICAL RESULT: No. COMMUNICATION: Per this written report. By electronically signing this  report, I, the attending physician, attest that I have personally reviewed the images/data for the above examination(s) and agree with the final edited report. Drafted by Tino Mohan DO on 3/12/2025 10:17 AM Final report signed by Navjot Arce MD on 3/12/2025 5:40 PM    MRI Cyberknife Thoracic Spine Without Contrast  Result Date: 3/12/2025  Cervical Spine: Limited by motion degradation. 1. Multilevel degenerative changes of the cervical spine with probable severe spinal canal narrowing at C6-C7 and probable moderate to severe neural foraminal narrowings at bilateral C5-C6 and right C4-C5 levels. Please see above for level by level details. 2. Limited evaluation of the spinal cord due to motion. Apparent signal abnormality in the axial images at the lower cervical spinal cord to be artifactual although possibility of superimposed cord lesion cannot be excluded. If necessary the study may be repeated for further assessment. Thoracic Spine: 1. Degenerative changes of the thoracic spine as described above without significant spinal canal narrowing. 2. No definite spinal cord lesions. CRITICAL RESULT:   No. COMMUNICATION: Per this written report. By electronically signing this report, I, the attending physician, attest that I have personally reviewed the images/data for the above examination(s) and agree with the final edited report. Drafted by Tino Mohan DO on 3/12/2025 10:41 AM Final report signed by Navjot Arce MD on 3/12/2025 5:37 PM    MRI Cyberknife Cervical Spine Without Contrast  Result Date: 3/12/2025  Cervical Spine: Limited by motion degradation. 1. Multilevel degenerative changes of the cervical spine with probable severe spinal canal narrowing at C6-C7 and probable moderate to severe neural foraminal narrowings at bilateral C5-C6 and right C4-C5 levels. Please see above for level by level details. 2. Limited evaluation of the spinal cord due to motion. Apparent signal abnormality in the  axial images at the lower cervical spinal cord to be artifactual although possibility of superimposed cord lesion cannot be excluded. If necessary the study may be repeated for further assessment. Thoracic Spine: 1. Degenerative changes of the thoracic spine as described above without significant spinal canal narrowing. 2. No definite spinal cord lesions. CRITICAL RESULT:   No. COMMUNICATION: Per this written report. By electronically signing this report, I, the attending physician, attest that I have personally reviewed the images/data for the above examination(s) and agree with the final edited report. Drafted by Tino Mohan DO on 3/12/2025 10:41 AM Final report signed by Navjot Arce MD on 3/12/2025 5:37 PM    XR Hips Bilateral With or Without Pelvis 2 View  Result Date: 3/10/2025  No acute fracture.  Chronic changes as above.         Images were reviewed, interpreted, and dictated by Dr. Lisa Castellon MD Transcribed by Leyda Rachel PA-C.  This report was signed and finalized on 3/10/2025 12:10 PM by Lisa Castellon MD.      MRI Angiogram Venogram Head  Result Date: 3/3/2025  No evidence of dural venous sinus or cortical vein thrombosis. CRITICAL RESULT: No. COMMUNICATION: Per this written report. Drafted by Randolph Marie MD on 3/3/2025 11:49 AM Final report signed by Randolph Marie MD on 3/3/2025 11:55 AM    MR Orbits w and wo IV Contrast  Result Date: 3/3/2025  1. Probable enhancement in the left optic nerve sheath which may represent optic perineuritis. Otherwise unremarkable orbits. 2. Previously described signal abnormality in the optic chiasm and prechiasmatic optic nerves are not appreciated on this study. CRITICAL RESULT: No. COMMUNICATION: Per this written report. Drafted by Randolph Marie MD on 3/3/2025 11:33 AM Final report signed by Randolph Marie MD on 3/3/2025 11:55 AM    XR Chest 1 View  Result Date: 2/12/2025  Cardiomegaly. Vascular engorgement without overt edema. Right lung  basal ill-defined opacity, likely atelectasis.  Images personally reviewed, interpreted and dictated by JOYCE Fernando.     This report was signed and finalized on 2/12/2025 7:49 AM by JOYCE Fernando.        I have reviewed the above labs and imaging.     Assessment / Plan    Assessment:   Diagnoses and all orders for this visit:    1. Urge incontinence of urine (Primary)  -     solifenacin (VESIcare) 5 MG tablet; Take 1 tablet by mouth Daily.  Dispense: 30 tablet; Refill: 3    2. JENNIE (stress urinary incontinence), male       Assessment & Plan  1. Urinary incontinence  - Reports frequent urination every 2 hours during the day and occasional leaking with coughing or lifting heavy objects  - Adjust InterStim device settings to improve symptoms  - If unsatisfactory, consider combination therapy with bladder Botox and oral medications  - Discussed anticholinergic medications and potential side effects: dry mouth, dry eyes, constipation, dementia-like symptoms in elderly  - Initiate anticholinergic medication trial; consider alternatives if side effects occur     2. JENNIE  - does not seek treatment at this time    Impedance checked and no impedence noted.  Battery checked and estimated life remaining okay    Amplitude changed this  Program changed no    Program full@1.7 mA     Return in about 3 months (around 7/23/2025) for Joel.    Patient or patient representative verbalized consent for the use of Ambient Listening during the visit with  LILIBETH Pulido for chart documentation. 4/23/2025  12:58 EDT     Joel Griffin, MSN, APRN, FNP-C  [facility]

## 2025-04-25 ENCOUNTER — TREATMENT (OUTPATIENT)
Dept: PHYSICAL THERAPY | Facility: CLINIC | Age: 70
End: 2025-04-25
Payer: MEDICARE

## 2025-04-25 ENCOUNTER — PATIENT MESSAGE (OUTPATIENT)
Dept: INTERNAL MEDICINE | Facility: CLINIC | Age: 70
End: 2025-04-25
Payer: MEDICARE

## 2025-04-28 ENCOUNTER — TELEPHONE (OUTPATIENT)
Dept: PHYSICAL THERAPY | Facility: CLINIC | Age: 70
End: 2025-04-28
Payer: MEDICARE

## 2025-04-28 NOTE — TELEPHONE ENCOUNTER
Caller: Raquel Mariee    Relationship: Self    Best call back number:   Telephone Information:   Mobile [phone]      What was the call regarding: IF SOMETHING OPENS UP ON TUESDAY OR WEDNESDAY PATIENT WOULD LIKE TO COME IN EARLIER, ALSO PATIENT'S APPT WAS SCHEDULED AS ANOTHER EVAL SHOULD BE A TREATMENT

## 2025-05-01 ENCOUNTER — TELEPHONE (OUTPATIENT)
Dept: INTERNAL MEDICINE | Facility: CLINIC | Age: 70
End: 2025-05-01
Payer: MEDICARE

## 2025-05-01 ENCOUNTER — TREATMENT (OUTPATIENT)
Dept: PHYSICAL THERAPY | Facility: CLINIC | Age: 70
End: 2025-05-01
Payer: MEDICARE

## 2025-05-01 DIAGNOSIS — M25.552 PAIN OF LEFT HIP: Primary | ICD-10-CM

## 2025-05-01 PROCEDURE — 97161 PT EVAL LOW COMPLEX 20 MIN: CPT | Performed by: PHYSICAL THERAPIST

## 2025-05-01 RX ORDER — DOXYCYCLINE 100 MG/1
100 CAPSULE ORAL 2 TIMES DAILY
Qty: 20 CAPSULE | Refills: 0 | Status: SHIPPED | OUTPATIENT
Start: 2025-05-01

## 2025-05-01 NOTE — PROGRESS NOTES
Physical Therapy Initial Evaluation and Plan of Care   [address] [place] [address]      Patient: Raquel Mariee   : 1955  Diagnosis/ICD-10 Code:  Pain of left hip [M25.552]  Referring practitioner: Temo Husain DO    Subjective Evaluation    History of Present Illness  Date of onset: 4/3/2025  Mechanism of injury: Pt reports that her L hip is very painful and is helped minimally by tylenol, ice, and topical pain ointments. Pt reports insidious onset about 4 weeks ago. Pt reports that she got a Cortizone injection in the L hip back in March without any relief. Pt has had both knee replaced. Pt reports that she has an xray showing arthritis in both hips. Pt reports that she has had pain both hips but the L is the worst. Pt reports that moving, standing up, and walking makes the pain worse. Pt reports that she has had sciatic nerve pain in the past.       Patient Occupation: Retired Pain  Current pain rating: 3  At worst pain ratin  Quality: sharp (intense)  Relieving factors: medications and ice  Aggravating factors: ambulation, stairs, movement, squatting, sleeping and standing  Progression: no change    Social Support  Lives in: one-story house  Lives with: spouse    Hand dominance: right    Diagnostic Tests  X-ray: abnormal    Treatments  Previous treatment: medication and injection treatment  Current treatment: medication  Patient Goals  Patient goals for therapy: decreased pain, increased motion and increased strength           Objective          Palpation   Left   Tenderness of the adductor brevis, adductor longus, adductor reece, piriformis and TFL.     Additional Palpation Details  ITB tenderness in the L    Tenderness     Left Hip   Tenderness in the PSIS and greater trochanter.     Right Hip   Tenderness in the greater trochanter. No tenderness in the PSIS.     Neurological Testing     Reflexes   Left   Patellar (L4): trace (1+)  Achilles (S1): absent  (0)    Right   Patellar (L4): trace (1+)  Achilles (S1): absent (0)    Strength/Myotome Testing     Left Hip   Planes of Motion   Abduction: 3    Tests       Thoracic   Negative slump.     Lumbar     Left   Negative passive SLR.     Right   Negative passive SLR.     Ambulation     Observational Gait   Gait: antalgic   Decreased walking speed and stride length.     Quality of Movement During Gait   Trunk    Trunk (Right): Positive lateral lean over stance limb.     Pelvis    Pelvis (Left): Positive Trendelenburg.   Pelvis (Right): Positive Trendelenburg.           Assessment & Plan       Assessment  Impairments: abnormal gait, abnormal or restricted ROM, activity intolerance, impaired balance, impaired physical strength, lacks appropriate home exercise program, pain with function and weight-bearing intolerance   Functional limitations: sleeping, walking, uncomfortable because of pain, sitting, standing and stooping   Assessment details: Patient is a 69 year old female who comes to physical therapy with L hip pain. Signs and symptoms are consistent with hip arthritis with GT bursitis resulting in pain, decreased ROM, decreased strength, and inability to perform all essential functional activities. Pt will benefit from skilled PT services to address the above issues.       Prognosis: fair    Goals  Plan Goals: SHORT TERM GOALS:  2 weeks       1. Pt independent with HEP  2. Pt to demonstrate marquis hip strength 4/5 or greater to improve stability with ambulation  3. Pt to report being able to walk for 10 minutes without increasing pain in the left hip    LONG TERM GOALS:   6 weeks  1. Pt to demonstrate ability to perform full functional squat with good form and control of the hips and without increasing pain  2. Pt to demonstrate marquis hip strength to 4+/5 or greater to improve safety with ambulation on uneven surfaces  3. Pt to return to all normal daily activities without increased pain in the left hip(s)   4. Pt to  demonstrate ability to perform step up/down 8 inch step x10 safely and without pain in the left hip(s)         Plan  Therapy options: will be seen for skilled therapy services  Planned modality interventions: cryotherapy and thermotherapy (hydrocollator packs)  Planned therapy interventions: abdominal trunk stabilization, balance/weight-bearing training, flexibility, home exercise program, functional ROM exercises, joint mobilization, manual therapy, motor coordination training, soft tissue mobilization, neuromuscular re-education, strengthening, stretching, therapeutic activities and spinal/joint mobilization  Frequency: 2x week  Duration in weeks: 8  Treatment plan discussed with: patient        Timed Treatment:  Manual Therapy:         mins  92652;  Therapeutic Exercise:         mins  52004;     Neuromuscular Laurie:        mins  17386;    Therapeutic Activity:          mins  97142;     Gait Training:           mins  34304;     Ultrasound:          mins  55820;    Electrical Stimulation:         mins  99727 ( );  Dry Needling          mins self-pay  Iontophoresis          mins 37591    Untimed Treatments:  Electrical Stimulation:         mins  40550 ( );  Dry Needling:                     mins  Ultrasound:                         mins  82879;      Timed Treatment:      mins   Total Treatment:     40   mins    PT SIGNATURE: Garry Miguel PT   [place] License: [id number]  DATE TREATMENT INITIATED: 5/1/2025    Initial Certification  Certification Period: 7/29/2025  I certify that the therapy services are furnished while this patient is under my care.  The services outlined above are required by this patient, and will be reviewed every 90 days.     PHYSICIAN: Temo Husain DO      DATE:     Please sign and return via fax to [phone].. Thank you, [facility].

## 2025-05-01 NOTE — TELEPHONE ENCOUNTER
I spoke with Kelli at  Physical Therapy in [place]. Pt was seen today for an initial evaluation, but they need a new order for the hip (has an order for the back). Can you please place a PT referral for left hip pain to [facility]? Thank you!

## 2025-05-02 ENCOUNTER — HOSPITAL ENCOUNTER (OUTPATIENT)
Dept: GENERAL RADIOLOGY | Facility: HOSPITAL | Age: 70
Discharge: HOME | End: 2025-05-02
Payer: MEDICARE

## 2025-05-02 ENCOUNTER — HOSPITAL ENCOUNTER (OUTPATIENT)
Facility: HOSPITAL | Age: 70
Discharge: HOME | End: 2025-05-02
Payer: MEDICARE

## 2025-05-02 ENCOUNTER — TRANSCRIBE ORDERS (OUTPATIENT)
Dept: GENERAL RADIOLOGY | Facility: HOSPITAL | Age: 70
End: 2025-05-02
Payer: MEDICARE

## 2025-05-02 VITALS
HEART RATE: 85 BPM | DIASTOLIC BLOOD PRESSURE: 96 MMHG | SYSTOLIC BLOOD PRESSURE: 156 MMHG | RESPIRATION RATE: 20 BRPM | OXYGEN SATURATION: 96 % | BODY MASS INDEX: 36.72 KG/M2 | HEIGHT: 64 IN | WEIGHT: 215.1 LBS | TEMPERATURE: 98.1 F

## 2025-05-02 DIAGNOSIS — D80.1 COMMON VARIABLE AGAMMAGLOBULINEMIA: Primary | ICD-10-CM

## 2025-05-02 DIAGNOSIS — M25.552 LEFT HIP PAIN: Primary | ICD-10-CM

## 2025-05-02 DIAGNOSIS — M54.50 LOW BACK PAIN, UNSPECIFIED BACK PAIN LATERALITY, UNSPECIFIED CHRONICITY, UNSPECIFIED WHETHER SCIATICA PRESENT: ICD-10-CM

## 2025-05-02 DIAGNOSIS — M54.50 LOW BACK PAIN, UNSPECIFIED BACK PAIN LATERALITY, UNSPECIFIED CHRONICITY, UNSPECIFIED WHETHER SCIATICA PRESENT: Primary | ICD-10-CM

## 2025-05-02 DIAGNOSIS — Z95.828 PORT-A-CATH IN PLACE: ICD-10-CM

## 2025-05-02 PROCEDURE — 25010000002 IMMUNE GLOBULIN (HUMAN) 20 GM/200ML SOLUTION: Performed by: ALLERGY & IMMUNOLOGY

## 2025-05-02 PROCEDURE — 25010000002 HEPARIN LOCK FLUSH PER 10 UNITS: Performed by: ALLERGY & IMMUNOLOGY

## 2025-05-02 PROCEDURE — 96361 HYDRATE IV INFUSION ADD-ON: CPT

## 2025-05-02 PROCEDURE — 25810000003 SODIUM CHLORIDE 0.9 % SOLUTION: Performed by: ALLERGY & IMMUNOLOGY

## 2025-05-02 PROCEDURE — 72100 X-RAY EXAM L-S SPINE 2/3 VWS: CPT

## 2025-05-02 PROCEDURE — 25010000002 IMMUNE GLOBULIN (HUMAN) 10 GM/100ML SOLUTION: Performed by: ALLERGY & IMMUNOLOGY

## 2025-05-02 PROCEDURE — 96366 THER/PROPH/DIAG IV INF ADDON: CPT

## 2025-05-02 PROCEDURE — 96365 THER/PROPH/DIAG IV INF INIT: CPT

## 2025-05-02 RX ORDER — PREDNISONE 20 MG/1
40 TABLET ORAL ONCE
Start: 2025-05-16 | End: 2025-05-16

## 2025-05-02 RX ORDER — EPINEPHRINE 1 MG/ML
0.3 INJECTION, SOLUTION INTRAMUSCULAR; SUBCUTANEOUS ONCE AS NEEDED
Status: DISCONTINUED | OUTPATIENT
Start: 2025-05-02 | End: 2025-05-03 | Stop reason: HOSPADM

## 2025-05-02 RX ORDER — DIPHENHYDRAMINE HCL 25 MG
50 CAPSULE ORAL ONCE
Start: 2025-05-16 | End: 2025-05-16

## 2025-05-02 RX ORDER — DIPHENHYDRAMINE HCL 25 MG
50 CAPSULE ORAL ONCE AS NEEDED
Start: 2025-05-16

## 2025-05-02 RX ORDER — METHYLPREDNISOLONE SODIUM SUCCINATE 125 MG/2ML
50 INJECTION, POWDER, LYOPHILIZED, FOR SOLUTION INTRAMUSCULAR; INTRAVENOUS ONCE AS NEEDED
Status: DISCONTINUED | OUTPATIENT
Start: 2025-05-02 | End: 2025-05-03 | Stop reason: HOSPADM

## 2025-05-02 RX ORDER — EPINEPHRINE 1 MG/ML
0.3 INJECTION, SOLUTION INTRAMUSCULAR; SUBCUTANEOUS ONCE AS NEEDED
Start: 2025-05-16

## 2025-05-02 RX ORDER — METHYLPREDNISOLONE SODIUM SUCCINATE 125 MG/2ML
50 INJECTION, POWDER, LYOPHILIZED, FOR SOLUTION INTRAMUSCULAR; INTRAVENOUS ONCE AS NEEDED
Start: 2025-05-16

## 2025-05-02 RX ORDER — DIPHENHYDRAMINE HCL 25 MG
50 CAPSULE ORAL ONCE AS NEEDED
Status: DISCONTINUED | OUTPATIENT
Start: 2025-05-02 | End: 2025-05-03 | Stop reason: HOSPADM

## 2025-05-02 RX ORDER — PREDNISONE 20 MG/1
40 TABLET ORAL ONCE AS NEEDED
Status: DISCONTINUED | OUTPATIENT
Start: 2025-05-02 | End: 2025-05-03 | Stop reason: HOSPADM

## 2025-05-02 RX ORDER — ACETAMINOPHEN 325 MG/1
325 TABLET ORAL ONCE
OUTPATIENT
Start: 2025-05-16

## 2025-05-02 RX ORDER — HEPARIN SODIUM (PORCINE) LOCK FLUSH IV SOLN 100 UNIT/ML 100 UNIT/ML
500 SOLUTION INTRAVENOUS AS NEEDED
Status: DISCONTINUED | OUTPATIENT
Start: 2025-05-02 | End: 2025-05-03 | Stop reason: HOSPADM

## 2025-05-02 RX ORDER — PREDNISONE 20 MG/1
40 TABLET ORAL ONCE AS NEEDED
Start: 2025-05-16

## 2025-05-02 RX ADMIN — IMMUNE GLOBULIN INFUSION (HUMAN) 10 G: 100 INJECTION, SOLUTION INTRAVENOUS; SUBCUTANEOUS at 10:07

## 2025-05-02 RX ADMIN — IMMUNE GLOBULIN INFUSION (HUMAN) 20 G: 100 INJECTION, SOLUTION INTRAVENOUS; SUBCUTANEOUS at 11:08

## 2025-05-02 RX ADMIN — SODIUM CHLORIDE 500 ML: 9 INJECTION, SOLUTION INTRAVENOUS at 09:29

## 2025-05-02 RX ADMIN — IMMUNE GLOBULIN INFUSION (HUMAN) 20 G: 100 INJECTION, SOLUTION INTRAVENOUS; SUBCUTANEOUS at 12:42

## 2025-05-02 RX ADMIN — HEPARIN 500 UNITS: 100 SYRINGE at 13:58

## 2025-05-02 NOTE — CODE DOCUMENTATION
IVAD established on first attempt. Treatment provided per tx plan (see EMAR). Pt tolerated tx w/o complications. IVAD is heparin locked and needle d/c'd per protocol, AVS denied during visit d/t pt using mychart. Pt ambulated out of clinic w/o any acute s/sx of distress.     Pt requested medication be provided at 160mL/hr - orders do not state this, writer to reach out to provider to clarify. Writer left  at 1540 on 5/2/25 about clarifying orders of a max rate of 160mL/hr.

## 2025-05-07 ENCOUNTER — TREATMENT (OUTPATIENT)
Dept: PHYSICAL THERAPY | Facility: CLINIC | Age: 70
End: 2025-05-07
Payer: MEDICARE

## 2025-05-07 DIAGNOSIS — M25.552 PAIN OF LEFT HIP: Primary | ICD-10-CM

## 2025-05-07 PROCEDURE — 97110 THERAPEUTIC EXERCISES: CPT | Performed by: PHYSICAL THERAPIST

## 2025-05-07 PROCEDURE — 97140 MANUAL THERAPY 1/> REGIONS: CPT | Performed by: PHYSICAL THERAPIST

## 2025-05-07 NOTE — PROGRESS NOTES
Physical Therapy Daily Treatment Note      Visit #: 2    Raquel Mariee reports 8/10 pain today at rest.  Pt reports that her L hip continues to hurt all the time and is doing the HEP despite having discomfort.         Objective Pt present to PT today with no distress at rest.     Pt with some pain throughout session while performing exercises.     Pt with no limited mobility in the L hip with PROM with PT.     Pt tolerated mobilization of the L hip well without increased pain.     Pt with reduced pain with sidelying clams.       See Exercise, Manual, and Modality Logs for complete treatment.     Assessment/Plan  Pt continues to have limited function and pain in the L hip. Pt to continue with PT to help improve L hip strength, pain, and activity tolerance.       Progress per Plan of Care      Visit Diagnosis:    ICD-10-CM ICD-9-CM   1. Pain of left hip  M25.552 719.45              Timed Treatment:  Manual Therapy:    12     mins  26796;  Therapeutic Exercise:    12     mins  84548;     Neuromuscular Laurie:        mins  02129;    Therapeutic Activity:          mins  62622;     Gait Training:           mins  41946;     Ultrasound:          mins  44656;    Electrical Stimulation:         mins  20669 ( );  Dry Needling          mins self-pay  Iontophoresis          mins 25731    Untimed Treatments:  Electrical Stimulation:         mins  82409 (MC );  Dry Needling:                     mins  Ultrasound:                         mins  80593;        Timed Treatment:   24   mins   Total Treatment:     50   mins    Garry Miguel, PT  Physical Therapist

## 2025-05-08 ENCOUNTER — OFFICE VISIT (OUTPATIENT)
Dept: INTERNAL MEDICINE | Facility: CLINIC | Age: 70
End: 2025-05-08
Payer: MEDICARE

## 2025-05-08 ENCOUNTER — OFFICE VISIT (OUTPATIENT)
Dept: ORTHOPEDIC SURGERY | Facility: CLINIC | Age: 70
End: 2025-05-08
Payer: MEDICARE

## 2025-05-08 VITALS
OXYGEN SATURATION: 96 % | WEIGHT: 215 LBS | TEMPERATURE: 97.8 F | DIASTOLIC BLOOD PRESSURE: 88 MMHG | SYSTOLIC BLOOD PRESSURE: 124 MMHG | BODY MASS INDEX: 36.7 KG/M2 | RESPIRATION RATE: 17 BRPM | HEIGHT: 64 IN | HEART RATE: 102 BPM

## 2025-05-08 VITALS
DIASTOLIC BLOOD PRESSURE: 84 MMHG | HEIGHT: 64 IN | WEIGHT: 215 LBS | BODY MASS INDEX: 36.7 KG/M2 | SYSTOLIC BLOOD PRESSURE: 138 MMHG

## 2025-05-08 DIAGNOSIS — Z00.00 ROUTINE GENERAL MEDICAL EXAMINATION AT A HEALTH CARE FACILITY: ICD-10-CM

## 2025-05-08 DIAGNOSIS — J32.0 MAXILLARY SINUSITIS, UNSPECIFIED CHRONICITY: ICD-10-CM

## 2025-05-08 DIAGNOSIS — M16.12 PRIMARY OSTEOARTHRITIS OF LEFT HIP: ICD-10-CM

## 2025-05-08 DIAGNOSIS — M25.552 LEFT HIP PAIN: ICD-10-CM

## 2025-05-08 DIAGNOSIS — M76.12 PSOAS TENDINITIS OF LEFT SIDE: ICD-10-CM

## 2025-05-08 DIAGNOSIS — M54.50 LEFT LOW BACK PAIN, UNSPECIFIED CHRONICITY, UNSPECIFIED WHETHER SCIATICA PRESENT: Primary | ICD-10-CM

## 2025-05-08 DIAGNOSIS — E11.9 TYPE 2 DIABETES MELLITUS WITHOUT COMPLICATION, WITH LONG-TERM CURRENT USE OF INSULIN: ICD-10-CM

## 2025-05-08 DIAGNOSIS — E66.812 CLASS 2 OBESITY WITHOUT SERIOUS COMORBIDITY WITH BODY MASS INDEX (BMI) OF 36.0 TO 36.9 IN ADULT, UNSPECIFIED OBESITY TYPE: ICD-10-CM

## 2025-05-08 DIAGNOSIS — Z79.4 TYPE 2 DIABETES MELLITUS WITHOUT COMPLICATION, WITH LONG-TERM CURRENT USE OF INSULIN: ICD-10-CM

## 2025-05-08 DIAGNOSIS — M70.62 TROCHANTERIC BURSITIS OF LEFT HIP: Primary | ICD-10-CM

## 2025-05-08 DIAGNOSIS — G57.02 PIRIFORMIS SYNDROME OF LEFT SIDE: ICD-10-CM

## 2025-05-08 PROCEDURE — 3079F DIAST BP 80-89 MM HG: CPT | Performed by: PHYSICIAN ASSISTANT

## 2025-05-08 PROCEDURE — 99203 OFFICE O/P NEW LOW 30 MIN: CPT | Performed by: PHYSICIAN ASSISTANT

## 2025-05-08 PROCEDURE — 3075F SYST BP GE 130 - 139MM HG: CPT | Performed by: PHYSICIAN ASSISTANT

## 2025-05-08 RX ORDER — CYCLOBENZAPRINE HCL 10 MG
10 TABLET ORAL 3 TIMES DAILY PRN
Qty: 20 TABLET | Refills: 0 | Status: SHIPPED | OUTPATIENT
Start: 2025-05-08

## 2025-05-08 NOTE — PROGRESS NOTES
Subjective     Patient ID: Raquel Mariee is a 69 y.o. female. Patient is here for management of multiple medical problems.     Chief Complaint   Patient presents with    Back Pain    Hip Pain     Left hip pain radiating down into the groin and into the lef knee     History of Present Illness   Seen Dr Shi LIGHT and told.  Hip pain is for lower back.    Taking T#3.  Given trmadol. Using ten      Pain in buttox and andterior hip and troch.   Better after PT until ice added.  Then wores.      Standing anterior hip pain.     Ttp over troch.           The following portions of the patient's history were reviewed and updated as appropriate: allergies, current medications, past family history, past medical history, past social history, past surgical history and problem list.    Review of Systems    Current Outpatient Medications:     acetaminophen-codeine (TYLENOL with CODEINE #3) 300-30 MG per tablet, Take 1 tablet by mouth Every 4 (Four) Hours As Needed for Moderate Pain., Disp: 15 tablet, Rfl: 1    albuterol sulfate  (90 Base) MCG/ACT inhaler, Inhale 2 puffs by mouth every 4 to 6 hours as needed for cough, wheeze, shortness of breath. Use with vortex spacer, Disp: 8.5 g, Rfl: 3    allopurinol (ZYLOPRIM) 100 MG tablet, Take 1 tablet by mouth Daily., Disp: 90 tablet, Rfl: 3    betamethasone valerate (VALISONE) 0.1 % cream, Apply topically to the appropriate area as directed Daily. (Patient taking differently: Apply  topically to the appropriate area as directed Daily As Needed.), Disp: 45 g, Rfl: 11    Budeson-Glycopyrrol-Formoterol (BREZTRI) 160-9-4.8 MCG/ACT aerosol inhaler, Inhale 2 puffs by mouth 2 (Two) Times a Day. Rinse out mouth after use, Disp: 32.1 g, Rfl: 3    buPROPion SR (WELLBUTRIN SR) 150 MG 12 hr tablet, Take 1 tablet by mouth 2 (Two) Times a Day., Disp: 180 tablet, Rfl: 3    calcium carbonate (Antacid Maximum) 1000 MG chewable tablet, Chew 500 mg 3 (Three) Times a Day., Disp: , Rfl:      cholecalciferol (VITAMIN D3) 25 MCG (1000 UT) tablet, Take 1 tablet by mouth Daily., Disp: , Rfl:     denosumab (PROLIA) 60 MG/ML solution prefilled syringe syringe, Inject 1 mL under the skin into the appropriate area as directed Every 6 (Six) Months., Disp: , Rfl:     diphenhydrAMINE (Benadryl Allergy) 25 MG tablet, Take 1-2 tablets by mouth Every 4-6 Hours As Needed., Disp: 90 tablet, Rfl: 11    doxycycline (VIBRAMYCIN) 100 MG capsule, Take 1 capsule by mouth 2 (Two) Times a Day., Disp: 20 capsule, Rfl: 0    DULoxetine (Cymbalta) 60 MG capsule, Take 1 capsule by mouth Daily., Disp: 90 capsule, Rfl: 3    estradiol (ESTRACE) 0.1 MG/GM vaginal cream, Insert 0.5 grams vaginally twice weekly, Disp: 42.5 g, Rfl: 5    ferrous sulfate 325 (65 FE) MG EC tablet, Take 2 tablets by mouth Daily With Breakfast., Disp: , Rfl:     fexofenadine (ALLEGRA) 180 MG tablet, Take 1 tablet by mouth Daily., Disp: , Rfl:     Fluticasone Propionate (Xhance) 93 MCG/ACT Exhaler Suspension, Instill 1 spray in both nostrils twice a day, Disp: 16 mL, Rfl: 11    glucosamine-chondroitin 500-400 MG capsule capsule, Take 1 capsule by mouth 2 (Two) Times a Day With Meals., Disp: , Rfl:     guaiFENesin-codeine (ROMILAR-AC) 100-10 MG/5ML solution/syrup, Take 5 mL by mouth 3 (Three) Times a Day As Needed for Cough or Congestion., Disp: 90 mL, Rfl: 0    Hydrocortisone, Perianal, (Anusol-HC) 2.5 % rectal cream, Apply rectally 2 times daily, Disp: 30 g, Rfl: 2    Hypertonic Nasal Wash (Sinus Rinse Kit) pack, use once or twice daily as needed, Disp: 1 each, Rfl: 3    immune globulin, human, (Gammagard) 20 GM/200ML solution infusion, Infuse 40 g into a venous catheter Every 28 (Twenty-Eight) Days., Disp: 400 mL, Rfl: 0    immune globulin, human, (Gammagard) 30 GM/300ML solution infusion, 40 grams IV every 4 weeks, Disp: 40 mL, Rfl: 11    immune globulin, human, (Gammagard) 30 GM/300ML solution infusion, 50 grams IV every 4 weeks, Disp: 300 mL, Rfl: 6     ipratropium (ATROVENT) 0.06 % nasal spray, Instill 1-2 sprays in each nostril 2-3 times daily as needed, Disp: 15 mL, Rfl: 3    ipratropium-albuterol (DUO-NEB) 0.5-2.5 mg/3 ml nebulizer, Inhale the contents of 1 vial through a nebulizer every 4-6 hours as needed for cough,wheezing and shortness of breath., Disp: 90 mL, Rfl: 3    isosorbide mononitrate (IMDUR) 30 MG 24 hr tablet, Take 1 tablet by mouth Every Morning., Disp: 90 tablet, Rfl: 0    levothyroxine (SYNTHROID, LEVOTHROID) 50 MCG tablet, Take 1 tablet by mouth Daily., Disp: 90 tablet, Rfl: 3    lidocaine (XYLOCAINE) 5 % ointment, Apply 1 Application topically to the appropriate area as directed Every 2 (Two) Hours As Needed for Mild Pain., Disp: 50 g, Rfl: 0    linaclotide (Linzess) 145 MCG capsule capsule, Take 1 capsule by mouth Every Morning Before Breakfast., Disp: 30 capsule, Rfl: 11    Magnesium 250 MG tablet, Take 2 tablets by mouth 2 (Two) Times a Day., Disp: , Rfl:     Mepolizumab (Nucala) 100 MG/ML solution auto-injector, Inject 1 mL under the skin into the appropriate area as directed Every 28 (Twenty-Eight) Days. Obtaining medication from [facility], Disp: , Rfl:     Misc. Devices (Sitz Bath) misc, Use 1 each As Needed (for hemorrhoids)., Disp: 1 each, Rfl: 0    montelukast (SINGULAIR) 10 MG tablet, Take 1 tablet by mouth every night at bedtime., Disp: 90 tablet, Rfl: 3    multivitamin with minerals tablet tablet, Take 1 tablet by mouth Daily., Disp: , Rfl:     omeprazole (priLOSEC) 40 MG capsule, Take 1 capsule by mouth 2 (Two) Times a Day., Disp: 180 capsule, Rfl: 3    ondansetron (ZOFRAN) 4 MG tablet, Take 1 tablet by mouth Every 8 (Eight) Hours As Needed for Nausea or Vomiting., Disp: 30 tablet, Rfl: 11    potassium citrate (UROCIT-K) 10 MEQ (1080 MG) CR tablet, Take 1 tablet by mouth Daily., Disp: 90 tablet, Rfl: 3    predniSONE (DELTASONE) 20 MG tablet, Take 2 tablets prior to Gammgard infusion, Disp: 6 tablet, Rfl: 3     "predniSONE (DELTASONE) 20 MG tablet, Take 2 tablets by mouth Daily., Disp: 10 tablet, Rfl: 0    solifenacin (VESIcare) 5 MG tablet, Take 1 tablet by mouth Daily., Disp: 30 tablet, Rfl: 3    tiZANidine (ZANAFLEX) 4 MG tablet, Take 1 tablet by mouth 2 (Two) Times a Day As Needed for Muscle Spasms., Disp: 100 tablet, Rfl: 3    ubrogepant (Ubrelvy) 100 MG tablet, Take 1 tablet by mouth 1 (One) Time As Needed (migraine)., Disp: 4 tablet, Rfl: 0    valsartan (DIOVAN) 160 MG tablet, Take 1 tablet by mouth Daily., Disp: 90 tablet, Rfl: 3    vitamin B-12 (CYANOCOBALAMIN) 1000 MCG tablet, Take 1 tablet by mouth Daily., Disp: , Rfl:     cyclobenzaprine (FLEXERIL) 10 MG tablet, Take 1 tablet by mouth 3 (Three) Times a Day As Needed for Muscle Spasms., Disp: 20 tablet, Rfl: 0    Objective      Blood pressure 124/88, pulse 102, temperature 97.8 °F (36.6 °C), resp. rate 17, height 162.6 cm (64\"), weight 97.5 kg (215 lb), SpO2 96%, not currently breastfeeding.            Physical Exam     General Appearance:    Alert, cooperative, no distress, appears stated age   Head:    Normocephalic, without obvious abnormality, atraumatic   Eyes:    PERRL, conjunctiva/corneas clear, EOM's intact   Ears:    Normal TM's and external ear canals, both ears   Nose:   Nares normal, septum midline, mucosa normal, no drainage   or sinus tenderness   Throat:   Lips, mucosa, and tongue normal; teeth and gums normal   Neck:   Supple, symmetrical, trachea midline, no adenopathy;        thyroid:  No enlargement/tenderness/nodules; no carotid    bruit or JVD   Back:     Symmetric, no curvature, ROM normal, no CVA tenderness   Lungs:     Clear to auscultation bilaterally, respirations unlabored   Chest wall:    No tenderness or deformity   Heart:    Regular rate and rhythm, S1 and S2 normal, no murmur,        rub or gallop   Abdomen:     Soft, non-tender, bowel sounds active all four quadrants,     no masses, no organomegaly   Extremities:   Extremities " normal, atraumatic, no cyanosis or edema   Pulses:   2+ and symmetric all extremities   Skin:   Skin color, texture, turgor normal, no rashes or lesions   Lymph nodes:   Cervical, supraclavicular, and axillary nodes normal   Neurologic:   CNII-XII intact. Normal strength, sensation and reflexes       throughout      Results for orders placed or performed during the hospital encounter of 04/04/25   Comprehensive Metabolic Panel    Collection Time: 04/04/25  9:37 AM    Specimen: Blood   Result Value Ref Range    Glucose 136 (H) 65 - 99 mg/dL    BUN 17 8 - 23 mg/dL    Creatinine 1.28 (H) 0.57 - 1.00 mg/dL    Sodium 136 136 - 145 mmol/L    Potassium 4.1 3.5 - 5.2 mmol/L    Chloride 100 98 - 107 mmol/L    CO2 24.4 22.0 - 29.0 mmol/L    Calcium 9.5 8.6 - 10.5 mg/dL    Total Protein 7.1 6.0 - 8.5 g/dL    Albumin 4.3 3.5 - 5.2 g/dL    ALT (SGPT) 20 1 - 33 U/L    AST (SGOT) 28 1 - 32 U/L    Alkaline Phosphatase 61 39 - 117 U/L    Total Bilirubin 0.6 0.0 - 1.2 mg/dL    Globulin 2.8 gm/dL    A/G Ratio 1.5 g/dL    BUN/Creatinine Ratio 13.3 7.0 - 25.0    Anion Gap 11.6 5.0 - 15.0 mmol/L    eGFR 45.4 (L) >60.0 mL/min/1.73   IgG    Collection Time: 04/04/25  9:37 AM    Specimen: Blood   Result Value Ref Range    IgG 1,025 700 - 1,600 mg/dL   CBC Auto Differential    Collection Time: 04/04/25  9:37 AM    Specimen: Blood   Result Value Ref Range    WBC 9.10 3.40 - 10.80 10*3/mm3    RBC 4.39 3.77 - 5.28 10*6/mm3    Hemoglobin 14.0 12.0 - 15.9 g/dL    Hematocrit 40.8 34.0 - 46.6 %    MCV 92.9 79.0 - 97.0 fL    MCH 31.9 26.6 - 33.0 pg    MCHC 34.3 31.5 - 35.7 g/dL    RDW 13.5 12.3 - 15.4 %    RDW-SD 45.8 37.0 - 54.0 fl    MPV 9.2 6.0 - 12.0 fL    Platelets 286 140 - 450 10*3/mm3    Neutrophil % 76.0 42.7 - 76.0 %    Lymphocyte % 15.2 (L) 19.6 - 45.3 %    Monocyte % 6.3 5.0 - 12.0 %    Eosinophil % 0.5 0.3 - 6.2 %    Basophil % 1.0 0.0 - 1.5 %    Immature Grans % 1.0 (H) 0.0 - 0.5 %    Neutrophils, Absolute 6.92 1.70 - 7.00 10*3/mm3     Lymphocytes, Absolute 1.38 0.70 - 3.10 10*3/mm3    Monocytes, Absolute 0.57 0.10 - 0.90 10*3/mm3    Eosinophils, Absolute 0.05 0.00 - 0.40 10*3/mm3    Basophils, Absolute 0.09 0.00 - 0.20 10*3/mm3    Immature Grans, Absolute 0.09 (H) 0.00 - 0.05 10*3/mm3    nRBC 0.0 0.0 - 0.2 /100 WBC     *Note: Due to a large number of results and/or encounters for the requested time period, some results have not been displayed. A complete set of results can be found in Results Review.         Assessment & Plan   Seen Dr Shi LIGHT and told.  Hip pain is for lower back.    Taking T#3.  Given trmadol. Using ten      Pain in buttox and andterior hip and troch.   Better after PT until ice added.  Then wores.      Standing anterior hip pain.     Ttp over troch.       Looks like piriformis syndrome and a tight psoas muscle contributing to abnormal gait causing torch bursitis.    Stretching psaos and piriformis is only real option and get torch inj on Monday.    Start foam roller. Stretching exercises.        Diagnoses and all orders for this visit:    1. Trochanteric bursitis of left hip (Primary)    2. Piriformis syndrome of left side    3. Psoas tendinitis of left side    Other orders  -     cyclobenzaprine (FLEXERIL) 10 MG tablet; Take 1 tablet by mouth 3 (Three) Times a Day As Needed for Muscle Spasms.  Dispense: 20 tablet; Refill: 0      No follow-ups on file.          There are no Patient Instructions on file for this visit.     Sanjeev Rawls MD    Assessment & Plan

## 2025-05-08 NOTE — PROGRESS NOTES
[facility] Note    Subjective     Chief Complaint   Patient presents with    Left Hip - Pain        HPI  Raquel Mariee is a 69 y.o. female.  New patient presents for evaluation of left hip pain.  Patient places her hand on her left buttock and states the pain travels into the lateral aspect of the hip and into the groin and down to the knee.  She underwent trochanteric bursa injection 3/10/2025 but states that did not help and the pain is getting worse.  She has been prescribed Tylenol 3.  She is here today for further evaluation and treatment.  No history of injury or trauma.    Pain scale: 8/10.  Severity of the pain moderate to severe.  Quality of the pain dull, stabbing.  Associated symptoms pain.  Activity related to pain walking.  Pain eased by nothing.  No reported numbness or tingling.  Prior treatments physical therapy.  Currently using a cane to assist with ambulation.  Patient has also received SI joint injections.  Last injection was approximately 3 months ago.    Denies fever, chills, night sweats or other constitutional symptoms.    Past Medical History:   Diagnosis Date    Anxiety     Aortic valve stenosis     Cardiac murmur     Cataract, bilateral     CHF (congestive heart failure)     Cholelithiasis GB out    Chronic kidney disease     Stage III    Clostridium difficile infection     in her 30s    Colon polyp Ever since having colonoscopies.    Common variable immunodeficiency     IVIG infusions    COPD (chronic obstructive pulmonary disease)     Coronary artery disease     Prinz metal angina & aortic stenosis    Depression     Diabetes mellitus     type II    Eosinophilic asthma     Fibromyalgia, primary     Full dentures     GERD (gastroesophageal reflux disease)     History of transfusion     in [facility] at [facility].  No reaction noted, patient states she does have an antibody from transfusion.    Hypertension     Hypogammaglobulinemia     Hypothyroidism      Irritable bowel syndrome     Migraines     Morbid obesity     Optic neuritis     Optic neuropathy     Osteoarthritis     Prinzmetal angina 1990    Pseudomonas infection 1998    lungs    PTSD (post-traumatic stress disorder)     Pulmonary edema     Renal insufficiency     Sinus tachycardia 1990    Sleep apnea     no longer uses/needs cpap    Stroke     TIA about 7 years ago    Tachycardia     TIA (transient ischemic attack) 2017    Trochanteric bursitis 01/25/2023      Past Surgical History:   Procedure Laterality Date    APPENDECTOMY      BUNIONECTOMY Bilateral     CARDIAC CATHETERIZATION  2017    no stents needed    CARPAL TUNNEL RELEASE Right     COLONOSCOPY  2021    ENDOMETRIAL ABLATION  1997    EYE SURGERY  ? About 6-8 years ago    Laser for glaucoma    FRACTURE SURGERY  1995    No hardware; Tibeal plateau    GASTRIC BYPASS      HAND SURGERY Left     No hardware    HEMORRHOIDECTOMY N/A 04/09/2024    Procedure: HEMORRHOID BANDING X2;  Surgeon: Melissa Beck MD;  Location: [facility] OR;  Service: General;  Laterality: N/A;    INTERSTIM PLACEMENT N/A 05/03/2023    Procedure: INTERSTIM STAGES 1 AND 2 LEAD AND GENERATOR PLACEMENT;  Surgeon: Abner Nation MD;  Location: [facility] OR;  Service: Urology;  Laterality: N/A;    POSTERIOR VAGINAL REPAIR N/A 08/02/2023    Procedure: POSTERIOR COLPORRHAPHY;  Surgeon: Kellen Galan MD;  Location:  [facility] OR;  Service: Gynecology;  Laterality: N/A;    RECTAL EXAMINATION UNDER ANESTHESIA N/A 04/09/2024    Procedure: RECTAL EXAM UNDER ANESTHESIA;  Surgeon: Melissa Beck MD;  Location: [facility] OR;  Service: General;  Laterality: N/A;    REPLACEMENT TOTAL KNEE BILATERAL      TEETH EXTRACTION      all of bottom teeth removed    THORACOTOMY      TONSILLECTOMY      TOTAL ABDOMINAL HYSTERECTOMY WITH SALPINGO OOPHORECTOMY      TRACHEAL SURGERY      Repaired via thoracotomy    TUBAL ABDOMINAL LIGATION  1983    VENOUS ACCESS DEVICE (PORT) INSERTION      port a cath in  the left chest wall      Family History   Problem Relation Age of Onset    Diabetes Mother     Stroke Mother     Heart disease Mother     Hypertension Mother     Endometriosis Mother     Depression Mother     Diabetes Father     Hypertension Father     Stroke Father     Heart attack Father     Asthma Father     COPD Father     Dementia Father     Heart disease Father     COPD Sister     Asthma Sister     Cancer Sister         Nasal cell skin    Brain cancer Sister     Diabetes Sister     Stroke Sister     Heart attack Sister     Endometriosis Sister     Depression Sister     Arthritis Sister         Rheumatoid    Hypertension Sister     Kidney disease Sister     Diabetes Sister     COPD Sister     Asthma Sister     Endometriosis Sister     Depression Sister     Cancer Sister         Glioma    Heart disease Sister         MI    Heart attack Sister     Endometriosis Sister     Asthma Sister     Hypertension Sister     Kidney disease Sister         ESRD    COPD Brother     Asthma Brother         Turned into COPD    Diabetes Brother     Asthma Brother     COPD Brother     Diabetes Brother     Hypertension Brother     Asthma Daughter     Endometriosis Daughter     Osteoarthritis Daughter     Hypertension Daughter     Kidney failure Daughter     Irritable bowel syndrome Daughter     Anxiety disorder Daughter     Bipolar disorder Daughter     Depression Daughter     Self-Injurious Behavior  Daughter     Suicide Attempts Daughter     Mental illness Daughter         Bipolar and eating fisorder    Asthma Daughter     Endometriosis Daughter     Osteoarthritis Daughter     Asthma Son     ADD / ADHD Son     Alcohol abuse Son     Drug abuse Son     Breast cancer Maternal Cousin     Heart disease Maternal Grandfather     Heart disease Maternal Uncle     OCD Neg Hx     Paranoid behavior Neg Hx     Schizophrenia Neg Hx     Seizures Neg Hx     Ovarian cancer Neg Hx      Social History     Socioeconomic History    Marital status:     Tobacco Use    Smoking status: Never     Passive exposure: Never    Smokeless tobacco: Never   Vaping Use    Vaping status: Never Used   Substance and Sexual Activity    Alcohol use: Yes     Comment: ONCE A YEAR    Drug use: Never    Sexual activity: Defer      Current Outpatient Medications on File Prior to Visit   Medication Sig Dispense Refill    acetaminophen-codeine (TYLENOL with CODEINE #3) 300-30 MG per tablet Take 1 tablet by mouth Every 4 (Four) Hours As Needed for Moderate Pain. 15 tablet 1    albuterol sulfate  (90 Base) MCG/ACT inhaler Inhale 2 puffs by mouth every 4 to 6 hours as needed for cough, wheeze, shortness of breath. Use with vortex spacer 8.5 g 3    allopurinol (ZYLOPRIM) 100 MG tablet Take 1 tablet by mouth Daily. 90 tablet 3    betamethasone valerate (VALISONE) 0.1 % cream Apply topically to the appropriate area as directed Daily. (Patient taking differently: Apply  topically to the appropriate area as directed Daily As Needed.) 45 g 11    Budeson-Glycopyrrol-Formoterol (BREZTRI) 160-9-4.8 MCG/ACT aerosol inhaler Inhale 2 puffs by mouth 2 (Two) Times a Day. Rinse out mouth after use 32.1 g 3    buPROPion SR (WELLBUTRIN SR) 150 MG 12 hr tablet Take 1 tablet by mouth 2 (Two) Times a Day. 180 tablet 3    calcium carbonate (Antacid Maximum) 1000 MG chewable tablet Chew 500 mg 3 (Three) Times a Day.      cholecalciferol (VITAMIN D3) 25 MCG (1000 UT) tablet Take 1 tablet by mouth Daily.      denosumab (PROLIA) 60 MG/ML solution prefilled syringe syringe Inject 1 mL under the skin into the appropriate area as directed Every 6 (Six) Months.      diphenhydrAMINE (Benadryl Allergy) 25 MG tablet Take 1-2 tablets by mouth Every 4-6 Hours As Needed. 90 tablet 11    doxycycline (VIBRAMYCIN) 100 MG capsule Take 1 capsule by mouth 2 (Two) Times a Day. 20 capsule 0    DULoxetine (Cymbalta) 60 MG capsule Take 1 capsule by mouth Daily. 90 capsule 3    estradiol (ESTRACE) 0.1 MG/GM vaginal  cream Insert 0.5 grams vaginally twice weekly 42.5 g 5    ferrous sulfate 325 (65 FE) MG EC tablet Take 2 tablets by mouth Daily With Breakfast.      fexofenadine (ALLEGRA) 180 MG tablet Take 1 tablet by mouth Daily.      Fluticasone Propionate (Xhance) 93 MCG/ACT Exhaler Suspension Instill 1 spray in both nostrils twice a day 16 mL 11    glucosamine-chondroitin 500-400 MG capsule capsule Take 1 capsule by mouth 2 (Two) Times a Day With Meals.      guaiFENesin-codeine (ROMILAR-AC) 100-10 MG/5ML solution/syrup Take 5 mL by mouth 3 (Three) Times a Day As Needed for Cough or Congestion. 90 mL 0    Hydrocortisone, Perianal, (Anusol-HC) 2.5 % rectal cream Apply rectally 2 times daily 30 g 2    Hypertonic Nasal Wash (Sinus Rinse Kit) pack use once or twice daily as needed 1 each 3    immune globulin, human, (Gammagard) 20 GM/200ML solution infusion Infuse 40 g into a venous catheter Every 28 (Twenty-Eight) Days. 400 mL 0    immune globulin, human, (Gammagard) 30 GM/300ML solution infusion 40 grams IV every 4 weeks 40 mL 11    immune globulin, human, (Gammagard) 30 GM/300ML solution infusion 50 grams IV every 4 weeks 300 mL 6    ipratropium (ATROVENT) 0.06 % nasal spray Instill 1-2 sprays in each nostril 2-3 times daily as needed 15 mL 3    ipratropium-albuterol (DUO-NEB) 0.5-2.5 mg/3 ml nebulizer Inhale the contents of 1 vial through a nebulizer every 4-6 hours as needed for cough,wheezing and shortness of breath. 90 mL 3    isosorbide mononitrate (IMDUR) 30 MG 24 hr tablet Take 1 tablet by mouth Every Morning. 90 tablet 0    levothyroxine (SYNTHROID, LEVOTHROID) 50 MCG tablet Take 1 tablet by mouth Daily. 90 tablet 3    lidocaine (XYLOCAINE) 5 % ointment Apply 1 Application topically to the appropriate area as directed Every 2 (Two) Hours As Needed for Mild Pain. 50 g 0    linaclotide (Linzess) 145 MCG capsule capsule Take 1 capsule by mouth Every Morning Before Breakfast. 30 capsule 11    Magnesium 250 MG tablet Take 2  tablets by mouth 2 (Two) Times a Day.      Mepolizumab (Nucala) 100 MG/ML solution auto-injector Inject 1 mL under the skin into the appropriate area as directed Every 28 (Twenty-Eight) Days. Obtaining medication from [place] [facility]. Devices (Sitz Bath) mis Use 1 each As Needed (for hemorrhoids). 1 each 0    montelukast (SINGULAIR) 10 MG tablet Take 1 tablet by mouth every night at bedtime. 90 tablet 3    multivitamin with minerals tablet tablet Take 1 tablet by mouth Daily.      omeprazole (priLOSEC) 40 MG capsule Take 1 capsule by mouth 2 (Two) Times a Day. 180 capsule 3    ondansetron (ZOFRAN) 4 MG tablet Take 1 tablet by mouth Every 8 (Eight) Hours As Needed for Nausea or Vomiting. 30 tablet 11    potassium citrate (UROCIT-K) 10 MEQ (1080 MG) CR tablet Take 1 tablet by mouth Daily. 90 tablet 3    predniSONE (DELTASONE) 20 MG tablet Take 2 tablets prior to Gammgard infusion 6 tablet 3    predniSONE (DELTASONE) 20 MG tablet Take 2 tablets by mouth Daily. 10 tablet 0    solifenacin (VESIcare) 5 MG tablet Take 1 tablet by mouth Daily. 30 tablet 3    tiZANidine (ZANAFLEX) 4 MG tablet Take 1 tablet by mouth 2 (Two) Times a Day As Needed for Muscle Spasms. 100 tablet 3    valsartan (DIOVAN) 160 MG tablet Take 1 tablet by mouth Daily. 90 tablet 3    vitamin B-12 (CYANOCOBALAMIN) 1000 MCG tablet Take 1 tablet by mouth Daily.      [DISCONTINUED] ubrogepant (Ubrelvy) 100 MG tablet Take 1 tablet by mouth 1 (One) Time As Needed (migraine). 4 tablet 0    [DISCONTINUED] fluticasone-salmeterol (Advair HFA) 230-21 MCG/ACT inhaler Inhale 2 puffs by mouth 2 (Two) Times a Day. 12 g 11    [DISCONTINUED] tiotropium bromide monohydrate (Spiriva Respimat) 2.5 MCG/ACT aerosol solution inhaler Inhale 2 puffs by mouth daily 4 g 11     No current facility-administered medications on file prior to visit.      Allergies   Allergen Reactions    Cefaclor Hives and Swelling     Other reaction(s): SWELLING  Shed 4 layers of  "skin and extremely SOB  Cesario Bishop's syndrome        Cephalexin Other (See Comments)     Cesario-Bishop's syndrome      Cephalosporins Hives, Swelling and Angioedema     Rechallenge with cefipime caused rash on 1/14/08.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    Verapamil Nausea And Vomiting     Dizzy    Zolpidem Other (See Comments) and Unknown (See Comments)     Automatic behaviour in sleep.  confusion    Amoxicillin Rash    Edecrin [Ethacrynic Acid] Rash and Other (See Comments)     Weight gain    Hydrochlorothiazide Hives    Sulfa Antibiotics Hives        The following portions of the patient's history were reviewed and updated as appropriate: allergies, current medications, past family history, past medical history, past social history, past surgical history, and problem list.    Review of Systems   Constitutional: Negative.    HENT: Negative.     Eyes: Negative.    Respiratory: Negative.     Cardiovascular: Negative.    Gastrointestinal: Negative.    Endocrine: Negative.    Genitourinary: Negative.    Musculoskeletal:  Positive for arthralgias.   Skin: Negative.    Allergic/Immunologic: Negative.    Neurological: Negative.    Hematological: Negative.    Psychiatric/Behavioral: Negative.          Objective      Physical Exam  /84   Ht 162.6 cm (64\")   Wt 97.5 kg (215 lb)   BMI 36.90 kg/m²     Body mass index is 36.9 kg/m².    GENERAL APPEARANCE: awake, alert & oriented x 3, in no acute distress " and well developed, well nourished  PSYCH: normal mood and affect  LUNGS:  breathing nonlabored, no wheezing  EYES: sclera anicteric, pupils equal  CARDIOVASCULAR: palpable pulses. Capillary refill less than 2 seconds  INTEGUMENTARY: skin intact, no clubbing, cyanosis  NEUROLOGIC:  Normal Sensation         Ortho Exam  Left hip  Skin: Grossly intact without any erythema, warmth or swelling.  Tenderness: Positive tenderness left low back, left buttock, lateral aspect of the hip, groin.  Pain also radiates to the knee.    Motion: Flx 110°, internal rotation 30°, external rotation 30°.   Testing: [facility] slight discomfort, Passive hip flx to 90° with IR/ER slight discomfort.  Strength: Hip flx/ext/abd 5/5, Q/HS 5/5.  Motor: Grossly intact Q/HS/TA/GS/EHL/P.  Sensory: Grossly intact to LT L2-S1.      Imaging/Studies  Dr. Jimenes and I reviewed plain film imaging performed on 3/10/2025.  PROCEDURE: XR HIPS BILATERAL W OR WO PELVIS 2 VIEW-     HISTORY: pain; M25.551-Pain in right hip; M25.552-Pain in left hip     COMPARISON: None.     FINDINGS:  A 2 view exam of each hip demonstrates no acute fracture or  dislocation. There are degenerative changes of the SI joints. There is  mild narrowing of the left hip joint. Multiple surgical clips are seen  in the pelvis projecting over the superior rami bilaterally. A bladder  stimulator is present. There is osteopenia.     IMPRESSION:  No acute fracture.     Chronic changes as above.        Images were reviewed, interpreted, and dictated by Dr. Lisa Castellon MD  Transcribed by Leyda Rachel PA-C.     This report was signed and finalized on 3/10/2025 12:10 PM by Lisa Castellon MD.  Assessment/Plan        ICD-10-CM ICD-9-CM   1. Left low back pain, unspecified chronicity, unspecified whether sciatica present  M54.50 724.2   2. Left hip pain  M25.552 719.45   3. Primary osteoarthritis of left hip  M16.12 715.15   4. Class 2 obesity without serious comorbidity with body mass index  (BMI) of 36.0 to 36.9 in adult, unspecified obesity type  E66.812 278.00    Z68.36 V85.36       No orders of the defined types were placed in this encounter.       -Left low back pain, left hip pain in setting of left hip osteoarthritis.  -Reviewed imaging with the patient.  -Unsure if hip arthritis is truly the cause of her pain at this time.  She did have a trochanteric injection that was of no benefit.    -Treatment notes were discussed with the patient and recommend proceeding with diagnostic/therapeutic intra-articular hip injection.  Patient is agreeable to this.    -Refer to Dr. Tomlin for the ultrasound-guided injection.  -Recommend OTC NSAIDS/pain medication as needed.  Patient has been using Tylenol 3.  If she continues to need narcotic pain medication she will have to obtain through PCP.  -Obesity--patient has a BMI of 36.9.  The patient has been instructed on various weight loss avenues including diet, portion control, calorie restriction, low/no impact exercise.  It was explained that weight loss can improve joint pain alone by decreasing the joint reaction forces.  For every pound of weight change, the knee and hip joints see a 4 to 5 fold change in pressure.  Given these options, the patient will proceed with diet and low impact exercise.  -Depending on how she does with the ultrasound-guided hip injection patient may need further evaluation of her lumbar spine/SI region by orthospine/neurosurgery or pain management for possible injections.    -Follow up 4 weeks after ultrasound-guided injection.  -Questions and concerns answered.      Medical Decision Making  Management Options : over-the-counter medicine  Data/Risk: radiology tests      Jackelyn Salguero PA-C  05/08/25  21:46 EDT               EMR Dragon/Transcription disclaimer:  Much of this encounter note is an electronic transcription of spoken language to printed text. Electronic transcription of spoken language may permit erroneous, or  at times, nonsensical words or phrases to be inadvertently transcribed. Although I have reviewed the note for such errors, some may still exist.

## 2025-05-12 ENCOUNTER — CLINICAL SUPPORT (OUTPATIENT)
Age: 70
End: 2025-05-12
Payer: MEDICARE

## 2025-05-12 DIAGNOSIS — M16.12 PRIMARY OSTEOARTHRITIS OF LEFT HIP: ICD-10-CM

## 2025-05-12 DIAGNOSIS — M25.552 LEFT HIP PAIN: Primary | ICD-10-CM

## 2025-05-12 PROCEDURE — 20611 DRAIN/INJ JOINT/BURSA W/US: CPT | Performed by: STUDENT IN AN ORGANIZED HEALTH CARE EDUCATION/TRAINING PROGRAM

## 2025-05-12 RX ORDER — IPRATROPIUM BROMIDE 42 UG/1
SPRAY, METERED NASAL
Qty: 15 ML | Refills: 3 | Status: SHIPPED | OUTPATIENT
Start: 2025-05-12

## 2025-05-12 RX ORDER — LIDOCAINE HYDROCHLORIDE 10 MG/ML
2 INJECTION, SOLUTION EPIDURAL; INFILTRATION; INTRACAUDAL; PERINEURAL
Status: COMPLETED | OUTPATIENT
Start: 2025-05-12 | End: 2025-05-12

## 2025-05-12 RX ORDER — TRIAMCINOLONE ACETONIDE 40 MG/ML
80 INJECTION, SUSPENSION INTRA-ARTICULAR; INTRAMUSCULAR
Status: COMPLETED | OUTPATIENT
Start: 2025-05-12 | End: 2025-05-12

## 2025-05-12 RX ORDER — BUPIVACAINE HYDROCHLORIDE 5 MG/ML
2 INJECTION, SOLUTION EPIDURAL; INTRACAUDAL; PERINEURAL
Status: COMPLETED | OUTPATIENT
Start: 2025-05-12 | End: 2025-05-12

## 2025-05-12 RX ADMIN — LIDOCAINE HYDROCHLORIDE 2 ML: 10 INJECTION, SOLUTION EPIDURAL; INFILTRATION; INTRACAUDAL; PERINEURAL at 09:20

## 2025-05-12 RX ADMIN — BUPIVACAINE HYDROCHLORIDE 2 ML: 5 INJECTION, SOLUTION EPIDURAL; INTRACAUDAL; PERINEURAL at 09:20

## 2025-05-12 RX ADMIN — TRIAMCINOLONE ACETONIDE 80 MG: 40 INJECTION, SUSPENSION INTRA-ARTICULAR; INTRAMUSCULAR at 09:20

## 2025-05-12 NOTE — PROGRESS NOTES
Procedure   - Large Joint Arthrocentesis: L hip joint on 5/12/2025 9:20 AM  Indications: pain  Details: 21 G needle, ultrasound-guided  Medications: 80 mg triamcinolone acetonide 40 MG/ML; 2 mL lidocaine PF 1% 1 %; 2 mL bupivacaine (PF) 0.5 %  Outcome: tolerated well, no immediate complications  Procedure, treatment alternatives, risks and benefits explained, specific risks discussed. Consent was given by the patient. Immediately prior to procedure a time out was called to verify the correct patient, procedure, equipment, support staff and site/side marked as required. Patient was prepped and draped in the usual sterile fashion.            69-year-old female presents with left hip pain from left hip osteoarthritis.  Patient is a referral from [facility] for ultrasound-guided left hip joint corticosteroid injection.  Previous office documentation and images were personally reviewed prior to the visit.  We discussed them in detail how they correlate to experienced symptoms.  I explained the procedure in detail.  I answered all questions to the best my ability.  Risks and benefits as well as post procedure instructions were provided.  Patient elected to proceed and tolerated this procedure well.  See procedure note.  Follow-up with me will be on an as-needed basis.

## 2025-05-13 ENCOUNTER — TREATMENT (OUTPATIENT)
Dept: PHYSICAL THERAPY | Facility: CLINIC | Age: 70
End: 2025-05-13
Payer: MEDICARE

## 2025-05-13 DIAGNOSIS — R05.2 SUBACUTE COUGH: ICD-10-CM

## 2025-05-13 DIAGNOSIS — J45.41 MODERATE PERSISTENT ASTHMA WITH EXACERBATION: ICD-10-CM

## 2025-05-13 DIAGNOSIS — M25.552 PAIN OF LEFT HIP: Primary | ICD-10-CM

## 2025-05-13 PROCEDURE — 97140 MANUAL THERAPY 1/> REGIONS: CPT | Performed by: PHYSICAL THERAPIST

## 2025-05-13 PROCEDURE — 97112 NEUROMUSCULAR REEDUCATION: CPT | Performed by: PHYSICAL THERAPIST

## 2025-05-13 RX ORDER — CYCLOBENZAPRINE HCL 10 MG
10 TABLET ORAL 3 TIMES DAILY PRN
Qty: 20 TABLET | Refills: 0 | Status: SHIPPED | OUTPATIENT
Start: 2025-05-13 | End: 2025-05-21

## 2025-05-13 NOTE — TELEPHONE ENCOUNTER
Rx Refill Note  Requested Prescriptions     Pending Prescriptions Disp Refills    acetaminophen-codeine (TYLENOL with CODEINE #3) 300-30 MG per tablet [Pharmacy Med Name: Acetaminophen-Codeine 300-30 MG Oral Tablet (TYLENOL with CODEINE #3)] 15 tablet 1     Sig: Take 1 tablet by mouth Every 4 (Four) Hours As Needed for Moderate Pain.     Signed Prescriptions Disp Refills    cyclobenzaprine (FLEXERIL) 10 MG tablet 20 tablet 0     Sig: Take 1 tablet by mouth 3 (Three) Times a Day As Needed for Muscle Spasms.     Authorizing Provider: DEBI STANLEY     Ordering User: NÉSTOR BEAL      Last office visit with prescribing clinician: 5/8/2025   Last telemedicine visit with prescribing clinician: Visit date not found   Next office visit with prescribing clinician: 8/4/2025                         Would you like a call back once the refill request has been completed: [] Yes [] No    If the office needs to give you a call back, can they leave a voicemail: [] Yes [] No    Néstor Beal MA  05/13/25, 12:55 EDT

## 2025-05-13 NOTE — PROGRESS NOTES
Physical Therapy Daily Treatment Note      Visit #: 3    Raquel Mariee reports that she is having much less pain since getting a shot in the anterior hip. Pt reports that she is having less pain and moving better and was surprised how much relief she has gotten. Pt reports that there HEP has become more comfortable.         Objective Pt present to PT today with no distress at rest.     Pt with no increased pain in the clinic with activities today.     Pt with continued tenderness to palpation of the ITB and adductors in the L LE.         See Exercise, Manual, and Modality Logs for complete treatment.     Assessment/Plan  Pt continues to have pain, weakness, and decreased function of the L hip extending into the low back. Pt to continue with PT to help improve symptoms mentioned prior and to return to pain free daily function.       Progress per Plan of Care      Visit Diagnosis:    ICD-10-CM ICD-9-CM   1. Pain of left hip  M25.552 719.45              Timed Treatment:  Manual Therapy:    15     mins  58305;  Therapeutic Exercise:         mins  24967;     Neuromuscular Laurie:    10    mins  54502;    Therapeutic Activity:          mins  92065;     Gait Training:           mins  63630;     Ultrasound:          mins  44323;    Electrical Stimulation:         mins  92972 ( );  Dry Needling          mins self-pay  Iontophoresis          mins [id number]    Untimed Treatments:  Electrical Stimulation:         mins  12489 ( );  Dry Needling:                     mins  Ultrasound:                         mins  34154;        Timed Treatment:   25   mins   Total Treatment:     45   mins    Garry Miguel, PT  Physical Therapist

## 2025-05-15 ENCOUNTER — TRANSCRIBE ORDERS (OUTPATIENT)
Dept: ADMINISTRATIVE | Facility: HOSPITAL | Age: 70
End: 2025-05-15
Payer: MEDICARE

## 2025-05-15 DIAGNOSIS — M54.16 RADICULOPATHY, LUMBAR REGION: Primary | ICD-10-CM

## 2025-05-21 ENCOUNTER — OFFICE VISIT (OUTPATIENT)
Dept: INTERNAL MEDICINE | Facility: CLINIC | Age: 70
End: 2025-05-21
Payer: MEDICARE

## 2025-05-21 VITALS
SYSTOLIC BLOOD PRESSURE: 124 MMHG | WEIGHT: 200 LBS | HEIGHT: 64 IN | RESPIRATION RATE: 16 BRPM | DIASTOLIC BLOOD PRESSURE: 70 MMHG | HEART RATE: 66 BPM | OXYGEN SATURATION: 98 % | TEMPERATURE: 98.1 F | BODY MASS INDEX: 34.15 KG/M2

## 2025-05-21 DIAGNOSIS — E11.9 TYPE 2 DIABETES MELLITUS WITHOUT COMPLICATION, WITH LONG-TERM CURRENT USE OF INSULIN: ICD-10-CM

## 2025-05-21 DIAGNOSIS — M25.552 LEFT HIP PAIN: Primary | ICD-10-CM

## 2025-05-21 DIAGNOSIS — Z79.4 TYPE 2 DIABETES MELLITUS WITHOUT COMPLICATION, WITH LONG-TERM CURRENT USE OF INSULIN: ICD-10-CM

## 2025-05-21 DIAGNOSIS — J32.0 MAXILLARY SINUSITIS, UNSPECIFIED CHRONICITY: ICD-10-CM

## 2025-05-21 DIAGNOSIS — Z00.00 ROUTINE GENERAL MEDICAL EXAMINATION AT A HEALTH CARE FACILITY: ICD-10-CM

## 2025-05-21 RX ORDER — ACETAMINOPHEN AND CODEINE PHOSPHATE 300; 30 MG/1; MG/1
1 TABLET ORAL EVERY 4 HOURS PRN
Qty: 15 TABLET | Refills: 1 | OUTPATIENT
Start: 2025-05-21

## 2025-05-21 RX ORDER — ACETAMINOPHEN AND CODEINE PHOSPHATE 300; 30 MG/1; MG/1
1 TABLET ORAL EVERY 4 HOURS PRN
Qty: 15 TABLET | Refills: 1 | Status: SHIPPED | OUTPATIENT
Start: 2025-05-21

## 2025-05-21 NOTE — PROGRESS NOTES
Subjective     Patient ID: Raquel Mariee is a 69 y.o. female. Patient is here for management of multiple medical problems.     Chief Complaint   Patient presents with    Leg Pain     Left leg      History of Present Illness   Left leg pain and   The following portions of the patient's history were reviewed and updated as appropriate: allergies, current medications, past family history, past medical history, past social history, past surgical history and problem list.    Seen ptc.         Review of Systems    Current Outpatient Medications:     acetaminophen-codeine (TYLENOL with CODEINE #3) 300-30 MG per tablet, Take 1 tablet by mouth Every 4 (Four) Hours As Needed for Moderate Pain., Disp: 15 tablet, Rfl: 1    albuterol sulfate HFA (Ventolin HFA) 108 (90 Base) MCG/ACT inhaler, Inhale 2 puffs Every 4 (Four) to 6 (Six) Hours As Needed for cough, wheeze, and shortness of breath, Disp: 8.5 g, Rfl: 3    albuterol sulfate  (90 Base) MCG/ACT inhaler, Inhale 2 puffs by mouth every 4 to 6 hours as needed for cough, wheeze, shortness of breath. Use with vortex spacer, Disp: 8.5 g, Rfl: 3    allopurinol (ZYLOPRIM) 100 MG tablet, Take 1 tablet by mouth Daily., Disp: 90 tablet, Rfl: 3    betamethasone valerate (VALISONE) 0.1 % cream, Apply topically to the appropriate area as directed Daily. (Patient taking differently: Apply  topically to the appropriate area as directed Daily As Needed.), Disp: 45 g, Rfl: 11    Budeson-Glycopyrrol-Formoterol (BREZTRI) 160-9-4.8 MCG/ACT aerosol inhaler, Inhale 2 puffs by mouth 2 (Two) Times a Day. Rinse out mouth after use, Disp: 32.1 g, Rfl: 3    buPROPion SR (WELLBUTRIN SR) 150 MG 12 hr tablet, Take 1 tablet by mouth 2 (Two) Times a Day., Disp: 180 tablet, Rfl: 3    calcium carbonate (Antacid Maximum) 1000 MG chewable tablet, Chew 500 mg 3 (Three) Times a Day., Disp: , Rfl:     cholecalciferol (VITAMIN D3) 25 MCG (1000 UT) tablet, Take 1 tablet by mouth Daily., Disp: , Rfl:      denosumab (PROLIA) 60 MG/ML solution prefilled syringe syringe, Inject 1 mL under the skin into the appropriate area as directed Every 6 (Six) Months., Disp: , Rfl:     diphenhydrAMINE (Benadryl Allergy) 25 MG tablet, Take 1-2 tablets by mouth Every 4-6 Hours As Needed., Disp: 90 tablet, Rfl: 11    doxycycline (VIBRAMYCIN) 100 MG capsule, Take 1 capsule by mouth 2 (Two) Times a Day., Disp: 20 capsule, Rfl: 0    DULoxetine (Cymbalta) 60 MG capsule, Take 1 capsule by mouth Daily., Disp: 90 capsule, Rfl: 3    estradiol (ESTRACE) 0.1 MG/GM vaginal cream, Insert 0.5 grams vaginally twice weekly, Disp: 42.5 g, Rfl: 5    ferrous sulfate 325 (65 FE) MG EC tablet, Take 2 tablets by mouth Daily With Breakfast., Disp: , Rfl:     fexofenadine (ALLEGRA) 180 MG tablet, Take 1 tablet by mouth Daily., Disp: , Rfl:     Fluticasone Propionate (Xhance) 93 MCG/ACT Exhaler Suspension, Instill 1 spray in both nostrils twice a day, Disp: 16 mL, Rfl: 11    Fluticasone Propionate (Xhance) 93 MCG/ACT Exhaler Suspension, Administer 1 spray into the nostril(s) as directed by provider 2 (Two) Times a Day., Disp: 16 mL, Rfl: 11    glucosamine-chondroitin 500-400 MG capsule capsule, Take 1 capsule by mouth 2 (Two) Times a Day With Meals., Disp: , Rfl:     guaiFENesin-codeine (ROMILAR-AC) 100-10 MG/5ML solution/syrup, Take 5 mL by mouth 3 (Three) Times a Day As Needed for Cough or Congestion., Disp: 90 mL, Rfl: 0    Hydrocortisone, Perianal, (Anusol-HC) 2.5 % rectal cream, Apply rectally 2 times daily, Disp: 30 g, Rfl: 2    Hypertonic Nasal Wash (Sinus Rinse Kit) pack, use once or twice daily as needed, Disp: 1 each, Rfl: 3    immune globulin, human, (Gammagard) 20 GM/200ML solution infusion, Infuse 40 g into a venous catheter Every 28 (Twenty-Eight) Days., Disp: 400 mL, Rfl: 0    immune globulin, human, (Gammagard) 30 GM/300ML solution infusion, 40 grams IV every 4 weeks, Disp: 40 mL, Rfl: 11    immune globulin, human, (Gammagard) 30 GM/300ML  solution infusion, 50 grams IV every 4 weeks, Disp: 300 mL, Rfl: 6    ipratropium (ATROVENT) 0.06 % nasal spray, Instill 1-2 sprays in each nostril 2-3 times daily as needed, Disp: 15 mL, Rfl: 3    ipratropium (ATROVENT) 0.06 % nasal spray, Instill 1-2 sprays each nostril 2-3 times daily as needed., Disp: 15 mL, Rfl: 3    ipratropium-albuterol (DUO-NEB) 0.5-2.5 mg/3 ml nebulizer, Inhale the contents of 1 vial through a nebulizer every 4-6 hours as needed for cough,wheezing and shortness of breath., Disp: 90 mL, Rfl: 3    isosorbide mononitrate (IMDUR) 30 MG 24 hr tablet, Take 1 tablet by mouth Every Morning., Disp: 90 tablet, Rfl: 0    levoFLOXacin (LEVAQUIN) 750 MG tablet, Take 1 tablet by mouth Daily., Disp: 10 tablet, Rfl: 0    levothyroxine (SYNTHROID, LEVOTHROID) 50 MCG tablet, Take 1 tablet by mouth Daily., Disp: 90 tablet, Rfl: 3    lidocaine (XYLOCAINE) 5 % ointment, Apply 1 Application topically to the appropriate area as directed Every 2 (Two) Hours As Needed for Mild Pain., Disp: 50 g, Rfl: 0    linaclotide (Linzess) 145 MCG capsule capsule, Take 1 capsule by mouth Every Morning Before Breakfast., Disp: 30 capsule, Rfl: 11    Magnesium 250 MG tablet, Take 2 tablets by mouth 2 (Two) Times a Day., Disp: , Rfl:     Mepolizumab (Nucala) 100 MG/ML solution auto-injector, Inject 1 mL under the skin into the appropriate area as directed Every 28 (Twenty-Eight) Days. Obtaining medication from [place] to [facility], Disp: , Rfl:     Misc. Devices (Sitz Bath) misc, Use 1 each As Needed (for hemorrhoids)., Disp: 1 each, Rfl: 0    montelukast (SINGULAIR) 10 MG tablet, Take 1 tablet by mouth every night at bedtime., Disp: 90 tablet, Rfl: 3    montelukast (SINGULAIR) 10 MG tablet, Take 1 tablet by mouth every night at bedtime., Disp: 30 tablet, Rfl: 11    multivitamin with minerals tablet tablet, Take 1 tablet by mouth Daily., Disp: , Rfl:     omeprazole (priLOSEC) 40 MG capsule, Take 1 capsule by mouth 2 (Two) Times a  "Day., Disp: 180 capsule, Rfl: 3    ondansetron (ZOFRAN) 4 MG tablet, Take 1 tablet by mouth Every 8 (Eight) Hours As Needed for Nausea or Vomiting., Disp: 30 tablet, Rfl: 11    potassium citrate (UROCIT-K) 10 MEQ (1080 MG) CR tablet, Take 1 tablet by mouth Daily., Disp: 90 tablet, Rfl: 3    predniSONE (DELTASONE) 20 MG tablet, Take 2 tablets prior to Gammgard infusion, Disp: 6 tablet, Rfl: 3    predniSONE (DELTASONE) 20 MG tablet, Take 2 tablets by mouth Daily., Disp: 10 tablet, Rfl: 0    solifenacin (VESIcare) 5 MG tablet, Take 1 tablet by mouth Daily., Disp: 30 tablet, Rfl: 3    tiZANidine (ZANAFLEX) 4 MG tablet, Take 1 tablet by mouth 2 (Two) Times a Day As Needed for Muscle Spasms., Disp: 100 tablet, Rfl: 3    ubrogepant (Ubrelvy) 100 MG tablet, Take 1 tablet by mouth 1 (One) Time As Needed (migraine)., Disp: 5 tablet, Rfl: 0    valsartan (DIOVAN) 160 MG tablet, Take 1 tablet by mouth Daily., Disp: 90 tablet, Rfl: 3    vitamin B-12 (CYANOCOBALAMIN) 1000 MCG tablet, Take 1 tablet by mouth Daily., Disp: , Rfl:     Objective      Blood pressure 124/70, pulse 66, temperature 98.1 °F (36.7 °C), resp. rate 16, height 162.6 cm (64\"), weight 90.7 kg (200 lb), SpO2 98%, not currently breastfeeding.            Physical Exam     General Appearance:    Alert, cooperative, no distress, appears stated age   Head:    Normocephalic, without obvious abnormality, atraumatic   Eyes:    PERRL, conjunctiva/corneas clear, EOM's intact   Ears:    Normal TM's and external ear canals, both ears   Nose:   Nares normal, septum midline, mucosa normal, no drainage   or sinus tenderness   Throat:   Lips, mucosa, and tongue normal; teeth and gums normal   Neck:   Supple, symmetrical, trachea midline, no adenopathy;        thyroid:  No enlargement/tenderness/nodules; no carotid    bruit or JVD   Back:     Symmetric, no curvature, ROM normal, no CVA tenderness   Lungs:     Clear to auscultation bilaterally, respirations unlabored   Chest wall:  "   No tenderness or deformity   Heart:    Regular rate and rhythm, S1 and S2 normal, no murmur,        rub or gallop   Abdomen:     Soft, non-tender, bowel sounds active all four quadrants,     no masses, no organomegaly   Extremities:   Extremities normal, atraumatic, no cyanosis or edema   Pulses:   2+ and symmetric all extremities   Skin:   Skin color, texture, turgor normal, no rashes or lesions   Lymph nodes:   Cervical, supraclavicular, and axillary nodes normal   Neurologic:   CNII-XII intact. Normal strength, sensation and reflexes       throughout      Results for orders placed or performed during the hospital encounter of 04/04/25   Comprehensive Metabolic Panel    Collection Time: 04/04/25  9:37 AM    Specimen: Blood   Result Value Ref Range    Glucose 136 (H) 65 - 99 mg/dL    BUN 17 8 - 23 mg/dL    Creatinine 1.28 (H) 0.57 - 1.00 mg/dL    Sodium 136 136 - 145 mmol/L    Potassium 4.1 3.5 - 5.2 mmol/L    Chloride 100 98 - 107 mmol/L    CO2 24.4 22.0 - 29.0 mmol/L    Calcium 9.5 8.6 - 10.5 mg/dL    Total Protein 7.1 6.0 - 8.5 g/dL    Albumin 4.3 3.5 - 5.2 g/dL    ALT (SGPT) 20 1 - 33 U/L    AST (SGOT) 28 1 - 32 U/L    Alkaline Phosphatase 61 39 - 117 U/L    Total Bilirubin 0.6 0.0 - 1.2 mg/dL    Globulin 2.8 gm/dL    A/G Ratio 1.5 g/dL    BUN/Creatinine Ratio 13.3 7.0 - 25.0    Anion Gap 11.6 5.0 - 15.0 mmol/L    eGFR 45.4 (L) >60.0 mL/min/1.73   IgG    Collection Time: 04/04/25  9:37 AM    Specimen: Blood   Result Value Ref Range    IgG 1,025 700 - 1,600 mg/dL   CBC Auto Differential    Collection Time: 04/04/25  9:37 AM    Specimen: Blood   Result Value Ref Range    WBC 9.10 3.40 - 10.80 10*3/mm3    RBC 4.39 3.77 - 5.28 10*6/mm3    Hemoglobin 14.0 12.0 - 15.9 g/dL    Hematocrit 40.8 34.0 - 46.6 %    MCV 92.9 79.0 - 97.0 fL    MCH 31.9 26.6 - 33.0 pg    MCHC 34.3 31.5 - 35.7 g/dL    RDW 13.5 12.3 - 15.4 %    RDW-SD 45.8 37.0 - 54.0 fl    MPV 9.2 6.0 - 12.0 fL    Platelets 286 140 - 450 10*3/mm3     Neutrophil % 76.0 42.7 - 76.0 %    Lymphocyte % 15.2 (L) 19.6 - 45.3 %    Monocyte % 6.3 5.0 - 12.0 %    Eosinophil % 0.5 0.3 - 6.2 %    Basophil % 1.0 0.0 - 1.5 %    Immature Grans % 1.0 (H) 0.0 - 0.5 %    Neutrophils, Absolute 6.92 1.70 - 7.00 10*3/mm3    Lymphocytes, Absolute 1.38 0.70 - 3.10 10*3/mm3    Monocytes, Absolute 0.57 0.10 - 0.90 10*3/mm3    Eosinophils, Absolute 0.05 0.00 - 0.40 10*3/mm3    Basophils, Absolute 0.09 0.00 - 0.20 10*3/mm3    Immature Grans, Absolute 0.09 (H) 0.00 - 0.05 10*3/mm3    nRBC 0.0 0.0 - 0.2 /100 WBC     *Note: Due to a large number of results and/or encounters for the requested time period, some results have not been displayed. A complete set of results can be found in Results Review.         Assessment & Plan   Left hip pain. Just inj.         Diagnoses and all orders for this visit:    1. Left hip pain (Primary)    2. Maxillary sinusitis, unspecified chronicity  -     ubrogepant (Ubrelvy) 100 MG tablet; Take 1 tablet by mouth 1 (One) Time As Needed (migraine).  Dispense: 5 tablet; Refill: 0    3. Type 2 diabetes mellitus without complication, with long-term current use of insulin  -     ubrogepant (Ubrelvy) 100 MG tablet; Take 1 tablet by mouth 1 (One) Time As Needed (migraine).  Dispense: 5 tablet; Refill: 0    4. Routine general medical examination at a health care facility  -     ubrogepant (Ubrelvy) 100 MG tablet; Take 1 tablet by mouth 1 (One) Time As Needed (migraine).  Dispense: 5 tablet; Refill: 0    5. Moderate persistent asthma with exacerbation  -     acetaminophen-codeine (TYLENOL with CODEINE #3) 300-30 MG per tablet; Take 1 tablet by mouth Every 4 (Four) Hours As Needed for Moderate Pain.  Dispense: 15 tablet; Refill: 1    6. Subacute cough  -     acetaminophen-codeine (TYLENOL with CODEINE #3) 300-30 MG per tablet; Take 1 tablet by mouth Every 4 (Four) Hours As Needed for Moderate Pain.  Dispense: 15 tablet; Refill: 1      No follow-ups on file.          There  are no Patient Instructions on file for this visit.     Sanjeev Rawls MD    Assessment & Plan

## 2025-05-22 ENCOUNTER — TREATMENT (OUTPATIENT)
Dept: PHYSICAL THERAPY | Facility: CLINIC | Age: 70
End: 2025-05-22
Payer: MEDICARE

## 2025-05-22 DIAGNOSIS — M25.552 PAIN OF LEFT HIP: Primary | ICD-10-CM

## 2025-05-22 PROCEDURE — 97110 THERAPEUTIC EXERCISES: CPT | Performed by: PHYSICAL THERAPIST

## 2025-05-22 PROCEDURE — 97140 MANUAL THERAPY 1/> REGIONS: CPT | Performed by: PHYSICAL THERAPIST

## 2025-05-22 NOTE — PROGRESS NOTES
Physical Therapy Daily Treatment Note      Visit #: 4    Raquel Mariee reports 3-4/10 pain today at rest.  Pt reports that her L hip is feeling better although is still having pain. Pt reports that she got some Tylenol 3 from her pain doctor and got an injection in her low back which may be helping her hip inadvertently. Pt reports that her L knee has felt unstable and is clicking although the clicking is not painful.         Objective Pt present to PT today with no distress at rest.     Pt with no increased pain in the L hip with activities in the clinic today.     Pt with decreased irritability in the hip muscles noted with manual activities today.       See Exercise, Manual, and Modality Logs for complete treatment.     Assessment/Plan  Pt continues to show improvement in pain in the L hip with improved mobility and strength. Pt to continue with HEP and activities in the clinic as tolerated to improve pain free function and activity tolerance with daily tasks.       Progress per Plan of Care      Visit Diagnosis:    ICD-10-CM ICD-9-CM   1. Pain of left hip  M25.552 719.45              Timed Treatment:  Manual Therapy:    12     mins  53320;  Therapeutic Exercise:    11     mins  88490;     Neuromuscular Laurie:        mins  94180;    Therapeutic Activity:          mins  38851;     Gait Training:           mins  12922;     Ultrasound:          mins  44460;    Electrical Stimulation:         mins  78423 ( );  Dry Needling          mins self-pay  Iontophoresis          mins 91740    Untimed Treatments:  Electrical Stimulation:         mins  02638 (MC );  Dry Needling:                     mins  Ultrasound:                         mins  56986;        Timed Treatment:   23   mins   Total Treatment:     40   mins    Garry Miguel, PT  Physical Therapist

## 2025-05-27 ENCOUNTER — TREATMENT (OUTPATIENT)
Dept: PHYSICAL THERAPY | Facility: CLINIC | Age: 70
End: 2025-05-27
Payer: MEDICARE

## 2025-05-27 DIAGNOSIS — M25.552 PAIN OF LEFT HIP: Primary | ICD-10-CM

## 2025-05-27 PROCEDURE — 97140 MANUAL THERAPY 1/> REGIONS: CPT | Performed by: PHYSICAL THERAPIST

## 2025-05-27 PROCEDURE — 97110 THERAPEUTIC EXERCISES: CPT | Performed by: PHYSICAL THERAPIST

## 2025-05-27 PROCEDURE — 97112 NEUROMUSCULAR REEDUCATION: CPT | Performed by: PHYSICAL THERAPIST

## 2025-05-27 NOTE — PROGRESS NOTES
Physical Therapy Daily Treatment Note      Visit #: 5    Raquel Mariee reports 4-5/10 pain today at rest.  Pt reports that she felt like she was almost walking normal on Saturday without her cane. Pt reports that she stood on hard, concrete ground at [employer] which seemed to make the L hip worse. Pt reports that the outside of her hip feels better although the L anterior hip and knee pain remain.         Objective Pt present to PT today with no distress at rest.     Pt with no limitation in L hip mobility noted with PROM.     Pt with no increased pain in the L hip with activities in the clinic today.       See Exercise, Manual, and Modality Logs for complete treatment.     Assessment/Plan  Pt continues to have pain with WB activities and ambulation although is improving pain and function. Pt to continue with activities in the clinic and at home to improve L hip strength, pain, and activity tolerance.       Progress per Plan of Care      Visit Diagnosis:    ICD-10-CM ICD-9-CM   1. Pain of left hip  M25.552 719.45              Timed Treatment:  Manual Therapy:    10     mins  92598;  Therapeutic Exercise:    20     mins  23275;     Neuromuscular Laurie:    10    mins  85385;    Therapeutic Activity:          mins  33412;     Gait Training:           mins  49652;     Ultrasound:          mins  53621;    Electrical Stimulation:         mins  78739 ( );  Dry Needling          mins self-pay  Iontophoresis          mins 92984    Untimed Treatments:  Electrical Stimulation:         mins  47261 ( );  Dry Needling:                     mins  Ultrasound:                         mins  72508;        Timed Treatment:   40   mins   Total Treatment:     40   mins    Garry Miguel, PT  Physical Therapist

## 2025-05-29 ENCOUNTER — TREATMENT (OUTPATIENT)
Dept: PHYSICAL THERAPY | Facility: CLINIC | Age: 70
End: 2025-05-29
Payer: MEDICARE

## 2025-05-29 DIAGNOSIS — M25.552 PAIN OF LEFT HIP: Primary | ICD-10-CM

## 2025-05-29 PROCEDURE — 97112 NEUROMUSCULAR REEDUCATION: CPT | Performed by: PHYSICAL THERAPIST

## 2025-05-29 PROCEDURE — 97140 MANUAL THERAPY 1/> REGIONS: CPT | Performed by: PHYSICAL THERAPIST

## 2025-05-29 NOTE — PROGRESS NOTES
Physical Therapy Daily Treatment Note      Visit #: 6    Raquel Mariee reports 4/10 pain today at rest.  Pt reports that she has been helping her daughter clean her home for the last 2 days and has not been able to do her HEP. Pt states that she is very sore today although is surprised that she was able to do as much and is continuing to be able to walk better than 2 weeks ago.         Objective Pt present to PT today with no distress at rest.     Pt with no increased pain with activities performed in the clinic today.       See Exercise, Manual, and Modality Logs for complete treatment.     Assessment/Plan  Pt continues to have good motion in the L hip although continues to have anterior hip pain and L knee pain. Pt to continue with PT to help improve strength, stability, and pain free function.       Progress per Plan of Care      Visit Diagnosis:    ICD-10-CM ICD-9-CM   1. Pain of left hip  M25.552 719.45              Timed Treatment:  Manual Therapy:    15     mins  87958;  Therapeutic Exercise:         mins  86716;     Neuromuscular Laurie:    8    mins  79463;    Therapeutic Activity:          mins  60323;     Gait Training:           mins  95275;     Ultrasound:          mins  35291;    Electrical Stimulation:         mins  82167 ( );  Dry Needling          mins self-pay  Iontophoresis          mins 74812    Untimed Treatments:  Electrical Stimulation:         mins  62746 ( );  Dry Needling:                     mins  Ultrasound:                         mins  26722;        Timed Treatment:   23   mins   Total Treatment:     60   mins    Garry Miguel, PT  Physical Therapist

## 2025-05-30 ENCOUNTER — HOSPITAL ENCOUNTER (OUTPATIENT)
Facility: HOSPITAL | Age: 70
Discharge: HOME | End: 2025-05-30
Payer: MEDICARE

## 2025-05-30 VITALS
DIASTOLIC BLOOD PRESSURE: 85 MMHG | HEART RATE: 94 BPM | OXYGEN SATURATION: 95 % | SYSTOLIC BLOOD PRESSURE: 131 MMHG | TEMPERATURE: 98.4 F | WEIGHT: 207 LBS | RESPIRATION RATE: 18 BRPM | BODY MASS INDEX: 35.53 KG/M2

## 2025-05-30 DIAGNOSIS — D80.1 COMMON VARIABLE AGAMMAGLOBULINEMIA: Primary | ICD-10-CM

## 2025-05-30 DIAGNOSIS — Z95.828 PORT-A-CATH IN PLACE: ICD-10-CM

## 2025-05-30 LAB
ALBUMIN SERPL-MCNC: 4.1 G/DL (ref 3.5–5.2)
ALBUMIN/GLOB SERPL: 1.6 G/DL
ALP SERPL-CCNC: 48 U/L (ref 39–117)
ALT SERPL W P-5'-P-CCNC: 21 U/L (ref 1–33)
ANION GAP SERPL CALCULATED.3IONS-SCNC: 11.5 MMOL/L (ref 5–15)
AST SERPL-CCNC: 34 U/L (ref 1–32)
BASOPHILS # BLD AUTO: 0.04 10*3/MM3 (ref 0–0.2)
BASOPHILS NFR BLD AUTO: 0.8 % (ref 0–1.5)
BILIRUB SERPL-MCNC: 0.4 MG/DL (ref 0–1.2)
BUN SERPL-MCNC: 19 MG/DL (ref 8–23)
BUN/CREAT SERPL: 16.4 (ref 7–25)
CALCIUM SPEC-SCNC: 9.2 MG/DL (ref 8.6–10.5)
CHLORIDE SERPL-SCNC: 98 MMOL/L (ref 98–107)
CO2 SERPL-SCNC: 23.5 MMOL/L (ref 22–29)
CREAT SERPL-MCNC: 1.16 MG/DL (ref 0.57–1)
DEPRECATED RDW RBC AUTO: 45.9 FL (ref 37–54)
EGFRCR SERPLBLD CKD-EPI 2021: 51.1 ML/MIN/1.73
EOSINOPHIL # BLD AUTO: 0.07 10*3/MM3 (ref 0–0.4)
EOSINOPHIL NFR BLD AUTO: 1.3 % (ref 0.3–6.2)
ERYTHROCYTE [DISTWIDTH] IN BLOOD BY AUTOMATED COUNT: 13.2 % (ref 12.3–15.4)
GLOBULIN UR ELPH-MCNC: 2.6 GM/DL
GLUCOSE SERPL-MCNC: 134 MG/DL (ref 65–99)
HCT VFR BLD AUTO: 33.6 % (ref 34–46.6)
HGB BLD-MCNC: 11.4 G/DL (ref 12–15.9)
IGG1 SER-MCNC: 1085 MG/DL (ref 700–1600)
IMM GRANULOCYTES # BLD AUTO: 0.03 10*3/MM3 (ref 0–0.05)
IMM GRANULOCYTES NFR BLD AUTO: 0.6 % (ref 0–0.5)
LYMPHOCYTES # BLD AUTO: 1.36 10*3/MM3 (ref 0.7–3.1)
LYMPHOCYTES NFR BLD AUTO: 26.1 % (ref 19.6–45.3)
MCH RBC QN AUTO: 32.2 PG (ref 26.6–33)
MCHC RBC AUTO-ENTMCNC: 33.9 G/DL (ref 31.5–35.7)
MCV RBC AUTO: 94.9 FL (ref 79–97)
MONOCYTES # BLD AUTO: 0.46 10*3/MM3 (ref 0.1–0.9)
MONOCYTES NFR BLD AUTO: 8.8 % (ref 5–12)
NEUTROPHILS NFR BLD AUTO: 3.26 10*3/MM3 (ref 1.7–7)
NEUTROPHILS NFR BLD AUTO: 62.4 % (ref 42.7–76)
NRBC BLD AUTO-RTO: 0 /100 WBC (ref 0–0.2)
PLATELET # BLD AUTO: 251 10*3/MM3 (ref 140–450)
PMV BLD AUTO: 8.8 FL (ref 6–12)
POTASSIUM SERPL-SCNC: 4.2 MMOL/L (ref 3.5–5.2)
PROT SERPL-MCNC: 6.7 G/DL (ref 6–8.5)
RBC # BLD AUTO: 3.54 10*6/MM3 (ref 3.77–5.28)
SODIUM SERPL-SCNC: 133 MMOL/L (ref 136–145)
WBC NRBC COR # BLD AUTO: 5.22 10*3/MM3 (ref 3.4–10.8)

## 2025-05-30 PROCEDURE — 96366 THER/PROPH/DIAG IV INF ADDON: CPT

## 2025-05-30 PROCEDURE — 25010000002 HEPARIN LOCK FLUSH PER 10 UNITS: Performed by: ALLERGY & IMMUNOLOGY

## 2025-05-30 PROCEDURE — 80053 COMPREHEN METABOLIC PANEL: CPT | Performed by: ALLERGY & IMMUNOLOGY

## 2025-05-30 PROCEDURE — 25010000002 IMMUNE GLOBULIN (HUMAN) 20 GM/200ML SOLUTION: Performed by: ALLERGY & IMMUNOLOGY

## 2025-05-30 PROCEDURE — 96365 THER/PROPH/DIAG IV INF INIT: CPT

## 2025-05-30 PROCEDURE — 25010000002 IMMUNE GLOBULIN (HUMAN) 10 GM/100ML SOLUTION: Performed by: ALLERGY & IMMUNOLOGY

## 2025-05-30 PROCEDURE — 82784 ASSAY IGA/IGD/IGG/IGM EACH: CPT | Performed by: ALLERGY & IMMUNOLOGY

## 2025-05-30 PROCEDURE — 25810000003 SODIUM CHLORIDE 0.9 % SOLUTION: Performed by: ALLERGY & IMMUNOLOGY

## 2025-05-30 PROCEDURE — 85025 COMPLETE CBC W/AUTO DIFF WBC: CPT | Performed by: ALLERGY & IMMUNOLOGY

## 2025-05-30 RX ORDER — METHYLPREDNISOLONE SODIUM SUCCINATE 125 MG/2ML
50 INJECTION, POWDER, LYOPHILIZED, FOR SOLUTION INTRAMUSCULAR; INTRAVENOUS ONCE AS NEEDED
Start: 2025-06-13

## 2025-05-30 RX ORDER — DIPHENHYDRAMINE HCL 25 MG
50 CAPSULE ORAL ONCE AS NEEDED
Start: 2025-06-13

## 2025-05-30 RX ORDER — PREDNISONE 20 MG/1
40 TABLET ORAL ONCE
Start: 2025-06-13 | End: 2025-06-13

## 2025-05-30 RX ORDER — HEPARIN SODIUM (PORCINE) LOCK FLUSH IV SOLN 100 UNIT/ML 100 UNIT/ML
500 SOLUTION INTRAVENOUS AS NEEDED
Status: DISCONTINUED | OUTPATIENT
Start: 2025-05-30 | End: 2025-05-31 | Stop reason: HOSPADM

## 2025-05-30 RX ORDER — DIPHENHYDRAMINE HCL 25 MG
50 CAPSULE ORAL ONCE
Start: 2025-06-13 | End: 2025-06-13

## 2025-05-30 RX ORDER — ACETAMINOPHEN 325 MG/1
325 TABLET ORAL ONCE
OUTPATIENT
Start: 2025-06-13

## 2025-05-30 RX ORDER — PREDNISONE 20 MG/1
40 TABLET ORAL ONCE AS NEEDED
Start: 2025-06-13

## 2025-05-30 RX ORDER — EPINEPHRINE 1 MG/ML
0.3 INJECTION, SOLUTION INTRAMUSCULAR; SUBCUTANEOUS ONCE AS NEEDED
Start: 2025-06-13

## 2025-05-30 RX ADMIN — SODIUM CHLORIDE 500 ML: 9 INJECTION, SOLUTION INTRAVENOUS at 08:55

## 2025-05-30 RX ADMIN — IMMUNE GLOBULIN INFUSION (HUMAN) 10 G: 100 INJECTION, SOLUTION INTRAVENOUS; SUBCUTANEOUS at 09:27

## 2025-05-30 RX ADMIN — IMMUNE GLOBULIN INFUSION (HUMAN) 20 G: 100 INJECTION, SOLUTION INTRAVENOUS; SUBCUTANEOUS at 10:31

## 2025-05-30 RX ADMIN — IMMUNE GLOBULIN INFUSION (HUMAN) 20 G: 100 INJECTION, SOLUTION INTRAVENOUS; SUBCUTANEOUS at 12:01

## 2025-05-30 RX ADMIN — HEPARIN 500 UNITS: 100 SYRINGE at 13:31

## 2025-05-30 NOTE — CODE DOCUMENTATION
IVAD established on first attempt. Treatment provided per tx plan (see EMAR). Pt tolerated tx w/o complications. IVAD is heparin locked and needle d/c'd per protocol, AVS denied during visit d/t pt using mychart. Pt ambulated out of clinic w/o any acute s/sx of distress.

## 2025-05-31 ENCOUNTER — APPOINTMENT (OUTPATIENT)
Dept: GENERAL RADIOLOGY | Facility: HOSPITAL | Age: 70
End: 2025-05-31
Payer: MEDICARE

## 2025-05-31 ENCOUNTER — HOSPITAL ENCOUNTER (EMERGENCY)
Facility: HOSPITAL | Age: 70
Discharge: HOME | End: 2025-06-01
Attending: STUDENT IN AN ORGANIZED HEALTH CARE EDUCATION/TRAINING PROGRAM
Payer: MEDICARE

## 2025-05-31 DIAGNOSIS — J81.0 ACUTE PULMONARY EDEMA: Primary | ICD-10-CM

## 2025-05-31 DIAGNOSIS — R06.02 SHORTNESS OF BREATH: ICD-10-CM

## 2025-05-31 LAB
ALBUMIN SERPL-MCNC: 3.9 G/DL (ref 3.5–5.2)
ALBUMIN/GLOB SERPL: 1.1 G/DL
ALP SERPL-CCNC: 45 U/L (ref 39–117)
ALT SERPL W P-5'-P-CCNC: 22 U/L (ref 1–33)
ANION GAP SERPL CALCULATED.3IONS-SCNC: 12 MMOL/L (ref 5–15)
AST SERPL-CCNC: 34 U/L (ref 1–32)
BASOPHILS # BLD AUTO: 0.05 10*3/MM3 (ref 0–0.2)
BASOPHILS NFR BLD AUTO: 0.8 % (ref 0–1.5)
BILIRUB SERPL-MCNC: 0.4 MG/DL (ref 0–1.2)
BUN SERPL-MCNC: 16 MG/DL (ref 8–23)
BUN/CREAT SERPL: 16.7 (ref 7–25)
CALCIUM SPEC-SCNC: 9.2 MG/DL (ref 8.6–10.5)
CHLORIDE SERPL-SCNC: 97 MMOL/L (ref 98–107)
CO2 SERPL-SCNC: 23 MMOL/L (ref 22–29)
CREAT SERPL-MCNC: 0.96 MG/DL (ref 0.57–1)
DEPRECATED RDW RBC AUTO: 47.3 FL (ref 37–54)
EGFRCR SERPLBLD CKD-EPI 2021: 64.2 ML/MIN/1.73
EOSINOPHIL # BLD AUTO: 0.02 10*3/MM3 (ref 0–0.4)
EOSINOPHIL NFR BLD AUTO: 0.3 % (ref 0.3–6.2)
ERYTHROCYTE [DISTWIDTH] IN BLOOD BY AUTOMATED COUNT: 13.6 % (ref 12.3–15.4)
GLOBULIN UR ELPH-MCNC: 3.4 GM/DL
GLUCOSE SERPL-MCNC: 84 MG/DL (ref 65–99)
HCT VFR BLD AUTO: 32.2 % (ref 34–46.6)
HGB BLD-MCNC: 10.8 G/DL (ref 12–15.9)
HOLD SPECIMEN: NORMAL
HOLD SPECIMEN: NORMAL
IMM GRANULOCYTES # BLD AUTO: 0.01 10*3/MM3 (ref 0–0.05)
IMM GRANULOCYTES NFR BLD AUTO: 0.2 % (ref 0–0.5)
LYMPHOCYTES # BLD AUTO: 1.37 10*3/MM3 (ref 0.7–3.1)
LYMPHOCYTES NFR BLD AUTO: 23.1 % (ref 19.6–45.3)
MCH RBC QN AUTO: 31.8 PG (ref 26.6–33)
MCHC RBC AUTO-ENTMCNC: 33.5 G/DL (ref 31.5–35.7)
MCV RBC AUTO: 94.7 FL (ref 79–97)
MONOCYTES # BLD AUTO: 0.44 10*3/MM3 (ref 0.1–0.9)
MONOCYTES NFR BLD AUTO: 7.4 % (ref 5–12)
NEUTROPHILS NFR BLD AUTO: 4.05 10*3/MM3 (ref 1.7–7)
NEUTROPHILS NFR BLD AUTO: 68.2 % (ref 42.7–76)
NRBC BLD AUTO-RTO: 0 /100 WBC (ref 0–0.2)
NT-PROBNP SERPL-MCNC: 4358 PG/ML (ref 0–900)
PLATELET # BLD AUTO: 231 10*3/MM3 (ref 140–450)
PMV BLD AUTO: 9 FL (ref 6–12)
POTASSIUM SERPL-SCNC: 4.2 MMOL/L (ref 3.5–5.2)
PROT SERPL-MCNC: 7.3 G/DL (ref 6–8.5)
RBC # BLD AUTO: 3.4 10*6/MM3 (ref 3.77–5.28)
SODIUM SERPL-SCNC: 132 MMOL/L (ref 136–145)
TROPONIN T SERPL HS-MCNC: 29 NG/L
WBC NRBC COR # BLD AUTO: 5.94 10*3/MM3 (ref 3.4–10.8)
WHOLE BLOOD HOLD COAG: NORMAL
WHOLE BLOOD HOLD SPECIMEN: NORMAL

## 2025-05-31 PROCEDURE — 83880 ASSAY OF NATRIURETIC PEPTIDE: CPT | Performed by: STUDENT IN AN ORGANIZED HEALTH CARE EDUCATION/TRAINING PROGRAM

## 2025-05-31 PROCEDURE — 99284 EMERGENCY DEPT VISIT MOD MDM: CPT | Performed by: STUDENT IN AN ORGANIZED HEALTH CARE EDUCATION/TRAINING PROGRAM

## 2025-05-31 PROCEDURE — 25010000002 FUROSEMIDE PER 20 MG: Performed by: PHYSICIAN ASSISTANT

## 2025-05-31 PROCEDURE — 85025 COMPLETE CBC W/AUTO DIFF WBC: CPT | Performed by: STUDENT IN AN ORGANIZED HEALTH CARE EDUCATION/TRAINING PROGRAM

## 2025-05-31 PROCEDURE — 80053 COMPREHEN METABOLIC PANEL: CPT | Performed by: STUDENT IN AN ORGANIZED HEALTH CARE EDUCATION/TRAINING PROGRAM

## 2025-05-31 PROCEDURE — 36415 COLL VENOUS BLD VENIPUNCTURE: CPT

## 2025-05-31 PROCEDURE — 93005 ELECTROCARDIOGRAM TRACING: CPT | Performed by: STUDENT IN AN ORGANIZED HEALTH CARE EDUCATION/TRAINING PROGRAM

## 2025-05-31 PROCEDURE — 96374 THER/PROPH/DIAG INJ IV PUSH: CPT

## 2025-05-31 PROCEDURE — 71045 X-RAY EXAM CHEST 1 VIEW: CPT

## 2025-05-31 PROCEDURE — 94640 AIRWAY INHALATION TREATMENT: CPT

## 2025-05-31 PROCEDURE — 84484 ASSAY OF TROPONIN QUANT: CPT | Performed by: STUDENT IN AN ORGANIZED HEALTH CARE EDUCATION/TRAINING PROGRAM

## 2025-05-31 RX ORDER — ALBUTEROL SULFATE 0.83 MG/ML
2.5 SOLUTION RESPIRATORY (INHALATION) ONCE
Status: COMPLETED | OUTPATIENT
Start: 2025-05-31 | End: 2025-05-31

## 2025-05-31 RX ORDER — SODIUM CHLORIDE 0.9 % (FLUSH) 0.9 %
10 SYRINGE (ML) INJECTION AS NEEDED
Status: DISCONTINUED | OUTPATIENT
Start: 2025-05-31 | End: 2025-06-01 | Stop reason: HOSPADM

## 2025-05-31 RX ORDER — FUROSEMIDE 10 MG/ML
40 INJECTION INTRAMUSCULAR; INTRAVENOUS ONCE
Status: COMPLETED | OUTPATIENT
Start: 2025-05-31 | End: 2025-05-31

## 2025-05-31 RX ADMIN — FUROSEMIDE 40 MG: 10 INJECTION, SOLUTION INTRAMUSCULAR; INTRAVENOUS at 23:43

## 2025-05-31 RX ADMIN — ALBUTEROL SULFATE 2.5 MG: 2.5 SOLUTION RESPIRATORY (INHALATION) at 23:35

## 2025-06-01 VITALS
RESPIRATION RATE: 20 BRPM | DIASTOLIC BLOOD PRESSURE: 108 MMHG | TEMPERATURE: 98.1 F | HEART RATE: 86 BPM | OXYGEN SATURATION: 97 % | BODY MASS INDEX: 34.32 KG/M2 | WEIGHT: 206 LBS | HEIGHT: 65 IN | SYSTOLIC BLOOD PRESSURE: 148 MMHG

## 2025-06-01 LAB
GEN 5 1HR TROPONIN T REFLEX: 30 NG/L
TROPONIN T % DELTA: 3
TROPONIN T NUMERIC DELTA: 1 NG/L

## 2025-06-01 RX ORDER — FUROSEMIDE 40 MG/1
40 TABLET ORAL DAILY
Qty: 5 TABLET | Refills: 0 | Status: SHIPPED | OUTPATIENT
Start: 2025-06-01 | End: 2025-06-01

## 2025-06-01 RX ORDER — FUROSEMIDE 40 MG/1
40 TABLET ORAL DAILY
Qty: 5 TABLET | Refills: 0 | Status: SHIPPED | OUTPATIENT
Start: 2025-06-01 | End: 2025-06-06

## 2025-06-01 NOTE — ED PROVIDER NOTES
EMERGENCY DEPARTMENT ENCOUNTER    Pt Name: Raquel Mariee  MRN: [id number]  Pt :   1955  Room Number:    Date of encounter:  2025  PCP: Sanjeev Rawls MD  ED Provider: Keegan Jackson PA-C    Historian: Patient      HPI:  Chief Complaint   Patient presents with    Shortness of Breath          Context: Raquel Mariee is a 69 y.o. female who presents to the ED c/o shortness of breath.  Patient gets IVIG infusions monthly and has for 20 years due to gammaglobulin deficiency.  States in the past has had issues with pulmonary edema had transfusions and has taken Lasix for several days afterwards however did not have any today to take and came to the ER for onset of some shortness of breath beginning this morning.  States she feels as though her lower extremities are swollen.      PAST MEDICAL HISTORY  Past Medical History:   Diagnosis Date    Anxiety     Aortic valve stenosis     Cardiac murmur     Cataract, bilateral     CHF (congestive heart failure)     Cholelithiasis GB out    Chronic kidney disease     Stage III    Clostridium difficile infection     in her 30s    Colon polyp Ever since having colonoscopies.    Common variable immunodeficiency     IVIG infusions    COPD (chronic obstructive pulmonary disease)     Coronary artery disease     Prinz metal angina & aortic stenosis    Depression     Diabetes mellitus     type II    Eosinophilic asthma     Fibromyalgia, primary     Full dentures     GERD (gastroesophageal reflux disease)     History of transfusion     in  at [facility].  No reaction noted, patient states she does have an antibody from transfusion.    Hypertension     Hypogammaglobulinemia     Hypothyroidism     Irritable bowel syndrome     Migraines     Morbid obesity     Optic neuritis     Optic neuropathy     Osteoarthritis     Prinzmetal angina     Pseudomonas infection 1998    lungs    PTSD (post-traumatic stress disorder)     Pulmonary edema      Renal insufficiency     Sinus tachycardia 1990    Sleep apnea     no longer uses/needs cpap    Stroke     TIA about 7 years ago    Tachycardia     TIA (transient ischemic attack) 2017    Trochanteric bursitis 01/25/2023         PAST SURGICAL HISTORY  Past Surgical History:   Procedure Laterality Date    APPENDECTOMY      BUNIONECTOMY Bilateral     CARDIAC CATHETERIZATION  2017    no stents needed    CARPAL TUNNEL RELEASE Right     COLONOSCOPY  2021    ENDOMETRIAL ABLATION  1997    EYE SURGERY  ? About 6-8 years ago    Laser for glaucoma    FRACTURE SURGERY  1995    No hardware; Tibeal plateau    GASTRIC BYPASS      HAND SURGERY Left     No hardware    HEMORRHOIDECTOMY N/A 04/09/2024    Procedure: HEMORRHOID BANDING X2;  Surgeon: Melissa Beck MD;  Location: [facility] OR;  Service: General;  Laterality: N/A;    INTERSTIM PLACEMENT N/A 05/03/2023    Procedure: INTERSTIM STAGES 1 AND 2 LEAD AND GENERATOR PLACEMENT;  Surgeon: Abner Nation MD;  Location: [facility] OR;  Service: Urology;  Laterality: N/A;    POSTERIOR VAGINAL REPAIR N/A 08/02/2023    Procedure: POSTERIOR COLPORRHAPHY;  Surgeon: Kellen Galan MD;  Location:  [facility] OR;  Service: Gynecology;  Laterality: N/A;    RECTAL EXAMINATION UNDER ANESTHESIA N/A 04/09/2024    Procedure: RECTAL EXAM UNDER ANESTHESIA;  Surgeon: Melissa Beck MD;  Location: [facility] OR;  Service: General;  Laterality: N/A;    REPLACEMENT TOTAL KNEE BILATERAL      TEETH EXTRACTION      all of bottom teeth removed    THORACOTOMY      TONSILLECTOMY      TOTAL ABDOMINAL HYSTERECTOMY WITH SALPINGO OOPHORECTOMY      TRACHEAL SURGERY      Repaired via thoracotomy    TUBAL ABDOMINAL LIGATION  1983    VENOUS ACCESS DEVICE (PORT) INSERTION      port a cath in the left chest wall         FAMILY HISTORY  Family History   Problem Relation Age of Onset    Diabetes Mother     Stroke Mother     Heart disease Mother     Hypertension Mother     Endometriosis Mother     Depression  Mother     Diabetes Father     Hypertension Father     Stroke Father     Heart attack Father     Asthma Father     COPD Father     Dementia Father     Heart disease Father     COPD Sister     Asthma Sister     Cancer Sister         Nasal cell skin    Brain cancer Sister     Diabetes Sister     Stroke Sister     Heart attack Sister     Endometriosis Sister     Depression Sister     Arthritis Sister         Rheumatoid    Hypertension Sister     Kidney disease Sister     Diabetes Sister     COPD Sister     Asthma Sister     Endometriosis Sister     Depression Sister     Cancer Sister         Glioma    Heart disease Sister         MI    Heart attack Sister     Endometriosis Sister     Asthma Sister     Hypertension Sister     Kidney disease Sister         ESRD    COPD Brother     Asthma Brother         Turned into COPD    Diabetes Brother     Asthma Brother     COPD Brother     Diabetes Brother     Hypertension Brother     Asthma Daughter     Endometriosis Daughter     Osteoarthritis Daughter     Hypertension Daughter     Kidney failure Daughter     Irritable bowel syndrome Daughter     Anxiety disorder Daughter     Bipolar disorder Daughter     Depression Daughter     Self-Injurious Behavior  Daughter     Suicide Attempts Daughter     Mental illness Daughter         Bipolar and eating fisorder    Asthma Daughter     Endometriosis Daughter     Osteoarthritis Daughter     Asthma Son     ADD / ADHD Son     Alcohol abuse Son     Drug abuse Son     Breast cancer Maternal Cousin     Heart disease Maternal Grandfather     Heart disease Maternal Uncle     OCD Neg Hx     Paranoid behavior Neg Hx     Schizophrenia Neg Hx     Seizures Neg Hx     Ovarian cancer Neg Hx          SOCIAL HISTORY  Social History     Socioeconomic History    Marital status:    Tobacco Use    Smoking status: Never     Passive exposure: Never    Smokeless tobacco: Never   Vaping Use    Vaping status: Never Used   Substance and Sexual Activity     Alcohol use: Yes     Comment: ONCE A YEAR    Drug use: Never    Sexual activity: Defer         ALLERGIES  Cefaclor, Cephalexin, Cephalosporins, Escitalopram oxalate, Ambien [zolpidem tartrate], Cardizem [diltiazem hcl], Metoclopramide, Mobic [meloxicam], Penicillins, Sumatriptan, Topamax [topiramate], Verapamil, Zolpidem, Amoxicillin, Edecrin [ethacrynic acid], Hydrochlorothiazide, and Sulfa antibiotics        REVIEW OF SYSTEMS  Review of Systems   Constitutional: Negative.    HENT: Negative.     Eyes: Negative.    Respiratory:  Positive for cough and shortness of breath.    Cardiovascular:  Positive for leg swelling. Negative for chest pain.   Gastrointestinal: Negative.    Genitourinary: Negative.    Musculoskeletal: Negative.    Skin: Negative.    Neurological: Negative.    Psychiatric/Behavioral: Negative.          All systems reviewed and negative except for those discussed in HPI.       PHYSICAL EXAM    I have reviewed the triage vital signs and nursing notes.    ED Triage Vitals [05/31/25 2212]   Temp Heart Rate Resp BP SpO2   98.1 °F (36.7 °C) 80 20 (!) 176/103 95 %      Temp src Heart Rate Source Patient Position BP Location FiO2 (%)   Oral Monitor Sitting Left arm --       Physical Exam  Vitals and nursing note reviewed.   Constitutional:       General: She is not in acute distress.     Appearance: She is well-developed. She is not ill-appearing, toxic-appearing or diaphoretic.   HENT:      Head: Normocephalic and atraumatic.   Eyes:      Extraocular Movements: Extraocular movements intact.   Cardiovascular:      Rate and Rhythm: Normal rate and regular rhythm.   Pulmonary:      Effort: Pulmonary effort is normal.      Comments: A few expiratory wheezes in the bilateral upper lobes, no crackles in the bases.  No rhonchi.  Musculoskeletal:         General: Normal range of motion.      Cervical back: Normal range of motion.      Comments: No pitting edema to the bilateral lower extremities   Skin:      General: Skin is warm and dry.   Neurological:      General: No focal deficit present.      Mental Status: She is alert.   Psychiatric:         Mood and Affect: Mood normal.         Behavior: Behavior normal.            LAB RESULTS  Recent Results (from the past 24 hours)   Comprehensive Metabolic Panel    Collection Time: 05/31/25 10:48 PM    Specimen: Blood   Result Value Ref Range    Glucose 84 65 - 99 mg/dL    BUN 16.0 8.0 - 23.0 mg/dL    Creatinine 0.96 0.57 - 1.00 mg/dL    Sodium 132 (L) 136 - 145 mmol/L    Potassium 4.2 3.5 - 5.2 mmol/L    Chloride 97 (L) 98 - 107 mmol/L    CO2 23.0 22.0 - 29.0 mmol/L    Calcium 9.2 8.6 - 10.5 mg/dL    Total Protein 7.3 6.0 - 8.5 g/dL    Albumin 3.9 3.5 - 5.2 g/dL    ALT (SGPT) 22 1 - 33 U/L    AST (SGOT) 34 (H) 1 - 32 U/L    Alkaline Phosphatase 45 39 - 117 U/L    Total Bilirubin 0.4 0.0 - 1.2 mg/dL    Globulin 3.4 gm/dL    A/G Ratio 1.1 g/dL    BUN/Creatinine Ratio 16.7 7.0 - 25.0    Anion Gap 12.0 5.0 - 15.0 mmol/L    eGFR 64.2 >60.0 mL/min/1.73   BNP    Collection Time: 05/31/25 10:48 PM    Specimen: Blood   Result Value Ref Range    proBNP 4,358.0 (H) 0.0 - 900.0 pg/mL   High Sensitivity Troponin T    Collection Time: 05/31/25 10:48 PM    Specimen: Blood   Result Value Ref Range    HS Troponin T 29 (H) <14 ng/L   Green Top (Gel)    Collection Time: 05/31/25 10:48 PM   Result Value Ref Range    Extra Tube Hold for add-ons.    Lavender Top    Collection Time: 05/31/25 10:48 PM   Result Value Ref Range    Extra Tube hold for add-on    Gold Top - SST    Collection Time: 05/31/25 10:48 PM   Result Value Ref Range    Extra Tube Hold for add-ons.    Light Blue Top    Collection Time: 05/31/25 10:48 PM   Result Value Ref Range    Extra Tube Hold for add-ons.    CBC Auto Differential    Collection Time: 05/31/25 10:48 PM    Specimen: Blood   Result Value Ref Range    WBC 5.94 3.40 - 10.80 10*3/mm3    RBC 3.40 (L) 3.77 - 5.28 10*6/mm3    Hemoglobin 10.8 (L) 12.0 - 15.9 g/dL     Hematocrit 32.2 (L) 34.0 - 46.6 %    MCV 94.7 79.0 - 97.0 fL    MCH 31.8 26.6 - 33.0 pg    MCHC 33.5 31.5 - 35.7 g/dL    RDW 13.6 12.3 - 15.4 %    RDW-SD 47.3 37.0 - 54.0 fl    MPV 9.0 6.0 - 12.0 fL    Platelets 231 140 - 450 10*3/mm3    Neutrophil % 68.2 42.7 - 76.0 %    Lymphocyte % 23.1 19.6 - 45.3 %    Monocyte % 7.4 5.0 - 12.0 %    Eosinophil % 0.3 0.3 - 6.2 %    Basophil % 0.8 0.0 - 1.5 %    Immature Grans % 0.2 0.0 - 0.5 %    Neutrophils, Absolute 4.05 1.70 - 7.00 10*3/mm3    Lymphocytes, Absolute 1.37 0.70 - 3.10 10*3/mm3    Monocytes, Absolute 0.44 0.10 - 0.90 10*3/mm3    Eosinophils, Absolute 0.02 0.00 - 0.40 10*3/mm3    Basophils, Absolute 0.05 0.00 - 0.20 10*3/mm3    Immature Grans, Absolute 0.01 0.00 - 0.05 10*3/mm3    nRBC 0.0 0.0 - 0.2 /100 WBC   High Sensitivity Troponin T 1Hr    Collection Time: 05/31/25 11:48 PM    Specimen: Blood   Result Value Ref Range    HS Troponin T 30 (H) <14 ng/L    Troponin T Numeric Delta 1 ng/L    Troponin T % Delta 3 Abnormal if >/= 20%       If labs were ordered, I independently reviewed the results and considered them in treating the patient.        RADIOLOGY  XR Chest 1 View  Result Date: 6/1/2025  FINAL REPORT TECHNIQUE: null CLINICAL HISTORY: SOA COMPARISON: null FINDINGS: CHEST X-RAY FRONTAL VIEW COMPARISON: None. FINDINGS: A single frontal view of the chest was performed. A left-sided port is noted. Cardiac size is enlarged. There is mild prominence of the pulmonary vascularity. An area of atelectasis is noted within the lateral aspect of the right mid to lower lung. Pleural thickening or a tiny right-sided pleural effusion is questioned. No pneumothorax. Partial absence of the right fifth rib is noted.     IMPRESSION: 1. Cardiomegaly and mild prominence of the pulmonary vascularity. 2. An area of atelectasis is noted in the lateral aspect of the right mid to lower lung. 3. Pleural thickening or a tiny right-sided pleural effusion is questioned. Authenticated and  Electronically Signed by Ever Bruce MD on 06/01/2025 12:49:49 AM          PROCEDURES    Procedures    Interpretations    O2 Sat: The patient's oxygen saturation was 99% on Room Air.  This was independently interpreted by me as Normal    EKG: I reviewed and independently interpreted the EKG as sinus rhythm with a rate of 79 bpm, occasional PVCs, no ST elevation.    Cardiac Monitoring: I reviewed and independently interpreted the Rhythm Strip as Normal Sinus rhythm rate of 77    MEDICATIONS GIVEN IN ER    Medications   sodium chloride 0.9 % flush 10 mL (has no administration in time range)   albuterol (PROVENTIL) nebulizer solution 0.083% 2.5 mg/3mL (2.5 mg Nebulization Given 5/31/25 6355)   furosemide (LASIX) injection 40 mg (40 mg Intravenous Given 5/31/25 3793)         MEDICAL DECISION MAKING, PROGRESS, and CONSULTS    All labs, if obtained, have been independently reviewed by me.  All radiology studies, if obtained, have been reviewed by me and the radiologist dictating the report.  All EKG's, if obtained, have been independently viewed and interpreted by me      Discussion below represents my analysis of pertinent findings related to patient's condition, differential diagnosis, treatment plan and final disposition.      Differential diagnosis:    Pulmonary edema, ACS, CHF exacerbation, pneumonia    Additional Sources:  None      Orders placed during this visit:  Orders Placed This Encounter   Procedures    XR Chest 1 View    Mohler Draw    Comprehensive Metabolic Panel    BNP    High Sensitivity Troponin T    CBC Auto Differential    High Sensitivity Troponin T 1Hr    NPO Diet NPO Type: Strict NPO    Undress & Gown    Continuous Pulse Oximetry    Vital Signs    Oxygen Therapy- Nasal Cannula; Titrate 1-6 LPM Per SpO2; 90 - 95%    ECG 12 Lead Dyspnea    Insert Peripheral IV    CBC & Differential    Green Top (Gel)    Lavender Top    Gold Top - SST    Light Blue Top         Additional orders considered but not  ordered:  None    ED Course:    Consultants:  None    ED Course as of 06/01/25 0104   Sat May 31, 2025   2231 I have reviewed the mid-level provider(s) note and verbally discussed the case/plan of care.  I was available for consultation as needed at all times during the patient's visit in the Emergency Department.  I agree with the clinical impression, plan, and disposition unless otherwise stated in the MDM below.    ATTENDING ATTESTATION  HPI: 69 y.o. female who presents with shortness of breath.  PMHx asthma, diabetes, CVID on IVIG infusions.  Last received infusion yesterday.  Began experiencing shortness of breath, wheezing, cough this morning.  Has developed pulmonary edema from previous infusions.    MDM: ED workup reviewed.    EKG per my interpretation normal sinus rhythm, rate 79, normal axis, frequent PVCs, no STEMI, normal QRS QTC intervals.   [JS]   Sun Jun 01, 2025 0057 Patient has had 12 to 1300 cc of urine out, states feeling remarkably better has no shortness of breath, states she can already get her rings off of her finger and she could not earlier feels as though her legs are not swollen, states was 7 out of 10 and short of breath when she came in currently 0 out of 10.  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 [TM]      ED Course User Index  [JS] Temo Camargo DO  [TM] Keegan Jackson PA-C           After my consideration of clinical presentation and any laboratory/radiology studies obtained, I discussed the findings with the patient/patient representative who is in agreement with the treatment plan and the final disposition. Risks and benefits of discharge were discussed.     AS OF 01:04 EDT VITALS:    BP - (!) 148/108  HR - 86  TEMP - 98.1 °F (36.7 °C) (Oral)  O2 SATS -  97%    I reviewed the patient's prescription monitoring report if available prior to discharge    DIAGNOSIS  Final diagnoses:   Acute pulmonary edema   Shortness of breath         DISPOSITION  ED Disposition       ED Disposition   Discharge    Condition   Stable    Comment   --                   Please note that portions of this document were completed with voice recognition software.        Keegan Jackson PA-C  06/01/25 0104

## 2025-06-02 DIAGNOSIS — Z79.4 TYPE 2 DIABETES MELLITUS WITHOUT COMPLICATION, WITH LONG-TERM CURRENT USE OF INSULIN: ICD-10-CM

## 2025-06-02 DIAGNOSIS — E11.9 TYPE 2 DIABETES MELLITUS WITHOUT COMPLICATION, WITH LONG-TERM CURRENT USE OF INSULIN: ICD-10-CM

## 2025-06-03 ENCOUNTER — TREATMENT (OUTPATIENT)
Dept: PHYSICAL THERAPY | Facility: CLINIC | Age: 70
End: 2025-06-03
Payer: MEDICARE

## 2025-06-03 ENCOUNTER — OFFICE VISIT (OUTPATIENT)
Dept: INTERNAL MEDICINE | Facility: CLINIC | Age: 70
End: 2025-06-03
Payer: MEDICARE

## 2025-06-03 VITALS
HEIGHT: 65 IN | TEMPERATURE: 97 F | DIASTOLIC BLOOD PRESSURE: 76 MMHG | HEART RATE: 83 BPM | OXYGEN SATURATION: 98 % | WEIGHT: 206 LBS | SYSTOLIC BLOOD PRESSURE: 112 MMHG | RESPIRATION RATE: 16 BRPM | BODY MASS INDEX: 34.32 KG/M2

## 2025-06-03 DIAGNOSIS — M25.552 PAIN OF LEFT HIP: Primary | ICD-10-CM

## 2025-06-03 DIAGNOSIS — E87.1 HYPONATREMIA: ICD-10-CM

## 2025-06-03 DIAGNOSIS — E87.6 HYPOKALEMIA: ICD-10-CM

## 2025-06-03 DIAGNOSIS — G89.29 CHRONIC PAIN OF LEFT KNEE: ICD-10-CM

## 2025-06-03 DIAGNOSIS — M25.552 LEFT HIP PAIN: ICD-10-CM

## 2025-06-03 DIAGNOSIS — R06.02 SHORTNESS OF BREATH: Primary | ICD-10-CM

## 2025-06-03 DIAGNOSIS — M25.562 CHRONIC PAIN OF LEFT KNEE: ICD-10-CM

## 2025-06-03 PROCEDURE — 97140 MANUAL THERAPY 1/> REGIONS: CPT | Performed by: PHYSICAL THERAPIST

## 2025-06-03 PROCEDURE — 97110 THERAPEUTIC EXERCISES: CPT | Performed by: PHYSICAL THERAPIST

## 2025-06-03 RX ORDER — LEVOTHYROXINE SODIUM 50 UG/1
50 TABLET ORAL DAILY
Qty: 90 TABLET | Refills: 3 | Status: SHIPPED | OUTPATIENT
Start: 2025-06-03

## 2025-06-03 RX ORDER — FUROSEMIDE 40 MG/1
40 TABLET ORAL DAILY
Qty: 5 TABLET | Refills: 0 | Status: SHIPPED | OUTPATIENT
Start: 2025-06-03 | End: 2025-06-11

## 2025-06-03 RX ORDER — FUROSEMIDE 40 MG/1
40 TABLET ORAL DAILY PRN
Qty: 10 TABLET | Refills: 2 | Status: SHIPPED | OUTPATIENT
Start: 2025-06-03

## 2025-06-03 RX ORDER — OMEPRAZOLE 40 MG/1
40 CAPSULE, DELAYED RELEASE ORAL 2 TIMES DAILY
Qty: 180 CAPSULE | Refills: 3 | Status: SHIPPED | OUTPATIENT
Start: 2025-06-03

## 2025-06-03 RX ORDER — POTASSIUM CITRATE 1080 MG/1
10 TABLET, EXTENDED RELEASE ORAL DAILY
Qty: 90 TABLET | Refills: 3 | Status: SHIPPED | OUTPATIENT
Start: 2025-06-03

## 2025-06-03 NOTE — PROGRESS NOTES
Subjective     Patient ID: Raquel Mariee is a 69 y.o. female. Patient is here for management of multiple medical problems.     Chief Complaint   Patient presents with    Hospital Follow Up Visit     SOB, pulmonary edema, after infusion on Friday     History of Present Illness     XR KNEE 3 VW LEFT  Order: [id number]  Impression    IMPRESSION: Postoperative changes total knee arthroplasty with patellar  resurfacing. This is new from the April 10, 2018 exam. No hardware failure  or loosening is evident. Alignment is normal. Mild soft tissue swelling  with probable joint effusion which may be postoperative. Correlate  clinically.  Narrative      XR KNEE 3 VW LEFT    HISTORY: Presence of left artificial knee joint    FINDINGS/  Exam End: 09/07/18 12:13           The following portions of the patient's history were reviewed and updated as appropriate: allergies, current medications, past family history, past medical history, past social history, past surgical history and problem list.    Review of Systems    Current Outpatient Medications:     acetaminophen-codeine (TYLENOL with CODEINE #3) 300-30 MG per tablet, Take 1 tablet by mouth Every 4 (Four) Hours As Needed for Moderate Pain., Disp: 15 tablet, Rfl: 1    acetaminophen-codeine (TYLENOL with CODEINE #3) 300-30 MG per tablet, Take 1 tablet by mouth Every 6 (Six) Hours., Disp: 20 tablet, Rfl: 0    albuterol sulfate HFA (Ventolin HFA) 108 (90 Base) MCG/ACT inhaler, Inhale 2 puffs Every 4 (Four) to 6 (Six) Hours As Needed for cough, wheeze, and shortness of breath, Disp: 8.5 g, Rfl: 3    albuterol sulfate  (90 Base) MCG/ACT inhaler, Inhale 2 puffs by mouth every 4 to 6 hours as needed for cough, wheeze, shortness of breath. Use with vortex spacer, Disp: 8.5 g, Rfl: 3    allopurinol (ZYLOPRIM) 100 MG tablet, Take 1 tablet by mouth Daily., Disp: 90 tablet, Rfl: 3    betamethasone valerate (VALISONE) 0.1 % cream, Apply topically to the appropriate area as  directed Daily. (Patient taking differently: Apply  topically to the appropriate area as directed Daily As Needed.), Disp: 45 g, Rfl: 11    Budeson-Glycopyrrol-Formoterol (BREZTRI) 160-9-4.8 MCG/ACT aerosol inhaler, Inhale 2 puffs by mouth 2 (Two) Times a Day. Rinse out mouth after use, Disp: 32.1 g, Rfl: 3    buPROPion SR (WELLBUTRIN SR) 150 MG 12 hr tablet, Take 1 tablet by mouth 2 (Two) Times a Day., Disp: 180 tablet, Rfl: 3    calcium carbonate (Antacid Maximum) 1000 MG chewable tablet, Chew 500 mg 3 (Three) Times a Day., Disp: , Rfl:     cholecalciferol (VITAMIN D3) 25 MCG (1000 UT) tablet, Take 1 tablet by mouth Daily., Disp: , Rfl:     denosumab (PROLIA) 60 MG/ML solution prefilled syringe syringe, Inject 1 mL under the skin into the appropriate area as directed Every 6 (Six) Months., Disp: , Rfl:     diphenhydrAMINE (Benadryl Allergy) 25 MG tablet, Take 1-2 tablets by mouth Every 4-6 Hours As Needed., Disp: 90 tablet, Rfl: 11    DULoxetine (Cymbalta) 60 MG capsule, Take 1 capsule by mouth Daily., Disp: 90 capsule, Rfl: 3    estradiol (ESTRACE) 0.1 MG/GM vaginal cream, Insert 0.5 grams vaginally twice weekly, Disp: 42.5 g, Rfl: 5    ferrous sulfate 325 (65 FE) MG EC tablet, Take 2 tablets by mouth Daily With Breakfast., Disp: , Rfl:     fexofenadine (ALLEGRA) 180 MG tablet, Take 1 tablet by mouth Daily., Disp: , Rfl:     Fluticasone Propionate (Xhance) 93 MCG/ACT Exhaler Suspension, Instill 1 spray in both nostrils twice a day, Disp: 16 mL, Rfl: 11    Fluticasone Propionate (Xhance) 93 MCG/ACT Exhaler Suspension, Administer 1 spray into the nostril(s) as directed by provider 2 (Two) Times a Day., Disp: 16 mL, Rfl: 11    furosemide (LASIX) 40 MG tablet, Take 1 tablet by mouth Daily for 5 days., Disp: 5 tablet, Rfl: 0    glucosamine-chondroitin 500-400 MG capsule capsule, Take 1 capsule by mouth 2 (Two) Times a Day With Meals., Disp: , Rfl:     guaiFENesin-codeine (ROMILAR-AC) 100-10 MG/5ML solution/syrup, Take  5 mL by mouth 3 (Three) Times a Day As Needed for Cough or Congestion., Disp: 90 mL, Rfl: 0    Hydrocortisone, Perianal, (Anusol-HC) 2.5 % rectal cream, Apply rectally 2 times daily, Disp: 30 g, Rfl: 2    Hypertonic Nasal Wash (Sinus Rinse Kit) pack, use once or twice daily as needed, Disp: 1 each, Rfl: 3    immune globulin, human, (Gammagard) 20 GM/200ML solution infusion, Infuse 40 g into a venous catheter Every 28 (Twenty-Eight) Days., Disp: 400 mL, Rfl: 0    immune globulin, human, (Gammagard) 30 GM/300ML solution infusion, 40 grams IV every 4 weeks, Disp: 40 mL, Rfl: 11    immune globulin, human, (Gammagard) 30 GM/300ML solution infusion, 50 grams IV every 4 weeks, Disp: 300 mL, Rfl: 6    ipratropium (ATROVENT) 0.06 % nasal spray, Instill 1-2 sprays in each nostril 2-3 times daily as needed, Disp: 15 mL, Rfl: 3    ipratropium (ATROVENT) 0.06 % nasal spray, Instill 1-2 sprays each nostril 2-3 times daily as needed., Disp: 15 mL, Rfl: 3    ipratropium-albuterol (DUO-NEB) 0.5-2.5 mg/3 ml nebulizer, Inhale the contents of 1 vial through a nebulizer every 4-6 hours as needed for cough,wheezing and shortness of breath., Disp: 90 mL, Rfl: 3    isosorbide mononitrate (IMDUR) 30 MG 24 hr tablet, Take 1 tablet by mouth Every Morning., Disp: 90 tablet, Rfl: 0    levothyroxine (SYNTHROID, LEVOTHROID) 50 MCG tablet, Take 1 tablet by mouth Daily., Disp: 90 tablet, Rfl: 3    lidocaine (XYLOCAINE) 5 % ointment, Apply 1 Application topically to the appropriate area as directed Every 2 (Two) Hours As Needed for Mild Pain., Disp: 50 g, Rfl: 0    linaclotide (Linzess) 145 MCG capsule capsule, Take 1 capsule by mouth Every Morning Before Breakfast., Disp: 30 capsule, Rfl: 11    Magnesium 250 MG tablet, Take 2 tablets by mouth 2 (Two) Times a Day., Disp: , Rfl:     Mepolizumab (Nucala) 100 MG/ML solution auto-injector, Inject 1 mL under the skin into the appropriate area as directed Every 28 (Twenty-Eight) Days. Obtaining medication  "from [place] to Nucala PAP, Disp: , Rfl:     Misc. Devices (Sitz Bath) misc, Use 1 each As Needed (for hemorrhoids)., Disp: 1 each, Rfl: 0    montelukast (SINGULAIR) 10 MG tablet, Take 1 tablet by mouth every night at bedtime., Disp: 90 tablet, Rfl: 3    montelukast (SINGULAIR) 10 MG tablet, Take 1 tablet by mouth every night at bedtime., Disp: 30 tablet, Rfl: 11    multivitamin with minerals tablet tablet, Take 1 tablet by mouth Daily., Disp: , Rfl:     omeprazole (priLOSEC) 40 MG capsule, Take 1 capsule by mouth 2 (Two) Times a Day., Disp: 180 capsule, Rfl: 3    ondansetron (ZOFRAN) 4 MG tablet, Take 1 tablet by mouth Every 8 (Eight) Hours As Needed for Nausea or Vomiting., Disp: 30 tablet, Rfl: 11    potassium citrate (UROCIT-K) 10 MEQ (1080 MG) CR tablet, Take 1 tablet by mouth Daily., Disp: 90 tablet, Rfl: 3    predniSONE (DELTASONE) 20 MG tablet, Take 2 tablets prior to Gammgard infusion, Disp: 6 tablet, Rfl: 3    predniSONE (DELTASONE) 20 MG tablet, Take 2 tablets by mouth Daily., Disp: 10 tablet, Rfl: 0    solifenacin (VESIcare) 5 MG tablet, Take 1 tablet by mouth Daily., Disp: 30 tablet, Rfl: 3    tiZANidine (ZANAFLEX) 4 MG tablet, Take 1 tablet by mouth 2 (Two) Times a Day As Needed for Muscle Spasms., Disp: 100 tablet, Rfl: 3    ubrogepant (Ubrelvy) 100 MG tablet, Take 1 tablet by mouth 1 (One) Time As Needed (migraine)., Disp: 5 tablet, Rfl: 0    valsartan (DIOVAN) 160 MG tablet, Take 1 tablet by mouth Daily., Disp: 90 tablet, Rfl: 3    vitamin B-12 (CYANOCOBALAMIN) 1000 MCG tablet, Take 1 tablet by mouth Daily., Disp: , Rfl:     furosemide (Lasix) 40 MG tablet, Take 1 tablet by mouth Daily As Needed (soa)., Disp: 10 tablet, Rfl: 2    Objective      Blood pressure 112/76, pulse 83, temperature 97 °F (36.1 °C), resp. rate 16, height 165.1 cm (65\"), weight 93.4 kg (206 lb), SpO2 98%, not currently breastfeeding.            Physical Exam     General Appearance:    Alert, cooperative, no distress, appears " stated age   Head:    Normocephalic, without obvious abnormality, atraumatic   Eyes:    PERRL, conjunctiva/corneas clear, EOM's intact   Ears:    Normal TM's and external ear canals, both ears   Nose:   Nares normal, septum midline, mucosa normal, no drainage   or sinus tenderness   Throat:   Lips, mucosa, and tongue normal; teeth and gums normal   Neck:   Supple, symmetrical, trachea midline, no adenopathy;        thyroid:  No enlargement/tenderness/nodules; no carotid    bruit or JVD   Back:     Symmetric, no curvature, ROM normal, no CVA tenderness   Lungs:     Clear to auscultation bilaterally, respirations unlabored   Chest wall:    No tenderness or deformity   Heart:    Regular rate and rhythm, S1 and S2 normal, no murmur,        rub or gallop   Abdomen:     Soft, non-tender, bowel sounds active all four quadrants,     no masses, no organomegaly   Extremities:   Extremities normal, atraumatic, no cyanosis or edema   Pulses:   2+ and symmetric all extremities   Skin:   Skin color, texture, turgor normal, no rashes or lesions   Lymph nodes:   Cervical, supraclavicular, and axillary nodes normal   Neurologic:   CNII-XII intact. Normal strength, sensation and reflexes       throughout      Results for orders placed or performed during the hospital encounter of 05/31/25   Comprehensive Metabolic Panel    Collection Time: 05/31/25 10:48 PM    Specimen: Blood   Result Value Ref Range    Glucose 84 65 - 99 mg/dL    BUN 16.0 8.0 - 23.0 mg/dL    Creatinine 0.96 0.57 - 1.00 mg/dL    Sodium 132 (L) 136 - 145 mmol/L    Potassium 4.2 3.5 - 5.2 mmol/L    Chloride 97 (L) 98 - 107 mmol/L    CO2 23.0 22.0 - 29.0 mmol/L    Calcium 9.2 8.6 - 10.5 mg/dL    Total Protein 7.3 6.0 - 8.5 g/dL    Albumin 3.9 3.5 - 5.2 g/dL    ALT (SGPT) 22 1 - 33 U/L    AST (SGOT) 34 (H) 1 - 32 U/L    Alkaline Phosphatase 45 39 - 117 U/L    Total Bilirubin 0.4 0.0 - 1.2 mg/dL    Globulin 3.4 gm/dL    A/G Ratio 1.1 g/dL    BUN/Creatinine Ratio 16.7 7.0  - 25.0    Anion Gap 12.0 5.0 - 15.0 mmol/L    eGFR 64.2 >60.0 mL/min/1.73   BNP    Collection Time: 05/31/25 10:48 PM    Specimen: Blood   Result Value Ref Range    proBNP 4,358.0 (H) 0.0 - 900.0 pg/mL   High Sensitivity Troponin T    Collection Time: 05/31/25 10:48 PM    Specimen: Blood   Result Value Ref Range    HS Troponin T 29 (H) <14 ng/L   CBC Auto Differential    Collection Time: 05/31/25 10:48 PM    Specimen: Blood   Result Value Ref Range    WBC 5.94 3.40 - 10.80 10*3/mm3    RBC 3.40 (L) 3.77 - 5.28 10*6/mm3    Hemoglobin 10.8 (L) 12.0 - 15.9 g/dL    Hematocrit 32.2 (L) 34.0 - 46.6 %    MCV 94.7 79.0 - 97.0 fL    MCH 31.8 26.6 - 33.0 pg    MCHC 33.5 31.5 - 35.7 g/dL    RDW 13.6 12.3 - 15.4 %    RDW-SD 47.3 37.0 - 54.0 fl    MPV 9.0 6.0 - 12.0 fL    Platelets 231 140 - 450 10*3/mm3    Neutrophil % 68.2 42.7 - 76.0 %    Lymphocyte % 23.1 19.6 - 45.3 %    Monocyte % 7.4 5.0 - 12.0 %    Eosinophil % 0.3 0.3 - 6.2 %    Basophil % 0.8 0.0 - 1.5 %    Immature Grans % 0.2 0.0 - 0.5 %    Neutrophils, Absolute 4.05 1.70 - 7.00 10*3/mm3    Lymphocytes, Absolute 1.37 0.70 - 3.10 10*3/mm3    Monocytes, Absolute 0.44 0.10 - 0.90 10*3/mm3    Eosinophils, Absolute 0.02 0.00 - 0.40 10*3/mm3    Basophils, Absolute 0.05 0.00 - 0.20 10*3/mm3    Immature Grans, Absolute 0.01 0.00 - 0.05 10*3/mm3    nRBC 0.0 0.0 - 0.2 /100 WBC   Green Top (Gel)    Collection Time: 05/31/25 10:48 PM   Result Value Ref Range    Extra Tube Hold for add-ons.    Lavender Top    Collection Time: 05/31/25 10:48 PM   Result Value Ref Range    Extra Tube hold for add-on    Gold Top - SST    Collection Time: 05/31/25 10:48 PM   Result Value Ref Range    Extra Tube Hold for add-ons.    Light Blue Top    Collection Time: 05/31/25 10:48 PM   Result Value Ref Range    Extra Tube Hold for add-ons.    High Sensitivity Troponin T 1Hr    Collection Time: 05/31/25 11:48 PM    Specimen: Blood   Result Value Ref Range    HS Troponin T 30 (H) <14 ng/L    Troponin T  Numeric Delta 1 ng/L    Troponin T % Delta 3 Abnormal if >/= 20%     *Note: Due to a large number of results and/or encounters for the requested time period, some results have not been displayed. A complete set of results can be found in Results Review.         Assessment & Plan     XR KNEE 3 VW LEFT  Order: [id number]  Impression    IMPRESSION: Postoperative changes total knee arthroplasty with patellar  resurfacing. This is new from the April 10, 2018 exam. No hardware failure  or loosening is evident. Alignment is normal. Mild soft tissue swelling  with probable joint effusion which may be postoperative. Correlate  clinically.  Narrative      XR KNEE 3 VW LEFT    HISTORY: Presence of left artificial knee joint    FINDINGS/  Exam End: 09/07/18 12:13   Left hip and left knee pain.  Sees ortho in [place].   They will need to order the XR's they want and compare complaints with PE. To make a treatment plan. Pt understands and agrees.          Diagnoses and all orders for this visit:    1. Shortness of breath (Primary)    2. Hypokalemia  -     potassium citrate (UROCIT-K) 10 MEQ (1080 MG) CR tablet; Take 1 tablet by mouth Daily.  Dispense: 90 tablet; Refill: 3    3. Hyponatremia    4. Chronic pain of left knee    5. Left hip pain    Other orders  -     furosemide (Lasix) 40 MG tablet; Take 1 tablet by mouth Daily As Needed (soa).  Dispense: 10 tablet; Refill: 2  -     furosemide (LASIX) 40 MG tablet; Take 1 tablet by mouth Daily for 5 days.  Dispense: 5 tablet; Refill: 0      No follow-ups on file.     Has apt 8/4.         There are no Patient Instructions on file for this visit.     Sanjeev Rawls MD    Assessment & Plan

## 2025-06-03 NOTE — PROGRESS NOTES
Physical Therapy Daily Treatment Note      Visit #: 7    Raquel Mariee reports 8/10 pain today at rest.  Pt reports that she went into the hospital for heart failure over the weekend. Pt states that since then, her hip has been having a lot more pain and has not been able to move as well. Pt reports that she is cleared for normal activities.         Objective Pt present to PT today with no distress at rest.     Pt performed less strenuous activities today.     Pt with no increased pain in the L hip following activities in the clinic today.       See Exercise, Manual, and Modality Logs for complete treatment.     Assessment/Plan  Pt did well with activities in the clinic today although was limited due to pain and performed less activities due to recent hospitalization. Pt to follow up later this week as tolerated.       Progress per Plan of Care      Visit Diagnosis:    ICD-10-CM ICD-9-CM   1. Pain of left hip  M25.552 719.45              Timed Treatment:  Manual Therapy:    13     mins  88913;  Therapeutic Exercise:    10     mins  84493;     Neuromuscular Laurie:        mins  27654;    Therapeutic Activity:          mins  25668;     Gait Training:           mins  79664;     Ultrasound:          mins  17459;    Electrical Stimulation:         mins  78828 ( );  Dry Needling          mins self-pay  Iontophoresis          mins 21849    Untimed Treatments:  Electrical Stimulation:         mins  10937 (MC );  Dry Needling:                     mins  Ultrasound:                         mins  80860;        Timed Treatment:   23   mins   Total Treatment:     40   mins    Garry Miguel, PT  Physical Therapist

## 2025-06-05 ENCOUNTER — TREATMENT (OUTPATIENT)
Dept: PHYSICAL THERAPY | Facility: CLINIC | Age: 70
End: 2025-06-05
Payer: MEDICARE

## 2025-06-05 DIAGNOSIS — M25.552 PAIN OF LEFT HIP: Primary | ICD-10-CM

## 2025-06-05 PROCEDURE — 97112 NEUROMUSCULAR REEDUCATION: CPT | Performed by: PHYSICAL THERAPIST

## 2025-06-05 PROCEDURE — 97140 MANUAL THERAPY 1/> REGIONS: CPT | Performed by: PHYSICAL THERAPIST

## 2025-06-05 PROCEDURE — 97110 THERAPEUTIC EXERCISES: CPT | Performed by: PHYSICAL THERAPIST

## 2025-06-05 NOTE — PROGRESS NOTES
Physical Therapy Daily Treatment Note      Visit #: 8    Raquel Mariee reports 0/10 pain today at rest. Pt reports that her L hip and knee continue to bother her when walking and sitting. Pt states that her pain is wearing her down some.         Objective Pt present to PT today with no distress at rest.     Pt with antalgic gait noted with straight cane today with decreased ambulation speed.     Pt with no increased pain in the L hip with exercises today although has tenderness in the L adductors.     Pt with swelling noted in the L knee.     Pt with discomfort with IR of the L hip.       See Exercise, Manual, and Modality Logs for complete treatment.     Assessment/Plan  Pt continues to have limited function in the L knee and hip with decreased WB tolerance. Pt to continue with PT to help improve L hip strength, stability, and pain free function.       Progress per Plan of Care      Visit Diagnosis:    ICD-10-CM ICD-9-CM   1. Pain of left hip  M25.552 719.45              Timed Treatment:  Manual Therapy:    13     mins  80046;  Therapeutic Exercise:    15     mins  06893;     Neuromuscular Laurie:    10    mins  83778;    Therapeutic Activity:          mins  82327;     Gait Training:           mins  60927;     Ultrasound:          mins  89310;    Electrical Stimulation:         mins  13075 ( );  Dry Needling          mins self-pay  Iontophoresis          mins 01632    Untimed Treatments:  Electrical Stimulation:         mins  02296 ( );  Dry Needling:                     mins  Ultrasound:                         mins  37344;        Timed Treatment:   38   mins   Total Treatment:     47   mins    Garry Miguel, PT  Physical Therapist

## 2025-06-09 ENCOUNTER — TREATMENT (OUTPATIENT)
Dept: PHYSICAL THERAPY | Facility: CLINIC | Age: 70
End: 2025-06-09
Payer: MEDICARE

## 2025-06-09 DIAGNOSIS — M25.552 PAIN OF LEFT HIP: Primary | ICD-10-CM

## 2025-06-09 PROCEDURE — 97140 MANUAL THERAPY 1/> REGIONS: CPT | Performed by: PHYSICAL THERAPIST

## 2025-06-09 PROCEDURE — 97112 NEUROMUSCULAR REEDUCATION: CPT | Performed by: PHYSICAL THERAPIST

## 2025-06-09 PROCEDURE — 97110 THERAPEUTIC EXERCISES: CPT | Performed by: PHYSICAL THERAPIST

## 2025-06-09 NOTE — PROGRESS NOTES
Physical Therapy Daily Treatment Note      Visit #: 9    Raquel Mariee reports 7/10 pain today at rest.  Pt states that she is able to sleep in bed more now compared to previous. Pt states that she had a good day on Saturday with decreased pain, but then began to have increased pain again on Sunday. Pt states that she has been walking short distances without cane with increased ability. Pt states she has ortho visit on Friday for left hip.    Objective Pt present to PT today with no distress at rest.    Pt with no increased pain in the L hip with activities in the clinic.     Pt with antalgic gait noted with mobility around the clinic.       See Exercise, Manual, and Modality Logs for complete treatment.     Assessment/Plan  Pt continues to have pain, weakness, and decreased function in the L hip. Pt would benefit from continued PT to help improve L hip stability, WB tolerance, and function with daily activities.       Progress per Plan of Care      Visit Diagnosis:    ICD-10-CM ICD-9-CM   1. Pain of left hip  M25.552 719.45              Timed Treatment:  Manual Therapy:    13     mins  48147;  Therapeutic Exercise:    20     mins  29169;     Neuromuscular Laurie:    15    mins  89181;    Therapeutic Activity:          mins  50580;     Gait Training:           mins  33012;     Ultrasound:          mins  54502;    Electrical Stimulation:         mins  70648 ( );  Dry Needling          mins self-pay  Iontophoresis          mins 27462    Untimed Treatments:  Electrical Stimulation:         mins  15316 ( );  Dry Needling:                     mins  Ultrasound:                         mins  26063;        Timed Treatment:   48   mins   Total Treatment:     53   mins    Gerson Sharp, PT Student

## 2025-06-09 NOTE — PROGRESS NOTES
Physical Therapy Daily Treatment Note      Visit #: 9    Raquel Mariee reports ***/10 pain today at rest.  ***        Objective Pt present to PT today with ***    ***      See Exercise, Manual, and Modality Logs for complete treatment.     Assessment/Plan  ***      {AMB PT PLAN (SOAP Note):23208}  ***    Visit Diagnosis:    ICD-10-CM ICD-9-CM   1. Pain of left hip  M25.552 719.45              Timed Treatment:  Manual Therapy:    ***     mins  05551;  Therapeutic Exercise:    ***     mins  97940;     Neuromuscular Laurie:    ***    mins  05770;    Therapeutic Activity:     ***     mins  56843;     Gait Training:      ***     mins  83722;     Ultrasound:     ***     mins  95347;    Electrical Stimulation:    ***     mins  44836 ( );  Dry Needling     ***     mins self-pay  Iontophoresis    ***      mins 05854    Untimed Treatments:  Electrical Stimulation:    ***     mins  14411 ( );  Dry Needling:                ***     mins  Ultrasound:                    ***     mins  12862;        Timed Treatment:   ***   mins   Total Treatment:     ***   mins    Garry Miguel, PT  Physical Therapist

## 2025-06-13 ENCOUNTER — OFFICE VISIT (OUTPATIENT)
Age: 70
End: 2025-06-13
Payer: MEDICARE

## 2025-06-13 VITALS
WEIGHT: 206 LBS | HEIGHT: 65 IN | SYSTOLIC BLOOD PRESSURE: 120 MMHG | DIASTOLIC BLOOD PRESSURE: 90 MMHG | BODY MASS INDEX: 34.32 KG/M2

## 2025-06-13 DIAGNOSIS — M24.852 OTHER SPECIFIC JOINT DERANGEMENTS OF LEFT HIP, NOT ELSEWHERE CLASSIFIED: ICD-10-CM

## 2025-06-13 DIAGNOSIS — M54.50 LEFT LOW BACK PAIN, UNSPECIFIED CHRONICITY, UNSPECIFIED WHETHER SCIATICA PRESENT: ICD-10-CM

## 2025-06-13 DIAGNOSIS — M16.12 PRIMARY OSTEOARTHRITIS OF LEFT HIP: ICD-10-CM

## 2025-06-13 DIAGNOSIS — M25.552 LEFT HIP PAIN: Primary | ICD-10-CM

## 2025-06-13 DIAGNOSIS — E66.811 CLASS 1 OBESITY WITHOUT SERIOUS COMORBIDITY WITH BODY MASS INDEX (BMI) OF 34.0 TO 34.9 IN ADULT, UNSPECIFIED OBESITY TYPE: ICD-10-CM

## 2025-06-13 PROCEDURE — 1159F MED LIST DOCD IN RCRD: CPT | Performed by: PHYSICIAN ASSISTANT

## 2025-06-13 PROCEDURE — 3080F DIAST BP >= 90 MM HG: CPT | Performed by: PHYSICIAN ASSISTANT

## 2025-06-13 PROCEDURE — 3074F SYST BP LT 130 MM HG: CPT | Performed by: PHYSICIAN ASSISTANT

## 2025-06-13 PROCEDURE — 1160F RVW MEDS BY RX/DR IN RCRD: CPT | Performed by: PHYSICIAN ASSISTANT

## 2025-06-13 PROCEDURE — 99213 OFFICE O/P EST LOW 20 MIN: CPT | Performed by: PHYSICIAN ASSISTANT

## 2025-06-13 NOTE — PROGRESS NOTES
[facility] Note        Subjective     CC: Follow-up (4 week follow up Primary osteoarthritis of left hip )      MIKE Mariee is a 69 y.o. female.  Patient returns for follow-up evaluation.  At her last appointment she was complaining of left-sided low back pain, left hip pain.  She was sent for a intra-articular diagnostic/therapeutic corticosteroid injection of the left hip.  She reports less than 50% improvement of symptoms.  She is now placing her hand stating she has noted increased pain to the left groin.  She does have a pending lumbar spine MRI next week, ordered by her pain management specialist ([facility]).    Pain scale: 8/10.  Patient is using a cane to assist with ambulation.  She currently takes Tylenol 3 for pain control.  She has been participating in PT.  Associated symptoms pain.  Worse with walking, going from a standing to a sitting position, sleeping, rising from a seated position, lying on the affected side.  Ice, heat, medication, elevating help.  Again some improvement was noted with the intra-articular hip injection but was less than 50% and only lasted for couple of days.       Overall, patient's symptoms are unchanged.    ROS:    Constiutional:Pt denies fever, chills, nausea, or vomiting.  MSK:as above        Objective      Past Medical History  Past Medical History:   Diagnosis Date    Anxiety     Aortic valve stenosis     Cardiac murmur     Cataract, bilateral     CHF (congestive heart failure)     Cholelithiasis GB out    Chronic kidney disease     Stage III    Clostridium difficile infection     in her 30s    Colon polyp Ever since having colonoscopies.    Common variable immunodeficiency     IVIG infusions    COPD (chronic obstructive pulmonary disease)     Coronary artery disease     Prinz metal angina & aortic stenosis    Depression     Diabetes mellitus     type II    Eosinophilic asthma     Fibromyalgia, primary     Full dentures      "GERD (gastroesophageal reflux disease)     History of transfusion     in 1979 at [facility] [place][note 2].  No reaction noted, patient states she does have an antibody from transfusion.    Hypertension     Hypogammaglobulinemia     Hypothyroidism     Irritable bowel syndrome     Migraines     Morbid obesity     Optic neuritis     Optic neuropathy     Osteoarthritis     Prinzmetal angina 1990    Pseudomonas infection 1998    lungs    PTSD (post-traumatic stress disorder)     Pulmonary edema     Renal insufficiency     Sinus tachycardia 1990    Sleep apnea     no longer uses/needs cpap    Stroke     TIA about 7 years ago    Tachycardia     TIA (transient ischemic attack) 2017    Trochanteric bursitis 01/25/2023     Social History     Socioeconomic History    Marital status:    Tobacco Use    Smoking status: Never     Passive exposure: Never    Smokeless tobacco: Never   Vaping Use    Vaping status: Never Used   Substance and Sexual Activity    Alcohol use: Yes     Comment: ONCE A YEAR    Drug use: Never    Sexual activity: Defer          Physical Exam  /90   Ht 165.1 cm (65\")   Wt 93.4 kg (206 lb)   BMI 34.28 kg/m²     Body mass index is 34.28 kg/m².    Patient is well nourished and well developed.        Ortho Exam  Left hip  Tenderness: Again positive tenderness noted to the low back, left buttock but positive groin pain.  Testing: Stinchfield, passive ER/IR of the hip positive throughout left low back and groin.  Motor/sensory: Grossly intact L2-S1.      Imaging/Labs/EMG Reviewed and Interpreted:  No new imaging today.      Assessment:  1. Left hip pain    2. Other specific joint derangements of left hip, not elsewhere classified    3. Primary osteoarthritis of left hip    4. Left low back pain, unspecified chronicity, unspecified whether sciatica present    5. Class 1 obesity without serious comorbidity with body mass index (BMI) of 34.0 to 34.9 in adult, unspecified obesity type        Plan:  Left " hip pain due to internal derangement in addition to mild degenerative changes.  Additionally patient with ongoing left back pain--has pending MRI for next week.  To make sure she gets MRI completed.  Still with concern that her back majority of her symptoms due to plain film imaging of her hips showing only mild degenerative changes with adequate joint space noted.  Also patient stated only a couple of days of relief with the injection at less than 50% which also could indicate more of back involvement then hip.  Continue with current pain management regimen through [facility].  Ordered MRI of left hip for further evaluation, specifically looking at the internal structure of her hip to include the soft tissues (capsule, tendon/labral pathology) and cartilage/bone (AVN, severity of arthritis)--will help to determine if surgical intervention warranted.  Patient does have a bladder stimulator that will need to be turned off prior to obtaining MRI.  This has been accomplished in the past with prior MRIs.  Obesity--patient with a BMI of 34.28.  She keep working on dieting, portion control, calorie restriction to help with weight loss.  Follow up after MRI completed.  This appointment needs to be on a Tuesday or Thursday at the [facility] location.  Again depending on MRI findings patient may still need further workup of her lumbar spine by orthospine/neurosurgery or consideration for injections by pain management.  Questions and concerns answered.      Jackelyn Salguero PA-C  06/16/25  07:41 EDT      Dictated Utilizing Dragon Dictation.

## 2025-06-16 ENCOUNTER — TELEPHONE (OUTPATIENT)
Dept: UROLOGY | Facility: CLINIC | Age: 70
End: 2025-06-16

## 2025-06-16 DIAGNOSIS — M54.50 ACUTE LOW BACK PAIN, UNSPECIFIED BACK PAIN LATERALITY, UNSPECIFIED WHETHER SCIATICA PRESENT: Primary | ICD-10-CM

## 2025-06-16 RX ORDER — ACETAMINOPHEN AND CODEINE PHOSPHATE 300; 30 MG/1; MG/1
1 TABLET ORAL EVERY 6 HOURS
Qty: 20 TABLET | Refills: 0 | Status: SHIPPED | OUTPATIENT
Start: 2025-06-16 | End: 2025-07-16

## 2025-06-16 NOTE — TELEPHONE ENCOUNTER
Kathie just told her to show up between 9-12 to let her see her!! She wont be seeing Joel until July.    Anjel MACIAS

## 2025-06-16 NOTE — TELEPHONE ENCOUNTER
Caller: Raquel Mariee     Relationship: [unfilled] SELF    Best call back number: [phone]    What is your medical concern? PT NEEDS AN APPT ON 06.25.2025 WHILE CHAR FROM [facility] IS AT THE OFFICE. PLEASE CALL HER TO ADVISE. THANK YOU

## 2025-06-18 ENCOUNTER — HOSPITAL ENCOUNTER (OUTPATIENT)
Dept: MRI IMAGING | Facility: HOSPITAL | Age: 70
Discharge: HOME | End: 2025-06-18
Admitting: PHYSICIAN ASSISTANT
Payer: MEDICARE

## 2025-06-18 ENCOUNTER — TELEPHONE (OUTPATIENT)
Dept: PHYSICAL THERAPY | Facility: CLINIC | Age: 70
End: 2025-06-18
Payer: MEDICARE

## 2025-06-18 DIAGNOSIS — M54.16 RADICULOPATHY, LUMBAR REGION: ICD-10-CM

## 2025-06-18 PROCEDURE — 72148 MRI LUMBAR SPINE W/O DYE: CPT

## 2025-06-18 NOTE — TELEPHONE ENCOUNTER
Caller: Raquel Mariee    Relationship: Self    What was the call regarding: WANTS TO LET FRANCISCO JAVIER KNOW SHE SAW HER ORTHO DOCTOR AND WANTS TO WAIT UNTIL MRI RESULTS COME BACK BEFORE CONTINUING PT

## 2025-06-25 RX ORDER — SODIUM CHLORIDE 0.9 % (FLUSH) 0.9 %
10 SYRINGE (ML) INJECTION AS NEEDED
Status: CANCELLED | OUTPATIENT
Start: 2025-06-25

## 2025-06-25 RX ORDER — HEPARIN SODIUM (PORCINE) LOCK FLUSH IV SOLN 100 UNIT/ML 100 UNIT/ML
500 SOLUTION INTRAVENOUS AS NEEDED
Status: CANCELLED | OUTPATIENT
Start: 2025-06-25

## 2025-06-27 ENCOUNTER — HOSPITAL ENCOUNTER (OUTPATIENT)
Facility: HOSPITAL | Age: 70
Discharge: HOME | End: 2025-06-27
Payer: MEDICARE

## 2025-06-27 VITALS
TEMPERATURE: 98.6 F | BODY MASS INDEX: 34.49 KG/M2 | OXYGEN SATURATION: 95 % | SYSTOLIC BLOOD PRESSURE: 163 MMHG | RESPIRATION RATE: 18 BRPM | WEIGHT: 207 LBS | DIASTOLIC BLOOD PRESSURE: 85 MMHG | HEART RATE: 87 BPM | HEIGHT: 65 IN

## 2025-06-27 DIAGNOSIS — Z95.828 PORT-A-CATH IN PLACE: Primary | ICD-10-CM

## 2025-06-27 DIAGNOSIS — D80.1 COMMON VARIABLE AGAMMAGLOBULINEMIA: ICD-10-CM

## 2025-06-27 PROCEDURE — 25010000002 HEPARIN LOCK FLUSH PER 10 UNITS: Performed by: ALLERGY & IMMUNOLOGY

## 2025-06-27 PROCEDURE — 25010000002 IMMUNE GLOBULIN (HUMAN) 10 GM/100ML SOLUTION: Performed by: ALLERGY & IMMUNOLOGY

## 2025-06-27 PROCEDURE — 63710000001 DIPHENHYDRAMINE PER 50 MG: Performed by: ALLERGY & IMMUNOLOGY

## 2025-06-27 PROCEDURE — 63710000001 PREDNISONE PER 1 MG: Performed by: ALLERGY & IMMUNOLOGY

## 2025-06-27 PROCEDURE — 25010000002 IMMUNE GLOBULIN (HUMAN) 20 GM/200ML SOLUTION: Performed by: ALLERGY & IMMUNOLOGY

## 2025-06-27 PROCEDURE — 63710000001 ACETAMINOPHEN 325 MG TABLET: Performed by: ALLERGY & IMMUNOLOGY

## 2025-06-27 PROCEDURE — 25810000003 SODIUM CHLORIDE 0.9 % SOLUTION: Performed by: ALLERGY & IMMUNOLOGY

## 2025-06-27 PROCEDURE — A9270 NON-COVERED ITEM OR SERVICE: HCPCS | Performed by: ALLERGY & IMMUNOLOGY

## 2025-06-27 PROCEDURE — 96365 THER/PROPH/DIAG IV INF INIT: CPT

## 2025-06-27 PROCEDURE — 96366 THER/PROPH/DIAG IV INF ADDON: CPT

## 2025-06-27 RX ORDER — ACETAMINOPHEN 325 MG/1
325 TABLET ORAL ONCE
Status: DISCONTINUED | OUTPATIENT
Start: 2025-06-27 | End: 2025-06-28 | Stop reason: HOSPADM

## 2025-06-27 RX ORDER — PREDNISONE 20 MG/1
40 TABLET ORAL ONCE
Start: 2025-07-11 | End: 2025-07-11

## 2025-06-27 RX ORDER — HEPARIN SODIUM (PORCINE) LOCK FLUSH IV SOLN 100 UNIT/ML 100 UNIT/ML
500 SOLUTION INTRAVENOUS AS NEEDED
OUTPATIENT
Start: 2025-06-27

## 2025-06-27 RX ORDER — SODIUM CHLORIDE 0.9 % (FLUSH) 0.9 %
10 SYRINGE (ML) INJECTION AS NEEDED
OUTPATIENT
Start: 2025-06-27

## 2025-06-27 RX ORDER — DIPHENHYDRAMINE HCL 25 MG
50 CAPSULE ORAL ONCE
Status: DISCONTINUED | OUTPATIENT
Start: 2025-06-27 | End: 2025-06-28 | Stop reason: HOSPADM

## 2025-06-27 RX ORDER — SODIUM CHLORIDE 0.9 % (FLUSH) 0.9 %
10 SYRINGE (ML) INJECTION AS NEEDED
Status: DISCONTINUED | OUTPATIENT
Start: 2025-06-27 | End: 2025-06-28 | Stop reason: HOSPADM

## 2025-06-27 RX ORDER — ACETAMINOPHEN 325 MG/1
325 TABLET ORAL ONCE
OUTPATIENT
Start: 2025-07-11

## 2025-06-27 RX ORDER — PREDNISONE 20 MG/1
40 TABLET ORAL ONCE
Status: DISCONTINUED | OUTPATIENT
Start: 2025-06-27 | End: 2025-06-28 | Stop reason: HOSPADM

## 2025-06-27 RX ORDER — PREDNISONE 20 MG/1
40 TABLET ORAL ONCE AS NEEDED
Start: 2025-07-11

## 2025-06-27 RX ORDER — METHYLPREDNISOLONE SODIUM SUCCINATE 125 MG/2ML
50 INJECTION, POWDER, LYOPHILIZED, FOR SOLUTION INTRAMUSCULAR; INTRAVENOUS ONCE AS NEEDED
Start: 2025-07-11

## 2025-06-27 RX ORDER — DIPHENHYDRAMINE HCL 25 MG
50 CAPSULE ORAL ONCE
Start: 2025-07-11 | End: 2025-07-11

## 2025-06-27 RX ORDER — HEPARIN SODIUM (PORCINE) LOCK FLUSH IV SOLN 100 UNIT/ML 100 UNIT/ML
500 SOLUTION INTRAVENOUS AS NEEDED
Status: DISCONTINUED | OUTPATIENT
Start: 2025-06-27 | End: 2025-06-28 | Stop reason: HOSPADM

## 2025-06-27 RX ORDER — DIPHENHYDRAMINE HCL 25 MG
50 CAPSULE ORAL ONCE AS NEEDED
Start: 2025-07-11

## 2025-06-27 RX ORDER — EPINEPHRINE 1 MG/ML
0.3 INJECTION, SOLUTION INTRAMUSCULAR; SUBCUTANEOUS ONCE AS NEEDED
Start: 2025-07-11

## 2025-06-27 RX ADMIN — Medication 10 ML: at 13:27

## 2025-06-27 RX ADMIN — IMMUNE GLOBULIN INFUSION (HUMAN) 20 G: 100 INJECTION, SOLUTION INTRAVENOUS; SUBCUTANEOUS at 11:56

## 2025-06-27 RX ADMIN — IMMUNE GLOBULIN INFUSION (HUMAN) 10 G: 100 INJECTION, SOLUTION INTRAVENOUS; SUBCUTANEOUS at 09:30

## 2025-06-27 RX ADMIN — IMMUNE GLOBULIN INFUSION (HUMAN) 20 G: 100 INJECTION, SOLUTION INTRAVENOUS; SUBCUTANEOUS at 10:34

## 2025-06-27 RX ADMIN — SODIUM CHLORIDE 250 ML: 900 INJECTION, SOLUTION INTRAVENOUS at 09:14

## 2025-06-27 RX ADMIN — Medication 500 UNITS: at 13:27

## 2025-06-30 DIAGNOSIS — M25.552 LEFT HIP PAIN: ICD-10-CM

## 2025-06-30 RX ORDER — ACETAMINOPHEN AND CODEINE PHOSPHATE 300; 30 MG/1; MG/1
1 TABLET ORAL EVERY 4 HOURS PRN
Qty: 15 TABLET | Refills: 1 | Status: CANCELLED | OUTPATIENT
Start: 2025-06-30

## 2025-07-01 DIAGNOSIS — M25.552 LEFT HIP PAIN: ICD-10-CM

## 2025-07-01 DIAGNOSIS — M54.50 ACUTE LOW BACK PAIN, UNSPECIFIED BACK PAIN LATERALITY, UNSPECIFIED WHETHER SCIATICA PRESENT: ICD-10-CM

## 2025-07-01 RX ORDER — ACETAMINOPHEN AND CODEINE PHOSPHATE 300; 30 MG/1; MG/1
1 TABLET ORAL EVERY 4 HOURS PRN
Qty: 14 TABLET | Refills: 0 | Status: ON HOLD | OUTPATIENT
Start: 2025-07-01

## 2025-07-03 DIAGNOSIS — M54.42 CHRONIC BILATERAL LOW BACK PAIN WITH BILATERAL SCIATICA: Primary | ICD-10-CM

## 2025-07-03 DIAGNOSIS — M54.41 CHRONIC BILATERAL LOW BACK PAIN WITH BILATERAL SCIATICA: Primary | ICD-10-CM

## 2025-07-03 DIAGNOSIS — G89.29 CHRONIC BILATERAL LOW BACK PAIN WITH BILATERAL SCIATICA: Primary | ICD-10-CM

## 2025-07-04 ENCOUNTER — APPOINTMENT (OUTPATIENT)
Dept: CT IMAGING | Facility: HOSPITAL | Age: 70
DRG: 552 | End: 2025-07-04
Payer: MEDICARE

## 2025-07-04 ENCOUNTER — APPOINTMENT (OUTPATIENT)
Dept: MRI IMAGING | Facility: HOSPITAL | Age: 70
DRG: 552 | End: 2025-07-04
Payer: MEDICARE

## 2025-07-04 ENCOUNTER — HOSPITAL ENCOUNTER (EMERGENCY)
Facility: HOSPITAL | Age: 70
Discharge: OTHER INSTITUTION | DRG: 552 | End: 2025-07-04
Attending: EMERGENCY MEDICINE
Payer: MEDICARE

## 2025-07-04 ENCOUNTER — HOSPITAL ENCOUNTER (INPATIENT)
Facility: HOSPITAL | Age: 70
LOS: 7 days | Discharge: HOME | DRG: 552 | End: 2025-07-11
Attending: INTERNAL MEDICINE | Admitting: INTERNAL MEDICINE
Payer: MEDICARE

## 2025-07-04 VITALS
DIASTOLIC BLOOD PRESSURE: 78 MMHG | WEIGHT: 207.01 LBS | BODY MASS INDEX: 34.49 KG/M2 | TEMPERATURE: 98 F | RESPIRATION RATE: 16 BRPM | HEIGHT: 65 IN | OXYGEN SATURATION: 95 % | SYSTOLIC BLOOD PRESSURE: 153 MMHG | HEART RATE: 93 BPM

## 2025-07-04 DIAGNOSIS — R15.9 INCONTINENCE OF FECES, UNSPECIFIED FECAL INCONTINENCE TYPE: ICD-10-CM

## 2025-07-04 DIAGNOSIS — R33.9 URINARY RETENTION: ICD-10-CM

## 2025-07-04 DIAGNOSIS — E87.1 HYPONATREMIA: ICD-10-CM

## 2025-07-04 DIAGNOSIS — M51.369 DEGENERATION OF INTERVERTEBRAL DISC OF LUMBAR REGION, UNSPECIFIED WHETHER PAIN PRESENT: ICD-10-CM

## 2025-07-04 DIAGNOSIS — M48.061 SPINAL STENOSIS OF LUMBAR REGION WITHOUT NEUROGENIC CLAUDICATION: Primary | ICD-10-CM

## 2025-07-04 DIAGNOSIS — M54.50 LOW BACK PAIN, UNSPECIFIED BACK PAIN LATERALITY, UNSPECIFIED CHRONICITY, UNSPECIFIED WHETHER SCIATICA PRESENT: Primary | ICD-10-CM

## 2025-07-04 DIAGNOSIS — M51.369 BULGING LUMBAR DISC: ICD-10-CM

## 2025-07-04 DIAGNOSIS — N39.0 URINARY TRACT INFECTION WITHOUT HEMATURIA, SITE UNSPECIFIED: ICD-10-CM

## 2025-07-04 DIAGNOSIS — R32 URINARY INCONTINENCE, UNSPECIFIED TYPE: ICD-10-CM

## 2025-07-04 PROBLEM — M48.00 SPINAL STENOSIS: Status: ACTIVE | Noted: 2025-07-04

## 2025-07-04 LAB
ALBUMIN SERPL-MCNC: 3.8 G/DL (ref 3.5–5.2)
ALBUMIN/GLOB SERPL: 1.2 G/DL
ALP SERPL-CCNC: 44 U/L (ref 39–117)
ALT SERPL W P-5'-P-CCNC: 18 U/L (ref 1–33)
ANION GAP SERPL CALCULATED.3IONS-SCNC: 11.2 MMOL/L (ref 5–15)
AST SERPL-CCNC: 30 U/L (ref 1–32)
BACTERIA UR QL AUTO: ABNORMAL /HPF
BASOPHILS # BLD AUTO: 0.03 10*3/MM3 (ref 0–0.2)
BASOPHILS NFR BLD AUTO: 0.4 % (ref 0–1.5)
BILIRUB SERPL-MCNC: 0.4 MG/DL (ref 0–1.2)
BILIRUB UR QL STRIP: NEGATIVE
BUN SERPL-MCNC: 15 MG/DL (ref 8–23)
BUN/CREAT SERPL: 15.8 (ref 7–25)
CALCIUM SPEC-SCNC: 8.9 MG/DL (ref 8.6–10.5)
CHLORIDE SERPL-SCNC: 93 MMOL/L (ref 98–107)
CLARITY UR: CLEAR
CO2 SERPL-SCNC: 22.8 MMOL/L (ref 22–29)
COLOR UR: YELLOW
CREAT SERPL-MCNC: 0.95 MG/DL (ref 0.57–1)
DEPRECATED RDW RBC AUTO: 44.4 FL (ref 37–54)
EGFRCR SERPLBLD CKD-EPI 2021: 65 ML/MIN/1.73
EOSINOPHIL # BLD AUTO: 0 10*3/MM3 (ref 0–0.4)
EOSINOPHIL NFR BLD AUTO: 0 % (ref 0.3–6.2)
ERYTHROCYTE [DISTWIDTH] IN BLOOD BY AUTOMATED COUNT: 12.9 % (ref 12.3–15.4)
GLOBULIN UR ELPH-MCNC: 3.2 GM/DL
GLUCOSE SERPL-MCNC: 130 MG/DL (ref 65–99)
GLUCOSE UR STRIP-MCNC: NEGATIVE MG/DL
HCT VFR BLD AUTO: 35.4 % (ref 34–46.6)
HGB BLD-MCNC: 12.1 G/DL (ref 12–15.9)
HGB UR QL STRIP.AUTO: ABNORMAL
HYALINE CASTS UR QL AUTO: ABNORMAL /LPF
IMM GRANULOCYTES # BLD AUTO: 0.04 10*3/MM3 (ref 0–0.05)
IMM GRANULOCYTES NFR BLD AUTO: 0.6 % (ref 0–0.5)
KETONES UR QL STRIP: NEGATIVE
LEUKOCYTE ESTERASE UR QL STRIP.AUTO: ABNORMAL
LYMPHOCYTES # BLD AUTO: 0.92 10*3/MM3 (ref 0.7–3.1)
LYMPHOCYTES NFR BLD AUTO: 13.5 % (ref 19.6–45.3)
MCH RBC QN AUTO: 32 PG (ref 26.6–33)
MCHC RBC AUTO-ENTMCNC: 34.2 G/DL (ref 31.5–35.7)
MCV RBC AUTO: 93.7 FL (ref 79–97)
MONOCYTES # BLD AUTO: 0.51 10*3/MM3 (ref 0.1–0.9)
MONOCYTES NFR BLD AUTO: 7.5 % (ref 5–12)
NEUTROPHILS NFR BLD AUTO: 5.31 10*3/MM3 (ref 1.7–7)
NEUTROPHILS NFR BLD AUTO: 78 % (ref 42.7–76)
NITRITE UR QL STRIP: NEGATIVE
NRBC BLD AUTO-RTO: 0 /100 WBC (ref 0–0.2)
PH UR STRIP.AUTO: 6.5 [PH] (ref 5–8)
PLATELET # BLD AUTO: 211 10*3/MM3 (ref 140–450)
PMV BLD AUTO: 8.5 FL (ref 6–12)
POTASSIUM SERPL-SCNC: 4.5 MMOL/L (ref 3.5–5.2)
PROT SERPL-MCNC: 7 G/DL (ref 6–8.5)
PROT UR QL STRIP: NEGATIVE
RBC # BLD AUTO: 3.78 10*6/MM3 (ref 3.77–5.28)
RBC # UR STRIP: ABNORMAL /HPF
REF LAB TEST METHOD: ABNORMAL
SODIUM SERPL-SCNC: 127 MMOL/L (ref 136–145)
SP GR UR STRIP: 1.01 (ref 1–1.03)
SQUAMOUS #/AREA URNS HPF: ABNORMAL /HPF
UROBILINOGEN UR QL STRIP: ABNORMAL
WBC # UR STRIP: ABNORMAL /HPF
WBC NRBC COR # BLD AUTO: 6.81 10*3/MM3 (ref 3.4–10.8)

## 2025-07-04 PROCEDURE — 96361 HYDRATE IV INFUSION ADD-ON: CPT

## 2025-07-04 PROCEDURE — 25810000003 SODIUM CHLORIDE 0.9 % SOLUTION

## 2025-07-04 PROCEDURE — 25010000002 ONDANSETRON PER 1 MG: Performed by: EMERGENCY MEDICINE

## 2025-07-04 PROCEDURE — 82948 REAGENT STRIP/BLOOD GLUCOSE: CPT

## 2025-07-04 PROCEDURE — 25010000002 HYDROMORPHONE 1 MG/ML SOLUTION: Performed by: EMERGENCY MEDICINE

## 2025-07-04 PROCEDURE — 87186 SC STD MICRODIL/AGAR DIL: CPT

## 2025-07-04 PROCEDURE — 74177 CT ABD & PELVIS W/CONTRAST: CPT

## 2025-07-04 PROCEDURE — 87086 URINE CULTURE/COLONY COUNT: CPT

## 2025-07-04 PROCEDURE — 96374 THER/PROPH/DIAG INJ IV PUSH: CPT

## 2025-07-04 PROCEDURE — 99285 EMERGENCY DEPT VISIT HI MDM: CPT | Performed by: EMERGENCY MEDICINE

## 2025-07-04 PROCEDURE — 72131 CT LUMBAR SPINE W/O DYE: CPT

## 2025-07-04 PROCEDURE — 99223 1ST HOSP IP/OBS HIGH 75: CPT | Performed by: STUDENT IN AN ORGANIZED HEALTH CARE EDUCATION/TRAINING PROGRAM

## 2025-07-04 PROCEDURE — 25010000002 MORPHINE PER 10 MG: Performed by: EMERGENCY MEDICINE

## 2025-07-04 PROCEDURE — 25010000002 HYDROMORPHONE PER 4 MG: Performed by: STUDENT IN AN ORGANIZED HEALTH CARE EDUCATION/TRAINING PROGRAM

## 2025-07-04 PROCEDURE — 87077 CULTURE AEROBIC IDENTIFY: CPT

## 2025-07-04 PROCEDURE — 72148 MRI LUMBAR SPINE W/O DYE: CPT

## 2025-07-04 PROCEDURE — 25510000001 IOPAMIDOL 61 % SOLUTION: Performed by: EMERGENCY MEDICINE

## 2025-07-04 PROCEDURE — 80053 COMPREHEN METABOLIC PANEL: CPT

## 2025-07-04 PROCEDURE — 96375 TX/PRO/DX INJ NEW DRUG ADDON: CPT

## 2025-07-04 PROCEDURE — 25010000002 DEXAMETHASONE PER 1 MG: Performed by: STUDENT IN AN ORGANIZED HEALTH CARE EDUCATION/TRAINING PROGRAM

## 2025-07-04 PROCEDURE — 85025 COMPLETE CBC W/AUTO DIFF WBC: CPT

## 2025-07-04 PROCEDURE — 25010000002 HEPARIN (PORCINE) PER 1000 UNITS: Performed by: STUDENT IN AN ORGANIZED HEALTH CARE EDUCATION/TRAINING PROGRAM

## 2025-07-04 PROCEDURE — 81001 URINALYSIS AUTO W/SCOPE: CPT

## 2025-07-04 RX ORDER — MORPHINE SULFATE 2 MG/ML
1 INJECTION, SOLUTION INTRAMUSCULAR; INTRAVENOUS EVERY 4 HOURS PRN
Status: DISCONTINUED | OUTPATIENT
Start: 2025-07-04 | End: 2025-07-11 | Stop reason: HOSPADM

## 2025-07-04 RX ORDER — BISACODYL 10 MG
10 SUPPOSITORY, RECTAL RECTAL DAILY PRN
Status: DISCONTINUED | OUTPATIENT
Start: 2025-07-04 | End: 2025-07-11 | Stop reason: HOSPADM

## 2025-07-04 RX ORDER — ALBUTEROL SULFATE 0.83 MG/ML
2.5 SOLUTION RESPIRATORY (INHALATION) EVERY 6 HOURS PRN
Status: DISCONTINUED | OUTPATIENT
Start: 2025-07-04 | End: 2025-07-11 | Stop reason: HOSPADM

## 2025-07-04 RX ORDER — CETIRIZINE HYDROCHLORIDE 10 MG/1
5 TABLET ORAL DAILY
Status: DISCONTINUED | OUTPATIENT
Start: 2025-07-05 | End: 2025-07-11 | Stop reason: HOSPADM

## 2025-07-04 RX ORDER — LEVOTHYROXINE SODIUM 50 UG/1
50 TABLET ORAL DAILY
Status: DISCONTINUED | OUTPATIENT
Start: 2025-07-05 | End: 2025-07-11 | Stop reason: HOSPADM

## 2025-07-04 RX ORDER — ACETAMINOPHEN 160 MG/5ML
650 SOLUTION ORAL EVERY 4 HOURS PRN
Status: DISCONTINUED | OUTPATIENT
Start: 2025-07-04 | End: 2025-07-11 | Stop reason: HOSPADM

## 2025-07-04 RX ORDER — DEXTROSE MONOHYDRATE 25 G/50ML
25 INJECTION, SOLUTION INTRAVENOUS
Status: DISCONTINUED | OUTPATIENT
Start: 2025-07-04 | End: 2025-07-11 | Stop reason: HOSPADM

## 2025-07-04 RX ORDER — CIPROFLOXACIN 500 MG/1
500 TABLET, FILM COATED ORAL ONCE
Status: COMPLETED | OUTPATIENT
Start: 2025-07-04 | End: 2025-07-04

## 2025-07-04 RX ORDER — LANOLIN ALCOHOL/MO/W.PET/CERES
1000 CREAM (GRAM) TOPICAL DAILY
Status: DISCONTINUED | OUTPATIENT
Start: 2025-07-05 | End: 2025-07-11 | Stop reason: HOSPADM

## 2025-07-04 RX ORDER — MONTELUKAST SODIUM 10 MG/1
10 TABLET ORAL NIGHTLY
Status: DISCONTINUED | OUTPATIENT
Start: 2025-07-04 | End: 2025-07-11 | Stop reason: HOSPADM

## 2025-07-04 RX ORDER — CHOLECALCIFEROL (VITAMIN D3) 25 MCG
1000 TABLET ORAL DAILY
Status: DISCONTINUED | OUTPATIENT
Start: 2025-07-05 | End: 2025-07-11 | Stop reason: HOSPADM

## 2025-07-04 RX ORDER — HEPARIN SODIUM 5000 [USP'U]/ML
5000 INJECTION, SOLUTION INTRAVENOUS; SUBCUTANEOUS EVERY 8 HOURS SCHEDULED
Status: DISCONTINUED | OUTPATIENT
Start: 2025-07-04 | End: 2025-07-10

## 2025-07-04 RX ORDER — ACETAMINOPHEN 650 MG/1
650 SUPPOSITORY RECTAL EVERY 4 HOURS PRN
Status: DISCONTINUED | OUTPATIENT
Start: 2025-07-04 | End: 2025-07-11 | Stop reason: HOSPADM

## 2025-07-04 RX ORDER — BISACODYL 5 MG/1
5 TABLET, DELAYED RELEASE ORAL DAILY PRN
Status: DISCONTINUED | OUTPATIENT
Start: 2025-07-04 | End: 2025-07-11 | Stop reason: HOSPADM

## 2025-07-04 RX ORDER — ALLOPURINOL 100 MG/1
100 TABLET ORAL DAILY
Status: DISCONTINUED | OUTPATIENT
Start: 2025-07-05 | End: 2025-07-11 | Stop reason: HOSPADM

## 2025-07-04 RX ORDER — POLYETHYLENE GLYCOL 3350 17 G/17G
17 POWDER, FOR SOLUTION ORAL DAILY PRN
Status: DISCONTINUED | OUTPATIENT
Start: 2025-07-04 | End: 2025-07-11 | Stop reason: HOSPADM

## 2025-07-04 RX ORDER — ISOSORBIDE MONONITRATE 30 MG/1
30 TABLET, EXTENDED RELEASE ORAL EVERY MORNING
Status: DISCONTINUED | OUTPATIENT
Start: 2025-07-05 | End: 2025-07-11 | Stop reason: HOSPADM

## 2025-07-04 RX ORDER — FUROSEMIDE 40 MG/1
40 TABLET ORAL DAILY PRN
Status: DISCONTINUED | OUTPATIENT
Start: 2025-07-04 | End: 2025-07-11 | Stop reason: HOSPADM

## 2025-07-04 RX ORDER — MORPHINE SULFATE 2 MG/ML
2 INJECTION, SOLUTION INTRAMUSCULAR; INTRAVENOUS ONCE
Status: COMPLETED | OUTPATIENT
Start: 2025-07-04 | End: 2025-07-04

## 2025-07-04 RX ORDER — ARFORMOTEROL TARTRATE 15 UG/2ML
15 SOLUTION RESPIRATORY (INHALATION)
Status: DISCONTINUED | OUTPATIENT
Start: 2025-07-04 | End: 2025-07-11 | Stop reason: HOSPADM

## 2025-07-04 RX ORDER — NALOXONE HCL 0.4 MG/ML
0.4 VIAL (ML) INJECTION
Status: DISCONTINUED | OUTPATIENT
Start: 2025-07-04 | End: 2025-07-11 | Stop reason: HOSPADM

## 2025-07-04 RX ORDER — BUPROPION HYDROCHLORIDE 150 MG/1
150 TABLET, EXTENDED RELEASE ORAL 2 TIMES DAILY
Status: DISCONTINUED | OUTPATIENT
Start: 2025-07-04 | End: 2025-07-09

## 2025-07-04 RX ORDER — DULOXETIN HYDROCHLORIDE 60 MG/1
60 CAPSULE, DELAYED RELEASE ORAL DAILY
Status: DISCONTINUED | OUTPATIENT
Start: 2025-07-05 | End: 2025-07-11 | Stop reason: HOSPADM

## 2025-07-04 RX ORDER — NALOXONE HCL 0.4 MG/ML
0.4 VIAL (ML) INJECTION
Status: DISCONTINUED | OUTPATIENT
Start: 2025-07-04 | End: 2025-07-05

## 2025-07-04 RX ORDER — SODIUM CHLORIDE 0.9 % (FLUSH) 0.9 %
10 SYRINGE (ML) INJECTION EVERY 12 HOURS SCHEDULED
Status: DISCONTINUED | OUTPATIENT
Start: 2025-07-04 | End: 2025-07-11 | Stop reason: HOSPADM

## 2025-07-04 RX ORDER — SODIUM CHLORIDE 0.9 % (FLUSH) 0.9 %
10 SYRINGE (ML) INJECTION AS NEEDED
Status: DISCONTINUED | OUTPATIENT
Start: 2025-07-04 | End: 2025-07-11 | Stop reason: HOSPADM

## 2025-07-04 RX ORDER — BUDESONIDE 0.5 MG/2ML
0.5 INHALANT ORAL
Status: DISCONTINUED | OUTPATIENT
Start: 2025-07-04 | End: 2025-07-11 | Stop reason: HOSPADM

## 2025-07-04 RX ORDER — AMOXICILLIN 250 MG
2 CAPSULE ORAL 2 TIMES DAILY PRN
Status: DISCONTINUED | OUTPATIENT
Start: 2025-07-04 | End: 2025-07-11 | Stop reason: HOSPADM

## 2025-07-04 RX ORDER — FLUTICASONE PROPIONATE 50 MCG
2 SPRAY, SUSPENSION (ML) NASAL DAILY
Status: DISCONTINUED | OUTPATIENT
Start: 2025-07-05 | End: 2025-07-11 | Stop reason: HOSPADM

## 2025-07-04 RX ORDER — NICOTINE POLACRILEX 4 MG
15 LOZENGE BUCCAL
Status: DISCONTINUED | OUTPATIENT
Start: 2025-07-04 | End: 2025-07-11 | Stop reason: HOSPADM

## 2025-07-04 RX ORDER — HYDROCODONE BITARTRATE AND ACETAMINOPHEN 5; 325 MG/1; MG/1
1 TABLET ORAL ONCE
Refills: 0 | Status: COMPLETED | OUTPATIENT
Start: 2025-07-04 | End: 2025-07-04

## 2025-07-04 RX ORDER — PANTOPRAZOLE SODIUM 40 MG/1
40 TABLET, DELAYED RELEASE ORAL
Status: DISCONTINUED | OUTPATIENT
Start: 2025-07-05 | End: 2025-07-11 | Stop reason: HOSPADM

## 2025-07-04 RX ORDER — IBUPROFEN 600 MG/1
1 TABLET ORAL
Status: DISCONTINUED | OUTPATIENT
Start: 2025-07-04 | End: 2025-07-11 | Stop reason: HOSPADM

## 2025-07-04 RX ORDER — MULTIPLE VITAMINS W/ MINERALS TAB 9MG-400MCG
1 TAB ORAL DAILY
Status: DISCONTINUED | OUTPATIENT
Start: 2025-07-05 | End: 2025-07-11 | Stop reason: HOSPADM

## 2025-07-04 RX ORDER — FERROUS SULFATE 325(65) MG
325 TABLET ORAL
Status: DISCONTINUED | OUTPATIENT
Start: 2025-07-05 | End: 2025-07-11 | Stop reason: HOSPADM

## 2025-07-04 RX ORDER — VALSARTAN 160 MG/1
160 TABLET ORAL DAILY
Status: DISCONTINUED | OUTPATIENT
Start: 2025-07-05 | End: 2025-07-11 | Stop reason: HOSPADM

## 2025-07-04 RX ORDER — ONDANSETRON 2 MG/ML
4 INJECTION INTRAMUSCULAR; INTRAVENOUS ONCE
Status: COMPLETED | OUTPATIENT
Start: 2025-07-04 | End: 2025-07-04

## 2025-07-04 RX ORDER — DEXAMETHASONE SODIUM PHOSPHATE 4 MG/ML
4 INJECTION, SOLUTION INTRA-ARTICULAR; INTRALESIONAL; INTRAMUSCULAR; INTRAVENOUS; SOFT TISSUE EVERY 6 HOURS SCHEDULED
Status: DISCONTINUED | OUTPATIENT
Start: 2025-07-04 | End: 2025-07-05

## 2025-07-04 RX ORDER — SODIUM CHLORIDE 9 MG/ML
40 INJECTION, SOLUTION INTRAVENOUS AS NEEDED
Status: DISCONTINUED | OUTPATIENT
Start: 2025-07-04 | End: 2025-07-11 | Stop reason: HOSPADM

## 2025-07-04 RX ORDER — IOPAMIDOL 612 MG/ML
100 INJECTION, SOLUTION INTRAVASCULAR
Status: COMPLETED | OUTPATIENT
Start: 2025-07-04 | End: 2025-07-04

## 2025-07-04 RX ORDER — HYDROMORPHONE HYDROCHLORIDE 1 MG/ML
0.5 INJECTION, SOLUTION INTRAMUSCULAR; INTRAVENOUS; SUBCUTANEOUS
Refills: 0 | Status: DISCONTINUED | OUTPATIENT
Start: 2025-07-04 | End: 2025-07-05

## 2025-07-04 RX ORDER — ACETAMINOPHEN 325 MG/1
650 TABLET ORAL EVERY 4 HOURS PRN
Status: DISCONTINUED | OUTPATIENT
Start: 2025-07-04 | End: 2025-07-11 | Stop reason: HOSPADM

## 2025-07-04 RX ADMIN — BUPROPION HYDROCHLORIDE 150 MG: 150 TABLET, FILM COATED, EXTENDED RELEASE ORAL at 23:51

## 2025-07-04 RX ADMIN — DEXAMETHASONE SODIUM PHOSPHATE 4 MG: 4 INJECTION, SOLUTION INTRA-ARTICULAR; INTRALESIONAL; INTRAMUSCULAR; INTRAVENOUS; SOFT TISSUE at 22:22

## 2025-07-04 RX ADMIN — IOPAMIDOL 100 ML: 612 INJECTION, SOLUTION INTRAVENOUS at 13:17

## 2025-07-04 RX ADMIN — HYDROMORPHONE HYDROCHLORIDE 1 MG: 1 INJECTION, SOLUTION INTRAMUSCULAR; INTRAVENOUS; SUBCUTANEOUS at 12:34

## 2025-07-04 RX ADMIN — ONDANSETRON 4 MG: 2 INJECTION INTRAMUSCULAR; INTRAVENOUS at 12:33

## 2025-07-04 RX ADMIN — SODIUM CHLORIDE 500 ML: 900 INJECTION, SOLUTION INTRAVENOUS at 13:30

## 2025-07-04 RX ADMIN — CIPROFLOXACIN HYDROCHLORIDE 500 MG: 500 TABLET, FILM COATED ORAL at 15:18

## 2025-07-04 RX ADMIN — Medication 10 ML: at 23:52

## 2025-07-04 RX ADMIN — HYDROMORPHONE HYDROCHLORIDE 0.5 MG: 1 INJECTION, SOLUTION INTRAMUSCULAR; INTRAVENOUS; SUBCUTANEOUS at 22:23

## 2025-07-04 RX ADMIN — HEPARIN SODIUM 5000 UNITS: 5000 INJECTION INTRAVENOUS; SUBCUTANEOUS at 23:50

## 2025-07-04 RX ADMIN — MONTELUKAST 10 MG: 10 TABLET, FILM COATED ORAL at 23:51

## 2025-07-04 RX ADMIN — HYDROCODONE BITARTRATE AND ACETAMINOPHEN 1 TABLET: 5; 325 TABLET ORAL at 16:26

## 2025-07-04 RX ADMIN — MORPHINE SULFATE 2 MG: 2 INJECTION, SOLUTION INTRAMUSCULAR; INTRAVENOUS at 18:50

## 2025-07-04 NOTE — ED NOTES
Kaela with [facility] calling back with the hospitalist on the line to speak to NADEGE Richardson. Call transferred at this time.

## 2025-07-04 NOTE — ED PROVIDER NOTES
Subjective  History of Present Illness:    Patient is a 69-year-old female presented for evaluation of low back pain, patient reports that she recently had an MRI of her back within the last month or so, this showed multilevel degenerative changes with canal stenosis and neuroforaminal narrowing most pronounced at L2-L3 L3-L4 and L4-L5.  She reports that she has received Tylenol 3's from her primary care, receives SI joint injections through [facility].  She is supposed to follow-up for an epidural but does not feel that she can wait that long due to increasing pain.  She has had a longstanding history of chronic back pain, she also reports a history of some incontinence from overactive bladder, which has increased in the last couple of days.  Denies any saddle anesthesias, denies any urinary retention reports that she is voiding normally, she does report a couple episodes of fecal incontinence.  She denies any numbness or tingling, she does report pain shooting down both legs to the feet bilaterally.  No deep venous system tenderness.  Denies any falls or traumatic injuries, no fevers, no nausea or vomiting, denies any urinary symptoms.  Does have a history of diabetes.  She reports that ambulating is becoming increasingly difficult due to the pain.  She is still able to ambulate with her cane at home      Nurses Notes reviewed and agree, including vitals, allergies, social history and prior medical history.     REVIEW OF SYSTEMS: All systems reviewed and not pertinent unless noted.  Review of Systems   Constitutional:  Negative for fever.   Gastrointestinal:  Negative for nausea and vomiting.   Genitourinary:  Negative for dysuria, frequency, hematuria and urgency.   Musculoskeletal:  Positive for back pain.   Neurological:  Negative for numbness.   All other systems reviewed and are negative.      Past Medical History:   Diagnosis Date    Anxiety     Aortic valve stenosis     Cardiac murmur      Cataract, bilateral     CHF (congestive heart failure)     Cholelithiasis GB out    Chronic kidney disease     Stage III    Clostridium difficile infection     in her 30s    Colon polyp Ever since having colonoscopies.    Common variable immunodeficiency     IVIG infusions    Compression fracture of lumbar spine, non-traumatic     COPD (chronic obstructive pulmonary disease)     Coronary artery disease     Prinz metal angina & aortic stenosis    Depression     Diabetes mellitus     type II    Eosinophilic asthma     Fibromyalgia, primary     Full dentures     GERD (gastroesophageal reflux disease)     History of transfusion     in [facility] at [facility] in [place].  No reaction noted, patient states she does have an antibody from transfusion.    Hypertension     Hypogammaglobulinemia     Hypothyroidism     Irritable bowel syndrome     Migraines     Morbid obesity     Optic neuritis     Optic neuropathy     Osteoarthritis     Prinzmetal angina 1990    Pseudomonas infection 1998    lungs    PTSD (post-traumatic stress disorder)     Pulmonary edema     Renal insufficiency     Sinus tachycardia 1990    Sleep apnea     no longer uses/needs cpap    Stroke     TIA about 7 years ago    Tachycardia     TIA (transient ischemic attack) 2017    Trochanteric bursitis 01/25/2023       Allergies:    Cefaclor, Cephalexin, Cephalosporins, Escitalopram oxalate, Ambien [zolpidem tartrate], Cardizem [diltiazem hcl], Metoclopramide, Mobic [meloxicam], Penicillins, Sumatriptan, Topamax [topiramate], Verapamil, Zolpidem, Amoxicillin, Edecrin [ethacrynic acid], Hydrochlorothiazide, and Sulfa antibiotics      Past Surgical History:   Procedure Laterality Date    APPENDECTOMY      BUNIONECTOMY Bilateral     CARDIAC CATHETERIZATION  2017    no stents needed    CARPAL TUNNEL RELEASE Right     COLONOSCOPY  2021    ENDOMETRIAL ABLATION  1997    EYE SURGERY  ? About 6-8 years ago    Laser for glaucoma    FRACTURE SURGERY  1995    No hardware; Tibeal  plateau    GASTRIC BYPASS      HAND SURGERY Left     No hardware    HEMORRHOIDECTOMY N/A 04/09/2024    Procedure: HEMORRHOID BANDING X2;  Surgeon: Melissa Beck MD;  Location: [facility] OR;  Service: General;  Laterality: N/A;    INTERSTIM PLACEMENT N/A 05/03/2023    Procedure: INTERSTIM STAGES 1 AND 2 LEAD AND GENERATOR PLACEMENT;  Surgeon: Abner Nation MD;  Location: [facility] OR;  Service: Urology;  Laterality: N/A;    POSTERIOR VAGINAL REPAIR N/A 08/02/2023    Procedure: POSTERIOR COLPORRHAPHY;  Surgeon: Kellen Galan MD;  Location: [facility] OR;  Service: Gynecology;  Laterality: N/A;    RECTAL EXAMINATION UNDER ANESTHESIA N/A 04/09/2024    Procedure: RECTAL EXAM UNDER ANESTHESIA;  Surgeon: Melissa Beck MD;  Location: [facility] OR;  Service: General;  Laterality: N/A;    REPLACEMENT TOTAL KNEE BILATERAL      TEETH EXTRACTION      all of bottom teeth removed    THORACOTOMY      TONSILLECTOMY      TOTAL ABDOMINAL HYSTERECTOMY WITH SALPINGO OOPHORECTOMY      TRACHEAL SURGERY      Repaired via thoracotomy    TUBAL ABDOMINAL LIGATION  1983    VENOUS ACCESS DEVICE (PORT) INSERTION      port a cath in the left chest wall         Social History     Socioeconomic History    Marital status:    Tobacco Use    Smoking status: Never     Passive exposure: Never    Smokeless tobacco: Never   Vaping Use    Vaping status: Never Used   Substance and Sexual Activity    Alcohol use: Yes     Comment: ONCE A YEAR    Drug use: Never    Sexual activity: Defer         Family History   Problem Relation Age of Onset    Diabetes Mother     Stroke Mother     Heart disease Mother     Hypertension Mother     Endometriosis Mother     Depression Mother     Diabetes Father     Hypertension Father     Stroke Father     Heart attack Father     Asthma Father     COPD Father     Dementia Father     Heart disease Father     COPD Sister     Asthma Sister     Cancer Sister         Nasal cell skin    Brain cancer Sister      "Diabetes Sister     Stroke Sister     Heart attack Sister     Endometriosis Sister     Depression Sister     Arthritis Sister         Rheumatoid    Hypertension Sister     Kidney disease Sister     Diabetes Sister     COPD Sister     Asthma Sister     Endometriosis Sister     Depression Sister     Cancer Sister         Glioma    Heart disease Sister         MI    Heart attack Sister     Endometriosis Sister     Asthma Sister     Hypertension Sister     Kidney disease Sister         ESRD    COPD Brother     Asthma Brother         Turned into COPD    Diabetes Brother     Asthma Brother     COPD Brother     Diabetes Brother     Hypertension Brother     Asthma Daughter     Endometriosis Daughter     Osteoarthritis Daughter     Hypertension Daughter     Kidney failure Daughter     Irritable bowel syndrome Daughter     Anxiety disorder Daughter     Bipolar disorder Daughter     Depression Daughter     Self-Injurious Behavior  Daughter     Suicide Attempts Daughter     Mental illness Daughter         Bipolar and eating fisorder    Asthma Daughter     Endometriosis Daughter     Osteoarthritis Daughter     Asthma Son     ADD / ADHD Son     Alcohol abuse Son     Drug abuse Son     Breast cancer Maternal Cousin     Heart disease Maternal Grandfather     Heart disease Maternal Uncle     OCD Neg Hx     Paranoid behavior Neg Hx     Schizophrenia Neg Hx     Seizures Neg Hx     Ovarian cancer Neg Hx        Objective  Physical Exam:  /92   Pulse 93   Temp 98 °F (36.7 °C) (Oral)   Resp 18   Ht 165.1 cm (65\")   Wt 93.9 kg (207 lb 0.2 oz)   SpO2 97%   BMI 34.45 kg/m²      Physical Exam  Vitals and nursing note reviewed.   Constitutional:       General: She is not in acute distress.     Appearance: She is obese. She is not ill-appearing, toxic-appearing or diaphoretic.   HENT:      Head: Normocephalic and atraumatic.      Nose: Nose normal.      Mouth/Throat:      Pharynx: Oropharynx is clear.   Eyes:      Extraocular " Movements: Extraocular movements intact.      Pupils: Pupils are equal, round, and reactive to light.   Cardiovascular:      Rate and Rhythm: Normal rate and regular rhythm.      Heart sounds: Normal heart sounds.   Pulmonary:      Effort: Pulmonary effort is normal. No respiratory distress.      Breath sounds: Normal breath sounds.   Abdominal:      General: There is no distension.      Palpations: Abdomen is soft.      Tenderness: There is no abdominal tenderness. There is no right CVA tenderness, left CVA tenderness or guarding.   Musculoskeletal:         General: Tenderness present. Normal range of motion.      Cervical back: Normal range of motion.   Skin:     General: Skin is warm and dry.      Capillary Refill: Capillary refill takes less than 2 seconds.   Neurological:      General: No focal deficit present.      Mental Status: She is alert and oriented to person, place, and time.      Cranial Nerves: No cranial nerve deficit.      Sensory: No sensory deficit.      Motor: No weakness.      Coordination: Coordination normal.   Psychiatric:         Mood and Affect: Mood normal.         Behavior: Behavior normal.         Thought Content: Thought content normal.         Judgment: Judgment normal.               Procedures    ED Course:    ED Course as of 07/04/25 1448   Fri Jul 04, 2025   1420 Reportedly, urinary retention of 165 cc after voiding.  Symptoms have been ongoing for around 3 days. [JR]      ED Course User Index  [JR] Vahe Butler PA-C       Lab Results (last 24 hours)       Procedure Component Value Units Date/Time    CBC Auto Differential [[id number]]  (Abnormal) Collected: 07/04/25 1227    Specimen: Blood Updated: 07/04/25 1233     WBC 6.81 10*3/mm3      RBC 3.78 10*6/mm3      Hemoglobin 12.1 g/dL      Hematocrit 35.4 %      MCV 93.7 fL      MCH 32.0 pg      MCHC 34.2 g/dL      RDW 12.9 %      RDW-SD 44.4 fl      MPV 8.5 fL      Platelets 211 10*3/mm3      Neutrophil % 78.0 %      Lymphocyte %  13.5 %      Monocyte % 7.5 %      Eosinophil % 0.0 %      Basophil % 0.4 %      Immature Grans % 0.6 %      Neutrophils, Absolute 5.31 10*3/mm3      Lymphocytes, Absolute 0.92 10*3/mm3      Monocytes, Absolute 0.51 10*3/mm3      Eosinophils, Absolute 0.00 10*3/mm3      Basophils, Absolute 0.03 10*3/mm3      Immature Grans, Absolute 0.04 10*3/mm3      nRBC 0.0 /100 WBC     Comprehensive Metabolic Panel [[id number]]  (Abnormal) Collected: 07/04/25 1227    Specimen: Blood Updated: 07/04/25 1251     Glucose 130 mg/dL      BUN 15.0 mg/dL      Creatinine 0.95 mg/dL      Sodium 127 mmol/L      Potassium 4.5 mmol/L      Chloride 93 mmol/L      CO2 22.8 mmol/L      Calcium 8.9 mg/dL      Total Protein 7.0 g/dL      Albumin 3.8 g/dL      ALT (SGPT) 18 U/L      AST (SGOT) 30 U/L      Alkaline Phosphatase 44 U/L      Total Bilirubin 0.4 mg/dL      Globulin 3.2 gm/dL      A/G Ratio 1.2 g/dL      BUN/Creatinine Ratio 15.8     Anion Gap 11.2 mmol/L      eGFR 65.0 mL/min/1.73     Narrative:      GFR Categories in Chronic Kidney Disease (CKD)              GFR Category          GFR (mL/min/1.73)    Interpretation  G1                    90 or greater        Normal or high (1)  G2                    60-89                Mild decrease (1)  G3a                   45-59                Mild to moderate decrease  G3b                   30-44                Moderate to severe decrease  G4                    15-29                Severe decrease  G5                    14 or less           Kidney failure    (1)In the absence of evidence of kidney disease, neither GFR category G1 or G2 fulfill the criteria for CKD.    eGFR calculation 2021 CKD-EPI creatinine equation, which does not include race as a factor    Urinalysis With Culture If Indicated - Urine, Clean Catch [[id number]]  (Abnormal) Collected: 07/04/25 1327    Specimen: Urine, Clean Catch Updated: 07/04/25 1346     Color, UA Yellow     Appearance, UA Clear     pH, UA 6.5     Specific  Gravity, UA 1.015     Glucose, UA Negative     Ketones, UA Negative     Bilirubin, UA Negative     Blood, UA Trace     Protein, UA Negative     Leuk Esterase, UA Small (1+)     Nitrite, UA Negative     Urobilinogen, UA 0.2 E.U./dL    Narrative:      In absence of clinical symptoms, the presence of pyuria, bacteria, and/or nitrites on the urinalysis result does not correlate with infection.    Urinalysis, Microscopic Only - Urine, Clean Catch [[id number]]  (Abnormal) Collected: 07/04/25 1327    Specimen: Urine, Clean Catch Updated: 07/04/25 1353     RBC, UA 0-2 /HPF      WBC, UA 6-10 /HPF      Bacteria, UA Trace /HPF      Squamous Epithelial Cells, UA 3-6 /HPF      Hyaline Casts, UA None Seen /LPF      Methodology Manual Light Microscopy    Urine Culture - Urine, Urine, Clean Catch [[id number]] Collected: 07/04/25 1327    Specimen: Urine, Clean Catch Updated: 07/04/25 1353             CT Abdomen Pelvis With Contrast  Result Date: 7/4/2025  PROCEDURE: CT ABDOMEN PELVIS W CONTRAST-  HISTORY:  Low back pain, urinary incontinence, fecal incontinence, eval distended bladder or bowel  COMPARISON:  None .  TECHNIQUE: Multiple axial CT images were obtained from the lung bases through the pubic symphysis following the administration of Isovue 300 contrast.  FINDINGS:  ABDOMEN: There is a trace right pleural effusion. There is right pleural thickening. Right middle lobe atelectasis is seen. The heart is normal in size. There are postsurgical changes of gastric bypass. The liver is normal. Gallbladder surgically absent. The spleen is unremarkable. No adrenal mass is present.  The pancreas is normal. There is a 3 mm nonobstructing left renal stone. The aorta is normal in caliber. There is no free fluid or adenopathy.  A moderate amount of stool is seen in the transverse colon.  PELVIS: The appendix is not identified. There is sigmoid diverticulosis. The uterus is surgically absent. The urinary bladder is unremarkable. There is no  significant free fluid or adenopathy.      Impression: Right pleural thickening and right middle lobe atelectasis may be inflammatory. Short interval follow-up chest CT is recommended.  Moderate amount of stool in the transverse colon is consistent with constipation.  Left nephrolithiasis without hydronephrosis. .   CTDI: 22.43 mGy DLP: 1130.99 mGy.cm   This study was performed with techniques to keep radiation doses as low as reasonably achievable (ALARA). Individualized dose reduction techniques using automated exposure control or adjustment of mA and/or kV according to the patient size were employed.      Images were reviewed, interpreted, and dictated by Dr. Kwaku Grant MD Transcribed by Leyda Rachel PA-C.  This report was signed and finalized on 7/4/2025 2:01 PM by Kwaku Grant MD.      CT Lumbar Spine Without Contrast  Result Date: 7/4/2025  PROCEDURE: CT LUMBAR SPINE WO CONTRAST-  HISTORY: Low back pain, urinary incontinence, eval fracture  TECHNIQUE: Multiple axial CT images were obtained through the lumbar spine using bone window algorithms. Coronal and sagittal images were reconstructed from the original axial data set.  FINDINGS: There is spaces are moderately diffusely degenerated. There is anterolisthesis of L4 on L5. There is no acute fracture.  There are disc bulges and facet hypertrophy throughout the lumbar spine. This results in canal stenosis and neuroforaminal narrowing throughout the lower lumbar spine.          Impression: Degenerative disc disease.  No acute fracture.     DLP: 1130.99 mGy.cm CTDI: 22.43 mGy   This study was performed with techniques to keep radiation doses as low as reasonably achievable (ALARA). Individualized dose reduction techniques using automated exposure control or adjustment of mA and/or kV according to the patient size were employed.     Images were reviewed, interpreted, and dictated by Dr. Kwaku Grant MD Transcribed by Lyeda Rachel PA-C.   This report was signed and finalized on 7/4/2025 2:01 PM by Kwaku Grant MD.           MDM     Amount and/or Complexity of Data Reviewed  Decide to obtain previous medical records or to obtain history from someone other than the patient: yes        Initial impression of presenting illness: Patient is a 69-year-old female presenting for evaluation of low back pain    DDX: includes but is not limited to: Fracture dislocation strain sprain contusion spinal compression, sciatica, piriformis syndrome, SI joint dysfunction, cauda equina syndrome, UTI, among others    Patient arrives dynamically stable, afebrile, nontachycardic nontachypneic and nonhypoxic on room air with vitals interpreted by myself.     Pertinent features from physical exam: Abdomen soft nontender.  Negative CVA tenderness, midline lower lumbar spinal tenderness, patient has no weakness of the lower extremities with good strength, 2+ pedal pulses, she has sensory intact.  No foot drop.  Nontoxic-appearing, rectal tone exam performed at bedside by myself with chaperone, LEONCIO Kay present at bedside for examination, patient has lack of rectal tone and sphincter strength when asked to bear down.    Initial diagnostic plan: CBC CMP urinalysis CT lumbar spine without contrast CT abdomen pelvis with contrast, postvoid residual    Results from initial plan were reviewed and interpreted by me revealing CBC unremarkable, CMP with glucose of 130, sodium of 127.  Urinalysis with small leukocytes, 6-10 whites, trace bacteria concerning for UTI, urine culture pending.  CT abdomen pelvis radiology IMPRESSION:  Right pleural thickening and right middle lobe atelectasis  may be inflammatory. Short interval follow-up chest CT is recommended.     Moderate amount of stool in the transverse colon is consistent with  constipation.     Left nephrolithiasis without hydronephrosis.    CT lumbar spine radiologyIMPRESSION:  Degenerative disc disease.     No acute  fracture.    Postvoid residual of 165 cc.    Diagnostic information from other sources: Record reviewed    Interventions / Re-evaluation:   Medications   ciprofloxacin (CIPRO) tablet 500 mg (has no administration in time range)   HYDROmorphone (DILAUDID) injection 1 mg (1 mg Intravenous Given 7/4/25 1234)   ondansetron (ZOFRAN) injection 4 mg (4 mg Intravenous Given 7/4/25 1233)   sodium chloride 0.9 % bolus 500 mL (500 mL Intravenous New Bag 7/4/25 1330)   iopamidol (ISOVUE-300) 61 % injection 100 mL (100 mL Intravenous Given 7/4/25 1317)       Results/clinical rationale were discussed with patient at bedside.  Discussed given patient's progression of symptoms with weakness of the legs, having to hold onto objects to walk because of the pain and weakness of the legs, development of progressive urinary incontinence over the last 3 days as well as fecal incontinence over the last 3 days that I recommended she obtain an MRI to rule out cauda equina syndrome.  Unfortunately we do not have that capability here at this hospital on the holiday, and will need to transfer for MRI to another facility.  Patient was in agreement with this plan    Consultations/Discussion of results with other physicians: Discussed with ED attending, discussed with orthopedics, Dr. Lacey, given fecal incontinence, urinary incontinence, lack of rectal tone recommended transfer for MRI.  Discussed with Dr. Gautam, neurosurgery [facility], symptoms ongoing for the last 3 days in regards to her urinary and fecal incontinence, we will discuss with their hospital medicine team for transfer for MRI given lack of MRI coverage here to rule out cauda equina syndrome.  Discussed with Dr. Rizvi hospitalist at [facility] who is agreeable to accept the patient in transfer    Disposition plan: Transfer to [facility].  -----    Final diagnoses:   Low back pain, unspecified back pain laterality, unspecified chronicity,  unspecified whether sciatica present   Incontinence of feces, unspecified fecal incontinence type   Urinary incontinence, unspecified type   Hyponatremia   Urinary retention   Urinary tract infection without hematuria, site unspecified   Bulging lumbar disc   Degeneration of intervertebral disc of lumbar region, unspecified whether pain present          Vahe Butler PA-C  07/04/25 5705

## 2025-07-04 NOTE — ED NOTES
Kaela with [facility] calling back to give us the phone number for the charge nurse for 3[facility] at [facility]. That phone number is [phone].

## 2025-07-04 NOTE — ED NOTES
"At this time, I have contacted [facility] and spoken with Kaela in order to receive an update on bed status. She states that she \"can give me the number to call report\". The number she gave to call report is [phone]. She asks that EMS enter the building through the [facility] entrance and call report to their facility when they are approximately 10 minutes away.   "

## 2025-07-05 ENCOUNTER — APPOINTMENT (OUTPATIENT)
Dept: MRI IMAGING | Facility: HOSPITAL | Age: 70
DRG: 552 | End: 2025-07-05
Payer: MEDICARE

## 2025-07-05 LAB
ANION GAP SERPL CALCULATED.3IONS-SCNC: 11.3 MMOL/L (ref 5–15)
BUN SERPL-MCNC: 10.2 MG/DL (ref 8–23)
BUN/CREAT SERPL: 11.7 (ref 7–25)
CALCIUM SPEC-SCNC: 8.6 MG/DL (ref 8.6–10.5)
CHLORIDE SERPL-SCNC: 96 MMOL/L (ref 98–107)
CK SERPL-CCNC: 135 U/L (ref 20–180)
CO2 SERPL-SCNC: 23.7 MMOL/L (ref 22–29)
CREAT SERPL-MCNC: 0.87 MG/DL (ref 0.57–1)
DEPRECATED RDW RBC AUTO: 44.5 FL (ref 37–54)
EGFRCR SERPLBLD CKD-EPI 2021: 72.2 ML/MIN/1.73
ERYTHROCYTE [DISTWIDTH] IN BLOOD BY AUTOMATED COUNT: 12.7 % (ref 12.3–15.4)
GLUCOSE BLDC GLUCOMTR-MCNC: 122 MG/DL (ref 70–130)
GLUCOSE BLDC GLUCOMTR-MCNC: 136 MG/DL (ref 70–130)
GLUCOSE BLDC GLUCOMTR-MCNC: 168 MG/DL (ref 70–130)
GLUCOSE BLDC GLUCOMTR-MCNC: 177 MG/DL (ref 70–130)
GLUCOSE BLDC GLUCOMTR-MCNC: 217 MG/DL (ref 70–130)
GLUCOSE SERPL-MCNC: 160 MG/DL (ref 65–99)
HCT VFR BLD AUTO: 36.8 % (ref 34–46.6)
HGB BLD-MCNC: 12.5 G/DL (ref 12–15.9)
MAGNESIUM SERPL-MCNC: 2 MG/DL (ref 1.6–2.4)
MCH RBC QN AUTO: 32.6 PG (ref 26.6–33)
MCHC RBC AUTO-ENTMCNC: 34 G/DL (ref 31.5–35.7)
MCV RBC AUTO: 95.8 FL (ref 79–97)
OSMOLALITY SERPL: 281 MOSM/KG (ref 275–295)
OSMOLALITY UR: 329 MOSM/KG (ref 300–1100)
PLATELET # BLD AUTO: 205 10*3/MM3 (ref 140–450)
PMV BLD AUTO: 9.1 FL (ref 6–12)
POTASSIUM SERPL-SCNC: 4.4 MMOL/L (ref 3.5–5.2)
RBC # BLD AUTO: 3.84 10*6/MM3 (ref 3.77–5.28)
SODIUM SERPL-SCNC: 131 MMOL/L (ref 136–145)
SODIUM UR-SCNC: 38 MMOL/L
TSH SERPL DL<=0.05 MIU/L-ACNC: 1.45 UIU/ML (ref 0.27–4.2)
WBC NRBC COR # BLD AUTO: 5.49 10*3/MM3 (ref 3.4–10.8)

## 2025-07-05 PROCEDURE — 72146 MRI CHEST SPINE W/O DYE: CPT

## 2025-07-05 PROCEDURE — 94761 N-INVAS EAR/PLS OXIMETRY MLT: CPT

## 2025-07-05 PROCEDURE — 63710000001 INSULIN REGULAR HUMAN PER 5 UNITS: Performed by: STUDENT IN AN ORGANIZED HEALTH CARE EDUCATION/TRAINING PROGRAM

## 2025-07-05 PROCEDURE — 83930 ASSAY OF BLOOD OSMOLALITY: CPT | Performed by: STUDENT IN AN ORGANIZED HEALTH CARE EDUCATION/TRAINING PROGRAM

## 2025-07-05 PROCEDURE — 97530 THERAPEUTIC ACTIVITIES: CPT

## 2025-07-05 PROCEDURE — 82550 ASSAY OF CK (CPK): CPT | Performed by: STUDENT IN AN ORGANIZED HEALTH CARE EDUCATION/TRAINING PROGRAM

## 2025-07-05 PROCEDURE — 97116 GAIT TRAINING THERAPY: CPT

## 2025-07-05 PROCEDURE — 80048 BASIC METABOLIC PNL TOTAL CA: CPT | Performed by: STUDENT IN AN ORGANIZED HEALTH CARE EDUCATION/TRAINING PROGRAM

## 2025-07-05 PROCEDURE — 97535 SELF CARE MNGMENT TRAINING: CPT

## 2025-07-05 PROCEDURE — 94799 UNLISTED PULMONARY SVC/PX: CPT

## 2025-07-05 PROCEDURE — 82948 REAGENT STRIP/BLOOD GLUCOSE: CPT

## 2025-07-05 PROCEDURE — 25010000002 DEXAMETHASONE PER 1 MG: Performed by: STUDENT IN AN ORGANIZED HEALTH CARE EDUCATION/TRAINING PROGRAM

## 2025-07-05 PROCEDURE — 97110 THERAPEUTIC EXERCISES: CPT

## 2025-07-05 PROCEDURE — 97166 OT EVAL MOD COMPLEX 45 MIN: CPT

## 2025-07-05 PROCEDURE — 25010000002 HYDROMORPHONE PER 4 MG: Performed by: STUDENT IN AN ORGANIZED HEALTH CARE EDUCATION/TRAINING PROGRAM

## 2025-07-05 PROCEDURE — 97162 PT EVAL MOD COMPLEX 30 MIN: CPT

## 2025-07-05 PROCEDURE — 63710000001 REVEFENACIN 175 MCG/3ML SOLUTION: Performed by: STUDENT IN AN ORGANIZED HEALTH CARE EDUCATION/TRAINING PROGRAM

## 2025-07-05 PROCEDURE — 84300 ASSAY OF URINE SODIUM: CPT | Performed by: STUDENT IN AN ORGANIZED HEALTH CARE EDUCATION/TRAINING PROGRAM

## 2025-07-05 PROCEDURE — 25010000002 HEPARIN (PORCINE) PER 1000 UNITS: Performed by: STUDENT IN AN ORGANIZED HEALTH CARE EDUCATION/TRAINING PROGRAM

## 2025-07-05 PROCEDURE — 99232 SBSQ HOSP IP/OBS MODERATE 35: CPT | Performed by: INTERNAL MEDICINE

## 2025-07-05 PROCEDURE — 84443 ASSAY THYROID STIM HORMONE: CPT | Performed by: STUDENT IN AN ORGANIZED HEALTH CARE EDUCATION/TRAINING PROGRAM

## 2025-07-05 PROCEDURE — 83735 ASSAY OF MAGNESIUM: CPT | Performed by: STUDENT IN AN ORGANIZED HEALTH CARE EDUCATION/TRAINING PROGRAM

## 2025-07-05 PROCEDURE — 94640 AIRWAY INHALATION TREATMENT: CPT

## 2025-07-05 PROCEDURE — 72141 MRI NECK SPINE W/O DYE: CPT

## 2025-07-05 PROCEDURE — 63710000001 ONDANSETRON ODT 4 MG TABLET DISPERSIBLE

## 2025-07-05 PROCEDURE — 83935 ASSAY OF URINE OSMOLALITY: CPT | Performed by: STUDENT IN AN ORGANIZED HEALTH CARE EDUCATION/TRAINING PROGRAM

## 2025-07-05 PROCEDURE — 94664 DEMO&/EVAL PT USE INHALER: CPT

## 2025-07-05 PROCEDURE — 85027 COMPLETE CBC AUTOMATED: CPT | Performed by: STUDENT IN AN ORGANIZED HEALTH CARE EDUCATION/TRAINING PROGRAM

## 2025-07-05 PROCEDURE — 99222 1ST HOSP IP/OBS MODERATE 55: CPT

## 2025-07-05 RX ORDER — PREGABALIN 75 MG/1
75 CAPSULE ORAL 2 TIMES DAILY
Status: DISCONTINUED | OUTPATIENT
Start: 2025-07-05 | End: 2025-07-07

## 2025-07-05 RX ORDER — ONDANSETRON 4 MG/1
4 TABLET, ORALLY DISINTEGRATING ORAL EVERY 6 HOURS PRN
Status: DISCONTINUED | OUTPATIENT
Start: 2025-07-05 | End: 2025-07-11 | Stop reason: HOSPADM

## 2025-07-05 RX ORDER — HYDROCODONE BITARTRATE AND ACETAMINOPHEN 5; 325 MG/1; MG/1
1 TABLET ORAL EVERY 4 HOURS
Refills: 0 | Status: DISCONTINUED | OUTPATIENT
Start: 2025-07-05 | End: 2025-07-11 | Stop reason: HOSPADM

## 2025-07-05 RX ORDER — HYDROMORPHONE HYDROCHLORIDE 1 MG/ML
0.25 INJECTION, SOLUTION INTRAMUSCULAR; INTRAVENOUS; SUBCUTANEOUS EVERY 4 HOURS PRN
Status: DISCONTINUED | OUTPATIENT
Start: 2025-07-05 | End: 2025-07-11 | Stop reason: HOSPADM

## 2025-07-05 RX ORDER — NALOXONE HCL 0.4 MG/ML
0.4 VIAL (ML) INJECTION
Status: DISCONTINUED | OUTPATIENT
Start: 2025-07-05 | End: 2025-07-11 | Stop reason: HOSPADM

## 2025-07-05 RX ADMIN — PREGABALIN 75 MG: 75 CAPSULE ORAL at 14:10

## 2025-07-05 RX ADMIN — HYDROCODONE BITARTRATE AND ACETAMINOPHEN 1 TABLET: 5; 325 TABLET ORAL at 21:34

## 2025-07-05 RX ADMIN — HYDROCODONE BITARTRATE AND ACETAMINOPHEN 1 TABLET: 5; 325 TABLET ORAL at 14:10

## 2025-07-05 RX ADMIN — ARFORMOTEROL TARTRATE 15 MCG: 15 SOLUTION RESPIRATORY (INHALATION) at 07:58

## 2025-07-05 RX ADMIN — PANTOPRAZOLE SODIUM 40 MG: 40 TABLET, DELAYED RELEASE ORAL at 05:12

## 2025-07-05 RX ADMIN — REVEFENACIN 175 MCG: 175 SOLUTION RESPIRATORY (INHALATION) at 07:51

## 2025-07-05 RX ADMIN — DULOXETINE 60 MG: 60 CAPSULE, DELAYED RELEASE ORAL at 08:30

## 2025-07-05 RX ADMIN — HEPARIN SODIUM 5000 UNITS: 5000 INJECTION INTRAVENOUS; SUBCUTANEOUS at 21:34

## 2025-07-05 RX ADMIN — FLUTICASONE PROPIONATE 2 SPRAY: 50 SPRAY, METERED NASAL at 10:24

## 2025-07-05 RX ADMIN — INSULIN HUMAN 2 UNITS: 100 INJECTION, SOLUTION PARENTERAL at 12:06

## 2025-07-05 RX ADMIN — HYDROCODONE BITARTRATE AND ACETAMINOPHEN 1 TABLET: 5; 325 TABLET ORAL at 18:02

## 2025-07-05 RX ADMIN — INSULIN HUMAN 3 UNITS: 100 INJECTION, SOLUTION PARENTERAL at 21:34

## 2025-07-05 RX ADMIN — TIZANIDINE 4 MG: 4 TABLET ORAL at 21:34

## 2025-07-05 RX ADMIN — HYDROMORPHONE HYDROCHLORIDE 0.5 MG: 1 INJECTION, SOLUTION INTRAMUSCULAR; INTRAVENOUS; SUBCUTANEOUS at 03:00

## 2025-07-05 RX ADMIN — ISOSORBIDE MONONITRATE 30 MG: 30 TABLET, EXTENDED RELEASE ORAL at 05:13

## 2025-07-05 RX ADMIN — TIZANIDINE 4 MG: 4 TABLET ORAL at 03:57

## 2025-07-05 RX ADMIN — HYDROMORPHONE HYDROCHLORIDE 0.5 MG: 1 INJECTION, SOLUTION INTRAMUSCULAR; INTRAVENOUS; SUBCUTANEOUS at 08:30

## 2025-07-05 RX ADMIN — INSULIN HUMAN 2 UNITS: 100 INJECTION, SOLUTION PARENTERAL at 05:25

## 2025-07-05 RX ADMIN — LEVOTHYROXINE SODIUM 50 MCG: 0.05 TABLET ORAL at 08:31

## 2025-07-05 RX ADMIN — DEXAMETHASONE SODIUM PHOSPHATE 4 MG: 4 INJECTION, SOLUTION INTRA-ARTICULAR; INTRALESIONAL; INTRAMUSCULAR; INTRAVENOUS; SOFT TISSUE at 12:06

## 2025-07-05 RX ADMIN — DEXAMETHASONE SODIUM PHOSPHATE 4 MG: 4 INJECTION, SOLUTION INTRA-ARTICULAR; INTRALESIONAL; INTRAMUSCULAR; INTRAVENOUS; SOFT TISSUE at 05:12

## 2025-07-05 RX ADMIN — HEPARIN SODIUM 5000 UNITS: 5000 INJECTION INTRAVENOUS; SUBCUTANEOUS at 14:10

## 2025-07-05 RX ADMIN — BUDESONIDE 0.5 MG: 0.5 INHALANT RESPIRATORY (INHALATION) at 19:54

## 2025-07-05 RX ADMIN — ONDANSETRON 4 MG: 4 TABLET, ORALLY DISINTEGRATING ORAL at 10:54

## 2025-07-05 RX ADMIN — ARFORMOTEROL TARTRATE 15 MCG: 15 SOLUTION RESPIRATORY (INHALATION) at 19:54

## 2025-07-05 RX ADMIN — Medication 10 ML: at 21:34

## 2025-07-05 RX ADMIN — BUDESONIDE 0.5 MG: 0.5 INHALANT RESPIRATORY (INHALATION) at 07:51

## 2025-07-05 RX ADMIN — HYDROMORPHONE HYDROCHLORIDE 0.5 MG: 1 INJECTION, SOLUTION INTRAMUSCULAR; INTRAVENOUS; SUBCUTANEOUS at 12:11

## 2025-07-05 RX ADMIN — MONTELUKAST 10 MG: 10 TABLET, FILM COATED ORAL at 21:34

## 2025-07-05 RX ADMIN — Medication 10 ML: at 08:31

## 2025-07-05 RX ADMIN — HEPARIN SODIUM 5000 UNITS: 5000 INJECTION INTRAVENOUS; SUBCUTANEOUS at 05:12

## 2025-07-05 RX ADMIN — VALSARTAN 160 MG: 160 TABLET, FILM COATED ORAL at 08:31

## 2025-07-05 RX ADMIN — PREGABALIN 75 MG: 75 CAPSULE ORAL at 21:34

## 2025-07-05 NOTE — THERAPY EVALUATION
Patient Name: Raquel Mariee  : 1955    MRN: [id number]                              Today's Date: 2025       Admit Date: 2025    Visit Dx: No diagnosis found.  Patient Active Problem List   Diagnosis    Hypogammaglobulinemia    Gastric bypass status for obesity    CKD (chronic kidney disease), stage III    KALYN (obstructive sleep apnea)    Major depressive disorder in partial remission    Hypothyroidism    Fibromyalgia syndrome    Abdominal aortic aneurysm (AAA) without rupture    Nonrheumatic aortic valve stenosis    Vitamin D deficiency, unspecified     Anatomical narrow angle borderline glaucoma    Abnormal finding of blood chemistry, unspecified     Chronic diastolic heart failure    Prinzmetal angina    Common variable agammaglobulinemia    Polymyositis    Port-A-Cath in place    Abnormal CT of the chest    Acute bronchospasm    Acute kidney injury    Sinusitis, chronic    Anemia    Aortic valve stenosis and insufficiency, rheumatic    Body mass index (BMI) of 40.0 to 44.9 in adult    Calcium kidney stones    Choroidal nevus of left eye    CAD (coronary artery disease)    Claw toe, acquired    Combined forms of age-related cataract of both eyes    Depressive disorder    Essential hypertension with goal blood pressure less than 140/90    Severe persistent asthma with exacerbation    Glaucoma suspect of both eyes    Inappropriate sinus tachycardia    Migraine without aura    Mitral valve regurgitation    Osteoarthrosis involving lower leg    Pain in joint involving lower leg    Posterior vitreous detachment of both eyes    Primary osteoarthritis of left wrist    Anxiety disorder    Status post total knee replacement    Sleep disturbances    Senile nuclear sclerosis    Rotator cuff syndrome of left shoulder    Pure hypercholesterolemia    Thoracic aortic aneurysm without rupture    TIA (transient ischemic attack)    Tracheomalacia    Trochlear nerve palsy, left    Diabetes mellitus without  complication    Urge incontinence of urine    Acute exacerbation of chronic obstructive pulmonary disease (COPD)    Internal hemorrhoid, bleeding    Spinal stenosis     Past Medical History:   Diagnosis Date    Anxiety     Aortic valve stenosis     Cardiac murmur     Cataract, bilateral     CHF (congestive heart failure)     Cholelithiasis GB out    Chronic kidney disease     Stage III    Clostridium difficile infection     in her 30s    Colon polyp Ever since having colonoscopies.    Common variable immunodeficiency     IVIG infusions    Compression fracture of lumbar spine, non-traumatic     COPD (chronic obstructive pulmonary disease)     Coronary artery disease     Prinz metal angina & aortic stenosis    Depression     Diabetes mellitus     type II    Eosinophilic asthma     Fibromyalgia, primary     Full dentures     GERD (gastroesophageal reflux disease)     History of transfusion     [facility].  No reaction noted, patient states she does have an antibody from transfusion.    Hypertension     Hypogammaglobulinemia     Hypothyroidism     Irritable bowel syndrome     Migraines     Morbid obesity     Optic neuritis     Optic neuropathy     Osteoarthritis     Prinzmetal angina 1990    Pseudomonas infection 1998    lungs    PTSD (post-traumatic stress disorder)     Pulmonary edema     Renal insufficiency     Sinus tachycardia 1990    Sleep apnea     no longer uses/needs cpap    Stroke     TIA about 7 years ago    Tachycardia     TIA (transient ischemic attack) 2017    Trochanteric bursitis 01/25/2023     Past Surgical History:   Procedure Laterality Date    APPENDECTOMY      BUNIONECTOMY Bilateral     CARDIAC CATHETERIZATION  2017    no stents needed    CARPAL TUNNEL RELEASE Right     COLONOSCOPY  2021    ENDOMETRIAL ABLATION  1997    EYE SURGERY  ? About 6-8 years ago    Laser for glaucoma    FRACTURE SURGERY  1995    No hardware; Tibeal plateau    GASTRIC BYPASS      HAND SURGERY Left      No hardware    HEMORRHOIDECTOMY N/A 04/09/2024    Procedure: HEMORRHOID BANDING X2;  Surgeon: Melissa Beck MD;  Location: [facility] OR;  Service: General;  Laterality: N/A;    INTERSTIM PLACEMENT N/A 05/03/2023    Procedure: INTERSTIM STAGES 1 AND 2 LEAD AND GENERATOR PLACEMENT;  Surgeon: Abner Nation MD;  Location: [facility] OR;  Service: Urology;  Laterality: N/A;    POSTERIOR VAGINAL REPAIR N/A 08/02/2023    Procedure: POSTERIOR COLPORRHAPHY;  Surgeon: Kellen Galan MD;  Location: [facility] OR;  Service: Gynecology;  Laterality: N/A;    RECTAL EXAMINATION UNDER ANESTHESIA N/A 04/09/2024    Procedure: RECTAL EXAM UNDER ANESTHESIA;  Surgeon: Melissa Beck MD;  Location: [facility] OR;  Service: General;  Laterality: N/A;    REPLACEMENT TOTAL KNEE BILATERAL      TEETH EXTRACTION      all of bottom teeth removed    THORACOTOMY      TONSILLECTOMY      TOTAL ABDOMINAL HYSTERECTOMY WITH SALPINGO OOPHORECTOMY      TRACHEAL SURGERY      Repaired via thoracotomy    TUBAL ABDOMINAL LIGATION  1983    VENOUS ACCESS DEVICE (PORT) INSERTION      port a cath in the left chest wall      General Information       Row Name 07/05/25 1606          OT Time and Intention    Document Type evaluation  -SA     Mode of Treatment occupational therapy  -SA       Row Name 07/05/25 1606          General Information    Patient Profile Reviewed yes  -SA     Prior Level of Function independent:;all household mobility;community mobility;gait;transfer;bed mobility;min assist:;bathing;dressing  Pt reports independence with all mobility with use of Hurrycane last ~5 mo; occasional assist with LBD and bathing, otherwise independent with ADLs; denies history of falls  -SA     Existing Precautions/Restrictions spinal;fall  -SA     Barriers to Rehab medically complex;previous functional deficit  -SA       Row Name 07/05/25 1606          Occupational Profile    Occupational History/Life Experiences (Occupational Profile) Owns [employer],  standard walker; W[employer]  -       Row Name 07/05/25 1606          Living Environment    Current Living Arrangements home  -     People in Home spouse  -       Row Name 07/05/25 1606          Home Main Entrance    Number of Stairs, Main Entrance none  -[facility] Name 07/05/25 1606          Stairs Within Home, Primary    Number of Stairs, Within Home, Primary none  -[facility] Name 07/05/25 1606          Cognition    Orientation Status (Cognition) oriented x 4  -[facility] Name 07/05/25 1606          Safety Issues/Impairments Affecting Functional Mobility    Safety Issues Affecting Function (Mobility) safety precaution awareness  -     Impairments Affecting Function (Mobility) endurance/activity tolerance;pain;postural/trunk control;strength  -     Comment, Safety Issues/Impairments (Mobility) Alert, following commands  -               User Key  (r) = Recorded By, (t) = Taken By, (c) = Cosigned By      Initials Name Provider Type    SA Nehemiah Platt OT Occupational Therapist                     Mobility/ADL's       [facility] Name 07/05/25 1608          Bed Mobility    Bed Mobility supine-sit;sit-supine  -     Supine-Sit [place] (Bed Mobility) modified independence  -     Sit-Supine [place] (Bed Mobility) modified independence  -     Bed Mobility, Safety Issues impaired trunk control for bed mobility  -     Assistive Device (Bed Mobility) head of bed elevated;bed rails  -     Comment, (Bed Mobility) Increased time and effort secondary to pain  -[facility] Name 07/05/25 1608          Transfers    Transfers sit-stand transfer;stand-sit transfer  -     Comment, (Transfers) VC for hand placement  -[facility] Name 07/05/25 1608          Sit-Stand Transfer    Sit-Stand [place] (Transfers) standby assist;verbal cues  -     Assistive Device (Sit-Stand Transfers) walker, front-wheeled  -     Comment, (Sit-Stand Transfer) VC for hand placement  -[facility] Name 07/05/25 1608           Stand-Sit Transfer    Stand-Sit [place] (Transfers) standby assist;verbal cues  -     Assistive Device (Stand-Sit Transfers) walker, front-wheeled  -     Comment, (Stand-Sit Transfer) VC to reach back for bed rail  -       Row Name 07/05/25 1608          Functional Mobility    Functional Mobility- Ind. Level standby assist  -     Functional Mobility- Device walker, front-wheeled  -     Functional Mobility-Distance (Feet) 2  -SA     Functional Mobility- Comment Pt ambulated 2ft forward and 2ft backwards with RWx and SBA, further mobility limited by RLE pain  -     Patient was able to Ambulate yes  -       Row Name 07/05/25 1608          Activities of Daily Living    BADL Assessment/Intervention bathing;lower body dressing;toileting  -       Row Name 07/05/25 1608          Bathing Assessment/Intervention    Assistive Devices (Bathing) long-handled sponge  -     Comment, (Bathing) Pt provided and educated on long handled sponge to use when bathing as needed, pt verbalized her understanding  -       Row Name 07/05/25 1608          Lower Body Dressing Assessment/Training    [place] Level (Lower Body Dressing) don;doff;socks;maximum assist (25% patient effort)  -     Assistive Devices (Lower Body Dressing) long-handled shoe horn;reacher;sock-aid  -     Position (Lower Body Dressing) supine  -     Comment, (Lower Body Dressing) Pt provided and educated on AE for LBD, pt verbalized her understanding, however, would benefit from practice prior to discharge  -       Row Name 07/05/25 1608          Toileting Assessment/Training    Assistive Devices (Toileting) toilet paper aid  -     Comment, (Toileting) Pt provided and educated on toilet paper aid, pt verbalized her understanding  -               User Key  (r) = Recorded By, (t) = Taken By, (c) = Cosigned By      Initials Name Provider Type    Nehemiah Jewell OT Occupational Therapist                   Obj/Interventions        [facility] Name 07/05/25 1612          Sensory Assessment (Somatosensory)    Sensory Assessment (Somatosensory) UE sensation intact  -SA       Row Name 07/05/25 1612          Vision Assessment/Intervention    Visual Impairment/Limitations WFL  -[facility] Name 07/05/25 1612          Range of Motion Comprehensive    General Range of Motion no range of motion deficits identified  -SA       Row Name 07/05/25 1612          Strength Comprehensive (MMT)    Comment, General Manual Muscle Testing (MMT) Assessment BUE grossly 4/5 throghout  -[facility] Name 07/05/25 1612          Balance    Balance Assessment sitting static balance;sitting dynamic balance;sit to stand dynamic balance;standing static balance;standing dynamic balance  -SA     Static Sitting Balance independent  -SA     Dynamic Sitting Balance standby assist  -SA     Position, Sitting Balance unsupported;sitting edge of bed  -SA     Static Standing Balance standby assist  -SA     Dynamic Standing Balance standby assist  -SA     Position/Device Used, Standing Balance supported;walker, front-wheeled  -SA     Balance Interventions sitting;standing;sit to stand;supported;static;dynamic  -SA     Comment, Balance No LOB  -SA               User Key  (r) = Recorded By, (t) = Taken By, (c) = Cosigned By      Initials Name Provider Type    SA Nehemiah Platt OT Occupational Therapist                   Goals/Plan       Row Name 07/05/25 1616          Transfer Goal 1 (OT)    Activity/Assistive Device (Transfer Goal 1, OT) bed-to-chair/chair-to-bed  -SA     [place] Level/Cues Needed (Transfer Goal 1, OT) standby assist  -SA     Time Frame (Transfer Goal 1, OT) long term goal (LTG);10 days  -SA     Progress/Outcome (Transfer Goal 1, OT) new goal  -SA       Row Name 07/05/25 1616          Bathing Goal 1 (OT)    Activity/Device (Bathing Goal 1, OT) bathing skills, all  -SA     [place] Level/Cues Needed (Bathing Goal 1, OT) set-up required  -SA     Time Frame  (Bathing Goal 1, OT) long term goal (LTG);10 days  -SA     Progress/Outcomes (Bathing Goal 1, OT) new goal  -SA       Row Name 07/05/25 1616          Dressing Goal 1 (OT)    Activity/Device (Dressing Goal 1, OT) lower body dressing  -SA     [place]/Cues Needed (Dressing Goal 1, OT) set-up required  -SA     Time Frame (Dressing Goal 1, OT) short term goal (STG);5 days  -SA     Progress/Outcome (Dressing Goal 1, OT) new goal  -SA       Row Name 07/05/25 1616          Grooming Goal 1 (OT)    Activity/Device (Grooming Goal 1, OT) grooming skills, all  -SA     [place] (Grooming Goal 1, OT) standby assist;other (see comments)  [place]  -SA     Time Frame (Grooming Goal 1, OT) long term goal (LTG);10 days  -SA     Progress/Outcome (Grooming Goal 1, OT) new goal  -       Row Name 07/05/25 1616          Therapy Assessment/Plan (OT)    Planned Therapy Interventions (OT) activity tolerance training;adaptive equipment training;BADL retraining;occupation/activity based interventions;patient/caregiver education/training;ROM/therapeutic exercise;strengthening exercise;transfer/mobility retraining  -               User Key  (r) = Recorded By, (t) = Taken By, (c) = Cosigned By      Initials Name Provider Type    SA Nehemiah Platt, OT Occupational Therapist                   Clinical Impression       Row Name 07/05/25 1613          Pain Assessment    Pretreatment Pain Rating 2/10  -SA     Posttreatment Pain Rating 7/10  -SA     Pain Location extremity  -SA     Pain Side/Orientation lower;right  -SA     Pain Management Interventions activity modification encouraged;exercise or physical activity utilized;positioning techniques utilized  -SA     Response to Pain Interventions activity participation with tolerable pain  -SA       Row Name 07/05/25 1613          Plan of Care Review    Plan of Care Reviewed With patient  -SA     Progress no change  -SA     Outcome Evaluation OT evaluation complete. Pt presents with  weakness, acute pain, decreased activity tolerance, and decreased independence with ADLs. Pt performed bed mobility with Mod I and was able to stand with RWx and SBA, however, unable to tolerate additional activity secondary to RLE pain. Pt required Max A to don socks secondary to pain. Pt provided and educated on AE to use as needed to help promote pain relief with ADLs. Pt would benefit from skilled IPOT services to improve fxnl status. OT recommends IRF at discharge.  -       Row Name 07/05/25 1613          Therapy Assessment/Plan (OT)    Rehab Potential (OT) good  -SA     Criteria for Skilled Therapeutic Interventions Met (OT) yes;meets criteria;skilled treatment is necessary  -     Therapy Frequency (OT) daily  -SA     Predicted Duration of Therapy Intervention (OT) 10 days  -       Row Name 07/05/25 1613          Therapy Plan Review/Discharge Plan (OT)    Anticipated Discharge Disposition (OT) inpatient rehabilitation facility  -       Row Name 07/05/25 1613          Vital Signs    Pre Systolic BP Rehab 164  -SA     Pre Treatment Diastolic BP 91  -SA     Post Systolic BP Rehab 174  -SA     Post Treatment Diastolic BP 94  -SA     Pretreatment Heart Rate (beats/min) 81  -SA     Posttreatment Heart Rate (beats/min) 83  -SA     Pre SpO2 (%) 96  -SA     O2 Delivery Pre Treatment room air  -SA     Post SpO2 (%) 96  -SA     O2 Delivery Post Treatment room air  -SA     Pre Patient Position Supine  -SA     Post Patient Position Supine  -SA       Row Name 07/05/25 1613          Positioning and Restraints    Pre-Treatment Position in bed  -SA     Post Treatment Position bed  -SA     In Bed notified nsg;supine;fowlers;call light within reach;encouraged to call for assist;exit alarm on;side rails up x2  -SA               User Key  (r) = Recorded By, (t) = Taken By, (c) = Cosigned By      Initials Name Provider Type    Nehemiah Jewell, OT Occupational Therapist                   Outcome Measures       Row Name  07/05/25 1617          How much help from another is currently needed...    Putting on and taking off regular lower body clothing? 2  -SA     Bathing (including washing, rinsing, and drying) 2  -SA     Toileting (which includes using toilet bed pan or urinal) 2  -SA     Putting on and taking off regular upper body clothing 3  -SA     Taking care of personal grooming (such as brushing teeth) 3  -SA     Eating meals 4  -     AM-PAC 6 Clicks Score (OT) 16  -       Row Name 07/05/25 1418          How much help from another person do you currently need...    Turning from your back to your side while in flat bed without using bedrails? 4  -SC     Moving from lying on back to sitting on the side of a flat bed without bedrails? 3  -SC     Moving to and from a bed to a chair (including a wheelchair)? 3  -SC     Standing up from a chair using your arms (e.g., wheelchair, bedside chair)? 3  -SC     To walk in hospital room? 2  -SC       Row Name 07/05/25 1617 07/05/25 1418       Functional Assessment    Outcome Measure Options AM-PAC 6 Clicks Daily Activity (OT)  - AM-PAC 6 Clicks Basic Mobility (PT)  -SC              User Key  (r) = Recorded By, (t) = Taken By, (c) = Cosigned By      Initials Name Provider Type    Apolinar Mcrae, PT Physical Therapist    Nehemiah Jewell OT Occupational Therapist                    Occupational Therapy Education       Title: PT OT SLP Therapies (In Progress)       Topic: Occupational Therapy (In Progress)       Point: ADL training (In Progress)       Learning Progress Summary            Patient Acceptance, E,D, NR by  at 7/5/2025 1618                      Point: Precautions (In Progress)       Learning Progress Summary            Patient Acceptance, E,D, NR by  at 7/5/2025 1618                      Point: Body mechanics (In Progress)       Learning Progress Summary            Patient Acceptance, E,D, NR by  at 7/5/2025 1618                                      User Key        Initials Effective Dates Name Provider Type Discipline     04/16/25 -  Nehemiah Platt OT Occupational Therapist OT                  OT Recommendation and Plan  Planned Therapy Interventions (OT): activity tolerance training, adaptive equipment training, BADL retraining, occupation/activity based interventions, patient/caregiver education/training, ROM/therapeutic exercise, strengthening exercise, transfer/mobility retraining  Therapy Frequency (OT): daily  Plan of Care Review  Plan of Care Reviewed With: patient  Progress: no change  Outcome Evaluation: OT evaluation complete. Pt presents with weakness, acute pain, decreased activity tolerance, and decreased independence with ADLs. Pt performed bed mobility with Mod I and was able to stand with RWx and SBA, however, unable to tolerate additional activity secondary to RLE pain. Pt required Max A to don socks secondary to pain. Pt provided and educated on AE to use as needed to help promote pain relief with ADLs. Pt would benefit from skilled IPOT services to improve fxnl status. OT recommends IRF at discharge.     Time Calculation:   Evaluation Complexity (OT)  Review Occupational Profile/Medical/Therapy History Complexity: expanded/moderate complexity  Assessment, Occupational Performance/Identification of Deficit Complexity: 3-5 performance deficits  Clinical Decision Making Complexity (OT): detailed assessment/moderate complexity  Overall Complexity of Evaluation (OT): moderate complexity     Time Calculation- OT       Row Name 07/05/25 1618 07/05/25 1312          Time Calculation- OT    OT Start Time 1543  -SA --     OT Received On 07/05/25 -SA --     OT Goal Re-Cert Due Date 07/15/25  -SA --        Timed Charges    82552 - Gait Training Minutes  -- 5  -SC     06898 - OT Therapeutic Activity Minutes 10  -SA --     58739 - OT Self Care/Mgmt Minutes 15  -SA --        Untimed Charges    OT Eval/Re-eval Minutes 31 -SA --        Total Minutes    Timed  Charges Total Minutes 25  -SA 5  -SC     Untimed Charges Total Minutes 31  -SA --      Total Minutes 56  -SA 5  -SC               User Key  (r) = Recorded By, (t) = Taken By, (c) = Cosigned By      Initials Name Provider Type    Apolinar Mcrae, PT Physical Therapist    Nehemiah Jewell OT Occupational Therapist                  Therapy Charges for Today       Code Description Service Date Service Provider Modifiers Qty    [id number]  OT THERAPEUTIC ACT EA 15 MIN 7/5/2025 Nehemiah Platt OT GO 1    [id number]  OT SELF CARE/MGMT/TRAIN EA 15 MIN 7/5/2025 Nehemiah Platt OT GO 1    [id number]  OT EVAL MOD COMPLEXITY 3 7/5/2025 Nehemiah Platt OT GO 1                 Nehemiah Platt OT  7/5/2025

## 2025-07-05 NOTE — PROGRESS NOTES
[facility] Medicine Services  PROGRESS NOTE    Patient Name: Raquel Mariee  : 1955  MRN: [id number]    Date of Admission: 2025  Primary Care Physician: Sanjeev Rawls MD    Subjective   Subjective     CC:  Radicular pain    HPI:  Still having some discomfort in the right thigh.  Denies LE weakness.  Says sensation intact in her legs except for one area near her right great toe that has some numbness.      Objective   Objective     Vital Signs:   Temp:  [97.5 °F (36.4 °C)-98.1 °F (36.7 °C)] 97.6 °F (36.4 °C)  Heart Rate:  [75-93] 88  Resp:  [16] 16  BP: (149-185)/(78-98) 158/84     Physical Exam:    Non toxic appearing, in bed  MM moist  RRR  CTAB  Abd soft  Awake, speech clear  Normal affect  Dorsi and plantar flexion 5/5 strength bilaterally    Results Reviewed:  LAB RESULTS:      Lab 25  0434 25  1227   WBC 5.49 6.81   HEMOGLOBIN 12.5 12.1   HEMATOCRIT 36.8 35.4   PLATELETS 205 211   NEUTROS ABS  --  5.31   IMMATURE GRANS (ABS)  --  0.04   LYMPHS ABS  --  0.92   MONOS ABS  --  0.51   EOS ABS  --  0.00   MCV 95.8 93.7         Lab 25  0434 25  1227   SODIUM 131* 127*   POTASSIUM 4.4 4.5   CHLORIDE 96* 93*   CO2 23.7 22.8   ANION GAP 11.3 11.2   BUN 10.2 15.0   CREATININE 0.87 0.95   EGFR 72.2 65.0   GLUCOSE 160* 130*   CALCIUM 8.6 8.9   MAGNESIUM 2.0  --    TSH 1.450  --          Lab 25  1227   TOTAL PROTEIN 7.0   ALBUMIN 3.8   GLOBULIN 3.2   ALT (SGPT) 18   AST (SGOT) 30   BILIRUBIN 0.4   ALK PHOS 44                     Brief Urine Lab Results  (Last result in the past 365 days)        Color   Clarity   Blood   Leuk Est   Nitrite   Protein   CREAT   Urine HCG        25 1327 Yellow   Clear   Trace   Small (1+)   Negative   Negative                   Microbiology Results Abnormal       None            MRI Cervical Spine Without Contrast  Result Date: 2025  MRI CERVICAL SPINE WO CONTRAST Date of Exam: 2025 2:12 AM EDT  Indication: severe stenosis.  Comparison: None. Technique:  Routine multiplanar/multisequence sequence images of the cervical spine were obtained without contrast administration.  Findings: Seven cervical vertebra are identified. Alignment is anatomic. Vertebral bodies maintain normal height.  There is no focal bone marrow edema.  No focal lesions identified. Spinal cord signal is normal. Paraspinal musculature is grossly preserved. The craniocervical junction appears within normal limits. C2-C3: Small disc osteophyte complex present with bilateral facet perjury. No evidence of canal stenosis or significant neuroforaminal narrowing. C3-C4: Small disc osteophyte complex present with bilateral facet hypertrophy. There is effacement of the thecal sac anteriorly with no evidence of canal stenosis. Moderate neuroforaminal narrowing is present on the left with no significant neuroforaminal narrowing present on the right. Trace anterolisthesis of C3 on C4 present measuring 2 mm.. C4-C5: Moderate size disc osteophyte complex present with bilateral facet hypertrophy. There is effacement of the thecal sac anteriorly with moderate canal stenosis with the canal measuring up to 8 mm in AP dimension. Moderate neural foraminal narrowing is present on the right with moderate to severe neural foraminal narrowing present on the left. C5-C6: Small to moderate-sized disc osteophyte complex present with bilateral facet hypertrophy. There is effacement of the thecal sac anteriorly with mild canal stenosis with the canal measuring up to 9 mm in AP dimension. Moderate neuroforaminal narrowing is present bilaterally, left greater than right. C6-C7: Moderate size disc osteophyte complex present with bilateral facet hypertrophy. There is effacement of the thecal sac anteriorly with moderate canal stenosis with the canal measuring up to 8 mm in AP dimension. Moderate neuroforaminal narrowing is  present on the left with mild to moderate neural  foraminal narrowing present on the right. C7-T1: Small disc osteophyte complex present with bilateral facet apostrophe. No evidence of canal stenosis or significant neuroforaminal narrowing.     Impression: Impression: No acute osseous abnormality. Multilevel degenerative changes are present throughout the spine with multilevel canal stenosis present, most pronounced at C4-C5 and C6-C7. Varying degrees of neuroforaminal narrowing as described above. Electronically Signed: Malika Irby MD  7/5/2025 3:03 AM EDT  Workstation ID: [id number]    MRI Thoracic Spine Without Contrast  Result Date: 7/5/2025  MRI THORACIC SPINE WO CONTRAST Date of Exam: 7/5/2025 12:56 AM EDT Indication: r incontinence, evaluate for cauda equina.  Comparison: 7/4/2025, 6/18/2025. Technique:  Routine multiplanar/multisequence sequence images of the thoracic spine were obtained without contrast administration. Findings: There are 12 thoracic thoracic type vertebral bodies in normal anatomic alignment. No evidence of fracture or compression deformity. No significant spondylolisthesis. Mild to moderate degenerative changes are present throughout the spine. There is no evidence of canal stenosis. No cord signal abnormality identified. Bilateral facet hypertrophy present at T11-T12 with facet joint effusions with mild effacement of the thecal sac with no evidence of canal stenosis. No significant neuroforaminal narrowing identified at any level. No focal soft tissue abnormalities identified.     Impression: Impression: No acute osseous abnormality. Mild to moderate degenerative changes are present throughout the spine with no evidence of canal stenosis or significant neuroforaminal narrowing at any level. Electronically Signed: Malika Irby MD  7/5/2025 1:40 AM EDT  Workstation ID: [id number]    MRI Lumbar Spine Without Contrast  Result Date: 7/4/2025  MRI LUMBAR SPINE WO CONTRAST Date of Exam: 7/4/2025 10:52 PM EDT Indication: r back pain, incontinence,  worsening pain  Comparison: 7/4/2025, 6/18/2025. Technique:  Routine multiplanar/multisequence sequence images of the lumbar spine were obtained without contrast administration.  Findings: Five lumbar type vertebral bodies are identified.  Alignment is anatomic.  There is no evidence of fracture or compression deformity. Distal spinal cord and conus medullaris appear unremarkable terminating at the L1 level.  Cauda equina appears unremarkable.  Bone marrow signal is within normal limits.  Paraspinal musculature is preserved. L5-S1: No focal disc protrusion or extrusion. Facet joints appear unremarkable. No significant neural foraminal or spinal canal stenosis. L4-L5: There is mild anterolisthesis of L4 and L5 measuring approximately 3 mm related to degenerative change. No pars defect identified. Mild broad-based bulge present with bilateral facet hypertrophy. No evidence of canal stenosis. Mild neural foraminal narrowing present bilaterally, right greater than left. L3-L4: Broad-based bulge present with bilateral facet hypertrophy. There is effacement of the thecal sac anteriorly with no evidence of canal stenosis. Mild neuroforaminal narrowing present bilaterally. L2-L3: Broad-based bulge with superimposed right paracentral disc extrusion present with effacement of the right lateral recess. Facet hypertrophy is present. There is effacement of the thecal sac with no evidence of canal stenosis. At least moderate neuroforaminal narrowing present bilaterally. L1-L2: No focal disc protrusion or extrusion. Facet joints appear unremarkable. No significant neural foraminal or spinal canal stenosis.     Impression: Impression: No acute osseous abnormality. Multilevel degenerative changes are present throughout the spine with multilevel canal effacement with no evidence of canal stenosis, similar as compared to the previous recent study from 6/18/2025. Varying degrees of neuroforaminal narrowing as described above.  Electronically Signed: Malika Irby MD  7/4/2025 11:30 PM EDT  Workstation ID: [id number]    CT Abdomen Pelvis With Contrast  Result Date: 7/4/2025  PROCEDURE: CT ABDOMEN PELVIS W CONTRAST-  HISTORY:  Low back pain, urinary incontinence, fecal incontinence, eval distended bladder or bowel  COMPARISON:  None .  TECHNIQUE: Multiple axial CT images were obtained from the lung bases through the pubic symphysis following the administration of Isovue 300 contrast.  FINDINGS:  ABDOMEN: There is a trace right pleural effusion. There is right pleural thickening. Right middle lobe atelectasis is seen. The heart is normal in size. There are postsurgical changes of gastric bypass. The liver is normal. Gallbladder surgically absent. The spleen is unremarkable. No adrenal mass is present.  The pancreas is normal. There is a 3 mm nonobstructing left renal stone. The aorta is normal in caliber. There is no free fluid or adenopathy.  A moderate amount of stool is seen in the transverse colon.  PELVIS: The appendix is not identified. There is sigmoid diverticulosis. The uterus is surgically absent. The urinary bladder is unremarkable. There is no significant free fluid or adenopathy.      Impression: Right pleural thickening and right middle lobe atelectasis may be inflammatory. Short interval follow-up chest CT is recommended.  Moderate amount of stool in the transverse colon is consistent with constipation.  Left nephrolithiasis without hydronephrosis. .   CTDI: 22.43 mGy DLP: 1130.99 mGy.cm   This study was performed with techniques to keep radiation doses as low as reasonably achievable (ALARA). Individualized dose reduction techniques using automated exposure control or adjustment of mA and/or kV according to the patient size were employed.      Images were reviewed, interpreted, and dictated by Dr. Kwaku Grant MD Transcribed by Leyda Rachel PA-C.  This report was signed and finalized on 7/4/2025 2:01 PM by Kwaku  MD Juanita.      CT Lumbar Spine Without Contrast  Result Date: 7/4/2025  PROCEDURE: CT LUMBAR SPINE WO CONTRAST-  HISTORY: Low back pain, urinary incontinence, eval fracture  TECHNIQUE: Multiple axial CT images were obtained through the lumbar spine using bone window algorithms. Coronal and sagittal images were reconstructed from the original axial data set.  FINDINGS: There is spaces are moderately diffusely degenerated. There is anterolisthesis of L4 on L5. There is no acute fracture.  There are disc bulges and facet hypertrophy throughout the lumbar spine. This results in canal stenosis and neuroforaminal narrowing throughout the lower lumbar spine.          Impression: Degenerative disc disease.  No acute fracture.     DLP: 1130.99 mGy.cm CTDI: 22.43 mGy   This study was performed with techniques to keep radiation doses as low as reasonably achievable (ALARA). Individualized dose reduction techniques using automated exposure control or adjustment of mA and/or kV according to the patient size were employed.     Images were reviewed, interpreted, and dictated by Dr. Kwaku Grant MD Transcribed by Leyda Rachel PA-C.  This report was signed and finalized on 7/4/2025 2:01 PM by Kwaku Grant MD.        Results for orders placed in visit on 03/28/25    Adult Transthoracic Echo Complete W/ Cont if Necessary Per Protocol 03/31/2025  2:42 PM    Interpretation Summary  1.  Normal left ventricular size and systolic function, LVEF 50-55%.  2.  Moderate to severe concentric LVH.  3.  Grade 2 diastolic dysfunction.  4.  Normal right ventricular size and systolic function.  5.  Moderately increased left atrial volume index.  6.  Moderate calcification of the aortic valve with moderate aortic stenosis.  7.  Moderate aortic regurgitation.  8.  Mild mitral regurgitation.      Current medications:  Scheduled Meds:allopurinol, 100 mg, Oral, Daily  arformoterol, 15 mcg, Nebulization, BID - RT   And  budesonide,  0.5 mg, Nebulization, BID - RT   And  revefenacin, 175 mcg, Nebulization, Daily - RT  buPROPion SR, 150 mg, Oral, BID  cetirizine, 5 mg, Oral, Daily  cholecalciferol, 1,000 Units, Oral, Daily  dexAMETHasone, 4 mg, Intravenous, Q6H  DULoxetine, 60 mg, Oral, Daily  ferrous sulfate, 325 mg, Oral, Daily With Breakfast  fluticasone, 2 spray, Each Nare, Daily  heparin (porcine), 5,000 Units, Subcutaneous, Q8H  HYDROcodone-acetaminophen, 1 tablet, Oral, Q4H  insulin regular, 2-7 Units, Subcutaneous, Q6H  isosorbide mononitrate, 30 mg, Oral, QAM  levothyroxine, 50 mcg, Oral, Daily  montelukast, 10 mg, Oral, Nightly  multivitamin with minerals, 1 tablet, Oral, Daily  pantoprazole, 40 mg, Oral, Q AM  pregabalin, 75 mg, Oral, BID  sodium chloride, 10 mL, Intravenous, Q12H  valsartan, 160 mg, Oral, Daily  vitamin B-12, 1,000 mcg, Oral, Daily      Continuous Infusions:   PRN Meds:.  acetaminophen **OR** acetaminophen **OR** acetaminophen    albuterol    senna-docusate sodium **AND** polyethylene glycol **AND** bisacodyl **AND** bisacodyl    Calcium Replacement - Follow Nurse / BPA Driven Protocol    dextrose    dextrose    [Held by provider] furosemide    glucagon (human recombinant)    HYDROmorphone **AND** naloxone    Magnesium Standard Dose Replacement - Follow Nurse / BPA Driven Protocol    Morphine **AND** naloxone    ondansetron ODT    Phosphorus Replacement - Follow Nurse / BPA Driven Protocol    Potassium Replacement - Follow Nurse / BPA Driven Protocol    sodium chloride    sodium chloride    tiZANidine    Assessment & Plan   Assessment & Plan     Active Hospital Problems    Diagnosis  POA    **Spinal stenosis [M48.00]  Yes    Aortic valve stenosis and insufficiency, rheumatic [I06.2]  Yes    CAD (coronary artery disease) [I25.10]  Yes    Chronic diastolic heart failure [I50.32]  Yes    KALYN (obstructive sleep apnea) [G47.33]  Yes    CKD (chronic kidney disease), stage III [N18.30]  Yes    Hypothyroidism [E03.9]  Yes       Resolved Hospital Problems   No resolved problems to display.        Brief Hospital Course to date:  Raquel Mariee is a 69 y.o. female with history of HFpEF, CVID on IVIg monthly, DMII, Neuropathy, anemia, GERD, depression and hypothyroid who presents with back and radicular pain in the right leg.    Lumbar radiculopathy  - patient seen by Neurosurgery  - disc extrusion at L2/3  - continue conservative management, Lyrica added  - PT/OT  - follow up with Neurosurgery in clinic as an outpatient    Hyponatremia  - sodium 127 at admission, improved to 131 today  - asymptomatic  - serum Osm 281  - diuretic held    Asthma  - continue nebs    UTI vs asymptomatic bacteriuria  - 100K GNR  - afebrile, no leukocytosis  - multiple drug allergies    HTN    HFpEF    DMII  Neuropathy    CVID  - on monthly IVIg    Iron def anemia    GERD    Depression    Hypothyroid  - levothyroxine  - TSH 1.4    Expected Discharge Location and Transportation:   Expected Discharge   Expected discharge date/ time has not been documented.     VTE Prophylaxis:  Pharmacologic VTE prophylaxis orders are present.         AM-PAC 6 Clicks Score (PT): 20 (07/04/25 2102)    CODE STATUS:   Code Status and Medical Interventions: CPR (Attempt to Resuscitate); Full Support   Ordered at: 07/04/25 2236     Code Status (Patient has no pulse and is not breathing):    CPR (Attempt to Resuscitate)     Medical Interventions (Patient has pulse or is breathing):    Full Support       Yuniel Verduzco MD  07/05/25

## 2025-07-05 NOTE — CONSULTS
Reason for consultation: Lumbar radiculopathy    Referring Physician:  Vahe Butler PA-C     Chief complaint low back pain with radiation into the right lower extremity    Admit Diagnosis:   Spinal stenosis [M48.00]     History of present illness:  This is a 69-year-old female who presented to the [facility] ED yesterday evening with a several day history of worsening low back pain with radiation into the right lower extremity to the point where it was difficult for her to ambulate.  She was also reporting some episodes of fecal incontinence and she does have a history of known stress urinary incontinence but feels that it had gotten worse over the past week.  Patient denied any symptoms of numbness/tingling, or saddle anesthesia.  She does feel weak in her right lower extremity secondary to the pain.  CT scan of the lumbar spine did reveal multilevel degenerative changes throughout.  Neurosurgical consultation was ultimately requested and we recommended MRI of the lumbar spine, however their machine was down.  She was ultimately transferred to our facility to undergo MRI.  During my bedside visit today, she reports right lower extremity pain to the point where it is difficult for her to ambulate.  She denies saddle anesthesia.  She does feel weak in the right lower extremity but reports that this is mostly secondary to her radiating radicular pain.    ROS:  A 12 point review of systems was performed.  All systems reviewed were negative with the exception of those mentioned in the history of present illness.    Past medical history:  Past Medical History:   Diagnosis Date    Anxiety     Aortic valve stenosis     Cardiac murmur     Cataract, bilateral     CHF (congestive heart failure)     Cholelithiasis GB out    Chronic kidney disease     Stage III    Clostridium difficile infection     in her 30s    Colon polyp Ever since having colonoscopies.    Common variable immunodeficiency     IVIG infusions     Compression fracture of lumbar spine, non-traumatic     COPD (chronic obstructive pulmonary disease)     Coronary artery disease     Prinz metal angina & aortic stenosis    Depression     Diabetes mellitus     type II    Eosinophilic asthma     Fibromyalgia, primary     Full dentures     GERD (gastroesophageal reflux disease)     History of transfusion     in [facility] at [facility].  No reaction noted, patient states she does have an antibody from transfusion.    Hypertension     Hypogammaglobulinemia     Hypothyroidism     Irritable bowel syndrome     Migraines     Morbid obesity     Optic neuritis     Optic neuropathy     Osteoarthritis     Prinzmetal angina 1990    Pseudomonas infection 1998    lungs    PTSD (post-traumatic stress disorder)     Pulmonary edema     Renal insufficiency     Sinus tachycardia 1990    Sleep apnea     no longer uses/needs cpap    Stroke     TIA about 7 years ago    Tachycardia     TIA (transient ischemic attack) 2017    Trochanteric bursitis 01/25/2023       Past surgical history:  Past Surgical History:   Procedure Laterality Date    APPENDECTOMY      BUNIONECTOMY Bilateral     CARDIAC CATHETERIZATION  2017    no stents needed    CARPAL TUNNEL RELEASE Right     COLONOSCOPY  2021    ENDOMETRIAL ABLATION  1997    EYE SURGERY  ? About 6-8 years ago    Laser for glaucoma    FRACTURE SURGERY  1995    No hardware; Tibeal plateau    GASTRIC BYPASS      HAND SURGERY Left     No hardware    HEMORRHOIDECTOMY N/A 04/09/2024    Procedure: HEMORRHOID BANDING X2;  Surgeon: Melissa Beck MD;  Location: [facility] OR;  Service: General;  Laterality: N/A;    INTERSTIM PLACEMENT N/A 05/03/2023    Procedure: INTERSTIM STAGES 1 AND 2 LEAD AND GENERATOR PLACEMENT;  Surgeon: Abner Nation MD;  Location: [facility] OR;  Service: Urology;  Laterality: N/A;    POSTERIOR VAGINAL REPAIR N/A 08/02/2023    Procedure: POSTERIOR COLPORRHAPHY;  Surgeon: Kellen Galan MD;  Location:  [facility]  OR;  Service: Gynecology;  Laterality: N/A;    RECTAL EXAMINATION UNDER ANESTHESIA N/A 04/09/2024    Procedure: RECTAL EXAM UNDER ANESTHESIA;  Surgeon: Melissa Beck MD;  Location: [facility];  Service: General;  Laterality: N/A;    REPLACEMENT TOTAL KNEE BILATERAL      TEETH EXTRACTION      all of bottom teeth removed    THORACOTOMY      TONSILLECTOMY      TOTAL ABDOMINAL HYSTERECTOMY WITH SALPINGO OOPHORECTOMY      TRACHEAL SURGERY      Repaired via thoracotomy    TUBAL ABDOMINAL LIGATION  1983    VENOUS ACCESS DEVICE (PORT) INSERTION      port a cath in the left chest wall       Social history:  Social History     Socioeconomic History    Marital status:    Tobacco Use    Smoking status: Never     Passive exposure: Never    Smokeless tobacco: Never   Vaping Use    Vaping status: Never Used   Substance and Sexual Activity    Alcohol use: Yes     Comment: ONCE A YEAR    Drug use: Never    Sexual activity: Defer       Family history:  Family History   Problem Relation Age of Onset    Diabetes Mother     Stroke Mother     Heart disease Mother     Hypertension Mother     Endometriosis Mother     Depression Mother     Diabetes Father     Hypertension Father     Stroke Father     Heart attack Father     Asthma Father     COPD Father     Dementia Father     Heart disease Father     COPD Sister     Asthma Sister     Cancer Sister         Nasal cell skin    Brain cancer Sister     Diabetes Sister     Stroke Sister     Heart attack Sister     Endometriosis Sister     Depression Sister     Arthritis Sister         Rheumatoid    Hypertension Sister     Kidney disease Sister     Diabetes Sister     COPD Sister     Asthma Sister     Endometriosis Sister     Depression Sister     Cancer Sister         Glioma    Heart disease Sister         MI    Heart attack Sister     Endometriosis Sister     Asthma Sister     Hypertension Sister     Kidney disease Sister         ESRD    COPD Brother     Asthma Brother         Turned  into COPD    Diabetes Brother     Asthma Brother     COPD Brother     Diabetes Brother     Hypertension Brother     Asthma Daughter     Endometriosis Daughter     Osteoarthritis Daughter     Hypertension Daughter     Kidney failure Daughter     Irritable bowel syndrome Daughter     Anxiety disorder Daughter     Bipolar disorder Daughter     Depression Daughter     Self-Injurious Behavior  Daughter     Suicide Attempts Daughter     Mental illness Daughter         Bipolar and eating fisorder    Asthma Daughter     Endometriosis Daughter     Osteoarthritis Daughter     Asthma Son     ADD / ADHD Son     Alcohol abuse Son     Drug abuse Son     Breast cancer Maternal Cousin     Heart disease Maternal Grandfather     Heart disease Maternal Uncle     OCD Neg Hx     Paranoid behavior Neg Hx     Schizophrenia Neg Hx     Seizures Neg Hx     Ovarian cancer Neg Hx        Allergies:  Allergies   Allergen Reactions    Cefaclor Hives and Swelling     Other reaction(s): SWELLING  Shed 4 layers of skin and extremely SOB  Cesario Bishop's syndrome        Cephalexin Other (See Comments)     Cesario-Bishop's syndrome      Cephalosporins Hives, Swelling and Angioedema     Rechallenge with cefipime caused rash on 1/14/08.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    Verapamil Nausea And Vomiting     Dizzy    Zolpidem Other (See Comments) and Unknown (See Comments)     Automatic behaviour in sleep.  confusion    Amoxicillin Rash     Edecrin [Ethacrynic Acid] Rash and Other (See Comments)     Weight gain    Hydrochlorothiazide Hives    Sulfa Antibiotics Hives       Outpatient medications:  Scheduled Meds:allopurinol, 100 mg, Oral, Daily  arformoterol, 15 mcg, Nebulization, BID - RT   And  budesonide, 0.5 mg, Nebulization, BID - RT   And  revefenacin, 175 mcg, Nebulization, Daily - RT  buPROPion SR, 150 mg, Oral, BID  cetirizine, 5 mg, Oral, Daily  cholecalciferol, 1,000 Units, Oral, Daily  dexAMETHasone, 4 mg, Intravenous, Q6H  DULoxetine, 60 mg, Oral, Daily  ferrous sulfate, 325 mg, Oral, Daily With Breakfast  fluticasone, 2 spray, Each Nare, Daily  heparin (porcine), 5,000 Units, Subcutaneous, Q8H  insulin regular, 2-7 Units, Subcutaneous, Q6H  isosorbide mononitrate, 30 mg, Oral, QAM  levothyroxine, 50 mcg, Oral, Daily  montelukast, 10 mg, Oral, Nightly  multivitamin with minerals, 1 tablet, Oral, Daily  pantoprazole, 40 mg, Oral, Q AM  pregabalin, 75 mg, Oral, BID  sodium chloride, 10 mL, Intravenous, Q12H  valsartan, 160 mg, Oral, Daily  vitamin B-12, 1,000 mcg, Oral, Daily      Continuous Infusions:   PRN Meds:.  acetaminophen **OR** acetaminophen **OR** acetaminophen    albuterol    senna-docusate sodium **AND** polyethylene glycol **AND** bisacodyl **AND** bisacodyl    Calcium Replacement - Follow Nurse / BPA Driven Protocol    dextrose    dextrose    [Held by provider] furosemide    glucagon (human recombinant)    HYDROmorphone **AND** naloxone    Magnesium Standard Dose Replacement - Follow Nurse / BPA Driven Protocol    Morphine **AND** naloxone    ondansetron ODT    Phosphorus Replacement - Follow Nurse / BPA Driven Protocol    Potassium Replacement - Follow Nurse / BPA Driven Protocol    sodium chloride    sodium chloride    tiZANidine    Physical Examination:  General: Awake, alert, pleasant and conversant.  No acute distress.  Vitals:    07/05/25 1128   BP: 158/84   Pulse: 88   Resp:    Temp:    SpO2: 96%     Systolic (24hrs),  Avg:160 , Min:149 , Max:185     Diastolic (24hrs), Av, Min:78, Max:98    Pulse  Av.9  Min: 75  Max: 93    Temp (24hrs), Av.9 °F (36.6 °C), Min:97.5 °F (36.4 °C), Max:98.1 °F (36.7 °C)      Neurological: Cranial nerves II through XII are grossly intact.  Proprioception intact.  Sensation is intact to light touch throughout.  She reports radicular pain that radiates from her low back down into her right thigh stopping at the knee.  Musculoskeletal: 5 out of 5 strength both proximally and distally in all 4 extremities but patient does report pain with movement in the right lower extremity.  Vascular: 2+ radial pulses bilaterally.  Cardiovascular: Regular rate and rhythm.  Extremities: No clubbing, cyanosis, or edema.  Pulmonary: Normal effort and excursion.  No evidence of respiratory distress.  Psychiatric: Normal mood and affect  HEENT: Normocephalic, atraumatic.  Eyes: No scleral icterus.  Extraocular eye movements are intact, and pupils are equal, round, and reactive to light.    Imaging:  All imaging has been reviewed and independently interpreted.  MRIs of the lumbar thoracic and cervical spine reviewed along with CT scan of the lumbar spine.  There are multilevel degenerative changes throughout with no evidence of significant canal stenosis.  I did have for my comparison a recent study from 2025 and do not appreciate much in the way of change.  At L2-3 there is a large broad-based disc bulge eccentric to the right contributing to moderate borderline severe bilateral neuroforaminal narrowing.      Assessment and Plan:    Spinal stenosis [M48.00]     This is a 69-year-old female with a several week history of worsening low back pain with radiation into the right lower extremity, consistent with lumbar radiculopathy.  Her imaging reveals multilevel degenerative changes throughout, worse at the L2-3 level where there is a right paracentral disc extrusion contributing to moderate borderline severe  right lateral recess stenosis.  She does have multiple medical comorbidities and is somewhat of a high risk surgical candidate.  There was concern for cauda equina syndrome, however I would like to rule that out today.  She may be a candidate for a lumbar fusion in the future but considering her multiple medical comorbidities I would like to exhaust all conservative treatments prior to considering neurosurgical intervention.  I am going to start her on some Lyrica to help with her radiating leg pain.  At some point in the future, I would consider ordering a lumbar myelogram to further assess the degree of stenosis.  I would like for her to work with physical therapy as well.  We can lift n.p.o. and she can eat.  Continue pain control.  Had a lengthy and protracted conversation with the patient and her  at the bedside this morning.  There is currently no role during this hospitalization for urgent or emergent neurosurgical intervention.  Once we are able to get her pain under control, then I would like to establish outpatient follow-up.  All questions answered at the bedside.  Appreciate the consult.    Lakshmi Larios PA-C

## 2025-07-05 NOTE — THERAPY EVALUATION
Patient Name: Raquel Mariee  : 1955    MRN: [id number]                              Today's Date: 2025       Admit Date: 2025    Visit Dx: No diagnosis found.  Patient Active Problem List   Diagnosis    Hypogammaglobulinemia    Gastric bypass status for obesity    CKD (chronic kidney disease), stage III    KALYN (obstructive sleep apnea)    Major depressive disorder in partial remission    Hypothyroidism    Fibromyalgia syndrome    Abdominal aortic aneurysm (AAA) without rupture    Nonrheumatic aortic valve stenosis    Vitamin D deficiency, unspecified     Anatomical narrow angle borderline glaucoma    Abnormal finding of blood chemistry, unspecified     Chronic diastolic heart failure    Prinzmetal angina    Common variable agammaglobulinemia    Polymyositis    Port-A-Cath in place    Abnormal CT of the chest    Acute bronchospasm    Acute kidney injury    Sinusitis, chronic    Anemia    Aortic valve stenosis and insufficiency, rheumatic    Body mass index (BMI) of 40.0 to 44.9 in adult    Calcium kidney stones    Choroidal nevus of left eye    CAD (coronary artery disease)    Claw toe, acquired    Combined forms of age-related cataract of both eyes    Depressive disorder    Essential hypertension with goal blood pressure less than 140/90    Severe persistent asthma with exacerbation    Glaucoma suspect of both eyes    Inappropriate sinus tachycardia    Migraine without aura    Mitral valve regurgitation    Osteoarthrosis involving lower leg    Pain in joint involving lower leg    Posterior vitreous detachment of both eyes    Primary osteoarthritis of left wrist    Anxiety disorder    Status post total knee replacement    Sleep disturbances    Senile nuclear sclerosis    Rotator cuff syndrome of left shoulder    Pure hypercholesterolemia    Thoracic aortic aneurysm without rupture    TIA (transient ischemic attack)    Tracheomalacia    Trochlear nerve palsy, left    Diabetes mellitus without  complication    Urge incontinence of urine    Acute exacerbation of chronic obstructive pulmonary disease (COPD)    Internal hemorrhoid, bleeding    Spinal stenosis     Past Medical History:   Diagnosis Date    Anxiety     Aortic valve stenosis     Cardiac murmur     Cataract, bilateral     CHF (congestive heart failure)     Cholelithiasis GB out    Chronic kidney disease     Stage III    Clostridium difficile infection     in her 30s    Colon polyp Ever since having colonoscopies.    Common variable immunodeficiency     IVIG infusions    Compression fracture of lumbar spine, non-traumatic     COPD (chronic obstructive pulmonary disease)     Coronary artery disease     Prinz metal angina & aortic stenosis    Depression     Diabetes mellitus     type II    Eosinophilic asthma     Fibromyalgia, primary     Full dentures     GERD (gastroesophageal reflux disease)     History of transfusion     in [facility] at [facility].  No reaction noted, patient states she does have an antibody from transfusion.    Hypertension     Hypogammaglobulinemia     Hypothyroidism     Irritable bowel syndrome     Migraines     Morbid obesity     Optic neuritis     Optic neuropathy     Osteoarthritis     Prinzmetal angina 1990    Pseudomonas infection 1998    lungs    PTSD (post-traumatic stress disorder)     Pulmonary edema     Renal insufficiency     Sinus tachycardia 1990    Sleep apnea     no longer uses/needs cpap    Stroke     TIA about 7 years ago    Tachycardia     TIA (transient ischemic attack) 2017    Trochanteric bursitis 01/25/2023     Past Surgical History:   Procedure Laterality Date    APPENDECTOMY      BUNIONECTOMY Bilateral     CARDIAC CATHETERIZATION  2017    no stents needed    CARPAL TUNNEL RELEASE Right     COLONOSCOPY  2021    ENDOMETRIAL ABLATION  1997    EYE SURGERY  ? About 6-8 years ago    Laser for glaucoma    FRACTURE SURGERY  1995    No hardware; Tibeal plateau    GASTRIC BYPASS      HAND SURGERY Left      No hardware    HEMORRHOIDECTOMY N/A 04/09/2024    Procedure: HEMORRHOID BANDING X2;  Surgeon: Melissa Beck MD;  Location: [facility];  Service: General;  Laterality: N/A;    INTERSTIM PLACEMENT N/A 05/03/2023    Procedure: INTERSTIM STAGES 1 AND 2 LEAD AND GENERATOR PLACEMENT;  Surgeon: Abner Nation MD;  Location: [facility] OR;  Service: Urology;  Laterality: N/A;    POSTERIOR VAGINAL REPAIR N/A 08/02/2023    Procedure: POSTERIOR COLPORRHAPHY;  Surgeon: Kellen Galan MD;  Location:  [facility];  Service: Gynecology;  Laterality: N/A;    RECTAL EXAMINATION UNDER ANESTHESIA N/A 04/09/2024    Procedure: RECTAL EXAM UNDER ANESTHESIA;  Surgeon: Melissa Beck MD;  Location: [facility];  Service: General;  Laterality: N/A;    REPLACEMENT TOTAL KNEE BILATERAL      TEETH EXTRACTION      all of bottom teeth removed    THORACOTOMY      TONSILLECTOMY      TOTAL ABDOMINAL HYSTERECTOMY WITH SALPINGO OOPHORECTOMY      TRACHEAL SURGERY      Repaired via thoracotomy    TUBAL ABDOMINAL LIGATION  1983    VENOUS ACCESS DEVICE (PORT) INSERTION      port a cath in the left chest wall      General Information       Row Name 07/05/25 8152          Physical Therapy Time and Intention    Document Type evaluation  -SC     Mode of Treatment physical therapy  -SC       Row Name 07/05/25 8364          General Information    Patient Profile Reviewed yes  -SC     Prior Level of Function independent:;gait;transfer;all household mobility  has been using a rolling walker for 5 mts. No falls  -SC     Existing Precautions/Restrictions spinal;fall  -SC     Barriers to Rehab previous functional deficit;medically complex  -SC       Row Name 07/05/25 9268          Living Environment    Current Living Arrangements home  -SC     People in Home spouse  -SC       Row Name 07/05/25 1350          Home Main Entrance    Number of Stairs, Main Entrance none  -SC       Row Name 07/05/25 1354          Stairs Within Home, Primary    Number of  Stairs, Within Home, Primary none  -SC       Row Name 07/05/25 1359          Cognition    Orientation Status (Cognition) oriented x 4  -SC       Row Name 07/05/25 1359          Safety Issues/Impairments Affecting Functional Mobility    Impairments Affecting Function (Mobility) endurance/activity tolerance;pain;postural/trunk control;motor control  -SC     Comment, Safety Issues/Impairments (Mobility) alert, following commands  -SC               User Key  (r) = Recorded By, (t) = Taken By, (c) = Cosigned By      Initials Name Provider Type    SC Apolinar Butts PT Physical Therapist                   Mobility       Row Name 07/05/25 1401          Bed Mobility    Bed Mobility supine-sit;scooting/bridging  -SC     Scooting/Bridging [place] (Bed Mobility) independent  -SC     Supine-Sit [place] (Bed Mobility) modified independence  -SC     Assistive Device (Bed Mobility) head of bed elevated;bed rails  -SC     Comment, (Bed Mobility) able to get up with help of bed rails. Does not log roll  -[facility] Name 07/05/25 1401          Transfers    Comment, (Transfers) Cues for hand placemenet. Demonstrated slow transfers  -[facility] Name 07/05/25 1401          Bed-Chair Transfer    Bed-Chair [place] (Transfers) 1 person assist;minimum assist (75% patient effort);verbal cues  -SC     Assistive Device (Bed-Chair Transfers) walker, front-wheeled  -[facility] Name 07/05/25 1401          Sit-Stand Transfer    Sit-Stand [place] (Transfers) contact guard;1 person assist  -SC     Comment, (Sit-Stand Transfer) c/o increased R leg pain with standing  -[facility] Name 07/05/25 1401          Gait/Stairs (Locomotion)    [place] Level (Gait) 1 person assist;minimum assist (75% patient effort);verbal cues  -SC     Assistive Device (Gait) walker, front-wheeled  -SC     Distance in Feet (Gait) 6  -SC     Deviations/Abnormal Patterns (Gait) right sided deviations;stride length decreased;weight shifting  decreased;gait speed decreased  -SC     Bilateral Gait Deviations forward flexed posture  -SC     Comment, (Gait/Stairs) WOrked on side stepping along edge of bed. Difficulty wt shifting to R with increase in R leg pain requiring sit down rest. The improved symptoms  -SC               User Key  (r) = Recorded By, (t) = Taken By, (c) = Cosigned By      Initials Name Provider Type    SC Apolinar Butts, PT Physical Therapist                   Obj/Interventions       [facility] Name 07/05/25 1405          Range of Motion Comprehensive    General Range of Motion neck/trunk range of motion deficits identified;bilateral lower extremity ROM WFL  -[facility] Name 07/05/25 1405          Strength Comprehensive (MMT)    Comment, General Manual Muscle Testing (MMT) Assessment B LE: quad 4/5, tib ant 4/5,  -[facility] Name 07/05/25 1405          Motor Skills    Therapeutic Exercise hip;knee;ankle  -SC       Row Name 07/05/25 1405          Hip (Therapeutic Exercise)    Hip (Therapeutic Exercise) AROM (active range of motion)  R knee to chest stretch in suping and sitting trunk flexion with B elbows on thighs x 2 minutes  -SC     Hip AROM (Therapeutic Exercise) bilateral;flexion;aDduction;extension;aBduction;supine  assisted bent knee fall outs  -[facility] Name 07/05/25 1405          Knee (Therapeutic Exercise)    Knee (Therapeutic Exercise) isometric exercises  -SC     Knee Isometrics (Therapeutic Exercise) bilateral;gluteal sets;quad sets;10 second hold;10 repetitions  -SC       Row Name 07/05/25 1405          Ankle (Therapeutic Exercise)    Ankle (Therapeutic Exercise) AROM (active range of motion)  -SC     Ankle AROM (Therapeutic Exercise) bilateral;dorsiflexion;plantarflexion;10 repetitions  -SC       Row Name 07/05/25 1405          Balance    Balance Assessment standing dynamic balance  -SC     Dynamic Standing Balance minimal assist;1 person to manage equipment;verbal cues  -SC     Position/Device Used, Standing Balance  walker, rolling  -SC     Comment, Balance R leg pain increases with standing and unable to tolerate standing for long  -SC       Row Name 07/05/25 1405          Sensory Assessment (Somatosensory)    Sensory Assessment (Somatosensory) other (see comments)  some numbness under feet, R worse  -SC               User Key  (r) = Recorded By, (t) = Taken By, (c) = Cosigned By      Initials Name Provider Type    SC Benjamín, Shearon A, PT Physical Therapist                   Goals/Plan       Row Name 07/05/25 1418          Bed Mobility Goal 1 (PT)    Activity/Assistive Device (Bed Mobility Goal 1, PT) sit to supine/supine to sit  -SC     Bent Level/Cues Needed (Bed Mobility Goal 1, PT) modified independence  -SC     Time Frame (Bed Mobility Goal 1, PT) long term goal (LTG);3 days  -SC       Row Name 07/05/25 1418          Transfer Goal 1 (PT)    Activity/Assistive Device (Transfer Goal 1, PT) sit-to-stand/stand-to-sit;walker, rolling  -SC     Bent Level/Cues Needed (Transfer Goal 1, PT) modified independence  -SC     Time Frame (Transfer Goal 1, PT) long term goal (LTG);3 days  -SC       Row Name 07/05/25 1418          Gait Training Goal 1 (PT)    Activity/Assistive Device (Gait Training Goal 1, PT) gait (walking locomotion);walker, rolling  -SC     Bent Level (Gait Training Goal 1, PT) modified independence  -SC     Distance (Gait Training Goal 1, PT) 150  -SC     Time Frame (Gait Training Goal 1, PT) long term goal (LTG);3 days  -SC       Row Name 07/05/25 1418          Therapy Assessment/Plan (PT)    Planned Therapy Interventions (PT) balance training;bed mobility training;gait training;home exercise program;ROM (range of motion);patient/family education;strengthening;stretching;transfer training  -SC               User Key  (r) = Recorded By, (t) = Taken By, (c) = Cosigned By      Initials Name Provider Type    SC Benjamín, Shearon A, PT Physical Therapist                   Clinical Impression       [facility]  Name 07/05/25 1408          Pain    Pain Location extremity  -SC     Pain Side/Orientation lower;right  -SC     Pain Management Interventions positioning techniques utilized  -SC     Response to Pain Interventions activity participation with tolerable pain  -SC     Pre/Posttreatment Pain Comment r radicular pain improves with hip and trunk flexion, sitting with elbows on thighs with flexed spine  -SC       Row Name 07/05/25 1408          Plan of Care Review    Plan of Care Reviewed With patient  -SC     Outcome Evaluation Patient presents with R radicular leg pain that is increased with trunk extension. Patient is limited to just transfers with walker about 4-6 feet. Further ambualtion limited by pain. Recommend continued skilled PT and  IPR at discharge. Also recommend OT consult.  -SC       Row Name 07/05/25 1408          Therapy Assessment/Plan (PT)    Patient/Family Therapy Goals Statement (PT) walk  -SC     Rehab Potential (PT) good  -SC     Criteria for Skilled Interventions Met (PT) yes;meets criteria  -SC     Therapy Frequency (PT) daily  -SC     Predicted Duration of Therapy Intervention (PT) 3  -SC               User Key  (r) = Recorded By, (t) = Taken By, (c) = Cosigned By      Initials Name Provider Type    SC Apolinar Butts, PT Physical Therapist                   Outcome Measures       Row Name 07/05/25 1418          How much help from another person do you currently need...    Turning from your back to your side while in flat bed without using bedrails? 4  -SC     Moving from lying on back to sitting on the side of a flat bed without bedrails? 3  -SC     Moving to and from a bed to a chair (including a wheelchair)? 3  -SC     Standing up from a chair using your arms (e.g., wheelchair, bedside chair)? 3  -SC     To walk in hospital room? 2  -SC       Row Name 07/05/25 1418          Functional Assessment    Outcome Measure Options AM-PAC 6 Clicks Basic Mobility (PT)  -SC               User Key  (r) =  Recorded By, (t) = Taken By, (c) = Cosigned By      Initials Name Provider Type    SC Apolinar Butts PT Physical Therapist                                 Physical Therapy Education       Title: PT OT SLP Therapies (Done)       Topic: Physical Therapy (Done)       Point: Mobility training (Done)       Learning Progress Summary            Patient Eager, E, VU by SC at 7/5/2025 1419    Comment: re idwed HEP                      Point: Home exercise program (Done)       Learning Progress Summary            Patient Eager, E, VU by SC at 7/5/2025 1419    Comment: re idwed HEP                      Point: Body mechanics (Done)       Learning Progress Summary            Patient Eager, E, VU by SC at 7/5/2025 1419    Comment: re idwed HEP                      Point: Precautions (Done)       Learning Progress Summary            Patient Eager, E, VU by SC at 7/5/2025 1419    Comment: re idwed HEP                                      User Key       Initials Effective Dates Name Provider Type Discipline    SC 02/03/23 -  Apolinar Butts PT Physical Therapist PT                  PT Recommendation and Plan  Planned Therapy Interventions (PT): balance training, bed mobility training, gait training, home exercise program, ROM (range of motion), patient/family education, strengthening, stretching, transfer training  Outcome Evaluation: Patient presents with R radicular leg pain that is increased with trunk extension. Patient is limited to just transfers with walker about 4-6 feet. Further ambualtion limited by pain. Recommend continued skilled PT and  IPR at discharge. Also recommend OT consult.     Time Calculation:   PT Evaluation Complexity  History, PT Evaluation Complexity: 3 or more personal factors and/or comorbidities  Examination of Body Systems (PT Eval Complexity): total of 3 or more elements  Clinical Presentation (PT Evaluation Complexity): evolving  Clinical Decision Making (PT Evaluation Complexity): moderate  complexity  Overall Complexity (PT Evaluation Complexity): moderate complexity     PT Charges       Row Name 07/05/25 1312             Time Calculation    Start Time 1312  -SC      PT Received On 07/05/25  -SC      PT Goal Re-Cert Due Date 07/15/25  -SC         Timed Charges    23344 - PT Therapeutic Exercise Minutes 18  -SC      36848 - Gait Training Minutes  5  -SC         Untimed Charges    PT Eval/Re-eval Minutes 45  -SC         Total Minutes    Timed Charges Total Minutes 23  -SC      Untimed Charges Total Minutes 45  -SC       Total Minutes 68  -SC                User Key  (r) = Recorded By, (t) = Taken By, (c) = Cosigned By      Initials Name Provider Type    SC Benjamín, Apolinar DELAROSA, PT Physical Therapist                  Therapy Charges for Today       Code Description Service Date Service Provider Modifiers Qty    66115215228 HC PT THER PROC EA 15 MIN 7/5/2025 Apolinar Butts, PT GP 1    04909402723 HC GAIT TRAINING EA 15 MIN 7/5/2025 Apolinar Butts, PT GP 1    27065108406 HC PT EVAL MOD COMPLEXITY 3 7/5/2025 Apolinar Butts, PT GP 1            PT G-Codes  Outcome Measure Options: AM-PAC 6 Clicks Basic Mobility (PT)  AM-PAC 6 Clicks Score (PT): 20  PT Discharge Summary  Anticipated Discharge Disposition (PT): inpatient rehabilitation facility    Apolinar Butts PT  7/5/2025

## 2025-07-05 NOTE — H&P
[facility] Medicine Services  HISTORY AND PHYSICAL    Patient Name: Raquel Mariee  : 1955  MRN: [id number]  Primary Care Physician: Sanjeev Rawls MD  Date of admission: 2025      Subjective   Subjective     Chief Complaint:  Lower back pain    HPI:  Raquel Mariee is a 69 y.o. female with hx asthma, HFpEF, CVID on IVIG monthly, T2DM here with back pain. She has known severe lumbar stenosis following with NSGY and orthopedics. Reports worsening 3d ago when she bent down to  something. Has radicular type pain radiating down right side making it hard to ambulate. Has had one episode of fecal incontinence and known stress urinary incontinence but states this has worsened over past week. Does not report saddle anesthesia or loss of sensation to legs. She was seen at [facility] ED where she was reported to have post void 160cc and decreased rectal tone, with CT showing diffuse DJD of lumbar spine but no cauda equina. MRI was unable to be performed so was transferred here, case was discussed with neurosurgery prior to transfer.      Personal History     Past Medical History:   Diagnosis Date    Anxiety     Aortic valve stenosis     Cardiac murmur     Cataract, bilateral     CHF (congestive heart failure)     Cholelithiasis GB out    Chronic kidney disease     Stage III    Clostridium difficile infection     in her 30s    Colon polyp Ever since having colonoscopies.    Common variable immunodeficiency     IVIG infusions    Compression fracture of lumbar spine, non-traumatic     COPD (chronic obstructive pulmonary disease)     Coronary artery disease     Prinz metal angina & aortic stenosis    Depression     Diabetes mellitus     type II    Eosinophilic asthma     Fibromyalgia, primary     Full dentures     GERD (gastroesophageal reflux disease)     History of transfusion     in  at [facility] [place].  No reaction noted, patient states she does have an antibody  from transfusion.    Hypertension     Hypogammaglobulinemia     Hypothyroidism     Irritable bowel syndrome     Migraines     Morbid obesity     Optic neuritis     Optic neuropathy     Osteoarthritis     Prinzmetal angina 1990    Pseudomonas infection 1998    lungs    PTSD (post-traumatic stress disorder)     Pulmonary edema     Renal insufficiency     Sinus tachycardia 1990    Sleep apnea     no longer uses/needs cpap    Stroke     TIA about 7 years ago    Tachycardia     TIA (transient ischemic attack) 2017    Trochanteric bursitis 01/25/2023           Past Surgical History:   Procedure Laterality Date    APPENDECTOMY      BUNIONECTOMY Bilateral     CARDIAC CATHETERIZATION  2017    no stents needed    CARPAL TUNNEL RELEASE Right     COLONOSCOPY  2021    ENDOMETRIAL ABLATION  1997    EYE SURGERY  ? About 6-8 years ago    Laser for glaucoma    FRACTURE SURGERY  1995    No hardware; Tibeal plateau    GASTRIC BYPASS      HAND SURGERY Left     No hardware    HEMORRHOIDECTOMY N/A 04/09/2024    Procedure: HEMORRHOID BANDING X2;  Surgeon: Melissa Beck MD;  Location: [facility] OR;  Service: General;  Laterality: N/A;    INTERSTIM PLACEMENT N/A 05/03/2023    Procedure: INTERSTIM STAGES 1 AND 2 LEAD AND GENERATOR PLACEMENT;  Surgeon: Abner Nation MD;  Location: [facility] OR;  Service: Urology;  Laterality: N/A;    POSTERIOR VAGINAL REPAIR N/A 08/02/2023    Procedure: POSTERIOR COLPORRHAPHY;  Surgeon: Kellen Galan MD;  Location:  [facility];  Service: Gynecology;  Laterality: N/A;    RECTAL EXAMINATION UNDER ANESTHESIA N/A 04/09/2024    Procedure: RECTAL EXAM UNDER ANESTHESIA;  Surgeon: Melissa Beck MD;  Location: [facility] OR;  Service: General;  Laterality: N/A;    REPLACEMENT TOTAL KNEE BILATERAL      TEETH EXTRACTION      all of bottom teeth removed    THORACOTOMY      TONSILLECTOMY      TOTAL ABDOMINAL HYSTERECTOMY WITH SALPINGO OOPHORECTOMY      TRACHEAL SURGERY      Repaired via thoracotomy    TUBAL  ABDOMINAL LIGATION  1983    VENOUS ACCESS DEVICE (PORT) INSERTION      port a cath in the left chest wall       Family History: family history includes ADD / ADHD in her son; Alcohol abuse in her son; Anxiety disorder in her daughter; Arthritis in her sister; Asthma in her brother, brother, daughter, daughter, father, sister, sister, sister, and son; Bipolar disorder in her daughter; Brain cancer in her sister; Breast cancer in her maternal cousin; COPD in her brother, brother, father, sister, and sister; Cancer in her sister and sister; Dementia in her father; Depression in her daughter, mother, sister, and sister; Diabetes in her brother, brother, father, mother, sister, and sister; Drug abuse in her son; Endometriosis in her daughter, daughter, mother, sister, sister, and sister; Heart attack in her father, sister, and sister; Heart disease in her father, maternal grandfather, maternal uncle, mother, and sister; Hypertension in her brother, daughter, father, mother, sister, and sister; Irritable bowel syndrome in her daughter; Kidney disease in her sister and sister; Kidney failure in her daughter; Mental illness in her daughter; Osteoarthritis in her daughter and daughter; Self-Injurious Behavior  in her daughter; Stroke in her father, mother, and sister; Suicide Attempts in her daughter.     Social History:  reports that she has never smoked. She has never been exposed to tobacco smoke. She has never used smokeless tobacco. She reports current alcohol use. She reports that she does not use drugs.  Social History     Social History Narrative    Not on file       Medications:  Available home medication information reviewed.  Budeson-Glycopyrrol-Formoterol, DULoxetine, Fluticasone Propionate, Hydrocortisone (Perianal), Magnesium, Mepolizumab, Sinus Rinse Kit, Sitz Bath, acetaminophen-codeine, albuterol sulfate HFA, allopurinol, betamethasone valerate, buPROPion SR, calcium carbonate, cholecalciferol, denosumab,  diphenhydrAMINE, estradiol, ferrous sulfate, fexofenadine, fluticasone-salmeterol, furosemide, glucosamine-chondroitin, guaiFENesin-codeine, immune globulin (human), ipratropium, ipratropium-albuterol, isosorbide mononitrate, levothyroxine, lidocaine, linaclotide, montelukast, multivitamin with minerals, omeprazole, ondansetron, potassium citrate, predniSONE, solifenacin, tiZANidine, tiotropium bromide monohydrate, ubrogepant, valsartan, and vitamin B-12    Allergies   Allergen Reactions    Cefaclor Hives and Swelling     Other reaction(s): SWELLING  Shed 4 layers of skin and extremely SOB  Cesario Bishop's syndrome        Cephalexin Other (See Comments)     Cesario-Bishop's syndrome      Cephalosporins Hives, Swelling and Angioedema     Rechallenge with cefipime caused rash on 1/14/08.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    Verapamil Nausea And Vomiting     Dizzy    Zolpidem Other (See Comments) and Unknown (See Comments)     Automatic behaviour in sleep.  confusion    Amoxicillin Rash    Edecrin [Ethacrynic Acid] Rash and Other (See Comments)     Weight gain    Hydrochlorothiazide Hives    Sulfa Antibiotics Hives       Objective   Objective     Vital Signs:   Temp:  [98 °F (36.7 °C)-98.1 °F (36.7 °C)] 98.1 °F (36.7 °C)  Heart Rate:  [81-93] 81  Resp:  [16-18] 16  BP: (139-185)/(78-98) 185/92       Physical Exam   AAOx3   EOMI PERRL  Neck flexion intact  Facial expression intact  Heart RRR  Lungs CTAB  Abd  soft, nontender  BL upper extremity strength intact, symmetric. Pulses symmetric  BL LE without sensory loss. No clonus. 4/5 strength RLE, 5/5 LLE. Patellar reflex 1+ BL. Pulses intact, symmetric    Result Review:  I have personally reviewed the results from the time of this admission to 7/4/2025 22:43 EDT and agree with these findings:  [x]  Laboratory list / accordion  []  Microbiology  [x]  Radiology  [x]  EKG/Telemetry   []  Cardiology/Vascular   []  Pathology  [x]  Old records  []  Other:  Most notable findings include: see A+P      LAB RESULTS:      Lab 07/04/25  1227   WBC 6.81   HEMOGLOBIN 12.1   HEMATOCRIT 35.4   PLATELETS 211   NEUTROS ABS 5.31   IMMATURE GRANS (ABS) 0.04   LYMPHS ABS 0.92   MONOS ABS 0.51   EOS ABS 0.00   MCV 93.7         Lab 07/04/25  1227   SODIUM 127*   POTASSIUM 4.5   CHLORIDE 93*   CO2 22.8   ANION GAP 11.2   BUN 15.0   CREATININE 0.95   EGFR 65.0   GLUCOSE 130*   CALCIUM 8.9         Lab 07/04/25  1227   TOTAL PROTEIN 7.0   ALBUMIN 3.8   GLOBULIN 3.2   ALT (SGPT) 18   AST (SGOT) 30   BILIRUBIN 0.4   ALK PHOS 44                     UA          7/4/2025    13:27   Urinalysis   Squamous Epithelial Cells, UA 3-6    Specific Gravity, UA 1.015    Ketones, UA Negative    Blood, UA Trace    Leukocytes, UA Small (1+)    Nitrite, UA Negative    RBC, UA 0-2    WBC, UA 6-10    Bacteria, UA Trace        Microbiology Results (last 10 days)       ** No results found for the last 240 hours. **            CT Abdomen Pelvis With Contrast  Result Date: 7/4/2025  PROCEDURE: CT ABDOMEN PELVIS W CONTRAST-  HISTORY:  Low back pain, urinary incontinence, fecal incontinence, eval distended bladder or bowel  COMPARISON:  None .  TECHNIQUE: Multiple axial CT images were obtained from the lung bases through the pubic symphysis following the administration of Isovue 300 contrast.  FINDINGS:  ABDOMEN: There is a trace right pleural effusion. There is right pleural thickening. Right middle lobe atelectasis is  seen. The heart is normal in size. There are postsurgical changes of gastric bypass. The liver is normal. Gallbladder surgically absent. The spleen is unremarkable. No adrenal mass is present.  The pancreas is normal. There is a 3 mm nonobstructing left renal stone. The aorta is normal in caliber. There is no free fluid or adenopathy.  A moderate amount of stool is seen in the transverse colon.  PELVIS: The appendix is not identified. There is sigmoid diverticulosis. The uterus is surgically absent. The urinary bladder is unremarkable. There is no significant free fluid or adenopathy.      Impression: Right pleural thickening and right middle lobe atelectasis may be inflammatory. Short interval follow-up chest CT is recommended.  Moderate amount of stool in the transverse colon is consistent with constipation.  Left nephrolithiasis without hydronephrosis. .   CTDI: 22.43 mGy DLP: 1130.99 mGy.cm   This study was performed with techniques to keep radiation doses as low as reasonably achievable (ALARA). Individualized dose reduction techniques using automated exposure control or adjustment of mA and/or kV according to the patient size were employed.      Images were reviewed, interpreted, and dictated by Dr. Kwaku Grant MD Transcribed by Leyda Rachel PA-C.  This report was signed and finalized on 7/4/2025 2:01 PM by Kwaku Grant MD.      CT Lumbar Spine Without Contrast  Result Date: 7/4/2025  PROCEDURE: CT LUMBAR SPINE WO CONTRAST-  HISTORY: Low back pain, urinary incontinence, eval fracture  TECHNIQUE: Multiple axial CT images were obtained through the lumbar spine using bone window algorithms. Coronal and sagittal images were reconstructed from the original axial data set.  FINDINGS: There is spaces are moderately diffusely degenerated. There is anterolisthesis of L4 on L5. There is no acute fracture.  There are disc bulges and facet hypertrophy throughout the lumbar spine. This results in canal  stenosis and neuroforaminal narrowing throughout the lower lumbar spine.          Impression: Degenerative disc disease.  No acute fracture.     DLP: 1130.99 mGy.cm CTDI: 22.43 mGy   This study was performed with techniques to keep radiation doses as low as reasonably achievable (ALARA). Individualized dose reduction techniques using automated exposure control or adjustment of mA and/or kV according to the patient size were employed.     Images were reviewed, interpreted, and dictated by Dr. Kwaku Grant MD Transcribed by Leyda Rachel PA-C.  This report was signed and finalized on 7/4/2025 2:01 PM by Kwaku Grant MD.        Results for orders placed in visit on 03/28/25    Adult Transthoracic Echo Complete W/ Cont if Necessary Per Protocol 03/31/2025  2:42 PM    Interpretation Summary  1.  Normal left ventricular size and systolic function, LVEF 50-55%.  2.  Moderate to severe concentric LVH.  3.  Grade 2 diastolic dysfunction.  4.  Normal right ventricular size and systolic function.  5.  Moderately increased left atrial volume index.  6.  Moderate calcification of the aortic valve with moderate aortic stenosis.  7.  Moderate aortic regurgitation.  8.  Mild mitral regurgitation.      Assessment & Plan   Assessment & Plan       Spinal stenosis    CKD (chronic kidney disease), stage III    KALYN (obstructive sleep apnea)    Hypothyroidism    Chronic diastolic heart failure    Aortic valve stenosis and insufficiency, rheumatic    CAD (coronary artery disease)    Lumbar stenosis  -known severe lumbar stenosis with worsening 3d ago, has had one episode of fecal incontinence and known stress urinary incontinence but states this has worsened over past week. Does not report saddle anesthesia or loss of sensation to legs. Exam findings mainly reveal RLE weakness, although limited by radicular type pain. Unable to get MRI so she was transferred here, NSGY made aware.   -STAT MRIs ordered  -start  decadron  -Discussed with on call NSGY, will let them know once images available    Hyponatremia  -acute on chronic, baseline 132, 133. Currently 127. Looks euvolemic on exam. Unclear etiology at this time. Will hold home diuretic and check TSH, urine studies.    Asthma  -stable, continue home inhalers    HTN  -restart home meds    HFpEF  -stable, holding furosemide 2/2 hyponatremia    T2DM w/ neuropathy  -start ssi. Cotninue duloxetine    CVID  -on chronic IVIG infusions    Hypothyroidism  -synthroid, checking TSH    ALMAZ  -iron supplements    GERD  -ppi    Depression  -c/w home meds        VTE Prophylaxis:  Pharmacologic VTE prophylaxis orders are present.          CODE STATUS:    Code Status and Medical Interventions: CPR (Attempt to Resuscitate); Full Support   Ordered at: 07/04/25 7366     Code Status (Patient has no pulse and is not breathing):    CPR (Attempt to Resuscitate)     Medical Interventions (Patient has pulse or is breathing):    Full Support       Expected Discharge   Expected discharge date/ time has not been documented.     Aguilar Mcgarry MD  07/04/25

## 2025-07-05 NOTE — PLAN OF CARE
Goal Outcome Evaluation:  Plan of Care Reviewed With: patient           Outcome Evaluation: Patient presents with R radicular leg pain that is increased with trunk extension. Patient is limited to just transfers with walker about 4-6 feet. Further ambualtion limited by pain. Recommend continued skilled PT and  IPR at discharge. Also recommend OT consult.    Anticipated Discharge Disposition (PT): inpatient rehabilitation facility

## 2025-07-05 NOTE — PLAN OF CARE
Patient c/o constant sharp / stabbing pain at lower back radiating down to right knee, exacerbated on movement / activities. requires IV analgesics for adequate pain control.   SBP significantly elevated. SR on cardiac mx. Ambulates with one assist. Voids independently. Call light in reach. Spouse at bedside.

## 2025-07-05 NOTE — PLAN OF CARE
Goal Outcome Evaluation:  Plan of Care Reviewed With: patient        Progress: no change  Outcome Evaluation: OT evaluation complete. Pt presents with weakness, acute pain, decreased activity tolerance, and decreased independence with ADLs. Pt performed bed mobility with Mod I and was able to stand with RWx and SBA, however, unable to tolerate additional activity secondary to RLE pain. Pt required Max A to don socks secondary to pain. Pt provided and educated on AE to use as needed to help promote pain relief with ADLs. Pt would benefit from skilled IPOT services to improve fxnl status. OT recommends IRF at discharge.    Anticipated Discharge Disposition (OT): inpatient rehabilitation facility

## 2025-07-06 LAB
BACTERIA SPEC AEROBE CULT: ABNORMAL
GLUCOSE BLDC GLUCOMTR-MCNC: 110 MG/DL (ref 70–130)
GLUCOSE BLDC GLUCOMTR-MCNC: 134 MG/DL (ref 70–130)
GLUCOSE BLDC GLUCOMTR-MCNC: 154 MG/DL (ref 70–130)
GLUCOSE BLDC GLUCOMTR-MCNC: 170 MG/DL (ref 70–130)

## 2025-07-06 PROCEDURE — 94664 DEMO&/EVAL PT USE INHALER: CPT

## 2025-07-06 PROCEDURE — 82948 REAGENT STRIP/BLOOD GLUCOSE: CPT

## 2025-07-06 PROCEDURE — 99232 SBSQ HOSP IP/OBS MODERATE 35: CPT | Performed by: INTERNAL MEDICINE

## 2025-07-06 PROCEDURE — 25010000002 HEPARIN (PORCINE) PER 1000 UNITS: Performed by: STUDENT IN AN ORGANIZED HEALTH CARE EDUCATION/TRAINING PROGRAM

## 2025-07-06 PROCEDURE — 97116 GAIT TRAINING THERAPY: CPT

## 2025-07-06 PROCEDURE — 94799 UNLISTED PULMONARY SVC/PX: CPT

## 2025-07-06 PROCEDURE — 94761 N-INVAS EAR/PLS OXIMETRY MLT: CPT

## 2025-07-06 PROCEDURE — 63710000001 INSULIN REGULAR HUMAN PER 5 UNITS: Performed by: STUDENT IN AN ORGANIZED HEALTH CARE EDUCATION/TRAINING PROGRAM

## 2025-07-06 PROCEDURE — 97110 THERAPEUTIC EXERCISES: CPT

## 2025-07-06 PROCEDURE — 63710000001 REVEFENACIN 175 MCG/3ML SOLUTION: Performed by: STUDENT IN AN ORGANIZED HEALTH CARE EDUCATION/TRAINING PROGRAM

## 2025-07-06 PROCEDURE — 99232 SBSQ HOSP IP/OBS MODERATE 35: CPT

## 2025-07-06 RX ADMIN — TIZANIDINE 4 MG: 4 TABLET ORAL at 11:25

## 2025-07-06 RX ADMIN — PANTOPRAZOLE SODIUM 40 MG: 40 TABLET, DELAYED RELEASE ORAL at 06:08

## 2025-07-06 RX ADMIN — PREGABALIN 75 MG: 75 CAPSULE ORAL at 08:01

## 2025-07-06 RX ADMIN — CETIRIZINE HYDROCHLORIDE 5 MG: 10 TABLET, FILM COATED ORAL at 08:01

## 2025-07-06 RX ADMIN — BUDESONIDE 0.5 MG: 0.5 INHALANT RESPIRATORY (INHALATION) at 19:41

## 2025-07-06 RX ADMIN — ISOSORBIDE MONONITRATE 30 MG: 30 TABLET, EXTENDED RELEASE ORAL at 06:07

## 2025-07-06 RX ADMIN — Medication 10 ML: at 20:59

## 2025-07-06 RX ADMIN — LEVOTHYROXINE SODIUM 50 MCG: 0.05 TABLET ORAL at 08:01

## 2025-07-06 RX ADMIN — ARFORMOTEROL TARTRATE 15 MCG: 15 SOLUTION RESPIRATORY (INHALATION) at 08:25

## 2025-07-06 RX ADMIN — HEPARIN SODIUM 5000 UNITS: 5000 INJECTION INTRAVENOUS; SUBCUTANEOUS at 14:00

## 2025-07-06 RX ADMIN — TIZANIDINE 4 MG: 4 TABLET ORAL at 23:18

## 2025-07-06 RX ADMIN — Medication 10 ML: at 08:02

## 2025-07-06 RX ADMIN — HYDROCODONE BITARTRATE AND ACETAMINOPHEN 1 TABLET: 5; 325 TABLET ORAL at 01:59

## 2025-07-06 RX ADMIN — BUPROPION HYDROCHLORIDE 150 MG: 150 TABLET, FILM COATED, EXTENDED RELEASE ORAL at 08:00

## 2025-07-06 RX ADMIN — Medication 1000 UNITS: at 08:01

## 2025-07-06 RX ADMIN — HYDROCODONE BITARTRATE AND ACETAMINOPHEN 1 TABLET: 5; 325 TABLET ORAL at 20:57

## 2025-07-06 RX ADMIN — INSULIN HUMAN 2 UNITS: 100 INJECTION, SOLUTION PARENTERAL at 08:01

## 2025-07-06 RX ADMIN — ALLOPURINOL 100 MG: 100 TABLET ORAL at 08:01

## 2025-07-06 RX ADMIN — HYDROCODONE BITARTRATE AND ACETAMINOPHEN 1 TABLET: 5; 325 TABLET ORAL at 09:34

## 2025-07-06 RX ADMIN — HEPARIN SODIUM 5000 UNITS: 5000 INJECTION INTRAVENOUS; SUBCUTANEOUS at 20:58

## 2025-07-06 RX ADMIN — CYANOCOBALAMIN TAB 1000 MCG 1000 MCG: 1000 TAB at 08:00

## 2025-07-06 RX ADMIN — MONTELUKAST 10 MG: 10 TABLET, FILM COATED ORAL at 20:58

## 2025-07-06 RX ADMIN — Medication 1 TABLET: at 08:00

## 2025-07-06 RX ADMIN — HYDROCODONE BITARTRATE AND ACETAMINOPHEN 1 TABLET: 5; 325 TABLET ORAL at 14:00

## 2025-07-06 RX ADMIN — ARFORMOTEROL TARTRATE 15 MCG: 15 SOLUTION RESPIRATORY (INHALATION) at 19:41

## 2025-07-06 RX ADMIN — FERROUS SULFATE TAB 325 MG (65 MG ELEMENTAL FE) 325 MG: 325 (65 FE) TAB at 08:01

## 2025-07-06 RX ADMIN — PREGABALIN 75 MG: 75 CAPSULE ORAL at 20:58

## 2025-07-06 RX ADMIN — VALSARTAN 160 MG: 160 TABLET, FILM COATED ORAL at 08:01

## 2025-07-06 RX ADMIN — HYDROCODONE BITARTRATE AND ACETAMINOPHEN 1 TABLET: 5; 325 TABLET ORAL at 17:36

## 2025-07-06 RX ADMIN — INSULIN HUMAN 2 UNITS: 100 INJECTION, SOLUTION PARENTERAL at 18:11

## 2025-07-06 RX ADMIN — REVEFENACIN 175 MCG: 175 SOLUTION RESPIRATORY (INHALATION) at 08:25

## 2025-07-06 RX ADMIN — FLUTICASONE PROPIONATE 2 SPRAY: 50 SPRAY, METERED NASAL at 08:01

## 2025-07-06 RX ADMIN — HYDROCODONE BITARTRATE AND ACETAMINOPHEN 1 TABLET: 5; 325 TABLET ORAL at 06:08

## 2025-07-06 RX ADMIN — DULOXETINE 60 MG: 60 CAPSULE, DELAYED RELEASE ORAL at 08:01

## 2025-07-06 RX ADMIN — HEPARIN SODIUM 5000 UNITS: 5000 INJECTION INTRAVENOUS; SUBCUTANEOUS at 06:07

## 2025-07-06 RX ADMIN — BUDESONIDE 0.5 MG: 0.5 INHALANT RESPIRATORY (INHALATION) at 08:25

## 2025-07-06 NOTE — PLAN OF CARE
Goal Outcome Evaluation:  Plan of Care Reviewed With: patient        Progress: improving  Outcome Evaluation: Patient with improving pain control. SHe was able to increase her ambulaton, however still limited by onset of R leg radicular pain. Recommed IPR at discharge    Anticipated Discharge Disposition (PT): inpatient rehabilitation facility

## 2025-07-06 NOTE — PLAN OF CARE
Goal Outcome Evaluation:  Plan of Care Reviewed With: patient        Progress: improving     Pt is A&Ox4. VSS on room air. NSR on tele. Pt has slept off and on throughout the shift. Scheduled norco and PRN zanaflex given as ordered for pain. Up with an assist x1 to BSC; voiding spontaneously. Pt refusing rehab, wants to go home with .

## 2025-07-06 NOTE — PROGRESS NOTES
Chief complaint: Lumbar radiculopathy     Admit Diagnosis:   Spinal stenosis [M48.00]     Subjective: Patient reports significant relief in her RLE radicular pain since starting Lyrica yesterday. She was able to work with therapy yesterday as well and is feeling much stronger.     Objective:    Vitals:    25 1130   BP: 158/93   Pulse: 81   Resp: 16   Temp: 97.1 °F (36.2 °C)   SpO2:      Pulse  Av  Min: 69  Max: 88  Systolic (24hrs), Av , Min:149 , Max:172     Diastolic (24hrs), Av, Min:82, Max:104    Temp (24hrs), Av.8 °F (36.6 °C), Min:97.1 °F (36.2 °C), Max:98.1 °F (36.7 °C)      Patient appears comfortable, resting, sitting up in bed.   Awake, alert and oriented x 3  Speech f/c  Opens eyes spontaneously  EOM intact bilaterally  Pupils 3mm reactive bilaterally  Face symmetric  Tongue midline  Able to move all 4 extremities  Normal sensation to light touch in all 4 extremities  Gait not assessed      Lab Results   Component Value Date     (L) 2025       A/P:   Admit Diagnosis:   Spinal stenosis [M48.00]     I would like for her to continue working with therapy. Continue lyrica and prn pain control. I am going to have her follow up with me as an outpatient and we can assess her progress at that time. I would like to see her back in about 2 weeks after discharge to discuss either continuing conservative treatments versus moving forward with scheduling a lumbar myelogram. All questions answered at the bedside. I will have my  reach out to her after discharge.     Lakshmi Larios PA-C

## 2025-07-06 NOTE — THERAPY TREATMENT NOTE
Patient Name: Raquel Mariee  : 1955    MRN: [id number]                              Today's Date: 2025       Admit Date: 2025    Visit Dx: No diagnosis found.  Patient Active Problem List   Diagnosis    Hypogammaglobulinemia    Gastric bypass status for obesity    CKD (chronic kidney disease), stage III    KALYN (obstructive sleep apnea)    Major depressive disorder in partial remission    Hypothyroidism    Fibromyalgia syndrome    Abdominal aortic aneurysm (AAA) without rupture    Nonrheumatic aortic valve stenosis    Vitamin D deficiency, unspecified     Anatomical narrow angle borderline glaucoma    Abnormal finding of blood chemistry, unspecified     Chronic diastolic heart failure    Prinzmetal angina    Common variable agammaglobulinemia    Polymyositis    Port-A-Cath in place    Abnormal CT of the chest    Acute bronchospasm    Acute kidney injury    Sinusitis, chronic    Anemia    Aortic valve stenosis and insufficiency, rheumatic    Body mass index (BMI) of 40.0 to 44.9 in adult    Calcium kidney stones    Choroidal nevus of left eye    CAD (coronary artery disease)    Claw toe, acquired    Combined forms of age-related cataract of both eyes    Depressive disorder    Essential hypertension with goal blood pressure less than 140/90    Severe persistent asthma with exacerbation    Glaucoma suspect of both eyes    Inappropriate sinus tachycardia    Migraine without aura    Mitral valve regurgitation    Osteoarthrosis involving lower leg    Pain in joint involving lower leg    Posterior vitreous detachment of both eyes    Primary osteoarthritis of left wrist    Anxiety disorder    Status post total knee replacement    Sleep disturbances    Senile nuclear sclerosis    Rotator cuff syndrome of left shoulder    Pure hypercholesterolemia    Thoracic aortic aneurysm without rupture    TIA (transient ischemic attack)    Tracheomalacia    Trochlear nerve palsy, left    Diabetes mellitus without  complication    Urge incontinence of urine    Acute exacerbation of chronic obstructive pulmonary disease (COPD)    Internal hemorrhoid, bleeding    Spinal stenosis     Past Medical History:   Diagnosis Date    Anxiety     Aortic valve stenosis     Cardiac murmur     Cataract, bilateral     CHF (congestive heart failure)     Cholelithiasis GB out    Chronic kidney disease     Stage III    Clostridium difficile infection     in her 30s    Colon polyp Ever since having colonoscopies.    Common variable immunodeficiency     IVIG infusions    Compression fracture of lumbar spine, non-traumatic     COPD (chronic obstructive pulmonary disease)     Coronary artery disease     Prinz metal angina & aortic stenosis    Depression     Diabetes mellitus     type II    Eosinophilic asthma     Fibromyalgia, primary     Full dentures     GERD (gastroesophageal reflux disease)     History of transfusion     in [facility] at [facility].  No reaction noted, patient states she does have an antibody from transfusion.    Hypertension     Hypogammaglobulinemia     Hypothyroidism     Irritable bowel syndrome     Migraines     Morbid obesity     Optic neuritis     Optic neuropathy     Osteoarthritis     Prinzmetal angina 1990    Pseudomonas infection 1998    lungs    PTSD (post-traumatic stress disorder)     Pulmonary edema     Renal insufficiency     Sinus tachycardia 1990    Sleep apnea     no longer uses/needs cpap    Stroke     TIA about 7 years ago    Tachycardia     TIA (transient ischemic attack) 2017    Trochanteric bursitis 01/25/2023     Past Surgical History:   Procedure Laterality Date    APPENDECTOMY      BUNIONECTOMY Bilateral     CARDIAC CATHETERIZATION  2017    no stents needed    CARPAL TUNNEL RELEASE Right     COLONOSCOPY  2021    ENDOMETRIAL ABLATION  1997    EYE SURGERY  ? About 6-8 years ago    Laser for glaucoma    FRACTURE SURGERY  1995    No hardware; Tibeal plateau    GASTRIC BYPASS      HAND SURGERY Left      No hardware    HEMORRHOIDECTOMY N/A 04/09/2024    Procedure: HEMORRHOID BANDING X2;  Surgeon: Melissa Beck MD;  Location: [facility] OR;  Service: General;  Laterality: N/A;    INTERSTIM PLACEMENT N/A 05/03/2023    Procedure: INTERSTIM STAGES 1 AND 2 LEAD AND GENERATOR PLACEMENT;  Surgeon: Abner Nation MD;  Location: [facility] OR;  Service: Urology;  Laterality: N/A;    POSTERIOR VAGINAL REPAIR N/A 08/02/2023    Procedure: POSTERIOR COLPORRHAPHY;  Surgeon: Kellen Galan MD;  Location: [facility] OR;  Service: Gynecology;  Laterality: N/A;    RECTAL EXAMINATION UNDER ANESTHESIA N/A 04/09/2024    Procedure: RECTAL EXAM UNDER ANESTHESIA;  Surgeon: Melissa Beck MD;  Location: [facility] OR;  Service: General;  Laterality: N/A;    REPLACEMENT TOTAL KNEE BILATERAL      TEETH EXTRACTION      all of bottom teeth removed    THORACOTOMY      TONSILLECTOMY      TOTAL ABDOMINAL HYSTERECTOMY WITH SALPINGO OOPHORECTOMY      TRACHEAL SURGERY      Repaired via thoracotomy    TUBAL ABDOMINAL LIGATION  1983    VENOUS ACCESS DEVICE (PORT) INSERTION      port a cath in the left chest wall      General Information       Row Name 07/06/25 [facility]          Physical Therapy Time and Intention    Document Type therapy note (daily note)  -SC     Mode of Treatment physical therapy  -SC       Row Name 07/06/25 1551          General Information    Patient Profile Reviewed yes  -SC     Existing Precautions/Restrictions spinal;fall  -SC       Row Name 07/06/25 1551          Cognition    Orientation Status (Cognition) oriented x 4  -SC       Row Name 07/06/25 [facility]          Safety Issues/Impairments Affecting Functional Mobility    Impairments Affecting Function (Mobility) endurance/activity tolerance;pain;postural/trunk control;strength  -SC     Comment, Safety Issues/Impairments (Mobility) alert, following commands  -SC               User Key  (r) = Recorded By, (t) = Taken By, (c) = Cosigned By      Initials Name Provider Type    SC  Apolinar Butts PT Physical Therapist                   Mobility       Row Name 07/06/25 1552          Bed Mobility    Bed Mobility supine-sit;sit-supine;scooting/bridging  -SC     Scooting/Bridging [place] (Bed Mobility) modified independence  -SC     Supine-Sit [place] (Bed Mobility) modified independence;verbal cues  -SC     Sit-Supine [place] (Bed Mobility) modified independence;verbal cues  -SC     Assistive Device (Bed Mobility) head of bed elevated;lift device  -SC     Comment, (Bed Mobility) able to get in/oob with leg   -SC       Row Name 07/06/25 1552          Transfers    Comment, (Transfers) STS from EOB with cuesf or hand placemnt. Encouraged patient to keep trunk in comfort zone  -SC       Row Name 07/06/25 1552          Sit-Stand Transfer    Sit-Stand [place] (Transfers) standby assist;verbal cues  -SC     Assistive Device (Sit-Stand Transfers) walker, front-wheeled  -SC       Row Name 07/06/25 1552          Gait/Stairs (Locomotion)    [place] Level (Gait) contact guard  -SC     Assistive Device (Gait) walker, front-wheeled  -SC     Distance in Feet (Gait) 24  -SC     Deviations/Abnormal Patterns (Gait) right sided deviations;stride length decreased;weight shifting decreased;gait speed decreased  -SC     Bilateral Gait Deviations forward flexed posture  -SC     Comment, (Gait/Stairs) Worked on controling walker with step through gait pattern.  R leg pain increasing with time requirig patient to sit and rest. [place] preformed sitting trunk flexion during rest perioid  -SC               User Key  (r) = Recorded By, (t) = Taken By, (c) = Cosigned By      Initials Name Provider Type    SC Apolinar Butts PT Physical Therapist                   Obj/Interventions       Row Name 07/06/25 1557          Motor Skills    Therapeutic Exercise hip  -SC       Row Name 07/06/25 1555          Hip (Therapeutic Exercise)    Hip (Therapeutic Exercise) AROM (active range of motion)  -SC      Hip AROM (Therapeutic Exercise) supine;aBduction;aDduction;flexion;extension;other (see comments);20 repititions  2 set 10,  bent knee fallouts, knee to chest  -[facility] Name 07/06/25 5769          Balance    Balance Assessment standing dynamic balance  -SC     Dynamic Standing Balance contact guard;1-person assist  -SC     Position/Device Used, Standing Balance supported;walker, rolling  -SC     Comment, Balance no LOB  -SC               User Key  (r) = Recorded By, (t) = Taken By, (c) = Cosigned By      Initials Name Provider Type    SC Apolinar Butts, PT Physical Therapist                   Goals/Plan    No documentation.                  Clinical Impression       [facility] Name 07/06/25 6629          Pain    Pain Side/Orientation right;lower  -SC     Pain Management Interventions exercise or physical activity utilized  -SC     Response to Pain Interventions activity participation with tolerable pain  -SC     Additional Documentation Pain Scale: FACES Pre/Post-Treatment (Group)  -SC       Row Name 07/06/25 7429          Pain Scale: FACES Pre/Post-Treatment    Pain: FACES Scale, Pretreatment 2-->hurts little bit  -SC     Posttreatment Pain Rating 6-->hurts even more  -[facility] Name 07/06/25 4866          Plan of Care Review    Plan of Care Reviewed With patient  -SC     Progress improving  -SC     Outcome Evaluation Patient with improving pain control. SHe was able to increase her ambulaton, however still limited by onset of R leg radicular pain. Recommed IPR at discharge  -[facility] Name 07/06/25 0656          Therapy Assessment/Plan (PT)    Patient/Family Therapy Goals Statement (PT) walk  -SC     Rehab Potential (PT) good  -SC     Criteria for Skilled Interventions Met (PT) yes;meets criteria  -SC     Therapy Frequency (PT) daily  -[facility] Name 07/06/25 6248          Positioning and Restraints    Pre-Treatment Position in bed  -SC     Post Treatment Position bed  -SC     In Bed supine;call light  within reach;encouraged to call for assist;exit alarm on  -SC               User Key  (r) = Recorded By, (t) = Taken By, (c) = Cosigned By      Initials Name Provider Type    Apolinar Mcrae PT Physical Therapist                   Outcome Measures       Row Name 07/06/25 1600 07/06/25 0800       How much help from another person do you currently need...    Turning from your back to your side while in flat bed without using bedrails? 4  -SC 4  -MD    Moving from lying on back to sitting on the side of a flat bed without bedrails? 3  -SC 3  -MD    Moving to and from a bed to a chair (including a wheelchair)? 3  -SC 3  -MD    Standing up from a chair using your arms (e.g., wheelchair, bedside chair)? 3  -SC 3  -MD    Climbing 3-5 steps with a railing? 2  -SC 3  -MD    To walk in hospital room? 3  -SC 3  -MD    AM-PAC 6 Clicks Score (PT) 18  -SC 19  -MD    Highest Level of Mobility Goal Walk 10 Steps or More-6  -SC Walk 10 Steps or More-6  -MD      Row Name 07/06/25 1600          Functional Assessment    Outcome Measure Options AM-PAC 6 Clicks Basic Mobility (PT)  -SC               User Key  (r) = Recorded By, (t) = Taken By, (c) = Cosigned By      Initials Name Provider Type    SC Apolinar Butts PT Physical Therapist    Racquel Chapman, RN Registered Nurse                                 Physical Therapy Education       Title: PT OT SLP Therapies (In Progress)       Topic: Physical Therapy (Done)       Point: Mobility training (Done)       Learning Progress Summary            Patient SHALOM Queen TB, D, VU, DU by SC at 7/6/2025 1600    Comment: reviewed HEP    SHALOM Queen VU by SC at 7/5/2025 1419    Comment: re idwed HEP                      Point: Home exercise program (Done)       Learning Progress Summary            Patient SHALOM Queen TB, D, VU,EMERSON by SC at 7/6/2025 1600    Comment: reviewed HEP    SHALOM Queen VU by SC at 7/5/2025 1419    Comment: re idwed HEP                      Point: Body mechanics (Done)        Learning Progress Summary            Patient SHALOM Queen,TB,D, VU,DU by SC at 7/6/2025 1600    Comment: reviewed HEP    SHALOM Queen, VU by SC at 7/5/2025 1419    Comment: re idwed HEP                      Point: Precautions (Done)       Learning Progress Summary            Patient SHALOM Queen,TB,D, VU,DU by SC at 7/6/2025 1600    Comment: reviewed HEP    SHALOM Queen, VU by SC at 7/5/2025 1419    Comment: re idwed HEP                                      User Key       Initials Effective Dates Name Provider Type Discipline    SC 02/03/23 -  Apolinar Butts, PT Physical Therapist PT                  PT Recommendation and Plan  Planned Therapy Interventions (PT): balance training, bed mobility training, gait training, home exercise program, ROM (range of motion), patient/family education, strengthening, stretching, transfer training  Progress: improving  Outcome Evaluation: Patient with improving pain control. SHe was able to increase her ambulaton, however still limited by onset of R leg radicular pain. Recommed IPR at discharge     Time Calculation:   PT Evaluation Complexity  History, PT Evaluation Complexity: 3 or more personal factors and/or comorbidities  Examination of Body Systems (PT Eval Complexity): total of 3 or more elements  Clinical Presentation (PT Evaluation Complexity): evolving  Clinical Decision Making (PT Evaluation Complexity): moderate complexity  Overall Complexity (PT Evaluation Complexity): moderate complexity     PT Charges       Row Name 07/06/25 1455             Time Calculation    Start Time 1455  -SC      PT Received On 07/06/25  -SC      PT Goal Re-Cert Due Date 07/15/25  -SC         Timed Charges    73198 - PT Therapeutic Exercise Minutes 10  -SC      79563 - Gait Training Minutes  9  -SC      13680 - PT Therapeutic Activity Minutes 5  -SC         Total Minutes    Timed Charges Total Minutes 24  -SC       Total Minutes 24  -SC                User Key  (r) = Recorded By, (t) = Taken By, (c) =  Cosigned By      Initials Name Provider Type    SC Apolinar Butts, PT Physical Therapist                  Therapy Charges for Today       Code Description Service Date Service Provider Modifiers Qty    66776943052 HC PT THER PROC EA 15 MIN 7/5/2025 Apolinar Butts, PT GP 1    54765786662 HC GAIT TRAINING EA 15 MIN 7/5/2025 Apolinar Butts, PT GP 1    27979118851 HC PT EVAL MOD COMPLEXITY 3 7/5/2025 Apolinar Butts, PT GP 1    18925469959 HC PT THER PROC EA 15 MIN 7/6/2025 Apolinar Butts, PT GP 1    62940862440 HC GAIT TRAINING EA 15 MIN 7/6/2025 Benjamín, Apolinar DELAROSA, PT GP 1            PT G-Codes  Outcome Measure Options: AM-PAC 6 Clicks Basic Mobility (PT)  AM-PAC 6 Clicks Score (PT): 18  AM-PAC 6 Clicks Score (OT): 16  PT Discharge Summary  Anticipated Discharge Disposition (PT): inpatient rehabilitation facility    Apolinar Butts PT  7/6/2025

## 2025-07-06 NOTE — PROGRESS NOTES
[facility] Medicine Services  PROGRESS NOTE    Patient Name: Raquel Mariee  : 1955  MRN: [id number]    Date of Admission: 2025  Primary Care Physician: Sanjeev Rawls MD    Subjective   Subjective     CC:  Radicular pain    HPI:  Can now flex and extend right leg with less discomfort.  Would like to continue working with therapy today, with anticipated discharge home tomorrow.    Denies fevers.  No current dysuria or increased frequency      Objective   Objective     Vital Signs:   Temp:  [97.1 °F (36.2 °C)-98.1 °F (36.7 °C)] 97.1 °F (36.2 °C)  Heart Rate:  [69-88] 81  Resp:  [16-18] 16  BP: (149-172)/() 158/93     Physical Exam:    Non toxic, in bed  Mm moist  RRR  CTAB  Abd soft, NT  Alert, speech clear  Normal affect    Results Reviewed:  LAB RESULTS:      Lab 25  0434 25  1227   WBC 5.49 6.81   HEMOGLOBIN 12.5 12.1   HEMATOCRIT 36.8 35.4   PLATELETS 205 211   NEUTROS ABS  --  5.31   IMMATURE GRANS (ABS)  --  0.04   LYMPHS ABS  --  0.92   MONOS ABS  --  0.51   EOS ABS  --  0.00   MCV 95.8 93.7         Lab 25  0434 25  1227   SODIUM 131* 127*   POTASSIUM 4.4 4.5   CHLORIDE 96* 93*   CO2 23.7 22.8   ANION GAP 11.3 11.2   BUN 10.2 15.0   CREATININE 0.87 0.95   EGFR 72.2 65.0   GLUCOSE 160* 130*   CALCIUM 8.6 8.9   MAGNESIUM 2.0  --    TSH 1.450  --          Lab 25  1227   TOTAL PROTEIN 7.0   ALBUMIN 3.8   GLOBULIN 3.2   ALT (SGPT) 18   AST (SGOT) 30   BILIRUBIN 0.4   ALK PHOS 44                     Brief Urine Lab Results  (Last result in the past 365 days)        Color   Clarity   Blood   Leuk Est   Nitrite   Protein   CREAT   Urine HCG        25 1327 Yellow   Clear   Trace   Small (1+)   Negative   Negative                   Microbiology Results Abnormal       None            MRI Cervical Spine Without Contrast  Result Date: 2025  MRI CERVICAL SPINE WO CONTRAST Date of Exam: 2025 2:12 AM EDT Indication: severe  stenosis.  Comparison: None. Technique:  Routine multiplanar/multisequence sequence images of the cervical spine were obtained without contrast administration.  Findings: Seven cervical vertebra are identified. Alignment is anatomic. Vertebral bodies maintain normal height.  There is no focal bone marrow edema.  No focal lesions identified. Spinal cord signal is normal. Paraspinal musculature is grossly preserved. The craniocervical junction appears within normal limits. C2-C3: Small disc osteophyte complex present with bilateral facet perjury. No evidence of canal stenosis or significant neuroforaminal narrowing. C3-C4: Small disc osteophyte complex present with bilateral facet hypertrophy. There is effacement of the thecal sac anteriorly with no evidence of canal stenosis. Moderate neuroforaminal narrowing is present on the left with no significant neuroforaminal narrowing present on the right. Trace anterolisthesis of C3 on C4 present measuring 2 mm.. C4-C5: Moderate size disc osteophyte complex present with bilateral facet hypertrophy. There is effacement of the thecal sac anteriorly with moderate canal stenosis with the canal measuring up to 8 mm in AP dimension. Moderate neural foraminal narrowing is present on the right with moderate to severe neural foraminal narrowing present on the left. C5-C6: Small to moderate-sized disc osteophyte complex present with bilateral facet hypertrophy. There is effacement of the thecal sac anteriorly with mild canal stenosis with the canal measuring up to 9 mm in AP dimension. Moderate neuroforaminal narrowing is present bilaterally, left greater than right. C6-C7: Moderate size disc osteophyte complex present with bilateral facet hypertrophy. There is effacement of the thecal sac anteriorly with moderate canal stenosis with the canal measuring up to 8 mm in AP dimension. Moderate neuroforaminal narrowing is  present on the left with mild to moderate neural foraminal  narrowing present on the right. C7-T1: Small disc osteophyte complex present with bilateral facet apostrophe. No evidence of canal stenosis or significant neuroforaminal narrowing.     Impression: Impression: No acute osseous abnormality. Multilevel degenerative changes are present throughout the spine with multilevel canal stenosis present, most pronounced at C4-C5 and C6-C7. Varying degrees of neuroforaminal narrowing as described above. Electronically Signed: Malika Irby MD  7/5/2025 3:03 AM EDT  Workstation ID: [id number]    MRI Thoracic Spine Without Contrast  Result Date: 7/5/2025  MRI THORACIC SPINE WO CONTRAST Date of Exam: 7/5/2025 12:56 AM EDT Indication: r incontinence, evaluate for cauda equina.  Comparison: 7/4/2025, 6/18/2025. Technique:  Routine multiplanar/multisequence sequence images of the thoracic spine were obtained without contrast administration. Findings: There are 12 thoracic thoracic type vertebral bodies in normal anatomic alignment. No evidence of fracture or compression deformity. No significant spondylolisthesis. Mild to moderate degenerative changes are present throughout the spine. There is no evidence of canal stenosis. No cord signal abnormality identified. Bilateral facet hypertrophy present at T11-T12 with facet joint effusions with mild effacement of the thecal sac with no evidence of canal stenosis. No significant neuroforaminal narrowing identified at any level. No focal soft tissue abnormalities identified.     Impression: Impression: No acute osseous abnormality. Mild to moderate degenerative changes are present throughout the spine with no evidence of canal stenosis or significant neuroforaminal narrowing at any level. Electronically Signed: Malika Irby MD  7/5/2025 1:40 AM EDT  Workstation ID: [id number]    MRI Lumbar Spine Without Contrast  Result Date: 7/4/2025  MRI LUMBAR SPINE WO CONTRAST Date of Exam: 7/4/2025 10:52 PM EDT Indication: r back pain, incontinence, worsening  pain  Comparison: 7/4/2025, 6/18/2025. Technique:  Routine multiplanar/multisequence sequence images of the lumbar spine were obtained without contrast administration.  Findings: Five lumbar type vertebral bodies are identified.  Alignment is anatomic.  There is no evidence of fracture or compression deformity. Distal spinal cord and conus medullaris appear unremarkable terminating at the L1 level.  Cauda equina appears unremarkable.  Bone marrow signal is within normal limits.  Paraspinal musculature is preserved. L5-S1: No focal disc protrusion or extrusion. Facet joints appear unremarkable. No significant neural foraminal or spinal canal stenosis. L4-L5: There is mild anterolisthesis of L4 and L5 measuring approximately 3 mm related to degenerative change. No pars defect identified. Mild broad-based bulge present with bilateral facet hypertrophy. No evidence of canal stenosis. Mild neural foraminal narrowing present bilaterally, right greater than left. L3-L4: Broad-based bulge present with bilateral facet hypertrophy. There is effacement of the thecal sac anteriorly with no evidence of canal stenosis. Mild neuroforaminal narrowing present bilaterally. L2-L3: Broad-based bulge with superimposed right paracentral disc extrusion present with effacement of the right lateral recess. Facet hypertrophy is present. There is effacement of the thecal sac with no evidence of canal stenosis. At least moderate neuroforaminal narrowing present bilaterally. L1-L2: No focal disc protrusion or extrusion. Facet joints appear unremarkable. No significant neural foraminal or spinal canal stenosis.     Impression: Impression: No acute osseous abnormality. Multilevel degenerative changes are present throughout the spine with multilevel canal effacement with no evidence of canal stenosis, similar as compared to the previous recent study from 6/18/2025. Varying degrees of neuroforaminal narrowing as described above. Electronically  Signed: Malika Irby MD  7/4/2025 11:30 PM EDT  Workstation ID: [id number]    CT Abdomen Pelvis With Contrast  Result Date: 7/4/2025  PROCEDURE: CT ABDOMEN PELVIS W CONTRAST-  HISTORY:  Low back pain, urinary incontinence, fecal incontinence, eval distended bladder or bowel  COMPARISON:  None .  TECHNIQUE: Multiple axial CT images were obtained from the lung bases through the pubic symphysis following the administration of Isovue 300 contrast.  FINDINGS:  ABDOMEN: There is a trace right pleural effusion. There is right pleural thickening. Right middle lobe atelectasis is seen. The heart is normal in size. There are postsurgical changes of gastric bypass. The liver is normal. Gallbladder surgically absent. The spleen is unremarkable. No adrenal mass is present.  The pancreas is normal. There is a 3 mm nonobstructing left renal stone. The aorta is normal in caliber. There is no free fluid or adenopathy.  A moderate amount of stool is seen in the transverse colon.  PELVIS: The appendix is not identified. There is sigmoid diverticulosis. The uterus is surgically absent. The urinary bladder is unremarkable. There is no significant free fluid or adenopathy.      Impression: Right pleural thickening and right middle lobe atelectasis may be inflammatory. Short interval follow-up chest CT is recommended.  Moderate amount of stool in the transverse colon is consistent with constipation.  Left nephrolithiasis without hydronephrosis. .   CTDI: 22.43 mGy DLP: 1130.99 mGy.cm   This study was performed with techniques to keep radiation doses as low as reasonably achievable (ALARA). Individualized dose reduction techniques using automated exposure control or adjustment of mA and/or kV according to the patient size were employed.      Images were reviewed, interpreted, and dictated by Dr. Kwaku Grant MD Transcribed by Leyda Rachel PA-C.  This report was signed and finalized on 7/4/2025 2:01 PM by Kwaku Grant MD.         Results for orders placed in visit on 03/28/25    Adult Transthoracic Echo Complete W/ Cont if Necessary Per Protocol 03/31/2025  2:42 PM    Interpretation Summary  1.  Normal left ventricular size and systolic function, LVEF 50-55%.  2.  Moderate to severe concentric LVH.  3.  Grade 2 diastolic dysfunction.  4.  Normal right ventricular size and systolic function.  5.  Moderately increased left atrial volume index.  6.  Moderate calcification of the aortic valve with moderate aortic stenosis.  7.  Moderate aortic regurgitation.  8.  Mild mitral regurgitation.      Current medications:  Scheduled Meds:allopurinol, 100 mg, Oral, Daily  arformoterol, 15 mcg, Nebulization, BID - RT   And  budesonide, 0.5 mg, Nebulization, BID - RT   And  revefenacin, 175 mcg, Nebulization, Daily - RT  buPROPion SR, 150 mg, Oral, BID  cetirizine, 5 mg, Oral, Daily  cholecalciferol, 1,000 Units, Oral, Daily  DULoxetine, 60 mg, Oral, Daily  ferrous sulfate, 325 mg, Oral, Daily With Breakfast  fluticasone, 2 spray, Each Nare, Daily  heparin (porcine), 5,000 Units, Subcutaneous, Q8H  HYDROcodone-acetaminophen, 1 tablet, Oral, Q4H  insulin regular, 2-7 Units, Subcutaneous, 4x Daily PC & at Bedtime  isosorbide mononitrate, 30 mg, Oral, QAM  levothyroxine, 50 mcg, Oral, Daily  montelukast, 10 mg, Oral, Nightly  multivitamin with minerals, 1 tablet, Oral, Daily  pantoprazole, 40 mg, Oral, Q AM  pregabalin, 75 mg, Oral, BID  sodium chloride, 10 mL, Intravenous, Q12H  valsartan, 160 mg, Oral, Daily  vitamin B-12, 1,000 mcg, Oral, Daily      Continuous Infusions:   PRN Meds:.  acetaminophen **OR** acetaminophen **OR** acetaminophen    albuterol    senna-docusate sodium **AND** polyethylene glycol **AND** bisacodyl **AND** bisacodyl    Calcium Replacement - Follow Nurse / BPA Driven Protocol    dextrose    dextrose    [Held by provider] furosemide    glucagon (human recombinant)    HYDROmorphone **AND** naloxone    Magnesium Standard Dose  Replacement - Follow Nurse / BPA Driven Protocol    Morphine **AND** naloxone    ondansetron ODT    Phosphorus Replacement - Follow Nurse / BPA Driven Protocol    Potassium Replacement - Follow Nurse / BPA Driven Protocol    sodium chloride    sodium chloride    tiZANidine    Assessment & Plan   Assessment & Plan     Active Hospital Problems    Diagnosis  POA    **Spinal stenosis [M48.00]  Yes    Aortic valve stenosis and insufficiency, rheumatic [I06.2]  Yes    CAD (coronary artery disease) [I25.10]  Yes    Chronic diastolic heart failure [I50.32]  Yes    KALYN (obstructive sleep apnea) [G47.33]  Yes    CKD (chronic kidney disease), stage III [N18.30]  Yes    Hypothyroidism [E03.9]  Yes      Resolved Hospital Problems   No resolved problems to display.        Brief Hospital Course to date:  Raquel Mariee is a 69 y.o. female with history of HFpEF, CVID on IVIg monthly, DMII, Neuropathy, anemia, GERD, depression and hypothyroid who presents with back and radicular pain in the right leg.    Lumbar radiculopathy  - patient seen by Neurosurgery  - disc extrusion at L2/3  - continue conservative management, Lyrica added  - PT/OT  - follow up with Neurosurgery in clinic as an outpatient in 2 weeks    Hyponatremia  - sodium 127 at admission, improved to 131   - asymptomatic and appears crhonic  - serum Osm 281  - diuretic held    Asthma  - continue nebs    UTI vs asymptomatic bacteriuria  - culture shows 100K [place], however only 6-10 WBCs and trace bacteria on UA  - afebrile, no leukocytosis, denies dysuria or increased frequency  - multiple drug allergies, will defer antibiotics at present, monitor clinically, discussed with patient.    HTN    HFpEF    DMII  Neuropathy    CVID  - on monthly IVIg through her Immunologist    Iron def anemia    GERD    Depression    Hypothyroid  - levothyroxine  - TSH 1.4    Expected Discharge Location and Transportation: Home  Expected Discharge   Expected discharge date/ time has  not been documented.     VTE Prophylaxis:  Pharmacologic VTE prophylaxis orders are present.         AM-PAC 6 Clicks Score (PT): 19 (07/06/25 0800)    CODE STATUS:   Code Status and Medical Interventions: CPR (Attempt to Resuscitate); Full Support   Ordered at: 07/04/25 0392     Code Status (Patient has no pulse and is not breathing):    CPR (Attempt to Resuscitate)     Medical Interventions (Patient has pulse or is breathing):    Full Support       Yuniel Verduzco MD  07/06/25

## 2025-07-07 PROBLEM — M46.1 BILATERAL SACROILIITIS: Chronic | Status: ACTIVE | Noted: 2025-07-07

## 2025-07-07 LAB
GLUCOSE BLDC GLUCOMTR-MCNC: 121 MG/DL (ref 70–130)
GLUCOSE BLDC GLUCOMTR-MCNC: 129 MG/DL (ref 70–130)
GLUCOSE BLDC GLUCOMTR-MCNC: 132 MG/DL (ref 70–130)
GLUCOSE BLDC GLUCOMTR-MCNC: 93 MG/DL (ref 70–130)

## 2025-07-07 PROCEDURE — 94799 UNLISTED PULMONARY SVC/PX: CPT

## 2025-07-07 PROCEDURE — 97110 THERAPEUTIC EXERCISES: CPT

## 2025-07-07 PROCEDURE — 25010000002 HEPARIN (PORCINE) PER 1000 UNITS: Performed by: STUDENT IN AN ORGANIZED HEALTH CARE EDUCATION/TRAINING PROGRAM

## 2025-07-07 PROCEDURE — 63710000001 REVEFENACIN 175 MCG/3ML SOLUTION: Performed by: STUDENT IN AN ORGANIZED HEALTH CARE EDUCATION/TRAINING PROGRAM

## 2025-07-07 PROCEDURE — 94761 N-INVAS EAR/PLS OXIMETRY MLT: CPT

## 2025-07-07 PROCEDURE — 94664 DEMO&/EVAL PT USE INHALER: CPT

## 2025-07-07 PROCEDURE — 97116 GAIT TRAINING THERAPY: CPT

## 2025-07-07 PROCEDURE — 99232 SBSQ HOSP IP/OBS MODERATE 35: CPT | Performed by: INTERNAL MEDICINE

## 2025-07-07 PROCEDURE — 82948 REAGENT STRIP/BLOOD GLUCOSE: CPT

## 2025-07-07 RX ORDER — PREGABALIN 75 MG/1
75 CAPSULE ORAL EVERY 8 HOURS SCHEDULED
Status: DISCONTINUED | OUTPATIENT
Start: 2025-07-07 | End: 2025-07-10

## 2025-07-07 RX ORDER — INSULIN LISPRO 100 [IU]/ML
2-7 INJECTION, SOLUTION INTRAVENOUS; SUBCUTANEOUS
Status: DISCONTINUED | OUTPATIENT
Start: 2025-07-07 | End: 2025-07-11 | Stop reason: HOSPADM

## 2025-07-07 RX ADMIN — ISOSORBIDE MONONITRATE 30 MG: 30 TABLET, EXTENDED RELEASE ORAL at 05:36

## 2025-07-07 RX ADMIN — FLUTICASONE PROPIONATE 2 SPRAY: 50 SPRAY, METERED NASAL at 09:24

## 2025-07-07 RX ADMIN — HYDROCODONE BITARTRATE AND ACETAMINOPHEN 1 TABLET: 5; 325 TABLET ORAL at 17:54

## 2025-07-07 RX ADMIN — ALLOPURINOL 100 MG: 100 TABLET ORAL at 09:25

## 2025-07-07 RX ADMIN — HYDROCODONE BITARTRATE AND ACETAMINOPHEN 1 TABLET: 5; 325 TABLET ORAL at 05:33

## 2025-07-07 RX ADMIN — DULOXETINE 60 MG: 60 CAPSULE, DELAYED RELEASE ORAL at 09:25

## 2025-07-07 RX ADMIN — PREGABALIN 75 MG: 75 CAPSULE ORAL at 09:25

## 2025-07-07 RX ADMIN — HEPARIN SODIUM 5000 UNITS: 5000 INJECTION INTRAVENOUS; SUBCUTANEOUS at 05:33

## 2025-07-07 RX ADMIN — HEPARIN SODIUM 5000 UNITS: 5000 INJECTION INTRAVENOUS; SUBCUTANEOUS at 21:10

## 2025-07-07 RX ADMIN — Medication 1000 UNITS: at 09:24

## 2025-07-07 RX ADMIN — PANTOPRAZOLE SODIUM 40 MG: 40 TABLET, DELAYED RELEASE ORAL at 05:34

## 2025-07-07 RX ADMIN — TIZANIDINE 4 MG: 4 TABLET ORAL at 21:10

## 2025-07-07 RX ADMIN — CETIRIZINE HYDROCHLORIDE 5 MG: 10 TABLET, FILM COATED ORAL at 09:25

## 2025-07-07 RX ADMIN — HYDROCODONE BITARTRATE AND ACETAMINOPHEN 1 TABLET: 5; 325 TABLET ORAL at 21:10

## 2025-07-07 RX ADMIN — BUDESONIDE 0.5 MG: 0.5 INHALANT RESPIRATORY (INHALATION) at 21:48

## 2025-07-07 RX ADMIN — MONTELUKAST 10 MG: 10 TABLET, FILM COATED ORAL at 21:10

## 2025-07-07 RX ADMIN — PREGABALIN 75 MG: 75 CAPSULE ORAL at 21:10

## 2025-07-07 RX ADMIN — HYDROCODONE BITARTRATE AND ACETAMINOPHEN 1 TABLET: 5; 325 TABLET ORAL at 01:28

## 2025-07-07 RX ADMIN — TIZANIDINE 4 MG: 4 TABLET ORAL at 11:37

## 2025-07-07 RX ADMIN — CYANOCOBALAMIN TAB 1000 MCG 1000 MCG: 1000 TAB at 09:24

## 2025-07-07 RX ADMIN — BUDESONIDE 0.5 MG: 0.5 INHALANT RESPIRATORY (INHALATION) at 10:47

## 2025-07-07 RX ADMIN — PREGABALIN 75 MG: 75 CAPSULE ORAL at 17:55

## 2025-07-07 RX ADMIN — VALSARTAN 160 MG: 160 TABLET, FILM COATED ORAL at 09:25

## 2025-07-07 RX ADMIN — HYDROCODONE BITARTRATE AND ACETAMINOPHEN 1 TABLET: 5; 325 TABLET ORAL at 13:28

## 2025-07-07 RX ADMIN — REVEFENACIN 175 MCG: 175 SOLUTION RESPIRATORY (INHALATION) at 10:53

## 2025-07-07 RX ADMIN — HEPARIN SODIUM 5000 UNITS: 5000 INJECTION INTRAVENOUS; SUBCUTANEOUS at 13:28

## 2025-07-07 RX ADMIN — HYDROCODONE BITARTRATE AND ACETAMINOPHEN 1 TABLET: 5; 325 TABLET ORAL at 09:25

## 2025-07-07 RX ADMIN — Medication 10 ML: at 21:11

## 2025-07-07 RX ADMIN — LEVOTHYROXINE SODIUM 50 MCG: 0.05 TABLET ORAL at 09:25

## 2025-07-07 RX ADMIN — FERROUS SULFATE TAB 325 MG (65 MG ELEMENTAL FE) 325 MG: 325 (65 FE) TAB at 09:25

## 2025-07-07 RX ADMIN — Medication 10 ML: at 09:36

## 2025-07-07 RX ADMIN — ARFORMOTEROL TARTRATE 15 MCG: 15 SOLUTION RESPIRATORY (INHALATION) at 10:42

## 2025-07-07 RX ADMIN — Medication 1 TABLET: at 09:25

## 2025-07-07 NOTE — CASE MANAGEMENT/SOCIAL WORK
Discharge Planning Assessment  [facility]     Patient Name: Raquel Mariee  MRN: [id number]  Today's Date: 7/7/2025    Admit Date: 7/4/2025    Plan: rehab   Discharge Needs Assessment       Row Name 07/07/25 1143       Living Environment    People in Home spouse    Current Living Arrangements home    Potentially Unsafe Housing Conditions none    In the past 12 months has the electric, gas, oil, or water company threatened to shut off services in your home? No    Primary Care Provided by self    Provides Primary Care For no one    Family Caregiver if Needed spouse    Quality of Family Relationships helpful;involved;supportive    Able to Return to Prior Arrangements yes       Resource/Environmental Concerns    Resource/Environmental Concerns none    Transportation Concerns none       Transportation Needs    In the past 12 months, has lack of transportation kept you from medical appointments or from getting medications? no    In the past 12 months, has lack of transportation kept you from meetings, work, or from getting things needed for daily living? No       Food Insecurity    Within the past 12 months, you worried that your food would run out before you got the money to buy more. Never true    Within the past 12 months, the food you bought just didn't last and you didn't have money to get more. Never true       Transition Planning    Patient/Family Anticipates Transition to inpatient rehabilitation facility    Patient/Family Anticipated Services at Transition     Transportation Anticipated family or friend will provide       Discharge Needs Assessment    Readmission Within the Last 30 Days no previous admission in last 30 days    Equipment Currently Used at Home cane, straight    Concerns to be Addressed discharge planning                   Discharge Plan       Row Name 07/07/25 1144       Plan    Plan rehab    Patient/Family in Agreement with Plan yes    Plan Comments Met with patient at the  bedside to initiate discharge planning. Patient lives with her  in a house in [facility]. There are no steps to enter the home. At baseline, patient is independent with ADLs and uses a cane to assist with mobility. Patient denies any home health services or home oxygen use. Patient has [employer] Medicare with prescription benefits and prefers to fill scripts at the [facility] in [place]. Patient is agreeable to therapy's recommendation for inpatient rehab.  will transport. Referrals given to [facility] and [facility]. CM will continue to follow and assist with discharge planning as recommedations become available.    Final Discharge Disposition Code 03 - skilled nursing facility (SNF)                  Continued Care and Services - Admitted Since 7/4/2025    No active coordination exists.       Selected Continued Care - Episodes Includes continued care and service providers with selected services from the active episodes listed below      Asthma - External Fill Episode start date: 3/11/2024   There are no active outsourced providers for this episode.                 Expected Discharge Date and Time       Expected Discharge Date Expected Discharge Time    Jul 9, 2025            Demographic Summary       Row Name 07/07/25 1143       General Information    Admission Type inpatient    Arrived From home    Referral Source physician    Reason for Consult discharge planning    Preferred Language English    General Information Comments PCP Sanjeev Rawls       Contact Information    Permission Granted to Share Info With family/designee    Contact Information Comments PAUL MILAN (Spouse)  [phone] (Mobile)                   Functional Status       Row Name 07/07/25 1143       Functional Status    Usual Activity Tolerance good    Current Activity Tolerance moderate       Functional Status, IADL    Medications independent    Meal Preparation independent    Housekeeping independent    Laundry independent     Shopping independent       Mental Status    General Appearance WDL WDL       Mental Status Summary    Recent Changes in Mental Status/Cognitive Functioning no changes       Employment/    Employment Status retired                   Psychosocial    No documentation.                  Abuse/Neglect    No documentation.                  Legal    No documentation.                  Substance Abuse    No documentation.                  Patient Forms    No documentation.                     Vincent Enriquez, RN

## 2025-07-07 NOTE — PLAN OF CARE
Goal Outcome Evaluation:  Plan of Care Reviewed With: patient        Progress: no change  Outcome Evaluation: Up to recliner and tolerates well. Glucoses 121, 129, and 93. Lyrica increased. L subclavian port is acessed and flushes well. Remains sinus on telemetry. Pt signed a refusal of care form today for bed alarms after education by the nurse and charge nurse. Pain managed with oral medications. Waiting a discharge plan when ready.

## 2025-07-07 NOTE — PROGRESS NOTES
[facility] Medicine Services  PROGRESS NOTE    Patient Name: Raquel Mariee  : 1955  MRN: [id number]    Date of Admission: 2025  Primary Care Physician: Sanjeev Rawls MD    Subjective   Subjective     CC: Follow-up radiculopathy    HPI: No acute events overnight, patient reports having some right lower extremity pain      Objective   Objective     Vital Signs:   Temp:  [97.1 °F (36.2 °C)-98.2 °F (36.8 °C)] 98.2 °F (36.8 °C)  Heart Rate:  [71-84] 71  Resp:  [16-18] 16  BP: (141-171)/(82-93) 141/91     Physical Exam:  Constitutional: No acute distress, awake, alert  HENT: NCAT, mucous membranes moist  Respiratory: Clear to auscultation bilaterally, respiratory effort normal   Cardiovascular: RRR, no murmurs, rubs, or gallops  Gastrointestinal: Positive bowel sounds, soft, nontender, nondistended  Musculoskeletal: No bilateral ankle edema  Psychiatric: Appropriate affect, cooperative  Neurologic: Oriented x 3, nonfocal  Results Reviewed:  LAB RESULTS:      Lab 25  0434 25  1227   WBC 5.49 6.81   HEMOGLOBIN 12.5 12.1   HEMATOCRIT 36.8 35.4   PLATELETS 205 211   NEUTROS ABS  --  5.31   IMMATURE GRANS (ABS)  --  0.04   LYMPHS ABS  --  0.92   MONOS ABS  --  0.51   EOS ABS  --  0.00   MCV 95.8 93.7         Lab 25  0434 25  1227   SODIUM 131* 127*   POTASSIUM 4.4 4.5   CHLORIDE 96* 93*   CO2 23.7 22.8   ANION GAP 11.3 11.2   BUN 10.2 15.0   CREATININE 0.87 0.95   EGFR 72.2 65.0   GLUCOSE 160* 130*   CALCIUM 8.6 8.9   MAGNESIUM 2.0  --    TSH 1.450  --          Lab 25  1227   TOTAL PROTEIN 7.0   ALBUMIN 3.8   GLOBULIN 3.2   ALT (SGPT) 18   AST (SGOT) 30   BILIRUBIN 0.4   ALK PHOS 44                     Brief Urine Lab Results  (Last result in the past 365 days)        Color   Clarity   Blood   Leuk Est   Nitrite   Protein   CREAT   Urine HCG        25 1327 Yellow   Clear   Trace   Small (1+)   Negative   Negative                   Microbiology  Results Abnormal       None            No radiology results from the last 24 hrs    Results for orders placed in visit on 03/28/25    Adult Transthoracic Echo Complete W/ Cont if Necessary Per Protocol 03/31/2025  2:42 PM    Interpretation Summary  1.  Normal left ventricular size and systolic function, LVEF 50-55%.  2.  Moderate to severe concentric LVH.  3.  Grade 2 diastolic dysfunction.  4.  Normal right ventricular size and systolic function.  5.  Moderately increased left atrial volume index.  6.  Moderate calcification of the aortic valve with moderate aortic stenosis.  7.  Moderate aortic regurgitation.  8.  Mild mitral regurgitation.      Current medications:  Scheduled Meds:allopurinol, 100 mg, Oral, Daily  arformoterol, 15 mcg, Nebulization, BID - RT   And  budesonide, 0.5 mg, Nebulization, BID - RT   And  revefenacin, 175 mcg, Nebulization, Daily - RT  buPROPion SR, 150 mg, Oral, BID  cetirizine, 5 mg, Oral, Daily  cholecalciferol, 1,000 Units, Oral, Daily  DULoxetine, 60 mg, Oral, Daily  ferrous sulfate, 325 mg, Oral, Daily With Breakfast  fluticasone, 2 spray, Each Nare, Daily  heparin (porcine), 5,000 Units, Subcutaneous, Q8H  HYDROcodone-acetaminophen, 1 tablet, Oral, Q4H  insulin regular, 2-7 Units, Subcutaneous, 4x Daily PC & at Bedtime  isosorbide mononitrate, 30 mg, Oral, QAM  levothyroxine, 50 mcg, Oral, Daily  montelukast, 10 mg, Oral, Nightly  multivitamin with minerals, 1 tablet, Oral, Daily  pantoprazole, 40 mg, Oral, Q AM  pregabalin, 75 mg, Oral, BID  sodium chloride, 10 mL, Intravenous, Q12H  valsartan, 160 mg, Oral, Daily  vitamin B-12, 1,000 mcg, Oral, Daily      Continuous Infusions:   PRN Meds:.  acetaminophen **OR** acetaminophen **OR** acetaminophen    albuterol    senna-docusate sodium **AND** polyethylene glycol **AND** bisacodyl **AND** bisacodyl    Calcium Replacement - Follow Nurse / BPA Driven Protocol    dextrose    dextrose    [Held by provider] furosemide    glucagon (human  recombinant)    HYDROmorphone **AND** naloxone    Magnesium Standard Dose Replacement - Follow Nurse / BPA Driven Protocol    Morphine **AND** naloxone    ondansetron ODT    Phosphorus Replacement - Follow Nurse / BPA Driven Protocol    Potassium Replacement - Follow Nurse / BPA Driven Protocol    sodium chloride    sodium chloride    tiZANidine    Assessment & Plan   Assessment & Plan     Active Hospital Problems    Diagnosis  POA    **Spinal stenosis [M48.00]  Yes    Aortic valve stenosis and insufficiency, rheumatic [I06.2]  Yes    CAD (coronary artery disease) [I25.10]  Yes    Chronic diastolic heart failure [I50.32]  Yes    KALYN (obstructive sleep apnea) [G47.33]  Yes    CKD (chronic kidney disease), stage III [N18.30]  Yes    Hypothyroidism [E03.9]  Yes      Resolved Hospital Problems   No resolved problems to display.        Brief Hospital Course to date:  Raquel Mariee is a 69 y.o. female with history of HFpEF, CVID on IVIg monthly, DMII, Neuropathy, anemia, GERD, depression and hypothyroid who presents with back and radicular pain in the right leg.     This patient's problems and plans were partially entered by my partner and updated as appropriate by me 07/07/25.     Lumbar radiculopathy  - patient seen by Neurosurgery, patient with disc extrusion at L2/3  - continue conservative management, Lyrica added, will increase to 75 mg every 8 hours  - PT/OT following, recommend rehab  - follow up with Neurosurgery in clinic as an outpatient in 2 weeks     Hyponatremia  - sodium 127 at admission, improved to 131   - asymptomatic and appears chronic  - serum Osm 281  - diuretic held     Asthma  - continue nebs     UTI vs asymptomatic bacteriuria  - culture shows 100K [place], however only 6-10 WBCs and trace bacteria on UA  - afebrile, no leukocytosis, denies dysuria or increased frequency  - multiple drug allergies, will defer antibiotics at present, monitor clinically, discussed with patient.         Chronic HFpEF   Hypertension  -Currently seems compensated   -Continue Imdur, valsartan    Well-controlled type 2 diabetes,   Neuropathy  -FSBG's reviewed and appropriate  -Continue SSI     CVID  - on monthly IVIg through her Immunologist     Iron def anemia  -Continue iron supplement     GERD-continue PPI     Depression  -Continue Wellbutrin, Cymbalta     Hypothyroidism  - levothyroxine  - TSH 1.4    All problems above are new to me today    Expected Discharge Location and Transportation: Inpatient rehab  Expected Discharge   Expected Discharge Date: 7/7/2025; Expected Discharge Time:      VTE Prophylaxis:  Pharmacologic VTE prophylaxis orders are present.       AM-PAC 6 Clicks Score (PT): 18 (07/06/25 2057)    CODE STATUS:   Code Status and Medical Interventions: CPR (Attempt to Resuscitate); Full Support   Ordered at: 07/04/25 2236     Code Status (Patient has no pulse and is not breathing):    CPR (Attempt to Resuscitate)     Medical Interventions (Patient has pulse or is breathing):    Full Support       Adolfo Velazco MD  07/07/25

## 2025-07-07 NOTE — PAYOR COMM NOTE
"Moon Connors RN  [facility]  Utilization Management  P:[phone]  F:[phone]    Ref # [id number]    Raquel Milan (69 y.o. Female)       Date of Birth   1955    Social Security Number       Address   [address] [id number]    Home Phone   [phone]    MRN   [id number]       Spiritism   Moravian    Marital Status                               Admission Date   7/4/2025    Admission Type   Urgent    Admitting Provider   Adolfo Velazco MD    Attending Provider   Adolfo Velazco MD    Department, Room/Bed   [facility] 3G, S361/1       Discharge Date       Discharge Disposition       Discharge Destination                                 Attending Provider: Adolfo Velazco MD    Allergies: Cefaclor, Cephalexin, Cephalosporins, Escitalopram Oxalate, Ambien [Zolpidem Tartrate], Cardizem [Diltiazem Hcl], Metoclopramide, Mobic [Meloxicam], Penicillins, Sumatriptan, Topamax [Topiramate], Verapamil, Zolpidem, Amoxicillin, Edecrin [Ethacrynic Acid], Hydrochlorothiazide, Sulfa Antibiotics    Isolation: None   Infection: None   Code Status: CPR    Ht: 165 cm (64.96\")   Wt: 93 kg (205 lb 0.4 oz)    Admission Cmt: None   Principal Problem: Spinal stenosis [M48.00]                   Active Insurance as of 7/4/2025       Primary Coverage       Payor Plan Insurance Group Employer/Plan Group    AETNA MEDICARE REPLACEMENT AETNA MEDICARE ADVANTAGE HMO [id number]       Payor Plan Address Payor Plan Phone Number Payor Plan Fax Number Effective Dates    [address] [phone]  1/1/2024 - None Entered    [place] [address]         Subscriber Name Subscriber Birth Date Member ID       RAQUEL MILAN 1955 [id number]                     Emergency Contacts        (Rel.) Home Phone Work Phone Mobile Phone    ALLEGRAPAUL NATARAJAN (Spouse) [phone] -- [phone]    JOSEPH AZUL (Daughter) [phone] -- [phone]                 History & " Physical        Aguilar Mcgarry MD at [phone]              [facility] Medicine Services  HISTORY AND PHYSICAL    Patient Name: Raquel Mariee  : 1955  MRN: [id number]  Primary Care Physician: Sanjeev Rawls MD  Date of admission: 2025      Subjective  Subjective     Chief Complaint:  Lower back pain    HPI:  Raquel Mariee is a 69 y.o. female with hx asthma, HFpEF, CVID on IVIG monthly, T2DM here with back pain. She has known severe lumbar stenosis following with NSGY and orthopedics. Reports worsening 3d ago when she bent down to  something. Has radicular type pain radiating down right side making it hard to ambulate. Has had one episode of fecal incontinence and known stress urinary incontinence but states this has worsened over past week. Does not report saddle anesthesia or loss of sensation to legs. She was seen at [facility] ED where she was reported to have post void 160cc and decreased rectal tone, with CT showing diffuse DJD of lumbar spine but no cauda equina. MRI was unable to be performed so was transferred here, case was discussed with neurosurgery prior to transfer.      Personal History     Past Medical History:   Diagnosis Date    Anxiety     Aortic valve stenosis     Cardiac murmur     Cataract, bilateral     CHF (congestive heart failure)     Cholelithiasis GB out    Chronic kidney disease     Stage III    Clostridium difficile infection     in her 30s    Colon polyp Ever since having colonoscopies.    Common variable immunodeficiency     IVIG infusions    Compression fracture of lumbar spine, non-traumatic     COPD (chronic obstructive pulmonary disease)     Coronary artery disease     Prinz metal angina & aortic stenosis    Depression     Diabetes mellitus     type II    Eosinophilic asthma     Fibromyalgia, primary     Full dentures     GERD (gastroesophageal reflux disease)     History of transfusion     in  at [facility].   No reaction noted, patient states she does have an antibody from transfusion.    Hypertension     Hypogammaglobulinemia     Hypothyroidism     Irritable bowel syndrome     Migraines     Morbid obesity     Optic neuritis     Optic neuropathy     Osteoarthritis     Prinzmetal angina 1990    Pseudomonas infection 1998    lungs    PTSD (post-traumatic stress disorder)     Pulmonary edema     Renal insufficiency     Sinus tachycardia 1990    Sleep apnea     no longer uses/needs cpap    Stroke     TIA about 7 years ago    Tachycardia     TIA (transient ischemic attack) 2017    Trochanteric bursitis 01/25/2023           Past Surgical History:   Procedure Laterality Date    APPENDECTOMY      BUNIONECTOMY Bilateral     CARDIAC CATHETERIZATION  2017    no stents needed    CARPAL TUNNEL RELEASE Right     COLONOSCOPY  2021    ENDOMETRIAL ABLATION  1997    EYE SURGERY  ? About 6-8 years ago    Laser for glaucoma    FRACTURE SURGERY  1995    No hardware; Tibeal plateau    GASTRIC BYPASS      HAND SURGERY Left     No hardware    HEMORRHOIDECTOMY N/A 04/09/2024    Procedure: HEMORRHOID BANDING X2;  Surgeon: Melissa Beck MD;  Location: [facility] OR;  Service: General;  Laterality: N/A;    INTERSTIM PLACEMENT N/A 05/03/2023    Procedure: INTERSTIM STAGES 1 AND 2 LEAD AND GENERATOR PLACEMENT;  Surgeon: Abner Nation MD;  Location: [facility] OR;  Service: Urology;  Laterality: N/A;    POSTERIOR VAGINAL REPAIR N/A 08/02/2023    Procedure: POSTERIOR COLPORRHAPHY;  Surgeon: Kellen Galan MD;  Location: [facility] OR;  Service: Gynecology;  Laterality: N/A;    RECTAL EXAMINATION UNDER ANESTHESIA N/A 04/09/2024    Procedure: RECTAL EXAM UNDER ANESTHESIA;  Surgeon: Melissa Beck MD;  Location: [facility] OR;  Service: General;  Laterality: N/A;    REPLACEMENT TOTAL KNEE BILATERAL      TEETH EXTRACTION      all of bottom teeth removed    THORACOTOMY      TONSILLECTOMY      TOTAL ABDOMINAL HYSTERECTOMY WITH SALPINGO OOPHORECTOMY       TRACHEAL SURGERY      Repaired via thoracotomy    TUBAL ABDOMINAL LIGATION  1983    VENOUS ACCESS DEVICE (PORT) INSERTION      port a cath in the left chest wall       Family History: family history includes ADD / ADHD in her son; Alcohol abuse in her son; Anxiety disorder in her daughter; Arthritis in her sister; Asthma in her brother, brother, daughter, daughter, father, sister, sister, sister, and son; Bipolar disorder in her daughter; Brain cancer in her sister; Breast cancer in her maternal cousin; COPD in her brother, brother, father, sister, and sister; Cancer in her sister and sister; Dementia in her father; Depression in her daughter, mother, sister, and sister; Diabetes in her brother, brother, father, mother, sister, and sister; Drug abuse in her son; Endometriosis in her daughter, daughter, mother, sister, sister, and sister; Heart attack in her father, sister, and sister; Heart disease in her father, maternal grandfather, maternal uncle, mother, and sister; Hypertension in her brother, daughter, father, mother, sister, and sister; Irritable bowel syndrome in her daughter; Kidney disease in her sister and sister; Kidney failure in her daughter; Mental illness in her daughter; Osteoarthritis in her daughter and daughter; Self-Injurious Behavior  in her daughter; Stroke in her father, mother, and sister; Suicide Attempts in her daughter.     Social History:  reports that she has never smoked. She has never been exposed to tobacco smoke. She has never used smokeless tobacco. She reports current alcohol use. She reports that she does not use drugs.  Social History     Social History Narrative    Not on file       Medications:  Available home medication information reviewed.  Budeson-Glycopyrrol-Formoterol, DULoxetine, Fluticasone Propionate, Hydrocortisone (Perianal), Magnesium, Mepolizumab, Sinus Rinse Kit, Sitz Bath, acetaminophen-codeine, albuterol sulfate HFA, allopurinol, betamethasone valerate,  buPROPion SR, calcium carbonate, cholecalciferol, denosumab, diphenhydrAMINE, estradiol, ferrous sulfate, fexofenadine, fluticasone-salmeterol, furosemide, glucosamine-chondroitin, guaiFENesin-codeine, immune globulin (human), ipratropium, ipratropium-albuterol, isosorbide mononitrate, levothyroxine, lidocaine, linaclotide, montelukast, multivitamin with minerals, omeprazole, ondansetron, potassium citrate, predniSONE, solifenacin, tiZANidine, tiotropium bromide monohydrate, ubrogepant, valsartan, and vitamin B-12    Allergies   Allergen Reactions    Cefaclor Hives and Swelling     Other reaction(s): SWELLING  Shed 4 layers of skin and extremely SOB  Cesario Bishop's syndrome        Cephalexin Other (See Comments)     Cesario-Bishop's syndrome      Cephalosporins Hives, Swelling and Angioedema     Rechallenge with cefipime caused rash on 1/14/08.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    Verapamil Nausea And Vomiting     Dizzy    Zolpidem Other (See Comments) and Unknown (See Comments)     Automatic behaviour in sleep.  confusion    Amoxicillin Rash    Edecrin [Ethacrynic Acid] Rash and Other (See Comments)     Weight gain    Hydrochlorothiazide Hives    Sulfa Antibiotics Hives       Objective  Objective     Vital Signs:   Temp:  [98 °F (36.7 °C)-98.1 °F (36.7 °C)] 98.1 °F (36.7 °C)  Heart Rate:  [81-93] 81  Resp:  [16-18] 16  BP: (139-185)/(78-98) 185/92       Physical Exam   AAOx3   EOMI PERRL  Neck flexion  intact  Facial expression intact  Heart RRR  Lungs CTAB  Abd soft, nontender  BL upper extremity strength intact, symmetric. Pulses symmetric  BL LE without sensory loss. No clonus. 4/5 strength RLE, 5/5 LLE. Patellar reflex 1+ BL. Pulses intact, symmetric    Result Review:  I have personally reviewed the results from the time of this admission to 7/4/2025 22:43 EDT and agree with these findings:  [x]  Laboratory list / accordion  []  Microbiology  [x]  Radiology  [x]  EKG/Telemetry   []  Cardiology/Vascular   []  Pathology  [x]  Old records  []  Other:  Most notable findings include: see A+P      LAB RESULTS:      Lab 07/04/25  1227   WBC 6.81   HEMOGLOBIN 12.1   HEMATOCRIT 35.4   PLATELETS 211   NEUTROS ABS 5.31   IMMATURE GRANS (ABS) 0.04   LYMPHS ABS 0.92   MONOS ABS 0.51   EOS ABS 0.00   MCV 93.7         Lab 07/04/25  1227   SODIUM 127*   POTASSIUM 4.5   CHLORIDE 93*   CO2 22.8   ANION GAP 11.2   BUN 15.0   CREATININE 0.95   EGFR 65.0   GLUCOSE 130*   CALCIUM 8.9         Lab 07/04/25  1227   TOTAL PROTEIN 7.0   ALBUMIN 3.8   GLOBULIN 3.2   ALT (SGPT) 18   AST (SGOT) 30   BILIRUBIN 0.4   ALK PHOS 44                     UA          7/4/2025    13:27   Urinalysis   Squamous Epithelial Cells, UA 3-6    Specific Gravity, UA 1.015    Ketones, UA Negative    Blood, UA Trace    Leukocytes, UA Small (1+)    Nitrite, UA Negative    RBC, UA 0-2    WBC, UA 6-10    Bacteria, UA Trace        Microbiology Results (last 10 days)       ** No results found for the last 240 hours. **            CT Abdomen Pelvis With Contrast  Result Date: 7/4/2025  PROCEDURE: CT ABDOMEN PELVIS W CONTRAST-  HISTORY:  Low back pain, urinary incontinence, fecal incontinence, eval distended bladder or bowel  COMPARISON:  None .  TECHNIQUE: Multiple axial CT images were obtained from the lung bases through the pubic symphysis following the administration of Isovue 300 contrast.  FINDINGS:  ABDOMEN: There is a trace right pleural effusion. There is  right pleural thickening. Right middle lobe atelectasis is seen. The heart is normal in size. There are postsurgical changes of gastric bypass. The liver is normal. Gallbladder surgically absent. The spleen is unremarkable. No adrenal mass is present.  The pancreas is normal. There is a 3 mm nonobstructing left renal stone. The aorta is normal in caliber. There is no free fluid or adenopathy.  A moderate amount of stool is seen in the transverse colon.  PELVIS: The appendix is not identified. There is sigmoid diverticulosis. The uterus is surgically absent. The urinary bladder is unremarkable. There is no significant free fluid or adenopathy.      Impression: Right pleural thickening and right middle lobe atelectasis may be inflammatory. Short interval follow-up chest CT is recommended.  Moderate amount of stool in the transverse colon is consistent with constipation.  Left nephrolithiasis without hydronephrosis. .   CTDI: 22.43 mGy DLP: 1130.99 mGy.cm   This study was performed with techniques to keep radiation doses as low as reasonably achievable (ALARA). Individualized dose reduction techniques using automated exposure control or adjustment of mA and/or kV according to the patient size were employed.      Images were reviewed, interpreted, and dictated by Dr. Kwaku Grnat MD Transcribed by Leyda Rachel PA-C.  This report was signed and finalized on 7/4/2025 2:01 PM by Kwaku Grant MD.      CT Lumbar Spine Without Contrast  Result Date: 7/4/2025  PROCEDURE: CT LUMBAR SPINE WO CONTRAST-  HISTORY: Low back pain, urinary incontinence, eval fracture  TECHNIQUE: Multiple axial CT images were obtained through the lumbar spine using bone window algorithms. Coronal and sagittal images were reconstructed from the original axial data set.  FINDINGS: There is spaces are moderately diffusely degenerated. There is anterolisthesis of L4 on L5. There is no acute fracture.  There are disc bulges and facet  hypertrophy throughout the lumbar spine. This results in canal stenosis and neuroforaminal narrowing throughout the lower lumbar spine.          Impression: Degenerative disc disease.  No acute fracture.     DLP: 1130.99 mGy.cm CTDI: 22.43 mGy   This study was performed with techniques to keep radiation doses as low as reasonably achievable (ALARA). Individualized dose reduction techniques using automated exposure control or adjustment of mA and/or kV according to the patient size were employed.     Images were reviewed, interpreted, and dictated by Dr. Kwaku Grant MD Transcribed by Leyda Rachel PA-C.  This report was signed and finalized on 7/4/2025 2:01 PM by Kwaku Grant MD.        Results for orders placed in visit on 03/28/25    Adult Transthoracic Echo Complete W/ Cont if Necessary Per Protocol 03/31/2025  2:42 PM    Interpretation Summary  1.  Normal left ventricular size and systolic function, LVEF 50-55%.  2.  Moderate to severe concentric LVH.  3.  Grade 2 diastolic dysfunction.  4.  Normal right ventricular size and systolic function.  5.  Moderately increased left atrial volume index.  6.  Moderate calcification of the aortic valve with moderate aortic stenosis.  7.  Moderate aortic regurgitation.  8.  Mild mitral regurgitation.      Assessment & Plan  Assessment & Plan       Spinal stenosis    CKD (chronic kidney disease), stage III    KALYN (obstructive sleep apnea)    Hypothyroidism    Chronic diastolic heart failure    Aortic valve stenosis and insufficiency, rheumatic    CAD (coronary artery disease)    Lumbar stenosis  -known severe lumbar stenosis with worsening 3d ago, has had one episode of fecal incontinence and known stress urinary incontinence but states this has worsened over past week. Does not report saddle anesthesia or loss of sensation to legs. Exam findings mainly reveal RLE weakness, although limited by radicular type pain. Unable to get MRI so she was transferred here,  NSGY made aware.   -STAT MRIs ordered  -start decadron  -Discussed with on call NSGY, will let them know once images available    Hyponatremia  -acute on chronic, baseline 132, 133. Currently 127. Looks euvolemic on exam. Unclear etiology at this time. Will hold home diuretic and check TSH, urine studies.    Asthma  -stable, continue home inhalers    HTN  -restart home meds    HFpEF  -stable, holding furosemide 2/2 hyponatremia    T2DM w/ neuropathy  -start ssi. Cotninue duloxetine    CVID  -on chronic IVIG infusions    Hypothyroidism  -synthroid, checking TSH    ALMAZ  -iron supplements    GERD  -ppi    Depression  -c/w home meds        VTE Prophylaxis:  Pharmacologic VTE prophylaxis orders are present.          CODE STATUS:    Code Status and Medical Interventions: CPR (Attempt to Resuscitate); Full Support   Ordered at: 07/04/25 2236     Code Status (Patient has no pulse and is not breathing):    CPR (Attempt to Resuscitate)     Medical Interventions (Patient has pulse or is breathing):    Full Support       Expected Discharge   Expected discharge date/ time has not been documented.     Aguilar Mcgarry MD  07/04/25      Electronically signed by Aguilar Mcgarry MD at 07/04/25 2251       Emergency Department Notes    No notes of this type exist for this encounter.       Current Facility-Administered Medications   Medication Dose Route Frequency Provider Last Rate Last Admin    acetaminophen (TYLENOL) tablet 650 mg  650 mg Oral Q4H PRN Aguilar Mcgarry MD        Or    acetaminophen (TYLENOL) 160 MG/5ML oral solution 650 mg  650 mg Oral Q4H PRN Aguilar Mcgarry MD        Or    acetaminophen (TYLENOL) suppository 650 mg  650 mg Rectal Q4H PRN Aguilar Mcgarry MD        albuterol (PROVENTIL) nebulizer solution 0.083% 2.5 mg/3mL  2.5 mg Nebulization Q6H PRN Aguilar Mcgarry MD        allopurinol (ZYLOPRIM) tablet 100 mg  100 mg Oral Daily Aguilar Mcgarry MD   100 mg at 07/07/25 0925    arformoterol (BROVANA)  nebulizer solution 15 mcg  15 mcg Nebulization BID - RT Aguilar Mcgarry MD   15 mcg at 07/07/25 1042    And    budesonide (PULMICORT) nebulizer solution 0.5 mg  0.5 mg Nebulization BID - RT Aguilar Mcgarry MD   0.5 mg at 07/07/25 1047    And    revefenacin (YUPELRI) nebulizer solution 175 mcg  175 mcg Nebulization Daily - RT Aguilar Mcgarry MD   175 mcg at 07/07/25 1053    sennosides-docusate (PERICOLACE) 8.6-50 MG per tablet 2 tablet  2 tablet Oral BID PRN Aguilar Mcgarry MD        And    polyethylene glycol (MIRALAX) packet 17 g  17 g Oral Daily PRN Aguilar Mcgarry MD        And    bisacodyl (DULCOLAX) EC tablet 5 mg  5 mg Oral Daily PRN Aguilar Mcgarry MD        And    bisacodyl (DULCOLAX) suppository 10 mg  10 mg Rectal Daily PRN Aguilar Mcgarry MD        buPROPion SR (WELLBUTRIN SR) 12 hr tablet 150 mg  150 mg Oral BID Aguilar Mcgarry MD   150 mg at 07/06/25 0800    Calcium Replacement - Follow Nurse / BPA Driven Protocol   Not Applicable PRN Aguilar Mcgarry MD        cetirizine (zyrTEC) tablet 5 mg  5 mg Oral Daily Aguilar Mcgarry MD   5 mg at 07/07/25 0925    cholecalciferol (VITAMIN D3) tablet 1,000 Units  1,000 Units Oral Daily Aguilar Mcgarry MD   1,000 Units at 07/07/25 0924    dextrose (D50W) (25 g/50 mL) IV injection 25 g  25 g Intravenous Q15 Min PRN Aguilar Mcgarry MD        dextrose (GLUTOSE) oral gel 15 g  15 g Oral Q15 Min PRN Aguilar Mcgarry MD        DULoxetine (CYMBALTA) DR capsule 60 mg  60 mg Oral Daily Aguilar Mcgarry MD   60 mg at 07/07/25 0925    ferrous sulfate tablet 325 mg  325 mg Oral Daily With Breakfast Aguilar Mcgarry MD   325 mg at 07/07/25 0925    fluticasone (FLONASE) 50 MCG/ACT nasal spray 2 spray  2 spray Each Nare Daily Aguilar Mcgarry MD   2 spray at 07/07/25 0924    [Held by provider] furosemide (LASIX) tablet 40 mg  40 mg Oral Daily PRN Aguilar Mcgarry MD        glucagon (GLUCAGEN) injection 1 mg  1 mg Intramuscular Q15 Min PRN Zeferino,  Aguilar HER MD        heparin (porcine) 5000 UNIT/ML injection 5,000 Units  5,000 Units Subcutaneous Q8H Aguilar Mcgarry MD   5,000 Units at 07/07/25 0533    HYDROcodone-acetaminophen (NORCO) 5-325 MG per tablet 1 tablet  1 tablet Oral Q4H Sahil Gautam MD   1 tablet at 07/07/25 0925    HYDROmorphone (DILAUDID) injection 0.25 mg  0.25 mg Intravenous Q4H PRN Yuniel Verduzco MD        And    naloxone (NARCAN) injection 0.4 mg  0.4 mg Intravenous Q5 Min PRN Yuniel Verduzco MD        insulin regular (humuLIN R,novoLIN R) injection 2-7 Units  2-7 Units Subcutaneous 4x Daily PC & at Bedtime Aguilar Mcgarry MD   2 Units at 07/06/25 1811    isosorbide mononitrate (IMDUR) 24 hr tablet 30 mg  30 mg Oral QAM Aguilar Mcgarry MD   30 mg at 07/07/25 0536    levothyroxine (SYNTHROID, LEVOTHROID) tablet 50 mcg  50 mcg Oral Daily Aguilar Mcgarry MD   50 mcg at 07/07/25 0925    Magnesium Standard Dose Replacement - Follow Nurse / BPA Driven Protocol   Not Applicable PRAguilar Montalvo MD        montelukast (SINGULAIR) tablet 10 mg  10 mg Oral Nightly Aguilar Mcgarry MD   10 mg at 07/06/25 2058    morphine injection 1 mg  1 mg Intravenous Q4H PRN Aguilar Mcgarry MD        And    naloxone (NARCAN) injection 0.4 mg  0.4 mg Intravenous Q5 Min PRN Aguilar Mcgarry MD        multivitamin with minerals 1 tablet  1 tablet Oral Daily Aguilar Mcgarry MD   1 tablet at 07/07/25 0925    ondansetron ODT (ZOFRAN-ODT) disintegrating tablet 4 mg  4 mg Translingual Q6H PRN Lakshmi Larios PA-C   4 mg at 07/05/25 1054    pantoprazole (PROTONIX) EC tablet 40 mg  40 mg Oral Q AM Aguilar Mcgarry MD   40 mg at 07/07/25 0534    Phosphorus Replacement - Follow Nurse / BPA Driven Protocol   Not Applicable PRAguilar Montalvo MD        Potassium Replacement - Follow Nurse / BPA Driven Protocol   Not Applicable PRN Aguilar Mcgarry MD        pregabalin (LYRICA) capsule 75 mg  75 mg Oral Q8H Olga Grajeda PA        sodium  chloride 0.9 % flush 10 mL  10 mL Intravenous Q12H Aguilar Mcgarry MD   10 mL at 07/07/25 0936    sodium chloride 0.9 % flush 10 mL  10 mL Intravenous PRN Aguilar Mcgarry MD        sodium chloride 0.9 % infusion 40 mL  40 mL Intravenous PRN Aguilar Mcgarry MD        tiZANidine (ZANAFLEX) tablet 4 mg  4 mg Oral BID PRN Aguilar Mcgarry MD   4 mg at 07/07/25 1137    valsartan (DIOVAN) tablet 160 mg  160 mg Oral Daily Aguilar Mcgarry MD   160 mg at 07/07/25 0925    vitamin B-12 (CYANOCOBALAMIN) tablet 1,000 mcg  1,000 mcg Oral Daily Aguilar Mcgarry MD   1,000 mcg at 07/07/25 0924     Lab Results (all)       Procedure Component Value Units Date/Time    POC Glucose Once [[id number]]  (Normal) Collected: 07/07/25 0732    Specimen: Blood Updated: 07/07/25 0743     Glucose 121 mg/dL     POC Glucose Once [[id number]]  (Abnormal) Collected: 07/06/25 2043    Specimen: Blood Updated: 07/06/25 2047     Glucose 134 mg/dL     POC Glucose Once [[id number]]  (Abnormal) Collected: 07/06/25 1627    Specimen: Blood Updated: 07/06/25 1628     Glucose 154 mg/dL     POC Glucose Once [[id number]]  (Normal) Collected: 07/06/25 1133    Specimen: Blood Updated: 07/06/25 1135     Glucose 110 mg/dL     POC Glucose Once [[id number]]  (Abnormal) Collected: 07/06/25 0717    Specimen: Blood Updated: 07/06/25 0718     Glucose 170 mg/dL     POC Glucose Once [[id number]]  (Abnormal) Collected: 07/05/25 2035    Specimen: Blood Updated: 07/05/25 2037     Glucose 217 mg/dL     Sodium, Urine, Random - Urine, Clean Catch [[id number]] Collected: 07/05/25 1716    Specimen: Urine, Clean Catch Updated: 07/05/25 2024     Sodium, Urine 38 mmol/L     Narrative:      Reference intervals for random urine have not been established.  Clinical usage is dependent upon physician's interpretation in combination with other laboratory tests.       Osmolality, Urine - Urine, Clean Catch [[id number]]  (Normal) Collected: 07/05/25 8096    Specimen: Urine, Clean Catch  Updated: 07/05/25 1854     Osmolality, Urine 329 mOsm/kg     POC Glucose Once [[id number]]  (Abnormal) Collected: 07/05/25 1630    Specimen: Blood Updated: 07/05/25 1632     Glucose 136 mg/dL     POC Glucose Once [[id number]]  (Abnormal) Collected: 07/05/25 1150    Specimen: Blood Updated: 07/05/25 1151     Glucose 168 mg/dL     Osmolality, Serum [[id number]]  (Normal) Collected: 07/05/25 0434    Specimen: Blood Updated: 07/05/25 0816     Osmolality 281 mOsm/kg     Basic Metabolic Panel [[id number]]  (Abnormal) Collected: 07/05/25 0434    Specimen: Blood Updated: 07/05/25 0544     Glucose 160 mg/dL      BUN 10.2 mg/dL      Creatinine 0.87 mg/dL      Sodium 131 mmol/L      Potassium 4.4 mmol/L      Comment: Specimen hemolyzed.  Result may be falsely elevated.        Chloride 96 mmol/L      CO2 23.7 mmol/L      Calcium 8.6 mg/dL      BUN/Creatinine Ratio 11.7     Anion Gap 11.3 mmol/L      eGFR 72.2 mL/min/1.73     Narrative:      GFR Categories in Chronic Kidney Disease (CKD)              GFR Category          GFR (mL/min/1.73)    Interpretation  G1                    90 or greater        Normal or high (1)  G2                    60-89                Mild decrease (1)  G3a                   45-59                Mild to moderate decrease  G3b                   30-44                Moderate to severe decrease  G4                    15-29                Severe decrease  G5                    14 or less           Kidney failure    (1)In the absence of evidence of kidney disease, neither GFR category G1 or G2 fulfill the criteria for CKD.    eGFR calculation 2021 CKD-EPI creatinine equation, which does not include race as a factor    Magnesium [[id number]]  (Normal) Collected: 07/05/25 0434    Specimen: Blood Updated: 07/05/25 0544     Magnesium 2.0 mg/dL     TSH Rfx On Abnormal To Free T4 [[id number]]  (Normal) Collected: 07/05/25 0434    Specimen: Blood Updated: 07/05/25 0544     TSH 1.450 uIU/mL     CK [[id number]]  (Normal)  Collected: 07/05/25 0434    Specimen: Blood Updated: 07/05/25 0544     Creatine Kinase 135 U/L     POC Glucose Once [[id number]]  (Abnormal) Collected: 07/05/25 0522    Specimen: Blood Updated: 07/05/25 0524     Glucose 177 mg/dL     CBC (No Diff) [[id number]]  (Normal) Collected: 07/05/25 0434    Specimen: Blood Updated: 07/05/25 0517     WBC 5.49 10*3/mm3      RBC 3.84 10*6/mm3      Hemoglobin 12.5 g/dL      Hematocrit 36.8 %      MCV 95.8 fL      MCH 32.6 pg      MCHC 34.0 g/dL      RDW 12.7 %      RDW-SD 44.5 fl      MPV 9.1 fL      Platelets 205 10*3/mm3     POC Glucose Once [[id number]]  (Normal) Collected: 07/04/25 2350    Specimen: Blood Updated: 07/05/25 0001     Glucose 122 mg/dL           Imaging Results (All)       Procedure Component Value Units Date/Time    MRI Cervical Spine Without Contrast [[id number]] Collected: 07/05/25 0259     Updated: 07/05/25 0306    Narrative:        MRI CERVICAL SPINE WO CONTRAST    Date of Exam: 7/5/2025 2:12 AM EDT    Indication: severe stenosis.     Comparison: None.    Technique:  Routine multiplanar/multisequence sequence images of the cervical spine were obtained without contrast administration.        Findings:  Seven cervical vertebra are identified. Alignment is anatomic. Vertebral bodies maintain normal height.  There is no focal bone marrow edema.  No focal lesions identified. Spinal cord signal is normal. Paraspinal musculature is grossly preserved. The   craniocervical junction appears within normal limits.     C2-C3: Small disc osteophyte complex present with bilateral facet perjury. No evidence of canal stenosis or significant neuroforaminal narrowing.    C3-C4: Small disc osteophyte complex present with bilateral facet hypertrophy. There is effacement of the thecal sac anteriorly with no evidence of canal stenosis. Moderate neuroforaminal narrowing is present on the left with no significant   neuroforaminal narrowing present on the right. Trace anterolisthesis of  C3 on C4 present measuring 2 mm..    C4-C5: Moderate size disc osteophyte complex present with bilateral facet hypertrophy. There is effacement of the thecal sac anteriorly with moderate canal stenosis with the canal measuring up to 8 mm in AP dimension. Moderate neural foraminal narrowing   is present on the right with moderate to severe neural foraminal narrowing present on the left.    C5-C6: Small to moderate-sized disc osteophyte complex present with bilateral facet hypertrophy. There is effacement of the thecal sac anteriorly with mild canal stenosis with the canal measuring up to 9 mm in AP dimension. Moderate neuroforaminal   narrowing is present bilaterally, left greater than right.    C6-C7: Moderate size disc osteophyte complex present with bilateral facet hypertrophy. There is effacement of the thecal sac anteriorly with moderate canal stenosis with the canal measuring up to 8 mm in AP dimension. Moderate neuroforaminal narrowing is   present on the left with mild to moderate neural foraminal narrowing present on the right.    C7-T1: Small disc osteophyte complex present with bilateral facet apostrophe. No evidence of canal stenosis or significant neuroforaminal narrowing.      Impression:      Impression:  No acute osseous abnormality. Multilevel degenerative changes are present throughout the spine with multilevel canal stenosis present, most pronounced at C4-C5 and C6-C7. Varying degrees of neuroforaminal narrowing as described above.            Electronically Signed: Malika Irby MD    7/5/2025 3:03 AM EDT    Workstation ID: [id number]    MRI Thoracic Spine Without Contrast [[id number]] Collected: 07/05/25 0137     Updated: 07/05/25 0143    Narrative:        MRI THORACIC SPINE WO CONTRAST    Date of Exam: 7/5/2025 12:56 AM EDT    Indication: r incontinence, evaluate for cauda equina.     Comparison: 7/4/2025, 6/18/2025.    Technique:  Routine multiplanar/multisequence sequence images of the thoracic  spine were obtained without contrast administration.      Findings:  There are 12 thoracic thoracic type vertebral bodies in normal anatomic alignment. No evidence of fracture or compression deformity. No significant spondylolisthesis. Mild to moderate degenerative changes are present throughout the spine. There is no   evidence of canal stenosis. No cord signal abnormality identified. Bilateral facet hypertrophy present at T11-T12 with facet joint effusions with mild effacement of the thecal sac with no evidence of canal stenosis. No significant neuroforaminal   narrowing identified at any level. No focal soft tissue abnormalities identified.      Impression:      Impression:  No acute osseous abnormality. Mild to moderate degenerative changes are present throughout the spine with no evidence of canal stenosis or significant neuroforaminal narrowing at any level.            Electronically Signed: Malika Irby MD    7/5/2025 1:40 AM EDT    Workstation ID: [id number]    MRI Lumbar Spine Without Contrast [[id number]] Collected: 07/04/25 2326     Updated: 07/04/25 2333    Narrative:        MRI LUMBAR SPINE WO CONTRAST    Date of Exam: 7/4/2025 10:52 PM EDT    Indication: r back pain, incontinence, worsening pain     Comparison: 7/4/2025, 6/18/2025.    Technique:  Routine multiplanar/multisequence sequence images of the lumbar spine were obtained without contrast administration.        Findings:  Five lumbar type vertebral bodies are identified.  Alignment is anatomic.  There is no evidence of fracture or compression deformity. Distal spinal cord and conus medullaris appear unremarkable terminating at the L1 level.  Cauda equina appears   unremarkable.  Bone marrow signal is within normal limits.  Paraspinal musculature is preserved.     L5-S1: No focal disc protrusion or extrusion. Facet joints appear unremarkable. No significant neural foraminal or spinal canal stenosis.    L4-L5: There is mild anterolisthesis of L4 and  L5 measuring approximately 3 mm related to degenerative change. No pars defect identified. Mild broad-based bulge present with bilateral facet hypertrophy. No evidence of canal stenosis. Mild neural   foraminal narrowing present bilaterally, right greater than left.    L3-L4: Broad-based bulge present with bilateral facet hypertrophy. There is effacement of the thecal sac anteriorly with no evidence of canal stenosis. Mild neuroforaminal narrowing present bilaterally.    L2-L3: Broad-based bulge with superimposed right paracentral disc extrusion present with effacement of the right lateral recess. Facet hypertrophy is present. There is effacement of the thecal sac with no evidence of canal stenosis. At least moderate   neuroforaminal narrowing present bilaterally.    L1-L2: No focal disc protrusion or extrusion. Facet joints appear unremarkable. No significant neural foraminal or spinal canal stenosis.          Impression:      Impression:  No acute osseous abnormality. Multilevel degenerative changes are present throughout the spine with multilevel canal effacement with no evidence of canal stenosis, similar as compared to the previous recent study from 2025. Varying degrees of   neuroforaminal narrowing as described above.            Electronically Signed: Malika Irby MD    2025 11:30 PM EDT    Workstation ID: [id number]             Physician Progress Notes (all)        Olga Grajeda PA at 25 0950                    [facility]  PROGRESS NOTE    Patient Name: Raquel Mariee  : 1955  MRN: [id number]    Date of Admission: 2025  Length of Stay: 3  Primary Care Physician: Sanjeev Rawls MD    Subjective     CC: Right lower extremity radiculopathy    Admission diagnosis: Spinal stenosis [M48.00]     HPI: Raquel Mariee is a very pleasant 69 y.o. female, retired from nursing, with a 5-month history of lumbosacral stenosis and bilateral  sacroiliitis/piriformis syndrome which has been tolerable and managed with injections from [place] [facility].  She is scheduled for a lumbar epidural steroid on July 15 but experienced such severity and constancy of her right lower extremity shooting pain she reported to ED over the weekend.  She is seen over the weekend by my colleague Lakshmi Singh who has started her on Lyrica 75 mg twice daily, patient notes this has improved symptoms significantly however does not last 12 hours.  She notes that she does not feel able to return home, and would like to go to rehab after discharge.    Review of Systems  ROS is negative except as mentioned in the HPI.    Objective     Vital Signs:   Temp:  [97.1 °F (36.2 °C)-98.2 °F (36.8 °C)] 98.2 °F (36.8 °C)  Heart Rate:  [71-95] 73  Resp:  [16] 16  BP: (141-171)/(86-93) 161/87     Pulse  Av.1  Min: 71  Max: 95  Systolic (24hrs), Avg:160 , Min:141 , Max:171     Diastolic (24hrs), Av, Min:86, Max:93    Temp (24hrs), Av.9 °F (36.6 °C), Min:97.1 °F (36.2 °C), Max:98.2 °F (36.8 °C)      I/O this shift:  In: -   Out: 400 [Urine:400]    Results Reviewed:  I have personally reviewed current lab, radiology, and data and agree.    Results from last 7 days   Lab Units 25  0434 25  1227   WBC 10*3/mm3 5.49 6.81   HEMOGLOBIN g/dL 12.5 12.1   HEMATOCRIT % 36.8 35.4   PLATELETS 10*3/mm3 205 211     Results from last 7 days   Lab Units 25  0434 25  1227   SODIUM mmol/L 131* 127*   POTASSIUM mmol/L 4.4 4.5   CHLORIDE mmol/L 96* 93*   CO2 mmol/L 23.7 22.8   BUN mg/dL 10.2 15.0   CREATININE mg/dL 0.87 0.95   GLUCOSE mg/dL 160* 130*   CALCIUM mg/dL 8.6 8.9   ALK PHOS U/L  --  44   ALT (SGPT) U/L  --  18   AST (SGOT) U/L  --  30     Estimated Creatinine Clearance: 68.7 mL/min (by C-G formula based on SCr of 0.87 mg/dL).    Microbiology Results Abnormal       None            Imaging Results (Last 24 Hours)       ** No results found for the last 24 hours. **           Results for orders placed in visit on 03/28/25    Adult Transthoracic Echo Complete W/ Cont if Necessary Per Protocol 03/31/2025  2:42 PM    Interpretation Summary  1.  Normal left ventricular size and systolic function, LVEF 50-55%.  2.  Moderate to severe concentric LVH.  3.  Grade 2 diastolic dysfunction.  4.  Normal right ventricular size and systolic function.  5.  Moderately increased left atrial volume index.  6.  Moderate calcification of the aortic valve with moderate aortic stenosis.  7.  Moderate aortic regurgitation.  8.  Mild mitral regurgitation.      I have reviewed the medications:  Scheduled Meds:allopurinol, 100 mg, Oral, Daily  arformoterol, 15 mcg, Nebulization, BID - RT   And  budesonide, 0.5 mg, Nebulization, BID - RT   And  revefenacin, 175 mcg, Nebulization, Daily - RT  buPROPion SR, 150 mg, Oral, BID  cetirizine, 5 mg, Oral, Daily  cholecalciferol, 1,000 Units, Oral, Daily  DULoxetine, 60 mg, Oral, Daily  ferrous sulfate, 325 mg, Oral, Daily With Breakfast  fluticasone, 2 spray, Each Nare, Daily  heparin (porcine), 5,000 Units, Subcutaneous, Q8H  HYDROcodone-acetaminophen, 1 tablet, Oral, Q4H  insulin regular, 2-7 Units, Subcutaneous, 4x Daily PC & at Bedtime  isosorbide mononitrate, 30 mg, Oral, QAM  levothyroxine, 50 mcg, Oral, Daily  montelukast, 10 mg, Oral, Nightly  multivitamin with minerals, 1 tablet, Oral, Daily  pantoprazole, 40 mg, Oral, Q AM  pregabalin, 75 mg, Oral, BID  sodium chloride, 10 mL, Intravenous, Q12H  valsartan, 160 mg, Oral, Daily  vitamin B-12, 1,000 mcg, Oral, Daily      PRN Meds:  acetaminophen **OR** acetaminophen **OR** acetaminophen    albuterol    senna-docusate sodium **AND** polyethylene glycol **AND** bisacodyl **AND** bisacodyl    Calcium Replacement - Follow Nurse / BPA Driven Protocol    dextrose    dextrose    [Held by provider] furosemide    glucagon (human recombinant)    HYDROmorphone **AND** naloxone    Magnesium Standard Dose Replacement -  Follow Nurse / BPA Driven Protocol    Morphine **AND** naloxone    ondansetron ODT    Phosphorus Replacement - Follow Nurse / BPA Driven Protocol    Potassium Replacement - Follow Nurse / BPA Driven Protocol    sodium chloride    sodium chloride    tiZANidine    Physical Exam: Today I find patient seated on side of bed with no family at bedside.   she Is alert and oriented, in no acute distress. Able to move all four extremities with no sensory deficits.  She is very tender to palpation over lower lumbar spinous processes as well as bilateral sacroiliac joints and greater trochanters.        Assessment / Plan         Spinal stenosis    CKD (chronic kidney disease), stage III     Bilateral sacroiliitis    I would like patient to continue working with therapy and agree that some time at inpatient rehab would be best.  I will go ahead and increase her Lyrica to 3 times daily which I think will help significantly and allow for improved efforts in therapy.  We will get patient scheduled for a myelogram to evaluate for dynamic listhesis on outpatient basis and have her follow-up with Lakshmi Singh PA-C in a couple of weeks.    Disposition: I expect the patient to be discharged to [facility] in 1-2 days.      Electronically signed by Olga Grajeda PA-C, 25, 09:50 EDT.          Electronically signed by Olga Grajeda PA at 25 1125       Adolfo Velazco MD at 25 0713              [facility] Medicine Services  PROGRESS NOTE    Patient Name: Raquel Mariee  : 1955  MRN: [id number]    Date of Admission: 2025  Primary Care Physician: Sanjeev Rawls MD    Subjective   Subjective     CC: Follow-up radiculopathy    HPI: No acute events overnight, patient reports having some right lower extremity pain      Objective   Objective     Vital Signs:   Temp:  [97.1 °F (36.2 °C)-98.2 °F (36.8 °C)] 98.2 °F (36.8 °C)  Heart Rate:  [71-84] 71  Resp:  [16-18] 16  BP: (141-171)/(82-93)  141/91     Physical Exam:  Constitutional: No acute distress, awake, alert  HENT: NCAT, mucous membranes moist  Respiratory: Clear to auscultation bilaterally, respiratory effort normal   Cardiovascular: RRR, no murmurs, rubs, or gallops  Gastrointestinal: Positive bowel sounds, soft, nontender, nondistended  Musculoskeletal: No bilateral ankle edema  Psychiatric: Appropriate affect, cooperative  Neurologic: Oriented x 3, nonfocal  Results Reviewed:  LAB RESULTS:      Lab 07/05/25  0434 07/04/25  1227   WBC 5.49 6.81   HEMOGLOBIN 12.5 12.1   HEMATOCRIT 36.8 35.4   PLATELETS 205 211   NEUTROS ABS  --  5.31   IMMATURE GRANS (ABS)  --  0.04   LYMPHS ABS  --  0.92   MONOS ABS  --  0.51   EOS ABS  --  0.00   MCV 95.8 93.7         Lab 07/05/25  0434 07/04/25  1227   SODIUM 131* 127*   POTASSIUM 4.4 4.5   CHLORIDE 96* 93*   CO2 23.7 22.8   ANION GAP 11.3 11.2   BUN 10.2 15.0   CREATININE 0.87 0.95   EGFR 72.2 65.0   GLUCOSE 160* 130*   CALCIUM 8.6 8.9   MAGNESIUM 2.0  --    TSH 1.450  --          Lab 07/04/25  1227   TOTAL PROTEIN 7.0   ALBUMIN 3.8   GLOBULIN 3.2   ALT (SGPT) 18   AST (SGOT) 30   BILIRUBIN 0.4   ALK PHOS 44                     Brief Urine Lab Results  (Last result in the past 365 days)        Color   Clarity   Blood   Leuk Est   Nitrite   Protein   CREAT   Urine HCG        07/04/25 1327 Yellow   Clear   Trace   Small (1+)   Negative   Negative                   Microbiology Results Abnormal       None            No radiology results from the last 24 hrs    Results for orders placed in visit on 03/28/25    Adult Transthoracic Echo Complete W/ Cont if Necessary Per Protocol 03/31/2025  2:42 PM    Interpretation Summary  1.  Normal left ventricular size and systolic function, LVEF 50-55%.  2.  Moderate to severe concentric LVH.  3.  Grade 2 diastolic dysfunction.  4.  Normal right ventricular size and systolic function.  5.  Moderately increased left atrial volume index.  6.  Moderate calcification of the  aortic valve with moderate aortic stenosis.  7.  Moderate aortic regurgitation.  8.  Mild mitral regurgitation.      Current medications:  Scheduled Meds:allopurinol, 100 mg, Oral, Daily  arformoterol, 15 mcg, Nebulization, BID - RT   And  budesonide, 0.5 mg, Nebulization, BID - RT   And  revefenacin, 175 mcg, Nebulization, Daily - RT  buPROPion SR, 150 mg, Oral, BID  cetirizine, 5 mg, Oral, Daily  cholecalciferol, 1,000 Units, Oral, Daily  DULoxetine, 60 mg, Oral, Daily  ferrous sulfate, 325 mg, Oral, Daily With Breakfast  fluticasone, 2 spray, Each Nare, Daily  heparin (porcine), 5,000 Units, Subcutaneous, Q8H  HYDROcodone-acetaminophen, 1 tablet, Oral, Q4H  insulin regular, 2-7 Units, Subcutaneous, 4x Daily PC & at Bedtime  isosorbide mononitrate, 30 mg, Oral, QAM  levothyroxine, 50 mcg, Oral, Daily  montelukast, 10 mg, Oral, Nightly  multivitamin with minerals, 1 tablet, Oral, Daily  pantoprazole, 40 mg, Oral, Q AM  pregabalin, 75 mg, Oral, BID  sodium chloride, 10 mL, Intravenous, Q12H  valsartan, 160 mg, Oral, Daily  vitamin B-12, 1,000 mcg, Oral, Daily      Continuous Infusions:   PRN Meds:.  acetaminophen **OR** acetaminophen **OR** acetaminophen    albuterol    senna-docusate sodium **AND** polyethylene glycol **AND** bisacodyl **AND** bisacodyl    Calcium Replacement - Follow Nurse / BPA Driven Protocol    dextrose    dextrose    [Held by provider] furosemide    glucagon (human recombinant)    HYDROmorphone **AND** naloxone    Magnesium Standard Dose Replacement - Follow Nurse / BPA Driven Protocol    Morphine **AND** naloxone    ondansetron ODT    Phosphorus Replacement - Follow Nurse / BPA Driven Protocol    Potassium Replacement - Follow Nurse / BPA Driven Protocol    sodium chloride    sodium chloride    tiZANidine    Assessment & Plan   Assessment & Plan     Active Hospital Problems    Diagnosis  POA    **Spinal stenosis [M48.00]  Yes    Aortic valve stenosis and insufficiency, rheumatic [I06.2]  Yes     CAD (coronary artery disease) [I25.10]  Yes    Chronic diastolic heart failure [I50.32]  Yes    KALYN (obstructive sleep apnea) [G47.33]  Yes    CKD (chronic kidney disease), stage III [N18.30]  Yes    Hypothyroidism [E03.9]  Yes      Resolved Hospital Problems   No resolved problems to display.        Brief Hospital Course to date:  Raquel Mariee is a 69 y.o. female with history of HFpEF, CVID on IVIg monthly, DMII, Neuropathy, anemia, GERD, depression and hypothyroid who presents with back and radicular pain in the right leg.     This patient's problems and plans were partially entered by my partner and updated as appropriate by me 07/07/25.     Lumbar radiculopathy  - patient seen by Neurosurgery, patient with disc extrusion at L2/3  - continue conservative management, Lyrica added, will increase to 75 mg every 8 hours  - PT/OT following, recommend rehab  - follow up with Neurosurgery in clinic as an outpatient in 2 weeks     Hyponatremia  - sodium 127 at admission, improved to 131   - asymptomatic and appears chronic  - serum Osm 281  - diuretic held     Asthma  - continue nebs     UTI vs asymptomatic bacteriuria  - culture shows 100K [place], however only 6-10 WBCs and trace bacteria on UA  - afebrile, no leukocytosis, denies dysuria or increased frequency  - multiple drug allergies, will defer antibiotics at present, monitor clinically, discussed with patient.        Chronic HFpEF   Hypertension  -Currently seems compensated   -Continue Imdur, valsartan    Well-controlled type 2 diabetes,   Neuropathy  -FSBG's reviewed and appropriate  -Continue SSI     CVID  - on monthly IVIg through her Immunologist     Iron def anemia  -Continue iron supplement     GERD-continue PPI     Depression  -Continue Wellbutrin, Cymbalta     Hypothyroidism  - levothyroxine  - TSH 1.4    All problems above are new to me today    Expected Discharge Location and Transportation: Inpatient rehab  Expected Discharge   Expected  Discharge Date: 2025; Expected Discharge Time:      VTE Prophylaxis:  Pharmacologic VTE prophylaxis orders are present.       AM-PAC 6 Clicks Score (PT): 18 (25)    CODE STATUS:   Code Status and Medical Interventions: CPR (Attempt to Resuscitate); Full Support   Ordered at: 25     Code Status (Patient has no pulse and is not breathing):    CPR (Attempt to Resuscitate)     Medical Interventions (Patient has pulse or is breathing):    Full Support       Adolfo Velazco MD  25        Electronically signed by Adolfo Velazco MD at 25 0932       Yuniel Verduzco MD at 25 1315              [facility] Medicine Services  PROGRESS NOTE    Patient Name: Raquel Mariee  : 1955  MRN: [id number]    Date of Admission: 2025  Primary Care Physician: Sanjeev Rawls MD    Subjective   Subjective     CC:  Radicular pain    HPI:  Can now flex and extend right leg with less discomfort.  Would like to continue working with therapy today, with anticipated discharge home tomorrow.    Denies fevers.  No current dysuria or increased frequency      Objective   Objective     Vital Signs:   Temp:  [97.1 °F (36.2 °C)-98.1 °F (36.7 °C)] 97.1 °F (36.2 °C)  Heart Rate:  [69-88] 81  Resp:  [16-18] 16  BP: (149-172)/() 158/93     Physical Exam:    Non toxic, in bed  Mm moist  RRR  CTAB  Abd soft, NT  Alert, speech clear  Normal affect    Results Reviewed:  LAB RESULTS:      Lab 25  0434 25  1227   WBC 5.49 6.81   HEMOGLOBIN 12.5 12.1   HEMATOCRIT 36.8 35.4   PLATELETS 205 211   NEUTROS ABS  --  5.31   IMMATURE GRANS (ABS)  --  0.04   LYMPHS ABS  --  0.92   MONOS ABS  --  0.51   EOS ABS  --  0.00   MCV 95.8 93.7         Lab 25  0434 25  1227   SODIUM 131* 127*   POTASSIUM 4.4 4.5   CHLORIDE 96* 93*   CO2 23.7 22.8   ANION GAP 11.3 11.2   BUN 10.2 15.0   CREATININE 0.87 0.95   EGFR 72.2 65.0   GLUCOSE 160* 130*   CALCIUM 8.6 8.9    MAGNESIUM 2.0  --    TSH 1.450  --          Lab 07/04/25  1227   TOTAL PROTEIN 7.0   ALBUMIN 3.8   GLOBULIN 3.2   ALT (SGPT) 18   AST (SGOT) 30   BILIRUBIN 0.4   ALK PHOS 44                     Brief Urine Lab Results  (Last result in the past 365 days)        Color   Clarity   Blood   Leuk Est   Nitrite   Protein   CREAT   Urine HCG        07/04/25 1327 Yellow   Clear   Trace   Small (1+)   Negative   Negative                   Microbiology Results Abnormal       None            MRI Cervical Spine Without Contrast  Result Date: 7/5/2025  MRI CERVICAL SPINE WO CONTRAST Date of Exam: 7/5/2025 2:12 AM EDT Indication: severe stenosis.  Comparison: None. Technique:  Routine multiplanar/multisequence sequence images of the cervical spine were obtained without contrast administration.  Findings: Seven cervical vertebra are identified. Alignment is anatomic. Vertebral bodies maintain normal height.  There is no focal bone marrow edema.  No focal lesions identified. Spinal cord signal is normal. Paraspinal musculature is grossly preserved. The craniocervical junction appears within normal limits. C2-C3: Small disc osteophyte complex present with bilateral facet perjury. No evidence of canal stenosis or significant neuroforaminal narrowing. C3-C4: Small disc osteophyte complex present with bilateral facet hypertrophy. There is effacement of the thecal sac anteriorly with no evidence of canal stenosis. Moderate neuroforaminal narrowing is present on the left with no significant neuroforaminal narrowing present on the right. Trace anterolisthesis of C3 on C4 present measuring 2 mm.. C4-C5: Moderate size disc osteophyte complex present with bilateral facet hypertrophy. There is effacement of the thecal sac anteriorly with moderate canal stenosis with the canal measuring up to 8 mm in AP dimension. Moderate neural foraminal narrowing is present on the right with moderate to severe neural foraminal narrowing present on the  left. C5-C6: Small to moderate-sized disc osteophyte complex present with bilateral facet hypertrophy. There is effacement of the thecal sac anteriorly with mild canal stenosis with the canal measuring up to 9 mm in AP dimension. Moderate neuroforaminal narrowing is present bilaterally, left greater than right. C6-C7: Moderate size disc osteophyte complex present with bilateral facet hypertrophy. There is effacement of the thecal sac anteriorly with moderate canal stenosis with the canal measuring up to 8 mm in AP dimension. Moderate neuroforaminal narrowing is  present on the left with mild to moderate neural foraminal narrowing present on the right. C7-T1: Small disc osteophyte complex present with bilateral facet apostrophe. No evidence of canal stenosis or significant neuroforaminal narrowing.     Impression: Impression: No acute osseous abnormality. Multilevel degenerative changes are present throughout the spine with multilevel canal stenosis present, most pronounced at C4-C5 and C6-C7. Varying degrees of neuroforaminal narrowing as described above. Electronically Signed: Malika Irby MD  7/5/2025 3:03 AM EDT  Workstation ID: [id number]    MRI Thoracic Spine Without Contrast  Result Date: 7/5/2025  MRI THORACIC SPINE WO CONTRAST Date of Exam: 7/5/2025 12:56 AM EDT Indication: r incontinence, evaluate for cauda equina.  Comparison: 7/4/2025, 6/18/2025. Technique:  Routine multiplanar/multisequence sequence images of the thoracic spine were obtained without contrast administration. Findings: There are 12 thoracic thoracic type vertebral bodies in normal anatomic alignment. No evidence of fracture or compression deformity. No significant spondylolisthesis. Mild to moderate degenerative changes are present throughout the spine. There is no evidence of canal stenosis. No cord signal abnormality identified. Bilateral facet hypertrophy present at T11-T12 with facet joint effusions with mild effacement of the thecal sac  with no evidence of canal stenosis. No significant neuroforaminal narrowing identified at any level. No focal soft tissue abnormalities identified.     Impression: Impression: No acute osseous abnormality. Mild to moderate degenerative changes are present throughout the spine with no evidence of canal stenosis or significant neuroforaminal narrowing at any level. Electronically Signed: Malika Irby MD  7/5/2025 1:40 AM EDT  Workstation ID: [id number]    MRI Lumbar Spine Without Contrast  Result Date: 7/4/2025  MRI LUMBAR SPINE WO CONTRAST Date of Exam: 7/4/2025 10:52 PM EDT Indication: r back pain, incontinence, worsening pain  Comparison: 7/4/2025, 6/18/2025. Technique:  Routine multiplanar/multisequence sequence images of the lumbar spine were obtained without contrast administration.  Findings: Five lumbar type vertebral bodies are identified.  Alignment is anatomic.  There is no evidence of fracture or compression deformity. Distal spinal cord and conus medullaris appear unremarkable terminating at the L1 level.  Cauda equina appears unremarkable.  Bone marrow signal is within normal limits.  Paraspinal musculature is preserved. L5-S1: No focal disc protrusion or extrusion. Facet joints appear unremarkable. No significant neural foraminal or spinal canal stenosis. L4-L5: There is mild anterolisthesis of L4 and L5 measuring approximately 3 mm related to degenerative change. No pars defect identified. Mild broad-based bulge present with bilateral facet hypertrophy. No evidence of canal stenosis. Mild neural foraminal narrowing present bilaterally, right greater than left. L3-L4: Broad-based bulge present with bilateral facet hypertrophy. There is effacement of the thecal sac anteriorly with no evidence of canal stenosis. Mild neuroforaminal narrowing present bilaterally. L2-L3: Broad-based bulge with superimposed right paracentral disc extrusion present with effacement of the right lateral recess. Facet hypertrophy  is present. There is effacement of the thecal sac with no evidence of canal stenosis. At least moderate neuroforaminal narrowing present bilaterally. L1-L2: No focal disc protrusion or extrusion. Facet joints appear unremarkable. No significant neural foraminal or spinal canal stenosis.     Impression: Impression: No acute osseous abnormality. Multilevel degenerative changes are present throughout the spine with multilevel canal effacement with no evidence of canal stenosis, similar as compared to the previous recent study from 6/18/2025. Varying degrees of neuroforaminal narrowing as described above. Electronically Signed: Malika Irby MD  7/4/2025 11:30 PM EDT  Workstation ID: [id number]    CT Abdomen Pelvis With Contrast  Result Date: 7/4/2025  PROCEDURE: CT ABDOMEN PELVIS W CONTRAST-  HISTORY:  Low back pain, urinary incontinence, fecal incontinence, eval distended bladder or bowel  COMPARISON:  None .  TECHNIQUE: Multiple axial CT images were obtained from the lung bases through the pubic symphysis following the administration of Isovue 300 contrast.  FINDINGS:  ABDOMEN: There is a trace right pleural effusion. There is right pleural thickening. Right middle lobe atelectasis is seen. The heart is normal in size. There are postsurgical changes of gastric bypass. The liver is normal. Gallbladder surgically absent. The spleen is unremarkable. No adrenal mass is present.  The pancreas is normal. There is a 3 mm nonobstructing left renal stone. The aorta is normal in caliber. There is no free fluid or adenopathy.  A moderate amount of stool is seen in the transverse colon.  PELVIS: The appendix is not identified. There is sigmoid diverticulosis. The uterus is surgically absent. The urinary bladder is unremarkable. There is no significant free fluid or adenopathy.      Impression: Right pleural thickening and right middle lobe atelectasis may be inflammatory. Short interval follow-up chest CT is recommended.  Moderate  amount of stool in the transverse colon is consistent with constipation.  Left nephrolithiasis without hydronephrosis. .   CTDI: 22.43 mGy DLP: 1130.99 mGy.cm   This study was performed with techniques to keep radiation doses as low as reasonably achievable (ALARA). Individualized dose reduction techniques using automated exposure control or adjustment of mA and/or kV according to the patient size were employed.      Images were reviewed, interpreted, and dictated by Dr. Kwaku Grant MD Transcribed by Leyda Rachel PA-C.  This report was signed and finalized on 7/4/2025 2:01 PM by Kwaku Grant MD.        Results for orders placed in visit on 03/28/25    Adult Transthoracic Echo Complete W/ Cont if Necessary Per Protocol 03/31/2025  2:42 PM    Interpretation Summary  1.  Normal left ventricular size and systolic function, LVEF 50-55%.  2.  Moderate to severe concentric LVH.  3.  Grade 2 diastolic dysfunction.  4.  Normal right ventricular size and systolic function.  5.  Moderately increased left atrial volume index.  6.  Moderate calcification of the aortic valve with moderate aortic stenosis.  7.  Moderate aortic regurgitation.  8.  Mild mitral regurgitation.      Current medications:  Scheduled Meds:allopurinol, 100 mg, Oral, Daily  arformoterol, 15 mcg, Nebulization, BID - RT   And  budesonide, 0.5 mg, Nebulization, BID - RT   And  revefenacin, 175 mcg, Nebulization, Daily - RT  buPROPion SR, 150 mg, Oral, BID  cetirizine, 5 mg, Oral, Daily  cholecalciferol, 1,000 Units, Oral, Daily  DULoxetine, 60 mg, Oral, Daily  ferrous sulfate, 325 mg, Oral, Daily With Breakfast  fluticasone, 2 spray, Each Nare, Daily  heparin (porcine), 5,000 Units, Subcutaneous, Q8H  HYDROcodone-acetaminophen, 1 tablet, Oral, Q4H  insulin regular, 2-7 Units, Subcutaneous, 4x Daily PC & at Bedtime  isosorbide mononitrate, 30 mg, Oral, QAM  levothyroxine, 50 mcg, Oral, Daily  montelukast, 10 mg, Oral, Nightly  multivitamin with  minerals, 1 tablet, Oral, Daily  pantoprazole, 40 mg, Oral, Q AM  pregabalin, 75 mg, Oral, BID  sodium chloride, 10 mL, Intravenous, Q12H  valsartan, 160 mg, Oral, Daily  vitamin B-12, 1,000 mcg, Oral, Daily      Continuous Infusions:   PRN Meds:.  acetaminophen **OR** acetaminophen **OR** acetaminophen    albuterol    senna-docusate sodium **AND** polyethylene glycol **AND** bisacodyl **AND** bisacodyl    Calcium Replacement - Follow Nurse / BPA Driven Protocol    dextrose    dextrose    [Held by provider] furosemide    glucagon (human recombinant)    HYDROmorphone **AND** naloxone    Magnesium Standard Dose Replacement - Follow Nurse / BPA Driven Protocol    Morphine **AND** naloxone    ondansetron ODT    Phosphorus Replacement - Follow Nurse / BPA Driven Protocol    Potassium Replacement - Follow Nurse / BPA Driven Protocol    sodium chloride    sodium chloride    tiZANidine    Assessment & Plan   Assessment & Plan     Active Hospital Problems    Diagnosis  POA    **Spinal stenosis [M48.00]  Yes    Aortic valve stenosis and insufficiency, rheumatic [I06.2]  Yes    CAD (coronary artery disease) [I25.10]  Yes    Chronic diastolic heart failure [I50.32]  Yes    KALYN (obstructive sleep apnea) [G47.33]  Yes    CKD (chronic kidney disease), stage III [N18.30]  Yes    Hypothyroidism [E03.9]  Yes      Resolved Hospital Problems   No resolved problems to display.        Brief Hospital Course to date:  Raquel Mariee is a 69 y.o. female with history of HFpEF, CVID on IVIg monthly, DMII, Neuropathy, anemia, GERD, depression and hypothyroid who presents with back and radicular pain in the right leg.    Lumbar radiculopathy  - patient seen by Neurosurgery  - disc extrusion at L2/3  - continue conservative management, Lyrica added  - PT/OT  - follow up with Neurosurgery in clinic as an outpatient in 2 weeks    Hyponatremia  - sodium 127 at admission, improved to 131   - asymptomatic and appears crhonic  - serum Osm 281  -  diuretic held    Asthma  - continue nebs    UTI vs asymptomatic bacteriuria  - culture shows 100K [place], however only 6-10 WBCs and trace bacteria on UA  - afebrile, no leukocytosis, denies dysuria or increased frequency  - multiple drug allergies, will defer antibiotics at present, monitor clinically, discussed with patient.    HTN    HFpEF    DMII  Neuropathy    CVID  - on monthly IVIg through her Immunologist    Iron def anemia    GERD    Depression    Hypothyroid  - levothyroxine  - TSH 1.4    Expected Discharge Location and Transportation: Home  Expected Discharge   Expected discharge date/ time has not been documented.     VTE Prophylaxis:  Pharmacologic VTE prophylaxis orders are present.         AM-PAC 6 Clicks Score (PT): 19 (07/06/25 0800)    CODE STATUS:   Code Status and Medical Interventions: CPR (Attempt to Resuscitate); Full Support   Ordered at: 07/04/25 3800     Code Status (Patient has no pulse and is not breathing):    CPR (Attempt to Resuscitate)     Medical Interventions (Patient has pulse or is breathing):    Full Support       Yuniel Verduzco MD  07/06/25        Electronically signed by Yuniel Verduzco MD at 07/06/25 1327       Lakshmi Larios PA-C at 07/06/25 1133       Attestation signed by Sahil Gautam MD at 07/07/25 0928      I have reviewed this documentation and agree.    L2-3 disc herniation with no definite evidence of acute cauda equina syndrome.  As such, there is no indication for urgent surgical intervention, especially considering the diffuse nature of the degenerative changes noted throughout her lumbar spine.  My recommendation would be for physical therapy with outpatient follow-up.  Prior to surgery, she would need a lumbar myelogram if she fails to respond to conservative treatment.                    Chief complaint: Lumbar radiculopathy     Admit Diagnosis:   Spinal stenosis [M48.00]     Subjective: Patient reports significant relief in her RLE radicular  pain since starting Lyrica yesterday. She was able to work with therapy yesterday as well and is feeling much stronger.     Objective:    Vitals:    25 1130   BP: 158/93   Pulse: 81   Resp: 16   Temp: 97.1 °F (36.2 °C)   SpO2:      Pulse  Av  Min: 69  Max: 88  Systolic (24hrs), Av , Min:149 , Max:172     Diastolic (24hrs), Av, Min:82, Max:104    Temp (24hrs), Av.8 °F (36.6 °C), Min:97.1 °F (36.2 °C), Max:98.1 °F (36.7 °C)      Patient appears comfortable, resting, sitting up in bed.   Awake, alert and oriented x 3  Speech f/c  Opens eyes spontaneously  EOM intact bilaterally  Pupils 3mm reactive bilaterally  Face symmetric  Tongue midline  Able to move all 4 extremities  Normal sensation to light touch in all 4 extremities  Gait not assessed      Lab Results   Component Value Date     (L) 2025       A/P:   Admit Diagnosis:   Spinal stenosis [M48.00]     I would like for her to continue working with therapy. Continue lyrica and prn pain control. I am going to have her follow up with me as an outpatient and we can assess her progress at that time. I would like to see her back in about 2 weeks after discharge to discuss either continuing conservative treatments versus moving forward with scheduling a lumbar myelogram. All questions answered at the bedside. I will have my  reach out to her after discharge.     Lakshmi Larios PA-C      Electronically signed by Sahil Gautam MD at 25 0923       Yuniel Verduzco MD at 25 1303              [facility] Medicine Services  PROGRESS NOTE    Patient Name: Raquel Mariee  : 1955  MRN: [id number]    Date of Admission: 2025  Primary Care Physician: Sanjeev Rawls MD    Subjective   Subjective     CC:  Radicular pain    HPI:  Still having some discomfort in the right thigh.  Denies LE weakness.  Says sensation intact in her legs except for one area near her right great toe  that has some numbness.      Objective   Objective     Vital Signs:   Temp:  [97.5 °F (36.4 °C)-98.1 °F (36.7 °C)] 97.6 °F (36.4 °C)  Heart Rate:  [75-93] 88  Resp:  [16] 16  BP: (149-185)/(78-98) 158/84     Physical Exam:    Non toxic appearing, in bed  MM moist  RRR  CTAB  Abd soft  Awake, speech clear  Normal affect  Dorsi and plantar flexion 5/5 strength bilaterally    Results Reviewed:  LAB RESULTS:      Lab 07/05/25  0434 07/04/25  1227   WBC 5.49 6.81   HEMOGLOBIN 12.5 12.1   HEMATOCRIT 36.8 35.4   PLATELETS 205 211   NEUTROS ABS  --  5.31   IMMATURE GRANS (ABS)  --  0.04   LYMPHS ABS  --  0.92   MONOS ABS  --  0.51   EOS ABS  --  0.00   MCV 95.8 93.7         Lab 07/05/25  0434 07/04/25  1227   SODIUM 131* 127*   POTASSIUM 4.4 4.5   CHLORIDE 96* 93*   CO2 23.7 22.8   ANION GAP 11.3 11.2   BUN 10.2 15.0   CREATININE 0.87 0.95   EGFR 72.2 65.0   GLUCOSE 160* 130*   CALCIUM 8.6 8.9   MAGNESIUM 2.0  --    TSH 1.450  --          Lab 07/04/25  1227   TOTAL PROTEIN 7.0   ALBUMIN 3.8   GLOBULIN 3.2   ALT (SGPT) 18   AST (SGOT) 30   BILIRUBIN 0.4   ALK PHOS 44                     Brief Urine Lab Results  (Last result in the past 365 days)        Color   Clarity   Blood   Leuk Est   Nitrite   Protein   CREAT   Urine HCG        07/04/25 1327 Yellow   Clear   Trace   Small (1+)   Negative   Negative                   Microbiology Results Abnormal       None            MRI Cervical Spine Without Contrast  Result Date: 7/5/2025  MRI CERVICAL SPINE WO CONTRAST Date of Exam: 7/5/2025 2:12 AM EDT Indication: severe stenosis.  Comparison: None. Technique:  Routine multiplanar/multisequence sequence images of the cervical spine were obtained without contrast administration.  Findings: Seven cervical vertebra are identified. Alignment is anatomic. Vertebral bodies maintain normal height.  There is no focal bone marrow edema.  No focal lesions identified. Spinal cord signal is normal. Paraspinal musculature is grossly preserved.  The craniocervical junction appears within normal limits. C2-C3: Small disc osteophyte complex present with bilateral facet perjury. No evidence of canal stenosis or significant neuroforaminal narrowing. C3-C4: Small disc osteophyte complex present with bilateral facet hypertrophy. There is effacement of the thecal sac anteriorly with no evidence of canal stenosis. Moderate neuroforaminal narrowing is present on the left with no significant neuroforaminal narrowing present on the right. Trace anterolisthesis of C3 on C4 present measuring 2 mm.. C4-C5: Moderate size disc osteophyte complex present with bilateral facet hypertrophy. There is effacement of the thecal sac anteriorly with moderate canal stenosis with the canal measuring up to 8 mm in AP dimension. Moderate neural foraminal narrowing is present on the right with moderate to severe neural foraminal narrowing present on the left. C5-C6: Small to moderate-sized disc osteophyte complex present with bilateral facet hypertrophy. There is effacement of the thecal sac anteriorly with mild canal stenosis with the canal measuring up to 9 mm in AP dimension. Moderate neuroforaminal narrowing is present bilaterally, left greater than right. C6-C7: Moderate size disc osteophyte complex present with bilateral facet hypertrophy. There is effacement of the thecal sac anteriorly with moderate canal stenosis with the canal measuring up to 8 mm in AP dimension. Moderate neuroforaminal narrowing is  present on the left with mild to moderate neural foraminal narrowing present on the right. C7-T1: Small disc osteophyte complex present with bilateral facet apostrophe. No evidence of canal stenosis or significant neuroforaminal narrowing.     Impression: Impression: No acute osseous abnormality. Multilevel degenerative changes are present throughout the spine with multilevel canal stenosis present, most pronounced at C4-C5 and C6-C7. Varying degrees of neuroforaminal narrowing as  described above. Electronically Signed: Malika Irby MD  7/5/2025 3:03 AM EDT  Workstation ID: [id number]    MRI Thoracic Spine Without Contrast  Result Date: 7/5/2025  MRI THORACIC SPINE WO CONTRAST Date of Exam: 7/5/2025 12:56 AM EDT Indication: r incontinence, evaluate for cauda equina.  Comparison: 7/4/2025, 6/18/2025. Technique:  Routine multiplanar/multisequence sequence images of the thoracic spine were obtained without contrast administration. Findings: There are 12 thoracic thoracic type vertebral bodies in normal anatomic alignment. No evidence of fracture or compression deformity. No significant spondylolisthesis. Mild to moderate degenerative changes are present throughout the spine. There is no evidence of canal stenosis. No cord signal abnormality identified. Bilateral facet hypertrophy present at T11-T12 with facet joint effusions with mild effacement of the thecal sac with no evidence of canal stenosis. No significant neuroforaminal narrowing identified at any level. No focal soft tissue abnormalities identified.     Impression: Impression: No acute osseous abnormality. Mild to moderate degenerative changes are present throughout the spine with no evidence of canal stenosis or significant neuroforaminal narrowing at any level. Electronically Signed: Malika Irby MD  7/5/2025 1:40 AM EDT  Workstation ID: [id number]    MRI Lumbar Spine Without Contrast  Result Date: 7/4/2025  MRI LUMBAR SPINE WO CONTRAST Date of Exam: 7/4/2025 10:52 PM EDT Indication: r back pain, incontinence, worsening pain  Comparison: 7/4/2025, 6/18/2025. Technique:  Routine multiplanar/multisequence sequence images of the lumbar spine were obtained without contrast administration.  Findings: Five lumbar type vertebral bodies are identified.  Alignment is anatomic.  There is no evidence of fracture or compression deformity. Distal spinal cord and conus medullaris appear unremarkable terminating at the L1 level.  Cauda equina appears  unremarkable.  Bone marrow signal is within normal limits.  Paraspinal musculature is preserved. L5-S1: No focal disc protrusion or extrusion. Facet joints appear unremarkable. No significant neural foraminal or spinal canal stenosis. L4-L5: There is mild anterolisthesis of L4 and L5 measuring approximately 3 mm related to degenerative change. No pars defect identified. Mild broad-based bulge present with bilateral facet hypertrophy. No evidence of canal stenosis. Mild neural foraminal narrowing present bilaterally, right greater than left. L3-L4: Broad-based bulge present with bilateral facet hypertrophy. There is effacement of the thecal sac anteriorly with no evidence of canal stenosis. Mild neuroforaminal narrowing present bilaterally. L2-L3: Broad-based bulge with superimposed right paracentral disc extrusion present with effacement of the right lateral recess. Facet hypertrophy is present. There is effacement of the thecal sac with no evidence of canal stenosis. At least moderate neuroforaminal narrowing present bilaterally. L1-L2: No focal disc protrusion or extrusion. Facet joints appear unremarkable. No significant neural foraminal or spinal canal stenosis.     Impression: Impression: No acute osseous abnormality. Multilevel degenerative changes are present throughout the spine with multilevel canal effacement with no evidence of canal stenosis, similar as compared to the previous recent study from 6/18/2025. Varying degrees of neuroforaminal narrowing as described above. Electronically Signed: Malika Irby MD  7/4/2025 11:30 PM EDT  Workstation ID: [id number]    CT Abdomen Pelvis With Contrast  Result Date: 7/4/2025  PROCEDURE: CT ABDOMEN PELVIS W CONTRAST-  HISTORY:  Low back pain, urinary incontinence, fecal incontinence, eval distended bladder or bowel  COMPARISON:  None .  TECHNIQUE: Multiple axial CT images were obtained from the lung bases through the pubic symphysis following the administration of  Isovue 300 contrast.  FINDINGS:  ABDOMEN: There is a trace right pleural effusion. There is right pleural thickening. Right middle lobe atelectasis is seen. The heart is normal in size. There are postsurgical changes of gastric bypass. The liver is normal. Gallbladder surgically absent. The spleen is unremarkable. No adrenal mass is present.  The pancreas is normal. There is a 3 mm nonobstructing left renal stone. The aorta is normal in caliber. There is no free fluid or adenopathy.  A moderate amount of stool is seen in the transverse colon.  PELVIS: The appendix is not identified. There is sigmoid diverticulosis. The uterus is surgically absent. The urinary bladder is unremarkable. There is no significant free fluid or adenopathy.      Impression: Right pleural thickening and right middle lobe atelectasis may be inflammatory. Short interval follow-up chest CT is recommended.  Moderate amount of stool in the transverse colon is consistent with constipation.  Left nephrolithiasis without hydronephrosis. .   CTDI: 22.43 mGy DLP: 1130.99 mGy.cm   This study was performed with techniques to keep radiation doses as low as reasonably achievable (ALARA). Individualized dose reduction techniques using automated exposure control or adjustment of mA and/or kV according to the patient size were employed.      Images were reviewed, interpreted, and dictated by Dr. Kwaku Grant MD Transcribed by Leyda Rachel PA-C.  This report was signed and finalized on 7/4/2025 2:01 PM by Kwaku Grant MD.      CT Lumbar Spine Without Contrast  Result Date: 7/4/2025  PROCEDURE: CT LUMBAR SPINE WO CONTRAST-  HISTORY: Low back pain, urinary incontinence, eval fracture  TECHNIQUE: Multiple axial CT images were obtained through the lumbar spine using bone window algorithms. Coronal and sagittal images were reconstructed from the original axial data set.  FINDINGS: There is spaces are moderately diffusely degenerated. There is  anterolisthesis of L4 on L5. There is no acute fracture.  There are disc bulges and facet hypertrophy throughout the lumbar spine. This results in canal stenosis and neuroforaminal narrowing throughout the lower lumbar spine.          Impression: Degenerative disc disease.  No acute fracture.     DLP: 1130.99 mGy.cm CTDI: 22.43 mGy   This study was performed with techniques to keep radiation doses as low as reasonably achievable (ALARA). Individualized dose reduction techniques using automated exposure control or adjustment of mA and/or kV according to the patient size were employed.     Images were reviewed, interpreted, and dictated by Dr. Kwaku Grant MD Transcribed by Leyda Rachel PA-C.  This report was signed and finalized on 7/4/2025 2:01 PM by Kwaku Grant MD.        Results for orders placed in visit on 03/28/25    Adult Transthoracic Echo Complete W/ Cont if Necessary Per Protocol 03/31/2025  2:42 PM    Interpretation Summary  1.  Normal left ventricular size and systolic function, LVEF 50-55%.  2.  Moderate to severe concentric LVH.  3.  Grade 2 diastolic dysfunction.  4.  Normal right ventricular size and systolic function.  5.  Moderately increased left atrial volume index.  6.  Moderate calcification of the aortic valve with moderate aortic stenosis.  7.  Moderate aortic regurgitation.  8.  Mild mitral regurgitation.      Current medications:  Scheduled Meds:allopurinol, 100 mg, Oral, Daily  arformoterol, 15 mcg, Nebulization, BID - RT   And  budesonide, 0.5 mg, Nebulization, BID - RT   And  revefenacin, 175 mcg, Nebulization, Daily - RT  buPROPion SR, 150 mg, Oral, BID  cetirizine, 5 mg, Oral, Daily  cholecalciferol, 1,000 Units, Oral, Daily  dexAMETHasone, 4 mg, Intravenous, Q6H  DULoxetine, 60 mg, Oral, Daily  ferrous sulfate, 325 mg, Oral, Daily With Breakfast  fluticasone, 2 spray, Each Nare, Daily  heparin (porcine), 5,000 Units, Subcutaneous, Q8H  HYDROcodone-acetaminophen, 1  tablet, Oral, Q4H  insulin regular, 2-7 Units, Subcutaneous, Q6H  isosorbide mononitrate, 30 mg, Oral, QAM  levothyroxine, 50 mcg, Oral, Daily  montelukast, 10 mg, Oral, Nightly  multivitamin with minerals, 1 tablet, Oral, Daily  pantoprazole, 40 mg, Oral, Q AM  pregabalin, 75 mg, Oral, BID  sodium chloride, 10 mL, Intravenous, Q12H  valsartan, 160 mg, Oral, Daily  vitamin B-12, 1,000 mcg, Oral, Daily      Continuous Infusions:   PRN Meds:.  acetaminophen **OR** acetaminophen **OR** acetaminophen    albuterol    senna-docusate sodium **AND** polyethylene glycol **AND** bisacodyl **AND** bisacodyl    Calcium Replacement - Follow Nurse / BPA Driven Protocol    dextrose    dextrose    [Held by provider] furosemide    glucagon (human recombinant)    HYDROmorphone **AND** naloxone    Magnesium Standard Dose Replacement - Follow Nurse / BPA Driven Protocol    Morphine **AND** naloxone    ondansetron ODT    Phosphorus Replacement - Follow Nurse / BPA Driven Protocol    Potassium Replacement - Follow Nurse / BPA Driven Protocol    sodium chloride    sodium chloride    tiZANidine    Assessment & Plan   Assessment & Plan     Active Hospital Problems    Diagnosis  POA    **Spinal stenosis [M48.00]  Yes    Aortic valve stenosis and insufficiency, rheumatic [I06.2]  Yes    CAD (coronary artery disease) [I25.10]  Yes    Chronic diastolic heart failure [I50.32]  Yes    KALYN (obstructive sleep apnea) [G47.33]  Yes    CKD (chronic kidney disease), stage III [N18.30]  Yes    Hypothyroidism [E03.9]  Yes      Resolved Hospital Problems   No resolved problems to display.        Brief Hospital Course to date:  Raquel Mariee is a 69 y.o. female with history of HFpEF, CVID on IVIg monthly, DMII, Neuropathy, anemia, GERD, depression and hypothyroid who presents with back and radicular pain in the right leg.    Lumbar radiculopathy  - patient seen by Neurosurgery  - disc extrusion at L2/3  - continue conservative management, Lyrica  added  - PT/OT  - follow up with Neurosurgery in clinic as an outpatient    Hyponatremia  - sodium 127 at admission, improved to 131 today  - asymptomatic  - serum Osm 281  - diuretic held    Asthma  - continue nebs    UTI vs asymptomatic bacteriuria  - 100K GNR  - afebrile, no leukocytosis  - multiple drug allergies    HTN    HFpEF    DMII  Neuropathy    CVID  - on monthly IVIg    Iron def anemia    GERD    Depression    Hypothyroid  - levothyroxine  - TSH 1.4    Expected Discharge Location and Transportation:   Expected Discharge   Expected discharge date/ time has not been documented.     VTE Prophylaxis:  Pharmacologic VTE prophylaxis orders are present.         AM-PAC 6 Clicks Score (PT): 20 (07/04/25 2102)    CODE STATUS:   Code Status and Medical Interventions: CPR (Attempt to Resuscitate); Full Support   Ordered at: 07/04/25 2236     Code Status (Patient has no pulse and is not breathing):    CPR (Attempt to Resuscitate)     Medical Interventions (Patient has pulse or is breathing):    Full Support       Yuniel Verduzco MD  07/05/25        Electronically signed by Yuniel Verduzco MD at 07/05/25 1327          Consult Notes (all)        Lakshmi Larios PA-C at 07/05/25 1048        Consult Orders    1. Inpatient Neurosurgery Consult [[id number]] ordered by Aguilar Mcgarry MD at 07/05/25 0236              Attestation signed by Sahil Gautam MD at 07/06/25 0856      I have reviewed this documentation and agree.                      Reason for consultation: Lumbar radiculopathy    Referring Physician:  Vahe Butler PA-C     Chief complaint low back pain with radiation into the right lower extremity    Admit Diagnosis:   Spinal stenosis [M48.00]     History of present illness:  This is a 69-year-old female who presented to the [facility] ED yesterday evening with a several day history of worsening low back pain with radiation into the right lower extremity to the point where it was  difficult for her to ambulate.  She was also reporting some episodes of fecal incontinence and she does have a history of known stress urinary incontinence but feels that it had gotten worse over the past week.  Patient denied any symptoms of numbness/tingling, or saddle anesthesia.  She does feel weak in her right lower extremity secondary to the pain.  CT scan of the lumbar spine did reveal multilevel degenerative changes throughout.  Neurosurgical consultation was ultimately requested and we recommended MRI of the lumbar spine, however their machine was down.  She was ultimately transferred to our facility to undergo MRI.  During my bedside visit today, she reports right lower extremity pain to the point where it is difficult for her to ambulate.  She denies saddle anesthesia.  She does feel weak in the right lower extremity but reports that this is mostly secondary to her radiating radicular pain.    ROS:  A 12 point review of systems was performed.  All systems reviewed were negative with the exception of those mentioned in the history of present illness.    Past medical history:  Past Medical History:   Diagnosis Date    Anxiety     Aortic valve stenosis     Cardiac murmur     Cataract, bilateral     CHF (congestive heart failure)     Cholelithiasis GB out    Chronic kidney disease     Stage III    Clostridium difficile infection     in her 30s    Colon polyp Ever since having colonoscopies.    Common variable immunodeficiency     IVIG infusions    Compression fracture of lumbar spine, non-traumatic     COPD (chronic obstructive pulmonary disease)     Coronary artery disease     Prinz metal angina & aortic stenosis    Depression     Diabetes mellitus     type II    Eosinophilic asthma     Fibromyalgia, primary     Full dentures     GERD (gastroesophageal reflux disease)     History of transfusion     in 1979 at [facility].  No reaction noted, patient states she does have an antibody from  transfusion.    Hypertension     Hypogammaglobulinemia     Hypothyroidism     Irritable bowel syndrome     Migraines     Morbid obesity     Optic neuritis     Optic neuropathy     Osteoarthritis     Prinzmetal angina 1990    Pseudomonas infection 1998    lungs    PTSD (post-traumatic stress disorder)     Pulmonary edema     Renal insufficiency     Sinus tachycardia 1990    Sleep apnea     no longer uses/needs cpap    Stroke     TIA about 7 years ago    Tachycardia     TIA (transient ischemic attack) 2017    Trochanteric bursitis 01/25/2023       Past surgical history:  Past Surgical History:   Procedure Laterality Date    APPENDECTOMY      BUNIONECTOMY Bilateral     CARDIAC CATHETERIZATION  2017    no stents needed    CARPAL TUNNEL RELEASE Right     COLONOSCOPY  2021    ENDOMETRIAL ABLATION  1997    EYE SURGERY  ? About 6-8 years ago    Laser for glaucoma    FRACTURE SURGERY  1995    No hardware; Tibeal plateau    GASTRIC BYPASS      HAND SURGERY Left     No hardware    HEMORRHOIDECTOMY N/A 04/09/2024    Procedure: HEMORRHOID BANDING X2;  Surgeon: Melissa Beck MD;  Location: [facility] OR;  Service: General;  Laterality: N/A;    INTERSTIM PLACEMENT N/A 05/03/2023    Procedure: INTERSTIM STAGES 1 AND 2 LEAD AND GENERATOR PLACEMENT;  Surgeon: Abner Nation MD;  Location: [facility] OR;  Service: Urology;  Laterality: N/A;    POSTERIOR VAGINAL REPAIR N/A 08/02/2023    Procedure: POSTERIOR COLPORRHAPHY;  Surgeon: Kellen Galan MD;  Location:  [facility] OR;  Service: Gynecology;  Laterality: N/A;    RECTAL EXAMINATION UNDER ANESTHESIA N/A 04/09/2024    Procedure: RECTAL EXAM UNDER ANESTHESIA;  Surgeon: Melissa Beck MD;  Location: [facility] OR;  Service: General;  Laterality: N/A;    REPLACEMENT TOTAL KNEE BILATERAL      TEETH EXTRACTION      all of bottom teeth removed    THORACOTOMY      TONSILLECTOMY      TOTAL ABDOMINAL HYSTERECTOMY WITH SALPINGO OOPHORECTOMY      TRACHEAL SURGERY      Repaired via  thoracotomy    TUBAL ABDOMINAL LIGATION  1983    VENOUS ACCESS DEVICE (PORT) INSERTION      port a cath in the left chest wall       Social history:  Social History     Socioeconomic History    Marital status:    Tobacco Use    Smoking status: Never     Passive exposure: Never    Smokeless tobacco: Never   Vaping Use    Vaping status: Never Used   Substance and Sexual Activity    Alcohol use: Yes     Comment: ONCE A YEAR    Drug use: Never    Sexual activity: Defer       Family history:  Family History   Problem Relation Age of Onset    Diabetes Mother     Stroke Mother     Heart disease Mother     Hypertension Mother     Endometriosis Mother     Depression Mother     Diabetes Father     Hypertension Father     Stroke Father     Heart attack Father     Asthma Father     COPD Father     Dementia Father     Heart disease Father     COPD Sister     Asthma Sister     Cancer Sister         Nasal cell skin    Brain cancer Sister     Diabetes Sister     Stroke Sister     Heart attack Sister     Endometriosis Sister     Depression Sister     Arthritis Sister         Rheumatoid    Hypertension Sister     Kidney disease Sister     Diabetes Sister     COPD Sister     Asthma Sister     Endometriosis Sister     Depression Sister     Cancer Sister         Glioma    Heart disease Sister         MI    Heart attack Sister     Endometriosis Sister     Asthma Sister     Hypertension Sister     Kidney disease Sister         ESRD    COPD Brother     Asthma Brother         Turned into COPD    Diabetes Brother     Asthma Brother     COPD Brother     Diabetes Brother     Hypertension Brother     Asthma Daughter     Endometriosis Daughter     Osteoarthritis Daughter     Hypertension Daughter     Kidney failure Daughter     Irritable bowel syndrome Daughter     Anxiety disorder Daughter     Bipolar disorder Daughter     Depression Daughter     Self-Injurious Behavior  Daughter     Suicide Attempts Daughter     Mental illness Daughter          Bipolar and eating fisorder    Asthma Daughter     Endometriosis Daughter     Osteoarthritis Daughter     Asthma Son     ADD / ADHD Son     Alcohol abuse Son     Drug abuse Son     Breast cancer Maternal Cousin     Heart disease Maternal Grandfather     Heart disease Maternal Uncle     OCD Neg Hx     Paranoid behavior Neg Hx     Schizophrenia Neg Hx     Seizures Neg Hx     Ovarian cancer Neg Hx        Allergies:  Allergies   Allergen Reactions    Cefaclor Hives and Swelling     Other reaction(s): SWELLING  Shed 4 layers of skin and extremely SOB  Cesario Bishop's syndrome        Cephalexin Other (See Comments)     Cesario-Bishop's syndrome      Cephalosporins Hives, Swelling and Angioedema     Rechallenge with cefipime caused rash on 1/14/08.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    Verapamil Nausea And Vomiting     Dizzy    Zolpidem Other (See Comments) and Unknown (See Comments)     Automatic behaviour in sleep.  confusion    Amoxicillin Rash    Edecrin [Ethacrynic Acid] Rash and Other (See Comments)     Weight gain    Hydrochlorothiazide Hives    Sulfa Antibiotics Hives       Outpatient medications:  Scheduled Meds:allopurinol, 100 mg, Oral, Daily  arformoterol, 15 mcg, Nebulization, BID - RT   And  budesonide, 0.5 mg, Nebulization, BID - RT   And  revefenacin, 175 mcg, Nebulization, Daily - RT  buPROPion SR, 150 mg, Oral, BID  cetirizine, 5 mg, Oral, Daily  cholecalciferol, 1,000 Units, Oral,  Daily  dexAMETHasone, 4 mg, Intravenous, Q6H  DULoxetine, 60 mg, Oral, Daily  ferrous sulfate, 325 mg, Oral, Daily With Breakfast  fluticasone, 2 spray, Each Nare, Daily  heparin (porcine), 5,000 Units, Subcutaneous, Q8H  insulin regular, 2-7 Units, Subcutaneous, Q6H  isosorbide mononitrate, 30 mg, Oral, QAM  levothyroxine, 50 mcg, Oral, Daily  montelukast, 10 mg, Oral, Nightly  multivitamin with minerals, 1 tablet, Oral, Daily  pantoprazole, 40 mg, Oral, Q AM  pregabalin, 75 mg, Oral, BID  sodium chloride, 10 mL, Intravenous, Q12H  valsartan, 160 mg, Oral, Daily  vitamin B-12, 1,000 mcg, Oral, Daily      Continuous Infusions:   PRN Meds:.  acetaminophen **OR** acetaminophen **OR** acetaminophen    albuterol    senna-docusate sodium **AND** polyethylene glycol **AND** bisacodyl **AND** bisacodyl    Calcium Replacement - Follow Nurse / BPA Driven Protocol    dextrose    dextrose    [Held by provider] furosemide    glucagon (human recombinant)    HYDROmorphone **AND** naloxone    Magnesium Standard Dose Replacement - Follow Nurse / BPA Driven Protocol    Morphine **AND** naloxone    ondansetron ODT    Phosphorus Replacement - Follow Nurse / BPA Driven Protocol    Potassium Replacement - Follow Nurse / BPA Driven Protocol    sodium chloride    sodium chloride    tiZANidine    Physical Examination:  General: Awake, alert, pleasant and conversant.  No acute distress.  Vitals:    25 1128   BP: 158/84   Pulse: 88   Resp:    Temp:    SpO2: 96%     Systolic (24hrs), Avg:160 , Min:149 , Max:185     Diastolic (24hrs), Av, Min:78, Max:98    Pulse  Av.9  Min: 75  Max: 93    Temp (24hrs), Av.9 °F (36.6 °C), Min:97.5 °F (36.4 °C), Max:98.1 °F (36.7 °C)      Neurological: Cranial nerves II through XII are grossly intact.  Proprioception intact.  Sensation is intact to light touch throughout.  She reports radicular pain that radiates from her low back down into her right thigh stopping at the  knee.  Musculoskeletal: 5 out of 5 strength both proximally and distally in all 4 extremities but patient does report pain with movement in the right lower extremity.  Vascular: 2+ radial pulses bilaterally.  Cardiovascular: Regular rate and rhythm.  Extremities: No clubbing, cyanosis, or edema.  Pulmonary: Normal effort and excursion.  No evidence of respiratory distress.  Psychiatric: Normal mood and affect  HEENT: Normocephalic, atraumatic.  Eyes: No scleral icterus.  Extraocular eye movements are intact, and pupils are equal, round, and reactive to light.    Imaging:  All imaging has been reviewed and independently interpreted.  MRIs of the lumbar thoracic and cervical spine reviewed along with CT scan of the lumbar spine.  There are multilevel degenerative changes throughout with no evidence of significant canal stenosis.  I did have for my comparison a recent study from 6/18/2025 and do not appreciate much in the way of change.  At L2-3 there is a large broad-based disc bulge eccentric to the right contributing to moderate borderline severe bilateral neuroforaminal narrowing.      Assessment and Plan:    Spinal stenosis [M48.00]     This is a 69-year-old female with a several week history of worsening low back pain with radiation into the right lower extremity, consistent with lumbar radiculopathy.  Her imaging reveals multilevel degenerative changes throughout, worse at the L2-3 level where there is a right paracentral disc extrusion contributing to moderate borderline severe right lateral recess stenosis.  She does have multiple medical comorbidities and is somewhat of a high risk surgical candidate.  There was concern for cauda equina syndrome, however I would like to rule that out today.  She may be a candidate for a lumbar fusion in the future but considering her multiple medical comorbidities I would like to exhaust all conservative treatments prior to considering neurosurgical intervention.  I am going  to start her on some Lyrica to help with her radiating leg pain.  At some point in the future, I would consider ordering a lumbar myelogram to further assess the degree of stenosis.  I would like for her to work with physical therapy as well.  We can lift n.p.o. and she can eat.  Continue pain control.  Had a lengthy and protracted conversation with the patient and her  at the bedside this morning.  There is currently no role during this hospitalization for urgent or emergent neurosurgical intervention.  Once we are able to get her pain under control, then I would like to establish outpatient follow-up.  All questions answered at the bedside.  Appreciate the consult.    Lakshmi Larios PA-C        Electronically signed by Sahil Gautam MD at 07/06/25 8205

## 2025-07-07 NOTE — PROGRESS NOTES
[facility] Neurosurgery Services  PROGRESS NOTE    Patient Name: Raquel Mariee  : 1955  MRN: [id number]    Date of Admission: 2025  Length of Stay: 3  Primary Care Physician: Sanjeev Rawls MD    Subjective     CC: Right lower extremity radiculopathy    Admission diagnosis: Spinal stenosis [M48.00]     HPI: Raquel Mariee is a very pleasant 69 y.o. female, retired from nursing, with a 5-month history of lumbosacral stenosis and bilateral sacroiliitis/piriformis syndrome which has been tolerable and managed with injections from [facility].  She is scheduled for a lumbar epidural steroid on July 15 but experienced such severity and constancy of her right lower extremity shooting pain she reported to ED over the weekend.  She is seen over the weekend by my colleague Lakshmi Singh who has started her on Lyrica 75 mg twice daily, patient notes this has improved symptoms significantly however does not last 12 hours.  She notes that she does not feel able to return home, and would like to go to rehab after discharge.    Review of Systems  ROS is negative except as mentioned in the HPI.    Objective     Vital Signs:   Temp:  [97.1 °F (36.2 °C)-98.2 °F (36.8 °C)] 98.2 °F (36.8 °C)  Heart Rate:  [71-95] 73  Resp:  [16] 16  BP: (141-171)/(86-93) 161/87     Pulse  Av.1  Min: 71  Max: 95  Systolic (24hrs), Avg:160 , Min:141 , Max:171     Diastolic (24hrs), Av, Min:86, Max:93    Temp (24hrs), Av.9 °F (36.6 °C), Min:97.1 °F (36.2 °C), Max:98.2 °F (36.8 °C)      I/O this shift:  In: -   Out: 400 [Urine:400]    Results Reviewed:  I have personally reviewed current lab, radiology, and data and agree.    Results from last 7 days   Lab Units 25  0434 25  1227   WBC 10*3/mm3 5.49 6.81   HEMOGLOBIN g/dL 12.5 12.1   HEMATOCRIT % 36.8 35.4   PLATELETS 10*3/mm3 205 211     Results from last 7 days   Lab Units 25  0434 25  1227   SODIUM mmol/L  131* 127*   POTASSIUM mmol/L 4.4 4.5   CHLORIDE mmol/L 96* 93*   CO2 mmol/L 23.7 22.8   BUN mg/dL 10.2 15.0   CREATININE mg/dL 0.87 0.95   GLUCOSE mg/dL 160* 130*   CALCIUM mg/dL 8.6 8.9   ALK PHOS U/L  --  44   ALT (SGPT) U/L  --  18   AST (SGOT) U/L  --  30     Estimated Creatinine Clearance: 68.7 mL/min (by C-G formula based on SCr of 0.87 mg/dL).    Microbiology Results Abnormal       None            Imaging Results (Last 24 Hours)       ** No results found for the last 24 hours. **          Results for orders placed in visit on 03/28/25    Adult Transthoracic Echo Complete W/ Cont if Necessary Per Protocol 03/31/2025  2:42 PM    Interpretation Summary  1.  Normal left ventricular size and systolic function, LVEF 50-55%.  2.  Moderate to severe concentric LVH.  3.  Grade 2 diastolic dysfunction.  4.  Normal right ventricular size and systolic function.  5.  Moderately increased left atrial volume index.  6.  Moderate calcification of the aortic valve with moderate aortic stenosis.  7.  Moderate aortic regurgitation.  8.  Mild mitral regurgitation.      I have reviewed the medications:  Scheduled Meds:allopurinol, 100 mg, Oral, Daily  arformoterol, 15 mcg, Nebulization, BID - RT   And  budesonide, 0.5 mg, Nebulization, BID - RT   And  revefenacin, 175 mcg, Nebulization, Daily - RT  buPROPion SR, 150 mg, Oral, BID  cetirizine, 5 mg, Oral, Daily  cholecalciferol, 1,000 Units, Oral, Daily  DULoxetine, 60 mg, Oral, Daily  ferrous sulfate, 325 mg, Oral, Daily With Breakfast  fluticasone, 2 spray, Each Nare, Daily  heparin (porcine), 5,000 Units, Subcutaneous, Q8H  HYDROcodone-acetaminophen, 1 tablet, Oral, Q4H  insulin regular, 2-7 Units, Subcutaneous, 4x Daily PC & at Bedtime  isosorbide mononitrate, 30 mg, Oral, QAM  levothyroxine, 50 mcg, Oral, Daily  montelukast, 10 mg, Oral, Nightly  multivitamin with minerals, 1 tablet, Oral, Daily  pantoprazole, 40 mg, Oral, Q AM  pregabalin, 75 mg, Oral, BID  sodium chloride,  10 mL, Intravenous, Q12H  valsartan, 160 mg, Oral, Daily  vitamin B-12, 1,000 mcg, Oral, Daily      PRN Meds:•  acetaminophen **OR** acetaminophen **OR** acetaminophen  •  albuterol  •  senna-docusate sodium **AND** polyethylene glycol **AND** bisacodyl **AND** bisacodyl  •  Calcium Replacement - Follow Nurse / BPA Driven Protocol  •  dextrose  •  dextrose  •  [Held by provider] furosemide  •  glucagon (human recombinant)  •  HYDROmorphone **AND** naloxone  •  Magnesium Standard Dose Replacement - Follow Nurse / BPA Driven Protocol  •  Morphine **AND** naloxone  •  ondansetron ODT  •  Phosphorus Replacement - Follow Nurse / BPA Driven Protocol  •  Potassium Replacement - Follow Nurse / BPA Driven Protocol  •  sodium chloride  •  sodium chloride  •  tiZANidine    Physical Exam: Today I find patient seated on side of bed with no family at bedside.   she Is alert and oriented, in no acute distress. Able to move all four extremities with no sensory deficits.  She is very tender to palpation over lower lumbar spinous processes as well as bilateral sacroiliac joints and greater trochanters.        Assessment / Plan         Spinal stenosis    CKD (chronic kidney disease), stage III     Bilateral sacroiliitis    I would like patient to continue working with therapy and agree that some time at inpatient rehab would be best.  I will go ahead and increase her Lyrica to 3 times daily which I think will help significantly and allow for improved efforts in therapy.  We will get patient scheduled for a myelogram to evaluate for dynamic listhesis on outpatient basis and have her follow-up with Lakshmi Singh PA-C in a couple of weeks.    Disposition: I expect the patient to be discharged to [facility] [place] in 1-2 days.      Electronically signed by Olga Grajeda PA-C, 07/07/25, 09:50 EDT.

## 2025-07-07 NOTE — DISCHARGE PLACEMENT REQUEST
"Raquel Milan (69 y.o. Female)      [facility] Case Management [phone]       Date of Birth   1955    Social Security Number       Address   [address] [place], [place] [address]    Home Phone   [phone]    MRN   [id number]       Hoahaoism   Worship    Marital Status                               Admission Date   7/4/2025    Admission Type   Urgent    Admitting Provider   Adolfo Velazco MD    Attending Provider   Adolfo Velazco MD    Department, Room/Bed   [facility] 3G, S361/1       Discharge Date       Discharge Disposition       Discharge Destination                                 Attending Provider: Adolfo Velazco MD    Allergies: Cefaclor, Cephalexin, Cephalosporins, Escitalopram Oxalate, Ambien [Zolpidem Tartrate], Cardizem [Diltiazem Hcl], Metoclopramide, Mobic [Meloxicam], Penicillins, Sumatriptan, Topamax [Topiramate], Verapamil, Zolpidem, Amoxicillin, Edecrin [Ethacrynic Acid], Hydrochlorothiazide, Sulfa Antibiotics    Isolation: None   Infection: None   Code Status: CPR    Ht: 165 cm (64.96\")   Wt: 93 kg (205 lb 0.4 oz)    Admission Cmt: None   Principal Problem: Spinal stenosis [M48.00]                   Active Insurance as of 7/4/2025       Primary Coverage       Payor Plan Insurance Group Employer/Plan Group    AETNA MEDICARE REPLACEMENT AETNA MEDICARE ADVANTAGE HMO [id number]       Payor Plan Address Payor Plan Phone Number Payor Plan Fax Number Effective Dates    [address] [phone]  1/1/2024 - None Entered    [place] [address]         Subscriber Name Subscriber Birth Date Member ID       RAQUEL MILAN 1955 [id number]                     Emergency Contacts        (Rel.) Home Phone Work Phone Mobile Phone    KAYLIPAUL (Spouse) [phone] -- [phone]    JOSEPH AZUL (Daughter) [phone] -- [phone]                 History & Physical        Aguilar Mcgarry MD at 07/04/25 [facility], [facility]              [facility] " [facility] Medicine Services  HISTORY AND PHYSICAL    Patient Name: Raquel Mariee  : 1955  MRN: [id number]  Primary Care Physician: Sanjeev Rawls MD  Date of admission: 2025      Subjective  Subjective     Chief Complaint:  Lower back pain    HPI:  Raquel Mariee is a 69 y.o. female with hx asthma, HFpEF, CVID on IVIG monthly, T2DM here with back pain. She has known severe lumbar stenosis following with NSGY and orthopedics. Reports worsening 3d ago when she bent down to  something. Has radicular type pain radiating down right side making it hard to ambulate. Has had one episode of fecal incontinence and known stress urinary incontinence but states this has worsened over past week. Does not report saddle anesthesia or loss of sensation to legs. She was seen at [facility] ED where she was reported to have post void 160cc and decreased rectal tone, with CT showing diffuse DJD of lumbar spine but no cauda equina. MRI was unable to be performed so was transferred here, case was discussed with neurosurgery prior to transfer.      Personal History     Past Medical History:   Diagnosis Date    Anxiety     Aortic valve stenosis     Cardiac murmur     Cataract, bilateral     CHF (congestive heart failure)     Cholelithiasis GB out    Chronic kidney disease     Stage III    Clostridium difficile infection     in her 30s    Colon polyp Ever since having colonoscopies.    Common variable immunodeficiency     IVIG infusions    Compression fracture of lumbar spine, non-traumatic     COPD (chronic obstructive pulmonary disease)     Coronary artery disease     Prinz metal angina & aortic stenosis    Depression     Diabetes mellitus     type II    Eosinophilic asthma     Fibromyalgia, primary     Full dentures     GERD (gastroesophageal reflux disease)     History of transfusion     in  at [facility].  No reaction noted, patient states she does have an antibody from transfusion.     Hypertension     Hypogammaglobulinemia     Hypothyroidism     Irritable bowel syndrome     Migraines     Morbid obesity     Optic neuritis     Optic neuropathy     Osteoarthritis     Prinzmetal angina 1990    Pseudomonas infection 1998    lungs    PTSD (post-traumatic stress disorder)     Pulmonary edema     Renal insufficiency     Sinus tachycardia 1990    Sleep apnea     no longer uses/needs cpap    Stroke     TIA about 7 years ago    Tachycardia     TIA (transient ischemic attack) 2017    Trochanteric bursitis 01/25/2023           Past Surgical History:   Procedure Laterality Date    APPENDECTOMY      BUNIONECTOMY Bilateral     CARDIAC CATHETERIZATION  2017    no stents needed    CARPAL TUNNEL RELEASE Right     COLONOSCOPY  2021    ENDOMETRIAL ABLATION  1997    EYE SURGERY  ? About 6-8 years ago    Laser for glaucoma    FRACTURE SURGERY  1995    No hardware; Tibeal plateau    GASTRIC BYPASS      HAND SURGERY Left     No hardware    HEMORRHOIDECTOMY N/A 04/09/2024    Procedure: HEMORRHOID BANDING X2;  Surgeon: Melissa Beck MD;  Location: [facility] OR;  Service: General;  Laterality: N/A;    INTERSTIM PLACEMENT N/A 05/03/2023    Procedure: INTERSTIM STAGES 1 AND 2 LEAD AND GENERATOR PLACEMENT;  Surgeon: Abner Nation MD;  Location: [facility] OR;  Service: Urology;  Laterality: N/A;    POSTERIOR VAGINAL REPAIR N/A 08/02/2023    Procedure: POSTERIOR COLPORRHAPHY;  Surgeon: Kellen Galan MD;  Location: [facility] OR;  Service: Gynecology;  Laterality: N/A;    RECTAL EXAMINATION UNDER ANESTHESIA N/A 04/09/2024    Procedure: RECTAL EXAM UNDER ANESTHESIA;  Surgeon: Melissa Beck MD;  Location: [facility] OR;  Service: General;  Laterality: N/A;    REPLACEMENT TOTAL KNEE BILATERAL      TEETH EXTRACTION      all of bottom teeth removed    THORACOTOMY      TONSILLECTOMY      TOTAL ABDOMINAL HYSTERECTOMY WITH SALPINGO OOPHORECTOMY      TRACHEAL SURGERY      Repaired via thoracotomy    TUBAL ABDOMINAL LIGATION   1983    VENOUS ACCESS DEVICE (PORT) INSERTION      port a cath in the left chest wall       Family History: family history includes ADD / ADHD in her son; Alcohol abuse in her son; Anxiety disorder in her daughter; Arthritis in her sister; Asthma in her brother, brother, daughter, daughter, father, sister, sister, sister, and son; Bipolar disorder in her daughter; Brain cancer in her sister; Breast cancer in her maternal cousin; COPD in her brother, brother, father, sister, and sister; Cancer in her sister and sister; Dementia in her father; Depression in her daughter, mother, sister, and sister; Diabetes in her brother, brother, father, mother, sister, and sister; Drug abuse in her son; Endometriosis in her daughter, daughter, mother, sister, sister, and sister; Heart attack in her father, sister, and sister; Heart disease in her father, maternal grandfather, maternal uncle, mother, and sister; Hypertension in her brother, daughter, father, mother, sister, and sister; Irritable bowel syndrome in her daughter; Kidney disease in her sister and sister; Kidney failure in her daughter; Mental illness in her daughter; Osteoarthritis in her daughter and daughter; Self-Injurious Behavior  in her daughter; Stroke in her father, mother, and sister; Suicide Attempts in her daughter.     Social History:  reports that she has never smoked. She has never been exposed to tobacco smoke. She has never used smokeless tobacco. She reports current alcohol use. She reports that she does not use drugs.  Social History     Social History Narrative    Not on file       Medications:  Available home medication information reviewed.  Budeson-Glycopyrrol-Formoterol, DULoxetine, Fluticasone Propionate, Hydrocortisone (Perianal), Magnesium, Mepolizumab, Sinus Rinse Kit, Sitz Bath, acetaminophen-codeine, albuterol sulfate HFA, allopurinol, betamethasone valerate, buPROPion SR, calcium carbonate, cholecalciferol, denosumab, diphenhydrAMINE,  estradiol, ferrous sulfate, fexofenadine, fluticasone-salmeterol, furosemide, glucosamine-chondroitin, guaiFENesin-codeine, immune globulin (human), ipratropium, ipratropium-albuterol, isosorbide mononitrate, levothyroxine, lidocaine, linaclotide, montelukast, multivitamin with minerals, omeprazole, ondansetron, potassium citrate, predniSONE, solifenacin, tiZANidine, tiotropium bromide monohydrate, ubrogepant, valsartan, and vitamin B-12    Allergies   Allergen Reactions    Cefaclor Hives and Swelling     Other reaction(s): SWELLING  Shed 4 layers of skin and extremely SOB  Cesario Bishop's syndrome        Cephalexin Other (See Comments)     Cesario-Bishop's syndrome      Cephalosporins Hives, Swelling and Angioedema     Rechallenge with cefipime caused rash on 1/14/08.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    Verapamil Nausea And Vomiting     Dizzy    Zolpidem Other (See Comments) and Unknown (See Comments)     Automatic behaviour in sleep.  confusion    Amoxicillin Rash    Edecrin [Ethacrynic Acid] Rash and Other (See Comments)     Weight gain    Hydrochlorothiazide Hives    Sulfa Antibiotics Hives       Objective  Objective     Vital Signs:   Temp:  [98 °F (36.7 °C)-98.1 °F (36.7 °C)] 98.1 °F (36.7 °C)  Heart Rate:  [81-93] 81  Resp:  [16-18] 16  BP: (139-185)/(78-98) 185/92       Physical Exam   AAOx3   EOMI PERRL  Neck flexion intact  Facial expression intact  Heart RRR  Lungs CTAB  Abd soft, nontender  BL  upper extremity strength intact, symmetric. Pulses symmetric  BL LE without sensory loss. No clonus. 4/5 strength RLE, 5/5 LLE. Patellar reflex 1+ BL. Pulses intact, symmetric    Result Review:  I have personally reviewed the results from the time of this admission to 7/4/2025 22:43 EDT and agree with these findings:  [x]  Laboratory list / accordion  []  Microbiology  [x]  Radiology  [x]  EKG/Telemetry   []  Cardiology/Vascular   []  Pathology  [x]  Old records  []  Other:  Most notable findings include: see A+P      LAB RESULTS:      Lab 07/04/25  1227   WBC 6.81   HEMOGLOBIN 12.1   HEMATOCRIT 35.4   PLATELETS 211   NEUTROS ABS 5.31   IMMATURE GRANS (ABS) 0.04   LYMPHS ABS 0.92   MONOS ABS 0.51   EOS ABS 0.00   MCV 93.7         Lab 07/04/25  1227   SODIUM 127*   POTASSIUM 4.5   CHLORIDE 93*   CO2 22.8   ANION GAP 11.2   BUN 15.0   CREATININE 0.95   EGFR 65.0   GLUCOSE 130*   CALCIUM 8.9         Lab 07/04/25  1227   TOTAL PROTEIN 7.0   ALBUMIN 3.8   GLOBULIN 3.2   ALT (SGPT) 18   AST (SGOT) 30   BILIRUBIN 0.4   ALK PHOS 44                     UA          7/4/2025    13:27   Urinalysis   Squamous Epithelial Cells, UA 3-6    Specific Gravity, UA 1.015    Ketones, UA Negative    Blood, UA Trace    Leukocytes, UA Small (1+)    Nitrite, UA Negative    RBC, UA 0-2    WBC, UA 6-10    Bacteria, UA Trace        Microbiology Results (last 10 days)       ** No results found for the last 240 hours. **            CT Abdomen Pelvis With Contrast  Result Date: 7/4/2025  PROCEDURE: CT ABDOMEN PELVIS W CONTRAST-  HISTORY:  Low back pain, urinary incontinence, fecal incontinence, eval distended bladder or bowel  COMPARISON:  None .  TECHNIQUE: Multiple axial CT images were obtained from the lung bases through the pubic symphysis following the administration of Isovue 300 contrast.  FINDINGS:  ABDOMEN: There is a trace right pleural effusion. There is right pleural thickening. Right middle lobe atelectasis is seen. The heart is  normal in size. There are postsurgical changes of gastric bypass. The liver is normal. Gallbladder surgically absent. The spleen is unremarkable. No adrenal mass is present.  The pancreas is normal. There is a 3 mm nonobstructing left renal stone. The aorta is normal in caliber. There is no free fluid or adenopathy.  A moderate amount of stool is seen in the transverse colon.  PELVIS: The appendix is not identified. There is sigmoid diverticulosis. The uterus is surgically absent. The urinary bladder is unremarkable. There is no significant free fluid or adenopathy.      Impression: Right pleural thickening and right middle lobe atelectasis may be inflammatory. Short interval follow-up chest CT is recommended.  Moderate amount of stool in the transverse colon is consistent with constipation.  Left nephrolithiasis without hydronephrosis. .   CTDI: 22.43 mGy DLP: 1130.99 mGy.cm   This study was performed with techniques to keep radiation doses as low as reasonably achievable (ALARA). Individualized dose reduction techniques using automated exposure control or adjustment of mA and/or kV according to the patient size were employed.      Images were reviewed, interpreted, and dictated by Dr. Kwaku Grant MD Transcribed by Leyda Rachel PA-C.  This report was signed and finalized on 7/4/2025 2:01 PM by Kwaku Grant MD.      CT Lumbar Spine Without Contrast  Result Date: 7/4/2025  PROCEDURE: CT LUMBAR SPINE WO CONTRAST-  HISTORY: Low back pain, urinary incontinence, eval fracture  TECHNIQUE: Multiple axial CT images were obtained through the lumbar spine using bone window algorithms. Coronal and sagittal images were reconstructed from the original axial data set.  FINDINGS: There is spaces are moderately diffusely degenerated. There is anterolisthesis of L4 on L5. There is no acute fracture.  There are disc bulges and facet hypertrophy throughout the lumbar spine. This results in canal stenosis and  neuroforaminal narrowing throughout the lower lumbar spine.          Impression: Degenerative disc disease.  No acute fracture.     DLP: 1130.99 mGy.cm CTDI: 22.43 mGy   This study was performed with techniques to keep radiation doses as low as reasonably achievable (ALARA). Individualized dose reduction techniques using automated exposure control or adjustment of mA and/or kV according to the patient size were employed.     Images were reviewed, interpreted, and dictated by Dr. Kwaku Grant MD Transcribed by Leyda Rachel PA-C.  This report was signed and finalized on 7/4/2025 2:01 PM by Kwaku Grant MD.        Results for orders placed in visit on 03/28/25    Adult Transthoracic Echo Complete W/ Cont if Necessary Per Protocol 03/31/2025  2:42 PM    Interpretation Summary  1.  Normal left ventricular size and systolic function, LVEF 50-55%.  2.  Moderate to severe concentric LVH.  3.  Grade 2 diastolic dysfunction.  4.  Normal right ventricular size and systolic function.  5.  Moderately increased left atrial volume index.  6.  Moderate calcification of the aortic valve with moderate aortic stenosis.  7.  Moderate aortic regurgitation.  8.  Mild mitral regurgitation.      Assessment & Plan  Assessment & Plan       Spinal stenosis    CKD (chronic kidney disease), stage III    KALYN (obstructive sleep apnea)    Hypothyroidism    Chronic diastolic heart failure    Aortic valve stenosis and insufficiency, rheumatic    CAD (coronary artery disease)    Lumbar stenosis  -known severe lumbar stenosis with worsening 3d ago, has had one episode of fecal incontinence and known stress urinary incontinence but states this has worsened over past week. Does not report saddle anesthesia or loss of sensation to legs. Exam findings mainly reveal RLE weakness, although limited by radicular type pain. Unable to get MRI so she was transferred here, NSGY made aware.   -STAT MRIs ordered  -start decadron  -Discussed with on  call NSGY, will let them know once images available    Hyponatremia  -acute on chronic, baseline 132, 133. Currently 127. Looks euvolemic on exam. Unclear etiology at this time. Will hold home diuretic and check TSH, urine studies.    Asthma  -stable, continue home inhalers    HTN  -restart home meds    HFpEF  -stable, holding furosemide 2/2 hyponatremia    T2DM w/ neuropathy  -start ssi. Cotninue duloxetine    CVID  -on chronic IVIG infusions    Hypothyroidism  -synthroid, checking TSH    ALMAZ  -iron supplements    GERD  -ppi    Depression  -c/w home meds        VTE Prophylaxis:  Pharmacologic VTE prophylaxis orders are present.          CODE STATUS:    Code Status and Medical Interventions: CPR (Attempt to Resuscitate); Full Support   Ordered at: 07/04/25 2236     Code Status (Patient has no pulse and is not breathing):    CPR (Attempt to Resuscitate)     Medical Interventions (Patient has pulse or is breathing):    Full Support       Expected Discharge   Expected discharge date/ time has not been documented.     Aguilar Mcgarry MD  07/04/25      Electronically signed by Aguilar Mcgarry MD at 07/04/25 2254       Current Facility-Administered Medications   Medication Dose Route Frequency Provider Last Rate Last Admin    acetaminophen (TYLENOL) tablet 650 mg  650 mg Oral Q4H PRN Aguilar Mcgarry MD        Or    acetaminophen (TYLENOL) 160 MG/5ML oral solution 650 mg  650 mg Oral Q4H PRN Aguilar Mcgarry MD        Or    acetaminophen (TYLENOL) suppository 650 mg  650 mg Rectal Q4H PRN Aguilar Mcgarry MD        albuterol (PROVENTIL) nebulizer solution 0.083% 2.5 mg/3mL  2.5 mg Nebulization Q6H PRN Aguilar Mcgarry MD        allopurinol (ZYLOPRIM) tablet 100 mg  100 mg Oral Daily Aguilar Mcgarry MD   100 mg at 07/07/25 0925    arformoterol (BROVANA) nebulizer solution 15 mcg  15 mcg Nebulization BID - RT Aguilar Mcgarry MD   15 mcg at 07/07/25 1042    And    budesonide (PULMICORT) nebulizer solution 0.5 mg   0.5 mg Nebulization BID - RT Aguilar Mcgarry MD   0.5 mg at 07/07/25 1047    And    revefenacin (YUPELRI) nebulizer solution 175 mcg  175 mcg Nebulization Daily - RT Aguilar Mcgarry MD   175 mcg at 07/07/25 1053    sennosides-docusate (PERICOLACE) 8.6-50 MG per tablet 2 tablet  2 tablet Oral BID PRN Aguilar Mcgarry MD        And    polyethylene glycol (MIRALAX) packet 17 g  17 g Oral Daily PRN Aguilar Mcgarry MD        And    bisacodyl (DULCOLAX) EC tablet 5 mg  5 mg Oral Daily PRN Aguilar Mcgarry MD        And    bisacodyl (DULCOLAX) suppository 10 mg  10 mg Rectal Daily PRN Aguilar Mcgarry MD        buPROPion SR (WELLBUTRIN SR) 12 hr tablet 150 mg  150 mg Oral BID Aguilar Mcgarry MD   150 mg at 07/06/25 0800    Calcium Replacement - Follow Nurse / BPA Driven Protocol   Not Applicable PRN Aguilar Mcgarry MD        cetirizine (zyrTEC) tablet 5 mg  5 mg Oral Daily Aguilar Mcgarry MD   5 mg at 07/07/25 0925    cholecalciferol (VITAMIN D3) tablet 1,000 Units  1,000 Units Oral Daily Aguilar Mcgarry MD   1,000 Units at 07/07/25 0924    dextrose (D50W) (25 g/50 mL) IV injection 25 g  25 g Intravenous Q15 Min PRN Aguilar Mcgarry MD        dextrose (GLUTOSE) oral gel 15 g  15 g Oral Q15 Min PRN Aguilar Mcgarry MD        DULoxetine (CYMBALTA) DR capsule 60 mg  60 mg Oral Daily Aguilar Mcgarry MD   60 mg at 07/07/25 0925    ferrous sulfate tablet 325 mg  325 mg Oral Daily With Breakfast Aguilar Mcgarry MD   325 mg at 07/07/25 0925    fluticasone (FLONASE) 50 MCG/ACT nasal spray 2 spray  2 spray Each Nare Daily Aguilar Mcgarry MD   2 spray at 07/07/25 0924    [Held by provider] furosemide (LASIX) tablet 40 mg  40 mg Oral Daily PRN Aguilar Mcgarry MD        glucagon (GLUCAGEN) injection 1 mg  1 mg Intramuscular Q15 Min PRN Aguilar Mcgarry MD        heparin (porcine) 5000 UNIT/ML injection 5,000 Units  5,000 Units Subcutaneous Q8H Aguilar Mcgarry MD   5,000 Units at 07/07/25 0520     HYDROcodone-acetaminophen (NORCO) 5-325 MG per tablet 1 tablet  1 tablet Oral Q4H Sahil Gautam MD   1 tablet at 07/07/25 0925    HYDROmorphone (DILAUDID) injection 0.25 mg  0.25 mg Intravenous Q4H PRN Yuniel Verduzco MD        And    naloxone (NARCAN) injection 0.4 mg  0.4 mg Intravenous Q5 Min PRN Yuniel Verduzco MD        Insulin Lispro (humaLOG) injection 2-7 Units  2-7 Units Subcutaneous 4x Daily AC & at Bedtime Adolfo Velazco MD        isosorbide mononitrate (IMDUR) 24 hr tablet 30 mg  30 mg Oral QAM Aguilar Mcgarry MD   30 mg at 07/07/25 0536    levothyroxine (SYNTHROID, LEVOTHROID) tablet 50 mcg  50 mcg Oral Daily Aguilar Mcgarry MD   50 mcg at 07/07/25 0925    Magnesium Standard Dose Replacement - Follow Nurse / BPA Driven Protocol   Not Applicable PRN Aguilar Mcgarry MD        montelukast (SINGULAIR) tablet 10 mg  10 mg Oral Nightly Aguilar Mcgarry MD   10 mg at 07/06/25 2058    morphine injection 1 mg  1 mg Intravenous Q4H PRN Aguilar Mcgarry MD        And    naloxone (NARCAN) injection 0.4 mg  0.4 mg Intravenous Q5 Min PRN Aguilar Mcgarry MD        multivitamin with minerals 1 tablet  1 tablet Oral Daily Aguilar Mcgarry MD   1 tablet at 07/07/25 0925    ondansetron ODT (ZOFRAN-ODT) disintegrating tablet 4 mg  4 mg Translingual Q6H PRN Lakshmi Larios PA-C   4 mg at 07/05/25 1054    pantoprazole (PROTONIX) EC tablet 40 mg  40 mg Oral Q AM Aguilar Mcgarry MD   40 mg at 07/07/25 0534    Phosphorus Replacement - Follow Nurse / BPA Driven Protocol   Not Applicable PRN Aguilar Mcgarry MD        Potassium Replacement - Follow Nurse / BPA Driven Protocol   Not Applicable PRN Aguilar Mcgarry MD        pregabalin (LYRICA) capsule 75 mg  75 mg Oral Q8H Olga Grajeda PA        sodium chloride 0.9 % flush 10 mL  10 mL Intravenous Q12H Aguilar Mcgarry MD   10 mL at 07/07/25 0936    sodium chloride 0.9 % flush 10 mL  10 mL Intravenous PRN Aguilar Mcgarry MD        sodium chloride  0.9 % infusion 40 mL  40 mL Intravenous PRN Aguilar Mcgarry MD        tiZANidine (ZANAFLEX) tablet 4 mg  4 mg Oral BID PRN Aguilar Mcgarry MD   4 mg at 25 1137    valsartan (DIOVAN) tablet 160 mg  160 mg Oral Daily Aguilar Mcgarry MD   160 mg at 25 0925    vitamin B-12 (CYANOCOBALAMIN) tablet 1,000 mcg  1,000 mcg Oral Daily Aguilar Mcgarry MD   1,000 mcg at 25 0924        Physician Progress Notes (most recent note)        Olga Grajeda PA at 25 0950                    [facility] Neurosurgery Services  PROGRESS NOTE    Patient Name: Raquel Mariee  : 1955  MRN: [id number]    Date of Admission: 2025  Length of Stay: 3  Primary Care Physician: Sanjeev Rawls MD    Subjective     CC: Right lower extremity radiculopathy    Admission diagnosis: Spinal stenosis [M48.00]     HPI: Raquel Mariee is a very pleasant 69 y.o. female, retired from nursing, with a 5-month history of lumbosacral stenosis and bilateral sacroiliitis/piriformis syndrome which has been tolerable and managed with injections from [place] pain management.  She is scheduled for a lumbar epidural steroid on July 15 but experienced such severity and constancy of her right lower extremity shooting pain she reported to ED over the weekend.  She is seen over the weekend by my colleague Lakshmi Singh who has started her on Lyrica 75 mg twice daily, patient notes this has improved symptoms significantly however does not last 12 hours.  She notes that she does not feel able to return home, and would like to go to rehab after discharge.    Review of Systems  ROS is negative except as mentioned in the HPI.    Objective     Vital Signs:   Temp:  [97.1 °F (36.2 °C)-98.2 °F (36.8 °C)] 98.2 °F (36.8 °C)  Heart Rate:  [71-95] 73  Resp:  [16] 16  BP: (141-171)/(86-93) 161/87     Pulse  Av.1  Min: 71  Max: 95  Systolic (24hrs), Avg:160 , Min:141 , Max:171     Diastolic (24hrs), Av, Min:86,  Max:93    Temp (24hrs), Av.9 °F (36.6 °C), Min:97.1 °F (36.2 °C), Max:98.2 °F (36.8 °C)      I/O this shift:  In: -   Out: 400 [Urine:400]    Results Reviewed:  I have personally reviewed current lab, radiology, and data and agree.    Results from last 7 days   Lab Units 25  0434 25  1227   WBC 10*3/mm3 5.49 6.81   HEMOGLOBIN g/dL 12.5 12.1   HEMATOCRIT % 36.8 35.4   PLATELETS 10*3/mm3 205 211     Results from last 7 days   Lab Units 25  0434 25  1227   SODIUM mmol/L 131* 127*   POTASSIUM mmol/L 4.4 4.5   CHLORIDE mmol/L 96* 93*   CO2 mmol/L 23.7 22.8   BUN mg/dL 10.2 15.0   CREATININE mg/dL 0.87 0.95   GLUCOSE mg/dL 160* 130*   CALCIUM mg/dL 8.6 8.9   ALK PHOS U/L  --  44   ALT (SGPT) U/L  --  18   AST (SGOT) U/L  --  30     Estimated Creatinine Clearance: 68.7 mL/min (by C-G formula based on SCr of 0.87 mg/dL).    Microbiology Results Abnormal       None            Imaging Results (Last 24 Hours)       ** No results found for the last 24 hours. **          Results for orders placed in visit on 25    Adult Transthoracic Echo Complete W/ Cont if Necessary Per Protocol 2025  2:42 PM    Interpretation Summary  1.  Normal left ventricular size and systolic function, LVEF 50-55%.  2.  Moderate to severe concentric LVH.  3.  Grade 2 diastolic dysfunction.  4.  Normal right ventricular size and systolic function.  5.  Moderately increased left atrial volume index.  6.  Moderate calcification of the aortic valve with moderate aortic stenosis.  7.  Moderate aortic regurgitation.  8.  Mild mitral regurgitation.      I have reviewed the medications:  Scheduled Meds:allopurinol, 100 mg, Oral, Daily  arformoterol, 15 mcg, Nebulization, BID - RT   And  budesonide, 0.5 mg, Nebulization, BID - RT   And  revefenacin, 175 mcg, Nebulization, Daily - RT  buPROPion SR, 150 mg, Oral, BID  cetirizine, 5 mg, Oral, Daily  cholecalciferol, 1,000 Units, Oral, Daily  DULoxetine, 60 mg, Oral,  Daily  ferrous sulfate, 325 mg, Oral, Daily With Breakfast  fluticasone, 2 spray, Each Nare, Daily  heparin (porcine), 5,000 Units, Subcutaneous, Q8H  HYDROcodone-acetaminophen, 1 tablet, Oral, Q4H  insulin regular, 2-7 Units, Subcutaneous, 4x Daily PC & at Bedtime  isosorbide mononitrate, 30 mg, Oral, QAM  levothyroxine, 50 mcg, Oral, Daily  montelukast, 10 mg, Oral, Nightly  multivitamin with minerals, 1 tablet, Oral, Daily  pantoprazole, 40 mg, Oral, Q AM  pregabalin, 75 mg, Oral, BID  sodium chloride, 10 mL, Intravenous, Q12H  valsartan, 160 mg, Oral, Daily  vitamin B-12, 1,000 mcg, Oral, Daily      PRN Meds:  acetaminophen **OR** acetaminophen **OR** acetaminophen    albuterol    senna-docusate sodium **AND** polyethylene glycol **AND** bisacodyl **AND** bisacodyl    Calcium Replacement - Follow Nurse / BPA Driven Protocol    dextrose    dextrose    [Held by provider] furosemide    glucagon (human recombinant)    HYDROmorphone **AND** naloxone    Magnesium Standard Dose Replacement - Follow Nurse / BPA Driven Protocol    Morphine **AND** naloxone    ondansetron ODT    Phosphorus Replacement - Follow Nurse / BPA Driven Protocol    Potassium Replacement - Follow Nurse / BPA Driven Protocol    sodium chloride    sodium chloride    tiZANidine    Physical Exam: Today I find patient seated on side of bed with no family at bedside.   she Is alert and oriented, in no acute distress. Able to move all four extremities with no sensory deficits.  She is very tender to palpation over lower lumbar spinous processes as well as bilateral sacroiliac joints and greater trochanters.        Assessment / Plan         Spinal stenosis    CKD (chronic kidney disease), stage III     Bilateral sacroiliitis    I would like patient to continue working with therapy and agree that some time at inpatient rehab would be best.  I will go ahead and increase her Lyrica to 3 times daily which I think will help significantly and allow for improved  efforts in therapy.  We will get patient scheduled for a myelogram to evaluate for dynamic listhesis on outpatient basis and have her follow-up with Lakshmi iSngh PA-C in a couple of weeks.    Disposition: I expect the patient to be discharged to [facility] in 1-2 days.      Electronically signed by Olga Grajeda PA-C, 07/07/25, 09:50 EDT.          Electronically signed by Olga Grajeda PA at 07/07/25 1125          Consult Notes (most recent note)        Lakshmi Larios PA-C at 07/05/25 1048        Consult Orders    1. Inpatient Neurosurgery Consult [[id number]] ordered by Aguilar Mcgarry MD at 07/05/25 0236              Attestation signed by Sahil Gautam MD at 07/06/25 0809      I have reviewed this documentation and agree.                      Reason for consultation: Lumbar radiculopathy    Referring Physician:  Vahe Butler PA-C     Chief complaint low back pain with radiation into the right lower extremity    Admit Diagnosis:   Spinal stenosis [M48.00]     History of present illness:  This is a 69-year-old female who presented to the [facility] ED yesterday evening with a several day history of worsening low back pain with radiation into the right lower extremity to the point where it was difficult for her to ambulate.  She was also reporting some episodes of fecal incontinence and she does have a history of known stress urinary incontinence but feels that it had gotten worse over the past week.  Patient denied any symptoms of numbness/tingling, or saddle anesthesia.  She does feel weak in her right lower extremity secondary to the pain.  CT scan of the lumbar spine did reveal multilevel degenerative changes throughout.  Neurosurgical consultation was ultimately requested and we recommended MRI of the lumbar spine, however their machine was down.  She was ultimately transferred to our facility to undergo MRI.  During my bedside visit today, she reports right lower extremity pain to the  point where it is difficult for her to ambulate.  She denies saddle anesthesia.  She does feel weak in the right lower extremity but reports that this is mostly secondary to her radiating radicular pain.    ROS:  A 12 point review of systems was performed.  All systems reviewed were negative with the exception of those mentioned in the history of present illness.    Past medical history:  Past Medical History:   Diagnosis Date    Anxiety     Aortic valve stenosis     Cardiac murmur     Cataract, bilateral     CHF (congestive heart failure)     Cholelithiasis GB out    Chronic kidney disease     Stage III    Clostridium difficile infection     in her 30s    Colon polyp Ever since having colonoscopies.    Common variable immunodeficiency     IVIG infusions    Compression fracture of lumbar spine, non-traumatic     COPD (chronic obstructive pulmonary disease)     Coronary artery disease     Prinz metal angina & aortic stenosis    Depression     Diabetes mellitus     type II    Eosinophilic asthma     Fibromyalgia, primary     Full dentures     GERD (gastroesophageal reflux disease)     History of transfusion     in 1979 at [facility].  No reaction noted, patient states she does have an antibody from transfusion.    Hypertension     Hypogammaglobulinemia     Hypothyroidism     Irritable bowel syndrome     Migraines     Morbid obesity     Optic neuritis     Optic neuropathy     Osteoarthritis     Prinzmetal angina 1990    Pseudomonas infection 1998    lungs    PTSD (post-traumatic stress disorder)     Pulmonary edema     Renal insufficiency     Sinus tachycardia 1990    Sleep apnea     no longer uses/needs cpap    Stroke     TIA about 7 years ago    Tachycardia     TIA (transient ischemic attack) 2017    Trochanteric bursitis 01/25/2023       Past surgical history:  Past Surgical History:   Procedure Laterality Date    APPENDECTOMY      BUNIONECTOMY Bilateral     CARDIAC CATHETERIZATION  2017    no stents  needed    CARPAL TUNNEL RELEASE Right     COLONOSCOPY  2021    ENDOMETRIAL ABLATION  1997    EYE SURGERY  ? About 6-8 years ago    Laser for glaucoma    FRACTURE SURGERY  1995    No hardware; Tibeal plateau    GASTRIC BYPASS      HAND SURGERY Left     No hardware    HEMORRHOIDECTOMY N/A 04/09/2024    Procedure: HEMORRHOID BANDING X2;  Surgeon: Melissa Beck MD;  Location: [facility] OR;  Service: General;  Laterality: N/A;    INTERSTIM PLACEMENT N/A 05/03/2023    Procedure: INTERSTIM STAGES 1 AND 2 LEAD AND GENERATOR PLACEMENT;  Surgeon: Abner Nation MD;  Location: [facility] OR;  Service: Urology;  Laterality: N/A;    POSTERIOR VAGINAL REPAIR N/A 08/02/2023    Procedure: POSTERIOR COLPORRHAPHY;  Surgeon: Kellen Galan MD;  Location: [facility] OR;  Service: Gynecology;  Laterality: N/A;    RECTAL EXAMINATION UNDER ANESTHESIA N/A 04/09/2024    Procedure: RECTAL EXAM UNDER ANESTHESIA;  Surgeon: Melissa Beck MD;  Location: [facility] OR;  Service: General;  Laterality: N/A;    REPLACEMENT TOTAL KNEE BILATERAL      TEETH EXTRACTION      all of bottom teeth removed    THORACOTOMY      TONSILLECTOMY      TOTAL ABDOMINAL HYSTERECTOMY WITH SALPINGO OOPHORECTOMY      TRACHEAL SURGERY      Repaired via thoracotomy    TUBAL ABDOMINAL LIGATION  1983    VENOUS ACCESS DEVICE (PORT) INSERTION      port a cath in the left chest wall       Social history:  Social History     Socioeconomic History    Marital status:    Tobacco Use    Smoking status: Never     Passive exposure: Never    Smokeless tobacco: Never   Vaping Use    Vaping status: Never Used   Substance and Sexual Activity    Alcohol use: Yes     Comment: ONCE A YEAR    Drug use: Never    Sexual activity: Defer       Family history:  Family History   Problem Relation Age of Onset    Diabetes Mother     Stroke Mother     Heart disease Mother     Hypertension Mother     Endometriosis Mother     Depression Mother     Diabetes Father     Hypertension Father      Stroke Father     Heart attack Father     Asthma Father     COPD Father     Dementia Father     Heart disease Father     COPD Sister     Asthma Sister     Cancer Sister         Nasal cell skin    Brain cancer Sister     Diabetes Sister     Stroke Sister     Heart attack Sister     Endometriosis Sister     Depression Sister     Arthritis Sister         Rheumatoid    Hypertension Sister     Kidney disease Sister     Diabetes Sister     COPD Sister     Asthma Sister     Endometriosis Sister     Depression Sister     Cancer Sister         Glioma    Heart disease Sister         MI    Heart attack Sister     Endometriosis Sister     Asthma Sister     Hypertension Sister     Kidney disease Sister         ESRD    COPD Brother     Asthma Brother         Turned into COPD    Diabetes Brother     Asthma Brother     COPD Brother     Diabetes Brother     Hypertension Brother     Asthma Daughter     Endometriosis Daughter     Osteoarthritis Daughter     Hypertension Daughter     Kidney failure Daughter     Irritable bowel syndrome Daughter     Anxiety disorder Daughter     Bipolar disorder Daughter     Depression Daughter     Self-Injurious Behavior  Daughter     Suicide Attempts Daughter     Mental illness Daughter         Bipolar and eating fisorder    Asthma Daughter     Endometriosis Daughter     Osteoarthritis Daughter     Asthma Son     ADD / ADHD Son     Alcohol abuse Son     Drug abuse Son     Breast cancer Maternal Cousin     Heart disease Maternal Grandfather     Heart disease Maternal Uncle     OCD Neg Hx     Paranoid behavior Neg Hx     Schizophrenia Neg Hx     Seizures Neg Hx     Ovarian cancer Neg Hx        Allergies:  Allergies   Allergen Reactions    Cefaclor Hives and Swelling     Other reaction(s): SWELLING  Shed 4 layers of skin and extremely SOB  Cesario Bishop's syndrome        Cephalexin Other (See Comments)     Cesario-Bishop's syndrome      Cephalosporins Hives, Swelling and Angioedema     Rechallenge  with cefipime caused rash on 1/14/08.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    Verapamil Nausea And Vomiting     Dizzy    Zolpidem Other (See Comments) and Unknown (See Comments)     Automatic behaviour in sleep.  confusion    Amoxicillin Rash    Edecrin [Ethacrynic Acid] Rash and Other (See Comments)     Weight gain    Hydrochlorothiazide Hives    Sulfa Antibiotics Hives       Outpatient medications:  Scheduled Meds:allopurinol, 100 mg, Oral, Daily  arformoterol, 15 mcg, Nebulization, BID - RT   And  budesonide, 0.5 mg, Nebulization, BID - RT   And  revefenacin, 175 mcg, Nebulization, Daily - RT  buPROPion SR, 150 mg, Oral, BID  cetirizine, 5 mg, Oral, Daily  cholecalciferol, 1,000 Units, Oral, Daily  dexAMETHasone, 4 mg, Intravenous, Q6H  DULoxetine, 60 mg, Oral, Daily  ferrous sulfate, 325 mg, Oral, Daily With Breakfast  fluticasone, 2 spray, Each Nare, Daily  heparin (porcine), 5,000 Units, Subcutaneous, Q8H  insulin regular, 2-7 Units, Subcutaneous, Q6H  isosorbide mononitrate, 30 mg, Oral, QAM  levothyroxine, 50 mcg, Oral, Daily  montelukast, 10 mg, Oral, Nightly  multivitamin with minerals, 1 tablet, Oral, Daily  pantoprazole, 40 mg, Oral, Q AM  pregabalin, 75 mg, Oral, BID  sodium chloride, 10 mL, Intravenous, Q12H  valsartan, 160 mg, Oral, Daily  vitamin B-12, 1,000 mcg, Oral, Daily      Continuous Infusions:   PRN Meds:.  acetaminophen **OR** acetaminophen **OR** acetaminophen    albuterol     senna-docusate sodium **AND** polyethylene glycol **AND** bisacodyl **AND** bisacodyl    Calcium Replacement - Follow Nurse / BPA Driven Protocol    dextrose    dextrose    [Held by provider] furosemide    glucagon (human recombinant)    HYDROmorphone **AND** naloxone    Magnesium Standard Dose Replacement - Follow Nurse / BPA Driven Protocol    Morphine **AND** naloxone    ondansetron ODT    Phosphorus Replacement - Follow Nurse / BPA Driven Protocol    Potassium Replacement - Follow Nurse / BPA Driven Protocol    sodium chloride    sodium chloride    tiZANidine    Physical Examination:  General: Awake, alert, pleasant and conversant.  No acute distress.  Vitals:    25 1128   BP: 158/84   Pulse: 88   Resp:    Temp:    SpO2: 96%     Systolic (24hrs), Avg:160 , Min:149 , Max:185     Diastolic (24hrs), Av, Min:78, Max:98    Pulse  Av.9  Min: 75  Max: 93    Temp (24hrs), Av.9 °F (36.6 °C), Min:97.5 °F (36.4 °C), Max:98.1 °F (36.7 °C)      Neurological: Cranial nerves II through XII are grossly intact.  Proprioception intact.  Sensation is intact to light touch throughout.  She reports radicular pain that radiates from her low back down into her right thigh stopping at the knee.  Musculoskeletal: 5 out of 5 strength both proximally and distally in all 4 extremities but patient does report pain with movement in the right lower extremity.  Vascular: 2+ radial pulses bilaterally.  Cardiovascular: Regular rate and rhythm.  Extremities: No clubbing, cyanosis, or edema.  Pulmonary: Normal effort and excursion.  No evidence of respiratory distress.  Psychiatric: Normal mood and affect  HEENT: Normocephalic, atraumatic.  Eyes: No scleral icterus.  Extraocular eye movements are intact, and pupils are equal, round, and reactive to light.    Imaging:  All imaging has been reviewed and independently interpreted.  MRIs of the lumbar thoracic and cervical spine reviewed along with CT scan of the lumbar spine.   There are multilevel degenerative changes throughout with no evidence of significant canal stenosis.  I did have for my comparison a recent study from 6/18/2025 and do not appreciate much in the way of change.  At L2-3 there is a large broad-based disc bulge eccentric to the right contributing to moderate borderline severe bilateral neuroforaminal narrowing.      Assessment and Plan:    Spinal stenosis [M48.00]     This is a 69-year-old female with a several week history of worsening low back pain with radiation into the right lower extremity, consistent with lumbar radiculopathy.  Her imaging reveals multilevel degenerative changes throughout, worse at the L2-3 level where there is a right paracentral disc extrusion contributing to moderate borderline severe right lateral recess stenosis.  She does have multiple medical comorbidities and is somewhat of a high risk surgical candidate.  There was concern for cauda equina syndrome, however I would like to rule that out today.  She may be a candidate for a lumbar fusion in the future but considering her multiple medical comorbidities I would like to exhaust all conservative treatments prior to considering neurosurgical intervention.  I am going to start her on some Lyrica to help with her radiating leg pain.  At some point in the future, I would consider ordering a lumbar myelogram to further assess the degree of stenosis.  I would like for her to work with physical therapy as well.  We can lift n.p.o. and she can eat.  Continue pain control.  Had a lengthy and protracted conversation with the patient and her  at the bedside this morning.  There is currently no role during this hospitalization for urgent or emergent neurosurgical intervention.  Once we are able to get her pain under control, then I would like to establish outpatient follow-up.  All questions answered at the bedside.  Appreciate the consult.    Lakshmi Larios PA-C        Electronically signed by  Sahil Gautam MD at 25 0873          Physical Therapy Notes (most recent note)        Apolinar Butts PT at 25 [phone]  Version 1 of 1         Patient Name: Raquel Mariee  : 1955    MRN: [id number]                              Today's Date: 2025       Admit Date: 2025    Visit Dx: No diagnosis found.  Patient Active Problem List   Diagnosis    Hypogammaglobulinemia    Gastric bypass status for obesity    CKD (chronic kidney disease), stage III    KALYN (obstructive sleep apnea)    Major depressive disorder in partial remission    Hypothyroidism    Fibromyalgia syndrome    Abdominal aortic aneurysm (AAA) without rupture    Nonrheumatic aortic valve stenosis    Vitamin D deficiency, unspecified     Anatomical narrow angle borderline glaucoma    Abnormal finding of blood chemistry, unspecified     Chronic diastolic heart failure    Prinzmetal angina    Common variable agammaglobulinemia    Polymyositis    Port-A-Cath in place    Abnormal CT of the chest    Acute bronchospasm    Acute kidney injury    Sinusitis, chronic    Anemia    Aortic valve stenosis and insufficiency, rheumatic    Body mass index (BMI) of 40.0 to 44.9 in adult    Calcium kidney stones    Choroidal nevus of left eye    CAD (coronary artery disease)    Claw toe, acquired    Combined forms of age-related cataract of both eyes    Depressive disorder    Essential hypertension with goal blood pressure less than 140/90    Severe persistent asthma with exacerbation    Glaucoma suspect of both eyes    Inappropriate sinus tachycardia    Migraine without aura    Mitral valve regurgitation    Osteoarthrosis involving lower leg    Pain in joint involving lower leg    Posterior vitreous detachment of both eyes    Primary osteoarthritis of left wrist    Anxiety disorder    Status post total knee replacement    Sleep disturbances    Senile nuclear sclerosis    Rotator cuff syndrome of left shoulder    Pure  hypercholesterolemia    Thoracic aortic aneurysm without rupture    TIA (transient ischemic attack)    Tracheomalacia    Trochlear nerve palsy, left    Diabetes mellitus without complication    Urge incontinence of urine    Acute exacerbation of chronic obstructive pulmonary disease (COPD)    Internal hemorrhoid, bleeding    Spinal stenosis     Past Medical History:   Diagnosis Date    Anxiety     Aortic valve stenosis     Cardiac murmur     Cataract, bilateral     CHF (congestive heart failure)     Cholelithiasis GB out    Chronic kidney disease     Stage III    Clostridium difficile infection     in her 30s    Colon polyp Ever since having colonoscopies.    Common variable immunodeficiency     IVIG infusions    Compression fracture of lumbar spine, non-traumatic     COPD (chronic obstructive pulmonary disease)     Coronary artery disease     Prinz metal angina & aortic stenosis    Depression     Diabetes mellitus     type II    Eosinophilic asthma     Fibromyalgia, primary     Full dentures     GERD (gastroesophageal reflux disease)     History of transfusion     in [facility] at [facility].  No reaction noted, patient states she does have an antibody from transfusion.    Hypertension     Hypogammaglobulinemia     Hypothyroidism     Irritable bowel syndrome     Migraines     Morbid obesity     Optic neuritis     Optic neuropathy     Osteoarthritis     Prinzmetal angina 1990    Pseudomonas infection 1998    lungs    PTSD (post-traumatic stress disorder)     Pulmonary edema     Renal insufficiency     Sinus tachycardia 1990    Sleep apnea     no longer uses/needs cpap    Stroke     TIA about 7 years ago    Tachycardia     TIA (transient ischemic attack) 2017    Trochanteric bursitis 01/25/2023     Past Surgical History:   Procedure Laterality Date    APPENDECTOMY      BUNIONECTOMY Bilateral     CARDIAC CATHETERIZATION  2017    no stents needed    CARPAL TUNNEL RELEASE Right     COLONOSCOPY  2021    ENDOMETRIAL  ABLATION  1997    EYE SURGERY  ? About 6-8 years ago    Laser for glaucoma    FRACTURE SURGERY  1995    No hardware; Tibeal plateau    GASTRIC BYPASS      HAND SURGERY Left     No hardware    HEMORRHOIDECTOMY N/A 04/09/2024    Procedure: HEMORRHOID BANDING X2;  Surgeon: Melissa Beck MD;  Location: [facility] OR;  Service: General;  Laterality: N/A;    INTERSTIM PLACEMENT N/A 05/03/2023    Procedure: INTERSTIM STAGES 1 AND 2 LEAD AND GENERATOR PLACEMENT;  Surgeon: Abner Nation MD;  Location: [facility] OR;  Service: Urology;  Laterality: N/A;    POSTERIOR VAGINAL REPAIR N/A 08/02/2023    Procedure: POSTERIOR COLPORRHAPHY;  Surgeon: Kellen Galan MD;  Location: [facility] OR;  Service: Gynecology;  Laterality: N/A;    RECTAL EXAMINATION UNDER ANESTHESIA N/A 04/09/2024    Procedure: RECTAL EXAM UNDER ANESTHESIA;  Surgeon: Melissa Beck MD;  Location: [facility] OR;  Service: General;  Laterality: N/A;    REPLACEMENT TOTAL KNEE BILATERAL      TEETH EXTRACTION      all of bottom teeth removed    THORACOTOMY      TONSILLECTOMY      TOTAL ABDOMINAL HYSTERECTOMY WITH SALPINGO OOPHORECTOMY      TRACHEAL SURGERY      Repaired via thoracotomy    TUBAL ABDOMINAL LIGATION  1983    VENOUS ACCESS DEVICE (PORT) INSERTION      port a cath in the left chest wall      General Information       Row Name 07/06/25 1551          Physical Therapy Time and Intention    Document Type therapy note (daily note)  -SC     Mode of Treatment physical therapy  -SC       Row Name 07/06/25 1551          General Information    Patient Profile Reviewed yes  -SC     Existing Precautions/Restrictions spinal;fall  -SC       Row Name 07/06/25 1551          Cognition    Orientation Status (Cognition) oriented x 4  -SC       Row Name 07/06/25 1551          Safety Issues/Impairments Affecting Functional Mobility    Impairments Affecting Function (Mobility) endurance/activity tolerance;pain;postural/trunk control;strength  -SC     Comment, Safety  Issues/Impairments (Mobility) alert, following commands  -SC               User Key  (r) = Recorded By, (t) = Taken By, (c) = Cosigned By      Initials Name Provider Type    SC Apolinar Butts PT Physical Therapist                   Mobility       Row Name 07/06/25 1552          Bed Mobility    Bed Mobility supine-sit;sit-supine;scooting/bridging  -SC     Scooting/Bridging Callahan (Bed Mobility) modified independence  -SC     Supine-Sit Callahan (Bed Mobility) modified independence;verbal cues  -SC     Sit-Supine Callahan (Bed Mobility) modified independence;verbal cues  -SC     Assistive Device (Bed Mobility) head of bed elevated;lift device  -SC     Comment, (Bed Mobility) able to get in/oob with leg   -SC       Row Name 07/06/25 1552          Transfers    Comment, (Transfers) STS from EOB with cuesf or hand placemnt. Encouraged patient to keep trunk in comfort zone  -SC       Row Name 07/06/25 1552          Sit-Stand Transfer    Sit-Stand Callahan (Transfers) standby assist;verbal cues  -SC     Assistive Device (Sit-Stand Transfers) walker, front-wheeled  -SC       Row Name 07/06/25 1552          Gait/Stairs (Locomotion)    Callahan Level (Gait) contact guard  -SC     Assistive Device (Gait) walker, front-wheeled  -SC     Distance in Feet (Gait) 24  -SC     Deviations/Abnormal Patterns (Gait) right sided deviations;stride length decreased;weight shifting decreased;gait speed decreased  -SC     Bilateral Gait Deviations forward flexed posture  -SC     Comment, (Gait/Stairs) Worked on controling walker with step through gait pattern.  R leg pain increasing with time requirig patient to sit and rest. Patiiet preformed sitting trunk flexion during rest perioid  -SC               User Key  (r) = Recorded By, (t) = Taken By, (c) = Cosigned By      Initials Name Provider Type    SC Apolinar Butts PT Physical Therapist                   Obj/Interventions       Row Name 07/06/25 5033           Motor Skills    Therapeutic Exercise hip  -[facility] Name 07/06/25 1556          Hip (Therapeutic Exercise)    Hip (Therapeutic Exercise) AROM (active range of motion)  -SC     Hip AROM (Therapeutic Exercise) supine;aBduction;aDduction;flexion;extension;other (see comments);20 repititions  2 set 10,  bent knee fallouts, knee to chest  -[facility] Name 07/06/25 1556          Balance    Balance Assessment standing dynamic balance  -SC     Dynamic Standing Balance contact guard;1-person assist  -SC     Position/Device Used, Standing Balance supported;walker, rolling  -SC     Comment, Balance no LOB  -SC               User Key  (r) = Recorded By, (t) = Taken By, (c) = Cosigned By      Initials Name Provider Type    SC Apolinar Butts, PT Physical Therapist                   Goals/Plan    No documentation.                  Clinical Impression       [facility] Name 07/06/25 1552          Pain    Pain Side/Orientation right;lower  -SC     Pain Management Interventions exercise or physical activity utilized  -SC     Response to Pain Interventions activity participation with tolerable pain  -SC     Additional Documentation Pain Scale: FACES Pre/Post-Treatment (Group)  -[facility] Name 07/06/25 1556          Pain Scale: FACES Pre/Post-Treatment    Pain: FACES Scale, Pretreatment 2-->hurts little bit  -SC     Posttreatment Pain Rating 6-->hurts even more  -[facility] Name 07/06/25 1557          Plan of Care Review    Plan of Care Reviewed With patient  -SC     Progress improving  -SC     Outcome Evaluation Patient with improving pain control. SHe was able to increase her ambulaton, however still limited by onset of R leg radicular pain. Recommed IPR at discharge  -[facility] Name 07/06/25 1552          Therapy Assessment/Plan (PT)    Patient/Family Therapy Goals Statement (PT) walk  -SC     Rehab Potential (PT) good  -SC     Criteria for Skilled Interventions Met (PT) yes;meets criteria  -SC     Therapy Frequency (PT)  daily  -SC       Row Name 07/06/25 1558          Positioning and Restraints    Pre-Treatment Position in bed  -SC     Post Treatment Position bed  -SC     In Bed supine;call light within reach;encouraged to call for assist;exit alarm on  -SC               User Key  (r) = Recorded By, (t) = Taken By, (c) = Cosigned By      Initials Name Provider Type    SC Apolinar Butts, PT Physical Therapist                   Outcome Measures       Row Name 07/06/25 1600 07/06/25 0800       How much help from another person do you currently need...    Turning from your back to your side while in flat bed without using bedrails? 4  -SC 4  -MD    Moving from lying on back to sitting on the side of a flat bed without bedrails? 3  -SC 3  -MD    Moving to and from a bed to a chair (including a wheelchair)? 3  -SC 3  -MD    Standing up from a chair using your arms (e.g., wheelchair, bedside chair)? 3  -SC 3  -MD    Climbing 3-5 steps with a railing? 2  -SC 3  -MD    To walk in hospital room? 3  -SC 3  -MD    AM-PAC 6 Clicks Score (PT) 18  -SC 19  -MD    Highest Level of Mobility Goal Walk 10 Steps or More-6  -SC Walk 10 Steps or More-6  -MD      Row Name 07/06/25 1600          Functional Assessment    Outcome Measure Options AM-PAC 6 Clicks Basic Mobility (PT)  -SC               User Key  (r) = Recorded By, (t) = Taken By, (c) = Cosigned By      Initials Name Provider Type    SC Apolinar Butts, PT Physical Therapist    Racquel Chapman, RN Registered Nurse                                 Physical Therapy Education       Title: PT OT SLP Therapies (In Progress)       Topic: Physical Therapy (Done)       Point: Mobility training (Done)       Learning Progress Summary            Patient SHALOM Queen,MARCO,D, SHON,DU by SC at 7/6/2025 1600    Comment: reviewed HEP    SHALOM Queen VU by SC at 7/5/2025 1419    Comment: re idwed HEP                      Point: Home exercise program (Done)       Learning Progress Summary            Patient Bernice  E,TB,D, VU,DU by SC at 7/6/2025 1600    Comment: reviewed HEP    Bernice, E, VU by SC at 7/5/2025 1419    Comment: re idwed HEP                      Point: Body mechanics (Done)       Learning Progress Summary            Patient SHALOM Queen,TB,D, VU,DU by SC at 7/6/2025 1600    Comment: reviewed HEP    Bernice E, VU by SC at 7/5/2025 1419    Comment: re idwed HEP                      Point: Precautions (Done)       Learning Progress Summary            Patient SHALOM Queen,TB,D, VU,DU by SC at 7/6/2025 1600    Comment: reviewed HEP    Bernice E, VU by SC at 7/5/2025 1419    Comment: re idwed HEP                                      User Key       Initials Effective Dates Name Provider Type Discipline    SC 02/03/23 -  Apolinar Butts, PT Physical Therapist PT                  PT Recommendation and Plan  Planned Therapy Interventions (PT): balance training, bed mobility training, gait training, home exercise program, ROM (range of motion), patient/family education, strengthening, stretching, transfer training  Progress: improving  Outcome Evaluation: Patient with improving pain control. SHe was able to increase her ambulaton, however still limited by onset of R leg radicular pain. Recommed IPR at discharge     Time Calculation:   PT Evaluation Complexity  History, PT Evaluation Complexity: 3 or more personal factors and/or comorbidities  Examination of Body Systems (PT Eval Complexity): total of 3 or more elements  Clinical Presentation (PT Evaluation Complexity): evolving  Clinical Decision Making (PT Evaluation Complexity): moderate complexity  Overall Complexity (PT Evaluation Complexity): moderate complexity     PT Charges       Row Name 07/06/25 1455             Time Calculation    Start Time 1455  -SC      PT Received On 07/06/25  -SC      PT Goal Re-Cert Due Date 07/15/25  -SC         Timed Charges    80489 - PT Therapeutic Exercise Minutes 10  -SC      82656 - Gait Training Minutes  9  -SC      16091 - PT Therapeutic  Activity Minutes 5  -SC         Total Minutes    Timed Charges Total Minutes 24  -SC       Total Minutes 24  -SC                User Key  (r) = Recorded By, (t) = Taken By, (c) = Cosigned By      Initials Name Provider Type    SC Apolinar Butts PT Physical Therapist                  Therapy Charges for Today       Code Description Service Date Service Provider Modifiers Qty    64624674184 HC PT THER PROC EA 15 MIN 2025 Apolinar Butts, PT GP 1    44580787116 HC GAIT TRAINING EA 15 MIN 2025 Apolinar Butts, PT GP 1    44776776907 HC PT EVAL MOD COMPLEXITY 3 2025 Benjamín, Apolinar DELAROSA, PT GP 1    90806331604 HC PT THER PROC EA 15 MIN 2025 Apolinar Butts, PT GP 1    48942156974 HC GAIT TRAINING EA 15 MIN 2025 Apolinar Butts, PT GP 1            PT G-Codes  Outcome Measure Options: AM-PAC 6 Clicks Basic Mobility (PT)  AM-PAC 6 Clicks Score (PT): 18  AM-PAC 6 Clicks Score (OT): 16  PT Discharge Summary  Anticipated Discharge Disposition (PT): inpatient rehabilitation facility    Apolinar Butts PT  2025      Electronically signed by Apolinar Butts PT at 25 1602          Occupational Therapy Notes (most recent note)        Nehemiah Platt, OT at 25 1543          Patient Name: Raquel Mariee  : 1955    MRN: [id number]                              Today's Date: 2025       Admit Date: 2025    Visit Dx: No diagnosis found.  Patient Active Problem List   Diagnosis    Hypogammaglobulinemia    Gastric bypass status for obesity    CKD (chronic kidney disease), stage III    KALYN (obstructive sleep apnea)    Major depressive disorder in partial remission    Hypothyroidism    Fibromyalgia syndrome    Abdominal aortic aneurysm (AAA) without rupture    Nonrheumatic aortic valve stenosis    Vitamin D deficiency, unspecified     Anatomical narrow angle borderline glaucoma    Abnormal finding of blood chemistry, unspecified     Chronic diastolic heart failure    Prinzmetal angina     Common variable agammaglobulinemia    Polymyositis    Port-A-Cath in place    Abnormal CT of the chest    Acute bronchospasm    Acute kidney injury    Sinusitis, chronic    Anemia    Aortic valve stenosis and insufficiency, rheumatic    Body mass index (BMI) of 40.0 to 44.9 in adult    Calcium kidney stones    Choroidal nevus of left eye    CAD (coronary artery disease)    Claw toe, acquired    Combined forms of age-related cataract of both eyes    Depressive disorder    Essential hypertension with goal blood pressure less than 140/90    Severe persistent asthma with exacerbation    Glaucoma suspect of both eyes    Inappropriate sinus tachycardia    Migraine without aura    Mitral valve regurgitation    Osteoarthrosis involving lower leg    Pain in joint involving lower leg    Posterior vitreous detachment of both eyes    Primary osteoarthritis of left wrist    Anxiety disorder    Status post total knee replacement    Sleep disturbances    Senile nuclear sclerosis    Rotator cuff syndrome of left shoulder    Pure hypercholesterolemia    Thoracic aortic aneurysm without rupture    TIA (transient ischemic attack)    Tracheomalacia    Trochlear nerve palsy, left    Diabetes mellitus without complication    Urge incontinence of urine    Acute exacerbation of chronic obstructive pulmonary disease (COPD)    Internal hemorrhoid, bleeding    Spinal stenosis     Past Medical History:   Diagnosis Date    Anxiety     Aortic valve stenosis     Cardiac murmur     Cataract, bilateral     CHF (congestive heart failure)     Cholelithiasis GB out    Chronic kidney disease     Stage III    Clostridium difficile infection     in her 30s    Colon polyp Ever since having colonoscopies.    Common variable immunodeficiency     IVIG infusions    Compression fracture of lumbar spine, non-traumatic     COPD (chronic obstructive pulmonary disease)     Coronary artery disease     Prinz metal angina & aortic stenosis    Depression      Diabetes mellitus     type II    Eosinophilic asthma     Fibromyalgia, primary     Full dentures     GERD (gastroesophageal reflux disease)     History of transfusion     in [facility] at [facility].  No reaction noted, patient states she does have an antibody from transfusion.    Hypertension     Hypogammaglobulinemia     Hypothyroidism     Irritable bowel syndrome     Migraines     Morbid obesity     Optic neuritis     Optic neuropathy     Osteoarthritis     Prinzmetal angina 1990    Pseudomonas infection 1998    lungs    PTSD (post-traumatic stress disorder)     Pulmonary edema     Renal insufficiency     Sinus tachycardia 1990    Sleep apnea     no longer uses/needs cpap    Stroke     TIA about 7 years ago    Tachycardia     TIA (transient ischemic attack) 2017    Trochanteric bursitis 01/25/2023     Past Surgical History:   Procedure Laterality Date    APPENDECTOMY      BUNIONECTOMY Bilateral     CARDIAC CATHETERIZATION  2017    no stents needed    CARPAL TUNNEL RELEASE Right     COLONOSCOPY  2021    ENDOMETRIAL ABLATION  1997    EYE SURGERY  ? About 6-8 years ago    Laser for glaucoma    FRACTURE SURGERY  1995    No hardware; Tibeal plateau    GASTRIC BYPASS      HAND SURGERY Left     No hardware    HEMORRHOIDECTOMY N/A 04/09/2024    Procedure: HEMORRHOID BANDING X2;  Surgeon: Melissa Beck MD;  Location:  [facility];  Service: General;  Laterality: N/A;    INTERSTIM PLACEMENT N/A 05/03/2023    Procedure: INTERSTIM STAGES 1 AND 2 LEAD AND GENERATOR PLACEMENT;  Surgeon: Abner Nation MD;  Location: [facility] OR;  Service: Urology;  Laterality: N/A;    POSTERIOR VAGINAL REPAIR N/A 08/02/2023    Procedure: POSTERIOR COLPORRHAPHY;  Surgeon: Kellen Galan MD;  Location:  [facility];  Service: Gynecology;  Laterality: N/A;    RECTAL EXAMINATION UNDER ANESTHESIA N/A 04/09/2024    Procedure: RECTAL EXAM UNDER ANESTHESIA;  Surgeon: Melissa Beck MD;  Location:  [facility];  Service: General;   Laterality: N/A;    REPLACEMENT TOTAL KNEE BILATERAL      TEETH EXTRACTION      all of bottom teeth removed    THORACOTOMY      TONSILLECTOMY      TOTAL ABDOMINAL HYSTERECTOMY WITH SALPINGO OOPHORECTOMY      TRACHEAL SURGERY      Repaired via thoracotomy    TUBAL ABDOMINAL LIGATION  1983    VENOUS ACCESS DEVICE (PORT) INSERTION      port a cath in the left chest wall      General Information       Row Name 07/05/25 1606          OT Time and Intention    Document Type evaluation  -SA     Mode of Treatment occupational therapy  -       Row Name 07/05/25 1606          General Information    Patient Profile Reviewed yes  -SA     Prior Level of Function independent:;all household mobility;community mobility;gait;transfer;bed mobility;min assist:;bathing;dressing  Pt reports independence with all mobility with use of Hurrycane last ~5 mo; occasional assist with LBD and bathing, otherwise independent with ADLs; denies history of falls  -SA     Existing Precautions/Restrictions spinal;fall  -     Barriers to Rehab medically complex;previous functional deficit  -SA       Row Name 07/05/25 1606          Occupational Profile    Occupational History/Life Experiences (Occupational Profile) 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, standard walker; W[employer]  -       Row Name 07/05/25 1606          Living Environment    Current Living Arrangements home  -SA     People in Home spouse  -SA       Row Name 07/05/25 1606          Home Main Entrance    Number of Stairs, Main Entrance none  -SA       Row Name 07/05/25 1606          Stairs Within Home, Primary    Number of Stairs, Within Home, Primary none  -       Row Name 07/05/25 1606          Cognition    Orientation Status (Cognition) oriented x 4  -SA       Row Name 07/05/25 1606          Safety Issues/Impairments Affecting Functional Mobility    Safety Issues Affecting Function (Mobility) safety precaution awareness  -SA     Impairments Affecting Function (Mobility) endurance/activity  tolerance;pain;postural/trunk control;strength  -     Comment, Safety Issues/Impairments (Mobility) Alert, following commands  -               User Key  (r) = Recorded By, (t) = Taken By, (c) = Cosigned By      Initials Name Provider Type     Nehemiah Platt OT Occupational Therapist                     Mobility/ADL's       Row Name 07/05/25 1608          Bed Mobility    Bed Mobility supine-sit;sit-supine  -     Supine-Sit [place] (Bed Mobility) modified independence  -     Sit-Supine [place] (Bed Mobility) modified independence  -     Bed Mobility, Safety Issues impaired trunk control for bed mobility  -     Assistive Device (Bed Mobility) head of bed elevated;bed rails  -     Comment, (Bed Mobility) Increased time and effort secondary to pain  -       Row Name 07/05/25 1608          Transfers    Transfers sit-stand transfer;stand-sit transfer  -     Comment, (Transfers) VC for hand placement  -       Row Name 07/05/25 1608          Sit-Stand Transfer    Sit-Stand [place] (Transfers) standby assist;verbal cues  -     Assistive Device (Sit-Stand Transfers) walker, front-wheeled  -     Comment, (Sit-Stand Transfer) VC for hand placement  -       Row Name 07/05/25 1608          Stand-Sit Transfer    Stand-Sit [place] (Transfers) standby assist;verbal cues  -     Assistive Device (Stand-Sit Transfers) walker, front-wheeled  -     Comment, (Stand-Sit Transfer) VC to reach back for bed rail  -       Row Name 07/05/25 1608          Functional Mobility    Functional Mobility- Ind. Level standby assist  -     Functional Mobility- Device walker, front-wheeled  -     Functional Mobility-Distance (Feet) 2  -     Functional Mobility- Comment Pt ambulated 2ft forward and 2ft backwards with RWx and SBA, further mobility limited by RLE pain  -     Patient was able to Ambulate yes  -       Row Name 07/05/25 1608          Activities of Daily Living    BADL  Assessment/Intervention bathing;lower body dressing;toileting  -SA       Row Name 07/05/25 1608          Bathing Assessment/Intervention    Assistive Devices (Bathing) long-handled sponge  -     Comment, (Bathing) Pt provided and educated on long handled sponge to use when bathing as needed, pt verbalized her understanding  -SA       Row Name 07/05/25 1608          Lower Body Dressing Assessment/Training    [place] Level (Lower Body Dressing) don;doff;socks;maximum assist (25% patient effort)  -     Assistive Devices (Lower Body Dressing) long-handled shoe horn;reacher;sock-aid  -     Position (Lower Body Dressing) supine  -     Comment, (Lower Body Dressing) Pt provided and educated on AE for LBD, pt verbalized her understanding, however, would benefit from practice prior to discharge  -SA       Row Name 07/05/25 1608          Toileting Assessment/Training    Assistive Devices (Toileting) toilet paper aid  -     Comment, (Toileting) Pt provided and educated on toilet paper aid, pt verbalized her understanding  -               User Key  (r) = Recorded By, (t) = Taken By, (c) = Cosigned By      Initials Name Provider Type    SA Nehemiah Platt OT Occupational Therapist                   Obj/Interventions       [facility] Name 07/05/25 1612          Sensory Assessment (Somatosensory)    Sensory Assessment (Somatosensory) UE sensation intact  -SA       Row Name 07/05/25 1612          Vision Assessment/Intervention    Visual Impairment/Limitations WFL  -SA       Row Name 07/05/25 1612          Range of Motion Comprehensive    General Range of Motion no range of motion deficits identified  -SA       Row Name 07/05/25 1612          Strength Comprehensive (MMT)    Comment, General Manual Muscle Testing (MMT) Assessment BUE grossly 4/5 throghout  -SA       Row Name 07/05/25 1612          Balance    Balance Assessment sitting static balance;sitting dynamic balance;sit to stand dynamic balance;standing static  balance;standing dynamic balance  -SA     Static Sitting Balance independent  -SA     Dynamic Sitting Balance standby assist  -SA     Position, Sitting Balance unsupported;sitting edge of bed  -SA     Static Standing Balance standby assist  -SA     Dynamic Standing Balance standby assist  -SA     Position/Device Used, Standing Balance supported;walker, front-wheeled  -SA     Balance Interventions sitting;standing;sit to stand;supported;static;dynamic  -SA     Comment, Balance No LOB  -SA               User Key  (r) = Recorded By, (t) = Taken By, (c) = Cosigned By      Initials Name Provider Type    SA Nehemiah Platt, OT Occupational Therapist                   Goals/Plan       Row Name 07/05/25 1616          Transfer Goal 1 (OT)    Activity/Assistive Device (Transfer Goal 1, OT) bed-to-chair/chair-to-bed  -SA     [place] Level/Cues Needed (Transfer Goal 1, OT) standby assist  -SA     Time Frame (Transfer Goal 1, OT) long term goal (LTG);10 days  -SA     Progress/Outcome (Transfer Goal 1, OT) new goal  -SA       Row Name 07/05/25 1616          Bathing Goal 1 (OT)    Activity/Device (Bathing Goal 1, OT) bathing skills, all  -SA     [place] Level/Cues Needed (Bathing Goal 1, OT) set-up required  -SA     Time Frame (Bathing Goal 1, OT) long term goal (LTG);10 days  -SA     Progress/Outcomes (Bathing Goal 1, OT) new goal  -       Row Name 07/05/25 1616          Dressing Goal 1 (OT)    Activity/Device (Dressing Goal 1, OT) lower body dressing  -SA     [place]/Cues Needed (Dressing Goal 1, OT) set-up required  -SA     Time Frame (Dressing Goal 1, OT) short term goal (STG);5 days  -SA     Progress/Outcome (Dressing Goal 1, OT) new goal  -SA       Row Name 07/05/25 1616          Grooming Goal 1 (OT)    Activity/Device (Grooming Goal 1, OT) grooming skills, all  -SA     [place] (Grooming Goal 1, OT) standby assist;other (see comments)  [place]  -SA     Time Frame (Grooming Goal 1, OT) long term goal  (LTG);10 days  -     Progress/Outcome (Grooming Goal 1, OT) new goal  -SA       Row Name 07/05/25 1616          Therapy Assessment/Plan (OT)    Planned Therapy Interventions (OT) activity tolerance training;adaptive equipment training;BADL retraining;occupation/activity based interventions;patient/caregiver education/training;ROM/therapeutic exercise;strengthening exercise;transfer/mobility retraining  -               User Key  (r) = Recorded By, (t) = Taken By, (c) = Cosigned By      Initials Name Provider Type    SA Nehemiah Platt, SKYLAR Occupational Therapist                   Clinical Impression       Row Name 07/05/25 1613          Pain Assessment    Pretreatment Pain Rating 2/10  -SA     Posttreatment Pain Rating 7/10  -SA     Pain Location extremity  -SA     Pain Side/Orientation lower;right  -SA     Pain Management Interventions activity modification encouraged;exercise or physical activity utilized;positioning techniques utilized  -     Response to Pain Interventions activity participation with tolerable pain  -SA       Row Name 07/05/25 1613          Plan of Care Review    Plan of Care Reviewed With patient  -SA     Progress no change  -SA     Outcome Evaluation OT evaluation complete. Pt presents with weakness, acute pain, decreased activity tolerance, and decreased independence with ADLs. Pt performed bed mobility with Mod I and was able to stand with RWx and SBA, however, unable to tolerate additional activity secondary to RLE pain. Pt required Max A to don socks secondary to pain. Pt provided and educated on AE to use as needed to help promote pain relief with ADLs. Pt would benefit from skilled IPOT services to improve fxnl status. OT recommends IRF at discharge.  -       Row Name 07/05/25 1613          Therapy Assessment/Plan (OT)    Rehab Potential (OT) good  -SA     Criteria for Skilled Therapeutic Interventions Met (OT) yes;meets criteria;skilled treatment is necessary  -     Therapy  Frequency (OT) daily  -SA     Predicted Duration of Therapy Intervention (OT) 10 days  -SA       Row Name 07/05/25 1613          Therapy Plan Review/Discharge Plan (OT)    Anticipated Discharge Disposition (OT) inpatient rehabilitation facility  -       Row Name 07/05/25 1613          Vital Signs    Pre Systolic BP Rehab 164  -SA     Pre Treatment Diastolic BP 91  -SA     Post Systolic BP Rehab 174  -SA     Post Treatment Diastolic BP 94  -SA     Pretreatment Heart Rate (beats/min) 81  -SA     Posttreatment Heart Rate (beats/min) 83  -SA     Pre SpO2 (%) 96  -SA     O2 Delivery Pre Treatment room air  -SA     Post SpO2 (%) 96  -SA     O2 Delivery Post Treatment room air  -SA     Pre Patient Position Supine  -SA     Post Patient Position Supine  -SA       Row Name 07/05/25 1613          Positioning and Restraints    Pre-Treatment Position in bed  -SA     Post Treatment Position bed  -SA     In Bed notified nsg;supine;fowlers;call light within reach;encouraged to call for assist;exit alarm on;side rails up x2  -SA               User Key  (r) = Recorded By, (t) = Taken By, (c) = Cosigned By      Initials Name Provider Type    SA Nehemiah Platt, OT Occupational Therapist                   Outcome Measures       Row Name 07/05/25 1617          How much help from another is currently needed...    Putting on and taking off regular lower body clothing? 2  -SA     Bathing (including washing, rinsing, and drying) 2  -SA     Toileting (which includes using toilet bed pan or urinal) 2  -SA     Putting on and taking off regular upper body clothing 3  -SA     Taking care of personal grooming (such as brushing teeth) 3  -SA     Eating meals 4  -SA     AM-PAC 6 Clicks Score (OT) 16  -SA       Row Name 07/05/25 1418          How much help from another person do you currently need...    Turning from your back to your side while in flat bed without using bedrails? 4  -SC     Moving from lying on back to sitting on the side of a  flat bed without bedrails? 3  -SC     Moving to and from a bed to a chair (including a wheelchair)? 3  -SC     Standing up from a chair using your arms (e.g., wheelchair, bedside chair)? 3  -SC     To walk in hospital room? 2  -SC       Row Name 07/05/25 1617 07/05/25 1418       Functional Assessment    Outcome Measure Options AM-PAC 6 Clicks Daily Activity (OT)  - AM-PAC 6 Clicks Basic Mobility (PT)  -SC              User Key  (r) = Recorded By, (t) = Taken By, (c) = Cosigned By      Initials Name Provider Type    SC Apolinar Butts, PT Physical Therapist    Nehemiah Jewell, OT Occupational Therapist                    Occupational Therapy Education       Title: PT OT SLP Therapies (In Progress)       Topic: Occupational Therapy (In Progress)       Point: ADL training (In Progress)       Learning Progress Summary            Patient Acceptance, E,D, NR by  at 7/5/2025 1618                      Point: Precautions (In Progress)       Learning Progress Summary            Patient Acceptance, E,D, NR by  at 7/5/2025 1618                      Point: Body mechanics (In Progress)       Learning Progress Summary            Patient Acceptance, E,D, NR by  at 7/5/2025 1618                                      User Key       Initials Effective Dates Name Provider Type Discipline     04/16/25 -  Nehemiah Platt, SKYLAR Occupational Therapist OT                  OT Recommendation and Plan  Planned Therapy Interventions (OT): activity tolerance training, adaptive equipment training, BADL retraining, occupation/activity based interventions, patient/caregiver education/training, ROM/therapeutic exercise, strengthening exercise, transfer/mobility retraining  Therapy Frequency (OT): daily  Plan of Care Review  Plan of Care Reviewed With: patient  Progress: no change  Outcome Evaluation: OT evaluation complete. Pt presents with weakness, acute pain, decreased activity tolerance, and decreased independence with ADLs. Pt  performed bed mobility with Mod I and was able to stand with RWx and SBA, however, unable to tolerate additional activity secondary to RLE pain. Pt required Max A to don socks secondary to pain. Pt provided and educated on AE to use as needed to help promote pain relief with ADLs. Pt would benefit from skilled IPOT services to improve fxnl status. OT recommends IRF at discharge.     Time Calculation:   Evaluation Complexity (OT)  Review Occupational Profile/Medical/Therapy History Complexity: expanded/moderate complexity  Assessment, Occupational Performance/Identification of Deficit Complexity: 3-5 performance deficits  Clinical Decision Making Complexity (OT): detailed assessment/moderate complexity  Overall Complexity of Evaluation (OT): moderate complexity     Time Calculation- OT       Row Name 07/05/25 1618 07/05/25 1312          Time Calculation- OT    OT Start Time 1543  -SA --     OT Received On 07/05/25 -SA --     OT Goal Re-Cert Due Date 07/15/25  -SA --        Timed Charges    31622 - Gait Training Minutes  -- 5  -SC     24631 - OT Therapeutic Activity Minutes 10  -SA --     70280 - OT Self Care/Mgmt Minutes 15  -SA --        Untimed Charges    OT Eval/Re-eval Minutes 31  -SA --        Total Minutes    Timed Charges Total Minutes 25  -SA 5  -SC     Untimed Charges Total Minutes 31  -SA --      Total Minutes 56  -SA 5  -SC               User Key  (r) = Recorded By, (t) = Taken By, (c) = Cosigned By      Initials Name Provider Type    SC Apolinar Butts, PT Physical Therapist    SA Nehemiah Platt OT Occupational Therapist                  Therapy Charges for Today       Code Description Service Date Service Provider Modifiers Qty    88799153099 HC OT THERAPEUTIC ACT EA 15 MIN 7/5/2025 Nehemiah Platt OT GO 1    84572747735 HC OT SELF CARE/MGMT/TRAIN EA 15 MIN 7/5/2025 Nehemiah Platt OT GO 1    73671801647 HC OT EVAL MOD COMPLEXITY 3 7/5/2025 Nehemiah Platt OT GO 1                  Nehemiah Platt, OT  7/5/2025    Electronically signed by Nehemiah Platt, OT at 07/05/25 6153

## 2025-07-07 NOTE — PLAN OF CARE
Goal Outcome Evaluation:  Plan of Care Reviewed With: patient        Progress: improving  Outcome Evaluation: Decided to go to rehab, CM will made aware to process referral (pt prefers to go to [facility] since it's close to pt's resident)

## 2025-07-07 NOTE — THERAPY TREATMENT NOTE
Patient Name: Raquel Mariee  : 1955    MRN: [id number]                              Today's Date: 2025       Admit Date: 2025    Visit Dx: No diagnosis found.  Patient Active Problem List   Diagnosis    Hypogammaglobulinemia    Gastric bypass status for obesity    CKD (chronic kidney disease), stage III    KALYN (obstructive sleep apnea)    Major depressive disorder in partial remission    Hypothyroidism    Fibromyalgia syndrome    Abdominal aortic aneurysm (AAA) without rupture    Nonrheumatic aortic valve stenosis    Vitamin D deficiency, unspecified     Anatomical narrow angle borderline glaucoma    Abnormal finding of blood chemistry, unspecified     Chronic diastolic heart failure    Prinzmetal angina    Common variable agammaglobulinemia    Polymyositis    Port-A-Cath in place    Abnormal CT of the chest    Acute bronchospasm    Acute kidney injury    Sinusitis, chronic    Anemia    Aortic valve stenosis and insufficiency, rheumatic    Body mass index (BMI) of 40.0 to 44.9 in adult    Calcium kidney stones    Choroidal nevus of left eye    CAD (coronary artery disease)    Claw toe, acquired    Combined forms of age-related cataract of both eyes    Depressive disorder    Essential hypertension with goal blood pressure less than 140/90    Severe persistent asthma with exacerbation    Glaucoma suspect of both eyes    Inappropriate sinus tachycardia    Migraine without aura    Mitral valve regurgitation    Osteoarthrosis involving lower leg    Pain in joint involving lower leg    Posterior vitreous detachment of both eyes    Primary osteoarthritis of left wrist    Anxiety disorder    Status post total knee replacement    Sleep disturbances    Senile nuclear sclerosis    Rotator cuff syndrome of left shoulder    Pure hypercholesterolemia    Thoracic aortic aneurysm without rupture    TIA (transient ischemic attack)    Tracheomalacia    Trochlear nerve palsy, left    Diabetes mellitus without  complication    Urge incontinence of urine    Acute exacerbation of chronic obstructive pulmonary disease (COPD)    Internal hemorrhoid, bleeding    Spinal stenosis    Bilateral sacroiliitis     Past Medical History:   Diagnosis Date    Anxiety     Aortic valve stenosis     Cardiac murmur     Cataract, bilateral     CHF (congestive heart failure)     Cholelithiasis GB out    Chronic kidney disease     Stage III    Clostridium difficile infection     in her 30s    Colon polyp Ever since having colonoscopies.    Common variable immunodeficiency     IVIG infusions    Compression fracture of lumbar spine, non-traumatic     COPD (chronic obstructive pulmonary disease)     Coronary artery disease     Prinz metal angina & aortic stenosis    Depression     Diabetes mellitus     type II    Eosinophilic asthma     Fibromyalgia, primary     Full dentures     GERD (gastroesophageal reflux disease)     History of transfusion     in 1979 at [facility].  No reaction noted, patient states she does have an antibody from transfusion.    Hypertension     Hypogammaglobulinemia     Hypothyroidism     Irritable bowel syndrome     Migraines     Morbid obesity     Optic neuritis     Optic neuropathy     Osteoarthritis     Prinzmetal angina 1990    Pseudomonas infection 1998    lungs    PTSD (post-traumatic stress disorder)     Pulmonary edema     Renal insufficiency     Sinus tachycardia 1990    Sleep apnea     no longer uses/needs cpap    Stroke     TIA about 7 years ago    Tachycardia     TIA (transient ischemic attack) 2017    Trochanteric bursitis 01/25/2023     Past Surgical History:   Procedure Laterality Date    APPENDECTOMY      BUNIONECTOMY Bilateral     CARDIAC CATHETERIZATION  2017    no stents needed    CARPAL TUNNEL RELEASE Right     COLONOSCOPY  2021    ENDOMETRIAL ABLATION  1997    EYE SURGERY  ? About 6-8 years ago    Laser for glaucoma    FRACTURE SURGERY  1995    No hardware; Tibeal plateau    GASTRIC BYPASS       HAND SURGERY Left     No hardware    HEMORRHOIDECTOMY N/A 04/09/2024    Procedure: HEMORRHOID BANDING X2;  Surgeon: Melissa Beck MD;  Location: [facility] OR;  Service: General;  Laterality: N/A;    INTERSTIM PLACEMENT N/A 05/03/2023    Procedure: INTERSTIM STAGES 1 AND 2 LEAD AND GENERATOR PLACEMENT;  Surgeon: Abner Nation MD;  Location: [facility] OR;  Service: Urology;  Laterality: N/A;    POSTERIOR VAGINAL REPAIR N/A 08/02/2023    Procedure: POSTERIOR COLPORRHAPHY;  Surgeon: Kellen Galan MD;  Location: [facility] OR;  Service: Gynecology;  Laterality: N/A;    RECTAL EXAMINATION UNDER ANESTHESIA N/A 04/09/2024    Procedure: RECTAL EXAM UNDER ANESTHESIA;  Surgeon: Melissa Beck MD;  Location: [facility] OR;  Service: General;  Laterality: N/A;    REPLACEMENT TOTAL KNEE BILATERAL      TEETH EXTRACTION      all of bottom teeth removed    THORACOTOMY      TONSILLECTOMY      TOTAL ABDOMINAL HYSTERECTOMY WITH SALPINGO OOPHORECTOMY      TRACHEAL SURGERY      Repaired via thoracotomy    TUBAL ABDOMINAL LIGATION  1983    VENOUS ACCESS DEVICE (PORT) INSERTION      port a cath in the left chest wall      General Information       Row Name 07/07/25 1546          Physical Therapy Time and Intention    Document Type therapy note (daily note)  -AB     Mode of Treatment physical therapy  -AB       Row Name 07/07/25 1546          General Information    Patient Profile Reviewed yes  -AB     Existing Precautions/Restrictions spinal;fall;other (see comments)  RLE pain  -AB     Barriers to Rehab medically complex;previous functional deficit  -AB       Row Name 07/07/25 1546          Cognition    Orientation Status (Cognition) oriented x 4  -AB       Row Name 07/07/25 1546          Safety Issues/Impairments Affecting Functional Mobility    Safety Issues Affecting Function (Mobility) insight into deficits/self-awareness;safety precaution awareness  -AB     Impairments Affecting Function (Mobility) endurance/activity  tolerance;pain;postural/trunk control;strength;balance  -AB               User Key  (r) = Recorded By, (t) = Taken By, (c) = Cosigned By      Initials Name Provider Type    Kelli White PT Physical Therapist                   Mobility       Row Name 07/07/25 1546          Bed Mobility    Comment, (Bed Mobility) received sitting EOB and left UIC  -AB       Row Name 07/07/25 1546          Transfers    Comment, (Transfers) Cues for hand placement and sequencing.  -AB       Row Name 07/07/25 1546          Sit-Stand Transfer    Sit-Stand [place] (Transfers) verbal cues;contact guard;1 person assist  -AB     Assistive Device (Sit-Stand Transfers) walker, front-wheeled  -AB     Comment, (Sit-Stand Transfer) Cues to step out RLE prior to sit/stand for comfort.  -AB       Row Name 07/07/25 1546          Gait/Stairs (Locomotion)    [place] Level (Gait) contact guard;1 person assist;verbal cues  -AB     Assistive Device (Gait) walker, front-wheeled  -AB     Patient was able to Ambulate yes  -AB     Distance in Feet (Gait) 20  +20  -AB     Deviations/Abnormal Patterns (Gait) right sided deviations;stride length decreased;weight shifting decreased;gait speed decreased;antalgic  -AB     Bilateral Gait Deviations forward flexed posture  -AB     Right Sided Gait Deviations weight shift ability decreased  -AB     Comment, (Gait/Stairs) Pt ambulated with step through gait pattern at a slowed pace and demonstrated decreased weight acceptance onto RLE. Cues provided for walker positioning. No overt LOB or knee buckling. One seated rest break taken. Further activity limited by RLE pain.  -AB               User Key  (r) = Recorded By, (t) = Taken By, (c) = Cosigned By      Initials Name Provider Type    Kelli White PT Physical Therapist                   Obj/Interventions       Row Name 07/07/25 1549          Motor Skills    Therapeutic Exercise knee;hip;ankle  -AB       Row Name 07/07/25 1549          Hip  (Therapeutic Exercise)    Hip (Therapeutic Exercise) isometric exercises  -AB     Hip Isometrics (Therapeutic Exercise) bilateral;gluteal sets;10 repetitions  -AB       Row Name 07/07/25 1549          Knee (Therapeutic Exercise)    Knee (Therapeutic Exercise) isometric exercises;strengthening exercise  -AB     Knee Isometrics (Therapeutic Exercise) bilateral;quad sets;10 repetitions  -AB     Knee Strengthening (Therapeutic Exercise) bilateral;LAQ (long arc quad);10 repetitions  -AB       Row Name 07/07/25 1549          Ankle (Therapeutic Exercise)    Ankle (Therapeutic Exercise) AROM (active range of motion)  -AB     Ankle AROM (Therapeutic Exercise) bilateral;dorsiflexion;plantarflexion;10 repetitions  -AB       Row Name 07/07/25 1549          Balance    Balance Assessment sitting static balance;sitting dynamic balance;standing dynamic balance;standing static balance  -AB     Static Sitting Balance standby assist  -AB     Dynamic Sitting Balance standby assist  -AB     Position, Sitting Balance unsupported;sitting edge of bed  -AB     Static Standing Balance contact guard  -AB     Dynamic Standing Balance contact guard;1-person assist;verbal cues  -AB     Position/Device Used, Standing Balance supported;walker, front-wheeled  -AB     Balance Interventions sitting;standing;sit to stand;supported;static;dynamic;occupation based/functional task  -AB     Comment, Balance No overt LOB.  -AB               User Key  (r) = Recorded By, (t) = Taken By, (c) = Cosigned By      Initials Name Provider Type    AB Kelli Mar, PT Physical Therapist                   Goals/Plan    No documentation.                  Clinical Impression       Row Name 07/07/25 1550          Pain    Pretreatment Pain Rating 3/10  -AB     Posttreatment Pain Rating 4/10  -AB     Pain Location extremity  -AB     Pain Side/Orientation right;lower  -AB     Pain Management Interventions activity modification encouraged;exercise or physical activity  utilized;positioning techniques utilized  -AB     Response to Pain Interventions activity participation with tolerable pain  -AB     Pre/Posttreatment Pain Comment 9/10 pain during mobility  -AB       Row Name 07/07/25 1550          Plan of Care Review    Plan of Care Reviewed With patient  -AB     Progress improving  -AB     Outcome Evaluation Pt with good effort and increased ambulation distance to 20'+20' with CGA and RW. She continues to present below baseline with RLE pain, weakness, and impaired endurance. Further IPPT is warrented. PT will progress as able per POC.  -AB       Row Name 07/07/25 1550          Vital Signs    Pre Systolic BP Rehab 142  -AB     Pre Treatment Diastolic BP 87  -AB     O2 Delivery Pre Treatment room air  -AB     O2 Delivery Intra Treatment room air  -AB     O2 Delivery Post Treatment room air  -AB     Pre Patient Position Sitting  -AB     Intra Patient Position Standing  -AB     Post Patient Position Sitting  -AB       Row Name 07/07/25 1550          Positioning and Restraints    Pre-Treatment Position in bed  -AB     Post Treatment Position chair  -AB     In Chair notified nsg;reclined;sitting;call light within reach;encouraged to call for assist;exit alarm on;legs elevated;waffle cushion;heels elevated  -AB               User Key  (r) = Recorded By, (t) = Taken By, (c) = Cosigned By      Initials Name Provider Type    AB Kelli Mar, PT Physical Therapist                   Outcome Measures       Row Name 07/07/25 1552 07/07/25 0925       How much help from another person do you currently need...    Turning from your back to your side while in flat bed without using bedrails? 3  -AB 4  -MK    Moving from lying on back to sitting on the side of a flat bed without bedrails? 3  -AB 3  -MK    Moving to and from a bed to a chair (including a wheelchair)? 3  -AB 3  -MK    Standing up from a chair using your arms (e.g., wheelchair, bedside chair)? 3  -AB 3  -MK    Climbing 3-5 steps  with a railing? 2  -AB 2  -MK    To walk in hospital room? 3  -AB 3  -MK    AM-PAC 6 Clicks Score (PT) 17  -AB 18  -MK    Highest Level of Mobility Goal Stand (1 or More Minutes)-5  -AB Walk 10 Steps or More-6  -MK      Row Name 07/07/25 1552          Functional Assessment    Outcome Measure Options AM-PAC 6 Clicks Basic Mobility (PT)  -AB               User Key  (r) = Recorded By, (t) = Taken By, (c) = Cosigned By      Initials Name Provider Type     Dulce Reynoso, RN Registered Nurse    Kelli White, PT Physical Therapist                                 Physical Therapy Education       Title: PT OT SLP Therapies (In Progress)       Topic: Physical Therapy (Done)       Point: Mobility training (Done)       Learning Progress Summary            Patient Acceptance, E,D, VU,NR by AB at 7/7/2025 1553    Eager, E,TB,D, VU,DU by SC at 7/6/2025 1600    Comment: reviewed HEP    Eager, E, VU by SC at 7/5/2025 1419    Comment: re idwed HEP                      Point: Home exercise program (Done)       Learning Progress Summary            Patient Acceptance, E,D, VU,NR by AB at 7/7/2025 1553    Eager, E,TB,D, VU,DU by SC at 7/6/2025 1600    Comment: reviewed HEP    Eager, E, VU by SC at 7/5/2025 1419    Comment: re idwed HEP                      Point: Body mechanics (Done)       Learning Progress Summary            Patient Acceptance, E,D, VU,NR by AB at 7/7/2025 1553    Eager, E,TB,D, VU,DU by SC at 7/6/2025 1600    Comment: reviewed HEP    Eager, E, VU by SC at 7/5/2025 1419    Comment: re idwed HEP                      Point: Precautions (Done)       Learning Progress Summary            Patient Acceptance, E,D, VU,NR by AB at 7/7/2025 1553    Eager, E,TB,D, VU,DU by SC at 7/6/2025 1600    Comment: reviewed HEP    Eager, E, VU by SC at 7/5/2025 1419    Comment: re idwed HEP                                      User Key       Initials Effective Dates Name Provider Type [facility] 02/03/23 -  Apolinar Butts  [place], PT Physical Therapist PT    AB 09/22/22 -  Kelli Mar PT Physical Therapist PT                  PT Recommendation and Plan     Progress: improving  Outcome Evaluation: Pt with good effort and increased ambulation distance to 20'+20' with CGA and RW. She continues to present below baseline with RLE pain, weakness, and impaired endurance. Further IPPT is warrented. PT will progress as able per POC.     Time Calculation:         PT Charges       Row Name 07/07/25 1553             Time Calculation    Start Time 1442  -AB      PT Received On 07/07/25  -AB         Timed Charges    56951 - PT Therapeutic Exercise Minutes 8  -AB      80290 - Gait Training Minutes  15  -AB      21130 - PT Therapeutic Activity Minutes 3  -AB         Total Minutes    Timed Charges Total Minutes 26  -AB       Total Minutes 26  -AB                User Key  (r) = Recorded By, (t) = Taken By, (c) = Cosigned By      Initials Name Provider Type    AB Kelli Mar, PT Physical Therapist                  Therapy Charges for Today       Code Description Service Date Service Provider Modifiers Qty    77974323011 HC PT THER PROC EA 15 MIN 7/7/2025 Kelli Mar, PT GP 1    29443804259 HC GAIT TRAINING EA 15 MIN 7/7/2025 Kelli Mar, PT GP 1            PT G-Codes  Outcome Measure Options: AM-PAC 6 Clicks Basic Mobility (PT)  AM-PAC 6 Clicks Score (PT): 17  AM-PAC 6 Clicks Score (OT): 16  PT Discharge Summary  Anticipated Discharge Disposition (PT): inpatient rehabilitation facility    Kelli Mar PT  7/7/2025

## 2025-07-07 NOTE — PLAN OF CARE
Goal Outcome Evaluation:  Plan of Care Reviewed With: patient        Progress: improving  Outcome Evaluation: Pt with good effort and increased ambulation distance to 20'+20' with CGA and RW. She continues to present below baseline with RLE pain, weakness, and impaired endurance. Further IPPT is warrented. PT will progress as able per POC.    Anticipated Discharge Disposition (PT): inpatient rehabilitation facility

## 2025-07-08 LAB
GLUCOSE BLDC GLUCOMTR-MCNC: 143 MG/DL (ref 70–130)
GLUCOSE BLDC GLUCOMTR-MCNC: 170 MG/DL (ref 70–130)
GLUCOSE BLDC GLUCOMTR-MCNC: 208 MG/DL (ref 70–130)
GLUCOSE BLDC GLUCOMTR-MCNC: 84 MG/DL (ref 70–130)

## 2025-07-08 PROCEDURE — 25010000002 HEPARIN (PORCINE) PER 1000 UNITS: Performed by: STUDENT IN AN ORGANIZED HEALTH CARE EDUCATION/TRAINING PROGRAM

## 2025-07-08 PROCEDURE — 97535 SELF CARE MNGMENT TRAINING: CPT

## 2025-07-08 PROCEDURE — 97110 THERAPEUTIC EXERCISES: CPT

## 2025-07-08 PROCEDURE — 63710000001 INSULIN LISPRO (HUMAN) PER 5 UNITS: Performed by: INTERNAL MEDICINE

## 2025-07-08 PROCEDURE — 63710000001 REVEFENACIN 175 MCG/3ML SOLUTION: Performed by: STUDENT IN AN ORGANIZED HEALTH CARE EDUCATION/TRAINING PROGRAM

## 2025-07-08 PROCEDURE — 97530 THERAPEUTIC ACTIVITIES: CPT

## 2025-07-08 PROCEDURE — 99232 SBSQ HOSP IP/OBS MODERATE 35: CPT | Performed by: INTERNAL MEDICINE

## 2025-07-08 PROCEDURE — 94799 UNLISTED PULMONARY SVC/PX: CPT

## 2025-07-08 PROCEDURE — 97116 GAIT TRAINING THERAPY: CPT

## 2025-07-08 PROCEDURE — 82948 REAGENT STRIP/BLOOD GLUCOSE: CPT

## 2025-07-08 PROCEDURE — 94664 DEMO&/EVAL PT USE INHALER: CPT

## 2025-07-08 RX ADMIN — HYDROCODONE BITARTRATE AND ACETAMINOPHEN 1 TABLET: 5; 325 TABLET ORAL at 08:33

## 2025-07-08 RX ADMIN — HEPARIN SODIUM 5000 UNITS: 5000 INJECTION INTRAVENOUS; SUBCUTANEOUS at 14:01

## 2025-07-08 RX ADMIN — ARFORMOTEROL TARTRATE 15 MCG: 15 SOLUTION RESPIRATORY (INHALATION) at 20:41

## 2025-07-08 RX ADMIN — HYDROCODONE BITARTRATE AND ACETAMINOPHEN 1 TABLET: 5; 325 TABLET ORAL at 21:06

## 2025-07-08 RX ADMIN — PREGABALIN 75 MG: 75 CAPSULE ORAL at 06:08

## 2025-07-08 RX ADMIN — VALSARTAN 160 MG: 160 TABLET, FILM COATED ORAL at 08:18

## 2025-07-08 RX ADMIN — LEVOTHYROXINE SODIUM 50 MCG: 0.05 TABLET ORAL at 08:17

## 2025-07-08 RX ADMIN — REVEFENACIN 175 MCG: 175 SOLUTION RESPIRATORY (INHALATION) at 09:56

## 2025-07-08 RX ADMIN — ISOSORBIDE MONONITRATE 30 MG: 30 TABLET, EXTENDED RELEASE ORAL at 06:08

## 2025-07-08 RX ADMIN — FERROUS SULFATE TAB 325 MG (65 MG ELEMENTAL FE) 325 MG: 325 (65 FE) TAB at 08:17

## 2025-07-08 RX ADMIN — Medication 10 ML: at 21:06

## 2025-07-08 RX ADMIN — CETIRIZINE HYDROCHLORIDE 5 MG: 10 TABLET, FILM COATED ORAL at 08:17

## 2025-07-08 RX ADMIN — PANTOPRAZOLE SODIUM 40 MG: 40 TABLET, DELAYED RELEASE ORAL at 06:08

## 2025-07-08 RX ADMIN — TIZANIDINE 4 MG: 4 TABLET ORAL at 21:06

## 2025-07-08 RX ADMIN — POLYETHYLENE GLYCOL 3350 17 G: 17 POWDER, FOR SOLUTION ORAL at 21:15

## 2025-07-08 RX ADMIN — TIZANIDINE 4 MG: 4 TABLET ORAL at 11:34

## 2025-07-08 RX ADMIN — BUDESONIDE 0.5 MG: 0.5 INHALANT RESPIRATORY (INHALATION) at 09:56

## 2025-07-08 RX ADMIN — MONTELUKAST 10 MG: 10 TABLET, FILM COATED ORAL at 21:06

## 2025-07-08 RX ADMIN — HYDROCODONE BITARTRATE AND ACETAMINOPHEN 1 TABLET: 5; 325 TABLET ORAL at 17:42

## 2025-07-08 RX ADMIN — HYDROCODONE BITARTRATE AND ACETAMINOPHEN 1 TABLET: 5; 325 TABLET ORAL at 14:01

## 2025-07-08 RX ADMIN — PREGABALIN 75 MG: 75 CAPSULE ORAL at 14:01

## 2025-07-08 RX ADMIN — HEPARIN SODIUM 5000 UNITS: 5000 INJECTION INTRAVENOUS; SUBCUTANEOUS at 21:06

## 2025-07-08 RX ADMIN — Medication 10 ML: at 08:25

## 2025-07-08 RX ADMIN — CYANOCOBALAMIN TAB 1000 MCG 1000 MCG: 1000 TAB at 08:17

## 2025-07-08 RX ADMIN — Medication 1000 UNITS: at 08:17

## 2025-07-08 RX ADMIN — INSULIN LISPRO 3 UNITS: 100 INJECTION, SOLUTION INTRAVENOUS; SUBCUTANEOUS at 11:39

## 2025-07-08 RX ADMIN — FLUTICASONE PROPIONATE 2 SPRAY: 50 SPRAY, METERED NASAL at 08:34

## 2025-07-08 RX ADMIN — BUDESONIDE 0.5 MG: 0.5 INHALANT RESPIRATORY (INHALATION) at 20:40

## 2025-07-08 RX ADMIN — PREGABALIN 75 MG: 75 CAPSULE ORAL at 21:06

## 2025-07-08 RX ADMIN — INSULIN LISPRO 2 UNITS: 100 INJECTION, SOLUTION INTRAVENOUS; SUBCUTANEOUS at 17:43

## 2025-07-08 RX ADMIN — ALLOPURINOL 100 MG: 100 TABLET ORAL at 08:17

## 2025-07-08 RX ADMIN — HEPARIN SODIUM 5000 UNITS: 5000 INJECTION INTRAVENOUS; SUBCUTANEOUS at 06:08

## 2025-07-08 RX ADMIN — HYDROCODONE BITARTRATE AND ACETAMINOPHEN 1 TABLET: 5; 325 TABLET ORAL at 06:08

## 2025-07-08 RX ADMIN — HYDROCODONE BITARTRATE AND ACETAMINOPHEN 1 TABLET: 5; 325 TABLET ORAL at 01:56

## 2025-07-08 RX ADMIN — DULOXETINE 60 MG: 60 CAPSULE, DELAYED RELEASE ORAL at 08:17

## 2025-07-08 RX ADMIN — Medication 1 TABLET: at 08:17

## 2025-07-08 NOTE — THERAPY TREATMENT NOTE
Patient Name: Raquel Mariee  : 1955    MRN: [id number]                              Today's Date: 2025       Admit Date: 2025    Visit Dx: No diagnosis found.  Patient Active Problem List   Diagnosis    Hypogammaglobulinemia    Gastric bypass status for obesity    CKD (chronic kidney disease), stage III    KALYN (obstructive sleep apnea)    Major depressive disorder in partial remission    Hypothyroidism    Fibromyalgia syndrome    Abdominal aortic aneurysm (AAA) without rupture    Nonrheumatic aortic valve stenosis    Vitamin D deficiency, unspecified     Anatomical narrow angle borderline glaucoma    Abnormal finding of blood chemistry, unspecified     Chronic diastolic heart failure    Prinzmetal angina    Common variable agammaglobulinemia    Polymyositis    Port-A-Cath in place    Abnormal CT of the chest    Acute bronchospasm    Acute kidney injury    Sinusitis, chronic    Anemia    Aortic valve stenosis and insufficiency, rheumatic    Body mass index (BMI) of 40.0 to 44.9 in adult    Calcium kidney stones    Choroidal nevus of left eye    CAD (coronary artery disease)    Claw toe, acquired    Combined forms of age-related cataract of both eyes    Depressive disorder    Essential hypertension with goal blood pressure less than 140/90    Severe persistent asthma with exacerbation    Glaucoma suspect of both eyes    Inappropriate sinus tachycardia    Migraine without aura    Mitral valve regurgitation    Osteoarthrosis involving lower leg    Pain in joint involving lower leg    Posterior vitreous detachment of both eyes    Primary osteoarthritis of left wrist    Anxiety disorder    Status post total knee replacement    Sleep disturbances    Senile nuclear sclerosis    Rotator cuff syndrome of left shoulder    Pure hypercholesterolemia    Thoracic aortic aneurysm without rupture    TIA (transient ischemic attack)    Tracheomalacia    Trochlear nerve palsy, left    Diabetes mellitus without  complication    Urge incontinence of urine    Acute exacerbation of chronic obstructive pulmonary disease (COPD)    Internal hemorrhoid, bleeding    Spinal stenosis    Bilateral sacroiliitis     Past Medical History:   Diagnosis Date    Anxiety     Aortic valve stenosis     Cardiac murmur     Cataract, bilateral     CHF (congestive heart failure)     Cholelithiasis GB out    Chronic kidney disease     Stage III    Clostridium difficile infection     in her 30s    Colon polyp Ever since having colonoscopies.    Common variable immunodeficiency     IVIG infusions    Compression fracture of lumbar spine, non-traumatic     COPD (chronic obstructive pulmonary disease)     Coronary artery disease     Prinz metal angina & aortic stenosis    Depression     Diabetes mellitus     type II    Eosinophilic asthma     Fibromyalgia, primary     Full dentures     GERD (gastroesophageal reflux disease)     History of transfusion     in 1979 at [facility].  No reaction noted, patient states she does have an antibody from transfusion.    Hypertension     Hypogammaglobulinemia     Hypothyroidism     Irritable bowel syndrome     Migraines     Morbid obesity     Optic neuritis     Optic neuropathy     Osteoarthritis     Prinzmetal angina 1990    Pseudomonas infection 1998    lungs    PTSD (post-traumatic stress disorder)     Pulmonary edema     Renal insufficiency     Sinus tachycardia 1990    Sleep apnea     no longer uses/needs cpap    Stroke     TIA about 7 years ago    Tachycardia     TIA (transient ischemic attack) 2017    Trochanteric bursitis 01/25/2023     Past Surgical History:   Procedure Laterality Date    APPENDECTOMY      BUNIONECTOMY Bilateral     CARDIAC CATHETERIZATION  2017    no stents needed    CARPAL TUNNEL RELEASE Right     COLONOSCOPY  2021    ENDOMETRIAL ABLATION  1997    EYE SURGERY  ? About 6-8 years ago    Laser for glaucoma    FRACTURE SURGERY  1995    No hardware; Tibeal plateau    GASTRIC BYPASS       HAND SURGERY Left     No hardware    HEMORRHOIDECTOMY N/A 04/09/2024    Procedure: HEMORRHOID BANDING X2;  Surgeon: Melissa Beck MD;  Location: [facility] OR;  Service: General;  Laterality: N/A;    INTERSTIM PLACEMENT N/A 05/03/2023    Procedure: INTERSTIM STAGES 1 AND 2 LEAD AND GENERATOR PLACEMENT;  Surgeon: Abner Nation MD;  Location: [facility] OR;  Service: Urology;  Laterality: N/A;    POSTERIOR VAGINAL REPAIR N/A 08/02/2023    Procedure: POSTERIOR COLPORRHAPHY;  Surgeon: Kellen Galan MD;  Location: [facility] OR;  Service: Gynecology;  Laterality: N/A;    RECTAL EXAMINATION UNDER ANESTHESIA N/A 04/09/2024    Procedure: RECTAL EXAM UNDER ANESTHESIA;  Surgeon: Melissa Beck MD;  Location: [facility] OR;  Service: General;  Laterality: N/A;    REPLACEMENT TOTAL KNEE BILATERAL      TEETH EXTRACTION      all of bottom teeth removed    THORACOTOMY      TONSILLECTOMY      TOTAL ABDOMINAL HYSTERECTOMY WITH SALPINGO OOPHORECTOMY      TRACHEAL SURGERY      Repaired via thoracotomy    TUBAL ABDOMINAL LIGATION  1983    VENOUS ACCESS DEVICE (PORT) INSERTION      port a cath in the left chest wall      General Information       Row Name 07/08/25 0948          OT Time and Intention    Document Type therapy note (daily note)  -AJ     Mode of Treatment occupational therapy  -       Row Name 07/08/25 0948          General Information    Patient Profile Reviewed yes  -AJ     Existing Precautions/Restrictions spinal;fall;other (see comments)  RLE pain  -AJ     Barriers to Rehab medically complex;previous functional deficit  -AJ       Row Name 07/08/25 0948          Cognition    Orientation Status (Cognition) oriented x 4  -       Row Name 07/08/25 0948          Safety Issues/Impairments Affecting Functional Mobility    Impairments Affecting Function (Mobility) endurance/activity tolerance;pain;postural/trunk control;strength;balance  -AJ               User Key  (r) = Recorded By, (t) = Taken By, (c) =  Cosigned By      Initials Name Provider Type     Stephanie Quarles OT Occupational Therapist                     Mobility/ADL's       Row Name 07/08/25 0949          Bed Mobility    Comment, (Bed Mobility) Sitting EOB upon OT arrival  -[facility] Name 07/08/25 0949          Transfers    Transfers sit-stand transfer;stand-sit transfer;toilet transfer  -       Row Name 07/08/25 0949          Sit-Stand Transfer    Sit-Stand [place] (Transfers) contact guard;1 person assist  -     Assistive Device (Sit-Stand Transfers) walker, front-wheeled  -     Comment, (Sit-Stand Transfer) Progressed to SBA  -[facility] Name 07/08/25 0949          Toilet Transfer    Type (Toilet Transfer) sit-stand;stand-sit  -     [place] Level (Toilet Transfer) standby assist;1 person assist  -     Assistive Device (Toilet Transfer) commode, bedside without drop arms;walker, front-wheeled  -     Comment, (Toilet Transfer) Multiple STS from [facility] during grooming  -[facility] Name 07/08/25 0949          Functional Mobility    Functional Mobility- Ind. Level standby assist  -     Functional Mobility- Device walker, front-wheeled  -     Functional Mobility-Distance (Feet) --  To and from bathroom  -[facility] Name 07/08/25 0949          Activities of Daily Living    BADL Assessment/Intervention upper body dressing;lower body dressing;grooming;bathing  -[facility] Name 07/08/25 0949          Hygiene Care    Oral Care teeth brushed - regular toothbrush  -[facility] Name 07/08/25 0949          Bathing Assessment/Intervention    [place] Level (Bathing) upper body;upper extremities;chest/trunk;set up  -     Position (Bathing) unsupported sitting  -[facility] Name 07/08/25 0949          Lower Body Dressing Assessment/Training    [place] Level (Lower Body Dressing) don;doff;socks;standby assist  -     Position (Lower Body Dressing) edge of bed sitting  -     Comment, (Lower Body Dressing) Pt has sock aid  at home that she utilizes as needed, able to achieve figure 4 today to don/doff.  -AJ       Row Name 07/08/25 0949          Upper Body Dressing Assessment/Training    [place] Level (Upper Body Dressing) don;doff;front opening garment;set up  -AJ     Position (Upper Body Dressing) unsupported sitting  -AJ       Row Name 07/08/25 0949          Grooming Assessment/Training    [place] Level (Grooming) hair care, combing/brushing;oral care regimen;wash face, hands;standby assist  -AJ     Position (Grooming) supported standing;unsupported sitting  -AJ     Comment, (Grooming) Pt alterated sitting and standing for tasks at sink. Stood for oral care and washing face, sat for hair care and UB bathing.  -AJ               User Key  (r) = Recorded By, (t) = Taken By, (c) = Cosigned By      Initials Name Provider Type    Stephanie Simon OT Occupational Therapist                   Obj/Interventions       Row Name 07/08/25 0955          Balance    Balance Assessment sitting static balance;sitting dynamic balance;sit to stand dynamic balance;standing static balance;standing dynamic balance  -     Static Sitting Balance supervision  -     Dynamic Sitting Balance standby assist  -AJ     Position, Sitting Balance unsupported;sitting edge of bed;other (see comments)  BSC  -     Static Standing Balance contact guard  -AJ     Dynamic Standing Balance contact guard;1-person assist  -     Position/Device Used, Standing Balance supported;walker, front-wheeled  -     Balance Interventions sitting;standing;sit to stand;supported;static;dynamic;occupation based/functional task  -               User Key  (r) = Recorded By, (t) = Taken By, (c) = Cosigned By      Initials Name Provider Type    Stephanie Simon OT Occupational Therapist                   Goals/Plan    No documentation.                  Clinical Impression       Row Name 07/08/25 0955          Pain Assessment    Pretreatment Pain Rating 3/10  -AJ      Posttreatment Pain Rating 4/10  -     Pain Location back  -     Pain Side/Orientation right;lower  -     Pain Management Interventions activity modification encouraged;exercise or physical activity utilized;positioning techniques utilized;premedicated for activity  -     Response to Pain Interventions activity participation with tolerable pain  -     Pre/Posttreatment Pain Comment 8/10 while standing/walking  -       Row Name 07/08/25 0955          Plan of Care Review    Plan of Care Reviewed With patient  -     Progress improving  -     Outcome Evaluation Pt progressed with activity tolerance and independence for ADLs. Pt ambulated to sink for grooming and UB bathing, alternating between sitting and standing for each task to manage pain. Re-educated on body mechanics/adaptive techniques and use of AE to decrease pain with activity and ADLs. Pt would benefit from ongoing skilled OT services to progress to PLOF. Rec d/c to IRF.  -       Row Name 07/08/25 0955          Therapy Plan Review/Discharge Plan (OT)    Anticipated Discharge Disposition (OT) inpatient rehabilitation facility  -       Row Name 07/08/25 0955          Vital Signs    Pre Systolic BP Rehab 149  -     Pre Treatment Diastolic BP 84  -     Pretreatment Heart Rate (beats/min) 90  -AJ     Posttreatment Heart Rate (beats/min) 90  -AJ     O2 Delivery Pre Treatment room air  -AJ     Post SpO2 (%) 96  -     O2 Delivery Post Treatment room air  -AJ     Pre Patient Position Sitting  -     Intra Patient Position Standing  -     Post Patient Position Sitting  -       Row Name 07/08/25 0955          Positioning and Restraints    Pre-Treatment Position in bed  -     Post Treatment Position chair  -     In Chair notified nsg;reclined;sitting;call light within reach;encouraged to call for assist;heels elevated;waffle cushion;legs elevated  alarm status unchanged  -               User Key  (r) = Recorded By, (t) = Taken By, (c)  = Cosigned By      Initials Name Provider Type    Stephanie Simon OT Occupational Therapist                   Outcome Measures       Row Name 07/08/25 1004          How much help from another is currently needed...    Putting on and taking off regular lower body clothing? 3  -AJ     Bathing (including washing, rinsing, and drying) 3  -AJ     Toileting (which includes using toilet bed pan or urinal) 4  -AJ     Putting on and taking off regular upper body clothing 3  -AJ     Taking care of personal grooming (such as brushing teeth) 3  -AJ     Eating meals 4  -AJ     AM-PAC 6 Clicks Score (OT) 20  -AJ       Row Name 07/08/25 0815          How much help from another person do you currently need...    Turning from your back to your side while in flat bed without using bedrails? 4  -MK     Moving from lying on back to sitting on the side of a flat bed without bedrails? 3  -MK     Moving to and from a bed to a chair (including a wheelchair)? 3  -MK     Standing up from a chair using your arms (e.g., wheelchair, bedside chair)? 3  -MK     Climbing 3-5 steps with a railing? 2  -MK     To walk in hospital room? 3  -MK     AM-PAC 6 Clicks Score (PT) 18  -MK     Highest Level of Mobility Goal Walk 10 Steps or More-6  -MK       Row Name 07/08/25 1004          Functional Assessment    Outcome Measure Options AM-PAC 6 Clicks Daily Activity (OT)  -               User Key  (r) = Recorded By, (t) = Taken By, (c) = Cosigned By      Initials Name Provider Type    Dulce Newman, RN Registered Nurse    Stephanie Simon OT Occupational Therapist                    Occupational Therapy Education       Title: PT OT SLP Therapies (In Progress)       Topic: Occupational Therapy (In Progress)       Point: ADL training (In Progress)       Learning Progress Summary            Patient Acceptance, E,D, NR by  at 7/5/2025 1618                      Point: Precautions (In Progress)       Learning Progress Summary            Patient  Acceptance, E,D, NR by  at 7/5/2025 1618                      Point: Body mechanics (In Progress)       Learning Progress Summary            Patient Acceptance, E,D, NR by  at 7/5/2025 1618                                      User Key       Initials Effective Dates Name Provider Type [facility] 04/16/25 -  Nehemiah Platt OT Occupational Therapist OT                  OT Recommendation and Plan     Plan of Care Review  Plan of Care Reviewed With: patient  Progress: improving  Outcome Evaluation: Pt progressed with activity tolerance and independence for ADLs. Pt ambulated to sink for grooming and UB bathing, alternating between sitting and standing for each task to manage pain. Re-educated on body mechanics/adaptive techniques and use of AE to decrease pain with activity and ADLs. Pt would benefit from ongoing skilled OT services to progress to PLOF. Rec d/c to IRF.     Time Calculation:         Time Calculation- OT       Row Name 07/08/25 1005             Time Calculation- OT    OT Start Time 0920  -AJ      OT Received On 07/08/25  -      OT Goal Re-Cert Due Date 07/15/25  -         Timed Charges    13698 - OT Therapeutic Activity Minutes 5  -AJ      52912 - OT Self Care/Mgmt Minutes 20  -AJ         Total Minutes    Timed Charges Total Minutes 25  -AJ       Total Minutes 25  -AJ                User Key  (r) = Recorded By, (t) = Taken By, (c) = Cosigned By      Initials Name Provider Type     Stephanie Quarles OT Occupational Therapist                  Therapy Charges for Today       Code Description Service Date Service Provider Modifiers Qty    43392096009 HC OT THERAPEUTIC ACT EA 15 MIN 7/8/2025 Stephanie Quarles OT GO 1    63990582787 HC OT SELF CARE/MGMT/TRAIN EA 15 MIN 7/8/2025 Stephanie Quarles OT GO 1                 Stephanie Quarles OT  7/8/2025

## 2025-07-08 NOTE — THERAPY TREATMENT NOTE
Patient Name: Raquel Mariee  : 1955    MRN: [id number]                              Today's Date: 2025       Admit Date: 2025    Visit Dx: No diagnosis found.  Patient Active Problem List   Diagnosis    Hypogammaglobulinemia    Gastric bypass status for obesity    CKD (chronic kidney disease), stage III    KALYN (obstructive sleep apnea)    Major depressive disorder in partial remission    Hypothyroidism    Fibromyalgia syndrome    Abdominal aortic aneurysm (AAA) without rupture    Nonrheumatic aortic valve stenosis    Vitamin D deficiency, unspecified     Anatomical narrow angle borderline glaucoma    Abnormal finding of blood chemistry, unspecified     Chronic diastolic heart failure    Prinzmetal angina    Common variable agammaglobulinemia    Polymyositis    Port-A-Cath in place    Abnormal CT of the chest    Acute bronchospasm    Acute kidney injury    Sinusitis, chronic    Anemia    Aortic valve stenosis and insufficiency, rheumatic    Body mass index (BMI) of 40.0 to 44.9 in adult    Calcium kidney stones    Choroidal nevus of left eye    CAD (coronary artery disease)    Claw toe, acquired    Combined forms of age-related cataract of both eyes    Depressive disorder    Essential hypertension with goal blood pressure less than 140/90    Severe persistent asthma with exacerbation    Glaucoma suspect of both eyes    Inappropriate sinus tachycardia    Migraine without aura    Mitral valve regurgitation    Osteoarthrosis involving lower leg    Pain in joint involving lower leg    Posterior vitreous detachment of both eyes    Primary osteoarthritis of left wrist    Anxiety disorder    Status post total knee replacement    Sleep disturbances    Senile nuclear sclerosis    Rotator cuff syndrome of left shoulder    Pure hypercholesterolemia    Thoracic aortic aneurysm without rupture    TIA (transient ischemic attack)    Tracheomalacia    Trochlear nerve palsy, left    Diabetes mellitus without  complication    Urge incontinence of urine    Acute exacerbation of chronic obstructive pulmonary disease (COPD)    Internal hemorrhoid, bleeding    Spinal stenosis    Bilateral sacroiliitis     Past Medical History:   Diagnosis Date    Anxiety     Aortic valve stenosis     Cardiac murmur     Cataract, bilateral     CHF (congestive heart failure)     Cholelithiasis GB out    Chronic kidney disease     Stage III    Clostridium difficile infection     in her 30s    Colon polyp Ever since having colonoscopies.    Common variable immunodeficiency     IVIG infusions    Compression fracture of lumbar spine, non-traumatic     COPD (chronic obstructive pulmonary disease)     Coronary artery disease     Prinz metal angina & aortic stenosis    Depression     Diabetes mellitus     type II    Eosinophilic asthma     Fibromyalgia, primary     Full dentures     GERD (gastroesophageal reflux disease)     History of transfusion     in 1979 at [facility] [place].  No reaction noted, patient states she does have an antibody from transfusion.    Hypertension     Hypogammaglobulinemia     Hypothyroidism     Irritable bowel syndrome     Migraines     Morbid obesity     Optic neuritis     Optic neuropathy     Osteoarthritis     Prinzmetal angina 1990    Pseudomonas infection 1998    lungs    PTSD (post-traumatic stress disorder)     Pulmonary edema     Renal insufficiency     Sinus tachycardia 1990    Sleep apnea     no longer uses/needs cpap    Stroke     TIA about 7 years ago    Tachycardia     TIA (transient ischemic attack) 2017    Trochanteric bursitis 01/25/2023     Past Surgical History:   Procedure Laterality Date    APPENDECTOMY      BUNIONECTOMY Bilateral     CARDIAC CATHETERIZATION  2017    no stents needed    CARPAL TUNNEL RELEASE Right     COLONOSCOPY  2021    ENDOMETRIAL ABLATION  1997    EYE SURGERY  ? About 6-8 years ago    Laser for glaucoma    FRACTURE SURGERY  1995    No hardware; Tibeal plateau    GASTRIC BYPASS       HAND SURGERY Left     No hardware    HEMORRHOIDECTOMY N/A 04/09/2024    Procedure: HEMORRHOID BANDING X2;  Surgeon: Melissa Beck MD;  Location: [facility] OR;  Service: General;  Laterality: N/A;    INTERSTIM PLACEMENT N/A 05/03/2023    Procedure: INTERSTIM STAGES 1 AND 2 LEAD AND GENERATOR PLACEMENT;  Surgeon: Abner Nation MD;  Location: [facility] OR;  Service: Urology;  Laterality: N/A;    POSTERIOR VAGINAL REPAIR N/A 08/02/2023    Procedure: POSTERIOR COLPORRHAPHY;  Surgeon: Kellen Galan MD;  Location: [facility] OR;  Service: Gynecology;  Laterality: N/A;    RECTAL EXAMINATION UNDER ANESTHESIA N/A 04/09/2024    Procedure: RECTAL EXAM UNDER ANESTHESIA;  Surgeon: Melissa Beck MD;  Location: [facility] OR;  Service: General;  Laterality: N/A;    REPLACEMENT TOTAL KNEE BILATERAL      TEETH EXTRACTION      all of bottom teeth removed    THORACOTOMY      TONSILLECTOMY      TOTAL ABDOMINAL HYSTERECTOMY WITH SALPINGO OOPHORECTOMY      TRACHEAL SURGERY      Repaired via thoracotomy    TUBAL ABDOMINAL LIGATION  1983    VENOUS ACCESS DEVICE (PORT) INSERTION      port a cath in the left chest wall      General Information       Row Name 07/08/25 1530          Physical Therapy Time and Intention    Document Type therapy note (daily note)  -CK     Mode of Treatment physical therapy;individual therapy  -CK       Row Name 07/08/25 1530          General Information    Patient Profile Reviewed yes  -CK     Existing Precautions/Restrictions spinal;fall;other (see comments)  RLE pain  -CK     Barriers to Rehab medically complex;previous functional deficit  -CK       Row Name 07/08/25 1530          Cognition    Orientation Status (Cognition) oriented x 4  -CK       Row Name 07/08/25 1530          Safety Issues/Impairments Affecting Functional Mobility    Safety Issues Affecting Function (Mobility) insight into deficits/self-awareness;safety precaution awareness  -CK     Impairments Affecting Function (Mobility)  endurance/activity tolerance;pain;postural/trunk control;strength;balance  -CK               User Key  (r) = Recorded By, (t) = Taken By, (c) = Cosigned By      Initials Name Provider Type    CK Jeanette Reynoso, PT Physical Therapist                   Mobility       Row Name 07/08/25 1531          Bed Mobility    Bed Mobility sit-supine  -CK     Sit-Supine [place] (Bed Mobility) standby assist  -CK     Assistive Device (Bed Mobility) leg   -CK     Comment, (Bed Mobility) reviewed logroll technique for comfort  -CK       Row Name 07/08/25 1531          Sit-Stand Transfer    Sit-Stand [place] (Transfers) standby assist  -CK     Assistive Device (Sit-Stand Transfers) walker, 4-wheeled  -CK     Comment, (Sit-Stand Transfer) reviewed safety awareness with rollator including using brakes prior to sitting/standing and still pushing up from chair. Patient able to demonstrate good safety awareness  -CK       Row Name 07/08/25 1531          Gait/Stairs (Locomotion)    [place] Level (Gait) contact guard;1 person assist;verbal cues  -CK     Assistive Device (Gait) walker, 4-wheeled  -CK     Patient was able to Ambulate yes  -CK     Distance in Feet (Gait) 10  +15  -CK     Deviations/Abnormal Patterns (Gait) right sided deviations;stride length decreased;weight shifting decreased;gait speed decreased;antalgic  -CK     Bilateral Gait Deviations forward flexed posture  -CK     Right Sided Gait Deviations weight shift ability decreased  -CK     Comment, (Gait/Stairs) Patient ambulated with step to gait pattern with decreased weight acceptance on RLE. She began to use rollator to offload RLE due to pain and PT prompted patient to take seated rest before ambulating back to chair. Education provided regarding safety with FWW as opposed to rollator since she is using AD to offload her RLE. Also educated patient that she needs to avoid pushing through rollator if she is going to use rollator at home.  -CK                User Key  (r) = Recorded By, (t) = Taken By, (c) = Cosigned By      Initials Name Provider Type    CK Jeanette Reynoso PT Physical Therapist                   Obj/Interventions       Row Name 07/08/25 1602          Motor Skills    Therapeutic Exercise other (see comments)  supine unilateral knee to chest x5 then bilateral knee to chest x3. BKFO x5 each in hooklying  -CK       Row Name 07/08/25 1602          Balance    Balance Assessment sitting static balance;standing static balance;standing dynamic balance  -CK     Static Sitting Balance supervision  -CK     Position, Sitting Balance unsupported;sitting edge of bed;sitting in chair  -CK     Static Standing Balance contact guard  -CK     Dynamic Standing Balance contact guard  -CK     Position/Device Used, Standing Balance supported;walker, 4-wheeled  -CK               User Key  (r) = Recorded By, (t) = Taken By, (c) = Cosigned By      Initials Name Provider Type    CK Jeanette Reynoso PT Physical Therapist                   Goals/Plan    No documentation.                  Clinical Impression       Row Name 07/08/25 1610          Pain    Pain Location extremity;hip  -CK     Pain Side/Orientation right;lower  -CK     Pain Management Interventions exercise or physical activity utilized;positioning techniques utilized  -CK     Response to Pain Interventions activity participation with increased pain  -CK     Additional Documentation Pain Scale: FACES Pre/Post-Treatment (Group)  -CK       Row Name 07/08/25 1610          Pain Scale: FACES Pre/Post-Treatment    Pain: FACES Scale, Pretreatment 2-->hurts little bit  -CK     Posttreatment Pain Rating 4-->hurts little more  -CK       Row Name 07/08/25 1610          Plan of Care Review    Plan of Care Reviewed With patient  -CK     Progress no change  -CK     Outcome Evaluation Patient remains limtied by pain in RLE with mobility. Trialed rollator today at patient's request and she was able to ambulate 10'+15'  CGA with seated rest on rollator. Educated patient that she is safer to use FWW as she utilizes AD to push on and offload her RLE. IPPT remains indicated to address current deficits. Will continue with current PT POC.  -CK       Row Name 07/08/25 1610          Vital Signs    Pre Systolic BP Rehab 169  -CK     Pre Treatment Diastolic BP 94  -CK     Pretreatment Heart Rate (beats/min) 82  -CK     O2 Delivery Pre Treatment room air  -CK     O2 Delivery Intra Treatment room air  -CK     O2 Delivery Post Treatment room air  -CK     Pre Patient Position Sitting  -CK     Post Patient Position Supine  -CK       Row Name 07/08/25 1610          Positioning and Restraints    Pre-Treatment Position sitting in chair/recliner  -CK     Post Treatment Position bed  -CK     In Bed fowlers;call light within reach;encouraged to call for assist;notified nsg;heels elevated  alarm unchanged  -CK               User Key  (r) = Recorded By, (t) = Taken By, (c) = Cosigned By      Initials Name Provider Type    CK Jeanette Reynoso, PT Physical Therapist                   Outcome Measures       Row Name 07/08/25 1613 07/08/25 0815       How much help from another person do you currently need...    Turning from your back to your side while in flat bed without using bedrails? 4  -CK 4  -MK    Moving from lying on back to sitting on the side of a flat bed without bedrails? 3  -CK 3  -MK    Moving to and from a bed to a chair (including a wheelchair)? 3  -CK 3  -MK    Standing up from a chair using your arms (e.g., wheelchair, bedside chair)? 3  -CK 3  -MK    Climbing 3-5 steps with a railing? 2  -CK 2  -MK    To walk in hospital room? 3  -CK 3  -MK    AM-PAC 6 Clicks Score (PT) 18  -CK 18  -MK    Highest Level of Mobility Goal Walk 10 Steps or More-6  -CK Walk 10 Steps or More-6  -MK      Row Name 07/08/25 1613 07/08/25 1004       Functional Assessment    Outcome Measure Options AM-PAC 6 Clicks Basic Mobility (PT)  -CK AM-PAC 6 Clicks Daily  Activity (OT)  -              User Key  (r) = Recorded By, (t) = Taken By, (c) = Cosigned By      Initials Name Provider Type    Dulce Newman, RN Registered Nurse    CK Jeanette Reynoso, PT Physical Therapist    Stephanie Simon, OT Occupational Therapist                                 Physical Therapy Education       Title: PT OT SLP Therapies (In Progress)       Topic: Physical Therapy (Done)       Point: Mobility training (Done)       Learning Progress Summary            Patient Acceptance, E, VU by  at 7/8/2025 1613    Acceptance, E,D, VU,NR by AB at 7/7/2025 1553    Eager, E,TB,D, VU,DU by SC at 7/6/2025 1600    Comment: reviewed HEP    Eager, E, VU by SC at 7/5/2025 1419    Comment: re idwed HEP                      Point: Home exercise program (Done)       Learning Progress Summary            Patient Acceptance, E, VU by  at 7/8/2025 1613    Acceptance, E,D, VU,NR by AB at 7/7/2025 1553    Eager, E,TB,D, VU,DU by SC at 7/6/2025 1600    Comment: reviewed HEP    Eager, E, VU by SC at 7/5/2025 1419    Comment: re idwed HEP                      Point: Body mechanics (Done)       Learning Progress Summary            Patient Acceptance, E, VU by  at 7/8/2025 1613    Acceptance, E,D, VU,NR by AB at 7/7/2025 1553    Eager, E,TB,D, VU,DU by SC at 7/6/2025 1600    Comment: reviewed HEP    Eager, E, VU by SC at 7/5/2025 1419    Comment: re idwed HEP                      Point: Precautions (Done)       Learning Progress Summary            Patient Acceptance, E, VU by  at 7/8/2025 1613    Acceptance, E,D, VU,NR by AB at 7/7/2025 1553    Eager, E,TB,D, VU,DU by SC at 7/6/2025 1600    Comment: reviewed HEP    Eager, E, VU by SC at 7/5/2025 1419    Comment: re idwed HEP                                      User Key       Initials Effective Dates Name Provider Type Discipline    SC 02/03/23 -  Apolinar Butts, PT Physical Therapist PT    AB 09/22/22 -  Kelli Mar, PT Physical Therapist PT    CK  02/06/24 -  Jeanette Reynoso PT Physical Therapist PT                  PT Recommendation and Plan     Progress: no change  Outcome Evaluation: Patient remains limtied by pain in RLE with mobility. Trialed rollator today at patient's request and she was able to ambulate 10'+15' CGA with seated rest on rollator. Educated patient that she is safer to use FWW as she utilizes AD to push on and offload her RLE. IPPT remains indicated to address current deficits. Will continue with current PT POC.     Time Calculation:         PT Charges       Row Name 07/08/25 1614             Time Calculation    Start Time 1442  -CK      PT Received On 07/08/25  -CK         Timed Charges    82017 - PT Therapeutic Exercise Minutes 10  -CK      23151 - Gait Training Minutes  13  -CK         Total Minutes    Timed Charges Total Minutes 23  -CK       Total Minutes 23  -CK                User Key  (r) = Recorded By, (t) = Taken By, (c) = Cosigned By      Initials Name Provider Type    CK Jeanette Reynoso, PT Physical Therapist                  Therapy Charges for Today       Code Description Service Date Service Provider Modifiers Qty    45416945959 HC PT THER PROC EA 15 MIN 7/8/2025 Jeanette Reynoso, PT GP 1    72586252349 HC GAIT TRAINING EA 15 MIN 7/8/2025 Jeanette Reynoso, PT GP 1            PT G-Codes  Outcome Measure Options: AM-PAC 6 Clicks Basic Mobility (PT)  AM-PAC 6 Clicks Score (PT): 18  AM-PAC 6 Clicks Score (OT): 20  PT Discharge Summary  Anticipated Discharge Disposition (PT): inpatient rehabilitation facility    Jeanette Reynoso PT  7/8/2025

## 2025-07-08 NOTE — PLAN OF CARE
Goal Outcome Evaluation:  Plan of Care Reviewed With: patient        Progress: no change  Outcome Evaluation: Patient remains limtied by pain in RLE with mobility. Trialed rollator today at patient's request and she was able to ambulate 10'+15' CGA with seated rest on rollator. Educated patient that she is safer to use FWW as she utilizes AD to push on and offload her RLE. IPPT remains indicated to address current deficits. Will continue with current PT POC.    Anticipated Discharge Disposition (PT): inpatient rehabilitation facility

## 2025-07-08 NOTE — PLAN OF CARE
Goal Outcome Evaluation:  Plan of Care Reviewed With: patient        Progress: no change  Outcome Evaluation: Up to recliner and tolerates well. Glucoses 143, and 208. L subclavian port is accessed and has been flushed. Remains sinus on the monitor. Pain managed with oral medications see MAR. Refusal of care for bed alarms remains in effect. Waiting a discharge plan when ready.

## 2025-07-08 NOTE — PLAN OF CARE
Goal Outcome Evaluation:  Plan of Care Reviewed With: patient        Progress: improving  Outcome Evaluation: Pt progressed with activity tolerance and independence for ADLs. Pt ambulated to sink for grooming and UB bathing, alternating between sitting and standing for each task to manage pain. Re-educated on body mechanics/adaptive techniques and use of AE to decrease pain with activity and ADLs. Pt would benefit from ongoing skilled OT services to progress to PLOF. Rec d/c to IRF.    Anticipated Discharge Disposition (OT): inpatient rehabilitation facility

## 2025-07-08 NOTE — CASE MANAGEMENT/SOCIAL WORK
Continued Stay Note  [facility]     Patient Name: Raquel Mariee  MRN: [id number]  Today's Date: 7/8/2025    Admit Date: 7/4/2025    Plan: rehab   Discharge Plan       Row Name 07/08/25 1202       Plan    Plan rehab    Patient/Family in Agreement with Plan yes    Plan Comments Referral faxed to [facility] in [place]. VM left with admissions coordinator at [facility]. [facility] has no SRU beds at this time. Patient updated at bedside. CM will continue to follow and assist with discharge planning.    Final Discharge Disposition Code 03 - skilled nursing facility (SNF)                   Discharge Codes    No documentation.                 Expected Discharge Date and Time       Expected Discharge Date Expected Discharge Time    Jul 9, 2025               Vincent Enriquez RN

## 2025-07-08 NOTE — PLAN OF CARE
Goal Outcome Evaluation:  Plan of Care Reviewed With: patient        Progress: improving     Pt is A&Ox4. VSS on room air. NSR on tele. Pt has slept off and on throughout the shift. Scheduled pain meds given as ordered. PRN zanaflex given as ordered. Up independently to BSC; voiding spontaneously. Anticipating d/c to rehab when medically ready.

## 2025-07-08 NOTE — PROGRESS NOTES
[facility] Medicine Services  PROGRESS NOTE    Patient Name: Raquel Mariee  : 1955  MRN: [id number]    Date of Admission: 2025  Primary Care Physician: Sanjeev Rawls MD    Subjective   Subjective     CC: Follow-up back pain    HPI: No acute events overnight, patient feels much better this morning, pain is controlled      Objective   Objective     Vital Signs:   Temp:  [97.7 °F (36.5 °C)-98.2 °F (36.8 °C)] 97.7 °F (36.5 °C)  Heart Rate:  [73-95] 86  Resp:  [16-20] 20  BP: (105-177)/(62-93) 163/78     Physical Exam:  Constitutional: No acute distress, awake, alert  HENT: NCAT, mucous membranes moist  Respiratory: Clear to auscultation bilaterally, respiratory effort normal   Cardiovascular: RRR, no murmurs, rubs, or gallops  Gastrointestinal: Positive bowel sounds, soft, nontender, nondistended  Musculoskeletal: No bilateral ankle edema  Psychiatric: Appropriate affect, cooperative  Neurologic: Oriented x 3, nonfocal    Results Reviewed:  LAB RESULTS:      Lab 25  0434 25  1227   WBC 5.49 6.81   HEMOGLOBIN 12.5 12.1   HEMATOCRIT 36.8 35.4   PLATELETS 205 211   NEUTROS ABS  --  5.31   IMMATURE GRANS (ABS)  --  0.04   LYMPHS ABS  --  0.92   MONOS ABS  --  0.51   EOS ABS  --  0.00   MCV 95.8 93.7         Lab 25  0434 25  1227   SODIUM 131* 127*   POTASSIUM 4.4 4.5   CHLORIDE 96* 93*   CO2 23.7 22.8   ANION GAP 11.3 11.2   BUN 10.2 15.0   CREATININE 0.87 0.95   EGFR 72.2 65.0   GLUCOSE 160* 130*   CALCIUM 8.6 8.9   MAGNESIUM 2.0  --    TSH 1.450  --          Lab 25  1227   TOTAL PROTEIN 7.0   ALBUMIN 3.8   GLOBULIN 3.2   ALT (SGPT) 18   AST (SGOT) 30   BILIRUBIN 0.4   ALK PHOS 44                     Brief Urine Lab Results  (Last result in the past 365 days)        Color   Clarity   Blood   Leuk Est   Nitrite   Protein   CREAT   Urine HCG        25 1327 Yellow   Clear   Trace   Small (1+)   Negative   Negative                    Microbiology Results Abnormal       None            No radiology results from the last 24 hrs    Results for orders placed in visit on 03/28/25    Adult Transthoracic Echo Complete W/ Cont if Necessary Per Protocol 03/31/2025  2:42 PM    Interpretation Summary  1.  Normal left ventricular size and systolic function, LVEF 50-55%.  2.  Moderate to severe concentric LVH.  3.  Grade 2 diastolic dysfunction.  4.  Normal right ventricular size and systolic function.  5.  Moderately increased left atrial volume index.  6.  Moderate calcification of the aortic valve with moderate aortic stenosis.  7.  Moderate aortic regurgitation.  8.  Mild mitral regurgitation.      Current medications:  Scheduled Meds:allopurinol, 100 mg, Oral, Daily  arformoterol, 15 mcg, Nebulization, BID - RT   And  budesonide, 0.5 mg, Nebulization, BID - RT   And  revefenacin, 175 mcg, Nebulization, Daily - RT  buPROPion SR, 150 mg, Oral, BID  cetirizine, 5 mg, Oral, Daily  cholecalciferol, 1,000 Units, Oral, Daily  DULoxetine, 60 mg, Oral, Daily  ferrous sulfate, 325 mg, Oral, Daily With Breakfast  fluticasone, 2 spray, Each Nare, Daily  heparin (porcine), 5,000 Units, Subcutaneous, Q8H  HYDROcodone-acetaminophen, 1 tablet, Oral, Q4H  insulin lispro, 2-7 Units, Subcutaneous, 4x Daily AC & at Bedtime  isosorbide mononitrate, 30 mg, Oral, QAM  levothyroxine, 50 mcg, Oral, Daily  montelukast, 10 mg, Oral, Nightly  multivitamin with minerals, 1 tablet, Oral, Daily  pantoprazole, 40 mg, Oral, Q AM  pregabalin, 75 mg, Oral, Q8H  sodium chloride, 10 mL, Intravenous, Q12H  valsartan, 160 mg, Oral, Daily  vitamin B-12, 1,000 mcg, Oral, Daily      Continuous Infusions:   PRN Meds:.  acetaminophen **OR** acetaminophen **OR** acetaminophen    albuterol    senna-docusate sodium **AND** polyethylene glycol **AND** bisacodyl **AND** bisacodyl    Calcium Replacement - Follow Nurse / BPA Driven Protocol    dextrose    dextrose    [Held by provider]  furosemide    glucagon (human recombinant)    HYDROmorphone **AND** naloxone    Magnesium Standard Dose Replacement - Follow Nurse / BPA Driven Protocol    Morphine **AND** naloxone    ondansetron ODT    Phosphorus Replacement - Follow Nurse / BPA Driven Protocol    Potassium Replacement - Follow Nurse / BPA Driven Protocol    sodium chloride    sodium chloride    tiZANidine    Assessment & Plan   Assessment & Plan     Active Hospital Problems    Diagnosis  POA    **Spinal stenosis [M48.00]  Yes    Bilateral sacroiliitis [M46.1]  Yes    Aortic valve stenosis and insufficiency, rheumatic [I06.2]  Yes    CAD (coronary artery disease) [I25.10]  Yes    Chronic diastolic heart failure [I50.32]  Yes    KALYN (obstructive sleep apnea) [G47.33]  Yes    CKD (chronic kidney disease), stage III [N18.30]  Yes    Hypothyroidism [E03.9]  Yes      Resolved Hospital Problems   No resolved problems to display.      Brief Hospital Course to date:  Raquel Mariee is a 69 y.o. female with history of HFpEF, CVID on IVIg monthly, DMII, Neuropathy, anemia, GERD, depression and hypothyroid who presents with back and radicular pain in the right leg.     Lumbar radiculopathy  - patient seen by Neurosurgery, patient with disc extrusion at L2/3  - continue conservative management, Lyrica added, dose increased to 75 mg every 8 hours, seems to be tolerating this well.  - PT/OT following, recommend rehab, CM following  - follow up with Neurosurgery in clinic as an outpatient in 2 weeks     Hyponatremia  - sodium 127 at admission, improved to 131   - asymptomatic and appears chronic  - serum Osm 281  - Okay to resume as needed Lasix     Asthma  - continue nebs     UTI vs asymptomatic bacteriuria  - culture shows 100K [place], however only 6-10 WBCs and trace bacteria on UA  - afebrile, no leukocytosis, denies dysuria or increased frequency  - multiple drug allergies, will defer antibiotics at present, monitor clinically,      Chronic HFpEF    Hypertension  - Currently seems compensated   -Continue Imdur, valsartan  - restart home prn Lasix     Well-controlled type 2 diabetes,   Neuropathy  -FSBG's reviewed and appropriate  -Continue SSI     CVID  - on monthly IVIg through her Immunologist     Iron def anemia  -Continue iron supplement     GERD-continue PPI     Depression  -Continue Wellbutrin, Cymbalta     Hypothyroidism  - levothyroxine  - TSH 1.4     Expected Discharge Location and Transportation: Inpatient rehab  Expected Discharge   Expected Discharge Date: 7/9/2025; Expected Discharge Time:      VTE Prophylaxis:  Pharmacologic VTE prophylaxis orders are present.    AM-PAC 6 Clicks Score (PT): 17 (07/07/25 2110)    CODE STATUS:   Code Status and Medical Interventions: CPR (Attempt to Resuscitate); Full Support   Ordered at: 07/04/25 9278     Code Status (Patient has no pulse and is not breathing):    CPR (Attempt to Resuscitate)     Medical Interventions (Patient has pulse or is breathing):    Full Support       Adolfo Velazco MD  07/08/25

## 2025-07-09 LAB
GLUCOSE BLDC GLUCOMTR-MCNC: 119 MG/DL (ref 70–130)
GLUCOSE BLDC GLUCOMTR-MCNC: 123 MG/DL (ref 70–130)
GLUCOSE BLDC GLUCOMTR-MCNC: 157 MG/DL (ref 70–130)
GLUCOSE BLDC GLUCOMTR-MCNC: 159 MG/DL (ref 70–130)

## 2025-07-09 PROCEDURE — 63710000001 INSULIN LISPRO (HUMAN) PER 5 UNITS: Performed by: INTERNAL MEDICINE

## 2025-07-09 PROCEDURE — 82948 REAGENT STRIP/BLOOD GLUCOSE: CPT

## 2025-07-09 PROCEDURE — 25010000002 HEPARIN (PORCINE) PER 1000 UNITS: Performed by: STUDENT IN AN ORGANIZED HEALTH CARE EDUCATION/TRAINING PROGRAM

## 2025-07-09 PROCEDURE — 94664 DEMO&/EVAL PT USE INHALER: CPT

## 2025-07-09 PROCEDURE — 99232 SBSQ HOSP IP/OBS MODERATE 35: CPT | Performed by: INTERNAL MEDICINE

## 2025-07-09 PROCEDURE — 63710000001 REVEFENACIN 175 MCG/3ML SOLUTION: Performed by: STUDENT IN AN ORGANIZED HEALTH CARE EDUCATION/TRAINING PROGRAM

## 2025-07-09 PROCEDURE — 97110 THERAPEUTIC EXERCISES: CPT

## 2025-07-09 PROCEDURE — 94799 UNLISTED PULMONARY SVC/PX: CPT

## 2025-07-09 PROCEDURE — 97530 THERAPEUTIC ACTIVITIES: CPT

## 2025-07-09 RX ORDER — POLYETHYLENE GLYCOL 3350 17 G/17G
17 POWDER, FOR SOLUTION ORAL DAILY
Status: DISCONTINUED | OUTPATIENT
Start: 2025-07-09 | End: 2025-07-11 | Stop reason: HOSPADM

## 2025-07-09 RX ADMIN — ISOSORBIDE MONONITRATE 30 MG: 30 TABLET, EXTENDED RELEASE ORAL at 06:23

## 2025-07-09 RX ADMIN — FERROUS SULFATE TAB 325 MG (65 MG ELEMENTAL FE) 325 MG: 325 (65 FE) TAB at 08:43

## 2025-07-09 RX ADMIN — POLYETHYLENE GLYCOL 3350 17 G: 17 POWDER, FOR SOLUTION ORAL at 11:56

## 2025-07-09 RX ADMIN — HYDROCODONE BITARTRATE AND ACETAMINOPHEN 1 TABLET: 5; 325 TABLET ORAL at 12:43

## 2025-07-09 RX ADMIN — Medication 10 ML: at 21:00

## 2025-07-09 RX ADMIN — VALSARTAN 160 MG: 160 TABLET, FILM COATED ORAL at 08:43

## 2025-07-09 RX ADMIN — PREGABALIN 75 MG: 75 CAPSULE ORAL at 06:23

## 2025-07-09 RX ADMIN — Medication 10 ML: at 08:44

## 2025-07-09 RX ADMIN — HEPARIN SODIUM 5000 UNITS: 5000 INJECTION INTRAVENOUS; SUBCUTANEOUS at 06:23

## 2025-07-09 RX ADMIN — HYDROCODONE BITARTRATE AND ACETAMINOPHEN 1 TABLET: 5; 325 TABLET ORAL at 02:01

## 2025-07-09 RX ADMIN — ARFORMOTEROL TARTRATE 15 MCG: 15 SOLUTION RESPIRATORY (INHALATION) at 09:59

## 2025-07-09 RX ADMIN — CYANOCOBALAMIN TAB 1000 MCG 1000 MCG: 1000 TAB at 08:43

## 2025-07-09 RX ADMIN — INSULIN LISPRO 2 UNITS: 100 INJECTION, SOLUTION INTRAVENOUS; SUBCUTANEOUS at 22:54

## 2025-07-09 RX ADMIN — HYDROCODONE BITARTRATE AND ACETAMINOPHEN 1 TABLET: 5; 325 TABLET ORAL at 06:23

## 2025-07-09 RX ADMIN — HYDROCODONE BITARTRATE AND ACETAMINOPHEN 1 TABLET: 5; 325 TABLET ORAL at 08:42

## 2025-07-09 RX ADMIN — INSULIN LISPRO 2 UNITS: 100 INJECTION, SOLUTION INTRAVENOUS; SUBCUTANEOUS at 11:57

## 2025-07-09 RX ADMIN — TIZANIDINE 4 MG: 4 TABLET ORAL at 14:30

## 2025-07-09 RX ADMIN — BUDESONIDE 0.5 MG: 0.5 INHALANT RESPIRATORY (INHALATION) at 19:37

## 2025-07-09 RX ADMIN — ARFORMOTEROL TARTRATE 15 MCG: 15 SOLUTION RESPIRATORY (INHALATION) at 19:37

## 2025-07-09 RX ADMIN — Medication 1000 UNITS: at 08:43

## 2025-07-09 RX ADMIN — HYDROCODONE BITARTRATE AND ACETAMINOPHEN 1 TABLET: 5; 325 TABLET ORAL at 17:06

## 2025-07-09 RX ADMIN — FLUTICASONE PROPIONATE 2 SPRAY: 50 SPRAY, METERED NASAL at 08:44

## 2025-07-09 RX ADMIN — PANTOPRAZOLE SODIUM 40 MG: 40 TABLET, DELAYED RELEASE ORAL at 06:23

## 2025-07-09 RX ADMIN — HEPARIN SODIUM 5000 UNITS: 5000 INJECTION INTRAVENOUS; SUBCUTANEOUS at 14:30

## 2025-07-09 RX ADMIN — DULOXETINE 60 MG: 60 CAPSULE, DELAYED RELEASE ORAL at 08:43

## 2025-07-09 RX ADMIN — CETIRIZINE HYDROCHLORIDE 5 MG: 10 TABLET, FILM COATED ORAL at 08:43

## 2025-07-09 RX ADMIN — HEPARIN SODIUM 5000 UNITS: 5000 INJECTION INTRAVENOUS; SUBCUTANEOUS at 20:47

## 2025-07-09 RX ADMIN — HYDROCODONE BITARTRATE AND ACETAMINOPHEN 1 TABLET: 5; 325 TABLET ORAL at 20:47

## 2025-07-09 RX ADMIN — PREGABALIN 75 MG: 75 CAPSULE ORAL at 20:46

## 2025-07-09 RX ADMIN — LEVOTHYROXINE SODIUM 50 MCG: 0.05 TABLET ORAL at 08:43

## 2025-07-09 RX ADMIN — ALLOPURINOL 100 MG: 100 TABLET ORAL at 08:43

## 2025-07-09 RX ADMIN — Medication 1 TABLET: at 08:43

## 2025-07-09 RX ADMIN — BUDESONIDE 0.5 MG: 0.5 INHALANT RESPIRATORY (INHALATION) at 10:06

## 2025-07-09 RX ADMIN — TIZANIDINE 4 MG: 4 TABLET ORAL at 22:53

## 2025-07-09 RX ADMIN — REVEFENACIN 175 MCG: 175 SOLUTION RESPIRATORY (INHALATION) at 10:12

## 2025-07-09 RX ADMIN — PREGABALIN 75 MG: 75 CAPSULE ORAL at 14:30

## 2025-07-09 RX ADMIN — ACETAMINOPHEN 650 MG: 325 TABLET, FILM COATED ORAL at 11:57

## 2025-07-09 RX ADMIN — MONTELUKAST 10 MG: 10 TABLET, FILM COATED ORAL at 20:47

## 2025-07-09 NOTE — THERAPY TREATMENT NOTE
Patient Name: Raquel Mariee  : 1955    MRN: [id number]                              Today's Date: 2025       Admit Date: 2025    Visit Dx: No diagnosis found.  Patient Active Problem List   Diagnosis    Hypogammaglobulinemia    Gastric bypass status for obesity    CKD (chronic kidney disease), stage III    KALYN (obstructive sleep apnea)    Major depressive disorder in partial remission    Hypothyroidism    Fibromyalgia syndrome    Abdominal aortic aneurysm (AAA) without rupture    Nonrheumatic aortic valve stenosis    Vitamin D deficiency, unspecified     Anatomical narrow angle borderline glaucoma    Abnormal finding of blood chemistry, unspecified     Chronic diastolic heart failure    Prinzmetal angina    Common variable agammaglobulinemia    Polymyositis    Port-A-Cath in place    Abnormal CT of the chest    Acute bronchospasm    Acute kidney injury    Sinusitis, chronic    Anemia    Aortic valve stenosis and insufficiency, rheumatic    Body mass index (BMI) of 40.0 to 44.9 in adult    Calcium kidney stones    Choroidal nevus of left eye    CAD (coronary artery disease)    Claw toe, acquired    Combined forms of age-related cataract of both eyes    Depressive disorder    Essential hypertension with goal blood pressure less than 140/90    Severe persistent asthma with exacerbation    Glaucoma suspect of both eyes    Inappropriate sinus tachycardia    Migraine without aura    Mitral valve regurgitation    Osteoarthrosis involving lower leg    Pain in joint involving lower leg    Posterior vitreous detachment of both eyes    Primary osteoarthritis of left wrist    Anxiety disorder    Status post total knee replacement    Sleep disturbances    Senile nuclear sclerosis    Rotator cuff syndrome of left shoulder    Pure hypercholesterolemia    Thoracic aortic aneurysm without rupture    TIA (transient ischemic attack)    Tracheomalacia    Trochlear nerve palsy, left    Diabetes mellitus without  complication    Urge incontinence of urine    Acute exacerbation of chronic obstructive pulmonary disease (COPD)    Internal hemorrhoid, bleeding    Spinal stenosis    Bilateral sacroiliitis     Past Medical History:   Diagnosis Date    Anxiety     Aortic valve stenosis     Cardiac murmur     Cataract, bilateral     CHF (congestive heart failure)     Cholelithiasis GB out    Chronic kidney disease     Stage III    Clostridium difficile infection     in her 30s    Colon polyp Ever since having colonoscopies.    Common variable immunodeficiency     IVIG infusions    Compression fracture of lumbar spine, non-traumatic     COPD (chronic obstructive pulmonary disease)     Coronary artery disease     Prinz metal angina & aortic stenosis    Depression     Diabetes mellitus     type II    Eosinophilic asthma     Fibromyalgia, primary     Full dentures     GERD (gastroesophageal reflux disease)     History of transfusion     in [facility] at [facility].  No reaction noted, patient states she does have an antibody from transfusion.    Hypertension     Hypogammaglobulinemia     Hypothyroidism     Irritable bowel syndrome     Migraines     Morbid obesity     Optic neuritis     Optic neuropathy     Osteoarthritis     Prinzmetal angina 1990    Pseudomonas infection 1998    lungs    PTSD (post-traumatic stress disorder)     Pulmonary edema     Renal insufficiency     Sinus tachycardia 1990    Sleep apnea     no longer uses/needs cpap    Stroke     TIA about 7 years ago    Tachycardia     TIA (transient ischemic attack) 2017    Trochanteric bursitis 01/25/2023     Past Surgical History:   Procedure Laterality Date    APPENDECTOMY      BUNIONECTOMY Bilateral     CARDIAC CATHETERIZATION  2017    no stents needed    CARPAL TUNNEL RELEASE Right     COLONOSCOPY  2021    ENDOMETRIAL ABLATION  1997    EYE SURGERY  ? About 6-8 years ago    Laser for glaucoma    FRACTURE SURGERY  1995    No hardware; Tibeal plateau    GASTRIC BYPASS       HAND SURGERY Left     No hardware    HEMORRHOIDECTOMY N/A 04/09/2024    Procedure: HEMORRHOID BANDING X2;  Surgeon: Melissa Beck MD;  Location: [facility] OR;  Service: General;  Laterality: N/A;    INTERSTIM PLACEMENT N/A 05/03/2023    Procedure: INTERSTIM STAGES 1 AND 2 LEAD AND GENERATOR PLACEMENT;  Surgeon: Abner Nation MD;  Location: [facility] OR;  Service: Urology;  Laterality: N/A;    POSTERIOR VAGINAL REPAIR N/A 08/02/2023    Procedure: POSTERIOR COLPORRHAPHY;  Surgeon: Kellen Galan MD;  Location: [facility] OR;  Service: Gynecology;  Laterality: N/A;    RECTAL EXAMINATION UNDER ANESTHESIA N/A 04/09/2024    Procedure: RECTAL EXAM UNDER ANESTHESIA;  Surgeon: Melissa Beck MD;  Location: [facility] OR;  Service: General;  Laterality: N/A;    REPLACEMENT TOTAL KNEE BILATERAL      TEETH EXTRACTION      all of bottom teeth removed    THORACOTOMY      TONSILLECTOMY      TOTAL ABDOMINAL HYSTERECTOMY WITH SALPINGO OOPHORECTOMY      TRACHEAL SURGERY      Repaired via thoracotomy    TUBAL ABDOMINAL LIGATION  1983    VENOUS ACCESS DEVICE (PORT) INSERTION      port a cath in the left chest wall      General Information       Row Name 07/09/25 1048          Physical Therapy Time and Intention    Document Type therapy note (daily note)  -CK     Mode of Treatment physical therapy;individual therapy  -CK       Row Name 07/09/25 1048          General Information    Patient Profile Reviewed yes  -CK     Existing Precautions/Restrictions spinal;fall;other (see comments)  RLE radicular pain  -CK     Barriers to Rehab medically complex;previous functional deficit  -CK       Row Name 07/09/25 1048          Cognition    Orientation Status (Cognition) oriented x 4  -CK       Row Name 07/09/25 1048          Safety Issues/Impairments Affecting Functional Mobility    Safety Issues Affecting Function (Mobility) insight into deficits/self-awareness;safety precaution awareness  -CK     Impairments Affecting Function  (Mobility) endurance/activity tolerance;pain;postural/trunk control;strength;balance  -CK               User Key  (r) = Recorded By, (t) = Taken By, (c) = Cosigned By      Initials Name Provider Type    Jeanette Kemp PT Physical Therapist                   Mobility       Row Name 07/09/25 1049          Bed Mobility    Bed Mobility supine-sit;sit-supine  -CK     Supine-Sit [place] (Bed Mobility) modified independence  -CK     Sit-Supine [place] (Bed Mobility) modified independence  -CK     Assistive Device (Bed Mobility) head of bed elevated  -CK       Row Name 07/09/25 1049          Sit-Stand Transfer    Sit-Stand [place] (Transfers) standby assist  -CK     Assistive Device (Sit-Stand Transfers) walker, front-wheeled  -CK     Comment, (Sit-Stand Transfer) educated patient on rationale for rollator vs FWW. Patient verbalizes understanding. Demos good safety awareness with FWW  -CK       Row Name 07/09/25 1049          Gait/Stairs (Locomotion)    [place] Level (Gait) contact guard;1 person assist;verbal cues  -CK     Assistive Device (Gait) walker, front-wheeled  -CK     Distance in Feet (Gait) 20  -CK     Deviations/Abnormal Patterns (Gait) right sided deviations;stride length decreased;weight shifting decreased;gait speed decreased;antalgic  -CK     Bilateral Gait Deviations forward flexed posture  -CK     Comment, (Gait/Stairs) Patient ambulated with step to gait pattern with RLE antalgia. She maintained flexed posture as this mildly relieves her RLE radicular pain. Cues to offload RLE with BUE support on walker as needed for pain. Distance limited by increasing RLE pain.  -CK               User Key  (r) = Recorded By, (t) = Taken By, (c) = Cosigned By      Initials Name Provider Type    Jeanette Kemp PT Physical Therapist                   Obj/Interventions       Row Name 07/09/25 1052          Motor Skills    Therapeutic Exercise other (see comments)  single knee to chest x5  each LE, sitting EOB back flexion/extension x5 reps, standing in walker trunk flexion/extension x5 reps  -CK       Row Name 07/09/25 1052          Balance    Balance Assessment sitting static balance;standing static balance;standing dynamic balance  -CK     Static Sitting Balance independent  -CK     Position, Sitting Balance unsupported;sitting edge of bed  -CK     Static Standing Balance standby assist  -CK     Dynamic Standing Balance contact guard  -CK     Position/Device Used, Standing Balance supported;walker, front-wheeled  -CK               User Key  (r) = Recorded By, (t) = Taken By, (c) = Cosigned By      Initials Name Provider Type    CK Jeanette Reynoso, PT Physical Therapist                   Goals/Plan    No documentation.                  Clinical Impression       Row Name 07/09/25 1053          Pain    Pretreatment Pain Rating 5/10  -CK     Posttreatment Pain Rating 5/10  -CK     Pain Location extremity;hip;back  -CK     Pain Side/Orientation right;generalized  -CK     Pain Management Interventions exercise or physical activity utilized;positioning techniques utilized;premedicated for activity;nursing notified  -CK     Response to Pain Interventions activity participation with tolerable pain  -CK       Row Name 07/09/25 1053          Plan of Care Review    Plan of Care Reviewed With patient  -CK     Progress no change  -CK     Outcome Evaluation Patient gave good effort with flexion/extension exercises today in attempt to centralize her radicular symptoms. Patient tolerates well, but remains limited by progressive RLE pain with ambulation. IPPT remains indicated to address current deficits. Will continue with current PT POC.  -CK       Row Name 07/09/25 1053          Vital Signs    Pre Systolic BP Rehab 147  -CK     Pre Treatment Diastolic BP 82  -CK     Pretreatment Heart Rate (beats/min) 86  -CK     Posttreatment Heart Rate (beats/min) 84  -CK     O2 Delivery Pre Treatment room air  -CK     O2  Delivery Intra Treatment room air  -CK     O2 Delivery Post Treatment room air  -CK     Pre Patient Position Supine  -CK     Post Patient Position Supine  -CK       Row Name 07/09/25 1053          Positioning and Restraints    Pre-Treatment Position in bed  -CK     Post Treatment Position bed  -CK     In Bed fowlers;call light within reach;encouraged to call for assist;notified nsg;heels elevated  no alarm upon entry  -CK               User Key  (r) = Recorded By, (t) = Taken By, (c) = Cosigned By      Initials Name Provider Type    Jeanette Kemp, GRIFFIN Physical Therapist                   Outcome Measures       Row Name 07/09/25 1055 07/09/25 0810       How much help from another person do you currently need...    Turning from your back to your side while in flat bed without using bedrails? 4  -CK 4  -BM    Moving from lying on back to sitting on the side of a flat bed without bedrails? 4  -CK 3  -BM    Moving to and from a bed to a chair (including a wheelchair)? 3  -CK 3  -BM    Standing up from a chair using your arms (e.g., wheelchair, bedside chair)? 3  -CK 3  -BM    Climbing 3-5 steps with a railing? 2  -CK 2  -BM    To walk in hospital room? 3  -CK 3  -BM    AM-PAC 6 Clicks Score (PT) 19  -CK 18  -BM    Highest Level of Mobility Goal Walk 10 Steps or More-6  -CK Walk 10 Steps or More-6  -BM      Row Name 07/09/25 1055          Functional Assessment    Outcome Measure Options AM-PAC 6 Clicks Basic Mobility (PT)  -CK               User Key  (r) = Recorded By, (t) = Taken By, (c) = Cosigned By      Initials Name Provider Type    Jeanette Kemp, PT Physical Therapist    BM Karon Matthews, RN Registered Nurse                                 Physical Therapy Education       Title: PT OT SLP Therapies (In Progress)       Topic: Physical Therapy (Done)       Point: Mobility training (Done)       Learning Progress Summary            Patient Acceptance, E, VU by FIFI at 7/9/2025 1055    Acceptance, E, VU  by  at 7/8/2025 1613    Acceptance, E,D, VU,NR by AB at 7/7/2025 1553    Eager, E,TB,D, VU,DU by SC at 7/6/2025 1600    Comment: reviewed HEP    Eager, E, VU by SC at 7/5/2025 1419    Comment: re idwed HEP                      Point: Home exercise program (Done)       Learning Progress Summary            Patient Acceptance, E, VU by  at 7/9/2025 1055    Acceptance, E, VU by  at 7/8/2025 1613    Acceptance, E,D, VU,NR by AB at 7/7/2025 1553    Eager, E,TB,D, VU,DU by SC at 7/6/2025 1600    Comment: reviewed HEP    Eager, E, VU by SC at 7/5/2025 1419    Comment: re idwed HEP                      Point: Body mechanics (Done)       Learning Progress Summary            Patient Acceptance, E, VU by  at 7/9/2025 1055    Acceptance, E, VU by  at 7/8/2025 1613    Acceptance, E,D, VU,NR by AB at 7/7/2025 1553    Eager, E,TB,D, VU,DU by SC at 7/6/2025 1600    Comment: reviewed HEP    Eager, E, VU by SC at 7/5/2025 1419    Comment: re idwed HEP                      Point: Precautions (Done)       Learning Progress Summary            Patient Acceptance, E, VU by  at 7/9/2025 1055    Acceptance, E, VU by  at 7/8/2025 1613    Acceptance, E,D, VU,NR by AB at 7/7/2025 1553    Eager, E,TB,D, VU,DU by SC at 7/6/2025 1600    Comment: reviewed HEP    Eager, E, VU by SC at 7/5/2025 1419    Comment: re idwed HEP                                      User Key       Initials Effective Dates Name Provider Type Discipline    SC 02/03/23 -  Apolinar Butts, PT Physical Therapist PT    AB 09/22/22 -  Kelli Mar, PT Physical Therapist PT     02/06/24 -  Jeanette Reynoso, PT Physical Therapist PT                  PT Recommendation and Plan     Progress: no change  Outcome Evaluation: Patient gave good effort with flexion/extension exercises today in attempt to centralize her radicular symptoms. Patient tolerates well, but remains limited by progressive RLE pain with ambulation. IPPT remains indicated to address current  deficits. Will continue with current PT POC.     Time Calculation:         PT Charges       Row Name 07/09/25 1055             Time Calculation    Start Time 0912  -CK      PT Received On 07/09/25  -CK         Timed Charges    24365 - PT Therapeutic Exercise Minutes 15  -CK      74614 - PT Therapeutic Activity Minutes 11  -CK         Total Minutes    Timed Charges Total Minutes 26  -CK       Total Minutes 26  -CK                User Key  (r) = Recorded By, (t) = Taken By, (c) = Cosigned By      Initials Name Provider Type    CK Jeanette Reynoso, PT Physical Therapist                  Therapy Charges for Today       Code Description Service Date Service Provider Modifiers Qty    50969612576 HC PT THER PROC EA 15 MIN 7/8/2025 Jeanette Reynoso, PT GP 1    55935248012 HC GAIT TRAINING EA 15 MIN 7/8/2025 Jeanette Reynoso, PT GP 1    00475991762 HC PT THER PROC EA 15 MIN 7/9/2025 Jeanette Reynoso, PT GP 1    71157149989 HC PT THERAPEUTIC ACT EA 15 MIN 7/9/2025 Jeanette Reynoso, PT GP 1            PT G-Codes  Outcome Measure Options: AM-PAC 6 Clicks Basic Mobility (PT)  AM-PAC 6 Clicks Score (PT): 19  AM-PAC 6 Clicks Score (OT): 20  PT Discharge Summary  Anticipated Discharge Disposition (PT): inpatient rehabilitation facility    Jeanette Reynoso PT  7/9/2025

## 2025-07-09 NOTE — PLAN OF CARE
Goal Outcome Evaluation:                   Patient is alert and oriented x 4; On room air; Pt is stand-by assist for walking, up to chair, and bedside commode; Pain is being controlled with scheduled oral pain medication, and muscle relaxer and tylenol; IV is CDI; patient is voiding well, last BM was 07/09; patient is waiting for rehab for hopefully Friday transition to there, patient is pleasant.

## 2025-07-09 NOTE — PLAN OF CARE
Goal Outcome Evaluation:  Plan of Care Reviewed With: patient        Progress: no change  Outcome Evaluation: Patient gave good effort with flexion/extension exercises today in attempt to centralize her radicular symptoms. Patient tolerates well, but remains limited by progressive RLE pain with ambulation. IPPT remains indicated to address current deficits. Will continue with current PT POC.    Anticipated Discharge Disposition (PT): inpatient rehabilitation facility

## 2025-07-09 NOTE — DISCHARGE PLACEMENT REQUEST
"Raquel Milan (69 y.o. Female)        [facility] Case Management [phone]       Date of Birth   1955    Social Security Number       Address   [address] [place], [place] [address]    Home Phone   [phone]    MRN   [id number]       Judaism   Pentecostalism    Marital Status                               Admission Date   7/4/2025    Admission Type   Urgent    Admitting Provider   Adolfo Velazco MD    Attending Provider   Adolfo Velazco MD    Department, Room/Bed   [facility] 3G, S361/1       Discharge Date       Discharge Disposition       Discharge Destination                                 Attending Provider: Adolfo Velazco MD    Allergies: Cefaclor, Cephalexin, Cephalosporins, Escitalopram Oxalate, Ambien [Zolpidem Tartrate], Cardizem [Diltiazem Hcl], Metoclopramide, Mobic [Meloxicam], Penicillins, Sumatriptan, Topamax [Topiramate], Verapamil, Zolpidem, Amoxicillin, Edecrin [Ethacrynic Acid], Hydrochlorothiazide, Sulfa Antibiotics    Isolation: None   Infection: None   Code Status: CPR    Ht: 165 cm (64.96\")   Wt: 93 kg (205 lb 0.4 oz)    Admission Cmt: None   Principal Problem: Spinal stenosis [M48.00]                   Active Insurance as of 7/4/2025       Primary Coverage       Payor Plan Insurance Group Employer/Plan Group    AETNA MEDICARE REPLACEMENT AETNA MEDICARE ADVANTAGE HMO [id number]       Payor Plan Address Payor Plan Phone Number Payor Plan Fax Number Effective Dates    [address] [phone]  1/1/2024 - None Entered    [place] [address]         Subscriber Name Subscriber Birth Date Member ID       RAQUEL MILAN 1955 [id number]                     Emergency Contacts        (Rel.) Home Phone Work Phone Mobile Phone    KAYLIPAUL (Spouse) [phone] -- [phone]    JOSEPH AZUL (Daughter) [phone] -- [phone]                 History & Physical        Aguilar Mcgarry MD at 07/04/25 [facility]              [facility] " [facility] Medicine Services  HISTORY AND PHYSICAL    Patient Name: Raquel Mariee  : 1955  MRN: [id number]  Primary Care Physician: Sanjeev Rawls MD  Date of admission: 2025      Subjective  Subjective     Chief Complaint:  Lower back pain    HPI:  Raquel Mariee is a 69 y.o. female with hx asthma, HFpEF, CVID on IVIG monthly, T2DM here with back pain. She has known severe lumbar stenosis following with NSGY and orthopedics. Reports worsening 3d ago when she bent down to  something. Has radicular type pain radiating down right side making it hard to ambulate. Has had one episode of fecal incontinence and known stress urinary incontinence but states this has worsened over past week. Does not report saddle anesthesia or loss of sensation to legs. She was seen at [facility] ED where she was reported to have post void 160cc and decreased rectal tone, with CT showing diffuse DJD of lumbar spine but no cauda equina. MRI was unable to be performed so was transferred here, case was discussed with neurosurgery prior to transfer.      Personal History     Past Medical History:   Diagnosis Date    Anxiety     Aortic valve stenosis     Cardiac murmur     Cataract, bilateral     CHF (congestive heart failure)     Cholelithiasis GB out    Chronic kidney disease     Stage III    Clostridium difficile infection     in her 30s    Colon polyp Ever since having colonoscopies.    Common variable immunodeficiency     IVIG infusions    Compression fracture of lumbar spine, non-traumatic     COPD (chronic obstructive pulmonary disease)     Coronary artery disease     Prinz metal angina & aortic stenosis    Depression     Diabetes mellitus     type II    Eosinophilic asthma     Fibromyalgia, primary     Full dentures     GERD (gastroesophageal reflux disease)     History of transfusion     in  at [facility].  No reaction noted, patient states she does have an antibody from transfusion.     Hypertension     Hypogammaglobulinemia     Hypothyroidism     Irritable bowel syndrome     Migraines     Morbid obesity     Optic neuritis     Optic neuropathy     Osteoarthritis     Prinzmetal angina 1990    Pseudomonas infection 1998    lungs    PTSD (post-traumatic stress disorder)     Pulmonary edema     Renal insufficiency     Sinus tachycardia 1990    Sleep apnea     no longer uses/needs cpap    Stroke     TIA about 7 years ago    Tachycardia     TIA (transient ischemic attack) 2017    Trochanteric bursitis 01/25/2023           Past Surgical History:   Procedure Laterality Date    APPENDECTOMY      BUNIONECTOMY Bilateral     CARDIAC CATHETERIZATION  2017    no stents needed    CARPAL TUNNEL RELEASE Right     COLONOSCOPY  2021    ENDOMETRIAL ABLATION  1997    EYE SURGERY  ? About 6-8 years ago    Laser for glaucoma    FRACTURE SURGERY  1995    No hardware; Tibeal plateau    GASTRIC BYPASS      HAND SURGERY Left     No hardware    HEMORRHOIDECTOMY N/A 04/09/2024    Procedure: HEMORRHOID BANDING X2;  Surgeon: Melissa Beck MD;  Location: [facility] OR;  Service: General;  Laterality: N/A;    INTERSTIM PLACEMENT N/A 05/03/2023    Procedure: INTERSTIM STAGES 1 AND 2 LEAD AND GENERATOR PLACEMENT;  Surgeon: Abner Nation MD;  Location: [facility] OR;  Service: Urology;  Laterality: N/A;    POSTERIOR VAGINAL REPAIR N/A 08/02/2023    Procedure: POSTERIOR COLPORRHAPHY;  Surgeon: Kellen Galan MD;  Location: [facility] OR;  Service: Gynecology;  Laterality: N/A;    RECTAL EXAMINATION UNDER ANESTHESIA N/A 04/09/2024    Procedure: RECTAL EXAM UNDER ANESTHESIA;  Surgeon: Melissa Beck MD;  Location: [facility] OR;  Service: General;  Laterality: N/A;    REPLACEMENT TOTAL KNEE BILATERAL      TEETH EXTRACTION      all of bottom teeth removed    THORACOTOMY      TONSILLECTOMY      TOTAL ABDOMINAL HYSTERECTOMY WITH SALPINGO OOPHORECTOMY      TRACHEAL SURGERY      Repaired via thoracotomy    TUBAL ABDOMINAL LIGATION   1983    VENOUS ACCESS DEVICE (PORT) INSERTION      port a cath in the left chest wall       Family History: family history includes ADD / ADHD in her son; Alcohol abuse in her son; Anxiety disorder in her daughter; Arthritis in her sister; Asthma in her brother, brother, daughter, daughter, father, sister, sister, sister, and son; Bipolar disorder in her daughter; Brain cancer in her sister; Breast cancer in her maternal cousin; COPD in her brother, brother, father, sister, and sister; Cancer in her sister and sister; Dementia in her father; Depression in her daughter, mother, sister, and sister; Diabetes in her brother, brother, father, mother, sister, and sister; Drug abuse in her son; Endometriosis in her daughter, daughter, mother, sister, sister, and sister; Heart attack in her father, sister, and sister; Heart disease in her father, maternal grandfather, maternal uncle, mother, and sister; Hypertension in her brother, daughter, father, mother, sister, and sister; Irritable bowel syndrome in her daughter; Kidney disease in her sister and sister; Kidney failure in her daughter; Mental illness in her daughter; Osteoarthritis in her daughter and daughter; Self-Injurious Behavior  in her daughter; Stroke in her father, mother, and sister; Suicide Attempts in her daughter.     Social History:  reports that she has never smoked. She has never been exposed to tobacco smoke. She has never used smokeless tobacco. She reports current alcohol use. She reports that she does not use drugs.  Social History     Social History Narrative    Not on file       Medications:  Available home medication information reviewed.  Budeson-Glycopyrrol-Formoterol, DULoxetine, Fluticasone Propionate, Hydrocortisone (Perianal), Magnesium, Mepolizumab, Sinus Rinse Kit, Sitz Bath, acetaminophen-codeine, albuterol sulfate HFA, allopurinol, betamethasone valerate, buPROPion SR, calcium carbonate, cholecalciferol, denosumab, diphenhydrAMINE,  estradiol, ferrous sulfate, fexofenadine, fluticasone-salmeterol, furosemide, glucosamine-chondroitin, guaiFENesin-codeine, immune globulin (human), ipratropium, ipratropium-albuterol, isosorbide mononitrate, levothyroxine, lidocaine, linaclotide, montelukast, multivitamin with minerals, omeprazole, ondansetron, potassium citrate, predniSONE, solifenacin, tiZANidine, tiotropium bromide monohydrate, ubrogepant, valsartan, and vitamin B-12    Allergies   Allergen Reactions    Cefaclor Hives and Swelling     Other reaction(s): SWELLING  Shed 4 layers of skin and extremely SOB  Cesario Bishop's syndrome        Cephalexin Other (See Comments)     Cesario-Bishop's syndrome      Cephalosporins Hives, Swelling and Angioedema     Rechallenge with cefipime caused rash on 1/14/08.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    Verapamil Nausea And Vomiting     Dizzy    Zolpidem Other (See Comments) and Unknown (See Comments)     Automatic behaviour in sleep.  confusion    Amoxicillin Rash    Edecrin [Ethacrynic Acid] Rash and Other (See Comments)     Weight gain    Hydrochlorothiazide Hives    Sulfa Antibiotics Hives       Objective  Objective     Vital Signs:   Temp:  [98 °F (36.7 °C)-98.1 °F (36.7 °C)] 98.1 °F (36.7 °C)  Heart Rate:  [81-93] 81  Resp:  [16-18] 16  BP: (139-185)/(78-98) 185/92       Physical Exam   AAOx3   EOMI PERRL  Neck flexion intact  Facial expression intact  Heart RRR  Lungs CTAB  Abd soft, nontender  BL  upper extremity strength intact, symmetric. Pulses symmetric  BL LE without sensory loss. No clonus. 4/5 strength RLE, 5/5 LLE. Patellar reflex 1+ BL. Pulses intact, symmetric    Result Review:  I have personally reviewed the results from the time of this admission to 7/4/2025 22:43 EDT and agree with these findings:  [x]  Laboratory list / accordion  []  Microbiology  [x]  Radiology  [x]  EKG/Telemetry   []  Cardiology/Vascular   []  Pathology  [x]  Old records  []  Other:  Most notable findings include: see A+P      LAB RESULTS:      Lab 07/04/25  1227   WBC 6.81   HEMOGLOBIN 12.1   HEMATOCRIT 35.4   PLATELETS 211   NEUTROS ABS 5.31   IMMATURE GRANS (ABS) 0.04   LYMPHS ABS 0.92   MONOS ABS 0.51   EOS ABS 0.00   MCV 93.7         Lab 07/04/25  1227   SODIUM 127*   POTASSIUM 4.5   CHLORIDE 93*   CO2 22.8   ANION GAP 11.2   BUN 15.0   CREATININE 0.95   EGFR 65.0   GLUCOSE 130*   CALCIUM 8.9         Lab 07/04/25  1227   TOTAL PROTEIN 7.0   ALBUMIN 3.8   GLOBULIN 3.2   ALT (SGPT) 18   AST (SGOT) 30   BILIRUBIN 0.4   ALK PHOS 44                     UA          7/4/2025    13:27   Urinalysis   Squamous Epithelial Cells, UA 3-6    Specific Gravity, UA 1.015    Ketones, UA Negative    Blood, UA Trace    Leukocytes, UA Small (1+)    Nitrite, UA Negative    RBC, UA 0-2    WBC, UA 6-10    Bacteria, UA Trace        Microbiology Results (last 10 days)       ** No results found for the last 240 hours. **            CT Abdomen Pelvis With Contrast  Result Date: 7/4/2025  PROCEDURE: CT ABDOMEN PELVIS W CONTRAST-  HISTORY:  Low back pain, urinary incontinence, fecal incontinence, eval distended bladder or bowel  COMPARISON:  None .  TECHNIQUE: Multiple axial CT images were obtained from the lung bases through the pubic symphysis following the administration of Isovue 300 contrast.  FINDINGS:  ABDOMEN: There is a trace right pleural effusion. There is right pleural thickening. Right middle lobe atelectasis is seen. The heart is  normal in size. There are postsurgical changes of gastric bypass. The liver is normal. Gallbladder surgically absent. The spleen is unremarkable. No adrenal mass is present.  The pancreas is normal. There is a 3 mm nonobstructing left renal stone. The aorta is normal in caliber. There is no free fluid or adenopathy.  A moderate amount of stool is seen in the transverse colon.  PELVIS: The appendix is not identified. There is sigmoid diverticulosis. The uterus is surgically absent. The urinary bladder is unremarkable. There is no significant free fluid or adenopathy.      Impression: Right pleural thickening and right middle lobe atelectasis may be inflammatory. Short interval follow-up chest CT is recommended.  Moderate amount of stool in the transverse colon is consistent with constipation.  Left nephrolithiasis without hydronephrosis. .   CTDI: 22.43 mGy DLP: 1130.99 mGy.cm   This study was performed with techniques to keep radiation doses as low as reasonably achievable (ALARA). Individualized dose reduction techniques using automated exposure control or adjustment of mA and/or kV according to the patient size were employed.      Images were reviewed, interpreted, and dictated by Dr. Kwaku Grant MD Transcribed by Leyda Rachel PA-C.  This report was signed and finalized on 7/4/2025 2:01 PM by Kwaku Grant MD.      CT Lumbar Spine Without Contrast  Result Date: 7/4/2025  PROCEDURE: CT LUMBAR SPINE WO CONTRAST-  HISTORY: Low back pain, urinary incontinence, eval fracture  TECHNIQUE: Multiple axial CT images were obtained through the lumbar spine using bone window algorithms. Coronal and sagittal images were reconstructed from the original axial data set.  FINDINGS: There is spaces are moderately diffusely degenerated. There is anterolisthesis of L4 on L5. There is no acute fracture.  There are disc bulges and facet hypertrophy throughout the lumbar spine. This results in canal stenosis and  neuroforaminal narrowing throughout the lower lumbar spine.          Impression: Degenerative disc disease.  No acute fracture.     DLP: 1130.99 mGy.cm CTDI: 22.43 mGy   This study was performed with techniques to keep radiation doses as low as reasonably achievable (ALARA). Individualized dose reduction techniques using automated exposure control or adjustment of mA and/or kV according to the patient size were employed.     Images were reviewed, interpreted, and dictated by Dr. Kwaku Grant MD Transcribed by Leyda Rachel PA-C.  This report was signed and finalized on 7/4/2025 2:01 PM by Kwaku Grant MD.        Results for orders placed in visit on 03/28/25    Adult Transthoracic Echo Complete W/ Cont if Necessary Per Protocol 03/31/2025  2:42 PM    Interpretation Summary  1.  Normal left ventricular size and systolic function, LVEF 50-55%.  2.  Moderate to severe concentric LVH.  3.  Grade 2 diastolic dysfunction.  4.  Normal right ventricular size and systolic function.  5.  Moderately increased left atrial volume index.  6.  Moderate calcification of the aortic valve with moderate aortic stenosis.  7.  Moderate aortic regurgitation.  8.  Mild mitral regurgitation.      Assessment & Plan  Assessment & Plan       Spinal stenosis    CKD (chronic kidney disease), stage III    KALYN (obstructive sleep apnea)    Hypothyroidism    Chronic diastolic heart failure    Aortic valve stenosis and insufficiency, rheumatic    CAD (coronary artery disease)    Lumbar stenosis  -known severe lumbar stenosis with worsening 3d ago, has had one episode of fecal incontinence and known stress urinary incontinence but states this has worsened over past week. Does not report saddle anesthesia or loss of sensation to legs. Exam findings mainly reveal RLE weakness, although limited by radicular type pain. Unable to get MRI so she was transferred here, NSGY made aware.   -STAT MRIs ordered  -start decadron  -Discussed with on  call NSGY, will let them know once images available    Hyponatremia  -acute on chronic, baseline 132, 133. Currently 127. Looks euvolemic on exam. Unclear etiology at this time. Will hold home diuretic and check TSH, urine studies.    Asthma  -stable, continue home inhalers    HTN  -restart home meds    HFpEF  -stable, holding furosemide 2/2 hyponatremia    T2DM w/ neuropathy  -start ssi. Cotninue duloxetine    CVID  -on chronic IVIG infusions    Hypothyroidism  -synthroid, checking TSH    ALMAZ  -iron supplements    GERD  -ppi    Depression  -c/w home meds        VTE Prophylaxis:  Pharmacologic VTE prophylaxis orders are present.          CODE STATUS:    Code Status and Medical Interventions: CPR (Attempt to Resuscitate); Full Support   Ordered at: 07/04/25 2236     Code Status (Patient has no pulse and is not breathing):    CPR (Attempt to Resuscitate)     Medical Interventions (Patient has pulse or is breathing):    Full Support       Expected Discharge   Expected discharge date/ time has not been documented.     Aguilar Mcgarry MD  07/04/25      Electronically signed by Aguilar Mcgarry MD at 07/04/25 2251       Current Facility-Administered Medications   Medication Dose Route Frequency Provider Last Rate Last Admin    acetaminophen (TYLENOL) tablet 650 mg  650 mg Oral Q4H PRN Aguilar Mcgarry MD        Or    acetaminophen (TYLENOL) 160 MG/5ML oral solution 650 mg  650 mg Oral Q4H PRN Aguilar Mcgarry MD        Or    acetaminophen (TYLENOL) suppository 650 mg  650 mg Rectal Q4H PRN Aguilar Mcgarry MD        albuterol (PROVENTIL) nebulizer solution 0.083% 2.5 mg/3mL  2.5 mg Nebulization Q6H PRN Aguilar Mcgarry MD        allopurinol (ZYLOPRIM) tablet 100 mg  100 mg Oral Daily Aguilar Mcgarry MD   100 mg at 07/09/25 0843    arformoterol (BROVANA) nebulizer solution 15 mcg  15 mcg Nebulization BID - RT Aguilar Mcgarry MD   15 mcg at 07/08/25 2041    And    budesonide (PULMICORT) nebulizer solution 0.5 mg   0.5 mg Nebulization BID - RT Aguilar Mcgarry MD   0.5 mg at 07/08/25 2040    And    revefenacin (YUPELRI) nebulizer solution 175 mcg  175 mcg Nebulization Daily - RT Aguilar Mcgarry MD   175 mcg at 07/08/25 0956    sennosides-docusate (PERICOLACE) 8.6-50 MG per tablet 2 tablet  2 tablet Oral BID PRN Aguilar Mcgarry MD        And    polyethylene glycol (MIRALAX) packet 17 g  17 g Oral Daily PRN Aguilar Mcgarry MD   17 g at 07/08/25 2115    And    bisacodyl (DULCOLAX) EC tablet 5 mg  5 mg Oral Daily PRN Aguilar Mcgarry MD        And    bisacodyl (DULCOLAX) suppository 10 mg  10 mg Rectal Daily PRN Aguilar Mcgarry MD        Calcium Replacement - Follow Nurse / BPA Driven Protocol   Not Applicable PRN Aguilar Mcgarry MD        cetirizine (zyrTEC) tablet 5 mg  5 mg Oral Daily Aguilar Mcgarry MD   5 mg at 07/09/25 0843    cholecalciferol (VITAMIN D3) tablet 1,000 Units  1,000 Units Oral Daily Aguilar Mcgarry MD   1,000 Units at 07/09/25 0843    dextrose (D50W) (25 g/50 mL) IV injection 25 g  25 g Intravenous Q15 Min PRN Aguilar Mcgarry MD        dextrose (GLUTOSE) oral gel 15 g  15 g Oral Q15 Min PRN Aguilar Mcgarry MD        DULoxetine (CYMBALTA) DR capsule 60 mg  60 mg Oral Daily Aguilar Mcgarry MD   60 mg at 07/09/25 0843    ferrous sulfate tablet 325 mg  325 mg Oral Daily With Breakfast Aguilar Mcgarry MD   325 mg at 07/09/25 0843    fluticasone (FLONASE) 50 MCG/ACT nasal spray 2 spray  2 spray Each Nare Daily Aguilar Mcgarry MD   2 spray at 07/09/25 0844    furosemide (LASIX) tablet 40 mg  40 mg Oral Daily PRN Adolfo Velazco MD        glucagon (GLUCAGEN) injection 1 mg  1 mg Intramuscular Q15 Min PRN Aguilar Mcgarry MD        heparin (porcine) 5000 UNIT/ML injection 5,000 Units  5,000 Units Subcutaneous Q8H Aguilar Mcgarry MD   5,000 Units at 07/09/25 0623    HYDROcodone-acetaminophen (NORCO) 5-325 MG per tablet 1 tablet  1 tablet Oral Q4H Sahil Gautam MD   1 tablet at  07/09/25 0842    HYDROmorphone (DILAUDID) injection 0.25 mg  0.25 mg Intravenous Q4H PRN Yuniel Verduzco MD        And    naloxone (NARCAN) injection 0.4 mg  0.4 mg Intravenous Q5 Min PRN Yuniel Verduzco MD        Insulin Lispro (humaLOG) injection 2-7 Units  2-7 Units Subcutaneous 4x Daily AC & at Bedtime Adolfo Velazco MD   2 Units at 07/08/25 1743    isosorbide mononitrate (IMDUR) 24 hr tablet 30 mg  30 mg Oral QAM Aguilar Mcgarry MD   30 mg at 07/09/25 0623    levothyroxine (SYNTHROID, LEVOTHROID) tablet 50 mcg  50 mcg Oral Daily Aguilar Mcgarry MD   50 mcg at 07/09/25 0843    Magnesium Standard Dose Replacement - Follow Nurse / BPA Driven Protocol   Not Applicable PRN Aguilar Mcgarry MD        montelukast (SINGULAIR) tablet 10 mg  10 mg Oral Nightly Aguilar Mcgarry MD   10 mg at 07/08/25 2106    morphine injection 1 mg  1 mg Intravenous Q4H PRN Aguilar Mcgarry MD        And    naloxone (NARCAN) injection 0.4 mg  0.4 mg Intravenous Q5 Min PRN Aguilar Mcgarry MD        multivitamin with minerals 1 tablet  1 tablet Oral Daily Aguilar Mcgarry MD   1 tablet at 07/09/25 0843    ondansetron ODT (ZOFRAN-ODT) disintegrating tablet 4 mg  4 mg Translingual Q6H PRN Lakshmi Larios PA-C   4 mg at 07/05/25 1054    pantoprazole (PROTONIX) EC tablet 40 mg  40 mg Oral Q AM Aguilar Mcgarry MD   40 mg at 07/09/25 0623    Phosphorus Replacement - Follow Nurse / BPA Driven Protocol   Not Applicable PRAguilar Montalvo MD        polyethylene glycol (MIRALAX) packet 17 g  17 g Oral Daily Adolfo Velazco MD        Potassium Replacement - Follow Nurse / BPA Driven Protocol   Not Applicable PRAguilar Montalvo MD        pregabalin (LYRICA) capsule 75 mg  75 mg Oral Q8H Olga Grajeda PA   75 mg at 07/09/25 0623    sodium chloride 0.9 % flush 10 mL  10 mL Intravenous Q12H Aguilar Mcgarry MD   10 mL at 07/09/25 0844    sodium chloride 0.9 % flush 10 mL  10 mL Intravenous PRN Aguilar Mcgarry MD        sodium  chloride 0.9 % infusion 40 mL  40 mL Intravenous PRN Aguilar Mcgarry MD        tiZANidine (ZANAFLEX) tablet 4 mg  4 mg Oral BID PRN Aguilar Mcgarry MD   4 mg at 25    valsartan (DIOVAN) tablet 160 mg  160 mg Oral Daily Aguilar Mcgarry MD   160 mg at 25 0843    vitamin B-12 (CYANOCOBALAMIN) tablet 1,000 mcg  1,000 mcg Oral Daily Aguilar Mcgarry MD   1,000 mcg at 25 0843        Physician Progress Notes (most recent note)        Adolfo Velazco MD at 25 0723              [facility] Medicine Services  PROGRESS NOTE    Patient Name: Raquel Mariee  : 1955  MRN: [id number]    Date of Admission: 2025  Primary Care Physician: Sanjeev Rawls MD    Subjective   Subjective     CC: Follow-up back pain    HPI: Patient continues complain of back pain shooting down her legs    Objective   Objective     Vital Signs:   Temp:  [97.5 °F (36.4 °C)-98.2 °F (36.8 °C)] 98.1 °F (36.7 °C)  Heart Rate:  [73-99] 74  Resp:  [18] 18  BP: (135-166)/() 147/82     Physical Exam:  Constitutional: No acute distress, awake, alert  HENT: NCAT, mucous membranes moist  Respiratory: Clear to auscultation bilaterally, respiratory effort normal   Cardiovascular: RRR, no murmurs, rubs, or gallops  Gastrointestinal: Positive bowel sounds, soft, nontender, nondistended  Musculoskeletal: No bilateral ankle edema  Psychiatric: Appropriate affect, cooperative  Neurologic: Oriented x 3 nonfocal r    Results Reviewed:  LAB RESULTS:      Lab 25  0434 25  1227   WBC 5.49 6.81   HEMOGLOBIN 12.5 12.1   HEMATOCRIT 36.8 35.4   PLATELETS 205 211   NEUTROS ABS  --  5.31   IMMATURE GRANS (ABS)  --  0.04   LYMPHS ABS  --  0.92   MONOS ABS  --  0.51   EOS ABS  --  0.00   MCV 95.8 93.7         Lab 25  0434 25  1227   SODIUM 131* 127*   POTASSIUM 4.4 4.5   CHLORIDE 96* 93*   CO2 23.7 22.8   ANION GAP 11.3 11.2   BUN 10.2 15.0   CREATININE 0.87 0.95   EGFR 72.2 65.0    GLUCOSE 160* 130*   CALCIUM 8.6 8.9   MAGNESIUM 2.0  --    TSH 1.450  --          Lab 07/04/25  1227   TOTAL PROTEIN 7.0   ALBUMIN 3.8   GLOBULIN 3.2   ALT (SGPT) 18   AST (SGOT) 30   BILIRUBIN 0.4   ALK PHOS 44                     Brief Urine Lab Results  (Last result in the past 365 days)        Color   Clarity   Blood   Leuk Est   Nitrite   Protein   CREAT   Urine HCG        07/04/25 1327 Yellow   Clear   Trace   Small (1+)   Negative   Negative                   Microbiology Results Abnormal       None            No radiology results from the last 24 hrs    Results for orders placed in visit on 03/28/25    Adult Transthoracic Echo Complete W/ Cont if Necessary Per Protocol 03/31/2025  2:42 PM    Interpretation Summary  1.  Normal left ventricular size and systolic function, LVEF 50-55%.  2.  Moderate to severe concentric LVH.  3.  Grade 2 diastolic dysfunction.  4.  Normal right ventricular size and systolic function.  5.  Moderately increased left atrial volume index.  6.  Moderate calcification of the aortic valve with moderate aortic stenosis.  7.  Moderate aortic regurgitation.  8.  Mild mitral regurgitation.      Current medications:  Scheduled Meds:allopurinol, 100 mg, Oral, Daily  arformoterol, 15 mcg, Nebulization, BID - RT   And  budesonide, 0.5 mg, Nebulization, BID - RT   And  revefenacin, 175 mcg, Nebulization, Daily - RT  cetirizine, 5 mg, Oral, Daily  cholecalciferol, 1,000 Units, Oral, Daily  DULoxetine, 60 mg, Oral, Daily  ferrous sulfate, 325 mg, Oral, Daily With Breakfast  fluticasone, 2 spray, Each Nare, Daily  heparin (porcine), 5,000 Units, Subcutaneous, Q8H  HYDROcodone-acetaminophen, 1 tablet, Oral, Q4H  insulin lispro, 2-7 Units, Subcutaneous, 4x Daily AC & at Bedtime  isosorbide mononitrate, 30 mg, Oral, QAM  levothyroxine, 50 mcg, Oral, Daily  montelukast, 10 mg, Oral, Nightly  multivitamin with minerals, 1 tablet, Oral, Daily  pantoprazole, 40 mg, Oral, Q AM  polyethylene glycol, 17  g, Oral, Daily  pregabalin, 75 mg, Oral, Q8H  sodium chloride, 10 mL, Intravenous, Q12H  valsartan, 160 mg, Oral, Daily  vitamin B-12, 1,000 mcg, Oral, Daily      Continuous Infusions:   PRN Meds:.  acetaminophen **OR** acetaminophen **OR** acetaminophen    albuterol    senna-docusate sodium **AND** polyethylene glycol **AND** bisacodyl **AND** bisacodyl    Calcium Replacement - Follow Nurse / BPA Driven Protocol    dextrose    dextrose    furosemide    glucagon (human recombinant)    HYDROmorphone **AND** naloxone    Magnesium Standard Dose Replacement - Follow Nurse / BPA Driven Protocol    Morphine **AND** naloxone    ondansetron ODT    Phosphorus Replacement - Follow Nurse / BPA Driven Protocol    Potassium Replacement - Follow Nurse / BPA Driven Protocol    sodium chloride    sodium chloride    tiZANidine    Assessment & Plan   Assessment & Plan     Active Hospital Problems    Diagnosis  POA    **Spinal stenosis [M48.00]  Yes    Bilateral sacroiliitis [M46.1]  Yes    Aortic valve stenosis and insufficiency, rheumatic [I06.2]  Yes    CAD (coronary artery disease) [I25.10]  Yes    Chronic diastolic heart failure [I50.32]  Yes    KALYN (obstructive sleep apnea) [G47.33]  Yes    CKD (chronic kidney disease), stage III [N18.30]  Yes    Hypothyroidism [E03.9]  Yes      Resolved Hospital Problems   No resolved problems to display.        Brief Hospital Course to date:  Raquel Mariee is a 69 y.o. female with history of HFpEF, CVID on IVIg monthly, DMII, Neuropathy, anemia, GERD, depression and hypothyroid who presents with back and radicular pain in the right leg.     Lumbar radiculopathy  - patient seen by Neurosurgery, patient with disc extrusion at L2/3  - continue conservative management, Lyrica increased to 75 mg every 8 hours, seems to be tolerating this well, may need to be titrated up  - PT/OT following, recommend rehab, CM following  - follow up with Neurosurgery in clinic as an outpatient in 2 weeks      Hyponatremia  - sodium 127 at admission, improved to 131   - asymptomatic and appears chronic, serum Osm 281  - Continue as needed Lasix     Asthma  - continue nebs     UTI vs asymptomatic bacteriuria  - culture shows 100K [place], however only 6-10 WBCs and trace bacteria on UA  - afebrile, no leukocytosis, denies dysuria or increased frequency  - multiple drug allergies, will defer antibiotics at present, monitor clinically,      Chronic HFpEF   Hypertension  - Currently seems compensated   -Continue Imdur, valsartan  - Continue home prn Lasix     Well-controlled type 2 diabetes,   Neuropathy  -FSBG's reviewed and appropriate  -Continue SSI     CVID  - on monthly IVIg through her Immunologist     Iron def anemia  -Continue iron supplement     GERD-continue PPI     Depression  -Continue  Cymbalta, she is no longer taking Wellbutrin     Hypothyroidism  - levothyroxine  - TSH 1.4     Expected Discharge Location and Transportation: Inpatient rehab  Expected Discharge   Expected Discharge Date: 7/9/2025; Expected Discharge Time:      VTE Prophylaxis:  Pharmacologic VTE prophylaxis orders are present.     AM-PAC 6 Clicks Score (PT): 18 (07/08/25 2106)    CODE STATUS:   Code Status and Medical Interventions: CPR (Attempt to Resuscitate); Full Support   Ordered at: 07/04/25 2236     Code Status (Patient has no pulse and is not breathing):    CPR (Attempt to Resuscitate)     Medical Interventions (Patient has pulse or is breathing):    Full Support       Adolfo Velazco MD  07/09/25        Electronically signed by Adolfo Velazco MD at 07/09/25 0910          Consult Notes (most recent note)        Lakshmi Larios PA-C at 07/05/25 1048        Consult Orders    1. Inpatient Neurosurgery Consult [[id number]] ordered by Aguilar Mcgarry MD at 07/05/25 0236              Attestation signed by Sahil Gautam MD at 07/06/25 0898      I have reviewed this documentation and agree.                      Reason for  consultation: Lumbar radiculopathy    Referring Physician:  Vahe Butler PA-C     Chief complaint low back pain with radiation into the right lower extremity    Admit Diagnosis:   Spinal stenosis [M48.00]     History of present illness:  This is a 69-year-old female who presented to the [facility] ED yesterday evening with a several day history of worsening low back pain with radiation into the right lower extremity to the point where it was difficult for her to ambulate.  She was also reporting some episodes of fecal incontinence and she does have a history of known stress urinary incontinence but feels that it had gotten worse over the past week.  Patient denied any symptoms of numbness/tingling, or saddle anesthesia.  She does feel weak in her right lower extremity secondary to the pain.  CT scan of the lumbar spine did reveal multilevel degenerative changes throughout.  Neurosurgical consultation was ultimately requested and we recommended MRI of the lumbar spine, however their machine was down.  She was ultimately transferred to our facility to undergo MRI.  During my bedside visit today, she reports right lower extremity pain to the point where it is difficult for her to ambulate.  She denies saddle anesthesia.  She does feel weak in the right lower extremity but reports that this is mostly secondary to her radiating radicular pain.    ROS:  A 12 point review of systems was performed.  All systems reviewed were negative with the exception of those mentioned in the history of present illness.    Past medical history:  Past Medical History:   Diagnosis Date    Anxiety     Aortic valve stenosis     Cardiac murmur     Cataract, bilateral     CHF (congestive heart failure)     Cholelithiasis GB out    Chronic kidney disease     Stage III    Clostridium difficile infection     in her 30s    Colon polyp Ever since having colonoscopies.    Common variable immunodeficiency     IVIG infusions     Compression fracture of lumbar spine, non-traumatic     COPD (chronic obstructive pulmonary disease)     Coronary artery disease     Prinz metal angina & aortic stenosis    Depression     Diabetes mellitus     type II    Eosinophilic asthma     Fibromyalgia, primary     Full dentures     GERD (gastroesophageal reflux disease)     History of transfusion     in 1979 at [facility].  No reaction noted, patient states she does have an antibody from transfusion.    Hypertension     Hypogammaglobulinemia     Hypothyroidism     Irritable bowel syndrome     Migraines     Morbid obesity     Optic neuritis     Optic neuropathy     Osteoarthritis     Prinzmetal angina 1990    Pseudomonas infection 1998    lungs    PTSD (post-traumatic stress disorder)     Pulmonary edema     Renal insufficiency     Sinus tachycardia 1990    Sleep apnea     no longer uses/needs cpap    Stroke     TIA about 7 years ago    Tachycardia     TIA (transient ischemic attack) 2017    Trochanteric bursitis 01/25/2023       Past surgical history:  Past Surgical History:   Procedure Laterality Date    APPENDECTOMY      BUNIONECTOMY Bilateral     CARDIAC CATHETERIZATION  2017    no stents needed    CARPAL TUNNEL RELEASE Right     COLONOSCOPY  2021    ENDOMETRIAL ABLATION  1997    EYE SURGERY  ? About 6-8 years ago    Laser for glaucoma    FRACTURE SURGERY  1995    No hardware; Tibeal plateau    GASTRIC BYPASS      HAND SURGERY Left     No hardware    HEMORRHOIDECTOMY N/A 04/09/2024    Procedure: HEMORRHOID BANDING X2;  Surgeon: Melissa Beck MD;  Location: [facility] OR;  Service: General;  Laterality: N/A;    INTERSTIM PLACEMENT N/A 05/03/2023    Procedure: INTERSTIM STAGES 1 AND 2 LEAD AND GENERATOR PLACEMENT;  Surgeon: Abner Nation MD;  Location: [facility] OR;  Service: Urology;  Laterality: N/A;    POSTERIOR VAGINAL REPAIR N/A 08/02/2023    Procedure: POSTERIOR COLPORRHAPHY;  Surgeon: Kellen Galan MD;  Location: [facility] OR;   Service: Gynecology;  Laterality: N/A;    RECTAL EXAMINATION UNDER ANESTHESIA N/A 04/09/2024    Procedure: RECTAL EXAM UNDER ANESTHESIA;  Surgeon: Melissa Beck MD;  Location: [facility];  Service: General;  Laterality: N/A;    REPLACEMENT TOTAL KNEE BILATERAL      TEETH EXTRACTION      all of bottom teeth removed    THORACOTOMY      TONSILLECTOMY      TOTAL ABDOMINAL HYSTERECTOMY WITH SALPINGO OOPHORECTOMY      TRACHEAL SURGERY      Repaired via thoracotomy    TUBAL ABDOMINAL LIGATION  1983    VENOUS ACCESS DEVICE (PORT) INSERTION      port a cath in the left chest wall       Social history:  Social History     Socioeconomic History    Marital status:    Tobacco Use    Smoking status: Never     Passive exposure: Never    Smokeless tobacco: Never   Vaping Use    Vaping status: Never Used   Substance and Sexual Activity    Alcohol use: Yes     Comment: ONCE A YEAR    Drug use: Never    Sexual activity: Defer       Family history:  Family History   Problem Relation Age of Onset    Diabetes Mother     Stroke Mother     Heart disease Mother     Hypertension Mother     Endometriosis Mother     Depression Mother     Diabetes Father     Hypertension Father     Stroke Father     Heart attack Father     Asthma Father     COPD Father     Dementia Father     Heart disease Father     COPD Sister     Asthma Sister     Cancer Sister         Nasal cell skin    Brain cancer Sister     Diabetes Sister     Stroke Sister     Heart attack Sister     Endometriosis Sister     Depression Sister     Arthritis Sister         Rheumatoid    Hypertension Sister     Kidney disease Sister     Diabetes Sister     COPD Sister     Asthma Sister     Endometriosis Sister     Depression Sister     Cancer Sister         Glioma    Heart disease Sister         MI    Heart attack Sister     Endometriosis Sister     Asthma Sister     Hypertension Sister     Kidney disease Sister         ESRD    COPD Brother     Asthma Brother         Turned into  COPD    Diabetes Brother     Asthma Brother     COPD Brother     Diabetes Brother     Hypertension Brother     Asthma Daughter     Endometriosis Daughter     Osteoarthritis Daughter     Hypertension Daughter     Kidney failure Daughter     Irritable bowel syndrome Daughter     Anxiety disorder Daughter     Bipolar disorder Daughter     Depression Daughter     Self-Injurious Behavior  Daughter     Suicide Attempts Daughter     Mental illness Daughter         Bipolar and eating fisorder    Asthma Daughter     Endometriosis Daughter     Osteoarthritis Daughter     Asthma Son     ADD / ADHD Son     Alcohol abuse Son     Drug abuse Son     Breast cancer Maternal Cousin     Heart disease Maternal Grandfather     Heart disease Maternal Uncle     OCD Neg Hx     Paranoid behavior Neg Hx     Schizophrenia Neg Hx     Seizures Neg Hx     Ovarian cancer Neg Hx        Allergies:  Allergies   Allergen Reactions    Cefaclor Hives and Swelling     Other reaction(s): SWELLING  Shed 4 layers of skin and extremely SOB  Cesario Bishop's syndrome        Cephalexin Other (See Comments)     Cesario-Bishop's syndrome      Cephalosporins Hives, Swelling and Angioedema     Rechallenge with cefipime caused rash on 1/14/08.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    Verapamil Nausea And Vomiting     Dizzy    Zolpidem Other (See Comments) and Unknown (See Comments)     Automatic behaviour in sleep.  confusion    Amoxicillin Rash    Edecrin  [Ethacrynic Acid] Rash and Other (See Comments)     Weight gain    Hydrochlorothiazide Hives    Sulfa Antibiotics Hives       Outpatient medications:  Scheduled Meds:allopurinol, 100 mg, Oral, Daily  arformoterol, 15 mcg, Nebulization, BID - RT   And  budesonide, 0.5 mg, Nebulization, BID - RT   And  revefenacin, 175 mcg, Nebulization, Daily - RT  buPROPion SR, 150 mg, Oral, BID  cetirizine, 5 mg, Oral, Daily  cholecalciferol, 1,000 Units, Oral, Daily  dexAMETHasone, 4 mg, Intravenous, Q6H  DULoxetine, 60 mg, Oral, Daily  ferrous sulfate, 325 mg, Oral, Daily With Breakfast  fluticasone, 2 spray, Each Nare, Daily  heparin (porcine), 5,000 Units, Subcutaneous, Q8H  insulin regular, 2-7 Units, Subcutaneous, Q6H  isosorbide mononitrate, 30 mg, Oral, QAM  levothyroxine, 50 mcg, Oral, Daily  montelukast, 10 mg, Oral, Nightly  multivitamin with minerals, 1 tablet, Oral, Daily  pantoprazole, 40 mg, Oral, Q AM  pregabalin, 75 mg, Oral, BID  sodium chloride, 10 mL, Intravenous, Q12H  valsartan, 160 mg, Oral, Daily  vitamin B-12, 1,000 mcg, Oral, Daily      Continuous Infusions:   PRN Meds:.  acetaminophen **OR** acetaminophen **OR** acetaminophen    albuterol    senna-docusate sodium **AND** polyethylene glycol **AND** bisacodyl **AND** bisacodyl    Calcium Replacement - Follow Nurse / BPA Driven Protocol    dextrose    dextrose    [Held by provider] furosemide    glucagon (human recombinant)    HYDROmorphone **AND** naloxone    Magnesium Standard Dose Replacement - Follow Nurse / BPA Driven Protocol    Morphine **AND** naloxone    ondansetron ODT    Phosphorus Replacement - Follow Nurse / BPA Driven Protocol    Potassium Replacement - Follow Nurse / BPA Driven Protocol    sodium chloride    sodium chloride    tiZANidine    Physical Examination:  General: Awake, alert, pleasant and conversant.  No acute distress.  Vitals:    07/05/25 1128   BP: 158/84   Pulse: 88   Resp:    Temp:    SpO2: 96%     Systolic (24hrs), Avg:160 ,  Min:149 , Max:185     Diastolic (24hrs), Av, Min:78, Max:98    Pulse  Av.9  Min: 75  Max: 93    Temp (24hrs), Av.9 °F (36.6 °C), Min:97.5 °F (36.4 °C), Max:98.1 °F (36.7 °C)      Neurological: Cranial nerves II through XII are grossly intact.  Proprioception intact.  Sensation is intact to light touch throughout.  She reports radicular pain that radiates from her low back down into her right thigh stopping at the knee.  Musculoskeletal: 5 out of 5 strength both proximally and distally in all 4 extremities but patient does report pain with movement in the right lower extremity.  Vascular: 2+ radial pulses bilaterally.  Cardiovascular: Regular rate and rhythm.  Extremities: No clubbing, cyanosis, or edema.  Pulmonary: Normal effort and excursion.  No evidence of respiratory distress.  Psychiatric: Normal mood and affect  HEENT: Normocephalic, atraumatic.  Eyes: No scleral icterus.  Extraocular eye movements are intact, and pupils are equal, round, and reactive to light.    Imaging:  All imaging has been reviewed and independently interpreted.  MRIs of the lumbar thoracic and cervical spine reviewed along with CT scan of the lumbar spine.  There are multilevel degenerative changes throughout with no evidence of significant canal stenosis.  I did have for my comparison a recent study from 2025 and do not appreciate much in the way of change.  At L2-3 there is a large broad-based disc bulge eccentric to the right contributing to moderate borderline severe bilateral neuroforaminal narrowing.      Assessment and Plan:    Spinal stenosis [M48.00]     This is a 69-year-old female with a several week history of worsening low back pain with radiation into the right lower extremity, consistent with lumbar radiculopathy.  Her imaging reveals multilevel degenerative changes throughout, worse at the L2-3 level where there is a right paracentral disc extrusion contributing to moderate borderline severe right  lateral recess stenosis.  She does have multiple medical comorbidities and is somewhat of a high risk surgical candidate.  There was concern for cauda equina syndrome, however I would like to rule that out today.  She may be a candidate for a lumbar fusion in the future but considering her multiple medical comorbidities I would like to exhaust all conservative treatments prior to considering neurosurgical intervention.  I am going to start her on some Lyrica to help with her radiating leg pain.  At some point in the future, I would consider ordering a lumbar myelogram to further assess the degree of stenosis.  I would like for her to work with physical therapy as well.  We can lift n.p.o. and she can eat.  Continue pain control.  Had a lengthy and protracted conversation with the patient and her  at the bedside this morning.  There is currently no role during this hospitalization for urgent or emergent neurosurgical intervention.  Once we are able to get her pain under control, then I would like to establish outpatient follow-up.  All questions answered at the bedside.  Appreciate the consult.    Lakshmi Larios PA-C        Electronically signed by Sahil Guatam MD at 25 0826          Physical Therapy Notes (most recent note)        Jeanette Reynoso PT at 25 1442  Version 1 of 1         Patient Name: Raquel Mariee  : 1955    MRN: [id number]                              Today's Date: 2025       Admit Date: 2025    Visit Dx: No diagnosis found.  Patient Active Problem List   Diagnosis    Hypogammaglobulinemia    Gastric bypass status for obesity    CKD (chronic kidney disease), stage III    KALYN (obstructive sleep apnea)    Major depressive disorder in partial remission    Hypothyroidism    Fibromyalgia syndrome    Abdominal aortic aneurysm (AAA) without rupture    Nonrheumatic aortic valve stenosis    Vitamin D deficiency, unspecified     Anatomical narrow angle  borderline glaucoma    Abnormal finding of blood chemistry, unspecified     Chronic diastolic heart failure    Prinzmetal angina    Common variable agammaglobulinemia    Polymyositis    Port-A-Cath in place    Abnormal CT of the chest    Acute bronchospasm    Acute kidney injury    Sinusitis, chronic    Anemia    Aortic valve stenosis and insufficiency, rheumatic    Body mass index (BMI) of 40.0 to 44.9 in adult    Calcium kidney stones    Choroidal nevus of left eye    CAD (coronary artery disease)    Claw toe, acquired    Combined forms of age-related cataract of both eyes    Depressive disorder    Essential hypertension with goal blood pressure less than 140/90    Severe persistent asthma with exacerbation    Glaucoma suspect of both eyes    Inappropriate sinus tachycardia    Migraine without aura    Mitral valve regurgitation    Osteoarthrosis involving lower leg    Pain in joint involving lower leg    Posterior vitreous detachment of both eyes    Primary osteoarthritis of left wrist    Anxiety disorder    Status post total knee replacement    Sleep disturbances    Senile nuclear sclerosis    Rotator cuff syndrome of left shoulder    Pure hypercholesterolemia    Thoracic aortic aneurysm without rupture    TIA (transient ischemic attack)    Tracheomalacia    Trochlear nerve palsy, left    Diabetes mellitus without complication    Urge incontinence of urine    Acute exacerbation of chronic obstructive pulmonary disease (COPD)    Internal hemorrhoid, bleeding    Spinal stenosis    Bilateral sacroiliitis     Past Medical History:   Diagnosis Date    Anxiety     Aortic valve stenosis     Cardiac murmur     Cataract, bilateral     CHF (congestive heart failure)     Cholelithiasis GB out    Chronic kidney disease     Stage III    Clostridium difficile infection     in her 30s    Colon polyp Ever since having colonoscopies.    Common variable immunodeficiency     IVIG infusions    Compression fracture of lumbar spine,  non-traumatic     COPD (chronic obstructive pulmonary disease)     Coronary artery disease     Prinz metal angina & aortic stenosis    Depression     Diabetes mellitus     type II    Eosinophilic asthma     Fibromyalgia, primary     Full dentures     GERD (gastroesophageal reflux disease)     History of transfusion     in 1979 at [facility].  No reaction noted, patient states she does have an antibody from transfusion.    Hypertension     Hypogammaglobulinemia     Hypothyroidism     Irritable bowel syndrome     Migraines     Morbid obesity     Optic neuritis     Optic neuropathy     Osteoarthritis     Prinzmetal angina 1990    Pseudomonas infection 1998    lungs    PTSD (post-traumatic stress disorder)     Pulmonary edema     Renal insufficiency     Sinus tachycardia 1990    Sleep apnea     no longer uses/needs cpap    Stroke     TIA about 7 years ago    Tachycardia     TIA (transient ischemic attack) 2017    Trochanteric bursitis 01/25/2023     Past Surgical History:   Procedure Laterality Date    APPENDECTOMY      BUNIONECTOMY Bilateral     CARDIAC CATHETERIZATION  2017    no stents needed    CARPAL TUNNEL RELEASE Right     COLONOSCOPY  2021    ENDOMETRIAL ABLATION  1997    EYE SURGERY  ? About 6-8 years ago    Laser for glaucoma    FRACTURE SURGERY  1995    No hardware; Tibeal plateau    GASTRIC BYPASS      HAND SURGERY Left     No hardware    HEMORRHOIDECTOMY N/A 04/09/2024    Procedure: HEMORRHOID BANDING X2;  Surgeon: Melissa Beck MD;  Location: [facility] OR;  Service: General;  Laterality: N/A;    INTERSTIM PLACEMENT N/A 05/03/2023    Procedure: INTERSTIM STAGES 1 AND 2 LEAD AND GENERATOR PLACEMENT;  Surgeon: Abner Nation MD;  Location: [facility] OR;  Service: Urology;  Laterality: N/A;    POSTERIOR VAGINAL REPAIR N/A 08/02/2023    Procedure: POSTERIOR COLPORRHAPHY;  Surgeon: Kellen Galan MD;  Location:  [facility] OR;  Service: Gynecology;  Laterality: N/A;    RECTAL EXAMINATION  UNDER ANESTHESIA N/A 04/09/2024    Procedure: RECTAL EXAM UNDER ANESTHESIA;  Surgeon: Melissa Beck MD;  Location: [facility];  Service: General;  Laterality: N/A;    REPLACEMENT TOTAL KNEE BILATERAL      TEETH EXTRACTION      all of bottom teeth removed    THORACOTOMY      TONSILLECTOMY      TOTAL ABDOMINAL HYSTERECTOMY WITH SALPINGO OOPHORECTOMY      TRACHEAL SURGERY      Repaired via thoracotomy    TUBAL ABDOMINAL LIGATION  1983    VENOUS ACCESS DEVICE (PORT) INSERTION      port a cath in the left chest wall      General Information       Row Name 07/08/25 1530          Physical Therapy Time and Intention    Document Type therapy note (daily note)  -CK     Mode of Treatment physical therapy;individual therapy  -CK       Row Name 07/08/25 1530          General Information    Patient Profile Reviewed yes  -CK     Existing Precautions/Restrictions spinal;fall;other (see comments)  RLE pain  -CK     Barriers to Rehab medically complex;previous functional deficit  -CK       Row Name 07/08/25 1530          Cognition    Orientation Status (Cognition) oriented x 4  -CK       Row Name 07/08/25 1530          Safety Issues/Impairments Affecting Functional Mobility    Safety Issues Affecting Function (Mobility) insight into deficits/self-awareness;safety precaution awareness  -CK     Impairments Affecting Function (Mobility) endurance/activity tolerance;pain;postural/trunk control;strength;balance  -CK               User Key  (r) = Recorded By, (t) = Taken By, (c) = Cosigned By      Initials Name Provider Type    CK Jeanette Reynoso, PT Physical Therapist                   Mobility       Row Name 07/08/25 1531          Bed Mobility    Bed Mobility sit-supine  -CK     Sit-Supine [place] (Bed Mobility) standby assist  -CK     Assistive Device (Bed Mobility) leg   -CK     Comment, (Bed Mobility) reviewed logroll technique for comfort  -CK       Row Name 07/08/25 1531          Sit-Stand Transfer    Sit-Stand  [place] (Transfers) standby assist  -CK     Assistive Device (Sit-Stand Transfers) walker, 4-wheeled  -CK     Comment, (Sit-Stand Transfer) reviewed safety awareness with rollator including using brakes prior to sitting/standing and still pushing up from chair. Patient able to demonstrate good safety awareness  -CK       Row Name 07/08/25 1531          Gait/Stairs (Locomotion)    [place] Level (Gait) contact guard;1 person assist;verbal cues  -CK     Assistive Device (Gait) walker, 4-wheeled  -CK     Patient was able to Ambulate yes  -CK     Distance in Feet (Gait) 10  +15  -CK     Deviations/Abnormal Patterns (Gait) right sided deviations;stride length decreased;weight shifting decreased;gait speed decreased;antalgic  -CK     Bilateral Gait Deviations forward flexed posture  -CK     Right Sided Gait Deviations weight shift ability decreased  -CK     Comment, (Gait/Stairs) Patient ambulated with step to gait pattern with decreased weight acceptance on RLE. She began to use rollator to offload RLE due to pain and PT prompted patient to take seated rest before ambulating back to chair. Education provided regarding safety with FWW as opposed to rollator since she is using AD to offload her RLE. Also educated patient that she needs to avoid pushing through rollator if she is going to use rollator at home.  -CK               User Key  (r) = Recorded By, (t) = Taken By, (c) = Cosigned By      Initials Name Provider Type    CK Jeanette Reynoso, PT Physical Therapist                   Obj/Interventions       Row Name 07/08/25 1602          Motor Skills    Therapeutic Exercise other (see comments)  supine unilateral knee to chest x5 then bilateral knee to chest x3. BKFO x5 each in hooklying  -CK       Row Name 07/08/25 1602          Balance    Balance Assessment sitting static balance;standing static balance;standing dynamic balance  -CK     Static Sitting Balance supervision  -CK     Position, Sitting Balance  unsupported;sitting edge of bed;sitting in chair  -CK     Static Standing Balance contact guard  -CK     Dynamic Standing Balance contact guard  -CK     Position/Device Used, Standing Balance supported;walker, 4-wheeled  -CK               User Key  (r) = Recorded By, (t) = Taken By, (c) = Cosigned By      Initials Name Provider Type    CK Jeanette Reynoso, PT Physical Therapist                   Goals/Plan    No documentation.                  Clinical Impression       Row Name 07/08/25 1610          Pain    Pain Location extremity;hip  -CK     Pain Side/Orientation right;lower  -CK     Pain Management Interventions exercise or physical activity utilized;positioning techniques utilized  -CK     Response to Pain Interventions activity participation with increased pain  -CK     Additional Documentation Pain Scale: FACES Pre/Post-Treatment (Group)  -CK       Row Name 07/08/25 1610          Pain Scale: FACES Pre/Post-Treatment    Pain: FACES Scale, Pretreatment 2-->hurts little bit  -CK     Posttreatment Pain Rating 4-->hurts little more  -CK       Row Name 07/08/25 1610          Plan of Care Review    Plan of Care Reviewed With patient  -CK     Progress no change  -CK     Outcome Evaluation Patient remains limtied by pain in RLE with mobility. Trialed rollator today at patient's request and she was able to ambulate 10'+15' CGA with seated rest on rollator. Educated patient that she is safer to use FWW as she utilizes AD to push on and offload her RLE. IPPT remains indicated to address current deficits. Will continue with current PT POC.  -CK       Row Name 07/08/25 1610          Vital Signs    Pre Systolic BP Rehab 169  -CK     Pre Treatment Diastolic BP 94  -CK     Pretreatment Heart Rate (beats/min) 82  -CK     O2 Delivery Pre Treatment room air  -CK     O2 Delivery Intra Treatment room air  -CK     O2 Delivery Post Treatment room air  -CK     Pre Patient Position Sitting  -CK     Post Patient Position Supine  -CK        Row Name 07/08/25 1610          Positioning and Restraints    Pre-Treatment Position sitting in chair/recliner  -CK     Post Treatment Position bed  -CK     In Bed fowlers;call light within reach;encouraged to call for assist;notified nsg;heels elevated  alarm unchanged  -CK               User Key  (r) = Recorded By, (t) = Taken By, (c) = Cosigned By      Initials Name Provider Type    Jeanette Kemp, PT Physical Therapist                   Outcome Measures       Row Name 07/08/25 1613 07/08/25 0815       How much help from another person do you currently need...    Turning from your back to your side while in flat bed without using bedrails? 4  -CK 4  -MK    Moving from lying on back to sitting on the side of a flat bed without bedrails? 3  -CK 3  -MK    Moving to and from a bed to a chair (including a wheelchair)? 3  -CK 3  -MK    Standing up from a chair using your arms (e.g., wheelchair, bedside chair)? 3  -CK 3  -MK    Climbing 3-5 steps with a railing? 2  -CK 2  -MK    To walk in hospital room? 3  -CK 3  -MK    AM-PAC 6 Clicks Score (PT) 18  -CK 18  -MK    Highest Level of Mobility Goal Walk 10 Steps or More-6  -CK Walk 10 Steps or More-6  -MK      Row Name 07/08/25 1613 07/08/25 1004       Functional Assessment    Outcome Measure Options AM-PAC 6 Clicks Basic Mobility (PT)  -CK AM-PAC 6 Clicks Daily Activity (OT)  -AJ              User Key  (r) = Recorded By, (t) = Taken By, (c) = Cosigned By      Initials Name Provider Type    Dulce Newman, RN Registered Nurse    Jeanette Kemp, PT Physical Therapist    Stephanie Simon OT Occupational Therapist                                 Physical Therapy Education       Title: PT OT SLP Therapies (In Progress)       Topic: Physical Therapy (Done)       Point: Mobility training (Done)       Learning Progress Summary            Patient Acceptance, E, VU by CK at 7/8/2025 1613    Acceptance, E,D, VU,NR by AB at 7/7/2025 1553    Eager,  E,TB,D, VU,DU by SC at 7/6/2025 1600    Comment: reviewed HEP    Eager, E, VU by SC at 7/5/2025 1419    Comment: re idwed HEP                      Point: Home exercise program (Done)       Learning Progress Summary            Patient Acceptance, E, VU by  at 7/8/2025 1613    Acceptance, E,D, VU,NR by AB at 7/7/2025 1553    Eager, E,TB,D, VU,DU by SC at 7/6/2025 1600    Comment: reviewed HEP    Eager, E, VU by SC at 7/5/2025 1419    Comment: re idwed HEP                      Point: Body mechanics (Done)       Learning Progress Summary            Patient Acceptance, E, VU by  at 7/8/2025 1613    Acceptance, E,D, VU,NR by AB at 7/7/2025 1553    Eager, E,TB,D, VU,DU by SC at 7/6/2025 1600    Comment: reviewed HEP    Eager, E, VU by SC at 7/5/2025 1419    Comment: re idwed HEP                      Point: Precautions (Done)       Learning Progress Summary            Patient Acceptance, E, VU by  at 7/8/2025 1613    Acceptance, E,D, VU,NR by AB at 7/7/2025 1553    Eager, E,TB,D, VU,DU by SC at 7/6/2025 1600    Comment: reviewed HEP    Eager, E, VU by SC at 7/5/2025 1419    Comment: re idwed HEP                                      User Key       Initials Effective Dates Name Provider Type Discipline    SC 02/03/23 -  Apolinar Butts, PT Physical Therapist PT    AB 09/22/22 -  Kelli Mar, PT Physical Therapist PT     02/06/24 -  Jeanette Reynoso, PT Physical Therapist PT                  PT Recommendation and Plan     Progress: no change  Outcome Evaluation: Patient remains limtied by pain in RLE with mobility. Trialed rollator today at patient's request and she was able to ambulate 10'+15' CGA with seated rest on rollator. Educated patient that she is safer to use FWW as she utilizes AD to push on and offload her RLE. IPPT remains indicated to address current deficits. Will continue with current PT POC.     Time Calculation:         PT Charges       Row Name 07/08/25 7274             Time Calculation     Start Time 1442  -CK      PT Received On 25  -CK         Timed Charges    29894 - PT Therapeutic Exercise Minutes 10  -CK      96403 - Gait Training Minutes  13  -CK         Total Minutes    Timed Charges Total Minutes 23  -CK       Total Minutes 23  -CK                User Key  (r) = Recorded By, (t) = Taken By, (c) = Cosigned By      Initials Name Provider Type    CK Jeanette Reynoso, PT Physical Therapist                  Therapy Charges for Today       Code Description Service Date Service Provider Modifiers Qty    33461656932 HC PT THER PROC EA 15 MIN 2025 Jeanette Reynoso, PT GP 1    [id number] HC GAIT TRAINING EA 15 MIN 2025 Jeanette Reynoso, PT GP 1            PT G-Codes  Outcome Measure Options: AM-PAC 6 Clicks Basic Mobility (PT)  AM-PAC 6 Clicks Score (PT): 18  AM-PAC 6 Clicks Score (OT): 20  PT Discharge Summary  Anticipated Discharge Disposition (PT): inpatient rehabilitation facility    Jeanette Reynoso PT  2025      Electronically signed by Jeanette Reynoso PT at 25 1614          Occupational Therapy Notes (most recent note)        Stephanie Quarles, OT at 25 0920          Patient Name: Raquel Mariee  : 1955    MRN: [id number]                              Today's Date: 2025       Admit Date: 2025    Visit Dx: No diagnosis found.  Patient Active Problem List   Diagnosis    Hypogammaglobulinemia    Gastric bypass status for obesity    CKD (chronic kidney disease), stage III    KALYN (obstructive sleep apnea)    Major depressive disorder in partial remission    Hypothyroidism    Fibromyalgia syndrome    Abdominal aortic aneurysm (AAA) without rupture    Nonrheumatic aortic valve stenosis    Vitamin D deficiency, unspecified     Anatomical narrow angle borderline glaucoma    Abnormal finding of blood chemistry, unspecified     Chronic diastolic heart failure    Prinzmetal angina    Common variable agammaglobulinemia    Polymyositis    Port-A-Cath  in place    Abnormal CT of the chest    Acute bronchospasm    Acute kidney injury    Sinusitis, chronic    Anemia    Aortic valve stenosis and insufficiency, rheumatic    Body mass index (BMI) of 40.0 to 44.9 in adult    Calcium kidney stones    Choroidal nevus of left eye    CAD (coronary artery disease)    Claw toe, acquired    Combined forms of age-related cataract of both eyes    Depressive disorder    Essential hypertension with goal blood pressure less than 140/90    Severe persistent asthma with exacerbation    Glaucoma suspect of both eyes    Inappropriate sinus tachycardia    Migraine without aura    Mitral valve regurgitation    Osteoarthrosis involving lower leg    Pain in joint involving lower leg    Posterior vitreous detachment of both eyes    Primary osteoarthritis of left wrist    Anxiety disorder    Status post total knee replacement    Sleep disturbances    Senile nuclear sclerosis    Rotator cuff syndrome of left shoulder    Pure hypercholesterolemia    Thoracic aortic aneurysm without rupture    TIA (transient ischemic attack)    Tracheomalacia    Trochlear nerve palsy, left    Diabetes mellitus without complication    Urge incontinence of urine    Acute exacerbation of chronic obstructive pulmonary disease (COPD)    Internal hemorrhoid, bleeding    Spinal stenosis    Bilateral sacroiliitis     Past Medical History:   Diagnosis Date    Anxiety     Aortic valve stenosis     Cardiac murmur     Cataract, bilateral     CHF (congestive heart failure)     Cholelithiasis GB out    Chronic kidney disease     Stage III    Clostridium difficile infection     in her 30s    Colon polyp Ever since having colonoscopies.    Common variable immunodeficiency     IVIG infusions    Compression fracture of lumbar spine, non-traumatic     COPD (chronic obstructive pulmonary disease)     Coronary artery disease     Prinz metal angina & aortic stenosis    Depression     Diabetes mellitus     type II    Eosinophilic  asthma     Fibromyalgia, primary     Full dentures     GERD (gastroesophageal reflux disease)     History of transfusion     in [facility] at [facility].  No reaction noted, patient states she does have an antibody from transfusion.    Hypertension     Hypogammaglobulinemia     Hypothyroidism     Irritable bowel syndrome     Migraines     Morbid obesity     Optic neuritis     Optic neuropathy     Osteoarthritis     Prinzmetal angina 1990    Pseudomonas infection 1998    lungs    PTSD (post-traumatic stress disorder)     Pulmonary edema     Renal insufficiency     Sinus tachycardia 1990    Sleep apnea     no longer uses/needs cpap    Stroke     TIA about 7 years ago    Tachycardia     TIA (transient ischemic attack) 2017    Trochanteric bursitis 01/25/2023     Past Surgical History:   Procedure Laterality Date    APPENDECTOMY      BUNIONECTOMY Bilateral     CARDIAC CATHETERIZATION  2017    no stents needed    CARPAL TUNNEL RELEASE Right     COLONOSCOPY  2021    ENDOMETRIAL ABLATION  1997    EYE SURGERY  ? About 6-8 years ago    Laser for glaucoma    FRACTURE SURGERY  1995    No hardware; Tibeal plateau    GASTRIC BYPASS      HAND SURGERY Left     No hardware    HEMORRHOIDECTOMY N/A 04/09/2024    Procedure: HEMORRHOID BANDING X2;  Surgeon: Melissa Beck MD;  Location: [facility] OR;  Service: General;  Laterality: N/A;    INTERSTIM PLACEMENT N/A 05/03/2023    Procedure: INTERSTIM STAGES 1 AND 2 LEAD AND GENERATOR PLACEMENT;  Surgeon: Abner Nation MD;  Location: [facility] OR;  Service: Urology;  Laterality: N/A;    POSTERIOR VAGINAL REPAIR N/A 08/02/2023    Procedure: POSTERIOR COLPORRHAPHY;  Surgeon: Kellen Galan MD;  Location:  [facility] OR;  Service: Gynecology;  Laterality: N/A;    RECTAL EXAMINATION UNDER ANESTHESIA N/A 04/09/2024    Procedure: RECTAL EXAM UNDER ANESTHESIA;  Surgeon: Melissa Beck MD;  Location: [facility] OR;  Service: General;  Laterality: N/A;    REPLACEMENT TOTAL KNEE  BILATERAL      TEETH EXTRACTION      all of bottom teeth removed    THORACOTOMY      TONSILLECTOMY      TOTAL ABDOMINAL HYSTERECTOMY WITH SALPINGO OOPHORECTOMY      TRACHEAL SURGERY      Repaired via thoracotomy    TUBAL ABDOMINAL LIGATION  1983    VENOUS ACCESS DEVICE (PORT) INSERTION      port a cath in the left chest wall      General Information       Row Name 07/08/25 0948          OT Time and Intention    Document Type therapy note (daily note)  -     Mode of Treatment occupational therapy  -       Row Name 07/08/25 0948          General Information    Patient Profile Reviewed yes  -     Existing Precautions/Restrictions spinal;fall;other (see comments)  RLE pain  -     Barriers to Rehab medically complex;previous functional deficit  -       Row Name 07/08/25 0948          Cognition    Orientation Status (Cognition) oriented x 4  -       Row Name 07/08/25 0948          Safety Issues/Impairments Affecting Functional Mobility    Impairments Affecting Function (Mobility) endurance/activity tolerance;pain;postural/trunk control;strength;balance  -               User Key  (r) = Recorded By, (t) = Taken By, (c) = Cosigned By      Initials Name Provider Type    AJ Stephanie Quarles OT Occupational Therapist                     Mobility/ADL's       Row Name 07/08/25 0949          Bed Mobility    Comment, (Bed Mobility) Sitting EOB upon OT arrival  -       Row Name 07/08/25 0949          Transfers    Transfers sit-stand transfer;stand-sit transfer;toilet transfer  -       Row Name 07/08/25 0949          Sit-Stand Transfer    Sit-Stand [place] (Transfers) contact guard;1 person assist  -     Assistive Device (Sit-Stand Transfers) walker, front-wheeled  -HILARIO     Comment, (Sit-Stand Transfer) Progressed to SBA  -       Row Name 07/08/25 0949          Toilet Transfer    Type (Toilet Transfer) sit-stand;stand-sit  -AJ     [place] Level (Toilet Transfer) standby assist;1 person assist  -AJ      Assistive Device (Toilet Transfer) commode, bedside without drop arms;walker, front-wheeled  -     Comment, (Toilet Transfer) [place] [facility] [place]ing  -       Row Name 07/08/25 0949          Functional Mobility    Functional Mobility- Ind. Level standby assist  -AJ     Functional Mobility- Device walker, front-wheeled  -     Functional Mobility-Distance (Feet) --  To and from bathroom  -       Row Name 07/08/25 0949          Activities of Daily Living    BADL Assessment/Intervention upper body dressing;lower body dressing;grooming;bathing  -       Row Name 07/08/25 0949          Hygiene Care    Oral Care teeth brushed - regular toothbrush  -       Row Name 07/08/25 0949          Bathing Assessment/Intervention    [place] Level (Bathing) upper body;upper extremities;chest/trunk;set up  -     Position (Bathing) unsupported sitting  -       Row Name 07/08/25 0949          Lower Body Dressing Assessment/Training    [place] Level (Lower Body Dressing) don;doff;socks;standby assist  -     Position (Lower Body Dressing) edge of bed sitting  -     Comment, (Lower Body Dressing) Pt has sock aid at home that she utilizes as needed, able to achieve figure 4 today to don/doff.  -       Row Name 07/08/25 0949          Upper Body Dressing Assessment/Training    [place] Level (Upper Body Dressing) don;doff;front opening garment;set up  -AJ     Position (Upper Body Dressing) unsupported sitting  -       Row Name 07/08/25 0949          Grooming Assessment/Training    [place] Level (Grooming) hair care, combing/brushing;oral care regimen;wash face, hands;standby assist  -AJ     Position (Grooming) supported standing;unsupported sitting  -AJ     Comment, (Grooming) Pt alterated sitting and standing for tasks at sink. Stood for oral care and washing face, sat for hair care and UB bathing.  -AJ               User Key  (r) = Recorded By, (t) = Taken By, (c) = Cosigned By       Initials Name Provider Type    Stephanie Simon OT Occupational Therapist                   Obj/Interventions       Row Name 07/08/25 0955          Balance    Balance Assessment sitting static balance;sitting dynamic balance;sit to stand dynamic balance;standing static balance;standing dynamic balance  -     Static Sitting Balance supervision  -     Dynamic Sitting Balance standby assist  -AJ     Position, Sitting Balance unsupported;sitting edge of bed;other (see comments)  BSC  -     Static Standing Balance contact guard  -     Dynamic Standing Balance contact guard;1-person assist  -AJ     Position/Device Used, Standing Balance supported;walker, front-wheeled  -     Balance Interventions sitting;standing;sit to stand;supported;static;dynamic;occupation based/functional task  -               User Key  (r) = Recorded By, (t) = Taken By, (c) = Cosigned By      Initials Name Provider Type    Stephanie Simon OT Occupational Therapist                   Goals/Plan    No documentation.                  Clinical Impression       Row Name 07/08/25 0955          Pain Assessment    Pretreatment Pain Rating 3/10  -     Posttreatment Pain Rating 4/10  -     Pain Location back  -AJ     Pain Side/Orientation right;lower  -     Pain Management Interventions activity modification encouraged;exercise or physical activity utilized;positioning techniques utilized;premedicated for activity  -     Response to Pain Interventions activity participation with tolerable pain  -     Pre/Posttreatment Pain Comment 8/10 while standing/walking  -       Row Name 07/08/25 0955          Plan of Care Review    Plan of Care Reviewed With patient  -AJ     Progress improving  -     Outcome Evaluation Pt progressed with activity tolerance and independence for ADLs. Pt ambulated to sink for grooming and UB bathing, alternating between sitting and standing for each task to manage pain. Re-educated on body  mechanics/adaptive techniques and use of AE to decrease pain with activity and ADLs. Pt would benefit from ongoing skilled OT services to progress to PLOF. Rec d/c to IRF.  -       Row Name 07/08/25 0955          Therapy Plan Review/Discharge Plan (OT)    Anticipated Discharge Disposition (OT) inpatient rehabilitation facility  -       Row Name 07/08/25 0955          Vital Signs    Pre Systolic BP Rehab 149  -AJ     Pre Treatment Diastolic BP 84  -AJ     Pretreatment Heart Rate (beats/min) 90  -AJ     Posttreatment Heart Rate (beats/min) 90  -AJ     O2 Delivery Pre Treatment room air  -AJ     Post SpO2 (%) 96  -AJ     O2 Delivery Post Treatment room air  -AJ     Pre Patient Position Sitting  -AJ     Intra Patient Position Standing  -AJ     Post Patient Position Sitting  -       Row Name 07/08/25 0955          Positioning and Restraints    Pre-Treatment Position in bed  -AJ     Post Treatment Position chair  -AJ     In Chair notified nsg;reclined;sitting;call light within reach;encouraged to call for assist;heels elevated;waffle cushion;legs elevated  alarm status unchanged  -AJ               User Key  (r) = Recorded By, (t) = Taken By, (c) = Cosigned By      Initials Name Provider Type    Stephanie Simon, OT Occupational Therapist                   Outcome Measures       Row Name 07/08/25 1004          How much help from another is currently needed...    Putting on and taking off regular lower body clothing? 3  -AJ     Bathing (including washing, rinsing, and drying) 3  -AJ     Toileting (which includes using toilet bed pan or urinal) 4  -AJ     Putting on and taking off regular upper body clothing 3  -AJ     Taking care of personal grooming (such as brushing teeth) 3  -AJ     Eating meals 4  -AJ     AM-PAC 6 Clicks Score (OT) 20  -AJ       Row Name 07/08/25 0815          How much help from another person do you currently need...    Turning from your back to your side while in flat bed without using  bedrails? 4  -MK     Moving from lying on back to sitting on the side of a flat bed without bedrails? 3  -MK     Moving to and from a bed to a chair (including a wheelchair)? 3  -MK     Standing up from a chair using your arms (e.g., wheelchair, bedside chair)? 3  -MK     Climbing 3-5 steps with a railing? 2  -MK     To walk in hospital room? 3  -MK     AM-PAC 6 Clicks Score (PT) 18  -MK     Highest Level of Mobility Goal Walk 10 Steps or More-6  -MK       Row Name 07/08/25 1004          Functional Assessment    Outcome Measure Options AM-PAC 6 Clicks Daily Activity (OT)  -HILARIO               User Key  (r) = Recorded By, (t) = Taken By, (c) = Cosigned By      Initials Name Provider Type    Dulce Newman, RN Registered Nurse    Stephanie Simon OT Occupational Therapist                    Occupational Therapy Education       Title: PT OT SLP Therapies (In Progress)       Topic: Occupational Therapy (In Progress)       Point: ADL training (In Progress)       Learning Progress Summary            Patient Acceptance, E,D, NR by  at 7/5/2025 1618                      Point: Precautions (In Progress)       Learning Progress Summary            Patient Acceptance, E,D, NR by  at 7/5/2025 1618                      Point: Body mechanics (In Progress)       Learning Progress Summary            Patient Acceptance, E,D, NR by  at 7/5/2025 1618                                      User Key       Initials Effective Dates Name Provider Type [facility] 04/16/25 -  Nehemiah Platt OT Occupational Therapist OT                  OT Recommendation and Plan     Plan of Care Review  Plan of Care Reviewed With: patient  Progress: improving  Outcome Evaluation: Pt progressed with activity tolerance and independence for ADLs. Pt ambulated to sink for grooming and UB bathing, alternating between sitting and standing for each task to manage pain. Re-educated on body mechanics/adaptive techniques and use of AE to decrease  pain with activity and ADLs. Pt would benefit from ongoing skilled OT services to progress to PLOF. Rec d/c to IRF.     Time Calculation:         Time Calculation- OT       Row Name 07/08/25 1005             Time Calculation- OT    OT Start Time 0920  -AJ      OT Received On 07/08/25  -AJ      OT Goal Re-Cert Due Date 07/15/25  -         Timed Charges    26131 - OT Therapeutic Activity Minutes 5  -AJ      26554 - OT Self Care/Mgmt Minutes 20  -AJ         Total Minutes    Timed Charges Total Minutes 25  -AJ       Total Minutes 25  -AJ                User Key  (r) = Recorded By, (t) = Taken By, (c) = Cosigned By      Initials Name Provider Type    AJ Stephanie Quarles OT Occupational Therapist                  Therapy Charges for Today       Code Description Service Date Service Provider Modifiers Qty    82887403947 HC OT THERAPEUTIC ACT EA 15 MIN 7/8/2025 Stephanie Quarles OT GO 1    75758672083 HC OT SELF CARE/MGMT/TRAIN EA 15 MIN 7/8/2025 Stephanie Quarles OT GO 1                 Stephanie Quarles OT  7/8/2025    Electronically signed by Stephanie Quarles OT at 07/08/25 1006

## 2025-07-09 NOTE — PROGRESS NOTES
[facility] Medicine Services  PROGRESS NOTE    Patient Name: Raquel Mariee  : 1955  MRN: [id number]    Date of Admission: 2025  Primary Care Physician: Sanjeev Rawls MD    Subjective   Subjective     CC: Follow-up back pain    HPI: Patient continues complain of back pain shooting down her legs    Objective   Objective     Vital Signs:   Temp:  [97.5 °F (36.4 °C)-98.2 °F (36.8 °C)] 98.1 °F (36.7 °C)  Heart Rate:  [73-99] 74  Resp:  [18] 18  BP: (135-166)/() 147/82     Physical Exam:  Constitutional: No acute distress, awake, alert  HENT: NCAT, mucous membranes moist  Respiratory: Clear to auscultation bilaterally, respiratory effort normal   Cardiovascular: RRR, no murmurs, rubs, or gallops  Gastrointestinal: Positive bowel sounds, soft, nontender, nondistended  Musculoskeletal: No bilateral ankle edema  Psychiatric: Appropriate affect, cooperative  Neurologic: Oriented x 3 nonfocal r    Results Reviewed:  LAB RESULTS:      Lab 25  0434 25  1227   WBC 5.49 6.81   HEMOGLOBIN 12.5 12.1   HEMATOCRIT 36.8 35.4   PLATELETS 205 211   NEUTROS ABS  --  5.31   IMMATURE GRANS (ABS)  --  0.04   LYMPHS ABS  --  0.92   MONOS ABS  --  0.51   EOS ABS  --  0.00   MCV 95.8 93.7         Lab 25  0434 25  1227   SODIUM 131* 127*   POTASSIUM 4.4 4.5   CHLORIDE 96* 93*   CO2 23.7 22.8   ANION GAP 11.3 11.2   BUN 10.2 15.0   CREATININE 0.87 0.95   EGFR 72.2 65.0   GLUCOSE 160* 130*   CALCIUM 8.6 8.9   MAGNESIUM 2.0  --    TSH 1.450  --          Lab 25  1227   TOTAL PROTEIN 7.0   ALBUMIN 3.8   GLOBULIN 3.2   ALT (SGPT) 18   AST (SGOT) 30   BILIRUBIN 0.4   ALK PHOS 44                     Brief Urine Lab Results  (Last result in the past 365 days)        Color   Clarity   Blood   Leuk Est   Nitrite   Protein   CREAT   Urine HCG        25 1327 Yellow   Clear   Trace   Small (1+)   Negative   Negative                   Microbiology Results Abnormal        None            No radiology results from the last 24 hrs    Results for orders placed in visit on 03/28/25    Adult Transthoracic Echo Complete W/ Cont if Necessary Per Protocol 03/31/2025  2:42 PM    Interpretation Summary  1.  Normal left ventricular size and systolic function, LVEF 50-55%.  2.  Moderate to severe concentric LVH.  3.  Grade 2 diastolic dysfunction.  4.  Normal right ventricular size and systolic function.  5.  Moderately increased left atrial volume index.  6.  Moderate calcification of the aortic valve with moderate aortic stenosis.  7.  Moderate aortic regurgitation.  8.  Mild mitral regurgitation.      Current medications:  Scheduled Meds:allopurinol, 100 mg, Oral, Daily  arformoterol, 15 mcg, Nebulization, BID - RT   And  budesonide, 0.5 mg, Nebulization, BID - RT   And  revefenacin, 175 mcg, Nebulization, Daily - RT  cetirizine, 5 mg, Oral, Daily  cholecalciferol, 1,000 Units, Oral, Daily  DULoxetine, 60 mg, Oral, Daily  ferrous sulfate, 325 mg, Oral, Daily With Breakfast  fluticasone, 2 spray, Each Nare, Daily  heparin (porcine), 5,000 Units, Subcutaneous, Q8H  HYDROcodone-acetaminophen, 1 tablet, Oral, Q4H  insulin lispro, 2-7 Units, Subcutaneous, 4x Daily AC & at Bedtime  isosorbide mononitrate, 30 mg, Oral, QAM  levothyroxine, 50 mcg, Oral, Daily  montelukast, 10 mg, Oral, Nightly  multivitamin with minerals, 1 tablet, Oral, Daily  pantoprazole, 40 mg, Oral, Q AM  polyethylene glycol, 17 g, Oral, Daily  pregabalin, 75 mg, Oral, Q8H  sodium chloride, 10 mL, Intravenous, Q12H  valsartan, 160 mg, Oral, Daily  vitamin B-12, 1,000 mcg, Oral, Daily      Continuous Infusions:   PRN Meds:.  acetaminophen **OR** acetaminophen **OR** acetaminophen    albuterol    senna-docusate sodium **AND** polyethylene glycol **AND** bisacodyl **AND** bisacodyl    Calcium Replacement - Follow Nurse / BPA Driven Protocol    dextrose    dextrose    furosemide    glucagon (human recombinant)    HYDROmorphone  **AND** naloxone    Magnesium Standard Dose Replacement - Follow Nurse / BPA Driven Protocol    Morphine **AND** naloxone    ondansetron ODT    Phosphorus Replacement - Follow Nurse / BPA Driven Protocol    Potassium Replacement - Follow Nurse / BPA Driven Protocol    sodium chloride    sodium chloride    tiZANidine    Assessment & Plan   Assessment & Plan     Active Hospital Problems    Diagnosis  POA    **Spinal stenosis [M48.00]  Yes    Bilateral sacroiliitis [M46.1]  Yes    Aortic valve stenosis and insufficiency, rheumatic [I06.2]  Yes    CAD (coronary artery disease) [I25.10]  Yes    Chronic diastolic heart failure [I50.32]  Yes    KALYN (obstructive sleep apnea) [G47.33]  Yes    CKD (chronic kidney disease), stage III [N18.30]  Yes    Hypothyroidism [E03.9]  Yes      Resolved Hospital Problems   No resolved problems to display.        Brief Hospital Course to date:  Raquel Mariee is a 69 y.o. female with history of HFpEF, CVID on IVIg monthly, DMII, Neuropathy, anemia, GERD, depression and hypothyroid who presents with back and radicular pain in the right leg.     Lumbar radiculopathy  - patient seen by Neurosurgery, patient with disc extrusion at L2/3  - continue conservative management, Lyrica increased to 75 mg every 8 hours, seems to be tolerating this well, may need to be titrated up  - PT/OT following, recommend rehab, CM following  - follow up with Neurosurgery in clinic as an outpatient in 2 weeks     Hyponatremia  - sodium 127 at admission, improved to 131   - asymptomatic and appears chronic, serum Osm 281  - Continue as needed Lasix     Asthma  - continue nebs     UTI vs asymptomatic bacteriuria  - culture shows 100K Kle[place]se[place], however only 6-10 WBCs and trace bacteria on UA  - afebrile, no leukocytosis, denies dysuria or increased frequency  - multiple drug allergies, will defer antibiotics at present, monitor clinically,      Chronic HFpEF   Hypertension  - Currently seems compensated    -Continue Imdur, valsartan  - Continue home prn Lasix     Well-controlled type 2 diabetes,   Neuropathy  -FSBG's reviewed and appropriate  -Continue SSI     CVID  - on monthly IVIg through her Immunologist     Iron def anemia  -Continue iron supplement     GERD-continue PPI     Depression  -Continue  Cymbalta, she is no longer taking Wellbutrin     Hypothyroidism  - levothyroxine  - TSH 1.4     Expected Discharge Location and Transportation: Inpatient rehab  Expected Discharge   Expected Discharge Date: 7/9/2025; Expected Discharge Time:      VTE Prophylaxis:  Pharmacologic VTE prophylaxis orders are present.     AM-PAC 6 Clicks Score (PT): 18 (07/08/25 2106)    CODE STATUS:   Code Status and Medical Interventions: CPR (Attempt to Resuscitate); Full Support   Ordered at: 07/04/25 3388     Code Status (Patient has no pulse and is not breathing):    CPR (Attempt to Resuscitate)     Medical Interventions (Patient has pulse or is breathing):    Full Support       Adolfo Velazco MD  07/09/25

## 2025-07-09 NOTE — PLAN OF CARE
Goal Outcome Evaluation:  Plan of Care Reviewed With: patient        Progress: no change        VSS on room air. NSR on tele. Pt has slept off and on throughout the shift. Scheduled pain meds given as ordered. PRN zanaflex given as ordered. Up independently to BSC; voiding spontaneously; moderate BM this am. Referral made to the [facility] [place] and pt awaiting acceptance.

## 2025-07-09 NOTE — CASE MANAGEMENT/SOCIAL WORK
Continued Stay Note  [facility]     Patient Name: Raquel Mariee  MRN: [id number]  Today's Date: 7/9/2025    Admit Date: 7/4/2025    Plan: Rehab   Discharge Plan       Row Name 07/09/25 0928       Plan    Plan Rehab    Patient/Family in Agreement with Plan yes    Plan Comments Patient's plan is rehab at discharge. CM is unable to reach admissions coordinator at [facility]. Referrals faxed to [facility], [facility], and [facility]. Patient will need precertification for insurance approval once rehab bed is secured. CM will continue to follow and assist with discharge planning.    14:00 EDT  Patient's plan is now skilled rehab at [facility], pending insurance approval. Per Yan Keller will initiate pre-certification today. CM will continue to follow.     Final Discharge Disposition Code 03 - skilled nursing facility (SNF)                   Discharge Codes    No documentation.                 Expected Discharge Date and Time       Expected Discharge Date Expected Discharge Time    Jul 9, 2025               Vincent Enriquez RN

## 2025-07-10 LAB
GLUCOSE BLDC GLUCOMTR-MCNC: 136 MG/DL (ref 70–130)
GLUCOSE BLDC GLUCOMTR-MCNC: 137 MG/DL (ref 70–130)
GLUCOSE BLDC GLUCOMTR-MCNC: 147 MG/DL (ref 70–130)
GLUCOSE BLDC GLUCOMTR-MCNC: 156 MG/DL (ref 70–130)

## 2025-07-10 PROCEDURE — 97116 GAIT TRAINING THERAPY: CPT

## 2025-07-10 PROCEDURE — 97110 THERAPEUTIC EXERCISES: CPT

## 2025-07-10 PROCEDURE — 94664 DEMO&/EVAL PT USE INHALER: CPT

## 2025-07-10 PROCEDURE — 25010000002 HEPARIN (PORCINE) PER 1000 UNITS: Performed by: STUDENT IN AN ORGANIZED HEALTH CARE EDUCATION/TRAINING PROGRAM

## 2025-07-10 PROCEDURE — 99232 SBSQ HOSP IP/OBS MODERATE 35: CPT | Performed by: INTERNAL MEDICINE

## 2025-07-10 PROCEDURE — 63710000001 REVEFENACIN 175 MCG/3ML SOLUTION: Performed by: STUDENT IN AN ORGANIZED HEALTH CARE EDUCATION/TRAINING PROGRAM

## 2025-07-10 PROCEDURE — 63710000001 INSULIN LISPRO (HUMAN) PER 5 UNITS: Performed by: INTERNAL MEDICINE

## 2025-07-10 PROCEDURE — 82948 REAGENT STRIP/BLOOD GLUCOSE: CPT

## 2025-07-10 PROCEDURE — 94799 UNLISTED PULMONARY SVC/PX: CPT

## 2025-07-10 RX ORDER — HYDRALAZINE HYDROCHLORIDE 20 MG/ML
20 INJECTION INTRAMUSCULAR; INTRAVENOUS EVERY 6 HOURS PRN
Status: DISCONTINUED | OUTPATIENT
Start: 2025-07-10 | End: 2025-07-11 | Stop reason: HOSPADM

## 2025-07-10 RX ORDER — PREGABALIN 100 MG/1
100 CAPSULE ORAL EVERY 8 HOURS SCHEDULED
Status: DISCONTINUED | OUTPATIENT
Start: 2025-07-10 | End: 2025-07-11 | Stop reason: HOSPADM

## 2025-07-10 RX ADMIN — Medication 10 ML: at 08:56

## 2025-07-10 RX ADMIN — TIZANIDINE 4 MG: 4 TABLET ORAL at 21:10

## 2025-07-10 RX ADMIN — LEVOTHYROXINE SODIUM 50 MCG: 0.05 TABLET ORAL at 08:50

## 2025-07-10 RX ADMIN — HYDROCODONE BITARTRATE AND ACETAMINOPHEN 1 TABLET: 5; 325 TABLET ORAL at 21:05

## 2025-07-10 RX ADMIN — PREGABALIN 75 MG: 75 CAPSULE ORAL at 06:09

## 2025-07-10 RX ADMIN — HYDROCODONE BITARTRATE AND ACETAMINOPHEN 1 TABLET: 5; 325 TABLET ORAL at 06:09

## 2025-07-10 RX ADMIN — HYDROCODONE BITARTRATE AND ACETAMINOPHEN 1 TABLET: 5; 325 TABLET ORAL at 14:11

## 2025-07-10 RX ADMIN — FERROUS SULFATE TAB 325 MG (65 MG ELEMENTAL FE) 325 MG: 325 (65 FE) TAB at 08:50

## 2025-07-10 RX ADMIN — MONTELUKAST 10 MG: 10 TABLET, FILM COATED ORAL at 21:06

## 2025-07-10 RX ADMIN — INSULIN LISPRO 2 UNITS: 100 INJECTION, SOLUTION INTRAVENOUS; SUBCUTANEOUS at 21:05

## 2025-07-10 RX ADMIN — FLUTICASONE PROPIONATE 2 SPRAY: 50 SPRAY, METERED NASAL at 08:52

## 2025-07-10 RX ADMIN — CETIRIZINE HYDROCHLORIDE 5 MG: 10 TABLET, FILM COATED ORAL at 08:50

## 2025-07-10 RX ADMIN — POLYETHYLENE GLYCOL 3350 17 G: 17 POWDER, FOR SOLUTION ORAL at 08:55

## 2025-07-10 RX ADMIN — ISOSORBIDE MONONITRATE 30 MG: 30 TABLET, EXTENDED RELEASE ORAL at 06:09

## 2025-07-10 RX ADMIN — HYDROCODONE BITARTRATE AND ACETAMINOPHEN 1 TABLET: 5; 325 TABLET ORAL at 17:30

## 2025-07-10 RX ADMIN — PREGABALIN 100 MG: 100 CAPSULE ORAL at 14:11

## 2025-07-10 RX ADMIN — Medication 1000 UNITS: at 08:50

## 2025-07-10 RX ADMIN — BUDESONIDE 0.5 MG: 0.5 INHALANT RESPIRATORY (INHALATION) at 19:25

## 2025-07-10 RX ADMIN — ARFORMOTEROL TARTRATE 15 MCG: 15 SOLUTION RESPIRATORY (INHALATION) at 08:53

## 2025-07-10 RX ADMIN — HYDROCODONE BITARTRATE AND ACETAMINOPHEN 1 TABLET: 5; 325 TABLET ORAL at 08:50

## 2025-07-10 RX ADMIN — DULOXETINE 60 MG: 60 CAPSULE, DELAYED RELEASE ORAL at 08:50

## 2025-07-10 RX ADMIN — VALSARTAN 160 MG: 160 TABLET, FILM COATED ORAL at 08:50

## 2025-07-10 RX ADMIN — Medication 1 TABLET: at 08:50

## 2025-07-10 RX ADMIN — REVEFENACIN 175 MCG: 175 SOLUTION RESPIRATORY (INHALATION) at 08:53

## 2025-07-10 RX ADMIN — PREGABALIN 100 MG: 100 CAPSULE ORAL at 21:06

## 2025-07-10 RX ADMIN — PANTOPRAZOLE SODIUM 40 MG: 40 TABLET, DELAYED RELEASE ORAL at 06:09

## 2025-07-10 RX ADMIN — HYDROCODONE BITARTRATE AND ACETAMINOPHEN 1 TABLET: 5; 325 TABLET ORAL at 01:30

## 2025-07-10 RX ADMIN — ALLOPURINOL 100 MG: 100 TABLET ORAL at 08:50

## 2025-07-10 RX ADMIN — ARFORMOTEROL TARTRATE 15 MCG: 15 SOLUTION RESPIRATORY (INHALATION) at 19:25

## 2025-07-10 RX ADMIN — CYANOCOBALAMIN TAB 1000 MCG 1000 MCG: 1000 TAB at 08:50

## 2025-07-10 RX ADMIN — BUDESONIDE 0.5 MG: 0.5 INHALANT RESPIRATORY (INHALATION) at 09:03

## 2025-07-10 RX ADMIN — HEPARIN SODIUM 5000 UNITS: 5000 INJECTION INTRAVENOUS; SUBCUTANEOUS at 06:09

## 2025-07-10 RX ADMIN — Medication 10 ML: at 21:06

## 2025-07-10 NOTE — PROGRESS NOTES
[facility] Medicine Services  PROGRESS NOTE    Patient Name: Raquel Mariee  : 1955  MRN: [id number]    Date of Admission: 2025  Primary Care Physician: Sanjeev Rawls MD    Subjective   Subjective     CC: Follow-up back pain    HPI: No acute events overnight, patient rested well, continues to have some shooting pain down, right leg but is much improved      Objective   Objective     Vital Signs:   Temp:  [97.9 °F (36.6 °C)-98.3 °F (36.8 °C)] 98.1 °F (36.7 °C)  Heart Rate:  [80-91] 90  Resp:  [16-18] 16  BP: (123-169)/(61-87) 169/87     Physical Exam:  Constitutional: No acute distress, awake, alert  HENT: NCAT, mucous membranes moist  Respiratory: Clear to auscultation bilaterally, respiratory effort normal   Cardiovascular: RRR, no murmurs, rubs, or gallops  Gastrointestinal: Positive bowel sounds, soft, nontender, nondistended  Musculoskeletal: No bilateral ankle edema  Psychiatric: Appropriate affect, cooperative  Neurologic: Oriented x 3, nonfocal  Skin: No rashes      Results Reviewed:  LAB RESULTS:      Lab 25  0434 25  1227   WBC 5.49 6.81   HEMOGLOBIN 12.5 12.1   HEMATOCRIT 36.8 35.4   PLATELETS 205 211   NEUTROS ABS  --  5.31   IMMATURE GRANS (ABS)  --  0.04   LYMPHS ABS  --  0.92   MONOS ABS  --  0.51   EOS ABS  --  0.00   MCV 95.8 93.7         Lab 25  0434 25  1227   SODIUM 131* 127*   POTASSIUM 4.4 4.5   CHLORIDE 96* 93*   CO2 23.7 22.8   ANION GAP 11.3 11.2   BUN 10.2 15.0   CREATININE 0.87 0.95   EGFR 72.2 65.0   GLUCOSE 160* 130*   CALCIUM 8.6 8.9   MAGNESIUM 2.0  --    TSH 1.450  --          Lab 25  1227   TOTAL PROTEIN 7.0   ALBUMIN 3.8   GLOBULIN 3.2   ALT (SGPT) 18   AST (SGOT) 30   BILIRUBIN 0.4   ALK PHOS 44                     Brief Urine Lab Results  (Last result in the past 365 days)        Color   Clarity   Blood   Leuk Est   Nitrite   Protein   CREAT   Urine HCG        25 1327 Yellow   Clear   Trace   Small  (1+)   Negative   Negative                   Microbiology Results Abnormal       None            No radiology results from the last 24 hrs    Results for orders placed in visit on 03/28/25    Adult Transthoracic Echo Complete W/ Cont if Necessary Per Protocol 03/31/2025  2:42 PM    Interpretation Summary  1.  Normal left ventricular size and systolic function, LVEF 50-55%.  2.  Moderate to severe concentric LVH.  3.  Grade 2 diastolic dysfunction.  4.  Normal right ventricular size and systolic function.  5.  Moderately increased left atrial volume index.  6.  Moderate calcification of the aortic valve with moderate aortic stenosis.  7.  Moderate aortic regurgitation.  8.  Mild mitral regurgitation.      Current medications:  Scheduled Meds:allopurinol, 100 mg, Oral, Daily  arformoterol, 15 mcg, Nebulization, BID - RT   And  budesonide, 0.5 mg, Nebulization, BID - RT   And  revefenacin, 175 mcg, Nebulization, Daily - RT  cetirizine, 5 mg, Oral, Daily  cholecalciferol, 1,000 Units, Oral, Daily  DULoxetine, 60 mg, Oral, Daily  ferrous sulfate, 325 mg, Oral, Daily With Breakfast  fluticasone, 2 spray, Each Nare, Daily  heparin (porcine), 5,000 Units, Subcutaneous, Q8H  HYDROcodone-acetaminophen, 1 tablet, Oral, Q4H  insulin lispro, 2-7 Units, Subcutaneous, 4x Daily AC & at Bedtime  isosorbide mononitrate, 30 mg, Oral, QAM  levothyroxine, 50 mcg, Oral, Daily  montelukast, 10 mg, Oral, Nightly  multivitamin with minerals, 1 tablet, Oral, Daily  pantoprazole, 40 mg, Oral, Q AM  polyethylene glycol, 17 g, Oral, Daily  pregabalin, 75 mg, Oral, Q8H  sodium chloride, 10 mL, Intravenous, Q12H  valsartan, 160 mg, Oral, Daily  vitamin B-12, 1,000 mcg, Oral, Daily      Continuous Infusions:   PRN Meds:.  acetaminophen **OR** acetaminophen **OR** acetaminophen    albuterol    senna-docusate sodium **AND** polyethylene glycol **AND** bisacodyl **AND** bisacodyl    Calcium Replacement - Follow Nurse / BPA Driven Protocol     dextrose    dextrose    furosemide    glucagon (human recombinant)    HYDROmorphone **AND** naloxone    Magnesium Standard Dose Replacement - Follow Nurse / BPA Driven Protocol    Morphine **AND** naloxone    ondansetron ODT    Phosphorus Replacement - Follow Nurse / BPA Driven Protocol    Potassium Replacement - Follow Nurse / BPA Driven Protocol    sodium chloride    sodium chloride    tiZANidine    Assessment & Plan   Assessment & Plan     Active Hospital Problems    Diagnosis  POA    **Spinal stenosis [M48.00]  Yes    Bilateral sacroiliitis [M46.1]  Yes    Aortic valve stenosis and insufficiency, rheumatic [I06.2]  Yes    CAD (coronary artery disease) [I25.10]  Yes    Chronic diastolic heart failure [I50.32]  Yes    KALYN (obstructive sleep apnea) [G47.33]  Yes    CKD (chronic kidney disease), stage III [N18.30]  Yes    Hypothyroidism [E03.9]  Yes      Resolved Hospital Problems   No resolved problems to display.      Brief Hospital Course to date:  Raquel Mariee is a 69 y.o. female with history of HFpEF, CVID on IVIg monthly, DMII, Neuropathy, anemia, GERD, depression and hypothyroid who presents with back and radicular pain in the right leg.     Lumbar radiculopathy  - patient seen by Neurosurgery, patient with disc extrusion at L2/3  - continue conservative management, Lyrica increased to 100 mg every 8 hours, seems to be tolerating this well, continue to closely monitor.  - PT/OT following, recommend rehab, CM following  - follow up with Neurosurgery in clinic as an outpatient in 2 weeks     Hyponatremia  - sodium 127 at admission, improved to 131   - asymptomatic and appears chronic, serum Osm 281  - Continue as needed Lasix     Asthma  - continue nebs     UTI vs asymptomatic bacteriuria  - culture shows 100K [place], however only 6-10 WBCs and trace bacteria on UA  - afebrile, no leukocytosis, denies dysuria or increased frequency  - multiple drug allergies, will defer antibiotics at present, monitor  clinically,      Chronic HFpEF   Hypertension  - Currently seems compensated   -Continue Imdur, valsartan  - Continue home prn Lasix     Well-controlled type 2 diabetes,   Neuropathy  -FSBG's reviewed and appropriate  -Continue SSI     CVID  - on monthly IVIg through her Immunologist     Iron def anemia  -Continue iron supplement     GERD-continue PPI     Depression  -Continue  Cymbalta, she is no longer taking Wellbutrin     Hypothyroidism  - levothyroxine  - TSH 1.4     Expected Discharge Location and Transportation: Inpatient rehab  Expected Discharge   Expected Discharge Date: 7/9/2025; Expected Discharge Time:      VTE Prophylaxis:  Pharmacologic VTE prophylaxis orders are present.       AM-PAC 6 Clicks Score (PT): 21 (07/09/25 2000)    CODE STATUS:   Code Status and Medical Interventions: CPR (Attempt to Resuscitate); Full Support   Ordered at: 07/04/25 1680     Code Status (Patient has no pulse and is not breathing):    CPR (Attempt to Resuscitate)     Medical Interventions (Patient has pulse or is breathing):    Full Support       Adolfo Velazco MD  07/10/25

## 2025-07-10 NOTE — PLAN OF CARE
Goal Outcome Evaluation:  Plan of Care Reviewed With: (P) patient        Progress: (P) no change  Outcome Evaluation: (P) Pt continues to report increased pain with WB through RLE with ambulation, however ambulated 8' further this session. Educated pt on addtional B hip stretching techniques to help relieve pain. IPPT contnues to be indicated to address currents deficits. Continue with current pt POC.    Anticipated Discharge Disposition (PT): (P) inpatient rehabilitation facility

## 2025-07-10 NOTE — THERAPY TREATMENT NOTE
Patient Name: Raquel Mariee  : 1955    MRN: [id number]                              Today's Date: 7/10/2025       Admit Date: 2025    Visit Dx: No diagnosis found.  Patient Active Problem List   Diagnosis    Hypogammaglobulinemia    Gastric bypass status for obesity    CKD (chronic kidney disease), stage III    KALYN (obstructive sleep apnea)    Major depressive disorder in partial remission    Hypothyroidism    Fibromyalgia syndrome    Abdominal aortic aneurysm (AAA) without rupture    Nonrheumatic aortic valve stenosis    Vitamin D deficiency, unspecified     Anatomical narrow angle borderline glaucoma    Abnormal finding of blood chemistry, unspecified     Chronic diastolic heart failure    Prinzmetal angina    Common variable agammaglobulinemia    Polymyositis    Port-A-Cath in place    Abnormal CT of the chest    Acute bronchospasm    Acute kidney injury    Sinusitis, chronic    Anemia    Aortic valve stenosis and insufficiency, rheumatic    Body mass index (BMI) of 40.0 to 44.9 in adult    Calcium kidney stones    Choroidal nevus of left eye    CAD (coronary artery disease)    Claw toe, acquired    Combined forms of age-related cataract of both eyes    Depressive disorder    Essential hypertension with goal blood pressure less than 140/90    Severe persistent asthma with exacerbation    Glaucoma suspect of both eyes    Inappropriate sinus tachycardia    Migraine without aura    Mitral valve regurgitation    Osteoarthrosis involving lower leg    Pain in joint involving lower leg    Posterior vitreous detachment of both eyes    Primary osteoarthritis of left wrist    Anxiety disorder    Status post total knee replacement    Sleep disturbances    Senile nuclear sclerosis    Rotator cuff syndrome of left shoulder    Pure hypercholesterolemia    Thoracic aortic aneurysm without rupture    TIA (transient ischemic attack)    Tracheomalacia    Trochlear nerve palsy, left    Diabetes mellitus without  complication    Urge incontinence of urine    Acute exacerbation of chronic obstructive pulmonary disease (COPD)    Internal hemorrhoid, bleeding    Spinal stenosis    Bilateral sacroiliitis     Past Medical History:   Diagnosis Date    Anxiety     Aortic valve stenosis     Cardiac murmur     Cataract, bilateral     CHF (congestive heart failure)     Cholelithiasis GB out    Chronic kidney disease     Stage III    Clostridium difficile infection     in her 30s    Colon polyp Ever since having colonoscopies.    Common variable immunodeficiency     IVIG infusions    Compression fracture of lumbar spine, non-traumatic     COPD (chronic obstructive pulmonary disease)     Coronary artery disease     Prinz metal angina & aortic stenosis    Depression     Diabetes mellitus     type II    Eosinophilic asthma     Fibromyalgia, primary     Full dentures     GERD (gastroesophageal reflux disease)     History of transfusion     in [facility] at [facility].  No reaction noted, patient states she does have an antibody from transfusion.    Hypertension     Hypogammaglobulinemia     Hypothyroidism     Irritable bowel syndrome     Migraines     Morbid obesity     Optic neuritis     Optic neuropathy     Osteoarthritis     Prinzmetal angina 1990    Pseudomonas infection 1998    lungs    PTSD (post-traumatic stress disorder)     Pulmonary edema     Renal insufficiency     Sinus tachycardia 1990    Sleep apnea     no longer uses/needs cpap    Stroke     TIA about 7 years ago    Tachycardia     TIA (transient ischemic attack) 2017    Trochanteric bursitis 01/25/2023     Past Surgical History:   Procedure Laterality Date    APPENDECTOMY      BUNIONECTOMY Bilateral     CARDIAC CATHETERIZATION  2017    no stents needed    CARPAL TUNNEL RELEASE Right     COLONOSCOPY  2021    ENDOMETRIAL ABLATION  1997    EYE SURGERY  ? About 6-8 years ago    Laser for glaucoma    FRACTURE SURGERY  1995    No hardware; Tibeal plateau    GASTRIC BYPASS       HAND SURGERY Left     No hardware    HEMORRHOIDECTOMY N/A 04/09/2024    Procedure: HEMORRHOID BANDING X2;  Surgeon: Melissa Beck MD;  Location: [facility] OR;  Service: General;  Laterality: N/A;    INTERSTIM PLACEMENT N/A 05/03/2023    Procedure: INTERSTIM STAGES 1 AND 2 LEAD AND GENERATOR PLACEMENT;  Surgeon: Abner Nation MD;  Location: [facility] OR;  Service: Urology;  Laterality: N/A;    POSTERIOR VAGINAL REPAIR N/A 08/02/2023    Procedure: POSTERIOR COLPORRHAPHY;  Surgeon: Kellen Galan MD;  Location: [facility] OR;  Service: Gynecology;  Laterality: N/A;    RECTAL EXAMINATION UNDER ANESTHESIA N/A 04/09/2024    Procedure: RECTAL EXAM UNDER ANESTHESIA;  Surgeon: Melissa Beck MD;  Location: [facility] OR;  Service: General;  Laterality: N/A;    REPLACEMENT TOTAL KNEE BILATERAL      TEETH EXTRACTION      all of bottom teeth removed    THORACOTOMY      TONSILLECTOMY      TOTAL ABDOMINAL HYSTERECTOMY WITH SALPINGO OOPHORECTOMY      TRACHEAL SURGERY      Repaired via thoracotomy    TUBAL ABDOMINAL LIGATION  1983    VENOUS ACCESS DEVICE (PORT) INSERTION      port a cath in the left chest wall      General Information       Row Name 07/10/25 1058          Physical Therapy Time and Intention    Document Type therapy note (daily note) (P)   -ES     Mode of Treatment physical therapy;individual therapy (P)   -ES       Row Name 07/10/25 1058          General Information    Patient Profile Reviewed yes (P)   -ES     Existing Precautions/Restrictions spinal;fall;other (see comments) (P)   RLE radicular pain  -ES     Barriers to Rehab medically complex;previous functional deficit (P)   -ES       Row Name 07/10/25 1058          Cognition    Orientation Status (Cognition) oriented x 4 (P)   -ES       Row Name 07/10/25 1058          Safety Issues/Impairments Affecting Functional Mobility    Safety Issues Affecting Function (Mobility) judgment;insight into deficits/self-awareness;safety precaution  awareness;safety precautions follow-through/compliance (P)   -ES     Impairments Affecting Function (Mobility) endurance/activity tolerance;pain;postural/trunk control;strength;balance (P)   -ES               User Key  (r) = Recorded By, (t) = Taken By, (c) = Cosigned By      Initials Name Provider Type    Tamika Valentine, PT Student PT Student                   Mobility       Row Name 07/10/25 1102          Bed Mobility    Bed Mobility supine-sit (P)   -ES     Supine-Sit [place] (Bed Mobility) modified independence (P)   -ES     Assistive Device (Bed Mobility) head of bed elevated (P)   -ES     Comment, (Bed Mobility) Pt with good abilty to perform logroll technique. (P)   -ES       Row Name 07/10/25 1102          Sit-Stand Transfer    Sit-Stand [place] (Transfers) modified independence (P)   -ES     Assistive Device (Sit-Stand Transfers) walker, front-wheeled (P)   -ES     Comment, (Sit-Stand Transfer) Pt wth good BUE sequencing w FWW for stand. Pt reported increased pain with stand (P)   -ES       Row Name 07/10/25 1102          Gait/Stairs (Locomotion)    [place] Level (Gait) standby assist;1 person assist;verbal cues (P)   -ES     Assistive Device (Gait) walker, front-wheeled (P)   -ES     Patient was able to Ambulate yes (P)   -ES     Distance in Feet (Gait) 28 (P)   -ES     Deviations/Abnormal Patterns (Gait) right sided deviations;stride length decreased;weight shifting decreased;gait speed decreased;antalgic;scott decreased (P)   -ES     Bilateral Gait Deviations forward flexed posture (P)   -ES     Left Sided Gait Deviations leans left (P)   -ES     Right Sided Gait Deviations weight shift ability decreased;heel strike decreased (P)   -ES     [place]ver Level (Stairs) not tested (P)   -ES     Comment, (Gait/Stairs) Pt continues to ambulate w step to gait pattern, limiting weight acceptance through RLE with sig BUE WB through FWW. Further distances continue to be limited by  "increasing pain. (P)   -ES               User Key  (r) = Recorded By, (t) = Taken By, (c) = Cosigned By      Initials Name Provider Type    Tamika Valentine, PT Student PT Student                   Obj/Interventions       Row Name 07/10/25 1106          Motor Skills    Therapeutic Exercise hip (P)   -ES       Row Name 07/10/25 1106          Hip (Therapeutic Exercise)    Hip (Therapeutic Exercise) AROM (active range of motion);other (see comments) (P)   seated L piriformis \"4\" stretch [id number]  -ES     Hip AROM (Therapeutic Exercise) bilateral;knee to chest stretch;right;seated rotation stretch;5 repetitions;other (see comments) (P)   10sec hold  -ES       Row Name 07/10/25 1106          Balance    Balance Assessment sitting static balance;sitting dynamic balance;standing static balance;standing dynamic balance (P)   -ES     Static Sitting Balance modified independence (P)   -ES     Dynamic Sitting Balance modified independence (P)   -ES     Position, Sitting Balance unsupported;sitting edge of bed (P)   -ES     Static Standing Balance standby assist (P)   -ES     Dynamic Standing Balance standby assist (P)   -ES     Position/Device Used, Standing Balance supported;walker, front-wheeled (P)   -ES     Balance Interventions supported;sit to stand;static;dynamic;standing;sitting (P)   -ES     Comment, Balance No overt LOB with transfers or ambulation (P)   -ES               User Key  (r) = Recorded By, (t) = Taken By, (c) = Cosigned By      Initials Name Provider Type    Tamika Valentine, PT Student PT Student                   Goals/Plan    No documentation.                  Clinical Impression       Row Name 07/10/25 1108          Pain    Pretreatment Pain Rating 6/10 (P)   -ES     Posttreatment Pain Rating 6/10 (P)   -ES     Pain Location hip;extremity (P)   -ES     Pain Side/Orientation bilateral;right;lower (P)   -ES     Pain Management Interventions exercise or physical activity utilized;movement " retraining implemented;nursing notified;premedicated for activity;positioning techniques utilized (P)   -ES     Response to Pain Interventions activity participation with tolerable pain (P)   -ES     Pre/Posttreatment Pain Comment 9/10 pain with ambulation (P)   -ES       Row Name 07/10/25 1108          Plan of Care Review    Plan of Care Reviewed With patient (P)   -ES     Progress no change (P)   -ES     Outcome Evaluation Pt continues to report increased pain with WB through RLE with ambulation, however ambulated 8' further this session. Educated pt on addtional B hip stretching techniques to help relieve pain. IPPT contnues to be indicated to address currents deficits. Continue with current pt POC. (P)   -ES       Row Name 07/10/25 1108          Vital Signs    Pre Systolic BP Rehab 143 (P)   -ES     Pre Treatment Diastolic BP 79 (P)   -ES     Pretreatment Heart Rate (beats/min) 87 (P)   -ES     Pre SpO2 (%) 96 (P)   -ES     O2 Delivery Pre Treatment room air (P)   -ES     Post SpO2 (%) 96 (P)   -ES     O2 Delivery Post Treatment room air (P)   -ES     Pre Patient Position Supine (P)   -ES     Intra Patient Position Standing (P)   -ES     Post Patient Position Supine (P)   -ES       Row Name 07/10/25 1109          Positioning and Restraints    Pre-Treatment Position in bed (P)   -ES     Post Treatment Position bed (P)   -ES     In Bed fowlers;legs elevated;call light within reach;encouraged to call for assist;side rails up x2;notified nsg (P)   -ES               User Key  (r) = Recorded By, (t) = Taken By, (c) = Cosigned By      Initials Name Provider Type    ES Tamika Thurston, PT Student PT Student                   Outcome Measures       Row Name 07/10/25 1112          How much help from another person do you currently need...    Turning from your back to your side while in flat bed without using bedrails? 4 (P)   -ES     Moving from lying on back to sitting on the side of a flat bed without bedrails? 4 (P)    -ES     Moving to and from a bed to a chair (including a wheelchair)? 4 (P)   -ES     Standing up from a chair using your arms (e.g., wheelchair, bedside chair)? 4 (P)   -ES     Climbing 3-5 steps with a railing? 3 (P)   -ES     To walk in hospital room? 4 (P)   -ES     AM-PAC 6 Clicks Score (PT) 23 (P)   -ES     Highest Level of Mobility Goal Walk 25 Feet or More-7 (P)   -ES       Row Name 07/10/25 1112          Functional Assessment    Outcome Measure Options AM-PAC 6 Clicks Basic Mobility (PT) (P)   -ES               User Key  (r) = Recorded By, (t) = Taken By, (c) = Cosigned By      Initials Name Provider Type    Tamika Valentine, PT Student PT Student                                 Physical Therapy Education       Title: PT OT SLP Therapies (In Progress)       Topic: Physical Therapy (In Progress)       Point: Mobility training (In Progress)       Learning Progress Summary            Patient Acceptance, E,D, NR by  at 7/10/2025 1112    Acceptance, E, VU by CK at 7/9/2025 1055    Acceptance, E, VU by CK at 7/8/2025 1613    Acceptance, E,D, VU,NR by AB at 7/7/2025 1553    Eager, E,TB,D, VU,DU by SC at 7/6/2025 1600    Comment: reviewed HEP    Eager, E, VU by SC at 7/5/2025 1419    Comment: re idwed HEP                      Point: Home exercise program (In Progress)       Learning Progress Summary            Patient Acceptance, E,D, NR by  at 7/10/2025 1112    Acceptance, E, VU by CK at 7/9/2025 1055    Acceptance, E, VU by CK at 7/8/2025 1613    Acceptance, E,D, VU,NR by AB at 7/7/2025 1553    Eager, E,TB,D, VU,DU by SC at 7/6/2025 1600    Comment: reviewed HEP    Eager, E, VU by SC at 7/5/2025 1419    Comment: re idwed HEP                      Point: Body mechanics (In Progress)       Learning Progress Summary            Patient Acceptance, E,D, NR by ES at 7/10/2025 1112    Acceptance, E, VU by CK at 7/9/2025 1055    Acceptance, E, VU by CK at 7/8/2025 1613    Acceptance, E,D, VU,NR by AB at  7/7/2025 1553    Eager, E,TB,D, VU,DU by SC at 7/6/2025 1600    Comment: reviewed HEP    Eager, E, VU by SC at 7/5/2025 1419    Comment: re idwed HEP                      Point: Precautions (In Progress)       Learning Progress Summary            Patient Acceptance, E,D, NR by  at 7/10/2025 1112    Acceptance, E, VU by  at 7/9/2025 1055    Acceptance, E, VU by CK at 7/8/2025 1613    Acceptance, E,D, VU,NR by AB at 7/7/2025 1553    Eager, E,TB,D, VU,DU by SC at 7/6/2025 1600    Comment: reviewed HEP    Eager, E, VU by SC at 7/5/2025 1419    Comment: re idwed HEP                                      User Key       Initials Effective Dates Name Provider Type Discipline    SC 02/03/23 -  Apoilnar Butts, PT Physical Therapist PT    AB 09/22/22 -  Kelli Mar, PT Physical Therapist PT     02/06/24 -  Jeanette Reynoso, PT Physical Therapist PT     05/05/25 -  Tamika Thurston, PT Student PT Student PT                  PT Recommendation and Plan     Progress: (P) no change  Outcome Evaluation: (P) Pt continues to report increased pain with WB through RLE with ambulation, however ambulated 8' further this session. Educated pt on addtional B hip stretching techniques to help relieve pain. IPPT contnues to be indicated to address currents deficits. Continue with current pt POC.     Time Calculation:         PT Charges       Row Name 07/10/25 1112             Time Calculation    Start Time 1029 (P)   -ES      PT Received On 07/10/25 (P)   -ES         Timed Charges    49715 - PT Therapeutic Exercise Minutes 18 (P)   -ES      81228 - Gait Training Minutes  10 (P)   -ES         Total Minutes    Timed Charges Total Minutes 28 (P)   -ES       Total Minutes 28 (P)   -ES                User Key  (r) = Recorded By, (t) = Taken By, (c) = Cosigned By      Initials Name Provider Type    ES Tamika Thurston, PT Student PT Student                  Therapy Charges for Today       Code Description Service Date Service  Provider Modifiers Qty    [id number]  PT THER PROC EA 15 MIN 7/10/2025 Tamika Thurston, PT Student GP 1    [id number]  GAIT TRAINING EA 15 MIN 7/10/2025 Tamika Thurston, PT Student GP 1            PT G-Codes  Outcome Measure Options: (P) AM-PAC 6 Clicks Basic Mobility (PT)  AM-PAC 6 Clicks Score (PT): (P) 23  AM-PAC 6 Clicks Score (OT): 20  PT Discharge Summary  Anticipated Discharge Disposition (PT): (P) inpatient rehabilitation facility    Tamika Thurston, PT Student  7/10/2025

## 2025-07-11 ENCOUNTER — READMISSION MANAGEMENT (OUTPATIENT)
Dept: CALL CENTER | Facility: HOSPITAL | Age: 70
End: 2025-07-11
Payer: MEDICARE

## 2025-07-11 VITALS
HEIGHT: 65 IN | SYSTOLIC BLOOD PRESSURE: 148 MMHG | WEIGHT: 205.03 LBS | OXYGEN SATURATION: 97 % | DIASTOLIC BLOOD PRESSURE: 80 MMHG | TEMPERATURE: 98.2 F | RESPIRATION RATE: 18 BRPM | HEART RATE: 96 BPM | BODY MASS INDEX: 34.16 KG/M2

## 2025-07-11 LAB
GLUCOSE BLDC GLUCOMTR-MCNC: 123 MG/DL (ref 70–130)
GLUCOSE BLDC GLUCOMTR-MCNC: 139 MG/DL (ref 70–130)

## 2025-07-11 PROCEDURE — 97530 THERAPEUTIC ACTIVITIES: CPT

## 2025-07-11 PROCEDURE — 99239 HOSP IP/OBS DSCHRG MGMT >30: CPT | Performed by: INTERNAL MEDICINE

## 2025-07-11 PROCEDURE — 94799 UNLISTED PULMONARY SVC/PX: CPT

## 2025-07-11 PROCEDURE — 82948 REAGENT STRIP/BLOOD GLUCOSE: CPT

## 2025-07-11 PROCEDURE — 25010000002 HEPARIN LOCK FLUSH PER 10 UNITS: Performed by: INTERNAL MEDICINE

## 2025-07-11 PROCEDURE — 94664 DEMO&/EVAL PT USE INHALER: CPT

## 2025-07-11 RX ORDER — HEPARIN SODIUM (PORCINE) LOCK FLUSH IV SOLN 100 UNIT/ML 100 UNIT/ML
500 SOLUTION INTRAVENOUS ONCE
Status: COMPLETED | OUTPATIENT
Start: 2025-07-11 | End: 2025-07-11

## 2025-07-11 RX ORDER — HYDROCODONE BITARTRATE AND ACETAMINOPHEN 5; 325 MG/1; MG/1
1 TABLET ORAL EVERY 6 HOURS PRN
Qty: 12 TABLET | Refills: 0 | Status: SHIPPED | OUTPATIENT
Start: 2025-07-11 | End: 2025-07-14 | Stop reason: SDUPTHER

## 2025-07-11 RX ORDER — PREGABALIN 100 MG/1
100 CAPSULE ORAL EVERY 8 HOURS SCHEDULED
Qty: 9 CAPSULE | Refills: 0 | Status: SHIPPED | OUTPATIENT
Start: 2025-07-11 | End: 2025-07-14 | Stop reason: SDUPTHER

## 2025-07-11 RX ADMIN — POLYETHYLENE GLYCOL 3350 17 G: 17 POWDER, FOR SOLUTION ORAL at 08:24

## 2025-07-11 RX ADMIN — CETIRIZINE HYDROCHLORIDE 5 MG: 10 TABLET, FILM COATED ORAL at 08:25

## 2025-07-11 RX ADMIN — VALSARTAN 160 MG: 160 TABLET, FILM COATED ORAL at 08:25

## 2025-07-11 RX ADMIN — PANTOPRAZOLE SODIUM 40 MG: 40 TABLET, DELAYED RELEASE ORAL at 05:36

## 2025-07-11 RX ADMIN — PREGABALIN 100 MG: 100 CAPSULE ORAL at 13:23

## 2025-07-11 RX ADMIN — BUDESONIDE 0.5 MG: 0.5 INHALANT RESPIRATORY (INHALATION) at 07:59

## 2025-07-11 RX ADMIN — CYANOCOBALAMIN TAB 1000 MCG 1000 MCG: 1000 TAB at 08:25

## 2025-07-11 RX ADMIN — HYDROCODONE BITARTRATE AND ACETAMINOPHEN 1 TABLET: 5; 325 TABLET ORAL at 13:23

## 2025-07-11 RX ADMIN — Medication 10 ML: at 08:35

## 2025-07-11 RX ADMIN — LEVOTHYROXINE SODIUM 50 MCG: 0.05 TABLET ORAL at 08:25

## 2025-07-11 RX ADMIN — HYDROCODONE BITARTRATE AND ACETAMINOPHEN 1 TABLET: 5; 325 TABLET ORAL at 01:47

## 2025-07-11 RX ADMIN — HYDROCODONE BITARTRATE AND ACETAMINOPHEN 1 TABLET: 5; 325 TABLET ORAL at 05:35

## 2025-07-11 RX ADMIN — ARFORMOTEROL TARTRATE 15 MCG: 15 SOLUTION RESPIRATORY (INHALATION) at 07:47

## 2025-07-11 RX ADMIN — PREGABALIN 100 MG: 100 CAPSULE ORAL at 05:36

## 2025-07-11 RX ADMIN — FERROUS SULFATE TAB 325 MG (65 MG ELEMENTAL FE) 325 MG: 325 (65 FE) TAB at 08:25

## 2025-07-11 RX ADMIN — Medication 1 TABLET: at 08:25

## 2025-07-11 RX ADMIN — FLUTICASONE PROPIONATE 2 SPRAY: 50 SPRAY, METERED NASAL at 08:26

## 2025-07-11 RX ADMIN — Medication 1000 UNITS: at 08:26

## 2025-07-11 RX ADMIN — HEPARIN 500 UNITS: 100 SYRINGE at 12:56

## 2025-07-11 RX ADMIN — DULOXETINE 60 MG: 60 CAPSULE, DELAYED RELEASE ORAL at 08:25

## 2025-07-11 RX ADMIN — ALBUTEROL SULFATE 2.5 MG: 2.5 SOLUTION RESPIRATORY (INHALATION) at 07:47

## 2025-07-11 RX ADMIN — HYDROCODONE BITARTRATE AND ACETAMINOPHEN 1 TABLET: 5; 325 TABLET ORAL at 08:32

## 2025-07-11 RX ADMIN — ALLOPURINOL 100 MG: 100 TABLET ORAL at 08:26

## 2025-07-11 RX ADMIN — ISOSORBIDE MONONITRATE 30 MG: 30 TABLET, EXTENDED RELEASE ORAL at 07:43

## 2025-07-11 NOTE — DISCHARGE SUMMARY
[facility] Medicine Services  DISCHARGE SUMMARY    Patient Name: Raquel Mariee  : 1955  MRN: [id number]    Date of Admission: 2025  8:41 PM  Date of Discharge: 2025  Primary Care Physician: Sanjeev Rawls MD    Consults       Date and Time Order Name Status Description    2025  2:36 AM Inpatient Neurosurgery Consult Completed             Hospital Course     Active Hospital Problems    Diagnosis  POA    **Spinal stenosis [M48.00]  Yes    Bilateral sacroiliitis [M46.1]  Yes    Aortic valve stenosis and insufficiency, rheumatic [I06.2]  Yes    CAD (coronary artery disease) [I25.10]  Yes    Chronic diastolic heart failure [I50.32]  Yes    KALYN (obstructive sleep apnea) [G47.33]  Yes    CKD (chronic kidney disease), stage III [N18.30]  Yes    Hypothyroidism [E03.9]  Yes      Resolved Hospital Problems   No resolved problems to display.          Hospital Course:  Raquel Mariee is a 69 y.o. female with history of HFpEF, CVID on IVIg monthly, DMII, Neuropathy, anemia, GERD, depression and hypothyroid who presents with back and radicular pain in the right leg.     Lumbar radiculopathy  - patient seen by Neurosurgery, patient with disc extrusion at L2/3  - continue conservative management, Lyrica increased to 100 mg every 8 hours, seems to be tolerating this well, will continue this at [facility]  - PT/OT followed, recommended rehab, however patient felt better and elected to discharge home  - follow up with Neurosurgery in clinic as an outpatient in 2 weeks     Hyponatremia  - sodium 127 at admission, improved to 131   - asymptomatic and appears chronic, serum Osm 281  - Continue as needed Lasix     Asthma  - continue nebs     UTI vs asymptomatic bacteriuria  - culture shows 100K Klebse[place], however only 6-10 WBCs and trace bacteria on UA  - afebrile, no leukocytosis, denies dysuria or increased frequency  - multiple drug allergies, antibiotics were deferred       Chronic  HFpEF   Hypertension  - Currently seems compensated   -Continue Imdur, valsartan  - Continue home prn Lasix     Well-controlled type 2 diabetes,   Neuropathy  -FSBG's reviewed and appropriate  -s/p ssi     CVID  - on monthly IVIg through her Immunologist     Iron def anemia  -Continue iron supplement     GERD-continue PPI     Depression  -Continue  Cymbalta, she is no longer taking Wellbutrin     Hypothyroidism  - levothyroxine  - TSH 1.4       Discharge Follow Up Recommendations for outpatient labs/diagnostics:  Follow-up with PCP in 1 week  Follow-up with neurosurgery as previously scheduled    Day of Discharge     HPI: No acute events overnight, patient feels much better, pain is controlled     Vital Signs:   Temp:  [97.7 °F (36.5 °C)-98.4 °F (36.9 °C)] 98.2 °F (36.8 °C)  Heart Rate:  [75-96] 96  Resp:  [16-18] 18  BP: (115-175)/() 148/80    Physical Exam:  Constitutional: No acute distress, awake, alert  HENT: NCAT, mucous membranes moist  Respiratory: Clear to auscultation bilaterally, respiratory effort normal   Cardiovascular: RRR, systolic murmur  Gastrointestinal: Positive bowel sounds, soft, nontender, nondistended  Musculoskeletal: No bilateral ankle edema  Psychiatric: Appropriate affect, cooperative  Neurologic: Oriented x 3, nonfocal    Pertinent  and/or Most Recent Results     LAB RESULTS:      Lab 07/05/25  0434 07/04/25  1227   WBC 5.49 6.81   HEMOGLOBIN 12.5 12.1   HEMATOCRIT 36.8 35.4   PLATELETS 205 211   NEUTROS ABS  --  5.31   IMMATURE GRANS (ABS)  --  0.04   LYMPHS ABS  --  0.92   MONOS ABS  --  0.51   EOS ABS  --  0.00   MCV 95.8 93.7         Lab 07/05/25  0434 07/04/25  1227   SODIUM 131* 127*   POTASSIUM 4.4 4.5   CHLORIDE 96* 93*   CO2 23.7 22.8   ANION GAP 11.3 11.2   BUN 10.2 15.0   CREATININE 0.87 0.95   EGFR 72.2 65.0   GLUCOSE 160* 130*   CALCIUM 8.6 8.9   MAGNESIUM 2.0  --    TSH 1.450  --          Lab 07/04/25  1227   TOTAL PROTEIN 7.0   ALBUMIN 3.8   GLOBULIN 3.2   ALT (SGPT)  18   AST (SGOT) 30   BILIRUBIN 0.4   ALK PHOS 44                     Brief Urine Lab Results  (Last result in the past 365 days)        Color   Clarity   Blood   Leuk Est   Nitrite   Protein   CREAT   Urine HCG        07/04/25 1327 Yellow   Clear   Trace   Small (1+)   Negative   Negative                 Microbiology Results (last 10 days)       Procedure Component Value - Date/Time    Urine Culture - Urine, Urine, Clean Catch [[id number]]  (Abnormal)  (Susceptibility) Collected: 07/04/25 1327    Lab Status: Final result Specimen: Urine, Clean Catch Updated: 07/06/25 0949     Urine Culture >100,000 CFU/mL Klebsiella pneumoniae ssp pneumoniae    Narrative:      Colonization of the urinary tract without infection is common. Treatment is discouraged unless the patient is symptomatic, pregnant, or undergoing an invasive urologic procedure.    Susceptibility        Klebsiella pneumoniae ssp pneumoniae      DEBBI      Amoxicillin + Clavulanate Susceptible      Ampicillin Resistant      Ampicillin + Sulbactam Susceptible      Cefazolin (Urine) Susceptible      Cefepime Susceptible      Ceftazidime Susceptible      Ceftriaxone Susceptible      Cefuroxime axetil Susceptible      Gentamicin Susceptible      Levofloxacin Susceptible      Nitrofurantoin Intermediate      Piperacillin + Tazobactam Susceptible      Trimethoprim + Sulfamethoxazole Susceptible                                   MRI Cervical Spine Without Contrast  Result Date: 7/5/2025  MRI CERVICAL SPINE WO CONTRAST Date of Exam: 7/5/2025 2:12 AM EDT Indication: severe stenosis.  Comparison: None. Technique:  Routine multiplanar/multisequence sequence images of the cervical spine were obtained without contrast administration.  Findings: Seven cervical vertebra are identified. Alignment is anatomic. Vertebral bodies maintain normal height.  There is no focal bone marrow edema.  No focal lesions identified. Spinal cord signal is normal. Paraspinal musculature is grossly  preserved. The craniocervical junction appears within normal limits. C2-C3: Small disc osteophyte complex present with bilateral facet perjury. No evidence of canal stenosis or significant neuroforaminal narrowing. C3-C4: Small disc osteophyte complex present with bilateral facet hypertrophy. There is effacement of the thecal sac anteriorly with no evidence of canal stenosis. Moderate neuroforaminal narrowing is present on the left with no significant neuroforaminal narrowing present on the right. Trace anterolisthesis of C3 on C4 present measuring 2 mm.. C4-C5: Moderate size disc osteophyte complex present with bilateral facet hypertrophy. There is effacement of the thecal sac anteriorly with moderate canal stenosis with the canal measuring up to 8 mm in AP dimension. Moderate neural foraminal narrowing is present on the right with moderate to severe neural foraminal narrowing present on the left. C5-C6: Small to moderate-sized disc osteophyte complex present with bilateral facet hypertrophy. There is effacement of the thecal sac anteriorly with mild canal stenosis with the canal measuring up to 9 mm in AP dimension. Moderate neuroforaminal narrowing is present bilaterally, left greater than right. C6-C7: Moderate size disc osteophyte complex present with bilateral facet hypertrophy. There is effacement of the thecal sac anteriorly with moderate canal stenosis with the canal measuring up to 8 mm in AP dimension. Moderate neuroforaminal narrowing is  present on the left with mild to moderate neural foraminal narrowing present on the right. C7-T1: Small disc osteophyte complex present with bilateral facet apostrophe. No evidence of canal stenosis or significant neuroforaminal narrowing.     Impression: No acute osseous abnormality. Multilevel degenerative changes are present throughout the spine with multilevel canal stenosis present, most pronounced at C4-C5 and C6-C7. Varying degrees of neuroforaminal narrowing as  described above. Electronically Signed: Malika Irby MD  7/5/2025 3:03 AM EDT  Workstation ID: [id number]    MRI Thoracic Spine Without Contrast  Result Date: 7/5/2025  MRI THORACIC SPINE WO CONTRAST Date of Exam: 7/5/2025 12:56 AM EDT Indication: r incontinence, evaluate for cauda equina.  Comparison: 7/4/2025, 6/18/2025. Technique:  Routine multiplanar/multisequence sequence images of the thoracic spine were obtained without contrast administration. Findings: There are 12 thoracic thoracic type vertebral bodies in normal anatomic alignment. No evidence of fracture or compression deformity. No significant spondylolisthesis. Mild to moderate degenerative changes are present throughout the spine. There is no evidence of canal stenosis. No cord signal abnormality identified. Bilateral facet hypertrophy present at T11-T12 with facet joint effusions with mild effacement of the thecal sac with no evidence of canal stenosis. No significant neuroforaminal narrowing identified at any level. No focal soft tissue abnormalities identified.     Impression: No acute osseous abnormality. Mild to moderate degenerative changes are present throughout the spine with no evidence of canal stenosis or significant neuroforaminal narrowing at any level. Electronically Signed: Malika Irby MD  7/5/2025 1:40 AM EDT  Workstation ID: [id number]    MRI Lumbar Spine Without Contrast  Result Date: 7/4/2025  MRI LUMBAR SPINE WO CONTRAST Date of Exam: 7/4/2025 10:52 PM EDT Indication: r back pain, incontinence, worsening pain  Comparison: 7/4/2025, 6/18/2025. Technique:  Routine multiplanar/multisequence sequence images of the lumbar spine were obtained without contrast administration.  Findings: Five lumbar type vertebral bodies are identified.  Alignment is anatomic.  There is no evidence of fracture or compression deformity. Distal spinal cord and conus medullaris appear unremarkable terminating at the L1 level.  Cauda equina appears unremarkable.   Bone marrow signal is within normal limits.  Paraspinal musculature is preserved. L5-S1: No focal disc protrusion or extrusion. Facet joints appear unremarkable. No significant neural foraminal or spinal canal stenosis. L4-L5: There is mild anterolisthesis of L4 and L5 measuring approximately 3 mm related to degenerative change. No pars defect identified. Mild broad-based bulge present with bilateral facet hypertrophy. No evidence of canal stenosis. Mild neural foraminal narrowing present bilaterally, right greater than left. L3-L4: Broad-based bulge present with bilateral facet hypertrophy. There is effacement of the thecal sac anteriorly with no evidence of canal stenosis. Mild neuroforaminal narrowing present bilaterally. L2-L3: Broad-based bulge with superimposed right paracentral disc extrusion present with effacement of the right lateral recess. Facet hypertrophy is present. There is effacement of the thecal sac with no evidence of canal stenosis. At least moderate neuroforaminal narrowing present bilaterally. L1-L2: No focal disc protrusion or extrusion. Facet joints appear unremarkable. No significant neural foraminal or spinal canal stenosis.     Impression: No acute osseous abnormality. Multilevel degenerative changes are present throughout the spine with multilevel canal effacement with no evidence of canal stenosis, similar as compared to the previous recent study from 6/18/2025. Varying degrees of neuroforaminal narrowing as described above. Electronically Signed: Malika Irby MD  7/4/2025 11:30 PM EDT  Workstation ID: [id number]    CT Abdomen Pelvis With Contrast  Result Date: 7/4/2025  PROCEDURE: CT ABDOMEN PELVIS W CONTRAST-  HISTORY:  Low back pain, urinary incontinence, fecal incontinence, eval distended bladder or bowel  COMPARISON:  None .  TECHNIQUE: Multiple axial CT images were obtained from the lung bases through the pubic symphysis following the administration of Isovue 300 contrast.   FINDINGS:  ABDOMEN: There is a trace right pleural effusion. There is right pleural thickening. Right middle lobe atelectasis is seen. The heart is normal in size. There are postsurgical changes of gastric bypass. The liver is normal. Gallbladder surgically absent. The spleen is unremarkable. No adrenal mass is present.  The pancreas is normal. There is a 3 mm nonobstructing left renal stone. The aorta is normal in caliber. There is no free fluid or adenopathy.  A moderate amount of stool is seen in the transverse colon.  PELVIS: The appendix is not identified. There is sigmoid diverticulosis. The uterus is surgically absent. The urinary bladder is unremarkable. There is no significant free fluid or adenopathy.      Right pleural thickening and right middle lobe atelectasis may be inflammatory. Short interval follow-up chest CT is recommended.  Moderate amount of stool in the transverse colon is consistent with constipation.  Left nephrolithiasis without hydronephrosis. .   CTDI: 22.43 mGy DLP: 1130.99 mGy.cm   This study was performed with techniques to keep radiation doses as low as reasonably achievable (ALARA). Individualized dose reduction techniques using automated exposure control or adjustment of mA and/or kV according to the patient size were employed.      Images were reviewed, interpreted, and dictated by Dr. Kwaku Grant MD Transcribed by Leyda Rachel PA-C.  This report was signed and finalized on 7/4/2025 2:01 PM by Kwaku Grant MD.      CT Lumbar Spine Without Contrast  Result Date: 7/4/2025  PROCEDURE: CT LUMBAR SPINE WO CONTRAST-  HISTORY: Low back pain, urinary incontinence, eval fracture  TECHNIQUE: Multiple axial CT images were obtained through the lumbar spine using bone window algorithms. Coronal and sagittal images were reconstructed from the original axial data set.  FINDINGS: There is spaces are moderately diffusely degenerated. There is anterolisthesis of L4 on L5. There is  no acute fracture.  There are disc bulges and facet hypertrophy throughout the lumbar spine. This results in canal stenosis and neuroforaminal narrowing throughout the lower lumbar spine.          Degenerative disc disease.  No acute fracture.     DLP: 1130.99 mGy.cm CTDI: 22.43 mGy   This study was performed with techniques to keep radiation doses as low as reasonably achievable (ALARA). Individualized dose reduction techniques using automated exposure control or adjustment of mA and/or kV according to the patient size were employed.     Images were reviewed, interpreted, and dictated by Dr. Kwaku Grant MD Transcribed by Leyda Rachel PA-C.  This report was signed and finalized on 7/4/2025 2:01 PM by Kwaku Grant MD.                Results for orders placed in visit on 03/28/25    Adult Transthoracic Echo Complete W/ Cont if Necessary Per Protocol 03/31/2025  2:42 PM    Interpretation Summary  1.  Normal left ventricular size and systolic function, LVEF 50-55%.  2.  Moderate to severe concentric LVH.  3.  Grade 2 diastolic dysfunction.  4.  Normal right ventricular size and systolic function.  5.  Moderately increased left atrial volume index.  6.  Moderate calcification of the aortic valve with moderate aortic stenosis.  7.  Moderate aortic regurgitation.  8.  Mild mitral regurgitation.      Plan for Follow-up of Pending Labs/Results:     Discharge Details        Discharge Medications        New Medications        Instructions Start Date   pregabalin 100 MG capsule  Commonly known as: LYRICA   100 mg, Oral, Every 8 Hours Scheduled             Changes to Medications        Instructions Start Date   acetaminophen-codeine 300-30 MG per tablet  Commonly known as: TYLENOL with CODEINE #3  What changed: Another medication with the same name was removed. Continue taking this medication, and follow the directions you see here.   Take 1 tablet by mouth Every 6 (Six) Hours.      betamethasone valerate 0.1 %  cream  Commonly known as: VALISONE   Apply topically to the appropriate area as directed Daily.             Continue These Medications        Instructions Start Date   albuterol sulfate  (90 Base) MCG/ACT inhaler  Commonly known as: Ventolin HFA   Inhale 2 puffs Every 4 (Four) to 6 (Six) Hours As Needed for cough, wheeze, and shortness of breath      allopurinol 100 MG tablet  Commonly known as: ZYLOPRIM   100 mg, Oral, Daily      Antacid Maximum 1000 MG chewable tablet  Generic drug: calcium carbonate   500 mg, 3 Times Daily      Breztri Aerosphere 160-9-4.8 MCG/ACT aerosol inhaler  Generic drug: Budeson-Glycopyrrol-Formoterol   Inhale 2 puffs by mouth 2 (Two) Times a Day. Rinse out mouth after use      cholecalciferol 25 MCG (1000 UT) tablet  Commonly known as: VITAMIN D3   1,000 Units, Daily      denosumab 60 MG/ML solution prefilled syringe syringe  Commonly known as: PROLIA   60 mg, Every 6 Months      DULoxetine 60 MG capsule  Commonly known as: Cymbalta   60 mg, Oral, Daily      estradiol 0.1 MG/GM vaginal cream  Commonly known as: ESTRACE   Insert 0.5 grams vaginally twice weekly      ferrous sulfate 325 (65 FE) MG EC tablet   2 tablets, Daily With Breakfast      fexofenadine 180 MG tablet  Commonly known as: ALLEGRA   180 mg, Daily      furosemide 40 MG tablet  Commonly known as: Lasix   40 mg, Oral, Daily PRN      Gammagard 30 GM/300ML solution infusion  Generic drug: immune globulin (human)   50 grams IV every 4 weeks      glucosamine-chondroitin 500-400 MG capsule capsule   1 capsule, 2 Times Daily With Meals      guaiFENesin-codeine 100-10 MG/5ML solution/syrup  Commonly known as: ROMILAR-AC   5 mL, Oral, 3 Times Daily PRN      ipratropium 0.06 % nasal spray  Commonly known as: ATROVENT   Instill 1-2 sprays each nostril 2-3 times daily as needed.      ipratropium-albuterol 0.5-2.5 mg/3 ml nebulizer  Commonly known as: DUO-NEB   Inhale the contents of 1 vial through a nebulizer every 4-6 hours as  needed for cough,wheezing and shortness of breath.      isosorbide mononitrate 30 MG 24 hr tablet  Commonly known as: IMDUR   30 mg, Oral, Every Morning      levothyroxine 50 MCG tablet  Commonly known as: SYNTHROID, LEVOTHROID   50 mcg, Oral, Daily      Linzess 145 MCG capsule capsule  Generic drug: linaclotide   145 mcg, Oral, Every Morning Before Breakfast      Magnesium 250 MG tablet   500 mg, 2 Times Daily      montelukast 10 MG tablet  Commonly known as: SINGULAIR   10 mg, Oral, Every Night at Bedtime      multivitamin with minerals tablet tablet   1 tablet, Daily      Nucala 100 MG/ML solution auto-injector  Generic drug: Mepolizumab   100 mg, Every 28 Days      omeprazole 40 MG capsule  Commonly known as: priLOSEC   40 mg, Oral, 2 Times Daily      ondansetron 4 MG tablet  Commonly known as: ZOFRAN   4 mg, Oral, Every 8 Hours PRN      potassium citrate 10 MEQ (1080 MG) CR tablet  Commonly known as: UROCIT-K   10 mEq, Oral, Daily      predniSONE 20 MG tablet  Commonly known as: DELTASONE   Take 2 tablets prior to Gammgard infusion      Proctozone-HC 2.5 % rectal cream  Generic drug: Hydrocortisone (Perianal)   Apply rectally 2 times daily      Sinus Rinse Kit pack   use once or twice daily as needed      Sitz Bath misc   1 each, Not Applicable, As Needed      solifenacin 5 MG tablet  Commonly known as: VESIcare   5 mg, Oral, Daily      tiZANidine 4 MG tablet  Commonly known as: ZANAFLEX   4 mg, Oral, 2 Times Daily PRN      ubrogepant 100 MG tablet  Commonly known as: Ubrelvy   100 mg, Oral, Once As Needed      valsartan 160 MG tablet  Commonly known as: DIOVAN   160 mg, Oral, Daily      vitamin B-12 1000 MCG tablet  Commonly known as: CYANOCOBALAMIN   1,000 mcg, Daily      Xhance 93 MCG/ACT Exhaler Suspension  Generic drug: Fluticasone Propionate   Instill 1 spray in both nostrils twice a day               Allergies   Allergen Reactions    Cefaclor Hives and Swelling     Other reaction(s): SWELLING  Shed 4  layers of skin and extremely SOB  Cesario Bishop's syndrome        Cephalexin Other (See Comments)     Cesario-Bishop's syndrome      Cephalosporins Hives, Swelling and Angioedema     Rechallenge with cefipime caused rash on 1/14/08.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    Verapamil Nausea And Vomiting     Dizzy    Zolpidem Other (See Comments) and Unknown (See Comments)     Automatic behaviour in sleep.  confusion    Amoxicillin Rash    Edecrin [Ethacrynic Acid] Rash and Other (See Comments)     Weight gain    Hydrochlorothiazide Hives    Sulfa Antibiotics Hives         Discharge Disposition:  Home or Self Care    Diet:  Hospital:  Diet Order   Procedures    Diet: Diabetic; Consistent Carbohydrate; Fluid Consistency: Thin (IDDSI 0)       Activity: as tolerated      Restrictions or Other Recommendations:       CODE STATUS:    Code Status and Medical Interventions: CPR (Attempt to Resuscitate); Full Support   Ordered at: 07/04/25 2236     Code Status (Patient has no pulse and is not breathing):    CPR (Attempt to Resuscitate)     Medical Interventions (Patient has pulse or is breathing):    Full Support       Future Appointments   Date Time Provider Department Center   7/18/2025  9:00 AM Leila Gloria APRN [facility]   7/23/2025 10:00 AM Joel Griffin APRN [facility]   7/24/2025  8:30 AM TREATMENT RM 11  [facility] OP INFUS [facility]   8/4/2025  9:00 AM Sanjeev Rawls MD [facility]  [facility] MR [facility]   8/22/2025  9:00 AM TREATMENT RM 16 BH [facility] E OP INFUS [facility] E OPI [facility]   9/23/2025  9:00 AM Mingo Lovelace MD [facility] CD BG R [facility] E   10/23/2025  9:00 AM Sanjeev Rawls MD [facility] MR [facility]       Adolfo Velazco MD  07/11/25      Time Spent on Discharge:  I spent  40  minutes on this discharge activity which included: face-to-face encounter with the patient, reviewing the data in the system, coordination of the care with the nursing staff as well as consultants, documentation, and entering orders.

## 2025-07-11 NOTE — OUTREACH NOTE
Prep Survey      Flowsheet Row Responses   [facility] patient discharged from? [facility]   Is LACE score < 7 ? No   Eligibility [facility]   Date of Admission 07/04/25   Date of Discharge 07/11/25   Discharge diagnosis Spinal stenosis   Does the patient have one of the following disease processes/diagnoses(primary or secondary)? Other   Does the patient have Home health ordered? Yes   What is the Home health agency?  outpatient PT at    Prep survey completed? Yes            Cally GASTON - Registered Nurse

## 2025-07-11 NOTE — THERAPY TREATMENT NOTE
Patient Name: Raquel Mariee  : 1955    MRN: [id number]                              Today's Date: 2025       Admit Date: 2025    Visit Dx:     ICD-10-CM ICD-9-CM   1. Spinal stenosis of lumbar region without neurogenic claudication  M48.061 724.02     Patient Active Problem List   Diagnosis    Hypogammaglobulinemia    Gastric bypass status for obesity    CKD (chronic kidney disease), stage III    KALYN (obstructive sleep apnea)    Major depressive disorder in partial remission    Hypothyroidism    Fibromyalgia syndrome    Abdominal aortic aneurysm (AAA) without rupture    Nonrheumatic aortic valve stenosis    Vitamin D deficiency, unspecified     Anatomical narrow angle borderline glaucoma    Abnormal finding of blood chemistry, unspecified     Chronic diastolic heart failure    Prinzmetal angina    Common variable agammaglobulinemia    Polymyositis    Port-A-Cath in place    Abnormal CT of the chest    Acute bronchospasm    Acute kidney injury    Sinusitis, chronic    Anemia    Aortic valve stenosis and insufficiency, rheumatic    Body mass index (BMI) of 40.0 to 44.9 in adult    Calcium kidney stones    Choroidal nevus of left eye    CAD (coronary artery disease)    Claw toe, acquired    Combined forms of age-related cataract of both eyes    Depressive disorder    Essential hypertension with goal blood pressure less than 140/90    Severe persistent asthma with exacerbation    Glaucoma suspect of both eyes    Inappropriate sinus tachycardia    Migraine without aura    Mitral valve regurgitation    Osteoarthrosis involving lower leg    Pain in joint involving lower leg    Posterior vitreous detachment of both eyes    Primary osteoarthritis of left wrist    Anxiety disorder    Status post total knee replacement    Sleep disturbances    Senile nuclear sclerosis    Rotator cuff syndrome of left shoulder    Pure hypercholesterolemia    Thoracic aortic aneurysm without rupture    TIA (transient  ischemic attack)    Tracheomalacia    Trochlear nerve palsy, left    Diabetes mellitus without complication    Urge incontinence of urine    Acute exacerbation of chronic obstructive pulmonary disease (COPD)    Internal hemorrhoid, bleeding    Spinal stenosis    Bilateral sacroiliitis     Past Medical History:   Diagnosis Date    Anxiety     Aortic valve stenosis     Cardiac murmur     Cataract, bilateral     CHF (congestive heart failure)     Cholelithiasis GB out    Chronic kidney disease     Stage III    Clostridium difficile infection     in her 30s    Colon polyp Ever since having colonoscopies.    Common variable immunodeficiency     IVIG infusions    Compression fracture of lumbar spine, non-traumatic     COPD (chronic obstructive pulmonary disease)     Coronary artery disease     Prinz metal angina & aortic stenosis    Depression     Diabetes mellitus     type II    Eosinophilic asthma     Fibromyalgia, primary     Full dentures     GERD (gastroesophageal reflux disease)     History of transfusion     in [facility] at [facility].  No reaction noted, patient states she does have an antibody from transfusion.    Hypertension     Hypogammaglobulinemia     Hypothyroidism     Irritable bowel syndrome     Migraines     Morbid obesity     Optic neuritis     Optic neuropathy     Osteoarthritis     Prinzmetal angina 1990    Pseudomonas infection 1998    lungs    PTSD (post-traumatic stress disorder)     Pulmonary edema     Renal insufficiency     Sinus tachycardia 1990    Sleep apnea     no longer uses/needs cpap    Stroke     TIA about 7 years ago    Tachycardia     TIA (transient ischemic attack) 2017    Trochanteric bursitis 01/25/2023     Past Surgical History:   Procedure Laterality Date    APPENDECTOMY      BUNIONECTOMY Bilateral     CARDIAC CATHETERIZATION  2017    no stents needed    CARPAL TUNNEL RELEASE Right     COLONOSCOPY  2021    ENDOMETRIAL ABLATION  1997    EYE SURGERY  ? About 6-8 years ago     Laser for glaucoma    FRACTURE SURGERY  1995    No hardware; Tibeal plateau    GASTRIC BYPASS      HAND SURGERY Left     No hardware    HEMORRHOIDECTOMY N/A 04/09/2024    Procedure: HEMORRHOID BANDING X2;  Surgeon: Melissa Beck MD;  Location: [facility] OR;  Service: General;  Laterality: N/A;    INTERSTIM PLACEMENT N/A 05/03/2023    Procedure: INTERSTIM STAGES 1 AND 2 LEAD AND GENERATOR PLACEMENT;  Surgeon: Abner Nation MD;  Location: [facility] OR;  Service: Urology;  Laterality: N/A;    POSTERIOR VAGINAL REPAIR N/A 08/02/2023    Procedure: POSTERIOR COLPORRHAPHY;  Surgeon: Kellen Galan MD;  Location:  [facility] OR;  Service: Gynecology;  Laterality: N/A;    RECTAL EXAMINATION UNDER ANESTHESIA N/A 04/09/2024    Procedure: RECTAL EXAM UNDER ANESTHESIA;  Surgeon: Melissa Beck MD;  Location: [facility] OR;  Service: General;  Laterality: N/A;    REPLACEMENT TOTAL KNEE BILATERAL      TEETH EXTRACTION      all of bottom teeth removed    THORACOTOMY      TONSILLECTOMY      TOTAL ABDOMINAL HYSTERECTOMY WITH SALPINGO OOPHORECTOMY      TRACHEAL SURGERY      Repaired via thoracotomy    TUBAL ABDOMINAL LIGATION  1983    VENOUS ACCESS DEVICE (PORT) INSERTION      port a cath in the left chest wall      General Information       Row Name 07/11/25 1038          Physical Therapy Time and Intention    Document Type therapy note (daily note) (P)   -ES     Mode of Treatment physical therapy;individual therapy (P)   -ES       Row Name 07/11/25 1038          General Information    Patient Profile Reviewed yes (P)   -ES     Existing Precautions/Restrictions spinal;fall;other (see comments) (P)   RLE Radicular pain  -ES     Barriers to Rehab previous functional deficit;medically complex (P)   -ES       Row Name 07/11/25 1038          Cognition    Orientation Status (Cognition) oriented x 4 (P)   -ES       Row Name 07/11/25 1038          Safety Issues/Impairments Affecting Functional Mobility    Safety Issues Affecting  Function (Mobility) awareness of need for assistance;safety precaution awareness;safety precautions follow-through/compliance;judgment (P)   -ES     Impairments Affecting Function (Mobility) endurance/activity tolerance;postural/trunk control;strength;balance (P)   -ES               User Key  (r) = Recorded By, (t) = Taken By, (c) = Cosigned By      Initials Name Provider Type    ES Tamika Thurston, PT Student PT Student                   Mobility       Row Name 07/11/25 1129          Bed Mobility    Bed Mobility supine-sit (P)   -ES     Supine-Sit [place] (Bed Mobility) modified independence (P)   -ES     Assistive Device (Bed Mobility) head of bed elevated (P)   -ES     Comment, (Bed Mobility) Emmy with HOB elevated, no assist needed to sit EOB. No inc pain. (P)   -ES       Row Name 07/11/25 1129          Sit-Stand Transfer    Sit-Stand [place] (Transfers) modified independence (P)   -ES     Assistive Device (Sit-Stand Transfers) walker, front-wheeled (P)   -ES     Comment, (Sit-Stand Transfer) No cues req for STS, no c/o increased pain with stand. (P)   -ES       Row Name 07/11/25 1129          Gait/Stairs (Locomotion)    [place] Level (Gait) standby assist (P)   -ES     Assistive Device (Gait) walker, front-wheeled (P)   -ES     Patient was able to Ambulate yes (P)   -ES     Distance in Feet (Gait) 120 (P)   -ES     Deviations/Abnormal Patterns (Gait) right sided deviations;gait speed decreased;stride length decreased;weight shifting decreased (P)   -ES     Bilateral Gait Deviations forward flexed posture (P)   -ES     Right Sided Gait Deviations heel strike decreased (P)   -ES     Comment, (Gait/Stairs) Pt ambulated with improved distance, endurance and gait pattern this session. Pt ambulated 120' with no rest breaks, good navigation with FWW, and step-through gait pattern with equilateral step-length and weight acceptance through BLE. No increased pain with ambulation. (P)   -ES                User Key  (r) = Recorded By, (t) = Taken By, (c) = Cosigned By      Initials Name Provider Type    Tamika Valentine, PT Student PT Student                   Obj/Interventions       Row Name 07/11/25 1132          Balance    Static Sitting Balance independent (P)   -ES     Dynamic Sitting Balance independent (P)   -ES     Position, Sitting Balance unsupported;sitting edge of bed (P)   -ES     Static Standing Balance standby assist (P)   -ES     Dynamic Standing Balance standby assist (P)   -ES     Position/Device Used, Standing Balance supported;walker, front-wheeled (P)   -ES     Balance Interventions supported;sit to stand;static;dynamic;standing;sitting (P)   -ES     Comment, Balance No LOB with transfers or ambulation. (P)   -ES               User Key  (r) = Recorded By, (t) = Taken By, (c) = Cosigned By      Initials Name Provider Type    Tamika Valentine, PT Student PT Student                   Goals/Plan    No documentation.                  Clinical Impression       Row Name 07/11/25 1132          Pain    Pretreatment Pain Rating 0/10 - no pain (P)   -ES     Posttreatment Pain Rating 0/10 - no pain (P)   -ES     Pre/Posttreatment Pain Comment No pain this session (P)   -ES       Row Name 07/11/25 1132          Plan of Care Review    Plan of Care Reviewed With patient (P)   -ES     Progress improving (P)   -ES     Outcome Evaluation Pt with improved ambulation distance and tolerance to mobility this session. Pt ambulated 120' with FWW and CGA with improved step-through pattern and no c/o increased pain during or after gait. IPPT remains indicated to address ongoing deficits. PT changes rec to home with assist and OPPT with no reports of pain with any mobility and increased ambulation distance. (P)   -ES       Row Name 07/11/25 1132          Vital Signs    Pre Systolic BP Rehab 148 (P)   -ES     Pre Treatment Diastolic BP 80 (P)   -ES     Pretreatment Heart Rate (beats/min) 90 (P)   -ES      Posttreatment Heart Rate (beats/min) 99 (P)   -ES     Pre Patient Position Sitting (P)   -ES     Intra Patient Position Standing (P)   -ES     Post Patient Position Sitting (P)   -ES       Row Name 07/11/25 1132          Positioning and Restraints    Pre-Treatment Position in bed (P)   -ES     Post Treatment Position bed (P)   -ES     In Bed sitting EOB;side rails up x2;encouraged to call for assist;call light within reach;notified nsg (P)   -ES               User Key  (r) = Recorded By, (t) = Taken By, (c) = Cosigned By      Initials Name Provider Type    Tamika Valentine, PT Student PT Student                   Outcome Measures       Row Name 07/11/25 1135          How much help from another person do you currently need...    Turning from your back to your side while in flat bed without using bedrails? 4 (P)   -ES     Moving from lying on back to sitting on the side of a flat bed without bedrails? 4 (P)   -ES     Moving to and from a bed to a chair (including a wheelchair)? 4 (P)   -ES     Standing up from a chair using your arms (e.g., wheelchair, bedside chair)? 4 (P)   -ES     Climbing 3-5 steps with a railing? 3 (P)   -ES     To walk in hospital room? 4 (P)   -ES     AM-PAC 6 Clicks Score (PT) 23 (P)   -ES     Highest Level of Mobility Goal Walk 25 Feet or More-7 (P)   -ES       Row Name 07/11/25 1135          Functional Assessment    Outcome Measure Options AM-PAC 6 Clicks Basic Mobility (PT) (P)   -ES               User Key  (r) = Recorded By, (t) = Taken By, (c) = Cosigned By      Initials Name Provider Type    Tamika Valentine, PT Student PT Student                                 Physical Therapy Education       Title: PT OT SLP Therapies (In Progress)       Topic: Physical Therapy (Done)       Point: Mobility training (Done)       Learning Progress Summary            Patient Acceptance, E,D, VU,NR by BOB at 7/11/2025 1135    Comment: BLE stretches  Ambulation with FWW    Acceptance, E,D, NR by  ES at 7/10/2025 1112    Acceptance, E, VU by CK at 7/9/2025 1055    Acceptance, E, VU by CK at 7/8/2025 1613    Acceptance, E,D, VU,NR by AB at 7/7/2025 1553    Eager, E,TB,D, VU,DU by SC at 7/6/2025 1600    Comment: reviewed HEP    Eager, E, VU by SC at 7/5/2025 1419    Comment: re idwed HEP                      Point: Home exercise program (Done)       Learning Progress Summary            Patient Acceptance, E,D, VU,NR by ES at 7/11/2025 1135    Comment: BLE stretches  Ambulation with FWW    Acceptance, E,D, NR by ES at 7/10/2025 1112    Acceptance, E, VU by CK at 7/9/2025 1055    Acceptance, E, VU by CK at 7/8/2025 1613    Acceptance, E,D, VU,NR by AB at 7/7/2025 1553    Eager, E,TB,D, VU,DU by SC at 7/6/2025 1600    Comment: reviewed HEP    Eager, E, VU by SC at 7/5/2025 1419    Comment: re idwed HEP                      Point: Body mechanics (Done)       Learning Progress Summary            Patient Acceptance, E,D, VU,NR by ES at 7/11/2025 1135    Comment: BLE stretches  Ambulation with FWW    Acceptance, E,D, NR by ES at 7/10/2025 1112    Acceptance, E, VU by CK at 7/9/2025 1055    Acceptance, E, VU by CK at 7/8/2025 1613    Acceptance, E,D, VU,NR by AB at 7/7/2025 1553    Eager, E,TB,D, VU,DU by SC at 7/6/2025 1600    Comment: reviewed HEP    Eager, E, VU by SC at 7/5/2025 1419    Comment: re idwed HEP                      Point: Precautions (Done)       Learning Progress Summary            Patient Acceptance, E,D, VU,NR by ES at 7/11/2025 1135    Comment: BLE stretches  Ambulation with FWW    Acceptance, E,D, NR by ES at 7/10/2025 1112    Acceptance, E, VU by CK at 7/9/2025 1055    Acceptance, E, VU by CK at 7/8/2025 1613    Acceptance, SHALOM,SHON GASTON,NR by AB at 7/7/2025 1553    SHALOM Queen,MARCO,SHON GASTON,DU by SC at 7/6/2025 1600    Comment: reviewed HEP    SHALOM Queen VU by SC at 7/5/2025 1419    Comment: re idwed HEP                                      User Key       Initials Effective Dates Name Provider Type Discipline     SC 02/03/23 -  Apolinar Butts, PT Physical Therapist PT    AB 09/22/22 -  Kelli Mar, PT Physical Therapist PT    CK 02/06/24 -  Jeanette Reynoso, PT Physical Therapist PT    ES 05/05/25 -  Tamika Thurston, PT Student PT Student PT                  PT Recommendation and Plan     Progress: (P) improving  Outcome Evaluation: (P) Pt with improved ambulation distance and tolerance to mobility this session. Pt ambulated 120' with FWW and CGA with improved step-through pattern and no c/o increased pain during or after gait. IPPT remains indicated to address ongoing deficits. PT changes rec to home with assist and OPPT with no reports of pain with any mobility and increased ambulation distance.     Time Calculation:         PT Charges       Row Name 07/11/25 1136             Time Calculation    Start Time 1038 (P)   -ES      PT Received On 07/11/25 (P)   -ES         Timed Charges    69681 - PT Therapeutic Activity Minutes 15 (P)   -ES         Total Minutes    Timed Charges Total Minutes 15 (P)   -ES       Total Minutes 15 (P)   -ES                User Key  (r) = Recorded By, (t) = Taken By, (c) = Cosigned By      Initials Name Provider Type    ES Tamika Thurston, PT Student PT Student                  Therapy Charges for Today       Code Description Service Date Service Provider Modifiers Qty    39114257346 HC PT THER PROC EA 15 MIN 7/10/2025 Tamika Thurston, PT Student GP 1    63956285720 HC GAIT TRAINING EA 15 MIN 7/10/2025 Tamika Thurston, PT Student GP 1    [id number]  PT THERAPEUTIC ACT EA 15 MIN 7/11/2025 Tamika Thurston, PT Student GP 1            PT G-Codes  Outcome Measure Options: (P) AM-PAC 6 Clicks Basic Mobility (PT)  AM-PAC 6 Clicks Score (PT): (P) 23  AM-PAC 6 Clicks Score (OT): 20  PT Discharge Summary  Anticipated Discharge Disposition (PT): (P) home with assist, home with outpatient therapy services    Tamika Thurston PT Student  7/11/2025

## 2025-07-11 NOTE — PLAN OF CARE
Goal Outcome Evaluation:  Plan of Care Reviewed With: (P) patient        Progress: (P) improving  Outcome Evaluation: (P) Pt with improved ambulation distance and tolerance to mobility this session. Pt ambulated 120' with FWW and CGA with improved step-through pattern and no c/o increased pain during or after gait. IPPT remains indicated to address ongoing deficits. PT changes rec to home with assist and OPPT with no reports of pain with any mobility and increased ambulation distance.    Anticipated Discharge Disposition (PT): (P) home with assist, home with outpatient therapy services

## 2025-07-11 NOTE — PROGRESS NOTES
[facility] Medicine Services  PROGRESS NOTE    Patient Name: Raquel Mariee  : 1955  MRN: [id number]    Date of Admission: 2025  Primary Care Physician: Sanjeev Rawls MD    Subjective   Subjective     CC: Follow-up back pain    HPI: No acute events overnight, patient feels much better, pain is controlled    Objective   Objective     Vital Signs:   Temp:  [97.7 °F (36.5 °C)-98.4 °F (36.9 °C)] 98.1 °F (36.7 °C)  Heart Rate:  [84-94] 86  Resp:  [16-18] 16  BP: (115-175)/() 115/79     Physical Exam:  Constitutional: No acute distress, awake, alert  HENT: NCAT, mucous membranes moist  Respiratory: Clear to auscultation bilaterally, respiratory effort normal   Cardiovascular: RRR, systolic murmur  Gastrointestinal: Positive bowel sounds, soft, nontender, nondistended  Musculoskeletal: No bilateral ankle edema  Psychiatric: Appropriate affect, cooperative  Neurologic: Oriented x 3, nonfocal    Results Reviewed:  LAB RESULTS:      Lab 25  0434 25  1227   WBC 5.49 6.81   HEMOGLOBIN 12.5 12.1   HEMATOCRIT 36.8 35.4   PLATELETS 205 211   NEUTROS ABS  --  5.31   IMMATURE GRANS (ABS)  --  0.04   LYMPHS ABS  --  0.92   MONOS ABS  --  0.51   EOS ABS  --  0.00   MCV 95.8 93.7         Lab 25  0434 25  1227   SODIUM 131* 127*   POTASSIUM 4.4 4.5   CHLORIDE 96* 93*   CO2 23.7 22.8   ANION GAP 11.3 11.2   BUN 10.2 15.0   CREATININE 0.87 0.95   EGFR 72.2 65.0   GLUCOSE 160* 130*   CALCIUM 8.6 8.9   MAGNESIUM 2.0  --    TSH 1.450  --          Lab 25  1227   TOTAL PROTEIN 7.0   ALBUMIN 3.8   GLOBULIN 3.2   ALT (SGPT) 18   AST (SGOT) 30   BILIRUBIN 0.4   ALK PHOS 44                     Brief Urine Lab Results  (Last result in the past 365 days)        Color   Clarity   Blood   Leuk Est   Nitrite   Protein   CREAT   Urine HCG        25 1327 Yellow   Clear   Trace   Small (1+)   Negative   Negative                   Microbiology Results Abnormal        None            No radiology results from the last 24 hrs    Results for orders placed in visit on 03/28/25    Adult Transthoracic Echo Complete W/ Cont if Necessary Per Protocol 03/31/2025  2:42 PM    Interpretation Summary  1.  Normal left ventricular size and systolic function, LVEF 50-55%.  2.  Moderate to severe concentric LVH.  3.  Grade 2 diastolic dysfunction.  4.  Normal right ventricular size and systolic function.  5.  Moderately increased left atrial volume index.  6.  Moderate calcification of the aortic valve with moderate aortic stenosis.  7.  Moderate aortic regurgitation.  8.  Mild mitral regurgitation.      Current medications:  Scheduled Meds:allopurinol, 100 mg, Oral, Daily  arformoterol, 15 mcg, Nebulization, BID - RT   And  budesonide, 0.5 mg, Nebulization, BID - RT   And  revefenacin, 175 mcg, Nebulization, Daily - RT  cetirizine, 5 mg, Oral, Daily  cholecalciferol, 1,000 Units, Oral, Daily  DULoxetine, 60 mg, Oral, Daily  ferrous sulfate, 325 mg, Oral, Daily With Breakfast  fluticasone, 2 spray, Each Nare, Daily  HYDROcodone-acetaminophen, 1 tablet, Oral, Q4H  insulin lispro, 2-7 Units, Subcutaneous, 4x Daily AC & at Bedtime  isosorbide mononitrate, 30 mg, Oral, QAM  levothyroxine, 50 mcg, Oral, Daily  montelukast, 10 mg, Oral, Nightly  multivitamin with minerals, 1 tablet, Oral, Daily  pantoprazole, 40 mg, Oral, Q AM  polyethylene glycol, 17 g, Oral, Daily  pregabalin, 100 mg, Oral, Q8H  sodium chloride, 10 mL, Intravenous, Q12H  valsartan, 160 mg, Oral, Daily  vitamin B-12, 1,000 mcg, Oral, Daily      Continuous Infusions:   PRN Meds:.  acetaminophen **OR** acetaminophen **OR** acetaminophen    albuterol    senna-docusate sodium **AND** polyethylene glycol **AND** bisacodyl **AND** bisacodyl    Calcium Replacement - Follow Nurse / BPA Driven Protocol    dextrose    dextrose    furosemide    glucagon (human recombinant)    hydrALAZINE    HYDROmorphone **AND** naloxone    Magnesium Standard  Dose Replacement - Follow Nurse / BPA Driven Protocol    Morphine **AND** naloxone    ondansetron ODT    Phosphorus Replacement - Follow Nurse / BPA Driven Protocol    Potassium Replacement - Follow Nurse / BPA Driven Protocol    sodium chloride    sodium chloride    tiZANidine    Assessment & Plan   Assessment & Plan     Active Hospital Problems    Diagnosis  POA    **Spinal stenosis [M48.00]  Yes    Bilateral sacroiliitis [M46.1]  Yes    Aortic valve stenosis and insufficiency, rheumatic [I06.2]  Yes    CAD (coronary artery disease) [I25.10]  Yes    Chronic diastolic heart failure [I50.32]  Yes    KALYN (obstructive sleep apnea) [G47.33]  Yes    CKD (chronic kidney disease), stage III [N18.30]  Yes    Hypothyroidism [E03.9]  Yes      Resolved Hospital Problems   No resolved problems to display.        Brief Hospital Course to date:  Raquel Mariee is a 69 y.o. female with history of HFpEF, CVID on IVIg monthly, DMII, Neuropathy, anemia, GERD, depression and hypothyroid who presents with back and radicular pain in the right leg.     Lumbar radiculopathy  - patient seen by Neurosurgery, patient with disc extrusion at L2/3  - continue conservative management, Lyrica increased to 100 mg every 8 hours, seems to be tolerating this well, continue to closely monitor.  - PT/OT following, recommend rehab, CM following  - follow up with Neurosurgery in clinic as an outpatient in 2 weeks     Hyponatremia  - sodium 127 at admission, improved to 131   - asymptomatic and appears chronic, serum Osm 281  - Continue as needed Lasix     Asthma  - continue nebs     UTI vs asymptomatic bacteriuria  - culture shows 100K [place], however only 6-10 WBCs and trace bacteria on UA  - afebrile, no leukocytosis, denies dysuria or increased frequency  - multiple drug allergies, will defer antibiotics at present, monitor clinically,      Chronic HFpEF   Hypertension  - Currently seems compensated   -Continue Imdur, valsartan  - Continue  home prn Lasix     Well-controlled type 2 diabetes,   Neuropathy  -FSBG's reviewed and appropriate  -Continue SSI     CVID  - on monthly IVIg through her Immunologist     Iron def anemia  -Continue iron supplement     GERD-continue PPI     Depression  -Continue  Cymbalta, she is no longer taking Wellbutrin     Hypothyroidism  - levothyroxine  - TSH 1.4     Expected Discharge Location and Transportation: Inpatient rehab  Expected Discharge   Expected Discharge Date: 7/9/2025; Expected Discharge Time:      VTE Prophylaxis:  No VTE prophylaxis order currently exists.         AM-PAC 6 Clicks Score (PT): 23 (07/10/25 2000)    CODE STATUS:   Code Status and Medical Interventions: CPR (Attempt to Resuscitate); Full Support   Ordered at: 07/04/25 1760     Code Status (Patient has no pulse and is not breathing):    CPR (Attempt to Resuscitate)     Medical Interventions (Patient has pulse or is breathing):    Full Support       Adolfo Velazco MD  07/11/25

## 2025-07-11 NOTE — CASE MANAGEMENT/SOCIAL WORK
Discharge Planning Assessment  [facility]     Patient Name: Raquel Mariee  MRN: [id number]  Today's Date: 7/11/2025    Admit Date: 7/4/2025    Plan: Home with 's assistance, and outpatient PT at [facility]       Discharge Plan       Row Name 07/11/25 1134       Plan    Plan Home with 's assistance, and outpatient PT at [facility]      Plan Comments Followed up with Ms. Mariee, at the bedside, for discharge planning.    Ms. Mariee has decided to go home with outpatient PT, rather then go to rehab at [facility].  Notified Arianna, at [place], to cancel referral.  The patient requested [facility].  Orders placed in [facility].   Rich will contact the patient to schedule her appointment.    She states that she already has a rolling walker, bedside commode and shower chair at home, and denies any additional DME needs.  NATALY Mariee states that her  will be available to assist her at home, as needed, and will be transporting her home when discharged.    Final Discharge Disposition Code 01 - home or self-care                Therapy       Service Provider Request Status Services Address Phone Fax Patient Preferred    [facility] PHYSICAL THERAPY [place]  Selected Outpatient Rehabilitation [address] [facility] [id number] [phone] --                         Expected Discharge Date and Time       Expected Discharge Date Expected Discharge Time    Jul 11, 2025               Malissa Craven RN

## 2025-07-14 ENCOUNTER — TRANSITIONAL CARE MANAGEMENT TELEPHONE ENCOUNTER (OUTPATIENT)
Dept: CALL CENTER | Facility: HOSPITAL | Age: 70
End: 2025-07-14
Payer: MEDICARE

## 2025-07-14 ENCOUNTER — TELEPHONE (OUTPATIENT)
Dept: NEUROSURGERY | Facility: CLINIC | Age: 70
End: 2025-07-14
Payer: MEDICARE

## 2025-07-14 DIAGNOSIS — M48.061 SPINAL STENOSIS OF LUMBAR REGION WITHOUT NEUROGENIC CLAUDICATION: Primary | ICD-10-CM

## 2025-07-14 RX ORDER — HYDROCODONE BITARTRATE AND ACETAMINOPHEN 5; 325 MG/1; MG/1
1 TABLET ORAL EVERY 6 HOURS PRN
Qty: 25 TABLET | Refills: 0 | Status: SHIPPED | OUTPATIENT
Start: 2025-07-14 | End: 2025-07-21 | Stop reason: SDUPTHER

## 2025-07-14 RX ORDER — PREGABALIN 100 MG/1
100 CAPSULE ORAL EVERY 8 HOURS SCHEDULED
Qty: 9 CAPSULE | Refills: 0 | Status: CANCELLED | OUTPATIENT
Start: 2025-07-14 | End: 2025-07-17

## 2025-07-14 RX ORDER — PREGABALIN 100 MG/1
100 CAPSULE ORAL EVERY 8 HOURS SCHEDULED
Qty: 90 CAPSULE | Refills: 0 | Status: SHIPPED | OUTPATIENT
Start: 2025-07-14 | End: 2025-08-13

## 2025-07-14 RX ORDER — HYDROCODONE BITARTRATE AND ACETAMINOPHEN 5; 325 MG/1; MG/1
1 TABLET ORAL EVERY 6 HOURS PRN
Qty: 25 TABLET | Refills: 0 | Status: CANCELLED | OUTPATIENT
Start: 2025-07-14 | End: 2025-07-17

## 2025-07-14 RX ORDER — HYDROCODONE BITARTRATE AND ACETAMINOPHEN 5; 325 MG/1; MG/1
1 TABLET ORAL EVERY 6 HOURS PRN
Qty: 12 TABLET | Refills: 0 | Status: CANCELLED | OUTPATIENT
Start: 2025-07-14 | End: 2025-07-17

## 2025-07-14 NOTE — TELEPHONE ENCOUNTER
Patient recently discharged from inpatient, called to request refills of Lyrica and Hydrocodone.    In addition to the refill requests, she called back and asked about an epidural injection. States she was told she would be scheduled for an injection on 7/15/25, but the order was never placed. On review of her inpatient notes, I see no mention of epidural injections, but there is note that she should have a myelogram before her follow up appointment, that is not ordered.     Charly:    1 07/11/2025 376304 Hydrocodone Bitartrate/Ac   325MG/5MG  [facility]   RETAIL PHARMACY  12.00 3 20 03 [place]  New 07/11/2025 [facility]  1 07/11/2025 [id number] Pregabalin 100MG [facility]   RETAIL PHARMACY  9.00 3 04 [place]  New 07/11/2025 [facility]  1 07/01/2025 [id number] Acetaminophen/Codeine [place]   300MG/30MG  [facility]   PROFESSIONAL PHA  14.00 3 21 03 [place]  New 07/01/2025 [facility]

## 2025-07-14 NOTE — OUTREACH NOTE
Call Center TCM Note      Flowsheet Row Responses   [facility] patient discharged from? [place]   Does the patient have one of the following disease processes/diagnoses(primary or secondary)? Other   TCM attempt successful? No   Unsuccessful attempts Attempt 1  [ PCP verbal release for Spouse--Bashir Mariee and Daughter--Cally Oliva]            Melissa Rojas Registered Nurse    7/14/2025, 11:24 EDT

## 2025-07-14 NOTE — TELEPHONE ENCOUNTER
PT CALLED TO CHECK IF HER RX FOR HYDROCODONE-ACETAMINOPHEN (NORCO) 5-325 MG ORDER HAS BEEN SENT TO PHARMACY- [facility] [address] [place] [address] [address] [facility] [phone]    PT CONTACT: [phone]

## 2025-07-14 NOTE — OUTREACH NOTE
Call Center TCM Note      Flowsheet Row Responses   [facility] patient discharged from? [place]   Does the patient have one of the following disease processes/diagnoses(primary or secondary)? Other   TCM attempt successful? Yes   Call start time 1504   Call end time 1515   Discharge diagnosis Spinal stenosis   Person spoke with today (if not patient) and relationship Patient   Medication alerts for this patient [place], Lyrica   Meds reviewed with patient/caregiver? Yes   Is the patient having any side effects they believe may be caused by any medication additions or changes? No   Does the patient have all medications ordered at discharge? Yes   Prescription comments Patient states she was supposed to have an epidural for pain relief tomorrow, but insurance denied it. She states she will run out of the pain medication Norco and Lyrica today. she has called Neurosurgery and advised them and requested more medication refills. Advised patient to call again by 4PM if she did not hear back. Advised patient if she does not get any medications or is still having severe back pain to seek care in the ED.   Is the patient taking all medications as directed (includes completed medication regime)? Yes   Comments Patient declines a PCP HOSPITAL f/u appt at time of call. She will f/u with Neurosurgery at this time.   Does the patient have an appointment with their PCP within 7-14 days of discharge? No   Nursing Interventions Patient declined scheduling/rescheduling appointment at this time, Patient desires to follow up with specialty only   What is the Home health agency?  outpatient PT at [facility]   Psychosocial issues? No   Comments Patient reports pain is still severe in back and down right leg.   Did the patient receive a copy of their discharge instructions? Yes   Nursing interventions Reviewed instructions with patient   What is the patient's perception of their health status since discharge? Same   Is  the patient/caregiver able to teach back signs and symptoms related to disease process for when to call PCP? Yes   Is the patient/caregiver able to teach back signs and symptoms related to disease process for when to call 911? Yes   Is the patient/caregiver able to teach back the hierarchy of who to call/visit for symptoms/problems? PCP, Specialist, Home health nurse, Urgent Care, ED, 911 Yes   If the patient is a current smoker, are they able to teach back resources for cessation? Not a smoker   TCM call completed? Yes   Wrap up additional comments Patient declines a PCP HOSPITAL f/u appt at time of call. She will f/u with Neurosurgery at this time.   Call end time 1515   Would this patient benefit from a Referral to [facility] Social Work? No   Is the patient interested in additional calls from an ambulatory ? No            Melissa STRINGER - Registered Nurse    7/14/2025, 15:19 EDT

## 2025-07-14 NOTE — TELEPHONE ENCOUNTER
Caller: CHIQUI    Relationship: SELF    Best call back number: [phone]      Requested Prescriptions:   HYDROcodone-acetaminophen (NORCO) 5-325 MG per tablet (07/11/2025)        pregabalin (LYRICA) 100 MG capsule (07/11/2025)     Pharmacy where request should be sent:    [facility]     Last office visit with prescribing clinician: Visit date not found   Last telemedicine visit with prescribing clinician: Visit date not found   Next office visit with prescribing clinician: 8/1/2025       Does the patient have less than a 3 day supply:  [x] Yes  [] No        Denae Moore MA   07/14/25 12:54 EDT

## 2025-07-15 ENCOUNTER — PATIENT MESSAGE (OUTPATIENT)
Dept: INTERNAL MEDICINE | Facility: CLINIC | Age: 70
End: 2025-07-15
Payer: MEDICARE

## 2025-07-15 NOTE — TELEPHONE ENCOUNTER
Patient called back.  She will go ahead and see pulmonology on Friday since she has the appt and see if they will treat her.  If they will not she will call back and make an apt with Dr. Rawls

## 2025-07-16 ENCOUNTER — HOSPITAL ENCOUNTER (INPATIENT)
Facility: HOSPITAL | Age: 70
LOS: 1 days | Discharge: HOME | DRG: 291 | End: 2025-07-18
Attending: STUDENT IN AN ORGANIZED HEALTH CARE EDUCATION/TRAINING PROGRAM | Admitting: FAMILY MEDICINE
Payer: MEDICARE

## 2025-07-16 ENCOUNTER — APPOINTMENT (OUTPATIENT)
Dept: GENERAL RADIOLOGY | Facility: HOSPITAL | Age: 70
DRG: 291 | End: 2025-07-16
Payer: MEDICARE

## 2025-07-16 ENCOUNTER — TELEPHONE (OUTPATIENT)
Dept: NEUROSURGERY | Facility: CLINIC | Age: 70
End: 2025-07-16
Payer: MEDICARE

## 2025-07-16 DIAGNOSIS — E87.1 HYPONATREMIA: ICD-10-CM

## 2025-07-16 DIAGNOSIS — I50.32 CHRONIC DIASTOLIC HEART FAILURE: ICD-10-CM

## 2025-07-16 DIAGNOSIS — R06.01 ORTHOPNEA: ICD-10-CM

## 2025-07-16 DIAGNOSIS — J81.0 ACUTE PULMONARY EDEMA: Primary | ICD-10-CM

## 2025-07-16 DIAGNOSIS — M46.1 BILATERAL SACROILIITIS: Chronic | ICD-10-CM

## 2025-07-16 LAB
ALBUMIN SERPL-MCNC: 3.7 G/DL (ref 3.5–5.2)
ALBUMIN/GLOB SERPL: 1.4 G/DL
ALP SERPL-CCNC: 47 U/L (ref 39–117)
ALT SERPL W P-5'-P-CCNC: 25 U/L (ref 1–33)
ANION GAP SERPL CALCULATED.3IONS-SCNC: 11.6 MMOL/L (ref 5–15)
AST SERPL-CCNC: 36 U/L (ref 1–32)
BASOPHILS # BLD AUTO: 0.02 10*3/MM3 (ref 0–0.2)
BASOPHILS NFR BLD AUTO: 0.2 % (ref 0–1.5)
BILIRUB SERPL-MCNC: 0.4 MG/DL (ref 0–1.2)
BUN SERPL-MCNC: 15 MG/DL (ref 8–23)
BUN/CREAT SERPL: 15.6 (ref 7–25)
CALCIUM SPEC-SCNC: 8.4 MG/DL (ref 8.6–10.5)
CHLORIDE SERPL-SCNC: 88 MMOL/L (ref 98–107)
CO2 SERPL-SCNC: 21.4 MMOL/L (ref 22–29)
CREAT SERPL-MCNC: 0.96 MG/DL (ref 0.57–1)
DEPRECATED RDW RBC AUTO: 43.9 FL (ref 37–54)
EGFRCR SERPLBLD CKD-EPI 2021: 64.2 ML/MIN/1.73
EOSINOPHIL # BLD AUTO: 0.01 10*3/MM3 (ref 0–0.4)
EOSINOPHIL NFR BLD AUTO: 0.1 % (ref 0.3–6.2)
ERYTHROCYTE [DISTWIDTH] IN BLOOD BY AUTOMATED COUNT: 12.9 % (ref 12.3–15.4)
GLOBULIN UR ELPH-MCNC: 2.6 GM/DL
GLUCOSE SERPL-MCNC: 134 MG/DL (ref 65–99)
HCT VFR BLD AUTO: 29.4 % (ref 34–46.6)
HGB BLD-MCNC: 10.2 G/DL (ref 12–15.9)
HOLD SPECIMEN: NORMAL
HOLD SPECIMEN: NORMAL
IMM GRANULOCYTES # BLD AUTO: 0.07 10*3/MM3 (ref 0–0.05)
IMM GRANULOCYTES NFR BLD AUTO: 0.8 % (ref 0–0.5)
LYMPHOCYTES # BLD AUTO: 1.08 10*3/MM3 (ref 0.7–3.1)
LYMPHOCYTES NFR BLD AUTO: 12.9 % (ref 19.6–45.3)
MCH RBC QN AUTO: 32.3 PG (ref 26.6–33)
MCHC RBC AUTO-ENTMCNC: 34.7 G/DL (ref 31.5–35.7)
MCV RBC AUTO: 93 FL (ref 79–97)
MONOCYTES # BLD AUTO: 0.69 10*3/MM3 (ref 0.1–0.9)
MONOCYTES NFR BLD AUTO: 8.2 % (ref 5–12)
NEUTROPHILS NFR BLD AUTO: 6.53 10*3/MM3 (ref 1.7–7)
NEUTROPHILS NFR BLD AUTO: 77.8 % (ref 42.7–76)
NRBC BLD AUTO-RTO: 0 /100 WBC (ref 0–0.2)
NT-PROBNP SERPL-MCNC: 6154 PG/ML (ref 0–900)
PLATELET # BLD AUTO: 218 10*3/MM3 (ref 140–450)
PMV BLD AUTO: 9.4 FL (ref 6–12)
POTASSIUM SERPL-SCNC: 4.5 MMOL/L (ref 3.5–5.2)
PROT SERPL-MCNC: 6.3 G/DL (ref 6–8.5)
RBC # BLD AUTO: 3.16 10*6/MM3 (ref 3.77–5.28)
SODIUM SERPL-SCNC: 121 MMOL/L (ref 136–145)
TROPONIN T SERPL HS-MCNC: 35 NG/L
WBC NRBC COR # BLD AUTO: 8.4 10*3/MM3 (ref 3.4–10.8)
WHOLE BLOOD HOLD COAG: NORMAL
WHOLE BLOOD HOLD SPECIMEN: NORMAL

## 2025-07-16 PROCEDURE — 84484 ASSAY OF TROPONIN QUANT: CPT | Performed by: STUDENT IN AN ORGANIZED HEALTH CARE EDUCATION/TRAINING PROGRAM

## 2025-07-16 PROCEDURE — 71045 X-RAY EXAM CHEST 1 VIEW: CPT

## 2025-07-16 PROCEDURE — 83880 ASSAY OF NATRIURETIC PEPTIDE: CPT | Performed by: STUDENT IN AN ORGANIZED HEALTH CARE EDUCATION/TRAINING PROGRAM

## 2025-07-16 PROCEDURE — 94640 AIRWAY INHALATION TREATMENT: CPT

## 2025-07-16 PROCEDURE — 36415 COLL VENOUS BLD VENIPUNCTURE: CPT

## 2025-07-16 PROCEDURE — 99285 EMERGENCY DEPT VISIT HI MDM: CPT | Performed by: STUDENT IN AN ORGANIZED HEALTH CARE EDUCATION/TRAINING PROGRAM

## 2025-07-16 PROCEDURE — 93005 ELECTROCARDIOGRAM TRACING: CPT | Performed by: STUDENT IN AN ORGANIZED HEALTH CARE EDUCATION/TRAINING PROGRAM

## 2025-07-16 PROCEDURE — 85025 COMPLETE CBC W/AUTO DIFF WBC: CPT | Performed by: STUDENT IN AN ORGANIZED HEALTH CARE EDUCATION/TRAINING PROGRAM

## 2025-07-16 PROCEDURE — 99223 1ST HOSP IP/OBS HIGH 75: CPT | Performed by: FAMILY MEDICINE

## 2025-07-16 PROCEDURE — 80053 COMPREHEN METABOLIC PANEL: CPT | Performed by: STUDENT IN AN ORGANIZED HEALTH CARE EDUCATION/TRAINING PROGRAM

## 2025-07-16 PROCEDURE — 25010000002 FUROSEMIDE PER 20 MG: Performed by: STUDENT IN AN ORGANIZED HEALTH CARE EDUCATION/TRAINING PROGRAM

## 2025-07-16 RX ORDER — SODIUM CHLORIDE 0.9 % (FLUSH) 0.9 %
10 SYRINGE (ML) INJECTION AS NEEDED
Status: DISCONTINUED | OUTPATIENT
Start: 2025-07-16 | End: 2025-07-18 | Stop reason: HOSPADM

## 2025-07-16 RX ORDER — IPRATROPIUM BROMIDE AND ALBUTEROL SULFATE 2.5; .5 MG/3ML; MG/3ML
3 SOLUTION RESPIRATORY (INHALATION) ONCE
Status: COMPLETED | OUTPATIENT
Start: 2025-07-16 | End: 2025-07-16

## 2025-07-16 RX ORDER — FUROSEMIDE 10 MG/ML
80 INJECTION INTRAMUSCULAR; INTRAVENOUS ONCE
Status: COMPLETED | OUTPATIENT
Start: 2025-07-16 | End: 2025-07-16

## 2025-07-16 RX ADMIN — IPRATROPIUM BROMIDE AND ALBUTEROL SULFATE 3 ML: 2.5; .5 SOLUTION RESPIRATORY (INHALATION) at 22:19

## 2025-07-16 RX ADMIN — FUROSEMIDE 80 MG: 10 INJECTION, SOLUTION INTRAMUSCULAR; INTRAVENOUS at 23:04

## 2025-07-16 NOTE — TELEPHONE ENCOUNTER
I have sent in a PA for Lyrica.  This has been denied I have tried to call the patient, no answer no VM.  
coffee

## 2025-07-17 ENCOUNTER — APPOINTMENT (OUTPATIENT)
Dept: CARDIOLOGY | Facility: HOSPITAL | Age: 70
DRG: 291 | End: 2025-07-17
Payer: MEDICARE

## 2025-07-17 ENCOUNTER — READMISSION MANAGEMENT (OUTPATIENT)
Dept: CALL CENTER | Facility: HOSPITAL | Age: 70
End: 2025-07-17
Payer: MEDICARE

## 2025-07-17 LAB
ANION GAP SERPL CALCULATED.3IONS-SCNC: 10 MMOL/L (ref 5–15)
ANION GAP SERPL CALCULATED.3IONS-SCNC: 12.5 MMOL/L (ref 5–15)
ANION GAP SERPL CALCULATED.3IONS-SCNC: 12.7 MMOL/L (ref 5–15)
BUN SERPL-MCNC: 16 MG/DL (ref 8–23)
BUN/CREAT SERPL: 13.3 (ref 7–25)
BUN/CREAT SERPL: 18 (ref 7–25)
BUN/CREAT SERPL: 18.4 (ref 7–25)
CALCIUM SPEC-SCNC: 8.4 MG/DL (ref 8.6–10.5)
CALCIUM SPEC-SCNC: 8.6 MG/DL (ref 8.6–10.5)
CALCIUM SPEC-SCNC: 8.8 MG/DL (ref 8.6–10.5)
CHLORIDE SERPL-SCNC: 85 MMOL/L (ref 98–107)
CHLORIDE SERPL-SCNC: 86 MMOL/L (ref 98–107)
CHLORIDE SERPL-SCNC: 88 MMOL/L (ref 98–107)
CO2 SERPL-SCNC: 24.3 MMOL/L (ref 22–29)
CO2 SERPL-SCNC: 24.5 MMOL/L (ref 22–29)
CO2 SERPL-SCNC: 26 MMOL/L (ref 22–29)
CREAT SERPL-MCNC: 0.87 MG/DL (ref 0.57–1)
CREAT SERPL-MCNC: 0.89 MG/DL (ref 0.57–1)
CREAT SERPL-MCNC: 1.2 MG/DL (ref 0.57–1)
CREAT UR-MCNC: 16.1 MG/DL
EGFRCR SERPLBLD CKD-EPI 2021: 49.1 ML/MIN/1.73
EGFRCR SERPLBLD CKD-EPI 2021: 70.3 ML/MIN/1.73
EGFRCR SERPLBLD CKD-EPI 2021: 72.2 ML/MIN/1.73
GEN 5 1HR TROPONIN T REFLEX: 39 NG/L
GLUCOSE SERPL-MCNC: 161 MG/DL (ref 65–99)
GLUCOSE SERPL-MCNC: 171 MG/DL (ref 65–99)
GLUCOSE SERPL-MCNC: 79 MG/DL (ref 65–99)
OSMOLALITY SERPL: 263 MOSM/KG (ref 280–301)
OSMOLALITY UR: 149 MOSM/KG
POTASSIUM SERPL-SCNC: 3.9 MMOL/L (ref 3.5–5.2)
POTASSIUM SERPL-SCNC: 3.9 MMOL/L (ref 3.5–5.2)
POTASSIUM SERPL-SCNC: 4.5 MMOL/L (ref 3.5–5.2)
SODIUM SERPL-SCNC: 122 MMOL/L (ref 136–145)
SODIUM SERPL-SCNC: 123 MMOL/L (ref 136–145)
SODIUM SERPL-SCNC: 124 MMOL/L (ref 136–145)
SODIUM UR-SCNC: 29 MMOL/L
TROPONIN T % DELTA: 11
TROPONIN T NUMERIC DELTA: 4 NG/L

## 2025-07-17 PROCEDURE — 82570 ASSAY OF URINE CREATININE: CPT | Performed by: INTERNAL MEDICINE

## 2025-07-17 PROCEDURE — 94799 UNLISTED PULMONARY SVC/PX: CPT

## 2025-07-17 PROCEDURE — 25010000002 HEPARIN (PORCINE) PER 1000 UNITS: Performed by: FAMILY MEDICINE

## 2025-07-17 PROCEDURE — 99232 SBSQ HOSP IP/OBS MODERATE 35: CPT | Performed by: INTERNAL MEDICINE

## 2025-07-17 PROCEDURE — 83935 ASSAY OF URINE OSMOLALITY: CPT | Performed by: INTERNAL MEDICINE

## 2025-07-17 PROCEDURE — 80048 BASIC METABOLIC PNL TOTAL CA: CPT | Performed by: INTERNAL MEDICINE

## 2025-07-17 PROCEDURE — 63710000001 PREDNISONE PER 1 MG: Performed by: INTERNAL MEDICINE

## 2025-07-17 PROCEDURE — 94761 N-INVAS EAR/PLS OXIMETRY MLT: CPT

## 2025-07-17 PROCEDURE — 25010000002 FUROSEMIDE PER 20 MG: Performed by: FAMILY MEDICINE

## 2025-07-17 PROCEDURE — 84300 ASSAY OF URINE SODIUM: CPT | Performed by: INTERNAL MEDICINE

## 2025-07-17 PROCEDURE — 83930 ASSAY OF BLOOD OSMOLALITY: CPT | Performed by: INTERNAL MEDICINE

## 2025-07-17 RX ORDER — LEVOTHYROXINE SODIUM 50 UG/1
50 TABLET ORAL DAILY
Status: DISCONTINUED | OUTPATIENT
Start: 2025-07-17 | End: 2025-07-18 | Stop reason: HOSPADM

## 2025-07-17 RX ORDER — ACETAMINOPHEN 325 MG/1
650 TABLET ORAL EVERY 4 HOURS PRN
Status: DISCONTINUED | OUTPATIENT
Start: 2025-07-17 | End: 2025-07-18 | Stop reason: HOSPADM

## 2025-07-17 RX ORDER — ONDANSETRON 4 MG/1
4 TABLET, ORALLY DISINTEGRATING ORAL EVERY 8 HOURS PRN
Status: DISCONTINUED | OUTPATIENT
Start: 2025-07-17 | End: 2025-07-18 | Stop reason: HOSPADM

## 2025-07-17 RX ORDER — ECHINACEA PURPUREA EXTRACT 125 MG
2 TABLET ORAL
Status: DISCONTINUED | OUTPATIENT
Start: 2025-07-17 | End: 2025-07-18 | Stop reason: HOSPADM

## 2025-07-17 RX ORDER — POLYETHYLENE GLYCOL 3350 17 G/17G
17 POWDER, FOR SOLUTION ORAL DAILY PRN
Status: DISCONTINUED | OUTPATIENT
Start: 2025-07-17 | End: 2025-07-18 | Stop reason: HOSPADM

## 2025-07-17 RX ORDER — MONTELUKAST SODIUM 10 MG/1
10 TABLET ORAL NIGHTLY
Status: DISCONTINUED | OUTPATIENT
Start: 2025-07-17 | End: 2025-07-18 | Stop reason: HOSPADM

## 2025-07-17 RX ORDER — BISACODYL 10 MG
10 SUPPOSITORY, RECTAL RECTAL DAILY PRN
Status: DISCONTINUED | OUTPATIENT
Start: 2025-07-17 | End: 2025-07-18 | Stop reason: HOSPADM

## 2025-07-17 RX ORDER — IPRATROPIUM BROMIDE AND ALBUTEROL SULFATE 2.5; .5 MG/3ML; MG/3ML
3 SOLUTION RESPIRATORY (INHALATION) EVERY 4 HOURS PRN
Status: DISCONTINUED | OUTPATIENT
Start: 2025-07-17 | End: 2025-07-18 | Stop reason: HOSPADM

## 2025-07-17 RX ORDER — HYDROCODONE BITARTRATE AND ACETAMINOPHEN 5; 325 MG/1; MG/1
1 TABLET ORAL EVERY 6 HOURS PRN
Status: DISCONTINUED | OUTPATIENT
Start: 2025-07-17 | End: 2025-07-18 | Stop reason: HOSPADM

## 2025-07-17 RX ORDER — ISOSORBIDE MONONITRATE 30 MG/1
30 TABLET, EXTENDED RELEASE ORAL EVERY MORNING
Status: DISCONTINUED | OUTPATIENT
Start: 2025-07-17 | End: 2025-07-18 | Stop reason: HOSPADM

## 2025-07-17 RX ORDER — AMOXICILLIN 250 MG
2 CAPSULE ORAL 2 TIMES DAILY PRN
Status: DISCONTINUED | OUTPATIENT
Start: 2025-07-17 | End: 2025-07-18 | Stop reason: HOSPADM

## 2025-07-17 RX ORDER — FLUTICASONE PROPIONATE 50 MCG
2 SPRAY, SUSPENSION (ML) NASAL DAILY
Status: DISCONTINUED | OUTPATIENT
Start: 2025-07-17 | End: 2025-07-17 | Stop reason: SDUPTHER

## 2025-07-17 RX ORDER — SODIUM CHLORIDE 9 MG/ML
40 INJECTION, SOLUTION INTRAVENOUS AS NEEDED
Status: DISCONTINUED | OUTPATIENT
Start: 2025-07-17 | End: 2025-07-18 | Stop reason: HOSPADM

## 2025-07-17 RX ORDER — ALLOPURINOL 100 MG/1
100 TABLET ORAL DAILY
Status: DISCONTINUED | OUTPATIENT
Start: 2025-07-17 | End: 2025-07-18 | Stop reason: HOSPADM

## 2025-07-17 RX ORDER — ACETAMINOPHEN 650 MG/1
650 SUPPOSITORY RECTAL EVERY 4 HOURS PRN
Status: DISCONTINUED | OUTPATIENT
Start: 2025-07-17 | End: 2025-07-18 | Stop reason: HOSPADM

## 2025-07-17 RX ORDER — FERROUS SULFATE 324(65)MG
324 TABLET, DELAYED RELEASE (ENTERIC COATED) ORAL
Status: DISCONTINUED | OUTPATIENT
Start: 2025-07-17 | End: 2025-07-18 | Stop reason: HOSPADM

## 2025-07-17 RX ORDER — NITROGLYCERIN 0.4 MG/1
0.4 TABLET SUBLINGUAL
Status: DISCONTINUED | OUTPATIENT
Start: 2025-07-17 | End: 2025-07-18 | Stop reason: HOSPADM

## 2025-07-17 RX ORDER — HEPARIN SODIUM 5000 [USP'U]/ML
5000 INJECTION, SOLUTION INTRAVENOUS; SUBCUTANEOUS EVERY 12 HOURS SCHEDULED
Status: DISCONTINUED | OUTPATIENT
Start: 2025-07-17 | End: 2025-07-17

## 2025-07-17 RX ORDER — PREDNISONE 20 MG/1
40 TABLET ORAL
Status: DISCONTINUED | OUTPATIENT
Start: 2025-07-17 | End: 2025-07-18 | Stop reason: HOSPADM

## 2025-07-17 RX ORDER — BUDESONIDE AND FORMOTEROL FUMARATE DIHYDRATE 160; 4.5 UG/1; UG/1
2 AEROSOL RESPIRATORY (INHALATION)
Status: DISCONTINUED | OUTPATIENT
Start: 2025-07-17 | End: 2025-07-18 | Stop reason: HOSPADM

## 2025-07-17 RX ORDER — VALSARTAN 80 MG/1
160 TABLET ORAL DAILY
Status: DISCONTINUED | OUTPATIENT
Start: 2025-07-17 | End: 2025-07-18 | Stop reason: HOSPADM

## 2025-07-17 RX ORDER — ACETAMINOPHEN 160 MG/5ML
650 SOLUTION ORAL EVERY 4 HOURS PRN
Status: DISCONTINUED | OUTPATIENT
Start: 2025-07-17 | End: 2025-07-18 | Stop reason: HOSPADM

## 2025-07-17 RX ORDER — HEPARIN SODIUM 5000 [USP'U]/ML
5000 INJECTION, SOLUTION INTRAVENOUS; SUBCUTANEOUS EVERY 12 HOURS
Status: DISCONTINUED | OUTPATIENT
Start: 2025-07-17 | End: 2025-07-18 | Stop reason: HOSPADM

## 2025-07-17 RX ORDER — FUROSEMIDE 10 MG/ML
20 INJECTION INTRAMUSCULAR; INTRAVENOUS
Status: DISCONTINUED | OUTPATIENT
Start: 2025-07-17 | End: 2025-07-18 | Stop reason: HOSPADM

## 2025-07-17 RX ORDER — SODIUM CHLORIDE 0.9 % (FLUSH) 0.9 %
10 SYRINGE (ML) INJECTION AS NEEDED
Status: DISCONTINUED | OUTPATIENT
Start: 2025-07-17 | End: 2025-07-18 | Stop reason: HOSPADM

## 2025-07-17 RX ORDER — SODIUM CHLORIDE 0.9 % (FLUSH) 0.9 %
10 SYRINGE (ML) INJECTION EVERY 12 HOURS SCHEDULED
Status: DISCONTINUED | OUTPATIENT
Start: 2025-07-17 | End: 2025-07-18 | Stop reason: HOSPADM

## 2025-07-17 RX ORDER — POTASSIUM CITRATE 1080 MG/1
10 TABLET, EXTENDED RELEASE ORAL DAILY
Status: DISCONTINUED | OUTPATIENT
Start: 2025-07-17 | End: 2025-07-18 | Stop reason: HOSPADM

## 2025-07-17 RX ORDER — PANTOPRAZOLE SODIUM 40 MG/1
40 TABLET, DELAYED RELEASE ORAL
Status: DISCONTINUED | OUTPATIENT
Start: 2025-07-17 | End: 2025-07-18 | Stop reason: HOSPADM

## 2025-07-17 RX ORDER — DULOXETIN HYDROCHLORIDE 30 MG/1
60 CAPSULE, DELAYED RELEASE ORAL DAILY
Status: DISCONTINUED | OUTPATIENT
Start: 2025-07-17 | End: 2025-07-18 | Stop reason: HOSPADM

## 2025-07-17 RX ORDER — BISACODYL 5 MG/1
5 TABLET, DELAYED RELEASE ORAL DAILY PRN
Status: DISCONTINUED | OUTPATIENT
Start: 2025-07-17 | End: 2025-07-18 | Stop reason: HOSPADM

## 2025-07-17 RX ADMIN — PREGABALIN 100 MG: 75 CAPSULE ORAL at 13:32

## 2025-07-17 RX ADMIN — FERROUS SULFATE TAB EC 324 MG (65 MG FE EQUIVALENT) 324 MG: 324 (65 FE) TABLET DELAYED RESPONSE at 08:13

## 2025-07-17 RX ADMIN — HYDROCODONE BITARTRATE AND ACETAMINOPHEN 1 TABLET: 5; 325 TABLET ORAL at 20:15

## 2025-07-17 RX ADMIN — POTASSIUM CITRATE 10 MEQ: 10 TABLET, EXTENDED RELEASE ORAL at 08:13

## 2025-07-17 RX ADMIN — ALLOPURINOL 100 MG: 100 TABLET ORAL at 08:13

## 2025-07-17 RX ADMIN — MONTELUKAST SODIUM 10 MG: 10 TABLET, FILM COATED ORAL at 20:15

## 2025-07-17 RX ADMIN — HYDROCODONE BITARTRATE AND ACETAMINOPHEN 1 TABLET: 5; 325 TABLET ORAL at 01:15

## 2025-07-17 RX ADMIN — HEPARIN SODIUM 5000 UNITS: 5000 INJECTION INTRAVENOUS; SUBCUTANEOUS at 17:32

## 2025-07-17 RX ADMIN — PREGABALIN 100 MG: 75 CAPSULE ORAL at 08:26

## 2025-07-17 RX ADMIN — FLUTICASONE PROPIONATE 2 SPRAY: 50 SPRAY, METERED NASAL at 09:56

## 2025-07-17 RX ADMIN — PREGABALIN 100 MG: 75 CAPSULE ORAL at 01:15

## 2025-07-17 RX ADMIN — HEPARIN SODIUM 5000 UNITS: 5000 INJECTION INTRAVENOUS; SUBCUTANEOUS at 06:10

## 2025-07-17 RX ADMIN — HYDROCODONE BITARTRATE AND ACETAMINOPHEN 1 TABLET: 5; 325 TABLET ORAL at 07:31

## 2025-07-17 RX ADMIN — FUROSEMIDE 20 MG: 10 INJECTION, SOLUTION INTRAVENOUS at 08:13

## 2025-07-17 RX ADMIN — Medication 10 ML: at 01:15

## 2025-07-17 RX ADMIN — HYDROCODONE BITARTRATE AND ACETAMINOPHEN 1 TABLET: 5; 325 TABLET ORAL at 13:36

## 2025-07-17 RX ADMIN — DULOXETINE 60 MG: 30 CAPSULE, DELAYED RELEASE ORAL at 08:13

## 2025-07-17 RX ADMIN — TIOTROPIUM BROMIDE INHALATION SPRAY 2 PUFF: 3.12 SPRAY, METERED RESPIRATORY (INHALATION) at 07:21

## 2025-07-17 RX ADMIN — TIZANIDINE 4 MG: 4 TABLET ORAL at 01:15

## 2025-07-17 RX ADMIN — BUDESONIDE AND FORMOTEROL FUMARATE DIHYDRATE 2 PUFF: 160; 4.5 AEROSOL RESPIRATORY (INHALATION) at 19:35

## 2025-07-17 RX ADMIN — TIZANIDINE 4 MG: 4 TABLET ORAL at 21:28

## 2025-07-17 RX ADMIN — PREGABALIN 100 MG: 75 CAPSULE ORAL at 21:20

## 2025-07-17 RX ADMIN — Medication 10 ML: at 20:15

## 2025-07-17 RX ADMIN — Medication 10 ML: at 08:13

## 2025-07-17 RX ADMIN — VALSARTAN 160 MG: 80 TABLET ORAL at 08:13

## 2025-07-17 RX ADMIN — PREDNISONE 40 MG: 20 TABLET ORAL at 09:19

## 2025-07-17 RX ADMIN — LEVOTHYROXINE SODIUM 50 MCG: 50 TABLET ORAL at 08:13

## 2025-07-17 RX ADMIN — PANTOPRAZOLE SODIUM 40 MG: 40 TABLET, DELAYED RELEASE ORAL at 06:09

## 2025-07-17 RX ADMIN — BUDESONIDE AND FORMOTEROL FUMARATE DIHYDRATE 2 PUFF: 160; 4.5 AEROSOL RESPIRATORY (INHALATION) at 07:21

## 2025-07-17 RX ADMIN — FUROSEMIDE 20 MG: 10 INJECTION, SOLUTION INTRAVENOUS at 17:32

## 2025-07-17 RX ADMIN — ISOSORBIDE MONONITRATE 30 MG: 30 TABLET, EXTENDED RELEASE ORAL at 06:10

## 2025-07-17 NOTE — PROGRESS NOTES
[facility]    PROGRESS NOTE    Name:  Raquel Mariee   Age:  69 y.o.  Sex:  female  :  1955  MRN:  [id number]   Visit Number:  [id number]  Admission Date:  2025  Date Of Service:  25  Primary Care Physician:  Sanjeev Rawls MD     LOS: 0 days :    Chief Complaint:      dyspnea    Subjective:    2025: Admitting provider documentation reviewed    History Of Presenting Illness:       Patient is a 69-year-old history of aortic valve stenosis, diastolic CHF, CVID, diabetes, COPD, asthma, sleep apnea, who presented to the emergency room with complaints of shortness of breath.  Patient states progressive shortness of breath throughout the day, had tried breathing treatment at home with no improvement.  She has had decreased mobility, recently got out of the hospital in [place] due to spinal issues, underwent injection recently.  Is due to follow-up with them soon.  She has been on prednisone this week as well for arthritic flare.  She notes typically she does not take her Lasix at home, only as needed, this is after she receives a Gammagard infusion.  Denies any recent dietary changes.  Does not routinely monitor weight     In the ER she is overall hemodynamically stable.  Her labs were notable for a sodium of 121, baseline around 130.  proBNP was elevated.  Chest x-ray appeared to show bilateral pulmonary edema.  EKG with no ischemic changes.  She received diuretic therapy.  We were asked to admit for observation     Edited by: Siva Groves DO at 2025 0714     Review of Systems:     All systems were reviewed and negative except as mentioned in subjective, assessment and plan.    Vital Signs:    Temp:  [97.6 °F (36.4 °C)-98.2 °F (36.8 °C)] 97.8 °F (36.6 °C)  Heart Rate:  [] 79  Resp:  [18-22] 22  BP: (102-146)/(69-97) 129/97    Intake and output:    No intake/output data recorded.  No intake/output data recorded.    Physical  "Examination:    Constitutional: No acute distress, awake, alert  HENT: NCAT, mucous membranes moist  Respiratory: Basilar Rales bilaterally, respiratory effort normal, audible wheeze   Cardiovascular: RRR, 3 out of 6 murmur  Gastrointestinal: Positive bowel sounds, soft, nontender, nondistended  Musculoskeletal: 1+ bilateral ankle edema  Psychiatric: Appropriate affect, cooperative  Neurologic: Oriented x 3, speech clear  Skin: No rashes  Devices: Port in place  Edited by: Siva Groves, DO at 7/17/2025 0907     Laboratory results:    Results from last 7 days   Lab Units 07/16/25  2231   SODIUM mmol/L 121*   POTASSIUM mmol/L 4.5   CHLORIDE mmol/L 88*   CO2 mmol/L 21.4*   BUN mg/dL 15.0   CREATININE mg/dL 0.96   CALCIUM mg/dL 8.4*   BILIRUBIN mg/dL 0.4   ALK PHOS U/L 47   ALT (SGPT) U/L 25   AST (SGOT) U/L 36*   GLUCOSE mg/dL 134*     Results from last 7 days   Lab Units 07/16/25  2231   WBC 10*3/mm3 8.40   HEMOGLOBIN g/dL 10.2*   HEMATOCRIT % 29.4*   PLATELETS 10*3/mm3 218         Results from last 7 days   Lab Units 07/16/25  2336 07/16/25  2231   HSTROP T ng/L 39* 35*         No results for input(s): \"PHART\", \"JGB1RLO\", \"PO2ART\", \"AAQ8FSM\", \"BASEEXCESS\" in the last 8760 hours.   I have reviewed the patient's laboratory results.    Radiology results:    XR Chest 1 View  Result Date: 7/17/2025  PROCEDURE: XR CHEST 1 VW-  HISTORY: SOA Triage Protocol  COMPARISON: 5/31/2025.  FINDINGS: Apical lordotic positioning. There is mild elevation of the right hemidiaphragm. There is a patchy linear density in the right lung which is partially obscured by overlying EKG leads which appears similar to the prior. There is a left subclavian chest port terminating centrally. There is mild bilateral interstitial lung disease which is more pronounced within the lung bases similar to the prior. There are post thoracotomy changes of the right lung. There is no pneumothorax. There are no acute osseous abnormalities.  "     Impression: Stable chest.     Images were reviewed, interpreted, and dictated by Dr. Lisa Castellon MD Transcribed by Curtis Nance PA-C.  This report was signed and finalized on 7/17/2025 8:38 AM by Lisa Castellon MD.      I have reviewed the patient's radiology reports.    Medication Review:     I have reviewed the patient's active and prn medications.     Problem List:      Hyponatremia      Assessment/Plan:    This patient's problems and plans were partially entered by my partner and updated as appropriate by me 07/17/25.    Acute on chronic diastolic CHF, POA  Acute on chronic hyponatremia, POA  History of asthma  Hypertension  Spinal stenosis     Plan:     Admit for observation     Acute on chronic diastolic CHF  Aortic stenosis  Aortic regurgitation    -Suspect volume overload due to valvular heart disease and CHF in combination with steroid use for arthritis. Will hold off on the echo for right now since just had a few months ago.  -IV Lasix, GDMT as tolerated    Asthma exacerbation:  - resume steroids, continue bronchodilators and nebs       Hyponatremia:  Sodium 121 at presentation, baseline about 130 suspect hypervolemic hyponatremia due to CHF  -Diuresis  -Serial basic metabolic panel, urine studies     CVID  - Receives Gammagard outpatient infusions port in place    DVT Prophylaxis: Heparin  Code Status: Full  Diet: Fluid restriction  Disposition: Will discharge once breathing and sodium at baseline  Edited by: Siva Groves DO at 7/17/2025 0907           Siva Groves DO  07/17/25  09:07 EDT    Dictated utilizing Dragon dictation.

## 2025-07-17 NOTE — CASE MANAGEMENT/SOCIAL WORK
Discharge Planning Assessment   [facility]     Patient Name: Raquel Mariee  MRN: [id number]  Today's Date: 7/17/2025    Admit Date: 7/16/2025        Discharge Needs Assessment       Row Name 07/17/25 1211       Living Environment    People in Home spouse    Name(s) of People in Home Bashir Mariee    Current Living Arrangements home    Duration at Residence 5 yrs    Potentially Unsafe Housing Conditions none    In the past 12 months has the electric, gas, oil, or water company threatened to shut off services in your home? No    Primary Care Provided by self    Family Caregiver if Needed spouse    Quality of Family Relationships involved;helpful    Able to Return to Prior Arrangements yes       Resource/Environmental Concerns    Transportation Concerns none       Transportation Needs    In the past 12 months, has lack of transportation kept you from medical appointments or from getting medications? no    In the past 12 months, has lack of transportation kept you from meetings, work, or from getting things needed for daily living? No       Food Insecurity    Within the past 12 months, you worried that your food would run out before you got the money to buy more. Never true    Within the past 12 months, the food you bought just didn't last and you didn't have money to get more. Never true       Transition Planning    Patient/Family Anticipates Transition to home with family       Discharge Needs Assessment    Readmission Within the Last 30 Days current reason for admission unrelated to previous admission    Equipment Currently Used at Home --  has bsc and  chair, not currently using    Concerns to be Addressed denies needs/concerns at this time    Do you want help finding or keeping work or a job? I do not need or want help    Do you want help with school or training? For example, starting or completing job training or getting a high school diploma, GED or equivalent No    Anticipated Changes Related to  Illness none    Equipment Needed After Discharge none    Discharge Facility/Level of Care Needs other (see comments)  outpt PT at [facility]                   Discharge Plan       Row Name 07/17/25 1213       Plan    Plan Comments Pt provided demographics and dcp information. She has AD on file, her spouse is POA. No financial hardship. She has a cane and walker, has but not currently using a bsc and shower chair. Dr. Rawls is her primary, she use [facility]. She and her spouse have been at current residence for 5 yrs. She would like to continue outpt PT with [facility] and plans to return home at [place].                  Continued Care and Services - Admitted Since 7/16/2025    No active coordination exists.       Selected Continued Care - Episodes Includes continued care and service providers with selected services from the active episodes listed below      Asthma - External Fill Episode start date: 3/11/2024   There are no active outsourced providers for this episode.                 Selected Continued Care - Prior Encounters Includes continued care and service providers with selected services from prior encounters from 4/17/2025 to 7/17/2025      Discharged on 7/11/2025 Admission date: 7/4/2025 - Discharge disposition: Home or Self Care      Destination       Service Provider Services Address Phone Fax Patient Preferred    [facility] [address] [address] [phone] --              Therapy       Service Provider Services Address Phone Fax Patient Preferred    [facility] PHYSICAL THERAPY [facility] [address] [facility] [address] [phone] --                             Demographic Summary       Row Name 07/17/25 1206       General Information    Admission Type inpatient    Arrived From emergency department    Required Notices Provided Important Message  from Medicare    Referral Source admission list    Reason for Consult discharge planning    Preferred Language English       Contact Information    Permission Granted to Share Info With family/designee                   Functional Status       Row Name 07/17/25 1208       Functional Status    Usual Activity Tolerance moderate    Current Activity Tolerance moderate       Physical Activity    On average, how many days per week do you engage in moderate to strenuous exercise (like a brisk walk)? 0 days    On average, how many minutes do you engage in exercise at this level? 0 min    Number of minutes of exercise per week 0       Functional Status, IADL    Medications independent    Meal Preparation independent    Housekeeping independent    Laundry independent    Shopping independent    If for any reason you need help with day-to-day activities such as bathing, preparing meals, shopping, managing finances, etc., do you get the help you need? I don't need any help    IADL Comments spouse mostly transports, pt drives some       Mental Status    General Appearance WDL WDL       Mental Status Summary    Recent Changes in Mental Status/Cognitive Functioning no changes       Employment/    Employment Status retired                   Psychosocial    No documentation.                  Abuse/Neglect    No documentation.                  Legal    No documentation.                  Substance Abuse    No documentation.                  Patient Forms    No documentation.                     Melissa Zayas RN

## 2025-07-17 NOTE — PLAN OF CARE
Goal Outcome Evaluation:              Outcome Evaluation: Pt ambulated in hallway with walker and tolerated very well. Pt complaint of unresolved back pain was managed with timely pain med administration.  Labs/abx given via port. Pt states she is feeling much better and hopes to go home soon. No acute events throughout shift, POC continued.

## 2025-07-17 NOTE — PLAN OF CARE
Goal Outcome Evaluation:      VSS during shift. No s/s of acute distress noted at this time. POC ongoing.  Problem: Adult Inpatient Plan of Care  Goal: Absence of Hospital-Acquired Illness or Injury  Intervention: Identify and Manage Fall Risk  Recent Flowsheet Documentation  Taken 7/17/2025 0400 by Patricia Arellano, RN  Safety Promotion/Fall Prevention:   safety round/check completed   nonskid shoes/slippers when out of bed   fall prevention program maintained   assistive device/personal items within reach   clutter free environment maintained  Taken 7/17/2025 0200 by Patricia Arellano, RN  Safety Promotion/Fall Prevention:   safety round/check completed   nonskid shoes/slippers when out of bed   fall prevention program maintained   assistive device/personal items within reach   clutter free environment maintained  Taken 7/17/2025 0048 by Patricia Arellano, RN  Safety Promotion/Fall Prevention:   safety round/check completed   nonskid shoes/slippers when out of bed   fall prevention program maintained   assistive device/personal items within reach   clutter free environment maintained

## 2025-07-17 NOTE — THERAPY DISCHARGE NOTE
PT order was received.  Patient states she does not need PT at this time.  Patient has had back and leg problems since February and she is being treated at [facility].  She had an epidural on 7/15/2025, with some pain relief.  She states she has been going to PT at [facility] outpatient working with Garry Miguel [facility].  She demonstrated a good understanding of the importance of mobility and how to perform her exercises and stretches given to her by Garry.  She states she does not need PT in the acute care setting, as she has been up independently in her room.  She is going to walk with nursing staff in the hallway.    She needs to follow up with outpatient PT and would like us to set up an appointment for her the last week in July.  PT will sign off at this time.

## 2025-07-17 NOTE — ED PROVIDER NOTES
[facility] EMERGENCY DEPARTMENT  Emergency Department Encounter  Emergency Medicine Physician Note       Pt Name: Raquel Mariee  MRN: [id number]  Pt :   1955  Room Number:  306/1  Date of encounter:  2025  PCP: Sanjeev Rawls MD  ED Provider: Darryn Schaffer MD    Historian: Patient      HPI:  Chief Complaint: Shortness of breath        Context: Raquel Mariee is a 69 y.o. female who presents to the ED c/o shortness of breath.  Patient states since this morning she has had issues with shortness of breath.  Has tried using her nebulizer machine at home a few times but still feels short of breath.  Has not taken any Lasix, as she has been told in the past only to take it after her infusions.  Patient does report leg swelling bilaterally.  Denies any fevers.  No other symptoms reported this time.  Patient is currently on 20 mg prednisone daily for the past 3 days for arthritis flare.      PAST MEDICAL HISTORY  Past Medical History:   Diagnosis Date    Anxiety     Aortic valve stenosis     Cardiac murmur     Cataract, bilateral     CHF (congestive heart failure)     Cholelithiasis GB out    Chronic kidney disease     Stage III    Clostridium difficile infection     in her 30s    Colon polyp Ever since having colonoscopies.    Common variable immunodeficiency     IVIG infusions    Compression fracture of lumbar spine, non-traumatic     COPD (chronic obstructive pulmonary disease)     Coronary artery disease     Prinz metal angina & aortic stenosis    Depression     Diabetes mellitus     type II    Eosinophilic asthma     Fibromyalgia, primary     Full dentures     GERD (gastroesophageal reflux disease)     History of transfusion     in  at [facility].  No reaction noted, patient states she does have an antibody from transfusion.    Hypertension     Hypogammaglobulinemia     Hypothyroidism     Irritable bowel syndrome     Migraines     Morbid obesity     Optic  neuritis     Optic neuropathy     Osteoarthritis     Prinzmetal angina 1990    Pseudomonas infection 1998    lungs    PTSD (post-traumatic stress disorder)     Pulmonary edema     Renal insufficiency     Sinus tachycardia 1990    Sleep apnea     no longer uses/needs cpap    Stroke     TIA about 7 years ago    Tachycardia     TIA (transient ischemic attack) 2017    Trochanteric bursitis 01/25/2023         PAST SURGICAL HISTORY  Past Surgical History:   Procedure Laterality Date    APPENDECTOMY      BUNIONECTOMY Bilateral     CARDIAC CATHETERIZATION  2017    no stents needed    CARPAL TUNNEL RELEASE Right     COLONOSCOPY  2021    ENDOMETRIAL ABLATION  1997    EYE SURGERY  ? About 6-8 years ago    Laser for glaucoma    FRACTURE SURGERY  1995    No hardware; Tibeal plateau    GASTRIC BYPASS      HAND SURGERY Left     No hardware    HEMORRHOIDECTOMY N/A 04/09/2024    Procedure: HEMORRHOID BANDING X2;  Surgeon: Melissa Beck MD;  Location: [facility] OR;  Service: General;  Laterality: N/A;    INTERSTIM PLACEMENT N/A 05/03/2023    Procedure: INTERSTIM STAGES 1 AND 2 LEAD AND GENERATOR PLACEMENT;  Surgeon: Abner Nation MD;  Location: [facility] OR;  Service: Urology;  Laterality: N/A;    POSTERIOR VAGINAL REPAIR N/A 08/02/2023    Procedure: POSTERIOR COLPORRHAPHY;  Surgeon: Kellen Galan MD;  Location:  [facility];  Service: Gynecology;  Laterality: N/A;    RECTAL EXAMINATION UNDER ANESTHESIA N/A 04/09/2024    Procedure: RECTAL EXAM UNDER ANESTHESIA;  Surgeon: Melissa Beck MD;  Location: [facility];  Service: General;  Laterality: N/A;    REPLACEMENT TOTAL KNEE BILATERAL      TEETH EXTRACTION      all of bottom teeth removed    THORACOTOMY      TONSILLECTOMY      TOTAL ABDOMINAL HYSTERECTOMY WITH SALPINGO OOPHORECTOMY      TRACHEAL SURGERY      Repaired via thoracotomy    TUBAL ABDOMINAL LIGATION  1983    VENOUS ACCESS DEVICE (PORT) INSERTION      port a cath in the left chest wall         FAMILY  HISTORY  Family History   Problem Relation Age of Onset    Diabetes Mother     Stroke Mother     Heart disease Mother     Hypertension Mother     Endometriosis Mother     Depression Mother     Diabetes Father     Hypertension Father     Stroke Father     Heart attack Father     Asthma Father     COPD Father     Dementia Father     Heart disease Father     COPD Sister     Asthma Sister     Cancer Sister         Nasal cell skin    Brain cancer Sister     Diabetes Sister     Stroke Sister     Heart attack Sister     Endometriosis Sister     Depression Sister     Arthritis Sister         Rheumatoid    Hypertension Sister     Kidney disease Sister     Diabetes Sister     COPD Sister     Asthma Sister     Endometriosis Sister     Depression Sister     Cancer Sister         Glioma    Heart disease Sister         MI    Heart attack Sister     Endometriosis Sister     Asthma Sister     Hypertension Sister     Kidney disease Sister         ESRD    COPD Brother     Asthma Brother         Turned into COPD    Diabetes Brother     Asthma Brother     COPD Brother     Diabetes Brother     Hypertension Brother     Asthma Daughter     Endometriosis Daughter     Osteoarthritis Daughter     Hypertension Daughter     Kidney failure Daughter     Irritable bowel syndrome Daughter     Anxiety disorder Daughter     Bipolar disorder Daughter     Depression Daughter     Self-Injurious Behavior  Daughter     Suicide Attempts Daughter     Mental illness Daughter         Bipolar and eating fisorder    Asthma Daughter     Endometriosis Daughter     Osteoarthritis Daughter     Asthma Son     ADD / ADHD Son     Alcohol abuse Son     Drug abuse Son     Breast cancer Maternal Cousin     Heart disease Maternal Grandfather     Heart disease Maternal Uncle     OCD Neg Hx     Paranoid behavior Neg Hx     Schizophrenia Neg Hx     Seizures Neg Hx     Ovarian cancer Neg Hx          SOCIAL HISTORY  Social History     Socioeconomic History    Marital status:     Tobacco Use    Smoking status: Never     Passive exposure: Never    Smokeless tobacco: Never   Vaping Use    Vaping status: Never Used   Substance and Sexual Activity    Alcohol use: Yes     Comment: ONCE A YEAR    Drug use: Never    Sexual activity: Defer         ALLERGIES  Cefaclor, Cephalexin, Cephalosporins, Escitalopram oxalate, Ambien [zolpidem tartrate], Cardizem [diltiazem hcl], Metoclopramide, Mobic [meloxicam], Penicillins, Sumatriptan, Topamax [topiramate], Verapamil, Zolpidem, Amoxicillin, Edecrin [ethacrynic acid], Hydrochlorothiazide, and Sulfa antibiotics        REVIEW OF SYSTEMS  Review of Systems     All systems reviewed and negative except for those discussed in HPI.       PHYSICAL EXAM    I have reviewed the triage vital signs and nursing notes.    ED Triage Vitals [07/16/25 2150]   Temp Heart Rate Resp BP SpO2   97.6 °F (36.4 °C) 97 22 143/86 96 %      Temp src Heart Rate Source Patient Position BP Location FiO2 (%)   Oral Monitor Sitting Left arm --       Physical Exam    General:  Awake, alert, elderly, overweight, no acute distress  HEENT: Atraumatic, normocephalic, EOMI, PERRLA, mucous membranes moist  NECK:  Supple, atraumatic  Cardiovascular:  Regular rate, regular rhythm, no murmurs, rubs, or gallops.  Extremities well perfused   Respiratory: Expiratory wheeze with prolonged expiratory phase of breathing.  Mild rhonchi throughout lower lung fields bilaterally.  No stridor.  Abdominal:  Soft, nondistended, nontender.  No guarding or rebound.  No palpable masses  Extremity:  No visible bony abnormalities in all 4 extremities.  Full range of motion of all extremities.  Skin:  Warm and dry.  No rashes  Neuro:  AAOx3, GCS 15. Cranial nerves 2-12 grossly intact.  No focal strength or sensation deficits.  Psych:  Mood and affect appropriate.        LAB RESULTS  Recent Results (from the past 24 hours)   Comprehensive Metabolic Panel    Collection Time: 07/16/25 10:31 PM    Specimen:  Blood   Result Value Ref Range    Glucose 134 (H) 65 - 99 mg/dL    BUN 15.0 8.0 - 23.0 mg/dL    Creatinine 0.96 0.57 - 1.00 mg/dL    Sodium 121 (L) 136 - 145 mmol/L    Potassium 4.5 3.5 - 5.2 mmol/L    Chloride 88 (L) 98 - 107 mmol/L    CO2 21.4 (L) 22.0 - 29.0 mmol/L    Calcium 8.4 (L) 8.6 - 10.5 mg/dL    Total Protein 6.3 6.0 - 8.5 g/dL    Albumin 3.7 3.5 - 5.2 g/dL    ALT (SGPT) 25 1 - 33 U/L    AST (SGOT) 36 (H) 1 - 32 U/L    Alkaline Phosphatase 47 39 - 117 U/L    Total Bilirubin 0.4 0.0 - 1.2 mg/dL    Globulin 2.6 gm/dL    A/G Ratio 1.4 g/dL    BUN/Creatinine Ratio 15.6 7.0 - 25.0    Anion Gap 11.6 5.0 - 15.0 mmol/L    eGFR 64.2 >60.0 mL/min/1.73   BNP    Collection Time: 07/16/25 10:31 PM    Specimen: Blood   Result Value Ref Range    proBNP 6,154.0 (H) 0.0 - 900.0 pg/mL   High Sensitivity Troponin T    Collection Time: 07/16/25 10:31 PM    Specimen: Blood   Result Value Ref Range    HS Troponin T 35 (H) <14 ng/L   Green Top (Gel)    Collection Time: 07/16/25 10:31 PM   Result Value Ref Range    Extra Tube Hold for add-ons.    Lavender Top    Collection Time: 07/16/25 10:31 PM   Result Value Ref Range    Extra Tube hold for add-on    Gold Top - SST    Collection Time: 07/16/25 10:31 PM   Result Value Ref Range    Extra Tube Hold for add-ons.    Light Blue Top    Collection Time: 07/16/25 10:31 PM   Result Value Ref Range    Extra Tube Hold for add-ons.    CBC Auto Differential    Collection Time: 07/16/25 10:31 PM    Specimen: Blood   Result Value Ref Range    WBC 8.40 3.40 - 10.80 10*3/mm3    RBC 3.16 (L) 3.77 - 5.28 10*6/mm3    Hemoglobin 10.2 (L) 12.0 - 15.9 g/dL    Hematocrit 29.4 (L) 34.0 - 46.6 %    MCV 93.0 79.0 - 97.0 fL    MCH 32.3 26.6 - 33.0 pg    MCHC 34.7 31.5 - 35.7 g/dL    RDW 12.9 12.3 - 15.4 %    RDW-SD 43.9 37.0 - 54.0 fl    MPV 9.4 6.0 - 12.0 fL    Platelets 218 140 - 450 10*3/mm3    Neutrophil % 77.8 (H) 42.7 - 76.0 %    Lymphocyte % 12.9 (L) 19.6 - 45.3 %    Monocyte % 8.2 5.0 - 12.0 %     Eosinophil % 0.1 (L) 0.3 - 6.2 %    Basophil % 0.2 0.0 - 1.5 %    Immature Grans % 0.8 (H) 0.0 - 0.5 %    Neutrophils, Absolute 6.53 1.70 - 7.00 10*3/mm3    Lymphocytes, Absolute 1.08 0.70 - 3.10 10*3/mm3    Monocytes, Absolute 0.69 0.10 - 0.90 10*3/mm3    Eosinophils, Absolute 0.01 0.00 - 0.40 10*3/mm3    Basophils, Absolute 0.02 0.00 - 0.20 10*3/mm3    Immature Grans, Absolute 0.07 (H) 0.00 - 0.05 10*3/mm3    nRBC 0.0 0.0 - 0.2 /100 WBC   High Sensitivity Troponin T 1Hr    Collection Time: 07/16/25 11:36 PM    Specimen: Blood   Result Value Ref Range    HS Troponin T 39 (H) <14 ng/L    Troponin T Numeric Delta 4 ng/L    Troponin T % Delta 11 Abnormal if >/= 20%               PROCEDURES    Procedures    ECG 12 Lead Dyspnea   Final Result          MEDICATIONS GIVEN IN ER    Medications   sodium chloride 0.9 % flush 10 mL (has no administration in time range)   allopurinol (ZYLOPRIM) tablet 100 mg (has no administration in time range)   HYDROcodone-acetaminophen (NORCO) 5-325 MG per tablet 1 tablet (has no administration in time range)   budesonide-formoterol (SYMBICORT) 160-4.5 MCG/ACT inhaler 2 puff (has no administration in time range)     And   tiotropium (SPIRIVA RESPIMAT) 2.5 mcg/act aerosol solution inhaler (has no administration in time range)   DULoxetine (CYMBALTA) DR capsule 60 mg (has no administration in time range)   ferrous sulfate EC tablet 324 mg (has no administration in time range)   ipratropium-albuterol (DUO-NEB) nebulizer solution 3 mL (has no administration in time range)   isosorbide mononitrate (IMDUR) 24 hr tablet 30 mg (has no administration in time range)   levothyroxine (SYNTHROID, LEVOTHROID) tablet 50 mcg (has no administration in time range)   montelukast (SINGULAIR) tablet 10 mg (has no administration in time range)   pantoprazole (PROTONIX) EC tablet 40 mg (has no administration in time range)   ondansetron ODT (ZOFRAN-ODT) disintegrating tablet 4 mg (has no administration in time  range)   potassium citrate (UROCIT-K) CR tablet 10 mEq (has no administration in time range)   pregabalin (LYRICA) capsule 100 mg (has no administration in time range)   tiZANidine (ZANAFLEX) tablet 4 mg (has no administration in time range)   valsartan (DIOVAN) tablet 160 mg (has no administration in time range)   sodium chloride 0.9 % flush 10 mL (has no administration in time range)   sodium chloride 0.9 % flush 10 mL (has no administration in time range)   sodium chloride 0.9 % infusion 40 mL (has no administration in time range)   nitroglycerin (NITROSTAT) SL tablet 0.4 mg (has no administration in time range)   acetaminophen (TYLENOL) tablet 650 mg (has no administration in time range)     Or   acetaminophen (TYLENOL) 160 MG/5ML oral solution 650 mg (has no administration in time range)     Or   acetaminophen (TYLENOL) suppository 650 mg (has no administration in time range)   sennosides-docusate (PERICOLACE) 8.6-50 MG per tablet 2 tablet (has no administration in time range)     And   polyethylene glycol (MIRALAX) packet 17 g (has no administration in time range)     And   bisacodyl (DULCOLAX) EC tablet 5 mg (has no administration in time range)     And   bisacodyl (DULCOLAX) suppository 10 mg (has no administration in time range)   furosemide (LASIX) injection 20 mg (has no administration in time range)   ipratropium-albuterol (DUO-NEB) nebulizer solution 3 mL (3 mL Nebulization Given 7/16/25 2219)   furosemide (LASIX) injection 80 mg (80 mg Intravenous Given 7/16/25 2304)         MEDICAL DECISION MAKING, PROGRESS, and CONSULTS    All labs, if obtained, have been independently reviewed by me.  All radiology studies, if obtained, have been evaluated by me and the radiologist dictating the report.  All EKG's, if obtained, have been independently viewed and interpreted by me.      Discussion below represents my analysis of pertinent findings related to patient's condition, differential diagnosis, treatment plan  and final disposition.    Raquel Mariee is a 69 y.o. female who presents to the ED c/o shortness of breath.  Hemodynamically stable on arrival.  Oxygen saturation of 96% on room air but patient does have audible wheezing on exam.  Differential includes was not limited to COPD flare, CHF exacerbation, pneumothorax, arrhythmia, myocardial infarction.  Patient given DuoNeb treatment.    EKG obtained which was personally visualized showing mostly sinus rhythm with rate of 99, however did show frequent PVCs.    Chest x-ray obtained which was personally visualized showing evidence of bilateral pulmonary edema, worse in the right lung compared to left.    Labs showed no significant leukocytosis.  Troponin elevation with initial troponin of 35 and repeat troponin of 39.  Significant elevated proBNP of 6154.  Labs also show significant hyponatremia of 121 with low serum chloride of 88 but normal creatinine and BUN.  Hyponatremia likely secondary to fluid overload.  Patient given 80 mg IV Lasix.    discussed patient's case with hospitalist who admitted patient for further treatment.                             Orders placed during this visit:  Orders Placed This Encounter   Procedures    XR Chest 1 View    [place] Draw    Comprehensive Metabolic Panel    BNP    High Sensitivity Troponin T    CBC Auto Differential    High Sensitivity Troponin T 1Hr    Basic Metabolic Panel    Diet: Cardiac, Fluid Restriction (240 mL/tray); Healthy Heart (2-3 Na+); 1500 mL/day; Fluid Consistency: Thin (IDDSI 0)    Undress & Gown    Continuous Pulse Oximetry    Vital Signs    Vital Signs    Intake & Output    Weigh Patient    Oral Care    Place Sequential Compression Device    Maintain Sequential Compression Device    Maintain IV Access    Telemetry - Place Orders & Notify Provider of Results When Patient Experiences Acute Chest Pain, Dysrhythmia or Respiratory Distress    May Be Off Telemetry for Tests    Code Status and Medical  Interventions: CPR (Attempt to Resuscitate); Full Support    Oxygen Therapy- Nasal Cannula; Titrate 1-6 LPM Per SpO2; 90 - 95%    ECG 12 Lead Dyspnea    Insert Peripheral IV    Initiate Observation Status    CBC & Differential    Green Top (Gel)    Lavender Top    Gold Top - SST    Light Blue Top         ED Course:                Shared Decision Making:  After my consideration of clinical presentation and any laboratory/radiology studies obtained, I discussed the findings with the patient/patient representative who is in agreement with the treatment plan and the final disposition.   Risks and benefits of discharge and/or observation/admission were discussed.      AS OF 00:55 EDT VITALS:    BP - 103/72  HR - 98  TEMP - 97.6 °F (36.4 °C) (Oral)  O2 SATS - 97%                  DIAGNOSIS  Final diagnoses:   Acute pulmonary edema   Orthopnea   Hyponatremia         DISPOSITION  Admit      Please note that portions of this document were completed with voice recognition software.        Darryn Schaffer MD  07/17/25 0055

## 2025-07-17 NOTE — H&P
[facility]      Name:  Raquel Mariee   Age:  69 y.o.  Sex:  female  :  1955  MRN:  [id number]   Visit Number:  [id number]  Admission Date:  2025  Date Of Service:  25  Primary Care Physician:  Sanjeev Rawls MD    Chief Complaint:     Shortness of breath    History Of Presenting Illness:      Patient is a 69-year-old history of aortic valve stenosis, diastolic CHF, CVID, diabetes, COPD, asthma, sleep apnea, who presented to the emergency room with complaints of shortness of breath.  Patient states progressive shortness of breath throughout the day, had tried breathing treatment at home with no improvement.  She has had decreased mobility, recently got out of the hospital in [place] due to spinal issues, underwent injection recently.  Is due to follow-up with them soon.  She has been on prednisone this week as well for arthritic flare.  She notes typically she does not take her Lasix at home, only as needed, this is after she receives a Gammagard infusion.  Denies any recent dietary changes.  Does not routinely monitor weight    In the ER she is overall hemodynamically stable.  Her labs were notable for a sodium of 121, baseline around 130.  proBNP was elevated.  Chest x-ray appeared to show bilateral pulmonary edema.  EKG with no ischemic changes.  She received diuretic therapy.  We were asked to admit for observation    Review Of Systems:    All systems were reviewed and negative except as mentioned in history of presenting illness, assessment and plan.    Past Medical History: Patient  has a past medical history of Anxiety, Aortic valve stenosis, Cardiac murmur, Cataract, bilateral, CHF (congestive heart failure), Cholelithiasis (GB out), Chronic kidney disease, Clostridium difficile infection, Colon polyp (Ever since having colonoscopies.), Common variable immunodeficiency, Compression fracture of lumbar spine, non-traumatic, COPD  (chronic obstructive pulmonary disease), Coronary artery disease, Depression, Diabetes mellitus, Eosinophilic asthma, Fibromyalgia, primary, Full dentures, GERD (gastroesophageal reflux disease), History of transfusion, Hypertension, Hypogammaglobulinemia, Hypothyroidism, Irritable bowel syndrome, Migraines, Morbid obesity, Optic neuritis, Optic neuropathy, Osteoarthritis, Prinzmetal angina (1990), Pseudomonas infection (1998), PTSD (post-traumatic stress disorder), Pulmonary edema, Renal insufficiency, Sinus tachycardia (1990), Sleep apnea, Stroke, Tachycardia, TIA (transient ischemic attack) (2017), and Trochanteric bursitis (01/25/2023).    Past Surgical History: Patient  has a past surgical history that includes Appendectomy; Tonsillectomy; Replacement total knee bilateral; Total abdominal hysterectomy w/ bilateral salpingoophorectomy; Gastric bypass; Bunionectomy (Bilateral); Hand surgery (Left); Teeth Extraction; Cardiac catheterization (2017); Carpal tunnel release (Right); Interstim Stages 1 and 2 Lead and Generator Placement (N/A, 05/03/2023); Endometrial ablation (1997); Eye surgery (? About 6-8 years ago); Fracture surgery (1995); Tubal ligation (1983); Colonoscopy (2021); Thoracotomy; Tracheal surgery; Posterior Vaginal Repair (N/A, 08/02/2023); Venous Access Device (Port); Rectal examination under anesthesia (N/A, 04/09/2024); and Hemorrhoid surgery (N/A, 04/09/2024).    Social History: Patient  reports that she has never smoked. She has never been exposed to tobacco smoke. She has never used smokeless tobacco. She reports current alcohol use. She reports that she does not use drugs.    Family History:  Patient's family history has been reviewed and found to be noncontributory.  Appears to be in sinus rhythm with a normal rate.  No    Allergies:      Cefaclor, Cephalexin, Cephalosporins, Escitalopram oxalate, Ambien [zolpidem tartrate], Cardizem [diltiazem hcl], Metoclopramide, Mobic [meloxicam],  Penicillins, Sumatriptan, Topamax [topiramate], Verapamil, Zolpidem, Amoxicillin, Edecrin [ethacrynic acid], Hydrochlorothiazide, and Sulfa antibiotics    Home Medications:    Prior to Admission Medications       Prescriptions Last Dose Informant Patient Reported? Taking?    acetaminophen-codeine (TYLENOL with CODEINE #3) 300-30 MG per tablet   No No    Take 1 tablet by mouth Every 6 (Six) Hours.    albuterol sulfate HFA (Ventolin HFA) 108 (90 Base) MCG/ACT inhaler   No No    Inhale 2 puffs Every 4 (Four) to 6 (Six) Hours As Needed for cough, wheeze, and shortness of breath    allopurinol (ZYLOPRIM) 100 MG tablet   No No    Take 1 tablet by mouth Daily.    betamethasone valerate (VALISONE) 0.1 % cream  Self No No    Apply topically to the appropriate area as directed Daily.    Budeson-Glycopyrrol-Formoterol (BREZTRI) 160-9-4.8 MCG/ACT aerosol inhaler   No No    Inhale 2 puffs by mouth 2 (Two) Times a Day. Rinse out mouth after use    calcium carbonate (Antacid Maximum) 1000 MG chewable tablet  Self Yes No    Chew 500 mg 3 (Three) Times a Day.    cholecalciferol (VITAMIN D3) 25 MCG (1000 UT) tablet  Self Yes No    Take 1 tablet by mouth Daily.    denosumab (PROLIA) 60 MG/ML solution prefilled syringe syringe   Yes No    Inject 1 mL under the skin into the appropriate area as directed Every 6 (Six) Months.    DULoxetine (Cymbalta) 60 MG capsule   No No    Take 1 capsule by mouth Daily.    estradiol (ESTRACE) 0.1 MG/GM vaginal cream  Self No No    Insert 0.5 grams vaginally twice weekly    ferrous sulfate 325 (65 FE) MG EC tablet  Self Yes No    Take 2 tablets by mouth Daily With Breakfast.    fexofenadine (ALLEGRA) 180 MG tablet  Self Yes No    Take 1 tablet by mouth Daily.    Fluticasone Propionate (Xhance) 93 MCG/ACT Exhaler Suspension   No No    Administer 1 spray into the nostril(s) as directed by provider 2 (Two) Times a Day.    furosemide (Lasix) 40 MG tablet   No No    Take 1 tablet by mouth Daily As Needed  (soa).    glucosamine-chondroitin 500-400 MG capsule capsule  Self Yes No    Take 1 capsule by mouth 2 (Two) Times a Day With Meals.    guaiFENesin-codeine (ROMILAR-AC) 100-10 MG/5ML solution/syrup   No No    Take 5 mL by mouth 3 (Three) Times a Day As Needed for Cough or Congestion.    HYDROcodone-acetaminophen (NORCO) 5-325 MG per tablet   No No    Take 1 tablet by mouth Every 6 (Six) Hours As Needed for Moderate Pain.    Hydrocortisone, Perianal, (Anusol-HC) 2.5 % rectal cream   No No    Apply rectally 2 times daily    Hypertonic Nasal Wash (Sinus Rinse Kit) pack  Self No No    use once or twice daily as needed    immune globulin, human, (Gammagard) 30 GM/300ML solution infusion   No No    50 grams IV every 4 weeks    ipratropium (ATROVENT) 0.06 % nasal spray   No No    Instill 1-2 sprays each nostril 2-3 times daily as needed.    ipratropium-albuterol (DUO-NEB) 0.5-2.5 mg/3 ml nebulizer   No No    Inhale the contents of 1 vial through a nebulizer every 4-6 hours as needed for cough,wheezing and shortness of breath.    isosorbide mononitrate (IMDUR) 30 MG 24 hr tablet   No No    Take 1 tablet by mouth Every Morning.    levothyroxine (SYNTHROID, LEVOTHROID) 50 MCG tablet   No No    Take 1 tablet by mouth Daily.    linaclotide (Linzess) 145 MCG capsule capsule   No No    Take 1 capsule by mouth Every Morning Before Breakfast.    Magnesium 250 MG tablet  Self Yes No    Take 2 tablets by mouth 2 (Two) Times a Day.    Mepolizumab (Nucala) 100 MG/ML solution auto-injector  Self Yes No    Inject 1 mL under the skin into the appropriate area as directed Every 28 (Twenty-Eight) Days. [facility] [place] [facility]    Misc. Devices ([place]) misc   No No    Use 1 each As Needed (for hemorrhoids).    montelukast (SINGULAIR) 10 MG tablet   No No    Take 1 tablet by mouth every night at bedtime.    multivitamin with minerals tablet tablet  Self Yes No    Take 1 tablet by mouth Daily.    omeprazole  "(priLOSEC) 40 MG capsule   No No    Take 1 capsule by mouth 2 (Two) Times a Day.    ondansetron (ZOFRAN) 4 MG tablet   No No    Take 1 tablet by mouth Every 8 (Eight) Hours As Needed for Nausea or Vomiting.    potassium citrate (UROCIT-K) 10 MEQ (1080 MG) CR tablet   No No    Take 1 tablet by mouth Daily.    predniSONE (DELTASONE) 20 MG tablet   No No    Take 2 tablets prior to Gammgard infusion    predniSONE (DELTASONE) 5 MG tablet   No No    take 4 tabs x 3 days, 3 tabs x 3 days, 2 tabs x 3 days, 1 tab x 3 days then off.    pregabalin (LYRICA) 100 MG capsule   No No    Take 1 capsule by mouth Every 8 (Eight) Hours for 30 days.    solifenacin (VESIcare) 5 MG tablet   No No    Take 1 tablet by mouth Daily.    tiZANidine (ZANAFLEX) 4 MG tablet   No No    Take 1 tablet by mouth 2 (Two) Times a Day As Needed for Muscle Spasms.    ubrogepant (Ubrelvy) 100 MG tablet   No No    Take 1 tablet by mouth 1 (One) Time As Needed (migraine).    valsartan (DIOVAN) 160 MG tablet   No No    Take 1 tablet by mouth Daily.    vitamin B-12 (CYANOCOBALAMIN) 1000 MCG tablet  Self Yes No    Take 1 tablet by mouth Daily.          ED Medications:    Medications   sodium chloride 0.9 % flush 10 mL (has no administration in time range)   ipratropium-albuterol (DUO-NEB) nebulizer solution 3 mL (3 mL Nebulization Given 7/16/25 2219)   furosemide (LASIX) injection 80 mg (80 mg Intravenous Given 7/16/25 2304)     Vital Signs:  Temp:  [97.6 °F (36.4 °C)] 97.6 °F (36.4 °C)  Heart Rate:  [] 98  Resp:  [22] 22  BP: (102-146)/(69-89) 146/89        07/16/25 2150   Weight: 94.3 kg (208 lb)     Body mass index is 34.61 kg/m².    Physical Exam:     Most recent vital Signs: /89   Pulse 98   Temp 97.6 °F (36.4 °C) (Oral)   Resp 22   Ht 165.1 cm (65\")   Wt 94.3 kg (208 lb)   SpO2 96%   BMI 34.61 kg/m²     Physical Exam  Constitutional:       General: She is not in acute distress.     Appearance: She is not toxic-appearing.   HENT:      " "Mouth/Throat:      Mouth: Mucous membranes are dry.   Eyes:      Extraocular Movements: Extraocular movements intact.      Pupils: Pupils are equal, round, and reactive to light.   Cardiovascular:      Rate and Rhythm: Normal rate and regular rhythm.      Heart sounds: Murmur (Holosystolic 3 out of 6) heard.   Pulmonary:      Effort: Pulmonary effort is normal.      Breath sounds: Rales present. No wheezing.   Abdominal:      General: Abdomen is flat. Bowel sounds are normal. There is no distension.      Tenderness: There is no abdominal tenderness.   Musculoskeletal:      Right lower leg: Edema present.      Left lower leg: Edema present.   Skin:     General: Skin is warm.      Capillary Refill: Capillary refill takes less than 2 seconds.      Findings: No lesion.   Neurological:      General: No focal deficit present.      Mental Status: She is alert.   Psychiatric:         Mood and Affect: Mood normal.         Thought Content: Thought content normal.         Laboratory data:    I have reviewed the labs done in the emergency room.    Results from last 7 days   Lab Units 07/16/25  2231   SODIUM mmol/L 121*   POTASSIUM mmol/L 4.5   CHLORIDE mmol/L 88*   CO2 mmol/L 21.4*   BUN mg/dL 15.0   CREATININE mg/dL 0.96   CALCIUM mg/dL 8.4*   BILIRUBIN mg/dL 0.4   ALK PHOS U/L 47   ALT (SGPT) U/L 25   AST (SGOT) U/L 36*   GLUCOSE mg/dL 134*     Results from last 7 days   Lab Units 07/16/25  2231   WBC 10*3/mm3 8.40   HEMOGLOBIN g/dL 10.2*   HEMATOCRIT % 29.4*   PLATELETS 10*3/mm3 218         Results from last 7 days   Lab Units 07/16/25  2231   HSTROP T ng/L 35*     Results from last 7 days   Lab Units 07/16/25  2231   PROBNP pg/mL 6,154.0*                       Invalid input(s): \"USDES\", \"NITRITITE\", \"BACT\", \"EP\"    Pain Management Panel          Latest Ref Rng & Units 3/4/2025   Pain Management Panel   Creatinine, Urine mg/dL 10.9        EKG:      Appears to be a sinus rhythm, normal rate, appears to have no ST changes.  " Appears similar to prior EKG    Radiology:    No radiology results for the last 3 days    Assessment:    Acute on chronic diastolic CHF, POA  Acute on chronic hyponatremia, POA  History of asthma  Hypertension  Spinal stenosis    Plan:    Admit for observation    CHF/aortic stenosis:  Does follow with Dr. Lovelace.  Ordered Lasix for diuresis. will order repeat echo to evaluate any worsening of underlying aortic stenosis.  I suspect volume overloaded in setting of no routine diuretic use, decreased mobility, and steroid use over the last few weeks.  Fluid restrict at 1500.  Add additional guideline medications as blood pressure can tolerate    Hyponatremia:  Chronically low in the 129-132 range typically.  Suspect related to volume overload currently.  Will add urine electrolytes.  Monitor with diuresis.  Fluid restrict at 1500.  If fails to improve would ask nephrology to follow-up    Otherwise continue rest of medications.  Plan of care discussed with patient and spouse    Risk Assessment: High  DVT Prophylaxis: Heparin  Code Status: Full  Diet: Fluid restriction    Advance Care Planning   ACP discussion was held with the patient during this visit. Patient has an advance directive in EMR which is still valid.            Amina Lei DO  07/16/25  23:58 EDT    Dictated utilizing Dragon dictation.

## 2025-07-17 NOTE — OUTREACH NOTE
Medical Week 2 Survey      Flowsheet Row Responses   [facility] patient discharged from? [place]   Does the patient have one of the following disease processes/diagnoses(primary or secondary)? Other   Week 2 attempt successful? No   Unsuccessful attempts Attempt 1   Revoke Readmitted            Linn OQUENDO - Registered Nurse

## 2025-07-18 ENCOUNTER — READMISSION MANAGEMENT (OUTPATIENT)
Dept: CALL CENTER | Facility: HOSPITAL | Age: 70
End: 2025-07-18
Payer: MEDICARE

## 2025-07-18 ENCOUNTER — TELEPHONE (OUTPATIENT)
Dept: CARDIOLOGY | Facility: CLINIC | Age: 70
End: 2025-07-18
Payer: MEDICARE

## 2025-07-18 VITALS
HEART RATE: 101 BPM | BODY MASS INDEX: 36.95 KG/M2 | RESPIRATION RATE: 18 BRPM | OXYGEN SATURATION: 91 % | DIASTOLIC BLOOD PRESSURE: 91 MMHG | WEIGHT: 221.78 LBS | HEIGHT: 65 IN | SYSTOLIC BLOOD PRESSURE: 144 MMHG | TEMPERATURE: 98.5 F

## 2025-07-18 LAB
ANION GAP SERPL CALCULATED.3IONS-SCNC: 10.9 MMOL/L (ref 5–15)
ANION GAP SERPL CALCULATED.3IONS-SCNC: 8.7 MMOL/L (ref 5–15)
ANION GAP SERPL CALCULATED.3IONS-SCNC: 9.8 MMOL/L (ref 5–15)
BASOPHILS # BLD AUTO: 0.04 10*3/MM3 (ref 0–0.2)
BASOPHILS NFR BLD AUTO: 0.7 % (ref 0–1.5)
BUN SERPL-MCNC: 12 MG/DL (ref 8–23)
BUN SERPL-MCNC: 14 MG/DL (ref 8–23)
BUN SERPL-MCNC: 15 MG/DL (ref 8–23)
BUN/CREAT SERPL: 13.6 (ref 7–25)
BUN/CREAT SERPL: 14.1 (ref 7–25)
BUN/CREAT SERPL: 14.3 (ref 7–25)
CALCIUM SPEC-SCNC: 8.2 MG/DL (ref 8.6–10.5)
CALCIUM SPEC-SCNC: 8.3 MG/DL (ref 8.6–10.5)
CALCIUM SPEC-SCNC: 8.6 MG/DL (ref 8.6–10.5)
CHLORIDE SERPL-SCNC: 86 MMOL/L (ref 98–107)
CHLORIDE SERPL-SCNC: 89 MMOL/L (ref 98–107)
CHLORIDE SERPL-SCNC: 90 MMOL/L (ref 98–107)
CO2 SERPL-SCNC: 25.2 MMOL/L (ref 22–29)
CO2 SERPL-SCNC: 26.3 MMOL/L (ref 22–29)
CO2 SERPL-SCNC: 27.1 MMOL/L (ref 22–29)
CREAT SERPL-MCNC: 0.88 MG/DL (ref 0.57–1)
CREAT SERPL-MCNC: 0.99 MG/DL (ref 0.57–1)
CREAT SERPL-MCNC: 1.05 MG/DL (ref 0.57–1)
DEPRECATED RDW RBC AUTO: 42.8 FL (ref 37–54)
EGFRCR SERPLBLD CKD-EPI 2021: 57.6 ML/MIN/1.73
EGFRCR SERPLBLD CKD-EPI 2021: 61.9 ML/MIN/1.73
EGFRCR SERPLBLD CKD-EPI 2021: 71.2 ML/MIN/1.73
EOSINOPHIL # BLD AUTO: 0.13 10*3/MM3 (ref 0–0.4)
EOSINOPHIL NFR BLD AUTO: 2.3 % (ref 0.3–6.2)
ERYTHROCYTE [DISTWIDTH] IN BLOOD BY AUTOMATED COUNT: 12.6 % (ref 12.3–15.4)
GLUCOSE SERPL-MCNC: 104 MG/DL (ref 65–99)
GLUCOSE SERPL-MCNC: 111 MG/DL (ref 65–99)
GLUCOSE SERPL-MCNC: 175 MG/DL (ref 65–99)
HCT VFR BLD AUTO: 30.3 % (ref 34–46.6)
HGB BLD-MCNC: 10.4 G/DL (ref 12–15.9)
IMM GRANULOCYTES # BLD AUTO: 0.04 10*3/MM3 (ref 0–0.05)
IMM GRANULOCYTES NFR BLD AUTO: 0.7 % (ref 0–0.5)
LYMPHOCYTES # BLD AUTO: 1.38 10*3/MM3 (ref 0.7–3.1)
LYMPHOCYTES NFR BLD AUTO: 24.8 % (ref 19.6–45.3)
MCH RBC QN AUTO: 31.7 PG (ref 26.6–33)
MCHC RBC AUTO-ENTMCNC: 34.3 G/DL (ref 31.5–35.7)
MCV RBC AUTO: 92.4 FL (ref 79–97)
MONOCYTES # BLD AUTO: 0.41 10*3/MM3 (ref 0.1–0.9)
MONOCYTES NFR BLD AUTO: 7.4 % (ref 5–12)
NEUTROPHILS NFR BLD AUTO: 3.56 10*3/MM3 (ref 1.7–7)
NEUTROPHILS NFR BLD AUTO: 64.1 % (ref 42.7–76)
NRBC BLD AUTO-RTO: 0 /100 WBC (ref 0–0.2)
PLATELET # BLD AUTO: 212 10*3/MM3 (ref 140–450)
PMV BLD AUTO: 9.4 FL (ref 6–12)
POTASSIUM SERPL-SCNC: 3.4 MMOL/L (ref 3.5–5.2)
POTASSIUM SERPL-SCNC: 3.6 MMOL/L (ref 3.5–5.2)
POTASSIUM SERPL-SCNC: 3.9 MMOL/L (ref 3.5–5.2)
RBC # BLD AUTO: 3.28 10*6/MM3 (ref 3.77–5.28)
SODIUM SERPL-SCNC: 124 MMOL/L (ref 136–145)
SODIUM SERPL-SCNC: 124 MMOL/L (ref 136–145)
SODIUM SERPL-SCNC: 125 MMOL/L (ref 136–145)
WBC NRBC COR # BLD AUTO: 5.56 10*3/MM3 (ref 3.4–10.8)

## 2025-07-18 PROCEDURE — 25010000002 FUROSEMIDE PER 20 MG: Performed by: FAMILY MEDICINE

## 2025-07-18 PROCEDURE — 85025 COMPLETE CBC W/AUTO DIFF WBC: CPT | Performed by: INTERNAL MEDICINE

## 2025-07-18 PROCEDURE — 80048 BASIC METABOLIC PNL TOTAL CA: CPT | Performed by: INTERNAL MEDICINE

## 2025-07-18 PROCEDURE — 94799 UNLISTED PULMONARY SVC/PX: CPT

## 2025-07-18 PROCEDURE — 94761 N-INVAS EAR/PLS OXIMETRY MLT: CPT

## 2025-07-18 PROCEDURE — 25010000002 HEPARIN (PORCINE) PER 1000 UNITS: Performed by: FAMILY MEDICINE

## 2025-07-18 PROCEDURE — 25010000002 HEPARIN LOCK FLUSH PER 10 UNITS: Performed by: INTERNAL MEDICINE

## 2025-07-18 PROCEDURE — 99239 HOSP IP/OBS DSCHRG MGMT >30: CPT | Performed by: INTERNAL MEDICINE

## 2025-07-18 PROCEDURE — 63710000001 PREDNISONE PER 1 MG: Performed by: INTERNAL MEDICINE

## 2025-07-18 RX ORDER — SODIUM CHLORIDE 0.9 % (FLUSH) 0.9 %
10 SYRINGE (ML) INJECTION EVERY 12 HOURS SCHEDULED
Status: DISCONTINUED | OUTPATIENT
Start: 2025-07-18 | End: 2025-07-18 | Stop reason: HOSPADM

## 2025-07-18 RX ORDER — HEPARIN SODIUM (PORCINE) LOCK FLUSH IV SOLN 100 UNIT/ML 100 UNIT/ML
5 SOLUTION INTRAVENOUS AS NEEDED
Status: DISCONTINUED | OUTPATIENT
Start: 2025-07-18 | End: 2025-07-18 | Stop reason: HOSPADM

## 2025-07-18 RX ORDER — FUROSEMIDE 40 MG/1
40 TABLET ORAL DAILY
Qty: 30 TABLET | Refills: 2 | Status: SHIPPED | OUTPATIENT
Start: 2025-07-18

## 2025-07-18 RX ORDER — SODIUM CHLORIDE 0.9 % (FLUSH) 0.9 %
20 SYRINGE (ML) INJECTION AS NEEDED
Status: DISCONTINUED | OUTPATIENT
Start: 2025-07-18 | End: 2025-07-18 | Stop reason: HOSPADM

## 2025-07-18 RX ORDER — SODIUM CHLORIDE 0.9 % (FLUSH) 0.9 %
10 SYRINGE (ML) INJECTION AS NEEDED
Status: DISCONTINUED | OUTPATIENT
Start: 2025-07-18 | End: 2025-07-18 | Stop reason: HOSPADM

## 2025-07-18 RX ORDER — PREDNISONE 5 MG/1
TABLET ORAL
Qty: 48 TABLET | Refills: 0 | Status: SHIPPED | OUTPATIENT
Start: 2025-07-19 | End: 2025-08-03

## 2025-07-18 RX ORDER — SODIUM CHLORIDE 9 MG/ML
40 INJECTION, SOLUTION INTRAVENOUS AS NEEDED
Status: DISCONTINUED | OUTPATIENT
Start: 2025-07-18 | End: 2025-07-18 | Stop reason: HOSPADM

## 2025-07-18 RX ADMIN — VALSARTAN 160 MG: 80 TABLET ORAL at 08:32

## 2025-07-18 RX ADMIN — HYDROCODONE BITARTRATE AND ACETAMINOPHEN 1 TABLET: 5; 325 TABLET ORAL at 10:38

## 2025-07-18 RX ADMIN — BUDESONIDE AND FORMOTEROL FUMARATE DIHYDRATE 2 PUFF: 160; 4.5 AEROSOL RESPIRATORY (INHALATION) at 07:22

## 2025-07-18 RX ADMIN — HEPARIN 500 UNITS: 100 SYRINGE at 14:26

## 2025-07-18 RX ADMIN — ISOSORBIDE MONONITRATE 30 MG: 30 TABLET, EXTENDED RELEASE ORAL at 06:07

## 2025-07-18 RX ADMIN — FUROSEMIDE 20 MG: 10 INJECTION, SOLUTION INTRAVENOUS at 08:33

## 2025-07-18 RX ADMIN — Medication 10 ML: at 08:35

## 2025-07-18 RX ADMIN — PANTOPRAZOLE SODIUM 40 MG: 40 TABLET, DELAYED RELEASE ORAL at 06:07

## 2025-07-18 RX ADMIN — FERROUS SULFATE TAB EC 324 MG (65 MG FE EQUIVALENT) 324 MG: 324 (65 FE) TABLET DELAYED RESPONSE at 08:33

## 2025-07-18 RX ADMIN — PREDNISONE 40 MG: 20 TABLET ORAL at 08:32

## 2025-07-18 RX ADMIN — DULOXETINE 60 MG: 30 CAPSULE, DELAYED RELEASE ORAL at 08:32

## 2025-07-18 RX ADMIN — ALLOPURINOL 100 MG: 100 TABLET ORAL at 08:33

## 2025-07-18 RX ADMIN — LEVOTHYROXINE SODIUM 50 MCG: 50 TABLET ORAL at 08:32

## 2025-07-18 RX ADMIN — PREGABALIN 100 MG: 75 CAPSULE ORAL at 06:07

## 2025-07-18 RX ADMIN — POTASSIUM CITRATE 10 MEQ: 10 TABLET, EXTENDED RELEASE ORAL at 08:32

## 2025-07-18 RX ADMIN — TIOTROPIUM BROMIDE INHALATION SPRAY 2 PUFF: 3.12 SPRAY, METERED RESPIRATORY (INHALATION) at 07:22

## 2025-07-18 RX ADMIN — HYDROCODONE BITARTRATE AND ACETAMINOPHEN 1 TABLET: 5; 325 TABLET ORAL at 02:04

## 2025-07-18 RX ADMIN — HEPARIN SODIUM 5000 UNITS: 5000 INJECTION INTRAVENOUS; SUBCUTANEOUS at 06:07

## 2025-07-18 RX ADMIN — PREGABALIN 100 MG: 75 CAPSULE ORAL at 13:50

## 2025-07-18 NOTE — CASE MANAGEMENT/SOCIAL WORK
Case Management Discharge Note                Selected Continued Care - Admitted Since 7/16/2025       Destination    No services have been selected for the patient.                Durable Medical Equipment    No services have been selected for the patient.                Dialysis/Infusion    No services have been selected for the patient.                Home Medical Care    No services have been selected for the patient.                Therapy    No services have been selected for the patient.                Community Resources Coordination complete.      Service Provider Services Address Phone Fax Patient Preferred    [facility] [address] [place] [address] [phone] --       Internal Comment last updated by Melissa Zayas RN 7/18/2025 1400    Accepted by [facility]    No services have been selected for the patient.                    Selected Continued Care - Episodes Includes continued care and service providers with selected services from the active episodes listed below      Asthma - External Fill Episode start date: 3/11/2024   There are no active outsourced providers for this episode.                 Selected Continued Care - Prior Encounters Includes continued care and service providers with selected services from prior encounters from 4/17/2025 to 7/18/2025      Discharged on 7/11/2025 Admission date: 7/4/2025 - Discharge disposition: Home or Self Care      Destination       Service Provider Services Address Phone Fax Patient Preferred    [facility] [address] [facility] [address] [phone] --              Therapy       Service Provider Services Address Phone Fax Patient Preferred    [facility] [address] [place] [address]  [phone] --                          Transportation Services  Transportation: Private Transportation  Private: Car    Final Discharge Disposition Code: 01 - home or self-care

## 2025-07-18 NOTE — DISCHARGE SUMMARY
[facility]   DISCHARGE SUMMARY      Name:  Raquel Mariee   Age:  69 y.o.  Sex:  female  :  1955  MRN:  [id number]   Visit Number:  [id number]    Admission Date:  2025  Date of Discharge:  2025  Primary Care Physician:  Sanjeev Rawls MD    Important issues to note:    Start: Jardiance, Lasix, prednisone taper  Stop: Nothing  Follow up: PCP and DSM, pulmonology, cardiology  Brief Summary: Presented with dyspnea low-sodium due to CHF, volume overload, asthma.  Patient had been on steroids for arthritis and probably increased the fluid on the body.  I do feel this had exacerbated her asthma somewhat as well as she was severely wheezing.  Improved with diuretics and steroids.  Sodium was still fairly low at discharge but patient was agreeing to get it checked again outpatient and agreeing to take outpatient diuretics understood the risk including that she could have worsening renal function.    Discharge Diagnoses:   Acute on chronic diastolic CHF, POA  Acute on chronic hyponatremia, POA  History of asthma  Hypertension  Spinal stenosis    Problem List:     Active Hospital Problems    Diagnosis  POA    **Hyponatremia [E87.1]  Yes      Resolved Hospital Problems   No resolved problems to display.     Presenting Problem:    Chief Complaint   Patient presents with    Shortness of Breath      Consults:     Consulting Physician(s)                     None                History Of Presenting Illness:       Patient is a 69-year-old history of aortic valve stenosis, diastolic CHF, CVID, diabetes, COPD, asthma, sleep apnea, who presented to the emergency room with complaints of shortness of breath.  Patient states progressive shortness of breath throughout the day, had tried breathing treatment at home with no improvement.  She has had decreased mobility, recently got out of the hospital in [place] due to spinal issues, underwent injection recently.  Is due to follow-up  with them soon.  She has been on prednisone this week as well for arthritic flare.  She notes typically she does not take her Lasix at home, only as needed, this is after she receives a Gammagard infusion.  Denies any recent dietary changes.  Does not routinely monitor weight     In the ER she is overall hemodynamically stable.  Her labs were notable for a sodium of 121, baseline around 130.  proBNP was elevated.  Chest x-ray appeared to show bilateral pulmonary edema.  EKG with no ischemic changes.  She received diuretic therapy.  We were asked to admit for observation     Edited by: Siva Groves DO at 7/18/2025 1243    Hospital Course:    This patient's problems and plans were partially entered by my partner and updated as appropriate by me 07/17/25.    7/18/2025: Patient breathing better. Agreeable to DC. Sodium still low but wants to go home and get it checked real soon. Understands risk to the kidney with outpatient diuretic will have pcp recheck labs.    Acute on chronic diastolic CHF  Aortic stenosis  Aortic regurgitation    -Suspect volume overload due to valvular heart disease and CHF in combination with steroid use for arthritis. Will hold off on the echo for right now since just had a few months ago.  -IV Lasix, GDMT as tolerated, Jardiance    Asthma exacerbation:  - resume steroids, continue bronchodilators and nebs       Hyponatremia:  Sodium 121 at presentation, baseline about 130 suspect hypervolemic hyponatremia due to CHF  -Diuresis IV diuretics while inpatient transition to oral scheduled had previously been on as needed  -Serial basic metabolic panel suggest sodium is improving with diuresis creatinine stable sodium 125 at discharge     CVID  - Receives Gammagard outpatient infusions port in place    Code Status: Full  Diet: Cardiac  Disposition: Discharged to home independently  Edited by: Siva Groves DO at 7/18/2025 1255      Vital Signs:    Temp:  [97.8 °F (36.6 °C)-98.8 °F  (37.1 °C)] 98.5 °F (36.9 °C)  Heart Rate:  [] 101  Resp:  [18-20] 18  BP: (128-165)/(75-91) 144/91    Physical Exam:    Constitutional: No acute distress, awake, alert  HENT: NCAT, mucous membranes moist  Respiratory: Basilar Rales improved bilaterally, respiratory effort normal, audible wheeze resolved  Cardiovascular: RRR, 2 out of 6 murmur  Gastrointestinal: Positive bowel sounds, soft, nontender, nondistended  Musculoskeletal: trace+ bilateral ankle edema  Psychiatric: Appropriate affect, cooperative  Neurologic: Oriented x 3, speech clear  Skin: No rashes  Devices: Port in place  Edited by: Siva Groves DO at 7/18/2025 1243    Pertinent Lab Results:     Results from last 7 days   Lab Units 07/18/25  0746 07/18/25  0514 07/18/25  0013 07/17/25  1033 07/16/25  2231   SODIUM mmol/L 125* 124* 124*   < > 121*   POTASSIUM mmol/L 3.4* 3.6 3.9   < > 4.5   CHLORIDE mmol/L 90* 86* 89*   < > 88*   CO2 mmol/L 25.2 27.1 26.3   < > 21.4*   BUN mg/dL 12.0 14.0 15.0   < > 15.0   CREATININE mg/dL 0.88 0.99 1.05*   < > 0.96   CALCIUM mg/dL 8.2* 8.6 8.3*   < > 8.4*   BILIRUBIN mg/dL  --   --   --   --  0.4   ALK PHOS U/L  --   --   --   --  47   ALT (SGPT) U/L  --   --   --   --  25   AST (SGOT) U/L  --   --   --   --  36*   GLUCOSE mg/dL 104* 111* 175*   < > 134*    < > = values in this interval not displayed.     Results from last 7 days   Lab Units 07/18/25  0514 07/16/25  2231   WBC 10*3/mm3 5.56 8.40   HEMOGLOBIN g/dL 10.4* 10.2*   HEMATOCRIT % 30.3* 29.4*   PLATELETS 10*3/mm3 212 218         Results from last 7 days   Lab Units 07/16/25  2336 07/16/25  2231   HSTROP T ng/L 39* 35*     Results from last 7 days   Lab Units 07/16/25  2231   PROBNP pg/mL 6,154.0*                       Pertinent Radiology Results:    Imaging Results (All)       Procedure Component Value Units Date/Time    XR Chest 1 View [[id number]] Collected: 07/17/25 0824     Updated: 07/17/25 0840    Narrative:      PROCEDURE: XR CHEST 1 VW-      HISTORY: SOA Triage Protocol     COMPARISON: 5/31/2025.     FINDINGS: Apical lordotic positioning. There is mild elevation of the  right hemidiaphragm. There is a patchy linear density in the right lung  which is partially obscured by overlying EKG leads which appears similar  to the prior. There is a left subclavian chest port terminating  centrally. There is mild bilateral interstitial lung disease which is  more pronounced within the lung bases similar to the prior. There are  post thoracotomy changes of the right lung. There is no pneumothorax.   There are no acute osseous abnormalities.       Impression:      Stable chest.              Images were reviewed, interpreted, and dictated by Dr. Lisa Castellon MD  Transcribed by Curtis Nance PA-C.     This report was signed and finalized on 7/17/2025 8:38 AM by Lisa Castellon MD.               Echo:    Results for orders placed in visit on 03/28/25    Adult Transthoracic Echo Complete W/ Cont if Necessary Per Protocol 03/31/2025  2:42 PM    Interpretation Summary  1.  Normal left ventricular size and systolic function, LVEF 50-55%.  2.  Moderate to severe concentric LVH.  3.  Grade 2 diastolic dysfunction.  4.  Normal right ventricular size and systolic function.  5.  Moderately increased left atrial volume index.  6.  Moderate calcification of the aortic valve with moderate aortic stenosis.  7.  Moderate aortic regurgitation.  8.  Mild mitral regurgitation.    Condition on Discharge:      Stable.    Code status during the hospital stay:    Code Status and Medical Interventions: CPR (Attempt to Resuscitate); Full Support   Ordered at: 07/17/25 0002     Code Status (Patient has no pulse and is not breathing):    CPR (Attempt to Resuscitate)     Medical Interventions (Patient has pulse or is breathing):    Full Support     Discharge Disposition:    Home or Self Care    Discharge Medications:       Discharge Medications        New Medications        Instructions Start  Date   empagliflozin 10 MG tablet tablet  Commonly known as: JARDIANCE   10 mg, Oral, Daily             Changes to Medications        Instructions Start Date   furosemide 40 MG tablet  Commonly known as: Lasix  What changed:   when to take this  reasons to take this   40 mg, Oral, Daily      predniSONE 5 MG tablet  Commonly known as: DELTASONE  What changed:   medication strength  See the new instructions.  Another medication with the same name was removed. Continue taking this medication, and follow the directions you see here.   Take 6 tablets by mouth Daily With Breakfast for 3 days, THEN 4 tablets Daily With Breakfast for 3 days, THEN 3 tablets Daily With Breakfast for 3 days, THEN 2 tablets Daily With Breakfast for 3 days, THEN 1 tablet Daily With Breakfast for 3 days.   Start Date: July 19, 2025            Continue These Medications        Instructions Start Date   albuterol sulfate  (90 Base) MCG/ACT inhaler  Commonly known as: Ventolin HFA   Inhale 2 puffs Every 4 (Four) to 6 (Six) Hours As Needed for cough, wheeze, and shortness of breath      allopurinol 100 MG tablet  Commonly known as: ZYLOPRIM   100 mg, Oral, Daily      Antacid Maximum 1000 MG chewable tablet  Generic drug: calcium carbonate   500 mg, 3 Times Daily      betamethasone valerate 0.1 % cream  Commonly known as: VALISONE   Apply topically to the appropriate area as directed Daily.      Breztri Aerosphere 160-9-4.8 MCG/ACT aerosol inhaler  Generic drug: Budeson-Glycopyrrol-Formoterol   Inhale 2 puffs by mouth 2 (Two) Times a Day. Rinse out mouth after use      cholecalciferol 25 MCG (1000 UT) tablet  Commonly known as: VITAMIN D3   1,000 Units, Daily      denosumab 60 MG/ML solution prefilled syringe syringe  Commonly known as: PROLIA   60 mg, Every 6 Months      DULoxetine 60 MG capsule  Commonly known as: Cymbalta   60 mg, Oral, Daily      estradiol 0.1 MG/GM vaginal cream  Commonly known as: ESTRACE   Insert 0.5 grams vaginally  twice weekly      ferrous sulfate 325 (65 FE) MG EC tablet   2 tablets, Daily With Breakfast      fexofenadine 180 MG tablet  Commonly known as: ALLEGRA   180 mg, Daily      Gammagard 30 GM/300ML solution infusion  Generic drug: immune globulin (human)   50 grams IV every 4 weeks      glucosamine-chondroitin 500-400 MG capsule capsule   1 capsule, 2 Times Daily With Meals      guaiFENesin-codeine 100-10 MG/5ML solution/syrup  Commonly known as: ROMILAR-AC   5 mL, Oral, 3 Times Daily PRN      HYDROcodone-acetaminophen 5-325 MG per tablet  Commonly known as: NORCO   1 tablet, Oral, Every 6 Hours PRN      ipratropium-albuterol 0.5-2.5 mg/3 ml nebulizer  Commonly known as: DUO-NEB   Inhale the contents of 1 vial through a nebulizer every 4-6 hours as needed for cough,wheezing and shortness of breath.      isosorbide mononitrate 30 MG 24 hr tablet  Commonly known as: IMDUR   30 mg, Oral, Every Morning      levothyroxine 50 MCG tablet  Commonly known as: SYNTHROID, LEVOTHROID   50 mcg, Oral, Daily      Linzess 145 MCG capsule capsule  Generic drug: linaclotide   145 mcg, Oral, Every Morning Before Breakfast      Magnesium 250 MG tablet   500 mg, 2 Times Daily      montelukast 10 MG tablet  Commonly known as: SINGULAIR   10 mg, Oral, Every Night at Bedtime      multivitamin with minerals tablet tablet   1 tablet, Daily      Nucala 100 MG/ML solution auto-injector  Generic drug: Mepolizumab   100 mg, Every 28 Days      omeprazole 40 MG capsule  Commonly known as: priLOSEC   40 mg, Oral, 2 Times Daily      ondansetron 4 MG tablet  Commonly known as: ZOFRAN   4 mg, Oral, Every 8 Hours PRN      potassium citrate 10 MEQ (1080 MG) CR tablet  Commonly known as: UROCIT-K   10 mEq, Oral, Daily      pregabalin 100 MG capsule  Commonly known as: LYRICA   100 mg, Oral, Every 8 Hours Scheduled      Proctozone-HC 2.5 % rectal cream  Generic drug: Hydrocortisone (Perianal)   Apply rectally 2 times daily      Sinus Rinse Kit pack   use  once or twice daily as needed      Sitz Bath misc   1 each, Not Applicable, As Needed      tiZANidine 4 MG tablet  Commonly known as: ZANAFLEX   4 mg, Oral, 2 Times Daily PRN      ubrogepant 100 MG tablet  Commonly known as: Ubrelvy   100 mg, Oral, Once As Needed      valsartan 160 MG tablet  Commonly known as: DIOVAN   160 mg, Oral, Daily      vitamin B-12 1000 MCG tablet  Commonly known as: CYANOCOBALAMIN   1,000 mcg, Daily      Xhance 93 MCG/ACT Exhaler Suspension  Generic drug: Fluticasone Propionate   1 spray, Nasal, 2 Times Daily             Stop These Medications      acetaminophen-codeine 300-30 MG per tablet  Commonly known as: TYLENOL with CODEINE #3            Discharge Diet:     Diet Instructions       Advance Diet As Tolerated -Target Diet: Heart healthy      Target Diet: Heart healthy          Activity at Discharge:       Follow-up Appointments:    Additional Instructions for the Follow-ups that You Need to Schedule       Ambulatory Referral to Disease State Management   As directed      To dept:  [facility] [[id number]]   What program(s) are you referring for?: Heart Failure Asthma Medication Management   Follow-up needed: Yes        Discharge Follow-up with PCP   As directed       Currently Documented PCP:    Sanjeev Rawls MD    PCP Phone Number:    [phone]     Follow Up Details: 1 week        Discharge Follow-up with Specified Provider: Cardiology - Dr. Lovelace; 2 Weeks   As directed      To: Cardiology - Dr. Lovelace   Follow Up: 2 Weeks        Discharge Follow-up with Specified Provider: Pulmonology (Leila Gloria); 2 Weeks   As directed      To: Pulmonology (Leila Gloria)   Follow Up: 2 Weeks               Follow-up Information       Sanjeev Rawls MD .    Specialty: Internal Medicine  Why: 1 week  Contact information:  [address] [place] [id number]  [phone]                           Future Appointments   Date Time Provider Department Center   7/23/2025  10:00 AM Joel Griffin APRN [facility] (Cl   7/24/2025  8:30 AM TREATMENT RM 11 BH [facility] OP INFUS BH [facility] OPI [facility]   8/1/2025 10:00 AM Lakshmi Larios PA-C [facility] NS [facility]   8/4/2025  9:00 AM Sanjeev Rawls MD [facility] PC RI MR [facility]   8/22/2025  9:00 AM TREATMENT RM 8 BH [facility] OP INFUS BH [facility] OPI [facility]   9/23/2025  9:00 AM Mingo Lovelace MD [facility] CD BG R [facility]   10/23/2025  9:00 AM Sanjeev Rawls MD [facility] PC RI MR [facility]     Test Results Pending at Discharge:           Siva Groves DO  07/18/25  12:55 EDT    Time: I spent 45 minutes on this discharge activity which included: face-to-face encounter with the patient, reviewing the data in the system, coordination of the care with the nursing staff as well as consultants, documentation, and entering orders.     Dictated utilizing Dragon dictation.

## 2025-07-18 NOTE — CASE MANAGEMENT/SOCIAL WORK
Case Management/Social Work    Patient Name:  Raquel Mariee  YOB: 1955  MRN: [id number]  Admit Date:  7/16/2025        08:46 EDT   Discharge Plan       Row Name 07/18/25 0844       Plan    Plan home/spouse/continue outpt PT [facility]                        Electronically signed by:  Melissa Zayas RN  07/18/25 08:46 EDT

## 2025-07-18 NOTE — TELEPHONE ENCOUNTER
Hub staff attempted to follow warm transfer process and was unsuccessful     Caller: [facility]    Relationship to patient: PROVIDER    Best call back number: [phone]      Patient is needing:  ANA LAURA CALLED TO SCHEDULE HOSPITAL FOLLOW UP FOR 2 WEEKS FROM TODAY WITH DR MERRITT. NEXT AVAILABLE WAS NOT UNTIL SEPTEMBER. PLEASE CALL PATIENT WITH EARLIER AVAILABILITY

## 2025-07-18 NOTE — OUTREACH NOTE
Prep Survey      Flowsheet Row Responses   [facility] [place] patient discharged from? [place]   Is LACE score < 7 ? No   Eligibility [facility]   Date of Admission 07/16/25   Date of Discharge 07/18/25   Discharge diagnosis Hyponatremia   Does the patient have one of the following disease processes/diagnoses(primary or secondary)? Other   Comments regarding appointments Outpatient Rehabilitation   Prep survey completed? Yes            Cally GASTON - Registered Nurse

## 2025-07-19 ENCOUNTER — NURSE TRIAGE (OUTPATIENT)
Dept: CALL CENTER | Facility: HOSPITAL | Age: 70
End: 2025-07-19
Payer: MEDICARE

## 2025-07-19 NOTE — TELEPHONE ENCOUNTER
"Pt called to see if there are discharge outpt orders to have labs checked. According to AVS and noted by Dr. Groves, pt should have PCP recheck labs. This explained to pt and pt will call Dr. Rawls Monday am.   Reason for Disposition   Health Information question, no triage required and triager able to answer question    Additional Information   Negative: [1] Caller is not with the adult (patient) AND [2] reporting urgent symptoms   Negative: Lab result questions   Negative: Medication questions   Negative: Caller can't be reached by phone   Negative: Caller has already spoken to PCP or another triager   Negative: RN needs further essential information from caller in order to complete triage   Negative: Requesting regular office appointment   Negative: [1] Caller requesting NON-URGENT health information AND [2] PCP's office is the best resource    Answer Assessment - Initial Assessment Questions  1. REASON FOR CALL or QUESTION: \"What is your reason for calling today?\" or \"How can I best help you?\" or \"What question do you have that I can help answer?\"      Pt called to see if there are discharge outpt orders to have labs checked. According to AVS and noted by Dr. Groves, pt should have PCP recheck labs. This explained to pt and pt will call Dr. Rawls Monday am.    Protocols used: Information Only Call - No Triage-ADULT-    "

## 2025-07-21 ENCOUNTER — TRANSITIONAL CARE MANAGEMENT TELEPHONE ENCOUNTER (OUTPATIENT)
Dept: CALL CENTER | Facility: HOSPITAL | Age: 70
End: 2025-07-21
Payer: MEDICARE

## 2025-07-21 DIAGNOSIS — E87.1 HYPONATREMIA: Primary | ICD-10-CM

## 2025-07-21 DIAGNOSIS — M48.061 SPINAL STENOSIS OF LUMBAR REGION WITHOUT NEUROGENIC CLAUDICATION: ICD-10-CM

## 2025-07-21 RX ORDER — HYDROCODONE BITARTRATE AND ACETAMINOPHEN 5; 325 MG/1; MG/1
1 TABLET ORAL EVERY 6 HOURS PRN
Qty: 25 TABLET | Refills: 0 | Status: SHIPPED | OUTPATIENT
Start: 2025-07-21

## 2025-07-21 NOTE — TELEPHONE ENCOUNTER
Provider:  Dain  Surgery/Procedure:  CLAUDE  Surgery/Procedure Date:    Last visit:   NA  Next visit: 8/1/25     Reason for call:  Refill request for Norco 5-325 mg pending. Seen inpatient for L2-3 disc herniation.    Charly:

## 2025-07-21 NOTE — OUTREACH NOTE
Call Center TCM Note      Flowsheet Row Responses   [facility] patient discharged from? [place]   Does the patient have one of the following disease processes/diagnoses(primary or secondary)? Other   TCM attempt successful? No   Unsuccessful attempts Attempt 1  [Spouse and daughter on release]   Call Status Left message            Lopez DELUNA - Registered Nurse    7/21/2025, 10:41 EDT

## 2025-07-21 NOTE — TELEPHONE ENCOUNTER
"Tried to call patient back to schedule appt, \"call can not be completed, call back later\" was the message I got.    Relay     \"Can be scheduled with any next available provider with Dr. Lovelace, Leila Betancourt or Lula Sharpe.  Thanks-\"                  "

## 2025-07-21 NOTE — OUTREACH NOTE
Call Center TCM Note      Flowsheet Row Responses   [facility] [place] patient discharged from? [facility]   Does the patient have one of the following disease processes/diagnoses(primary or secondary)? Other   TCM attempt successful? No   Unsuccessful attempts Attempt 2            Lopez DELUNA - Registered Nurse    7/21/2025, 11:46 EDT

## 2025-07-22 ENCOUNTER — TRANSITIONAL CARE MANAGEMENT TELEPHONE ENCOUNTER (OUTPATIENT)
Dept: CALL CENTER | Facility: HOSPITAL | Age: 70
End: 2025-07-22
Payer: MEDICARE

## 2025-07-22 NOTE — OUTREACH NOTE
"Call Center TCM Note      Flowsheet Row Responses   [facility]   Does the patient have one of the following disease processes/diagnoses(primary or secondary)? Other   TCM attempt successful? Yes   Call start time 1307   Call end time 1311   Discharge diagnosis Hyponatremia   Is patient permission given to speak with other caregiver? Yes   List who call center can speak with Daughter   Person spoke with today (if not patient) and relationship Daughter   Meds reviewed with patient/caregiver? Yes   Is the patient having any side effects they believe may be caused by any medication additions or changes? Yes   Side effects comments  Steroids making pt \"jittery\" per daughter's report.   Does the patient have all medications ordered at discharge? Yes   Is the patient taking all medications as directed (includes completed medication regime)? Yes   Comments 8/4/2025  9:00 AM Arrive by 8:45 AM OFFICE VISIT   Does the patient have an appointment with their PCP within 7-14 days of discharge? Yes   What is the Home health agency?  outpatient PT at [facility] will begin in August   Comments The patient's daughter reports that the pt had no SOA or wheezing during their call this morning.   Did the patient receive a copy of their discharge instructions? Yes   Nursing interventions Reviewed instructions with patient   What is the patient's perception of their health status since discharge? Improving   Is the patient/caregiver able to teach back signs and symptoms related to disease process for when to call PCP? Yes   Is the patient/caregiver able to teach back signs and symptoms related to disease process for when to call 911? Yes   Is the patient/caregiver able to teach back the hierarchy of who to call/visit for symptoms/problems? PCP, Specialist, Home health nurse, Urgent Care, ED, 911 Yes   If the patient is a current smoker, are they able to teach back resources for " cessation? Not a smoker   TCM call completed? Yes   Call end time 1311            Nahed MOREAU - Registered Nurse    7/22/2025, 13:12 EDT

## 2025-07-23 ENCOUNTER — OFFICE VISIT (OUTPATIENT)
Dept: UROLOGY | Facility: CLINIC | Age: 70
End: 2025-07-23
Payer: MEDICARE

## 2025-07-23 VITALS
HEART RATE: 78 BPM | OXYGEN SATURATION: 100 % | SYSTOLIC BLOOD PRESSURE: 118 MMHG | HEIGHT: 65 IN | DIASTOLIC BLOOD PRESSURE: 84 MMHG | WEIGHT: 221 LBS | TEMPERATURE: 98.4 F | BODY MASS INDEX: 36.82 KG/M2

## 2025-07-23 DIAGNOSIS — N39.41 URGE INCONTINENCE OF URINE: Primary | ICD-10-CM

## 2025-07-23 DIAGNOSIS — N39.3 SUI (STRESS URINARY INCONTINENCE), MALE: ICD-10-CM

## 2025-07-23 PROBLEM — M54.16 LUMBAR RADICULOPATHY: Status: ACTIVE | Noted: 2025-04-23

## 2025-07-23 PROBLEM — E66.01 MORBID OBESITY: Status: ACTIVE | Noted: 2023-04-21

## 2025-07-23 PROBLEM — M54.50 LOW BACK PAIN: Status: ACTIVE | Noted: 2024-11-19

## 2025-07-23 PROBLEM — M46.1 INFLAMMATION OF SACROILIAC JOINT: Status: ACTIVE | Noted: 2024-11-19

## 2025-07-23 RX ORDER — ERENUMAB-AOOE 140 MG/ML
INJECTION, SOLUTION SUBCUTANEOUS
COMMUNITY

## 2025-07-23 RX ORDER — PREDNISONE 10 MG/1
TABLET ORAL
COMMUNITY

## 2025-07-23 RX ORDER — POLYETHYLENE GLYCOL 3350 17 G/17G
17 POWDER, FOR SOLUTION ORAL DAILY
COMMUNITY

## 2025-07-23 RX ORDER — DEXTROMETHORPHAN HBR, GUAIFENESIN 60; 1200 MG/1; MG/1
1 TABLET, EXTENDED RELEASE ORAL
COMMUNITY

## 2025-07-23 RX ORDER — VIBEGRON 75 MG/1
75 TABLET, FILM COATED ORAL DAILY
Qty: 30 TABLET | Refills: 11 | Status: SHIPPED | OUTPATIENT
Start: 2025-07-23

## 2025-07-23 NOTE — PROGRESS NOTES
Office Visit     Patient Name: Raquel Mariee  : 1955   MRN: [id number]     Patient or patient representative verbalized consent for the use of Ambient Listening during the visit with  LILIBETH Pulido for chart documentation. 2025  10:00 EDT    Chief Complaint:   Chief Complaint   Patient presents with    Follow-up     Urge incontinence of urine         Referring Provider: No ref. provider found    Primary Care Provider: Sanjeev Rawls MD     History of Present Illness  The patient presents for evaluation of overactive bladder.    Overactive Bladder  - She reports inconsistent symptoms, with two days without incidents followed by an accident yesterday.  - She suspects Lasix may contribute to her condition.  - Previously prescribed Vesicare was discontinued due very bothersome side effect.  - She urinates every couple of hours during the day and wakes every two hours at night.  - She experiences leakage a few times a day, often with urgency.  - She uses pads, changing them twice daily, and continues weekly estrogen cream.    Hypertension  - She also has hypertension.    Pain Management  -- seeing pain management for her back.      Subjective   Review of System:   As noted in HPI.    Past Medical History:   Diagnosis Date    Anxiety     Aortic valve stenosis     Cardiac murmur     Cataract, bilateral     CHF (congestive heart failure)     Cholelithiasis GB out    Chronic kidney disease     Stage III    Clostridium difficile infection     in her 30s    Colon polyp Ever since having colonoscopies.    Common variable immunodeficiency     IVIG infusions    Compression fracture of lumbar spine, non-traumatic     COPD (chronic obstructive pulmonary disease)     Coronary artery disease     Prinz metal angina & aortic stenosis    Depression     Diabetes mellitus     type II    Eosinophilic asthma     Fibromyalgia, primary     Full dentures     GERD (gastroesophageal reflux disease)      History of transfusion     in 1979 at [facility].  No reaction noted, patient states she does have an antibody from transfusion.    Hypertension     Hypogammaglobulinemia     Hypothyroidism     Irritable bowel syndrome     Migraines     Morbid obesity     Optic neuritis     Optic neuropathy     Osteoarthritis     Prinzmetal angina 1990    Pseudomonas infection 1998    lungs    PTSD (post-traumatic stress disorder)     Pulmonary edema     Renal insufficiency     Sinus tachycardia 1990    Sleep apnea     no longer uses/needs cpap    Stroke     TIA about 7 years ago    Tachycardia     TIA (transient ischemic attack) 2017    Trochanteric bursitis 01/25/2023     Past Surgical History:   Procedure Laterality Date    APPENDECTOMY      BUNIONECTOMY Bilateral     CARDIAC CATHETERIZATION  2017    no stents needed    CARPAL TUNNEL RELEASE Right     COLONOSCOPY  2021    ENDOMETRIAL ABLATION  1997    EYE SURGERY  ? About 6-8 years ago    Laser for glaucoma    FRACTURE SURGERY  1995    No hardware; Tibeal plateau    GASTRIC BYPASS      HAND SURGERY Left     No hardware    HEMORRHOIDECTOMY N/A 04/09/2024    Procedure: HEMORRHOID BANDING X2;  Surgeon: Melissa Beck MD;  Location: [facility] OR;  Service: General;  Laterality: N/A;    INTERSTIM PLACEMENT N/A 05/03/2023    Procedure: INTERSTIM STAGES 1 AND 2 LEAD AND GENERATOR PLACEMENT;  Surgeon: Abner Nation MD;  Location: [facility] OR;  Service: Urology;  Laterality: N/A;    POSTERIOR VAGINAL REPAIR N/A 08/02/2023    Procedure: POSTERIOR COLPORRHAPHY;  Surgeon: Kellen Galan MD;  Location:  [facility] OR;  Service: Gynecology;  Laterality: N/A;    RECTAL EXAMINATION UNDER ANESTHESIA N/A 04/09/2024    Procedure: RECTAL EXAM UNDER ANESTHESIA;  Surgeon: Melissa Beck MD;  Location: [facility] OR;  Service: General;  Laterality: N/A;    REPLACEMENT TOTAL KNEE BILATERAL      TEETH EXTRACTION      all of bottom teeth removed    THORACOTOMY      TONSILLECTOMY       TOTAL ABDOMINAL HYSTERECTOMY WITH SALPINGO OOPHORECTOMY      TRACHEAL SURGERY      Repaired via thoracotomy    TUBAL ABDOMINAL LIGATION  1983    VENOUS ACCESS DEVICE (PORT) INSERTION      port a cath in the left chest wall     Family History   Problem Relation Age of Onset    Diabetes Mother     Stroke Mother     Heart disease Mother     Hypertension Mother     Endometriosis Mother     Depression Mother     Diabetes Father     Hypertension Father     Stroke Father     Heart attack Father     Asthma Father     COPD Father     Dementia Father     Heart disease Father     COPD Sister     Asthma Sister     Cancer Sister         Nasal cell skin    Brain cancer Sister     Diabetes Sister     Stroke Sister     Heart attack Sister     Endometriosis Sister     Depression Sister     Arthritis Sister         Rheumatoid    Hypertension Sister     Kidney disease Sister     Diabetes Sister     COPD Sister     Asthma Sister     Endometriosis Sister     Depression Sister     Cancer Sister         Glioma    Heart disease Sister         MI    Heart attack Sister     Endometriosis Sister     Asthma Sister     Hypertension Sister     Kidney disease Sister         ESRD    COPD Brother     Asthma Brother         Turned into COPD    Diabetes Brother     Asthma Brother     COPD Brother     Diabetes Brother     Hypertension Brother     Asthma Daughter     Endometriosis Daughter     Osteoarthritis Daughter     Hypertension Daughter     Kidney failure Daughter     Irritable bowel syndrome Daughter     Anxiety disorder Daughter     Bipolar disorder Daughter     Depression Daughter     Self-Injurious Behavior  Daughter     Suicide Attempts Daughter     Mental illness Daughter         Bipolar and eating fisorder    Asthma Daughter     Endometriosis Daughter     Osteoarthritis Daughter     Asthma Son     ADD / ADHD Son     Alcohol abuse Son     Drug abuse Son     Breast cancer Maternal Cousin     Heart disease Maternal Grandfather     Heart  disease Maternal Uncle     OCD Neg Hx     Paranoid behavior Neg Hx     Schizophrenia Neg Hx     Seizures Neg Hx     Ovarian cancer Neg Hx      Social History     Socioeconomic History    Marital status:    Tobacco Use    Smoking status: Never     Passive exposure: Never    Smokeless tobacco: Never   Vaping Use    Vaping status: Never Used   Substance and Sexual Activity    Alcohol use: Yes     Comment: ONCE A YEAR    Drug use: Never    Sexual activity: Defer       Current Outpatient Medications:     albuterol sulfate HFA (Ventolin HFA) 108 (90 Base) MCG/ACT inhaler, Inhale 2 puffs Every 4 (Four) to 6 (Six) Hours As Needed for cough, wheeze, and shortness of breath, Disp: 8.5 g, Rfl: 3    allopurinol (ZYLOPRIM) 100 MG tablet, Take 1 tablet by mouth Daily., Disp: 90 tablet, Rfl: 3    betamethasone valerate (VALISONE) 0.1 % cream, Apply topically to the appropriate area as directed Daily., Disp: 45 g, Rfl: 11    Budeson-Glycopyrrol-Formoterol (BREZTRI) 160-9-4.8 MCG/ACT aerosol inhaler, Inhale 2 puffs by mouth 2 (Two) Times a Day. Rinse out mouth after use, Disp: 32.1 g, Rfl: 3    calcium carbonate (Antacid Maximum) 1000 MG chewable tablet, Chew 500 mg 3 (Three) Times a Day., Disp: , Rfl:     cholecalciferol (VITAMIN D3) 25 MCG (1000 UT) tablet, Take 1 tablet by mouth Daily., Disp: , Rfl:     denosumab (PROLIA) 60 MG/ML solution prefilled syringe syringe, Inject 1 mL under the skin into the appropriate area as directed Every 6 (Six) Months., Disp: , Rfl:     dextromethorphan-guaifenesin (MUCINEX DM MAX STRENGTH)  MG per 12 hr tablet, Take 1 tablet by mouth., Disp: , Rfl:     DULoxetine (Cymbalta) 60 MG capsule, Take 1 capsule by mouth Daily., Disp: 90 capsule, Rfl: 3    empagliflozin (JARDIANCE) 10 MG tablet tablet, Take 1 tablet by mouth Daily., Disp: 30 tablet, Rfl: 1    Erenumab-aooe (Aimovig) 140 MG/ML auto-injector, , Disp: , Rfl:     estradiol (ESTRACE) 0.1 MG/GM vaginal cream, Insert 0.5 grams  vaginally twice weekly, Disp: 42.5 g, Rfl: 5    ferrous sulfate 325 (65 FE) MG EC tablet, Take 2 tablets by mouth Daily With Breakfast., Disp: , Rfl:     fexofenadine (ALLEGRA) 180 MG tablet, Take 1 tablet by mouth Daily., Disp: , Rfl:     Fluticasone Propionate (Xhance) 93 MCG/ACT Exhaler Suspension, Administer 1 spray into the nostril(s) as directed by provider 2 (Two) Times a Day., Disp: 16 mL, Rfl: 11    furosemide (Lasix) 40 MG tablet, Take 1 tablet by mouth Daily., Disp: 30 tablet, Rfl: 2    glucosamine-chondroitin 500-400 MG capsule capsule, Take 1 capsule by mouth 2 (Two) Times a Day With Meals., Disp: , Rfl:     guaiFENesin-codeine (ROMILAR-AC) 100-10 MG/5ML solution/syrup, Take 5 mL by mouth 3 (Three) Times a Day As Needed for Cough or Congestion., Disp: 90 mL, Rfl: 0    HYDROcodone-acetaminophen (NORCO) 5-325 MG per tablet, Take 1 tablet by mouth Every 6 (Six) Hours As Needed for Moderate Pain., Disp: 25 tablet, Rfl: 0    Hydrocortisone, Perianal, (Anusol-HC) 2.5 % rectal cream, Apply rectally 2 times daily, Disp: 30 g, Rfl: 2    Hypertonic Nasal Wash (Sinus Rinse Kit) pack, use once or twice daily as needed, Disp: 1 each, Rfl: 3    immune globulin, human, (Gammagard) 30 GM/300ML solution infusion, 50 grams IV every 4 weeks, Disp: 300 mL, Rfl: 6    ipratropium-albuterol (DUO-NEB) 0.5-2.5 mg/3 ml nebulizer, Inhale the contents of 1 vial through a nebulizer every 4-6 hours as needed for cough,wheezing and shortness of breath., Disp: 90 mL, Rfl: 3    isosorbide mononitrate (IMDUR) 30 MG 24 hr tablet, Take 1 tablet by mouth Every Morning., Disp: 90 tablet, Rfl: 0    levothyroxine (SYNTHROID, LEVOTHROID) 50 MCG tablet, Take 1 tablet by mouth Daily., Disp: 90 tablet, Rfl: 3    linaclotide (Linzess) 145 MCG capsule capsule, Take 1 capsule by mouth Every Morning Before Breakfast., Disp: 30 capsule, Rfl: 11    Magnesium 250 MG tablet, Take 2 tablets by mouth 2 (Two) Times a Day., Disp: , Rfl:     Mepolizumab  (Nucala) 100 MG/ML solution auto-injector, Inject 1 mL under the skin into the appropriate area as directed Every 28 (Twenty-Eight) Days. Obtaining medication from [place] [facility] PAP, Disp: , Rfl:     Misc. Devices (Sitz Bath) misc, Use 1 each As Needed (for hemorrhoids)., Disp: 1 each, Rfl: 0    montelukast (SINGULAIR) 10 MG tablet, Take 1 tablet by mouth every night at bedtime., Disp: 90 tablet, Rfl: 3    multivitamin with minerals tablet tablet, Take 1 tablet by mouth Daily., Disp: , Rfl:     omeprazole (priLOSEC) 40 MG capsule, Take 1 capsule by mouth 2 (Two) Times a Day., Disp: 180 capsule, Rfl: 3    ondansetron (ZOFRAN) 4 MG tablet, Take 1 tablet by mouth Every 8 (Eight) Hours As Needed for Nausea or Vomiting., Disp: 30 tablet, Rfl: 11    polyethylene glycol (MIRALAX) 17 GM/SCOOP powder, Take 17 g by mouth Daily., Disp: , Rfl:     potassium citrate (UROCIT-K) 10 MEQ (1080 MG) CR tablet, Take 1 tablet by mouth Daily., Disp: 90 tablet, Rfl: 3    predniSONE (DELTASONE) 10 MG tablet, , Disp: , Rfl:     predniSONE (DELTASONE) 5 MG tablet, Take 6 tablets by mouth Daily With Breakfast for 3 days, THEN 4 tablets Daily With Breakfast for 3 days, THEN 3 tablets Daily With Breakfast for 3 days, THEN 2 tablets Daily With Breakfast for 3 days, THEN 1 tablet Daily With Breakfast for 3 days., Disp: 48 tablet, Rfl: 0    pregabalin (LYRICA) 100 MG capsule, Take 1 capsule by mouth Every 8 (Eight) Hours for 30 days., Disp: 90 capsule, Rfl: 0    tiZANidine (ZANAFLEX) 4 MG tablet, Take 1 tablet by mouth 2 (Two) Times a Day As Needed for Muscle Spasms., Disp: 100 tablet, Rfl: 3    ubrogepant (Ubrelvy) 100 MG tablet, Take 1 tablet by mouth 1 (One) Time As Needed (migraine)., Disp: 5 tablet, Rfl: 0    valsartan (DIOVAN) 160 MG tablet, Take 1 tablet by mouth Daily., Disp: 90 tablet, Rfl: 3    Vibegron (Gemtesa) 75 MG tablet, Take 1 tablet by mouth Daily., Disp: 30 tablet, Rfl: 11    vitamin B-12 (CYANOCOBALAMIN) 1000 MCG tablet,  "Take 1 tablet by mouth Daily., Disp: , Rfl:     Allergies   Allergen Reactions    Cefaclor Hives and Swelling     Other reaction(s): SWELLING  Shed 4 layers of skin and extremely SOB  Cesario Bishop's syndrome        Cephalexin Other (See Comments)     Cesario-Bishop's syndrome      Cephalosporins Hives, Swelling and Angioedema     Rechallenge with cefipime caused rash on 1/14/08.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    Verapamil Nausea And Vomiting     Dizzy    Zolpidem Other (See Comments) and Unknown (See Comments)     Automatic behaviour in sleep.  confusion    Adhesive Tape Itching    Amoxicillin Rash    Edecrin [Ethacrynic Acid] Rash and Other (See Comments)     Weight gain    Hydrochlorothiazide Hives    Sulfa Antibiotics Hives     Objective   Visit Vitals  /84 (BP Location: Left arm, Patient Position: Sitting, Cuff Size: Adult)   Pulse 78   Temp 98.4 °F (36.9 °C) (Temporal)   Ht 165.1 cm (65\")   Wt 100 kg (221 lb)   SpO2 100%   BMI 36.78 kg/m²        Body mass index is 36.78 kg/m².     Physical Exam  Vitals and nursing note reviewed.   Constitutional:       General: She is not in acute distress.     Appearance: Normal appearance. She is obese. She is not ill-appearing.   Pulmonary:      Effort: Pulmonary effort is normal. No respiratory distress.   Skin:     General: Skin is warm and dry.   Neurological:      General: No focal deficit present.      Mental Status: She is alert and oriented to " "person, place, and time.      Gait: Gait abnormal (uses walker).   Psychiatric:         Mood and Affect: Mood normal.         Behavior: Behavior normal.          Labs  Lab Results   Component Value Date    COLORU Yellow 07/04/2025    CLARITYU Clear 07/04/2025    SPECGRAV 1.005 09/19/2023    PHUR 6.5 07/04/2025    LEUKOCYTESUR Small (1+) (A) 07/04/2025    NITRITE Negative (A) 09/19/2023    PROTEINPOCUA Negative 09/19/2023    GLUCOSEUR Negative 09/19/2023    KETONESU Negative 07/04/2025    UROBILINOGEN 0.2 E.U./dL 07/04/2025    BILIRUBINUR Negative 07/04/2025    RBCUR 2+ (A) 09/19/2023      Lab Results   Component Value Date    WBCUA 6-10 (A) 07/04/2025    RBCUA 0-2 07/04/2025    RBCUA 0-2 03/22/2022    BACTERIA Trace (A) 07/04/2025    BACTERIA Comment 03/22/2022    HYALCASTU None Seen 07/04/2025    HYALCASTU None Seen 06/04/2020    SQUAMEPIUA 3-6 (A) 07/04/2025    SQUAMEPIUA 1 to 5 06/04/2020      Urine Culture          7/4/2025    13:27   Urine Culture   Urine Culture >100,000 CFU/mL Klebsiella pneumoniae ssp pneumoniae      Lab Results   Component Value Date    WBC 5.56 07/18/2025    HGB 10.4 (L) 07/18/2025    HCT 30.3 (L) 07/18/2025    MCV 92.4 07/18/2025     07/18/2025     Lab Results   Component Value Date    GLUCOSE 104 (H) 07/18/2025    CALCIUM 8.2 (L) 07/18/2025     (L) 07/18/2025    K 3.4 (L) 07/18/2025    CO2 25.2 07/18/2025    CL 90 (L) 07/18/2025    BUN 12.0 07/18/2025    BUN 14.0 07/18/2025    CREATININE 0.88 07/18/2025    CREATININE 0.99 07/18/2025    EGFR 71.2 07/18/2025    EGFR 61.9 07/18/2025    BCR 13.6 07/18/2025    ANIONGAP 9.8 07/18/2025    ALT 25 07/16/2025    AST 36 (H) 07/16/2025     Lab Results   Component Value Date    HGBA1C 6.30 (H) 03/04/2025     No results found for: \"URICACIDSTN\", \"XMLF4EXKDYD\", \"FTGQ2NAART\", \"LABMAGN\"  Lab Results   Component Value Date    KVVJ69HM 60.3 03/04/2025    URICACID 2.7 08/06/2024     Lab Results   Component Value Date    LABPH 6.0 03/22/2022    " "NAUR 29 07/17/2025       No results found for: \"ATOPOBIUMV\", \"BVAB2\", \"MEGASPHAER\", \"CALBICANSN\", \"CGLABRATAN\", \"CPARAPSILOS\", \"CLUSITANIAE\", \"CKRUSEI\", \"TRICHVAG\", \"CHLAMNAA\", \"NGONORRHON\", \"UREAPLASMA\", \"MYCOPLASMAG\"    Lab Results   Component Value Date    FERRITIN 112 12/11/2018    TSH 1.450 07/05/2025    FREET4 1.33 03/04/2025    RNLEMWPZ39 1,582 (H) 03/04/2025    IOXP47BT 60.3 03/04/2025         Radiographic Studies  XR Chest 1 View  Result Date: 7/17/2025  Stable chest.     Images were reviewed, interpreted, and dictated by Dr. Lisa Castellon MD Transcribed by Curtis Nance PA-C.  This report was signed and finalized on 7/17/2025 8:38 AM by Lisa Castellon MD.      MRI Cervical Spine Without Contrast  Result Date: 7/5/2025  Impression: No acute osseous abnormality. Multilevel degenerative changes are present throughout the spine with multilevel canal stenosis present, most pronounced at C4-C5 and C6-C7. Varying degrees of neuroforaminal narrowing as described above. Electronically Signed: Malika Irby MD  7/5/2025 3:03 AM EDT  Workstation ID: [id number]    MRI Thoracic Spine Without Contrast  Result Date: 7/5/2025  Impression: No acute osseous abnormality. Mild to moderate degenerative changes are present throughout the spine with no evidence of canal stenosis or significant neuroforaminal narrowing at any level. Electronically Signed: Malika Irby MD  7/5/2025 1:40 AM EDT  Workstation ID: [id number]    MRI Lumbar Spine Without Contrast  Result Date: 7/4/2025  Impression: No acute osseous abnormality. Multilevel degenerative changes are present throughout the spine with multilevel canal effacement with no evidence of canal stenosis, similar as compared to the previous recent study from 6/18/2025. Varying degrees of neuroforaminal narrowing as described above. Electronically Signed: Malika Irby MD  7/4/2025 11:30 PM EDT  Workstation ID: [id number]    CT Abdomen Pelvis With Contrast  Result Date: 7/4/2025  Right pleural " thickening and right middle lobe atelectasis may be inflammatory. Short interval follow-up chest CT is recommended.  Moderate amount of stool in the transverse colon is consistent with constipation.  Left nephrolithiasis without hydronephrosis. .   CTDI: 22.43 mGy DLP: 1130.99 mGy.cm   This study was performed with techniques to keep radiation doses as low as reasonably achievable (ALARA). Individualized dose reduction techniques using automated exposure control or adjustment of mA and/or kV according to the patient size were employed.      Images were reviewed, interpreted, and dictated by Dr. Kwaku Grant MD Transcribed by Leyda Rachel PA-C.  This report was signed and finalized on 7/4/2025 2:01 PM by Kwaku Grant MD.      CT Lumbar Spine Without Contrast  Result Date: 7/4/2025  Degenerative disc disease.  No acute fracture.     DLP: 1130.99 mGy.cm CTDI: 22.43 mGy   This study was performed with techniques to keep radiation doses as low as reasonably achievable (ALARA). Individualized dose reduction techniques using automated exposure control or adjustment of mA and/or kV according to the patient size were employed.     Images were reviewed, interpreted, and dictated by Dr. Kwaku Grant MD Transcribed by Leyda Rachel PA-C.  This report was signed and finalized on 7/4/2025 2:01 PM by Kwaku Grant MD.      MRI Lumbar Spine Without Contrast  Result Date: 6/19/2025  Multilevel degenerative changes with canal stenosis and neural foraminal narrowing most pronounced at L2-3, L3-4 and L4-5.   This report was signed and finalized on 6/19/2025 3:28 PM by Alex Perea MD.      XR Chest 1 View  Result Date: 6/1/2025  IMPRESSION: 1. Cardiomegaly and mild prominence of the pulmonary vascularity. 2. An area of atelectasis is noted in the lateral aspect of the right mid to lower lung. 3. Pleural thickening or a tiny right-sided pleural effusion is questioned. Authenticated and Electronically  Signed by Ever Bruce MD on 06/01/2025 12:49:49 AM    XR Spine Lumbar 2 or 3 View  Result Date: 5/2/2025  No acute bony process.  Osteopenia and degenerative change similar to prior.       This report was signed and finalized on 5/2/2025 2:45 PM by Lisa Castellon MD.        I have reviewed the above labs and imaging.     Assessment / Plan      Diagnoses and all orders for this visit:    1. Urge incontinence of urine (Primary)  -     Vibegron (Gemtesa) 75 MG tablet; Take 1 tablet by mouth Daily.  Dispense: 30 tablet; Refill: 11    2. JENNIE (stress urinary incontinence), male         Assessment & Plan  1. Overactive bladder  - Symptoms improved with InterStim therapy, particularly leakage, but urination frequency remains unchanged  - Lasix complicates bladder control  - Vesicare discontinued due to side effects  - Initiate Gemtesa trial for 3 months to assess effectiveness, with samples provided    - Risks: potential side effects discussed   - Conduct PVR test at next visit     Follow-up  - Scheduled for 3 months  Return in about 3 months (around 10/23/2025) for Joel.    Joel Griffin, MSN, APRN, FNP-C  [facility]

## 2025-07-23 NOTE — TELEPHONE ENCOUNTER
"SW PATIENT.  SHE WILL CALL US BACK TO SCHEDULE APPT ONCE SHE GETS HOME.     Relay     \"PATIENT CAN BE SCHEDULED WITH ANY NEXT AVAILABLE WITH DR. MERRITT/TK DORADO OR MILTON CLEARY. \"                  "

## 2025-07-24 ENCOUNTER — HOSPITAL ENCOUNTER (OUTPATIENT)
Facility: HOSPITAL | Age: 70
Discharge: HOME | End: 2025-07-24
Admitting: ALLERGY & IMMUNOLOGY
Payer: MEDICARE

## 2025-07-24 VITALS
RESPIRATION RATE: 16 BRPM | SYSTOLIC BLOOD PRESSURE: 120 MMHG | DIASTOLIC BLOOD PRESSURE: 78 MMHG | OXYGEN SATURATION: 96 % | HEART RATE: 87 BPM | TEMPERATURE: 98.2 F

## 2025-07-24 DIAGNOSIS — D80.1 COMMON VARIABLE AGAMMAGLOBULINEMIA: Primary | ICD-10-CM

## 2025-07-24 DIAGNOSIS — Z95.828 PORT-A-CATH IN PLACE: ICD-10-CM

## 2025-07-24 PROCEDURE — 25010000002 HEPARIN LOCK FLUSH PER 10 UNITS: Performed by: ALLERGY & IMMUNOLOGY

## 2025-07-24 PROCEDURE — 25010000002 IMMUNE GLOBULIN (HUMAN) 20 GM/200ML SOLUTION: Performed by: ALLERGY & IMMUNOLOGY

## 2025-07-24 PROCEDURE — 96365 THER/PROPH/DIAG IV INF INIT: CPT

## 2025-07-24 PROCEDURE — 96366 THER/PROPH/DIAG IV INF ADDON: CPT

## 2025-07-24 PROCEDURE — 25810000003 SODIUM CHLORIDE 0.9 % SOLUTION: Performed by: ALLERGY & IMMUNOLOGY

## 2025-07-24 PROCEDURE — 25010000002 IMMUNE GLOBULIN (HUMAN) 10 GM/100ML SOLUTION: Performed by: ALLERGY & IMMUNOLOGY

## 2025-07-24 RX ORDER — EPINEPHRINE 1 MG/ML
0.3 INJECTION, SOLUTION INTRAMUSCULAR; SUBCUTANEOUS ONCE AS NEEDED
Start: 2025-08-21

## 2025-07-24 RX ORDER — ACETAMINOPHEN 325 MG/1
325 TABLET ORAL ONCE
Status: DISCONTINUED | OUTPATIENT
Start: 2025-07-24 | End: 2025-07-25 | Stop reason: HOSPADM

## 2025-07-24 RX ORDER — SODIUM CHLORIDE 0.9 % (FLUSH) 0.9 %
10 SYRINGE (ML) INJECTION AS NEEDED
Status: DISCONTINUED | OUTPATIENT
Start: 2025-07-24 | End: 2025-07-25 | Stop reason: HOSPADM

## 2025-07-24 RX ORDER — SODIUM CHLORIDE 0.9 % (FLUSH) 0.9 %
10 SYRINGE (ML) INJECTION AS NEEDED
OUTPATIENT
Start: 2025-07-24

## 2025-07-24 RX ORDER — DIPHENHYDRAMINE HCL 25 MG
50 CAPSULE ORAL ONCE AS NEEDED
Start: 2025-08-21

## 2025-07-24 RX ORDER — PREDNISONE 20 MG/1
40 TABLET ORAL ONCE
Status: DISCONTINUED | OUTPATIENT
Start: 2025-07-24 | End: 2025-07-25 | Stop reason: HOSPADM

## 2025-07-24 RX ORDER — HEPARIN SODIUM (PORCINE) LOCK FLUSH IV SOLN 100 UNIT/ML 100 UNIT/ML
SOLUTION INTRAVENOUS
Status: DISCONTINUED
Start: 2025-07-24 | End: 2025-07-25 | Stop reason: HOSPADM

## 2025-07-24 RX ORDER — METHYLPREDNISOLONE SODIUM SUCCINATE 125 MG/2ML
50 INJECTION, POWDER, LYOPHILIZED, FOR SOLUTION INTRAMUSCULAR; INTRAVENOUS ONCE AS NEEDED
Start: 2025-08-21

## 2025-07-24 RX ORDER — PREDNISONE 20 MG/1
40 TABLET ORAL ONCE
Start: 2025-08-21 | End: 2025-08-21

## 2025-07-24 RX ORDER — ACETAMINOPHEN 325 MG/1
325 TABLET ORAL ONCE
OUTPATIENT
Start: 2025-08-21

## 2025-07-24 RX ORDER — DIPHENHYDRAMINE HCL 25 MG
50 CAPSULE ORAL ONCE
Status: DISCONTINUED | OUTPATIENT
Start: 2025-07-24 | End: 2025-07-25 | Stop reason: HOSPADM

## 2025-07-24 RX ORDER — DIPHENHYDRAMINE HCL 25 MG
50 CAPSULE ORAL ONCE
Start: 2025-08-21 | End: 2025-08-21

## 2025-07-24 RX ORDER — HEPARIN SODIUM (PORCINE) LOCK FLUSH IV SOLN 100 UNIT/ML 100 UNIT/ML
500 SOLUTION INTRAVENOUS AS NEEDED
OUTPATIENT
Start: 2025-07-24

## 2025-07-24 RX ORDER — HEPARIN SODIUM (PORCINE) LOCK FLUSH IV SOLN 100 UNIT/ML 100 UNIT/ML
500 SOLUTION INTRAVENOUS AS NEEDED
Status: DISCONTINUED | OUTPATIENT
Start: 2025-07-24 | End: 2025-07-25 | Stop reason: HOSPADM

## 2025-07-24 RX ORDER — PREDNISONE 20 MG/1
40 TABLET ORAL ONCE AS NEEDED
Start: 2025-08-21

## 2025-07-24 RX ADMIN — Medication 500 UNITS: at 13:06

## 2025-07-24 RX ADMIN — Medication 10 ML: at 13:06

## 2025-07-24 RX ADMIN — IMMUNE GLOBULIN INFUSION (HUMAN) 20 G: 100 INJECTION, SOLUTION INTRAVENOUS; SUBCUTANEOUS at 10:28

## 2025-07-24 RX ADMIN — IMMUNE GLOBULIN INFUSION (HUMAN) 10 G: 100 INJECTION, SOLUTION INTRAVENOUS; SUBCUTANEOUS at 09:10

## 2025-07-24 RX ADMIN — SODIUM CHLORIDE 250 ML: 9 INJECTION, SOLUTION INTRAVENOUS at 08:44

## 2025-07-24 RX ADMIN — IMMUNE GLOBULIN INFUSION (HUMAN) 20 G: 100 INJECTION, SOLUTION INTRAVENOUS; SUBCUTANEOUS at 11:36

## 2025-07-24 NOTE — CODE DOCUMENTATION
Pt taken pre tx meds at home. Pt requested to have 250 mL of 500 mL NS bolus due to her concern with HF.

## 2025-07-31 DIAGNOSIS — N95.8 GENITOURINARY SYNDROME OF MENOPAUSE: ICD-10-CM

## 2025-07-31 RX ORDER — ESTRADIOL 0.1 MG/G
CREAM VAGINAL
Qty: 42.5 G | Refills: 5 | Status: SHIPPED | OUTPATIENT
Start: 2025-07-31

## 2025-08-01 ENCOUNTER — LAB (OUTPATIENT)
Dept: LAB | Facility: HOSPITAL | Age: 70
End: 2025-08-01
Payer: MEDICARE

## 2025-08-01 ENCOUNTER — TRANSCRIBE ORDERS (OUTPATIENT)
Dept: LAB | Facility: HOSPITAL | Age: 70
End: 2025-08-01
Payer: MEDICARE

## 2025-08-01 ENCOUNTER — OFFICE VISIT (OUTPATIENT)
Dept: NEUROSURGERY | Facility: CLINIC | Age: 70
End: 2025-08-01
Payer: MEDICARE

## 2025-08-01 VITALS — WEIGHT: 201 LBS | BODY MASS INDEX: 33.49 KG/M2 | TEMPERATURE: 98.4 F | HEIGHT: 65 IN

## 2025-08-01 DIAGNOSIS — M54.16 LUMBAR RADICULOPATHY: Primary | ICD-10-CM

## 2025-08-01 DIAGNOSIS — N28.9 ABNORMAL RENAL FUNCTION: Primary | ICD-10-CM

## 2025-08-01 DIAGNOSIS — N28.9 ABNORMAL RENAL FUNCTION: ICD-10-CM

## 2025-08-01 DIAGNOSIS — M48.061 SPINAL STENOSIS OF LUMBAR REGION WITHOUT NEUROGENIC CLAUDICATION: ICD-10-CM

## 2025-08-01 LAB
25(OH)D3 SERPL-MCNC: 60.4 NG/ML (ref 30–100)
ALBUMIN SERPL-MCNC: 4 G/DL (ref 3.5–5.2)
ALBUMIN UR-MCNC: <1.2 MG/DL
ALBUMIN/GLOB SERPL: 1.1 G/DL
ALP SERPL-CCNC: 62 U/L (ref 39–117)
ALT SERPL W P-5'-P-CCNC: 25 U/L (ref 1–33)
ANION GAP SERPL CALCULATED.3IONS-SCNC: 10.9 MMOL/L (ref 5–15)
AST SERPL-CCNC: 39 U/L (ref 1–32)
BACTERIA UR QL AUTO: ABNORMAL /HPF
BASOPHILS # BLD AUTO: 0.05 10*3/MM3 (ref 0–0.2)
BASOPHILS NFR BLD AUTO: 0.7 % (ref 0–1.5)
BILIRUB SERPL-MCNC: 0.4 MG/DL (ref 0–1.2)
BILIRUB UR QL STRIP: NEGATIVE
BUN SERPL-MCNC: 15 MG/DL (ref 8–23)
BUN/CREAT SERPL: 13.2 (ref 7–25)
CALCIUM SPEC-SCNC: 8.9 MG/DL (ref 8.6–10.5)
CHLORIDE SERPL-SCNC: 100 MMOL/L (ref 98–107)
CHOLEST SERPL-MCNC: 195 MG/DL (ref 0–200)
CLARITY UR: CLEAR
CO2 SERPL-SCNC: 24.1 MMOL/L (ref 22–29)
COLOR UR: YELLOW
CREAT SERPL-MCNC: 1.14 MG/DL (ref 0.57–1)
CREAT UR-MCNC: 32.2 MG/DL
DEPRECATED RDW RBC AUTO: 43.4 FL (ref 37–54)
EGFRCR SERPLBLD CKD-EPI 2021: 52.2 ML/MIN/1.73
EOSINOPHIL # BLD AUTO: 0.01 10*3/MM3 (ref 0–0.4)
EOSINOPHIL NFR BLD AUTO: 0.1 % (ref 0.3–6.2)
ERYTHROCYTE [DISTWIDTH] IN BLOOD BY AUTOMATED COUNT: 12.2 % (ref 12.3–15.4)
GLOBULIN UR ELPH-MCNC: 3.6 GM/DL
GLUCOSE SERPL-MCNC: 119 MG/DL (ref 65–99)
GLUCOSE UR STRIP-MCNC: ABNORMAL MG/DL
HBA1C MFR BLD: 6.6 % (ref 4.8–5.6)
HCT VFR BLD AUTO: 37.5 % (ref 34–46.6)
HDLC SERPL-MCNC: 101 MG/DL (ref 40–60)
HGB BLD-MCNC: 12.4 G/DL (ref 12–15.9)
HGB UR QL STRIP.AUTO: NEGATIVE
HYALINE CASTS UR QL AUTO: ABNORMAL /LPF
IMM GRANULOCYTES # BLD AUTO: 0.07 10*3/MM3 (ref 0–0.05)
IMM GRANULOCYTES NFR BLD AUTO: 0.9 % (ref 0–0.5)
KETONES UR QL STRIP: NEGATIVE
LDLC SERPL CALC-MCNC: 80 MG/DL (ref 0–100)
LDLC/HDLC SERPL: 0.77 {RATIO}
LEUKOCYTE ESTERASE UR QL STRIP.AUTO: ABNORMAL
LYMPHOCYTES # BLD AUTO: 0.95 10*3/MM3 (ref 0.7–3.1)
LYMPHOCYTES NFR BLD AUTO: 12.5 % (ref 19.6–45.3)
MAGNESIUM SERPL-MCNC: 2.2 MG/DL (ref 1.6–2.4)
MCH RBC QN AUTO: 32.1 PG (ref 26.6–33)
MCHC RBC AUTO-ENTMCNC: 33.1 G/DL (ref 31.5–35.7)
MCV RBC AUTO: 97.2 FL (ref 79–97)
MICROALBUMIN/CREAT UR: NORMAL MG/G{CREAT}
MONOCYTES # BLD AUTO: 0.47 10*3/MM3 (ref 0.1–0.9)
MONOCYTES NFR BLD AUTO: 6.2 % (ref 5–12)
NEUTROPHILS NFR BLD AUTO: 6.07 10*3/MM3 (ref 1.7–7)
NEUTROPHILS NFR BLD AUTO: 79.6 % (ref 42.7–76)
NITRITE UR QL STRIP: NEGATIVE
NRBC BLD AUTO-RTO: 0 /100 WBC (ref 0–0.2)
PH UR STRIP.AUTO: 6.5 [PH] (ref 5–8)
PHOSPHATE SERPL-MCNC: 4 MG/DL (ref 2.5–4.5)
PLATELET # BLD AUTO: 290 10*3/MM3 (ref 140–450)
PMV BLD AUTO: 9.4 FL (ref 6–12)
POTASSIUM SERPL-SCNC: 4.6 MMOL/L (ref 3.5–5.2)
PROT ?TM UR-MCNC: 8 MG/DL
PROT SERPL-MCNC: 7.6 G/DL (ref 6–8.5)
PROT UR QL STRIP: NEGATIVE
RBC # BLD AUTO: 3.86 10*6/MM3 (ref 3.77–5.28)
RBC # UR STRIP: ABNORMAL /HPF
REF LAB TEST METHOD: ABNORMAL
SODIUM SERPL-SCNC: 135 MMOL/L (ref 136–145)
SP GR UR STRIP: 1.02 (ref 1–1.03)
SQUAMOUS #/AREA URNS HPF: ABNORMAL /HPF
TRIGL SERPL-MCNC: 80 MG/DL (ref 0–150)
UROBILINOGEN UR QL STRIP: ABNORMAL
VLDLC SERPL-MCNC: 14 MG/DL (ref 5–40)
WBC # UR STRIP: ABNORMAL /HPF
WBC NRBC COR # BLD AUTO: 7.62 10*3/MM3 (ref 3.4–10.8)

## 2025-08-01 PROCEDURE — 85025 COMPLETE CBC W/AUTO DIFF WBC: CPT | Performed by: INTERNAL MEDICINE

## 2025-08-01 PROCEDURE — 82306 VITAMIN D 25 HYDROXY: CPT | Performed by: INTERNAL MEDICINE

## 2025-08-01 PROCEDURE — 80053 COMPREHEN METABOLIC PANEL: CPT | Performed by: INTERNAL MEDICINE

## 2025-08-01 PROCEDURE — 83735 ASSAY OF MAGNESIUM: CPT | Performed by: INTERNAL MEDICINE

## 2025-08-01 PROCEDURE — 84156 ASSAY OF PROTEIN URINE: CPT

## 2025-08-01 PROCEDURE — 80061 LIPID PANEL: CPT | Performed by: INTERNAL MEDICINE

## 2025-08-01 PROCEDURE — 82043 UR ALBUMIN QUANTITATIVE: CPT | Performed by: INTERNAL MEDICINE

## 2025-08-01 PROCEDURE — 84100 ASSAY OF PHOSPHORUS: CPT | Performed by: INTERNAL MEDICINE

## 2025-08-01 PROCEDURE — 81001 URINALYSIS AUTO W/SCOPE: CPT

## 2025-08-01 PROCEDURE — 82570 ASSAY OF URINE CREATININE: CPT | Performed by: INTERNAL MEDICINE

## 2025-08-01 PROCEDURE — 83036 HEMOGLOBIN GLYCOSYLATED A1C: CPT | Performed by: INTERNAL MEDICINE

## 2025-08-01 RX ORDER — HYDROCODONE BITARTRATE AND ACETAMINOPHEN 5; 325 MG/1; MG/1
1 TABLET ORAL EVERY 8 HOURS PRN
Qty: 25 TABLET | Refills: 0 | Status: SHIPPED | OUTPATIENT
Start: 2025-08-01

## 2025-08-01 NOTE — PROGRESS NOTES
Name: Raquel Mariee    : 1955     MRN: [id number]     Primary Care Provider: Sanjeev Rawls MD    Chief Complaint  Follow-up, BLE/foot numbness, Numbness, and Leg Pain      History of Present Illness:  Raquel Mariee is a 69 y.o. female who was recently hospitalized earlier last month for worsening low back pain with radiation into the right lower extremity.  During her hospitalization I started her on Lyrica and recommended conservative treatments with physical therapy and pain management for her radicular pain.  MRI of the lumbar spine revealed an L2-3 disc herniation eccentric to the right contributing to an L3 radiculopathy.  She is currently seeing [facility] and underwent epidural steroid injection about 2 weeks ago with mild relief of her right lower extremity pain.  During my visit today, she continues to endorse constant low back pain with radiation into the right lower extremity and now she is also having some episodic left leg pain as well.  Her left leg pain goes into the lateral side of her thigh and down into her knee.  She also reports some numbness/tingling into both of her feet.  She is still having some difficulties with ambulation secondary to her right leg pain.  She denies any saddle anesthesia, bladder or bowel incontinence, or new weakness in the bilateral lower extremities.  She is currently taking Lyrica 100 mg 3 times daily and Norco with moderate relief of her pain.    The following portions of the patient's history were reviewed and updated as appropriate and include current medications, allergies, allergies, past family history, past medical history, past social history, past surgical history and problem list.     PMHX  Allergies:  Allergies   Allergen Reactions    Cefaclor Hives and Swelling     Other reaction(s): SWELLING  Shed 4 layers of skin and extremely SOB  Cesario Bishop's syndrome        Cephalexin Other (See Comments)     Arun's  syndrome      Cephalosporins Angioedema, Hives, Swelling and Other (See Comments)     Rechallenge with cefipime caused rash on 1/14/08.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    Verapamil Nausea And Vomiting     Dizzy    Zolpidem Other (See Comments) and Unknown (See Comments)     Automatic behaviour in sleep.  confusion    Adhesive Tape Itching    Amoxicillin Rash    Edecrin [Ethacrynic Acid] Rash and Other (See Comments)     Weight gain    Hydrochlorothiazide Hives    Sulfa Antibiotics Hives     Medications    Current Outpatient Medications:     albuterol sulfate HFA (Ventolin HFA) 108 (90 Base) MCG/ACT inhaler, Inhale 2 puffs Every 4 (Four) to 6 (Six) Hours As Needed for cough, wheeze, and shortness of breath, Disp: 8.5 g, Rfl: 3    allopurinol (ZYLOPRIM) 100 MG tablet, Take 1 tablet by mouth Daily., Disp: 90 tablet, Rfl: 3    betamethasone valerate (VALISONE) 0.1 % cream, Apply topically to the appropriate area as directed Daily., Disp: 45 g, Rfl: 11    Budeson-Glycopyrrol-Formoterol (BREZTRI) 160-9-4.8 MCG/ACT aerosol inhaler, Inhale 2 puffs by mouth 2 (Two) Times a Day. Rinse out mouth after use, Disp: 32.1 g, Rfl: 3    calcium carbonate (Antacid Maximum) 1000 MG chewable tablet, Chew 500 mg 3 (Three) Times a Day., Disp: , Rfl:     cholecalciferol (VITAMIN D3) 25 MCG (1000 UT) tablet, Take 1 tablet by mouth Daily., Disp: , Rfl:     denosumab (PROLIA) 60 MG/ML solution prefilled syringe syringe, Inject 1 mL  under the skin into the appropriate area as directed Every 6 (Six) Months., Disp: , Rfl:     dextromethorphan-guaifenesin (MUCINEX DM MAX STRENGTH)  MG per 12 hr tablet, Take 1 tablet by mouth., Disp: , Rfl:     DULoxetine (Cymbalta) 60 MG capsule, Take 1 capsule by mouth Daily., Disp: 90 capsule, Rfl: 3    empagliflozin (JARDIANCE) 10 MG tablet tablet, Take 1 tablet by mouth Daily., Disp: 30 tablet, Rfl: 1    Erenumab-aooe (Aimovig) 140 MG/ML auto-injector, , Disp: , Rfl:     estradiol (ESTRACE) 0.1 MG/GM vaginal cream, Insert 0.5 grams vaginally twice weekly, Disp: 42.5 g, Rfl: 5    ferrous sulfate 325 (65 FE) MG EC tablet, Take 2 tablets by mouth Daily With Breakfast., Disp: , Rfl:     fexofenadine (ALLEGRA) 180 MG tablet, Take 1 tablet by mouth Daily., Disp: , Rfl:     Fluticasone Propionate (Xhance) 93 MCG/ACT Exhaler Suspension, Administer 1 spray into the nostril(s) as directed by provider 2 (Two) Times a Day., Disp: 16 mL, Rfl: 11    furosemide (Lasix) 40 MG tablet, Take 1 tablet by mouth Daily., Disp: 30 tablet, Rfl: 2    glucosamine-chondroitin 500-400 MG capsule capsule, Take 1 capsule by mouth 2 (Two) Times a Day With Meals., Disp: , Rfl:     guaiFENesin-codeine (ROMILAR-AC) 100-10 MG/5ML solution/syrup, Take 5 mL by mouth 3 (Three) Times a Day As Needed for Cough or Congestion., Disp: 90 mL, Rfl: 0    Hydrocortisone, Perianal, (Anusol-HC) 2.5 % rectal cream, Apply rectally 2 times daily, Disp: 30 g, Rfl: 2    Hypertonic Nasal Wash (Sinus Rinse Kit) pack, use once or twice daily as needed, Disp: 1 each, Rfl: 3    immune globulin, human, (Gammagard) 30 GM/300ML solution infusion, 50 grams IV every 4 weeks, Disp: 300 mL, Rfl: 6    ipratropium-albuterol (DUO-NEB) 0.5-2.5 mg/3 ml nebulizer, Inhale the contents of 1 vial through a nebulizer every 4-6 hours as needed for cough,wheezing and shortness of breath., Disp: 90 mL, Rfl: 3    isosorbide mononitrate (IMDUR) 30 MG 24 hr tablet, Take 1 tablet by  mouth Every Morning., Disp: 90 tablet, Rfl: 0    levothyroxine (SYNTHROID, LEVOTHROID) 50 MCG tablet, Take 1 tablet by mouth Daily., Disp: 90 tablet, Rfl: 3    linaclotide (Linzess) 145 MCG capsule capsule, Take 1 capsule by mouth Every Morning Before Breakfast., Disp: 30 capsule, Rfl: 11    Magnesium 250 MG tablet, Take 2 tablets by mouth 2 (Two) Times a Day., Disp: , Rfl:     Mepolizumab (Nucala) 100 MG/ML solution auto-injector, Inject 1 mL under the skin into the appropriate area as directed Every 28 (Twenty-Eight) Days. Obtaining medication from [facility], Disp: , Rfl:     Misc. Devices (Sitz Bath) misc, Use 1 each As Needed (for hemorrhoids)., Disp: 1 each, Rfl: 0    montelukast (SINGULAIR) 10 MG tablet, Take 1 tablet by mouth every night at bedtime., Disp: 90 tablet, Rfl: 3    multivitamin with minerals tablet tablet, Take 1 tablet by mouth Daily., Disp: , Rfl:     omeprazole (priLOSEC) 40 MG capsule, Take 1 capsule by mouth 2 (Two) Times a Day., Disp: 180 capsule, Rfl: 3    ondansetron (ZOFRAN) 4 MG tablet, Take 1 tablet by mouth Every 8 (Eight) Hours As Needed for Nausea or Vomiting., Disp: 30 tablet, Rfl: 11    polyethylene glycol (MIRALAX) 17 GM/SCOOP powder, Take 17 g by mouth Daily., Disp: , Rfl:     potassium citrate (UROCIT-K) 10 MEQ (1080 MG) CR tablet, Take 1 tablet by mouth Daily., Disp: 90 tablet, Rfl: 3    predniSONE (DELTASONE) 10 MG tablet, , Disp: , Rfl:     predniSONE (DELTASONE) 5 MG tablet, Take 6 tablets by mouth Daily With Breakfast for 3 days, THEN 4 tablets Daily With Breakfast for 3 days, THEN 3 tablets Daily With Breakfast for 3 days, THEN 2 tablets Daily With Breakfast for 3 days, THEN 1 tablet Daily With Breakfast for 3 days., Disp: 48 tablet, Rfl: 0    pregabalin (LYRICA) 100 MG capsule, Take 1 capsule by mouth Every 8 (Eight) Hours for 30 days., Disp: 90 capsule, Rfl: 0    tiZANidine (ZANAFLEX) 4 MG tablet, Take 1 tablet by mouth 2 (Two) Times a Day As Needed for  Muscle Spasms., Disp: 100 tablet, Rfl: 3    ubrogepant (Ubrelvy) 100 MG tablet, Take 1 tablet by mouth 1 (One) Time As Needed (migraine)., Disp: 5 tablet, Rfl: 0    valsartan (DIOVAN) 160 MG tablet, Take 1 tablet by mouth Daily., Disp: 90 tablet, Rfl: 3    Vibegron (Gemtesa) 75 MG tablet, Take 1 tablet by mouth Daily., Disp: 30 tablet, Rfl: 11    vitamin B-12 (CYANOCOBALAMIN) 1000 MCG tablet, Take 1 tablet by mouth Daily., Disp: , Rfl:     HYDROcodone-acetaminophen (NORCO) 5-325 MG per tablet, Take 1 tablet by mouth Every 8 (Eight) Hours As Needed for Moderate Pain., Disp: 25 tablet, Rfl: 0  Past Medical History:  Past Medical History:   Diagnosis Date    Anxiety     Aortic valve stenosis     Cardiac murmur     Cataract, bilateral     CHF (congestive heart failure)     Cholelithiasis GB out    Chronic kidney disease     Stage III    Clostridium difficile infection     in her 30s    Colon polyp Ever since having colonoscopies.    Common variable immunodeficiency     IVIG infusions    Compression fracture of lumbar spine, non-traumatic     COPD (chronic obstructive pulmonary disease)     Coronary artery disease     Prinz metal angina & aortic stenosis    Depression     Diabetes mellitus     type II    Eosinophilic asthma     Fibromyalgia, primary     Full dentures     GERD (gastroesophageal reflux disease)     History of transfusion     in 1979 at [facility].  No reaction noted, patient states she does have an antibody from transfusion.    Hypertension     Hypogammaglobulinemia     Hypothyroidism     Irritable bowel syndrome     Migraines     Morbid obesity     Optic neuritis     Optic neuropathy     Osteoarthritis     Prinzmetal angina 1990    Pseudomonas infection 1998    lungs    PTSD (post-traumatic stress disorder)     Pulmonary edema     Renal insufficiency     Sinus tachycardia 1990    Sleep apnea     no longer uses/needs cpap    Stroke     TIA about 7 years ago    Tachycardia     TIA (transient  ischemic attack) 2017    Trochanteric bursitis 01/25/2023     Past Surgical History:  Past Surgical History:   Procedure Laterality Date    APPENDECTOMY      BUNIONECTOMY Bilateral     CARDIAC CATHETERIZATION  2017    no stents needed    CARPAL TUNNEL RELEASE Right     COLONOSCOPY  2021    ENDOMETRIAL ABLATION  1997    EYE SURGERY  ? About 6-8 years ago    Laser for glaucoma    FRACTURE SURGERY  1995    No hardware; Tibeal plateau    GASTRIC BYPASS      HAND SURGERY Left     No hardware    HEMORRHOIDECTOMY N/A 04/09/2024    Procedure: HEMORRHOID BANDING X2;  Surgeon: Melissa Beck MD;  Location: [facility] OR;  Service: General;  Laterality: N/A;    INTERSTIM PLACEMENT N/A 05/03/2023    Procedure: INTERSTIM STAGES 1 AND 2 LEAD AND GENERATOR PLACEMENT;  Surgeon: Abner Nation MD;  Location: [facility] OR;  Service: Urology;  Laterality: N/A;    POSTERIOR VAGINAL REPAIR N/A 08/02/2023    Procedure: POSTERIOR COLPORRHAPHY;  Surgeon: Kellen Galan MD;  Location:  [facility] OR;  Service: Gynecology;  Laterality: N/A;    RECTAL EXAMINATION UNDER ANESTHESIA N/A 04/09/2024    Procedure: RECTAL EXAM UNDER ANESTHESIA;  Surgeon: Melissa Beck MD;  Location: [facility] OR;  Service: General;  Laterality: N/A;    REPLACEMENT TOTAL KNEE BILATERAL      TEETH EXTRACTION      all of bottom teeth removed    THORACOTOMY      TONSILLECTOMY      TOTAL ABDOMINAL HYSTERECTOMY WITH SALPINGO OOPHORECTOMY      TRACHEAL SURGERY      Repaired via thoracotomy    TUBAL ABDOMINAL LIGATION  1983    VENOUS ACCESS DEVICE (PORT) INSERTION      port a cath in the left chest wall     Social Hx:  Social History     Tobacco Use    Smoking status: Never     Passive exposure: Never    Smokeless tobacco: Never   Vaping Use    Vaping status: Never Used   Substance Use Topics    Alcohol use: Yes     Comment: ONCE A YEAR    Drug use: Never     Family Hx:  Family History   Problem Relation Age of Onset    Diabetes Mother     Stroke Mother     Heart  disease Mother     Hypertension Mother     Endometriosis Mother     Depression Mother     Diabetes Father     Hypertension Father     Stroke Father     Heart attack Father     Asthma Father     COPD Father     Dementia Father     Heart disease Father     COPD Sister     Asthma Sister     Cancer Sister         Nasal cell skin    Brain cancer Sister     Diabetes Sister     Stroke Sister     Heart attack Sister     Endometriosis Sister     Depression Sister     Arthritis Sister         Rheumatoid    Hypertension Sister     Kidney disease Sister     Diabetes Sister     COPD Sister     Asthma Sister     Endometriosis Sister     Depression Sister     Cancer Sister         Glioma    Heart disease Sister         MI    Heart attack Sister     Endometriosis Sister     Asthma Sister     Hypertension Sister     Kidney disease Sister         ESRD    COPD Brother     Asthma Brother         Turned into COPD    Diabetes Brother     Asthma Brother     COPD Brother     Diabetes Brother     Hypertension Brother     Asthma Daughter     Endometriosis Daughter     Osteoarthritis Daughter     Hypertension Daughter     Kidney failure Daughter     Irritable bowel syndrome Daughter     Anxiety disorder Daughter     Bipolar disorder Daughter     Depression Daughter     Self-Injurious Behavior  Daughter     Suicide Attempts Daughter     Mental illness Daughter         Bipolar and eating fisorder    Asthma Daughter     Endometriosis Daughter     Osteoarthritis Daughter     Asthma Son     ADD / ADHD Son     Alcohol abuse Son     Drug abuse Son     Breast cancer Maternal Cousin     Heart disease Maternal Grandfather     Heart disease Maternal Uncle     OCD Neg Hx     Paranoid behavior Neg Hx     Schizophrenia Neg Hx     Seizures Neg Hx     Ovarian cancer Neg Hx      Review of Systems:        Review of Systems   Constitutional:  Negative for activity change, appetite change, chills, diaphoresis, fatigue, fever and unexpected weight change.   HENT:   Negative for congestion, dental problem, drooling, ear discharge, ear pain, facial swelling, hearing loss, mouth sores, nosebleeds, postnasal drip, rhinorrhea, sinus pressure, sinus pain, sneezing, sore throat, tinnitus, trouble swallowing and voice change.    Eyes:  Negative for photophobia, pain, discharge, redness, itching and visual disturbance.   Respiratory:  Negative for apnea, cough, choking, chest tightness, shortness of breath, wheezing and stridor.    Cardiovascular:  Negative for chest pain, palpitations and leg swelling.   Gastrointestinal:  Negative for abdominal distention, abdominal pain, anal bleeding, blood in stool, constipation, diarrhea, nausea, rectal pain and vomiting.   Endocrine: Negative for cold intolerance, heat intolerance, polydipsia, polyphagia and polyuria.   Genitourinary:  Negative for decreased urine volume, difficulty urinating, dyspareunia, dysuria, enuresis, flank pain, frequency, genital sores, hematuria, menstrual problem, pelvic pain, urgency, vaginal bleeding, vaginal discharge and vaginal pain.   Musculoskeletal:  Positive for back pain and gait problem. Negative for arthralgias, joint swelling, myalgias, neck pain and neck stiffness.   Skin:  Negative for color change, pallor, rash and wound.   Allergic/Immunologic: Negative for environmental allergies, food allergies and immunocompromised state.   Neurological:  Positive for weakness and numbness. Negative for dizziness, tremors, seizures, syncope, facial asymmetry, speech difficulty, light-headedness and headaches.   Hematological:  Negative for adenopathy. Does not bruise/bleed easily.   Psychiatric/Behavioral:  Negative for agitation, behavioral problems, confusion, decreased concentration, dysphoric mood, hallucinations, self-injury, sleep disturbance and suicidal ideas. The patient is not nervous/anxious and is not hyperactive.       The remaining review of systems is negative as otherwise stated in the  "HPI.    Vital Signs: Temp 98.4 °F (36.9 °C) (Infrared)   Ht 165.1 cm (65\")   Wt 91.2 kg (201 lb)   BMI 33.45 kg/m²        Physical Exam  Vitals and nursing note reviewed.   Constitutional:       Appearance: Normal appearance. She is obese.   HENT:      Head: Normocephalic and atraumatic.   Musculoskeletal:      Right lower leg: No edema.      Left lower leg: No edema.        Legs:       Comments: Patient had 5 out of 5 strength with hip flexion, plantar and dorsiflexion bilaterally.  There is tenderness to palpation in the surrounding lumbar paraspinous musculature, worse on the right.  Red represents pattern of patient reported radiating pain in the right lower extremity.     Neurological:      Mental Status: She is alert.      Cranial Nerves: No cranial nerve deficit or facial asymmetry.      Sensory: Sensation is intact. No sensory deficit.      Motor: No weakness, tremor, atrophy, abnormal muscle tone or seizure activity.      Gait: Gait abnormal.      Deep Tendon Reflexes:      Reflex Scores:       Tricep reflexes are 1+ on the right side and 1+ on the left side.       Bicep reflexes are 1+ on the right side and 1+ on the left side.       Brachioradialis reflexes are 1+ on the right side and 1+ on the left side.       Patellar reflexes are 1+ on the right side and 1+ on the left side.       Achilles reflexes are 1+ on the right side and 1+ on the left side.     Comments: Slump test positive on the right was negative bilaterally.    Intact vibratory and temperature sensation bilaterally.  No signs of ankle clonus bilaterally.  Gait testing deferred secondary to right leg pain.  She does ambulate with the assistance of a walker for long distances.   Psychiatric:         Mood and Affect: Mood normal.         Behavior: Behavior normal.         Thought Content: Thought content normal.         Judgment: Judgment normal.            Social History    Tobacco Use      Smoking status: Never        Passive exposure: " Never      Smokeless tobacco: Never       Tobacco Use: Low Risk  (8/1/2025)    Patient History     Smoking Tobacco Use: Never     Smokeless Tobacco Use: Never     Passive Exposure: Never           STEADI Fall Risk Assessment was completed, and patient is at MODERATE risk for falls. Assessment completed on:8/1/2025      Diagnostic Studies: (All imaging is independently reviewed unless stated otherwise.)  Today I have for my review of an MRI of the lumbar spine that was completed on 7/4/2025.  There are multilevel degenerative changes throughout most significant at the L2-3 level where there is a right paracentral disc extrusion contributing to right lateral recess stenosis.  There is a mild anterior listhesis of L4 on L5.  Pictured below is the L2-3 disc herniation.        Assessment/Plan    Diagnoses and all orders for this visit:    1. Lumbar radiculopathy (Primary)  -     Case Request Cath Lab: IR myelogram, lumbar         This is a 69-year-old female with medical comorbidities significant for diabetes (A1c 6), diastolic CHF, COPD, obesity, and aortic valve stenosis whom I recently saw earlier last month during a recent hospitalization for low back pain with radiation into the right lower extremity.  At the time I recommended conservative treatments with pain management and physical therapy.  She underwent epidural steroid injection at [facility] about 2 weeks ago with mild relief of her radicular pain.  She is taking Norco and Lyrica as well.  MRI of the lumbar spine revealed an L2-3 disc herniation and multilevel degenerative changes throughout.  Considering the diffuse nature of the degenerative changes noted throughout her lumbar spine, I would like to move forward with a lumbar myelogram for surgical planning.  When she has completed the after mentioned study she can follow-up to determine if she is a surgical candidate.  Patient encouraged to contact us if there are any changes in her condition or  any concerns. Signs and symptoms that would require further urgent/emergent workup, specifically those associated with cauda equina syndrome, or for patient to reach out to our office were reviewed.     Any copied data from previous notes included in the (1) HPI, (2) PE, (3) MDM and/or Assessment and Plan has been reviewed and accurate as of 08/01/25.    Lakshmi Larios PA-C  08/01/25

## 2025-08-04 ENCOUNTER — OFFICE VISIT (OUTPATIENT)
Dept: INTERNAL MEDICINE | Facility: CLINIC | Age: 70
End: 2025-08-04
Payer: MEDICARE

## 2025-08-04 VITALS
SYSTOLIC BLOOD PRESSURE: 138 MMHG | HEART RATE: 91 BPM | HEIGHT: 65 IN | BODY MASS INDEX: 33.82 KG/M2 | RESPIRATION RATE: 16 BRPM | DIASTOLIC BLOOD PRESSURE: 76 MMHG | TEMPERATURE: 98 F | WEIGHT: 203 LBS | OXYGEN SATURATION: 96 %

## 2025-08-04 DIAGNOSIS — I35.0 NONRHEUMATIC AORTIC VALVE STENOSIS: ICD-10-CM
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
## 2025-08-04 DIAGNOSIS — I50.31 ACUTE DIASTOLIC CHF (CONGESTIVE HEART FAILURE): ICD-10-CM

## 2025-08-04 DIAGNOSIS — M19.90 INFLAMMATORY ARTHRITIS: ICD-10-CM

## 2025-08-04 DIAGNOSIS — Z09 HOSPITAL DISCHARGE FOLLOW-UP: Primary | ICD-10-CM

## 2025-08-04 DIAGNOSIS — E87.1 HYPONATREMIA: ICD-10-CM

## 2025-08-04 PROCEDURE — 3075F SYST BP GE 130 - 139MM HG: CPT | Performed by: INTERNAL MEDICINE

## 2025-08-04 PROCEDURE — 1111F DSCHRG MED/CURRENT MED MERGE: CPT | Performed by: INTERNAL MEDICINE

## 2025-08-04 PROCEDURE — 3044F HG A1C LEVEL LT 7.0%: CPT | Performed by: INTERNAL MEDICINE

## 2025-08-04 PROCEDURE — 3078F DIAST BP <80 MM HG: CPT | Performed by: INTERNAL MEDICINE

## 2025-08-04 PROCEDURE — 99214 OFFICE O/P EST MOD 30 MIN: CPT | Performed by: INTERNAL MEDICINE

## 2025-08-04 PROCEDURE — 1125F AMNT PAIN NOTED PAIN PRSNT: CPT | Performed by: INTERNAL MEDICINE

## 2025-08-04 RX ORDER — AZITHROMYCIN 250 MG/1
TABLET, FILM COATED ORAL
Qty: 6 TABLET | Refills: 0 | Status: ON HOLD | OUTPATIENT
Start: 2025-08-04 | End: 2025-08-11

## 2025-08-04 RX ORDER — VALSARTAN 320 MG/1
320 TABLET ORAL DAILY
Qty: 90 TABLET | Refills: 3 | Status: SHIPPED | OUTPATIENT
Start: 2025-08-04

## 2025-08-05 ENCOUNTER — OFFICE VISIT (OUTPATIENT)
Dept: CARDIOLOGY | Facility: CLINIC | Age: 70
End: 2025-08-05
Payer: MEDICARE

## 2025-08-05 VITALS
HEART RATE: 102 BPM | BODY MASS INDEX: 34.05 KG/M2 | WEIGHT: 204.4 LBS | OXYGEN SATURATION: 99 % | DIASTOLIC BLOOD PRESSURE: 98 MMHG | RESPIRATION RATE: 20 BRPM | SYSTOLIC BLOOD PRESSURE: 140 MMHG | HEIGHT: 65 IN

## 2025-08-05 DIAGNOSIS — I10 ESSENTIAL HYPERTENSION: ICD-10-CM

## 2025-08-05 DIAGNOSIS — I35.0 NONRHEUMATIC AORTIC VALVE STENOSIS: ICD-10-CM

## 2025-08-05 DIAGNOSIS — I50.32 CHRONIC DIASTOLIC HEART FAILURE: ICD-10-CM

## 2025-08-05 DIAGNOSIS — E87.1 HYPONATREMIA: ICD-10-CM

## 2025-08-05 DIAGNOSIS — I20.1 PRINZMETAL ANGINA: ICD-10-CM

## 2025-08-05 DIAGNOSIS — Z09 HOSPITAL DISCHARGE FOLLOW-UP: Primary | ICD-10-CM

## 2025-08-06 DIAGNOSIS — Z00.00 ROUTINE GENERAL MEDICAL EXAMINATION AT A HEALTH CARE FACILITY: ICD-10-CM

## 2025-08-06 DIAGNOSIS — E11.9 TYPE 2 DIABETES MELLITUS WITHOUT COMPLICATION, WITH LONG-TERM CURRENT USE OF INSULIN: ICD-10-CM

## 2025-08-06 DIAGNOSIS — Z79.4 TYPE 2 DIABETES MELLITUS WITHOUT COMPLICATION, WITH LONG-TERM CURRENT USE OF INSULIN: ICD-10-CM

## 2025-08-06 DIAGNOSIS — G43.001 MIGRAINE WITHOUT AURA AND WITH STATUS MIGRAINOSUS, NOT INTRACTABLE: Primary | ICD-10-CM

## 2025-08-06 DIAGNOSIS — I50.32 CHRONIC DIASTOLIC HEART FAILURE: ICD-10-CM

## 2025-08-06 DIAGNOSIS — J32.0 MAXILLARY SINUSITIS, UNSPECIFIED CHRONICITY: ICD-10-CM

## 2025-08-06 RX ORDER — FUROSEMIDE 40 MG/1
40 TABLET ORAL DAILY
Qty: 90 TABLET | Refills: 1 | Status: SHIPPED | OUTPATIENT
Start: 2025-08-06

## 2025-08-08 ENCOUNTER — TELEPHONE (OUTPATIENT)
Dept: NEUROSURGERY | Facility: CLINIC | Age: 70
End: 2025-08-08
Payer: MEDICARE

## 2025-08-08 ENCOUNTER — TRANSCRIBE ORDERS (OUTPATIENT)
Dept: ADMINISTRATIVE | Facility: HOSPITAL | Age: 70
End: 2025-08-08
Payer: MEDICARE

## 2025-08-08 DIAGNOSIS — N18.32 STAGE 3B CHRONIC KIDNEY DISEASE: Primary | ICD-10-CM

## 2025-08-08 DIAGNOSIS — I10 ESSENTIAL (PRIMARY) HYPERTENSION: ICD-10-CM

## 2025-08-09 RX ORDER — CALCIUM CARBONATE 160(400)MG
1 TABLET,CHEWABLE ORAL DAILY
Qty: 1 EACH | Refills: 0 | Status: SHIPPED | OUTPATIENT
Start: 2025-08-09

## 2025-08-11 ENCOUNTER — APPOINTMENT (OUTPATIENT)
Dept: CT IMAGING | Facility: HOSPITAL | Age: 70
End: 2025-08-11
Payer: MEDICARE

## 2025-08-11 ENCOUNTER — HOSPITAL ENCOUNTER (OUTPATIENT)
Facility: HOSPITAL | Age: 70
Setting detail: HOSPITAL OUTPATIENT SURGERY
Discharge: HOME | End: 2025-08-11
Attending: RADIOLOGY | Admitting: RADIOLOGY
Payer: MEDICARE

## 2025-08-11 VITALS
DIASTOLIC BLOOD PRESSURE: 86 MMHG | TEMPERATURE: 97.8 F | HEART RATE: 88 BPM | WEIGHT: 208.34 LBS | BODY MASS INDEX: 34.71 KG/M2 | OXYGEN SATURATION: 97 % | SYSTOLIC BLOOD PRESSURE: 143 MMHG | HEIGHT: 65 IN | RESPIRATION RATE: 16 BRPM

## 2025-08-11 DIAGNOSIS — M54.16 LUMBAR RADICULOPATHY: ICD-10-CM

## 2025-08-11 DIAGNOSIS — M48.061 SPINAL STENOSIS OF LUMBAR REGION WITHOUT NEUROGENIC CLAUDICATION: ICD-10-CM

## 2025-08-11 PROCEDURE — 25010000002 LIDOCAINE PF 1% 1 % SOLUTION: Performed by: RADIOLOGY

## 2025-08-11 PROCEDURE — 72132 CT LUMBAR SPINE W/DYE: CPT

## 2025-08-11 PROCEDURE — 25510000001 IOPAMIDOL 41 % SOLUTION: Performed by: RADIOLOGY

## 2025-08-11 PROCEDURE — 62304 MYELOGRAPHY LUMBAR INJECTION: CPT | Performed by: RADIOLOGY

## 2025-08-11 RX ORDER — HYDROCODONE BITARTRATE AND ACETAMINOPHEN 5; 325 MG/1; MG/1
1 TABLET ORAL EVERY 8 HOURS PRN
Qty: 25 TABLET | Refills: 0 | Status: SHIPPED | OUTPATIENT
Start: 2025-08-11

## 2025-08-11 RX ORDER — LIDOCAINE HYDROCHLORIDE 10 MG/ML
INJECTION, SOLUTION EPIDURAL; INFILTRATION; INTRACAUDAL; PERINEURAL
Status: DISCONTINUED | OUTPATIENT
Start: 2025-08-11 | End: 2025-08-11 | Stop reason: HOSPADM

## 2025-08-11 RX ORDER — PREGABALIN 100 MG/1
100 CAPSULE ORAL EVERY 8 HOURS SCHEDULED
Qty: 90 CAPSULE | Refills: 0 | Status: SHIPPED | OUTPATIENT
Start: 2025-08-11 | End: 2025-09-10

## 2025-08-11 RX ORDER — IOPAMIDOL 408 MG/ML
INJECTION, SOLUTION INTRATHECAL
Status: DISCONTINUED | OUTPATIENT
Start: 2025-08-11 | End: 2025-08-11 | Stop reason: HOSPADM

## 2025-08-12 ENCOUNTER — PRIOR AUTHORIZATION (OUTPATIENT)
Dept: INTERNAL MEDICINE | Facility: CLINIC | Age: 70
End: 2025-08-12
Payer: MEDICARE

## 2025-08-12 DIAGNOSIS — G43.001 MIGRAINE WITHOUT AURA AND WITH STATUS MIGRAINOSUS, NOT INTRACTABLE: ICD-10-CM

## 2025-08-12 DIAGNOSIS — J32.0 MAXILLARY SINUSITIS, UNSPECIFIED CHRONICITY: ICD-10-CM

## 2025-08-13 DIAGNOSIS — M54.50 CHRONIC LOW BACK PAIN, UNSPECIFIED BACK PAIN LATERALITY, UNSPECIFIED WHETHER SCIATICA PRESENT: ICD-10-CM

## 2025-08-13 DIAGNOSIS — M48.061 SPINAL STENOSIS OF LUMBAR REGION WITHOUT NEUROGENIC CLAUDICATION: ICD-10-CM

## 2025-08-13 DIAGNOSIS — M54.16 LUMBAR RADICULOPATHY: ICD-10-CM

## 2025-08-13 DIAGNOSIS — G89.29 CHRONIC LOW BACK PAIN, UNSPECIFIED BACK PAIN LATERALITY, UNSPECIFIED WHETHER SCIATICA PRESENT: ICD-10-CM

## 2025-08-13 DIAGNOSIS — M46.1 INFLAMMATION OF SACROILIAC JOINT: Primary | ICD-10-CM

## 2025-08-13 RX ORDER — CALCIUM CARBONATE 160(400)MG
1 TABLET,CHEWABLE ORAL DAILY
Qty: 1 EACH | Refills: 0 | Status: SHIPPED | OUTPATIENT
Start: 2025-08-13

## 2025-08-14 ENCOUNTER — DISEASE STATE MANAGEMENT VISIT (OUTPATIENT)
Dept: PHARMACY | Facility: HOSPITAL | Age: 70
End: 2025-08-14
Payer: MEDICARE

## 2025-08-14 ENCOUNTER — PRIOR AUTHORIZATION (OUTPATIENT)
Dept: NEUROSURGERY | Facility: CLINIC | Age: 70
End: 2025-08-14
Payer: MEDICARE

## 2025-08-14 VITALS
HEART RATE: 81 BPM | WEIGHT: 208 LBS | DIASTOLIC BLOOD PRESSURE: 73 MMHG | BODY MASS INDEX: 34.61 KG/M2 | SYSTOLIC BLOOD PRESSURE: 117 MMHG | OXYGEN SATURATION: 96 %

## 2025-08-14 DIAGNOSIS — J32.0 MAXILLARY SINUSITIS, UNSPECIFIED CHRONICITY: ICD-10-CM

## 2025-08-14 DIAGNOSIS — I50.32 CHRONIC DIASTOLIC HEART FAILURE: Primary | ICD-10-CM

## 2025-08-14 DIAGNOSIS — G43.001 MIGRAINE WITHOUT AURA AND WITH STATUS MIGRAINOSUS, NOT INTRACTABLE: ICD-10-CM

## 2025-08-14 PROCEDURE — G0463 HOSPITAL OUTPT CLINIC VISIT: HCPCS

## 2025-08-14 PROCEDURE — 99213 OFFICE O/P EST LOW 20 MIN: CPT | Performed by: NURSE PRACTITIONER

## 2025-08-15 ENCOUNTER — TELEPHONE (OUTPATIENT)
Dept: INTERNAL MEDICINE | Facility: CLINIC | Age: 70
End: 2025-08-15
Payer: MEDICARE

## 2025-08-18 ENCOUNTER — OFFICE VISIT (OUTPATIENT)
Dept: NEUROSURGERY | Facility: CLINIC | Age: 70
End: 2025-08-18
Payer: MEDICARE

## 2025-08-18 VITALS — TEMPERATURE: 97.8 F | WEIGHT: 208 LBS | HEIGHT: 65 IN | BODY MASS INDEX: 34.66 KG/M2

## 2025-08-18 DIAGNOSIS — M43.16 SPONDYLOLISTHESIS OF LUMBAR REGION: Primary | ICD-10-CM

## 2025-08-18 DIAGNOSIS — M54.17 LUMBOSACRAL RADICULOPATHY: ICD-10-CM

## 2025-08-18 DIAGNOSIS — M51.26 HERNIATION OF INTERVERTEBRAL DISC AT L2-L3 LEVEL: ICD-10-CM

## 2025-08-18 PROCEDURE — 99213 OFFICE O/P EST LOW 20 MIN: CPT | Performed by: NEUROLOGICAL SURGERY

## 2025-08-19 ENCOUNTER — TRANSCRIBE ORDERS (OUTPATIENT)
Dept: ADMINISTRATIVE | Facility: HOSPITAL | Age: 70
End: 2025-08-19
Payer: MEDICARE

## 2025-08-19 DIAGNOSIS — N18.32 STAGE 3B CHRONIC KIDNEY DISEASE: Primary | ICD-10-CM

## 2025-08-19 DIAGNOSIS — I10 BENIGN HYPERTENSION: ICD-10-CM

## 2025-08-19 RX ORDER — ISOSORBIDE MONONITRATE 30 MG/1
30 TABLET, EXTENDED RELEASE ORAL EVERY MORNING
Qty: 90 TABLET | Refills: 0 | Status: SHIPPED | OUTPATIENT
Start: 2025-08-19

## 2025-08-22 ENCOUNTER — HOSPITAL ENCOUNTER (OUTPATIENT)
Facility: HOSPITAL | Age: 70
Discharge: HOME | End: 2025-08-22
Payer: MEDICARE

## 2025-08-22 VITALS — HEART RATE: 74 BPM | DIASTOLIC BLOOD PRESSURE: 70 MMHG | SYSTOLIC BLOOD PRESSURE: 117 MMHG

## 2025-08-22 DIAGNOSIS — I50.32 CHRONIC DIASTOLIC HEART FAILURE: ICD-10-CM

## 2025-08-22 DIAGNOSIS — Z95.828 PORT-A-CATH IN PLACE: ICD-10-CM

## 2025-08-22 DIAGNOSIS — D80.1 COMMON VARIABLE AGAMMAGLOBULINEMIA: Primary | ICD-10-CM

## 2025-08-22 LAB
ALBUMIN SERPL-MCNC: 4.2 G/DL (ref 3.5–5.2)
ALBUMIN/GLOB SERPL: 1.4 G/DL
ALP SERPL-CCNC: 54 U/L (ref 39–117)
ALT SERPL W P-5'-P-CCNC: 19 U/L (ref 1–33)
ANION GAP SERPL CALCULATED.3IONS-SCNC: 10.5 MMOL/L (ref 5–15)
AST SERPL-CCNC: 33 U/L (ref 1–32)
BASOPHILS # BLD AUTO: 0.05 10*3/MM3 (ref 0–0.2)
BASOPHILS NFR BLD AUTO: 1 % (ref 0–1.5)
BILIRUB SERPL-MCNC: 0.4 MG/DL (ref 0–1.2)
BUN SERPL-MCNC: 14 MG/DL (ref 8–23)
BUN/CREAT SERPL: 13 (ref 7–25)
CALCIUM SPEC-SCNC: 8.5 MG/DL (ref 8.6–10.5)
CHLORIDE SERPL-SCNC: 105 MMOL/L (ref 98–107)
CO2 SERPL-SCNC: 23.5 MMOL/L (ref 22–29)
CREAT SERPL-MCNC: 1.08 MG/DL (ref 0.57–1)
DEPRECATED RDW RBC AUTO: 46 FL (ref 37–54)
EGFRCR SERPLBLD CKD-EPI 2021: 55.7 ML/MIN/1.73
EOSINOPHIL # BLD AUTO: 0.06 10*3/MM3 (ref 0–0.4)
EOSINOPHIL NFR BLD AUTO: 1.2 % (ref 0.3–6.2)
ERYTHROCYTE [DISTWIDTH] IN BLOOD BY AUTOMATED COUNT: 13.2 % (ref 12.3–15.4)
GLOBULIN UR ELPH-MCNC: 3 GM/DL
GLUCOSE SERPL-MCNC: 115 MG/DL (ref 65–99)
HCT VFR BLD AUTO: 35.3 % (ref 34–46.6)
HGB BLD-MCNC: 11.8 G/DL (ref 12–15.9)
IGG1 SER-MCNC: 1064 MG/DL (ref 700–1600)
IMM GRANULOCYTES # BLD AUTO: 0.03 10*3/MM3 (ref 0–0.05)
IMM GRANULOCYTES NFR BLD AUTO: 0.6 % (ref 0–0.5)
LYMPHOCYTES # BLD AUTO: 1.3 10*3/MM3 (ref 0.7–3.1)
LYMPHOCYTES NFR BLD AUTO: 26.9 % (ref 19.6–45.3)
MCH RBC QN AUTO: 32 PG (ref 26.6–33)
MCHC RBC AUTO-ENTMCNC: 33.4 G/DL (ref 31.5–35.7)
MCV RBC AUTO: 95.7 FL (ref 79–97)
MONOCYTES # BLD AUTO: 0.42 10*3/MM3 (ref 0.1–0.9)
MONOCYTES NFR BLD AUTO: 8.7 % (ref 5–12)
NEUTROPHILS NFR BLD AUTO: 2.97 10*3/MM3 (ref 1.7–7)
NEUTROPHILS NFR BLD AUTO: 61.6 % (ref 42.7–76)
NRBC BLD AUTO-RTO: 0 /100 WBC (ref 0–0.2)
PLATELET # BLD AUTO: 228 10*3/MM3 (ref 140–450)
PMV BLD AUTO: 9.5 FL (ref 6–12)
POTASSIUM SERPL-SCNC: 4.4 MMOL/L (ref 3.5–5.2)
PROT SERPL-MCNC: 7.2 G/DL (ref 6–8.5)
RBC # BLD AUTO: 3.69 10*6/MM3 (ref 3.77–5.28)
SODIUM SERPL-SCNC: 139 MMOL/L (ref 136–145)
VIT B12 BLD-MCNC: 777 PG/ML (ref 211–946)
WBC NRBC COR # BLD AUTO: 4.83 10*3/MM3 (ref 3.4–10.8)

## 2025-08-22 PROCEDURE — 82784 ASSAY IGA/IGD/IGG/IGM EACH: CPT | Performed by: ALLERGY & IMMUNOLOGY

## 2025-08-22 PROCEDURE — 25010000002 IMMUNE GLOBULIN (HUMAN) 10 GM/100ML SOLUTION: Performed by: ALLERGY & IMMUNOLOGY

## 2025-08-22 PROCEDURE — 25010000002 HEPARIN LOCK FLUSH PER 10 UNITS: Performed by: ALLERGY & IMMUNOLOGY

## 2025-08-22 PROCEDURE — 25010000002 IMMUNE GLOBULIN (HUMAN) 20 GM/200ML SOLUTION: Performed by: ALLERGY & IMMUNOLOGY

## 2025-08-22 PROCEDURE — 96365 THER/PROPH/DIAG IV INF INIT: CPT

## 2025-08-22 PROCEDURE — 96366 THER/PROPH/DIAG IV INF ADDON: CPT

## 2025-08-22 PROCEDURE — 25810000003 SODIUM CHLORIDE 0.9 % SOLUTION: Performed by: ALLERGY & IMMUNOLOGY

## 2025-08-22 RX ORDER — SODIUM CHLORIDE 0.9 % (FLUSH) 0.9 %
10 SYRINGE (ML) INJECTION AS NEEDED
Status: DISCONTINUED | OUTPATIENT
Start: 2025-08-22 | End: 2025-08-23 | Stop reason: HOSPADM

## 2025-08-22 RX ORDER — SODIUM CHLORIDE 0.9 % (FLUSH) 0.9 %
10 SYRINGE (ML) INJECTION AS NEEDED
OUTPATIENT
Start: 2025-08-22

## 2025-08-22 RX ORDER — ACETAMINOPHEN 325 MG/1
325 TABLET ORAL ONCE
Status: DISCONTINUED | OUTPATIENT
Start: 2025-08-22 | End: 2025-08-23 | Stop reason: HOSPADM

## 2025-08-22 RX ORDER — DIPHENHYDRAMINE HCL 25 MG
50 CAPSULE ORAL ONCE AS NEEDED
Start: 2025-09-19

## 2025-08-22 RX ORDER — METHYLPREDNISOLONE SODIUM SUCCINATE 125 MG/2ML
50 INJECTION, POWDER, LYOPHILIZED, FOR SOLUTION INTRAMUSCULAR; INTRAVENOUS ONCE AS NEEDED
Start: 2025-09-19

## 2025-08-22 RX ORDER — DIPHENHYDRAMINE HCL 25 MG
50 CAPSULE ORAL ONCE
Status: DISCONTINUED | OUTPATIENT
Start: 2025-08-22 | End: 2025-08-23 | Stop reason: HOSPADM

## 2025-08-22 RX ORDER — HEPARIN SODIUM (PORCINE) LOCK FLUSH IV SOLN 100 UNIT/ML 100 UNIT/ML
500 SOLUTION INTRAVENOUS AS NEEDED
OUTPATIENT
Start: 2025-08-22

## 2025-08-22 RX ORDER — PREDNISONE 20 MG/1
40 TABLET ORAL ONCE
Status: DISCONTINUED | OUTPATIENT
Start: 2025-08-22 | End: 2025-08-23 | Stop reason: HOSPADM

## 2025-08-22 RX ORDER — DIPHENHYDRAMINE HCL 25 MG
50 CAPSULE ORAL ONCE
Start: 2025-09-19 | End: 2025-09-19

## 2025-08-22 RX ORDER — HEPARIN SODIUM (PORCINE) LOCK FLUSH IV SOLN 100 UNIT/ML 100 UNIT/ML
500 SOLUTION INTRAVENOUS AS NEEDED
Status: DISCONTINUED | OUTPATIENT
Start: 2025-08-22 | End: 2025-08-23 | Stop reason: HOSPADM

## 2025-08-22 RX ORDER — EPINEPHRINE 1 MG/ML
0.3 INJECTION, SOLUTION INTRAMUSCULAR; SUBCUTANEOUS ONCE AS NEEDED
Start: 2025-09-19

## 2025-08-22 RX ORDER — PREDNISONE 20 MG/1
40 TABLET ORAL ONCE
Start: 2025-09-19 | End: 2025-09-19

## 2025-08-22 RX ORDER — ACETAMINOPHEN 325 MG/1
325 TABLET ORAL ONCE
OUTPATIENT
Start: 2025-09-19

## 2025-08-22 RX ORDER — PREDNISONE 20 MG/1
40 TABLET ORAL ONCE AS NEEDED
Start: 2025-09-19

## 2025-08-22 RX ADMIN — HEPARIN 500 UNITS: 100 SYRINGE at 12:52

## 2025-08-22 RX ADMIN — SODIUM CHLORIDE 250 ML: 900 INJECTION, SOLUTION INTRAVENOUS at 09:31

## 2025-08-22 RX ADMIN — IMMUNE GLOBULIN INFUSION (HUMAN) 10 G: 100 INJECTION, SOLUTION INTRAVENOUS; SUBCUTANEOUS at 09:36

## 2025-08-22 RX ADMIN — IMMUNE GLOBULIN INFUSION (HUMAN) 20 G: 100 INJECTION, SOLUTION INTRAVENOUS; SUBCUTANEOUS at 11:51

## 2025-08-22 RX ADMIN — IMMUNE GLOBULIN INFUSION (HUMAN) 20 G: 100 INJECTION, SOLUTION INTRAVENOUS; SUBCUTANEOUS at 10:41

## 2025-08-25 ENCOUNTER — SPECIALTY PHARMACY (OUTPATIENT)
Dept: PHARMACY | Facility: HOSPITAL | Age: 70
End: 2025-08-25
Payer: MEDICARE

## 2025-08-25 DIAGNOSIS — J45.50 SEVERE PERSISTENT ASTHMA WITHOUT COMPLICATION: Primary | ICD-10-CM

## 2025-08-26 DIAGNOSIS — M48.061 SPINAL STENOSIS OF LUMBAR REGION WITHOUT NEUROGENIC CLAUDICATION: ICD-10-CM

## 2025-08-26 RX ORDER — HYDROCODONE BITARTRATE AND ACETAMINOPHEN 5; 325 MG/1; MG/1
1 TABLET ORAL EVERY 8 HOURS PRN
Qty: 25 TABLET | Refills: 0 | Status: SHIPPED | OUTPATIENT
Start: 2025-08-26

## 2025-08-29 ENCOUNTER — OFFICE VISIT (OUTPATIENT)
Dept: PULMONOLOGY | Facility: CLINIC | Age: 70
End: 2025-08-29
Payer: MEDICARE

## 2025-08-29 VITALS
SYSTOLIC BLOOD PRESSURE: 114 MMHG | HEART RATE: 98 BPM | OXYGEN SATURATION: 96 % | WEIGHT: 208 LBS | DIASTOLIC BLOOD PRESSURE: 64 MMHG | BODY MASS INDEX: 34.66 KG/M2 | HEIGHT: 65 IN

## 2025-08-29 DIAGNOSIS — J45.40 MODERATE PERSISTENT ASTHMA WITHOUT COMPLICATION: Primary | ICD-10-CM

## 2025-08-29 DIAGNOSIS — J30.9 ALLERGIC RHINITIS, UNSPECIFIED SEASONALITY, UNSPECIFIED TRIGGER: ICD-10-CM

## 2025-08-29 DIAGNOSIS — G47.33 OSA (OBSTRUCTIVE SLEEP APNEA): ICD-10-CM

## 2025-08-29 DIAGNOSIS — D80.1 HYPOGAMMAGLOBULINEMIA: ICD-10-CM

## 2025-08-29 DIAGNOSIS — I35.0 AORTIC VALVE STENOSIS, ETIOLOGY OF CARDIAC VALVE DISEASE UNSPECIFIED: ICD-10-CM

## (undated) DEVICE — MMIS - GLOVE SURG 6.5 PROTEXIS LF CRM PF BEAD CUFF STRL

## (undated) DEVICE — DECANTER BAG 9": Brand: MEDLINE INDUSTRIES, INC.

## (undated) DEVICE — MMIS - GOWN SURG XL XLONG AAMI L4 IMPRV RAGLAN SLV A LN

## (undated) DEVICE — SUT VIC 3/0 SH 27IN J416H

## (undated) DEVICE — MMIS - SET INTPLS BN CLN TIP SCT TUBE HNDPC

## (undated) DEVICE — TUBING, SUCTION, 1/4" X 10', STRAIGHT: Brand: MEDLINE

## (undated) DEVICE — INTENDED FOR TISSUE SEPARATION, AND OTHER PROCEDURES THAT REQUIRE A SHARP SURGICAL BLADE TO PUNCTURE OR CUT.: Brand: BARD-PARKER ® STAINLESS STEEL BLADES

## (undated) DEVICE — MMIS - DRAPE TABLE BIG CASE STANDARD BACK

## (undated) DEVICE — MMIS - EVACUATOR URO PVC Y CNCT 3 SPRG 400CC WND DRN STRL

## (undated) DEVICE — MMIS - POSITIONER PSTN FOAM CRDL HDRST LF

## (undated) DEVICE — MMIS - SUTURE VCL+ MTPS 3-0 PS2 27IN BRAID COAT ABS

## (undated) DEVICE — PAD ARMBRD SURG CONVOL 7.5X20X2IN

## (undated) DEVICE — NDL SPINE 22G 31/2IN BLK

## (undated) DEVICE — MMIS - SUTURE ETHIBOND 1 CT1 30IN BRAID NABSB GRN

## (undated) DEVICE — MMIS - PEN SURGMRK STD TIP FLXB RULER LBL PREP RST

## (undated) DEVICE — MMIS - GLOVE SURG 7.5 PROTEXIS LF CRM PF BEAD CUFF STRL

## (undated) DEVICE — MMIS - NEEDLE HPO 22GA 1.5IN REG WALL REG BVL LL HUB

## (undated) DEVICE — MMIS - PAD PSTN UNV KN PSTNR UNV KN FOAM COHESIVE WRAP

## (undated) DEVICE — MMIS - DRESSING ALGINATE 30X9CM AQCL AG HDRFB IONIC SLVR

## (undated) DEVICE — MMIS - CATHETER 6FR 145D PGTL CRV 110CM 2 WIRE BRAID

## (undated) DEVICE — MMIS - CONTAINER SPEC 120ML BLUE 53MM 2 THRD CAP INDIV

## (undated) DEVICE — GLV SURG PREMIERPRO MIC LTX PF SZ6 BRN

## (undated) DEVICE — MMIS - TIP SCT ARG POOLE 14-28FR 25CM SET RGD CNTRL VENT

## (undated) DEVICE — MMIS - HEMOSTAT CMPR VASC LONG 27CM

## (undated) DEVICE — GLV SURG SENSICARE W/ALOE PF LF 7 STRL

## (undated) DEVICE — ANTIBACTERIAL UNDYED BRAIDED (POLYGLACTIN 910), SYNTHETIC ABSORBABLE SUTURE: Brand: COATED VICRYL

## (undated) DEVICE — MMIS - SOLUTION 1L AQLT H2O STRL LF IRR

## (undated) DEVICE — MMIS - NEEDLE HPO 18GA 1.5IN BVL STRL LF DISP JELCO

## (undated) DEVICE — MMIS - COVER STAND 29.5IN CNVRT PLASTIC REINFORCE STRL LF

## (undated) DEVICE — MMIS - SCULP CEMENT BIOPREP STRL DISP

## (undated) DEVICE — MMIS - BLANKET WRM UPR BODY 78.7X29.9IN MISTRAL-AIR FORCE

## (undated) DEVICE — MMIS - CATHETER 6FR AL1 CRV 100CM RADOPQ BRAID SLCT SUP

## (undated) DEVICE — NEEDLE,HYPODERM,SAFETY, 22GX1.5: Brand: MEDLINE

## (undated) DEVICE — LEX VAGINAL HYSTERECTOMY: Brand: MEDLINE INDUSTRIES, INC.

## (undated) DEVICE — 1000 S-DRAPE TOWEL DRAPE 10/BX: Brand: STERI-DRAPE™

## (undated) DEVICE — CVR TRANSD CIV FLX TPR 11.9 TO 3.8X61CM

## (undated) DEVICE — PATIENT RETURN ELECTRODE, SINGLE-USE, CONTACT QUALITY MONITORING, ADULT, WITH 9FT CORD, FOR PATIENTS WEIGING OVER 33LBS. (15KG): Brand: MEGADYNE

## (undated) DEVICE — MMIS - SOLUTION 100ML 1 PK VIAFLEX MINI BAG INJ 0.9% NACL

## (undated) DEVICE — MMIS - PACK SURG STRL LF CNVRT ASTOUND TIBURON MAYO POLY

## (undated) DEVICE — MMIS - GUIDEWIRE WHOLEY 260CM MOD J CRV .035IN VASC

## (undated) DEVICE — MMIS - CONSTRUCT KNEE TOTAL ADVANCED PERSONAL W VIT E

## (undated) DEVICE — MMIS - COUNTER 10 NDL MAG DEVON BOXLOCKS STRL LF BLK SHRP

## (undated) DEVICE — MMIS - SUTURE VCL+ 0 CT1 36IN BRAID COAT ABS UNDYED

## (undated) DEVICE — SPNG GZ WOVN 4X4IN 12PLY 10/BX STRL

## (undated) DEVICE — MMIS - STRAP PSTN 60X3IN DEVON BODY KN

## (undated) DEVICE — SPONGE,LAP,4"X18",XR,ST,5/PK,40PK/CS: Brand: MEDLINE INDUSTRIES, INC.

## (undated) DEVICE — MMIS - MARKER MULTIINK LBL SURG STRL

## (undated) DEVICE — MMIS - INTRODUCER SHTH 6FR 20GA 45CM 10CM FLXB MINI WIRE

## (undated) DEVICE — TOWEL,OR,DSP,ST,BLUE,STD,4/PK,20PK/CS: Brand: MEDLINE

## (undated) DEVICE — GLV SURG SIGNATURE TOUCH PF LTX 7 STRL

## (undated) DEVICE — MMIS - GLOVE SURG 7 PROTEXIS LF CRM PF SMTH BEAD CUFF

## (undated) DEVICE — SCRB SURG BACTOSHIELD CHG 4PCT 4OZ

## (undated) DEVICE — MMIS - STOCKINETTE ORTHO 2YDX6IN POLY CPLX 1 PLY BLEND

## (undated) DEVICE — NDL HYPO ECLPS SFTY 22G 1 1/2IN

## (undated) DEVICE — MMIS - SYRINGE 10ML STRL MED LF DISP LL

## (undated) DEVICE — TRAP FLD MINIVAC MEGADYNE 100ML

## (undated) DEVICE — MMIS - CATHETER 5FR STRGT PGTL CRV 110CM LG INNER LUM 6

## (undated) DEVICE — GLV SURG SENSICARE PI MIC PF SZ6.5 LF STRL

## (undated) DEVICE — INTENDED FOR TISSUE SEPARATION, AND OTHER PROCEDURES THAT REQUIRE A SHARP SURGICAL BLADE TO PUNCTURE OR CUT.: Brand: BARD-PARKER ® CARBON RIB-BACK BLADES

## (undated) DEVICE — MMIS - DRAPE SURG 77X56IN 42X25IN REINF FAN FOLD CNVRT

## (undated) DEVICE — GLV SURG SENSICARE PI LF PF 7.5 GRN STRL

## (undated) DEVICE — MMIS - BOWL SOL 16OZ PP STRL LF DISP CURITY KENDALL

## (undated) DEVICE — MMIS - PACK SURG BASIN SET

## (undated) DEVICE — HDRST POSTN SLOTTED A/

## (undated) DEVICE — MMIS - CANNULA VIAL 16GA ANTICORE ANTIPARTICULATE NDLS 3

## (undated) DEVICE — IRRIGATOR BULB ASEPTO 60CC STRL

## (undated) DEVICE — RICH MAJOR PROCEDURE: Brand: MEDLINE INDUSTRIES, INC.

## (undated) DEVICE — MMIS - ELECTRODE PT RTN C30- LB 9FT CORD NONIRRITATE

## (undated) DEVICE — MMIS - STRIP 4X.5IN 3M STRSTRP POLY POR REINFORCE HPOAL

## (undated) DEVICE — MMIS - SPONGE SURG 4X4IN PS 16 PLY RADOPQ BAND LOW LINT

## (undated) DEVICE — GLV SURG SENSICARE GREEN W/ALOE PF LF 6 STRL

## (undated) DEVICE — MMIS - STRAP PSTN 19X3.5IN TECLIN STRUP SLIP RING NS LF

## (undated) DEVICE — RICH MINOR LITHOTOMY: Brand: MEDLINE INDUSTRIES, INC.

## (undated) DEVICE — BLADE,CLIPPER,UNIVERSAL,GRAY: Brand: MEDLINE

## (undated) DEVICE — TBG PENCL TELESCP MEGADYNE SMOKE EVAC 10FT

## (undated) DEVICE — MMIS - DRAPE SURG 120X77IN REINFORCE SPLIT ABS TUBE HLDR

## (undated) DEVICE — KT PT/PROG SMRT INTERSTIM/X NEUROSTM W/COMMUNIC

## (undated) DEVICE — Device

## (undated) DEVICE — SHEET,DRAPE,70X100,STERILE: Brand: MEDLINE

## (undated) DEVICE — MMIS - CATHETER 6FR JL3.5 CRV 100CM RADOPQ BRAID SLCT SUP

## (undated) DEVICE — MMIS - DECANTER FLUID 9IN SET BAG

## (undated) DEVICE — CLAVICLE STRAP: Brand: DEROYAL

## (undated) DEVICE — MMIS - PAD PSTN RSPBRY SWIRL 20X8X2IN DEVON FOAM

## (undated) DEVICE — MMIS - LINER SCT 1000CC CRD MDVC LOCK LID SHTOF VLV CNSTR

## (undated) DEVICE — MMIS - SYSTEM BN CEMENT UNV HI VAC SPAT TUBE MXVC3 STRL

## (undated) DEVICE — TRY L/P SFTY A/20G

## (undated) DEVICE — 450 ML BOTTLE OF 0.05% CHLORHEXIDINE GLUCONATE IN 99.95% STERILE WATER FOR IRRIGATION, USP AND APPLICATOR.: Brand: IRRISEPT ANTIMICROBIAL WOUND LAVAGE

## (undated) DEVICE — SUT VICYL PLS CTD ANTIB BR 1 27IN VIL

## (undated) DEVICE — MMIS - SUTURE VCL+ 3-0 SH 27IN BRAID COAT ABS UNDYED

## (undated) DEVICE — MMIS - SUTURE MONOCRYL MTPS 4-0 PS2 27IN MONO ABS UNDYED

## (undated) DEVICE — MMIS - STAPLER SKIN WIDE 35 CNT STRL LF VISISTAT DISP SS

## (undated) DEVICE — SYR LL TP 10ML STRL

## (undated) DEVICE — MMIS - SNARE MIC OVAL 240CM 2.4MM 13MM CPTFLX FLXB ENDO

## (undated) DEVICE — MMIS - CIRCUIT BRTHG EXPAND 87IN UNV FLEX2

## (undated) DEVICE — DRAPE,TOP,102X53,STERILE: Brand: MEDLINE

## (undated) DEVICE — MMIS - SHEET PATRAN TRNSF

## (undated) DEVICE — GLV SURG ULTRATOUCH BIOGEL/COAT PF LF SZ6 STRL

## (undated) DEVICE — MMIS - APPLICATOR 70% ISO ALC 2% CHG 26ML 13.2X13.2IN

## (undated) DEVICE — MMIS - BURR SURG 4MM 54MM 8 FLUTE RND FAST CUT

## (undated) DEVICE — MMIS - FORCEPS BIOPSY HOT 240CM 2.2MM RJ 4 +2.8MM DISP

## (undated) DEVICE — MMIS - COVER FLXB STRL CNVRT LIGHT HNDL PLASTIC GRN

## (undated) DEVICE — MMIS - PACK SURG JNT LTX SAFE

## (undated) DEVICE — MMIS - GLOVE SURG 8 PROTEXIS ESTM LF CRM PF SMTH BEAD

## (undated) DEVICE — MMIS - DRAPE EQUIPMENT 72X42IN C ARM POLY STRAP MBL XRAY

## (undated) DEVICE — MMIS - MARKER 2 TIP RULER CAP LBL NTOX SURG DEVON SKIN

## (undated) DEVICE — MMIS - BLADE SAW 104X18.5MM THK1.24MM XLONG SAGITTAL SS

## (undated) DEVICE — SKIN AFFIX SURG ADHESIVE 72/CS 0.55ML: Brand: MEDLINE

## (undated) DEVICE — MMIS - COVER SRG CNTRL STRL LF LIGHT HNDL HARMONYAIR M

## (undated) DEVICE — MMIS - NEEDLE HPO 25GA 1.5IN REG WALL REG BVL LL HUB

## (undated) DEVICE — MMIS - CATHETER 6FR JR4 CLASSIC CRV 100CM RADOPQ BRAID

## (undated) DEVICE — MMIS - GLOVE SURG 7.5 PROTEXIS LF CRM PF SMTH BEAD CUFF

## (undated) DEVICE — MMIS - TRAY SKNPRP BLUE 14.5X11IN EXDN 4% CHG CTN 6 WING

## (undated) DEVICE — MMIS - GOWN SURG XL AAMI L3 REINFORCE STRL BACK SET IN

## (undated) DEVICE — MMIS - SYRINGE 20ML CONC TIP 1ML GRAD N-PYRG DEHP-FR STRL

## (undated) DEVICE — MMIS - GLOVE SURG 6.5 PROTEXIS LF BLUE PF SMTH BEAD CUFF

## (undated) DEVICE — MMIS - DRAIN INCS 15FR 3/16IN FULL FLUTE RND 3/16IN BARD

## (undated) DEVICE — 3M™ IOBAN™ 2 ANTIMICROBIAL INCISE DRAPE 6650EZ: Brand: IOBAN™ 2

## (undated) DEVICE — MMIS - DRAPE SURG 122X100X77IN 6X6IN REINFORCE FENESTRATE

## (undated) DEVICE — MMIS - SYSTEM WND IRR DBRD CLNSG IRRISEPT 0.05% CHG STRL

## (undated) DEVICE — MMIS - HOOD SRG STRSHLD T5 XL PEELAWAY FACE SHLD STRL LF

## (undated) DEVICE — MMIS - GUIDEWIRE STRT 4CM 260CM 1.5MM RDS J ROSEN TPR

## (undated) DEVICE — MMIS - DRAPE SURG XTRMT 128.5X106X87IN CRC 42X30.5IN